# Patient Record
Sex: FEMALE | Race: WHITE | NOT HISPANIC OR LATINO | Employment: OTHER | URBAN - METROPOLITAN AREA
[De-identification: names, ages, dates, MRNs, and addresses within clinical notes are randomized per-mention and may not be internally consistent; named-entity substitution may affect disease eponyms.]

---

## 2017-01-30 DIAGNOSIS — E03.9 ACQUIRED HYPOTHYROIDISM: ICD-10-CM

## 2017-01-30 DIAGNOSIS — E03.9 ACQUIRED HYPOTHYROIDISM: Primary | ICD-10-CM

## 2017-01-30 LAB
T4 FREE SERPL-MCNC: 0.78 NG/DL (ref 0.76–1.46)
TSH SERPL DL<=0.005 MIU/L-ACNC: 5.58 MU/L (ref 0.4–4)

## 2017-01-30 PROCEDURE — 84443 ASSAY THYROID STIM HORMONE: CPT | Performed by: FAMILY MEDICINE

## 2017-01-30 PROCEDURE — 84439 ASSAY OF FREE THYROXINE: CPT | Performed by: FAMILY MEDICINE

## 2017-01-30 PROCEDURE — 36415 COLL VENOUS BLD VENIPUNCTURE: CPT | Performed by: FAMILY MEDICINE

## 2017-01-30 NOTE — TELEPHONE ENCOUNTER
Routing refill request to provider for review/approval because:  Labs out of range:  TSH.  Pt had lab drawn today but is still in process  Neel Zarate RN, BSN

## 2017-01-30 NOTE — TELEPHONE ENCOUNTER
Pending Prescriptions:                       Disp   Refills    levothyroxine (SYNTHROID/LEVOTHROID) 50 M*30 tab*1            Sig: Take 1 tablet (50 mcg) by mouth daily         Last Written Prescription Date: 11/29/2016  Last Quantity: 30, # refills: 1  Last Office Visit with [facility], [facility] or [facility] prescribing provider: 11/03/2016   Next 5 appointments (look out 90 days)     Jan 31, 2017 10:15 AM   Return Visit with Domonique Glynn NP   [facility] ([facility])    [address]  [address]  [facility] [address] [phone]            Feb 01, 2017 11:00 AM   Mishel Duggan with Maryana Brooks MD   [facility]    [address] [facility] [address] [phone]            Apr 11, 2017  3:00 PM   MyChart KENIA Short with Kavitha Spencer DO   [facility]    [address] [facility] [address] [phone]                   TSH   Date Value Ref Range Status   11/28/2016 4.06* 0.40 - 4.00 mU/L Final

## 2017-01-31 RX ORDER — LEVOTHYROXINE SODIUM 50 UG/1
50 TABLET ORAL DAILY
Qty: 30 TABLET | Refills: 1 | Status: SHIPPED | OUTPATIENT
Start: 2017-01-31 | End: 2017-03-08

## 2017-01-31 NOTE — TELEPHONE ENCOUNTER
Looks like her TSH is just a bit above range still, but her T4 has improved. I suggest staying at the same dose and rechecking thyroid levels in 1 month. If she is still high, we may need to make a dose adjustment. JH

## 2017-02-01 ENCOUNTER — OFFICE VISIT (OUTPATIENT)
Dept: FAMILY MEDICINE | Facility: CLINIC | Age: 52
End: 2017-02-01
Payer: COMMERCIAL

## 2017-02-01 VITALS
WEIGHT: 144 LBS | HEIGHT: 64 IN | BODY MASS INDEX: 24.59 KG/M2 | SYSTOLIC BLOOD PRESSURE: 110 MMHG | HEART RATE: 73 BPM | TEMPERATURE: 99.3 F | DIASTOLIC BLOOD PRESSURE: 78 MMHG

## 2017-02-01 DIAGNOSIS — E03.9 ACQUIRED HYPOTHYROIDISM: ICD-10-CM

## 2017-02-01 DIAGNOSIS — K21.9 GASTROESOPHAGEAL REFLUX DISEASE WITHOUT ESOPHAGITIS: Primary | ICD-10-CM

## 2017-02-01 DIAGNOSIS — Z98.84 GASTRIC BYPASS STATUS FOR OBESITY: ICD-10-CM

## 2017-02-01 DIAGNOSIS — R11.0 NAUSEA: ICD-10-CM

## 2017-02-01 DIAGNOSIS — G89.4 CHRONIC PAIN SYNDROME: ICD-10-CM

## 2017-02-01 DIAGNOSIS — F34.1 DYSTHYMIA: Primary | ICD-10-CM

## 2017-02-01 DIAGNOSIS — Z11.59 NEED FOR HEPATITIS C SCREENING TEST: ICD-10-CM

## 2017-02-01 PROCEDURE — 99213 OFFICE O/P EST LOW 20 MIN: CPT | Performed by: FAMILY MEDICINE

## 2017-02-01 RX ORDER — BUPRENORPHINE HYDROCHLORIDE, NALOXONE HYDROCHLORIDE 2; .5 MG/1; MG/1
FILM, SOLUBLE BUCCAL; SUBLINGUAL
COMMUNITY
Start: 2017-01-23 | End: 2017-02-01

## 2017-02-01 RX ORDER — CEFDINIR 300 MG/1
CAPSULE ORAL 2 TIMES DAILY PRN
COMMUNITY
Start: 2016-03-16 | End: 2017-02-01

## 2017-02-01 RX ORDER — LEVOTHYROXINE SODIUM 50 UG/1
50 TABLET ORAL DAILY
Qty: 30 TABLET | Refills: 1 | Status: CANCELLED | OUTPATIENT
Start: 2017-02-01

## 2017-02-01 ASSESSMENT — ANXIETY QUESTIONNAIRES
2. NOT BEING ABLE TO STOP OR CONTROL WORRYING: SEVERAL DAYS
IF YOU CHECKED OFF ANY PROBLEMS ON THIS QUESTIONNAIRE, HOW DIFFICULT HAVE THESE PROBLEMS MADE IT FOR YOU TO DO YOUR WORK, TAKE CARE OF THINGS AT HOME, OR GET ALONG WITH OTHER PEOPLE: SOMEWHAT DIFFICULT
3. WORRYING TOO MUCH ABOUT DIFFERENT THINGS: NOT AT ALL
7. FEELING AFRAID AS IF SOMETHING AWFUL MIGHT HAPPEN: NOT AT ALL
GAD7 TOTAL SCORE: 5
6. BECOMING EASILY ANNOYED OR IRRITABLE: SEVERAL DAYS
5. BEING SO RESTLESS THAT IT IS HARD TO SIT STILL: SEVERAL DAYS
1. FEELING NERVOUS, ANXIOUS, OR ON EDGE: SEVERAL DAYS

## 2017-02-01 ASSESSMENT — PATIENT HEALTH QUESTIONNAIRE - PHQ9: 5. POOR APPETITE OR OVEREATING: SEVERAL DAYS

## 2017-02-01 NOTE — PATIENT INSTRUCTIONS
Role of the Thyroid  The thyroid is an endocrine gland located in the neck, just below the voicebox. Endocrine glands make hormones. These are chemicals that carry messages through the bloodstream to other parts of the body. The thyroid gland makes thyroid hormones. The thyroid gland is regulated by the pituitary, a gland at the base of the brain.  Keeping the body working right    Thyroid hormones help keep all the cells in the body working right. It does this by controlling the metabolism. This is the rate at which every part of the body functions. The right amount of thyroid hormones keep the metabolism at a healthy pace. This helps the brain, heart, muscles, and other organs work well. A balanced metabolism also helps ensure a healthy temperature, heart rate, energy level, and growth rate.  The thyroid cycle  The thyroid hormone must be kept at a healthy level. A complex cycle maintains this level. The cycle starts with the pituitary. This gland monitors the level of thyroid hormone in the blood. Depending on the level, the pituitary sends TSH (thyroid stimulating hormone) through the bloodstream to the thyroid gland. TSH tells the thyroid how much thyroid hormone to make. In response to TSH, the thyroid makes thyroid hormone. Then thyroid hormone is sent into the bloodstream to the rest of the body. The pituitary senses the hormone level, adjusts the TSH level, and the cycle continues.    3212-6531 [employer]. [address] [place] [address], [place] [address]. All rights reserved. This information is not intended as a substitute for professional medical care. Always follow your healthcare professional's instructions.

## 2017-02-01 NOTE — NURSING NOTE
"Chief Complaint   Patient presents with     Recheck Medication       Initial /78 mmHg  Pulse 73  Temp(Src) 99.3  F (37.4  C) (Oral)  Ht 5' 4\" (1.626 m)  Wt 144 lb (65.318 kg)  BMI 24.71 kg/m2  LMP 12/19/2013  Breastfeeding? No Estimated body mass index is 24.71 kg/(m^2) as calculated from the following:    Height as of this encounter: 5' 4\" (1.626 m).    Weight as of this encounter: 144 lb (65.318 kg).  BP completed using cuff size: nikki James CMA          "

## 2017-02-01 NOTE — Clinical Note
My Depression Action Plan  Name: Lynn Thompson   Date of Birth 1965  Date: 2/1/2017    My doctor: Maryana Brooks   My clinic: [facility]  [address] [place] [address] [phone]          GREEN    ZONE   Good Control    What it looks like:     Things are going generally well. You have normal up s and down s. You may even feel depressed from time to time, but bad moods usually last less than a day.   What you need to do:  1. Continue to care for yourself (see self care plan)  2. Check your depression survival kit and update it as needed  3. Follow your physician s recommendations including any medication.  4. Do not stop taking medication unless you consult with your physician first.           YELLOW         ZONE Getting Worse    What it looks like:     Depression is starting to interfere with your life.     It may be hard to get out of bed; you may be starting to isolate yourself from others.    Symptoms of depression are starting to last most all day and this has happened for several days.     You may have suicidal thoughts but they are not constant.   What you need to do:     1. Call your care team, your response to treatment will improve if you keep your care team informed of your progress. Yellow periods are signs an adjustment may need to be made.     2. Continue your self-care, even if you have to fake it!    3. Talk to someone in your support network    4. Open up your depression survival kit           RED    ZONE Medical Alert - Get Help    What it looks like:     Depression is seriously interfering with your life.     You may experience these or other symptoms: You can t get out of bed most days, can t work or engage in other necessary activities, you have trouble taking care of basic hygiene, or basic responsibilities, thoughts of suicide or death that will not go away, self-injurious behavior.     What you need to do:  1. Call your care team  and request a same-day appointment. If they are not available (weekends or after hours) call your local crisis line, emergency room or 911.      Electronically signed by: Moises James, February 1, 2017    Depression Self Care Plan / Survival Kit    Self-Care for Depression  Here s the deal. Your body and mind are really not as separate as most people think.  What you do and think affects how you feel and how you feel influences what you do and think. This means if you do things that people who feel good do, it will help you feel better.  Sometimes this is all it takes.  There is also a place for medication and therapy depending on how severe your depression is, so be sure to consult with your medical provider and/ or Behavioral Health Consultant if your symptoms are worsening or not improving.     In order to better manage my stress, I will:    Exercise  Get some form of exercise, every day. This will help reduce pain and release endorphins, the  feel good  chemicals in your brain. This is almost as good as taking antidepressants!  This is not the same as joining a gym and then never going! (they count on that by the way ) It can be as simple as just going for a walk or doing some gardening, anything that will get you moving.      Hygiene   Maintain good hygiene (Get out of bed in the morning, Make your bed, Brush your teeth, Take a shower, and Get dressed like you were going to work, even if you are unemployed).  If your clothes don't fit try to get ones that do.    Diet  I will strive to eat foods that are good for me, drink plenty of water, and avoid excessive sugar, caffeine, alcohol, and other mood-altering substances.  Some foods that are helpful in depression are: complex carbohydrates, B vitamins, flaxseed, fish or fish oil, fresh fruits and vegetables.    Psychotherapy  I agree to participate in Individual Therapy (if recommended).    Medication  If prescribed medications, I agree to take them.  Missing  doses can result in serious side effects.  I understand that drinking alcohol, or other illicit drug use, may cause potential side effects.  I will not stop my medication abruptly without first discussing it with my provider.    Staying Connected With Others  I will stay in touch with my friends, family members, and my primary care provider/team.    Use your imagination  Be creative.  We all have a creative side; it doesn t matter if it s oil painting, sand castles, or mud pies! This will also kick up the endorphins.    Witness Beauty  (AKA stop and smell the roses) Take a look outside, even in mid-winter. Notice colors, textures. Watch the squirrels and birds.     Service to others  Be of service to others.  There is always someone else in need.  By helping others we can  get out of ourselves  and remember the really important things.  This also provides opportunities for practicing all the other parts of the program.    Humor  Laugh and be silly!  Adjust your TV habits for less news and crime-drama and more comedy.    Control your stress  Try breathing deep, massage therapy, biofeedback, and meditation. Find time to relax each day.     My support system    Clinic Contact:  Phone number:    Contact 1:  Phone number:    Contact 2:  Phone number:    Mandaen/:  Phone number:    Therapist:  Phone number:    Local crisis center:    Phone number:    Other community support:  Phone number:

## 2017-02-01 NOTE — MR AVS SNAPSHOT
After Visit Summary   2/1/2017    Lynn Thompson    MRN: [id number]           Patient Information     Date Of Birth          1965        Visit Information        Provider Department      2/1/2017 11:00 AM Maryana Brooks MD [facility]        Today's Diagnoses     Dysthymia    -  1     Acquired hypothyroidism         Need for hepatitis C screening test           Care Instructions      Role of the Thyroid  The thyroid is an endocrine gland located in the neck, just below the voicebox. Endocrine glands make hormones. These are chemicals that carry messages through the bloodstream to other parts of the body. The thyroid gland makes thyroid hormones. The thyroid gland is regulated by the pituitary, a gland at the base of the brain.  Keeping the body working right    Thyroid hormones help keep all the cells in the body working right. It does this by controlling the metabolism. This is the rate at which every part of the body functions. The right amount of thyroid hormones keep the metabolism at a healthy pace. This helps the brain, heart, muscles, and other organs work well. A balanced metabolism also helps ensure a healthy temperature, heart rate, energy level, and growth rate.  The thyroid cycle  The thyroid hormone must be kept at a healthy level. A complex cycle maintains this level. The cycle starts with the pituitary. This gland monitors the level of thyroid hormone in the blood. Depending on the level, the pituitary sends TSH (thyroid stimulating hormone) through the bloodstream to the thyroid gland. TSH tells the thyroid how much thyroid hormone to make. In response to TSH, the thyroid makes thyroid hormone. Then thyroid hormone is sent into the bloodstream to the rest of the body. The pituitary senses the hormone level, adjusts the TSH level, and the cycle continues.    9393-5130 [employer]. [address] [place] [address], [place] [address]. All rights reserved.  This information is not intended as a substitute for professional medical care. Always follow your healthcare professional's instructions.              Follow-ups after your visit        Your next 10 appointments already scheduled     Feb 06, 2017 12:45 PM   Return Visit with Domonique Glynn NP   [facility]    [address]  [address]  [facility] [address]   [phone]            Apr 11, 2017  3:00 PM   MyChart OB Short with Kavitha Spencer, DO   [facility] ([facility])    [address] [facility] [address] [phone]              Future tests that were ordered for you today     Open Future Orders        Priority Expected Expires Ordered    Hepatitis C Screen Reflex to HCV RNA Quant and Genotype Routine  2/1/2018 2/1/2017            Who to contact     If you have questions or need follow up information about today's clinic visit or your schedule please contact [facility] directly at [phone].  Normal or non-critical lab and imaging results will be communicated to you by MyChart, letter or phone within 4 business days after the clinic has received the results. If you do not hear from us within 7 days, please contact the clinic through SendTaskhart or phone. If you have a critical or abnormal lab result, we will notify you by phone as soon as possible.  Submit refill requests through [employer] or call your pharmacy and they will forward the refill request to us. Please allow 3 business days for your refill to be completed.          Additional Information About Your Visit        SendTaskhart Information     [employer] gives you secure access to your electronic health record. If you see a primary care provider, you can also send messages to your care team and make appointments. If you have questions, please call your primary care clinic.  If you do not have a primary care provider, please call  "[phone] and they will assist you.        Care EveryWhere ID     This is your Care EveryWhere ID. This could be used by other organizations to access your [facility] medical records  [id number]        Your Vitals Were     Pulse Temperature Height BMI (Body Mass Index) Last Period Breastfeeding?    73 99.3  F (37.4  C) (Oral) 5' 4\" (1.626 m) 24.71 kg/m2 12/19/2013 No       Blood Pressure from Last 3 Encounters:   02/01/17 110/78   12/19/16 110/60   12/14/16 102/68    Weight from Last 3 Encounters:   02/01/17 144 lb (65.318 kg)   12/19/16 146 lb (66.225 kg)   12/14/16 149 lb 3.2 oz (67.677 kg)              We Performed the Following     DEPRESSION ACTION PLAN (DAP) Order [[id number]]     DEPRESSION ACTION PLAN (DAP)          Today's Medication Changes          These changes are accurate as of: 2/1/17 11:49 AM.  If you have any questions, ask your nurse or doctor.               Stop taking these medicines if you haven't already. Please contact your care team if you have questions.     cefdinir 300 MG capsule   Commonly known as:  OMNICEF   Stopped by:  Maryana Brooks MD           fentaNYL 25 mcg/hr 72 hr patch   Commonly known as:  DURAGESIC   Stopped by:  Maryana Brooks MD           HYDROmorphone 2 MG tablet   Commonly known as:  DILAUDID   Stopped by:  Maryana Brooks MD           LORazepam 0.5 MG tablet   Commonly known as:  ATIVAN   Stopped by:  Maryana Brooks MD           SUBOXONE 2-0.5 MG per film   Generic drug:  buprenorphine HCl-naloxone HCl   Stopped by:  Maryana Brooks MD                    Primary Care Provider Office Phone # Fax #    Maryana Brooks [phone]       [facility] [address]  [facility] [address]        Thank you!     Thank you for choosing [facility]  for your care. Our goal is always to provide you with excellent care. Hearing back from our patients is one way we can continue to improve our " services. Please take a few minutes to complete the written survey that you may receive in the mail after your visit with us. Thank you!             Your Updated Medication List - Protect others around you: Learn how to safely use, store and throw away your medicines at www.disposemymeds.org.          This list is accurate as of: 2/1/17 11:49 AM.  Always use your most recent med list.                   Brand Name Dispense Instructions for use    amylase-lipase-protease 48087 UNITS Tabs tablet    VIOKACE    600 tablet    4-5 aps with meals and 1-2 with snacks       blood glucose lancing device     1 each    Use to test blood sugars 6 times daily or as directed.       blood glucose monitoring test strip    Untangle CONTOUR NEXT    2 Box    Check BS 4-6 times a day       BOOST HIGH PROTEIN Liqd      After above baseline labs are drawn, give: 6 mL/kg to maximum of 360 mL; the beverage is to be consumed within 5 minutes.       busPIRone 15 MG tablet    BUSPAR    120 tablet    Increased dose. 30 mg two times per day.       docusate 50 MG/5ML liquid    COLACE     Take 100 mg by mouth 2 times daily as needed for constipation       DULoxetine 30 MG EC capsule    CYMBALTA    60 capsule    Take 1 capsule (30 mg) by mouth 2 times daily Increased dose.       estradiol 0.1 MG/GM cream    ESTRACE VAGINAL    42.5 g    Place 2 g vaginally twice a week       estrogen (conjugated)-medroxyPROGESTERone 0.3-1.5 MG per tablet    PREMPRO    90 tablet    Take 1 tablet by mouth daily       gabapentin 100 MG capsule    NEURONTIN    180 capsule    Take 2 capsules (200 mg) by mouth 3 times daily       glucose 40 % Gel gel     1 Tube    Take 15-30 g by mouth every 15 minutes as needed.       insulin pen needle 32G X 4 MM    BD MAHESH U/F    100 each    Use up to 3 times daily as needed for insulin dosing       levothyroxine 50 MCG tablet    SYNTHROID/LEVOTHROID    30 tablet    Take 1 tablet (50 mcg) by mouth daily       loratadine 10 MG tablet     CLARITIN     Take 10 mg by mouth daily       modafinil 200 MG tablet    PROVIGIL     Take 2 tablets by mouth Once a Day       multivitamin CF formula Liqd liquid     75 mL    Take 2.5 mLs by mouth daily       NovoLOG penFILL 100 UNITS/ML injection   Generic drug:  insulin aspart     3 Month    Take as directed       ELKE CARBAJAL (HUNTER) Genny   Generic drug:  Injection Device for insulin      1 Device Inject 1 unit as needed for blood glucose above 140 mg/dL.       omeprazole 40 MG capsule    priLOSEC    180 capsule    Take 1 capsule (40 mg) by mouth 2 times daily Take 30-60 minutes before a meal.       polyethylene glycol Packet    MIRALAX/GLYCOLAX    7 packet    Take 17 g by mouth daily       sennosides 8.6 MG tablet    SENOKOT    120 tablet    Take 2 tablets by mouth 2 times daily       Sharps Container Misc     1 each    For insulin needles       traZODone 50 MG tablet    DESYREL    30 tablet    Take 1 tablet (50 mg) by mouth At Bedtime

## 2017-02-01 NOTE — TELEPHONE ENCOUNTER
omeprazole      Last Written Prescription Date: 3/1/16  Last Fill Quantity: 180,  # refills: 3   Last Office Visit with [facility], [facility] or [facility] prescribing provider: 6/7/16                                         Next 5 appointments (look out 90 days)     Feb 01, 2017 11:00 AM   SHORT [facility]ana Brooks MD   [facility]    [address] [facility] [address]   [phone]            Feb 06, 2017 12:45 PM   Return Visit with Domonique Glynn NP   [facility]    [address]  [address]  [facility] [address]   [phone]            Apr 11, 2017  3:00 PM   MyChart KENIA Barker with Kavitha Spencer DO   [facility]    [address] [facility] [address]   [phone]

## 2017-02-01 NOTE — PROGRESS NOTES
"  SUBJECTIVE:                                                    Lynn Thompson is a 52 year old female who presents to clinic today for the following health issues:    Medication Followup     Taking Medication as prescribed: yes    Side Effects:  None    Medication Helping Symptoms:  yes       Taking 50 mcg synthroid daily with no issues. Had thyroid levels tested recently, would like to review these.     Taking suboxone for chronic pain, physical addition to opiates, notes her anxiety is much improved since starting this. Not sure her buspar is helpful. Has noted an improvement since starting Cymbalta. Not using ativan at all since suboxone was started. Feels much better.       Problem list and histories reviewed & adjusted, as indicated.  Additional history: none    Problem list, Medication list, Allergies, and Medical/Social/Surgical histories reviewed in EPIC and updated as appropriate.    ROS:  Constitutional, HEENT, cardiovascular, pulmonary, gi and gu systems are negative, except as otherwise noted.    OBJECTIVE:                                                    /78 mmHg  Pulse 73  Temp(Src) 99.3  F (37.4  C) (Oral)  Ht 5' 4\" (1.626 m)  Wt 144 lb (65.318 kg)  BMI 24.71 kg/m2  LMP 12/19/2013  Breastfeeding? No  Body mass index is 24.71 kg/(m^2).  GENERAL: healthy, alert and no distress  NECK: no adenopathy, no asymmetry, masses, or scars and thyroid normal to palpation  RESP: lungs clear to auscultation - no rales, rhonchi or wheezes  CV: regular rate and rhythm, normal S1 S2, no S3 or S4, no murmur, click or rub, no peripheral edema and peripheral pulses strong    Diagnostic Test Results:  none     Results for orders placed or performed in visit on 01/30/17   TSH with free T4 reflex   Result Value Ref Range    TSH 5.58 (H) 0.40 - 4.00 mU/L   T4 free   Result Value Ref Range    T4 Free 0.78 0.76 - 1.46 ng/dL     *Note: Due to a large number of results and/or encounters for the requested time " period, some results have not been displayed. A complete set of results can be found in Results Review.          ASSESSMENT/PLAN:                                                      1. Dysthymia - improved  - DEPRESSION ACTION PLAN (DAP)    2. Acquired hypothyroidism - Discussed that T4 was now in range but TSH continues to be a bit high. Suggested continuing at same dose synthroid for next month and rechecking at that time.    3. Need for hepatitis C screening test  - Hepatitis C Screen Reflex to HCV RNA Quant and Genotype; Future    4. Chronic pain syndrome - on suboxone through pain clinic    Maryana Brooks MD  [facility]

## 2017-02-02 RX ORDER — OMEPRAZOLE 40 MG/1
40 CAPSULE, DELAYED RELEASE ORAL 2 TIMES DAILY
Qty: 180 CAPSULE | Refills: 3 | Status: SHIPPED | OUTPATIENT
Start: 2017-02-02 | End: 2018-02-10

## 2017-02-02 ASSESSMENT — PATIENT HEALTH QUESTIONNAIRE - PHQ9: SUM OF ALL RESPONSES TO PHQ QUESTIONS 1-9: 3

## 2017-02-02 ASSESSMENT — ANXIETY QUESTIONNAIRES: GAD7 TOTAL SCORE: 5

## 2017-02-02 NOTE — TELEPHONE ENCOUNTER
Pt is seen by Dr. Brooks.  Prescription approved per [facility] Refill Protocol.  Brittnee Robertson RN

## 2017-02-03 ENCOUNTER — TELEPHONE (OUTPATIENT)
Dept: FAMILY MEDICINE | Facility: CLINIC | Age: 52
End: 2017-02-03

## 2017-02-03 NOTE — TELEPHONE ENCOUNTER
PA submitted through cover [employer] [facility] for Omeprazole 40MG    [facility] at [phone].  Patient ID# [id number]    Edy DHILLON

## 2017-02-06 ENCOUNTER — OFFICE VISIT (OUTPATIENT)
Dept: PSYCHIATRY | Facility: CLINIC | Age: 52
End: 2017-02-06
Payer: COMMERCIAL

## 2017-02-06 VITALS
BODY MASS INDEX: 24.75 KG/M2 | DIASTOLIC BLOOD PRESSURE: 66 MMHG | TEMPERATURE: 98.5 F | RESPIRATION RATE: 14 BRPM | HEART RATE: 77 BPM | OXYGEN SATURATION: 97 % | HEIGHT: 64 IN | WEIGHT: 145 LBS | SYSTOLIC BLOOD PRESSURE: 118 MMHG

## 2017-02-06 DIAGNOSIS — G47.00 PERSISTENT INSOMNIA: ICD-10-CM

## 2017-02-06 DIAGNOSIS — F41.9 ANXIETY: Primary | ICD-10-CM

## 2017-02-06 PROCEDURE — 99214 OFFICE O/P EST MOD 30 MIN: CPT | Performed by: NURSE PRACTITIONER

## 2017-02-06 RX ORDER — DULOXETIN HYDROCHLORIDE 30 MG/1
30 CAPSULE, DELAYED RELEASE ORAL 2 TIMES DAILY
Qty: 180 CAPSULE | Refills: 1 | Status: SHIPPED | OUTPATIENT
Start: 2017-02-06 | End: 2017-05-25

## 2017-02-06 RX ORDER — TRAZODONE HYDROCHLORIDE 50 MG/1
50 TABLET, FILM COATED ORAL AT BEDTIME
Qty: 90 TABLET | Refills: 1 | Status: SHIPPED | OUTPATIENT
Start: 2017-02-06 | End: 2017-05-25

## 2017-02-06 RX ORDER — BUSPIRONE HYDROCHLORIDE 15 MG/1
TABLET ORAL
Qty: 120 TABLET | Refills: 3 | Status: SHIPPED | OUTPATIENT
Start: 2017-02-06 | End: 2017-07-06

## 2017-02-06 ASSESSMENT — ANXIETY QUESTIONNAIRES
5. BEING SO RESTLESS THAT IT IS HARD TO SIT STILL: NOT AT ALL
2. NOT BEING ABLE TO STOP OR CONTROL WORRYING: SEVERAL DAYS
1. FEELING NERVOUS, ANXIOUS, OR ON EDGE: SEVERAL DAYS
3. WORRYING TOO MUCH ABOUT DIFFERENT THINGS: SEVERAL DAYS
7. FEELING AFRAID AS IF SOMETHING AWFUL MIGHT HAPPEN: SEVERAL DAYS
GAD7 TOTAL SCORE: 6
6. BECOMING EASILY ANNOYED OR IRRITABLE: SEVERAL DAYS

## 2017-02-06 ASSESSMENT — PATIENT HEALTH QUESTIONNAIRE - PHQ9: 5. POOR APPETITE OR OVEREATING: SEVERAL DAYS

## 2017-02-06 NOTE — MR AVS SNAPSHOT
After Visit Summary   2/6/2017    Lynn Thompson    MRN: [id number]           Patient Information     Date Of Birth          1965        Visit Information        Provider Department      2/6/2017 12:45 PM Domonique Glynn NP [facility]        Today's Diagnoses     Anxiety    -  1     Persistent insomnia           Care Instructions    Treatment Plan:    Continue Buspar (buspirone) 30 mg by mouth in AM and 30 mg in PM.    Continue Cymbalta (duloxetine) to 30 mg in AM and 30 mg in PM.     Continue trazodone 50 mg by mouth at bedtime for sleep.    Continue Neurontin (gabapentin) 200 mg 3 times per day per pain specialist.    Continue Provigil (modafinil) 400 mg in AM per sleep specialist.    Continue Ativan (lorazepam) as needed for panic per primary care provider.     Continue all other medications as reviewed per electronic medical record today.     All questions addressed. Education and counseling completed regarding risks and benefits of medications and psychotherapy options.    Safety plan was reviewed. To the Emergency Department as needed or call after hours crisis line at [phone] or [phone].     Continue individual/group therapy as planned.    Schedule an appointment with me in 3 months or sooner as needed. Call [facility] at [phone] to schedule.    Follow up with primary care provider as planned or for acute medical concerns.    Call the psychiatric nurse line with medication questions or concerns at [phone].    My Practice Policy was reviewed and signed: YES     MyChart may be used to communicate with your provider, but this is not intended to be used for emergencies.        Follow-ups after your visit        Your next 10 appointments already scheduled     Apr 11, 2017  3:00 PM   Mishel OB Short with aKvitha Spencer,    [facility] ([facility])    [address] [facility] [address]  "  [phone]              Who to contact     If you have questions or need follow up information about today's clinic visit or your schedule please contact [facility] directly at [phone].  Normal or non-critical lab and imaging results will be communicated to you by MyChart, letter or phone within 4 business days after the clinic has received the results. If you do not hear from us within 7 days, please contact the clinic through MyChart or phone. If you have a critical or abnormal lab result, we will notify you by phone as soon as possible.  Submit refill requests through Hubskip or call your pharmacy and they will forward the refill request to us. Please allow 3 business days for your refill to be completed.          Additional Information About Your Visit        [employer] Information     Hubskip gives you secure access to your electronic health record. If you see a primary care provider, you can also send messages to your care team and make appointments. If you have questions, please call your primary care clinic.  If you do not have a primary care provider, please call [phone] and they will assist you.        Care EveryWhere ID     This is your Care EveryWhere ID. This could be used by other organizations to access your [facility] medical records  [id number]        Your Vitals Were     Pulse Temperature Respirations    77 98.5  F (36.9  C) (Oral) 14    Height BMI (Body Mass Index) Pulse Oximetry    5' 4\" (1.626 m) 24.88 kg/m2 97%    Last Period Breastfeeding?       12/19/2013 No        Blood Pressure from Last 3 Encounters:   02/06/17 118/66   02/01/17 110/78   12/19/16 110/60    Weight from Last 3 Encounters:   02/06/17 145 lb (65.772 kg)   02/01/17 144 lb (65.318 kg)   12/19/16 146 lb (66.225 kg)              Today, you had the following     No orders found for display         Where to get your medicines      These medications were sent to [id number] [place], [place] - [address] [address]  [address], [facility] [address]     Phone:  [phone]    - busPIRone 15 MG tablet  - DULoxetine 30 MG EC capsule  - traZODone 50 MG tablet       Primary Care Provider Office Phone # Fax #    Maryana Sivakumar Brooks [phone]       [facility] [address]  [facility] [address]        Thank you!     Thank you for choosing [facility]  for your care. Our goal is always to provide you with excellent care. Hearing back from our patients is one way we can continue to improve our services. Please take a few minutes to complete the written survey that you may receive in the mail after your visit with us. Thank you!             Your Updated Medication List - Protect others around you: Learn how to safely use, store and throw away your medicines at www.disposemymeds.org.          This list is accurate as of: 2/6/17  1:16 PM.  Always use your most recent med list.                   Brand Name Dispense Instructions for use    amylase-lipase-protease 43295 UNITS Tabs tablet    VIOKACE    600 tablet    4-5 aps with meals and 1-2 with snacks       blood glucose lancing device     1 each    Use to test blood sugars 6 times daily or as directed.       blood glucose monitoring test strip    BILL CONTOUR NEXT    2 Box    Check BS 4-6 times a day       BOOST HIGH PROTEIN Liqd      After above baseline labs are drawn, give: 6 mL/kg to maximum of 360 mL; the beverage is to be consumed within 5 minutes.       busPIRone 15 MG tablet    BUSPAR    120 tablet    Increased dose. 30 mg two times per day.       docusate 50 MG/5ML liquid    COLACE     Take 100 mg by mouth 2 times daily as needed for constipation       DULoxetine 30 MG EC capsule    CYMBALTA    180 capsule    Take 1 capsule (30 mg) by mouth 2 times daily Increased dose.       estradiol 0.1 MG/GM cream    ESTRACE VAGINAL    42.5 g    Place 2 g vaginally twice a week       estrogen  (conjugated)-medroxyPROGESTERone 0.3-1.5 MG per tablet    PREMPRO    90 tablet    Take 1 tablet by mouth daily       gabapentin 100 MG capsule    NEURONTIN    180 capsule    Take 2 capsules (200 mg) by mouth 3 times daily       glucose 40 % Gel gel     1 Tube    Take 15-30 g by mouth every 15 minutes as needed.       insulin pen needle 32G X 4 MM    BD MAHESH U/F    100 each    Use up to 3 times daily as needed for insulin dosing       levothyroxine 50 MCG tablet    SYNTHROID/LEVOTHROID    30 tablet    Take 1 tablet (50 mcg) by mouth daily       loratadine 10 MG tablet    CLARITIN     Take 10 mg by mouth daily       modafinil 200 MG tablet    PROVIGIL     Take 2 tablets by mouth Once a Day       multivitamin CF formula Liqd liquid     75 mL    Take 2.5 mLs by mouth daily       NovoLOG penFILL 100 UNITS/ML injection   Generic drug:  insulin aspart     3 Month    Take as directed       ELKE FRIEND) Genny   Generic drug:  Injection Device for insulin      1 Device Inject 1 unit as needed for blood glucose above 140 mg/dL.       omeprazole 40 MG capsule    priLOSEC    180 capsule    Take 1 capsule (40 mg) by mouth 2 times daily Take 30-60 minutes before a meal.       polyethylene glycol Packet    MIRALAX/GLYCOLAX    7 packet    Take 17 g by mouth daily       sennosides 8.6 MG tablet    SENOKOT    120 tablet    Take 2 tablets by mouth 2 times daily       Sharps Container Misc     1 each    For insulin needles       traZODone 50 MG tablet    DESYREL    90 tablet    Take 1 tablet (50 mg) by mouth At Bedtime

## 2017-02-06 NOTE — PROGRESS NOTES
"  SUBJECTIVE:                                                    Lynn Thompson is a 52 year old female who presents to clinic today for the following health issues:      ***    {additional problems for provider to add:466173}    Problem list and histories reviewed & adjusted, as indicated.  Additional history: {NONE - AS DOCUMENTED:103688::\"as documented\"}    {HIST REVIEW/ LINKS 2:832514}    {PROVIDER CHARTING PREFERENCE:429603}    "

## 2017-02-06 NOTE — NURSING NOTE
"Chief Complaint   Patient presents with     Consult       Initial /66 mmHg  Pulse 77  Temp(Src) 98.5  F (36.9  C) (Oral)  Resp 14  Ht 5' 4\" (1.626 m)  Wt 145 lb (65.772 kg)  BMI 24.88 kg/m2  SpO2 97%  LMP 12/19/2013  Breastfeeding? No Estimated body mass index is 24.88 kg/(m^2) as calculated from the following:    Height as of this encounter: 5' 4\" (1.626 m).    Weight as of this encounter: 145 lb (65.772 kg).  Medication Reconciliation: complete   Jenae OH      "

## 2017-02-06 NOTE — PROGRESS NOTES
"    Outpatient Psychiatric Progress Note    Name: Lynn Thompson   : 1965  Date: 2017                         Primary Care Provider: Maryana Brooks MD - last visit 2017  Therapist: Dr Rina Watt    PHQ-9 scores:  PHQ-9 SCORE 2016   Total Score 17 3 5       TAMIE-7 scores:  TAMIE-7 SCORE 2016   Total Score 18 5 6       Patient Identification:  Patient is a 52 year old year old,   White American female  who presents for return visit with me.  Patient is currently on medical leave from Full Time Job . Patient attended the session alone. Patient prefers to be called: \"Lynn\"    Interim History:    I last saw Lynn Thompson for outpatient psychiatry Return Visit on 2016.     During that appointment, we Continued Buspar (buspirone) 30 mg by mouth in AM and 30 mg in PM.    Continue Cymbalta (duloxetine) to 30 mg in AM and 30 mg in PM. Consider increase up to maximum dose of 120 mg by mouth daily for mood, anxiety, and chronic pain.    Continue trazodone 50 mg by mouth at bedtime for sleep. Dose range up to 150 mg by mouth at bedtime is an option if needed.    Continue Neurontin (gabapentin) per pain specialist.    Continue Provigil (modafinil) 400 mg in AM per sleep specialist.    Continue Ativan (lorazepam) 0.5 mg up to 3 times per day for panic per primary care provider.     Current medications include:   Current Outpatient Prescriptions   Medication Sig     omeprazole (PRILOSEC) 40 MG capsule Take 1 capsule (40 mg) by mouth 2 times daily Take 30-60 minutes before a meal.     levothyroxine (SYNTHROID/LEVOTHROID) 50 MCG tablet Take 1 tablet (50 mcg) by mouth daily     busPIRone (BUSPAR) 15 MG tablet Increased dose. 30 mg two times per day.     traZODone (DESYREL) 50 MG tablet Take 1 tablet (50 mg) by mouth At Bedtime     DULoxetine (CYMBALTA) 30 MG EC capsule Take 1 capsule (30 mg) by mouth 2 times daily Increased dose.     " amylase-lipase-protease (VIOKACE) 07705 UNITS TABS tablet 4-5 aps with meals and 1-2 with snacks     blood glucose (BILL MICROLET 2) lancing device Use to test blood sugars 6 times daily or as directed.     estradiol (ESTRACE VAGINAL) 0.1 MG/GM vaginal cream Place 2 g vaginally twice a week     estrogen, conjugated,-medroxyPROGESTERone (PREMPRO) 0.3-1.5 MG per tablet Take 1 tablet by mouth daily     Nutritional Supplements (BOOST HIGH PROTEIN) LIQD After above baseline labs are drawn, give: 6 mL/kg to maximum of 360 mL; the beverage is to be consumed within 5 minutes.     sennosides (SENOKOT) 8.6 MG tablet Take 2 tablets by mouth 2 times daily     docusate (COLACE) 50 MG/5ML liquid Take 100 mg by mouth 2 times daily as needed for constipation     Injection Device for insulin (NOVOPEN JR, HUNTER,) MODESTA 1 Device Inject 1 unit as needed for blood glucose above 140 mg/dL.     loratadine (CLARITIN) 10 MG tablet Take 10 mg by mouth daily     insulin pen needle (BD MAHESH U/F) 32G X 4 MM Use up to 3 times daily as needed for insulin dosing     blood glucose monitoring (BILL CONTOUR NEXT) test strip Check BS 4-6 times a day     gabapentin (NEURONTIN) 100 MG capsule Take 2 capsules (200 mg) by mouth 3 times daily     polyethylene glycol (MIRALAX/GLYCOLAX) packet Take 17 g by mouth daily     multivitamin CF formula (AQUADEKS) LIQD liquid Take 2.5 mLs by mouth daily     modafinil (PROVIGIL) 200 MG tablet Take 2 tablets by mouth Once a Day     NOVOLOG PENFILL SOLN 100 UNIT/ML Take as directed     glucose 40 % GEL Take 15-30 g by mouth every 15 minutes as needed.     Sharps Container MISC For insulin needles     No current facility-administered medications for this visit.       The [place] Prescription Monitoring Program has been reviewed and there are no concerns about diversionary activity for controlled substances at this time.    Started Suboxone on January 17th.  Takes two times per day.   Lynn Thompson reports mood  "has been: \"pretty good\" she notes that she was feeling more \"down and blue\" but is able to identify that she is anticipating the anniversary of her grandmother's death, the sudden loss of two close friends. She also realized that she was only taking 30 mg of the Cymbalta (duloxetine).   Anxiety has been: \"no big worries\"  Sleep has been: \"hard to fall asleep\" usually when she falls asleep before taking her Desyrel/Olepto (trazodone).   PHQ9 and GAD7 scores were reviewed today.   Medication side effects: Denies  Current stressors include: Occupational Difficulties- Has been in contact with her employer.   Coping mechanisms and supports include: Exercise, Deep Breathing, Therapy, Family, Hobbies and Friends    Past Medical History   Diagnosis Date     Regional enteritis of unspecified site 1987     inacive/remission     Depressive disorder, not elsewhere classified      also occ panic spells     Obesity, unspecified      better after gastric bypass 4/03     Headache(784.0)      still periodic HA's ;  often 5X/week     Dyspepsia and other specified disorders of function of stomach 6/99     H. pylori   treated     Benign paroxysmal positional vertigo      occ.      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  Has been tested for oxalate.     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     Chronic abdominal pain 7/17/2013     Chronic pancreatitis (H) 7/17/2013     Ureteral " calculus 10/2/2012     Spasm of sphincter of Oddi      surgical + endoscopic stenting of pancreatic duct @ [facility] [place] 5/23/06     Other chronic pain      Diabetes (H)      post pancreatectomy     Hypertension 2/22/16     Stress related     Headache 12/28/2004     Headache 12/28/2004     Depressive disorder      Headache 12/28/2004     Thyroid nodule 11/1/2016      has a past medical history of Regional enteritis of unspecified site (1987); Depressive disorder, not elsewhere classified; Obesity, unspecified; Headache(784.0); Dyspepsia and other specified disorders of function of stomach (6/99); Benign paroxysmal positional vertigo; Spasm of sphincter of Oddi (9/02); Iron deficiency anemia secondary to inadequate dietary iron intake (11/03); Pyelonephritis, unspecified (5/04, 10/8); Calculus of kidney (5/05); Abdominal pain, epigastric (11/05, ongoing; goes to pain clinic); Vaccination not carried out; Sleep apnea; Chronic pain; Pancreatic disease; Post-pancreatectomy diabetes (H) (5/17/2013); Chronic abdominal pain (7/17/2013); Chronic pancreatitis (H) (7/17/2013); Ureteral calculus (10/2/2012); Spasm of sphincter of Oddi; Other chronic pain; Diabetes (H); Hypertension (2/22/16); Headache (12/28/2004); Headache (12/28/2004); Depressive disorder; Headache (12/28/2004); and Thyroid nodule (11/1/2016). She also has no past medical history of Malignant hyperthermia, Chronic infection, Heart disease, Uncomplicated asthma, Cerebral infarction (H), Congestive heart failure, unspecified, History of blood transfusion, Arthritis, or COPD (chronic obstructive pulmonary disease) (H).    Social History:  Current Living situation:  [place], [place] with Spouse/Partner and daughter and pets. Feels safe at home.  Employment: [employer] is still pending and if approved, will go until February 2, 2017. She has been trying to contact her workplace about other part time options. Her position has been filled. She needs a release to return back to work  "before applying for other options through her workplace.   Current use of drugs or alcohol: Denies    Tobacco use: No  Recovery Programming Involvement: None    Vital Signs:   /66 mmHg  Pulse 77  Temp(Src) 98.5  F (36.9  C) (Oral)  Resp 14  Ht 5' 4\" (1.626 m)  Wt 145 lb (65.772 kg)  BMI 24.88 kg/m2  SpO2 97%  LMP 12/19/2013  Breastfeeding? No    Labs:  Most recent laboratory results reviewed and the pertinent results include:   Orders Only on 01/30/2017   Component Date Value Ref Range Status     TSH 01/30/2017 5.58* 0.40 - 4.00 mU/L Final     T4 Free 01/30/2017 0.78  0.76 - 1.46 ng/dL Final      Review of Systems:  10 systems (general, cardiovascular, respiratory, eyes, ENT, endocrine, GI, , M/S, neurological) were reviewed. Most pertinent finding(s) is/are: chronic pain, ongoing bowel changes due to medication changes. The remaining systems are all unremarkable.    Mental Status Examination:  Appearance:  awake, alert, appeared as age stated, adequate grooming, alert, cooperative and no distress  Attitude:  cooperative  Eye Contact:  good, wears glasses  Gait and Station: Normal  Mood:  \"better\"  Affect:  mood congruent, tearful at times  Speech:  clear, coherent  Psychomotor Behavior:  no evidence of tardive dyskinesia, dystonia, or tics  Thought Process:  logical, linear and goal oriented  Associations:  no loose associations  Thought Content:  no evidence of suicidal ideation or homicidal ideation, no evidence of psychotic thought, no auditory hallucinations present and no visual hallucinations present  Insight:  good  Judgment:  intact  Oriented to:  time, person, and place  Attention Span and Concentration:  fair  Recent and Remote Memory:  intact Not formally assessed. no amnesia  Fund of Knowledge: appropriate    Suicide Risk Assessment:  Today Lynn Thompson reports ongoing stress related to psychosocial stressors, but has identified more coping skills. In addition, there are notable " risk factors for self-harm, including age and anxiety. However, risk is mitigated by family, sobriety, absence of past attempts, ability to volunteer a safety plan, history of seeking help when needed, future oriented, no access to firearms or weapons, denies suicidal intent or plan, no family history of suicide and denies homicidal ideation, intent, or plan. Therefore, based on all available evidence including the factors cited above, Lynn Thompson does not appear to be at imminent risk for self-harm, does not meet criteria for a 72-hr hold, and therefore remains appropriate for ongoing outpatient level of care.  A thorough assessment of risk factors related to suicide and self-harm have been reviewed and are noted above. The patient convincingly denies suicidality on several occasions. There was no deceit detected, and the patient presented in a manner that was believable.     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)    Medical comorbidities include:   Patient Active Problem List    Diagnosis Date Noted     Health Care Home 05/03/2011     Priority: High     EMERGENCY CARE PLAN  Presenting Problem Signs and Symptoms Treatment Plan    Questions or conerns during clinic hours    I will call the clinic directly     Questions or conerns outside clinic hours    I will call the 24 hour nurse line at [phone]    Patient needs to schedule an appointment    I will call the 24 hour scheduling team at [phone] or clinic directly    Same day treatment     I will call the clinic first, nurse line if after hours, urgent care and express care if needed       DX V65.8 REPLACED WITH [address] [facility] (04/08/2013)       Decreased libido 12/14/2016     Priority: Medium     Acquired hypothyroidism 11/03/2016      Priority: Medium     Thyroid nodule 11/01/2016     Priority: Medium     Dysthymia 03/01/2016     Priority: Medium     Anxiety 03/01/2016     Priority: Medium     S/P exploratory laparotomy 12/29/2015     Priority: Medium     BLU (obstructive sleep apnea) 12/23/2015     Priority: Medium     Asplenia 10/24/2014     Priority: Medium     Acquired absence of pancreas 05/17/2013     Priority: Medium     Exocrine pancreatic insufficiency 05/17/2013     Priority: Medium     Chronic pain syndrome 02/23/2013     Priority: Medium     Gastric bypass status for obesity 10/26/2010     Priority: Medium     Iron deficiency anemia secondary to inadequate dietary iron intake 12/28/2004     Priority: Medium     relates to gastric bypass         Psychosocial & Contextual Factors:  Occupational Difficulties, Parent/Child Relationship Difficulties, Financial Difficulties, Relationship Difficulties, Phase of Life Difficulties and Medical Comorbidites    Assessment:  Lynn MILADY Hallon reports improvement in anxiety and mood since starting Suboxone. Medication side effects and alternatives were reviewed. Consider dose increase of Cymbalta (duloxetine) up to maximum dose of 120 mg by mouth daily for mood, anxiety, and chronic pain. May increase Desyrel/Olepto (trazodone) up to 150 mg by mouth at bedtime is an option if needed. Discussed possible return to work options such as part time or working from home. Uses reminders for her prescription medications. Has not needed to take the Ativan (lorazepam) as often as before and is still able to function and identify her anxiety triggers. Feels able to think more clearly. Is interested in more information about Suboxone relevant to chronic pain medication uses. Continue multiple day dosing of medications due to gastric bypass surgery history.     Treatment Plan:    Continue Buspar (buspirone) 30 mg by mouth in AM and 30 mg in PM.    Continue Cymbalta (duloxetine) to 30 mg in AM and 30 mg in PM.      Continue trazodone 50 mg by mouth at bedtime for sleep.    Continue Neurontin (gabapentin) 200 mg 3 times per day per pain specialist.    Continue Provigil (modafinil) 400 mg in AM per sleep specialist.    Continue Ativan (lorazepam) as needed for panic per primary care provider.     Continue all other medications as reviewed per electronic medical record today.     All questions addressed. Education and counseling completed regarding risks and benefits of medications and psychotherapy options.    Safety plan was reviewed. To the Emergency Department as needed or call after hours crisis line at [phone] or [phone].     Continue individual/group therapy as planned.    Schedule an appointment with me in 3 months or sooner as needed. Call [facility] at [phone] to schedule.    Follow up with primary care provider as planned or for acute medical concerns.    Call the psychiatric nurse line with medication questions or concerns at [phone].    My Practice Policy was reviewed and signed: YES     MyChart may be used to communicate with your provider, but this is not intended to be used for emergencies.    Administrative Billing:   Time spent with patient was 30 minutes and greater than 50% of time or 20 minutes was spent in counseling and coordination of care.    Signed:   Domonique Glynn, PhD, APRN, CNP   Psychiatry

## 2017-02-06 NOTE — PATIENT INSTRUCTIONS
Treatment Plan:    Continue Buspar (buspirone) 30 mg by mouth in AM and 30 mg in PM.    Continue Cymbalta (duloxetine) to 30 mg in AM and 30 mg in PM.     Continue trazodone 50 mg by mouth at bedtime for sleep.    Continue Neurontin (gabapentin) 200 mg 3 times per day per pain specialist.    Continue Provigil (modafinil) 400 mg in AM per sleep specialist.    Continue Ativan (lorazepam) as needed for panic per primary care provider.     Continue all other medications as reviewed per electronic medical record today.     All questions addressed. Education and counseling completed regarding risks and benefits of medications and psychotherapy options.    Safety plan was reviewed. To the Emergency Department as needed or call after hours crisis line at [phone] or [phone].     Continue individual/group therapy as planned.    Schedule an appointment with me in 3 months or sooner as needed. Call [facility] at [phone] to schedule.    Follow up with primary care provider as planned or for acute medical concerns.    Call the psychiatric nurse line with medication questions or concerns at [phone].    My Practice Policy was reviewed and signed: YES     MyChart may be used to communicate with your provider, but this is not intended to be used for emergencies.

## 2017-02-07 ASSESSMENT — PATIENT HEALTH QUESTIONNAIRE - PHQ9: SUM OF ALL RESPONSES TO PHQ QUESTIONS 1-9: 5

## 2017-02-07 ASSESSMENT — ANXIETY QUESTIONNAIRES: GAD7 TOTAL SCORE: 6

## 2017-02-09 ENCOUNTER — TELEPHONE (OUTPATIENT)
Dept: TRANSPLANT | Facility: CLINIC | Age: 52
End: 2017-02-09

## 2017-02-13 ENCOUNTER — TRANSFERRED RECORDS (OUTPATIENT)
Dept: HEALTH INFORMATION MANAGEMENT | Facility: CLINIC | Age: 52
End: 2017-02-13

## 2017-02-20 ENCOUNTER — TELEPHONE (OUTPATIENT)
Dept: EDUCATION SERVICES | Facility: CLINIC | Age: 52
End: 2017-02-20

## 2017-02-20 DIAGNOSIS — E13.9 POST-PANCREATECTOMY DIABETES (H): Primary | ICD-10-CM

## 2017-02-20 DIAGNOSIS — E89.1 POST-PANCREATECTOMY DIABETES (H): Primary | ICD-10-CM

## 2017-02-20 DIAGNOSIS — Z90.410 POST-PANCREATECTOMY DIABETES (H): Primary | ICD-10-CM

## 2017-02-20 NOTE — TELEPHONE ENCOUNTER
Patient has expressed interest in attending diabetes education, as a part of her [employer] insurance plan's Comprehensive Diabetes Solution program. Diabetes Educator Referral has been pended for your completion and signature. Please review and sign, if you are in agreement. Thank you!    Temi Figueredo MPH RD LD CDE   [place] [facility]

## 2017-02-21 PROBLEM — Z90.410 POST-PANCREATECTOMY DIABETES (H): Status: ACTIVE | Noted: 2017-02-21

## 2017-02-21 PROBLEM — E13.9 POST-PANCREATECTOMY DIABETES (H): Status: ACTIVE | Noted: 2017-02-21

## 2017-02-21 PROBLEM — E89.1 POST-PANCREATECTOMY DIABETES (H): Status: ACTIVE | Noted: 2017-02-21

## 2017-02-28 ENCOUNTER — DOCUMENTATION ONLY (OUTPATIENT)
Dept: BEHAVIORAL HEALTH | Facility: CLINIC | Age: 52
End: 2017-02-28

## 2017-02-28 NOTE — PROGRESS NOTES
[employer] form faxed to clinic for Domonique Glynn to fill out. Form filled and faxed back.  Abby Borges MA

## 2017-03-01 ENCOUNTER — ALLIED HEALTH/NURSE VISIT (OUTPATIENT)
Dept: EDUCATION SERVICES | Facility: CLINIC | Age: 52
End: 2017-03-01
Payer: COMMERCIAL

## 2017-03-01 VITALS — BODY MASS INDEX: 24.96 KG/M2 | WEIGHT: 145.4 LBS

## 2017-03-01 DIAGNOSIS — E13.9 POST-PANCREATECTOMY DIABETES (H): Primary | ICD-10-CM

## 2017-03-01 DIAGNOSIS — Z90.410 POST-PANCREATECTOMY DIABETES (H): Primary | ICD-10-CM

## 2017-03-01 DIAGNOSIS — E89.1 POST-PANCREATECTOMY DIABETES (H): Primary | ICD-10-CM

## 2017-03-01 PROCEDURE — G0108 DIAB MANAGE TRN  PER INDIV: HCPCS

## 2017-03-01 NOTE — MR AVS SNAPSHOT
After Visit Summary   3/1/2017    Lynn Thompson    MRN: [id number]           Patient Information     Date Of Birth          1965        Visit Information        Provider Department      3/1/2017 12:30 PM [place] DIABETIC ED [facility]        Today's Diagnoses     Post-pancreatectomy diabetes (H)    -  1       Follow-ups after your visit        Your next 10 appointments already scheduled     Mar 06, 2017  8:15 AM CST   Lab visit with LV LAB   [facility])    [address] [address] [phone]           Please do not eat 10-12 hours before your appointment if you are coming in fasting for labs on lipids, cholesterol, or glucose (sugar). Does not apply to pregnant women.  Water with medications is okay. Do not drink coffee or other fluids.  If you have concerns about taking  your medications, please send a message by clicking on Secure Messaging, Message Your Care Team.            Apr 11, 2017  3:00 PM CDT   MyChart OB Short with Kavitha Spencer, DO   [facility])    [address] [address] [phone]              Who to contact     If you have questions or need follow up information about today's clinic visit or your schedule please contact [facility] directly at [phone].  Normal or non-critical lab and imaging results will be communicated to you by MyChart, letter or phone within 4 business days after the clinic has received the results. If you do not hear from us within 7 days, please contact the clinic through MyChart or phone. If you have a critical or abnormal lab result, we will notify you by phone as soon as possible.  Submit refill requests through [employer] or call your pharmacy and they will forward the refill request to us. Please allow 3 business days for your refill to be completed.          Additional  Information About Your Visit        [employer] Information     [employer] gives you secure access to your electronic health record. If you see a primary care provider, you can also send messages to your care team and make appointments. If you have questions, please call your primary care clinic.  If you do not have a primary care provider, please call [phone] and they will assist you.        Care EveryWhere ID     This is your Care EveryWhere ID. This could be used by other organizations to access your [place] medical records  [id number]        Your Vitals Were     Last Period BMI (Body Mass Index)                12/19/2013 24.96 kg/m2           Blood Pressure from Last 3 Encounters:   02/06/17 118/66   02/01/17 110/78   12/19/16 110/60    Weight from Last 3 Encounters:   03/01/17 66 kg (145 lb 6.4 oz)   02/06/17 65.8 kg (145 lb)   02/01/17 65.3 kg (144 lb)              We Performed the Following     DIABETES EDUCATION - Individual  []        Primary Care Provider Office Phone # Fax #    Maryana Brooks [phone]       [facility] [address] [address] [facility] [address]        Thank you!     Thank you for choosing [facility]  for your care. Our goal is always to provide you with excellent care. Hearing back from our patients is one way we can continue to improve our services. Please take a few minutes to complete the written survey that you may receive in the mail after your visit with us. Thank you!             Your Updated Medication List - Protect others around you: Learn how to safely use, store and throw away your medicines at www.disposemymeds.org.          This list is accurate as of: 3/1/17  2:09 PM.  Always use your most recent med list.                   Brand Name Dispense Instructions for use    amylase-lipase-protease 89412 UNITS Tabs tablet    VIOKACE    600 tablet    4-5 aps with meals and 1-2 with snacks       blood glucose lancing device      1 each    Use to test blood sugars 6 times daily or as directed.       blood glucose monitoring test strip    BILL CONTOUR NEXT    2 Box    Check BS 4-6 times a day       BOOST HIGH PROTEIN Liqd      After above baseline labs are drawn, give: 6 mL/kg to maximum of 360 mL; the beverage is to be consumed within 5 minutes.       busPIRone 15 MG tablet    BUSPAR    120 tablet    Increased dose. 30 mg two times per day.       docusate 50 MG/5ML liquid    COLACE     Take 100 mg by mouth 2 times daily as needed for constipation       DULoxetine 30 MG EC capsule    CYMBALTA    180 capsule    Take 1 capsule (30 mg) by mouth 2 times daily Increased dose.       estradiol 0.1 MG/GM cream    ESTRACE VAGINAL    42.5 g    Place 2 g vaginally twice a week       estrogen (conjugated)-medroxyPROGESTERone 0.3-1.5 MG per tablet    PREMPRO    90 tablet    Take 1 tablet by mouth daily       gabapentin 100 MG capsule    NEURONTIN    180 capsule    Take 2 capsules (200 mg) by mouth 3 times daily       glucose 40 % Gel gel     1 Tube    Take 15-30 g by mouth every 15 minutes as needed.       insulin pen needle 32G X 4 MM    BD MAHESH U/F    100 each    Use up to 3 times daily as needed for insulin dosing       levothyroxine 50 MCG tablet    SYNTHROID/LEVOTHROID    30 tablet    Take 1 tablet (50 mcg) by mouth daily       loratadine 10 MG tablet    CLARITIN     Take 10 mg by mouth daily       modafinil 200 MG tablet    PROVIGIL     Take 2 tablets by mouth Once a Day       multivitamin CF formula Liqd liquid     75 mL    Take 2.5 mLs by mouth daily       NovoLOG penFILL 100 UNITS/ML injection   Generic drug:  insulin aspart     3 Month    Take as directed       ELKE Royal (GREEN)   Generic drug:  Injection Device for insulin      1 Device Inject 1 unit as needed for blood glucose above 140 mg/dL.       omeprazole 40 MG capsule    priLOSEC    180 capsule    Take 1 capsule (40 mg) by mouth 2 times daily Take 30-60 minutes before a meal.        polyethylene glycol Packet    MIRALAX/GLYCOLAX    7 packet    Take 17 g by mouth daily       sennosides 8.6 MG tablet    SENOKOT    120 tablet    Take 2 tablets by mouth 2 times daily       Sharps Container Misc     1 each    For insulin needles       traZODone 50 MG tablet    DESYREL    90 tablet    Take 1 tablet (50 mg) by mouth At Bedtime

## 2017-03-01 NOTE — PROGRESS NOTES
Diabetes Self Management Training: Initial Assessment Visit for Newly Diagnosed Patients (Complete AADE Goals Flowsheet)    Lynn Thompson presents today for education and evaluation of glucose control related to Type 2 diabetes (? Diabetes caused by pancreatectomy).    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    Current Diabetes Management per Patient:  Taking diabetes medications? no    *Abbreviated insulin dose documentation key: Insulin Trade Name (mpfpgcsyl-axcta-rotocm-bedtime) - i.e. Humalog 5-5-5-0 (Humalog 5 units at breakfast, 5 units at lunch, and 5 units at dinner).    Past Diabetes Education: No    Patient glucose self monitoring as follows: three times daily, premeal.   BG meter: Contour Next EZ meter  BG results: fasting glucose- , pre-lunch glucose-  and pre-supper glucose- 68, 75, 88, 108, 116     BG values are: In goal - most  Patient's most recent   Lab Results   Component Value Date    A1C 7.2 11/28/2016    is not meeting goal of <7.0    Nutrition:  Patient eats 3 meals per day.  Uses My Fitness Pal to track intake, 1200 calorie diet     Breakfast (6-7am) - [employer] yogurt, coffee  Lunch (12-1p) - pulled pork sandwich, peapods, strawberries, water   Dinner (6) - 1/2 roast beef sandwich, steamed snap peas    Snacks (hs) - peapods, popcorn, popsicle     Beverages: water, coffee, tea, diet coke  "(rare)    Cultural/Buddhism diet restrictions: No     Biggest Challenge to Healthy Eating: knowing what to eat    Physical Activity:    Type: walking  Duration: 25 minutes  Frequency: 7 days/week    Diabetes Risk Factors:  Pancreatectomy - islet cell transplant    Diabetes Complications:  Acute Complications: At risk for hypoglycemia? yes  Symptoms of low blood sugar? shaking and crabby  Frequency of hypoglycemia: <1x month    Vitals:  Wt 66 kg (145 lb 6.4 oz)  LMP 12/19/2013  BMI 24.96 kg/m2  Estimated body mass index is 24.96 kg/(m^2) as calculated from the following:    Height as of 2/6/17: 1.626 m (5' 4\").    Weight as of this encounter: 66 kg (145 lb 6.4 oz).   Last 3 BP:   BP Readings from Last 3 Encounters:   02/06/17 118/66   02/01/17 110/78   12/19/16 110/60       History   Smoking Status     Never Smoker   Smokeless Tobacco     Never Used       Labs:  Lab Results   Component Value Date    A1C 7.2 11/28/2016     Lab Results   Component Value Date     11/28/2016     Lab Results   Component Value Date    LDL 71 09/30/2016     HDL Cholesterol   Date Value Ref Range Status   09/30/2016 99 >49 mg/dL Final   ]  GFR Estimate   Date Value Ref Range Status   11/28/2016 83 >60 mL/min/1.7m2 Final     Comment:     Non  GFR Calc     GFR Estimate If Black   Date Value Ref Range Status   11/28/2016 >90   GFR Calc   >60 mL/min/1.7m2 Final     Lab Results   Component Value Date    CR 0.73 11/28/2016     No results found for: MICROALBUMIN    Socio/Economic Considerations:    Support system: family    Health Beliefs and Attitudes:   Patient Activation Measure Survey Score:  JENNIFER Score (Last Two) 9/2/2011   JENNIFER Raw Score 40   Activation Score 60   JENNIFER Level 3       Stage of Change: ACTION (Actively working towards change)      Diabetes knowledge and skills assessment:     Patient is knowledgeable in diabetes management concepts related to: Healthy Eating, Being Active and " Monitoring    Patient needs further education on the following diabetes management concepts: Healthy Eating, Being Active, Monitoring and Problem Solving    Barriers to Learning Assessment: No Barriers identified    Based on learning assessment above, most appropriate setting for further diabetes education would be: Group class or Individual setting.    INTERVENTION:   Education provided today on:  AADE Self-Care Behaviors:  Healthy Eating: carbohydrate counting and consistency in amount, composition, and timing of food intake  Being Active: relationship to blood glucose and describe appropriate activity program and precautions for lows  Monitoring: individual blood glucose targets and frequency of monitoring  Problem Solving: high blood glucose - causes, signs/symptoms, treatment and prevention, low blood glucose - causes, signs/symptoms, treatment and prevention and carrying a carbohydrate source at all times    Opportunities for ongoing education and support in diabetes-self management were discussed.    Pt verbalized understanding of concepts discussed and recommendations provided today.       Education Materials Provided:  Toni Understanding Diabetes Booklet, Safe Disposal Options for Needles & Syringes and BG Log Sheet    PLAN:  Meal Plan Recommendation: eat 3 meals a day, have small snacks between meals, if needed, Aim for 2-3 carb servings at meals and 1-2 carb servings at snacks.  Make sure bedtime snack includes a protein source.  Go no longer than 5 hours without a meal/snack.  Exercise / activity plan: as able, goal is 30 minutes daily or 150 minutes weekly  Check blood sugars 3x/day - (current practice)    FOLLOW-UP:  Follow-up as needed.   Chart routed to referring provider.    Ongoing plan for education and support: Written resources (magazines, books, etc.), Follow-up visit with diabetes educator as needed (~3-6 months) and Follow-up with primary care provider    AYLA Tran CDE    Time  Spent: 60 minutes  Encounter Type: Individual    Any diabetes medication dose changes were made via the CDE Protocol and Collaborative Practice Agreement with the patient's referring provider. A copy of this encounter was shared with the provider.

## 2017-03-06 DIAGNOSIS — Z11.59 NEED FOR HEPATITIS C SCREENING TEST: ICD-10-CM

## 2017-03-06 DIAGNOSIS — E03.9 ACQUIRED HYPOTHYROIDISM: ICD-10-CM

## 2017-03-06 LAB
HCV AB SERPL QL IA: NORMAL
T4 FREE SERPL-MCNC: 0.95 NG/DL (ref 0.76–1.46)
TSH SERPL DL<=0.005 MIU/L-ACNC: 4.05 MU/L (ref 0.4–4)

## 2017-03-06 PROCEDURE — 86803 HEPATITIS C AB TEST: CPT | Performed by: FAMILY MEDICINE

## 2017-03-06 PROCEDURE — 84439 ASSAY OF FREE THYROXINE: CPT | Performed by: FAMILY MEDICINE

## 2017-03-06 PROCEDURE — 84443 ASSAY THYROID STIM HORMONE: CPT | Performed by: FAMILY MEDICINE

## 2017-03-06 PROCEDURE — 36415 COLL VENOUS BLD VENIPUNCTURE: CPT | Performed by: FAMILY MEDICINE

## 2017-03-08 RX ORDER — LEVOTHYROXINE SODIUM 75 UG/1
75 TABLET ORAL DAILY
Qty: 90 TABLET | Refills: 0 | Status: SHIPPED | OUTPATIENT
Start: 2017-03-08 | End: 2017-05-25

## 2017-03-13 ENCOUNTER — TRANSFERRED RECORDS (OUTPATIENT)
Dept: HEALTH INFORMATION MANAGEMENT | Facility: CLINIC | Age: 52
End: 2017-03-13

## 2017-03-17 ENCOUNTER — OFFICE VISIT (OUTPATIENT)
Dept: URGENT CARE | Facility: URGENT CARE | Age: 52
End: 2017-03-17
Payer: COMMERCIAL

## 2017-03-17 VITALS
BODY MASS INDEX: 24.75 KG/M2 | DIASTOLIC BLOOD PRESSURE: 74 MMHG | HEART RATE: 78 BPM | SYSTOLIC BLOOD PRESSURE: 118 MMHG | WEIGHT: 145 LBS | TEMPERATURE: 98.8 F | RESPIRATION RATE: 18 BRPM | HEIGHT: 64 IN

## 2017-03-17 DIAGNOSIS — J01.90 ACUTE SINUSITIS WITH SYMPTOMS > 10 DAYS: Primary | ICD-10-CM

## 2017-03-17 PROCEDURE — 99213 OFFICE O/P EST LOW 20 MIN: CPT | Performed by: FAMILY MEDICINE

## 2017-03-17 NOTE — NURSING NOTE
"Chief Complaint   Patient presents with     Urgent Care     Sinus Problem       Initial /74  Pulse 78  Temp 98.8  F (37.1  C) (Oral)  Resp 18  Ht 5' 4\" (1.626 m)  Wt 145 lb (65.8 kg)  LMP 12/19/2013  BMI 24.89 kg/m2 Estimated body mass index is 24.89 kg/(m^2) as calculated from the following:    Height as of this encounter: 5' 4\" (1.626 m).    Weight as of this encounter: 145 lb (65.8 kg).  Medication Reconciliation: complete       Sahara Mcnamara CMA      "

## 2017-03-17 NOTE — MR AVS SNAPSHOT
After Visit Summary   3/17/2017    Lynn Thompson    MRN: [id number]           Patient Information     Date Of Birth          1965        Visit Information        Provider Department      3/17/2017 6:00 PM Ketan Mahoney MD [facility]        Today's Diagnoses     Acute sinusitis with symptoms > 10 days    -  1       Follow-ups after your visit        Your next 10 appointments already scheduled     Apr 11, 2017  3:00 PM CDT   MyChart OB Short with Kavitha Spnecer,    [facility]    [address] [address] [phone]              Who to contact     If you have questions or need follow up information about today's clinic visit or your schedule please contact [facility] directly at [phone].  Normal or non-critical lab and imaging results will be communicated to you by MyChart, letter or phone within 4 business days after the clinic has received the results. If you do not hear from us within 7 days, please contact the clinic through MyChart or phone. If you have a critical or abnormal lab result, we will notify you by phone as soon as possible.  Submit refill requests through [employer] or call your pharmacy and they will forward the refill request to us. Please allow 3 business days for your refill to be completed.          Additional Information About Your Visit        MyChart Information     [employer] gives you secure access to your electronic health record. If you see a primary care provider, you can also send messages to your care team and make appointments. If you have questions, please call your primary care clinic.  If you do not have a primary care provider, please call [phone] and they will assist you.        Care EveryWhere ID     This is your Care EveryWhere ID. This could be used by other organizations to access your [place] medical records  [id number]        Your  "Vitals Were     Pulse Temperature Respirations Height Last Period BMI (Body Mass Index)    78 98.8  F (37.1  C) (Oral) 18 5' 4\" (1.626 m) 12/19/2013 24.89 kg/m2       Blood Pressure from Last 3 Encounters:   03/17/17 118/74   02/06/17 118/66   02/01/17 110/78    Weight from Last 3 Encounters:   03/17/17 145 lb (65.8 kg)   03/01/17 145 lb 6.4 oz (66 kg)   02/06/17 145 lb (65.8 kg)              Today, you had the following     No orders found for display         Today's Medication Changes          These changes are accurate as of: 3/17/17  6:43 PM.  If you have any questions, ask your nurse or doctor.               Start taking these medicines.        Dose/Directions    amoxicillin-clavulanate 875-125 MG per tablet   Commonly known as:  AUGMENTIN   Used for:  Acute sinusitis with symptoms > 10 days   Started by:  Ketan Mahoney MD        Dose:  1 tablet   Take 1 tablet by mouth 2 times daily   Quantity:  20 tablet   Refills:  0            Where to get your medicines      These medications were sent to [id number] - [place], [place] - [address] [address]  [address] [address]     Phone:  [phone]     amoxicillin-clavulanate 875-125 MG per tablet                Primary Care Provider Office Phone # Fax #    Maryana Sivakumar Brooks [phone]       [place] [address] [address]  [facility] [address]        Thank you!     Thank you for choosing [facility]  for your care. Our goal is always to provide you with excellent care. Hearing back from our patients is one way we can continue to improve our services. Please take a few minutes to complete the written survey that you may receive in the mail after your visit with us. Thank you!             Your Updated Medication List - Protect others around you: Learn how to safely use, store and throw away your medicines at www.disposemymeds.org.          This list is accurate as of: 3/17/17  6:43 PM.  Always " use your most recent med list.                   Brand Name Dispense Instructions for use    amoxicillin-clavulanate 875-125 MG per tablet    AUGMENTIN    20 tablet    Take 1 tablet by mouth 2 times daily       amylase-lipase-protease 07878 UNITS Tabs tablet    VIOKACE    600 tablet    4-5 aps with meals and 1-2 with snacks       blood glucose lancing device     1 each    Use to test blood sugars 6 times daily or as directed.       blood glucose monitoring test strip    BILL CONTOUR NEXT    2 Box    Check BS 4-6 times a day       BOOST HIGH PROTEIN Liqd      After above baseline labs are drawn, give: 6 mL/kg to maximum of 360 mL; the beverage is to be consumed within 5 minutes.       busPIRone 15 MG tablet    BUSPAR    120 tablet    Increased dose. 30 mg two times per day.       docusate 50 MG/5ML liquid    COLACE     Take 100 mg by mouth 2 times daily as needed for constipation       DULoxetine 30 MG EC capsule    CYMBALTA    180 capsule    Take 1 capsule (30 mg) by mouth 2 times daily Increased dose.       estradiol 0.1 MG/GM cream    ESTRACE VAGINAL    42.5 g    Place 2 g vaginally twice a week       estrogen (conjugated)-medroxyPROGESTERone 0.3-1.5 MG per tablet    PREMPRO    90 tablet    Take 1 tablet by mouth daily       gabapentin 100 MG capsule    NEURONTIN    180 capsule    Take 2 capsules (200 mg) by mouth 3 times daily       glucose 40 % Gel gel     1 Tube    Take 15-30 g by mouth every 15 minutes as needed.       insulin pen needle 32G X 4 MM    BD MAHESH U/F    100 each    Use up to 3 times daily as needed for insulin dosing       levothyroxine 75 MCG tablet    SYNTHROID/LEVOTHROID    90 tablet    Take 1 tablet (75 mcg) by mouth daily       loratadine 10 MG tablet    CLARITIN     Take 10 mg by mouth daily       modafinil 200 MG tablet    PROVIGIL     Take 2 tablets by mouth Once a Day       multivitamin CF formula Liqd liquid     75 mL    Take 2.5 mLs by mouth daily       NovoLOG penFILL 100 UNITS/ML  injection   Generic drug:  insulin aspart     3 Month    Take as directed       ELKE CARBAJAL (HUNTER) Genny   Generic drug:  Injection Device for insulin      1 Device Inject 1 unit as needed for blood glucose above 140 mg/dL.       omeprazole 40 MG capsule    priLOSEC    180 capsule    Take 1 capsule (40 mg) by mouth 2 times daily Take 30-60 minutes before a meal.       polyethylene glycol Packet    MIRALAX/GLYCOLAX    7 packet    Take 17 g by mouth daily       sennosides 8.6 MG tablet    SENOKOT    120 tablet    Take 2 tablets by mouth 2 times daily       Sharps Container Misc     1 each    For insulin needles       traZODone 50 MG tablet    DESYREL    90 tablet    Take 1 tablet (50 mg) by mouth At Bedtime

## 2017-03-17 NOTE — PROGRESS NOTES
SUBJECTIVE:                                                    Lynn Thompson is a 52 year old female who presents to clinic today for the following health issues:      RESPIRATORY SYMPTOMS      Duration: Tuesday    Description  facial pain/pressure and chills    Severity: moderate    Accompanying signs and symptoms: None    History (predisposing factors):  none    Precipitating or alleviating factors: None    Therapies tried and outcome:  oral decongestant       OBJECTIVE: The patient appears alert and mild distress.   EARS: External ears normal. Canals clear. TM's normal.  NOSE/SINUS: positive findings: mucosa erythematous and swollen  Sinus palpation: Maxillary sinus tender to palpation   THROAT: moderate erythema   NECK:positive findings: moderate anterior cervical nodes   CHEST: Clear    ASSESSMENT: Acute Sinusitis    PLAN: See orders.   In addition, I have suggested that the patient   Push fluids.

## 2017-04-11 ENCOUNTER — PRENATAL OFFICE VISIT (OUTPATIENT)
Dept: OBGYN | Facility: CLINIC | Age: 52
End: 2017-04-11
Payer: COMMERCIAL

## 2017-04-11 VITALS
WEIGHT: 145.6 LBS | BODY MASS INDEX: 24.86 KG/M2 | TEMPERATURE: 99.2 F | HEART RATE: 70 BPM | HEIGHT: 64 IN | SYSTOLIC BLOOD PRESSURE: 118 MMHG | DIASTOLIC BLOOD PRESSURE: 76 MMHG | OXYGEN SATURATION: 98 %

## 2017-04-11 DIAGNOSIS — N95.1 MENOPAUSAL SYNDROME (HOT FLUSHES): Primary | ICD-10-CM

## 2017-04-11 PROCEDURE — 99212 OFFICE O/P EST SF 10 MIN: CPT | Performed by: FAMILY MEDICINE

## 2017-04-11 NOTE — NURSING NOTE
"Chief Complaint   Patient presents with     Recheck Medication       Initial /76 (BP Location: Right arm, Patient Position: Chair, Cuff Size: Adult Regular)  Pulse 70  Temp 99.2  F (37.3  C) (Oral)  Ht 5' 4\" (1.626 m)  Wt 145 lb 9.6 oz (66 kg)  LMP 12/19/2013  SpO2 98%  Breastfeeding? No  BMI 24.99 kg/m2 Estimated body mass index is 24.99 kg/(m^2) as calculated from the following:    Height as of this encounter: 5' 4\" (1.626 m).    Weight as of this encounter: 145 lb 9.6 oz (66 kg).  Medication Reconciliation: complete   NATALIE Bates      "

## 2017-04-11 NOTE — PROGRESS NOTES
Subjective: Lynn Thompson is an 52 year old  woman who presents for follow up on medication. She was previously seen on 16 for pain with intercourse and decreased libido and began prempro and vaginal estrace. Patient followed up on 17 with continued decrease in libido and continued on prempro and vaginal estrace while weaning off narcotics. Today, patient reports she has been doing much better, she has been off narcotics since January and is now on daily suboxone. Denies hot flushes and vaginal dryness has improved.       This document serves as a record of the services and decisions personally performed and made by Kavitha Spencer DO. It was created on his/her behalf by Ibis Oneill, a trained medical scribe. The creation of this document is based the provider's statements to the medical scribe.  Gonzalo Oneill 3:02 PM, 2017      Objective:  /76 (BP Location: Right arm, Patient Position: Chair, Cuff Size: Adult Regular)  Pulse 70  Temp 99.2  F (37.3  C) (Oral)  Wt 66 kg (145 lb 9.6 oz)  LMP 2013  BMI 24.99 kg/m2  General appearance: healthy, alert and no distress         Assessment/Plan:   1) Pain with intercourse and decreased libido: doing better,   continue on vaginal estrace and prempro  2) Return in 4 months for physical    The information in this document, created by the medical scribe for me, accurately reflects the services I personally performed and the decisions made by me. I have reviewed and approved this document for accuracy prior to leaving the patient care area.  Kavitha Spencer DO  3:03 PM, 17    Dr. Kavitha Spencer DO    Obstetrics and Gynecology  [facility] and [place]

## 2017-04-11 NOTE — MR AVS SNAPSHOT
After Visit Summary   4/11/2017    Lynn Thompson    MRN: [id number]           Patient Information     Date Of Birth          1965        Visit Information        Provider Department      4/11/2017 3:00 PM Kavitha Spencer, DO [facility]        Care Instructions    Return in 4 months for physical     Dr. Kavitha Spencer, DO    Obstetrics and Gynecology  [facility] and [place]               Follow-ups after your visit        Follow-up notes from your care team     Return in about 4 months (around 8/11/2017).      Who to contact     If you have questions or need follow up information about today's clinic visit or your schedule please contact [facility] directly at [phone].  Normal or non-critical lab and imaging results will be communicated to you by SpaceCurvehart, letter or phone within 4 business days after the clinic has received the results. If you do not hear from us within 7 days, please contact the clinic through SpaceCurvehart or phone. If you have a critical or abnormal lab result, we will notify you by phone as soon as possible.  Submit refill requests through QPSoftware or call your pharmacy and they will forward the refill request to us. Please allow 3 business days for your refill to be completed.          Additional Information About Your Visit        MyChart Information     QPSoftware gives you secure access to your electronic health record. If you see a primary care provider, you can also send messages to your care team and make appointments. If you have questions, please call your primary care clinic.  If you do not have a primary care provider, please call [phone] and they will assist you.        Care EveryWhere ID     This is your Care EveryWhere ID. This could be used by other organizations to access your [facility] medical records  [id number]        Your Vitals Were     Pulse Temperature Height Last Period Pulse Oximetry  "Breastfeeding?    70 99.2  F (37.3  C) (Oral) 5' 4\" (1.626 m) 12/19/2013 98% No    BMI (Body Mass Index)                   24.99 kg/m2            Blood Pressure from Last 3 Encounters:   04/11/17 118/76   03/17/17 118/74   02/06/17 118/66    Weight from Last 3 Encounters:   04/11/17 145 lb 9.6 oz (66 kg)   03/17/17 145 lb (65.8 kg)   03/01/17 145 lb 6.4 oz (66 kg)              Today, you had the following     No orders found for display       Primary Care Provider Office Phone # Fax #    Maryana Sivakumar Brooks [phone]       [facility] [address]  [facility] [address]        Thank you!     Thank you for choosing [facility]  for your care. Our goal is always to provide you with excellent care. Hearing back from our patients is one way we can continue to improve our services. Please take a few minutes to complete the written survey that you may receive in the mail after your visit with us. Thank you!             Your Updated Medication List - Protect others around you: Learn how to safely use, store and throw away your medicines at www.disposemymeds.org.          This list is accurate as of: 4/11/17  3:16 PM.  Always use your most recent med list.                   Brand Name Dispense Instructions for use    amylase-lipase-protease 27057 UNITS Tabs tablet    VIOKACE    600 tablet    4-5 aps with meals and 1-2 with snacks       blood glucose lancing device     1 each    Use to test blood sugars 6 times daily or as directed.       blood glucose monitoring test strip    BILL CONTOUR NEXT    2 Box    Check BS 4-6 times a day       BOOST HIGH PROTEIN Liqd      After above baseline labs are drawn, give: 6 mL/kg to maximum of 360 mL; the beverage is to be consumed within 5 minutes.       busPIRone 15 MG tablet    BUSPAR    120 tablet    Increased dose. 30 mg two times per day.       docusate 50 MG/5ML liquid    COLACE     Take 100 mg by mouth 2 times daily as needed for " constipation       DULoxetine 30 MG EC capsule    CYMBALTA    180 capsule    Take 1 capsule (30 mg) by mouth 2 times daily Increased dose.       estradiol 0.1 MG/GM cream    ESTRACE VAGINAL    42.5 g    Place 2 g vaginally twice a week       estrogen (conjugated)-medroxyPROGESTERone 0.3-1.5 MG per tablet    PREMPRO    90 tablet    Take 1 tablet by mouth daily       gabapentin 100 MG capsule    NEURONTIN    180 capsule    Take 2 capsules (200 mg) by mouth 3 times daily       glucose 40 % Gel gel     1 Tube    Take 15-30 g by mouth every 15 minutes as needed.       insulin pen needle 32G X 4 MM    BD MAHESH U/F    100 each    Use up to 3 times daily as needed for insulin dosing       levothyroxine 75 MCG tablet    SYNTHROID/LEVOTHROID    90 tablet    Take 1 tablet (75 mcg) by mouth daily       loratadine 10 MG tablet    CLARITIN     Take 10 mg by mouth daily       modafinil 200 MG tablet    PROVIGIL     Take 2 tablets by mouth Once a Day       multivitamin CF formula Liqd liquid     75 mL    Take 2.5 mLs by mouth daily       NovoLOG penFILL 100 UNITS/ML injection   Generic drug:  insulin aspart     3 Month    Take as directed       ELKE Royal (GREEN)   Generic drug:  Injection Device for insulin      1 Device Inject 1 unit as needed for blood glucose above 140 mg/dL.       omeprazole 40 MG capsule    priLOSEC    180 capsule    Take 1 capsule (40 mg) by mouth 2 times daily Take 30-60 minutes before a meal.       polyethylene glycol Packet    MIRALAX/GLYCOLAX    7 packet    Take 17 g by mouth daily       sennosides 8.6 MG tablet    SENOKOT    120 tablet    Take 2 tablets by mouth 2 times daily       Sharps Container Misc     1 each    For insulin needles       traZODone 50 MG tablet    DESYREL    90 tablet    Take 1 tablet (50 mg) by mouth At Bedtime

## 2017-04-11 NOTE — PATIENT INSTRUCTIONS
Return in 4 months for physical     Dr. Kavitha Spencer, DO    Obstetrics and Gynecology  [facility] [place] and [place]

## 2017-04-27 ENCOUNTER — TRANSFERRED RECORDS (OUTPATIENT)
Dept: HEALTH INFORMATION MANAGEMENT | Facility: CLINIC | Age: 52
End: 2017-04-27

## 2017-05-11 ENCOUNTER — TRANSFERRED RECORDS (OUTPATIENT)
Dept: HEALTH INFORMATION MANAGEMENT | Facility: CLINIC | Age: 52
End: 2017-05-11

## 2017-05-18 ENCOUNTER — TELEPHONE (OUTPATIENT)
Dept: FAMILY MEDICINE | Facility: CLINIC | Age: 52
End: 2017-05-18

## 2017-05-18 NOTE — TELEPHONE ENCOUNTER
Panel Management Review      Patient has the following on her problem list:       Composite cancer screening  Chart review shows that this patient is due/due soon for the following NoneNone  Summary:          Questions for provider review:    Pt is on the diabetes management fail list. DM. 3-1-17 pt had dm education. When is next appt needed with you.                                                                                                                                    [facility]           Chart routed to Provider .

## 2017-05-23 ENCOUNTER — TELEPHONE (OUTPATIENT)
Dept: FAMILY MEDICINE | Facility: CLINIC | Age: 52
End: 2017-05-23

## 2017-05-23 DIAGNOSIS — E03.9 ACQUIRED HYPOTHYROIDISM: ICD-10-CM

## 2017-05-23 NOTE — TELEPHONE ENCOUNTER
Mychart sent.  Pt due for lab only recheck due to dose change  Neel Zarate RN, BSN      Levo     Last Written Prescription Date: 3/8/17  Last Quantity: 90, # refills: 0  Last Office Visit with [facility] or [facility] prescribing provider: 2/1/17   Next 5 appointments (look out 90 days)     May 25, 2017  3:45 PM CDT   [facility]nn [facility]   [facility]    [address]  [facility] [address] [phone]                   TSH   Date Value Ref Range Status   03/06/2017 4.05 (H) 0.40 - 4.00 mU/L Final

## 2017-05-25 ENCOUNTER — OFFICE VISIT (OUTPATIENT)
Dept: PSYCHIATRY | Facility: CLINIC | Age: 52
End: 2017-05-25
Payer: COMMERCIAL

## 2017-05-25 VITALS
OXYGEN SATURATION: 98 % | WEIGHT: 145 LBS | SYSTOLIC BLOOD PRESSURE: 112 MMHG | BODY MASS INDEX: 24.75 KG/M2 | DIASTOLIC BLOOD PRESSURE: 64 MMHG | HEIGHT: 64 IN | HEART RATE: 76 BPM | TEMPERATURE: 97.9 F

## 2017-05-25 DIAGNOSIS — G47.00 PERSISTENT INSOMNIA: ICD-10-CM

## 2017-05-25 DIAGNOSIS — F41.9 ANXIETY: ICD-10-CM

## 2017-05-25 PROCEDURE — 99214 OFFICE O/P EST MOD 30 MIN: CPT | Performed by: NURSE PRACTITIONER

## 2017-05-25 RX ORDER — TRAZODONE HYDROCHLORIDE 50 MG/1
50 TABLET, FILM COATED ORAL AT BEDTIME
Qty: 90 TABLET | Refills: 1 | Status: ON HOLD | OUTPATIENT
Start: 2017-05-25 | End: 2017-08-25

## 2017-05-25 RX ORDER — DULOXETIN HYDROCHLORIDE 30 MG/1
30 CAPSULE, DELAYED RELEASE ORAL 2 TIMES DAILY
Qty: 180 CAPSULE | Refills: 1 | Status: SHIPPED | OUTPATIENT
Start: 2017-05-25 | End: 2019-03-01

## 2017-05-25 RX ORDER — LEVOTHYROXINE SODIUM 75 UG/1
75 TABLET ORAL DAILY
Qty: 10 TABLET | Refills: 0 | Status: SHIPPED | OUTPATIENT
Start: 2017-05-25 | End: 2017-06-01

## 2017-05-25 RX ORDER — BUPRENORPHINE AND NALOXONE 2; .5 MG/1; MG/1
0.5 FILM, SOLUBLE BUCCAL; SUBLINGUAL EVERY MORNING
COMMUNITY
End: 2019-04-27

## 2017-05-25 ASSESSMENT — ANXIETY QUESTIONNAIRES
6. BECOMING EASILY ANNOYED OR IRRITABLE: SEVERAL DAYS
IF YOU CHECKED OFF ANY PROBLEMS ON THIS QUESTIONNAIRE, HOW DIFFICULT HAVE THESE PROBLEMS MADE IT FOR YOU TO DO YOUR WORK, TAKE CARE OF THINGS AT HOME, OR GET ALONG WITH OTHER PEOPLE: SOMEWHAT DIFFICULT
GAD7 TOTAL SCORE: 4
1. FEELING NERVOUS, ANXIOUS, OR ON EDGE: SEVERAL DAYS
2. NOT BEING ABLE TO STOP OR CONTROL WORRYING: SEVERAL DAYS
7. FEELING AFRAID AS IF SOMETHING AWFUL MIGHT HAPPEN: NOT AT ALL
3. WORRYING TOO MUCH ABOUT DIFFERENT THINGS: SEVERAL DAYS
5. BEING SO RESTLESS THAT IT IS HARD TO SIT STILL: NOT AT ALL

## 2017-05-25 ASSESSMENT — PATIENT HEALTH QUESTIONNAIRE - PHQ9: 5. POOR APPETITE OR OVEREATING: NOT AT ALL

## 2017-05-25 NOTE — Clinical Note
Javier Brooks I am returning care back to you. More details in my note. If you need anything, please let me know Domonique

## 2017-05-25 NOTE — PATIENT INSTRUCTIONS
Treatment Plan:    Continue Buspar (buspirone) 30 mg by mouth in AM and 30 mg in PM.    Continue Cymbalta (duloxetine) 30 mg by mouth in AM and 30 mg in PM.    Continue trazodone 50 mg by mouth at bedtime for sleep.    Continue Neurontin (gabapentin) 200 mg by mouth 3 times per day per pain specialist.    Continue Provigil (modafinil) 400 mg by mouth daily in AM per sleep specialist.    Continue Suboxone per Addiction Medicine specialist.    Continue all other medications as reviewed per electronic medical record today.     All questions addressed. Education and counseling completed regarding risks and benefits of medications and psychotherapy options.    Safety plan was reviewed. To the Emergency Department as needed or call after hours crisis line at [phone] or [phone].     Continue individual therapy as planned with Dr. Rina Watt.    Schedule an appointment with me in as needed. Call [facility] at [phone] to schedule.    Follow up with primary care provider as planned or for acute medical concerns.    Call the psychiatric nurse line with medication questions or concerns at [phone].    My Practice Policy was reviewed and signed: YES     MyChart may be used to communicate with your provider, but this is not intended to be used for emergencies.

## 2017-05-25 NOTE — PROGRESS NOTES
"    Outpatient Psychiatric Progress Note    Name: Lynn Thompson   : 1965                    Primary Care Provider: Maryana Brooks MD - last visit 2017  Therapist: Dr. Rina Watt- last visit 2017    PHQ-9 scores:  PHQ-9 SCORE 2017   Total Score 3 5 5       TAMIE-7 scores:  TAMIE-7 SCORE 2017   Total Score 5 6 4       Patient Identification:  Patient is a 52 year old year old,   White American female  who presents for return visit with me.  Patient is currently on medical leave from Full Time Job . Patient attended the session alone. Patient prefers to be called: \"Lynn\".    Interim History:    I last saw Lynn Thompson for outpatient psychiatry Return Visit on 2017.     During that appointment, we Continue Buspar (buspirone) 30 mg by mouth in AM and 30 mg in PM.    Continue Cymbalta (duloxetine) to 30 mg in AM and 30 mg in PM.     Continue trazodone 50 mg by mouth at bedtime for sleep.    Continue Neurontin (gabapentin) 200 mg 3 times per day per pain specialist.    Continue Provigil (modafinil) 400 mg in AM per sleep specialist.    Continue Ativan (lorazepam) as needed for panic per primary care provider.      Current medications include:   Current Outpatient Prescriptions   Medication Sig     levothyroxine (SYNTHROID/LEVOTHROID) 75 MCG tablet Take 1 tablet (75 mcg) by mouth daily     buprenorphine HCl-naloxone HCl (SUBOXONE) 2-0.5 MG per film Place 1 Film under the tongue daily Pt takes 1/2 bid     busPIRone (BUSPAR) 15 MG tablet Increased dose. 30 mg two times per day.     DULoxetine (CYMBALTA) 30 MG EC capsule Take 1 capsule (30 mg) by mouth 2 times daily Increased dose.     traZODone (DESYREL) 50 MG tablet Take 1 tablet (50 mg) by mouth At Bedtime     omeprazole (PRILOSEC) 40 MG capsule Take 1 capsule (40 mg) by mouth 2 times daily Take 30-60 minutes before a meal.     amylase-lipase-protease (VIOKACE) 66680 UNITS TABS " tablet 4-5 aps with meals and 1-2 with snacks     blood glucose (BILL MICROLET 2) lancing device Use to test blood sugars 6 times daily or as directed.     estradiol (ESTRACE VAGINAL) 0.1 MG/GM vaginal cream Place 2 g vaginally twice a week     estrogen, conjugated,-medroxyPROGESTERone (PREMPRO) 0.3-1.5 MG per tablet Take 1 tablet by mouth daily     Nutritional Supplements (BOOST HIGH PROTEIN) LIQD After above baseline labs are drawn, give: 6 mL/kg to maximum of 360 mL; the beverage is to be consumed within 5 minutes.     sennosides (SENOKOT) 8.6 MG tablet Take 2 tablets by mouth 2 times daily     docusate (COLACE) 50 MG/5ML liquid Take 100 mg by mouth 2 times daily as needed for constipation     Injection Device for insulin (NOVOPEN JRHUNTER,) MODESTA 1 Device Inject 1 unit as needed for blood glucose above 140 mg/dL.     loratadine (CLARITIN) 10 MG tablet Take 10 mg by mouth daily     insulin pen needle (BD MAHESH U/F) 32G X 4 MM Use up to 3 times daily as needed for insulin dosing     blood glucose monitoring (BILL CONTOUR NEXT) test strip Check BS 4-6 times a day     gabapentin (NEURONTIN) 100 MG capsule Take 2 capsules (200 mg) by mouth 3 times daily     polyethylene glycol (MIRALAX/GLYCOLAX) packet Take 17 g by mouth daily     multivitamin CF formula (AQUADEKS) LIQD liquid Take 2.5 mLs by mouth daily     modafinil (PROVIGIL) 200 MG tablet Take 2 tablets by mouth Once a Day     NOVOLOG PENFILL SOLN 100 UNIT/ML Take as directed     glucose 40 % GEL Take 15-30 g by mouth every 15 minutes as needed.     Sharps Container MISC For insulin needles     [DISCONTINUED] levothyroxine (SYNTHROID/LEVOTHROID) 75 MCG tablet Take 1 tablet (75 mcg) by mouth daily     No current facility-administered medications for this visit.        The [place] Prescription Monitoring Program has been reviewed and there are no concerns about diversionary activity for controlled substances at this time.      I was able to review most recent  "Primary Care Provider, specialty provider, and therapy visit notes that I have access to.     Lynn HENNING Thompson reports mood has been: \"good\" no hypomania or yesica  Anxiety has been: \"a little more\" specifically related to employment, but has been coping. No panic. Has not used Ativan (lorazepam) recently.   Sleep has been: \"good\" practices sleep hygiene  PHQ9 and GAD7 scores were reviewed today.   Medication side effects: Denies  Current stressors include: Occupational Difficulties  Coping mechanisms and supports include: Exercise, Deep Breathing, Therapy, Family, Hobbies and Friends    Past Medical History:   Diagnosis Date     Abdominal pain, epigastric 11/05, ongoing; goes to pain clinic    probable recurrent spasm sphincter of Oddi causing biliary colic pains      Benign paroxysmal positional vertigo     occ.      Calculus of kidney 5/05    x1 on L side passed, several stones.  Has been tested for oxalate.     Chronic abdominal pain 7/17/2013     Chronic pain     chronic pancreatitis     Chronic pancreatitis (H) 7/17/2013     Depressive disorder      Depressive disorder, not elsewhere classified     also occ panic spells     Diabetes (H)     post pancreatectomy     Dyspepsia and other specified disorders of function of stomach 6/99    H. 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     Pyelonephritis, unspecified 5/04, 10/8    L side     Regional enteritis of unspecified site 1987    inacive/remission     Sleep apnea     uses Cpap     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    ERCP with Stent of CBD by Fernandez @ " [facility] with sx relief     Spasm of sphincter of Oddi     surgical + endoscopic stenting of pancreatic duct @ [facility] 5/23/06     Thyroid nodule 11/1/2016     Ureteral calculus 10/2/2012     Vaccination not carried out       has a past medical history of Abdominal pain, epigastric (11/05, ongoing; goes to pain clinic); Benign paroxysmal positional vertigo; Calculus of kidney (5/05); Chronic abdominal pain (7/17/2013); Chronic pain; Chronic pancreatitis (H) (7/17/2013); Depressive disorder; Depressive disorder, not elsewhere classified; Diabetes (H); Dyspepsia and other specified disorders of function of stomach (6/99); Headache (12/28/2004); Headache (12/28/2004); Headache (12/28/2004); Headache(784.0); Hypertension (2/22/16); Iron deficiency anemia secondary to inadequate dietary iron intake (11/03); Obesity, unspecified; Other chronic pain; Pancreatic disease; Post-pancreatectomy diabetes (H) (5/17/2013); Pyelonephritis, unspecified (5/04, 10/8); Regional enteritis of unspecified site (1987); Sleep apnea; Spasm of sphincter of Oddi (9/02); Spasm of sphincter of Oddi; Thyroid nodule (11/1/2016); Ureteral calculus (10/2/2012); and Vaccination not carried out. She also has no past medical history of Arthritis; Cerebral infarction (H); Chronic infection; Congestive heart failure, unspecified; COPD (chronic obstructive pulmonary disease) (H); Heart disease; History of blood transfusion; Malignant hyperthermia; or Uncomplicated asthma.    Social History:  Current Living situation:  [place], [place] with Spouse/Partner and daughter and pets. Feels safe at home.  Employment: [employer] is still pending and if approved, will go until February 2, 2017. She has been trying to contact her workplace about other part time options. Her position has been filled. She needs a release to return back to work before applying for other options through her workplace.   Current use of drugs or alcohol: Denies    Tobacco use: No  Recovery Programming  "Involvement: None    Vital Signs:   /64 (BP Location: Right arm, Patient Position: Chair, Cuff Size: Adult Regular)  Pulse 76  Temp 97.9  F (36.6  C) (Oral)  Ht 5' 4\" (1.626 m)  Wt 145 lb (65.8 kg)  LMP 12/19/2013  SpO2 98%  BMI 24.89 kg/m2    Labs:  Most recent laboratory results reviewed and the pertinent results include:   Orders Only on 03/06/2017   Component Date Value Ref Range Status     TSH 03/06/2017 4.05* 0.40 - 4.00 mU/L Final     Hepatitis C Antibody 03/06/2017   NR Final                    Value:Nonreactive   Assay performance characteristics have not been established for newborns,   infants, and children       T4 Free 03/06/2017 0.95  0.76 - 1.46 ng/dL Final       Review of Systems:  10 systems (general, cardiovascular, respiratory, eyes, ENT, endocrine, GI, , M/S, neurological) were reviewed. Most pertinent finding(s) is/are: chronic pain . The remaining systems are all unremarkable.    Mental Status Examination:  Appearance:  awake, alert, appeared as age stated, adequate grooming, alert, cooperative and no distress  Attitude:  cooperative  Eye Contact:  good, wears glasses  Gait and Station: Normal  Mood:  \"better\"  Affect:  mood congruent, tearful at times  Speech:  clear, coherent  Psychomotor Behavior:  no evidence of tardive dyskinesia, dystonia, or tics  Thought Process:  logical, linear and goal oriented  Associations:  no loose associations  Thought Content:  no evidence of suicidal ideation or homicidal ideation, no evidence of psychotic thought, no auditory hallucinations present and no visual hallucinations present  Insight:  good  Judgment:  intact  Oriented to:  time, person, and place  Attention Span and Concentration:  fair  Recent and Remote Memory:  intact Not formally assessed. no amnesia  Fund of Knowledge: appropriate     Suicide Risk Assessment:  Today Lynn Thompson reports ongoing stress related to psychosocial stressors, but has identified more coping skills. " In addition, there are notable risk factors for self-harm, including age and anxiety. However, risk is mitigated by family, sobriety, absence of past attempts, ability to volunteer a safety plan, history of seeking help when needed, future oriented, no access to firearms or weapons, denies suicidal intent or plan, no family history of suicide and denies homicidal ideation, intent, or plan. Therefore, based on all available evidence including the factors cited above, Lynn Thompson does not appear to be at imminent risk for self-harm, does not meet criteria for a 72-hr hold, and therefore remains appropriate for ongoing outpatient level of care.  A thorough assessment of risk factors related to suicide and self-harm have been reviewed and are noted above. The patient convincingly denies suicidality on several occasions. There was no deceit detected, and the patient presented in a manner that was believable.      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)     Medical comorbidities include:   Patient Active Problem List    Diagnosis Date Noted     Health Care Home 05/03/2011     Priority: High     EMERGENCY CARE PLAN  Presenting Problem Signs and Symptoms Treatment Plan    Questions or conerns during clinic hours    I will call the clinic directly     Questions or conerns outside clinic hours    I will call the 24 hour nurse line at [phone]    Patient needs to schedule an appointment    I will call the 24 hour scheduling team at [phone] or clinic directly    Same day treatment     I will call the clinic first, nurse line if after hours, urgent care and express care if needed                            DX V65.8 REPLACED WITH [id number] [facility] (04/08/2013)       Post-pancreatectomy diabetes (H)  02/21/2017     Priority: Medium     Decreased libido 12/14/2016     Priority: Medium     Acquired hypothyroidism 11/03/2016     Priority: Medium     Thyroid nodule 11/01/2016     Priority: Medium     Dysthymia 03/01/2016     Priority: Medium     Anxiety 03/01/2016     Priority: Medium     S/P exploratory laparotomy 12/29/2015     Priority: Medium     BLU (obstructive sleep apnea) 12/23/2015     Priority: Medium     Asplenia 10/24/2014     Priority: Medium     Acquired absence of pancreas 05/17/2013     Priority: Medium     Exocrine pancreatic insufficiency 05/17/2013     Priority: Medium     Chronic pain syndrome 02/23/2013     Priority: Medium     Gastric bypass status for obesity 10/26/2010     Priority: Medium     Iron deficiency anemia secondary to inadequate dietary iron intake 12/28/2004     Priority: Medium     relates to gastric bypass         Psychosocial & Contextual Factors: Occupational Difficulties, Parent/Child Relationship Difficulties, Financial Difficulties, Relationship Difficulties, Phase of Life Difficulties and Medical Comorbidites    Assessment:  Lynn MILADY Thompson reports still experiencing anxiety specific to psychosocial stressors. Medication side effects and alternatives were reviewed. Health promotion activities recommended and reviewed today. Continue multiple day dosing of medications due to gastric bypass surgery history. She remains on low dose of Suboxone (buprenorphine) and plans to discontinue this medication. Has not taken Ativan (lorazepam) over the last few months. Now has a oral appliance splint and that has been working better.  Reviewed the progress she has made with our work together. I recommend ongoing with her therapist. We discussed a return back to her Primary Care Provider.     Thank you for our work together in the Psychiatry Collaborative Care Model at [facility]. This is our last visit and I am returning your care back to your Primary Care Provider  Maryana Brooks MD . If you are not doing well, please contact your Primary Care Provider office.        Treatment Plan:    Continue Buspar (buspirone) 30 mg by mouth in AM and 30 mg in PM.    Continue Cymbalta (duloxetine) 30 mg by mouth in AM and 30 mg in PM.    Continue trazodone 50 mg by mouth at bedtime for sleep.    Continue Neurontin (gabapentin) 200 mg by mouth 3 times per day per pain specialist.    Continue Provigil (modafinil) 400 mg by mouth daily in AM per sleep specialist.    Continue Suboxone per Addiction Medicine specialist.    Continue all other medications as reviewed per electronic medical record today.     All questions addressed. Education and counseling completed regarding risks and benefits of medications and psychotherapy options.    Safety plan was reviewed. To the Emergency Department as needed or call after hours crisis line at [phone] or [phone].     Continue individual therapy as planned with Dr. Rina Watt.    Schedule an appointment with me in as needed. Call [facility] at [phone] to schedule.    Follow up with primary care provider as planned or for acute medical concerns.    Call the psychiatric nurse line with medication questions or concerns at [phone].    My Practice Policy was reviewed and signed: YES     [facility] may be used to communicate with your provider, but this is not intended to be used for emergencies.    Administrative Billing:   Time spent with patient was 30 minutes and greater than 50% of time or 20 minutes was spent in counseling and coordination of care.    Patient Status:  The patient is being returned to the referring provider for ongoing care and medication prescribing.  The patient can be referred back to this service for further consultation as needed.    Signed:   Domonique Glynn, PhD, APRN, CNP   Psychiatry

## 2017-05-25 NOTE — NURSING NOTE
"Chief Complaint   Patient presents with     RECHECK     LOV 2-6-17 for anxiety, persistent insomnia. pt states better on both than previously       Initial /64 (BP Location: Right arm, Patient Position: Chair, Cuff Size: Adult Regular)  Pulse 76  Temp 97.9  F (36.6  C) (Oral)  Ht 5' 4\" (1.626 m)  Wt 145 lb (65.8 kg)  LMP 12/19/2013  SpO2 98%  BMI 24.89 kg/m2 Estimated body mass index is 24.89 kg/(m^2) as calculated from the following:    Height as of this encounter: 5' 4\" (1.626 m).    Weight as of this encounter: 145 lb (65.8 kg).  Medication Reconciliation: complete    "

## 2017-05-25 NOTE — MR AVS SNAPSHOT
After Visit Summary   5/25/2017    Lynn Thompson    MRN: [id number]           Patient Information     Date Of Birth          1965        Visit Information        Provider Department      5/25/2017 3:45 PM Domonique Glynn NP [facility]        Today's Diagnoses     Persistent insomnia        Anxiety          Care Instructions    Treatment Plan:    Continue Buspar (buspirone) 30 mg by mouth in AM and 30 mg in PM.    Continue Cymbalta (duloxetine) 30 mg by mouth in AM and 30 mg in PM.    Continue trazodone 50 mg by mouth at bedtime for sleep.    Continue Neurontin (gabapentin) 200 mg by mouth 3 times per day per pain specialist.    Continue Provigil (modafinil) 400 mg by mouth daily in AM per sleep specialist.    Continue Suboxone per Addiction Medicine specialist.    Continue all other medications as reviewed per electronic medical record today.     All questions addressed. Education and counseling completed regarding risks and benefits of medications and psychotherapy options.    Safety plan was reviewed. To the Emergency Department as needed or call after hours crisis line at [phone] or [phone].     Continue individual therapy as planned with Dr. Rina Watt.    Schedule an appointment with me in as needed. Call [facility] at [phone] to schedule.    Follow up with primary care provider as planned or for acute medical concerns.    Call the psychiatric nurse line with medication questions or concerns at [phone].    My Practice Policy was reviewed and signed: YES     MyChart may be used to communicate with your provider, but this is not intended to be used for emergencies.            Follow-ups after your visit        Follow-up notes from your care team     Return if symptoms worsen or fail to improve.      Who to contact     If you have questions or need follow up information about today's clinic visit or your schedule please contact  "[facility] directly at [phone].  Normal or non-critical lab and imaging results will be communicated to you by MyChart, letter or phone within 4 business days after the clinic has received the results. If you do not hear from us within 7 days, please contact the clinic through [employer] or phone. If you have a critical or abnormal lab result, we will notify you by phone as soon as possible.  Submit refill requests through [employer] or call your pharmacy and they will forward the refill request to us. Please allow 3 business days for your refill to be completed.          Additional Information About Your Visit        [employer] Information     [employer] gives you secure access to your electronic health record. If you see a primary care provider, you can also send messages to your care team and make appointments. If you have questions, please call your primary care clinic.  If you do not have a primary care provider, please call [phone] and they will assist you.        Care EveryWhere ID     This is your Care EveryWhere ID. This could be used by other organizations to access your [place] medical records  [id number]        Your Vitals Were     Pulse Temperature Height Last Period Pulse Oximetry BMI (Body Mass Index)    76 97.9  F (36.6  C) (Oral) 5' 4\" (1.626 m) 12/19/2013 98% 24.89 kg/m2       Blood Pressure from Last 3 Encounters:   05/25/17 112/64   04/11/17 118/76   03/17/17 118/74    Weight from Last 3 Encounters:   05/25/17 145 lb (65.8 kg)   04/11/17 145 lb 9.6 oz (66 kg)   03/17/17 145 lb (65.8 kg)              Today, you had the following     No orders found for display         Today's Medication Changes          These changes are accurate as of: 5/25/17  4:31 PM.  If you have any questions, ask your nurse or doctor.               These medicines have changed or have updated prescriptions.        Dose/Directions    DULoxetine 30 MG EC capsule   Commonly known as:  CYMBALTA   This may have " changed:  additional instructions   Used for:  Anxiety   Changed by:  Domonique Glynn NP        Dose:  30 mg   Take 1 capsule (30 mg) by mouth 2 times daily Fill at patient request.   Quantity:  180 capsule   Refills:  1       traZODone 50 MG tablet   Commonly known as:  DESYREL   This may have changed:  additional instructions   Used for:  Persistent insomnia   Changed by:  Domonique Glynn NP        Dose:  50 mg   Take 1 tablet (50 mg) by mouth At Bedtime Fill at patient request.   Quantity:  90 tablet   Refills:  1            Where to get your medicines      These medications were sent to [id number] - [place], [place] - [address]  [address], [facility] [address]     Phone:  [phone]     DULoxetine 30 MG EC capsule    traZODone 50 MG tablet                Primary Care Provider Office Phone # Fax #    Maryana Sivakumar Brooks [phone]       [facility] [address]  [facility] [address]        Thank you!     Thank you for choosing [facility]  for your care. Our goal is always to provide you with excellent care. Hearing back from our patients is one way we can continue to improve our services. Please take a few minutes to complete the written survey that you may receive in the mail after your visit with us. Thank you!             Your Updated Medication List - Protect others around you: Learn how to safely use, store and throw away your medicines at www.disposemymeds.org.          This list is accurate as of: 5/25/17  4:31 PM.  Always use your most recent med list.                   Brand Name Dispense Instructions for use    amylase-lipase-protease 39223 UNITS Tabs tablet    VIOKACE    600 tablet    4-5 aps with meals and 1-2 with snacks       blood glucose lancing device     1 each    Use to test blood sugars 6 times daily or as directed.       blood glucose monitoring test strip    BILL CONTOUR NEXT    2 Box    Check BS 4-6 times a day        BOOST HIGH PROTEIN Liqd      After above baseline labs are drawn, give: 6 mL/kg to maximum of 360 mL; the beverage is to be consumed within 5 minutes.       buprenorphine HCl-naloxone HCl 2-0.5 MG per film    SUBOXONE     Place 1 Film under the tongue daily Pt takes 1/2 bid       busPIRone 15 MG tablet    BUSPAR    120 tablet    Increased dose. 30 mg two times per day.       docusate 50 MG/5ML liquid    COLACE     Take 100 mg by mouth 2 times daily as needed for constipation       DULoxetine 30 MG EC capsule    CYMBALTA    180 capsule    Take 1 capsule (30 mg) by mouth 2 times daily Fill at patient request.       estradiol 0.1 MG/GM cream    ESTRACE VAGINAL    42.5 g    Place 2 g vaginally twice a week       estrogen (conjugated)-medroxyPROGESTERone 0.3-1.5 MG per tablet    PREMPRO    90 tablet    Take 1 tablet by mouth daily       gabapentin 100 MG capsule    NEURONTIN    180 capsule    Take 2 capsules (200 mg) by mouth 3 times daily       glucose 40 % Gel gel     1 Tube    Take 15-30 g by mouth every 15 minutes as needed.       insulin pen needle 32G X 4 MM    BD MAHESH U/F    100 each    Use up to 3 times daily as needed for insulin dosing       levothyroxine 75 MCG tablet    SYNTHROID/LEVOTHROID    10 tablet    Take 1 tablet (75 mcg) by mouth daily       loratadine 10 MG tablet    CLARITIN     Take 10 mg by mouth daily       modafinil 200 MG tablet    PROVIGIL     Take 2 tablets by mouth Once a Day       multivitamin CF formula Liqd liquid     75 mL    Take 2.5 mLs by mouth daily       NovoLOG penFILL 100 UNITS/ML injection   Generic drug:  insulin aspart     3 Month    Take as directed       ELKE Royal (GREEN)   Generic drug:  Injection Device for insulin      1 Device Inject 1 unit as needed for blood glucose above 140 mg/dL.       omeprazole 40 MG capsule    priLOSEC    180 capsule    Take 1 capsule (40 mg) by mouth 2 times daily Take 30-60 minutes before a meal.       polyethylene glycol Packet     MIRALAX/GLYCOLAX    7 packet    Take 17 g by mouth daily       sennosides 8.6 MG tablet    SENOKOT    120 tablet    Take 2 tablets by mouth 2 times daily       Sharps Container Misc     1 each    For insulin needles       traZODone 50 MG tablet    DESYREL    90 tablet    Take 1 tablet (50 mg) by mouth At Bedtime Fill at patient request.

## 2017-05-25 NOTE — PROGRESS NOTES
"    Outpatient Psychiatric Progress Note    Name: Lynn Thompson   : 1965                    Primary Care Provider: Maryana Brooks MD - last visit 2017  Therapist: Dr. Rina Watt- last visit ***    PHQ-9 scores:  PHQ-9 SCORE 2017   Total Score - - -   Total Score MyChart - - -   Total Score 3 5 5       TAMIE-7 scores:  TAMIE-7 SCORE 2017   Total Score - - -   Total Score 5 6 4       Patient Identification:  Patient is a 52 year old year old,   White American female  who presents for return visit with me.  Patient is currently on medical leave from Full Time Job . Patient attended the session alone. Patient prefers to be called: \"Lynn\"    Interim History:    I last saw Lynn Thompson for outpatient psychiatry Return Visit on 2017.     During that appointment, we Continue Buspar (buspirone) 30 mg by mouth in AM and 30 mg in PM.    Continue Cymbalta (duloxetine) to 30 mg in AM and 30 mg in PM.     Continue trazodone 50 mg by mouth at bedtime for sleep.    Continue Neurontin (gabapentin) 200 mg 3 times per day per pain specialist.    Continue Provigil (modafinil) 400 mg in AM per sleep specialist.  Continue Ativan (lorazepam) as needed for panic per primary care provider.  .   Current medications include:   Current Outpatient Prescriptions   Medication Sig     levothyroxine (SYNTHROID/LEVOTHROID) 75 MCG tablet Take 1 tablet (75 mcg) by mouth daily     buprenorphine HCl-naloxone HCl (SUBOXONE) 2-0.5 MG per film Place 1 Film under the tongue daily Pt takes 1/2 bid     busPIRone (BUSPAR) 15 MG tablet Increased dose. 30 mg two times per day.     DULoxetine (CYMBALTA) 30 MG EC capsule Take 1 capsule (30 mg) by mouth 2 times daily Increased dose.     traZODone (DESYREL) 50 MG tablet Take 1 tablet (50 mg) by mouth At Bedtime     omeprazole (PRILOSEC) 40 MG capsule Take 1 capsule (40 mg) by mouth 2 times daily Take 30-60 minutes before a meal. "     amylase-lipase-protease (VIOKACE) 38013 UNITS TABS tablet 4-5 aps with meals and 1-2 with snacks     blood glucose (BILL MICROLET 2) lancing device Use to test blood sugars 6 times daily or as directed.     estradiol (ESTRACE VAGINAL) 0.1 MG/GM vaginal cream Place 2 g vaginally twice a week     estrogen, conjugated,-medroxyPROGESTERone (PREMPRO) 0.3-1.5 MG per tablet Take 1 tablet by mouth daily     Nutritional Supplements (BOOST HIGH PROTEIN) LIQD After above baseline labs are drawn, give: 6 mL/kg to maximum of 360 mL; the beverage is to be consumed within 5 minutes.     sennosides (SENOKOT) 8.6 MG tablet Take 2 tablets by mouth 2 times daily     docusate (COLACE) 50 MG/5ML liquid Take 100 mg by mouth 2 times daily as needed for constipation     Injection Device for insulin (NOVOPEN JR, HUNTER,) MODESTA 1 Device Inject 1 unit as needed for blood glucose above 140 mg/dL.     loratadine (CLARITIN) 10 MG tablet Take 10 mg by mouth daily     insulin pen needle (BD MAHESH U/F) 32G X 4 MM Use up to 3 times daily as needed for insulin dosing     blood glucose monitoring (BILL CONTOUR NEXT) test strip Check BS 4-6 times a day     gabapentin (NEURONTIN) 100 MG capsule Take 2 capsules (200 mg) by mouth 3 times daily     polyethylene glycol (MIRALAX/GLYCOLAX) packet Take 17 g by mouth daily     multivitamin CF formula (AQUADEKS) LIQD liquid Take 2.5 mLs by mouth daily     modafinil (PROVIGIL) 200 MG tablet Take 2 tablets by mouth Once a Day     NOVOLOG PENFILL SOLN 100 UNIT/ML Take as directed     glucose 40 % GEL Take 15-30 g by mouth every 15 minutes as needed.     Sharps Container MISC For insulin needles     [DISCONTINUED] levothyroxine (SYNTHROID/LEVOTHROID) 75 MCG tablet Take 1 tablet (75 mcg) by mouth daily     No current facility-administered medications for this visit.        The [place] Prescription Monitoring Program has been reviewed and there are no concerns about diversionary activity for controlled substances  "at this time.      I was able to review most recent Primary Care Provider, specialty provider, and therapy visit notes that I have access to.     Lynn MILADY Thompson reports mood has been: \"***\"  Anxiety has been: \"***\"  Sleep has been: \"***\"  PHQ9 and GAD7 scores were reviewed today.   Medication side effects: {[facility]:760290}  Current stressors include: Occupational Difficulties- Has been in contact with her employer.   Coping mechanisms and supports include: Exercise, Deep Breathing, Therapy, Family, Hobbies and Friends    Past Medical History:   Diagnosis Date     Abdominal pain, epigastric 11/05, ongoing; goes to pain clinic    probable recurrent spasm sphincter of Oddi causing biliary colic pains      Benign paroxysmal positional vertigo     occ.      Calculus of kidney 5/05    x1 on L side passed, several stones.  Has been tested for oxalate.     Chronic abdominal pain 7/17/2013     Chronic pain     chronic pancreatitis     Chronic pancreatitis (H) 7/17/2013     Depressive disorder      Depressive disorder, not elsewhere classified     also occ panic spells     Diabetes (H)     post pancreatectomy     Dyspepsia and other specified disorders of function of stomach 6/99    H. 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     Pyelonephritis, unspecified 5/04, 10/8    L side     Regional enteritis of unspecified site 1987    inacive/remission     Sleep apnea     uses Cpap     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    [place] [facility] [place]     Spasm of sphincter of Oddi     surgical + " endoscopic stenting of pancreatic [place] [facility] [place]     Thyroid nodule 11/1/2016     Ureteral calculus 10/2/2012     Vaccination not carried out       has a past medical history of Abdominal pain, epigastric (11/05, ongoing; goes to pain clinic); Benign paroxysmal positional vertigo; Calculus of kidney (5/05); Chronic abdominal pain (7/17/2013); Chronic pain; Chronic pancreatitis (H) (7/17/2013); Depressive disorder; Depressive disorder, not elsewhere classified; Diabetes (H); Dyspepsia and other specified disorders of function of stomach (6/99); Headache (12/28/2004); Headache (12/28/2004); Headache (12/28/2004); Headache(784.0); Hypertension (2/22/16); Iron deficiency anemia secondary to inadequate dietary iron intake (11/03); Obesity, unspecified; Other chronic pain; Pancreatic disease; Post-pancreatectomy diabetes (H) (5/17/2013); Pyelonephritis, unspecified (5/04, 10/8); Regional enteritis of unspecified site (1987); Sleep apnea; Spasm of sphincter of Oddi (9/02); Spasm of sphincter of Oddi; Thyroid nodule (11/1/2016); Ureteral calculus (10/2/2012); and Vaccination not carried out. She also has no past medical history of Arthritis; Cerebral infarction (H); Chronic infection; Congestive heart failure, unspecified; COPD (chronic obstructive pulmonary disease) (H); Heart disease; History of blood transfusion; Malignant hyperthermia; or Uncomplicated asthma.    Social History:  Current Living situation:  [place], [place] with Spouse/Partner and daughter and pets. Feels safe at home.  Employment: [employer] is still pending and if approved, will go until February 2, 2017. She has been trying to contact her workplace about other part time options. Her position has been filled. She needs a release to return back to work before applying for other options through her workplace.   Current use of drugs or alcohol: Denies    Tobacco use: No  Recovery Programming Involvement: None    Vital Signs:   /64 (BP Location: Right  "arm, Patient Position: Chair, Cuff Size: Adult Regular)  Pulse 76  Temp 97.9  F (36.6  C) (Oral)  Ht 5' 4\" (1.626 m)  Wt 145 lb (65.8 kg)  LMP 12/19/2013  SpO2 98%  BMI 24.89 kg/m2    Labs:  Most recent laboratory results reviewed and the pertinent results include:   Orders Only on 03/06/2017   Component Date Value Ref Range Status     TSH 03/06/2017 4.05* 0.40 - 4.00 mU/L Final     Hepatitis C Antibody 03/06/2017   NR Final                    Value:Nonreactive   Assay performance characteristics have not been established for newborns,   infants, and children       T4 Free 03/06/2017 0.95  0.76 - 1.46 ng/dL Final       Review of Systems:  10 systems (general, cardiovascular, respiratory, eyes, ENT, endocrine, GI, , M/S, neurological) were reviewed. Most pertinent finding(s) is/are: ***chronic pain . The remaining systems are all unremarkable.    Mental Status Examination:  Appearance:  awake, alert, appeared as age stated, adequate grooming, alert, cooperative and no distress  Attitude:  cooperative  Eye Contact:  good, wears glasses  Gait and Station: Normal  Mood:  \"better\"  Affect:  mood congruent, tearful at times  Speech:  clear, coherent  Psychomotor Behavior:  no evidence of tardive dyskinesia, dystonia, or tics  Thought Process:  logical, linear and goal oriented  Associations:  no loose associations  Thought Content:  no evidence of suicidal ideation or homicidal ideation, no evidence of psychotic thought, no auditory hallucinations present and no visual hallucinations present  Insight:  good  Judgment:  intact  Oriented to:  time, person, and place  Attention Span and Concentration:  fair  Recent and Remote Memory:  intact Not formally assessed. no amnesia  Fund of Knowledge: appropriate     Suicide Risk Assessment:  Today Lynn Thompson reports ongoing stress related to psychosocial stressors, but has identified more coping skills. In addition, there are notable risk factors for self-harm, " including age and anxiety. However, risk is mitigated by family, sobriety, absence of past attempts, ability to volunteer a safety plan, history of seeking help when needed, future oriented, no access to firearms or weapons, denies suicidal intent or plan, no family history of suicide and denies homicidal ideation, intent, or plan. Therefore, based on all available evidence including the factors cited above, Lynn Thompson does not appear to be at imminent risk for self-harm, does not meet criteria for a 72-hr hold, and therefore remains appropriate for ongoing outpatient level of care.  A thorough assessment of risk factors related to suicide and self-harm have been reviewed and are noted above. The patient convincingly denies suicidality on several occasions. There was no deceit detected, and the patient presented in a manner that was believable.      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)     Medical comorbidities include:   Patient Active Problem List    Diagnosis Date Noted     Health Care Home 05/03/2011     Priority: High     EMERGENCY CARE PLAN  Presenting Problem Signs and Symptoms Treatment Plan    Questions or conerns during clinic hours    I will call the clinic directly     Questions or conerns outside clinic hours    I will call the 24 hour nurse line at [phone]    Patient needs to schedule an appointment    I will call the 24 hour scheduling team at [phone] or clinic directly    Same day treatment     I will call the clinic first, nurse line if after hours, urgent care and express care if needed                            DX V65.8 REPLACED WITH [id number] [facility] HOME (04/08/2013)       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     Priority: Medium     Decreased libido  12/14/2016     Priority: Medium     Acquired hypothyroidism 11/03/2016     Priority: Medium     Thyroid nodule 11/01/2016     Priority: Medium     Dysthymia 03/01/2016     Priority: Medium     Anxiety 03/01/2016     Priority: Medium     S/P exploratory laparotomy 12/29/2015     Priority: Medium     BLU (obstructive sleep apnea) 12/23/2015     Priority: Medium     Asplenia 10/24/2014     Priority: Medium     Acquired absence of pancreas 05/17/2013     Priority: Medium     Exocrine pancreatic insufficiency 05/17/2013     Priority: Medium     Chronic pain syndrome 02/23/2013     Priority: Medium     Gastric bypass status for obesity 10/26/2010     Priority: Medium     Iron deficiency anemia secondary to inadequate dietary iron intake 12/28/2004     Priority: Medium     relates to gastric bypass         Psychosocial & Contextual Factors: Occupational Difficulties, Parent/Child Relationship Difficulties, Financial Difficulties, Relationship Difficulties, Phase of Life Difficulties and Medical Comorbidites    Assessment:  Lynn Thompson reports ***. Medication side effects and alternatives were reviewed. Health promotion activities recommended and reviewed today. Continue multiple day dosing of medications due to gastric bypass surgery history.       Continue Buspar (buspirone) 30 mg by mouth in AM and 30 mg in PM.    Continue Cymbalta (duloxetine) to 30 mg in AM and 30 mg in PM.     Continue trazodone 50 mg by mouth at bedtime for sleep.    Continue Neurontin (gabapentin) 200 mg 3 times per day per pain specialist.    Continue Provigil (modafinil) 400 mg in AM per sleep specialist.    Continue Ativan (lorazepam) as needed for panic per primary care provider.     Continue Suboxone     Treatment Plan:    *** Continue    *** Discontinue    *** Start    *** per primary care provider.     Continue all other medications as reviewed per electronic medical record today.     All questions addressed. Education and counseling  completed regarding risks and benefits of medications and psychotherapy options.    Safety plan was reviewed. To the Emergency Department as needed or call after hours crisis line at [phone] or [phone].     To schedule individual or family therapy, call [facility] at [phone]. ***    Continue individual/group therapy as planned. ***    Schedule an appointment with me in *** or sooner as needed. Call [facility] at [phone] to schedule.    Follow up with primary care provider as planned or for acute medical concerns.    Call the psychiatric nurse line with medication questions or concerns at [phone].    My Practice Policy was reviewed and signed: YES     MyChart may be used to communicate with your provider, but this is not intended to be used for emergencies.    Administrative Billing:   Time spent with patient was 30 minutes and greater than 50% of time or 20 minutes was spent in counseling and coordination of care.    Patient Status:  {FLACOSTATUS:199744}    Signed:   Domonique Glynn, PhD, APRN, CNP   Psychiatry

## 2017-05-25 NOTE — TELEPHONE ENCOUNTER
Routing refill request to provider for review/approval because:  Labs not current:  TSH.  Tried contacting patient via phone and mychart but no response.  PT has appt today with psyc and placed in appt notes to have pt call clinic.    Neel Zarate RN, BSN

## 2017-05-26 ASSESSMENT — PATIENT HEALTH QUESTIONNAIRE - PHQ9: SUM OF ALL RESPONSES TO PHQ QUESTIONS 1-9: 5

## 2017-05-26 ASSESSMENT — ANXIETY QUESTIONNAIRES: GAD7 TOTAL SCORE: 4

## 2017-05-30 ENCOUNTER — OFFICE VISIT (OUTPATIENT)
Dept: FAMILY MEDICINE | Facility: CLINIC | Age: 52
End: 2017-05-30
Payer: COMMERCIAL

## 2017-05-30 VITALS
BODY MASS INDEX: 25.1 KG/M2 | DIASTOLIC BLOOD PRESSURE: 58 MMHG | SYSTOLIC BLOOD PRESSURE: 116 MMHG | WEIGHT: 146.2 LBS | RESPIRATION RATE: 20 BRPM | TEMPERATURE: 98.8 F | HEART RATE: 60 BPM

## 2017-05-30 DIAGNOSIS — E13.9 POST-PANCREATECTOMY DIABETES (H): ICD-10-CM

## 2017-05-30 DIAGNOSIS — Z90.410 POST-PANCREATECTOMY DIABETES (H): ICD-10-CM

## 2017-05-30 DIAGNOSIS — N30.01 ACUTE CYSTITIS WITH HEMATURIA: Primary | ICD-10-CM

## 2017-05-30 DIAGNOSIS — E03.9 ACQUIRED HYPOTHYROIDISM: ICD-10-CM

## 2017-05-30 DIAGNOSIS — R30.0 DYSURIA: ICD-10-CM

## 2017-05-30 DIAGNOSIS — E89.1 POST-PANCREATECTOMY DIABETES (H): ICD-10-CM

## 2017-05-30 LAB
ALBUMIN UR-MCNC: 30 MG/DL
APPEARANCE UR: ABNORMAL
BACTERIA #/AREA URNS HPF: ABNORMAL /HPF
BILIRUB UR QL STRIP: NEGATIVE
COLOR UR AUTO: YELLOW
GLUCOSE UR STRIP-MCNC: 100 MG/DL
HGB UR QL STRIP: NEGATIVE
KETONES UR STRIP-MCNC: NEGATIVE MG/DL
LEUKOCYTE ESTERASE UR QL STRIP: ABNORMAL
NITRATE UR QL: POSITIVE
NON-SQ EPI CELLS #/AREA URNS LPF: ABNORMAL /LPF
PH UR STRIP: 6 PH (ref 5–7)
RBC #/AREA URNS AUTO: ABNORMAL /HPF (ref 0–2)
SP GR UR STRIP: 1.02 (ref 1–1.03)
TSH SERPL DL<=0.005 MIU/L-ACNC: 3.58 MU/L (ref 0.4–4)
URN SPEC COLLECT METH UR: ABNORMAL
UROBILINOGEN UR STRIP-ACNC: 0.2 EU/DL (ref 0.2–1)
WBC #/AREA URNS AUTO: >100 /HPF (ref 0–2)

## 2017-05-30 PROCEDURE — 87186 SC STD MICRODIL/AGAR DIL: CPT | Performed by: FAMILY MEDICINE

## 2017-05-30 PROCEDURE — 87086 URINE CULTURE/COLONY COUNT: CPT | Performed by: FAMILY MEDICINE

## 2017-05-30 PROCEDURE — 84443 ASSAY THYROID STIM HORMONE: CPT | Performed by: FAMILY MEDICINE

## 2017-05-30 PROCEDURE — 99214 OFFICE O/P EST MOD 30 MIN: CPT | Performed by: FAMILY MEDICINE

## 2017-05-30 PROCEDURE — 81001 URINALYSIS AUTO W/SCOPE: CPT | Performed by: FAMILY MEDICINE

## 2017-05-30 PROCEDURE — 36415 COLL VENOUS BLD VENIPUNCTURE: CPT | Performed by: FAMILY MEDICINE

## 2017-05-30 PROCEDURE — 99207 C FOOT EXAM  NO CHARGE: CPT | Mod: 25 | Performed by: FAMILY MEDICINE

## 2017-05-30 RX ORDER — FLUCONAZOLE 150 MG/1
TABLET ORAL
Qty: 2 TABLET | Refills: 0 | Status: ON HOLD | OUTPATIENT
Start: 2017-05-30 | End: 2017-08-25

## 2017-05-30 RX ORDER — NITROFURANTOIN 25; 75 MG/1; MG/1
100 CAPSULE ORAL 2 TIMES DAILY
Qty: 14 CAPSULE | Refills: 0 | Status: ON HOLD | OUTPATIENT
Start: 2017-05-30 | End: 2017-08-25

## 2017-05-30 NOTE — MR AVS SNAPSHOT
After Visit Summary   5/30/2017    Lynn Thompson    MRN: [id number]           Patient Information     Date Of Birth          1965        Visit Information        Provider Department      5/30/2017 8:00 AM Maryana Brooks MD [facility]        Today's Diagnoses     Acute cystitis with hematuria    -  1    Dysuria        Acquired hypothyroidism           Follow-ups after your visit        Who to contact     If you have questions or need follow up information about today's clinic visit or your schedule please contact [facility] directly at [phone].  Normal or non-critical lab and imaging results will be communicated to you by Damage Houndshart, letter or phone within 4 business days after the clinic has received the results. If you do not hear from us within 7 days, please contact the clinic through HASHt or phone. If you have a critical or abnormal lab result, we will notify you by phone as soon as possible.  Submit refill requests through exoro system or call your pharmacy and they will forward the refill request to us. Please allow 3 business days for your refill to be completed.          Additional Information About Your Visit        MyChart Information     exoro system gives you secure access to your electronic health record. If you see a primary care provider, you can also send messages to your care team and make appointments. If you have questions, please call your primary care clinic.  If you do not have a primary care provider, please call [phone] and they will assist you.        Care EveryWhere ID     This is your Care EveryWhere ID. This could be used by other organizations to access your [facility] medical records  [id number]        Your Vitals Were     Pulse Temperature Respirations Last Period Breastfeeding? BMI (Body Mass Index)    60 98.8  F (37.1  C) (Oral) 20 12/19/2013 No 25.1 kg/m2       Blood Pressure from Last 3 Encounters:   05/30/17 116/58    05/25/17 112/64   04/11/17 118/76    Weight from Last 3 Encounters:   05/30/17 146 lb 3.2 oz (66.3 kg)   05/25/17 145 lb (65.8 kg)   04/11/17 145 lb 9.6 oz (66 kg)              We Performed the Following     **TSH with free T4 reflex FUTURE 2mo     *UA reflex to Microscopic and Culture ([place] and [facility] (except [place] and [place])     Urine Culture Aerobic Bacterial     Urine Microscopic          Today's Medication Changes          These changes are accurate as of: 5/30/17  8:59 AM.  If you have any questions, ask your nurse or doctor.               Start taking these medicines.        Dose/Directions    fluconazole 150 MG tablet   Commonly known as:  DIFLUCAN   Used for:  Acute cystitis with hematuria   Started by:  Maryana Brooks MD        Take 1 tab with onset of symptoms, then repeat dose 3 days later   Quantity:  2 tablet   Refills:  0       nitrofurantoin (macrocrystal-monohydrate) 100 MG capsule   Commonly known as:  MACROBID   Used for:  Acute cystitis with hematuria   Started by:  Maryana Brooks MD        Dose:  100 mg   Take 1 capsule (100 mg) by mouth 2 times daily   Quantity:  14 capsule   Refills:  0         These medicines have changed or have updated prescriptions.        Dose/Directions    gabapentin 100 MG capsule   Commonly known as:  NEURONTIN   This may have changed:  how much to take   Used for:  Chronic pancreatitis (H)        Dose:  200 mg   Take 2 capsules (200 mg) by mouth 3 times daily   Quantity:  180 capsule   Refills:  3            Where to get your medicines      These medications were sent to [id number] - [place], [place] - [address]  [address], [facility] [address]     Phone:  [phone]     fluconazole 150 MG tablet    nitrofurantoin (macrocrystal-monohydrate) 100 MG capsule                Primary Care Provider Office Phone # Fax #    Maryana Brooks [phone]       [facility] [address]  [facility] [id number]        Thank you!     Thank you for choosing [facility]  for your care. Our goal is always to provide you with excellent care. Hearing back from our patients is one way we can continue to improve our services. Please take a few minutes to complete the written survey that you may receive in the mail after your visit with us. Thank you!             Your Updated Medication List - Protect others around you: Learn how to safely use, store and throw away your medicines at www.disposemymeds.org.          This list is accurate as of: 5/30/17  8:59 AM.  Always use your most recent med list.                   Brand Name Dispense Instructions for use    amylase-lipase-protease 98353 UNITS Tabs tablet    VIOKACE    600 tablet    4-5 aps with meals and 1-2 with snacks       blood glucose lancing device     1 each    Use to test blood sugars 6 times daily or as directed.       blood glucose monitoring test strip    BILL CONTOUR NEXT    2 Box    Check BS 4-6 times a day       BOOST HIGH PROTEIN Liqd      After above baseline labs are drawn, give: 6 mL/kg to maximum of 360 mL; the beverage is to be consumed within 5 minutes.       buprenorphine HCl-naloxone HCl 2-0.5 MG per film    SUBOXONE     Place 1 Film under the tongue daily Pt takes 1/2 bid       busPIRone 15 MG tablet    BUSPAR    120 tablet    Increased dose. 30 mg two times per day.       docusate 50 MG/5ML liquid    COLACE     Take 100 mg by mouth 2 times daily as needed for constipation       DULoxetine 30 MG EC capsule    CYMBALTA    180 capsule    Take 1 capsule (30 mg) by mouth 2 times daily Fill at patient request.       estradiol 0.1 MG/GM cream    ESTRACE VAGINAL    42.5 g    Place 2 g vaginally twice a week       estrogen (conjugated)-medroxyPROGESTERone 0.3-1.5 MG per tablet    PREMPRO    90 tablet    Take 1 tablet by mouth daily       fluconazole 150 MG tablet    DIFLUCAN    2 tablet    Take 1 tab with onset of symptoms,  then repeat dose 3 days later       gabapentin 100 MG capsule    NEURONTIN    180 capsule    Take 2 capsules (200 mg) by mouth 3 times daily       glucose 40 % Gel gel     1 Tube    Take 15-30 g by mouth every 15 minutes as needed.       insulin pen needle 32G X 4 MM    BD MAHESH U/F    100 each    Use up to 3 times daily as needed for insulin dosing       levothyroxine 75 MCG tablet    SYNTHROID/LEVOTHROID    10 tablet    Take 1 tablet (75 mcg) by mouth daily       loratadine 10 MG tablet    CLARITIN     Take 10 mg by mouth daily       modafinil 200 MG tablet    PROVIGIL     Take 2 tablets by mouth Once a Day       multivitamin CF formula Liqd liquid     75 mL    Take 2.5 mLs by mouth daily       nitrofurantoin (macrocrystal-monohydrate) 100 MG capsule    MACROBID    14 capsule    Take 1 capsule (100 mg) by mouth 2 times daily       NovoLOG penFILL 100 UNITS/ML injection   Generic drug:  insulin aspart     3 Month    Take as directed       ELKE FRIEND) Genny   Generic drug:  Injection Device for insulin      1 Device Inject 1 unit as needed for blood glucose above 140 mg/dL.       omeprazole 40 MG capsule    priLOSEC    180 capsule    Take 1 capsule (40 mg) by mouth 2 times daily Take 30-60 minutes before a meal.       polyethylene glycol Packet    MIRALAX/GLYCOLAX    7 packet    Take 17 g by mouth daily       sennosides 8.6 MG tablet    SENOKOT    120 tablet    Take 2 tablets by mouth 2 times daily       Sharps Container Misc     1 each    For insulin needles       traZODone 50 MG tablet    DESYREL    90 tablet    Take 1 tablet (50 mg) by mouth At Bedtime Fill at patient request.

## 2017-05-30 NOTE — NURSING NOTE
"Chief Complaint   Patient presents with     UTI     x5 days       Initial /58 (BP Location: Right arm, Patient Position: Chair, Cuff Size: Adult Regular)  Pulse 60  Temp 98.8  F (37.1  C) (Oral)  Resp 20  Wt 146 lb 3.2 oz (66.3 kg)  LMP 12/19/2013  Breastfeeding? No  BMI 25.1 kg/m2 Estimated body mass index is 25.1 kg/(m^2) as calculated from the following:    Height as of 5/25/17: 5' 4\" (1.626 m).    Weight as of this encounter: 146 lb 3.2 oz (66.3 kg).  Medication Reconciliation: incomplete   Meliza Ba CMA (AAMA)      "

## 2017-05-30 NOTE — PROGRESS NOTES
SUBJECTIVE:                                                    Lynn Thompson is a 52 year old female who presents to clinic today for the following health issues:    URINARY TRACT SYMPTOMS      Duration: x5 days    Description  dysuria, urgency and retention    Intensity:  8/10    Accompanying signs and symptoms:  Fever/chills: no   Flank pain YES  Nausea and vomiting: no   Vaginal symptoms: none  Abdominal/Pelvic Pain: no     History  History of frequent UTI's: YES  History of kidney stones: YES  Sexually Active: YES  Possibility of pregnancy: No    Precipitating or alleviating factors: None    Therapies tried and outcome: pyridium  and increase fluid intake   Outcome: Azzo has helped       Diabetes Follow-up    Patient is checking blood sugars: three times daily.   Blood sugar testing frequency justification: Adjustment of medication(s)  Results are as follows:         Rarely above 140 - if above 140, gives 1 unit novolog    Diabetic concerns: None     Symptoms of hypoglycemia (low blood sugar): none     Paresthesias (numbness or burning in feet) or sores: No     Date of last diabetic eye exam: within past year       Would like us to follow diabetes from now on. Stable, although hasn't been eating as well since she has been unemployed.       Hypothyroidism Follow-up      Since last visit, patient describes the following symptoms: Weight stable, no hair loss, no skin changes, no constipation, no loose stools     Recent dose change up to 75 mcg daily due to TSH above 4.       Problem list and histories reviewed & adjusted, as indicated.  Additional history: as documented    Patient Active Problem List   Diagnosis     Iron deficiency anemia secondary to inadequate dietary iron intake     Gastric bypass status for obesity     Health Care Home     Chronic pain syndrome     Acquired absence of pancreas     Exocrine pancreatic insufficiency     Asplenia     BLU (obstructive sleep apnea)     S/P exploratory  laparotomy     Dysthymia     Anxiety     Thyroid nodule     Acquired hypothyroidism     Post-pancreatectomy diabetes (H)     Past Surgical History:   Procedure Laterality Date     APPENDECTOMY       C NONSPECIFIC PROCEDURE      R bunion     C NONSPECIFIC PROCEDURE      T & A     C NONSPECIFIC PROCEDURE      partial ileum resection     C NONSPECIFIC PROCEDURE      R ovarian cyst     C NONSPECIFIC PROCEDURE           C NONSPECIFIC PROCEDURE      GALL BLADDER     C NONSPECIFIC PROCEDURE      CBD stent; Dr. Fernandez MAY NONSPECIFIC PROCEDURE   &     colonoscopy     C NONSPECIFIC PROCEDURE      Gastric bypass     CHOLECYSTECTOMY       COLONOSCOPY       COMBINED CYSTOSCOPY, RETROGRADES, URETEROSCOPY, LASER HOLMIUM LITHOTRIPSY URETER(S), INSERT STENT Right 3/23/2015    Procedure: COMBINED CYSTOSCOPY, RETROGRADES, URETEROSCOPY, LASER HOLMIUM LITHOTRIPSY URETER(S), INSERT STENT;  Surgeon: Kennedi Aldana MD;  Location: [facility]     COMBINED CYSTOSCOPY, RETROGRADES, URETEROSCOPY, LASER HOLMIUM LITHOTRIPSY URETER(S), INSERT STENT Right 2015    Procedure: COMBINED CYSTOSCOPY, RETROGRADES, URETEROSCOPY, LASER HOLMIUM LITHOTRIPSY URETER(S), INSERT STENT;  Surgeon: Kennedi Aldana MD;  Location: [facility] OR     COSMETIC SURGERY  2002    Tummy tuck     CYSTOSCOPY, RETROGRADES, INSERT STENT URETER(S), COMBINED  10/2/2012    Procedure: COMBINED CYSTOSCOPY, RETROGRADES, INSERT STENT URETER(S);  COMBINED CYSTOSCOPY,  , INSERT LEFT STENT URETER;  Surgeon: Johny Baez MD;  Location: [facility] OR     ENT SURGERY       EXTRACORPOREAL SHOCK WAVE LITHOTRIPSY (ESWL)  10/16/2012    Procedure: EXTRACORPOREAL SHOCK WAVE LITHOTRIPSY (ESWL);  left EXTRACORPOREAL SHOCK WAVE LITHOTRIPSY (ESWL) ;  Surgeon: Johny Baez MD;  Location: [facility] OR     GI SURGERY  2015    Small bowel obstruction     HC LAP,LYSIS OF ADHESIONS       HERNIA REPAIR  2015     LAPAROSCOPIC LYSIS  ADHESIONS N/A 2015    Procedure: LAPAROSCOPIC LYSIS ADHESIONS;  Surgeon: Aaron Early MD;  [facility]     LAPAROSCOPIC LYSIS ADHESIONS N/A 2015    Procedure: LAPAROSCOPIC LYSIS ADHESIONS;  Surgeon: Aaron Early MD;  [facility]     PANCREATECTOMY, TRANSPLANT AUTO ISLET CELL, COMBINED  5/10/2013    Procedure: COMBINED PANCREATECTOMY, TRANSPLANT AUTO ISLET CELL;  Pancreatectomy, Auto Islet Cell Transplant   hernia repair, jejunostomy tube and liver biopsies with Anesthesia General with block;  Surgeon: Aaron Early MD;  [facility] OR     TRANSPLANT  5/10/13       Social History   Substance Use Topics     Smoking status: Never Smoker     Smokeless tobacco: Never Used     Alcohol use No     Family History   Problem Relation Age of Onset     Family History Negative Mother      Respiratory Father      COPD;  at 69     Genitourinary Problems Father      kidney stones     Substance Abuse Father      Depression Father      Asthma Father      HEART DISEASE Paternal Grandfather      M.I.     Coronary Artery Disease Paternal Grandfather      Hyperlipidemia Paternal Grandfather      Genitourinary Problems Brother      multiple brothers with kidney stones     GASTROINTESTINAL DISEASE Maternal Grandmother      undiagnosed 'gut' issues     Coronary Artery Disease Maternal Grandfather      Hyperlipidemia Maternal Grandfather      CEREBROVASCULAR DISEASE Paternal Grandmother      At the age of 103     Anxiety Disorder Paternal Grandmother      OSTEOPOROSIS Paternal Grandmother      Anxiety Disorder Son      Anxiety Disorder Daughter      Asthma Daughter            Reviewed and updated as needed this visit by clinical staff  Tobacco  Allergies  Meds  Med Hx  Surg Hx  Fam Hx  Soc Hx      Reviewed and updated as needed this visit by Provider         ROS:  Constitutional, HEENT, cardiovascular, pulmonary, gi and gu systems are negative, except as otherwise noted.    OBJECTIVE:                                                     /58 (BP Location: Right arm, Patient Position: Chair, Cuff Size: Adult Regular)  Pulse 60  Temp 98.8  F (37.1  C) (Oral)  Resp 20  Wt 146 lb 3.2 oz (66.3 kg)  LMP 12/19/2013  Breastfeeding? No  BMI 25.1 kg/m2  Body mass index is 25.1 kg/(m^2).  GENERAL: healthy, alert and no distress  NECK: thyroid normal to palpation  RESP: lungs clear to auscultation - no rales, rhonchi or wheezes  CV: regular rate and rhythm, normal S1 S2, no S3 or S4, no murmur, click or rub, no peripheral edema and peripheral pulses strong  ABDOMEN: suprapubic tenderness to palpation  Diabetic foot exam: normal DP and PT pulses, no trophic changes or ulcerative lesions and normal sensory exam    Diagnostic Test Results:  Results for orders placed or performed in visit on 05/30/17 (from the past 24 hour(s))   *UA reflex to Microscopic and Culture ([place] and [facility] (except [place] and [place])   Result Value Ref Range    Color Urine Yellow     Appearance Urine Slightly Cloudy     Glucose Urine 100 (A) NEG mg/dL    Bilirubin Urine Negative NEG    Ketones Urine Negative NEG mg/dL    Specific Gravity Urine 1.025 1.003 - 1.035    Blood Urine Negative NEG    pH Urine 6.0 5.0 - 7.0 pH    Protein Albumin Urine 30 (A) NEG mg/dL    Urobilinogen Urine 0.2 0.2 - 1.0 EU/dL    Nitrite Urine Positive (A) NEG    Leukocyte Esterase Urine Small (A) NEG    Source Midstream Urine    Urine Microscopic   Result Value Ref Range    WBC Urine >100 (A) 0 - 2 /HPF    RBC Urine O - 2 0 - 2 /HPF    Squamous Epithelial /LPF Urine Few FEW /LPF    Bacteria Urine Many (A) NEG /HPF     *Note: Due to a large number of results and/or encounters for the requested time period, some results have not been displayed. A complete set of results can be found in Results Review.        ASSESSMENT/PLAN:                                                      1. Acute cystitis with hematuria - increase fluid intake, daily yogurt  -  nitrofurantoin, macrocrystal-monohydrate, (MACROBID) 100 MG capsule; Take 1 capsule (100 mg) by mouth 2 times daily  Dispense: 14 capsule; Refill: 0  - fluconazole (DIFLUCAN) 150 MG tablet; Take 1 tab with onset of symptoms, then repeat dose 3 days later  Dispense: 2 tablet; Refill: 0    2. Dysuria  - *UA reflex to Microscopic and Culture ([place] and [facility] (except [place] and [place])  - Urine Microscopic  - Urine Culture Aerobic Bacterial    3. Acquired hypothyroidism - needs recheck following dose adjustment  - **TSH with free T4 reflex FUTURE 2mo    4. Post-pancreatectomy diabetes (H) - doing well, last A1c 7.2%. Rarely using novolog as sugars are typically less than 140.       Maryana Brooks MD  [facility]

## 2017-06-01 LAB
BACTERIA SPEC CULT: ABNORMAL
MICRO REPORT STATUS: ABNORMAL
MICROORGANISM SPEC CULT: ABNORMAL
SPECIMEN SOURCE: ABNORMAL

## 2017-06-01 RX ORDER — LEVOTHYROXINE SODIUM 75 UG/1
75 TABLET ORAL DAILY
Qty: 90 TABLET | Refills: 1 | Status: SHIPPED | OUTPATIENT
Start: 2017-06-01 | End: 2017-12-31

## 2017-06-06 ENCOUNTER — TELEPHONE (OUTPATIENT)
Dept: TRANSPLANT | Facility: CLINIC | Age: 52
End: 2017-06-06

## 2017-06-06 DIAGNOSIS — Z90.410 POST-PANCREATECTOMY DIABETES (H): Primary | ICD-10-CM

## 2017-06-06 DIAGNOSIS — Z90.410 ACQUIRED ABSENCE OF PANCREAS: ICD-10-CM

## 2017-06-06 DIAGNOSIS — E89.1 POST-PANCREATECTOMY DIABETES (H): Primary | ICD-10-CM

## 2017-06-06 DIAGNOSIS — E13.9 POST-PANCREATECTOMY DIABETES (H): Primary | ICD-10-CM

## 2017-06-06 NOTE — TELEPHONE ENCOUNTER
Received call from Lynn Thompson, haoy, crying.  Reports BGs are 170s in the AM past few mornings and not going below 140 today.  She says this is different for her, that they are usually good.  Reviewed what she is taking for insulin now-- reports no insulin use except for PRN Novolog.    I recommended on phone we restart a low dose of Lantus, at least temporarily.   In reviewing her labs after this call, noted that last two A1c values were 7.2% -- indicates insulin need so I think this Lantus is not likely to be only a temporary need.      Plan:  1)  Lantus 4 units daily to start.  Rx sent  2)  Follow up with me with numbers.  3)  She is at her yearly anniversary and due for yearly labs-- since she is local I will ask our nurse coordinator if she can arrange labs and then follow up in clinic with me for diabetes management.

## 2017-06-07 ENCOUNTER — TELEPHONE (OUTPATIENT)
Dept: TRANSPLANT | Facility: CLINIC | Age: 52
End: 2017-06-07

## 2017-06-07 NOTE — TELEPHONE ENCOUNTER
Spoke to Lynn. She picked up her Lantus last night and took 4 units. FBS this am 81. She will come in for MMT Monday nest week and Thursday to see Dr Robles

## 2017-06-12 ENCOUNTER — ALLIED HEALTH/NURSE VISIT (OUTPATIENT)
Dept: TRANSPLANT | Facility: CLINIC | Age: 52
End: 2017-06-12
Attending: SURGERY
Payer: COMMERCIAL

## 2017-06-12 VITALS — HEIGHT: 64 IN | WEIGHT: 144.2 LBS | BODY MASS INDEX: 24.62 KG/M2

## 2017-06-12 DIAGNOSIS — Z90.410 POST-PANCREATECTOMY DIABETES (H): Primary | ICD-10-CM

## 2017-06-12 DIAGNOSIS — K86.89 PANCREATIC INSUFFICIENCY: ICD-10-CM

## 2017-06-12 DIAGNOSIS — E13.9 POST-PANCREATECTOMY DIABETES (H): Primary | ICD-10-CM

## 2017-06-12 DIAGNOSIS — Z90.410 POST-PANCREATECTOMY DIABETES (H): ICD-10-CM

## 2017-06-12 DIAGNOSIS — E89.1 POST-PANCREATECTOMY DIABETES (H): ICD-10-CM

## 2017-06-12 DIAGNOSIS — E13.9 POST-PANCREATECTOMY DIABETES (H): ICD-10-CM

## 2017-06-12 DIAGNOSIS — E89.1 POST-PANCREATECTOMY DIABETES (H): Primary | ICD-10-CM

## 2017-06-12 LAB
ALBUMIN SERPL-MCNC: 3.6 G/DL (ref 3.4–5)
ALP SERPL-CCNC: 169 U/L (ref 40–150)
ALT SERPL W P-5'-P-CCNC: 58 U/L (ref 0–50)
ANION GAP SERPL CALCULATED.3IONS-SCNC: 6 MMOL/L (ref 3–14)
AST SERPL W P-5'-P-CCNC: 46 U/L (ref 0–45)
BASOPHILS # BLD AUTO: 0.1 10E9/L (ref 0–0.2)
BASOPHILS NFR BLD AUTO: 1.2 %
BILIRUB SERPL-MCNC: 0.4 MG/DL (ref 0.2–1.3)
BUN SERPL-MCNC: 15 MG/DL (ref 7–30)
C PEPTIDE SERPL-MCNC: 0.1 NG/ML (ref 0.9–6.9)
C PEPTIDE SERPL-MCNC: 1.3 NG/ML (ref 0.9–6.9)
C PEPTIDE SERPL-MCNC: 1.6 NG/ML (ref 0.9–6.9)
CALCIUM SERPL-MCNC: 8.7 MG/DL (ref 8.5–10.1)
CHLORIDE SERPL-SCNC: 104 MMOL/L (ref 94–109)
CO2 SERPL-SCNC: 30 MMOL/L (ref 20–32)
CREAT SERPL-MCNC: 0.67 MG/DL (ref 0.52–1.04)
DIFFERENTIAL METHOD BLD: NORMAL
EOSINOPHIL # BLD AUTO: 0.2 10E9/L (ref 0–0.7)
EOSINOPHIL NFR BLD AUTO: 2.8 %
ERYTHROCYTE [DISTWIDTH] IN BLOOD BY AUTOMATED COUNT: 13.6 % (ref 10–15)
FERRITIN SERPL-MCNC: 274 NG/ML (ref 8–252)
GFR SERPL CREATININE-BSD FRML MDRD: ABNORMAL ML/MIN/1.7M2
GLUCOSE SERPL-MCNC: 153 MG/DL (ref 70–99)
GLUCOSE SERPL-MCNC: 263 MG/DL (ref 70–99)
GLUCOSE SERPL-MCNC: 74 MG/DL (ref 70–99)
HBA1C MFR BLD: 7 % (ref 4.3–6)
HCT VFR BLD AUTO: 39.9 % (ref 35–47)
HCT VFR BLD AUTO: 39.9 % (ref 35–47)
HGB BLD-MCNC: 12.6 G/DL (ref 11.7–15.7)
IMM GRANULOCYTES # BLD: 0 10E9/L (ref 0–0.4)
IMM GRANULOCYTES NFR BLD: 0.2 %
IRON SATN MFR SERPL: 52 % (ref 15–46)
IRON SERPL-MCNC: 127 UG/DL (ref 35–180)
LYMPHOCYTES # BLD AUTO: 2.2 10E9/L (ref 0.8–5.3)
LYMPHOCYTES NFR BLD AUTO: 37 %
MCH RBC QN AUTO: 30.2 PG (ref 26.5–33)
MCHC RBC AUTO-ENTMCNC: 31.6 G/DL (ref 31.5–36.5)
MCV RBC AUTO: 96 FL (ref 78–100)
MONOCYTES # BLD AUTO: 0.6 10E9/L (ref 0–1.3)
MONOCYTES NFR BLD AUTO: 10.3 %
NEUTROPHILS # BLD AUTO: 2.9 10E9/L (ref 1.6–8.3)
NEUTROPHILS NFR BLD AUTO: 48.5 %
NRBC # BLD AUTO: 0 10*3/UL
NRBC BLD AUTO-RTO: 0 /100
PLATELET # BLD AUTO: 338 10E9/L (ref 150–450)
POTASSIUM SERPL-SCNC: 3.8 MMOL/L (ref 3.4–5.3)
PREALB SERPL IA-MCNC: 23 MG/DL (ref 15–45)
PROT SERPL-MCNC: 7.2 G/DL (ref 6.8–8.8)
RBC # BLD AUTO: 4.17 10E12/L (ref 3.8–5.2)
SODIUM SERPL-SCNC: 140 MMOL/L (ref 133–144)
TIBC SERPL-MCNC: 244 UG/DL (ref 240–430)
VIT B12 SERPL-MCNC: 606 PG/ML (ref 193–986)
WBC # BLD AUTO: 6 10E9/L (ref 4–11)

## 2017-06-12 PROCEDURE — 82607 VITAMIN B-12: CPT | Performed by: PEDIATRICS

## 2017-06-12 PROCEDURE — 84134 ASSAY OF PREALBUMIN: CPT | Performed by: PEDIATRICS

## 2017-06-12 PROCEDURE — 83540 ASSAY OF IRON: CPT | Performed by: PEDIATRICS

## 2017-06-12 PROCEDURE — 80053 COMPREHEN METABOLIC PANEL: CPT | Performed by: PEDIATRICS

## 2017-06-12 PROCEDURE — 82306 VITAMIN D 25 HYDROXY: CPT | Performed by: PEDIATRICS

## 2017-06-12 PROCEDURE — 82728 ASSAY OF FERRITIN: CPT | Performed by: PEDIATRICS

## 2017-06-12 PROCEDURE — 83036 HEMOGLOBIN GLYCOSYLATED A1C: CPT | Performed by: PEDIATRICS

## 2017-06-12 PROCEDURE — 84590 ASSAY OF VITAMIN A: CPT | Performed by: PEDIATRICS

## 2017-06-12 PROCEDURE — 84681 ASSAY OF C-PEPTIDE: CPT | Performed by: PEDIATRICS

## 2017-06-12 PROCEDURE — 85025 COMPLETE CBC W/AUTO DIFF WBC: CPT | Performed by: PEDIATRICS

## 2017-06-12 PROCEDURE — 83550 IRON BINDING TEST: CPT | Performed by: PEDIATRICS

## 2017-06-12 PROCEDURE — 36415 COLL VENOUS BLD VENIPUNCTURE: CPT | Performed by: PEDIATRICS

## 2017-06-12 PROCEDURE — 84630 ASSAY OF ZINC: CPT | Performed by: PEDIATRICS

## 2017-06-12 PROCEDURE — 85014 HEMATOCRIT: CPT | Performed by: PEDIATRICS

## 2017-06-12 PROCEDURE — 82747 ASSAY OF FOLIC ACID RBC: CPT | Performed by: PEDIATRICS

## 2017-06-12 PROCEDURE — 82947 ASSAY GLUCOSE BLOOD QUANT: CPT | Performed by: PEDIATRICS

## 2017-06-12 PROCEDURE — 84446 ASSAY OF VITAMIN E: CPT | Performed by: PEDIATRICS

## 2017-06-12 NOTE — NURSING NOTE
Lynn Thompson arrived fasting for labs, vitals obtained, instructions provided for mixed meal test.  Patient stated understanding of the plan.  Fasting labs drawn, patient consumed 360 ml of BOOST per providers instruction.  Patient instructed to return to the lab at 1hr (09:35 AM) and 2hr (10:35 AM) post prandial and remain fasting until completion of the test with exception of enzymes needed or pain medication.

## 2017-06-13 LAB
DEPRECATED CALCIDIOL+CALCIFEROL SERPL-MC: NORMAL UG/L (ref 20–75)
VITAMIN D2 SERPL-MCNC: <5 UG/L
VITAMIN D3 SERPL-MCNC: 48 UG/L

## 2017-06-14 LAB
FOLATE RBC-MCNC: 331 NG/ML
HCT VFR BLD CALC: ABNORMAL %

## 2017-06-15 ENCOUNTER — OFFICE VISIT (OUTPATIENT)
Dept: TRANSPLANT | Facility: CLINIC | Age: 52
End: 2017-06-15
Attending: PEDIATRICS
Payer: COMMERCIAL

## 2017-06-15 VITALS
SYSTOLIC BLOOD PRESSURE: 125 MMHG | HEART RATE: 85 BPM | HEIGHT: 64 IN | BODY MASS INDEX: 24.84 KG/M2 | WEIGHT: 145.5 LBS | OXYGEN SATURATION: 100 % | DIASTOLIC BLOOD PRESSURE: 81 MMHG | RESPIRATION RATE: 16 BRPM | TEMPERATURE: 98.2 F

## 2017-06-15 DIAGNOSIS — Z90.410 POST-PANCREATECTOMY DIABETES (H): Primary | ICD-10-CM

## 2017-06-15 DIAGNOSIS — E13.9 POST-PANCREATECTOMY DIABETES (H): Primary | ICD-10-CM

## 2017-06-15 DIAGNOSIS — E89.1 POST-PANCREATECTOMY DIABETES (H): Primary | ICD-10-CM

## 2017-06-15 LAB
A-TOCOPHEROL VIT E SERPL-MCNC: 7.5
ANNOTATION COMMENT IMP: NORMAL
BETA+GAMMA TOCOPHEROL SERPL-MCNC: 0.5 MG/DL
RETINYL PALMITATE SERPL-MCNC: NORMAL UG/ML
VIT A SERPL-MCNC: 0.53 UG/ML
ZINC SERPL-MCNC: 80 UG/ML

## 2017-06-15 NOTE — NURSING NOTE
"Chief Complaint   Patient presents with     TP-IAT Transplant     follow up       Initial /81  Pulse 85  Temp 98.2  F (36.8  C) (Oral)  Resp 16  Ht 1.626 m (5' 4\")  Wt 66 kg (145 lb 8.1 oz)  LMP 12/19/2013  SpO2 100%  BMI 24.98 kg/m2 Estimated body mass index is 24.98 kg/(m^2) as calculated from the following:    Height as of this encounter: 1.626 m (5' 4\").    Weight as of this encounter: 66 kg (145 lb 8.1 oz).      "

## 2017-06-15 NOTE — PATIENT INSTRUCTIONS
1)  Let's keep your Lantus dose at 4 units daily.  Your fasting blood glucose is normal on this, and on the lower side of normal at 74 mg/dL, so I would not want to go up more on the Lantus as it might put you at risk for running too low when you do not eat.    2)  Your blood glucose goes up to 263 mg/dL at 1 hour after the Boost, which has about 45 grams carb.  Your islets DO still make insulin in response to this, just not quite enough to keep your numbers normal.  I would like to start a rapid acting insulin (Novolog) to cover high numbers and for some meals.   We will start with a really simple regimen of takin.5 unit per 45 grams of carb you plan to eat (round down, so if <45 grams, no coverage)  0.5 unit for every 100 mg/dL above 150 mg/dL.    This is a table you can use to help guide you based on your before eating blood glucose and what you are eating:     What your blood glucose is before eating:   How much you plan to eat: 80- 150 mg/dL 151- 250 mg/dL 251 to 350 mg/dL   Less than 45 grams carb 0 0.5 1   More than 45 grams carb 0.5 1 1.5     Follow Up  at 10am.

## 2017-06-15 NOTE — LETTER
6/15/2017       RE: Lynn Thompson  [address] [facility] [address]     Dear Colleague,    Thank you for referring your patient, Lynn Thompson, to the [facility] SOLID ORGAN TRANSPLANT at [facility]. Please see a copy of my visit note below.    [facility]  Islet Autotransplant, Diabetes Follow Up    Problem List:  Patient Active Problem List   Diagnosis     Iron deficiency anemia secondary to inadequate dietary iron intake     Gastric bypass status for obesity     Health Care Home     Chronic pain syndrome     Acquired absence of pancreas     Exocrine pancreatic insufficiency     Asplenia     BLU (obstructive sleep apnea)     S/P exploratory laparotomy     Dysthymia     Anxiety     Thyroid nodule     Acquired hypothyroidism     Post-pancreatectomy diabetes (H)       HPI:  Lynn is a 52 year old female here for follow up o total pancreatectomy, islet cell autotransplant, splenectomy, liver biopsy (needle core), choledochoduodenostomy, feeding jejunostomy performed on 5/10/2013.  At the time of the procedure, the patient received 178,100 IEQ, or 3,209 IEQ/kg body weight. She has had two subsequent exploratory laparatomy for small bowel obstruction. Other notable endocrine history includes a complex cystic nodule in mid right thyroid lobe with 1 cm maximum diameter (saw Dr Adorno in 11/2016) which needs annual follow up U/S in Nov 2017, and mild hypothyroidism followed by PCP.    At today's visit, I am seeing Lynn back for elevated BGs and elevated HbA1c level.  She had been insulin independent up until 6/6/2017.  At that time she called me for 'new' hyperglycemia, though her A1c had been mildly elevated prior to that.  Seems change was likely a progression to fasting hyperglycemia.  We restarted insulin at that time.  She has done extremely well since starting insulin.  She has some 70s, only one lower (68 mg/dl) and premeal  and bedtime generally running in low 100s.  We reviewed her mixed meal today which showed a spike with meal to 200s, then down to 151 at 2 hours.  She does have gastric bypass hx and dumping.  With review to other post TPIAT issues:  * Uses viokase which works very well for her-- no steatorrhea  * Now on suboxone and feels great on this-- minimal abdominal pain, not having pancreatitis pain, and is more alert.  Plans to wean off suboxone slowly with pain doctor supervision  * Not taking MVI routinely    Diabetes history:  Current insulin regimen:  Lantus 4 units per day  No Novolog since starting Lantus  TDD: 4 unit per day.    Recent hemoglobin A1c levels:  Lab Results   Component Value Date    A1C 7.0 2017    A1C 7.2 2016    A1C 7.2 2016    A1C 6.7 2016    A1C 6.3 2015     Labs today:  Component       C-Peptide Hemoglobin A1C Glucose   Latest Ref Rng & Units       0.9 - 6.9 ng/mL 4.3 - 6.0 % 70 - 99 mg/dL   2017      8:31 AM 0.1 (L) 7.0 (H) 74   2017      9:34 AM 1.3  263 (H)   2017      10:32 AM 1.6  153 (H)     Hypoglycemia history:  One 60s.  The patient has had NO episodes of severe hypoglycemia (seizure, coma, or neuroglycopenic symptoms severe enough to require assistance from another person).  Blood sugars were reviewed from the patient records and/or the meter download.    Average B mg/dL SD36 mg/dL  Number of blood sugar checks per day 2.8 (more recently)      Review of systems:  Review Of Systems  Skin: negative  Eyes: negative  Ears/Nose/Throat: negative  Respiratory: No shortness of breath, dyspnea on exertion, cough, or hemoptysis  Cardiovascular: negative  Gastrointestinal: as above  Genitourinary: negative  Musculoskeletal: negative  Neurologic: negative  Psychiatric: negative  Hematologic/Lymphatic/Immunologic: negative  Endocrine: as above    Past Medical and Surgical History:  Past Medical History:   Diagnosis Date     Abdominal pain, epigastric  , ongoing; goes to pain clinic    probable recurrent spasm sphincter of Oddi causing biliary colic pains      Benign paroxysmal positional vertigo     occ.      Calculus of kidney 5/05    x1 on L side passed, several stones.  Has been tested for oxalate.     Chronic abdominal pain 2013     Chronic pain     chronic pancreatitis     Chronic pancreatitis (H) 2013     Depressive disorder      Depressive disorder, not elsewhere classified     also occ panic spells     Diabetes (H)     post pancreatectomy     Dyspepsia and other specified disorders of function of stomach     H. pylori   treated     Headache 2004     Headache 2004     Headache 2004     Headache(784.0)     still periodic HA's ;  often 5X/week     Hypertension 16    Stress related     Iron deficiency anemia secondary to inadequate dietary iron intake     relates to gastric bypass     Obesity, unspecified     better after gastric bypass      Other chronic pain      Pancreatic disease      Post-pancreatectomy diabetes (H) 2013     Pyelonephritis, unspecified , 10/8    L side     Regional enteritis of unspecified site     inacive/remission     Sleep apnea     uses Cpap     Spasm of sphincter of Oddi     [place] [facility] [place]     Spasm of sphincter of Oddi     surgical + endoscopic stenting of pancreatic duct [place] [facility] [place]     Thyroid nodule 2016     Ureteral calculus 10/2/2012     Vaccination not carried out      Past Surgical History:   Procedure Laterality Date     APPENDECTOMY       C NONSPECIFIC PROCEDURE      R bunion     C NONSPECIFIC PROCEDURE      T & A     C NONSPECIFIC PROCEDURE      partial ileum resection     C NONSPECIFIC PROCEDURE      R ovarian cyst     C NONSPECIFIC PROCEDURE           C NONSPECIFIC PROCEDURE      GALL BLADDER     C NONSPECIFIC PROCEDURE      CBD stent; Dr. Presley     C  NONSPECIFIC PROCEDURE  6/02 & 12/05    colonoscopy     C NONSPECIFIC PROCEDURE  4/03    Gastric bypass     CHOLECYSTECTOMY       COLONOSCOPY       COMBINED CYSTOSCOPY, RETROGRADES, URETEROSCOPY, LASER HOLMIUM LITHOTRIPSY URETER(S), INSERT STENT Right 3/23/2015    Procedure: COMBINED CYSTOSCOPY, RETROGRADES, URETEROSCOPY, LASER HOLMIUM LITHOTRIPSY URETER(S), INSERT STENT;  Surgeon: Kennedi Aldana MD;  Location[facility]     COMBINED CYSTOSCOPY, RETROGRADES, URETEROSCOPY, LASER HOLMIUM LITHOTRIPSY URETER(S), INSERT STENT Right 4/20/2015    Procedure: COMBINED CYSTOSCOPY, RETROGRADES, URETEROSCOPY, LASER HOLMIUM LITHOTRIPSY URETER(S), INSERT STENT;  Surgeon: Kennedi Aldana MD;  Location: [facility] OR     COSMETIC SURGERY  6/24/2002    Tummy tuck     CYSTOSCOPY, RETROGRADES, INSERT STENT URETER(S), COMBINED  10/2/2012    Procedure: COMBINED CYSTOSCOPY, RETROGRADES, INSERT STENT URETER(S);  COMBINED CYSTOSCOPY,  , INSERT LEFT STENT URETER;  Surgeon: Johny Baez MD;  Location: [facility] OR     ENT SURGERY       EXTRACORPOREAL SHOCK WAVE LITHOTRIPSY (ESWL)  10/16/2012    Procedure: EXTRACORPOREAL SHOCK WAVE LITHOTRIPSY (ESWL);  left EXTRACORPOREAL SHOCK WAVE LITHOTRIPSY (ESWL) ;  Surgeon: Johny Baez MD;  Location: [facility] OR     GI SURGERY  12/29/2015    Small bowel obstruction     HC LAP,LYSIS OF ADHESIONS       HERNIA REPAIR  2/2015     LAPAROSCOPIC LYSIS ADHESIONS N/A 2/20/2015    Procedure: LAPAROSCOPIC LYSIS ADHESIONS;  Surgeon: Aaron Early MD;  [facility]     LAPAROSCOPIC LYSIS ADHESIONS N/A 12/29/2015    Procedure: LAPAROSCOPIC LYSIS ADHESIONS;  Surgeon: Aaron Early MD;  Location:  OR     PANCREATECTOMY, TRANSPLANT AUTO ISLET CELL, COMBINED  5/10/2013    Procedure: COMBINED PANCREATECTOMY, TRANSPLANT AUTO ISLET CELL;  Pancreatectomy, Auto Islet Cell Transplant   hernia repair, jejunostomy tube and liver biopsies with Anesthesia General with block;  Surgeon: Aaron Early MD;  [facility]  OR     TRANSPLANT  5/10/13       Family History:  New changes since last visit:  none  Family History   Problem Relation Age of Onset     Family History Negative Mother      Respiratory Father      COPD;  at 69     Genitourinary Problems Father      kidney stones     Substance Abuse Father      Depression Father      Asthma Father      HEART DISEASE Paternal Grandfather      M.I.     Coronary Artery Disease Paternal Grandfather      Hyperlipidemia Paternal Grandfather      Genitourinary Problems Brother      multiple brothers with kidney stones     GASTROINTESTINAL DISEASE Maternal Grandmother      undiagnosed 'gut' issues     Coronary Artery Disease Maternal Grandfather      Hyperlipidemia Maternal Grandfather      CEREBROVASCULAR DISEASE Paternal Grandmother      At the age of 103     Anxiety Disorder Paternal Grandmother      OSTEOPOROSIS Paternal Grandmother      Anxiety Disorder Son      Anxiety Disorder Daughter      Asthma Daughter        Social History:  Social History     Social History Narrative     She was laid off from her job recently and is interviewing for a new job.      Physical Exam:  Vitals: LMP 2013  BMI= There is no height or weight on file to calculate BMI.  General:  Well-appearing, NAD  Psych:  Communicative, with normal affect         Assessment:  1.  Post pancreatectomy diabetes mellitus, s/p total pancreatectomy and islet autotransplant.    Lynn is a 52 year old with history of chronic pancreatitis who is s/p total pancreatectomy and islet autotransplant.  She had been insulin independent for about 3 years, now with partial islet graft function.  She is doing well with very little insulin.  The only change I would like to make today is to add a very small premeal dose because of the post-meal hyperglycemia that she is having.  Because of her gastric dumping, she needs to take this before she eats (up to 10-15 minutes before), and if she forgets to take this she should skip  it-- do not take after eating due to risk of hypoglycemia.    Also recommended daily MVI.    Plan:  1.  Changes to current diabetes regimen:  Patient Instructions     1)  Let's keep your Lantus dose at 4 units daily.  Your fasting blood glucose is normal on this, and on the lower side of normal at 74 mg/dL, so I would not want to go up more on the Lantus as it might put you at risk for running too low when you do not eat.    2)  Your blood glucose goes up to 263 mg/dL at 1 hour after the Boost, which has about 45 grams carb.  Your islets DO still make insulin in response to this, just not quite enough to keep your numbers normal.  I would like to start a rapid acting insulin (Novolog) to cover high numbers and for some meals.   We will start with a really simple regimen of takin.5 unit per 45 grams of carb you plan to eat (round down, so if <45 grams, no coverage)  0.5 unit for every 100 mg/dL above 150 mg/dL.    This is a table you can use to help guide you based on your before eating blood glucose and what you are eating:     What your blood glucose is before eating:   How much you plan to eat: 80- 150 mg/dL 151- 250 mg/dL 251 to 350 mg/dL   Less than 45 grams carb 0 0.5 1   More than 45 grams carb 0.5 1 1.5     Follow Up  at 10am.      2.  Frequency of blood sugar checks:  premeal and bedtime, and as needed for hypoglycemia (6 strip per day).    3.  Continue routine follow up for autoislet transplant patients:  Mixed meal test (6 mL/kg BoostHP to max of 360 mL) at 3 months, 6 months, and once a year post transplant.  Hemoglobin A1c levels at these time points and quarterly.  4.  Other issues addressed today:  As above    Follow up:  3 mos    Contact me for questions at [phone] or [phone].  Emergency number to reach pediatric endocrinology after hours is [phone].        Adriana Robles MD  , Pediatric Endocrinology and Diabetes  [facility]  [facility]    I spent 20 minutes face to face with Lynn, with more than 50% of that time counseling on plan of care.    Again, thank you for allowing me to participate in the care of your patient.      Sincerely,    Adriana Robles MD

## 2017-06-15 NOTE — MR AVS SNAPSHOT
After Visit Summary   6/15/2017    Lynn Thompson    MRN: [id number]           Patient Information     Date Of Birth          1965        Visit Information        Provider Department      6/15/2017 11:20 AM Adriana Robles MD [facility] Solid Organ Transplant        Today's Diagnoses     Post-pancreatectomy diabetes (H)    -  1      Care Instructions    1)  Let's keep your Lantus dose at 4 units daily.  Your fasting blood glucose is normal on this, and on the lower side of normal at 74 mg/dL, so I would not want to go up more on the Lantus as it might put you at risk for running too low when you do not eat.    2)  Your blood glucose goes up to 263 mg/dL at 1 hour after the Boost, which has about 45 grams carb.  Your islets DO still make insulin in response to this, just not quite enough to keep your numbers normal.  I would like to start a rapid acting insulin (Novolog) to cover high numbers and for some meals.   We will start with a really simple regimen of takin.5 unit per 45 grams of carb you plan to eat (round down, so if <45 grams, no coverage)  0.5 unit for every 100 mg/dL above 150 mg/dL.    This is a table you can use to help guide you based on your before eating blood glucose and what you are eating:     What your blood glucose is before eating:   How much you plan to eat: 80- 150 mg/dL 151- 250 mg/dL 251 to 350 mg/dL   Less than 45 grams carb 0 0.5 1   More than 45 grams carb 0.5 1 1.5     Follow Up  at 10am.          Follow-ups after your visit        Follow-up notes from your care team     Return in about 3 months (around 9/15/2017).      Who to contact     If you have questions or need follow up information about today's clinic visit or your schedule please contact [facility] SOLID ORGAN TRANSPLANT directly at [phone].  Normal or non-critical lab and imaging results will be communicated to you by MyChart, letter or phone within 4 business days after the clinic has  "received the results. If you do not hear from us within 7 days, please contact the clinic through [facility] or phone. If you have a critical or abnormal lab result, we will notify you by phone as soon as possible.  Submit refill requests through [facility] or call your pharmacy and they will forward the refill request to us. Please allow 3 business days for your refill to be completed.          Additional Information About Your Visit        [facility] Information     [facility] gives you secure access to your electronic health record. If you see a primary care provider, you can also send messages to your care team and make appointments. If you have questions, please call your primary care clinic.  If you do not have a primary care provider, please call [phone] and they will assist you.        Care EveryWhere ID     This is your Care EveryWhere ID. This could be used by other organizations to access your [place] medical records  [id number]        Your Vitals Were     Pulse Temperature Respirations Height Last Period Pulse Oximetry    85 98.2  F (36.8  C) (Oral) 16 1.626 m (5' 4\") 12/19/2013 100%    BMI (Body Mass Index)                   24.98 kg/m2            Blood Pressure from Last 3 Encounters:   06/15/17 125/81   05/30/17 116/58   05/25/17 112/64    Weight from Last 3 Encounters:   06/15/17 66 kg (145 lb 8.1 oz)   06/12/17 65.4 kg (144 lb 3.2 oz)   05/30/17 66.3 kg (146 lb 3.2 oz)              Today, you had the following     No orders found for display         Today's Medication Changes          These changes are accurate as of: 6/15/17 12:00 PM.  If you have any questions, ask your nurse or doctor.               These medicines have changed or have updated prescriptions.        Dose/Directions    gabapentin 100 MG capsule   Commonly known as:  NEURONTIN   This may have changed:  how much to take   Used for:  Chronic pancreatitis (H)        Dose:  200 mg   Take 2 capsules (200 mg) by mouth 3 times daily "   Quantity:  180 capsule   Refills:  3                Primary Care Provider Office Phone # Fax #    Maryana Sivakumar Brooks [phone]       [facility] [address]  [facility] [id number]        Thank you!     Thank you for choosing [facility] SOLID ORGAN TRANSPLANT  for your care. Our goal is always to provide you with excellent care. Hearing back from our patients is one way we can continue to improve our services. Please take a few minutes to complete the written survey that you may receive in the mail after your visit with us. Thank you!             Your Updated Medication List - Protect others around you: Learn how to safely use, store and throw away your medicines at www.disposemymeds.org.          This list is accurate as of: 6/15/17 12:00 PM.  Always use your most recent med list.                   Brand Name Dispense Instructions for use    amylase-lipase-protease 43569 UNITS Tabs tablet    VIOKACE    600 tablet    4-5 aps with meals and 1-2 with snacks       blood glucose lancing device     1 each    Use to test blood sugars 6 times daily or as directed.       blood glucose monitoring test strip    BILL CONTOUR NEXT    2 Box    Check BS 4-6 times a day       BOOST HIGH PROTEIN Liqd      After above baseline labs are drawn, give: 6 mL/kg to maximum of 360 mL; the beverage is to be consumed within 5 minutes.       buprenorphine HCl-naloxone HCl 2-0.5 MG per film    SUBOXONE     Place 1 Film under the tongue daily Pt takes 1/2 bid       busPIRone 15 MG tablet    BUSPAR    120 tablet    Increased dose. 30 mg two times per day.       docusate 50 MG/5ML liquid    COLACE     Take 100 mg by mouth 2 times daily as needed for constipation       DULoxetine 30 MG EC capsule    CYMBALTA    180 capsule    Take 1 capsule (30 mg) by mouth 2 times daily Fill at patient request.       estradiol 0.1 MG/GM cream    ESTRACE VAGINAL    42.5 g    Place 2 g vaginally twice a week       estrogen  (conjugated)-medroxyPROGESTERone 0.3-1.5 MG per tablet    PREMPRO    90 tablet    Take 1 tablet by mouth daily       fluconazole 150 MG tablet    DIFLUCAN    2 tablet    Take 1 tab with onset of symptoms, then repeat dose 3 days later       gabapentin 100 MG capsule    NEURONTIN    180 capsule    Take 2 capsules (200 mg) by mouth 3 times daily       glucose 40 % Gel gel     1 Tube    Take 15-30 g by mouth every 15 minutes as needed.       insulin glargine 100 UNIT/ML injection    LANTUS SOLOSTAR    15 mL    Inject 4 Units Subcutaneous every 24 hours       insulin pen needle 32G X 4 MM    BD MAHESH U/F    100 each    Use up to 3 times daily as needed for insulin dosing       levothyroxine 75 MCG tablet    SYNTHROID/LEVOTHROID    90 tablet    Take 1 tablet (75 mcg) by mouth daily       loratadine 10 MG tablet    CLARITIN     Take 10 mg by mouth daily       modafinil 200 MG tablet    PROVIGIL     Take 2 tablets by mouth Once a Day       multivitamin CF formula Liqd liquid     75 mL    Take 2.5 mLs by mouth daily       nitrofurantoin (macrocrystal-monohydrate) 100 MG capsule    MACROBID    14 capsule    Take 1 capsule (100 mg) by mouth 2 times daily       NovoLOG penFILL 100 UNITS/ML injection   Generic drug:  insulin aspart     3 Month    Take as directed       ELKE FRIEND) Genny   Generic drug:  Injection Device for insulin      1 Device Inject 1 unit as needed for blood glucose above 140 mg/dL.       omeprazole 40 MG capsule    priLOSEC    180 capsule    Take 1 capsule (40 mg) by mouth 2 times daily Take 30-60 minutes before a meal.       polyethylene glycol Packet    MIRALAX/GLYCOLAX    7 packet    Take 17 g by mouth daily       sennosides 8.6 MG tablet    SENOKOT    120 tablet    Take 2 tablets by mouth 2 times daily       Sharps Container Misc     1 each    For insulin needles       traZODone 50 MG tablet    DESYREL    90 tablet    Take 1 tablet (50 mg) by mouth At Bedtime Fill at patient request.

## 2017-06-15 NOTE — PROGRESS NOTES
[facility]  Islet Autotransplant, Diabetes Follow Up    Problem List:  Patient Active Problem List   Diagnosis     Iron deficiency anemia secondary to inadequate dietary iron intake     Gastric bypass status for obesity     Health Care Home     Chronic pain syndrome     Acquired absence of pancreas     Exocrine pancreatic insufficiency     Asplenia     BLU (obstructive sleep apnea)     S/P exploratory laparotomy     Dysthymia     Anxiety     Thyroid nodule     Acquired hypothyroidism     Post-pancreatectomy diabetes (H)       HPI:  Lynn is a 52 year old female here for follow up o total pancreatectomy, islet cell autotransplant, splenectomy, liver biopsy (needle core), choledochoduodenostomy, feeding jejunostomy performed on 5/10/2013.  At the time of the procedure, the patient received 178,100 IEQ, or 3,209 IEQ/kg body weight. She has had two subsequent exploratory laparatomy for small bowel obstruction. Other notable endocrine history includes a complex cystic nodule in mid right thyroid lobe with 1 cm maximum diameter (saw Dr Adorno in 11/2016) which needs annual follow up U/S in Nov 2017, and mild hypothyroidism followed by PCP.    At today's visit, I am seeing Lnyn back for elevated BGs and elevated HbA1c level.  She had been insulin independent up until 6/6/2017.  At that time she called me for 'new' hyperglycemia, though her A1c had been mildly elevated prior to that.  Seems change was likely a progression to fasting hyperglycemia.  We restarted insulin at that time.  She has done extremely well since starting insulin.  She has some 70s, only one lower (68 mg/dl) and premeal and bedtime generally running in low 100s.  We reviewed her mixed meal today which showed a spike with meal to 200s, then down to 151 at 2 hours.  She does have gastric bypass hx and dumping.  With review to other post TPIAT issues:  * Uses viokase which works very well for her-- no steatorrhea  *  Now on suboxone and feels great on this-- minimal abdominal pain, not having pancreatitis pain, and is more alert.  Plans to wean off suboxone slowly with pain doctor supervision  * Not taking MVI routinely    Diabetes history:  Current insulin regimen:  Lantus 4 units per day  No Novolog since starting Lantus  TDD: 4 unit per day.    Recent hemoglobin A1c levels:  Lab Results   Component Value Date    A1C 7.0 2017    A1C 7.2 2016    A1C 7.2 2016    A1C 6.7 2016    A1C 6.3 2015     Labs today:  Component       C-Peptide Hemoglobin A1C Glucose   Latest Ref Rng & Units       0.9 - 6.9 ng/mL 4.3 - 6.0 % 70 - 99 mg/dL   2017      8:31 AM 0.1 (L) 7.0 (H) 74   2017      9:34 AM 1.3  263 (H)   2017      10:32 AM 1.6  153 (H)     Hypoglycemia history:  One 60s.  The patient has had NO episodes of severe hypoglycemia (seizure, coma, or neuroglycopenic symptoms severe enough to require assistance from another person).  Blood sugars were reviewed from the patient records and/or the meter download.    Average B mg/dL SD36 mg/dL  Number of blood sugar checks per day 2.8 (more recently)      Review of systems:  Review Of Systems  Skin: negative  Eyes: negative  Ears/Nose/Throat: negative  Respiratory: No shortness of breath, dyspnea on exertion, cough, or hemoptysis  Cardiovascular: negative  Gastrointestinal: as above  Genitourinary: negative  Musculoskeletal: negative  Neurologic: negative  Psychiatric: negative  Hematologic/Lymphatic/Immunologic: negative  Endocrine: as above    Past Medical and Surgical History:  Past Medical History:   Diagnosis Date     Abdominal pain, epigastric , ongoing; goes to pain clinic    probable recurrent spasm sphincter of Oddi causing biliary colic pains      Benign paroxysmal positional vertigo     occ.      Calculus of kidney 5/05    x1 on L side passed, several stones.  Has been tested for oxalate.     Chronic abdominal pain 2013      Chronic pain     chronic pancreatitis     Chronic pancreatitis (H) 2013     Depressive disorder      Depressive disorder, not elsewhere classified     also occ panic spells     Diabetes (H)     post pancreatectomy     Dyspepsia and other specified disorders of function of stomach     H. pylori   treated     Headache 2004     Headache 2004     Headache 2004     Headache(784.0)     still periodic HA's ;  often 5X/week     Hypertension 16    Stress related     Iron deficiency anemia secondary to inadequate dietary iron intake     relates to gastric bypass     Obesity, unspecified     better after gastric bypass      Other chronic pain      Pancreatic disease      Post-pancreatectomy diabetes (H) 2013     Pyelonephritis, unspecified , 10/8    L side     Regional enteritis of unspecified site     inacive/remission     Sleep apnea     uses Cpap     Spasm of sphincter of Oddi     ERCP with Stent of CBD [place] Fern[place] [facility] [place] sx relief     Spasm of sphincter of Oddi     surgical + endoscopic stenting of pancreatic duct @ [facility] 06     Thyroid nodule 2016     Ureteral calculus 10/2/2012     Vaccination not carried out      Past Surgical History:   Procedure Laterality Date     APPENDECTOMY       C NONSPECIFIC PROCEDURE      R bunion     C NONSPECIFIC PROCEDURE      T & A     C NONSPECIFIC PROCEDURE      partial ileum resection     C NONSPECIFIC PROCEDURE      R ovarian cyst     C NONSPECIFIC PROCEDURE           C NONSPECIFIC PROCEDURE      GALL BLADDER     C NONSPECIFIC PROCEDURE      CBD stent; Dr. Presley     C NONSPECIFIC PROCEDURE   &     colonoscopy     C NONSPECIFIC PROCEDURE      Gastric bypass     CHOLECYSTECTOMY       COLONOSCOPY       COMBINED CYSTOSCOPY, RETROGRADES, URETEROSCOPY, LASER HOLMIUM LITHOTRIPSY URETER(S), INSERT STENT Right 3/23/2015    Procedure: COMBINED CYSTOSCOPY,  RETROGRADES, URETEROSCOPY, LASER HOLMIUM LITHOTRIPSY URETER(S), INSERT STENT;  Surgeon: Kennedi Aldana MD;  Location: [facility] OR     COMBINED CYSTOSCOPY, RETROGRADES, URETEROSCOPY, LASER HOLMIUM LITHOTRIPSY URETER(S), INSERT STENT Right 2015    Procedure: COMBINED CYSTOSCOPY, RETROGRADES, URETEROSCOPY, LASER HOLMIUM LITHOTRIPSY URETER(S), INSERT STENT;  Surgeon: Kennedi Aldana MD;  Location: [facility] OR     COSMETIC SURGERY  2002    Tummy tuck     CYSTOSCOPY, RETROGRADES, INSERT STENT URETER(S), COMBINED  10/2/2012    Procedure: COMBINED CYSTOSCOPY, RETROGRADES, INSERT STENT URETER(S);  COMBINED CYSTOSCOPY,  , INSERT LEFT STENT URETER;  Surgeon: Johny Baez MD;  Location: [facility] OR     ENT SURGERY       EXTRACORPOREAL SHOCK WAVE LITHOTRIPSY (ESWL)  10/16/2012    Procedure: EXTRACORPOREAL SHOCK WAVE LITHOTRIPSY (ESWL);  left EXTRACORPOREAL SHOCK WAVE LITHOTRIPSY (ESWL) ;  Surgeon: Johny Baez MD;  Location: [facility] OR     GI SURGERY  2015    Small bowel obstruction     HC LAP,LYSIS OF ADHESIONS       HERNIA REPAIR  2015     LAPAROSCOPIC LYSIS ADHESIONS N/A 2015    Procedure: LAPAROSCOPIC LYSIS ADHESIONS;  Surgeon: Aaron Early MD;  [facility]     LAPAROSCOPIC LYSIS ADHESIONS N/A 2015    Procedure: LAPAROSCOPIC LYSIS ADHESIONS;  Surgeon: Aaron Early MD;  [facility]     PANCREATECTOMY, TRANSPLANT AUTO ISLET CELL, COMBINED  5/10/2013    Procedure: COMBINED PANCREATECTOMY, TRANSPLANT AUTO ISLET CELL;  Pancreatectomy, Auto Islet Cell Transplant   hernia repair, jejunostomy tube and liver biopsies with Anesthesia General with block;  Surgeon: Aaron Early MD;  [facility] OR     TRANSPLANT  5/10/13       Family History:  New changes since last visit:  none  Family History   Problem Relation Age of Onset     Family History Negative Mother      Respiratory Father      COPD;  at 69     Genitourinary Problems Father      kidney stones     Substance Abuse Father       Depression Father      Asthma Father      HEART DISEASE Paternal Grandfather      M.I.     Coronary Artery Disease Paternal Grandfather      Hyperlipidemia Paternal Grandfather      Genitourinary Problems Brother      multiple brothers with kidney stones     GASTROINTESTINAL DISEASE Maternal Grandmother      undiagnosed 'gut' issues     Coronary Artery Disease Maternal Grandfather      Hyperlipidemia Maternal Grandfather      CEREBROVASCULAR DISEASE Paternal Grandmother      At the age of 103     Anxiety Disorder Paternal Grandmother      OSTEOPOROSIS Paternal Grandmother      Anxiety Disorder Son      Anxiety Disorder Daughter      Asthma Daughter        Social History:  Social History     Social History Narrative     She was laid off from her job recently and is interviewing for a new job.      Physical Exam:  Vitals: LMP 12/19/2013  BMI= There is no height or weight on file to calculate BMI.  General:  Well-appearing, NAD  Psych:  Communicative, with normal affect         Assessment:  1.  Post pancreatectomy diabetes mellitus, s/p total pancreatectomy and islet autotransplant.    Lynn is a 52 year old with history of chronic pancreatitis who is s/p total pancreatectomy and islet autotransplant.  She had been insulin independent for about 3 years, now with partial islet graft function.  She is doing well with very little insulin.  The only change I would like to make today is to add a very small premeal dose because of the post-meal hyperglycemia that she is having.  Because of her gastric dumping, she needs to take this before she eats (up to 10-15 minutes before), and if she forgets to take this she should skip it-- do not take after eating due to risk of hypoglycemia.    Also recommended daily MVI.    Plan:  1.  Changes to current diabetes regimen:  Patient Instructions     1)  Let's keep your Lantus dose at 4 units daily.  Your fasting blood glucose is normal on this, and on the lower side of normal at  74 mg/dL, so I would not want to go up more on the Lantus as it might put you at risk for running too low when you do not eat.    2)  Your blood glucose goes up to 263 mg/dL at 1 hour after the Boost, which has about 45 grams carb.  Your islets DO still make insulin in response to this, just not quite enough to keep your numbers normal.  I would like to start a rapid acting insulin (Novolog) to cover high numbers and for some meals.   We will start with a really simple regimen of takin.5 unit per 45 grams of carb you plan to eat (round down, so if <45 grams, no coverage)  0.5 unit for every 100 mg/dL above 150 mg/dL.    This is a table you can use to help guide you based on your before eating blood glucose and what you are eating:     What your blood glucose is before eating:   How much you plan to eat: 80- 150 mg/dL 151- 250 mg/dL 251 to 350 mg/dL   Less than 45 grams carb 0 0.5 1   More than 45 grams carb 0.5 1 1.5     Follow Up Sept 7 at 10am.      2.  Frequency of blood sugar checks:  premeal and bedtime, and as needed for hypoglycemia (6 strip per day).    3.  Continue routine follow up for autoislet transplant patients:  Mixed meal test (6 mL/kg BoostHP to max of 360 mL) at 3 months, 6 months, and once a year post transplant.  Hemoglobin A1c levels at these time points and quarterly.  4.  Other issues addressed today:  As above    Follow up:  3 mos    Contact me for questions at [phone] or [phone].  Emergency number to reach pediatric endocrinology after hours is [phone].        Adriana Robles MD  , Pediatric Endocrinology and Diabetes  [facility] [place]  [employer]    I spent 20 minutes face to face with Lynn, with more than 50% of that time counseling on plan of care.

## 2017-06-27 DIAGNOSIS — K86.89 PANCREATIC INSUFFICIENCY: ICD-10-CM

## 2017-07-02 DIAGNOSIS — F41.9 ANXIETY: ICD-10-CM

## 2017-07-06 RX ORDER — BUSPIRONE HYDROCHLORIDE 15 MG/1
TABLET ORAL
Qty: 120 TABLET | Refills: 3 | Status: SHIPPED | OUTPATIENT
Start: 2017-07-06 | End: 2017-12-06

## 2017-07-06 RX ORDER — BUSPIRONE HYDROCHLORIDE 15 MG/1
TABLET ORAL
Qty: 120 TABLET | Refills: 2 | OUTPATIENT
Start: 2017-07-06

## 2017-08-24 ENCOUNTER — HOSPITAL ENCOUNTER (EMERGENCY)
Facility: CLINIC | Age: 52
Discharge: HOME | End: 2017-08-24
Attending: EMERGENCY MEDICINE | Admitting: EMERGENCY MEDICINE
Payer: COMMERCIAL

## 2017-08-24 ENCOUNTER — APPOINTMENT (OUTPATIENT)
Dept: GENERAL RADIOLOGY | Facility: CLINIC | Age: 52
End: 2017-08-24
Attending: EMERGENCY MEDICINE
Payer: COMMERCIAL

## 2017-08-24 ENCOUNTER — APPOINTMENT (OUTPATIENT)
Dept: CT IMAGING | Facility: CLINIC | Age: 52
End: 2017-08-24
Attending: EMERGENCY MEDICINE
Payer: COMMERCIAL

## 2017-08-24 ENCOUNTER — TELEPHONE (OUTPATIENT)
Dept: EMERGENCY MEDICINE | Facility: CLINIC | Age: 52
End: 2017-08-24

## 2017-08-24 ENCOUNTER — HOSPITAL ENCOUNTER (INPATIENT)
Facility: CLINIC | Age: 52
LOS: 3 days | Discharge: HOME | End: 2017-08-28
Attending: EMERGENCY MEDICINE | Admitting: HOSPITALIST
Payer: COMMERCIAL

## 2017-08-24 VITALS
RESPIRATION RATE: 20 BRPM | DIASTOLIC BLOOD PRESSURE: 71 MMHG | TEMPERATURE: 102.6 F | BODY MASS INDEX: 24.03 KG/M2 | WEIGHT: 140 LBS | SYSTOLIC BLOOD PRESSURE: 125 MMHG | OXYGEN SATURATION: 100 %

## 2017-08-24 DIAGNOSIS — T36.95XA ANTIBIOTIC-ASSOCIATED DIARRHEA: ICD-10-CM

## 2017-08-24 DIAGNOSIS — J01.90 ACUTE SINUSITIS, RECURRENCE NOT SPECIFIED, UNSPECIFIED LOCATION: ICD-10-CM

## 2017-08-24 DIAGNOSIS — K52.1 ANTIBIOTIC-ASSOCIATED DIARRHEA: ICD-10-CM

## 2017-08-24 DIAGNOSIS — R79.89 ELEVATED LFTS: ICD-10-CM

## 2017-08-24 DIAGNOSIS — R78.81 POSITIVE BLOOD CULTURE: ICD-10-CM

## 2017-08-24 DIAGNOSIS — R50.9 FEVER, UNSPECIFIED: ICD-10-CM

## 2017-08-24 DIAGNOSIS — R78.81 E COLI BACTEREMIA: Primary | ICD-10-CM

## 2017-08-24 DIAGNOSIS — B96.20 E COLI BACTEREMIA: Primary | ICD-10-CM

## 2017-08-24 LAB
ALBUMIN SERPL-MCNC: 3.2 G/DL (ref 3.4–5)
ALBUMIN SERPL-MCNC: 3.6 G/DL (ref 3.4–5)
ALBUMIN UR-MCNC: NEGATIVE MG/DL
ALP SERPL-CCNC: 271 U/L (ref 40–150)
ALP SERPL-CCNC: 289 U/L (ref 40–150)
ALT SERPL W P-5'-P-CCNC: 248 U/L (ref 0–50)
ALT SERPL W P-5'-P-CCNC: 314 U/L (ref 0–50)
ANION GAP SERPL CALCULATED.3IONS-SCNC: 5 MMOL/L (ref 3–14)
APPEARANCE UR: CLEAR
AST SERPL W P-5'-P-CCNC: 329 U/L (ref 0–45)
AST SERPL W P-5'-P-CCNC: 337 U/L (ref 0–45)
BASOPHILS # BLD AUTO: 0.1 10E9/L (ref 0–0.2)
BASOPHILS # BLD AUTO: 0.1 10E9/L (ref 0–0.2)
BASOPHILS NFR BLD AUTO: 0.5 %
BASOPHILS NFR BLD AUTO: 0.9 %
BILIRUB DIRECT SERPL-MCNC: 0.1 MG/DL (ref 0–0.2)
BILIRUB SERPL-MCNC: 0.4 MG/DL (ref 0.2–1.3)
BILIRUB SERPL-MCNC: 0.4 MG/DL (ref 0.2–1.3)
BILIRUB UR QL STRIP: NEGATIVE
BUN SERPL-MCNC: 18 MG/DL (ref 7–30)
CALCIUM SERPL-MCNC: 8.7 MG/DL (ref 8.5–10.1)
CHLORIDE SERPL-SCNC: 103 MMOL/L (ref 94–109)
CO2 SERPL-SCNC: 32 MMOL/L (ref 20–32)
COLOR UR AUTO: YELLOW
CREAT SERPL-MCNC: 0.75 MG/DL (ref 0.52–1.04)
DIFFERENTIAL METHOD BLD: ABNORMAL
DIFFERENTIAL METHOD BLD: NORMAL
EOSINOPHIL # BLD AUTO: 0 10E9/L (ref 0–0.7)
EOSINOPHIL # BLD AUTO: 0.1 10E9/L (ref 0–0.7)
EOSINOPHIL NFR BLD AUTO: 0.5 %
EOSINOPHIL NFR BLD AUTO: 0.5 %
ERYTHROCYTE [DISTWIDTH] IN BLOOD BY AUTOMATED COUNT: 13 % (ref 10–15)
ERYTHROCYTE [DISTWIDTH] IN BLOOD BY AUTOMATED COUNT: 13 % (ref 10–15)
GFR SERPL CREATININE-BSD FRML MDRD: 80 ML/MIN/1.7M2
GLUCOSE BLDC GLUCOMTR-MCNC: 80 MG/DL (ref 70–99)
GLUCOSE SERPL-MCNC: 83 MG/DL (ref 70–99)
GLUCOSE UR STRIP-MCNC: NEGATIVE MG/DL
HCT VFR BLD AUTO: 41.2 % (ref 35–47)
HCT VFR BLD AUTO: 42.4 % (ref 35–47)
HGB BLD-MCNC: 13.4 G/DL (ref 11.7–15.7)
HGB BLD-MCNC: 13.7 G/DL (ref 11.7–15.7)
HGB UR QL STRIP: NEGATIVE
IMM GRANULOCYTES # BLD: 0 10E9/L (ref 0–0.4)
IMM GRANULOCYTES # BLD: 0.1 10E9/L (ref 0–0.4)
IMM GRANULOCYTES NFR BLD: 0.3 %
IMM GRANULOCYTES NFR BLD: 0.5 %
KETONES UR STRIP-MCNC: NEGATIVE MG/DL
LACTATE BLD-SCNC: 1.3 MMOL/L (ref 0.7–2)
LEUKOCYTE ESTERASE UR QL STRIP: NEGATIVE
LIPASE SERPL-CCNC: 41 U/L (ref 73–393)
LYMPHOCYTES # BLD AUTO: 0.6 10E9/L (ref 0.8–5.3)
LYMPHOCYTES # BLD AUTO: 1 10E9/L (ref 0.8–5.3)
LYMPHOCYTES NFR BLD AUTO: 12.3 %
LYMPHOCYTES NFR BLD AUTO: 4.5 %
MCH RBC QN AUTO: 29.9 PG (ref 26.5–33)
MCH RBC QN AUTO: 30.4 PG (ref 26.5–33)
MCHC RBC AUTO-ENTMCNC: 32.3 G/DL (ref 31.5–36.5)
MCHC RBC AUTO-ENTMCNC: 32.5 G/DL (ref 31.5–36.5)
MCV RBC AUTO: 93 FL (ref 78–100)
MCV RBC AUTO: 93 FL (ref 78–100)
MONOCYTES # BLD AUTO: 0.5 10E9/L (ref 0–1.3)
MONOCYTES # BLD AUTO: 0.9 10E9/L (ref 0–1.3)
MONOCYTES NFR BLD AUTO: 6.1 %
MONOCYTES NFR BLD AUTO: 6.6 %
NEUTROPHILS # BLD AUTO: 11.6 10E9/L (ref 1.6–8.3)
NEUTROPHILS # BLD AUTO: 6.2 10E9/L (ref 1.6–8.3)
NEUTROPHILS NFR BLD AUTO: 79.9 %
NEUTROPHILS NFR BLD AUTO: 87.4 %
NITRATE UR QL: NEGATIVE
NRBC # BLD AUTO: 0 10*3/UL
NRBC # BLD AUTO: 0 10*3/UL
NRBC BLD AUTO-RTO: 0 /100
NRBC BLD AUTO-RTO: 0 /100
PH UR STRIP: 6 PH (ref 5–7)
PLATELET # BLD AUTO: 327 10E9/L (ref 150–450)
PLATELET # BLD AUTO: 354 10E9/L (ref 150–450)
PLATELET # BLD EST: NORMAL 10*3/UL
POTASSIUM SERPL-SCNC: 4.1 MMOL/L (ref 3.4–5.3)
PROT SERPL-MCNC: 7 G/DL (ref 6.8–8.8)
PROT SERPL-MCNC: 7.6 G/DL (ref 6.8–8.8)
RBC # BLD AUTO: 4.41 10E12/L (ref 3.8–5.2)
RBC # BLD AUTO: 4.58 10E12/L (ref 3.8–5.2)
RBC #/AREA URNS AUTO: 3 /HPF (ref 0–2)
RBC MORPH BLD: ABNORMAL
SODIUM SERPL-SCNC: 140 MMOL/L (ref 133–144)
SOURCE: ABNORMAL
SP GR UR STRIP: 1.02 (ref 1–1.03)
SQUAMOUS #/AREA URNS AUTO: 1 /HPF (ref 0–1)
UROBILINOGEN UR STRIP-MCNC: 0 MG/DL (ref 0–2)
WBC # BLD AUTO: 13.2 10E9/L (ref 4–11)
WBC # BLD AUTO: 7.7 10E9/L (ref 4–11)
WBC #/AREA URNS AUTO: 1 /HPF (ref 0–2)

## 2017-08-24 PROCEDURE — 87040 BLOOD CULTURE FOR BACTERIA: CPT | Performed by: EMERGENCY MEDICINE

## 2017-08-24 PROCEDURE — 80076 HEPATIC FUNCTION PANEL: CPT | Performed by: EMERGENCY MEDICINE

## 2017-08-24 PROCEDURE — 25000128 H RX IP 250 OP 636: Performed by: EMERGENCY MEDICINE

## 2017-08-24 PROCEDURE — 36415 COLL VENOUS BLD VENIPUNCTURE: CPT | Performed by: EMERGENCY MEDICINE

## 2017-08-24 PROCEDURE — 96360 HYDRATION IV INFUSION INIT: CPT

## 2017-08-24 PROCEDURE — 87077 CULTURE AEROBIC IDENTIFY: CPT | Performed by: EMERGENCY MEDICINE

## 2017-08-24 PROCEDURE — 80053 COMPREHEN METABOLIC PANEL: CPT | Performed by: EMERGENCY MEDICINE

## 2017-08-24 PROCEDURE — 99285 EMERGENCY DEPT VISIT HI MDM: CPT

## 2017-08-24 PROCEDURE — 81001 URINALYSIS AUTO W/SCOPE: CPT | Performed by: EMERGENCY MEDICINE

## 2017-08-24 PROCEDURE — 85025 COMPLETE CBC W/AUTO DIFF WBC: CPT | Performed by: EMERGENCY MEDICINE

## 2017-08-24 PROCEDURE — 83690 ASSAY OF LIPASE: CPT | Performed by: EMERGENCY MEDICINE

## 2017-08-24 PROCEDURE — 99285 EMERGENCY DEPT VISIT HI MDM: CPT | Mod: 25

## 2017-08-24 PROCEDURE — 87086 URINE CULTURE/COLONY COUNT: CPT | Performed by: EMERGENCY MEDICINE

## 2017-08-24 PROCEDURE — 87800 DETECT AGNT MULT DNA DIREC: CPT | Performed by: EMERGENCY MEDICINE

## 2017-08-24 PROCEDURE — 74177 CT ABD & PELVIS W/CONTRAST: CPT

## 2017-08-24 PROCEDURE — 83605 ASSAY OF LACTIC ACID: CPT | Performed by: EMERGENCY MEDICINE

## 2017-08-24 PROCEDURE — 00000146 ZZHCL STATISTIC GLUCOSE BY METER IP

## 2017-08-24 PROCEDURE — 71020 XR CHEST 2 VW: CPT

## 2017-08-24 PROCEDURE — 96361 HYDRATE IV INFUSION ADD-ON: CPT

## 2017-08-24 PROCEDURE — 87186 SC STD MICRODIL/AGAR DIL: CPT | Performed by: EMERGENCY MEDICINE

## 2017-08-24 RX ORDER — SODIUM CHLORIDE 9 MG/ML
1000 INJECTION, SOLUTION INTRAVENOUS CONTINUOUS
Status: DISCONTINUED | OUTPATIENT
Start: 2017-08-24 | End: 2017-08-24 | Stop reason: HOSPADM

## 2017-08-24 RX ORDER — FLUCONAZOLE 150 MG/1
150 TABLET ORAL ONCE
Qty: 1 TABLET | Refills: 0 | Status: ON HOLD | OUTPATIENT
Start: 2017-08-24 | End: 2017-08-25

## 2017-08-24 RX ORDER — IOPAMIDOL 755 MG/ML
500 INJECTION, SOLUTION INTRAVASCULAR ONCE
Status: COMPLETED | OUTPATIENT
Start: 2017-08-24 | End: 2017-08-24

## 2017-08-24 RX ADMIN — IOPAMIDOL 71 ML: 755 INJECTION, SOLUTION INTRAVENOUS at 06:57

## 2017-08-24 RX ADMIN — SODIUM CHLORIDE 1000 ML: 9 INJECTION, SOLUTION INTRAVENOUS at 06:43

## 2017-08-24 RX ADMIN — SODIUM CHLORIDE 58 ML: 9 INJECTION, SOLUTION INTRAVENOUS at 06:57

## 2017-08-24 ASSESSMENT — ENCOUNTER SYMPTOMS
HEMATURIA: 0
FEVER: 1
RHINORRHEA: 1
DYSURIA: 0
VOMITING: 0
NAUSEA: 0
COUGH: 0
BLOOD IN STOOL: 0
SINUS PRESSURE: 1
ANAL BLEEDING: 0
DIARRHEA: 0
ABDOMINAL DISTENTION: 0
ABDOMINAL PAIN: 1
SORE THROAT: 1
SHORTNESS OF BREATH: 0
DYSURIA: 0
CONSTIPATION: 0
CHILLS: 1
FEVER: 1
COUGH: 0

## 2017-08-24 NOTE — IP AVS SNAPSHOT
"    [facility]: [phone]                                              INTERAGENCY TRANSFER FORM - PHYSICIAN ORDERS   2017                    Hospital Admission Date: 2017  ANGEL CARPENTER   : 1965  Sex: Female        Attending Provider: Tania Sotelo MD     Allergies:  Povidone Iodine, Corticosteroids, Nsaids, Povidone Iodine, Sulfasalazine    Infection:  None   Service:  GENERAL [facility]    Ht:  1.626 m (5' 4\")   Wt:  68 kg (150 lb)   Admission Wt:  68 kg (150 lb)    BMI:  25.75 kg/m 2   BSA:  1.75 m 2            Patient PCP Information     Provider PCP Type    Maryana Brooks MD General      ED Clinical Impression     Diagnosis Description Comment Added By Time Added    Positive blood culture [R78.81] Positive blood culture [R78.81]  James Torres DO 2017  9:29 PM    Elevated LFTs [R79.89] Elevated LFTs [R79.89]  James Torres DO 2017 12:12 AM      Hospital Problems as of 2017              Priority Class Noted POA    Gastric bypass status for obesity Medium  10/26/2010 Yes    Pancreatic insufficiency Medium  2013 Yes    Exocrine pancreatic insufficiency Medium  2013 Yes    Asplenia Medium  10/24/2014 Yes    Post-pancreatectomy diabetes (H) Medium  2017 Yes    * (Principal)E coli bacteremia Medium  2017 Yes    Antibiotic-associated diarrhea Medium  2017 Unknown    Elevated LFTs Medium  2017 Unknown      Non-Hospital Problems as of 2017              Priority Class Noted    Iron deficiency anemia secondary to inadequate dietary iron intake Medium  2004    Health Care Home High  5/3/2011    Chronic pain syndrome Medium  2013    BLU (obstructive sleep apnea) Medium  2015    S/P exploratory laparotomy Medium  2015    Dysthymia Medium  3/1/2016    Anxiety Medium  3/1/2016    Thyroid nodule Medium  2016    Acquired hypothyroidism Medium  11/3/2016      Code Status History     " Date Active Date Inactive Code Status Order ID Comments User Context    8/28/2017  1:55 PM  Full Code [id number]  Matheus Hickman MD Outpatient    8/25/2017  1:10 AM 8/28/2017  1:55 PM Full Code [id number]  Tania Sotelo MD Inpatient    1/1/2016  4:21 PM 8/25/2017  1:10 AM Full Code [id number]  Pina Almaraz MD Outpatient    12/29/2015  6:43 PM 1/1/2016  4:21 PM Full Code [id number]  Renetta Ledbetter MD Inpatient    11/22/2015  2:07 AM 11/24/2015  6:43 PM Full Code [id number]  Adelfo Ervin MD Inpatient    3/24/2015 10:41 AM 11/22/2015  2:07 AM Full Code [id number]  Erlinda Godoy MD Outpatient    3/24/2015  4:37 AM 3/24/2015 10:41 AM Full Code [id number]  Aggie Melo MD Inpatient    5/21/2014  6:03 AM 5/23/2014  3:53 PM Full Code [id number]  Jaguar Bravo MD Inpatient    10/6/2012 10:59 AM 5/21/2014  6:03 AM Full Code [id number]  Mo Harden DO Outpatient    10/2/2012 10:28 PM 10/6/2012 10:59 AM Full Code [id number]  Radha Hall RN Inpatient    10/2/2012  4:18 PM 10/2/2012 10:28 PM Full Code [id number]  Marci Saunders RN Inpatient         Medication Review      START taking        Dose / Directions Comments    cefuroxime 500 MG tablet   Commonly known as:  CEFTIN   Indication:  Bacteria in the Blood        Dose:  500 mg   Take 1 tablet (500 mg) by mouth every 12 hours for 11 days   Quantity:  22 tablet   Refills:  0        lactobacillus Tabs        Dose:  1 tablet   Take 1 tablet by mouth 3 times daily for 14 days   Quantity:  42 tablet   Refills:  0          CONTINUE these medications which may have CHANGED, or have new prescriptions. If we are uncertain of the size of tablets/capsules you have at home, strength may be listed as something that might have changed.        Dose / Directions Comments    insulin glargine 100 UNIT/ML injection   Commonly known as:  LANTUS SOLOSTAR   This may have changed:  when to take this   Used for:  Post-pancreatectomy  diabetes (H)        Dose:  4 Units   Inject 4 Units Subcutaneous every 24 hours   Quantity:  15 mL   Refills:  1    May substitute Basaglar if Lantus is not on formulary.         CONTINUE these medications which have NOT CHANGED        Dose / Directions Comments    ACETAMINOPHEN PO        Dose:  325 mg   Take 325 mg by mouth every 6 hours as needed for pain   Refills:  0        amylase-lipase-protease 04974 UNITS Tabs tablet   Commonly known as:  VIOKACE   Used for:  Pancreatic insufficiency        4-5 aps with meals and 1-2 with snacks   Quantity:  600 tablet   Refills:  5        blood glucose lancing device   Used for:  Absence of pancreas, acquired        Use to test blood sugars 6 times daily or as directed.   Quantity:  1 each   Refills:  11        blood glucose monitoring test strip   Commonly known as:  Recurve CONTOUR NEXT   Used for:  Absence of pancreas, acquired        Check BS 4-6 times a day   Quantity:  2 Box   Refills:  6        * buprenorphine HCl-naloxone HCl 2-0.5 MG per film   Commonly known as:  SUBOXONE        Dose:  0.5 Film   Place 0.5 Film under the tongue every morning   Refills:  0        * buprenorphine HCl-naloxone HCl 2-0.5 MG per film   Commonly known as:  SUBOXONE        Dose:  0.25 Film   Place 0.25 Film under the tongue every evening   Refills:  0        busPIRone 15 MG tablet   Commonly known as:  BUSPAR   Used for:  Anxiety        Increased dose. 30 mg two times per day.   Quantity:  120 tablet   Refills:  3        CLONIDINE HCL PO        Dose:  0.1 mg   Take 0.1 mg by mouth 2 times daily as needed   Refills:  0        DOCUSATE SODIUM PO        Dose:  100 mg   Take 100 mg by mouth daily   Refills:  0        DULoxetine 30 MG EC capsule   Commonly known as:  CYMBALTA   Used for:  Anxiety        Dose:  30 mg   Take 1 capsule (30 mg) by mouth 2 times daily Fill at patient request.   Quantity:  180 capsule   Refills:  1        estradiol 0.1 MG/GM cream   Commonly known as:  ESTRACE  VAGINAL   Used for:  Vaginal atrophy        Dose:  2 g   Place 2 g vaginally twice a week   Quantity:  42.5 g   Refills:  11        GABAPENTIN PO        Dose:  100 mg   Take 100 mg by mouth 3 times daily   Refills:  0        glucose 40 % Gel gel   Used for:  Chronic pancreatitis (H)        Dose:  15-30 g   Take 15-30 g by mouth every 15 minutes as needed.   Quantity:  1 Tube   Refills:  11        insulin pen needle 32G X 4 MM   Commonly known as:  BD MAHESH U/F   Used for:  Acquired absence of pancreas        Use up to 3 times daily as needed for insulin dosing   Quantity:  100 each   Refills:  prn        levothyroxine 75 MCG tablet   Commonly known as:  SYNTHROID/LEVOTHROID   Used for:  Acquired hypothyroidism        Dose:  75 mcg   Take 1 tablet (75 mcg) by mouth daily   Quantity:  90 tablet   Refills:  1        loratadine 10 MG tablet   Commonly known as:  CLARITIN   Indication:  Hayfever        Dose:  10 mg   Take 10 mg by mouth daily as needed   Refills:  0        modafinil 200 MG tablet   Commonly known as:  PROVIGIL        Dose:  2 tablet   Take 2 tablets by mouth Once a Day   Refills:  0        MOVANTIK 25 MG Tabs tablet   Generic drug:  naloxegol        Dose:  25 mg   Take 25 mg by mouth every morning (before breakfast)   Refills:  0        naloxone nasal spray   Commonly known as:  NARCAN        Dose:  4 mg   Spray 4 mg in nostril as needed for opioid reversal   Refills:  0        NovoLOG penFILL 100 UNITS/ML injection   Used for:  Diabetes mellitus type 1 (H)   Generic drug:  insulin aspart        Take as directed   Quantity:  3 Month   Refills:  1        NOVOPEN JR (GUMARO Royal   Generic drug:  Injection Device for insulin        Dose:  1 Device   1 Device Inject 1 unit (which is marked as a 1/2 unit on the pen itself)  as needed when eating carb heavy meals.   Refills:  0        omeprazole 40 MG capsule   Commonly known as:  priLOSEC   Used for:  Gastroesophageal reflux disease without esophagitis,  Nausea, Gastric bypass status for obesity        Dose:  40 mg   Take 1 capsule (40 mg) by mouth 2 times daily Take 30-60 minutes before a meal.   Quantity:  180 capsule   Refills:  3        polyethylene glycol Packet   Commonly known as:  MIRALAX/GLYCOLAX   Used for:  Constipation        Dose:  17 g   Take 17 g by mouth daily   Quantity:  7 packet   Refills:  0        sennosides 8.6 MG tablet   Commonly known as:  SENOKOT   Used for:  Other constipation        Dose:  2 tablet   Take 2 tablets by mouth 2 times daily   Quantity:  120 tablet   Refills:  1        Sharps Container Misc   Used for:  Absence of pancreas, acquired        For insulin needles   Quantity:  1 each   Refills:  prn        TRAZODONE HCL PO        Dose:  50 mg   Take 50 mg by mouth nightly as needed for sleep   Refills:  0        * Notice:  This list has 2 medication(s) that are the same as other medications prescribed for you. Read the directions carefully, and ask your doctor or other care provider to review them with you.      STOP taking     amoxicillin-clavulanate 875-125 MG per tablet   Commonly known as:  AUGMENTIN                   Summary of Visit     Reason for your hospital stay       You were found to have bacteria in your blood that was sensitive to Ceftin. You should complete the course dispensed and follow up with Dr. Marrero with any concern regarding fever or increased abd pain/diarrhea in the next couple of weeks.             After Care     Activity       Your activity upon discharge: activity as tolerated       Diet       Follow this diet upon discharge: Regular             Your next 10 appointments already scheduled     Sep 07, 2017 10:00 AM CDT   (Arrive by 9:45 AM)   Return Auto Islet with Adriana Robles MD   [facility] ([facility])    [address]  [address] [place] [address] [phone]              Follow-Up Appointment Instructions     Future  Labs/Procedures    Follow-up and recommended labs and tests      Comments:    Follow up with Dr. Marrero as needed for suspected complications related to this infection or treatment.      Follow-Up Appointment Instructions     Follow-up and recommended labs and tests        Follow up with Dr. Marrero as needed for suspected complications related to this infection or treatment.             Statement of Approval     Ordered          08/28/17 1355  I have reviewed and agree with all the recommendations and orders detailed in this document.  EFFECTIVE NOW     Approved and electronically signed by:  Matheus Hickman MD

## 2017-08-24 NOTE — ED AVS SNAPSHOT
[facility] Emergency Department    [address]    [facility] [address]    Phone:  [phone]    Fax:  [phone]                                       Lynn Thompson   MRN: [id number]    Department:  [facility] Emergency Department   Date of Visit:  8/24/2017           After Visit Summary Signature Page     I have received my discharge instructions, and my questions have been answered. I have discussed any challenges I see with this plan with the nurse or doctor.    ..........................................................................................................................................  Patient/Patient Representative Signature      ..........................................................................................................................................  Patient Representative Print Name and Relationship to Patient    ..................................................               ................................................  Date                                            Time    ..........................................................................................................................................  Reviewed by Signature/Title    ...................................................              ..............................................  Date                                                            Time

## 2017-08-24 NOTE — ED NOTES
Pt presents to the ED with a sharp pain in upper back and bloated feeling. Now with a fever, ABC's intact. A&oX4.

## 2017-08-24 NOTE — IP AVS SNAPSHOT
MRN:[id number]                      After Visit Summary   8/24/2017    Lynn Thompson    MRN: [id number]           Thank you!     Thank you for choosing [facility] for your care. Our goal is always to provide you with excellent care. Hearing back from our patients is one way we can continue to improve our services. Please take a few minutes to complete the written survey that you may receive in the mail after you visit. If you would like to speak to someone directly about your visit please contact Patient Relations at [phone]. Thank you!          Patient Information     Date Of Birth          1965        Designated Caregiver       Most Recent Value    Caregiver    Will someone help with your care after discharge? yes    Name of designated caregiver Tera Thompson, spouse    Phone number of caregiver [phone]    Caregiver address same as pt      About your hospital stay     You were admitted on:  August 25, 2017 You last received care in the:  [facility]    You were discharged on:  August 28, 2017        Reason for your hospital stay       You were found to have bacteria in your blood that was sensitive to Ceftin. You should complete the course dispensed and follow up with Dr. Marrero with any concern regarding fever or increased abd pain/diarrhea in the next couple of weeks.                  Who to Call     For medical emergencies, please call 911.  For non-urgent questions about your medical care, please call your primary care provider or clinic, [phone]          Attending Provider     Provider Specialty    James Torres DO Emergency Medicine    Tania Sotelo MD Internal Medicine       Primary Care Provider Office Phone # Fax #    Maryana Sivakumar Brooks [phone]      After Care Instructions     Activity       Your activity upon discharge: activity as tolerated            Diet       Follow this diet upon  "discharge: Regular                  Follow-up Appointments     Follow-up and recommended labs and tests        Follow up with Dr. Marrero as needed for suspected complications related to this infection or treatment.                  Your next 10 appointments already scheduled     Sep 07, 2017 10:00 AM CDT   (Arrive by 9:45 AM)   Return [place] with Adriana Robles MD   [facility] ([facility] [place])    [address]  [address] [facility] [address] [phone]              Pending Results     Date and Time Order Name Status Description    8/25/2017 0128 Blood culture Preliminary     8/25/2017 0128 Blood culture Preliminary     8/24/2017 2141 Blood culture Preliminary     8/24/2017 2128 Blood culture Preliminary     8/24/2017 0721 Blood culture Preliminary             Statement of Approval     Ordered          08/28/17 8008  I have reviewed and agree with all the recommendations and orders detailed in this document.  EFFECTIVE NOW     Approved and electronically signed by:  Matheus Hickman MD             Admission Information     Date & Time Provider Department Dept. Phone    8/24/2017 Tania Sotelo MD [facility] [phone]      Your Vitals Were     Blood Pressure Temperature Respirations Height Weight Last Period    138/71 98.2  F (36.8  C) (Oral) 16 1.626 m (5' 4\") 68 kg (150 lb) 12/19/2013    Pulse Oximetry BMI (Body Mass Index)                99% 25.75 kg/m2          MyChart Information     [facility] gives you secure access to your electronic health record. If you see a primary care provider, you can also send messages to your care team and make appointments. If you have questions, please call your primary care clinic.  If you do not have a primary care provider, please call [phone] and they will assist you.        Care EveryWhere ID     This is your Care EveryWhere ID. This could be used by other organizations to access your " [facility] medical records  [id number]        Equal Access to Services     KEYANACA RUSS : Hadii aad ku hadalicerandy Tolentino, wajose de jesusviolet austin, irajmunir ruizmaviolet turner, albin stahlzackarynathalie allen. So [facility] [phone].    ATENCIÓN: Si habla español, tiene a rivera disposición servicios gratuitos de asistencia lingüística. Llame al [phone].    We comply with applicable federal civil rights laws and [place] laws. We do not discriminate on the basis of race, color, national origin, age, disability sex, sexual orientation or gender identity.               Review of your medicines      START taking        Dose / Directions    cefuroxime 500 MG tablet   Commonly known as:  CEFTIN   Indication:  Bacteria in the Blood        Dose:  500 mg   Take 1 tablet (500 mg) by mouth every 12 hours for 11 days   Quantity:  22 tablet   Refills:  0       lactobacillus Tabs        Dose:  1 tablet   Take 1 tablet by mouth 3 times daily for 14 days   Quantity:  42 tablet   Refills:  0         CONTINUE these medicines which may have CHANGED, or have new prescriptions. If we are uncertain of the size of tablets/capsules you have at home, strength may be listed as something that might have changed.        Dose / Directions    insulin glargine 100 UNIT/ML injection   Commonly known as:  LANTUS SOLOSTAR   This may have changed:  when to take this   Used for:  Post-pancreatectomy diabetes (H)        Dose:  4 Units   Inject 4 Units Subcutaneous every 24 hours   Quantity:  15 mL   Refills:  1         CONTINUE these medicines which have NOT CHANGED        Dose / Directions    ACETAMINOPHEN PO        Dose:  325 mg   Take 325 mg by mouth every 6 hours as needed for pain   Refills:  0       amylase-lipase-protease 64574 UNITS Tabs tablet   Commonly known as:  VIOKACE   Used for:  Pancreatic insufficiency        4-5 aps with meals and 1-2 with snacks   Quantity:  600 tablet   Refills:  5       blood glucose lancing device   Used for:   Absence of pancreas, acquired        Use to test blood sugars 6 times daily or as directed.   Quantity:  1 each   Refills:  11       blood glucose monitoring test strip   Commonly known as:  BILL CONTOUR NEXT   Used for:  Absence of pancreas, acquired        Check BS 4-6 times a day   Quantity:  2 Box   Refills:  6       * buprenorphine HCl-naloxone HCl 2-0.5 MG per film   Commonly known as:  SUBOXONE        Dose:  0.5 Film   Place 0.5 Film under the tongue every morning   Refills:  0       * buprenorphine HCl-naloxone HCl 2-0.5 MG per film   Commonly known as:  SUBOXONE        Dose:  0.25 Film   Place 0.25 Film under the tongue every evening   Refills:  0       busPIRone 15 MG tablet   Commonly known as:  BUSPAR   Used for:  Anxiety        Increased dose. 30 mg two times per day.   Quantity:  120 tablet   Refills:  3       CLONIDINE HCL PO        Dose:  0.1 mg   Take 0.1 mg by mouth 2 times daily as needed   Refills:  0       DOCUSATE SODIUM PO        Dose:  100 mg   Take 100 mg by mouth daily   Refills:  0       DULoxetine 30 MG EC capsule   Commonly known as:  CYMBALTA   Used for:  Anxiety        Dose:  30 mg   Take 1 capsule (30 mg) by mouth 2 times daily Fill at patient request.   Quantity:  180 capsule   Refills:  1       estradiol 0.1 MG/GM cream   Commonly known as:  ESTRACE VAGINAL   Used for:  Vaginal atrophy        Dose:  2 g   Place 2 g vaginally twice a week   Quantity:  42.5 g   Refills:  11       GABAPENTIN PO        Dose:  100 mg   Take 100 mg by mouth 3 times daily   Refills:  0       glucose 40 % Gel gel   Used for:  Chronic pancreatitis (H)        Dose:  15-30 g   Take 15-30 g by mouth every 15 minutes as needed.   Quantity:  1 Tube   Refills:  11       insulin pen needle 32G X 4 MM   Commonly known as:  BD MAHESH U/F   Used for:  Acquired absence of pancreas        Use up to 3 times daily as needed for insulin dosing   Quantity:  100 each   Refills:  prn       levothyroxine 75 MCG tablet    Commonly known as:  SYNTHROID/LEVOTHROID   Used for:  Acquired hypothyroidism        Dose:  75 mcg   Take 1 tablet (75 mcg) by mouth daily   Quantity:  90 tablet   Refills:  1       loratadine 10 MG tablet   Commonly known as:  CLARITIN   Indication:  Hayfever        Dose:  10 mg   Take 10 mg by mouth daily as needed   Refills:  0       modafinil 200 MG tablet   Commonly known as:  PROVIGIL        Dose:  2 tablet   Take 2 tablets by mouth Once a Day   Refills:  0       MOVANTIK 25 MG Tabs tablet   Generic drug:  naloxegol        Dose:  25 mg   Take 25 mg by mouth every morning (before breakfast)   Refills:  0       naloxone nasal spray   Commonly known as:  NARCAN        Dose:  4 mg   Spray 4 mg in nostril as needed for opioid reversal   Refills:  0       NovoLOG penFILL 100 UNITS/ML injection   Used for:  Diabetes mellitus type 1 (H)   Generic drug:  insulin aspart        Take as directed   Quantity:  3 Month   Refills:  1       NOVOPEN JR (GREEN) Genny   Generic drug:  Injection Device for insulin        Dose:  1 Device   1 Device Inject 1 unit (which is marked as a 1/2 unit on the pen itself)  as needed when eating carb heavy meals.   Refills:  0       omeprazole 40 MG capsule   Commonly known as:  priLOSEC   Used for:  Gastroesophageal reflux disease without esophagitis, Nausea, Gastric bypass status for obesity        Dose:  40 mg   Take 1 capsule (40 mg) by mouth 2 times daily Take 30-60 minutes before a meal.   Quantity:  180 capsule   Refills:  3       polyethylene glycol Packet   Commonly known as:  MIRALAX/GLYCOLAX   Used for:  Constipation        Dose:  17 g   Take 17 g by mouth daily   Quantity:  7 packet   Refills:  0       sennosides 8.6 MG tablet   Commonly known as:  SENOKOT   Used for:  Other constipation        Dose:  2 tablet   Take 2 tablets by mouth 2 times daily   Quantity:  120 tablet   Refills:  1       Sharps Container Misc   Used for:  Absence of pancreas, acquired        For insulin  needles   Quantity:  1 each   Refills:  prn       TRAZODONE HCL PO        Dose:  50 mg   Take 50 mg by mouth nightly as needed for sleep   Refills:  0       * Notice:  This list has 2 medication(s) that are the same as other medications prescribed for you. Read the directions carefully, and ask your doctor or other care provider to review them with you.      STOP taking     amoxicillin-clavulanate 875-125 MG per tablet   Commonly known as:  AUGMENTIN                Where to get your medicines      These medications were sent to [place], [place] - [address] [address] [address] [facility]  [address] [address]     Phone:  [phone]     cefuroxime 500 MG tablet         Some of these will need a paper prescription and others can be bought over the counter. Ask your nurse if you have questions.     Bring a paper prescription for each of these medications     lactobacillus Tabs                Protect others around you: Learn how to safely use, store and throw away your medicines at www.disposemymeds.org.             Medication List: This is a list of all your medications and when to take them. Check marks below indicate your daily home schedule. Keep this list as a reference.      Medications           Morning Afternoon Evening Bedtime As Needed    ACETAMINOPHEN PO   Take 325 mg by mouth every 6 hours as needed for pain   Last time this was given:  650 mg on 8/28/2017 12:15 AM                                amylase-lipase-protease 29340 UNITS Tabs tablet   Commonly known as:  VIOKACE   4-5 aps with meals and 1-2 with snacks   Last time this was given:  5 Units on 8/28/2017  1:03 PM                                blood glucose lancing device   Use to test blood sugars 6 times daily or as directed.                                blood glucose monitoring test strip   Commonly known as:  Able Device CONTOUR NEXT   Check BS 4-6 times a day                                * buprenorphine  HCl-naloxone HCl 2-0.5 MG per film   Commonly known as:  SUBOXONE   Place 0.5 Film under the tongue every morning   Last time this was given:  0.25 Film on 8/28/2017  8:48 AM                                * buprenorphine HCl-naloxone HCl 2-0.5 MG per film   Commonly known as:  SUBOXONE   Place 0.25 Film under the tongue every evening   Last time this was given:  0.25 Film on 8/28/2017  8:48 AM                                busPIRone 15 MG tablet   Commonly known as:  BUSPAR   Increased dose. 30 mg two times per day.   Last time this was given:  30 mg on 8/28/2017  8:44 AM                                cefuroxime 500 MG tablet   Commonly known as:  CEFTIN   Take 1 tablet (500 mg) by mouth every 12 hours for 11 days   Last time this was given:  500 mg on 8/28/2017  8:43 AM                                CLONIDINE HCL PO   Take 0.1 mg by mouth 2 times daily as needed                                DOCUSATE SODIUM PO   Take 100 mg by mouth daily                                DULoxetine 30 MG EC capsule   Commonly known as:  CYMBALTA   Take 1 capsule (30 mg) by mouth 2 times daily Fill at patient request.   Last time this was given:  30 mg on 8/28/2017  8:44 AM                                estradiol 0.1 MG/GM cream   Commonly known as:  ESTRACE VAGINAL   Place 2 g vaginally twice a week                                GABAPENTIN PO   Take 100 mg by mouth 3 times daily   Last time this was given:  100 mg on 8/28/2017  2:01 PM                                glucose 40 % Gel gel   Take 15-30 g by mouth every 15 minutes as needed.                                insulin glargine 100 UNIT/ML injection   Commonly known as:  LANTUS SOLOSTAR   Inject 4 Units Subcutaneous every 24 hours                                insulin pen needle 32G X 4 MM   Commonly known as:  BD MAHESH U/F   Use up to 3 times daily as needed for insulin dosing                                lactobacillus Tabs   Take 1 tablet by mouth 3 times daily for  14 days                                levothyroxine 75 MCG tablet   Commonly known as:  SYNTHROID/LEVOTHROID   Take 1 tablet (75 mcg) by mouth daily   Last time this was given:  75 mcg on 8/28/2017  8:44 AM                                loratadine 10 MG tablet   Commonly known as:  CLARITIN   Take 10 mg by mouth daily as needed                                modafinil 200 MG tablet   Commonly known as:  PROVIGIL   Take 2 tablets by mouth Once a Day   Last time this was given:  400 mg on 8/28/2017  8:44 AM                                MOVANTIK 25 MG Tabs tablet   Take 25 mg by mouth every morning (before breakfast)   Last time this was given:  25 mg on 8/28/2017  7:06 AM   Generic drug:  naloxegol                                naloxone nasal spray   Commonly known as:  NARCAN   Spray 4 mg in nostril as needed for opioid reversal                                NovoLOG penFILL 100 UNITS/ML injection   Take as directed   Generic drug:  insulin aspart                                NOVOPEN JR (GREEN) Genny   1 Device Inject 1 unit (which is marked as a 1/2 unit on the pen itself)  as needed when eating carb heavy meals.   Generic drug:  Injection Device for insulin                                omeprazole 40 MG capsule   Commonly known as:  priLOSEC   Take 1 capsule (40 mg) by mouth 2 times daily Take 30-60 minutes before a meal.   Last time this was given:  40 mg on 8/28/2017  8:44 AM                                polyethylene glycol Packet   Commonly known as:  MIRALAX/GLYCOLAX   Take 17 g by mouth daily   Last time this was given:  17 g on 8/25/2017 11:01 AM                                sennosides 8.6 MG tablet   Commonly known as:  SENOKOT   Take 2 tablets by mouth 2 times daily   Last time this was given:  2 tablets on 8/26/2017  7:46 PM                                Sharps Container Misc   For insulin needles                                TRAZODONE HCL PO   Take 50 mg by mouth nightly as needed for sleep    Last time this was given:  50 mg on 8/28/2017 12:15 AM                                * Notice:  This list has 2 medication(s) that are the same as other medications prescribed for you. Read the directions carefully, and ask your doctor or other care provider to review them with you.

## 2017-08-24 NOTE — ED AVS SNAPSHOT
[facility] Emergency Department    [address]    [facility] [address]    Phone:  [phone]    Fax:  [phone]                                       Lynn Thompson   MRN: [id number]    Department:  [facility] Emergency Department   Date of Visit:  8/24/2017           Patient Information     Date Of Birth          1965        Your diagnoses for this visit were:     Acute sinusitis, recurrence not specified, unspecified location     Fever, unspecified        You were seen by Alonzo Toure MD.      Follow-up Information     Follow up with Maryana Brooks MD. Schedule an appointment as soon as possible for a visit in 1 day.    Specialty:  Family Practice    Why:  As needed, If symptoms worsen    Contact information:    [address]  [facility] [address]  [phone]          Discharge Instructions         Acute Sinusitis    Acute sinusitis is irritation and swelling of the sinuses. It is usually caused by a viral infection after a common cold. Your doctor can help you find relief.  What is acute sinusitis?  Sinuses are air-filled spaces in the skull behind the face. They are kept moist and clean by a lining of mucosa. Things such as pollen, smoke, and chemical fumes can irritate the mucosa. It can then swell up. As a response to irritation, the mucosa makes more mucus and other fluids. Tiny hairlike cilia cover the mucosa. Cilia help carry mucus toward the opening of the sinus. Too much mucus may cause the cilia to stop working. This blocks the sinus opening. A buildup of fluid in the sinuses then causes pain and pressure. It can also encourage bacteria to grow in the sinuses.  Common symptoms of acute sinusitis  You may have:    Facial soreness pain    Headache    Fever    Fluid draining in the back of the throat (postnasal drip)    Congestion    Drainage that is thick and colored, instead of clear    Cough  Diagnosing acute sinusitis  Your doctor  will ask about your symptoms and health history. He or she will look at your ear, nose, and throat. You usually won't need to have X-rays taken.    The doctor may take a sample of mucus to check for bacteria. If you have sinusitis that keeps coming back, you may need imaging tests such as X-rays or CAT scans. This will help your doctor check for a structural problem that may be causing the infection.  Treating acute sinusitis  Treatment is aimed at unblocking the sinus opening and helping the cilia work again. You may need to take antihistamine and decongestant medicine. These can reduce inflammation and decrease the amount of fluid your sinuses make. If you have a bacterial infection, you will need to take antibiotic medicine for 10 to 14 days. Take this medicine until it is gone, even if you feel better.  Date Last Reviewed: 10/1/2016    5133-6166 [employer]. [address] [place] [address]. All rights reserved. This information is not intended as a substitute for professional medical care. Always follow your healthcare professional's instructions.          Discharge References/Attachments     FEVER CONTROL (ADULT) (ENGLISH)      Future Appointments        Provider Department Dept Phone Center    9/7/2017 10:00 AM Adriana Robles MD [facility] [phone] [facility]      24 Hour Appointment Hotline       To make an appointment at any [facility], call [phone] ([phone]). If you don't have a family doctor or clinic, we will help you find one. [facility] are conveniently located to serve the needs of you and your family.             Review of your medicines      START taking        Dose / Directions Last dose taken    amoxicillin-clavulanate 875-125 MG per tablet   Commonly known as:  AUGMENTIN   Dose:  1 tablet   Quantity:  14 tablet        Take 1 tablet by mouth 2 times daily for 7 days   Refills:  0          Our records show that you are taking  the medicines listed below. If these are incorrect, please call your family doctor or clinic.        Dose / Directions Last dose taken    amylase-lipase-protease 62012 UNITS Tabs tablet   Commonly known as:  VIOKACE   Quantity:  600 tablet        4-5 aps with meals and 1-2 with snacks   Refills:  5        blood glucose lancing device   Quantity:  1 each        Use to test blood sugars 6 times daily or as directed.   Refills:  11        blood glucose monitoring test strip   Commonly known as:  BILL CONTOUR NEXT   Quantity:  2 Box        Check BS 4-6 times a day   Refills:  6        BOOST HIGH PROTEIN Liqd        After above baseline labs are drawn, give: 6 mL/kg to maximum of 360 mL; the beverage is to be consumed within 5 minutes.   Refills:  0        buprenorphine HCl-naloxone HCl 2-0.5 MG per film   Commonly known as:  SUBOXONE   Dose:  1 Film        Place 1 Film under the tongue daily Pt takes 1/2 bid   Refills:  0        busPIRone 15 MG tablet   Commonly known as:  BUSPAR   Quantity:  120 tablet        Increased dose. 30 mg two times per day.   Refills:  3        docusate 50 MG/5ML liquid   Commonly known as:  COLACE   Dose:  100 mg        Take 100 mg by mouth 2 times daily as needed for constipation   Refills:  0        DULoxetine 30 MG EC capsule   Commonly known as:  CYMBALTA   Dose:  30 mg   Quantity:  180 capsule        Take 1 capsule (30 mg) by mouth 2 times daily Fill at patient request.   Refills:  1        estradiol 0.1 MG/GM cream   Commonly known as:  ESTRACE VAGINAL   Dose:  2 g   Quantity:  42.5 g        Place 2 g vaginally twice a week   Refills:  11        estrogen (conjugated)-medroxyPROGESTERone 0.3-1.5 MG per tablet   Commonly known as:  PREMPRO   Dose:  1 tablet   Quantity:  90 tablet        Take 1 tablet by mouth daily   Refills:  3        fluconazole 150 MG tablet   Commonly known as:  DIFLUCAN   Quantity:  2 tablet        Take 1 tab with onset of symptoms, then repeat dose 3 days later    Refills:  0        gabapentin 100 MG capsule   Commonly known as:  NEURONTIN   Dose:  200 mg   Quantity:  180 capsule        Take 2 capsules (200 mg) by mouth 3 times daily   Refills:  3        glucose 40 % Gel gel   Dose:  15-30 g   Quantity:  1 Tube        Take 15-30 g by mouth every 15 minutes as needed.   Refills:  11        insulin glargine 100 UNIT/ML injection   Commonly known as:  LANTUS SOLOSTAR   Dose:  4 Units   Quantity:  15 mL        Inject 4 Units Subcutaneous every 24 hours   Refills:  1        insulin pen needle 32G X 4 MM   Commonly known as:  BD MAHESH U/F   Quantity:  100 each        Use up to 3 times daily as needed for insulin dosing   Refills:  prn        levothyroxine 75 MCG tablet   Commonly known as:  SYNTHROID/LEVOTHROID   Dose:  75 mcg   Quantity:  90 tablet        Take 1 tablet (75 mcg) by mouth daily   Refills:  1        loratadine 10 MG tablet   Commonly known as:  CLARITIN   Dose:  10 mg   Indication:  Hayfever        Take 10 mg by mouth daily   Refills:  0        modafinil 200 MG tablet   Commonly known as:  PROVIGIL   Dose:  2 tablet        Take 2 tablets by mouth Once a Day   Refills:  0        multivitamin CF formula Liqd liquid   Dose:  2.5 mL   Quantity:  75 mL        Take 2.5 mLs by mouth daily   Refills:  3        nitroFURantoin (macrocrystal-monohydrate) 100 MG capsule   Commonly known as:  MACROBID   Dose:  100 mg   Quantity:  14 capsule        Take 1 capsule (100 mg) by mouth 2 times daily   Refills:  0        NovoLOG penFILL 100 UNITS/ML injection   Quantity:  3 Month   Generic drug:  insulin aspart        Take as directed   Refills:  1        ELKE Royal (GREEN)   Dose:  1 Device   Generic drug:  Injection Device for insulin        1 Device Inject 1 unit as needed for blood glucose above 140 mg/dL.   Refills:  0        omeprazole 40 MG capsule   Commonly known as:  priLOSEC   Dose:  40 mg   Quantity:  180 capsule        Take 1 capsule (40 mg) by mouth 2 times daily  Take 30-60 minutes before a meal.   Refills:  3        polyethylene glycol Packet   Commonly known as:  MIRALAX/GLYCOLAX   Dose:  17 g   Quantity:  7 packet        Take 17 g by mouth daily   Refills:  0        sennosides 8.6 MG tablet   Commonly known as:  SENOKOT   Dose:  2 tablet   Quantity:  120 tablet        Take 2 tablets by mouth 2 times daily   Refills:  1        Sharps Container Misc   Quantity:  1 each        For insulin needles   Refills:  prn        traZODone 50 MG tablet   Commonly known as:  DESYREL   Dose:  50 mg   Quantity:  90 tablet        Take 1 tablet (50 mg) by mouth At Bedtime Fill at patient request.   Refills:  1                Prescriptions were sent or printed at these locations (1 Prescription)                   Other Prescriptions                Printed at Department/Unit printer (1 of 1)         amoxicillin-clavulanate (AUGMENTIN) 875-125 MG per tablet                Procedures and tests performed during your visit     Blood culture    CBC with platelets differential    CT Abdomen Pelvis w IV contrast only (Trauma/AAA)    Comprehensive metabolic panel    Glucose by meter    Lactic acid whole blood    UA with Microscopic    Urine Culture Aerobic Bacterial    XR Chest 2 Views      Orders Needing Specimen Collection     None      Pending Results     Date and Time Order Name Status Description    8/24/2017 0627 CT Abdomen Pelvis w IV contrast only (Trauma/AAA) Preliminary     8/24/2017 0559 Blood culture In process     8/24/2017 0559 Urine Culture Aerobic Bacterial In process             Pending Culture Results     Date and Time Order Name Status Description    8/24/2017 0559 Blood culture In process     8/24/2017 0559 Urine Culture Aerobic Bacterial In process             Pending Results Instructions     If you had any lab results that were not finalized at the time of your Discharge, you can call the ED Lab Result RN at [phone]. You will be contacted by this team for any positive Lab  results or changes in treatment. The nurses are available 7 days a week from 10A to 6:30P.  You can leave a message 24 hours per day and they will return your call.        Test Results From Your Hospital Stay        8/24/2017  6:59 AM      Component Results     Component Value Ref Range & Units Status    WBC 13.2 (H) 4.0 - 11.0 10e9/L Final    RBC Count 4.58 3.8 - 5.2 10e12/L Final    Hemoglobin 13.7 11.7 - 15.7 g/dL Final    Hematocrit 42.4 35.0 - 47.0 % Final    MCV 93 78 - 100 fl Final    MCH 29.9 26.5 - 33.0 pg Final    MCHC 32.3 31.5 - 36.5 g/dL Final    RDW 13.0 10.0 - 15.0 % Final    Platelet Count 354 150 - 450 10e9/L Final    Diff Method Automated Method  Final    % Neutrophils 87.4 % Final    % Lymphocytes 4.5 % Final    % Monocytes 6.6 % Final    % Eosinophils 0.5 % Final    % Basophils 0.5 % Final    % Immature Granulocytes 0.5 % Final    Nucleated RBCs 0 0 /100 Final    Absolute Neutrophil 11.6 (H) 1.6 - 8.3 10e9/L Final    Absolute Lymphocytes 0.6 (L) 0.8 - 5.3 10e9/L Final    Absolute Monocytes 0.9 0.0 - 1.3 10e9/L Final    Absolute Eosinophils 0.1 0.0 - 0.7 10e9/L Final    Absolute Basophils 0.1 0.0 - 0.2 10e9/L Final    Abs Immature Granulocytes 0.1 0 - 0.4 10e9/L Final    Absolute Nucleated RBC 0.0  Final    RBC Morphology   Final    Consistent with reported results    Platelet Estimate Normal  Final         8/24/2017  6:48 AM      Component Results     Component Value Ref Range & Units Status    Sodium 140 133 - 144 mmol/L Final    Potassium 4.1 3.4 - 5.3 mmol/L Final    Chloride 103 94 - 109 mmol/L Final    Carbon Dioxide 32 20 - 32 mmol/L Final    Anion Gap 5 3 - 14 mmol/L Final    Glucose 83 70 - 99 mg/dL Final    Urea Nitrogen 18 7 - 30 mg/dL Final    Creatinine 0.75 0.52 - 1.04 mg/dL Final    GFR Estimate 80 >60 mL/min/1.7m2 Final    Non  GFR Calc    GFR Estimate If Black >90 >60 mL/min/1.7m2 Final    African American GFR Calc    Calcium 8.7 8.5 - 10.1 mg/dL Final    Bilirubin  Total 0.4 0.2 - 1.3 mg/dL Final    Albumin 3.6 3.4 - 5.0 g/dL Final    Protein Total 7.6 6.8 - 8.8 g/dL Final    Alkaline Phosphatase 289 (H) 40 - 150 U/L Final     (H) 0 - 50 U/L Final     (H) 0 - 45 U/L Final         8/24/2017  6:24 AM      Component Results     Component Value Ref Range & Units Status    Lactic Acid 1.3 0.7 - 2.0 mmol/L Final         8/24/2017  7:29 AM      Component Results     Component Value Ref Range & Units Status    Color Urine Yellow  Final    Appearance Urine Clear  Final    Glucose Urine Negative NEG^Negative mg/dL Final    Bilirubin Urine Negative NEG^Negative Final    Ketones Urine Negative NEG^Negative mg/dL Final    Specific Gravity Urine 1.021 1.003 - 1.035 Final    Blood Urine Negative NEG^Negative Final    pH Urine 6.0 5.0 - 7.0 pH Final    Protein Albumin Urine Negative NEG^Negative mg/dL Final    Urobilinogen mg/dL 0.0 0.0 - 2.0 mg/dL Final    Nitrite Urine Negative NEG^Negative Final    Leukocyte Esterase Urine Negative NEG^Negative Final    Source Midstream Urine  Final    WBC Urine 1 0 - 2 /HPF Final    RBC Urine 3 (H) 0 - 2 /HPF Final    Squamous Epithelial /HPF Urine 1 0 - 1 /HPF Final         8/24/2017  7:21 AM         8/24/2017  6:20 AM         8/24/2017  6:10 AM      Component Results     Component Value Ref Range & Units Status    Glucose 80 70 - 99 mg/dL Final         8/24/2017  7:16 AM      Narrative     XR CHEST 2 VW  8/24/2017 7:12 AM    HISTORY:  fever    COMPARISON:  2/21/2015        Impression     IMPRESSION:  Negative.     SINDY LOPEZ MD         8/24/2017  7:14 AM      Narrative     CT ABDOMEN AND PELVIS WITH CONTRAST 8/24/2017 7:06 AM     HISTORY: Fever, abdominal pain, back pain.    COMPARISON: 5/10/2016    TECHNIQUE: Volumetric helical acquisition of CT images from the lung  bases through the symphysis pubis after the administration of 71 mL  Isovue-370  intravenous contrast. Radiation dose for this scan was  reduced using automated exposure  control, adjustment of the mA and/or  kV according to patient size, or iterative reconstruction technique.    FINDINGS: Pancreas, spleen, and a portion of the stomach are  surgically absent. Adrenal glands, kidneys, and liver demonstrate no  worrisome focal lesion. Large amount of stool throughout the colon.  There are no dilated loops of small intestine or large bowel to  suggest ileus or obstruction. No free air or free fluid. No  hydronephrosis. Stable focal sclerosis with a nonaggressive appearance  in the right ilium. Survey of the visualized bony structures  demonstrates no destructive bony lesions. Nonaneurysmal aorta. No  diverticulitis. The visualized lung bases are unremarkable.        Impression     IMPRESSION: No acute process demonstrated in the abdomen and pelvis.                 Clinical Quality Measure: Blood Pressure Screening     Your blood pressure was checked while you were in the emergency department today. The last reading we obtained was  BP: 156/90 . Please read the guidelines below about what these numbers mean and what you should do about them.  If your systolic blood pressure (the top number) is less than 120 and your diastolic blood pressure (the bottom number) is less than 80, then your blood pressure is normal. There is nothing more that you need to do about it.  If your systolic blood pressure (the top number) is 120-139 or your diastolic blood pressure (the bottom number) is 80-89, your blood pressure may be higher than it should be. You should have your blood pressure rechecked within a year by a primary care provider.  If your systolic blood pressure (the top number) is 140 or greater or your diastolic blood pressure (the bottom number) is 90 or greater, you may have high blood pressure. High blood pressure is treatable, but if left untreated over time it can put you at risk for heart attack, stroke, or kidney failure. You should have your blood pressure rechecked by a primary care  provider within the next 4 weeks.  If your provider in the emergency department today gave you specific instructions to follow-up with your doctor or provider even sooner than that, you should follow that instruction and not wait for up to 4 weeks for your follow-up visit.        Thank you for choosing [facility]       Thank you for choosing [facility] for your care. Our goal is always to provide you with excellent care. Hearing back from our patients is one way we can continue to improve our services. Please take a few minutes to complete the written survey that you may receive in the mail after you visit with us. Thank you!        [employer] Information     [employer] gives you secure access to your electronic health record. If you see a primary care provider, you can also send messages to your care team and make appointments. If you have questions, please call your primary care clinic.  If you do not have a primary care provider, please call [phone] and they will assist you.        Care EveryWhere ID     This is your Care EveryWhere ID. This could be used by other organizations to access your [facility] medical records  [id number]        Equal Access to Services     CHAD SOLANO : Hadii kapil mandujano Soyandel, waaxda luqadaha, qaybta kaalmaviolet turner, albin main . So [facility] [phone].    ATENCIÓN: Si habla español, tiene a rivera disposición servicios gratuitos de asistencia lingüística. Llame al [phone].    We comply with applicable federal civil rights laws and [place] laws. We do not discriminate on the basis of race, color, national origin, age, disability sex, sexual orientation or gender identity.            After Visit Summary       This is your record. Keep this with you and show to your community pharmacist(s) and doctor(s) at your next visit.

## 2017-08-24 NOTE — IP AVS SNAPSHOT
[facility]    [address]    [facility] [address]    Phone:  [phone]    Fax:  [phone]                                       After Visit Summary   8/24/2017    Lynn Thompson    MRN: [id number]           After Visit Summary Signature Page     I have received my discharge instructions, and my questions have been answered. I have discussed any challenges I see with this plan with the nurse or doctor.    ..........................................................................................................................................  Patient/Patient Representative Signature      ..........................................................................................................................................  Patient Representative Print Name and Relationship to Patient    ..................................................               ................................................  Date                                            Time    ..........................................................................................................................................  Reviewed by Signature/Title    ...................................................              ..............................................  Date                                                            Time

## 2017-08-24 NOTE — DISCHARGE INSTRUCTIONS
Acute Sinusitis    Acute sinusitis is irritation and swelling of the sinuses. It is usually caused by a viral infection after a common cold. Your doctor can help you find relief.  What is acute sinusitis?  Sinuses are air-filled spaces in the skull behind the face. They are kept moist and clean by a lining of mucosa. Things such as pollen, smoke, and chemical fumes can irritate the mucosa. It can then swell up. As a response to irritation, the mucosa makes more mucus and other fluids. Tiny hairlike cilia cover the mucosa. Cilia help carry mucus toward the opening of the sinus. Too much mucus may cause the cilia to stop working. This blocks the sinus opening. A buildup of fluid in the sinuses then causes pain and pressure. It can also encourage bacteria to grow in the sinuses.  Common symptoms of acute sinusitis  You may have:    Facial soreness pain    Headache    Fever    Fluid draining in the back of the throat (postnasal drip)    Congestion    Drainage that is thick and colored, instead of clear    Cough  Diagnosing acute sinusitis  Your doctor will ask about your symptoms and health history. He or she will look at your ear, nose, and throat. You usually won't need to have X-rays taken.    The doctor may take a sample of mucus to check for bacteria. If you have sinusitis that keeps coming back, you may need imaging tests such as X-rays or CAT scans. This will help your doctor check for a structural problem that may be causing the infection.  Treating acute sinusitis  Treatment is aimed at unblocking the sinus opening and helping the cilia work again. You may need to take antihistamine and decongestant medicine. These can reduce inflammation and decrease the amount of fluid your sinuses make. If you have a bacterial infection, you will need to take antibiotic medicine for 10 to 14 days. Take this medicine until it is gone, even if you feel better.  Date Last Reviewed: 10/1/2016    1229-9414 [employer] [address] [place] [address]. All rights reserved. This information is not intended as a substitute for professional medical care. Always follow your healthcare professional's instructions.

## 2017-08-24 NOTE — ED PROVIDER NOTES
History     Chief Complaint:  Fever    HPI   Lynn Thompson is a 52 year old female who presents with a fever. The patient reports that she began experiencing abdominal pain radiating into her back and generally felt ill last evening. She awoke this morning with these symptoms persisting and measured her temperature at 102.5. Her abdomen was distended last evening but is no longer at this time. While here in the ED she denies any cough, urinary symptoms, rash, or stool issues. The patient does note a history of kidney stones and intestinal blockages.     Allergies:  Povidone iodine  Corticosteroids  Nsaids  Sulfasalazine     Medications:    Buspar  Viokace  Lantus solostar  Synthroid  Macrobid  Diflucan  Suboxone  Desyrel  Cymbalta  Prilosec  Estradiol  Prempro  Senokot  Colace  Neurontin  Claritin  Miralax  Provigil    Past Medical History:    Abdominal pain, epigastric  Benign paroxysmal positional vertigo  Calculus of kidney  Chronic abdominal pain  Depressive disorder  Chronic pancreatitis  Diabetes  Dyspepsia and other specified disorders of function of stomach  Headache  Hypertension  Iron deficiency anemia secondary to inadequate dietary iron intake  Obesity  Other chronic pain  Pancreatic disease  Post-pancreatectomy diabetes  Pyelonephritis  Regional enteritis of unspecified site  Sleep apnea  Spasm of sphincter of Oddi  Thyroid nodule  Uteral calculus  Vaccination not carried out    Past Surgical History:    Appendectomy   Right bunion procedure  Tonsillectomy and adenoidectomy  Partial ileum resection  Right ovarian cyst  C section  cholecystectomy  CBD stent  Colonoscopy  Gastric bypass x2  Combined cystoscopy, retrogrades, uteroscopy, laser holmium lithotripsy  Tummy tu cosmetic surgery  Cystoscopy, retrogrades, insert stent ureters  Ent surgery  Extracorporeal shock wave lithotripsy  HC Lap lysis of adhesions  Hernia repair  Small bowel obstruction surgery  Pancreatomy, transplant auto islet  cell  Transplant    Family History:    COPD  Kidney stones  Substance abuse  Depression  Asthma  Anxiety disorder    Social History:  Smoking Status: No  Smokeless Tobacco: No  Alcohol Use: No  Marital Status:       Review of Systems   Constitutional: Positive for chills and fever.   HENT: Positive for sinus pressure and sore throat.    Respiratory: Negative for cough.    Gastrointestinal: Positive for abdominal pain. Negative for abdominal distention, anal bleeding, blood in stool, constipation, diarrhea, nausea and vomiting.   Genitourinary: Negative for dysuria and hematuria.   Skin: Negative for rash.     Physical Exam   Vitals:  Patient Vitals for the past 24 hrs:   BP Temp Temp src Heart Rate Resp SpO2 Weight   08/24/17 0815 - - - - - 96 % -   08/24/17 0800 125/71 - - - - 97 % -   08/24/17 0745 130/69 - - - - 98 % -   08/24/17 0730 137/76 - - - - 100 % -   08/24/17 0550 156/90 - - - - - -   08/24/17 0548 - 102.6  F (39.2  C) Temporal 101 20 99 % 63.5 kg (140 lb)     Physical Exam  Constitutional: Patient is well appearing. No distress.  Head: Atraumatic.  Mouth/Throat: Oropharynx is clear and moist. No oropharyngeal exudate.  Eyes: Conjunctivae and EOM are normal. No scleral icterus.  Neck: Normal range of motion. Neck supple.   Cardiovascular: Normal rate, regular rhythm, normal heart sounds and intact distal pulses.   Pulmonary/Chest: Breath sounds normal. No respiratory distress.  Abdominal: Soft. Bowel sounds are normal. No distension. No tenderness. No rebound or guarding. Midline scar noted.  Musculoskeletal: Normal range of motion. No edema or tenderness.   Neurological: Alert and orientated to person, place, and time. No observable focal neuro deficit  Skin: Warm and dry. No rash noted. Not diaphoretic.     Emergency Department Course     Imaging:  Radiology findings were communicated with the patient who voiced understanding of the findings.  XR Chest 2 views:  Negative  Per Radiologist.     CT  Abdomen Pelvis w IV contrast:  No acute process demonstrated in the abdomen and pelvis.   Per Radiologist.     Laboratory:  Laboratory findings were communicated with the patient who voiced understanding of the findings.  Blood culture: Pending  Glucose by meter: (Collected 0605) 80  CBC: WBC: 13.2 (H) o/w WNL. (HGB 13.7, )   CMP: ALK Phos: 289 (H), ALT: 248 (H), AST: 329 (H) o/w WNL (Creatinine 0.75)  Lactic acid: 1.3  UA: RBC: 3 (H)  Urine culture: Pending    Interventions:  0643 Normal Saline 1000 mL IV     Emergency Department Course:  Nursing notes and vitals reviewed.  0615 I had my initial encounter with the patient.  I performed an exam of the patient as documented above.   IV was inserted and blood was drawn for laboratory testing, results above.  The patient provided a urine sample here in the emergency department. This was sent for laboratory testing, findings above.  The patient was sent for a XR and CT while in the emergency department, results above.   0822 The patient was updated on their plan of care.    I discussed the treatment plan with the patient. They expressed understanding of this plan and consented to discharge. They will be discharged home with instructions for care and follow up. In addition, the patient will return to the emergency department if their symptoms persist, worsen, if new symptoms arise or if there is any concern.  All questions were answered.    Impression & Plan      Medical Decision Making:  Considering comorbidities extensive screening workup as above.  No apparent surgical or medical emergency identified.  Has sinus sx covered empiric.  Has chronic elevation of alk phos and lft however pain free at dc no red flags on abd exam x2 and tolerating po.  Has f/u plan with GI transplant specialist tomorrow.      Diagnosis:    ICD-10-CM    1. Acute sinusitis, recurrence not specified, unspecified location J01.90    2. Fever, unspecified R50.9      Disposition:    Discharge    Discharge Medications:  New Prescriptions    AMOXICILLIN-CLAVULANATE (AUGMENTIN) 875-125 MG PER TABLET    Take 1 tablet by mouth 2 times daily for 7 days    FLUCONAZOLE (DIFLUCAN) 150 MG TABLET    Take 1 tablet (150 mg) by mouth once for 1 dose     Scribe Disclosure:  I, Rafa Gordon, am serving as a scribe at 6:17 AM on 8/24/2017 to document services personally performed by Alonzo Toure MD, based on my observations and the provider's statements to me.    8/24/2017   [facility] EMERGENCY DEPARTMENT       Alonzo Toure MD  08/24/17 8479

## 2017-08-25 ENCOUNTER — APPOINTMENT (OUTPATIENT)
Dept: ULTRASOUND IMAGING | Facility: CLINIC | Age: 52
End: 2017-08-25
Attending: EMERGENCY MEDICINE
Payer: COMMERCIAL

## 2017-08-25 PROBLEM — R78.81 E COLI BACTEREMIA: Status: ACTIVE | Noted: 2017-08-25

## 2017-08-25 PROBLEM — B96.20 E COLI BACTEREMIA: Status: ACTIVE | Noted: 2017-08-25

## 2017-08-25 LAB
ALBUMIN SERPL-MCNC: 2.8 G/DL (ref 3.4–5)
ALP SERPL-CCNC: 229 U/L (ref 40–150)
ALT SERPL W P-5'-P-CCNC: 233 U/L (ref 0–50)
AST SERPL W P-5'-P-CCNC: 202 U/L (ref 0–45)
BACTERIA SPEC CULT: NORMAL
BILIRUB DIRECT SERPL-MCNC: <0.1 MG/DL (ref 0–0.2)
BILIRUB SERPL-MCNC: 0.4 MG/DL (ref 0.2–1.3)
GLUCOSE BLDC GLUCOMTR-MCNC: 106 MG/DL (ref 70–99)
Lab: NORMAL
PROT SERPL-MCNC: 6 G/DL (ref 6.8–8.8)
SPECIMEN SOURCE: NORMAL

## 2017-08-25 PROCEDURE — 25000131 ZZH RX MED GY IP 250 OP 636 PS 637

## 2017-08-25 PROCEDURE — 00000146 ZZHCL STATISTIC GLUCOSE BY METER IP

## 2017-08-25 PROCEDURE — 99223 1ST HOSP IP/OBS HIGH 75: CPT | Mod: AI | Performed by: HOSPITALIST

## 2017-08-25 PROCEDURE — 87040 BLOOD CULTURE FOR BACTERIA: CPT | Performed by: HOSPITALIST

## 2017-08-25 PROCEDURE — 80076 HEPATIC FUNCTION PANEL: CPT | Performed by: HOSPITALIST

## 2017-08-25 PROCEDURE — 36415 COLL VENOUS BLD VENIPUNCTURE: CPT | Performed by: HOSPITALIST

## 2017-08-25 PROCEDURE — 25000132 ZZH RX MED GY IP 250 OP 250 PS 637: Performed by: HOSPITALIST

## 2017-08-25 PROCEDURE — 12000000 ZZH R&B MED SURG/OB

## 2017-08-25 PROCEDURE — 25000128 H RX IP 250 OP 636: Performed by: HOSPITALIST

## 2017-08-25 PROCEDURE — 25000132 ZZH RX MED GY IP 250 OP 250 PS 637

## 2017-08-25 PROCEDURE — 96374 THER/PROPH/DIAG INJ IV PUSH: CPT

## 2017-08-25 PROCEDURE — 76705 ECHO EXAM OF ABDOMEN: CPT

## 2017-08-25 PROCEDURE — 25000132 ZZH RX MED GY IP 250 OP 250 PS 637: Performed by: INTERNAL MEDICINE

## 2017-08-25 RX ORDER — BUPRENORPHINE AND NALOXONE 2; .5 MG/1; MG/1
0.25 FILM, SOLUBLE BUCCAL; SUBLINGUAL EVERY EVENING
Status: DISCONTINUED | OUTPATIENT
Start: 2017-08-25 | End: 2017-08-25 | Stop reason: CLARIF

## 2017-08-25 RX ORDER — BUPRENORPHINE AND NALOXONE 2; .5 MG/1; MG/1
0.5 FILM, SOLUBLE BUCCAL; SUBLINGUAL EVERY MORNING
Status: DISCONTINUED | OUTPATIENT
Start: 2017-08-26 | End: 2017-08-28 | Stop reason: HOSPADM

## 2017-08-25 RX ORDER — POLYETHYLENE GLYCOL 3350 17 G/17G
17 POWDER, FOR SOLUTION ORAL DAILY
Status: DISCONTINUED | OUTPATIENT
Start: 2017-08-25 | End: 2017-08-27 | Stop reason: CLARIF

## 2017-08-25 RX ORDER — ACETAMINOPHEN 325 MG/1
650 TABLET ORAL EVERY 4 HOURS PRN
Status: DISCONTINUED | OUTPATIENT
Start: 2017-08-25 | End: 2017-08-28 | Stop reason: HOSPADM

## 2017-08-25 RX ORDER — LORATADINE 10 MG/1
10 TABLET ORAL DAILY PRN
Status: DISCONTINUED | OUTPATIENT
Start: 2017-08-25 | End: 2017-08-28 | Stop reason: HOSPADM

## 2017-08-25 RX ORDER — BUPRENORPHINE AND NALOXONE 2; .5 MG/1; MG/1
0.5 FILM, SOLUBLE BUCCAL; SUBLINGUAL EVERY MORNING
Status: DISCONTINUED | OUTPATIENT
Start: 2017-08-26 | End: 2017-08-25 | Stop reason: CLARIF

## 2017-08-25 RX ORDER — BUPRENORPHINE AND NALOXONE 2; .5 MG/1; MG/1
0.25 FILM, SOLUBLE BUCCAL; SUBLINGUAL EVERY EVENING
Status: DISCONTINUED | OUTPATIENT
Start: 2017-08-25 | End: 2017-08-28 | Stop reason: HOSPADM

## 2017-08-25 RX ORDER — POTASSIUM CHLORIDE 7.45 MG/ML
10 INJECTION INTRAVENOUS
Status: DISCONTINUED | OUTPATIENT
Start: 2017-08-25 | End: 2017-08-28 | Stop reason: HOSPADM

## 2017-08-25 RX ORDER — BUSPIRONE HYDROCHLORIDE 10 MG/1
30 TABLET ORAL 2 TIMES DAILY
Status: DISCONTINUED | OUTPATIENT
Start: 2017-08-25 | End: 2017-08-28 | Stop reason: HOSPADM

## 2017-08-25 RX ORDER — LEVOTHYROXINE SODIUM 75 UG/1
75 TABLET ORAL DAILY
Status: DISCONTINUED | OUTPATIENT
Start: 2017-08-25 | End: 2017-08-28 | Stop reason: HOSPADM

## 2017-08-25 RX ORDER — SODIUM CHLORIDE 9 MG/ML
INJECTION, SOLUTION INTRAVENOUS CONTINUOUS
Status: DISCONTINUED | OUTPATIENT
Start: 2017-08-25 | End: 2017-08-28 | Stop reason: HOSPADM

## 2017-08-25 RX ORDER — SENNOSIDES 8.6 MG
2 TABLET ORAL 2 TIMES DAILY
Status: DISCONTINUED | OUTPATIENT
Start: 2017-08-25 | End: 2017-08-27 | Stop reason: CLARIF

## 2017-08-25 RX ORDER — MODAFINIL 200 MG/1
400 TABLET ORAL DAILY
Status: DISCONTINUED | OUTPATIENT
Start: 2017-08-25 | End: 2017-08-28 | Stop reason: HOSPADM

## 2017-08-25 RX ORDER — DEXTROSE MONOHYDRATE 25 G/50ML
25-50 INJECTION, SOLUTION INTRAVENOUS
Status: DISCONTINUED | OUTPATIENT
Start: 2017-08-25 | End: 2017-08-28 | Stop reason: HOSPADM

## 2017-08-25 RX ORDER — DULOXETIN HYDROCHLORIDE 30 MG/1
30 CAPSULE, DELAYED RELEASE ORAL 2 TIMES DAILY
Status: DISCONTINUED | OUTPATIENT
Start: 2017-08-25 | End: 2017-08-28 | Stop reason: HOSPADM

## 2017-08-25 RX ORDER — MAGNESIUM SULFATE HEPTAHYDRATE 40 MG/ML
4 INJECTION, SOLUTION INTRAVENOUS EVERY 4 HOURS PRN
Status: DISCONTINUED | OUTPATIENT
Start: 2017-08-25 | End: 2017-08-28

## 2017-08-25 RX ORDER — TRAZODONE HYDROCHLORIDE 50 MG/1
50 TABLET, FILM COATED ORAL
Status: DISCONTINUED | OUTPATIENT
Start: 2017-08-25 | End: 2017-08-28 | Stop reason: HOSPADM

## 2017-08-25 RX ORDER — NALOXONE HYDROCHLORIDE 0.4 MG/ML
.1-.4 INJECTION, SOLUTION INTRAMUSCULAR; INTRAVENOUS; SUBCUTANEOUS
Status: DISCONTINUED | OUTPATIENT
Start: 2017-08-25 | End: 2017-08-28 | Stop reason: HOSPADM

## 2017-08-25 RX ORDER — POTASSIUM CHLORIDE 29.8 MG/ML
20 INJECTION INTRAVENOUS
Status: DISCONTINUED | OUTPATIENT
Start: 2017-08-25 | End: 2017-08-25 | Stop reason: RX

## 2017-08-25 RX ORDER — POTASSIUM CHLORIDE 1500 MG/1
20-40 TABLET, EXTENDED RELEASE ORAL
Status: DISCONTINUED | OUTPATIENT
Start: 2017-08-25 | End: 2017-08-28 | Stop reason: HOSPADM

## 2017-08-25 RX ORDER — BUPRENORPHINE AND NALOXONE 2; .5 MG/1; MG/1
0.25 FILM, SOLUBLE BUCCAL; SUBLINGUAL EVERY EVENING
COMMUNITY
End: 2018-09-20

## 2017-08-25 RX ORDER — GABAPENTIN 100 MG/1
100 CAPSULE ORAL 3 TIMES DAILY
Status: DISCONTINUED | OUTPATIENT
Start: 2017-08-25 | End: 2017-08-25

## 2017-08-25 RX ORDER — ONDANSETRON 4 MG/1
4 TABLET, ORALLY DISINTEGRATING ORAL EVERY 6 HOURS PRN
Status: DISCONTINUED | OUTPATIENT
Start: 2017-08-25 | End: 2017-08-28 | Stop reason: HOSPADM

## 2017-08-25 RX ORDER — AMOXICILLIN 250 MG
1-2 CAPSULE ORAL 2 TIMES DAILY PRN
Status: DISCONTINUED | OUTPATIENT
Start: 2017-08-25 | End: 2017-08-27 | Stop reason: CLARIF

## 2017-08-25 RX ORDER — POTASSIUM CHLORIDE 1.5 G/1.58G
20-40 POWDER, FOR SOLUTION ORAL
Status: DISCONTINUED | OUTPATIENT
Start: 2017-08-25 | End: 2017-08-28 | Stop reason: HOSPADM

## 2017-08-25 RX ORDER — GABAPENTIN 100 MG/1
100 CAPSULE ORAL 3 TIMES DAILY
Status: DISCONTINUED | OUTPATIENT
Start: 2017-08-25 | End: 2017-08-28 | Stop reason: HOSPADM

## 2017-08-25 RX ORDER — BUPRENORPHINE AND NALOXONE 2; .5 MG/1; MG/1
1 FILM, SOLUBLE BUCCAL; SUBLINGUAL 2 TIMES DAILY
Status: DISCONTINUED | OUTPATIENT
Start: 2017-08-25 | End: 2017-08-25

## 2017-08-25 RX ORDER — ONDANSETRON 2 MG/ML
4 INJECTION INTRAMUSCULAR; INTRAVENOUS EVERY 6 HOURS PRN
Status: DISCONTINUED | OUTPATIENT
Start: 2017-08-25 | End: 2017-08-28 | Stop reason: HOSPADM

## 2017-08-25 RX ORDER — NICOTINE POLACRILEX 4 MG
15-30 LOZENGE BUCCAL
Status: DISCONTINUED | OUTPATIENT
Start: 2017-08-25 | End: 2017-08-28 | Stop reason: HOSPADM

## 2017-08-25 RX ORDER — POTASSIUM CL/LIDO/0.9 % NACL 10MEQ/0.1L
10 INTRAVENOUS SOLUTION, PIGGYBACK (ML) INTRAVENOUS
Status: DISCONTINUED | OUTPATIENT
Start: 2017-08-25 | End: 2017-08-28 | Stop reason: HOSPADM

## 2017-08-25 RX ORDER — ACETAMINOPHEN 325 MG/1
325 TABLET ORAL EVERY 6 HOURS PRN
COMMUNITY
End: 2021-02-28

## 2017-08-25 RX ORDER — TRAZODONE HYDROCHLORIDE 50 MG/1
50 TABLET, FILM COATED ORAL AT BEDTIME
Status: DISCONTINUED | OUTPATIENT
Start: 2017-08-25 | End: 2017-08-26

## 2017-08-25 RX ADMIN — TRAZODONE HYDROCHLORIDE 50 MG: 50 TABLET ORAL at 22:27

## 2017-08-25 RX ADMIN — ACETAMINOPHEN 650 MG: 325 TABLET, FILM COATED ORAL at 22:26

## 2017-08-25 RX ADMIN — DULOXETINE HYDROCHLORIDE 30 MG: 30 CAPSULE, DELAYED RELEASE PELLETS ORAL at 11:01

## 2017-08-25 RX ADMIN — ACETAMINOPHEN 650 MG: 325 TABLET, FILM COATED ORAL at 18:31

## 2017-08-25 RX ADMIN — BUPRENORPHINE HYDROCHLORIDE, NALOXONE HYDROCHLORIDE 0.25 FILM: 2; .5 FILM, SOLUBLE BUCCAL; SUBLINGUAL at 20:04

## 2017-08-25 RX ADMIN — MODAFINIL 400 MG: 200 TABLET ORAL at 11:01

## 2017-08-25 RX ADMIN — ACETAMINOPHEN 650 MG: 325 TABLET, FILM COATED ORAL at 14:28

## 2017-08-25 RX ADMIN — DULOXETINE HYDROCHLORIDE 30 MG: 30 CAPSULE, DELAYED RELEASE PELLETS ORAL at 20:05

## 2017-08-25 RX ADMIN — BUSPIRONE HYDROCHLORIDE 30 MG: 10 TABLET ORAL at 20:05

## 2017-08-25 RX ADMIN — BUSPIRONE HYDROCHLORIDE 30 MG: 10 TABLET ORAL at 11:01

## 2017-08-25 RX ADMIN — POLYETHYLENE GLYCOL 3350 17 G: 17 POWDER, FOR SOLUTION ORAL at 11:01

## 2017-08-25 RX ADMIN — OMEPRAZOLE 40 MG: 20 CAPSULE, DELAYED RELEASE ORAL at 20:06

## 2017-08-25 RX ADMIN — TAZOBACTAM SODIUM AND PIPERACILLIN SODIUM 3.38 G: 375; 3 INJECTION, SOLUTION INTRAVENOUS at 18:31

## 2017-08-25 RX ADMIN — SENNOSIDES 2 TABLET: 8.6 TABLET, FILM COATED ORAL at 20:05

## 2017-08-25 RX ADMIN — INSULIN GLARGINE 4 UNITS: 100 INJECTION, SOLUTION SUBCUTANEOUS at 20:06

## 2017-08-25 RX ADMIN — SODIUM CHLORIDE: 9 INJECTION, SOLUTION INTRAVENOUS at 02:08

## 2017-08-25 RX ADMIN — SENNOSIDES 2 TABLET: 8.6 TABLET, FILM COATED ORAL at 11:01

## 2017-08-25 RX ADMIN — NALOXEGOL OXALATE 25 MG: 25 TABLET, FILM COATED ORAL at 12:21

## 2017-08-25 RX ADMIN — OMEPRAZOLE 40 MG: 20 CAPSULE, DELAYED RELEASE ORAL at 11:01

## 2017-08-25 RX ADMIN — GABAPENTIN 100 MG: 100 CAPSULE ORAL at 20:06

## 2017-08-25 RX ADMIN — GABAPENTIN 100 MG: 100 CAPSULE ORAL at 11:01

## 2017-08-25 RX ADMIN — TAZOBACTAM SODIUM AND PIPERACILLIN SODIUM 3.38 G: 375; 3 INJECTION, SOLUTION INTRAVENOUS at 00:20

## 2017-08-25 RX ADMIN — TAZOBACTAM SODIUM AND PIPERACILLIN SODIUM 3.38 G: 375; 3 INJECTION, SOLUTION INTRAVENOUS at 12:22

## 2017-08-25 RX ADMIN — LEVOTHYROXINE SODIUM 75 MCG: 75 TABLET ORAL at 11:01

## 2017-08-25 RX ADMIN — TAZOBACTAM SODIUM AND PIPERACILLIN SODIUM 3.38 G: 375; 3 INJECTION, SOLUTION INTRAVENOUS at 06:10

## 2017-08-25 RX ADMIN — BUPRENORPHINE HYDROCHLORIDE, NALOXONE HYDROCHLORIDE 1 FILM: 2; .5 FILM, SOLUBLE BUCCAL; SUBLINGUAL at 06:06

## 2017-08-25 RX ADMIN — ACETAMINOPHEN 650 MG: 325 TABLET, FILM COATED ORAL at 02:08

## 2017-08-25 NOTE — ED NOTES
Spoke with patient about her positive blood culture (GNR). Patient reports she still feels unwell. Patient instructed to come back to the ED. Patient agreed to come in.      Elza Peralta MD  08/24/17 6660

## 2017-08-25 NOTE — PHARMACY-ADMISSION MEDICATION HISTORY
"Admission medication history interview status for this patient is complete. See [facility] admission navigator for allergy information, prior to admission medications and immunization status.     Medication history interview source(s):Patient  Medication history resources (including written lists, pill bottles, clinic record):None  [facility]    Changes made to PTA medication list:  Added: movantik, narcan, clonidine, acetaminophen  Deleted: prempro, diflucan, boost, macrobid, aquadeks  Changed: dolace from suspension to tablet, gabapentin from 200 to 100 mg TID, claritin from daily to prn, trazodone from qhs to qhs prn.    Actions taken by pharmacist (provider contacted, etc):None     Additional medication history information:The novopen JR is marked in 0.5 units therefore she is instructed to take \"1 unit click\" on the pen so she is truly getting 0.5 units from the novopen JR       Medication reconciliation/reorder completed by provider prior to medication history? Yes    Do you take OTC medications (eg tylenol, ibuprofen, fish oil, eye/ear drops, etc)? Y(Y/N)    For patients on insulin therapy: Y (Y/N)  Lantus/levemir/NPH/Mix 70/30 dose:   (Y) (see Med list for doses)   Sliding scale Novolog Y  If Yes, do you have a baseline novolog pre-meal dose:   1 unit for every heavy carb meal  Patients eat three meals a day:   Y   Any Barriers to therapy - Be specific :  cost of medications, comfortable with giving injections (if applicable), comfortable and confident with current diabetes regimen: Y/N ______________    Time spent in this activity: 20 min    Prior to Admission medications    Medication Sig Last Dose Taking? Auth Provider   buprenorphine HCl-naloxone HCl (SUBOXONE) 2-0.5 MG per film Place 0.25 Film under the tongue every evening Past Week at Unknown time Yes Unknown, Entered By History   DOCUSATE SODIUM PO Take 100 mg by mouth daily 8/24/2017 at Unknown time Yes Unknown, Entered By History "   GABAPENTIN PO Take 100 mg by mouth 3 times daily 8/24/2017 at Unknown time Yes Unknown, Entered By History   TRAZODONE HCL PO Take 50 mg by mouth nightly as needed for sleep Past Week at Unknown time Yes Unknown, Entered By History   naloxegol (MOVANTIK) 25 MG TABS tablet Take 25 mg by mouth every morning (before breakfast) 8/24/2017 at Unknown time Yes Unknown, Entered By History   naloxone (NARCAN) nasal spray Spray 4 mg in nostril as needed for opioid reversal  at prn Yes Unknown, Entered By History   CLONIDINE HCL PO Take 0.1 mg by mouth 2 times daily as needed  at prn Yes Unknown, Entered By History   ACETAMINOPHEN PO Take 325 mg by mouth every 6 hours as needed for pain Past Week at Unknown time Yes Unknown, Entered By History   amoxicillin-clavulanate (AUGMENTIN) 875-125 MG per tablet Take 1 tablet by mouth 2 times daily for 7 days 8/24/2017 at Unknown time Yes Alonzo Toure MD   busPIRone (BUSPAR) 15 MG tablet Increased dose. 30 mg two times per day. 8/24/2017 at Unknown time Yes Maryana Brooks MD   amylase-lipase-protease (VIOKACE) 73972 UNITS TABS tablet 4-5 aps with meals and 1-2 with snacks 8/25/2017 at am Yes Adriana Robles MD   insulin glargine (LANTUS SOLOSTAR) 100 UNIT/ML injection Inject 4 Units Subcutaneous every 24 hours  Patient taking differently: Inject 4 Units Subcutaneous every evening  8/24/2017 at 7pm Yes Adriana Robles MD   levothyroxine (SYNTHROID/LEVOTHROID) 75 MCG tablet Take 1 tablet (75 mcg) by mouth daily 8/24/2017 at Unknown time Yes Maryana Brooks MD   buprenorphine HCl-naloxone HCl (SUBOXONE) 2-0.5 MG per film Place 0.5 Film under the tongue every morning  8/24/2017 at Unknown time Yes Reported, Patient   DULoxetine (CYMBALTA) 30 MG EC capsule Take 1 capsule (30 mg) by mouth 2 times daily Fill at patient request. 8/24/2017 at Unknown time Yes Domonique Glynn NP   omeprazole (PRILOSEC) 40 MG capsule Take 1 capsule (40 mg) by mouth 2 times daily Take  30-60 minutes before a meal. 8/24/2017 at Unknown time Yes Maryana Brooks MD   estradiol (ESTRACE VAGINAL) 0.1 MG/GM vaginal cream Place 2 g vaginally twice a week Past Week at Unknown time Yes Kavitha Spencer,    sennosides (SENOKOT) 8.6 MG tablet Take 2 tablets by mouth 2 times daily 8/24/2017 at Unknown time Yes Bharat Coates MD   Injection Device for insulin (NOVOPEN JR, GREEN,) MODESTA 1 Device Inject 1 unit (which is marked as a 1/2 unit on the pen itself)  as needed when eating carb heavy meals. Past Week at Unknown time Yes Unknown, Entered By History   loratadine (CLARITIN) 10 MG tablet Take 10 mg by mouth daily as needed  Past Week at Unknown time Yes Unknown, Entered By History   polyethylene glycol (MIRALAX/GLYCOLAX) packet Take 17 g by mouth daily 8/24/2017 at Unknown time Yes Brodie, Iliana Moulton PA-C   modafinil (PROVIGIL) 200 MG tablet Take 2 tablets by mouth Once a Day 8/24/2017 at Unknown time Yes Reported, Patient   glucose 40 % GEL Take 15-30 g by mouth every 15 minutes as needed.  at prn Yes Suzi Short APRN CNP   insulin pen needle (BD MAHESH U/F) 32G X 4 MM Use up to 3 times daily as needed for insulin dosing   Adriana Robles MD   blood glucose (BILL MICROLET 2) lancing device Use to test blood sugars 6 times daily or as directed.   Adriana Robles MD   blood glucose monitoring (BILL CONTOUR NEXT) test strip Check BS 4-6 times a day   Adriana Robles MD   NOVOLOG PENFILL SOLN 100 UNIT/ML Take as directed   Adriana Robles MD   [employer] For insulin needles   Suzi Short APRN CNP

## 2017-08-25 NOTE — CONSULTS
INFECTIOUS DISEASE CONSULTATION      REQUESTING PHYSICIAN:  Dr. Tania Sotelo      REASON FOR CONSULTATION:  I was asked by Dr. Sotelo to assess E. coli bacteremia.      IMPRESSION:   1.  Lynn Thompson, 52-year-old female with a history of recurrent pancreatitis.  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   2.  Distant history of Crohn's disease.  Status post ileal resection in the 1990s.  No further episodes of Crohn's disease since that time.   3.  Status post gastric bypass surgery.   4.  History of recurrent small-bowel obstruction.   5.  Admitted on this occasion with a 1-day history of fever, chills and malaise with some right upper quadrant abdominal pain.  Now found to have E. coli bacteremia.  A urinalysis is benign and urine culture unremarkable.  CT scan of the abdomen reveals no acute infectious process.  Liver enzymes elevated, which apparently has historically been true for the patient during times of illness.  The source of the bacteremia is unclear, perhaps hepatobiliary in origin.      RECOMMENDATIONS:   1.  Continue IV Zosyn for the present.   2.  Await sensitivities on the E. coli.  Likely can simplify the patient's IV antibiotic at that time.   3.  Likely to oral antibiotic therapy in a couple of days if continued clinical improvement.  Likely a total 2-week course of antibiotic therapy.   4.  No other obvious workup to be done at this time.      HISTORY OF PRESENT ILLNESS:  This is a 52-year-old female with previous history of recurrent pancreatitis resulting in pancreatectomy with transplantation of islet cells to the liver.  She has also undergone gastric bypass surgery and has had admissions in the past for recurrent small-bowel obstruction.  She was diagnosed with Crohn's disease in the 1990s and underwent ileal resection.  She has not had any more troubles from the Crohn's since that time.  She was in her usual state of health until the day prior  to her admission when she awakened with generalized malaise, fever, chills and some nasal congestion.  She was seen in the Emergency Department where blood cultures were drawn.  She was felt to possibly have some sinusitis and thus was discharged on Augmentin.  When blood cultures turned positive.  She was readmitted to the hospital and the bacteremia had the blood isolate has been identified as E. coli.  She denies any urinary symptoms.  She has no abdominal discomfort at present.  CT scan result is as above.  Gallbladder is surgically absent.      PAST MEDICAL HISTORY:   1.  Recurrent pancreatitis.   2.  Diabetes mellitus as a result of #1.   3.  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   4.  Gastric bypass.   5.  Recurrent small-bowel obstruction.   6.  Crohn's disease.  Status post ileal resection.   7.  Chronic pain syndrome.      ALLERGIES:  None known to antibiotics.      SOCIAL HISTORY:   and lives at home.      FAMILY HISTORY:  Noncontributory.      REVIEW OF SYSTEMS:  Negative other than that described above.      PHYSICAL EXAMINATION:   VITAL SIGNS:  Temperature 98.4, blood pressure 110/38, heart rate 71 and regular.   GENERAL:  Well-developed, well-nourished, alert and oriented, does not look acutely ill.   SKIN:  No rashes or nodules.   HEENT:  Eyes:  No subconjunctival hemorrhages or scleral icterus.  Oropharynx without erythema or exudate.   NECK:  Supple, no thyromegaly.   LYMPH:  No cervical or axillary lymphadenopathy.   LUNGS:  Clear to auscultation, no use of accessory muscles of respiration.   COR:  S1, S2 normal, no S3, S4 or murmur.   ABDOMEN:  Soft, nontender, no mass or hepatosplenomegaly.   EXTREMITIES:  No calf tenderness or pedal edema.      For further details of the patient's history, please refer to the chart.  Thank you very much for this consultation.         JOSÉ MIGUEL GARNER MD             D: 08/25/2017 11:22   T: 08/25/2017 11:41   MT: [id number]      Name:     JAYDEN  ANGEL   MRN:      [id number]        Account:       [id number]   :      1965           Consult Date:  2017      Document: [id number]

## 2017-08-25 NOTE — PLAN OF CARE
Problem: Goal Outcome Summary  Goal: Goal Outcome Summary  Noc RN- Pt arrived to floor room ER @ 0100. Max temp 101.3, tylenol given for generalized achiness & pt requests no narcotics. Pt does check blood sugars at home before meals. Pt declined Lantus, stating she takes it at 7pm. Recheck on temp 100.3. Pt able to rest at short intervals.

## 2017-08-25 NOTE — ED PROVIDER NOTES
History     Chief Complaint:  Abnormal labs    The history is provided by the patient.      Lynn Thompson is a 52 year old female who presents to the emergency department today after her blood cultures from earlier today came back positive. She was seen earlier today for a fever with cough and possible sinus infection. The patient took Tylenol around 1400 and ibuprofen around 1800 today. The patient states that her back had hurt and her abdomen felt distended prior to coming into the ED. The patient reports no dysuria, shortness of breath, coughing, or congestion.     Allergies:  Povidone iodine  Corticosteroids  Nsaids  Sulfasalazine     Medications:    Buspar  Viokace  Lantus solostar  Synthroid  Macrobid  Diflucan  Suboxone  Desyrel  Cymbalta  Prilosec  Estradiol  Prempro  Senokot  Colace  Neurontin  Claritin  Miralax  Provigil     Past Medical History:    Abdominal pain, epigastric  Benign paroxysmal positional vertigo  Calculus of kidney  Chronic abdominal pain  Depressive disorder  Chronic pancreatitis  Diabetes  Dyspepsia and other specified disorders of function of stomach  Headache  Hypertension  Iron deficiency anemia secondary to inadequate dietary iron intake  Obesity  Other chronic pain  Pancreatic disease  Post-pancreatectomy diabetes  Pyelonephritis  Regional enteritis of unspecified site  Sleep apnea  Spasm of sphincter of Oddi  Thyroid nodule  Uteral calculus  Vaccination not carried out     Past Surgical History:    Appendectomy   Right bunion procedure  Tonsillectomy and adenoidectomy  Partial ileum resection  Right ovarian cyst  C section  cholecystectomy  CBD stent  Colonoscopy  Gastric bypass x2  Combined cystoscopy, retrogrades, uteroscopy, laser holmium lithotripsy  [facility]  Cystoscopy, retrogrades, insert stent ureters  Ent surgery  Extracorporeal shock wave lithotripsy  HC Lap lysis of adhesions  Hernia repair  Small bowel obstruction surgery  Pancreatomy,  transplant auto islet cell  Transplant     Family History:    COPD  Kidney stones  Substance abuse  Depression  Asthma  Anxiety disorder     Social History:  Smoking Status: No  Smokeless Tobacco: No  Alcohol Use: No  Marital Status:      Review of Systems   Constitutional: Positive for fever.   HENT: Positive for rhinorrhea. Negative for congestion.    Respiratory: Negative for cough and shortness of breath.    Genitourinary: Negative for dysuria.   All other systems reviewed and are negative.    Physical Exam     Patient Vitals for the past 24 hrs:   BP Temp Temp src Heart Rate Resp SpO2   08/24/17 2007 127/54 97.2  F (36.2  C) Temporal 69 16 99 %        Physical Exam  Constitutional: Patient appears well-developed and well-nourished. Patient is in no acute distress  HENT:   Head: No external signs of trauma. No lesions noted.  Eyes: PEERL, EOMI B/L, no pain or limitation of superior gaze  Ears: Normal B/L TM and external canals  Nose: Noncongested, no exudates. No rhinorrhea. No FB noted  Mouth/Throat:    Mucous membranes are moist and normal.    No Oropharyngeal exudate. No oropharyngeal erythema noted.   No tonsilar swelling noted.    No uvular deviation noted.   No swelling noted on the floor of the mouth  Neck:  FROM. Neck is supple  Cardiovascular:    Normal rate, regular rhythm and normal heart sounds.     Exam reveals no friction rub.     No murmur heard.  Pulmonary/Chest:    Effort normal and breath sounds normal.    No respiratory distress.    There are no wheezes.    There are no rales.   Abdominal:    Soft.    Bowel sounds normal.    There is no distension.    There is mild left sided abdominal tenderness.    There is no rebound or guarding.   Musculoskeletal:    Normal range of motion.    Normal Tone  Neurological: Patient is alert and oriented to person, place, and time.   Skin: Skin is warm and dry. Patient is not diaphoretic.    Emergency Department Course     Laboratory:  Laboratory  findings were communicated with the patient who voiced understanding of the findings.  CBC: WBC 7.7, HGB 13.4,   Hepatic panel: Albumin 3.2 (L), Alkphos 271 (H),  (H),  (H).  Blood Culture 1: pending  Blood Culture 2: pending     Radiology:  US RUQ: pending    Interventions:  Zosyn 3.375 g IV - pending    Emergency Department Course:  Nursing notes and vitals reviewed.  2125 I entered the room.  2128 I performed an exam of the patient as documented above.   IV was inserted and blood was drawn for laboratory testing, results above.    Tthe patient was rechecked and she was updated on the results of her laboratory studies.    At 2330 I spoke with Dr. Wilson from transplant surgery regarding patient's presentation, findings, and plan of care.   I discussed the treatment plan with the patient. They expressed understanding of this plan and consented to admission. I discussed the patient with Dr. Sotelo, who will admit the patient to a monitored bed for further evaluation and treatment.     Impression & Plan      Medical Decision Making:  This 53 y/o female patient presents back to the ED due to positive blood cultures which grew out gram negative rods. She had been febrile, but states that she thinks her fever broke around 1800 today. She otherwise feels well. I discussed the case with the hospitalist initially, who asked that I discuss the patient with her pancreas physician. The patient is several years out of pancreas removal and islet cell transplant to her liver. I talked to the on-call transplant surgeon, but he did not think that transfer to the [facility] was warranted.     I discussed the case again with the hospitalist who will admit the patient and start her on Zosyn. She requested a RUQ US given the elevated LFT's.    Diagnosis:    ICD-10-CM   1. Positive blood culture R78.81   2. Elevated LFTs R79.89     Disposition:   The patient was admitted to the hospital for further evaluation and  care.     Scribe Disclosure:  I, Antonio Martin, am serving as a scribe at 9:32 PM on 8/24/2017 to document services personally performed by James Torres DO, based on my observations and the provider's statements to me. [facility] EMERGENCY DEPARTMENT       James Torres DO  08/25/17 0013

## 2017-08-25 NOTE — H&P
[facility]  Hospitalist Admission Note  Name: Lynn Thompson    MRN: [id number]  YOB: 1965    Age: 52 year old  Date of admission: 8/24/2017              Brief patient summary: Pt is a 52 yr old female with h/o gastric bypass surgery, pancreatic transplant due to recurrent pancreatitis resulting in DM1 not on any immunosuppressants, crohn's disease s/p ileal resection with h/o recurrent SBO who presented on 8/24/2017 with generalized malaise, fevers, chills and nasal congestion. She was initially discharged from ER with augmentin for sinusitis however called back to the hospital due to +ve blood culture with E coli. No source of infection noted yet and no localizing signs.          Assessment and Plan:   Sepsis 2/2 E coli bacteremia: unknown source. Pt was having some epigastric abd pain  Initially but this is chronic and due to abd wall spasm. No new pain and currently resolved. She denies any diarrhea, N/V. Only c/o generalized body ache and nasal congestion. UA -ve. Ct abd -ve for intra-abd pathology. LFTs are elevated but CT and US are -ve for acute pathology  --cont IV zosyn  --re-culture in the AM until blood cx is clear  --ID consult to determine abx and duration given no clear source ?further w/u     Transaminitis: LFTs are quite deranged however nothing acute noted on CT or US liver. She has had her gall bladder and pancreas removed however has islet cell transplant on liver ?acute phase reaction. She has had similar elevations in the past during times of illness which resolved w/o intervention  --cont to monitor     H/o crohns s/p ileal resection and h/o SBO: no e/o SBO on CT or on exam  H/o DM1 s/p pancreatic transplant: resume lantus 4 U an SSI  H/o gastric bypass surgery  Chronic pain syndrome on suboxone    DVT Prophylaxis: Pneumatic Compression Devices  Discharge Dispo: home  Estimated Disch Date / # of Days until Disch: 2-3 days   Full Code    Chief Complaint: weakness           History of Present Illness:   Discussed with ER physician : Dr. Melissa Reedxavier Thompson is a 52 year old female who presents with generalized body aches, nasal congestion , fevers, chills. She was seen in the AM of 8/24- initially discharged from ER with augmentin for sinusitis however called back to the hospital tonight due to +ve blood culture with E coli. No source of infection noted yet and no localizing signs. She was dc on augmentin and did not feel well  She noted epigastric abd pain-like a spasm which she has had in the past. It resolved spontanously and did not re occur  She denies urinary symptoms, diarrhea, back pain or HA           Past Medical History:     Patient Active Problem List   Diagnosis     Iron deficiency anemia secondary to inadequate dietary iron intake     Gastric bypass status for obesity     Health Care Home     Chronic pain syndrome     Acquired absence of pancreas     Exocrine pancreatic insufficiency     Asplenia     BLU (obstructive sleep apnea)     S/P exploratory laparotomy     Dysthymia     Anxiety     Thyroid nodule     Acquired hypothyroidism     Post-pancreatectomy diabetes (H)      Past Medical History:   Diagnosis Date     Abdominal pain, epigastric 11/05, ongoing; goes to pain clinic    probable recurrent spasm sphincter of Oddi causing biliary colic pains      Benign paroxysmal positional vertigo     occ.      Calculus of kidney 5/05    x1 on L side passed, several stones.  Has been tested for oxalate.     Chronic abdominal pain 7/17/2013     Chronic pain     chronic pancreatitis     Chronic pancreatitis (H) 7/17/2013     Depressive disorder      Depressive disorder, not elsewhere classified     also occ panic spells     Diabetes (H)     post pancreatectomy     Dyspepsia and other specified disorders of function of stomach 6/99    H. pylori   treated     Headache 12/28/2004     Headache 12/28/2004     Headache 12/28/2004     Headache(784.0)     still periodic  HA's ;  often 5X/week     Hypertension 16    Stress related     Iron deficiency anemia secondary to inadequate dietary iron intake     relates to gastric bypass     Obesity, unspecified     better after gastric bypass      Other chronic pain      Pancreatic disease      Post-pancreatectomy diabetes (H) 2013     Pyelonephritis, unspecified , 10/8    L side     Regional enteritis of unspecified site     inacive/remission     Sleep apnea     uses Cpap     Spasm of sphincter of Oddi     ERCP with Stent of CBD by Fernandez @ [facility] with sx relief     Spasm of sphincter of Oddi     surgical + endoscopic stenting of pancreatic duct @ [facility] 06     Thyroid nodule 2016     Ureteral calculus 10/2/2012     Vaccination not carried out                 Past Surgical History:     Past Surgical History:   Procedure Laterality Date     APPENDECTOMY       C NONSPECIFIC PROCEDURE      R bunion     C NONSPECIFIC PROCEDURE      T & A     C NONSPECIFIC PROCEDURE      partial ileum resection     C NONSPECIFIC PROCEDURE      R ovarian cyst     C NONSPECIFIC PROCEDURE           C NONSPECIFIC PROCEDURE      GALL BLADDER     C NONSPECIFIC PROCEDURE      CBD stent; Dr. Presley     C NONSPECIFIC PROCEDURE   &     colonoscopy     C NONSPECIFIC PROCEDURE      Gastric bypass     CHOLECYSTECTOMY       COLONOSCOPY       COMBINED CYSTOSCOPY, RETROGRADES, URETEROSCOPY, LASER HOLMIUM LITHOTRIPSY URETER(S), INSERT STENT Right 3/23/2015    Procedure: COMBINED CYSTOSCOPY, RETROGRADES, URETEROSCOPY, LASER HOLMIUM LITHOTRIPSY URETER(S), INSERT STENT;  Surgeon: Kennedi Aldana MD;  Location: [facility] OR     COMBINED CYSTOSCOPY, RETROGRADES, URETEROSCOPY, LASER HOLMIUM LITHOTRIPSY URETER(S), INSERT STENT Right 2015    Procedure: COMBINED CYSTOSCOPY, RETROGRADES, URETEROSCOPY, LASER HOLMIUM LITHOTRIPSY URETER(S), INSERT STENT;  Surgeon: Kennedi Aldana,  MD;  Location: [facility] OR     COSMETIC SURGERY  6/24/2002    Tummy tuck     CYSTOSCOPY, RETROGRADES, INSERT STENT URETER(S), COMBINED  10/2/2012    Procedure: COMBINED CYSTOSCOPY, RETROGRADES, INSERT STENT URETER(S);  COMBINED CYSTOSCOPY,  , INSERT LEFT STENT URETER;  Surgeon: Johny Baez MD;  Location: [facility] OR     ENT SURGERY       EXTRACORPOREAL SHOCK WAVE LITHOTRIPSY (ESWL)  10/16/2012    Procedure: EXTRACORPOREAL SHOCK WAVE LITHOTRIPSY (ESWL);  left EXTRACORPOREAL SHOCK WAVE LITHOTRIPSY (ESWL) ;  Surgeon: Johny Baez MD;  Location: [facility] OR     GI SURGERY  12/29/2015    Small bowel obstruction     HC LAP,LYSIS OF ADHESIONS       HERNIA REPAIR  2/2015     LAPAROSCOPIC LYSIS ADHESIONS N/A 2/20/2015    Procedure: LAPAROSCOPIC LYSIS ADHESIONS;  Surgeon: Aaron Early MD;  [facility]     LAPAROSCOPIC LYSIS ADHESIONS N/A 12/29/2015    Procedure: LAPAROSCOPIC LYSIS ADHESIONS;  Surgeon: Aaron Early MD;  [facility]     PANCREATECTOMY, TRANSPLANT AUTO ISLET CELL, COMBINED  5/10/2013    Procedure: COMBINED PANCREATECTOMY, TRANSPLANT AUTO ISLET CELL;  Pancreatectomy, Auto Islet Cell Transplant   hernia repair, jejunostomy tube and liver biopsies with Anesthesia General with block;  Surgeon: Aaron Early MD;  Location: [facility] OR     TRANSPLANT  5/10/13               Home Medications:     Prior to Admission medications    Medication Sig Last Dose Taking? Auth Provider   amoxicillin-clavulanate (AUGMENTIN) 875-125 MG per tablet Take 1 tablet by mouth 2 times daily for 7 days   Alonzo Toure MD   busPIRone (BUSPAR) 15 MG tablet Increased dose. 30 mg two times per day.   Maryana Brooks MD   amylase-lipase-protease (VIOKACE) 31739 UNITS TABS tablet 4-5 aps with meals and 1-2 with snacks   Adriana Robles MD   Nutritional Supplements (BOOST HIGH PROTEIN) LIQD After above baseline labs are drawn, give: 6 mL/kg to maximum of 360 mL; the beverage is to be consumed within 5 minutes.   Adriana Robles MD    insulin glargine (LANTUS SOLOSTAR) 100 UNIT/ML injection Inject 4 Units Subcutaneous every 24 hours   Adriana Robles MD   insulin pen needle (BD MAHESH U/F) 32G X 4 MM Use up to 3 times daily as needed for insulin dosing   Adriana Robles MD   levothyroxine (SYNTHROID/LEVOTHROID) 75 MCG tablet Take 1 tablet (75 mcg) by mouth daily   Maryana Brokos MD   nitrofurantoin, macrocrystal-monohydrate, (MACROBID) 100 MG capsule Take 1 capsule (100 mg) by mouth 2 times daily   Maryana Brooks MD   fluconazole (DIFLUCAN) 150 MG tablet Take 1 tab with onset of symptoms, then repeat dose 3 days later   Maryana Brooks MD   buprenorphine HCl-naloxone HCl (SUBOXONE) 2-0.5 MG per film Place 1 Film under the tongue daily Pt takes 1/2 bid   Reported, Patient   traZODone (DESYREL) 50 MG tablet Take 1 tablet (50 mg) by mouth At Bedtime Fill at patient request.   Domonique Glynn NP   DULoxetine (CYMBALTA) 30 MG EC capsule Take 1 capsule (30 mg) by mouth 2 times daily Fill at patient request.   Domonique Glynn NP   omeprazole (PRILOSEC) 40 MG capsule Take 1 capsule (40 mg) by mouth 2 times daily Take 30-60 minutes before a meal.   Maryana Brooks MD   blood glucose (BILL MICROLET 2) lancing device Use to test blood sugars 6 times daily or as directed.   Adriana Robles MD   estradiol (ESTRACE VAGINAL) 0.1 MG/GM vaginal cream Place 2 g vaginally twice a week   Kavitha Spencer DO   estrogen, conjugated,-medroxyPROGESTERone (PREMPRO) 0.3-1.5 MG per tablet Take 1 tablet by mouth daily   Kavitha Spencer DO   sennosides (SENOKOT) 8.6 MG tablet Take 2 tablets by mouth 2 times daily   Bharat Coates MD   docusate (COLACE) 50 MG/5ML liquid Take 100 mg by mouth 2 times daily as needed for constipation   Unknown, Entered By History   Injection Device for insulin (NOVOPEN HUNTER CARBAJAL,) MODESTA 1 Device Inject 1 unit as needed for blood glucose above 140 mg/dL.   Unknown, Entered By History   loratadine  (CLARITIN) 10 MG tablet Take 10 mg by mouth daily   Unknown, Entered By History   blood glucose monitoring (BILL CONTOUR NEXT) test strip Check BS 4-6 times a day   Adriana Robles MD   gabapentin (NEURONTIN) 100 MG capsule Take 2 capsules (200 mg) by mouth 3 times daily  Patient taking differently: Take 100 mg by mouth 3 times daily    Bartolome Disla MD   polyethylene glycol (MIRALAX/GLYCOLAX) packet Take 17 g by mouth daily   Fast, Iliana Moulton PA-C   multivitamin CF formula (AQUADEKS) LIQD liquid Take 2.5 mLs by mouth daily   Fast, Iliana Moulton PA-C   modafinil (PROVIGIL) 200 MG tablet Take 2 tablets by mouth Once a Day   Reported, Patient   NOVOLOG PENFILL SOLN 100 UNIT/ML Take as directed   Adriana Robles MD   glucose 40 % GEL Take 15-30 g by mouth every 15 minutes as needed.   Suzi Short APRN CNP   [employer] Container MISC For insulin needles   Suzi Short APRN CNP            Allergies:     Allergies   Allergen Reactions     Povidone Iodine Hives     Causes skin to blister     Corticosteroids Other (See Comments)     All oral,IV and injectable steroids are contraindicated (unless in life threatening situations) in Islet Auto transplant recipients. They can cause irreversible loss of islet cell function. Please contact patients transplant care coordinator JONATHAN Carvalho RN at /pager   and Endocrinologist prior to administration.      Nsaids      naprosyn = GI upset     Povidone Iodine      blisters     Sulfasalazine Nausea and Nausea and Vomiting            Social History:     Social History     Social History     Marital status:      Spouse name: Tera     Number of children: 2     Years of education: N/A     Occupational History     customer service       [employer]     Social History Main Topics     Smoking status: Never Smoker     Smokeless tobacco: Never Used     Alcohol use No     Drug use: No     Sexual activity: Yes     Partners: Male      Birth control/ protection: None     Other Topics Concern     Parent/Sibling W/ Cabg, Mi Or Angioplasty Before 65f 55m? No     Social History Narrative                  Family History:     Family History   Problem Relation Age of Onset     Family History Negative Mother      Respiratory Father      COPD;  at 69     Genitourinary Problems Father      kidney stones     Substance Abuse Father      Depression Father      Asthma Father      HEART DISEASE Paternal Grandfather      M.I.     Coronary Artery Disease Paternal Grandfather      Hyperlipidemia Paternal Grandfather      Genitourinary Problems Brother      multiple brothers with kidney stones     GASTROINTESTINAL DISEASE Maternal Grandmother      undiagnosed 'gut' issues     Coronary Artery Disease Maternal Grandfather      Hyperlipidemia Maternal Grandfather      CEREBROVASCULAR DISEASE Paternal Grandmother      At the age of 103     Anxiety Disorder Paternal Grandmother      OSTEOPOROSIS Paternal Grandmother      Anxiety Disorder Son      Anxiety Disorder Daughter      Asthma Daughter                    Review of Systems:   The 10 point Review of Systems is negative other than noted in the HPI.              Physical Exam:     Heart Rate: 69, Blood pressure 127/54, temperature 97.2  F (36.2  C), temperature source Temporal, resp. rate 16, last menstrual period 2013, SpO2 99 %, not currently breastfeeding.  0 lbs 0 oz     General: Alert, awake, no acute distress.  HEENT: NC/AT, eyes anicteric, external occular movements intact, face symmetric.  Dentition WNL, MM moist.  Cardiac: RRR, S1, S2.  No murmurs appreciated.  Pulmonary: Normal chest rise, normal work of breathing.  Lungs CTA BL  Abdomen: soft, non-tender, non-distended.  Bowel Sounds Present.  No guarding.  Extremities: no deformities.  Warm, well perfused.  Skin: no rashes or lesions noted.  Warm and Dry.  Neuro: No focal deficits noted.  Speech clear.  Coordination and strength grossly  normal.  Psych: Appropriate affect.         Data:   All new lab and imaging data was reviewed.    Recent Labs  Lab 08/24/17  2221 08/24/17  0613   WBC 7.7 13.2*   HGB 13.4 13.7   HCT 41.2 42.4   MCV 93 93    354       Recent Labs  Lab 08/24/17  0613      POTASSIUM 4.1   CHLORIDE 103   CO2 32   ANIONGAP 5   GLC 83   BUN 18   CR 0.75   GFRESTIMATED 80   GFRESTBLACK >90   VALARIE 8.7          Imaging:  Results for orders placed or performed during the hospital encounter of 08/24/17   Abdomen US, limited (RUQ only)    Narrative    US ABDOMEN LIMITED  8/25/2017 12:55 AM    CLINICAL INFORMATION: Increasing liver function tests and fever. Prior  cholecystectomy, appendectomy, splenectomy, and pancreatectomy with  islet cell transplant.     COMPARISON: CT dated 8/24/2017.    FINDINGS: Limited right upper quadrant ultrasound demonstrates the  gallbladder is absent. No bile duct dilatation. Common hepatic duct  measures 0.2 cm in diameter. Patchy echogenicity in the liver may be  due to mild fatty infiltration. Pancreas is not seen. The visualized  portion of the right kidney is unremarkable. No hydronephrosis on the  right. No free fluid in the right upper quadrant.      Impression    IMPRESSION:  1. No acute sonographic findings in the right upper quadrant.  2. Cholecystectomy.     *Note: Due to a large number of results and/or encounters for the requested time period, some results have not been displayed. A complete set of results can be found in Results Review.            Tania Sotelo MD  Hospitalist  [facility] [address] [place], [place] [address] [phone]

## 2017-08-25 NOTE — ED NOTES
[facility]  ED Nurse Handoff Report    Lynn Thompson is a 52 year old female   ED Chief complaint: No chief complaint on file.  . ED Diagnosis:   Final diagnoses:   Positive blood culture     Allergies:   Allergies   Allergen Reactions     Povidone Iodine Hives     Causes skin to blister     Corticosteroids Other (See Comments)     All oral,IV and injectable steroids are contraindicated (unless in life threatening situations) in Islet Auto transplant recipients. They can cause irreversible loss of islet cell function. Please contact patients transplant care coordinator JONATHAN Carvalho RN at /pager   and Endocrinologist prior to administration.      Nsaids      naprosyn = GI upset     Povidone Iodine      blisters     Sulfasalazine Nausea and Nausea and Vomiting       Code Status: Full Code  Activity level - Baseline/Home:  Independent. Activity Level - Current:   Independent. Lift room needed: No. Bariatric: No   Needed: No   Isolation: No. Infection: Not Applicable.     Vital Signs:   Vitals:    08/24/17 2007   BP: 127/54   Resp: 16   Temp: 97.2  F (36.2  C)   TempSrc: Temporal   SpO2: 99%       Cardiac Rhythm:  ,      Pain level: 0-10 Pain Scale: 3  Patient confused: No. Patient Falls Risk: No.   Elimination Status: Has voided   Patient Report -Patient came in for call back for positive blood cultures. Focused Assessment: no symptoms  Tests Performed: repeat labs and cultures. Abnormal Results:    Treatments provided:IV and LABS  Family Comments: No concerns  OBS brochure/video discussed/provided to patient:  N/A  ED Medications:   Medications   sodium chloride (PF) 0.9% PF flush 3 mL (not administered)   sodium chloride (PF) 0.9% PF flush 3 mL (not administered)     Drips infusing:  No  For the majority of the shift this patient was Green. Interventions performed were none     Severe Sepsis OR Septic Shock Diagnosis Present: No      ED Nurse Name/Phone Number:  Rossy Barbosa,   10:31 PM    RECEIVING UNIT ED HANDOFF REVIEW    Above ED Nurse Handoff Report was reviewed: {yes  Reviewed by: Sandra Willis on August 25, 2017 at 12:40 AM

## 2017-08-25 NOTE — PHARMACY-CONSULT NOTE
"Pharmacy inpatient consult: \"Review patient meds for med causing hepatitis\"      Reviewed home medications, several meds have warnings associated with hepatitis/ elevated LFTs:      Acetaminophen: Risk of hepatotoxicity: listed on home med list, does not appear pt takes enough to be significant risk - would have to assess further.    Duloxetine: Liver test abnormalities with ALT elevations above 3 times the upper limit of normal have been reported to occur in ~1% of patients on duloxetine, but elevations were usually self-limited and did not require dose modification or discontinuation.    Modafinil: In clinical trials, modafinil and armodafinil were associated with a low rate of serum aminotransferase and alkaline phosphatase elevations (<1%).     Omeprazole: Has rarely been associated with hepatic injury. In large scale, long term trials of omeprazole, serum ALT elevations occurred in less than 1% of patients and at rates similar to those that occurred with placebo or comparator drugs.Onset is usually with initiation of med, appears omeprazole is not new for pt.     Trazodone: Liver test abnormalities occur in a proportion of patients on trazodone, but elevations are usually modest and usually do not require dose modification or discontinuation. Rare instances of acute, clinically apparent episodes of liver injury with marked liver enzyme elevations with or without jaundice have been reported in patients on trazodone.     *Pt received one dose of fluconazole and new prescription for augmentin yesterday which both have low risk of increased LFTs, however labs appear to have been elevated yesterday before beginning these meds.       Info from livertox database - livertox.nlm.nih.gov        "

## 2017-08-25 NOTE — PLAN OF CARE
Problem: Goal Outcome Summary  Goal: Goal Outcome Summary  Outcome: No Change  Pt A&Ox4. VSS. CMS intact. No c/o pain. Ambulating independently and gait stable. She walked down the parker a few times today. IV abx administered. Pt takes enzyme medications on her own appropriately. No concerns with voiding.

## 2017-08-25 NOTE — ED NOTES
Patient presents to ED due positive blood cultures. States she was seen earlier today for cough and possible sinus infection. Was called due to positive cultures. ABC intact    Reports taking     Ibuprofen at 1800  Tylenol 1400

## 2017-08-25 NOTE — PROGRESS NOTES
No Charge Note: History and physical reviewed, patient examined    52 yr old female with h/o gastric bypass surgery, pancreatic transplant due to recurrent pancreatitis resulting in DM1 not on any immunosuppressants, crohn's disease s/p ileal resection with h/o recurrent SBO who presented on 8/24/2017 with generalized malaise, fevers, chills and nasal congestion. She was initially discharged from ER with augmentin for sinusitis however called back to the hospital due to +ve blood culture with E coli. No source of infection noted yet and no localizing signs.  She is on Suboxone for chronic pain.    Physical exam is unrevealing normal bowel sounds no tenderness no hepatosplenomegaly    Sepsis secondary to Escherichia coli bacteremia unclear source, abdominal ultrasound CT scan and urinalysis are negative  Continue broad-spectrum antibiotic coverage  Follow-up culture result  Infectious disease consulted  Follow-up on elevated liver function test today  Consult pharmacy for review of medication potentially causing hepatitis

## 2017-08-26 LAB
ALBUMIN SERPL-MCNC: 2.6 G/DL (ref 3.4–5)
ALP SERPL-CCNC: 211 U/L (ref 40–150)
ALT SERPL W P-5'-P-CCNC: 164 U/L (ref 0–50)
ANION GAP SERPL CALCULATED.3IONS-SCNC: 7 MMOL/L (ref 3–14)
AST SERPL W P-5'-P-CCNC: 101 U/L (ref 0–45)
BACTERIA SPEC CULT: ABNORMAL
BASOPHILS # BLD AUTO: 0.1 10E9/L (ref 0–0.2)
BASOPHILS NFR BLD AUTO: 1.8 %
BILIRUB DIRECT SERPL-MCNC: <0.1 MG/DL (ref 0–0.2)
BILIRUB SERPL-MCNC: 0.3 MG/DL (ref 0.2–1.3)
BUN SERPL-MCNC: 11 MG/DL (ref 7–30)
CALCIUM SERPL-MCNC: 8.1 MG/DL (ref 8.5–10.1)
CHLORIDE SERPL-SCNC: 112 MMOL/L (ref 94–109)
CO2 SERPL-SCNC: 24 MMOL/L (ref 20–32)
CREAT SERPL-MCNC: 0.67 MG/DL (ref 0.52–1.04)
DIFFERENTIAL METHOD BLD: ABNORMAL
EOSINOPHIL # BLD AUTO: 0.3 10E9/L (ref 0–0.7)
EOSINOPHIL NFR BLD AUTO: 4.5 %
ERYTHROCYTE [DISTWIDTH] IN BLOOD BY AUTOMATED COUNT: 13.3 % (ref 10–15)
GFR SERPL CREATININE-BSD FRML MDRD: >90 ML/MIN/1.7M2
GLUCOSE SERPL-MCNC: 80 MG/DL (ref 70–99)
HCT VFR BLD AUTO: 35.3 % (ref 35–47)
HGB BLD-MCNC: 11.5 G/DL (ref 11.7–15.7)
IMM GRANULOCYTES # BLD: 0 10E9/L (ref 0–0.4)
IMM GRANULOCYTES NFR BLD: 0.3 %
LYMPHOCYTES # BLD AUTO: 2.7 10E9/L (ref 0.8–5.3)
LYMPHOCYTES NFR BLD AUTO: 45.1 %
Lab: ABNORMAL
MAGNESIUM SERPL-MCNC: 1.8 MG/DL (ref 1.6–2.3)
MCH RBC QN AUTO: 30.3 PG (ref 26.5–33)
MCHC RBC AUTO-ENTMCNC: 32.6 G/DL (ref 31.5–36.5)
MCV RBC AUTO: 93 FL (ref 78–100)
MONOCYTES # BLD AUTO: 0.9 10E9/L (ref 0–1.3)
MONOCYTES NFR BLD AUTO: 14.4 %
NEUTROPHILS # BLD AUTO: 2 10E9/L (ref 1.6–8.3)
NEUTROPHILS NFR BLD AUTO: 33.9 %
NRBC # BLD AUTO: 0 10*3/UL
NRBC BLD AUTO-RTO: 0 /100
PLATELET # BLD AUTO: 273 10E9/L (ref 150–450)
POTASSIUM SERPL-SCNC: 3.9 MMOL/L (ref 3.4–5.3)
PROT SERPL-MCNC: 5.9 G/DL (ref 6.8–8.8)
RBC # BLD AUTO: 3.8 10E12/L (ref 3.8–5.2)
SODIUM SERPL-SCNC: 143 MMOL/L (ref 133–144)
SPECIMEN SOURCE: ABNORMAL
WBC # BLD AUTO: 6 10E9/L (ref 4–11)

## 2017-08-26 PROCEDURE — 80048 BASIC METABOLIC PNL TOTAL CA: CPT | Performed by: HOSPITALIST

## 2017-08-26 PROCEDURE — 25000125 ZZHC RX 250: Performed by: HOSPITALIST

## 2017-08-26 PROCEDURE — 80076 HEPATIC FUNCTION PANEL: CPT | Performed by: HOSPITALIST

## 2017-08-26 PROCEDURE — 25000128 H RX IP 250 OP 636: Performed by: HOSPITALIST

## 2017-08-26 PROCEDURE — 25000132 ZZH RX MED GY IP 250 OP 250 PS 637: Performed by: INTERNAL MEDICINE

## 2017-08-26 PROCEDURE — 25000132 ZZH RX MED GY IP 250 OP 250 PS 637

## 2017-08-26 PROCEDURE — 83735 ASSAY OF MAGNESIUM: CPT | Performed by: HOSPITALIST

## 2017-08-26 PROCEDURE — 85025 COMPLETE CBC W/AUTO DIFF WBC: CPT | Performed by: HOSPITALIST

## 2017-08-26 PROCEDURE — 25000128 H RX IP 250 OP 636: Performed by: INTERNAL MEDICINE

## 2017-08-26 PROCEDURE — 25000132 ZZH RX MED GY IP 250 OP 250 PS 637: Performed by: HOSPITALIST

## 2017-08-26 PROCEDURE — 25000131 ZZH RX MED GY IP 250 OP 636 PS 637

## 2017-08-26 PROCEDURE — 36415 COLL VENOUS BLD VENIPUNCTURE: CPT | Performed by: HOSPITALIST

## 2017-08-26 PROCEDURE — 99232 SBSQ HOSP IP/OBS MODERATE 35: CPT | Performed by: INTERNAL MEDICINE

## 2017-08-26 PROCEDURE — 12000000 ZZH R&B MED SURG/OB

## 2017-08-26 RX ORDER — CEFTRIAXONE 1 G/1
1 INJECTION, POWDER, FOR SOLUTION INTRAMUSCULAR; INTRAVENOUS EVERY 24 HOURS
Status: DISCONTINUED | OUTPATIENT
Start: 2017-08-26 | End: 2017-08-27 | Stop reason: ALTCHOICE

## 2017-08-26 RX ADMIN — BUSPIRONE HYDROCHLORIDE 30 MG: 10 TABLET ORAL at 07:55

## 2017-08-26 RX ADMIN — MODAFINIL 400 MG: 200 TABLET ORAL at 07:55

## 2017-08-26 RX ADMIN — OMEPRAZOLE 40 MG: 20 CAPSULE, DELAYED RELEASE ORAL at 19:46

## 2017-08-26 RX ADMIN — CEFTRIAXONE 1 G: 1 INJECTION, POWDER, FOR SOLUTION INTRAMUSCULAR; INTRAVENOUS at 15:56

## 2017-08-26 RX ADMIN — SODIUM CHLORIDE: 9 INJECTION, SOLUTION INTRAVENOUS at 00:08

## 2017-08-26 RX ADMIN — GABAPENTIN 100 MG: 100 CAPSULE ORAL at 13:57

## 2017-08-26 RX ADMIN — BUPRENORPHINE HYDROCHLORIDE, NALOXONE HYDROCHLORIDE 0.5 FILM: 2; .5 FILM, SOLUBLE BUCCAL; SUBLINGUAL at 07:57

## 2017-08-26 RX ADMIN — INSULIN GLARGINE 4 UNITS: 100 INJECTION, SOLUTION SUBCUTANEOUS at 19:46

## 2017-08-26 RX ADMIN — TAZOBACTAM SODIUM AND PIPERACILLIN SODIUM 3.38 G: 375; 3 INJECTION, SOLUTION INTRAVENOUS at 00:24

## 2017-08-26 RX ADMIN — SODIUM CHLORIDE: 9 INJECTION, SOLUTION INTRAVENOUS at 11:33

## 2017-08-26 RX ADMIN — NALOXEGOL OXALATE 25 MG: 25 TABLET, FILM COATED ORAL at 07:56

## 2017-08-26 RX ADMIN — Medication 2 G: at 17:10

## 2017-08-26 RX ADMIN — OMEPRAZOLE 40 MG: 20 CAPSULE, DELAYED RELEASE ORAL at 07:55

## 2017-08-26 RX ADMIN — BUPRENORPHINE HYDROCHLORIDE, NALOXONE HYDROCHLORIDE 0.25 FILM: 2; .5 FILM, SOLUBLE BUCCAL; SUBLINGUAL at 19:46

## 2017-08-26 RX ADMIN — GABAPENTIN 100 MG: 100 CAPSULE ORAL at 07:54

## 2017-08-26 RX ADMIN — ACETAMINOPHEN 650 MG: 325 TABLET, FILM COATED ORAL at 18:21

## 2017-08-26 RX ADMIN — ACETAMINOPHEN 650 MG: 325 TABLET, FILM COATED ORAL at 13:57

## 2017-08-26 RX ADMIN — SODIUM CHLORIDE: 9 INJECTION, SOLUTION INTRAVENOUS at 22:44

## 2017-08-26 RX ADMIN — GABAPENTIN 100 MG: 100 CAPSULE ORAL at 19:46

## 2017-08-26 RX ADMIN — SENNOSIDES 2 TABLET: 8.6 TABLET, FILM COATED ORAL at 19:46

## 2017-08-26 RX ADMIN — LEVOTHYROXINE SODIUM 75 MCG: 75 TABLET ORAL at 07:55

## 2017-08-26 RX ADMIN — TAZOBACTAM SODIUM AND PIPERACILLIN SODIUM 3.38 G: 375; 3 INJECTION, SOLUTION INTRAVENOUS at 06:15

## 2017-08-26 RX ADMIN — SENNOSIDES 2 TABLET: 8.6 TABLET, FILM COATED ORAL at 07:54

## 2017-08-26 RX ADMIN — DULOXETINE HYDROCHLORIDE 30 MG: 30 CAPSULE, DELAYED RELEASE PELLETS ORAL at 19:46

## 2017-08-26 RX ADMIN — TAZOBACTAM SODIUM AND PIPERACILLIN SODIUM 3.38 G: 375; 3 INJECTION, SOLUTION INTRAVENOUS at 11:33

## 2017-08-26 RX ADMIN — DULOXETINE HYDROCHLORIDE 30 MG: 30 CAPSULE, DELAYED RELEASE PELLETS ORAL at 07:55

## 2017-08-26 RX ADMIN — ACETAMINOPHEN 650 MG: 325 TABLET, FILM COATED ORAL at 06:15

## 2017-08-26 RX ADMIN — BUSPIRONE HYDROCHLORIDE 30 MG: 10 TABLET ORAL at 19:46

## 2017-08-26 NOTE — PROGRESS NOTES
" INFECTIOUS DISEASE Progress Note  August 26, 2017  [id number]  Lynn HENNING Thompson    ANTIBIOTICS:  zosyn    SUBJECTIVE Afebrile, notes reviewed, feels much better, no UTI sxs, no abd. Pain, no c/o  OBJECTIVE:  /55 (BP Location: Right arm)  Temp 97.9  F (36.6  C) (Oral)  Resp 16  Ht 1.626 m (5' 4\")  Wt 68 kg (150 lb)  LMP 12/19/2013  SpO2 96%  BMI 25.75 kg/m2  Alert, comfortable  Lung CTA  RRR  abd firm but baseline, ND, NT, + BS  No rash   No edema    LAB Data:  WBC 6  Creat 0.6  ,           MICROBIOLOGY:  BC 8/24 E.coli S CTX  UC NG    IMAGING:  CT FINDINGS: Pancreas, spleen, and a portion of the stomach are  surgically absent. Adrenal glands, kidneys, and liver demonstrate no  worrisome focal lesion. Large amount of stool throughout the colon.  There are no dilated loops of small intestine or large bowel to  suggest ileus or obstruction. No free air or free fluid. No  hydronephrosis. Stable focal sclerosis with a nonaggressive appearance  in the right ilium. Survey of the visualized bony structures  demonstrates no destructive bony lesions. Nonaneurysmal aorta. No  diverticulitis. The visualized lung bases are unremarkable.         IMPRESSION: No acute process demonstrated in the abdomen and pelvis.     Attestation:  I have reviewed today's vital signs, notes, medications, labs and imaging.    ASSESSMENT:  1. E.COLI BACTEREMIA-UC neg, likely hepatobiliary origin, ? Cholangitis,  doing well, LFTs better, CT neg  2. ABNORMAL LFTS-improving, suggests biliary source  3. H/O PANCREATITIS-lipase normal  4. H/O CROHN'S    REC  1. D/c zosyn  2. Ceftriaxone 1 g iv q 24 hours  3. If doing well likely ok to d/c tomorrow on ceftin 500 mg bid x 11 more days  4. F/u LFTs  Dr Phelan covering tomorrow, he will f/u prn    TRUE BILLINGSLEY M.D.  O:[phone]   B:[phone]          "

## 2017-08-26 NOTE — PLAN OF CARE
Problem: Goal Outcome Summary  Goal: Goal Outcome Summary  Outcome: No Change  Day RN  A/O.  VSS, afebrile.  No complaints of pain.  Independent.  Voiding adequately.  Tolerating regular diet, pt takes BG with own glucometer.  IV infusing, switched to IV Rocephin.  BC pending.  Will continue to monitor.

## 2017-08-26 NOTE — PLAN OF CARE
Problem: Goal Outcome Summary  Goal: Goal Outcome Summary  Outcome: Improving  A&O. VSS. Afebrile.Taking prn tylenol.On IV antibiotics. Independent in room, steady gait. Voiding adequately.

## 2017-08-26 NOTE — PLAN OF CARE
Problem: Goal Outcome Summary  Goal: Goal Outcome Summary  Outcome: Improving  Tolerating regular diet, takes enzymes with meals. Taking IV antibiotics. Transfers independently, up ad xenia in room. CMS intact. Voiding in adequate amounts. Pain managed with prn Tylenol. Plan of care ongoing and reviewed with patient.

## 2017-08-26 NOTE — PROGRESS NOTES
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 She was initially discharged from ER with augmentin for sinusitis however called back to the hospital due to +ve blood culture with E coli. No source of infection noted yet and no localizing signs.     Sepsis 2/2 E coli bacteremia: unknown source.  -- Only c/o generalized body ache and nasal congestion. UA -ve. Ct abd -ve for intra-abd pathology. LFTs are elevated but CT and US are -ve for acute pathology  --cont IV zosyn  --follow blood cultures  --ID consult appreciated will follow cultures     Transaminitis:   --LFTs were quite deranged however nothing acute noted on CT or US liver. ?acute phase reaction.   -- LFTS trending down  --She has had similar elevations in the past during times of illness which resolved w/o intervention  --cont to monitor      H/o crohns s/p ileal resection and h/o SBO: no e/o SBO on CT or on exam    H/o DM1 s/p pancreatic transplant: resume lantus 4 U an SSI    H/o gastric bypass surgery    Chronic pain syndrome on suboxone      DVT Prophylaxis: Pneumatic Compression Devices  Code Status: Full Code    Disposition: Expected discharge in 2-3 days to home if cultures remain negative      Interval History   Assumed care, Reviewed chart, Patient offering no complaints. Comprehensive Review of system was negative    -Data reviewed today: I reviewed all new labs and imaging reports over the last 24 hours. I personally reviewed no images or EKG's today.    Physical Exam   Temp: 97   F (36.1  C) Temp src: Oral BP: (!) 104/36   Heart Rate: 62 Resp: 16 SpO2: 98 % O2 Device: None (Room air)    Vitals:    08/25/17 0103   Weight: 68 kg (150 lb)     Vital Signs with Ranges  Temp:  [97  F (36.1  C)-98.9  F (37.2  C)] 97  F (36.1  C)  Heart Rate:  [54-62] 62  Resp:  [14-16] 16  BP: (104-123)/(36-53) 104/36  SpO2:  [97 %-98 %] 98 %  I/O last 3 completed shifts:  In: 3801 [P.O.:1540; I.V.:2261]  Out: -       GEN:  Alert, oriented x 3, appears comfortable, NAD.  HEENT:  Normocephalic/atraumatic, no scleral icterus, no nasal discharge, mouth moist.  CV:  Regular rate and rhythm, no murmur or JVD.  S1 + S2 noted, no S3 or S4.  LUNGS:  Clear to auscultation bilaterally without rales/rhonchi/wheezing/retractions.  Symmetric chest rise on inhalation noted.  ABD:  Active bowel sounds, soft, non-tender/non-distended.  No rebound/guarding/rigidity.  EXT:  No edema.  No cyanosis.  No joint synovitis noted.  SKIN:  Dry to touch, no exanthems noted in the visualized areas.    Medications     NaCl 100 mL/hr at 08/26/17 0008       amylase-lipase-protease  4-5 capsule Oral TID w/meals     busPIRone  30 mg Oral BID     DULoxetine  30 mg Oral BID     gabapentin  100 mg Oral TID     levothyroxine  75 mcg Oral Daily     modafinil  400 mg Oral Daily     omeprazole  40 mg Oral BID     sennosides  2 tablet Oral BID     polyethylene glycol  17 g Oral Daily     piperacillin-tazobactam  3.375 g Intravenous Q6H     naloxegol  25 mg Oral QAM AC     insulin glargine  4 Units Subcutaneous QPM     buprenorphine HCl-naloxone HCl  0.5 Film Sublingual QAM     buprenorphine HCl-naloxone HCl  0.25 Film Sublingual QPM     sodium chloride (PF)  3 mL Intracatheter Q8H     Data       Recent Labs  Lab 08/26/17  0559 08/24/17  2221 08/24/17  0613   WBC 6.0 7.7 13.2*   HGB 11.5* 13.4 13.7   HCT 35.3 41.2 42.4   MCV 93 93 93    327 354       Recent Labs  Lab 08/26/17  0559 08/24/17  0613    140   POTASSIUM 3.9 4.1   CHLORIDE 112* 103    CO2 24 32   ANIONGAP 7 5   GLC 80 83   BUN 11 18   CR 0.67 0.75   GFRESTIMATED >90 80   GFRESTBLACK >90 >90   VALARIE 8.1* 8.7       Recent Labs  Lab 08/25/17  0730 08/25/17  0720 08/24/17  2221 08/24/17  2205 08/24/17  0825 08/24/17  0710 08/24/17  0615   CULT No growth after 20 hours No growth after 19 hours No growth after 1 day No growth after 1 day No growth after 2 days <10,000 colonies/mLurogenital opal Cultured on the 1st day of incubation:Escherichia coli*  Critical Value/Significant Value, preliminary result only, called to and read back by DEREK LYNCH RN @5690 8/24/17. CT  (Note)POSITIVE for E. COLI by [employer] multiplex nucleic acid test. Finalidentification and antimicrobial susceptibility testing will beverified by standard methods.Specimen tested with Verigene multiplex, gram-negative blood culturenucleic acid test for the following targets: Acinetobacter sp.,Citrobacter sp., Enterobacter sp., Proteus sp., E. coli, K.pneumoniae/oxytoca, P. aeruginosa, and the following resistancemarkers: CTXM, KPC, NDM, VIM, IMP and OXA.Critical Value/Significant Value called to and read back by Salvatore RN @ 7216 8/24/17 CS       Recent Labs  Lab 08/26/17  0559 08/25/17  0720 08/24/17  2221   * 202* 337*   * 233* 314*   ALKPHOS 211* 229* 271*   BILITOTAL 0.3 0.4 0.4     No results for input(s): INR in the last 168 hours.    Recent Labs  Lab 08/24/17  0710   COLOR Yellow   APPEARANCE Clear   URINEGLC Negative   URINEBILI Negative   URINEKETONE Negative   SG 1.021   UBLD Negative   URINEPH 6.0   PROTEIN Negative   NITRITE Negative   LEUKEST Negative   RBCU 3*   WBCU 1       No results found for this or any previous visit (from the past 24 hour(s)).

## 2017-08-27 LAB
ALBUMIN SERPL-MCNC: 2.5 G/DL (ref 3.4–5)
ALP SERPL-CCNC: 194 U/L (ref 40–150)
ALT SERPL W P-5'-P-CCNC: 121 U/L (ref 0–50)
ANION GAP SERPL CALCULATED.3IONS-SCNC: 5 MMOL/L (ref 3–14)
AST SERPL W P-5'-P-CCNC: 54 U/L (ref 0–45)
BASOPHILS # BLD AUTO: 0.1 10E9/L (ref 0–0.2)
BASOPHILS NFR BLD AUTO: 1.1 %
BILIRUB DIRECT SERPL-MCNC: <0.1 MG/DL (ref 0–0.2)
BILIRUB SERPL-MCNC: 0.2 MG/DL (ref 0.2–1.3)
BUN SERPL-MCNC: 10 MG/DL (ref 7–30)
C DIFF TOX B STL QL: NEGATIVE
CALCIUM SERPL-MCNC: 8.3 MG/DL (ref 8.5–10.1)
CHLORIDE SERPL-SCNC: 113 MMOL/L (ref 94–109)
CO2 SERPL-SCNC: 26 MMOL/L (ref 20–32)
CREAT SERPL-MCNC: 0.62 MG/DL (ref 0.52–1.04)
DIFFERENTIAL METHOD BLD: ABNORMAL
EOSINOPHIL # BLD AUTO: 0.2 10E9/L (ref 0–0.7)
EOSINOPHIL NFR BLD AUTO: 3.5 %
ERYTHROCYTE [DISTWIDTH] IN BLOOD BY AUTOMATED COUNT: 13.5 % (ref 10–15)
GFR SERPL CREATININE-BSD FRML MDRD: >90 ML/MIN/1.7M2
GLUCOSE SERPL-MCNC: 75 MG/DL (ref 70–99)
HCT VFR BLD AUTO: 33.7 % (ref 35–47)
HGB BLD-MCNC: 10.7 G/DL (ref 11.7–15.7)
IMM GRANULOCYTES # BLD: 0 10E9/L (ref 0–0.4)
IMM GRANULOCYTES NFR BLD: 0.3 %
LYMPHOCYTES # BLD AUTO: 2.8 10E9/L (ref 0.8–5.3)
LYMPHOCYTES NFR BLD AUTO: 42.9 %
MAGNESIUM SERPL-MCNC: 1.9 MG/DL (ref 1.6–2.3)
MCH RBC QN AUTO: 29.9 PG (ref 26.5–33)
MCHC RBC AUTO-ENTMCNC: 31.8 G/DL (ref 31.5–36.5)
MCV RBC AUTO: 94 FL (ref 78–100)
MONOCYTES # BLD AUTO: 0.7 10E9/L (ref 0–1.3)
MONOCYTES NFR BLD AUTO: 11.3 %
NEUTROPHILS # BLD AUTO: 2.7 10E9/L (ref 1.6–8.3)
NEUTROPHILS NFR BLD AUTO: 40.9 %
NRBC # BLD AUTO: 0 10*3/UL
NRBC BLD AUTO-RTO: 0 /100
PLATELET # BLD AUTO: 269 10E9/L (ref 150–450)
POTASSIUM SERPL-SCNC: 3.9 MMOL/L (ref 3.4–5.3)
PROT SERPL-MCNC: 5.6 G/DL (ref 6.8–8.8)
RBC # BLD AUTO: 3.58 10E12/L (ref 3.8–5.2)
SODIUM SERPL-SCNC: 144 MMOL/L (ref 133–144)
SPECIMEN SOURCE: NORMAL
WBC # BLD AUTO: 6.5 10E9/L (ref 4–11)

## 2017-08-27 PROCEDURE — 99232 SBSQ HOSP IP/OBS MODERATE 35: CPT | Performed by: INTERNAL MEDICINE

## 2017-08-27 PROCEDURE — 25000132 ZZH RX MED GY IP 250 OP 250 PS 637: Performed by: HOSPITALIST

## 2017-08-27 PROCEDURE — 36415 COLL VENOUS BLD VENIPUNCTURE: CPT | Performed by: INTERNAL MEDICINE

## 2017-08-27 PROCEDURE — 25000131 ZZH RX MED GY IP 250 OP 636 PS 637

## 2017-08-27 PROCEDURE — 85025 COMPLETE CBC W/AUTO DIFF WBC: CPT | Performed by: INTERNAL MEDICINE

## 2017-08-27 PROCEDURE — 80048 BASIC METABOLIC PNL TOTAL CA: CPT | Performed by: INTERNAL MEDICINE

## 2017-08-27 PROCEDURE — 25000128 H RX IP 250 OP 636: Performed by: HOSPITALIST

## 2017-08-27 PROCEDURE — 12000000 ZZH R&B MED SURG/OB

## 2017-08-27 PROCEDURE — 25000132 ZZH RX MED GY IP 250 OP 250 PS 637: Performed by: INTERNAL MEDICINE

## 2017-08-27 PROCEDURE — 83735 ASSAY OF MAGNESIUM: CPT | Performed by: INTERNAL MEDICINE

## 2017-08-27 PROCEDURE — 25000132 ZZH RX MED GY IP 250 OP 250 PS 637

## 2017-08-27 PROCEDURE — 25000125 ZZHC RX 250: Performed by: HOSPITALIST

## 2017-08-27 PROCEDURE — 80076 HEPATIC FUNCTION PANEL: CPT | Performed by: INTERNAL MEDICINE

## 2017-08-27 PROCEDURE — 87493 C DIFF AMPLIFIED PROBE: CPT | Performed by: INTERNAL MEDICINE

## 2017-08-27 RX ORDER — CEFUROXIME AXETIL 250 MG/1
500 TABLET ORAL EVERY 12 HOURS SCHEDULED
Status: DISCONTINUED | OUTPATIENT
Start: 2017-08-27 | End: 2017-08-28 | Stop reason: HOSPADM

## 2017-08-27 RX ADMIN — BUPRENORPHINE HYDROCHLORIDE, NALOXONE HYDROCHLORIDE 1 FILM: 2; .5 FILM, SOLUBLE BUCCAL; SUBLINGUAL at 20:23

## 2017-08-27 RX ADMIN — GABAPENTIN 100 MG: 100 CAPSULE ORAL at 20:22

## 2017-08-27 RX ADMIN — NALOXEGOL OXALATE 25 MG: 25 TABLET, FILM COATED ORAL at 06:54

## 2017-08-27 RX ADMIN — BUSPIRONE HYDROCHLORIDE 30 MG: 10 TABLET ORAL at 08:44

## 2017-08-27 RX ADMIN — ACETAMINOPHEN 650 MG: 325 TABLET, FILM COATED ORAL at 00:05

## 2017-08-27 RX ADMIN — OMEPRAZOLE 40 MG: 20 CAPSULE, DELAYED RELEASE ORAL at 08:45

## 2017-08-27 RX ADMIN — ACETAMINOPHEN 650 MG: 325 TABLET, FILM COATED ORAL at 13:20

## 2017-08-27 RX ADMIN — LEVOTHYROXINE SODIUM 75 MCG: 75 TABLET ORAL at 08:44

## 2017-08-27 RX ADMIN — TRAZODONE HYDROCHLORIDE 50 MG: 50 TABLET ORAL at 00:05

## 2017-08-27 RX ADMIN — DULOXETINE HYDROCHLORIDE 30 MG: 30 CAPSULE, DELAYED RELEASE PELLETS ORAL at 20:22

## 2017-08-27 RX ADMIN — BUSPIRONE HYDROCHLORIDE 30 MG: 10 TABLET ORAL at 20:23

## 2017-08-27 RX ADMIN — OMEPRAZOLE 40 MG: 20 CAPSULE, DELAYED RELEASE ORAL at 20:22

## 2017-08-27 RX ADMIN — MODAFINIL 400 MG: 200 TABLET ORAL at 08:44

## 2017-08-27 RX ADMIN — GABAPENTIN 100 MG: 100 CAPSULE ORAL at 08:45

## 2017-08-27 RX ADMIN — DULOXETINE HYDROCHLORIDE 30 MG: 30 CAPSULE, DELAYED RELEASE PELLETS ORAL at 08:45

## 2017-08-27 RX ADMIN — ACETAMINOPHEN 650 MG: 325 TABLET, FILM COATED ORAL at 08:45

## 2017-08-27 RX ADMIN — BUPRENORPHINE HYDROCHLORIDE, NALOXONE HYDROCHLORIDE 0.5 FILM: 2; .5 FILM, SOLUBLE BUCCAL; SUBLINGUAL at 08:44

## 2017-08-27 RX ADMIN — SODIUM CHLORIDE: 9 INJECTION, SOLUTION INTRAVENOUS at 18:45

## 2017-08-27 RX ADMIN — CEFUROXIME AXETIL 500 MG: 250 TABLET ORAL at 20:22

## 2017-08-27 RX ADMIN — INSULIN GLARGINE 4 UNITS: 100 INJECTION, SOLUTION SUBCUTANEOUS at 20:25

## 2017-08-27 RX ADMIN — GABAPENTIN 100 MG: 100 CAPSULE ORAL at 13:21

## 2017-08-27 RX ADMIN — Medication 2 G: at 09:32

## 2017-08-27 RX ADMIN — ACETAMINOPHEN 650 MG: 325 TABLET, FILM COATED ORAL at 18:36

## 2017-08-27 NOTE — PLAN OF CARE
Problem: Goal Outcome Summary  Goal: Goal Outcome Summary  Outcome: Improving  VSS. Magnesium replaced this evening; recheck placed for the AM. IV infusing. Tolerating a regular diet. Lungs clear. BS active;passing gas. Voiding in the bathroom. Pt took tylenol for a headache. Pt ambulating IND in halls and in her room. Pt and family anticipating possible dc tomorrow depending on blood cx results.

## 2017-08-27 NOTE — PROGRESS NOTES
[facility]    Hospitalist Progress Note  Name: Lynn Thompson    MRN: [id number]  Provider: Tracy Alvarado MD  Date of Service: 08/27/2017    Assessment & Plan   Summary of Stay: Lynn Thompson is a 52 year old female  with h/o gastric bypass surgery, pancreatic transplant due to recurrent pancreatitis resulting in DM1 not on any immunosuppressants, crohn's disease s/p ileal resection with h/o recurrent SBO who presented on 8/24/2017 with generalized malaise, fevers, chills and nasal congestion. She was initially discharged from ER with augmentin for sinusitis however called back to the hospital due to +ve blood culture with E coli. No source of infection noted yet and no localizing signs.     Sepsis 2/2 E coli bacteremia: unknown source. Possibly cholangitis  -- possibly Cholangitis: Ecoli + pt has h/o chrons and abnormal LFTs  -- Only c/o generalized body ache and nasal congestion. UA -ve. Ct abd -ve for intra-abd pathology. LFTs are elevated but CT and US are -ve for acute pathology  --Switched to Ceftriaxone by ID after initial Zosyn and to PO ceftin for 11 days more  --follow blood cultures  --ID consult appreciated will follow cultures    LOOSE STOOLS  -- 3 Episodes today  -- will check C diff  -- Dc laxatives  -- contact precautions     Transaminitis:   --LFTs were quite deranged however nothing acute noted on CT or US liver. ?acute phase reaction.   -- LFTS trending down  --She has had similar elevations in the past during times of illness which resolved w/o intervention  --cont to monitor   -- pt will need outpt pt follow up with GI      H/o crohns s/p ileal resection and h/o SBO: no e/o SBO on CT or on exam    H/o DM1 s/p pancreatic transplant: resume lantus 4 U an SSI    H/o gastric bypass surgery    Chronic pain syndrome on suboxone      DVT Prophylaxis: Pneumatic Compression Devices  Code Status: Full Code    Disposition: Expected discharge in 102 days to home if  No more loose  stools and c diff negative      Interval History   C/o three loose stools today, no abd pain, no fever. Comprehensive Review of system was negative    -Data reviewed today: I reviewed all new labs and imaging reports over the last 24 hours. I personally reviewed no images or EKG's today.    Physical Exam   Temp: 97.9  F (36.6  C) Temp src: Oral BP: 110/43   Heart Rate: 62 Resp: 16 SpO2: 97 % O2 Device: None (Room air)    Vitals:    08/25/17 0103   Weight: 68 kg (150 lb)     Vital Signs with Ranges  Temp:  [96.4  F (35.8  C)-97.9  F (36.6  C)] 97.9  F (36.6  C)  Heart Rate:  [52-62] 62  Resp:  [14-16] 16  BP: (110-139)/(43-74) 110/43  SpO2:  [95 %-98 %] 97 %  I/O last 3 completed shifts:  In: 2183 [P.O.:800; I.V.:1383]  Out: -       GEN:  Alert, oriented x 3, appears comfortable, NAD.  HEENT:  Normocephalic/atraumatic, no scleral icterus, no nasal discharge, mouth moist.  CV:  Regular rate and rhythm, no murmur or JVD.  S1 + S2 noted, no S3 or S4.  LUNGS:  Clear to auscultation bilaterally without rales/rhonchi/wheezing/retractions.  Symmetric chest rise on inhalation noted.  ABD:  Active bowel sounds, soft, non-tender/non-distended.  No rebound/guarding/rigidity.  EXT:  No edema.  No cyanosis.  No joint synovitis noted.  SKIN:  Dry to touch, no exanthems noted in the visualized areas.    Medications     NaCl 100 mL/hr at 08/26/17 2244       cefTRIAXone  1 g Intravenous Q24H     amylase-lipase-protease  4-5 capsule Oral TID w/meals     busPIRone  30 mg Oral BID     DULoxetine  30 mg Oral BID     gabapentin  100 mg Oral TID     levothyroxine  75 mcg Oral Daily     modafinil  400 mg Oral Daily     omeprazole  40 mg Oral BID     sennosides  2 tablet Oral BID     polyethylene glycol  17 g Oral Daily     naloxegol  25 mg Oral QAM AC     insulin glargine  4 Units Subcutaneous QPM     buprenorphine HCl-naloxone HCl  0.5 Film Sublingual QAM     buprenorphine HCl-naloxone HCl  0.25 Film Sublingual QPM     sodium chloride (PF)   3 mL Intracatheter Q8H     Data       Recent Labs  Lab 08/27/17  0600 08/26/17  0559 08/24/17  2221   WBC 6.5 6.0 7.7   HGB 10.7* 11.5* 13.4   HCT 33.7* 35.3 41.2   MCV 94 93 93    273 327       Recent Labs  Lab 08/27/17  0600 08/26/17  0559 08/24/17  0613    143 140   POTASSIUM 3.9 3.9 4.1   CHLORIDE 113* 112* 103   CO2 26 24 32   ANIONGAP 5 7 5   GLC 75 80 83   BUN 10 11 18   CR 0.62 0.67 0.75   GFRESTIMATED >90 >90 80   GFRESTBLACK >90 >90 >90   VALARIE 8.3* 8.1* 8.7       Recent Labs  Lab 08/25/17  0730 08/25/17  0720 08/24/17  2221 08/24/17  2205 08/24/17  0825 08/24/17  0710 08/24/17  0615   CULT No growth after 2 days No growth after 2 days No growth after 2 days No growth after 2 days No growth after 3 days <10,000 colonies/mLurogenital opal Cultured on the 1st day of incubation:Escherichia coli*  Critical Value/Significant Value, preliminary result only, called to and read back by DEREK LYNCH RN @1930 8/24/17. CT  (Note)POSITIVE for E. COLI by [employer] multiplex nucleic acid test. Finalidentification and antimicrobial susceptibility testing will beverified by standard methods.Specimen tested with Verigene multiplex, gram-negative blood culturenucleic acid test for the following targets: Acinetobacter sp.,Citrobacter sp., Enterobacter sp., Proteus sp., E. coli, K.pneumoniae/oxytoca, P. aeruginosa, and the following resistancemarkers: CTXM, KPC, NDM, VIM, IMP and OXA.Critical Value/Significant Value called to and read back by Salvatore RN @ 2145 8/24/17        Recent Labs  Lab 08/27/17  0600 08/26/17  0559 08/25/17  0720   AST 54* 101* 202*   * 164* 233*   ALKPHOS 194* 211* 229*   BILITOTAL 0.2 0.3 0.4     No results for input(s): INR in the last 168 hours.    Recent Labs  Lab 08/24/17  0710   COLOR Yellow   APPEARANCE Clear   URINEGLC Negative   URINEBILI Negative   URINEKETONE Negative   SG 1.021   UBLD Negative   URINEPH 6.0   PROTEIN Negative   NITRITE Negative   LEUKEST Negative    RBCU 3*   WBCU 1       No results found for this or any previous visit (from the past 24 hour(s)).

## 2017-08-27 NOTE — PLAN OF CARE
Problem: Goal Outcome Summary  Goal: Goal Outcome Summary  Outcome: Improving  Pt up ind in room. Reg diet. LS clear. BS active. Passing flatus. Denies pain. VSS. Waiting for blood cultures. Slept entire shift. Will continue to monitor.

## 2017-08-27 NOTE — PLAN OF CARE
Problem: Goal Outcome Summary  Goal: Goal Outcome Summary  Outcome: Improving  Day RN  A/O.  VSS, afebrile.  Complaints of generalized achyness, relieved with tylenol.  Up independently in room and halls.  Voiding adequately.  4 loose BM's this shift, c-diff specimen sent to lab.  Continues on IV Rocephin.  BC pending.  Mg replaced.  Possible DC to home later this afternoon.  Will continue to monitor.

## 2017-08-28 VITALS
RESPIRATION RATE: 16 BRPM | DIASTOLIC BLOOD PRESSURE: 71 MMHG | WEIGHT: 150 LBS | HEIGHT: 64 IN | SYSTOLIC BLOOD PRESSURE: 138 MMHG | TEMPERATURE: 98.2 F | BODY MASS INDEX: 25.61 KG/M2 | OXYGEN SATURATION: 99 %

## 2017-08-28 PROBLEM — T36.95XA ANTIBIOTIC-ASSOCIATED DIARRHEA: Status: ACTIVE | Noted: 2017-08-28

## 2017-08-28 PROBLEM — K52.1 ANTIBIOTIC-ASSOCIATED DIARRHEA: Status: ACTIVE | Noted: 2017-08-28

## 2017-08-28 PROBLEM — R79.89 ELEVATED LFTS: Status: ACTIVE | Noted: 2017-08-28

## 2017-08-28 LAB
ANION GAP SERPL CALCULATED.3IONS-SCNC: 6 MMOL/L (ref 3–14)
BUN SERPL-MCNC: 7 MG/DL (ref 7–30)
CALCIUM SERPL-MCNC: 8 MG/DL (ref 8.5–10.1)
CHLORIDE SERPL-SCNC: 113 MMOL/L (ref 94–109)
CO2 SERPL-SCNC: 26 MMOL/L (ref 20–32)
CREAT SERPL-MCNC: 0.66 MG/DL (ref 0.52–1.04)
GFR SERPL CREATININE-BSD FRML MDRD: >90 ML/MIN/1.7M2
GLUCOSE SERPL-MCNC: 71 MG/DL (ref 70–99)
MAGNESIUM SERPL-MCNC: 1.9 MG/DL (ref 1.6–2.3)
POTASSIUM SERPL-SCNC: 3.9 MMOL/L (ref 3.4–5.3)
SODIUM SERPL-SCNC: 145 MMOL/L (ref 133–144)

## 2017-08-28 PROCEDURE — 25000132 ZZH RX MED GY IP 250 OP 250 PS 637: Performed by: HOSPITALIST

## 2017-08-28 PROCEDURE — 25000132 ZZH RX MED GY IP 250 OP 250 PS 637: Performed by: INTERNAL MEDICINE

## 2017-08-28 PROCEDURE — 99238 HOSP IP/OBS DSCHRG MGMT 30/<: CPT | Performed by: INTERNAL MEDICINE

## 2017-08-28 PROCEDURE — 36415 COLL VENOUS BLD VENIPUNCTURE: CPT | Performed by: INTERNAL MEDICINE

## 2017-08-28 PROCEDURE — 80048 BASIC METABOLIC PNL TOTAL CA: CPT | Performed by: INTERNAL MEDICINE

## 2017-08-28 PROCEDURE — 83735 ASSAY OF MAGNESIUM: CPT | Performed by: INTERNAL MEDICINE

## 2017-08-28 PROCEDURE — 25000132 ZZH RX MED GY IP 250 OP 250 PS 637

## 2017-08-28 PROCEDURE — 25000128 H RX IP 250 OP 636: Performed by: HOSPITALIST

## 2017-08-28 RX ORDER — CEFUROXIME AXETIL 500 MG/1
500 TABLET ORAL EVERY 12 HOURS
Qty: 22 TABLET | Refills: 0 | Status: SHIPPED | OUTPATIENT
Start: 2017-08-28 | End: 2017-09-08

## 2017-08-28 RX ORDER — L. ACIDOPHILUS/L.BULGARICUS 1MM CELL
1 TABLET ORAL 3 TIMES DAILY
Qty: 42 TABLET | Refills: 0 | Status: SHIPPED | OUTPATIENT
Start: 2017-08-28 | End: 2017-09-01

## 2017-08-28 RX ADMIN — ACETAMINOPHEN 650 MG: 325 TABLET, FILM COATED ORAL at 00:15

## 2017-08-28 RX ADMIN — MODAFINIL 400 MG: 200 TABLET ORAL at 08:44

## 2017-08-28 RX ADMIN — GABAPENTIN 100 MG: 100 CAPSULE ORAL at 08:44

## 2017-08-28 RX ADMIN — BUPRENORPHINE HYDROCHLORIDE, NALOXONE HYDROCHLORIDE 0.25 FILM: 2; .5 FILM, SOLUBLE BUCCAL; SUBLINGUAL at 08:48

## 2017-08-28 RX ADMIN — CEFUROXIME AXETIL 500 MG: 250 TABLET ORAL at 08:43

## 2017-08-28 RX ADMIN — BUSPIRONE HYDROCHLORIDE 30 MG: 10 TABLET ORAL at 08:44

## 2017-08-28 RX ADMIN — NALOXEGOL OXALATE 25 MG: 25 TABLET, FILM COATED ORAL at 07:06

## 2017-08-28 RX ADMIN — OMEPRAZOLE 40 MG: 20 CAPSULE, DELAYED RELEASE ORAL at 08:44

## 2017-08-28 RX ADMIN — TRAZODONE HYDROCHLORIDE 50 MG: 50 TABLET ORAL at 00:15

## 2017-08-28 RX ADMIN — DULOXETINE HYDROCHLORIDE 30 MG: 30 CAPSULE, DELAYED RELEASE PELLETS ORAL at 08:44

## 2017-08-28 RX ADMIN — GABAPENTIN 100 MG: 100 CAPSULE ORAL at 14:01

## 2017-08-28 RX ADMIN — LEVOTHYROXINE SODIUM 75 MCG: 75 TABLET ORAL at 08:44

## 2017-08-28 RX ADMIN — SODIUM CHLORIDE: 9 INJECTION, SOLUTION INTRAVENOUS at 05:27

## 2017-08-28 NOTE — PLAN OF CARE
Problem: Individualization  Goal: Patient Preferences  Outcome: Adequate for Discharge Date Met:  08/28/17  Pt being discharged to home today explained DC paperwork including s/s to monitor for, diet, activity, and New medications were discussed and general questions answered.  Pt will follow up with MD as directed. Going home with oral antibiotics. No other issues at time of dc. Pt ready for dc.

## 2017-08-28 NOTE — DISCHARGE SUMMARY
[facility] Discharge Summary    Lynn Thompson MRN# [id number]   Age: 52 year old YOB: 1965     Date of Admission:  8/24/2017  Date of Discharge::  8/28/2017  Admitting Physician:  Tania Sotelo MD  Discharge Physician:  Matheus Hickman MD     Home clinic: [facility]          Admission Diagnoses:   Elevated LFTs [R79.89]  Positive blood culture [R78.81]          Discharge Diagnosis:   Principal Problem:    E coli bacteremia  Active Problems:    Gastric bypass status for obesity    Pancreatic insufficiency    Exocrine pancreatic insufficiency    Asplenia    Post-pancreatectomy diabetes (H)    Antibiotic-associated diarrhea    Elevated LFTs            Procedures:   CT Abd/pelvis  Abd Ultrasound  CXR          Medications Prior to Admission:     Prescriptions Prior to Admission   Medication Sig Dispense Refill Last Dose     buprenorphine HCl-naloxone HCl (SUBOXONE) 2-0.5 MG per film Place 0.25 Film under the tongue every evening   Past Week at Unknown time     DOCUSATE SODIUM PO Take 100 mg by mouth daily   8/24/2017 at Unknown time     GABAPENTIN PO Take 100 mg by mouth 3 times daily   8/24/2017 at Unknown time     TRAZODONE HCL PO Take 50 mg by mouth nightly as needed for sleep   Past Week at Unknown time     naloxegol (MOVANTIK) 25 MG TABS tablet Take 25 mg by mouth every morning (before breakfast)   8/24/2017 at Unknown time     naloxone (NARCAN) nasal spray Spray 4 mg in nostril as needed for opioid reversal    at prn     CLONIDINE HCL PO Take 0.1 mg by mouth 2 times daily as needed    at prn     ACETAMINOPHEN PO Take 325 mg by mouth every 6 hours as needed for pain   Past Week at Unknown time     busPIRone (BUSPAR) 15 MG tablet Increased dose. 30 mg two times per day. 120 tablet 3 8/24/2017 at Unknown time     amylase-lipase-protease (VIOKACE) 78588 UNITS TABS tablet 4-5 aps with meals and 1-2 with snacks 600 tablet 5 8/25/2017 at am     insulin glargine (LANTUS  SOLOSTAR) 100 UNIT/ML injection Inject 4 Units Subcutaneous every 24 hours (Patient taking differently: Inject 4 Units Subcutaneous every evening ) 15 mL 1 8/24/2017 at 7pm     levothyroxine (SYNTHROID/LEVOTHROID) 75 MCG tablet Take 1 tablet (75 mcg) by mouth daily 90 tablet 1 8/24/2017 at Unknown time     buprenorphine HCl-naloxone HCl (SUBOXONE) 2-0.5 MG per film Place 0.5 Film under the tongue every morning    8/24/2017 at Unknown time     DULoxetine (CYMBALTA) 30 MG EC capsule Take 1 capsule (30 mg) by mouth 2 times daily Fill at patient request. 180 capsule 1 8/24/2017 at Unknown time     omeprazole (PRILOSEC) 40 MG capsule Take 1 capsule (40 mg) by mouth 2 times daily Take 30-60 minutes before a meal. 180 capsule 3 8/24/2017 at Unknown time     estradiol (ESTRACE VAGINAL) 0.1 MG/GM vaginal cream Place 2 g vaginally twice a week 42.5 g 11 Past Week at Unknown time     sennosides (SENOKOT) 8.6 MG tablet Take 2 tablets by mouth 2 times daily 120 tablet 1 8/24/2017 at Unknown time     Injection Device for insulin (NOVOPEN JRHUNTER,) MODESTA 1 Device Inject 1 unit (which is marked as a 1/2 unit on the pen itself)  as needed when eating carb heavy meals.   Past Week at Unknown time     loratadine (CLARITIN) 10 MG tablet Take 10 mg by mouth daily as needed    Past Week at Unknown time     polyethylene glycol (MIRALAX/GLYCOLAX) packet Take 17 g by mouth daily 7 packet 0 8/24/2017 at Unknown time     modafinil (PROVIGIL) 200 MG tablet Take 2 tablets by mouth Once a Day   8/24/2017 at Unknown time     glucose 40 % GEL Take 15-30 g by mouth every 15 minutes as needed. 1 Tube 11  at prn     [DISCONTINUED] amoxicillin-clavulanate (AUGMENTIN) 875-125 MG per tablet Take 1 tablet by mouth 2 times daily for 7 days 14 tablet 0 8/24/2017 at Unknown time     insulin pen needle ([employer] MAHESH U/F) 32G X 4 MM Use up to 3 times daily as needed for insulin dosing 100 each prn      blood glucose (BILL MICROLET 2) lancing device Use to test  blood sugars 6 times daily or as directed. 1 each 11 Taking     blood glucose monitoring (BILL CONTOUR NEXT) test strip Check BS 4-6 times a day 2 Box 6 Taking     NOVOLOG PENFILL SOLN 100 UNIT/ML Take as directed 3 Month 1 Taking     Sharps Container MISC For insulin needles 1 each prn Taking             Discharge Medications:     Current Discharge Medication List      START taking these medications    Details   cefuroxime (CEFTIN) 500 MG tablet Take 1 tablet (500 mg) by mouth every 12 hours for 11 days  Qty: 22 tablet, Refills: 0    Associated Diagnoses: E coli bacteremia      lactobacillus TABS Take 1 tablet by mouth 3 times daily for 14 days  Qty: 42 tablet, Refills: 0    Associated Diagnoses: Antibiotic-associated diarrhea         CONTINUE these medications which have NOT CHANGED    Details   !! buprenorphine HCl-naloxone HCl (SUBOXONE) 2-0.5 MG per film Place 0.25 Film under the tongue every evening      DOCUSATE SODIUM PO Take 100 mg by mouth daily      GABAPENTIN PO Take 100 mg by mouth 3 times daily      TRAZODONE HCL PO Take 50 mg by mouth nightly as needed for sleep      naloxegol (MOVANTIK) 25 MG TABS tablet Take 25 mg by mouth every morning (before breakfast)      naloxone (NARCAN) nasal spray Spray 4 mg in nostril as needed for opioid reversal      CLONIDINE HCL PO Take 0.1 mg by mouth 2 times daily as needed      ACETAMINOPHEN PO Take 325 mg by mouth every 6 hours as needed for pain      busPIRone (BUSPAR) 15 MG tablet Increased dose. 30 mg two times per day.  Qty: 120 tablet, Refills: 3    Associated Diagnoses: Anxiety      amylase-lipase-protease (VIOKACE) 29285 UNITS TABS tablet 4-5 aps with meals and 1-2 with snacks  Qty: 600 tablet, Refills: 5    Associated Diagnoses: Pancreatic insufficiency      insulin glargine (LANTUS SOLOSTAR) 100 UNIT/ML injection Inject 4 Units Subcutaneous every 24 hours  Qty: 15 mL, Refills: 1    Comments: May substitute Basaglar if Lantus is not on  formulary.  Associated Diagnoses: Post-pancreatectomy diabetes (H)      levothyroxine (SYNTHROID/LEVOTHROID) 75 MCG tablet Take 1 tablet (75 mcg) by mouth daily  Qty: 90 tablet, Refills: 1    Associated Diagnoses: Acquired hypothyroidism      !! buprenorphine HCl-naloxone HCl (SUBOXONE) 2-0.5 MG per film Place 0.5 Film under the tongue every morning       DULoxetine (CYMBALTA) 30 MG EC capsule Take 1 capsule (30 mg) by mouth 2 times daily Fill at patient request.  Qty: 180 capsule, Refills: 1    Associated Diagnoses: Anxiety      omeprazole (PRILOSEC) 40 MG capsule Take 1 capsule (40 mg) by mouth 2 times daily Take 30-60 minutes before a meal.  Qty: 180 capsule, Refills: 3    Associated Diagnoses: Gastroesophageal reflux disease without esophagitis; Nausea; Gastric bypass status for obesity      estradiol (ESTRACE VAGINAL) 0.1 MG/GM vaginal cream Place 2 g vaginally twice a week  Qty: 42.5 g, Refills: 11    Associated Diagnoses: Vaginal atrophy      sennosides (SENOKOT) 8.6 MG tablet Take 2 tablets by mouth 2 times daily  Qty: 120 tablet, Refills: 1    Associated Diagnoses: Other constipation      Injection Device for insulin (NOVOPEN JRHUNTER,) MODESTA 1 Device Inject 1 unit (which is marked as a 1/2 unit on the pen itself)  as needed when eating carb heavy meals.      loratadine (CLARITIN) 10 MG tablet Take 10 mg by mouth daily as needed       polyethylene glycol (MIRALAX/GLYCOLAX) packet Take 17 g by mouth daily  Qty: 7 packet, Refills: 0    Associated Diagnoses: Constipation      modafinil (PROVIGIL) 200 MG tablet Take 2 tablets by mouth Once a Day      glucose 40 % GEL Take 15-30 g by mouth every 15 minutes as needed.  Qty: 1 Tube, Refills: 11    Associated Diagnoses: Chronic pancreatitis (H)      insulin pen needle (BD MAHESH U/F) 32G X 4 MM Use up to 3 times daily as needed for insulin dosing  Qty: 100 each, Refills: prn    Associated Diagnoses: Acquired absence of pancreas      blood glucose (BILL MICROLET 2)  "lancing device Use to test blood sugars 6 times daily or as directed.  Qty: 1 each, Refills: 11    Associated Diagnoses: Absence of pancreas, acquired      blood glucose monitoring (BILL CONTOUR NEXT) test strip Check BS 4-6 times a day  Qty: 2 Box, Refills: 6    Associated Diagnoses: Absence of pancreas, acquired      NOVOLOG PENFILL SOLN 100 UNIT/ML Take as directed  Qty: 3 Month, Refills: 1    Associated Diagnoses: Diabetes mellitus type 1 (H)      Sharps Container MISC For insulin needles  Qty: 1 each, Refills: prn    Associated Diagnoses: Absence of pancreas, acquired       !! - Potential duplicate medications found. Please discuss with provider.      STOP taking these medications       amoxicillin-clavulanate (AUGMENTIN) 875-125 MG per tablet Comments:   Reason for Stopping:                     Consultations:   Consultation during this admission received from infectious disease           Hospital Course:   Lynn Thompson is a 52 year old female with h/o gastric bypass surgery, pancreatic islet-cell auto-transplant due to recurrent pancreatitis resulting in DM1 (but not not on any immunosuppressants), remote hx crohn's disease s/p ileal resection with h/o recurrent SBO. She presented on 8/24/2017 with generalized malaise, fevers, chills and nasal congestion. She was initially discharged from ER with augmentin for presumed sinusitis however called back to the hospital due to positive blood culture with E coli. Further work up did not identify a source of infection.     Ms. Thompson was initiated on Zosyn to cover E coli bacteremia and she did well. The strain ultimately was pan-sensitive and her labs were changed to Ceftriaxone. She had transaminitis, but no other findings, and so it is presumed that she had some type of biliary source of the E coli.     At the time of discharge, Ms. Thompson is alert and coherent in NAD.   /71  Temp 98.2  F (36.8  C) (Oral)  Resp 16  Ht 1.626 m (5' 4\")  Wt 68 kg (150 " lb)  LMP 12/19/2013  SpO2 99%  BMI 25.75 kg/m2   Chest: CTA  COR: RRR without murmur  Abd: soft, NTND without HSM.          Discharge Instructions and Follow-Up:   Discharge diet: Regular   Discharge activity: Activity as tolerated   Discharge follow-up: Follow up with Dr. Marrero as needed.           Discharge Disposition:   Discharged to home      Attestation:  I have reviewed today's vital signs, notes, medications, labs and imaging.  Total time: 25 minutes    Matheus Hickman MD

## 2017-08-28 NOTE — PROGRESS NOTES
[facility]  Infectious Disease Progress Note          Assessment and Plan:   IMPRESSION:   1.  Lynn Thompson, 52-year-old female with a history of recurrent pancreatitis.  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   2.  Distant history of Crohn's disease.  Status post ileal resection in the 1990s.  No further episodes of Crohn's disease since that time.   3.  Status post gastric bypass surgery.   4.  History of recurrent small-bowel obstruction.   5.  Admitted on this occasion with a 1-day history of fever, chills and malaise with some right upper quadrant abdominal pain.  Now found to have E. coli bacteremia.  A urinalysis is benign and urine culture unremarkable.  CT scan of the abdomen reveals no acute infectious process.  Liver enzymes elevated, which apparently has historically been true for the patient during times of illness.  The source of the bacteremia is unclear, perhaps hepatobiliary in origin.   6 Diarrhea c diff neg      RECOMMENDATIONS: 1 Doing well Ok home ceftin 10 days  2 Will call us if worsens or antibiotic issues'  3 Symptomatic diarrhea tx        Interval History:   no new complaints and doing Ok, no fever cxs same some loose stools C diff neg              Medications:       cefuroxime  500 mg Oral Q12H TERI     amylase-lipase-protease  4-5 capsule Oral TID w/meals     busPIRone  30 mg Oral BID     DULoxetine  30 mg Oral BID     gabapentin  100 mg Oral TID     levothyroxine  75 mcg Oral Daily     modafinil  400 mg Oral Daily     omeprazole  40 mg Oral BID     naloxegol  25 mg Oral QAM AC     insulin glargine  4 Units Subcutaneous QPM     buprenorphine HCl-naloxone HCl  0.5 Film Sublingual QAM     buprenorphine HCl-naloxone HCl  0.25 Film Sublingual QPM     sodium chloride (PF)  3 mL Intracatheter Q8H                  Physical Exam:   Blood pressure 138/71, temperature 98.2  F (36.8  C), temperature source Oral, resp. rate 16,  "height 1.626 m (5' 4\"), weight 68 kg (150 lb), last menstrual period 12/19/2013, SpO2 99 %, not currently breastfeeding.  Wt Readings from Last 2 Encounters:   08/25/17 68 kg (150 lb)   08/24/17 63.5 kg (140 lb)     Vital Signs with Ranges  Temp:  [97.5  F (36.4  C)-98.2  F (36.8  C)] 98.2  F (36.8  C)  Heart Rate:  [54-62] 62  Resp:  [16] 16  BP: (114-138)/(53-71) 138/71  SpO2:  [95 %-99 %] 99 %    Constitutional: Awake, alert, cooperative, no apparent distress   Lungs: Clear to auscultation bilaterally, no crackles or wheezing   Cardiovascular: Regular rate and rhythm, normal S1 and S2, and no murmur noted   Abdomen: Normal bowel sounds, soft, non-distended, non-tender   Skin: No rashes, no cyanosis, no edema   Other:           Data:   All microbiology laboratory data reviewed.  Recent Labs   Lab Test  08/27/17   0600  08/26/17   0559  08/24/17   2221   WBC  6.5  6.0  7.7   HGB  10.7*  11.5*  13.4   HCT  33.7*  35.3  41.2   MCV  94  93  93   PLT  269  273  327     Recent Labs   Lab Test  08/28/17   0633  08/27/17   0600  08/26/17   0559   CR  0.66  0.62  0.67     No lab results found.  Recent Labs   Lab Test  08/25/17   0730  08/25/17   0720  08/24/17   2221  08/24/17   2205  08/24/17   0825  08/24/17   0710  08/24/17   0615  05/30/17   0853  02/25/16   1746   CULT  No growth after 3 days  No growth after 3 days  No growth after 3 days  No growth after 3 days  No growth after 4 days  <10,000 colonies/mL  urogenital opal    Cultured on the 1st day of incubation:  Escherichia coli  *  Critical Value/Significant Value, preliminary result only, called to and read back by   DEREK LYNCH RN @1930 8/24/17. CT    (Note)  POSITIVE for E. COLI by Scopial Fashionigene multiplex nucleic acid test. Final  identification and antimicrobial susceptibility testing will be  verified by standard methods.    Specimen tested with Scopial Fashionigene multiplex, gram-negative blood culture  nucleic acid test for the following targets: Acinetobacter " sp.,  Citrobacter sp., Enterobacter sp., Proteus sp., E. coli, K.  pneumoniae/oxytoca, P. aeruginosa, and the following resistance  markers: CTXM, KPC, NDM, VIM, IMP and OXA.    Critical Value/Significant Value called to and read back by Norberto diane RN @ 2140 8/24/17 CS      >100,000 colonies/mL Escherichia coli*  50,000 to 100,000 colonies/mL Klebsiella pneumoniae*

## 2017-08-28 NOTE — PLAN OF CARE
Problem: Goal Outcome Summary  Goal: Goal Outcome Summary  VSS. C diff results came back negative, patient removed off enteric precautions and Dr. Alvarado notified. Per MD, pt will stay at least until morning to ensure that she does not get a fever on new antibiotic Ceftin and to ensure patient's electrolytes are WNL d/t a few episodes of loose stools today. Tolerating regular diet. Up ad xenia in room, transfers independently. CMS intact. Voiding in adequate amounts. Pain managed with scheduled Tylenol. Plans d/c to home.

## 2017-08-28 NOTE — PLAN OF CARE
Problem: Goal Outcome Summary  Goal: Goal Outcome Summary  Outcome: Improving  Denies pain. CMS intact. LS clear. Slept most of shift. IVF. Blood cultures pending. Negative for c.diff. VSS. Up indep. Will continue to monitor.

## 2017-08-29 ENCOUNTER — TELEPHONE (OUTPATIENT)
Dept: FAMILY MEDICINE | Facility: CLINIC | Age: 52
End: 2017-08-29

## 2017-08-29 NOTE — TELEPHONE ENCOUNTER
FVRER on 08/28/17 for E Coli Bacteremia, Positive Blood Culture.  No future appointment made.  Onelia Rivas

## 2017-08-29 NOTE — TELEPHONE ENCOUNTER
ED / Discharge Outreach Protocol    Patient Contact    Attempt # 1    Was call answered?  No.  Left message on voicemail with information to call me back.    Follow-up and recommended labs and tests          Follow up with Dr. Marrero as needed for suspected complications related to this infection or treatment.             Neel Zarate RN, BSN

## 2017-08-29 NOTE — TELEPHONE ENCOUNTER
"Hospital/TCU/ED for chronic condition Discharge Protocol    \"Hi, my name is Eugenia Martin, a registered nurse, and I am calling from [facility].  I am calling to follow up and see how things are going for you after your recent emergency visit/hospital/TCU stay.\"    Tell me how you are doing now that you are home?\" Doing better.  Probiotics are helping with the diarrhea.  Taking antibiotics as prescribed.      Discharge Instructions    \"Let's review your discharge instructions.  What is/are the follow-up recommendations?  Pt. Response:  f/u with PCP if no improvement or worsening.      \"Has an appointment with your primary care provider been scheduled?\"   Yes. (confirm)    \"When you see the provider, I would recommend that you bring your medications with you.\"    Medications    \"Tell me what changed about your medicines when you discharged?\"    Changes to chronic meds?    0-1    \"What questions do you have about your medications?\"    None     New diagnoses of heart failure, COPD, diabetes, or MI?    No                  Medication reconciliation completed? Yes  Was MTM referral placed (*Make sure to put transitions as reason for referral)?   No    Call Summary    \"What questions or concerns do you have about your recent visit and your follow-up care?\"     none    \"If you have questions or things don't continue to improve, we encourage you contact us through the main clinic number (give number).  Even if the clinic is not open, triage nurses are available 24/7 to help you.     We would like you to know that our clinic has extended hours (provide information).  We also have urgent care (provide details on closest location and hours/contact info)\"      \"Thank you for your time and take care!\"    Eugenia Martin RN       "

## 2017-08-30 LAB
BACTERIA SPEC CULT: NO GROWTH
SPECIMEN SOURCE: NORMAL

## 2017-08-31 LAB
BACTERIA SPEC CULT: NO GROWTH
Lab: NORMAL
SPECIMEN SOURCE: NORMAL

## 2017-09-01 ENCOUNTER — OFFICE VISIT (OUTPATIENT)
Dept: FAMILY MEDICINE | Facility: CLINIC | Age: 52
End: 2017-09-01
Payer: COMMERCIAL

## 2017-09-01 VITALS
SYSTOLIC BLOOD PRESSURE: 118 MMHG | DIASTOLIC BLOOD PRESSURE: 72 MMHG | BODY MASS INDEX: 25.06 KG/M2 | HEART RATE: 63 BPM | TEMPERATURE: 98.4 F | OXYGEN SATURATION: 97 % | WEIGHT: 146 LBS

## 2017-09-01 DIAGNOSIS — R79.89 ELEVATED LFTS: ICD-10-CM

## 2017-09-01 DIAGNOSIS — T36.95XA ANTIBIOTIC-ASSOCIATED DIARRHEA: ICD-10-CM

## 2017-09-01 DIAGNOSIS — B96.20 E COLI BACTEREMIA: ICD-10-CM

## 2017-09-01 DIAGNOSIS — Z98.84 GASTRIC BYPASS STATUS FOR OBESITY: ICD-10-CM

## 2017-09-01 DIAGNOSIS — Z09 HOSPITAL DISCHARGE FOLLOW-UP: Primary | ICD-10-CM

## 2017-09-01 DIAGNOSIS — K52.1 ANTIBIOTIC-ASSOCIATED DIARRHEA: ICD-10-CM

## 2017-09-01 DIAGNOSIS — R78.81 E COLI BACTEREMIA: ICD-10-CM

## 2017-09-01 DIAGNOSIS — G89.4 CHRONIC PAIN SYNDROME: ICD-10-CM

## 2017-09-01 DIAGNOSIS — K86.81 EXOCRINE PANCREATIC INSUFFICIENCY: ICD-10-CM

## 2017-09-01 PROCEDURE — 99495 TRANSJ CARE MGMT MOD F2F 14D: CPT | Performed by: FAMILY MEDICINE

## 2017-09-01 RX ORDER — L. ACIDOPHILUS/L.BULGARICUS 1MM CELL
1 TABLET ORAL 3 TIMES DAILY
Qty: 90 TABLET | Refills: 1 | Status: SHIPPED | OUTPATIENT
Start: 2017-09-01 | End: 2017-11-07

## 2017-09-01 NOTE — PROGRESS NOTES
SUBJECTIVE:   Lynn Thompson is a 52 year old female who presents to clinic today for the following health issues:    Hospital Follow-up Visit:    Hospital/Nursing Home/IP Rehab Facility: [facility]  Date of Admission: Aug 24  Date of Discharge: Aug 28  Reason(s) for Admission: E coli bacteremia            Problems taking medications regularly:  None       Medication changes since discharge: None       Problems adhering to non-medication therapy:  None    Summary of hospitalization:  [facility] discharge summary reviewed  Diagnostic Tests/Treatments reviewed.  Follow up needed: none  Other Healthcare Providers Involved in Patient s Care:         None  Update since discharge: improved.     Post Discharge Medication Reconciliation: discharge medications reconciled, continue medications without change.  Plan of care communicated with patient     Coding guidelines for this visit:  Type of Medical   Decision Making Face-to-Face Visit       within 7 Days of discharge Face-to-Face Visit        within 14 days of discharge   Moderate Complexity [id number]   High Complexity 59341 [id number]            Admitted for e coli bacteremia, treated with IV abx, now on PO, doing well.     Also had transaminitis, , , . Therefore, assumption made that her infection source was hepatobiliary in nature.       Problem list and histories reviewed & adjusted, as indicated.  Additional history: none    Patient Active Problem List   Diagnosis     Iron deficiency anemia secondary to inadequate dietary iron intake     Gastric bypass status for obesity     Health Care Home     Chronic pain syndrome     Pancreatic insufficiency     Exocrine pancreatic insufficiency     Asplenia     BLU (obstructive sleep apnea)     S/P exploratory laparotomy     Dysthymia     Anxiety     Thyroid nodule     Acquired hypothyroidism     Post-pancreatectomy diabetes (H)     E coli bacteremia     Antibiotic-associated diarrhea      Elevated LFTs     Past Surgical History:   Procedure Laterality Date     APPENDECTOMY       C NONSPECIFIC PROCEDURE      R bunion     C NONSPECIFIC PROCEDURE      T & A     C NONSPECIFIC PROCEDURE      partial ileum resection     C NONSPECIFIC PROCEDURE      R ovarian cyst     C NONSPECIFIC PROCEDURE           C NONSPECIFIC PROCEDURE      GALL BLADDER     C NONSPECIFIC PROCEDURE      CBD stent; Dr. Presley     C NONSPECIFIC PROCEDURE   &     colonoscopy     C NONSPECIFIC PROCEDURE      Gastric bypass     CHOLECYSTECTOMY       COLONOSCOPY       COMBINED CYSTOSCOPY, RETROGRADES, URETEROSCOPY, LASER HOLMIUM LITHOTRIPSY URETER(S), INSERT STENT Right 3/23/2015    Procedure: COMBINED CYSTOSCOPY, RETROGRADES, URETEROSCOPY, LASER HOLMIUM LITHOTRIPSY URETER(S), INSERT STENT;  Surgeon: Kennedi Aldana MD;  [facility]     COMBINED CYSTOSCOPY, RETROGRADES, URETEROSCOPY, LASER HOLMIUM LITHOTRIPSY URETER(S), INSERT STENT Right 2015    Procedure: COMBINED CYSTOSCOPY, RETROGRADES, URETEROSCOPY, LASER HOLMIUM LITHOTRIPSY URETER(S), INSERT STENT;  Surgeon: Kennedi Aldana MD;  Location: [facility] OR     COSMETIC SURGERY  2002    Tummy tuck     CYSTOSCOPY, RETROGRADES, INSERT STENT URETER(S), COMBINED  10/2/2012    Procedure: COMBINED CYSTOSCOPY, RETROGRADES, INSERT STENT URETER(S);  COMBINED CYSTOSCOPY,  , INSERT LEFT STENT URETER;  Surgeon: Johny Baez MD;  Location: [facility] OR     ENT SURGERY       EXTRACORPOREAL SHOCK WAVE LITHOTRIPSY (ESWL)  10/16/2012    Procedure: EXTRACORPOREAL SHOCK WAVE LITHOTRIPSY (ESWL);  left EXTRACORPOREAL SHOCK WAVE LITHOTRIPSY (ESWL) ;  Surgeon: Johny Baez MD;  Location: [facility] OR     GI SURGERY  2015    Small bowel obstruction     HC LAP,LYSIS OF ADHESIONS       HERNIA REPAIR  2015     LAPAROSCOPIC LYSIS ADHESIONS N/A 2015    Procedure: LAPAROSCOPIC LYSIS ADHESIONS;  Surgeon: Aaron Early MD;   [facility]     LAPAROSCOPIC LYSIS ADHESIONS N/A 2015    Procedure: LAPAROSCOPIC LYSIS ADHESIONS;  Surgeon: Aaron Early MD;  [facility]     PANCREATECTOMY, TRANSPLANT AUTO ISLET CELL, COMBINED  5/10/2013    Procedure: COMBINED PANCREATECTOMY, TRANSPLANT AUTO ISLET CELL;  Pancreatectomy, Auto Islet Cell Transplant   hernia repair, jejunostomy tube and liver biopsies with Anesthesia General with block;  Surgeon: Aaron Early MD;  [facility] OR     TRANSPLANT  5/10/13       Social History   Substance Use Topics     Smoking status: Never Smoker     Smokeless tobacco: Never Used     Alcohol use No     Family History   Problem Relation Age of Onset     Family History Negative Mother      Respiratory Father      COPD;  at 69     Genitourinary Problems Father      kidney stones     Substance Abuse Father      Depression Father      Asthma Father      HEART DISEASE Paternal Grandfather      M.I.     Coronary Artery Disease Paternal Grandfather      Hyperlipidemia Paternal Grandfather      Genitourinary Problems Brother      multiple brothers with kidney stones     GASTROINTESTINAL DISEASE Maternal Grandmother      undiagnosed 'gut' issues     Coronary Artery Disease Maternal Grandfather      Hyperlipidemia Maternal Grandfather      CEREBROVASCULAR DISEASE Paternal Grandmother      At the age of 103     Anxiety Disorder Paternal Grandmother      OSTEOPOROSIS Paternal Grandmother      Anxiety Disorder Son      Anxiety Disorder Daughter      Asthma Daughter              Reviewed and updated as needed this visit by clinical staff       Reviewed and updated as needed this visit by Provider         ROS:  Constitutional, HEENT, cardiovascular, pulmonary, gi and gu systems are negative, except as otherwise noted.      OBJECTIVE:   /72 (BP Location: Right arm, Patient Position: Sitting, Cuff Size: Adult Regular)  Pulse 63  Temp 98.4  F (36.9  C)  Wt 146 lb (66.2 kg)  LMP 2013  SpO2 97%   Breastfeeding? No  BMI 25.06 kg/m2  Body mass index is 25.06 kg/(m^2).  GENERAL: healthy, alert and no distress  RESP: lungs clear to auscultation - no rales, rhonchi or wheezes  CV: regular rate and rhythm, normal S1 S2, no S3 or S4, no murmur, click or rub, no peripheral edema and peripheral pulses strong  ABDOMEN: soft, nontender, no hepatosplenomegaly, no masses and bowel sounds normal    Diagnostic Test Results:  none     ASSESSMENT/PLAN:     1. Hospital discharge follow-up - improving, energy levels returning    2. E coli bacteremia - unclear source but thought to be hepatobiliary, Lynn was advised to set appt with GI transplant doc ASAP    3. Antibiotic-associated diarrhea - continue probiotic for another 2 months due to recent courses of abx  - lactobacillus TABS; Take 1 tablet by mouth 3 times daily  Dispense: 90 tablet; Refill: 1    4. Elevated LFTs - unclear cause, thought to be related cause of #2    6. Exocrine pancreatic insufficiency - discussed need for GI follow up as she's had islet cell transplant to the liver, now with elevated LFTs, possible infectious source    7. Chronic pain syndrome - managing well with suboxone    Maryana Brooks MD  [facility]

## 2017-09-01 NOTE — NURSING NOTE
"Chief Complaint   Patient presents with     Hospital F/U       Initial /72 (BP Location: Right arm, Patient Position: Sitting, Cuff Size: Adult Regular)  Pulse 63  Temp 98.4  F (36.9  C)  Wt 146 lb (66.2 kg)  LMP 12/19/2013  SpO2 97%  Breastfeeding? No  BMI 25.06 kg/m2 Estimated body mass index is 25.06 kg/(m^2) as calculated from the following:    Height as of 8/25/17: 5' 4\" (1.626 m).    Weight as of this encounter: 146 lb (66.2 kg).  Medication Reconciliation: complete     Syed COMBS      "

## 2017-09-01 NOTE — MR AVS SNAPSHOT
After Visit Summary   9/1/2017    Lynn Thompson    MRN: [id number]           Patient Information     Date Of Birth          1965        Visit Information        Provider Department      9/1/2017 1:30 PM Maryana Brooks MD [facility]        Today's Diagnoses     Hospital discharge follow-up    -  1    E coli bacteremia        Antibiotic-associated diarrhea        Elevated LFTs        Gastric bypass status for obesity        Exocrine pancreatic insufficiency        Chronic pain syndrome           Follow-ups after your visit        Your next 10 appointments already scheduled     Sep 07, 2017 10:00 AM CDT   (Arrive by 9:45 AM)   Return Auto Islet with Adriana Robles MD   [facility] Trans[facility] ([facility] [place])    [address]  [address] [facility] [address] [phone]            Oct 16, 2017  2:30 PM CDT   Office Visit with K[facility]itha Spencer DO   [facility]    [id number] [place] [id number] [phone]           Bring a current list of meds and any records pertaining to this visit. For Physicals, please bring immunization records and any forms needing to be filled out. Please arrive 10 minutes early to complete paperwork.              Who to contact     If you have questions or need follow up information about today's clinic visit or your schedule please contact [facility] directly at [phone].  Normal or non-critical lab and imaging results will be communicated to you by MyChart, letter or phone within 4 business days after the clinic has received the results. If you do not hear from us within 7 days, please contact the clinic through MyChart or phone. If you have a critical or abnormal lab result, we will notify you by phone as soon as possible.  Submit refill requests through [employer] or call your pharmacy and they will forward the refill  request to us. Please allow 3 business days for your refill to be completed.          Additional Information About Your Visit        [employer] Information     [employer] gives you secure access to your electronic health record. If you see a primary care provider, you can also send messages to your care team and make appointments. If you have questions, please call your primary care clinic.  If you do not have a primary care provider, please call [phone] and they will assist you.        Care EveryWhere ID     This is your Care EveryWhere ID. This could be used by other organizations to access your [facility] medical records  [id number]        Your Vitals Were     Pulse Temperature Last Period Pulse Oximetry Breastfeeding? BMI (Body Mass Index)    63 98.4  F (36.9  C) 12/19/2013 97% No 25.06 kg/m2       Blood Pressure from Last 3 Encounters:   09/01/17 118/72   08/28/17 138/71   08/24/17 125/71    Weight from Last 3 Encounters:   09/01/17 146 lb (66.2 kg)   08/25/17 150 lb (68 kg)   08/24/17 140 lb (63.5 kg)              Today, you had the following     No orders found for display         Today's Medication Changes          These changes are accurate as of: 9/1/17  4:50 PM.  If you have any questions, ask your nurse or doctor.               These medicines have changed or have updated prescriptions.        Dose/Directions    insulin glargine 100 UNIT/ML injection   Commonly known as:  LANTUS SOLOSTAR   This may have changed:  when to take this   Used for:  Post-pancreatectomy diabetes (H)        Dose:  4 Units   Inject 4 Units Subcutaneous every 24 hours   Quantity:  15 mL   Refills:  1            Where to get your medicines      Some of these will need a paper prescription and others can be bought over the counter.  Ask your nurse if you have questions.     Bring a paper prescription for each of these medications     lactobacillus Tabs                Primary Care Provider Office Phone # Fax #    Maryana Brooks  [phone]       [address]  [facility] [address]        Equal Access to Services     CHAD SOLANO : Nuha kapil ocasio delbert Tolentino, wajose de jesusda geovanna, joshua kavinhda johnny, albin joshua luisromana gale lamaximemikal alejandro. So [facility] [phone].    ATENCIÓN: Si habla español, tiene a rivera disposición servicios gratuitos de asistencia lingüística. Llame al [phone].    We comply with applicable federal civil rights laws and [place] laws. We do not discriminate on the basis of race, color, national origin, age, disability sex, sexual orientation or gender identity.            Thank you!     Thank you for choosing [facility]  for your care. Our goal is always to provide you with excellent care. Hearing back from our patients is one way we can continue to improve our services. Please take a few minutes to complete the written survey that you may receive in the mail after your visit with us. Thank you!             Your Updated Medication List - Protect others around you: Learn how to safely use, store and throw away your medicines at www.disposemymeds.org.          This list is accurate as of: 9/1/17  4:50 PM.  Always use your most recent med list.                   Brand Name Dispense Instructions for use Diagnosis    ACETAMINOPHEN PO      Take 325 mg by mouth every 6 hours as needed for pain        amylase-lipase-protease 65686 UNITS Tabs tablet    VIOKACE    600 tablet    4-5 aps with meals and 1-2 with snacks    Pancreatic insufficiency       blood glucose lancing device     1 each    Use to test blood sugars 6 times daily or as directed.    Absence of pancreas, acquired       blood glucose monitoring test strip    BILL CONTOUR NEXT    2 Box    Check BS 4-6 times a day    Absence of pancreas, acquired       * buprenorphine HCl-naloxone HCl 2-0.5 MG per film    SUBOXONE     Place 0.5 Film under the tongue every morning        * buprenorphine HCl-naloxone HCl 2-0.5 MG per film     SUBOXONE     Place 0.25 Film under the tongue every evening        busPIRone 15 MG tablet    BUSPAR    120 tablet    Increased dose. 30 mg two times per day.    Anxiety       cefuroxime 500 MG tablet    CEFTIN    22 tablet    Take 1 tablet (500 mg) by mouth every 12 hours for 11 days    E coli bacteremia       CLONIDINE HCL PO      Take 0.1 mg by mouth 2 times daily as needed        DOCUSATE SODIUM PO      Take 100 mg by mouth daily        DULoxetine 30 MG EC capsule    CYMBALTA    180 capsule    Take 1 capsule (30 mg) by mouth 2 times daily Fill at patient request.    Anxiety       estradiol 0.1 MG/GM cream    ESTRACE VAGINAL    42.5 g    Place 2 g vaginally twice a week    Vaginal atrophy       GABAPENTIN PO      Take 100 mg by mouth 3 times daily        glucose 40 % Gel gel     1 Tube    Take 15-30 g by mouth every 15 minutes as needed.    Chronic pancreatitis (H)       insulin glargine 100 UNIT/ML injection    LANTUS SOLOSTAR    15 mL    Inject 4 Units Subcutaneous every 24 hours    Post-pancreatectomy diabetes (H)       insulin pen needle 32G X 4 MM    BD AMHESH U/F    100 each    Use up to 3 times daily as needed for insulin dosing    Acquired absence of pancreas       lactobacillus Tabs     90 tablet    Take 1 tablet by mouth 3 times daily    Antibiotic-associated diarrhea       levothyroxine 75 MCG tablet    SYNTHROID/LEVOTHROID    90 tablet    Take 1 tablet (75 mcg) by mouth daily    Acquired hypothyroidism       loratadine 10 MG tablet    CLARITIN     Take 10 mg by mouth daily as needed        modafinil 200 MG tablet    PROVIGIL     Take 2 tablets by mouth Once a Day        MOVANTIK 25 MG Tabs tablet   Generic drug:  naloxegol      Take 25 mg by mouth every morning (before breakfast)        naloxone nasal spray    NARCAN     Spray 4 mg in nostril as needed for opioid reversal        NovoLOG penFILL 100 UNITS/ML injection   Generic drug:  insulin aspart     3 Month    Take as directed    Diabetes mellitus  type 1 (H)       NOVOPEN  (GREEN) Genny   Generic drug:  Injection Device for insulin      1 Device Inject 1 unit (which is marked as a 1/2 unit on the pen itself)  as needed when eating carb heavy meals.        omeprazole 40 MG capsule    priLOSEC    180 capsule    Take 1 capsule (40 mg) by mouth 2 times daily Take 30-60 minutes before a meal.    Gastroesophageal reflux disease without esophagitis, Nausea, Gastric bypass status for obesity       polyethylene glycol Packet    MIRALAX/GLYCOLAX    7 packet    Take 17 g by mouth daily    Constipation       sennosides 8.6 MG tablet    SENOKOT    120 tablet    Take 2 tablets by mouth 2 times daily    Other constipation       Sharps Container Misc     1 each    For insulin needles    Absence of pancreas, acquired       TRAZODONE HCL PO      Take 50 mg by mouth nightly as needed for sleep        * Notice:  This list has 2 medication(s) that are the same as other medications prescribed for you. Read the directions carefully, and ask your doctor or other care provider to review them with you.

## 2017-09-06 DIAGNOSIS — R68.82 DECREASED LIBIDO: ICD-10-CM

## 2017-09-07 ENCOUNTER — OFFICE VISIT (OUTPATIENT)
Dept: TRANSPLANT | Facility: CLINIC | Age: 52
End: 2017-09-07
Attending: PEDIATRICS
Payer: COMMERCIAL

## 2017-09-07 VITALS
BODY MASS INDEX: 24.81 KG/M2 | HEIGHT: 64 IN | WEIGHT: 145.3 LBS | HEART RATE: 83 BPM | RESPIRATION RATE: 16 BRPM | OXYGEN SATURATION: 98 % | TEMPERATURE: 98.2 F | SYSTOLIC BLOOD PRESSURE: 150 MMHG | DIASTOLIC BLOOD PRESSURE: 87 MMHG

## 2017-09-07 DIAGNOSIS — Z90.410 POST-PANCREATECTOMY DIABETES (H): Primary | ICD-10-CM

## 2017-09-07 DIAGNOSIS — K83.09 CHOLANGITIS (H): ICD-10-CM

## 2017-09-07 DIAGNOSIS — E89.1 POST-PANCREATECTOMY DIABETES (H): Primary | ICD-10-CM

## 2017-09-07 DIAGNOSIS — E13.9 POST-PANCREATECTOMY DIABETES (H): Primary | ICD-10-CM

## 2017-09-07 LAB
ALBUMIN SERPL-MCNC: 3.6 G/DL (ref 3.4–5)
ALP SERPL-CCNC: 196 U/L (ref 40–150)
ALT SERPL W P-5'-P-CCNC: 97 U/L (ref 0–50)
AST SERPL W P-5'-P-CCNC: 69 U/L (ref 0–45)
BILIRUB DIRECT SERPL-MCNC: 0.1 MG/DL (ref 0–0.2)
BILIRUB SERPL-MCNC: 0.4 MG/DL (ref 0.2–1.3)
HBA1C MFR BLD: 6.5 % (ref 0–5.7)
PROT SERPL-MCNC: 7.8 G/DL (ref 6.8–8.8)

## 2017-09-07 PROCEDURE — 99211 OFF/OP EST MAY X REQ PHY/QHP: CPT

## 2017-09-07 PROCEDURE — 83036 HEMOGLOBIN GLYCOSYLATED A1C: CPT | Mod: ZF | Performed by: PEDIATRICS

## 2017-09-07 PROCEDURE — 36415 COLL VENOUS BLD VENIPUNCTURE: CPT | Performed by: PEDIATRICS

## 2017-09-07 PROCEDURE — 80076 HEPATIC FUNCTION PANEL: CPT | Performed by: PEDIATRICS

## 2017-09-07 RX ORDER — ESTRADIOL AND NORETHINDRONE ACETATE .5; .1 MG/1; MG/1
TABLET ORAL
Qty: 84 TABLET | Refills: 3 | Status: SHIPPED | OUTPATIENT
Start: 2017-09-07 | End: 2018-08-03

## 2017-09-07 NOTE — LETTER
9/7/2017       RE: Lynn Thompson  [id number] [facility] [id number]     Dear Colleague,    Thank you for referring your patient, Lynn Thompson, to the [facility] SOLID ORGAN TRANSPLANT at [facility]. Please see a copy of my visit note below.    [facility]  Islet Autotransplant, Diabetes Follow Up    Problem List:  Patient Active Problem List   Diagnosis     Iron deficiency anemia secondary to inadequate dietary iron intake     Gastric bypass status for obesity     Health Care Home     Chronic pain syndrome     Exocrine pancreatic insufficiency     Asplenia     BLU (obstructive sleep apnea)     S/P exploratory laparotomy     Dysthymia     Anxiety     Thyroid nodule     Acquired hypothyroidism     Post-pancreatectomy diabetes (H)     E coli bacteremia     Antibiotic-associated diarrhea     Elevated LFTs       HPI:  Lynn is a 52 year old female here for follow up o total pancreatectomy, islet cell autotransplant, splenectomy, liver biopsy (needle core), choledochoduodenostomy, feeding jejunostomy performed on 5/10/2013.  At the time of the procedure, the patient received 178,100 IEQ, or 3,209 IEQ/kg body weight. She has had two subsequent exploratory laparatomy for small bowel obstruction. Other notable endocrine history includes a complex cystic nodule in mid right thyroid lobe with 1 cm maximum diameter (saw Dr Adorno in 11/2016) which needs annual follow up U/S in Nov 2017, and mild hypothyroidism followed by PCP.  She had an episode of cholangitis and was hospitalized for 4 days 8/24/17 (fevers, RUQ pain, + Ecoli blood cx).    She was on NO insulin at her 1 year, 2 year, 3 year, 4 year transplant anniversaries and restart insulin just after 4 years post-tx, insulin restart date of  6/6/2017.  She is continuing to wean narcotics, on a suboxone wean, now on a 1/2 patch which I believe is a dose of 1 mg of bupronephrone (1/2 of  the Suboxone 1- 0.5 mg film) daily, with no other narcotics.     At today's visit, Lynn continues to have partial islet graft function and is doing much better with regards to glycemic control.  Her A1c is 6.5%.  She had an episode of E coli sepsis + elevated LFTs, upper abdominal pain and fevers all c/w cholangitis, her first such episode, for which she was admitted to outside [facility] in late August.  She did notice that her blood glucose ran a little higher than usual just before that infection was diagnosed.  She currently does have some mild hypoglycemia, 60s-70s, a few mornings in the past 2 weeks. However, when I discuss this with her she says she ate a high sugar bedtime snack and when she does this it precipitates the reactive lows.  She felt the Lantus worked very well for her if she does not have a high carb bedtime snack.  She feels all her lows and has not had severe hypoglycemia.      Diabetes history:  Current insulin regimen:  Lantus 4 units per day  Novolog 0.5 units if eating >45g, and correction scale of 0.5 u per 100 >150-- mostly just needs Novolog at dinner time  TDD: 4.5 unit per day, total insulin on average.      Recent hemoglobin A1c levels:  Lab Results   Component Value Date    A1C 6.5 2017    A1C 7.0 2017    A1C 7.2 2016    A1C 7.2 2016    A1C 6.7 2016         Hypoglycemia history:  A few 60s, none ,lower.  The patient has had NO episodes of severe hypoglycemia (seizure, coma, or neuroglycopenic symptoms severe enough to require assistance from another person).  Blood sugars were reviewed from the patient records and/or the meter download.    Average B mg/dL SD36 mg/dL  Number of blood sugar checks per day 2.8 (more recently)    Current pain hx:  Outside of cholangitis event, she actually reports rare abdominal pain, some cramps in lower abdomen, and no pancreatitis type pain.  She feels better than she has since surgery, and considers  her pain burden as much better now.  She is still on her suboxone wean which she says has worked wonderfully for her and has a plan to get off narcotics entirely.  She is using only 1/2 patch now of the Suboxone (= 1 mg buprenorphine - 0.25 mg naloxone).        Review of systems:  Review Of Systems  Skin: negative  Eyes: negative  Ears/Nose/Throat: negative  Respiratory: No shortness of breath, dyspnea on exertion, cough, or hemoptysis  Cardiovascular: negative  Gastrointestinal: as above  Genitourinary: negative  Musculoskeletal: negative  Neurologic: negative  Psychiatric: negative  Hematologic/Lymphatic/Immunologic: negative  Endocrine: as above    Past Medical and Surgical History:  Past Medical History:   Diagnosis Date     Abdominal pain, epigastric 11/05, ongoing; goes to pain clinic    probable recurrent spasm sphincter of Oddi causing biliary colic pains      Benign paroxysmal positional vertigo     occ.      Calculus of kidney 5/05    x1 on L side passed, several stones.  Has been tested for oxalate.     Chronic abdominal pain 7/17/2013     Chronic pancreatitis (H) 7/17/2013     Depressive disorder, not elsewhere classified     also occ panic spells     Diabetes (H)     post pancreatectomy     Dyspepsia and other specified disorders of function of stomach 6/99    H. 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    ERCP with Stent of CBD by Fernandez [place] [facility] [place] with sx relief     Spasm of sphincter of Oddi     surgical + endoscopic stenting of pancreatic duct @ [facility] [place] 5/23/06      Thyroid nodule 2016     Ureteral calculus 10/2/2012     Vaccination not carried out      Past Surgical History:   Procedure Laterality Date     APPENDECTOMY       C NONSPECIFIC PROCEDURE      R bunion     C NONSPECIFIC PROCEDURE      T & A     C NONSPECIFIC PROCEDURE      partial ileum resection     C NONSPECIFIC PROCEDURE      R ovarian cyst     C NONSPECIFIC PROCEDURE           C NONSPECIFIC PROCEDURE      GALL BLADDER     C NONSPECIFIC PROCEDURE      CBD stent; Dr. Fernandez MAY NONSPECIFIC PROCEDURE   &     colonoscopy     C NONSPECIFIC PROCEDURE      Gastric bypass     CHOLECYSTECTOMY       COLONOSCOPY       COMBINED CYSTOSCOPY, RETROGRADES, URETEROSCOPY, LASER HOLMIUM LITHOTRIPSY URETER(S), INSERT STENT Right 3/23/2015    Procedure: COMBINED CYSTOSCOPY, RETROGRADES, URETEROSCOPY, LASER HOLMIUM LITHOTRIPSY URETER(S), INSERT STENT;  Surgeon: Kennedi Aldana MD;  [facility]     COMBINED CYSTOSCOPY, RETROGRADES, URETEROSCOPY, LASER HOLMIUM LITHOTRIPSY URETER(S), INSERT STENT Right 2015    Procedure: COMBINED CYSTOSCOPY, RETROGRADES, URETEROSCOPY, LASER HOLMIUM LITHOTRIPSY URETER(S), INSERT STENT;  Surgeon: Kennedi Aldana MD;  [facility]     [facility]     CYSTOSCOPY, RETROGRADES, INSERT STENT URETER(S), COMBINED  10/2/2012    Procedure: COMBINED CYSTOSCOPY, RETROGRADES, INSERT STENT URETER(S);  COMBINED CYSTOSCOPY,  , INSERT LEFT STENT URETER;  Surgeon: Johny Baez MD;  Location: [facility] OR     ENT SURGERY       EXTRACORPOREAL SHOCK WAVE LITHOTRIPSY (ESWL)  10/16/2012    Procedure: EXTRACORPOREAL SHOCK WAVE LITHOTRIPSY (ESWL);  left EXTRACORPOREAL SHOCK WAVE LITHOTRIPSY (ESWL) ;  Surgeon: Johny Baez MD;  Location: [facility] OR     GI SURGERY  2015    Small bowel obstruction     HC LAP,LYSIS OF ADHESIONS       HERNIA REPAIR  2015     LAPAROSCOPIC LYSIS ADHESIONS N/A 2015     "Procedure: LAPAROSCOPIC LYSIS ADHESIONS;  Surgeon: Aaron Early MD;  [facility]     LAPAROSCOPIC LYSIS ADHESIONS N/A 2015    Procedure: LAPAROSCOPIC LYSIS ADHESIONS;  Surgeon: Aaron Early MD;  [facility]     PANCREATECTOMY, TRANSPLANT AUTO ISLET CELL, COMBINED  5/10/2013    Procedure: COMBINED PANCREATECTOMY, TRANSPLANT AUTO ISLET CELL;  Pancreatectomy, Auto Islet Cell Transplant   hernia repair, jejunostomy tube and liver biopsies with Anesthesia General with block;  Surgeon: Aaron Early MD;  [facility]     TRANSPLANT  5/10/13       Family History:  New changes since last visit:  none  Family History   Problem Relation Age of Onset     Family History Negative Mother      Respiratory Father      COPD;  at 69     Genitourinary Problems Father      kidney stones     Substance Abuse Father      Depression Father      Asthma Father      HEART DISEASE Paternal Grandfather      M.I.     Coronary Artery Disease Paternal Grandfather      Hyperlipidemia Paternal Grandfather      Genitourinary Problems Brother      multiple brothers with kidney stones     GASTROINTESTINAL DISEASE Maternal Grandmother      undiagnosed 'gut' issues     Coronary Artery Disease Maternal Grandfather      Hyperlipidemia Maternal Grandfather      CEREBROVASCULAR DISEASE Paternal Grandmother      At the age of 103     Anxiety Disorder Paternal Grandmother      OSTEOPOROSIS Paternal Grandmother      Anxiety Disorder Son      Anxiety Disorder Daughter      Asthma Daughter        Social History:  Social History     Social History Narrative     She was laid off from her job recently and is still searching for a new job.      Physical Exam:  Vitals: /87  Pulse 83  Temp 98.2  F (36.8  C) (Oral)  Resp 16  Ht 1.626 m (5' 4\")  Wt 65.9 kg (145 lb 4.8 oz)  LMP 2013  SpO2 98%  BMI 24.94 kg/m2  BMI= Body mass index is 24.94 kg/(m^2).  General:  Well-appearing, NAD  Psych:  Communicative, with normal affect "         Assessment:  1.  Post pancreatectomy diabetes mellitus, s/p total pancreatectomy and islet autotransplant.    Lynn is a 52 year old with history of chronic pancreatitis who is s/p total pancreatectomy and islet autotransplant.  She was insulin independent until 6/6/2017 (just after 4 years post-tx) and now has partial islet function, basically on a basal only regimen execept that she does use a 0.5 unit dose for a large dinnner, and rarely needs her correction.    Recent cholangitis episode-- will review with team if any more work up needed.  Will recheck LFTs today as ALT was still elevated above baseline last check.      Plan:  1.  Changes to current diabetes regimen:  Patient Instructions       1)  Continue the Lantus 4 units daily.    2)  Let's keep the same Novolog dosing:           What your blood glucose is before eating:   How much you plan to eat: 80- 150 mg/dL 151- 250 mg/dL 251 to 350 mg/dL   Less than 45 grams carb 0 0.5 1   More than 45 grams carb 0.5 1 1.5      3)  The episode you had is very likely cholangitis.  We can see this after TPIAT either because of strictures in the bile duct, or just reflux of the gut contents into the bile duct because there is no sphincter.   So I will talk to the team about whether we need a MRCP or not -- there was no stricture evident or dilatation evident on the CT or the U/S.  Just be careful about being seen rapidly for any fevers or RUQ/epigastric pain.  You want to keep good bowel motility and continue the probiotics (to reduce risk for bacterial overgrowth).      4)  Check LFT's today.    December 7 at 9:20am.      2.  Frequency of blood sugar checks:  premeal and bedtime, and as needed for hypoglycemia (6 strip per day).    3.  Continue routine follow up for autoislet transplant patients:  Mixed meal test (6 mL/kg BoostHP to max of 360 mL) at 3 months, 6 months, and once a year post transplant.  Hemoglobin A1c levels at these time points and  quarterly.  4.  Other issues addressed today:  As above    Follow up:  3 mos    Contact me for questions at [phone] or [phone].  Emergency number to reach pediatric endocrinology after hours is [phone].        Adriana Robles MD  , Pediatric Endocrinology and Diabetes  [facility] [place]  [facility]    I spent 25 minutes face to face with Lynn, with more than 50% of that time counseling on plan of care.    Again, thank you for allowing me to participate in the care of your patient.      Sincerely,    Adriana Robles MD

## 2017-09-07 NOTE — PROGRESS NOTES
[facility]  Islet Autotransplant, Diabetes Follow Up    Problem List:  Patient Active Problem List   Diagnosis     Iron deficiency anemia secondary to inadequate dietary iron intake     Gastric bypass status for obesity     Health Care Home     Chronic pain syndrome     Exocrine pancreatic insufficiency     Asplenia     BLU (obstructive sleep apnea)     S/P exploratory laparotomy     Dysthymia     Anxiety     Thyroid nodule     Acquired hypothyroidism     Post-pancreatectomy diabetes (H)     E coli bacteremia     Antibiotic-associated diarrhea     Elevated LFTs       HPI:  Lynn is a 52 year old female here for follow up o total pancreatectomy, islet cell autotransplant, splenectomy, liver biopsy (needle core), choledochoduodenostomy, feeding jejunostomy performed on 5/10/2013.  At the time of the procedure, the patient received 178,100 IEQ, or 3,209 IEQ/kg body weight. She has had two subsequent exploratory laparatomy for small bowel obstruction. Other notable endocrine history includes a complex cystic nodule in mid right thyroid lobe with 1 cm maximum diameter (saw Dr Adorno in 11/2016) which needs annual follow up U/S in Nov 2017, and mild hypothyroidism followed by PCP.  She had an episode of cholangitis and was hospitalized for 4 days 8/24/17 (fevers, RUQ pain, + Ecoli blood cx).    She was on NO insulin at her 1 year, 2 year, 3 year, 4 year transplant anniversaries and restart insulin just after 4 years post-tx, insulin restart date of  6/6/2017.  She is continuing to wean narcotics, on a suboxone wean, now on a 1/2 patch which I believe is a dose of 1 mg of bupronephrone (1/2 of the Suboxone 1- 0.5 mg film) daily, with no other narcotics.     At today's visit, Lynn continues to have partial islet graft function and is doing much better with regards to glycemic control.  Her A1c is 6.5%.  She had an episode of E coli sepsis + elevated LFTs, upper abdominal pain and  fevers all c/w cholangitis, her first such episode, for which she was admitted to outside [facility] in late August.  She did notice that her blood glucose ran a little higher than usual just before that infection was diagnosed.  She currently does have some mild hypoglycemia, 60s-70s, a few mornings in the past 2 weeks. However, when I discuss this with her she says she ate a high sugar bedtime snack and when she does this it precipitates the reactive lows.  She felt the Lantus worked very well for her if she does not have a high carb bedtime snack.  She feels all her lows and has not had severe hypoglycemia.      Diabetes history:  Current insulin regimen:  Lantus 4 units per day  Novolog 0.5 units if eating >45g, and correction scale of 0.5 u per 100 >150-- mostly just needs Novolog at dinner time  TDD: 4.5 unit per day, total insulin on average.      Recent hemoglobin A1c levels:  Lab Results   Component Value Date    A1C 6.5 2017    A1C 7.0 2017    A1C 7.2 2016    A1C 7.2 2016    A1C 6.7 2016         Hypoglycemia history:  A few 60s, none ,lower.  The patient has had NO episodes of severe hypoglycemia (seizure, coma, or neuroglycopenic symptoms severe enough to require assistance from another person).  Blood sugars were reviewed from the patient records and/or the meter download.    Average B mg/dL SD36 mg/dL  Number of blood sugar checks per day 2.8 (more recently)    Current pain hx:  Outside of cholangitis event, she actually reports rare abdominal pain, some cramps in lower abdomen, and no pancreatitis type pain.  She feels better than she has since surgery, and considers her pain burden as much better now.  She is still on her suboxone wean which she says has worked wonderfully for her and has a plan to get off narcotics entirely.  She is using only 1/2 patch now of the Suboxone (= 1 mg buprenorphine - 0.25 mg naloxone).        Review of systems:  Review Of  Systems  Skin: negative  Eyes: negative  Ears/Nose/Throat: negative  Respiratory: No shortness of breath, dyspnea on exertion, cough, or hemoptysis  Cardiovascular: negative  Gastrointestinal: as above  Genitourinary: negative  Musculoskeletal: negative  Neurologic: negative  Psychiatric: negative  Hematologic/Lymphatic/Immunologic: negative  Endocrine: as above    Past Medical and Surgical History:  Past Medical History:   Diagnosis Date     Abdominal pain, epigastric 11/05, ongoing; goes to pain clinic    probable recurrent spasm sphincter of Oddi causing biliary colic pains      Benign paroxysmal positional vertigo     occ.      Calculus of kidney 5/05    x1 on L side passed, several stones.  Has been tested for oxalate.     Chronic abdominal pain 7/17/2013     Chronic pancreatitis (H) 7/17/2013     Depressive disorder, not elsewhere classified     also occ panic spells     Diabetes (H)     post pancreatectomy     Dyspepsia and other specified disorders of function of stomach 6/99    H. 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    [place] [facility] [place]     Spasm of sphincter of Oddi     [place] [facility] [place]     Thyroid nodule 11/1/2016     Ureteral calculus 10/2/2012     Vaccination not carried out      Past Surgical History:   Procedure Laterality Date     APPENDECTOMY  1990     C NONSPECIFIC PROCEDURE  12/98    R bunion     C NONSPECIFIC PROCEDURE  1997    T & A     C NONSPECIFIC PROCEDURE  1992     partial ileum resection     C NONSPECIFIC PROCEDURE      R ovarian cyst     C NONSPECIFIC PROCEDURE           C NONSPECIFIC PROCEDURE      GALL BLADDER     C NONSPECIFIC PROCEDURE      CBD stent; Dr. Fernandez MAY NONSPECIFIC PROCEDURE   &     colonoscopy     C NONSPECIFIC PROCEDURE      Gastric bypass     CHOLECYSTECTOMY       COLONOSCOPY       COMBINED CYSTOSCOPY, RETROGRADES, URETEROSCOPY, LASER HOLMIUM LITHOTRIPSY URETER(S), INSERT STENT Right 3/23/2015    Procedure: COMBINED CYSTOSCOPY, RETROGRADES, URETEROSCOPY, LASER HOLMIUM LITHOTRIPSY URETER(S), INSERT STENT;  Surgeon: Kennedi Aldana MD;  [facility]     COMBINED CYSTOSCOPY, RETROGRADES, URETEROSCOPY, LASER HOLMIUM LITHOTRIPSY URETER(S), INSERT STENT Right 2015    Procedure: COMBINED CYSTOSCOPY, RETROGRADES, URETEROSCOPY, LASER HOLMIUM LITHOTRIPSY URETER(S), INSERT STENT;  Surgeon: Kennedi Aldana MD;  [facility]     COSMETIC SURGERY  2002    Tummy tuck     CYSTOSCOPY, RETROGRADES, INSERT STENT URETER(S), COMBINED  10/2/2012    Procedure: COMBINED CYSTOSCOPY, RETROGRADES, INSERT STENT URETER(S);  COMBINED CYSTOSCOPY,  , INSERT LEFT STENT URETER;  Surgeon: Johny Baez MD;  Location: [facility] OR     ENT SURGERY       EXTRACORPOREAL SHOCK WAVE LITHOTRIPSY (ESWL)  10/16/2012    Procedure: EXTRACORPOREAL SHOCK WAVE LITHOTRIPSY (ESWL);  left EXTRACORPOREAL SHOCK WAVE LITHOTRIPSY (ESWL) ;  Surgeon: Johny Baez MD;  [facility]: [facility] OR     GI SURGERY  2015    Small bowel obstruction     HC LAP,LYSIS OF ADHESIONS       HERNIA REPAIR  2015     LAPAROSCOPIC LYSIS ADHESIONS N/A 2015    Procedure: LAPAROSCOPIC LYSIS ADHESIONS;  Surgeon: Aaron Early MD;  [facility]     LAPAROSCOPIC LYSIS ADHESIONS N/A 2015    Procedure: LAPAROSCOPIC LYSIS ADHESIONS;  Surgeon: Aaron Early MD;  [facility]     PANCREATECTOMY, TRANSPLANT AUTO ISLET CELL, COMBINED  5/10/2013     "Procedure: COMBINED PANCREATECTOMY, TRANSPLANT AUTO ISLET CELL;  Pancreatectomy, Auto Islet Cell Transplant   hernia repair, jejunostomy tube and liver biopsies with Anesthesia General with block;  Surgeon: Aaron Early MD;  [facility]     TRANSPLANT  5/10/13       Family History:  New changes since last visit:  none  Family History   Problem Relation Age of Onset     Family History Negative Mother      Respiratory Father      COPD;  at 69     Genitourinary Problems Father      kidney stones     Substance Abuse Father      Depression Father      Asthma Father      HEART DISEASE Paternal Grandfather      M.I.     Coronary Artery Disease Paternal Grandfather      Hyperlipidemia Paternal Grandfather      Genitourinary Problems Brother      multiple brothers with kidney stones     GASTROINTESTINAL DISEASE Maternal Grandmother      undiagnosed 'gut' issues     Coronary Artery Disease Maternal Grandfather      Hyperlipidemia Maternal Grandfather      CEREBROVASCULAR DISEASE Paternal Grandmother      At the age of 103     Anxiety Disorder Paternal Grandmother      OSTEOPOROSIS Paternal Grandmother      Anxiety Disorder Son      Anxiety Disorder Daughter      Asthma Daughter        Social History:  Social History     Social History Narrative     She was laid off from her job recently and is still searching for a new job.      Physical Exam:  Vitals: /87  Pulse 83  Temp 98.2  F (36.8  C) (Oral)  Resp 16  Ht 1.626 m (5' 4\")  Wt 65.9 kg (145 lb 4.8 oz)  LMP 2013  SpO2 98%  BMI 24.94 kg/m2  BMI= Body mass index is 24.94 kg/(m^2).  General:  Well-appearing, NAD  Psych:  Communicative, with normal affect         Assessment:  1.  Post pancreatectomy diabetes mellitus, s/p total pancreatectomy and islet autotransplant.    Lynn is a 52 year old with history of chronic pancreatitis who is s/p total pancreatectomy and islet autotransplant.  She was insulin independent until 2017 (just after 4 " years post-tx) and now has partial islet function, basically on a basal only regimen execept that she does use a 0.5 unit dose for a large dinnner, and rarely needs her correction.    Recent cholangitis episode-- will review with team if any more work up needed.  Will recheck LFTs today as ALT was still elevated above baseline last check.      Plan:  1.  Changes to current diabetes regimen:  Patient Instructions       1)  Continue the Lantus 4 units daily.    2)  Let's keep the same Novolog dosing:           What your blood glucose is before eating:   How much you plan to eat: 80- 150 mg/dL 151- 250 mg/dL 251 to 350 mg/dL   Less than 45 grams carb 0 0.5 1   More than 45 grams carb 0.5 1 1.5      3)  The episode you had is very likely cholangitis.  We can see this after TPIAT either because of strictures in the bile duct, or just reflux of the gut contents into the bile duct because there is no sphincter.   So I will talk to the team about whether we need a MRCP or not -- there was no stricture evident or dilatation evident on the CT or the U/S.  Just be careful about being seen rapidly for any fevers or RUQ/epigastric pain.  You want to keep good bowel motility and continue the probiotics (to reduce risk for bacterial overgrowth).      4)  Check LFT's today.    December 7 at 9:20am.      2.  Frequency of blood sugar checks:  premeal and bedtime, and as needed for hypoglycemia (6 strip per day).    3.  Continue routine follow up for autoislet transplant patients:  Mixed meal test (6 mL/kg BoostHP to max of 360 mL) at 3 months, 6 months, and once a year post transplant.  Hemoglobin A1c levels at these time points and quarterly.  4.  Other issues addressed today:  As above    Follow up:  3 mos    Contact me for questions at [phone] or [phone].  Emergency number to reach pediatric endocrinology after hours is [phone].        Adriana Robles MD  , Pediatric Endocrinology and  Diabetes  [facility] [place]  [facility]    I spent 25 minutes face to face with Lynn, with more than 50% of that time counseling on plan of care.

## 2017-09-07 NOTE — PATIENT INSTRUCTIONS
1)  Continue the Lantus 4 units daily.    2)  Let's keep the same Novolog dosing:           What your blood glucose is before eating:   How much you plan to eat: 80- 150 mg/dL 151- 250 mg/dL 251 to 350 mg/dL   Less than 45 grams carb 0 0.5 1   More than 45 grams carb 0.5 1 1.5      3)  The episode you had is very likely cholangitis.  We can see this after TPIAT either because of strictures in the bile duct, or just reflux of the gut contents into the bile duct because there is no sphincter.   So I will talk to the team about whether we need a MRCP or not -- there was no stricture evident or dilatation evident on the CT or the U/S.  Just be careful about being seen rapidly for any fevers or RUQ/epigastric pain.  You want to keep good bowel motility and continue the probiotics (to reduce risk for bacterial overgrowth).      4)  Check LFT's today.    December 7 at 9:20am.

## 2017-09-07 NOTE — MR AVS SNAPSHOT
After Visit Summary   9/7/2017    Lynn Thompson    MRN: [id number]           Patient Information     Date Of Birth          1965        Visit Information        Provider Department      9/7/2017 10:00 AM Adriana Robles MD [facility]        Today's Diagnoses     Post-pancreatectomy diabetes (H)    -  1    Cholangitis          Care Instructions      1)  Continue the Lantus 4 units daily.    2)  Let's keep the same Novolog dosing:           What your blood glucose is before eating:   How much you plan to eat: 80- 150 mg/dL 151- 250 mg/dL 251 to 350 mg/dL   Less than 45 grams carb 0 0.5 1   More than 45 grams carb 0.5 1 1.5      3)  The episode you had is very likely cholangitis.  We can see this after TPIAT either because of strictures in the bile duct, or just reflux of the gut contents into the bile duct because there is no sphincter.   So I will talk to the team about whether we need a MRCP or not -- there was no stricture evident or dilatation evident on the CT or the U/S.  Just be careful about being seen rapidly for any fevers or RUQ/epigastric pain.  You want to keep good bowel motility and continue the probiotics (to reduce risk for bacterial overgrowth).      4)  Check LFT's today.    December 7 at 9:20am.          Follow-ups after your visit        Follow-up notes from your care team     Return in about 3 months (around 12/7/2017).      Your next 10 appointments already scheduled     Oct 16, 2017  2:30 PM CDT   Office Visit with Kavitha Spencer,    [facility] ([facility]    [address] [facility] [address] [phone]           Bring a current list of meds and any records pertaining to this visit. For Physicals, please bring immunization records and any forms needing to be filled out. Please arrive 10 minutes early to complete paperwork.              Who to contact     If you have questions or need follow up  "information about today's clinic visit or your schedule please contact [facility] directly at [phone].  Normal or non-critical lab and imaging results will be communicated to you by [employer], letter or phone within 4 business days after the clinic has received the results. If you do not hear from us within 7 days, please contact the clinic through [email or web address] or phone. If you have a critical or abnormal lab result, we will notify you by phone as soon as possible.  Submit refill requests through DPSI or call your pharmacy and they will forward the refill request to us. Please allow 3 business days for your refill to be completed.          Additional Information About Your Visit        [employer] Information     [employer] gives you secure access to your electronic health record. If you see a primary care provider, you can also send messages to your care team and make appointments. If you have questions, please call your primary care clinic.  If you do not have a primary care provider, please call [phone] and they will assist you.        Care EveryWhere ID     This is your Care EveryWhere ID. This could be used by other organizations to access your [place] medical records  [id number]        Your Vitals Were     Pulse Temperature Respirations Height Last Period Pulse Oximetry    83 98.2  F (36.8  C) (Oral) 16 1.626 m (5' 4\") 12/19/2013 98%    BMI (Body Mass Index)                   24.94 kg/m2            Blood Pressure from Last 3 Encounters:   09/07/17 150/87   09/01/17 118/72   08/28/17 138/71    Weight from Last 3 Encounters:   09/07/17 65.9 kg (145 lb 4.8 oz)   09/01/17 66.2 kg (146 lb)   08/25/17 68 kg (150 lb)              We Performed the Following     Hemoglobin A1c POCT     Hepatic panel          Today's Medication Changes          These changes are accurate as of: 9/7/17 10:38 AM.  If you have any questions, ask your nurse or doctor.               These medicines have changed or have " updated prescriptions.        Dose/Directions    insulin glargine 100 UNIT/ML injection   Commonly known as:  LANTUS SOLOSTAR   This may have changed:  when to take this   Used for:  Post-pancreatectomy diabetes (H)        Dose:  4 Units   Inject 4 Units Subcutaneous every 24 hours   Quantity:  15 mL   Refills:  1                Primary Care Provider Office Phone # Fax #    Maryana Sivakumar Brooks [phone] [id number]  [facility] [address]        Equal Access to Services     CHAD SOLANO : Hadii aad ku hadasho Soomaali, waaxda luqadaha, qaybta kaalmada adeegyada, waxay idiin hayaan adeeg kharash lamely . So [facility] [phone].    ATENCIÓN: Si habla español, tiene a rivera disposición servicios gratuitos de asistencia lingüística. [facility] [phone].    We comply with applicable federal civil rights laws and [place] laws. We do not discriminate on the basis of race, color, national origin, age, disability sex, sexual orientation or gender identity.            Thank you!     Thank you for choosing [facility]  for your care. Our goal is always to provide you with excellent care. Hearing back from our patients is one way we can continue to improve our services. Please take a few minutes to complete the written survey that you may receive in the mail after your visit with us. Thank you!             Your Updated Medication List - Protect others around you: Learn how to safely use, store and throw away your medicines at www.disposemymeds.org.          This list is accurate as of: 9/7/17 10:38 AM.  Always use your most recent med list.                   Brand Name Dispense Instructions for use Diagnosis    ACETAMINOPHEN PO      Take 325 mg by mouth every 6 hours as needed for pain        amylase-lipase-protease 84045 UNITS Tabs tablet    VIOKACE    600 tablet    4-5 aps with meals and 1-2 with snacks    Pancreatic insufficiency       blood glucose lancing device     1 each     Use to test blood sugars 6 times daily or as directed.    Absence of pancreas, acquired       blood glucose monitoring test strip    BILL CONTOUR NEXT    2 Box    Check BS 4-6 times a day    Absence of pancreas, acquired       * buprenorphine HCl-naloxone HCl 2-0.5 MG per film    SUBOXONE     Place 0.5 Film under the tongue every morning        * buprenorphine HCl-naloxone HCl 2-0.5 MG per film    SUBOXONE     Place 0.25 Film under the tongue every evening        busPIRone 15 MG tablet    BUSPAR    120 tablet    Increased dose. 30 mg two times per day.    Anxiety       cefuroxime 500 MG tablet    CEFTIN    22 tablet    Take 1 tablet (500 mg) by mouth every 12 hours for 11 days    E coli bacteremia       CLONIDINE HCL PO      Take 0.1 mg by mouth 2 times daily as needed        DOCUSATE SODIUM PO      Take 100 mg by mouth daily        DULoxetine 30 MG EC capsule    CYMBALTA    180 capsule    Take 1 capsule (30 mg) by mouth 2 times daily Fill at patient request.    Anxiety       estradiol 0.1 MG/GM cream    ESTRACE VAGINAL    42.5 g    Place 2 g vaginally twice a week    Vaginal atrophy       Estradiol-Norethindrone Acet 0.5-0.1 MG Tabs     84 tablet    TAKE ONE TABLET BY MOUTH EVERY DAY    Decreased libido       GABAPENTIN PO      Take 100 mg by mouth 3 times daily        glucose 40 % Gel gel     1 Tube    Take 15-30 g by mouth every 15 minutes as needed.    Chronic pancreatitis (H)       insulin glargine 100 UNIT/ML injection    LANTUS SOLOSTAR    15 mL    Inject 4 Units Subcutaneous every 24 hours    Post-pancreatectomy diabetes (H)       insulin pen needle 32G X 4 MM    BD MAHESH U/F    100 each    Use up to 3 times daily as needed for insulin dosing    Acquired absence of pancreas       lactobacillus Tabs     90 tablet    Take 1 tablet by mouth 3 times daily    Antibiotic-associated diarrhea       levothyroxine 75 MCG tablet    SYNTHROID/LEVOTHROID    90 tablet    Take 1 tablet (75 mcg) by mouth daily    Acquired  hypothyroidism       loratadine 10 MG tablet    CLARITIN     Take 10 mg by mouth daily as needed        modafinil 200 MG tablet    PROVIGIL     Take 2 tablets by mouth Once a Day        MOVANTIK 25 MG Tabs tablet   Generic drug:  naloxegol      Take 25 mg by mouth every morning (before breakfast)        naloxone nasal spray    NARCAN     Spray 4 mg in nostril as needed for opioid reversal        NovoLOG penFILL 100 UNITS/ML injection   Generic drug:  insulin aspart     3 Month    Take as directed    Diabetes mellitus type 1 (H)       NOVOPEN JR (HUNTER) Genny   Generic drug:  Injection Device for insulin      1 Device Inject 1 unit (which is marked as a 1/2 unit on the pen itself)  as needed when eating carb heavy meals.        omeprazole 40 MG capsule    priLOSEC    180 capsule    Take 1 capsule (40 mg) by mouth 2 times daily Take 30-60 minutes before a meal.    Gastroesophageal reflux disease without esophagitis, Nausea, Gastric bypass status for obesity       polyethylene glycol Packet    MIRALAX/GLYCOLAX    7 packet    Take 17 g by mouth daily    Constipation       sennosides 8.6 MG tablet    SENOKOT    120 tablet    Take 2 tablets by mouth 2 times daily    Other constipation       Sharps Container Misc     1 each    For insulin needles    Absence of pancreas, acquired       TRAZODONE HCL PO      Take 50 mg by mouth nightly as needed for sleep        * Notice:  This list has 2 medication(s) that are the same as other medications prescribed for you. Read the directions carefully, and ask your doctor or other care provider to review them with you.

## 2017-09-07 NOTE — NURSING NOTE
"Chief Complaint   Patient presents with     TP-IAT Transplant     4 years ago       Initial /87  Pulse 83  Temp 98.2  F (36.8  C) (Oral)  Resp 16  Ht 1.626 m (5' 4\")  Wt 65.9 kg (145 lb 4.8 oz)  LMP 12/19/2013  SpO2 98%  BMI 24.94 kg/m2 Estimated body mass index is 24.94 kg/(m^2) as calculated from the following:    Height as of this encounter: 1.626 m (5' 4\").    Weight as of this encounter: 65.9 kg (145 lb 4.8 oz).      "

## 2017-09-15 ENCOUNTER — HOSPITAL ENCOUNTER (OUTPATIENT)
Dept: LAB | Facility: CLINIC | Age: 52
Discharge: HOME | End: 2017-09-15
Attending: INTERNAL MEDICINE | Admitting: INTERNAL MEDICINE
Payer: COMMERCIAL

## 2017-09-15 DIAGNOSIS — R78.81 BACTEREMIA: ICD-10-CM

## 2017-09-15 DIAGNOSIS — R78.81 BACTEREMIA: Primary | ICD-10-CM

## 2017-09-15 PROCEDURE — 87040 BLOOD CULTURE FOR BACTERIA: CPT | Mod: 91 | Performed by: INTERNAL MEDICINE

## 2017-09-15 PROCEDURE — 36415 COLL VENOUS BLD VENIPUNCTURE: CPT | Performed by: INTERNAL MEDICINE

## 2017-09-21 LAB
BACTERIA SPEC CULT: NO GROWTH
BACTERIA SPEC CULT: NO GROWTH
Lab: NORMAL
Lab: NORMAL
SPECIMEN SOURCE: NORMAL
SPECIMEN SOURCE: NORMAL

## 2017-09-23 DIAGNOSIS — E03.9 ACQUIRED HYPOTHYROIDISM: ICD-10-CM

## 2017-09-23 NOTE — TELEPHONE ENCOUNTER
Synthroid Oral Tablet 75MCG     Last Written Prescription Date: 09/01/2017  Last Quantity: 90, # refills: 1  Last Office Visit with G, P or [facility] prescribing provider: 06/01/2017   Next 5 appointments (look out 90 days)     Oct 16, 2017  2:30 PM CDT   [facility]    [address] [address] [phone]                   TSH   Date Value Ref Range Status   05/30/2017 3.58 0.40 - 4.00 mU/L Final

## 2017-09-25 RX ORDER — LEVOTHYROXINE SODIUM 75 UG/1
75 TABLET ORAL DAILY
Qty: 90 TABLET | Refills: 1 | OUTPATIENT
Start: 2017-09-25

## 2017-09-25 NOTE — TELEPHONE ENCOUNTER
This was sent for 6 month in June  E-Prescribing Status: Receipt confirmed by pharmacy (6/1/2017 10:41 PM

## 2017-10-16 ENCOUNTER — OFFICE VISIT (OUTPATIENT)
Dept: OBGYN | Facility: CLINIC | Age: 52
End: 2017-10-16
Payer: COMMERCIAL

## 2017-10-16 VITALS
DIASTOLIC BLOOD PRESSURE: 74 MMHG | BODY MASS INDEX: 24.84 KG/M2 | HEART RATE: 84 BPM | SYSTOLIC BLOOD PRESSURE: 136 MMHG | HEIGHT: 64 IN | WEIGHT: 145.5 LBS

## 2017-10-16 DIAGNOSIS — Z00.00 ROUTINE GENERAL MEDICAL EXAMINATION AT A HEALTH CARE FACILITY: Primary | ICD-10-CM

## 2017-10-16 DIAGNOSIS — Z23 NEED FOR PROPHYLACTIC VACCINATION AND INOCULATION AGAINST INFLUENZA: ICD-10-CM

## 2017-10-16 PROCEDURE — 90686 IIV4 VACC NO PRSV 0.5 ML IM: CPT | Performed by: FAMILY MEDICINE

## 2017-10-16 PROCEDURE — 90471 IMMUNIZATION ADMIN: CPT | Performed by: FAMILY MEDICINE

## 2017-10-16 PROCEDURE — 99396 PREV VISIT EST AGE 40-64: CPT | Mod: 25 | Performed by: FAMILY MEDICINE

## 2017-10-16 NOTE — MR AVS SNAPSHOT
After Visit Summary   10/16/2017    Lynn Thompson    MRN: [id number]           Patient Information     Date Of Birth          1965        Visit Information        Provider Department      10/16/2017 2:30 PM Kavitha Spencer,  [facility]        Today's Diagnoses     Routine general medical examination at a health care facility    -  1      Care Instructions    Call Lab [phone] to schedule future labs.   If you are getting cholesterol checked please fast 8 hours     Dr. Kavitha Spencer,     Obstetrics and Gynecology  [facility] [place]                   Follow-ups after your visit        Your next 10 appointments already scheduled     Dec 07, 2017  9:20 AM CST   (Arrive by 9:05 AM)   Return [place] with Adriana Robles MD   [facility] ([facility])    [address] [place] [address] [phone]              Future tests that were ordered for you today     Open Future Orders        Priority Expected Expires Ordered    *MA Screening Digital Bilateral Routine  10/16/2018 10/16/2017    **CBC with platelets FUTURE anytime Routine 10/16/2017 10/16/2018 10/16/2017    **Comprehensive metabolic panel FUTURE anytime Routine 10/16/2017 10/16/2018 10/16/2017    **TSH with free T4 reflex FUTURE anytime Routine 10/16/2017 10/16/2018 10/16/2017    **Lipid panel reflex to direct LDL FUTURE anytime Routine 10/16/2017 10/16/2018 10/16/2017            Who to contact     If you have questions or need follow up information about today's clinic visit or your schedule please contact [facility] directly at [phone].  Normal or non-critical lab and imaging results will be communicated to you by MyChart, letter or phone within 4 business days after the clinic has received the results. If you do not hear from us within 7 days, please contact the clinic through MyChart or  "phone. If you have a critical or abnormal lab result, we will notify you by phone as soon as possible.  Submit refill requests through [employer] or call your pharmacy and they will forward the refill request to us. Please allow 3 business days for your refill to be completed.          Additional Information About Your Visit        [employer] Information     [employer] gives you secure access to your electronic health record. If you see a primary care provider, you can also send messages to your care team and make appointments. If you have questions, please call your primary care clinic.  If you do not have a primary care provider, please call [phone] and they will assist you.        Care EveryWhere ID     This is your Care EveryWhere ID. This could be used by other organizations to access your [place] medical records  [id number]        Your Vitals Were     Pulse Height Last Period BMI (Body Mass Index)          84 5' 4\" (1.626 m) 12/19/2013 24.98 kg/m2         Blood Pressure from Last 3 Encounters:   10/16/17 136/74   09/07/17 150/87   09/01/17 118/72    Weight from Last 3 Encounters:   10/16/17 145 lb 8 oz (66 kg)   09/07/17 145 lb 4.8 oz (65.9 kg)   09/01/17 146 lb (66.2 kg)                 Today's Medication Changes          These changes are accurate as of: 10/16/17  2:56 PM.  If you have any questions, ask your nurse or doctor.               These medicines have changed or have updated prescriptions.        Dose/Directions    insulin glargine 100 UNIT/ML injection   Commonly known as:  LANTUS SOLOSTAR   This may have changed:  when to take this   Used for:  Post-pancreatectomy diabetes (H)        Dose:  4 Units   Inject 4 Units Subcutaneous every 24 hours   Quantity:  15 mL   Refills:  1                Primary Care Provider Office Phone # Fax #    Maryana Brooks [phone] [id number]  [facility] [address]        Equal Access to Services     CHAD SOLANO AH: Nuha mandujano " Soheribertoali, wajose de jesusda luqadaha, qaybta kaalmada johnny, albin romanmikal alejandro. So [facility] [phone].    ATENCIÓN: Si kadi christensen, tiene a rivera disposición servicios gratuitos de asistencia lingüística. Juvencio al [phone].    We comply with applicable federal civil rights laws and [place] laws. We do not discriminate on the basis of race, color, national origin, age, disability, sex, sexual orientation, or gender identity.            Thank you!     Thank you for choosing [facility]  for your care. Our goal is always to provide you with excellent care. Hearing back from our patients is one way we can continue to improve our services. Please take a few minutes to complete the written survey that you may receive in the mail after your visit with us. Thank you!             Your Updated Medication List - Protect others around you: Learn how to safely use, store and throw away your medicines at www.disposemymeds.org.          This list is accurate as of: 10/16/17  2:56 PM.  Always use your most recent med list.                   Brand Name Dispense Instructions for use Diagnosis    ACETAMINOPHEN PO      Take 325 mg by mouth every 6 hours as needed for pain        amylase-lipase-protease 51504 UNITS Tabs tablet    VIOKACE    600 tablet    4-5 aps with meals and 1-2 with snacks    Pancreatic insufficiency       blood glucose lancing device     1 each    Use to test blood sugars 6 times daily or as directed.    Absence of pancreas, acquired       blood glucose monitoring test strip    BILL CONTOUR NEXT    2 Box    Check BS 4-6 times a day    Absence of pancreas, acquired       * buprenorphine HCl-naloxone HCl 2-0.5 MG per film    SUBOXONE     Place 0.5 Film under the tongue every morning        * buprenorphine HCl-naloxone HCl 2-0.5 MG per film    SUBOXONE     Place 0.25 Film under the tongue every evening        busPIRone 15 MG tablet    BUSPAR    120 tablet    Increased dose. 30 mg  two times per day.    Anxiety       CLONIDINE HCL PO      Take 0.1 mg by mouth 2 times daily as needed        DOCUSATE SODIUM PO      Take 100 mg by mouth daily        DULoxetine 30 MG EC capsule    CYMBALTA    180 capsule    Take 1 capsule (30 mg) by mouth 2 times daily Fill at patient request.    Anxiety       estradiol 0.1 MG/GM cream    ESTRACE VAGINAL    42.5 g    Place 2 g vaginally twice a week    Vaginal atrophy       Estradiol-Norethindrone Acet 0.5-0.1 MG Tabs     84 tablet    TAKE ONE TABLET BY MOUTH EVERY DAY    Decreased libido       GABAPENTIN PO      Take 100 mg by mouth 3 times daily        glucose 40 % Gel gel     1 Tube    Take 15-30 g by mouth every 15 minutes as needed.    Chronic pancreatitis (H)       insulin glargine 100 UNIT/ML injection    LANTUS SOLOSTAR    15 mL    Inject 4 Units Subcutaneous every 24 hours    Post-pancreatectomy diabetes (H)       insulin pen needle 32G X 4 MM    BD MAHESH U/F    100 each    Use up to 3 times daily as needed for insulin dosing    Acquired absence of pancreas       lactobacillus Tabs     90 tablet    Take 1 tablet by mouth 3 times daily    Antibiotic-associated diarrhea       levothyroxine 75 MCG tablet    SYNTHROID/LEVOTHROID    90 tablet    Take 1 tablet (75 mcg) by mouth daily    Acquired hypothyroidism       loratadine 10 MG tablet    CLARITIN     Take 10 mg by mouth daily as needed        modafinil 200 MG tablet    PROVIGIL     Take 2 tablets by mouth Once a Day        MOVANTIK 25 MG Tabs tablet   Generic drug:  naloxegol      Take 25 mg by mouth every morning (before breakfast)        naloxone nasal spray    NARCAN     Spray 4 mg in nostril as needed for opioid reversal        NovoLOG penFILL 100 UNITS/ML injection   Generic drug:  insulin aspart     3 Month    Take as directed    Diabetes mellitus type 1 (H)       NOVOPEN JR (GREEN) Genny   Generic drug:  Injection Device for insulin      1 Device Inject 1 unit (which is marked as a 1/2 unit on the pen  itself)  as needed when eating carb heavy meals.        omeprazole 40 MG capsule    priLOSEC    180 capsule    Take 1 capsule (40 mg) by mouth 2 times daily Take 30-60 minutes before a meal.    Gastroesophageal reflux disease without esophagitis, Nausea, Gastric bypass status for obesity       polyethylene glycol Packet    MIRALAX/GLYCOLAX    7 packet    Take 17 g by mouth daily    Constipation       sennosides 8.6 MG tablet    SENOKOT    120 tablet    Take 2 tablets by mouth 2 times daily    Other constipation       Sharps Container Misc     1 each    For insulin needles    Absence of pancreas, acquired       TRAZODONE HCL PO      Take 50 mg by mouth nightly as needed for sleep        * Notice:  This list has 2 medication(s) that are the same as other medications prescribed for you. Read the directions carefully, and ask your doctor or other care provider to review them with you.

## 2017-10-16 NOTE — PATIENT INSTRUCTIONS
Call Lab [phone] to schedule future labs.   If you are getting cholesterol checked please fast 8 hours     Dr. Kavitha Spencer, DO    Obstetrics and Gynecology  [facility] [place]

## 2017-10-16 NOTE — PROGRESS NOTES
Injectable Influenza Immunization Documentation    1.  Is the person to be vaccinated sick today?   No    2. Does the person to be vaccinated have an allergy to a component   of the vaccine?   No    3. Has the person to be vaccinated ever had a serious reaction   to influenza vaccine in the past?   No    4. Has the person to be vaccinated ever had Guillain-Barré syndrome?   No    Form completed by Drea ESTRADA

## 2017-10-16 NOTE — NURSING NOTE
"Chief Complaint   Patient presents with     Gyn Exam       Initial /74  Pulse 84  Ht 5' 4\" (1.626 m)  Wt 145 lb 8 oz (66 kg)  LMP 2013  BMI 24.98 kg/m2 Estimated body mass index is 24.98 kg/(m^2) as calculated from the following:    Height as of this encounter: 5' 4\" (1.626 m).    Weight as of this encounter: 145 lb 8 oz (66 kg).  BP completed using cuff size: regular        The following HM Due: NONE      The following patient reported/Care Every where data was sent to:  P ABSTRACT QUALITY INITIATIVES [[id number]]  NA     patient has appointment for today    Drea ESTRADA               "

## 2017-10-16 NOTE — PROGRESS NOTES
SUBJECTIVE:  Lynn Thompson is an 52 year old  postmenopausal woman   who presents for annual gyn exam. No bleeding, spotting, or discharge noted. Concerns: Pt is doing well, expresses no concerns today.    Estrogen replacement therapy: continuous daily oral regimen  TED exposure: no  History of abnormal Pap smear: No  Family history of uterine or ovarian cancer: No  Regular self breast exam: Yes  History of abnormal mammogram: No  Family history of breast cancer: No  History of abnormal lipids: No    Past Medical History:   Diagnosis Date     Abdominal pain, epigastric , ongoing; goes to pain clinic    probable recurrent spasm sphincter of Oddi causing biliary colic pains      Benign paroxysmal positional vertigo     occ.      Calculus of kidney 5/05    x1 on L side passed, several stones.  Has been tested for oxalate.     Chronic abdominal pain 2013     Chronic pancreatitis (H) 2013     Depressive disorder, not elsewhere classified     also occ panic spells     Diabetes (H)     post pancreatectomy     Dyspepsia and other specified disorders of function of stomach     H. pylori   treated     Headache(784.0)     still periodic HA's ;  often 5X/week     Hypertension 16    Stress related     Iron deficiency anemia secondary to inadequate dietary iron intake     relates to gastric bypass     Other chronic pain      Post-pancreatectomy diabetes (H) 2013     Pyelonephritis, unspecified , 10/8    L side     Regional enteritis of unspecified site 1987    inacive/remission     Sleep apnea     uses Cpap     Spasm of sphincter of Oddi     ERCP with Stent of CBD by Fernandez @ [facility] [place] with sx relief     Spasm of sphincter of Oddi     surgical + endoscopic stenting of pancreatic duct @ [facility] 06     Thyroid nodule 2016     Ureteral calculus 10/2/2012     Vaccination not carried out           Family History   Problem Relation Age of Onset     Family History Negative Mother       Respiratory Father      COPD;  at 69     Genitourinary Problems Father      kidney stones     Substance Abuse Father      Depression Father      Asthma Father      HEART DISEASE Paternal Grandfather      M.I.     Coronary Artery Disease Paternal Grandfather      Hyperlipidemia Paternal Grandfather      Genitourinary Problems Brother      multiple brothers with kidney stones     GASTROINTESTINAL DISEASE Maternal Grandmother      undiagnosed 'gut' issues     Coronary Artery Disease Maternal Grandfather      Hyperlipidemia Maternal Grandfather      CEREBROVASCULAR DISEASE Paternal Grandmother      At the age of 103     Anxiety Disorder Paternal Grandmother      OSTEOPOROSIS Paternal Grandmother      Anxiety Disorder Son      Anxiety Disorder Daughter      Asthma Daughter        Past Surgical History:   Procedure Laterality Date     APPENDECTOMY       C NONSPECIFIC PROCEDURE      R bunion     C NONSPECIFIC PROCEDURE      T & A     C NONSPECIFIC PROCEDURE      partial ileum resection     C NONSPECIFIC PROCEDURE      R ovarian cyst     C NONSPECIFIC PROCEDURE           C NONSPECIFIC PROCEDURE      GALL BLADDER     C NONSPECIFIC PROCEDURE      CBD stent; Dr. Presley     C NONSPECIFIC PROCEDURE   &     colonoscopy     C NONSPECIFIC PROCEDURE      Gastric bypass     CHOLECYSTECTOMY       COLONOSCOPY       COMBINED CYSTOSCOPY, RETROGRADES, URETEROSCOPY, LASER HOLMIUM LITHOTRIPSY URETER(S), INSERT STENT Right 3/23/2015    Procedure: COMBINED CYSTOSCOPY, RETROGRADES, URETEROSCOPY, LASER HOLMIUM LITHOTRIPSY URETER(S), INSERT STENT;  Surgeon: Kennedi Aldana MD;  [facility]     COMBINED CYSTOSCOPY, RETROGRADES, URETEROSCOPY, LASER HOLMIUM LITHOTRIPSY URETER(S), INSERT STENT Right 2015    Procedure: COMBINED CYSTOSCOPY, RETROGRADES, URETEROSCOPY, LASER HOLMIUM LITHOTRIPSY URETER(S), INSERT STENT;  Surgeon: Kennedi Aldana MD;  Location: [facility]  OR     COSMETIC SURGERY  6/24/2002    Tummy tuck     CYSTOSCOPY, RETROGRADES, INSERT STENT URETER(S), COMBINED  10/2/2012    Procedure: COMBINED CYSTOSCOPY, RETROGRADES, INSERT STENT URETER(S);  COMBINED CYSTOSCOPY,  , INSERT LEFT STENT URETER;  Surgeon: Johny Baez MD;  Location: [facility] OR     ENT SURGERY       EXTRACORPOREAL SHOCK WAVE LITHOTRIPSY (ESWL)  10/16/2012    Procedure: EXTRACORPOREAL SHOCK WAVE LITHOTRIPSY (ESWL);  left EXTRACORPOREAL SHOCK WAVE LITHOTRIPSY (ESWL) ;  Surgeon: Johny Baez MD;  Location: [facility] OR     GI SURGERY  12/29/2015    Small bowel obstruction     HC LAP,LYSIS OF ADHESIONS       HERNIA REPAIR  2/2015     LAPAROSCOPIC LYSIS ADHESIONS N/A 2/20/2015    Procedure: LAPAROSCOPIC LYSIS ADHESIONS;  Surgeon: Aaron Early MD;  [facility]     LAPAROSCOPIC LYSIS ADHESIONS N/A 12/29/2015    Procedure: LAPAROSCOPIC LYSIS ADHESIONS;  Surgeon: Aaron Early MD;  [facility]     PANCREATECTOMY, TRANSPLANT AUTO ISLET CELL, COMBINED  5/10/2013    Procedure: COMBINED PANCREATECTOMY, TRANSPLANT AUTO ISLET CELL;  Pancreatectomy, Auto Islet Cell Transplant   hernia repair, jejunostomy tube and liver biopsies with Anesthesia General with block;  Surgeon: Aaron Early MD;  [facility]     TRANSPLANT  5/10/13       Current Outpatient Prescriptions   Medication     Estradiol-Norethindrone Acet 0.5-0.1 MG TABS     lactobacillus TABS     buprenorphine HCl-naloxone HCl (SUBOXONE) 2-0.5 MG per film     DOCUSATE SODIUM PO     GABAPENTIN PO     TRAZODONE HCL PO     naloxegol (MOVANTIK) 25 MG TABS tablet     naloxone (NARCAN) nasal spray     CLONIDINE HCL PO     ACETAMINOPHEN PO     busPIRone (BUSPAR) 15 MG tablet     amylase-lipase-protease (VIOKACE) 67678 UNITS TABS tablet     insulin glargine (LANTUS SOLOSTAR) 100 UNIT/ML injection     insulin pen needle (BD MAHESH U/F) 32G X 4 MM     levothyroxine (SYNTHROID/LEVOTHROID) 75 MCG tablet     buprenorphine HCl-naloxone HCl (SUBOXONE) 2-0.5 MG  per film     DULoxetine (CYMBALTA) 30 MG EC capsule     omeprazole (PRILOSEC) 40 MG capsule     blood glucose (BILL MICROLET 2) lancing device     estradiol (ESTRACE VAGINAL) 0.1 MG/GM vaginal cream     sennosides (SENOKOT) 8.6 MG tablet     Injection Device for insulin (NOVOPEN JR, GREEN,) MODESTA     loratadine (CLARITIN) 10 MG tablet     blood glucose monitoring (BILL CONTOUR NEXT) test strip     polyethylene glycol (MIRALAX/GLYCOLAX) packet     modafinil (PROVIGIL) 200 MG tablet     NOVOLOG PENFILL SOLN 100 UNIT/ML     glucose 40 % GEL     Sharps Container MISC     No current facility-administered medications for this visit.      Allergies   Allergen Reactions     Povidone Iodine Hives     Causes skin to blister     Corticosteroids Other (See Comments)     All oral,IV and injectable steroids are contraindicated (unless in life threatening situations) in Islet Auto transplant recipients. They can cause irreversible loss of islet cell function. Please contact patients transplant care coordinator JONATHAN Carvalho RN at /pager   and Endocrinologist prior to administration.      Nsaids      naprosyn = GI upset     Povidone Iodine      blisters     Sulfasalazine Nausea and Nausea and Vomiting       Social History   Substance Use Topics     Smoking status: Never Smoker     Smokeless tobacco: Never Used     Alcohol use No       Review Of Systems  Ears/Nose/Throat: negative  Respiratory: No shortness of breath, dyspnea on exertion, cough, or hemoptysis  Cardiovascular: negative  Gastrointestinal: negative  Genitourinary: negative  Constitutional, HEENT, cardiovascular, pulmonary, GI, , musculoskeletal, neuro, skin, endocrine and psych systems are negative, except as otherwise noted.    This document serves as a record of the services and decisions personally performed and made by Kavitha Spencer DO. It was created on her behalf by Lisbeth Babcock, a trained medical scribe. The creation of this document  "is based the provider's statements to the medical scribe.  Gonzalo Lisbeth Babcock 2:55 PM, 2017    OBJECTIVE:  /74  Pulse 84  Ht 1.626 m (5' 4\")  Wt 66 kg (145 lb 8 oz)  LMP 2013  BMI 24.98 kg/m2  General appearance: healthy, alert and no distress  Skin: Skin color, texture, turgor normal. No rashes or lesions.  Ears: negative  Nose/Sinuses: Nares normal. Septum midline. Mucosa normal. No drainage or sinus tenderness.  Oropharynx: Lips, mucosa, and tongue normal. Teeth and gums normal.  Neck: Neck supple. No adenopathy. Thyroid symmetric, normal size,, Carotids without bruits.  Lungs: negative, Percussion normal. Good diaphragmatic excursion. Lungs clear  Heart: negative, PMI normal. No lifts, heaves, or thrills. RRR. No murmurs, clicks gallops or rub  Breasts: Inspection negative. No nipple discharge or bleeding. No masses.  Abdomen: Abdomen soft, non-tender. BS normal. No masses, organomegaly  Pelvic: Pelvic:  Pelvic examination with pap  including  External genitalia normal   and vagina normal rugatted not atrophic  Examination of urethra  normal no masses, tenderness, scarring  bladder, no masses or tenderness  Cervix no lesions or discharge  Bimanual exam with   Uterus six weeks size, mid position, mobile, no-tenderness, no descent   Adnexa/parametria normal    ASSESSMENT:  Lynn Thompson is an 52 year old  postmenopausal woman   who presents for annual gyn exam.   Concerns:  No concerns today    PLAN:  Dx:  1)  Pap smear - pathology pending, mammogram - will be scheduled for later date  2)  Labs pending - pt not fasting today, will return for a lab only appointment.  3) Return to clinic for yearly check-up    Rx:  None    PE:  Reviewed health maintenance including diet, regular exercise,   estrogen replacement and periodic exams.    The information in this document, created by the medical scribe for me, accurately reflects the services I personally performed and the decisions " made by me. I have reviewed and approved this document for accuracy prior to leaving the patient care area.  2:55 PM, 10/16/17    Dr. Kavitha Spencer,     Obstetrics and Gynecology  [facility]

## 2017-10-17 ENCOUNTER — TELEPHONE (OUTPATIENT)
Dept: FAMILY MEDICINE | Facility: CLINIC | Age: 52
End: 2017-10-17

## 2017-10-17 NOTE — TELEPHONE ENCOUNTER
Panel Management Review      Patient has the following on her problem list:     Diabetes. Pt is followed by Endo outside of [facility].                                                                                                                                      Moises James CMA           Chart routed to none .

## 2017-10-21 ENCOUNTER — RADIANT APPOINTMENT (OUTPATIENT)
Dept: MAMMOGRAPHY | Facility: CLINIC | Age: 52
End: 2017-10-21
Attending: FAMILY MEDICINE
Payer: COMMERCIAL

## 2017-10-21 DIAGNOSIS — Z00.00 ROUTINE GENERAL MEDICAL EXAMINATION AT A HEALTH CARE FACILITY: ICD-10-CM

## 2017-10-21 PROCEDURE — G0202 SCR MAMMO BI INCL CAD: HCPCS | Mod: TC

## 2017-10-29 DIAGNOSIS — N95.2 VAGINAL ATROPHY: ICD-10-CM

## 2017-10-30 RX ORDER — ESTRADIOL 0.1 MG/G
CREAM VAGINAL
Qty: 42.5 G | Refills: 10 | Status: SHIPPED | OUTPATIENT
Start: 2017-10-30 | End: 2018-12-30

## 2017-10-30 NOTE — TELEPHONE ENCOUNTER
Last OFV with you 10/16/17.  Do you want her to continue this med?  If so please refill.  Thank you  Radha Cabral RN

## 2017-11-07 DIAGNOSIS — T36.95XA ANTIBIOTIC-ASSOCIATED DIARRHEA: ICD-10-CM

## 2017-11-07 DIAGNOSIS — K52.1 ANTIBIOTIC-ASSOCIATED DIARRHEA: ICD-10-CM

## 2017-11-07 RX ORDER — L. ACIDOPHILUS/L.BULGARICUS 1MM CELL
1 TABLET ORAL 3 TIMES DAILY
Qty: 90 TABLET | Refills: 3 | Status: SHIPPED | OUTPATIENT
Start: 2017-11-07 | End: 2017-11-17

## 2017-11-07 NOTE — TELEPHONE ENCOUNTER
LAST OFFICE VISIT: 10/16/17.    lactobacillus TABS      Last Written Prescription Date:  9/1/17  Last Fill Quantity: 90,   # refills: 1  Future Office visit:       Routing refill request to provider for review/approval because:  Drug not on the [facility], [facility] or [facility] refill protocol or controlled substance

## 2017-11-17 ENCOUNTER — OFFICE VISIT (OUTPATIENT)
Dept: FAMILY MEDICINE | Facility: CLINIC | Age: 52
End: 2017-11-17
Payer: COMMERCIAL

## 2017-11-17 VITALS
HEART RATE: 70 BPM | SYSTOLIC BLOOD PRESSURE: 110 MMHG | WEIGHT: 143 LBS | DIASTOLIC BLOOD PRESSURE: 78 MMHG | HEIGHT: 64 IN | TEMPERATURE: 98.7 F | BODY MASS INDEX: 24.41 KG/M2

## 2017-11-17 DIAGNOSIS — R19.5 LOOSE STOOLS: ICD-10-CM

## 2017-11-17 DIAGNOSIS — R11.0 NAUSEA: Primary | ICD-10-CM

## 2017-11-17 DIAGNOSIS — R10.9 ABDOMINAL CRAMPING: ICD-10-CM

## 2017-11-17 LAB
BASOPHILS # BLD AUTO: 0.1 10E9/L (ref 0–0.2)
BASOPHILS NFR BLD AUTO: 0.7 %
DIFFERENTIAL METHOD BLD: NORMAL
EOSINOPHIL # BLD AUTO: 0.1 10E9/L (ref 0–0.7)
EOSINOPHIL NFR BLD AUTO: 1.4 %
ERYTHROCYTE [DISTWIDTH] IN BLOOD BY AUTOMATED COUNT: 13.5 % (ref 10–15)
HCT VFR BLD AUTO: 40.4 % (ref 35–47)
HGB BLD-MCNC: 12.9 G/DL (ref 11.7–15.7)
LYMPHOCYTES # BLD AUTO: 1.5 10E9/L (ref 0.8–5.3)
LYMPHOCYTES NFR BLD AUTO: 21.7 %
MCH RBC QN AUTO: 29.8 PG (ref 26.5–33)
MCHC RBC AUTO-ENTMCNC: 31.9 G/DL (ref 31.5–36.5)
MCV RBC AUTO: 93 FL (ref 78–100)
MONOCYTES # BLD AUTO: 0.6 10E9/L (ref 0–1.3)
MONOCYTES NFR BLD AUTO: 9.1 %
NEUTROPHILS # BLD AUTO: 4.7 10E9/L (ref 1.6–8.3)
NEUTROPHILS NFR BLD AUTO: 67.1 %
PLATELET # BLD AUTO: 354 10E9/L (ref 150–450)
RBC # BLD AUTO: 4.33 10E12/L (ref 3.8–5.2)
WBC # BLD AUTO: 7 10E9/L (ref 4–11)

## 2017-11-17 PROCEDURE — 36415 COLL VENOUS BLD VENIPUNCTURE: CPT | Performed by: FAMILY MEDICINE

## 2017-11-17 PROCEDURE — 99214 OFFICE O/P EST MOD 30 MIN: CPT | Performed by: FAMILY MEDICINE

## 2017-11-17 PROCEDURE — 85025 COMPLETE CBC W/AUTO DIFF WBC: CPT | Performed by: FAMILY MEDICINE

## 2017-11-17 PROCEDURE — 80053 COMPREHEN METABOLIC PANEL: CPT | Performed by: FAMILY MEDICINE

## 2017-11-17 RX ORDER — ONDANSETRON 4 MG/1
4-8 TABLET, ORALLY DISINTEGRATING ORAL
Qty: 20 TABLET | Refills: 3 | Status: SHIPPED | OUTPATIENT
Start: 2017-11-17 | End: 2018-08-20

## 2017-11-17 NOTE — MR AVS SNAPSHOT
After Visit Summary   11/17/2017    Lynn Thompson    MRN: [id number]           Patient Information     Date Of Birth          1965        Visit Information        Provider Department      11/17/2017 2:45 PM Maryana Brooks MD [facility]        Today's Diagnoses     Nausea    -  1    Abdominal cramping        Loose stools           Follow-ups after your visit        Your next 10 appointments already scheduled     Dec 07, 2017  9:20 AM CST   (Arrive by 9:05 AM)   Return Auto Islet with Adriana Robles MD   [facility] Trans[facility] ([facility])    [address] [place] [id number] [phone]              Future tests that were ordered for you today     Open Future Orders        Priority Expected Expires Ordered    Clostridium difficile Toxin B PCR Routine  12/17/2017 11/17/2017    Enteric Bacteria and Virus Panel by DILMA Stool Routine  11/17/2018 11/17/2017    Fecal Lactoferrin Routine  11/17/2018 11/17/2017            Who to contact     If you have questions or need follow up information about today's clinic visit or your schedule please contact [facility] directly at [phone].  Normal or non-critical lab and imaging results will be communicated to you by MyChart, letter or phone within 4 business days after the clinic has received the results. If you do not hear from us within 7 days, please contact the clinic through MyChart or phone. If you have a critical or abnormal lab result, we will notify you by phone as soon as possible.  Submit refill requests through [employer] or call your pharmacy and they will forward the refill request to us. Please allow 3 business days for your refill to be completed.          Additional Information About Your Visit        MyChart Information     [employer] gives you secure access to your electronic health record. If you see a primary care provider, you can also  "send messages to your care team and make appointments. If you have questions, please call your primary care clinic.  If you do not have a primary care provider, please call [phone] and they will assist you.        Care EveryWhere ID     This is your Care EveryWhere ID. This could be used by other organizations to access your [place] medical records  [id number]        Your Vitals Were     Pulse Temperature Height Last Period Breastfeeding? BMI (Body Mass Index)    70 98.7  F (37.1  C) (Oral) 5' 4\" (1.626 m) 12/19/2013 No 24.55 kg/m2       Blood Pressure from Last 3 Encounters:   11/17/17 110/78   10/16/17 136/74   09/07/17 150/87    Weight from Last 3 Encounters:   11/17/17 143 lb (64.9 kg)   10/16/17 145 lb 8 oz (66 kg)   09/07/17 145 lb 4.8 oz (65.9 kg)              We Performed the Following     CBC with platelets and differential     Comprehensive metabolic panel (BMP + Alb, Alk Phos, ALT, AST, Total. Bili, TP)          Today's Medication Changes          These changes are accurate as of: 11/17/17  3:14 PM.  If you have any questions, ask your nurse or doctor.               These medicines have changed or have updated prescriptions.        Dose/Directions    insulin glargine 100 UNIT/ML injection   Commonly known as:  LANTUS SOLOSTAR   This may have changed:  when to take this   Used for:  Post-pancreatectomy diabetes (H)        Dose:  4 Units   Inject 4 Units Subcutaneous every 24 hours   Quantity:  15 mL   Refills:  1         Stop taking these medicines if you haven't already. Please contact your care team if you have questions.     lactobacillus Tabs   Stopped by:  Maryana Brooks MD                    Primary Care Provider Office Phone # Fax #    Maryana Brooks [phone] [id number]  [facility] [address]        Equal Access to Services     CHAD SOLANO AH: Hadii aad ku hadasho Soomaali, waaxda luqadaha, qaybta kaalmada johnny, albin gale " la'wilbertn alejandro. So [facility] [phone].    ATENCIÓN: Si habla fermin, tiene a rivera disposición servicios gratuitos de asistencia lingüística. Juvencio arcos [phone].    We comply with applicable federal civil rights laws and [place] laws. We do not discriminate on the basis of race, color, national origin, age, disability, sex, sexual orientation, or gender identity.            Thank you!     Thank you for choosing [facility]  for your care. Our goal is always to provide you with excellent care. Hearing back from our patients is one way we can continue to improve our services. Please take a few minutes to complete the written survey that you may receive in the mail after your visit with us. Thank you!             Your Updated Medication List - Protect others around you: Learn how to safely use, store and throw away your medicines at www.disposemymeds.org.          This list is accurate as of: 11/17/17  3:14 PM.  Always use your most recent med list.                   Brand Name Dispense Instructions for use Diagnosis    ACETAMINOPHEN PO      Take 325 mg by mouth every 6 hours as needed for pain        amylase-lipase-protease 63039 UNITS Tabs tablet    VIOKACE    600 tablet    4-5 aps with meals and 1-2 with snacks    Pancreatic insufficiency       blood glucose lancing device     1 each    Use to test blood sugars 6 times daily or as directed.    Absence of pancreas, acquired       blood glucose monitoring test strip    BILL CONTOUR NEXT    2 Box    Check BS 4-6 times a day    Absence of pancreas, acquired       * buprenorphine HCl-naloxone HCl 2-0.5 MG per film    SUBOXONE     Place 0.5 Film under the tongue every morning        * buprenorphine HCl-naloxone HCl 2-0.5 MG per film    SUBOXONE     Place 0.25 Film under the tongue every evening        busPIRone 15 MG tablet    BUSPAR    120 tablet    Increased dose. 30 mg two times per day.    Anxiety       CLONIDINE HCL PO      Take 0.1 mg by mouth 2 times  daily as needed        DOCUSATE SODIUM PO      Take 100 mg by mouth daily        DULoxetine 30 MG EC capsule    CYMBALTA    180 capsule    Take 1 capsule (30 mg) by mouth 2 times daily Fill at patient request.    Anxiety       ESTRACE VAGINAL 0.1 MG/GM cream   Generic drug:  estradiol     42.5 g    PLACE 2 GRAMS VAGINALLY TWICE A WEEK    Vaginal atrophy       Estradiol-Norethindrone Acet 0.5-0.1 MG Tabs     84 tablet    TAKE ONE TABLET BY MOUTH EVERY DAY    Decreased libido       GABAPENTIN PO      Take 100 mg by mouth 3 times daily        glucose 40 % Gel gel     1 Tube    Take 15-30 g by mouth every 15 minutes as needed.    Chronic pancreatitis (H)       insulin glargine 100 UNIT/ML injection    LANTUS SOLOSTAR    15 mL    Inject 4 Units Subcutaneous every 24 hours    Post-pancreatectomy diabetes (H)       insulin pen needle 32G X 4 MM    BD MAHESH U/F    100 each    Use up to 3 times daily as needed for insulin dosing    Acquired absence of pancreas       levothyroxine 75 MCG tablet    SYNTHROID/LEVOTHROID    90 tablet    Take 1 tablet (75 mcg) by mouth daily    Acquired hypothyroidism       loratadine 10 MG tablet    CLARITIN     Take 10 mg by mouth daily as needed        modafinil 200 MG tablet    PROVIGIL     Take 2 tablets by mouth Once a Day        MOVANTIK 25 MG Tabs tablet   Generic drug:  naloxegol      Take 25 mg by mouth every morning (before breakfast)        naloxone nasal spray    NARCAN     Spray 4 mg in nostril as needed for opioid reversal        NovoLOG penFILL 100 UNITS/ML injection   Generic drug:  insulin aspart     3 Month    Take as directed    Diabetes mellitus type 1 (H)       NOVOPEN JR (HUNTER) Genny   Generic drug:  Injection Device for insulin      1 Device Inject 1 unit (which is marked as a 1/2 unit on the pen itself)  as needed when eating carb heavy meals.        omeprazole 40 MG capsule    priLOSEC    180 capsule    Take 1 capsule (40 mg) by mouth 2 times daily Take 30-60 minutes  before a meal.    Gastroesophageal reflux disease without esophagitis, Nausea, Gastric bypass status for obesity       polyethylene glycol Packet    MIRALAX/GLYCOLAX    7 packet    Take 17 g by mouth daily    Constipation       sennosides 8.6 MG tablet    SENOKOT    120 tablet    Take 2 tablets by mouth 2 times daily    Other constipation       Sharps Container Misc     1 each    For insulin needles    Absence of pancreas, acquired       TRAZODONE HCL PO      Take 50 mg by mouth nightly as needed for sleep        * Notice:  This list has 2 medication(s) that are the same as other medications prescribed for you. Read the directions carefully, and ask your doctor or other care provider to review them with you.

## 2017-11-17 NOTE — PROGRESS NOTES
"  SUBJECTIVE:   Lynn Thompson is a 52 year old female who presents to clinic today for the following health issues:    Constant nausea past 2-3 weeks.   Worse after eating. No vomiting.   Eating bland diet and taking zofran, somewhat helpful.   Has had issue in the past, but usually self limited and shorter in time course than current.     Having abd cramping, central abdomen, after eating as well. Passes after she passes stool.   Passing loose stools shortly after eating. Sometime \"explosive\" but not watery.   No fevers, does feel chilled at times.   No body aches.   Does feel fatigued.     Hx of cholecystectomy, pancreatectomy, partial ileum resection.   Hx of bowel obstruction.        Problem list and histories reviewed & adjusted, as indicated.  Additional history: as documented    Patient Active Problem List   Diagnosis     Iron deficiency anemia secondary to inadequate dietary iron intake     Gastric bypass status for obesity     Health Care Home     Chronic pain syndrome     Exocrine pancreatic insufficiency     Asplenia     BLU (obstructive sleep apnea)     S/P exploratory laparotomy     Dysthymia     Anxiety     Thyroid nodule     Acquired hypothyroidism     Post-pancreatectomy diabetes (H)     E coli bacteremia     Antibiotic-associated diarrhea     Elevated LFTs     Past Surgical History:   Procedure Laterality Date     APPENDECTOMY       C NONSPECIFIC PROCEDURE      R bunion     C NONSPECIFIC PROCEDURE      T & A     C NONSPECIFIC PROCEDURE      partial ileum resection     C NONSPECIFIC PROCEDURE      R ovarian cyst     C NONSPECIFIC PROCEDURE           C NONSPECIFIC PROCEDURE      GALL BLADDER     C NONSPECIFIC PROCEDURE      CBD stent; Dr. Presley     C NONSPECIFIC PROCEDURE   &     colonoscopy     C NONSPECIFIC PROCEDURE      Gastric bypass     CHOLECYSTECTOMY       COLONOSCOPY       COMBINED CYSTOSCOPY, RETROGRADES, URETEROSCOPY, LASER " HOLMIUM LITHOTRIPSY URETER(S), INSERT STENT Right 3/23/2015    Procedure: COMBINED CYSTOSCOPY, RETROGRADES, URETEROSCOPY, LASER HOLMIUM LITHOTRIPSY URETER(S), INSERT STENT;  Surgeon: Kennedi Aldana MD;  [facility]     COMBINED CYSTOSCOPY, RETROGRADES, URETEROSCOPY, LASER HOLMIUM LITHOTRIPSY URETER(S), INSERT STENT Right 4/20/2015    Procedure: COMBINED CYSTOSCOPY, RETROGRADES, URETEROSCOPY, LASER HOLMIUM LITHOTRIPSY URETER(S), INSERT STENT;  Surgeon: Kennedi Aldana MD;  [facility]     COSMETIC SURGERY  6/24/2002    Tumyakelin balderasck     CYSTOSCOPY, RETROGRADES, INSERT STENT URETER(S), COMBINED  10/2/2012    Procedure: COMBINED CYSTOSCOPY, RETROGRADES, INSERT STENT URETER(S);  COMBINED CYSTOSCOPY,  , INSERT LEFT STENT URETER;  Surgeon: Johny Baez MD;  [facility]     ENT SURGERY       EXTRACORPOREAL SHOCK WAVE LITHOTRIPSY (ESWL)  10/16/2012    Procedure: EXTRACORPOREAL SHOCK WAVE LITHOTRIPSY (ESWL);  left EXTRACORPOREAL SHOCK WAVE LITHOTRIPSY (ESWL) ;  Surgeon: Johny Baez MD;  [facility]     GI SURGERY  12/29/2015    Small bowel obstruction     HC LAP,LYSIS OF ADHESIONS       HERNIA REPAIR  2/2015     LAPAROSCOPIC LYSIS ADHESIONS N/A 2/20/2015    Procedure: LAPAROSCOPIC LYSIS ADHESIONS;  Surgeon: Aaron Early MD;  [facility]     LAPAROSCOPIC LYSIS ADHESIONS N/A 12/29/2015    Procedure: LAPAROSCOPIC LYSIS ADHESIONS;  Surgeon: Aaron Early MD;  [facility]     PANCREATECTOMY, TRANSPLANT AUTO ISLET CELL, COMBINED  5/10/2013    Procedure: COMBINED PANCREATECTOMY, TRANSPLANT AUTO ISLET CELL;  Pancreatectomy, Auto Islet Cell Transplant   hernia repair, jejunostomy tube and liver biopsies with Anesthesia General with block;  Surgeon: Aaron Early MD;  [facility]     TRANSPLANT  5/10/13       Social History   Substance Use Topics     Smoking status: Never Smoker     Smokeless tobacco: Never Used     Alcohol use No     Family History   Problem Relation Age  "of Onset     Family History Negative Mother      Respiratory Father      COPD;  at 69     Genitourinary Problems Father      kidney stones     Substance Abuse Father      Depression Father      Asthma Father      HEART DISEASE Paternal Grandfather      M.I.     Coronary Artery Disease Paternal Grandfather      Hyperlipidemia Paternal Grandfather      Genitourinary Problems Brother      multiple brothers with kidney stones     GASTROINTESTINAL DISEASE Maternal Grandmother      undiagnosed 'gut' issues     Coronary Artery Disease Maternal Grandfather      Hyperlipidemia Maternal Grandfather      CEREBROVASCULAR DISEASE Paternal Grandmother      At the age of 103     Anxiety Disorder Paternal Grandmother      OSTEOPOROSIS Paternal Grandmother      Anxiety Disorder Son      Anxiety Disorder Daughter      Asthma Daughter              Reviewed and updated as needed this visit by clinical staff       Reviewed and updated as needed this visit by Provider         ROS:  Constitutional, HEENT, cardiovascular, pulmonary, gi and gu systems are negative, except as otherwise noted.      OBJECTIVE:   /78 (BP Location: Right arm, Patient Position: Sitting, Cuff Size: Adult Regular)  Pulse 70  Temp 98.7  F (37.1  C) (Oral)  Ht 5' 4\" (1.626 m)  Wt 143 lb (64.9 kg)  LMP 2013  Breastfeeding? No  BMI 24.55 kg/m2  Body mass index is 24.55 kg/(m^2).  GENERAL: healthy, alert and no distress  CV: regular rate and rhythm, normal S1 S2, no S3 or S4, no murmur  ABDOMEN: soft, hyperactive BS, no tympan  SKIN: large vertical midline scar over abdomen, some pain to deep palpation left of umbilicus but not reproducible during auscultation    Diagnostic Test Results:  Results for orders placed or performed in visit on 17 (from the past 24 hour(s))   CBC with platelets and differential   Result Value Ref Range    WBC 7.0 4.0 - 11.0 10e9/L    RBC Count 4.33 3.8 - 5.2 10e12/L    Hemoglobin 12.9 11.7 - 15.7 g/dL    " Hematocrit 40.4 35.0 - 47.0 %    MCV 93 78 - 100 fl    MCH 29.8 26.5 - 33.0 pg    MCHC 31.9 31.5 - 36.5 g/dL    RDW 13.5 10.0 - 15.0 %    Platelet Count 354 150 - 450 10e9/L    Diff Method Automated Method     % Neutrophils 67.1 %    % Lymphocytes 21.7 %    % Monocytes 9.1 %    % Eosinophils 1.4 %    % Basophils 0.7 %    Absolute Neutrophil 4.7 1.6 - 8.3 10e9/L    Absolute Lymphocytes 1.5 0.8 - 5.3 10e9/L    Absolute Monocytes 0.6 0.0 - 1.3 10e9/L    Absolute Eosinophils 0.1 0.0 - 0.7 10e9/L    Absolute Basophils 0.1 0.0 - 0.2 10e9/L     *Note: Due to a large number of results and/or encounters for the requested time period, some results have not been displayed. A complete set of results can be found in Results Review.       ASSESSMENT/PLAN:     1. Nausea - ddx includes AGE, post surgical nausea, gastroparesis, partial SBO, etc. Will start with lab work, as she seems to be stable, not an acute abdomen. Suggested ER visit should pain become worse or more persistent.   - CBC with platelets and differential  - Clostridium difficile Toxin B PCR; Future  - Comprehensive metabolic panel (BMP + Alb, Alk Phos, ALT, AST, Total. Bili, TP)  - Enteric Bacteria and Virus Panel by DILMA Stool; Future  - Fecal Lactoferrin; Future  - ondansetron (ZOFRAN ODT) 4 MG ODT tab; Take 1-2 tablets (4-8 mg) by mouth 3 times daily (before meals)  Dispense: 20 tablet; Refill: 3  - ranitidine (ZANTAC) 150 MG tablet; Take 1 tablet (150 mg) by mouth 2 times daily  Dispense: 60 tablet; Refill: 0    2. Abdominal cramping - see above  - CBC with platelets and differential  - Clostridium difficile Toxin B PCR; Future  - Comprehensive metabolic panel (BMP + Alb, Alk Phos, ALT, AST, Total. Bili, TP)  - Enteric Bacteria and Virus Panel by DIL[facility] Stool; Future  - Fecal Lactoferrin; Future    3. Loose stools- see above  - CBC with platelets and differential  - Clostridium difficile Toxin B PCR; Future  - Comprehensive metabolic panel (BMP + Alb, Alk Phos, ALT,  AST, Total. Bili, TP)  - Enteric Bacteria and Virus Panel by DILMA Stool; Future  - Fecal Lactoferrin; Future      Maryana Brooks MD  [facility]

## 2017-11-17 NOTE — NURSING NOTE
"Chief Complaint   Patient presents with     Nausea       Initial /78 (BP Location: Right arm, Patient Position: Sitting, Cuff Size: Adult Regular)  Pulse 70  Temp 98.7  F (37.1  C) (Oral)  Ht 5' 4\" (1.626 m)  Wt 143 lb (64.9 kg)  LMP 12/19/2013  Breastfeeding? No  BMI 24.55 kg/m2 Estimated body mass index is 24.55 kg/(m^2) as calculated from the following:    Height as of this encounter: 5' 4\" (1.626 m).    Weight as of this encounter: 143 lb (64.9 kg).  Medication Reconciliation: complete     Moises James CMA          "

## 2017-11-18 ENCOUNTER — NURSE TRIAGE (OUTPATIENT)
Dept: NURSING | Facility: CLINIC | Age: 52
End: 2017-11-18

## 2017-11-18 PROCEDURE — 87493 C DIFF AMPLIFIED PROBE: CPT | Performed by: FAMILY MEDICINE

## 2017-11-18 PROCEDURE — 83630 LACTOFERRIN FECAL (QUAL): CPT | Performed by: FAMILY MEDICINE

## 2017-11-18 PROCEDURE — 87506 IADNA-DNA/RNA PROBE TQ 6-11: CPT | Performed by: FAMILY MEDICINE

## 2017-11-19 ENCOUNTER — MYC REFILL (OUTPATIENT)
Dept: FAMILY MEDICINE | Facility: CLINIC | Age: 52
End: 2017-11-19

## 2017-11-19 DIAGNOSIS — R11.0 NAUSEA: ICD-10-CM

## 2017-11-19 DIAGNOSIS — R19.5 LOOSE STOOLS: ICD-10-CM

## 2017-11-19 DIAGNOSIS — R10.9 ABDOMINAL CRAMPING: ICD-10-CM

## 2017-11-19 LAB
ALBUMIN SERPL-MCNC: 3.5 G/DL (ref 3.4–5)
ALP SERPL-CCNC: 125 U/L (ref 40–150)
ALT SERPL W P-5'-P-CCNC: 71 U/L (ref 0–50)
ANION GAP SERPL CALCULATED.3IONS-SCNC: 8 MMOL/L (ref 3–14)
AST SERPL W P-5'-P-CCNC: 92 U/L (ref 0–45)
BILIRUB SERPL-MCNC: 0.2 MG/DL (ref 0.2–1.3)
BUN SERPL-MCNC: 19 MG/DL (ref 7–30)
C COLI+JEJUNI+LARI FUSA STL QL NAA+PROBE: NOT DETECTED
C DIFF TOX B STL QL: NEGATIVE
CALCIUM SERPL-MCNC: 8.8 MG/DL (ref 8.5–10.1)
CHLORIDE SERPL-SCNC: 105 MMOL/L (ref 94–109)
CO2 SERPL-SCNC: 26 MMOL/L (ref 20–32)
CREAT SERPL-MCNC: 0.77 MG/DL (ref 0.52–1.04)
EC STX1 GENE STL QL NAA+PROBE: NOT DETECTED
EC STX2 GENE STL QL NAA+PROBE: NOT DETECTED
ENTERIC PATHOGEN COMMENT: NORMAL
GFR SERPL CREATININE-BSD FRML MDRD: 78 ML/MIN/1.7M2
GLUCOSE SERPL-MCNC: 156 MG/DL (ref 70–99)
LACTOFERRIN STL QL IA: POSITIVE
NOROV GI+II ORF1-ORF2 JNC STL QL NAA+PR: NOT DETECTED
POTASSIUM SERPL-SCNC: 4.3 MMOL/L (ref 3.4–5.3)
PROT SERPL-MCNC: 7.3 G/DL (ref 6.8–8.8)
RVA NSP5 STL QL NAA+PROBE: NOT DETECTED
SALMONELLA SP RPOD STL QL NAA+PROBE: NOT DETECTED
SHIGELLA SP+EIEC IPAH STL QL NAA+PROBE: NOT DETECTED
SODIUM SERPL-SCNC: 139 MMOL/L (ref 133–144)
SPECIMEN SOURCE: NORMAL
V CHOL+PARA RFBL+TRKH+TNAA STL QL NAA+PR: NOT DETECTED
Y ENTERO RECN STL QL NAA+PROBE: NOT DETECTED

## 2017-11-19 RX ORDER — ONDANSETRON 4 MG/1
4-8 TABLET, ORALLY DISINTEGRATING ORAL
Qty: 20 TABLET | Refills: 3 | Status: CANCELLED | OUTPATIENT
Start: 2017-11-19

## 2017-11-19 NOTE — TELEPHONE ENCOUNTER
Looked up 3 stool tests ordered on the [employer] intranet and all are to be stored in the refrigerator. Info relayed to patient.

## 2017-11-19 NOTE — TELEPHONE ENCOUNTER
----- Message from Alicia Shaver sent at 11/18/2017  7:47 PM CST -----  Reason for call:  Other   Patient called regarding (reason for call): How to store stool kit  Additional comments: she has two stool kits with stool samples, clinic is closed now, how should she store them?    Phone number to reach patient:  Cell number on file:  Telephone Information:  Mobile          [phone]      Best Time:  anytime    Can we leave a detailed message on this number?  YES

## 2017-11-20 NOTE — TELEPHONE ENCOUNTER
Message from [employer]:  Original authorizing provider: MD Lynn Urbina would like a refill of the following medications:  ondansetron (ZOFRAN ODT) 4 MG ODT tab [Maryana Brooks MD]    Preferred pharmacy: [id number] [id number] [address]    Comment:

## 2017-11-29 ENCOUNTER — TELEPHONE (OUTPATIENT)
Dept: TRANSPLANT | Facility: CLINIC | Age: 52
End: 2017-11-29

## 2017-11-29 NOTE — TELEPHONE ENCOUNTER
Returned a call to Lynn     She has the following SX which have been occurring for the last 5 weeks.  Pudding like stools  Pain after eating -some improvement after PCP added xantac( in addition to Protonix)  Foul smelling ,oily stools  HX of Chrohns & recent positive Lacto prasanth  Will discuss with GI group.

## 2017-12-01 ENCOUNTER — TELEPHONE (OUTPATIENT)
Dept: TRANSPLANT | Facility: CLINIC | Age: 52
End: 2017-12-01

## 2017-12-01 NOTE — TELEPHONE ENCOUNTER
Call to Lynn to follow up on an earlier conversation. She states that the gastric burning is better with the additions of Zantac. During the conversation it transpired that she has been under an enormous amount of stess recently.( Sisytr in law with brain cancer, Father in law failing ) She is aware that historically , emotional stress has a;ways led to GI issues. She will increase her viokcace by one tablet a meal and see if this helps.

## 2017-12-06 DIAGNOSIS — R11.0 NAUSEA: ICD-10-CM

## 2017-12-06 DIAGNOSIS — F41.9 ANXIETY: ICD-10-CM

## 2017-12-07 ENCOUNTER — OFFICE VISIT (OUTPATIENT)
Dept: TRANSPLANT | Facility: CLINIC | Age: 52
End: 2017-12-07
Attending: PEDIATRICS
Payer: COMMERCIAL

## 2017-12-07 VITALS
OXYGEN SATURATION: 98 % | TEMPERATURE: 98.3 F | WEIGHT: 145.7 LBS | RESPIRATION RATE: 16 BRPM | HEART RATE: 79 BPM | SYSTOLIC BLOOD PRESSURE: 158 MMHG | BODY MASS INDEX: 24.87 KG/M2 | HEIGHT: 64 IN | DIASTOLIC BLOOD PRESSURE: 90 MMHG

## 2017-12-07 DIAGNOSIS — E89.1 POST-PANCREATECTOMY DIABETES (H): Primary | ICD-10-CM

## 2017-12-07 DIAGNOSIS — Z90.410 POST-PANCREATECTOMY DIABETES (H): Primary | ICD-10-CM

## 2017-12-07 DIAGNOSIS — E13.9 POST-PANCREATECTOMY DIABETES (H): Primary | ICD-10-CM

## 2017-12-07 LAB — HBA1C MFR BLD: 6.3 % (ref 0–5.7)

## 2017-12-07 PROCEDURE — 83036 HEMOGLOBIN GLYCOSYLATED A1C: CPT | Mod: ZF | Performed by: PEDIATRICS

## 2017-12-07 PROCEDURE — 99212 OFFICE O/P EST SF 10 MIN: CPT

## 2017-12-07 ASSESSMENT — PAIN SCALES - GENERAL: PAINLEVEL: MILD PAIN (2)

## 2017-12-07 NOTE — PROGRESS NOTES
[facility]  Islet Autotransplant, Diabetes Follow Up    Problem List:  Patient Active Problem List   Diagnosis     Iron deficiency anemia secondary to inadequate dietary iron intake     Gastric bypass status for obesity     Health Care Home     Chronic pain syndrome     Exocrine pancreatic insufficiency     Asplenia     BLU (obstructive sleep apnea)     S/P exploratory laparotomy     Dysthymia     Anxiety     Thyroid nodule     Acquired hypothyroidism     Post-pancreatectomy diabetes (H)     E coli bacteremia     Antibiotic-associated diarrhea     Elevated LFTs       HPI:  Lynn is a 52 year old female here for follow up o total pancreatectomy, islet cell autotransplant, splenectomy, liver biopsy (needle core), choledochoduodenostomy, feeding jejunostomy performed on 5/10/2013.  At the time of the procedure, the patient received 178,100 IEQ, or 3,209 IEQ/kg body weight. She has had two subsequent exploratory laparatomy for small bowel obstruction. Other notable endocrine history includes a complex cystic nodule in mid right thyroid lobe with 1 cm maximum diameter (saw Dr Adorno in 11/2016) which needs annual follow up U/S in Nov 2017, and mild hypothyroidism followed by PCP.  She had an episode of cholangitis and was hospitalized for 4 days 8/24/17 (fevers, RUQ pain, + Ecoli blood cx).    She was on NO insulin at her 1 year, 2 year, 3 year, 4 year transplant anniversaries and restart insulin just after 4 years post-tx, insulin restart date of  6/6/2017.  She is continuing to wean narcotics, on a suboxone wean, now on a 1/2 patch which I believe is a dose of 1 mg of bupronephrone (1/2 of the Suboxone 1- 0.5 mg film) daily, with no other narcotics.     At today's visit, overall Lynn is doing very well!  She has some GI symptoms as noted below that she attributes to stress.  She has had stressors recently including brain tumors in sister in law, failing health of father in law  (who could not come to Thanksgiving dinner), stressors around disability.  We reviewed the followin)  Diabetes:  Lynn continues to have partial islet graft function and is on the same insulin regimen as when I saw her in Sept.  She is doing excellent with this-- very stable #s.  She is 'in tune' with what works for her body, targets lower carb frequent meals and snacks due to her dumping hyper-hypogylcemia pattern.  She notes that she woke up this AM with BG 64 mg/dL which was very unusual for her.  But she reports that she could not sleep, woke up after 1am and ate popcicles and she thinks it was this unrefined sugar precipitating that AM low.      Diabetes history:  Current insulin regimen:  Lantus 4 units per day  Novolog 0.5 units if eating >45g, and correction scale of 0.5 u per 100 >150-- mostly just needs Novolog at dinner time  (just 0.5 unit/day)  TDD: 4.5 unit per day, total insulin on average.      Recent hemoglobin A1c levels:  Today 6.3%  Lab Results   Component Value Date    A1C 6.5 2017    A1C 7.0 2017    A1C 7.2 2016    A1C 7.2 2016    A1C 6.7 2016         Hypoglycemia history:  A few 60s, rare (5% of readings).  The patient has had NO episodes of severe hypoglycemia (seizure, coma, or neuroglycopenic symptoms severe enough to require assistance from another person).  Blood sugars were reviewed from the patient records and/or the meter download.    Average B mg/dL SD 30 mg/dL  Number of blood sugar checks per day 2.9 in this meter-- needs strips for 4 checks per day.     2)  GI symptoms:   With recent stressors has had:  -- nausea, improved now that she started zantac  -- 'dumping' with GI pain/cramping that occurs about 20 minutes after eating  -- oily stools-- went up last week on Viokace now taking 2 with small snacks, up to 5 with meals.  -- still on same suboxone of 1/2 strip per day, sees pain team in 2 weeks.  No other narcotic.  Goal is to get  off completely.  -- no pancreatitis pain, pain better vs pre surgery,  just the discomfort with the dumping type symptoms and nausea.        Review of systems:  Review Of Systems  Skin: negative  Eyes: negative  Ears/Nose/Throat: negative  Respiratory: No shortness of breath, dyspnea on exertion, cough, or hemoptysis  Cardiovascular: negative  Gastrointestinal: as above  Genitourinary: negative  Musculoskeletal: negative  Neurologic: negative  Psychiatric: negative  Hematologic/Lymphatic/Immunologic: negative  Endocrine: as above    Past Medical and Surgical History:  Past Medical History:   Diagnosis Date     Abdominal pain, epigastric 11/05, ongoing; goes to pain clinic    probable recurrent spasm sphincter of Oddi causing biliary colic pains      Benign paroxysmal positional vertigo     occ.      Calculus of kidney 5/05    x1 on L side passed, several stones.  Has been tested for oxalate.     Chronic abdominal pain 7/17/2013     Chronic pancreatitis (H) 7/17/2013     Depressive disorder, not elsewhere classified     also occ panic spells     Diabetes (H)     post pancreatectomy     Dyspepsia and other specified disorders of function of stomach 6/99    H. 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 [place] with sx relief     Spasm of sphincter of Oddi     surgical + endoscopic stenting of pancreatic duct @ [facility] [place] 5/23/06     Thyroid nodule 11/1/2016     Ureteral calculus 10/2/2012     Vaccination not carried out      Past Surgical History:    Procedure Laterality Date     APPENDECTOMY       C NONSPECIFIC PROCEDURE      R bunion     C NONSPECIFIC PROCEDURE      T & A     C NONSPECIFIC PROCEDURE      partial ileum resection     C NONSPECIFIC PROCEDURE      R ovarian cyst     C NONSPECIFIC PROCEDURE           C NONSPECIFIC PROCEDURE      GALL BLADDER     C NONSPECIFIC PROCEDURE      CBD stent; Dr. Presley     C NONSPECIFIC PROCEDURE   &     colonoscopy     C NONSPECIFIC PROCEDURE      Gastric bypass     CHOLECYSTECTOMY       COLONOSCOPY       COMBINED CYSTOSCOPY, RETROGRADES, URETEROSCOPY, LASER HOLMIUM LITHOTRIPSY URETER(S), INSERT STENT Right 3/23/2015    Procedure: COMBINED CYSTOSCOPY, RETROGRADES, URETEROSCOPY, LASER HOLMIUM LITHOTRIPSY URETER(S), INSERT STENT;  Surgeon: Kennedi Aldana MD;  [facility]     COMBINED CYSTOSCOPY, RETROGRADES, URETEROSCOPY, LASER HOLMIUM LITHOTRIPSY URETER(S), INSERT STENT Right 2015    Procedure: COMBINED CYSTOSCOPY, RETROGRADES, URETEROSCOPY, LASER HOLMIUM LITHOTRIPSY URETER(S), INSERT STENT;  Surgeon: Kennedi Aldana MD;  [facility]     COSMETIC SURGERY  2002    Tummy tuck     CYSTOSCOPY, RETROGRADES, INSERT STENT URETER(S), COMBINED  10/2/2012    Procedure: COMBINED CYSTOSCOPY, RETROGRADES, INSERT STENT URETER(S);  COMBINED CYSTOSCOPY,  , INSERT LEFT STENT URETER;  Surgeon: Johny Baez MD;  [facility]     ENT SURGERY       EXTRACORPOREAL SHOCK WAVE LITHOTRIPSY (ESWL)  10/16/2012    Procedure: EXTRACORPOREAL SHOCK WAVE LITHOTRIPSY (ESWL);  left EXTRACORPOREAL SHOCK WAVE LITHOTRIPSY (ESWL) ;  Surgeon: Johny aBez MD;  Location: [facility]     GI SURGERY  2015    Small bowel obstruction     HC LAP,LYSIS OF ADHESIONS       HERNIA REPAIR  2015     LAPAROSCOPIC LYSIS ADHESIONS N/A 2015    Procedure: LAPAROSCOPIC LYSIS ADHESIONS;  Surgeon: Aaron Early MD;  [facility]     LAPAROSCOPIC LYSIS ADHESIONS  "N/A 2015    Procedure: LAPAROSCOPIC LYSIS ADHESIONS;  Surgeon: Aaron Early MD;  [facility]     PANCREATECTOMY, TRANSPLANT AUTO ISLET CELL, COMBINED  5/10/2013    Procedure: COMBINED PANCREATECTOMY, TRANSPLANT AUTO ISLET CELL;  Pancreatectomy, Auto Islet Cell Transplant   hernia repair, jejunostomy tube and liver biopsies with Anesthesia General with block;  Surgeon: Aaron Early MD;  [facility]     TRANSPLANT  5/10/13       Family History:  New changes since last visit:  none  Family History   Problem Relation Age of Onset     Family History Negative Mother      Respiratory Father      COPD;  at 69     Genitourinary Problems Father      kidney stones     Substance Abuse Father      Depression Father      Asthma Father      HEART DISEASE Paternal Grandfather      M.I.     Coronary Artery Disease Paternal Grandfather      Hyperlipidemia Paternal Grandfather      Genitourinary Problems Brother      multiple brothers with kidney stones     GASTROINTESTINAL DISEASE Maternal Grandmother      undiagnosed 'gut' issues     Coronary Artery Disease Maternal Grandfather      Hyperlipidemia Maternal Grandfather      CEREBROVASCULAR DISEASE Paternal Grandmother      At the age of 103     Anxiety Disorder Paternal Grandmother      OSTEOPOROSIS Paternal Grandmother      Anxiety Disorder Son      Anxiety Disorder Daughter      Asthma Daughter        Social History:  Social History     Social History Narrative     She was laid off from her job recently.  She was previously considering new job, but now realizes that her fatigue is much more manageable now that she is not on a full time schedule so is looking at disability.        Physical Exam:  Vitals: /90  Pulse 79  Temp 98.3  F (36.8  C) (Oral)  Resp 16  Ht 1.626 m (5' 4\")  Wt 66.1 kg (145 lb 11.2 oz)  LMP 2013  SpO2 98%  BMI 25.01 kg/m2  BMI= Body mass index is 25.01 kg/(m^2).  General:  Well-appearing, NAD  Psych:  Communicative, " with normal affect         Assessment:  1.  Post pancreatectomy diabetes mellitus, s/p total pancreatectomy and islet autotransplant.    Lynn is a 52 year old with history of chronic pancreatitis who is s/p total pancreatectomy and islet autotransplant.  She was insulin independent until 6/6/2017 (just after 4 years post-tx) and now has partial islet function, basically on a basal only regimen execept that she does use a 0.5 unit dose for a large dinnner, and rarely needs her correction.    She is really doing quite excellent on this regimen, so no changes were made.        Plan:  1.  Changes to current diabetes regimen:  Patient Instructions        1)  Continue the Lantus 4 units daily.     2)  Let's keep the same Novolog dosing:                What your blood glucose is before eating:   How much you plan to eat: 80- 150 mg/dL 151- 250 mg/dL 251 to 350 mg/dL   Less than 45 grams carb 0 0.5 1   More than 45 grams carb 0.5 1 1.5     3)   Continue to check BG pre meal and bedtime.      Follow Up:  Thursday, June 7 - 10:00am      2.  Frequency of blood sugar checks:  premeal and bedtime, and as needed for hypoglycemia (6 strip per day).    3.  Continue routine follow up for autoislet transplant patients:  Mixed meal test (6 mL/kg BoostHP to max of 360 mL) at 3 months, 6 months, and once a year post transplant.  Hemoglobin A1c levels at these time points and quarterly.  4.  Other issues addressed today:  As above    Follow up:  3 mos    Contact me for questions at [phone] or [phone].  Emergency number to reach pediatric endocrinology after hours is [phone].        Adriana Robles MD  , Pediatric Endocrinology and Diabetes  [facility]  [facility]    I spent 20 minutes face to face with Lynn, with more than 50% of that time counseling on plan of care.

## 2017-12-07 NOTE — NURSING NOTE
"Chief Complaint   Patient presents with     TP-IAT Transplant     POD 4 years       Initial /90  Pulse 79  Temp 98.3  F (36.8  C) (Oral)  Resp 16  Ht 1.626 m (5' 4\")  Wt 66.1 kg (145 lb 11.2 oz)  LMP 12/19/2013  SpO2 98%  BMI 25.01 kg/m2 Estimated body mass index is 25.01 kg/(m^2) as calculated from the following:    Height as of this encounter: 1.626 m (5' 4\").    Weight as of this encounter: 66.1 kg (145 lb 11.2 oz).    "

## 2017-12-07 NOTE — TELEPHONE ENCOUNTER
Mychart sent to see how pt is doing with zantac as this was given for a trial.  Buspar was last prescribed for 4 months so pt would have been out last month.    Neel Zarate RN, BSN

## 2017-12-07 NOTE — LETTER
12/7/2017       RE: Lynn Thompson  [id number] [facility] [address]     Dear Colleague,    Thank you for referring your patient, Lynn Thompson, to the [facility] at [facility]. Please see a copy of my visit note below.    [facility]  Islet Autotransplant, Diabetes Follow Up    Problem List:  Patient Active Problem List   Diagnosis     Iron deficiency anemia secondary to inadequate dietary iron intake     Gastric bypass status for obesity     Health Care Home     Chronic pain syndrome     Exocrine pancreatic insufficiency     Asplenia     BLU (obstructive sleep apnea)     S/P exploratory laparotomy     Dysthymia     Anxiety     Thyroid nodule     Acquired hypothyroidism     Post-pancreatectomy diabetes (H)     E coli bacteremia     Antibiotic-associated diarrhea     Elevated LFTs       HPI:  Lynn is a 52 year old female here for follow up o total pancreatectomy, islet cell autotransplant, splenectomy, liver biopsy (needle core), choledochoduodenostomy, feeding jejunostomy performed on 5/10/2013.  At the time of the procedure, the patient received 178,100 IEQ, or 3,209 IEQ/kg body weight. She has had two subsequent exploratory laparatomy for small bowel obstruction. Other notable endocrine history includes a complex cystic nodule in mid right thyroid lobe with 1 cm maximum diameter (saw Dr Adorno in 11/2016) which needs annual follow up U/S in Nov 2017, and mild hypothyroidism followed by PCP.  She had an episode of cholangitis and was hospitalized for 4 days 8/24/17 (fevers, RUQ pain, + Ecoli blood cx).    She was on NO insulin at her 1 year, 2 year, 3 year, 4 year transplant anniversaries and restart insulin just after 4 years post-tx, insulin restart date of  6/6/2017.  She is continuing to wean narcotics, on a suboxone wean, now on a 1/2 patch which I believe is a dose of 1 mg of bupronephrone (1/2 of  the Suboxone 1- 0.5 mg film) daily, with no other narcotics.     At today's visit, overall Lynn is doing very well!  She has some GI symptoms as noted below that she attributes to stress.  She has had stressors recently including brain tumors in sister in law, failing health of father in law (who could not come to Thanksgiving dinner), stressors around disability.  We reviewed the followin)  Diabetes:  Lynn continues to have partial islet graft function and is on the same insulin regimen as when I saw her in Sept.  She is doing excellent with this-- very stable #s.  She is 'in tune' with what works for her body, targets lower carb frequent meals and snacks due to her dumping hyper-hypogylcemia pattern.  She notes that she woke up this AM with BG 64 mg/dL which was very unusual for her.  But she reports that she could not sleep, woke up after 1am and ate popcicles and she thinks it was this unrefined sugar precipitating that AM low.      Diabetes history:  Current insulin regimen:  Lantus 4 units per day  Novolog 0.5 units if eating >45g, and correction scale of 0.5 u per 100 >150-- mostly just needs Novolog at dinner time  (just 0.5 unit/day)  TDD: 4.5 unit per day, total insulin on average.      Recent hemoglobin A1c levels:  Today 6.3%  Lab Results   Component Value Date    A1C 6.5 2017    A1C 7.0 2017    A1C 7.2 2016    A1C 7.2 2016    A1C 6.7 2016         Hypoglycemia history:  A few 60s, rare (5% of readings).  The patient has had NO episodes of severe hypoglycemia (seizure, coma, or neuroglycopenic symptoms severe enough to require assistance from another person).  Blood sugars were reviewed from the patient records and/or the meter download.    Average B mg/dL SD 30 mg/dL  Number of blood sugar checks per day 2.9 in this meter-- needs strips for 4 checks per day.     2)  GI symptoms:   With recent stressors has had:  -- nausea, improved now that she started  zantac  -- 'dumping' with GI pain/cramping that occurs about 20 minutes after eating  -- oily stools-- went up last week on Viokace now taking 2 with small snacks, up to 5 with meals.  -- still on same suboxone of 1/2 strip per day, sees pain team in 2 weeks.  No other narcotic.  Goal is to get off completely.  -- no pancreatitis pain, pain better vs pre surgery,  just the discomfort with the dumping type symptoms and nausea.        Review of systems:  Review Of Systems  Skin: negative  Eyes: negative  Ears/Nose/Throat: negative  Respiratory: No shortness of breath, dyspnea on exertion, cough, or hemoptysis  Cardiovascular: negative  Gastrointestinal: as above  Genitourinary: negative  Musculoskeletal: negative  Neurologic: negative  Psychiatric: negative  Hematologic/Lymphatic/Immunologic: negative  Endocrine: as above    Past Medical and Surgical History:  Past Medical History:   Diagnosis Date     Abdominal pain, epigastric 11/05, ongoing; goes to pain clinic    probable recurrent spasm sphincter of Oddi causing biliary colic pains      Benign paroxysmal positional vertigo     occ.      Calculus of kidney 5/05    x1 on L side passed, several stones.  Has been tested for oxalate.     Chronic abdominal pain 7/17/2013     Chronic pancreatitis (H) 7/17/2013     Depressive disorder, not elsewhere classified     also occ panic spells     Diabetes (H)     post pancreatectomy     Dyspepsia and other specified disorders of function of stomach 6/99    H. 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     Pyelonephritis, unspecified 5/04, 10/8    L side     Regional enteritis of unspecified site 1987    inacive/remission     Sleep apnea     uses Cpap     Spasm of sphincter of  Oddi     [place] [facility]     Spasm of sphincter of Oddi     surgical + endoscopic stenting of pancreatic duct [place] [facility] [place]     Thyroid nodule 2016     Ureteral calculus 10/2/2012     Vaccination not carried out      Past Surgical History:   Procedure Laterality Date     APPENDECTOMY       C NONSPECIFIC PROCEDURE      R bunion     C NONSPECIFIC PROCEDURE      T & A     C NONSPECIFIC PROCEDURE      partial ileum resection     C NONSPECIFIC PROCEDURE      R ovarian cyst     C NONSPECIFIC PROCEDURE           C NONSPECIFIC PROCEDURE      GALL BLADDER     C NONSPECIFIC PROCEDURE      CBD stent; Dr. Presley     C NONSPECIFIC PROCEDURE   &     colonoscopy     C NONSPECIFIC PROCEDURE      Gastric bypass     CHOLECYSTECTOMY       COLONOSCOPY       COMBINED CYSTOSCOPY, RETROGRADES, URETEROSCOPY, LASER HOLMIUM LITHOTRIPSY URETER(S), INSERT STENT Right 3/23/2015    Procedure: COMBINED CYSTOSCOPY, RETROGRADES, URETEROSCOPY, LASER HOLMIUM LITHOTRIPSY URETER(S), INSERT STENT;  Surgeon: Kennedi Aldana MD;  Location: [facility]     COMBINED CYSTOSCOPY, RETROGRADES, URETEROSCOPY, LASER HOLMIUM LITHOTRIPSY URETER(S), INSERT STENT Right 2015    Procedure: COMBINED CYSTOSCOPY, RETROGRADES, URETEROSCOPY, LASER HOLMIUM LITHOTRIPSY URETER(S), INSERT STENT;  Surgeon: Kennedi Aldana MD;  Location: [facility] OR     COSMETIC SURGERY  2002    Tummy tuck     CYSTOSCOPY, RETROGRADES, INSERT STENT URETER(S), COMBINED  10/2/2012    Procedure: COMBINED CYSTOSCOPY, RETROGRADES, INSERT STENT URETER(S);  COMBINED CYSTOSCOPY,  , INSERT LEFT STENT URETER;  Surgeon: Johny Baez MD;  Location: [facility] OR     ENT SURGERY       EXTRACORPOREAL SHOCK WAVE LITHOTRIPSY (ESWL)  10/16/2012    Procedure: EXTRACORPOREAL SHOCK WAVE LITHOTRIPSY (ESWL);  left EXTRACORPOREAL SHOCK WAVE LITHOTRIPSY (ESWL) ;  Surgeon: Johny Baez MD;  Location:  [facility] OR     GI SURGERY  2015    Small bowel obstruction     HC LAP,LYSIS OF ADHESIONS       HERNIA REPAIR  2015     LAPAROSCOPIC LYSIS ADHESIONS N/A 2015    Procedure: LAPAROSCOPIC LYSIS ADHESIONS;  Surgeon: Aaron Early MD;  [facility]     LAPAROSCOPIC LYSIS ADHESIONS N/A 2015    Procedure: LAPAROSCOPIC LYSIS ADHESIONS;  Surgeon: Aaron Early MD;  [facility]     PANCREATECTOMY, TRANSPLANT AUTO ISLET CELL, COMBINED  5/10/2013    Procedure: COMBINED PANCREATECTOMY, TRANSPLANT AUTO ISLET CELL;  Pancreatectomy, Auto Islet Cell Transplant   hernia repair, jejunostomy tube and liver biopsies with Anesthesia General with block;  Surgeon: Aaron Early MD;  [facility] OR     TRANSPLANT  5/10/13       Family History:  New changes since last visit:  none  Family History   Problem Relation Age of Onset     Family History Negative Mother      Respiratory Father      COPD;  at 69     Genitourinary Problems Father      kidney stones     Substance Abuse Father      Depression Father      Asthma Father      HEART DISEASE Paternal Grandfather      M.I.     Coronary Artery Disease Paternal Grandfather      Hyperlipidemia Paternal Grandfather      Genitourinary Problems Brother      multiple brothers with kidney stones     GASTROINTESTINAL DISEASE Maternal Grandmother      undiagnosed 'gut' issues     Coronary Artery Disease Maternal Grandfather      Hyperlipidemia Maternal Grandfather      CEREBROVASCULAR DISEASE Paternal Grandmother      At the age of 103     Anxiety Disorder Paternal Grandmother      OSTEOPOROSIS Paternal Grandmother      Anxiety Disorder Son      Anxiety Disorder Daughter      Asthma Daughter        Social History:  Social History     Social History Narrative     She was laid off from her job recently.  She was previously considering new job, but now realizes that her fatigue is much more manageable now that she is not on a full time schedule so is looking at disability.   "      Physical Exam:  Vitals: /90  Pulse 79  Temp 98.3  F (36.8  C) (Oral)  Resp 16  Ht 1.626 m (5' 4\")  Wt 66.1 kg (145 lb 11.2 oz)  LMP 12/19/2013  SpO2 98%  BMI 25.01 kg/m2  BMI= Body mass index is 25.01 kg/(m^2).  General:  Well-appearing, NAD  Psych:  Communicative, with normal affect         Assessment:  1.  Post pancreatectomy diabetes mellitus, s/p total pancreatectomy and islet autotransplant.    Lynn is a 52 year old with history of chronic pancreatitis who is s/p total pancreatectomy and islet autotransplant.  She was insulin independent until 6/6/2017 (just after 4 years post-tx) and now has partial islet function, basically on a basal only regimen execept that she does use a 0.5 unit dose for a large dinnner, and rarely needs her correction.    She is really doing quite excellent on this regimen, so no changes were made.        Plan:  1.  Changes to current diabetes regimen:  Patient Instructions        1)  Continue the Lantus 4 units daily.     2)  Let's keep the same Novolog dosing:                What your blood glucose is before eating:   How much you plan to eat: 80- 150 mg/dL 151- 250 mg/dL 251 to 350 mg/dL   Less than 45 grams carb 0 0.5 1   More than 45 grams carb 0.5 1 1.5     3)   Continue to check BG pre meal and bedtime.      Follow Up:  Thursday, June 7 - 10:00am      2.  Frequency of blood sugar checks:  premeal and bedtime, and as needed for hypoglycemia (6 strip per day).    3.  Continue routine follow up for autoislet transplant patients:  Mixed meal test (6 mL/kg BoostHP to max of 360 mL) at 3 months, 6 months, and once a year post transplant.  Hemoglobin A1c levels at these time points and quarterly.  4.  Other issues addressed today:  As above    Follow up:  3 mos    Contact me for questions at [phone] or [phone].  Emergency number to reach pediatric endocrinology after hours is [phone].        Adriana Robles MD  , Pediatric " Endocrinology and Diabetes  [facility]  [facility]    I spent 20 minutes face to face with Lynn, with more than 50% of that time counseling on plan of care.    Again, thank you for allowing me to participate in the care of your patient.      Sincerely,    Adriana Robles MD

## 2017-12-07 NOTE — MR AVS SNAPSHOT
After Visit Summary   12/7/2017    Lynn Thompson    MRN: [id number]           Patient Information     Date Of Birth          1965        Visit Information        Provider Department      12/7/2017 9:20 AM Adriana Robles MD [facility] Solid Organ Transplant        Today's Diagnoses     Post-pancreatectomy diabetes (H)    -  1      Care Instructions       1)  Continue the Lantus 4 units daily.     2)  Let's keep the same Novolog dosing:                What your blood glucose is before eating:   How much you plan to eat: 80- 150 mg/dL 151- 250 mg/dL 251 to 350 mg/dL   Less than 45 grams carb 0 0.5 1   More than 45 grams carb 0.5 1 1.5     3)   Continue to check BG pre meal and bedtime.      Follow Up:  Thursday, June 7 - 10:00am          Follow-ups after your visit        Follow-up notes from your care team     Return in about 6 months (around 6/7/2018).      Who to contact     If you have questions or need follow up information about today's clinic visit or your schedule please contact [facility] SOL[facility]GAN TRANSPLANT directly at [phone].  Normal or non-critical lab and imaging results will be communicated to you by [employer], letter or phone within 4 business days after the clinic has received the results. If you do not hear from us within 7 days, please contact the clinic through [employer] or phone. If you have a critical or abnormal lab result, we will notify you by phone as soon as possible.  Submit refill requests through [employer] or call your pharmacy and they will forward the refill request to us. Please allow 3 business days for your refill to be completed.          Additional Information About Your Visit        MyChart Information     [employer] gives you secure access to your electronic health record. If you see a primary care provider, you can also send messages to your care team and make appointments. If you have questions, please call your primary care clinic.  If you do not have a  "primary care provider, please call [phone] and they will assist you.        Care EveryWhere ID     This is your Care EveryWhere ID. This could be used by other organizations to access your [place] medical records  [id number]        Your Vitals Were     Pulse Temperature Respirations Height Last Period Pulse Oximetry    79 98.3  F (36.8  C) (Oral) 16 1.626 m (5' 4\") 12/19/2013 98%    BMI (Body Mass Index)                   25.01 kg/m2            Blood Pressure from Last 3 Encounters:   12/07/17 158/90   11/17/17 110/78   10/16/17 136/74    Weight from Last 3 Encounters:   12/07/17 66.1 kg (145 lb 11.2 oz)   11/17/17 64.9 kg (143 lb)   10/16/17 66 kg (145 lb 8 oz)              Today, you had the following     No orders found for display         Today's Medication Changes          These changes are accurate as of: 12/7/17  9:30 AM.  If you have any questions, ask your nurse or doctor.               These medicines have changed or have updated prescriptions.        Dose/Directions    insulin glargine 100 UNIT/ML injection   Commonly known as:  LANTUS SOLOSTAR   This may have changed:  when to take this   Used for:  Post-pancreatectomy diabetes (H)        Dose:  4 Units   Inject 4 Units Subcutaneous every 24 hours   Quantity:  15 mL   Refills:  1                Primary Care Provider Office Phone # Fax #    Maryana Sivakumar Brooks [phone] [address] [facility] [address]        Equal Access to Services     CA SOLANO AH: Hadii kapil ku hadasho Soheribertoali, waaxda luqadaha, qaybta kaalmada adeegyada, albin allen. So [facility] [phone].    ATENCIÓN: Si habla marckañol, tiene a rivera disposición servicios gratuitos de asistencia lingüística. Llame al [phone].    We comply with applicable federal civil rights laws and [place] laws. We do not discriminate on the basis of race, color, national origin, age, disability, sex, sexual orientation, or gender " identity.            Thank you!     Thank you for choosing [facility] SOLID ORGAN TRANSPLANT  for your care. Our goal is always to provide you with excellent care. Hearing back from our patients is one way we can continue to improve our services. Please take a few minutes to complete the written survey that you may receive in the mail after your visit with us. Thank you!             Your Updated Medication List - Protect others around you: Learn how to safely use, store and throw away your medicines at www.disposemymeds.org.          This list is accurate as of: 12/7/17  9:30 AM.  Always use your most recent med list.                   Brand Name Dispense Instructions for use Diagnosis    ACETAMINOPHEN PO      Take 325 mg by mouth every 6 hours as needed for pain        amylase-lipase-protease 24507 UNITS Tabs tablet    VIOKACE    600 tablet    4-5 aps with meals and 1-2 with snacks    Pancreatic insufficiency       blood glucose lancing device     1 each    Use to test blood sugars 6 times daily or as directed.    Absence of pancreas, acquired       blood glucose monitoring test strip    BILL CONTOUR NEXT    2 Box    Check BS 4-6 times a day    Absence of pancreas, acquired       * buprenorphine HCl-naloxone HCl 2-0.5 MG per film    SUBOXONE     Place 0.5 Film under the tongue every morning        * buprenorphine HCl-naloxone HCl 2-0.5 MG per film    SUBOXONE     Place 0.25 Film under the tongue every evening        busPIRone 15 MG tablet    BUSPAR    120 tablet    Increased dose. 30 mg two times per day.    Anxiety       CLONIDINE HCL PO      Take 0.1 mg by mouth 2 times daily as needed        DOCUSATE SODIUM PO      Take 100 mg by mouth daily        DULoxetine 30 MG EC capsule    CYMBALTA    180 capsule    Take 1 capsule (30 mg) by mouth 2 times daily Fill at patient request.    Anxiety       ESTRACE VAGINAL 0.1 MG/GM cream   Generic drug:  estradiol     42.5 g    PLACE 2 GRAMS VAGINALLY TWICE A WEEK    Vaginal  atrophy       Estradiol-Norethindrone Acet 0.5-0.1 MG Tabs     84 tablet    TAKE ONE TABLET BY MOUTH EVERY DAY    Decreased libido       GABAPENTIN PO      Take 100 mg by mouth 3 times daily        glucose 40 % Gel gel     1 Tube    Take 15-30 g by mouth every 15 minutes as needed.    Chronic pancreatitis (H)       insulin glargine 100 UNIT/ML injection    LANTUS SOLOSTAR    15 mL    Inject 4 Units Subcutaneous every 24 hours    Post-pancreatectomy diabetes (H)       insulin pen needle 32G X 4 MM    BD MAHESH U/F    100 each    Use up to 3 times daily as needed for insulin dosing    Acquired absence of pancreas       levothyroxine 75 MCG tablet    SYNTHROID/LEVOTHROID    90 tablet    Take 1 tablet (75 mcg) by mouth daily    Acquired hypothyroidism       loratadine 10 MG tablet    CLARITIN     Take 10 mg by mouth daily as needed        modafinil 200 MG tablet    PROVIGIL     Take 2 tablets by mouth Once a Day        MOVANTIK 25 MG Tabs tablet   Generic drug:  naloxegol      Take 25 mg by mouth every morning (before breakfast)        naloxone nasal spray    NARCAN     Spray 4 mg in nostril as needed for opioid reversal        NovoLOG penFILL 100 UNITS/ML injection   Generic drug:  insulin aspart     3 Month    Take as directed    Diabetes mellitus type 1 (H)       NOVOPEN JR (HUNTER) Genny   Generic drug:  Injection Device for insulin      1 Device Inject 1 unit (which is marked as a 1/2 unit on the pen itself)  as needed when eating carb heavy meals.        omeprazole 40 MG capsule    priLOSEC    180 capsule    Take 1 capsule (40 mg) by mouth 2 times daily Take 30-60 minutes before a meal.    Gastroesophageal reflux disease without esophagitis, Nausea, Gastric bypass status for obesity       ondansetron 4 MG ODT tab    ZOFRAN ODT    20 tablet    Take 1-2 tablets (4-8 mg) by mouth 3 times daily (before meals)    Nausea       polyethylene glycol Packet    MIRALAX/GLYCOLAX    7 packet    Take 17 g by mouth daily     Constipation       ranitidine 150 MG tablet    ZANTAC    60 tablet    Take 1 tablet (150 mg) by mouth 2 times daily    Nausea       sennosides 8.6 MG tablet    SENOKOT    120 tablet    Take 2 tablets by mouth 2 times daily    Other constipation       Sharps Container Misc     1 each    For insulin needles    Absence of pancreas, acquired       TRAZODONE HCL PO      Take 50 mg by mouth nightly as needed for sleep        * Notice:  This list has 2 medication(s) that are the same as other medications prescribed for you. Read the directions carefully, and ask your doctor or other care provider to review them with you.

## 2017-12-07 NOTE — PATIENT INSTRUCTIONS
1)  Continue the Lantus 4 units daily.     2)  Let's keep the same Novolog dosing:                What your blood glucose is before eating:   How much you plan to eat: 80- 150 mg/dL 151- 250 mg/dL 251 to 350 mg/dL   Less than 45 grams carb 0 0.5 1   More than 45 grams carb 0.5 1 1.5     3)   Continue to check BG pre meal and bedtime.      Follow Up:  Thursday, June 7 - 10:00am

## 2017-12-08 DIAGNOSIS — F41.9 ANXIETY: ICD-10-CM

## 2017-12-08 RX ORDER — LORAZEPAM 0.5 MG/1
0.5 TABLET ORAL EVERY 8 HOURS PRN
Qty: 30 TABLET | Refills: 3 | Status: SHIPPED | OUTPATIENT
Start: 2017-12-08 | End: 2019-04-27

## 2017-12-08 RX ORDER — BUSPIRONE HYDROCHLORIDE 15 MG/1
TABLET ORAL
Qty: 120 TABLET | Refills: 3 | Status: SHIPPED | OUTPATIENT
Start: 2017-12-08 | End: 2019-04-27

## 2017-12-08 NOTE — TELEPHONE ENCOUNTER
Pt having increased stressors and requesting refill on lorazepam     She does take buspar on daily basis.     Please advise    Sarika Osullivan RN

## 2017-12-08 NOTE — TELEPHONE ENCOUNTER
Routing refill request to provider for review/approval because:  Trial of zantac   Unsure if pt still taking as it appears to be a break in therapy.    Neel Zarate RN, BSN

## 2017-12-31 DIAGNOSIS — E03.9 ACQUIRED HYPOTHYROIDISM: ICD-10-CM

## 2018-01-02 RX ORDER — LEVOTHYROXINE SODIUM 75 UG/1
75 TABLET ORAL DAILY
Qty: 90 TABLET | Refills: 1 | Status: SHIPPED | OUTPATIENT
Start: 2018-01-02 | End: 2018-01-12

## 2018-01-02 NOTE — TELEPHONE ENCOUNTER
Requested Prescriptions   Pending Prescriptions Disp Refills     levothyroxine (SYNTHROID/LEVOTHROID) 75 MCG tablet 90 tablet 1    Last Written Prescription Date:  06/01/2017  Last Fill Quantity: 90 TABLET,  # refills: 1   Last Office Visit with G, P or [facility] prescribing provider:  11/17/2017   Future Office Visit:      Sig: Take 1 tablet (75 mcg) by mouth daily    Thyroid Protocol Passed    12/31/2017  3:55 PM       Passed - Patient is 12 years or older       Passed - Recent or future visit with authorizing provider's specialty    Patient had office visit in the last year or has a visit in the next 30 days with authorizing provider.  See chart review.              Passed - Normal TSH on file in past 12 months    Recent Labs   Lab Test  05/30/17   0902   TSH  3.58             Passed - No active pregnancy on record    If patient is pregnant or has had a positive pregnancy test, please check TSH.         Passed - No positive pregnancy test in past 12 months    If patient is pregnant or has had a positive pregnancy test, please check TSH.            Edy RAMOST

## 2018-01-02 NOTE — TELEPHONE ENCOUNTER
Prescription approved per [facility] Refill Protocol.  Sarika Osullivan RN    Sent my chart PHQ for updating       Sarika Osullivan RN

## 2018-01-04 DIAGNOSIS — Z00.00 ROUTINE GENERAL MEDICAL EXAMINATION AT A HEALTH CARE FACILITY: ICD-10-CM

## 2018-01-04 LAB
ALBUMIN SERPL-MCNC: 3.4 G/DL (ref 3.4–5)
ALP SERPL-CCNC: 97 U/L (ref 40–150)
ALT SERPL W P-5'-P-CCNC: 27 U/L (ref 0–50)
ANION GAP SERPL CALCULATED.3IONS-SCNC: 3 MMOL/L (ref 3–14)
AST SERPL W P-5'-P-CCNC: 32 U/L (ref 0–45)
BILIRUB SERPL-MCNC: 0.4 MG/DL (ref 0.2–1.3)
BUN SERPL-MCNC: 17 MG/DL (ref 7–30)
CALCIUM SERPL-MCNC: 8.3 MG/DL (ref 8.5–10.1)
CHLORIDE SERPL-SCNC: 106 MMOL/L (ref 94–109)
CHOLEST SERPL-MCNC: 208 MG/DL
CO2 SERPL-SCNC: 33 MMOL/L (ref 20–32)
CREAT SERPL-MCNC: 0.69 MG/DL (ref 0.52–1.04)
ERYTHROCYTE [DISTWIDTH] IN BLOOD BY AUTOMATED COUNT: 13.7 % (ref 10–15)
GFR SERPL CREATININE-BSD FRML MDRD: 89 ML/MIN/1.7M2
GLUCOSE SERPL-MCNC: 81 MG/DL (ref 70–99)
HCT VFR BLD AUTO: 39.3 % (ref 35–47)
HDLC SERPL-MCNC: 95 MG/DL
HGB BLD-MCNC: 12.5 G/DL (ref 11.7–15.7)
LDLC SERPL CALC-MCNC: 102 MG/DL
MCH RBC QN AUTO: 29.9 PG (ref 26.5–33)
MCHC RBC AUTO-ENTMCNC: 31.8 G/DL (ref 31.5–36.5)
MCV RBC AUTO: 94 FL (ref 78–100)
NONHDLC SERPL-MCNC: 113 MG/DL
PLATELET # BLD AUTO: 346 10E9/L (ref 150–450)
POTASSIUM SERPL-SCNC: 4.4 MMOL/L (ref 3.4–5.3)
PROT SERPL-MCNC: 6.4 G/DL (ref 6.8–8.8)
RBC # BLD AUTO: 4.18 10E12/L (ref 3.8–5.2)
SODIUM SERPL-SCNC: 142 MMOL/L (ref 133–144)
T4 FREE SERPL-MCNC: 0.72 NG/DL (ref 0.76–1.46)
TRIGL SERPL-MCNC: 57 MG/DL
TSH SERPL DL<=0.005 MIU/L-ACNC: 6.66 MU/L (ref 0.4–4)
WBC # BLD AUTO: 5.4 10E9/L (ref 4–11)

## 2018-01-04 PROCEDURE — 85027 COMPLETE CBC AUTOMATED: CPT | Performed by: FAMILY MEDICINE

## 2018-01-04 PROCEDURE — 84439 ASSAY OF FREE THYROXINE: CPT | Performed by: FAMILY MEDICINE

## 2018-01-04 PROCEDURE — 36415 COLL VENOUS BLD VENIPUNCTURE: CPT | Performed by: FAMILY MEDICINE

## 2018-01-04 PROCEDURE — 80053 COMPREHEN METABOLIC PANEL: CPT | Performed by: FAMILY MEDICINE

## 2018-01-04 PROCEDURE — 84443 ASSAY THYROID STIM HORMONE: CPT | Performed by: FAMILY MEDICINE

## 2018-01-04 PROCEDURE — 80061 LIPID PANEL: CPT | Performed by: FAMILY MEDICINE

## 2018-01-05 NOTE — PROGRESS NOTES
Lynn, several of your lab tests are abnormal.  I would ask that you make an appointment to see Dr Spencer when she returns to the office next week  Thank you  Ranulfo Gutierrez M.D.

## 2018-01-10 ENCOUNTER — OFFICE VISIT (OUTPATIENT)
Dept: FAMILY MEDICINE | Facility: CLINIC | Age: 53
End: 2018-01-10
Payer: COMMERCIAL

## 2018-01-10 VITALS
RESPIRATION RATE: 14 BRPM | HEART RATE: 62 BPM | DIASTOLIC BLOOD PRESSURE: 80 MMHG | BODY MASS INDEX: 24.59 KG/M2 | TEMPERATURE: 98.4 F | WEIGHT: 144 LBS | HEIGHT: 64 IN | SYSTOLIC BLOOD PRESSURE: 110 MMHG

## 2018-01-10 DIAGNOSIS — E03.9 ACQUIRED HYPOTHYROIDISM: ICD-10-CM

## 2018-01-10 DIAGNOSIS — R30.0 DYSURIA: ICD-10-CM

## 2018-01-10 DIAGNOSIS — R82.90 NONSPECIFIC FINDING ON EXAMINATION OF URINE: ICD-10-CM

## 2018-01-10 DIAGNOSIS — N30.00 ACUTE CYSTITIS WITHOUT HEMATURIA: Primary | ICD-10-CM

## 2018-01-10 DIAGNOSIS — R11.0 NAUSEA: ICD-10-CM

## 2018-01-10 DIAGNOSIS — R10.13 DYSPEPSIA: ICD-10-CM

## 2018-01-10 DIAGNOSIS — E04.1 THYROID NODULE: ICD-10-CM

## 2018-01-10 LAB
ALBUMIN UR-MCNC: NEGATIVE MG/DL
APPEARANCE UR: CLEAR
BACTERIA #/AREA URNS HPF: ABNORMAL /HPF
BILIRUB UR QL STRIP: NEGATIVE
COLOR UR AUTO: YELLOW
GLUCOSE UR STRIP-MCNC: NEGATIVE MG/DL
HGB UR QL STRIP: NEGATIVE
KETONES UR STRIP-MCNC: NEGATIVE MG/DL
LEUKOCYTE ESTERASE UR QL STRIP: ABNORMAL
NITRATE UR QL: POSITIVE
NON-SQ EPI CELLS #/AREA URNS LPF: ABNORMAL /LPF
PH UR STRIP: 6 PH (ref 5–7)
RBC #/AREA URNS AUTO: ABNORMAL /HPF
SOURCE: ABNORMAL
SP GR UR STRIP: 1.02 (ref 1–1.03)
UROBILINOGEN UR STRIP-ACNC: 0.2 EU/DL (ref 0.2–1)
WBC #/AREA URNS AUTO: ABNORMAL /HPF

## 2018-01-10 PROCEDURE — 87086 URINE CULTURE/COLONY COUNT: CPT | Performed by: FAMILY MEDICINE

## 2018-01-10 PROCEDURE — 87088 URINE BACTERIA CULTURE: CPT | Performed by: FAMILY MEDICINE

## 2018-01-10 PROCEDURE — 99214 OFFICE O/P EST MOD 30 MIN: CPT | Performed by: FAMILY MEDICINE

## 2018-01-10 PROCEDURE — 81001 URINALYSIS AUTO W/SCOPE: CPT | Performed by: FAMILY MEDICINE

## 2018-01-10 PROCEDURE — 87186 SC STD MICRODIL/AGAR DIL: CPT | Performed by: FAMILY MEDICINE

## 2018-01-10 RX ORDER — CIPROFLOXACIN 500 MG/1
500 TABLET, FILM COATED ORAL 2 TIMES DAILY
Qty: 14 TABLET | Refills: 0 | Status: SHIPPED | OUTPATIENT
Start: 2018-01-10 | End: 2018-06-12

## 2018-01-10 RX ORDER — LEVOTHYROXINE SODIUM 88 UG/1
88 TABLET ORAL DAILY
Qty: 90 TABLET | Refills: 0 | Status: SHIPPED | OUTPATIENT
Start: 2018-01-10 | End: 2018-04-02

## 2018-01-10 NOTE — NURSING NOTE
"Chief Complaint   Patient presents with     Results     review labs,      Dysuria       Initial /80 (BP Location: Right arm, Patient Position: Chair, Cuff Size: Adult Regular)  Pulse 62  Temp 98.4  F (36.9  C) (Oral)  Resp 14  Ht 5' 4\" (1.626 m)  Wt 144 lb (65.3 kg)  LMP 12/19/2013  BMI 24.72 kg/m2 Estimated body mass index is 24.72 kg/(m^2) as calculated from the following:    Height as of this encounter: 5' 4\" (1.626 m).    Weight as of this encounter: 144 lb (65.3 kg).  Medication Reconciliation: complete      Health Maintenance addressed:  NONE    N/a  .Vero DELACRUZ MA        "

## 2018-01-10 NOTE — PROGRESS NOTES
SUBJECTIVE:   Lynn Thompson is a 52 year old female who presents to clinic today for the following health issues:      URINARY TRACT SYMPTOMS  Onset: 1week    Description:   Painful urination (Dysuria): YES  Blood in urine (Hematuria): no   Delay in urine (Hesitency): YES    Intensity: moderate    Progression of Symptoms:  worsening    Accompanying Signs & Symptoms:  Fever/chills: no   Flank pain no   Nausea and vomiting: YES  Any vaginal symptoms: none  Abdominal/Pelvic Pain: YES- fullness     History:   History of frequent UTI's: YES  History of kidney stones: YES  Sexually Active: YES  Possibility of pregnancy: No    Precipitating factors:   none    Therapies Tried and outcome: Azo     Also would like to review abnormal labs drawn on 1/4/2018. TSH levels were high, currently taking 75 mcg synthroid. No overt hypothyroid symptoms.     Continues to have nausea and epigastric pain. Post prandial. Taking zofran and ranitidine. Noted some improvement in epigastric pain following start of ranitidine but not enough.         Problem list and histories reviewed & adjusted, as indicated.  Additional history: none    Patient Active Problem List   Diagnosis     Iron deficiency anemia secondary to inadequate dietary iron intake     Gastric bypass status for obesity     Health Care Home     Chronic pain syndrome     Exocrine pancreatic insufficiency     Asplenia     BLU (obstructive sleep apnea)     S/P exploratory laparotomy     Dysthymia     Anxiety     Thyroid nodule     Acquired hypothyroidism     Post-pancreatectomy diabetes (H)     E coli bacteremia     Antibiotic-associated diarrhea     Elevated LFTs     Past Surgical History:   Procedure Laterality Date     APPENDECTOMY  1990     C NONSPECIFIC PROCEDURE  12/98    R bunion     C NONSPECIFIC PROCEDURE  1997    T & A     C NONSPECIFIC PROCEDURE  1992    partial ileum resection     C NONSPECIFIC PROCEDURE  1988    R ovarian cyst     C NONSPECIFIC PROCEDURE  1989          C NONSPECIFIC PROCEDURE      GALL BLADDER     C NONSPECIFIC PROCEDURE      CBD stent; Dr. Fernandez MAY NONSPECIFIC PROCEDURE   &     colonoscopy     C NONSPECIFIC PROCEDURE      Gastric bypass     CHOLECYSTECTOMY       COLONOSCOPY       COMBINED CYSTOSCOPY, RETROGRADES, URETEROSCOPY, LASER HOLMIUM LITHOTRIPSY URETER(S), INSERT STENT Right 3/23/2015    Procedure: COMBINED CYSTOSCOPY, RETROGRADES, URETEROSCOPY, LASER HOLMIUM LITHOTRIPSY URETER(S), INSERT STENT;  Surgeon: Kennedi Aldana MD;  [facility]     COMBINED CYSTOSCOPY, RETROGRADES, URETEROSCOPY, LASER HOLMIUM LITHOTRIPSY URETER(S), INSERT STENT Right 2015    Procedure: COMBINED CYSTOSCOPY, RETROGRADES, URETEROSCOPY, LASER HOLMIUM LITHOTRIPSY URETER(S), INSERT STENT;  Surgeon: Kennedi Aldana MD;  [facility] OR     COSMETIC SURGERY  2002    Tummy tuck     CYSTOSCOPY, RETROGRADES, INSERT STENT URETER(S), COMBINED  10/2/2012    Procedure: COMBINED CYSTOSCOPY, RETROGRADES, INSERT STENT URETER(S);  COMBINED CYSTOSCOPY,  , INSERT LEFT STENT URETER;  Surgeon: Johny Baez MD;  Location: [facility] OR     ENT SURGERY       EXTRACORPOREAL SHOCK WAVE LITHOTRIPSY (ESWL)  10/16/2012    Procedure: EXTRACORPOREAL SHOCK WAVE LITHOTRIPSY (ESWL);  left EXTRACORPOREAL SHOCK WAVE LITHOTRIPSY (ESWL) ;  Surgeon: Johny Baez MD;  Location: [facility] OR     GI SURGERY  2015    Small bowel obstruction     HC LAP,LYSIS OF ADHESIONS       HERNIA REPAIR  2015     LAPAROSCOPIC LYSIS ADHESIONS N/A 2015    Procedure: LAPAROSCOPIC LYSIS ADHESIONS;  Surgeon: Aaron Early MD;  [facility]     LAPAROSCOPIC LYSIS ADHESIONS N/A 2015    Procedure: LAPAROSCOPIC LYSIS ADHESIONS;  Surgeon: Aaron Early MD;  [facility]     PANCREATECTOMY, TRANSPLANT AUTO ISLET CELL, COMBINED  5/10/2013    Procedure: COMBINED PANCREATECTOMY, TRANSPLANT AUTO ISLET CELL;  Pancreatectomy, Auto Islet Cell Transplant    "hernia repair, jejunostomy tube and liver biopsies with Anesthesia General with block;  Surgeon: Aaron Early MD;  [facility]     TRANSPLANT  5/10/13       Social History   Substance Use Topics     Smoking status: Never Smoker     Smokeless tobacco: Never Used     Alcohol use No     Family History   Problem Relation Age of Onset     Family History Negative Mother      Respiratory Father      COPD;  at 69     Genitourinary Problems Father      kidney stones     Substance Abuse Father      Depression Father      Asthma Father      HEART DISEASE Paternal Grandfather      M.I.     Coronary Artery Disease Paternal Grandfather      Hyperlipidemia Paternal Grandfather      Genitourinary Problems Brother      multiple brothers with kidney stones     GASTROINTESTINAL DISEASE Maternal Grandmother      undiagnosed 'gut' issues     Coronary Artery Disease Maternal Grandfather      Hyperlipidemia Maternal Grandfather      CEREBROVASCULAR DISEASE Paternal Grandmother      At the age of 103     Anxiety Disorder Paternal Grandmother      OSTEOPOROSIS Paternal Grandmother      Anxiety Disorder Son      Anxiety Disorder Daughter      Asthma Daughter              Reviewed and updated as needed this visit by clinical staff     Reviewed and updated as needed this visit by Provider         ROS:  Constitutional, HEENT, cardiovascular, pulmonary, gi and gu systems are negative, except as otherwise noted.      OBJECTIVE:   /80 (BP Location: Right arm, Patient Position: Chair, Cuff Size: Adult Regular)  Pulse 62  Temp 98.4  F (36.9  C) (Oral)  Resp 14  Ht 5' 4\" (1.626 m)  Wt 144 lb (65.3 kg)  LMP 2013  BMI 24.72 kg/m2  Body mass index is 24.72 kg/(m^2).  GENERAL: healthy, alert and no distress  NECK: no adenopathy, no asymmetry, masses, or scars and thyroid normal to palpation  RESP: lungs clear to auscultation - no rales, rhonchi or wheezes  CV: regular rate and rhythm, normal S1 S2, no S3 or S4, no murmur, " click or rub, no peripheral edema and peripheral pulses strong  ABDOMEN: soft, nontender, no hepatosplenomegaly, no masses and bowel sounds normal  MS: no gross musculoskeletal defects noted, no edema    Diagnostic Test Results:  Results for orders placed or performed in visit on 01/10/18   *UA reflex to Microscopic and Culture ([facility] (except [place] and [place])   Result Value Ref Range    Color Urine Yellow     Appearance Urine Clear     Glucose Urine Negative NEG^Negative mg/dL    Bilirubin Urine Negative NEG^Negative    Ketones Urine Negative NEG^Negative mg/dL    Specific Gravity Urine 1.025 1.003 - 1.035    Blood Urine Negative NEG^Negative    pH Urine 6.0 5.0 - 7.0 pH    Protein Albumin Urine Negative NEG^Negative mg/dL    Urobilinogen Urine 0.2 0.2 - 1.0 EU/dL    Nitrite Urine Positive (A) NEG^Negative    Leukocyte Esterase Urine Small (A) NEG^Negative    Source Midstream Urine    Urine Microscopic   Result Value Ref Range    WBC Urine 25-50 (A) OTO2^O - 2 /HPF    RBC Urine O - 2 OTO2^O - 2 /HPF    Squamous Epithelial /LPF Urine Moderate (A) FEW^Few /LPF    Bacteria Urine Moderate (A) NEG^Negative /HPF     *Note: Due to a large number of results and/or encounters for the requested time period, some results have not been displayed. A complete set of results can be found in Results Review.       ASSESSMENT/PLAN:     1. Acute cystitis without hematuria - will treat with abx, await culture results. ABx chosen based on previous culture/sensitivity  - ciprofloxacin (CIPRO) 500 MG tablet; Take 1 tablet (500 mg) by mouth 2 times daily  Dispense: 14 tablet; Refill: 0    2. Dysuria  - *UA reflex to Microscopic and Culture ([facility] (except [place] and [place])  - Urine Microscopic    3. Nonspecific finding on examination of urine  - Urine Culture Aerobic Bacterial    4. Acquired hypothyroidism - recently elevated TSH, low T4, will increase synthroid dose, recheck levels  in 8 weeks  - levothyroxine (SYNTHROID/LEVOTHROID) 88 MCG tablet; Take 1 tablet (88 mcg) by mouth daily  Dispense: 90 tablet; Refill: 0  - **TSH with free T4 reflex FUTURE 2mo; Future    5. Thyroid nodule - previously complex cystic 1 cm nodule in the right lobe of the thyroid, will recheck  - US Thyroid; Future    6. Dyspepsia - discussed EGD before pursuing PPI, r/o gastric ulcer  - GASTROENTEROLOGY ADULT REF PROCEDURE ONLY    Maryana Brooks MD  [facility]

## 2018-01-10 NOTE — TELEPHONE ENCOUNTER
Prescription approved per [facility] [place] Refill Protocol.  Sarika Osullivan RN

## 2018-01-10 NOTE — MR AVS SNAPSHOT
After Visit Summary   1/10/2018    Lynn Thompson    MRN: [id number]           Patient Information     Date Of Birth          1965        Visit Information        Provider Department      1/10/2018 11:00 AM Maryana Brooks MD [facility]        Today's Diagnoses     Acute cystitis without hematuria    -  1    Dysuria        Nonspecific finding on examination of urine        Acquired hypothyroidism        Thyroid nodule        Dyspepsia           Follow-ups after your visit        Additional Services     GASTROENTEROLOGY ADULT REF PROCEDURE ONLY       Last Lab Result: Creatinine (mg/dL)       Date                     Value                 01/04/2018               0.69             ----------  Body mass index is 24.72 kg/(m^2).     Needed:  No  Language:  English    Patient will be contacted to schedule procedure.     Please be aware that coverage of these services is subject to the terms and limitations of your health insurance plan.  Call member services at your health plan with any benefit or coverage questions.  Any procedures must be performed at a [place] facility OR coordinated by your clinic's referral office.    Please bring the following with you to your appointment:    (1) Any X-Rays, CTs or MRIs which have been performed.  Contact the facility where they were done to arrange for  prior to your scheduled appointment.    (2) List of current medications   (3) This referral request   (4) Any documents/labs given to you for this referral                  Your next 10 appointments already scheduled     Jun 07, 2018 10:00 AM CDT   (Arrive by 9:45 AM)   Return Auto [place]let with Adriana Robles MD   [facility] ([facility])    [address] [place] [address]  [address]  [facility] [address] [phone]              Future tests that were ordered for you today     Open Future Orders        Priority Expected Expires  "Ordered    US Thyroid Routine  1/10/2019 1/10/2018    **TSH with free T4 reflex FUTURE 2mo Routine 3/11/2018 5/10/2018 1/10/2018            Who to contact     If you have questions or need follow up information about today's clinic visit or your schedule please contact [facility] directly at [phone].  Normal or non-critical lab and imaging results will be communicated to you by MyChart, letter or phone within 4 business days after the clinic has received the results. If you do not hear from us within 7 days, please contact the clinic through [employer] or phone. If you have a critical or abnormal lab result, we will notify you by phone as soon as possible.  Submit refill requests through [facility] or call your pharmacy and they will forward the refill request to us. Please allow 3 business days for your refill to be completed.          Additional Information About Your Visit        [employer] Information     [facility] gives you secure access to your electronic health record. If you see a primary care provider, you can also send messages to your care team and make appointments. If you have questions, please call your primary care clinic.  If you do not have a primary care provider, please call [phone] and they will assist you.        Care EveryWhere ID     This is your Care EveryWhere ID. This could be used by other organizations to access your [facility] medical records  [id number]        Your Vitals Were     Pulse Temperature Respirations Height Last Period BMI (Body Mass Index)    62 98.4  F (36.9  C) (Oral) 14 5' 4\" (1.626 m) 12/19/2013 24.72 kg/m2       Blood Pressure from Last 3 Encounters:   01/10/18 110/80   12/07/17 158/90   11/17/17 110/78    Weight from Last 3 Encounters:   01/10/18 144 lb (65.3 kg)   12/07/17 145 lb 11.2 oz (66.1 kg)   11/17/17 143 lb (64.9 kg)              We Performed the Following     *UA reflex to Microscopic and Culture ([place] [facility] (except [place] " and [place])     GASTROENTEROLOGY ADULT REF PROCEDURE ONLY     Urine Culture Aerobic Bacterial     Urine Microscopic          Today's Medication Changes          These changes are accurate as of: 1/10/18  1:43 PM.  If you have any questions, ask your nurse or doctor.               Start taking these medicines.        Dose/Directions    ciprofloxacin 500 MG tablet   Commonly known as:  CIPRO   Used for:  Acute cystitis without hematuria   Started by:  Maryana Brooks MD        Dose:  500 mg   Take 1 tablet (500 mg) by mouth 2 times daily   Quantity:  14 tablet   Refills:  0         These medicines have changed or have updated prescriptions.        Dose/Directions    insulin glargine 100 UNIT/ML injection   Commonly known as:  LANTUS SOLOSTAR   This may have changed:  when to take this   Used for:  Post-pancreatectomy diabetes (H)        Dose:  4 Units   Inject 4 Units Subcutaneous every 24 hours   Quantity:  15 mL   Refills:  1       * levothyroxine 75 MCG tablet   Commonly known as:  SYNTHROID/LEVOTHROID   This may have changed:  Another medication with the same name was added. Make sure you understand how and when to take each.   Used for:  Acquired hypothyroidism   Changed by:  Maryana Brooks MD        Dose:  75 mcg   Take 1 tablet (75 mcg) by mouth daily   Quantity:  90 tablet   Refills:  1       * levothyroxine 88 MCG tablet   Commonly known as:  SYNTHROID/LEVOTHROID   This may have changed:  You were already taking a medication with the same name, and this prescription was added. Make sure you understand how and when to take each.   Used for:  Acquired hypothyroidism   Changed by:  Maryana Brooks MD        Dose:  88 mcg   Take 1 tablet (88 mcg) by mouth daily   Quantity:  90 tablet   Refills:  0       * Notice:  This list has 2 medication(s) that are the same as other medications prescribed for you. Read the directions carefully, and ask your doctor or other care provider to review them with  you.         Where to get your medicines      These medications were sent to [id number] - [place], [place] - [address]  [address], [facility] [address]     Phone:  [phone]     ciprofloxacin 500 MG tablet    levothyroxine 88 MCG tablet    ranitidine 150 MG tablet                Primary Care Provider Office Phone # Fax #    Maryana Sivakumar Brooks [phone] [address]  [facility] [address]        Equal Access to Services     CHAD SOLAON : Hadii aad ku hadasho Soomaali, waaxda luqadaha, qaybta kaalmada adeegyada, waxay idiin hayaan adeeg kharanathalie main . So [facility] [place] [phone].    ATENCIÓN: Si habla español, tiene a rivera disposición servicios gratuitos de asistencia lingüística. [facility] [phone].    We comply with applicable federal civil rights laws and [place] laws. We do not discriminate on the basis of race, color, national origin, age, disability, sex, sexual orientation, or gender identity.            Thank you!     Thank you for choosing [facility]  for your care. Our goal is always to provide you with excellent care. Hearing back from our patients is one way we can continue to improve our services. Please take a few minutes to complete the written survey that you may receive in the mail after your visit with us. Thank you!             Your Updated Medication List - Protect others around you: Learn how to safely use, store and throw away your medicines at www.disposemymeds.org.          This list is accurate as of: 1/10/18  1:43 PM.  Always use your most recent med list.                   Brand Name Dispense Instructions for use Diagnosis    ACETAMINOPHEN PO      Take 325 mg by mouth every 6 hours as needed for pain        amylase-lipase-protease 58928 UNITS Tabs tablet    VIOKACE    600 tablet    4-5 aps with meals and 1-2 with snacks    Pancreatic insufficiency       blood glucose lancing device     1 each    Use to test blood sugars 6  times daily or as directed.    Absence of pancreas, acquired       blood glucose monitoring test strip    BILL CONTOUR NEXT    2 Box    Check BS 4-6 times a day    Absence of pancreas, acquired       * buprenorphine HCl-naloxone HCl 2-0.5 MG per film    SUBOXONE     Place 0.5 Film under the tongue every morning        * buprenorphine HCl-naloxone HCl 2-0.5 MG per film    SUBOXONE     Place 0.25 Film under the tongue every evening        busPIRone 15 MG tablet    BUSPAR    120 tablet    Increased dose. 30 mg two times per day.    Anxiety       ciprofloxacin 500 MG tablet    CIPRO    14 tablet    Take 1 tablet (500 mg) by mouth 2 times daily    Acute cystitis without hematuria       CLONIDINE HCL PO      Take 0.1 mg by mouth 2 times daily as needed        DOCUSATE SODIUM PO      Take 100 mg by mouth daily        DULoxetine 30 MG EC capsule    CYMBALTA    180 capsule    Take 1 capsule (30 mg) by mouth 2 times daily Fill at patient request.    Anxiety       ESTRACE VAGINAL 0.1 MG/GM cream   Generic drug:  estradiol     42.5 g    PLACE 2 GRAMS VAGINALLY TWICE A WEEK    Vaginal atrophy       Estradiol-Norethindrone Acet 0.5-0.1 MG Tabs     84 tablet    TAKE ONE TABLET BY MOUTH EVERY DAY    Decreased libido       GABAPENTIN PO      Take 100 mg by mouth 3 times daily        glucose 40 % Gel gel     1 Tube    Take 15-30 g by mouth every 15 minutes as needed.    Chronic pancreatitis (H)       insulin glargine 100 UNIT/ML injection    LANTUS SOLOSTAR    15 mL    Inject 4 Units Subcutaneous every 24 hours    Post-pancreatectomy diabetes (H)       insulin pen needle 32G X 4 MM    BD MAHESH U/F    100 each    Use up to 3 times daily as needed for insulin dosing    Acquired absence of pancreas       * levothyroxine 75 MCG tablet    SYNTHROID/LEVOTHROID    90 tablet    Take 1 tablet (75 mcg) by mouth daily    Acquired hypothyroidism       * levothyroxine 88 MCG tablet    SYNTHROID/LEVOTHROID    90 tablet    Take 1 tablet (88 mcg) by  mouth daily    Acquired hypothyroidism       loratadine 10 MG tablet    CLARITIN     Take 10 mg by mouth daily as needed        LORazepam 0.5 MG tablet    ATIVAN    30 tablet    Take 1 tablet (0.5 mg) by mouth every 8 hours as needed for anxiety    Anxiety       modafinil 200 MG tablet    PROVIGIL     Take 2 tablets by mouth Once a Day        MOVANTIK 25 MG Tabs tablet   Generic drug:  naloxegol      Take 25 mg by mouth every morning (before breakfast)        naloxone nasal spray    NARCAN     Spray 4 mg in nostril as needed for opioid reversal        NovoLOG penFILL 100 UNITS/ML injection   Generic drug:  insulin aspart     3 Month    Take as directed    Diabetes mellitus type 1 (H)       NOVOPEN JR (GREEN) Genny   Generic drug:  Injection Device for insulin      1 Device Inject 1 unit (which is marked as a 1/2 unit on the pen itself)  as needed when eating carb heavy meals.        omeprazole 40 MG capsule    priLOSEC    180 capsule    Take 1 capsule (40 mg) by mouth 2 times daily Take 30-60 minutes before a meal.    Gastroesophageal reflux disease without esophagitis, Nausea, Gastric bypass status for obesity       ondansetron 4 MG ODT tab    ZOFRAN ODT    20 tablet    Take 1-2 tablets (4-8 mg) by mouth 3 times daily (before meals)    Nausea       polyethylene glycol Packet    MIRALAX/GLYCOLAX    7 packet    Take 17 g by mouth daily    Constipation       ranitidine 150 MG tablet    ZANTAC    60 tablet    Take 1 tablet (150 mg) by mouth 2 times daily    Nausea       sennosides 8.6 MG tablet    SENOKOT    120 tablet    Take 2 tablets by mouth 2 times daily    Other constipation       Sharps Container Misc     1 each    For insulin needles    Absence of pancreas, acquired       TRAZODONE HCL PO      Take 50 mg by mouth nightly as needed for sleep        * Notice:  This list has 4 medication(s) that are the same as other medications prescribed for you. Read the directions carefully, and ask your doctor or other care  provider to review them with you.

## 2018-01-10 NOTE — TELEPHONE ENCOUNTER
"Requested Prescriptions   Pending Prescriptions Disp Refills     ranitidine (ZANTAC) 150 MG tablet 60 tablet 0    Last Written Prescription Date:  12/08/2017  Last Fill Quantity: 60 tablet,  # refills: 0   Last Office Visit with G, P or [facility] prescribing provider:  12/08/2017   Future Office Visit:    Next 5 appointments (look out 90 days)     Bar 10, 2018 11:00 AM CST   MyChart Long with Maryana Brooks MD   [facility] ([facility]    [id number] [place] [address] [phone]                  Sig: Take 1 tablet (150 mg) by mouth 2 times daily    H2 Blockers Protocol Passed    1/10/2018 10:51 AM       Passed - Patient is age 12 or older       Passed - Recent or future visit with authorizing provider's specialty    Patient had office visit in the last year or has a visit in the next 30 days with authorizing provider.  See \"Patient Info\" tab in inbasket, or \"Choose Columns\" in Meds & Orders section of the refill encounter.                 Edy Avilez XRT  "

## 2018-01-12 DIAGNOSIS — G47.00 PERSISTENT INSOMNIA: ICD-10-CM

## 2018-01-12 LAB
BACTERIA SPEC CULT: ABNORMAL
BACTERIA SPEC CULT: ABNORMAL
SPECIMEN SOURCE: ABNORMAL

## 2018-01-12 RX ORDER — TRAZODONE HYDROCHLORIDE 50 MG/1
TABLET, FILM COATED ORAL
Qty: 90 TABLET | Refills: 0 | OUTPATIENT
Start: 2018-01-12

## 2018-01-12 RX ORDER — TRAZODONE HYDROCHLORIDE 50 MG/1
50 TABLET, FILM COATED ORAL AT BEDTIME
Qty: 90 TABLET | Refills: 3 | Status: SHIPPED | OUTPATIENT
Start: 2018-01-12 | End: 2019-02-20

## 2018-01-12 NOTE — TELEPHONE ENCOUNTER
TRAZODONE HCL PO      Last Written Prescription Date:  Historical  Last Fill Quantity: -,   # refills: -  Last Office Visit: 5/25/17  Future Office visit:   No future office visit scheduled    Routing refill request to provider for review/approval because:  No plan for follow up.        Treatment Plan:    Continue Buspar (buspirone) 30 mg by mouth in AM and 30 mg in PM.    Continue Cymbalta (duloxetine) 30 mg by mouth in AM and 30 mg in PM.    Continue trazodone 50 mg by mouth at bedtime for sleep.    Continue Neurontin (gabapentin) 200 mg by mouth 3 times per day per pain specialist.    Continue Provigil (modafinil) 400 mg by mouth daily in AM per sleep specialist.    Continue Suboxone per Addiction Medicine specialist.    Continue all other medications as reviewed per electronic medical record today.     All questions addressed. Education and counseling completed regarding risks and benefits of medications and psychotherapy options.    Safety plan was reviewed. To the Emergency Department as needed or call after hours crisis line at [phone] or [phone].     Continue individual therapy as planned with Dr. Rina Watt.    Schedule an appointment with me in as needed. Call [facility] at [phone] to schedule.    Follow up with primary care provider as planned or for acute medical concerns.    Call the psychiatric nurse line with medication questions or concerns at [phone].    My Practice Policy was reviewed and signed: YES     MyChart may be used to communicate with your provider, but this is not intended to be used for emergencies.      Adelfo Vinson RN

## 2018-01-12 NOTE — TELEPHONE ENCOUNTER
Spoke with pt and she is taking this medication at night    Pt takes 50 mg every night     Neel Zarate RN, BSN

## 2018-01-12 NOTE — TELEPHONE ENCOUNTER
Returned care to Primary Care Provider in May.   Please advise if this should be restarted and RX per Primary Care Provider.

## 2018-01-17 ENCOUNTER — HOSPITAL ENCOUNTER (OUTPATIENT)
Dept: ULTRASOUND IMAGING | Facility: CLINIC | Age: 53
Discharge: HOME | End: 2018-01-17
Attending: FAMILY MEDICINE | Admitting: FAMILY MEDICINE
Payer: COMMERCIAL

## 2018-01-17 DIAGNOSIS — E04.1 THYROID NODULE: ICD-10-CM

## 2018-01-17 PROCEDURE — 76536 US EXAM OF HEAD AND NECK: CPT

## 2018-01-19 DIAGNOSIS — E04.1 THYROID NODULE: Primary | ICD-10-CM

## 2018-01-24 ENCOUNTER — HOSPITAL ENCOUNTER (OUTPATIENT)
Facility: CLINIC | Age: 53
Discharge: HOME | End: 2018-01-24
Attending: INTERNAL MEDICINE | Admitting: INTERNAL MEDICINE
Payer: COMMERCIAL

## 2018-01-24 ENCOUNTER — SURGERY (OUTPATIENT)
Age: 53
End: 2018-01-24

## 2018-01-24 VITALS
RESPIRATION RATE: 16 BRPM | SYSTOLIC BLOOD PRESSURE: 99 MMHG | DIASTOLIC BLOOD PRESSURE: 57 MMHG | OXYGEN SATURATION: 96 %

## 2018-01-24 LAB
GLUCOSE BLDC GLUCOMTR-MCNC: 103 MG/DL (ref 70–99)
UPPER GI ENDOSCOPY: NORMAL

## 2018-01-24 PROCEDURE — 82962 GLUCOSE BLOOD TEST: CPT

## 2018-01-24 PROCEDURE — 25000128 H RX IP 250 OP 636: Performed by: INTERNAL MEDICINE

## 2018-01-24 PROCEDURE — 43235 EGD DIAGNOSTIC BRUSH WASH: CPT | Performed by: INTERNAL MEDICINE

## 2018-01-24 PROCEDURE — 25000125 ZZHC RX 250: Performed by: INTERNAL MEDICINE

## 2018-01-24 PROCEDURE — 40000104 ZZH STATISTIC MODERATE SEDATION < 10 MIN: Performed by: INTERNAL MEDICINE

## 2018-01-24 RX ORDER — ONDANSETRON 2 MG/ML
4 INJECTION INTRAMUSCULAR; INTRAVENOUS
Status: DISCONTINUED | OUTPATIENT
Start: 2018-01-24 | End: 2018-01-24 | Stop reason: HOSPADM

## 2018-01-24 RX ORDER — FLUMAZENIL 0.1 MG/ML
0.2 INJECTION, SOLUTION INTRAVENOUS
Status: DISCONTINUED | OUTPATIENT
Start: 2018-01-24 | End: 2018-01-24 | Stop reason: HOSPADM

## 2018-01-24 RX ORDER — NALOXONE HYDROCHLORIDE 0.4 MG/ML
.1-.4 INJECTION, SOLUTION INTRAMUSCULAR; INTRAVENOUS; SUBCUTANEOUS
Status: DISCONTINUED | OUTPATIENT
Start: 2018-01-24 | End: 2018-01-24 | Stop reason: HOSPADM

## 2018-01-24 RX ORDER — FENTANYL CITRATE 50 UG/ML
INJECTION, SOLUTION INTRAMUSCULAR; INTRAVENOUS PRN
Status: DISCONTINUED | OUTPATIENT
Start: 2018-01-24 | End: 2018-01-24 | Stop reason: HOSPADM

## 2018-01-24 RX ORDER — ONDANSETRON 2 MG/ML
4 INJECTION INTRAMUSCULAR; INTRAVENOUS EVERY 6 HOURS PRN
Status: DISCONTINUED | OUTPATIENT
Start: 2018-01-24 | End: 2018-01-24 | Stop reason: HOSPADM

## 2018-01-24 RX ORDER — LIDOCAINE 40 MG/G
CREAM TOPICAL
Status: DISCONTINUED | OUTPATIENT
Start: 2018-01-24 | End: 2018-01-24 | Stop reason: HOSPADM

## 2018-01-24 RX ORDER — ONDANSETRON 4 MG/1
4 TABLET, ORALLY DISINTEGRATING ORAL EVERY 6 HOURS PRN
Status: DISCONTINUED | OUTPATIENT
Start: 2018-01-24 | End: 2018-01-24 | Stop reason: HOSPADM

## 2018-01-24 RX ADMIN — FENTANYL CITRATE 100 MCG: 50 INJECTION, SOLUTION INTRAMUSCULAR; INTRAVENOUS at 10:05

## 2018-01-24 RX ADMIN — BENZOCAINE 1 APPLICATOR: 220 SPRAY, METERED PERIODONTAL at 10:05

## 2018-01-24 RX ADMIN — MIDAZOLAM 2 MG: 1 INJECTION INTRAMUSCULAR; INTRAVENOUS at 10:05

## 2018-01-24 NOTE — LETTER
January 12, 2018      Lynn Thompson  [address] [facility] [address]        Dear Lynn,       Thank you for choosing [facility]. You are scheduled for the following service.   Date:   1-24-18      Procedure: UPPER ENDOSCOPY-EGD  Doctor: Vishal           Arrival Time:  0915    *check in at Emergency/Endoscopy desk*  Procedure Time: 0945     Location:   [facility]        Endoscopy Department, First Floor (Enter through ER Doors) *         [address] [place], [place] [address]      [phone] or [phone] () to reschedule        PRE-PROCEDURE CHECKLIST    If you have diabetes, ask your regular doctor for diet and medication restrictions.  If you take any antiplatelet or anticoagulant medications (such as Coumadin, Lovenox, Plavix, etc.) and have not already discussed this, please call your primary physician for advice on holding this medication.  If you take Aspirin, you may continue to do so.  If you are or may be pregnant, please discuss the risks and benefits of this procedure with your doctor.  You must arrange for a ride for the day of your exam. If you fail to arrange transportation with a responsible adult, your procedure will need to be cancelled and rescheduled. Taxi, bus and medical transport are not acceptable unless you have a responsible adult that you know & trust with you. Please arrange for this  to be able to pick you up in our department, approximately one hour after your scheduled procedure, if they are not able to stay with you.      Canceling or rescheduling   If you must cancel or reschedule your appointment, please call [phone] as soon as possible.      Upper Endoscopy or Esophagogastroduodenoscopy (EGD) is a test performed to evaluate symptoms of persistent abdominal pain, nausea, vomiting and difficulty swallowing. It may also be used to treat various conditions of the upper gastrointestinal  tract, such as bleeding, narrowing or abnormal growths.     What happens during an upper endoscopy?  On the day of your procedure, plan to spend up to one and a half hour after your arrival at the endoscopy center. The exam itself takes about 5 to 10 minutes.    Before the exam:  - You will change into a gown.   - Your medical history and medication list will be reviewed with you, unless it has already been done over the phone.   - A nurse will insert an intravenous (IV) line into your hand or arm.  - The doctor will talk to you and give you a consent form to sign.    During the exam:  - Medicine will be given through the IV line to help you relax and feel comfortable.   - Your heart rate and oxygen levels will be monitored. If your blood pressure is low, you may be given fluids through the IV line.   - The doctor will insert a flexible, hollow tube, called an endoscope, into your mouth and will advance it slowly through the esophagus, stomach and duodenum (the first part of your small intestine).   - You may have a feeling of pressure or fullness.   - If you have difficulty swallowing, and the doctor finds a narrowing in your esophagus, it may be possible for the area to be expanded-dilated during the exam.   - If abnormal tissue is found, the doctor may remove it through the endoscope (biopsy it) for closer examination. The tissue removal is painless.    After the exam:  - Any tissue samples removed during the exam will be sent to a lab for evaluation. It may take 5 to 7 working days for you to be notified of the results  - The doctor will prepare a full report for the physician who referred you for the upper endoscopy.   - The doctor will talk with you about the initial results of your exam.   - You may feel bloated after the procedure. That is normal and should not last long.   - Your throat may feel sore for a short time.   - Following the exam, you may resume your normal diet. Avoid alcohol until the next day.    - You may resume your regular activities the day after the procedure.   - Medication given during the exam will prohibit you from driving for the rest of the day.  - A nurse will provide you with complete discharge instructions before you leave the endoscopy center. Be sure to ask the nurse for specific instructions if you take blood thinners such as Aspirin , Coumadin , Lovenox , Plavix , etc.       PREPARATION    To ensure a successful exam, please follow all instructions carefully.      The night before your exam:    STOP eating solid foods at 11:45 pm.     Clear liquids are okay to drink (examples: Gatorade , apple juice, clear broth, etc.).     DO NOT drink red liquids or alcoholic beverages.    The day of your exam:    STOP drinking clear liquids 4 hours before your exam.     You may take your usual medications with 4 oz. of water, but it needs to be at least 4 hours prior to your procedure.    When you leave for the procedure:    Bring a list of all of your current medications, including any allergy or over-the-counter medications, unless you have already reviewed that with an Endoscopy RN over the phone.     Bring a photo ID as well as up-to-date insurance information, such as your insurance card and any referral forms that might be required by your payer.         DIRECTIONS TO THE ENDOSCOPY DEPARTMENT    From the north ([facility])  Take 35W south, exit on [address] . Get into the left hand sebastian, turn left (east), go one-half mile to [address] and turn left. Go north to the first stoplight, take a right on [address] and follow it to the Emergency entrance.    From the south ([facility])  Take 35N to the 35E split and exit on [address] [address]. On [address] , turn left (west) to [address]. Turn right (north) on [address]. Go north to the first stoplight, take a right on [address] and follow it to the Emergency entrance.    From the east  via 35E ([facility])  Take [address]E south to [address]  exit. Turn right on [address]. Go west to [address]. Turn right (north) on [address]. Go to the first stoplight, take a right and follow on [address] to the Emergency entrance.    From the east via [address] ([facility])  Take [address] West to [address]. Turn left (south) on [address] to [address]. Turn left (east) on [address] and follow it to the Emergency entrance.    From the west via Highway 13 ([place], [place])  Take [address] east to [address]. Turn right (south) on [address] to [address]. Turn left (east) on [address] and follow it to the Emergency entrance.

## 2018-01-24 NOTE — CONSULTS
Pre-Endoscopy History and Physical     Lynn Thompson MRN# [id number]   YOB: 1965 Age: 52 year old     Date of Procedure: 1/24/2018  Primary care provider: Maryana Brooks  Type of Endoscopy: esophagogastroduodenoscopy (upper GI endoscopy)  Reason for Procedure: dyspepsia  Type of Anesthesia Anticipated: Moderate (conscious) sedation    HPI:    Lynn is a 52 year old female who will be undergoing the above procedure.      A history and physical has been performed. The patient's medications and allergies have been reviewed. The risks and benefits of the procedure and the sedation options and risks were discussed with the patient.  All questions were answered and informed consent was obtained.      She denies a personal or family history of anesthesia complications or bleeding disorders.     Allergies   Allergen Reactions     Povidone Iodine Hives     Causes skin to blister     Corticosteroids Other (See Comments)     All oral,IV and injectable steroids are contraindicated (unless in life threatening situations) in [facility] [employer] transplant recipients. They can cause irreversible loss of islet cell function. Please contact patients transplant care coordinator JONATHAN Carvalho RN at /pager   and Endocrinologist prior to administration.      Nsaids      naprosyn = GI upset     Povidone Iodine      blisters     Sulfasalazine Nausea and Nausea and Vomiting        Current Facility-Administered Medications   Medication     lidocaine 1 % 1 mL     lidocaine (LMX4) kit     sodium chloride (PF) 0.9% PF flush 3 mL     sodium chloride (PF) 0.9% PF flush 3 mL     0.9% sodium chloride BOLUS     sodium chloride (PF) 0.9% PF flush 3 mL     ondansetron (ZOFRAN) injection 4 mg       Patient Active Problem List   Diagnosis     Iron deficiency anemia secondary to inadequate dietary iron intake     Gastric bypass status for obesity     Health Care Home     Chronic pain syndrome     Exocrine  pancreatic insufficiency     Asplenia     BLU (obstructive sleep apnea)     S/P exploratory laparotomy     Dysthymia     Anxiety     Thyroid nodule     Acquired hypothyroidism     Post-pancreatectomy diabetes (H)     E coli bacteremia     Antibiotic-associated diarrhea     Elevated LFTs        Past Medical History:   Diagnosis Date     Abdominal pain, epigastric 11/05, ongoing; goes to pain clinic    probable recurrent spasm sphincter of Oddi causing biliary colic pains      Benign paroxysmal positional vertigo     occ.      Calculus of kidney 5/05    x1 on L side passed, several stones.  Has been tested for oxalate.     Chronic abdominal pain 7/17/2013     Chronic pancreatitis (H) 7/17/2013     Depressive disorder, not elsewhere classified     also occ panic spells     Diabetes (H)     post pancreatectomy     Dyspepsia and other specified disorders of function of stomach 6/99    H. 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    ERCP with Stent of CBD by Fernandez @ [facility] with sx relief     Spasm of sphincter of Oddi     surgical + endoscopic stenting of pancreatic duct @ [facility] 5/23/06     Thyroid nodule 11/1/2016     Ureteral calculus 10/2/2012     Vaccination not carried out         Past Surgical History:   Procedure Laterality Date     APPENDECTOMY  1990     C NONSPECIFIC PROCEDURE  12/98    R bunion     C NONSPECIFIC PROCEDURE  1997    T & A     C NONSPECIFIC PROCEDURE  1992    partial ileum resection     C NONSPECIFIC PROCEDURE  1988    R ovarian cyst     C NONSPECIFIC PROCEDURE            C NONSPECIFIC PROCEDURE      GALL BLADDER     C NONSPECIFIC PROCEDURE      CBD stent; Dr. Fernandez MAY NONSPECIFIC PROCEDURE   &     colonoscopy     C NONSPECIFIC PROCEDURE      Gastric bypass     CHOLECYSTECTOMY       COLONOSCOPY       COMBINED CYSTOSCOPY, RETROGRADES, URETEROSCOPY, LASER HOLMIUM LITHOTRIPSY URETER(S), INSERT STENT Right 3/23/2015    Procedure: COMBINED CYSTOSCOPY, RETROGRADES, URETEROSCOPY, LASER HOLMIUM LITHOTRIPSY URETER(S), INSERT STENT;  Surgeon: Kennedi Aldana MD;  Location: [facility]     COMBINED CYSTOSCOPY, RETROGRADES, URETEROSCOPY, LASER HOLMIUM LITHOTRIPSY URETER(S), INSERT STENT Right 2015    Procedure: COMBINED CYSTOSCOPY, RETROGRADES, URETEROSCOPY, LASER HOLMIUM LITHOTRIPSY URETER(S), INSERT STENT;  Surgeon: Kennedi Aldana MD;  Location: [facility] OR     COSMETIC SURGERY  2002    Tummy tuck     CYSTOSCOPY, RETROGRADES, INSERT STENT URETER(S), COMBINED  10/2/2012    Procedure: COMBINED CYSTOSCOPY, RETROGRADES, INSERT STENT URETER(S);  COMBINED CYSTOSCOPY,  , INSERT LEFT STENT URETER;  Surgeon: Johny Baez MD;  Location: [facility] OR     ENT SURGERY       EXTRACORPOREAL SHOCK WAVE LITHOTRIPSY (ESWL)  10/16/2012    Procedure: EXTRACORPOREAL SHOCK WAVE LITHOTRIPSY (ESWL);  left EXTRACORPOREAL SHOCK WAVE LITHOTRIPSY (ESWL) ;  Surgeon: Johny Baez MD;  Location: [facility] OR     GI SURGERY  2015    Small bowel obstruction     HC LAP,LYSIS OF ADHESIONS       HERNIA REPAIR  2015     LAPAROSCOPIC LYSIS ADHESIONS N/A 2015    Procedure: LAPAROSCOPIC LYSIS ADHESIONS;  Surgeon: Aaron Early MD;  [facility]: [facility] OR     LAPAROSCOPIC LYSIS ADHESIONS N/A 2015    Procedure: LAPAROSCOPIC LYSIS ADHESIONS;  Surgeon: Aaron Early MD;  [facility] OR     PANCREATECTOMY, TRANSPLANT AUTO ISLET CELL, COMBINED  5/10/2013    Procedure: COMBINED PANCREATECTOMY, TRANSPLANT AUTO ISLET CELL;  Pancreatectomy, Auto Islet Cell  "Transplant   hernia repair, jejunostomy tube and liver biopsies with Anesthesia General with block;  Surgeon: Aaron Early MD;  Location: [facility] OR     TRANSPLANT  5/10/13       Social History   Substance Use Topics     Smoking status: Never Smoker     Smokeless tobacco: Never Used     Alcohol use No       Family History   Problem Relation Age of Onset     Family History Negative Mother      Respiratory Father      COPD;  at 69     Genitourinary Problems Father      kidney stones     Substance Abuse Father      Depression Father      Asthma Father      HEART DISEASE Paternal Grandfather      M.I.     Coronary Artery Disease Paternal Grandfather      Hyperlipidemia Paternal Grandfather      Genitourinary Problems Brother      multiple brothers with kidney stones     GASTROINTESTINAL DISEASE Maternal Grandmother      undiagnosed 'gut' issues     Coronary Artery Disease Maternal Grandfather      Hyperlipidemia Maternal Grandfather      CEREBROVASCULAR DISEASE Paternal Grandmother      At the age of 103     Anxiety Disorder Paternal Grandmother      OSTEOPOROSIS Paternal Grandmother      Anxiety Disorder Son      Anxiety Disorder Daughter      Asthma Daughter        REVIEW OF SYSTEMS:     5 point ROS negative except as noted above in HPI, including Gen., Resp., CV, GI &  system review.    PHYSICAL EXAM:   [facility] 2013 Estimated body mass index is 24.72 kg/(m^2) as calculated from the following:    Height as of 1/10/18: 1.626 m (5' 4\").    Weight as of 1/10/18: 65.3 kg (144 lb).   GENERAL APPEARANCE: healthy  MENTAL STATUS: alert  AIRWAY EXAM: Mallampatti Class I (visualization of the soft palate, fauces, uvula, anterior and posterior pillars)  RESP: lungs clear to auscultation - no rales, rhonchi or wheezes  CV: regular rates and rhythm    DIAGNOSTICS:      Not indicated    IMPRESSION     ASA Class 2 - Mild systemic disease    PLAN:     EGD    The above has been forwarded to the consulting provider.    Signed " Electronically by: Tamir Rodgers  January 24, 2018

## 2018-01-26 ENCOUNTER — HOSPITAL ENCOUNTER (OUTPATIENT)
Dept: ULTRASOUND IMAGING | Facility: CLINIC | Age: 53
Discharge: HOME | End: 2018-01-26
Attending: FAMILY MEDICINE | Admitting: FAMILY MEDICINE
Payer: COMMERCIAL

## 2018-01-26 DIAGNOSIS — E04.1 THYROID NODULE: ICD-10-CM

## 2018-01-26 PROCEDURE — 10022 US BIOPSY THYROID FINE NEEDLE ASPIRATION: CPT

## 2018-01-26 PROCEDURE — 88173 CYTOPATH EVAL FNA REPORT: CPT | Mod: 26 | Performed by: FAMILY MEDICINE

## 2018-01-26 PROCEDURE — 00000102 ZZHCL STATISTIC CYTO WRIGHT STAIN TC: Performed by: FAMILY MEDICINE

## 2018-01-26 PROCEDURE — 88173 CYTOPATH EVAL FNA REPORT: CPT | Performed by: FAMILY MEDICINE

## 2018-01-26 NOTE — PROGRESS NOTES
Right thyroid nodule biopsy completed per Dr. Otero without difficulty patient tolerated well. All discharge criteria were met and patient discharged to home ambulatory by self in stable condition.

## 2018-01-26 NOTE — PROGRESS NOTES
RADIOLOGY PROCEDURE NOTE  Patient name: Lynn Thompson  MRN: [id number]  : 1965    Pre-procedure diagnosis: thyroid nodule, right  Post-procedure diagnosis: Same    Procedure Date/Time: 2018  2:25 PM  Procedure: US guided thyroid FNA  Estimated blood loss: None  Specimen(s) collected with description: FNA  The patient tolerated the procedure well with no immediate complications.  Significant findings:right thyroid nodule    See imaging dictation for procedural details.    Provider name: Tom Otero  Assistant(s):None

## 2018-01-30 LAB — COPATH REPORT: NORMAL

## 2018-02-06 ENCOUNTER — TELEPHONE (OUTPATIENT)
Dept: FAMILY MEDICINE | Facility: CLINIC | Age: 53
End: 2018-02-06

## 2018-02-10 DIAGNOSIS — K21.9 GASTROESOPHAGEAL REFLUX DISEASE WITHOUT ESOPHAGITIS: ICD-10-CM

## 2018-02-10 DIAGNOSIS — Z98.84 GASTRIC BYPASS STATUS FOR OBESITY: ICD-10-CM

## 2018-02-10 DIAGNOSIS — R11.0 NAUSEA: ICD-10-CM

## 2018-02-10 NOTE — TELEPHONE ENCOUNTER
Requested Prescriptions   Pending Prescriptions Disp Refills     omeprazole (PRILOSEC) 40 MG capsule  Last Written Prescription Date:  02/02/2017  Last Fill Quantity: 180,  # refills: 3   Last office visit: No previous visit found with prescribing provider:  01/10/2018   Future Office Visit:     180 capsule 3     Sig: Take 1 capsule (40 mg) by mouth 2 times daily Take 30-60 minutes before a meal.    There is no refill protocol information for this order

## 2018-02-12 RX ORDER — OMEPRAZOLE 40 MG/1
40 CAPSULE, DELAYED RELEASE ORAL 2 TIMES DAILY
Qty: 180 CAPSULE | Refills: 1 | Status: SHIPPED | OUTPATIENT
Start: 2018-02-12 | End: 2018-08-09

## 2018-02-12 NOTE — TELEPHONE ENCOUNTER
"Requested Prescriptions   Pending Prescriptions Disp Refills     omeprazole (PRILOSEC) 40 MG capsule 180 capsule 3     Sig: Take 1 capsule (40 mg) by mouth 2 times daily Take 30-60 minutes before a meal.    PPI Protocol Passed    2/10/2018  3:09 PM       Passed - Not on Clopidogrel (unless Pantoprazole ordered)       Passed - No diagnosis of osteoporosis on record       Passed - Recent or future visit with authorizing provider's specialty    Patient had office visit in the last year or has a visit in the next 30 days with authorizing provider.  See \"Patient Info\" tab in inbasket, or \"Choose Columns\" in Meds & Orders section of the refill encounter.            Passed - Patient is age 18 or older       Passed - No active pregnacy on record       Passed - No positive pregnancy test in past 12 months        "

## 2018-02-12 NOTE — TELEPHONE ENCOUNTER
Prescription approved per [facility] Refill Protocol.    Lennie HAILE RN, BSN, PHN  [place] Flex RN

## 2018-03-19 ENCOUNTER — TRANSFERRED RECORDS (OUTPATIENT)
Dept: HEALTH INFORMATION MANAGEMENT | Facility: CLINIC | Age: 53
End: 2018-03-19

## 2018-03-26 DIAGNOSIS — E03.9 ACQUIRED HYPOTHYROIDISM: ICD-10-CM

## 2018-03-26 LAB — TSH SERPL DL<=0.005 MIU/L-ACNC: 3.27 MU/L (ref 0.4–4)

## 2018-03-26 PROCEDURE — 84443 ASSAY THYROID STIM HORMONE: CPT | Performed by: FAMILY MEDICINE

## 2018-03-26 PROCEDURE — 36415 COLL VENOUS BLD VENIPUNCTURE: CPT | Performed by: FAMILY MEDICINE

## 2018-04-02 DIAGNOSIS — E03.9 ACQUIRED HYPOTHYROIDISM: ICD-10-CM

## 2018-04-03 RX ORDER — LEVOTHYROXINE SODIUM 88 UG/1
88 TABLET ORAL DAILY
Qty: 90 TABLET | Refills: 1 | Status: SHIPPED | OUTPATIENT
Start: 2018-04-03 | End: 2018-10-01

## 2018-04-03 NOTE — TELEPHONE ENCOUNTER
"Requested Prescriptions   Pending Prescriptions Disp Refills     levothyroxine (SYNTHROID/LEVOTHROID) 88 MCG tablet  Last Written Prescription Date:  01/10/2018  Last Fill Quantity: 90,  # refills: 0   Last office visit: No previous visit found with prescribing provider:  01/10/2018   Future Office Visit:     90 tablet 0     Sig: Take 1 tablet (88 mcg) by mouth daily    Thyroid Protocol Passed    4/2/2018  8:43 PM       Passed - Patient is 12 years or older       Passed - Recent (12 mo) or future (30 days) visit within the authorizing provider's specialty    Patient had office visit in the last 12 months or has a visit in the next 30 days with authorizing provider or within the authorizing provider's specialty.  See \"Patient Info\" tab in inbasket, or \"Choose Columns\" in Meds & Orders section of the refill encounter.           Passed - Normal TSH on file in past 12 months    Recent Labs   Lab Test  03/26/18   0816   TSH  3.27             Passed - No active pregnancy on record    If patient is pregnant or has had a positive pregnancy test, please check TSH.         Passed - No positive pregnancy test in past 12 months    If patient is pregnant or has had a positive pregnancy test, please check TSH.            "

## 2018-05-06 DIAGNOSIS — R11.0 NAUSEA: ICD-10-CM

## 2018-05-06 NOTE — TELEPHONE ENCOUNTER
"Requested Prescriptions   Pending Prescriptions Disp Refills     ranitidine (ZANTAC) 150 MG tablet  Last Written Prescription Date:  1/10/18  Last Fill Quantity: 60 TABLET,  # refills: 3   Last office visit: 1/10/2018 with prescribing provider:  SANG   Future Office Visit:     60 tablet 3     Sig: Take 1 tablet (150 mg) by mouth 2 times daily    H2 Blockers Protocol Passed    5/6/2018 10:16 AM       Passed - Patient is age 12 or older       Passed - Recent (12 mo) or future (30 days) visit within the authorizing provider's specialty    Patient had office visit in the last 12 months or has a visit in the next 30 days with authorizing provider or within the authorizing provider's specialty.  See \"Patient Info\" tab in inbasket, or \"Choose Columns\" in Meds & Orders section of the refill encounter.              "

## 2018-05-07 NOTE — TELEPHONE ENCOUNTER
Pt had EGD prior to taking a PPI.  Is she suppose to stay on the Zantac or change to a PPI?  Neel Zarate RN, BSN

## 2018-05-08 ENCOUNTER — TRANSFERRED RECORDS (OUTPATIENT)
Dept: HEALTH INFORMATION MANAGEMENT | Facility: CLINIC | Age: 53
End: 2018-05-08

## 2018-05-08 NOTE — TELEPHONE ENCOUNTER
Spoke with pt and she feels the zantac doing well    Prescription approved per [facility] Refill Protocol.  Neel Zarate RN, BSN

## 2018-06-04 DIAGNOSIS — E13.9 POST-PANCREATECTOMY DIABETES (H): Primary | ICD-10-CM

## 2018-06-04 DIAGNOSIS — E89.1 POST-PANCREATECTOMY DIABETES (H): Primary | ICD-10-CM

## 2018-06-04 DIAGNOSIS — Z90.410 POST-PANCREATECTOMY DIABETES (H): Primary | ICD-10-CM

## 2018-06-07 ENCOUNTER — ALLIED HEALTH/NURSE VISIT (OUTPATIENT)
Dept: TRANSPLANT | Facility: CLINIC | Age: 53
End: 2018-06-07
Attending: PEDIATRICS
Payer: COMMERCIAL

## 2018-06-07 VITALS
HEIGHT: 64 IN | HEART RATE: 59 BPM | TEMPERATURE: 98.4 F | WEIGHT: 145.2 LBS | OXYGEN SATURATION: 97 % | SYSTOLIC BLOOD PRESSURE: 131 MMHG | BODY MASS INDEX: 24.79 KG/M2 | RESPIRATION RATE: 18 BRPM | DIASTOLIC BLOOD PRESSURE: 70 MMHG

## 2018-06-07 DIAGNOSIS — E13.9 POST-PANCREATECTOMY DIABETES (H): Primary | ICD-10-CM

## 2018-06-07 DIAGNOSIS — E89.1 POST-PANCREATECTOMY DIABETES (H): ICD-10-CM

## 2018-06-07 DIAGNOSIS — E89.1 POST-PANCREATECTOMY DIABETES (H): Primary | ICD-10-CM

## 2018-06-07 DIAGNOSIS — Z90.410 POST-PANCREATECTOMY DIABETES (H): Primary | ICD-10-CM

## 2018-06-07 DIAGNOSIS — Z90.410 POST-PANCREATECTOMY DIABETES (H): ICD-10-CM

## 2018-06-07 DIAGNOSIS — E13.9 POST-PANCREATECTOMY DIABETES (H): ICD-10-CM

## 2018-06-07 LAB
ALBUMIN SERPL-MCNC: 3.6 G/DL (ref 3.4–5)
ALP SERPL-CCNC: 98 U/L (ref 40–150)
ALT SERPL W P-5'-P-CCNC: 29 U/L (ref 0–50)
ANION GAP SERPL CALCULATED.3IONS-SCNC: 7 MMOL/L (ref 3–14)
AST SERPL W P-5'-P-CCNC: 26 U/L (ref 0–45)
BASOPHILS # BLD AUTO: 0.1 10E9/L (ref 0–0.2)
BASOPHILS NFR BLD AUTO: 1 %
BILIRUB SERPL-MCNC: 0.2 MG/DL (ref 0.2–1.3)
BUN SERPL-MCNC: 14 MG/DL (ref 7–30)
C PEPTIDE SERPL-MCNC: 0.3 NG/ML (ref 0.9–6.9)
C PEPTIDE SERPL-MCNC: 1 NG/ML (ref 0.9–6.9)
CALCIUM SERPL-MCNC: 8.7 MG/DL (ref 8.5–10.1)
CHLORIDE SERPL-SCNC: 106 MMOL/L (ref 94–109)
CO2 SERPL-SCNC: 31 MMOL/L (ref 20–32)
CREAT SERPL-MCNC: 0.8 MG/DL (ref 0.52–1.04)
DIFFERENTIAL METHOD BLD: NORMAL
EOSINOPHIL # BLD AUTO: 0.1 10E9/L (ref 0–0.7)
EOSINOPHIL NFR BLD AUTO: 1.9 %
ERYTHROCYTE [DISTWIDTH] IN BLOOD BY AUTOMATED COUNT: 14.1 % (ref 10–15)
FERRITIN SERPL-MCNC: 152 NG/ML (ref 8–252)
GFR SERPL CREATININE-BSD FRML MDRD: 74 ML/MIN/1.7M2
GLUCOSE SERPL-MCNC: 107 MG/DL (ref 70–99)
GLUCOSE SERPL-MCNC: 204 MG/DL (ref 70–99)
GLUCOSE SERPL-MCNC: 271 MG/DL (ref 70–99)
HBA1C MFR BLD: 7.5 % (ref 0–5.6)
HCT VFR BLD AUTO: 38.5 % (ref 35–47)
HGB BLD-MCNC: 12.4 G/DL (ref 11.7–15.7)
IMM GRANULOCYTES # BLD: 0 10E9/L (ref 0–0.4)
IMM GRANULOCYTES NFR BLD: 0.2 %
IRON SATN MFR SERPL: 26 % (ref 15–46)
IRON SERPL-MCNC: 61 UG/DL (ref 35–180)
LYMPHOCYTES # BLD AUTO: 2 10E9/L (ref 0.8–5.3)
LYMPHOCYTES NFR BLD AUTO: 31.7 %
MCH RBC QN AUTO: 30.4 PG (ref 26.5–33)
MCHC RBC AUTO-ENTMCNC: 32.2 G/DL (ref 31.5–36.5)
MCV RBC AUTO: 94 FL (ref 78–100)
MONOCYTES # BLD AUTO: 0.6 10E9/L (ref 0–1.3)
MONOCYTES NFR BLD AUTO: 10.3 %
NEUTROPHILS # BLD AUTO: 3.4 10E9/L (ref 1.6–8.3)
NEUTROPHILS NFR BLD AUTO: 54.9 %
NRBC # BLD AUTO: 0 10*3/UL
NRBC BLD AUTO-RTO: 0 /100
PLATELET # BLD AUTO: 328 10E9/L (ref 150–450)
POTASSIUM SERPL-SCNC: 3.8 MMOL/L (ref 3.4–5.3)
PREALB SERPL IA-MCNC: 22 MG/DL (ref 15–45)
PROT SERPL-MCNC: 7.1 G/DL (ref 6.8–8.8)
RBC # BLD AUTO: 4.08 10E12/L (ref 3.8–5.2)
SODIUM SERPL-SCNC: 144 MMOL/L (ref 133–144)
TIBC SERPL-MCNC: 236 UG/DL (ref 240–430)
VIT B12 SERPL-MCNC: 399 PG/ML (ref 193–986)
WBC # BLD AUTO: 6.2 10E9/L (ref 4–11)

## 2018-06-07 PROCEDURE — 82607 VITAMIN B-12: CPT | Performed by: PEDIATRICS

## 2018-06-07 PROCEDURE — 84630 ASSAY OF ZINC: CPT | Performed by: PEDIATRICS

## 2018-06-07 PROCEDURE — 80053 COMPREHEN METABOLIC PANEL: CPT | Performed by: PEDIATRICS

## 2018-06-07 PROCEDURE — 84446 ASSAY OF VITAMIN E: CPT | Performed by: PEDIATRICS

## 2018-06-07 PROCEDURE — 84134 ASSAY OF PREALBUMIN: CPT | Performed by: PEDIATRICS

## 2018-06-07 PROCEDURE — 82747 ASSAY OF FOLIC ACID RBC: CPT | Performed by: PEDIATRICS

## 2018-06-07 PROCEDURE — G0463 HOSPITAL OUTPT CLINIC VISIT: HCPCS | Mod: ZF

## 2018-06-07 PROCEDURE — 83540 ASSAY OF IRON: CPT | Performed by: PEDIATRICS

## 2018-06-07 PROCEDURE — 83036 HEMOGLOBIN GLYCOSYLATED A1C: CPT | Performed by: PEDIATRICS

## 2018-06-07 PROCEDURE — 84590 ASSAY OF VITAMIN A: CPT | Performed by: PEDIATRICS

## 2018-06-07 PROCEDURE — 83550 IRON BINDING TEST: CPT | Performed by: PEDIATRICS

## 2018-06-07 PROCEDURE — 36415 COLL VENOUS BLD VENIPUNCTURE: CPT | Performed by: PEDIATRICS

## 2018-06-07 PROCEDURE — 84681 ASSAY OF C-PEPTIDE: CPT | Performed by: PEDIATRICS

## 2018-06-07 PROCEDURE — 85025 COMPLETE CBC W/AUTO DIFF WBC: CPT | Performed by: PEDIATRICS

## 2018-06-07 PROCEDURE — 82542 COL CHROMOTOGRAPHY QUAL/QUAN: CPT | Performed by: PEDIATRICS

## 2018-06-07 PROCEDURE — 82306 VITAMIN D 25 HYDROXY: CPT | Performed by: PEDIATRICS

## 2018-06-07 PROCEDURE — 82728 ASSAY OF FERRITIN: CPT | Performed by: PEDIATRICS

## 2018-06-07 PROCEDURE — 82947 ASSAY GLUCOSE BLOOD QUANT: CPT | Mod: 91 | Performed by: PEDIATRICS

## 2018-06-07 ASSESSMENT — PAIN SCALES - GENERAL: PAINLEVEL: NO PAIN (0)

## 2018-06-07 NOTE — PATIENT INSTRUCTIONS
1)  Lantus:  Let's go up to 5 units on the Lantus daily.      2)  Novolog coverage: let's go up on your food coverage Novolog and do take correction if you are running high.                   What your blood glucose is before eating:   How much you plan to eat: 80- 150 mg/dL 151- 250 mg/dL 251 to 350 mg/dL   Less than 30 grams carb 0 0.5 1   31-60 grams carb 0.5 1 1.5   More than 60 grams carb 1 1.5 2      3)  Dietitian visit with diabetes dietitian to review carb counting.  Let's see if we can set this up at [facility].      Follow Up:  Sept 20 at 10:00am.

## 2018-06-07 NOTE — LETTER
6/7/2018      RE: Lynn Thompson  [address] [facility] [id number]       [facility]  Islet Autotransplant, Diabetes Follow Up    Problem List:  Patient Active Problem List   Diagnosis     Iron deficiency anemia secondary to inadequate dietary iron intake     Gastric bypass status for obesity     Health Care Home     Chronic pain syndrome     Exocrine pancreatic insufficiency     Asplenia     BLU (obstructive sleep apnea)     S/P exploratory laparotomy     Dysthymia     Anxiety     Thyroid nodule     Acquired hypothyroidism     Post-pancreatectomy diabetes (H)     E coli bacteremia     Antibiotic-associated diarrhea     Elevated LFTs       HPI:  Lynn is a 52 year old female here for follow up o total pancreatectomy, islet cell autotransplant, splenectomy, liver biopsy (needle core), choledochoduodenostomy, feeding jejunostomy performed on 5/10/2013.  At the time of the procedure, the patient received 178,100 IEQ, or 3,209 IEQ/kg body weight. She has had two subsequent exploratory laparatomy for small bowel obstruction. Other notable endocrine history includes a complex cystic nodule in mid right thyroid lobe with 1 cm maximum diameter (saw Dr Adorno in 11/2016) which needs annual follow up U/S in Nov 2017, and mild hypothyroidism followed by PCP.  She had an episode of cholangitis and was hospitalized for 4 days 8/24/17 (fevers, RUQ pain, + Ecoli blood cx).    She was on NO insulin at her 1 year, 2 year, 3 year, 4 year transplant anniversaries and restart insulin just after 4 years post-tx, insulin restart date of  6/6/2017.  She is continuing to wean narcotics, on a suboxone wean, now on a 1/2 patch which I believe is a dose of 1 mg of bupronephrone (1/2 of the Suboxone 1- 0.5 mg film) daily, with no other narcotics.     At today's visit, overall Lynn is doing very well!  She has some GI symptoms as noted below that she attributes to stress.  She has had  stressors recently including brain tumors in sister in law, failing health of father in law (who could not come to Thanksgiving dinner), stressors around disability.  We reviewed the followin)  Diabetes:  Lynn continues to have partial islet graft function.  Her A1c is up.  She has been experiencing increased stress and anxiety and notices that her anxiety will spike her BG.  She has not had hypoglycemia recently. She sometimes does not take correction dosing and just waits to see if her own body brings her BG down.  She does note that she does not remember very well how to count carbs since she was trained so long ago and that it would be helpful to her to meet w a dietitian again.    Diabetes history:  Current insulin regimen:  Lantus 4 units per day  Novolog 0.5 units if eating >45g, and correction scale of 0.5 u per 100 >150  TDD: 5 unit per day, total insulin on average.      Recent A1c:  Lab Results   Component Value Date    A1C 7.5 2018    A1C 6.3 2017    A1C 6.5 2017    A1C 7.0 2017    A1C 7.2 2016         Hypoglycemia history:  None recent (lowest is 74 mg/dL).  The patient has had NO episodes of severe hypoglycemia (seizure, coma, or neuroglycopenic symptoms severe enough to require assistance from another person).  Blood sugars were reviewed from the patient records and/or the meter download.    Average B mg/dL SD 35 mg/dL  Number of blood sugar checks per day 3.3 in this meter-- needs strips for 4 checks per day.     2)  GI symptoms:   With recent stressors has had:  -- still has some mild 'dumping' with GI pain/cramping that occurs about 20 minutes after eating.  Anxiety affects these symptoms.  -- stools are better, not having steatorrhea.  -- does not have any pancreatitis pain (!) and is better with regards to pain control  -- but is still weaning off suboxone, is now on  strip which is the last step    3)  Reviewed that she has been seen for her  anxiety in the past, but that provider gone so needs new one, which she is wanting to set up closer to home.        Review of systems:  Review Of Systems  Skin: negative  Eyes: negative  Ears/Nose/Throat: negative  Respiratory: No shortness of breath, dyspnea on exertion, cough, or hemoptysis  Cardiovascular: negative  Gastrointestinal: as above  Genitourinary: negative  Musculoskeletal: negative  Neurologic: negative  Psychiatric: anxiety  Hematologic/Lymphatic/Immunologic: negative  Endocrine: as above    Past Medical and Surgical History:  Past Medical History:   Diagnosis Date     Abdominal pain, epigastric 11/05, ongoing; goes to pain clinic    probable recurrent spasm sphincter of Oddi causing biliary colic pains      Benign paroxysmal positional vertigo     occ.      Calculus of kidney 5/05    x1 on L side passed, several stones.  Has been tested for oxalate.     Chronic abdominal pain 7/17/2013     Chronic pancreatitis (H) 7/17/2013     Depressive disorder, not elsewhere classified     also occ panic spells     Diabetes (H)     post pancreatectomy     Dyspepsia and other specified disorders of function of stomach 6/99    H. 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    ERCP with Stent of CBD by Fernandez @ [facility] [place] with sx relief     Spasm of sphincter of Oddi     surgical + endoscopic stenting of pancreatic duct @ [facility] [place] 5/23/06     Thyroid nodule 11/1/2016     Ureteral calculus 10/2/2012     Vaccination not carried out      Past Surgical History:   Procedure Laterality Date      APPENDECTOMY       C NONSPECIFIC PROCEDURE      R bunion     C NONSPECIFIC PROCEDURE      T & A     C NONSPECIFIC PROCEDURE      partial ileum resection     C NONSPECIFIC PROCEDURE      R ovarian cyst     C NONSPECIFIC PROCEDURE           C NONSPECIFIC PROCEDURE      GALL BLADDER     C NONSPECIFIC PROCEDURE      CBD stent; Dr. Presley     C NONSPECIFIC PROCEDURE   &     colonoscopy     C NONSPECIFIC PROCEDURE      Gastric bypass     CHOLECYSTECTOMY       COLONOSCOPY       COMBINED CYSTOSCOPY, RETROGRADES, URETEROSCOPY, LASER HOLMIUM LITHOTRIPSY URETER(S), INSERT STENT Right 3/23/2015    Procedure: COMBINED CYSTOSCOPY, RETROGRADES, URETEROSCOPY, LASER HOLMIUM LITHOTRIPSY URETER(S), INSERT STENT;  Surgeon: Kennedi Aldana MD;  [facility]     COMBINED CYSTOSCOPY, RETROGRADES, URETEROSCOPY, LASER HOLMIUM LITHOTRIPSY URETER(S), INSERT STENT Right 2015    Procedure: COMBINED CYSTOSCOPY, RETROGRADES, URETEROSCOPY, LASER HOLMIUM LITHOTRIPSY URETER(S), INSERT STENT;  Surgeon: Kennedi Aldana MD;  [facility]     COSMETIC SURGERY  2002    Tum sherrie     CYSTOSCOPY, RETROGRADES, INSERT STENT URETER(S), COMBINED  10/2/2012    Procedure: COMBINED CYSTOSCOPY, RETROGRADES, INSERT STENT URETER(S);  COMBINED CYSTOSCOPY,  , INSERT LEFT STENT URETER;  Surgeon: Johny Baez MD;  Location: [facility] OR     ENT SURGERY       ESOPHAGOSCOPY, GASTROSCOPY, DUODENOSCOPY (EGD), COMBINED N/A 2018    Procedure: COMBINED ESOPHAGOSCOPY, GASTROSCOPY, DUODENOSCOPY (EGD);  ESOPHAGOSCOPY, GASTROSCOPY, DUODENOSCOPY (EGD)    ;  Surgeon: Tamir Rodgers MD;  Location:  GI     EXTRACORPOREAL SHOCK WAVE LITHOTRIPSY (ESWL)  10/16/2012    Procedure: EXTRACORPOREAL SHOCK WAVE LITHOTRIPSY (ESWL);  left EXTRACORPOREAL SHOCK WAVE LITHOTRIPSY (ESWL) ;  Surgeon: Johny Baez MD;  [facility]: [facility] OR     GI SURGERY  2015    Small bowel obstruction     HC  LAP,LYSIS OF ADHESIONS       HERNIA REPAIR  2015     LAPAROSCOPIC LYSIS ADHESIONS N/A 2015    Procedure: LAPAROSCOPIC LYSIS ADHESIONS;  Surgeon: Aaron Early MD;  [facility]     LAPAROSCOPIC LYSIS ADHESIONS N/A 2015    Procedure: LAPAROSCOPIC LYSIS ADHESIONS;  Surgeon: Aaron Early MD;  [facility]     PANCREATECTOMY, TRANSPLANT AUTO ISLET CELL, COMBINED  5/10/2013    Procedure: COMBINED PANCREATECTOMY, TRANSPLANT AUTO ISLET CELL;  Pancreatectomy, Auto Islet Cell Transplant   hernia repair, jejunostomy tube and liver biopsies with Anesthesia General with block;  Surgeon: Aaron Early MD;  [facility]     TRANSPLANT  5/10/13       Family History:  New changes since last visit:  none  Family History   Problem Relation Age of Onset     Family History Negative Mother      Respiratory Father      COPD;  at 69     Genitourinary Problems Father      kidney stones     Substance Abuse Father      Depression Father      Asthma Father      HEART DISEASE Paternal Grandfather      M.I.     Coronary Artery Disease Paternal Grandfather      Hyperlipidemia Paternal Grandfather      Genitourinary Problems Brother      multiple brothers with kidney stones     GASTROINTESTINAL DISEASE Maternal Grandmother      undiagnosed 'gut' issues     Coronary Artery Disease Maternal Grandfather      Hyperlipidemia Maternal Grandfather      CEREBROVASCULAR DISEASE Paternal Grandmother      At the age of 103     Anxiety Disorder Paternal Grandmother      OSTEOPOROSIS Paternal Grandmother      Anxiety Disorder Son      Anxiety Disorder Daughter      Asthma Daughter        Social History:  Social History     Social History Narrative     She was previously laid off from job and found that the stress reduction and getting away from work environment has helped greatly with her health.  Seeking disability.  Some stress at home including ill cat, w/infection and recent leg amputation.       Physical Exam:  Vitals: BP  "131/70 (Patient Position: Sitting)  Pulse 59  Temp 98.4  F (36.9  C) (Oral)  Resp 18  Ht 1.626 m (5' 4\")  Wt 65.9 kg (145 lb 3.2 oz)  LMP 12/19/2013  SpO2 97%  BMI 24.92 kg/m2  BMI= Body mass index is 24.92 kg/(m^2).  General:  Well-appearing, NAD  Psych:  Communicative, with normal affect         Assessment:  1.  Post pancreatectomy diabetes mellitus, s/p total pancreatectomy and islet autotransplant.    Lynn is a 52 year old with history of chronic pancreatitis who is s/p total pancreatectomy and islet autotransplant.  She was insulin independent until 6/6/2017 (just after 4 years post-tx) and now has partial islet function.    Her glycemic control has worsened since I last saw her, but she remains on very low doses of insulin.  We adjusted up on the glargine and the novolog coverage as below.    Plan:  1.  Changes to current diabetes regimen:  Patient Instructions     1)  Lantus:  Let's go up to 5 units on the Lantus daily.      2)  Novolog coverage: let's go up on your food coverage Novolog and do take correction if you are running high.                   What your blood glucose is before eating:   How much you plan to eat: 80- 150 mg/dL 151- 250 mg/dL 251 to 350 mg/dL   Less than 30 grams carb 0 0.5 1   31-60 grams carb 0.5 1 1.5   More than 60 grams carb 1 1.5 2      3)  Dietitian visit with diabetes dietitian to review carb counting.  Let's see if we can set this up at [facility].      Follow Up:  Sept 20 at 10:00am.        2.  Frequency of blood sugar checks:  premeal and bedtime, and as needed for hypoglycemia (6 strip per day).    3.  Continue routine follow up for autoislet transplant patients:  Mixed meal test (6 mL/kg BoostHP to max of 360 mL) at 3 months, 6 months, and once a year post transplant.  Hemoglobin A1c levels at these time points and quarterly.  4.  Other issues addressed today:  As above    Follow up:  3 mos    Contact me for questions at [phone] or [phone].  " Emergency number to reach pediatric endocrinology after hours is [phone].        Adriana Robles MD  , Pediatric Endocrinology and Diabetes  [facility] [place]  [facility]    I spent 20 minutes face to face with Lynn, with more than 50% of that time counseling on plan of care.    Adriana Robles MD

## 2018-06-07 NOTE — PROGRESS NOTES
[facility]  Islet Autotransplant, Diabetes Follow Up    Problem List:  Patient Active Problem List   Diagnosis     Iron deficiency anemia secondary to inadequate dietary iron intake     Gastric bypass status for obesity     Health Care Home     Chronic pain syndrome     Exocrine pancreatic insufficiency     Asplenia     BLU (obstructive sleep apnea)     S/P exploratory laparotomy     Dysthymia     Anxiety     Thyroid nodule     Acquired hypothyroidism     Post-pancreatectomy diabetes (H)     E coli bacteremia     Antibiotic-associated diarrhea     Elevated LFTs       HPI:  Lynn is a 52 year old female here for follow up o total pancreatectomy, islet cell autotransplant, splenectomy, liver biopsy (needle core), choledochoduodenostomy, feeding jejunostomy performed on 5/10/2013.  At the time of the procedure, the patient received 178,100 IEQ, or 3,209 IEQ/kg body weight. She has had two subsequent exploratory laparatomy for small bowel obstruction. Other notable endocrine history includes a complex cystic nodule in mid right thyroid lobe with 1 cm maximum diameter (saw Dr Adorno in 11/2016) which needs annual follow up U/S in Nov 2017, and mild hypothyroidism followed by PCP.  She had an episode of cholangitis and was hospitalized for 4 days 8/24/17 (fevers, RUQ pain, + Ecoli blood cx).    She was on NO insulin at her 1 year, 2 year, 3 year, 4 year transplant anniversaries and restart insulin just after 4 years post-tx, insulin restart date of  6/6/2017.  She is continuing to wean narcotics, on a suboxone wean, now on a 1/2 patch which I believe is a dose of 1 mg of bupronephrone (1/2 of the Suboxone 1- 0.5 mg film) daily, with no other narcotics.     At today's visit, overall Lynn is doing very well!  She has some GI symptoms as noted below that she attributes to stress.  She has had stressors recently including brain tumors in sister in law, failing health of father in law  (who could not come to Thanksgiving dinner), stressors around disability.  We reviewed the followin)  Diabetes:  Lynn continues to have partial islet graft function.  Her A1c is up.  She has been experiencing increased stress and anxiety and notices that her anxiety will spike her BG.  She has not had hypoglycemia recently. She sometimes does not take correction dosing and just waits to see if her own body brings her BG down.  She does note that she does not remember very well how to count carbs since she was trained so long ago and that it would be helpful to her to meet w a dietitian again.    Diabetes history:  Current insulin regimen:  Lantus 4 units per day  Novolog 0.5 units if eating >45g, and correction scale of 0.5 u per 100 >150  TDD: 5 unit per day, total insulin on average.      Recent A1c:  Lab Results   Component Value Date    A1C 7.5 2018    A1C 6.3 2017    A1C 6.5 2017    A1C 7.0 2017    A1C 7.2 2016         Hypoglycemia history:  None recent (lowest is 74 mg/dL).  The patient has had NO episodes of severe hypoglycemia (seizure, coma, or neuroglycopenic symptoms severe enough to require assistance from another person).  Blood sugars were reviewed from the patient records and/or the meter download.    Average B mg/dL SD 35 mg/dL  Number of blood sugar checks per day 3.3 in this meter-- needs strips for 4 checks per day.     2)  GI symptoms:   With recent stressors has had:  -- still has some mild 'dumping' with GI pain/cramping that occurs about 20 minutes after eating.  Anxiety affects these symptoms.  -- stools are better, not having steatorrhea.  -- does not have any pancreatitis pain (!) and is better with regards to pain control  -- but is still weaning off suboxone, is now on  strip which is the last step    3)  Reviewed that she has been seen for her anxiety in the past, but that provider gone so needs new one, which she is wanting to set up  closer to home.        Review of systems:  Review Of Systems  Skin: negative  Eyes: negative  Ears/Nose/Throat: negative  Respiratory: No shortness of breath, dyspnea on exertion, cough, or hemoptysis  Cardiovascular: negative  Gastrointestinal: as above  Genitourinary: negative  Musculoskeletal: negative  Neurologic: negative  Psychiatric: anxiety  Hematologic/Lymphatic/Immunologic: negative  Endocrine: as above    Past Medical and Surgical History:  Past Medical History:   Diagnosis Date     Abdominal pain, epigastric 11/05, ongoing; goes to pain clinic    probable recurrent spasm sphincter of Oddi causing biliary colic pains      Benign paroxysmal positional vertigo     occ.      Calculus of kidney 5/05    x1 on L side passed, several stones.  Has been tested for oxalate.     Chronic abdominal pain 7/17/2013     Chronic pancreatitis (H) 7/17/2013     Depressive disorder, not elsewhere classified     also occ panic spells     Diabetes (H)     post pancreatectomy     Dyspepsia and other specified disorders of function of stomach 6/99    H. 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    ERCP with Stent of CBD by Fernandez @ [facility] [place] with sx relief     Spasm of sphincter of Oddi     surgical + endoscopic stenting of pancreatic duct @ [facility] [place] 5/23/06     Thyroid nodule 11/1/2016     Ureteral calculus 10/2/2012     Vaccination not carried out      Past Surgical History:   Procedure Laterality Date     APPENDECTOMY  1990     C NONSPECIFIC PROCEDURE  12/98    R howie     C NONSPECIFIC PROCEDURE       T & A     C NONSPECIFIC PROCEDURE      partial ileum resection     C NONSPECIFIC PROCEDURE      R ovarian cyst     C NONSPECIFIC PROCEDURE           C NONSPECIFIC PROCEDURE      GALL BLADDER     C NONSPECIFIC PROCEDURE      CBD stent; Dr. Fernandez MAY NONSPECIFIC PROCEDURE   &     colonoscopy     C NONSPECIFIC PROCEDURE      Gastric bypass     CHOLECYSTECTOMY       COLONOSCOPY       COMBINED CYSTOSCOPY, RETROGRADES, URETEROSCOPY, LASER HOLMIUM LITHOTRIPSY URETER(S), INSERT STENT Right 3/23/2015    Procedure: COMBINED CYSTOSCOPY, RETROGRADES, URETEROSCOPY, LASER HOLMIUM LITHOTRIPSY URETER(S), INSERT STENT;  Surgeon: Kennedi Aldana MD;  [facility]     COMBINED CYSTOSCOPY, RETROGRADES, URETEROSCOPY, LASER HOLMIUM LITHOTRIPSY URETER(S), INSERT STENT Right 2015    Procedure: COMBINED CYSTOSCOPY, RETROGRADES, URETEROSCOPY, LASER HOLMIUM LITHOTRIPSY URETER(S), INSERT STENT;  Surgeon: Kennedi Aldana MD;  [facility]     COSMETIC SURGERY  2002    Tummy tuck     CYSTOSCOPY, RETROGRADES, INSERT STENT URETER(S), COMBINED  10/2/2012    Procedure: COMBINED CYSTOSCOPY, RETROGRADES, INSERT STENT URETER(S);  COMBINED CYSTOSCOPY,  , INSERT LEFT STENT URETER;  Surgeon: Johny Baez MD;  Location: [facility] OR     ENT SURGERY       ESOPHAGOSCOPY, GASTROSCOPY, DUODENOSCOPY (EGD), COMBINED N/A 2018    Procedure: COMBINED ESOPHAGOSCOPY, GASTROSCOPY, DUODENOSCOPY (EGD);  ESOPHAGOSCOPY, GASTROSCOPY, DUODENOSCOPY (EGD)    ;  Surgeon: Tamir Rodgers MD;  Location:  GI     EXTRACORPOREAL SHOCK WAVE LITHOTRIPSY (ESWL)  10/16/2012    Procedure: EXTRACORPOREAL SHOCK WAVE LITHOTRIPSY (ESWL);  left EXTRACORPOREAL SHOCK WAVE LITHOTRIPSY (ESWL) ;  Surgeon: Johny Baez MD;  Location: [facility] OR     GI SURGERY  2015    Small bowel obstruction     HC LAP,LYSIS OF ADHESIONS       HERNIA REPAIR  2015     LAPAROSCOPIC LYSIS ADHESIONS N/A 2015     Procedure: LAPAROSCOPIC LYSIS ADHESIONS;  Surgeon: Aaron Early MD;  [facility]     LAPAROSCOPIC LYSIS ADHESIONS N/A 2015    Procedure: LAPAROSCOPIC LYSIS ADHESIONS;  Surgeon: Aaron Early MD;  [facility]     PANCREATECTOMY, TRANSPLANT AUTO ISLET CELL, COMBINED  5/10/2013    Procedure: COMBINED PANCREATECTOMY, TRANSPLANT AUTO ISLET CELL;  Pancreatectomy, Auto Islet Cell Transplant   hernia repair, jejunostomy tube and liver biopsies with Anesthesia General with block;  Surgeon: Aaron Early MD;  [facility]     TRANSPLANT  5/10/13       Family History:  New changes since last visit:  none  Family History   Problem Relation Age of Onset     Family History Negative Mother      Respiratory Father      COPD;  at 69     Genitourinary Problems Father      kidney stones     Substance Abuse Father      Depression Father      Asthma Father      HEART DISEASE Paternal Grandfather      M.I.     Coronary Artery Disease Paternal Grandfather      Hyperlipidemia Paternal Grandfather      Genitourinary Problems Brother      multiple brothers with kidney stones     GASTROINTESTINAL DISEASE Maternal Grandmother      undiagnosed 'gut' issues     Coronary Artery Disease Maternal Grandfather      Hyperlipidemia Maternal Grandfather      CEREBROVASCULAR DISEASE Paternal Grandmother      At the age of 103     Anxiety Disorder Paternal Grandmother      OSTEOPOROSIS Paternal Grandmother      Anxiety Disorder Son      Anxiety Disorder Daughter      Asthma Daughter        Social History:  Social History     Social History Narrative     She was previously laid off from job and found that the stress reduction and getting away from work environment has helped greatly with her health.  Seeking disability.  Some stress at home including ill cat, w/infection and recent leg amputation.       Physical Exam:  Vitals: /70 (Patient Position: Sitting)  Pulse 59  Temp 98.4  F (36.9  C) (Oral)  Resp 18  Ht  "1.626 m (5' 4\")  Wt 65.9 kg (145 lb 3.2 oz)  LMP 12/19/2013  SpO2 97%  BMI 24.92 kg/m2  BMI= Body mass index is 24.92 kg/(m^2).  General:  Well-appearing, NAD  Psych:  Communicative, with normal affect         Assessment:  1.  Post pancreatectomy diabetes mellitus, s/p total pancreatectomy and islet autotransplant.    Lynn is a 52 year old with history of chronic pancreatitis who is s/p total pancreatectomy and islet autotransplant.  She was insulin independent until 6/6/2017 (just after 4 years post-tx) and now has partial islet function.    Her glycemic control has worsened since I last saw her, but she remains on very low doses of insulin.  We adjusted up on the glargine and the novolog coverage as below.    Plan:  1.  Changes to current diabetes regimen:  Patient Instructions     1)  Lantus:  Let's go up to 5 units on the Lantus daily.      2)  Novolog coverage: let's go up on your food coverage Novolog and do take correction if you are running high.                   What your blood glucose is before eating:   How much you plan to eat: 80- 150 mg/dL 151- 250 mg/dL 251 to 350 mg/dL   Less than 30 grams carb 0 0.5 1   31-60 grams carb 0.5 1 1.5   More than 60 grams carb 1 1.5 2      3)  Dietitian visit with diabetes dietitian to review carb counting.  Let's see if we can set this up at [facility].      Follow Up:  Sept 20 at 10:00am.        2.  Frequency of blood sugar checks:  premeal and bedtime, and as needed for hypoglycemia (6 strip per day).    3.  Continue routine follow up for autoislet transplant patients:  Mixed meal test (6 mL/kg BoostHP to max of 360 mL) at 3 months, 6 months, and once a year post transplant.  Hemoglobin A1c levels at these time points and quarterly.  4.  Other issues addressed today:  As above    Follow up:  3 mos    Contact me for questions at [phone] or [phone].  Emergency number to reach pediatric endocrinology after hours is [phone].        Adriana " MD Margaret  , Pediatric Endocrinology and Diabetes  [facility] [place]  [facility]    I spent 20 minutes face to face with Lynn, with more than 50% of that time counseling on plan of care.

## 2018-06-07 NOTE — MR AVS SNAPSHOT
After Visit Summary   6/7/2018    Lynn Thompson    MRN: [id number]           Patient Information     Date Of Birth          1965        Visit Information        Provider Department      6/7/2018 10:00 AM Adriana Robles MD [facility] Solid [facility] Transplant        Today's Diagnoses     Post-pancreatectomy diabetes (H)    -  1      Care Instructions    1)  Lantus:  Let's go up to 5 units on the Lantus daily.      2)  Novolog coverage: let's go up on your food coverage Novolog and do take correction if you are running high.                   What your blood glucose is before eating:   How much you plan to eat: 80- 150 mg/dL 151- 250 mg/dL 251 to 350 mg/dL   Less than 30 grams carb 0 0.5 1   31-60 grams carb 0.5 1 1.5   More than 60 grams carb 1 1.5 2      3)  Dietitian visit with diabetes dietitian to review carb counting.  Let's see if we can set this up at [facility].      Follow Up:  Sept 20 at 10:00am.            Follow-ups after your visit        Additional Services     DIABETES EDUCATOR REFERRAL       DIABETES SELF MANAGEMENT TRAINING (DSMT)      Your provider has referred you to Diabetes Education: FMG: Diabetes Education - All [facility] [phone]   [email or web address]     If an urgent visit is needed or A1C is above 12, Care Team to call the Diabetes  Education Team at [phone] or send an In Basket message to the Diabetes Education Pool (P DIAB ED-PATIENT CARE).    A  will call you to make your appointment. If it has been more than 3 business days since your referral was placed, please call the above phone number to schedule.    Type of training and number of hours: Previous Diagnosis: Follow-up DSMT - 2 hours.    Diabetes Type: post pancreatectomy and post gastric bypass        Diabetes Education Topics: Other: medical nutrition/ carb counting only--  Dietitian referral    Special Educational Needs Requiring  Individual DSMT: Additional DSMT hours requested: 2 hours      Please be aware that coverage of these services is subject to the terms and limitations of your health insurance plan.  Call member services at your health plan to determine Diabetes Self-Management Training (Codes  and ) and Medical Nutrition Therapy (Codes 02395 and [id number]) benefits and ask which blood glucose monitor brands are covered by your plan.  Please bring the following with you to your appointment:    (1)  List of current medications   (2)  List of Blood Glucose Monitor brands that are covered by your insurance plan  (3)  Blood Glucose Monitor and log book  (4)   Food records for the 3 days prior to your visit    The Certified Diabetes Educator may make diabetes medication adjustments per the CDE Protocol and Collaborative Practice Agreement.                  Follow-up notes from your care team     Return in about 3 months (around 9/7/2018).      Your next 10 appointments already scheduled     Jun 07, 2018 10:00 AM CDT   (Arrive by 9:45 AM)   Return [place] with Adriana Robles MD   [facility] Solid Organ Transplant ([facility])    [address] [place] [address]  [address]  [facility] [address] [phone]              Who to contact     If you have questions or need follow up information about today's clinic visit or your schedule please contact [facility] directly at [phone].  Normal or non-critical lab and imaging results will be communicated to you by MyChart, letter or phone within 4 business days after the clinic has received the results. If you do not hear from us within 7 days, please contact the clinic through MyChart or phone. If you have a critical or abnormal lab result, we will notify you by phone as soon as possible.  Submit refill requests through [employer] or call your pharmacy and they will forward the refill request to us. Please allow 3 business days for your refill  "to be completed.          Additional Information About Your Visit        SampleOn Inchart Information     Service at Home gives you secure access to your electronic health record. If you see a primary care provider, you can also send messages to your care team and make appointments. If you have questions, please call your primary care clinic.  If you do not have a primary care provider, please call [phone] and they will assist you.        Care EveryWhere ID     This is your Care EveryWhere ID. This could be used by other organizations to access your [facility] medical records  [id number]        Your Vitals Were     Pulse Temperature Respirations Height Last Period Pulse Oximetry    59 98.4  F (36.9  C) (Oral) 18 1.626 m (5' 4\") 12/19/2013 97%    BMI (Body Mass Index)                   24.92 kg/m2            Blood Pressure from Last 3 Encounters:   06/07/18 131/70   01/24/18 99/57   01/10/18 110/80    Weight from Last 3 Encounters:   06/07/18 65.9 kg (145 lb 3.2 oz)   01/10/18 65.3 kg (144 lb)   12/07/17 66.1 kg (145 lb 11.2 oz)              We Performed the Following     DIABETES EDUCATOR REFERRAL          Today's Medication Changes          These changes are accurate as of 6/7/18  9:18 AM.  If you have any questions, ask your nurse or doctor.               These medicines have changed or have updated prescriptions.        Dose/Directions    busPIRone 15 MG tablet   Commonly known as:  BUSPAR   This may have changed:    - how much to take  - when to take this  - additional instructions   Used for:  Anxiety        Increased dose. 30 mg two times per day.   Quantity:  120 tablet   Refills:  3       insulin glargine 100 UNIT/ML injection   Commonly known as:  LANTUS SOLOSTAR   This may have changed:  when to take this   Used for:  Post-pancreatectomy diabetes (H)        Dose:  4 Units   Inject 4 Units Subcutaneous every 24 hours   Quantity:  15 mL   Refills:  1                Primary Care Provider Office Phone # Fax #    Maryana " Sivakumar Brooks -[phone]       [address]  [facility] [address]        Equal Access to Services     CHAD SOLANO : Nuha kapil ocasio delbert Tolentino, conner carolinelisaha, joshua karoberth turner, albin romanmikal alejandro. So [facility] [phone].    ATENCIÓN: Si habla español, tiene a rivera disposición servicios gratuitos de asistencia lingüística. Llame al [phone].    We comply with applicable federal civil rights laws and [place] laws. We do not discriminate on the basis of race, color, national origin, age, disability, sex, sexual orientation, or gender identity.            Thank you!     Thank you for choosing [facility]GAN TRANSPL[facility]  for your care. Our goal is always to provide you with excellent care. Hearing back from our patients is one way we can continue to improve our services. Please take a few minutes to complete the written survey that you may receive in the mail after your visit with us. Thank you!             Your Updated Medication List - Protect others around you: Learn how to safely use, store and throw away your medicines at www.disposemymeds.org.          This list is accurate as of 6/7/18  9:18 AM.  Always use your most recent med list.                   Brand Name Dispense Instructions for use Diagnosis    ACETAMINOPHEN PO      Take 325 mg by mouth every 6 hours as needed for pain        amylase-lipase-protease 90124 units Tabs tablet    VIOKACE    600 tablet    4-5 aps with meals and 1-2 with snacks    Pancreatic insufficiency       blood glucose lancing device     1 each    Use to test blood sugars 6 times daily or as directed.    Absence of pancreas, acquired       blood glucose monitoring test strip    BILL CONTOUR NEXT    2 Box    Check BS 4-6 times a day    Absence of pancreas, acquired       BOOST HIGH PROTEIN Liqd      After above baseline labs are drawn, give: 6 mL/kg to maximum of 360 mL; the beverage is to be consumed within 5 minutes.     Post-pancreatectomy diabetes (H)       * buprenorphine HCl-naloxone HCl 2-0.5 MG per film    SUBOXONE     Place 0.5 Film under the tongue every morning Patient states she takes an 1/8 of a strip daily.        * buprenorphine HCl-naloxone HCl 2-0.5 MG per film    SUBOXONE     Place 0.25 Film under the tongue every evening        busPIRone 15 MG tablet    BUSPAR    120 tablet    Increased dose. 30 mg two times per day.    Anxiety       ciprofloxacin 500 MG tablet    CIPRO    14 tablet    Take 1 tablet (500 mg) by mouth 2 times daily    Acute cystitis without hematuria       CLONIDINE HCL PO      Take 0.1 mg by mouth 2 times daily as needed        DOCUSATE SODIUM PO      Take 100 mg by mouth daily        DULoxetine 30 MG EC capsule    CYMBALTA    180 capsule    Take 1 capsule (30 mg) by mouth 2 times daily Fill at patient request.    Anxiety       ESTRACE VAGINAL 0.1 MG/GM cream   Generic drug:  estradiol     42.5 g    PLACE 2 GRAMS VAGINALLY TWICE A WEEK    Vaginal atrophy       Estradiol-Norethindrone Acet 0.5-0.1 MG Tabs     84 tablet    TAKE ONE TABLET BY MOUTH EVERY DAY    Decreased libido       glucose 40 % Gel gel     1 Tube    Take 15-30 g by mouth every 15 minutes as needed.    Chronic pancreatitis (H)       insulin glargine 100 UNIT/ML injection    LANTUS SOLOSTAR    15 mL    Inject 4 Units Subcutaneous every 24 hours    Post-pancreatectomy diabetes (H)       insulin pen needle 32G X 4 MM    BD MAHESH U/F    100 each    Use up to 3 times daily as needed for insulin dosing    Acquired absence of pancreas       levothyroxine 88 MCG tablet    SYNTHROID/LEVOTHROID    90 tablet    Take 1 tablet (88 mcg) by mouth daily    Acquired hypothyroidism       loratadine 10 MG tablet    CLARITIN     Take 10 mg by mouth daily as needed        LORazepam 0.5 MG tablet    ATIVAN    30 tablet    Take 1 tablet (0.5 mg) by mouth every 8 hours as needed for anxiety    Anxiety       modafinil 200 MG tablet    PROVIGIL     Take 2  tablets by mouth Once a Day        MOVANTIK 25 MG Tabs tablet   Generic drug:  naloxegol      Take 25 mg by mouth every morning (before breakfast)        naloxone nasal spray    NARCAN     Spray 4 mg in nostril as needed for opioid reversal        NovoLOG penFILL 100 UNITS/ML injection   Generic drug:  insulin aspart     3 Month    Take as directed    Diabetes mellitus type 1 (H)       NOVOPEN JR (GREEN) Genny   Generic drug:  Injection Device for insulin      1 Device Inject 1 unit (which is marked as a 1/2 unit on the pen itself)  as needed when eating carb heavy meals.        omeprazole 40 MG capsule    priLOSEC    180 capsule    Take 1 capsule (40 mg) by mouth 2 times daily Take 30-60 minutes before a meal.    Gastroesophageal reflux disease without esophagitis, Nausea, Gastric bypass status for obesity       ondansetron 4 MG ODT tab    ZOFRAN ODT    20 tablet    Take 1-2 tablets (4-8 mg) by mouth 3 times daily (before meals)    Nausea       polyethylene glycol Packet    MIRALAX/GLYCOLAX    7 packet    Take 17 g by mouth daily    Constipation       ranitidine 150 MG tablet    ZANTAC    180 tablet    Take 1 tablet (150 mg) by mouth 2 times daily    Nausea       sennosides 8.6 MG tablet    SENOKOT    120 tablet    Take 2 tablets by mouth 2 times daily    Other constipation       Sharps Container Misc     1 each    For insulin needles    Absence of pancreas, acquired       traZODone 50 MG tablet    DESYREL    90 tablet    Take 1 tablet (50 mg) by mouth At Bedtime    Persistent insomnia       VISTARIL PO      Take 25 mg by mouth every 6 hours as needed for itching        * Notice:  This list has 2 medication(s) that are the same as other medications prescribed for you. Read the directions carefully, and ask your doctor or other care provider to review them with you.

## 2018-06-08 LAB
C PEPTIDE SERPL-MCNC: 2 NG/ML (ref 0.9–6.9)
FOLATE RBC-MCNC: 427 NG/ML
HCT VFR BLD CALC: 38.5 %

## 2018-06-09 LAB — ZINC SERPL-MCNC: 91 UG/DL (ref 60–120)

## 2018-06-10 LAB
A-TOCOPHEROL VIT E SERPL-MCNC: 9 MG/L (ref 5.5–18)
ANNOTATION COMMENT IMP: NORMAL
BETA+GAMMA TOCOPHEROL SERPL-MCNC: 1.6 MG/L (ref 0–6)
RETINYL PALMITATE SERPL-MCNC: <0.02 MG/L (ref 0–0.1)
VIT A SERPL-MCNC: 0.47 MG/L (ref 0.3–1.2)

## 2018-06-11 LAB
DEPRECATED CALCIDIOL+CALCIFEROL SERPL-MC: <66 UG/L (ref 20–75)
VITAMIN D2 SERPL-MCNC: <5 UG/L
VITAMIN D3 SERPL-MCNC: 61 UG/L

## 2018-06-12 ENCOUNTER — TELEPHONE (OUTPATIENT)
Dept: FAMILY MEDICINE | Facility: CLINIC | Age: 53
End: 2018-06-12

## 2018-06-12 LAB
A-LINOLENATE SERPL-SCNC: 110 NMOL/ML (ref 50–130)
AA SERPL-SCNC: 1033 NMOL/ML (ref 520–1490)
ARACHIDATE SERPL-SCNC: 43 NMOL/ML (ref 50–90)
CLINICAL BIOCHEMIST REVIEW: ABNORMAL
DHA SERPL-SCNC: 195 NMOL/ML (ref 30–250)
DOCOSAPENTAENATE W6 SERPL-SCNC: 35 NMOL/ML (ref 10–70)
DOCOSATETRAENOATE SERPL-SCNC: 42 NMOL/ML (ref 10–80)
DOCOSENOATE SERPL-SCNC: 9 NMOL/ML (ref 4–13)
DPA SERPL-SCNC: 123 NMOL/ML (ref 20–210)
EPA SERPL-SCNC: 138 NMOL/ML (ref 14–100)
FA SERPL-SCNC: 12.4 MMOL/L (ref 7.3–16.8)
G-LINOLENATE SERPL-SCNC: 126 NMOL/ML (ref 16–150)
HEXADECENOATE SERPL-SCNC: 54 NMOL/ML (ref 25–105)
HOMO-G LINOLENATE SERPL-SCNC: 159 NMOL/ML (ref 50–250)
LAURATE SERPL-SCNC: 21 NMOL/ML (ref 6–90)
LINOLEATE SERPL-SCNC: 3395 NMOL/ML (ref 2270–3850)
MEAD ACID SERPL-SCNC: 38 NMOL/ML (ref 7–30)
MONOUNSAT FA SERPL-SCNC: 3.1 MMOL/L (ref 1.3–5.8)
MYRISTATE SERPL-SCNC: 137 NMOL/ML (ref 30–450)
NERVONATE SERPL-SCNC: 127 NMOL/ML (ref 60–100)
OCTADECANOATE SERPL-SCNC: 1022 NMOL/ML (ref 590–1170)
OLEATE SERPL-SCNC: 2178 NMOL/ML (ref 650–3500)
PALMITATE SERPL-SCNC: 2501 NMOL/ML (ref 1480–3730)
PALMITOLEATE SERPL-SCNC: 367 NMOL/ML (ref 110–1130)
POLYUNSAT FA SERPL-SCNC: 5.4 MMOL/L (ref 3.2–5.8)
SAT FA SERPL-SCNC: 3.9 MMOL/L (ref 2.5–5.5)
TRIENOATE/AA SERPL-SRTO: 0.04 {RATIO} (ref 0.01–0.04)
VACCENATE SERPL-SCNC: 296 NMOL/ML (ref 280–740)
W3 FA SERPL-SCNC: 0.6 MMOL/L (ref 0.2–0.5)
W6 FA SERPL-SCNC: 4.8 MMOL/L (ref 3–5.4)

## 2018-06-12 NOTE — TELEPHONE ENCOUNTER
Cub sent form about pt not being on statin     Routed to PCP for review     Sarika Osullivan RN

## 2018-07-05 ENCOUNTER — ALLIED HEALTH/NURSE VISIT (OUTPATIENT)
Dept: EDUCATION SERVICES | Facility: CLINIC | Age: 53
End: 2018-07-05
Payer: COMMERCIAL

## 2018-07-05 DIAGNOSIS — E89.1 POST-PANCREATECTOMY DIABETES (H): ICD-10-CM

## 2018-07-05 DIAGNOSIS — E13.9 POST-PANCREATECTOMY DIABETES (H): ICD-10-CM

## 2018-07-05 DIAGNOSIS — Z90.410 POST-PANCREATECTOMY DIABETES (H): ICD-10-CM

## 2018-07-05 PROCEDURE — G0108 DIAB MANAGE TRN  PER INDIV: HCPCS | Performed by: DIETITIAN, REGISTERED

## 2018-07-05 NOTE — PROGRESS NOTES
"Diabetes Self Management Training: Individual Review Visit    Lynn Thompson presents today for diabetes education.    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    Current Diabetes Management per Patient:    Diabetes Medication(s)     Diabetic Other Sig    glucose 40 % GEL Take 15-30 g by mouth every 15 minutes as needed.    Insulin Sig    insulin glargine (LANTUS SOLOSTAR) 100 UNIT/ML injection Inject 4 Units Subcutaneous every 24 hours     Patient taking differently:  Inject 4 Units Subcutaneous every evening     NOVOLOG PENFILL SOLN 100 UNIT/ML Take as directed        Taking 5 units Lantus at 7:00 pm. Takes Novolog as needed- has sliding scale directions at home.    Do you have any difficulty affording your medications or glucose monitoring supplies?     Patient glucose self monitoring as follows: before meals  BG meter: did not bring meter to visit   BG results: fasting - 77 to 82 mg/dk. Pre-lunch 113, dinner - can be \"kind of low\"      BG values are: unable to assess  Patient's most recent   Lab Results   Component Value Date    A1C 7.5 06/07/2018    is not meeting goal of <7.0     Nutrition:  Patient eats 3 meals per day and eats smaller portions, hx of gastric bypass, occasional dumping    Breakfast - key lime yogurt (greek), coffee  Lunch - burger- no bun, fruit, cheese,  OR soup and watermelon, bread peanut butter or 2 slices frozen pizza  Dinner - potato, meat loaf, corn   Snack: Drum stick, up to 4 " "popscles per day    Beverages: water    Cultural/Pentecostalism diet restrictions: No     Biggest Challenge to Healthy Eating: sugary snacks    Physical Activity:    Limitations: none  Lawn mowing, kayaking, walks dog    Diabetes Risk Factors:  Post pancreatectomy    Relevant co-morbidities and related health problems:  Significant for:  none    Diabetes Complications:  Acute Complications: At risk for hypoglycemia? yes  Symptoms of low blood sugar? yes  Frequency of hypoglycemia: occasional    Recent health service and resource utilization related to diabetes (hyperglycemia, hypoglycemia, etc):   None    Vitals:  LMP 12/19/2013  Estimated body mass index is 24.92 kg/(m^2) as calculated from the following:    Height as of 6/7/18: 1.626 m (5' 4\").    Weight as of 6/7/18: 65.9 kg (145 lb 3.2 oz).   Last 3 BP:   BP Readings from Last 3 Encounters:   06/07/18 131/70   01/24/18 99/57   01/10/18 110/80       History   Smoking Status     Never Smoker   Smokeless Tobacco     Never Used       Labs:  Lab Results   Component Value Date    A1C 7.5 06/07/2018     Lab Results   Component Value Date     06/07/2018     Lab Results   Component Value Date     01/04/2018     HDL Cholesterol   Date Value Ref Range Status   01/04/2018 95 >49 mg/dL Final   ]  GFR Estimate   Date Value Ref Range Status   06/07/2018 74 >60 mL/min/1.7m2 Final     Comment:     Non  GFR Calc     GFR Estimate If Black   Date Value Ref Range Status   06/07/2018 >90 >60 mL/min/1.7m2 Final     Comment:      GFR Calc     Lab Results   Component Value Date    CR 0.80 06/07/2018     No results found for: MICROALBUMIN    Socio/Economic/Cultural Considerations:    Support system: not assessed    Cultural Influences/Ethnic Background:  American    Health Literacy/Numeracy:  \"With diabetes, it's helpful to use forms and log books to write down blood sugars and what you're eating at times to help understand how foods affect your " blood sugars. With this in mind how confident are you at filling out medical forms, such as these, by yourself?  Not Assessed    Health Beliefs and Attitudes:   Patient Activation Measure Survey Score:  JENNIFER Score (Last Two) 9/2/2011   JENNIFER Raw Score 40   Activation Score 60   JENNIFER Level 3       Stage of Change: ACTION (Actively working towards change)      Diabetes knowledge and skills assessment:     Patient is knowledgeable in diabetes management concepts related to: Being Active, Problem Solving, Reducing Risks and Healthy Coping    Patient needs further education on the following diabetes management concepts: Healthy Eating, Monitoring and Taking Medication    Barriers to Learning Assessment: No Barriers identified    Based on learning assessment above, most appropriate setting for further diabetes education would be: Individual setting.    INTERVENTION:   Education provided today on:  AADE Self-Care Behaviors:  Healthy Eating: carbohydrate counting, consistency in amount, composition, and timing of food intake, plate planning method and balancing carbs with insulin  Monitoring: individual blood glucose targets, frequency of monitoring and potential use of diagnostic or personal CGM  Taking Medication: action of prescribed medication, adjusting insulin doses    Opportunities for ongoing education and support in diabetes-self management were discussed.    Pt verbalized understanding of concepts discussed and recommendations provided today.       Education Materials Provided:  Toni Understanding Diabetes Booklet, Carbohydrate Counting and My Plate PlannerBre brochure    PLAN:  1. Meal Plan Recommendation: eat 3 meals a day, avoid/ limit sugary snacks, Aim for 2 carb choices at meals and 1 carb choice at snacks  2. BG data not available today -  no insulin dose changes made  3. Consider CGM    FOLLOW-UP:  Follow-up as needed.   Chart routed to referring provider.    Ongoing plan for education and support:  Written resources (magazines, books, etc.) and Follow-up with primary care provider    Malina Gaines RD, CDE  Diabetes     Time Spent: 60 minutes  Encounter Type: Individual    Any diabetes medication dose changes were made via the CDE Protocol and Collaborative Practice Agreement with the patient's primary care provider. A copy of this encounter was shared with the provider.

## 2018-07-05 NOTE — LETTER
"    7/5/2018         RE: Lynn Thompson  [address] [facility] [address]        Dear Colleague,    Thank you for referring your patient, Lynn Thompson, to the [facility]. Please see a copy of my visit note below.    Diabetes Self Management Training: Individual Review Visit    Lynn Thompson presents today for diabetes education.    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    Current Diabetes Management per Patient:    Diabetes Medication(s)     Diabetic Other Sig    glucose 40 % GEL Take 15-30 g by mouth every 15 minutes as needed.    Insulin Sig    insulin glargine (LANTUS SOLOSTAR) 100 UNIT/ML injection Inject 4 Units Subcutaneous every 24 hours     Patient taking differently:  Inject 4 Units Subcutaneous every evening     NOVOLOG PENFILL SOLN 100 UNIT/ML Take as directed        Taking 5 units Lantus at 7:00 pm. Takes Novolog as needed- has sliding scale directions at home.    Do you have any difficulty affording your medications or glucose monitoring supplies?     Patient glucose self monitoring as follows: before meals  BG meter: did not bring meter to visit   BG results: fasting - 77 to 82 mg/dk. Pre-lunch 113, dinner - can be \"kind of low\"      BG values are: unable to assess  Patient's most recent   Lab Results   Component Value Date    A1C 7.5 06/07/2018    is not meeting goal of <7.0     Nutrition:  Patient eats 3 meals per day and eats smaller portions, hx " "of gastric bypass, occasional dumping    Breakfast - key lime yogurt (greek), coffee  Lunch - burger- no bun, fruit, cheese,  OR soup and watermelon, bread peanut butter or 2 slices frozen pizza  Dinner - potato, meat loaf, corn   Snack: Drum stick, up to 4 popscles per day    Beverages: water    Cultural/Shinto diet restrictions: No     Biggest Challenge to Healthy Eating: sugary snacks    Physical Activity:    Limitations: none  Lawn mowing, kayaking, walks dog    Diabetes Risk Factors:  Post pancreatectomy    Relevant co-morbidities and related health problems:  Significant for:  none    Diabetes Complications:  Acute Complications: At risk for hypoglycemia? yes  Symptoms of low blood sugar? yes  Frequency of hypoglycemia: occasional    Recent health service and resource utilization related to diabetes (hyperglycemia, hypoglycemia, etc):   None    Vitals:  [facility] 12/19/2013  Estimated body mass index is 24.92 kg/(m^2) as calculated from the following:    Height as of 6/7/18: 1.626 m (5' 4\").    Weight as of 6/7/18: 65.9 kg (145 lb 3.2 oz).   Last 3 BP:   BP Readings from Last 3 Encounters:   06/07/18 131/70   01/24/18 99/57   01/10/18 110/80       History   Smoking Status     Never Smoker   Smokeless Tobacco     Never Used       Labs:  Lab Results   Component Value Date    A1C 7.5 06/07/2018     Lab Results   Component Value Date     06/07/2018     Lab Results   Component Value Date     01/04/2018     HDL Cholesterol   Date Value Ref Range Status   01/04/2018 95 >49 mg/dL Final   ]  GFR Estimate   Date Value Ref Range Status   06/07/2018 74 >60 mL/min/1.7m2 Final     Comment:     Non  GFR Calc     GFR Estimate If Black   Date Value Ref Range Status   06/07/2018 >90 >60 mL/min/1.7m2 Final     Comment:      GFR Calc     Lab Results   Component Value Date    CR 0.80 06/07/2018     No results found for: MICROALBUMIN    Socio/Economic/Cultural Considerations:    Support " "system: not assessed    Cultural Influences/Ethnic Background:  American    Health Literacy/Numeracy:  \"With diabetes, it's helpful to use forms and log books to write down blood sugars and what you're eating at times to help understand how foods affect your blood sugars. With this in mind how confident are you at filling out medical forms, such as these, by yourself?  Not Assessed    Health Beliefs and Attitudes:   Patient Activation Measure Survey Score:  JENNIFER Score (Last Two) 9/2/2011   JENNIFER Raw Score 40   Activation Score 60   JENNIFER Level 3       Stage of Change: ACTION (Actively working towards change)      Diabetes knowledge and skills assessment:     Patient is knowledgeable in diabetes management concepts related to: Being Active, Problem Solving, Reducing Risks and Healthy Coping    Patient needs further education on the following diabetes management concepts: Healthy Eating, Monitoring and Taking Medication    Barriers to Learning Assessment: No Barriers identified    Based on learning assessment above, most appropriate setting for further diabetes education would be: Individual setting.    INTERVENTION:   Education provided today on:  AADE Self-Care Behaviors:  Healthy Eating: carbohydrate counting, consistency in amount, composition, and timing of food intake, plate planning method and balancing carbs with insulin  Monitoring: individual blood glucose targets, frequency of monitoring and potential use of diagnostic or personal CGM  Taking Medication: action of prescribed medication, adjusting insulin doses    Opportunities for ongoing education and support in diabetes-self management were discussed.    Pt verbalized understanding of concepts discussed and recommendations provided today.       Education Materials Provided:  [facility]oni Understanding Diabetes Booklet, Carbohydrate Counting and My Plate PlannerBre brochure    PLAN:  1. Meal Plan Recommendation: eat 3 meals a day, avoid/ limit sugary snacks, " Aim for 2 carb choices at meals and 1 carb choice at snacks  2. BG data not available today -  no insulin dose changes made  3. Consider CGM    FOLLOW-UP:  Follow-up as needed.   Chart routed to referring provider.    Ongoing plan for education and support: Written resources (magazines, books, etc.) and Follow-up with primary care provider    Malina Gaines RD, CDE  Diabetes     Time Spent: 60 minutes  Encounter Type: Individual    Any diabetes medication dose changes were made via the CDE Protocol and Collaborative Practice Agreement with the patient's primary care provider. A copy of this encounter was shared with the provider.

## 2018-07-05 NOTE — MR AVS SNAPSHOT
After Visit Summary   7/5/2018    Lynn Thompson    MRN: [id number]           Patient Information     Date Of Birth          1965        Visit Information        Provider Department      7/5/2018 2:30 PM Malina Gaines [facility]        Today's Diagnoses     Post-pancreatectomy diabetes (H)           Follow-ups after your visit        Your next 10 appointments already scheduled     Sep 20, 2018 10:00 AM CDT   (Arrive by 9:45 AM)   Return [place] with Adriana Robles MD   [facility] ([facility] [place])    [address] [place] [address]  [address]  [facility] [address] [phone]              Who to contact     If you have questions or need follow up information about today's clinic visit or your schedule please contact [facility] directly at [phone].  Normal or non-critical lab and imaging results will be communicated to you by MyChart, letter or phone within 4 business days after the clinic has received the results. If you do not hear from us within 7 days, please contact the clinic through [employer] or phone. If you have a critical or abnormal lab result, we will notify you by phone as soon as possible.  Submit refill requests through [employer] or call your pharmacy and they will forward the refill request to us. Please allow 3 business days for your refill to be completed.          Additional Information About Your Visit        MyChart Information     [employer] gives you secure access to your electronic health record. If you see a primary care provider, you can also send messages to your care team and make appointments. If you have questions, please call your primary care clinic.  If you do not have a primary care provider, please call [phone] and they will assist you.        Care EveryWhere ID     This is your Care EveryWhere ID. This could be used by other organizations to access your  [facility] medical records  [id number]        Your Vitals Were     Last Period                   12/19/2013            Blood Pressure from Last 3 Encounters:   06/07/18 131/70   01/24/18 99/57   01/10/18 110/80    Weight from Last 3 Encounters:   06/07/18 65.9 kg (145 lb 3.2 oz)   01/10/18 65.3 kg (144 lb)   12/07/17 66.1 kg (145 lb 11.2 oz)              We Performed the Following     DIABETES EDUCATION - Individual  []          Today's Medication Changes          These changes are accurate as of 7/5/18 11:59 PM.  If you have any questions, ask your nurse or doctor.               These medicines have changed or have updated prescriptions.        Dose/Directions    busPIRone 15 MG tablet   Commonly known as:  BUSPAR   This may have changed:    - how much to take  - when to take this  - additional instructions   Used for:  Anxiety        Increased dose. 30 mg two times per day.   Quantity:  120 tablet   Refills:  3       insulin glargine 100 UNIT/ML injection   Commonly known as:  LANTUS SOLOSTAR   This may have changed:    - how much to take  - when to take this   Used for:  Post-pancreatectomy diabetes (H)        Dose:  5 Units   Inject 5 Units Subcutaneous every evening   Quantity:  15 mL   Refills:  1            Where to get your medicines      Some of these will need a paper prescription and others can be bought over the counter.  Ask your nurse if you have questions.     You don't need a prescription for these medications     insulin glargine 100 UNIT/ML injection                Primary Care Provider Office Phone # Fax #    Maryana Sivakumar Brooks [phone] [address]  [facility] [id number]        Equal Access to Services     [facility] AH: Hadii kapil mandujano Soyandel, waaxda luqadaha, qaybta kaalmada adewai, albin allen. So [facility] [phone].    ATENCIÓN: Si habla español, tiene a rivera disposición servicios gratuitos de asistencia lingüística. Llame  al [phone].    We comply with applicable federal civil rights laws and [place] laws. We do not discriminate on the basis of race, color, national origin, age, disability, sex, sexual orientation, or gender identity.            Thank you!     Thank you for choosing [facility]  for your care. Our goal is always to provide you with excellent care. Hearing back from our patients is one way we can continue to improve our services. Please take a few minutes to complete the written survey that you may receive in the mail after your visit with us. Thank you!             Your Updated Medication List - Protect others around you: Learn how to safely use, store and throw away your medicines at www.disposemymeds.org.          This list is accurate as of 7/5/18 11:59 PM.  Always use your most recent med list.                   Brand Name Dispense Instructions for use Diagnosis    ACETAMINOPHEN PO      Take 325 mg by mouth every 6 hours as needed for pain        amylase-lipase-protease 13112 units Tabs tablet    VIOKACE    600 tablet    4-5 aps with meals and 1-2 with snacks    Pancreatic insufficiency       blood glucose lancing device     1 each    Use to test blood sugars 6 times daily or as directed.    Absence of pancreas, acquired       blood glucose monitoring test strip    BILL CONTOUR NEXT    2 Box    Check BS 4-6 times a day    Absence of pancreas, acquired       BOOST HIGH PROTEIN Liqd      After above baseline labs are drawn, give: 6 mL/kg to maximum of 360 mL; the beverage is to be consumed within 5 minutes.    Post-pancreatectomy diabetes (H)       * buprenorphine HCl-naloxone HCl 2-0.5 MG per film    SUBOXONE     Place 0.5 Film under the tongue every morning Patient states she takes an 1/8 of a strip daily.        * buprenorphine HCl-naloxone HCl 2-0.5 MG per film    SUBOXONE     Place 0.25 Film under the tongue every evening        busPIRone 15 MG tablet    BUSPAR    120 tablet     Increased dose. 30 mg two times per day.    Anxiety       CLONIDINE HCL PO      Take 0.1 mg by mouth 2 times daily as needed        DOCUSATE SODIUM PO      Take 100 mg by mouth daily        DULoxetine 30 MG EC capsule    CYMBALTA    180 capsule    Take 1 capsule (30 mg) by mouth 2 times daily Fill at patient request.    Anxiety       ESTRACE VAGINAL 0.1 MG/GM cream   Generic drug:  estradiol     42.5 g    PLACE 2 GRAMS VAGINALLY TWICE A WEEK    Vaginal atrophy       Estradiol-Norethindrone Acet 0.5-0.1 MG Tabs     84 tablet    TAKE ONE TABLET BY MOUTH EVERY DAY    Decreased libido       glucose 40 % (400 mg/mL) Gel gel     1 Tube    Take 15-30 g by mouth every 15 minutes as needed.    Chronic pancreatitis (H)       insulin glargine 100 UNIT/ML injection    LANTUS SOLOSTAR    15 mL    Inject 5 Units Subcutaneous every evening    Post-pancreatectomy diabetes (H)       insulin pen needle 32G X 4 MM    BD MAHESH U/F    100 each    Use up to 3 times daily as needed for insulin dosing    Acquired absence of pancreas       levothyroxine 88 MCG tablet    SYNTHROID/LEVOTHROID    90 tablet    Take 1 tablet (88 mcg) by mouth daily    Acquired hypothyroidism       loratadine 10 MG tablet    CLARITIN     Take 10 mg by mouth daily as needed        LORazepam 0.5 MG tablet    ATIVAN    30 tablet    Take 1 tablet (0.5 mg) by mouth every 8 hours as needed for anxiety    Anxiety       modafinil 200 MG tablet    PROVIGIL     Take 2 tablets by mouth Once a Day        MOVANTIK 25 MG Tabs tablet   Generic drug:  naloxegol      Take 25 mg by mouth every morning (before breakfast)        naloxone nasal spray    NARCAN     Spray 4 mg in nostril as needed for opioid reversal        NovoLOG penFILL 100 UNITS/ML injection   Generic drug:  insulin aspart     3 Month    Take as directed    Diabetes mellitus type 1 (H)       NOVOPEN JR (HUNTER) Genny   Generic drug:  Injection Device for insulin      1 Device Inject 1 unit (which is marked as a 1/2  unit on the pen itself)  as needed when eating carb heavy meals.        omeprazole 40 MG capsule    priLOSEC    180 capsule    Take 1 capsule (40 mg) by mouth 2 times daily Take 30-60 minutes before a meal.    Gastroesophageal reflux disease without esophagitis, Nausea, Gastric bypass status for obesity       ondansetron 4 MG ODT tab    ZOFRAN ODT    20 tablet    Take 1-2 tablets (4-8 mg) by mouth 3 times daily (before meals)    Nausea       polyethylene glycol Packet    MIRALAX/GLYCOLAX    7 packet    Take 17 g by mouth daily    Constipation       ranitidine 150 MG tablet    ZANTAC    180 tablet    Take 1 tablet (150 mg) by mouth 2 times daily    Nausea       sennosides 8.6 MG tablet    SENOKOT    120 tablet    Take 2 tablets by mouth 2 times daily    Other constipation       Sharps Container Misc     1 each    For insulin needles    Absence of pancreas, acquired       traZODone 50 MG tablet    DESYREL    90 tablet    Take 1 tablet (50 mg) by mouth At Bedtime    Persistent insomnia       VISTARIL PO      Take 25 mg by mouth every 6 hours as needed for itching        * Notice:  This list has 2 medication(s) that are the same as other medications prescribed for you. Read the directions carefully, and ask your doctor or other care provider to review them with you.

## 2018-07-11 DIAGNOSIS — K86.89 PANCREATIC INSUFFICIENCY: ICD-10-CM

## 2018-08-03 DIAGNOSIS — R68.82 DECREASED LIBIDO: ICD-10-CM

## 2018-08-03 RX ORDER — ESTRADIOL AND NORETHINDRONE ACETATE .5; .1 MG/1; MG/1
TABLET ORAL
Qty: 84 TABLET | Refills: 0 | Status: SHIPPED | OUTPATIENT
Start: 2018-08-03 | End: 2018-10-24

## 2018-08-09 DIAGNOSIS — Z98.84 GASTRIC BYPASS STATUS FOR OBESITY: ICD-10-CM

## 2018-08-09 DIAGNOSIS — K21.9 GASTROESOPHAGEAL REFLUX DISEASE WITHOUT ESOPHAGITIS: ICD-10-CM

## 2018-08-09 DIAGNOSIS — R11.0 NAUSEA: ICD-10-CM

## 2018-08-09 RX ORDER — OMEPRAZOLE 40 MG/1
40 CAPSULE, DELAYED RELEASE ORAL 2 TIMES DAILY
Qty: 180 CAPSULE | Refills: 1 | Status: SHIPPED | OUTPATIENT
Start: 2018-08-09 | End: 2019-02-08

## 2018-08-09 NOTE — TELEPHONE ENCOUNTER
"Requested Prescriptions   Pending Prescriptions Disp Refills     omeprazole (PRILOSEC) 40 MG capsule 180 capsule 1    Last Written Prescription Date:  02/12/2018  Last Fill Quantity: 180 capsule,  # refills: 1   Last office visit: 1/10/2018 with prescribing provider:  01/10/2018   Future Office Visit:     Sig: Take 1 capsule (40 mg) by mouth 2 times daily Take 30-60 minutes before a meal.    PPI Protocol Passed    8/9/2018  8:25 AM       Passed - Not on Clopidogrel (unless Pantoprazole ordered)       Passed - No diagnosis of osteoporosis on record       Passed - Recent (12 mo) or future (30 days) visit within the authorizing provider's specialty    Patient had office visit in the last 12 months or has a visit in the next 30 days with authorizing provider or within the authorizing provider's specialty.  See \"Patient Info\" tab in inbasket, or \"Choose Columns\" in Meds & Orders section of the refill encounter.           Passed - Patient is age 18 or older       Passed - No active pregnacy on record       Passed - No positive pregnancy test in past 12 months          Edy RAMOST  "

## 2018-08-09 NOTE — TELEPHONE ENCOUNTER
Prescription approved per [facility] Refill Protocol.  Neel Zarate RN, BSN

## 2018-08-19 ENCOUNTER — OFFICE VISIT (OUTPATIENT)
Dept: URGENT CARE | Facility: URGENT CARE | Age: 53
End: 2018-08-19
Payer: COMMERCIAL

## 2018-08-19 VITALS
SYSTOLIC BLOOD PRESSURE: 126 MMHG | OXYGEN SATURATION: 97 % | WEIGHT: 145 LBS | DIASTOLIC BLOOD PRESSURE: 74 MMHG | HEART RATE: 72 BPM | TEMPERATURE: 99.4 F | BODY MASS INDEX: 24.89 KG/M2

## 2018-08-19 DIAGNOSIS — J01.90 ACUTE SINUSITIS WITH SYMPTOMS > 10 DAYS: Primary | ICD-10-CM

## 2018-08-19 PROCEDURE — 99213 OFFICE O/P EST LOW 20 MIN: CPT | Performed by: PHYSICIAN ASSISTANT

## 2018-08-19 RX ORDER — CEFUROXIME AXETIL 500 MG/1
500 TABLET ORAL 2 TIMES DAILY
Qty: 20 TABLET | Refills: 0 | Status: SHIPPED | OUTPATIENT
Start: 2018-08-19 | End: 2019-01-22

## 2018-08-19 RX ORDER — GABAPENTIN 300 MG/1
1 CAPSULE ORAL AT BEDTIME
Status: ON HOLD | COMMUNITY
Start: 2018-07-17 | End: 2020-10-29

## 2018-08-19 ASSESSMENT — ENCOUNTER SYMPTOMS
SINUS PAIN: 1
DIARRHEA: 0
SINUS PRESSURE: 1
VOMITING: 0
SORE THROAT: 0
COUGH: 0

## 2018-08-19 NOTE — PROGRESS NOTES
SUBJECTIVE:   Lynn Thompson is a 53 year old female presenting with a chief complaint of   Chief Complaint   Patient presents with     Urgent Care     Sinus Problem       She is an established patient of [facility].    URI Adult    Onset of symptoms was 1 week(s) ago.  Course of illness is worsening.    Severity moderate  Current and Associated symptoms: facial pain/pressure, post nasal drip, congestion.  Treatment measures tried include Antihistamine.  Predisposing factors include None.  No f/c/n/v/d. No cough.       Review of Systems   HENT: Positive for congestion, postnasal drip, sinus pain and sinus pressure. Negative for sore throat.    Respiratory: Negative for cough.    Gastrointestinal: Negative for diarrhea and vomiting.       Past Medical History:   Diagnosis Date     Abdominal pain, epigastric 11/05, ongoing; goes to pain clinic    probable recurrent spasm sphincter of Oddi causing biliary colic pains      Benign paroxysmal positional vertigo     occ.      Calculus of kidney 5/05    x1 on L side passed, several stones.  Has been tested for oxalate.     Chronic abdominal pain 7/17/2013     Chronic pancreatitis (H) 7/17/2013     Depressive disorder, not elsewhere classified     also occ panic spells     Diabetes (H)     post pancreatectomy     Dyspepsia and other specified disorders of function of stomach 6/99    H. 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    [place] [facility] [place]     Spasm of sphincter of Oddi     surgical +  endoscopic stenting of pancreatic duct @ [facility] [place] 06     Thyroid nodule 2016     Ureteral calculus 10/2/2012     Vaccination not carried out      Family History   Problem Relation Age of Onset     Family History Negative Mother      Respiratory Father      COPD;  at 69     Genitourinary Problems Father      kidney stones     Substance Abuse Father      Depression Father      Asthma Father      HEART DISEASE Paternal Grandfather      M.I.     Coronary Artery Disease Paternal Grandfather      Hyperlipidemia Paternal Grandfather      Genitourinary Problems Brother      multiple brothers with kidney stones     GASTROINTESTINAL DISEASE Maternal Grandmother      undiagnosed 'gut' issues     Coronary Artery Disease Maternal Grandfather      Hyperlipidemia Maternal Grandfather      Cerebrovascular Disease Paternal Grandmother      At the age of 103     Anxiety Disorder Paternal Grandmother      Osteoperosis Paternal Grandmother      Anxiety Disorder Son      Anxiety Disorder Daughter      Asthma Daughter      Current Outpatient Prescriptions   Medication Sig Dispense Refill     cefuroxime (CEFTIN) 500 MG tablet Take 1 tablet (500 mg) by mouth 2 times daily 20 tablet 0     gabapentin (NEURONTIN) 300 MG capsule        ACETAMINOPHEN PO Take 325 mg by mouth every 6 hours as needed for pain       amylase-lipase-protease (VIOKACE) 94787 units TABS tablet Take 4-5 caps with meals and 1-2 with snacks 600 tablet 5     blood glucose (BILL MICROLET 2) lancing device Use to test blood sugars 6 times daily or as directed. 1 each 11     blood glucose monitoring (BILL CONTOUR NEXT) test strip Check BS 4-6 times a day 2 Box 6     buprenorphine HCl-naloxone HCl (SUBOXONE) 2-0.5 MG per film Place 0.25 Film under the tongue every evening       buprenorphine HCl-naloxone HCl (SUBOXONE) 2-0.5 MG per film Place 0.5 Film under the tongue every morning Patient states she takes an 1/8 of a strip daily.       busPIRone (BUSPAR) 15 MG  tablet Increased dose. 30 mg two times per day. (Patient taking differently: 15 mg 2 times daily Increased dose. 30 mg two times per day.) 120 tablet 3     CLONIDINE HCL PO Take 0.1 mg by mouth 2 times daily as needed       DOCUSATE SODIUM PO Take 100 mg by mouth daily       DULoxetine (CYMBALTA) 30 MG EC capsule Take 1 capsule (30 mg) by mouth 2 times daily Fill at patient request. 180 capsule 1     ESTRACE VAGINAL 0.1 MG/GM cream PLACE 2 GRAMS VAGINALLY TWICE A WEEK 42.5 g 10     Estradiol-Norethindrone Acet 0.5-0.1 MG TABS TAKE ONE TABLET BY MOUTH DAILY 84 tablet 0     glucose 40 % GEL Take 15-30 g by mouth every 15 minutes as needed. 1 Tube 11     HydrOXYzine Pamoate (VISTARIL PO) Take 25 mg by mouth every 6 hours as needed for itching       Injection Device for insulin (NOVOPEN SnaptHUNTER,) MODESTA 1 Device Inject 1 unit (which is marked as a 1/2 unit on the pen itself)  as needed when eating carb heavy meals.       insulin glargine (LANTUS SOLOSTAR) 100 UNIT/ML pen Inject 5 Units Subcutaneous every evening 15 mL 1     insulin pen needle (BD MAHESH U/F) 32G X 4 MM Use up to 3 times daily as needed for insulin dosing 100 each prn     levothyroxine (SYNTHROID/LEVOTHROID) 88 MCG tablet Take 1 tablet (88 mcg) by mouth daily 90 tablet 1     loratadine (CLARITIN) 10 MG tablet Take 10 mg by mouth daily as needed        LORazepam (ATIVAN) 0.5 MG tablet Take 1 tablet (0.5 mg) by mouth every 8 hours as needed for anxiety 30 tablet 3     modafinil (PROVIGIL) 200 MG tablet Take 2 tablets by mouth Once a Day       naloxegol (MOVANTIK) 25 MG TABS tablet Take 25 mg by mouth every morning (before breakfast)       naloxone (NARCAN) nasal spray Spray 4 mg in nostril as needed for opioid reversal       NOVOLOG PENFILL SOLN 100 UNIT/ML Take as directed 3 Month 1     Nutritional Supplements (BOOST HIGH PROTEIN) LIQD After above baseline labs are drawn, give: 6 mL/kg to maximum of 360 mL; the beverage is to be consumed within 5 minutes.   0     omeprazole (PRILOSEC) 40 MG capsule Take 1 capsule (40 mg) by mouth 2 times daily Take 30-60 minutes before a meal. 180 capsule 1     ondansetron (ZOFRAN ODT) 4 MG ODT tab Take 1-2 tablets (4-8 mg) by mouth 3 times daily (before meals) 20 tablet 3     polyethylene glycol (MIRALAX/GLYCOLAX) packet Take 17 g by mouth daily 7 packet 0     ranitidine (ZANTAC) 150 MG tablet Take 1 tablet (150 mg) by mouth 2 times daily 180 tablet 2     sennosides (SENOKOT) 8.6 MG tablet Take 2 tablets by mouth 2 times daily 120 tablet 1     Sharps Container MISC For insulin needles 1 each prn     traZODone (DESYREL) 50 MG tablet Take 1 tablet (50 mg) by mouth At Bedtime 90 tablet 3     Social History   Substance Use Topics     Smoking status: Never Smoker     Smokeless tobacco: Never Used     Alcohol use No       OBJECTIVE  /74  Pulse 72  Temp 99.4  F (37.4  C) (Oral)  Wt 145 lb (65.8 kg)  LMP 12/19/2013  SpO2 97%  BMI 24.89 kg/m2    Physical Exam   Constitutional: She appears well-developed and well-nourished. No distress.   HENT:   Head: Normocephalic and atraumatic.   Right Ear: Tympanic membrane and external ear normal.   Left Ear: Tympanic membrane and external ear normal.   Mouth/Throat: Oropharynx is clear and moist.   Maxillary sinuses are tender to percussion   Eyes: Conjunctivae and EOM are normal.   Neck: Normal range of motion. Neck supple.   Cardiovascular: Regular rhythm and normal heart sounds.    Pulmonary/Chest: Effort normal and breath sounds normal. No respiratory distress.   Neurological: She is alert.   Skin: Skin is warm and dry. No rash noted.   Psychiatric: She has a normal mood and affect.       Labs:  No results found. However, due to the size of the patient record, not all encounters were searched. Please check Results Review for a complete set of results.        ASSESSMENT:      ICD-10-CM    1. Acute sinusitis with symptoms > 10 days J01.90 cefuroxime (CEFTIN) 500 MG tablet        Medical  Decision Making:    Differential Diagnosis:  URI Adult/Peds:  Sinusitis and Viral syndrome    Serious Comorbid Conditions:  Adult:  Post pancreatectomy diabetes    PLAN:  Acute sinusitis: Ceftin Rx. Follow up if any worsening symptoms. Patient agrees.     Followup:    If not improving or if condition worsens, follow up with your Primary Care Provider

## 2018-08-20 DIAGNOSIS — R11.0 NAUSEA: ICD-10-CM

## 2018-08-20 RX ORDER — ONDANSETRON 4 MG/1
4-8 TABLET, ORALLY DISINTEGRATING ORAL
Qty: 20 TABLET | Refills: 3 | Status: ON HOLD | OUTPATIENT
Start: 2018-08-20 | End: 2020-06-11

## 2018-08-20 NOTE — TELEPHONE ENCOUNTER
"Requested Prescriptions   Pending Prescriptions Disp Refills     ondansetron (ZOFRAN ODT) 4 MG ODT tab 20 tablet 3    Last Written Prescription Date:  11/17/2017  Last Fill Quantity: 20 tablet,  # refills: 3   Last office visit: 1/10/2018 with prescribing provider:  01/10/2018   Future Office Visit:   Next 5 appointments (look out 90 days)     Aug 23, 2018  2:40 PM CDT   Mishel Barker with Maryana Brooks MD   [facility] ([facility]    [address] [place] [id number] [phone]                  Sig: Take 1-2 tablets (4-8 mg) by mouth 3 times daily (before meals)     Antivertigo/Antiemetic Agents Passed    8/20/2018  7:57 AM       Passed - Recent (12 mo) or future (30 days) visit within the authorizing provider's specialty    Patient had office visit in the last 12 months or has a visit in the next 30 days with authorizing provider or within the authorizing provider's specialty.  See \"Patient Info\" tab in inbasket, or \"Choose Columns\" in Meds & Orders section of the refill encounter.           Passed - Patient is 18 years of age or older          Edy RAMOST  "

## 2018-08-22 ENCOUNTER — TELEPHONE (OUTPATIENT)
Dept: FAMILY MEDICINE | Facility: CLINIC | Age: 53
End: 2018-08-22

## 2018-08-22 DIAGNOSIS — Z90.410 POST-PANCREATECTOMY DIABETES (H): Primary | ICD-10-CM

## 2018-08-22 DIAGNOSIS — E13.9 POST-PANCREATECTOMY DIABETES (H): Primary | ICD-10-CM

## 2018-08-22 DIAGNOSIS — E89.1 POST-PANCREATECTOMY DIABETES (H): Primary | ICD-10-CM

## 2018-08-22 NOTE — TELEPHONE ENCOUNTER
Panel Management Review      Patient has the following on her problem list: None      Composite cancer screening  Chart review shows that this patient is due/due soon for the following None  Summary:    Patient is due/failing the following:   Fasting cholesterol and A1C, DM    Action needed:   Patient needs office visit for DM.    Type of outreach:    Phone, spoke to patient.  pt coming in tomorrow for fasting labs and will see Dr. Brooks at 2:40    Questions for provider review:    Pt is coming in the morning for fasting labs before her appt at 2:40. can you please put orders in                                                                                                                                     NATALIE Bates       Chart routed to Provider .

## 2018-08-23 ENCOUNTER — OFFICE VISIT (OUTPATIENT)
Dept: FAMILY MEDICINE | Facility: CLINIC | Age: 53
End: 2018-08-23
Payer: COMMERCIAL

## 2018-08-23 ENCOUNTER — RADIANT APPOINTMENT (OUTPATIENT)
Dept: GENERAL RADIOLOGY | Facility: CLINIC | Age: 53
End: 2018-08-23
Attending: FAMILY MEDICINE
Payer: COMMERCIAL

## 2018-08-23 VITALS
OXYGEN SATURATION: 97 % | HEART RATE: 62 BPM | DIASTOLIC BLOOD PRESSURE: 80 MMHG | WEIGHT: 146.1 LBS | SYSTOLIC BLOOD PRESSURE: 117 MMHG | TEMPERATURE: 98.6 F | BODY MASS INDEX: 25.08 KG/M2

## 2018-08-23 DIAGNOSIS — M79.672 LEFT FOOT PAIN: ICD-10-CM

## 2018-08-23 DIAGNOSIS — E89.1 POST-PANCREATECTOMY DIABETES (H): ICD-10-CM

## 2018-08-23 DIAGNOSIS — M79.672 LEFT FOOT PAIN: Primary | ICD-10-CM

## 2018-08-23 DIAGNOSIS — Z90.410 POST-PANCREATECTOMY DIABETES (H): ICD-10-CM

## 2018-08-23 DIAGNOSIS — E13.9 POST-PANCREATECTOMY DIABETES (H): ICD-10-CM

## 2018-08-23 LAB
CHOLEST SERPL-MCNC: 205 MG/DL
HDLC SERPL-MCNC: 79 MG/DL
LDLC SERPL CALC-MCNC: 108 MG/DL
NONHDLC SERPL-MCNC: 126 MG/DL
TRIGL SERPL-MCNC: 91 MG/DL

## 2018-08-23 PROCEDURE — 36415 COLL VENOUS BLD VENIPUNCTURE: CPT | Performed by: FAMILY MEDICINE

## 2018-08-23 PROCEDURE — 73630 X-RAY EXAM OF FOOT: CPT | Mod: LT

## 2018-08-23 PROCEDURE — 99214 OFFICE O/P EST MOD 30 MIN: CPT | Performed by: FAMILY MEDICINE

## 2018-08-23 PROCEDURE — 80061 LIPID PANEL: CPT | Performed by: FAMILY MEDICINE

## 2018-08-23 RX ORDER — ESCITALOPRAM OXALATE 10 MG/1
20 TABLET ORAL
Refills: 2 | Status: ON HOLD | COMMUNITY
Start: 2018-08-01 | End: 2020-06-11

## 2018-08-23 NOTE — PROGRESS NOTES
SUBJECTIVE:   Lynn Thompson is a 53 year old female who presents to clinic today for the following health issues:      Musculoskeletal problem/pain      Duration: 6 months or longer     Description  Location: on the top of the left foot and through the bottom    Intensity:  moderate    Accompanying signs and symptoms: tingling and throbs    History  Previous similar problem: no   Previous evaluation:  none    Precipitating or alleviating factors:  Trauma or overuse: no   Aggravating factors include: standing, walking, climbing stairs and overuse    Therapies tried and outcome: ice and changed shoes      Hx of ganglion cyst of the top of the left foot, right where she is having her pain.   Pain is worsening, icing nightly. No swelling. Wearing compressive shoes also causes more pain.       Problem list and histories reviewed & adjusted, as indicated.  Additional history: none    Patient Active Problem List   Diagnosis     Iron deficiency anemia secondary to inadequate dietary iron intake     Gastric bypass status for obesity     Health Care Home     Chronic pain syndrome     Exocrine pancreatic insufficiency     Asplenia     BLU (obstructive sleep apnea)     S/P exploratory laparotomy     Dysthymia     Anxiety     Thyroid nodule     Acquired hypothyroidism     Post-pancreatectomy diabetes (H)     E coli bacteremia     Antibiotic-associated diarrhea     Elevated LFTs     Past Surgical History:   Procedure Laterality Date     APPENDECTOMY       C NONSPECIFIC PROCEDURE      R bunion     C NONSPECIFIC PROCEDURE      T & A     C NONSPECIFIC PROCEDURE      partial ileum resection     C NONSPECIFIC PROCEDURE      R ovarian cyst     C NONSPECIFIC PROCEDURE           C NONSPECIFIC PROCEDURE      GALL BLADDER     C NONSPECIFIC PROCEDURE      CBD stent; Dr. Fernandez MAY NONSPECIFIC PROCEDURE   &     colonoscopy     C NONSPECIFIC PROCEDURE      Gastric bypass      CHOLECYSTECTOMY       COLONOSCOPY       COMBINED CYSTOSCOPY, RETROGRADES, URETEROSCOPY, LASER HOLMIUM LITHOTRIPSY URETER(S), INSERT STENT Right 3/23/2015    Procedure: COMBINED CYSTOSCOPY, RETROGRADES, URETEROSCOPY, LASER HOLMIUM LITHOTRIPSY URETER(S), INSERT STENT;  Surgeon: Kennedi Aldana MD;  Location: [facility]     COMBINED CYSTOSCOPY, RETROGRADES, URETEROSCOPY, LASER HOLMIUM LITHOTRIPSY URETER(S), INSERT STENT Right 4/20/2015    Procedure: COMBINED CYSTOSCOPY, RETROGRADES, URETEROSCOPY, LASER HOLMIUM LITHOTRIPSY URETER(S), INSERT STENT;  Surgeon: Kennedi Aldana MD;  Location: [facility] OR     COSMETIC SURGERY  6/24/2002    Tummy tuck     CYSTOSCOPY, RETROGRADES, INSERT STENT URETER(S), COMBINED  10/2/2012    Procedure: COMBINED CYSTOSCOPY, RETROGRADES, INSERT STENT URETER(S);  COMBINED CYSTOSCOPY,  , INSERT LEFT STENT URETER;  Surgeon: Johny Baez MD;  Location: [facility] OR     ENT SURGERY       ESOPHAGOSCOPY, GASTROSCOPY, DUODENOSCOPY (EGD), COMBINED N/A 1/24/2018    Procedure: COMBINED ESOPHAGOSCOPY, GASTROSCOPY, DUODENOSCOPY (EGD);  ESOPHAGOSCOPY, GASTROSCOPY, DUODENOSCOPY (EGD)    ;  Surgeon: Tamir Rodgers MD;  Location:  GI     EXTRACORPOREAL SHOCK WAVE LITHOTRIPSY (ESWL)  10/16/2012    Procedure: EXTRACORPOREAL SHOCK WAVE LITHOTRIPSY (ESWL);  left EXTRACORPOREAL SHOCK WAVE LITHOTRIPSY (ESWL) ;  Surgeon: Johny Baez MD;  Location: [facility] OR     GI SURGERY  12/29/2015    Small bowel obstruction     HC LAP,LYSIS OF ADHESIONS       HERNIA REPAIR  2/2015     LAPAROSCOPIC LYSIS ADHESIONS N/A 2/20/2015    Procedure: LAPAROSCOPIC LYSIS ADHESIONS;  Surgeon: Aaron Early MD;  Location: [facility]     LAPAROSCOPIC LYSIS ADHESIONS N/A 12/29/2015    Procedure: LAPAROSCOPIC LYSIS ADHESIONS;  Surgeon: Aaron Early MD;  Location: [facility] OR     PANCREATECTOMY, TRANSPLANT AUTO ISLET CELL, COMBINED  5/10/2013    Procedure: COMBINED PANCREATECTOMY, TRANSPLANT AUTO ISLET CELL;  Pancreatectomy, Auto Islet Cell  Transplant   hernia repair, jejunostomy tube and liver biopsies with Anesthesia General with block;  Surgeon: Aaron Early MD;  Location: [facility] OR     TRANSPLANT  5/10/13       Social History   Substance Use Topics     Smoking status: Never Smoker     Smokeless tobacco: Never Used     Alcohol use No     Family History   Problem Relation Age of Onset     Family History Negative Mother      Respiratory Father      COPD;  at 69     Genitourinary Problems Father      kidney stones     Substance Abuse Father      Depression Father      Asthma Father      HEART DISEASE Paternal Grandfather      M.I.     Coronary Artery Disease Paternal Grandfather      Hyperlipidemia Paternal Grandfather      Genitourinary Problems Brother      multiple brothers with kidney stones     GASTROINTESTINAL DISEASE Maternal Grandmother      undiagnosed 'gut' issues     Coronary Artery Disease Maternal Grandfather      Hyperlipidemia Maternal Grandfather      Cerebrovascular Disease Paternal Grandmother      At the age of 103     Anxiety Disorder Paternal Grandmother      Osteoperosis Paternal Grandmother      Anxiety Disorder Son      Anxiety Disorder Daughter      Asthma Daughter            Reviewed and updated as needed this visit by clinical staff       Reviewed and updated as needed this visit by Provider         ROS:  Constitutional, HEENT, cardiovascular, pulmonary, gi and gu systems are negative, except as otherwise noted.    OBJECTIVE:     /80 (BP Location: Right arm, Patient Position: Chair, Cuff Size: Adult Regular)  Pulse 62  Temp 98.6  F (37  C) (Oral)  Wt 146 lb 1.6 oz (66.3 kg)  LMP 2013  SpO2 97%  BMI 25.08 kg/m2  Body mass index is 25.08 kg/(m^2).  GENERAL: healthy, alert and no distress  MS: small palpable cyst on the top of the foot, overlying the cuboid bone-talus junction, ttp in this area    Diagnostic Test Results:  Right foot XR - question arthritis of the ankle bones, no acute  findings    ASSESSMENT/PLAN:     1. Left foot pain - unremarkable XR today, although arthritis could contribute to her pain, consider ganglion cyst as well, suggested podiatry consultation to further evaluate  - XR Foot Left G/E 3 Views; Future  - PODIATRY/FOOT & ANKLE SURGERY REFERRAL      Maryana Brooks MD  [facility]

## 2018-08-23 NOTE — MR AVS SNAPSHOT
After Visit Summary   8/23/2018    Lynn Thompson    MRN: [id number]           Patient Information     Date Of Birth          1965        Visit Information        Provider Department      8/23/2018 2:40 PM Maryana Brooks MD [facility]        Today's Diagnoses     Left foot pain    -  1       Follow-ups after your visit        Additional Services     PODIATRY/FOOT & ANKLE SURGERY REFERRAL       Your provider has referred you to: [facility]: [facility] [phone]   [email or web address]    Please be aware that coverage of these services is subject to the terms and limitations of your health insurance plan.  Call member services at your health plan with any benefit or coverage questions.      Please bring the following to your appointment:  >>   Any x-rays, CTs or MRIs which have been performed.  Contact the facility where they were done to arrange for  prior to your scheduled appointment.    >>   List of current medications   >>   This referral request   >>   Any documents/labs given to you for this referral                  Follow-up notes from your care team     Return in about 1 year (around 8/23/2019) for Yearly Physical Exam.      Your next 10 appointments already scheduled     Sep 20, 2018 10:00 AM CDT   (Arrive by 9:45 AM)   Return Auto [facility] with Adriana Robles MD   [facility] ([facility])    [address] [place] [id number] [phone]              Future tests that were ordered for you today     Open Future Orders        Priority Expected Expires Ordered    Albumin Random Urine Quantitative with Creat Ratio Routine  8/23/2019 8/23/2018            Who to contact     If you have questions or need follow up information about today's clinic visit or your schedule please contact [facility] directly at [phone].  Normal or  non-critical lab and imaging results will be communicated to you by [employer], letter or phone within 4 business days after the clinic has received the results. If you do not hear from us within 7 days, please contact the clinic through [email or web address] or phone. If you have a critical or abnormal lab result, we will notify you by phone as soon as possible.  Submit refill requests through [email or web address] or call your pharmacy and they will forward the refill request to us. Please allow 3 business days for your refill to be completed.          Additional Information About Your Visit        [email or web address] Information     [email or web address] gives you secure access to your electronic health record. If you see a primary care provider, you can also send messages to your care team and make appointments. If you have questions, please call your primary care clinic.  If you do not have a primary care provider, please call [phone] and they will assist you.        Care EveryWhere ID     This is your Care EveryWhere ID. This could be used by other organizations to access your [facility] medical records  [id number]        Your Vitals Were     Pulse Temperature Last Period Pulse Oximetry BMI (Body Mass Index)       62 98.6  F (37  C) (Oral) 12/19/2013 97% 25.08 kg/m2        Blood Pressure from Last 3 Encounters:   08/23/18 117/80   08/19/18 126/74   06/07/18 131/70    Weight from Last 3 Encounters:   08/23/18 146 lb 1.6 oz (66.3 kg)   08/19/18 145 lb (65.8 kg)   06/07/18 145 lb 3.2 oz (65.9 kg)              We Performed the Following     PODIATRY/FOOT & ANKLE SURGERY REFERRAL          Today's Medication Changes          These changes are accurate as of 8/23/18  3:18 PM.  If you have any questions, ask your nurse or doctor.               These medicines have changed or have updated prescriptions.        Dose/Directions    busPIRone 15 MG tablet   Commonly known as:  BUSPAR   This may have changed:    - how much to take  - when to take this  - additional  instructions   Used for:  Anxiety        Increased dose. 30 mg two times per day.   Quantity:  120 tablet   Refills:  3                Primary Care Provider Office Phone # Fax #    Maryana Sivakumar Brooks [phone] [id number]  [facility] [address]        Equal Access to Services     CHAD SOLANO : Hadii aad ku hadasho Soomaali, waaxda luqadaha, qaybta kaalmada adeegyada, waxay idiin hayaan adeeg shahla lamaximen ah. So [facility] [phone].    ATENCIÓN: Si habla español, tiene a rivera disposición servicios gratuitos de asistencia lingüística. Juvencio al [phone].    We comply with applicable federal civil rights laws and [place] laws. We do not discriminate on the basis of race, color, national origin, age, disability, sex, sexual orientation, or gender identity.            Thank you!     Thank you for choosing [facility]  for your care. Our goal is always to provide you with excellent care. Hearing back from our patients is one way we can continue to improve our services. Please take a few minutes to complete the written survey that you may receive in the mail after your visit with us. Thank you!             Your Updated Medication List - Protect others around you: Learn how to safely use, store and throw away your medicines at www.disposemymeds.org.          This list is accurate as of 8/23/18  3:18 PM.  Always use your most recent med list.                   Brand Name Dispense Instructions for use Diagnosis    ACETAMINOPHEN PO      Take 325 mg by mouth every 6 hours as needed for pain        amylase-lipase-protease 10788 units Tabs tablet    VIOKACE    600 tablet    Take 4-5 caps with meals and 1-2 with snacks    Pancreatic insufficiency       blood glucose lancing device     1 each    Use to test blood sugars 6 times daily or as directed.    Absence of pancreas, acquired       * blood glucose monitoring test strip    BILL CONTOUR NEXT    2 Box    Check BS 4-6 times a day     Absence of pancreas, acquired       * blood glucose monitoring test strip    no brand specified      Left foot pain       BOOST HIGH PROTEIN Liqd      After above baseline labs are drawn, give: 6 mL/kg to maximum of 360 mL; the beverage is to be consumed within 5 minutes.    Post-pancreatectomy diabetes (H)       * buprenorphine HCl-naloxone HCl 2-0.5 MG per film    SUBOXONE     Place 0.5 Film under the tongue every morning Patient states she takes an 1/8 of a strip daily.        * buprenorphine HCl-naloxone HCl 2-0.5 MG per film    SUBOXONE     Place 0.25 Film under the tongue every evening        busPIRone 15 MG tablet    BUSPAR    120 tablet    Increased dose. 30 mg two times per day.    Anxiety       cefuroxime 500 MG tablet    CEFTIN    20 tablet    Take 1 tablet (500 mg) by mouth 2 times daily    Acute sinusitis with symptoms > 10 days       CLONIDINE HCL PO      Take 0.1 mg by mouth 2 times daily as needed        DOCUSATE SODIUM PO      Take 100 mg by mouth daily        DULoxetine 30 MG EC capsule    CYMBALTA    180 capsule    Take 1 capsule (30 mg) by mouth 2 times daily Fill at patient request.    Anxiety       escitalopram 10 MG tablet    LEXAPRO      Left foot pain       ESTRACE VAGINAL 0.1 MG/GM cream   Generic drug:  estradiol     42.5 g    PLACE 2 GRAMS VAGINALLY TWICE A WEEK    Vaginal atrophy       Estradiol-Norethindrone Acet 0.5-0.1 MG Tabs     84 tablet    TAKE ONE TABLET BY MOUTH DAILY    Decreased libido       gabapentin 300 MG capsule    NEURONTIN          glucose 40 % (400 mg/mL) Gel gel     1 Tube    Take 15-30 g by mouth every 15 minutes as needed.    Chronic pancreatitis (H)       insulin glargine 100 UNIT/ML injection    LANTUS SOLOSTAR    15 mL    Inject 5 Units Subcutaneous every evening    Post-pancreatectomy diabetes (H)       insulin pen needle 32G X 4 MM    BD MAHESH U/F    100 each    Use up to 3 times daily as needed for insulin dosing    Acquired absence of pancreas        levothyroxine 88 MCG tablet    SYNTHROID/LEVOTHROID    90 tablet    Take 1 tablet (88 mcg) by mouth daily    Acquired hypothyroidism       loratadine 10 MG tablet    CLARITIN     Take 10 mg by mouth daily as needed        LORazepam 0.5 MG tablet    ATIVAN    30 tablet    Take 1 tablet (0.5 mg) by mouth every 8 hours as needed for anxiety    Anxiety       modafinil 200 MG tablet    PROVIGIL     Take 2 tablets by mouth Once a Day        MOVANTIK 25 MG Tabs tablet   Generic drug:  naloxegol      Take 25 mg by mouth every morning (before breakfast)        naloxone nasal spray    NARCAN     Spray 4 mg in nostril as needed for opioid reversal        NovoLOG penFILL 100 UNITS/ML injection   Generic drug:  insulin aspart     3 Month    Take as directed    Diabetes mellitus type 1 (H)       NOVOPEN JR (HUNTER) Genny   Generic drug:  Injection Device for insulin      1 Device Inject 1 unit (which is marked as a 1/2 unit on the pen itself)  as needed when eating carb heavy meals.        omeprazole 40 MG capsule    priLOSEC    180 capsule    Take 1 capsule (40 mg) by mouth 2 times daily Take 30-60 minutes before a meal.    Gastroesophageal reflux disease without esophagitis, Nausea, Gastric bypass status for obesity       ondansetron 4 MG ODT tab    ZOFRAN ODT    20 tablet    Take 1-2 tablets (4-8 mg) by mouth 3 times daily (before meals)    Nausea       polyethylene glycol Packet    MIRALAX/GLYCOLAX    7 packet    Take 17 g by mouth daily    Constipation       ranitidine 150 MG tablet    ZANTAC    180 tablet    Take 1 tablet (150 mg) by mouth 2 times daily    Nausea       sennosides 8.6 MG tablet    SENOKOT    120 tablet    Take 2 tablets by mouth 2 times daily    Other constipation       Sharps Container Misc     1 each    For insulin needles    Absence of pancreas, acquired       traZODone 50 MG tablet    DESYREL    90 tablet    Take 1 tablet (50 mg) by mouth At Bedtime    Persistent insomnia       VISTARIL PO      Take 25 mg  by mouth every 6 hours as needed for itching        * Notice:  This list has 4 medication(s) that are the same as other medications prescribed for you. Read the directions carefully, and ask your doctor or other care provider to review them with you.

## 2018-08-31 ENCOUNTER — OFFICE VISIT (OUTPATIENT)
Dept: PODIATRY | Facility: CLINIC | Age: 53
End: 2018-08-31
Payer: COMMERCIAL

## 2018-08-31 VITALS
DIASTOLIC BLOOD PRESSURE: 72 MMHG | SYSTOLIC BLOOD PRESSURE: 112 MMHG | WEIGHT: 146.1 LBS | HEIGHT: 64 IN | BODY MASS INDEX: 24.94 KG/M2

## 2018-08-31 DIAGNOSIS — M19.072 ARTHRITIS OF LEFT FOOT: Primary | ICD-10-CM

## 2018-08-31 PROCEDURE — 99243 OFF/OP CNSLTJ NEW/EST LOW 30: CPT | Performed by: PODIATRIST

## 2018-08-31 NOTE — LETTER
"    8/31/2018         RE: Lynn Thompson  [address] [address] [facility] [id number]        Dear Colleague,    Thank you for referring your patient, Lynn Thompson, to the [facility]. Please see a copy of my visit note below.    Foot & Ankle Surgery  August 31, 2018    CC: \"left foot pain\"    I was asked to see Lynn Thompson regarding the chief complaint by:  Dr. TAYLOR Brooks    HPI:  Pt is a 53 year old female who presents with above complaint.  Left foot pain x \"years\", describes a deep ache and throbbing pain.  Pain 8/10 daily.  She has tried arch supports, biofreeze, wearing shoes around the house, ice.  She had a previous injection in the 1990s for left midfoot pain    ROS:   Pos for CC.  The patient denies current nausea, vomiting, chills, fevers, belly pain, calf pain, chest pain or SOB.  Complete remainder of ROS is otherwise neg.    VITALS:    Vitals:    08/31/18 0848   BP: 112/72   Weight: 146 lb 1.6 oz (66.3 kg)   Height: 5' 4\" (1.626 m)       PMH:    Past Medical History:   Diagnosis Date     Abdominal pain, epigastric 11/05, ongoing; goes to pain clinic    probable recurrent spasm sphincter of Oddi causing biliary colic pains      Benign paroxysmal positional vertigo     occ.      Calculus of kidney 5/05    x1 on L side passed, several stones.  Has been tested for oxalate.     Chronic abdominal pain 7/17/2013     Chronic pancreatitis (H) 7/17/2013     Depressive disorder, not elsewhere classified     also occ panic spells     Diabetes (H)     post pancreatectomy     Dyspepsia and other specified disorders of function of stomach 6/99    H. 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     Pyelonephritis, unspecified 5/04, 10/8    L " side     Regional enteritis of unspecified site     inacive/remission     Sleep apnea     uses Cpap     Spasm of sphincter of Oddi     ERCP with Stent of CBD by Fernandez @ [facility] with sx relief     Spasm of sphincter of Oddi     surgical + endoscopic stenting of pancreatic duct @ [facility] 06     Thyroid nodule 2016     Ureteral calculus 10/2/2012     Vaccination not carried out        SXHX:    Past Surgical History:   Procedure Laterality Date     APPENDECTOMY       C NONSPECIFIC PROCEDURE      R bunion     C NONSPECIFIC PROCEDURE      T & A     C NONSPECIFIC PROCEDURE      partial ileum resection     C NONSPECIFIC PROCEDURE      R ovarian cyst     C NONSPECIFIC PROCEDURE           C NONSPECIFIC PROCEDURE      GALL BLADDER     C NONSPECIFIC PROCEDURE      CBD stent; Dr. Presley     C NONSPECIFIC PROCEDURE   &     colonoscopy     C NONSPECIFIC PROCEDURE      Gastric bypass     CHOLECYSTECTOMY       COLONOSCOPY       COMBINED CYSTOSCOPY, RETROGRADES, URETEROSCOPY, LASER HOLMIUM LITHOTRIPSY URETER(S), INSERT STENT Right 3/23/2015    Procedure: COMBINED CYSTOSCOPY, RETROGRADES, URETEROSCOPY, LASER HOLMIUM LITHOTRIPSY URETER(S), INSERT STENT;  Surgeon: Kennedi Aldana MD;  Location: [facility] OR     COMBINED CYSTOSCOPY, RETROGRADES, URETEROSCOPY, LASER HOLMIUM LITHOTRIPSY URETER(S), INSERT STENT Right 2015    Procedure: COMBINED CYSTOSCOPY, RETROGRADES, URETEROSCOPY, LASER HOLMIUM LITHOTRIPSY URETER(S), INSERT STENT;  Surgeon: Kennedi Aldana MD;  Location: [facility] OR     COSMETIC SURGERY  2002    Tummy tuck     CYSTOSCOPY, RETROGRADES, INSERT STENT URETER(S), COMBINED  10/2/2012    Procedure: COMBINED CYSTOSCOPY, RETROGRADES, INSERT STENT URETER(S);  COMBINED CYSTOSCOPY,  , INSERT LEFT STENT URETER;  Surgeon: Johny Baez MD;  Location: [facility] OR     ENT SURGERY       ESOPHAGOSCOPY, GASTROSCOPY, DUODENOSCOPY (EGD), COMBINED N/A 2018     Procedure: COMBINED ESOPHAGOSCOPY, GASTROSCOPY, DUODENOSCOPY (EGD);  ESOPHAGOSCOPY, GASTROSCOPY, DUODENOSCOPY (EGD)    ;  Surgeon: Tamir Rodgers MD;  Location: [facility] GI     EXTRACORPOREAL SHOCK WAVE LITHOTRIPSY (ESWL)  10/16/2012    Procedure: EXTRACORPOREAL SHOCK WAVE LITHOTRIPSY (ESWL);  left EXTRACORPOREAL SHOCK WAVE LITHOTRIPSY (ESWL) ;  Surgeon: Johny Baez MD;  Location: [facility] OR     GI SURGERY  12/29/2015    Small bowel obstruction     HC LAP,LYSIS OF ADHESIONS       HERNIA REPAIR  2/2015     LAPAROSCOPIC LYSIS ADHESIONS N/A 2/20/2015    Procedure: LAPAROSCOPIC LYSIS ADHESIONS;  Surgeon: Aaron Early MD;  [facility]     LAPAROSCOPIC LYSIS ADHESIONS N/A 12/29/2015    Procedure: LAPAROSCOPIC LYSIS ADHESIONS;  Surgeon: Aaron Early MD;  [facility] OR     PANCREATECTOMY, TRANSPLANT AUTO ISLET CELL, COMBINED  5/10/2013    Procedure: COMBINED PANCREATECTOMY, TRANSPLANT AUTO ISLET CELL;  Pancreatectomy, Auto Islet Cell Transplant   hernia repair, jejunostomy tube and liver biopsies with Anesthesia General with block;  Surgeon: Aaron Early MD;  Location[facility] OR     TRANSPLANT  5/10/13        MEDS:    Current Outpatient Prescriptions   Medication     ACETAMINOPHEN PO     amylase-lipase-protease (VIOKACE) 39376 units TABS tablet     blood glucose (BILL MICROLET 2) lancing device     blood glucose monitoring (BILL CONTOUR NEXT) test strip     blood glucose monitoring (NO BRAND SPECIFIED) test strip     buprenorphine HCl-naloxone HCl (SUBOXONE) 2-0.5 MG per film     buprenorphine HCl-naloxone HCl (SUBOXONE) 2-0.5 MG per film     busPIRone (BUSPAR) 15 MG tablet     cefuroxime (CEFTIN) 500 MG tablet     CLONIDINE HCL PO     DOCUSATE SODIUM PO     DULoxetine (CYMBALTA) 30 MG EC capsule     escitalopram (LEXAPRO) 10 MG tablet     ESTRACE VAGINAL 0.1 MG/GM cream     Estradiol-Norethindrone Acet 0.5-0.1 MG TABS     gabapentin (NEURONTIN) 300 MG capsule     glucose 40 % GEL     HydrOXYzine Pamoate  (VISTARIL PO)     Injection Device for insulin (NOVOPEN JR, HUNTER,) MODESTA     insulin glargine (LANTUS SOLOSTAR) 100 UNIT/ML pen     insulin pen needle (BD MAHESH U/F) 32G X 4 MM     levothyroxine (SYNTHROID/LEVOTHROID) 88 MCG tablet     loratadine (CLARITIN) 10 MG tablet     LORazepam (ATIVAN) 0.5 MG tablet     modafinil (PROVIGIL) 200 MG tablet     naloxegol (MOVANTIK) 25 MG TABS tablet     naloxone (NARCAN) nasal spray     NOVOLOG PENFILL SOLN 100 UNIT/ML     Nutritional Supplements (BOOST HIGH PROTEIN) LIQD     omeprazole (PRILOSEC) 40 MG capsule     ondansetron (ZOFRAN ODT) 4 MG ODT tab     polyethylene glycol (MIRALAX/GLYCOLAX) packet     ranitidine (ZANTAC) 150 MG tablet     sennosides (SENOKOT) 8.6 MG tablet     Sharps Container MISC     traZODone (DESYREL) 50 MG tablet     No current facility-administered medications for this visit.        ALL:     Allergies   Allergen Reactions     Povidone Iodine Hives     Causes skin to blister     Corticosteroids Other (See Comments)     All oral,IV and injectable steroids are contraindicated (unless in life threatening situations) in Islet Auto transplant recipients. They can cause irreversible loss of islet cell function. Please contact patients transplant care coordinator JONATHAN Carvalho RN at /pager   and Endocrinologist prior to administration.      Nsaids      naprosyn = GI upset     Povidone Iodine      blisters     Sulfasalazine Nausea and Nausea and Vomiting       FMH:    Family History   Problem Relation Age of Onset     Family History Negative Mother      Respiratory Father      COPD;  at 69     Genitourinary Problems Father      kidney stones     Substance Abuse Father      Depression Father      Asthma Father      HEART DISEASE Paternal Grandfather      M.I.     Coronary Artery Disease Paternal Grandfather      Hyperlipidemia Paternal Grandfather      Genitourinary Problems Brother      multiple brothers with kidney stones      GASTROINTESTINAL DISEASE Maternal Grandmother      undiagnosed 'gut' issues     Coronary Artery Disease Maternal Grandfather      Hyperlipidemia Maternal Grandfather      Cerebrovascular Disease Paternal Grandmother      At the age of 103     Anxiety Disorder Paternal Grandmother      Osteoporosis Paternal Grandmother      Anxiety Disorder Son      Anxiety Disorder Daughter      Asthma Daughter        SocHx:    Social History     Social History     Marital status:      Spouse name: Tera     Number of children: 2     Years of education: N/A     Occupational History     customer service       [employer]     Social History Main Topics     Smoking status: Never Smoker     Smokeless tobacco: Never Used     Alcohol use No     Drug use: No     Sexual activity: Yes     Partners: Male     Birth control/ protection: None     Other Topics Concern     Parent/Sibling W/ Cabg, Mi Or Angioplasty Before 65f 55m? No     Social History Narrative           EXAMINATION:  Gen:   No apparent distress  Neuro:   A&Ox3, no deficits  Psych:    Answering questions appropriately for age and situation with normal affect  Head:    NCAT  Eye:    Visual scanning without deficit  Ear:    Response to auditory stimuli wnl  Lung:    Non-labored breathing on RA noted  Abd:    NTND per patient report  Lymph:    Neg for pitting/non-pitting edema BLE  Vasc:    Pulses palpable, CFT minimally delayed  Neuro:    Light touch sensation intact to all sensory nerve distributions without paresthesias  Derm:    Neg for nodules, lesions or ulcerations  MSK:    Left lower extremity - tender dorsal 2nd TMT joint, slight spurring noted.    Calf:    Neg for redness, swelling or tenderness    Imaging - IMPRESSION: No osseous abnormality to explain pain.    Assessment:  53 year old female with left 2nd TMT joint pain      Plan:  Discussed etiologies, anatomy and options  1.  Left 2nd TMT joint pain  -personally reviewed imaging  -RICE/NSAID vs tylenol prn based on  pain  -OTC insert for arch support  -comfortable shoe gear, minimize shoeless ambulation  -fluoro-guided steroid injection @ [facility]    Follow up:  Prn based on injection results or sooner with acute issues      Patient's medical history was reviewed today    Body mass index is 25.08 kg/(m^2).  Weight management plan: Patient was referred to their PCP to discuss a diet and exercise plan.        Manuel Chan DPM FACFAS FACFAOM  Podiatric Foot & Ankle Surgeon  [facility]  [phone]      Again, thank you for allowing me to participate in the care of your patient.        Sincerely,        Manuel Chan DPM, DPM

## 2018-08-31 NOTE — PROGRESS NOTES
"Foot & Ankle Surgery  August 31, 2018    CC: \"left foot pain\"    I was asked to see Lynn Thompson regarding the chief complaint by:  Dr. TAYLOR Brooks    HPI:  Pt is a 53 year old female who presents with above complaint.  Left foot pain x \"years\", describes a deep ache and throbbing pain.  Pain 8/10 daily.  She has tried arch supports, biofreeze, wearing shoes around the house, ice.  She had a previous injection in the 1990s for left midfoot pain    ROS:   Pos for CC.  The patient denies current nausea, vomiting, chills, fevers, belly pain, calf pain, chest pain or SOB.  Complete remainder of ROS is otherwise neg.    VITALS:    Vitals:    08/31/18 0848   BP: 112/72   Weight: 146 lb 1.6 oz (66.3 kg)   Height: 5' 4\" (1.626 m)       PMH:    Past Medical History:   Diagnosis Date     Abdominal pain, epigastric 11/05, ongoing; goes to pain clinic    probable recurrent spasm sphincter of Oddi causing biliary colic pains      Benign paroxysmal positional vertigo     occ.      Calculus of kidney 5/05    x1 on L side passed, several stones.  Has been tested for oxalate.     Chronic abdominal pain 7/17/2013     Chronic pancreatitis (H) 7/17/2013     Depressive disorder, not elsewhere classified     also occ panic spells     Diabetes (H)     post pancreatectomy     Dyspepsia and other specified disorders of function of stomach 6/99    H. 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    ERCP with Stent of CBD by Fernandez @ [facility] [place] with sx relief     Spasm of sphincter of Oddi     surgical + " endoscopic stenting of pancreatic duct [place] [facility] [place] 06     Thyroid nodule 2016     Ureteral calculus 10/2/2012     Vaccination not carried out        SXHX:    Past Surgical History:   Procedure Laterality Date     APPENDECTOMY       C NONSPECIFIC PROCEDURE      R bunion     C NONSPECIFIC PROCEDURE      T & A     C NONSPECIFIC PROCEDURE      partial ileum resection     C NONSPECIFIC PROCEDURE      R ovarian cyst     C NONSPECIFIC PROCEDURE           C NONSPECIFIC PROCEDURE      GALL BLADDER     C NONSPECIFIC PROCEDURE      CBD stent; Dr. Presley     C NONSPECIFIC PROCEDURE   &     colonoscopy     C NONSPECIFIC PROCEDURE      Gastric bypass     CHOLECYSTECTOMY       COLONOSCOPY       COMBINED CYSTOSCOPY, RETROGRADES, URETEROSCOPY, LASER HOLMIUM LITHOTRIPSY URETER(S), INSERT STENT Right 3/23/2015    Procedure: COMBINED CYSTOSCOPY, RETROGRADES, URETEROSCOPY, LASER HOLMIUM LITHOTRIPSY URETER(S), INSERT STENT;  Surgeon: Kennedi Aldana MD;  Location: [facility] OR     COMBINED CYSTOSCOPY, RETROGRADES, URETEROSCOPY, LASER HOLMIUM LITHOTRIPSY URETER(S), INSERT STENT Right 2015    Procedure: COMBINED CYSTOSCOPY, RETROGRADES, URETEROSCOPY, LASER HOLMIUM LITHOTRIPSY URETER(S), INSERT STENT;  Surgeon: Kennedi Aldana MD;  Location: [facility] OR     COSMETIC SURGERY  2002    Tummy tuck     CYSTOSCOPY, RETROGRADES, INSERT STENT URETER(S), COMBINED  10/2/2012    Procedure: COMBINED CYSTOSCOPY, RETROGRADES, INSERT STENT URETER(S);  COMBINED CYSTOSCOPY,  , INSERT LEFT STENT URETER;  Surgeon: Johny Baez MD;  Location:  OR     ENT SURGERY       ESOPHAGOSCOPY, GASTROSCOPY, DUODENOSCOPY (EGD), COMBINED N/A 2018    Procedure: COMBINED ESOPHAGOSCOPY, GASTROSCOPY, DUODENOSCOPY (EGD);  ESOPHAGOSCOPY, GASTROSCOPY, DUODENOSCOPY (EGD)    ;  Surgeon: Tamir Rodgers MD;  Location:  GI     EXTRACORPOREAL SHOCK WAVE LITHOTRIPSY (ESWL)  10/16/2012     Procedure: EXTRACORPOREAL SHOCK WAVE LITHOTRIPSY (ESWL);  left EXTRACORPOREAL SHOCK WAVE LITHOTRIPSY (ESWL) ;  Surgeon: Johny Baez MD;  Location: [facility] OR     GI SURGERY  12/29/2015    Small bowel obstruction     HC LAP,LYSIS OF ADHESIONS       HERNIA REPAIR  2/2015     LAPAROSCOPIC LYSIS ADHESIONS N/A 2/20/2015    Procedure: LAPAROSCOPIC LYSIS ADHESIONS;  Surgeon: Aaron Early MD;  Location: [facility]     LAPAROSCOPIC LYSIS ADHESIONS N/A 12/29/2015    Procedure: LAPAROSCOPIC LYSIS ADHESIONS;  Surgeon: Aaron Early MD;  Location: [facility]     PANCREATECTOMY, TRANSPLANT AUTO ISLET CELL, COMBINED  5/10/2013    Procedure: COMBINED PANCREATECTOMY, TRANSPLANT AUTO ISLET CELL;  Pancreatectomy, Auto Islet Cell Transplant   hernia repair, jejunostomy tube and liver biopsies with Anesthesia General with block;  Surgeon: Aaron Early MD;  Location: [facility] OR     TRANSPLANT  5/10/13        MEDS:    Current Outpatient Prescriptions   Medication     ACETAMINOPHEN PO     amylase-lipase-protease (VIOKACE) 49004 units TABS tablet     blood glucose (BILL MICROLET 2) lancing device     blood glucose monitoring (Alticast CONTOUR NEXT) test strip     blood glucose monitoring (NO BRAND SPECIFIED) test strip     buprenorphine HCl-naloxone HCl (SUBOXONE) 2-0.5 MG per film     buprenorphine HCl-naloxone HCl (SUBOXONE) 2-0.5 MG per film     busPIRone (BUSPAR) 15 MG tablet     cefuroxime (CEFTIN) 500 MG tablet     CLONIDINE HCL PO     DOCUSATE SODIUM PO     DULoxetine (CYMBALTA) 30 MG EC capsule     escitalopram (LEXAPRO) 10 MG tablet     ESTRACE VAGINAL 0.1 MG/GM cream     Estradiol-Norethindrone Acet 0.5-0.1 MG TABS     gabapentin (NEURONTIN) 300 MG capsule     glucose 40 % GEL     HydrOXYzine Pamoate (VISTARIL PO)     Injection Device for insulin (NOVOPEN JRHUNTER,) MODESTA     insulin glargine (LANTUS SOLOSTAR) 100 UNIT/ML pen     insulin pen needle (BD MAHESH U/F) 32G X 4 MM     levothyroxine (SYNTHROID/LEVOTHROID) 88 MCG tablet      loratadine (CLARITIN) 10 MG tablet     LORazepam (ATIVAN) 0.5 MG tablet     modafinil (PROVIGIL) 200 MG tablet     naloxegol (MOVANTIK) 25 MG TABS tablet     naloxone (NARCAN) nasal spray     NOVOLOG PENFILL SOLN 100 UNIT/ML     Nutritional Supplements (BOOST HIGH PROTEIN) LIQD     omeprazole (PRILOSEC) 40 MG capsule     ondansetron (ZOFRAN ODT) 4 MG ODT tab     polyethylene glycol (MIRALAX/GLYCOLAX) packet     ranitidine (ZANTAC) 150 MG tablet     sennosides (SENOKOT) 8.6 MG tablet     Sharps Container MISC     traZODone (DESYREL) 50 MG tablet     No current facility-administered medications for this visit.        ALL:     Allergies   Allergen Reactions     Povidone Iodine Hives     Causes skin to blister     Corticosteroids Other (See Comments)     All oral,IV and injectable steroids are contraindicated (unless in life threatening situations) in Islet Auto transplant recipients. They can cause irreversible loss of islet cell function. Please contact patients transplant care coordinator JONATHAN Carvalho RN at /pager   and Endocrinologist prior to administration.      Nsaids      naprosyn = GI upset     Povidone Iodine      blisters     Sulfasalazine Nausea and Nausea and Vomiting       FMH:    Family History   Problem Relation Age of Onset     Family History Negative Mother      Respiratory Father      COPD;  at 69     Genitourinary Problems Father      kidney stones     Substance Abuse Father      Depression Father      Asthma Father      HEART DISEASE Paternal Grandfather      M.I.     Coronary Artery Disease Paternal Grandfather      Hyperlipidemia Paternal Grandfather      Genitourinary Problems Brother      multiple brothers with kidney stones     GASTROINTESTINAL DISEASE Maternal Grandmother      undiagnosed 'gut' issues     Coronary Artery Disease Maternal Grandfather      Hyperlipidemia Maternal Grandfather      Cerebrovascular Disease Paternal Grandmother      At the age of 103      Anxiety Disorder Paternal Grandmother      Osteoporosis Paternal Grandmother      Anxiety Disorder Son      Anxiety Disorder Daughter      Asthma Daughter        SocHx:    Social History     Social History     Marital status:      Spouse name: Tera     Number of children: 2     Years of education: N/A     Occupational History     customer service       [employer]     Social History Main Topics     Smoking status: Never Smoker     Smokeless tobacco: Never Used     Alcohol use No     Drug use: No     Sexual activity: Yes     Partners: Male     Birth control/ protection: None     Other Topics Concern     Parent/Sibling W/ Cabg, Mi Or Angioplasty Before 65f 55m? No     Social History Narrative           EXAMINATION:  Gen:   No apparent distress  Neuro:   A&Ox3, no deficits  Psych:    Answering questions appropriately for age and situation with normal affect  Head:    NCAT  Eye:    Visual scanning without deficit  Ear:    Response to auditory stimuli wnl  Lung:    Non-labored breathing on RA noted  Abd:    NTND per patient report  Lymph:    Neg for pitting/non-pitting edema BLE  Vasc:    Pulses palpable, CFT minimally delayed  Neuro:    Light touch sensation intact to all sensory nerve distributions without paresthesias  Derm:    Neg for nodules, lesions or ulcerations  MSK:    Left lower extremity - tender dorsal 2nd TMT joint, slight spurring noted.    Calf:    Neg for redness, swelling or tenderness    Imaging - IMPRESSION: No osseous abnormality to explain pain.    Assessment:  53 year old female with left 2nd TMT joint pain      Plan:  Discussed etiologies, anatomy and options  1.  Left 2nd TMT joint pain  -personally reviewed imaging  -RICE/NSAID vs tylenol prn based on pain  -OTC insert for arch support  -comfortable shoe gear, minimize shoeless ambulation  -fluoro-guided steroid injection @ [facility]    Follow up:  Prn based on injection results or sooner with acute issues      Patient's medical history  was reviewed today    Body mass index is 25.08 kg/(m^2).  Weight management plan: Patient was referred to their PCP to discuss a diet and exercise plan.        Manuel Chan DPM FACFAS FACFAOM  Podiatric Foot & Ankle Surgeon  [facility]  [phone]

## 2018-08-31 NOTE — MR AVS SNAPSHOT
After Visit Summary   8/31/2018    Lynn Thompson    MRN: [id number]           Patient Information     Date Of Birth          1965        Visit Information        Provider Department      8/31/2018 8:45 AM Manuel Chan DPM [facility]        Care Instructions    Thank you for choosing [facility]!    DR. CHAN'S CLINIC LOCATIONS:   MONDAY - [place] TUESDAY - [place]   [address] [facility]  [address]   [place] [address] [place] [address]   [phone]       THURSDAY AM - [place] THURSDAY PM - [place]   [address]   [place] [address] [place], [place] [address] [phone]       FRIDAY AM - [place] SET UP SURGERY: [phone] [address] APPOINTMENTS: [phone]   [place] [address] BILLING QUESTIONS: [phone] FAX NUMBER: [phone]     Follow Up: as needed    PRICE THERAPY  Many aches and pains throughout the foot and ankle can be helped with many simple treatments. This is usually described as PRICE Therapy.      P - Protection - often times, inflammation/pain in the lower extremity is not able to improve simply because the areas involved are never allowed to rest. Every step we take can bother the problematic area. Protecting those areas is an important step in the healing process. This may involve a walking cast boot, a special insert/orthotic device, an ankle brace, or simply avoiding barefoot walking.    R - Rest - in addition to protecting the foot/ankle, resting is an important, but often times difficult, treatment option. Getting off your feet when they bother you, and specifically avoiding activities that cause pain/discomfort, are very beneficial to prevent, and treat, foot/ankle pain.      I - Ice - icing regularly can help to decrease inflammation and swelling in the foot, thus decreasing pain. Using an ice pack  or a bag of frozen veggies works very well. Ice for 20 minutes multiple times per day as needed.  Do not place the ice directly on the skin as this can cause tissue damage.    C - Compression - using a compression wrap or an ACE wrap can help to decrease swelling, which can help to decrease pain. Wearing the wraps is generally not needed at night, but they should be worn on a regular basis when you are going to be on your feet for prolonged periods as gravity tends to pull fluids down to your feet/ankles.    E - Elevation - elevating your lower extremities multiple times daily for 15-20 minutes can help to decrease swelling, which works well in decreasing pain levels.    NSAID/Tylenol - Anti-inflammatories like Aleve or ibuprofen, and/or a pain medication, such as Tylenol, can help to improve pain levels and get the issue resolved sooner rather than later. Anyone with liver issues should be careful with Tylenol, and anyone with high blood pressure or heart, stomach or kidney issues should be careful with anti-inflammatories. Please ask if you have questions about these medications, including dosage.    OVER THE COUNTER INSERTS    SuperFeet   Sofsole Fit Spenco   Power Step   Walk-Fit Arch Cradles     Most of these can be found at your local [employer], [employer], [employer], or online:    1.  https://www.O[employer].Netadmin/en-us/  2.  Https://[employer].Netadmin/  3.  Https://www.[employer] Therapiess.Netadmin/    **A good high quality over the counter insert should cost around $40-$50          [facility] SCHEDULING  They should be calling you within 24 hours to schedule your scan.  If not, please call the location discussed at your appointment.    1) [facility]:       [phone]      [address] [place] [address]    2) [facility]:      [phone]      [address] [address]    3) [facility]:       [phone]       [address] [place] [address]    OSTEOARTHRITIS OF THE FOOT & ANKLE  Osteoarthritis is a condition characterized by the breakdown and eventual loss of cartilage in one or more joints. Cartilage (the connective tissue found at the end of the bones in the joints) protects and cushions the bones during movement. When cartilage deteriorates or is lost, symptoms develop that can restrict one s ability to easily perform daily activities.  Osteoarthritis is also known as degenerative arthritis, reflecting its nature to develop as part of the aging process. As the most common form of arthritis, osteoarthritis affects millions of Americans. Some people refer to osteoarthritis simply as arthritis, even though there are many different types of arthritis.  Osteoarthritis appears at various joints throughout the body, including the hands, feet, spine, hips, and knees. In the foot, the disease most frequently occurs in the big toe, although it is also often found in the midfoot and ankle.  CAUSES  Osteoarthritis is considered a  wear and tear  disease because the cartilage in the joint wears down with repeated stress and use over time. As the cartilage deteriorates and gets thinner, the bones lose their protective covering and eventually may rub together, causing pain and inflammation of the joint.  An injury may also lead to osteoarthritis, although it may take months or years after the injury for the condition to develop. For example, osteoarthritis in the big toe is often caused by kicking or jamming the toe, or by dropping something on the toe. Osteoarthritis in the midfoot is often caused by dropping something on it, or by a sprain or fracture. In the ankle, osteoarthritis is usually caused by a fracture and occasionally by a severe sprain.  Sometimes osteoarthritis develops as a result of abnormal foot mechanics such as flat feet or high arches. A flat foot causes less stability in the ligaments (bands of  tissue that connect bones), resulting in excessive strain on the joints, which can cause arthritis. A high arch is rigid and lacks mobility, causing a jamming of joints that creates an increased risk of arthritis.  SYMPTOMS  People with osteoarthritis in the foot or ankle experience, in varying degrees, one or more of the following: Pain and stiffness in the joint, swelling in or near the joint, or difficulty walking or bending the joint.   Some patients with osteoarthritis also develop a bone spur (a bony protrusion) at the affected joint. Shoe pressure may cause pain at the site of a bone spur, and in some cases blisters or calluses may form over its surface. Bone spurs can also limit the movement of the joint.    DIAGNOSIS  In diagnosing osteoarthritis, the foot and ankle surgeon will examine the foot thoroughly, looking for swelling in the joint, limited mobility, and pain with movement. In some cases, deformity and/or enlargement (spur) of the joint may be noted. X-rays may be ordered to evaluate the extent of the disease.  NON-SURGICAL TREATMENT  To help relieve symptoms, the surgeon may begin treating osteoarthritis with one or more of the following non-surgical approaches:  Oral medications. Nonsteroidal anti-inflammatory drugs (NSAIDs), such as ibuprofen, are often helpful in reducing the inflammation and pain. Occasionally a prescription for a steroid medication is needed to adequately reduce symptoms.   Orthotic devices. Custom orthotic devices (shoe inserts) are often prescribed to provide support to improve the foot s mechanics or cushioning to help minimize pain.   Bracing. Bracing, which restricts motion and supports the joint, can reduce pain during walking and help prevent further deformity.   Immobilization. Protecting the foot from movement by wearing a cast or removable cast-boot may be necessary to allow the inflammation to resolve.   Steroid injections. In some cases, steroid injections are  applied to the affected joint to deliver anti-inflammatory medication.   Physical therapy. Exercises to strengthen the muscles, especially when the osteoarthritis occurs in the ankle, may give the patient greater stability and help avoid injury that might worsen the condition.   DO I NEED SURGERY?  When osteoarthritis has progressed substantially or failed to improve with non-surgical treatment, surgery may be recommended. In advanced cases, surgery may be the only option. The goal of surgery is to decrease pain and improve function. The foot and ankle surgeon will consider a number of factors when selecting the procedure best suited to the patient s condition and lifestyle.                  Body Mass Index (BMI)  Many things can cause foot and ankle problems. Foot structure, activity level, foot mechanics and injuries are common causes of pain. One very important issue that often goes unmentioned, is body weight. Extra weight can cause increased stress on muscles, ligaments, bones and tendons. Sometimes just a few extra pounds is all it takes to put one over her/his threshold. Without reducing that stress, it can be difficult to alleviate pain. Some people are uncomfortable addressing this issue, but we feel it is important for you to think about it. As Foot &  Ankle specialists, our job is addressing the lower extremity problem and possible causes. Regarding extra body weight, we encourage patients to discuss diet and weight management plans with their primary care doctors. It is this team approach that gives you the best opportunity for pain relief and getting you back on your feet.            Follow-ups after your visit        Your next 10 appointments already scheduled     Sep 20, 2018 10:00 AM CDT   (Arrive by 9:45 AM)   Return [place] with Adriana Robles MD   [facility] ([facility])    [address]  [address]  [facility] [address] [phone]  "             Who to contact     If you have questions or need follow up information about today's clinic visit or your schedule please contact [facility] directly at [phone].  Normal or non-critical lab and imaging results will be communicated to you by Xcoveryhart, letter or phone within 4 business days after the clinic has received the results. If you do not hear from us within 7 days, please contact the clinic through Xcoveryhart or phone. If you have a critical or abnormal lab result, we will notify you by phone as soon as possible.  Submit refill requests through Madmagz or call your pharmacy and they will forward the refill request to us. Please allow 3 business days for your refill to be completed.          Additional Information About Your Visit        XcoveryharMindEdge Information     Madmagz gives you secure access to your electronic health record. If you see a primary care provider, you can also send messages to your care team and make appointments. If you have questions, please call your primary care clinic.  If you do not have a primary care provider, please call [phone] and they will assist you.        Care EveryWhere ID     This is your Care EveryWhere ID. This could be used by other organizations to access your [facility] medical records  [id number]        Your Vitals Were     Height Last Period BMI (Body Mass Index)             5' 4\" (1.626 m) 12/19/2013 25.08 kg/m2          Blood Pressure from Last 3 Encounters:   08/31/18 112/72   08/23/18 117/80   08/19/18 126/74    Weight from Last 3 Encounters:   08/31/18 146 lb 1.6 oz (66.3 kg)   08/23/18 146 lb 1.6 oz (66.3 kg)   08/19/18 145 lb (65.8 kg)              Today, you had the following     No orders found for display         Today's Medication Changes          These changes are accurate as of 8/31/18  9:10 AM.  If you have any questions, ask your nurse or doctor.               These medicines have changed or have updated prescriptions.     "    Dose/Directions    busPIRone 15 MG tablet   Commonly known as:  BUSPAR   This may have changed:    - how much to take  - when to take this  - additional instructions   Used for:  Anxiety        Increased dose. 30 mg two times per day.   Quantity:  120 tablet   Refills:  3                Primary Care Provider Office Phone # Fax #    Maryana Brooks [phone] [address] [facility] [address] [address]        Equal Access to Services     CHAD SOLANO : Hadii aad ku hadasho Soomaali, waaxda luqadaha, qaybta kaalmada adeegyada, waxay idiin hayaan adeeg kharantahalie la'magnus . So [facility] [phone].    ATENCIÓN: Si habla español, tiene a rivera disposición servicios gratuitos de asistencia lingüística. [facility] [phone].    We comply with applicable federal civil rights laws and [place] laws. We do not discriminate on the basis of race, color, national origin, age, disability, sex, sexual orientation, or gender identity.            Thank you!     Thank you for choosing [facility]  for your care. Our goal is always to provide you with excellent care. Hearing back from our patients is one way we can continue to improve our services. Please take a few minutes to complete the written survey that you may receive in the mail after your visit with us. Thank you!             Your Updated Medication List - Protect others around you: Learn how to safely use, store and throw away your medicines at www.disposemymeds.org.          This list is accurate as of 8/31/18  9:10 AM.  Always use your most recent med list.                   Brand Name Dispense Instructions for use Diagnosis    ACETAMINOPHEN PO      Take 325 mg by mouth every 6 hours as needed for pain        amylase-lipase-protease 38951 units Tabs tablet    VIOKACE    600 tablet    Take 4-5 caps with meals and 1-2 with snacks    Pancreatic insufficiency       blood glucose lancing device     1 each    Use to test blood sugars 6 times  daily or as directed.    Absence of pancreas, acquired       * blood glucose monitoring test strip    BILL CONTOUR NEXT    2 Box    Check BS 4-6 times a day    Absence of pancreas, acquired       * blood glucose monitoring test strip    no brand specified      Left foot pain       BOOST HIGH PROTEIN Liqd      After above baseline labs are drawn, give: 6 mL/kg to maximum of 360 mL; the beverage is to be consumed within 5 minutes.    Post-pancreatectomy diabetes (H)       * buprenorphine HCl-naloxone HCl 2-0.5 MG per film    SUBOXONE     Place 0.5 Film under the tongue every morning Patient states she takes an 1/8 of a strip daily.        * buprenorphine HCl-naloxone HCl 2-0.5 MG per film    SUBOXONE     Place 0.25 Film under the tongue every evening        busPIRone 15 MG tablet    BUSPAR    120 tablet    Increased dose. 30 mg two times per day.    Anxiety       cefuroxime 500 MG tablet    CEFTIN    20 tablet    Take 1 tablet (500 mg) by mouth 2 times daily    Acute sinusitis with symptoms > 10 days       CLONIDINE HCL PO      Take 0.1 mg by mouth 2 times daily as needed        DOCUSATE SODIUM PO      Take 100 mg by mouth daily        DULoxetine 30 MG EC capsule    CYMBALTA    180 capsule    Take 1 capsule (30 mg) by mouth 2 times daily Fill at patient request.    Anxiety       escitalopram 10 MG tablet    LEXAPRO      Left foot pain       ESTRACE VAGINAL 0.1 MG/GM cream   Generic drug:  estradiol     42.5 g    PLACE 2 GRAMS VAGINALLY TWICE A WEEK    Vaginal atrophy       Estradiol-Norethindrone Acet 0.5-0.1 MG Tabs     84 tablet    TAKE ONE TABLET BY MOUTH DAILY    Decreased libido       gabapentin 300 MG capsule    NEURONTIN          glucose 40 % (400 mg/mL) Gel gel     1 Tube    Take 15-30 g by mouth every 15 minutes as needed.    Chronic pancreatitis (H)       insulin glargine 100 UNIT/ML injection    LANTUS SOLOSTAR    15 mL    Inject 5 Units Subcutaneous every evening    Post-pancreatectomy diabetes (H)        insulin pen needle 32G X 4 MM    BD MAHESH U/F    100 each    Use up to 3 times daily as needed for insulin dosing    Acquired absence of pancreas       levothyroxine 88 MCG tablet    SYNTHROID/LEVOTHROID    90 tablet    Take 1 tablet (88 mcg) by mouth daily    Acquired hypothyroidism       loratadine 10 MG tablet    CLARITIN     Take 10 mg by mouth daily as needed        LORazepam 0.5 MG tablet    ATIVAN    30 tablet    Take 1 tablet (0.5 mg) by mouth every 8 hours as needed for anxiety    Anxiety       modafinil 200 MG tablet    PROVIGIL     Take 2 tablets by mouth Once a Day        MOVANTIK 25 MG Tabs tablet   Generic drug:  naloxegol      Take 25 mg by mouth every morning (before breakfast)        naloxone nasal spray    NARCAN     Spray 4 mg in nostril as needed for opioid reversal        NovoLOG penFILL 100 UNITS/ML injection   Generic drug:  insulin aspart     3 Month    Take as directed    Diabetes mellitus type 1 (H)       NOVOPEN JR (HUNTER) Genny   Generic drug:  Injection Device for insulin      1 Device Inject 1 unit (which is marked as a 1/2 unit on the pen itself)  as needed when eating carb heavy meals.        omeprazole 40 MG capsule    priLOSEC    180 capsule    Take 1 capsule (40 mg) by mouth 2 times daily Take 30-60 minutes before a meal.    Gastroesophageal reflux disease without esophagitis, Nausea, Gastric bypass status for obesity       ondansetron 4 MG ODT tab    ZOFRAN ODT    20 tablet    Take 1-2 tablets (4-8 mg) by mouth 3 times daily (before meals)    Nausea       polyethylene glycol Packet    MIRALAX/GLYCOLAX    7 packet    Take 17 g by mouth daily    Constipation       ranitidine 150 MG tablet    ZANTAC    180 tablet    Take 1 tablet (150 mg) by mouth 2 times daily    Nausea       sennosides 8.6 MG tablet    SENOKOT    120 tablet    Take 2 tablets by mouth 2 times daily    Other constipation       Sharps Container Misc     1 each    For insulin needles    Absence of pancreas, acquired        traZODone 50 MG tablet    DESYREL    90 tablet    Take 1 tablet (50 mg) by mouth At Bedtime    Persistent insomnia       VISTARIL PO      Take 25 mg by mouth every 6 hours as needed for itching        * Notice:  This list has 4 medication(s) that are the same as other medications prescribed for you. Read the directions carefully, and ask your doctor or other care provider to review them with you.

## 2018-08-31 NOTE — PATIENT INSTRUCTIONS
Thank you for choosing [facility]!    DR. ROE'S CLINIC LOCATIONS:   MONDAY - [place] TUESDAY - [place]   [address] [facility]  [address]   [place] [address] [place] [address]   [phone]       THURSDAY AM - [place] THURSDAY PM - [place]   [address]   [place] [address] [place], [place] [id number] [phone]       FRIDAY AM - [place] SET UP SURGERY: [phone] [address] APPOINTMENTS: [phone]   [place] [address] BILLING QUESTIONS: [phone] FAX NUMBER: [phone]     Follow Up: as needed    PRICE THERAPY  Many aches and pains throughout the foot and ankle can be helped with many simple treatments. This is usually described as PRICE Therapy.      P - Protection - often times, inflammation/pain in the lower extremity is not able to improve simply because the areas involved are never allowed to rest. Every step we take can bother the problematic area. Protecting those areas is an important step in the healing process. This may involve a walking cast boot, a special insert/orthotic device, an ankle brace, or simply avoiding barefoot walking.    R - Rest - in addition to protecting the foot/ankle, resting is an important, but often times difficult, treatment option. Getting off your feet when they bother you, and specifically avoiding activities that cause pain/discomfort, are very beneficial to prevent, and treat, foot/ankle pain.      I - Ice - icing regularly can help to decrease inflammation and swelling in the foot, thus decreasing pain. Using an ice pack or a bag of frozen veggies works very well. Ice for 20 minutes multiple times per day as needed.  Do not place the ice directly on the skin as this can cause tissue damage.    C - Compression - using a compression wrap or an ACE wrap can help to decrease swelling, which can help to decrease pain. Wearing  the wraps is generally not needed at night, but they should be worn on a regular basis when you are going to be on your feet for prolonged periods as gravity tends to pull fluids down to your feet/ankles.    E - Elevation - elevating your lower extremities multiple times daily for 15-20 minutes can help to decrease swelling, which works well in decreasing pain levels.    NSAID/Tylenol - Anti-inflammatories like Aleve or ibuprofen, and/or a pain medication, such as Tylenol, can help to improve pain levels and get the issue resolved sooner rather than later. Anyone with liver issues should be careful with Tylenol, and anyone with high blood pressure or heart, stomach or kidney issues should be careful with anti-inflammatories. Please ask if you have questions about these medications, including dosage.    OVER THE COUNTER INSERTS    Go[employer] Fit Hover [employer]   Power Step   Walk-Fit Arch Cradles     Most of these can be found at your local [employer], [employer], or online:    1.  https://www.[employer]/en-us/  2.  Https://Flipboard.Zango/  3.  Https://www.powersteps.com/    **A good high quality over the counter insert should cost around $40-$50          [facility] SCHEDULING  They should be calling you within 24 hours to schedule your scan.  If not, please call the location discussed at your appointment.    1) [facility]:       [phone]      [address] [place] [address]    2) [facility]:      [phone]      [address] [place] [address]    3) [facility]:       [phone]      [facility] [address] [address]    OSTEOARTHRITIS OF THE FOOT & ANKLE  Osteoarthritis is a condition characterized by the breakdown and eventual loss of cartilage in one or more joints. Cartilage (the connective tissue found at the end of the bones in the joints) protects and cushions the bones  during movement. When cartilage deteriorates or is lost, symptoms develop that can restrict one s ability to easily perform daily activities.  Osteoarthritis is also known as degenerative arthritis, reflecting its nature to develop as part of the aging process. As the most common form of arthritis, osteoarthritis affects millions of Americans. Some people refer to osteoarthritis simply as arthritis, even though there are many different types of arthritis.  Osteoarthritis appears at various joints throughout the body, including the hands, feet, spine, hips, and knees. In the foot, the disease most frequently occurs in the big toe, although it is also often found in the midfoot and ankle.  CAUSES  Osteoarthritis is considered a  wear and tear  disease because the cartilage in the joint wears down with repeated stress and use over time. As the cartilage deteriorates and gets thinner, the bones lose their protective covering and eventually may rub together, causing pain and inflammation of the joint.  An injury may also lead to osteoarthritis, although it may take months or years after the injury for the condition to develop. For example, osteoarthritis in the big toe is often caused by kicking or jamming the toe, or by dropping something on the toe. Osteoarthritis in the midfoot is often caused by dropping something on it, or by a sprain or fracture. In the ankle, osteoarthritis is usually caused by a fracture and occasionally by a severe sprain.  Sometimes osteoarthritis develops as a result of abnormal foot mechanics such as flat feet or high arches. A flat foot causes less stability in the ligaments (bands of tissue that connect bones), resulting in excessive strain on the joints, which can cause arthritis. A high arch is rigid and lacks mobility, causing a jamming of joints that creates an increased risk of arthritis.  SYMPTOMS  People with osteoarthritis in the foot or ankle experience, in varying degrees, one or  more of the following: Pain and stiffness in the joint, swelling in or near the joint, or difficulty walking or bending the joint.   Some patients with osteoarthritis also develop a bone spur (a bony protrusion) at the affected joint. Shoe pressure may cause pain at the site of a bone spur, and in some cases blisters or calluses may form over its surface. Bone spurs can also limit the movement of the joint.    DIAGNOSIS  In diagnosing osteoarthritis, the foot and ankle surgeon will examine the foot thoroughly, looking for swelling in the joint, limited mobility, and pain with movement. In some cases, deformity and/or enlargement (spur) of the joint may be noted. X-rays may be ordered to evaluate the extent of the disease.  NON-SURGICAL TREATMENT  To help relieve symptoms, the surgeon may begin treating osteoarthritis with one or more of the following non-surgical approaches:  Oral medications. Nonsteroidal anti-inflammatory drugs (NSAIDs), such as ibuprofen, are often helpful in reducing the inflammation and pain. Occasionally a prescription for a steroid medication is needed to adequately reduce symptoms.   Orthotic devices. Custom orthotic devices (shoe inserts) are often prescribed to provide support to improve the foot s mechanics or cushioning to help minimize pain.   Bracing. Bracing, which restricts motion and supports the joint, can reduce pain during walking and help prevent further deformity.   Immobilization. Protecting the foot from movement by wearing a cast or removable cast-boot may be necessary to allow the inflammation to resolve.   Steroid injections. In some cases, steroid injections are applied to the affected joint to deliver anti-inflammatory medication.   Physical therapy. Exercises to strengthen the muscles, especially when the osteoarthritis occurs in the ankle, may give the patient greater stability and help avoid injury that might worsen the condition.   DO I NEED SURGERY?  When  osteoarthritis has progressed substantially or failed to improve with non-surgical treatment, surgery may be recommended. In advanced cases, surgery may be the only option. The goal of surgery is to decrease pain and improve function. The foot and ankle surgeon will consider a number of factors when selecting the procedure best suited to the patient s condition and lifestyle.                  Body Mass Index (BMI)  Many things can cause foot and ankle problems. Foot structure, activity level, foot mechanics and injuries are common causes of pain. One very important issue that often goes unmentioned, is body weight. Extra weight can cause increased stress on muscles, ligaments, bones and tendons. Sometimes just a few extra pounds is all it takes to put one over her/his threshold. Without reducing that stress, it can be difficult to alleviate pain. Some people are uncomfortable addressing this issue, but we feel it is important for you to think about it. As Foot &  Ankle specialists, our job is addressing the lower extremity problem and possible causes. Regarding extra body weight, we encourage patients to discuss diet and weight management plans with their primary care doctors. It is this team approach that gives you the best opportunity for pain relief and getting you back on your feet.

## 2018-09-12 DIAGNOSIS — E10.9 DIABETES MELLITUS TYPE 1 (H): ICD-10-CM

## 2018-09-17 ENCOUNTER — TRANSFERRED RECORDS (OUTPATIENT)
Dept: HEALTH INFORMATION MANAGEMENT | Facility: CLINIC | Age: 53
End: 2018-09-17

## 2018-09-20 ENCOUNTER — OFFICE VISIT (OUTPATIENT)
Dept: TRANSPLANT | Facility: CLINIC | Age: 53
End: 2018-09-20
Attending: PEDIATRICS
Payer: COMMERCIAL

## 2018-09-20 VITALS
HEIGHT: 64 IN | DIASTOLIC BLOOD PRESSURE: 69 MMHG | BODY MASS INDEX: 24.92 KG/M2 | SYSTOLIC BLOOD PRESSURE: 137 MMHG | TEMPERATURE: 99.2 F | HEART RATE: 99 BPM | OXYGEN SATURATION: 98 % | WEIGHT: 146 LBS

## 2018-09-20 DIAGNOSIS — E13.9 POST-PANCREATECTOMY DIABETES (H): ICD-10-CM

## 2018-09-20 DIAGNOSIS — E89.1 POST-PANCREATECTOMY DIABETES (H): ICD-10-CM

## 2018-09-20 DIAGNOSIS — Z90.410 POST-PANCREATECTOMY DIABETES (H): ICD-10-CM

## 2018-09-20 PROCEDURE — G0463 HOSPITAL OUTPT CLINIC VISIT: HCPCS | Mod: ZF

## 2018-09-20 RX ORDER — FLASH GLUCOSE SENSOR
1 KIT MISCELLANEOUS
Qty: 1 DEVICE | Refills: 0 | Status: SHIPPED | OUTPATIENT
Start: 2018-09-20 | End: 2019-10-31

## 2018-09-20 RX ORDER — FLASH GLUCOSE SENSOR
KIT MISCELLANEOUS
Qty: 3 EACH | Refills: 11 | Status: SHIPPED | OUTPATIENT
Start: 2018-09-20 | End: 2019-10-31

## 2018-09-20 ASSESSMENT — PAIN SCALES - GENERAL: PAINLEVEL: NO PAIN (0)

## 2018-09-20 NOTE — LETTER
9/20/2018      RE: Lynn Thompson  [address] [address]       [facility]  Islet Autotransplant, Diabetes Follow Up    Problem List:  Patient Active Problem List   Diagnosis     Iron deficiency anemia secondary to inadequate dietary iron intake     Gastric bypass status for obesity     Health Care Home     Chronic pain syndrome     Exocrine pancreatic insufficiency     Asplenia     BLU (obstructive sleep apnea)     S/P exploratory laparotomy     Dysthymia     Anxiety     Thyroid nodule     Acquired hypothyroidism     Post-pancreatectomy diabetes (H)     E coli bacteremia     Antibiotic-associated diarrhea     Elevated LFTs       HPI:  Lynn is a 53 year old female here for follow up o total pancreatectomy, islet cell autotransplant, splenectomy, liver biopsy (needle core), choledochoduodenostomy, feeding jejunostomy performed on 5/10/2013.  At the time of the procedure, the patient received 178,100 IEQ, or 3,209 IEQ/kg body weight. She has had two subsequent exploratory laparatomy for small bowel obstruction. Other notable endocrine history includes a complex cystic nodule in mid right thyroid lobe with 1 cm maximum diameter (saw Dr Adorno in 11/2016) which needs annual follow up U/S in Nov 2017, and mild hypothyroidism followed by PCP.  She had an episode of cholangitis and was hospitalized for 4 days 8/24/17 (fevers, RUQ pain, + Ecoli blood cx).    She was on NO insulin at her 1 year, 2 year, 3 year, 4 year transplant anniversaries and restart insulin just after 4 years post-tx, insulin restart date of  6/6/2017.  She is continuing to wean narcotics, on a suboxone wean, now on a 1/8 patch (Suboxone 1- 0.5 mg film) daily, with no other narcotics.     At today's visit, overall Lynn is doing well overall.  Continues to have some struggles with anxiety.  She was recently an invited speaker on opioid addiction at a meeting.    1)  Diabetes:  Lynn  continues to have partial islet graft function.  Her A1c is improved but above goal despite recent changes.  We have BG from meter 3 times per day that look very normal, suspect excursions occur after meals. She is not having any current reactive hypoglycemia.  She saw the dietitian after last visit which was helpful for her.    Diabetes history:  Current insulin regimen:  Lantus 5 units per day  Novolog 0.5 units per 30g, and correction scale of 0.5 u per 100 >150 -- uses about 2.5 unit per day  TDD: 7.5 unit per day, total insulin on average.      Recent A1c:  Today 7.2%  Lab Results   Component Value Date    A1C 7.5 2018    A1C 6.3 2017    A1C 6.5 2017    A1C 7.0 2017    A1C 7.2 2016       Hypoglycemia history:  None recent (lowest is 75 mg/dL).  The patient has had NO episodes of severe hypoglycemia (seizure, coma, or neuroglycopenic symptoms severe enough to require assistance from another person).  Blood sugars were reviewed from the patient records and/or the meter download.    Average B mg/dL SD 27 mg/dL  Number of blood sugar checks per day 2.7 in this meter-- needs strips for 4 checks per day.     2)  GI symptoms:   Pretty good overall.  No pain and no pancreatitis pain and no pain medicines except the suboxone wean for opioid addiction.  She still has episodes of distention but these are helped by addition of Movantik.    3)  She is being managed for anxiety now      Review of systems:  Review Of Systems  Skin: negative  Eyes: negative  Ears/Nose/Throat: negative  Respiratory: No shortness of breath, dyspnea on exertion, cough, or hemoptysis  Cardiovascular: negative  Gastrointestinal: as above  Genitourinary: negative  Musculoskeletal: negative  Neurologic: negative  Psychiatric: anxiety  Hematologic/Lymphatic/Immunologic: negative  Endocrine: as above    Past Medical and Surgical History:  Past Medical History:   Diagnosis Date     Abdominal pain, epigastric ,  ongoing; goes to pain clinic    probable recurrent spasm sphincter of Oddi causing biliary colic pains      Benign paroxysmal positional vertigo     occ.      Calculus of kidney 5/05    x1 on L side passed, several stones.  Has been tested for oxalate.     Chronic abdominal pain 2013     Chronic pancreatitis (H) 2013     Depressive disorder, not elsewhere classified     also occ panic spells     Diabetes (H)     post pancreatectomy     Dyspepsia and other specified disorders of function of stomach     H. pylori   treated     Headache(784.0)     still periodic HA's ;  often 5X/week     Hypertension 16    Stress related     Iron deficiency anemia secondary to inadequate dietary iron intake     relates to gastric bypass     Other chronic pain      Post-pancreatectomy diabetes (H) 2013     Pyelonephritis, unspecified , 10/8    L side     Regional enteritis of unspecified site     inacive/remission     Sleep apnea     uses Cpap     Spasm of sphincter of Oddi     [place] [facility] [place] sx relief     Spasm of sphincter of Oddi     surgical + endoscopic stenting of pancreatic duct [place] [facility] 06     Thyroid nodule 2016     Ureteral calculus 10/2/2012     Vaccination not carried out      Past Surgical History:   Procedure Laterality Date     APPENDECTOMY       C NONSPECIFIC PROCEDURE      R bunion     C NONSPECIFIC PROCEDURE      T & A     C NONSPECIFIC PROCEDURE      partial ileum resection     C NONSPECIFIC PROCEDURE      R ovarian cyst     C NONSPECIFIC PROCEDURE           C NONSPECIFIC PROCEDURE      GALL BLADDER     C NONSPECIFIC PROCEDURE      CBD stent; Dr. Fernandez MAY NONSPECIFIC PROCEDURE   &     colonoscopy     C NONSPECIFIC PROCEDURE      Gastric bypass     CHOLECYSTECTOMY       COLONOSCOPY       COMBINED CYSTOSCOPY, RETROGRADES, URETEROSCOPY, LASER HOLMIUM LITHOTRIPSY URETER(S), INSERT  STENT Right 3/23/2015    Procedure: COMBINED CYSTOSCOPY, RETROGRADES, URETEROSCOPY, LASER HOLMIUM LITHOTRIPSY URETER(S), INSERT STENT;  Surgeon: Kennedi Aldana MD;  Location: [facility] OR     COMBINED CYSTOSCOPY, RETROGRADES, URETEROSCOPY, LASER HOLMIUM LITHOTRIPSY URETER(S), INSERT STENT Right 4/20/2015    Procedure: COMBINED CYSTOSCOPY, RETROGRADES, URETEROSCOPY, LASER HOLMIUM LITHOTRIPSY URETER(S), INSERT STENT;  Surgeon: Kennedi Aldana MD;  Location: [facility] OR     COSMETIC SURGERY  6/24/2002    Tummy sherrie     CYSTOSCOPY, RETROGRADES, INSERT STENT URETER(S), COMBINED  10/2/2012    Procedure: COMBINED CYSTOSCOPY, RETROGRADES, INSERT STENT URETER(S);  COMBINED CYSTOSCOPY,  , INSERT LEFT STENT URETER;  Surgeon: Johny Baez MD;  Location: [facility] OR     ENT SURGERY       ESOPHAGOSCOPY, GASTROSCOPY, DUODENOSCOPY (EGD), COMBINED N/A 1/24/2018    Procedure: COMBINED ESOPHAGOSCOPY, GASTROSCOPY, DUODENOSCOPY (EGD);  ESOPHAGOSCOPY, GASTROSCOPY, DUODENOSCOPY (EGD)    ;  Surgeon: Tamir Rodgers MD;  Location:  GI     EXTRACORPOREAL SHOCK WAVE LITHOTRIPSY (ESWL)  10/16/2012    Procedure: EXTRACORPOREAL SHOCK WAVE LITHOTRIPSY (ESWL);  left EXTRACORPOREAL SHOCK WAVE LITHOTRIPSY (ESWL) ;  Surgeon: Johny Baez MD;  Location: [facility] OR     GI SURGERY  12/29/2015    Small bowel obstruction     HC LAP,LYSIS OF ADHESIONS       HERNIA REPAIR  2/2015     LAPAROSCOPIC LYSIS ADHESIONS N/A 2/20/2015    Procedure: LAPAROSCOPIC LYSIS ADHESIONS;  Surgeon: Aaron Early MD;  [facility]     LAPAROSCOPIC LYSIS ADHESIONS N/A 12/29/2015    Procedure: LAPAROSCOPIC LYSIS ADHESIONS;  Surgeon: Aaron Early MD;  [facility] OR     PANCREATECTOMY, TRANSPLANT AUTO ISLET CELL, COMBINED  5/10/2013    Procedure: COMBINED PANCREATECTOMY, TRANSPLANT AUTO ISLET CELL;  Pancreatectomy, Auto Islet Cell Transplant   hernia repair, jejunostomy tube and liver biopsies with Anesthesia General with block;  Surgeon: Aaron Early MD;   "Location: [facility] OR     TRANSPLANT  5/10/13       Family History:  New changes since last visit:  none  Family History   Problem Relation Age of Onset     Family History Negative Mother      Respiratory Father      COPD;  at 69     Genitourinary Problems Father      kidney stones     Substance Abuse Father      Depression Father      Asthma Father      HEART DISEASE Paternal Grandfather      M.I.     Coronary Artery Disease Paternal Grandfather      Hyperlipidemia Paternal Grandfather      Genitourinary Problems Brother      multiple brothers with kidney stones     GASTROINTESTINAL DISEASE Maternal Grandmother      undiagnosed 'gut' issues     Coronary Artery Disease Maternal Grandfather      Hyperlipidemia Maternal Grandfather      Cerebrovascular Disease Paternal Grandmother      At the age of 103     Anxiety Disorder Paternal Grandmother      Osteoporosis Paternal Grandmother      Anxiety Disorder Son      Anxiety Disorder Daughter      Asthma Daughter        Social History:  Social History     Social History Narrative     She was previously laid off from job and found that the stress reduction and getting away from work environment has helped greatly with her health.  Seeking disability.  Some stress at home including sister in law with brain tumor    Physical Exam:  Vitals: /69  Pulse 99  Temp 99.2  F (37.3  C) (Oral)  Ht 1.626 m (5' 4\")  Wt 66.2 kg (146 lb)  LMP 2013  SpO2 98%  BMI 25.06 kg/m2  BMI= Body mass index is 25.06 kg/(m^2).  General:  Well-appearing, NAD  Psych:  Communicative, with normal affect         Assessment:  1.  Post pancreatectomy diabetes mellitus, s/p total pancreatectomy and islet autotransplant.    Lynn is a 53 year old with history of chronic pancreatitis who is s/p total pancreatectomy and islet autotransplant.  She was insulin independent until 2017 (just after 4 years post-tx) and now has partial islet function.    Pre prandial BG control is excellent, " suspect elevated A1c is related to post prandial excursions.  She asks about Bre which could be very helpful to us to confirm excursions after meal.  We made insulin adjustments as below.    Plan:  1.  Changes to current diabetes regimen:  Patient Instructions     1)  Lantus:  Keep the 5 unit Lantus daily.  I will update Rx.    2)  Novolog coverage: increased dose                   What your blood glucose is before eating:   How much you plan to eat: 80- 150 mg/dL 151- 250 mg/dL 251 to 350 mg/dL   Less than 20 grams carb 0 0.5 1   21-40 grams carb 0.5 1 1.5   41-60 grams carb 1 1.5 2   More than 60 grams carb 1.5 2 2.5      3) Try for Freestyle Bre to get more information on excursions after meals.  Sent Rx to [facility] but cautioned that we may need to try at different pharmacy depending on supply.    Follow Up:  January 17 at 10:00am.        2.  Frequency of blood sugar checks:  premeal and bedtime, and as needed for hypoglycemia (6 strip per day).    3.  Continue routine follow up for autoislet transplant patients:  Mixed meal test (6 mL/kg BoostHP to max of 360 mL) at 3 months, 6 months, and once a year post transplant.  Hemoglobin A1c levels at these time points and quarterly.  4.  Other issues addressed today:  As above    Follow up:  As above        Contact me for questions at [phone] or [phone].  Emergency number to reach pediatric endocrinology after hours is [phone].        Adriana Robles MD  , Pediatric Endocrinology and Diabetes  [facility]    I spent 20 minutes face to face with Lynn, with more than 50% of that time counseling on plan of care.    Adriana Robles MD

## 2018-09-20 NOTE — PROGRESS NOTES
[facility]  Islet Autotransplant, Diabetes Follow Up    Problem List:  Patient Active Problem List   Diagnosis     Iron deficiency anemia secondary to inadequate dietary iron intake     Gastric bypass status for obesity     Health Care Home     Chronic pain syndrome     Exocrine pancreatic insufficiency     Asplenia     BLU (obstructive sleep apnea)     S/P exploratory laparotomy     Dysthymia     Anxiety     Thyroid nodule     Acquired hypothyroidism     Post-pancreatectomy diabetes (H)     E coli bacteremia     Antibiotic-associated diarrhea     Elevated LFTs       HPI:  Lynn is a 53 year old female here for follow up o total pancreatectomy, islet cell autotransplant, splenectomy, liver biopsy (needle core), choledochoduodenostomy, feeding jejunostomy performed on 5/10/2013.  At the time of the procedure, the patient received 178,100 IEQ, or 3,209 IEQ/kg body weight. She has had two subsequent exploratory laparatomy for small bowel obstruction. Other notable endocrine history includes a complex cystic nodule in mid right thyroid lobe with 1 cm maximum diameter (saw Dr Adorno in 11/2016) which needs annual follow up U/S in Nov 2017, and mild hypothyroidism followed by PCP.  She had an episode of cholangitis and was hospitalized for 4 days 8/24/17 (fevers, RUQ pain, + Ecoli blood cx).    She was on NO insulin at her 1 year, 2 year, 3 year, 4 year transplant anniversaries and restart insulin just after 4 years post-tx, insulin restart date of  6/6/2017.  She is continuing to wean narcotics, on a suboxone wean, now on a 1/8 patch (Suboxone 1- 0.5 mg film) daily, with no other narcotics.     At today's visit, overall Lynn is doing well overall.  Continues to have some struggles with anxiety.  She was recently an invited speaker on opioid addiction at a meeting.    1)  Diabetes:  Lynn continues to have partial islet graft function.  Her A1c is improved but above goal despite  recent changes.  We have BG from meter 3 times per day that look very normal, suspect excursions occur after meals. She is not having any current reactive hypoglycemia.  She saw the dietitian after last visit which was helpful for her.    Diabetes history:  Current insulin regimen:  Lantus 5 units per day  Novolog 0.5 units per 30g, and correction scale of 0.5 u per 100 >150 -- uses about 2.5 unit per day  TDD: 7.5 unit per day, total insulin on average.      Recent A1c:  Today 7.2%  Lab Results   Component Value Date    A1C 7.5 2018    A1C 6.3 2017    A1C 6.5 2017    A1C 7.0 2017    A1C 7.2 2016       Hypoglycemia history:  None recent (lowest is 75 mg/dL).  The patient has had NO episodes of severe hypoglycemia (seizure, coma, or neuroglycopenic symptoms severe enough to require assistance from another person).  Blood sugars were reviewed from the patient records and/or the meter download.    Average B mg/dL SD 27 mg/dL  Number of blood sugar checks per day 2.7 in this meter-- needs strips for 4 checks per day.     2)  GI symptoms:   Pretty good overall.  No pain and no pancreatitis pain and no pain medicines except the suboxone wean for opioid addiction.  She still has episodes of distention but these are helped by addition of Movantik.    3)  She is being managed for anxiety now      Review of systems:  Review Of Systems  Skin: negative  Eyes: negative  Ears/Nose/Throat: negative  Respiratory: No shortness of breath, dyspnea on exertion, cough, or hemoptysis  Cardiovascular: negative  Gastrointestinal: as above  Genitourinary: negative  Musculoskeletal: negative  Neurologic: negative  Psychiatric: anxiety  Hematologic/Lymphatic/Immunologic: negative  Endocrine: as above    Past Medical and Surgical History:  Past Medical History:   Diagnosis Date     Abdominal pain, epigastric , ongoing; goes to pain clinic    probable recurrent spasm sphincter of Oddi causing biliary  colic pains      Benign paroxysmal positional vertigo     occ.      Calculus of kidney 5/05    x1 on L side passed, several stones.  Has been tested for oxalate.     Chronic abdominal pain 2013     Chronic pancreatitis (H) 2013     Depressive disorder, not elsewhere classified     also occ panic spells     Diabetes (H)     post pancreatectomy     Dyspepsia and other specified disorders of function of stomach     H. pylori   treated     Headache(784.0)     still periodic HA's ;  often 5X/week     Hypertension 16    Stress related     Iron deficiency anemia secondary to inadequate dietary iron intake     relates to gastric bypass     Other chronic pain      Post-pancreatectomy diabetes (H) 2013     Pyelonephritis, unspecified , 10/8    L side     Regional enteritis of unspecified site     inacive/remission     Sleep apnea     uses Cpap     Spasm of sphincter of Oddi     ERCP with Stent of CBD by Fernandez @ [facility] with sx relief     Spasm of sphincter of Oddi     surgical + endoscopic stenting of pancreatic duct @ [facility] 06     Thyroid nodule 2016     Ureteral calculus 10/2/2012     Vaccination not carried out      Past Surgical History:   Procedure Laterality Date     APPENDECTOMY       C NONSPECIFIC PROCEDURE      R bunion     C NONSPECIFIC PROCEDURE      T & A     C NONSPECIFIC PROCEDURE      partial ileum resection     C NONSPECIFIC PROCEDURE      R ovarian cyst     C NONSPECIFIC PROCEDURE           C NONSPECIFIC PROCEDURE      GALL BLADDER     C NONSPECIFIC PROCEDURE      CBD stent; Dr. Presley     C NONSPECIFIC PROCEDURE   &     colonoscopy     C NONSPECIFIC PROCEDURE      Gastric bypass     CHOLECYSTECTOMY       COLONOSCOPY       COMBINED CYSTOSCOPY, RETROGRADES, URETEROSCOPY, LASER HOLMIUM LITHOTRIPSY URETER(S), INSERT STENT Right 3/23/2015    Procedure: COMBINED CYSTOSCOPY, RETROGRADES, URETEROSCOPY, LASER  HOLMIUM LITHOTRIPSY URETER(S), INSERT STENT;  Surgeon: Kennedi Aldana MD;  [facility]     COMBINED CYSTOSCOPY, RETROGRADES, URETEROSCOPY, LASER HOLMIUM LITHOTRIPSY URETER(S), INSERT STENT Right 4/20/2015    Procedure: COMBINED CYSTOSCOPY, RETROGRADES, URETEROSCOPY, LASER HOLMIUM LITHOTRIPSY URETER(S), INSERT STENT;  Surgeon: Kennedi Aldana MD;  Location: [facility] OR     COSMETIC SURGERY  6/24/2002    Tumyakelin badlerasck     CYSTOSCOPY, RETROGRADES, INSERT STENT URETER(S), COMBINED  10/2/2012    Procedure: COMBINED CYSTOSCOPY, RETROGRADES, INSERT STENT URETER(S);  COMBINED CYSTOSCOPY,  , INSERT LEFT STENT URETER;  Surgeon: Johny Baez MD;  Location: [facility] OR     ENT SURGERY       ESOPHAGOSCOPY, GASTROSCOPY, DUODENOSCOPY (EGD), COMBINED N/A 1/24/2018    Procedure: COMBINED ESOPHAGOSCOPY, GASTROSCOPY, DUODENOSCOPY (EGD);  ESOPHAGOSCOPY, GASTROSCOPY, DUODENOSCOPY (EGD)    ;  Surgeon: Tamir Rodgers MD;  Location:  GI     EXTRACORPOREAL SHOCK WAVE LITHOTRIPSY (ESWL)  10/16/2012    Procedure: EXTRACORPOREAL SHOCK WAVE LITHOTRIPSY (ESWL);  left EXTRACORPOREAL SHOCK WAVE LITHOTRIPSY (ESWL) ;  Surgeon: Johny Baez MD;  Location: [facility] OR     GI SURGERY  12/29/2015    Small bowel obstruction     HC LAP,LYSIS OF ADHESIONS       HERNIA REPAIR  2/2015     LAPAROSCOPIC LYSIS ADHESIONS N/A 2/20/2015    Procedure: LAPAROSCOPIC LYSIS ADHESIONS;  Surgeon: Aaron Early MD;  [facility]     LAPAROSCOPIC LYSIS ADHESIONS N/A 12/29/2015    Procedure: LAPAROSCOPIC LYSIS ADHESIONS;  Surgeon: Aaron Early MD;  [facility]     PANCREATECTOMY, TRANSPLANT AUTO ISLET CELL, COMBINED  5/10/2013    Procedure: COMBINED PANCREATECTOMY, TRANSPLANT AUTO ISLET CELL;  Pancreatectomy, Auto Islet Cell Transplant   hernia repair, jejunostomy tube and liver biopsies with Anesthesia General with block;  Surgeon: Aaron Early MD;  [facility]     TRANSPLANT  5/10/13       Family History:  New changes since last  "visit:  none  Family History   Problem Relation Age of Onset     Family History Negative Mother      Respiratory Father      COPD;  at 69     Genitourinary Problems Father      kidney stones     Substance Abuse Father      Depression Father      Asthma Father      HEART DISEASE Paternal Grandfather      M.I.     Coronary Artery Disease Paternal Grandfather      Hyperlipidemia Paternal Grandfather      Genitourinary Problems Brother      multiple brothers with kidney stones     GASTROINTESTINAL DISEASE Maternal Grandmother      undiagnosed 'gut' issues     Coronary Artery Disease Maternal Grandfather      Hyperlipidemia Maternal Grandfather      Cerebrovascular Disease Paternal Grandmother      At the age of 103     Anxiety Disorder Paternal Grandmother      Osteoporosis Paternal Grandmother      Anxiety Disorder Son      Anxiety Disorder Daughter      Asthma Daughter        Social History:  Social History     Social History Narrative     She was previously laid off from job and found that the stress reduction and getting away from work environment has helped greatly with her health.  Seeking disability.  Some stress at home including sister in law with brain tumor    Physical Exam:  Vitals: /69  Pulse 99  Temp 99.2  F (37.3  C) (Oral)  Ht 1.626 m (5' 4\")  Wt 66.2 kg (146 lb)  LMP 2013  SpO2 98%  BMI 25.06 kg/m2  BMI= Body mass index is 25.06 kg/(m^2).  General:  Well-appearing, NAD  Psych:  Communicative, with normal affect         Assessment:  1.  Post pancreatectomy diabetes mellitus, s/p total pancreatectomy and islet autotransplant.    Lynn is a 53 year old with history of chronic pancreatitis who is s/p total pancreatectomy and islet autotransplant.  She was insulin independent until 2017 (just after 4 years post-tx) and now has partial islet function.    Pre prandial BG control is excellent, suspect elevated A1c is related to post prandial excursions.  She asks about Bre which " could be very helpful to us to confirm excursions after meal.  We made insulin adjustments as below.    Plan:  1.  Changes to current diabetes regimen:  Patient Instructions     1)  Lantus:  Keep the 5 unit Lantus daily.  I will update Rx.    2)  Novolog coverage: increased dose                   What your blood glucose is before eating:   How much you plan to eat: 80- 150 mg/dL 151- 250 mg/dL 251 to 350 mg/dL   Less than 20 grams carb 0 0.5 1   21-40 grams carb 0.5 1 1.5   41-60 grams carb 1 1.5 2   More than 60 grams carb 1.5 2 2.5      3) Try for Freestyle Bre to get more information on excursions after meals.  Sent Rx to [facility] but cautioned that we may need to try at different pharmacy depending on supply.    Follow Up:  January 17 at 10:00am.        2.  Frequency of blood sugar checks:  premeal and bedtime, and as needed for hypoglycemia (6 strip per day).    3.  Continue routine follow up for autoislet transplant patients:  Mixed meal test (6 mL/kg BoostHP to max of 360 mL) at 3 months, 6 months, and once a year post transplant.  Hemoglobin A1c levels at these time points and quarterly.  4.  Other issues addressed today:  As above    Follow up:  As above        Contact me for questions at [phone] or [phone].  Emergency number to reach pediatric endocrinology after hours is [phone].        Adriana Robles MD  , Pediatric Endocrinology and Diabetes  [facility] [place]  [facility]    I spent 20 minutes face to face with Lynn, with more than 50% of that time counseling on plan of care.

## 2018-09-20 NOTE — PATIENT INSTRUCTIONS
1)  Lantus:  Keep the 5 unit Lantus daily.  I will update Rx.    2)  Novolog coverage: increased dose                   What your blood glucose is before eating:   How much you plan to eat: 80- 150 mg/dL 151- 250 mg/dL 251 to 350 mg/dL   Less than 20 grams carb 0 0.5 1   21-40 grams carb 0.5 1 1.5   41-60 grams carb 1 1.5 2   More than 60 grams carb 1.5 2 2.5      3) Try for Freestyle Bre to get more information on excursions after meals.  Sent Rx to [facility] but cautioned that we may need to try at different pharmacy depending on supply.    Follow Up:  January 17 at 10:00am.

## 2018-09-20 NOTE — NURSING NOTE
"Chief Complaint   Patient presents with     RECHECK     AI      /69  Pulse 99  Temp 99.2  F (37.3  C) (Oral)  Ht 1.626 m (5' 4\")  Wt 66.2 kg (146 lb)  LMP 12/19/2013  SpO2 98%  BMI 25.06 kg/m2  Isai Dobson, ALTHEA    "

## 2018-09-20 NOTE — MR AVS SNAPSHOT
After Visit Summary   9/20/2018    Lynn Thompson    MRN: [id number]           Patient Information     Date Of Birth          1965        Visit Information        Provider Department      9/20/2018 10:00 AM Adriana Robles MD [facility]        Care Instructions    1)  Lantus:  Keep the 5 unit Lantus daily.  I will update Rx.    2)  Novolog coverage: increased dose                   What your blood glucose is before eating:   How much you plan to eat: 80- 150 mg/dL 151- 250 mg/dL 251 to 350 mg/dL   Less than 20 grams carb 0 0.5 1   21-40 grams carb 0.5 1 1.5   41-60 grams carb 1 1.5 2   More than 60 grams carb 1.5 2 2.5        Follow Up:  January 17 at 10:00am.            Follow-ups after your visit        Follow-up notes from your care team     Return in about 4 months (around 1/20/2019).      Who to contact     If you have questions or need follow up information about today's clinic visit or your schedule please contact [facility] directly at [phone].  Normal or non-critical lab and imaging results will be communicated to you by [employer], letter or phone within 4 business days after the clinic has received the results. If you do not hear from us within 7 days, please contact the clinic through [facility] or phone. If you have a critical or abnormal lab result, we will notify you by phone as soon as possible.  Submit refill requests through [facility] or call your pharmacy and they will forward the refill request to us. Please allow 3 business days for your refill to be completed.          Additional Information About Your Visit        [facility] Information     [facility] gives you secure access to your electronic health record. If you see a primary care provider, you can also send messages to your care team and make appointments. If you have questions, please call your primary care clinic.  If you do not have a primary care provider, please call  "[phone] and they will assist you.        Care EveryWhere ID     This is your Care EveryWhere ID. This could be used by other organizations to access your [place] medical records  [id number]        Your Vitals Were     Pulse Temperature Height Last Period Pulse Oximetry BMI (Body Mass Index)    99 99.2  F (37.3  C) (Oral) 1.626 m (5' 4\") 12/19/2013 98% 25.06 kg/m2       Blood Pressure from Last 3 Encounters:   09/20/18 137/69   08/31/18 112/72   08/23/18 117/80    Weight from Last 3 Encounters:   09/20/18 66.2 kg (146 lb)   08/31/18 66.3 kg (146 lb 1.6 oz)   08/23/18 66.3 kg (146 lb 1.6 oz)              Today, you had the following     No orders found for display         Today's Medication Changes          These changes are accurate as of 9/20/18 10:25 AM.  If you have any questions, ask your nurse or doctor.               These medicines have changed or have updated prescriptions.        Dose/Directions    busPIRone 15 MG tablet   Commonly known as:  BUSPAR   This may have changed:    - how much to take  - when to take this  - additional instructions   Used for:  Anxiety        Increased dose. 30 mg two times per day.   Quantity:  120 tablet   Refills:  3                Primary Care Provider Office Phone # Fax #    Maryana Sivakumar Brooks [phone] [address] [facility] [id number]        Equal Access to Services     CHAD SOLANO AH: Nuha lauo Soyandel, waaxda luqadaha, qaybta kaalmada albin turner aderomana allen. So [facility] [phone].    ATENCIÓN: Si habla fermin, tiene a rivera disposición servicios gratuitos de asistencia lingüística. Llame al [phone].    We comply with applicable federal civil rights laws and [place] laws. We do not discriminate on the basis of race, color, national origin, age, disability, sex, sexual orientation, or gender identity.            Thank you!     Thank you for choosing [facility]  for your " care. Our goal is always to provide you with excellent care. Hearing back from our patients is one way we can continue to improve our services. Please take a few minutes to complete the written survey that you may receive in the mail after your visit with us. Thank you!             Your Updated Medication List - Protect others around you: Learn how to safely use, store and throw away your medicines at www.disposemymeds.org.          This list is accurate as of 9/20/18 10:25 AM.  Always use your most recent med list.                   Brand Name Dispense Instructions for use Diagnosis    ACETAMINOPHEN PO      Take 325 mg by mouth every 6 hours as needed for pain        amylase-lipase-protease 16810 units Tabs tablet    VIOKACE    600 tablet    Take 4-5 caps with meals and 1-2 with snacks    Pancreatic insufficiency       blood glucose lancing device     1 each    Use to test blood sugars 6 times daily or as directed.    Absence of pancreas, acquired       * blood glucose monitoring test strip    BILL CONTOUR NEXT    2 Box    Check BS 4-6 times a day    Absence of pancreas, acquired       * blood glucose monitoring test strip    no brand specified      Left foot pain       BOOST HIGH PROTEIN Liqd      After above baseline labs are drawn, give: 6 mL/kg to maximum of 360 mL; the beverage is to be consumed within 5 minutes.    Post-pancreatectomy diabetes (H)       * buprenorphine HCl-naloxone HCl 2-0.5 MG per film    SUBOXONE     Place 0.5 Film under the tongue every morning Patient states she takes an 1/8 of a strip daily.        * buprenorphine HCl-naloxone HCl 2-0.5 MG per film    SUBOXONE     Place 0.25 Film under the tongue every evening        busPIRone 15 MG tablet    BUSPAR    120 tablet    Increased dose. 30 mg two times per day.    Anxiety       cefuroxime 500 MG tablet    CEFTIN    20 tablet    Take 1 tablet (500 mg) by mouth 2 times daily    Acute sinusitis with symptoms > 10 days       CLONIDINE HCL PO       Take 0.1 mg by mouth 2 times daily as needed        DOCUSATE SODIUM PO      Take 100 mg by mouth daily        DULoxetine 30 MG EC capsule    CYMBALTA    180 capsule    Take 1 capsule (30 mg) by mouth 2 times daily Fill at patient request.    Anxiety       escitalopram 10 MG tablet    LEXAPRO      Left foot pain       ESTRACE VAGINAL 0.1 MG/GM cream   Generic drug:  estradiol     42.5 g    PLACE 2 GRAMS VAGINALLY TWICE A WEEK    Vaginal atrophy       Estradiol-Norethindrone Acet 0.5-0.1 MG Tabs     84 tablet    TAKE ONE TABLET BY MOUTH DAILY    Decreased libido       gabapentin 300 MG capsule    NEURONTIN          glucose 40 % (400 mg/mL) Gel gel     1 Tube    Take 15-30 g by mouth every 15 minutes as needed.    Chronic pancreatitis (H)       insulin aspart 100 UNITS/ML injection    NovoLOG penFILL    3 mL    Inject 0.5-2 units 4 times daily    Diabetes mellitus type 1 (H)       insulin glargine 100 UNIT/ML injection    LANTUS SOLOSTAR    15 mL    Inject 5 Units Subcutaneous every evening    Post-pancreatectomy diabetes (H)       insulin pen needle 32G X 4 MM    BD MAHESH U/F    100 each    Use up to 3 times daily as needed for insulin dosing    Acquired absence of pancreas       levothyroxine 88 MCG tablet    SYNTHROID/LEVOTHROID    90 tablet    Take 1 tablet (88 mcg) by mouth daily    Acquired hypothyroidism       loratadine 10 MG tablet    CLARITIN     Take 10 mg by mouth daily as needed        LORazepam 0.5 MG tablet    ATIVAN    30 tablet    Take 1 tablet (0.5 mg) by mouth every 8 hours as needed for anxiety    Anxiety       modafinil 200 MG tablet    PROVIGIL     Take 2 tablets by mouth Once a Day        MOVANTIK 25 MG Tabs tablet   Generic drug:  naloxegol      Take 25 mg by mouth every morning (before breakfast)        naloxone nasal spray    NARCAN     Spray 4 mg in nostril as needed for opioid reversal        ELKE Royal (GREEN)   Generic drug:  Injection Device for insulin      1 Device Inject 1 unit  (which is marked as a 1/2 unit on the pen itself)  as needed when eating carb heavy meals.        omeprazole 40 MG capsule    priLOSEC    180 capsule    Take 1 capsule (40 mg) by mouth 2 times daily Take 30-60 minutes before a meal.    Gastroesophageal reflux disease without esophagitis, Nausea, Gastric bypass status for obesity       ondansetron 4 MG ODT tab    ZOFRAN ODT    20 tablet    Take 1-2 tablets (4-8 mg) by mouth 3 times daily (before meals)    Nausea       polyethylene glycol Packet    MIRALAX/GLYCOLAX    7 packet    Take 17 g by mouth daily    Constipation       ranitidine 150 MG tablet    ZANTAC    180 tablet    Take 1 tablet (150 mg) by mouth 2 times daily    Nausea       sennosides 8.6 MG tablet    SENOKOT    120 tablet    Take 2 tablets by mouth 2 times daily    Other constipation       Sharps Container Misc     1 each    For insulin needles    Absence of pancreas, acquired       traZODone 50 MG tablet    DESYREL    90 tablet    Take 1 tablet (50 mg) by mouth At Bedtime    Persistent insomnia       VISTARIL PO      Take 25 mg by mouth every 6 hours as needed for itching        * Notice:  This list has 4 medication(s) that are the same as other medications prescribed for you. Read the directions carefully, and ask your doctor or other care provider to review them with you.

## 2018-10-01 DIAGNOSIS — E03.9 ACQUIRED HYPOTHYROIDISM: ICD-10-CM

## 2018-10-01 RX ORDER — LEVOTHYROXINE SODIUM 88 UG/1
88 TABLET ORAL DAILY
Qty: 90 TABLET | Refills: 0 | Status: SHIPPED | OUTPATIENT
Start: 2018-10-01 | End: 2018-12-31

## 2018-10-01 NOTE — TELEPHONE ENCOUNTER
"Requested Prescriptions   Pending Prescriptions Disp Refills     levothyroxine (SYNTHROID/LEVOTHROID) 88 MCG tablet 90 tablet 1    Last Written Prescription Date:  04/03/2018  Last Fill Quantity: 90 tablet,  # refills: 1   Last office visit: 8/23/2018 with prescribing provider:  08/23/2018   Future Office Visit:     Sig: Take 1 tablet (88 mcg) by mouth daily    Thyroid Protocol Passed    10/1/2018  8:58 AM       Passed - Patient is 12 years or older       Passed - Recent (12 mo) or future (30 days) visit within the authorizing provider's specialty    Patient had office visit in the last 12 months or has a visit in the next 30 days with authorizing provider or within the authorizing provider's specialty.  See \"Patient Info\" tab in inbasket, or \"Choose Columns\" in Meds & Orders section of the refill encounter.           Passed - Normal TSH on file in past 12 months    Recent Labs   Lab Test  03/26/18   0816   TSH  3.27             Passed - No active pregnancy on record    If patient is pregnant or has had a positive pregnancy test, please check TSH.         Passed - No positive pregnancy test in past 12 months    If patient is pregnant or has had a positive pregnancy test, please check TSH.            Edy Avilez XRT  "

## 2018-10-08 ENCOUNTER — HOSPITAL ENCOUNTER (EMERGENCY)
Facility: CLINIC | Age: 53
Discharge: HOME | End: 2018-10-09
Attending: EMERGENCY MEDICINE | Admitting: EMERGENCY MEDICINE
Payer: COMMERCIAL

## 2018-10-08 DIAGNOSIS — R19.7 DIARRHEA, UNSPECIFIED TYPE: ICD-10-CM

## 2018-10-08 DIAGNOSIS — R10.84 ABDOMINAL PAIN, GENERALIZED: ICD-10-CM

## 2018-10-08 LAB
ALBUMIN SERPL-MCNC: 3.6 G/DL (ref 3.4–5)
ALP SERPL-CCNC: 113 U/L (ref 40–150)
ALT SERPL W P-5'-P-CCNC: 45 U/L (ref 0–50)
ANION GAP SERPL CALCULATED.3IONS-SCNC: 5 MMOL/L (ref 3–14)
AST SERPL W P-5'-P-CCNC: 29 U/L (ref 0–45)
BASOPHILS # BLD AUTO: 0.1 10E9/L (ref 0–0.2)
BASOPHILS NFR BLD AUTO: 0.6 %
BILIRUB SERPL-MCNC: 0.2 MG/DL (ref 0.2–1.3)
BUN SERPL-MCNC: 18 MG/DL (ref 7–30)
CALCIUM SERPL-MCNC: 8.9 MG/DL (ref 8.5–10.1)
CHLORIDE SERPL-SCNC: 104 MMOL/L (ref 94–109)
CO2 SERPL-SCNC: 31 MMOL/L (ref 20–32)
CREAT SERPL-MCNC: 0.91 MG/DL (ref 0.52–1.04)
DIFFERENTIAL METHOD BLD: ABNORMAL
EOSINOPHIL # BLD AUTO: 0.2 10E9/L (ref 0–0.7)
EOSINOPHIL NFR BLD AUTO: 2.8 %
ERYTHROCYTE [DISTWIDTH] IN BLOOD BY AUTOMATED COUNT: 13.2 % (ref 10–15)
GFR SERPL CREATININE-BSD FRML MDRD: 64 ML/MIN/1.7M2
GLUCOSE SERPL-MCNC: 94 MG/DL (ref 70–99)
HCT VFR BLD AUTO: 43.9 % (ref 35–47)
HGB BLD-MCNC: 13.8 G/DL (ref 11.7–15.7)
IMM GRANULOCYTES # BLD: 0 10E9/L (ref 0–0.4)
IMM GRANULOCYTES NFR BLD: 0.2 %
LACTATE BLD-SCNC: 1.2 MMOL/L (ref 0.7–2)
LIPASE SERPL-CCNC: 44 U/L (ref 73–393)
LYMPHOCYTES # BLD AUTO: 2.9 10E9/L (ref 0.8–5.3)
LYMPHOCYTES NFR BLD AUTO: 33.8 %
MCH RBC QN AUTO: 29.6 PG (ref 26.5–33)
MCHC RBC AUTO-ENTMCNC: 31.4 G/DL (ref 31.5–36.5)
MCV RBC AUTO: 94 FL (ref 78–100)
MONOCYTES # BLD AUTO: 0.9 10E9/L (ref 0–1.3)
MONOCYTES NFR BLD AUTO: 10.2 %
NEUTROPHILS # BLD AUTO: 4.6 10E9/L (ref 1.6–8.3)
NEUTROPHILS NFR BLD AUTO: 52.4 %
NRBC # BLD AUTO: 0 10*3/UL
NRBC BLD AUTO-RTO: 0 /100
PLATELET # BLD AUTO: 392 10E9/L (ref 150–450)
POTASSIUM SERPL-SCNC: 4.3 MMOL/L (ref 3.4–5.3)
PROT SERPL-MCNC: 7.7 G/DL (ref 6.8–8.8)
RBC # BLD AUTO: 4.67 10E12/L (ref 3.8–5.2)
SODIUM SERPL-SCNC: 140 MMOL/L (ref 133–144)
WBC # BLD AUTO: 8.7 10E9/L (ref 4–11)

## 2018-10-08 PROCEDURE — 87040 BLOOD CULTURE FOR BACTERIA: CPT | Performed by: EMERGENCY MEDICINE

## 2018-10-08 PROCEDURE — 85025 COMPLETE CBC W/AUTO DIFF WBC: CPT | Performed by: EMERGENCY MEDICINE

## 2018-10-08 PROCEDURE — 80053 COMPREHEN METABOLIC PANEL: CPT | Performed by: EMERGENCY MEDICINE

## 2018-10-08 PROCEDURE — 25000128 H RX IP 250 OP 636: Performed by: EMERGENCY MEDICINE

## 2018-10-08 PROCEDURE — 96375 TX/PRO/DX INJ NEW DRUG ADDON: CPT

## 2018-10-08 PROCEDURE — 96374 THER/PROPH/DIAG INJ IV PUSH: CPT | Mod: 59

## 2018-10-08 PROCEDURE — 83605 ASSAY OF LACTIC ACID: CPT | Performed by: EMERGENCY MEDICINE

## 2018-10-08 PROCEDURE — 99285 EMERGENCY DEPT VISIT HI MDM: CPT | Mod: 25

## 2018-10-08 PROCEDURE — 96361 HYDRATE IV INFUSION ADD-ON: CPT

## 2018-10-08 PROCEDURE — 83690 ASSAY OF LIPASE: CPT | Performed by: EMERGENCY MEDICINE

## 2018-10-08 RX ORDER — SODIUM CHLORIDE 9 MG/ML
1000 INJECTION, SOLUTION INTRAVENOUS CONTINUOUS
Status: DISCONTINUED | OUTPATIENT
Start: 2018-10-08 | End: 2018-10-09 | Stop reason: HOSPADM

## 2018-10-08 RX ORDER — ONDANSETRON 2 MG/ML
4 INJECTION INTRAMUSCULAR; INTRAVENOUS EVERY 30 MIN PRN
Status: DISCONTINUED | OUTPATIENT
Start: 2018-10-08 | End: 2018-10-09 | Stop reason: HOSPADM

## 2018-10-08 RX ORDER — HYDROMORPHONE HYDROCHLORIDE 1 MG/ML
0.5 INJECTION, SOLUTION INTRAMUSCULAR; INTRAVENOUS; SUBCUTANEOUS
Status: DISCONTINUED | OUTPATIENT
Start: 2018-10-08 | End: 2018-10-09 | Stop reason: HOSPADM

## 2018-10-08 RX ADMIN — SODIUM CHLORIDE 1000 ML: 9 INJECTION, SOLUTION INTRAVENOUS at 22:51

## 2018-10-08 RX ADMIN — Medication 0.5 MG: at 22:52

## 2018-10-08 RX ADMIN — ONDANSETRON 4 MG: 2 INJECTION INTRAMUSCULAR; INTRAVENOUS at 22:51

## 2018-10-08 NOTE — ED AVS SNAPSHOT
[facility] Emergency Department    [address]    [facility] [address]    Phone:  [phone]    Fax:  [phone]                                       Lynn Thompson   MRN: [id number]    Department:  [facility] Emergency Department   Date of Visit:  10/8/2018           Patient Information     Date Of Birth          1965        Your diagnoses for this visit were:     Abdominal pain, generalized     Diarrhea, unspecified type        You were seen by Alen Zaragoza MD.      Follow-up Information     Follow up with Maryana Brooks MD In 2 days.    Specialty:  Family Practice    Why:  and with your GI doctors at the [facility].    Contact information:    [address]  [facility] [address] [phone]          Discharge Instructions       Discharge Instructions  Abdominal Pain    Abdominal pain can be caused by many things. Your evaluation today does not show the exact cause for your pain. Your doctor today has decided that it is unlikely your pain is due to a life threatening problem, or a problem requiring surgery or hospital admission. Sometimes those problems cannot be found right away, so it is very important that you follow up as directed.  Sometimes only the changes which occur over time allow the cause of your pain to be found.    Return to the Emergency Department for a recheck in 8-12 hours if your pain continues.  If your pain gets worse, changes in location, or feels different, return to the Emergency Department right away.    ADULTS:  Return to the Emergency Department right away if:      You get an oral temperature above 102oF or as directed by your doctor.    You have blood in your stools (bright red or black, tarry stools).    You keep throwing up or can t drink liquids.    You see blood when you throw up.    You can t have a bowel movement or you can t pass gas.    Your stomach gets bloated or bigger.    Your skin or the whites of your  "eyes look yellow.    You faint.    You have bloody, frequent or painful urination.    You have new symptoms or anything that worries you.    MORE INFORMATION:    Appendicitis:  A possible cause of abdominal pain in any person who still has their appendix is acute appendicitis. Appendicitis is often hard to diagnose.  Testing does not always rule out early appendicitis or other causes of abdominal pain. Close follow-up with your doctor and re-evaluations may be needed to figure out the reason for your abdominal pain.    Follow-up:  It is very important that you make an appointment with your clinic and go to the appointment.  If you do not follow-up with your primary doctor, it may result in missing an important development which could result in permanent injury or disability and/or lasting pain.  If there is any problem keeping your appointment, call your doctor or return to the Emergency Department.    Medications:  Take your medications as directed by your doctor today.  Before using over-the-counter medications, ask your doctor and make sure to take the medications as directed.  If you have any questions about medications, ask your doctor.    Diet:  Resume your normal diet as much as possible, but do not eat fried, fatty or spicy foods while you have pain.  Do not drink alcohol or have caffeine.  Do not smoke tobacco.    Probiotics: If you have been given an antibiotic, you may want to also take a probiotic pill or eat yogurt with live cultures. Probiotics have \"good bacteria\" to help your intestines stay healthy. Studies have shown that probiotics help prevent diarrhea and other intestine problems (including C. diff infection) when you take antibiotics. You can buy these without a prescription in the pharmacy section of the store.     If you were given a prescription for medicine here today, be sure to read all of the information (including the package insert) that comes with your prescription.  This will include " important information about the medicine, its side effects, and any warnings that you need to know about.  The pharmacist who fills the prescription can provide more information and answer questions you may have about the medicine.  If you have questions or concerns that the pharmacist cannot address, please call or return to the Emergency Department.         Opioid Medication Information    Pain medications are among the most commonly prescribed medicines, so we are including this information for all our patients. If you did not receive pain medication or get a prescription for pain medicine, you can ignore it.     You may have been given a prescription for an opioid (narcotic) pain medicine and/or have received a pain medicine while here in the Emergency Department. These medicines can make you drowsy or impaired. You must not drive, operate dangerous equipment, or engage in any other dangerous activities while taking these medications. If you drive while taking these medications, you could be arrested for DUI, or driving under the influence. Do not drink any alcohol while you are taking these medications.     Opioid pain medications can cause addiction. If you have a history of chemical dependency of any type, you are at a higher risk of becoming addicted to pain medications.  Only take these prescribed medications to treat your pain when all other options have been tried. Take it for as short a time and as few doses as possible. Store your pain pills in a secure place, as they are frequently stolen and provide a dangerous opportunity for children or visitors in your house to start abusing these powerful medications. We will not replace any lost or stolen medicine.  As soon as your pain is better, you should flush all your remaining medication.     Many prescription pain medications contain Tylenol  (acetaminophen), including Vicodin , Tylenol #3 , Norco , Lortab , and Percocet .  You should not take any extra  pills of Tylenol  if you are using these prescription medications or you can get very sick.  Do not ever take more than 3000 mg of acetaminophen in any 24 hour period.    All opioids tend to cause constipation. Drink plenty of water and eat foods that have a lot of fiber, such as fruits, vegetables, prune juice, apple juice and high fiber cereal.  Take a laxative if you don t move your bowels at least every other day. Miralax , Milk of Magnesia, Colace , or Senna  can be used to keep you regular.      Remember that you can always come back to the Emergency Department if you are not able to see your regular doctor in the amount of time listed above, if you get any new symptoms, or if there is anything that worries you.          Your next 10 appointments already scheduled     Oct 16, 2018  8:30 AM CDT   Return Visit with Manuel Chan DPM   [facility] ([facility])    [address]  [address]  [facility] [id number] [phone]            Jan 17, 2019 10:00 AM CST   (Arrive by 9:45 AM)   Return Auto [facility]let with Adriana Robles MD   [facility] ([facility])    [address]  [address]  [facility] [address] [phone]              24 Hour Appointment Hotline       To make an appointment at any [facility], call [phone] ([phone]). If you don't have a family doctor or clinic, we will help you find one. [facility] are conveniently located to serve the needs of you and your family.          ED Discharge Orders     Clostridium difficile toxin B           Enteric Bacteria and Virus Panel by DILMA Stool                    Review of your medicines      Our records show that you are taking the medicines listed below. If these are incorrect, please call your family doctor or clinic.        Dose / Directions Last dose taken    ACETAMINOPHEN PO   Dose:  325 mg        Take 325 mg by mouth every 6 hours  as needed for pain   Refills:  0        amylase-lipase-protease 79313 units Tabs tablet   Commonly known as:  VIOKACE   Quantity:  600 tablet        Take 4-5 caps with meals and 1-2 with snacks   Refills:  5        blood glucose lancing device   Quantity:  1 each        Use to test blood sugars 6 times daily or as directed.   Refills:  11        * blood glucose monitoring test strip   Commonly known as:  BILL CONTOUR NEXT   Quantity:  2 Box        Check BS 4-6 times a day   Refills:  6        * blood glucose monitoring test strip   Commonly known as:  no brand specified        Refills:  0        BOOST HIGH PROTEIN Liqd        After above baseline labs are drawn, give: 6 mL/kg to maximum of 360 mL; the beverage is to be consumed within 5 minutes.   Refills:  0        buprenorphine HCl-naloxone HCl 2-0.5 MG per film   Commonly known as:  SUBOXONE   Dose:  0.5 Film        Place 0.5 Film under the tongue every morning Patient states she takes an 1/8 of a strip daily.   Refills:  0        busPIRone 15 MG tablet   Commonly known as:  BUSPAR   Quantity:  120 tablet        Increased dose. 30 mg two times per day.   Refills:  3        cefuroxime 500 MG tablet   Commonly known as:  CEFTIN   Dose:  500 mg   Quantity:  20 tablet        Take 1 tablet (500 mg) by mouth 2 times daily   Refills:  0        CLONIDINE HCL PO   Dose:  0.1 mg        Take 0.1 mg by mouth 2 times daily as needed   Refills:  0        continuous blood glucose monitoring sensor   Quantity:  3 each        For use with Freestyle Bre Flash  for continuous monitioring of blood glucose levels. Replace sensor every 10 days.   Refills:  11        DOCUSATE SODIUM PO   Dose:  100 mg        Take 100 mg by mouth daily   Refills:  0        DULoxetine 30 MG EC capsule   Commonly known as:  CYMBALTA   Dose:  30 mg   Quantity:  180 capsule        Take 1 capsule (30 mg) by mouth 2 times daily Fill at patient request.   Refills:  1        escitalopram 10 MG tablet    Commonly known as:  LEXAPRO        Refills:  2        ESTRACE VAGINAL 0.1 MG/GM cream   Quantity:  42.5 g   Generic drug:  estradiol        PLACE 2 GRAMS VAGINALLY TWICE A WEEK   Refills:  10        Estradiol-Norethindrone Acet 0.5-0.1 MG Tabs   Quantity:  84 tablet        TAKE ONE TABLET BY MOUTH DAILY   Refills:  0        FREESTYLE ADAM READER Genny   Dose:  1 each   Quantity:  1 Device        1 each 3 times daily (before meals) Use with adam sensor to check BG before meals and as needed   Refills:  0        gabapentin 300 MG capsule   Commonly known as:  NEURONTIN        Refills:  0        glucose 40 % (400 mg/mL) Gel gel   Dose:  15-30 g   Quantity:  1 Tube        Take 15-30 g by mouth every 15 minutes as needed.   Refills:  11        insulin aspart 100 UNITS/ML injection   Commonly known as:  NovoLOG penFILL   Quantity:  3 mL        Inject 0.5-2 units 4 times daily   Refills:  3        insulin glargine 100 UNIT/ML injection   Commonly known as:  LANTUS SOLOSTAR   Dose:  5 Units   Quantity:  15 mL        Inject 5 Units Subcutaneous every evening   Refills:  3        insulin pen needle 32G X 4 MM   Commonly known as:  BD MAHESH U/F   Quantity:  100 each        Use up to 3 times daily as needed for insulin dosing   Refills:  prn        levothyroxine 88 MCG tablet   Commonly known as:  SYNTHROID/LEVOTHROID   Dose:  88 mcg   Quantity:  90 tablet        Take 1 tablet (88 mcg) by mouth daily   Refills:  0        loratadine 10 MG tablet   Commonly known as:  CLARITIN   Dose:  10 mg   Indication:  Hayfever        Take 10 mg by mouth daily as needed   Refills:  0        LORazepam 0.5 MG tablet   Commonly known as:  ATIVAN   Dose:  0.5 mg   Quantity:  30 tablet        Take 1 tablet (0.5 mg) by mouth every 8 hours as needed for anxiety   Refills:  3        modafinil 200 MG tablet   Commonly known as:  PROVIGIL   Dose:  2 tablet        Take 2 tablets by mouth Once a Day   Refills:  0        MOVANTIK 25 MG Tabs tablet    Dose:  25 mg   Generic drug:  naloxegol        Take 25 mg by mouth every morning (before breakfast)   Refills:  0        naloxone nasal spray   Commonly known as:  NARCAN   Dose:  4 mg        Spray 4 mg in nostril as needed for opioid reversal   Refills:  0        NOVOPEN JR (GREEN) Genny   Dose:  1 Device   Generic drug:  Injection Device for insulin        1 Device Inject 1 unit (which is marked as a 1/2 unit on the pen itself)  as needed when eating carb heavy meals.   Refills:  0        omeprazole 40 MG capsule   Commonly known as:  priLOSEC   Dose:  40 mg   Quantity:  180 capsule        Take 1 capsule (40 mg) by mouth 2 times daily Take 30-60 minutes before a meal.   Refills:  1        ondansetron 4 MG ODT tab   Commonly known as:  ZOFRAN ODT   Dose:  4-8 mg   Quantity:  20 tablet        Take 1-2 tablets (4-8 mg) by mouth 3 times daily (before meals)   Refills:  3        polyethylene glycol Packet   Commonly known as:  MIRALAX/GLYCOLAX   Dose:  17 g   Quantity:  7 packet        Take 17 g by mouth daily   Refills:  0        ranitidine 150 MG tablet   Commonly known as:  ZANTAC   Dose:  150 mg   Quantity:  180 tablet        Take 1 tablet (150 mg) by mouth 2 times daily   Refills:  2        sennosides 8.6 MG tablet   Commonly known as:  SENOKOT   Dose:  2 tablet   Quantity:  120 tablet        Take 2 tablets by mouth 2 times daily   Refills:  1        Sharps Container Misc   Quantity:  1 each        For insulin needles   Refills:  prn        traZODone 50 MG tablet   Commonly known as:  DESYREL   Dose:  50 mg   Quantity:  90 tablet        Take 1 tablet (50 mg) by mouth At Bedtime   Refills:  3        VISTARIL PO   Dose:  25 mg        Take 25 mg by mouth every 6 hours as needed for itching   Refills:  0        * Notice:  This list has 2 medication(s) that are the same as other medications prescribed for you. Read the directions carefully, and ask your doctor or other care provider to review them with you.             Procedures and tests performed during your visit     Procedure/Test Number of Times Performed    Blood culture 2    CBC with platelets differential 1    CT Abdomen Pelvis w Contrast 1    Comprehensive metabolic panel 1    Give 20 ounces of water 15 minutes before CT of abdomen 1    Lactic acid whole blood 1    Lipase 1    Peripheral IV catheter 1      Orders Needing Specimen Collection     Ordered          10/08/18 2243  UA with Microscopic - STAT, Prio: STAT, Status: Sent     Scheduled Task Status   10/08/18 2244 [employer] [facility] Reminder: Open   Order Class:  PCU Collect                  Pending Results     Date and Time Order Name Status Description    10/8/2018 2259 Blood culture In process     10/8/2018 2259 Blood culture In process             Pending Culture Results     Date and Time Order Name Status Description    10/8/2018 2259 Blood culture In process     10/8/2018 2259 Blood culture In process             Pending Results Instructions     If you had any lab results that were not finalized at the time of your Discharge, you can call the ED Lab Result RN at [phone]. You will be contacted by this team for any positive Lab results or changes in treatment. The nurses are available 7 days a week from 10A to 6:30P.  You can leave a message 24 hours per day and they will return your call.        Test Results From Your Hospital Stay        10/8/2018 10:54 PM      Component Results     Component Value Ref Range & Units Status    WBC 8.7 4.0 - 11.0 10e9/L Final    RBC Count 4.67 3.8 - 5.2 10e12/L Final    Hemoglobin 13.8 11.7 - 15.7 g/dL Final    Hematocrit 43.9 35.0 - 47.0 % Final    MCV 94 78 - 100 fl Final    MCH 29.6 26.5 - 33.0 pg Final    MCHC 31.4 (L) 31.5 - 36.5 g/dL Final    RDW 13.2 10.0 - 15.0 % Final    Platelet Count 392 150 - 450 10e9/L Final    Diff Method Automated Method  Final    % Neutrophils 52.4 % Final    % Lymphocytes 33.8 % Final    % Monocytes 10.2 % Final    % Eosinophils 2.8 % Final    %  Basophils 0.6 % Final    % Immature Granulocytes 0.2 % Final    Nucleated RBCs 0 0 /100 Final    Absolute Neutrophil 4.6 1.6 - 8.3 10e9/L Final    Absolute Lymphocytes 2.9 0.8 - 5.3 10e9/L Final    Absolute Monocytes 0.9 0.0 - 1.3 10e9/L Final    Absolute Eosinophils 0.2 0.0 - 0.7 10e9/L Final    Absolute Basophils 0.1 0.0 - 0.2 10e9/L Final    Abs Immature Granulocytes 0.0 0 - 0.4 10e9/L Final    Absolute Nucleated RBC 0.0  Final         10/8/2018 11:12 PM      Component Results     Component Value Ref Range & Units Status    Sodium 140 133 - 144 mmol/L Final    Potassium 4.3 3.4 - 5.3 mmol/L Final    Chloride 104 94 - 109 mmol/L Final    Carbon Dioxide 31 20 - 32 mmol/L Final    Anion Gap 5 3 - 14 mmol/L Final    Glucose 94 70 - 99 mg/dL Final    Urea Nitrogen 18 7 - 30 mg/dL Final    Creatinine 0.91 0.52 - 1.04 mg/dL Final    GFR Estimate 64 >60 mL/min/1.7m2 Final    Non  GFR Calc    GFR Estimate If Black 78 >60 mL/min/1.7m2 Final    African American GFR Calc    Calcium 8.9 8.5 - 10.1 mg/dL Final    Bilirubin Total 0.2 0.2 - 1.3 mg/dL Final    Albumin 3.6 3.4 - 5.0 g/dL Final    Protein Total 7.7 6.8 - 8.8 g/dL Final    Alkaline Phosphatase 113 40 - 150 U/L Final    ALT 45 0 - 50 U/L Final    AST 29 0 - 45 U/L Final         10/8/2018 11:12 PM      Component Results     Component Value Ref Range & Units Status    Lipase 44 (L) 73 - 393 U/L Final         10/8/2018 10:54 PM      Component Results     Component Value Ref Range & Units Status    Lactic Acid 1.2 0.7 - 2.0 mmol/L Final         10/8/2018 11:33 PM         10/8/2018 11:44 PM         10/9/2018  1:18 AM      Narrative     CT ABDOMEN PELVIS W CONTRAST  10/9/2018 12:51 AM     HISTORY: Abdominal pain. 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 Feeding  jejunostomy. Exploratory laparotomies for small bowel obstruction.    TECHNIQUE: Volumetric acquisition through abdomen and pelvis with  IV  contrast. 71 mL Isovue-370. Radiation dose for this scan was reduced  using automated exposure control, adjustment of the mA and/or kV  according to patient size, or iterative reconstruction technique.    COMPARISON: 8/24/2017.    FINDINGS: Gallbladder, pancreas, spleen and a portion of the stomach  have been resected. Mild pneumobilia, not present previously. No  significant bile duct dilatation. No focal liver lesions. Adrenal  glands and kidneys demonstrate no worrisome findings. Tiny  nonobstructing left renal stone.    Visualized lung bases are clear.    Uterus is present. No suspicious adnexal masses. Large amount of stool  in the colon. Moderate fluid in small bowel loops and in the right  colon, but no evidence of bowel obstruction. Numerous small mesenteric  lymph nodes unchanged. No ascites or free air.        Impression     IMPRESSION:  1. Moderate small bowel and right colonic fluid, nonspecific but could  be due to enteritis. Moderate stool in the colon. No bowel  obstruction.  2. No other acute cause of pain identified.  3. Extensive postoperative changes as described. Pneumobilia.    OCTAVIANO MENDOZA MD                Clinical Quality Measure: Blood Pressure Screening     Your blood pressure was checked while you were in the emergency department today. The last reading we obtained was  BP: 125/73 . Please read the guidelines below about what these numbers mean and what you should do about them.  If your systolic blood pressure (the top number) is less than 120 and your diastolic blood pressure (the bottom number) is less than 80, then your blood pressure is normal. There is nothing more that you need to do about it.  If your systolic blood pressure (the top number) is 120-139 or your diastolic blood pressure (the bottom number) is 80-89, your blood pressure may be higher than it should be. You should have your blood pressure rechecked within a year by a primary care provider.  If your systolic  blood pressure (the top number) is 140 or greater or your diastolic blood pressure (the bottom number) is 90 or greater, you may have high blood pressure. High blood pressure is treatable, but if left untreated over time it can put you at risk for heart attack, stroke, or kidney failure. You should have your blood pressure rechecked by a primary care provider within the next 4 weeks.  If your provider in the emergency department today gave you specific instructions to follow-up with your doctor or provider even sooner than that, you should follow that instruction and not wait for up to 4 weeks for your follow-up visit.        Thank you for choosing [facility]       Thank you for choosing [facility] for your care. Our goal is always to provide you with excellent care. Hearing back from our patients is one way we can continue to improve our services. Please take a few minutes to complete the written survey that you may receive in the mail after you visit with us. Thank you!        [employer] Information     [employer] gives you secure access to your electronic health record. If you see a primary care provider, you can also send messages to your care team and make appointments. If you have questions, please call your primary care clinic.  If you do not have a primary care provider, please call [phone] and they will assist you.        Care EveryWhere ID     This is your Care EveryWhere ID. This could be used by other organizations to access your [facility] medical records  [id number]        Equal Access to Services     CHAD SOLANO : Hadii kapil Tolentino, waaxda luqadaha, qaybta kaalmaviolet turner, albin allen. So [facility] [phone].    ATENCIÓN: Si habla español, tiene a rivera disposición servicios gratuitos de asistencia lingüística. Llame al [phone].    We comply with applicable federal civil rights laws and [place] laws. We do not discriminate on the basis of race, color,  national origin, age, disability, sex, sexual orientation, or gender identity.            After Visit Summary       This is your record. Keep this with you and show to your community pharmacist(s) and doctor(s) at your next visit.

## 2018-10-08 NOTE — ED AVS SNAPSHOT
[facility] Emergency Department    [address]    [facility] [address]    Phone:  [phone]    Fax:  [phone]                                       Lynn Thompson   MRN: [id number]    Department:  [facility] Emergency Department   Date of Visit:  10/8/2018           After Visit Summary Signature Page     I have received my discharge instructions, and my questions have been answered. I have discussed any challenges I see with this plan with the nurse or doctor.    ..........................................................................................................................................  Patient/Patient Representative Signature      ..........................................................................................................................................  Patient Representative Print Name and Relationship to Patient    ..................................................               ................................................  Date                                   Time    ..........................................................................................................................................  Reviewed by Signature/Title    ...................................................              ..............................................  Date                                               Time          22EPIC Rev 08/18

## 2018-10-09 ENCOUNTER — APPOINTMENT (OUTPATIENT)
Dept: CT IMAGING | Facility: CLINIC | Age: 53
End: 2018-10-09
Attending: EMERGENCY MEDICINE
Payer: COMMERCIAL

## 2018-10-09 VITALS
RESPIRATION RATE: 18 BRPM | TEMPERATURE: 96.6 F | WEIGHT: 140 LBS | SYSTOLIC BLOOD PRESSURE: 125 MMHG | OXYGEN SATURATION: 100 % | DIASTOLIC BLOOD PRESSURE: 73 MMHG | HEART RATE: 119 BPM | BODY MASS INDEX: 24.03 KG/M2

## 2018-10-09 PROCEDURE — 25000128 H RX IP 250 OP 636: Performed by: EMERGENCY MEDICINE

## 2018-10-09 PROCEDURE — 96376 TX/PRO/DX INJ SAME DRUG ADON: CPT

## 2018-10-09 PROCEDURE — 74177 CT ABD & PELVIS W/CONTRAST: CPT

## 2018-10-09 RX ORDER — IOPAMIDOL 755 MG/ML
500 INJECTION, SOLUTION INTRAVASCULAR ONCE
Status: COMPLETED | OUTPATIENT
Start: 2018-10-09 | End: 2018-10-09

## 2018-10-09 RX ADMIN — ONDANSETRON 4 MG: 2 INJECTION INTRAMUSCULAR; INTRAVENOUS at 01:07

## 2018-10-09 RX ADMIN — Medication 0.5 MG: at 01:09

## 2018-10-09 RX ADMIN — SODIUM CHLORIDE 59 ML: 9 INJECTION, SOLUTION INTRAVENOUS at 00:47

## 2018-10-09 RX ADMIN — IOPAMIDOL 71 ML: 755 INJECTION, SOLUTION INTRAVENOUS at 00:47

## 2018-10-09 NOTE — ED PROVIDER NOTES
History     Chief Complaint:  Abdominal Pain      HPI   This is a 53 year old female with a history of total pancreatectomy, islet cell autotransplant, splenectomy, choledochoduodenostomy, feeding jejunostomy performed on 5/10/2013.  She has had two subsequent exploratory laparatomy for small bowel obstruction. She had an episode of cholangitis and was hospitalized for 4 days 8/24/17 (fevers, RUQ pain, + Ecoli blood cx).     She was off insulin for several years after her islet cell transplant, but had to restart insulin just after 4 years post-tx, insulin restart date of  6/6/2017.  She is weaning off chronic narcotics, on a suboxone wean, now on a 1/8 patch (Suboxone 1- 0.5 mg film) daily, with no other narcotics.    The patient states that she has had nausea, vomiting, and diarrhea for the past two weeks so she's been eating bland fluids and consuming clear liquids, and originally attributed it to the flu. However, tonight she made a meatloaf and then had worsening of her nausea, vomiting, diarrhea, and upper left quadrant abdominal pain to the point that it was unbearable which prompted her evaluation to the ED. She notes these symptoms are similar to when she was hospitalized in the past for sepsis which they attributed to a clogged bile duct due to not having a gallbladder, spleen, or pancreas, and also similar to when she had bowel obstruction.    Allergies:  Povidone Iodine  Corticosteroids  NSAIDS  Sulfasalazine      Medications:    Viokace  Josie   Suboxone  Buspar  Ceftin  Clonidine  Docusate sodium  Cymbalta  Lexapro  Estrace vaginal  Estradiol-Norethindrone  Neurontin  Vistaril  Novopen  Novolog Penfill  Lantus Solostar  Insulin pen  Levothyroxine  Claritin  Ativan  Provigil  Movantik  Narcan  Prilosec  Zofran  Miralax  Zantac  Senokot  Desyrel      Past Medical History:    Benign paroxysmal positional vertigo  Calculus of kidney  Chronic abdominal pain  Chronic pancreatitis  Depressive  disorder  Diabetes  Dyspepsia   Headaches  Hypertension  Iron deficiency anemia  Post-pancreatectomy diabetes  Pyelonephritis  Regional enteritis  Sleep apnea  Spasm of sphincter of Oddi  Thyroid nodule  Ureteral calculus  Exocrine pancreatic insufficiency  Asplenia   Anxiety  Dysthymia  Hypothyroidism  E. coli bacteremia  Elevated LFTs    Past Surgical History:    Appendectomy  R bunionectomy  Tonsillectomy and adenoidectomy  Partial ileum resection  Right ovarian cyst removal  C section  Cholecystectomy  CBD stent  Colonoscopy  Gastric bypass  Cystoscopy and ureteroscopy  Tummy tuck  Ureter stent  ENT surgery  EGD  Extracorporeal shock wave lithotripsy  Small bowel obstruction  Hernia repair  Laparoscopic lysis adhesions  Pancreatectomy  Transplant islet cell  Splenectomy     Family History:    COPD  Kidney stones  Substance abuse  Depression  Asthma  CAD/MI: Paternal grandfather  Hyperlipidemia  Anxiety  Osteoporosis   Cerebrovascular disease  Asthma     Social History:  Negative for tobacco use.  Negative for alcohol use.  Marital Status:        Review of Systems  As noted above, otherwise negative.  She is trying to wean off Suboxone and is down to 1/8 of a strip, and plans to be off chronic opiates next month     Physical Exam   First Vitals:  BP: 108/61  Pulse: 119  Heart Rate: 61  Temp: 96.6  F (35.9  C)  Resp: 16  Weight: 63.5 kg (140 lb)  SpO2: 96 %      Physical Exam  Constitutional:  Appears well-developed and well-nourished. Alert. Conversant. Non toxic.  HENT:   Head: Atraumatic.   Nose: Nose normal.  Mouth/Throat: Oral mucosa is clear and moist. no trismus. Pharynx normal. Tonsils symmetric. No tonsillar enlargement, erythema, or exudate.  Eyes: Conjunctivae normal. EOM normal. Pupils equal, round, and reactive to light. No scleral icterus.   Neck: Normal range of motion. Neck supple. No tracheal deviation present.   Cardiovascular: Normal rate, regular rhythm. No gallop. No friction rub. No  murmur heard. Symmetric radial artery pulses   Pulmonary/Chest: Effort normal. No stridor. No respiratory distress. No wheezes. No rales. No rhonchi . No tenderness.   Abdominal: Soft. Bowel sounds normal. No distension, but firm.  She has multiple healed surgical scars.  No mass.  Diffuse tenderness Max tenderness in the left mid abdomen, lateral to her umbilicus and in her right upper quadrant. No rebound.  No CVA tenderness  Musculoskeletal:   RUE: Normal range of motion. No tenderness. No deformity  LUE: Normal range of motion. No tenderness. No deformity  RLE: Normal range of motion. No edema. No tenderness. No deformity  LLE: Normal range of motion. No edema. No tenderness. No deformity  Lymph: No cervical adenopathy.   Neurological: Alert and oriented to person, place, and time. Normal strength. CN II-VII intact. No sensory deficit. GCS eye subscore is 4. GCS verbal subscore is 5. GCS motor subscore is 6. Normal coordination   Skin: Skin is warm and dry. No rash noted. No pallor. Normal capillary refill.  Psychiatric:  Normal mood. Normal affect.     Emergency Department Course   Imaging:  Abdomen/Pelvis CT with IV contrast:  Impression:      IMPRESSION:  1. Moderate small bowel and right colonic fluid, nonspecific but could  be due to enteritis. Moderate stool in the colon. No bowel  obstruction.  2. No other acute cause of pain identified.  3. Extensive postoperative changes as described. Pneumobilia       Report per radiology.     Radiographic findings were communicated with the patient who voiced understanding of the findings.    Laboratory:  CBC: WBC: 8.7, HGB: 13.8, PLT: 392  CMP: o/w WNL (Creatinine: 0.91)     Lipase: 44 (L)    2254 Lactic acid whole blood: 1.2    UA: Ordered, but not obtained    2333 Blood Culture: In process  2344 Blood Culture: In process    Interventions:  2251 0.9% Sodium Chloride BOLUS 1000 mLs IV  2251 Zofran 4 mg IV  2252 Dilaudid 0.5 mg IV  Medications   0.9% sodium chloride  BOLUS (0 mLs Intravenous Stopped 10/8/18 6902)     Followed by   sodium chloride 0.9% infusion (not administered)   ondansetron (ZOFRAN) injection 4 mg (4 mg Intravenous Given 10/8/18 2251)   HYDROmorphone (PF) (DILAUDID) injection 0.5 mg (0.5 mg Intravenous Given 10/8/18 2252)       Emergency Department Course:  2243 Nursing notes and vitals reviewed. I performed an exam of the patient as documented above.     IV inserted. Medicine administered as documented above. Blood drawn. This was sent to the lab for further testing, results above.    The patient provided a urine sample here in the emergency department. This was sent for laboratory testing, findings above.     The patient was sent for an abdomen/pelvis CT while in the emergency department, findings above.     I rechecked the patient and discussed the results of her workup thus far.  She is feeling much better.    Findings and plan explained to the Patient and daughter. Patient discharged home with instructions regarding supportive care, medications, and reasons to return. The importance of close follow-up was reviewed. Th    I personally reviewed the laboratory results with the Patient and daughter and answered all related questions prior to discharge.     Impression & Plan          CMS Diagnoses:         Medical Decision Making:  Lynn Thompson is a 53 year old female presented to the Emergency Department with abdominal pain fairly diffuse in nature but worse on the left side in the left upper quadrant.  The clinical exam today is non-specific.  The laboratory testing does not reveal a cause for the patient's pain.  CT imaging is noted to be normal.  The exact etiology of the abdominal pain is not clear at this time.  The differential diagnosis of abdominal pain included Recurrent cholangitis, but there is no evidence for LFT abnormality, fever, or specific pain localizing to her right upper quadrant.  She does have pneumobilia on CT which I think is a  postoperative finding she has been through several prior ERCPs with sphincterotomy. She also has a history of multiple prior abdominal surgeries And prior bowel obstructions.  No evidence for obstruction on CT at this time.  Also no evidence for perforation, abscess.     No sign of appendicitis,  Ischemia, Diverticulitis, Pancreatitis.  Symptoms are not consistent with urinary tract infection and no evidence for kidney stone on CT.  Remaining on the differential could be ulcer, enteritis/Colitis, amongst many other etiologies.  She has been having some diarrhea for the past several weeks.  No bloody diarrhea, no recent antibiotics, but with complex past medical history bacterial enteritis or C. difficile would be on the differential.  Will order outpatient stool cultures, but would not initiate empiric antibiotic therapy without any definitive results.  No life threatening cause or need for emergent surgery or hospital admission is detected today.  The patient was advised that if symptoms do not completely resolve within another 12-24 hours re-evaluation with primary care or return to the ED is indicated. The pateient also understands that if they worsen, they should return to the ER right away. I discussed the uncertainty about the diagnosis and answered the patient's questions.     Critical Care time:  none    Diagnosis:    ICD-10-CM    1. Abdominal pain, generalized R10.84 Blood culture     Blood culture     Enteric Bacteria and Virus Panel by DILMA Stool     Clostridium difficile toxin B   2. Diarrhea, unspecified type R19.7 Clostridium difficile toxin B       Disposition:  discharged to home    Discharge Medications:  New Prescriptions    No medications on file     Scribe Disclosure:  Vinay SALDANA, am serving as a scribe on 10/8/2018 at 10:43 PM to personally document services performed by Dr. Nik MD based on my observations and the provider's statements to me.       Vinay Benitez  10/8/2018    [facility] EMERGENCY DEPARTMENT       Alen Zaragoza MD  10/10/18 6107

## 2018-10-09 NOTE — ED TRIAGE NOTES
Pain in upper left quadrant and radiation to the back.  Pt has been having nausea, vomiting and diarrhea for the past 2 weeks. Today the pain got unbearable.  Pt does have significant hx of abdominal surgery and islet cell transplant.

## 2018-10-09 NOTE — DISCHARGE INSTRUCTIONS
Discharge Instructions  Abdominal Pain    Abdominal pain can be caused by many things. Your evaluation today does not show the exact cause for your pain. Your doctor today has decided that it is unlikely your pain is due to a life threatening problem, or a problem requiring surgery or hospital admission. Sometimes those problems cannot be found right away, so it is very important that you follow up as directed.  Sometimes only the changes which occur over time allow the cause of your pain to be found.    Return to the Emergency Department for a recheck in 8-12 hours if your pain continues.  If your pain gets worse, changes in location, or feels different, return to the Emergency Department right away.    ADULTS:  Return to the Emergency Department right away if:      You get an oral temperature above 102oF or as directed by your doctor.    You have blood in your stools (bright red or black, tarry stools).    You keep throwing up or can t drink liquids.    You see blood when you throw up.    You can t have a bowel movement or you can t pass gas.    Your stomach gets bloated or bigger.    Your skin or the whites of your eyes look yellow.    You faint.    You have bloody, frequent or painful urination.    You have new symptoms or anything that worries you.    MORE INFORMATION:    Appendicitis:  A possible cause of abdominal pain in any person who still has their appendix is acute appendicitis. Appendicitis is often hard to diagnose.  Testing does not always rule out early appendicitis or other causes of abdominal pain. Close follow-up with your doctor and re-evaluations may be needed to figure out the reason for your abdominal pain.    Follow-up:  It is very important that you make an appointment with your clinic and go to the appointment.  If you do not follow-up with your primary doctor, it may result in missing an important development which could result in permanent injury or disability and/or lasting pain.  If  "there is any problem keeping your appointment, call your doctor or return to the Emergency Department.    Medications:  Take your medications as directed by your doctor today.  Before using over-the-counter medications, ask your doctor and make sure to take the medications as directed.  If you have any questions about medications, ask your doctor.    Diet:  Resume your normal diet as much as possible, but do not eat fried, fatty or spicy foods while you have pain.  Do not drink alcohol or have caffeine.  Do not smoke tobacco.    Probiotics: If you have been given an antibiotic, you may want to also take a probiotic pill or eat yogurt with live cultures. Probiotics have \"good bacteria\" to help your intestines stay healthy. Studies have shown that probiotics help prevent diarrhea and other intestine problems (including C. diff infection) when you take antibiotics. You can buy these without a prescription in the pharmacy section of the store.     If you were given a prescription for medicine here today, be sure to read all of the information (including the package insert) that comes with your prescription.  This will include important information about the medicine, its side effects, and any warnings that you need to know about.  The pharmacist who fills the prescription can provide more information and answer questions you may have about the medicine.  If you have questions or concerns that the pharmacist cannot address, please call or return to the Emergency Department.         Opioid Medication Information    Pain medications are among the most commonly prescribed medicines, so we are including this information for all our patients. If you did not receive pain medication or get a prescription for pain medicine, you can ignore it.     You may have been given a prescription for an opioid (narcotic) pain medicine and/or have received a pain medicine while here in the Emergency Department. These medicines can make you " drowsy or impaired. You must not drive, operate dangerous equipment, or engage in any other dangerous activities while taking these medications. If you drive while taking these medications, you could be arrested for DUI, or driving under the influence. Do not drink any alcohol while you are taking these medications.     Opioid pain medications can cause addiction. If you have a history of chemical dependency of any type, you are at a higher risk of becoming addicted to pain medications.  Only take these prescribed medications to treat your pain when all other options have been tried. Take it for as short a time and as few doses as possible. Store your pain pills in a secure place, as they are frequently stolen and provide a dangerous opportunity for children or visitors in your house to start abusing these powerful medications. We will not replace any lost or stolen medicine.  As soon as your pain is better, you should flush all your remaining medication.     Many prescription pain medications contain Tylenol  (acetaminophen), including Vicodin , Tylenol #3 , Norco , Lortab , and Percocet .  You should not take any extra pills of Tylenol  if you are using these prescription medications or you can get very sick.  Do not ever take more than 3000 mg of acetaminophen in any 24 hour period.    All opioids tend to cause constipation. Drink plenty of water and eat foods that have a lot of fiber, such as fruits, vegetables, prune juice, apple juice and high fiber cereal.  Take a laxative if you don t move your bowels at least every other day. Miralax , Milk of Magnesia, Colace , or Senna  can be used to keep you regular.      Remember that you can always come back to the Emergency Department if you are not able to see your regular doctor in the amount of time listed above, if you get any new symptoms, or if there is anything that worries you.

## 2018-10-16 ENCOUNTER — OFFICE VISIT (OUTPATIENT)
Dept: PODIATRY | Facility: CLINIC | Age: 53
End: 2018-10-16
Payer: COMMERCIAL

## 2018-10-16 ENCOUNTER — TELEPHONE (OUTPATIENT)
Dept: TRANSPLANT | Facility: CLINIC | Age: 53
End: 2018-10-16

## 2018-10-16 DIAGNOSIS — M19.90 ARTHRITIS: Primary | ICD-10-CM

## 2018-10-16 NOTE — MR AVS SNAPSHOT
After Visit Summary   10/16/2018    Lynn Thompson    MRN: [id number]           Patient Information     Date Of Birth          1965        Visit Information        Provider Department      10/16/2018 8:30 AM Manuel Chan DPM [facility]        Today's Diagnoses     Arthritis    -  1       Follow-ups after your visit        Your next 10 appointments already scheduled     Oct 17, 2018  3:45 PM CDT   XR INJECTION with RSCCXR2, [facility] IMAGING NURSE, [facility] ([facility])    [address] [facility]  [facility] [address] [phone]           How do I prepare for my exam? (Food and drink instructions) Stop drinking 1 hour before the exam.  How do I prepare for my exam? (Other instructions) You may take your medicines as usual, except for blood thinners (Coumadin, Plavix, Ticlid, Persantine, Aggrenox, Pletal, Effient, Brilliant). Talk to your doctor if you take these.  What should I wear: Wear comfortable clothes.  How long does the exam take: The entire exam takes about one hour. If you are having a wrist exam, you may have two shots up to two hours apart. You may leave the hospital between the shots.If your doctor has ordered a CT or MRI scan of this joint, this will take another 30 to 45 minutes.  What should I bring: Please bring a list of your current medicines to your exam. Include vitamins, minerals and over-the-counter medicines.  Do I need a :  No  is needed.  What do I need to tell my doctor: Tell your doctor if: * You have ever had an allergic reaction to X-ray dye (contrast fluid). * If there s any chance you are pregnant.  What should I do after the exam: Try to rest for the next 12 hours or so. You can go back to normal activities the day after your exam.  What is this test: An arthrogram is an X-ray exam of a joint. We will take a set of X-ray pictures. We will then inject dye  (contrast fluid) into the area around the joint. After that, we will take another set of X-ray pictures. The dye helps your doctor see the joint better on the X-rays. With a joint injection, we will also inject a steroid into your joint after we inject the dye.  Who should I call with questions: If you have any questions, please call the Imaging Department where you will have your exam. Directions, parking instructions, and other information is available on our website, [email or web address].            Jan 17, 2019 10:00 AM CST   (Arrive by 9:45 AM)   Return Auto [place]let with Adriana Robles MD   [facility] Solid Organ Transplant ([facility])    [address]  [address]  [place] [address] [phone]              Who to contact     If you have questions or need follow up information about today's clinic visit or your schedule please contact [facility] directly at [phone].  Normal or non-critical lab and imaging results will be communicated to you by [employer], letter or phone within 4 business days after the clinic has received the results. If you do not hear from us within 7 days, please contact the clinic through [employer] or phone. If you have a critical or abnormal lab result, we will notify you by phone as soon as possible.  Submit refill requests through [employer] That Talk or call your pharmacy and they will forward the refill request to us. Please allow 3 business days for your refill to be completed.          Additional Information About Your Visit        [employer] Information     Labels That Talk gives you secure access to your electronic health record. If you see a primary care provider, you can also send messages to your care team and make appointments. If you have questions, please call your primary care clinic.  If you do not have a primary care provider, please call [phone] and they will assist you.        Care EveryWhere ID     This is your Care EveryWhere ID.  This could be used by other organizations to access your [place] medical records  [id number]        Your Vitals Were     Last Period                   12/19/2013            Blood Pressure from Last 3 Encounters:   10/09/18 125/73   09/20/18 137/69   08/31/18 112/72    Weight from Last 3 Encounters:   10/08/18 140 lb (63.5 kg)   09/20/18 146 lb (66.2 kg)   08/31/18 146 lb 1.6 oz (66.3 kg)              Today, you had the following     No orders found for display         Today's Medication Changes          These changes are accurate as of 10/16/18  8:45 AM.  If you have any questions, ask your nurse or doctor.               These medicines have changed or have updated prescriptions.        Dose/Directions    busPIRone 15 MG tablet   Commonly known as:  BUSPAR   This may have changed:    - how much to take  - when to take this  - additional instructions   Used for:  Anxiety        Increased dose. 30 mg two times per day.   Quantity:  120 tablet   Refills:  3                Primary Care Provider Office Phone # Fax #    Maryana Sivakumar Brooks [phone] [address] [facility] [address]        Equal Access to Services     CA SOLANO AH: Hadii kapil ocasio hadaliceo Soyandel, waaxda luqadaha, qaybta kaalmada adeegyada, albin allen. So [facility] [phone].    ATENCIÓN: Si habla español, tiene a rivera disposición servicios gratuitos de asistencia lingüística. Llame al [phone].    We comply with applicable federal civil rights laws and [place] laws. We do not discriminate on the basis of race, color, national origin, age, disability, sex, sexual orientation, or gender identity.            Thank you!     Thank you for choosing [facility]  for your care. Our goal is always to provide you with excellent care. Hearing back from our patients is one way we can continue to improve our services. Please take a few minutes to complete the written survey that you may  receive in the mail after your visit with us. Thank you!             Your Updated Medication List - Protect others around you: Learn how to safely use, store and throw away your medicines at www.disposemymeds.org.          This list is accurate as of 10/16/18  8:45 AM.  Always use your most recent med list.                   Brand Name Dispense Instructions for use Diagnosis    ACETAMINOPHEN PO      Take 325 mg by mouth every 6 hours as needed for pain        amylase-lipase-protease 48644 units Tabs tablet    VIOKACE    600 tablet    Take 4-5 caps with meals and 1-2 with snacks    Pancreatic insufficiency       blood glucose lancing device     1 each    Use to test blood sugars 6 times daily or as directed.    Absence of pancreas, acquired       * blood glucose monitoring test strip    BILL CONTOUR NEXT    2 Box    Check BS 4-6 times a day    Absence of pancreas, acquired       * blood glucose monitoring test strip    no brand specified      Left foot pain       BOOST HIGH PROTEIN Liqd      After above baseline labs are drawn, give: 6 mL/kg to maximum of 360 mL; the beverage is to be consumed within 5 minutes.    Post-pancreatectomy diabetes (H)       buprenorphine HCl-naloxone HCl 2-0.5 MG per film    SUBOXONE     Place 0.5 Film under the tongue every morning Patient states she takes an 1/8 of a strip daily.        busPIRone 15 MG tablet    BUSPAR    120 tablet    Increased dose. 30 mg two times per day.    Anxiety       cefuroxime 500 MG tablet    CEFTIN    20 tablet    Take 1 tablet (500 mg) by mouth 2 times daily    Acute sinusitis with symptoms > 10 days       CLONIDINE HCL PO      Take 0.1 mg by mouth 2 times daily as needed        continuous blood glucose monitoring sensor     3 each    For use with Freestyle Bre Flash  for continuous monitioring of blood glucose levels. Replace sensor every 10 days.    Post-pancreatectomy diabetes (H)       DOCUSATE SODIUM PO      Take 100 mg by mouth daily         DULoxetine 30 MG EC capsule    CYMBALTA    180 capsule    Take 1 capsule (30 mg) by mouth 2 times daily Fill at patient request.    Anxiety       escitalopram 10 MG tablet    LEXAPRO      Left foot pain       ESTRACE VAGINAL 0.1 MG/GM cream   Generic drug:  estradiol     42.5 g    PLACE 2 GRAMS VAGINALLY TWICE A WEEK    Vaginal atrophy       Estradiol-Norethindrone Acet 0.5-0.1 MG Tabs     84 tablet    TAKE ONE TABLET BY MOUTH DAILY    Decreased libido       FREESTYLE DIANE READER Genny     1 Device    1 each 3 times daily (before meals) Use with diane sensor to check BG before meals and as needed    Post-pancreatectomy diabetes (H)       gabapentin 300 MG capsule    NEURONTIN          glucose 40 % (400 mg/mL) Gel gel     1 Tube    Take 15-30 g by mouth every 15 minutes as needed.    Chronic pancreatitis (H)       insulin aspart 100 UNITS/ML injection    NovoLOG penFILL    3 mL    Inject 0.5-2 units 4 times daily    Diabetes mellitus type 1 (H)       insulin glargine 100 UNIT/ML injection    LANTUS SOLOSTAR    15 mL    Inject 5 Units Subcutaneous every evening    Post-pancreatectomy diabetes (H)       insulin pen needle 32G X 4 MM    BD MAHESH U/F    100 each    Use up to 3 times daily as needed for insulin dosing    Acquired absence of pancreas       levothyroxine 88 MCG tablet    SYNTHROID/LEVOTHROID    90 tablet    Take 1 tablet (88 mcg) by mouth daily    Acquired hypothyroidism       loratadine 10 MG tablet    CLARITIN     Take 10 mg by mouth daily as needed        LORazepam 0.5 MG tablet    ATIVAN    30 tablet    Take 1 tablet (0.5 mg) by mouth every 8 hours as needed for anxiety    Anxiety       modafinil 200 MG tablet    PROVIGIL     Take 2 tablets by mouth Once a Day        MOVANTIK 25 MG Tabs tablet   Generic drug:  naloxegol      Take 25 mg by mouth every morning (before breakfast)        naloxone nasal spray    NARCAN     Spray 4 mg in nostril as needed for opioid reversal        ELKE Royal (GREEN)    Generic drug:  Injection Device for insulin      1 Device Inject 1 unit (which is marked as a 1/2 unit on the pen itself)  as needed when eating carb heavy meals.        omeprazole 40 MG capsule    priLOSEC    180 capsule    Take 1 capsule (40 mg) by mouth 2 times daily Take 30-60 minutes before a meal.    Gastroesophageal reflux disease without esophagitis, Nausea, Gastric bypass status for obesity       ondansetron 4 MG ODT tab    ZOFRAN ODT    20 tablet    Take 1-2 tablets (4-8 mg) by mouth 3 times daily (before meals)    Nausea       polyethylene glycol Packet    MIRALAX/GLYCOLAX    7 packet    Take 17 g by mouth daily    Constipation       ranitidine 150 MG tablet    ZANTAC    180 tablet    Take 1 tablet (150 mg) by mouth 2 times daily    Nausea       sennosides 8.6 MG tablet    SENOKOT    120 tablet    Take 2 tablets by mouth 2 times daily    Other constipation       Sharps Container Misc     1 each    For insulin needles    Absence of pancreas, acquired       traZODone 50 MG tablet    DESYREL    90 tablet    Take 1 tablet (50 mg) by mouth At Bedtime    Persistent insomnia       VISTARIL PO      Take 25 mg by mouth every 6 hours as needed for itching        * Notice:  This list has 2 medication(s) that are the same as other medications prescribed for you. Read the directions carefully, and ask your doctor or other care provider to review them with you.

## 2018-10-16 NOTE — TELEPHONE ENCOUNTER
Returned a call to Lynn.She was in the ER recently and called to follow up/. She still gets distended after she eats , gets full very quickly.However after she had a large bowel movement she felt hungry !  Current bowel meds  2 stool softners daily  Miralax  Movatik( ran out )last Friday,   I suggested she get a refill of the Movantik. Currently after last nights bowel movement she is feeling better.  I noted from her chart that she is having a steroid injection her foot . I advised her that this will affect her blood sugars and she needs to get in contact with Dr Robles for guidance ASAP.

## 2018-10-16 NOTE — PROGRESS NOTES
Pt showed up today thinking we would do her steroid injection under xray in clinic today. I told her it was supposed to be at the hospital with an xray tech. I called [facility] radiology and got her scheduled tomorrow at 3:45PM. Pt happy with plan.

## 2018-10-16 NOTE — LETTER
10/16/2018         RE: Lynn Thompson  [address] [address]        Dear Colleague,    Thank you for referring your patient, Lynn Thompson, to the [facility]. Please see a copy of my visit note below.    Pt showed up today thinking we would do her steroid injection under xray in clinic today. I told her it was supposed to be at the hospital with an xray tech. I called [facility] radiology and got her scheduled tomorrow at 3:45PM. Pt happy with plan.    Again, thank you for allowing me to participate in the care of your patient.        Sincerely,        Manuel Chan, PETAR, PETAR

## 2018-10-17 ENCOUNTER — HOSPITAL ENCOUNTER (OUTPATIENT)
Dept: GENERAL RADIOLOGY | Facility: CLINIC | Age: 53
Discharge: HOME | End: 2018-10-17
Attending: PODIATRIST | Admitting: PODIATRIST
Payer: COMMERCIAL

## 2018-10-17 VITALS — HEART RATE: 68 BPM | SYSTOLIC BLOOD PRESSURE: 119 MMHG | DIASTOLIC BLOOD PRESSURE: 53 MMHG

## 2018-10-17 DIAGNOSIS — M19.072 ARTHRITIS OF LEFT FOOT: ICD-10-CM

## 2018-10-17 PROCEDURE — 25500064 ZZH RX 255 OP 636: Performed by: PHYSICIAN ASSISTANT

## 2018-10-17 PROCEDURE — 77002 NEEDLE LOCALIZATION BY XRAY: CPT

## 2018-10-17 PROCEDURE — 25000128 H RX IP 250 OP 636

## 2018-10-17 PROCEDURE — 25000125 ZZHC RX 250

## 2018-10-17 RX ORDER — IOPAMIDOL 408 MG/ML
10 INJECTION, SOLUTION INTRATHECAL ONCE
Status: COMPLETED | OUTPATIENT
Start: 2018-10-17 | End: 2018-10-17

## 2018-10-17 RX ORDER — TRIAMCINOLONE ACETONIDE 40 MG/ML
40 INJECTION, SUSPENSION INTRA-ARTICULAR; INTRAMUSCULAR ONCE
Status: COMPLETED | OUTPATIENT
Start: 2018-10-17 | End: 2018-10-17

## 2018-10-17 RX ORDER — BUPIVACAINE HYDROCHLORIDE 5 MG/ML
4 INJECTION, SOLUTION EPIDURAL; INTRACAUDAL ONCE
Status: COMPLETED | OUTPATIENT
Start: 2018-10-17 | End: 2018-10-17

## 2018-10-17 RX ORDER — LIDOCAINE HYDROCHLORIDE 10 MG/ML
INJECTION, SOLUTION EPIDURAL; INFILTRATION; INTRACAUDAL; PERINEURAL
Status: COMPLETED
Start: 2018-10-17 | End: 2018-10-17

## 2018-10-17 RX ORDER — TRIAMCINOLONE ACETONIDE 40 MG/ML
INJECTION, SUSPENSION INTRA-ARTICULAR; INTRAMUSCULAR
Status: COMPLETED
Start: 2018-10-17 | End: 2018-10-17

## 2018-10-17 RX ORDER — LIDOCAINE HYDROCHLORIDE 10 MG/ML
5 INJECTION, SOLUTION EPIDURAL; INFILTRATION; INTRACAUDAL; PERINEURAL ONCE
Status: COMPLETED | OUTPATIENT
Start: 2018-10-17 | End: 2018-10-17

## 2018-10-17 RX ORDER — BUPIVACAINE HYDROCHLORIDE 5 MG/ML
INJECTION, SOLUTION EPIDURAL; INTRACAUDAL
Status: COMPLETED
Start: 2018-10-17 | End: 2018-10-17

## 2018-10-17 RX ADMIN — IOPAMIDOL 1 ML: 408 INJECTION, SOLUTION INTRATHECAL at 16:43

## 2018-10-17 RX ADMIN — LIDOCAINE HYDROCHLORIDE 5 ML: 10 INJECTION, SOLUTION EPIDURAL; INFILTRATION; INTRACAUDAL; PERINEURAL at 16:40

## 2018-10-17 RX ADMIN — BUPIVACAINE HYDROCHLORIDE 1 ML: 5 INJECTION, SOLUTION EPIDURAL; INTRACAUDAL; PERINEURAL at 16:42

## 2018-10-17 RX ADMIN — TRIAMCINOLONE ACETONIDE 40 MG: 40 INJECTION, SUSPENSION INTRA-ARTICULAR; INTRAMUSCULAR at 16:41

## 2018-10-17 RX ADMIN — BUPIVACAINE HYDROCHLORIDE 1 ML: 5 INJECTION, SOLUTION EPIDURAL; INTRACAUDAL at 16:42

## 2018-10-24 DIAGNOSIS — R68.82 DECREASED LIBIDO: ICD-10-CM

## 2018-10-24 RX ORDER — ESTRADIOL AND NORETHINDRONE ACETATE .5; .1 MG/1; MG/1
TABLET ORAL
Qty: 84 TABLET | Refills: 0 | Status: SHIPPED | OUTPATIENT
Start: 2018-10-24 | End: 2018-12-30

## 2018-10-31 ENCOUNTER — ALLIED HEALTH/NURSE VISIT (OUTPATIENT)
Dept: NURSING | Facility: CLINIC | Age: 53
End: 2018-10-31
Payer: COMMERCIAL

## 2018-10-31 DIAGNOSIS — Z23 NEED FOR PROPHYLACTIC VACCINATION AND INOCULATION AGAINST INFLUENZA: Primary | ICD-10-CM

## 2018-10-31 PROCEDURE — 90471 IMMUNIZATION ADMIN: CPT

## 2018-10-31 PROCEDURE — 90662 IIV NO PRSV INCREASED AG IM: CPT

## 2018-10-31 NOTE — PROGRESS NOTES

## 2018-10-31 NOTE — MR AVS SNAPSHOT
After Visit Summary   10/31/2018    Lynn Thompson    MRN: [id number]           Patient Information     Date Of Birth          1965        Visit Information        Provider Department      10/31/2018 11:00 AM SOSA RENEE/LPN [facility]        Today's Diagnoses     Need for prophylactic vaccination and inoculation against influenza    -  1       Follow-ups after your visit        Your next 10 appointments already scheduled     Jan 17, 2019 10:00 AM CST   (Arrive by 9:45 AM)   Return Auto Islet with Adriana Robles MD   [facility] Solid Organ Transplant ([facility])    [address] [place] [address]  [address]  [facility] [address] [phone]              Who to contact     If you have questions or need follow up information about today's clinic visit or your schedule please contact [facility] directly at [phone].  Normal or non-critical lab and imaging results will be communicated to you by MyChart, letter or phone within 4 business days after the clinic has received the results. If you do not hear from us within 7 days, please contact the clinic through [employer] or phone. If you have a critical or abnormal lab result, we will notify you by phone as soon as possible.  Submit refill requests through [employer]e or call your pharmacy and they will forward the refill request to us. Please allow 3 business days for your refill to be completed.          Additional Information About Your Visit        MyChart Information     [employer] gives you secure access to your electronic health record. If you see a primary care provider, you can also send messages to your care team and make appointments. If you have questions, please call your primary care clinic.  If you do not have a primary care provider, please call [phone] and they will assist you.        Care EveryWhere ID     This is your Care EveryWhere ID. This could be used by other organizations to  access your [place] medical records  [id number]        Your Vitals Were     Last Period                   12/19/2013            Blood Pressure from Last 3 Encounters:   10/17/18 119/53   10/09/18 125/73   09/20/18 137/69    Weight from Last 3 Encounters:   10/08/18 140 lb (63.5 kg)   09/20/18 146 lb (66.2 kg)   08/31/18 146 lb 1.6 oz (66.3 kg)              We Performed the Following     FLU VACCINE, INCREASED ANTIGEN, PRESV FREE     Vaccine Administration, Initial [21994]          Today's Medication Changes          These changes are accurate as of 10/31/18 12:02 PM.  If you have any questions, ask your nurse or doctor.               These medicines have changed or have updated prescriptions.        Dose/Directions    busPIRone 15 MG tablet   Commonly known as:  BUSPAR   This may have changed:    - how much to take  - when to take this  - additional instructions   Used for:  Anxiety        Increased dose. 30 mg two times per day.   Quantity:  120 tablet   Refills:  3                Primary Care Provider Office Phone # Fax #    Maryana Sivakumar Brooks [phone] [address] [facility] [place] [address]        Equal Access to Services     CHAD SOLANO AH: Hadii kapil ocasio hadasho Soheribertoali, waaxda luqadaha, qaybta kaalmada adeegyada, albin allen. So [facility] [phone].    ATENCIÓN: Si habla español, tiene a rivera disposición servicios gratuitos de asistencia lingüística. Wename al [phone].    We comply with applicable federal civil rights laws and [place] laws. We do not discriminate on the basis of race, color, national origin, age, disability, sex, sexual orientation, or gender identity.            Thank you!     Thank you for choosing [facility]  for your care. Our goal is always to provide you with excellent care. Hearing back from our patients is one way we can continue to improve our services. Please take a few minutes to complete the written survey  that you may receive in the mail after your visit with us. Thank you!             Your Updated Medication List - Protect others around you: Learn how to safely use, store and throw away your medicines at www.disposemymeds.org.          This list is accurate as of 10/31/18 12:02 PM.  Always use your most recent med list.                   Brand Name Dispense Instructions for use Diagnosis    ACETAMINOPHEN PO      Take 325 mg by mouth every 6 hours as needed for pain        amylase-lipase-protease 86830 units Tabs tablet    VIOKACE    600 tablet    Take 4-5 caps with meals and 1-2 with snacks    Pancreatic insufficiency       blood glucose lancing device     1 each    Use to test blood sugars 6 times daily or as directed.    Absence of pancreas, acquired       * blood glucose monitoring test strip    BILL CONTOUR NEXT    2 Box    Check BS 4-6 times a day    Absence of pancreas, acquired       * blood glucose monitoring test strip    no brand specified      Left foot pain       BOOST HIGH PROTEIN Liqd      After above baseline labs are drawn, give: 6 mL/kg to maximum of 360 mL; the beverage is to be consumed within 5 minutes.    Post-pancreatectomy diabetes (H)       buprenorphine HCl-naloxone HCl 2-0.5 MG per film    SUBOXONE     Place 0.5 Film under the tongue every morning Patient states she takes an 1/8 of a strip daily.        busPIRone 15 MG tablet    BUSPAR    120 tablet    Increased dose. 30 mg two times per day.    Anxiety       cefuroxime 500 MG tablet    CEFTIN    20 tablet    Take 1 tablet (500 mg) by mouth 2 times daily    Acute sinusitis with symptoms > 10 days       CLONIDINE HCL PO      Take 0.1 mg by mouth 2 times daily as needed        continuous blood glucose monitoring sensor     3 each    For use with Freestyle Bre Flash  for continuous monitioring of blood glucose levels. Replace sensor every 10 days.    Post-pancreatectomy diabetes (H)       DOCUSATE SODIUM PO      Take 100 mg by mouth  daily        DULoxetine 30 MG EC capsule    CYMBALTA    180 capsule    Take 1 capsule (30 mg) by mouth 2 times daily Fill at patient request.    Anxiety       escitalopram 10 MG tablet    LEXAPRO      Left foot pain       ESTRACE VAGINAL 0.1 MG/GM cream   Generic drug:  estradiol     42.5 g    PLACE 2 GRAMS VAGINALLY TWICE A WEEK    Vaginal atrophy       Estradiol-Norethindrone Acet 0.5-0.1 MG Tabs     84 tablet    TAKE ONE TABLET BY MOUTH DAILY    Decreased libido       FREESTYLE ADAM READER Genny     1 Device    1 each 3 times daily (before meals) Use with adam sensor to check BG before meals and as needed    Post-pancreatectomy diabetes (H)       gabapentin 300 MG capsule    NEURONTIN          glucose 40 % (400 mg/mL) Gel gel     1 Tube    Take 15-30 g by mouth every 15 minutes as needed.    Chronic pancreatitis (H)       insulin aspart 100 UNITS/ML injection    NovoLOG penFILL    3 mL    Inject 0.5-2 units 4 times daily    Diabetes mellitus type 1 (H)       insulin glargine 100 UNIT/ML injection    LANTUS SOLOSTAR    15 mL    Inject 5 Units Subcutaneous every evening    Post-pancreatectomy diabetes (H)       insulin pen needle 32G X 4 MM    BD MAHESH U/F    100 each    Use up to 3 times daily as needed for insulin dosing    Acquired absence of pancreas       levothyroxine 88 MCG tablet    SYNTHROID/LEVOTHROID    90 tablet    Take 1 tablet (88 mcg) by mouth daily    Acquired hypothyroidism       loratadine 10 MG tablet    CLARITIN     Take 10 mg by mouth daily as needed        LORazepam 0.5 MG tablet    ATIVAN    30 tablet    Take 1 tablet (0.5 mg) by mouth every 8 hours as needed for anxiety    Anxiety       modafinil 200 MG tablet    PROVIGIL     Take 2 tablets by mouth Once a Day        MOVANTIK 25 MG Tabs tablet   Generic drug:  naloxegol      Take 25 mg by mouth every morning (before breakfast)        naloxone nasal spray    NARCAN     Spray 4 mg in nostril as needed for opioid reversal        NOVOPEN JR  (GREEN) Genny   Generic drug:  Injection Device for insulin      1 Device Inject 1 unit (which is marked as a 1/2 unit on the pen itself)  as needed when eating carb heavy meals.        omeprazole 40 MG capsule    priLOSEC    180 capsule    Take 1 capsule (40 mg) by mouth 2 times daily Take 30-60 minutes before a meal.    Gastroesophageal reflux disease without esophagitis, Nausea, Gastric bypass status for obesity       ondansetron 4 MG ODT tab    ZOFRAN ODT    20 tablet    Take 1-2 tablets (4-8 mg) by mouth 3 times daily (before meals)    Nausea       polyethylene glycol Packet    MIRALAX/GLYCOLAX    7 packet    Take 17 g by mouth daily    Constipation       ranitidine 150 MG tablet    ZANTAC    180 tablet    Take 1 tablet (150 mg) by mouth 2 times daily    Nausea       sennosides 8.6 MG tablet    SENOKOT    120 tablet    Take 2 tablets by mouth 2 times daily    Other constipation       Sharps Container Misc     1 each    For insulin needles    Absence of pancreas, acquired       traZODone 50 MG tablet    DESYREL    90 tablet    Take 1 tablet (50 mg) by mouth At Bedtime    Persistent insomnia       VISTARIL PO      Take 25 mg by mouth every 6 hours as needed for itching        * Notice:  This list has 2 medication(s) that are the same as other medications prescribed for you. Read the directions carefully, and ask your doctor or other care provider to review them with you.

## 2018-10-31 NOTE — NURSING NOTE
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 recommendation was approved by  in 2015(copy&paste note)  Bharat Coates MD        4:57 PM   Note      Have her make  A \"Nurse Only\" Appointment in the Clinic for this Special request. Thanks!         October 22, 2015            9:19 AM   Yady Hansen, RN routed this conversation to Bharat Coates MD Ropella, Melissa, RN        9:17 AM   Note      Pt calls, she would like to get the high dose flu shot, but the pharmacy won't give this to her without a script from her doctor since she is not > 65 years old. 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 Pt asking Dr. Coates to sent script for high dose flu shot to [facility].          .Vero DELACRUZ MA        "

## 2018-11-20 ENCOUNTER — TELEPHONE (OUTPATIENT)
Dept: FAMILY MEDICINE | Facility: CLINIC | Age: 53
End: 2018-11-20

## 2018-11-20 DIAGNOSIS — R78.81 E COLI BACTEREMIA: ICD-10-CM

## 2018-11-20 DIAGNOSIS — R79.89 ELEVATED LFTS: ICD-10-CM

## 2018-11-20 DIAGNOSIS — K86.81 EXOCRINE PANCREATIC INSUFFICIENCY: Primary | ICD-10-CM

## 2018-11-20 DIAGNOSIS — T36.95XA ANTIBIOTIC-ASSOCIATED DIARRHEA: ICD-10-CM

## 2018-11-20 DIAGNOSIS — K52.1 ANTIBIOTIC-ASSOCIATED DIARRHEA: ICD-10-CM

## 2018-11-20 DIAGNOSIS — D50.8 IRON DEFICIENCY ANEMIA SECONDARY TO INADEQUATE DIETARY IRON INTAKE: ICD-10-CM

## 2018-11-20 DIAGNOSIS — Z98.890 S/P EXPLORATORY LAPAROTOMY: ICD-10-CM

## 2018-11-20 DIAGNOSIS — B96.20 E COLI BACTEREMIA: ICD-10-CM

## 2018-11-20 DIAGNOSIS — Z98.84 GASTRIC BYPASS STATUS FOR OBESITY: ICD-10-CM

## 2018-11-20 NOTE — TELEPHONE ENCOUNTER
Pt is having issues with bloating and abdominal pain.   She is also having issues with eating and feeling nauseous again.     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  The GI provider she use to see has left practice and she tired to make appt with [facility] but they require referral.     Please sign off on referral if appropriate and call pt once complete.       Sarika Osullivan RN

## 2018-12-03 ENCOUNTER — TELEPHONE (OUTPATIENT)
Dept: GASTROENTEROLOGY | Facility: CLINIC | Age: 53
End: 2018-12-03

## 2018-12-03 NOTE — TELEPHONE ENCOUNTER
[facility] Intake form:    Referring/Requesting provider and Health care System: Dr Maryana Penny at [facility]  If self referral (not second opinion) then must get referral from PCP or MD.  - PATIENT IS RESPONSIBLE FOR GETTING ALL RECORDS TO OUR OFFICE FOR SELF REFERRALS   - If self referral for second opinion then we NEED name of GI provider pt seen for initial consult.   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 She saw him many years ago when she was first diagnosed with chronic pancreatitis. He did an ERCP, and other testing for her. All her records [facility] at [facility]    Is diagnosis on list of approved diagnosis: Yes    Has patient been evaluated by another Gastroenterologist? No, just Dr Presley    Imaging completed:     CT scan     Yes on 10/8/2018   MRI         Yes about a year ago    Procedures:     Upper Endoscopy/EGD   Yes in 2018 at the beginning of the year     Endoscopic Ultrasound/EUS No    ERCP      Yes, many years ago with Dr Presley    Colonoscopy    No      Are images able/being pushed to our system? All imaging is through either us or through [facility]    - If images are not able to be pushed, which clinic are images being mailed to? [facility]   [facility]   Attn: Flakita (Fatoumata hummel) Barbara (El Herrerata)   [address]   [address]nd Floor, Mail [id number]   [place] [address]       [facility]  [facility]   Surgery Clinic: [facility]  Attn: Lilian/Trell (Fernandez Owens and Ayesha)  4th Floor, Mail code [id number]  [address] [place] [address]    Is patient aware of request for clinc consultation and ok to be contacted to schedule? Yes

## 2018-12-13 ENCOUNTER — CARE COORDINATION (OUTPATIENT)
Dept: GASTROENTEROLOGY | Facility: CLINIC | Age: 53
End: 2018-12-13

## 2018-12-13 DIAGNOSIS — K86.1 CHRONIC PANCREATITIS (H): Primary | ICD-10-CM

## 2018-12-13 NOTE — TELEPHONE ENCOUNTER
[facility]    Phone Message    May a detailed message be left on voicemail: yes    Reason for Call: Patient called upset that no one had called her yet regarding her referral, States was told she would receive a call by today.     Writer noticed after patient was disconnected from the line that the original message was not routed to the clinic. Sending high priority message to make sure process gets started for the patient as soon as possible.     Action Taken: Message routed to:  [facility] ([facility]

## 2018-12-13 NOTE — PROGRESS NOTES
/Advanced [facility]oscopy -     Referring provider: Dr Maryana Penny at [facility]    Referred to: [facility] Provider Group     Provider Requested: Dr. 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 She saw him many years ago when she was first diagnosed with chronic pancreatitis. He did an ERCP, and other testing for her. All her records are here at the       MD review date: Routed to Dr. Presley on 12/13/2018  MD Decision for clinic consultation/Orders: See me and alec in clinic            Referral updates/Patient contacted:   12/13/2018- Called patient to advise her that we received the referral and will be in touch once Dr. Presley reviews. Reports that if she eats, she throws up. Or gets very distended. She states that she would like to come see him in clinic but worried about how far he is booked out.     1/9/19- Called and advised patient of Dr. Presley's recommendations. Advised that  will reach out regarding getting it scheduled. No further questions/concerns at this time. Gave clinic number.

## 2018-12-14 ENCOUNTER — PATIENT OUTREACH (OUTPATIENT)
Dept: GASTROENTEROLOGY | Facility: CLINIC | Age: 53
End: 2018-12-14

## 2018-12-14 NOTE — TELEPHONE ENCOUNTER
Original referral was not sent to [facility] team for review.     Will send to Dr. Presley for review asap and contact patient.     Lilian NINO RN Care Coordinator  Dr. Presley, Dr. Owens & Dr. Hodge   [facility]  [phone]

## 2018-12-30 DIAGNOSIS — E03.9 ACQUIRED HYPOTHYROIDISM: ICD-10-CM

## 2018-12-30 DIAGNOSIS — N95.2 VAGINAL ATROPHY: ICD-10-CM

## 2018-12-30 DIAGNOSIS — K86.81 EXOCRINE PANCREATIC INSUFFICIENCY: Primary | ICD-10-CM

## 2018-12-30 DIAGNOSIS — R68.82 DECREASED LIBIDO: ICD-10-CM

## 2018-12-30 NOTE — TELEPHONE ENCOUNTER
"Requested Prescriptions   Pending Prescriptions Disp Refills     levothyroxine (SYNTHROID/LEVOTHROID) 88 MCG tablet  Last Written Prescription Date:  04/03/2018  Last Fill Quantity: 90,  # refills: 1   Last office visit: 8/23/2018 with prescribing provider:  08/23/2018   Future Office Visit:     90 tablet 0     Sig: Take 1 tablet (88 mcg) by mouth daily    Thyroid Protocol Passed - 12/30/2018  3:13 PM       Passed - Patient is 12 years or older       Passed - Recent (12 mo) or future (30 days) visit within the authorizing provider's specialty    Patient had office visit in the last 12 months or has a visit in the next 30 days with authorizing provider or within the authorizing provider's specialty.  See \"Patient Info\" tab in inbasket, or \"Choose Columns\" in Meds & Orders section of the refill encounter.             Passed - Normal TSH on file in past 12 months    Recent Labs   Lab Test 03/26/18  0816   TSH 3.27             Passed - No active pregnancy on record    If patient is pregnant or has had a positive pregnancy test, please check TSH.         Passed - No positive pregnancy test in past 12 months    If patient is pregnant or has had a positive pregnancy test, please check TSH.          amylase-lipase-protease (VIOKACE) 63341 units TABS tablet  Last Written Prescription Date:  04/12/2010  Last Fill Quantity: 180,  # refills: na   Last office visit: 8/23/2018 with prescribing provider:  08/23/2018   Future Office Visit:     180 tablet 0     Sig: Take by mouth 3 times daily (with meals)    There is no refill protocol information for this order            "

## 2018-12-31 RX ORDER — ESTRADIOL 0.1 MG/G
CREAM VAGINAL
Qty: 42.5 G | Refills: 0 | Status: SHIPPED | OUTPATIENT
Start: 2018-12-31 | End: 2019-01-22

## 2018-12-31 RX ORDER — ESTRADIOL AND NORETHINDRONE ACETATE .5; .1 MG/1; MG/1
TABLET ORAL
Qty: 84 TABLET | Refills: 0 | Status: SHIPPED | OUTPATIENT
Start: 2018-12-31 | End: 2019-03-25

## 2018-12-31 RX ORDER — LEVOTHYROXINE SODIUM 88 UG/1
88 TABLET ORAL DAILY
Qty: 90 TABLET | Refills: 0 | Status: SHIPPED | OUTPATIENT
Start: 2018-12-31 | End: 2019-01-24

## 2018-12-31 NOTE — TELEPHONE ENCOUNTER
Prescription approved per [facility] Refill Protocol.  For synthroid for 90 day fill  Will be due for f/u     Sarika Osullivan RN

## 2019-01-11 ENCOUNTER — CARE COORDINATION (OUTPATIENT)
Dept: GASTROENTEROLOGY | Facility: CLINIC | Age: 54
End: 2019-01-11

## 2019-01-11 NOTE — PROGRESS NOTES
"Patient left voicemail advising that she has been waiting to hear back about scheduling an appointment with Dr. Presley and Shadia Fisher. She started crying stated \"I have been in pain and would really appreciate if there was something you could do to speed things up\".     I will send this to .     ALBERT Coley Dr., Dr. Owens, & Dr. Hodge  [facility]  [phone]    "

## 2019-01-16 DIAGNOSIS — E89.1 POST-PANCREATECTOMY DIABETES (H): Primary | ICD-10-CM

## 2019-01-16 DIAGNOSIS — E13.9 POST-PANCREATECTOMY DIABETES (H): Primary | ICD-10-CM

## 2019-01-16 DIAGNOSIS — Z90.410 POST-PANCREATECTOMY DIABETES (H): Primary | ICD-10-CM

## 2019-01-17 ENCOUNTER — OFFICE VISIT (OUTPATIENT)
Dept: TRANSPLANT | Facility: CLINIC | Age: 54
End: 2019-01-17
Attending: PEDIATRICS
Payer: COMMERCIAL

## 2019-01-17 VITALS
TEMPERATURE: 98.7 F | SYSTOLIC BLOOD PRESSURE: 157 MMHG | DIASTOLIC BLOOD PRESSURE: 93 MMHG | BODY MASS INDEX: 25.61 KG/M2 | WEIGHT: 149.2 LBS | HEART RATE: 66 BPM

## 2019-01-17 DIAGNOSIS — K90.9 INTESTINAL MALABSORPTION, UNSPECIFIED TYPE: ICD-10-CM

## 2019-01-17 DIAGNOSIS — E89.1 POST-PANCREATECTOMY DIABETES (H): ICD-10-CM

## 2019-01-17 DIAGNOSIS — Z90.410 POST-PANCREATECTOMY DIABETES (H): ICD-10-CM

## 2019-01-17 DIAGNOSIS — E89.1 POST-PANCREATECTOMY DIABETES (H): Primary | ICD-10-CM

## 2019-01-17 DIAGNOSIS — Z90.410 POST-PANCREATECTOMY DIABETES (H): Primary | ICD-10-CM

## 2019-01-17 DIAGNOSIS — E13.9 POST-PANCREATECTOMY DIABETES (H): Primary | ICD-10-CM

## 2019-01-17 DIAGNOSIS — E13.9 POST-PANCREATECTOMY DIABETES (H): ICD-10-CM

## 2019-01-17 LAB — HBA1C MFR BLD: 6.9 % (ref 0–5.6)

## 2019-01-17 PROCEDURE — 83036 HEMOGLOBIN GLYCOSYLATED A1C: CPT | Performed by: PEDIATRICS

## 2019-01-17 PROCEDURE — G0463 HOSPITAL OUTPT CLINIC VISIT: HCPCS | Mod: ZF

## 2019-01-17 PROCEDURE — 36415 COLL VENOUS BLD VENIPUNCTURE: CPT | Performed by: PEDIATRICS

## 2019-01-17 NOTE — PROGRESS NOTES
Chief Complaint   Patient presents with     RECHECK     AI/ follow up    Blood pressure (!) 157/93, pulse 66, temperature 98.7  F (37.1  C), weight 67.7 kg (149 lb 3.2 oz), last menstrual period 12/19/2013, peak flow 98 L/min, not currently breastfeeding.    Mariaa Love, CMA

## 2019-01-17 NOTE — LETTER
1/17/2019      RE: Lynn Thompson  [address] [address]       Chief Complaint   Patient presents with     RECHECK     AI/ follow up    Blood pressure (!) 157/93, pulse 66, temperature 98.7  F (37.1  C), weight 67.7 kg (149 lb 3.2 oz), last menstrual period 12/19/2013, peak flow 98 L/min, not currently breastfeeding.    Mariaa Love, [facility]  Islet Autotransplant, Diabetes Follow Up    Problem List:  Patient Active Problem List   Diagnosis     Iron deficiency anemia secondary to inadequate dietary iron intake     Gastric bypass status for obesity     Health Care Home     Chronic pain syndrome     Exocrine pancreatic insufficiency     Asplenia     BLU (obstructive sleep apnea)     S/P exploratory laparotomy     Dysthymia     Anxiety     Thyroid nodule     Acquired hypothyroidism     Post-pancreatectomy diabetes (H)     E coli bacteremia     Antibiotic-associated diarrhea     Elevated LFTs       HPI:  Lynn is a 53 year old female here for follow up o total pancreatectomy, islet cell autotransplant, splenectomy, liver biopsy (needle core), choledochoduodenostomy, feeding jejunostomy performed on 5/10/2013.  At the time of the procedure, the patient received 178,100 IEQ, or 3,209 IEQ/kg body weight. She has had two subsequent exploratory laparatomy for small bowel obstruction. Other notable endocrine history includes a complex cystic nodule in mid right thyroid lobe with 1 cm maximum diameter (saw Dr Adorno in 11/2016) which needs annual follow up U/S in Nov 2017, and mild hypothyroidism followed by PCP.  She had an episode of cholangitis and was hospitalized for 4 days 8/24/17 (fevers, RUQ pain, + Ecoli blood cx).    She was on NO insulin at her 1 year, 2 year, 3 year, 4 year transplant anniversaries and restart insulin just after 4 years post-tx, insulin restart date of  6/6/2017.  She is continuing to wean narcotics, on a suboxone wean, now  on a  patch (Suboxone 1- 0.5 mg film) daily, with no other narcotics.     At today's visit, overall Lynn is doing well overall.  Continues to have some struggles with anxiety. Has new issue of abdominal pain and GI symptoms detailed below.      1)  Diabetes:  Lynn continues to have partial islet graft function.  Her A1c is improved but above goal despite recent changes.  We have BG from meter 3 times per day that look very normal, suspect excursions occur after meals. She is not having any current reactive hypoglycemia.  She saw the dietitian after last visit which was helpful for her.    Diabetes history:  Current insulin regimen:  Lantus 5 units per day  Novolog 0.5 units per 20g, and correction scale of 0.5 u per 100 >150   TDD: 7.5 unit per day based on last assessment (stable), total insulin on average.      Recent A1c:   Lab Results   Component Value Date    A1C 6.9 2019    A1C 7.5 2018    A1C 6.3 2017    A1C 6.5 2017    A1C 7.0 2017       Hypoglycemia history:  None recent (lowest is 78 mg/dL).  The patient has had NO episodes of severe hypoglycemia (seizure, coma, or neuroglycopenic symptoms severe enough to require assistance from another person).  Blood sugars were reviewed from the patient records and/or the meter download.    Average B mg/dL SD 40 mg/dL  Number of blood sugar checks per day 2.4 in this meter-- needs strips for 4 checks per day.     2)  GI symptoms/pain:  This is a new issue since I last saw her.  When I saw her about 4 mos ago, she did not have significant pain and she was on her  suboxone patch for weaning, which is still her current dose.  However, she has now been having chronic and episodic pain symptoms, primarily triggered by eating.  Describes getting bloated/full with pain after eating, RUQ radiating to back and generalized pain, irregular stooling pattern, sometimes constipated but often going 5-6 times per day.  Always greasy  stools.  Also has gas.  Has been on Viokace alone because of gastric bypass surgery history and previous dumping (encapsulated alone did not work).  Has never tried combo, and has never had treatment for SIBO.  Current bowel regimen to prevent constipation = Miralax Qday, Senna BID,  Movantik.  She knows that the Movantik helps as she missed the dose for a week due to insurance issues and was worse.    3)  She works with therapist for anxiety, including relaxation exercises on exercise ball to help with GI system      Review of systems:  Review Of Systems  Skin: negative  Eyes: negative  Ears/Nose/Throat: negative  Respiratory: No shortness of breath, dyspnea on exertion, cough, or hemoptysis  Cardiovascular: negative  Gastrointestinal: as above  Genitourinary: negative  Musculoskeletal: negative  Neurologic: negative  Psychiatric: anxiety  Hematologic/Lymphatic/Immunologic: negative  Endocrine: as above    Past Medical and Surgical History:  Past Medical History:   Diagnosis Date     Abdominal pain, epigastric 11/05, ongoing; goes to pain clinic    probable recurrent spasm sphincter of Oddi causing biliary colic pains      Benign paroxysmal positional vertigo     occ.      Calculus of kidney 5/05    x1 on L side passed, several stones.  Has been tested for oxalate.     Chronic abdominal pain 7/17/2013     Chronic pancreatitis (H) 7/17/2013     Depressive disorder, not elsewhere classified     also occ panic spells     Diabetes (H)     post pancreatectomy     Dyspepsia and other specified disorders of function of stomach 6/99    H. 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     Pyelonephritis, unspecified 5/04, 10/8    L side     Regional enteritis of unspecified  site     inacive/remission     Sleep apnea     uses Cpap     Spasm of sphincter of Oddi     ERCP with Stent of CBD by Fernandez @ [facility] with sx relief     Spasm of sphincter of Oddi     surgical + endoscopic stenting of pancreatic duct @ [facility] 06     Thyroid nodule 2016     Ureteral calculus 10/2/2012     Vaccination not carried out      Past Surgical History:   Procedure Laterality Date     APPENDECTOMY       C NONSPECIFIC PROCEDURE      R bunion     C NONSPECIFIC PROCEDURE      T & A     C NONSPECIFIC PROCEDURE      partial ileum resection     C NONSPECIFIC PROCEDURE      R ovarian cyst     C NONSPECIFIC PROCEDURE           C NONSPECIFIC PROCEDURE      GALL BLADDER     C NONSPECIFIC PROCEDURE      CBD stent; Dr. Presley     C NONSPECIFIC PROCEDURE   &     colonoscopy     C NONSPECIFIC PROCEDURE      Gastric bypass     CHOLECYSTECTOMY       COLONOSCOPY       COMBINED CYSTOSCOPY, RETROGRADES, URETEROSCOPY, LASER HOLMIUM LITHOTRIPSY URETER(S), INSERT STENT Right 3/23/2015    Procedure: COMBINED CYSTOSCOPY, RETROGRADES, URETEROSCOPY, LASER HOLMIUM LITHOTRIPSY URETER(S), INSERT STENT;  Surgeon: Kennedi Aldana MD;  [facility]     COMBINED CYSTOSCOPY, RETROGRADES, URETEROSCOPY, LASER HOLMIUM LITHOTRIPSY URETER(S), INSERT STENT Right 2015    Procedure: COMBINED CYSTOSCOPY, RETROGRADES, URETEROSCOPY, LASER HOLMIUM LITHOTRIPSY URETER(S), INSERT STENT;  Surgeon: Kennedi Aldana MD;  [facility] OR     COSMETIC SURGERY  2002    Tummy tuck     CYSTOSCOPY, RETROGRADES, INSERT STENT URETER(S), COMBINED  10/2/2012    Procedure: COMBINED CYSTOSCOPY, RETROGRADES, INSERT STENT URETER(S);  COMBINED CYSTOSCOPY,  , INSERT LEFT STENT URETER;  Surgeon: Johny Baez MD;  Location: [facility] OR     ENT SURGERY       ESOPHAGOSCOPY, GASTROSCOPY, DUODENOSCOPY (EGD), COMBINED N/A 2018    Procedure: COMBINED ESOPHAGOSCOPY, GASTROSCOPY,  DUODENOSCOPY (EGD);  ESOPHAGOSCOPY, GASTROSCOPY, DUODENOSCOPY (EGD)    ;  Surgeon: Tamir Rodgers MD;  Location: [facility] GI     EXTRACORPOREAL SHOCK WAVE LITHOTRIPSY (ESWL)  10/16/2012    Procedure: EXTRACORPOREAL SHOCK WAVE LITHOTRIPSY (ESWL);  left EXTRACORPOREAL SHOCK WAVE LITHOTRIPSY (ESWL) ;  Surgeon: Johny Baez MD;  Location: [facility] OR     GI SURGERY  2015    Small bowel obstruction     HC LAP,LYSIS OF ADHESIONS       HERNIA REPAIR  2015     LAPAROSCOPIC LYSIS ADHESIONS N/A 2015    Procedure: LAPAROSCOPIC LYSIS ADHESIONS;  Surgeon: Aaron Early MD;  [facility]     LAPAROSCOPIC LYSIS ADHESIONS N/A 2015    Procedure: LAPAROSCOPIC LYSIS ADHESIONS;  Surgeon: Aaron Early MD;  [facility]     PANCREATECTOMY, TRANSPLANT AUTO ISLET CELL, COMBINED  5/10/2013    Procedure: COMBINED PANCREATECTOMY, TRANSPLANT AUTO ISLET CELL;  Pancreatectomy, Auto Islet Cell Transplant   hernia repair, jejunostomy tube and liver biopsies with Anesthesia General with block;  Surgeon: Aaron Early MD;  [facility] OR     TRANSPLANT  5/10/13       Family History:  New changes since last visit:  none  Family History   Problem Relation Age of Onset     Family History Negative Mother      Respiratory Father         COPD;  at 69     Genitourinary Problems Father         kidney stones     Substance Abuse Father      Depression Father      Asthma Father      Heart Disease Paternal Grandfather         M.I.     Coronary Artery Disease Paternal Grandfather      Hyperlipidemia Paternal Grandfather      Genitourinary Problems Brother         multiple brothers with kidney stones     Gastrointestinal Disease Maternal Grandmother         undiagnosed 'gut' issues     Coronary Artery Disease Maternal Grandfather      Hyperlipidemia Maternal Grandfather      Cerebrovascular Disease Paternal Grandmother         At the age of 103     Anxiety Disorder Paternal Grandmother      Osteoporosis Paternal Grandmother       Anxiety Disorder Son      Anxiety Disorder Daughter      Asthma Daughter        Social History:  Social History     Social History Narrative     Not on file     She was previously laid off from job and found that the stress reduction and getting away from work environment has helped greatly with her health.  Seeking disability.  Some stress at home including sister in law with brain tumor who is terminal.    Physical Exam:  Vitals: BP (!) 157/93   Pulse 66   Temp 98.7  F (37.1  C)   Wt 67.7 kg (149 lb 3.2 oz)   LMP 12/19/2013   PF 98 L/min   BMI 25.61 kg/m     BMI= Body mass index is 25.61 kg/m .  General:  Well-appearing, NAD  Psych:  Communicative, with normal affect         Assessment:  1.  Post pancreatectomy diabetes mellitus, s/p total pancreatectomy and islet autotransplant.    Lynn is a 53 year old with history of chronic pancreatitis who is s/p total pancreatectomy and islet autotransplant.  She was insulin independent until 6/6/2017 (just after 4 years post-tx) and now has partial islet function.    Diabetes control is much better.  A1c is 6.9%.  Under 6.5% would be even better but given her normal meter readings would not recommend changes -- suspect with bypass surgery that she still gets an early post prandial peak in glucose that is contributing to higher A1c.    Bigger issue today is new and worsening pain, with stool irregularities, greasy stool, gas, distention with eating.  She is concerned about partial obstruction.  Symptoms also concerning for delayed gastric emptying but this is odd, as she has always had rapid gastric emptying in past with her bypass history.    She has a GI appointment in 1 month. In the interim, I suggested we try two changes sequentially:  -- Mixing Viokace and ZenPep to see if this combination regulates her bowel movements better and eliminates malabsorption (which may be causing some of these symptoms).  We started with this because our nurse was able to provide  a sample of ZenPep to trial this.  She should call in 1 week.  -- if Symptoms persist in 1 week, we will then treat empirically for SIBO using current GI protocol.    Plan:  1.  Changes to current diabetes regimen:  Patient Instructions     1)  Lantus:  Keep the 5 unit Lantus daily.      2)  Novolog coverage: same                     What your blood glucose is before eating:   How much you plan to eat: 80- 150 mg/dL 151- 250 mg/dL 251 to 350 mg/dL   Less than 20 grams carb 0 0.5 1   21-40 grams carb 0.5 1 1.5   41-60 grams carb 1 1.5 2   More than 60 grams carb 1.5 2 2.5      3)  New GI issues could be due to a number of issues include motility issues (anxiety may feed into), delayed gastric emptying, partial obstruction, enzyme dosing need adjustment or small bowel overgrowth.  Let me talk to the GI team if  We can try treating some of the 'easy' stuff while you are waiting to be seen, like trial of antibiotics for small bowel overgrowth.      Thursday May 16 at 10:00am.      2.  Frequency of blood sugar checks:  premeal and bedtime, and as needed for hypoglycemia (6 strip per day).    3.  Continue routine follow up for autoislet transplant patients:  Mixed meal test (6 mL/kg BoostHP to max of 360 mL) at 3 months, 6 months, and once a year post transplant.  Hemoglobin A1c levels at these time points and quarterly.  4.  Other issues addressed today:  As above    Follow up:  As above        Contact me for questions at [phone] or [phone].  Emergency number to reach pediatric endocrinology after hours is [phone].        Adriana Robles MD  , Pediatric Endocrinology and Diabetes  [facility] [place]  [facility]    I spent 30 minutes face to face with Lynn, with more than 50% of that time counseling on plan of care.    Adriana Robles MD

## 2019-01-17 NOTE — PATIENT INSTRUCTIONS
1)  Lantus:  Keep the 5 unit Lantus daily.      2)  Novolog coverage: same                     What your blood glucose is before eating:   How much you plan to eat: 80- 150 mg/dL 151- 250 mg/dL 251 to 350 mg/dL   Less than 20 grams carb 0 0.5 1   21-40 grams carb 0.5 1 1.5   41-60 grams carb 1 1.5 2   More than 60 grams carb 1.5 2 2.5      3)  New GI issues could be due to a number of issues include motility issues (anxiety may feed into), delayed gastric emptying, partial obstruction, enzyme dosing need adjustment or small bowel overgrowth.  Let me talk to the GI team if  We can try treating some of the 'easy' stuff while you are waiting to be seen, like trial of antibiotics for small bowel overgrowth.      Thursday May 16 at 10:00am.

## 2019-01-18 NOTE — PROGRESS NOTES
[facility]  Islet Autotransplant, Diabetes Follow Up    Problem List:  Patient Active Problem List   Diagnosis     Iron deficiency anemia secondary to inadequate dietary iron intake     Gastric bypass status for obesity     Health Care Home     Chronic pain syndrome     Exocrine pancreatic insufficiency     Asplenia     BLU (obstructive sleep apnea)     S/P exploratory laparotomy     Dysthymia     Anxiety     Thyroid nodule     Acquired hypothyroidism     Post-pancreatectomy diabetes (H)     E coli bacteremia     Antibiotic-associated diarrhea     Elevated LFTs       HPI:  Lynn is a 53 year old female here for follow up o total pancreatectomy, islet cell autotransplant, splenectomy, liver biopsy (needle core), choledochoduodenostomy, feeding jejunostomy performed on 5/10/2013.  At the time of the procedure, the patient received 178,100 IEQ, or 3,209 IEQ/kg body weight. She has had two subsequent exploratory laparatomy for small bowel obstruction. Other notable endocrine history includes a complex cystic nodule in mid right thyroid lobe with 1 cm maximum diameter (saw Dr Adorno in 11/2016) which needs annual follow up U/S in Nov 2017, and mild hypothyroidism followed by PCP.  She had an episode of cholangitis and was hospitalized for 4 days 8/24/17 (fevers, RUQ pain, + Ecoli blood cx).    She was on NO insulin at her 1 year, 2 year, 3 year, 4 year transplant anniversaries and restart insulin just after 4 years post-tx, insulin restart date of  6/6/2017.  She is continuing to wean narcotics, on a suboxone wean, now on a 1/8 patch (Suboxone 1- 0.5 mg film) daily, with no other narcotics.     At today's visit, overall Lynn is doing well overall.  Continues to have some struggles with anxiety. Has new issue of abdominal pain and GI symptoms detailed below.      1)  Diabetes:  Lynn continues to have partial islet graft function.  Her A1c is improved but above goal despite  recent changes.  We have BG from meter 3 times per day that look very normal, suspect excursions occur after meals. She is not having any current reactive hypoglycemia.  She saw the dietitian after last visit which was helpful for her.    Diabetes history:  Current insulin regimen:  Lantus 5 units per day  Novolog 0.5 units per 20g, and correction scale of 0.5 u per 100 >150   TDD: 7.5 unit per day based on last assessment (stable), total insulin on average.      Recent A1c:   Lab Results   Component Value Date    A1C 6.9 2019    A1C 7.5 2018    A1C 6.3 2017    A1C 6.5 2017    A1C 7.0 2017       Hypoglycemia history:  None recent (lowest is 78 mg/dL).  The patient has had NO episodes of severe hypoglycemia (seizure, coma, or neuroglycopenic symptoms severe enough to require assistance from another person).  Blood sugars were reviewed from the patient records and/or the meter download.    Average B mg/dL SD 40 mg/dL  Number of blood sugar checks per day 2.4 in this meter-- needs strips for 4 checks per day.     2)  GI symptoms/pain:  This is a new issue since I last saw her.  When I saw her about 4 mos ago, she did not have significant pain and she was on her  suboxone patch for weaning, which is still her current dose.  However, she has now been having chronic and episodic pain symptoms, primarily triggered by eating.  Describes getting bloated/full with pain after eating, RUQ radiating to back and generalized pain, irregular stooling pattern, sometimes constipated but often going 5-6 times per day.  Always greasy stools.  Also has gas.  Has been on Viokace alone because of gastric bypass surgery history and previous dumping (encapsulated alone did not work).  Has never tried combo, and has never had treatment for SIBO.  Current bowel regimen to prevent constipation = Miralax Qday, Senna BID,  Movantik.  She knows that the Movantik helps as she missed the dose for a week due  to insurance issues and was worse.    3)  She works with therapist for anxiety, including relaxation exercises on exercise ball to help with GI system      Review of systems:  Review Of Systems  Skin: negative  Eyes: negative  Ears/Nose/Throat: negative  Respiratory: No shortness of breath, dyspnea on exertion, cough, or hemoptysis  Cardiovascular: negative  Gastrointestinal: as above  Genitourinary: negative  Musculoskeletal: negative  Neurologic: negative  Psychiatric: anxiety  Hematologic/Lymphatic/Immunologic: negative  Endocrine: as above    Past Medical and Surgical History:  Past Medical History:   Diagnosis Date     Abdominal pain, epigastric 11/05, ongoing; goes to pain clinic    probable recurrent spasm sphincter of Oddi causing biliary colic pains      Benign paroxysmal positional vertigo     occ.      Calculus of kidney 5/05    x1 on L side passed, several stones.  Has been tested for oxalate.     Chronic abdominal pain 7/17/2013     Chronic pancreatitis (H) 7/17/2013     Depressive disorder, not elsewhere classified     also occ panic spells     Diabetes (H)     post pancreatectomy     Dyspepsia and other specified disorders of function of stomach 6/99    H. 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    ERCP with Stent of CBD by Fernandez @ [facility] with sx relief     Spasm of sphincter of Oddi     surgical + endoscopic stenting of pancreatic duct @ [facility] [place] 5/23/06     Thyroid nodule 11/1/2016     Ureteral calculus 10/2/2012     Vaccination not carried out      Past Surgical  History:   Procedure Laterality Date     APPENDECTOMY       C NONSPECIFIC PROCEDURE      R bunion     C NONSPECIFIC PROCEDURE      T & A     C NONSPECIFIC PROCEDURE      partial ileum resection     C NONSPECIFIC PROCEDURE      R ovarian cyst     C NONSPECIFIC PROCEDURE           C NONSPECIFIC PROCEDURE      GALL BLADDER     C NONSPECIFIC PROCEDURE      CBD stent; Dr. Presley     C NONSPECIFIC PROCEDURE   &     colonoscopy     C NONSPECIFIC PROCEDURE      Gastric bypass     CHOLECYSTECTOMY       COLONOSCOPY       COMBINED CYSTOSCOPY, RETROGRADES, URETEROSCOPY, LASER HOLMIUM LITHOTRIPSY URETER(S), INSERT STENT Right 3/23/2015    Procedure: COMBINED CYSTOSCOPY, RETROGRADES, URETEROSCOPY, LASER HOLMIUM LITHOTRIPSY URETER(S), INSERT STENT;  Surgeon: Kennedi Aldana MD;  [facility]     COMBINED CYSTOSCOPY, RETROGRADES, URETEROSCOPY, LASER HOLMIUM LITHOTRIPSY URETER(S), INSERT STENT Right 2015    Procedure: COMBINED CYSTOSCOPY, RETROGRADES, URETEROSCOPY, LASER HOLMIUM LITHOTRIPSY URETER(S), INSERT STENT;  Surgeon: Kenneid Aldana MD;  [facility]     COSMETIC SURGERY  2002    Tummy tuck     CYSTOSCOPY, RETROGRADES, INSERT STENT URETER(S), COMBINED  10/2/2012    Procedure: COMBINED CYSTOSCOPY, RETROGRADES, INSERT STENT URETER(S);  COMBINED CYSTOSCOPY,  , INSERT LEFT STENT URETER;  Surgeon: Johny Baez MD;  Location: [facility] OR     ENT SURGERY       ESOPHAGOSCOPY, GASTROSCOPY, DUODENOSCOPY (EGD), COMBINED N/A 2018    Procedure: COMBINED ESOPHAGOSCOPY, GASTROSCOPY, DUODENOSCOPY (EGD);  ESOPHAGOSCOPY, GASTROSCOPY, DUODENOSCOPY (EGD)    ;  Surgeon: Tamir Rodgers MD;  Location:  GI     EXTRACORPOREAL SHOCK WAVE LITHOTRIPSY (ESWL)  10/16/2012    Procedure: EXTRACORPOREAL SHOCK WAVE LITHOTRIPSY (ESWL);  left EXTRACORPOREAL SHOCK WAVE LITHOTRIPSY (ESWL) ;  Surgeon: Johny Baez MD;  Location: [facility] OR     GI SURGERY   2015    Small bowel obstruction     HC LAP,LYSIS OF ADHESIONS       HERNIA REPAIR  2015     LAPAROSCOPIC LYSIS ADHESIONS N/A 2015    Procedure: LAPAROSCOPIC LYSIS ADHESIONS;  Surgeon: Aaron Early MD;  [facility]     LAPAROSCOPIC LYSIS ADHESIONS N/A 2015    Procedure: LAPAROSCOPIC LYSIS ADHESIONS;  Surgeon: Aaron Early MD;  [facility]     PANCREATECTOMY, TRANSPLANT AUTO ISLET CELL, COMBINED  5/10/2013    Procedure: COMBINED PANCREATECTOMY, TRANSPLANT AUTO ISLET CELL;  Pancreatectomy, Auto Islet Cell Transplant   hernia repair, jejunostomy tube and liver biopsies with Anesthesia General with block;  Surgeon: Aaron Early MD;  [facility]     TRANSPLANT  5/10/13       Family History:  New changes since last visit:  none  Family History   Problem Relation Age of Onset     Family History Negative Mother      Respiratory Father         COPD;  at 69     Genitourinary Problems Father         kidney stones     Substance Abuse Father      Depression Father      Asthma Father      Heart Disease Paternal Grandfather         M.I.     Coronary Artery Disease Paternal Grandfather      Hyperlipidemia Paternal Grandfather      Genitourinary Problems Brother         multiple brothers with kidney stones     Gastrointestinal Disease Maternal Grandmother         undiagnosed 'gut' issues     Coronary Artery Disease Maternal Grandfather      Hyperlipidemia Maternal Grandfather      Cerebrovascular Disease Paternal Grandmother         At the age of 103     Anxiety Disorder Paternal Grandmother      Osteoporosis Paternal Grandmother      Anxiety Disorder Son      Anxiety Disorder Daughter      Asthma Daughter        Social History:  Social History     Social History Narrative     Not on file     She was previously laid off from job and found that the stress reduction and getting away from work environment has helped greatly with her health.  Seeking disability.  Some stress at home including  sister in law with brain tumor who is terminal.    Physical Exam:  Vitals: BP (!) 157/93   Pulse 66   Temp 98.7  F (37.1  C)   Wt 67.7 kg (149 lb 3.2 oz)   LMP 12/19/2013   PF 98 L/min   BMI 25.61 kg/m    BMI= Body mass index is 25.61 kg/m .  General:  Well-appearing, NAD  Psych:  Communicative, with normal affect         Assessment:  1.  Post pancreatectomy diabetes mellitus, s/p total pancreatectomy and islet autotransplant.    Lynn is a 53 year old with history of chronic pancreatitis who is s/p total pancreatectomy and islet autotransplant.  She was insulin independent until 6/6/2017 (just after 4 years post-tx) and now has partial islet function.    Diabetes control is much better.  A1c is 6.9%.  Under 6.5% would be even better but given her normal meter readings would not recommend changes -- suspect with bypass surgery that she still gets an early post prandial peak in glucose that is contributing to higher A1c.    Bigger issue today is new and worsening pain, with stool irregularities, greasy stool, gas, distention with eating.  She is concerned about partial obstruction.  Symptoms also concerning for delayed gastric emptying but this is odd, as she has always had rapid gastric emptying in past with her bypass history.    She has a GI appointment in 1 month. In the interim, I suggested we try two changes sequentially:  -- Mixing Viokace and ZenPep to see if this combination regulates her bowel movements better and eliminates malabsorption (which may be causing some of these symptoms).  We started with this because our nurse was able to provide a sample of ZenPep to trial this.  She should call in 1 week.  -- if Symptoms persist in 1 week, we will then treat empirically for SIBO using current GI protocol.    Plan:  1.  Changes to current diabetes regimen:  Patient Instructions     1)  Lantus:  Keep the 5 unit Lantus daily.      2)  Novolog coverage: same                     What your blood glucose  is before eating:   How much you plan to eat: 80- 150 mg/dL 151- 250 mg/dL 251 to 350 mg/dL   Less than 20 grams carb 0 0.5 1   21-40 grams carb 0.5 1 1.5   41-60 grams carb 1 1.5 2   More than 60 grams carb 1.5 2 2.5      3)  New GI issues could be due to a number of issues include motility issues (anxiety may feed into), delayed gastric emptying, partial obstruction, enzyme dosing need adjustment or small bowel overgrowth.  Let me talk to the GI team if  We can try treating some of the 'easy' stuff while you are waiting to be seen, like trial of antibiotics for small bowel overgrowth.      Thursday May 16 at 10:00am.      2.  Frequency of blood sugar checks:  premeal and bedtime, and as needed for hypoglycemia (6 strip per day).    3.  Continue routine follow up for autoislet transplant patients:  Mixed meal test (6 mL/kg BoostHP to max of 360 mL) at 3 months, 6 months, and once a year post transplant.  Hemoglobin A1c levels at these time points and quarterly.  4.  Other issues addressed today:  As above    Follow up:  As above        Contact me for questions at [phone] or [phone].  Emergency number to reach pediatric endocrinology after hours is [phone].        Adriana Robles MD  , Pediatric Endocrinology and Diabetes  [facility]  [facility]    I spent 30 minutes face to face with Lynn, with more than 50% of that time counseling on plan of care.

## 2019-01-22 ENCOUNTER — OFFICE VISIT (OUTPATIENT)
Dept: FAMILY MEDICINE | Facility: CLINIC | Age: 54
End: 2019-01-22
Payer: COMMERCIAL

## 2019-01-22 ENCOUNTER — TELEPHONE (OUTPATIENT)
Dept: FAMILY MEDICINE | Facility: CLINIC | Age: 54
End: 2019-01-22

## 2019-01-22 VITALS
DIASTOLIC BLOOD PRESSURE: 75 MMHG | SYSTOLIC BLOOD PRESSURE: 115 MMHG | HEIGHT: 64 IN | TEMPERATURE: 98.8 F | HEART RATE: 128 BPM | BODY MASS INDEX: 25.61 KG/M2 | WEIGHT: 150 LBS

## 2019-01-22 DIAGNOSIS — G89.4 CHRONIC PAIN SYNDROME: ICD-10-CM

## 2019-01-22 DIAGNOSIS — Z12.31 ENCOUNTER FOR SCREENING MAMMOGRAM FOR BREAST CANCER: ICD-10-CM

## 2019-01-22 DIAGNOSIS — E03.9 ACQUIRED HYPOTHYROIDISM: Primary | ICD-10-CM

## 2019-01-22 DIAGNOSIS — N95.2 VAGINAL ATROPHY: ICD-10-CM

## 2019-01-22 DIAGNOSIS — J01.90 ACUTE SINUSITIS WITH SYMPTOMS > 10 DAYS: ICD-10-CM

## 2019-01-22 DIAGNOSIS — F41.9 ANXIETY: ICD-10-CM

## 2019-01-22 PROCEDURE — 84439 ASSAY OF FREE THYROXINE: CPT | Performed by: FAMILY MEDICINE

## 2019-01-22 PROCEDURE — 36415 COLL VENOUS BLD VENIPUNCTURE: CPT | Performed by: FAMILY MEDICINE

## 2019-01-22 PROCEDURE — 99214 OFFICE O/P EST MOD 30 MIN: CPT | Performed by: FAMILY MEDICINE

## 2019-01-22 PROCEDURE — 84443 ASSAY THYROID STIM HORMONE: CPT | Performed by: FAMILY MEDICINE

## 2019-01-22 RX ORDER — CEFUROXIME AXETIL 500 MG/1
500 TABLET ORAL 2 TIMES DAILY
Qty: 20 TABLET | Refills: 0 | Status: SHIPPED | OUTPATIENT
Start: 2019-01-22 | End: 2019-07-09

## 2019-01-22 RX ORDER — ESTRADIOL 0.1 MG/G
CREAM VAGINAL
Qty: 42.5 G | Refills: 11 | Status: SHIPPED | OUTPATIENT
Start: 2019-01-22 | End: 2019-03-26

## 2019-01-22 ASSESSMENT — PATIENT HEALTH QUESTIONNAIRE - PHQ9
SUM OF ALL RESPONSES TO PHQ QUESTIONS 1-9: 23
5. POOR APPETITE OR OVEREATING: NEARLY EVERY DAY

## 2019-01-22 ASSESSMENT — ANXIETY QUESTIONNAIRES
7. FEELING AFRAID AS IF SOMETHING AWFUL MIGHT HAPPEN: MORE THAN HALF THE DAYS
GAD7 TOTAL SCORE: 19
5. BEING SO RESTLESS THAT IT IS HARD TO SIT STILL: MORE THAN HALF THE DAYS
3. WORRYING TOO MUCH ABOUT DIFFERENT THINGS: NEARLY EVERY DAY
IF YOU CHECKED OFF ANY PROBLEMS ON THIS QUESTIONNAIRE, HOW DIFFICULT HAVE THESE PROBLEMS MADE IT FOR YOU TO DO YOUR WORK, TAKE CARE OF THINGS AT HOME, OR GET ALONG WITH OTHER PEOPLE: EXTREMELY DIFFICULT
2. NOT BEING ABLE TO STOP OR CONTROL WORRYING: NEARLY EVERY DAY
1. FEELING NERVOUS, ANXIOUS, OR ON EDGE: NEARLY EVERY DAY
6. BECOMING EASILY ANNOYED OR IRRITABLE: NEARLY EVERY DAY

## 2019-01-22 ASSESSMENT — MIFFLIN-ST. JEOR: SCORE: 1260.4

## 2019-01-22 NOTE — TELEPHONE ENCOUNTER
Panel Management Review      Patient has the following on her problem list: None      Composite cancer screening  Chart review shows that this patient is due/due soon for the following None  Summary:    Patient is due/failing the following:   PHQ9 done today    Action needed:   None at this time as pt was in for OV today with Dr. Todd James CMA           Chart routed to none .

## 2019-01-22 NOTE — PROGRESS NOTES
SUBJECTIVE:   Lynn Thompson is a 53 year old female who presents to clinic today for the following health issues:      Medication Followup of synthroid     Taking Medication as prescribed: yes    Side Effects:  None    Medication Helping Symptoms:  yes       Possible sinus infection for about 10 days - facial pressure, upper tooth pain, ear pressure. No fevers.     Having trouble with persistent abdominal pain, anorexia. Following with GI, appt with them next month. This is frustrating for her, and her anxiety is much worse.     Sister in law passed away 4 days ago, so she is also having a hard time dealing with this.     Working with pain clinic on MH medications, sleep meds.   On cymbalta, lexapro daily. Clonidine and ativan prn. Using gbp and trazodone for sleep.   Sees therapist every 2 weeks.   Contracts for safety today.     Needs refills of estradiol, using vaginally twice weekly, no side effects.       Problem list and histories reviewed & adjusted, as indicated.  Additional history: none    Patient Active Problem List   Diagnosis     Iron deficiency anemia secondary to inadequate dietary iron intake     Gastric bypass status for obesity     Health Care Home     Chronic pain syndrome     Exocrine pancreatic insufficiency     Asplenia     BLU (obstructive sleep apnea)     S/P exploratory laparotomy     Dysthymia     Anxiety     Thyroid nodule     Acquired hypothyroidism     Post-pancreatectomy diabetes (H)     E coli bacteremia     Antibiotic-associated diarrhea     Elevated LFTs     Past Surgical History:   Procedure Laterality Date     APPENDECTOMY       C NONSPECIFIC PROCEDURE      R bunion     C NONSPECIFIC PROCEDURE      T & A     C NONSPECIFIC PROCEDURE      partial ileum resection     C NONSPECIFIC PROCEDURE      R ovarian cyst     C NONSPECIFIC PROCEDURE           C NONSPECIFIC PROCEDURE      GALL BLADDER     C NONSPECIFIC PROCEDURE      CBD stent;   Presley     C NONSPECIFIC PROCEDURE  6/02 & 12/05    colonoscopy     C NONSPECIFIC PROCEDURE  4/03    Gastric bypass     CHOLECYSTECTOMY       COLONOSCOPY       COMBINED CYSTOSCOPY, RETROGRADES, URETEROSCOPY, LASER HOLMIUM LITHOTRIPSY URETER(S), INSERT STENT Right 3/23/2015    Procedure: COMBINED CYSTOSCOPY, RETROGRADES, URETEROSCOPY, LASER HOLMIUM LITHOTRIPSY URETER(S), INSERT STENT;  Surgeon: Kennedi Aldana MD;  [facility]     COMBINED CYSTOSCOPY, RETROGRADES, URETEROSCOPY, LASER HOLMIUM LITHOTRIPSY URETER(S), INSERT STENT Right 4/20/2015    Procedure: COMBINED CYSTOSCOPY, RETROGRADES, URETEROSCOPY, LASER HOLMIUM LITHOTRIPSY URETER(S), INSERT STENT;  Surgeon: Kennedi Aldana MD;  [facility]     COSMETIC SURGERY  6/24/2002    Tummy sherrieck     CYSTOSCOPY, RETROGRADES, INSERT STENT URETER(S), COMBINED  10/2/2012    Procedure: COMBINED CYSTOSCOPY, RETROGRADES, INSERT STENT URETER(S);  COMBINED CYSTOSCOPY,  , INSERT LEFT STENT URETER;  Surgeon: Johny Baez MD;  [facility]: [facility] OR     ENT SURGERY       ESOPHAGOSCOPY, GASTROSCOPY, DUODENOSCOPY (EGD), COMBINED N/A 1/24/2018    Procedure: COMBINED ESOPHAGOSCOPY, GASTROSCOPY, DUODENOSCOPY (EGD);  ESOPHAGOSCOPY, GASTROSCOPY, DUODENOSCOPY (EGD)    ;  Surgeon: Tamir Rodgers MD;  Location:  GI     EXTRACORPOREAL SHOCK WAVE LITHOTRIPSY (ESWL)  10/16/2012    Procedure: EXTRACORPOREAL SHOCK WAVE LITHOTRIPSY (ESWL);  left EXTRACORPOREAL SHOCK WAVE LITHOTRIPSY (ESWL) ;  Surgeon: Johny Baez MD;  [facility]: [facility]     GI SURGERY  12/29/2015    Small bowel obstruction     HC LAP,LYSIS OF ADHESIONS       HERNIA REPAIR  2/2015     LAPAROSCOPIC LYSIS ADHESIONS N/A 2/20/2015    Procedure: LAPAROSCOPIC LYSIS ADHESIONS;  Surgeon: Aaron Early MD;  [facility]     LAPAROSCOPIC LYSIS ADHESIONS N/A 12/29/2015    Procedure: LAPAROSCOPIC LYSIS ADHESIONS;  Surgeon: Aaron Early MD;  [facility]     PANCREATECTOMY, TRANSPLANT AUTO ISLET CELL,  "COMBINED  5/10/2013    Procedure: COMBINED PANCREATECTOMY, TRANSPLANT AUTO ISLET CELL;  Pancreatectomy, Auto Islet Cell Transplant   hernia repair, jejunostomy tube and liver biopsies with Anesthesia General with block;  Surgeon: Aaron Early MD;  [facility]     TRANSPLANT  5/10/13       Social History     Tobacco Use     Smoking status: Never Smoker     Smokeless tobacco: Never Used   Substance Use Topics     Alcohol use: No     Alcohol/week: 0.0 oz     Family History   Problem Relation Age of Onset     Family History Negative Mother      Respiratory Father         COPD;  at 69     Genitourinary Problems Father         kidney stones     Substance Abuse Father      Depression Father      Asthma Father      Heart Disease Paternal Grandfather         M.I.     Coronary Artery Disease Paternal Grandfather      Hyperlipidemia Paternal Grandfather      Genitourinary Problems Brother         multiple brothers with kidney stones     Gastrointestinal Disease Maternal Grandmother         undiagnosed 'gut' issues     Coronary Artery Disease Maternal Grandfather      Hyperlipidemia Maternal Grandfather      Cerebrovascular Disease Paternal Grandmother         At the age of 103     Anxiety Disorder Paternal Grandmother      Osteoporosis Paternal Grandmother      Anxiety Disorder Son      Anxiety Disorder Daughter      Asthma Daughter            Reviewed and updated as needed this visit by clinical staff  Allergies       Reviewed and updated as needed this visit by Provider         ROS:  Constitutional, HEENT, cardiovascular, pulmonary, gi and gu systems are negative, except as otherwise noted.    OBJECTIVE:     /75 (BP Location: Left leg, Patient Position: Chair, Cuff Size: Adult Regular)   Pulse 128   Temp 98.8  F (37.1  C) (Oral)   Ht 1.626 m (5' 4\")   Wt 68 kg (150 lb)   LMP 2013   Breastfeeding? No   BMI 25.75 kg/m    Body mass index is 25.75 kg/m .  GENERAL: healthy, alert and no " distress  EYES: Eyes grossly normal to inspection, PERRL and conjunctivae and sclerae normal  HENT: ear canals and TM's normal, nose and mouth without ulcers or lesions  NECK: no adenopathy, no asymmetry, masses, or scars and thyroid normal to palpation  RESP: lungs clear to auscultation - no rales, rhonchi or wheezes  CV: regular rate and rhythm, normal S1 S2, no S3 or S4, no murmur, click or rub, no peripheral edema and peripheral pulses strong  PSYCH: mentation appears normal, affect tearful    Diagnostic Test Results:  PHQ9 - 29  GAD7 - 19     ASSESSMENT/PLAN:     1. Acquired hypothyroidism - recheck thyroid levels today  - TSH  - T4, free    2. Acute sinusitis with symptoms > 10 days - will treat with abx given immune compromised status  - cefuroxime (CEFTIN) 500 MG tablet; Take 1 tablet (500 mg) by mouth 2 times daily  Dispense: 20 tablet; Refill: 0    3. Vaginal atrophy - stable, refills, mammogram ordered.   - estradiol (ESTRACE) 0.1 MG/GM vaginal cream; PLACE 2 GRAMS VAGINALLY TWICE A WEEK  Dispense: 42.5 g; Refill: 11    4. Anxiety - uncontrolled recently with ongoing pain issues and recent loss of loved one. Encouraged follow up with MH team, suggested medication dosage increase.     5. Chronic pain syndrome - uncontrolled, working with pain team    As always, please let us know if you have any questions. Our clinic number is [phone].    My best,  Maryana Brooks MD  [facility]          Maryana Brooks MD  [facility]

## 2019-01-23 ASSESSMENT — ANXIETY QUESTIONNAIRES: GAD7 TOTAL SCORE: 19

## 2019-01-24 LAB
T4 FREE SERPL-MCNC: 0.9 NG/DL (ref 0.76–1.46)
TSH SERPL DL<=0.005 MIU/L-ACNC: 1.23 MU/L (ref 0.4–4)

## 2019-01-24 RX ORDER — LEVOTHYROXINE SODIUM 88 UG/1
88 TABLET ORAL DAILY
Qty: 90 TABLET | Refills: 3 | Status: SHIPPED | OUTPATIENT
Start: 2019-01-24 | End: 2019-07-09

## 2019-01-25 DIAGNOSIS — R11.0 NAUSEA: ICD-10-CM

## 2019-01-25 NOTE — TELEPHONE ENCOUNTER
"Requested Prescriptions   Pending Prescriptions Disp Refills     ranitidine (ZANTAC) 150 MG tablet 180 tablet 2    Last Written Prescription Date:  05/08/2018  Last Fill Quantity: 180 tablet,  # refills: 2   Last office visit: 1/22/2019 with prescribing provider:  01/22/2019   Future Office Visit:     Sig: Take 1 tablet (150 mg) by mouth 2 times daily    H2 Blockers Protocol Passed - 1/25/2019  7:19 AM       Passed - Patient is age 12 or older       Passed - Recent (12 mo) or future (30 days) visit within the authorizing provider's specialty    Patient had office visit in the last 12 months or has a visit in the next 30 days with authorizing provider or within the authorizing provider's specialty.  See \"Patient Info\" tab in inbasket, or \"Choose Columns\" in Meds & Orders section of the refill encounter.             Passed - Medication is active on med list        Edy DHILLON  "

## 2019-02-04 ENCOUNTER — TELEPHONE (OUTPATIENT)
Dept: TRANSPLANT | Facility: CLINIC | Age: 54
End: 2019-02-04

## 2019-02-04 DIAGNOSIS — K63.89 INTESTINAL BACTERIAL OVERGROWTH: Primary | ICD-10-CM

## 2019-02-04 DIAGNOSIS — B37.31 CANDIDA VAGINITIS: ICD-10-CM

## 2019-02-04 RX ORDER — CIPROFLOXACIN 500 MG/1
500 TABLET, FILM COATED ORAL 2 TIMES DAILY
Qty: 20 TABLET | Refills: 0 | Status: SHIPPED | OUTPATIENT
Start: 2019-02-04 | End: 2019-04-01

## 2019-02-04 RX ORDER — FLUCONAZOLE 50 MG/1
150 TABLET ORAL ONCE
Qty: 3 TABLET | Refills: 1 | Status: SHIPPED | OUTPATIENT
Start: 2019-02-04 | End: 2019-04-27

## 2019-02-04 NOTE — TELEPHONE ENCOUNTER
See  E-mails below :  Discussed SBO treatment with Papo Presley and Ed today and will start Alex on Ciprofloxacillin 500mg BID for  10days ( she has appt with Dr Presley 2/13/19).She has taken cipro in the past without issues.  Violetta lam has had trouble with Vaginal yeast infections after ABX so per Dr Robles will prescribe a dose of fluconazole.    Lynn,    Let's continue the Creon/Viokace mix but let's do a course of antibiotics for small bowel overgrowth.    Meagan, do you have Fernandez's preferred protocol and we can use this?            Good Evening,    I am contacting both of you regarding an update of the Viocase and Creon trial. After the first couple days my loud and painful gut sounds, bloating, nausea and the amount of oily stools seemed to have improved a bit. However, towards the end of trial the loud and painful gut sounds, bloating and nausea returned but there was a slight improvement on the oily stools.    Thanks for your time,    Lynn ??

## 2019-02-06 ENCOUNTER — DOCUMENTATION ONLY (OUTPATIENT)
Dept: CARE COORDINATION | Facility: CLINIC | Age: 54
End: 2019-02-06

## 2019-02-08 DIAGNOSIS — K21.9 GASTROESOPHAGEAL REFLUX DISEASE WITHOUT ESOPHAGITIS: ICD-10-CM

## 2019-02-08 DIAGNOSIS — R11.0 NAUSEA: ICD-10-CM

## 2019-02-08 DIAGNOSIS — Z98.84 GASTRIC BYPASS STATUS FOR OBESITY: ICD-10-CM

## 2019-02-08 RX ORDER — OMEPRAZOLE 40 MG/1
40 CAPSULE, DELAYED RELEASE ORAL 2 TIMES DAILY
Qty: 180 CAPSULE | Refills: 2 | Status: SHIPPED | OUTPATIENT
Start: 2019-02-08 | End: 2019-07-09

## 2019-02-08 NOTE — TELEPHONE ENCOUNTER
"Requested Prescriptions   Pending Prescriptions Disp Refills     omeprazole (PRILOSEC) 40 MG DR capsule 180 capsule 1    Last Written Prescription Date:  08/09/2018  Last Fill Quantity: 180 capsule,  # refills: 0   Last office visit: 1/22/2019 with prescribing provider:  01/22/2019   Future Office Visit:     Sig: Take 1 capsule (40 mg) by mouth 2 times daily Take 30-60 minutes before a meal.    PPI Protocol Passed - 2/8/2019  9:24 AM       Passed - Not on Clopidogrel (unless Pantoprazole ordered)       Passed - No diagnosis of osteoporosis on record       Passed - Recent (12 mo) or future (30 days) visit within the authorizing provider's specialty    Patient had office visit in the last 12 months or has a visit in the next 30 days with authorizing provider or within the authorizing provider's specialty.  See \"Patient Info\" tab in inbasket, or \"Choose Columns\" in Meds & Orders section of the refill encounter.             Passed - Medication is active on med list       Passed - Patient is age 18 or older       Passed - No active pregnacy on record       Passed - No positive pregnancy test in past 12 months        Edy RAMOST    "

## 2019-02-11 ASSESSMENT — ENCOUNTER SYMPTOMS
BOWEL INCONTINENCE: 1
SINUS CONGESTION: 1
NECK PAIN: 1
RECTAL PAIN: 1
BACK PAIN: 0
SORE THROAT: 0
MUSCLE WEAKNESS: 1
BLOOD IN STOOL: 0
PANIC: 1
ARTHRALGIAS: 1
SINUS PAIN: 1
SMELL DISTURBANCE: 0
JOINT SWELLING: 1
INSOMNIA: 1
HEARTBURN: 1
TROUBLE SWALLOWING: 0
ABDOMINAL PAIN: 1
MUSCLE CRAMPS: 1
CONSTIPATION: 0
NAUSEA: 1
VOMITING: 1
DECREASED CONCENTRATION: 1
NECK MASS: 0
HOARSE VOICE: 0
DEPRESSION: 1
BLOATING: 1
MYALGIAS: 1
TASTE DISTURBANCE: 0
NERVOUS/ANXIOUS: 1
DIARRHEA: 1
JAUNDICE: 0
STIFFNESS: 1

## 2019-02-12 NOTE — PROGRESS NOTES
Advanced Endoscopy/Pancreaticobiliary Consultation      Pt known to me from pre TPIAT care. TPIAT in 2013. Now main issues are gut related- diarrhea, loose greasy stools. Is on senna and miralax for unclear reasons. Susposedly because of excess stool on CT. ALso PSBO several times, post lesion lysis x 2 by lap in past.  Rest of details below. Minimal insulin requirment.     IMP: 60 minutes more than 50% counseling. Since main problem is diarrhea, not responsive to various manipulations of panc enzymes, and not responsive to course of cipro for SIBO, suspect it may be medication induced. ALSO may be related to wean off many years of narcotic analgesics,, and perhaps due to siboxone.     Rec wean down senna and miralax. Add fiber in form of metamucil or citrucel.   ALSO: recommend start ACUPUNCTURE which may be useful adjunct to motility disorders. Perhaps dr Brooks can recommend?  Follow-up in one month w me and dietitian Shadia Fisher.       Seen and examined with APRN,  agree with findings and recommendations.    Kirill Presley MD GI Staff        CC/REFERRING MD:  Maryana Brooks  REASON FOR CONSULTATION: Abdominal Pain, diarrhea & Post Pancreatectomy islet autotransplant.      Assessment /Plan:   Lynn Thompson is 54-year old woman chronic pancreatitis s/p pancreatectomy Islet cell transplantation in 2013. She has history of exocrine pancreatic insufficiency, ongoing abdominal pain, gastric by-pass for obesity, iron deficiency, BLU, chronic pain syndrome, antibiotic associated diarrhea, E-coli bacteremia s/p exploratory  laparotomy with history of elevated LFTs and anxiety.  Abdominal pian: Patient has had ongoing abdominal pain for quit some time and uses suboxone         2.     --  --  --      RTC 3 months                HISTORY OF PRESENT ILLNESS:      Lynn Thompson is 54-year old woman chronic pancreatitis s/p pancreatectomy Islet cell transplantation in 2013. She has history of exocrine pancreatic  insufficiency, ongoing abdominal pain, gastric by-pass for obesity, iron deficiency, BLU, chronic pain syndrome, antibiotic associated diarrhea, E-coli bacteremia s/p exploratory  laparotomy with history of elevated LFTs and anxiety.            Review of System:   No fevers or chills  No weight loss  No blurry vision, double vision or change in vision  No Abdominal pain  No diarrhea   No Pain management   No sore throat  No lymphadenopathy  No headache, paraesthesias, or weakness in a limb  No shortness of breath or wheezing  No chest pain or pressure  No arthralgias or myalgias  No rashes or skin changes  No odynophagia or dysphagia  No BRBPR, hematochezia, melena  No dysuria, frequency or urgency  No hot/cold intolerance or polyria  No anxiety or depression    PHYSICAL EXAMINATION: :   Constitutional: aaox3, cooperative, pleasant, not dyspneic/diaphoretic, no acute distress  Vitals reviewed: [facility] 12/19/2013   Wt:   Wt Readings from Last 2 Encounters:   01/22/19 68 kg (150 lb)   01/17/19 67.7 kg (149 lb 3.2 oz)      Eyes: Sclera anicteric/injected  Ears/nose/mouth/throat: Normal oropharynx without ulcers or exudate, mucus membranes moist, hearing intact  Neck: supple, thyroid normal size  CV: No edema  Respiratory: Unlabored breathing  Lymph: No axillary, submandibular, supraclavicular or inguinal lymphadenopathy  Abd:  Nondistended, +bs, no hepatosplenomegaly, nontender, no peritoneal signs  Skin: warm, perfused, no jaundice  Psych: Normal affect  MSK: Normal ga      FAMILY HISTORY:  No colon/panc/esophageal/other GI CA, no IBD/celiac history, no know autoimmune disease     Social History:   Home situation: lives with  Occupation/Schooling:   Tobacco use:  Alcohol use: rare  Recreational Drug use: none                    Thank you for this consultation.  It was a pleasure to participate in the care of this patient; please contact us with any further questions.       Kelly Kevin APRN, CNP, CWOCN  Division of  Gastroenterology, Hepatology and Nutrition  [facility]    Pertinent Studies:       Office Visit on 01/22/2019   Component Date Value Ref Range Status     TSH 01/22/2019 1.23  0.40 - 4.00 mU/L Final     T4 Free 01/22/2019 0.90  0.76 - 1.46 ng/dL Final           PROBLEM LIST  Patient Active Problem List    Diagnosis Date Noted     Health Care Home 05/03/2011     Priority: High     EMERGENCY CARE PLAN  Presenting Problem Signs and Symptoms Treatment Plan    Questions or conerns during clinic hours    I will call the clinic directly     Questions or conerns outside clinic hours    I will call the 24 hour nurse line at [phone]    Patient needs to schedule an appointment    I will call the 24 hour scheduling team at [phone] or clinic directly    Same day treatment     I will call the clinic first, nurse line if after hours, urgent care and express care if needed                            DX V65.8 REPLACED WITH [id number] HEALTH CARE HOME (04/08/2013)       Antibiotic-associated diarrhea 08/28/2017     Priority: Medium     Elevated LFTs 08/28/2017     Priority: Medium     E coli bacteremia 08/25/2017     Priority: Medium     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     Priority: Medium     Acquired hypothyroidism 11/03/2016     Priority: Medium     Thyroid nodule 11/01/2016     Priority: Medium     Dysthymia 03/01/2016     Priority: Medium     Anxiety 03/01/2016     Priority: Medium     S/P exploratory laparotomy 12/29/2015     Priority: Medium     BLU (obstructive sleep apnea) 12/23/2015     Priority: Medium     Asplenia 10/24/2014     Priority: Medium     Exocrine pancreatic insufficiency 05/17/2013     Priority: Medium     Chronic pain syndrome 02/23/2013     Priority: Medium     Gastric bypass status for obesity 10/26/2010     Priority: Medium     Iron deficiency anemia secondary to inadequate dietary iron intake 12/28/2004     Priority: Medium     relates to gastric bypass         PERTINENT PAST  MEDICAL HISTORY:    Past Medical History:   Diagnosis Date     Abdominal pain, epigastric , ongoing; goes to pain clinic    probable recurrent spasm sphincter of Oddi causing biliary colic pains      Benign paroxysmal positional vertigo     occ.      Calculus of kidney 5/05    x1 on L side passed, several stones.  Has been tested for oxalate.     Chronic abdominal pain 2013     Chronic pancreatitis (H) 2013     Depressive disorder, not elsewhere classified     also occ panic spells     Diabetes (H)     post pancreatectomy     Dyspepsia and other specified disorders of function of stomach     H. pylori   treated     Headache(784.0)     still periodic HA's ;  often 5X/week     Hypertension 16    Stress related     Iron deficiency anemia secondary to inadequate dietary iron intake     relates to gastric bypass     Other chronic pain      Post-pancreatectomy diabetes (H) 2013     Pyelonephritis, unspecified , 10/8    L side     Regional enteritis of unspecified site 1987    inacive/remission     Sleep apnea     uses Cpap     Spasm of sphincter of Oddi     ERCP with Stent of CBD by Fernandez @ [facility] [place] with sx relief     Spasm of sphincter of Oddi     surgical + endoscopic stenting of pancreatic duct @ [facility] [place] 06     Thyroid nodule 2016     Ureteral calculus 10/2/2012     Vaccination not carried out        PREVIOUS SURGERIES:    Past Surgical History:   Procedure Laterality Date     APPENDECTOMY       C NONSPECIFIC PROCEDURE      R bunion     C NONSPECIFIC PROCEDURE      T & A     C NONSPECIFIC PROCEDURE      partial ileum resection     C NONSPECIFIC PROCEDURE      R ovarian cyst     C NONSPECIFIC PROCEDURE           C NONSPECIFIC PROCEDURE      GALL BLADDER     C NONSPECIFIC PROCEDURE      CBD stent; Dr. Fernandez MAY NONSPECIFIC PROCEDURE   &     colonoscopy     C NONSPECIFIC PROCEDURE      Gastric bypass      CHOLECYSTECTOMY       COLONOSCOPY       COMBINED CYSTOSCOPY, RETROGRADES, URETEROSCOPY, LASER HOLMIUM LITHOTRIPSY URETER(S), INSERT STENT Right 3/23/2015    Procedure: COMBINED CYSTOSCOPY, RETROGRADES, URETEROSCOPY, LASER HOLMIUM LITHOTRIPSY URETER(S), INSERT STENT;  Surgeon: Kennedi Aldana MD;  Location: [facility] OR     COMBINED CYSTOSCOPY, RETROGRADES, URETEROSCOPY, LASER HOLMIUM LITHOTRIPSY URETER(S), INSERT STENT Right 4/20/2015    Procedure: COMBINED CYSTOSCOPY, RETROGRADES, URETEROSCOPY, LASER HOLMIUM LITHOTRIPSY URETER(S), INSERT STENT;  Surgeon: Kennedi Aldana MD;  Location: [facility] OR     COSMETIC SURGERY  6/24/2002    Tummy tuck     CYSTOSCOPY, RETROGRADES, INSERT STENT URETER(S), COMBINED  10/2/2012    Procedure: COMBINED CYSTOSCOPY, RETROGRADES, INSERT STENT URETER(S);  COMBINED CYSTOSCOPY,  , INSERT LEFT STENT URETER;  Surgeon: Johny Baez MD;  Location: [facility] OR     ENT SURGERY       ESOPHAGOSCOPY, GASTROSCOPY, DUODENOSCOPY (EGD), COMBINED N/A 1/24/2018    Procedure: COMBINED ESOPHAGOSCOPY, GASTROSCOPY, DUODENOSCOPY (EGD);  ESOPHAGOSCOPY, GASTROSCOPY, DUODENOSCOPY (EGD)    ;  Surgeon: Tamir Rodgers MD;  Location:  GI     EXTRACORPOREAL SHOCK WAVE LITHOTRIPSY (ESWL)  10/16/2012    Procedure: EXTRACORPOREAL SHOCK WAVE LITHOTRIPSY (ESWL);  left EXTRACORPOREAL SHOCK WAVE LITHOTRIPSY (ESWL) ;  Surgeon: Johny Baez MD;  Location: [facility] OR     GI SURGERY  12/29/2015    Small bowel obstruction     HC LAP,LYSIS OF ADHESIONS       HERNIA REPAIR  2/2015     LAPAROSCOPIC LYSIS ADHESIONS N/A 2/20/2015    Procedure: LAPAROSCOPIC LYSIS ADHESIONS;  Surgeon: Aaron Early MD;  [facility]     LAPAROSCOPIC LYSIS ADHESIONS N/A 12/29/2015    Procedure: LAPAROSCOPIC LYSIS ADHESIONS;  Surgeon: Aaron Early MD;  [facility]: [facility] OR     PANCREATECTOMY, TRANSPLANT AUTO ISLET CELL, COMBINED  5/10/2013    Procedure: COMBINED PANCREATECTOMY, TRANSPLANT AUTO ISLET CELL;  Pancreatectomy, Auto Islet Cell  Transplant   hernia repair, jejunostomy tube and liver biopsies with Anesthesia General with block;  Surgeon: Aaron Early MD;  Location: [facility] OR     TRANSPLANT  5/10/13       PREVIOUS ENDOSCOPY:      ALLERGIES:     Allergies   Allergen Reactions     Povidone Iodine Hives     Causes skin to blister     Corticosteroids Other (See Comments)     All oral,IV and injectable steroids are contraindicated (unless in life threatening situations) in Islet Auto transplant recipients. They can cause irreversible loss of islet cell function. Please contact patients transplant care coordinator JONATHAN Carvalho RN at /pager   and Endocrinologist prior to administration.      Nsaids      naprosyn = GI upset     Povidone Iodine      blisters     Sulfasalazine Nausea and Nausea and Vomiting       PERTINENT MEDICATIONS:Medication Reviewed     Current Outpatient Medications:      ACETAMINOPHEN PO, Take 325 mg by mouth every 6 hours as needed for pain, Disp: , Rfl:      amylase-lipase-protease (VIOKACE) 65757 units TABS tablet, Take 4-5 caps with meals and 1-2 with snacks, Disp: 600 tablet, Rfl: 11     amylase-lipase-protease (VIOKACE) 87517 units TABS tablet, Take 4-5 caps with meals and 1-2 with snacks, Disp: 600 tablet, Rfl: 5     blood glucose (BILL MICROLET 2) lancing device, Use to test blood sugars 6 times daily or as directed., Disp: 1 each, Rfl: 11     blood glucose monitoring (BILL CONTOUR NEXT) test strip, Check BS 4-6 times a day, Disp: 2 Box, Rfl: 6     blood glucose monitoring (NO BRAND SPECIFIED) test strip, , Disp: , Rfl:      buprenorphine HCl-naloxone HCl (SUBOXONE) 2-0.5 MG per film, Place 0.5 Film under the tongue every morning Patient states she takes an 1/8 of a strip daily., Disp: , Rfl:      busPIRone (BUSPAR) 15 MG tablet, Increased dose. 30 mg two times per day. (Patient taking differently: 15 mg 2 times daily Increased dose. 30 mg two times per day.), Disp: 120 tablet, Rfl: 3      cefuroxime (CEFTIN) 500 MG tablet, Take 1 tablet (500 mg) by mouth 2 times daily, Disp: 20 tablet, Rfl: 0     ciprofloxacin (CIPRO) 500 MG tablet, Take 1 tablet (500 mg) by mouth 2 times daily for 10 days, Disp: 20 tablet, Rfl: 0     CLONIDINE HCL PO, Take 0.1 mg by mouth 2 times daily as needed, Disp: , Rfl:      Continuous Blood Gluc  (FREESTYLE BRE READER) MODESTA, 1 each 3 times daily (before meals) Use with bre sensor to check BG before meals and as needed, Disp: 1 Device, Rfl: 0     continuous blood glucose monitoring (FREESTYLE BRE) sensor, For use with Freestyle Bre Flash  for continuous monitioring of blood glucose levels. Replace sensor every 10 days., Disp: 3 each, Rfl: 11     DOCUSATE SODIUM PO, Take 100 mg by mouth daily, Disp: , Rfl:      DULoxetine (CYMBALTA) 30 MG EC capsule, Take 1 capsule (30 mg) by mouth 2 times daily Fill at patient request., Disp: 180 capsule, Rfl: 1     escitalopram (LEXAPRO) 10 MG tablet, , Disp: , Rfl: 2     estradiol (ESTRACE) 0.1 MG/GM vaginal cream, PLACE 2 GRAMS VAGINALLY TWICE A WEEK, Disp: 42.5 g, Rfl: 11     Estradiol-Norethindrone Acet 0.5-0.1 MG TABS, TAKE ONE TABLET BY MOUTH DAILY, Disp: 84 tablet, Rfl: 0     gabapentin (NEURONTIN) 300 MG capsule, , Disp: , Rfl:      glucose 40 % GEL, Take 15-30 g by mouth every 15 minutes as needed., Disp: 1 Tube, Rfl: 11     HydrOXYzine Pamoate (VISTARIL PO), Take 25 mg by mouth every 6 hours as needed for itching, Disp: , Rfl:      Injection Device for insulin (NOVOPEN JRHUNTER,) MODESTA, 1 Device Inject 1 unit (which is marked as a 1/2 unit on the pen itself)  as needed when eating carb heavy meals., Disp: , Rfl:      insulin aspart (NOVOLOG PENFILL) 100 UNITS/ML injection, Inject 0.5-2 units 4 times daily, Disp: 3 mL, Rfl: 3     insulin glargine (LANTUS SOLOSTAR) 100 UNIT/ML pen, Inject 5 Units Subcutaneous every evening, Disp: 15 mL, Rfl: 3     insulin pen needle (BD MAHESH U/F) 32G X 4 MM, Use up to 3 times  daily as needed for insulin dosing, Disp: 100 each, Rfl: prn     levothyroxine (SYNTHROID/LEVOTHROID) 88 MCG tablet, Take 1 tablet (88 mcg) by mouth daily, Disp: 90 tablet, Rfl: 3     loratadine (CLARITIN) 10 MG tablet, Take 10 mg by mouth daily as needed , Disp: , Rfl:      LORazepam (ATIVAN) 0.5 MG tablet, Take 1 tablet (0.5 mg) by mouth every 8 hours as needed for anxiety, Disp: 30 tablet, Rfl: 3     modafinil (PROVIGIL) 200 MG tablet, Take 2 tablets by mouth Once a Day, Disp: , Rfl:      naloxegol (MOVANTIK) 25 MG TABS tablet, Take 25 mg by mouth every morning (before breakfast), Disp: , Rfl:      naloxone (NARCAN) nasal spray, Spray 4 mg in nostril as needed for opioid reversal, Disp: , Rfl:      Nutritional Supplements (BOOST HIGH PROTEIN) LIQD, After above baseline labs are drawn, give: 6 mL/kg to maximum of 360 mL; the beverage is to be consumed within 5 minutes. (Patient not taking: Reported on 9/20/2018), Disp: , Rfl: 0     omeprazole (PRILOSEC) 40 MG DR capsule, Take 1 capsule (40 mg) by mouth 2 times daily Take 30-60 minutes before a meal., Disp: 180 capsule, Rfl: 2     ondansetron (ZOFRAN ODT) 4 MG ODT tab, Take 1-2 tablets (4-8 mg) by mouth 3 times daily (before meals), Disp: 20 tablet, Rfl: 3     polyethylene glycol (MIRALAX/GLYCOLAX) packet, Take 17 g by mouth daily, Disp: 7 packet, Rfl: 0     ranitidine (ZANTAC) 150 MG tablet, Take 1 tablet (150 mg) by mouth 2 times daily, Disp: 180 tablet, Rfl: 3     sennosides (SENOKOT) 8.6 MG tablet, Take 2 tablets by mouth 2 times daily, Disp: 120 tablet, Rfl: 1     Sharps Container MISC, For insulin needles, Disp: 1 each, Rfl: prn     traZODone (DESYREL) 50 MG tablet, Take 1 tablet (50 mg) by mouth At Bedtime (Patient not taking: Reported on 8/23/2018), Disp: 90 tablet, Rfl: 3    SOCIAL HISTORY:  Social History     Socioeconomic History     Marital status:      Spouse name: Tera     Number of children: 2     Years of education: Not on file     Highest  education level: Not on file   Social Needs     Financial resource strain: Not on file     Food insecurity - worry: Not on file     Food insecurity - inability: Not on file     Transportation needs - medical: Not on file     Transportation needs - non-medical: Not on file   Occupational History     Occupation: customer service     Employer: [employer]   Tobacco Use     Smoking status: Never Smoker     Smokeless tobacco: Never Used   Substance and Sexual Activity     Alcohol use: No     Alcohol/week: 0.0 oz     Drug use: No     Sexual activity: Yes     Partners: Male     Birth control/protection: None   Other Topics Concern     Parent/sibling w/ CABG, MI or angioplasty before 65F 55M? No   Social History Narrative     Not on file       FAMILY HISTORY:  FH of CRC:   FH of IBD: Crohn's disease mother   Family History   Problem Relation Age of Onset     Family History Negative Mother      Respiratory Father         COPD;  at 69     Genitourinary Problems Father         kidney stones     Substance Abuse Father      Depression Father      Asthma Father      Heart Disease Paternal Grandfather         M.I.     Coronary Artery Disease Paternal Grandfather      Hyperlipidemia Paternal Grandfather      Genitourinary Problems Brother         multiple brothers with kidney stones     Gastrointestinal Disease Maternal Grandmother         undiagnosed 'gut' issues     Coronary Artery Disease Maternal Grandfather      Hyperlipidemia Maternal Grandfather      Cerebrovascular Disease Paternal Grandmother         At the age of 103     Anxiety Disorder Paternal Grandmother      Osteoporosis Paternal Grandmother      Anxiety Disorder Son      Anxiety Disorder Daughter      Asthma Daughter        Past/family/social history reviewed and no changes    Answers for HPI/ROS submitted by the patient on 2019   General Symptoms: No  Skin Symptoms: No  HENT Symptoms: Yes  EYE SYMPTOMS: No  HEART SYMPTOMS: No  LUNG SYMPTOMS: No  INTESTINAL  SYMPTOMS: Yes  URINARY SYMPTOMS: No  GYNECOLOGIC SYMPTOMS: No  BREAST SYMPTOMS: No  SKELETAL SYMPTOMS: Yes  BLOOD SYMPTOMS: No  NERVOUS SYSTEM SYMPTOMS: No  MENTAL HEALTH SYMPTOMS: Yes  Ear pain: No  Ear discharge: No  Hearing loss: No  Tinnitus: No  Nosebleeds: No  Congestion: Yes  Sinus pain: Yes  Trouble swallowing: No   Voice hoarseness: No  Mouth sores: No  Sore throat: No  Tooth pain: No  Gum tenderness: No  Bleeding gums: No  Change in taste: No  Change in sense of smell: No  Dry mouth: Yes  Hearing aid used: No  Neck lump: No  Heart burn or indigestion: Yes  Nausea: Yes  Vomiting: Yes  Abdominal pain: Yes  Bloating: Yes  Constipation: No  Diarrhea: Yes  Blood in stool: No  Black stools: No  Rectal or Anal pain: Yes  Fecal incontinence: Yes  Yellowing of skin or eyes: No  Vomit with blood: No  Change in stools: No  Back pain: No  Muscle aches: Yes  Neck pain: Yes  Swollen joints: Yes  Joint pain: Yes  Bone pain: No  Muscle cramps: Yes  Muscle weakness: Yes  Joint stiffness: Yes  Bone fracture: No  Nervous or Anxious: Yes  Depression: Yes  Trouble sleeping: Yes  Trouble thinking or concentrating: Yes  Mood changes: Yes  Panic attacks: Yes

## 2019-02-13 ENCOUNTER — OFFICE VISIT (OUTPATIENT)
Dept: GASTROENTEROLOGY | Facility: CLINIC | Age: 54
End: 2019-02-13
Payer: COMMERCIAL

## 2019-02-13 VITALS
SYSTOLIC BLOOD PRESSURE: 127 MMHG | TEMPERATURE: 98.5 F | OXYGEN SATURATION: 97 % | DIASTOLIC BLOOD PRESSURE: 77 MMHG | HEART RATE: 93 BPM | BODY MASS INDEX: 25.59 KG/M2 | WEIGHT: 149.9 LBS | HEIGHT: 64 IN

## 2019-02-13 DIAGNOSIS — K90.9 DIARRHEA DUE TO MALABSORPTION: Primary | ICD-10-CM

## 2019-02-13 DIAGNOSIS — R19.7 DIARRHEA DUE TO MALABSORPTION: Primary | ICD-10-CM

## 2019-02-13 ASSESSMENT — MIFFLIN-ST. JEOR: SCORE: 1254.94

## 2019-02-13 ASSESSMENT — PAIN SCALES - GENERAL: PAINLEVEL: MODERATE PAIN (4)

## 2019-02-13 NOTE — LETTER
2/13/2019      RE: Lynn Thompson  [address] [facility] [id number]           Kirill Santillan    Advanced Endoscopy/Pancreaticobiliary Consultation      Pt known to me from pre TPIAT care. TPIAT in 2013. Now main issues are gut related- diarrhea, loose greasy stools. Is on senna and miralax for unclear reasons. Susposedly because of excess stool on CT. ALso PSBO several times, post lesion lysis x 2 by lap in past.  Rest of details below. Minimal insulin requirment.     IMP: 60 minutes more than 50% counseling. Since main problem is diarrhea, not responsive to various manipulations of panc enzymes, and not responsive to course of cipro for SIBO, suspect it may be medication induced. ALSO may be related to wean off many years of narcotic analgesics,, and perhaps due to siboxone.     Rec wean down senna and miralax. Add fiber in form of metamucil or citrucel.   ALSO: recommend start ACUPUNCTURE which may be useful adjunct to motility disorders. Perhaps dr Brooks can recommend?  Follow-up in one month w me and dietitian Shadia Fisher.       Seen and examined with APRN,  agree with findings and recommendations.    Kirill Presley MD GI Staff        CC/REFERRING MD:  Maryana Brooks  REASON FOR CONSULTATION: Abdominal Pain, diarrhea & Post Pancreatectomy islet autotransplant.      Assessment /Plan:   Lynn Thompson is 54-year old woman chronic pancreatitis s/p pancreatectomy Islet cell transplantation in 2013. She has history of exocrine pancreatic insufficiency, ongoing abdominal pain, gastric by-pass for obesity, iron deficiency, BLU, chronic pain syndrome, antibiotic associated diarrhea, E-coli bacteremia s/p exploratory  laparotomy with history of elevated LFTs and anxiety.  Abdominal pian: Patient has had ongoing abdominal pain for quit some time and uses suboxone         2.     --  --  --      RTC 3 months                HISTORY OF PRESENT ILLNESS:      Lynn Thompson is 54-year old woman chronic  pancreatitis s/p pancreatectomy Islet cell transplantation in 2013. She has history of exocrine pancreatic insufficiency, ongoing abdominal pain, gastric by-pass for obesity, iron deficiency, BLU, chronic pain syndrome, antibiotic associated diarrhea, E-coli bacteremia s/p exploratory  laparotomy with history of elevated LFTs and anxiety.            Review of System:   No fevers or chills  No weight loss  No blurry vision, double vision or change in vision  No Abdominal pain  No diarrhea   No Pain management   No sore throat  No lymphadenopathy  No headache, paraesthesias, or weakness in a limb  No shortness of breath or wheezing  No chest pain or pressure  No arthralgias or myalgias  No rashes or skin changes  No odynophagia or dysphagia  No BRBPR, hematochezia, melena  No dysuria, frequency or urgency  No hot/cold intolerance or polyria  No anxiety or depression    PHYSICAL EXAMINATION: :   Constitutional: aaox3, cooperative, pleasant, not dyspneic/diaphoretic, no acute distress  Vitals reviewed: LMP 12/19/2013   Wt:   Wt Readings from Last 2 Encounters:   01/22/19 68 kg (150 lb)   01/17/19 67.7 kg (149 lb 3.2 oz)      Eyes: Sclera anicteric/injected  Ears/nose/mouth/throat: Normal oropharynx without ulcers or exudate, mucus membranes moist, hearing intact  Neck: supple, thyroid normal size  CV: No edema  Respiratory: Unlabored breathing  Lymph: No axillary, submandibular, supraclavicular or inguinal lymphadenopathy  Abd:  Nondistended, +bs, no hepatosplenomegaly, nontender, no peritoneal signs  Skin: warm, perfused, no jaundice  Psych: Normal affect  MSK: Normal ga      FAMILY HISTORY:  No colon/panc/esophageal/other GI CA, no IBD/celiac history, no know autoimmune disease     Social History:   Home situation: lives with  Occupation/Schooling:   Tobacco use:  Alcohol use: rare  Recreational Drug use: none                    Thank you for this consultation.  It was a pleasure to participate in the care of this  patient; please contact us with any further questions.       Kelly Kevin APRN, CNP, CWOCN  Division of Gastroenterology, Hepatology and Nutrition  [facility]    Pertinent Studies:       Office Visit on 01/22/2019   Component Date Value Ref Range Status     TSH 01/22/2019 1.23  0.40 - 4.00 mU/L Final     T4 Free 01/22/2019 0.90  0.76 - 1.46 ng/dL Final           PROBLEM LIST  Patient Active Problem List    Diagnosis Date Noted     Health Care Home 05/03/2011     Priority: High     EMERGENCY CARE PLAN  Presenting Problem Signs and Symptoms Treatment Plan    Questions or conerns during clinic hours    I will call the clinic directly     Questions or conerns outside clinic hours    I will call the 24 hour nurse line at [phone]    Patient needs to schedule an appointment    I will call the 24 hour scheduling team at [phone] or clinic directly    Same day treatment     I will call the clinic first, nurse line if after hours, urgent care and express care if needed                            DX V65.8 REPLACED WITH [id number] HEALTH CARE HOME (04/08/2013)       Antibiotic-associated diarrhea 08/28/2017     Priority: Medium     Elevated LFTs 08/28/2017     Priority: Medium     E coli bacteremia 08/25/2017     Priority: Medium     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     Priority: Medium     Acquired hypothyroidism 11/03/2016     Priority: Medium     Thyroid nodule 11/01/2016     Priority: Medium     Dysthymia 03/01/2016     Priority: Medium     Anxiety 03/01/2016     Priority: Medium     S/P exploratory laparotomy 12/29/2015     Priority: Medium     BLU (obstructive sleep apnea) 12/23/2015     Priority: Medium     Asplenia 10/24/2014     Priority: Medium     Exocrine pancreatic insufficiency 05/17/2013     Priority: Medium     Chronic pain syndrome 02/23/2013     Priority: Medium     Gastric bypass status for obesity 10/26/2010     Priority: Medium     Iron deficiency anemia secondary to inadequate  dietary iron intake 2004     Priority: Medium     relates to gastric bypass         PERTINENT PAST MEDICAL HISTORY:    Past Medical History:   Diagnosis Date     Abdominal pain, epigastric , ongoing; goes to pain clinic    probable recurrent spasm sphincter of Oddi causing biliary colic pains      Benign paroxysmal positional vertigo     occ.      Calculus of kidney 5/05    x1 on L side passed, several stones.  Has been tested for oxalate.     Chronic abdominal pain 2013     Chronic pancreatitis (H) 2013     Depressive disorder, not elsewhere classified     also occ panic spells     Diabetes (H)     post pancreatectomy     Dyspepsia and other specified disorders of function of stomach     H. pylori   treated     Headache(784.0)     still periodic HA's ;  often 5X/week     Hypertension 14    Stress related     Iron deficiency anemia secondary to inadequate dietary iron intake     relates to gastric bypass     Other chronic pain      Post-pancreatectomy diabetes (H) 2013     Pyelonephritis, unspecified , 10/8    L side     Regional enteritis of unspecified site 1987    inacive/remission     Sleep apnea     uses Cpap     Spasm of sphincter of Oddi     ERCP with Stent of CBD by Fernandez [place] [facility] with sx relief     Spasm of sphincter of Oddi     surgical + endoscopic stenting of pancreatic duct @ [facility] 06     Thyroid nodule 2016     Ureteral calculus 10/2/2012     Vaccination not carried out        PREVIOUS SURGERIES:    Past Surgical History:   Procedure Laterality Date     APPENDECTOMY       C NONSPECIFIC PROCEDURE      R bunion     C NONSPECIFIC PROCEDURE      T & A     C NONSPECIFIC PROCEDURE      partial ileum resection     C NONSPECIFIC PROCEDURE      R ovarian cyst     C NONSPECIFIC PROCEDURE           C NONSPECIFIC PROCEDURE      GALL BLADDER     C NONSPECIFIC PROCEDURE      CBD stent; Dr. Presley     C NONSPECIFIC  PROCEDURE  6/02 & 12/05    colonoscopy     C NONSPECIFIC PROCEDURE  4/03    Gastric bypass     CHOLECYSTECTOMY       COLONOSCOPY       COMBINED CYSTOSCOPY, RETROGRADES, URETEROSCOPY, LASER HOLMIUM LITHOTRIPSY URETER(S), INSERT STENT Right 3/23/2015    Procedure: COMBINED CYSTOSCOPY, RETROGRADES, URETEROSCOPY, LASER HOLMIUM LITHOTRIPSY URETER(S), INSERT STENT;  Surgeon: Kennedi Aldana MD;  Location: [facility] OR     COMBINED CYSTOSCOPY, RETROGRADES, URETEROSCOPY, LASER HOLMIUM LITHOTRIPSY URETER(S), INSERT STENT Right 4/20/2015    Procedure: COMBINED CYSTOSCOPY, RETROGRADES, URETEROSCOPY, LASER HOLMIUM LITHOTRIPSY URETER(S), INSERT STENT;  Surgeon: Kennedi Aldana MD;  Location: [facility] OR     COSMETIC SURGERY  6/24/2002    Tummy sherrieck     CYSTOSCOPY, RETROGRADES, INSERT STENT URETER(S), COMBINED  10/2/2012    Procedure: COMBINED CYSTOSCOPY, RETROGRADES, INSERT STENT URETER(S);  COMBINED CYSTOSCOPY,  , INSERT LEFT STENT URETER;  Surgeon: Johny Baez MD;  Location: [facility] OR     ENT SURGERY       ESOPHAGOSCOPY, GASTROSCOPY, DUODENOSCOPY (EGD), COMBINED N/A 1/24/2018    Procedure: COMBINED ESOPHAGOSCOPY, GASTROSCOPY, DUODENOSCOPY (EGD);  ESOPHAGOSCOPY, GASTROSCOPY, DUODENOSCOPY (EGD)    ;  Surgeon: Tamir Rodgers MD;  Location:  GI     EXTRACORPOREAL SHOCK WAVE LITHOTRIPSY (ESWL)  10/16/2012    Procedure: EXTRACORPOREAL SHOCK WAVE LITHOTRIPSY (ESWL);  left EXTRACORPOREAL SHOCK WAVE LITHOTRIPSY (ESWL) ;  Surgeon: Johny Baez MD;  Location: [facility] OR     GI SURGERY  12/29/2015    Small bowel obstruction     HC LAP,LYSIS OF ADHESIONS       HERNIA REPAIR  2/2015     LAPAROSCOPIC LYSIS ADHESIONS N/A 2/20/2015    Procedure: LAPAROSCOPIC LYSIS ADHESIONS;  Surgeon: Aaron Early MD;  [facility]: [facility]     LAPAROSCOPIC LYSIS ADHESIONS N/A 12/29/2015    Procedure: LAPAROSCOPIC LYSIS ADHESIONS;  Surgeon: Aaron Early MD;  Location: [facility] OR     PANCREATECTOMY, TRANSPLANT AUTO ISLET CELL, COMBINED  5/10/2013     Procedure: COMBINED PANCREATECTOMY, TRANSPLANT AUTO ISLET CELL;  Pancreatectomy, Auto Islet Cell Transplant   hernia repair, jejunostomy tube and liver biopsies with Anesthesia General with block;  Surgeon: Aaron Early MD;  Location: [facility] OR     TRANSPLANT  5/10/13       PREVIOUS ENDOSCOPY:      ALLERGIES:     Allergies   Allergen Reactions     Povidone Iodine Hives     Causes skin to blister     Corticosteroids Other (See Comments)     All oral,IV and injectable steroids are contraindicated (unless in life threatening situations) in Islet Auto transplant recipients. They can cause irreversible loss of islet cell function. Please contact patients transplant care coordinator JONATHAN Carvalho RN at /pager   and Endocrinologist prior to administration.      Nsaids      naprosyn = GI upset     Povidone Iodine      blisters     Sulfasalazine Nausea and Nausea and Vomiting       PERTINENT MEDICATIONS:Medication Reviewed     Current Outpatient Medications:      ACETAMINOPHEN PO, Take 325 mg by mouth every 6 hours as needed for pain, Disp: , Rfl:      amylase-lipase-protease (VIOKACE) 85254 units TABS tablet, Take 4-5 caps with meals and 1-2 with snacks, Disp: 600 tablet, Rfl: 11     amylase-lipase-protease (VIOKACE) 48318 units TABS tablet, Take 4-5 caps with meals and 1-2 with snacks, Disp: 600 tablet, Rfl: 5     blood glucose (BILL MICROLET 2) lancing device, Use to test blood sugars 6 times daily or as directed., Disp: 1 each, Rfl: 11     blood glucose monitoring (BILL CONTOUR NEXT) test strip, Check BS 4-6 times a day, Disp: 2 Box, Rfl: 6     blood glucose monitoring (NO BRAND SPECIFIED) test strip, , Disp: , Rfl:      buprenorphine HCl-naloxone HCl (SUBOXONE) 2-0.5 MG per film, Place 0.5 Film under the tongue every morning Patient states she takes an 1/8 of a strip daily., Disp: , Rfl:      busPIRone (BUSPAR) 15 MG tablet, Increased dose. 30 mg two times per day. (Patient taking differently:  15 mg 2 times daily Increased dose. 30 mg two times per day.), Disp: 120 tablet, Rfl: 3     cefuroxime (CEFTIN) 500 MG tablet, Take 1 tablet (500 mg) by mouth 2 times daily, Disp: 20 tablet, Rfl: 0     ciprofloxacin (CIPRO) 500 MG tablet, Take 1 tablet (500 mg) by mouth 2 times daily for 10 days, Disp: 20 tablet, Rfl: 0     CLONIDINE HCL PO, Take 0.1 mg by mouth 2 times daily as needed, Disp: , Rfl:      Continuous Blood Gluc  (FREESTYLE BRE READER) MODESTA, 1 each 3 times daily (before meals) Use with bre sensor to check BG before meals and as needed, Disp: 1 Device, Rfl: 0     continuous blood glucose monitoring (FREESTYLE BRE) sensor, For use with Freestyle Bre Flash  for continuous monitioring of blood glucose levels. Replace sensor every 10 days., Disp: 3 each, Rfl: 11     DOCUSATE SODIUM PO, Take 100 mg by mouth daily, Disp: , Rfl:      DULoxetine (CYMBALTA) 30 MG EC capsule, Take 1 capsule (30 mg) by mouth 2 times daily Fill at patient request., Disp: 180 capsule, Rfl: 1     escitalopram (LEXAPRO) 10 MG tablet, , Disp: , Rfl: 2     estradiol (ESTRACE) 0.1 MG/GM vaginal cream, PLACE 2 GRAMS VAGINALLY TWICE A WEEK, Disp: 42.5 g, Rfl: 11     Estradiol-Norethindrone Acet 0.5-0.1 MG TABS, TAKE ONE TABLET BY MOUTH DAILY, Disp: 84 tablet, Rfl: 0     gabapentin (NEURONTIN) 300 MG capsule, , Disp: , Rfl:      glucose 40 % GEL, Take 15-30 g by mouth every 15 minutes as needed., Disp: 1 Tube, Rfl: 11     HydrOXYzine Pamoate (VISTARIL PO), Take 25 mg by mouth every 6 hours as needed for itching, Disp: , Rfl:      Injection Device for insulin (NOVOPEN HUNTER CARBAJAL,) MODESTA, 1 Device Inject 1 unit (which is marked as a 1/2 unit on the pen itself)  as needed when eating carb heavy meals., Disp: , Rfl:      insulin aspart (NOVOLOG PENFILL) 100 UNITS/ML injection, Inject 0.5-2 units 4 times daily, Disp: 3 mL, Rfl: 3     insulin glargine (LANTUS SOLOSTAR) 100 UNIT/ML pen, Inject 5 Units Subcutaneous every  evening, Disp: 15 mL, Rfl: 3     insulin pen needle (BD MAHESH U/F) 32G X 4 MM, Use up to 3 times daily as needed for insulin dosing, Disp: 100 each, Rfl: prn     levothyroxine (SYNTHROID/LEVOTHROID) 88 MCG tablet, Take 1 tablet (88 mcg) by mouth daily, Disp: 90 tablet, Rfl: 3     loratadine (CLARITIN) 10 MG tablet, Take 10 mg by mouth daily as needed , Disp: , Rfl:      LORazepam (ATIVAN) 0.5 MG tablet, Take 1 tablet (0.5 mg) by mouth every 8 hours as needed for anxiety, Disp: 30 tablet, Rfl: 3     modafinil (PROVIGIL) 200 MG tablet, Take 2 tablets by mouth Once a Day, Disp: , Rfl:      naloxegol (MOVANTIK) 25 MG TABS tablet, Take 25 mg by mouth every morning (before breakfast), Disp: , Rfl:      naloxone (NARCAN) nasal spray, Spray 4 mg in nostril as needed for opioid reversal, Disp: , Rfl:      Nutritional Supplements (BOOST HIGH PROTEIN) LIQD, After above baseline labs are drawn, give: 6 mL/kg to maximum of 360 mL; the beverage is to be consumed within 5 minutes. (Patient not taking: Reported on 9/20/2018), Disp: , Rfl: 0     omeprazole (PRILOSEC) 40 MG DR capsule, Take 1 capsule (40 mg) by mouth 2 times daily Take 30-60 minutes before a meal., Disp: 180 capsule, Rfl: 2     ondansetron (ZOFRAN ODT) 4 MG ODT tab, Take 1-2 tablets (4-8 mg) by mouth 3 times daily (before meals), Disp: 20 tablet, Rfl: 3     polyethylene glycol (MIRALAX/GLYCOLAX) packet, Take 17 g by mouth daily, Disp: 7 packet, Rfl: 0     ranitidine (ZANTAC) 150 MG tablet, Take 1 tablet (150 mg) by mouth 2 times daily, Disp: 180 tablet, Rfl: 3     sennosides (SENOKOT) 8.6 MG tablet, Take 2 tablets by mouth 2 times daily, Disp: 120 tablet, Rfl: 1     Sharps Container MISC, For insulin needles, Disp: 1 each, Rfl: prn     traZODone (DESYREL) 50 MG tablet, Take 1 tablet (50 mg) by mouth At Bedtime (Patient not taking: Reported on 8/23/2018), Disp: 90 tablet, Rfl: 3    SOCIAL HISTORY:  Social History     Socioeconomic History     Marital status:       Spouse name: Tera     Number of children: 2     Years of education: Not on file     Highest education level: Not on file   Social Needs     Financial resource strain: Not on file     Food insecurity - worry: Not on file     Food insecurity - inability: Not on file     Transportation needs - medical: Not on file     Transportation needs - non-medical: Not on file   Occupational History     Occupation: customer service     Employer: [employer]   Tobacco Use     Smoking status: Never Smoker     Smokeless tobacco: Never Used   Substance and Sexual Activity     Alcohol use: No     Alcohol/week: 0.0 oz     Drug use: No     Sexual activity: Yes     Partners: Male     Birth control/protection: None   Other Topics Concern     Parent/sibling w/ CABG, MI or angioplasty before 65F 55M? No   Social History Narrative     Not on file       FAMILY HISTORY:  FH of CRC:   FH of IBD: Crohn's disease mother   Family History   Problem Relation Age of Onset     Family History Negative Mother      Respiratory Father         COPD;  at 69     Genitourinary Problems Father         kidney stones     Substance Abuse Father      Depression Father      Asthma Father      Heart Disease Paternal Grandfather         M.I.     Coronary Artery Disease Paternal Grandfather      Hyperlipidemia Paternal Grandfather      Genitourinary Problems Brother         multiple brothers with kidney stones     Gastrointestinal Disease Maternal Grandmother         undiagnosed 'gut' issues     Coronary Artery Disease Maternal Grandfather      Hyperlipidemia Maternal Grandfather      Cerebrovascular Disease Paternal Grandmother         At the age of 103     Anxiety Disorder Paternal Grandmother      Osteoporosis Paternal Grandmother      Anxiety Disorder Son      Anxiety Disorder Daughter      Asthma Daughter        Past/family/social history reviewed and no changes    MD Fernandez

## 2019-02-13 NOTE — LETTER
2/13/2019       RE: Lynn Thompson  [address] [facility] [address]     Dear Colleague,    Thank you for referring your patient, Lynn Thompson, to the [facility] at [facility]. Please see a copy of my visit note below.        Again, shannan    Advanced Endoscopy/Pancreaticobiliary Consultation      Pt known to me from pre TPIAT care. TPIAT in 2013. Now main issues are gut related- diarrhea, loose greasy stools. Is on senna and miralax for unclear reasons. Susposedly because of excess stool on CT. ALso PSBO several times, post lesion lysis x 2 by lap in past.  Rest of details below. Minimal insulin requirment.     IMP: 60 minutes more than 50% counseling. Since main problem is diarrhea, not responsive to various manipulations of panc enzymes, and not responsive to course of cipro for SIBO, suspect it may be medication induced. ALSO may be related to wean off many years of narcotic analgesics,, and perhaps due to siboxone.     Rec wean down senna and miralax. Add fiber in form of metamucil or citrucel.   ALSO: recommend start ACUPUNCTURE which may be useful adjunct to motility disorders. Perhaps dr Brooks can recommend?  Follow-up in one month w me and dietitian Shadia Fisher.       Seen and examined with APRN,  agree with findings and recommendations.    Kirill Presley MD GI Staff        CC/REFERRING MD:  Maryana Brooks  REASON FOR CONSULTATION: Abdominal Pain, diarrhea & Post Pancreatectomy islet autotransplant.      Assessment /Plan:   Lynn Thompson is 54-year old woman chronic pancreatitis s/p pancreatectomy Islet cell transplantation in 2013. She has history of exocrine pancreatic insufficiency, ongoing abdominal pain, gastric by-pass for obesity, iron deficiency, BLU, chronic pain syndrome, antibiotic associated diarrhea, E-coli bacteremia s/p exploratory  laparotomy with history of elevated LFTs and anxiety.  Abdominal pian: Patient  has had ongoing abdominal pain for quit some time and uses suboxone         2.     --  --  --      RTC 3 months                HISTORY OF PRESENT ILLNESS:      Lynn Thompson is 54-year old woman chronic pancreatitis s/p pancreatectomy Islet cell transplantation in 2013. She has history of exocrine pancreatic insufficiency, ongoing abdominal pain, gastric by-pass for obesity, iron deficiency, BLU, chronic pain syndrome, antibiotic associated diarrhea, E-coli bacteremia s/p exploratory  laparotomy with history of elevated LFTs and anxiety.            Review of System:   No fevers or chills  No weight loss  No blurry vision, double vision or change in vision  No Abdominal pain  No diarrhea   No Pain management   No sore throat  No lymphadenopathy  No headache, paraesthesias, or weakness in a limb  No shortness of breath or wheezing  No chest pain or pressure  No arthralgias or myalgias  No rashes or skin changes  No odynophagia or dysphagia  No BRBPR, hematochezia, melena  No dysuria, frequency or urgency  No hot/cold intolerance or polyria  No anxiety or depression    PHYSICAL EXAMINATION: :   Constitutional: aaox3, cooperative, pleasant, not dyspneic/diaphoretic, no acute distress  Vitals reviewed: LMP 12/19/2013   Wt:   Wt Readings from Last 2 Encounters:   01/22/19 68 kg (150 lb)   01/17/19 67.7 kg (149 lb 3.2 oz)      Eyes: Sclera anicteric/injected  Ears/nose/mouth/throat: Normal oropharynx without ulcers or exudate, mucus membranes moist, hearing intact  Neck: supple, thyroid normal size  CV: No edema  Respiratory: Unlabored breathing  Lymph: No axillary, submandibular, supraclavicular or inguinal lymphadenopathy  Abd:  Nondistended, +bs, no hepatosplenomegaly, nontender, no peritoneal signs  Skin: warm, perfused, no jaundice  Psych: Normal affect  MSK: Normal ga      FAMILY HISTORY:  No colon/panc/esophageal/other GI CA, no IBD/celiac history, no know autoimmune disease     Social History:   Home situation:  lives with  Occupation/Schooling:   Tobacco use:  Alcohol use: rare  Recreational Drug use: none                    Thank you for this consultation.  It was a pleasure to participate in the care of this patient; please contact us with any further questions.       Kelly Kevin APRN, CNP, CWOCN  Division of Gastroenterology, Hepatology and Nutrition  [facility]    Pertinent Studies:       Office Visit on 01/22/2019   Component Date Value Ref Range Status     TSH 01/22/2019 1.23  0.40 - 4.00 mU/L Final     T4 Free 01/22/2019 0.90  0.76 - 1.46 ng/dL Final           PROBLEM LIST  Patient Active Problem List    Diagnosis Date Noted     Health Care Home 05/03/2011     Priority: High     EMERGENCY CARE PLAN  Presenting Problem Signs and Symptoms Treatment Plan    Questions or conerns during clinic hours    I will call the clinic directly     Questions or conerns outside clinic hours    I will call the 24 hour nurse line at [phone]    Patient needs to schedule an appointment    I will call the 24 hour scheduling team at [phone] or clinic directly    Same day treatment     I will call the clinic first, nurse line if after hours, urgent care and express care if needed                            DX V65.8 REPLACED WITH [id number] HEALTH CARE HOME (04/08/2013)       Antibiotic-associated diarrhea 08/28/2017     Priority: Medium     Elevated LFTs 08/28/2017     Priority: Medium     E coli bacteremia 08/25/2017     Priority: Medium     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     Priority: Medium     Acquired hypothyroidism 11/03/2016     Priority: Medium     Thyroid nodule 11/01/2016     Priority: Medium     Dysthymia 03/01/2016     Priority: Medium     Anxiety 03/01/2016     Priority: Medium     S/P exploratory laparotomy 12/29/2015     Priority: Medium     BLU (obstructive sleep apnea) 12/23/2015     Priority: Medium     Asplenia 10/24/2014     Priority: Medium     Exocrine pancreatic insufficiency 05/17/2013      Priority: Medium     Chronic pain syndrome 02/23/2013     Priority: Medium     Gastric bypass status for obesity 10/26/2010     Priority: Medium     Iron deficiency anemia secondary to inadequate dietary iron intake 12/28/2004     Priority: Medium     relates to gastric bypass         PERTINENT PAST MEDICAL HISTORY:    Past Medical History:   Diagnosis Date     Abdominal pain, epigastric 11/05, ongoing; goes to pain clinic    probable recurrent spasm sphincter of Oddi causing biliary colic pains      Benign paroxysmal positional vertigo     occ.      Calculus of kidney 5/05    x1 on L side passed, several stones.  Has been tested for oxalate.     Chronic abdominal pain 7/17/2013     Chronic pancreatitis (H) 7/17/2013     Depressive disorder, not elsewhere classified     also occ panic spells     Diabetes (H)     post pancreatectomy     Dyspepsia and other specified disorders of function of stomach 6/99    H. 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    ERCP with Stent of CBD by Fernandez @ [facility] with sx relief     Spasm of sphincter of Oddi     surgical + endoscopic stenting of pancreatic duct @ [facility] 5/23/06     Thyroid nodule 11/1/2016     Ureteral calculus 10/2/2012     Vaccination not carried out        PREVIOUS SURGERIES:    Past Surgical History:   Procedure Laterality Date     APPENDECTOMY  1990     C NONSPECIFIC PROCEDURE  12/98    R bunion     C NONSPECIFIC PROCEDURE  1997    T & A     C NONSPECIFIC PROCEDURE  1992    partial ileum resection     C NONSPECIFIC PROCEDURE       R ovarian cyst     C NONSPECIFIC PROCEDURE           C NONSPECIFIC PROCEDURE      GALL BLADDER     C NONSPECIFIC PROCEDURE      CBD stent; Dr. Presley     C NONSPECIFIC PROCEDURE   &     colonoscopy     C NONSPECIFIC PROCEDURE      Gastric bypass     CHOLECYSTECTOMY       COLONOSCOPY       COMBINED CYSTOSCOPY, RETROGRADES, URETEROSCOPY, LASER HOLMIUM LITHOTRIPSY URETER(S), INSERT STENT Right 3/23/2015    Procedure: COMBINED CYSTOSCOPY, RETROGRADES, URETEROSCOPY, LASER HOLMIUM LITHOTRIPSY URETER(S), INSERT STENT;  Surgeon: Kennedi Aldana MD;  Location: [facility] OR     COMBINED CYSTOSCOPY, RETROGRADES, URETEROSCOPY, LASER HOLMIUM LITHOTRIPSY URETER(S), INSERT STENT Right 2015    Procedure: COMBINED CYSTOSCOPY, RETROGRADES, URETEROSCOPY, LASER HOLMIUM LITHOTRIPSY URETER(S), INSERT STENT;  Surgeon: Kennedi Aldana MD;  Location: [facility] OR     COSMETIC SURGERY  2002    Tummy tuck     CYSTOSCOPY, RETROGRADES, INSERT STENT URETER(S), COMBINED  10/2/2012    Procedure: COMBINED CYSTOSCOPY, RETROGRADES, INSERT STENT URETER(S);  COMBINED CYSTOSCOPY,  , INSERT LEFT STENT URETER;  Surgeon: Johny Baez MD;  Location: [facility] OR     ENT SURGERY       ESOPHAGOSCOPY, GASTROSCOPY, DUODENOSCOPY (EGD), COMBINED N/A 2018    Procedure: COMBINED ESOPHAGOSCOPY, GASTROSCOPY, DUODENOSCOPY (EGD);  ESOPHAGOSCOPY, GASTROSCOPY, DUODENOSCOPY (EGD)    ;  Surgeon: Tamir Rodgers MD;  Location:  GI     EXTRACORPOREAL SHOCK WAVE LITHOTRIPSY (ESWL)  10/16/2012    Procedure: EXTRACORPOREAL SHOCK WAVE LITHOTRIPSY (ESWL);  left EXTRACORPOREAL SHOCK WAVE LITHOTRIPSY (ESWL) ;  Surgeon: Johny Baez MD;  Location: [facility] OR     GI SURGERY  2015    Small bowel obstruction     HC LAP,LYSIS OF ADHESIONS       HERNIA REPAIR  2015     LAPAROSCOPIC LYSIS ADHESIONS N/A 2015    Procedure: LAPAROSCOPIC LYSIS ADHESIONS;  Surgeon: Aaron Early MD;  [facility]      LAPAROSCOPIC LYSIS ADHESIONS N/A 12/29/2015    Procedure: LAPAROSCOPIC LYSIS ADHESIONS;  Surgeon: Aaron Early MD;  [facility]     PANCREATECTOMY, TRANSPLANT AUTO ISLET CELL, COMBINED  5/10/2013    Procedure: COMBINED PANCREATECTOMY, TRANSPLANT AUTO ISLET CELL;  Pancreatectomy, Auto Islet Cell Transplant   hernia repair, jejunostomy tube and liver biopsies with Anesthesia General with block;  Surgeon: Aaron Early MD;  [facility] OR     TRANSPLANT  5/10/13       PREVIOUS ENDOSCOPY:      ALLERGIES:     Allergies   Allergen Reactions     Povidone Iodine Hives     Causes skin to blister     Corticosteroids Other (See Comments)     All oral,IV and injectable steroids are contraindicated (unless in life threatening situations) in Islet Auto transplant recipients. They can cause irreversible loss of islet cell function. Please contact patients transplant care coordinator JONATHAN Carvalho RN at /pager   and Endocrinologist prior to administration.      Nsaids      naprosyn = GI upset     Povidone Iodine      blisters     Sulfasalazine Nausea and Nausea and Vomiting       PERTINENT MEDICATIONS:Medication Reviewed     Current Outpatient Medications:      ACETAMINOPHEN PO, Take 325 mg by mouth every 6 hours as needed for pain, Disp: , Rfl:      amylase-lipase-protease (VIOKACE) 25707 units TABS tablet, Take 4-5 caps with meals and 1-2 with snacks, Disp: 600 tablet, Rfl: 11     amylase-lipase-protease (VIOKACE) 45304 units TABS tablet, Take 4-5 caps with meals and 1-2 with snacks, Disp: 600 tablet, Rfl: 5     blood glucose (BILL MICROLET 2) lancing device, Use to test blood sugars 6 times daily or as directed., Disp: 1 each, Rfl: 11     blood glucose monitoring (BILL CONTOUR NEXT) test strip, Check BS 4-6 times a day, Disp: 2 Box, Rfl: 6     blood glucose monitoring (NO BRAND SPECIFIED) test strip, , Disp: , Rfl:      buprenorphine HCl-naloxone HCl (SUBOXONE) 2-0.5 MG per film, Place 0.5  Film under the tongue every morning Patient states she takes an 1/8 of a strip daily., Disp: , Rfl:      busPIRone (BUSPAR) 15 MG tablet, Increased dose. 30 mg two times per day. (Patient taking differently: 15 mg 2 times daily Increased dose. 30 mg two times per day.), Disp: 120 tablet, Rfl: 3     cefuroxime (CEFTIN) 500 MG tablet, Take 1 tablet (500 mg) by mouth 2 times daily, Disp: 20 tablet, Rfl: 0     ciprofloxacin (CIPRO) 500 MG tablet, Take 1 tablet (500 mg) by mouth 2 times daily for 10 days, Disp: 20 tablet, Rfl: 0     CLONIDINE HCL PO, Take 0.1 mg by mouth 2 times daily as needed, Disp: , Rfl:      Continuous Blood Gluc  (FREESTYLE BRE READER) MODESTA, 1 each 3 times daily (before meals) Use with bre sensor to check BG before meals and as needed, Disp: 1 Device, Rfl: 0     continuous blood glucose monitoring (FREESTYLE BRE) sensor, For use with Freestyle Bre Flash  for continuous monitioring of blood glucose levels. Replace sensor every 10 days., Disp: 3 each, Rfl: 11     DOCUSATE SODIUM PO, Take 100 mg by mouth daily, Disp: , Rfl:      DULoxetine (CYMBALTA) 30 MG EC capsule, Take 1 capsule (30 mg) by mouth 2 times daily Fill at patient request., Disp: 180 capsule, Rfl: 1     escitalopram (LEXAPRO) 10 MG tablet, , Disp: , Rfl: 2     estradiol (ESTRACE) 0.1 MG/GM vaginal cream, PLACE 2 GRAMS VAGINALLY TWICE A WEEK, Disp: 42.5 g, Rfl: 11     Estradiol-Norethindrone Acet 0.5-0.1 MG TABS, TAKE ONE TABLET BY MOUTH DAILY, Disp: 84 tablet, Rfl: 0     gabapentin (NEURONTIN) 300 MG capsule, , Disp: , Rfl:      glucose 40 % GEL, Take 15-30 g by mouth every 15 minutes as needed., Disp: 1 Tube, Rfl: 11     HydrOXYzine Pamoate (VISTARIL PO), Take 25 mg by mouth every 6 hours as needed for itching, Disp: , Rfl:      Injection Device for insulin (NOVOPEN HUNTER CARBAJAL,) MODESTA, 1 Device Inject 1 unit (which is marked as a 1/2 unit on the pen itself)  as needed when eating carb heavy meals., Disp: , Rfl:       insulin aspart (NOVOLOG PENFILL) 100 UNITS/ML injection, Inject 0.5-2 units 4 times daily, Disp: 3 mL, Rfl: 3     insulin glargine (LANTUS SOLOSTAR) 100 UNIT/ML pen, Inject 5 Units Subcutaneous every evening, Disp: 15 mL, Rfl: 3     insulin pen needle (BD MAHESH U/F) 32G X 4 MM, Use up to 3 times daily as needed for insulin dosing, Disp: 100 each, Rfl: prn     levothyroxine (SYNTHROID/LEVOTHROID) 88 MCG tablet, Take 1 tablet (88 mcg) by mouth daily, Disp: 90 tablet, Rfl: 3     loratadine (CLARITIN) 10 MG tablet, Take 10 mg by mouth daily as needed , Disp: , Rfl:      LORazepam (ATIVAN) 0.5 MG tablet, Take 1 tablet (0.5 mg) by mouth every 8 hours as needed for anxiety, Disp: 30 tablet, Rfl: 3     modafinil (PROVIGIL) 200 MG tablet, Take 2 tablets by mouth Once a Day, Disp: , Rfl:      naloxegol (MOVANTIK) 25 MG TABS tablet, Take 25 mg by mouth every morning (before breakfast), Disp: , Rfl:      naloxone (NARCAN) nasal spray, Spray 4 mg in nostril as needed for opioid reversal, Disp: , Rfl:      Nutritional Supplements (BOOST HIGH PROTEIN) LIQD, After above baseline labs are drawn, give: 6 mL/kg to maximum of 360 mL; the beverage is to be consumed within 5 minutes. (Patient not taking: Reported on 9/20/2018), Disp: , Rfl: 0     omeprazole (PRILOSEC) 40 MG DR capsule, Take 1 capsule (40 mg) by mouth 2 times daily Take 30-60 minutes before a meal., Disp: 180 capsule, Rfl: 2     ondansetron (ZOFRAN ODT) 4 MG ODT tab, Take 1-2 tablets (4-8 mg) by mouth 3 times daily (before meals), Disp: 20 tablet, Rfl: 3     polyethylene glycol (MIRALAX/GLYCOLAX) packet, Take 17 g by mouth daily, Disp: 7 packet, Rfl: 0     ranitidine (ZANTAC) 150 MG tablet, Take 1 tablet (150 mg) by mouth 2 times daily, Disp: 180 tablet, Rfl: 3     sennosides (SENOKOT) 8.6 MG tablet, Take 2 tablets by mouth 2 times daily, Disp: 120 tablet, Rfl: 1     Sharps Container MISC, For insulin needles, Disp: 1 each, Rfl: prn     traZODone (DESYREL) 50 MG  tablet, Take 1 tablet (50 mg) by mouth At Bedtime (Patient not taking: Reported on 2018), Disp: 90 tablet, Rfl: 3    SOCIAL HISTORY:  Social History     Socioeconomic History     Marital status:      Spouse name: Tera     Number of children: 2     Years of education: Not on file     Highest education level: Not on file   Social Needs     Financial resource strain: Not on file     Food insecurity - worry: Not on file     Food insecurity - inability: Not on file     Transportation needs - medical: Not on file     Transportation needs - non-medical: Not on file   Occupational History     Occupation: [employer] service     Employer: [employer]   Tobacco Use     Smoking status: Never Smoker     Smokeless tobacco: Never Used   Substance and Sexual Activity     Alcohol use: No     Alcohol/week: 0.0 oz     Drug use: No     Sexual activity: Yes     Partners: Male     Birth control/protection: None   Other Topics Concern     Parent/sibling w/ CABG, MI or angioplasty before 65F 55M? No   Social History Narrative     Not on file       FAMILY HISTORY:  FH of CRC:   FH of IBD: Crohn's disease mother   Family History   Problem Relation Age of Onset     Family History Negative Mother      Respiratory Father         COPD;  at 69     Genitourinary Problems Father         kidney stones     Substance Abuse Father      Depression Father      Asthma Father      Heart Disease Paternal Grandfather         M.I.     Coronary Artery Disease Paternal Grandfather      Hyperlipidemia Paternal Grandfather      Genitourinary Problems Brother         multiple brothers with kidney stones     Gastrointestinal Disease Maternal Grandmother         undiagnosed 'gut' issues     Coronary Artery Disease Maternal Grandfather      Hyperlipidemia Maternal Grandfather      Cerebrovascular Disease Paternal Grandmother         At the age of 103     Anxiety Disorder Paternal Grandmother      Osteoporosis Paternal Grandmother      Anxiety Disorder  Son      Anxiety Disorder Daughter      Asthma Daughter        Past/family/social history reviewed and no changes    Thank you for allowing me to participate in the care of your patient.      Sincerely,    Kirill Presley MD

## 2019-02-13 NOTE — NURSING NOTE
"Chief Complaint   Patient presents with     Consult     NEW        Vitals:    02/13/19 1107   BP: 127/77   Pulse: 93   Temp: 98.5  F (36.9  C)   TempSrc: Oral   SpO2: 97%   Weight: 68 kg (149 lb 14.4 oz)   Height: 1.626 m (5' 4\")       Body mass index is 25.73 kg/m .    Bobby Vences, EMT on 2/13/2019 at 11:10 AM                     "

## 2019-02-13 NOTE — PATIENT INSTRUCTIONS
Follow up: in 1 month with Dr. Presley and Shadia Fisher    Please call with any questions or concerns regarding your clinic visit today.    It is a pleasure being involved in your health care.    Contacts post-consultation depending on your need:    Schedule Clinic Appointments          [phone] # 1   M-F 7:30 - 5 pm    Lilian NINO RN Care Coordinator         [phone]  Elis Waller LPN            [phone]       OR Procedure Scheduling                [phone]    My Chart is available 24 hours a day and is a secure way to access your records and communicate with your care team.  I strongly recommend signing up if you haven't already done so, if you are comfortable with computers.  If you would like to inquire about this or are having problems with My Chart access, you may call [phone] or go online at [email or web address].  Please allow at least 24 hours for a response and extra time on weekends and Holidays.

## 2019-02-19 DIAGNOSIS — G47.00 PERSISTENT INSOMNIA: ICD-10-CM

## 2019-02-19 NOTE — TELEPHONE ENCOUNTER
LM for call back -  At LOV pt repotted not taking     TraZODone HCl 50 mg Oral AT BEDTIME   Patient not taking:  Reported on 8/23/2018             Sarika Osullivan RN

## 2019-02-19 NOTE — TELEPHONE ENCOUNTER
Pt called back and states she informed the provider and staff that she started back on this at her visit in January.  Please advise on refill  Neel Zarate RN, BSN

## 2019-02-19 NOTE — TELEPHONE ENCOUNTER
"Requested Prescriptions   Pending Prescriptions Disp Refills     traZODone (DESYREL) 50 MG tablet 90 tablet 3    Last Written Prescription Date:  01/12/2018  Last Fill Quantity: 90 tablet,  # refills: 3   Last office visit: 1/22/2019 with prescribing provider:  01/22/2019   Future Office Visit:     Sig: Take 1 tablet (50 mg) by mouth At Bedtime    Serotonin Modulators Passed - 2/19/2019  7:56 AM       Passed - Recent (12 mo) or future (30 days) visit within the authorizing provider's specialty    Patient had office visit in the last 12 months or has a visit in the next 30 days with authorizing provider or within the authorizing provider's specialty.  See \"Patient Info\" tab in inbasket, or \"Choose Columns\" in Meds & Orders section of the refill encounter.             Passed - Medication is active on med list       Passed - Patient is age 18 or older       Passed - No active pregnancy on record       Passed - No positive pregnancy test in past 12 months        Edy RAMOST  "

## 2019-02-20 RX ORDER — TRAZODONE HYDROCHLORIDE 50 MG/1
50 TABLET, FILM COATED ORAL AT BEDTIME
Qty: 90 TABLET | Refills: 3 | Status: SHIPPED | OUTPATIENT
Start: 2019-02-20 | End: 2019-07-09

## 2019-02-28 ENCOUNTER — MYC REFILL (OUTPATIENT)
Dept: FAMILY MEDICINE | Facility: CLINIC | Age: 54
End: 2019-02-28

## 2019-02-28 ENCOUNTER — MYC REFILL (OUTPATIENT)
Dept: PSYCHIATRY | Facility: CLINIC | Age: 54
End: 2019-02-28

## 2019-02-28 DIAGNOSIS — F41.9 ANXIETY: ICD-10-CM

## 2019-02-28 DIAGNOSIS — R11.0 NAUSEA: ICD-10-CM

## 2019-02-28 DIAGNOSIS — N95.2 VAGINAL ATROPHY: ICD-10-CM

## 2019-02-28 DIAGNOSIS — E03.9 ACQUIRED HYPOTHYROIDISM: ICD-10-CM

## 2019-02-28 DIAGNOSIS — K86.81 EXOCRINE PANCREATIC INSUFFICIENCY: ICD-10-CM

## 2019-02-28 RX ORDER — ESTRADIOL 0.1 MG/G
CREAM VAGINAL
Qty: 42.5 G | Refills: 11 | Status: CANCELLED | OUTPATIENT
Start: 2019-02-28

## 2019-02-28 RX ORDER — DULOXETIN HYDROCHLORIDE 30 MG/1
30 CAPSULE, DELAYED RELEASE ORAL 2 TIMES DAILY
Qty: 180 CAPSULE | Refills: 1 | Status: CANCELLED | OUTPATIENT
Start: 2019-02-28

## 2019-02-28 RX ORDER — LEVOTHYROXINE SODIUM 88 UG/1
88 TABLET ORAL DAILY
Qty: 90 TABLET | Refills: 3 | Status: CANCELLED | OUTPATIENT
Start: 2019-02-28

## 2019-02-28 NOTE — TELEPHONE ENCOUNTER
"Per Domonique Glynn's progress note on 5/25/17:  \"The patient is being returned to the referring provider for ongoing care and medication prescribing.\"    Routing to PCP for refill approval.      "

## 2019-03-01 RX ORDER — DULOXETIN HYDROCHLORIDE 30 MG/1
30 CAPSULE, DELAYED RELEASE ORAL 2 TIMES DAILY
Qty: 180 CAPSULE | Refills: 0 | Status: SHIPPED | OUTPATIENT
Start: 2019-03-01 | End: 2019-07-09

## 2019-03-01 NOTE — TELEPHONE ENCOUNTER
Pt returned call, takes  Cymbalta 30 mg BID    PHQ-9 SCORE 5/25/2017 1/2/2018 1/22/2019   PHQ-9 Total Score - - -   PHQ-9 Total Score MyChart - 11 (Moderate depression) -   PHQ-9 Total Score 5 11 23     TAMIE-7 SCORE 2/6/2017 5/25/2017 1/22/2019   Total Score - - -   Total Score 6 4 19     Also takes Lexapro in am  States she is also involved with provider at [facility], Cymbalta for depression and pain [facility]  Is also planning to make appt with Dr Spencer  Last Written Prescription Date:  5.25.17  Last Fill Quantity: 180,  # refills: 1   Last office visit: 1.22.19 with prescribing provider:  Todd   Future Office Visit:   Next 5 appointments (look out 90 days)    Mar 26, 2019  3:30 PM CDT  SHORT with Kavitha Spencer, DO  [facility] [id number] [place] [id number] [phone]         Requested Prescriptions   Pending Prescriptions Disp Refills     DULoxetine (CYMBALTA) 30 MG capsule 180 capsule 0     Sig: Take 1 capsule (30 mg) by mouth 2 times daily Fill at patient request.    There is no refill protocol information for this order              Route to provider to review and advise    Meredith Menendez RN Nurse Triage

## 2019-03-04 ENCOUNTER — TRANSFERRED RECORDS (OUTPATIENT)
Dept: HEALTH INFORMATION MANAGEMENT | Facility: CLINIC | Age: 54
End: 2019-03-04

## 2019-03-05 ENCOUNTER — TELEPHONE (OUTPATIENT)
Dept: FAMILY MEDICINE | Facility: CLINIC | Age: 54
End: 2019-03-05

## 2019-03-05 NOTE — TELEPHONE ENCOUNTER
Prior Authorization Retail Medication Request    Medication/Dose: omeprazole (PRILOSEC) 40 MG DR capsule  ICD code (if different than what is on RX):  Gastroesophageal reflux disease without esophagitis [K21.9] Nausea [R11.0]   Previously Tried and Failed:  PRILOSEC 40 MG OR CPDR,  NEXIUM 40 MG OR CPDR   Rationale:      COVER[employer]MEDS    KEY: [id number]  PATIENT LAST NAME: JAYDEN  : 1965      Pharmacy Information (if different than what is on RX)  Name:  [id number] [address] [address]  Phone:  [phone]    Edy DHILLON

## 2019-03-07 NOTE — TELEPHONE ENCOUNTER
Prior Authorization Approval    Authorization Effective Date: 3/5/2019  Authorization Expiration Date: 3/5/2020  Medication: omeprazole (PRILOSEC) 40 MG DR capsule - APPROVED  Approved Dose/Quantity: 180 FOR 90  Reference #:     Insurance Company: Other (see comments)  Expected CoPay:       CoPay Card Available:      Foundation Assistance Needed:    Which Pharmacy is filling the prescription (Not needed for infusion/clinic administered): [id number] - [place], [place] - [address] [address]  Pharmacy Notified: Yes  Patient Notified: Yes

## 2019-03-07 NOTE — TELEPHONE ENCOUNTER
PA Initiation    Medication: omeprazole (PRILOSEC) 40 MG  capsule - INITIATED  Insurance Company: [employer]  Pharmacy Filling the Rx: [id number] - [place], [place] - [address] [address]  Filling Pharmacy Phone: [phone]  Filling Pharmacy Fax:    Start Date: 3/7/2019

## 2019-03-08 ENCOUNTER — HOSPITAL ENCOUNTER (EMERGENCY)
Facility: CLINIC | Age: 54
Discharge: HOME | End: 2019-03-08
Attending: EMERGENCY MEDICINE | Admitting: EMERGENCY MEDICINE
Payer: COMMERCIAL

## 2019-03-08 ENCOUNTER — APPOINTMENT (OUTPATIENT)
Dept: GENERAL RADIOLOGY | Facility: CLINIC | Age: 54
End: 2019-03-08
Attending: EMERGENCY MEDICINE
Payer: COMMERCIAL

## 2019-03-08 VITALS
WEIGHT: 150 LBS | RESPIRATION RATE: 18 BRPM | HEIGHT: 64 IN | OXYGEN SATURATION: 98 % | DIASTOLIC BLOOD PRESSURE: 73 MMHG | HEART RATE: 51 BPM | SYSTOLIC BLOOD PRESSURE: 136 MMHG | BODY MASS INDEX: 25.61 KG/M2 | TEMPERATURE: 98.3 F

## 2019-03-08 DIAGNOSIS — R07.9 CHEST PAIN, UNSPECIFIED TYPE: ICD-10-CM

## 2019-03-08 LAB
ANION GAP SERPL CALCULATED.3IONS-SCNC: 6 MMOL/L (ref 3–14)
BASOPHILS # BLD AUTO: 0.1 10E9/L (ref 0–0.2)
BASOPHILS NFR BLD AUTO: 1.6 %
BUN SERPL-MCNC: 17 MG/DL (ref 7–30)
CALCIUM SERPL-MCNC: 9 MG/DL (ref 8.5–10.1)
CHLORIDE SERPL-SCNC: 106 MMOL/L (ref 94–109)
CO2 SERPL-SCNC: 26 MMOL/L (ref 20–32)
CREAT SERPL-MCNC: 0.76 MG/DL (ref 0.52–1.04)
D DIMER PPP FEU-MCNC: 0.5 UG/ML FEU (ref 0–0.5)
DIFFERENTIAL METHOD BLD: NORMAL
EOSINOPHIL # BLD AUTO: 0.1 10E9/L (ref 0–0.7)
EOSINOPHIL NFR BLD AUTO: 0.7 %
ERYTHROCYTE [DISTWIDTH] IN BLOOD BY AUTOMATED COUNT: 14.1 % (ref 10–15)
GFR SERPL CREATININE-BSD FRML MDRD: 87 ML/MIN/{1.73_M2}
GLUCOSE SERPL-MCNC: 160 MG/DL (ref 70–99)
HCT VFR BLD AUTO: 41.1 % (ref 35–47)
HGB BLD-MCNC: 13.2 G/DL (ref 11.7–15.7)
IMM GRANULOCYTES # BLD: 0 10E9/L (ref 0–0.4)
IMM GRANULOCYTES NFR BLD: 0.3 %
INTERPRETATION ECG - MUSE: NORMAL
LYMPHOCYTES # BLD AUTO: 1.9 10E9/L (ref 0.8–5.3)
LYMPHOCYTES NFR BLD AUTO: 28.4 %
MCH RBC QN AUTO: 29.8 PG (ref 26.5–33)
MCHC RBC AUTO-ENTMCNC: 32.1 G/DL (ref 31.5–36.5)
MCV RBC AUTO: 93 FL (ref 78–100)
MONOCYTES # BLD AUTO: 0.6 10E9/L (ref 0–1.3)
MONOCYTES NFR BLD AUTO: 8.8 %
NEUTROPHILS # BLD AUTO: 4.1 10E9/L (ref 1.6–8.3)
NEUTROPHILS NFR BLD AUTO: 60.2 %
NRBC # BLD AUTO: 0 10*3/UL
NRBC BLD AUTO-RTO: 0 /100
PLATELET # BLD AUTO: 368 10E9/L (ref 150–450)
POTASSIUM SERPL-SCNC: 4 MMOL/L (ref 3.4–5.3)
RBC # BLD AUTO: 4.43 10E12/L (ref 3.8–5.2)
SODIUM SERPL-SCNC: 138 MMOL/L (ref 133–144)
TROPONIN I SERPL-MCNC: <0.015 UG/L (ref 0–0.04)
WBC # BLD AUTO: 6.8 10E9/L (ref 4–11)

## 2019-03-08 PROCEDURE — 93005 ELECTROCARDIOGRAM TRACING: CPT

## 2019-03-08 PROCEDURE — 84484 ASSAY OF TROPONIN QUANT: CPT | Performed by: EMERGENCY MEDICINE

## 2019-03-08 PROCEDURE — 25000132 ZZH RX MED GY IP 250 OP 250 PS 637: Performed by: EMERGENCY MEDICINE

## 2019-03-08 PROCEDURE — 71046 X-RAY EXAM CHEST 2 VIEWS: CPT

## 2019-03-08 PROCEDURE — 99285 EMERGENCY DEPT VISIT HI MDM: CPT | Mod: 25

## 2019-03-08 PROCEDURE — 85025 COMPLETE CBC W/AUTO DIFF WBC: CPT | Performed by: EMERGENCY MEDICINE

## 2019-03-08 PROCEDURE — 25000128 H RX IP 250 OP 636: Performed by: EMERGENCY MEDICINE

## 2019-03-08 PROCEDURE — 80048 BASIC METABOLIC PNL TOTAL CA: CPT | Performed by: EMERGENCY MEDICINE

## 2019-03-08 PROCEDURE — 85379 FIBRIN DEGRADATION QUANT: CPT | Performed by: EMERGENCY MEDICINE

## 2019-03-08 RX ORDER — CLONIDINE HYDROCHLORIDE 0.1 MG/1
0.1 TABLET ORAL ONCE
Status: COMPLETED | OUTPATIENT
Start: 2019-03-08 | End: 2019-03-08

## 2019-03-08 RX ORDER — SODIUM CHLORIDE 9 MG/ML
1000 INJECTION, SOLUTION INTRAVENOUS CONTINUOUS
Status: DISCONTINUED | OUTPATIENT
Start: 2019-03-08 | End: 2019-03-08 | Stop reason: HOSPADM

## 2019-03-08 RX ORDER — LIDOCAINE 4 G/G
1 PATCH TOPICAL ONCE
Status: DISCONTINUED | OUTPATIENT
Start: 2019-03-08 | End: 2019-03-08 | Stop reason: HOSPADM

## 2019-03-08 RX ORDER — ACETAMINOPHEN 500 MG
1000 TABLET ORAL EVERY 4 HOURS PRN
Status: DISCONTINUED | OUTPATIENT
Start: 2019-03-08 | End: 2019-03-08 | Stop reason: HOSPADM

## 2019-03-08 RX ORDER — LIDOCAINE 50 MG/G
1 PATCH TOPICAL EVERY 24 HOURS
Qty: 7 PATCH | Refills: 0 | Status: SHIPPED | OUTPATIENT
Start: 2019-03-08 | End: 2019-04-27

## 2019-03-08 RX ADMIN — SODIUM CHLORIDE 1000 ML: 9 INJECTION, SOLUTION INTRAVENOUS at 12:37

## 2019-03-08 RX ADMIN — CLONIDINE HYDROCHLORIDE 0.1 MG: 0.1 TABLET ORAL at 16:04

## 2019-03-08 RX ADMIN — LIDOCAINE 1 PATCH: 560 PATCH PERCUTANEOUS; TOPICAL; TRANSDERMAL at 13:17

## 2019-03-08 RX ADMIN — ACETAMINOPHEN 1000 MG: 500 TABLET, FILM COATED ORAL at 13:16

## 2019-03-08 ASSESSMENT — ENCOUNTER SYMPTOMS
NAUSEA: 1
SHORTNESS OF BREATH: 0
VOMITING: 0
SLEEP DISTURBANCE: 1
COUGH: 0
FEVER: 0
WEAKNESS: 1
MYALGIAS: 1

## 2019-03-08 ASSESSMENT — MIFFLIN-ST. JEOR: SCORE: 1255.4

## 2019-03-08 NOTE — ED TRIAGE NOTES
Patient describing severe left sided chest pain worse when taking a deep breath.      Also is going through withdrawal symptoms from suboxone - two weeks since last dose per doctor's protocol.      ABCs intact.  Alert and oriented x 3.

## 2019-03-08 NOTE — ED PROVIDER NOTES
History     Chief Complaint:    Chest Pain    HPI   Lynn Thompson is a 54 year old female with a history of hypertension, pancreatitis, diabetes, amongst others, who presents with chest pain. The patient reports that she was prescribed opioids for 10 years for chronic pancreatitis, of which she had her spleen and pancreases removed and was then taking a low dose of Suboxone. 2 weeks ago, so annotates that she stopped taking the suboxone and has been experiencing withdrawal symptoms since that include nausea, weakness, body aches, and  sleep. The past few days she started to experience chest pain that initially was intermittent and now is constant with worsening pain this morning and sharp. The patient states that it worsens with breathing. She denies shortness of breath, cough, fever, vomiting, rash, injury or strain.     Allergies:  Povidone Iodine; Hives  Corticosteroids  NSAIDS  Sulfaalazine     Medications:    Viokace  Blood glucose monitoring supplies  Buspar  Ceftin  Clonidine  Freestyle diane reader  Docusate sodium  Cymbalta  Lexapro  Estrace  Estradiol-Norethindrone  Gabapentin  Gluocose 40% gel  Vistaril  Novopen  Novolog  Lantus Solostar  Synthroid/Levothroid  Ativan  Provigil  Movantik  Narcan  Prilosec  Zofran  Miralax  Zantac  Senokot   Trazodone     Past Medical History:    Abdominal pain, epigastric   Benign paroxysmal positional vertigo   Calculus of kidney   Chronic abdominal pain   Chronic pancreatitis    Depressive disorder  Diabetes    Dyspepsia and other specified disorders of function of stomach   Headache   Hypertension   Iron deficiency anemia secondary to inadequate dietary iron intake   Other chronic pain   Post-pancreatectomy diabetes  Pyelonephritis, unspecified   Regional enteritis of unspecified site   Sleep apnea   Spasm of sphincter of Oddi   Thyroid nodule   Ureteral calculus   Vaccination not carried out    Past Surgical History:    Appendectomy  Gastric bypass  c  "section  Partial ileum resection  Right ovarian cyst  r bunion  cholecystectomy  Combined cystoscopy, retrogrades, ureteroscopy, laser holmium lithotripsy ureters x2  Tummy tuck  EGD  Extracorporeal shock wave lithotripsy  Small bowel obstruction  Hernia repair  Laparoscopy lysis adhesions  Pancreatectomy     Family History:    Father: COPD, Kidney Stones, Substance Abuse, Asthma, Depression  Paternal Grandfather: SIMON VAIL, Hyperlipidemia  Brother: Kidney Stones  Son: Anxiety     Social History:  The patient was accompanied to the ED by .  Smoking Status: Never Smoker  Smokeless Tobacco: Never Used  Alcohol Use: Negative  Drug Use: Negative  PCP: Maryana Brooks   Marital Status:        Review of Systems   Constitutional: Negative for fever.   Respiratory: Negative for cough and shortness of breath.    Cardiovascular: Positive for chest pain.   Gastrointestinal: Positive for nausea. Negative for vomiting.   Musculoskeletal: Positive for myalgias.   Skin: Negative for rash.   Neurological: Positive for weakness.   Psychiatric/Behavioral: Positive for sleep disturbance.   All other systems reviewed and are negative.    Physical Exam     Patient Vitals for the past 24 hrs:   BP Temp Temp src Pulse Heart Rate Resp SpO2 Height Weight   03/08/19 1530 -- -- -- -- 55 16 99 % -- --   03/08/19 1500 133/73 -- -- 51 56 -- 100 % -- --   03/08/19 1400 134/75 -- -- 52 58 20 98 % -- --   03/08/19 1300 132/75 -- -- 52 56 21 100 % -- --   03/08/19 1200 132/78 -- -- 56 57 12 99 % -- --   03/08/19 1126 147/73 -- -- -- -- -- -- -- --   03/08/19 1122 -- 98.3  F (36.8  C) Temporal 89 -- 20 99 % 1.626 m (5' 4\") 68 kg (150 lb)      Physical Exam  Constitutional:  Appears well-developed and well-nourished. Alert. Conversant. Non toxic.  HENT:   Head: Atraumatic.   Nose: Nose normal.  Mouth/Throat: Oral mucosa is clear and moist. no trismus. Pharynx normal. Tonsils symmetric. No tonsillar enlargement, erythema, or " exudate.  Eyes: Conjunctivae normal. EOM normal. Pupils equal, round, and reactive to light. No scleral icterus.   Neck: Normal range of motion. Neck supple. No tracheal deviation present.   Cardiovascular: Normal rate, regular rhythm. No gallop. No friction rub. No murmur heard. Symmetric radial artery pulses   Pulmonary/Chest: Effort normal. No stridor. No respiratory distress. No wheezes. No rales. No rhonchi .  She does have mild tenderness of the chest wall just lateral to the left upper half of her sternum.  No shingles, ecchymosis, erythema, rash.  No crepitus.    Abdominal: Soft. Bowel sounds normal. No distension. No mass. No tenderness. No rebound. No guarding.   Musculoskeletal:   RUE: Normal range of motion. No tenderness. No deformity  LUE: Normal range of motion. No tenderness. No deformity  RLE: Normal range of motion. No edema. No tenderness. No deformity  LLE: Normal range of motion. No edema. No tenderness. No deformity  Lymph: No cervical adenopathy.   Neurological: Alert and oriented to person, place, and time. Normal strength. CN II-VII intact. No sensory deficit. GCS eye subscore is 4. GCS verbal subscore is 5. GCS motor subscore is 6. Normal coordination   Skin: Skin is warm and dry. No rash noted. No pallor. Normal capillary refill.  Psychiatric:  attentively at bedside.  He is calm and supportive.  Patient has initially somewhat anxious and hyperventilating.  She endorses a history of opiate dependence and is recently been managed on Suboxone.  With her doctors assistance she is tried to stop taking that it has been going through opiate withdrawal recently.  She has been managing this with clonidine and other meds.    Emergency Department Course     ECG:  ECG taken at 1119, ECG read at 1214  Normal sinus rhythm with sinus arrhythmia  Normal ECG  No significant change compared to EKG dated 5/10/16  Rate 78 bpm. DE interval 132 ms. QRS duration 82 ms. QT/QTc 388/442 ms. P-R-T axes 68 59  53.    Imaging:  Radiology findings were communicated with the patient who voiced understanding of the findings.    XR Chest 2 Views  Heart and pulmonary vessels within normal limits. Lungs clear. No  pleural effusion. Scattered surgical clips in the upper abdomen.  AMITA ATWOOD MD  Reading per radiology    Laboratory:  Laboratory findings were communicated with the patient who voiced understanding of the findings.    CBC: WBC 6.8, HGB 13.2,   BMP: Glucose 160 (H) o/w WNL (Creatinine 0.76)  D Dimer: 0.5  Troponin (Collected 1236): <0.015     Interventions:  1237 NS 1000 mL IV  1316 Tylenol 1000 mg PO  1317 Lidocare 1 patch  1604 Clonidine 0.1 mg PO    Emergency Department Course:     Nursing notes and vitals reviewed.    1119 EKG obtained as noted above.     1215 I performed an exam of the patient as documented above.     1236 IV was inserted and blood was drawn for laboratory testing, results above.     1350 Patient rechecked and updated.      1430 The patient was sent for a XR while in the emergency department, results above.      1550 Patient rechecked and updated.  The patient states her chest pain is improved. She is now feeling anxious from withdrawal and is requesting Clonidine which she takes at home.      I personally reviewed the imaging, lab, and EKG results with the patient and answered all related questions prior to discharge.    Impression & Plan      Medical Decision Making:  Lynn Thompson is a 54 year old femalewho presents to the ER today for evaluation of chest pain. Differential was broad.     No evidence of palpitations, syncope or other cardiac dysrhythmia.We considered possible ACS, however workup with EKG and troponin is negative.  Given time since onset of symptoms, I do not think the patient needs to be admitted for further sets of enzymes.     EKG shows no evidence for pericarditis. Clinical presentation on suggestive of myocarditis.    Chest x-ray shows no evidence for  pneumonia, pneumothorax, pulmonary edema, pleural effusion, rib fracture, cardiomegaly.  Mediastinum is normal on the x-ray and the patient has no pain through the back and symmetric pulses on exam so I doubt aortic dissection.    We considered PE for this patient.  D-dimer is normal.    Patient has been having a lot of withdrawal symptoms after stopping Suboxone recently including some nausea and vomiting.  No evidence on exam or chest x-ray of mediastinitis or Boerhaave syndrome.  Patient also did have mild tenderness to her left chest wall which could suggest a musculoskeletal etiology for her pain.    She felt much better after administration of Tylenol and lidocaine patch.  For obvious reasons, she was declining opiates.    While here in the ER she did present with initial anxiety and hyperventilation but that improved with treatment of her pain.  She was developing some symptoms of her opiate withdrawal and requested clonidine which she would normally take for the symptoms at home.  I did provide her a dose here prior to discharge.      Diagnosis:    ICD-10-CM    1. Chest pain, unspecified type R07.9       Disposition:   The patient is discharged to home.     Discharge Medications:          START taking      Dose / Directions   lidocaine 5 % patch  Commonly known as:  LIDODERM      Dose:  1 patch  Place 1 patch onto the skin every 24 hours  Quantity:  7 patch  Refills:  0              Where to get your medicines      Some of these will need a paper prescription and others can be bought over the counter. Ask your nurse if you have questions.    Bring a paper prescription for each of these medications    lidocaine 5 % patch         Scribe Disclosure:  I, Orla Severson, am serving as a scribe at 12:20 PM on 3/8/2019 to document services personally performed by Alen Zaragoza MD based on my observations and the provider's statements to me.     [facility] EMERGENCY DEPARTMENT       Nik  Alen Araiza MD  03/09/19 9807

## 2019-03-08 NOTE — ED AVS SNAPSHOT
[facility] Emergency Department  [address]  [facility] [address]  Phone:  [phone]  Fax:  [phone]                                    Lynn Thompson   MRN: [id number]    Department:  [facility] Emergency Department   Date of Visit:  3/8/2019           After Visit Summary Signature Page    I have received my discharge instructions, and my questions have been answered. I have discussed any challenges I see with this plan with the nurse or doctor.    ..........................................................................................................................................  Patient/Patient Representative Signature      ..........................................................................................................................................  Patient Representative Print Name and Relationship to Patient    ..................................................               ................................................  Date                                   Time    ..........................................................................................................................................  Reviewed by Signature/Title    ...................................................              ..............................................  Date                                               Time          22EPIC Rev 08/18

## 2019-03-25 DIAGNOSIS — R68.82 DECREASED LIBIDO: ICD-10-CM

## 2019-03-25 RX ORDER — ESTRADIOL AND NORETHINDRONE ACETATE .5; .1 MG/1; MG/1
TABLET ORAL
Qty: 84 TABLET | Refills: 0 | Status: SHIPPED | OUTPATIENT
Start: 2019-03-25 | End: 2019-03-26

## 2019-03-25 NOTE — PROGRESS NOTES
SUBJECTIVE:  Lynn Thompson is an 54 year old  postmenopausal woman   who presents for annual gyn exam. Menopause at age 48. No   bleeding, spotting, or discharge noted.     Estrogen replacement therapy: continuous daily oral regimen  TED exposure: no  History of abnormal Pap smear: No  Family history of uterine or ovarian cancer: No  Regular self breast exam: No  History of abnormal mammogram: No  Family history of breast cancer: Yes  History of abnormal lipids: No        - Pt is doing well, would like her vaginal & oral estradiol refilled at today's visit.         Past Medical History:   Diagnosis Date     Abdominal pain, epigastric , ongoing; goes to pain clinic    probable recurrent spasm sphincter of Oddi causing biliary colic pains      Benign paroxysmal positional vertigo     occ.      Calculus of kidney 5/05    x1 on L side passed, several stones.  Has been tested for oxalate.     Chronic abdominal pain 2013     Chronic pancreatitis (H) 2013     Depressive disorder, not elsewhere classified     also occ panic spells     Diabetes (H)     post pancreatectomy     Dyspepsia and other specified disorders of function of stomach     H. pylori   treated     Headache(784.0)     still periodic HA's ;  often 5X/week     Hypertension 16    Stress related     Iron deficiency anemia secondary to inadequate dietary iron intake     relates to gastric bypass     Other chronic pain      Post-pancreatectomy diabetes (H) 2013     Pyelonephritis, unspecified , 10/8    L side     Regional enteritis of unspecified site     inacive/remission     Sleep apnea     uses Cpap     Spasm of sphincter of Oddi     ERCP with Stent of CBD by Fernandez @ [facility] with sx relief     Spasm of sphincter of Oddi     surgical + endoscopic stenting of pancreatic duct @ [facility] 06     Thyroid nodule 2016     Ureteral calculus 10/2/2012     Vaccination not carried out           Family History    Problem Relation Age of Onset     Family History Negative Mother      Respiratory Father         COPD;  at 69     Genitourinary Problems Father         kidney stones     Substance Abuse Father      Depression Father      Asthma Father      Heart Disease Paternal Grandfather         M.I.     Coronary Artery Disease Paternal Grandfather      Hyperlipidemia Paternal Grandfather      Genitourinary Problems Brother         multiple brothers with kidney stones     Gastrointestinal Disease Maternal Grandmother         undiagnosed 'gut' issues     Coronary Artery Disease Maternal Grandfather      Hyperlipidemia Maternal Grandfather      Cerebrovascular Disease Paternal Grandmother         At the age of 103     Anxiety Disorder Paternal Grandmother      Osteoporosis Paternal Grandmother      Anxiety Disorder Son      Anxiety Disorder Daughter      Asthma Daughter        Past Surgical History:   Procedure Laterality Date     APPENDECTOMY       C NONSPECIFIC PROCEDURE      R bunion     C NONSPECIFIC PROCEDURE      T & A     C NONSPECIFIC PROCEDURE      partial ileum resection     C NONSPECIFIC PROCEDURE      R ovarian cyst     C NONSPECIFIC PROCEDURE           C NONSPECIFIC PROCEDURE      GALL BLADDER     C NONSPECIFIC PROCEDURE      CBD stent; Dr. Presley     C NONSPECIFIC PROCEDURE   &     colonoscopy     C NONSPECIFIC PROCEDURE      Gastric bypass     CHOLECYSTECTOMY       COLONOSCOPY       COMBINED CYSTOSCOPY, RETROGRADES, URETEROSCOPY, LASER HOLMIUM LITHOTRIPSY URETER(S), INSERT STENT Right 3/23/2015    Procedure: COMBINED CYSTOSCOPY, RETROGRADES, URETEROSCOPY, LASER HOLMIUM LITHOTRIPSY URETER(S), INSERT STENT;  Surgeon: Kennedi Aldana MD;  Location: [facility] OR     COMBINED CYSTOSCOPY, RETROGRADES, URETEROSCOPY, LASER HOLMIUM LITHOTRIPSY URETER(S), INSERT STENT Right 2015    Procedure: COMBINED CYSTOSCOPY, RETROGRADES, URETEROSCOPY, LASER HOLMIUM  LITHOTRIPSY URETER(S), INSERT STENT;  Surgeon: Kennedi Aldana MD;  Location: [facility] OR     COSMETIC SURGERY  6/24/2002    Tummy tuck     CYSTOSCOPY, RETROGRADES, INSERT STENT URETER(S), COMBINED  10/2/2012    Procedure: COMBINED CYSTOSCOPY, RETROGRADES, INSERT STENT URETER(S);  COMBINED CYSTOSCOPY,  , INSERT LEFT STENT URETER;  Surgeon: Johny Baez MD;  Location: [facility] OR     ENT SURGERY       ESOPHAGOSCOPY, GASTROSCOPY, DUODENOSCOPY (EGD), COMBINED N/A 1/24/2018    Procedure: COMBINED ESOPHAGOSCOPY, GASTROSCOPY, DUODENOSCOPY (EGD);  ESOPHAGOSCOPY, GASTROSCOPY, DUODENOSCOPY (EGD)    ;  Surgeon: Tamir Rodgers MD;  Location: [facility] GI     EXTRACORPOREAL SHOCK WAVE LITHOTRIPSY (ESWL)  10/16/2012    Procedure: EXTRACORPOREAL SHOCK WAVE LITHOTRIPSY (ESWL);  left EXTRACORPOREAL SHOCK WAVE LITHOTRIPSY (ESWL) ;  Surgeon: Johny Baez MD;  Location: [facility] OR     GI SURGERY  12/29/2015    Small bowel obstruction     HC LAP,LYSIS OF ADHESIONS       HERNIA REPAIR  2/2015     LAPAROSCOPIC LYSIS ADHESIONS N/A 2/20/2015    Procedure: LAPAROSCOPIC LYSIS ADHESIONS;  Surgeon: Aaron Early MD;  [facility]     LAPAROSCOPIC LYSIS ADHESIONS N/A 12/29/2015    Procedure: LAPAROSCOPIC LYSIS ADHESIONS;  Surgeon: Aaron Early MD;  [facility]     PANCREATECTOMY, TRANSPLANT AUTO ISLET CELL, COMBINED  5/10/2013    Procedure: COMBINED PANCREATECTOMY, TRANSPLANT AUTO ISLET CELL;  Pancreatectomy, Auto Islet Cell Transplant   hernia repair, jejunostomy tube and liver biopsies with Anesthesia General with block;  Surgeon: Aaron Early MD;  Location: [facility] OR     TRANSPLANT  5/10/13       Current Outpatient Medications   Medication     amylase-lipase-protease (VIOKACE) 63668 units TABS tablet     blood glucose (BILL MICROLET 2) lancing device     blood glucose monitoring (BILL CONTOUR NEXT) test strip     blood glucose monitoring (NO BRAND SPECIFIED) test strip     Continuous Blood Gluc  (FREESTYLE ADAM READER)  MODESTA     continuous blood glucose monitoring (FREESTYLE ADAM) sensor     DULoxetine (CYMBALTA) 30 MG capsule     escitalopram (LEXAPRO) 10 MG tablet     estradiol (ESTRACE) 0.1 MG/GM vaginal cream     Estradiol-Norethindrone Acet 0.5-0.1 MG TABS     gabapentin (NEURONTIN) 300 MG capsule     glucose 40 % GEL     HydrOXYzine Pamoate (VISTARIL PO)     levothyroxine (SYNTHROID/LEVOTHROID) 88 MCG tablet     loratadine (CLARITIN) 10 MG tablet     modafinil (PROVIGIL) 200 MG tablet     omeprazole (PRILOSEC) 40 MG DR capsule     ondansetron (ZOFRAN ODT) 4 MG ODT tab     polyethylene glycol (MIRALAX/GLYCOLAX) packet     ranitidine (ZANTAC) 150 MG tablet     traZODone (DESYREL) 50 MG tablet     ACETAMINOPHEN PO     amylase-lipase-protease (VIOKACE) 05251 units TABS tablet     buprenorphine HCl-naloxone HCl (SUBOXONE) 2-0.5 MG per film     busPIRone (BUSPAR) 15 MG tablet     cefuroxime (CEFTIN) 500 MG tablet     CLONIDINE HCL PO     DOCUSATE SODIUM PO     Injection Device for insulin (NOVOPEN JR, GREEN,) MODESTA     insulin aspart (NOVOLOG PENFILL) 100 UNITS/ML injection     insulin glargine (LANTUS SOLOSTAR) 100 UNIT/ML pen     insulin pen needle (BD MAHESH U/F) 32G X 4 MM     lidocaine (LIDODERM) 5 % patch     LORazepam (ATIVAN) 0.5 MG tablet     naloxegol (MOVANTIK) 25 MG TABS tablet     naloxone (NARCAN) nasal spray     Nutritional Supplements (BOOST HIGH PROTEIN) LIQD     sennosides (SENOKOT) 8.6 MG tablet     Sharps Container MISC     No current facility-administered medications for this visit.      Allergies   Allergen Reactions     Povidone Iodine Hives     Causes skin to blister     Corticosteroids Other (See Comments)     All oral,IV and injectable steroids are contraindicated (unless in life threatening situations) in Islet Auto transplant recipients. They can cause irreversible loss of islet cell function. Please contact patients transplant care coordinator JONATHAN Carvalho RN at /pager   and  "Endocrinologist prior to administration.      Nsaids      naprosyn = GI upset     Povidone Iodine      blisters     Sulfasalazine Nausea and Nausea and Vomiting       Social History     Tobacco Use     Smoking status: Never Smoker     Smokeless tobacco: Never Used   Substance Use Topics     Alcohol use: No     Alcohol/week: 0.0 oz       Review Of Systems  Ears/Nose/Throat: negative  Respiratory: No shortness of breath, dyspnea on exertion, cough, or hemoptysis  Cardiovascular: negative  Gastrointestinal: negative  Genitourinary: negative  Constitutional, HEENT, cardiovascular, pulmonary, GI, , musculoskeletal, neuro, skin, endocrine and psych systems are negative, except as otherwise noted.        This document serves as a record of the services and decisions personally performed and made by Kavitha Spencer DO. It was created on her behalf by Lisbeth Babcock, a trained medical scribe. The creation of this document is based the provider's statements to the medical scribe.  Scribe Lisbeth Babcock 3:40 PM, March 26, 2019    OBJECTIVE:  /76   Ht 1.626 m (5' 4\")   Wt 68.4 kg (150 lb 11.2 oz)   LMP 12/19/2013   BMI 25.87 kg/m    General appearance: healthy, alert and no distress  Skin: Skin color, texture, turgor normal. No rashes or lesions.  Ears: negative  Nose/Sinuses: Nares normal. Septum midline. Mucosa normal. No drainage or sinus tenderness.  Oropharynx: Lips, mucosa, and tongue normal. Teeth and gums normal.  Neck: Neck supple. No adenopathy. Thyroid symmetric, normal size,, Carotids without bruits.  Lungs: negative, Percussion normal. Good diaphragmatic excursion. Lungs clear  Heart: negative, PMI normal. No lifts, heaves, or thrills. RRR. No murmurs, clicks gallops or rub  Breasts: Inspection negative. No nipple discharge or bleeding. No masses.  Abdomen: Abdomen soft, non-tender. BS normal. No masses, organomegaly  Pelvic: Pelvic examination with pap/without Gonorrhea and Chlamydia   including  External " genitalia normal   and vagina normal rugatted mildly atrophic  Examination of urethra  normal no masses, tenderness, scarring  bladder, no masses or tenderness  Cervix no lesions or discharge  Bimanual exam with   Uterus 7 weeks size, mid position, mobile, no tenderness, no descent   Adnexa/parametria normal            ASSESSMENT:  Lynn Thompson is an 54 year old  postmenopausal woman   who presents for annual gyn exam.     PLAN:  Dx: Annual gyn exam; Estrogen replacement therapy  1)  Labs completed with PCP; Pap smear - pathology pending; Mammogram ordered  2)  Estrogen replacement therapy: Estradiol [vaginal & oral] refilled today   - Will discuss discontinuing oral dose next year  3)  Return to clinic in 1 year for annual gyn exam; otherwise as needed.       Rx:  - estradiol 0.1 mg/gm vaginal cream, Place 2 grams vaginally twice a week  - estradiol-norethindrone 0.5-0.1 mg 1 tab po daily        PE:  Reviewed health maintenance including diet, regular exercise,   estrogen replacement and periodic exams.        The information in this document, created by the medical scribe for me, accurately reflects the services I personally performed and the decisions made by me. I have reviewed and approved this document for accuracy prior to leaving the patient care area.  3:40 PM, 19    Dr. Kavitha Spencer, DO    Obstetrics and Gynecology  [facility] [place] [facility] [place]

## 2019-03-26 ENCOUNTER — OFFICE VISIT (OUTPATIENT)
Dept: OBGYN | Facility: CLINIC | Age: 54
End: 2019-03-26

## 2019-03-26 VITALS
WEIGHT: 150.7 LBS | BODY MASS INDEX: 25.73 KG/M2 | DIASTOLIC BLOOD PRESSURE: 76 MMHG | HEIGHT: 64 IN | SYSTOLIC BLOOD PRESSURE: 126 MMHG

## 2019-03-26 DIAGNOSIS — N95.2 VAGINAL ATROPHY: ICD-10-CM

## 2019-03-26 DIAGNOSIS — R68.82 DECREASED LIBIDO: ICD-10-CM

## 2019-03-26 DIAGNOSIS — Z00.00 ENCOUNTER FOR PREVENTATIVE ADULT HEALTH CARE EXAMINATION: Primary | ICD-10-CM

## 2019-03-26 PROCEDURE — G0145 SCR C/V CYTO,THINLAYER,RESCR: HCPCS | Performed by: FAMILY MEDICINE

## 2019-03-26 PROCEDURE — 87624 HPV HI-RISK TYP POOLED RSLT: CPT | Performed by: FAMILY MEDICINE

## 2019-03-26 PROCEDURE — 99396 PREV VISIT EST AGE 40-64: CPT | Performed by: FAMILY MEDICINE

## 2019-03-26 RX ORDER — ESTRADIOL 0.1 MG/G
CREAM VAGINAL
Qty: 42.5 G | Refills: 11 | Status: SHIPPED | OUTPATIENT
Start: 2019-03-26 | End: 2020-04-12

## 2019-03-26 RX ORDER — ESTRADIOL AND NORETHINDRONE ACETATE .5; .1 MG/1; MG/1
1 TABLET ORAL DAILY
Qty: 84 TABLET | Refills: 3 | Status: SHIPPED | OUTPATIENT
Start: 2019-03-26 | End: 2020-03-05

## 2019-03-26 ASSESSMENT — MIFFLIN-ST. JEOR: SCORE: 1258.57

## 2019-03-26 NOTE — PATIENT INSTRUCTIONS
Return yearly     Dr. Kavitha Spencer, DO    Obstetrics and Gynecology  [facility] [place] [facility] [place]

## 2019-03-26 NOTE — NURSING NOTE
"Chief Complaint   Patient presents with     Gyn Exam     pap due--needs refill on Estrogen       Initial /76   Ht 1.626 m (5' 4\")   Wt 68.4 kg (150 lb 11.2 oz)   LMP 2013   BMI 25.87 kg/m   Estimated body mass index is 25.87 kg/m  as calculated from the following:    Height as of this encounter: 1.626 m (5' 4\").    Weight as of this encounter: 68.4 kg (150 lb 11.2 oz).  BP completed using cuff size: regular    Questioned patient about current smoking habits.  Pt. has never smoked.          The following HM Due: pap smear    "

## 2019-03-29 LAB
COPATH REPORT: NORMAL
PAP: NORMAL

## 2019-04-01 ENCOUNTER — OFFICE VISIT (OUTPATIENT)
Dept: GASTROENTEROLOGY | Facility: CLINIC | Age: 54
End: 2019-04-01
Payer: COMMERCIAL

## 2019-04-01 VITALS
BODY MASS INDEX: 26 KG/M2 | WEIGHT: 151.5 LBS | HEART RATE: 79 BPM | SYSTOLIC BLOOD PRESSURE: 128 MMHG | OXYGEN SATURATION: 98 % | DIASTOLIC BLOOD PRESSURE: 73 MMHG

## 2019-04-01 DIAGNOSIS — R14.0 BLOATING: ICD-10-CM

## 2019-04-01 DIAGNOSIS — Z90.410 POST-PANCREATECTOMY DIABETES (H): ICD-10-CM

## 2019-04-01 DIAGNOSIS — E13.9 POST-PANCREATECTOMY DIABETES (H): ICD-10-CM

## 2019-04-01 DIAGNOSIS — E89.1 POST-PANCREATECTOMY DIABETES (H): ICD-10-CM

## 2019-04-01 DIAGNOSIS — Z98.84 HISTORY OF ROUX-EN-Y GASTRIC BYPASS: ICD-10-CM

## 2019-04-01 DIAGNOSIS — Z94.89 HISTORY OF PANCREATIC ISLET CELL TRANSPLANTATION: ICD-10-CM

## 2019-04-01 DIAGNOSIS — Z71.3 NUTRITIONAL COUNSELING: Primary | ICD-10-CM

## 2019-04-01 DIAGNOSIS — K90.9 DIARRHEA DUE TO MALABSORPTION: Primary | ICD-10-CM

## 2019-04-01 DIAGNOSIS — R19.7 DIARRHEA DUE TO MALABSORPTION: Primary | ICD-10-CM

## 2019-04-01 NOTE — LETTER
4/1/2019       RE: Lynn Thompson  [address] [facility] [address]     Dear Colleague,    Thank you for referring your patient, Lynn Thompson, to the [facility] GASTROENTEROLOGY AND IBD CLINIC at [facility]. Please see a copy of my visit note below.    [facility] Outpatient Medical Nutrition Therapy      Time Spent:  60 minutes  Session Type:  Initial Individual Session  Referring Physician:  Kirill Presley MD  Reason for RD Visit:   Nutritional counseling, s/p gastric bypass gale en Y and hx TPIAT.     Nutrition Assessment:  Patient is here for initial visit with Registered Dietitian (RD).  Patient is a 54 year old female with history of gale en Y gastric bypass in 2003, chronic pancreatitis s/p TPIAT in 5/2013 and post pancreatectomy diabetes.  Patient reported that about three times per week, it feels like food is stuck or sitting in her stomach. When she has this feeling, she feels very full, her stomach gets distended, has pain, nausea and dry heaves and sometimes then has to go to the bathroom and has a large bowel movement. Some of the stool is formed and part of it is loose. She stated that this feeling of food feeling stuck has gotten worse over the past 9 months. She stated that she cannot eat a lot of pasta or rice and can only tolerate a small portion. She eats using a small plate. She also stated that she has some anxiety when eating with people other than her family due to history of people watching her first d/t hx of overweight, gastric bypass and then after that having issues with chronic pancreatitis where she was unable to eat. She stated that she has a lot of stress and anxiety in her life, and does feel that her emotions contribute to her symptoms at times as well.     She sometimes skips lunch meal or forgets to eat until a little later if busy doing something or working on her tablet. At night-time, she wants to graze/snack and reported  "that it is not due to hunger but mindlessly eating after everyone is asleep. She drinks about 60-72 oz water per day and 32 oz black coffee. She will drink about 3 cans of diet soda per week and feels that the carbonation actually helps her feel better.    Patient reported the following at visit today:  1. Nausea/vomiting? -Gets nausea and dry heaves about 3 times per week when feels like food is stuck  2. Heartburn? -Yes but taking omeprazole and ranitidine helps.  3. Bloating? -Yes  4. Belching? -No  5. Feeling full quickly? -Yes  6. Decreased appetite? -Lately appetite has been decreased and feels that some of it is due to stress with sick animal, financial problems, death in the family.  7. Weight loss/gain? -She stated that she has gained some weight  8. Constipation/Diarrhea? -Improved since stopping senna, more formed. Some days has several stools and other days may go 3 days without BM.  9. Abdominal pain/pain with or without eating? -Yes but not as severe as when she feels like food is stuck.  10. Changes in blood sugar levels? -Does test her BS. In the morning they run about , right before lunch they are about 130-150 and then before dinner about 115-120. She is on a sliding scale insulin.    Height:   Ht Readings from Last 1 Encounters:   03/26/19 1.626 m (5' 4\")     Weight:    Wt Readings from Last 10 Encounters:   04/01/19 68.7 kg (151 lb 8 oz)   03/26/19 68.4 kg (150 lb 11.2 oz)   03/08/19 68 kg (150 lb)   02/13/19 68 kg (149 lb 14.4 oz)   01/22/19 68 kg (150 lb)   01/17/19 67.7 kg (149 lb 3.2 oz)   10/08/18 63.5 kg (140 lb)   09/20/18 66.2 kg (146 lb)   08/31/18 66.3 kg (146 lb 1.6 oz)   08/23/18 66.3 kg (146 lb 1.6 oz)      BMI: 25.87    Diet Recall:  (some usual meals):   Meal Food    Breakfast 7am: Yogurt with feliciano seeds (3 tablets viokase)   Lunch 1-2pm: Soup OR eggs and toast OR leftovers (takes 5 viokase) OR skips OR just watermelon and takes (2-3 viokase)   Dinner 5:30-6:30pm: Steak with " potato and snap peas OR chicken stir lovell OR seafood  Pasta (5 viokase)   Snacks Sugar-free popsicles (twin pops and had 1-2 which is 2-4 sf popsicles), 3 c popcorn with butter or sleeve soda crackers   Beverages 60 oz-72 oz Water or more, ~32 oz black coffee, occasional herbal tea   Alcohol Intake none     Frequency of eating/taking out meals: 2x/month     Labs:  Reviewed.  Pertinent Medications/vitamin and mineral supplements:   Calcium citrate, vitamin D, fish oil,    Food Allergies:  NKFA  Physical Activity:  Takes steady dog walks 4-5 times per week for 30-60 minutes  Estimated Nutrition Needs based on current body weight of 68 k-1700 calories (20-25 kcals/kg), 68g protein (1g/kg), ~1 ml/kcal or total fluids per MD.     MALNUTRITION:  % Weight Loss:  None noted  % Intake:  Decreased intake does not meet criteria for malnutrition   Subcutaneous Fat Loss:  Orbital region mild depletion  Muscle Loss:  None observed  Fluid Retention:  None noted    Malnutrition Diagnosis: Patient does not meet two of the above criteria necessary for diagnosing malnutrition  In Context of:  Chronic illness or disease    Nutrition Diagnosis:    Altered GI function related to hx gale en Y gastric bypass, hx TPIAT  as evidenced by pt's medical history and reports of bloating, variable stools, nausea, dry heaves, heartburn and abdominal pain.    Nutrition Prescription:    Nutrition Intervention:    Nutrition Education/Counseling:  Provided diet education with tips and suggestion to help with symptom management related to pt report of bloating, nausea, dry heaves, heartburn, food feeling stuck, and variable stools. Reviewed post op diet guidelines for gale en Y including foods that are/are not recommended post op especially if causing symptoms. Discussed fluid recommendations and vitamin/mineral supplement recommendations. Continue to avoid all alcoholic beverages, limit caffeinated beverages to no more than 1-2 cups per day and  explained recommendation to avoid carbonated beverages. Encouraged pt to continue enzymes per MD order and continue taking her dose throughout visit. Recommended pt not skip meals/lunch and aim for eating lunch about 4-6 hours after breakfast meal to help remind her to eat. Discussed option for having a protein drink for a meal replacement if desired. Encouraged her to avoid mindless eating and not to eat in front of the television. Explained that this can contribute to overeating at a meal/snack and cause symptoms. Encouraged pt to keep a daily food and beverage journal and track her symptoms. She would also like to track her emotions because she feels this contributes to her symptoms as well. See goals below for some specific recommendations discussed at visit today.    Patient verbalized understanding of education provided. See Goals below.     Goals:  1. Do not skip meals (lunch meal). Aim to eat lunch about 4-6 hours after breakfast (~12-1pm). Can have a protein drink for a meal replacement if desired.  2. Chew food very well before swallowing (think applesauce consistency before swalowing)  3. Restart multivitamin (chewable recommended) unless otherwise directed by MD.  4. Continue taking enzymes spread out throughout meals.  5. Do not graze on snacks at night/avoid mindless eating.  If truly hungry or if going a long stretch between meals, okay for a healthy planned snack. Can try having snow peas for a snack or hard boiled egg, watermelon, sugar free yogurt.  6. If you feel like you need a snack at night and not truly hungry, okay for a sugar free popsicle or caffeine free herbal tea to.  7. Continue drinking at least 64 oz water per day.  8. Limit coffee/caffeinated beverages to drink only 1-2 cups (8-16 oz) per day.  9. Keep a daily food and beverage journals and track all symptoms to see if you noticed any triggers, patterns that cause symptoms. Also track emotions/stress/anxiety as well.    Nutrition  Monitoring and Evaluation: Will monitor adherence to nutrition recommendations at future RD visits.     Further Medical Nutrition Therapy:  Recommended f/u when here for MD follow up. MD recommended f/u with him and RD in one month.  Next Appointment (if applicable):  Gave pt scheduling information and number  Patient was encouraged to call/contact RD with any further questions.    Shadia Fisher, MS, RD, LD

## 2019-04-01 NOTE — LETTER
4/1/2019       RE: Lynn Thompson  [address] [facility] [address]     Dear Colleague,    Thank you for referring your patient, Lynn Thompson, to the [facility] PANCREAS AND BILIARY at [facility]. Please see a copy of my visit note below.    Pt seen in follow-up one month after being evaluated for diarrhea and abdominal pain 6 years post TPIAT. Also post gale en Y gastric bypass.    At last visit, recommended below: All done. Significant improvement in diarrhea. However, still has episodic severe bouts of crampy pain. No clear precip factors.   Has just finished complete taper off seboxone. Had difficult withdrawal, worst now over.  IMP: 25 minutes more than 50% counseling. Concerned that many of symptoms may represent withrdrawal from very long term narcotics, just completed. Would not pursue more workup or empiric trials of antibiotics for SIBO. Did not work in past.   REC: follow-up in 2 months, after more distance from seboxone, and will consider other workup or therapies at that time.    .mkf    IMP:  . Since main problem is diarrhea, not responsive to various manipulations of panc enzymes, and not responsive to course of cipro for SIBO, suspect it may be medication induced. ALSO may be related to wean off many years of narcotic analgesics,, and perhaps due to siboxone.      Rec wean down senna and miralax. Add fiber in form of metamucil or citrucel.   ALSO: recommend start ACUPUNCTURE which may be useful adjunct to motility disorders. Perhaps dr Brooks can recommend?  Follow-up in one month w me and dietitian Shadia Fisher.    Kirill Presley MD GI Staff

## 2019-04-01 NOTE — PATIENT INSTRUCTIONS
It was nice meeting you today:    1. Do not skip meals (lunch meal). Aim to eat lunch about 4-6 hours after breakfast (~12-1pm). Can have a protein drink for a meal replacement if desired.  2. Chew food very well before swallowing (think applesauce consistency before swalowing)  3. Restart multivitamin (chewable recommended) unless other directed by MD.  4. Continue taking enzymes spread out throughout meals.  5. Do not graze on snacks at night/avoid mindless eating.  If truly hungry or if going a long stretch between meals, okay for a healthy planned snack. Can try having snow peas for a snack or hard boiled egg, watermelon, sugar free yogurt.  6. If you feel like you need a snack at night and not truly hungry, okay for a sugar free popsicle or caffeine free herbal tea to.  7. Continue drinking at least 64 oz water per day.  8. Limit coffee/caffeinated beverages to drink only 1-2 cups (8-16 oz) per day.  9.Keep a daily food and beverage journals and track all symptoms to see if you noticed any triggers, patterns that cause symptoms. Also track emotions/stress/anxiety as well.    Shadia Fisher, MS, RD, LD    If you would like to schedule a follow up appointment with Shadia Fisher, Registered Dietitian, please call [phone].

## 2019-04-01 NOTE — PROGRESS NOTES
Pt seen in follow-up one month after being evaluated for diarrhea and abdominal pain 6 years post TPIAT. Also post gale en Y gastric bypass.    At last visit, recommended below: All done. Significant improvement in diarrhea. However, still has episodic severe bouts of crampy pain. No clear precip factors.   Has just finished complete taper off seboxone. Had difficult withdrawal, worst now over.  IMP: 25 minutes more than 50% counseling. Concerned that many of symptoms may represent withrdrawal from very long term narcotics, just completed. Would not pursue more workup or empiric trials of antibiotics for SIBO. Did not work in past.   REC: follow-up in 2 months, after more distance from seboxone, and will consider other workup or therapies at that time.    .[email or web address]       Kirill Presley MD GI Staff         IMP:  . Since main problem is diarrhea, not responsive to various manipulations of panc enzymes, and not responsive to course of cipro for SIBO, suspect it may be medication induced. ALSO may be related to wean off many years of narcotic analgesics,, and perhaps due to siboxone.      Rec wean down senna and miralax. Add fiber in form of metamucil or citrucel.   ALSO: recommend start ACUPUNCTURE which may be useful adjunct to motility disorders. Perhaps dr Brooks can recommend?  Follow-up in one month w me and dietitian Shadia Fisher.

## 2019-04-01 NOTE — NURSING NOTE
Chief Complaint   Patient presents with     RECHECK     Return       Vitals:    04/01/19 1409   BP: 128/73   Pulse: 79   SpO2: 98%   Weight: 68.7 kg (151 lb 8 oz)       Body mass index is 26 kg/m .    Flakita Guevara CMA

## 2019-04-01 NOTE — PROGRESS NOTES
[facility] Outpatient Medical Nutrition Therapy      Time Spent:  60 minutes  Session Type:  Initial Individual Session  Referring Physician:  Kirill Presley MD  Reason for RD Visit:   Nutritional counseling, s/p gastric bypass gale en Y and hx TPIAT.     Nutrition Assessment:  Patient is here for initial visit with Registered Dietitian (RD).  Patient is a 54 year old female with history of gale en Y gastric bypass in 2003, chronic pancreatitis s/p TPIAT in 5/2013 and post pancreatectomy diabetes.  Patient reported that about three times per week, it feels like food is stuck or sitting in her stomach. When she has this feeling, she feels very full, her stomach gets distended, has pain, nausea and dry heaves and sometimes then has to go to the bathroom and has a large bowel movement. Some of the stool is formed and part of it is loose. She stated that this feeling of food feeling stuck has gotten worse over the past 9 months. She stated that she cannot eat a lot of pasta or rice and can only tolerate a small portion. She eats using a small plate. She also stated that she has some anxiety when eating with people other than her family due to history of people watching her first d/t hx of overweight, gastric bypass and then after that having issues with chronic pancreatitis where she was unable to eat. She stated that she has a lot of stress and anxiety in her life, and does feel that her emotions contribute to her symptoms at times as well.     She sometimes skips lunch meal or forgets to eat until a little later if busy doing something or working on her tablet. At night-time, she wants to graze/snack and reported that it is not due to hunger but mindlessly eating after everyone is asleep. She drinks about 60-72 oz water per day and 32 oz black coffee. She will drink about 3 cans of diet soda per week and feels that the carbonation actually helps her feel better.    Patient reported the following at visit  "today:  1. Nausea/vomiting? -Gets nausea and dry heaves about 3 times per week when feels like food is stuck  2. Heartburn? -Yes but taking omeprazole and ranitidine helps.  3. Bloating? -Yes  4. Belching? -No  5. Feeling full quickly? -Yes  6. Decreased appetite? -Lately appetite has been decreased and feels that some of it is due to stress with sick animal, financial problems, death in the family.  7. Weight loss/gain? -She stated that she has gained some weight  8. Constipation/Diarrhea? -Improved since stopping senna, more formed. Some days has several stools and other days may go 3 days without BM.  9. Abdominal pain/pain with or without eating? -Yes but not as severe as when she feels like food is stuck.  10. Changes in blood sugar levels? -Does test her BS. In the morning they run about , right before lunch they are about 130-150 and then before dinner about 115-120. She is on a sliding scale insulin.    Height:   Ht Readings from Last 1 Encounters:   03/26/19 1.626 m (5' 4\")     Weight:    Wt Readings from Last 10 Encounters:   04/01/19 68.7 kg (151 lb 8 oz)   03/26/19 68.4 kg (150 lb 11.2 oz)   03/08/19 68 kg (150 lb)   02/13/19 68 kg (149 lb 14.4 oz)   01/22/19 68 kg (150 lb)   01/17/19 67.7 kg (149 lb 3.2 oz)   10/08/18 63.5 kg (140 lb)   09/20/18 66.2 kg (146 lb)   08/31/18 66.3 kg (146 lb 1.6 oz)   08/23/18 66.3 kg (146 lb 1.6 oz)      BMI: 25.87    Diet Recall:  (some usual meals):   Meal Food    Breakfast 7am: Yogurt with feliciano seeds (3 tablets viokase)   Lunch 1-2pm: Soup OR eggs and toast OR leftovers (takes 5 viokase) OR skips OR just watermelon and takes (2-3 viokase)   Dinner 5:30-6:30pm: Steak with potato and snap peas OR chicken stir lovell OR seafood  Pasta (5 viokase)   Snacks Sugar-free popsicles (twin pops and had 1-2 which is 2-4 sf popsicles), 3 c popcorn with butter or sleeve soda crackers   Beverages 60 oz-72 oz Water or more, ~32 oz black coffee, occasional herbal tea   Alcohol " Intake none     Frequency of eating/taking out meals: 2x/month     Labs:  Reviewed.  Pertinent Medications/vitamin and mineral supplements:   Calcium citrate, vitamin D, fish oil,    Food Allergies:  NKFA  Physical Activity:  Takes steady dog walks 4-5 times per week for 30-60 minutes  Estimated Nutrition Needs based on current body weight of 68 k-1700 calories (20-25 kcals/kg), 68g protein (1g/kg), ~1 ml/kcal or total fluids per MD.     MALNUTRITION:  % Weight Loss:  None noted  % Intake:  Decreased intake does not meet criteria for malnutrition   Subcutaneous Fat Loss:  Orbital region mild depletion  Muscle Loss:  None observed  Fluid Retention:  None noted    Malnutrition Diagnosis: Patient does not meet two of the above criteria necessary for diagnosing malnutrition  In Context of:  Chronic illness or disease    Nutrition Diagnosis:    Altered GI function related to hx gale en Y gastric bypass, hx TPIAT  as evidenced by pt's medical history and reports of bloating, variable stools, nausea, dry heaves, heartburn and abdominal pain.    Nutrition Prescription:    Nutrition Intervention:    Nutrition Education/Counseling:  Provided diet education with tips and suggestion to help with symptom management related to pt report of bloating, nausea, dry heaves, heartburn, food feeling stuck, and variable stools. Reviewed post op diet guidelines for gale en Y including foods that are/are not recommended post op especially if causing symptoms. Discussed fluid recommendations and vitamin/mineral supplement recommendations. Continue to avoid all alcoholic beverages, limit caffeinated beverages to no more than 1-2 cups per day and explained recommendation to avoid carbonated beverages. Encouraged pt to continue enzymes per MD order and continue taking her dose throughout visit. Recommended pt not skip meals/lunch and aim for eating lunch about 4-6 hours after breakfast meal to help remind her to eat. Discussed option  for having a protein drink for a meal replacement if desired. Encouraged her to avoid mindless eating and not to eat in front of the television. Explained that this can contribute to overeating at a meal/snack and cause symptoms. Encouraged pt to keep a daily food and beverage journal and track her symptoms. She would also like to track her emotions because she feels this contributes to her symptoms as well. See goals below for some specific recommendations discussed at visit today.    Patient verbalized understanding of education provided. See Goals below.     Goals:  1. Do not skip meals (lunch meal). Aim to eat lunch about 4-6 hours after breakfast (~12-1pm). Can have a protein drink for a meal replacement if desired.  2. Chew food very well before swallowing (think applesauce consistency before swalowing)  3. Restart multivitamin (chewable recommended) unless otherwise directed by MD.  4. Continue taking enzymes spread out throughout meals.  5. Do not graze on snacks at night/avoid mindless eating.  If truly hungry or if going a long stretch between meals, okay for a healthy planned snack. Can try having snow peas for a snack or hard boiled egg, watermelon, sugar free yogurt.  6. If you feel like you need a snack at night and not truly hungry, okay for a sugar free popsicle or caffeine free herbal tea to.  7. Continue drinking at least 64 oz water per day.  8. Limit coffee/caffeinated beverages to drink only 1-2 cups (8-16 oz) per day.  9. Keep a daily food and beverage journals and track all symptoms to see if you noticed any triggers, patterns that cause symptoms. Also track emotions/stress/anxiety as well.    Nutrition Monitoring and Evaluation: Will monitor adherence to nutrition recommendations at future RD visits.     Further Medical Nutrition Therapy:  Recommended f/u when here for MD follow up. MD recommended f/u with him and RD in one month.  Next Appointment (if applicable):  Gave pt scheduling  information and number  Patient was encouraged to call/contact RD with any further questions.    Shadia Fisher, MS, RD, LD

## 2019-04-02 LAB
FINAL DIAGNOSIS: NORMAL
HPV HR 12 DNA CVX QL NAA+PROBE: NEGATIVE
HPV16 DNA SPEC QL NAA+PROBE: NEGATIVE
HPV18 DNA SPEC QL NAA+PROBE: NEGATIVE
SPECIMEN DESCRIPTION: NORMAL
SPECIMEN SOURCE CVX/VAG CYTO: NORMAL

## 2019-04-08 ENCOUNTER — ANCILLARY PROCEDURE (OUTPATIENT)
Dept: MAMMOGRAPHY | Facility: CLINIC | Age: 54
End: 2019-04-08
Attending: FAMILY MEDICINE
Payer: COMMERCIAL

## 2019-04-08 DIAGNOSIS — Z00.00 ENCOUNTER FOR PREVENTATIVE ADULT HEALTH CARE EXAMINATION: ICD-10-CM

## 2019-04-08 PROCEDURE — 77067 SCR MAMMO BI INCL CAD: CPT | Mod: TC

## 2019-04-27 ENCOUNTER — OFFICE VISIT (OUTPATIENT)
Dept: URGENT CARE | Facility: URGENT CARE | Age: 54
End: 2019-04-27
Payer: COMMERCIAL

## 2019-04-27 VITALS
TEMPERATURE: 98.9 F | WEIGHT: 154 LBS | SYSTOLIC BLOOD PRESSURE: 112 MMHG | BODY MASS INDEX: 26.29 KG/M2 | OXYGEN SATURATION: 98 % | RESPIRATION RATE: 16 BRPM | DIASTOLIC BLOOD PRESSURE: 66 MMHG | HEIGHT: 64 IN | HEART RATE: 76 BPM

## 2019-04-27 DIAGNOSIS — E89.1 POST-PANCREATECTOMY DIABETES (H): ICD-10-CM

## 2019-04-27 DIAGNOSIS — Z90.410 POST-PANCREATECTOMY DIABETES (H): ICD-10-CM

## 2019-04-27 DIAGNOSIS — R30.0 DYSURIA: Primary | ICD-10-CM

## 2019-04-27 DIAGNOSIS — R82.90 NONSPECIFIC FINDING ON EXAMINATION OF URINE: ICD-10-CM

## 2019-04-27 DIAGNOSIS — H92.03 OTALGIA, BILATERAL: ICD-10-CM

## 2019-04-27 DIAGNOSIS — E13.9 POST-PANCREATECTOMY DIABETES (H): ICD-10-CM

## 2019-04-27 DIAGNOSIS — Z76.0 MEDICATION REFILL: ICD-10-CM

## 2019-04-27 LAB
ALBUMIN UR-MCNC: ABNORMAL MG/DL
APPEARANCE UR: ABNORMAL
BACTERIA #/AREA URNS HPF: ABNORMAL /HPF
BILIRUB UR QL STRIP: NEGATIVE
COLOR UR AUTO: YELLOW
GLUCOSE UR STRIP-MCNC: NEGATIVE MG/DL
HGB UR QL STRIP: ABNORMAL
KETONES UR STRIP-MCNC: NEGATIVE MG/DL
LEUKOCYTE ESTERASE UR QL STRIP: ABNORMAL
NITRATE UR QL: POSITIVE
NON-SQ EPI CELLS #/AREA URNS LPF: ABNORMAL /LPF
PH UR STRIP: 6 PH (ref 5–7)
RBC #/AREA URNS AUTO: ABNORMAL /HPF
SOURCE: ABNORMAL
SP GR UR STRIP: >1.03 (ref 1–1.03)
UROBILINOGEN UR STRIP-ACNC: 0.2 EU/DL (ref 0.2–1)
WBC #/AREA URNS AUTO: ABNORMAL /HPF

## 2019-04-27 PROCEDURE — 87088 URINE BACTERIA CULTURE: CPT | Performed by: FAMILY MEDICINE

## 2019-04-27 PROCEDURE — 99214 OFFICE O/P EST MOD 30 MIN: CPT | Performed by: FAMILY MEDICINE

## 2019-04-27 PROCEDURE — 87086 URINE CULTURE/COLONY COUNT: CPT | Performed by: FAMILY MEDICINE

## 2019-04-27 PROCEDURE — 81001 URINALYSIS AUTO W/SCOPE: CPT | Performed by: FAMILY MEDICINE

## 2019-04-27 PROCEDURE — 87186 SC STD MICRODIL/AGAR DIL: CPT | Performed by: FAMILY MEDICINE

## 2019-04-27 RX ORDER — CEFDINIR 300 MG/1
300 CAPSULE ORAL 2 TIMES DAILY
Qty: 14 CAPSULE | Refills: 0 | Status: SHIPPED | OUTPATIENT
Start: 2019-04-27 | End: 2019-07-09

## 2019-04-27 RX ORDER — FLUCONAZOLE 150 MG/1
150 TABLET ORAL ONCE
Qty: 1 TABLET | Refills: 0 | Status: SHIPPED | OUTPATIENT
Start: 2019-04-27 | End: 2019-04-27

## 2019-04-27 ASSESSMENT — MIFFLIN-ST. JEOR: SCORE: 1273.54

## 2019-04-27 NOTE — PROGRESS NOTES
SUBJECTIVE:   Lynn Thompson is a 54 year old female with history of hypertension, diabetes, iron deficiency anemia, status post transplant of the pancreas, asplenia depressive disorder, sleep apnea who  presents today for a possible UTI. Symptoms of dysuria and frequency have been going on for 5day(s).  Hematuria yes .  sudden onsetand moderate.  There is no history of fever, chills, nausea or vomiting.  No history of vaginal or discharge. This patient does not  have a history of urinary tract infections. Patient denies rigors, flank pain, temperature > 101 degrees F. and Vomiting, significant nausea or diarrhea or vaginal discharge   She has no worsening flank pain   Has some concerns with bilateral ear pain for last 5 days     Past Medical History:   Diagnosis Date     Abdominal pain, epigastric 11/05, ongoing; goes to pain clinic    probable recurrent spasm sphincter of Oddi causing biliary colic pains      Benign paroxysmal positional vertigo     occ.      Calculus of kidney 5/05    x1 on L side passed, several stones.  Has been tested for oxalate.     Chronic abdominal pain 7/17/2013     Chronic pancreatitis (H) 7/17/2013     Depressive disorder, not elsewhere classified     also occ panic spells     Diabetes (H)     post pancreatectomy     Dyspepsia and other specified disorders of function of stomach 6/99    H. 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    [place] [facility] [place]     Spasm of sphincter of Oddi     surgical + endoscopic  stenting of pancreatic duct @ [facility] [place] 5/23/06     Thyroid nodule 11/1/2016     Ureteral calculus 10/2/2012     Vaccination not carried out      Current Outpatient Medications   Medication Sig Dispense Refill     ACETAMINOPHEN PO Take 325 mg by mouth every 6 hours as needed for pain       amylase-lipase-protease (VIOKACE) 90247 units TABS tablet Take 4-5 caps with meals and 1-2 with snacks 600 tablet 5     blood glucose (BILL MICROLET 2) lancing device Use to test blood sugars 6 times daily or as directed. 1 each 11     blood glucose monitoring (BILL CONTOUR NEXT) test strip Check BS 4-6 times a day 2 Box 6     blood glucose monitoring (NO BRAND SPECIFIED) test strip        cefdinir (OMNICEF) 300 MG capsule Take 1 capsule (300 mg) by mouth 2 times daily for 7 days 14 capsule 0     DULoxetine (CYMBALTA) 30 MG capsule Take 1 capsule (30 mg) by mouth 2 times daily Fill at patient request. 180 capsule 0     escitalopram (LEXAPRO) 10 MG tablet   2     estradiol (ESTRACE) 0.1 MG/GM vaginal cream PLACE 2 GRAMS VAGINALLY TWICE A WEEK 42.5 g 11     Estradiol-Norethindrone Acet 0.5-0.1 MG TABS Take 1 tablet by mouth daily 84 tablet 3     fluconazole (DIFLUCAN) 150 MG tablet Take 1 tablet (150 mg) by mouth once for 1 dose 1 tablet 0     gabapentin (NEURONTIN) 300 MG capsule        glucose 40 % GEL Take 15-30 g by mouth every 15 minutes as needed. 1 Tube 11     HydrOXYzine Pamoate (VISTARIL PO) Take 25 mg by mouth every 6 hours as needed for itching       Injection Device for insulin (NOVOPEN FabHUNTER,) MODESTA 1 Device Inject 1 unit (which is marked as a 1/2 unit on the pen itself)  as needed when eating carb heavy meals.       insulin aspart (NOVOLOG PENFILL) 100 UNITS/ML injection Inject 0.5-2 units 4 times daily 3 mL 3     insulin glargine (LANTUS SOLOSTAR) 100 UNIT/ML pen Inject 5 Units Subcutaneous every evening 15 mL 3     insulin pen needle (BD MAHESH U/F) 32G X 4 MM Use up to 3 times daily as needed for insulin dosing 100  each prn     levothyroxine (SYNTHROID/LEVOTHROID) 88 MCG tablet Take 1 tablet (88 mcg) by mouth daily 90 tablet 3     modafinil (PROVIGIL) 200 MG tablet Take 2 tablets by mouth Once a Day       omeprazole (PRILOSEC) 40 MG DR capsule Take 1 capsule (40 mg) by mouth 2 times daily Take 30-60 minutes before a meal. 180 capsule 2     ondansetron (ZOFRAN ODT) 4 MG ODT tab Take 1-2 tablets (4-8 mg) by mouth 3 times daily (before meals) 20 tablet 3     Sharps Container MISC For insulin needles 1 each prn     traZODone (DESYREL) 50 MG tablet Take 1 tablet (50 mg) by mouth At Bedtime 90 tablet 3     cefuroxime (CEFTIN) 500 MG tablet Take 1 tablet (500 mg) by mouth 2 times daily (Patient not taking: Reported on 3/26/2019) 20 tablet 0     CLONIDINE HCL PO Take 0.1 mg by mouth 2 times daily as needed       Continuous Blood Gluc  (FREESTYLE ADAM READER) MODESTA 1 each 3 times daily (before meals) Use with adam sensor to check BG before meals and as needed (Patient not taking: Reported on 4/27/2019) 1 Device 0     continuous blood glucose monitoring (FREESTYLE ADAM) sensor For use with Freestyle Adam Flash  for continuous monitioring of blood glucose levels. Replace sensor every 10 days. (Patient not taking: Reported on 4/27/2019) 3 each 11     loratadine (CLARITIN) 10 MG tablet Take 10 mg by mouth daily as needed        Nutritional Supplements (BOOST HIGH PROTEIN) LIQD After above baseline labs are drawn, give: 6 mL/kg to maximum of 360 mL; the beverage is to be consumed within 5 minutes. (Patient not taking: Reported on 4/27/2019)  0     polyethylene glycol (MIRALAX/GLYCOLAX) packet Take 17 g by mouth daily 7 packet 0     ranitidine (ZANTAC) 150 MG tablet Take 1 tablet (150 mg) by mouth 2 times daily 180 tablet 3     Social History     Tobacco Use     Smoking status: Never Smoker     Smokeless tobacco: Never Used   Substance Use Topics     Alcohol use: No     Alcohol/week: 0.0 oz       ROS:   10 point ROS of  "systems including Constitutional, Eyes, Respiratory, Cardiovascular, Gastroenterology,, Integumentary, Muscularskeletal, Psychiatric were all negative except for pertinent positives noted in my HPI           OBJECTIVE:  /66 (BP Location: Right arm, Patient Position: Chair, Cuff Size: Adult Regular)   Pulse 76   Temp 98.9  F (37.2  C) (Oral)   Resp 16   Ht 1.626 m (5' 4\")   Wt 69.9 kg (154 lb)   LMP 12/19/2013   SpO2 98%   BMI 26.43 kg/m    GENERAL APPEARANCE: healthy, alert and no distress  RESP: lungs clear to auscultation - no rales, rhonchi or wheezes  EARS: canals clear, TM retracted not injected .  CV: regular rates and rhythm, normal S1 S2, no murmur noted  ABDOMEN:  soft, nontender, no HSM or masses and bowel sounds normal  BACK: No CVA tenderness  SKIN: no suspicious lesions or rashes    ASSESSMENT:   Lower, uncomplicated urinary tract infection.  Lynn was seen today for uti.    Diagnoses and all orders for this visit:    Dysuria  -     *UA reflex to Microscopic and Culture ([place] [facility] (except [place] and [place])  -     Urine Microscopic  -     Urine Culture Aerobic Bacterial  -     cefdinir (OMNICEF) 300 MG capsule; Take 1 capsule (300 mg) by mouth 2 times daily for 7 days    Nonspecific finding on examination of urine  -     Urine Culture Aerobic Bacterial    Medication refill  -     fluconazole (DIFLUCAN) 150 MG tablet; Take 1 tablet (150 mg) by mouth once for 1 dose    Post-pancreatectomy diabetes (H)    Otalgia, bilateral          PLAN:  As per ordered above  Drink plenty of fluids.  Prevention and treatment of UTI's discussed.Signs and symptoms of pyelonephritis mentioned.  Follow up with primary care physician if not improving  Discussed with patient to continue on her medications  We will treat with Omnicef for the bladder infection  Urine culture pending  Patient is very prone to yeast infection after antibiotic treatment so a prescription for Diflucan was " called in too  Assured pt that otalgia seems to be related to no infection   Could be from allergy   Follow up if  symptoms fail to improve or worsens   Pt understood and agreed with ely Brooks MD

## 2019-04-29 LAB
BACTERIA SPEC CULT: ABNORMAL
SPECIMEN SOURCE: ABNORMAL

## 2019-05-07 ENCOUNTER — APPOINTMENT (OUTPATIENT)
Dept: CT IMAGING | Facility: CLINIC | Age: 54
End: 2019-05-07
Attending: EMERGENCY MEDICINE
Payer: COMMERCIAL

## 2019-05-07 ENCOUNTER — HOSPITAL ENCOUNTER (EMERGENCY)
Facility: CLINIC | Age: 54
Discharge: HOME | End: 2019-05-07
Attending: EMERGENCY MEDICINE | Admitting: EMERGENCY MEDICINE
Payer: COMMERCIAL

## 2019-05-07 VITALS
OXYGEN SATURATION: 95 % | SYSTOLIC BLOOD PRESSURE: 134 MMHG | BODY MASS INDEX: 25.61 KG/M2 | HEART RATE: 56 BPM | WEIGHT: 150 LBS | DIASTOLIC BLOOD PRESSURE: 71 MMHG | TEMPERATURE: 97.9 F | HEIGHT: 64 IN

## 2019-05-07 DIAGNOSIS — R10.9 FLANK PAIN: ICD-10-CM

## 2019-05-07 LAB
ALBUMIN UR-MCNC: NEGATIVE MG/DL
ANION GAP SERPL CALCULATED.3IONS-SCNC: 5 MMOL/L (ref 3–14)
APPEARANCE UR: CLEAR
BACTERIA #/AREA URNS HPF: ABNORMAL /HPF
BASOPHILS # BLD AUTO: 0.1 10E9/L (ref 0–0.2)
BASOPHILS NFR BLD AUTO: 1.2 %
BILIRUB UR QL STRIP: NEGATIVE
BUN SERPL-MCNC: 24 MG/DL (ref 7–30)
CALCIUM SERPL-MCNC: 8.4 MG/DL (ref 8.5–10.1)
CHLORIDE SERPL-SCNC: 106 MMOL/L (ref 94–109)
CO2 SERPL-SCNC: 27 MMOL/L (ref 20–32)
COLOR UR AUTO: ABNORMAL
CREAT SERPL-MCNC: 0.9 MG/DL (ref 0.52–1.04)
DIFFERENTIAL METHOD BLD: NORMAL
EOSINOPHIL # BLD AUTO: 0.1 10E9/L (ref 0–0.7)
EOSINOPHIL NFR BLD AUTO: 1 %
ERYTHROCYTE [DISTWIDTH] IN BLOOD BY AUTOMATED COUNT: 14.1 % (ref 10–15)
GFR SERPL CREATININE-BSD FRML MDRD: 71 ML/MIN/{1.73_M2}
GLUCOSE SERPL-MCNC: 182 MG/DL (ref 70–99)
GLUCOSE UR STRIP-MCNC: 500 MG/DL
HCT VFR BLD AUTO: 38.6 % (ref 35–47)
HGB BLD-MCNC: 12.3 G/DL (ref 11.7–15.7)
HGB UR QL STRIP: NEGATIVE
IMM GRANULOCYTES # BLD: 0 10E9/L (ref 0–0.4)
IMM GRANULOCYTES NFR BLD: 0.2 %
KETONES UR STRIP-MCNC: NEGATIVE MG/DL
LEUKOCYTE ESTERASE UR QL STRIP: NEGATIVE
LYMPHOCYTES # BLD AUTO: 2.9 10E9/L (ref 0.8–5.3)
LYMPHOCYTES NFR BLD AUTO: 33.4 %
MCH RBC QN AUTO: 29.7 PG (ref 26.5–33)
MCHC RBC AUTO-ENTMCNC: 31.9 G/DL (ref 31.5–36.5)
MCV RBC AUTO: 93 FL (ref 78–100)
MONOCYTES # BLD AUTO: 0.8 10E9/L (ref 0–1.3)
MONOCYTES NFR BLD AUTO: 8.7 %
NEUTROPHILS # BLD AUTO: 4.8 10E9/L (ref 1.6–8.3)
NEUTROPHILS NFR BLD AUTO: 55.5 %
NITRATE UR QL: NEGATIVE
NRBC # BLD AUTO: 0 10*3/UL
NRBC BLD AUTO-RTO: 0 /100
PH UR STRIP: 5.5 PH (ref 5–7)
PLATELET # BLD AUTO: 397 10E9/L (ref 150–450)
POTASSIUM SERPL-SCNC: 3.7 MMOL/L (ref 3.4–5.3)
RBC # BLD AUTO: 4.14 10E12/L (ref 3.8–5.2)
RBC #/AREA URNS AUTO: 1 /HPF (ref 0–2)
SODIUM SERPL-SCNC: 138 MMOL/L (ref 133–144)
SOURCE: ABNORMAL
SP GR UR STRIP: 1.01 (ref 1–1.03)
SQUAMOUS #/AREA URNS AUTO: 2 /HPF (ref 0–1)
UROBILINOGEN UR STRIP-MCNC: NORMAL MG/DL (ref 0–2)
WBC # BLD AUTO: 8.6 10E9/L (ref 4–11)
WBC #/AREA URNS AUTO: <1 /HPF (ref 0–5)

## 2019-05-07 PROCEDURE — 74176 CT ABD & PELVIS W/O CONTRAST: CPT

## 2019-05-07 PROCEDURE — 25000128 H RX IP 250 OP 636: Performed by: EMERGENCY MEDICINE

## 2019-05-07 PROCEDURE — 96361 HYDRATE IV INFUSION ADD-ON: CPT

## 2019-05-07 PROCEDURE — 25800030 ZZH RX IP 258 OP 636: Performed by: EMERGENCY MEDICINE

## 2019-05-07 PROCEDURE — 99285 EMERGENCY DEPT VISIT HI MDM: CPT | Mod: 25

## 2019-05-07 PROCEDURE — 96376 TX/PRO/DX INJ SAME DRUG ADON: CPT

## 2019-05-07 PROCEDURE — 25000132 ZZH RX MED GY IP 250 OP 250 PS 637: Performed by: EMERGENCY MEDICINE

## 2019-05-07 PROCEDURE — 96374 THER/PROPH/DIAG INJ IV PUSH: CPT

## 2019-05-07 PROCEDURE — 81001 URINALYSIS AUTO W/SCOPE: CPT | Performed by: EMERGENCY MEDICINE

## 2019-05-07 PROCEDURE — 80048 BASIC METABOLIC PNL TOTAL CA: CPT | Performed by: EMERGENCY MEDICINE

## 2019-05-07 PROCEDURE — 85025 COMPLETE CBC W/AUTO DIFF WBC: CPT | Performed by: EMERGENCY MEDICINE

## 2019-05-07 RX ORDER — SODIUM CHLORIDE 9 MG/ML
INJECTION, SOLUTION INTRAVENOUS CONTINUOUS
Status: DISCONTINUED | OUTPATIENT
Start: 2019-05-07 | End: 2019-05-07 | Stop reason: HOSPADM

## 2019-05-07 RX ORDER — POLYETHYLENE GLYCOL 3350 17 G/17G
1 POWDER, FOR SOLUTION ORAL 4 TIMES DAILY
Qty: 2040 G | Refills: 0 | Status: SHIPPED | OUTPATIENT
Start: 2019-05-07 | End: 2019-06-06

## 2019-05-07 RX ORDER — HYDROMORPHONE HYDROCHLORIDE 1 MG/ML
.5-1 INJECTION, SOLUTION INTRAMUSCULAR; INTRAVENOUS; SUBCUTANEOUS
Status: DISCONTINUED | OUTPATIENT
Start: 2019-05-07 | End: 2019-05-07 | Stop reason: HOSPADM

## 2019-05-07 RX ADMIN — Medication 1 MG: at 16:56

## 2019-05-07 RX ADMIN — MAGNESIUM CITRATE 286 ML: 1.75 LIQUID ORAL at 20:02

## 2019-05-07 RX ADMIN — SODIUM CHLORIDE: 9 INJECTION, SOLUTION INTRAVENOUS at 16:56

## 2019-05-07 RX ADMIN — Medication 1 MG: at 20:02

## 2019-05-07 ASSESSMENT — ENCOUNTER SYMPTOMS
BLOOD IN STOOL: 0
DYSURIA: 0
BACK PAIN: 1
FLANK PAIN: 1
VOMITING: 0
NAUSEA: 1

## 2019-05-07 ASSESSMENT — MIFFLIN-ST. JEOR: SCORE: 1255.4

## 2019-05-07 NOTE — ED PROVIDER NOTES
History     Chief Complaint:  Left-Sided Back Pain and Left Flank Pain     The history is provided by the patient.      Lynn Thompson is a 54 year old female who presents with two days of left thoracic back pain that wraps to her left flank and inferiorly into her groin. She complains of nausea. Patient notes her pain feels similar compared to her history of stones. Patient denies any current dysuria, bloody stool, or vomiting. Review of records indicate she was started on Omnicef 300 mg BID for seven days on 4/27, her culture and sensitivities are as detailed below.     Urine culture on 4/27/19:   >100,000 colonies/mL E.Coli with sensitivity for cephalosporins.    Allergies:  Povidone Iodine; Hives  Corticosteroids  Sulfaalazine      Medications:    Viokace  Buspar  Ceftin  Clonidine  Cymbalta  Lexapro  Estrace  Estradiol-Norethindrone  Gabapentin  Vistaril  Novopen  Novolog  Lantus Solostar  Synthroid  Ativan  Provigil  Movantik  Prilosec  Miralax  Trazodone      Past Medical History:    Benign paroxysmal positional vertigo   Calculus of kidney         Chronic abdominal pain            Chronic pancreatitis      Depressive disorder  Diabetes            Dyspepsia and other specified disorders of function of stomach             Hypertension     Iron deficiency anemia secondary to inadequate dietary iron intake   Other chronic pain        Post-pancreatectomy diabetes  Pyelonephritis, unspecified       Regional enteritis of unspecified site    Sleep apnea      Spasm of sphincter of Oddi      Thyroid nodule              Ureteral calculus              Past Surgical History:    Appendectomy  Gastric bypass  c section  Partial ileum resection  Right ovarian cyst  r bunion  cholecystectomy  Combined cystoscopy, retrogrades, ureteroscopy, laser holmium lithotripsy ureters x2  Tummy tuck  Extracorporeal shock wave lithotripsy  Small bowel obstruction  Hernia repair  Laparoscopy lysis adhesions  Pancreatectomy     "  Family History:    Father: COPD, Kidney Stones, Substance Abuse, Asthma, Depression  Paternal Grandfather: MI, C.A.D., Hyperlipidemia    Social History:  Smoking Status: Never Smoker  Smokeless Tobacco: Never Used  Alcohol Use: Negative  Drug Use: Negative  PCP: Maryana Brooks   Marital Status:        Review of Systems   Gastrointestinal: Positive for nausea. Negative for blood in stool and vomiting.   Genitourinary: Positive for flank pain. Negative for dysuria.        + groin pain   Musculoskeletal: Positive for back pain.   All other systems reviewed and are negative.    Physical Exam     Patient Vitals for the past 24 hrs:   BP Temp Temp src Pulse SpO2 Height Weight   05/07/19 1800 134/71 -- -- 56 95 % -- --   05/07/19 1614 140/79 97.9  F (36.6  C) Temporal 79 97 % 1.626 m (5' 4\") 68 kg (150 lb)     Physical Exam  General: Patient is alert and interactive when I enter the room. Appears uncomfortable.   Head:  The scalp, face, and head appear normal  Eyes:  The pupils are equal, round, and reactive to light    Conjunctivae and sclerae are normal  ENT:    External acoustic canals are normal    The oropharynx is normal without erythema.     Uvula is in the midline  Neck:  Normal range of motion  CV:  Regular rate. S1/S2. No murmurs.   Resp:  Lungs are clear without wheezes or rales. No distress  GI:  Abdomen is soft, no rigidity, guarding, or rebound    No distension. Left lower quadrant and left flank pain.   MS:  Normal tone. Joints grossly normal without effusions.     No asymmetric leg swelling, calf or thigh tenderness.      Normal motor assessment of all extremities.    Left CVA pain.   Skin:  No rash or lesions noted. Normal capillary refill noted  Neuro:  Speech is normal and fluent. Face is symmetric.     Moving all extremities well.   Psych:  Awake. Alert.  Normal affect.  Appropriate interactions.  Lymph: No anterior cervical lymphadenopathy noted    Emergency Department Course "     Imaging:  CT Abdomen Pelvis w/o Contrast  No acute process demonstrated in the abdomen and pelvis.  DEBBY CUNHA MD  Reading per radiology    Laboratory:  UA: glucose 500 A() bacteria few (A) HPF 2 (H) o/w WNL  CBC: WBC 8.6, HGB 12.3,   BMP: glucose 182 (H) Ca 8.4 (L) o/w WNL (Creatinine 0.90)    Interventions:  1656: Dilaudid 1 mg IV  2002: Dilaudid 1 mg IV  2002: Pink lady enema 286 mL per rectum      Emergency Department Course:  1627 Nursing notes and vitals reviewed.  1640 I performed an exam of the patient as documented above. IV was inserted and blood was drawn for laboratory testing, results above.  1659 The patient provided a urine sample here in the emergency department. This was sent for laboratory testing, findings above.  1705 The patient was sent for a CT while in the emergency department, results above.   1941 Patient reports persistence of her pain.   2107 Patient reports improvement of pain after good output. I personally reviewed the imaging and laboratory results with the patient and answered all related questions prior to discharge, anticipatory guidance given.    Impression & Plan      Medical Decision Making:  Lynn Thompson is a 54 year old female who presents with left flank pain.  Associated symptoms include back pain.   A broad differential diagnosis was considered including diverticulitis, ureterolithiasis, tumor, colitis, cholecystitis, aneurysm, dissection, hydronephrosis, pneumonia, rib fracture, UTI, pyelonephritis amongst many other etiologies.  I doubt PE given lack of dyspnea nor pain with deep inspirations. The workup in the ED is at this point negative.  No etiology for the patients pain is found at this point and my suspicion of an intraabdominal or intrathoracic catastrophe or other worrisome etiology is very low. She feels better after stooling and enema here which suggest there may be some component of constipation for her pain. I will not therefore admit for  serial exams and further workup.  Patient is hemodynamically stable in ED. Plan is home with flank pain recheck by primary care physician or return to ED at that time.  Return for fevers greater than 102, increasing pain, other new symptoms develop.  Flank pain handout given.  Questions were answered.    Diagnosis:    ICD-10-CM   1. Flank pain R10.9     Disposition: Discharged home in stable condition.     Discharge Medications:  polyethylene glycol powder  Commonly known as:  MIRALAX  Replaces:  polyethylene glycol packet      Dose:  1 capful  Take 17 g (1 capful) by mouth 4 times daily , dissolved in 8 ounces of water.  Until you have large amounts of loose watery stool, then decrease frequency of miralax to one capful twice a day.  Quantity:  2040 g  Refills:  0       Scribe Disclosure:  IVinny, am serving as a scribe at 4:51 PM on 5/7/2019 to document services personally performed by Adelfo Sanon MD based on my observations and the provider's statements to me.    [facility] EMERGENCY DEPARTMENT       Adelfo Sanon MD  05/07/19 3934

## 2019-05-07 NOTE — ED AVS SNAPSHOT
[facility] Emergency Department  [address]  [facility] [address]  Phone:  [phone]  Fax:  [phone]                                    Lynn Thompson   MRN: [id number]    Department:  [facility] Emergency Department   Date of Visit:  5/7/2019           After Visit Summary Signature Page    I have received my discharge instructions, and my questions have been answered. I have discussed any challenges I see with this plan with the nurse or doctor.    ..........................................................................................................................................  Patient/Patient Representative Signature      ..........................................................................................................................................  Patient Representative Print Name and Relationship to Patient    ..................................................               ................................................  Date                                   Time    ..........................................................................................................................................  Reviewed by Signature/Title    ...................................................              ..............................................  Date                                               Time          22EPIC Rev 08/18

## 2019-05-07 NOTE — ED TRIAGE NOTES
Pt presents with L flank pain into groin starting yesterday morning. Bladder infection 10 days ago, last abx on Saturday. Flank pain worsening now, +nausea, no hematuria, hx of kidney stones. ABCs intact.

## 2019-05-10 ENCOUNTER — TELEPHONE (OUTPATIENT)
Dept: FAMILY MEDICINE | Facility: CLINIC | Age: 54
End: 2019-05-10

## 2019-05-10 NOTE — TELEPHONE ENCOUNTER
Prior Authorization Retail Medication Request    Medication/Dose: ranitidine (ZANTAC) 150 MG tablet  ICD code (if different than what is on RX):    Previously Tried and Failed:  omeprazole (PRILOSEC) 40 MG DR capsule, PRILOSEC 40 MG OR CPDR, NEXIUM 40 MG OR CPDR    Rationale:  Other medications not effective    Insurance Name:  [employer]  Insurance ID:  [id number]      Pharmacy Information (if different than what is on RX)  Name:  [id number] [address] [address]  Phone:  [phone]    Edy DHILLON

## 2019-05-13 DIAGNOSIS — E89.1 POST-PANCREATECTOMY DIABETES (H): ICD-10-CM

## 2019-05-13 DIAGNOSIS — E13.9 POST-PANCREATECTOMY DIABETES (H): ICD-10-CM

## 2019-05-13 DIAGNOSIS — K86.89 PANCREATIC INSUFFICIENCY: Primary | ICD-10-CM

## 2019-05-13 DIAGNOSIS — Z90.410 POST-PANCREATECTOMY DIABETES (H): ICD-10-CM

## 2019-05-14 ENCOUNTER — TRANSFERRED RECORDS (OUTPATIENT)
Dept: HEALTH INFORMATION MANAGEMENT | Facility: CLINIC | Age: 54
End: 2019-05-14

## 2019-05-14 NOTE — TELEPHONE ENCOUNTER
PA Initiation    Medication: ranitidine (ZANTAC) 150 MG tablet- INITIATED  Insurance Company: [email or web address] - Phone [phone] Fax [phone]  Pharmacy Filling the Rx: [id number] [address] [address]  Filling Pharmacy Phone: [phone]  Filling Pharmacy Fax:    Start Date: 5/14/2019

## 2019-05-15 NOTE — TELEPHONE ENCOUNTER
Prior Authorization Approval    Authorization Effective Date: 5/10/2019  Authorization Expiration Date: 5/10/2020  Medication: ranitidine (ZANTAC) 150 MG tablet- APPROVED  Approved Dose/Quantity:   Reference #: [id number]   Insurance Company: [employer] - Phone [phone] Fax [phone]  Expected CoPay:       CoPay Card Available:      Foundation Assistance Needed:    Which Pharmacy is filling the prescription (Not needed for infusion/clinic administered): [id number] - [place], [place] - [address] [address]  Pharmacy Notified: Yes  Patient Notified: Yes

## 2019-05-16 ENCOUNTER — OFFICE VISIT (OUTPATIENT)
Dept: TRANSPLANT | Facility: CLINIC | Age: 54
End: 2019-05-16
Attending: PEDIATRICS
Payer: COMMERCIAL

## 2019-05-16 VITALS
SYSTOLIC BLOOD PRESSURE: 135 MMHG | TEMPERATURE: 97.5 F | WEIGHT: 69.7 LBS | BODY MASS INDEX: 11.9 KG/M2 | HEART RATE: 69 BPM | DIASTOLIC BLOOD PRESSURE: 80 MMHG | HEIGHT: 64 IN | OXYGEN SATURATION: 99 %

## 2019-05-16 DIAGNOSIS — E89.1 POST-PANCREATECTOMY DIABETES (H): Primary | ICD-10-CM

## 2019-05-16 DIAGNOSIS — K86.89 PANCREATIC INSUFFICIENCY: ICD-10-CM

## 2019-05-16 DIAGNOSIS — E89.1 POST-PANCREATECTOMY DIABETES (H): ICD-10-CM

## 2019-05-16 DIAGNOSIS — Z90.410 POST-PANCREATECTOMY DIABETES (H): ICD-10-CM

## 2019-05-16 DIAGNOSIS — E13.9 POST-PANCREATECTOMY DIABETES (H): ICD-10-CM

## 2019-05-16 DIAGNOSIS — Z90.410 POST-PANCREATECTOMY DIABETES (H): Primary | ICD-10-CM

## 2019-05-16 DIAGNOSIS — E13.9 POST-PANCREATECTOMY DIABETES (H): Primary | ICD-10-CM

## 2019-05-16 LAB
ALBUMIN SERPL-MCNC: 3.4 G/DL (ref 3.4–5)
ALP SERPL-CCNC: 127 U/L (ref 40–150)
ALT SERPL W P-5'-P-CCNC: 47 U/L (ref 0–50)
ANION GAP SERPL CALCULATED.3IONS-SCNC: 6 MMOL/L (ref 3–14)
AST SERPL W P-5'-P-CCNC: 27 U/L (ref 0–45)
BASOPHILS # BLD AUTO: 0.1 10E9/L (ref 0–0.2)
BASOPHILS NFR BLD AUTO: 1.7 %
BILIRUB SERPL-MCNC: 0.3 MG/DL (ref 0.2–1.3)
BUN SERPL-MCNC: 16 MG/DL (ref 7–30)
C PEPTIDE SERPL-MCNC: 0.7 NG/ML (ref 0.9–6.9)
C PEPTIDE SERPL-MCNC: 1.5 NG/ML (ref 0.9–6.9)
C PEPTIDE SERPL-MCNC: 1.8 NG/ML (ref 0.9–6.9)
CALCIUM SERPL-MCNC: 8.8 MG/DL (ref 8.5–10.1)
CHLORIDE SERPL-SCNC: 106 MMOL/L (ref 94–109)
CO2 SERPL-SCNC: 29 MMOL/L (ref 20–32)
CREAT SERPL-MCNC: 0.77 MG/DL (ref 0.52–1.04)
DIFFERENTIAL METHOD BLD: ABNORMAL
EOSINOPHIL # BLD AUTO: 0.2 10E9/L (ref 0–0.7)
EOSINOPHIL NFR BLD AUTO: 4.1 %
ERYTHROCYTE [DISTWIDTH] IN BLOOD BY AUTOMATED COUNT: 14.2 % (ref 10–15)
FERRITIN SERPL-MCNC: 79 NG/ML (ref 8–252)
GFR SERPL CREATININE-BSD FRML MDRD: 87 ML/MIN/{1.73_M2}
GLUCOSE SERPL-MCNC: 146 MG/DL (ref 70–99)
GLUCOSE SERPL-MCNC: 275 MG/DL (ref 70–99)
GLUCOSE SERPL-MCNC: 346 MG/DL (ref 70–99)
HBA1C MFR BLD: 8.5 % (ref 0–5.6)
HCT VFR BLD AUTO: 35.2 % (ref 35–47)
HGB BLD-MCNC: 11.2 G/DL (ref 11.7–15.7)
IMM GRANULOCYTES # BLD: 0 10E9/L (ref 0–0.4)
IMM GRANULOCYTES NFR BLD: 0.2 %
IRON SATN MFR SERPL: 28 % (ref 15–46)
IRON SERPL-MCNC: 73 UG/DL (ref 35–180)
LYMPHOCYTES # BLD AUTO: 2.1 10E9/L (ref 0.8–5.3)
LYMPHOCYTES NFR BLD AUTO: 39.2 %
MCH RBC QN AUTO: 29.9 PG (ref 26.5–33)
MCHC RBC AUTO-ENTMCNC: 31.8 G/DL (ref 31.5–36.5)
MCV RBC AUTO: 94 FL (ref 78–100)
MONOCYTES # BLD AUTO: 0.7 10E9/L (ref 0–1.3)
MONOCYTES NFR BLD AUTO: 12.6 %
NEUTROPHILS # BLD AUTO: 2.3 10E9/L (ref 1.6–8.3)
NEUTROPHILS NFR BLD AUTO: 42.2 %
NRBC # BLD AUTO: 0 10*3/UL
NRBC BLD AUTO-RTO: 0 /100
PLATELET # BLD AUTO: 320 10E9/L (ref 150–450)
POTASSIUM SERPL-SCNC: 3.5 MMOL/L (ref 3.4–5.3)
PREALB SERPL IA-MCNC: 18 MG/DL (ref 15–45)
PROT SERPL-MCNC: 6.4 G/DL (ref 6.8–8.8)
RBC # BLD AUTO: 3.75 10E12/L (ref 3.8–5.2)
SODIUM SERPL-SCNC: 142 MMOL/L (ref 133–144)
TIBC SERPL-MCNC: 264 UG/DL (ref 240–430)
VIT B12 SERPL-MCNC: 252 PG/ML (ref 193–986)
WBC # BLD AUTO: 5.4 10E9/L (ref 4–11)

## 2019-05-16 PROCEDURE — 84446 ASSAY OF VITAMIN E: CPT | Performed by: PEDIATRICS

## 2019-05-16 PROCEDURE — 82306 VITAMIN D 25 HYDROXY: CPT | Performed by: PEDIATRICS

## 2019-05-16 PROCEDURE — 84630 ASSAY OF ZINC: CPT | Performed by: PEDIATRICS

## 2019-05-16 PROCEDURE — 84590 ASSAY OF VITAMIN A: CPT | Performed by: PEDIATRICS

## 2019-05-16 PROCEDURE — 82947 ASSAY GLUCOSE BLOOD QUANT: CPT | Performed by: PEDIATRICS

## 2019-05-16 PROCEDURE — 85025 COMPLETE CBC W/AUTO DIFF WBC: CPT | Performed by: PEDIATRICS

## 2019-05-16 PROCEDURE — 80053 COMPREHEN METABOLIC PANEL: CPT | Performed by: PEDIATRICS

## 2019-05-16 PROCEDURE — 83550 IRON BINDING TEST: CPT | Performed by: PEDIATRICS

## 2019-05-16 PROCEDURE — 82947 ASSAY GLUCOSE BLOOD QUANT: CPT | Mod: 91 | Performed by: PEDIATRICS

## 2019-05-16 PROCEDURE — 82728 ASSAY OF FERRITIN: CPT | Performed by: PEDIATRICS

## 2019-05-16 PROCEDURE — 36415 COLL VENOUS BLD VENIPUNCTURE: CPT | Performed by: PEDIATRICS

## 2019-05-16 PROCEDURE — 84134 ASSAY OF PREALBUMIN: CPT | Performed by: PEDIATRICS

## 2019-05-16 PROCEDURE — 82607 VITAMIN B-12: CPT | Performed by: PEDIATRICS

## 2019-05-16 PROCEDURE — G0463 HOSPITAL OUTPT CLINIC VISIT: HCPCS | Mod: ZF

## 2019-05-16 PROCEDURE — 84681 ASSAY OF C-PEPTIDE: CPT | Mod: 59 | Performed by: PEDIATRICS

## 2019-05-16 PROCEDURE — 82542 COL CHROMOTOGRAPHY QUAL/QUAN: CPT | Performed by: PEDIATRICS

## 2019-05-16 PROCEDURE — 84681 ASSAY OF C-PEPTIDE: CPT | Performed by: PEDIATRICS

## 2019-05-16 PROCEDURE — 83036 HEMOGLOBIN GLYCOSYLATED A1C: CPT | Performed by: PEDIATRICS

## 2019-05-16 PROCEDURE — 83540 ASSAY OF IRON: CPT | Performed by: PEDIATRICS

## 2019-05-16 RX ORDER — PROCHLORPERAZINE 25 MG/1
1 SUPPOSITORY RECTAL
Qty: 1 EACH | Refills: 3 | Status: SHIPPED | OUTPATIENT
Start: 2019-05-16 | End: 2019-10-31

## 2019-05-16 RX ORDER — PROCHLORPERAZINE 25 MG/1
1 SUPPOSITORY RECTAL ONCE
Qty: 1 DEVICE | Refills: 0 | Status: SHIPPED | OUTPATIENT
Start: 2019-05-16 | End: 2019-07-09

## 2019-05-16 RX ORDER — PROCHLORPERAZINE 25 MG/1
1 SUPPOSITORY RECTAL
Qty: 3 EACH | Refills: 11 | Status: SHIPPED | OUTPATIENT
Start: 2019-05-16 | End: 2019-10-31

## 2019-05-16 ASSESSMENT — MIFFLIN-ST. JEOR: SCORE: 891.16

## 2019-05-16 ASSESSMENT — PAIN SCALES - GENERAL: PAINLEVEL: NO PAIN (0)

## 2019-05-16 NOTE — LETTER
5/16/2019       RE: Lynn Thompson  [address] [facility] [place] [address]     Dear Colleague,    Thank you for referring your patient, Lynn Thompson, to the [facility] SOLID ORGAN TRANSPLANT at [facility]. Please see a copy of my visit note below.    [facility]  Islet Autotransplant, Diabetes Follow Up    Problem List:  Patient Active Problem List   Diagnosis     Iron deficiency anemia secondary to inadequate dietary iron intake     Gastric bypass status for obesity     Health Care Home     Chronic pain syndrome     Exocrine pancreatic insufficiency     Asplenia     BLU (obstructive sleep apnea)     S/P exploratory laparotomy     Dysthymia     Anxiety     Thyroid nodule     Acquired hypothyroidism     Post-pancreatectomy diabetes (H)     E coli bacteremia     Antibiotic-associated diarrhea     Elevated LFTs       HPI:  Lynn is a 54 year old female here for follow up o total pancreatectomy, islet cell autotransplant, splenectomy, liver biopsy (needle core), choledochoduodenostomy, feeding jejunostomy performed on 5/10/2013.  At the time of the procedure, the patient received 178,100 IEQ, or 3,209 IEQ/kg body weight. She has had two subsequent exploratory laparatomy for small bowel obstruction. Other notable endocrine history includes a complex cystic nodule in mid right thyroid lobe with 1 cm maximum diameter (saw Dr Adorno in 11/2016) which needs annual follow up U/S in Nov 2017, and mild hypothyroidism followed by PCP.  She had an episode of cholangitis and was hospitalized for 4 days 8/24/17 (fevers, RUQ pain, + Ecoli blood cx).    She was on NO insulin at her 1 year, 2 year, 3 year, 4 year transplant anniversaries and restart insulin just after 4 years post-tx, insulin restart date of  6/6/2017.  She is continuing to wean narcotics, on a suboxone wean, now on a 1/8 patch (Suboxone 1- 0.5 mg film) daily, with no other narcotics.      At today's visit, overall Lynn is doing well overall.      1)  Diabetes:  Lynn continues to have partial islet graft function.  However, she had a significant increase in A1c this visit-- her highest ever.  Interval events that may be contributing to this include-- stopped suboxone but had ~2 months of withdrawal symptoms with this; social stressors (father in law went to hospice, sister in law ); seasonal depression that has been worse.    Diabetes history:  Current insulin regimen:  Lantus 5 units per day  Novolog 0.5 units per 20g, and correction scale of 0.5 u per 100 >150   TDD has been around 8u/day    We were unable to get the Bre covered under her insurance, but she is very interested in a CGM since her A1c is up.    Recent A1c:   Lab Results   Component Value Date    A1C 8.5 2019    A1C 6.9 2019    A1C 7.5 2018    A1C 6.3 2017    A1C 6.5 2017       Hypoglycemia history:  None recent.  The patient has had NO episodes of severe hypoglycemia (seizure, coma, or neuroglycopenic symptoms severe enough to require assistance from another person).  Blood sugars were reviewed from the patient records and/or the meter download.    Average B mg/dL SD 40 mg/dL  Number of blood sugar checks per day 2.4 in this meter-- needs strips for 4 checks per day.     2)  GI symptoms/pain:  Struggles w/ GI issues still.  Did not respond to tx for SIBO. Her stool regimen was reduced for diarrhea, but then she developed quite severe constipation that was bad enough to warrant ED visit and enema. She is reintroducing miralax and has follow up w GI      Review of systems:  Review Of Systems  Skin: negative  Eyes: negative  Ears/Nose/Throat: negative  Respiratory: No shortness of breath, dyspnea on exertion, cough, or hemoptysis  Cardiovascular: negative  Gastrointestinal: as above  Genitourinary: negative  Musculoskeletal: negative  Neurologic: negative  Psychiatric:  anxiety/depression  Hematologic/Lymphatic/Immunologic: negative  Endocrine: as above    Past Medical and Surgical History:  Past Medical History:   Diagnosis Date     Abdominal pain, epigastric , ongoing; goes to pain clinic    probable recurrent spasm sphincter of Oddi causing biliary colic pains      Benign paroxysmal positional vertigo     occ.      Calculus of kidney 5/05    x1 on L side passed, several stones.  Has been tested for oxalate.     Chronic abdominal pain 2013     Chronic pancreatitis (H) 2013     Depressive disorder, not elsewhere classified     also occ panic spells     Diabetes (H)     post pancreatectomy     Dyspepsia and other specified disorders of function of stomach     H. pylori   treated     Headache(784.0)     still periodic HA's ;  often 5X/week     Hypertension 16    Stress related     Iron deficiency anemia secondary to inadequate dietary iron intake     relates to gastric bypass     Other chronic pain      Post-pancreatectomy diabetes (H) 2013     Pyelonephritis, unspecified , 10/8    L side     Regional enteritis of unspecified site     inacive/remission     Sleep apnea     uses Cpap     Spasm of sphincter of Oddi     ERCP with Stent of [place] Fernandez [place] [facility] [place]     Spasm of sphincter of Oddi     surgical + endoscopic stenting of pancreatic duct [place] [facility] [place] 06     Thyroid nodule 2016     Ureteral calculus 10/2/2012     Vaccination not carried out      Past Surgical History:   Procedure Laterality Date     APPENDECTOMY       C NONSPECIFIC PROCEDURE      R bunion     C NONSPECIFIC PROCEDURE      T & A     C NONSPECIFIC PROCEDURE      partial ileum resection     C NONSPECIFIC PROCEDURE      R ovarian cyst     C NONSPECIFIC PROCEDURE           C NONSPECIFIC PROCEDURE      GALL BLADDER     C NONSPECIFIC PROCEDURE      CBD stent; Dr. Presley     C NONSPECIFIC PROCEDURE   &      colonoscopy     C NONSPECIFIC PROCEDURE  4/03    Gastric bypass     CHOLECYSTECTOMY       COLONOSCOPY       COMBINED CYSTOSCOPY, RETROGRADES, URETEROSCOPY, LASER HOLMIUM LITHOTRIPSY URETER(S), INSERT STENT Right 3/23/2015    Procedure: COMBINED CYSTOSCOPY, RETROGRADES, URETEROSCOPY, LASER HOLMIUM LITHOTRIPSY URETER(S), INSERT STENT;  Surgeon: Kennedi Aldana MD;  [facility]     COMBINED CYSTOSCOPY, RETROGRADES, URETEROSCOPY, LASER HOLMIUM LITHOTRIPSY URETER(S), INSERT STENT Right 4/20/2015    Procedure: COMBINED CYSTOSCOPY, RETROGRADES, URETEROSCOPY, LASER HOLMIUM LITHOTRIPSY URETER(S), INSERT STENT;  Surgeon: Kennedi Aldana MD;  [facility]     COSMETIC SURGERY  6/24/2002    Tummy tuck     CYSTOSCOPY, RETROGRADES, INSERT STENT URETER(S), COMBINED  10/2/2012    Procedure: COMBINED CYSTOSCOPY, RETROGRADES, INSERT STENT URETER(S);  COMBINED CYSTOSCOPY,  , INSERT LEFT STENT URETER;  Surgeon: Johny Baez MD;  Location: [facility] OR     ENT SURGERY       ESOPHAGOSCOPY, GASTROSCOPY, DUODENOSCOPY (EGD), COMBINED N/A 1/24/2018    Procedure: COMBINED ESOPHAGOSCOPY, GASTROSCOPY, DUODENOSCOPY (EGD);  ESOPHAGOSCOPY, GASTROSCOPY, DUODENOSCOPY (EGD)    ;  Surgeon: Tamir Rodgers MD;  Location:  GI     EXTRACORPOREAL SHOCK WAVE LITHOTRIPSY (ESWL)  10/16/2012    Procedure: EXTRACORPOREAL SHOCK WAVE LITHOTRIPSY (ESWL);  left EXTRACORPOREAL SHOCK WAVE LITHOTRIPSY (ESWL) ;  Surgeon: Johny Baez MD;  Location: [facility] OR     GI SURGERY  12/29/2015    Small bowel obstruction     HC LAP,LYSIS OF ADHESIONS       HERNIA REPAIR  2/2015     LAPAROSCOPIC LYSIS ADHESIONS N/A 2/20/2015    Procedure: LAPAROSCOPIC LYSIS ADHESIONS;  Surgeon: Aaron Early MD;  [facility]     LAPAROSCOPIC LYSIS ADHESIONS N/A 12/29/2015    Procedure: LAPAROSCOPIC LYSIS ADHESIONS;  Surgeon: Aaron Early MD;  [facility]     PANCREATECTOMY, TRANSPLANT AUTO ISLET CELL, COMBINED  5/10/2013    Procedure: COMBINED  "PANCREATECTOMY, TRANSPLANT AUTO ISLET CELL;  Pancreatectomy, Auto Islet Cell Transplant   hernia repair, jejunostomy tube and liver biopsies with Anesthesia General with block;  Surgeon: Aaron Early MD;  [facility]     TRANSPLANT  5/10/13       Family History:  New changes since last visit:  none  Family History   Problem Relation Age of Onset     Family History Negative Mother      Respiratory Father         COPD;  at 69     Genitourinary Problems Father         kidney stones     Substance Abuse Father      Depression Father      Asthma Father      Heart Disease Paternal Grandfather         M.I.     Coronary Artery Disease Paternal Grandfather      Hyperlipidemia Paternal Grandfather      Genitourinary Problems Brother         multiple brothers with kidney stones     Gastrointestinal Disease Maternal Grandmother         undiagnosed 'gut' issues     Coronary Artery Disease Maternal Grandfather      Hyperlipidemia Maternal Grandfather      Cerebrovascular Disease Paternal Grandmother         At the age of 103     Anxiety Disorder Paternal Grandmother      Osteoporosis Paternal Grandmother      Anxiety Disorder Son      Anxiety Disorder Daughter      Asthma Daughter        Social History:  Social History     Social History Narrative     Not on file     Does not work currently.  Social stressors     Physical Exam:  Vitals: /80   Pulse 69   Temp 97.5  F (36.4  C) (Oral)   Ht 1.626 m (5' 4\")   Wt 31.6 kg (69 lb 11.2 oz)   LMP 2013   SpO2 99%   BMI 11.96 kg/m     BMI= Body mass index is 11.96 kg/m .  General:  Well-appearing, NAD  Psych:  Communicative, with normal affect         Assessment:  1.  Post pancreatectomy diabetes mellitus, s/p total pancreatectomy and islet autotransplant.    Lynn is a 54 year old with history of chronic pancreatitis who is s/p total pancreatectomy and islet autotransplant.  She was insulin independent until 2017 (just after 4 years post-tx) and now has " partial islet function.    Main concern today is that HbA1c has increased.  This is also reflected in higher glucose excursion on MMTT.  Since Lynn is very insulin sensitive historically, started with small dose change-- up to 6 on glargine and increased aspart to 0.5 per 15g carb.    We discussed getting a Dexcom G6 given worsening glycemic control.  I think she could really benefit from this, and she would very much like to move ahead so Rx was submitted to speciality pharmacy.      Plan:  1.  Changes to current diabetes regimen:  Patient Instructions     1)  Lantus: Increase to 6 unit Lantus daily.      2)  Novolog coverage: same                     What your blood glucose is before eating:   How much you plan to eat: 80- 150 mg/dL 151- 250 mg/dL 251 to 350 mg/dL   Less than 15 grams carb 0 0.5 1   16-30 grams carb 0.5 1 1.5   31-45 grams carb 1 1.5 2   46- 60 grams carb 1.5 2 2.5   More than 60 grams carb 2 2.5 3      Let me know if you are still seeing BGs 180-200s range on your meter.    Follow Up:  Sept 5 at 11:20am      2.  Frequency of blood sugar checks:  premeal and bedtime, and as needed for hypoglycemia (6 strip per day).    3.  Continue routine follow up for autoislet transplant patients:  Mixed meal test (6 mL/kg BoostHP to max of 360 mL) at 3 months, 6 months, and once a year post transplant.  Hemoglobin A1c levels at these time points and quarterly.  4.  Other issues addressed today:  As above    Follow up:  As above        Contact me for questions at [phone] or [phone].  Emergency number to reach pediatric endocrinology after hours is [phone].        Adriana Robles MD  , Pediatric Endocrinology and Diabetes  [facility] [place]  [facility]  I spent 25  minutes face to face with Lynn, with more than 50% of that time counseling on plan of care.

## 2019-05-16 NOTE — PROGRESS NOTES
[facility]  Islet Autotransplant, Diabetes Follow Up    Problem List:  Patient Active Problem List   Diagnosis     Iron deficiency anemia secondary to inadequate dietary iron intake     Gastric bypass status for obesity     Health Care Home     Chronic pain syndrome     Exocrine pancreatic insufficiency     Asplenia     BLU (obstructive sleep apnea)     S/P exploratory laparotomy     Dysthymia     Anxiety     Thyroid nodule     Acquired hypothyroidism     Post-pancreatectomy diabetes (H)     E coli bacteremia     Antibiotic-associated diarrhea     Elevated LFTs       HPI:  Lynn is a 54 year old female here for follow up o total pancreatectomy, islet cell autotransplant, splenectomy, liver biopsy (needle core), choledochoduodenostomy, feeding jejunostomy performed on 5/10/2013.  At the time of the procedure, the patient received 178,100 IEQ, or 3,209 IEQ/kg body weight. She has had two subsequent exploratory laparatomy for small bowel obstruction. Other notable endocrine history includes a complex cystic nodule in mid right thyroid lobe with 1 cm maximum diameter (saw Dr Adorno in 11/2016) which needs annual follow up U/S in Nov 2017, and mild hypothyroidism followed by PCP.  She had an episode of cholangitis and was hospitalized for 4 days 8/24/17 (fevers, RUQ pain, + Ecoli blood cx).    She was on NO insulin at her 1 year, 2 year, 3 year, 4 year transplant anniversaries and restart insulin just after 4 years post-tx, insulin restart date of  6/6/2017.  She is continuing to wean narcotics, on a suboxone wean, now on a 1/8 patch (Suboxone 1- 0.5 mg film) daily, with no other narcotics.     At today's visit, overall Lynn is doing well overall.      1)  Diabetes:  Lynn continues to have partial islet graft function.  However, she had a significant increase in A1c this visit-- her highest ever.  Interval events that may be contributing to this include-- stopped suboxone but  had ~2 months of withdrawal symptoms with this; social stressors (father in law went to hospice, sister in law ); seasonal depression that has been worse.    Diabetes history:  Current insulin regimen:  Lantus 5 units per day  Novolog 0.5 units per 20g, and correction scale of 0.5 u per 100 >150   TDD has been around 8u/day    We were unable to get the Bre covered under her insurance, but she is very interested in a CGM since her A1c is up.    Recent A1c:   Lab Results   Component Value Date    A1C 8.5 2019    A1C 6.9 2019    A1C 7.5 2018    A1C 6.3 2017    A1C 6.5 2017       Hypoglycemia history:  None recent.  The patient has had NO episodes of severe hypoglycemia (seizure, coma, or neuroglycopenic symptoms severe enough to require assistance from another person).  Blood sugars were reviewed from the patient records and/or the meter download.    Average B mg/dL SD 40 mg/dL  Number of blood sugar checks per day 2.4 in this meter-- needs strips for 4 checks per day.     2)  GI symptoms/pain:  Struggles w/ GI issues still.  Did not respond to tx for SIBO. Her stool regimen was reduced for diarrhea, but then she developed quite severe constipation that was bad enough to warrant ED visit and enema. She is reintroducing miralax and has follow up w GI      Review of systems:  Review Of Systems  Skin: negative  Eyes: negative  Ears/Nose/Throat: negative  Respiratory: No shortness of breath, dyspnea on exertion, cough, or hemoptysis  Cardiovascular: negative  Gastrointestinal: as above  Genitourinary: negative  Musculoskeletal: negative  Neurologic: negative  Psychiatric: anxiety/depression  Hematologic/Lymphatic/Immunologic: negative  Endocrine: as above    Past Medical and Surgical History:  Past Medical History:   Diagnosis Date     Abdominal pain, epigastric , ongoing; goes to pain clinic    probable recurrent spasm sphincter of Oddi causing biliary colic pains       Benign paroxysmal positional vertigo     occ.      Calculus of kidney 5/05    x1 on L side passed, several stones.  Has been tested for oxalate.     Chronic abdominal pain 2013     Chronic pancreatitis (H) 2013     Depressive disorder, not elsewhere classified     also occ panic spells     Diabetes (H)     post pancreatectomy     Dyspepsia and other specified disorders of function of stomach     H. pylori   treated     Headache(784.0)     still periodic HA's ;  often 5X/week     Hypertension 16    Stress related     Iron deficiency anemia secondary to inadequate dietary iron intake     relates to gastric bypass     Other chronic pain      Post-pancreatectomy diabetes (H) 2013     Pyelonephritis, unspecified , 10/8    L side     Regional enteritis of unspecified site     inacive/remission     Sleep apnea     uses Cpap     Spasm of sphincter of Oddi     ERCP with Stent of CBD by Fernandez @ [facility] [place] with sx relief     Spasm of sphincter of Oddi     surgical + endoscopic stenting of pancreatic duct @ [facility] [place] 06     Thyroid nodule 2016     Ureteral calculus 10/2/2012     Vaccination not carried out      Past Surgical History:   Procedure Laterality Date     APPENDECTOMY       C NONSPECIFIC PROCEDURE      R bunion     C NONSPECIFIC PROCEDURE      T & A     C NONSPECIFIC PROCEDURE      partial ileum resection     C NONSPECIFIC PROCEDURE      R ovarian cyst     C NONSPECIFIC PROCEDURE           C NONSPECIFIC PROCEDURE      GALL BLADDER     C NONSPECIFIC PROCEDURE      CBD stent; Dr. Presley     C NONSPECIFIC PROCEDURE   &     colonoscopy     C NONSPECIFIC PROCEDURE      Gastric bypass     CHOLECYSTECTOMY       COLONOSCOPY       COMBINED CYSTOSCOPY, RETROGRADES, URETEROSCOPY, LASER HOLMIUM LITHOTRIPSY URETER(S), INSERT STENT Right 3/23/2015    Procedure: COMBINED CYSTOSCOPY, RETROGRADES, URETEROSCOPY, LASER HOLMIUM LITHOTRIPSY  URETER(S), INSERT STENT;  Surgeon: Kennedi Aldana MD;  [facility]     COMBINED CYSTOSCOPY, RETROGRADES, URETEROSCOPY, LASER HOLMIUM LITHOTRIPSY URETER(S), INSERT STENT Right 4/20/2015    Procedure: COMBINED CYSTOSCOPY, RETROGRADES, URETEROSCOPY, LASER HOLMIUM LITHOTRIPSY URETER(S), INSERT STENT;  Surgeon: Kennedi Aldana MD;  [facility]     COSMETIC SURGERY  6/24/2002    Tummy tuck     CYSTOSCOPY, RETROGRADES, INSERT STENT URETER(S), COMBINED  10/2/2012    Procedure: COMBINED CYSTOSCOPY, RETROGRADES, INSERT STENT URETER(S);  COMBINED CYSTOSCOPY,  , INSERT LEFT STENT URETER;  Surgeon: Johny Baez MD;  Location: [facility] OR     ENT SURGERY       ESOPHAGOSCOPY, GASTROSCOPY, DUODENOSCOPY (EGD), COMBINED N/A 1/24/2018    Procedure: COMBINED ESOPHAGOSCOPY, GASTROSCOPY, DUODENOSCOPY (EGD);  ESOPHAGOSCOPY, GASTROSCOPY, DUODENOSCOPY (EGD)    ;  Surgeon: Tamir Rodgers MD;  Location:  GI     EXTRACORPOREAL SHOCK WAVE LITHOTRIPSY (ESWL)  10/16/2012    Procedure: EXTRACORPOREAL SHOCK WAVE LITHOTRIPSY (ESWL);  left EXTRACORPOREAL SHOCK WAVE LITHOTRIPSY (ESWL) ;  Surgeon: Johny Baez MD;  Location: [facility] OR     GI SURGERY  12/29/2015    Small bowel obstruction     HC LAP,LYSIS OF ADHESIONS       HERNIA REPAIR  2/2015     LAPAROSCOPIC LYSIS ADHESIONS N/A 2/20/2015    Procedure: LAPAROSCOPIC LYSIS ADHESIONS;  Surgeon: Aaron Early MD;  [facility]     LAPAROSCOPIC LYSIS ADHESIONS N/A 12/29/2015    Procedure: LAPAROSCOPIC LYSIS ADHESIONS;  Surgeon: Aaron Early MD;  [facility]     PANCREATECTOMY, TRANSPLANT AUTO ISLET CELL, COMBINED  5/10/2013    Procedure: COMBINED PANCREATECTOMY, TRANSPLANT AUTO ISLET CELL;  Pancreatectomy, Auto Islet Cell Transplant   hernia repair, jejunostomy tube and liver biopsies with Anesthesia General with block;  Surgeon: Aaron Early MD;  [facility]     TRANSPLANT  5/10/13       Family History:  New changes since last visit:  none  Family  "History   Problem Relation Age of Onset     Family History Negative Mother      Respiratory Father         COPD;  at 69     Genitourinary Problems Father         kidney stones     Substance Abuse Father      Depression Father      Asthma Father      Heart Disease Paternal Grandfather         M.I.     Coronary Artery Disease Paternal Grandfather      Hyperlipidemia Paternal Grandfather      Genitourinary Problems Brother         multiple brothers with kidney stones     Gastrointestinal Disease Maternal Grandmother         undiagnosed 'gut' issues     Coronary Artery Disease Maternal Grandfather      Hyperlipidemia Maternal Grandfather      Cerebrovascular Disease Paternal Grandmother         At the age of 103     Anxiety Disorder Paternal Grandmother      Osteoporosis Paternal Grandmother      Anxiety Disorder Son      Anxiety Disorder Daughter      Asthma Daughter        Social History:  Social History     Social History Narrative     Not on file     Does not work currently.  Social stressors     Physical Exam:  Vitals: /80   Pulse 69   Temp 97.5  F (36.4  C) (Oral)   Ht 1.626 m (5' 4\")   Wt 31.6 kg (69 lb 11.2 oz)   LMP 2013   SpO2 99%   BMI 11.96 kg/m    BMI= Body mass index is 11.96 kg/m .  General:  Well-appearing, NAD  Psych:  Communicative, with normal affect         Assessment:  1.  Post pancreatectomy diabetes mellitus, s/p total pancreatectomy and islet autotransplant.    Lynn is a 54 year old with history of chronic pancreatitis who is s/p total pancreatectomy and islet autotransplant.  She was insulin independent until 2017 (just after 4 years post-tx) and now has partial islet function.    Main concern today is that HbA1c has increased.  This is also reflected in higher glucose excursion on MMTT.  Since Lynn is very insulin sensitive historically, started with small dose change-- up to 6 on glargine and increased aspart to 0.5 per 15g carb.    We discussed getting a " Dexcom G6 given worsening glycemic control.  I think she could really benefit from this, and she would very much like to move ahead so Rx was submitted to speciality pharmacy.      Plan:  1.  Changes to current diabetes regimen:  Patient Instructions     1)  Lantus: Increase to 6 unit Lantus daily.      2)  Novolog coverage: same                     What your blood glucose is before eating:   How much you plan to eat: 80- 150 mg/dL 151- 250 mg/dL 251 to 350 mg/dL   Less than 15 grams carb 0 0.5 1   16-30 grams carb 0.5 1 1.5   31-45 grams carb 1 1.5 2   46- 60 grams carb 1.5 2 2.5   More than 60 grams carb 2 2.5 3      Let me know if you are still seeing BGs 180-200s range on your meter.    Follow Up:  Sept 5 at 11:20am      2.  Frequency of blood sugar checks:  premeal and bedtime, and as needed for hypoglycemia (6 strip per day).    3.  Continue routine follow up for autoislet transplant patients:  Mixed meal test (6 mL/kg BoostHP to max of 360 mL) at 3 months, 6 months, and once a year post transplant.  Hemoglobin A1c levels at these time points and quarterly.  4.  Other issues addressed today:  As above    Follow up:  As above        Contact me for questions at [phone] or [phone].  Emergency number to reach pediatric endocrinology after hours is [phone].        Adriana Robles MD  , Pediatric Endocrinology and Diabetes  [facility] [place] [facility]  [facility]    I spent 25  minutes face to face with Lynn, with more than 50% of that time counseling on plan of care.

## 2019-05-16 NOTE — NURSING NOTE
"Chief Complaint   Patient presents with     RECHECK     follow up     Blood pressure 135/80, pulse 69, temperature 97.5  F (36.4  C), temperature source Oral, height 1.626 m (5' 4\"), weight 31.6 kg (69 lb 11.2 oz), last menstrual period 2013, SpO2 99 %, not currently breastfeeding.  Patient presents for a Boost Test.    Boost administered under five minutes, and lab draw times provided to lab.  No complications noted. Patient voiced understanding.    Lab draw times:837am and 937am  Weight in k.7kg    Rinku Kim CMA on 2019 at 7:38 AM    "

## 2019-05-16 NOTE — PATIENT INSTRUCTIONS
1)  Lantus: Increase to 6 unit Lantus daily.      2)  Novolog coverage: same                     What your blood glucose is before eating:   How much you plan to eat: 80- 150 mg/dL 151- 250 mg/dL 251 to 350 mg/dL   Less than 15 grams carb 0 0.5 1   16-30 grams carb 0.5 1 1.5   31-45 grams carb 1 1.5 2   46- 60 grams carb 1.5 2 2.5   More than 60 grams carb 2 2.5 3      Let me know if you are still seeing BGs 180-200s range on your meter.    Follow Up:  Sept 5 at 11:20am

## 2019-05-18 LAB — ZINC SERPL-MCNC: 60 UG/DL (ref 60–120)

## 2019-05-19 LAB
A-TOCOPHEROL VIT E SERPL-MCNC: 7.5 MG/L (ref 5.5–18)
ANNOTATION COMMENT IMP: NORMAL
BETA+GAMMA TOCOPHEROL SERPL-MCNC: 1.4 MG/L (ref 0–6)
RETINYL PALMITATE SERPL-MCNC: <0.02 MG/L (ref 0–0.1)
VIT A SERPL-MCNC: 0.39 MG/L (ref 0.3–1.2)

## 2019-05-20 ENCOUNTER — CARE COORDINATION (OUTPATIENT)
Dept: GASTROENTEROLOGY | Facility: CLINIC | Age: 54
End: 2019-05-20

## 2019-05-20 LAB
DEPRECATED CALCIDIOL+CALCIFEROL SERPL-MC: <46 UG/L (ref 20–75)
VITAMIN D2 SERPL-MCNC: <5 UG/L
VITAMIN D3 SERPL-MCNC: 41 UG/L

## 2019-05-20 NOTE — PROGRESS NOTES
Received and scanned records into Epic.     ALBERT [address] Dr. Owens, & Dr. Hodge  [facility]  [phone]

## 2019-05-22 LAB
A-LINOLENATE SERPL-SCNC: 73 NMOL/ML (ref 50–130)
AA SERPL-SCNC: 1009 NMOL/ML (ref 520–1490)
ARACHIDATE SERPL-SCNC: 30 NMOL/ML (ref 50–90)
CLINICAL BIOCHEMIST REVIEW: ABNORMAL
DHA SERPL-SCNC: 192 NMOL/ML (ref 30–250)
DOCOSAPENTAENATE W6 SERPL-SCNC: 32 NMOL/ML (ref 10–70)
DOCOSATETRAENOATE SERPL-SCNC: 42 NMOL/ML (ref 10–80)
DOCOSENOATE SERPL-SCNC: 8 NMOL/ML (ref 4–13)
DPA SERPL-SCNC: 118 NMOL/ML (ref 20–210)
EPA SERPL-SCNC: 131 NMOL/ML (ref 14–100)
FA SERPL-SCNC: 11.2 MMOL/L (ref 7.3–16.8)
G-LINOLENATE SERPL-SCNC: 72 NMOL/ML (ref 16–150)
HEXADECENOATE SERPL-SCNC: 59 NMOL/ML (ref 25–105)
HOMO-G LINOLENATE SERPL-SCNC: 172 NMOL/ML (ref 50–250)
LAURATE SERPL-SCNC: 26 NMOL/ML (ref 6–90)
LINOLEATE SERPL-SCNC: 3046 NMOL/ML (ref 2270–3850)
MEAD ACID SERPL-SCNC: 41 NMOL/ML (ref 7–30)
MONOUNSAT FA SERPL-SCNC: 2.6 MMOL/L (ref 1.3–5.8)
MYRISTATE SERPL-SCNC: 194 NMOL/ML (ref 30–450)
NERVONATE SERPL-SCNC: 110 NMOL/ML (ref 60–100)
OCTADECANOATE SERPL-SCNC: 732 NMOL/ML (ref 590–1170)
OLEATE SERPL-SCNC: 1654 NMOL/ML (ref 650–3500)
PALMITATE SERPL-SCNC: 2537 NMOL/ML (ref 1480–3730)
PALMITOLEATE SERPL-SCNC: 464 NMOL/ML (ref 110–1130)
POLYUNSAT FA SERPL-SCNC: 4.9 MMOL/L (ref 3.2–5.8)
SAT FA SERPL-SCNC: 3.7 MMOL/L (ref 2.5–5.5)
TRIENOATE/AA SERPL-SRTO: 0.04 {RATIO} (ref 0.01–0.04)
VACCENATE SERPL-SCNC: 257 NMOL/ML (ref 280–740)
W3 FA SERPL-SCNC: 0.5 MMOL/L (ref 0.2–0.5)
W6 FA SERPL-SCNC: 4.4 MMOL/L (ref 3–5.4)

## 2019-06-04 ENCOUNTER — TELEPHONE (OUTPATIENT)
Dept: FAMILY MEDICINE | Facility: CLINIC | Age: 54
End: 2019-06-04

## 2019-06-04 NOTE — TELEPHONE ENCOUNTER
Prior Authorization Retail Medication Request    Medication/Dose: omeprazole (PRILOSEC) 40 MG DR capsule  ICD code (if different than what is on RX):  Gastroesophageal reflux disease without esophagitis [K21.9]   Previously Tried and Failed:  PRILOSEC 40 MG OR CPDR,  NEXIUM 40 MG OR CPDR   Rationale:  Patient has new insurance and needs a new PA. Patient had a PA approved from 2019-2020 under old insurance.     COVERMYMEDS    KEY: [id number]  PATIENT LAST NAME: JAYDEN  : 1965      Pharmacy Information (if different than what is on RX)  Name:  [facility] #[id number] [place], [place] - [address]  Phone:  [phone]      Edy DHILLON

## 2019-06-05 NOTE — TELEPHONE ENCOUNTER
Central Prior Authorization Team   Phone: [phone]    PA Initiation    Medication: omeprazole (PRILOSEC) 40 MG DR capsule  Insurance Company: [facility] - Phone [phone] Fax [phone]  Pharmacy Filling the Rx: [facility][id number] [place] [address] [address]  Filling Pharmacy Phone: [phone]  Filling Pharmacy Fax: [phone]  Start Date: 6/5/2019

## 2019-06-06 NOTE — TELEPHONE ENCOUNTER
Prior Authorization Approval    Authorization Effective Date: 6/4/2019  Authorization Expiration Date: 6/4/2020  Medication: omeprazole (PRILOSEC) 40 MG DR-APPROVED  Approved Dose/Quantity:    Reference #:     Insurance Company: [employer] [place] - Phone [phone] Fax [phone]  Expected CoPay:       CoPay Card Available:      Foundation Assistance Needed:    Which Pharmacy is filling the prescription (Not needed for infusion/clinic administered): [facility] #[id number] [place], [place] - [address] [address]  Pharmacy Notified: Yes  Patient Notified: Yes  **Instructed pharmacy to notify patient when script is ready to /ship.**

## 2019-06-12 ENCOUNTER — OFFICE VISIT (OUTPATIENT)
Dept: GASTROENTEROLOGY | Facility: CLINIC | Age: 54
End: 2019-06-12
Payer: COMMERCIAL

## 2019-06-12 VITALS
HEART RATE: 91 BPM | BODY MASS INDEX: 26.31 KG/M2 | OXYGEN SATURATION: 99 % | HEIGHT: 64 IN | DIASTOLIC BLOOD PRESSURE: 91 MMHG | WEIGHT: 154.1 LBS | SYSTOLIC BLOOD PRESSURE: 150 MMHG

## 2019-06-12 DIAGNOSIS — K59.01 SLOW TRANSIT CONSTIPATION: Primary | ICD-10-CM

## 2019-06-12 ASSESSMENT — PAIN SCALES - GENERAL: PAINLEVEL: MILD PAIN (2)

## 2019-06-12 ASSESSMENT — MIFFLIN-ST. JEOR: SCORE: 1273.99

## 2019-06-12 NOTE — PROGRESS NOTES
Patient well-known to me post TPA T and post Quinn-en-Y gastric bypass.  Main issue is refractory constipation.  Had an ER visit recently with a severe bout of pain in her left flank which she thought was a kidney stone.  Noncontrast CT was negative and urinalysis negative but she was still packed with stool in the colon.  She is only on one MiraLAX pack and a day.  Impression we spent 15 minutes more than 50% counseling discussing that step 1 per my discussion with Dr. Flroentino cell is to increase her to 3 packets a day of MiraLAX.  If that does not achieve satisfactory results then refer her to the pelvic floor clinic for pelvic floor dysfunction.  If that is unrevealing then she could come back to see 1 of our luminal specialists and consider step up agent for refractory constipation.

## 2019-06-12 NOTE — PATIENT INSTRUCTIONS
1. Increase Miralax to 3 times a day.       Follow up: as needed     Please call with any questions or concerns regarding your clinic visit today.    It is a pleasure being involved in your health care.    Contacts post-consultation depending on your need:    Schedule Clinic Appointments                 [phone] # 1   M-F 7:30 - 5 pm    Erin Brown RN Care Coordinator       [phone]  #3  Elis Waller LPN                   [phone]  #3     OR Procedure Scheduling                       [phone]    My Chart is available 24 hours a day and is a secure way to access your records and communicate with your care team.  I strongly recommend signing up if you haven't already done so, if you are comfortable with computers.  If you would like to inquire about this or are having problems with My Chart access, you may call [phone] or go online at [email or web address].  Please allow at least 24 hours for a response and extra time on weekends and Holidays.

## 2019-06-12 NOTE — NURSING NOTE
"Chief Complaint   Patient presents with     RECHECK     pt here for 2 month recheck of abdominal pain, s/p pancreatectomy       Vitals:    06/12/19 1144   BP: (!) 150/91   BP Location: Left arm   Patient Position: Chair   Cuff Size: Adult Regular   Pulse: 91   SpO2: 99%   Weight: 69.9 kg (154 lb 1.6 oz)   Height: 1.626 m (5' 4\")       Body mass index is 26.45 kg/m .    Hermes Lock, EMT    "

## 2019-06-12 NOTE — LETTER
6/12/2019       RE: Lynn Thompson  [address] [facility] [address]     Dear Colleague,    Thank you for referring your patient, Lynn Thompson, to the [facility]. Please see a copy of my visit note below.    Patient well-known to me post TPA T and post Quinn-en-Y gastric bypass.  Main issue is refractory constipation.  Had an ER visit recently with a severe bout of pain in her left flank which she thought was a kidney stone.  Noncontrast CT was negative and urinalysis negative but she was still packed with stool in the colon.  She is only on one MiraLAX pack and a day.  Impression we spent 15 minutes more than 50% counseling discussing that step 1 per my discussion with Dr. Chadd galarza is to increase her to 3 packets a day of MiraLAX.  If that does not achieve satisfactory results then refer her to the pelvic floor clinic for pelvic floor dysfunction.  If that is unrevealing then she could come back to see 1 of our luminal specialists and consider step up agent for refractory constipation.    Again, thank you for allowing me to participate in the care of your patient.      Sincerely,    Kirill Presley MD

## 2019-06-15 ENCOUNTER — TRANSFERRED RECORDS (OUTPATIENT)
Dept: HEALTH INFORMATION MANAGEMENT | Facility: CLINIC | Age: 54
End: 2019-06-15

## 2019-06-15 LAB — RETINOPATHY: NEGATIVE

## 2019-07-09 ENCOUNTER — OFFICE VISIT (OUTPATIENT)
Dept: FAMILY MEDICINE | Facility: CLINIC | Age: 54
End: 2019-07-09
Payer: COMMERCIAL

## 2019-07-09 VITALS
OXYGEN SATURATION: 97 % | BODY MASS INDEX: 26.72 KG/M2 | SYSTOLIC BLOOD PRESSURE: 124 MMHG | WEIGHT: 156.5 LBS | TEMPERATURE: 99 F | HEIGHT: 64 IN | HEART RATE: 66 BPM | DIASTOLIC BLOOD PRESSURE: 76 MMHG

## 2019-07-09 DIAGNOSIS — Q89.01 ASPLENIA: ICD-10-CM

## 2019-07-09 DIAGNOSIS — E03.9 ACQUIRED HYPOTHYROIDISM: ICD-10-CM

## 2019-07-09 DIAGNOSIS — K21.9 GASTROESOPHAGEAL REFLUX DISEASE WITHOUT ESOPHAGITIS: ICD-10-CM

## 2019-07-09 DIAGNOSIS — F41.9 ANXIETY: ICD-10-CM

## 2019-07-09 DIAGNOSIS — G47.00 PERSISTENT INSOMNIA: ICD-10-CM

## 2019-07-09 DIAGNOSIS — Z00.00 ROUTINE GENERAL MEDICAL EXAMINATION AT A HEALTH CARE FACILITY: Primary | ICD-10-CM

## 2019-07-09 LAB
CHOLEST SERPL-MCNC: 210 MG/DL
CREAT UR-MCNC: 172 MG/DL
HDLC SERPL-MCNC: 92 MG/DL
LDLC SERPL CALC-MCNC: 97 MG/DL
MICROALBUMIN UR-MCNC: 6 MG/L
MICROALBUMIN/CREAT UR: 3.42 MG/G CR (ref 0–25)
NONHDLC SERPL-MCNC: 118 MG/DL
TRIGL SERPL-MCNC: 104 MG/DL
TSH SERPL DL<=0.005 MIU/L-ACNC: 2.92 MU/L (ref 0.4–4)

## 2019-07-09 PROCEDURE — 36415 COLL VENOUS BLD VENIPUNCTURE: CPT | Performed by: FAMILY MEDICINE

## 2019-07-09 PROCEDURE — 84443 ASSAY THYROID STIM HORMONE: CPT | Performed by: FAMILY MEDICINE

## 2019-07-09 PROCEDURE — 90471 IMMUNIZATION ADMIN: CPT | Performed by: FAMILY MEDICINE

## 2019-07-09 PROCEDURE — 90732 PPSV23 VACC 2 YRS+ SUBQ/IM: CPT | Performed by: FAMILY MEDICINE

## 2019-07-09 PROCEDURE — 99396 PREV VISIT EST AGE 40-64: CPT | Mod: 25 | Performed by: FAMILY MEDICINE

## 2019-07-09 PROCEDURE — 82043 UR ALBUMIN QUANTITATIVE: CPT | Performed by: FAMILY MEDICINE

## 2019-07-09 PROCEDURE — 80061 LIPID PANEL: CPT | Performed by: FAMILY MEDICINE

## 2019-07-09 RX ORDER — LEVOTHYROXINE SODIUM 88 UG/1
88 TABLET ORAL DAILY
Qty: 90 TABLET | Refills: 3 | Status: SHIPPED | OUTPATIENT
Start: 2019-07-09 | End: 2020-06-08 | Stop reason: ALTCHOICE

## 2019-07-09 RX ORDER — OMEPRAZOLE 40 MG/1
40 CAPSULE, DELAYED RELEASE ORAL 2 TIMES DAILY
Qty: 180 CAPSULE | Refills: 3 | Status: SHIPPED | OUTPATIENT
Start: 2019-07-09 | End: 2020-08-03

## 2019-07-09 RX ORDER — DULOXETIN HYDROCHLORIDE 30 MG/1
30 CAPSULE, DELAYED RELEASE ORAL 2 TIMES DAILY
Qty: 180 CAPSULE | Refills: 3 | Status: SHIPPED | OUTPATIENT
Start: 2019-07-09 | End: 2020-06-08

## 2019-07-09 RX ORDER — TRAZODONE HYDROCHLORIDE 50 MG/1
50 TABLET, FILM COATED ORAL AT BEDTIME
Qty: 90 TABLET | Refills: 3 | Status: SHIPPED | OUTPATIENT
Start: 2019-07-09 | End: 2020-08-03

## 2019-07-09 ASSESSMENT — ENCOUNTER SYMPTOMS
ABDOMINAL PAIN: 1
NERVOUS/ANXIOUS: 1
DIZZINESS: 1
ARTHRALGIAS: 1
WEAKNESS: 1
DIARRHEA: 1
CONSTIPATION: 1
NAUSEA: 1
MYALGIAS: 1
BREAST MASS: 0
EYE PAIN: 1
HEADACHES: 1

## 2019-07-09 ASSESSMENT — MIFFLIN-ST. JEOR: SCORE: 1284.88

## 2019-07-09 ASSESSMENT — PATIENT HEALTH QUESTIONNAIRE - PHQ9
SUM OF ALL RESPONSES TO PHQ QUESTIONS 1-9: 20
SUM OF ALL RESPONSES TO PHQ QUESTIONS 1-9: 20
10. IF YOU CHECKED OFF ANY PROBLEMS, HOW DIFFICULT HAVE THESE PROBLEMS MADE IT FOR YOU TO DO YOUR WORK, TAKE CARE OF THINGS AT HOME, OR GET ALONG WITH OTHER PEOPLE: VERY DIFFICULT

## 2019-07-09 NOTE — PROGRESS NOTES
SUBJECTIVE:   CC: Lynn Thompson is an 54 year old woman who presents for preventive health visit.     Healthy Habits:     Getting at least 3 servings of Calcium per day:  Yes    Bi-annual eye exam:  Yes    Dental care twice a year:  Yes    Sleep apnea or symptoms of sleep apnea:  Sleep apnea    Diet:  Diabetic    Frequency of exercise:  2-3 days/week    Duration of exercise:  15-30 minutes    Taking medications regularly:  Yes    Medication side effects:  None    PHQ-2 Total Score: 6    Additional concerns today:  No      1. Following with Endocrinology for exocrine pancreatic insufficiency. Due to see them next month. No issues with hypoglycemia.     2. Feeling more down lately. Father in law's health declining. Has stress surrounding disability hearing upcoming. On lexapro 20 mg daily, cymbalta 30 mg BID. Meeting with therapist regularly, finds this helpful. Wonders if something different should be done with her medications.     3. Needs thyroid levels rechecked.     4. GERD - stable on PPI, requesting refills.     5. Insomnia - stable on current trazodone dose.        Today's PHQ-2 Score:   PHQ-2 ( 1999 Pfizer) 7/9/2019   Q1: Little interest or pleasure in doing things 3   Q2: Feeling down, depressed or hopeless 3   PHQ-2 Score 6   Q1: Little interest or pleasure in doing things Nearly every day   Q2: Feeling down, depressed or hopeless Nearly every day   PHQ-2 Score 6       Abuse: Current or Past(Physical, Sexual or Emotional)- No  Do you feel safe in your environment? Yes    Social History     Tobacco Use     Smoking status: Never Smoker     Smokeless tobacco: Never Used   Substance Use Topics     Alcohol use: No     Alcohol/week: 0.0 oz     If you drink alcohol do you typically have >3 drinks per day or >7 drinks per week? No    Alcohol Use 7/9/2019   Prescreen: >3 drinks/day or >7 drinks/week? Not Applicable   Prescreen: >3 drinks/day or >7 drinks/week? -       Reviewed orders with patient.  Reviewed  health maintenance and updated orders accordingly - Yes  Patient Active Problem List   Diagnosis     Iron deficiency anemia secondary to inadequate dietary iron intake     Gastric bypass status for obesity     Health Care Home     Chronic pain syndrome     Exocrine pancreatic insufficiency     Asplenia     BUL (obstructive sleep apnea)     S/P exploratory laparotomy     Dysthymia     Anxiety     Thyroid nodule     Acquired hypothyroidism     Post-pancreatectomy diabetes (H)     E coli bacteremia     Antibiotic-associated diarrhea     Elevated LFTs     Past Surgical History:   Procedure Laterality Date     APPENDECTOMY       C NONSPECIFIC PROCEDURE      R bunion     C NONSPECIFIC PROCEDURE      T & A     C NONSPECIFIC PROCEDURE      partial ileum resection     C NONSPECIFIC PROCEDURE      R ovarian cyst     C NONSPECIFIC PROCEDURE           C NONSPECIFIC PROCEDURE      GALL BLADDER     C NONSPECIFIC PROCEDURE      CBD stent; Dr. Presley     C NONSPECIFIC PROCEDURE   &     colonoscopy     C NONSPECIFIC PROCEDURE      Gastric bypass     CHOLECYSTECTOMY       COLONOSCOPY       COMBINED CYSTOSCOPY, RETROGRADES, URETEROSCOPY, LASER HOLMIUM LITHOTRIPSY URETER(S), INSERT STENT Right 3/23/2015    Procedure: COMBINED CYSTOSCOPY, RETROGRADES, URETEROSCOPY, LASER HOLMIUM LITHOTRIPSY URETER(S), INSERT STENT;  Surgeon: Kennedi Aldana MD;  Location: [facility] OR     COMBINED CYSTOSCOPY, RETROGRADES, URETEROSCOPY, LASER HOLMIUM LITHOTRIPSY URETER(S), INSERT STENT Right 2015    Procedure: COMBINED CYSTOSCOPY, RETROGRADES, URETEROSCOPY, LASER HOLMIUM LITHOTRIPSY URETER(S), INSERT STENT;  Surgeon: Kennedi Aldana MD;  Location: [facility] OR     COSMETIC SURGERY  2002    Tummy tuck     CYSTOSCOPY, RETROGRADES, INSERT STENT URETER(S), COMBINED  10/2/2012    Procedure: COMBINED CYSTOSCOPY, RETROGRADES, INSERT STENT URETER(S);  COMBINED CYSTOSCOPY,  , INSERT LEFT  STENT URETER;  Surgeon: Johny Baez MD;  Location: [facility] OR     ENT SURGERY       ESOPHAGOSCOPY, GASTROSCOPY, DUODENOSCOPY (EGD), COMBINED N/A 2018    Procedure: COMBINED ESOPHAGOSCOPY, GASTROSCOPY, DUODENOSCOPY (EGD);  ESOPHAGOSCOPY, GASTROSCOPY, DUODENOSCOPY (EGD)    ;  Surgeon: Tamir Rodgers MD;  Location: [facility] GI     EXTRACORPOREAL SHOCK WAVE LITHOTRIPSY (ESWL)  10/16/2012    Procedure: EXTRACORPOREAL SHOCK WAVE LITHOTRIPSY (ESWL);  left EXTRACORPOREAL SHOCK WAVE LITHOTRIPSY (ESWL) ;  Surgeon: Johny Baez MD;  Location: [facility] OR     GI SURGERY  2015    Small bowel obstruction     HC LAP,LYSIS OF ADHESIONS       HERNIA REPAIR  2015     LAPAROSCOPIC LYSIS ADHESIONS N/A 2015    Procedure: LAPAROSCOPIC LYSIS ADHESIONS;  Surgeon: Aaron Early MD;  [facility]     LAPAROSCOPIC LYSIS ADHESIONS N/A 2015    Procedure: LAPAROSCOPIC LYSIS ADHESIONS;  Surgeon: Aaron Early MD;  [facility]     PANCREATECTOMY, TRANSPLANT AUTO ISLET CELL, COMBINED  5/10/2013    Procedure: COMBINED PANCREATECTOMY, TRANSPLANT AUTO ISLET CELL;  Pancreatectomy, Auto Islet Cell Transplant   hernia repair, jejunostomy tube and liver biopsies with Anesthesia General with block;  Surgeon: Aaron Early MD;  [facility]: [facility] OR     TRANSPLANT  5/10/13       Social History     Tobacco Use     Smoking status: Never Smoker     Smokeless tobacco: Never Used   Substance Use Topics     Alcohol use: No     Alcohol/week: 0.0 oz     Family History   Problem Relation Age of Onset     Family History Negative Mother      Respiratory Father         COPD;  at 69     Genitourinary Problems Father         kidney stones     Substance Abuse Father      Depression Father      Asthma Father      Heart Disease Paternal Grandfather         M.I.     Coronary Artery Disease Paternal Grandfather      Hyperlipidemia Paternal Grandfather      Genitourinary Problems Brother         multiple brothers with kidney stones      Gastrointestinal Disease Maternal Grandmother         undiagnosed 'gut' issues     Coronary Artery Disease Maternal Grandfather      Hyperlipidemia Maternal Grandfather      Cerebrovascular Disease Paternal Grandmother         At the age of 103     Anxiety Disorder Paternal Grandmother      Osteoporosis Paternal Grandmother      Anxiety Disorder Son      Anxiety Disorder Daughter      Asthma Daughter            Mammogram Screening: Patient over age 50, mutual decision to screen reflected in health maintenance.    Pertinent mammograms are reviewed under the imaging tab.  History of abnormal Pap smear: NO - age 30-65 PAP every 5 years with negative HPV co-testing recommended  PAP / HPV Latest Ref Rng & Units 3/26/2019 9/14/2016 9/25/2012   PAP - NIL NIL NIL   HPV 16 DNA NEG:Negative Negative Negative -   HPV 18 DNA NEG:Negative Negative Negative -   OTHER HR HPV NEG:Negative Negative Negative -     Reviewed and updated as needed this visit by clinical staff  Tobacco  Allergies  Meds  Problems  Med Hx  Surg Hx  Fam Hx  Soc Hx          Reviewed and updated as needed this visit by Provider  Tobacco  Allergies  Meds  Problems  Med Hx  Surg Hx  Fam Hx            Review of Systems  CONSTITUTIONAL: NEGATIVE for fever, chills, change in weight  INTEGUMENTARY/SKIN: NEGATIVE for worrisome rashes, moles or lesions  EYES: NEGATIVE for vision changes or irritation  ENT: NEGATIVE for ear, mouth and throat problems  RESP: NEGATIVE for significant cough or SOB  BREAST: NEGATIVE for masses, tenderness or discharge  CV: NEGATIVE for chest pain, palpitations or peripheral edema  GI: NEGATIVE for nausea, abdominal pain, heartburn, or change in bowel habits  : NEGATIVE for unusual urinary or vaginal symptoms. No vaginal bleeding.  MUSCULOSKELETAL: NEGATIVE for significant arthralgias or myalgia  NEURO: NEGATIVE for weakness, dizziness or paresthesias  PSYCHIATRIC: NEGATIVE for changes in mood or affect      OBJECTIVE:   BP  "124/76 (BP Location: Right arm, Patient Position: Chair, Cuff Size: Adult Regular)   Pulse 66   Temp 99  F (37.2  C) (Oral)   Ht 1.626 m (5' 4\")   Wt 71 kg (156 lb 8 oz)   LMP 12/19/2013   SpO2 97%   BMI 26.86 kg/m    Physical Exam  GENERAL APPEARANCE: healthy, alert and no distress  EYES: Eyes grossly normal to inspection, PERRL and conjunctivae and sclerae normal  HENT: ear canals and TM's normal, nose and mouth without ulcers or lesions, oropharynx clear and oral mucous membranes moist  NECK: no adenopathy, no asymmetry, masses, or scars and thyroid normal to palpation  RESP: lungs clear to auscultation - no rales, rhonchi or wheezes  CV: regular rate and rhythm, normal S1 S2, no S3 or S4, no murmur, click or rub, no peripheral edema and peripheral pulses strong  ABDOMEN: soft, nontender, no hepatosplenomegaly, no masses and bowel sounds normal  MS: no musculoskeletal defects are noted and gait is age appropriate without ataxia  SKIN: no suspicious lesions or rashes  NEURO: Normal strength and tone, sensory exam grossly normal, mentation intact and speech normal  PSYCH: mentation appears normal and affect normal/bright    Diagnostic Test Results:  Labs reviewed in Epic    ASSESSMENT/PLAN:     1. Routine general medical examination at a health care facility  - Lipid panel reflex to direct LDL Fasting  - Albumin Random Urine Quantitative with Creat Ratio  - ADMIN 1st VACCINE    2. Gastroesophageal reflux disease without esophagitis - stable, refills  - ranitidine (ZANTAC) 150 MG tablet; Take 1 tablet (150 mg) by mouth 2 times daily  Dispense: 180 tablet; Refill: 3  - omeprazole (PRILOSEC) 40 MG DR capsule; Take 1 capsule (40 mg) by mouth 2 times daily Take 30-60 minutes before a meal.  Dispense: 180 capsule; Refill: 3    3. Acquired hypothyroidism - check thyroid levels today, refills  - levothyroxine (SYNTHROID/LEVOTHROID) 88 MCG tablet; Take 1 tablet (88 mcg) by mouth daily  Dispense: 90 tablet; Refill: " "3  - TSH with free T4 reflex    4. Anxiety - refills on Cymbalta, getting lexapro from another provider. Advised psychiatry consultation to discuss alternative options. She agrees.   - DULoxetine (CYMBALTA) 30 MG capsule; Take 1 capsule (30 mg) by mouth 2 times daily Fill at patient request.  Dispense: 180 capsule; Refill: 3  -  referral today    5. Persistent insomnia - stable, refills  - traZODone (DESYREL) 50 MG tablet; Take 1 tablet (50 mg) by mouth At Bedtime  Dispense: 90 tablet; Refill: 3    6. Asplenia - due for PPSV reboost today  - PNEUMOCOCCAL VACCINE,ADULT,SQ OR IM  - SCREENING QUESTIONS FOR ADULT IMMUNIZATIONS      COUNSELING:  Reviewed preventive health counseling, as reflected in patient instructions    Estimated body mass index is 26.86 kg/m  as calculated from the following:    Height as of this encounter: 1.626 m (5' 4\").    Weight as of this encounter: 71 kg (156 lb 8 oz).     reports that she has never smoked. She has never used smokeless tobacco.    Maryana Brooks MD  [facility]    Answers for HPI/ROS submitted by the patient on 7/9/2019   Annual Exam:  If you checked off any problems, how difficult have these problems made it for you to do your work, take care of things at home, or get along with other people?: Very difficult  PHQ9 TOTAL SCORE: 20    "

## 2019-07-09 NOTE — PATIENT INSTRUCTIONS
Ask pharmacy about Shingrix vaccine      Preventive Health Recommendations  Female Ages 50 - 64    Yearly exam: See your health care provider every year in order to  o Review health changes.   o Discuss preventive care.    o Review your medicines if your doctor has prescribed any.      Get a Pap test every three years (unless you have an abnormal result and your provider advises testing more often).    If you get Pap tests with HPV test, you only need to test every 5 years, unless you have an abnormal result.     You do not need a Pap test if your uterus was removed (hysterectomy) and you have not had cancer.    You should be tested each year for STDs (sexually transmitted diseases) if you're at risk.     Have a mammogram every 1 to 2 years.    Have a colonoscopy at age 50, or have a yearly FIT test (stool test). These exams screen for colon cancer.      Have a cholesterol test every 5 years, or more often if advised.    Have a diabetes test (fasting glucose) every three years. If you are at risk for diabetes, you should have this test more often.     If you are at risk for osteoporosis (brittle bone disease), think about having a bone density scan (DEXA).    Shots: Get a flu shot each year. Get a tetanus shot every 10 years.    Nutrition:     Eat at least 5 servings of fruits and vegetables each day.    Eat whole-grain bread, whole-wheat pasta and brown rice instead of white grains and rice.    Get adequate Calcium and Vitamin D.     Lifestyle    Exercise at least 150 minutes a week (30 minutes a day, 5 days a week). This will help you control your weight and prevent disease.    Limit alcohol to one drink per day.    No smoking.     Wear sunscreen to prevent skin cancer.     See your dentist every six months for an exam and cleaning.    See your eye doctor every 1 to 2 years.

## 2019-07-10 ASSESSMENT — PATIENT HEALTH QUESTIONNAIRE - PHQ9: SUM OF ALL RESPONSES TO PHQ QUESTIONS 1-9: 20

## 2019-08-15 ENCOUNTER — TELEPHONE (OUTPATIENT)
Dept: TRANSPLANT | Facility: CLINIC | Age: 54
End: 2019-08-15

## 2019-08-15 ENCOUNTER — MYC REFILL (OUTPATIENT)
Dept: TRANSPLANT | Facility: CLINIC | Age: 54
End: 2019-08-15

## 2019-08-15 DIAGNOSIS — K86.89 PANCREATIC INSUFFICIENCY: ICD-10-CM

## 2019-08-15 NOTE — TELEPHONE ENCOUNTER
PA Initiation    Medication: VIOKACE [id number] - PA INITIATED  Insurance Company: [employer] [place] - Phone [phone] Fax [phone]  Pharmacy Filling the Rx: [facility] #[id number] - [place], [place] - [address] [address]  Start Date: 8/15/2019

## 2019-08-27 NOTE — TELEPHONE ENCOUNTER
PRIOR AUTHORIZATION DENIED    Medication: VIOKACE [id number] - PA DENIED    Denial Date: 8/17/2019

## 2019-09-04 DIAGNOSIS — E89.1 POST-PANCREATECTOMY DIABETES (H): Primary | ICD-10-CM

## 2019-09-04 DIAGNOSIS — Z90.410 POST-PANCREATECTOMY DIABETES (H): Primary | ICD-10-CM

## 2019-09-04 DIAGNOSIS — E13.9 POST-PANCREATECTOMY DIABETES (H): Primary | ICD-10-CM

## 2019-09-05 ENCOUNTER — OFFICE VISIT (OUTPATIENT)
Dept: TRANSPLANT | Facility: CLINIC | Age: 54
End: 2019-09-05
Attending: PEDIATRICS
Payer: COMMERCIAL

## 2019-09-05 VITALS
WEIGHT: 156.8 LBS | HEART RATE: 86 BPM | HEIGHT: 65 IN | OXYGEN SATURATION: 97 % | DIASTOLIC BLOOD PRESSURE: 63 MMHG | BODY MASS INDEX: 26.12 KG/M2 | TEMPERATURE: 99.2 F | SYSTOLIC BLOOD PRESSURE: 129 MMHG

## 2019-09-05 VITALS
HEART RATE: 86 BPM | WEIGHT: 156.8 LBS | BODY MASS INDEX: 26.12 KG/M2 | OXYGEN SATURATION: 97 % | DIASTOLIC BLOOD PRESSURE: 63 MMHG | TEMPERATURE: 99.2 F | SYSTOLIC BLOOD PRESSURE: 129 MMHG | HEIGHT: 65 IN

## 2019-09-05 DIAGNOSIS — E13.9 POST-PANCREATECTOMY DIABETES (H): Primary | ICD-10-CM

## 2019-09-05 DIAGNOSIS — Z90.410 POST-PANCREATECTOMY DIABETES (H): Primary | ICD-10-CM

## 2019-09-05 DIAGNOSIS — E89.1 POST-PANCREATECTOMY DIABETES (H): Primary | ICD-10-CM

## 2019-09-05 LAB — HBA1C MFR BLD: 7.6 % (ref 0–5.6)

## 2019-09-05 PROCEDURE — G0463 HOSPITAL OUTPT CLINIC VISIT: HCPCS | Mod: ZF

## 2019-09-05 ASSESSMENT — MIFFLIN-ST. JEOR
SCORE: 1294.18
SCORE: 1294.18

## 2019-09-05 ASSESSMENT — PAIN SCALES - GENERAL: PAINLEVEL: MILD PAIN (2)

## 2019-09-05 NOTE — PROGRESS NOTES
[facility]  Islet Autotransplant, Diabetes Follow Up    Problem List:  Patient Active Problem List   Diagnosis     Iron deficiency anemia secondary to inadequate dietary iron intake     Gastric bypass status for obesity     Health Care Home     Chronic pain syndrome     Exocrine pancreatic insufficiency     Asplenia     BLU (obstructive sleep apnea)     S/P exploratory laparotomy     Dysthymia     Anxiety     Thyroid nodule     Acquired hypothyroidism     Post-pancreatectomy diabetes (H)     E coli bacteremia     Antibiotic-associated diarrhea     Elevated LFTs       HPI:  Lynn is a 54 year old female here for follow up o total pancreatectomy, islet cell autotransplant, splenectomy, liver biopsy (needle core), choledochoduodenostomy, feeding jejunostomy performed on 5/10/2013.  At the time of the procedure, the patient received 178,100 IEQ, or 3,209 IEQ/kg body weight. She has had two subsequent exploratory laparatomy for small bowel obstruction. Other notable endocrine history includes a complex cystic nodule in mid right thyroid lobe with 1 cm maximum diameter (saw Dr Adorno in 11/2016) which needs annual follow up U/S in Nov 2017, and mild hypothyroidism followed by PCP.  She had an episode of cholangitis and was hospitalized for 4 days 8/24/17 (fevers, RUQ pain, + Ecoli blood cx).    She was on NO insulin at her 1 year, 2 year, 3 year, 4 year transplant anniversaries and restart insulin just after 4 years post-tx, insulin restart date of  6/6/2017.  She is continuing to wean narcotics, on a suboxone wean, now on a 1/8 patch (Suboxone 1- 0.5 mg film) daily, with no other narcotics.     At today's visit, overall Lynn is doing well overall.      1)  Diabetes:  Lynn continues to have partial islet graft function.  Improving but above goal A1c.  Main hyperglycemia is post meal- especially PM when she eats large meal.  She is not having hypoglycemia.    Diabetes  history:  Current insulin regimen:  Lantus 6 units per day  Novolog 0.5 units per 15g, and correction scale of 0.5 u per 100 >150   TDD not reviewed    Meter pattern shows that she is pretty much normal BGs in the AM, but around lunch and especially after dinner she has some high excursions.  A1c has improved but is still above goal.    Recent A1c:   Lab Results   Component Value Date    A1C 7.6 2019    A1C 8.5 2019    A1C 6.9 2019    A1C 7.5 2018    A1C 6.3 2017       Hypoglycemia history:  None recent.  The patient has had NO episodes of severe hypoglycemia (seizure, coma, or neuroglycopenic symptoms severe enough to require assistance from another person).  Blood sugars were reviewed from the patient records and/or the meter download.    Average B mg/dL SD 35 mg/dL  Number of blood sugar checks per day 2.4 in this meter-- needs strips for 4 checks per day.     2)  GI symptoms/pain:  Actually doing better.  One formed stool daily without steattorrhea.  Remains on Viokace which she must have (non enteric coated) due to prior GI bypass surgery.  Working with natural practitioner and STOPPED miralax entirely but started 2 magnesium 400 mg per tab per day and this is actually working for her.  Has also started several other supplements that appear to be relatively benign, though one is bovine clostrum so I am not sure how well it is monitored for pathogens. Supplements  Dopatone Active  ImmunoG PRP  GI Synergy  Magnezyme (400 mg mag oxide)  Does still have abdominal aches as she describes, but no longer bloating. Next step is she is stopping diet soda, addressing dietary interventions.  NOT on suboxone.  Only pain meds are tylenol and ibuprofen.    3)  Psychosocial:  Lots of ongoing stress related to financial strain and sick family members.  She recognizes this stress contributing to physical health issues. She has a disability hearing this fall.     Pneumococcal vaccine booster  in July.      Review of systems:  Review Of Systems  Skin: negative  Eyes: negative  Ears/Nose/Throat: negative  Respiratory: No shortness of breath, dyspnea on exertion, cough, or hemoptysis  Cardiovascular: negative  Gastrointestinal: as above- improving  Genitourinary: negative  Musculoskeletal: negative  Neurologic: negative  Psychiatric: anxiety/depression  Hematologic/Lymphatic/Immunologic: negative  Endocrine: as above    Past Medical and Surgical History:  Past Medical History:   Diagnosis Date     Abdominal pain, epigastric 11/05, ongoing; goes to pain clinic    probable recurrent spasm sphincter of Oddi causing biliary colic pains      Benign paroxysmal positional vertigo     occ.      Calculus of kidney 5/05    x1 on L side passed, several stones.  Has been tested for oxalate.     Chronic abdominal pain 7/17/2013     Chronic pancreatitis (H) 7/17/2013     Depressive disorder, not elsewhere classified     also occ panic spells     Diabetes (H)     post pancreatectomy     Dyspepsia and other specified disorders of function of stomach 6/99    H. 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    ERCP with Stent of CBD by Fernandez @ [facility] with sx relief     Spasm of sphincter of Oddi     surgical + endoscopic stenting of pancreatic duct @ [facility] 5/23/06     Thyroid nodule 11/1/2016     Ureteral calculus 10/2/2012     Vaccination not carried out      Past Surgical History:   Procedure Laterality Date     APPENDECTOMY  1990     C NONSPECIFIC PROCEDURE  12/98    JAYME MAY  NONSPECIFIC PROCEDURE      T & A     C NONSPECIFIC PROCEDURE      partial ileum resection     C NONSPECIFIC PROCEDURE      R ovarian cyst     C NONSPECIFIC PROCEDURE           C NONSPECIFIC PROCEDURE      GALL BLADDER     C NONSPECIFIC PROCEDURE      CBD stent; Dr. Fernandez MAY NONSPECIFIC PROCEDURE   &     colonoscopy     C NONSPECIFIC PROCEDURE      Gastric bypass     CHOLECYSTECTOMY       COLONOSCOPY       COMBINED CYSTOSCOPY, RETROGRADES, URETEROSCOPY, LASER HOLMIUM LITHOTRIPSY URETER(S), INSERT STENT Right 3/23/2015    Procedure: COMBINED CYSTOSCOPY, RETROGRADES, URETEROSCOPY, LASER HOLMIUM LITHOTRIPSY URETER(S), INSERT STENT;  Surgeon: Kennedi Aldana MD;  Location: [facility]     COMBINED CYSTOSCOPY, RETROGRADES, URETEROSCOPY, LASER HOLMIUM LITHOTRIPSY URETER(S), INSERT STENT Right 2015    Procedure: COMBINED CYSTOSCOPY, RETROGRADES, URETEROSCOPY, LASER HOLMIUM LITHOTRIPSY URETER(S), INSERT STENT;  Surgeon: Kennedi Aldana MD;  Location: [facility] OR     COSMETIC SURGERY  2002    Tummy tuck     CYSTOSCOPY, RETROGRADES, INSERT STENT URETER(S), COMBINED  10/2/2012    Procedure: COMBINED CYSTOSCOPY, RETROGRADES, INSERT STENT URETER(S);  COMBINED CYSTOSCOPY,  , INSERT LEFT STENT URETER;  Surgeon: Johny Baez MD;  Location: [facility] OR     ENT SURGERY       ESOPHAGOSCOPY, GASTROSCOPY, DUODENOSCOPY (EGD), COMBINED N/A 2018    Procedure: COMBINED ESOPHAGOSCOPY, GASTROSCOPY, DUODENOSCOPY (EGD);  ESOPHAGOSCOPY, GASTROSCOPY, DUODENOSCOPY (EGD)    ;  Surgeon: Tamir Rodgers MD;  Location:  GI     EXTRACORPOREAL SHOCK WAVE LITHOTRIPSY (ESWL)  10/16/2012    Procedure: EXTRACORPOREAL SHOCK WAVE LITHOTRIPSY (ESWL);  left EXTRACORPOREAL SHOCK WAVE LITHOTRIPSY (ESWL) ;  Surgeon: Johny Baez MD;  Location: [facility] OR     GI SURGERY  2015    Small bowel obstruction     HC LAP,LYSIS OF ADHESIONS       HERNIA REPAIR  2015     LAPAROSCOPIC LYSIS  "ADHESIONS N/A 2015    Procedure: LAPAROSCOPIC LYSIS ADHESIONS;  Surgeon: Aaron Early MD;  [facility]: [facility]     LAPAROSCOPIC LYSIS ADHESIONS N/A 2015    Procedure: LAPAROSCOPIC LYSIS ADHESIONS;  Surgeon: Aaron Early MD;  [facility]: [facility]     PANCREATECTOMY, TRANSPLANT AUTO ISLET CELL, COMBINED  5/10/2013    Procedure: COMBINED PANCREATECTOMY, TRANSPLANT AUTO ISLET CELL;  Pancreatectomy, Auto Islet Cell Transplant   hernia repair, jejunostomy tube and liver biopsies with Anesthesia General with block;  Surgeon: Aaron Early MD;  Location: [facility] OR     TRANSPLANT  5/10/13       Family History:  New changes since last visit:  none  Family History   Problem Relation Age of Onset     Family History Negative Mother      Respiratory Father         COPD;  at 69     Genitourinary Problems Father         kidney stones     Substance Abuse Father      Depression Father      Asthma Father      Heart Disease Paternal Grandfather         M.I.     Coronary Artery Disease Paternal Grandfather      Hyperlipidemia Paternal Grandfather      Genitourinary Problems Brother         multiple brothers with kidney stones     Gastrointestinal Disease Maternal Grandmother         undiagnosed 'gut' issues     Coronary Artery Disease Maternal Grandfather      Hyperlipidemia Maternal Grandfather      Cerebrovascular Disease Paternal Grandmother         At the age of 103     Anxiety Disorder Paternal Grandmother      Osteoporosis Paternal Grandmother      Anxiety Disorder Son      Anxiety Disorder Daughter      Asthma Daughter        Social History:  Social History     Social History Narrative     Not on file     Does not work currently.  Social stressors     Physical Exam:  Vitals: /63   Pulse 86   Temp 99.2  F (37.3  C) (Oral)   Ht 1.638 m (5' 4.5\")   Wt 71.1 kg (156 lb 12.8 oz)   LMP 2013   SpO2 97%   BMI 26.50 kg/m    BMI= Body mass index is 26.5 kg/m .  General:  Well-appearing, NAD  Psych:  " Communicative, with normal affect         Assessment:  1.  Post pancreatectomy diabetes mellitus, s/p total pancreatectomy and islet autotransplant.    Lynn is a 54 year old with history of chronic pancreatitis who is s/p total pancreatectomy and islet autotransplant.  She was insulin independent until 6/6/2017 (just after 4 years post-tx) and now has partial islet function.    Today her A1c has improved but she continues to have some post prandial glucoses above goal. We made a modest adjustment to her carb coverage as indicated below.    She is generally doing better with regards to her GI function and symptoms so she will continue her same plan.  Right now is not taking Mirilax but it is working for her with the magnesium supplement with regular daily bowel pattern. However discussed should use miralax PRN if not stooling.       Plan:  1.  Changes to current diabetes regimen:  Patient Instructions     Your HbA1c is a little above goal but improving!  Now 7.6% which is down from 8.5%.  However, you do seem to be running higher in the afternoons and evenings, presumably related to eating.    1)  Continue Lantus 6 units    2) Let's increase Novolog to 0.5 unit per 10 grams                    What your blood glucose is before eating:   How much you plan to eat: 80- 150 mg/dL 151- 250 mg/dL 251 to 350 mg/dL   Less than 10 grams carb 0 0.5 1   10-19 grams carb 0.5 1 1.5   20- 29 grams carb 1 1.5 2   30- 39 grams carb 1.5 2 2.5   40- 49 grams carb 2 2.5 3   50 or more grams carb  2.5 3 3.5          Follow up Jan 2:  10:40am       2.  Frequency of blood sugar checks:  premeal and bedtime, and as needed for hypoglycemia (6 strip per day).    3.  Continue routine follow up for autoislet transplant patients:  Mixed meal test (6 mL/kg BoostHP to max of 360 mL) at 3 months, 6 months, and once a year post transplant.  Hemoglobin A1c levels at these time points and quarterly.    4.  Other issues addressed today:  As  above    Follow up:  As above        Contact me for questions at [phone] or [phone].  Emergency number to reach pediatric endocrinology after hours is [phone].        Adriana Robles MD  , Pediatric Endocrinology and Diabetes  Formerly [facility] [place]  [facility]    I spent 25  minutes face to face with Lynn, with more than 50% of that time counseling on plan of care.

## 2019-09-05 NOTE — PATIENT INSTRUCTIONS
Your HbA1c is a little above goal but improving!  Now 7.6% which is down from 8.5%.  However, you do seem to be running higher in the afternoons and evenings, presumably related to eating.    1)  Continue Lantus 6 units    2) Let's increase Novolog to 0.5 unit per 10 grams                    What your blood glucose is before eating:   How much you plan to eat: 80- 150 mg/dL 151- 250 mg/dL 251 to 350 mg/dL   Less than 10 grams carb 0 0.5 1   10-19 grams carb 0.5 1 1.5   20- 29 grams carb 1 1.5 2   30- 39 grams carb 1.5 2 2.5   40- 49 grams carb 2 2.5 3   50 or more grams carb  2.5 3 3.5          Follow up Jan 2:  10:40am

## 2019-09-05 NOTE — PROGRESS NOTES
"Chief Complaint   Patient presents with     RECHECK     F/U TP-IAT     Blood pressure 129/63, pulse 86, temperature 99.2  F (37.3  C), temperature source Oral, height 1.638 m (5' 4.5\"), weight 71.1 kg (156 lb 12.8 oz), last menstrual period 12/19/2013, SpO2 97 %, not currently breastfeeding.    Payal Joshua, [facility]    "

## 2019-09-05 NOTE — LETTER
"9/5/2019      RE: Lynn Thompson  [address] [facility] [id number]       Chief Complaint   Patient presents with     RECHECK     F/U TP-IAT     Blood pressure 129/63, pulse 86, temperature 99.2  F (37.3  C), temperature source Oral, height 1.638 m (5' 4.5\"), weight 71.1 kg (156 lb 12.8 oz), last menstrual period 12/19/2013, SpO2 97 %, not currently breastfeeding.    Payal Joshau, [facility]  Islet Autotransplant, Diabetes Follow Up    Problem List:  Patient Active Problem List   Diagnosis     Iron deficiency anemia secondary to inadequate dietary iron intake     Gastric bypass status for obesity     Health Care Home     Chronic pain syndrome     Exocrine pancreatic insufficiency     Asplenia     BLU (obstructive sleep apnea)     S/P exploratory laparotomy     Dysthymia     Anxiety     Thyroid nodule     Acquired hypothyroidism     Post-pancreatectomy diabetes (H)     E coli bacteremia     Antibiotic-associated diarrhea     Elevated LFTs       HPI:  Lynn is a 54 year old female here for follow up o total pancreatectomy, islet cell autotransplant, splenectomy, liver biopsy (needle core), choledochoduodenostomy, feeding jejunostomy performed on 5/10/2013.  At the time of the procedure, the patient received 178,100 IEQ, or 3,209 IEQ/kg body weight. She has had two subsequent exploratory laparatomy for small bowel obstruction. Other notable endocrine history includes a complex cystic nodule in mid right thyroid lobe with 1 cm maximum diameter (saw Dr Adorno in 11/2016) which needs annual follow up U/S in Nov 2017, and mild hypothyroidism followed by PCP.  She had an episode of cholangitis and was hospitalized for 4 days 8/24/17 (fevers, RUQ pain, + Ecoli blood cx).    She was on NO insulin at her 1 year, 2 year, 3 year, 4 year transplant anniversaries and restart insulin just after 4 years post-tx, insulin restart date of  6/6/2017.  She is continuing to wean " narcotics, on a suboxone wean, now on a 1/8 patch (Suboxone 1- 0.5 mg film) daily, with no other narcotics.     At today's visit, overall Lynn is doing well overall.      1)  Diabetes:  Lynn continues to have partial islet graft function.  Improving but above goal A1c.  Main hyperglycemia is post meal- especially PM when she eats large meal.  She is not having hypoglycemia.    Diabetes history:  Current insulin regimen:  Lantus 6 units per day  Novolog 0.5 units per 15g, and correction scale of 0.5 u per 100 >150   TDD not reviewed    Meter pattern shows that she is pretty much normal BGs in the AM, but around lunch and especially after dinner she has some high excursions.  A1c has improved but is still above goal.    Recent A1c:   Lab Results   Component Value Date    A1C 7.6 2019    A1C 8.5 2019    A1C 6.9 2019    A1C 7.5 2018    A1C 6.3 2017       Hypoglycemia history:  None recent.  The patient has had NO episodes of severe hypoglycemia (seizure, coma, or neuroglycopenic symptoms severe enough to require assistance from another person).  Blood sugars were reviewed from the patient records and/or the meter download.    Average B mg/dL SD 35 mg/dL  Number of blood sugar checks per day 2.4 in this meter-- needs strips for 4 checks per day.     2)  GI symptoms/pain:  Actually doing better.  One formed stool daily without steattorrhea.  Remains on Viokace which she must have (non enteric coated) due to prior GI bypass surgery.  Working with natural practitioner and STOPPED miralax entirely but started 2 magnesium 400 mg per tab per day and this is actually working for her.  Has also started several other supplements that appear to be relatively benign, though one is bovine clostrum so I am not sure how well it is monitored for pathogens. Supplements  Dopatone Active  ImmunoG PRP  GI Synergy  Magnezyme (400 mg mag oxide)  Does still have abdominal aches as she describes,  but no longer bloating. Next step is she is stopping diet soda, addressing dietary interventions.  NOT on suboxone.  Only pain meds are tylenol and ibuprofen.    3)  Psychosocial:  Lots of ongoing stress related to financial strain and sick family members.  She recognizes this stress contributing to physical health issues. She has a disability hearing this fall.     Pneumococcal vaccine booster in July.      Review of systems:  Review Of Systems  Skin: negative  Eyes: negative  Ears/Nose/Throat: negative  Respiratory: No shortness of breath, dyspnea on exertion, cough, or hemoptysis  Cardiovascular: negative  Gastrointestinal: as above- improving  Genitourinary: negative  Musculoskeletal: negative  Neurologic: negative  Psychiatric: anxiety/depression  Hematologic/Lymphatic/Immunologic: negative  Endocrine: as above    Past Medical and Surgical History:  Past Medical History:   Diagnosis Date     Abdominal pain, epigastric 11/05, ongoing; goes to pain clinic    probable recurrent spasm sphincter of Oddi causing biliary colic pains      Benign paroxysmal positional vertigo     occ.      Calculus of kidney 5/05    x1 on L side passed, several stones.  Has been tested for oxalate.     Chronic abdominal pain 7/17/2013     Chronic pancreatitis (H) 7/17/2013     Depressive disorder, not elsewhere classified     also occ panic spells     Diabetes (H)     post pancreatectomy     Dyspepsia and other specified disorders of function of stomach 6/99    H. 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     Pyelonephritis, unspecified 5/04, 10/8    L side     Regional enteritis of unspecified site 1987    inacive/remission     Sleep apnea     uses Cpap     Spasm of sphincter of Oddi      ERCP with Stent of CBD by Fernandez @ [facility] with sx relief     Spasm of sphincter of Oddi     surgical + endoscopic stenting of pancreatic duct @ [facility] 06     Thyroid nodule 2016     Ureteral calculus 10/2/2012     Vaccination not carried out      Past Surgical History:   Procedure Laterality Date     APPENDECTOMY       C NONSPECIFIC PROCEDURE      R bunion     C NONSPECIFIC PROCEDURE      T & A     C NONSPECIFIC PROCEDURE      partial ileum resection     C NONSPECIFIC PROCEDURE      R ovarian cyst     C NONSPECIFIC PROCEDURE           C NONSPECIFIC PROCEDURE      GALL BLADDER     C NONSPECIFIC PROCEDURE      CBD stent; Dr. Presley     C NONSPECIFIC PROCEDURE   &     colonoscopy     C NONSPECIFIC PROCEDURE      Gastric bypass     CHOLECYSTECTOMY       COLONOSCOPY       COMBINED CYSTOSCOPY, RETROGRADES, URETEROSCOPY, LASER HOLMIUM LITHOTRIPSY URETER(S), INSERT STENT Right 3/23/2015    Procedure: COMBINED CYSTOSCOPY, RETROGRADES, URETEROSCOPY, LASER HOLMIUM LITHOTRIPSY URETER(S), INSERT STENT;  Surgeon: Kennedi Aldana MD;  Location: [facility]     COMBINED CYSTOSCOPY, RETROGRADES, URETEROSCOPY, LASER HOLMIUM LITHOTRIPSY URETER(S), INSERT STENT Right 2015    Procedure: COMBINED CYSTOSCOPY, RETROGRADES, URETEROSCOPY, LASER HOLMIUM LITHOTRIPSY URETER(S), INSERT STENT;  Surgeon: Kennedi Aldana MD;  Location: [facility] OR     COSMETIC SURGERY  2002    [facility]     CYSTOSCOPY, RETROGRADES, INSERT STENT URETER(S), COMBINED  10/2/2012    Procedure: COMBINED CYSTOSCOPY, RETROGRADES, INSERT STENT URETER(S);  COMBINED CYSTOSCOPY,  , INSERT LEFT STENT URETER;  Surgeon: Johny Baez MD;  Location: [facility] OR     ENT SURGERY       ESOPHAGOSCOPY, GASTROSCOPY, DUODENOSCOPY (EGD), COMBINED N/A 2018    Procedure: COMBINED ESOPHAGOSCOPY, GASTROSCOPY, DUODENOSCOPY (EGD);  ESOPHAGOSCOPY, GASTROSCOPY, DUODENOSCOPY (EGD)    ;  Surgeon: Vishal  Tamir POPE MD;  Location: [facility] GI     EXTRACORPOREAL SHOCK WAVE LITHOTRIPSY (ESWL)  10/16/2012    Procedure: EXTRACORPOREAL SHOCK WAVE LITHOTRIPSY (ESWL);  left EXTRACORPOREAL SHOCK WAVE LITHOTRIPSY (ESWL) ;  Surgeon: Johny Baez MD;  Location: [facility] OR     GI SURGERY  2015    Small bowel obstruction     HC LAP,LYSIS OF ADHESIONS       HERNIA REPAIR  2015     LAPAROSCOPIC LYSIS ADHESIONS N/A 2015    Procedure: LAPAROSCOPIC LYSIS ADHESIONS;  Surgeon: Aaron Early MD;  [facility]     LAPAROSCOPIC LYSIS ADHESIONS N/A 2015    Procedure: LAPAROSCOPIC LYSIS ADHESIONS;  Surgeon: Aaron Early MD;  [facility]     PANCREATECTOMY, TRANSPLANT AUTO ISLET CELL, COMBINED  5/10/2013    Procedure: COMBINED PANCREATECTOMY, TRANSPLANT AUTO ISLET CELL;  Pancreatectomy, Auto Islet Cell Transplant   hernia repair, jejunostomy tube and liver biopsies with Anesthesia General with block;  Surgeon: Aaron Early MD;  Location: [facility] OR     TRANSPLANT  5/10/13       Family History:  New changes since last visit:  none  Family History   Problem Relation Age of Onset     Family History Negative Mother      Respiratory Father         COPD;  at 69     Genitourinary Problems Father         kidney stones     Substance Abuse Father      Depression Father      Asthma Father      Heart Disease Paternal Grandfather         M.I.     Coronary Artery Disease Paternal Grandfather      Hyperlipidemia Paternal Grandfather      Genitourinary Problems Brother         multiple brothers with kidney stones     Gastrointestinal Disease Maternal Grandmother         undiagnosed 'gut' issues     Coronary Artery Disease Maternal Grandfather      Hyperlipidemia Maternal Grandfather      Cerebrovascular Disease Paternal Grandmother         At the age of 103     Anxiety Disorder Paternal Grandmother      Osteoporosis Paternal Grandmother      Anxiety Disorder Son      Anxiety Disorder Daughter      Asthma Daughter        Social  "History:  Social History     Social History Narrative     Not on file     Does not work currently.  Social stressors     Physical Exam:  Vitals: /63   Pulse 86   Temp 99.2  F (37.3  C) (Oral)   Ht 1.638 m (5' 4.5\")   Wt 71.1 kg (156 lb 12.8 oz)   LMP 12/19/2013   SpO2 97%   BMI 26.50 kg/m     BMI= Body mass index is 26.5 kg/m .  General:  Well-appearing, NAD  Psych:  Communicative, with normal affect         Assessment:  1.  Post pancreatectomy diabetes mellitus, s/p total pancreatectomy and islet autotransplant.    Lynn is a 54 year old with history of chronic pancreatitis who is s/p total pancreatectomy and islet autotransplant.  She was insulin independent until 6/6/2017 (just after 4 years post-tx) and now has partial islet function.    Today her A1c has improved but she continues to have some post prandial glucoses above goal. We made a modest adjustment to her carb coverage as indicated below.    She is generally doing better with regards to her GI function and symptoms so she will continue her same plan.  Right now is not taking Mirilax but it is working for her with the magnesium supplement with regular daily bowel pattern. However discussed should use miralax PRN if not stooling.       Plan:  1.  Changes to current diabetes regimen:  Patient Instructions     Your HbA1c is a little above goal but improving!  Now 7.6% which is down from 8.5%.  However, you do seem to be running higher in the afternoons and evenings, presumably related to eating.    1)  Continue Lantus 6 units    2) Let's increase Novolog to 0.5 unit per 10 grams                    What your blood glucose is before eating:   How much you plan to eat: 80- 150 mg/dL 151- 250 mg/dL 251 to 350 mg/dL   Less than 10 grams carb 0 0.5 1   10-19 grams carb 0.5 1 1.5   20- 29 grams carb 1 1.5 2   30- 39 grams carb 1.5 2 2.5   40- 49 grams carb 2 2.5 3   50 or more grams carb  2.5 3 3.5          Follow up Jan 2:  10:40am       2.  " Frequency of blood sugar checks:  premeal and bedtime, and as needed for hypoglycemia (6 strip per day).    3.  Continue routine follow up for autoislet transplant patients:  Mixed meal test (6 mL/kg BoostHP to max of 360 mL) at 3 months, 6 months, and once a year post transplant.  Hemoglobin A1c levels at these time points and quarterly.    4.  Other issues addressed today:  As above    Follow up:  As above        Contact me for questions at [phone] or [phone].  Emergency number to reach pediatric endocrinology after hours is [phone].        Adriana Robles MD  , Pediatric Endocrinology and Diabetes  Formerly [facility] [place]  [facility]    I spent 25  minutes face to face with Lynn, with more than 50% of that time counseling on plan of care.    Adriana Robles MD

## 2019-09-11 ENCOUNTER — TELEPHONE (OUTPATIENT)
Dept: FAMILY MEDICINE | Facility: CLINIC | Age: 54
End: 2019-09-11

## 2019-09-11 DIAGNOSIS — H02.409 PTOSIS OF EYELID, UNSPECIFIED LATERALITY: Primary | ICD-10-CM

## 2019-09-11 NOTE — TELEPHONE ENCOUNTER
Dr. Brooks:    The Pt saw her eye doctor recently for a general exam, and her doctor advised her to see the below provider to assess her upper eyelid due to excessive skin hanging over.   This is causing her discomfort and her eye doctor advised she see a eye plastic surgeon to be evaluated for eye schroeder surgery.     Can you place a referral?    Arminda Wolf RN -- [facility]

## 2019-09-11 NOTE — TELEPHONE ENCOUNTER
Patient would like a referral to:    Dr. Marissa Alberts in [facility]:    Fax: [phone]

## 2019-09-19 ENCOUNTER — TRANSFERRED RECORDS (OUTPATIENT)
Dept: HEALTH INFORMATION MANAGEMENT | Facility: CLINIC | Age: 54
End: 2019-09-19

## 2019-09-30 DIAGNOSIS — Z90.410 POST-PANCREATECTOMY DIABETES (H): ICD-10-CM

## 2019-09-30 DIAGNOSIS — E89.1 POST-PANCREATECTOMY DIABETES (H): ICD-10-CM

## 2019-09-30 DIAGNOSIS — E13.9 POST-PANCREATECTOMY DIABETES (H): ICD-10-CM

## 2019-10-01 ENCOUNTER — HEALTH MAINTENANCE LETTER (OUTPATIENT)
Age: 54
End: 2019-10-01

## 2019-10-29 DIAGNOSIS — E89.1 POST-PANCREATECTOMY DIABETES (H): Primary | ICD-10-CM

## 2019-10-29 DIAGNOSIS — E13.9 POST-PANCREATECTOMY DIABETES (H): Primary | ICD-10-CM

## 2019-10-29 DIAGNOSIS — Z90.410 POST-PANCREATECTOMY DIABETES (H): Primary | ICD-10-CM

## 2019-10-31 ENCOUNTER — OFFICE VISIT (OUTPATIENT)
Dept: FAMILY MEDICINE | Facility: CLINIC | Age: 54
End: 2019-10-31
Payer: COMMERCIAL

## 2019-10-31 VITALS
HEIGHT: 64 IN | TEMPERATURE: 98.4 F | HEART RATE: 86 BPM | WEIGHT: 160 LBS | SYSTOLIC BLOOD PRESSURE: 120 MMHG | BODY MASS INDEX: 27.31 KG/M2 | DIASTOLIC BLOOD PRESSURE: 80 MMHG | RESPIRATION RATE: 14 BRPM

## 2019-10-31 DIAGNOSIS — Z23 NEED FOR PROPHYLACTIC VACCINATION AND INOCULATION AGAINST INFLUENZA: ICD-10-CM

## 2019-10-31 DIAGNOSIS — D50.8 IRON DEFICIENCY ANEMIA SECONDARY TO INADEQUATE DIETARY IRON INTAKE: ICD-10-CM

## 2019-10-31 DIAGNOSIS — E89.1 POST-PANCREATECTOMY DIABETES (H): ICD-10-CM

## 2019-10-31 DIAGNOSIS — Q89.01 ASPLENIA: ICD-10-CM

## 2019-10-31 DIAGNOSIS — Z01.818 PREOP GENERAL PHYSICAL EXAM: Primary | ICD-10-CM

## 2019-10-31 DIAGNOSIS — E13.9 POST-PANCREATECTOMY DIABETES (H): ICD-10-CM

## 2019-10-31 DIAGNOSIS — Z90.410 POST-PANCREATECTOMY DIABETES (H): ICD-10-CM

## 2019-10-31 LAB
ERYTHROCYTE [DISTWIDTH] IN BLOOD BY AUTOMATED COUNT: 14.5 % (ref 10–15)
HCT VFR BLD AUTO: 37.9 % (ref 35–47)
HGB BLD-MCNC: 12.3 G/DL (ref 11.7–15.7)
MCH RBC QN AUTO: 29.9 PG (ref 26.5–33)
MCHC RBC AUTO-ENTMCNC: 32.5 G/DL (ref 31.5–36.5)
MCV RBC AUTO: 92 FL (ref 78–100)
PLATELET # BLD AUTO: 358 10E9/L (ref 150–450)
RBC # BLD AUTO: 4.12 10E12/L (ref 3.8–5.2)
WBC # BLD AUTO: 7.6 10E9/L (ref 4–11)

## 2019-10-31 PROCEDURE — 80048 BASIC METABOLIC PNL TOTAL CA: CPT | Performed by: FAMILY MEDICINE

## 2019-10-31 PROCEDURE — 85027 COMPLETE CBC AUTOMATED: CPT | Performed by: FAMILY MEDICINE

## 2019-10-31 PROCEDURE — 99215 OFFICE O/P EST HI 40 MIN: CPT | Mod: 25 | Performed by: FAMILY MEDICINE

## 2019-10-31 PROCEDURE — 36415 COLL VENOUS BLD VENIPUNCTURE: CPT | Performed by: FAMILY MEDICINE

## 2019-10-31 PROCEDURE — 83735 ASSAY OF MAGNESIUM: CPT | Performed by: FAMILY MEDICINE

## 2019-10-31 PROCEDURE — 90662 IIV NO PRSV INCREASED AG IM: CPT | Performed by: FAMILY MEDICINE

## 2019-10-31 PROCEDURE — 90471 IMMUNIZATION ADMIN: CPT | Performed by: FAMILY MEDICINE

## 2019-10-31 ASSESSMENT — MIFFLIN-ST. JEOR: SCORE: 1300.76

## 2019-10-31 NOTE — PROGRESS NOTES
[facility]  [address] [place] [id number] [phone]  Dept: [phone]    PRE-OP EVALUATION:  Today's date: 10/31/2019    Lynn Thompson (: 1965) presents for pre-operative evaluation assessment as requested by Dr. Alberts.  She requires evaluation and anesthesia risk assessment prior to undergoing surgery/procedure for treatment of eyelid ptosis.    Fax number for surgical facility: [phone]  Primary Physician: Maryana Brooks  Type of Anesthesia Anticipated: TBD    Patient has a Health Care Directive or Living Will:  NO    Preop Questions 10/27/2019   Who is doing your surgery? Dr Alberts   What are you having done? blepharoplasty   Date of Surgery/Procedure: 2019   Facility or Hospital where procedure/surgery will be performed: [facility] [place]   1.  Do you have a history of Heart attack, stroke, stent, coronary bypass surgery, or other heart surgery? No   2.  Do you ever have any pain or discomfort in your chest? No   3.  Do you have a history of  Heart Failure? No   4.   Are you troubled by shortness of breath when:  walking on a level surface, or up a slight hill, or at night? No   5.  Do you currently have a cold, bronchitis or other respiratory infection? No   6.  Do you have a cough, shortness of breath, or wheezing? No   7.  Do you sometimes get pains in the calves of your legs when you walk? No   8. Do you or anyone in your family have previous history of blood clots? No   9.  Do you or does anyone in your family have a serious bleeding problem such as prolonged bleeding following surgeries or cuts? No   10. Have you ever had problems with anemia or been told to take iron pills? YES - IV iron infusions following gastric bypass surgery and anemia of chronic disease due to Crohn's   11. Have you had any abnormal blood loss such as black, tarry or bloody stools, or abnormal vaginal bleeding? YES - Crohn's disease   12. Have you ever had a blood  transfusion? No   13. Have you or any of your relatives ever had problems with anesthesia? No   14. Do you have sleep apnea, excessive snoring or daytime drowsiness? YES - obstructive sleep apnea, wears a splint overnight   15. Do you have any prosthetic heart valves? No   16. Do you have prosthetic joints? No   17. Is there any chance that you may be pregnant? No       HPI:     HPI related to upcoming procedure: trouble with peripheral vision      ANEMIA - Patient has a recent history of moderate-severe anemia, which has been symptomatic. Following with Hematology, has been stable for the past 3 years. Treatment has been IV iron infusion.     HYPOTHYROIDISM - Patient has a longstanding history of chronic Hypothyroidism. Patient has been doing well, noting no tremor, insomnia, hair loss or changes in skin texture. Continues to take medications as directed, without adverse reactions or side effects. Last TSH   Lab Results   Component Value Date    TSH 2.92 07/09/2019   .        MEDICAL HISTORY:     Patient Active Problem List    Diagnosis Date Noted     Health Care Home 05/03/2011     Priority: High     EMERGENCY CARE PLAN  Presenting Problem Signs and Symptoms Treatment Plan    Questions or conerns during clinic hours    I will call the clinic directly     Questions or conerns outside clinic hours    I will call the 24 hour nurse line at [phone]    Patient needs to schedule an appointment    I will call the 24 hour scheduling team at [phone] or clinic directly    Same day treatment     I will call the clinic first, nurse line if after hours, urgent care and express care if needed                            DX V65.8 REPLACED WITH [id number] [facility] CARE HOME (04/08/2013)       Antibiotic-associated diarrhea 08/28/2017     Priority: Medium     Elevated LFTs 08/28/2017     Priority: Medium     E coli bacteremia 08/25/2017     Priority: Medium     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     Priority: Medium      Acquired hypothyroidism 11/03/2016     Priority: Medium     Thyroid nodule 11/01/2016     Priority: Medium     Dysthymia 03/01/2016     Priority: Medium     Anxiety 03/01/2016     Priority: Medium     S/P exploratory laparotomy 12/29/2015     Priority: Medium     BLU (obstructive sleep apnea) 12/23/2015     Priority: Medium     Asplenia 10/24/2014     Priority: Medium     Exocrine pancreatic insufficiency 05/17/2013     Priority: Medium     Chronic pain syndrome 02/23/2013     Priority: Medium     Gastric bypass status for obesity 10/26/2010     Priority: Medium     Iron deficiency anemia secondary to inadequate dietary iron intake 12/28/2004     Priority: Medium     relates to gastric bypass        Past Medical History:   Diagnosis Date     Abdominal pain, epigastric 11/05, ongoing; goes to pain clinic    probable recurrent spasm sphincter of Oddi causing biliary colic pains      Benign paroxysmal positional vertigo     occ.      Calculus of kidney 5/05    x1 on L side passed, several stones.  Has been tested for oxalate.     Chronic abdominal pain 7/17/2013     Chronic pancreatitis (H) 7/17/2013     Depressive disorder, not elsewhere classified     also occ panic spells     Diabetes (H)     post pancreatectomy     Dyspepsia and other specified disorders of function of stomach 6/99    H. 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    [place] [facility] [place]x [place]     Spasm of sphincter of Oddi     surgical + endoscopic stenting of pancreatic duct @ [facility] [place]  06     Thyroid nodule 2016     Ureteral calculus 10/2/2012     Vaccination not carried out      Past Surgical History:   Procedure Laterality Date     APPENDECTOMY       C NONSPECIFIC PROCEDURE      R bunion     C NONSPECIFIC PROCEDURE      T & A     C NONSPECIFIC PROCEDURE      partial ileum resection     C NONSPECIFIC PROCEDURE      R ovarian cyst     C NONSPECIFIC PROCEDURE           C NONSPECIFIC PROCEDURE      GALL BLADDER     C NONSPECIFIC PROCEDURE      CBD stent; Dr. Presley     C NONSPECIFIC PROCEDURE   &     colonoscopy     C NONSPECIFIC PROCEDURE      Gastric bypass     CHOLECYSTECTOMY       COLONOSCOPY       COMBINED CYSTOSCOPY, RETROGRADES, URETEROSCOPY, LASER HOLMIUM LITHOTRIPSY URETER(S), INSERT STENT Right 3/23/2015    Procedure: COMBINED CYSTOSCOPY, RETROGRADES, URETEROSCOPY, LASER HOLMIUM LITHOTRIPSY URETER(S), INSERT STENT;  Surgeon: Kennedi Aldana MD;  Location: [facility] OR     COMBINED CYSTOSCOPY, RETROGRADES, URETEROSCOPY, LASER HOLMIUM LITHOTRIPSY URETER(S), INSERT STENT Right 2015    Procedure: COMBINED CYSTOSCOPY, RETROGRADES, URETEROSCOPY, LASER HOLMIUM LITHOTRIPSY URETER(S), INSERT STENT;  Surgeon: Kennedi Aldana MD;  Location: [facility] OR     COSMETIC SURGERY  2002    [facility]     CYSTOSCOPY, RETROGRADES, INSERT STENT URETER(S), COMBINED  10/2/2012    Procedure: COMBINED CYSTOSCOPY, RETROGRADES, INSERT STENT URETER(S);  COMBINED CYSTOSCOPY,  , INSERT LEFT STENT URETER;  Surgeon: Johny Baez MD;  Location:  OR     ENT SURGERY       ESOPHAGOSCOPY, GASTROSCOPY, DUODENOSCOPY (EGD), COMBINED N/A 2018    Procedure: COMBINED ESOPHAGOSCOPY, GASTROSCOPY, DUODENOSCOPY (EGD);  ESOPHAGOSCOPY, GASTROSCOPY, DUODENOSCOPY (EGD)    ;  Surgeon: Tamir Rodgers MD;  Location:  GI     EXTRACORPOREAL SHOCK WAVE LITHOTRIPSY (ESWL)  10/16/2012    Procedure: EXTRACORPOREAL SHOCK WAVE LITHOTRIPSY (ESWL);  left  EXTRACORPOREAL SHOCK WAVE LITHOTRIPSY (ESWL) ;  Surgeon: Johny Baez MD;  Location: [facility] OR     GI SURGERY  12/29/2015    Small bowel obstruction     HC LAP,LYSIS OF ADHESIONS       HERNIA REPAIR  2/2015     LAPAROSCOPIC LYSIS ADHESIONS N/A 2/20/2015    Procedure: LAPAROSCOPIC LYSIS ADHESIONS;  Surgeon: Aaron Early MD;  Location: [facility] OR     LAPAROSCOPIC LYSIS ADHESIONS N/A 12/29/2015    Procedure: LAPAROSCOPIC LYSIS ADHESIONS;  Surgeon: Aaron Early MD;  [facility]     PANCREATECTOMY, TRANSPLANT AUTO ISLET CELL, COMBINED  5/10/2013    Procedure: COMBINED PANCREATECTOMY, TRANSPLANT AUTO ISLET CELL;  Pancreatectomy, Auto Islet Cell Transplant   hernia repair, jejunostomy tube and liver biopsies with Anesthesia General with block;  Surgeon: Aaron Early MD;  Location: [facility] OR     TRANSPLANT  5/10/13     Current Outpatient Medications   Medication Sig Dispense Refill     amylase-lipase-protease (VIOKACE) 23250 units TABS tablet Take 4-5 caps with meals and 1-2 with snacks 600 tablet 5     blood glucose (BILL MICROLET 2) lancing device Use to test blood sugars 6 times daily or as directed. 1 each 11     blood glucose monitoring (BILL CONTOUR NEXT) test strip Check BS 4-6 times a day 2 Box 6     DULoxetine (CYMBALTA) 30 MG capsule Take 1 capsule (30 mg) by mouth 2 times daily Fill at patient request. 180 capsule 3     escitalopram (LEXAPRO) 10 MG tablet 20 mg   2     estradiol (ESTRACE) 0.1 MG/GM vaginal cream PLACE 2 GRAMS VAGINALLY TWICE A WEEK 42.5 g 11     Estradiol-Norethindrone Acet 0.5-0.1 MG TABS Take 1 tablet by mouth daily 84 tablet 3     gabapentin (NEURONTIN) 300 MG capsule        glucose 40 % GEL Take 15-30 g by mouth every 15 minutes as needed. 1 Tube 11     Injection Device for insulin (NOVOPEN ECHO) MODESTA Use to inject insulin subcutaneously four times daily 1 each 1     Injection Device for insulin (NOVOPEN HUNTER CARBAJAL,) MODESTA 1 Device Inject 1 unit (which is marked as a 1/2 unit on the pen  itself)  as needed when eating carb heavy meals.       insulin aspart (NOVOLOG PENFILL) 100 UNITS/ML injection Inject 0.5-2 units 4 times daily 3 mL 3     insulin glargine (LANTUS SOLOSTAR) 100 UNIT/ML pen Inject 5 Units Subcutaneous every evening 15 mL 3     insulin pen needle (BD MAHESH U/F) 32G X 4 MM Use up to 3 times daily as needed for insulin dosing 100 each prn     levothyroxine (SYNTHROID/LEVOTHROID) 88 MCG tablet Take 1 tablet (88 mcg) by mouth daily 90 tablet 3     loratadine (CLARITIN) 10 MG tablet Take 10 mg by mouth daily as needed        modafinil (PROVIGIL) 200 MG tablet Take 2 tablets by mouth Once a Day       omeprazole (PRILOSEC) 40 MG DR capsule Take 1 capsule (40 mg) by mouth 2 times daily Take 30-60 minutes before a meal. 180 capsule 3     ondansetron (ZOFRAN ODT) 4 MG ODT tab Take 1-2 tablets (4-8 mg) by mouth 3 times daily (before meals) 20 tablet 3     ranitidine (ZANTAC) 150 MG tablet Take 1 tablet (150 mg) by mouth 2 times daily 180 tablet 3     Sharps Container MISC For insulin needles 1 each prn     traZODone (DESYREL) 50 MG tablet Take 1 tablet (50 mg) by mouth At Bedtime 90 tablet 3     UNABLE TO FIND MEDICATION NAME: Dopatone Active       UNABLE TO FIND MEDICATION NAME: magnezyme       UNABLE TO FIND MEDICATION NAME: immunoG PRP       UNABLE TO FIND MEDICATION NAME: GI-Synergy       ACETAMINOPHEN PO Take 325 mg by mouth every 6 hours as needed for pain       blood glucose monitoring (NO BRAND SPECIFIED) test strip        CLONIDINE HCL PO Take 0.1 mg by mouth 2 times daily as needed       HydrOXYzine Pamoate (VISTARIL PO) Take 25 mg by mouth every 6 hours as needed for itching       OTC products: None, except as noted above    Allergies   Allergen Reactions     Povidone Iodine Hives     Causes skin to blister     Corticosteroids Other (See Comments)     All oral,IV and injectable steroids are contraindicated (unless in life threatening situations) in Islet Auto transplant recipients.  "They can cause irreversible loss of islet cell function. Please contact patients transplant care coordinator JONATHAN Carvalho RN at /pager   and Endocrinologist prior to administration.      Nsaids      naprosyn = GI upset     Povidone Iodine      blisters     Sulfasalazine Nausea and Nausea and Vomiting      Latex Allergy: NO    Social History     Tobacco Use     Smoking status: Never Smoker     Smokeless tobacco: Never Used   Substance Use Topics     Alcohol use: No     Alcohol/week: 0.0 standard drinks     History   Drug Use No       REVIEW OF SYSTEMS:   Constitutional, neuro, ENT, endocrine, pulmonary, cardiac, gastrointestinal, genitourinary, musculoskeletal, integument and psychiatric systems are negative, except as otherwise noted.    EXAM:   /80 (BP Location: Right arm, Patient Position: Sitting, Cuff Size: Adult Large)   Pulse 86   Temp 98.4  F (36.9  C) (Oral)   Resp 14   Ht 1.626 m (5' 4\")   Wt 72.6 kg (160 lb)   LMP 12/19/2013   Breastfeeding? No   BMI 27.46 kg/m      GENERAL APPEARANCE: healthy, alert and no distress     EYES: EOMI, PERRL     HENT: ear canals and TM's normal and nose and mouth without ulcers or lesions     NECK: no adenopathy, no asymmetry, masses, or scars and thyroid normal to palpation     RESP: lungs clear to auscultation - no rales, rhonchi or wheezes     CV: regular rates and rhythm, normal S1 S2, no S3 or S4 and no murmur, click or rub     ABDOMEN:  soft, nontender, no HSM or masses and bowel sounds normal     MS: extremities normal- no gross deformities noted, no evidence of inflammation in joints, FROM in all extremities.     SKIN: no suspicious lesions or rashes     NEURO: Normal strength and tone, sensory exam grossly normal, mentation intact and speech normal     PSYCH: mentation appears normal. and affect normal/bright     LYMPHATICS: No cervical adenopathy    DIAGNOSTICS:   EKG: Not indicated due to non-vascular surgery and low risk of " event (age <65 and without cardiac risk factors)    Lab Results   Component Value Date    WBC 7.6 10/31/2019     Lab Results   Component Value Date    RBC 4.12 10/31/2019     Lab Results   Component Value Date    HGB 12.3 10/31/2019     Lab Results   Component Value Date    HCT 37.9 10/31/2019     No components found for: MCT  Lab Results   Component Value Date    MCV 92 10/31/2019     Lab Results   Component Value Date    MCH 29.9 10/31/2019     Lab Results   Component Value Date    MCHC 32.5 10/31/2019     Lab Results   Component Value Date    RDW 14.5 10/31/2019     Lab Results   Component Value Date     10/31/2019       POC glucose: 101      Recent Labs   Lab Test 09/05/19 05/16/19  0655 05/07/19  1640  05/10/16  2019  05/10/13  1950   HGB  --  11.2* 12.3   < > 12.0   < > 8.1*   PLT  --  320 397   < > 383   < > 181   INR  --   --   --   --  1.03  --  1.29*   NA  --  142 138   < > 136   < > 140   POTASSIUM  --  3.5 3.7   < > 4.5   < > 4.4   CR  --  0.77 0.90   < > 0.72   < > 0.76   A1C 7.6* 8.5*  --    < >  --    < >  --     < > = values in this interval not displayed.        IMPRESSION:   Diagnosis/reason for consult: pre operative clearance for eyelid surgery under general anesthesia    The proposed surgical procedure is considered LOW risk.    REVISED CARDIAC RISK INDEX  The patient has the following serious cardiovascular risks for perioperative complications such as (MI, PE, VFib and 3  AV Block):  Diabetes Mellitus (on Insulin)  INTERPRETATION: 1 risks: Class II (low risk - 0.9% complication rate)    The patient has the following additional risks for perioperative complications:  No identified additional risks      ICD-10-CM    1. Preop general physical exam Z01.818 Magnesium     Basic metabolic panel  (Ca, Cl, CO2, Creat, Gluc, K, Na, BUN)     CBC with platelets   2. Need for prophylactic vaccination and inoculation against influenza Z23    3. Post-pancreatectomy diabetes (H) E89.1 Controlling with  insulin, last A1c in range, POC glucose 101 today    E13.9     Z90.410    4. Iron deficiency anemia secondary to inadequate dietary iron intake D50.8 Hgb normal today, ok for surgery   5. Asplenia Q89.01        RECOMMENDATIONS:     --Patient is to take all scheduled medications on the day of surgery EXCEPT for modifications listed below.    Diabetes Medication Use  -----Take 60% of long acting insulin (e.g. Lantus, NPH) while NPO (fasting)  -----Hold mixed insulins (e.g. Insulin 70/30) while NPO (fasting)      APPROVAL GIVEN to proceed with proposed procedure, without further diagnostic evaluation       Signed Electronically by: Maryana Brooks MD

## 2019-10-31 NOTE — PATIENT INSTRUCTIONS
Take 4 units glargine at bedtime    No AM insulin      Before Your Surgery      Call your surgeon if there is any change in your health. This includes signs of a cold or flu (such as a sore throat, runny nose, cough, rash or fever).    Do not smoke, drink alcohol or take over the counter medicine (unless your surgeon or primary care doctor tells you to) for the 24 hours before and after surgery.    If you take prescribed drugs: Follow your doctor s orders about which medicines to take and which to stop until after surgery.    Eating and drinking prior to surgery: follow the instructions from your surgeon    Take a shower or bath the night before surgery. Use the soap your surgeon gave you to gently clean your skin. If you do not have soap from your surgeon, use your regular soap. Do not shave or scrub the surgery site.  Wear clean pajamas and have clean sheets on your bed.

## 2019-11-01 LAB
ANION GAP SERPL CALCULATED.3IONS-SCNC: 7 MMOL/L (ref 3–14)
BUN SERPL-MCNC: 19 MG/DL (ref 7–30)
CALCIUM SERPL-MCNC: 9 MG/DL (ref 8.5–10.1)
CHLORIDE SERPL-SCNC: 104 MMOL/L (ref 94–109)
CO2 SERPL-SCNC: 27 MMOL/L (ref 20–32)
CREAT SERPL-MCNC: 0.85 MG/DL (ref 0.52–1.04)
GFR SERPL CREATININE-BSD FRML MDRD: 76 ML/MIN/{1.73_M2}
GLUCOSE SERPL-MCNC: 158 MG/DL (ref 70–99)
MAGNESIUM SERPL-MCNC: 2 MG/DL (ref 1.6–2.3)
POTASSIUM SERPL-SCNC: 4.5 MMOL/L (ref 3.4–5.3)
SODIUM SERPL-SCNC: 138 MMOL/L (ref 133–144)

## 2019-11-22 ENCOUNTER — OFFICE VISIT (OUTPATIENT)
Dept: FAMILY MEDICINE | Facility: CLINIC | Age: 54
End: 2019-11-22
Payer: COMMERCIAL

## 2019-11-22 VITALS
HEART RATE: 102 BPM | DIASTOLIC BLOOD PRESSURE: 76 MMHG | OXYGEN SATURATION: 99 % | SYSTOLIC BLOOD PRESSURE: 120 MMHG | BODY MASS INDEX: 26.78 KG/M2 | TEMPERATURE: 98.8 F | WEIGHT: 156 LBS

## 2019-11-22 DIAGNOSIS — L72.3 SEBACEOUS CYST: Primary | ICD-10-CM

## 2019-11-22 PROCEDURE — 11402 EXC TR-EXT B9+MARG 1.1-2 CM: CPT | Performed by: FAMILY MEDICINE

## 2019-11-22 NOTE — PROGRESS NOTES
Subjective: Lynn Thompson a 54 year old female who presents today for lesion removal. The lesion is located on the upper back,  and measures 1.5cm.  She denies other significant symptoms on ROS. Medications reviewed.    Objective: The area was prepped and appropriately anesthetized. Using the usual technique, cyst incision and removal was performed. The total area excised, including lesion and margins was 2cm. An appropriate  dressing was applied.  The procedure was well tolerated and without complications. Specimen was not sent.    Assessment: sebaceous cyst    Plan: Follow up: Return for suture removal in 10 days. Wound care instructions provided.  Patient was instructed to be alert for any signs of cutaneous infection.    Maryana Brooks MD  [facility]

## 2019-11-22 NOTE — PROGRESS NOTES
"Subjective     Lynn Thompson is a 54 year old female who presents to clinic today for the following health issues:    HPI   cyst  Onset: 30 years    Description:   Location: back  base of neck  Number of warts: 1  Painful: YES    Accompanying Signs & Symptoms:  Signs of infection: no     History:   History of trauma: no   Prior warts: no     Therapies Tried and outcome: none      {HIST REVIEW/ LINKS 2 (Optional):851628}    Reviewed and updated as needed this visit by Provider         Review of Systems   {ROS COMP (Optional):690306}      Objective    LMP 12/19/2013   There is no height or weight on file to calculate BMI.  Physical Exam   {Exam List (Optional):352048}    {Diagnostic Test Results (Optional):575750::\"Diagnostic Test Results:\",\"Labs reviewed in Epic\"}        {PROVIDER CHARTING PREFERENCE:170391}    "

## 2019-12-10 DIAGNOSIS — E10.9 DIABETES MELLITUS TYPE 1 (H): ICD-10-CM

## 2020-01-03 ENCOUNTER — OFFICE VISIT (OUTPATIENT)
Dept: URGENT CARE | Facility: URGENT CARE | Age: 55
End: 2020-01-03
Payer: COMMERCIAL

## 2020-01-03 VITALS
TEMPERATURE: 98.1 F | OXYGEN SATURATION: 99 % | HEART RATE: 74 BPM | DIASTOLIC BLOOD PRESSURE: 85 MMHG | SYSTOLIC BLOOD PRESSURE: 148 MMHG

## 2020-01-03 DIAGNOSIS — J01.90 ACUTE NON-RECURRENT SINUSITIS, UNSPECIFIED LOCATION: Primary | ICD-10-CM

## 2020-01-03 PROCEDURE — 99213 OFFICE O/P EST LOW 20 MIN: CPT | Performed by: NURSE PRACTITIONER

## 2020-01-03 RX ORDER — LEVONORGESTREL AND ETHINYL ESTRADIOL 0.15-0.03
1 KIT ORAL
Status: ON HOLD | COMMUNITY
Start: 2010-09-23 | End: 2020-06-09

## 2020-01-03 RX ORDER — HYDROXYZINE HYDROCHLORIDE 25 MG/1
25 TABLET, FILM COATED ORAL DAILY PRN
Refills: 0 | COMMUNITY
Start: 2019-07-25

## 2020-01-03 NOTE — PROGRESS NOTES
SUBJECTIVE:   Lynn Thompson is a 54 year old female presenting with a chief complaint of hoarse voice, facial pain/pressure and headache.  Onset of symptoms was 7 day(s) ago.  Course of illness is worsening.    Severity moderate  Current and Associated symptoms: fever, hoarse voice, facial pain/pressure and headache  Treatment measures tried include Decongestants.  Predisposing factors include history of organ transplant and spleen removal.    Past Medical History:   Diagnosis Date     Abdominal pain, epigastric 11/05, ongoing; goes to pain clinic    probable recurrent spasm sphincter of Oddi causing biliary colic pains      Benign paroxysmal positional vertigo     occ.      Calculus of kidney 5/05    x1 on L side passed, several stones.  Has been tested for oxalate.     Chronic abdominal pain 7/17/2013     Chronic pancreatitis (H) 7/17/2013     Depressive disorder, not elsewhere classified     also occ panic spells     Diabetes (H)     post pancreatectomy     Dyspepsia and other specified disorders of function of stomach 6/99    H. 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    ERCP with Stent of CBD by Fernandez @ [facility] with sx relief     Spasm of sphincter of Oddi     surgical + endoscopic stenting of pancreatic duct @ [facility] 5/23/06     Thyroid nodule 11/1/2016     Ureteral calculus 10/2/2012     Vaccination not carried out      Current Outpatient Medications   Medication Sig Dispense Refill     ACETAMINOPHEN PO Take 325 mg by mouth every 6 hours as needed for pain        amylase-lipase-protease (VIOKACE) 54129 units TABS tablet Take 4-5 caps with meals and 1-2 with snacks 600 tablet 5     blood glucose (BILL MICROLET 2) lancing device Use to test blood sugars 6 times daily or as directed. 1 each 11     blood glucose monitoring (BILL CONTOUR NEXT) test strip Check BS 4-6 times a day 2 Box 6     blood glucose monitoring (NO BRAND SPECIFIED) test strip        CLONIDINE HCL PO Take 0.1 mg by mouth 2 times daily as needed       DULoxetine (CYMBALTA) 30 MG capsule Take 1 capsule (30 mg) by mouth 2 times daily Fill at patient request. 180 capsule 3     escitalopram (LEXAPRO) 10 MG tablet 20 mg   2     estradiol (ESTRACE) 0.1 MG/GM vaginal cream PLACE 2 GRAMS VAGINALLY TWICE A WEEK 42.5 g 11     Estradiol-Norethindrone Acet 0.5-0.1 MG TABS Take 1 tablet by mouth daily 84 tablet 3     gabapentin (NEURONTIN) 300 MG capsule        glucose 40 % GEL Take 15-30 g by mouth every 15 minutes as needed. 1 Tube 11     hydrOXYzine (ATARAX) 25 MG tablet TAKE 1-2 TABLETS BY MOUTH 2 TIMES DAILY AS NEEDED FOR ANXIETY  0     HydrOXYzine Pamoate (VISTARIL PO) Take 25 mg by mouth every 6 hours as needed for itching       Injection Device for insulin (NOVOPEN ECHO) MODESTA Use to inject insulin subcutaneously four times daily 1 each 1     Injection Device for insulin (NOVOPEN MixgarHUNTER,) MODESTA 1 Device Inject 1 unit (which is marked as a 1/2 unit on the pen itself)  as needed when eating carb heavy meals.       insulin aspart (NOVOLOG PENFILL) 100 UNITS/ML vial Inject 0.5-2 units 4 times daily 3 mL 3     insulin glargine (LANTUS SOLOSTAR) 100 UNIT/ML pen Inject 5 Units Subcutaneous every evening 15 mL 3     insulin pen needle (BD MAHESH U/F) 32G X 4 MM Use up to 3 times daily as needed for insulin dosing 100 each prn     levonorgestrel-ethinyl estradiol (SEASONALE) 0.15-0.03 MG tablet Take 1 tablet by mouth       levothyroxine (SYNTHROID/LEVOTHROID) 88 MCG tablet Take 1 tablet (88 mcg) by mouth daily 90 tablet 3      loratadine (CLARITIN) 10 MG tablet Take 10 mg by mouth daily as needed        modafinil (PROVIGIL) 200 MG tablet Take 2 tablets by mouth Once a Day       omeprazole (PRILOSEC) 40 MG DR capsule Take 1 capsule (40 mg) by mouth 2 times daily Take 30-60 minutes before a meal. 180 capsule 3     ondansetron (ZOFRAN ODT) 4 MG ODT tab Take 1-2 tablets (4-8 mg) by mouth 3 times daily (before meals) 20 tablet 3     ranitidine (ZANTAC) 150 MG tablet Take 1 tablet (150 mg) by mouth 2 times daily 180 tablet 3     Sharps Container MISC For insulin needles 1 each prn     traZODone (DESYREL) 50 MG tablet Take 1 tablet (50 mg) by mouth At Bedtime 90 tablet 3     UNABLE TO FIND MEDICATION NAME: Dopatone Active       UNABLE TO FIND MEDICATION NAME: magnezyme       UNABLE TO FIND MEDICATION NAME: immunoG PRP       UNABLE TO FIND MEDICATION NAME: GI-Synergy       Social History     Tobacco Use     Smoking status: Never Smoker     Smokeless tobacco: Never Used   Substance Use Topics     Alcohol use: No     Alcohol/week: 0.0 standard drinks       ROS:  Review of systems negative except as stated above.    OBJECTIVE:  BP (!) 148/85 (BP Location: Right arm, Patient Position: Sitting, Cuff Size: Adult Regular)   Pulse 74   Temp 98.1  F (36.7  C) (Tympanic)   LMP 12/19/2013   SpO2 99%   Breastfeeding No   GENERAL APPEARANCE: healthy, alert and no distress  EYES: EOMI,  PERRL, conjunctiva clear  HENT: TM's normal bilaterally, nasal turbinates erythematous, swollen, rhinorrhea grey, oral mucous membranes moist, no erythema noted and maxillary sinus tenderness   NECK: supple, nontender, no lymphadenopathy  RESP: lungs clear to auscultation - no rales, rhonchi or wheezes  CV: regular rates and rhythm, normal S1 S2, no murmur noted    ASSESSMENT:  Sinusitis    PLAN:  Tylenol, Fluids, Rest and Saline nasal spray and Augmentin 875 mb BID for 10 days.   See orders in Epic

## 2020-01-03 NOTE — PATIENT INSTRUCTIONS
Patient Education     Sinusitis (Antibiotic Treatment)    The sinuses are air-filled spaces within the bones of the face. They connect to the inside of the nose. Sinusitis is an inflammation of the tissue that lines the sinuses. Sinusitis can occur during a cold. It can also happen due to allergies to pollens and other particles in the air. Sinusitis can cause symptoms of sinus congestion and a feeling of fullness. A sinus infection causes fever, headache, and facial pain. There is often green or yellow fluid draining from the nose or into the back of the throat (post-nasal drip). You have been given antibiotics to treat this condition.  Home care    Take the full course of antibiotics as instructed. Do not stop taking them, even when you feel better.    Drink plenty of water, hot tea, and other liquids. This may help thin nasal mucus. It also may help your sinuses drain fluids.    Heat may help soothe painful areas of your face. Use a towel soaked in hot water. Or,  the shower and direct the warm spray onto your face. Using a vaporizer along with a menthol rub at night may also help soothe symptoms.     An expectorant with guaifenesin may help thin nasal mucus and help your sinuses drain fluids.    You can use an over-the-counter decongestant, unless a similar medicine was prescribed to you. Nasal sprays work the fastest. Use one that contains phenylephrine or oxymetazoline. First blow your nose gently. Then use the spray. Do not use these medicines more often than directed on the label. If you do, your symptoms may get worse. You may also take pills that contain pseudoephedrine. Don t use products that combine multiple medicines. This is because side effects may be increased. Read labels. You can also ask the pharmacist for help. (People with high blood pressure should not use decongestants. They can raise blood pressure.)    Over-the-counter antihistamines may help if allergies contributed to your  sinusitis.      Do not use nasal rinses or irrigation during an acute sinus infection, unless your healthcare provider tells you to. Rinsing may spread the infection to other areas in your sinuses.    Use acetaminophen or ibuprofen to control pain, unless another pain medicine was prescribed to you. If you have chronic liver or kidney disease or ever had a stomach ulcer, talk with your healthcare provider before using these medicines. (Aspirin should never be taken by anyone under age 18 who is ill with a fever. It may cause severe liver damage.)    Don't smoke. This can make symptoms worse.  Follow-up care  Follow up with your healthcare provider or our staff if you are not better in 1 week.  When to seek medical advice  Call your healthcare provider if any of these occur:    Facial pain or headache that gets worse    Stiff neck    Unusual drowsiness or confusion    Swelling of your forehead or eyelids    Vision problems, such as blurred or double vision    Fever of 100.4 F (38 C) or higher, or as directed by your healthcare provider    Seizure    Breathing problems    Symptoms don't go away in 10 days  Prevention  Here are steps you can take to help prevent an infection:    Keep good hand washing habits.    Don t have close contact with people who have sore throats, colds, or other upper respiratory infections.    Don t smoke, and stay away from secondhand smoke.    Stay up to date with of your vaccines.  Date Last Reviewed: 11/1/2017 2000-2019 [employer]. [address] [place] [address], [place] [address]. All rights reserved. This information is not intended as a substitute for professional medical care. Always follow your healthcare professional's instructions.

## 2020-02-06 ENCOUNTER — OFFICE VISIT (OUTPATIENT)
Dept: FAMILY MEDICINE | Facility: CLINIC | Age: 55
End: 2020-02-06
Payer: COMMERCIAL

## 2020-02-06 VITALS
TEMPERATURE: 98.8 F | SYSTOLIC BLOOD PRESSURE: 143 MMHG | OXYGEN SATURATION: 98 % | BODY MASS INDEX: 26.5 KG/M2 | WEIGHT: 155.2 LBS | RESPIRATION RATE: 16 BRPM | HEIGHT: 64 IN | DIASTOLIC BLOOD PRESSURE: 82 MMHG | HEART RATE: 68 BPM

## 2020-02-06 DIAGNOSIS — Z01.818 PREOP GENERAL PHYSICAL EXAM: Primary | ICD-10-CM

## 2020-02-06 LAB
BASOPHILS # BLD AUTO: 0.1 10E9/L (ref 0–0.2)
BASOPHILS NFR BLD AUTO: 0.6 %
DIFFERENTIAL METHOD BLD: NORMAL
EOSINOPHIL # BLD AUTO: 0.1 10E9/L (ref 0–0.7)
EOSINOPHIL NFR BLD AUTO: 1.1 %
ERYTHROCYTE [DISTWIDTH] IN BLOOD BY AUTOMATED COUNT: 14.4 % (ref 10–15)
HBA1C MFR BLD: 6.9 % (ref 0–5.6)
HCT VFR BLD AUTO: 37.9 % (ref 35–47)
HGB BLD-MCNC: 12 G/DL (ref 11.7–15.7)
LYMPHOCYTES # BLD AUTO: 2.3 10E9/L (ref 0.8–5.3)
LYMPHOCYTES NFR BLD AUTO: 26.6 %
MCH RBC QN AUTO: 30.1 PG (ref 26.5–33)
MCHC RBC AUTO-ENTMCNC: 31.7 G/DL (ref 31.5–36.5)
MCV RBC AUTO: 95 FL (ref 78–100)
MONOCYTES # BLD AUTO: 1.1 10E9/L (ref 0–1.3)
MONOCYTES NFR BLD AUTO: 12.7 %
NEUTROPHILS # BLD AUTO: 5.1 10E9/L (ref 1.6–8.3)
NEUTROPHILS NFR BLD AUTO: 59 %
PLATELET # BLD AUTO: 232 10E9/L (ref 150–450)
RBC # BLD AUTO: 3.99 10E12/L (ref 3.8–5.2)
WBC # BLD AUTO: 8.7 10E9/L (ref 4–11)

## 2020-02-06 PROCEDURE — 83036 HEMOGLOBIN GLYCOSYLATED A1C: CPT | Performed by: FAMILY MEDICINE

## 2020-02-06 PROCEDURE — 99214 OFFICE O/P EST MOD 30 MIN: CPT | Performed by: FAMILY MEDICINE

## 2020-02-06 PROCEDURE — 36415 COLL VENOUS BLD VENIPUNCTURE: CPT | Performed by: FAMILY MEDICINE

## 2020-02-06 PROCEDURE — 80053 COMPREHEN METABOLIC PANEL: CPT | Performed by: FAMILY MEDICINE

## 2020-02-06 PROCEDURE — 87389 HIV-1 AG W/HIV-1&-2 AB AG IA: CPT | Performed by: FAMILY MEDICINE

## 2020-02-06 PROCEDURE — 85025 COMPLETE CBC W/AUTO DIFF WBC: CPT | Performed by: FAMILY MEDICINE

## 2020-02-06 SDOH — ECONOMIC STABILITY: FOOD INSECURITY: WITHIN THE PAST 12 MONTHS, YOU WORRIED THAT YOUR FOOD WOULD RUN OUT BEFORE YOU GOT MONEY TO BUY MORE.: SOMETIMES TRUE

## 2020-02-06 SDOH — HEALTH STABILITY: MENTAL HEALTH: HOW OFTEN DO YOU HAVE A DRINK CONTAINING ALCOHOL?: NEVER

## 2020-02-06 SDOH — SOCIAL STABILITY: SOCIAL NETWORK: HOW OFTEN DO YOU GET TOGETHER WITH FRIENDS OR RELATIVES?: ONCE A WEEK

## 2020-02-06 SDOH — HEALTH STABILITY: MENTAL HEALTH: HOW MANY STANDARD DRINKS CONTAINING ALCOHOL DO YOU HAVE ON A TYPICAL DAY?: PATIENT DECLINED

## 2020-02-06 SDOH — SOCIAL STABILITY: SOCIAL NETWORK
DO YOU BELONG TO ANY CLUBS OR ORGANIZATIONS SUCH AS CHURCH GROUPS UNIONS, FRATERNAL OR ATHLETIC GROUPS, OR SCHOOL GROUPS?: YES

## 2020-02-06 SDOH — HEALTH STABILITY: PHYSICAL HEALTH: ON AVERAGE, HOW MANY MINUTES DO YOU ENGAGE IN EXERCISE AT THIS LEVEL?: 0 MIN

## 2020-02-06 SDOH — SOCIAL STABILITY: SOCIAL NETWORK
IN A TYPICAL WEEK, HOW MANY TIMES DO YOU TALK ON THE PHONE WITH FAMILY, FRIENDS, OR NEIGHBORS?: MORE THAN THREE TIMES A WEEK

## 2020-02-06 SDOH — SOCIAL STABILITY: SOCIAL NETWORK: HOW OFTEN DO YOU ATTEND CHURCH OR RELIGIOUS SERVICES?: MORE THAN 4 TIMES PER YEAR

## 2020-02-06 SDOH — ECONOMIC STABILITY: TRANSPORTATION INSECURITY
IN THE PAST 12 MONTHS, HAS THE LACK OF TRANSPORTATION KEPT YOU FROM MEDICAL APPOINTMENTS OR FROM GETTING MEDICATIONS?: NO

## 2020-02-06 SDOH — HEALTH STABILITY: MENTAL HEALTH: HOW OFTEN DO YOU HAVE 6 OR MORE DRINKS ON ONE OCCASION?: NEVER

## 2020-02-06 SDOH — SOCIAL STABILITY: SOCIAL NETWORK: ARE YOU MARRIED, WIDOWED, DIVORCED, SEPARATED, NEVER MARRIED, OR LIVING WITH A PARTNER?: MARRIED

## 2020-02-06 SDOH — ECONOMIC STABILITY: INCOME INSECURITY: HOW HARD IS IT FOR YOU TO PAY FOR THE VERY BASICS LIKE FOOD, HOUSING, MEDICAL CARE, AND HEATING?: HARD

## 2020-02-06 SDOH — ECONOMIC STABILITY: FOOD INSECURITY: WITHIN THE PAST 12 MONTHS, THE FOOD YOU BOUGHT JUST DIDN'T LAST AND YOU DIDN'T HAVE MONEY TO GET MORE.: NEVER TRUE

## 2020-02-06 SDOH — HEALTH STABILITY: MENTAL HEALTH
STRESS IS WHEN SOMEONE FEELS TENSE, NERVOUS, ANXIOUS, OR CAN'T SLEEP AT NIGHT BECAUSE THEIR MIND IS TROUBLED. HOW STRESSED ARE YOU?: VERY MUCH

## 2020-02-06 SDOH — SOCIAL STABILITY: SOCIAL NETWORK: HOW OFTEN DO YOU ATTENT MEETINGS OF THE CLUB OR ORGANIZATION YOU BELONG TO?: MORE THAN 4 TIMES PER YEAR

## 2020-02-06 SDOH — HEALTH STABILITY: PHYSICAL HEALTH: ON AVERAGE, HOW MANY DAYS PER WEEK DO YOU ENGAGE IN MODERATE TO STRENUOUS EXERCISE (LIKE A BRISK WALK)?: 0 DAYS

## 2020-02-06 SDOH — ECONOMIC STABILITY: TRANSPORTATION INSECURITY
IN THE PAST 12 MONTHS, HAS LACK OF TRANSPORTATION KEPT YOU FROM MEETINGS, WORK, OR FROM GETTING THINGS NEEDED FOR DAILY LIVING?: NO

## 2020-02-06 ASSESSMENT — PATIENT HEALTH QUESTIONNAIRE - PHQ9
5. POOR APPETITE OR OVEREATING: MORE THAN HALF THE DAYS
SUM OF ALL RESPONSES TO PHQ QUESTIONS 1-9: 8

## 2020-02-06 ASSESSMENT — ANXIETY QUESTIONNAIRES
3. WORRYING TOO MUCH ABOUT DIFFERENT THINGS: NEARLY EVERY DAY
5. BEING SO RESTLESS THAT IT IS HARD TO SIT STILL: MORE THAN HALF THE DAYS
GAD7 TOTAL SCORE: 14
6. BECOMING EASILY ANNOYED OR IRRITABLE: MORE THAN HALF THE DAYS
1. FEELING NERVOUS, ANXIOUS, OR ON EDGE: NEARLY EVERY DAY
IF YOU CHECKED OFF ANY PROBLEMS ON THIS QUESTIONNAIRE, HOW DIFFICULT HAVE THESE PROBLEMS MADE IT FOR YOU TO DO YOUR WORK, TAKE CARE OF THINGS AT HOME, OR GET ALONG WITH OTHER PEOPLE: SOMEWHAT DIFFICULT
2. NOT BEING ABLE TO STOP OR CONTROL WORRYING: MORE THAN HALF THE DAYS
7. FEELING AFRAID AS IF SOMETHING AWFUL MIGHT HAPPEN: NOT AT ALL

## 2020-02-06 ASSESSMENT — MIFFLIN-ST. JEOR: SCORE: 1266.04

## 2020-02-06 NOTE — PROGRESS NOTES
[facility]  [id number] [place] [id number] [phone]  Dept: [phone]    PRE-OP EVALUATION:  Today's date: 2020    Lynn Thompson (: 1965) presents for pre-operative evaluation assessment as requested by Dr. Alberts.  She requires evaluation and anesthesia risk assessment prior to undergoing surgery/procedure for treatment of BILATERAL BROW PTOSIS REPAIR .    Fax number for surgical facility: [phone]  Primary Physician: Maryana Brooks  Type of Anesthesia Anticipated: Monitor Anesthesia Care    Patient has a Health Care Directive or Living Will:  NO    Preop Questions 2020   Who is doing your surgery? Dr Denise Alberts   What are you having done? Marcelino   Date of Surgery/Procedure: 2020   Facility or Hospital where procedure/surgery will be performed: [facility]   1.  Do you have a history of Heart attack, stroke, stent, coronary bypass surgery, or other heart surgery? No   2.  Do you ever have any pain or discomfort in your chest? No   3.  Do you have a history of  Heart Failure? No   4.   Are you troubled by shortness of breath when:  walking on a level surface, or up a slight hill, or at night? No   5.  Do you currently have a cold, bronchitis or other respiratory infection? No   6.  Do you have a cough, shortness of breath, or wheezing? No   7.  Do you sometimes get pains in the calves of your legs when you walk? No   8. Do you or anyone in your family have previous history of blood clots? No   9.  Do you or does anyone in your family have a serious bleeding problem such as prolonged bleeding following surgeries or cuts? No   10. Have you ever had problems with anemia or been told to take iron pills? YES -   11. Have you had any abnormal blood loss such as black, tarry or bloody stools, or abnormal vaginal bleeding? YES -    12. Have you ever had a blood transfusion? YES -    13. Have you or any of your relatives ever  had problems with anesthesia? No   14. Do you have sleep apnea, excessive snoring or daytime drowsiness? YES - Provigil   15. Do you have any prosthetic heart valves? No   16. Do you have prosthetic joints? No   17. Is there any chance that you may be pregnant? No         HPI:     HPI related to upcoming procedure: Symptomatic ptosis          MEDICAL HISTORY:     Patient Active Problem List    Diagnosis Date Noted     Health Care Home 05/03/2011     Priority: High     EMERGENCY CARE PLAN  Presenting Problem Signs and Symptoms Treatment Plan    Questions or conerns during clinic hours    I will call the clinic directly     Questions or conerns outside clinic hours    I will call the 24 hour nurse line at [phone]    Patient needs to schedule an appointment    I will call the 24 hour scheduling team at [phone] or clinic directly    Same day treatment     I will call the clinic first, nurse line if after hours, urgent care and express care if needed                            DX V65.8 REPLACED WITH [id number] [facility] CARE HOME (04/08/2013)       Antibiotic-associated diarrhea 08/28/2017     Priority: Medium     Elevated LFTs 08/28/2017     Priority: Medium     E coli bacteremia 08/25/2017     Priority: Medium     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     Priority: Medium     Acquired hypothyroidism 11/03/2016     Priority: Medium     Thyroid nodule 11/01/2016     Priority: Medium     Dysthymia 03/01/2016     Priority: Medium     Anxiety 03/01/2016     Priority: Medium     S/P exploratory laparotomy 12/29/2015     Priority: Medium     BLU (obstructive sleep apnea) 12/23/2015     Priority: Medium     Asplenia 10/24/2014     Priority: Medium     Exocrine pancreatic insufficiency 05/17/2013     Priority: Medium     Chronic pain syndrome 02/23/2013     Priority: Medium     Gastric bypass status for obesity 10/26/2010     Priority: Medium     Iron deficiency anemia secondary to inadequate dietary iron intake  2004     Priority: Medium     relates to gastric bypass        Past Medical History:   Diagnosis Date     Abdominal pain, epigastric , ongoing; goes to pain clinic    probable recurrent spasm sphincter of Oddi causing biliary colic pains      Benign paroxysmal positional vertigo     occ.      Calculus of kidney 5/05    x1 on L side passed, several stones.  Has been tested for oxalate.     Chronic abdominal pain 2013     Chronic pancreatitis (H) 2013     Depressive disorder, not elsewhere classified     also occ panic spells     Diabetes (H)     post pancreatectomy     Dyspepsia and other specified disorders of function of stomach     H. pylori   treated     Headache(784.0)     still periodic HA's ;  often 5X/week     Hypertension 16    Stress related     Iron deficiency anemia secondary to inadequate dietary iron intake     relates to gastric bypass     Other chronic pain      Post-pancreatectomy diabetes (H) 2013     Pyelonephritis, unspecified , 10/8    L side     Regional enteritis of unspecified site     inacive/remission     Sleep apnea     uses Cpap     Spasm of sphincter of Oddi     ERCP with Stent of CBD by Fernandez @ [facility] with sx relief     Spasm of sphincter of Oddi     surgical + endoscopic stenting of pancreatic duct @ [facility] 06     Thyroid nodule 2016     Ureteral calculus 10/2/2012     Vaccination not carried out      Past Surgical History:   Procedure Laterality Date     APPENDECTOMY       C NONSPECIFIC PROCEDURE      R bunion     C NONSPECIFIC PROCEDURE      T & A     C NONSPECIFIC PROCEDURE      partial ileum resection     C NONSPECIFIC PROCEDURE      R ovarian cyst     C NONSPECIFIC PROCEDURE           C NONSPECIFIC PROCEDURE      GALL BLADDER     C NONSPECIFIC PROCEDURE      CBD stent; Dr. Presley     C NONSPECIFIC PROCEDURE   &     colonoscopy     C NONSPECIFIC PROCEDURE      Gastric  bypass     CHOLECYSTECTOMY       COLONOSCOPY       COMBINED CYSTOSCOPY, RETROGRADES, URETEROSCOPY, LASER HOLMIUM LITHOTRIPSY URETER(S), INSERT STENT Right 3/23/2015    Procedure: COMBINED CYSTOSCOPY, RETROGRADES, URETEROSCOPY, LASER HOLMIUM LITHOTRIPSY URETER(S), INSERT STENT;  Surgeon: Kennedi Aldana MD;  Location: [facility] OR     COMBINED CYSTOSCOPY, RETROGRADES, URETEROSCOPY, LASER HOLMIUM LITHOTRIPSY URETER(S), INSERT STENT Right 4/20/2015    Procedure: COMBINED CYSTOSCOPY, RETROGRADES, URETEROSCOPY, LASER HOLMIUM LITHOTRIPSY URETER(S), INSERT STENT;  Surgeon: Kennedi Aldana MD;  Location: [facility] OR     COSMETIC SURGERY  6/24/2002    Tummy tuck     CYSTOSCOPY, RETROGRADES, INSERT STENT URETER(S), COMBINED  10/2/2012    Procedure: COMBINED CYSTOSCOPY, RETROGRADES, INSERT STENT URETER(S);  COMBINED CYSTOSCOPY,  , INSERT LEFT STENT URETER;  Surgeon: Johny Baez MD;  Location: [facility] OR     ENT SURGERY       ESOPHAGOSCOPY, GASTROSCOPY, DUODENOSCOPY (EGD), COMBINED N/A 1/24/2018    Procedure: COMBINED ESOPHAGOSCOPY, GASTROSCOPY, DUODENOSCOPY (EGD);  ESOPHAGOSCOPY, GASTROSCOPY, DUODENOSCOPY (EGD)    ;  Surgeon: Tamir Rodgers MD;  Location:  GI     EXTRACORPOREAL SHOCK WAVE LITHOTRIPSY (ESWL)  10/16/2012    Procedure: EXTRACORPOREAL SHOCK WAVE LITHOTRIPSY (ESWL);  left EXTRACORPOREAL SHOCK WAVE LITHOTRIPSY (ESWL) ;  Surgeon: Johny Baez MD;  Location: [facility] OR     GI SURGERY  12/29/2015    Small bowel obstruction     HC LAP,LYSIS OF ADHESIONS       HERNIA REPAIR  2/2015     LAPAROSCOPIC LYSIS ADHESIONS N/A 2/20/2015    Procedure: LAPAROSCOPIC LYSIS ADHESIONS;  Surgeon: Aaron Early MD;  [facility]: [facility] OR     LAPAROSCOPIC LYSIS ADHESIONS N/A 12/29/2015    Procedure: LAPAROSCOPIC LYSIS ADHESIONS;  Surgeon: Aaron Early MD;  [facility]: [facility] OR     PANCREATECTOMY, TRANSPLANT AUTO ISLET CELL, COMBINED  5/10/2013    Procedure: COMBINED PANCREATECTOMY, TRANSPLANT AUTO ISLET CELL;  Pancreatectomy, Auto  Islet Cell Transplant   hernia repair, jejunostomy tube and liver biopsies with Anesthesia General with block;  Surgeon: Aaron Early MD;  Location: [facility] OR     TRANSPLANT  5/10/13     Current Outpatient Medications   Medication Sig Dispense Refill     ACETAMINOPHEN PO Take 325 mg by mouth every 6 hours as needed for pain       amylase-lipase-protease (VIOKACE) 61898 units TABS tablet Take 4-5 caps with meals and 1-2 with snacks 600 tablet 5     blood glucose (BILL MICROLET 2) lancing device Use to test blood sugars 6 times daily or as directed. 1 each 11     blood glucose monitoring (BILL CONTOUR NEXT) test strip Check BS 4-6 times a day 2 Box 6     blood glucose monitoring (NO BRAND SPECIFIED) test strip        CLONIDINE HCL PO Take 0.1 mg by mouth 2 times daily as needed       DULoxetine (CYMBALTA) 30 MG capsule Take 1 capsule (30 mg) by mouth 2 times daily Fill at patient request. 180 capsule 3     escitalopram (LEXAPRO) 10 MG tablet 20 mg   2     estradiol (ESTRACE) 0.1 MG/GM vaginal cream PLACE 2 GRAMS VAGINALLY TWICE A WEEK 42.5 g 11     Estradiol-Norethindrone Acet 0.5-0.1 MG TABS Take 1 tablet by mouth daily 84 tablet 3     gabapentin (NEURONTIN) 300 MG capsule        glucose 40 % GEL Take 15-30 g by mouth every 15 minutes as needed. 1 Tube 11     hydrOXYzine (ATARAX) 25 MG tablet TAKE 1-2 TABLETS BY MOUTH 2 TIMES DAILY AS NEEDED FOR ANXIETY  0     HydrOXYzine Pamoate (VISTARIL PO) Take 25 mg by mouth every 6 hours as needed for itching       Injection Device for insulin (NOVOPEN ECHO) MODESTA Use to inject insulin subcutaneously four times daily 1 each 1     Injection Device for insulin (NOVOPEN JRHUNTER,) MODESTA 1 Device Inject 1 unit (which is marked as a 1/2 unit on the pen itself)  as needed when eating carb heavy meals.       insulin aspart (NOVOLOG PENFILL) 100 UNITS/ML vial Inject 0.5-2 units 4 times daily 3 mL 3     insulin glargine (LANTUS SOLOSTAR) 100 UNIT/ML pen Inject 5 Units Subcutaneous every  evening 15 mL 3     insulin pen needle (BD MAHESH U/F) 32G X 4 MM Use up to 3 times daily as needed for insulin dosing 100 each prn     levonorgestrel-ethinyl estradiol (SEASONALE) 0.15-0.03 MG tablet Take 1 tablet by mouth       levothyroxine (SYNTHROID/LEVOTHROID) 88 MCG tablet Take 1 tablet (88 mcg) by mouth daily 90 tablet 3     loratadine (CLARITIN) 10 MG tablet Take 10 mg by mouth daily as needed        modafinil (PROVIGIL) 200 MG tablet Take 2 tablets by mouth Once a Day       omeprazole (PRILOSEC) 40 MG DR capsule Take 1 capsule (40 mg) by mouth 2 times daily Take 30-60 minutes before a meal. 180 capsule 3     ondansetron (ZOFRAN ODT) 4 MG ODT tab Take 1-2 tablets (4-8 mg) by mouth 3 times daily (before meals) 20 tablet 3     ranitidine (ZANTAC) 150 MG tablet Take 1 tablet (150 mg) by mouth 2 times daily 180 tablet 3     Sharps Container MISC For insulin needles 1 each prn     traZODone (DESYREL) 50 MG tablet Take 1 tablet (50 mg) by mouth At Bedtime 90 tablet 3     UNABLE TO FIND MEDICATION NAME: Dopatone Active       UNABLE TO FIND MEDICATION NAME: magnezyme       UNABLE TO FIND MEDICATION NAME: immunoG PRP       UNABLE TO FIND MEDICATION NAME: GI-Synergy       OTC products:     Allergies   Allergen Reactions     Povidone Iodine Hives     Causes skin to blister     Corticosteroids Other (See Comments)     All oral,IV and injectable steroids are contraindicated (unless in life threatening situations) in Islet Auto transplant recipients. They can cause irreversible loss of islet cell function. Please contact patients transplant care coordinator JONATHAN Carvalho RN at /pager   and Endocrinologist prior to administration.      Naproxen      Other reaction(s): Abdominal Pain  Pt allergic to Naprosyn     Nsaids      naprosyn = GI upset     Povidone Iodine      blisters     Sulfasalazine Nausea and Nausea and Vomiting      Latex Allergy: NO    Social History     Tobacco Use     Smoking status:  "Never Smoker     Smokeless tobacco: Never Used   Substance Use Topics     Alcohol use: No     Alcohol/week: 0.0 standard drinks     Frequency: Never     Drinks per session: Patient refused     Binge frequency: Never     History   Drug Use No       REVIEW OF SYSTEMS:   CONSTITUTIONAL: NEGATIVE for fever, chills, change in weight  ENT/MOUTH: NEGATIVE for ear, mouth and throat problems  RESP: NEGATIVE for significant cough or SOB  CV: NEGATIVE for chest pain, palpitations or peripheral edema  No bleeding no bruising  EXAM:   BP (!) 143/82 (BP Location: Right arm, Patient Position: Sitting, Cuff Size: Adult Regular)   Pulse 68   Temp 98.8  F (37.1  C) (Oral)   Resp 16   Ht 1.613 m (5' 3.5\")   Wt 70.4 kg (155 lb 3.2 oz)   LMP 12/19/2013   SpO2 98%   Breastfeeding No   BMI 27.06 kg/m    Clear ears  Moist mucous membranes  Eyes per ophthalmology  No cervical nodes  Clear chest  Regular rhythm without murmur  Abdomen benign  Extremities without edema    DIAGNOSTICS:   No labs or EKG required for low risk surgery (cataract, skin procedure, breast biopsy, etc)    Recent Labs   Lab Test 10/31/19  0956 09/05/19 05/16/19  0655  05/10/16  2019  05/10/13  1950   HGB 12.3  --  11.2*   < > 12.0   < > 8.1*     --  320   < > 383   < > 181   INR  --   --   --   --  1.03  --  1.29*     --  142   < > 136   < > 140   POTASSIUM 4.5  --  3.5   < > 4.5   < > 4.4   CR 0.85  --  0.77   < > 0.72   < > 0.76   A1C  --  7.6* 8.5*   < >  --    < >  --     < > = values in this interval not displayed.        IMPRESSION:   Reason for surgery/procedure: Vision obscuring ptosis  Diagnosis/reason for consult: assess perioperative risk      The proposed surgical procedure is considered LOW risk.    REVISED CARDIAC RISK INDEX  The patient has the following serious cardiovascular risks for perioperative complications such as (MI, PE, VFib and 3  AV Block):  Diabetes Mellitus (on Insulin)  INTERPRETATION: 1 risks: Class II (low risk - " 0.9% complication rate)    The patient has the following additional risks for perioperative complications:        ICD-10-CM    1. Preop general physical exam Z01.818        RECOMMENDATIONS:     Patient will take 2 units of Lantus the night before procedure, and hold her short acting insulin.  She will take no other agents.  Otherwise she is approved to proceed with surgical procedure without further diagnostic intervention        APPROVAL GIVEN to proceed with proposed procedure, without further diagnostic evaluation       Signed Electronically by: Norbert Kraus MD    Copy of this evaluation report is provided to requesting physician.    [facility] Preop Guidelines    Revised Cardiac Risk Index

## 2020-02-06 NOTE — PATIENT INSTRUCTIONS
Before Your Surgery      Call your surgeon if there is any change in your health. This includes signs of a cold or flu (such as a sore throat, runny nose, cough, rash or fever).    Do not smoke, drink alcohol or take over the counter medicine (unless your surgeon or primary care doctor tells you to) for the 24 hours before and after surgery.    If you take prescribed drugs: Follow your doctor s orders about which medicines to take and which to stop until after surgery.    Eating and drinking prior to surgery: follow the instructions from your surgeon    Take a shower or bath the night before surgery. Use the soap your surgeon gave you to gently clean your skin. If you do not have soap from your surgeon, use your regular soap. Do not shave or scrub the surgery site.  Wear clean pajamas and have clean sheets on your bed.     Take 2 units Lantus night before    No short acting Day of surgery

## 2020-02-07 LAB
ALBUMIN SERPL-MCNC: 3.3 G/DL (ref 3.4–5)
ALP SERPL-CCNC: 106 U/L (ref 40–150)
ALT SERPL W P-5'-P-CCNC: 44 U/L (ref 0–50)
ANION GAP SERPL CALCULATED.3IONS-SCNC: 2 MMOL/L (ref 3–14)
AST SERPL W P-5'-P-CCNC: 41 U/L (ref 0–45)
BILIRUB SERPL-MCNC: 0.2 MG/DL (ref 0.2–1.3)
BUN SERPL-MCNC: 18 MG/DL (ref 7–30)
CALCIUM SERPL-MCNC: 8.8 MG/DL (ref 8.5–10.1)
CHLORIDE SERPL-SCNC: 111 MMOL/L (ref 94–109)
CO2 SERPL-SCNC: 29 MMOL/L (ref 20–32)
CREAT SERPL-MCNC: 0.81 MG/DL (ref 0.52–1.04)
GFR SERPL CREATININE-BSD FRML MDRD: 81 ML/MIN/{1.73_M2}
GLUCOSE SERPL-MCNC: 113 MG/DL (ref 70–99)
HIV 1+2 AB+HIV1 P24 AG SERPL QL IA: NONREACTIVE
POTASSIUM SERPL-SCNC: 3.6 MMOL/L (ref 3.4–5.3)
PROT SERPL-MCNC: 6.8 G/DL (ref 6.8–8.8)
SODIUM SERPL-SCNC: 142 MMOL/L (ref 133–144)

## 2020-02-07 ASSESSMENT — ANXIETY QUESTIONNAIRES: GAD7 TOTAL SCORE: 14

## 2020-04-13 ENCOUNTER — TELEPHONE (OUTPATIENT)
Dept: TRANSPLANT | Facility: CLINIC | Age: 55
End: 2020-04-13

## 2020-04-13 NOTE — TELEPHONE ENCOUNTER
Patient Call: General  Route to LPN    Reason for call: pt had a death in the family unable to keep Thurs appointment with Dr Cadena     Call back needed? Yes to reschedule at a later time     Return Call Needed  Same as documented in contacts section  When to return call?: Greater than one day: Route standard priority

## 2020-04-16 ENCOUNTER — VIRTUAL VISIT (OUTPATIENT)
Dept: TRANSPLANT | Facility: CLINIC | Age: 55
End: 2020-04-16
Attending: PEDIATRICS
Payer: COMMERCIAL

## 2020-04-16 DIAGNOSIS — E13.9 POST-PANCREATECTOMY DIABETES (H): Primary | ICD-10-CM

## 2020-04-16 DIAGNOSIS — E89.1 POST-PANCREATECTOMY DIABETES (H): Primary | ICD-10-CM

## 2020-04-16 DIAGNOSIS — Z90.410 POST-PANCREATECTOMY DIABETES (H): Primary | ICD-10-CM

## 2020-04-16 ASSESSMENT — PAIN SCALES - GENERAL: PAINLEVEL: MILD PAIN (2)

## 2020-04-16 NOTE — PATIENT INSTRUCTIONS
I am very happy with the current diabetes regimen and BG numbers.  There are very few highs or lows.     1)  Continue Lantus 6 units     2) Continue the same dose of Novolog to 0.5 unit per 10 grams                     What your blood glucose is before eating:   How much you plan to eat: 80- 150 mg/dL 151- 250 mg/dL 251 to 350 mg/dL   Less than 10 grams carb 0 0.5 1   10-19 grams carb 0.5 1 1.5   20- 29 grams carb 1 1.5 2   30- 39 grams carb 1.5 2 2.5   40- 49 grams carb 2 2.5 3   50 or more grams carb  2.5 3 3.5        3)  I would get a different magnesium supplement (400 mg magnesium oxide or mag citrate) from the drug store and continue taking this, as it seemed to really help you previously.    4)  We talked about small amount of blood on stool that you think is rectal prolapse from being off usual GI supplements.  We talked about when  You would need to go in-- dark black stool or larger amounts of blood concerning for acute blood loss.      Follow up:  Thursday Aug 20 at 1:40.

## 2020-04-16 NOTE — PROGRESS NOTES
"Lynn Thompson is a 55 year old female who is being evaluated via a billable video visit.      The patient has been notified of following:     \"This video visit will be conducted via a call between you and your physician/provider. We have found that certain health care needs can be provided without the need for an in-person physical exam.  This service lets us provide the care you need with a video conversation.  If a prescription is necessary we can send it directly to your pharmacy.  If lab work is needed we can place an order for that and you can then stop by our lab to have the test done at a later time.    Video visits are billed at different rates depending on your insurance coverage.  Please reach out to your insurance provider with any questions.    If during the course of the call the physician/provider feels a video visit is not appropriate, you will not be charged for this service.\"    Patient has given verbal consent for Video visit? Yes    How would you like to obtain your AVS? Mishel    Patient would like the video invitation sent by: Send to e-mail at: [email or web address]      Video Start Time: 1:05pm    Additional provider notes:  HPI:  Lynn is a 55 year old female here for follow up o total pancreatectomy, islet cell autotransplant, splenectomy, liver biopsy (needle core), choledochoduodenostomy, feeding jejunostomy performed on 5/10/2013.  At the time of the procedure, the patient received 178,100 IEQ, or 3,209 IEQ/kg body weight. She has had two subsequent exploratory laparatomy for small bowel obstruction. Other notable endocrine history includes a complex cystic nodule in mid right thyroid lobe with 1 cm maximum diameter (saw Dr Adorno in 11/2016) which needs annual follow up U/S in Nov 2017, and mild hypothyroidism followed by PCP.  She had an episode of cholangitis and was hospitalized for 4 days 8/24/17 (fevers, RUQ pain, + Ecoli blood cx).     She was on NO insulin at her 1 " year, 2 year, 3 year, 4 year transplant anniversaries and restart insulin just after 4 years post-tx, insulin restart date of  6/6/2017.  She is continuing to wean narcotics, on a suboxone wean, now on a 1/8 patch (Suboxone 1- 0.5 mg film) daily, with no other narcotics.      At today's visit, I saw Lynn by video visit.  We encountered some technical difficulties with [employer] so also used [employer] to complete the visit.     1)  Diabetes:  Lynn continues to have partial islet graft function.  last visit she was having post meal hyperglycemia, this is much improved with dose changes.  She had some highs a few weeks ago but relates this to extra stress around covid epidemic and some missed premeal check/bolus.     Diabetes history:  Current insulin regimen:  Lantus 6 units per day  Novolog 0.5 units per 10g, and correction scale of 0.5 u per 100 >150   TDD not reviewed     I am very happy with numbers provided for today's visit, which are nearly all in goal range.    Date Before Breakfast Before Lunch Before Dinner   4/1 103 107 112   4/2 83 116 101   4/3 86 87 87   4/4 87 138 103   4/5 112 93 109   4/6 107 99 108   4/7 103 117 139   4/8 103 126 141   4/9 97 124 106   4/10 93 109 102   4/11 84 140 127   4/12 71 97 110   4/13 90 104 108   4/14 95 114 146   4/15 81 81 103   4/16 107       AVERAGE is 105 mg/dL.  3 checks per day.     Recent A1c:         Lab Results   Component Value Date     A1C 7.6 09/05/2019     A1C 8.5 05/16/2019     A1C 6.9 01/17/2019     A1C 7.5 06/07/2018     A1C 6.3 12/07/2017         Hypoglycemia history:  None recent.  The patient has had NO episodes of severe hypoglycemia (seizure, coma, or neuroglycopenic symptoms severe enough to require assistance from another person).  Blood sugars were reviewed from the patient records and/or the meter download.    Average BG:  as above,  needs strips for 4 checks per day so that she can increase checks in case of hypoglycemia or illness.       2)   GI symptoms/pain:  was doing much better last visit, but now is off her magnesium supplement and is noticing more problems with GI system, thinks is having some recurrence of rectal prolapse and notices small amounts bright red blood by stool, no black stool.   Prior Supplements  Dopatone Active  ImmunoG PRP  GI Synergy  Magnezyme (400 mg mag oxide)    No narcotics  NOT on suboxone.  Only pain meds are tylenol and ibuprofen.     3)  Psychosocial:  Father in law recently passed away of known progressive neurologic disease.  She had major depression, and now fosters dogs which she says has helped enormously with mental health.      Pneumococcal vaccine booster in July 2019.        Review of systems:  Review Of Systems  Skin: negative  Eyes: negative  Ears/Nose/Throat: negative  Respiratory: No shortness of breath, dyspnea on exertion, cough, or hemoptysis  Cardiovascular: negative  Gastrointestinal: as above-   Genitourinary: negative  Musculoskeletal: negative  Neurologic: negative  Psychiatric: anxiety/depression- improved  Hematologic/Lymphatic/Immunologic: negative  Endocrine: as above     Past Medical and Surgical History:  Past Medical History        Past Medical History:   Diagnosis Date     Abdominal pain, epigastric 11/05, ongoing; goes to pain clinic     probable recurrent spasm sphincter of Oddi causing biliary colic pains      Benign paroxysmal positional vertigo       occ.      Calculus of kidney 5/05     x1 on L side passed, several stones.  Has been tested for oxalate.     Chronic abdominal pain 7/17/2013     Chronic pancreatitis (H) 7/17/2013     Depressive disorder, not elsewhere classified       also occ panic spells     Diabetes (H)       post pancreatectomy     Dyspepsia and other specified disorders of function of stomach 6/99     H. pylori   treated     Headache(784.0)       still periodic HA's ;  often 5X/week     Hypertension 2/22/16     Stress related     Iron deficiency anemia secondary to  inadequate dietary iron intake      relates to gastric bypass     Other chronic pain       Post-pancreatectomy diabetes (H) 2013     Pyelonephritis, unspecified , 10/8     L side     Regional enteritis of unspecified site      inacive/remission     Sleep apnea       uses Cpap     Spasm of sphincter of Oddi      [place]CP with St[place] of [place]andez [place] [facility] [place] sx relief     Spasm of sphincter of Oddi       surgical + endoscopic stenting of pancreatic duct @ [facility] [place] 06     Thyroid nodule 2016     Ureteral calculus 10/2/2012     Vaccination not carried out          Past Surgical History         Past Surgical History:   Procedure Laterality Date     APPENDECTOMY        C NONSPECIFIC PROCEDURE        R bunion     C NONSPECIFIC PROCEDURE        T & A     C NONSPECIFIC PROCEDURE        partial ileum resection     C NONSPECIFIC PROCEDURE        R ovarian cyst     C NONSPECIFIC PROCEDURE             C NONSPECIFIC PROCEDURE        GALL BLADDER     C NONSPECIFIC PROCEDURE        CBD stent; Dr. Presley     C NONSPECIFIC PROCEDURE    &      colonoscopy     C NONSPECIFIC PROCEDURE        Gastric bypass     CHOLECYSTECTOMY         COLONOSCOPY         COMBINED CYSTOSCOPY, RETROGRADES, URETEROSCOPY, LASER HOLMIUM LITHOTRIPSY URETER(S), INSERT STENT Right 3/23/2015     Procedure: COMBINED CYSTOSCOPY, RETROGRADES, URETEROSCOPY, LASER HOLMIUM LITHOTRIPSY URETER(S), INSERT STENT;  Surgeon: Kennedi Aldana MD;  Location: [facility]     COMBINED CYSTOSCOPY, RETROGRADES, URETEROSCOPY, LASER HOLMIUM LITHOTRIPSY URETER(S), INSERT STENT Right 2015     Procedure: COMBINED CYSTOSCOPY, RETROGRADES, URETEROSCOPY, LASER HOLMIUM LITHOTRIPSY URETER(S), INSERT STENT;  Surgeon: Kennedi Aldana MD;  Location: [facility] OR     COSMETIC SURGERY   2002     Tummy tuck     CYSTOSCOPY, RETROGRADES, INSERT STENT URETER(S), COMBINED   10/2/2012     Procedure:  COMBINED CYSTOSCOPY, RETROGRADES, INSERT STENT URETER(S);  COMBINED CYSTOSCOPY,  , INSERT LEFT STENT URETER;  Surgeon: Johny Baez MD;  Location: [facility] OR     ENT SURGERY         ESOPHAGOSCOPY, GASTROSCOPY, DUODENOSCOPY (EGD), COMBINED N/A 2018     Procedure: COMBINED ESOPHAGOSCOPY, GASTROSCOPY, DUODENOSCOPY (EGD);  ESOPHAGOSCOPY, GASTROSCOPY, DUODENOSCOPY (EGD)    ;  Surgeon: Tamir Rodgers MD;  Location:  GI     EXTRACORPOREAL SHOCK WAVE LITHOTRIPSY (ESWL)   10/16/2012     Procedure: EXTRACORPOREAL SHOCK WAVE LITHOTRIPSY (ESWL);  left EXTRACORPOREAL SHOCK WAVE LITHOTRIPSY (ESWL) ;  Surgeon: Johny Baez MD;  Location: [facility] OR     GI SURGERY   2015     Small bowel obstruction     HC LAP,LYSIS OF ADHESIONS         HERNIA REPAIR   2015     LAPAROSCOPIC LYSIS ADHESIONS N/A 2015     Procedure: LAPAROSCOPIC LYSIS ADHESIONS;  Surgeon: Aaron Early MD;  [facility]     LAPAROSCOPIC LYSIS ADHESIONS N/A 2015     Procedure: LAPAROSCOPIC LYSIS ADHESIONS;  Surgeon: Aaron Early MD;  [facility]     PANCREATECTOMY, TRANSPLANT AUTO ISLET CELL, COMBINED   5/10/2013     Procedure: COMBINED PANCREATECTOMY, TRANSPLANT AUTO ISLET CELL;  Pancreatectomy, Auto Islet Cell Transplant   hernia repair, jejunostomy tube and liver biopsies with Anesthesia General with block;  Surgeon: Aaron Early MD;  Location: [facility] OR     TRANSPLANT   5/10/13           Family History:  New changes since last visit:  none  Family History         Family History   Problem Relation Age of Onset     Family History Negative Mother       Respiratory Father           COPD;  at 69     Genitourinary Problems Father           kidney stones     Substance Abuse Father       Depression Father       Asthma Father       Heart Disease Paternal Grandfather           M.I.     Coronary Artery Disease Paternal Grandfather       Hyperlipidemia Paternal Grandfather       Genitourinary Problems Brother           multiple  brothers with kidney stones     Gastrointestinal Disease Maternal Grandmother           undiagnosed 'gut' issues     Coronary Artery Disease Maternal Grandfather       Hyperlipidemia Maternal Grandfather       Cerebrovascular Disease Paternal Grandmother           At the age of 103     Anxiety Disorder Paternal Grandmother       Osteoporosis Paternal Grandmother       Anxiety Disorder Son       Anxiety Disorder Daughter       Asthma Daughter             Social History:  Social History          Social History Narrative     Not on file     Does not work currently.  Social stressors      Physical Exam:  Vitals: none due to virual visit   General:  Well-appearing, NAD  Psych:  Communicative, with normal affect            Assessment:  1.  Post pancreatectomy diabetes mellitus, s/p total pancreatectomy and islet autotransplant.  (ICD-10 E89.1)  2.  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  She was insulin independent until 6/6/2017 (just after 4 years post-tx) and now has partial islet function.     We reviewed numbers for BG control today which appear adequate.  She will need A1c when she comes back to clinic this summer.  Will plan for August follow up.  Plan and patient instructions are detailed below.         Plan:  Patient Instructions     I am very happy with the current diabetes regimen and BG numbers.  There are very few highs or lows.     1)  Continue Lantus 6 units     2) Continue the same dose of Novolog to 0.5 unit per 10 grams                     What your blood glucose is before eating:   How much you plan to eat: 80- 150 mg/dL 151- 250 mg/dL 251 to 350 mg/dL   Less than 10 grams carb 0 0.5 1   10-19 grams carb 0.5 1 1.5   20- 29 grams carb 1 1.5 2   30- 39 grams carb 1.5 2 2.5   40- 49 grams carb 2 2.5 3   50 or  more grams carb  2.5 3 3.5        3)  I would get a different magnesium supplement (400 mg magnesium oxide or mag citrate) from the drug store and continue taking this, as it seemed to really help you previously.    4)  We talked about small amount of blood on stool that you think is rectal prolapse from being off usual GI supplements.  We talked about when  You would need to go in-- dark black stool or larger amounts of blood concerning for acute blood loss.      Follow up:  Thursday Aug 20 at 1:40.              Video-Visit Details    Type of service:  Video Visit    Video End Time (time video stopped): 1:22pm    Originating Location (pt. Location): Home    Distant Location (provider location):  [facility]     Mode of Communication:  Video Conference via [employer] & supplemented with facetime due to tech difficulties      Adriana Robles MD  , Pediatric Endocrinology and Diabetes & Dept of Surgery  [facility] [place] [facility]

## 2020-04-21 ENCOUNTER — TRANSFERRED RECORDS (OUTPATIENT)
Dept: HEALTH INFORMATION MANAGEMENT | Facility: CLINIC | Age: 55
End: 2020-04-21

## 2020-05-18 ENCOUNTER — TELEPHONE (OUTPATIENT)
Dept: FAMILY MEDICINE | Facility: CLINIC | Age: 55
End: 2020-05-18

## 2020-05-18 DIAGNOSIS — K21.00 GASTROESOPHAGEAL REFLUX DISEASE WITH ESOPHAGITIS: Primary | ICD-10-CM

## 2020-05-20 RX ORDER — FAMOTIDINE 40 MG/1
40 TABLET, FILM COATED ORAL 2 TIMES DAILY PRN
Qty: 180 TABLET | Refills: 3 | Status: SHIPPED | OUTPATIENT
Start: 2020-05-20 | End: 2021-05-03

## 2020-05-27 DIAGNOSIS — Z11.59 ENCOUNTER FOR SCREENING FOR OTHER VIRAL DISEASES: Primary | ICD-10-CM

## 2020-06-05 ENCOUNTER — OFFICE VISIT (OUTPATIENT)
Dept: FAMILY MEDICINE | Facility: CLINIC | Age: 55
End: 2020-06-05
Payer: COMMERCIAL

## 2020-06-05 VITALS
HEIGHT: 64 IN | RESPIRATION RATE: 17 BRPM | OXYGEN SATURATION: 98 % | BODY MASS INDEX: 24.24 KG/M2 | TEMPERATURE: 99.8 F | DIASTOLIC BLOOD PRESSURE: 70 MMHG | SYSTOLIC BLOOD PRESSURE: 122 MMHG | WEIGHT: 142 LBS | HEART RATE: 96 BPM

## 2020-06-05 DIAGNOSIS — E89.1 POST-PANCREATECTOMY DIABETES (H): ICD-10-CM

## 2020-06-05 DIAGNOSIS — K86.81 EXOCRINE PANCREATIC INSUFFICIENCY: ICD-10-CM

## 2020-06-05 DIAGNOSIS — Z98.84 GASTRIC BYPASS STATUS FOR OBESITY: ICD-10-CM

## 2020-06-05 DIAGNOSIS — R79.89 ELEVATED LFTS: ICD-10-CM

## 2020-06-05 DIAGNOSIS — E03.9 ACQUIRED HYPOTHYROIDISM: ICD-10-CM

## 2020-06-05 DIAGNOSIS — E13.9 POST-PANCREATECTOMY DIABETES (H): ICD-10-CM

## 2020-06-05 DIAGNOSIS — G47.33 OSA (OBSTRUCTIVE SLEEP APNEA): ICD-10-CM

## 2020-06-05 DIAGNOSIS — Z90.410 POST-PANCREATECTOMY DIABETES (H): ICD-10-CM

## 2020-06-05 DIAGNOSIS — D50.8 IRON DEFICIENCY ANEMIA SECONDARY TO INADEQUATE DIETARY IRON INTAKE: ICD-10-CM

## 2020-06-05 DIAGNOSIS — H02.403 PTOSIS OF BOTH EYELIDS: ICD-10-CM

## 2020-06-05 DIAGNOSIS — Z01.818 PREOP GENERAL PHYSICAL EXAM: Primary | ICD-10-CM

## 2020-06-05 LAB
ERYTHROCYTE [DISTWIDTH] IN BLOOD BY AUTOMATED COUNT: 14.2 % (ref 10–15)
HBA1C MFR BLD: 7.1 % (ref 0–5.6)
HCT VFR BLD AUTO: 39 % (ref 35–47)
HGB BLD-MCNC: 12.5 G/DL (ref 11.7–15.7)
MCH RBC QN AUTO: 29.7 PG (ref 26.5–33)
MCHC RBC AUTO-ENTMCNC: 32.1 G/DL (ref 31.5–36.5)
MCV RBC AUTO: 93 FL (ref 78–100)
PLATELET # BLD AUTO: 354 10E9/L (ref 150–450)
RBC # BLD AUTO: 4.21 10E12/L (ref 3.8–5.2)
WBC # BLD AUTO: 8.3 10E9/L (ref 4–11)

## 2020-06-05 PROCEDURE — 93000 ELECTROCARDIOGRAM COMPLETE: CPT | Performed by: FAMILY MEDICINE

## 2020-06-05 PROCEDURE — 36415 COLL VENOUS BLD VENIPUNCTURE: CPT | Performed by: FAMILY MEDICINE

## 2020-06-05 PROCEDURE — 83036 HEMOGLOBIN GLYCOSYLATED A1C: CPT | Performed by: FAMILY MEDICINE

## 2020-06-05 PROCEDURE — 84439 ASSAY OF FREE THYROXINE: CPT | Performed by: FAMILY MEDICINE

## 2020-06-05 PROCEDURE — 80053 COMPREHEN METABOLIC PANEL: CPT | Performed by: FAMILY MEDICINE

## 2020-06-05 PROCEDURE — 99215 OFFICE O/P EST HI 40 MIN: CPT | Performed by: FAMILY MEDICINE

## 2020-06-05 PROCEDURE — 84443 ASSAY THYROID STIM HORMONE: CPT | Performed by: FAMILY MEDICINE

## 2020-06-05 PROCEDURE — 85027 COMPLETE CBC AUTOMATED: CPT | Performed by: FAMILY MEDICINE

## 2020-06-05 ASSESSMENT — MIFFLIN-ST. JEOR: SCORE: 1206.17

## 2020-06-05 NOTE — PROGRESS NOTES
[facility]  [address] [place] [id number]  [phone]  Dept: [phone]    PRE-OP EVALUATION:  Today's date: 2020    Lynn Thompson (: 1965) presents for pre-operative evaluation assessment as requested by Dr. Alberts .  She requires evaluation and anesthesia risk assessment prior to undergoing surgery/procedure for treatment of Bilateral Brow repair  .    Primary Physician: Maryana Brooks  Type of Anesthesia Anticipated: General    Patient has a Health Care Directive or Living Will:  NO    Preop Questions 2020   Who is doing your surgery? Dr Denise Alberts   What are you having done? Bilateral Brow Ptosis Repair   Date of Surgery/Procedure: 2020   Facility or Hospital where procedure/surgery will be performed: [facility]   1.  Do you have a history of Heart attack, stroke, stent, coronary bypass surgery, or other heart surgery? No   2.  Do you ever have any pain or discomfort in your chest? No   3.  Do you have a history of  Heart Failure? No   4.   Are you troubled by shortness of breath when:  walking on a level surface, or up a slight hill, or at night? No   5.  Do you currently have a cold, bronchitis or other respiratory infection? No   6.  Do you have a cough, shortness of breath, or wheezing? No   7.  Do you sometimes get pains in the calves of your legs when you walk? No   8. Do you or anyone in your family have previous history of blood clots? YES -    9.  Do you or does anyone in your family have a serious bleeding problem such as prolonged bleeding following surgeries or cuts? No   10. Have you ever had problems with anemia or been told to take iron pills? YES -    11. Have you had any abnormal blood loss such as black, tarry or bloody stools, or abnormal vaginal bleeding? YES -    12. Have you ever had a blood transfusion? UNKNOWN -    13. Have you or any of your relatives ever had problems with anesthesia? No   14. Do you have  sleep apnea, excessive snoring or daytime drowsiness? YES   15. Do you have any prosthetic heart valves? No   16. Do you have prosthetic joints? No   17. Is there any chance that you may be pregnant? No         HPI:     HPI related to upcoming procedure: Patient undergoing bilateral eyebrow repair.    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    Patient with a history of obstructive sleep apnea using a night splint.    History of iron deficiency anemia over several years duration and undergoes IV iron infusions.    Patient with history of exocrine pancreatic insufficiency for which she had pancreatectomy 2013.    Patient with history of gastric bypass in 2003.    Patient with history of hypothyroidism.    MEDICAL HISTORY:     Patient Active Problem List    Diagnosis Date Noted     Health Care Home 05/03/2011     Priority: High     EMERGENCY CARE PLAN  Presenting Problem Signs and Symptoms Treatment Plan    Questions or conerns during clinic hours    I will call the clinic directly     Questions or conerns outside clinic hours    I will call the 24 hour nurse line at [phone]    Patient needs to schedule an appointment    I will call the 24 hour scheduling team at [phone] or clinic directly    Same day treatment     I will call the clinic first, nurse line if after hours, urgent care and express care if needed                            DX V65.8 REPLACED WITH [id number] [facility] HOME (04/08/2013)       Antibiotic-associated diarrhea 08/28/2017     Priority: Medium     Elevated LFTs 08/28/2017     Priority: Medium     E coli bacteremia 08/25/2017     Priority: Medium     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     Priority: Medium     Acquired hypothyroidism 11/03/2016     Priority: Medium     Thyroid nodule 11/01/2016     Priority: Medium     Dysthymia 03/01/2016     Priority: Medium     Anxiety 03/01/2016     Priority: Medium      S/P exploratory laparotomy 12/29/2015     Priority: Medium     BLU (obstructive sleep apnea) 12/23/2015     Priority: Medium     Asplenia 10/24/2014     Priority: Medium     Exocrine pancreatic insufficiency 05/17/2013     Priority: Medium     Chronic pain syndrome 02/23/2013     Priority: Medium     Gastric bypass status for obesity 10/26/2010     Priority: Medium     Iron deficiency anemia secondary to inadequate dietary iron intake 12/28/2004     Priority: Medium     relates to gastric bypass        Past Medical History:   Diagnosis Date     Abdominal pain, epigastric 11/05, ongoing; goes to pain clinic    probable recurrent spasm sphincter of Oddi causing biliary colic pains      Benign paroxysmal positional vertigo     occ.      Calculus of kidney 5/05    x1 on L side passed, several stones.  Has been tested for oxalate.     Chronic abdominal pain 7/17/2013     Chronic pancreatitis (H) 7/17/2013     Depressive disorder, not elsewhere classified     also occ panic spells     Diabetes (H)     post pancreatectomy     Dyspepsia and other specified disorders of function of stomach 6/99    H. 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    ERCP with Stent of CBD by Fernandez @ [facility] [place], [place] with sx relief     Spasm of sphincter of Oddi     surgical + endoscopic stenting of pancreatic duct @ [facility] [place] [place] 5/23/06     Thyroid nodule 11/1/2016     Ureteral calculus 10/2/2012     Vaccination not carried out      Past Surgical History:   Procedure Laterality Date     APPENDECTOMY  1990     C NONSPECIFIC  PROCEDURE      R bunion     C NONSPECIFIC PROCEDURE      T & A     C NONSPECIFIC PROCEDURE      partial ileum resection     C NONSPECIFIC PROCEDURE      R ovarian cyst     C NONSPECIFIC PROCEDURE           C NONSPECIFIC PROCEDURE      GALL BLADDER     C NONSPECIFIC PROCEDURE      CBD stent; Dr. Presley     C NONSPECIFIC PROCEDURE   &     colonoscopy     C NONSPECIFIC PROCEDURE      Gastric bypass     CHOLECYSTECTOMY       COLONOSCOPY       COMBINED CYSTOSCOPY, RETROGRADES, URETEROSCOPY, LASER HOLMIUM LITHOTRIPSY URETER(S), INSERT STENT Right 3/23/2015    Procedure: COMBINED CYSTOSCOPY, RETROGRADES, URETEROSCOPY, LASER HOLMIUM LITHOTRIPSY URETER(S), INSERT STENT;  Surgeon: Kennedi Aldana MD;  Location: [facility] OR     COMBINED CYSTOSCOPY, RETROGRADES, URETEROSCOPY, LASER HOLMIUM LITHOTRIPSY URETER(S), INSERT STENT Right 2015    Procedure: COMBINED CYSTOSCOPY, RETROGRADES, URETEROSCOPY, LASER HOLMIUM LITHOTRIPSY URETER(S), INSERT STENT;  Surgeon: Kennedi Aldana MD;  Location: [facility] OR     COSMETIC SURGERY  2002    Tummy tuck     CYSTOSCOPY, RETROGRADES, INSERT STENT URETER(S), COMBINED  10/2/2012    Procedure: COMBINED CYSTOSCOPY, RETROGRADES, INSERT STENT URETER(S);  COMBINED CYSTOSCOPY,  , INSERT LEFT STENT URETER;  Surgeon: Johny Baez MD;  Location: [facility] OR     ENT SURGERY       ESOPHAGOSCOPY, GASTROSCOPY, DUODENOSCOPY (EGD), COMBINED N/A 2018    Procedure: COMBINED ESOPHAGOSCOPY, GASTROSCOPY, DUODENOSCOPY (EGD);  ESOPHAGOSCOPY, GASTROSCOPY, DUODENOSCOPY (EGD)    ;  Surgeon: Tamir Rodgers MD;  Location:  GI     EXTRACORPOREAL SHOCK WAVE LITHOTRIPSY (ESWL)  10/16/2012    Procedure: EXTRACORPOREAL SHOCK WAVE LITHOTRIPSY (ESWL);  left EXTRACORPOREAL SHOCK WAVE LITHOTRIPSY (ESWL) ;  Surgeon: Johny Baez MD;  Location: [facility] OR     GI SURGERY  2015    Small bowel obstruction     HC LAP,LYSIS OF ADHESIONS       HERNIA  REPAIR  2/2015     LAPAROSCOPIC LYSIS ADHESIONS N/A 2/20/2015    Procedure: LAPAROSCOPIC LYSIS ADHESIONS;  Surgeon: Aaron Early MD;  [facility]: [facility] OR     LAPAROSCOPIC LYSIS ADHESIONS N/A 12/29/2015    Procedure: LAPAROSCOPIC LYSIS ADHESIONS;  Surgeon: Aaron Early MD;  [facility]: [facility] OR     PANCREATECTOMY, TRANSPLANT AUTO ISLET CELL, COMBINED  5/10/2013    Procedure: COMBINED PANCREATECTOMY, TRANSPLANT AUTO ISLET CELL;  Pancreatectomy, Auto Islet Cell Transplant   hernia repair, jejunostomy tube and liver biopsies with Anesthesia General with block;  Surgeon: Aaron Early MD;  Location: [facility] OR     TRANSPLANT  5/10/13     Current Outpatient Medications   Medication Sig Dispense Refill     ACETAMINOPHEN PO Take 325 mg by mouth every 6 hours as needed for pain       amylase-lipase-protease (VIOKACE) 03216 units TABS tablet Take 4-5 caps with meals and 1-2 with snacks 600 tablet 5     blood glucose (BILL MICROLET 2) lancing device Use to test blood sugars 6 times daily or as directed. 1 each 11     blood glucose monitoring (BILL CONTOUR NEXT) test strip Check BS 4-6 times a day 2 Box 6     DULoxetine (CYMBALTA) 30 MG capsule Take 1 capsule (30 mg) by mouth 2 times daily Fill at patient request. 180 capsule 3     escitalopram (LEXAPRO) 10 MG tablet 20 mg   2     estradiol (ESTRACE) 0.1 MG/GM vaginal cream PLACE 2 GRAMS VAGINALLY TWICE A WEEK 42.5 g 11     Estradiol-Norethindrone Acet 0.5-0.1 MG TABS Take 1 tablet by mouth daily 84 tablet 0     famotidine (PEPCID) 40 MG tablet Take 1 tablet (40 mg) by mouth 2 times daily as needed for heartburn 180 tablet 3     gabapentin (NEURONTIN) 300 MG capsule Take 1 capsule by mouth At Bedtime        glucose 40 % GEL Take 15-30 g by mouth every 15 minutes as needed. 1 Tube 11     hydrOXYzine (ATARAX) 25 MG tablet TAKE 1-2 TABLETS BY MOUTH 2 TIMES DAILY AS NEEDED FOR ANXIETY  0     Injection Device for insulin (NOVOPEN ECHO) MODESTA Use to inject insulin subcutaneously four  times daily 1 each 1     Injection Device for insulin (NOVOPEN JR, HUNTER,) MODESTA 1 Device Inject 1 unit (which is marked as a 1/2 unit on the pen itself)  as needed when eating carb heavy meals.       insulin aspart (NOVOLOG PENFILL) 100 UNITS/ML vial Inject 0.5-2 units 4 times daily 3 mL 3     insulin glargine (LANTUS SOLOSTAR) 100 UNIT/ML pen Inject 5 Units Subcutaneous every evening 15 mL 3     insulin pen needle (BD MAHESH U/F) 32G X 4 MM Use up to 3 times daily as needed for insulin dosing 100 each prn     levonorgestrel-ethinyl estradiol (SEASONALE) 0.15-0.03 MG tablet Take 1 tablet by mouth       levothyroxine (SYNTHROID/LEVOTHROID) 88 MCG tablet Take 1 tablet (88 mcg) by mouth daily 90 tablet 3     loratadine (CLARITIN) 10 MG tablet Take 10 mg by mouth daily as needed        modafinil (PROVIGIL) 200 MG tablet Take 2 tablets by mouth Once a Day       omeprazole (PRILOSEC) 40 MG DR capsule Take 1 capsule (40 mg) by mouth 2 times daily Take 30-60 minutes before a meal. 180 capsule 3     ondansetron (ZOFRAN ODT) 4 MG ODT tab Take 1-2 tablets (4-8 mg) by mouth 3 times daily (before meals) 20 tablet 3     ranitidine (ZANTAC) 150 MG tablet Take 1 tablet (150 mg) by mouth 2 times daily 180 tablet 3     Sharps Container MISC For insulin needles 1 each prn     traZODone (DESYREL) 50 MG tablet Take 1 tablet (50 mg) by mouth At Bedtime 90 tablet 3     UNABLE TO FIND MEDICATION NAME: Dopatone Active       UNABLE TO FIND MEDICATION NAME: magnezyme       UNABLE TO FIND MEDICATION NAME: immunoG PRP       UNABLE TO FIND MEDICATION NAME: GI-Synergy       OTC products: None, except as noted above    Allergies   Allergen Reactions     Corticosteroids Other (See Comments)     All oral, IV and injectable steroids are contraindicated (unless in life threatening situations) in Islet Auto transplant recipients. They can cause irreversible loss of islet cell function. Please contact patient's transplant care coordinator, Erlinda  "Rangel COVARRUBIAS BSN at [phone]/pager: [phone] and endocrinologist prior to administration.       Povidone Iodine Hives     Causes skin to blister     Naproxen      Other reaction(s): Abdominal Pain  Pt allergic to Naprosyn     Nsaids      naprosyn = GI upset     Povidone Iodine      blisters     Sulfasalazine Nausea and Nausea and Vomiting      Latex Allergy: NO    Social History     Tobacco Use     Smoking status: Never Smoker     Smokeless tobacco: Never Used   Substance Use Topics     Alcohol use: No     Alcohol/week: 0.0 standard drinks     Frequency: Never     Drinks per session: Patient refused     Binge frequency: Never     History   Drug Use No       REVIEW OF SYSTEMS:   CONSTITUTIONAL: NEGATIVE for fever, chills, change in weight  INTEGUMENTARY/SKIN: NEGATIVE for worrisome rashes, moles or lesions  EYES: NEGATIVE for vision changes or irritation  ENT/MOUTH: NEGATIVE for ear, mouth and throat problems  RESP: NEGATIVE for significant cough or SOB  CV: NEGATIVE for chest pain, palpitations or peripheral edema  GI: Stable GI status  : NEGATIVE for frequency, dysuria, or hematuria  MUSCULOSKELETAL: NEGATIVE for significant arthralgias or myalgia  NEURO: NEGATIVE for weakness, dizziness or paresthesias  ENDOCRINE: NEGATIVE for temperature intolerance, skin/hair changes  HEME: NEGATIVE for bleeding problems  PSYCHIATRIC: NEGATIVE for changes in mood or affect    EXAM:   /70 (BP Location: Right arm, Patient Position: Chair, Cuff Size: Adult Regular)   Pulse 96   Temp 99.8  F (37.7  C) (Oral)   Resp 17   Ht 1.613 m (5' 3.5\")   Wt 64.4 kg (142 lb)   LMP 12/19/2013   SpO2 98%   BMI 24.76 kg/m      GENERAL APPEARANCE: healthy, alert and no distress     EYES: EOMI, PERRL     HENT: ear canals and TM's normal, oral mucous membranes moist and oropharynx clear     NECK: no adenopathy     RESP: lungs clear to auscultation - no rales, rhonchi or wheezes     CV: regular rates and rhythm, normal S1 S2, " no S3 or S4 and no murmur, click or rub     ABDOMEN: soft, mildly tender left lower quadrant which is stable at her baseline without rebound tenderness, without hepatosplenomegaly or masses     MS: extremities normal- no gross deformities noted, no evidence of inflammation in joints, FROM in all extremities.     SKIN: no suspicious lesions or rashes     NEURO: Normal strength and tone, sensory exam grossly normal, mentation intact and speech normal     PSYCH: mentation appears normal. and affect normal/bright     LYMPHATICS: No cervical adenopathy    DIAGNOSTICS:   EKG: Normal Sinus Rhythm, normal axis, normal intervals, no acute ST/T changes c/w ischemia, no LVH by voltage criteria, unchanged from previous tracings    Recent Labs   Lab Test 02/06/20  1554 10/31/19  0956 09/05/19  05/10/16  2019  05/10/13  1950   HGB 12.0 12.3  --    < > 12.0   < > 8.1*    358  --    < > 383   < > 181   INR  --   --   --   --  1.03  --  1.29*    138  --    < > 136   < > 140   POTASSIUM 3.6 4.5  --    < > 4.5   < > 4.4   CR 0.81 0.85  --    < > 0.72   < > 0.76   A1C 6.9*  --  7.6*   < >  --    < >  --     < > = values in this interval not displayed.        IMPRESSION:   Reason for surgery/procedure: Blepharoptosis  Diagnosis/reason for consult: preoperative evaluation for assessment of cardiovascular and respiratory disease as well as overall risk assessment and perioperative medical management.    The proposed surgical procedure is considered INTERMEDIATE risk.    REVISED CARDIAC RISK INDEX  The patient has the following serious cardiovascular risks for perioperative complications such as (MI, PE, VFib and 3  AV Block):  Diabetes Mellitus (on Insulin)  INTERPRETATION: 1 risks: Class II (low risk - 0.9% complication rate)    The patient has the following additional risks for perioperative complications:  No identified additional risks      ICD-10-CM    1. Preop general physical exam  Z01.818 EKG 12-lead complete w/read -  Clinics     CBC with platelets     Comprehensive metabolic panel (BMP + Alb, Alk Phos, ALT, AST, Total. Bili, TP)   2. Post-pancreatectomy diabetes (H)  E89.1 Hemoglobin A1c    E13.9     Z90.410    3. BLU (obstructive sleep apnea)  G47.33    4. Iron deficiency anemia secondary to inadequate dietary iron intake  D50.8 CBC with platelets   5. Gastric bypass status for obesity  Z98.84    6. Exocrine pancreatic insufficiency  K86.81    7. Elevated LFTs  R94.5    8. Acquired hypothyroidism  E03.9 TSH with free T4 reflex       RECOMMENDATIONS:       Cardiovascular Risk  Performs 4 METs exercise without symptoms (Climb a flight of stairs and Walk on level ground at 15 minutes per mile (4 miles/hour)) .       Obstructive Sleep Apnea (or suspected sleep apnea)  Hospital staff are advised to monitor for sleep related oxygen desaturations due to BLU      Anemia  Anemia and does not require treatment prior to surgery.  Monitor Hemoglobin postoperatively.      --Patient is to take all scheduled medications on the day of surgery EXCEPT for modifications listed below.    Diabetes Medication Use  -----Take 66% of long acting insulin (e.g. Lantus, NPH) while NPO (fasting)      APPROVAL GIVEN to proceed with proposed procedure, without further diagnostic evaluation       Signed Electronically by: Ravinder Kendall MD    Copy of this evaluation report is provided to requesting physician.    [facility] Preop Guidelines    Revised Cardiac Risk Index

## 2020-06-05 NOTE — PATIENT INSTRUCTIONS
Before Your Surgery      Call your surgeon if there is any change in your health. This includes signs of a cold or flu (such as a sore throat, runny nose, cough, rash or fever).    Do not smoke, drink alcohol or take over the counter medicine (unless your surgeon or primary care doctor tells you to) for the 24 hours before and after surgery.    If you take prescribed drugs: Follow your doctor s orders about which medicines to take and which to stop until after surgery.    Eating and drinking prior to surgery: follow the instructions from your surgeon    Take a shower or bath the night before surgery. Use the soap your surgeon gave you to gently clean your skin. If you do not have soap from your surgeon, use your regular soap. Do not shave or scrub the surgery site.  Wear clean pajamas and have clean sheets on your bed.     Take 4 units of lantus night prior to surgery

## 2020-06-06 DIAGNOSIS — E03.9 ACQUIRED HYPOTHYROIDISM: ICD-10-CM

## 2020-06-06 DIAGNOSIS — Z11.59 ENCOUNTER FOR SCREENING FOR OTHER VIRAL DISEASES: ICD-10-CM

## 2020-06-06 DIAGNOSIS — F41.9 ANXIETY: ICD-10-CM

## 2020-06-06 LAB
ALBUMIN SERPL-MCNC: 3.4 G/DL (ref 3.4–5)
ALP SERPL-CCNC: 108 U/L (ref 40–150)
ALT SERPL W P-5'-P-CCNC: 32 U/L (ref 0–50)
ANION GAP SERPL CALCULATED.3IONS-SCNC: 4 MMOL/L (ref 3–14)
AST SERPL W P-5'-P-CCNC: 25 U/L (ref 0–45)
BILIRUB SERPL-MCNC: 0.3 MG/DL (ref 0.2–1.3)
BUN SERPL-MCNC: 14 MG/DL (ref 7–30)
CALCIUM SERPL-MCNC: 8.9 MG/DL (ref 8.5–10.1)
CHLORIDE SERPL-SCNC: 107 MMOL/L (ref 94–109)
CO2 SERPL-SCNC: 27 MMOL/L (ref 20–32)
CREAT SERPL-MCNC: 0.89 MG/DL (ref 0.52–1.04)
GFR SERPL CREATININE-BSD FRML MDRD: 72 ML/MIN/{1.73_M2}
GLUCOSE SERPL-MCNC: 126 MG/DL (ref 70–99)
POTASSIUM SERPL-SCNC: 3.6 MMOL/L (ref 3.4–5.3)
PROT SERPL-MCNC: 7.2 G/DL (ref 6.8–8.8)
SODIUM SERPL-SCNC: 138 MMOL/L (ref 133–144)
T4 FREE SERPL-MCNC: 1.05 NG/DL (ref 0.76–1.46)
TSH SERPL DL<=0.005 MIU/L-ACNC: 4.25 MU/L (ref 0.4–4)

## 2020-06-06 PROCEDURE — 99000 SPECIMEN HANDLING OFFICE-LAB: CPT | Performed by: OPHTHALMOLOGY

## 2020-06-06 PROCEDURE — 99207 ZZC NO BILLABLE SERVICE THIS VISIT: CPT

## 2020-06-06 PROCEDURE — U0003 INFECTIOUS AGENT DETECTION BY NUCLEIC ACID (DNA OR RNA); SEVERE ACUTE RESPIRATORY SYNDROME CORONAVIRUS 2 (SARS-COV-2) (CORONAVIRUS DISEASE [COVID-19]), AMPLIFIED PROBE TECHNIQUE, MAKING USE OF HIGH THROUGHPUT TECHNOLOGIES AS DESCRIBED BY CMS-2020-01-R: HCPCS | Mod: 90 | Performed by: OPHTHALMOLOGY

## 2020-06-06 RX ORDER — LEVOTHYROXINE SODIUM 88 UG/1
88 TABLET ORAL DAILY
Qty: 90 TABLET | Refills: 3 | Status: CANCELLED | OUTPATIENT
Start: 2020-06-06

## 2020-06-07 LAB
SARS-COV-2 RNA SPEC QL NAA+PROBE: NOT DETECTED
SPECIMEN SOURCE: NORMAL

## 2020-06-08 ENCOUNTER — ANESTHESIA EVENT (OUTPATIENT)
Dept: SURGERY | Facility: CLINIC | Age: 55
End: 2020-06-08
Payer: COMMERCIAL

## 2020-06-08 ENCOUNTER — MYC MEDICAL ADVICE (OUTPATIENT)
Dept: NURSING | Facility: CLINIC | Age: 55
End: 2020-06-08

## 2020-06-08 DIAGNOSIS — E03.9 ACQUIRED HYPOTHYROIDISM: Primary | ICD-10-CM

## 2020-06-08 RX ORDER — LEVOTHYROXINE SODIUM 100 UG/1
100 TABLET ORAL DAILY
Qty: 90 TABLET | Refills: 0 | Status: SHIPPED | OUTPATIENT
Start: 2020-06-08 | End: 2020-09-14

## 2020-06-08 RX ORDER — DULOXETIN HYDROCHLORIDE 30 MG/1
30 CAPSULE, DELAYED RELEASE ORAL 2 TIMES DAILY
Qty: 180 CAPSULE | Refills: 3 | Status: ON HOLD | OUTPATIENT
Start: 2020-06-08 | End: 2020-10-29

## 2020-06-08 ASSESSMENT — ANXIETY QUESTIONNAIRES
3. WORRYING TOO MUCH ABOUT DIFFERENT THINGS: NEARLY EVERY DAY
6. BECOMING EASILY ANNOYED OR IRRITABLE: NEARLY EVERY DAY
5. BEING SO RESTLESS THAT IT IS HARD TO SIT STILL: SEVERAL DAYS
1. FEELING NERVOUS, ANXIOUS, OR ON EDGE: NEARLY EVERY DAY
4. TROUBLE RELAXING: NEARLY EVERY DAY
7. FEELING AFRAID AS IF SOMETHING AWFUL MIGHT HAPPEN: SEVERAL DAYS
7. FEELING AFRAID AS IF SOMETHING AWFUL MIGHT HAPPEN: SEVERAL DAYS
2. NOT BEING ABLE TO STOP OR CONTROL WORRYING: NEARLY EVERY DAY
GAD7 TOTAL SCORE: 17

## 2020-06-08 ASSESSMENT — PATIENT HEALTH QUESTIONNAIRE - PHQ9
SUM OF ALL RESPONSES TO PHQ QUESTIONS 1-9: 7
10. IF YOU CHECKED OFF ANY PROBLEMS, HOW DIFFICULT HAVE THESE PROBLEMS MADE IT FOR YOU TO DO YOUR WORK, TAKE CARE OF THINGS AT HOME, OR GET ALONG WITH OTHER PEOPLE: SOMEWHAT DIFFICULT
SUM OF ALL RESPONSES TO PHQ QUESTIONS 1-9: 7

## 2020-06-08 NOTE — TELEPHONE ENCOUNTER
PHQ 7/9/2019 2/6/2020 6/8/2020   PHQ-9 Total Score 20 8 7   Q9: Thoughts of better off dead/self-harm past 2 weeks Several days Not at all Not at all   F/U: Thoughts of suicide or self-harm No - -   F/U: Safety concerns No - -     TAMIE-7 SCORE 1/22/2019 2/6/2020 6/8/2020   Total Score - - -   Total Score - - 17 (severe anxiety)   Total Score 19 14 17     FYI to PCP  Neel Zarate RN, BSN

## 2020-06-08 NOTE — TELEPHONE ENCOUNTER
Routing refill request to provider for review/approval because:  Labs out of range:  PHQ/TAMIE  Neel Zarate RN, BSN

## 2020-06-08 NOTE — TELEPHONE ENCOUNTER
If she would like to discuss increased dose to better help with anxiety, please schedule virtual visit. SILVIA

## 2020-06-08 NOTE — TELEPHONE ENCOUNTER
Routing refill request to provider for review/approval because:  Labs out of range:  TSH    TSH on 6/5/20 is 4.25  Neel Zarate RN, BSN

## 2020-06-09 ENCOUNTER — ANESTHESIA (OUTPATIENT)
Dept: SURGERY | Facility: CLINIC | Age: 55
End: 2020-06-09
Payer: COMMERCIAL

## 2020-06-09 ENCOUNTER — HOSPITAL ENCOUNTER (OUTPATIENT)
Facility: CLINIC | Age: 55
Discharge: HOME | End: 2020-06-09
Attending: OPHTHALMOLOGY | Admitting: OPHTHALMOLOGY
Payer: COMMERCIAL

## 2020-06-09 VITALS
HEIGHT: 64 IN | SYSTOLIC BLOOD PRESSURE: 124 MMHG | TEMPERATURE: 96.9 F | OXYGEN SATURATION: 98 % | HEART RATE: 57 BPM | RESPIRATION RATE: 18 BRPM | BODY MASS INDEX: 24.55 KG/M2 | DIASTOLIC BLOOD PRESSURE: 63 MMHG | WEIGHT: 143.8 LBS

## 2020-06-09 DIAGNOSIS — H57.819 BROW PTOSIS: Primary | ICD-10-CM

## 2020-06-09 LAB
GLUCOSE BLDC GLUCOMTR-MCNC: 117 MG/DL (ref 70–99)
GLUCOSE BLDC GLUCOMTR-MCNC: 88 MG/DL (ref 70–99)

## 2020-06-09 PROCEDURE — 37000009 ZZH ANESTHESIA TECHNICAL FEE, EACH ADDTL 15 MIN: Performed by: OPHTHALMOLOGY

## 2020-06-09 PROCEDURE — 36000058 ZZH SURGERY LEVEL 3 EA 15 ADDTL MIN: Performed by: OPHTHALMOLOGY

## 2020-06-09 PROCEDURE — 82962 GLUCOSE BLOOD TEST: CPT

## 2020-06-09 PROCEDURE — 71000012 ZZH RECOVERY PHASE 1 LEVEL 1 FIRST HR: Performed by: OPHTHALMOLOGY

## 2020-06-09 PROCEDURE — 25000128 H RX IP 250 OP 636: Performed by: NURSE ANESTHETIST, CERTIFIED REGISTERED

## 2020-06-09 PROCEDURE — 40000170 ZZH STATISTIC PRE-PROCEDURE ASSESSMENT II: Performed by: OPHTHALMOLOGY

## 2020-06-09 PROCEDURE — 25000125 ZZHC RX 250: Performed by: NURSE ANESTHETIST, CERTIFIED REGISTERED

## 2020-06-09 PROCEDURE — 36000056 ZZH SURGERY LEVEL 3 1ST 30 MIN: Performed by: OPHTHALMOLOGY

## 2020-06-09 PROCEDURE — 71000027 ZZH RECOVERY PHASE 2 EACH 15 MINS: Performed by: OPHTHALMOLOGY

## 2020-06-09 PROCEDURE — 37000008 ZZH ANESTHESIA TECHNICAL FEE, 1ST 30 MIN: Performed by: OPHTHALMOLOGY

## 2020-06-09 PROCEDURE — 27210794 ZZH OR GENERAL SUPPLY STERILE: Performed by: OPHTHALMOLOGY

## 2020-06-09 PROCEDURE — 25000125 ZZHC RX 250: Performed by: OPHTHALMOLOGY

## 2020-06-09 PROCEDURE — 25800030 ZZH RX IP 258 OP 636: Performed by: NURSE ANESTHETIST, CERTIFIED REGISTERED

## 2020-06-09 RX ORDER — ERYTHROMYCIN 5 MG/G
0.5 OINTMENT OPHTHALMIC 3 TIMES DAILY
Qty: 2 TUBE | Refills: 0 | Status: ON HOLD | OUTPATIENT
Start: 2020-06-09 | End: 2020-10-29

## 2020-06-09 RX ORDER — LIDOCAINE HCL/EPINEPHRINE/PF 2%-1:200K
VIAL (ML) INJECTION
Status: DISCONTINUED
Start: 2020-06-09 | End: 2020-06-09 | Stop reason: HOSPADM

## 2020-06-09 RX ORDER — NALOXONE HYDROCHLORIDE 0.4 MG/ML
.1-.4 INJECTION, SOLUTION INTRAMUSCULAR; INTRAVENOUS; SUBCUTANEOUS
Status: DISCONTINUED | OUTPATIENT
Start: 2020-06-09 | End: 2020-06-09 | Stop reason: HOSPADM

## 2020-06-09 RX ORDER — HYDROCODONE BITARTRATE AND ACETAMINOPHEN 5; 325 MG/1; MG/1
1 TABLET ORAL EVERY 6 HOURS PRN
Qty: 10 TABLET | Refills: 0 | Status: ON HOLD | OUTPATIENT
Start: 2020-06-09 | End: 2020-06-11

## 2020-06-09 RX ORDER — ONDANSETRON 2 MG/ML
INJECTION INTRAMUSCULAR; INTRAVENOUS PRN
Status: DISCONTINUED | OUTPATIENT
Start: 2020-06-09 | End: 2020-06-09

## 2020-06-09 RX ORDER — TETRACAINE HYDROCHLORIDE 5 MG/ML
SOLUTION OPHTHALMIC
Status: DISCONTINUED
Start: 2020-06-09 | End: 2020-06-09 | Stop reason: HOSPADM

## 2020-06-09 RX ORDER — SODIUM CHLORIDE, SODIUM LACTATE, POTASSIUM CHLORIDE, CALCIUM CHLORIDE 600; 310; 30; 20 MG/100ML; MG/100ML; MG/100ML; MG/100ML
INJECTION, SOLUTION INTRAVENOUS CONTINUOUS PRN
Status: DISCONTINUED | OUTPATIENT
Start: 2020-06-09 | End: 2020-06-09

## 2020-06-09 RX ORDER — ONDANSETRON 2 MG/ML
4 INJECTION INTRAMUSCULAR; INTRAVENOUS EVERY 30 MIN PRN
Status: DISCONTINUED | OUTPATIENT
Start: 2020-06-09 | End: 2020-06-09 | Stop reason: HOSPADM

## 2020-06-09 RX ORDER — FENTANYL CITRATE 50 UG/ML
25-50 INJECTION, SOLUTION INTRAMUSCULAR; INTRAVENOUS
Status: DISCONTINUED | OUTPATIENT
Start: 2020-06-09 | End: 2020-06-09 | Stop reason: HOSPADM

## 2020-06-09 RX ORDER — LIDOCAINE HYDROCHLORIDE 20 MG/ML
INJECTION, SOLUTION INFILTRATION; PERINEURAL PRN
Status: DISCONTINUED | OUTPATIENT
Start: 2020-06-09 | End: 2020-06-09

## 2020-06-09 RX ORDER — MEPERIDINE HYDROCHLORIDE 25 MG/ML
12.5 INJECTION INTRAMUSCULAR; INTRAVENOUS; SUBCUTANEOUS
Status: DISCONTINUED | OUTPATIENT
Start: 2020-06-09 | End: 2020-06-09 | Stop reason: HOSPADM

## 2020-06-09 RX ORDER — SODIUM CHLORIDE, SODIUM LACTATE, POTASSIUM CHLORIDE, CALCIUM CHLORIDE 600; 310; 30; 20 MG/100ML; MG/100ML; MG/100ML; MG/100ML
INJECTION, SOLUTION INTRAVENOUS CONTINUOUS
Status: DISCONTINUED | OUTPATIENT
Start: 2020-06-09 | End: 2020-06-09 | Stop reason: HOSPADM

## 2020-06-09 RX ORDER — ERYTHROMYCIN 5 MG/G
OINTMENT OPHTHALMIC
Status: DISCONTINUED
Start: 2020-06-09 | End: 2020-06-09 | Stop reason: HOSPADM

## 2020-06-09 RX ORDER — ERYTHROMYCIN 5 MG/G
OINTMENT OPHTHALMIC PRN
Status: DISCONTINUED | OUTPATIENT
Start: 2020-06-09 | End: 2020-06-09 | Stop reason: HOSPADM

## 2020-06-09 RX ORDER — BUPIVACAINE HYDROCHLORIDE AND EPINEPHRINE 5; 5 MG/ML; UG/ML
INJECTION, SOLUTION EPIDURAL; INTRACAUDAL; PERINEURAL
Status: DISCONTINUED
Start: 2020-06-09 | End: 2020-06-09 | Stop reason: HOSPADM

## 2020-06-09 RX ORDER — PROPOFOL 10 MG/ML
INJECTION, EMULSION INTRAVENOUS PRN
Status: DISCONTINUED | OUTPATIENT
Start: 2020-06-09 | End: 2020-06-09

## 2020-06-09 RX ORDER — ONDANSETRON 4 MG/1
4 TABLET, ORALLY DISINTEGRATING ORAL EVERY 30 MIN PRN
Status: DISCONTINUED | OUTPATIENT
Start: 2020-06-09 | End: 2020-06-09 | Stop reason: HOSPADM

## 2020-06-09 RX ORDER — HYDROMORPHONE HYDROCHLORIDE 1 MG/ML
.3-.5 INJECTION, SOLUTION INTRAMUSCULAR; INTRAVENOUS; SUBCUTANEOUS EVERY 10 MIN PRN
Status: DISCONTINUED | OUTPATIENT
Start: 2020-06-09 | End: 2020-06-09 | Stop reason: HOSPADM

## 2020-06-09 RX ORDER — TETRACAINE HYDROCHLORIDE 5 MG/ML
SOLUTION OPHTHALMIC PRN
Status: DISCONTINUED | OUTPATIENT
Start: 2020-06-09 | End: 2020-06-09 | Stop reason: HOSPADM

## 2020-06-09 RX ADMIN — MIDAZOLAM 2 MG: 1 INJECTION INTRAMUSCULAR; INTRAVENOUS at 10:00

## 2020-06-09 RX ADMIN — PROPOFOL 30 MG: 10 INJECTION, EMULSION INTRAVENOUS at 10:04

## 2020-06-09 RX ADMIN — PROPOFOL 10 MG: 10 INJECTION, EMULSION INTRAVENOUS at 10:06

## 2020-06-09 RX ADMIN — SODIUM CHLORIDE, POTASSIUM CHLORIDE, SODIUM LACTATE AND CALCIUM CHLORIDE: 600; 310; 30; 20 INJECTION, SOLUTION INTRAVENOUS at 09:57

## 2020-06-09 RX ADMIN — LIDOCAINE HYDROCHLORIDE 20 MG: 20 INJECTION, SOLUTION INFILTRATION; PERINEURAL at 10:04

## 2020-06-09 RX ADMIN — ONDANSETRON 4 MG: 2 INJECTION INTRAMUSCULAR; INTRAVENOUS at 10:09

## 2020-06-09 ASSESSMENT — ENCOUNTER SYMPTOMS
ORTHOPNEA: 0
SEIZURES: 0

## 2020-06-09 ASSESSMENT — ANXIETY QUESTIONNAIRES: GAD7 TOTAL SCORE: 17

## 2020-06-09 ASSESSMENT — PATIENT HEALTH QUESTIONNAIRE - PHQ9: SUM OF ALL RESPONSES TO PHQ QUESTIONS 1-9: 7

## 2020-06-09 ASSESSMENT — MIFFLIN-ST. JEOR: SCORE: 1222.27

## 2020-06-09 NOTE — OP NOTE
"Pre-operative Diagnosis:   1. Bilateral upper brow ptosis, visually significant (lid-brow transition well below rim)     Post-operative Diagnosis:  1. Same as above      Procedure(s):   1. Bilateral direct browplasty (brow ptosis repair)     Surgeon: Denise Alberts MD      Anesthesia: Monitored anesthesia care with local anesthetic      Blood loss: <5 cc      Specimens: None     Findings: See operative report for details       Complications: None      Operative Procedure:  In the pre operative area, the planned procedure was again discussed with the patient as well as the risks/benefits/alternatives. The patient's negative COVD-19 testing was confirmed.  The patient was brought to the operating room.  A \"time out\" was performed to ensure the correct surgical site was being operated on.  Topical anesthesia was placed in both eyes.  The eyelid skin was cleaned with isopropyl alcohol.  Browplasty markings were made using the patient's natural creases with care taken to lift the brow above the orbital rim.  Care was taken to avoid any post operative lagophthalmos.  Local anesthetic was injected.  The patient was prepped and draped in the usual sterile fashion.     Attention was directed to the direct browplasty.  An incision was made with a #15 blade through the skin, along the previously placed markings.  Dissection was continued in a subcutaneous to excise the tissue.  Hemostasis was achieved with cautery.  Deep tissue was closed with 4-0 vicryl sutures in a buried, interrupted fashion.  The skin was closed with 5-0 Prolene in a running fashion. The same procedure was performed on the other side.           The eyelids/brows were washed with wet 4x4 gauze.  Antibiotic ointment was placed on the incisions.  The patient was transferred to recovery in stable position.  Ice packs were placed on each eyelid.  The patient will return for a post-operative follow up appointment, sooner with any decrease in vision, increase " in pain, redness, or bleeding.         Denise Alberts MD

## 2020-06-09 NOTE — DISCHARGE INSTRUCTIONS
Same Day Surgery Discharge Instructions for  Sedation and General Anesthesia       It's not unusual to feel dizzy, light-headed or faint for up to 24 hours after surgery or while taking pain medication.  If you have these symptoms: sit for a few minutes before standing and have someone assist you when you get up to walk or use the bathroom.      You should rest and relax for the next 24 hours. We recommend you make arrangements to have an adult stay with you for at least 24 hours after your discharge.  Avoid hazardous and strenuous activity.      DO NOT DRIVE any vehicle or operate mechanical equipment for 24 hours following the end of your surgery.  Even though you may feel normal, your reactions may be affected by the medication you have received.      Do not drink alcoholic beverages for 24 hours following surgery.       Slowly progress to your regular diet as you feel able. It's not unusual to feel nauseated and/or vomit after receiving anesthesia.  If you develop these symptoms, drink clear liquids (apple juice, ginger ale, broth, 7-up, etc. ) until you feel better.  If your nausea and vomiting persists for 24 hours, please notify your surgeon.        All narcotic pain medications, along with inactivity and anesthesia, can cause constipation. Drinking plenty of liquids and increasing fiber intake will help.      For any questions of a medical nature, call your surgeon.      Do not make important decisions for 24 hours.      If you had general anesthesia, you may have a sore throat for a couple of days related to the breathing tube used during surgery.  You may use Cepacol lozenges to help with this discomfort.  If it worsens or if you develop a fever, contact your surgeon.       If you feel your pain is not well managed with the pain medications prescribed by your surgeon, please contact your surgeon's office to let them know so they can address your concerns.       CoVid 19 Information    We want to give you  information regarding Covid. Please consult your primary care provider with any questions you might have.     Patient who have symptoms (cough, fever, or shortness of breath), need to isolate for 7 days from when symptoms started OR 72 hours after fever resolves (without fever reducing medications) AND improvement of respiratory symptoms (whichever is longer).      Isolate yourself at home (in own room/own bathroom if possible)    Do Not allow any visitors    Do Not go to work or school    Do Not go to Christianity,  centers, shopping, or other public places.    Do Not shake hands.    Avoid close and intimate contact with others (hugging, kissing).    Follow CDC recommendations for household cleaning of frequently touched services.     After the initial 7 days, continue to isolate yourself from household members as much as possible. To continue decrease the risk of community spread and exposure, you and any members of your household should limit activities in public for 14 days after starting home isolation.     You can reference the following CDC link for helpful home isolation/care tips:  https://www.cdc.gov/coronavirus/2019-ncov/downloads/10Things.pdf    Protect Others:    Cover Your Mouth and Nose with a mask, disposable tissue or wash cloth to avoid spreading germs to others.    Wash your hands and face frequently with soap and water    Call Your Primary Doctor If: Breathing difficulty develops or you become worse.    For more information about COVID19 and options for caring for yourself at home, please visit the CDC website at https://www.cdc.gov/coronavirus/2019-ncov/about/steps-when-sick.html  For more options for care at [facility], please visit our website at [email or web address]        [facility]   Eyelid/Orbital Surgery Discharge Instructions  Dr. Denise Alberts     ICE COMPRESSES  Immediately following surgery, you should begin to apply ice  compresses.  Apply a cold gel pack or wrap a clean washcloth around a cup of crushed ice in a plastic bag (a bag of frozen peas also works well) and hold the cold compresses directly against the closed eyelid (s).Apply cold pack for a minimum of six times daily for no longer than 15 minutes at a time. Continue cold compresses every day until the bruising and swelling begin to subside.  This can vary for each patient, but three days may be common.    HOT COMPRESSES  After your swelling and bruising have begun to subside, hot compresses should be applied.  Take a clean washcloth and wring it out in hot water (as warm as you can tolerate comfortably).  Hold this warm compress against the closed eyelid(s) at least six times per day for 15 minutes.  This should be continued for about two weeks.    OINTMENT  You may be given some ointment when you leave the hospital.  Apply this ointment as directed per pharmacy label for 7 days.  Expect some blurring of vision from the ointment.     ACTIVITY  Avoid heavy lifting or vigorous exercise for one week after surgery.  You may resume regular activities as tolerated.  You may shower and wash your hair on the day after surgery; be careful to avoid soaking the wounds or getting shampoo in your eyes. While your eyes are still swollen, it is recommended you sleep on your back and elevate your head with 2-3 pillows.    MEDICATION  If the doctor has given you some medications to take after surgery, please take these according to the instructions on the bottle.  Pain medications may make you drowsy so do not drive, operate heavy machinery, or use alcohol while taking it.  When you feel that you do not need the prescription pain medication, you may substitute Extra Strength Tylenol for mild pain by also following the directions on the bottle.    If you were taking Aspirin prior to your surgery, you may resume this medication tomorrow.    If you were on an anticoagulant, you may resume  taking it with the next scheduled dose.      WHAT TO EXPECT  You should expect some slight oozing of blood from the incision site over the first two to three days after surgery.  Swelling and bruising will occur for one to two weeks or longer.  You may also experience itching and tearing during the first several weeks after surgery.  This is part of the normal healing process    QUESTIONS  Please feel free to contact the office, should you have any questions that are not answered above.  The phone number is [phone].  Please call immediately if you are unable to establish vision in the operative eye, you are experiencing heavy bleeding that will not stop with gentle pressure or you have any signs of an infection (greenish/yellow discharge or progressive redness).    [facility]  [address]  [place], [place] [address] [phone]    **If you have questions or concerns about your procedure,  call Dr. Alberts at [phone]**

## 2020-06-09 NOTE — ANESTHESIA CARE TRANSFER NOTE
Patient: Lynn Thompson    Procedure(s):  BILATERAL BROW PTOSIS REPAIR    Diagnosis: Brow ptosis [H57.819]  Diagnosis Additional Information: No value filed.    Anesthesia Type:   MAC     Note:  Airway :Room Air  Patient transferred to:PACU  Comments: At end of procedure, spontaneous respirations, patient alert to voice, able to follow commands. Patient breathing room air  to PACU. SpO2, NiBP, and EKG monitors and alarms on and functioning, Lonnie Hugger warmer connected to patient gown, report on patient's clinical status given to PACU RN, RN questions answered.Handoff Report: Identifed the Patient, Identified the Reponsible Provider, Reviewed the pertinent medical history, Discussed the surgical course, Reviewed Intra-OP anesthesia mangement and issues during anesthesia, Set expectations for post-procedure period and Allowed opportunity for questions and acknowledgement of understanding      Vitals: (Last set prior to Anesthesia Care Transfer)    CRNA VITALS  6/9/2020 1003 - 6/9/2020 1038      6/9/2020             Ht Rate:  66    Resp Rate (set):  10                Electronically Signed By: MITZY Snyder CRNA  June 9, 2020  10:38 AM

## 2020-06-09 NOTE — ANESTHESIA POSTPROCEDURE EVALUATION
Patient: Lynn Thompson    Procedure(s):  BILATERAL BROW PTOSIS REPAIR    Diagnosis:Brow ptosis [H57.819]  Diagnosis Additional Information: No value filed.    Anesthesia Type:  MAC    Note:  Anesthesia Post Evaluation    Patient location during evaluation: PACU  Patient participation: Able to fully participate in evaluation  Level of consciousness: awake  Pain management: adequate  Airway patency: patent  Cardiovascular status: acceptable  Respiratory status: acceptable  Hydration status: acceptable  PONV: none     Anesthetic complications: None          Last vitals:  Vitals:    06/09/20 1100 06/09/20 1115 06/09/20 1149   BP: 118/57 116/53 124/63   Pulse: 61 59 57   Resp: 11 20 18   Temp:      SpO2: 97% 97% 98%         Electronically Signed By: Alen Morales MD  June 9, 2020  3:10 PM

## 2020-06-09 NOTE — ANESTHESIA PREPROCEDURE EVALUATION
Anesthesia Pre-Procedure Evaluation    Patient: Lynn Thompson   MRN: [id number] : 1965          Preoperative Diagnosis: Brow ptosis [H57.819]    Procedure(s):  BILATERAL BROW PTOSIS REPAIR    Past Medical History:   Diagnosis Date     Abdominal pain, epigastric , ongoing; goes to pain clinic    probable recurrent spasm sphincter of Oddi causing biliary colic pains      Benign paroxysmal positional vertigo     occ.      Calculus of kidney 5/05    x1 on L side passed, several stones.  Has been tested for oxalate.     Chronic abdominal pain 2013     Chronic pancreatitis (H) 2013     Depressive disorder, not elsewhere classified     also occ panic spells     Diabetes (H)     post pancreatectomy     Dyspepsia and other specified disorders of function of stomach     H. pylori   treated     Headache(784.0)     still periodic HA's ;  often 5X/week     Hypertension 16    Stress related     Iron deficiency anemia secondary to inadequate dietary iron intake     relates to gastric bypass     Other chronic pain      Post-pancreatectomy diabetes (H) 2013     Pyelonephritis, unspecified , 10/8    L side     Regional enteritis of unspecified site     inacive/remission     Sleep apnea     uses splint     Spasm of sphincter of Oddi     ERCP with Stent of CBD by Fernandez @ [facility] with sx relief     Spasm of sphincter of Oddi     surgical + endoscopic stenting of pancreatic duct @ [facility] 06     Thyroid nodule 2016     Ureteral calculus 10/2/2012     Vaccination not carried out      Past Surgical History:   Procedure Laterality Date     APPENDECTOMY       C NONSPECIFIC PROCEDURE      R bunion     C NONSPECIFIC PROCEDURE      T & A     C NONSPECIFIC PROCEDURE      partial ileum resection     C NONSPECIFIC PROCEDURE      R ovarian cyst     C NONSPECIFIC PROCEDURE           C NONSPECIFIC PROCEDURE      GALL BLADDER     C NONSPECIFIC  PROCEDURE  9/02    CBD stent; Dr. Fernandez MAY NONSPECIFIC PROCEDURE  6/02 & 12/05    colonoscopy     C NONSPECIFIC PROCEDURE  4/03    Gastric bypass     CHOLECYSTECTOMY       COLONOSCOPY       COMBINED CYSTOSCOPY, RETROGRADES, URETEROSCOPY, LASER HOLMIUM LITHOTRIPSY URETER(S), INSERT STENT Right 3/23/2015    Procedure: COMBINED CYSTOSCOPY, RETROGRADES, URETEROSCOPY, LASER HOLMIUM LITHOTRIPSY URETER(S), INSERT STENT;  Surgeon: Kennedi Aldana MD;  Location: [facility] OR     COMBINED CYSTOSCOPY, RETROGRADES, URETEROSCOPY, LASER HOLMIUM LITHOTRIPSY URETER(S), INSERT STENT Right 4/20/2015    Procedure: COMBINED CYSTOSCOPY, RETROGRADES, URETEROSCOPY, LASER HOLMIUM LITHOTRIPSY URETER(S), INSERT STENT;  Surgeon: Kennedi Aldana MD;  Location: [facility] OR     COSMETIC SURGERY  6/24/2002    Tummy tuck     CYSTOSCOPY, RETROGRADES, INSERT STENT URETER(S), COMBINED  10/2/2012    Procedure: COMBINED CYSTOSCOPY, RETROGRADES, INSERT STENT URETER(S);  COMBINED CYSTOSCOPY,  , INSERT LEFT STENT URETER;  Surgeon: Johny Baez MD;  Location: [facility] OR     ENT SURGERY       ESOPHAGOSCOPY, GASTROSCOPY, DUODENOSCOPY (EGD), COMBINED N/A 1/24/2018    Procedure: COMBINED ESOPHAGOSCOPY, GASTROSCOPY, DUODENOSCOPY (EGD);  ESOPHAGOSCOPY, GASTROSCOPY, DUODENOSCOPY (EGD)    ;  Surgeon: Tamir Rodgers MD;  Location:  GI     EXTRACORPOREAL SHOCK WAVE LITHOTRIPSY (ESWL)  10/16/2012    Procedure: EXTRACORPOREAL SHOCK WAVE LITHOTRIPSY (ESWL);  left EXTRACORPOREAL SHOCK WAVE LITHOTRIPSY (ESWL) ;  Surgeon: Johny Baez MD;  Location: [facility] OR     GI SURGERY  12/29/2015    Small bowel obstruction     GI SURGERY  2003    gastric bypass     HC LAP,LYSIS OF ADHESIONS       HERNIA REPAIR  2/2015     LAPAROSCOPIC LYSIS ADHESIONS N/A 2/20/2015    Procedure: LAPAROSCOPIC LYSIS ADHESIONS;  Surgeon: Aaron Early MD;  [facility]     LAPAROSCOPIC LYSIS ADHESIONS N/A 12/29/2015    Procedure: LAPAROSCOPIC LYSIS ADHESIONS;  Surgeon: Aaron Early  MD Triny;  Location: [facility]     PANCREATECTOMY, TRANSPLANT AUTO ISLET CELL, COMBINED  5/10/2013    Procedure: COMBINED PANCREATECTOMY, TRANSPLANT AUTO ISLET CELL;  Pancreatectomy, Auto Islet Cell Transplant   hernia repair, jejunostomy tube and liver biopsies with Anesthesia General with block;  Surgeon: Aaron Early MD;  Location: [facility] OR     TRANSPLANT  5/10/13     EKG - SR, nl axis    Anesthesia Evaluation     . Pt has had prior anesthetic.     No history of anesthetic complications          ROS/MED HX    ENT/Pulmonary: Comment: Brow ptosis    (+)sleep apnea (uses oral appliance), doesn't use CPAP , . .   (-) recent URI   Neurologic: Comment: Vertigo     (-) seizures, CVA and migraines   Cardiovascular:     (+) hypertension----. : . . . :. .      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      Psychiatric:  - neg psychiatric ROS       Infectious Disease:         Malignancy:         Other:    (+) H/O Chronic Pain,                        Physical Exam  Normal systems: cardiovascular, pulmonary and dental    Airway   Mallampati: II  TM distance: >3 FB  Neck ROM: full    Dental     Cardiovascular   Rhythm and rate: regular and normal      Pulmonary    breath sounds clear to auscultation            Lab Results   Component Value Date    WBC 8.3 06/05/2020    HGB 12.5 06/05/2020    HCT 39.0 06/05/2020     06/05/2020    SED 6 10/25/2007     06/05/2020    POTASSIUM 3.6 06/05/2020    CHLORIDE 107 06/05/2020    CO2 27 06/05/2020    BUN 14 06/05/2020    CR 0.89 06/05/2020     " (H) 06/05/2020    VALARIE 8.9 06/05/2020    PHOS 2.9 01/01/2016    MAG 2.0 10/31/2019    ALBUMIN 3.4 06/05/2020    PROTTOTAL 7.2 06/05/2020    ALT 32 06/05/2020    AST 25 06/05/2020    ALKPHOS 108 06/05/2020    BILITOTAL 0.3 06/05/2020    LIPASE 44 (L) 10/08/2018    AMYLASE 268 (H) 05/11/2013    PTT 36 05/16/2013    INR 1.03 05/10/2016    FIBR 168 (L) 05/10/2013    TSH 4.25 (H) 06/05/2020    T4 1.05 06/05/2020    HCG Negative 05/21/2014    HCGS Negative 12/30/2015       Preop Vitals  BP Readings from Last 3 Encounters:   06/09/20 130/52   06/05/20 122/70   02/06/20 (!) 143/82    Pulse Readings from Last 3 Encounters:   06/09/20 68   06/05/20 96   02/06/20 68      Resp Readings from Last 3 Encounters:   06/05/20 17   02/06/20 16   10/31/19 14    SpO2 Readings from Last 3 Encounters:   06/09/20 95%   06/05/20 98%   02/06/20 98%      Temp Readings from Last 1 Encounters:   06/09/20 36.8  C (98.2  F) (Temporal)    Ht Readings from Last 1 Encounters:   06/09/20 1.626 m (5' 4\")      Wt Readings from Last 1 Encounters:   06/09/20 65.2 kg (143 lb 12.8 oz)    Estimated body mass index is 24.68 kg/m  as calculated from the following:    Height as of this encounter: 1.626 m (5' 4\").    Weight as of this encounter: 65.2 kg (143 lb 12.8 oz).       Anesthesia Plan      History & Physical Review  History and physical reviewed and following examination; no interval change.    ASA Status:  2 .    NPO Status:  > 8 hours    Plan for MAC Reason for MAC:  Procedure to face, neck, head or breast  PONV prophylaxis:  Ondansetron (or other 5HT-3)  NO STEROIDS        Postoperative Care  Postoperative pain management:  IV analgesics and Multi-modal analgesia.      Consents  Anesthetic plan, risks, benefits and alternatives discussed with:  Patient..                 Alen Morales MD  "

## 2020-06-11 ENCOUNTER — HOSPITAL ENCOUNTER (INPATIENT)
Facility: CLINIC | Age: 55
LOS: 2 days | Discharge: HOME | DRG: 872 | End: 2020-06-13
Attending: EMERGENCY MEDICINE | Admitting: INTERNAL MEDICINE
Payer: COMMERCIAL

## 2020-06-11 ENCOUNTER — APPOINTMENT (OUTPATIENT)
Dept: GENERAL RADIOLOGY | Facility: CLINIC | Age: 55
DRG: 872 | End: 2020-06-11
Attending: EMERGENCY MEDICINE
Payer: COMMERCIAL

## 2020-06-11 DIAGNOSIS — N10 PYELONEPHRITIS, ACUTE: Primary | ICD-10-CM

## 2020-06-11 DIAGNOSIS — N30.00 ACUTE CYSTITIS WITHOUT HEMATURIA: ICD-10-CM

## 2020-06-11 PROBLEM — R50.9 FEVER: Status: ACTIVE | Noted: 2020-06-11

## 2020-06-11 LAB
ALBUMIN SERPL-MCNC: 3 G/DL (ref 3.4–5)
ALBUMIN UR-MCNC: 10 MG/DL
ALP SERPL-CCNC: 107 U/L (ref 40–150)
ALT SERPL W P-5'-P-CCNC: 26 U/L (ref 0–50)
ANION GAP SERPL CALCULATED.3IONS-SCNC: 6 MMOL/L (ref 3–14)
APPEARANCE UR: ABNORMAL
AST SERPL W P-5'-P-CCNC: 19 U/L (ref 0–45)
BACTERIA #/AREA URNS HPF: ABNORMAL /HPF
BASOPHILS # BLD AUTO: 0.1 10E9/L (ref 0–0.2)
BASOPHILS NFR BLD AUTO: 0.5 %
BILIRUB SERPL-MCNC: 0.4 MG/DL (ref 0.2–1.3)
BILIRUB UR QL STRIP: NEGATIVE
BUN SERPL-MCNC: 16 MG/DL (ref 7–30)
CALCIUM SERPL-MCNC: 8.3 MG/DL (ref 8.5–10.1)
CHLORIDE SERPL-SCNC: 106 MMOL/L (ref 94–109)
CO2 SERPL-SCNC: 27 MMOL/L (ref 20–32)
COLOR UR AUTO: ABNORMAL
CREAT SERPL-MCNC: 0.9 MG/DL (ref 0.52–1.04)
DIFFERENTIAL METHOD BLD: ABNORMAL
EOSINOPHIL # BLD AUTO: 0.1 10E9/L (ref 0–0.7)
EOSINOPHIL NFR BLD AUTO: 0.5 %
ERYTHROCYTE [DISTWIDTH] IN BLOOD BY AUTOMATED COUNT: 14.1 % (ref 10–15)
GFR SERPL CREATININE-BSD FRML MDRD: 71 ML/MIN/{1.73_M2}
GLUCOSE BLDC GLUCOMTR-MCNC: 117 MG/DL (ref 70–99)
GLUCOSE BLDC GLUCOMTR-MCNC: 122 MG/DL (ref 70–99)
GLUCOSE BLDC GLUCOMTR-MCNC: 246 MG/DL (ref 70–99)
GLUCOSE SERPL-MCNC: 123 MG/DL (ref 70–99)
GLUCOSE UR STRIP-MCNC: NEGATIVE MG/DL
HCT VFR BLD AUTO: 36.6 % (ref 35–47)
HGB BLD-MCNC: 11.7 G/DL (ref 11.7–15.7)
HGB UR QL STRIP: ABNORMAL
IMM GRANULOCYTES # BLD: 0.1 10E9/L (ref 0–0.4)
IMM GRANULOCYTES NFR BLD: 0.5 %
KETONES UR STRIP-MCNC: NEGATIVE MG/DL
LACTATE BLD-SCNC: 0.7 MMOL/L (ref 0.7–2)
LEUKOCYTE ESTERASE UR QL STRIP: ABNORMAL
LYMPHOCYTES # BLD AUTO: 1.2 10E9/L (ref 0.8–5.3)
LYMPHOCYTES NFR BLD AUTO: 7.2 %
MCH RBC QN AUTO: 30.2 PG (ref 26.5–33)
MCHC RBC AUTO-ENTMCNC: 32 G/DL (ref 31.5–36.5)
MCV RBC AUTO: 94 FL (ref 78–100)
MONOCYTES # BLD AUTO: 1.7 10E9/L (ref 0–1.3)
MONOCYTES NFR BLD AUTO: 9.9 %
MUCOUS THREADS #/AREA URNS LPF: PRESENT /LPF
NEUTROPHILS # BLD AUTO: 13.8 10E9/L (ref 1.6–8.3)
NEUTROPHILS NFR BLD AUTO: 81.4 %
NITRATE UR QL: POSITIVE
NRBC # BLD AUTO: 0 10*3/UL
NRBC BLD AUTO-RTO: 0 /100
PH UR STRIP: 5.5 PH (ref 5–7)
PLATELET # BLD AUTO: 299 10E9/L (ref 150–450)
POTASSIUM SERPL-SCNC: 3.3 MMOL/L (ref 3.4–5.3)
POTASSIUM SERPL-SCNC: 4.6 MMOL/L (ref 3.4–5.3)
PROT SERPL-MCNC: 6.4 G/DL (ref 6.8–8.8)
RBC # BLD AUTO: 3.88 10E12/L (ref 3.8–5.2)
RBC #/AREA URNS AUTO: 4 /HPF (ref 0–2)
SARS-COV-2 PCR COMMENT: NORMAL
SARS-COV-2 RNA SPEC QL NAA+PROBE: NEGATIVE
SARS-COV-2 RNA SPEC QL NAA+PROBE: NORMAL
SODIUM SERPL-SCNC: 139 MMOL/L (ref 133–144)
SOURCE: ABNORMAL
SP GR UR STRIP: 1.01 (ref 1–1.03)
SPECIMEN SOURCE: NORMAL
SPECIMEN SOURCE: NORMAL
SQUAMOUS #/AREA URNS AUTO: 3 /HPF (ref 0–1)
TRANS CELLS #/AREA URNS HPF: <1 /HPF (ref 0–1)
UROBILINOGEN UR STRIP-MCNC: NORMAL MG/DL (ref 0–2)
WBC # BLD AUTO: 17 10E9/L (ref 4–11)
WBC #/AREA URNS AUTO: >182 /HPF (ref 0–5)
WBC CLUMPS #/AREA URNS HPF: PRESENT /HPF

## 2020-06-11 PROCEDURE — 25000128 H RX IP 250 OP 636: Performed by: EMERGENCY MEDICINE

## 2020-06-11 PROCEDURE — C9803 HOPD COVID-19 SPEC COLLECT: HCPCS

## 2020-06-11 PROCEDURE — 00000146 ZZHCL STATISTIC GLUCOSE BY METER IP

## 2020-06-11 PROCEDURE — 99207 ZZC APP CREDIT; MD BILLING SHARED VISIT: CPT | Performed by: PHYSICIAN ASSISTANT

## 2020-06-11 PROCEDURE — 99207 ZZC CDG-CHARGE REQUIRED MANUAL ENTRY: CPT | Performed by: INTERNAL MEDICINE

## 2020-06-11 PROCEDURE — U0003 INFECTIOUS AGENT DETECTION BY NUCLEIC ACID (DNA OR RNA); SEVERE ACUTE RESPIRATORY SYNDROME CORONAVIRUS 2 (SARS-COV-2) (CORONAVIRUS DISEASE [COVID-19]), AMPLIFIED PROBE TECHNIQUE, MAKING USE OF HIGH THROUGHPUT TECHNOLOGIES AS DESCRIBED BY CMS-2020-01-R: HCPCS | Performed by: EMERGENCY MEDICINE

## 2020-06-11 PROCEDURE — 96366 THER/PROPH/DIAG IV INF ADDON: CPT

## 2020-06-11 PROCEDURE — 80053 COMPREHEN METABOLIC PANEL: CPT | Performed by: EMERGENCY MEDICINE

## 2020-06-11 PROCEDURE — 87086 URINE CULTURE/COLONY COUNT: CPT | Performed by: EMERGENCY MEDICINE

## 2020-06-11 PROCEDURE — 96375 TX/PRO/DX INJ NEW DRUG ADDON: CPT

## 2020-06-11 PROCEDURE — 99285 EMERGENCY DEPT VISIT HI MDM: CPT | Mod: 25

## 2020-06-11 PROCEDURE — 96365 THER/PROPH/DIAG IV INF INIT: CPT

## 2020-06-11 PROCEDURE — 99223 1ST HOSP IP/OBS HIGH 75: CPT | Mod: AI | Performed by: INTERNAL MEDICINE

## 2020-06-11 PROCEDURE — 87186 SC STD MICRODIL/AGAR DIL: CPT | Performed by: EMERGENCY MEDICINE

## 2020-06-11 PROCEDURE — 36415 COLL VENOUS BLD VENIPUNCTURE: CPT | Performed by: INTERNAL MEDICINE

## 2020-06-11 PROCEDURE — 83605 ASSAY OF LACTIC ACID: CPT | Performed by: EMERGENCY MEDICINE

## 2020-06-11 PROCEDURE — 25000128 H RX IP 250 OP 636: Performed by: PHYSICIAN ASSISTANT

## 2020-06-11 PROCEDURE — 25000132 ZZH RX MED GY IP 250 OP 250 PS 637: Performed by: INTERNAL MEDICINE

## 2020-06-11 PROCEDURE — 87040 BLOOD CULTURE FOR BACTERIA: CPT | Performed by: EMERGENCY MEDICINE

## 2020-06-11 PROCEDURE — 85025 COMPLETE CBC W/AUTO DIFF WBC: CPT | Performed by: EMERGENCY MEDICINE

## 2020-06-11 PROCEDURE — 25000131 ZZH RX MED GY IP 250 OP 636 PS 637: Performed by: PHYSICIAN ASSISTANT

## 2020-06-11 PROCEDURE — 87088 URINE BACTERIA CULTURE: CPT | Performed by: EMERGENCY MEDICINE

## 2020-06-11 PROCEDURE — 84132 ASSAY OF SERUM POTASSIUM: CPT | Performed by: INTERNAL MEDICINE

## 2020-06-11 PROCEDURE — 71045 X-RAY EXAM CHEST 1 VIEW: CPT

## 2020-06-11 PROCEDURE — 25000131 ZZH RX MED GY IP 250 OP 636 PS 637: Performed by: INTERNAL MEDICINE

## 2020-06-11 PROCEDURE — 96361 HYDRATE IV INFUSION ADD-ON: CPT

## 2020-06-11 PROCEDURE — 81001 URINALYSIS AUTO W/SCOPE: CPT | Performed by: EMERGENCY MEDICINE

## 2020-06-11 PROCEDURE — 25000132 ZZH RX MED GY IP 250 OP 250 PS 637: Performed by: PHYSICIAN ASSISTANT

## 2020-06-11 PROCEDURE — 12000000 ZZH R&B MED SURG/OB

## 2020-06-11 PROCEDURE — 25000128 H RX IP 250 OP 636: Performed by: INTERNAL MEDICINE

## 2020-06-11 PROCEDURE — 25800030 ZZH RX IP 258 OP 636: Performed by: EMERGENCY MEDICINE

## 2020-06-11 RX ORDER — LIDOCAINE 40 MG/G
CREAM TOPICAL
Status: DISCONTINUED | OUTPATIENT
Start: 2020-06-11 | End: 2020-06-13 | Stop reason: HOSPADM

## 2020-06-11 RX ORDER — POTASSIUM CHLORIDE 1.5 G/1.58G
20-40 POWDER, FOR SOLUTION ORAL
Status: DISCONTINUED | OUTPATIENT
Start: 2020-06-11 | End: 2020-06-13 | Stop reason: HOSPADM

## 2020-06-11 RX ORDER — ONDANSETRON 2 MG/ML
4 INJECTION INTRAMUSCULAR; INTRAVENOUS EVERY 6 HOURS PRN
Status: DISCONTINUED | OUTPATIENT
Start: 2020-06-11 | End: 2020-06-13 | Stop reason: HOSPADM

## 2020-06-11 RX ORDER — IBUPROFEN 400 MG/1
400 TABLET, FILM COATED ORAL EVERY 6 HOURS PRN
Status: DISCONTINUED | OUTPATIENT
Start: 2020-06-11 | End: 2020-06-13 | Stop reason: HOSPADM

## 2020-06-11 RX ORDER — POTASSIUM CHLORIDE 1500 MG/1
20-40 TABLET, EXTENDED RELEASE ORAL
Status: DISCONTINUED | OUTPATIENT
Start: 2020-06-11 | End: 2020-06-13 | Stop reason: HOSPADM

## 2020-06-11 RX ORDER — CEFTRIAXONE 2 G/1
2 INJECTION, POWDER, FOR SOLUTION INTRAMUSCULAR; INTRAVENOUS EVERY 24 HOURS
Status: DISCONTINUED | OUTPATIENT
Start: 2020-06-12 | End: 2020-06-13 | Stop reason: HOSPADM

## 2020-06-11 RX ORDER — SODIUM CHLORIDE AND POTASSIUM CHLORIDE 150; 900 MG/100ML; MG/100ML
INJECTION, SOLUTION INTRAVENOUS CONTINUOUS
Status: DISPENSED | OUTPATIENT
Start: 2020-06-11 | End: 2020-06-11

## 2020-06-11 RX ORDER — ONDANSETRON 4 MG/1
4 TABLET, ORALLY DISINTEGRATING ORAL EVERY 8 HOURS PRN
Status: ON HOLD | COMMUNITY
End: 2020-10-29

## 2020-06-11 RX ORDER — ERYTHROMYCIN 5 MG/G
0.5 OINTMENT OPHTHALMIC 3 TIMES DAILY
Status: DISCONTINUED | OUTPATIENT
Start: 2020-06-11 | End: 2020-06-13 | Stop reason: HOSPADM

## 2020-06-11 RX ORDER — LEVOTHYROXINE SODIUM 100 UG/1
100 TABLET ORAL DAILY
Status: DISCONTINUED | OUTPATIENT
Start: 2020-06-12 | End: 2020-06-13 | Stop reason: HOSPADM

## 2020-06-11 RX ORDER — INSULIN ASPART 100 [IU]/ML
INJECTION, SOLUTION INTRAVENOUS; SUBCUTANEOUS
Status: ON HOLD | COMMUNITY
End: 2020-06-11

## 2020-06-11 RX ORDER — TRAZODONE HYDROCHLORIDE 50 MG/1
50 TABLET, FILM COATED ORAL AT BEDTIME
Status: DISCONTINUED | OUTPATIENT
Start: 2020-06-11 | End: 2020-06-13 | Stop reason: HOSPADM

## 2020-06-11 RX ORDER — MODAFINIL 200 MG/1
400 TABLET ORAL DAILY
Status: ON HOLD | COMMUNITY
End: 2023-07-01

## 2020-06-11 RX ORDER — ACETAMINOPHEN 325 MG/1
650 TABLET ORAL EVERY 4 HOURS PRN
Status: DISCONTINUED | OUTPATIENT
Start: 2020-06-11 | End: 2020-06-13 | Stop reason: HOSPADM

## 2020-06-11 RX ORDER — ONDANSETRON 4 MG/1
4 TABLET, ORALLY DISINTEGRATING ORAL EVERY 6 HOURS PRN
Status: DISCONTINUED | OUTPATIENT
Start: 2020-06-11 | End: 2020-06-13 | Stop reason: HOSPADM

## 2020-06-11 RX ORDER — OXYCODONE HYDROCHLORIDE 5 MG/1
5 TABLET ORAL EVERY 4 HOURS PRN
Status: DISCONTINUED | OUTPATIENT
Start: 2020-06-11 | End: 2020-06-13 | Stop reason: HOSPADM

## 2020-06-11 RX ORDER — FAMOTIDINE 20 MG/1
40 TABLET, FILM COATED ORAL 2 TIMES DAILY PRN
Status: DISCONTINUED | OUTPATIENT
Start: 2020-06-11 | End: 2020-06-13 | Stop reason: HOSPADM

## 2020-06-11 RX ORDER — INSULIN ASPART 100 [IU]/ML
1-5 INJECTION, SOLUTION INTRAVENOUS; SUBCUTANEOUS
Status: ON HOLD | COMMUNITY
End: 2020-10-29

## 2020-06-11 RX ORDER — ESTRADIOL AND NORETHINDRONE ACETATE .5; .1 MG/1; MG/1
1 TABLET ORAL DAILY
Status: DISCONTINUED | OUTPATIENT
Start: 2020-06-12 | End: 2020-06-13 | Stop reason: HOSPADM

## 2020-06-11 RX ORDER — KETOROLAC TROMETHAMINE 15 MG/ML
15 INJECTION, SOLUTION INTRAMUSCULAR; INTRAVENOUS ONCE
Status: COMPLETED | OUTPATIENT
Start: 2020-06-11 | End: 2020-06-11

## 2020-06-11 RX ORDER — ESCITALOPRAM OXALATE 5 MG/1
20 TABLET ORAL DAILY
Status: DISCONTINUED | OUTPATIENT
Start: 2020-06-12 | End: 2020-06-13 | Stop reason: HOSPADM

## 2020-06-11 RX ORDER — ESTRADIOL 0.1 MG/G
2 CREAM VAGINAL
Status: DISCONTINUED | OUTPATIENT
Start: 2020-06-11 | End: 2020-06-13 | Stop reason: HOSPADM

## 2020-06-11 RX ORDER — NICOTINE POLACRILEX 4 MG
15-30 LOZENGE BUCCAL
Status: DISCONTINUED | OUTPATIENT
Start: 2020-06-11 | End: 2020-06-13 | Stop reason: HOSPADM

## 2020-06-11 RX ORDER — DEXTROSE MONOHYDRATE 25 G/50ML
25-50 INJECTION, SOLUTION INTRAVENOUS
Status: DISCONTINUED | OUTPATIENT
Start: 2020-06-11 | End: 2020-06-13 | Stop reason: HOSPADM

## 2020-06-11 RX ORDER — ESCITALOPRAM OXALATE 20 MG/1
20 TABLET ORAL DAILY
COMMUNITY

## 2020-06-11 RX ORDER — DULOXETIN HYDROCHLORIDE 30 MG/1
30 CAPSULE, DELAYED RELEASE ORAL 2 TIMES DAILY
Status: DISCONTINUED | OUTPATIENT
Start: 2020-06-11 | End: 2020-06-13 | Stop reason: HOSPADM

## 2020-06-11 RX ORDER — GABAPENTIN 300 MG/1
300 CAPSULE ORAL AT BEDTIME
Status: DISCONTINUED | OUTPATIENT
Start: 2020-06-11 | End: 2020-06-13 | Stop reason: HOSPADM

## 2020-06-11 RX ORDER — NALOXONE HYDROCHLORIDE 0.4 MG/ML
.1-.4 INJECTION, SOLUTION INTRAMUSCULAR; INTRAVENOUS; SUBCUTANEOUS
Status: DISCONTINUED | OUTPATIENT
Start: 2020-06-11 | End: 2020-06-13 | Stop reason: HOSPADM

## 2020-06-11 RX ORDER — INSULIN ASPART 100 [IU]/ML
INJECTION, SOLUTION INTRAVENOUS; SUBCUTANEOUS
Status: ON HOLD | COMMUNITY
End: 2020-10-29

## 2020-06-11 RX ORDER — MODAFINIL 200 MG/1
400 TABLET ORAL DAILY
Status: DISCONTINUED | OUTPATIENT
Start: 2020-06-12 | End: 2020-06-13 | Stop reason: HOSPADM

## 2020-06-11 RX ORDER — OXYCODONE AND ACETAMINOPHEN 5; 325 MG/1; MG/1
1-2 TABLET ORAL EVERY 4 HOURS PRN
Status: DISCONTINUED | OUTPATIENT
Start: 2020-06-11 | End: 2020-06-11

## 2020-06-11 RX ORDER — LORATADINE 10 MG/1
10 TABLET ORAL DAILY PRN
Status: DISCONTINUED | OUTPATIENT
Start: 2020-06-11 | End: 2020-06-13 | Stop reason: HOSPADM

## 2020-06-11 RX ORDER — HYDROXYZINE HYDROCHLORIDE 25 MG/1
25-50 TABLET, FILM COATED ORAL 3 TIMES DAILY PRN
Status: DISCONTINUED | OUTPATIENT
Start: 2020-06-11 | End: 2020-06-13 | Stop reason: HOSPADM

## 2020-06-11 RX ORDER — CEFTRIAXONE 2 G/1
2 INJECTION, POWDER, FOR SOLUTION INTRAMUSCULAR; INTRAVENOUS ONCE
Status: COMPLETED | OUTPATIENT
Start: 2020-06-11 | End: 2020-06-11

## 2020-06-11 RX ADMIN — TRAZODONE HYDROCHLORIDE 50 MG: 50 TABLET ORAL at 21:21

## 2020-06-11 RX ADMIN — OMEPRAZOLE 40 MG: 20 CAPSULE, DELAYED RELEASE ORAL at 15:38

## 2020-06-11 RX ADMIN — ACETAMINOPHEN 650 MG: 325 TABLET, FILM COATED ORAL at 15:38

## 2020-06-11 RX ADMIN — POTASSIUM CHLORIDE AND SODIUM CHLORIDE: 900; 150 INJECTION, SOLUTION INTRAVENOUS at 11:56

## 2020-06-11 RX ADMIN — KETOROLAC TROMETHAMINE 15 MG: 15 INJECTION, SOLUTION INTRAMUSCULAR; INTRAVENOUS at 13:49

## 2020-06-11 RX ADMIN — INSULIN ASPART 2 UNITS: 100 INJECTION, SOLUTION INTRAVENOUS; SUBCUTANEOUS at 12:50

## 2020-06-11 RX ADMIN — GABAPENTIN 300 MG: 300 CAPSULE ORAL at 21:21

## 2020-06-11 RX ADMIN — OXYCODONE HYDROCHLORIDE 5 MG: 5 TABLET ORAL at 22:22

## 2020-06-11 RX ADMIN — KETOROLAC TROMETHAMINE 15 MG: 15 INJECTION, SOLUTION INTRAMUSCULAR; INTRAVENOUS at 06:54

## 2020-06-11 RX ADMIN — INSULIN ASPART 2 UNITS: 100 INJECTION, SOLUTION INTRAVENOUS; SUBCUTANEOUS at 17:29

## 2020-06-11 RX ADMIN — PANCRELIPASE 104400 UNITS: 10440; 39150; 39150 TABLET ORAL at 18:41

## 2020-06-11 RX ADMIN — INSULIN GLARGINE 6 UNITS: 100 INJECTION, SOLUTION SUBCUTANEOUS at 21:21

## 2020-06-11 RX ADMIN — POTASSIUM CHLORIDE 20 MEQ: 1500 TABLET, EXTENDED RELEASE ORAL at 17:00

## 2020-06-11 RX ADMIN — SODIUM CHLORIDE 1000 ML: 9 INJECTION, SOLUTION INTRAVENOUS at 06:54

## 2020-06-11 RX ADMIN — CEFTRIAXONE 2 G: 2 INJECTION, POWDER, FOR SOLUTION INTRAMUSCULAR; INTRAVENOUS at 09:15

## 2020-06-11 RX ADMIN — POTASSIUM CHLORIDE 40 MEQ: 1500 TABLET, EXTENDED RELEASE ORAL at 13:33

## 2020-06-11 RX ADMIN — ERYTHROMYCIN 0.5 G: 5 OINTMENT OPHTHALMIC at 20:06

## 2020-06-11 RX ADMIN — DULOXETINE HYDROCHLORIDE 30 MG: 30 CAPSULE, DELAYED RELEASE ORAL at 20:05

## 2020-06-11 ASSESSMENT — ENCOUNTER SYMPTOMS
FLANK PAIN: 0
DIARRHEA: 0
NAUSEA: 0
ABDOMINAL PAIN: 0
CHILLS: 1
DYSURIA: 1
COUGH: 0
FEVER: 1
FREQUENCY: 0
VOMITING: 0
SHORTNESS OF BREATH: 0

## 2020-06-11 ASSESSMENT — MIFFLIN-ST. JEOR: SCORE: 1244.05

## 2020-06-11 ASSESSMENT — ACTIVITIES OF DAILY LIVING (ADL)
ADLS_ACUITY_SCORE: 10

## 2020-06-11 NOTE — PHARMACY-ADMISSION MEDICATION HISTORY
Admission medication history interview status for this patient is complete. See [facility] admission navigator for allergy information, prior to admission medications and immunization status.     Medication history interview done via telephone during Covid-19 pandemic, indicate source(s): Patient  Medication history resources (including written lists, pill bottles, clinic record):None    Changes made to PTA medication list:  Added: none  Deleted: hydrocodone/APAP  Changed: viokace ( into 2 Rx entries), escitalopram (10 mg --> 20 mg tablets; once daily), lantus 5 units --> 6 units, ondansetron (TID --> TID PRN), novolog (sliding scale insulin and CHO count entries)    Actions taken by pharmacist (provider contacted, etc):None   Additional medication history information:estradiol/norethindrone; has 3 weeks of therapy remaining  Medication reconciliation/reorder completed by provider prior to medication history? no    For patients on insulin therapy: YES  Sliding scale Novolog:  YES  Do you have a baseline novolog pre-meal dose:   NO  Do you eat three meals a day:  YES  How many times do you check your blood glucose per day:  3x/day  How many episodes of hypoglycemia do you have per week: none  Do you have a Continuous glucose monitor (CGM) : no  Any specific barriers to therapy? No, confident with current diabetes regimen      Prior to Admission medications    Medication Sig Last Dose Taking? Auth Provider   acetaminophen (TYLENOL) 325 MG tablet Take 325 mg by mouth every 6 hours as needed for pain  Past Month at Unknown time Yes Unknown, Entered By History   amylase-lipase-protease (VIOKACE) 58047 units TABS tablet Take 5 capsules by mouth 3 times daily (with meals) Past Week at Unknown time Yes Unknown, Entered By History   amylase-lipase-protease (VIOKACE) 43328 units TABS tablet Take 2 capsules by mouth Take with snacks or supplements Past Week at Unknown time Yes Unknown, Entered By History   DULoxetine  (CYMBALTA) 30 MG capsule Take 1 capsule (30 mg) by mouth 2 times daily Fill at patient request. 6/10/2020 at x 2 Yes Maryana Brooks MD   erythromycin (ROMYCIN) 5 MG/GM ophthalmic ointment Apply 0.5 inches topically 3 times daily Apply thin ribbon over upper brow incisions 3x per day until follow up/suture removal 6/18/20. Past Week at Unknown time Yes Denise Alberts MD   escitalopram (LEXAPRO) 20 MG tablet Take 20 mg by mouth daily 6/10/2020 at am Yes Unknown, Entered By History   estradiol (ESTRACE) 0.1 MG/GM vaginal cream PLACE 2 GRAMS VAGINALLY TWICE A WEEK Past Week at Unknown time Yes Kavitha Spencer DO   Estradiol-Norethindrone Acet 0.5-0.1 MG TABS Take 1 tablet by mouth daily 6/10/2020 at am; has 3 weeks remaining Yes Kavitha Spencer DO   hydrOXYzine (ATARAX) 25 MG tablet TAKE 1-2 TABLETS BY MOUTH 2 TIMES DAILY AS NEEDED FOR ANXIETY Past Month at Unknown time Yes Reported, Patient   insulin aspart (NOVOLOG FLEXPEN) 100 UNIT/ML pen Inject Subcutaneous 3 times daily (with meals) 1 units per 15 grams of carbohydrate. Do not give if pre-prandial glucose is less than 60 mg/dL. Past Week at Unknown time Yes Unknown, Entered By History   insulin aspart (NOVOLOG FLEXPEN) 100 UNIT/ML pen Inject 1-5 Units Subcutaneous 3 times daily (with meals) For Pre-Meal  -214 give 1 unit.  For Pre-Meal  -289 give 2 unit  For Pre-Meal  -364 give 3 unit.  For Pre-Meal  - 439 give 4 unit.   For Pre-Meal BG greater than or equal to 440 give 5 units. Past Week at Unknown time Yes Unknown, Entered By History   insulin glargine (LANTUS PEN) 100 UNIT/ML pen Inject 6 Units Subcutaneous daily (with dinner) 6/10/2020 at pm Yes Unknown, Entered By History   levothyroxine (SYNTHROID/LEVOTHROID) 100 MCG tablet Take 1 tablet (100 mcg) by mouth daily 6/10/2020 at am Yes Ravinder Kendall MD   loratadine (CLARITIN) 10 MG tablet Take 10 mg by mouth daily as needed  Past Month at Unknown time Yes  Unknown, Entered By History   modafinil (PROVIGIL) 200 MG tablet Take 400 mg by mouth daily 6/10/2020 at am Yes Unknown, Entered By History   omeprazole (PRILOSEC) 40 MG DR capsule Take 1 capsule (40 mg) by mouth 2 times daily Take 30-60 minutes before a meal. 6/10/2020 at x 2 Yes Maryana Brooks MD   ondansetron (ZOFRAN-ODT) 4 MG ODT tab Take 4 mg by mouth every 8 hours as needed for nausea Past Month at Unknown time Yes Unknown, Entered By History   blood glucose (BILL MICROLET 2) lancing device Use to test blood sugars 6 times daily or as directed.   Adriana Robles MD   blood glucose monitoring (BLIL CONTOUR NEXT) test strip Check BS 4-6 times a day   Adriana Robles MD   famotidine (PEPCID) 40 MG tablet Take 1 tablet (40 mg) by mouth 2 times daily as needed for heartburn More than a month at Unknown time  Maryana Brooks MD   gabapentin (NEURONTIN) 300 MG capsule Take 1 capsule by mouth At Bedtime  6/9/2020 at hs  Reported, Patient   glucose 40 % GEL Take 15-30 g by mouth every 15 minutes as needed. More than a month at Unknown time  Suzi Short APRN CNP   insulin pen needle (BD MAHESH U/F) 32G X 4 MM Use up to 3 times daily as needed for insulin dosing   Adriana Robles MD   [employer] For insulin needles   Suzi Short APRN CNP   traZODone (DESYREL) 50 MG tablet Take 1 tablet (50 mg) by mouth At Bedtime 6/9/2020 at hs  Maryana Brooks MD   UNABLE TO FIND MEDICATION NAME: Dopatone Active More than a month at Unknown time  Reported, Patient   UNABLE TO FIND MEDICATION NAME: magnezyme More than a month at Unknown time  Reported, Patient   UNABLE TO FIND MEDICATION NAME: immunoG PRP More than a month at Unknown time  Reported, Patient   UNABLE TO FIND MEDICATION NAME: GI-Synergy More than a month at Unknown time  Reported, Patient

## 2020-06-11 NOTE — ED PROVIDER NOTES
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 The patient reports prior to arrival she developed a fever. She says her highest recorded fever was 101F, and took Tylenol for her symptoms. She reports associated headache, myalgias, arthralgias, chills.  She denies cough or dyspnea.  She reports a negative COVID swab just a few days prior.  She recently had a facial surgery, but has no concerns about infection.  She does report some mild dysuria that started yesterday but has already improved.  She denies flank pain.  She denies abdominal pain.    Allergies:  Corticosteroids  Povidone Iodine  Naproxen  Nsaids  Povidone Iodine  Sulfasalazine      Medications:    Biokace  Lexapro  Gabapentin  Atarax  Novolog   Lantus solostar  Levothyroxine    Prilosec  Provigil    Past Medical History:    Benign paroxysmal positional vertigo  Calculus of kidney  Chronic abdominal pain   Chronic pain syndrome  Chronic pancreatitis  Depression  Post pancreatectomy Diabetes  Dyspepsia  Headache  Hypertension  Iron deficiency anemia secondary to inadequate dietary iron intake  Other chronic pain  Pyelonephritis  Regional enteritis of unspecified site  Sleep apnea  Spasm of sphincter of Oddi  Thyroid nodule  Ureteral Calculus  Vaccination not carried out    Past Surgical History:    Appendectomy   R bunion procedure  T&A  R ovarian cyst procedure  C section  Gall bladder procedure  CBD stent  Gastric bypass  Cholecystectomy   Combined cystoscopy, retrogrades, ureteroscopy, laser holmium lithotripsy ureter, insert stent (x3)  Cosmetic surgery  ENT surgery  EGD  Small bowel obstruction  Gastric bypass  HC Lap, lysis of adhesions (x3)  Hernia repair  Pancreatectomy, transplant auto islet cell,  combined  Repair ptosis brow bilateral  Transplant  [facility]    Family History:    COPD  Kidney stones  Substance abuse  Depression  Asthma  Genitourinary problems    Social History:  Smoking Status: Never Smoker  Smokeless Tobacco: Never Used  Alcohol Use: No   Marital Status:   [2]       Review of Systems   Constitutional: Positive for chills and fever.   HENT: Negative for congestion.    Respiratory: Negative for cough and shortness of breath.    Cardiovascular: Negative for chest pain.   Gastrointestinal: Negative for abdominal pain, diarrhea, nausea and vomiting.   Genitourinary: Positive for dysuria. Negative for flank pain and frequency.   All other systems reviewed and are negative.      Physical Exam   First Vitals:  BP: 118/82  Pulse: 71  Heart Rate: 102  Temp: 99.9  F (37.7  C)  Resp: 18  Weight: 65.8 kg (145 lb)  SpO2: 95 %      Physical Exam  Nursing note and vitals reviewed.  Constitutional: Cooperative.   HENT:   Mouth/Throat: Moist mucous membranes.   Eyes: EOMI, nonicteric sclera  Cardiovascular: Normal rate, regular rhythm, no murmurs, rubs, or gallops  Pulmonary/Chest: Effort normal and breath sounds normal. No respiratory distress. No wheezes. No rales.   Abdominal: Soft. Nontender, nondistended, no guarding or rigidity.   Musculoskeletal: Normal range of motion.   Neurological: Alert. Moves all extremities spontaneously.   Skin: Skin is warm and dry. No rash noted.   Psychiatric: Normal mood and affect.       Emergency Department Course       Imaging:  Radiology findings were communicated with the patient who voiced understanding of the findings.    Chest X-Ray Port 1 View:   IMPRESSION: No consolidation. No pleural effusions. No pneumothorax.   Normal cardiac size.   reading per radiology.      Laboratory:  Laboratory findings were communicated with the patient who voiced understanding of the findings.    CBC: WBC 17.0 (H), HGB 11.7,    CMP: Potassium 3.3 (L), Glucose 123 (H), Calcium  8.3 (L), Albumin 3.0 (L), Protein Total 6.4 (L) o/w WNL. (Creatinine 0.90)   Lactic Acid: 0.7     UA: Light yellow and Slightly Cloudy. Urine Blood Trace, Protein Albumin Urine 10, Nitrite Positive, Leukocyte Esterase Urine large, WBC/HPF >182 (H), RBC/HPF 4 (H), WBC Clumps Present, Bacteria many, Squamous Epithelial/HPF Urine 3 (H), Mucous Urine present o/w WNL      Symptomatic Covid-19 Virus (Coronavirus) by PCR: Pending    Urine Culture Aerobic Bacterial: Pending   Blood cultures: Pending     Interventions:  0654 Toradol 15 mg IV  0915 Rocephin 2 g vial to attach NS  100 mL 2 g IV       Emergency Department Course:  Nursing notes and vitals reviewed.  0615: I performed an exam of the patient as documented above.    IV was inserted and blood was drawn for laboratory testing, results above.    The patient was sent for a Chest XR while in the emergency department, results above.        0745 Patient rechecked and updated.      0934 I spoke with  Ibis GUERRERO) with Dr. Ahumada of the Hospitalist service regarding patient's presentation, findings, and plan of care.       I personally reviewed the laboratory and imaging results with the Patient and answered all related questions prior to admission.     Impression & Plan      Covid-19  Lynn Thompson was evaluated during a global COVID-19 pandemic, which necessitated consideration that the patient might be at risk for infection with the SARS-CoV-2 virus that causes COVID-19.   Applicable protocols for evaluation were followed during the patient's care.   COVID-19 was considered as part of the patient's evaluation. The plan for testing is:  a test was obtained during this visit.      Medical Decision Making:  Patient presents with chief complaint of fever.  Also complains of myalgias/arthralgias, and had some dysuria yesterday.  Denies cough or shortness of breath.  Broad differential of course considered.  ED work-up is suggestive of urinary tract infection.   No signs of pyelonephritis at this time.  No abdominal or flank pain no suspect stone or abscess.  Patient does have significant leukocytosis.  She also has history of bacteremia.  Patient meets sepsis criteria with leukocytosis, fever, and tachycardia on arrival.  She was initiated on 2 g of Rocephin to cover her against possible bacteremia while we await culture results.  Discussed admission which she was in agreement with. Ibis Moore PA-C, from our hospitalist service accepts for admission. All questions answered. Pt admitted in stable condition.       Diagnosis:    ICD-10-CM    1. Acute cystitis without hematuria  N30.00 Blood culture     Blood culture     Urine Culture Aerobic Bacterial     Symptomatic COVID-19 Virus (Coronavirus) by PCR     SARS-CoV-2 COVID-19 Virus (Coronavirus) RT-PCR     SARS-CoV-2 COVID-19 Virus (Coronavirus) RT-PCR       Disposition:  Admitted under the supervision of Dr. Brandyn Sánchez Disclosure:  I, Bree Dodson, am serving as a scribe at 6:17 AM on 6/11/2020 to document services personally performed by Stephen Bear MD based on my observations and the provider's statements to me.    Bree Dodson  6/11/2020   [facility] EMERGENCY DEPARTMENT       Stephen Bear MD  06/11/20 5705

## 2020-06-11 NOTE — ED NOTES
[facility]  ED Nurse Handoff Report    Lynn Thompson is a 55 year old female   ED Chief complaint: Fever  . ED Diagnosis:   Final diagnoses:   Acute cystitis without hematuria     Allergies:   Allergies   Allergen Reactions     Corticosteroids Other (See Comments)     All oral, IV and injectable steroids are contraindicated (unless in life threatening situations) in Islet Auto transplant recipients. They can cause irreversible loss of islet cell function. Please contact patient's transplant care coordinator, Erlinda Multani RN BSN at [phone]/pager: [phone] and endocrinologist prior to administration.       Povidone Iodine Hives     Causes skin to blister     Naproxen      Other reaction(s): Abdominal Pain  Pt allergic to Naprosyn     Nsaids      naprosyn = GI upset     Povidone Iodine      blisters     Sulfasalazine Nausea and Nausea and Vomiting       Code Status: Full Code  Activity level - Baseline/Home:  Independent. Activity Level - Current:   Independent. Lift room needed: No. Bariatric: No   Needed: No   Isolation: No. Infection: Not Applicable.     Vital Signs:   Vitals:    06/11/20 0700 06/11/20 0730 06/11/20 0800 06/11/20 0830   BP: 114/61 109/57 103/57 114/53   Pulse: 71 71 73 75   Resp:       Temp:       TempSrc:       SpO2: 98% 96% 95% 96%   Weight:           Cardiac Rhythm:  ,      Pain level:    Patient confused: No. Patient Falls Risk: No.   Elimination Status: Has voided   Patient Report - Initial Complaint: Fever. Focused Assessment: A&O x4, ABCs intact. Pt presents with fever that started this morning, as well as body aches. Pt denies cough, SOB. Pt ambulates independently. Pt had recent eyebrow surgery last week. Pt denies urinary symptoms, but UA shows UTI. Pt lactic acid WNL.   Tests Performed: labs, imaging. Abnormal Results:   Labs Ordered and Resulted from Time of ED Arrival Up to the Time of Departure from the ED   CBC WITH PLATELETS DIFFERENTIAL -  Abnormal; Notable for the following components:       Result Value    WBC 17.0 (*)     Absolute Neutrophil 13.8 (*)     Absolute Monocytes 1.7 (*)     All other components within normal limits   COMPREHENSIVE METABOLIC PANEL - Abnormal; Notable for the following components:    Potassium 3.3 (*)     Glucose 123 (*)     Calcium 8.3 (*)     Albumin 3.0 (*)     Protein Total 6.4 (*)     All other components within normal limits   ROUTINE UA WITH MICROSCOPIC REFLEX TO CULTURE - Abnormal; Notable for the following components:    Blood Urine Trace (*)     Protein Albumin Urine 10 (*)     Nitrite Urine Positive (*)     Leukocyte Esterase Urine Large (*)     WBC Urine >182 (*)     RBC Urine 4 (*)     WBC Clumps Present (*)     Bacteria Urine Many (*)     Squamous Epithelial /HPF Urine 3 (*)     Mucous Urine Present (*)     All other components within normal limits   LACTIC ACID WHOLE BLOOD   COVID-19 VIRUS (CORONAVIRUS) BY PCR   PERIPHERAL IV CATHETER   BLOOD CULTURE   BLOOD CULTURE   URINE CULTURE AEROBIC BACTERIAL     XR Chest Port 1 View   Final Result   IMPRESSION: No consolidation. No pleural effusions. No pneumothorax.   Normal cardiac size.      ADAMA VARGAS MD          Treatments provided: See MAR  Family Comments: None  OBS brochure/video discussed/provided to patient:  N/A  ED Medications:   Medications   ketorolac (TORADOL) injection 15 mg (15 mg Intravenous Given 6/11/20 0654)   0.9% sodium chloride BOLUS (0 mLs Intravenous Stopped 6/11/20 0849)   cefTRIAXone (ROCEPHIN) 2 g vial to attach to  ml bag for ADULTS or NS 50 ml bag for PEDS (2 g Intravenous New Bag 6/11/20 0915)     Drips infusing:  No  For the majority of the shift, the patient's behavior Green. Interventions performed were None.    Sepsis treatment initiated: No       ED Nurse Name/Phone Number: Herman Justice RN,   9:59 AM    RECEIVING UNIT ED HANDOFF REVIEW    Above ED Nurse Handoff Report was reviewed: Yes  Reviewed by: Erin Hernandez  RN on June 11, 2020 at 10:27 AM

## 2020-06-11 NOTE — ED TRIAGE NOTES
Pt presents with fever since this morning. Pt states it was up to 101 at home and took tylenol PTA

## 2020-06-11 NOTE — ED NOTES
Pt unable to provide urine sample at this time. Pt encouraged to try and give sample as soon as possible.

## 2020-06-11 NOTE — ED NOTES
Pt sleeping when I walked into the room. Pt woke up by verbal stimuli and is going to give urine sample at this time. Pt instructed to press call light when she is done and someone will come collect the sample. Commode at bedside with hat in commode.

## 2020-06-11 NOTE — PROGRESS NOTES
Pt transferring to general population due to COVID negative status  A/ox4  No pain/nausea reported  ON IV rocephin   O2 RA  Chills reported, tmax 99.6, IV toradol given   K 3.3, replacement started  Facial incision clean dry and intact  Up independently   Mod cho diet  Discharge pending  Will continue to monitor

## 2020-06-11 NOTE — H&P
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      Active Hospital Problems    #Sepsis secondary to UTI  Dysuria preceding fever of 101F at home with bacteruria, + nitrites and large LE on UA.  Febrile with hypotension and neutrophilic leukocytosis.  No KATLYN, lactic acidosis or systemic hypoperfusion. No abdominal/flank pain to suspect renal colic at this time though does have history of renal calculi.   History of E. coli bacteremia (2017) secondary to UTI.  Previously has cultured pansensitive E. Coli.  Received 2 g ceftriaxone in the ED with 1 L normal saline.  -Supportive cares with Tylenol, fluids  -We will follow blood cultures, final urine cultures  -Continue Rocephin for now  -IV fluids  -low threshold for CT AB/Pelvis if decompensation, development of abdominal or flank pain    #COVID PUI  Due to fever was tested for COVID-19 viral infection via PCR.  No cough or respiratory symptoms.  Chest x-ray negative.  No history of known ill contacts.  Previously has had negative COVID PCR testing.  Low clinical suspicion for infection.  -Discontinue droplet, contact isolation's once PCR returns negative    Chronic medical conditions    #BLU  -Resume nightly CPAP    #THERESA  Hemoglobin 11.7 on admission.  She has previously been treated with IV iron infusions dependent on her hematocrit level, last recieved 3 to 4 years ago.  -Repeat hemoglobin with a.m. labs, expect some component of delusional  drop    #Exocrine pancreatic insufficiency s/p pancreatic auto islet cell transplant 2013   #Post pancreatectomy Diabetes Mellitus  A1C 7.1.  Is not on immunosuppressive therapy  -POCT glucose checks  -Resume long-acting nightly lantus 6 U  -Sliding scale insulin as needed    #Hypothyroidism  Resume PTA synthroid.     #GERD  - resume prilosec    Awaiting formal pharmacy reconciliation to resume home medications.     DVT Prophylaxis: Pneumatic Compression Devices  Code Status: Full Code  Expected discharge: 24-48 hours, recommended to prior living arrangement once pending antibiotic plan, blood culture results.     Ibis Moore PA-C    Primary Care Physician   *Maryana Brooks    Chief Complaint   fever    History is obtained from the patient    Discussed with Dr. Bear in the ED, full chart review including lab work, imaging, and vital signs were reviewed. Patient received 2 g IV Rocephin, 1 L normal saline bolus in the ED.     History of Present Illness   Lynn Thompson is a 55 year old female with history of pancreatic auto islet cell transplant secondary to recurrent pancreatitis complicated by development of DM, Crohn's disease status post ileal resection, who presents with dysuria, fever.    The patient had recent surgical procedure, bilateral brow ptosis repair on 6/9.  Over the past two days has been resting, sleeping more. Reports lower PO intake. Has required minimal narcotic (2 doses of Vicodin at home) for pain control.    On interview the patient states that this morning she developed a fever of 101F.  Last night she started having burning with urination.  She denies visualized blood in her urine. No flank or abdominal pain. No nausea, vomiting.    She states that she has chronic intermittent back pain since her pancreatic transplant.  She is additionally had chills and weakness.    Patient presented with a temperature of 99.9, respiratory rate 18, oxygen saturation 96% on room air.  Blood  pressure 114/53 with a pulse of 102.  Laboratory studies revealed a potassium of 3.3, lactic acid 0.7.  Glucose 123.  CBC with a neutrophilic leukocytosis of 17.0.  UA with greater than 182 WBCs, many bacteria, positive nitrates and large LE.  Chest x-ray negative.    Past Medical History    I have reviewed this patient's medical history and updated it with pertinent information if needed.   Past Medical History:   Diagnosis Date     Abdominal pain, epigastric 11/05, ongoing; goes to pain clinic    probable recurrent spasm sphincter of Oddi causing biliary colic pains      Benign paroxysmal positional vertigo     occ.      Calculus of kidney 5/05    x1 on L side passed, several stones.  Has been tested for oxalate.     Chronic abdominal pain 7/17/2013     Chronic pancreatitis (H) 7/17/2013     Depressive disorder, not elsewhere classified     also occ panic spells     Diabetes (H)     post pancreatectomy     Dyspepsia and other specified disorders of function of stomach 6/99    H. 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  Past Surgical History:   Procedure Laterality Date     APPENDECTOMY  1990     C  NONSPECIFIC PROCEDURE      R bunion     C NONSPECIFIC PROCEDURE      T & A     C NONSPECIFIC PROCEDURE      partial ileum resection     C NONSPECIFIC PROCEDURE      R ovarian cyst     C NONSPECIFIC PROCEDURE           C NONSPECIFIC PROCEDURE      GALL BLADDER     C NONSPECIFIC PROCEDURE      CBD stent; Dr. Fernandez MAY NONSPECIFIC PROCEDURE   &     colonoscopy     C NONSPECIFIC PROCEDURE      Gastric bypass     CHOLECYSTECTOMY       COLONOSCOPY       COMBINED CYSTOSCOPY, RETROGRADES, URETEROSCOPY, LASER HOLMIUM LITHOTRIPSY URETER(S), INSERT STENT Right 3/23/2015    Procedure: COMBINED CYSTOSCOPY, RETROGRADES, URETEROSCOPY, LASER HOLMIUM LITHOTRIPSY URETER(S), INSERT STENT;  Surgeon: Kennedi Aldana MD;  Location: [facility]     COMBINED CYSTOSCOPY, RETROGRADES, URETEROSCOPY, LASER HOLMIUM LITHOTRIPSY URETER(S), INSERT STENT Right 2015    Procedure: COMBINED CYSTOSCOPY, RETROGRADES, URETEROSCOPY, LASER HOLMIUM LITHOTRIPSY URETER(S), INSERT STENT;  Surgeon: Kennedi Aldana MD;  Location: [facility] OR     COSMETIC SURGERY  2002    Tum sherrieck     CYSTOSCOPY, RETROGRADES, INSERT STENT URETER(S), COMBINED  10/2/2012    Procedure: COMBINED CYSTOSCOPY, RETROGRADES, INSERT STENT URETER(S);  COMBINED CYSTOSCOPY,  , INSERT LEFT STENT URETER;  Surgeon: Johny Baez MD;  Location: [facility] OR     ENT SURGERY       ESOPHAGOSCOPY, GASTROSCOPY, DUODENOSCOPY (EGD), COMBINED N/A 2018    Procedure: COMBINED ESOPHAGOSCOPY, GASTROSCOPY, DUODENOSCOPY (EGD);  ESOPHAGOSCOPY, GASTROSCOPY, DUODENOSCOPY (EGD)    ;  Surgeon: Tamir Rodgers MD;  Location:  GI     EXTRACORPOREAL SHOCK WAVE LITHOTRIPSY (ESWL)  10/16/2012    Procedure: EXTRACORPOREAL SHOCK WAVE LITHOTRIPSY (ESWL);  left EXTRACORPOREAL SHOCK WAVE LITHOTRIPSY (ESWL) ;  Surgeon: Johny Baez MD;  Location: [facility] OR     GI SURGERY  2015    Small bowel obstruction     GI SURGERY      gastric  bypass     HC LAP,LYSIS OF ADHESIONS       HERNIA REPAIR  2015     LAPAROSCOPIC LYSIS ADHESIONS N/A 2015    Procedure: LAPAROSCOPIC LYSIS ADHESIONS;  Surgeon: Aaron Early MD;  [facility]     LAPAROSCOPIC LYSIS ADHESIONS N/A 2015    Procedure: LAPAROSCOPIC LYSIS ADHESIONS;  Surgeon: Aaron Early MD;  [facility]: [facility]     PANCREATECTOMY, TRANSPLANT AUTO ISLET CELL, COMBINED  5/10/2013    Procedure: COMBINED PANCREATECTOMY, TRANSPLANT AUTO ISLET CELL;  Pancreatectomy, Auto Islet Cell Transplant   hernia repair, jejunostomy tube and liver biopsies with Anesthesia General with block;  Surgeon: Aaron Early MD;  [facility]: [facility]     REPAIR PTOSIS BROW BILATERAL Bilateral 2020    Procedure: BILATERAL BROW PTOSIS REPAIR;  Surgeon: Denise Alberts MD;  Location: [facility] OR     TRANSPLANT  5/10/13       Prior to Admission Medications   Prior to Admission Medications   Prescriptions Last Dose Informant Patient Reported? Taking?   ACETAMINOPHEN PO   Yes No   Sig: Take 325 mg by mouth every 6 hours as needed for pain   DULoxetine (CYMBALTA) 30 MG capsule   No No   Sig: Take 1 capsule (30 mg) by mouth 2 times daily Fill at patient request.   Estradiol-Norethindrone Acet 0.5-0.1 MG TABS   No No   Sig: Take 1 tablet by mouth daily   HYDROcodone-acetaminophen (NORCO) 5-325 MG tablet   No No   Sig: Take 1 tablet by mouth every 6 hours as needed for severe pain   Injection Device for insulin (NOVOPEN ECHO) MODESTA   No No   Sig: Use to inject insulin subcutaneously four times daily   Injection Device for insulin (NOVOPEN JR, GREEN,) MODESTA  Self Yes No   Si Device Inject 1 unit (which is marked as a 1/2 unit on the pen itself)  as needed when eating carb heavy meals.   Sharps Container MISC  Self No No   Sig: For insulin needles   UNABLE TO FIND   Yes No   Sig: MEDICATION NAME: Dopatone Active   UNABLE TO FIND   Yes No   Sig: MEDICATION NAME: magnezyme   UNABLE TO FIND   Yes No   Sig: MEDICATION NAME:  immunoG PRP   UNABLE TO FIND   Yes No   Sig: MEDICATION NAME: GI-Synergy   amylase-lipase-protease (VIOKACE) 06568 units TABS tablet   No No   Sig: Take 4-5 caps with meals and 1-2 with snacks   blood glucose (BILL MICROLET 2) lancing device   No No   Sig: Use to test blood sugars 6 times daily or as directed.   blood glucose monitoring (BILL CONTOUR NEXT) test strip  Self No No   Sig: Check BS 4-6 times a day   erythromycin (ROMYCIN) 5 MG/GM ophthalmic ointment   No No   Sig: Apply 0.5 inches topically 3 times daily Apply thin ribbon over upper brow incisions 3x per day until follow up/suture removal 20.   escitalopram (LEXAPRO) 10 MG tablet   Yes No   Si mg    estradiol (ESTRACE) 0.1 MG/GM vaginal cream   No No   Sig: PLACE 2 GRAMS VAGINALLY TWICE A WEEK   famotidine (PEPCID) 40 MG tablet   No No   Sig: Take 1 tablet (40 mg) by mouth 2 times daily as needed for heartburn   gabapentin (NEURONTIN) 300 MG capsule   Yes No   Sig: Take 1 capsule by mouth At Bedtime    glucose 40 % GEL  Self No No   Sig: Take 15-30 g by mouth every 15 minutes as needed.   hydrOXYzine (ATARAX) 25 MG tablet   Yes No   Sig: TAKE 1-2 TABLETS BY MOUTH 2 TIMES DAILY AS NEEDED FOR ANXIETY   insulin aspart (NOVOLOG PENFILL) 100 UNITS/ML vial   No No   Sig: Inject 0.5-2 units 4 times daily   insulin glargine (LANTUS SOLOSTAR) 100 UNIT/ML pen   No No   Sig: Inject 5 Units Subcutaneous every evening   insulin pen needle (BD MAHESH U/F) 32G X 4 MM   No No   Sig: Use up to 3 times daily as needed for insulin dosing   levothyroxine (SYNTHROID/LEVOTHROID) 100 MCG tablet   No No   Sig: Take 1 tablet (100 mcg) by mouth daily   loratadine (CLARITIN) 10 MG tablet  Self Yes No   Sig: Take 10 mg by mouth daily as needed    modafinil (PROVIGIL) 200 MG tablet  Self Yes No   Sig: Take 2 tablets by mouth Once a Day   omeprazole (PRILOSEC) 40 MG DR capsule   No No   Sig: Take 1 capsule (40 mg) by mouth 2 times daily Take 30-60 minutes before a meal.    ondansetron (ZOFRAN ODT) 4 MG ODT tab   No No   Sig: Take 1-2 tablets (4-8 mg) by mouth 3 times daily (before meals)   traZODone (DESYREL) 50 MG tablet   No No   Sig: Take 1 tablet (50 mg) by mouth At Bedtime      Facility-Administered Medications: None     Allergies   Allergies   Allergen Reactions     Corticosteroids Other (See Comments)     All oral, IV and injectable steroids are contraindicated (unless in life threatening situations) in Islet Auto transplant recipients. They can cause irreversible loss of islet cell function. Please contact patient's transplant care coordinator, Erlinda Multani RN BSN at [phone]/pager: [phone] and endocrinologist prior to administration.       Povidone Iodine Hives     Causes skin to blister     Naproxen      Other reaction(s): Abdominal Pain  Pt allergic to Naprosyn     Nsaids      naprosyn = GI upset     Povidone Iodine      blisters     Sulfasalazine Nausea and Nausea and Vomiting       Social History   I have reviewed this patient's social history and updated it with pertinent information if needed. Lynn HENNING Jay  reports that she has never smoked. She has never used smokeless tobacco. She reports that she does not drink alcohol or use drugs.    Family History   I have reviewed this patient's family history and updated it with pertinent information if needed.   Family History   Problem Relation Age of Onset     Family History Negative Mother      Respiratory Father         COPD;  at 69     Genitourinary Problems Father         kidney stones     Substance Abuse Father      Depression Father      Asthma Father      Heart Disease Paternal Grandfather         M.I.     Coronary Artery Disease Paternal Grandfather      Hyperlipidemia Paternal Grandfather      Genitourinary Problems Brother         multiple brothers with kidney stones     Gastrointestinal Disease Maternal Grandmother         undiagnosed 'gut' issues     Coronary Artery Disease Maternal  Grandfather      Hyperlipidemia Maternal Grandfather      Cerebrovascular Disease Paternal Grandmother         At the age of 103     Anxiety Disorder Paternal Grandmother      Osteoporosis Paternal Grandmother      Anxiety Disorder Son      Anxiety Disorder Daughter      Asthma Daughter        Review of Systems   The 10 point Review of Systems is negative other than noted in the HPI or here.     Physical Exam   Temp: 99.9  F (37.7  C) Temp src: Oral BP: 114/53 Pulse: 75 Heart Rate: 102 Resp: 18 SpO2: 96 %      Vital Signs with Ranges  Temp:  [99.9  F (37.7  C)] 99.9  F (37.7  C)  Pulse:  [71-75] 75  Heart Rate:  [102] 102  Resp:  [18] 18  BP: (103-118)/(53-82) 114/53  SpO2:  [95 %-98 %] 96 %  145 lbs 0 oz    Will defer to the emergency department provider documentation and discussion for formal physical exam.  Admission performed via video telemedicine to preserve use of PPE during COVID-19 pandemic.    Constitutional: Awake, alert,  no apparent distress.  Neurologic:  Speech is clear.  Psychiatric: Appropriate affect.    Data   Data reviewed today:      EKG: None  Imaging:   Recent Results (from the past 24 hour(s))   XR Chest Port 1 View    Narrative    XR CHEST PORT 1 VW 6/11/2020 6:59 AM    HISTORY: fever    COMPARISON: 3/8/2019      Impression    IMPRESSION: No consolidation. No pleural effusions. No pneumothorax.  Normal cardiac size.    ADAMA VARGAS MD       Recent Labs   Lab 06/11/20  0646 06/05/20  1055   WBC 17.0* 8.3   HGB 11.7 12.5   MCV 94 93    354    138   POTASSIUM 3.3* 3.6   CHLORIDE 106 107   CO2 27 27   BUN 16 14   CR 0.90 0.89   ANIONGAP 6 4   VALARIE 8.3* 8.9   * 126*   ALBUMIN 3.0* 3.4   PROTTOTAL 6.4* 7.2   BILITOTAL 0.4 0.3   ALKPHOS 107 108   ALT 26 32   AST 19 25       Ibis Moore PA-C on 6/11/2020 at 9:34 AM

## 2020-06-12 LAB
ANION GAP SERPL CALCULATED.3IONS-SCNC: 4 MMOL/L (ref 3–14)
BACTERIA SPEC CULT: ABNORMAL
BASOPHILS # BLD AUTO: 0.1 10E9/L (ref 0–0.2)
BASOPHILS NFR BLD AUTO: 0.3 %
BUN SERPL-MCNC: 17 MG/DL (ref 7–30)
CALCIUM SERPL-MCNC: 8.3 MG/DL (ref 8.5–10.1)
CHLORIDE SERPL-SCNC: 109 MMOL/L (ref 94–109)
CO2 SERPL-SCNC: 26 MMOL/L (ref 20–32)
CREAT SERPL-MCNC: 0.93 MG/DL (ref 0.52–1.04)
DIFFERENTIAL METHOD BLD: ABNORMAL
EOSINOPHIL # BLD AUTO: 0.3 10E9/L (ref 0–0.7)
EOSINOPHIL NFR BLD AUTO: 1.5 %
ERYTHROCYTE [DISTWIDTH] IN BLOOD BY AUTOMATED COUNT: 14.5 % (ref 10–15)
GFR SERPL CREATININE-BSD FRML MDRD: 68 ML/MIN/{1.73_M2}
GLUCOSE BLDC GLUCOMTR-MCNC: 111 MG/DL (ref 70–99)
GLUCOSE BLDC GLUCOMTR-MCNC: 116 MG/DL (ref 70–99)
GLUCOSE BLDC GLUCOMTR-MCNC: 146 MG/DL (ref 70–99)
GLUCOSE BLDC GLUCOMTR-MCNC: 149 MG/DL (ref 70–99)
GLUCOSE BLDC GLUCOMTR-MCNC: 291 MG/DL (ref 70–99)
GLUCOSE BLDC GLUCOMTR-MCNC: 66 MG/DL (ref 70–99)
GLUCOSE SERPL-MCNC: 69 MG/DL (ref 70–99)
HCT VFR BLD AUTO: 36.6 % (ref 35–47)
HGB BLD-MCNC: 11.4 G/DL (ref 11.7–15.7)
IMM GRANULOCYTES # BLD: 0.1 10E9/L (ref 0–0.4)
IMM GRANULOCYTES NFR BLD: 0.5 %
LYMPHOCYTES # BLD AUTO: 2.2 10E9/L (ref 0.8–5.3)
LYMPHOCYTES NFR BLD AUTO: 12.7 %
Lab: ABNORMAL
MCH RBC QN AUTO: 29.9 PG (ref 26.5–33)
MCHC RBC AUTO-ENTMCNC: 31.1 G/DL (ref 31.5–36.5)
MCV RBC AUTO: 96 FL (ref 78–100)
MONOCYTES # BLD AUTO: 1.9 10E9/L (ref 0–1.3)
MONOCYTES NFR BLD AUTO: 10.9 %
NEUTROPHILS # BLD AUTO: 12.7 10E9/L (ref 1.6–8.3)
NEUTROPHILS NFR BLD AUTO: 74.1 %
NRBC # BLD AUTO: 0 10*3/UL
NRBC BLD AUTO-RTO: 0 /100
PLATELET # BLD AUTO: 282 10E9/L (ref 150–450)
POTASSIUM SERPL-SCNC: 4.2 MMOL/L (ref 3.4–5.3)
RBC # BLD AUTO: 3.81 10E12/L (ref 3.8–5.2)
SODIUM SERPL-SCNC: 139 MMOL/L (ref 133–144)
SPECIMEN SOURCE: ABNORMAL
WBC # BLD AUTO: 17.2 10E9/L (ref 4–11)

## 2020-06-12 PROCEDURE — 25000128 H RX IP 250 OP 636: Performed by: PHYSICIAN ASSISTANT

## 2020-06-12 PROCEDURE — 25800030 ZZH RX IP 258 OP 636: Performed by: INTERNAL MEDICINE

## 2020-06-12 PROCEDURE — 85025 COMPLETE CBC W/AUTO DIFF WBC: CPT | Performed by: PHYSICIAN ASSISTANT

## 2020-06-12 PROCEDURE — 36415 COLL VENOUS BLD VENIPUNCTURE: CPT | Performed by: PHYSICIAN ASSISTANT

## 2020-06-12 PROCEDURE — 25000128 H RX IP 250 OP 636: Performed by: INTERNAL MEDICINE

## 2020-06-12 PROCEDURE — 25000125 ZZHC RX 250: Performed by: INTERNAL MEDICINE

## 2020-06-12 PROCEDURE — 00000146 ZZHCL STATISTIC GLUCOSE BY METER IP

## 2020-06-12 PROCEDURE — 25000132 ZZH RX MED GY IP 250 OP 250 PS 637: Performed by: INTERNAL MEDICINE

## 2020-06-12 PROCEDURE — 80048 BASIC METABOLIC PNL TOTAL CA: CPT | Performed by: PHYSICIAN ASSISTANT

## 2020-06-12 PROCEDURE — 12000000 ZZH R&B MED SURG/OB

## 2020-06-12 PROCEDURE — 25000131 ZZH RX MED GY IP 250 OP 636 PS 637: Performed by: INTERNAL MEDICINE

## 2020-06-12 PROCEDURE — 99232 SBSQ HOSP IP/OBS MODERATE 35: CPT | Performed by: INTERNAL MEDICINE

## 2020-06-12 PROCEDURE — 25000132 ZZH RX MED GY IP 250 OP 250 PS 637: Performed by: PHYSICIAN ASSISTANT

## 2020-06-12 RX ORDER — SODIUM CHLORIDE 9 MG/ML
INJECTION, SOLUTION INTRAVENOUS CONTINUOUS
Status: DISCONTINUED | OUTPATIENT
Start: 2020-06-12 | End: 2020-06-13 | Stop reason: HOSPADM

## 2020-06-12 RX ADMIN — INSULIN GLARGINE 4 UNITS: 100 INJECTION, SOLUTION SUBCUTANEOUS at 21:54

## 2020-06-12 RX ADMIN — ERYTHROMYCIN 0.5 G: 5 OINTMENT OPHTHALMIC at 08:45

## 2020-06-12 RX ADMIN — INSULIN ASPART 2 UNITS: 100 INJECTION, SOLUTION INTRAVENOUS; SUBCUTANEOUS at 19:17

## 2020-06-12 RX ADMIN — DULOXETINE HYDROCHLORIDE 30 MG: 30 CAPSULE, DELAYED RELEASE ORAL at 08:39

## 2020-06-12 RX ADMIN — DULOXETINE HYDROCHLORIDE 30 MG: 30 CAPSULE, DELAYED RELEASE ORAL at 20:42

## 2020-06-12 RX ADMIN — OXYCODONE HYDROCHLORIDE 5 MG: 5 TABLET ORAL at 13:46

## 2020-06-12 RX ADMIN — OMEPRAZOLE 40 MG: 20 CAPSULE, DELAYED RELEASE ORAL at 16:24

## 2020-06-12 RX ADMIN — OXYCODONE HYDROCHLORIDE 5 MG: 5 TABLET ORAL at 09:09

## 2020-06-12 RX ADMIN — ENOXAPARIN SODIUM 40 MG: 40 INJECTION SUBCUTANEOUS at 12:28

## 2020-06-12 RX ADMIN — INSULIN ASPART 2 UNITS: 100 INJECTION, SOLUTION INTRAVENOUS; SUBCUTANEOUS at 12:39

## 2020-06-12 RX ADMIN — TRAZODONE HYDROCHLORIDE 50 MG: 50 TABLET ORAL at 21:54

## 2020-06-12 RX ADMIN — PANCRELIPASE 104400 UNITS: 20880; 78300; 78300 TABLET ORAL at 18:14

## 2020-06-12 RX ADMIN — LEVOTHYROXINE SODIUM 100 MCG: 0.1 TABLET ORAL at 08:38

## 2020-06-12 RX ADMIN — ACETAMINOPHEN 650 MG: 325 TABLET, FILM COATED ORAL at 20:42

## 2020-06-12 RX ADMIN — OMEPRAZOLE 40 MG: 20 CAPSULE, DELAYED RELEASE ORAL at 08:44

## 2020-06-12 RX ADMIN — ESTRADIOL 2 G: 0.1 CREAM VAGINAL at 21:54

## 2020-06-12 RX ADMIN — GABAPENTIN 300 MG: 300 CAPSULE ORAL at 21:54

## 2020-06-12 RX ADMIN — PANCRELIPASE 104400 UNITS: 10440; 39150; 39150 TABLET ORAL at 07:29

## 2020-06-12 RX ADMIN — PANCRELIPASE 104400 UNITS: 20880; 78300; 78300 TABLET ORAL at 12:36

## 2020-06-12 RX ADMIN — ACETAMINOPHEN 650 MG: 325 TABLET, FILM COATED ORAL at 16:24

## 2020-06-12 RX ADMIN — INSULIN ASPART 2 UNITS: 100 INJECTION, SOLUTION INTRAVENOUS; SUBCUTANEOUS at 08:32

## 2020-06-12 RX ADMIN — OXYCODONE HYDROCHLORIDE 5 MG: 5 TABLET ORAL at 05:03

## 2020-06-12 RX ADMIN — ESCITALOPRAM 20 MG: 5 TABLET, FILM COATED ORAL at 08:39

## 2020-06-12 RX ADMIN — OXYCODONE HYDROCHLORIDE 5 MG: 5 TABLET ORAL at 20:42

## 2020-06-12 RX ADMIN — ACETAMINOPHEN 650 MG: 325 TABLET, FILM COATED ORAL at 05:03

## 2020-06-12 RX ADMIN — ERYTHROMYCIN 0.5 G: 5 OINTMENT OPHTHALMIC at 16:24

## 2020-06-12 RX ADMIN — CEFTRIAXONE 2 G: 2 INJECTION, POWDER, FOR SOLUTION INTRAMUSCULAR; INTRAVENOUS at 08:33

## 2020-06-12 RX ADMIN — ERYTHROMYCIN 0.5 G: 5 OINTMENT OPHTHALMIC at 21:56

## 2020-06-12 RX ADMIN — MODAFINIL 400 MG: 200 TABLET ORAL at 08:39

## 2020-06-12 RX ADMIN — SODIUM CHLORIDE, PRESERVATIVE FREE: 5 INJECTION INTRAVENOUS at 13:26

## 2020-06-12 ASSESSMENT — ACTIVITIES OF DAILY LIVING (ADL)
ADLS_ACUITY_SCORE: 10

## 2020-06-12 NOTE — PLAN OF CARE
End of Shift Summary  For vital signs and complete assessments, please see documentation flowsheets.     Pertinent assessments: A&Ox4. Up ind. Tmax 100.5. Oxy given x2 for head pain. Incisions CDI.  & 111.   Major Shift Events: IVF discontinued. Uneventful night.   Treatment Plan: IV rocephin    Discharge Readiness: Medically active  Expected Discharge Date: TBD  Discharge Disposition: Home with Self care  Barriers/Criteria for discharge: afebrile

## 2020-06-12 NOTE — PLAN OF CARE
[id number]    To Do:  End of Shift Summary  For vital signs and complete assessments, please see documentation flowsheets.     Pertinent assessments: VSS, tmax 100.1 tylenol given. Ambulates independently. Incision to eyebrows clean,dry, intact.  Major Shift Events: oxycodone for flank pain  Treatment Plan: IV rocephin, IVF    Discharge Readiness: Medically active  Expected Discharge Date: TBD  Discharge Disposition: Home with Self care  Barriers/Criteria for discharge: afebrile

## 2020-06-12 NOTE — PLAN OF CARE
End of Shift Summary  For vital signs and complete assessments, please see documentation flowsheets.     Pertinent assessments: Tmax 102.3 decreased with PRN tylenol. A&Ox4, up independently in room   Major Shift Events: blood glucose monitored and covered with insulin appropriately  Treatment Plan: IV rocephin, IVF    Discharge Readiness: Medically active  Expected Discharge Date: TBD  Discharge Disposition: Home with Self care  Barriers/Criteria for discharge: afebrile

## 2020-06-13 VITALS
SYSTOLIC BLOOD PRESSURE: 123 MMHG | HEIGHT: 64 IN | HEART RATE: 65 BPM | TEMPERATURE: 98.3 F | RESPIRATION RATE: 16 BRPM | WEIGHT: 148.6 LBS | BODY MASS INDEX: 25.37 KG/M2 | OXYGEN SATURATION: 96 % | DIASTOLIC BLOOD PRESSURE: 54 MMHG

## 2020-06-13 LAB
ANION GAP SERPL CALCULATED.3IONS-SCNC: 3 MMOL/L (ref 3–14)
BUN SERPL-MCNC: 14 MG/DL (ref 7–30)
CALCIUM SERPL-MCNC: 8.2 MG/DL (ref 8.5–10.1)
CHLORIDE SERPL-SCNC: 111 MMOL/L (ref 94–109)
CO2 SERPL-SCNC: 26 MMOL/L (ref 20–32)
CREAT SERPL-MCNC: 0.87 MG/DL (ref 0.52–1.04)
ERYTHROCYTE [DISTWIDTH] IN BLOOD BY AUTOMATED COUNT: 14.4 % (ref 10–15)
GFR SERPL CREATININE-BSD FRML MDRD: 74 ML/MIN/{1.73_M2}
GLUCOSE BLDC GLUCOMTR-MCNC: 127 MG/DL (ref 70–99)
GLUCOSE BLDC GLUCOMTR-MCNC: 80 MG/DL (ref 70–99)
GLUCOSE SERPL-MCNC: 87 MG/DL (ref 70–99)
HCT VFR BLD AUTO: 31.7 % (ref 35–47)
HGB BLD-MCNC: 9.9 G/DL (ref 11.7–15.7)
MCH RBC QN AUTO: 29.8 PG (ref 26.5–33)
MCHC RBC AUTO-ENTMCNC: 31.2 G/DL (ref 31.5–36.5)
MCV RBC AUTO: 96 FL (ref 78–100)
PLATELET # BLD AUTO: 276 10E9/L (ref 150–450)
POTASSIUM SERPL-SCNC: 3.8 MMOL/L (ref 3.4–5.3)
RBC # BLD AUTO: 3.32 10E12/L (ref 3.8–5.2)
SODIUM SERPL-SCNC: 140 MMOL/L (ref 133–144)
WBC # BLD AUTO: 11 10E9/L (ref 4–11)

## 2020-06-13 PROCEDURE — 85027 COMPLETE CBC AUTOMATED: CPT | Performed by: INTERNAL MEDICINE

## 2020-06-13 PROCEDURE — 80048 BASIC METABOLIC PNL TOTAL CA: CPT | Performed by: INTERNAL MEDICINE

## 2020-06-13 PROCEDURE — 25000132 ZZH RX MED GY IP 250 OP 250 PS 637: Performed by: INTERNAL MEDICINE

## 2020-06-13 PROCEDURE — 00000146 ZZHCL STATISTIC GLUCOSE BY METER IP

## 2020-06-13 PROCEDURE — 99239 HOSP IP/OBS DSCHRG MGMT >30: CPT | Performed by: INTERNAL MEDICINE

## 2020-06-13 PROCEDURE — 36415 COLL VENOUS BLD VENIPUNCTURE: CPT | Performed by: INTERNAL MEDICINE

## 2020-06-13 PROCEDURE — 25000128 H RX IP 250 OP 636: Performed by: PHYSICIAN ASSISTANT

## 2020-06-13 PROCEDURE — 25000125 ZZHC RX 250: Performed by: INTERNAL MEDICINE

## 2020-06-13 RX ORDER — SULFAMETHOXAZOLE/TRIMETHOPRIM 800-160 MG
1 TABLET ORAL 2 TIMES DAILY
Qty: 14 TABLET | Refills: 0 | Status: SHIPPED | OUTPATIENT
Start: 2020-06-13 | End: 2020-06-20

## 2020-06-13 RX ADMIN — LEVOTHYROXINE SODIUM 100 MCG: 0.1 TABLET ORAL at 09:30

## 2020-06-13 RX ADMIN — OMEPRAZOLE 40 MG: 20 CAPSULE, DELAYED RELEASE ORAL at 09:30

## 2020-06-13 RX ADMIN — CEFTRIAXONE 2 G: 2 INJECTION, POWDER, FOR SOLUTION INTRAMUSCULAR; INTRAVENOUS at 09:29

## 2020-06-13 RX ADMIN — IBUPROFEN 400 MG: 400 TABLET ORAL at 05:59

## 2020-06-13 RX ADMIN — ESCITALOPRAM 20 MG: 5 TABLET, FILM COATED ORAL at 09:30

## 2020-06-13 RX ADMIN — IBUPROFEN 400 MG: 400 TABLET ORAL at 00:12

## 2020-06-13 RX ADMIN — DULOXETINE HYDROCHLORIDE 30 MG: 30 CAPSULE, DELAYED RELEASE ORAL at 09:30

## 2020-06-13 RX ADMIN — PANCRELIPASE 104400 UNITS: 20880; 78300; 78300 TABLET ORAL at 07:44

## 2020-06-13 RX ADMIN — MODAFINIL 400 MG: 200 TABLET ORAL at 09:30

## 2020-06-13 RX ADMIN — ERYTHROMYCIN 0.5 G: 5 OINTMENT OPHTHALMIC at 09:35

## 2020-06-13 ASSESSMENT — ACTIVITIES OF DAILY LIVING (ADL)
ADLS_ACUITY_SCORE: 10

## 2020-06-13 NOTE — PLAN OF CARE
End of Shift Summary  For vital signs and complete assessments, please see documentation flowsheets.     Pertinent assessments: VSS, tmax 99.5 Ambulates independently. Incision to eyebrows clean,dry, intact.  Denies nausea and SOB.  Ibuprofen given for headache.     Major Shift Events: ibuprofen for headache    Treatment Plan: IV rocephin, IVF    Discharge Readiness: Medically active  Expected Discharge Date: TBD  Discharge Disposition: Home with Self care  Barriers/Criteria for discharge: afebrile

## 2020-06-13 NOTE — PLAN OF CARE
Pt hospitalized for UTI.  Discharge education provided to patient and patient verbalized understanding of medications and orders.  Medications filled at [facility] pharmacy.   Pt verbalized will follow up with primary care provider if worsening symptoms.  Patient discharging to home  And transportation provided by . No further questions at this time.

## 2020-06-13 NOTE — DISCHARGE SUMMARY
[facility]  Discharge Summary  Name: Lynn Thompson    MRN: [id number]  YOB: 1965    Age: 55 year old  Date of Discharge:  6/13/2020  Date of Admission: 6/11/2020  Primary Care Provider: Maryana Brooks  Discharge Physician:  Nnamdi Ahumada MD  Discharging Service:  Hospitalist      Discharge Diagnoses:  Sepsis secondary to acute pyelonephritis  Hypokalemia  IDDM  Recent ptosis surgery  BLU  Hypothyroidism  Chronic pain syndrome  MDD  Anxiety  GERD     Hospital Course:  Summary of Stay:  Lynn Thompson is a 55 year old female  with history of previous islet cell transplant 2013, IDDM, iron deficiency anemia, BLU on CPAP, hypothyroidism, MDD, GERD, chronic pain, and nephrolithiasis who was admitted on 6/11/2020 with dysuria, fevers, and myalgias with urinalysis consistent with UTI.  Also with some left flank pain so more likely pyelonephritis.  COVID19 PCR negative.  Started on IV ceftriaxone.  Urine culture growing pansensitive E. coli.  Blood cultures no growth to date.  Fevers  and leukocytosis resolved.  Feeling much better today.  Discharged home on additional 7 days of oral Bactrim which she has tolerated before.    Sepsis secondary to pyelonephritis: Primary symptoms of dysuria, fever, and myalgias with urinalysis with significant pyuria, large LE, and positive nitrates consistent with UTI.  Initially with fever, leukocytosis, and tachycardia consistent with sepsis.  Lactic acid not elevated.  COVID19 PCR negative.  Has ongoing mild to moderate constant left-sided flank pain so likely pyelonephritis.  Less suspicious for obstructive stone given no hematuria and no renal colic type of pain.  Fevers and leukocytosis resolved.  Symptoms significantly improved and okay for discharge.  -Received 3 days IV ceftriaxone 2g every 24 hours, complete course of oral DS Bactrim twice daily for additional 7 days  -acetaminophen, ibuprofen as needed for pain, headaches, and  myalgias  -Urine culture growing pansensitive E. coli  -Blood cultures NGTD     IDDM: Previous auto islet cell transplant in 2013.  She reports difficult to control diabetes at home with either very high or very low blood sugars despite only using small amounts of insulin.  Did have hypoglycemia after home Lantus 6 units initially which is since resolved.  Resume PTA regimen on discharge and follow-up with provider.       Recent ptosis surgery: Recent bilateral surgery for ptosis.  Sutures in place.  No surrounding erythema or significant pain in this area.  -Resumed PTA erythromycin ointment 3 times daily through 6/18/2020     BLU: Resume CPAP.     Hypothyroidism: Resume PTA levothyroxine.     Chronic pain syndrome: Resume PTA duloxetine, gabapentin 300 mg at bedtime.  Acetaminophen and oxycodone as needed for acute pain.     MDD, anxiety:  resume PTA duloxetine 30 mg twice daily, escitalopram 20 mg daily, trazodone 50 mg at bedtime, and hydroxyzine as needed for anxiety.  She is also on modafinil for 100 mg daily which will be resumed.     Hypokalemia: Initial potassium 3.3.    This was replaced while here.GERD: Resume PTA omeprazole twice daily and Pepcid as needed.     Resume PTA estradiol vaginal cream and estradiol-norethindrone tablets daily.     Discharge Disposition:  Discharged to home     Allergies:  Allergies   Allergen Reactions     Corticosteroids Other (See Comments)     All oral, IV and injectable steroids are contraindicated (unless in life threatening situations) in Islet Auto transplant recipients. They can cause irreversible loss of islet cell function. Please contact patient's transplant care coordinator, Erlinda Multani RN BSN at [phone]/pager: [phone] and endocrinologist prior to administration.       Povidone Iodine Hives     Causes skin to blister     Naproxen      Other reaction(s): Abdominal Pain  Pt allergic to Naprosyn     Nsaids      naprosyn = GI upset     Povidone Iodine       blisters     Sulfasalazine Nausea and Nausea and Vomiting        Discharge Medications:   Current Discharge Medication List      START taking these medications    Details   sulfamethoxazole-trimethoprim (BACTRIM DS) 800-160 MG tablet Take 1 tablet by mouth 2 times daily for 7 days . First dose 6/14 AM  Qty: 14 tablet, Refills: 0    Associated Diagnoses: Pyelonephritis, acute         CONTINUE these medications which have NOT CHANGED    Details   acetaminophen (TYLENOL) 325 MG tablet Take 325 mg by mouth every 6 hours as needed for pain       !! amylase-lipase-protease (VIOKACE) 78624 units TABS tablet Take 5 capsules by mouth 3 times daily (with meals)      !! amylase-lipase-protease (VIOKACE) 32210 units TABS tablet Take 2 capsules by mouth Take with snacks or supplements      DULoxetine (CYMBALTA) 30 MG capsule Take 1 capsule (30 mg) by mouth 2 times daily Fill at patient request.  Qty: 180 capsule, Refills: 3    Associated Diagnoses: Anxiety      erythromycin (ROMYCIN) 5 MG/GM ophthalmic ointment Apply 0.5 inches topically 3 times daily Apply thin ribbon over upper brow incisions 3x per day until follow up/suture removal 6/18/20.  Qty: 2 Tube, Refills: 0    Associated Diagnoses: Brow ptosis      escitalopram (LEXAPRO) 20 MG tablet Take 20 mg by mouth daily      estradiol (ESTRACE) 0.1 MG/GM vaginal cream PLACE 2 GRAMS VAGINALLY TWICE A WEEK  Qty: 42.5 g, Refills: 11    Associated Diagnoses: Vaginal atrophy      Estradiol-Norethindrone Acet 0.5-0.1 MG TABS Take 1 tablet by mouth daily  Qty: 84 tablet, Refills: 0    Comments: Pt due for annual exam before having filled again.  Associated Diagnoses: Decreased libido      hydrOXYzine (ATARAX) 25 MG tablet TAKE 1-2 TABLETS BY MOUTH 2 TIMES DAILY AS NEEDED FOR ANXIETY  Refills: 0      !! insulin aspart (NOVOLOG FLEXPEN) 100 UNIT/ML pen Inject Subcutaneous 3 times daily (with meals) 1 units per 15 grams of carbohydrate. Do not give if pre-prandial glucose is less  than 60 mg/dL.      !! insulin aspart (NOVOLOG FLEXPEN) 100 UNIT/ML pen Inject 1-5 Units Subcutaneous 3 times daily (with meals) For Pre-Meal  -214 give 1 unit.  For Pre-Meal  -289 give 2 unit  For Pre-Meal  -364 give 3 unit.  For Pre-Meal  - 439 give 4 unit.   For Pre-Meal BG greater than or equal to 440 give 5 units.      insulin glargine (LANTUS PEN) 100 UNIT/ML pen Inject 6 Units Subcutaneous daily (with dinner)    Comments: If Lantus is not covered by insurance, may substitute Basaglar at same dose and frequency.        levothyroxine (SYNTHROID/LEVOTHROID) 100 MCG tablet Take 1 tablet (100 mcg) by mouth daily  Qty: 90 tablet, Refills: 0    Associated Diagnoses: Acquired hypothyroidism      loratadine (CLARITIN) 10 MG tablet Take 10 mg by mouth daily as needed       modafinil (PROVIGIL) 200 MG tablet Take 400 mg by mouth daily      omeprazole (PRILOSEC) 40 MG DR capsule Take 1 capsule (40 mg) by mouth 2 times daily Take 30-60 minutes before a meal.  Qty: 180 capsule, Refills: 3    Associated Diagnoses: Gastroesophageal reflux disease without esophagitis      ondansetron (ZOFRAN-ODT) 4 MG ODT tab Take 4 mg by mouth every 8 hours as needed for nausea      blood glucose (BILL MICROLET 2) lancing device Use to test blood sugars 6 times daily or as directed.  Qty: 1 each, Refills: 11    Associated Diagnoses: Absence of pancreas, acquired      blood glucose monitoring (BILL CONTOUR NEXT) test strip Check BS 4-6 times a day  Qty: 2 Box, Refills: 6    Associated Diagnoses: Absence of pancreas, acquired      famotidine (PEPCID) 40 MG tablet Take 1 tablet (40 mg) by mouth 2 times daily as needed for heartburn  Qty: 180 tablet, Refills: 3    Associated Diagnoses: Gastroesophageal reflux disease with esophagitis      gabapentin (NEURONTIN) 300 MG capsule Take 1 capsule by mouth At Bedtime       glucose 40 % GEL Take 15-30 g by mouth every 15 minutes as needed.  Qty: 1 Tube, Refills: 11     "Associated Diagnoses: Chronic pancreatitis (H)      insulin pen needle (BD MAHESH U/F) 32G X 4 MM Use up to 3 times daily as needed for insulin dosing  Qty: 100 each, Refills: prn    Associated Diagnoses: Acquired absence of pancreas      Sharps Container MISC For insulin needles  Qty: 1 each, Refills: prn    Associated Diagnoses: Absence of pancreas, acquired      traZODone (DESYREL) 50 MG tablet Take 1 tablet (50 mg) by mouth At Bedtime  Qty: 90 tablet, Refills: 3    Associated Diagnoses: Persistent insomnia      !! UNABLE TO FIND MEDICATION NAME: Dopatone Active      !! UNABLE TO FIND MEDICATION NAME: magnezyme      !! UNABLE TO FIND MEDICATION NAME: immunoG PRP      !! UNABLE TO FIND MEDICATION NAME: GI-Synergy       !! - Potential duplicate medications found. Please discuss with provider.           Condition on Discharge:  Discharge condition: Good   Discharge vitals: Blood pressure 123/54, pulse 65, temperature 98.3  F (36.8  C), temperature source Oral, resp. rate 16, height 1.626 m (5' 4\"), weight 67.4 kg (148 lb 9.6 oz), last menstrual period 12/19/2013, SpO2 96 %, not currently breastfeeding.   Code status on discharge: Full Code     History of Illness:  See detailed admission note for full details.    Physical Exam:  Blood pressure 123/54, pulse 65, temperature 98.3  F (36.8  C), temperature source Oral, resp. rate 16, height 1.626 m (5' 4\"), weight 67.4 kg (148 lb 9.6 oz), last menstrual period 12/19/2013, SpO2 96 %, not currently breastfeeding.  Wt Readings from Last 1 Encounters:   06/11/20 67.4 kg (148 lb 9.6 oz)     Constitutional: Awake, NAD   Eyes: sclera white   HEENT:  MMM  Respiratory:  lungs cta bilaterally, no crackles or wheeze  Cardiovascular: RRR.  No murmur   GI: non-tender, not distended, bowel sounds present  Skin: no rash   Musculoskeletal/extremities: No edema  Neurologic: A&O, speech clear  Psychiatric: calm, cooperative, normal affect    Procedures other than Imaging:  None "     Imaging:  Results for orders placed or performed during the hospital encounter of 06/11/20   XR Chest Port 1 View    Narrative    XR CHEST PORT 1 VW 6/11/2020 6:59 AM    HISTORY: fever    COMPARISON: 3/8/2019      Impression    IMPRESSION: No consolidation. No pleural effusions. No pneumothorax.  Normal cardiac size.    ADAMA VARGAS MD     *Note: Due to a large number of results and/or encounters for the requested time period, some results have not been displayed. A complete set of results can be found in Results Review.        Consultations:  No consultations were requested during this admission.       Recent Lab Results:  Recent Labs   Lab 06/13/20  0647 06/12/20  0631 06/11/20  0646   WBC 11.0 17.2* 17.0*   HGB 9.9* 11.4* 11.7   HCT 31.7* 36.6 36.6   MCV 96 96 94    282 299     Recent Labs   Lab 06/11/20  0833 06/11/20  0646 06/11/20  0645   CULT >100,000 colonies/mL  Escherichia coli  * No growth after 2 days No growth after 2 days     Recent Labs   Lab 06/13/20  0647 06/12/20  0631 06/11/20  2157 06/11/20  0646    139  --  139   POTASSIUM 3.8 4.2 4.6 3.3*   CHLORIDE 111* 109  --  106   CO2 26 26  --  27   ANIONGAP 3 4  --  6   GLC 87 69*  --  123*   BUN 14 17  --  16   CR 0.87 0.93  --  0.90   GFRESTIMATED 74 68  --  71   GFRESTBLACK 85 79  --  82   VALARIE 8.2* 8.3*  --  8.3*   PROTTOTAL  --   --   --  6.4*   ALBUMIN  --   --   --  3.0*   BILITOTAL  --   --   --  0.4   ALKPHOS  --   --   --  107   AST  --   --   --  19   ALT  --   --   --  26     Recent Labs   Lab 06/11/20  0646   LACT 0.7     Recent Labs   Lab 06/11/20  0833   COLOR Light Yellow   APPEARANCE Slightly Cloudy   URINEGLC Negative   URINEBILI Negative   URINEKETONE Negative   SG 1.009   UBLD Trace*   URINEPH 5.5   PROTEIN 10*   NITRITE Positive*   LEUKEST Large*   RBCU 4*   WBCU >182*          Pending Results:    Unresulted Labs Ordered in the Past 30 Days of this Admission     Date and Time Order Name Status Description     6/11/2020 0631 Blood culture Preliminary     6/11/2020 0631 Blood culture Preliminary          These results will be followed up by patient's primary care provider.    Discharge Instructions and Follow-Up:   Discharge Procedure Orders   Reason for your hospital stay   Order Comments: You were hospitalized for a urinary tract infection that likely involve the left kidney.  Your blood cultures are so far negative.  This improved with IV antibiotics and you should continue to do well on a oral course of Bactrim over the next 7 days.  You can use acetaminophen and ibuprofen as needed for pain control or fevers.  Return for evaluation if persistent high-grade fevers above 101  F or worsening shaking chills.     Follow-up and recommended labs and tests    Order Comments: Follow up with primary care provider, Maryana Brooks, as needed if symptoms fail to improve.     Activity   Order Comments: Your activity upon discharge: activity as tolerated     Order Specific Question Answer Comments   Is discharge order? Yes      Full Code     Order Specific Question Answer Comments   Code status determined by: Discussion with patient/legal decision maker      Diet   Order Comments: Follow this diet upon discharge: Orders Placed This Encounter      Moderate Consistent CHO Diet     Order Specific Question Answer Comments   Is discharge order? Yes          I, Nnamdi Ahumada MD, personally saw the patient today and spent greater than 30 minutes discharging this patient.    Nnamdi Ahumada MD

## 2020-06-14 DIAGNOSIS — E03.9 ACQUIRED HYPOTHYROIDISM: ICD-10-CM

## 2020-06-15 ENCOUNTER — TELEPHONE (OUTPATIENT)
Dept: FAMILY MEDICINE | Facility: CLINIC | Age: 55
End: 2020-06-15

## 2020-06-15 RX ORDER — LEVOTHYROXINE SODIUM 100 UG/1
100 TABLET ORAL DAILY
Qty: 90 TABLET | Refills: 0 | OUTPATIENT
Start: 2020-06-15

## 2020-06-15 NOTE — TELEPHONE ENCOUNTER
ED / Discharge Outreach Protocol    Patient Contact    Attempt # 1    Was call answered?  No.  Left message on voicemail with information to call me back.    Follow up with primary care provider, Maryana Brooks, as needed if symptoms fail to improve.    Sarika Osullivan RN

## 2020-06-16 NOTE — TELEPHONE ENCOUNTER
ED / Discharge Outreach Protocol    Patient Contact    Attempt # 2    Was call answered?  No.  Left message on voicemail with information to call me back.    Sarika Osullivan RN'

## 2020-06-17 LAB
BACTERIA SPEC CULT: NO GROWTH
BACTERIA SPEC CULT: NO GROWTH
SPECIMEN SOURCE: NORMAL
SPECIMEN SOURCE: NORMAL

## 2020-06-17 NOTE — TELEPHONE ENCOUNTER
"Hospital/TCU/ED for chronic condition Discharge Protocol    \"Hi, my name is Neel Zarate RN, a registered nurse, and I am calling from [facility].  I am calling to follow up and see how things are going for you after your recent emergency visit/hospital/TCU stay.\"    Tell me how you are doing now that you are home?\" doing pretty good      Discharge Instructions    \"Let's review your discharge instructions.  What is/are the follow-up recommendations?  Pt. Response: follow up prn    \"Has an appointment with your primary care provider been scheduled?\"   not needed    \"When you see the provider, I would recommend that you bring your medications with you.\"    Medications    \"Tell me what changed about your medicines when you discharged?\"    Changes to chronic meds?    0-1    \"What questions do you have about your medications?\"    None     New diagnoses of heart failure, COPD, diabetes, or MI?    No              Post Discharge Medication Reconciliation Status: discharge medications reconciled, continue medications without change.    Was MTM referral placed (*Make sure to put transitions as reason for referral)?   No    Call Summary    \"What questions or concerns do you have about your recent visit and your follow-up care?\"     none    \"If you have questions or things don't continue to improve, we encourage you contact us through the main clinic number (give number).  Even if the clinic is not open, triage nurses are available 24/7 to help you.     We would like you to know that our clinic has extended hours (provide information).  We also have urgent care (provide details on closest location and hours/contact info)\"      \"Thank you for your time and take care!\"       Neel Zarate RN, BSN      "

## 2020-06-18 ENCOUNTER — TELEPHONE (OUTPATIENT)
Dept: FAMILY MEDICINE | Facility: CLINIC | Age: 55
End: 2020-06-18

## 2020-06-18 DIAGNOSIS — B37.31 YEAST INFECTION OF THE VAGINA: Primary | ICD-10-CM

## 2020-06-18 RX ORDER — FLUCONAZOLE 150 MG/1
150 TABLET ORAL ONCE
Qty: 1 TABLET | Refills: 0 | Status: SHIPPED | OUTPATIENT
Start: 2020-06-18 | End: 2020-06-18

## 2020-06-18 NOTE — TELEPHONE ENCOUNTER
Please advise.  Pt was recently discharged from hospital for pyelonephritis.    Neel Zarate RN, BSN

## 2020-06-18 NOTE — TELEPHONE ENCOUNTER
Medication Question or Refill  Who is calling: Patient   What medication are you calling about (include dose and sig)?: rx for yeast infection   Controlled Substance Agreement on file: No  Who prescribed the medication?:   Do you need a refill?   When did you use the medication last?   Patient offered an appointment? No  Do you have any questions or concerns?  Yes: Pt is on an antibiotic and now has a yeast infection. She is wondering if a rx could be placed for her.   Requested Pharmacy:   Okay to leave a detailed message?: Yes at Cell number on file:    Telephone Information:   Mobile [phone]

## 2020-07-18 ENCOUNTER — TELEPHONE (OUTPATIENT)
Dept: FAMILY MEDICINE | Facility: CLINIC | Age: 55
End: 2020-07-18

## 2020-07-18 NOTE — TELEPHONE ENCOUNTER
Disp  Refills  Start  End  AMBER    ranitidine (ZANTAC) 150 MG tablet (Discontinued)  180 tablet  3  7/9/2019 6/9/2020  No    Sig - Route: Take 1 tablet (150 mg) by mouth 2 times daily - Oral

## 2020-08-03 DIAGNOSIS — K21.9 GASTROESOPHAGEAL REFLUX DISEASE WITHOUT ESOPHAGITIS: ICD-10-CM

## 2020-08-03 DIAGNOSIS — G47.00 PERSISTENT INSOMNIA: ICD-10-CM

## 2020-08-03 RX ORDER — OMEPRAZOLE 40 MG/1
40 CAPSULE, DELAYED RELEASE ORAL 2 TIMES DAILY
Qty: 180 CAPSULE | Refills: 3 | Status: SHIPPED | OUTPATIENT
Start: 2020-08-03 | End: 2021-09-07

## 2020-08-03 RX ORDER — TRAZODONE HYDROCHLORIDE 50 MG/1
50 TABLET, FILM COATED ORAL AT BEDTIME
Qty: 90 TABLET | Refills: 3 | Status: ON HOLD | OUTPATIENT
Start: 2020-08-03 | End: 2020-10-29

## 2020-08-03 NOTE — TELEPHONE ENCOUNTER
Prescription approved per [facility] Refill Protocol.  Neel Zarate RN, BSN

## 2020-08-03 NOTE — TELEPHONE ENCOUNTER
Routing refill request to provider for review/approval because:  Drug interaction warning  Neel Zarate RN, BSN

## 2020-08-17 ENCOUNTER — OFFICE VISIT (OUTPATIENT)
Dept: URGENT CARE | Facility: URGENT CARE | Age: 55
End: 2020-08-17
Payer: MEDICARE

## 2020-08-17 VITALS
DIASTOLIC BLOOD PRESSURE: 86 MMHG | HEART RATE: 66 BPM | SYSTOLIC BLOOD PRESSURE: 168 MMHG | TEMPERATURE: 98.9 F | OXYGEN SATURATION: 100 % | BODY MASS INDEX: 22.83 KG/M2 | WEIGHT: 133 LBS
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
## 2020-08-17 DIAGNOSIS — L53.9 ERYTHEMA OF SKIN: ICD-10-CM

## 2020-08-17 DIAGNOSIS — S51.852A DOG BITE OF LEFT FOREARM, INITIAL ENCOUNTER: Primary | ICD-10-CM

## 2020-08-17 DIAGNOSIS — Z23 VACCINE FOR DIPHTHERIA-TETANUS-PERTUSSIS, COMBINED: ICD-10-CM

## 2020-08-17 PROCEDURE — 90471 IMMUNIZATION ADMIN: CPT | Performed by: FAMILY MEDICINE

## 2020-08-17 PROCEDURE — 99214 OFFICE O/P EST MOD 30 MIN: CPT | Mod: 25 | Performed by: FAMILY MEDICINE

## 2020-08-17 PROCEDURE — 90715 TDAP VACCINE 7 YRS/> IM: CPT | Performed by: FAMILY MEDICINE

## 2020-08-18 DIAGNOSIS — K86.89 PANCREATIC INSUFFICIENCY: ICD-10-CM

## 2020-08-18 DIAGNOSIS — Z90.410 POST-PANCREATECTOMY DIABETES (H): Primary | ICD-10-CM

## 2020-08-18 DIAGNOSIS — E13.9 POST-PANCREATECTOMY DIABETES (H): Primary | ICD-10-CM

## 2020-08-18 DIAGNOSIS — E89.1 POST-PANCREATECTOMY DIABETES (H): Primary | ICD-10-CM

## 2020-08-18 NOTE — PATIENT INSTRUCTIONS
Patient Education     Dog Bite  A dog bite can cause a wound deep enough to break the skin. In such cases, the wound is cleaned and sometimes closed. If the wound is closed, it is usually not completely closed. This is so that fluid can drain if the wound becomes infected. Often, wounds will be left open to heal. In addition to wound care, a tetanus shot may be given, if needed.    Home care    Wash your hands well with soap and warm water before and after caring for the wound. This helps lower the risk of infection.    Care for the wound as directed. If a dressing was applied to the wound, be sure to change it as directed.    If the wound bleeds, place a clean, soft cloth on the wound. Then firmly apply pressure until the bleeding stops. This may take up to 5 minutes. Do not release the pressure and look at the wound during this time.    Most wounds heal within 10 days. But an infection can occur even with proper treatment. So be sure to check the wound daily for signs of infection (see below).    Antibiotics may be prescribed. These help prevent or treat infection. If you re given antibiotics, take them as directed. Also be sure to complete the medicines.  Rabies prevention  Rabies is a virus that can be carried in certain animals. These can include domestic animals such as dogs and cats. Pets fully vaccinated against rabies (2 shots) are at very low risk of infection. But because human rabies is almost always fatal, any biting pet should be confined for 10 days as an extra precaution. In general, if there is a risk for rabies, the following steps may need to be taken:    If someone s pet dog has bitten you, it should be kept in a secure area for the next 10 days to watch for signs of illness. (If the pet owner won t allow this, contact your local animal control center.) If the dog becomes ill or dies during that time, contact your local animal control center at once so the animal may be tested for rabies. If  the dog stays healthy for the next 10 days, there is no danger of rabies in the animal or you.  ? If a stray dog bit you, contact your local animal control center. They can give information on capture, quarantine, and animal rabies testing.  ? If you can t find the animal that bit you in the next 2 days, and if rabies exists in your area, you may need to receive the rabies vaccine series. Call your healthcare provider right away. Or, return to the emergency department promptly.  ? All animal bites should be reported to the local animal control center. If you were not given a form to fill out, you can report this yourself.  Follow-up care  Follow up with your healthcare provider, or as directed.  When to seek medical advice  Call your healthcare provider right away if any of these occur:    Signs of infection:  ? Spreading redness or warmth from the wound  ? Increased pain or swelling  ? Fever of 100.4 F (38 C) or higher, or as directed by your healthcare provider  ? Colored fluid or pus draining from the wound    Signs of rabies infection:  ? Headache  ? Confusion  ? Strange behavior  ? Increased salivating and drooling  ? Seizure    Decreased ability to move any body part near the wound    Bleeding that can't be stopped after 5 minutes of firm pressure  Date Last Reviewed: 3/1/2017    2922-8986 [employer]. [address] [place] [address], [place] [address]. All rights reserved. This information is not intended as a substitute for professional medical care. Always follow your healthcare professional's instructions.

## 2020-08-18 NOTE — PROGRESS NOTES
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  She was bitten by a dog yesterday.   Cicumstances of bite: entered animals domain.  Severity: moderate, bite with skin break.  Animal's immunizations not up to date    Associated symptoms: immediate pain, deformity was immediately noted, redness noted, increasing pain , swelling and edema at site    last tetanus booster 10 years ago    Past Medical History:   Diagnosis Date     Abdominal pain, epigastric 11/05, ongoing; goes to pain clinic    probable recurrent spasm sphincter of Oddi causing biliary colic pains      Benign paroxysmal positional vertigo     occ.      Calculus of kidney 5/05    x1 on L side passed, several stones.  Has been tested for oxalate.     Chronic abdominal pain 7/17/2013     Chronic pancreatitis (H) 7/17/2013     Depressive disorder, not elsewhere classified     also occ panic spells     Diabetes (H)     post pancreatectomy     Dyspepsia and other specified disorders of function of stomach 6/99    H. 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    [place] [facility] [place]     Spasm of sphincter of Oddi     surgical [place] [facility] [place]     Thyroid nodule 11/1/2016     Ureteral calculus 10/2/2012     Vaccination not carried out        Current Outpatient Medications:       amoxicillin-clavulanate (AUGMENTIN) 875-125 MG tablet, Take 1 tablet by mouth 2 times daily, Disp: 20 tablet, Rfl: 0     acetaminophen (TYLENOL) 325 MG tablet, Take 325 mg by mouth every 6 hours as needed for pain , Disp: , Rfl:      amylase-lipase-protease (VIOKACE) 96212 units TABS tablet, Take 5 capsules by mouth 3 times daily (with meals), Disp: , Rfl:      amylase-lipase-protease (VIOKACE) 80533 units TABS tablet, Take 2 capsules by mouth Take with snacks or supplements, Disp: , Rfl:      blood glucose (BILL MICROLET 2) lancing device, Use to test blood sugars 6 times daily or as directed., Disp: 1 each, Rfl: 11     blood glucose monitoring (BILL CONTOUR NEXT) test strip, Check BS 4-6 times a day, Disp: 2 Box, Rfl: 6     DULoxetine (CYMBALTA) 30 MG capsule, Take 1 capsule (30 mg) by mouth 2 times daily Fill at patient request., Disp: 180 capsule, Rfl: 3     erythromycin (ROMYCIN) 5 MG/GM ophthalmic ointment, Apply 0.5 inches topically 3 times daily Apply thin ribbon over upper brow incisions 3x per day until follow up/suture removal 6/18/20., Disp: 2 Tube, Rfl: 0     escitalopram (LEXAPRO) 20 MG tablet, Take 20 mg by mouth daily, Disp: , Rfl:      estradiol (ESTRACE) 0.1 MG/GM vaginal cream, PLACE 2 GRAMS VAGINALLY TWICE A WEEK, Disp: 42.5 g, Rfl: 11     Estradiol-Norethindrone Acet 0.5-0.1 MG TABS, Take 1 tablet by mouth daily, Disp: 84 tablet, Rfl: 0     famotidine (PEPCID) 40 MG tablet, Take 1 tablet (40 mg) by mouth 2 times daily as needed for heartburn, Disp: 180 tablet, Rfl: 3     gabapentin (NEURONTIN) 300 MG capsule, Take 1 capsule by mouth At Bedtime , Disp: , Rfl:      glucose 40 % GEL, Take 15-30 g by mouth every 15 minutes as needed., Disp: 1 Tube, Rfl: 11     hydrOXYzine (ATARAX) 25 MG tablet, TAKE 1-2 TABLETS BY MOUTH 2 TIMES DAILY AS NEEDED FOR ANXIETY, Disp: , Rfl: 0     insulin aspart (NOVOLOG FLEXPEN) 100 UNIT/ML pen, Inject Subcutaneous 3 times daily (with meals) 1 units per 15 grams of  carbohydrate. Do not give if pre-prandial glucose is less than 60 mg/dL., Disp: , Rfl:      insulin aspart (NOVOLOG FLEXPEN) 100 UNIT/ML pen, Inject 1-5 Units Subcutaneous 3 times daily (with meals) For Pre-Meal  -214 give 1 unit. For Pre-Meal  -289 give 2 unit For Pre-Meal  -364 give 3 unit. For Pre-Meal  - 439 give 4 unit.  For Pre-Meal BG greater than or equal to 440 give 5 units., Disp: , Rfl:      insulin glargine (LANTUS PEN) 100 UNIT/ML pen, Inject 6 Units Subcutaneous daily (with dinner), Disp: , Rfl:      insulin pen needle (BD MAHESH U/F) 32G X 4 MM, Use up to 3 times daily as needed for insulin dosing, Disp: 100 each, Rfl: prn     levothyroxine (SYNTHROID/LEVOTHROID) 100 MCG tablet, Take 1 tablet (100 mcg) by mouth daily, Disp: 90 tablet, Rfl: 0     loratadine (CLARITIN) 10 MG tablet, Take 10 mg by mouth daily as needed , Disp: , Rfl:      modafinil (PROVIGIL) 200 MG tablet, Take 400 mg by mouth daily, Disp: , Rfl:      omeprazole (PRILOSEC) 40 MG DR capsule, Take 1 capsule (40 mg) by mouth 2 times daily Take 30-60 minutes before a meal., Disp: 180 capsule, Rfl: 3     ondansetron (ZOFRAN-ODT) 4 MG ODT tab, Take 4 mg by mouth every 8 hours as needed for nausea, Disp: , Rfl:      Sharps Container MISC, For insulin needles, Disp: 1 each, Rfl: prn     traZODone (DESYREL) 50 MG tablet, Take 1 tablet (50 mg) by mouth At Bedtime, Disp: 90 tablet, Rfl: 3     UNABLE TO FIND, MEDICATION NAME: Dopatone Active, Disp: , Rfl:      UNABLE TO FIND, MEDICATION NAME: magnezyme, Disp: , Rfl:      UNABLE TO FIND, MEDICATION NAME: immunoG PRP, Disp: , Rfl:      UNABLE TO FIND, MEDICATION NAME: GI-Synergy, Disp: , Rfl:   Social History     Tobacco Use     Smoking status: Never Smoker     Smokeless tobacco: Never Used   Substance Use Topics     Alcohol use: No     Alcohol/week: 0.0 standard drinks     Frequency: Never     Drinks per session: Patient refused     Binge frequency: Never       ROS:  10 point  ROS of systems including Constitutional, Eyes, Respiratory, Cardiovascular, Gastroenterology, Genitourinary,, Muscularskeletal, Psychiatric were all negative except for pertinent positives noted in my HPI           OBJECTIVE:  BP (!) 168/86   Pulse 66   Temp 98.9  F (37.2  C) (Tympanic)   Wt 60.3 kg (133 lb)   LMP 12/19/2013   SpO2 100%   BMI 22.83 kg/m    GENERAL: healthy, alert no acute distress  SKIN: abrasion, puncture wound, swelling and tenderness to palpation of forearm  Left  It measures almost 5 cm x 4 cm no streaking was noted no drainage was noted              GENERAL APPEARANCE: healthy, alert and no distress  EYES: EOMI,  PERRL, conjunctiva clear  HENT: ear canals and TM's normal.  Nose and mouth without ulcers, erythema or lesions  RESP: lungs clear to auscultation - no rales, rhonchi or wheezes  CV: regular rates and rhythm, normal S1 S2, no murmer noted  MS:extremities normal- no gross deformities noted,  FROM noted in all extremities    ASSESSMENT:  Lynn was seen today for urgent care and dog bite.    Diagnoses and all orders for this visit:    Dog bite of left forearm, initial encounter  -     amoxicillin-clavulanate (AUGMENTIN) 875-125 MG tablet; Take 1 tablet by mouth 2 times daily  -     TDAP, IM (10 - 64 YRS) - Adacel    Vaccine for diphtheria-tetanus-pertussis, combined    Erythema of skin        Differential diagnosis dog bite/skin abrasion/puncture wound/cellulitis /soft tissue swelling  pLAN:  See orders in epic  Her blood pressure was noted to elevated which could be related to the pain  Area was marked with a marking pen.  It measures almost 5 cm x 4 cm  Discussed with patient that there is definite localized infection.  We will treat her with antibiotic discussed if symptoms do not get better over the 48 hours should follow-up  Continue watching for any worsening signs or symptoms can do Tylenol/ibuprofen keep area clean  Was given a tetanus vaccine as patient will be due for  one within a month  Follow up if  symptoms fail to improve or worsens   Pt understood and agreed with plan      Maria Del Carmen Brooks MD

## 2020-08-19 DIAGNOSIS — K86.89 PANCREATIC INSUFFICIENCY: Primary | ICD-10-CM

## 2020-08-20 ENCOUNTER — OFFICE VISIT (OUTPATIENT)
Dept: TRANSPLANT | Facility: CLINIC | Age: 55
End: 2020-08-20
Attending: PEDIATRICS
Payer: MEDICARE

## 2020-08-20 VITALS
SYSTOLIC BLOOD PRESSURE: 147 MMHG | HEART RATE: 76 BPM | OXYGEN SATURATION: 97 % | DIASTOLIC BLOOD PRESSURE: 83 MMHG | WEIGHT: 134.92 LBS | BODY MASS INDEX: 23.16 KG/M2

## 2020-08-20 VITALS — WEIGHT: 134.92 LBS | BODY MASS INDEX: 23.16 KG/M2

## 2020-08-20 DIAGNOSIS — E89.1 POST-PANCREATECTOMY DIABETES (H): Primary | ICD-10-CM

## 2020-08-20 DIAGNOSIS — Z90.410 POST-PANCREATECTOMY DIABETES (H): Primary | ICD-10-CM

## 2020-08-20 DIAGNOSIS — E13.9 POST-PANCREATECTOMY DIABETES (H): Primary | ICD-10-CM

## 2020-08-20 DIAGNOSIS — Z90.410 POST-PANCREATECTOMY DIABETES (H): ICD-10-CM

## 2020-08-20 DIAGNOSIS — K86.89 PANCREATIC INSUFFICIENCY: ICD-10-CM

## 2020-08-20 DIAGNOSIS — E13.9 POST-PANCREATECTOMY DIABETES (H): ICD-10-CM

## 2020-08-20 DIAGNOSIS — E89.1 POST-PANCREATECTOMY DIABETES (H): ICD-10-CM

## 2020-08-20 LAB
ALBUMIN SERPL-MCNC: 3.5 G/DL (ref 3.4–5)
ALP SERPL-CCNC: 106 U/L (ref 40–150)
ALT SERPL W P-5'-P-CCNC: 29 U/L (ref 0–50)
ANION GAP SERPL CALCULATED.3IONS-SCNC: 3 MMOL/L (ref 3–14)
AST SERPL W P-5'-P-CCNC: 20 U/L (ref 0–45)
BASOPHILS # BLD AUTO: 0.1 10E9/L (ref 0–0.2)
BASOPHILS NFR BLD AUTO: 1.5 %
BILIRUB SERPL-MCNC: 0.4 MG/DL (ref 0.2–1.3)
BUN SERPL-MCNC: 18 MG/DL (ref 7–30)
CALCIUM SERPL-MCNC: 9 MG/DL (ref 8.5–10.1)
CHLORIDE SERPL-SCNC: 111 MMOL/L (ref 94–109)
CO2 SERPL-SCNC: 30 MMOL/L (ref 20–32)
CREAT SERPL-MCNC: 1 MG/DL (ref 0.52–1.04)
DEPRECATED CALCIDIOL+CALCIFEROL SERPL-MC: <64 UG/L (ref 20–75)
DIFFERENTIAL METHOD BLD: NORMAL
EOSINOPHIL # BLD AUTO: 0.1 10E9/L (ref 0–0.7)
EOSINOPHIL NFR BLD AUTO: 2.4 %
ERYTHROCYTE [DISTWIDTH] IN BLOOD BY AUTOMATED COUNT: 14.5 % (ref 10–15)
FERRITIN SERPL-MCNC: 30 NG/ML (ref 8–252)
GFR SERPL CREATININE-BSD FRML MDRD: 63 ML/MIN/{1.73_M2}
GLUCOSE SERPL-MCNC: 116 MG/DL (ref 70–99)
GLUCOSE SERPL-MCNC: 267 MG/DL (ref 70–99)
GLUCOSE SERPL-MCNC: 321 MG/DL (ref 70–99)
HBA1C MFR BLD: 6.7 % (ref 0–5.6)
HCT VFR BLD AUTO: 37.5 % (ref 35–47)
HGB BLD-MCNC: 11.8 G/DL (ref 11.7–15.7)
IMM GRANULOCYTES # BLD: 0 10E9/L (ref 0–0.4)
IMM GRANULOCYTES NFR BLD: 0.2 %
IRON SATN MFR SERPL: 25 % (ref 15–46)
IRON SERPL-MCNC: 71 UG/DL (ref 35–180)
LYMPHOCYTES # BLD AUTO: 1.6 10E9/L (ref 0.8–5.3)
LYMPHOCYTES NFR BLD AUTO: 29.6 %
MCH RBC QN AUTO: 29.5 PG (ref 26.5–33)
MCHC RBC AUTO-ENTMCNC: 31.5 G/DL (ref 31.5–36.5)
MCV RBC AUTO: 94 FL (ref 78–100)
MONOCYTES # BLD AUTO: 0.7 10E9/L (ref 0–1.3)
MONOCYTES NFR BLD AUTO: 12.2 %
NEUTROPHILS # BLD AUTO: 2.9 10E9/L (ref 1.6–8.3)
NEUTROPHILS NFR BLD AUTO: 54.1 %
NRBC # BLD AUTO: 0 10*3/UL
NRBC BLD AUTO-RTO: 0 /100
PLATELET # BLD AUTO: 330 10E9/L (ref 150–450)
POTASSIUM SERPL-SCNC: 4.3 MMOL/L (ref 3.4–5.3)
PREALB SERPL IA-MCNC: 16 MG/DL (ref 15–45)
PROT SERPL-MCNC: 6.8 G/DL (ref 6.8–8.8)
RBC # BLD AUTO: 4 10E12/L (ref 3.8–5.2)
SODIUM SERPL-SCNC: 144 MMOL/L (ref 133–144)
TIBC SERPL-MCNC: 285 UG/DL (ref 240–430)
VIT B12 SERPL-MCNC: 290 PG/ML (ref 193–986)
VITAMIN D2 SERPL-MCNC: <5 UG/L
VITAMIN D3 SERPL-MCNC: 59 UG/L
WBC # BLD AUTO: 5.3 10E9/L (ref 4–11)

## 2020-08-20 PROCEDURE — 84681 ASSAY OF C-PEPTIDE: CPT | Performed by: PEDIATRICS

## 2020-08-20 PROCEDURE — 84134 ASSAY OF PREALBUMIN: CPT | Performed by: PEDIATRICS

## 2020-08-20 PROCEDURE — 85025 COMPLETE CBC W/AUTO DIFF WBC: CPT | Performed by: PEDIATRICS

## 2020-08-20 PROCEDURE — 82607 VITAMIN B-12: CPT | Performed by: PEDIATRICS

## 2020-08-20 PROCEDURE — 82542 COL CHROMOTOGRAPHY QUAL/QUAN: CPT | Performed by: PEDIATRICS

## 2020-08-20 PROCEDURE — 84630 ASSAY OF ZINC: CPT | Mod: GZ | Performed by: PEDIATRICS

## 2020-08-20 PROCEDURE — 82747 ASSAY OF FOLIC ACID RBC: CPT | Performed by: PEDIATRICS

## 2020-08-20 PROCEDURE — 82947 ASSAY GLUCOSE BLOOD QUANT: CPT | Performed by: PEDIATRICS

## 2020-08-20 PROCEDURE — G0463 HOSPITAL OUTPT CLINIC VISIT: HCPCS

## 2020-08-20 PROCEDURE — 82728 ASSAY OF FERRITIN: CPT | Performed by: PEDIATRICS

## 2020-08-20 PROCEDURE — 80053 COMPREHEN METABOLIC PANEL: CPT | Performed by: PEDIATRICS

## 2020-08-20 PROCEDURE — 84446 ASSAY OF VITAMIN E: CPT | Performed by: PEDIATRICS

## 2020-08-20 PROCEDURE — 84590 ASSAY OF VITAMIN A: CPT | Performed by: PEDIATRICS

## 2020-08-20 PROCEDURE — 36415 COLL VENOUS BLD VENIPUNCTURE: CPT | Performed by: PEDIATRICS

## 2020-08-20 PROCEDURE — 83550 IRON BINDING TEST: CPT | Performed by: PEDIATRICS

## 2020-08-20 PROCEDURE — 82306 VITAMIN D 25 HYDROXY: CPT | Performed by: PEDIATRICS

## 2020-08-20 PROCEDURE — 83540 ASSAY OF IRON: CPT | Performed by: PEDIATRICS

## 2020-08-20 PROCEDURE — 83036 HEMOGLOBIN GLYCOSYLATED A1C: CPT | Performed by: PEDIATRICS

## 2020-08-20 ASSESSMENT — PAIN SCALES - GENERAL
PAINLEVEL: NO PAIN (0)
PAINLEVEL: NO PAIN (0)

## 2020-08-20 NOTE — PATIENT INSTRUCTIONS
1)  You are doing great!  Your A1c is 6.7% which I am happy with.  My only concern is that you are still at risk for hypoglycemia and did drop as low as 40s when you were busy and distracted.  I would recommend we go ahead with Dexcom G6.    2)  No changes to insulin dosing    3)  Consider probiotic while on augment for bowel opal       Follow up in 4 mos:  December 17 at 1:40

## 2020-08-20 NOTE — NURSING NOTE
Chief Complaint   Patient presents with     RECHECK     4 month f/u     Blood pressure (!) 147/83, pulse 76, weight 61.2 kg (134 lb 14.7 oz), last menstrual period 12/19/2013, SpO2 97 %, not currently breastfeeding.    Payal Joshua, CMA

## 2020-08-20 NOTE — LETTER
8/20/2020         RE: Lynn Thompson  [address] [facility] [address] [id number]        Dear Colleague,    Thank you for referring your patient, Lynn Thompson, to the [facility]. Please see a copy of my visit note below.    Administered Boost: 10:44AM    FBS: 116    Called lab with boost time :     11:45AM    12:45PM    Patient is aware and given time to return to lab.    Payal Joshua CMA        Again, thank you for allowing me to participate in the care of your patient.        Sincerely,        Adriana Robles MD

## 2020-08-20 NOTE — PROGRESS NOTES
Administered Boost:10:44am    FBS: 116    Called lab with boost time :     11:45am  12:45pm    Patient is aware and given time to return to lab.    Payal Joshua, CMA

## 2020-08-20 NOTE — PROGRESS NOTES
[facility]  Islet Autotransplant, Diabetes Follow Up    Problem List:  Patient Active Problem List   Diagnosis     Iron deficiency anemia secondary to inadequate dietary iron intake     Gastric bypass status for obesity     Health Care Home     Chronic pain syndrome     Exocrine pancreatic insufficiency     Asplenia     BLU (obstructive sleep apnea)     S/P exploratory laparotomy     Dysthymia     Anxiety     Thyroid nodule     Acquired hypothyroidism     Post-pancreatectomy diabetes (H)     E coli bacteremia     Antibiotic-associated diarrhea     Elevated LFTs     Fever       HPI:  Lynn is a 55 year old female here for follow up o total pancreatectomy, islet cell autotransplant, splenectomy, liver biopsy (needle core), choledochoduodenostomy, feeding jejunostomy performed on 5/10/2013.  At the time of the procedure, the patient received 178,100 IEQ, or 3,209 IEQ/kg body weight. She has had two subsequent exploratory laparatomy for small bowel obstruction. Other notable endocrine history includes a complex cystic nodule in mid right thyroid lobe with 1 cm maximum diameter (saw Dr Adorno in 11/2016) which needs annual follow up U/S in Nov 2017, and mild hypothyroidism followed by PCP.  She had an episode of cholangitis and was hospitalized for 4 days 8/24/17 (fevers, RUQ pain, + Ecoli blood cx).    She was on NO insulin at her 1 year, 2 year, 3 year, 4 year transplant anniversaries and restart insulin just after 4 years post-tx, insulin restart date of  6/6/2017.  She is continuing to wean narcotics, on a suboxone wean, now on a 1/8 patch (Suboxone 1- 0.5 mg film) daily, with no other narcotics.     At today's visit, overall Lynn is doing well overall.      1)  Diabetes:  Lynn continues to have partial islet graft function.  A1c is improved!  Now down to 6.7%. She has been more active and lost weight. She is in a good place in terms of her mental health. She clearly still  has post meal hyper/hypoglycemia (rapid post Boost spike seen today) c/w her physiology.  But she is managing well at home.  Eats about 3 small meals per day.  She had one episode of hypoglycemia to 48 mg/dL on a day that her foster dog was giving birth to puppies and had an emergency  procedure so she was more active and not eating that day.  She does not really get normal awareness of those lows until they are 50s to 40s. She is on MDI and prescribed to monitor 4 times daily so we discussed moving onto a G6 dexcom with her history, which she is interested in doing.   Diabetes history:  Current insulin regimen:    Lantus 6 units per day  Novolog 0.5 units per 10g, and correction scale of 0.5 u per 100 >150 (3 injections per day with meals)  TDD not reviewed        Recent A1c:   Lab Results   Component Value Date    A1C 6.7 2020    A1C 7.1 2020    A1C 6.9 2020    A1C 7.6 2019    A1C 8.5 2019         Hypoglycemia history:   Recent history as above.  The patient has had NO episodes of severe hypoglycemia (seizure, coma, or neuroglycopenic symptoms severe enough to require assistance from another person).  Blood sugars were reviewed from the patient records and/or the meter download.    Average B mg/dL range  mg/dL  Number of blood sugar checks per day 2.8 in this meter-- needs strips for 4 checks per day.  Prescribed to test 4 per day or more.    Patient is good candidate for CGM therapy.  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   --  Require frequent adjustments of the  insulin treatment regimen, based on therapeutic CGM test results      2)  GI symptoms/pain:  Continues to do very well!  She is off her supplements, but was on the ones listed below. She has diarrhea now as she is on Augmentin. She remains on her Viokce 35062 tabs 4-5 with meals, 2 with snacks.   Supplements  Dopatone Active  ImmunoG PRP  GI Synergy  Magnezyme (400 mg mag oxide)      NOT on suboxone.  Only pain meds are tylenol and ibuprofen.    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 Nely expected in January.  She is overall happy.    Pneumococcal vaccine booster in July.      Review of systems:  Review Of Systems  Skin: negative  Eyes: negative  Ears/Nose/Throat: negative  Respiratory: No shortness of breath, dyspnea on exertion, cough, or hemoptysis  Cardiovascular: negative  Gastrointestinal: as above- improved  Genitourinary: negative  Musculoskeletal: negative  Neurologic: negative  Psychiatric: anxiety/depression  Hematologic/Lymphatic/Immunologic: negative  Endocrine: as above    Past Medical and Surgical History:  Past Medical History:   Diagnosis Date     Abdominal pain, epigastric 11/05, ongoing; goes to pain clinic    probable recurrent spasm sphincter of Oddi causing biliary colic pains      Benign paroxysmal positional vertigo     occ.      Calculus of kidney 5/05    x1 on L side passed, several stones.  Has been tested for oxalate.     Chronic abdominal pain 7/17/2013     Chronic pancreatitis (H) 7/17/2013     Depressive disorder, not elsewhere classified     also occ panic spells     Diabetes (H)     post pancreatectomy     Dyspepsia and other specified disorders of function of stomach 6/99    H. pylori   treated     Headache(784.0)     still periodic HA's ;  often 5X/week     Hypertension 2/22/16    Stress related     Iron deficiency anemia secondary to inadequate dietary iron intake 11/03    relates to gastric bypass     Other chronic pain       Post-pancreatectomy diabetes (H) 2013     Pyelonephritis, unspecified , 10/8    L side     Regional enteritis of unspecified site     inacive/remission     Sleep apnea     uses splint     Spasm of sphincter of Oddi     ERCP with Stent of CBD by Fernandez @ [facility] with sx relief     Spasm of sphincter of Oddi     surgical + endoscopic stenting of pancreatic duct @ [facility] 06     Thyroid nodule 2016     Ureteral calculus 10/2/2012     Vaccination not carried out      Past Surgical History:   Procedure Laterality Date     APPENDECTOMY       C NONSPECIFIC PROCEDURE      R bunion     C NONSPECIFIC PROCEDURE      T & A     C NONSPECIFIC PROCEDURE      partial ileum resection     C NONSPECIFIC PROCEDURE      R ovarian cyst     C NONSPECIFIC PROCEDURE           C NONSPECIFIC PROCEDURE      GALL BLADDER     C NONSPECIFIC PROCEDURE      CBD stent; Dr. Presley     C NONSPECIFIC PROCEDURE   &     colonoscopy     C NONSPECIFIC PROCEDURE      Gastric bypass     CHOLECYSTECTOMY       COLONOSCOPY       COMBINED CYSTOSCOPY, RETROGRADES, URETEROSCOPY, LASER HOLMIUM LITHOTRIPSY URETER(S), INSERT STENT Right 3/23/2015    Procedure: COMBINED CYSTOSCOPY, RETROGRADES, URETEROSCOPY, LASER HOLMIUM LITHOTRIPSY URETER(S), INSERT STENT;  Surgeon: Kennedi Aldana MD;  Location: [facility]     COMBINED CYSTOSCOPY, RETROGRADES, URETEROSCOPY, LASER HOLMIUM LITHOTRIPSY URETER(S), INSERT STENT Right 2015    Procedure: COMBINED CYSTOSCOPY, RETROGRADES, URETEROSCOPY, LASER HOLMIUM LITHOTRIPSY URETER(S), INSERT STENT;  Surgeon: Kennedi Aldana MD;  Location: [facility] OR     COSMETIC SURGERY  2002    Tummy tuck     CYSTOSCOPY, RETROGRADES, INSERT STENT URETER(S), COMBINED  10/2/2012    Procedure: COMBINED CYSTOSCOPY, RETROGRADES, INSERT STENT URETER(S);  COMBINED CYSTOSCOPY,  , INSERT LEFT STENT URETER;  Surgeon: Johny Baez MD;  Location: [facility] OR     ENT  SURGERY       ESOPHAGOSCOPY, GASTROSCOPY, DUODENOSCOPY (EGD), COMBINED N/A 2018    Procedure: COMBINED ESOPHAGOSCOPY, GASTROSCOPY, DUODENOSCOPY (EGD);  ESOPHAGOSCOPY, GASTROSCOPY, DUODENOSCOPY (EGD)    ;  Surgeon: Tamir Rodgers MD;  Location: [facility] GI     EXTRACORPOREAL SHOCK WAVE LITHOTRIPSY (ESWL)  10/16/2012    Procedure: EXTRACORPOREAL SHOCK WAVE LITHOTRIPSY (ESWL);  left EXTRACORPOREAL SHOCK WAVE LITHOTRIPSY (ESWL) ;  Surgeon: Johny Baez MD;  Location: [facility] OR     GI SURGERY  2015    Small bowel obstruction     GI SURGERY      gastric bypass     HC LAP,LYSIS OF ADHESIONS       HERNIA REPAIR  2015     LAPAROSCOPIC LYSIS ADHESIONS N/A 2015    Procedure: LAPAROSCOPIC LYSIS ADHESIONS;  Surgeon: Aaron Early MD;  [facility]     LAPAROSCOPIC LYSIS ADHESIONS N/A 2015    Procedure: LAPAROSCOPIC LYSIS ADHESIONS;  Surgeon: Aaron Early MD;  [facility]     PANCREATECTOMY, TRANSPLANT AUTO ISLET CELL, COMBINED  5/10/2013    Procedure: COMBINED PANCREATECTOMY, TRANSPLANT AUTO ISLET CELL;  Pancreatectomy, Auto Islet Cell Transplant   hernia repair, jejunostomy tube and liver biopsies with Anesthesia General with block;  Surgeon: Aaron Early MD;  [facility]     REPAIR PTOSIS BROW BILATERAL Bilateral 2020    Procedure: BILATERAL BROW PTOSIS REPAIR;  Surgeon: Denise Alberts MD;  Location: [facility] OR     TRANSPLANT  5/10/13       Family History:  New changes since last visit:  none  Family History   Problem Relation Age of Onset     Family History Negative Mother      Respiratory Father         COPD;  at 69     Genitourinary Problems Father         kidney stones     Substance Abuse Father      Depression Father      Asthma Father      Heart Disease Paternal Grandfather         M.I.     Coronary Artery Disease Paternal Grandfather      Hyperlipidemia Paternal Grandfather      Genitourinary Problems Brother         multiple brothers with kidney stones      Gastrointestinal Disease Maternal Grandmother         undiagnosed 'gut' issues     Coronary Artery Disease Maternal Grandfather      Hyperlipidemia Maternal Grandfather      Cerebrovascular Disease Paternal Grandmother         At the age of 103     Anxiety Disorder Paternal Grandmother      Osteoporosis Paternal Grandmother      Anxiety Disorder Son      Anxiety Disorder Daughter      Asthma Daughter        Social History:  Social History     Social History Narrative     Not on file     Does not work currently.  Mom to many foster dogs     Physical Exam:  Vitals: BP (!) 147/83   Pulse 76   Wt 61.2 kg (134 lb 14.7 oz)   LMP 12/19/2013   SpO2 97%   BMI 23.16 kg/m    BMI= Body mass index is 23.16 kg/m .  General:  Appearance is normal, no acute distress  HEENT:  NC/AT, sclera appear white  Neck:  No obvious thyromegaly  CV/Lungs:  Non distressed breathing  Skin:  No apparent rashes.  Does have puncture wound on left forearm from dog bite  Neuro:  Normal mental status  Psych:  Normal affect        Assessment:  1.  Post pancreatectomy diabetes mellitus, s/p total pancreatectomy and islet autotransplant.    Lynn is a 55 year old with history of chronic pancreatitis who is s/p total pancreatectomy and islet autotransplant.  She was insulin independent until 6/6/2017 (just after 4 years post-tx) and now has partial islet function.    Today her A1c has improved and I am happy with her progress except that she does still have hypoglycemia, mostly controlled by diet, but dropped as low as 48 mg/dL without warning.  I would like to move her to a Dexcom G6.     Plan:  1.  Changes to current diabetes regimen:  Patient Instructions   1)  You are doing great!  Your A1c is 6.7% which I am happy with.  My only concern is that you are still at risk for hypoglycemia and did drop as low as 40s when you were busy and distracted.  I would recommend we go ahead with Dexcom G6.    2)  No changes to insulin dosing    3)  Consider  probiotic while on augment for bowel opal       Follow up in 4 mos:  December 17 at 1:40      2.  Frequency of blood sugar checks:  premeal and bedtime, and as needed for hypoglycemia (6 strip per day).    3.  Continue routine follow up for autoislet transplant patients:  Mixed meal test (6 mL/kg BoostHP to max of 360 mL) at 3 months, 6 months, and once a year post transplant.  Hemoglobin A1c levels at these time points and quarterly.    4.  Other issues addressed today:  As above    Follow up:  As above        Contact me for questions at [phone] or [phone].  Emergency number to reach pediatric endocrinology after hours is [phone].        Adriana Robles MD  , Pediatric Endocrinology and Diabetes  [facility] [place]  [facility]    I spent 25  minutes face to face with Lynn, with more than 50% of that time counseling on plan of care.

## 2020-08-20 NOTE — PROGRESS NOTES
Administered Boost: 10:44AM    FBS: 116    Called lab with boost time :     11:45AM    12:45PM    Patient is aware and given time to return to lab.    Payal Joshua, CMA

## 2020-08-21 LAB
C PEPTIDE SERPL-MCNC: 0.5 NG/ML (ref 0.9–6.9)
C PEPTIDE SERPL-MCNC: 1.5 NG/ML (ref 0.9–6.9)
C PEPTIDE SERPL-MCNC: 3.4 NG/ML (ref 0.9–6.9)
FOLATE RBC-MCNC: 238 NG/ML
HCT VFR BLD CALC: 37.5 %

## 2020-08-22 LAB
A-TOCOPHEROL VIT E SERPL-MCNC: 9.6 MG/L (ref 5.5–18)
ANNOTATION COMMENT IMP: NORMAL
BETA+GAMMA TOCOPHEROL SERPL-MCNC: 0.8 MG/L (ref 0–6)
RETINYL PALMITATE SERPL-MCNC: <0.02 MG/L (ref 0–0.1)
VIT A SERPL-MCNC: 0.35 MG/L (ref 0.3–1.2)

## 2020-08-25 DIAGNOSIS — E13.9 POST-PANCREATECTOMY DIABETES (H): Primary | ICD-10-CM

## 2020-08-25 DIAGNOSIS — Z90.410 POST-PANCREATECTOMY DIABETES (H): Primary | ICD-10-CM

## 2020-08-25 DIAGNOSIS — E89.1 POST-PANCREATECTOMY DIABETES (H): Primary | ICD-10-CM

## 2020-08-25 LAB
A-LINOLENATE SERPL-SCNC: 53 NMOL/ML (ref 50–130)
AA SERPL-SCNC: 1871 NMOL/ML (ref 520–1490)
ARACHIDATE SERPL-SCNC: 37 NMOL/ML (ref 50–90)
CLINICAL BIOCHEMIST REVIEW: ABNORMAL
DHA SERPL-SCNC: 210 NMOL/ML (ref 30–250)
DOCOSAPENTAENATE W6 SERPL-SCNC: 30 NMOL/ML (ref 10–70)
DOCOSATETRAENOATE SERPL-SCNC: 38 NMOL/ML (ref 10–80)
DOCOSENOATE SERPL-SCNC: 9 NMOL/ML (ref 4–13)
DPA SERPL-SCNC: 79 NMOL/ML (ref 20–210)
EPA SERPL-SCNC: 65 NMOL/ML (ref 14–100)
FA SERPL-SCNC: 13.2 MMOL/L (ref 7.3–16.8)
G-LINOLENATE SERPL-SCNC: 64 NMOL/ML (ref 16–150)
HEXADECENOATE SERPL-SCNC: 40 NMOL/ML (ref 25–105)
HOMO-G LINOLENATE SERPL-SCNC: 163 NMOL/ML (ref 50–250)
LAURATE SERPL-SCNC: 23 NMOL/ML (ref 6–90)
LINOLEATE SERPL-SCNC: 3565 NMOL/ML (ref 2270–3850)
MEAD ACID SERPL-SCNC: 58 NMOL/ML (ref 7–30)
MONOUNSAT FA SERPL-SCNC: 3.4 MMOL/L (ref 1.3–5.8)
MYRISTATE SERPL-SCNC: 141 NMOL/ML (ref 30–450)
NERVONATE SERPL-SCNC: 131 NMOL/ML (ref 60–100)
OCTADECANOATE SERPL-SCNC: 634 NMOL/ML (ref 590–1170)
OLEATE SERPL-SCNC: 2529 NMOL/ML (ref 650–3500)
PALMITATE SERPL-SCNC: 2585 NMOL/ML (ref 1480–3730)
PALMITOLEATE SERPL-SCNC: 387 NMOL/ML (ref 110–1130)
POLYUNSAT FA SERPL-SCNC: 6.2 MMOL/L (ref 3.2–5.8)
SAT FA SERPL-SCNC: 3.6 MMOL/L (ref 2.5–5.5)
TRIENOATE/AA SERPL-SRTO: 0.03 {RATIO} (ref 0.01–0.04)
VACCENATE SERPL-SCNC: 236 NMOL/ML (ref 280–740)
W3 FA SERPL-SCNC: 0.4 MMOL/L (ref 0.2–0.5)
W6 FA SERPL-SCNC: 5.7 MMOL/L (ref 3–5.4)

## 2020-08-25 RX ORDER — PROCHLORPERAZINE 25 MG/1
SUPPOSITORY RECTAL
Qty: 3 EACH | Refills: 11 | Status: SHIPPED | OUTPATIENT
Start: 2020-08-25 | End: 2021-08-19

## 2020-08-25 RX ORDER — PROCHLORPERAZINE 25 MG/1
SUPPOSITORY RECTAL
Qty: 1 EACH | Refills: 3 | Status: SHIPPED | OUTPATIENT
Start: 2020-08-25 | End: 2021-08-19

## 2020-08-25 RX ORDER — PROCHLORPERAZINE 25 MG/1
SUPPOSITORY RECTAL
Qty: 1 DEVICE | Refills: 1 | Status: SHIPPED | OUTPATIENT
Start: 2020-08-25 | End: 2021-08-19

## 2020-08-26 LAB — ZINC SERPL-MCNC: 58 UG/DL (ref 60–120)

## 2020-09-10 ENCOUNTER — MEDICAL CORRESPONDENCE (OUTPATIENT)
Dept: HEALTH INFORMATION MANAGEMENT | Facility: CLINIC | Age: 55
End: 2020-09-10

## 2020-09-14 ENCOUNTER — MYC REFILL (OUTPATIENT)
Dept: FAMILY MEDICINE | Facility: CLINIC | Age: 55
End: 2020-09-14

## 2020-09-14 DIAGNOSIS — E89.1 POST-PANCREATECTOMY DIABETES (H): ICD-10-CM

## 2020-09-14 DIAGNOSIS — Z90.410 POST-PANCREATECTOMY DIABETES (H): ICD-10-CM

## 2020-09-14 DIAGNOSIS — Z13.220 LIPID SCREENING: Primary | ICD-10-CM

## 2020-09-14 DIAGNOSIS — E13.9 POST-PANCREATECTOMY DIABETES (H): ICD-10-CM

## 2020-09-14 DIAGNOSIS — E03.9 ACQUIRED HYPOTHYROIDISM: ICD-10-CM

## 2020-09-15 RX ORDER — LEVOTHYROXINE SODIUM 100 UG/1
100 TABLET ORAL DAILY
Qty: 90 TABLET | Refills: 0 | Status: SHIPPED | OUTPATIENT
Start: 2020-09-15 | End: 2020-12-30

## 2020-09-15 NOTE — TELEPHONE ENCOUNTER
Patient has an lab appt on 9/16 according to the nurse she needed a lab appointment. Can you place the order for patient?  Onelia Rivas

## 2020-09-15 NOTE — TELEPHONE ENCOUNTER
Medication is being filled for 1 time refill only due to:  Patient needs to be seen because follow up visit is due. .     Ada Trevizo RN Flex

## 2020-09-16 NOTE — TELEPHONE ENCOUNTER
Called patient and rescheduled her lab appointment for tomorrow so she can do all 3 test. Onelia Rivas

## 2020-09-17 DIAGNOSIS — E13.9 POST-PANCREATECTOMY DIABETES (H): ICD-10-CM

## 2020-09-17 DIAGNOSIS — E03.9 ACQUIRED HYPOTHYROIDISM: ICD-10-CM

## 2020-09-17 DIAGNOSIS — Z90.410 POST-PANCREATECTOMY DIABETES (H): ICD-10-CM

## 2020-09-17 DIAGNOSIS — Z13.220 LIPID SCREENING: ICD-10-CM

## 2020-09-17 DIAGNOSIS — E89.1 POST-PANCREATECTOMY DIABETES (H): ICD-10-CM

## 2020-09-17 LAB
CHOLEST SERPL-MCNC: 192 MG/DL
HDLC SERPL-MCNC: 91 MG/DL
LDLC SERPL CALC-MCNC: 84 MG/DL
NONHDLC SERPL-MCNC: 101 MG/DL
TRIGL SERPL-MCNC: 83 MG/DL
TSH SERPL DL<=0.005 MIU/L-ACNC: 3.04 MU/L (ref 0.4–4)

## 2020-09-17 PROCEDURE — 84443 ASSAY THYROID STIM HORMONE: CPT | Performed by: FAMILY MEDICINE

## 2020-09-17 PROCEDURE — 36415 COLL VENOUS BLD VENIPUNCTURE: CPT | Performed by: FAMILY MEDICINE

## 2020-09-17 PROCEDURE — 80061 LIPID PANEL: CPT | Performed by: FAMILY MEDICINE

## 2020-10-21 ENCOUNTER — TRANSFERRED RECORDS (OUTPATIENT)
Dept: HEALTH INFORMATION MANAGEMENT | Facility: CLINIC | Age: 55
End: 2020-10-21

## 2020-10-27 ENCOUNTER — HOSPITAL ENCOUNTER (EMERGENCY)
Facility: CLINIC | Age: 55
Discharge: HOME | End: 2020-10-27
Attending: EMERGENCY MEDICINE | Admitting: EMERGENCY MEDICINE
Payer: MEDICARE

## 2020-10-27 VITALS
TEMPERATURE: 97 F | RESPIRATION RATE: 18 BRPM | OXYGEN SATURATION: 99 % | DIASTOLIC BLOOD PRESSURE: 82 MMHG | WEIGHT: 130 LBS | BODY MASS INDEX: 22.31 KG/M2 | SYSTOLIC BLOOD PRESSURE: 151 MMHG | HEART RATE: 72 BPM

## 2020-10-27 DIAGNOSIS — S61.452A DOG BITE OF LEFT HAND, INITIAL ENCOUNTER: ICD-10-CM
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
## 2020-10-27 PROCEDURE — 99283 EMERGENCY DEPT VISIT LOW MDM: CPT

## 2020-10-27 PROCEDURE — 250N000013 HC RX MED GY IP 250 OP 250 PS 637: Mod: GY | Performed by: EMERGENCY MEDICINE

## 2020-10-27 RX ORDER — ACETAMINOPHEN 500 MG
1000 TABLET ORAL ONCE
Status: COMPLETED | OUTPATIENT
Start: 2020-10-27 | End: 2020-10-27

## 2020-10-27 RX ORDER — FLUCONAZOLE 150 MG/1
TABLET ORAL
Qty: 2 TABLET | Refills: 0 | Status: ON HOLD | OUTPATIENT
Start: 2020-10-27 | End: 2020-10-31

## 2020-10-27 RX ADMIN — ACETAMINOPHEN 1000 MG: 500 TABLET, FILM COATED ORAL at 09:45

## 2020-10-27 ASSESSMENT — ENCOUNTER SYMPTOMS
WOUND: 1
NUMBNESS: 0
FEVER: 0
CHILLS: 0
WEAKNESS: 0

## 2020-10-27 NOTE — ED AVS SNAPSHOT
[facility]  [address]  [facility] [address]  Phone: [phone]  Fax: [phone]                                    Lynn Thompson   MRN: [id number]    Department: [facility]   Date of Visit: 10/27/2020           After Visit Summary Signature Page    I have received my discharge instructions, and my questions have been answered. I have discussed any challenges I see with this plan with the nurse or doctor.    ..........................................................................................................................................  Patient/Patient Representative Signature      ..........................................................................................................................................  Patient Representative Print Name and Relationship to Patient    ..................................................               ................................................  Date                                   Time    ..........................................................................................................................................  Reviewed by Signature/Title    ...................................................              ..............................................  Date                                               Time          22EPIC Rev 08/18

## 2020-10-27 NOTE — ED PROVIDER NOTES
History   Chief Complaint:  Dog Bite    HPI   Lynn Thompson is a 55 year old female, last tetanus booster , with a history of diabetes and s/p auto islet cell transplant in 2013 who presents for an evaluation of a dog bite. The patient reports that she was bitten on her left hand by her vaccinated dog earlier this morning when it was playing was her daughter's dog. She is still able to bend her fingers and hand. She denies being immunocompromised, fever, chills, numbness, or tingling.     Allergies:  Corticosteroids  Povidone iodine  Naproxen  Nsaids  Sulfasalazine      Medications:    Viokace  Cymbalta  Romycin  Lexapro  Novolog   Lantus   Synthroid/Levothroid  Provigil  Prilosec  Zofran  Desyrel  Narcan  Zantac  Neurontin  Atarax  Movantik  Catapres   Vistaril     Past Medical History:    Benign paroxysmal positional vertigo  Chronic abdominal pain  Chronic pancreatitis  Depression  Diabetes  Dyspepsia  Hypertension  Iron deficiency anemia  Pyelonephritis  Regional enteritis   Obstructive sleep apnea  Spasm of sphincter of Oddi  Thyroid nodule  Uretal calculus   Chronic pain syndrome   Exocrine pancreatic insufficiency  Asplenia  Dysthymia  Anxiety  Hypothyroidism   E. Coli bacteremia  PTSD     Past Surgical History:    Appendectomy  Cholecystectomy  Combined cystoscopy, retrogrades, ureteroscopy, laser holmium lithotripsy ureter(s) (x3)  Cosmetic surgery (tummy tuck)  ENT surgery  EGD  ESWL  Small bowel obstruction surgery  Gastric bypass (x2)  HC lap, lysis of adhesions  Laparoscopic lysis adhesins (x2)  Pancreatectomy, transplant auto islet cell  Repair ptosis brown bilateral  R bunionectomy  Tonsillectomy  Adenoidectomy  Partial ileum resection  R ovarian cystectomy    Gall bladder  CBD stent    Family History:    COPD  Kidney stones  Substance abuse  Depression  Asthma  Anxiety disorder    Social History:  Smoking status: Never   Alcohol use: No  Drug use: Never  PCP: Maryana Shaver  [facility]  Marital Status:   [2]     Review of Systems   Constitutional: Negative for chills and fever.   Skin: Positive for wound (Left hand dog bite).   Allergic/Immunologic: Negative for immunocompromised state.   Neurological: Negative for weakness and numbness.   All other systems reviewed and are negative.      Physical Exam     Patient Vitals for the past 24 hrs:   BP Temp Temp src Pulse Resp SpO2 Weight   10/27/20 0844 (!) 151/82 97  F (36.1  C) Temporal 72 18 99 % 59 kg (130 lb)       Physical Exam  General: the patient is awake and interactive  HEENT:  Moist mucous membranes, conjunctiva normal  Pulmonary:  Normal respiratory effort  Cardiovascular:  Well perfused  Musculoskeletal:  Moving 4 extremities grossly wnl, no deformities  Left hand:  Able to flex/extend all digits; Able to flex all fingers at MCP/PCP/DIP and keep fingers in extension against resistance.  Sensation intact to light touch to median/ulnar/radial nerve distributions; 2 point discrimination < 5 mm; 1.5 cm gaping laceration to the dorsum of mid left hand.  Small punctate laceration at flexor surface of volar aspect proximal phalanx of 2nd finger  Neuro:  Speech normal, no focal deficits  Psych:  Normal affect              Emergency Department Course   Interventions:  0945: Tylenol 1000 mg PO    Emergency Department Course:  Past medical records, nursing notes, and vitals reviewed.    0907 I performed an exam of the patient as documented above.     Findings and plan explained to the patient. Patient discharged home with instructions regarding supportive care, medications, and reasons to return. The importance of close follow-up was reviewed. The patient was prescribed Augmentin and Diflucan    I personally answered all related questions prior to discharge.     Impression & Plan   Medical Decision Making:  Lynn Thompson is a 55 year old female who presents to the ER for evaluation of dog bite to the left hand.  Please see HPI for  specifics.  She is CMS intact to the left hand.  No visible or palpable foreign body and I have very low suspicion for this.  No physical exam evidence to suggest neurovascular or tendon compromise at this time.  Discussed options for treatment and together we decided to allow the wound to heal by secondary intention given increased risk of infection with primary closure.  Tetanus status is up-to-date.  Wound was irrigated by ER tech with normal saline irrigation.  No imaging or labs indicated at this time.  We will start the patient on Augmentin and have her follow-up with PCP in a week for wound recheck.  She will continue with twice daily dressing changes with bacitracin.  Discussed reasons to return to the ER with the patient who was understanding and in agreement.  All questions were answered prior to discharge.    Diagnosis:    ICD-10-CM    1. Dog bite of left hand, initial encounter  S61.452A     W54.0XXA        Disposition:  Discharged to home.    Discharge Medications:  Discharge Medication List as of 10/27/2020  9:40 AM      START taking these medications    Details   !! amoxicillin-clavulanate (AUGMENTIN) 875-125 MG tablet Take 1 tablet by mouth 2 times daily for 10 days, Disp-20 tablet, R-0, Local Print       !! - Potential duplicate medications found. Please discuss with provider.          Scribe Disclosure:  I, Zaida Velasquez, am serving as a scribe at 8:59 AM on 10/27/2020 to document services personally performed by Dr. Zara MD based on my observations and the provider's statements to me.   I, Ravinder Baugh, am serving as a scribe at 10:42 AM on 10/27/2020 to document services personally performed by J Carlos Zuñiga MD based on my observations and the provider's statements to me.         J Carlos Zuñiga MD  10/27/20 0339

## 2020-10-28 ENCOUNTER — HOSPITAL ENCOUNTER (INPATIENT)
Facility: CLINIC | Age: 55
LOS: 2 days | Discharge: HOME | DRG: 579 | End: 2020-10-31
Attending: EMERGENCY MEDICINE | Admitting: INTERNAL MEDICINE
Payer: MEDICARE
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
## 2020-10-28 DIAGNOSIS — L03.90 CELLULITIS, UNSPECIFIED CELLULITIS SITE: ICD-10-CM

## 2020-10-28 LAB
BASOPHILS # BLD AUTO: 0.1 10E9/L (ref 0–0.2)
BASOPHILS NFR BLD AUTO: 0.6 %
DIFFERENTIAL METHOD BLD: ABNORMAL
EOSINOPHIL # BLD AUTO: 0.2 10E9/L (ref 0–0.7)
EOSINOPHIL NFR BLD AUTO: 1.3 %
ERYTHROCYTE [DISTWIDTH] IN BLOOD BY AUTOMATED COUNT: 13.8 % (ref 10–15)
HCT VFR BLD AUTO: 35 % (ref 35–47)
HGB BLD-MCNC: 10.7 G/DL (ref 11.7–15.7)
IMM GRANULOCYTES # BLD: 0 10E9/L (ref 0–0.4)
IMM GRANULOCYTES NFR BLD: 0.2 %
LACTATE BLD-SCNC: 0.7 MMOL/L (ref 0.7–2)
LYMPHOCYTES # BLD AUTO: 2.8 10E9/L (ref 0.8–5.3)
LYMPHOCYTES NFR BLD AUTO: 22.4 %
MCH RBC QN AUTO: 29.1 PG (ref 26.5–33)
MCHC RBC AUTO-ENTMCNC: 30.6 G/DL (ref 31.5–36.5)
MCV RBC AUTO: 95 FL (ref 78–100)
MONOCYTES # BLD AUTO: 1.5 10E9/L (ref 0–1.3)
MONOCYTES NFR BLD AUTO: 12.4 %
NEUTROPHILS # BLD AUTO: 7.8 10E9/L (ref 1.6–8.3)
NEUTROPHILS NFR BLD AUTO: 63.1 %
NRBC # BLD AUTO: 0 10*3/UL
NRBC BLD AUTO-RTO: 0 /100
PLATELET # BLD AUTO: 290 10E9/L (ref 150–450)
RBC # BLD AUTO: 3.68 10E12/L (ref 3.8–5.2)
WBC # BLD AUTO: 12.3 10E9/L (ref 4–11)

## 2020-10-28 PROCEDURE — 96375 TX/PRO/DX INJ NEW DRUG ADDON: CPT

## 2020-10-28 PROCEDURE — 85025 COMPLETE CBC W/AUTO DIFF WBC: CPT | Performed by: EMERGENCY MEDICINE

## 2020-10-28 PROCEDURE — 99285 EMERGENCY DEPT VISIT HI MDM: CPT | Mod: 25

## 2020-10-28 PROCEDURE — 83605 ASSAY OF LACTIC ACID: CPT | Performed by: EMERGENCY MEDICINE

## 2020-10-28 PROCEDURE — 80053 COMPREHEN METABOLIC PANEL: CPT | Performed by: EMERGENCY MEDICINE

## 2020-10-28 PROCEDURE — 87040 BLOOD CULTURE FOR BACTERIA: CPT | Performed by: EMERGENCY MEDICINE

## 2020-10-28 PROCEDURE — 250N000011 HC RX IP 250 OP 636: Performed by: EMERGENCY MEDICINE

## 2020-10-28 RX ORDER — HYDROMORPHONE HYDROCHLORIDE 1 MG/ML
0.5 INJECTION, SOLUTION INTRAMUSCULAR; INTRAVENOUS; SUBCUTANEOUS
Status: DISCONTINUED | OUTPATIENT
Start: 2020-10-28 | End: 2020-10-29

## 2020-10-28 RX ADMIN — HYDROMORPHONE HYDROCHLORIDE 0.5 MG: 1 INJECTION, SOLUTION INTRAMUSCULAR; INTRAVENOUS; SUBCUTANEOUS at 23:35

## 2020-10-29 ENCOUNTER — APPOINTMENT (OUTPATIENT)
Dept: GENERAL RADIOLOGY | Facility: CLINIC | Age: 55
DRG: 579 | End: 2020-10-29
Attending: PHYSICIAN ASSISTANT
Payer: MEDICARE

## 2020-10-29 PROBLEM — L03.90 CELLULITIS, UNSPECIFIED CELLULITIS SITE: Status: ACTIVE | Noted: 2020-10-29
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
## 2020-10-29 LAB
ALBUMIN SERPL-MCNC: 2.9 G/DL (ref 3.4–5)
ALP SERPL-CCNC: 95 U/L (ref 40–150)
ALT SERPL W P-5'-P-CCNC: 25 U/L (ref 0–50)
ANION GAP SERPL CALCULATED.3IONS-SCNC: 6 MMOL/L (ref 3–14)
ANION GAP SERPL CALCULATED.3IONS-SCNC: 7 MMOL/L (ref 3–14)
AST SERPL W P-5'-P-CCNC: 18 U/L (ref 0–45)
BASOPHILS # BLD AUTO: 0.1 10E9/L (ref 0–0.2)
BASOPHILS NFR BLD AUTO: 0.7 %
BILIRUB SERPL-MCNC: 0.3 MG/DL (ref 0.2–1.3)
BUN SERPL-MCNC: 23 MG/DL (ref 7–30)
BUN SERPL-MCNC: 28 MG/DL (ref 7–30)
CALCIUM SERPL-MCNC: 8 MG/DL (ref 8.5–10.1)
CALCIUM SERPL-MCNC: 8.4 MG/DL (ref 8.5–10.1)
CHLORIDE SERPL-SCNC: 109 MMOL/L (ref 94–109)
CHLORIDE SERPL-SCNC: 109 MMOL/L (ref 94–109)
CO2 SERPL-SCNC: 27 MMOL/L (ref 20–32)
CO2 SERPL-SCNC: 27 MMOL/L (ref 20–32)
CREAT SERPL-MCNC: 0.86 MG/DL (ref 0.52–1.04)
CREAT SERPL-MCNC: 1.16 MG/DL (ref 0.52–1.04)
DIFFERENTIAL METHOD BLD: ABNORMAL
EOSINOPHIL # BLD AUTO: 0.2 10E9/L (ref 0–0.7)
EOSINOPHIL NFR BLD AUTO: 2.3 %
ERYTHROCYTE [DISTWIDTH] IN BLOOD BY AUTOMATED COUNT: 13.9 % (ref 10–15)
GFR SERPL CREATININE-BSD FRML MDRD: 52 ML/MIN/{1.73_M2}
GFR SERPL CREATININE-BSD FRML MDRD: 75 ML/MIN/{1.73_M2}
GLUCOSE BLDC GLUCOMTR-MCNC: 104 MG/DL (ref 70–99)
GLUCOSE BLDC GLUCOMTR-MCNC: 275 MG/DL (ref 70–99)
GLUCOSE BLDC GLUCOMTR-MCNC: 65 MG/DL (ref 70–99)
GLUCOSE BLDC GLUCOMTR-MCNC: 69 MG/DL (ref 70–99)
GLUCOSE BLDC GLUCOMTR-MCNC: 76 MG/DL (ref 70–99)
GLUCOSE SERPL-MCNC: 115 MG/DL (ref 70–99)
GLUCOSE SERPL-MCNC: 84 MG/DL (ref 70–99)
HCT VFR BLD AUTO: 33.4 % (ref 35–47)
HGB BLD-MCNC: 10.3 G/DL (ref 11.7–15.7)
IMM GRANULOCYTES # BLD: 0 10E9/L (ref 0–0.4)
IMM GRANULOCYTES NFR BLD: 0.2 %
LYMPHOCYTES # BLD AUTO: 2.1 10E9/L (ref 0.8–5.3)
LYMPHOCYTES NFR BLD AUTO: 25.6 %
MCH RBC QN AUTO: 29.3 PG (ref 26.5–33)
MCHC RBC AUTO-ENTMCNC: 30.8 G/DL (ref 31.5–36.5)
MCV RBC AUTO: 95 FL (ref 78–100)
MONOCYTES # BLD AUTO: 1.1 10E9/L (ref 0–1.3)
MONOCYTES NFR BLD AUTO: 13.7 %
NEUTROPHILS # BLD AUTO: 4.7 10E9/L (ref 1.6–8.3)
NEUTROPHILS NFR BLD AUTO: 57.5 %
NRBC # BLD AUTO: 0 10*3/UL
NRBC BLD AUTO-RTO: 0 /100
PLATELET # BLD AUTO: 269 10E9/L (ref 150–450)
POTASSIUM SERPL-SCNC: 3.5 MMOL/L (ref 3.4–5.3)
POTASSIUM SERPL-SCNC: 3.7 MMOL/L (ref 3.4–5.3)
PROT SERPL-MCNC: 6.5 G/DL (ref 6.8–8.8)
RBC # BLD AUTO: 3.52 10E12/L (ref 3.8–5.2)
SARS-COV-2 RNA SPEC QL NAA+PROBE: NOT DETECTED
SODIUM SERPL-SCNC: 142 MMOL/L (ref 133–144)
SODIUM SERPL-SCNC: 143 MMOL/L (ref 133–144)
SPECIMEN SOURCE: NORMAL
WBC # BLD AUTO: 8.2 10E9/L (ref 4–11)

## 2020-10-29 PROCEDURE — 250N000013 HC RX MED GY IP 250 OP 250 PS 637: Performed by: INTERNAL MEDICINE

## 2020-10-29 PROCEDURE — 73130 X-RAY EXAM OF HAND: CPT | Mod: LT

## 2020-10-29 PROCEDURE — U0003 INFECTIOUS AGENT DETECTION BY NUCLEIC ACID (DNA OR RNA); SEVERE ACUTE RESPIRATORY SYNDROME CORONAVIRUS 2 (SARS-COV-2) (CORONAVIRUS DISEASE [COVID-19]), AMPLIFIED PROBE TECHNIQUE, MAKING USE OF HIGH THROUGHPUT TECHNOLOGIES AS DESCRIBED BY CMS-2020-01-R: HCPCS | Performed by: EMERGENCY MEDICINE

## 2020-10-29 PROCEDURE — 258N000003 HC RX IP 258 OP 636: Performed by: INTERNAL MEDICINE

## 2020-10-29 PROCEDURE — 250N000011 HC RX IP 250 OP 636: Performed by: INTERNAL MEDICINE

## 2020-10-29 PROCEDURE — 99222 1ST HOSP IP/OBS MODERATE 55: CPT | Mod: AI | Performed by: INTERNAL MEDICINE

## 2020-10-29 PROCEDURE — C9803 HOPD COVID-19 SPEC COLLECT: HCPCS

## 2020-10-29 PROCEDURE — 80048 BASIC METABOLIC PNL TOTAL CA: CPT | Performed by: INTERNAL MEDICINE

## 2020-10-29 PROCEDURE — 96376 TX/PRO/DX INJ SAME DRUG ADON: CPT

## 2020-10-29 PROCEDURE — 36415 COLL VENOUS BLD VENIPUNCTURE: CPT | Performed by: INTERNAL MEDICINE

## 2020-10-29 PROCEDURE — 85025 COMPLETE CBC W/AUTO DIFF WBC: CPT | Performed by: INTERNAL MEDICINE

## 2020-10-29 PROCEDURE — 120N000006 HC R&B PEDS

## 2020-10-29 PROCEDURE — 250N000011 HC RX IP 250 OP 636: Performed by: EMERGENCY MEDICINE

## 2020-10-29 PROCEDURE — 96365 THER/PROPH/DIAG IV INF INIT: CPT

## 2020-10-29 PROCEDURE — 999N001017 HC STATISTIC GLUCOSE BY METER IP

## 2020-10-29 RX ORDER — NICOTINE POLACRILEX 4 MG
15-30 LOZENGE BUCCAL
Status: DISCONTINUED | OUTPATIENT
Start: 2020-10-29 | End: 2020-10-31 | Stop reason: HOSPADM

## 2020-10-29 RX ORDER — ACETAMINOPHEN 325 MG/1
650 TABLET ORAL EVERY 4 HOURS PRN
Status: DISCONTINUED | OUTPATIENT
Start: 2020-10-29 | End: 2020-10-30

## 2020-10-29 RX ORDER — ONDANSETRON 4 MG/1
4 TABLET, ORALLY DISINTEGRATING ORAL EVERY 6 HOURS PRN
Status: DISCONTINUED | OUTPATIENT
Start: 2020-10-29 | End: 2020-10-31 | Stop reason: HOSPADM

## 2020-10-29 RX ORDER — GABAPENTIN 300 MG/1
300 CAPSULE ORAL AT BEDTIME
Status: DISCONTINUED | OUTPATIENT
Start: 2020-10-29 | End: 2020-10-31 | Stop reason: HOSPADM

## 2020-10-29 RX ORDER — POLYETHYLENE GLYCOL 3350 17 G/17G
17 POWDER, FOR SOLUTION ORAL DAILY PRN
Status: DISCONTINUED | OUTPATIENT
Start: 2020-10-29 | End: 2020-10-31 | Stop reason: HOSPADM

## 2020-10-29 RX ORDER — ONDANSETRON 2 MG/ML
4 INJECTION INTRAMUSCULAR; INTRAVENOUS EVERY 6 HOURS PRN
Status: DISCONTINUED | OUTPATIENT
Start: 2020-10-29 | End: 2020-10-31 | Stop reason: HOSPADM

## 2020-10-29 RX ORDER — NALOXONE HYDROCHLORIDE 0.4 MG/ML
.1-.4 INJECTION, SOLUTION INTRAMUSCULAR; INTRAVENOUS; SUBCUTANEOUS
Status: DISCONTINUED | OUTPATIENT
Start: 2020-10-29 | End: 2020-10-31 | Stop reason: HOSPADM

## 2020-10-29 RX ORDER — ONDANSETRON 8 MG/1
8 TABLET, ORALLY DISINTEGRATING ORAL 3 TIMES DAILY PRN
Status: ON HOLD | COMMUNITY
End: 2021-11-24

## 2020-10-29 RX ORDER — FAMOTIDINE 20 MG/1
40 TABLET, FILM COATED ORAL 2 TIMES DAILY PRN
Status: DISCONTINUED | OUTPATIENT
Start: 2020-10-29 | End: 2020-10-31 | Stop reason: HOSPADM

## 2020-10-29 RX ORDER — DEXTROSE MONOHYDRATE 25 G/50ML
25-50 INJECTION, SOLUTION INTRAVENOUS
Status: DISCONTINUED | OUTPATIENT
Start: 2020-10-29 | End: 2020-10-31 | Stop reason: HOSPADM

## 2020-10-29 RX ORDER — LACTOBACILLUS RHAMNOSUS GG 10B CELL
1 CAPSULE ORAL DAILY
COMMUNITY
End: 2021-04-22

## 2020-10-29 RX ORDER — DEXTROSE MONOHYDRATE 25 G/50ML
25-50 INJECTION, SOLUTION INTRAVENOUS
Status: DISCONTINUED | OUTPATIENT
Start: 2020-10-29 | End: 2020-10-30

## 2020-10-29 RX ORDER — LACTOBACILLUS RHAMNOSUS GG 10B CELL
1 CAPSULE ORAL DAILY
Status: DISCONTINUED | OUTPATIENT
Start: 2020-10-29 | End: 2020-10-31 | Stop reason: HOSPADM

## 2020-10-29 RX ORDER — TRAZODONE HYDROCHLORIDE 50 MG/1
50 TABLET, FILM COATED ORAL
Status: DISCONTINUED | OUTPATIENT
Start: 2020-10-29 | End: 2020-10-31 | Stop reason: HOSPADM

## 2020-10-29 RX ORDER — ESCITALOPRAM OXALATE 20 MG/1
20 TABLET ORAL DAILY
Status: DISCONTINUED | OUTPATIENT
Start: 2020-10-29 | End: 2020-10-31 | Stop reason: HOSPADM

## 2020-10-29 RX ORDER — NICOTINE POLACRILEX 4 MG
15-30 LOZENGE BUCCAL
Status: DISCONTINUED | OUTPATIENT
Start: 2020-10-29 | End: 2020-10-30

## 2020-10-29 RX ORDER — LIDOCAINE 40 MG/G
CREAM TOPICAL
Status: DISCONTINUED | OUTPATIENT
Start: 2020-10-29 | End: 2020-10-31 | Stop reason: HOSPADM

## 2020-10-29 RX ORDER — LEVOTHYROXINE SODIUM 100 UG/1
100 TABLET ORAL DAILY
Status: DISCONTINUED | OUTPATIENT
Start: 2020-10-29 | End: 2020-10-31 | Stop reason: HOSPADM

## 2020-10-29 RX ORDER — HYDROCODONE BITARTRATE AND ACETAMINOPHEN 5; 325 MG/1; MG/1
1-2 TABLET ORAL EVERY 4 HOURS PRN
Status: DISCONTINUED | OUTPATIENT
Start: 2020-10-29 | End: 2020-10-30

## 2020-10-29 RX ORDER — SODIUM CHLORIDE 9 MG/ML
INJECTION, SOLUTION INTRAVENOUS CONTINUOUS
Status: DISCONTINUED | OUTPATIENT
Start: 2020-10-29 | End: 2020-10-30

## 2020-10-29 RX ORDER — DULOXETIN HYDROCHLORIDE 20 MG/1
20 CAPSULE, DELAYED RELEASE ORAL DAILY
Status: DISCONTINUED | OUTPATIENT
Start: 2020-10-29 | End: 2020-10-31 | Stop reason: HOSPADM

## 2020-10-29 RX ORDER — DULOXETIN HYDROCHLORIDE 20 MG/1
20 CAPSULE, DELAYED RELEASE ORAL DAILY
COMMUNITY

## 2020-10-29 RX ORDER — SODIUM CHLORIDE 9 MG/ML
INJECTION, SOLUTION INTRAVENOUS CONTINUOUS
Status: DISCONTINUED | OUTPATIENT
Start: 2020-10-29 | End: 2020-10-29

## 2020-10-29 RX ORDER — GABAPENTIN 300 MG/1
300 CAPSULE ORAL AT BEDTIME
Status: ON HOLD | COMMUNITY
End: 2024-08-23

## 2020-10-29 RX ORDER — TRAZODONE HYDROCHLORIDE 50 MG/1
100 TABLET, FILM COATED ORAL AT BEDTIME
COMMUNITY

## 2020-10-29 RX ORDER — DEXTROSE, SODIUM CHLORIDE, SODIUM LACTATE, POTASSIUM CHLORIDE, AND CALCIUM CHLORIDE 5; .6; .31; .03; .02 G/100ML; G/100ML; G/100ML; G/100ML; G/100ML
INJECTION, SOLUTION INTRAVENOUS CONTINUOUS
Status: DISCONTINUED | OUTPATIENT
Start: 2020-10-29 | End: 2020-10-29

## 2020-10-29 RX ORDER — TRAZODONE HYDROCHLORIDE 50 MG/1
50 TABLET, FILM COATED ORAL AT BEDTIME
Status: DISCONTINUED | OUTPATIENT
Start: 2020-10-29 | End: 2020-10-31 | Stop reason: HOSPADM

## 2020-10-29 RX ORDER — LANOLIN ALCOHOL/MO/W.PET/CERES
3 CREAM (GRAM) TOPICAL
Status: DISCONTINUED | OUTPATIENT
Start: 2020-10-29 | End: 2020-10-31 | Stop reason: HOSPADM

## 2020-10-29 RX ADMIN — SODIUM CHLORIDE: 9 INJECTION, SOLUTION INTRAVENOUS at 23:24

## 2020-10-29 RX ADMIN — LEVOTHYROXINE SODIUM 100 MCG: 0.1 TABLET ORAL at 11:09

## 2020-10-29 RX ADMIN — HYDROCODONE BITARTRATE AND ACETAMINOPHEN 1 TABLET: 5; 325 TABLET ORAL at 21:26

## 2020-10-29 RX ADMIN — TAZOBACTAM SODIUM AND PIPERACILLIN SODIUM 3.38 G: 375; 3 INJECTION, SOLUTION INTRAVENOUS at 15:55

## 2020-10-29 RX ADMIN — HYDROCODONE BITARTRATE AND ACETAMINOPHEN 1 TABLET: 5; 325 TABLET ORAL at 12:52

## 2020-10-29 RX ADMIN — ESCITALOPRAM OXALATE 20 MG: 20 TABLET ORAL at 11:09

## 2020-10-29 RX ADMIN — TAZOBACTAM SODIUM AND PIPERACILLIN SODIUM 3.38 G: 375; 3 INJECTION, SOLUTION INTRAVENOUS at 00:11

## 2020-10-29 RX ADMIN — DULOXETINE HYDROCHLORIDE 20 MG: 20 CAPSULE, DELAYED RELEASE ORAL at 11:07

## 2020-10-29 RX ADMIN — SODIUM CHLORIDE: 9 INJECTION, SOLUTION INTRAVENOUS at 11:07

## 2020-10-29 RX ADMIN — SODIUM CHLORIDE, SODIUM LACTATE, POTASSIUM CHLORIDE, CALCIUM CHLORIDE AND DEXTROSE MONOHYDRATE: 5; 600; 310; 30; 20 INJECTION, SOLUTION INTRAVENOUS at 17:19

## 2020-10-29 RX ADMIN — TAZOBACTAM SODIUM AND PIPERACILLIN SODIUM 3.38 G: 375; 3 INJECTION, SOLUTION INTRAVENOUS at 08:55

## 2020-10-29 RX ADMIN — HYDROCODONE BITARTRATE AND ACETAMINOPHEN 1 TABLET: 5; 325 TABLET ORAL at 02:49

## 2020-10-29 RX ADMIN — HYDROMORPHONE HYDROCHLORIDE 0.5 MG: 1 INJECTION, SOLUTION INTRAMUSCULAR; INTRAVENOUS; SUBCUTANEOUS at 00:49

## 2020-10-29 RX ADMIN — Medication 1 CAPSULE: at 11:07

## 2020-10-29 RX ADMIN — SODIUM CHLORIDE: 9 INJECTION, SOLUTION INTRAVENOUS at 02:08

## 2020-10-29 RX ADMIN — HYDROCODONE BITARTRATE AND ACETAMINOPHEN 1 TABLET: 5; 325 TABLET ORAL at 09:01

## 2020-10-29 RX ADMIN — GABAPENTIN 300 MG: 300 CAPSULE ORAL at 21:26

## 2020-10-29 RX ADMIN — HYDROCODONE BITARTRATE AND ACETAMINOPHEN 1 TABLET: 5; 325 TABLET ORAL at 17:18

## 2020-10-29 RX ADMIN — ONDANSETRON 4 MG: 2 INJECTION INTRAMUSCULAR; INTRAVENOUS at 10:55

## 2020-10-29 RX ADMIN — Medication 1 TABLET: at 11:08

## 2020-10-29 RX ADMIN — OMEPRAZOLE 40 MG: 20 CAPSULE, DELAYED RELEASE ORAL at 11:07

## 2020-10-29 RX ADMIN — TRAZODONE HYDROCHLORIDE 50 MG: 50 TABLET ORAL at 21:26

## 2020-10-29 RX ADMIN — OMEPRAZOLE 40 MG: 20 CAPSULE, DELAYED RELEASE ORAL at 20:41

## 2020-10-29 RX ADMIN — PANCRELIPASE 5 TABLET: 20880; 78300; 78300 TABLET ORAL at 12:52

## 2020-10-29 ASSESSMENT — ENCOUNTER SYMPTOMS
FEVER: 0
NUMBNESS: 0
CHILLS: 0
COLOR CHANGE: 1
WOUND: 1

## 2020-10-29 NOTE — PROGRESS NOTES
Cross coverage.    Andrew Fowler MD requested me to reevaluate patient's hand.    Patient was admitted at around midnight for the left hand infection after dog bite.    When I saw the patient, she appeared well and nontoxic.  Pain was not any significantly worse.  But she has noticed that the redness has slightly gone beyond the skin marking, proximal to the wrist joint.  No fever.  No leukocytosis.    On exam:  Left hand:  Patient has redness and swelling of the dorsal aspect of the left hand.  Small laceration of about 2 cm noted, due to the bite, proximal to the MTP joint of the index finger.  Fingertips are slightly cool to touch.  But capillary refill time less than 3 seconds.  Similar temperature as the other hand.  Normal sensation.  She is able to move all the fingers, limited by pain.  Unable to make a fist due to the pain.  Passive movement: I was able to passively flex all her fingers fairly well except the index finger.  Similarly, significant pain with the extension of the index finger.    No issues with the wrist joint.  Full movement without any pain.  The redness is extending slightly above the skin marking, probably 1 cm above the skin marking.  Able to palpate the radial artery without issue.    -Does not appear to have any vascular or neurological issue.  -But there is a concern of ongoing infection with possible involvement of the extensor tendon of the index finger.  I spoke to the orthopedic PA on the phone who the nurse had paged earlier.  She had my examination findings and concerned.  She will talk to the surgeon on call and come up with a plan.  Currently, the patient is planned for surgery in the morning.  -Keep the patient n.p.o. for now.  But can resume the diet if no plans for surgery tonight.  -Blood glucose of 65 earlier today, start D5 LR.  -Discussed the plan with the patient and her  as well.

## 2020-10-29 NOTE — CONSULTS
ID consult dictated IMp 1 54 yo female dog bite related L hand infection    REc zosyn, IV longer, ortho following

## 2020-10-29 NOTE — ED PROVIDER NOTES
History     Chief Complaint:  Dog Bite and Wound Check    HPI   Lynn Thompson is a 55 year old female who presents for evaluation of dog bite wound to her left hand. The patient states that she was bit by her dog yesterday morning. The dog's vaccinations are up to date. She was seen in the ED yesterday where she was started on Augmentin. She returns today for worsening redness and swelling. She denies fever, chills or loss of sensation. The patient reports history of sepsis secondary to kidney infection and chronic pancreatitis.     The patient's tetanus is up to date ().    Allergies:  Corticosteroids  Povidone Iodine  Naproxen  NSAIDs   Sulfasalazine     Medications:    Viokace   Cymbalta  Lexapro   Pepcid   Diflucan  Neurontin  Atarax   Insulin (Novolog, Lantus)   Levothyroxine  Prilosec  Provigil  Zofran    Past Medical History:    Vertigo   Kidney stones  Chronic pain  Chronic pancreatitis   Depression  Diabetes   Dyspepsia  Hypertension   Anemia  Pyelonephritis   Sleep apnea  Spasm of sphincter of Oddi   Thyroid nodule   Ureteral calculus   Gastric bypass status   E.coli bacteremia   Asplenia   Hypothyroidism   Exploratory laparotomy    Past Surgical History:    Appendectomy   Cholecystectomy   Colonoscopy   Combined cystoscopy, right x2  Tummy tuck   Cystoscopy   ENT surgery   EGD   Extracorporeal shock wave lithotripsy   Small bowel obstruction  Gastric bypass  Colonoscopy    section   Ovarian cystectomy   Tonsillectomy and adenoidectomy    Bunionectomy, right  Ptosis brow bilateral   Pancreatectomy, transplant auto islet cell   Lysis adhesions, laparoscopic x2  Hernia repair    Family History:    COPD - father   Kidney stones - father, brother  Substance abuse - father   Depression - father   Asthma - father, daughter  Anxiety - son, daughter    Social History:  The patient was unaccompanied to the ED.  Smoking Status: Never Smoker  Smokeless Tobacco: Never Used  Alcohol Use: No  Marital  Status:        Review of Systems   Constitutional: Negative for chills and fever.   Skin: Positive for color change and wound.   Neurological: Negative for numbness.   All other systems reviewed and are negative.        Physical Exam     Patient Vitals for the past 24 hrs:   BP Temp Temp src Pulse SpO2   10/29/20 0100 (!) 143/73 -- -- 55 94 %   10/29/20 0030 (!) 145/72 -- -- 52 96 %   10/29/20 0000 (!) 154/84 -- -- 55 96 %   10/28/20 2029 (!) 172/84 98.7  F (37.1  C) Temporal 69 96 %       Physical Exam    Constitutional:  Oriented to person, place, and time. Non-toxic appearing.  HENT:   Head:    Normocephalic.   Mouth/Throat:   Oropharynx is clear and moist.   Eyes:    EOM are normal. Pupils are equal, round, and reactive to light.   Neck:    Neck supple.   Cardiovascular:  Normal rate, regular rhythm and normal heart sounds.      Exam reveals no gallop and no friction rub.       No murmur heard.  Pulmonary/Chest:  Effort normal and breath sounds normal.      No respiratory distress. No wheezes. No rales.      No reproducible chest wall pain.  Abdominal:   Soft. No distension. No tenderness. No rebound and no guarding.   Musculoskeletal:  Normal range of motion.   Neurological:   Alert and oriented to person, place, and time.           Moves all 4 extremities spontaneously    Skin:    No rash noted. No pallor.      2 cm abrasion noted in the central dorsal aspect of the hand.     Surrounding erythema that is streaking up the proximal wrist.      Non-circumferential.  No purulence.     Emergency Department Course     Laboratory:  Laboratory findings were communicated with the patient who voiced understanding of the findings.    CBC: WBC 12.3 (H), HGB 10.7 (L),     CMP: Glucose 115 (H), Creatinine 1.16 (H), eGFR 52 (L), Calcium 8.4 (L), Albumin 2.9 (L), Protein total 6.5 (L) o/w WNL    Lactic Acid (Collected 2332): 0.7     Blood culture x2: pending    COVID-19 by PCR:  pending    Interventions:  2335 Dilaudid, 0.5 mg, IV   0011 Zosyn, 3.375 g, IV   0049 Dilaudid, 0.5 mg, IV    Emergency Department Course:     Nursing notes and vitals reviewed.    2240 I performed an exam of the patient as documented above.     2332 IV was inserted and blood was drawn for laboratory testing, results above.    0009 I spoke with Dr. Anton of the hospitalist service regarding patient's presentation, findings, and plan of care.    0035 The patient's nose was swabbed and this sample was sent for COVID testing, findings above.     0035 Findings and plan explained to the Patient who consents to admission. Discussed the patient with Dr. Anton, who will admit the patient to a [facility] bed for further monitoring, evaluation, and treatment.    Impression & Plan      Medical Decision Making:  Lynn Thompson is a 55 year old female who came in yesterday for dog bite. Bite was from her dog, fully vaccinated, during provoked incident with another dog. She was appropriately discharged home on Augmentin. She returns for increased redness and swelling consistent with cellulitis secondary to dog bite. Lab work is otherwise reassuring. As she failed outpatient management, she will require IV antibiotics. Initial dose of Zosyn has been given and patient will be admitted for further IV antibiotics management.    Covid-19  Lynn Thompson was evaluated during a global COVID-19 pandemic, which necessitated consideration that the patient might be at risk for infection with the SARS-CoV-2 virus that causes COVID-19.   Applicable protocols for evaluation were followed during the patient's care.   COVID-19 was considered as part of the patient's evaluation. The plan for testing is:  a test was obtained during this visit.    Diagnosis:    ICD-10-CM   1. Dog bite, initial encounter  W54.0XXA   2. Cellulitis, unspecified cellulitis site  L03.90       Disposition:   Patient is admitted to the care of Dr. Anton.    Gonzalo  Disclosure:  I, Alia Harperhenna, am serving as a scribe at 11:10 PM on 10/28/2020 to document services personally performed by Demian Hauser MD based on my observations and the provider's statements to me.  [facility] EMERGENCY DEPT       Demian Hauser MD  10/29/20 0416

## 2020-10-29 NOTE — UTILIZATION REVIEW
Admission Status; Secondary Review Determination       Under the authority of the Utilization Management Committee, the utilization review process indicated a secondary review on the above patient. The review outcome is based on review of the medical records, discussions with staff, and applying clinical experience noted on the date of the review.     (x) Inpatient Status Appropriate - This patient's medical care is consistent with medical management for inpatient care and reasonable inpatient medical practice.     RATIONALE FOR DETERMINATION   This is a 54 yo female with insulin dependent diabetes s/p pancreatic islet cell transplant, h/o splenectomy, Crohn's disease s/p ileal resection, BLU, among other problems who suffered a dog bite on 10/27. Seen in ED and discharged on Augmentin. Hand swelling and redness continued to worsen so returned to ED and now admitted for left hand cellulitis and concern for abscess. Orthopedics and Infectious disease consultations obtained. Patient recommended to remain on IV zosyn. Current plan is for I&D of the hand tomorrow.     Due to her islet cell transplant status and s/p splenectomy she is considered immunocompromised. Therefore, she meets inpatient criteria for treatment of cellulitis.     At the time of admission with the information available to the attending physician more than 2 nights hospital complex care was anticipated, based on patient risk of adverse outcome if treated as outpatient and complex care required. Inpatient admission is appropriate based on the Medicare guidelines.     This document was produced using voice recognition software.    The information on this document is developed by the utilization review team in order for the business office to ensure compliance. This only denotes the appropriateness of proper admission status and does not reflect the quality of care rendered.   The definitions of Inpatient Status and Observation Status used in making the  determination above are those provided in the CMS Coverage Manual, Chapter 1 and Chapter 6, section 70.4.     Sincerely,   Yady Dobson MD  Utilization Review  Physician Advisor  [facility].

## 2020-10-29 NOTE — ED NOTES
[facility]  ED Nurse Handoff Report    Lynn Thompson is a 55 year old female   ED Chief complaint: Dog Bite and Wound Check  . ED Diagnosis:   Final diagnoses:   Dog bite, initial encounter   Cellulitis, unspecified cellulitis site     Allergies:   Allergies   Allergen Reactions     Corticosteroids Other (See Comments)     All oral, IV and injectable steroids are contraindicated (unless in life threatening situations) in Islet Auto transplant recipients. They can cause irreversible loss of islet cell function. Please contact patient's transplant care coordinator, Erlinda Multani RN BSN at [phone]/pager: [phone] and endocrinologist prior to administration.       Povidone Iodine Hives     Causes skin to blister     Naproxen      Other reaction(s): Abdominal Pain  Pt allergic to Naprosyn     Nsaids      naprosyn = GI upset     Povidone Iodine      blisters     Sulfasalazine Nausea and Nausea and Vomiting       Code Status: Full Code  Activity level - Baseline/Home:  Independent. Activity Level - Current:   Stand by Assist. Lift room needed: No. Bariatric: No   Needed: No   Isolation: No. Infection: Not Applicable.     Vital Signs:   Vitals:    10/28/20 2029 10/29/20 0000 10/29/20 0030 10/29/20 0100   BP: (!) 172/84 (!) 154/84 (!) 145/72 (!) 143/73   Pulse: 69 55 52 55   Temp: 98.7  F (37.1  C)      TempSrc: Temporal      SpO2: 96% 96% 96% 94%       Cardiac Rhythm:  ,      Pain level: 0-10 Pain Scale: 3  Patient confused: No. Patient Falls Risk: Yes.   Elimination Status: Due to void   Patient Report - Initial Complaint: Wound check. Focused Assessment: Skin- Bit my own dog yesterday in left hand. Seen in ED, given PO abx, but erythema, pain and swelling have worsened. Puncture wound to top of hand. Hand elevated on pillow.   Tests Performed: Labs. Abnormal Results:   Labs Ordered and Resulted from Time of ED Arrival Up to the Time of Departure from the ED   CBC WITH PLATELETS  DIFFERENTIAL - Abnormal; Notable for the following components:       Result Value    WBC 12.3 (*)     RBC Count 3.68 (*)     Hemoglobin 10.7 (*)     MCHC 30.6 (*)     Absolute Monocytes 1.5 (*)     All other components within normal limits   COMPREHENSIVE METABOLIC PANEL - Abnormal; Notable for the following components:    Glucose 115 (*)     Creatinine 1.16 (*)     GFR Estimate 52 (*)     GFR Estimate If Black 60 (*)     Calcium 8.4 (*)     Albumin 2.9 (*)     Protein Total 6.5 (*)     All other components within normal limits   LACTIC ACID WHOLE BLOOD   COVID-19 VIRUS (CORONAVIRUS) BY PCR   PULSE OXIMETRY NURSING   PERIPHERAL IV CATHETER   CARDIAC CONTINUOUS MONITORING   STRICT INTAKE AND OUTPUT   BLOOD CULTURE   BLOOD CULTURE     .   Treatments provided: 0.5 mg dilaudid x 2, 3.375 mg zosyn  Family Comments: N/A  OBS brochure/video discussed/provided to patient:  N/A  ED Medications:   Medications   sodium chloride (PF) 0.9% PF flush 3 mL (has no administration in time range)   sodium chloride (PF) 0.9% PF flush 3 mL (3 mLs Intracatheter Given 10/28/20 8205)   HYDROmorphone (PF) (DILAUDID) injection 0.5 mg (0.5 mg Intravenous Given 10/29/20 0049)   piperacillin-tazobactam (ZOSYN) infusion 3.375 g (0 g Intravenous Stopped 10/29/20 0048)     Drips infusing:  No  For the majority of the shift, the patient's behavior Green. Interventions performed were N/A.    Sepsis treatment initiated: No     Patient tested for COVID 19 prior to admission: YES    ED Nurse Name/Phone Number: Nakita Leonard RN,   1:09 AM  RECEIVING UNIT ED HANDOFF REVIEW    Above ED Nurse Handoff Report was reviewed: Yes  Reviewed by: Sandra Nunez RN on October 29, 2020 at 1:25 AM

## 2020-10-29 NOTE — PHARMACY-ADMISSION MEDICATION HISTORY
Admission medication history interview status for this patient is complete. See [facility] admission navigator for allergy information, prior to admission medications and immunization status.     Medication history interview done via telephone during Covid-19 pandemic, indicate source(s): Patient  Medication history resources (including written lists, pill bottles, clinic record): [facility] dispense records  Pharmacy: [id number] [id number] [address] [phone]    Changes made to PTA medication list:  Added: Ca-Vit D; Fiber; Probiotic;   Deleted: Estradiol-norethindrone;   Changed:   1. Duloxetine 30mg BID --> 20mg daily  2. Gabapentin 300mg HS --> 300mg HS PRN  3. Novolog 1 unit per 15g carbs --> 0.5 units per 10g carbs; updated sliding scale to 0.5 units per 100mg/dL > 150mg/dL  4. Ondansetron 4mg TID PRN --> 8mg TID PRN  5. Trazodone 50mg HS --> 50mg HS PRN    Actions taken by pharmacist (provider contacted, etc): Obey JEAN-BAPTISTE to re-review PTA medication list.     Additional medication history information: Patient uses Novopen Hugo at home to administer half-unit doses of insulin.     Medication reconciliation/reorder completed by provider prior to medication history?  YES     For patients on insulin therapy:   Do you use sliding scale insulin based on blood sugars? YES  What is your pre-meal insulin coverage?  0.5 units per 10g carbs  Do you typically eat three meals a day?   How many times do you check your blood glucose per day? 3+  How many episodes of hypoglycemia do you typically have per month? Several  Do you have a Continuous Glucose Monitor (CGM)?  No - awaiting PA approval    Prior to Admission medications    Medication Sig Last Dose Taking? Auth Provider   acetaminophen (TYLENOL) 325 MG tablet Take 325 mg by mouth every 6 hours as needed for pain   Yes Unknown, Entered By History   amoxicillin-clavulanate (AUGMENTIN) 875-125 MG tablet Take 1 tablet by mouth 2 times daily for 10 days  10/28/2020 at x2 Yes J Carlos Zuñiga MD   amylase-lipase-protease (VIOKACE) 00435 units TABS tablet Take 4-5 with meals and 2 with snacks 10/28/2020 at Unknown time Yes Adriana Robles MD   amylase-lipase-protease (VIOKACE) 87531 units TABS tablet Take 2 capsules by mouth Take with snacks or supplements 10/28/2020 at Unknown time Yes Unknown, Entered By History   blood glucose (BILL MICROLET 2) lancing device Use to test blood sugars 6 times daily or as directed.  Yes Adriana Robles MD   blood glucose monitoring (BILL CONTOUR NEXT) test strip Check BS 4-6 times a day  Yes Adriana Robles MD   calcium carbonate 600 mg-vitamin D 400 units (CALTRATE) 600-400 MG-UNIT per tablet Take 1 tablet by mouth daily 10/28/2020 at Unknown time Yes Unknown, Entered By History   DULoxetine (CYMBALTA) 20 MG capsule Take 20 mg by mouth daily 10/28/2020 at AM Yes Unknown, Entered By History   escitalopram (LEXAPRO) 20 MG tablet Take 20 mg by mouth daily 10/28/2020 at AM Yes Unknown, Entered By History   estradiol (ESTRACE) 0.1 MG/GM vaginal cream PLACE 2 GRAMS VAGINALLY TWICE A WEEK 10/25/2020 Yes Kavitha Spencer DO   famotidine (PEPCID) 40 MG tablet Take 1 tablet (40 mg) by mouth 2 times daily as needed for heartburn Past Week at Unknown time Yes Maryana Brooks MD   fluconazole (DIFLUCAN) 150 MG tablet Take one tablet now, and one tablet in three days 10/27/2020 Yes J Carlos Zuñiga MD   gabapentin (NEURONTIN) 300 MG capsule Take 300 mg by mouth nightly as needed 10/27/2020 Yes Unknown, Entered By History   glucose 40 % GEL Take 15-30 g by mouth every 15 minutes as needed.  Yes Suzi Short APRN CNP   hydrOXYzine (ATARAX) 25 MG tablet TAKE 1-2 TABLETS BY MOUTH 2 TIMES DAILY AS NEEDED FOR ANXIETY  Yes Reported, Patient   insulin aspart (NOVOLOG PEN) 100 UNIT/ML pen Inject Subcutaneous 3 times daily (with meals) 0.5 units per 10g carbohydrates 10/28/2020 at Unknown time Yes Unknown, Entered By  History   insulin aspart (NOVOLOG PEN) 100 UNIT/ML pen Inject Subcutaneous 3 times daily (with meals) Sliding scale in addition to meal coverage:  0.5 unit(s) for every 100mg/dL above 150mg/dL 10/28/2020 at Unknown time Yes Unknown, Entered By History   insulin glargine (LANTUS PEN) 100 UNIT/ML pen Inject 6 Units Subcutaneous daily (with dinner) 10/28/2020 at PM Yes Unknown, Entered By History   lactobacillus rhamnosus, GG, (CULTURELL) capsule Take 1 capsule by mouth daily 10/28/2020 at Unknown time Yes Unknown, Entered By History   levothyroxine (SYNTHROID/LEVOTHROID) 100 MCG tablet Take 1 tablet (100 mcg) by mouth daily 10/28/2020 at AM Yes Ravinder Kendall MD   loratadine (CLARITIN) 10 MG tablet Take 10 mg by mouth daily as needed   Yes Unknown, Entered By History   methylcellulose (CITRUCEL) 500 MG TABS tablet Take 500 mg by mouth daily 10/28/2020 at Unknown time Yes Unknown, Entered By History   modafinil (PROVIGIL) 200 MG tablet Take 400 mg by mouth daily 10/28/2020 at AM Yes Unknown, Entered By History   omeprazole (PRILOSEC) 40 MG DR capsule Take 1 capsule (40 mg) by mouth 2 times daily Take 30-60 minutes before a meal. 10/28/2020 at x2 Yes Maryana Brooks MD   ondansetron (ZOFRAN-ODT) 8 MG ODT tab Take 8 mg by mouth 3 times daily as needed for nausea 10/28/2020 at Unknown time Yes Unknown, Entered By History   Sharps Container MISC For insulin needles  Yes Suzi Short APRN CNP   traZODone (DESYREL) 50 MG tablet Take 50 mg by mouth nightly as needed for sleep 10/27/2020 Yes Unknown, Entered By History   UNABLE TO FIND MEDICATION NAME: Dopatone Active 10/28/2020 at Unknown time Yes Reported, Patient   UNABLE TO FIND MEDICATION NAME: magnezyme 10/28/2020 at Unknown time Yes Reported, Patient   UNABLE TO FIND MEDICATION NAME: immunoG PRP 10/28/2020 at Unknown time Yes Reported, Patient   UNABLE TO FIND MEDICATION NAME: GI-Synergy 10/28/2020 at Unknown time Yes Reported, Patient   Continuous  Blood Gluc  (DEXCOM G6 ) MODESTA Use as directed to check blood glucose levels  at PA not approved joycelyn  Adriana Robles MD   Continuous Blood Gluc Sensor (DEXCOM G6 SENSOR) MISC Change every 10 days   Adriana Robles MD   Continuous Blood Gluc Transmit (DEXCOM G6 TRANSMITTER) MISC Change every 3 months   Adriana Robles MD   insulin pen needle (BD MAHESH U/F) 32G X 4 MM Use up to 3 times daily as needed for insulin dosing   Adriana Robles MD

## 2020-10-29 NOTE — PLAN OF CARE
Temperature max 99.5 oral. Left hand elevated on 2 pillows. Back of left hand is swollen, red, hot to touch and painful. Rated pain as 6-8. Norco given and appeared to sleep. Able to slowly flex half way and extend fingers. Denies numbness or tingling. Capillary refill is brisk. Strong radial pulse. Redness marked on hand and wrist. FSG was 76 Ate a popsicle and cracker. Slight nausea. Zosyn q8h. Monitor CMS q4h. Medicate for pain. Strict I&O. AM lab draw. Ortho consult.

## 2020-10-29 NOTE — CONSULTS
Consult Date:  10/29/2020      INFECTIOUS DISEASE CONSULTATION      ROOM:  250       IMPRESSION:   1.  A 55-year-old female with dog bite-related left hand infection, fairly significant looking, mild sepsis, failed oral Augmentin.  Not clear whether any significant tenosynovitis.   2.  History of exocrine pancreatic insufficiency, status post pancreatic islet cell transplant.   3.  Obstructive sleep apnea.   4.  Mild renal insufficiency.   5.  Prior history of gram-negative blas bacteremia 3 years ago, with concern there might be some issues with her biliary duct, but has not had recurrent episodes.      RECOMMENDATIONS:   1.  Continue Zosyn for now, not oral ready yet.   2.  Orthopedics following for now, watching, further imaging and intervention as hand directs.   3.  Presumptively oral antibiotics when she goes if she does have tenosynovitis, but not oral ready yet.      HISTORY OF PRESENT ILLNESS:  This is 55-year-old female is seen in consultation due to a dog bite-related left hand infection.  The patient has a history of a prior exocrine pancreatic insufficiency islet cell transplant.  She has had a pancreatectomy.  Unclear to me whether spleen was removed.  She had a prior bacteremia episode I saw her for in 2017, raising concern about abdominal issues, but none since that time.  She got bit by her own dog.  Was seen as an outpatient.  Nothing really to clean out.  Was put on oral Augmentin, but soon thereafter started having worsening, now admitted on IV antibiotics.  Has some mild low-grade fever symptoms, but no other focal pain site.      PAST MEDICAL HISTORY:  The prior islet cell transplant and exocrine pancreas insufficiency, diabetes has been controlled, history of prior gram-negative blas bacteremia in 2017.      ALLERGIES:  NO ANTIMICROBIAL ALLERGIES.      MEDICATIONS:  As listed.      SOCIAL AND FAMILY HISTORY:  No recent travel or exposures.  The dog is hers and being watched and has been  vaccinated.      REVIEW OF SYSTEMS:  Otherwise unremarkable, minimal systemic symptoms.      PHYSICAL EXAMINATION:   GENERAL:  The patient appears her stated age.  She does not look toxic or ill.   VITAL SIGNS:  Currently normal, including being afebrile.   HEENT:  No significant lesions.   NECK:  Supple and nontender.   HEART AND LUNGS:  Unremarkable.   ABDOMEN:  Soft and nontender.   EXTREMITIES:  The left hand is unwrapped.  Can move all of her fingers, but fairly significantly swollen, warm to the touch.  No obvious fluctuance.      LABORATORY DATA:  Creatinine 1.16.  White count 12,300.        X-ray of the hand negative.  No advanced imaging yet.      Thank you very much for the consultation.  I will follow the patient with you.         GARY VALVERDE MD             D: 10/29/2020   T: 10/29/2020   MT: BAL      Name:     ANGEL CARPENTER   MRN:      [id number]        Account:       [id number]   :      1965           Consult Date:  10/29/2020      Document: [id number]       cc: Maryana Brooks MD

## 2020-10-29 NOTE — H&P
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     Patient reports that this happened on the morning of 10/27.  It appears that her dog was playing with her daughter's dog, when her dog bit her.  She reports that her dog is up-to-date on vaccines, and is not showing any abnormal signs.  She was seen in the ER after this and was sent home with a course of Augmentin.  However, since that time she has noticed the redness and swelling to be getting more worse.  She still able to move her fingers.  Her sensations have been intact.  Denies any fevers, chills, nausea, vomiting.  Otherwise she feels well.  She reports that her tetanus is up-to-date as well.  Given the failure of outpatient antibiotics, admission to the hospital was requested.     #Left hand cellulitis 2/2 dog bite.  In a diabetic patient.  At this point patient's neurovascular status is intact.  Her hemodynamics are stable.  Nevertheless given the failure of outpatient antibiotics, and her underlying diabetes, with diffuse history of auto islet transplant, will admit the patient for IV antibiotics.  She will be on IV Zosyn.  Monitor temperature curve, white cell count.  There is no obvious drainage or any abscess at this point.  Will request hand surgery to evaluate     #Mildly elevated Cr to 1.16. Baseline around 0.9-1. Continue IVF overnight. Check again in morning.      Chronic medical conditions  #BLU -Resume nightly CPAP  #Exocrine  pancreatic insufficiency s/p pancreatic auto islet cell transplant 2013   #Post pancreatectomy IDDM. A1C 6.7. On low dose Lantus at baseline. Will monitor sugars in hospital. If start to become elevated, then add SSI  #Hypothyroidism. synthroid  #GERD. Prilosec  #Chronic pain  #MDD    Diet:   Moderate consistent carb   DVT Prophylaxis: Ambulate every shift  Gonzalez Catheter: not present  COVID-19 Status. Asymptomatic testing pending     Patient's baseline home medications will need to be resumed once the prior to admission medication list has been verified by pharmacy     Code Status: Full Code    Disposition: 2 - 3 days, recommended to prior living arrangement once antibiotic plan established.    Plan of care was discussed with the patient in great detail. All of the questions were answered extensively. Patient is comfortable with the plan and agrees with it.     Covid-19  Lynn Thompson was evaluated during a global COVID-19 pandemic, and applicable protocols for evaluation were followed during the patient's care.      Tenzin Anton    Chief Complaint   Dog bite     History is obtained from the patient     Case discussed with ER provider Dr. Demian Hauser    History of Present Illness   Lynn Thompson is a 55 year old female who presents with dog bite. Details of HPI as above    Past Medical History    I have reviewed this patient's medical history and updated it with pertinent information if needed.   Past Medical History:   Diagnosis Date     Abdominal pain, epigastric 11/05, ongoing; goes to pain clinic    probable recurrent spasm sphincter of Oddi causing biliary colic pains      Benign paroxysmal positional vertigo     occ.      Calculus of kidney 5/05    x1 on L side passed, several stones.  Has been tested for oxalate.     Chronic abdominal pain 7/17/2013     Chronic pancreatitis (H) 7/17/2013     Depressive disorder, not elsewhere classified     also occ panic spells     Diabetes (H)     post  pancreatectomy     Dyspepsia and other specified disorders of function of stomach 6/99    H. 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  Past Surgical History:   Procedure Laterality Date     APPENDECTOMY  1990     CHOLECYSTECTOMY       COLONOSCOPY       COMBINED CYSTOSCOPY, RETROGRADES, URETEROSCOPY, LASER HOLMIUM LITHOTRIPSY URETER(S), INSERT STENT Right 3/23/2015    Procedure: COMBINED CYSTOSCOPY, RETROGRADES, URETEROSCOPY, LASER HOLMIUM LITHOTRIPSY URETER(S), INSERT STENT;  Surgeon: Kennedi Aldana MD;  Location: [facility] OR     COMBINED CYSTOSCOPY, RETROGRADES, URETEROSCOPY, LASER HOLMIUM LITHOTRIPSY URETER(S), INSERT STENT Right 4/20/2015    Procedure: COMBINED CYSTOSCOPY, RETROGRADES, URETEROSCOPY, LASER HOLMIUM LITHOTRIPSY URETER(S), INSERT STENT;  Surgeon: Kennedi Aldana MD;  Location: [facility] OR     COSMETIC SURGERY  6/24/2002    Tummy tuck     CYSTOSCOPY, RETROGRADES, INSERT STENT URETER(S), COMBINED  10/2/2012    Procedure: COMBINED CYSTOSCOPY, RETROGRADES, INSERT STENT URETER(S);  COMBINED CYSTOSCOPY,  , INSERT LEFT STENT URETER;  Surgeon: Johny Baez MD;  Location: [facility] OR      ENT SURGERY       ESOPHAGOSCOPY, GASTROSCOPY, DUODENOSCOPY (EGD), COMBINED N/A 2018    Procedure: COMBINED ESOPHAGOSCOPY, GASTROSCOPY, DUODENOSCOPY (EGD);  ESOPHAGOSCOPY, GASTROSCOPY, DUODENOSCOPY (EGD)    ;  Surgeon: Tamir Rodgers MD;  Location: [facility] GI     EXTRACORPOREAL SHOCK WAVE LITHOTRIPSY (ESWL)  10/16/2012    Procedure: EXTRACORPOREAL SHOCK WAVE LITHOTRIPSY (ESWL);  left EXTRACORPOREAL SHOCK WAVE LITHOTRIPSY (ESWL) ;  Surgeon: Johny Baez MD;  Location: [facility] OR     GI SURGERY  2015    Small bowel obstruction     GI SURGERY      gastric bypass     HC LAP,LYSIS OF ADHESIONS       HERNIA REPAIR  2015     LAPAROSCOPIC LYSIS ADHESIONS N/A 2015    Procedure: LAPAROSCOPIC LYSIS ADHESIONS;  Surgeon: Aaron Early MD;  [facility]     LAPAROSCOPIC LYSIS ADHESIONS N/A 2015    Procedure: LAPAROSCOPIC LYSIS ADHESIONS;  Surgeon: Aaron Early MD;  [facility]     PANCREATECTOMY, TRANSPLANT AUTO ISLET CELL, COMBINED  5/10/2013    Procedure: COMBINED PANCREATECTOMY, TRANSPLANT AUTO ISLET CELL;  Pancreatectomy, Auto Islet Cell Transplant   hernia repair, jejunostomy tube and liver biopsies with Anesthesia General with block;  Surgeon: Aaron Early MD;  [facility]: [facility]     REPAIR PTOSIS BROW BILATERAL Bilateral 2020    Procedure: BILATERAL BROW PTOSIS REPAIR;  Surgeon: Denise Alberts MD;  Location: [facility] OR     TRANSPLANT  5/10/13     ZZC NONSPECIFIC PROCEDURE      R bunion     ZC NONSPECIFIC PROCEDURE      T & A     ZZC NONSPECIFIC PROCEDURE      partial ileum resection     ZZ NONSPECIFIC PROCEDURE      R ovarian cyst     ZZC NONSPECIFIC PROCEDURE           ZZC NONSPECIFIC PROCEDURE      GALL BLADDER     ZZC NONSPECIFIC PROCEDURE      CBD stent; Dr. Presley     [facility] [[email or web address] NONSPECIFIC PROCEDURE   &     colonoscopy     ZZC NONSPECIFIC PROCEDURE      Gastric bypass       Prior to Admission Medications   Prior to Admission  Medications   Prescriptions Last Dose Informant Patient Reported? Taking?   Continuous Blood Gluc  (DEXCOM G6 ) MODESTA   No No   Sig: Use as directed to check blood glucose levels   Continuous Blood Gluc Sensor (DEXCOM G6 SENSOR) MISC   No No   Sig: Change every 10 days   Continuous Blood Gluc Transmit (DEXCOM G6 TRANSMITTER) MISC   No No   Sig: Change every 3 months   DULoxetine (CYMBALTA) 30 MG capsule   No No   Sig: Take 1 capsule (30 mg) by mouth 2 times daily Fill at patient request.   Estradiol-Norethindrone Acet 0.5-0.1 MG TABS   No No   Sig: Take 1 tablet by mouth daily   Sharps Container MISC  Self No No   Sig: For insulin needles   UNABLE TO FIND   Yes No   Sig: MEDICATION NAME: Dopatone Active   UNABLE TO FIND   Yes No   Sig: MEDICATION NAME: magnezyme   UNABLE TO FIND   Yes No   Sig: MEDICATION NAME: immunoG PRP   UNABLE TO FIND   Yes No   Sig: MEDICATION NAME: GI-Synergy   acetaminophen (TYLENOL) 325 MG tablet   Yes No   Sig: Take 325 mg by mouth every 6 hours as needed for pain    amoxicillin-clavulanate (AUGMENTIN) 875-125 MG tablet   No No   Sig: Take 1 tablet by mouth 2 times daily   amoxicillin-clavulanate (AUGMENTIN) 875-125 MG tablet   No No   Sig: Take 1 tablet by mouth 2 times daily for 10 days   amylase-lipase-protease (VIOKACE) 95025 units TABS tablet   Yes No   Sig: Take 2 capsules by mouth Take with snacks or supplements   amylase-lipase-protease (VIOKACE) 20416 units TABS tablet   No No   Sig: Take 4-5 with meals and 2 with snacks   blood glucose (BILL MICROLET 2) lancing device   No No   Sig: Use to test blood sugars 6 times daily or as directed.   blood glucose monitoring (BILL CONTOUR NEXT) test strip  Self No No   Sig: Check BS 4-6 times a day   erythromycin (ROMYCIN) 5 MG/GM ophthalmic ointment   No No   Sig: Apply 0.5 inches topically 3 times daily Apply thin ribbon over upper brow incisions 3x per day until follow up/suture removal 6/18/20.   escitalopram (LEXAPRO)  20 MG tablet   Yes No   Sig: Take 20 mg by mouth daily   estradiol (ESTRACE) 0.1 MG/GM vaginal cream   No No   Sig: PLACE 2 GRAMS VAGINALLY TWICE A WEEK   famotidine (PEPCID) 40 MG tablet   No No   Sig: Take 1 tablet (40 mg) by mouth 2 times daily as needed for heartburn   fluconazole (DIFLUCAN) 150 MG tablet   No No   Sig: Take one tablet now, and one tablet in three days   gabapentin (NEURONTIN) 300 MG capsule   Yes No   Sig: Take 1 capsule by mouth At Bedtime    glucose 40 % GEL  Self No No   Sig: Take 15-30 g by mouth every 15 minutes as needed.   hydrOXYzine (ATARAX) 25 MG tablet   Yes No   Sig: TAKE 1-2 TABLETS BY MOUTH 2 TIMES DAILY AS NEEDED FOR ANXIETY   insulin aspart (NOVOLOG FLEXPEN) 100 UNIT/ML pen   Yes No   Sig: Inject Subcutaneous 3 times daily (with meals) 1 units per 15 grams of carbohydrate. Do not give if pre-prandial glucose is less than 60 mg/dL.   insulin aspart (NOVOLOG FLEXPEN) 100 UNIT/ML pen   Yes No   Sig: Inject 1-5 Units Subcutaneous 3 times daily (with meals) For Pre-Meal  -214 give 1 unit.  For Pre-Meal  -289 give 2 unit  For Pre-Meal  -364 give 3 unit.  For Pre-Meal  - 439 give 4 unit.   For Pre-Meal BG greater than or equal to 440 give 5 units.   insulin glargine (LANTUS PEN) 100 UNIT/ML pen   Yes No   Sig: Inject 6 Units Subcutaneous daily (with dinner)   insulin pen needle (BD MAHESH U/F) 32G X 4 MM   No No   Sig: Use up to 3 times daily as needed for insulin dosing   levothyroxine (SYNTHROID/LEVOTHROID) 100 MCG tablet   No No   Sig: Take 1 tablet (100 mcg) by mouth daily   loratadine (CLARITIN) 10 MG tablet  Self Yes No   Sig: Take 10 mg by mouth daily as needed    modafinil (PROVIGIL) 200 MG tablet   Yes No   Sig: Take 400 mg by mouth daily   omeprazole (PRILOSEC) 40 MG DR capsule   No No   Sig: Take 1 capsule (40 mg) by mouth 2 times daily Take 30-60 minutes before a meal.   ondansetron (ZOFRAN-ODT) 4 MG ODT tab   Yes No   Sig: Take 4 mg by mouth every 8  hours as needed for nausea   traZODone (DESYREL) 50 MG tablet   No No   Sig: Take 1 tablet (50 mg) by mouth At Bedtime      Facility-Administered Medications: None     Allergies   Allergies   Allergen Reactions     Corticosteroids Other (See Comments)     All oral, IV and injectable steroids are contraindicated (unless in life threatening situations) in Islet Auto transplant recipients. They can cause irreversible loss of islet cell function. Please contact patient's transplant care coordinator, Erlinda Multani RN BSN at [phone]/pager: [phone] and endocrinologist prior to administration.       Povidone Iodine Hives     Causes skin to blister     Naproxen      Other reaction(s): Abdominal Pain  Pt allergic to Naprosyn     Nsaids      naprosyn = GI upset     Povidone Iodine      blisters     Sulfasalazine Nausea and Nausea and Vomiting       Social History   I have reviewed this patient's social history and updated it with pertinent information if needed.   Social History     Socioeconomic History     Marital status:      Spouse name: Tera     Number of children: 2     Years of education: Not on file     Highest education level: Some college, no degree   Occupational History     Occupation: customer service     Employer: [employer]   Social Needs     Financial resource strain: Hard     Food insecurity     Worry: Sometimes true     Inability: Never true     Transportation needs     Medical: No     Non-medical: No   Tobacco Use     Smoking status: Never Smoker     Smokeless tobacco: Never Used   Substance and Sexual Activity     Alcohol use: No     Alcohol/week: 0.0 standard drinks     Frequency: Never     Drinks per session: Patient refused     Binge frequency: Never     Drug use: No     Sexual activity: Yes     Partners: Male     Birth control/protection: None   Lifestyle     Physical activity     Days per week: 0 days     Minutes per session: 0 min     Stress: Very much   Relationships     Social  connections     Talks on phone: More than three times a week     Gets together: Once a week     Attends Muslim service: More than 4 times per year     Active member of club or organization: Yes     Attends meetings of clubs or organizations: More than 4 times per year     Relationship status:      Intimate partner violence     Fear of current or ex partner: Not on file     Emotionally abused: Not on file     Physically abused: Not on file     Forced sexual activity: Not on file   Other Topics Concern     Parent/sibling w/ CABG, MI or angioplasty before 65F 55M? No   Social History Narrative     Not on file         Family History   I have reviewed this patient's family history and updated it with pertinent information if needed.   Family History   Problem Relation Age of Onset     Family History Negative Mother      Respiratory Father         COPD;  at 69     Genitourinary Problems Father         kidney stones     Substance Abuse Father      Depression Father      Asthma Father      Heart Disease Paternal Grandfather         M.I.     Coronary Artery Disease Paternal Grandfather      Hyperlipidemia Paternal Grandfather      Genitourinary Problems Brother         multiple brothers with kidney stones     Gastrointestinal Disease Maternal Grandmother         undiagnosed 'gut' issues     Coronary Artery Disease Maternal Grandfather      Hyperlipidemia Maternal Grandfather      Cerebrovascular Disease Paternal Grandmother         At the age of 103     Anxiety Disorder Paternal Grandmother      Osteoporosis Paternal Grandmother      Anxiety Disorder Son      Anxiety Disorder Daughter      Asthma Daughter        Review of Systems   The 10 point Review of Systems is negative other than noted in the HPI or here.     Physical Exam   Temp: 98.7  F (37.1  C) Temp src: Temporal BP: (!) 154/84 Pulse: 55     SpO2: 96 %      Vital Signs with Ranges  Temp:  [98.7  F (37.1  C)] 98.7  F (37.1  C)  Pulse:  [55-69] 55  BP:  (154-172)/(84) 154/84  SpO2:  [96 %] 96 %    GENERAL:  Awake and alert, Comfortable appearing, No acute distress.  PSYCH: Pleasant, Cooperative, appropriate affect, no hallucinations   EYES: EOMI, conjunctiva clear  HEENT:  Head is normocephalic, atraumatic, Neck is Supple, trachea is midline   CARDIOVASCULAR: Regular rate and rhythm, Normal S1, S2, no loud murmurs, no rubs or gallops.   PULMONARY:  Clear to auscultation bilaterally with good entry on both sides  CHEST: Good inspiratory effort bilaterally   GI: Abdomen is soft, non tender, non-distended, normal bowel sounds. No rebound or guarding   SKIN:  Dry, warm to touch. Redness on hand as described below   EXTREMITIES:  Good capillary refill with signs of adequate peripheral perfusion. No peripheral edema   Neuro: Awake and oriented x 3. No focal weakness or numbness is noted  MSK: Patient has a wound on the dorsum of left hand, there is surrounding redness, and lymphangitic spread towards forearm. Mild swelling and warmth. No fluctuance or obvious abscess appreciated. Able to flex her fingers    Data   Data reviewed today:  I personally reviewed .    All lab data and imaging results from today have been reviewed.     Recent Labs   Lab 10/28/20  2332   WBC 12.3*   HGB 10.7*   MCV 95         POTASSIUM 3.7   CHLORIDE 109   CO2 27   BUN 28   CR 1.16*   ANIONGAP 7   VALARIE 8.4*   *   ALBUMIN 2.9*   PROTTOTAL 6.5*   BILITOTAL 0.3   ALKPHOS 95   ALT 25   AST 18       No results found for this or any previous visit (from the past 24 hour(s)).

## 2020-10-29 NOTE — PLAN OF CARE
VSS.  Afebrile.  No change to swelling or redness of LUE.  LUE elevated with 3 pillows.  Norco for pain.  ID and ortho consulted.  Xray done.

## 2020-10-29 NOTE — PROGRESS NOTES
Patient seen and examined by me today .Medical records reviewed and plan of care from the previous attending physician  earlier today was discussed with the patient.  Patient  expectantly feeling better.  She is on intravenous Zosyn.  Blood sugar 69 this morning.  Left hand cellulitis due to dog bite which is concerning for tenosynovitis.  Plan is to get recommendation for surgical team, continue biotic, involve infectious disease service as well.  Care discussed with bedside nurse.  Addendum   - spoke with Dr Marrero who recommended abx for now and get orthopedic team to see patient   -ortho physician to see patient for further recommendations regarding concern for tenosynovitis

## 2020-10-29 NOTE — CONSULTS
[facility]    Orthopedic Consultation    Lynn Thompson MRN# [id number]   Age: 55 year old YOB: 1965     Date of Admission:  10/28/2020    Reason for consult: Left hand dog bite        Requesting physician: Dr. Anton       Level of consult: Consult, follow and place orders           Assessment and Plan:   Assessment:   Left hand dog bite with surrounding cellulitis      Plan:   NPO after midnight   Recheck in AM  Likely I&D with Dr. Renteria  Continue to elevate the left UE  Ice  IV abx           Chief Complaint:   Left hand pain and swelling         History of Present Illness:   This patient is a 55 year old female who presents with the following condition requiring a hospital admission:    history of diabetes and s/p auto islet cell transplant in 2013 who presents for an evaluation of a dog bite. The patient reports that she was bitten on her left hand by her vaccinated dog earlier this morning when it was playing was her daughter's dog. She is still able to bend her fingers and hand. She denies being immunocompromised, fever, chills, numbness, or tingling.   She is not chronically anticoagulated.          Past Medical History:     Past Medical History:   Diagnosis Date     Abdominal pain, epigastric 11/05, ongoing; goes to pain clinic    probable recurrent spasm sphincter of Oddi causing biliary colic pains      Benign paroxysmal positional vertigo     occ.      Calculus of kidney 5/05    x1 on L side passed, several stones.  Has been tested for oxalate.     Chronic abdominal pain 7/17/2013     Chronic pancreatitis (H) 7/17/2013     Depressive disorder, not elsewhere classified     also occ panic spells     Diabetes (H)     post pancreatectomy     Dyspepsia and other specified disorders of function of stomach 6/99    H. 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    [place] [facility] [place]     Spasm of sphincter of Oddi     surgical [place] [facility] [place]     Thyroid nodule 11/1/2016     Ureteral calculus 10/2/2012     Vaccination not carried out              Past Surgical History:     Past Surgical History:   Procedure Laterality Date     APPENDECTOMY  1990     CHOLECYSTECTOMY       COLONOSCOPY       COMBINED CYSTOSCOPY, RETROGRADES, URETEROSCOPY, LASER HOLMIUM LITHOTRIPSY URETER(S), INSERT STENT Right 3/23/2015    Procedure: COMBINED CYSTOSCOPY, RETROGRADES, URETEROSCOPY, LASER HOLMIUM LITHOTRIPSY URETER(S), INSERT STENT;  Surgeon: Kennedi Aldana MD;  Location: [facility] OR     COMBINED CYSTOSCOPY, RETROGRADES, URETEROSCOPY, LASER HOLMIUM LITHOTRIPSY URETER(S), INSERT STENT Right 4/20/2015    Procedure: COMBINED CYSTOSCOPY, RETROGRADES, URETEROSCOPY, LASER HOLMIUM LITHOTRIPSY URETER(S), INSERT STENT;  Surgeon: Kennedi Aldana MD;  Location: [facility] OR     COSMETIC SURGERY  6/24/2002    Tummy tu     CYSTOSCOPY, RETROGRADES, INSERT STENT URETER(S), COMBINED  10/2/2012    Procedure: COMBINED CYSTOSCOPY, RETROGRADES, INSERT STENT URETER(S);  COMBINED CYSTOSCOPY,  , INSERT LEFT STENT URETER;  Surgeon: Johny Baez MD;  Location:  OR     ENT SURGERY       ESOPHAGOSCOPY, GASTROSCOPY, DUODENOSCOPY (EGD), COMBINED N/A 1/24/2018    Procedure: COMBINED ESOPHAGOSCOPY, GASTROSCOPY, DUODENOSCOPY (EGD);  ESOPHAGOSCOPY, GASTROSCOPY, DUODENOSCOPY (EGD)    ;  Surgeon: Tamir Rodgers MD;  Location:  GI     EXTRACORPOREAL SHOCK WAVE LITHOTRIPSY (ESWL)  10/16/2012     Procedure: EXTRACORPOREAL SHOCK WAVE LITHOTRIPSY (ESWL);  left EXTRACORPOREAL SHOCK WAVE LITHOTRIPSY (ESWL) ;  Surgeon: Johny Baez MD;  Location: [facility] OR     GI SURGERY  2015    Small bowel obstruction     GI SURGERY      gastric bypass     HC LAP,LYSIS OF ADHESIONS       HERNIA REPAIR  2015     LAPAROSCOPIC LYSIS ADHESIONS N/A 2015    Procedure: LAPAROSCOPIC LYSIS ADHESIONS;  Surgeon: Aaron Early MD;  Location: [facility] OR     LAPAROSCOPIC LYSIS ADHESIONS N/A 2015    Procedure: LAPAROSCOPIC LYSIS ADHESIONS;  Surgeon: Aaron Early MD;  [facility]: [facility] OR     PANCREATECTOMY, TRANSPLANT AUTO ISLET CELL, COMBINED  5/10/2013    Procedure: COMBINED PANCREATECTOMY, TRANSPLANT AUTO ISLET CELL;  Pancreatectomy, Auto Islet Cell Transplant   hernia repair, jejunostomy tube and liver biopsies with Anesthesia General with block;  Surgeon: Aaron Early MD;  Location: [facility] OR     REPAIR PTOSIS BROW BILATERAL Bilateral 2020    Procedure: BILATERAL BROW PTOSIS REPAIR;  Surgeon: Denise Alberts MD;  Location: [facility] OR     TRANSPLANT  5/10/13     ZZC NONSPECIFIC PROCEDURE      R bunion     ZZC NONSPECIFIC PROCEDURE      T & A     ZZC NONSPECIFIC PROCEDURE      partial ileum resection     ZZC NONSPECIFIC PROCEDURE      R ovarian cyst     ZZC NONSPECIFIC PROCEDURE           ZZC NONSPECIFIC PROCEDURE      GALL BLADDER     ZZC NONSPECIFIC PROCEDURE      CBD stent; Dr. Presley     C NONSPECIFIC PROCEDURE   &     colonoscopy     ZZC NONSPECIFIC PROCEDURE      Gastric bypass             Social History:     Social History     Tobacco Use     Smoking status: Never Smoker     Smokeless tobacco: Never Used   Substance Use Topics     Alcohol use: No     Alcohol/week: 0.0 standard drinks     Frequency: Never     Drinks per session: Patient refused     Binge frequency: Never             Family History:     Family History   Problem Relation Age of Onset      Family History Negative Mother      Respiratory Father         COPD;  at 69     Genitourinary Problems Father         kidney stones     Substance Abuse Father      Depression Father      Asthma Father      Heart Disease Paternal Grandfather         M.I.     Coronary Artery Disease Paternal Grandfather      Hyperlipidemia Paternal Grandfather      Genitourinary Problems Brother         multiple brothers with kidney stones     Gastrointestinal Disease Maternal Grandmother         undiagnosed 'gut' issues     Coronary Artery Disease Maternal Grandfather      Hyperlipidemia Maternal Grandfather      Cerebrovascular Disease Paternal Grandmother         At the age of 103     Anxiety Disorder Paternal Grandmother      Osteoporosis Paternal Grandmother      Anxiety Disorder Son      Anxiety Disorder Daughter      Asthma Daughter              Immunizations:     VACCINE/DOSE   Diptheria   DPT   DTAP   HBIG   Hepatitis A   Hepatitis B   HIB   Influenza   Measles   Meningococcal   MMR   Mumps   Pneumococcal   Polio   Rubella   Small Pox   TDAP   Varicella   Zoster             Allergies:     Allergies   Allergen Reactions     Corticosteroids Other (See Comments)     All oral, IV and injectable steroids are contraindicated (unless in life threatening situations) in Islet Auto transplant recipients. They can cause irreversible loss of islet cell function. Please contact patient's transplant care coordinator, Erlinda Multani RN BSN at [phone]/pager: [phone] and endocrinologist prior to administration.       Povidone Iodine Hives     Causes skin to blister     Naproxen      Other reaction(s): Abdominal Pain  Pt allergic to Naprosyn     Nsaids      naprosyn = GI upset     Povidone Iodine      blisters     Sulfasalazine Nausea and Nausea and Vomiting             Medications:     Current Facility-Administered Medications   Medication     acetaminophen (TYLENOL) tablet 650 mg     amylase-lipase-protease (VIOKACE) 54341  units tablet 5 tablet     calcium carbonate-vitamin D (OSCAL w/D) per tablet 1 tablet     glucose gel 15-30 g    Or     dextrose 50 % injection 25-50 mL    Or     glucagon injection 1 mg     glucose gel 15-30 g    Or     dextrose 50 % injection 25-50 mL    Or     glucagon injection 1 mg     DULoxetine (CYMBALTA) DR capsule 20 mg     escitalopram (LEXAPRO) tablet 20 mg     famotidine (PEPCID) tablet 40 mg     gabapentin (NEURONTIN) capsule 300 mg     HYDROcodone-acetaminophen (NORCO) 5-325 MG per tablet 1-2 tablet     insulin aspart (NovoLOG) injection (RAPID ACTING)     insulin glargine (LANTUS) injection 6 Units     lactobacillus rhamnosus (GG) (CULTURELL) capsule 1 capsule     levothyroxine (SYNTHROID/LEVOTHROID) tablet 100 mcg     lidocaine (LMX4) cream     lidocaine 1 % 0.1-1 mL     melatonin tablet 3 mg     naloxone (NARCAN) injection 0.1-0.4 mg     omeprazole (priLOSEC) CR capsule 40 mg     ondansetron (ZOFRAN) injection 4 mg    Or     ondansetron (ZOFRAN-ODT) ODT tab 4 mg     piperacillin-tazobactam (ZOSYN) infusion 3.375 g     polyethylene glycol (MIRALAX) Packet 17 g     sodium chloride (PF) 0.9% PF flush 3 mL     sodium chloride (PF) 0.9% PF flush 3 mL     sodium chloride 0.9% infusion     traZODone (DESYREL) tablet 50 mg     traZODone (DESYREL) tablet 50 mg             Review of Systems:   CV: NEGATIVE for chest pain, palpitations or peripheral edema  C: NEGATIVE for fever, chills, change in weight  E/M: NEGATIVE for ear, mouth and throat problems  R: NEGATIVE for significant cough or SOB          Physical Exam:   All vitals have been reviewed  Patient Vitals for the past 24 hrs:   BP Temp Temp src Pulse Resp SpO2   10/29/20 0902 130/68 99.1  F (37.3  C) Oral 51 16 96 %   10/29/20 0205 (!) 148/87 99.5  F (37.5  C) Oral 61 18 97 %   10/29/20 0130 121/85 -- -- 57 -- 96 %   10/29/20 0100 (!) 143/73 -- -- 55 -- 94 %   10/29/20 0030 (!) 145/72 -- -- 52 -- 96 %   10/29/20 0000 (!) 154/84 -- -- 55 -- 96 %    10/28/20 2029 (!) 172/84 98.7  F (37.1  C) Temporal 69 -- 96 %       Intake/Output Summary (Last 24 hours) at 10/29/2020 1135  Last data filed at 10/29/2020 0251  Gross per 24 hour   Intake 100 ml   Output 250 ml   Net -150 ml     Patient resting comfortably in bed  Left UE elevated on two pillows  Erythema on dorsal hand outlined in marker, within marked boarders  Mild to moderate dorsal hand swelling  Full and active ROM of the wrist and fingers (MCPs and CMC)  Laceration just proximal to 4th finger MCP without drainage  Mild TTP just medial and lateral to laceration  Sensation grossly intact throughout entire hand  + radial pulse  Brisk cap refill          Data:   All laboratory data reviewed  Results for orders placed or performed during the hospital encounter of 10/28/20   CBC with platelets differential     Status: Abnormal   Result Value Ref Range    WBC 12.3 (H) 4.0 - 11.0 10e9/L    RBC Count 3.68 (L) 3.8 - 5.2 10e12/L    Hemoglobin 10.7 (L) 11.7 - 15.7 g/dL    Hematocrit 35.0 35.0 - 47.0 %    MCV 95 78 - 100 fl    MCH 29.1 26.5 - 33.0 pg    MCHC 30.6 (L) 31.5 - 36.5 g/dL    RDW 13.8 10.0 - 15.0 %    Platelet Count 290 150 - 450 10e9/L    Diff Method Automated Method     % Neutrophils 63.1 %    % Lymphocytes 22.4 %    % Monocytes 12.4 %    % Eosinophils 1.3 %    % Basophils 0.6 %    % Immature Granulocytes 0.2 %    Nucleated RBCs 0 0 /100    Absolute Neutrophil 7.8 1.6 - 8.3 10e9/L    Absolute Lymphocytes 2.8 0.8 - 5.3 10e9/L    Absolute Monocytes 1.5 (H) 0.0 - 1.3 10e9/L    Absolute Eosinophils 0.2 0.0 - 0.7 10e9/L    Absolute Basophils 0.1 0.0 - 0.2 10e9/L    Abs Immature Granulocytes 0.0 0 - 0.4 10e9/L    Absolute Nucleated RBC 0.0    Comprehensive metabolic panel     Status: Abnormal   Result Value Ref Range    Sodium 143 133 - 144 mmol/L    Potassium 3.7 3.4 - 5.3 mmol/L    Chloride 109 94 - 109 mmol/L    Carbon Dioxide 27 20 - 32 mmol/L    Anion Gap 7 3 - 14 mmol/L    Glucose 115 (H) 70 - 99 mg/dL     Urea Nitrogen 28 7 - 30 mg/dL    Creatinine 1.16 (H) 0.52 - 1.04 mg/dL    GFR Estimate 52 (L) >60 mL/min/[1.73_m2]    GFR Estimate If Black 60 (L) >60 mL/min/[1.73_m2]    Calcium 8.4 (L) 8.5 - 10.1 mg/dL    Bilirubin Total 0.3 0.2 - 1.3 mg/dL    Albumin 2.9 (L) 3.4 - 5.0 g/dL    Protein Total 6.5 (L) 6.8 - 8.8 g/dL    Alkaline Phosphatase 95 40 - 150 U/L    ALT 25 0 - 50 U/L    AST 18 0 - 45 U/L   Lactic acid whole blood     Status: None   Result Value Ref Range    Lactic Acid 0.7 0.7 - 2.0 mmol/L   CBC with platelets differential     Status: Abnormal   Result Value Ref Range    WBC 8.2 4.0 - 11.0 10e9/L    RBC Count 3.52 (L) 3.8 - 5.2 10e12/L    Hemoglobin 10.3 (L) 11.7 - 15.7 g/dL    Hematocrit 33.4 (L) 35.0 - 47.0 %    MCV 95 78 - 100 fl    MCH 29.3 26.5 - 33.0 pg    MCHC 30.8 (L) 31.5 - 36.5 g/dL    RDW 13.9 10.0 - 15.0 %    Platelet Count 269 150 - 450 10e9/L    Diff Method Automated Method     % Neutrophils 57.5 %    % Lymphocytes 25.6 %    % Monocytes 13.7 %    % Eosinophils 2.3 %    % Basophils 0.7 %    % Immature Granulocytes 0.2 %    Nucleated RBCs 0 0 /100    Absolute Neutrophil 4.7 1.6 - 8.3 10e9/L    Absolute Lymphocytes 2.1 0.8 - 5.3 10e9/L    Absolute Monocytes 1.1 0.0 - 1.3 10e9/L    Absolute Eosinophils 0.2 0.0 - 0.7 10e9/L    Absolute Basophils 0.1 0.0 - 0.2 10e9/L    Abs Immature Granulocytes 0.0 0 - 0.4 10e9/L    Absolute Nucleated RBC 0.0    Basic metabolic panel     Status: Abnormal   Result Value Ref Range    Sodium 142 133 - 144 mmol/L    Potassium 3.5 3.4 - 5.3 mmol/L    Chloride 109 94 - 109 mmol/L    Carbon Dioxide 27 20 - 32 mmol/L    Anion Gap 6 3 - 14 mmol/L    Glucose 84 70 - 99 mg/dL    Urea Nitrogen 23 7 - 30 mg/dL    Creatinine 0.86 0.52 - 1.04 mg/dL    GFR Estimate 75 >60 mL/min/[1.73_m2]    GFR Estimate If Black 86 >60 mL/min/[1.73_m2]    Calcium 8.0 (L) 8.5 - 10.1 mg/dL   Glucose by meter     Status: None   Result Value Ref Range    Glucose 76 70 - 99 mg/dL   Glucose by meter      Status: Abnormal   Result Value Ref Range    Glucose 69 (L) 70 - 99 mg/dL   Blood culture     Status: None (Preliminary result)    Specimen: Blood    Right Hand   Result Value Ref Range    Specimen Description Blood Right Hand     Culture Micro No growth after 4 hours    Blood culture     Status: None (Preliminary result)    Specimen: Blood    Left Arm   Result Value Ref Range    Specimen Description Blood Left Arm     Culture Micro No growth after 4 hours           Attestation:  I have reviewed today's vital signs, notes, medications, labs and imaging with Dr. Reza/Myra.  Amount of time performed on this consult: 30 minutes.    Erlinda Marvin PA-C

## 2020-10-29 NOTE — PROGRESS NOTES
"[facility] Progress Note  [facility]  Peds Unit (overflow)    Saw pt Lynn per her request for  support.  Offered reflective conversation to facilitate the processing of thoughts and feelings.      Lynn engaged in reflective conversation and shared the following.  She reported that she is being treated for a dog bite.  Lynn expressed her \"disappointment\" because of the \"bad timing\" of her injury as she was hoping to help an 18-year old volunteer to care for a dog, who is having puppies, in an animal rescue program.      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  She described herself as being spiritual and reflected on how her spirituality gives her \"strength\" and \"comfort.\"  Lynn reflected that her grandmother is a model of strong rhonda and shared how at the age of 58 her grandmother started taking care of her and her other six siblings.      Lynn's  Tera arrived near the end of our conversation.  He reported that he is coping with Lynn's hospitalization.      Offered emotional support through reflective listening, validation of feelings, and affirmation of her calling.  Lynn welcomed prayer.    Informed pt and her spouse how they can request further  support.  This author and other chaplains remain available per pt/family request.    Luis Valdovinos M.Div., [facility]  Staff   Phone [phone]    "

## 2020-10-29 NOTE — ED TRIAGE NOTES
Pt in with C/O dog bite to the L hand and worsening redness and swelling. Pt seen in ER yesterday and placed on Augmentin. Redness and swelling present on exam.

## 2020-10-30 ENCOUNTER — ANESTHESIA EVENT (OUTPATIENT)
Dept: SURGERY | Facility: CLINIC | Age: 55
DRG: 579 | End: 2020-10-30
Payer: MEDICARE

## 2020-10-30 ENCOUNTER — ANESTHESIA (OUTPATIENT)
Dept: SURGERY | Facility: CLINIC | Age: 55
DRG: 579 | End: 2020-10-30
Payer: MEDICARE

## 2020-10-30 ENCOUNTER — APPOINTMENT (OUTPATIENT)
Dept: OCCUPATIONAL THERAPY | Facility: CLINIC | Age: 55
DRG: 579 | End: 2020-10-30
Attending: ORTHOPAEDIC SURGERY
Payer: MEDICARE

## 2020-10-30 LAB
ANION GAP SERPL CALCULATED.3IONS-SCNC: 4 MMOL/L (ref 3–14)
BUN SERPL-MCNC: 12 MG/DL (ref 7–30)
CALCIUM SERPL-MCNC: 8.1 MG/DL (ref 8.5–10.1)
CHLORIDE SERPL-SCNC: 109 MMOL/L (ref 94–109)
CO2 SERPL-SCNC: 29 MMOL/L (ref 20–32)
CREAT SERPL-MCNC: 0.86 MG/DL (ref 0.52–1.04)
CRP SERPL-MCNC: 21.8 MG/L (ref 0–8)
ERYTHROCYTE [DISTWIDTH] IN BLOOD BY AUTOMATED COUNT: 14 % (ref 10–15)
ERYTHROCYTE [SEDIMENTATION RATE] IN BLOOD BY WESTERGREN METHOD: 13 MM/H (ref 0–30)
GFR SERPL CREATININE-BSD FRML MDRD: 75 ML/MIN/{1.73_M2}
GLUCOSE BLDC GLUCOMTR-MCNC: 125 MG/DL (ref 70–99)
GLUCOSE BLDC GLUCOMTR-MCNC: 129 MG/DL (ref 70–99)
GLUCOSE BLDC GLUCOMTR-MCNC: 179 MG/DL (ref 70–99)
GLUCOSE BLDC GLUCOMTR-MCNC: 95 MG/DL (ref 70–99)
GLUCOSE SERPL-MCNC: 126 MG/DL (ref 70–99)
HCT VFR BLD AUTO: 34.5 % (ref 35–47)
HGB BLD-MCNC: 10.5 G/DL (ref 11.7–15.7)
MCH RBC QN AUTO: 29.1 PG (ref 26.5–33)
MCHC RBC AUTO-ENTMCNC: 30.4 G/DL (ref 31.5–36.5)
MCV RBC AUTO: 96 FL (ref 78–100)
PLATELET # BLD AUTO: 275 10E9/L (ref 150–450)
POTASSIUM SERPL-SCNC: 4.1 MMOL/L (ref 3.4–5.3)
RBC # BLD AUTO: 3.61 10E12/L (ref 3.8–5.2)
SODIUM SERPL-SCNC: 142 MMOL/L (ref 133–144)
WBC # BLD AUTO: 7.4 10E9/L (ref 4–11)

## 2020-10-30 PROCEDURE — 360N000024 HC SURGERY LEVEL 3 W FLUORO 1ST 30 MIN: Performed by: ORTHOPAEDIC SURGERY

## 2020-10-30 PROCEDURE — 97165 OT EVAL LOW COMPLEX 30 MIN: CPT | Mod: GO

## 2020-10-30 PROCEDURE — 120N000006 HC R&B PEDS

## 2020-10-30 PROCEDURE — 258N000003 HC RX IP 258 OP 636: Performed by: NURSE ANESTHETIST, CERTIFIED REGISTERED

## 2020-10-30 PROCEDURE — 87070 CULTURE OTHR SPECIMN AEROBIC: CPT | Performed by: ORTHOPAEDIC SURGERY

## 2020-10-30 PROCEDURE — 360N000021 HC SURGERY LEVEL 3 EA 15 ADDTL MIN: Performed by: ORTHOPAEDIC SURGERY

## 2020-10-30 PROCEDURE — 272N000001 HC OR GENERAL SUPPLY STERILE: Performed by: ORTHOPAEDIC SURGERY

## 2020-10-30 PROCEDURE — 250N000013 HC RX MED GY IP 250 OP 250 PS 637: Performed by: ORTHOPAEDIC SURGERY

## 2020-10-30 PROCEDURE — 761N000001 HC RECOVERY PHASE 1 LEVEL 1 FIRST HR: Performed by: ORTHOPAEDIC SURGERY

## 2020-10-30 PROCEDURE — 36415 COLL VENOUS BLD VENIPUNCTURE: CPT | Performed by: INTERNAL MEDICINE

## 2020-10-30 PROCEDURE — 258N000003 HC RX IP 258 OP 636: Performed by: ORTHOPAEDIC SURGERY

## 2020-10-30 PROCEDURE — 250N000011 HC RX IP 250 OP 636: Performed by: ORTHOPAEDIC SURGERY

## 2020-10-30 PROCEDURE — 999N001017 HC STATISTIC GLUCOSE BY METER IP

## 2020-10-30 PROCEDURE — 250N000009 HC RX 250: Performed by: ANESTHESIOLOGY

## 2020-10-30 PROCEDURE — 99232 SBSQ HOSP IP/OBS MODERATE 35: CPT | Performed by: INTERNAL MEDICINE

## 2020-10-30 PROCEDURE — 87075 CULTR BACTERIA EXCEPT BLOOD: CPT | Performed by: ORTHOPAEDIC SURGERY

## 2020-10-30 PROCEDURE — 250N000012 HC RX MED GY IP 250 OP 636 PS 637: Performed by: INTERNAL MEDICINE

## 2020-10-30 PROCEDURE — 250N000011 HC RX IP 250 OP 636: Performed by: PHYSICIAN ASSISTANT

## 2020-10-30 PROCEDURE — 250N000011 HC RX IP 250 OP 636: Performed by: INTERNAL MEDICINE

## 2020-10-30 PROCEDURE — 370N000001 HC ANESTHESIA TECHNICAL FEE, 1ST 30 MIN: Performed by: ORTHOPAEDIC SURGERY

## 2020-10-30 PROCEDURE — 370N000002 HC ANESTHESIA TECHNICAL FEE, EACH ADDTL 15 MIN: Performed by: ORTHOPAEDIC SURGERY

## 2020-10-30 PROCEDURE — 250N000013 HC RX MED GY IP 250 OP 250 PS 637: Performed by: INTERNAL MEDICINE

## 2020-10-30 PROCEDURE — 0JBK0ZZ EXCISION OF LEFT HAND SUBCUTANEOUS TISSUE AND FASCIA, OPEN APPROACH: ICD-10-PCS | Performed by: ORTHOPAEDIC SURGERY

## 2020-10-30 PROCEDURE — 85027 COMPLETE CBC AUTOMATED: CPT | Performed by: INTERNAL MEDICINE

## 2020-10-30 PROCEDURE — 250N000009 HC RX 250: Performed by: ORTHOPAEDIC SURGERY

## 2020-10-30 PROCEDURE — 250N000012 HC RX MED GY IP 250 OP 636 PS 637: Performed by: ORTHOPAEDIC SURGERY

## 2020-10-30 PROCEDURE — 250N000011 HC RX IP 250 OP 636: Performed by: NURSE ANESTHETIST, CERTIFIED REGISTERED

## 2020-10-30 PROCEDURE — 999N000136 HC STATISTIC PRE PROC ASSESS II: Performed by: ORTHOPAEDIC SURGERY

## 2020-10-30 PROCEDURE — 97535 SELF CARE MNGMENT TRAINING: CPT | Mod: GO

## 2020-10-30 PROCEDURE — 85652 RBC SED RATE AUTOMATED: CPT | Performed by: INTERNAL MEDICINE

## 2020-10-30 PROCEDURE — 86140 C-REACTIVE PROTEIN: CPT | Performed by: INTERNAL MEDICINE

## 2020-10-30 PROCEDURE — 250N000009 HC RX 250: Performed by: NURSE ANESTHETIST, CERTIFIED REGISTERED

## 2020-10-30 PROCEDURE — 80048 BASIC METABOLIC PNL TOTAL CA: CPT | Performed by: INTERNAL MEDICINE

## 2020-10-30 RX ORDER — CEFAZOLIN SODIUM 1 G/50ML
1 INJECTION, SOLUTION INTRAVENOUS EVERY 8 HOURS
Status: COMPLETED | OUTPATIENT
Start: 2020-10-30 | End: 2020-10-31

## 2020-10-30 RX ORDER — PROPOFOL 10 MG/ML
INJECTION, EMULSION INTRAVENOUS PRN
Status: DISCONTINUED | OUTPATIENT
Start: 2020-10-30 | End: 2020-10-30

## 2020-10-30 RX ORDER — DOCUSATE SODIUM 100 MG/1
100 CAPSULE, LIQUID FILLED ORAL 2 TIMES DAILY
Status: DISCONTINUED | OUTPATIENT
Start: 2020-10-30 | End: 2020-10-31 | Stop reason: HOSPADM

## 2020-10-30 RX ORDER — DIMENHYDRINATE 50 MG/ML
25 INJECTION, SOLUTION INTRAMUSCULAR; INTRAVENOUS
Status: DISCONTINUED | OUTPATIENT
Start: 2020-10-30 | End: 2020-10-31 | Stop reason: HOSPADM

## 2020-10-30 RX ORDER — AMOXICILLIN 250 MG
1 CAPSULE ORAL 2 TIMES DAILY
Status: DISCONTINUED | OUTPATIENT
Start: 2020-10-30 | End: 2020-10-31 | Stop reason: HOSPADM

## 2020-10-30 RX ORDER — ONDANSETRON 4 MG/1
4 TABLET, ORALLY DISINTEGRATING ORAL EVERY 30 MIN PRN
Status: DISCONTINUED | OUTPATIENT
Start: 2020-10-30 | End: 2020-10-30

## 2020-10-30 RX ORDER — NALOXONE HYDROCHLORIDE 0.4 MG/ML
.1-.4 INJECTION, SOLUTION INTRAMUSCULAR; INTRAVENOUS; SUBCUTANEOUS
Status: DISCONTINUED | OUTPATIENT
Start: 2020-10-30 | End: 2020-10-30

## 2020-10-30 RX ORDER — CEFAZOLIN SODIUM 1 G/3ML
1 INJECTION, POWDER, FOR SOLUTION INTRAMUSCULAR; INTRAVENOUS SEE ADMIN INSTRUCTIONS
Status: DISCONTINUED | OUTPATIENT
Start: 2020-10-30 | End: 2020-10-30

## 2020-10-30 RX ORDER — SODIUM CHLORIDE, SODIUM LACTATE, POTASSIUM CHLORIDE, CALCIUM CHLORIDE 600; 310; 30; 20 MG/100ML; MG/100ML; MG/100ML; MG/100ML
INJECTION, SOLUTION INTRAVENOUS CONTINUOUS
Status: DISCONTINUED | OUTPATIENT
Start: 2020-10-30 | End: 2020-10-30

## 2020-10-30 RX ORDER — FENTANYL CITRATE 50 UG/ML
INJECTION, SOLUTION INTRAMUSCULAR; INTRAVENOUS PRN
Status: DISCONTINUED | OUTPATIENT
Start: 2020-10-30 | End: 2020-10-30

## 2020-10-30 RX ORDER — POLYETHYLENE GLYCOL 3350 17 G/17G
17 POWDER, FOR SOLUTION ORAL DAILY
Status: DISCONTINUED | OUTPATIENT
Start: 2020-10-31 | End: 2020-10-31 | Stop reason: HOSPADM

## 2020-10-30 RX ORDER — ACETAMINOPHEN 325 MG/1
975 TABLET ORAL EVERY 8 HOURS
Status: DISCONTINUED | OUTPATIENT
Start: 2020-10-30 | End: 2020-10-31 | Stop reason: HOSPADM

## 2020-10-30 RX ORDER — HYDROXYZINE HYDROCHLORIDE 25 MG/1
25 TABLET, FILM COATED ORAL EVERY 6 HOURS PRN
Status: DISCONTINUED | OUTPATIENT
Start: 2020-10-30 | End: 2020-10-31 | Stop reason: HOSPADM

## 2020-10-30 RX ORDER — ONDANSETRON 2 MG/ML
4 INJECTION INTRAMUSCULAR; INTRAVENOUS EVERY 6 HOURS PRN
Status: DISCONTINUED | OUTPATIENT
Start: 2020-10-30 | End: 2020-10-30

## 2020-10-30 RX ORDER — ACETAMINOPHEN 325 MG/1
650 TABLET ORAL EVERY 4 HOURS PRN
Status: DISCONTINUED | OUTPATIENT
Start: 2020-11-02 | End: 2020-10-31 | Stop reason: HOSPADM

## 2020-10-30 RX ORDER — ONDANSETRON 4 MG/1
4 TABLET, ORALLY DISINTEGRATING ORAL EVERY 6 HOURS PRN
Status: DISCONTINUED | OUTPATIENT
Start: 2020-10-30 | End: 2020-10-30

## 2020-10-30 RX ORDER — FENTANYL CITRATE 50 UG/ML
25-50 INJECTION, SOLUTION INTRAMUSCULAR; INTRAVENOUS
Status: DISCONTINUED | OUTPATIENT
Start: 2020-10-30 | End: 2020-10-30

## 2020-10-30 RX ORDER — BUPIVACAINE HYDROCHLORIDE AND EPINEPHRINE 2.5; 5 MG/ML; UG/ML
INJECTION, SOLUTION INFILTRATION; PERINEURAL PRN
Status: DISCONTINUED | OUTPATIENT
Start: 2020-10-30 | End: 2020-10-30 | Stop reason: HOSPADM

## 2020-10-30 RX ORDER — LIDOCAINE 40 MG/G
CREAM TOPICAL
Status: DISCONTINUED | OUTPATIENT
Start: 2020-10-30 | End: 2020-10-31 | Stop reason: HOSPADM

## 2020-10-30 RX ORDER — HYDROMORPHONE HYDROCHLORIDE 1 MG/ML
0.2 INJECTION, SOLUTION INTRAMUSCULAR; INTRAVENOUS; SUBCUTANEOUS
Status: DISCONTINUED | OUTPATIENT
Start: 2020-10-30 | End: 2020-10-31 | Stop reason: HOSPADM

## 2020-10-30 RX ORDER — HYDROMORPHONE HYDROCHLORIDE 1 MG/ML
0.4 INJECTION, SOLUTION INTRAMUSCULAR; INTRAVENOUS; SUBCUTANEOUS
Status: DISCONTINUED | OUTPATIENT
Start: 2020-10-30 | End: 2020-10-31 | Stop reason: HOSPADM

## 2020-10-30 RX ORDER — OXYCODONE HYDROCHLORIDE 5 MG/1
5-10 TABLET ORAL EVERY 4 HOURS PRN
Status: DISCONTINUED | OUTPATIENT
Start: 2020-10-30 | End: 2020-10-31 | Stop reason: HOSPADM

## 2020-10-30 RX ORDER — LABETALOL 20 MG/4 ML (5 MG/ML) INTRAVENOUS SYRINGE
10
Status: DISCONTINUED | OUTPATIENT
Start: 2020-10-30 | End: 2020-10-31 | Stop reason: HOSPADM

## 2020-10-30 RX ORDER — HYDRALAZINE HYDROCHLORIDE 20 MG/ML
2.5-5 INJECTION INTRAMUSCULAR; INTRAVENOUS EVERY 10 MIN PRN
Status: DISCONTINUED | OUTPATIENT
Start: 2020-10-30 | End: 2020-10-31 | Stop reason: HOSPADM

## 2020-10-30 RX ORDER — NALOXONE HYDROCHLORIDE 0.4 MG/ML
.1-.4 INJECTION, SOLUTION INTRAMUSCULAR; INTRAVENOUS; SUBCUTANEOUS
Status: DISCONTINUED | OUTPATIENT
Start: 2020-10-30 | End: 2020-10-31 | Stop reason: HOSPADM

## 2020-10-30 RX ORDER — SODIUM CHLORIDE, SODIUM LACTATE, POTASSIUM CHLORIDE, CALCIUM CHLORIDE 600; 310; 30; 20 MG/100ML; MG/100ML; MG/100ML; MG/100ML
INJECTION, SOLUTION INTRAVENOUS CONTINUOUS PRN
Status: DISCONTINUED | OUTPATIENT
Start: 2020-10-30 | End: 2020-10-30

## 2020-10-30 RX ORDER — BISACODYL 10 MG
10 SUPPOSITORY, RECTAL RECTAL DAILY PRN
Status: DISCONTINUED | OUTPATIENT
Start: 2020-10-30 | End: 2020-10-31 | Stop reason: HOSPADM

## 2020-10-30 RX ORDER — TRAMADOL HYDROCHLORIDE 50 MG/1
50 TABLET ORAL EVERY 6 HOURS PRN
Status: DISCONTINUED | OUTPATIENT
Start: 2020-10-30 | End: 2020-10-31

## 2020-10-30 RX ORDER — CEFAZOLIN SODIUM 2 G/100ML
2 INJECTION, SOLUTION INTRAVENOUS
Status: COMPLETED | OUTPATIENT
Start: 2020-10-30 | End: 2020-10-30

## 2020-10-30 RX ORDER — ONDANSETRON 2 MG/ML
4 INJECTION INTRAMUSCULAR; INTRAVENOUS EVERY 30 MIN PRN
Status: DISCONTINUED | OUTPATIENT
Start: 2020-10-30 | End: 2020-10-30

## 2020-10-30 RX ORDER — PROCHLORPERAZINE MALEATE 10 MG
10 TABLET ORAL EVERY 6 HOURS PRN
Status: DISCONTINUED | OUTPATIENT
Start: 2020-10-30 | End: 2020-10-31 | Stop reason: HOSPADM

## 2020-10-30 RX ORDER — GLYCOPYRROLATE 0.2 MG/ML
INJECTION, SOLUTION INTRAMUSCULAR; INTRAVENOUS PRN
Status: DISCONTINUED | OUTPATIENT
Start: 2020-10-30 | End: 2020-10-30

## 2020-10-30 RX ADMIN — CEFAZOLIN SODIUM 1 G: 1 INJECTION, SOLUTION INTRAVENOUS at 17:59

## 2020-10-30 RX ADMIN — ONDANSETRON 4 MG: 2 INJECTION INTRAMUSCULAR; INTRAVENOUS at 09:26

## 2020-10-30 RX ADMIN — GABAPENTIN 300 MG: 300 CAPSULE ORAL at 20:06

## 2020-10-30 RX ADMIN — DULOXETINE HYDROCHLORIDE 20 MG: 20 CAPSULE, DELAYED RELEASE ORAL at 10:44

## 2020-10-30 RX ADMIN — HYDROCODONE BITARTRATE AND ACETAMINOPHEN 1 TABLET: 5; 325 TABLET ORAL at 06:43

## 2020-10-30 RX ADMIN — SODIUM CHLORIDE, POTASSIUM CHLORIDE, SODIUM LACTATE AND CALCIUM CHLORIDE: 600; 310; 30; 20 INJECTION, SOLUTION INTRAVENOUS at 09:17

## 2020-10-30 RX ADMIN — CEFAZOLIN SODIUM 2 G: 2 INJECTION, SOLUTION INTRAVENOUS at 09:32

## 2020-10-30 RX ADMIN — OXYCODONE HYDROCHLORIDE 5 MG: 5 TABLET ORAL at 16:26

## 2020-10-30 RX ADMIN — INSULIN GLARGINE 6 UNITS: 100 INJECTION, SOLUTION SUBCUTANEOUS at 17:59

## 2020-10-30 RX ADMIN — DOCUSATE SODIUM 50 MG AND SENNOSIDES 8.6 MG 1 TABLET: 8.6; 5 TABLET, FILM COATED ORAL at 20:06

## 2020-10-30 RX ADMIN — OMEPRAZOLE 40 MG: 20 CAPSULE, DELAYED RELEASE ORAL at 20:06

## 2020-10-30 RX ADMIN — OMEPRAZOLE 40 MG: 20 CAPSULE, DELAYED RELEASE ORAL at 10:45

## 2020-10-30 RX ADMIN — Medication 1 CAPSULE: at 10:43

## 2020-10-30 RX ADMIN — FENTANYL CITRATE 100 MCG: 50 INJECTION, SOLUTION INTRAMUSCULAR; INTRAVENOUS at 09:24

## 2020-10-30 RX ADMIN — ACETAMINOPHEN 975 MG: 325 TABLET, FILM COATED ORAL at 20:05

## 2020-10-30 RX ADMIN — LEVOTHYROXINE SODIUM 100 MCG: 0.1 TABLET ORAL at 10:45

## 2020-10-30 RX ADMIN — INSULIN ASPART 4 UNITS: 100 INJECTION, SOLUTION INTRAVENOUS; SUBCUTANEOUS at 11:57

## 2020-10-30 RX ADMIN — PANCRELIPASE 5 TABLET: 20880; 78300; 78300 TABLET ORAL at 11:50

## 2020-10-30 RX ADMIN — TAZOBACTAM SODIUM AND PIPERACILLIN SODIUM 3.38 G: 375; 3 INJECTION, SOLUTION INTRAVENOUS at 07:42

## 2020-10-30 RX ADMIN — TAZOBACTAM SODIUM AND PIPERACILLIN SODIUM 3.38 G: 375; 3 INJECTION, SOLUTION INTRAVENOUS at 23:48

## 2020-10-30 RX ADMIN — LIDOCAINE HYDROCHLORIDE 50 MG: 10 INJECTION, SOLUTION EPIDURAL; INFILTRATION; INTRACAUDAL; PERINEURAL at 09:24

## 2020-10-30 RX ADMIN — PROPOFOL 150 MG: 10 INJECTION, EMULSION INTRAVENOUS at 09:24

## 2020-10-30 RX ADMIN — ROCURONIUM BROMIDE 25 MG: 10 INJECTION INTRAVENOUS at 09:24

## 2020-10-30 RX ADMIN — TRAZODONE HYDROCHLORIDE 50 MG: 50 TABLET ORAL at 22:15

## 2020-10-30 RX ADMIN — DOCUSATE SODIUM 100 MG: 100 CAPSULE, LIQUID FILLED ORAL at 20:06

## 2020-10-30 RX ADMIN — TAZOBACTAM SODIUM AND PIPERACILLIN SODIUM 3.38 G: 375; 3 INJECTION, SOLUTION INTRAVENOUS at 16:26

## 2020-10-30 RX ADMIN — SODIUM CHLORIDE, POTASSIUM CHLORIDE, SODIUM LACTATE AND CALCIUM CHLORIDE: 600; 310; 30; 20 INJECTION, SOLUTION INTRAVENOUS at 10:54

## 2020-10-30 RX ADMIN — OXYCODONE HYDROCHLORIDE 5 MG: 5 TABLET ORAL at 20:05

## 2020-10-30 RX ADMIN — GLYCOPYRROLATE 0.2 MG: 0.2 INJECTION, SOLUTION INTRAMUSCULAR; INTRAVENOUS at 09:24

## 2020-10-30 RX ADMIN — PANCRELIPASE 5 TABLET: 20880; 78300; 78300 TABLET ORAL at 17:57

## 2020-10-30 RX ADMIN — Medication 1 TABLET: at 10:43

## 2020-10-30 RX ADMIN — INSULIN ASPART 3 UNITS: 100 INJECTION, SOLUTION INTRAVENOUS; SUBCUTANEOUS at 17:59

## 2020-10-30 RX ADMIN — MIDAZOLAM 2 MG: 1 INJECTION INTRAMUSCULAR; INTRAVENOUS at 09:17

## 2020-10-30 RX ADMIN — ESCITALOPRAM OXALATE 20 MG: 20 TABLET ORAL at 10:45

## 2020-10-30 RX ADMIN — HYDROCODONE BITARTRATE AND ACETAMINOPHEN 2 TABLET: 5; 325 TABLET ORAL at 10:44

## 2020-10-30 RX ADMIN — TAZOBACTAM SODIUM AND PIPERACILLIN SODIUM 3.38 G: 375; 3 INJECTION, SOLUTION INTRAVENOUS at 01:02

## 2020-10-30 ASSESSMENT — ACTIVITIES OF DAILY LIVING (ADL): PREVIOUS_RESPONSIBILITIES: MEAL PREP;HOUSEKEEPING;LAUNDRY;SHOPPING;DRIVING

## 2020-10-30 ASSESSMENT — LIFESTYLE VARIABLES: TOBACCO_USE: 0

## 2020-10-30 ASSESSMENT — COPD QUESTIONNAIRES: COPD: 0

## 2020-10-30 ASSESSMENT — ENCOUNTER SYMPTOMS
STRIDOR: 0
DYSRHYTHMIAS: 0
SEIZURES: 0

## 2020-10-30 NOTE — PROVIDER NOTIFICATION
BS of 275 at 2200 called to admitting Lukas Bhandari held earlier in shift when pt NPO.  at 1800. Pt then able to eat ( surg in am) and BS at hs was then 275. IV fluids changed to NS at 50/hr and blood sugar to be rechecked at 0100.

## 2020-10-30 NOTE — PROGRESS NOTES
10/30/20 1400   Quick Adds   Type of Visit Initial Occupational Therapy Evaluation   Living Environment   People in home spouse   Current Living Arrangements house   Transportation Anticipated family or friend will provide;car, drives self   Living Environment Comments Pt lives in split level home with spouse, all needs are on main level, tub/shower.    Self-Care   Current Activity Tolerance good   Equipment Currently Used at Home none   Disability/Function   Hearing Difficulty or Deaf no   Wear Glasses or Blind yes   Vision Management wears glasses   Concentrating, Remembering or Making Decisions Difficulty no   Difficulty Communicating no   Difficulty Eating/Swallowing no   Walking or Climbing Stairs Difficulty no   Dressing/Bathing Difficulty no   Toileting no   Doing Errands Independently Difficulty (such as shopping) no   Fall history within last six months no   General Information   Onset of Illness/Injury or Date of Surgery 10/28/20   Referring Physician Demian Renteria MD   Patient/Family Therapy Goal Statement (OT) return home   Additional Occupational Profile Info/Pertinent History of Current Problem Pt is a 55 year old female who presented to ED due to L hand infection from dog bite, PMH significant for pancreatic auto islet cell transplant c/b IDDM, Crohn's disease s/p ileal resection, BLU, chronic pain, iron def anemia, Hypothyroidism, MDD. 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   Performance Patterns (Routines, Roles, Habits) Pt reports indep in ADLs, IADLs, and functional mobility at baseline   General Observations and Info Pt walking to BR upon arrival, agreeable to OT   Cognitive Status Examination   Orientation Status orientation to person, place and time   Affect/Mental Status (Cognitive) WNL   Follows Commands WNL   Sensory   Sensory Comments intact sensation of LUE post surgery   Pain Assessment   Patient Currently in Pain No   Range of Motion  Comprehensive   Comment, General Range of Motion RUE WNL, LUE decreased ROM at wrist and digits as expected post surgery   Strength Comprehensive (MMT)   Comment, General Manual Muscle Testing (MMT) Assessment RUE WNL, LUE decreased strength as expected post surgery   Transfers   Transfers sit-stand transfer;toilet transfer   Transfer Comments refer to OT daily note   Sit-Stand Transfer   Sit-Stand Taylor (Transfers) supervision   Sit/Stand Transfer Comments refer to OT daily note   Toilet Transfer   Type (Toilet Transfer) sit-stand;stand-sit   Taylor Level (Toilet Transfer) modified independence   Toilet Transfer Comments refer to OT daily note   Balance   Balance Comments no LOB noted   Instrumental Activities of Daily Living (IADL)   Previous Responsibilities meal prep;housekeeping;laundry;shopping;driving   IADL Comments Pt shares responsibilities with spouse,  will A with IADLs upon return home   Clinical Impression   Criteria for Skilled Therapeutic Interventions Met (OT) yes   OT Diagnosis decreased indep in ADLs, IADLs   OT Problem List-Impairments impacting ADL range of motion (ROM);strength;pain   Assessment of Occupational Performance 5 or more Performance Deficits   Identified Performance Deficits bathing;dressing;toileting;housekeeping;driving;work;errands   Planned Therapy Interventions (OT) ADL retraining;IADL retraining;progressive activity/exercise   Clinical Decision Making Complexity (OT) low complexity   Therapy Frequency (OT) 1x eval and treat   Risks and Benefits of Treatment have been explained. Yes   Patient, Family & other staff in agreement with plan of care Yes   OT Discharge Planning    OT Discharge Recommendation (DC Rec) Home with assist   OT Rationale for DC Rec Pt met goals and is safe for return home, pt would benefit from A as needed from  for completion of ADLs (dressing, bathing) and IADLs (errands, driving, etc) at discharge home.   Total Evaluation Time  (Minutes)   Total Evaluation Time (Minutes) 10

## 2020-10-30 NOTE — PLAN OF CARE
Shift Summary [id number]-  Patient vital sign stable and afebrile. Patient denied pain/discomfort at assessment and has slept well throughout shift. Left hand remains reddened, swollen, and warm to touch. Left hand elevated on 3 pillows. Patient has been NPO since midnight. 0100 blood glucose 179. PIV to left AC infusing without signs of infiltration. No calls/visits this shift, but patient has smartphone and ipad at bedside. . Please refer to flowsheet for further assessments/data. Plan to continue to monitor patients status, monitor patients comfort level, administer medications per orders, and anticipate scheduled I&D this morning.

## 2020-10-30 NOTE — PLAN OF CARE
Vital signs: Bradycardic present, but improving; B/P: 117/53, Temp: 96.8, HR: 48-57, RR: 14  Pain Control: Norco PRN  Diet: NPO  Output: Voiding adequately.  Activity: Up ambulating in room independently.   Updates: Left hand red and swollen; elevated on pillows. Wound present on top of left hand and one small wound on bottom of index finger. No drainage present. Swelling and redness. I&D completed this morning and hand now ace wrapped and elevated. PT here at 1335 (see note).  Plan: Monitor and assess VS. Continue IV antibiotics as ordered.

## 2020-10-30 NOTE — ANESTHESIA CARE TRANSFER NOTE
Patient: Lynn Thompson    Procedure(s):  Incison and drainage left hand    Diagnosis: Dog bite [W54.0XXA]  Diagnosis Additional Information: No value filed.    Anesthesia Type:   General     Note:    Patient transferred to:PACU  Handoff Report: Identifed the Patient, Identified the Reponsible Provider, Reviewed the pertinent medical history, Discussed the surgical course, Reviewed Intra-OP anesthesia mangement and issues during anesthesia, Set expectations for post-procedure period and Allowed opportunity for questions and acknowledgement of understanding      Vitals: (Last set prior to Anesthesia Care Transfer)    CRNA VITALS  10/30/2020 0921 - 10/30/2020 0955      10/30/2020             Pulse:  103    SpO2:  99 %                Electronically Signed By: MITZY Reece CRNA  October 30, 2020  9:55 AM

## 2020-10-30 NOTE — ANESTHESIA PREPROCEDURE EVALUATION
Anesthesia Pre-Procedure Evaluation    Patient: Lynn Thompson   MRN: [id number] : 1965          Preoperative Diagnosis: Dog bite [W54.0XXA]    Procedure(s):  Incison and drainage left hand    Past Medical History:   Diagnosis Date     Abdominal pain, epigastric , ongoing; goes to pain clinic    probable recurrent spasm sphincter of Oddi causing biliary colic pains      Benign paroxysmal positional vertigo     occ.      Calculus of kidney 5/05    x1 on L side passed, several stones.  Has been tested for oxalate.     Chronic abdominal pain 2013     Chronic pancreatitis (H) 2013     Depressive disorder, not elsewhere classified     also occ panic spells     Diabetes (H)     post pancreatectomy     Dyspepsia and other specified disorders of function of stomach     H. pylori   treated     Headache(784.0)     still periodic HA's ;  often 5X/week     Hypertension 16    Stress related     Iron deficiency anemia secondary to inadequate dietary iron intake     relates to gastric bypass     Other chronic pain      Post-pancreatectomy diabetes (H) 2013     Pyelonephritis, unspecified , 10/8    L side     Regional enteritis of unspecified site     inacive/remission     Sleep apnea     uses splint     Spasm of sphincter of Oddi     ERCP with Stent of CBD by Fernandez @ [facility] [place] with sx relief     Spasm of sphincter of Oddi     surgical + endoscopic stenting of pancreatic duct @ [facility] 06     Thyroid nodule 2016     Ureteral calculus 10/2/2012     Vaccination not carried out      Past Surgical History:   Procedure Laterality Date     APPENDECTOMY       CHOLECYSTECTOMY       COLONOSCOPY       COMBINED CYSTOSCOPY, RETROGRADES, URETEROSCOPY, LASER HOLMIUM LITHOTRIPSY URETER(S), INSERT STENT Right 3/23/2015    Procedure: COMBINED CYSTOSCOPY, RETROGRADES, URETEROSCOPY, LASER HOLMIUM LITHOTRIPSY URETER(S), INSERT STENT;  Surgeon: Kennedi Aldana MD;  Location:   OR     COMBINED CYSTOSCOPY, RETROGRADES, URETEROSCOPY, LASER HOLMIUM LITHOTRIPSY URETER(S), INSERT STENT Right 4/20/2015    Procedure: COMBINED CYSTOSCOPY, RETROGRADES, URETEROSCOPY, LASER HOLMIUM LITHOTRIPSY URETER(S), INSERT STENT;  Surgeon: Kennedi Aldana MD;  Location: [facility] OR     COSMETIC SURGERY  6/24/2002    Tummy tuck     CYSTOSCOPY, RETROGRADES, INSERT STENT URETER(S), COMBINED  10/2/2012    Procedure: COMBINED CYSTOSCOPY, RETROGRADES, INSERT STENT URETER(S);  COMBINED CYSTOSCOPY,  , INSERT LEFT STENT URETER;  Surgeon: Johny Baez MD;  Location: [facility] OR     ENT SURGERY       ESOPHAGOSCOPY, GASTROSCOPY, DUODENOSCOPY (EGD), COMBINED N/A 1/24/2018    Procedure: COMBINED ESOPHAGOSCOPY, GASTROSCOPY, DUODENOSCOPY (EGD);  ESOPHAGOSCOPY, GASTROSCOPY, DUODENOSCOPY (EGD)    ;  Surgeon: Tamir Rodgers MD;  Location: [facility] GI     EXTRACORPOREAL SHOCK WAVE LITHOTRIPSY (ESWL)  10/16/2012    Procedure: EXTRACORPOREAL SHOCK WAVE LITHOTRIPSY (ESWL);  left EXTRACORPOREAL SHOCK WAVE LITHOTRIPSY (ESWL) ;  Surgeon: Johny Baez MD;  Location: [facility] OR     GI SURGERY  12/29/2015    Small bowel obstruction     GI SURGERY  2003    gastric bypass     HC LAP,LYSIS OF ADHESIONS       HERNIA REPAIR  2/2015     LAPAROSCOPIC LYSIS ADHESIONS N/A 2/20/2015    Procedure: LAPAROSCOPIC LYSIS ADHESIONS;  Surgeon: Aaron Early MD;  Location: [facility] OR     LAPAROSCOPIC LYSIS ADHESIONS N/A 12/29/2015    Procedure: LAPAROSCOPIC LYSIS ADHESIONS;  Surgeon: Aaron Early MD;  Location: [facility] OR     PANCREATECTOMY, TRANSPLANT AUTO ISLET CELL, COMBINED  5/10/2013    Procedure: COMBINED PANCREATECTOMY, TRANSPLANT AUTO ISLET CELL;  Pancreatectomy, Auto Islet Cell Transplant   hernia repair, jejunostomy tube and liver biopsies with Anesthesia General with block;  Surgeon: Aaron Early MD;  Location: [facility] OR     REPAIR PTOSIS BROW BILATERAL Bilateral 6/9/2020    Procedure: BILATERAL BROW PTOSIS REPAIR;  Surgeon: Denise Alberts MD;   Location: [facility] OR     TRANSPLANT  5/10/13     [facility] NONSPECIFIC PROCEDURE      R bunion     [facility] NONSPECIFIC PROCEDURE      T & A     [facility] NONSPECIFIC PROCEDURE      partial ileum resection     [facility] NONSPECIFIC PROCEDURE      R ovarian cyst     [facility] NONSPECIFIC PROCEDURE           [facility] NONSPECIFIC PROCEDURE      GALL BLADDER     [facility] NONSPECIFIC PROCEDURE      CBD stent; Dr. Presley     [facility] NONSPECIFIC PROCEDURE   &     colonoscopy     [facility] NONSPECIFIC PROCEDURE      Gastric bypass     Anesthesia Evaluation     . Pt has had prior anesthetic. Type: General    No history of anesthetic complications          ROS/MED HX    ENT/Pulmonary:     (+)sleep apnea, uses CPAP , . .   (-) tobacco use, asthma, COPD and recent URI   Neurologic:  - neg neurologic ROS    (-) seizures and CVA   Cardiovascular:     (+) hypertension----. : . . . :. . Previous cardiac testing date:results:date: results:ECG reviewed date: results:NSR date: results:         (-) CAD, arrhythmias and valvular problems/murmurs   METS/Exercise Tolerance:     Hematologic: Comments: Lab Test        10/30/20     10/29/20     10/28/20     05/10/16     05/10/16     05/10/13     05/10/13     05/10/13     05/10/13                       0633          0711          4412          2019          1950          1950          0559          0559          WBC          7.4          8.2          12.3*          < >        8.3            < >        10.0          --           --           HGB          10.5*        10.3*        10.7*          < >        12.0           < >        8.1*           < >        10.6*         MCV          96           95           95             < >        92             < >        93            --           --           PLT          275          269          290            < >        383            < >        181           --           --           INR           --           --           --            --          1.03          --          1.29*         --          1.01           < > = values in this interval not displayed.                  Lab Test        10/30/20     10/29/20     10/28/20                       0633          0711          2332          NA           142          142          143           POTASSIUM    4.1          3.5          3.7           CHLORIDE     109          109          109           CO2          29           27           27            BUN          12           23           28            CR           0.86         0.86         1.16*         ANIONGAP     4            6            7             VALARIE          8.1*         8.0*         8.4*          GLC          126*         84           115*         - neg hematologic  ROS       Musculoskeletal:  - neg musculoskeletal ROS       GI/Hepatic:     (+) GERD Asymptomatic on medication, Inflammatory bowel disease,       Renal/Genitourinary:     (+) chronic renal disease, type: CRI, Nephrolithiasis ,       Endo:     (+) type I DM, Using insulin - not using insulin pump Normal glucose range: hx islet cell transplant thyroid problem hypothyroidism, .   (-) chronic steroid usage and obesity   Psychiatric:  - neg psychiatric ROS       Infectious Disease:  - neg infectious disease ROS       Malignancy:      - no malignancy   Other:    (+) H/O Chronic Pain,                        Physical Exam  Normal systems: cardiovascular, pulmonary and dental    Airway   Mallampati: III  TM distance: >3 FB  Neck ROM: full    Dental     Cardiovascular   Rhythm and rate: regular and normal  (-) no friction rub, no systolic click and no murmur    Pulmonary    breath sounds clear to auscultation(-) no rhonchi, no decreased breath sounds, no wheezes, no rales and no stridor            Lab Results   Component Value Date    WBC 7.4 10/30/2020    HGB 10.5 (L) 10/30/2020    HCT 34.5 (L) 10/30/2020     10/30/2020    CRP 21.8 (H) 10/30/2020    SED 13 10/30/2020      "10/30/2020    POTASSIUM 4.1 10/30/2020    CHLORIDE 109 10/30/2020    CO2 29 10/30/2020    BUN 12 10/30/2020    CR 0.86 10/30/2020     (H) 10/30/2020    VALARIE 8.1 (L) 10/30/2020    PHOS 2.9 01/01/2016    MAG 2.0 10/31/2019    ALBUMIN 2.9 (L) 10/28/2020    PROTTOTAL 6.5 (L) 10/28/2020    ALT 25 10/28/2020    AST 18 10/28/2020    ALKPHOS 95 10/28/2020    BILITOTAL 0.3 10/28/2020    LIPASE 44 (L) 10/08/2018    AMYLASE 268 (H) 05/11/2013    PTT 36 05/16/2013    INR 1.03 05/10/2016    FIBR 168 (L) 05/10/2013    TSH 3.04 09/17/2020    T4 1.05 06/05/2020    HCG Negative 05/21/2014    HCGS Negative 12/30/2015       Preop Vitals  BP Readings from Last 3 Encounters:   10/30/20 121/62   10/27/20 (!) 151/82   08/20/20 (!) 147/83    Pulse Readings from Last 3 Encounters:   10/30/20 (!) 48   10/27/20 72   08/20/20 76      Resp Readings from Last 3 Encounters:   10/30/20 18   10/27/20 18   06/13/20 16    SpO2 Readings from Last 3 Encounters:   10/30/20 94%   10/27/20 99%   08/20/20 97%      Temp Readings from Last 1 Encounters:   10/30/20 98.4  F (36.9  C) (Oral)    Ht Readings from Last 1 Encounters:   06/11/20 1.626 m (5' 4\")      Wt Readings from Last 1 Encounters:   10/27/20 59 kg (130 lb)    Estimated body mass index is 22.31 kg/m  as calculated from the following:    Height as of 6/11/20: 1.626 m (5' 4\").    Weight as of 10/27/20: 59 kg (130 lb).       Anesthesia Plan      History & Physical Review  History and physical reviewed and following examination; no interval change.    ASA Status:  3 .    NPO Status:  > 8 hours    Plan for General with Intravenous induction.   PONV prophylaxis:  Ondansetron (or other 5HT-3) and Dexamethasone or Solumedrol         Postoperative Care  Postoperative pain management:  IV analgesics.      Consents  Anesthetic plan, risks, benefits and alternatives discussed with:  Patient or representative and Patient..                 Maninder Mullen MD                    .  "

## 2020-10-30 NOTE — PROGRESS NOTES
[facility]  Infectious Disease Progress Note          Assessment and Plan:   IMPRESSION:   1.  A 55-year-old female with dog bite-related left hand infection, fairly significant looking, mild sepsis, failed oral Augmentin.  Not clear whether any significant tenosynovitis.   2.  History of exocrine pancreatic insufficiency, status post pancreatic islet cell transplant.   3.  Obstructive sleep apnea.   4.  Mild renal insufficiency.   5.  Prior history of gram-negative blas bacteremia 3 years ago, with concern there might be some issues with her biliary duct, but has not had recurrent episodes.      RECOMMENDATIONS:   1.  Continue Zosyn , BC neg abscess cx pending  2.  Orthopedics op noted, not deep well drained, not septic had abscess  3.  Presumptively oral antibiotics when she goes op with no tenosynovitis augmentin 1o days 875 bid, I will Follow-up on cx and adjust        Interval History:   no new complaints and doing well; no cp, sob, n/v/d, or abd pain.              Medications:       acetaminophen  975 mg Oral Q8H     amylase-lipase-protease  5 tablet Oral TID w/meals     calcium carbonate-vitamin D  1 tablet Oral Daily     ceFAZolin  1 g Intravenous Q8H     docusate sodium  100 mg Oral BID     DULoxetine  20 mg Oral Daily     escitalopram  20 mg Oral Daily     gabapentin  300 mg Oral At Bedtime     insulin aspart   Subcutaneous TID w/meals     insulin glargine  6 Units Subcutaneous Daily with supper     lactobacillus rhamnosus (GG)  1 capsule Oral Daily     levothyroxine  100 mcg Oral Daily     omeprazole  40 mg Oral BID     piperacillin-tazobactam  3.375 g Intravenous Q8H     [START ON 10/31/2020] polyethylene glycol  17 g Oral Daily     senna-docusate  1 tablet Oral BID     sodium chloride (PF)  3 mL Intracatheter Q8H     sodium chloride (PF)  3 mL Intracatheter Q8H     sodium chloride (PF)  3 mL Intracatheter Q8H     traZODone  50 mg Oral At Bedtime                  Physical Exam:    Blood pressure 138/69, pulse 52, temperature 98.4  F (36.9  C), temperature source Oral, resp. rate 16, last menstrual period 12/19/2013, SpO2 95 %, not currently breastfeeding.  Wt Readings from Last 2 Encounters:   10/27/20 59 kg (130 lb)   08/20/20 61.2 kg (134 lb 14.7 oz)     Vital Signs with Ranges  Temp:  [96.8  F (36  C)-98.9  F (37.2  C)] 98.4  F (36.9  C)  Pulse:  [48-92] 52  Resp:  [12-19] 16  BP: (113-148)/(53-86) 138/69  Cuff Mean (mmHg):  [76] 76  SpO2:  [94 %-100 %] 95 %    Constitutional: Awake, alert, cooperative, no apparent distress   Lungs: Clear to auscultation bilaterally, no crackles or wheezing   Cardiovascular: Regular rate and rhythm, normal S1 and S2, and no murmur noted   Abdomen: Normal bowel sounds, soft, non-distended, non-tender   Skin: No rashes, no cyanosis, no edema   Other:           Data:   All microbiology laboratory data reviewed.  Recent Labs   Lab Test 10/30/20  0633 10/29/20  0711 10/28/20  2332   WBC 7.4 8.2 12.3*   HGB 10.5* 10.3* 10.7*   HCT 34.5* 33.4* 35.0   MCV 96 95 95    269 290     Recent Labs   Lab Test 10/30/20  0633 10/29/20  0711 10/28/20  2332   CR 0.86 0.86 1.16*     Recent Labs   Lab Test 10/30/20  0633   SED 13     Recent Labs   Lab Test 10/30/20  0940 10/28/20  2344 10/28/20  2332 06/11/20  0833 06/11/20  0646 06/11/20  0645 04/27/19  1204 10/08/18  2343 10/08/18  2332   CULT PENDING  PENDING No growth after 1 day No growth after 1 day >100,000 colonies/mL  Escherichia coli  * No growth No growth >100,000 colonies/mL  Escherichia coli  * No growth No growth

## 2020-10-30 NOTE — PROGRESS NOTES
[facility]  Hospitalist Progress Note  Andrew Fowler MD 10/30/2020    Reason for Stay        Dog bite cellulitis    Left hand abscess status post incision and drainage         Assessment and Plan:        Summary of Stay:     Lynn Thompson is a 55 year old female with a PMHx of pancreatic auto islet cell transplant c/b IDDM, Crohn's disease s/p ileal resection, BLU, chronic pain, iron def anemia, Hypothyroidism, MDD, who presents with hand infection after a dog bite.      Patient reports that this happened on the morning of 10/27.  It appears that her dog was playing with her daughter's dog, when her dog bit her.  She reports that her dog is up-to-date on vaccines, and is not showing any abnormal signs.  She was seen in the ER after this and was sent home with a course of Augmentin.  However, since that time she has noticed the redness and swelling to be getting more worse.  .    Patient progress during stay:    Patient was admitted and was treated with intravenous antibiotic with Zosyn and patient was closely monitored.  Infectious disease and hand surgery were consulted to assist with further recommendations.    Patient had incision drainage done by Dr.Ryan Myra MD on October 30, 2020 and she remained stable on intravenous antibiotic.      Problem List with Assessment and Plan      1. Left hand dog bite cellulitis the left hand abscess status post incision and drainage postoperative day #0    Stable postop plan pain is controlled.  No numbness or worsening of pain.    On IV Zosyn, cultures pending    Input from hand  surgeon appreciated    Continue pain control, antibiotic can be changed to 10 days of oral Augmentin tomorrow if she remains stable for discharge.  She was advised to elevate affected hand and seen in Dr. Renteria's clinic 10-14 days    2. Mild acute kidney injury: Resolved      3.      Chronic medical problems    #BLU - nightly CPAP  #Exocrine pancreatic  insufficiency s/p pancreatic auto islet cell transplant 2013   #Post pancreatectomy IDDM. A1C 6.7. On low dose Lantus at baseline. Will monitor sugars in hospital. If start to become elevated, then add SSI  #Hypothyroidism. synthroid  #GERD. Prilosec  #Chronic pain  #MDD          Plan for today :    Continue postoperative pain control plan start as needed oral oxycodone    Monitor cultures    Elevate affected hand        LDA     Access : Peripheral     Gonzalez Catheter: not present        FEN :    Orders Placed This Encounter      Advance Diet as Tolerated: Regular Diet Adult           Intake/Output Summary (Last 24 hours) at 10/30/2020 1325  Last data filed at 10/30/2020 1200  Gross per 24 hour   Intake 1931 ml   Output 610 ml   Net 1321 ml          DVT Prophylaxis: Ambulate every shift    Code Status:  Full Code    Estimated discharge  disposition and plan:  May discharge  to home in tomorrow if she remains afebrile and stable           Interval History (Subjective):        Patient is seen and examined by me today and medical record reviewed.Overnight events noted and care discussed with nursing staff.  Doing well postoperatively.  Pain controlled.  No fever or chills.                  Physical Exam:        Last Vital Signs:  /69   Pulse 52   Temp 98.4  F (36.9  C) (Oral)   Resp 16   LMP 12/19/2013   SpO2 95%     Wt Readings from Last 1 Encounters:   10/27/20 59 kg (130 lb)       Wt Readings from Last 5 Encounters:   10/27/20 59 kg (130 lb)   08/20/20 61.2 kg (134 lb 14.7 oz)   08/20/20 61.2 kg (134 lb 14.7 oz)   08/17/20 60.3 kg (133 lb)   06/11/20 67.4 kg (148 lb 9.6 oz)        Constitutional: Awake, alert, cooperative, no apparent distress     Respiratory: Clear to auscultation bilaterally, no crackles or wheezing   Cardiovascular: Regular rate and rhythm, normal S1 and S2, and no murmur noted   Abdomen: Normal bowel sounds, soft, non-distended, non-tender   Skin: No rashes, no cyanosis, dry to touch    Neuro: Alert with  no weakness, numbness, memory loss   Extremities:  Dressed left hand.  Able to move her fingers.  No sensory or motor deficit   Other(s):        All other systems: Negative          Medications:        All current medications were reviewed with changes reflected in problem list.         Data:      All new lab and imaging data was reviewed.      Data reviewed today: I reviewed all new labs and imaging results over the last 24 hours. I personally reviewed no images or EKG's today      Recent Labs   Lab 10/30/20  0633 10/29/20  0711 10/28/20  2332   WBC 7.4 8.2 12.3*   HGB 10.5* 10.3* 10.7*   HCT 34.5* 33.4* 35.0   MCV 96 95 95    269 290     Recent Labs   Lab 10/30/20  0940 10/28/20  2344 10/28/20  2332   CULT PENDING  PENDING No growth after 1 day No growth after 1 day     Recent Labs   Lab 10/30/20  0633 10/29/20  0711 10/28/20  2332    142 143   POTASSIUM 4.1 3.5 3.7   CHLORIDE 109 109 109   CO2 29 27 27   ANIONGAP 4 6 7   * 84 115*   BUN 12 23 28   CR 0.86 0.86 1.16*   GFRESTIMATED 75 75 52*   GFRESTBLACK 87 86 60*   VALARIE 8.1* 8.0* 8.4*   PROTTOTAL  --   --  6.5*   ALBUMIN  --   --  2.9*   BILITOTAL  --   --  0.3   ALKPHOS  --   --  95   AST  --   --  18   ALT  --   --  25       Recent Labs   Lab 10/30/20  1148 10/30/20  0818 10/30/20  0633 10/30/20  0105 10/29/20  2203 10/29/20  1820 10/29/20  0711 10/29/20  0711 10/28/20  2332 10/28/20  2332   GLC  --   --  126*  --   --   --   --  84  --  115*   * 129*  --  179* 275* 104*   < >  --    < >  --     < > = values in this interval not displayed.       No results for input(s): INR in the last 168 hours.      No results for input(s): TROPONIN, TROPI, TROPR in the last 168 hours.    Invalid input(s): TROP, TROPONINIES    Recent Results (from the past 48 hour(s))   XR Hand Left G/E 3 Views    Narrative    LEFT HAND THREE OR MORE VIEWS  10/29/2020 1:57 PM     HISTORY:  Left hand dog bite, evaluate for fracture or foreign  body.    COMPARISON: None.      Impression    IMPRESSION: Dorsal soft tissue swelling. No evidence of fracture or  foreign body.    GARY YBARRA MD       COVID Status:  COVID-19 PCR Results    COVID-19 PCR Results 6/6/20 6/11/20 6/11/20 10/29/20     0922 0922    COVID-19 Virus PCR to [facility] - Result Not Detected Test received-See reflex to IDDL test SARS CoV2 (COVID-19) Virus RT-PCR  Not Detected   COVID-19 Virus PCR to [facility] - Source Nasopharyngeal Nasopharyngeal  Nasopharyngeal   SARS-CoV-2 Virus Specimen Source   Nasopharyngeal    SARS-CoV-2 PCR Result   NEGATIVE       Comments are available for some flowsheets but are not being displayed.         COVID-19 Antibody Results, Testing for Immunity    COVID-19 Antibody Results, Testing for Immunity   No data to display.              Disclaimer: This note consists of symbols derived from keyboarding, dictation and/or voice recognition software. As a result, there may be errors in the script that have gone undetected. Please consider this when interpreting information found in this chart.

## 2020-10-30 NOTE — OP NOTE
Procedure Date: 10/30/2020      PREOPERATIVE DIAGNOSIS:  Left hand abscess, status post dog bite.      POSTOPERATIVE DIAGNOSIS:  Left hand abscess, status post dog bite.      PROCEDURE PERFORMED:  Left hand sharp excisional debridement of skin, subcutaneous tissue and fat with a scalpel, 2 x 1 x 1 cm.      SURGEON:  Robby Cohn MD      ASSISTANT:  UGO Mattson, whose assistance was required for positioning, closure, dressing.      ANESTHESIA:  General.      ESTIMATED BLOOD LOSS:  5 mL      FINDINGS:  Abscess deep within the hand, drained and sent for culture.      INDICATIONS:  A 55-year-old female who was breaking up a fight with dog was bit on the left hand 3 days ago with increasing swelling, erythema and drainage, not responding to IV antibiotics.  Risks, benefits, alternatives explained, she elected to proceed with above.      DESCRIPTION OF PROCEDURE:  The patient identified in preop holding area per hospital policy.  Correct operative site marked, to the OR onto the OR table.  General anesthesia induced.  Chlorhexidine prescrub.  SHE HAS A BETADINE ALLERGY.  She was draped.  A timeout was performed and all in the room agreed.        Ellipsed bite wound sharp excisional debridement with scalpel skin, subcutaneous tissue and fat 2 x 1 x 1 cm.  Probed the depths of the wound with a hemostat, breaking up a large purulent loculation of abscess deep within the hand.  This was drained and copiously irrigated with normal saline with Asepto.  Closed very loosely with a single 4-0 nylon suture.  Sterile dressings applied.  Awakened, transferred stable to PACU.      POSTOPERATIVE PLAN:   1.  BID Hibiclens soaks.   2.  IV antibiotics overnight.   3.  Likely discharge home tomorrow if improving, on 10 days of oral Augmentin.   4.  Elevation.   5.  Follow-up with Dr. Cohn's clinic 10-14 days.         ROBBY COHN MD             D: 10/30/2020   T: 10/30/2020   MT: AQUILES      Name:     JAYDEN  ANGEL   MRN:      [id number]        Account:        [id number]   :      1965           Procedure Date: 10/30/2020      Document: [id number]

## 2020-10-30 NOTE — ANESTHESIA POSTPROCEDURE EVALUATION
Patient: Lynn Thompson    Procedure(s):  Incison and drainage left hand    Diagnosis:Dog bite [W54.0XXA]  Diagnosis Additional Information: No value filed.    Anesthesia Type:  General    Note:  Anesthesia Post Evaluation    Patient location during evaluation: PACU  Patient participation: Able to fully participate in evaluation  Level of consciousness: awake  Pain management: adequate  Cardiovascular status: acceptable  Respiratory status: acceptable  Hydration status: acceptable  PONV: controlled     Anesthetic complications: None          Last vitals:  Vitals:    10/30/20 1057 10/30/20 1113 10/30/20 1200   BP: 123/64 119/63 138/69   Pulse:  (!) 48 52   Resp:      Temp:      SpO2:  94% 95%         Electronically Signed By: Maninder Mullen MD  October 30, 2020  12:33 PM

## 2020-10-30 NOTE — PLAN OF CARE
Occupational Therapy Discharge Summary    Reason for therapy discharge:    All goals and outcomes met, no further needs identified.    Progress towards therapy goal(s). See goals on Care Plan in [facility] electronic health record for goal details.  Goals met    Therapy recommendation(s):    No further therapy is recommended.

## 2020-10-30 NOTE — BRIEF OP NOTE
[facility]    Brief Operative Note    Pre-operative diagnosis: Dog bite [W54.0XXA]  Post-operative diagnosis Same as pre-operative diagnosis    Procedure: Procedure(s):  Incison and drainage left hand  Surgeon: Surgeon(s) and Role:     * Demian Renteria MD - Primary  Anesthesia: General   Estimated blood loss: Less than 10 ml  Drains: None  Specimens:   ID Type Source Tests Collected by Time Destination   1 : left Hand abscesaerobic, anaerobic fluid culture Fluid Hand, Left ANAEROBIC BACTERIAL CULTURE, FLUID CULTURE AEROBIC BACTERIAL Demian Renteria MD 10/30/2020  9:40 AM      Findings:   None.  Complications: None.  Implants: * No implants in log *      Elevate hand  BID hibiclens soaks  IV abx  Likely home Sat on 10 days PO abx

## 2020-10-31 VITALS
OXYGEN SATURATION: 95 % | DIASTOLIC BLOOD PRESSURE: 57 MMHG | RESPIRATION RATE: 16 BRPM | SYSTOLIC BLOOD PRESSURE: 113 MMHG | HEART RATE: 50 BPM | TEMPERATURE: 97.7 F

## 2020-10-31 LAB
ERYTHROCYTE [DISTWIDTH] IN BLOOD BY AUTOMATED COUNT: 14.2 % (ref 10–15)
GLUCOSE BLDC GLUCOMTR-MCNC: 107 MG/DL (ref 70–99)
GLUCOSE BLDC GLUCOMTR-MCNC: 142 MG/DL (ref 70–99)
HCT VFR BLD AUTO: 34.5 % (ref 35–47)
HGB BLD-MCNC: 10.3 G/DL (ref 11.7–15.7)
MCH RBC QN AUTO: 29.2 PG (ref 26.5–33)
MCHC RBC AUTO-ENTMCNC: 29.9 G/DL (ref 31.5–36.5)
MCV RBC AUTO: 98 FL (ref 78–100)
PLATELET # BLD AUTO: 296 10E9/L (ref 150–450)
RBC # BLD AUTO: 3.53 10E12/L (ref 3.8–5.2)
WBC # BLD AUTO: 6.1 10E9/L (ref 4–11)

## 2020-10-31 PROCEDURE — 250N000013 HC RX MED GY IP 250 OP 250 PS 637: Performed by: ORTHOPAEDIC SURGERY

## 2020-10-31 PROCEDURE — 250N000013 HC RX MED GY IP 250 OP 250 PS 637: Performed by: INTERNAL MEDICINE

## 2020-10-31 PROCEDURE — 999N001017 HC STATISTIC GLUCOSE BY METER IP

## 2020-10-31 PROCEDURE — 250N000011 HC RX IP 250 OP 636: Performed by: ORTHOPAEDIC SURGERY

## 2020-10-31 PROCEDURE — 36415 COLL VENOUS BLD VENIPUNCTURE: CPT | Performed by: INTERNAL MEDICINE

## 2020-10-31 PROCEDURE — 85027 COMPLETE CBC AUTOMATED: CPT | Performed by: INTERNAL MEDICINE

## 2020-10-31 PROCEDURE — 99239 HOSP IP/OBS DSCHRG MGMT >30: CPT | Performed by: INTERNAL MEDICINE

## 2020-10-31 RX ORDER — OXYCODONE HYDROCHLORIDE 5 MG/1
5 TABLET ORAL EVERY 6 HOURS PRN
Qty: 20 TABLET | Refills: 0 | Status: SHIPPED | OUTPATIENT
Start: 2020-10-31 | End: 2021-03-15

## 2020-10-31 RX ADMIN — TAZOBACTAM SODIUM AND PIPERACILLIN SODIUM 3.38 G: 375; 3 INJECTION, SOLUTION INTRAVENOUS at 16:09

## 2020-10-31 RX ADMIN — ACETAMINOPHEN 975 MG: 325 TABLET, FILM COATED ORAL at 10:32

## 2020-10-31 RX ADMIN — ACETAMINOPHEN 975 MG: 325 TABLET, FILM COATED ORAL at 03:34

## 2020-10-31 RX ADMIN — ESCITALOPRAM OXALATE 20 MG: 20 TABLET ORAL at 08:04

## 2020-10-31 RX ADMIN — INSULIN ASPART 2 UNITS: 100 INJECTION, SOLUTION INTRAVENOUS; SUBCUTANEOUS at 14:03

## 2020-10-31 RX ADMIN — OXYCODONE HYDROCHLORIDE 5 MG: 5 TABLET ORAL at 08:09

## 2020-10-31 RX ADMIN — OMEPRAZOLE 40 MG: 20 CAPSULE, DELAYED RELEASE ORAL at 08:03

## 2020-10-31 RX ADMIN — Medication 1 TABLET: at 08:02

## 2020-10-31 RX ADMIN — OXYCODONE HYDROCHLORIDE 5 MG: 5 TABLET ORAL at 03:35

## 2020-10-31 RX ADMIN — TAZOBACTAM SODIUM AND PIPERACILLIN SODIUM 3.38 G: 375; 3 INJECTION, SOLUTION INTRAVENOUS at 08:19

## 2020-10-31 RX ADMIN — HYDROXYZINE HYDROCHLORIDE 25 MG: 25 TABLET, FILM COATED ORAL at 03:35

## 2020-10-31 RX ADMIN — LEVOTHYROXINE SODIUM 100 MCG: 0.1 TABLET ORAL at 08:03

## 2020-10-31 RX ADMIN — DOCUSATE SODIUM 50 MG AND SENNOSIDES 8.6 MG 1 TABLET: 8.6; 5 TABLET, FILM COATED ORAL at 08:03

## 2020-10-31 RX ADMIN — PANCRELIPASE 5 TABLET: 20880; 78300; 78300 TABLET ORAL at 13:57

## 2020-10-31 RX ADMIN — PANCRELIPASE 5 TABLET: 20880; 78300; 78300 TABLET ORAL at 10:32

## 2020-10-31 RX ADMIN — CEFAZOLIN SODIUM 1 G: 1 INJECTION, SOLUTION INTRAVENOUS at 01:51

## 2020-10-31 RX ADMIN — Medication 1 CAPSULE: at 08:03

## 2020-10-31 RX ADMIN — OXYCODONE HYDROCHLORIDE 5 MG: 5 TABLET ORAL at 12:24

## 2020-10-31 RX ADMIN — DULOXETINE HYDROCHLORIDE 20 MG: 20 CAPSULE, DELAYED RELEASE ORAL at 08:03

## 2020-10-31 RX ADMIN — INSULIN ASPART 2 UNITS: 100 INJECTION, SOLUTION INTRAVENOUS; SUBCUTANEOUS at 10:35

## 2020-10-31 NOTE — PROGRESS NOTES
[facility]  Infectious Disease Progress Note          Assessment and Plan:   IMPRESSION:   1.  A 55-year-old female with dog bite-related left hand infection, fairly significant looking, mild sepsis, failed oral Augmentin.  Not clear whether any significant tenosynovitis.   2.  History of exocrine pancreatic insufficiency, status post pancreatic islet cell transplant.   3.  Obstructive sleep apnea.   4.  Mild renal insufficiency.   5.  Prior history of gram-negative blas bacteremia 3 years ago, with concern there might be some issues with her biliary duct, but has not had recurrent episodes.      RECOMMENDATIONS:   1.  Continue Zosyn while here but po ready , BC neg abscess cx neg so far  2.  Orthopedics op noted, not deep well drained, not septic had abscess  3.  OK oral antibiotics ,  op with no tenosynovitis, augmentin 10 days 875 bid, I will Follow-up on cx and adjust as outpt        Interval History:   no new complaints and doing well; no cp, sob, n/v/d, or abd pain. cx pending              Medications:       acetaminophen  975 mg Oral Q8H     amylase-lipase-protease  5 tablet Oral TID w/meals     calcium carbonate-vitamin D  1 tablet Oral Daily     docusate sodium  100 mg Oral BID     DULoxetine  20 mg Oral Daily     escitalopram  20 mg Oral Daily     gabapentin  300 mg Oral At Bedtime     insulin aspart   Subcutaneous TID w/meals     insulin glargine  6 Units Subcutaneous Daily with supper     lactobacillus rhamnosus (GG)  1 capsule Oral Daily     levothyroxine  100 mcg Oral Daily     omeprazole  40 mg Oral BID     piperacillin-tazobactam  3.375 g Intravenous Q8H     polyethylene glycol  17 g Oral Daily     senna-docusate  1 tablet Oral BID     sodium chloride (PF)  3 mL Intracatheter Q8H     traZODone  50 mg Oral At Bedtime                  Physical Exam:   Blood pressure 113/57, pulse 50, temperature 97.7  F (36.5  C), temperature source Oral, resp. rate 16, last menstrual period  12/19/2013, SpO2 95 %, not currently breastfeeding.  Wt Readings from Last 2 Encounters:   10/27/20 59 kg (130 lb)   08/20/20 61.2 kg (134 lb 14.7 oz)     Vital Signs with Ranges  Temp:  [97.7  F (36.5  C)-98.6  F (37  C)] 97.7  F (36.5  C)  Pulse:  [50-56] 50  Resp:  [16-20] 16  BP: (111-120)/(57-67) 113/57  SpO2:  [95 %-96 %] 95 %    Constitutional: Awake, alert, cooperative, no apparent distress   Lungs: Clear to auscultation bilaterally, no crackles or wheezing   Cardiovascular: Regular rate and rhythm, normal S1 and S2, and no murmur noted   Abdomen: Normal bowel sounds, soft, non-distended, non-tender   Skin: No rashes, no cyanosis, no edema   Other:           Data:   All microbiology laboratory data reviewed.  Recent Labs   Lab Test 10/31/20  0622 10/30/20  0633 10/29/20  0711   WBC 6.1 7.4 8.2   HGB 10.3* 10.5* 10.3*   HCT 34.5* 34.5* 33.4*   MCV 98 96 95    275 269     Recent Labs   Lab Test 10/30/20  0633 10/29/20  0711 10/28/20  2332   CR 0.86 0.86 1.16*     Recent Labs   Lab Test 10/30/20  0633   SED 13     Recent Labs   Lab Test 10/30/20  0940 10/28/20  2344 10/28/20  2332 06/11/20  0833 06/11/20  0646 06/11/20  0645 04/27/19  1204 10/08/18  2343 10/08/18  2332   CULT Culture negative monitoring continues  Culture negative monitoring continues No growth after 2 days No growth after 2 days >100,000 colonies/mL  Escherichia coli  * No growth No growth >100,000 colonies/mL  Escherichia coli  * No growth No growth

## 2020-10-31 NOTE — DISCHARGE SUMMARY
[facility]  Discharge Summary  Hospitalist      Date of Admission:  10/28/2020  Date of Discharge:  10/31/2020  Provider:  Norman Negron MD. [facility]  Date of Service (when I last saw the patient): 10/31/20      Primary Provider: Maryana Brooks          Discharge Diagnosis:     Discharge Diagnoses   Left hand dog bite cellulitis and abscess status post incision and drainage on 10/30  Mild acute kidney injury      Other medical issues:  Past Medical History:   Diagnosis Date     Abdominal pain, epigastric 11/05, ongoing; goes to pain clinic    probable recurrent spasm sphincter of Oddi causing biliary colic pains      Benign paroxysmal positional vertigo     occ.      Calculus of kidney 5/05    x1 on L side passed, several stones.  Has been tested for oxalate.     Chronic abdominal pain 7/17/2013     Chronic pancreatitis (H) 7/17/2013     Depressive disorder, not elsewhere classified     also occ panic spells     Diabetes (H)     post pancreatectomy     Dyspepsia and other specified disorders of function of stomach 6/99    H. 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     Hospital Course     Lynn Thompson was admitted on  10/28/2020.  The following problems were addressed during her hospitalization:  55-year-old female with dog bite-related left hand infection, fairly significant looking, mild sepsis, failed oral Augmentin, possible tenosynovitis.   History of exocrine pancreatic insufficiency, status post pancreatic islet cell transplant.  Also has history of obstructive sleep apnea, mild renal insufficiency, and prior history of gram-negative blas bacteremia 3 years ago, with concern there might be some issues with her biliary duct, but has not had recurrent episodes.      Problem List:   1. Left hand dog bite cellulitis the left hand abscess status post incision and drainage postoperative on 10/30    Stable postop plan pain is controlled.  No numbness or worsening of pain.    On IV Zosyn while in the hospital, cultures so far negative, followed by infectious disease and hand surgery    Continue pain control, antibiotic can be changed to 10 days of oral Augmentin per infectious disease on discharge   She was advised to elevate affected hand and seen in Dr. Renteria's clinic 10-14 days    Daily dressing change    2. Mild acute kidney injury: Resolved      3.      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  #Hypothyroidism. synthroid  #GERD. Prilosec  #Chronic pain  #MDD    Pending Results   Unresulted Labs Ordered in the Past 30 Days of this Admission     Date and Time Order Name Status Description    10/30/2020 0942 Fluid Culture Aerobic Bacterial Preliminary     10/30/2020 0942 Anaerobic bacterial culture Preliminary     10/28/2020 2251 Blood culture Preliminary     10/28/2020 2251 Blood culture Preliminary           Discharge Orders      Reason for your hospital stay    Dog bite with hand infection requiring surgery     Follow-up and recommended labs and tests     Follow-up with orthopedics in 1 week      Diet    Follow this diet upon discharge: Orders Placed This Encounter      Advance Diet as Tolerated: Regular Diet Adult       Code Status   Full Code       Primary Care Physician   Maryana Brooks    Physical Exam   Temp: 97.7  F (36.5  C) Temp src: Oral BP: 113/57 Pulse: 50   Resp: 16 SpO2: 95 % O2 Device: None (Room air)    There were no vitals filed for this visit.  Vital Signs with Ranges  Temp:  [97.7  F (36.5  C)-98.6  F (37  C)] 97.7  F (36.5  C)  Pulse:  [50-56] 50  Resp:  [16-20] 16  BP: (111-120)/(57-67) 113/57  SpO2:  [95 %-96 %] 95 %  I/O last 3 completed shifts:  In: 1040 [P.O.:940; I.V.:100]  Out: 10 [Blood:10]    Constitutional:  alert, cooperative, no apparent distress  Respiratory: No increased work of breathing, good air exchange, no crackles or wheezing.  Cardiovascular: apical impulse,normal S1 and S2  GI: bowel sounds present, soft, non-distended, non-tender      Discharge Disposition   Discharged to home    Consultations This Hospital Stay   ORTHOPEDIC SURGERY IP CONSULT  INFECTIOUS DISEASES IP CONSULT  OCCUPATIONAL THERAPY ADULT IP CONSULT  PHYSICAL THERAPY ADULT IP CONSULT    Time Spent on this Encounter   I, Norman Negron MD, personally saw the patient today and spent greater than 30 minutes discharging this patient.      Discharge Medications   Current Discharge Medication List      START taking these medications    Details   oxyCODONE (ROXICODONE) 5 MG tablet Take 1 tablet (5 mg) by mouth every 6 hours as needed for moderate to severe pain  Qty: 20 tablet, Refills: 0    Associated Diagnoses: Dog bite, initial encounter         CONTINUE these medications which have CHANGED    Details   amoxicillin-clavulanate (AUGMENTIN) 875-125 MG tablet Take 1 tablet by mouth 2 times daily  Qty: 20 tablet, Refills: 0    Associated Diagnoses: Dog bite, initial encounter         CONTINUE these medications which have NOT CHANGED    Details   acetaminophen (TYLENOL) 325 MG tablet Take 325 mg  by mouth every 6 hours as needed for pain       !! amylase-lipase-protease (VIOKACE) 34930 units TABS tablet Take 4-5 with meals and 2 with snacks  Qty: 500 tablet, Refills: 11    Associated Diagnoses: Pancreatic insufficiency      !! amylase-lipase-protease (VIOKACE) 60579 units TABS tablet Take 2 capsules by mouth Take with snacks or supplements      blood glucose (BILL MICROLET 2) lancing device Use to test blood sugars 6 times daily or as directed.  Qty: 1 each, Refills: 11    Associated Diagnoses: Absence of pancreas, acquired      blood glucose monitoring (BILL CONTOUR NEXT) test strip Check BS 4-6 times a day  Qty: 2 Box, Refills: 6    Associated Diagnoses: Absence of pancreas, acquired      calcium carbonate 600 mg-vitamin D 400 units (CALTRATE) 600-400 MG-UNIT per tablet Take 1 tablet by mouth daily      DULoxetine (CYMBALTA) 20 MG capsule Take 20 mg by mouth daily      escitalopram (LEXAPRO) 20 MG tablet Take 20 mg by mouth daily      estradiol (ESTRACE) 0.1 MG/GM vaginal cream PLACE 2 GRAMS VAGINALLY TWICE A WEEK  Qty: 42.5 g, Refills: 11    Associated Diagnoses: Vaginal atrophy      famotidine (PEPCID) 40 MG tablet Take 1 tablet (40 mg) by mouth 2 times daily as needed for heartburn  Qty: 180 tablet, Refills: 3    Associated Diagnoses: Gastroesophageal reflux disease with esophagitis      gabapentin (NEURONTIN) 300 MG capsule Take 300 mg by mouth nightly as needed      glucose 40 % GEL Take 15-30 g by mouth every 15 minutes as needed.  Qty: 1 Tube, Refills: 11    Associated Diagnoses: Chronic pancreatitis (H)      hydrOXYzine (ATARAX) 25 MG tablet TAKE 1-2 TABLETS BY MOUTH 2 TIMES DAILY AS NEEDED FOR ANXIETY  Refills: 0      !! insulin aspart (NOVOLOG PEN) 100 UNIT/ML pen Inject Subcutaneous 3 times daily (with meals) Sliding scale: BGs             151-250         251-350             No carbs          0 units         0.5units         1 unit           10-19g carbs       0.5units        1 unit              1.5units           20-29g carbs       1 unit            1.5units          2 units           30-39g carbs       1.5units        2 units           2.5 units           40-49g carbs       2 units          2.5units        3 units           >50g carbs         2.5units         3 units            3.5 units      !! insulin aspart (NOVOLOG PEN) 100 UNIT/ML pen Inject Subcutaneous 3 times daily (with meals) Sliding scale in addition to meal coverage:  0.5 unit(s) for every 100mg/dL above 150mg/dL      insulin glargine (LANTUS PEN) 100 UNIT/ML pen Inject 6 Units Subcutaneous daily (with dinner)    Comments: If Lantus is not covered by insurance, may substitute Basaglar at same dose and frequency.        lactobacillus rhamnosus, GG, (CULTURELL) capsule Take 1 capsule by mouth daily      levothyroxine (SYNTHROID/LEVOTHROID) 100 MCG tablet Take 1 tablet (100 mcg) by mouth daily  Qty: 90 tablet, Refills: 0    Comments: Patient to see provider before next refill.  Associated Diagnoses: Acquired hypothyroidism      loratadine (CLARITIN) 10 MG tablet Take 10 mg by mouth daily as needed       methylcellulose (CITRUCEL) 500 MG TABS tablet Take 500 mg by mouth daily      modafinil (PROVIGIL) 200 MG tablet Take 400 mg by mouth daily      omeprazole (PRILOSEC) 40 MG DR capsule Take 1 capsule (40 mg) by mouth 2 times daily Take 30-60 minutes before a meal.  Qty: 180 capsule, Refills: 3    Associated Diagnoses: Gastroesophageal reflux disease without esophagitis      ondansetron (ZOFRAN-ODT) 8 MG ODT tab Take 8 mg by mouth 3 times daily as needed for nausea      Sharps Container MISC For insulin needles  Qty: 1 each, Refills: prn    Associated Diagnoses: Absence of pancreas, acquired      traZODone (DESYREL) 50 MG tablet Take 50 mg by mouth nightly as needed for sleep      !! UNABLE TO FIND MEDICATION NAME: Dopatone Active      !! UNABLE TO FIND MEDICATION NAME: magnezyme      !! UNABLE TO FIND MEDICATION NAME: immunoG PRP      !!  UNABLE TO FIND MEDICATION NAME: GI-Synergy      Continuous Blood Gluc  (DEXCOM G6 ) MODESTA Use as directed to check blood glucose levels  Qty: 1 Device, Refills: 1    Associated Diagnoses: Post-pancreatectomy diabetes (H)      Continuous Blood Gluc Sensor (DEXCOM G6 SENSOR) MISC Change every 10 days  Qty: 3 each, Refills: 11    Associated Diagnoses: Post-pancreatectomy diabetes (H)      Continuous Blood Gluc Transmit (DEXCOM G6 TRANSMITTER) MISC Change every 3 months  Qty: 1 each, Refills: 3    Associated Diagnoses: Post-pancreatectomy diabetes (H)      insulin pen needle (BD MAHESH U/F) 32G X 4 MM Use up to 3 times daily as needed for insulin dosing  Qty: 100 each, Refills: prn    Associated Diagnoses: Acquired absence of pancreas       !! - Potential duplicate medications found. Please discuss with provider.      STOP taking these medications       fluconazole (DIFLUCAN) 150 MG tablet Comments:   Reason for Stopping:             Allergies   Allergies   Allergen Reactions     Corticosteroids Other (See Comments)     All oral, IV and injectable steroids are contraindicated (unless in life threatening situations) in Islet Auto transplant recipients. They can cause irreversible loss of islet cell function. Please contact patient's transplant care coordinator, Erlinda Multani RN BSN at [phone]/pager: [phone] and endocrinologist prior to administration.       Povidone Iodine Hives     Causes skin to blister     Naproxen      Other reaction(s): Abdominal Pain  Pt allergic to Naprosyn     Nsaids      naprosyn = GI upset     Povidone Iodine      blisters     Sulfasalazine Nausea and Nausea and Vomiting     Data   Most Recent 3 CBC's:  Recent Labs   Lab Test 10/31/20  0622 10/30/20  0633 10/29/20  0711   WBC 6.1 7.4 8.2   HGB 10.3* 10.5* 10.3*   MCV 98 96 95    275 269      Most Recent 3 BMP's:  Recent Labs   Lab Test 10/30/20  0633 10/29/20  0711 10/28/20  2332    142 143   POTASSIUM  4.1 3.5 3.7   CHLORIDE 109 109 109   CO2 29 27 27   BUN 12 23 28   CR 0.86 0.86 1.16*   ANIONGAP 4 6 7   VALARIE 8.1* 8.0* 8.4*   * 84 115*     Most Recent 2 LFT's:  Recent Labs   Lab Test 10/28/20  2332 08/20/20  1020   AST 18 20   ALT 25 29   ALKPHOS 95 106   BILITOTAL 0.3 0.4     Most Recent INR's and Anticoagulation Dosing History:  Anticoagulation Dose History     Recent Dosing and Labs Latest Ref Rng & Units 1/8/2011 5/8/2013 5/10/2013 5/10/2013 5/10/2016    INR 0.86 - 1.14 1.05 1.01 1.01 1.29(H) 1.03        Most Recent 3 Troponin's:  Recent Labs   Lab Test 03/08/19  1236 11/08/16  1915 12/01/14  1545   TROPI <0.015 <0.015  The 99th percentile for upper reference range is 0.045 ug/L.  Troponin values in   the range of 0.045 - 0.120 ug/L may be associated with risks of adverse   clinical events.   <0.015  The 99th percentile for upper reference range is 0.045 ug/L.  Troponin values in   the range of 0.045 - 0.120 ug/L may be associated with risks of adverse   clinical events.   Effective 7/30/2014, the reference range for this assay has changed to reflect   new instrumentation/methodology.       Most Recent Cholesterol Panel:  Recent Labs   Lab Test 09/17/20  0727   CHOL 192   LDL 84   HDL 91   TRIG 83     Most Recent 6 Bacteria Isolates From Any Culture (See EPIC Reports for Culture Details):  Recent Labs   Lab Test 10/30/20  0940 10/28/20  2344 10/28/20  2332 06/11/20  0833 06/11/20  0646 06/11/20  0645   CULT Culture negative monitoring continues  Culture negative monitoring continues No growth after 2 days No growth after 2 days >100,000 colonies/mL  Escherichia coli  * No growth No growth     Most Recent TSH, T4 and A1c Labs:  Recent Labs   Lab Test 09/17/20  0727 08/20/20  1020 06/05/20  1055   TSH 3.04  --  4.25*   T4  --   --  1.05   A1C  --  6.7* 7.1*     Results for orders placed or performed during the hospital encounter of 10/28/20   XR Hand Left G/E 3 Views    Narrative    LEFT HAND THREE OR  MORE VIEWS  10/29/2020 1:57 PM     HISTORY:  Left hand dog bite, evaluate for fracture or foreign body.    COMPARISON: None.      Impression    IMPRESSION: Dorsal soft tissue swelling. No evidence of fracture or  foreign body.    GARY YBARRA MD     *Note: Due to a large number of results and/or encounters for the requested time period, some results have not been displayed. A complete set of results can be found in Results Review.           Disclaimer: This note consists of symbols derived from keyboarding, dictation and/or voice recognition software. As a result, there may be errors in the script that have gone undetected. Please consider this when interpreting information found in this chart.

## 2020-10-31 NOTE — PLAN OF CARE
Vital signs: Stable  Pain/comfort: Improved with Tylenol, Oxycodone and elevation  Assessment: Mild edema and swelling; no concerns with CWMS  Nutrition: Tolerating general diet; carb controlled  Output: Voiding  Activity/ambulation: Ambulating independently  Social: In contact with family  Plan: Monitor hand; pain control; continue IV antibiotics

## 2020-10-31 NOTE — PLAN OF CARE
Left fingers appear less swollen. Left hand and wrist wrapped with dressing and ace wrap. No drainage noted. Able to wiggle fingers which are warm to touch with brisk capillary refill. Rated pain as 6. Tylenol, Oxycodone, and atarax decreased the pain. Plan for AM lab draw,. Elevated left arm on pillows. BID Hibiclens soaks as in  Note. Strict I&O. PT. Monitor FSG.

## 2020-10-31 NOTE — PLAN OF CARE
Adequate for discharge. Discharge teaching completed, questions and concerns answered, medications reviewed and resources provided. Pt discharging home with Spouse.

## 2020-10-31 NOTE — PROGRESS NOTES
[facility]  Hospitalist Progress Note  Admit 10/28/2020  9:54 PM    Name: Lynn Thompson    MRN: [id number]  Provider:  Norman Negron MD, Formerly [facility], [facility]    Date of Service: 10/31/2020     Reason for Stay (Diagnosis): Left hand infection after dog bite         Summary of hospital stay & Assessment/Plan:   Summary of Stay: Lynn Thompson is a 55 year old female who was admitted on 10/28/2020  55-year-old female with dog bite-related left hand infection, fairly significant looking, mild sepsis, failed oral Augmentin, possible tenosynovitis.   History of exocrine pancreatic insufficiency, status post pancreatic islet cell transplant.  Also has history of obstructive sleep apnea, mild renal insufficiency, and prior history of gram-negative blas bacteremia 3 years ago, with concern there might be some issues with her biliary duct, but has not had recurrent episodes.      Problem List:   1. Left hand dog bite cellulitis the left hand abscess status post incision and drainage postoperative on 10/30    Stable postop plan pain is controlled.  No numbness or worsening of pain.    On IV Zosyn, cultures pending, followed by infectious disease and hand surgery    Continue pain control, antibiotic can be changed to 10 days of oral Augmentin per infectious disease on discharge   She was advised to elevate affected hand and seen in Dr. Renteria's clinic 10-14 days    Teach wound dressing changes today     2. Mild acute kidney injury: Resolved      3.      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 On low dose Lantus at baseline. Will monitor sugars in hospital. If start to become elevated, then add SSI  #Hypothyroidism. synthroid  #GERD. Prilosec  #Chronic pain  #MDD    DVT Prophylaxis: Ambulate every shift  Code Status:  Full Code    Disposition Plan     Expected discharge home later today or  tomorrow pending culture results, dressing change teaching and clearance by consultants     Entered: Norman Negron 10/31/2020, 9:09 AM                 Interval History:       Patient feels improvement, she does have swelling in her hand and trying to elevated denies other symptoms.  Today's plan detailed above discussed with nursing               Physical Exam:   Physical Exam   Temp: 97.7  F (36.5  C) Temp src: Oral BP: 113/57 Pulse: 50   Resp: 16 SpO2: 95 % O2 Device: None (Room air) Oxygen Delivery: 5 LPM  There were no vitals filed for this visit.  I/O last 3 completed shifts:  In: 1040 [P.O.:940; I.V.:100]  Out: 10 [Blood:10]      GENERAL:  Comfortable.   PSYCH: pleasant, oriented, No acute distress.  EYES: PERRLA, Normal conjunctiva.  HEART:  Normal S1, S2 with no edema.  LUNGS:  Clear to auscultation, normal Respiratory effort.  ABDOMEN:  Soft, no hepatosplenomegaly, normal bowel sounds.  SKIN:  Dry to touch, No rash.  Left hand swollen and postop dressing      Medications     NO tranexamic acid         acetaminophen  975 mg Oral Q8H     amylase-lipase-protease  5 tablet Oral TID w/meals     calcium carbonate-vitamin D  1 tablet Oral Daily     docusate sodium  100 mg Oral BID     DULoxetine  20 mg Oral Daily     escitalopram  20 mg Oral Daily     gabapentin  300 mg Oral At Bedtime     insulin aspart   Subcutaneous TID w/meals     insulin glargine  6 Units Subcutaneous Daily with supper     lactobacillus rhamnosus (GG)  1 capsule Oral Daily     levothyroxine  100 mcg Oral Daily     omeprazole  40 mg Oral BID     piperacillin-tazobactam  3.375 g Intravenous Q8H     polyethylene glycol  17 g Oral Daily     senna-docusate  1 tablet Oral BID     sodium chloride (PF)  3 mL Intracatheter Q8H     traZODone  50 mg Oral At Bedtime     Data     -Data reviewed today:  I personally reviewed  all new labs and imaging results over the last 24 hours.    Recent Labs   Lab 10/31/20  0622 10/30/20  0633 10/29/20  0711    WBC 6.1 7.4 8.2   HGB 10.3* 10.5* 10.3*   HCT 34.5* 34.5* 33.4*   MCV 98 96 95    275 269     Recent Labs   Lab 10/30/20  0633 10/29/20  0711 10/28/20  2332    142 143   POTASSIUM 4.1 3.5 3.7   CHLORIDE 109 109 109   CO2 29 27 27   ANIONGAP 4 6 7   * 84 115*   BUN 12 23 28   CR 0.86 0.86 1.16*   GFRESTIMATED 75 75 52*   GFRESTBLACK 87 86 60*   VALARIE 8.1* 8.0* 8.4*       No results found for this or any previous visit (from the past 24 hour(s)).    This document was produced using voice recognition software

## 2020-10-31 NOTE — PROGRESS NOTES
ORTHOPEDIC UPPER EXTREMITY PROGRESS NOTE    POD#1  Patient is a 55 year old female who underwent Procedure(s):  Incison and drainage left dorsal hand on 10/30/2020 secondary to dog bite.   Pain is controlled. Swelling and stiffness in fingers. No nausea. No other complaints.     Vitals: /57   Pulse 50   Temp 97.7  F (36.5  C) (Oral)   Resp 16   LMP 12/19/2013   SpO2 95%       EXAM   The patient is sleepy but alert.    Sensation is intact.  Digital Flexion/Extension maintained. Moderate swelling in digits.    Brisk cap refill.   The incision is covered, dressing clean and dry. Will start hibiclens soaks.      Labs:   Recent Labs   Lab Test 10/31/20  0622 10/30/20  0633 10/29/20  0711 05/10/16  2019 05/10/16  2019 05/10/13  1950 05/10/13  1950 05/10/13  0559 05/10/13  0559   HGB 10.3* 10.5* 10.3*   < > 12.0   < > 8.1*   < > 10.6*   INR  --   --   --   --  1.03  --  1.29*  --  1.01    < > = values in this interval not displayed.   WBC 6.1  CX: pending.  ASSESSMENT  S/p I&D dorsum of left hand secondary to abscess from dog bite.   PLAN  1. Start BID Hibiclens soaks BID - discussed opening and closing hand during soaking.   2. Cont elevation, cont AROM of digits   3. Oral Augmentin x 10days at discharge  4. Daily dry dressing changes.   5. Ortho stable for discharge.   6. Follow up in clinic in 7-10 days.    Yady GUAJARDO  [facility]  [phone]

## 2020-10-31 NOTE — PLAN OF CARE
Vital signs: bradycardic ; B/P: 113/57, Temp: 97.7, HR: 50, RR: 16  Pain Control: Schedule Tylenol and Oxycodone PRN.  Diet: Tolerating Carb modified diet.  Output: Voiding adequately.  Activity: Up ambulating independently in room and parker.  Updates: Left hand swollen and pink. Elevated on pillows and hibiclens BID. Ace wrapped between cleanses. Able to wiggle fingers and CMS +.  Plan: Discharge home with PO antibiotics and close follow up with Ortho.

## 2020-11-02 ENCOUNTER — TELEPHONE (OUTPATIENT)
Dept: FAMILY MEDICINE | Facility: CLINIC | Age: 55
End: 2020-11-02

## 2020-11-02 NOTE — TELEPHONE ENCOUNTER
"  ED for acute condition Discharge Protocol    \"Hi, my name is Reny Guevara RN, a registered nurse, and I am calling from [facility].  I am calling to follow up and see how things are going for you after your recent emergency visit.\"    Tell me how you are doing now that you are home?\" doing pretty good      Discharge Instructions    \"Let's review your discharge instructions.  What is/are the follow-up recommendations?  Pt. Response: supposed to follow up with surgeon in a week    \"Has an appointment with your primary care provider been scheduled?\"  No, is calling today to make that appt with surgeon    Medications    \"Tell me what changed about your medicines when you discharged?\"    Prescribed augmentin for 10 days, to stop the flagyl    \"What questions do you have about your medications?\"   None        Call Summary    \"What questions or concerns do you have about your recent visit and your follow-up care?\"     none    \"If you have questions or things don't continue to improve, we encourage you contact us through the main clinic number (give number).  Even if the clinic is not open, triage nurses are available 24/7 to help you.     We would like you to know that our clinic has extended hours (provide information).  We also have urgent care (provide details on closest location and hours/contact info)\"    \"Thank you for your time and take care!\"                "

## 2020-11-04 LAB
BACTERIA SPEC CULT: NO GROWTH
SPECIMEN SOURCE: NORMAL

## 2020-11-06 LAB
BACTERIA SPEC CULT: NORMAL
Lab: NORMAL
SPECIMEN SOURCE: NORMAL

## 2020-11-23 DIAGNOSIS — Z90.410 POST-PANCREATECTOMY DIABETES (H): Primary | ICD-10-CM

## 2020-11-23 DIAGNOSIS — E13.9 POST-PANCREATECTOMY DIABETES (H): Primary | ICD-10-CM

## 2020-11-23 DIAGNOSIS — E89.1 POST-PANCREATECTOMY DIABETES (H): Primary | ICD-10-CM

## 2020-12-24 ENCOUNTER — TELEPHONE (OUTPATIENT)
Dept: TRANSPLANT | Facility: CLINIC | Age: 55
End: 2020-12-24

## 2020-12-24 NOTE — TELEPHONE ENCOUNTER
Patient Call: was exposed to a friend who has covid-19  Bunny is now quarantined herself 12/24/2020  No symptoms are showing right now    bunny is asking if she is doing the right thing    Call back needed? Yes    Return Call Needed  Same as documented in contacts section  When to return call?: Same day: [facility]

## 2020-12-30 DIAGNOSIS — Z90.410 POST-PANCREATECTOMY DIABETES (H): Primary | ICD-10-CM

## 2020-12-30 DIAGNOSIS — Z90.410 ACQUIRED ABSENCE OF PANCREAS: ICD-10-CM

## 2020-12-30 DIAGNOSIS — E89.1 POST-PANCREATECTOMY DIABETES (H): Primary | ICD-10-CM

## 2020-12-30 DIAGNOSIS — E13.9 POST-PANCREATECTOMY DIABETES (H): Primary | ICD-10-CM

## 2020-12-30 RX ORDER — INSULIN ASPART 100 [IU]/ML
INJECTION, SOLUTION INTRAVENOUS; SUBCUTANEOUS
Qty: 15 ML | Refills: 11 | Status: ON HOLD | OUTPATIENT
Start: 2020-12-30 | End: 2021-11-24

## 2020-12-30 RX ORDER — PEN NEEDLE, DIABETIC 32GX 5/32"
NEEDLE, DISPOSABLE MISCELLANEOUS
Qty: 100 EACH | Status: SHIPPED | OUTPATIENT
Start: 2020-12-30 | End: 2021-02-28

## 2021-01-15 ENCOUNTER — HEALTH MAINTENANCE LETTER (OUTPATIENT)
Age: 56
End: 2021-01-15

## 2021-02-04 ENCOUNTER — TELEPHONE (OUTPATIENT)
Dept: TRANSPLANT | Facility: CLINIC | Age: 56
End: 2021-02-04

## 2021-02-04 NOTE — TELEPHONE ENCOUNTER
"Lynn called with concerns about unexplained weight loss. She is down to 123 pounds. She appears to be taking adequate pancreatic enzymes, eating well, no diarrhea . When questioned about her BS it appears that she is not using her dexcom CGM because of 'SENSOR \" issues.She has not contacted [employer] for help or tried to refill her prescription. I strongly encouraged her to do both ASAP so that Dr Robles will have some data to review at her clinic visit in 2 weeks.  Re her weight loss, she does have a prior history of gastric bypass surgery. I suggested she start by talking to her PCP about her weight loss and we can discuss more if need be at her upcoming visit.  "

## 2021-02-16 ENCOUNTER — OFFICE VISIT (OUTPATIENT)
Dept: FAMILY MEDICINE | Facility: CLINIC | Age: 56
End: 2021-02-16
Payer: MEDICARE

## 2021-02-16 VITALS
BODY MASS INDEX: 21.51 KG/M2 | WEIGHT: 126 LBS | HEART RATE: 82 BPM | TEMPERATURE: 99 F | HEIGHT: 64 IN | OXYGEN SATURATION: 98 % | SYSTOLIC BLOOD PRESSURE: 136 MMHG | DIASTOLIC BLOOD PRESSURE: 78 MMHG | RESPIRATION RATE: 18 BRPM

## 2021-02-16 DIAGNOSIS — Z12.31 ENCOUNTER FOR SCREENING MAMMOGRAM FOR BREAST CANCER: ICD-10-CM

## 2021-02-16 DIAGNOSIS — E13.9 POST-PANCREATECTOMY DIABETES (H): ICD-10-CM

## 2021-02-16 DIAGNOSIS — Z12.11 SCREEN FOR COLON CANCER: ICD-10-CM

## 2021-02-16 DIAGNOSIS — E55.9 VITAMIN D DEFICIENCY, UNSPECIFIED: ICD-10-CM

## 2021-02-16 DIAGNOSIS — E89.1 POST-PANCREATECTOMY DIABETES (H): ICD-10-CM

## 2021-02-16 DIAGNOSIS — D50.8 IRON DEFICIENCY ANEMIA SECONDARY TO INADEQUATE DIETARY IRON INTAKE: ICD-10-CM

## 2021-02-16 DIAGNOSIS — Z90.410 POST-PANCREATECTOMY DIABETES (H): ICD-10-CM

## 2021-02-16 DIAGNOSIS — Z98.84 GASTRIC BYPASS STATUS FOR OBESITY: ICD-10-CM

## 2021-02-16 DIAGNOSIS — R63.4 UNINTENTIONAL WEIGHT LOSS: Primary | ICD-10-CM

## 2021-02-16 DIAGNOSIS — K86.81 EXOCRINE PANCREATIC INSUFFICIENCY: ICD-10-CM

## 2021-02-16 LAB
ERYTHROCYTE [DISTWIDTH] IN BLOOD BY AUTOMATED COUNT: 15.4 % (ref 10–15)
HBA1C MFR BLD: 6.9 % (ref 0–5.6)
HCT VFR BLD AUTO: 35.6 % (ref 35–47)
HGB BLD-MCNC: 11.1 G/DL (ref 11.7–15.7)
MCH RBC QN AUTO: 28.5 PG (ref 26.5–33)
MCHC RBC AUTO-ENTMCNC: 31.2 G/DL (ref 31.5–36.5)
MCV RBC AUTO: 91 FL (ref 78–100)
PLATELET # BLD AUTO: 386 10E9/L (ref 150–450)
RBC # BLD AUTO: 3.9 10E12/L (ref 3.8–5.2)
VIT B12 SERPL-MCNC: 278 PG/ML (ref 193–986)
WBC # BLD AUTO: 5.8 10E9/L (ref 4–11)

## 2021-02-16 PROCEDURE — 82306 VITAMIN D 25 HYDROXY: CPT | Performed by: FAMILY MEDICINE

## 2021-02-16 PROCEDURE — 83540 ASSAY OF IRON: CPT | Performed by: FAMILY MEDICINE

## 2021-02-16 PROCEDURE — 99214 OFFICE O/P EST MOD 30 MIN: CPT | Performed by: FAMILY MEDICINE

## 2021-02-16 PROCEDURE — 85027 COMPLETE CBC AUTOMATED: CPT | Performed by: FAMILY MEDICINE

## 2021-02-16 PROCEDURE — 83550 IRON BINDING TEST: CPT | Performed by: FAMILY MEDICINE

## 2021-02-16 PROCEDURE — 80053 COMPREHEN METABOLIC PANEL: CPT | Performed by: FAMILY MEDICINE

## 2021-02-16 PROCEDURE — 36415 COLL VENOUS BLD VENIPUNCTURE: CPT | Performed by: FAMILY MEDICINE

## 2021-02-16 PROCEDURE — 83036 HEMOGLOBIN GLYCOSYLATED A1C: CPT | Performed by: FAMILY MEDICINE

## 2021-02-16 PROCEDURE — 82607 VITAMIN B-12: CPT | Performed by: FAMILY MEDICINE

## 2021-02-16 ASSESSMENT — MIFFLIN-ST. JEOR: SCORE: 1136.53

## 2021-02-16 NOTE — PROGRESS NOTES
Assessment & Plan     Unintentional weight loss - will run lab work today. Discussed somewhat complex picture with gastric bypass, EPI. She is also due for cancer screening as well, ordered.   - Vitamin B12  - Vitamin D Deficiency  - Iron and iron binding capacity  - Hemoglobin A1c  - CBC with platelets  - Comprehensive metabolic panel (BMP + Alb, Alk Phos, ALT, AST, Total. Bili, TP)    Gastric bypass status for obesity - as above  - Vitamin B12  - Vitamin D Deficiency    Iron deficiency anemia secondary to inadequate dietary iron intake - as above  - Iron and iron binding capacity  - CBC with platelets    Exocrine pancreatic insufficiency  - Hemoglobin A1c    Post-pancreatectomy diabetes (H)  - Hemoglobin A1c    Screen for colon cancer  - GASTROENTEROLOGY ADULT REF PROCEDURE ONLY; Future    Encounter for screening mammogram for breast cancer  - MA Screen Bilateral w/Elieser; Future    Vitamin D deficiency, unspecified   - Vitamin D Deficiency    Return in about 6 months (around 8/16/2021) for Yearly Preventive Exam.    Maryana Brooks MD  [facility] Bailey is a 56 year old who presents for the following health issues     History of Present Illness       She eats 2-3 servings of fruits and vegetables daily.She consumes 0 sweetened beverage(s) daily.She exercises with enough effort to increase her heart rate 10 to 19 minutes per day.    She is taking medications regularly.     Pt. Also has concerns of maybe internal hemorrhoids or prolapse    Concern - weight loss   Onset: 8 months ago  Description: 20-25 lbs   Progression of Symptoms:  worsening  Accompanying Signs & Symptoms: blood sugars all over the place   Previous history of similar problem: no  Precipitating factors:        Worsened by: no  Alleviating factors:        Improved by: no  Therapies tried and outcome:  none     Reports that her Endo says her pancreatic enzymes all are good.     Normal stooling.  "  No vaginal bleeding.   No breast changes.     Doesn't sleep well, this is not new for her.   No change in eating.   Some increase in activity with fostering dogs.     History of gastric bypass.   Post pancreatectomy, has EPI, T1DM.     Reports that, when she passes stool, her rectum can protrude. She physically needs to push this back in. Some pain with this. No vaginal prolapse.       Review of Systems   Constitutional, HEENT, cardiovascular, pulmonary, gi and gu systems are negative, except as otherwise noted.      Objective    /78 (BP Location: Right arm, Patient Position: Chair, Cuff Size: Adult Small)   Pulse 82   Temp 99  F (37.2  C) (Oral)   Resp 18   Ht 1.626 m (5' 4\")   Wt 57.2 kg (126 lb)   LMP 12/19/2013   SpO2 98%   BMI 21.63 kg/m    Body mass index is 21.63 kg/m .  Physical Exam   GENERAL: healthy, alert and no distress   (female): normal female external genitalia, normal urethral meatus, vaginal mucosa, normal cervix/adnexa/uterus without masses or discharge  RECTAL: rectocele palpated on rectal exam          "

## 2021-02-17 LAB
ALBUMIN SERPL-MCNC: 3.5 G/DL (ref 3.4–5)
ALP SERPL-CCNC: 118 U/L (ref 40–150)
ALT SERPL W P-5'-P-CCNC: 27 U/L (ref 0–50)
ANION GAP SERPL CALCULATED.3IONS-SCNC: 6 MMOL/L (ref 3–14)
AST SERPL W P-5'-P-CCNC: 17 U/L (ref 0–45)
BILIRUB SERPL-MCNC: 0.2 MG/DL (ref 0.2–1.3)
BUN SERPL-MCNC: 21 MG/DL (ref 7–30)
CALCIUM SERPL-MCNC: 9.6 MG/DL (ref 8.5–10.1)
CHLORIDE SERPL-SCNC: 108 MMOL/L (ref 94–109)
CO2 SERPL-SCNC: 28 MMOL/L (ref 20–32)
CREAT SERPL-MCNC: 0.84 MG/DL (ref 0.52–1.04)
DEPRECATED CALCIDIOL+CALCIFEROL SERPL-MC: 34 UG/L (ref 20–75)
GFR SERPL CREATININE-BSD FRML MDRD: 77 ML/MIN/{1.73_M2}
GLUCOSE SERPL-MCNC: 106 MG/DL (ref 70–99)
IRON SATN MFR SERPL: 10 % (ref 15–46)
IRON SERPL-MCNC: 38 UG/DL (ref 35–180)
POTASSIUM SERPL-SCNC: 3.7 MMOL/L (ref 3.4–5.3)
PROT SERPL-MCNC: 7 G/DL (ref 6.8–8.8)
SODIUM SERPL-SCNC: 142 MMOL/L (ref 133–144)
TIBC SERPL-MCNC: 365 UG/DL (ref 240–430)

## 2021-02-18 ENCOUNTER — VIRTUAL VISIT (OUTPATIENT)
Dept: TRANSPLANT | Facility: CLINIC | Age: 56
End: 2021-02-18
Attending: PEDIATRICS
Payer: MEDICARE

## 2021-02-18 DIAGNOSIS — E89.1 POST-PANCREATECTOMY DIABETES (H): Primary | ICD-10-CM

## 2021-02-18 DIAGNOSIS — Z90.410 POST-PANCREATECTOMY DIABETES (H): Primary | ICD-10-CM

## 2021-02-18 DIAGNOSIS — E13.9 POST-PANCREATECTOMY DIABETES (H): Primary | ICD-10-CM

## 2021-02-18 PROCEDURE — 99214 OFFICE O/P EST MOD 30 MIN: CPT | Mod: 95 | Performed by: PEDIATRICS

## 2021-02-18 NOTE — PATIENT INSTRUCTIONS
1)  We did not make any changes today but I would like you to pay attention to the nighttime low glucoses-- if that seems to be happening often that you are dropping low at night, then I would reduce your lantus from 6 units to 5 units.    2)  I can't see your Dexcom data so if we continue to have this problem, I may have you meet with Kym Fonseca (CDE) just to help get the data linked in.    I will see you at 3:40pm on Thursday 5/20

## 2021-02-18 NOTE — LETTER
2/18/2021         RE: Lynn Thompson  [address] [facility] [address]        Dear Colleague,    Thank you for referring your patient, Lynn Thompson, to the [facility]. Please see a copy of my visit note below.    Lynn is a 56 year old who is being evaluated via a billable video visit.      Video Start Time: 4:20    Video-Visit Details    Type of service:  Video Visit    Video End Time:4:35    Originating Location (pt. Location): Home- in car    Distant Location (provider location):  [facility]     Platform used for Video Visit: [facility]  Islet Autotransplant, Diabetes Follow Up    Problem List:  Patient Active Problem List   Diagnosis     Iron deficiency anemia secondary to inadequate dietary iron intake     Gastric bypass status for obesity     Health Care Home     Chronic pain syndrome     Exocrine pancreatic insufficiency     Asplenia     BLU (obstructive sleep apnea)     S/P exploratory laparotomy     Dysthymia     Anxiety     Thyroid nodule     Acquired hypothyroidism     Post-pancreatectomy diabetes (H)     E coli bacteremia     Antibiotic-associated diarrhea     Elevated LFTs     Fever     Dog bite, initial encounter     Cellulitis, unspecified cellulitis site       HPI:  Lynn is a 56 year old female here for follow up o total pancreatectomy, islet cell autotransplant, splenectomy, liver biopsy (needle core), choledochoduodenostomy, feeding jejunostomy performed on 5/10/2013.  At the time of the procedure, the patient received 178,100 IEQ, or 3,209 IEQ/kg body weight. She has had two subsequent exploratory laparatomy for small bowel obstruction. Other notable endocrine history includes a complex cystic nodule in mid right thyroid lobe with 1 cm maximum diameter (saw Dr Adorno in 11/2016) which needs annual follow up U/S in Nov 2017, and mild hypothyroidism followed by PCP.  She had  an episode of cholangitis and was hospitalized for 4 days 8/24/17 (fevers, RUQ pain, + Ecoli blood cx).    She was on NO insulin at her 1 year, 2 year, 3 year, 4 year transplant anniversaries and restart insulin just after 4 years post-tx, insulin restart date of  6/6/2017.  She is continuing to wean narcotics, on a suboxone wean, now on a 1/8 patch (Suboxone 1- 0.5 mg film) daily, with no other narcotics.     At today's visit, overall Lynn is doing well overall.      1)  Diabetes:  Lynn continues to have partial islet graft function.  A1c has remained <7% recently. She has a Dexcom since our last visit although she had a lapse of time without it in January, but restarted it about 10 days ago. Unfortunately I can't seem to access data through the [employer] system so I do not have the most up to date data to review.  Lynn tells me that she has mostly liked the Dexcom, but did have times where it has not been accurate, including an episode of symptomatic hypoglycemia at night where the dexcom read 185 and she checked (due to symptoms) and was 43 mg/dL.  She has had a few lows that her daughter has alerted her to through Dexcom share, mostly at night.  She taking her call from her car today (not driving) as her and her colleague are on their way to  dogs for the dog rescue.    Lantus 6 units per day  Novolog 0.5 units per 10g, and correction scale of 0.5 u per 100 >150 (3 injections per day with meals)-- total 6 units per day  TDD=12 units/day      Recent A1c:   Lab Results   Component Value Date    A1C 6.9 02/16/2021    A1C 6.7 08/20/2020    A1C 7.1 06/05/2020    A1C 6.9 02/06/2020    A1C 7.6 09/05/2019         Hypoglycemia history:   Recent history as above.  The patient has had NO episodes of severe hypoglycemia (seizure, coma, or neuroglycopenic symptoms severe enough to require assistance from another person).  Blood sugars were reviewed from the patient records and/or the meter download.     Average B mg/dL range  mg/dL  Number of blood sugar checks per day 2.8 in this meter-- needs strips for 4 checks per day.  Prescribed to test 4 per day or more.    Patient is good candidate for CGM therapy.  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   --  Require frequent adjustments of the insulin treatment regimen, based on therapeutic CGM test results      2)  New issues:  22 pound weight loss.  No symptoms with this. Denies pain, steatorrhea. Still of pain meds.  Takes viokace with meals (due to gastric bypass is not on enteric coated formulation)    3)  Psychosocial:  She has a new grandson Jose who is 3 weeks old.      Review of systems:  Review Of Systems  Skin: negative  Eyes: negative  Ears/Nose/Throat: negative  Respiratory: No shortness of breath, dyspnea on exertion, cough, or hemoptysis  Cardiovascular: negative  Gastrointestinal: as above- no recent issues  Genitourinary: negative  Musculoskeletal: negative  Neurologic: negative  Psychiatric: anxiety/depression  Hematologic/Lymphatic/Immunologic: negative  Endocrine: as above    Past Medical and Surgical History:  Past Medical History:   Diagnosis Date     Abdominal pain, epigastric , ongoing; goes to pain clinic    probable recurrent spasm sphincter of Oddi causing biliary colic pains      Benign paroxysmal positional vertigo     occ.      Calculus of kidney 5/05    x1 on L side passed, several stones.  Has been tested for oxalate.     Chronic abdominal pain 2013     Chronic pancreatitis (H) 2013     Depressive disorder, not  elsewhere classified     also occ panic spells     Diabetes (H)     post pancreatectomy     Dyspepsia and other specified disorders of function of stomach 6/99    H. 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    ERCP with Stent of CBD by Fernandez @ [facility] [place] with sx relief     Spasm of sphincter of Oddi     surgical + endoscopic stenting of pancreatic duct @ [facility] [place] 5/23/06     Thyroid nodule 11/1/2016     Ureteral calculus 10/2/2012     Vaccination not carried out      Past Surgical History:   Procedure Laterality Date     APPENDECTOMY  1990     CHOLECYSTECTOMY       COLONOSCOPY       COMBINED CYSTOSCOPY, RETROGRADES, URETEROSCOPY, LASER HOLMIUM LITHOTRIPSY URETER(S), INSERT STENT Right 3/23/2015    Procedure: COMBINED CYSTOSCOPY, RETROGRADES, URETEROSCOPY, LASER HOLMIUM LITHOTRIPSY URETER(S), INSERT STENT;  Surgeon: Kennedi Aldana MD;  Location: [facility]     COMBINED CYSTOSCOPY, RETROGRADES, URETEROSCOPY, LASER HOLMIUM LITHOTRIPSY URETER(S), INSERT STENT Right 4/20/2015    Procedure: COMBINED CYSTOSCOPY, RETROGRADES, URETEROSCOPY, LASER HOLMIUM LITHOTRIPSY URETER(S), INSERT STENT;  Surgeon: Kennedi Aldana MD;  Location: [facility] OR     COSMETIC SURGERY  6/24/2002    Tummy tuck     CYSTOSCOPY, RETROGRADES, INSERT STENT URETER(S), COMBINED  10/2/2012    Procedure: COMBINED CYSTOSCOPY, RETROGRADES, INSERT STENT URETER(S);  COMBINED CYSTOSCOPY,  , INSERT LEFT STENT URETER;  Surgeon: Johny Baez MD;  Location: [facility] OR     ENT SURGERY       ESOPHAGOSCOPY, GASTROSCOPY,  DUODENOSCOPY (EGD), COMBINED N/A 2018    Procedure: COMBINED ESOPHAGOSCOPY, GASTROSCOPY, DUODENOSCOPY (EGD);  ESOPHAGOSCOPY, GASTROSCOPY, DUODENOSCOPY (EGD)    ;  Surgeon: Tamir Rodgers MD;  Location: [facility] GI     EXTRACORPOREAL SHOCK WAVE LITHOTRIPSY (ESWL)  10/16/2012    Procedure: EXTRACORPOREAL SHOCK WAVE LITHOTRIPSY (ESWL);  left EXTRACORPOREAL SHOCK WAVE LITHOTRIPSY (ESWL) ;  Surgeon: Johny Baez MD;  Location: [facility] OR     GI SURGERY  2015    Small bowel obstruction     GI SURGERY      gastric bypass     HC LAP,LYSIS OF ADHESIONS       HERNIA REPAIR  2015     IRRIGATION AND DEBRIDEMENT HAND, COMBINED Left 10/30/2020    Procedure: Left hand sharp excisional debridement of skin, subcutaneous tissue and fat with a scalpel, 2 x 1 x 1 cm.;  Surgeon: Demian Renteria MD;  Location: [facility] OR     LAPAROSCOPIC LYSIS ADHESIONS N/A 2015    Procedure: LAPAROSCOPIC LYSIS ADHESIONS;  Surgeon: Aaron Early MD;  [facility]     LAPAROSCOPIC LYSIS ADHESIONS N/A 2015    Procedure: LAPAROSCOPIC LYSIS ADHESIONS;  Surgeon: Aaron Early MD;  [facility]     PANCREATECTOMY, TRANSPLANT AUTO ISLET CELL, COMBINED  5/10/2013    Procedure: COMBINED PANCREATECTOMY, TRANSPLANT AUTO ISLET CELL;  Pancreatectomy, Auto Islet Cell Transplant   hernia repair, jejunostomy tube and liver biopsies with Anesthesia General with block;  Surgeon: Aaron Early MD;  [facility]     REPAIR PTOSIS BROW BILATERAL Bilateral 2020    Procedure: BILATERAL BROW PTOSIS REPAIR;  Surgeon: Denise Alberts MD;  Location: [facility] OR     TRANSPLANT  5/10/13     ZZC NONSPECIFIC PROCEDURE      R bunion     ZZC NONSPECIFIC PROCEDURE      T & A     ZZC NONSPECIFIC PROCEDURE      partial ileum resection     ZZC NONSPECIFIC PROCEDURE      R ovarian cyst     ZZC NONSPECIFIC PROCEDURE           ZZC NONSPECIFIC PROCEDURE      GALL BLADDER     ZZC NONSPECIFIC PROCEDURE      CBD  stent; Dr. Presley     [facility] NONSPECIFIC PROCEDURE   &     colonoscopy     [facility] NONSPECIFIC PROCEDURE      Gastric bypass       Family History:  New changes since last visit:  none  Family History   Problem Relation Age of Onset     Family History Negative Mother      Respiratory Father         COPD;  at 69     Genitourinary Problems Father         kidney stones     Substance Abuse Father      Depression Father      Asthma Father      Heart Disease Paternal Grandfather         M.I.     Coronary Artery Disease Paternal Grandfather      Hyperlipidemia Paternal Grandfather      Genitourinary Problems Brother         multiple brothers with kidney stones     Gastrointestinal Disease Maternal Grandmother         undiagnosed 'gut' issues     Coronary Artery Disease Maternal Grandfather      Hyperlipidemia Maternal Grandfather      Cerebrovascular Disease Paternal Grandmother         At the age of 103     Anxiety Disorder Paternal Grandmother      Osteoporosis Paternal Grandmother      Anxiety Disorder Son      Anxiety Disorder Daughter      Asthma Daughter        Social History:  Social History     Social History Narrative     Not on file     Does not work currently.  Mom to many foster dogs     Physical Exam:  Vitals: LMP 2013   BMI= There is no height or weight on file to calculate BMI.  General:  Appearance is normal, no acute distress  HEENT:  NC/AT, sclera appear white  Neck:  No obvious thyromegaly  CV/Lungs:  Non distressed breathing  Skin:  No apparent rashes.   Neuro:  Normal mental status  Psych:  Normal affect        Assessment:  1.  Post pancreatectomy diabetes mellitus, s/p total pancreatectomy and islet autotransplant.    Lynn is a 56 year old with history of chronic pancreatitis who is s/p total pancreatectomy and islet autotransplant.  She was insulin independent until 2017 (just after 4 years post-tx) and now has partial islet function.    Today her A1c is in a reasonable range  for her.  My only concern is the reported nocturnal hypoglycemia, though without being able to see the dexcom I am unsure on frequency.  We discussed plan to reduce glargine only if this is a recurrent issue that she is noting.       Plan:  1.  Changes to current diabetes regimen:  Patient Instructions   1)  We did not make any changes today but I would like you to pay attention to the nighttime low glucoses-- if that seems to be happening often that you are dropping low at night, then I would reduce your lantus from 6 units to 5 units.    2)  I can't see your Dexcom data so if we continue to have this problem, I may have you meet with Kym Fonseca (CDE) just to help get the data linked in.    I will see you at 3:40pm on Thursday 5/20      2.  Frequency of blood sugar checks:  premeal and bedtime, and as needed for hypoglycemia (6 strip per day).    3.  Continue routine follow up for autoislet transplant patients:  Mixed meal test (6 mL/kg BoostHP to max of 360 mL) at 3 months, 6 months, and once a year post transplant.  Hemoglobin A1c levels at these time points and quarterly.    4.  Other issues addressed today:  As above    Follow up:  As above        Contact me for questions at [phone] or [phone].  Emergency number to reach pediatric endocrinology after hours is [phone].        Dr. Adriana Robles MD  [facility]  Director of Research, Islet Auto-transplant Program  Phone:  [phone]  Electronically signed: February 18, 2021 at 4:48 PM      35 minutes spent on date of encounter doing chart review, history exam, documentation and further activities as noted above.         Again, thank you for allowing me to participate in the care of your patient.        Sincerely,        Adriana Robles MD

## 2021-02-18 NOTE — PROGRESS NOTES
Lynn is a 56 year old who is being evaluated via a billable video visit.      Video Start Time: 4:20    Video-Visit Details    Type of service:  Video Visit    Video End Time:4:35    Originating Location (pt. Location): Home- in car    Distant Location (provider location):  [facility]     Platform used for Video Visit: [facility]  Islet Autotransplant, Diabetes Follow Up    Problem List:  Patient Active Problem List   Diagnosis     Iron deficiency anemia secondary to inadequate dietary iron intake     Gastric bypass status for obesity     Health Care Home     Chronic pain syndrome     Exocrine pancreatic insufficiency     Asplenia     BLU (obstructive sleep apnea)     S/P exploratory laparotomy     Dysthymia     Anxiety     Thyroid nodule     Acquired hypothyroidism     Post-pancreatectomy diabetes (H)     E coli bacteremia     Antibiotic-associated diarrhea     Elevated LFTs     Fever     Dog bite, initial encounter     Cellulitis, unspecified cellulitis site       HPI:  Lynn is a 56 year old female here for follow up o total pancreatectomy, islet cell autotransplant, splenectomy, liver biopsy (needle core), choledochoduodenostomy, feeding jejunostomy performed on 5/10/2013.  At the time of the procedure, the patient received 178,100 IEQ, or 3,209 IEQ/kg body weight. She has had two subsequent exploratory laparatomy for small bowel obstruction. Other notable endocrine history includes a complex cystic nodule in mid right thyroid lobe with 1 cm maximum diameter (saw Dr Adorno in 11/2016) which needs annual follow up U/S in Nov 2017, and mild hypothyroidism followed by PCP.  She had an episode of cholangitis and was hospitalized for 4 days 8/24/17 (fevers, RUQ pain, + Ecoli blood cx).    She was on NO insulin at her 1 year, 2 year, 3 year, 4 year transplant anniversaries and restart insulin just after 4 years post-tx, insulin restart date  of  2017.  She is continuing to wean narcotics, on a suboxone wean, now on a 1/8 patch (Suboxone 1- 0.5 mg film) daily, with no other narcotics.     At today's visit, overall Lynn is doing well overall.      1)  Diabetes:  Lynn continues to have partial islet graft function.  A1c has remained <7% recently. She has a Dexcom since our last visit although she had a lapse of time without it in January, but restarted it about 10 days ago. Unfortunately I can't seem to access data through the [employer] system so I do not have the most up to date data to review.  Lynn tells me that she has mostly liked the Dexcom, but did have times where it has not been accurate, including an episode of symptomatic hypoglycemia at night where the dexcom read 185 and she checked (due to symptoms) and was 43 mg/dL.  She has had a few lows that her daughter has alerted her to through Dexcom share, mostly at night.  She taking her call from her car today (not driving) as her and her colleague are on their way to  dogs for the dog rescue.    Lantus 6 units per day  Novolog 0.5 units per 10g, and correction scale of 0.5 u per 100 >150 (3 injections per day with meals)-- total 6 units per day  TDD=12 units/day      Recent A1c:   Lab Results   Component Value Date    A1C 6.9 2021    A1C 6.7 2020    A1C 7.1 2020    A1C 6.9 2020    A1C 7.6 2019         Hypoglycemia history:   Recent history as above.  The patient has had NO episodes of severe hypoglycemia (seizure, coma, or neuroglycopenic symptoms severe enough to require assistance from another person).  Blood sugars were reviewed from the patient records and/or the meter download.    Average B mg/dL range  mg/dL  Number of blood sugar checks per day 2.8 in this meter-- needs strips for 4 checks per day.  Prescribed to test 4 per day or more.    Patient is good candidate for CGM therapy.  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   --  Require frequent adjustments of the insulin treatment regimen, based on therapeutic CGM test results      2)  New issues:  22 pound weight loss.  No symptoms with this. Denies pain, steatorrhea. Still of pain meds.  Takes viokace with meals (due to gastric bypass is not on enteric coated formulation)    3)  Psychosocial:  She has a new grandson Jose who is 3 weeks old.      Review of systems:  Review Of Systems  Skin: negative  Eyes: negative  Ears/Nose/Throat: negative  Respiratory: No shortness of breath, dyspnea on exertion, cough, or hemoptysis  Cardiovascular: negative  Gastrointestinal: as above- no recent issues  Genitourinary: negative  Musculoskeletal: negative  Neurologic: negative  Psychiatric: anxiety/depression  Hematologic/Lymphatic/Immunologic: negative  Endocrine: as above    Past Medical and Surgical History:  Past Medical History:   Diagnosis Date     Abdominal pain, epigastric 11/05, ongoing; goes to pain clinic    probable recurrent spasm sphincter of Oddi causing biliary colic pains      Benign paroxysmal positional vertigo     occ.      Calculus of kidney 5/05    x1 on L side passed, several stones.  Has been tested for oxalate.     Chronic abdominal pain 7/17/2013     Chronic pancreatitis (H) 7/17/2013     Depressive disorder, not elsewhere classified     also occ panic spells     Diabetes (H)     post pancreatectomy     Dyspepsia and other specified disorders of function of stomach 6/99    H. 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    ERCP with Stent of CBD by Fernandez @ [facility] with sx relief     Spasm of sphincter of Oddi     surgical + endoscopic stenting of pancreatic duct @ [facility] 5/23/06     Thyroid nodule 11/1/2016     Ureteral calculus 10/2/2012     Vaccination not carried out      Past Surgical History:   Procedure Laterality Date     APPENDECTOMY  1990     CHOLECYSTECTOMY       COLONOSCOPY       COMBINED CYSTOSCOPY, RETROGRADES, URETEROSCOPY, LASER HOLMIUM LITHOTRIPSY URETER(S), INSERT STENT Right 3/23/2015    Procedure: COMBINED CYSTOSCOPY, RETROGRADES, URETEROSCOPY, LASER HOLMIUM LITHOTRIPSY URETER(S), INSERT STENT;  Surgeon: Kennedi Aldana MD;  Location: [facility] OR     COMBINED CYSTOSCOPY, RETROGRADES, URETEROSCOPY, LASER HOLMIUM LITHOTRIPSY URETER(S), INSERT STENT Right 4/20/2015    Procedure: COMBINED CYSTOSCOPY, RETROGRADES, URETEROSCOPY, LASER HOLMIUM LITHOTRIPSY URETER(S), INSERT STENT;  Surgeon: Kennedi Aldana MD;  Location: [facility] OR     COSMETIC SURGERY  6/24/2002    Tummy tuck     CYSTOSCOPY, RETROGRADES, INSERT STENT URETER(S), COMBINED  10/2/2012    Procedure: COMBINED CYSTOSCOPY, RETROGRADES, INSERT STENT URETER(S);  COMBINED CYSTOSCOPY,  , INSERT LEFT STENT URETER;  Surgeon: Johny Baez MD;  Location:  OR     ENT SURGERY       ESOPHAGOSCOPY, GASTROSCOPY, DUODENOSCOPY (EGD), COMBINED N/A 1/24/2018    Procedure: COMBINED ESOPHAGOSCOPY, GASTROSCOPY, DUODENOSCOPY (EGD);  ESOPHAGOSCOPY, GASTROSCOPY, DUODENOSCOPY (EGD)    ;  Surgeon: Tamir Rdogers MD;  Location:  GI     EXTRACORPOREAL SHOCK WAVE LITHOTRIPSY (ESWL)  10/16/2012     Procedure: EXTRACORPOREAL SHOCK WAVE LITHOTRIPSY (ESWL);  left EXTRACORPOREAL SHOCK WAVE LITHOTRIPSY (ESWL) ;  Surgeon: Johny Baez MD;  Location: [facility] OR     GI SURGERY  2015    Small bowel obstruction     GI SURGERY      gastric bypass     HC LAP,LYSIS OF ADHESIONS       HERNIA REPAIR  2015     IRRIGATION AND DEBRIDEMENT HAND, COMBINED Left 10/30/2020    Procedure: Left hand sharp excisional debridement of skin, subcutaneous tissue and fat with a scalpel, 2 x 1 x 1 cm.;  Surgeon: Demian Renteria MD;  Location: [facility] OR     LAPAROSCOPIC LYSIS ADHESIONS N/A 2015    Procedure: LAPAROSCOPIC LYSIS ADHESIONS;  Surgeon: Aaron Early MD;  [facility]     LAPAROSCOPIC LYSIS ADHESIONS N/A 2015    Procedure: LAPAROSCOPIC LYSIS ADHESIONS;  Surgeon: Aaron Early MD;  [facility]     PANCREATECTOMY, TRANSPLANT AUTO ISLET CELL, COMBINED  5/10/2013    Procedure: COMBINED PANCREATECTOMY, TRANSPLANT AUTO ISLET CELL;  Pancreatectomy, Auto Islet Cell Transplant   hernia repair, jejunostomy tube and liver biopsies with Anesthesia General with block;  Surgeon: Aaron Early MD;  [facility] OR     REPAIR PTOSIS BROW BILATERAL Bilateral 2020    Procedure: BILATERAL BROW PTOSIS REPAIR;  Surgeon: Denise Alberts MD;  Location: [facility] OR     TRANSPLANT  5/10/13     ZZC NONSPECIFIC PROCEDURE      R bunion     ZZC NONSPECIFIC PROCEDURE      T & A     ZZC NONSPECIFIC PROCEDURE      partial ileum resection     ZZC NONSPECIFIC PROCEDURE      R ovarian cyst     ZZC NONSPECIFIC PROCEDURE           ZZC NONSPECIFIC PROCEDURE      GALL BLADDER     ZZC NONSPECIFIC PROCEDURE      CBD stent; Dr. Presley     Z NONSPECIFIC PROCEDURE   &     colonoscopy     ZZC NONSPECIFIC PROCEDURE      Gastric bypass       Family History:  New changes since last visit:  none  Family History   Problem Relation Age of Onset     Family History Negative Mother       Respiratory Father         COPD;  at 69     Genitourinary Problems Father         kidney stones     Substance Abuse Father      Depression Father      Asthma Father      Heart Disease Paternal Grandfather         M.I.     Coronary Artery Disease Paternal Grandfather      Hyperlipidemia Paternal Grandfather      Genitourinary Problems Brother         multiple brothers with kidney stones     Gastrointestinal Disease Maternal Grandmother         undiagnosed 'gut' issues     Coronary Artery Disease Maternal Grandfather      Hyperlipidemia Maternal Grandfather      Cerebrovascular Disease Paternal Grandmother         At the age of 103     Anxiety Disorder Paternal Grandmother      Osteoporosis Paternal Grandmother      Anxiety Disorder Son      Anxiety Disorder Daughter      Asthma Daughter        Social History:  Social History     Social History Narrative     Not on file     Does not work currently.  Mom to many foster dogs     Physical Exam:  Vitals: LMP 2013   BMI= There is no height or weight on file to calculate BMI.  General:  Appearance is normal, no acute distress  HEENT:  NC/AT, sclera appear white  Neck:  No obvious thyromegaly  CV/Lungs:  Non distressed breathing  Skin:  No apparent rashes.   Neuro:  Normal mental status  Psych:  Normal affect        Assessment:  1.  Post pancreatectomy diabetes mellitus, s/p total pancreatectomy and islet autotransplant.    Lynn is a 56 year old with history of chronic pancreatitis who is s/p total pancreatectomy and islet autotransplant.  She was insulin independent until 2017 (just after 4 years post-tx) and now has partial islet function.    Today her A1c is in a reasonable range for her.  My only concern is the reported nocturnal hypoglycemia, though without being able to see the dexcom I am unsure on frequency.  We discussed plan to reduce glargine only if this is a recurrent issue that she is noting.       Plan:  1.  Changes to current diabetes  regimen:  Patient Instructions   1)  We did not make any changes today but I would like you to pay attention to the nighttime low glucoses-- if that seems to be happening often that you are dropping low at night, then I would reduce your lantus from 6 units to 5 units.    2)  I can't see your Dexcom data so if we continue to have this problem, I may have you meet with Kym Fonseca (CDE) just to help get the data linked in.    I will see you at 3:40pm on Thursday 5/20      2.  Frequency of blood sugar checks:  premeal and bedtime, and as needed for hypoglycemia (6 strip per day).    3.  Continue routine follow up for autoislet transplant patients:  Mixed meal test (6 mL/kg BoostHP to max of 360 mL) at 3 months, 6 months, and once a year post transplant.  Hemoglobin A1c levels at these time points and quarterly.    4.  Other issues addressed today:  As above    Follow up:  As above        Contact me for questions at [phone] or [phone].  Emergency number to reach pediatric endocrinology after hours is [phone].        Dr. Adriana Robles MD  [facility]  Director of Research, Islet Auto-transplant Program  Phone:  [phone]  Electronically signed: February 18, 2021 at 4:48 PM      35 minutes spent on date of encounter doing chart review, history exam, documentation and further activities as noted above.

## 2021-02-22 ENCOUNTER — ANCILLARY PROCEDURE (OUTPATIENT)
Dept: MAMMOGRAPHY | Facility: CLINIC | Age: 56
End: 2021-02-22
Payer: MEDICARE

## 2021-02-22 DIAGNOSIS — Z12.31 ENCOUNTER FOR SCREENING MAMMOGRAM FOR BREAST CANCER: ICD-10-CM

## 2021-02-22 PROCEDURE — 77067 SCR MAMMO BI INCL CAD: CPT | Mod: TC | Performed by: RADIOLOGY

## 2021-02-22 PROCEDURE — 77063 BREAST TOMOSYNTHESIS BI: CPT | Mod: TC | Performed by: RADIOLOGY

## 2021-02-27 PROCEDURE — 96360 HYDRATION IV INFUSION INIT: CPT

## 2021-02-27 PROCEDURE — 99285 EMERGENCY DEPT VISIT HI MDM: CPT | Mod: 25

## 2021-02-27 PROCEDURE — C9803 HOPD COVID-19 SPEC COLLECT: HCPCS

## 2021-02-27 PROCEDURE — 62270 DX LMBR SPI PNXR: CPT

## 2021-02-28 ENCOUNTER — HOSPITAL ENCOUNTER (EMERGENCY)
Facility: CLINIC | Age: 56
Discharge: HOME | End: 2021-02-28
Attending: EMERGENCY MEDICINE | Admitting: EMERGENCY MEDICINE
Payer: MEDICARE

## 2021-02-28 ENCOUNTER — APPOINTMENT (OUTPATIENT)
Dept: GENERAL RADIOLOGY | Facility: CLINIC | Age: 56
End: 2021-02-28
Attending: EMERGENCY MEDICINE
Payer: MEDICARE

## 2021-02-28 VITALS
RESPIRATION RATE: 20 BRPM | SYSTOLIC BLOOD PRESSURE: 96 MMHG | HEART RATE: 61 BPM | DIASTOLIC BLOOD PRESSURE: 54 MMHG | OXYGEN SATURATION: 98 % | TEMPERATURE: 98.8 F

## 2021-02-28 DIAGNOSIS — R51.9 ACUTE NONINTRACTABLE HEADACHE, UNSPECIFIED HEADACHE TYPE: ICD-10-CM

## 2021-02-28 DIAGNOSIS — D72.829 LEUKOCYTOSIS, UNSPECIFIED TYPE: ICD-10-CM

## 2021-02-28 DIAGNOSIS — R50.9 FEVER, UNSPECIFIED FEVER CAUSE: ICD-10-CM

## 2021-02-28 LAB
ALBUMIN SERPL-MCNC: 3.2 G/DL (ref 3.4–5)
ALBUMIN UR-MCNC: ABNORMAL MG/DL
ALP SERPL-CCNC: 112 U/L (ref 40–150)
ALT SERPL W P-5'-P-CCNC: 24 U/L (ref 0–50)
ANION GAP SERPL CALCULATED.3IONS-SCNC: 4 MMOL/L (ref 3–14)
APPEARANCE CSF: CLEAR
APPEARANCE UR: CLEAR
AST SERPL W P-5'-P-CCNC: 18 U/L (ref 0–45)
BASOPHILS # BLD AUTO: 0 10E9/L (ref 0–0.2)
BASOPHILS NFR BLD AUTO: 0.3 %
BILIRUB DIRECT SERPL-MCNC: <0.1 MG/DL (ref 0–0.2)
BILIRUB SERPL-MCNC: 0.2 MG/DL (ref 0.2–1.3)
BILIRUB UR QL STRIP: NEGATIVE
BUN SERPL-MCNC: 27 MG/DL (ref 7–30)
CALCIUM SERPL-MCNC: 9 MG/DL (ref 8.5–10.1)
CHLORIDE SERPL-SCNC: 107 MMOL/L (ref 94–109)
CO2 SERPL-SCNC: 28 MMOL/L (ref 20–32)
COLOR CSF: COLORLESS
COLOR UR AUTO: YELLOW
CREAT SERPL-MCNC: 0.95 MG/DL (ref 0.52–1.04)
DIFFERENTIAL METHOD BLD: ABNORMAL
EOSINOPHIL # BLD AUTO: 0 10E9/L (ref 0–0.7)
EOSINOPHIL NFR BLD AUTO: 0.2 %
ERYTHROCYTE [DISTWIDTH] IN BLOOD BY AUTOMATED COUNT: 14.9 % (ref 10–15)
FLUAV RNA RESP QL NAA+PROBE: NEGATIVE
FLUBV RNA RESP QL NAA+PROBE: NEGATIVE
GFR SERPL CREATININE-BSD FRML MDRD: 66 ML/MIN/{1.73_M2}
GLUCOSE CSF-MCNC: 69 MG/DL (ref 40–70)
GLUCOSE SERPL-MCNC: 120 MG/DL (ref 70–99)
GLUCOSE UR STRIP-MCNC: NEGATIVE MG/DL
GRAM STN SPEC: NORMAL
HCT VFR BLD AUTO: 35.8 % (ref 35–47)
HGB BLD-MCNC: 10.8 G/DL (ref 11.7–15.7)
HGB UR QL STRIP: NEGATIVE
IMM GRANULOCYTES # BLD: 0.1 10E9/L (ref 0–0.4)
IMM GRANULOCYTES NFR BLD: 0.4 %
KETONES UR STRIP-MCNC: NEGATIVE MG/DL
LABORATORY COMMENT REPORT: NORMAL
LACTATE BLD-SCNC: 0.5 MMOL/L (ref 0.7–2)
LEUKOCYTE ESTERASE UR QL STRIP: NEGATIVE
LIPASE SERPL-CCNC: 19 U/L (ref 73–393)
LYMPHOCYTES # BLD AUTO: 2.1 10E9/L (ref 0.8–5.3)
LYMPHOCYTES NFR BLD AUTO: 15.9 %
MCH RBC QN AUTO: 28.3 PG (ref 26.5–33)
MCHC RBC AUTO-ENTMCNC: 30.2 G/DL (ref 31.5–36.5)
MCV RBC AUTO: 94 FL (ref 78–100)
MONOCYTES # BLD AUTO: 0.8 10E9/L (ref 0–1.3)
MONOCYTES NFR BLD AUTO: 6.1 %
MUCOUS THREADS #/AREA URNS LPF: PRESENT /LPF
NEUTROPHILS # BLD AUTO: 10.2 10E9/L (ref 1.6–8.3)
NEUTROPHILS NFR BLD AUTO: 77.1 %
NITRATE UR QL: NEGATIVE
NRBC # BLD AUTO: 0 10*3/UL
NRBC BLD AUTO-RTO: 0 /100
PH UR STRIP: 5.5 PH (ref 5–7)
PLATELET # BLD AUTO: 327 10E9/L (ref 150–450)
POTASSIUM SERPL-SCNC: 3.5 MMOL/L (ref 3.4–5.3)
PROT CSF-MCNC: 27 MG/DL (ref 15–60)
PROT SERPL-MCNC: 6.8 G/DL (ref 6.8–8.8)
RBC # BLD AUTO: 3.82 10E12/L (ref 3.8–5.2)
RBC # CSF MANUAL: 0 /UL (ref 0–2)
RBC # CSF MANUAL: NORMAL /UL (ref 0–2)
RBC #/AREA URNS AUTO: ABNORMAL /HPF (ref 0–2)
RSV RNA SPEC QL NAA+PROBE: NORMAL
SARS-COV-2 RNA RESP QL NAA+PROBE: NEGATIVE
SODIUM SERPL-SCNC: 139 MMOL/L (ref 133–144)
SOURCE: ABNORMAL
SP GR UR STRIP: 1.03 (ref 1–1.03)
SPECIMEN SOURCE: NORMAL
SPECIMEN SOURCE: NORMAL
SQUAMOUS #/AREA URNS AUTO: ABNORMAL /HPF (ref 0–1)
TROPONIN I SERPL-MCNC: <0.015 UG/L (ref 0–0.04)
TUBE # CSF: 4 #
UROBILINOGEN UR STRIP-MCNC: NORMAL MG/DL (ref 0–2)
WBC # BLD AUTO: 13.2 10E9/L (ref 4–11)
WBC # CSF MANUAL: 1 /UL (ref 0–5)
WBC # CSF MANUAL: NORMAL /UL (ref 0–5)
WBC #/AREA URNS AUTO: ABNORMAL /HPF (ref 0–5)

## 2021-02-28 PROCEDURE — 89050 BODY FLUID CELL COUNT: CPT | Performed by: EMERGENCY MEDICINE

## 2021-02-28 PROCEDURE — 80076 HEPATIC FUNCTION PANEL: CPT | Performed by: EMERGENCY MEDICINE

## 2021-02-28 PROCEDURE — 71045 X-RAY EXAM CHEST 1 VIEW: CPT

## 2021-02-28 PROCEDURE — 87205 SMEAR GRAM STAIN: CPT | Performed by: EMERGENCY MEDICINE

## 2021-02-28 PROCEDURE — 87040 BLOOD CULTURE FOR BACTERIA: CPT | Mod: XS | Performed by: EMERGENCY MEDICINE

## 2021-02-28 PROCEDURE — 81001 URINALYSIS AUTO W/SCOPE: CPT | Performed by: EMERGENCY MEDICINE

## 2021-02-28 PROCEDURE — 82945 GLUCOSE OTHER FLUID: CPT | Performed by: EMERGENCY MEDICINE

## 2021-02-28 PROCEDURE — 87015 SPECIMEN INFECT AGNT CONCNTJ: CPT | Performed by: EMERGENCY MEDICINE

## 2021-02-28 PROCEDURE — 250N000013 HC RX MED GY IP 250 OP 250 PS 637: Performed by: EMERGENCY MEDICINE

## 2021-02-28 PROCEDURE — 84484 ASSAY OF TROPONIN QUANT: CPT | Performed by: EMERGENCY MEDICINE

## 2021-02-28 PROCEDURE — 83605 ASSAY OF LACTIC ACID: CPT | Performed by: EMERGENCY MEDICINE

## 2021-02-28 PROCEDURE — 258N000003 HC RX IP 258 OP 636: Performed by: EMERGENCY MEDICINE

## 2021-02-28 PROCEDURE — 96360 HYDRATION IV INFUSION INIT: CPT

## 2021-02-28 PROCEDURE — 87070 CULTURE OTHR SPECIMN AEROBIC: CPT | Mod: XS | Performed by: EMERGENCY MEDICINE

## 2021-02-28 PROCEDURE — 84157 ASSAY OF PROTEIN OTHER: CPT | Performed by: EMERGENCY MEDICINE

## 2021-02-28 PROCEDURE — 80048 BASIC METABOLIC PNL TOTAL CA: CPT | Performed by: EMERGENCY MEDICINE

## 2021-02-28 PROCEDURE — 85025 COMPLETE CBC W/AUTO DIFF WBC: CPT | Performed by: EMERGENCY MEDICINE

## 2021-02-28 PROCEDURE — 87636 SARSCOV2 & INF A&B AMP PRB: CPT | Performed by: EMERGENCY MEDICINE

## 2021-02-28 PROCEDURE — 83690 ASSAY OF LIPASE: CPT | Performed by: EMERGENCY MEDICINE

## 2021-02-28 RX ORDER — ACETAMINOPHEN 325 MG/1
975 TABLET ORAL ONCE
Status: COMPLETED | OUTPATIENT
Start: 2021-02-28 | End: 2021-02-28

## 2021-02-28 RX ADMIN — ACETAMINOPHEN 975 MG: 325 TABLET, FILM COATED ORAL at 00:14

## 2021-02-28 RX ADMIN — SODIUM CHLORIDE 1000 ML: 9 INJECTION, SOLUTION INTRAVENOUS at 02:58

## 2021-02-28 RX ADMIN — SODIUM CHLORIDE 1000 ML: 9 INJECTION, SOLUTION INTRAVENOUS at 02:59

## 2021-02-28 ASSESSMENT — ENCOUNTER SYMPTOMS
HEADACHES: 1
NECK PAIN: 0
VOMITING: 0
DIARRHEA: 1
NAUSEA: 1
FEVER: 1
COUGH: 0
ABDOMINAL PAIN: 1

## 2021-02-28 NOTE — ED PROVIDER NOTES
History     Chief Complaint:  Flu Symptoms    HPI  Lynn Thompson is a 56 year old female with a history of HTN and diabetes, s/p pancreatic islet cell transplant who presents to the emergency department for evaluation of flu symptoms.  Patient reports onset of fever of 101.2 earlier today with several episodes of diarrhea, headache, nausea, and vague abdominal pain.  She states she has had similar symptoms before when she was diagnosed with urosepsis.  She took Tylenol prior to arrival.  She has not had Covid and has not received the Covid vaccine.  She denies neck pain, cough, and vomiting.    Review of Systems   Constitutional: Positive for fever.   Respiratory: Negative for cough.    Gastrointestinal: Positive for abdominal pain, diarrhea and nausea. Negative for vomiting.   Musculoskeletal: Negative for neck pain.   Neurological: Positive for headaches.   All other systems reviewed and are negative.    Allergies:  Corticosteroids  Povidone Iodine  Naproxen  Nsaids  Sulfasalazine    Medications:    Viokace  Duloxetine  Lexapro  Pepcid  Gabapentin  Hydroxyzine  Insulin  Lactobacillus  Levothyroxine  Loratadine  Modafinil  Omeprazole    Past Medical History:    BPPV  Kidney stone  H/o Chronic pancreatitis  Depression  Diabetes  HTN  Headaches  Dyspepsia  THERESA  Chronic pain  BLU  Hypothyroidism    Past Surgical History:    Appendectomy  Cholecystectomy  Laser holmium lithotripsy, ureteral stent placement, right x2  Gastric bypass  Left ureter stent placement  Hernia repair  Irrigation debridement left hand  ESWL  Laparoscopic lysis of adhesions  Right bunionectomy  T&A  Partial ileum resection  Right ovarian cyst removal      Family History:    COPD  Kidney stones  Substance abuse  Depression  Asthma    Social History:  The patient presents to the emergency department alone.    Physical Exam     Patient Vitals for the past 24 hrs:   BP Temp Temp src Pulse Resp SpO2   21 0550 106/61 98.8  F (37.1   C) Oral -- -- 96 %   02/28/21 0530 110/67 -- -- 59 -- 97 %   02/28/21 0520 109/75 -- -- -- -- 98 %   02/28/21 0500 96/44 -- -- 61 -- 98 %   02/28/21 0440 111/96 -- -- 65 -- 98 %   02/28/21 0420 91/52 -- -- -- -- 99 %   02/28/21 0410 91/52 -- -- -- -- 96 %   02/28/21 0400 94/50 -- -- 58 -- 97 %   02/28/21 0345 94/55 -- -- 57 -- 97 %   02/28/21 0315 95/52 -- -- 58 -- 98 %   02/28/21 0300 91/47 -- -- 58 -- 95 %   02/28/21 0220 -- -- -- -- -- 95 %   02/28/21 0215 91/49 -- -- 61 -- --   02/28/21 0210 94/50 98.8  F (37.1  C) Oral 62 -- 97 %   02/27/21 2350 119/67 100.8  F (38.2  C) Oral 98 20 98 %     Physical Exam  Nursing note and vitals reviewed.  Constitutional: Cooperative.   HENT:   Mouth/Throat: Mucous membranes are normal. No Neck rigidity.   Cardiovascular: Normal rate, regular rhythm and normal heart sounds.  No murmur.  Pulmonary/Chest: Effort normal and breath sounds normal. No respiratory distress. No wheezes. No rales.   Abdominal: Soft. Normal appearance and bowel sounds are normal. No distension. There is no tenderness. There is no rigidity and no guarding.   Neurological: Alert. Oriented x4.  Strength normal.   Skin: Skin is warm and dry.  Psychiatric: Normal mood and affect.      Emergency Department Course   Imaging:  XR Chest 1 view, portable:   IMPRESSION: Heart size is normal. Lungs are clear. No visible pneumothorax or pleural effusion. as per radiology.    Laboratory:  CBC: WBC: 13.2 (H), HGB: 10.8 (L), PLT: 327  BMP: Glucose 120 (H), o/w WNL (Creatinine: 0.5)  Hepatic Panel: Albumin: 3.2 (L), o/w Negative  Lipase: 19 (L)    UA: Mucous: Present, o/w Negative    Troponin (Collected 0031): <0.015    Lactic acid (Resulted 322): 0.5 (L)    Blood Cultures x2: Pending    Symptomatic Influenza A/B antigen & COVID-19 PCR: Negative    CSF:  Protein total CSF: 27  Glucose CSF: 69  Cell count with differential CSF: WNL  Gram stain: Pending  CSF culture aerobic bacterial: Pending    Procedures    Lumbar  Puncture      Indication:  headache and fever       Consent:  Risks (including but not limited to; infection, bleeding, spinal headache with possibility of spinal patch and temporary or permanent neurologic injury), benefits and alternatives were discussed with patient and consent for procedure was obtained.     Timeout:  Universal protocol was followed. TIME OUT conducted just prior to starting procedure confirmed patient identity, site/side, procedure, patient position, and availability of correct equipment? Yes     Medication:  Lidocaine: Local infiltration     Procedure Note:  Patient was placed in a left lateral decubitus position.  The low back was prepped with Betadine.  The patient was medicated as above.  A spinal needle was used to gain access to the subarachnoid space with stylet in place. The fluid was clear, total of 4 mL obtained.  Stylet was replaced and needle withdrawn.     Patient Status:  Patient tolerated the procedure well.  There were no complications.    Reviewed:  I reviewed the patient's nursing notes, vitals, past medical records, Care Everywhere.     Assessments:  205 I assessed the patient. Exam findings described above.    454 I reassessed and updated the patient. Patient still has a headache after Tylenol.    515 Lumbar puncture performed as above.    Interventions:  0014 Tylenol 975 mg Oral  258 NS 1000 mL IV  259 NS 1000 mL IV    Disposition:  Discharged to home.    Impression & Plan    Medical Decision Making:  Lynn Thompson is a 56 year old female with the above history who presents with fever and headache. She is non toxic appearing though on my initial evaluation after a prolonged stay in the waiting room she was noted to be hypotensive. Though not documented her blood pressure at that time was 82/50. She has since responded to IV fluid with current stable systolic blood pressures in the 100-110's range. She has been bradycardic if anything here in the ED. Etiology of her  fever is unclear. COVID is negative. She has a mild leukocytosis and blood cultures are sent. Urinalysis does not show signs concerning for infection nor does her spinal fluid analysis. At this time I will hold on antimicrobials without a definite source of infection. Plan of care is close primary care follow up with return precautions and follow up of her pending cultures.    Covid-19  Lynn Thompson was evaluated during a global COVID-19 pandemic, which necessitated consideration that the patient might be at risk for infection with the SARS-CoV-2 virus that causes COVID-19.   Applicable protocols for evaluation were followed during the patient's care.   COVID-19 was considered as part of the patient's evaluation. The plan for testing is:  a test was obtained during this visit.    Diagnosis:  (R50.9) Fever, unspecified fever cause    (R51.9) Acute nonintractable headache    (D72.829) Leukocytosis      Rohit Mulligan  2/28/2021   EMERGENCY DEPARTMENT  Scribe Disclosure:  I, Rohit Mulligan, am serving as a scribe at 2:05 AM on 2/28/2021 to document services personally performed by Mo Layton MD based on my observations and the provider's statements to me.          Mo Layton MD  02/28/21 0701

## 2021-03-01 ENCOUNTER — HOSPITAL ENCOUNTER (EMERGENCY)
Facility: CLINIC | Age: 56
Discharge: HOME | End: 2021-03-01
Attending: EMERGENCY MEDICINE | Admitting: EMERGENCY MEDICINE
Payer: MEDICARE

## 2021-03-01 ENCOUNTER — APPOINTMENT (OUTPATIENT)
Dept: CT IMAGING | Facility: CLINIC | Age: 56
End: 2021-03-01
Attending: EMERGENCY MEDICINE
Payer: MEDICARE

## 2021-03-01 VITALS
SYSTOLIC BLOOD PRESSURE: 118 MMHG | TEMPERATURE: 98.8 F | OXYGEN SATURATION: 94 % | HEART RATE: 71 BPM | DIASTOLIC BLOOD PRESSURE: 61 MMHG | RESPIRATION RATE: 18 BRPM

## 2021-03-01 DIAGNOSIS — K52.9 NON-SPECIFIC COLITIS: ICD-10-CM

## 2021-03-01 DIAGNOSIS — R50.9 FEVER, UNSPECIFIED: ICD-10-CM

## 2021-03-01 DIAGNOSIS — R52 GENERALIZED BODY ACHES: ICD-10-CM

## 2021-03-01 LAB
ALBUMIN SERPL-MCNC: 3.3 G/DL (ref 3.4–5)
ALBUMIN UR-MCNC: 20 MG/DL
ALP SERPL-CCNC: 116 U/L (ref 40–150)
ALT SERPL W P-5'-P-CCNC: 30 U/L (ref 0–50)
ANION GAP SERPL CALCULATED.3IONS-SCNC: 4 MMOL/L (ref 3–14)
APPEARANCE UR: CLEAR
AST SERPL W P-5'-P-CCNC: 18 U/L (ref 0–45)
BASOPHILS # BLD AUTO: 0 10E9/L (ref 0–0.2)
BASOPHILS NFR BLD AUTO: 0.3 %
BILIRUB SERPL-MCNC: 0.2 MG/DL (ref 0.2–1.3)
BILIRUB UR QL STRIP: NEGATIVE
BUN SERPL-MCNC: 17 MG/DL (ref 7–30)
CALCIUM SERPL-MCNC: 8.5 MG/DL (ref 8.5–10.1)
CHLORIDE SERPL-SCNC: 111 MMOL/L (ref 94–109)
CO2 SERPL-SCNC: 26 MMOL/L (ref 20–32)
COLOR UR AUTO: ABNORMAL
CREAT SERPL-MCNC: 0.85 MG/DL (ref 0.52–1.04)
DIFFERENTIAL METHOD BLD: ABNORMAL
EOSINOPHIL # BLD AUTO: 0.1 10E9/L (ref 0–0.7)
EOSINOPHIL NFR BLD AUTO: 0.6 %
ERYTHROCYTE [DISTWIDTH] IN BLOOD BY AUTOMATED COUNT: 14.8 % (ref 10–15)
FLUAV RNA RESP QL NAA+PROBE: NEGATIVE
FLUBV RNA RESP QL NAA+PROBE: NEGATIVE
GFR SERPL CREATININE-BSD FRML MDRD: 75 ML/MIN/{1.73_M2}
GLUCOSE SERPL-MCNC: 141 MG/DL (ref 70–99)
GLUCOSE UR STRIP-MCNC: NEGATIVE MG/DL
HCT VFR BLD AUTO: 38.8 % (ref 35–47)
HGB BLD-MCNC: 11.5 G/DL (ref 11.7–15.7)
HGB UR QL STRIP: NEGATIVE
IMM GRANULOCYTES # BLD: 0 10E9/L (ref 0–0.4)
IMM GRANULOCYTES NFR BLD: 0.2 %
KETONES UR STRIP-MCNC: NEGATIVE MG/DL
LABORATORY COMMENT REPORT: NORMAL
LACTATE BLD-SCNC: 0.8 MMOL/L (ref 0.7–2)
LEUKOCYTE ESTERASE UR QL STRIP: NEGATIVE
LIPASE SERPL-CCNC: 17 U/L (ref 73–393)
LYMPHOCYTES # BLD AUTO: 0.8 10E9/L (ref 0.8–5.3)
LYMPHOCYTES NFR BLD AUTO: 6.2 %
MCH RBC QN AUTO: 28.1 PG (ref 26.5–33)
MCHC RBC AUTO-ENTMCNC: 29.6 G/DL (ref 31.5–36.5)
MCV RBC AUTO: 95 FL (ref 78–100)
MONOCYTES # BLD AUTO: 0.8 10E9/L (ref 0–1.3)
MONOCYTES NFR BLD AUTO: 6.3 %
MUCOUS THREADS #/AREA URNS LPF: PRESENT /LPF
NEUTROPHILS # BLD AUTO: 10.9 10E9/L (ref 1.6–8.3)
NEUTROPHILS NFR BLD AUTO: 86.4 %
NITRATE UR QL: NEGATIVE
NRBC # BLD AUTO: 0 10*3/UL
NRBC BLD AUTO-RTO: 0 /100
PH UR STRIP: 6 PH (ref 5–7)
PLATELET # BLD AUTO: 335 10E9/L (ref 150–450)
POTASSIUM SERPL-SCNC: 3.5 MMOL/L (ref 3.4–5.3)
PROT SERPL-MCNC: 6.9 G/DL (ref 6.8–8.8)
RBC # BLD AUTO: 4.09 10E12/L (ref 3.8–5.2)
RBC #/AREA URNS AUTO: 2 /HPF (ref 0–2)
RSV RNA SPEC QL NAA+PROBE: NORMAL
SARS-COV-2 RNA RESP QL NAA+PROBE: NEGATIVE
SODIUM SERPL-SCNC: 141 MMOL/L (ref 133–144)
SOURCE: ABNORMAL
SP GR UR STRIP: 1.02 (ref 1–1.03)
SPECIMEN SOURCE: NORMAL
SQUAMOUS #/AREA URNS AUTO: 1 /HPF (ref 0–1)
UROBILINOGEN UR STRIP-MCNC: NORMAL MG/DL (ref 0–2)
WBC # BLD AUTO: 12.6 10E9/L (ref 4–11)
WBC #/AREA URNS AUTO: 1 /HPF (ref 0–5)

## 2021-03-01 PROCEDURE — 87040 BLOOD CULTURE FOR BACTERIA: CPT | Mod: XS | Performed by: EMERGENCY MEDICINE

## 2021-03-01 PROCEDURE — 258N000003 HC RX IP 258 OP 636: Performed by: EMERGENCY MEDICINE

## 2021-03-01 PROCEDURE — 81001 URINALYSIS AUTO W/SCOPE: CPT | Performed by: EMERGENCY MEDICINE

## 2021-03-01 PROCEDURE — C9803 HOPD COVID-19 SPEC COLLECT: HCPCS

## 2021-03-01 PROCEDURE — 250N000011 HC RX IP 250 OP 636: Performed by: EMERGENCY MEDICINE

## 2021-03-01 PROCEDURE — 36415 COLL VENOUS BLD VENIPUNCTURE: CPT | Performed by: EMERGENCY MEDICINE

## 2021-03-01 PROCEDURE — 96360 HYDRATION IV INFUSION INIT: CPT

## 2021-03-01 PROCEDURE — 250N000013 HC RX MED GY IP 250 OP 250 PS 637: Performed by: EMERGENCY MEDICINE

## 2021-03-01 PROCEDURE — 85025 COMPLETE CBC W/AUTO DIFF WBC: CPT | Performed by: EMERGENCY MEDICINE

## 2021-03-01 PROCEDURE — 80053 COMPREHEN METABOLIC PANEL: CPT | Performed by: EMERGENCY MEDICINE

## 2021-03-01 PROCEDURE — 250N000009 HC RX 250: Performed by: EMERGENCY MEDICINE

## 2021-03-01 PROCEDURE — G1004 CDSM NDSC: HCPCS

## 2021-03-01 PROCEDURE — 99285 EMERGENCY DEPT VISIT HI MDM: CPT | Mod: 25

## 2021-03-01 PROCEDURE — 83605 ASSAY OF LACTIC ACID: CPT | Performed by: EMERGENCY MEDICINE

## 2021-03-01 PROCEDURE — 96361 HYDRATE IV INFUSION ADD-ON: CPT

## 2021-03-01 PROCEDURE — 87636 SARSCOV2 & INF A&B AMP PRB: CPT | Performed by: EMERGENCY MEDICINE

## 2021-03-01 PROCEDURE — 83690 ASSAY OF LIPASE: CPT | Performed by: EMERGENCY MEDICINE

## 2021-03-01 RX ORDER — IOPAMIDOL 755 MG/ML
500 INJECTION, SOLUTION INTRAVASCULAR ONCE
Status: COMPLETED | OUTPATIENT
Start: 2021-03-01 | End: 2021-03-01

## 2021-03-01 RX ORDER — SODIUM CHLORIDE 9 MG/ML
1000 INJECTION, SOLUTION INTRAVENOUS CONTINUOUS
Status: DISCONTINUED | OUTPATIENT
Start: 2021-03-01 | End: 2021-03-01 | Stop reason: HOSPADM

## 2021-03-01 RX ORDER — CIPROFLOXACIN 500 MG/1
500 TABLET, FILM COATED ORAL 2 TIMES DAILY
Qty: 10 TABLET | Refills: 0 | Status: SHIPPED | OUTPATIENT
Start: 2021-03-01 | End: 2021-03-06

## 2021-03-01 RX ORDER — ACETAMINOPHEN 500 MG
1000 TABLET ORAL ONCE
Status: COMPLETED | OUTPATIENT
Start: 2021-03-01 | End: 2021-03-01

## 2021-03-01 RX ORDER — FLUCONAZOLE 150 MG/1
TABLET ORAL
Qty: 2 TABLET | Refills: 0 | Status: SHIPPED | OUTPATIENT
Start: 2021-03-01 | End: 2021-03-04

## 2021-03-01 RX ORDER — CIPROFLOXACIN 500 MG/1
500 TABLET, FILM COATED ORAL ONCE
Status: COMPLETED | OUTPATIENT
Start: 2021-03-01 | End: 2021-03-01

## 2021-03-01 RX ADMIN — IOPAMIDOL 63 ML: 755 INJECTION, SOLUTION INTRAVENOUS at 02:40

## 2021-03-01 RX ADMIN — CIPROFLOXACIN HYDROCHLORIDE 500 MG: 500 TABLET, FILM COATED ORAL at 04:06

## 2021-03-01 RX ADMIN — SODIUM CHLORIDE 1000 ML: 9 INJECTION, SOLUTION INTRAVENOUS at 02:11

## 2021-03-01 RX ADMIN — SODIUM CHLORIDE 56 ML: 9 INJECTION, SOLUTION INTRAVENOUS at 02:40

## 2021-03-01 RX ADMIN — ACETAMINOPHEN 1000 MG: 500 TABLET, FILM COATED ORAL at 02:30

## 2021-03-01 ASSESSMENT — ENCOUNTER SYMPTOMS
HEADACHES: 0
NECK STIFFNESS: 0
VOMITING: 1
COUGH: 0
SHORTNESS OF BREATH: 0
MYALGIAS: 1
FEVER: 1
ABDOMINAL PAIN: 1
NAUSEA: 1

## 2021-03-01 NOTE — ED PROVIDER NOTES
"  History   Chief Complaint:  Fever       HPI   Lynn Thompson is a 56 year old female with history of hypertension and diabetes who presents with fever, generalized body aches.  Patient was actually seen here yesterday for similar symptoms, please see work-up below which was reassuring.  Patient reports actually feeling better with headache and generalized body aches today but again had onset of fever to 102F at 11 PM.  She took 4 Advil at 9 PM.  Dates that she \"feels like she was hit by a truck\".  She denies any worsening of her symptoms but was told that any fever above 101F to be evaluated given her history of pancreatic islet cell transplantation.  She denies any worsening headache, neck stiffness, chest pain, shortness of breath, cough.  She has an episode of nausea/vomiting x1 prior to arrival and has had loose stools since yesterday with intermittent lower abdominal cramping.  She denies any urinary symptoms or rashes. She has had no recent antibiotics. Her  stools are mushy but not watery.     Laboratory from [facility] 2/28/21:  CBC: WBC: 13.2 (H), HGB: 10.8 (L), PLT: 327  BMP: Glucose 120 (H), o/w WNL (Creatinine: 0.5)  Hepatic Panel: Albumin: 3.2 (L), o/w Negative  Lipase: 19 (L)  UA: Mucous: Present, o/w Negative  Troponin (Collected 0031): <0.015  Lactic acid (Resulted 322): 0.5 (L)  Blood Cultures x2: Pending  Symptomatic Influenza A/B antigen & COVID-19 PCR: Negative     CSF:  Protein total CSF: 27  Glucose CSF: 69  Cell count with differential CSF: WNL  Gram stain: Pending  CSF culture aerobic bacterial: Pending    Review of Systems   Constitutional: Positive for fever.   Respiratory: Negative for cough and shortness of breath.    Cardiovascular: Negative for chest pain.   Gastrointestinal: Positive for abdominal pain, nausea and vomiting.   Genitourinary: Negative.    Musculoskeletal: Positive for myalgias. Negative for neck stiffness.   Skin: Negative for rash.   Neurological: Negative for " headaches.   All other systems reviewed and are negative.       Allergies:  Corticosteroids  Povidone Iodine  Naproxen  Nsaids  Sulfasalazine     Medications:    Viokace  Duloxetine  Lexapro  Pepcid  Gabapentin  Hydroxyzine  Insulin  Lactobacillus  Levothyroxine  Loratadine  Modafinil  Omeprazole     Past Medical History:    BPPV  Kidney stone  H/o Chronic pancreatitis  Depression  Diabetes  HTN  Headaches  Dyspepsia  THERESA  Chronic pain  BLU  Hypothyroidism     Past Surgical History:    Appendectomy  Cholecystectomy  Laser holmium lithotripsy, ureteral stent placement, right x2  Gastric bypass  Left ureter stent placement  Hernia repair  Irrigation debridement left hand  ESWL  Laparoscopic lysis of adhesions  Right bunionectomy  T&A  Partial ileum resection  Right ovarian cyst removal       Family History:    COPD  Kidney stones  Substance abuse  Depression  Asthma    Social History:  The patient presents alone.     Physical Exam     Patient Vitals for the past 24 hrs:   BP Temp Temp src Pulse Resp SpO2   21 0425 118/61 -- -- 71 -- 94 %   21 0115 (!) 152/74 -- -- 101 18 --   21 0006 (!) 140/77 98.8  F (37.1  C) Temporal 97 20 100 %       Physical Exam   General: Alert, no acute distress; well appearing  Neuro:  PERRL.  EOMI.  No focal deficits  HEENT:  Moist mucous membranes.  Posterior oropharynx clear, no exudates.  Conjunctiva normal. No meningismus.  CV:  RRR, no m/r/g, skin warm and well perfused  Pulm:  CTAB, no wheezes/ronchi/rales.  No acute distress, breathing comfortably  GI:  Soft, mild general tenderness, nondistended.  No rebound or guarding.  Normal bowel sounds  MSK:  Moving all extremities.  No focal areas of edema, erythema, or tenderness  Skin:  WWP, no rashes, no lower extremity edema, skin color normal, no diaphoresis  Psych:  Well-appearing, normal affect, regular speech    Emergency Department Course       Imaging:  Abd/pelvis CT,  IV  contrast only TRAUMA / AAA  Final  Result  IMPRESSION:   1.  Findings consistent with a nonspecific proximal colitis. No free air.  Reading per radiology     Laboratory:  UA with microscopic: protein albumin urine 20 (A), mucous urine present (A), o/w WNL      Blood Culture x2: Pending     CBC:  WBC 12.6 (H), HGB 11.5 (L), , o/w WNL     CMP: Glucose 141 (H), chloride 111 (H), albumin 3.3 (L), o/w WNL (Creatinine: 0.85)     Lactic Acid whole blood (0208): 0.8      Symptomatic Influenza A/B & SARS-CoV2 (COVID-19) Virus PCR Multiplex: negative     Lipase: 17 (L)      Emergency Department Course:    Reviewed:  I reviewed the patient's nursing notes, vitals, past medical records, Care Everywhere.     Assessments:  0136  I performed an exam of the patient as documented above.   0340 Patient rechecked and is doing better.     Interventions:  0211 0.9% sodium chloride BOLUS 1000 mL IV   0230 Tylenol 1000 mg oral   0406 Cipro 500 mg oral     Disposition:  Discharged to home.      Impression & Plan   Covid-19  Lynn Thompson was evaluated during a global COVID-19 pandemic, which necessitated consideration that the patient might be at risk for infection with the SARS-CoV-2 virus that causes COVID-19.   Applicable protocols for evaluation were followed during the patient's care.   COVID-19 was considered as part of the patient's evaluation. The plan for testing is:  a test was obtained during this visit.    Medical Decision Making:  Lynn Thompson is a 56 year old female with history significant for hypertension, diabetes status post pancreatic islet cell transplantation presents to the ER for evaluation of generalized body aches, fevers.  Please see above for details of HPI and exam.  She is otherwise well-appearing and was actually here yesterday for similar symptoms with reassuring work-up including LP.  Given recurrence of fever, she presents again for further evaluation.  She denies any worsening headache or neck stiffness but has had abdominal  cramping and loose stools.  She is afebrile and hemodynamically stable.  She had mild generalized tenderness to the abdomen without any involuntary guarding or rebound tenderness to suggest perforated viscus.  Chest x-ray yesterday was normal and she has no respiratory complaints to suggest pneumonia.  Repeat laboratory testing was undertaken shows persistent but improved nonspecific leukocytosis, negative COVID-19/influenza swab.  Lactic acid is normal.  Blood cultures were resent.  Blood cultures yesterday still NGTD.  UA again is unremarkable.  CT abdomen pelvis shows nonspecific proximal colitis.  Doubt this represents ischemic colitis or mesenteric ischemia.  Certainly could be viral cause but given fevers, will empirically treat for potential bacterial etiology.  No risk factors to suggest C. difficile.  Patient unable to give stool sample in the ER.  Given her overall well appearance and again reassuring exam, I feel that she is safe to discharge home with empiric Cipro x5 days.  She was given a stool sample kit to provide for her PCP.  She will continue with Tylenol/ibuprofen as needed for fever, body aches.  Reasons to return to the ER were discussed and all questions were answered prior to discharge.        Diagnosis:    ICD-10-CM    1. Non-specific colitis  K52.9 Blood culture     Blood culture     CANCELED: Enteric Bacteria and Virus Panel by DILMA Stool     CANCELED: Clostridium difficile toxin B PCR   2. Fever, unspecified  R50.9    3. Generalized body aches  R52        Discharge Medications:  Discharge Medication List as of 3/1/2021  3:55 AM      START taking these medications    Details   ciprofloxacin (CIPRO) 500 MG tablet Take 1 tablet (500 mg) by mouth 2 times daily for 5 days, Disp-10 tablet, R-0, Local Print      fluconazole (DIFLUCAN) 150 MG tablet Take one tablet now, and one tablet in three days, Disp-2 tablet, R-0, Local Print             Scribe Disclosure:  Aquilino SALDANA, am serving as a  scribe at 1:36 AM on 3/1/2021 to document services personally performed by J Carlos Zuñiga MD based on my observations and the provider's statements to me.        J Carlos Zuñiga MD  03/01/21 0609

## 2021-03-01 NOTE — ED TRIAGE NOTES
Seen here for fevers and weakness yesterday. Back for the same. Not getting better. Had a spinal tap yesterday as well.

## 2021-03-02 ENCOUNTER — TELEPHONE (OUTPATIENT)
Dept: FAMILY MEDICINE | Facility: CLINIC | Age: 56
End: 2021-03-02

## 2021-03-02 DIAGNOSIS — R10.84 ABDOMINAL PAIN, GENERALIZED: ICD-10-CM

## 2021-03-02 PROCEDURE — 87506 IADNA-DNA/RNA PROBE TQ 6-11: CPT | Performed by: EMERGENCY MEDICINE

## 2021-03-02 NOTE — TELEPHONE ENCOUNTER
Name: Lynn Thompson MRN: [id number]     Date: 3/2/2021 Status: Scheduled     Time: 2:40 PM Length: 30     Visit Type: OFFICE VISIT SB2-4 [2421] Copay: $0.00     Provider: Maryana Brooks MD Department:  FAMILY PRACTICE     Bill Area: Family Medicine      Encounter #: [id number] Notes: LVM 03/02/21 @ 736AM NEEDS TO RESCHEDULRE PROVIDER NOT IN CLINIC,[facility] ER follow up / D/c 2/28/21  needs PHQ9   Printed on:         Made On:  Change Notes:  Change Notes: 3/1/2021 1:24 PM  3/1/2021 3:57 PM  3/2/2021 7:36 AM By:  By:  By: KEY HENAO DEANNA E CHAMBERLAIN, CAITLIN       LEFT MESSAGE NEEDS TO RESCHEDULE PROVIDER NOT IN     Estefani Berry/

## 2021-03-03 ENCOUNTER — TELEPHONE (OUTPATIENT)
Dept: EMERGENCY MEDICINE | Facility: CLINIC | Age: 56
End: 2021-03-03

## 2021-03-03 LAB
C COLI+JEJUNI+LARI FUSA STL QL NAA+PROBE: ABNORMAL
EC STX1 GENE STL QL NAA+PROBE: NOT DETECTED
EC STX2 GENE STL QL NAA+PROBE: NOT DETECTED
ENTERIC PATHOGEN COMMENT: ABNORMAL
NOROV GI+II ORF1-ORF2 JNC STL QL NAA+PR: NOT DETECTED
RVA NSP5 STL QL NAA+PROBE: NOT DETECTED
SALMONELLA SP RPOD STL QL NAA+PROBE: NOT DETECTED
SHIGELLA SP+EIEC IPAH STL QL NAA+PROBE: NOT DETECTED
V CHOL+PARA RFBL+TRKH+TNAA STL QL NAA+PR: NOT DETECTED
Y ENTERO RECN STL QL NAA+PROBE: NOT DETECTED

## 2021-03-03 NOTE — LETTER
March 5, 2021        Lynn Thompson  [address] [address] [id number]          Dear Lynn Thompson:    You were seen in the [facility] Emergency Department on March 1, 2021 and we have been unable to reach you by phone, so we are sending you this letter.     It is important that you call [facility] Emergency Department lab result nurse at [phone], as we have information to relay to you AND/OR we MAY have to make some changes in your treatment.      Best time to call back is between 10 a.m. and 6 p.m, 7 days a week.      Sincerely,     [facility] Emergency Department Lab Result RN  [phone]

## 2021-03-03 NOTE — RESULT ENCOUNTER NOTE
Final Enteric Bacteria and Virus Panel by DILMA Stool is POSITIVE for Campylobacter group  Patient to be notified, symptom's assessed and advised per [place] ED lab result protocol.

## 2021-03-03 NOTE — RESULT ENCOUNTER NOTE
Left voicemail message requesting a call back to [facility] ED Lab Result RN at [phone].  RN is available every day between 10 a.m. and 6:30 p.m.  See Telephone encounter.

## 2021-03-03 NOTE — TELEPHONE ENCOUNTER
"[facility] Emergency Department/Urgent Care Lab result notification:    [place] ED lab result protocol used  Campylobacter    Reason for call  Notify of lab results, assess symptoms,  review ED providers recommendations/discharge instructions (if necessary) and advise per ED lab result f/u protocol    Lab Result   Final Enteric Bacteria and Virus Panel by DILMA Stool is POSITIVE for Campylobacter group  Patient to be notified, symptom's assessed and advised per [place] ED lab result protocol.    Information table from ED Provider visit on 3/2/21  Symptoms reported at ED visit (Chief complaint, HPI) Chief Complaint:  Fever   HPI   Lynn Thompson is a 56 year old female with history of hypertension and diabetes who presents with fever, generalized body aches.  Patient was actually seen here yesterday for similar symptoms, please see work-up below which was reassuring.  Patient reports actually feeling better with headache and generalized body aches today but again had onset of fever to 102F at 11 PM.  She took 4 Advil at 9 PM.  Dates that she \"feels like she was hit by a truck\".  She denies any worsening of her symptoms but was told that any fever above 101F to be evaluated given her history of pancreatic islet cell transplantation.  She denies any worsening headache, neck stiffness, chest pain, shortness of breath, cough.  She has an episode of nausea/vomiting x1 prior to arrival and has had loose stools since yesterday with intermittent lower abdominal cramping.  She denies any urinary symptoms or rashes. She has had no recent antibiotics. Her  stools are mushy but not watery.   ED providers Impression and Plan (applicable information) Lynn Thompson is a 56 year old female with history significant for hypertension, diabetes status post pancreatic islet cell transplantation presents to the ER for evaluation of generalized body aches, fevers.  Please see above for details of HPI and exam.  She is " otherwise well-appearing and was actually here yesterday for similar symptoms with reassuring work-up including LP.  Given recurrence of fever, she presents again for further evaluation.  She denies any worsening headache or neck stiffness but has had abdominal cramping and loose stools.  She is afebrile and hemodynamically stable.  She had mild generalized tenderness to the abdomen without any involuntary guarding or rebound tenderness to suggest perforated viscus.  Chest x-ray yesterday was normal and she has no respiratory complaints to suggest pneumonia.  Repeat laboratory testing was undertaken shows persistent but improved nonspecific leukocytosis, negative COVID-19/influenza swab.  Lactic acid is normal.  Blood cultures were resent.  Blood cultures yesterday still NGTD.  UA again is unremarkable.  CT abdomen pelvis shows nonspecific proximal colitis.  Doubt this represents ischemic colitis or mesenteric ischemia.  Certainly could be viral cause but given fevers, will empirically treat for potential bacterial etiology.  No risk factors to suggest C. difficile.  Patient unable to give stool sample in the ER.  Given her overall well appearance and again reassuring exam, I feel that she is safe to discharge home with empiric Cipro x5 days.  She was given a stool sample kit to provide for her PCP.  She will continue with Tylenol/ibuprofen as needed for fever, body aches.  Reasons to return to the ER were discussed and all questions were answered prior to discharge.   Miscellaneous information    J Carlos Zuñiga MD     RN Assessment (Patient s current Symptoms), include time called.  [Insert Left message here if message left]  At 5;40P, Left voicemail message requesting a call back to [facility] ED Lab Result RN at [phone].  RN is available every day between 10 a.m. and 6:30 p.m.    RN Recommendations/Instructions per [place] ED lab result protocol  Await call back from Patient  Patient was  treated with Ciprofloxacin 500 mg PO BID for 5 days by ED Provider  She has appt on 3/4/21 with Maryana Brooks MD    [RN Name]  Mitchel Maurice RN  [facility]  Emergency Dept Lab Result RN  Ph# [phone]      Copy of Lab result   Enteric Bacteria and Virus Panel by DILMA Stool  Order: [id number]  Status:  Final result   Visible to patient:  Yes (MyChart)   Dx:  Abdominal pain, generalized  Specimen Information: Feces          Ref Range & Units 1d ago 3yr ago    Campylobacter group by DILMA NDET^Not Detected Detected, Abnormal ResultAbnormal   Not Detected     Salmonella species by DILMA NDET^Not Detected Not Detected  Not Detected     Shigella species by DILMA NDET^Not Detected Not Detected  Not Detected     Vibrio group by DILMA NDET^Not Detected Not Detected  Not Detected     Rotavirus A by DILMA NDET^Not Detected Not Detected  Not Detected     Shiga toxin 1 gene by DILMA NDET^Not Detected Not Detected  Not Detected     Shiga toxin 2 gene by DILMA NDET^Not Detected Not Detected  Not Detected     Norovirus I and II by DILMA NDET^Not Detected Not Detected  Not Detected     Yersinia enterocolitica by DILMA NDET^Not Detected Not Detected  Not Detected     Enteric pathogen comment  Testing performed by multiplexed, qualitative PCR using the [employer] Enteric   Pathogens Nucleic Acid Test. Results should not be used as the sole basis for diagnosis,   treatment, or other patient management decisions.  Testing performed by multiplexed, qualitative PCR using the [employer] Enteric   Pathogens Nucleic Acid Test. Results should not be used as the sole basis for diagnosis,   treatment, or other patient management decisions.  CM      Comment: Positive results do not rule out co-infection with other organisms that are   not detected by this test, and may not be the sole or definitive cause of   patient illness.   Negative results in the setting of clinical illness compatible with    gastroenteritis may be due to infection by pathogens that are not detected by   this test or non-infectious causes such as ulcerative colitis, irritable bowel    syndrome, or Crohn's disease.   Note: Shiga toxin producing E. coli (STEC) typically harbor one or both genes   that encode for Shiga toxins 1 and 2.    Resulting Agency  [facility]         Specimen Collected: 03/02/21  8:50 AM   Last Resulted: 03/03/21  4:50 PM

## 2021-03-05 LAB
BACTERIA SPEC CULT: NO GROWTH
SPECIMEN SOURCE: NORMAL

## 2021-03-05 NOTE — TELEPHONE ENCOUNTER
[facility] Emergency Department/Urgent Care Lab result notification:    Reason for Letter being mailed out:      Patient treated in the Emergency Dept appropriate but they have NOT be notified about the Positive lab results.  Lab information to be reviewed with Patient or Parent    Unable to reach via telephone so letter sent with a message requesting a call back to [phone] between 10 a.m. and 6:30 p.m., 7 days a week for patient's ED/UC lab results.     Caty Mike  [facility]  Emergency Dept Lab Result RN  Ph# [phone]      Did leave a message with a household member for patient to call back, she is unavailable until 3/8/21

## 2021-03-07 LAB
BACTERIA SPEC CULT: NO GROWTH
BACTERIA SPEC CULT: NO GROWTH
Lab: NORMAL
SPECIMEN SOURCE: NORMAL
SPECIMEN SOURCE: NORMAL

## 2021-03-12 ENCOUNTER — IMMUNIZATION (OUTPATIENT)
Dept: NURSING | Facility: CLINIC | Age: 56
End: 2021-03-12
Payer: MEDICARE

## 2021-03-12 PROCEDURE — 91300 PR COVID VAC PFIZER DIL RECON 30 MCG/0.3 ML IM: CPT

## 2021-03-12 PROCEDURE — 0001A PR COVID VAC PFIZER DIL RECON 30 MCG/0.3 ML IM: CPT

## 2021-03-15 ENCOUNTER — OFFICE VISIT (OUTPATIENT)
Dept: OBGYN | Facility: CLINIC | Age: 56
End: 2021-03-15
Payer: MEDICARE

## 2021-03-15 VITALS
DIASTOLIC BLOOD PRESSURE: 80 MMHG | BODY MASS INDEX: 20.37 KG/M2 | WEIGHT: 119.3 LBS | SYSTOLIC BLOOD PRESSURE: 140 MMHG | HEIGHT: 64 IN

## 2021-03-15 DIAGNOSIS — Z11.59 ENCOUNTER FOR SCREENING FOR OTHER VIRAL DISEASES: ICD-10-CM

## 2021-03-15 DIAGNOSIS — K62.3 RECTAL PROLAPSE: ICD-10-CM

## 2021-03-15 DIAGNOSIS — D50.8 OTHER IRON DEFICIENCY ANEMIA: ICD-10-CM

## 2021-03-15 DIAGNOSIS — R63.4 WEIGHT LOSS: Primary | ICD-10-CM

## 2021-03-15 LAB
ERYTHROCYTE [DISTWIDTH] IN BLOOD BY AUTOMATED COUNT: 15.2 % (ref 10–15)
HCT VFR BLD AUTO: 34.5 % (ref 35–47)
HGB BLD-MCNC: 10.9 G/DL (ref 11.7–15.7)
MCH RBC QN AUTO: 28.5 PG (ref 26.5–33)
MCHC RBC AUTO-ENTMCNC: 31.6 G/DL (ref 31.5–36.5)
MCV RBC AUTO: 90 FL (ref 78–100)
PLATELET # BLD AUTO: 531 10E9/L (ref 150–450)
RBC # BLD AUTO: 3.83 10E12/L (ref 3.8–5.2)
WBC # BLD AUTO: 7.2 10E9/L (ref 4–11)

## 2021-03-15 PROCEDURE — 85027 COMPLETE CBC AUTOMATED: CPT | Performed by: FAMILY MEDICINE

## 2021-03-15 PROCEDURE — 84443 ASSAY THYROID STIM HORMONE: CPT | Performed by: FAMILY MEDICINE

## 2021-03-15 PROCEDURE — 36415 COLL VENOUS BLD VENIPUNCTURE: CPT | Performed by: FAMILY MEDICINE

## 2021-03-15 PROCEDURE — 99213 OFFICE O/P EST LOW 20 MIN: CPT | Performed by: FAMILY MEDICINE

## 2021-03-15 RX ORDER — HEPARIN SODIUM (PORCINE) LOCK FLUSH IV SOLN 100 UNIT/ML 100 UNIT/ML
5 SOLUTION INTRAVENOUS
Status: CANCELLED | OUTPATIENT
Start: 2021-03-15

## 2021-03-15 RX ORDER — HEPARIN SODIUM,PORCINE 10 UNIT/ML
5 VIAL (ML) INTRAVENOUS
Status: CANCELLED | OUTPATIENT
Start: 2021-03-15

## 2021-03-15 ASSESSMENT — MIFFLIN-ST. JEOR: SCORE: 1106.14

## 2021-03-15 NOTE — PROGRESS NOTES
SUBJECTIVE:  Lynn Thompson is an 56 year old   woman who presents for   gynecology consult for rectal prolapse.  Patient's last menstrual period was 2013.   She is postmenopausal. She would like to go over labs. She is also concerned with weight loss.   She had gastric bypass .  She has undergone lysis of adhesions x 2 LS and also pancreatectomy with liver biopsy.   Has had surgery for SBO (small bowel obstruction).    Has lost 20 pounds starting in July, 9 months ago, about 5 pounds per month.    Will check thyroid and cbc.     Recently was diagnosed with colitis, camphylobacter 3/1/2021.   Was on antibiotics cipro x 5 days     Current contraception: menopause  History of abnormal Pap smear: No  Family history of uterine or ovarian cancer: No  History of abnormal mammogram: No  Family history of breast cancer: No    Concerns today:         Past Medical History:   Diagnosis Date     Benign paroxysmal positional vertigo     occ.      Calculus of kidney 05/2005    x1 on L side passed, several stones.  Has been tested for oxalate.     Chronic abdominal pain 2013     Chronic pain      Chronic pancreatitis 2013     Depression     also occ panic spells     Dyspepsia 1999    H. pylori   treated     Headaches     still periodic HA's ;  often 5X/week     Hypertension 2016    Stress related     Iron deficiency anemia secondary to inadequate dietary iron intake 2003    relates to gastric bypass     Post-pancreatectomy diabetes melltius 2013     Sleep apnea     uses splint     Spasm of sphincter of Oddi     surgical + endoscopic stenting of pancreatic duct @ [facility] 06     Thyroid nodule 2016     Vaccination not carried out           Family History   Problem Relation Age of Onset     Family History Negative Mother      Respiratory Father         COPD;  at 69     Genitourinary Problems Father         kidney stones     Substance Abuse Father      Depression Father       Asthma Father      Heart Disease Paternal Grandfather         M.I.     Coronary Artery Disease Paternal Grandfather      Hyperlipidemia Paternal Grandfather      Genitourinary Problems Brother         multiple brothers with kidney stones     Gastrointestinal Disease Maternal Grandmother         undiagnosed 'gut' issues     Coronary Artery Disease Maternal Grandfather      Hyperlipidemia Maternal Grandfather      Cerebrovascular Disease Paternal Grandmother         At the age of 103     Anxiety Disorder Paternal Grandmother      Osteoporosis Paternal Grandmother      Anxiety Disorder Son      Anxiety Disorder Daughter      Asthma Daughter        Past Surgical History:   Procedure Laterality Date     ABDOMINOPLASTY  2002    Tummy tuck     APPENDECTOMY  1990     BUNIONECTOMY Right 1998     CBD Stent placement  2002    CBD stent; Dr. Presley      SECTION       CHOLECYSTECTOMY       COLONOSCOPY   &     colonoscopy     COLONOSCOPY       COMBINED CYSTOSCOPY, RETROGRADES, URETEROSCOPY, LASER HOLMIUM LITHOTRIPSY URETER(S), INSERT STENT Right 2015    Procedure: COMBINED CYSTOSCOPY, RETROGRADES, URETEROSCOPY, LASER HOLMIUM LITHOTRIPSY URETER(S), INSERT STENT;  Surgeon: Kennedi Aldana MD;  [facility]     COMBINED CYSTOSCOPY, RETROGRADES, URETEROSCOPY, LASER HOLMIUM LITHOTRIPSY URETER(S), INSERT STENT Right 2015    Procedure: COMBINED CYSTOSCOPY, RETROGRADES, URETEROSCOPY, LASER HOLMIUM LITHOTRIPSY URETER(S), INSERT STENT;  Surgeon: Kennedi Aldana MD;  [facility] OR     CYSTECTOMY OVARIAN BENIGN Right      CYSTOSCOPY, RETROGRADES, INSERT STENT URETER(S), COMBINED  10/02/2012    Procedure: COMBINED CYSTOSCOPY, RETROGRADES, INSERT STENT URETER(S);  COMBINED CYSTOSCOPY,  , INSERT LEFT STENT URETER;  Surgeon: Johny Baez MD;  Location: [facility] OR     ESOPHAGOSCOPY, GASTROSCOPY, DUODENOSCOPY (EGD), COMBINED N/A 2018    Procedure: COMBINED  ESOPHAGOSCOPY, GASTROSCOPY, DUODENOSCOPY (EGD);  ESOPHAGOSCOPY, GASTROSCOPY, DUODENOSCOPY (EGD)    ;  Surgeon: Tamir Rodgers MD;  Location: [facility] GI     EXTRACORPOREAL SHOCK WAVE LITHOTRIPSY (ESWL)  10/16/2012    Procedure: EXTRACORPOREAL SHOCK WAVE LITHOTRIPSY (ESWL);  left EXTRACORPOREAL SHOCK WAVE LITHOTRIPSY (ESWL) ;  Surgeon: Johny Baez MD;  Location: [facility] OR     Gastric bypass NOS       HERNIA REPAIR  02/2015     IRRIGATION AND DEBRIDEMENT HAND, COMBINED Left 10/30/2020    Procedure: Left hand sharp excisional debridement of skin, subcutaneous tissue and fat with a scalpel, 2 x 1 x 1 cm.;  Surgeon: Demian Renteria MD;  Location: [facility] OR     LAP, LYSIS OF ADHESIONS       LAPAROSCOPIC LYSIS ADHESIONS N/A 02/20/2015    Procedure: LAPAROSCOPIC LYSIS ADHESIONS;  Surgeon: Aaron Early MD;  [facility]     LAPAROSCOPIC LYSIS ADHESIONS N/A 12/29/2015    Procedure: LAPAROSCOPIC LYSIS ADHESIONS;  Surgeon: Aaron Early MD;  [facility]     PANCREATECTOMY, TRANSPLANT AUTO ISLET CELL, COMBINED  05/10/2013    Procedure: COMBINED PANCREATECTOMY, TRANSPLANT AUTO ISLET CELL;  Pancreatectomy, Auto Islet Cell Transplant   hernia repair, jejunostomy tube and liver biopsies with Anesthesia General with block;  Surgeon: Aaron Early MD;  [facility]     Partial ileum resection  1992     REPAIR PTOSIS BROW BILATERAL Bilateral 06/09/2020    Procedure: BILATERAL BROW PTOSIS REPAIR;  Surgeon: Denise Alberts MD;  Location:  OR     Surgery for SBO  2015     TONSILLECTOMY, ADENOIDECTOMY, COMBINED  1997       Current Outpatient Medications   Medication     amylase-lipase-protease (VIOKACE) 82942 units TABS tablet     amylase-lipase-protease (VIOKACE) 46971 units TABS tablet     calcium carbonate 600 mg-vitamin D 400 units (CALTRATE) 600-400 MG-UNIT per tablet     Continuous Blood Gluc  (DEXCOM G6 ) MODESTA     Continuous Blood Gluc Sensor (DEXCOM G6 SENSOR) MISC     Continuous Blood  Gluc Transmit (DEXCOM G6 TRANSMITTER) MISC     DULoxetine (CYMBALTA) 20 MG capsule     escitalopram (LEXAPRO) 20 MG tablet     estradiol (ESTRACE) 0.1 MG/GM vaginal cream     famotidine (PEPCID) 40 MG tablet     gabapentin (NEURONTIN) 300 MG capsule     glucose 40 % GEL     hydrOXYzine (ATARAX) 25 MG tablet     insulin aspart (NOVOLOG PENFILL) 100 UNIT/ML cartridge     insulin glargine (LANTUS PEN) 100 UNIT/ML pen     lactobacillus rhamnosus, GG, (CULTURELL) capsule     levothyroxine (SYNTHROID/LEVOTHROID) 100 MCG tablet     loratadine (CLARITIN) 10 MG tablet     methylcellulose (CITRUCEL) 500 MG TABS tablet     modafinil (PROVIGIL) 200 MG tablet     omeprazole (PRILOSEC) 40 MG DR capsule     ondansetron (ZOFRAN-ODT) 8 MG ODT tab     traZODone (DESYREL) 50 MG tablet     UNABLE TO FIND     UNABLE TO FIND     UNABLE TO FIND     UNABLE TO FIND     No current facility-administered medications for this visit.      Allergies   Allergen Reactions     Corticosteroids Other (See Comments)     All oral, IV and injectable steroids are contraindicated (unless in life threatening situations) in Islet Auto transplant recipients. They can cause irreversible loss of islet cell function. Please contact patient's transplant care coordinator, Erlinda Multani RN BSN at [phone]/pager: [phone] and endocrinologist prior to administration.       Povidone Iodine Hives     Causes skin to blister     Naproxen      Other reaction(s): Abdominal Pain  Pt allergic to Naprosyn     Nsaids      naprosyn = GI upset     Povidone Iodine      blisters     Sulfasalazine Nausea and Nausea and Vomiting       Social History     Tobacco Use     Smoking status: Never Smoker     Smokeless tobacco: Never Used   Substance Use Topics     Alcohol use: No     Alcohol/week: 0.0 standard drinks     Frequency: Never     Drinks per session: Patient refused     Binge frequency: Never       Review Of Systems  Ears/Nose/Throat: negative  Respiratory: No  "shortness of breath, dyspnea on exertion, cough, or hemoptysis  Cardiovascular: negative  Gastrointestinal: negative  Genitourinary: negative  Constitutional, HEENT, cardiovascular, pulmonary, GI, , musculoskeletal, neuro, skin, endocrine and psych systems are negative, except as otherwise noted.    OBJECTIVE:  BP (!) 140/80   Ht 1.626 m (5' 4\")   Wt 54.1 kg (119 lb 4.8 oz)   LMP 2013   BMI 20.48 kg/m    General appearance: healthy, alert and no distress  Skin: Skin color, texture, turgor normal. No rashes or lesions.  Ears: negative  Nose/Sinuses: Nares normal. Septum midline. Mucosa normal. No drainage or sinus tenderness.  Oropharynx: Lips, mucosa, and tongue normal. Teeth and gums normal.  Neck: Neck supple. No adenopathy. Thyroid symmetric, normal size,, Carotids without bruits.  Lungs: negative, Percussion normal. Good diaphragmatic excursion. Lungs clear  Heart: negative, PMI normal. No lifts, heaves, or thrills. RRR. No murmurs, clicks gallops or rub  Breasts: deferred  Abdomen: Abdomen soft, non-tender. BS normal. No masses, organomegaly  Pelvic: Pelvic:  Pelvic examination with no pap/no Gonorrhea and Chlamydia   including  External genitalia normal   and vagina normal rugatted slightly atrophic  Examination of urethra  normal no masses, tenderness, scarring  bladder, no masses or tenderness  Cervix no lesions or discharge  Bimanual exam with   Uterus 6 weeks size, mid position, mobile,no-tenderness, no descent   Adnexa/parametria  Normal no masses   Anus normal, no prolapse seen       ASSESSMENT:  Lynn Thompson is an 56 year old   woman who presents for   gynecology consult for rectal prolapse.     PLAN:  Dx:  1)  Rectal prolapse:  No uterine prolapse associated with symptoms on this exam.   Could not reproduce rectal prolapse, but states rectum protrudes 4 cm with bowel movement.    2)  Unintentional weight loss: tsh and cbc today occurring for 9 months   3)  Hypothyroid: recheck " tsh   4)  Anemia:  Getting colonoscopy 3/31.  Has been anemic whole life.   IV iron infusion ordered. covid test ordered.     Labs were reviewed in [facility]   Imaging was reviewed in Epic   Tests and documents were reviewed.   Discussion of management or test interpretation        Dr. Kavitha Spencer, DO    Obstetrics and Gynecology  [facility] [place] [facility] [place]

## 2021-03-15 NOTE — NURSING NOTE
"Chief Complaint   Patient presents with     Consult     rectal prolapse--look at labs--keeps losing weight       Initial BP (!) 140/80   Ht 1.626 m (5' 4\")   Wt 54.1 kg (119 lb 4.8 oz)   LMP 2013   BMI 20.48 kg/m   Estimated body mass index is 20.48 kg/m  as calculated from the following:    Height as of this encounter: 1.626 m (5' 4\").    Weight as of this encounter: 54.1 kg (119 lb 4.8 oz).  BP completed using cuff size: regular    Questioned patient about current smoking habits.  Pt. has never smoked.          The following HM Due: NONE           " Cosentyx Pregnancy And Lactation Text: This medication is Pregnancy Category B and is considered safe during pregnancy. It is unknown if this medication is excreted in breast milk.

## 2021-03-15 NOTE — TELEPHONE ENCOUNTER
"[facility] Emergency Department/Urgent Care Lab result notification     Patient/parent Name  Lynn    RN Assessment (Patient s current Symptoms), include time called.  [Insert Left message here if message left]  10:59AM: Patient returned call from letter received to call back. States she is feeling fine. Had been notified by the MD about her lab result. \"The puppy that I was fostering had it.\"   Lab result (if applicable):  Final Enteric Bacteria and Virus Panel by [facility] Stool is POSITIVE for Campylobacter group  Patient to be notified, symptom's assessed and advised per [place] ED lab result protocol.  RN Recommendations/Instructions per [place] ED lab result protocol  Patient notified of lab result and treatment recommendations.    The patient has no further questions or concerns. Is feeling well. Was given information about campylobacter from the MD.     Please Contact your PCP clinic or return to the Emergency department if your:    Symptoms return.    PCP follow-up Questions asked: YES       [RN Name]  Allison Lundberg RN  [facility] RN  Lung Nodule and ED Lab Result RN  Epic pool (ED late result f/u RN): [id number]  [facility] INCIDENTAL RADIOLOGY F/U NURSES: P [id number]  # [phone]      "

## 2021-03-15 NOTE — PATIENT INSTRUCTIONS
Check labs     Start infusion [phone] call to set up     Referral to dr james Spencer,     Obstetrics and Gynecology  [facility] and [place]

## 2021-03-16 DIAGNOSIS — E13.9 POST-PANCREATECTOMY DIABETES (H): Primary | ICD-10-CM

## 2021-03-16 DIAGNOSIS — E89.1 POST-PANCREATECTOMY DIABETES (H): Primary | ICD-10-CM

## 2021-03-16 DIAGNOSIS — Z90.410 POST-PANCREATECTOMY DIABETES (H): Primary | ICD-10-CM

## 2021-03-16 LAB — TSH SERPL DL<=0.005 MIU/L-ACNC: 2.93 MU/L (ref 0.4–4)

## 2021-03-16 NOTE — TELEPHONE ENCOUNTER
Oncology/Surgical Oncology Referral Request:     Specialty Requested: Medical Oncology     Referring Provider: Kavitha Spencer DO     Referring Clinic/Organization: [facility]    Records location: [facility]     Requested Provider (if specified): Not Specified

## 2021-03-16 NOTE — TELEPHONE ENCOUNTER
RECORDS STATUS - ALL OTHER DIAGNOSIS      RECORDS RECEIVED FROM: [facility]   DATE RECEIVED: 5/6/2021   NOTES STATUS DETAILS   OFFICE NOTE from referring provider Kavitha Quinonez DO   OFFICE NOTE from medical oncologist N/A    DISCHARGE SUMMARY from hospital Pre-Admit [facility]- 3/31/2021 Gastro (Pre-Admit)    DISCHARGE REPORT from the ER     OPERATIVE REPORT N/A 1/24/2018 Upper GI Endoscopy    MEDICATION LIST Complete [facility]   CLINICAL TRIAL TREATMENTS TO DATE     LABS     PATHOLOGY REPORTS     ANYTHING RELATED TO DIAGNOSIS Complete Labs last updated on 3/16/2021   GENONOMIC TESTING     TYPE:     IMAGING (NEED IMAGES & REPORT)     CT SCANS Complete CT Abdomen Pelvis 3/1/2021   MRI     MAMMO     ULTRASOUND     PET     '

## 2021-03-28 ENCOUNTER — HOSPITAL ENCOUNTER (OUTPATIENT)
Dept: LAB | Facility: CLINIC | Age: 56
Discharge: HOME | End: 2021-03-28
Attending: INTERNAL MEDICINE | Admitting: INTERNAL MEDICINE
Payer: MEDICARE

## 2021-03-28 DIAGNOSIS — Z11.59 ENCOUNTER FOR SCREENING FOR OTHER VIRAL DISEASES: ICD-10-CM

## 2021-03-28 LAB
SARS-COV-2 RNA RESP QL NAA+PROBE: NORMAL
SPECIMEN SOURCE: NORMAL

## 2021-03-28 PROCEDURE — U0003 INFECTIOUS AGENT DETECTION BY NUCLEIC ACID (DNA OR RNA); SEVERE ACUTE RESPIRATORY SYNDROME CORONAVIRUS 2 (SARS-COV-2) (CORONAVIRUS DISEASE [COVID-19]), AMPLIFIED PROBE TECHNIQUE, MAKING USE OF HIGH THROUGHPUT TECHNOLOGIES AS DESCRIBED BY CMS-2020-01-R: HCPCS | Performed by: INTERNAL MEDICINE

## 2021-03-28 PROCEDURE — U0005 INFEC AGEN DETEC AMPLI PROBE: HCPCS | Performed by: INTERNAL MEDICINE

## 2021-03-29 LAB
LABORATORY COMMENT REPORT: NORMAL
SARS-COV-2 RNA RESP QL NAA+PROBE: NEGATIVE
SPECIMEN SOURCE: NORMAL

## 2021-03-31 ENCOUNTER — HOSPITAL ENCOUNTER (OUTPATIENT)
Facility: CLINIC | Age: 56
Discharge: HOME | End: 2021-03-31
Attending: INTERNAL MEDICINE | Admitting: INTERNAL MEDICINE
Payer: MEDICARE

## 2021-03-31 VITALS
SYSTOLIC BLOOD PRESSURE: 125 MMHG | WEIGHT: 120 LBS | DIASTOLIC BLOOD PRESSURE: 66 MMHG | BODY MASS INDEX: 20.49 KG/M2 | OXYGEN SATURATION: 98 % | HEIGHT: 64 IN | RESPIRATION RATE: 16 BRPM | HEART RATE: 59 BPM | TEMPERATURE: 98.6 F

## 2021-03-31 LAB
COLONOSCOPY: NORMAL
GLUCOSE BLDC GLUCOMTR-MCNC: 131 MG/DL (ref 70–99)

## 2021-03-31 PROCEDURE — 45378 DIAGNOSTIC COLONOSCOPY: CPT | Performed by: INTERNAL MEDICINE

## 2021-03-31 PROCEDURE — G0121 COLON CA SCRN NOT HI RSK IND: HCPCS | Mod: 74 | Performed by: INTERNAL MEDICINE

## 2021-03-31 PROCEDURE — 250N000011 HC RX IP 250 OP 636: Performed by: INTERNAL MEDICINE

## 2021-03-31 PROCEDURE — 82962 GLUCOSE BLOOD TEST: CPT

## 2021-03-31 PROCEDURE — G0500 MOD SEDAT ENDO SERVICE >5YRS: HCPCS | Performed by: INTERNAL MEDICINE

## 2021-03-31 RX ORDER — ONDANSETRON 2 MG/ML
4 INJECTION INTRAMUSCULAR; INTRAVENOUS EVERY 6 HOURS PRN
Status: DISCONTINUED | OUTPATIENT
Start: 2021-03-31 | End: 2021-03-31 | Stop reason: HOSPADM

## 2021-03-31 RX ORDER — NALOXONE HYDROCHLORIDE 0.4 MG/ML
0.4 INJECTION, SOLUTION INTRAMUSCULAR; INTRAVENOUS; SUBCUTANEOUS
Status: DISCONTINUED | OUTPATIENT
Start: 2021-03-31 | End: 2021-03-31 | Stop reason: HOSPADM

## 2021-03-31 RX ORDER — NALOXONE HYDROCHLORIDE 0.4 MG/ML
0.2 INJECTION, SOLUTION INTRAMUSCULAR; INTRAVENOUS; SUBCUTANEOUS
Status: DISCONTINUED | OUTPATIENT
Start: 2021-03-31 | End: 2021-03-31 | Stop reason: HOSPADM

## 2021-03-31 RX ORDER — ONDANSETRON 4 MG/1
4 TABLET, ORALLY DISINTEGRATING ORAL EVERY 6 HOURS PRN
Status: DISCONTINUED | OUTPATIENT
Start: 2021-03-31 | End: 2021-03-31 | Stop reason: HOSPADM

## 2021-03-31 RX ORDER — ONDANSETRON 2 MG/ML
4 INJECTION INTRAMUSCULAR; INTRAVENOUS
Status: DISCONTINUED | OUTPATIENT
Start: 2021-03-31 | End: 2021-03-31 | Stop reason: HOSPADM

## 2021-03-31 RX ORDER — FLUMAZENIL 0.1 MG/ML
0.2 INJECTION, SOLUTION INTRAVENOUS
Status: DISCONTINUED | OUTPATIENT
Start: 2021-03-31 | End: 2021-03-31 | Stop reason: HOSPADM

## 2021-03-31 RX ORDER — PROCHLORPERAZINE MALEATE 10 MG
10 TABLET ORAL EVERY 6 HOURS PRN
Status: DISCONTINUED | OUTPATIENT
Start: 2021-03-31 | End: 2021-03-31 | Stop reason: HOSPADM

## 2021-03-31 RX ORDER — FENTANYL CITRATE 50 UG/ML
INJECTION, SOLUTION INTRAMUSCULAR; INTRAVENOUS PRN
Status: COMPLETED | OUTPATIENT
Start: 2021-03-31 | End: 2021-03-31

## 2021-03-31 RX ORDER — LIDOCAINE 40 MG/G
CREAM TOPICAL
Status: DISCONTINUED | OUTPATIENT
Start: 2021-03-31 | End: 2021-03-31 | Stop reason: HOSPADM

## 2021-03-31 RX ADMIN — MIDAZOLAM 2 MG: 1 INJECTION INTRAMUSCULAR; INTRAVENOUS at 11:53

## 2021-03-31 RX ADMIN — FENTANYL CITRATE 100 MCG: 50 INJECTION, SOLUTION INTRAMUSCULAR; INTRAVENOUS at 11:53

## 2021-03-31 ASSESSMENT — MIFFLIN-ST. JEOR: SCORE: 1109.32

## 2021-03-31 NOTE — LETTER
March 9, 2021      Lynn Thompson  [address] [facility] [id number]        Dear Lynn,       Please be aware that coverage of these services is subject to the terms and limitations of your health insurance plan.  Call member services at your health plan with any benefit or coverage questions.    Thank you for choosing [facility]. You are scheduled for the following service(s):    Date:  3/31/2021             Procedure:  COLONOSCOPY  Doctor:        Dr. Ihsan Saenz   Arrival Time:  10:30  *Enter and check in at the Main Hospital Entrance*  Procedure Time:  11:00      Location:   [facility]        Endoscopy Department, First Floor         [address] [place], [place] [address]      [phone] or [phone] ([facility]) to reschedule          MIRALAX -GATORADE  PREP  Colonoscopy is the most accurate test to detect colon polyps and colon cancer; and the only test where polyps can be removed. During this procedure, a doctor examines the lining of your large intestine and rectum through a flexible tube.   Transportation  You must arrange for a ride for the day of your procedure with a responsible adult. A taxi , Uber, etc, is not an option unless you are accompanied by a responsible adult. If you fail to arrange transportation with a responsible adult, your procedure will be cancelled and rescheduled.    Purchase the  following supplies at your local pharmacy:  - 2 (two) bisacodyl tablets: each tablet contains 5 mg.  (Dulcolax  laxative NOT Dulcolax  stool softener)   - 1 (one) 8.3 oz bottle of Polyethylene Glycol (PEG) 3350 Powder   (MiraLAX , Smooth LAX , ClearLAX  or equivalent)  - 64 oz Gatorade    Regular Gatorade, Gatorade G2 , Powerade , Powerade Zero  or Pedialyte  is acceptable. Red colored flavors are not allowed; all other colors (yellow, green, orange, purple and blue) are okay. It is also okay to buy two 2.12 oz packets of powdered  Gatorade that can be mixed with water to a total volume of 64 oz of liquid.  - 1 (one) 10 oz bottle of Magnesium Citrate (Red colored flavors are not allowed)  It is also okay for you to use a 0.5 oz package of powdered magnesium citrate (17 g) mixed with 10 oz of water.      PREPARATION FOR COLONOSCOPY    7 days before:    Discontinue fiber supplements and medications containing iron. This includes Metamucil  and Fibercon ; and multivitamins with iron.    3 days before:    Begin a low-fiber diet. A low-fiber diet helps making the cleanout more effective.     Examples of a low-fiber diet include (but are not limited to): white bread, white rice, pasta, crackers, fish, chicken, eggs, ground beef, creamy peanut butter, cooked/steamed/boiled vegetables, canned fruit, bananas, melons, milk, plain yogurt cheese, salad dressing and other condiments.     The following are not allowed on a low-fiber diet: seeds, nuts, popcorn, bran, whole wheat, corn, quinoa, raw fruits and vegetables, berries and dried fruit, beans and lentils.    For additional details on low-fiber diet, please refer to the table on the last page.    2 days before:    Continue the low-fiber diet.     Drink at least 8 glasses of water throughout the day.     Stop eating solid foods at 11:45 pm.    1 day before:    In the morning: begin a clear liquid diet (liquids you can see through).     Examples of a clear liquid diet include: water, clear broth or bouillon, Gatorade, Pedialyte or Powerade, carbonated and non-carbonated soft drinks (Sprite , 7-Up , ginger ale), strained fruit juices without pulp (apple, white grape, white cranberry), Jell-O  and popsicles.     The following are not allowed on a clear liquid diet: red liquids, alcoholic beverages, dairy products (milk, creamer, and yogurt), protein shakes, creamy broths, juice with pulp and chewing tobacco.    At noon: take 2 (two) bisacodyl tablets     At 4 (and no later than 6pm): start drinking the  Miralax-Gatorade preparation (8.3 oz of Miralax mixed with 64 oz of Gatorade in a large pitcher). Drink 1(one) 8 oz glass every 15 minutes thereafter, until the mixture is gone.    COLON CLEANSING TIPS: drink adequate amounts of fluids before and after your colon cleansing to prevent dehydration. Stay near a toilet because you will have diarrhea. Even if you are sitting on the toilet, continue to drink the cleansing solution every 15 minutes. If you feel nauseous or vomit, rinse your mouth with water, take a 15 to 30-minute-break and then continue drinking the solution. You will be uncomfortable until the stool has flushed from your colon (in about 2 to 4 hours). You may feel chilled.    Day of your procedure  You may take all of your morning medications including blood pressure medications, blood thinners (if you have not been instructed to stop these by our office), methadone, anti-seizure medications with sips of water 3 hours prior to your procedure or earlier. Do not take insulin or vitamins prior to your procedure. Continue the clear liquid diet.       4 hours prior: drink 10 oz of magnesium citrate. It may be easier to drink it with a straw.    STOP consuming all liquids after that.     Do not take anything by mouth during this time.     Allow extra time to travel to your procedure as you may need to stop and use a restroom along the way.    You are ready for the procedure, if you followed all instructions and your stool is no longer formed, but clear or yellow liquid. If you are unsure whether your colon is clean, please call our office at [phone] before you leave for your appointment.    Bring the following to your procedure:  - Insurance Card/Photo ID.   - List of current medications including over-the-counter medications and supplements.   - Your rescue inhaler if you currently use one to control asthma.    Canceling or rescheduling your appointment:   If you must cancel or reschedule your  appointment, please call [phone] as soon as possible.      COLONOSCOPY PRE-PROCEDURE CHECKLIST    If you have diabetes, ask your regular doctor for diet and medication restrictions.  If you take an anticoagulant or anti-platelet medication (such as Coumadin , Lovenox , Pradaxa , Xarelto , Eliquis , etc.), please call your primary doctor for advice on holding this medication.  If you take aspirin you may continue to do so.  If you are or may be pregnant, please discuss the risks and benefits of this procedure with your doctor.        What happens during a colonoscopy?    Plan to spend up to two hours, starting at registration time, at the endoscopy center the day of your procedure. The colonoscopy takes an average of 15 to 30 minutes. Recovery time is about 30 minutes.      Before the exam:    You will change into a gown.    Your medical history and medication list will be reviewed with you, unless that has been done over the phone prior to the procedure.     A nurse will insert an intravenous (IV) line into your hand or arm.    The doctor will meet with you and will give you a consent form to sign.  During the exam:     Medicine will be given through the IV line to help you relax.     Your heart rate and oxygen levels will be monitored. If your blood pressure is low, you may be given fluids through the IV line.     The doctor will insert a flexible hollow tube, called a colonoscope, into your rectum. The scope will be advanced slowly through the large intestine (colon).    You may have a feeling of fullness or pressure.     If an abnormal tissue or a polyp is found, the doctor may remove it through the endoscope for closer examination, or biopsy. Tissue removal is painless    After the exam:           Any tissue samples removed during the exam will be sent to a lab for evaluation. It may take 5-7 working days for you to be notified of the results.     A nurse will provide you with complete discharge  instructions before you leave the endoscopy center. Be sure to ask the nurse for specific instructions if you take blood thinners such as Aspirin, Coumadin or Plavix.     The doctor will prepare a full report for you and for the physician who referred you for the procedure.     Your doctor will talk with you about the initial results of your exam.      Medication given during the exam will prohibit you from driving for the rest of the day.     Following the exam, you may resume your normal diet. Your first meal should be light, no greasy foods. Avoid alcohol until the next day.     You may resume your regular activities the day after the procedure.         LOW-FIBER DIET    Foods RECOMMENDED Foods to AVOID   Breads, Cereal, Rice and Pasta:   White bread, rolls, biscuits, croissant and andrea toast.   Waffles, Romansh toast and pancakes.   White rice, noodles, pasta, macaroni and peeled cooked potatoes.   Plain crackers and saltines.   Cooked cereals: farina, cream of rice.   Cold cereals: Puffed Rice , Rice Krispies , Corn Flakes  and Special K    Breads, Cereal, Rice and Pasta:   Breads or rolls with nuts, seeds or fruit.   Whole wheat, pumpernickel, rye breads and cornbread.   Potatoes with skin, brown or wild rice, and kasha (buckwheat).     Vegetables:   Tender cooked and canned vegetables without seeds: carrots, asparagus tips, green or wax beans, pumpkin, spinach, lima beans. Vegetables:   Raw or steamed vegetables.   Vegetables with seeds.   Sauerkraut.   Winter squash, peas, broccoli, Brussel sprouts, cabbage, onions, cauliflower, baked beans, peas and corn.   Fruits:   Strained fruit juice.   Canned fruit, except pineapple.   Ripe bananas and melon. Fruits:   Prunes and prune juice.   Raw fruits.   Dried fruits: figs, dates and raisins.   Milk/Dairy:   Milk: plain or flavored.   Yogurt, custard and ice cream.   Cheese and cottage cheese Milk/Dairy:     Meat and other proteins:   ground, well-cooked tender  beef, lamb, ham, veal, pork, fish, poultry and organ meats.   Eggs.   Peanut butter without nuts. Meat and other proteins:   Tough, fibrous meats with gristle.   Dry beans, peas and lentils.   Peanut butter with nuts.   Tofu.   Fats, Snack, Sweets, Condiments and Beverages:   Margarine, butter, oils, mayonnaise, sour cream and salad dressing, plain gravy.   Sugar, hard candy, clear jelly, honey and syrup.   Spices, cooked herbs, bouillon, broth and soups made with allowed vegetable, ketchup and mustard.   Coffee, tea and carbonated drinks.   Plain cakes, cookies and pretzels.   Gelatin, plain puddings, custard, ice cream, sherbet and popsicles. Fats, Snack, Sweets, Condiments and Beverages:   Nuts, seeds and coconut.   Jam, marmalade and preserves.   Pickles, olives, relish and horseradish.   All desserts containing nuts, seeds, dried fruit and coconut; or made from whole grains or bran.   Candy made with nuts or seeds.   Popcorn.         DIRECTIONS TO THE ENDOSCOPY DEPARTMENT    From the north ([facility])  Take [address] South, exit on [address] [address]. Get into the left hand sebastian, turn left (east), go one-half mile to [address] and turn left. Go north to the second stoplight, take a right on [address] and follow it to the Main Hospital entrance.    From the south ([facility])  Take 35N to the 35E split and exit on [address] [address]. On [address] [address], turn left (west) to [address]. Turn right (north) on [address]. Go north to the second stoplight, take a right on [address] and follow it to the Main Hospital entrance.    From the east via 35E ([facility])  Take 35E south to [address] [address] exit. Turn right on [address] [address]. Go west to [address]. Turn right (north) on [address]. Go to the second stoplight, take a right on [address] to the Main Hospital entrance.    From the east via [address] ([facility])  Take [facility] [address]  West to [address]. Turn left (south) on [address] to [address], turn left (east) on [address] and follow it to the Main Hospital entrance.    From the west via [address] ([place], [place])  Take [address] east to [address]. Turn right (south) on [address] to [address], turn left (east) on [address] and follow it to the Main Hospital entrance.

## 2021-03-31 NOTE — H&P
Pre-Endoscopy History and Physical     Lynn Thompson MRN# [id number]   YOB: 1965 Age: 56 year old     Date of Procedure: 3/31/2021  Primary care provider: Maryana Brooks  Type of Endoscopy: colonoscopy  Reason for Procedure: screening  Type of Anesthesia Anticipated: Conscious Sedation    HPI:    Lynn is a 56 year old female who will be undergoing the above procedure.      A history and physical has been performed. The patient's medications and allergies have been reviewed. The risks and benefits of the procedure and the sedation options and risks were discussed with the patient.  All questions were answered and informed consent was obtained.      She denies a personal or family history of anesthesia complications or bleeding disorders.     Patient Active Problem List   Diagnosis     Iron deficiency anemia secondary to inadequate dietary iron intake     Gastric bypass status for obesity     Health Care Home     Chronic pain syndrome     Exocrine pancreatic insufficiency     Asplenia     BLU (obstructive sleep apnea)     S/P exploratory laparotomy     Dysthymia     Anxiety     Thyroid nodule     Acquired hypothyroidism     Post-pancreatectomy diabetes (H)     E coli bacteremia     Antibiotic-associated diarrhea     Elevated LFTs     Fever     Dog bite, initial encounter     Cellulitis, unspecified cellulitis site     Other iron deficiency anemia        Past Medical History:   Diagnosis Date     Benign paroxysmal positional vertigo     occ.      Calculus of kidney 05/2005    x1 on L side passed, several stones.  Has been tested for oxalate.     Chronic abdominal pain 07/17/2013     Chronic pain      Chronic pancreatitis 07/17/2013     Depression     also occ panic spells     Depressive disorder      Dyspepsia 06/1999    H. pylori   treated     Headaches     still periodic HA's ;  often 5X/week     Hypertension 02/22/2016    Stress related     Iron deficiency anemia secondary to inadequate  dietary iron intake 2003    relates to gastric bypass     Post-pancreatectomy diabetes melltius 2013     Sleep apnea     uses splint     Spasm of sphincter of Oddi     surgical + endoscopic stenting of pancreatic duct @ [facility] 06     Thyroid nodule 2016     Vaccination not carried out         Past Surgical History:   Procedure Laterality Date     ABDOMINOPLASTY  2002    Tummy tuck     APPENDECTOMY  1990     BUNIONECTOMY Right 1998     CBD Stent placement  2002    CBD stent; Dr. Presley      SECTION       CHOLECYSTECTOMY       COLONOSCOPY   &     colonoscopy     COLONOSCOPY       COMBINED CYSTOSCOPY, RETROGRADES, URETEROSCOPY, LASER HOLMIUM LITHOTRIPSY URETER(S), INSERT STENT Right 2015    Procedure: COMBINED CYSTOSCOPY, RETROGRADES, URETEROSCOPY, LASER HOLMIUM LITHOTRIPSY URETER(S), INSERT STENT;  Surgeon: Kennedi Aldana MD;  Location: [facility] OR     COMBINED CYSTOSCOPY, RETROGRADES, URETEROSCOPY, LASER HOLMIUM LITHOTRIPSY URETER(S), INSERT STENT Right 2015    Procedure: COMBINED CYSTOSCOPY, RETROGRADES, URETEROSCOPY, LASER HOLMIUM LITHOTRIPSY URETER(S), INSERT STENT;  Surgeon: Kennedi Aldana MD;  [facility]: [facility] OR     CYSTECTOMY OVARIAN BENIGN Right      CYSTOSCOPY, RETROGRADES, INSERT STENT URETER(S), COMBINED  10/02/2012    Procedure: COMBINED CYSTOSCOPY, RETROGRADES, INSERT STENT URETER(S);  COMBINED CYSTOSCOPY,  , INSERT LEFT STENT URETER;  Surgeon: Johny Baez MD;  Location: [facility] OR     ESOPHAGOSCOPY, GASTROSCOPY, DUODENOSCOPY (EGD), COMBINED N/A 2018    Procedure: COMBINED ESOPHAGOSCOPY, GASTROSCOPY, DUODENOSCOPY (EGD);  ESOPHAGOSCOPY, GASTROSCOPY, DUODENOSCOPY (EGD)    ;  Surgeon: Tamir Rodgers MD;  Location: [facility] GI     EXTRACORPOREAL SHOCK WAVE LITHOTRIPSY (ESWL)  10/16/2012    Procedure: EXTRACORPOREAL SHOCK WAVE LITHOTRIPSY (ESWL);  left EXTRACORPOREAL SHOCK WAVE LITHOTRIPSY (ESWL) ;  Surgeon: Johny Baez MD;   Location: [facility] OR     Gastric bypass NOS       HERNIA REPAIR  02/2015     IRRIGATION AND DEBRIDEMENT HAND, COMBINED Left 10/30/2020    Procedure: Left hand sharp excisional debridement of skin, subcutaneous tissue and fat with a scalpel, 2 x 1 x 1 cm.;  Surgeon: Demian Renteria MD;  Location: [facility] OR     LAP, LYSIS OF ADHESIONS       LAPAROSCOPIC LYSIS ADHESIONS N/A 02/20/2015    Procedure: LAPAROSCOPIC LYSIS ADHESIONS;  Surgeon: Aaron Early MD;  [facility]     LAPAROSCOPIC LYSIS ADHESIONS N/A 12/29/2015    Procedure: LAPAROSCOPIC LYSIS ADHESIONS;  Surgeon: Aaron Early MD;  [facility]     PANCREATECTOMY, TRANSPLANT AUTO ISLET CELL, COMBINED  05/10/2013    Procedure: COMBINED PANCREATECTOMY, TRANSPLANT AUTO ISLET CELL;  Pancreatectomy, Auto Islet Cell Transplant   hernia repair, jejunostomy tube and liver biopsies with Anesthesia General with block;  Surgeon: Aaron Early MD;  [facility] OR     Partial ileum resection  1992     REPAIR PTOSIS BROW BILATERAL Bilateral 06/09/2020    Procedure: BILATERAL BROW PTOSIS REPAIR;  Surgeon: Denise Alberts MD;  Location:  OR     Surgery for SBO  2015     TONSILLECTOMY, ADENOIDECTOMY, COMBINED  1997       Relevant Family History: NONE    Relevant Social History: NONE     Prior to Admission medications    Medication Sig Start Date End Date Taking? Authorizing Provider   amylase-lipase-protease (VIOKACE) 04500 units TABS tablet Take 4-5 with meals and 2 with snacks 8/19/20  Yes Adriana Robles MD   calcium carbonate 600 mg-vitamin D 400 units (CALTRATE) 600-400 MG-UNIT per tablet Take 1 tablet by mouth daily   Yes Unknown, Entered By History   DULoxetine (CYMBALTA) 20 MG capsule Take 20 mg by mouth daily   Yes Unknown, Entered By History   escitalopram (LEXAPRO) 20 MG tablet Take 20 mg by mouth daily   Yes Unknown, Entered By History   famotidine (PEPCID) 40 MG tablet Take 1 tablet (40 mg) by mouth 2 times daily as needed for heartburn 5/20/20   Yes Maryana Brooks MD   gabapentin (NEURONTIN) 300 MG capsule Take 300 mg by mouth nightly as needed   Yes Unknown, Entered By History   insulin glargine (LANTUS PEN) 100 UNIT/ML pen Inject 6 units daily 11/23/20  Yes Adriana Robles MD   levothyroxine (SYNTHROID/LEVOTHROID) 100 MCG tablet Take 1 tablet (100 mcg) by mouth daily. SCHEDULE APPOINTMENT FOR FURTHER REFILLS  12/30/20  Yes Ravinder Kendall MD   loratadine (CLARITIN) 10 MG tablet Take 10 mg by mouth daily as needed    Yes Unknown, Entered By History   modafinil (PROVIGIL) 200 MG tablet Take 400 mg by mouth daily   Yes Unknown, Entered By History   omeprazole (PRILOSEC) 40 MG DR capsule Take 1 capsule (40 mg) by mouth 2 times daily Take 30-60 minutes before a meal. 8/3/20  Yes Maryana Brooks MD   amylase-lipase-protease (VIOKACE) 03266 units TABS tablet Take 2 capsules by mouth Take with snacks or supplements    Unknown, Entered By History   Continuous Blood Gluc  (DEXCOM G6 ) MODESTA Use as directed to check blood glucose levels 8/25/20   Adriana Robles MD   Continuous Blood Gluc Sensor (DEXCOM G6 SENSOR) MISC Change every 10 days 8/25/20   Adriana Robles MD   Continuous Blood Gluc Transmit (DEXCOM G6 TRANSMITTER) MISC Change every 3 months 8/25/20   Adriana Robles MD   estradiol (ESTRACE) 0.1 MG/GM vaginal cream PLACE 2 GRAMS VAGINALLY TWICE A WEEK 4/12/20   Kavitha Spencer DO   glucose 40 % GEL Take 15-30 g by mouth every 15 minutes as needed. 5/17/13   Suzi Short APRN CNP   hydrOXYzine (ATARAX) 25 MG tablet TAKE 1-2 TABLETS BY MOUTH 2 TIMES DAILY AS NEEDED FOR ANXIETY 7/25/19   Reported, Patient   insulin aspart (NOVOLOG PENFILL) 100 UNIT/ML cartridge Inject 0.5-2 units subcutaneously 4 times daily with meals and at bedtime 12/30/20   Adriana Robles MD   lactobacillus rhamnosus, GG, (CULTURELL) capsule Take 1 capsule by mouth daily    Unknown, Entered By History   methylcellulose (CITRUCEL) 500  "MG TABS tablet Take 500 mg by mouth daily    Unknown, Entered By History   ondansetron (ZOFRAN-ODT) 8 MG ODT tab Take 8 mg by mouth 3 times daily as needed for nausea    Unknown, Entered By History   STATIN NOT PRESCRIBED (INTENTIONAL) Please choose reason not prescribed from choices below. 3/16/21   Maryana Brooks MD   traZODone (DESYREL) 50 MG tablet Take 50 mg by mouth nightly as needed for sleep    Unknown, Entered By History   UNABLE TO FIND MEDICATION NAME: Dopatone Active    Reported, Patient   UNABLE TO FIND MEDICATION NAME: magnezyme    Reported, Patient   UNABLE TO FIND MEDICATION NAME: immunoG PRP    Reported, Patient   UNABLE TO FIND MEDICATION NAME: GI-Synergy    Reported, Patient       Allergies   Allergen Reactions     Corticosteroids Other (See Comments)     All oral, IV and injectable steroids are contraindicated (unless in life threatening situations) in Islet Auto transplant recipients. They can cause irreversible loss of islet cell function. Please contact patient's transplant care coordinator, Erlinda Multani RN BSN at [phone]/pager: [phone] and endocrinologist prior to administration.       Povidone Iodine Hives     Causes skin to blister     Naproxen      Other reaction(s): Abdominal Pain  Pt allergic to Naprosyn     Nsaids      naprosyn = GI upset     Povidone Iodine      blisters     Sulfasalazine Nausea and Nausea and Vomiting        REVIEW OF SYSTEMS:   A relevant review of systems was performed and was negative    PHYSICAL EXAM:   /69   Pulse 72   Temp 98.6  F (37  C) (Temporal)   Resp 16   Ht 1.626 m (5' 4\")   Wt 54.4 kg (120 lb)   LMP 12/19/2013   SpO2 99%   BMI 20.60 kg/m   Estimated body mass index is 20.6 kg/m  as calculated from the following:    Height as of this encounter: 1.626 m (5' 4\").    Weight as of this encounter: 54.4 kg (120 lb).   GENERAL APPEARANCE: alert, and oriented  MENTAL STATUS: alert  AIRWAY EXAM: Normal  RESP: lungs clear to " auscultation - no rales, rhonchi or wheezes  CV: regular rates and rhythm  DIAGNOSTICS:    Not indicated    IMPRESSION   ASA Class 2 - Mild systemic disease    PLAN:   Plan for colonoscopy. We discussed the risks, benefits and alternatives and the patient wished to proceed.      Signed Electronically by: Ihsan Saenz MD  March 31, 2021

## 2021-04-01 ENCOUNTER — HOSPITAL ENCOUNTER (OUTPATIENT)
Facility: CLINIC | Age: 56
Discharge: HOME | End: 2021-04-01
Attending: INTERNAL MEDICINE | Admitting: INTERNAL MEDICINE
Payer: MEDICARE

## 2021-04-01 VITALS
RESPIRATION RATE: 12 BRPM | BODY MASS INDEX: 20.49 KG/M2 | DIASTOLIC BLOOD PRESSURE: 59 MMHG | WEIGHT: 120 LBS | HEART RATE: 51 BPM | OXYGEN SATURATION: 98 % | HEIGHT: 64 IN | SYSTOLIC BLOOD PRESSURE: 111 MMHG

## 2021-04-01 LAB
COLONOSCOPY: NORMAL
GLUCOSE BLDC GLUCOMTR-MCNC: 116 MG/DL (ref 70–99)

## 2021-04-01 PROCEDURE — 258N000003 HC RX IP 258 OP 636: Performed by: INTERNAL MEDICINE

## 2021-04-01 PROCEDURE — 88305 TISSUE EXAM BY PATHOLOGIST: CPT | Mod: TC | Performed by: INTERNAL MEDICINE

## 2021-04-01 PROCEDURE — 88305 TISSUE EXAM BY PATHOLOGIST: CPT | Mod: 26 | Performed by: PATHOLOGY

## 2021-04-01 PROCEDURE — 99153 MOD SED SAME PHYS/QHP EA: CPT | Performed by: INTERNAL MEDICINE

## 2021-04-01 PROCEDURE — 82962 GLUCOSE BLOOD TEST: CPT

## 2021-04-01 PROCEDURE — 250N000011 HC RX IP 250 OP 636: Performed by: INTERNAL MEDICINE

## 2021-04-01 PROCEDURE — G0500 MOD SEDAT ENDO SERVICE >5YRS: HCPCS | Performed by: INTERNAL MEDICINE

## 2021-04-01 PROCEDURE — 45385 COLONOSCOPY W/LESION REMOVAL: CPT | Mod: PT | Performed by: INTERNAL MEDICINE

## 2021-04-01 RX ORDER — PROCHLORPERAZINE MALEATE 10 MG
10 TABLET ORAL EVERY 6 HOURS PRN
Status: CANCELLED | OUTPATIENT
Start: 2021-04-01

## 2021-04-01 RX ORDER — NALOXONE HYDROCHLORIDE 0.4 MG/ML
0.4 INJECTION, SOLUTION INTRAMUSCULAR; INTRAVENOUS; SUBCUTANEOUS
Status: CANCELLED | OUTPATIENT
Start: 2021-04-01

## 2021-04-01 RX ORDER — FLUMAZENIL 0.1 MG/ML
0.2 INJECTION, SOLUTION INTRAVENOUS
Status: CANCELLED | OUTPATIENT
Start: 2021-04-01 | End: 2021-04-02

## 2021-04-01 RX ORDER — ONDANSETRON 4 MG/1
4 TABLET, ORALLY DISINTEGRATING ORAL EVERY 6 HOURS PRN
Status: CANCELLED | OUTPATIENT
Start: 2021-04-01

## 2021-04-01 RX ORDER — ONDANSETRON 2 MG/ML
4 INJECTION INTRAMUSCULAR; INTRAVENOUS EVERY 6 HOURS PRN
Status: CANCELLED | OUTPATIENT
Start: 2021-04-01

## 2021-04-01 RX ORDER — NALOXONE HYDROCHLORIDE 0.4 MG/ML
0.2 INJECTION, SOLUTION INTRAMUSCULAR; INTRAVENOUS; SUBCUTANEOUS
Status: CANCELLED | OUTPATIENT
Start: 2021-04-01

## 2021-04-01 RX ORDER — FENTANYL CITRATE 50 UG/ML
INJECTION, SOLUTION INTRAMUSCULAR; INTRAVENOUS PRN
Status: COMPLETED | OUTPATIENT
Start: 2021-04-01 | End: 2021-04-01

## 2021-04-01 RX ORDER — SODIUM CHLORIDE 9 MG/ML
INJECTION, SOLUTION INTRAVENOUS CONTINUOUS PRN
Status: COMPLETED | OUTPATIENT
Start: 2021-04-01 | End: 2021-04-01

## 2021-04-01 RX ADMIN — FENTANYL CITRATE 100 MCG: 50 INJECTION, SOLUTION INTRAMUSCULAR; INTRAVENOUS at 12:16

## 2021-04-01 RX ADMIN — SODIUM CHLORIDE 125 ML/HR: 9 INJECTION, SOLUTION INTRAVENOUS at 12:22

## 2021-04-01 RX ADMIN — MIDAZOLAM 2 MG: 1 INJECTION INTRAMUSCULAR; INTRAVENOUS at 12:16

## 2021-04-01 ASSESSMENT — MIFFLIN-ST. JEOR: SCORE: 1109.32

## 2021-04-01 NOTE — H&P
Pre-Endoscopy History and Physical     Lynn Thompson MRN# [id number]   YOB: 1965 Age: 56 year old     Date of Procedure: 4/1/2021  Primary care provider: Maryana Brooks  Type of Endoscopy: colonoscopy  Reason for Procedure: screening  Type of Anesthesia Anticipated: Conscious Sedation    HPI:    Lynn is a 56 year old female who will be undergoing the above procedure.      A history and physical has been performed. The patient's medications and allergies have been reviewed. The risks and benefits of the procedure and the sedation options and risks were discussed with the patient.  All questions were answered and informed consent was obtained.      She denies a personal or family history of anesthesia complications or bleeding disorders.     Patient Active Problem List   Diagnosis     Iron deficiency anemia secondary to inadequate dietary iron intake     Gastric bypass status for obesity     Health Care Home     Chronic pain syndrome     Exocrine pancreatic insufficiency     Asplenia     BLU (obstructive sleep apnea)     S/P exploratory laparotomy     Dysthymia     Anxiety     Thyroid nodule     Acquired hypothyroidism     Post-pancreatectomy diabetes (H)     E coli bacteremia     Antibiotic-associated diarrhea     Elevated LFTs     Fever     Dog bite, initial encounter     Cellulitis, unspecified cellulitis site     Other iron deficiency anemia        Past Medical History:   Diagnosis Date     Benign paroxysmal positional vertigo     occ.      Calculus of kidney 05/2005    x1 on L side passed, several stones.  Has been tested for oxalate.     Chronic abdominal pain 07/17/2013     Chronic pain      Chronic pancreatitis 07/17/2013     Depression     also occ panic spells     Depressive disorder      Dyspepsia 06/1999    H. pylori   treated     Headaches     still periodic HA's ;  often 5X/week     Hypertension 02/22/2016    Stress related     Iron deficiency anemia secondary to inadequate  dietary iron intake 2003    relates to gastric bypass     Post-pancreatectomy diabetes melltius 2013     Sleep apnea     uses splint     Spasm of sphincter of Oddi     surgical + endoscopic stenting of pancreatic duct @ [facility] 06     Thyroid nodule 2016     Vaccination not carried out         Past Surgical History:   Procedure Laterality Date     ABDOMINOPLASTY  2002    Tummy tuck     APPENDECTOMY  1990     BUNIONECTOMY Right 1998     CBD Stent placement  2002    CBD stent; Dr. Presley      SECTION       CHOLECYSTECTOMY       COLONOSCOPY   &     colonoscopy     COLONOSCOPY       COLONOSCOPY N/A 3/31/2021    Procedure: COLONOSCOPY INCOMPLETE Aborted due to incomplete prep  will need to take additional prep and return tomorrow 21;  Surgeon: Ihsan Saenz MD;  Location:  GI     COMBINED CYSTOSCOPY, RETROGRADES, URETEROSCOPY, LASER HOLMIUM LITHOTRIPSY URETER(S), INSERT STENT Right 2015    Procedure: COMBINED CYSTOSCOPY, RETROGRADES, URETEROSCOPY, LASER HOLMIUM LITHOTRIPSY URETER(S), INSERT STENT;  Surgeon: Kennedi Aldana MD;  Location: [facility]     COMBINED CYSTOSCOPY, RETROGRADES, URETEROSCOPY, LASER HOLMIUM LITHOTRIPSY URETER(S), INSERT STENT Right 2015    Procedure: COMBINED CYSTOSCOPY, RETROGRADES, URETEROSCOPY, LASER HOLMIUM LITHOTRIPSY URETER(S), INSERT STENT;  Surgeon: Kennedi Aldana MD;  Location: [facility] OR     CYSTECTOMY OVARIAN BENIGN Right      CYSTOSCOPY, RETROGRADES, INSERT STENT URETER(S), COMBINED  10/02/2012    Procedure: COMBINED CYSTOSCOPY, RETROGRADES, INSERT STENT URETER(S);  COMBINED CYSTOSCOPY,  , INSERT LEFT STENT URETER;  Surgeon: Johny Baez MD;  Location:  OR     ESOPHAGOSCOPY, GASTROSCOPY, DUODENOSCOPY (EGD), COMBINED N/A 2018    Procedure: COMBINED ESOPHAGOSCOPY, GASTROSCOPY, DUODENOSCOPY (EGD);  ESOPHAGOSCOPY, GASTROSCOPY, DUODENOSCOPY (EGD)    ;  Surgeon: Tamir Rodgers MD;  Location:   GI     EXTRACORPOREAL SHOCK WAVE LITHOTRIPSY (ESWL)  10/16/2012    Procedure: EXTRACORPOREAL SHOCK WAVE LITHOTRIPSY (ESWL);  left EXTRACORPOREAL SHOCK WAVE LITHOTRIPSY (ESWL) ;  Surgeon: Johny Baez MD;  Location: [facility] OR     Gastric bypass NOS       HERNIA REPAIR  02/2015     IRRIGATION AND DEBRIDEMENT HAND, COMBINED Left 10/30/2020    Procedure: Left hand sharp excisional debridement of skin, subcutaneous tissue and fat with a scalpel, 2 x 1 x 1 cm.;  Surgeon: Demian Renteria MD;  Location: [facility] OR     LAP, LYSIS OF ADHESIONS       LAPAROSCOPIC LYSIS ADHESIONS N/A 02/20/2015    Procedure: LAPAROSCOPIC LYSIS ADHESIONS;  Surgeon: Aaron Early MD;  [facility]     LAPAROSCOPIC LYSIS ADHESIONS N/A 12/29/2015    Procedure: LAPAROSCOPIC LYSIS ADHESIONS;  Surgeon: Aaron Early MD;  [facility]     PANCREATECTOMY, TRANSPLANT AUTO ISLET CELL, COMBINED  05/10/2013    Procedure: COMBINED PANCREATECTOMY, TRANSPLANT AUTO ISLET CELL;  Pancreatectomy, Auto Islet Cell Transplant   hernia repair, jejunostomy tube and liver biopsies with Anesthesia General with block;  Surgeon: Aaron Early MD;  [facility]     Partial ileum resection  1992     REPAIR PTOSIS BROW BILATERAL Bilateral 06/09/2020    Procedure: BILATERAL BROW PTOSIS REPAIR;  Surgeon: Denise Alberts MD;  Location:  OR     Surgery for SBO  2015     TONSILLECTOMY, ADENOIDECTOMY, COMBINED  1997       Relevant Family History: NONE    Relevant Social History: NONE     Prior to Admission medications    Medication Sig Start Date End Date Taking? Authorizing Provider   amylase-lipase-protease (VIOKACE) 14393 units TABS tablet Take 4-5 with meals and 2 with snacks 8/19/20  Yes Adriana Robles MD   DULoxetine (CYMBALTA) 20 MG capsule Take 20 mg by mouth daily   Yes Unknown, Entered By History   escitalopram (LEXAPRO) 20 MG tablet Take 20 mg by mouth daily   Yes Unknown, Entered By History   famotidine (PEPCID) 40 MG tablet Take 1 tablet (40  mg) by mouth 2 times daily as needed for heartburn 5/20/20  Yes Maryana Brooks MD   gabapentin (NEURONTIN) 300 MG capsule Take 300 mg by mouth nightly as needed   Yes Unknown, Entered By History   hydrOXYzine (ATARAX) 25 MG tablet TAKE 1-2 TABLETS BY MOUTH 2 TIMES DAILY AS NEEDED FOR ANXIETY 7/25/19  Yes Reported, Patient   insulin aspart (NOVOLOG PENFILL) 100 UNIT/ML cartridge Inject 0.5-2 units subcutaneously 4 times daily with meals and at bedtime 12/30/20  Yes Adriana Robles MD   insulin glargine (LANTUS PEN) 100 UNIT/ML pen Inject 6 units daily 11/23/20  Yes Adriana Robles MD   levothyroxine (SYNTHROID/LEVOTHROID) 100 MCG tablet Take 1 tablet (100 mcg) by mouth daily. SCHEDULE APPOINTMENT FOR FURTHER REFILLS  12/30/20  Yes Ravinder Kendall MD   loratadine (CLARITIN) 10 MG tablet Take 10 mg by mouth daily as needed    Yes Unknown, Entered By History   modafinil (PROVIGIL) 200 MG tablet Take 400 mg by mouth daily   Yes Unknown, Entered By History   omeprazole (PRILOSEC) 40 MG DR capsule Take 1 capsule (40 mg) by mouth 2 times daily Take 30-60 minutes before a meal. 8/3/20  Yes Maryana Brooks MD   ondansetron (ZOFRAN-ODT) 8 MG ODT tab Take 8 mg by mouth 3 times daily as needed for nausea   Yes Unknown, Entered By History   traZODone (DESYREL) 50 MG tablet Take 50 mg by mouth nightly as needed for sleep   Yes Unknown, Entered By History   amylase-lipase-protease (VIOKACE) 14467 units TABS tablet Take 2 capsules by mouth Take with snacks or supplements    Unknown, Entered By History   calcium carbonate 600 mg-vitamin D 400 units (CALTRATE) 600-400 MG-UNIT per tablet Take 1 tablet by mouth daily    Unknown, Entered By History   Continuous Blood Gluc  (DEXCOM G6 ) MODESTA Use as directed to check blood glucose levels 8/25/20   Adriana Robles MD   Continuous Blood Gluc Sensor (DEXCOM G6 SENSOR) MISC Change every 10 days 8/25/20   Adriana Robles MD   Continuous Blood Gluc  "Transmit (DEXCOM G6 TRANSMITTER) MISC Change every 3 months 8/25/20   Adriana Robles MD   estradiol (ESTRACE) 0.1 MG/GM vaginal cream PLACE 2 GRAMS VAGINALLY TWICE A WEEK 4/12/20   Kavitha Spencer,    glucose 40 % GEL Take 15-30 g by mouth every 15 minutes as needed. 5/17/13   Suzi Short, APRN CNP   lactobacillus rhamnosus, GG, (CULTURELL) capsule Take 1 capsule by mouth daily    Unknown, Entered By History   methylcellulose (CITRUCEL) 500 MG TABS tablet Take 500 mg by mouth daily    Unknown, Entered By History   STATIN NOT PRESCRIBED (INTENTIONAL) Please choose reason not prescribed from choices below. 3/16/21   Maryana Brooks MD   UNABLE TO FIND MEDICATION NAME: Dopatone Active    Reported, Patient   UNABLE TO FIND MEDICATION NAME: magnezyme    Reported, Patient   UNABLE TO FIND MEDICATION NAME: immunoG PRP    Reported, Patient   UNABLE TO FIND MEDICATION NAME: GI-Synergy    Reported, Patient       Allergies   Allergen Reactions     Corticosteroids Other (See Comments)     All oral, IV and injectable steroids are contraindicated (unless in life threatening situations) in Islet Auto transplant recipients. They can cause irreversible loss of islet cell function. Please contact patient's transplant care coordinator, Erlinda Multani RN BSN at [phone]/pager: [phone] and endocrinologist prior to administration.       Povidone Iodine Hives     Causes skin to blister     Naproxen      Other reaction(s): Abdominal Pain  Pt allergic to Naprosyn     Nsaids      naprosyn = GI upset     Povidone Iodine      blisters     Sulfasalazine Nausea and Nausea and Vomiting        REVIEW OF SYSTEMS:   A relevant review of systems was performed and was negative    PHYSICAL EXAM:   /73   Pulse 63   Resp 15   Ht 1.626 m (5' 4\")   Wt 54.4 kg (120 lb)   LMP 12/19/2013   SpO2 98%   BMI 20.60 kg/m   Estimated body mass index is 20.6 kg/m  as calculated from the following:    Height as of this " "encounter: 1.626 m (5' 4\").    Weight as of this encounter: 54.4 kg (120 lb).   GENERAL APPEARANCE: alert, and oriented  MENTAL STATUS: alert  AIRWAY EXAM: Normal  RESP: lungs clear to auscultation - no rales, rhonchi or wheezes  CV: regular rates and rhythm  DIAGNOSTICS:    Not indicated    IMPRESSION   ASA Class 2 - Mild systemic disease    PLAN:   Plan for colonoscopy. We discussed the risks, benefits and alternatives and the patient wished to proceed.      Signed Electronically by: Ihsan Saenz MD  April 1, 2021            "

## 2021-04-01 NOTE — DISCHARGE INSTRUCTIONS
The patient has received a copy of the Provation  report the doctor has written and discharge instructions have been discussed with the patient and responsible adult.  All questions were addressed and answered prior to patient discharge.      Understanding Colon and Rectal Polyps     The colon has a smooth lining composed of millions of cells.     The colon (also called the large intestine) is a muscular tube that forms the last part of the digestive tract. It absorbs water and stores food waste. The colon is about 4 to 6 feet long. The rectum is the last 6 inches of the colon. The colon and rectum have a smooth lining composed of millions of cells. Changes in these cells can lead to growths in the colon that can become cancerous and should be removed.     When the Colon Lining Changes  Changes that occur in the cells that line the colon or rectum can lead to growths called polyps. Over a period of years, polyps can turn cancerous. Removing polyps early may prevent cancer from ever forming.      Polyps  Polyps are fleshy clumps of tissue that form on the lining of the colon or rectum. Small polyps are usually benign (not cancerous). However, over time, cells in a polyp can change and become cancerous. The larger a polyp grows, the more likely this is to happen. Also, certain types of polyps known as adenomatous polyps are considered premalignant. This means that they will almost always become cancerous if they re not removed.          Cancer  Almost all colorectal cancers start when polyp cells begin growing abnormally. As a cancerous tumor grows, it may involve more and more of the colon or rectum. In time, cancer can also grow beyond the colon or rectum and spread to nearby organs or to glands called lymph nodes. The cells can also travel to other parts of the body. This is known as metastasis. The earlier a cancerous tumor is removed, the better the chance of preventing its spread.        [id number] [employer]  [employer], [address] [place] [address], [place] [address]. All rights reserved. This information is not intended as a substitute for professional medical care. Always follow your healthcare professional's instructions.

## 2021-04-02 ENCOUNTER — IMMUNIZATION (OUTPATIENT)
Dept: NURSING | Facility: CLINIC | Age: 56
End: 2021-04-02
Attending: INTERNAL MEDICINE
Payer: MEDICARE

## 2021-04-02 LAB — COPATH REPORT: NORMAL

## 2021-04-02 PROCEDURE — 91300 PR COVID VAC PFIZER DIL RECON 30 MCG/0.3 ML IM: CPT

## 2021-04-02 PROCEDURE — 0002A PR COVID VAC PFIZER DIL RECON 30 MCG/0.3 ML IM: CPT

## 2021-04-20 ASSESSMENT — PATIENT HEALTH QUESTIONNAIRE - PHQ9
SUM OF ALL RESPONSES TO PHQ QUESTIONS 1-9: 3
10. IF YOU CHECKED OFF ANY PROBLEMS, HOW DIFFICULT HAVE THESE PROBLEMS MADE IT FOR YOU TO DO YOUR WORK, TAKE CARE OF THINGS AT HOME, OR GET ALONG WITH OTHER PEOPLE: NOT DIFFICULT AT ALL
SUM OF ALL RESPONSES TO PHQ QUESTIONS 1-9: 3

## 2021-04-21 ASSESSMENT — PATIENT HEALTH QUESTIONNAIRE - PHQ9: SUM OF ALL RESPONSES TO PHQ QUESTIONS 1-9: 3

## 2021-04-21 NOTE — PROGRESS NOTES
Pre-Visit Planning     Appointment   11:15 am with Dr. Brooks    Appointment Notes for this encounter:   Follow up to previous visit. Continuing weight loss and fatigue    Questionnaires Reviewed/Assigned  No additional questionnaires are needed      Patient preferred phone number: [phone]    [employer] App

## 2021-04-22 ENCOUNTER — VIRTUAL VISIT (OUTPATIENT)
Dept: FAMILY MEDICINE | Facility: CLINIC | Age: 56
End: 2021-04-22
Payer: MEDICARE

## 2021-04-22 DIAGNOSIS — K62.3 RECTAL PROLAPSE: ICD-10-CM

## 2021-04-22 DIAGNOSIS — R63.4 UNINTENDED WEIGHT LOSS: Primary | ICD-10-CM

## 2021-04-22 PROCEDURE — 99213 OFFICE O/P EST LOW 20 MIN: CPT | Mod: 95 | Performed by: FAMILY MEDICINE

## 2021-04-22 NOTE — PROGRESS NOTES
Lynn is a 56 year old who is being evaluated via a billable video visit.      How would you like to obtain your AVS? Mishel  Send to e-mail at: [email or web address]  Will anyone else be joining your video visit? No      Video Start Time: 11:15 AM    Assessment & Plan     Unintended weight loss - has consult with heme/onc already. I sent a message to her Endo, alerting her to the situation, asking for suggestions.     Rectal prolapse   - COLORECTAL SURGERY REFERRAL    Return in about 6 months (around 10/22/2021) for Already has visit scheduled, Yearly Preventive Exam.    Maryana Brooks MD  [facility]      Tyesha Bailey is a 56 year old who presents for the following health issues     History of Present Illness       She eats 4 or more servings of fruits and vegetables daily.She consumes 0 sweetened beverage(s) daily.She exercises with enough effort to increase her heart rate 9 or less minutes per day.  She exercises with enough effort to increase her heart rate 3 or less days per week.   She is taking medications regularly.     Continues to struggle with unintentional weight loss. 15-20 lbs over the past 6 months.     She has history of EPI, s/p pancreatectomy, insulin dependent diabetes, s/p gastric bypass - following with Endo.     Colonoscopy relatively unremarkable.   Last mammogram normal.     Has anemia, has for years. Stable over the past 10 months.     Has rectal prolapse - would like referral.       Review of Systems   Constitutional, HEENT, cardiovascular, pulmonary, gi and gu systems are negative, except as otherwise noted.      Objective           Vitals:  No vitals were obtained today due to virtual visit.    Physical Exam   GENERAL: Healthy, alert and no distress  EYES: Eyes grossly normal to inspection.  No discharge or erythema, or obvious scleral/conjunctival abnormalities.  RESP: No audible wheeze, cough, or visible cyanosis.  No visible retractions or  increased work of breathing.    SKIN: Visible skin clear. No significant rash, abnormal pigmentation or lesions.  NEURO: Cranial nerves grossly intact.  Mentation and speech appropriate for age.  PSYCH: Mentation appears normal, affect normal/bright, judgement and insight intact, normal speech and appearance well-groomed.          Video-Visit Details    Type of service:  Video Visit    Video End Time: 11:25 AM    Originating Location (pt. Location): Home    Distant Location (provider location):  [facility]     Platform used for Video Visit: Billie

## 2021-04-26 ENCOUNTER — TELEPHONE (OUTPATIENT)
Dept: FAMILY MEDICINE | Facility: CLINIC | Age: 56
End: 2021-04-26

## 2021-04-26 NOTE — TELEPHONE ENCOUNTER
Patient Quality Outreach      Summary:    Patient has the following on her problem list/HM:   Diabetes    Last A1C:  Lab Results   Component Value Date    A1C 6.9 02/16/2021    A1C 6.7 08/20/2020       Last LDL:    Lab Results   Component Value Date    LDL 84 09/17/2020       Is the patient on a Statin? No          Is the patient on Aspirin? No    Medications     HMG CoA Reductase Inhibitors     STATIN NOT PRESCRIBED (INTENTIONAL)             Last three blood pressure readings:  BP Readings from Last 3 Encounters:   04/01/21 111/59   03/31/21 125/66   03/15/21 (!) 140/80            Tobacco Use      Smoking status: Never Smoker      Smokeless tobacco: Never Used          Patient is due/failing the following: eye exam results needed for diabetic retinopopthy      Type of outreach:    Phone, left message for patient/parent to call back.    Questions for provider review:    none                                                                                                                                     Moises James [facility] [address]           Chart routed to care team.

## 2021-04-26 NOTE — TELEPHONE ENCOUNTER
----- Message from Adriana Robles MD sent at 4/23/2021  4:47 PM CDT -----  Regarding: RE: weight loss  Thanks for the heads up.    I think we should check a TTg.    Lynn is on Viokace for enzymes.  That is not new, but I wonder if we should try a mix of the non enteric coated (Viokace) with an enteric coated enzyme like Creon or ZenPep.    I'll talk to her in clinic next month.    Adriana    ----- Message -----  From: Maryana Brooks MD  Sent: 4/22/2021  11:21 AM CDT  To: Adriana Robles MD  Subject: weight loss                                      Hi Dr. Robles,     Met with Lynn this AM and earlier this year.     She has been experiencing unintentional weight loss over the past several months - about 20 lbs.     She has such a complex medical history, so I wanted to alert you in case you could add any suggestions. She meets with you next month as well.     She had a relatively unremarkable colonoscopy last month, and she is meeting with Hematology/Oncology early next month as well.     Her mammogram was negative.   Her OBGYN consult was unremarkable, although she is struggling with rectal prolapse.       My best,  Maryana Brooks MD  [facility]

## 2021-04-29 NOTE — TELEPHONE ENCOUNTER
RECORDS RECEIVED FROM: Internal   DATE RECEIVED: 6.25.21   NOTES STATUS DETAILS   OFFICE NOTE from referring provider  Internal 4.22.21 JOSEFINA Brooks [facility]   OFFICE NOTE from other specialist   N/A    DISCHARGE SUMMARY from hospital  N/A    DISCHARGE REPORT from the ER N/A    OPERATIVE REPORT  N/A    MEDICATION LIST Internal    LABS     PFC TESTING N/A    ANAL PAP N/A    BIOPSIES/PATHOLOGY RELATED TO DIAGNOSIS Internal 4.1.21   DIAGNOSTIC PROCEDURES     COLONOSCOPY Internal 4.1.21   UPPER ENDOSCOPY (EGD) N/A    FLEX SIGMOIDOSCOPY  N/A    ERCP N/A    IMAGING (DISC & REPORT)      CT  Internal 3.1.21 ab pelvis  5.7.19 ab pelvis  10.8.18 ab pelvis  8.24.17 ab pelvis   MRI N/A    XRAY N/A    ULTRASOUND (ENDOANAL/ENDORECTAL) N/A

## 2021-05-01 DIAGNOSIS — K21.00 GASTROESOPHAGEAL REFLUX DISEASE WITH ESOPHAGITIS: ICD-10-CM

## 2021-05-03 ENCOUNTER — DOCUMENTATION ONLY (OUTPATIENT)
Dept: FAMILY MEDICINE | Facility: CLINIC | Age: 56
End: 2021-05-03

## 2021-05-03 RX ORDER — FAMOTIDINE 40 MG/1
40 TABLET, FILM COATED ORAL 2 TIMES DAILY PRN
Qty: 180 TABLET | Refills: 3 | Status: SHIPPED | OUTPATIENT
Start: 2021-05-03 | End: 2022-06-29

## 2021-05-06 ENCOUNTER — PRE VISIT (OUTPATIENT)
Dept: ONCOLOGY | Facility: CLINIC | Age: 56
End: 2021-05-06

## 2021-05-06 ENCOUNTER — VIRTUAL VISIT (OUTPATIENT)
Dept: ONCOLOGY | Facility: CLINIC | Age: 56
End: 2021-05-06
Attending: FAMILY MEDICINE
Payer: MEDICARE

## 2021-05-06 DIAGNOSIS — R63.30 FEEDING DIFFICULTIES: ICD-10-CM

## 2021-05-06 DIAGNOSIS — D50.8 OTHER IRON DEFICIENCY ANEMIA: ICD-10-CM

## 2021-05-06 PROCEDURE — 99204 OFFICE O/P NEW MOD 45 MIN: CPT | Mod: 95 | Performed by: INTERNAL MEDICINE

## 2021-05-06 RX ORDER — HEPARIN SODIUM (PORCINE) LOCK FLUSH IV SOLN 100 UNIT/ML 100 UNIT/ML
5 SOLUTION INTRAVENOUS
Status: CANCELLED | OUTPATIENT
Start: 2021-05-13

## 2021-05-06 RX ORDER — HEPARIN SODIUM,PORCINE 10 UNIT/ML
5 VIAL (ML) INTRAVENOUS
Status: CANCELLED | OUTPATIENT
Start: 2021-05-13

## 2021-05-06 NOTE — PROGRESS NOTES
Lynn is a 56 year old who is being evaluated via a billable video visit.      How would you like to obtain your AVS? MyChart  If the video visit is dropped, the invitation should be resent by: Text to cell phone: [phone]  Will anyone else be joining your video visit? Ina     Sabrina Abimbola, ALTHEA on 5/6/2021 at 10:24 AM      Video Start Time: 10:53 AM  Video-Visit Details    Type of service:  Video Visit    Video End Time:11:02 AM    Originating Location (pt. 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       Lynn says that she has had anemia all of her life.  She was previously followed by Dr. Tom Malcolm at [facility] since 2004.  She was followed and treated for iron-deficiency anemia.  It was felt that it was due to poor absorption from her gastric bypass and various bowel surgeries in the past.  She was unable to tolerate oral and and could not absorb it.  She gets IV iron intermittently.  She says a round of IV iron would last her for several years.  She last saw Dr. Malcolm in 2019.      Patient has been anemic again.  She underwent colonoscopy on 4/1/21, which found some polyps that were removed.  Due to unintentional weight loss, she underwent mammogram that was negative.  She had a CT abdomen/pelvis on 3/1/21 that was normal, other than non-specific proximal colitis.  She does not smoke.        REVIEW OF SYSTEMS:   14  point ROS was reviewed and is negative other than as noted above in HPI.       HOME MEDICATIONS:  Current Outpatient Medications   Medication Sig Dispense Refill     amylase-lipase-protease (VIOKACE) 87920 units TABS tablet Take 4-5 with meals and 2 with snacks 500 tablet 11     calcium carbonate 600 mg-vitamin D 400 units (CALTRATE) 600-400 MG-UNIT per tablet Take 1 tablet by mouth daily       Continuous Blood Gluc  (DEXCOM G6 ) MODESTA Use as directed to check blood glucose levels 1 Device 1     Continuous Blood Gluc Sensor (DEXCOM G6 SENSOR) MISC Change every 10 days 3 each 11     Continuous Blood Gluc Transmit (DEXCOM G6 TRANSMITTER) MISC Change every 3 months 1 each 3     DULoxetine (CYMBALTA) 20 MG capsule Take 20 mg by mouth daily       escitalopram (LEXAPRO) 20 MG tablet Take 20 mg by mouth daily       estradiol (ESTRACE) 0.1 MG/GM vaginal cream PLACE 2 GRAMS VAGINALLY TWICE A WEEK 42.5 g 11     famotidine (PEPCID) 40 MG tablet Take 1 tablet (40 mg) by mouth 2 times daily as needed for heartburn 180 tablet 3     gabapentin (NEURONTIN) 300 MG capsule Take 300 mg by mouth nightly as needed       glucose 40 % GEL Take 15-30 g by mouth every 15 minutes as needed. 1 Tube 11     hydrOXYzine (ATARAX) 25 MG tablet TAKE 1-2 TABLETS BY MOUTH 2 TIMES DAILY AS NEEDED FOR ANXIETY  0     insulin aspart (NOVOLOG PENFILL) 100 UNIT/ML cartridge Inject 0.5-2 units subcutaneously 4 times daily with meals and at bedtime 15 mL 11     insulin glargine (LANTUS PEN) 100 UNIT/ML pen Inject 6 units daily 15 mL 3     levothyroxine (SYNTHROID/LEVOTHROID) 100 MCG tablet Take 1 tablet (100 mcg) by mouth daily. SCHEDULE APPOINTMENT FOR FURTHER REFILLS  90 tablet 1     loratadine (CLARITIN) 10 MG tablet Take 10 mg by mouth daily as needed        modafinil (PROVIGIL) 200 MG tablet Take 400 mg by mouth daily       omeprazole (PRILOSEC) 40 MG DR capsule Take 1 capsule (40 mg) by mouth 2 times daily Take 30-60 minutes before a  meal. 180 capsule 3     ondansetron (ZOFRAN-ODT) 8 MG ODT tab Take 8 mg by mouth 3 times daily as needed for nausea       STATIN NOT PRESCRIBED (INTENTIONAL) Please choose reason not prescribed from choices below.       traZODone (DESYREL) 50 MG tablet Take 50 mg by mouth nightly as needed for sleep           ALLERGIES:  Allergies   Allergen Reactions     Corticosteroids Other (See Comments)     All oral, IV and injectable steroids are contraindicated (unless in life threatening situations) in Islet Auto transplant recipients. They can cause irreversible loss of islet cell function. Please contact patient's transplant care coordinator, Erlinda Multani RN BSN at [phone]/pager: [phone] and endocrinologist prior to administration.       Povidone Iodine Hives     Causes skin to blister     Naproxen      Other reaction(s): Abdominal Pain  Pt allergic to Naprosyn     Nsaids      naprosyn = GI upset     Povidone Iodine      blisters     Sulfasalazine Nausea and Nausea and Vomiting         PAST MEDICAL HISTORY:  Past Medical History:   Diagnosis Date     Benign paroxysmal positional vertigo     occ.      Calculus of kidney 05/2005    x1 on L side passed, several stones.  Has been tested for oxalate.     Chronic abdominal pain 07/17/2013     Chronic pain      Chronic pancreatitis 07/17/2013     Depression     also occ panic spells     Depressive disorder      Diabetes (H) 5/10/2013    Total Pancreatomy with Auto Islets Transplant     Dyspepsia 06/1999    H. pylori   treated     Headaches     still periodic HA's ;  often 5X/week     Hypertension 02/22/2016    Stress related     Iron deficiency anemia secondary to inadequate dietary iron intake 11/2003    relates to gastric bypass     Post-pancreatectomy diabetes melltius 05/17/2013     Sleep apnea     uses splint     Spasm of sphincter of Oddi     surgical + endoscopic stenting of pancreatic duct @ [facility] 5/23/06     Thyroid nodule 11/01/2016     Vaccination not  carried out          PAST SURGICAL HISTORY:  Past Surgical History:   Procedure Laterality Date     ABDOMINOPLASTY  2002    Tummy tuck     APPENDECTOMY  1990     BUNIONECTOMY Right 1998     CBD Stent placement  2002    CBD stent; Dr. Presley      SECTION       CHOLECYSTECTOMY       COLONOSCOPY   &     colonoscopy     COLONOSCOPY       COLONOSCOPY N/A 3/31/2021    Procedure: COLONOSCOPY INCOMPLETE Aborted due to incomplete prep  will need to take additional prep and return tomorrow 21;  Surgeon: Ihsan Saenz MD;  Location: [facility]     COMBINED CYSTOSCOPY, RETROGRADES, URETEROSCOPY, LASER HOLMIUM LITHOTRIPSY URETER(S), INSERT STENT Right 2015    Procedure: COMBINED CYSTOSCOPY, RETROGRADES, URETEROSCOPY, LASER HOLMIUM LITHOTRIPSY URETER(S), INSERT STENT;  Surgeon: Kennedi Aldana MD;  Location: [facility]     COMBINED CYSTOSCOPY, RETROGRADES, URETEROSCOPY, LASER HOLMIUM LITHOTRIPSY URETER(S), INSERT STENT Right 2015    Procedure: COMBINED CYSTOSCOPY, RETROGRADES, URETEROSCOPY, LASER HOLMIUM LITHOTRIPSY URETER(S), INSERT STENT;  Surgeon: Kennedi Aldana MD;  Location: [facility]     COSMETIC SURGERY  2002    Tummy tuck     CYSTECTOMY OVARIAN BENIGN Right      CYSTOSCOPY, RETROGRADES, INSERT STENT URETER(S), COMBINED  10/02/2012    Procedure: COMBINED CYSTOSCOPY, RETROGRADES, INSERT STENT URETER(S);  COMBINED CYSTOSCOPY,  , INSERT LEFT STENT URETER;  Surgeon: Johny Baez MD;  Location:  OR     ESOPHAGOSCOPY, GASTROSCOPY, DUODENOSCOPY (EGD), COMBINED N/A 2018    Procedure: COMBINED ESOPHAGOSCOPY, GASTROSCOPY, DUODENOSCOPY (EGD);  ESOPHAGOSCOPY, GASTROSCOPY, DUODENOSCOPY (EGD)    ;  Surgeon: Tamir Rodgers MD;  Location:  GI     EXTRACORPOREAL SHOCK WAVE LITHOTRIPSY (ESWL)  10/16/2012    Procedure: EXTRACORPOREAL SHOCK WAVE LITHOTRIPSY (ESWL);  left EXTRACORPOREAL SHOCK WAVE LITHOTRIPSY (ESWL) ;  Surgeon: Johny Baez MD;  Location:   OR     Gastric bypass NOS       HERNIA REPAIR  02/2015     IRRIGATION AND DEBRIDEMENT HAND, COMBINED Left 10/30/2020    Procedure: Left hand sharp excisional debridement of skin, subcutaneous tissue and fat with a scalpel, 2 x 1 x 1 cm.;  Surgeon: Demian Renteria MD;  Location: [facility]     LAP, LYSIS OF ADHESIONS       LAPAROSCOPIC LYSIS ADHESIONS N/A 02/20/2015    Procedure: LAPAROSCOPIC LYSIS ADHESIONS;  Surgeon: Aaron Early MD;  [facility]: [facility]     LAPAROSCOPIC LYSIS ADHESIONS N/A 12/29/2015    Procedure: LAPAROSCOPIC LYSIS ADHESIONS;  Surgeon: Aaron Early MD;  [facility]     PANCREATECTOMY, TRANSPLANT AUTO ISLET CELL, COMBINED  05/10/2013    Procedure: COMBINED PANCREATECTOMY, TRANSPLANT AUTO ISLET CELL;  Pancreatectomy, Auto Islet Cell Transplant   hernia repair, jejunostomy tube and liver biopsies with Anesthesia General with block;  Surgeon: Aaron Early MD;  Location: [facility]     Partial ileum resection  1992     REPAIR PTOSIS BROW BILATERAL Bilateral 06/09/2020    Procedure: BILATERAL BROW PTOSIS REPAIR;  Surgeon: Denise Alberts MD;  Location:  OR     Surgery for SBO  2015     TONSILLECTOMY, ADENOIDECTOMY, COMBINED  1997     TRANSPLANT  5/10/13    Pancreatic Auto-Islet Transplant         SOCIAL HISTORY:  Social History     Socioeconomic History     Marital status:      Spouse name: Tera     Number of children: 2     Years of education: Not on file     Highest education level: Some college, no degree   Occupational History     Occupation: customer service     Employer: [employer]   Social Needs     Financial resource strain: Hard     Food insecurity     Worry: Sometimes true     Inability: Never true     Transportation needs     Medical: No     Non-medical: No   Tobacco Use     Smoking status: Never Smoker     Smokeless tobacco: Never Used   Substance and Sexual Activity     Alcohol use: Not Currently     Alcohol/week: 0.0 standard drinks     Frequency: Never     Drinks per  session: Patient refused     Binge frequency: Never     Drug use: Not Currently     Sexual activity: Yes     Partners: Male     Birth control/protection: Post-menopausal   Lifestyle     Physical activity     Days per week: 0 days     Minutes per session: 0 min     Stress: Very much   Relationships     Social connections     Talks on phone: More than three times a week     Gets together: Once a week     Attends Baptism service: More than 4 times per year     Active member of club or organization: Yes     Attends meetings of clubs or organizations: More than 4 times per year     Relationship status:      Intimate partner violence     Fear of current or ex partner: Not on file     Emotionally abused: Not on file     Physically abused: Not on file     Forced sexual activity: Not on file   Other Topics Concern     Parent/sibling w/ CABG, MI or angioplasty before 65F 55M? No   Social History Narrative     Not on file         FAMILY HISTORY:  Family History   Problem Relation Age of Onset     Family History Negative Mother      Respiratory Father         COPD;  at 69     Genitourinary Problems Father         kidney stones     Substance Abuse Father      Depression Father      Asthma Father      Heart Disease Paternal Grandfather         M.I.     Coronary Artery Disease Paternal Grandfather      Hyperlipidemia Paternal Grandfather      Genitourinary Problems Brother         multiple brothers with kidney stones     Gastrointestinal Disease Maternal Grandmother         undiagnosed 'gut' issues     Coronary Artery Disease Maternal Grandfather      Hyperlipidemia Maternal Grandfather      Cerebrovascular Disease Paternal Grandmother         At the age of 103     Anxiety Disorder Paternal Grandmother      Osteoporosis Paternal Grandmother      Anxiety Disorder Son      Anxiety Disorder Daughter      Asthma Daughter      Colon Cancer No family hx of          PHYSICAL EXAM:  ECO  GENERAL/CONSTITUTIONAL: No acute  distress. Healthy, alert.  RESPIRATORY: No audible wheeze, cough, or visible cyanosis.  No visible retractions or increased work of breathing.  Able to speak fully in complete sentences.  NEUROLOGIC: Alert, oriented, answers questions appropriately. No tremor. Mentation intact and speech normal  INTEGUMENTARY: No jaundice.    PSYCHIATRIC:  Mentation appears normal, affect normal/bright, judgement and insight intact, normal speech and appearance well-groomed.    The rest of a comprehensive physical exam is deferred due to public health emergency video visit restrictions.        LABS:  CBC RESULTS:   Recent Labs   Lab Test 03/15/21  1411   WBC 7.2   RBC 3.83   HGB 10.9*   HCT 34.5*   MCV 90   MCH 28.5   MCHC 31.6   RDW 15.2*   *       Recent Labs   Lab Test 03/01/21  0208 02/28/21  0031    139   POTASSIUM 3.5 3.5   CHLORIDE 111* 107   CO2 26 28   ANIONGAP 4 4   * 120*   BUN 17 27   CR 0.85 0.95   VALARIE 8.5 9.0     Lab Results   Component Value Date    AST 18 03/01/2021     Lab Results   Component Value Date    ALT 30 03/01/2021     Lab Results   Component Value Date    BILICONJ 0.0 05/12/2014      Lab Results   Component Value Date    BILITOTAL 0.2 03/01/2021     Lab Results   Component Value Date    ALBUMIN 3.3 03/01/2021     Lab Results   Component Value Date    PROTTOTAL 6.9 03/01/2021      Lab Results   Component Value Date    ALKPHOS 116 03/01/2021     Component      Latest Ref Rng & Units 5/16/2019 8/20/2020 2/16/2021   Iron      35 - 180 ug/dL 73 71 38   Iron Binding Cap      240 - 430 ug/dL 264 285 365   Iron Saturation Index      15 - 46 % 28 25 10 (L)   Vitamin B12      193 - 986 pg/mL 252 290 278   Ferritin      8 - 252 ng/mL 79 30        ASSESSMENT/PLAN:  Lynn Thompson is a 56 year old female with:    1) Iron-deficiency anemia: chronic, likely related to poor absorption from her history of gastric bypass and bowel surgeries.  She had a colonoscopy on 4/1/21 that showed no bleeding.   She is unable to tolerate oral iron due to side effects and poor absorption.  She does get IV iron intermittently.    -will check new baseline CBC, CMP, ferritin, iron, vitamin B12, folate, copper, peripheral smear  -schedule Injectafer x 2 doses, a week apart  -RTC in ~4-6 months with repeat CBC, ferritin, iron    2) Rectal prolapse:  -saw ob/gyn  -she is going to see colorectal surgery     3) Unintentional weight loss: saw PCP, will also follow-up with Dr. Robles, getting some lab work-up and medication changes to see if that helps.  She had mammogram, colonoscopy, CT scan that showed no evidence of malignancy.      4) Thrombocytosis: likely reactive, due to iron deficiency  -will monitor CBC         Liliya Urrutia MD  Hematology/Oncology  [facility]    Total time spent on day of visit, including review of tests, obtaining/reviewing separately obtained history, ordering medications/tests/procedures, communicating with PCP/consultants, and documenting in electronic medical record: 30 minutes

## 2021-05-06 NOTE — LETTER
5/6/2021         RE: Lynn Thompson  [id number] [facility] [address]        Dear Colleague,    Thank you for referring your patient, Lynn Thompson, to the [facility] [place]. Please see a copy of my visit note below.    Lynn is a 56 year old who is being evaluated via a billable video visit.      How would you like to obtain your AVS? MyChart  If the video visit is dropped, the invitation should be resent by: Text to cell phone: [phone]  Will anyone else be joining your video visit? Ina Daily, ALTHEA on 5/6/2021 at 10:24 AM      Video Start Time: 10:53 AM  Video-Visit Details    Type of service:  Video Visit    Video End Time:11:02 AM    Originating Location (pt. 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       Lynn says that she has had anemia all of her life.  She was previously followed by Dr. Tom Malcolm at [facility] since 2004.  She was followed and treated for iron-deficiency anemia.  It was felt that it was due to poor absorption from her gastric bypass and various bowel surgeries in the past.  She was unable to tolerate oral and and could not absorb it.  She gets IV iron intermittently.  She says a round of IV iron would last her for several years.  She last saw Dr. Malcolm in 2019.      Patient has been anemic again.  She underwent colonoscopy on 4/1/21, which  found some polyps that were removed.  Due to unintentional weight loss, she underwent mammogram that was negative.  She had a CT abdomen/pelvis on 3/1/21 that was normal, other than non-specific proximal colitis.  She does not smoke.        REVIEW OF SYSTEMS:   14 point ROS was reviewed and is negative other than as noted above in HPI.       HOME MEDICATIONS:  Current Outpatient Medications   Medication Sig Dispense Refill     amylase-lipase-protease (VIOKACE) 02264 units TABS tablet Take 4-5 with meals and 2 with snacks 500 tablet 11     calcium carbonate 600 mg-vitamin D 400 units (CALTRATE) 600-400 MG-UNIT per tablet Take 1 tablet by mouth daily       Continuous Blood Gluc  (DEXCOM G6 ) MODESTA Use as directed to check blood glucose levels 1 Device 1     Continuous Blood Gluc Sensor (DEXCOM G6 SENSOR) MISC Change every 10 days 3 each 11     Continuous Blood Gluc Transmit (DEXCOM G6 TRANSMITTER) MISC Change every 3 months 1 each 3     DULoxetine (CYMBALTA) 20 MG capsule Take 20 mg by mouth daily       escitalopram (LEXAPRO) 20 MG tablet Take 20 mg by mouth daily       estradiol (ESTRACE) 0.1 MG/GM vaginal cream PLACE 2 GRAMS VAGINALLY TWICE A WEEK 42.5 g 11     famotidine (PEPCID) 40 MG tablet Take 1 tablet (40 mg) by mouth 2 times daily as needed for heartburn 180 tablet 3     gabapentin (NEURONTIN) 300 MG capsule Take 300 mg by mouth nightly as needed       glucose 40 % GEL Take 15-30 g by mouth every 15 minutes as needed. 1 Tube 11     hydrOXYzine (ATARAX) 25 MG tablet TAKE 1-2 TABLETS BY MOUTH 2 TIMES DAILY AS NEEDED FOR ANXIETY  0     insulin aspart (NOVOLOG PENFILL) 100 UNIT/ML cartridge Inject 0.5-2 units subcutaneously 4 times daily with meals and at bedtime 15 mL 11     insulin glargine (LANTUS PEN) 100 UNIT/ML pen Inject 6 units daily 15 mL 3     levothyroxine (SYNTHROID/LEVOTHROID) 100 MCG tablet Take 1 tablet (100 mcg) by mouth daily. SCHEDULE APPOINTMENT FOR FURTHER REFILLS  90 tablet 1      loratadine (CLARITIN) 10 MG tablet Take 10 mg by mouth daily as needed        modafinil (PROVIGIL) 200 MG tablet Take 400 mg by mouth daily       omeprazole (PRILOSEC) 40 MG DR capsule Take 1 capsule (40 mg) by mouth 2 times daily Take 30-60 minutes before a meal. 180 capsule 3     ondansetron (ZOFRAN-ODT) 8 MG ODT tab Take 8 mg by mouth 3 times daily as needed for nausea       STATIN NOT PRESCRIBED (INTENTIONAL) Please choose reason not prescribed from choices below.       traZODone (DESYREL) 50 MG tablet Take 50 mg by mouth nightly as needed for sleep           ALLERGIES:  Allergies   Allergen Reactions     Corticosteroids Other (See Comments)     All oral, IV and injectable steroids are contraindicated (unless in life threatening situations) in Islet Auto transplant recipients. They can cause irreversible loss of islet cell function. Please contact patient's transplant care coordinator, Erlinda Multani RN BSN at [phone]/pager: [phone] and endocrinologist prior to administration.       Povidone Iodine Hives     Causes skin to blister     Naproxen      Other reaction(s): Abdominal Pain  Pt allergic to Naprosyn     Nsaids      naprosyn = GI upset     Povidone Iodine      blisters     Sulfasalazine Nausea and Nausea and Vomiting         PAST MEDICAL HISTORY:  Past Medical History:   Diagnosis Date     Benign paroxysmal positional vertigo     occ.      Calculus of kidney 05/2005    x1 on L side passed, several stones.  Has been tested for oxalate.     Chronic abdominal pain 07/17/2013     Chronic pain      Chronic pancreatitis 07/17/2013     Depression     also occ panic spells     Depressive disorder      Diabetes (H) 5/10/2013    Total Pancreatomy with Auto Islets Transplant     Dyspepsia 06/1999    H. pylori   treated     Headaches     still periodic HA's ;  often 5X/week     Hypertension 02/22/2016    Stress related     Iron deficiency anemia secondary to inadequate dietary iron intake 11/2003     relates to gastric bypass     Post-pancreatectomy diabetes melltius 2013     Sleep apnea     uses splint     Spasm of sphincter of Oddi     surgical + endoscopic stenting of pancreatic duct [facility]     Thyroid nodule 2016     Vaccination not carried out          PAST SURGICAL HISTORY:  Past Surgical History:   Procedure Laterality Date     ABDOMINOPLASTY  2002    Tummy tuck     APPENDECTOMY  1990     BUNIONECTOMY Right 1998     CBD Stent placement  2002    CBD stent; Dr. Presley      SECTION       CHOLECYSTECTOMY       COLONOSCOPY   &     colonoscopy     COLONOSCOPY       COLONOSCOPY N/A 3/31/2021    Procedure: COLONOSCOPY INCOMPLETE Aborted due to incomplete prep  will need to take additional prep and return tomorrow 21;  Surgeon: Ihsan Saenz MD;  Location: [facility]     COMBINED CYSTOSCOPY, RETROGRADES, URETEROSCOPY, LASER HOLMIUM LITHOTRIPSY URETER(S), INSERT STENT Right 2015    Procedure: COMBINED CYSTOSCOPY, RETROGRADES, URETEROSCOPY, LASER HOLMIUM LITHOTRIPSY URETER(S), INSERT STENT;  Surgeon: Kennedi Aldana MD;  Location: [facility]     COMBINED CYSTOSCOPY, RETROGRADES, URETEROSCOPY, LASER HOLMIUM LITHOTRIPSY URETER(S), INSERT STENT Right 2015    Procedure: COMBINED CYSTOSCOPY, RETROGRADES, URETEROSCOPY, LASER HOLMIUM LITHOTRIPSY URETER(S), INSERT STENT;  Surgeon: Kennedi Aldana MD;  Location: [facility]     COSMETIC SURGERY  2002    Tummy tuck     CYSTECTOMY OVARIAN BENIGN Right      CYSTOSCOPY, RETROGRADES, INSERT STENT URETER(S), COMBINED  10/02/2012    Procedure: COMBINED CYSTOSCOPY, RETROGRADES, INSERT STENT URETER(S);  COMBINED CYSTOSCOPY,  , INSERT LEFT STENT URETER;  Surgeon: Johny Baez MD;  Location: [facility]     ESOPHAGOSCOPY, GASTROSCOPY, DUODENOSCOPY (EGD), COMBINED N/A 2018    Procedure: COMBINED ESOPHAGOSCOPY, GASTROSCOPY, DUODENOSCOPY (EGD);  ESOPHAGOSCOPY, GASTROSCOPY, DUODENOSCOPY (EGD)    ;  Surgeon:  Tamir Rodgers MD;  Location: [facility]     EXTRACORPOREAL SHOCK WAVE LITHOTRIPSY (ESWL)  10/16/2012    Procedure: EXTRACORPOREAL SHOCK WAVE LITHOTRIPSY (ESWL);  left EXTRACORPOREAL SHOCK WAVE LITHOTRIPSY (ESWL) ;  Surgeon: Johny Baez MD;  Location: [facility]     Gastric bypass NOS       HERNIA REPAIR  02/2015     IRRIGATION AND DEBRIDEMENT HAND, COMBINED Left 10/30/2020    Procedure: Left hand sharp excisional debridement of skin, subcutaneous tissue and fat with a scalpel, 2 x 1 x 1 cm.;  Surgeon: Demian Renteria MD;  Location: [facility]     LAP, LYSIS OF ADHESIONS       LAPAROSCOPIC LYSIS ADHESIONS N/A 02/20/2015    Procedure: LAPAROSCOPIC LYSIS ADHESIONS;  Surgeon: Aaron Early MD;  [facility]     LAPAROSCOPIC LYSIS ADHESIONS N/A 12/29/2015    Procedure: LAPAROSCOPIC LYSIS ADHESIONS;  Surgeon: Aaron Early MD;  [facility]     PANCREATECTOMY, TRANSPLANT AUTO ISLET CELL, COMBINED  05/10/2013    Procedure: COMBINED PANCREATECTOMY, TRANSPLANT AUTO ISLET CELL;  Pancreatectomy, Auto Islet Cell Transplant   hernia repair, jejunostomy tube and liver biopsies with Anesthesia General with block;  Surgeon: Aaron Early MD;  [facility]     Partial ileum resection  1992     REPAIR PTOSIS BROW BILATERAL Bilateral 06/09/2020    Procedure: BILATERAL BROW PTOSIS REPAIR;  Surgeon: Denise Alberts MD;  Location:  OR     Surgery for SBO  2015     TONSILLECTOMY, ADENOIDECTOMY, COMBINED  1997     TRANSPLANT  5/10/13    Pancreatic Auto-Islet Transplant         SOCIAL HISTORY:  Social History     Socioeconomic History     Marital status:      Spouse name: Tera     Number of children: 2     Years of education: Not on file     Highest education level: Some college, no degree   Occupational History     Occupation: customer service     Employer: [employer]   Social Needs     Financial resource strain: Hard     Food insecurity     Worry: Sometimes true     Inability: Never true     Transportation  needs     Medical: No     Non-medical: No   Tobacco Use     Smoking status: Never Smoker     Smokeless tobacco: Never Used   Substance and Sexual Activity     Alcohol use: Not Currently     Alcohol/week: 0.0 standard drinks     Frequency: Never     Drinks per session: Patient refused     Binge frequency: Never     Drug use: Not Currently     Sexual activity: Yes     Partners: Male     Birth control/protection: Post-menopausal   Lifestyle     Physical activity     Days per week: 0 days     Minutes per session: 0 min     Stress: Very much   Relationships     Social connections     Talks on phone: More than three times a week     Gets together: Once a week     Attends Jew service: More than 4 times per year     Active member of club or organization: Yes     Attends meetings of clubs or organizations: More than 4 times per year     Relationship status:      Intimate partner violence     Fear of current or ex partner: Not on file     Emotionally abused: Not on file     Physically abused: Not on file     Forced sexual activity: Not on file   Other Topics Concern     Parent/sibling w/ CABG, MI or angioplasty before 65F 55M? No   Social History Narrative     Not on file         FAMILY HISTORY:  Family History   Problem Relation Age of Onset     Family History Negative Mother      Respiratory Father         COPD;  at 69     Genitourinary Problems Father         kidney stones     Substance Abuse Father      Depression Father      Asthma Father      Heart Disease Paternal Grandfather         M.I.     Coronary Artery Disease Paternal Grandfather      Hyperlipidemia Paternal Grandfather      Genitourinary Problems Brother         multiple brothers with kidney stones     Gastrointestinal Disease Maternal Grandmother         undiagnosed 'gut' issues     Coronary Artery Disease Maternal Grandfather      Hyperlipidemia Maternal Grandfather      Cerebrovascular Disease Paternal Grandmother         At the age of 103      Anxiety Disorder Paternal Grandmother      Osteoporosis Paternal Grandmother      Anxiety Disorder Son      Anxiety Disorder Daughter      Asthma Daughter      Colon Cancer No family hx of          PHYSICAL EXAM:  ECO  GENERAL/CONSTITUTIONAL: No acute distress. Healthy, alert.  RESPIRATORY: No audible wheeze, cough, or visible cyanosis.  No visible retractions or increased work of breathing.  Able to speak fully in complete sentences.  NEUROLOGIC: Alert, oriented, answers questions appropriately. No tremor. Mentation intact and speech normal  INTEGUMENTARY: No jaundice.    PSYCHIATRIC:  Mentation appears normal, affect normal/bright, judgement and insight intact, normal speech and appearance well-groomed.    The rest of a comprehensive physical exam is deferred due to public [facility] emergency video visit restrictions.        LABS:  CBC RESULTS:   Recent Labs   Lab Test 03/15/21  1411   WBC 7.2   RBC 3.83   HGB 10.9*   HCT 34.5*   MCV 90   MCH 28.5   MCHC 31.6   RDW 15.2*   *       Recent Labs   Lab Test 21  0208 21  0031    139   POTASSIUM 3.5 3.5   CHLORIDE 111* 107   CO2 26 28   ANIONGAP 4 4   * 120*   BUN 17 27   CR 0.85 0.95   VALARIE 8.5 9.0     Lab Results   Component Value Date    AST 18 2021     Lab Results   Component Value Date    ALT 30 2021     Lab Results   Component Value Date    BILICONJ 0.0 2014      Lab Results   Component Value Date    BILITOTAL 0.2 2021     Lab Results   Component Value Date    ALBUMIN 3.3 2021     Lab Results   Component Value Date    PROTTOTAL 6.9 2021      Lab Results   Component Value Date    ALKPHOS 116 2021     Component      Latest Ref Rng & Units 2019   Iron      35 - 180 ug/dL 73 71 38   Iron Binding Cap      240 - 430 ug/dL 264 285 365   Iron Saturation Index      15 - 46 % 28 25 10 (L)   Vitamin B12      193 - 986 pg/mL 252 290 278   Ferritin      8 - 252 ng/mL 79 30         ASSESSMENT/PLAN:  Lynn Thompson is a 56 year old female with:    1) Iron-deficiency anemia: chronic, likely related to poor absorption from her history of gastric bypass and bowel surgeries.  She had a colonoscopy on 4/1/21 that showed no bleeding.  She is unable to tolerate oral iron due to side effects and poor absorption.  She does get IV iron intermittently.    -will check new baseline CBC, CMP, ferritin, iron, vitamin B12, folate, copper, peripheral smear  -schedule Injectafer x 2 doses, a week apart  -RTC in ~4-6 months with repeat CBC, ferritin, iron    2) Rectal prolapse:  -saw ob/gyn  -she is going to see colorectal surgery     3) Unintentional weight loss: saw PCP, will also follow-up with Dr. Robles, getting some lab work-up and medication changes to see if that helps.  She had mammogram, colonoscopy, CT scan that showed no evidence of malignancy.      4) Thrombocytosis: likely reactive, due to iron deficiency  -will monitor CBC         Liliya Urrutia MD  Hematology/Oncology  [facility]    Total time spent on day of visit, including review of tests, obtaining/reviewing separately obtained history, ordering medications/tests/procedures, communicating with PCP/consultants, and documenting in electronic medical record: 30 minutes        Again, thank you for allowing me to participate in the care of your patient.        Sincerely,        Liliya Urrutia MD

## 2021-05-06 NOTE — LETTER
5/6/2021         RE: Lynn Thompson  [id number] [facility] [id number]        Dear Colleague,    Thank you for referring your patient, Lynn Thompson, to the [facility]. Please see a copy of my visit note below.    Lynn is a 56 year old who is being evaluated via a billable video visit.      How would you like to obtain your AVS? MyChart  If the video visit is dropped, the invitation should be resent by: Text to cell phone: [phone]  Will anyone else be joining your video visit? Ina Daily, ALTHEA on 5/6/2021 at 10:24 AM      Video Start Time: 10:53 AM  Video-Visit Details    Type of service:  Video Visit    Video End Time:11:02 AM    Originating Location (pt. 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       Lynn says that she has had anemia all of her life.  She was previously followed by Dr. Tom Malcolm at [facility] since 2004.  She was followed and treated for iron-deficiency anemia.  It was felt that it was due to poor absorption from her gastric bypass and various bowel surgeries in the past.  She was unable to tolerate oral and and could not absorb it.  She gets IV iron intermittently.  She says a round of IV iron would last her for several years.  She last saw Dr. Malcolm in 2019.      Patient has been anemic again.  She underwent colonoscopy on 4/1/21, which  found some polyps that were removed.  Due to unintentional weight loss, she underwent mammogram that was negative.  She had a CT abdomen/pelvis on 3/1/21 that was normal, other than non-specific proximal colitis.  She does not smoke.        REVIEW OF SYSTEMS:   14 point ROS was reviewed and is negative other than as noted above in HPI.       HOME MEDICATIONS:  Current Outpatient Medications   Medication Sig Dispense Refill     amylase-lipase-protease (VIOKACE) 60541 units TABS tablet Take 4-5 with meals and 2 with snacks 500 tablet 11     calcium carbonate 600 mg-vitamin D 400 units (CALTRATE) 600-400 MG-UNIT per tablet Take 1 tablet by mouth daily       Continuous Blood Gluc  (DEXCOM G6 ) MODESTA Use as directed to check blood glucose levels 1 Device 1     Continuous Blood Gluc Sensor (DEXCOM G6 SENSOR) MISC Change every 10 days 3 each 11     Continuous Blood Gluc Transmit (DEXCOM G6 TRANSMITTER) MISC Change every 3 months 1 each 3     DULoxetine (CYMBALTA) 20 MG capsule Take 20 mg by mouth daily       escitalopram (LEXAPRO) 20 MG tablet Take 20 mg by mouth daily       estradiol (ESTRACE) 0.1 MG/GM vaginal cream PLACE 2 GRAMS VAGINALLY TWICE A WEEK 42.5 g 11     famotidine (PEPCID) 40 MG tablet Take 1 tablet (40 mg) by mouth 2 times daily as needed for heartburn 180 tablet 3     gabapentin (NEURONTIN) 300 MG capsule Take 300 mg by mouth nightly as needed       glucose 40 % GEL Take 15-30 g by mouth every 15 minutes as needed. 1 Tube 11     hydrOXYzine (ATARAX) 25 MG tablet TAKE 1-2 TABLETS BY MOUTH 2 TIMES DAILY AS NEEDED FOR ANXIETY  0     insulin aspart (NOVOLOG PENFILL) 100 UNIT/ML cartridge Inject 0.5-2 units subcutaneously 4 times daily with meals and at bedtime 15 mL 11     insulin glargine (LANTUS PEN) 100 UNIT/ML pen Inject 6 units daily 15 mL 3     levothyroxine (SYNTHROID/LEVOTHROID) 100 MCG tablet Take 1 tablet (100 mcg) by mouth daily. SCHEDULE APPOINTMENT FOR FURTHER REFILLS  90 tablet 1      loratadine (CLARITIN) 10 MG tablet Take 10 mg by mouth daily as needed        modafinil (PROVIGIL) 200 MG tablet Take 400 mg by mouth daily       omeprazole (PRILOSEC) 40 MG DR capsule Take 1 capsule (40 mg) by mouth 2 times daily Take 30-60 minutes before a meal. 180 capsule 3     ondansetron (ZOFRAN-ODT) 8 MG ODT tab Take 8 mg by mouth 3 times daily as needed for nausea       STATIN NOT PRESCRIBED (INTENTIONAL) Please choose reason not prescribed from choices below.       traZODone (DESYREL) 50 MG tablet Take 50 mg by mouth nightly as needed for sleep           ALLERGIES:  Allergies   Allergen Reactions     Corticosteroids Other (See Comments)     All oral, IV and injectable steroids are contraindicated (unless in life threatening situations) in Islet Auto transplant recipients. They can cause irreversible loss of islet cell function. Please contact patient's transplant care coordinator, Erlinda Multani RN BSN at [phone]/pager: [phone] and endocrinologist prior to administration.       Povidone Iodine Hives     Causes skin to blister     Naproxen      Other reaction(s): Abdominal Pain  Pt allergic to Naprosyn     Nsaids      naprosyn = GI upset     Povidone Iodine      blisters     Sulfasalazine Nausea and Nausea and Vomiting         PAST MEDICAL HISTORY:  Past Medical History:   Diagnosis Date     Benign paroxysmal positional vertigo     occ.      Calculus of kidney 05/2005    x1 on L side passed, several stones.  Has been tested for oxalate.     Chronic abdominal pain 07/17/2013     Chronic pain      Chronic pancreatitis 07/17/2013     Depression     also occ panic spells     Depressive disorder      Diabetes (H) 5/10/2013    Total Pancreatomy with Auto Islets Transplant     Dyspepsia 06/1999    H. pylori   treated     Headaches     still periodic HA's ;  often 5X/week     Hypertension 02/22/2016    Stress related     Iron deficiency anemia secondary to inadequate dietary iron intake 11/2003     relates to gastric bypass     Post-pancreatectomy diabetes melltius 2013     Sleep apnea     uses splint     Spasm of sphincter of Oddi     [place] [facility] [place]     Thyroid nodule 2016     Vaccination not carried out          PAST SURGICAL HISTORY:  Past Surgical History:   Procedure Laterality Date     ABDOMINOPLASTY  2002    Tummy tuck     APPENDECTOMY  1990     BUNIONECTOMY Right 1998     CBD Stent placement  2002    CBD stent; Dr. Presley      SECTION       CHOLECYSTECTOMY       COLONOSCOPY   &     colonoscopy     COLONOSCOPY       COLONOSCOPY N/A 3/31/2021    Procedure: COLONOSCOPY INCOMPLETE Aborted due to incomplete prep  will need to take additional prep and return tomorrow 21;  Surgeon: Ihasn Saenz MD;  Location: [facility]     COMBINED CYSTOSCOPY, RETROGRADES, URETEROSCOPY, LASER HOLMIUM LITHOTRIPSY URETER(S), INSERT STENT Right 2015    Procedure: COMBINED CYSTOSCOPY, RETROGRADES, URETEROSCOPY, LASER HOLMIUM LITHOTRIPSY URETER(S), INSERT STENT;  Surgeon: Kennedi Aldana MD;  Location: [facility]     COMBINED CYSTOSCOPY, RETROGRADES, URETEROSCOPY, LASER HOLMIUM LITHOTRIPSY URETER(S), INSERT STENT Right 2015    Procedure: COMBINED CYSTOSCOPY, RETROGRADES, URETEROSCOPY, LASER HOLMIUM LITHOTRIPSY URETER(S), INSERT STENT;  Surgeon: Kennedi Aldana MD;  Location: [facility]     COSMETIC SURGERY  2002    Tummy tuck     CYSTECTOMY OVARIAN BENIGN Right      CYSTOSCOPY, RETROGRADES, INSERT STENT URETER(S), COMBINED  10/02/2012    Procedure: COMBINED CYSTOSCOPY, RETROGRADES, INSERT STENT URETER(S);  COMBINED CYSTOSCOPY,  , INSERT LEFT STENT URETER;  Surgeon: Johny Baez MD;  Location: [facility]     ESOPHAGOSCOPY, GASTROSCOPY, DUODENOSCOPY (EGD), COMBINED N/A 2018    Procedure: COMBINED ESOPHAGOSCOPY, GASTROSCOPY, DUODENOSCOPY (EGD);  ESOPHAGOSCOPY, GASTROSCOPY, DUODENOSCOPY (EGD)    ;  Surgeon:  Tamir Rodgers MD;  Location: [facility]     EXTRACORPOREAL SHOCK WAVE LITHOTRIPSY (ESWL)  10/16/2012    Procedure: EXTRACORPOREAL SHOCK WAVE LITHOTRIPSY (ESWL);  left EXTRACORPOREAL SHOCK WAVE LITHOTRIPSY (ESWL) ;  Surgeon: Johny Baez MD;  Location: [facility]     Gastric bypass NOS       HERNIA REPAIR  02/2015     IRRIGATION AND DEBRIDEMENT HAND, COMBINED Left 10/30/2020    Procedure: Left hand sharp excisional debridement of skin, subcutaneous tissue and fat with a scalpel, 2 x 1 x 1 cm.;  Surgeon: Demian Renteria MD;  Location: [facility]     LAP, LYSIS OF ADHESIONS       LAPAROSCOPIC LYSIS ADHESIONS N/A 02/20/2015    Procedure: LAPAROSCOPIC LYSIS ADHESIONS;  Surgeon: Aaron Early MD;  [facility]     LAPAROSCOPIC LYSIS ADHESIONS N/A 12/29/2015    Procedure: LAPAROSCOPIC LYSIS ADHESIONS;  Surgeon: Aaron Early MD;  [facility]     PANCREATECTOMY, TRANSPLANT AUTO ISLET CELL, COMBINED  05/10/2013    Procedure: COMBINED PANCREATECTOMY, TRANSPLANT AUTO ISLET CELL;  Pancreatectomy, Auto Islet Cell Transplant   hernia repair, jejunostomy tube and liver biopsies with Anesthesia General with block;  Surgeon: Aaron Early MD;  [facility]     Partial ileum resection  1992     REPAIR PTOSIS BROW BILATERAL Bilateral 06/09/2020    Procedure: BILATERAL BROW PTOSIS REPAIR;  Surgeon: Denise Alberts MD;  Location:  OR     Surgery for SBO  2015     TONSILLECTOMY, ADENOIDECTOMY, COMBINED  1997     TRANSPLANT  5/10/13    Pancreatic Auto-Islet Transplant         SOCIAL HISTORY:  Social History     Socioeconomic History     Marital status:      Spouse name: Tera     Number of children: 2     Years of education: Not on file     Highest education level: Some college, no degree   Occupational History     Occupation: customer service     Employer: [employer]   Social Needs     Financial resource strain: Hard     Food insecurity     Worry: Sometimes true     Inability: Never true     Transportation  needs     Medical: No     Non-medical: No   Tobacco Use     Smoking status: Never Smoker     Smokeless tobacco: Never Used   Substance and Sexual Activity     Alcohol use: Not Currently     Alcohol/week: 0.0 standard drinks     Frequency: Never     Drinks per session: Patient refused     Binge frequency: Never     Drug use: Not Currently     Sexual activity: Yes     Partners: Male     Birth control/protection: Post-menopausal   Lifestyle     Physical activity     Days per week: 0 days     Minutes per session: 0 min     Stress: Very much   Relationships     Social connections     Talks on phone: More than three times a week     Gets together: Once a week     Attends Alevism service: More than 4 times per year     Active member of club or organization: Yes     Attends meetings of clubs or organizations: More than 4 times per year     Relationship status:      Intimate partner violence     Fear of current or ex partner: Not on file     Emotionally abused: Not on file     Physically abused: Not on file     Forced sexual activity: Not on file   Other Topics Concern     Parent/sibling w/ CABG, MI or angioplasty before 65F 55M? No   Social History Narrative     Not on file         FAMILY HISTORY:  Family History   Problem Relation Age of Onset     Family History Negative Mother      Respiratory Father         COPD;  at 69     Genitourinary Problems Father         kidney stones     Substance Abuse Father      Depression Father      Asthma Father      Heart Disease Paternal Grandfather         M.I.     Coronary Artery Disease Paternal Grandfather      Hyperlipidemia Paternal Grandfather      Genitourinary Problems Brother         multiple brothers with kidney stones     Gastrointestinal Disease Maternal Grandmother         undiagnosed 'gut' issues     Coronary Artery Disease Maternal Grandfather      Hyperlipidemia Maternal Grandfather      Cerebrovascular Disease Paternal Grandmother         At the age of 103      Anxiety Disorder Paternal Grandmother      Osteoporosis Paternal Grandmother      Anxiety Disorder Son      Anxiety Disorder Daughter      Asthma Daughter      Colon Cancer No family hx of          PHYSICAL EXAM:  ECO  GENERAL/CONSTITUTIONAL: No acute distress. Healthy, alert.  RESPIRATORY: No audible wheeze, cough, or visible cyanosis.  No visible retractions or increased work of breathing.  Able to speak fully in complete sentences.  NEUROLOGIC: Alert, oriented, answers questions appropriately. No tremor. Mentation intact and speech normal  INTEGUMENTARY: No jaundice.    PSYCHIATRIC:  Mentation appears normal, affect normal/bright, judgement and insight intact, normal speech and appearance well-groomed.    The rest of a comprehensive physical exam is deferred due to public [facility] emergency video visit restrictions.        LABS:  CBC RESULTS:   Recent Labs   Lab Test 03/15/21  1411   WBC 7.2   RBC 3.83   HGB 10.9*   HCT 34.5*   MCV 90   MCH 28.5   MCHC 31.6   RDW 15.2*   *       Recent Labs   Lab Test 21  0208 21  0031    139   POTASSIUM 3.5 3.5   CHLORIDE 111* 107   CO2 26 28   ANIONGAP 4 4   * 120*   BUN 17 27   CR 0.85 0.95   VALARIE 8.5 9.0     Lab Results   Component Value Date    AST 18 2021     Lab Results   Component Value Date    ALT 30 2021     Lab Results   Component Value Date    BILICONJ 0.0 2014      Lab Results   Component Value Date    BILITOTAL 0.2 2021     Lab Results   Component Value Date    ALBUMIN 3.3 2021     Lab Results   Component Value Date    PROTTOTAL 6.9 2021      Lab Results   Component Value Date    ALKPHOS 116 2021     Component      Latest Ref Rng & Units 2019   Iron      35 - 180 ug/dL 73 71 38   Iron Binding Cap      240 - 430 ug/dL 264 285 365   Iron Saturation Index      15 - 46 % 28 25 10 (L)   Vitamin B12      193 - 986 pg/mL 252 290 278   Ferritin      8 - 252 ng/mL 79 30         ASSESSMENT/PLAN:  Lynn Thompson is a 56 year old female with:    1) Iron-deficiency anemia: chronic, likely related to poor absorption from her history of gastric bypass and bowel surgeries.  She had a colonoscopy on 4/1/21 that showed no bleeding.  She is unable to tolerate oral iron due to side effects and poor absorption.  She does get IV iron intermittently.    -will check new baseline CBC, CMP, ferritin, iron, vitamin B12, folate, copper, peripheral smear  -schedule Injectafer x 2 doses, a week apart  -RTC in ~4-6 months with repeat CBC, ferritin, iron    2) Rectal prolapse:  -saw ob/gyn  -she is going to see colorectal surgery     3) Unintentional weight loss: saw PCP, will also follow-up with Dr. Robles, getting some lab work-up and medication changes to see if that helps.  She had mammogram, colonoscopy, CT scan that showed no evidence of malignancy.      4) Thrombocytosis: likely reactive, due to iron deficiency  -will monitor CBC         Liliya Urrutia MD  Hematology/Oncology  [facility]    Total time spent on day of visit, including review of tests, obtaining/reviewing separately obtained history, ordering medications/tests/procedures, communicating with PCP/consultants, and documenting in electronic medical record: 30 minutes        Again, thank you for allowing me to participate in the care of your patient.        Sincerely,        Liliya Urrutia MD

## 2021-05-07 DIAGNOSIS — N95.2 VAGINAL ATROPHY: ICD-10-CM

## 2021-05-07 RX ORDER — ESTRADIOL 0.1 MG/G
CREAM VAGINAL
Qty: 42.5 G | Refills: 1 | Status: SHIPPED | OUTPATIENT
Start: 2021-05-07 | End: 2023-01-12

## 2021-05-10 ENCOUNTER — HOSPITAL ENCOUNTER (OUTPATIENT)
Facility: CLINIC | Age: 56
Setting detail: SPECIMEN
Discharge: HOME | End: 2021-05-10
Attending: INTERNAL MEDICINE | Admitting: INTERNAL MEDICINE
Payer: MEDICARE

## 2021-05-10 ENCOUNTER — INFUSION THERAPY VISIT (OUTPATIENT)
Dept: INFUSION THERAPY | Facility: CLINIC | Age: 56
End: 2021-05-10
Attending: INTERNAL MEDICINE
Payer: MEDICARE

## 2021-05-10 VITALS
HEART RATE: 71 BPM | TEMPERATURE: 98.3 F | DIASTOLIC BLOOD PRESSURE: 74 MMHG | SYSTOLIC BLOOD PRESSURE: 135 MMHG | RESPIRATION RATE: 16 BRPM | OXYGEN SATURATION: 99 %

## 2021-05-10 DIAGNOSIS — R63.30 FEEDING DIFFICULTIES: ICD-10-CM

## 2021-05-10 DIAGNOSIS — D50.8 OTHER IRON DEFICIENCY ANEMIA: Primary | ICD-10-CM

## 2021-05-10 LAB
ALBUMIN SERPL-MCNC: 3.4 G/DL (ref 3.4–5)
ALP SERPL-CCNC: 132 U/L (ref 40–150)
ALT SERPL W P-5'-P-CCNC: 35 U/L (ref 0–50)
ANION GAP SERPL CALCULATED.3IONS-SCNC: 3 MMOL/L (ref 3–14)
AST SERPL W P-5'-P-CCNC: 24 U/L (ref 0–45)
BASOPHILS # BLD AUTO: 0.1 10E9/L (ref 0–0.2)
BASOPHILS NFR BLD AUTO: 1 %
BILIRUB SERPL-MCNC: 0.3 MG/DL (ref 0.2–1.3)
BUN SERPL-MCNC: 16 MG/DL (ref 7–30)
CALCIUM SERPL-MCNC: 9 MG/DL (ref 8.5–10.1)
CHLORIDE SERPL-SCNC: 108 MMOL/L (ref 94–109)
CO2 SERPL-SCNC: 28 MMOL/L (ref 20–32)
COPATH REPORT: NORMAL
CREAT SERPL-MCNC: 0.81 MG/DL (ref 0.52–1.04)
DIFFERENTIAL METHOD BLD: ABNORMAL
EOSINOPHIL # BLD AUTO: 0.1 10E9/L (ref 0–0.7)
EOSINOPHIL NFR BLD AUTO: 1.2 %
ERYTHROCYTE [DISTWIDTH] IN BLOOD BY AUTOMATED COUNT: 15.8 % (ref 10–15)
FERRITIN SERPL-MCNC: 7 NG/ML (ref 8–252)
FOLATE SERPL-MCNC: 18.6 NG/ML
GFR SERPL CREATININE-BSD FRML MDRD: 80 ML/MIN/{1.73_M2}
GLUCOSE SERPL-MCNC: 169 MG/DL (ref 70–99)
HCT VFR BLD AUTO: 34.5 % (ref 35–47)
HGB BLD-MCNC: 10.7 G/DL (ref 11.7–15.7)
IMM GRANULOCYTES # BLD: 0 10E9/L (ref 0–0.4)
IMM GRANULOCYTES NFR BLD: 0.2 %
IRON SATN MFR SERPL: 17 % (ref 15–46)
IRON SERPL-MCNC: 65 UG/DL (ref 35–180)
LYMPHOCYTES # BLD AUTO: 1.4 10E9/L (ref 0.8–5.3)
LYMPHOCYTES NFR BLD AUTO: 22.6 %
MCH RBC QN AUTO: 28.3 PG (ref 26.5–33)
MCHC RBC AUTO-ENTMCNC: 31 G/DL (ref 31.5–36.5)
MCV RBC AUTO: 91 FL (ref 78–100)
MONOCYTES # BLD AUTO: 0.7 10E9/L (ref 0–1.3)
MONOCYTES NFR BLD AUTO: 11.1 %
NEUTROPHILS # BLD AUTO: 3.8 10E9/L (ref 1.6–8.3)
NEUTROPHILS NFR BLD AUTO: 63.9 %
NRBC # BLD AUTO: 0 10*3/UL
NRBC BLD AUTO-RTO: 0 /100
PLATELET # BLD AUTO: 404 10E9/L (ref 150–450)
POTASSIUM SERPL-SCNC: 3.6 MMOL/L (ref 3.4–5.3)
PROT SERPL-MCNC: 6.9 G/DL (ref 6.8–8.8)
RBC # BLD AUTO: 3.78 10E12/L (ref 3.8–5.2)
RETICS # AUTO: 49.5 10E9/L (ref 25–95)
RETICS/RBC NFR AUTO: 1.3 % (ref 0.5–2)
SODIUM SERPL-SCNC: 139 MMOL/L (ref 133–144)
TIBC SERPL-MCNC: 378 UG/DL (ref 240–430)
VIT B12 SERPL-MCNC: 274 PG/ML (ref 193–986)
WBC # BLD AUTO: 6 10E9/L (ref 4–11)

## 2021-05-10 PROCEDURE — 85060 BLOOD SMEAR INTERPRETATION: CPT | Performed by: PATHOLOGY

## 2021-05-10 PROCEDURE — 85045 AUTOMATED RETICULOCYTE COUNT: CPT | Performed by: INTERNAL MEDICINE

## 2021-05-10 PROCEDURE — 258N000003 HC RX IP 258 OP 636: Performed by: INTERNAL MEDICINE

## 2021-05-10 PROCEDURE — 83516 IMMUNOASSAY NONANTIBODY: CPT | Performed by: INTERNAL MEDICINE

## 2021-05-10 PROCEDURE — 83036 HEMOGLOBIN GLYCOSYLATED A1C: CPT | Performed by: INTERNAL MEDICINE

## 2021-05-10 PROCEDURE — 85025 COMPLETE CBC W/AUTO DIFF WBC: CPT | Performed by: INTERNAL MEDICINE

## 2021-05-10 PROCEDURE — 999N001109 HC STATISTIC MORPHOLOGY W/INTERP HISTOLOGY TC 85060: Performed by: INTERNAL MEDICINE

## 2021-05-10 PROCEDURE — 250N000011 HC RX IP 250 OP 636: Performed by: INTERNAL MEDICINE

## 2021-05-10 PROCEDURE — 83550 IRON BINDING TEST: CPT | Performed by: INTERNAL MEDICINE

## 2021-05-10 PROCEDURE — 82525 ASSAY OF COPPER: CPT | Performed by: INTERNAL MEDICINE

## 2021-05-10 PROCEDURE — 82607 VITAMIN B-12: CPT | Performed by: INTERNAL MEDICINE

## 2021-05-10 PROCEDURE — 36415 COLL VENOUS BLD VENIPUNCTURE: CPT

## 2021-05-10 PROCEDURE — 82746 ASSAY OF FOLIC ACID SERUM: CPT | Performed by: INTERNAL MEDICINE

## 2021-05-10 PROCEDURE — 83540 ASSAY OF IRON: CPT | Performed by: INTERNAL MEDICINE

## 2021-05-10 PROCEDURE — 96365 THER/PROPH/DIAG IV INF INIT: CPT

## 2021-05-10 PROCEDURE — 82728 ASSAY OF FERRITIN: CPT | Performed by: INTERNAL MEDICINE

## 2021-05-10 PROCEDURE — 80053 COMPREHEN METABOLIC PANEL: CPT | Performed by: INTERNAL MEDICINE

## 2021-05-10 RX ORDER — HEPARIN SODIUM,PORCINE 10 UNIT/ML
5 VIAL (ML) INTRAVENOUS
Status: CANCELLED | OUTPATIENT
Start: 2021-05-17

## 2021-05-10 RX ORDER — HEPARIN SODIUM (PORCINE) LOCK FLUSH IV SOLN 100 UNIT/ML 100 UNIT/ML
5 SOLUTION INTRAVENOUS
Status: DISCONTINUED | OUTPATIENT
Start: 2021-05-10 | End: 2021-05-10 | Stop reason: HOSPADM

## 2021-05-10 RX ORDER — HEPARIN SODIUM (PORCINE) LOCK FLUSH IV SOLN 100 UNIT/ML 100 UNIT/ML
5 SOLUTION INTRAVENOUS
Status: CANCELLED | OUTPATIENT
Start: 2021-05-17

## 2021-05-10 RX ORDER — HEPARIN SODIUM,PORCINE 10 UNIT/ML
5 VIAL (ML) INTRAVENOUS
Status: DISCONTINUED | OUTPATIENT
Start: 2021-05-10 | End: 2021-05-10 | Stop reason: HOSPADM

## 2021-05-10 RX ADMIN — FERRIC CARBOXYMALTOSE INJECTION 750 MG: 50 INJECTION, SOLUTION INTRAVENOUS at 11:39

## 2021-05-10 RX ADMIN — SODIUM CHLORIDE 250 ML: 9 INJECTION, SOLUTION INTRAVENOUS at 11:38

## 2021-05-10 NOTE — PATIENT INSTRUCTIONS
Injectafer scheduled 5/10, 5/19  Labs scheduled 9/3  Appt with Dr. Urrutia 9/9  Domonique Phelan RN on 5/10/2021 at 10:06 AM

## 2021-05-10 NOTE — PROGRESS NOTES
Infusion Nursing Note:  Lynn Thompson presents today for injectafer, labs.    Patient seen by provider today: No   present during visit today: Not Applicable.    Note: has had injectafer in past with no reactions or side effects.    Intravenous Access:  Lab draw site L AC, Needle type piv, Gauge 24.  Labs drawn without difficulty.  Peripheral IV placed.    Treatment Conditions:  Not Applicable.      Post Infusion Assessment:  Patient tolerated infusion without incident.  Patient observed for 30 minutes post injectafer per protocol.  Blood return noted pre and post infusion.  Site patent and intact, free from redness, edema or discomfort.  No evidence of extravasations.  Access discontinued per protocol.       Discharge Plan:   Discharge instructions reviewed with: Patient.  Patient and/or family verbalized understanding of discharge instructions and all questions answered.  AVS to patient via [employer].  Patient will return *5/19 for next appointment.   Patient discharged in stable condition accompanied by: self.  Departure Mode: Ambulatory.    Onelia Gore RN

## 2021-05-12 LAB — COPPER SERPL-MCNC: 105.2 UG/DL (ref 80–155)

## 2021-05-13 ENCOUNTER — VIRTUAL VISIT (OUTPATIENT)
Dept: TRANSPLANT | Facility: CLINIC | Age: 56
End: 2021-05-13
Attending: PEDIATRICS
Payer: MEDICARE

## 2021-05-13 DIAGNOSIS — R63.4 WEIGHT LOSS: ICD-10-CM

## 2021-05-13 DIAGNOSIS — Z90.410 POST-PANCREATECTOMY DIABETES (H): Primary | ICD-10-CM

## 2021-05-13 DIAGNOSIS — E89.1 POST-PANCREATECTOMY DIABETES (H): Primary | ICD-10-CM

## 2021-05-13 DIAGNOSIS — E13.9 POST-PANCREATECTOMY DIABETES (H): Primary | ICD-10-CM

## 2021-05-13 LAB — HBA1C MFR BLD: 6.8 % (ref 0–5.6)

## 2021-05-13 PROCEDURE — 99214 OFFICE O/P EST MOD 30 MIN: CPT | Mod: 95 | Performed by: PEDIATRICS

## 2021-05-13 NOTE — PROGRESS NOTES
Lynn is a 56 year old who is being evaluated via a billable video visit.      How would you like to obtain your AVS? MyChart  If the video visit is dropped, the invitation should be resent by: Text to cell phone: [phone]  Will anyone else be joining your video visit? No    Video Start Time: 3:38  Video-Visit Details    Type of service:  Video Visit    Video End Time:3:58    Originating Location (pt. Location): Home    Distant Location (provider location):  [facility]     Platform used for Video Visit: AmWell- completed by phone due to patchy connection

## 2021-05-13 NOTE — LETTER
5/13/2021         RE: Lynn Thompson  [id number] [facility] [id number]        Dear Colleague,    Thank you for referring your patient, Lynn Thompson, to the [facility]. Please see a copy of my visit note below.    Lynn is a 56 year old who is being evaluated via a billable video visit.      How would you like to obtain your AVS? MyChart  If the video visit is dropped, the invitation should be resent by: Text to cell phone: [phone]  Will anyone else be joining your video visit? No    Video Start Time: 3:38  Video-Visit Details    Type of service:  Video Visit    Video End Time:3:58    Originating Location (pt. Location): Home    Distant Location (provider location):  [facility]     Platform used for Video Visit: AmWell- completed by phone due to patchy connection       [facility]  Islet Autotransplant, Diabetes Follow Up    Problem List:  Patient Active Problem List   Diagnosis     Iron deficiency anemia secondary to inadequate dietary iron intake     Gastric bypass status for obesity     Health Care Home     Chronic pain syndrome     Exocrine pancreatic insufficiency     Asplenia     BLU (obstructive sleep apnea)     S/P exploratory laparotomy     Dysthymia     Anxiety     Thyroid nodule     Acquired hypothyroidism     Post-pancreatectomy diabetes (H)     E coli bacteremia     Antibiotic-associated diarrhea     Elevated LFTs     Fever     Dog bite, initial encounter     Cellulitis, unspecified cellulitis site     Other iron deficiency anemia       HPI:  Lynn is a 56 year old female here for follow up o total pancreatectomy, islet cell autotransplant, splenectomy, liver biopsy (needle core), choledochoduodenostomy, feeding jejunostomy performed on 5/10/2013.  At the time of the procedure, the patient received 178,100 IEQ, or 3,209 IEQ/kg body weight. She has had two subsequent exploratory laparatomy for  small bowel obstruction. Other notable endocrine history includes a complex cystic nodule in mid right thyroid lobe with 1 cm maximum diameter (saw Dr Adorno in 2016) which needs annual follow up U/S in 2017, and mild hypothyroidism followed by PCP.  She had an episode of cholangitis and was hospitalized for 4 days 17 (fevers, RUQ pain, + Ecoli blood cx).    She was on NO insulin at her 1 year, 2 year, 3 year, 4 year transplant anniversaries and restart insulin just after 4 years post-tx, insulin restart date of  2017.  She is continuing to wean narcotics, on a suboxone wean, now on a  patch (Suboxone 1- 0.5 mg film) daily, with no other narcotics.     At today's visit, overall Lynn is doing well overall.      1)  Diabetes:  Lynn continues to have partial islet graft function.  She has her Dexcom but has had problems with signal dropped on her abdomen and asks about using her arm.  The dexcom data was reviewed but it is highly patchy and I can only really view about 1 day worth.  Lynn tells me that she noted lows at night and went down on her lantus for that.  She has not noticed highs. She is overall doing really well and feeeling good.     Lantus 5 units per day  Novolog 0.5 units per 10g, and correction scale of 0.5 u per 100 >150 (3 injections per day with meals)-- total 3-5 units per day  TDD=9 units/day      Recent A1c:   Lab Results   Component Value Date    A1C 6.8 05/10/2021    A1C 6.9 2021    A1C 6.7 2020    A1C 7.1 2020    A1C 6.9 2020         Hypoglycemia history:   Recent history as above.  The patient has had NO episodes of severe hypoglycemia (seizure, coma, or neuroglycopenic symptoms severe enough to require assistance from another person).  Blood sugars were reviewed from the patient records and/or the meter download.    Average B mg/dL range  mg/dL  Number of blood sugar checks per day 2.8 in this meter-- needs strips for 4  checks per day.  Prescribed to test 4 per day or more.    Patient is good candidate for CGM therapy.  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   --  Require frequent adjustments of the insulin treatment regimen, based on therapeutic CGM test results      2)  New issues:  Continues to have weight loss.  I talked to her pcp via epic about this and we recommended that she should have a TTg assessed-- also has a son with celiac.  If there is no clear other reason for weight loss, I would like to change her enzymes.  She is on Viokace for many years due to gastric bypass but I would recommend instead a combination of Viokace and Creon.     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       Review of systems:  Review Of Systems  Skin: negative  Eyes: negative  Ears/Nose/Throat: negative  Respiratory: No shortness of breath, dyspnea on exertion, cough, or hemoptysis  Cardiovascular: negative  Gastrointestinal: as above- no recent issues  Genitourinary: negative  Musculoskeletal: negative  Neurologic: negative  Psychiatric: anxiety/depression  Hematologic/Lymphatic/Immunologic: negative  Endocrine: as above    Past Medical and Surgical History:  Past Medical History:   Diagnosis Date     Benign paroxysmal positional vertigo     occ.      Calculus of kidney 05/2005    x1 on L side passed, several stones.  Has been tested for oxalate.     Chronic abdominal pain 07/17/2013     Chronic pain      Chronic pancreatitis 07/17/2013     Depression     also occ panic  albaro     Depressive disorder      Diabetes (H) 5/10/2013    Total Pancreatomy with Auto Islets Transplant     Dyspepsia 1999    H. pylori   treated     Headaches     still periodic HA's ;  often 5X/week     Hypertension 2016    Stress related     Iron deficiency anemia secondary to inadequate dietary iron intake 2003    relates to gastric bypass     Post-pancreatectomy diabetes melltius 2013     Sleep apnea     uses splint     Spasm of sphincter of Oddi     surgical + endoscopic stenting of pancreatic duct @ [facility] 06     Thyroid nodule 2016     Vaccination not carried out      Past Surgical History:   Procedure Laterality Date     ABDOMINOPLASTY  2002    Tummy tuck     APPENDECTOMY  1990     BUNIONECTOMY Right 1998     CBD Stent placement  2002    CBD stent; Dr. Presley      SECTION       CHOLECYSTECTOMY       COLONOSCOPY   &     colonoscopy     COLONOSCOPY       COLONOSCOPY N/A 3/31/2021    Procedure: COLONOSCOPY INCOMPLETE Aborted due to incomplete prep  will need to take additional prep and return tomorrow 21;  Surgeon: Ihsan Saenz MD;  Location: [facility]     COMBINED CYSTOSCOPY, RETROGRADES, URETEROSCOPY, LASER HOLMIUM LITHOTRIPSY URETER(S), INSERT STENT Right 2015    Procedure: COMBINED CYSTOSCOPY, RETROGRADES, URETEROSCOPY, LASER HOLMIUM LITHOTRIPSY URETER(S), INSERT STENT;  Surgeon: Kennedi Aldana MD;  Location: [facility]     COMBINED CYSTOSCOPY, RETROGRADES, URETEROSCOPY, LASER HOLMIUM LITHOTRIPSY URETER(S), INSERT STENT Right 2015    Procedure: COMBINED CYSTOSCOPY, RETROGRADES, URETEROSCOPY, LASER HOLMIUM LITHOTRIPSY URETER(S), INSERT STENT;  Surgeon: Kennedi Aldana MD;  Location: [facility]     COSMETIC SURGERY  2002    Tummy tuck     CYSTECTOMY OVARIAN BENIGN Right      CYSTOSCOPY, RETROGRADES, INSERT STENT URETER(S), COMBINED  10/02/2012    Procedure: COMBINED CYSTOSCOPY, RETROGRADES, INSERT STENT URETER(S);   COMBINED CYSTOSCOPY,  , INSERT LEFT STENT URETER;  Surgeon: Johny Baez MD;  Location: [facility]     ESOPHAGOSCOPY, GASTROSCOPY, DUODENOSCOPY (EGD), COMBINED N/A 01/24/2018    Procedure: COMBINED ESOPHAGOSCOPY, GASTROSCOPY, DUODENOSCOPY (EGD);  ESOPHAGOSCOPY, GASTROSCOPY, DUODENOSCOPY (EGD)    ;  Surgeon: Tamir Rodgers MD;  Location: [facility]     EXTRACORPOREAL SHOCK WAVE LITHOTRIPSY (ESWL)  10/16/2012    Procedure: EXTRACORPOREAL SHOCK WAVE LITHOTRIPSY (ESWL);  left EXTRACORPOREAL SHOCK WAVE LITHOTRIPSY (ESWL) ;  Surgeon: Johny Baez MD;  Location: [facility]     Gastric bypass NOS       HERNIA REPAIR  02/2015     IRRIGATION AND DEBRIDEMENT HAND, COMBINED Left 10/30/2020    Procedure: Left hand sharp excisional debridement of skin, subcutaneous tissue and fat with a scalpel, 2 x 1 x 1 cm.;  Surgeon: Demian Renteria MD;  Location: [facility]     LAP, LYSIS OF ADHESIONS       LAPAROSCOPIC LYSIS ADHESIONS N/A 02/20/2015    Procedure: LAPAROSCOPIC LYSIS ADHESIONS;  Surgeon: Aaron Early MD;  [facility]     LAPAROSCOPIC LYSIS ADHESIONS N/A 12/29/2015    Procedure: LAPAROSCOPIC LYSIS ADHESIONS;  Surgeon: Aaron Early MD;  [facility]     PANCREATECTOMY, TRANSPLANT AUTO ISLET CELL, COMBINED  05/10/2013    Procedure: COMBINED PANCREATECTOMY, TRANSPLANT AUTO ISLET CELL;  Pancreatectomy, Auto Islet Cell Transplant   hernia repair, jejunostomy tube and liver biopsies with Anesthesia General with block;  Surgeon: Aaron Early MD;  [facility]     Partial ileum resection  1992     REPAIR PTOSIS BROW BILATERAL Bilateral 06/09/2020    Procedure: BILATERAL BROW PTOSIS REPAIR;  Surgeon: Denise Alberts MD;  Location:  OR     Surgery for SBO  2015     TONSILLECTOMY, ADENOIDECTOMY, COMBINED  1997     TRANSPLANT  5/10/13    Pancreatic Auto-Islet Transplant       Family History:  New changes since last visit:  none  Family History   Problem Relation Age of Onset     Family History Negative Mother       Respiratory Father         COPD;  at 69     Genitourinary Problems Father         kidney stones     Substance Abuse Father      Depression Father      Asthma Father      Heart Disease Paternal Grandfather         M.I.     Coronary Artery Disease Paternal Grandfather      Hyperlipidemia Paternal Grandfather      Genitourinary Problems Brother         multiple brothers with kidney stones     Gastrointestinal Disease Maternal Grandmother         undiagnosed 'gut' issues     Coronary Artery Disease Maternal Grandfather      Hyperlipidemia Maternal Grandfather      Cerebrovascular Disease Paternal Grandmother         At the age of 103     Anxiety Disorder Paternal Grandmother      Osteoporosis Paternal Grandmother      Anxiety Disorder Son      Anxiety Disorder Daughter      Asthma Daughter      Colon Cancer No family hx of        Social History:  Social History     Social History Narrative     Not on file     Does not work currently.  Mom to many foster dogs     Physical Exam:  Vitals: LMP 2013   BMI= There is no height or weight on file to calculate BMI.  General:  Appearance is normal, no acute distress  HEENT:  NC/AT, sclera appear white  Neck:  No obvious thyromegaly  CV/Lungs:  Non distressed breathing  Skin:  No apparent rashes.   Neuro:  Normal mental status  Psych:  Normal affect        Assessment:  1.  Post pancreatectomy diabetes mellitus, s/p total pancreatectomy and islet autotransplant.    Lynn is a 56 year old with history of chronic pancreatitis who is s/p total pancreatectomy and islet autotransplant.  She was insulin independent until 2017 (just after 4 years post-tx) and now has partial islet function.    She seems to overall be doing well but has had some technical issues with dexcom.  We will try arm site.    She does need TTg drawn for celiac screen.  If negative, I would suggest a trial of a different enzyme regimen of:   VIokace 94713 at 3 per meal + Creon 12 at 2 per meal.    And snacks w/ 1-2 of viokace + 1 creon.       Plan:  1.  Changes to current diabetes regimen:  Patient Instructions   1)  No changes to insulin dosing.    2)  Try rotating the sensor site to the arm instead to see if that helps with the signal loss.    3)  I will follow up on the Ceci prior authorization.  We need to check celiac antibodies to make sure you do not have celiac as a cause of weight loss.  If these are negative I would suggest we try to get Creon to do a mix of enzymes (Viokace + Creon) to see if this helps you with weight gain.    Follow up October 7 in person, have labs at 9:30 with boost test and then see me at 1pm.      2.  Frequency of blood sugar checks:  premeal and bedtime, and as needed for hypoglycemia (6 strip per day).    3.  Continue routine follow up for autoislet transplant patients:  Mixed meal test (6 mL/kg BoostHP to max of 360 mL) at 3 months, 6 months, and once a year post transplant.  Hemoglobin A1c levels at these time points and quarterly.    4.  Other issues addressed today:  As above    Follow up:  As above        Contact me for questions at [phone] or [phone].  Emergency number to reach pediatric endocrinology after hours is [phone].      Dr. Adriana Robles MD  [facility] [place]  Director of Research, Islet Auto-transplant Program  Phone:  [phone]  Electronically signed: May 14, 2021 at 11:09 AM        Discussion of management or test interpretation with external physician/other qualified healthcare professional/appropriate source - PCP (epic communication)  Ordering of each unique test  Prescription drug management  30 minutes spent on the date of the encounter doing chart review, history and exam, documentation and further activities per the note      Again, thank you for allowing me to participate in the care of your patient.        Sincerely,        Adriana Robles MD

## 2021-05-13 NOTE — PATIENT INSTRUCTIONS
1)  No changes to insulin dosing.    2)  Try rotating the sensor site to the arm instead to see if that helps with the signal loss.    3)  I will follow up on the Ceci prior authorization.  We need to check celiac antibodies to make sure you do not have celiac as a cause of weight loss.  If these are negative I would suggest we try to get Creon to do a mix of enzymes (Viokace + Creon) to see if this helps you with weight gain.    Follow up October 7 in person, have labs at 9:30 with boost test and then see me at 1pm.

## 2021-05-14 DIAGNOSIS — K86.89 PANCREATIC INSUFFICIENCY: ICD-10-CM

## 2021-05-14 LAB — TTG IGA SER-ACNC: 1 U/ML

## 2021-05-14 NOTE — PROGRESS NOTES
[facility]  Islet Autotransplant, Diabetes Follow Up    Problem List:  Patient Active Problem List   Diagnosis     Iron deficiency anemia secondary to inadequate dietary iron intake     Gastric bypass status for obesity     Health Care Home     Chronic pain syndrome     Exocrine pancreatic insufficiency     Asplenia     BLU (obstructive sleep apnea)     S/P exploratory laparotomy     Dysthymia     Anxiety     Thyroid nodule     Acquired hypothyroidism     Post-pancreatectomy diabetes (H)     E coli bacteremia     Antibiotic-associated diarrhea     Elevated LFTs     Fever     Dog bite, initial encounter     Cellulitis, unspecified cellulitis site     Other iron deficiency anemia       HPI:  Lynn is a 56 year old female here for follow up o total pancreatectomy, islet cell autotransplant, splenectomy, liver biopsy (needle core), choledochoduodenostomy, feeding jejunostomy performed on 5/10/2013.  At the time of the procedure, the patient received 178,100 IEQ, or 3,209 IEQ/kg body weight. She has had two subsequent exploratory laparatomy for small bowel obstruction. Other notable endocrine history includes a complex cystic nodule in mid right thyroid lobe with 1 cm maximum diameter (saw Dr Adorno in 11/2016) which needs annual follow up U/S in Nov 2017, and mild hypothyroidism followed by PCP.  She had an episode of cholangitis and was hospitalized for 4 days 8/24/17 (fevers, RUQ pain, + Ecoli blood cx).    She was on NO insulin at her 1 year, 2 year, 3 year, 4 year transplant anniversaries and restart insulin just after 4 years post-tx, insulin restart date of  6/6/2017.  She is continuing to wean narcotics, on a suboxone wean, now on a 1/8 patch (Suboxone 1- 0.5 mg film) daily, with no other narcotics.     At today's visit, overall Lynn is doing well overall.      1)  Diabetes:  Lynn continues to have partial islet graft function.  She has her Dexcom but has had problems  with signal dropped on her abdomen and asks about using her arm.  The dexcom data was reviewed but it is highly patchy and I can only really view about 1 day worth.  Lynn tells me that she noted lows at night and went down on her lantus for that.  She has not noticed highs. She is overall doing really well and feeeling good.     Lantus 5 units per day  Novolog 0.5 units per 10g, and correction scale of 0.5 u per 100 >150 (3 injections per day with meals)-- total 3-5 units per day  TDD=9 units/day      Recent A1c:   Lab Results   Component Value Date    A1C 6.8 05/10/2021    A1C 6.9 2021    A1C 6.7 2020    A1C 7.1 2020    A1C 6.9 2020         Hypoglycemia history:   Recent history as above.  The patient has had NO episodes of severe hypoglycemia (seizure, coma, or neuroglycopenic symptoms severe enough to require assistance from another person).  Blood sugars were reviewed from the patient records and/or the meter download.    Average B mg/dL range  mg/dL  Number of blood sugar checks per day 2.8 in this meter-- needs strips for 4 checks per day.  Prescribed to test 4 per day or more.    Patient is good candidate for CGM therapy.  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   --  Require frequent adjustments of the insulin treatment regimen, based on therapeutic CGM test results      2)  New issues:  Continues to have weight loss.  I talked to her pcp via epic about this and we recommended that she should have a TTg assessed-- also has a son with celiac.  If there is no clear other  reason for weight loss, I would like to change her enzymes.  She is on Viokace for many years due to gastric bypass but I would recommend instead a combination of Viokace and Creon.     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       Review of systems:  Review Of Systems  Skin: negative  Eyes: negative  Ears/Nose/Throat: negative  Respiratory: No shortness of breath, dyspnea on exertion, cough, or hemoptysis  Cardiovascular: negative  Gastrointestinal: as above- no recent issues  Genitourinary: negative  Musculoskeletal: negative  Neurologic: negative  Psychiatric: anxiety/depression  Hematologic/Lymphatic/Immunologic: negative  Endocrine: as above    Past Medical and Surgical History:  Past Medical History:   Diagnosis Date     Benign paroxysmal positional vertigo     occ.      Calculus of kidney 05/2005    x1 on L side passed, several stones.  Has been tested for oxalate.     Chronic abdominal pain 2013     Chronic pain      Chronic pancreatitis 2013     Depression     also occ panic spells     Depressive disorder      Diabetes (H) 5/10/2013    Total Pancreatomy with Auto Islets Transplant     Dyspepsia 1999    H. pylori   treated     Headaches     still periodic HA's ;  often 5X/week     Hypertension 2016    Stress related     Iron deficiency anemia secondary to inadequate dietary iron intake 2003    relates to gastric bypass     Post-pancreatectomy diabetes melltius 2013     Sleep apnea     uses splint     Spasm of sphincter of Oddi     surgical + endoscopic stenting of pancreatic duct [place] [facility] [place]     Thyroid nodule 2016     Vaccination not carried out      Past Surgical History:   Procedure Laterality Date     ABDOMINOPLASTY  2002    Tummy tuck     APPENDECTOMY  1990     BUNIONECTOMY Right 1998     CBD Stent placement  2002    CBD stent; Dr. Presley      SECTION        CHOLECYSTECTOMY       COLONOSCOPY  6/02 & 12/05    colonoscopy     COLONOSCOPY       COLONOSCOPY N/A 3/31/2021    Procedure: COLONOSCOPY INCOMPLETE Aborted due to incomplete prep  will need to take additional prep and return tomorrow 4/1/21;  Surgeon: Ihsan Saenz MD;  Location: [facility]     COMBINED CYSTOSCOPY, RETROGRADES, URETEROSCOPY, LASER HOLMIUM LITHOTRIPSY URETER(S), INSERT STENT Right 03/23/2015    Procedure: COMBINED CYSTOSCOPY, RETROGRADES, URETEROSCOPY, LASER HOLMIUM LITHOTRIPSY URETER(S), INSERT STENT;  Surgeon: Kennedi Aldana MD;  Location: [facility]     COMBINED CYSTOSCOPY, RETROGRADES, URETEROSCOPY, LASER HOLMIUM LITHOTRIPSY URETER(S), INSERT STENT Right 04/20/2015    Procedure: COMBINED CYSTOSCOPY, RETROGRADES, URETEROSCOPY, LASER HOLMIUM LITHOTRIPSY URETER(S), INSERT STENT;  Surgeon: Kennedi Aldana MD;  Location: [facility]     COSMETIC SURGERY  6/24/2002    Tummy tuck     CYSTECTOMY OVARIAN BENIGN Right      CYSTOSCOPY, RETROGRADES, INSERT STENT URETER(S), COMBINED  10/02/2012    Procedure: COMBINED CYSTOSCOPY, RETROGRADES, INSERT STENT URETER(S);  COMBINED CYSTOSCOPY,  , INSERT LEFT STENT URETER;  Surgeon: Johny Baez MD;  Location: [facility]     ESOPHAGOSCOPY, GASTROSCOPY, DUODENOSCOPY (EGD), COMBINED N/A 01/24/2018    Procedure: COMBINED ESOPHAGOSCOPY, GASTROSCOPY, DUODENOSCOPY (EGD);  ESOPHAGOSCOPY, GASTROSCOPY, DUODENOSCOPY (EGD)    ;  Surgeon: Tamir Rodgers MD;  Location: [facility]     EXTRACORPOREAL SHOCK WAVE LITHOTRIPSY (ESWL)  10/16/2012    Procedure: EXTRACORPOREAL SHOCK WAVE LITHOTRIPSY (ESWL);  left EXTRACORPOREAL SHOCK WAVE LITHOTRIPSY (ESWL) ;  Surgeon: Johny Baez MD;  Location: [facility]     Gastric bypass NOS       HERNIA REPAIR  02/2015     IRRIGATION AND DEBRIDEMENT HAND, COMBINED Left 10/30/2020    Procedure: Left hand sharp excisional debridement of skin, subcutaneous tissue and fat with a scalpel, 2 x 1 x 1 cm.;  Surgeon: Demian Renteria MD;  Location:  [facility]     LAP, LYSIS OF ADHESIONS       LAPAROSCOPIC LYSIS ADHESIONS N/A 2015    Procedure: LAPAROSCOPIC LYSIS ADHESIONS;  Surgeon: Aaron Early MD;  [facility]     LAPAROSCOPIC LYSIS ADHESIONS N/A 2015    Procedure: LAPAROSCOPIC LYSIS ADHESIONS;  Surgeon: Aaron Early MD;  [facility]     PANCREATECTOMY, TRANSPLANT AUTO ISLET CELL, COMBINED  05/10/2013    Procedure: COMBINED PANCREATECTOMY, TRANSPLANT AUTO ISLET CELL;  Pancreatectomy, Auto Islet Cell Transplant   hernia repair, jejunostomy tube and liver biopsies with Anesthesia General with block;  Surgeon: Aaron Early MD;  [facility]     Partial ileum resection       REPAIR PTOSIS BROW BILATERAL Bilateral 2020    Procedure: BILATERAL BROW PTOSIS REPAIR;  Surgeon: Denise Alberts MD;  Location:  OR     Surgery for SBO       TONSILLECTOMY, ADENOIDECTOMY, COMBINED  1997     TRANSPLANT  5/10/13    Pancreatic Auto-Islet Transplant       Family History:  New changes since last visit:  none  Family History   Problem Relation Age of Onset     Family History Negative Mother      Respiratory Father         COPD;  at 69     Genitourinary Problems Father         kidney stones     Substance Abuse Father      Depression Father      Asthma Father      Heart Disease Paternal Grandfather         M.I.     Coronary Artery Disease Paternal Grandfather      Hyperlipidemia Paternal Grandfather      Genitourinary Problems Brother         multiple brothers with kidney stones     Gastrointestinal Disease Maternal Grandmother         undiagnosed 'gut' issues     Coronary Artery Disease Maternal Grandfather      Hyperlipidemia Maternal Grandfather      Cerebrovascular Disease Paternal Grandmother         At the age of 103     Anxiety Disorder Paternal Grandmother      Osteoporosis Paternal Grandmother      Anxiety Disorder Son      Anxiety Disorder Daughter      Asthma Daughter      Colon Cancer No family hx of        Social  History:  Social History     Social History Narrative     Not on file     Does not work currently.  Mom to many foster dogs     Physical Exam:  Vitals: LMP 12/19/2013   BMI= There is no height or weight on file to calculate BMI.  General:  Appearance is normal, no acute distress  HEENT:  NC/AT, sclera appear white  Neck:  No obvious thyromegaly  CV/Lungs:  Non distressed breathing  Skin:  No apparent rashes.   Neuro:  Normal mental status  Psych:  Normal affect        Assessment:  1.  Post pancreatectomy diabetes mellitus, s/p total pancreatectomy and islet autotransplant.    Lynn is a 56 year old with history of chronic pancreatitis who is s/p total pancreatectomy and islet autotransplant.  She was insulin independent until 6/6/2017 (just after 4 years post-tx) and now has partial islet function.    She seems to overall be doing well but has had some technical issues with dexcom.  We will try arm site.    She does need TTg drawn for celiac screen.  If negative, I would suggest a trial of a different enzyme regimen of:   VIokace 29240 at 3 per meal + Creon 12 at 2 per meal.   And snacks w/ 1-2 of viokace + 1 creon.       Plan:  1.  Changes to current diabetes regimen:  Patient Instructions   1)  No changes to insulin dosing.    2)  Try rotating the sensor site to the arm instead to see if that helps with the signal loss.    3)  I will follow up on the Katie prior authorization.  We need to check celiac antibodies to make sure you do not have celiac as a cause of weight loss.  If these are negative I would suggest we try to get Creon to do a mix of enzymes (Viokace + Creon) to see if this helps you with weight gain.    Follow up October 7 in person, have labs at 9:30 with boost test and then see me at 1pm.      2.  Frequency of blood sugar checks:  premeal and bedtime, and as needed for hypoglycemia (6 strip per day).    3.  Continue routine follow up for autoislet transplant patients:  Mixed meal test (6 mL/kg  BoostHP to max of 360 mL) at 3 months, 6 months, and once a year post transplant.  Hemoglobin A1c levels at these time points and quarterly.    4.  Other issues addressed today:  As above    Follow up:  As above        Contact me for questions at [phone] or [phone].  Emergency number to reach pediatric endocrinology after hours is [phone].      Dr. Adriana Robles MD  General acute hospital Diabetes [place]  Director of Research, Islet Auto-transplant Program  Phone:  [phone]  Electronically signed: May 14, 2021 at 11:09 AM        Discussion of management or test interpretation with external physician/other qualified healthcare professional/appropriate source - PCP (epic communication)  Ordering of each unique test  Prescription drug management  30 minutes spent on the date of the encounter doing chart review, history and exam, documentation and further activities per the note

## 2021-05-17 ENCOUNTER — DOCUMENTATION ONLY (OUTPATIENT)
Dept: TRANSPLANT | Facility: CLINIC | Age: 56
End: 2021-05-17

## 2021-05-17 NOTE — PROGRESS NOTES
Attempted prior auth for Viokace today with [employer]. Viokace is not covered by formulary. Submitted coverage exception form to [employer]: [phone] with request for urgent review at 5:09 PM on 05/17/21.     Erlinda Multani RN Transplant Coordinator on May 17, 2021 5:10 PM

## 2021-05-19 ENCOUNTER — INFUSION THERAPY VISIT (OUTPATIENT)
Dept: INFUSION THERAPY | Facility: CLINIC | Age: 56
End: 2021-05-19
Attending: INTERNAL MEDICINE
Payer: MEDICARE

## 2021-05-19 VITALS
HEART RATE: 85 BPM | TEMPERATURE: 97.4 F | RESPIRATION RATE: 16 BRPM | DIASTOLIC BLOOD PRESSURE: 75 MMHG | SYSTOLIC BLOOD PRESSURE: 128 MMHG | OXYGEN SATURATION: 100 %

## 2021-05-19 DIAGNOSIS — D50.8 OTHER IRON DEFICIENCY ANEMIA: Primary | ICD-10-CM

## 2021-05-19 PROCEDURE — 96365 THER/PROPH/DIAG IV INF INIT: CPT

## 2021-05-19 PROCEDURE — 250N000011 HC RX IP 250 OP 636: Performed by: INTERNAL MEDICINE

## 2021-05-19 PROCEDURE — 258N000003 HC RX IP 258 OP 636: Performed by: INTERNAL MEDICINE

## 2021-05-19 RX ORDER — HEPARIN SODIUM (PORCINE) LOCK FLUSH IV SOLN 100 UNIT/ML 100 UNIT/ML
5 SOLUTION INTRAVENOUS
Status: CANCELLED | OUTPATIENT
Start: 2021-05-24

## 2021-05-19 RX ORDER — HEPARIN SODIUM,PORCINE 10 UNIT/ML
5 VIAL (ML) INTRAVENOUS
Status: CANCELLED | OUTPATIENT
Start: 2021-05-24

## 2021-05-19 RX ADMIN — FERRIC CARBOXYMALTOSE INJECTION 750 MG: 50 INJECTION, SOLUTION INTRAVENOUS at 08:22

## 2021-05-19 NOTE — PROGRESS NOTES
Infusion Nursing Note:  Lynn Thompson presents today for injectafer.    Patient seen by provider today: No   present during visit today: Not Applicable.    Note: N/A.  Patient did meet criteria for an asymptomatic covid-19 PCR test in infusion today. Patient declined the covid-19 test.    Intravenous Access:  Peripheral IV placed.    Treatment Conditions:  Not Applicable.      Post Infusion Assessment:  Patient tolerated infusion without incident.  Patient observed for 30 minutes post Injectafer per protocol.  Blood return noted pre and post infusion.  Site patent and intact, free from redness, edema or discomfort.  No evidence of extravasations.  Access discontinued per protocol.       Discharge Plan:   Discharge instructions reviewed with: Patient.  Patient and/or family verbalized understanding of discharge instructions and all questions answered.  AVS to patient via [employer].  Patient will return 9/3/2021 for next appointment.   Patient discharged in stable condition accompanied by: self.  Departure Mode: Ambulatory.    Megan Garcia RN

## 2021-06-11 ENCOUNTER — TELEPHONE (OUTPATIENT)
Dept: FAMILY MEDICINE | Facility: CLINIC | Age: 56
End: 2021-06-11

## 2021-06-11 NOTE — TELEPHONE ENCOUNTER
Central Prior Authorization Team   Phone: [phone]    PA Initiation    Medication: omeprazole (PRILOSEC) 40 MG DR capsule  Insurance Company: [facility] - Phone [phone] Fax [phone]  Pharmacy Filling the Rx: [id number] [place], [place] - [address] [address]  Filling Pharmacy Phone: [phone]  Filling Pharmacy Fax: [phone]  Start Date: 6/11/2021

## 2021-06-11 NOTE — TELEPHONE ENCOUNTER
Prior Authorization Approval    Authorization Effective Date: 6/10/2021  Authorization Expiration Date: 6/10/2022  Medication: omeprazole (PRILOSEC) 40 MG-APPROVED  Approved Dose/Quantity:    Reference #:     Insurance Company: [employer] [place] - Phone [phone] Fax [phone]  Expected CoPay:       CoPay Card Available:      Foundation Assistance Needed:    Which Pharmacy is filling the prescription (Not needed for infusion/clinic administered): [id number] - [place], [place] - [address] [address]  Pharmacy Notified: Yes  Patient Notified: Yes  **Instructed pharmacy to notify patient when script is ready to /ship.**

## 2021-06-11 NOTE — TELEPHONE ENCOUNTER
Prior Authorization Retail Medication Request    Medication/Dose: omeprazole (PRILOSEC) 40 MG DR capsule  ICD code (if different than what is on RX):    Previously Tried and Failed:  PRILOSEC 40 MG OR CPDR,  NEXIUM 40 MG OR CPDR   Rationale:  Renewal    Prior Authorization Approval     Authorization Effective Date: 2019  Authorization Expiration Date: 2020  Medication: omeprazole (PRILOSEC) 40 MG DR-APPROVED  Approved Dose/Quantity:    Reference #:     Insurance Company: [employer] [place] - Phone [phone] Fax [phone]  Expected CoPay:       CoPay Card Available:      Foundation Assistance Needed:    Which Pharmacy is filling the prescription (Not needed for infusion/clinic administered): [id number] - [place], [place] - [address] [address]  Pharmacy Notified: Yes  Patient Notified: Yes  **Instructed pharmacy to notify patient when script is ready to /ship.**               [employer]    KEY: [id number]  PATIENT LAST NAME: JAYDEN  : 1965      Pharmacy Information (if different than what is on RX)  Name:    Phone:        Edy DHILLON

## 2021-06-17 ENCOUNTER — TRANSFERRED RECORDS (OUTPATIENT)
Dept: HEALTH INFORMATION MANAGEMENT | Facility: CLINIC | Age: 56
End: 2021-06-17

## 2021-06-17 LAB — RETINOPATHY: NEGATIVE

## 2021-06-22 ASSESSMENT — ENCOUNTER SYMPTOMS
DECREASED APPETITE: 1
BRUISES/BLEEDS EASILY: 0
INCREASED ENERGY: 1
NIGHT SWEATS: 0
HEARTBURN: 0
JAUNDICE: 0
POLYDIPSIA: 0
NAUSEA: 1
BACK PAIN: 0
WEIGHT GAIN: 0
MUSCLE WEAKNESS: 1
CONSTIPATION: 0
MYALGIAS: 1
PANIC: 1
POLYPHAGIA: 0
CHILLS: 0
BOWEL INCONTINENCE: 1
JOINT SWELLING: 0
DIARRHEA: 1
SWOLLEN GLANDS: 0
HALLUCINATIONS: 0
DEPRESSION: 1
STIFFNESS: 1
FEVER: 0
MUSCLE CRAMPS: 0
ABDOMINAL PAIN: 1
BLOOD IN STOOL: 0
BLOATING: 1
FATIGUE: 1
ALTERED TEMPERATURE REGULATION: 0
WEIGHT LOSS: 1
NECK PAIN: 1
DECREASED CONCENTRATION: 1
INSOMNIA: 1
VOMITING: 1
ARTHRALGIAS: 1
RECTAL PAIN: 1
NERVOUS/ANXIOUS: 1

## 2021-06-25 ENCOUNTER — OFFICE VISIT (OUTPATIENT)
Dept: SURGERY | Facility: CLINIC | Age: 56
End: 2021-06-25
Attending: FAMILY MEDICINE
Payer: MEDICARE

## 2021-06-25 ENCOUNTER — PRE VISIT (OUTPATIENT)
Dept: SURGERY | Facility: CLINIC | Age: 56
End: 2021-06-25

## 2021-06-25 VITALS
BODY MASS INDEX: 19.72 KG/M2 | HEART RATE: 87 BPM | OXYGEN SATURATION: 100 % | HEIGHT: 64 IN | SYSTOLIC BLOOD PRESSURE: 139 MMHG | WEIGHT: 115.5 LBS | DIASTOLIC BLOOD PRESSURE: 72 MMHG

## 2021-06-25 DIAGNOSIS — K62.3 RECTAL PROLAPSE: Primary | ICD-10-CM

## 2021-06-25 PROCEDURE — 99203 OFFICE O/P NEW LOW 30 MIN: CPT | Performed by: NURSE PRACTITIONER

## 2021-06-25 ASSESSMENT — MIFFLIN-ST. JEOR: SCORE: 1088.9

## 2021-06-25 ASSESSMENT — PAIN SCALES - GENERAL: PAINLEVEL: NO PAIN (0)

## 2021-06-25 NOTE — NURSING NOTE
"Chief Complaint   Patient presents with     Consult     rectal prolapse       Vitals:    06/25/21 1048   BP: 139/72   BP Location: Left arm   Patient Position: Sitting   Cuff Size: Adult Regular   Pulse: 87   SpO2: 100%   Weight: 115 lb 8 oz   Height: 5' 4\"       Body mass index is 19.83 kg/m .    Sully Phelan CMA    "

## 2021-06-25 NOTE — PROGRESS NOTES
"Colon and Rectal Surgery Consult Clinic Note    Date: 2021     Referring provider:  Maryana Brooks MD  [address] [place] [address]     RE: Lynn Thompson  : 1965  FAUSTO: 2021    Lynn Thompson is a very pleasant 56 year old female with complex past medical and surgical history who presents today for rectal prolapse.    HPI:  Lynn has a history of total pancreatectomy with islet L autotransplant in , splenectomy, choledochoduodenostomy, gastric bypass, appendectomy, ileal resection in  for Crohn's disease, hernia repair, lysis of adhesions x2, and a small bowel obstruction in .  She reports that her Crohn's has been in remission for over 20 years and she has not needed to be on any medications for this.  About a year ago she had significant constipation and thinks she developed a rectal prolapse at that time.  This has been coming out about once every week to 2 weeks but lasts about 3 days.  She had difficulty getting it back in last week.  She has discomfort but no significant pain when it is out.  She is starting to have difficulty holding bowel movements.  She is leaking loose stools.  She provides several very good pictures of a significant full-thickness rectal prolapse.  She has never had any anorectal surgeries in the past.  She has had 1 vaginal birth of a 4 pound 3 ounce baby and did have an episiotomy.  She denies any urinary incontinence or vaginal or uterine prolapse symptoms.  She had a colonoscopy on 2021 with hyperplastic polyps only.    Physical Examination:  /72 (BP Location: Left arm, Patient Position: Sitting, Cuff Size: Adult Regular)   Pulse 87   Ht 5' 4\"   Wt 115 lb 8 oz   LMP 2013   SpO2 100%   BMI 19.83 kg/m    General: Alert, oriented, in no acute distress, sitting comfortably    Assessment/Plan: 56 year old female with complex past surgical and medical history with full-thickness rectal prolapse.  She provides " several very good photographs today of a significant full-thickness rectal prolapse.  She had a recent, normal colonoscopy.  It would like to get defecography at the pelvic floor center and then have her return to clinic to meet with Dr. Sheridan to discuss surgical options in the setting of a complex past surgical history.  We did discuss that her fecal incontinence may or may not be improved by repair of the prolapse.  She reports that the incontinence is fairly minimal and is only started to occur recently.  We will set her up to meet with Dr. Sheridan once she is scheduled for defecography. Patient's questions were answered to her stated satisfaction and she is in agreement with this plan.    Medical history:  Past Medical History:   Diagnosis Date     Benign paroxysmal positional vertigo     occ.      Calculus of kidney 05/2005    x1 on L side passed, several stones.  Has been tested for oxalate.     Chronic abdominal pain 2013     Chronic pain      Chronic pancreatitis 2013     Depression     also occ panic spells     Depressive disorder      Diabetes (H) 5/10/2013    Total Pancreatomy with Auto Islets Transplant     Dyspepsia 1999    H. pylori   treated     Headaches     still periodic HA's ;  often 5X/week     Hypertension 2016    Stress related     Iron deficiency anemia secondary to inadequate dietary iron intake 2003    relates to gastric bypass     Post-pancreatectomy diabetes melltius 2013     Sleep apnea     uses splint     Spasm of sphincter of Oddi     surgical + endoscopic stenting of pancreatic duct @ [facility] 06     Thyroid nodule 2016     Vaccination not carried out        Surgical history:  Past Surgical History:   Procedure Laterality Date     ABDOMINOPLASTY  2002    Tummy tuck     APPENDECTOMY       BUNIONECTOMY Right 1998     CBD Stent placement  2002    CBD stent; Dr. Presley      SECTION       CHOLECYSTECTOMY       COLONOSCOPY   6/02 & 12/05    colonoscopy     COLONOSCOPY       COLONOSCOPY N/A 3/31/2021    Procedure: COLONOSCOPY INCOMPLETE Aborted due to incomplete prep  will need to take additional prep and return tomorrow 4/1/21;  Surgeon: Ihsan Saenz MD;  Location: [facility]     COMBINED CYSTOSCOPY, RETROGRADES, URETEROSCOPY, LASER HOLMIUM LITHOTRIPSY URETER(S), INSERT STENT Right 03/23/2015    Procedure: COMBINED CYSTOSCOPY, RETROGRADES, URETEROSCOPY, LASER HOLMIUM LITHOTRIPSY URETER(S), INSERT STENT;  Surgeon: Kennedi Aldana MD;  Location: [facility]     COMBINED CYSTOSCOPY, RETROGRADES, URETEROSCOPY, LASER HOLMIUM LITHOTRIPSY URETER(S), INSERT STENT Right 04/20/2015    Procedure: COMBINED CYSTOSCOPY, RETROGRADES, URETEROSCOPY, LASER HOLMIUM LITHOTRIPSY URETER(S), INSERT STENT;  Surgeon: Kennedi Aldana MD;  Location: [facility]     COSMETIC SURGERY  6/24/2002    Tummy tuck     CYSTECTOMY OVARIAN BENIGN Right      CYSTOSCOPY, RETROGRADES, INSERT STENT URETER(S), COMBINED  10/02/2012    Procedure: COMBINED CYSTOSCOPY, RETROGRADES, INSERT STENT URETER(S);  COMBINED CYSTOSCOPY,  , INSERT LEFT STENT URETER;  Surgeon: Johny Baez MD;  Location: [facility]     ESOPHAGOSCOPY, GASTROSCOPY, DUODENOSCOPY (EGD), COMBINED N/A 01/24/2018    Procedure: COMBINED ESOPHAGOSCOPY, GASTROSCOPY, DUODENOSCOPY (EGD);  ESOPHAGOSCOPY, GASTROSCOPY, DUODENOSCOPY (EGD)    ;  Surgeon: Tamir Rodgers MD;  Location: [facility]     EXTRACORPOREAL SHOCK WAVE LITHOTRIPSY (ESWL)  10/16/2012    Procedure: EXTRACORPOREAL SHOCK WAVE LITHOTRIPSY (ESWL);  left EXTRACORPOREAL SHOCK WAVE LITHOTRIPSY (ESWL) ;  Surgeon: Johny Baez MD;  Location: [facility]     Gastric bypass NOS       HERNIA REPAIR  02/2015     IRRIGATION AND DEBRIDEMENT HAND, COMBINED Left 10/30/2020    Procedure: Left hand sharp excisional debridement of skin, subcutaneous tissue and fat with a scalpel, 2 x 1 x 1 cm.;  Surgeon: Demian Renteria MD;  Location: [facility]     LAP, LYSIS OF ADHESIONS        LAPAROSCOPIC LYSIS ADHESIONS N/A 02/20/2015    Procedure: LAPAROSCOPIC LYSIS ADHESIONS;  Surgeon: Aaron Early MD;  [facility]     LAPAROSCOPIC LYSIS ADHESIONS N/A 12/29/2015    Procedure: LAPAROSCOPIC LYSIS ADHESIONS;  Surgeon: Aaron Early MD;  [facility]     PANCREATECTOMY, TRANSPLANT AUTO ISLET CELL, COMBINED  05/10/2013    Procedure: COMBINED PANCREATECTOMY, TRANSPLANT AUTO ISLET CELL;  Pancreatectomy, Auto Islet Cell Transplant   hernia repair, jejunostomy tube and liver biopsies with Anesthesia General with block;  Surgeon: Aaron Early MD;  [facility]: [facility]     Partial ileum resection  1992     REPAIR PTOSIS BROW BILATERAL Bilateral 06/09/2020    Procedure: BILATERAL BROW PTOSIS REPAIR;  Surgeon: Denise Alberts MD;  Location:  OR     Surgery for SBO  2015     TONSILLECTOMY, ADENOIDECTOMY, COMBINED  1997     TRANSPLANT  5/10/13    Pancreatic Auto-Islet Transplant       Problem list:  Patient Active Problem List    Diagnosis Date Noted     Health Care Home 05/03/2011     Priority: High     EMERGENCY CARE PLAN  Presenting Problem Signs and Symptoms Treatment Plan    Questions or conerns during clinic hours    I will call the clinic directly     Questions or conerns outside clinic hours    I will call the 24 hour nurse line at [phone]    Patient needs to schedule an appointment    I will call the 24 hour scheduling team at [phone] or clinic directly    Same day treatment     I will call the clinic first, nurse line if after hours, urgent care and express care if needed                            DX V65.8 REPLACED WITH [id number] [facility] (04/08/2013)       Other iron deficiency anemia 03/15/2021     Priority: Medium     Dog bite, initial encounter 10/29/2020     Priority: Medium     Cellulitis, unspecified cellulitis site 10/29/2020     Priority: Medium     Fever 06/11/2020     Priority: Medium     Antibiotic-associated diarrhea 08/28/2017     Priority: Medium      Elevated LFTs 08/28/2017     Priority: Medium     E coli bacteremia 08/25/2017     Priority: Medium     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     Priority: Medium     Acquired hypothyroidism 11/03/2016     Priority: Medium     Thyroid nodule 11/01/2016     Priority: Medium     Dysthymia 03/01/2016     Priority: Medium     Anxiety 03/01/2016     Priority: Medium     S/P exploratory laparotomy 12/29/2015     Priority: Medium     BLU (obstructive sleep apnea) 12/23/2015     Priority: Medium     Asplenia 10/24/2014     Priority: Medium     Exocrine pancreatic insufficiency 05/17/2013     Priority: Medium     Chronic pain syndrome 02/23/2013     Priority: Medium     Gastric bypass status for obesity 10/26/2010     Priority: Medium     Iron deficiency anemia secondary to inadequate dietary iron intake 12/28/2004     Priority: Medium     relates to gastric bypass         Medications:  Current Outpatient Medications   Medication Sig Dispense Refill     amylase-lipase-protease (VIOKACE) 18629-14824 units TABS tablet Take 4-5 with meals and 2 with snacks 500 tablet 11     calcium carbonate 600 mg-vitamin D 400 units (CALTRATE) 600-400 MG-UNIT per tablet Take 1 tablet by mouth daily       Continuous Blood Gluc  (DEXCOM G6 ) MODESTA Use as directed to check blood glucose levels 1 Device 1     Continuous Blood Gluc Sensor (DEXCOM G6 SENSOR) MISC Change every 10 days 3 each 11     Continuous Blood Gluc Transmit (DEXCOM G6 TRANSMITTER) MISC Change every 3 months 1 each 3     DULoxetine (CYMBALTA) 20 MG capsule Take 20 mg by mouth daily       escitalopram (LEXAPRO) 20 MG tablet Take 20 mg by mouth daily       estradiol (ESTRACE) 0.1 MG/GM vaginal cream PLACE 2 GRAMS VAGINALLY 2 TIMES WEEKLY 42.5 g 1     famotidine (PEPCID) 40 MG tablet Take 1 tablet (40 mg) by mouth 2 times daily as needed for heartburn 180 tablet 3     gabapentin (NEURONTIN) 300 MG capsule Take 300 mg by mouth nightly as needed       glucose 40  % GEL Take 15-30 g by mouth every 15 minutes as needed. 1 Tube 11     hydrOXYzine (ATARAX) 25 MG tablet TAKE 1-2 TABLETS BY MOUTH 2 TIMES DAILY AS NEEDED FOR ANXIETY  0     insulin aspart (NOVOLOG PENFILL) 100 UNIT/ML cartridge Inject 0.5-2 units subcutaneously 4 times daily with meals and at bedtime 15 mL 11     insulin glargine (LANTUS PEN) 100 UNIT/ML pen Inject 6 units daily 15 mL 3     levothyroxine (SYNTHROID/LEVOTHROID) 100 MCG tablet TAKE ONE TABLET BY MOUTH DAILY. APPOINTMENT NEEDED FOR FURTHER REFILLS. 90 tablet 0     loratadine (CLARITIN) 10 MG tablet Take 10 mg by mouth daily as needed        modafinil (PROVIGIL) 200 MG tablet Take 400 mg by mouth daily       omeprazole (PRILOSEC) 40 MG DR capsule Take 1 capsule (40 mg) by mouth 2 times daily Take 30-60 minutes before a meal. 180 capsule 3     ondansetron (ZOFRAN-ODT) 8 MG ODT tab Take 8 mg by mouth 3 times daily as needed for nausea       STATIN NOT PRESCRIBED (INTENTIONAL) Please choose reason not prescribed from choices below.       traZODone (DESYREL) 50 MG tablet Take 50 mg by mouth nightly as needed for sleep         Allergies:  Allergies   Allergen Reactions     Corticosteroids Other (See Comments)     All oral, IV and injectable steroids are contraindicated (unless in life threatening situations) in Islet Auto transplant recipients. They can cause irreversible loss of islet cell function. Please contact patient's transplant care coordinator, Erlinda Multani RN BSN at [phone]/pager: [phone] and endocrinologist prior to administration.       Povidone Iodine Hives     Causes skin to blister     Naproxen      Other reaction(s): Abdominal Pain  Pt allergic to Naprosyn     Nsaids      naprosyn = GI upset     Povidone Iodine      blisters     Sulfasalazine Nausea and Nausea and Vomiting       Family history:  Family History   Problem Relation Age of Onset     Family History Negative Mother      Respiratory Father         COPD;  at 69      "Genitourinary Problems Father         kidney stones     Substance Abuse Father      Depression Father      Asthma Father      Heart Disease Paternal Grandfather         M.I.     Coronary Artery Disease Paternal Grandfather      Hyperlipidemia Paternal Grandfather      Genitourinary Problems Brother         multiple brothers with kidney stones     Gastrointestinal Disease Maternal Grandmother         undiagnosed 'gut' issues     Coronary Artery Disease Maternal Grandfather      Hyperlipidemia Maternal Grandfather      Cerebrovascular Disease Paternal Grandmother         At the age of 103     Anxiety Disorder Paternal Grandmother      Osteoporosis Paternal Grandmother      Anxiety Disorder Son      Anxiety Disorder Daughter      Asthma Daughter      Colon Cancer No family hx of        Social history:  Social History     Tobacco Use     Smoking status: Never Smoker     Smokeless tobacco: Never Used   Substance Use Topics     Alcohol use: Not Currently     Alcohol/week: 0.0 standard drinks     Frequency: Never     Drinks per session: Patient refused     Binge frequency: Never    Marital status: .    Nursing Notes:   Sully Phelan  6/25/2021 10:50 AM  Signed  Chief Complaint   Patient presents with     Consult     rectal prolapse       Vitals:    06/25/21 1048   BP: 139/72   BP Location: Left arm   Patient Position: Sitting   Cuff Size: Adult Regular   Pulse: 87   SpO2: 100%   Weight: 115 lb 8 oz   Height: 5' 4\"       Body mass index is 19.83 kg/m .    Sully Phelan CMA         40 minutes spent on the date of the encounter doing chart review, history and exam, documentation and further activities as noted above.     MITZY Hancock, NP-C  Colon and Rectal Surgery   [facility]    This note was created using speech recognition software and may contain unintended word substitutions.    "

## 2021-06-25 NOTE — LETTER
2021       RE: Lynn Thompson  [address] [id number]     Dear Colleague,    Thank you for referring your patient, Lynn Thompson, to the [facility] COLON AND RE[facility]AL [facility]GERY [facility]IN[facility] [place] [facility]. Please see a copy of my visit note below.    Colon and Rectal Surgery Consult Clinic Note    Date: 2021     Referring provider:  Maryana Brooks MD  [address] [place] [address]     RE: Lynn Thompson  : 1965  FAUSTO: 2021    Lynn Thompson is a very pleasant 56 year old female with complex past medical and surgical history who presents today for rectal prolapse.    HPI:  Lynn has a history of total pancreatectomy with islet L autotransplant in , splenectomy, choledochoduodenostomy, gastric bypass, appendectomy, ileal resection in  for Crohn's disease, hernia repair, lysis of adhesions x2, and a small bowel obstruction in .  She reports that her Crohn's has been in remission for over 20 years and she has not needed to be on any medications for this.  About a year ago she had significant constipation and thinks she developed a rectal prolapse at that time.  This has been coming out about once every week to 2 weeks but lasts about 3 days.  She had difficulty getting it back in last week.  She has discomfort but no significant pain when it is out.  She is starting to have difficulty holding bowel movements.  She is leaking loose stools.  She provides several very good pictures of a significant full-thickness rectal prolapse.  She has never had any anorectal surgeries in the past.  She has had 1 vaginal birth of a 4 pound 3 ounce baby and did have an episiotomy.  She denies any urinary incontinence or vaginal or uterine prolapse symptoms.  She had a colonoscopy on 2021 with hyperplastic polyps only.    Physical Examination:  /72 (BP Location: Left arm, Patient  "Position: Sitting, Cuff Size: Adult Regular)   Pulse 87   Ht 5' 4\"   Wt 115 lb 8 oz   LMP 12/19/2013   SpO2 100%   BMI 19.83 kg/m    General: Alert, oriented, in no acute distress, sitting comfortably    Assessment/Plan: 56 year old female with complex past surgical and medical history with full-thickness rectal prolapse.  She provides several very good photographs today of a significant full-thickness rectal prolapse.  She had a recent, normal colonoscopy.  It would like to get defecography at the pelvic floor center and then have her return to clinic to meet with Dr. Sheridan to discuss surgical options in the setting of a complex past surgical history.  We did discuss that her fecal incontinence may or may not be improved by repair of the prolapse.  She reports that the incontinence is fairly minimal and is only started to occur recently.  We will set her up to meet with Dr. Sheridan once she is scheduled for defecography. Patient's questions were answered to her stated satisfaction and she is in agreement with this plan.    Medical history:  Past Medical History:   Diagnosis Date     Benign paroxysmal positional vertigo     occ.      Calculus of kidney 05/2005    x1 on L side passed, several stones.  Has been tested for oxalate.     Chronic abdominal pain 07/17/2013     Chronic pain      Chronic pancreatitis 07/17/2013     Depression     also occ panic spells     Depressive disorder      Diabetes (H) 5/10/2013    Total Pancreatomy with Auto Islets Transplant     Dyspepsia 06/1999    H. 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 Presley      SECTION       CHOLECYSTECTOMY       COLONOSCOPY   &     colonoscopy     COLONOSCOPY       COLONOSCOPY N/A 3/31/2021    Procedure: COLONOSCOPY INCOMPLETE Aborted due to incomplete prep  will need to take additional prep and return tomorrow 21;  Surgeon: Ihsan Saenz MD;  Location: [facility]     COMBINED CYSTOSCOPY, RETROGRADES, URETEROSCOPY, LASER HOLMIUM LITHOTRIPSY URETER(S), INSERT STENT Right 2015    Procedure: COMBINED CYSTOSCOPY, RETROGRADES, URETEROSCOPY, LASER HOLMIUM LITHOTRIPSY URETER(S), INSERT STENT;  Surgeon: Kennedi Aldana MD;  Location: [facility]     COMBINED CYSTOSCOPY, RETROGRADES, URETEROSCOPY, LASER HOLMIUM LITHOTRIPSY URETER(S), INSERT STENT Right 2015    Procedure: COMBINED CYSTOSCOPY, RETROGRADES, URETEROSCOPY, LASER HOLMIUM LITHOTRIPSY URETER(S), INSERT STENT;  Surgeon: Kennedi Aldana MD;  Location: [facility]     COSMETIC SURGERY  2002    Tummy tuck     CYSTECTOMY OVARIAN BENIGN Right      CYSTOSCOPY, RETROGRADES, INSERT STENT URETER(S), COMBINED  10/02/2012    Procedure: COMBINED CYSTOSCOPY, RETROGRADES, INSERT STENT URETER(S);  COMBINED CYSTOSCOPY,  , INSERT LEFT STENT URETER;  Surgeon: Johny Baez MD;  Location: [facility]     ESOPHAGOSCOPY, GASTROSCOPY, DUODENOSCOPY (EGD), COMBINED N/A 2018    Procedure: COMBINED ESOPHAGOSCOPY, GASTROSCOPY, DUODENOSCOPY (EGD);  ESOPHAGOSCOPY, GASTROSCOPY, DUODENOSCOPY (EGD)    ;  Surgeon: Tamir Rodgers MD;  Location: [facility]     EXTRACORPOREAL SHOCK WAVE LITHOTRIPSY (ESWL)  10/16/2012    Procedure: EXTRACORPOREAL SHOCK WAVE LITHOTRIPSY (ESWL);  left EXTRACORPOREAL SHOCK WAVE LITHOTRIPSY (ESWL) ;  Surgeon:  Johny Baez MD;  Location: [facility]     Gastric bypass NOS       HERNIA REPAIR  02/2015     IRRIGATION AND DEBRIDEMENT HAND, COMBINED Left 10/30/2020    Procedure: Left hand sharp excisional debridement of skin, subcutaneous tissue and fat with a scalpel, 2 x 1 x 1 cm.;  Surgeon: Demian Renteria MD;  Location: [facility]     LAP, LYSIS OF ADHESIONS       LAPAROSCOPIC LYSIS ADHESIONS N/A 02/20/2015    Procedure: LAPAROSCOPIC LYSIS ADHESIONS;  Surgeon: Aaron Early MD;  [facility]     LAPAROSCOPIC LYSIS ADHESIONS N/A 12/29/2015    Procedure: LAPAROSCOPIC LYSIS ADHESIONS;  Surgeon: Aaron Early MD;  [facility]     PANCREATECTOMY, TRANSPLANT AUTO ISLET CELL, COMBINED  05/10/2013    Procedure: COMBINED PANCREATECTOMY, TRANSPLANT AUTO ISLET CELL;  Pancreatectomy, Auto Islet Cell Transplant   hernia repair, jejunostomy tube and liver biopsies with Anesthesia General with block;  Surgeon: Aaron Early MD;  [facility]: [facility]     Partial ileum resection  1992     REPAIR PTOSIS BROW BILATERAL Bilateral 06/09/2020    Procedure: BILATERAL BROW PTOSIS REPAIR;  Surgeon: Denise Alberts MD;  Location:  OR     Surgery for SBO  2015     TONSILLECTOMY, ADENOIDECTOMY, COMBINED  1997     TRANSPLANT  5/10/13    Pancreatic Auto-Islet Transplant       Problem list:  Patient Active Problem List    Diagnosis Date Noted     Health Care Home 05/03/2011     Priority: High     EMERGENCY CARE PLAN  Presenting Problem Signs and Symptoms Treatment Plan    Questions or conerns during clinic hours    I will call the clinic directly     Questions or conerns outside clinic hours    I will call the 24 hour nurse line at [phone]    Patient needs to schedule an appointment    I will call the 24 hour scheduling team at [phone] or clinic directly    Same day treatment     I will call the clinic first, nurse line if after hours, urgent care and express care if needed                            DX V65.8 REPLACED WITH  [facility] (04/08/2013)       Other iron deficiency anemia 03/15/2021     Priority: Medium     Dog bite, initial encounter 10/29/2020     Priority: Medium     Cellulitis, unspecified cellulitis site 10/29/2020     Priority: Medium     Fever 06/11/2020     Priority: Medium     Antibiotic-associated diarrhea 08/28/2017     Priority: Medium     Elevated LFTs 08/28/2017     Priority: Medium     E coli bacteremia 08/25/2017     Priority: Medium     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     Priority: Medium     Acquired hypothyroidism 11/03/2016     Priority: Medium     Thyroid nodule 11/01/2016     Priority: Medium     Dysthymia 03/01/2016     Priority: Medium     Anxiety 03/01/2016     Priority: Medium     S/P exploratory laparotomy 12/29/2015     Priority: Medium     BLU (obstructive sleep apnea) 12/23/2015     Priority: Medium     Asplenia 10/24/2014     Priority: Medium     Exocrine pancreatic insufficiency 05/17/2013     Priority: Medium     Chronic pain syndrome 02/23/2013     Priority: Medium     Gastric bypass status for obesity 10/26/2010     Priority: Medium     Iron deficiency anemia secondary to inadequate dietary iron intake 12/28/2004     Priority: Medium     relates to gastric bypass         Medications:  Current Outpatient Medications   Medication Sig Dispense Refill     amylase-lipase-protease (VIOKACE) 99901-08437 units TABS tablet Take 4-5 with meals and 2 with snacks 500 tablet 11     calcium carbonate 600 mg-vitamin D 400 units (CALTRATE) 600-400 MG-UNIT per tablet Take 1 tablet by mouth daily       Continuous Blood Gluc  (DEXCOM G6 ) MODESTA Use as directed to check blood glucose levels 1 Device 1     Continuous Blood Gluc Sensor (DEXCOM G6 SENSOR) MISC Change every 10 days 3 each 11     Continuous Blood Gluc Transmit (DEXCOM G6 TRANSMITTER) MISC Change every 3 months 1 each 3     DULoxetine (CYMBALTA) 20 MG capsule Take 20 mg by mouth daily       escitalopram  (LEXAPRO) 20 MG tablet Take 20 mg by mouth daily       estradiol (ESTRACE) 0.1 MG/GM vaginal cream PLACE 2 GRAMS VAGINALLY 2 TIMES WEEKLY 42.5 g 1     famotidine (PEPCID) 40 MG tablet Take 1 tablet (40 mg) by mouth 2 times daily as needed for heartburn 180 tablet 3     gabapentin (NEURONTIN) 300 MG capsule Take 300 mg by mouth nightly as needed       glucose 40 % GEL Take 15-30 g by mouth every 15 minutes as needed. 1 Tube 11     hydrOXYzine (ATARAX) 25 MG tablet TAKE 1-2 TABLETS BY MOUTH 2 TIMES DAILY AS NEEDED FOR ANXIETY  0     insulin aspart (NOVOLOG PENFILL) 100 UNIT/ML cartridge Inject 0.5-2 units subcutaneously 4 times daily with meals and at bedtime 15 mL 11     insulin glargine (LANTUS PEN) 100 UNIT/ML pen Inject 6 units daily 15 mL 3     levothyroxine (SYNTHROID/LEVOTHROID) 100 MCG tablet TAKE ONE TABLET BY MOUTH DAILY. APPOINTMENT NEEDED FOR FURTHER REFILLS. 90 tablet 0     loratadine (CLARITIN) 10 MG tablet Take 10 mg by mouth daily as needed        modafinil (PROVIGIL) 200 MG tablet Take 400 mg by mouth daily       omeprazole (PRILOSEC) 40 MG DR capsule Take 1 capsule (40 mg) by mouth 2 times daily Take 30-60 minutes before a meal. 180 capsule 3     ondansetron (ZOFRAN-ODT) 8 MG ODT tab Take 8 mg by mouth 3 times daily as needed for nausea       STATIN NOT PRESCRIBED (INTENTIONAL) Please choose reason not prescribed from choices below.       traZODone (DESYREL) 50 MG tablet Take 50 mg by mouth nightly as needed for sleep         Allergies:  Allergies   Allergen Reactions     Corticosteroids Other (See Comments)     All oral, IV and injectable steroids are contraindicated (unless in life threatening situations) in Islet Auto transplant recipients. They can cause irreversible loss of islet cell function. Please contact patient's transplant care coordinator, Erlinda Multani RN BSN at [phone]/pager: [phone] and endocrinologist prior to administration.       Povidone Iodine Hives     Causes skin  "to blister     Naproxen      Other reaction(s): Abdominal Pain  Pt allergic to Naprosyn     Nsaids      naprosyn = GI upset     Povidone Iodine      blisters     Sulfasalazine Nausea and Nausea and Vomiting       Family history:  Family History   Problem Relation Age of Onset     Family History Negative Mother      Respiratory Father         COPD;  at 69     Genitourinary Problems Father         kidney stones     Substance Abuse Father      Depression Father      Asthma Father      Heart Disease Paternal Grandfather         M.I.     Coronary Artery Disease Paternal Grandfather      Hyperlipidemia Paternal Grandfather      Genitourinary Problems Brother         multiple brothers with kidney stones     Gastrointestinal Disease Maternal Grandmother         undiagnosed 'gut' issues     Coronary Artery Disease Maternal Grandfather      Hyperlipidemia Maternal Grandfather      Cerebrovascular Disease Paternal Grandmother         At the age of 103     Anxiety Disorder Paternal Grandmother      Osteoporosis Paternal Grandmother      Anxiety Disorder Son      Anxiety Disorder Daughter      Asthma Daughter      Colon Cancer No family hx of        Social history:  Social History     Tobacco Use     Smoking status: Never Smoker     Smokeless tobacco: Never Used   Substance Use Topics     Alcohol use: Not Currently     Alcohol/week: 0.0 standard drinks     Frequency: Never     Drinks per session: Patient refused     Binge frequency: Never    Marital status: .    Nursing Notes:   Sully Phelan  2021 10:50 AM  Signed  Chief Complaint   Patient presents with     Consult     rectal prolapse       Vitals:    21 1048   BP: 139/72   BP Location: Left arm   Patient Position: Sitting   Cuff Size: Adult Regular   Pulse: 87   SpO2: 100%   Weight: 115 lb 8 oz   Height: 5' 4\"       Body mass index is 19.83 kg/m .    Sully Phelan CMA         40 minutes spent on the date of the encounter doing chart review, history " and exam, documentation and further activities as noted above.     MITZY Hancock, NP-C  Colon and Rectal Surgery   [facility]    This note was created using speech recognition software and may contain unintended word substitutions.        Again, thank you for allowing me to participate in the care of your patient.      Sincerely,    MITZY Hancock CNP

## 2021-06-30 ENCOUNTER — TELEPHONE (OUTPATIENT)
Dept: ENDOCRINOLOGY | Facility: CLINIC | Age: 56
End: 2021-06-30

## 2021-06-30 NOTE — TELEPHONE ENCOUNTER
PRIOR AUTHORIZATION DENIED    Medication: Continuous Blood Gluc Sensor (DEXCOM G6 SENSOR) MISC--DENIED    Denial Date: 6/30/2021    Denial Rational: Excluded from pharmacy benefit     Appeal Information:

## 2021-06-30 NOTE — TELEPHONE ENCOUNTER
PA Initiation    Medication: Continuous Blood Gluc Sensor (DEXCOM G6 SENSOR) MISC  Insurance Company: [employer] - Phone [phone] Fax [phone]  Pharmacy Filling the Rx: [id number] [place], [place] - [address] [address]  Filling Pharmacy Phone: [phone]  Filling Pharmacy Fax: [phone]  Start Date: 6/30/2021

## 2021-06-30 NOTE — TELEPHONE ENCOUNTER
Prior Authorization Retail Medication Request    Medication/Dose: Continuous Blood Gluc Sensor (DEXCOM G6 SENSOR) MISC  ICD code (if different than what is on RX):  Post-pancreatectomy diabetes (H) [E89.1, E13.9, Z90.410]  Previously Tried and Failed:    Rationale:      Insurance Name:  [facility]  Insurance ID:  [id number]    Pharmacy Information (if different than what is on RX)  Name:  [facility]   Phone:  [phone]

## 2021-07-01 ENCOUNTER — TRANSFERRED RECORDS (OUTPATIENT)
Dept: HEALTH INFORMATION MANAGEMENT | Facility: CLINIC | Age: 56
End: 2021-07-01

## 2021-08-10 ENCOUNTER — OFFICE VISIT (OUTPATIENT)
Dept: URGENT CARE | Facility: URGENT CARE | Age: 56
End: 2021-08-10
Payer: MEDICARE

## 2021-08-10 VITALS
HEART RATE: 60 BPM | WEIGHT: 112.5 LBS | OXYGEN SATURATION: 96 % | SYSTOLIC BLOOD PRESSURE: 140 MMHG | BODY MASS INDEX: 19.31 KG/M2 | TEMPERATURE: 98.7 F | DIASTOLIC BLOOD PRESSURE: 80 MMHG | RESPIRATION RATE: 16 BRPM

## 2021-08-10 DIAGNOSIS — Z20.822 SUSPECTED COVID-19 VIRUS INFECTION: ICD-10-CM

## 2021-08-10 DIAGNOSIS — Z90.410 POST-PANCREATECTOMY DIABETES (H): ICD-10-CM

## 2021-08-10 DIAGNOSIS — R10.9 LEFT FLANK PAIN: ICD-10-CM

## 2021-08-10 DIAGNOSIS — J01.90 ACUTE NON-RECURRENT SINUSITIS, UNSPECIFIED LOCATION: Primary | ICD-10-CM

## 2021-08-10 DIAGNOSIS — E89.1 POST-PANCREATECTOMY DIABETES (H): ICD-10-CM

## 2021-08-10 DIAGNOSIS — H10.9 CONJUNCTIVITIS OF BOTH EYES, UNSPECIFIED CONJUNCTIVITIS TYPE: ICD-10-CM

## 2021-08-10 DIAGNOSIS — E13.9 POST-PANCREATECTOMY DIABETES (H): ICD-10-CM

## 2021-08-10 LAB
ALBUMIN UR-MCNC: NEGATIVE MG/DL
APPEARANCE UR: CLEAR
BILIRUB UR QL STRIP: NEGATIVE
COLOR UR AUTO: YELLOW
GLUCOSE UR STRIP-MCNC: 500 MG/DL
HGB UR QL STRIP: NEGATIVE
KETONES UR STRIP-MCNC: NEGATIVE MG/DL
LEUKOCYTE ESTERASE UR QL STRIP: NEGATIVE
NITRATE UR QL: NEGATIVE
PH UR STRIP: 6.5 [PH] (ref 5–7)
SP GR UR STRIP: 1.02 (ref 1–1.03)
UROBILINOGEN UR STRIP-ACNC: 1 E.U./DL

## 2021-08-10 PROCEDURE — U0005 INFEC AGEN DETEC AMPLI PROBE: HCPCS | Performed by: PHYSICIAN ASSISTANT

## 2021-08-10 PROCEDURE — U0003 INFECTIOUS AGENT DETECTION BY NUCLEIC ACID (DNA OR RNA); SEVERE ACUTE RESPIRATORY SYNDROME CORONAVIRUS 2 (SARS-COV-2) (CORONAVIRUS DISEASE [COVID-19]), AMPLIFIED PROBE TECHNIQUE, MAKING USE OF HIGH THROUGHPUT TECHNOLOGIES AS DESCRIBED BY CMS-2020-01-R: HCPCS | Performed by: PHYSICIAN ASSISTANT

## 2021-08-10 PROCEDURE — 99214 OFFICE O/P EST MOD 30 MIN: CPT | Performed by: PHYSICIAN ASSISTANT

## 2021-08-10 PROCEDURE — 81003 URINALYSIS AUTO W/O SCOPE: CPT | Performed by: PHYSICIAN ASSISTANT

## 2021-08-10 RX ORDER — FLUCONAZOLE 150 MG/1
150 TABLET ORAL ONCE
Qty: 1 TABLET | Refills: 0 | Status: SHIPPED | OUTPATIENT
Start: 2021-08-10 | End: 2021-08-17

## 2021-08-11 LAB — SARS-COV-2 RNA RESP QL NAA+PROBE: NEGATIVE

## 2021-08-11 NOTE — PATIENT INSTRUCTIONS
1.  Take antibiotic according to instructions, with food to prevent stomach upset. If you are prone to stomach upset with antibiotics, I recommend adding a probiotic to this regimen.  Culturelle is a trusted brand.  2.  I recommend using Mucinex to help thin mucus secretions.  Adding a nasal steroid spray such as Flonase can also be helpful with clearing sinus congestion.  3.  Take Tylenol 650mg every 4 hours or ibuprofen 600mg every 6 hours by mouth for pain/fever.  Do not exceed 4000mg of acetaminophen or 2400mg of ibuprofen from any source in a 24 hour period.  Taking Tylenol and ibuprofen together may be helpful in reducing pain.   4.  Follow-up if you not having any improvement in your symptoms over the next 5 days.    Sinusitis  The sinuses are air-filled spaces within the bones of the face. They connect to the inside of the nose. Sinusitis is an inflammation of the tissue that lines the sinuses. Sinusitis can occur during a cold. It can also happen due to allergies to pollens and other particles in the air. Sinusitis can cause symptoms of sinus congestion and a feeling of fullness. A sinus infection causes fever, headache, and facial pain. There is often green or yellow fluid draining from the nose or into the back of the throat (post-nasal drip). You have been given antibiotics to treat this condition.  Home care    Take the full course of antibiotics as instructed. Do not stop taking them, even when you feel better.    Drink plenty of water, hot tea, and other liquids. This may help thin nasal mucus. It also may help your sinuses drain fluids.    Heat may help soothe painful areas of your face. Use a towel soaked in hot water. Or,  the shower and direct the warm spray onto your face. Using a vaporizer along with a menthol rub at night may also help soothe symptoms.     An expectorant with guaifenesin may help thin nasal mucus and help your sinuses drain fluids.    You can use an over-the-counter  decongestant, unless a similar medicine was prescribed to you. Nasal sprays work the fastest. Use one that contains phenylephrine or oxymetazoline. First blow your nose gently. Then use the spray. Do not use these medicines more often than directed on the label. If you do, your symptoms may get worse. You may also take pills that contain pseudoephedrine. Don t use products that combine multiple medicines. This is because side effects may be increased. Read labels. You can also ask the pharmacist for help. (People with high blood pressure should not use decongestants. They can raise blood pressure.)    Over-the-counter antihistamines may help if allergies contributed to your sinusitis.      Do not use nasal rinses or irrigation during an acute sinus infection, unless your healthcare provider tells you to. Rinsing may spread the infection to other areas in your sinuses.    Use acetaminophen or ibuprofen to control pain, unless another pain medicine was prescribed to you. If you have chronic liver or kidney disease or ever had a stomach ulcer, talk with your healthcare provider before using these medicines. (Aspirin should never be taken by anyone under age 18 who is ill with a fever. It may cause severe liver damage.)    Don't smoke. This can make symptoms worse.  Follow-up care  Follow up with your healthcare provider or our staff if you are better in 1 week.  When to seek medical advice  Call your healthcare provider if any of these occur:    Facial pain or headache that gets worse    Stiff neck    Unusual drowsiness or confusion    Swelling of your forehead or eyelids    Vision problems, such as blurred or double vision    Fever of 100.4 F (38 C) or higher, or as directed by your healthcare provider    Seizure    Breathing problems    Symptoms don't go away in 10 days  Prevention  Here are steps you can take to help prevent an infection:    Keep good hand washing habits.    Don t have close contact with people who  "have sore throats, colds, or other upper respiratory infections.    Don t smoke, and stay away from secondhand smoke.    Stay up to date with of your vaccines.  Date Last Reviewed: 11/1/2017 2000-2017 [employer]. [address] [place] [address], [place] [address]. All rights reserved. This information is not intended as a substitute for professional medical care. Always follow your healthcare professional's instructions.         Your COVID test results should be available within 3 days, but please allow up to 1 week. If you have MyChart, your COVID test results will be visible there. If you do not have MyChart, you will be called if your test is positive and sent a letter if your test is negative.  Please continue to isolate yourself until you receive your results.    Discharge Instructions for COVID-19 Patients  You have--or may have--COVID-19. Please follow the instructions listed below.   If you have a weakened immune system, discuss with your doctor any other actions you need to take.  How can I protect others?  If you have symptoms (fever, cough, body aches or trouble breathing):    Stay home and away from others (self-isolate) until:  ? Your other symptoms have resolved (gotten better). And   ? You've had no fever--and no medicine that reduces fever--for 1 full day (24 hours). And   ? At least 10 days have passed since your symptoms started. (You may need to wait 20 days. Follow the advice of your care team.)  If you don't show symptoms, but testing showed that you have COVID-19:    Stay home and away from others (self-isolate) until at least 10 days have passed since the date of your first positive COVID-19 test.  During this time    Stay in your own room, even for meals. Use your own bathroom if you can.    Stay away from others in your home. No hugging, kissing or shaking hands. No visitors.    Don't go to work, school or anywhere else.    Clean \"high touch\" surfaces often (doorknobs, counters, " handles). Use household cleaning spray or wipes.    You'll find a full list of  on the EPA website: www.epa.gov/pesticide-registration/list-n-disinfectants-use-against-sars-cov-2.    Cover your mouth and nose with a mask or other face covering to avoid spreading germs.    Wash your hands and face often. Use soap and water.    Caregivers in these groups are at risk for severe illness due to COVID-19:  ? People 65 years and older  ? People who live in a nursing home or long-term care facility  ? People with chronic disease (lung, heart, cancer, diabetes, kidney, liver, immunologic)  ? People who have a weakened immune system, including those who:    Are in cancer treatment    Take medicine that weakens the immune system, such as corticosteroids    Had a bone marrow or organ transplant    Have an immune deficiency    Have poorly controlled HIV or AIDS    Are obese (body mass index of 40 or higher)    Smoke regularly    Caregivers should wear gloves while washing dishes, handling laundry and cleaning bedrooms and bathrooms.    Use caution when washing and drying laundry: Don't shake dirty laundry and use the warmest water setting that you can.    For more tips on managing your health at home, go to www.cdc.gov/coronavirus/2019-ncov/downloads/10Things.pdf.  How can I take care of myself at home?  1. Get lots of rest. Drink extra fluids (unless a doctor has told you not to).  2. Take Tylenol (acetaminophen) for fever or pain. If you have liver or kidney problems, ask your family doctor if it's okay to take Tylenol.   Adults can take either:   ? 650 mg (two 325 mg pills) every 4 to 6 hours, or   ? 1,000 mg (two 500 mg pills) every 8 hours as needed.  ? Note: Don't take more than 3,000 mg in one day. Acetaminophen is found in many medicines (both prescribed and over-the-counter medicines). Read all labels to be sure you don't take too much.   For children, check the Tylenol bottle for the right dose. The dose is  based on the child's age or weight.  3. If you have other health problems (like cancer, heart failure, an organ transplant or severe kidney disease): Call your specialty clinic if you don't feel better in the next 2 days.  4. Know when to call 911. Emergency warning signs include:  ? Trouble breathing or shortness of breath  ? Pain or pressure in the chest that doesn't go away  ? Feeling confused like you haven't felt before, or not being able to wake up  ? Bluish-colored lips or face  5. Your doctor may have prescribed a blood thinner medicine. Follow their instructions.  Where can I get more information?    [facility] - About COVID-19:   [email or web address]    CDC - What to Do If You're Sick: www.cdc.gov/coronavirus/2019-ncov/about/steps-when-sick.html    Formerly named [facility] - Ending Home Isolation: www.cdc.gov/coronavirus/2019-ncov/hcp/disposition-in-home-patients.html    CDC - Caring for Someone: www.cdc.gov/coronavirus/2019-ncov/if-you-are-sick/care-for-someone.html    [facility] - Interim Guidance for Hospital Discharge to Home: www.health.[facility].[place]/diseases/coronavirus/hcp/hospdischarge.pdf    Below are the COVID-19 hotlines at the [place] Department [facility] ([facility]). Interpreters are available.  ? For health questions: Call 296-408-0802 or 1-556.399.8872 (7 a.m. to 7 p.m.)  ? For questions about schools and childcare: Call 082-713-5305 or 1-203.756.4571 (7 a.m. to 7 p.m.)    For informational purposes only. Not to replace the advice of your health care provider. Clinically reviewed by Dr. Sathish Alexandra.   Copyright   2020 [employer]. All rights reserved. [employer] [id number] - REV 01/05/21.

## 2021-08-11 NOTE — PROGRESS NOTES
Assessment/Plan:    No acute distress or toxicity noted. Lungs CTAB, no signs of pneumonia. Will treat for acute sinusitis with Augmentin; pt requests fluconazole as she often gets yeast infections when on antibiotics. Suspect conjunctivitis viral in etiology due to associated URI symptoms; will not Rx antibiotic eyedrops.    Discussed that symptoms do overlap with possible COVID-19, and patient was tested for this today. Isolate while awaiting test results.     UA unremarkable except glucosuria. Pt notes blood sugar was 120 today before lunch; she ate a cookie prior to coming to the clinic. No ketones in urine or stigma of DKA. Suspect flank pain is musculoskeletal; heat and Tylenol advised PRN.      See patient instructions below.  At the end of the encounter, I discussed results, diagnosis, medications. Discussed red flags for immediate return to clinic/ER, as well as indications for follow up if no improvement. Patient understood and agreed to plan. Patient was stable for discharge.      ICD-10-CM    1. Acute non-recurrent sinusitis, unspecified location  J01.90 amoxicillin-clavulanate (AUGMENTIN) 875-125 MG tablet     fluconazole (DIFLUCAN) 150 MG tablet   2. Left flank pain  R10.9 UA with Microscopic reflex to Culture - lab collect     UA Macro with Reflex to Micro and Culture - lab collect     UA Macro with Reflex to Micro and Culture - lab collect     CANCELED: UA with Microscopic reflex to Culture - lab collect   3. Suspected COVID-19 virus infection  Z20.822 Symptomatic COVID-19 Virus (Coronavirus) by PCR Nasopharyngeal   4. Conjunctivitis of both eyes, unspecified conjunctivitis type  H10.9    5. 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    LESLIE CorriganA, PA-C  [facility]  [place]    ------------------------------------------------------------------------------------------------------------------------------------------------------------------------  HPI:  Lynn Thompson is a 56 year old female who presents for evaluation of bilateral ear pain, sore throat, nasal congestion, cough, upper dental pain, and sinus pressure onset 5 days ago. She also had low grade fever of 100.2 F on the first day of her symptoms. She developed bilateral eye drainage today with eyes matted shut this morning. She does not wear contact lenses. No treatments tried. Patient reports no myalgias, loss of sense of taste or smell, headache, chest pain, shortness of breath, abdominal pain, nausea, vomiting, diarrhea, rash, vision changes, eye pain, FB sensation/exposure, or any other symptoms. No known sick contacts/COVID exposure. She is vaccinated against COVID-19.    She also notes L flank pain today, no injury. Pain does not radiate. She does not report urinary symptoms but states she has a hx of kidney infections with no urinary symptoms. She also has a hx of kidney stones; this pain does not feel similar/is not as severe.    She has a hx of auto islets transplant; is not on immunosuppressants. Has hx of post-pancreatectomy DM. Most recent A1c was 6.8% on 5/10/21.    Past Medical History:   Diagnosis Date     Benign paroxysmal positional vertigo     occ.      Calculus of kidney 05/2005    x1 on L side passed, several stones.  Has been tested for oxalate.     Chronic abdominal pain 07/17/2013     Chronic pain      Chronic pancreatitis 07/17/2013     Depression     also occ panic spells     Depressive disorder      Diabetes (H) 5/10/2013    Total Pancreatomy with Auto Islets Transplant     Dyspepsia 06/1999    H. pylori   treated     Headaches     still periodic HA's ;  often 5X/week     Hypertension 02/22/2016    Stress related     Iron deficiency anemia secondary to inadequate dietary iron intake 11/2003     relates to gastric bypass     Post-pancreatectomy diabetes melltius 05/17/2013     Sleep apnea     uses splint     Spasm of sphincter of Oddi     [place] [facility] [place]     Thyroid nodule 11/01/2016     Vaccination not carried out        Vitals:    08/10/21 1857   BP: (!) 140/80   BP Location: Right arm   Patient Position: Chair   Cuff Size: Adult Regular   Pulse: 60   Resp: 16   Temp: 98.7  F (37.1  C)   TempSrc: Oral   SpO2: 96%   Weight: 51 kg (112 lb 8 oz)       Physical Exam  Vitals and nursing note reviewed.   HENT:      Right Ear: Tympanic membrane normal.      Left Ear: Tympanic membrane normal.      Nose:      Right Sinus: Maxillary sinus tenderness and frontal sinus tenderness present.      Left Sinus: Maxillary sinus tenderness and frontal sinus tenderness present.      Mouth/Throat:      Mouth: Mucous membranes are moist.      Pharynx: Oropharynx is clear.   Eyes:      General:         Right eye: Discharge present.         Left eye: Discharge present.     Extraocular Movements: Extraocular movements intact.      Conjunctiva/sclera:      Right eye: Right conjunctiva is injected.      Left eye: Left conjunctiva is injected.      Pupils: Pupils are equal, round, and reactive to light.      Comments: Scant yellow discharge to medial eyes bilaterally   Cardiovascular:      Rate and Rhythm: Normal rate and regular rhythm.      Heart sounds: Normal heart sounds.   Pulmonary:      Effort: Pulmonary effort is normal.      Breath sounds: Normal breath sounds.   Abdominal:      Tenderness: There is left CVA tenderness. There is no right CVA tenderness.   Neurological:      Mental Status: She is alert.         Labs/Imaging:  Results for orders placed or performed in visit on 08/10/21 (from the past 24 hour(s))   Symptomatic COVID-19 Virus (Coronavirus) by PCR Nasopharyngeal    Specimen: Nasopharyngeal; Swab    Narrative    The following orders were created for panel order  Symptomatic COVID-19 Virus (Coronavirus) by PCR Nasopharyngeal.  Procedure                               Abnormality         Status                     ---------                               -----------         ------                     SARS-COV2 (COVID-19) Vir...[[id number]]                      In process                   Please view results for these tests on the individual orders.   UA Macro with Reflex to Micro and Culture - lab collect    Specimen: Urine, Clean Catch   Result Value Ref Range    Color Urine Yellow Colorless, Straw, Light Yellow, Yellow    Appearance Urine Clear Clear    Glucose Urine 500  (A) Negative mg/dL    Bilirubin Urine Negative Negative    Ketones Urine Negative Negative mg/dL    Specific Gravity Urine 1.020 1.003 - 1.035    Blood Urine Negative Negative    pH Urine 6.5 5.0 - 7.0    Protein Albumin Urine Negative Negative mg/dL    Urobilinogen Urine 1.0 0.2, 1.0 E.U./dL    Nitrite Urine Negative Negative    Leukocyte Esterase Urine Negative Negative    Narrative    Microscopic not indicated     *Note: Due to a large number of results and/or encounters for the requested time period, some results have not been displayed. A complete set of results can be found in Results Review.         Patient Instructions   1.  Take antibiotic according to instructions, with food to prevent stomach upset. If you are prone to stomach upset with antibiotics, I recommend adding a probiotic to this regimen.  Culturelle is a trusted brand.  2.  I recommend using Mucinex to help thin mucus secretions.  Adding a nasal steroid spray such as Flonase can also be helpful with clearing sinus congestion.  3.  Take Tylenol 650mg every 4 hours or ibuprofen 600mg every 6 hours by mouth for pain/fever.  Do not exceed 4000mg of acetaminophen or 2400mg of ibuprofen from any source in a 24 hour period.  Taking Tylenol and ibuprofen together may be helpful in reducing pain.   4.  Follow-up if you not having any improvement  in your symptoms over the next 5 days.    Sinusitis  The sinuses are air-filled spaces within the bones of the face. They connect to the inside of the nose. Sinusitis is an inflammation of the tissue that lines the sinuses. Sinusitis can occur during a cold. It can also happen due to allergies to pollens and other particles in the air. Sinusitis can cause symptoms of sinus congestion and a feeling of fullness. A sinus infection causes fever, headache, and facial pain. There is often green or yellow fluid draining from the nose or into the back of the throat (post-nasal drip). You have been given antibiotics to treat this condition.  Home care    Take the full course of antibiotics as instructed. Do not stop taking them, even when you feel better.    Drink plenty of water, hot tea, and other liquids. This may help thin nasal mucus. It also may help your sinuses drain fluids.    Heat may help soothe painful areas of your face. Use a towel soaked in hot water. Or,  the shower and direct the warm spray onto your face. Using a vaporizer along with a menthol rub at night may also help soothe symptoms.     An expectorant with guaifenesin may help thin nasal mucus and help your sinuses drain fluids.    You can use an over-the-counter decongestant, unless a similar medicine was prescribed to you. Nasal sprays work the fastest. Use one that contains phenylephrine or oxymetazoline. First blow your nose gently. Then use the spray. Do not use these medicines more often than directed on the label. If you do, your symptoms may get worse. You may also take pills that contain pseudoephedrine. Don t use products that combine multiple medicines. This is because side effects may be increased. Read labels. You can also ask the pharmacist for help. (People with high blood pressure should not use decongestants. They can raise blood pressure.)    Over-the-counter antihistamines may help if allergies contributed to your sinusitis.       Do not use nasal rinses or irrigation during an acute sinus infection, unless your healthcare provider tells you to. Rinsing may spread the infection to other areas in your sinuses.    Use acetaminophen or ibuprofen to control pain, unless another pain medicine was prescribed to you. If you have chronic liver or kidney disease or ever had a stomach ulcer, talk with your healthcare provider before using these medicines. (Aspirin should never be taken by anyone under age 18 who is ill with a fever. It may cause severe liver damage.)    Don't smoke. This can make symptoms worse.  Follow-up care  Follow up with your healthcare provider or our staff if you are better in 1 week.  When to seek medical advice  Call your healthcare provider if any of these occur:    Facial pain or headache that gets worse    Stiff neck    Unusual drowsiness or confusion    Swelling of your forehead or eyelids    Vision problems, such as blurred or double vision    Fever of 100.4 F (38 C) or higher, or as directed by your healthcare provider    Seizure    Breathing problems    Symptoms don't go away in 10 days  Prevention  Here are steps you can take to help prevent an infection:    Keep good hand washing habits.    Don t have close contact with people who have sore throats, colds, or other upper respiratory infections.    Don t smoke, and stay away from secondhand smoke.    Stay up to date with of your vaccines.  Date Last Reviewed: 11/1/2017 2000-2017 [employer]. [address] [place] [address], [place] [id number]. All rights reserved. This information is not intended as a substitute for professional medical care. Always follow your healthcare professional's instructions.         Your COVID test results should be available within 3 days, but please allow up to 1 week. If you have MyChart, your COVID test results will be visible there. If you do not have MyChart, you will be called if your test is positive and sent a letter if  "your test is negative.  Please continue to isolate yourself until you receive your results.    Discharge Instructions for COVID-19 Patients  You have--or may have--COVID-19. Please follow the instructions listed below.   If you have a weakened immune system, discuss with your doctor any other actions you need to take.  How can I protect others?  If you have symptoms (fever, cough, body aches or trouble breathing):    Stay home and away from others (self-isolate) until:  ? Your other symptoms have resolved (gotten better). And   ? You've had no fever--and no medicine that reduces fever--for 1 full day (24 hours). And   ? At least 10 days have passed since your symptoms started. (You may need to wait 20 days. Follow the advice of your care team.)  If you don't show symptoms, but testing showed that you have COVID-19:    Stay home and away from others (self-isolate) until at least 10 days have passed since the date of your first positive COVID-19 test.  During this time    Stay in your own room, even for meals. Use your own bathroom if you can.    Stay away from others in your home. No hugging, kissing or shaking hands. No visitors.    Don't go to work, school or anywhere else.    Clean \"high touch\" surfaces often (doorknobs, counters, handles). Use household cleaning spray or wipes.    You'll find a full list of  on the EPA website: www.epa.gov/pesticide-registration/list-n-disinfectants-use-against-sars-cov-2.    Cover your mouth and nose with a mask or other face covering to avoid spreading germs.    Wash your hands and face often. Use soap and water.    Caregivers in these groups are at risk for severe illness due to COVID-19:  ? People 65 years and older  ? People who live in a nursing home or long-term care facility  ? People with chronic disease (lung, heart, cancer, diabetes, kidney, liver, immunologic)  ? People who have a weakened immune system, including those who:    Are in cancer treatment    Take " medicine that weakens the immune system, such as corticosteroids    Had a bone marrow or organ transplant    Have an immune deficiency    Have poorly controlled HIV or AIDS    Are obese (body mass index of 40 or higher)    Smoke regularly    Caregivers should wear gloves while washing dishes, handling laundry and cleaning bedrooms and bathrooms.    Use caution when washing and drying laundry: Don't shake dirty laundry and use the warmest water setting that you can.    For more tips on managing your health at home, go to www.cdc.gov/coronavirus/2019-ncov/downloads/10Things.pdf.  How can I take care of myself at home?  1. Get lots of rest. Drink extra fluids (unless a doctor has told you not to).  2. Take Tylenol (acetaminophen) for fever or pain. If you have liver or kidney problems, ask your family doctor if it's okay to take Tylenol.   Adults can take either:   ? 650 mg (two 325 mg pills) every 4 to 6 hours, or   ? 1,000 mg (two 500 mg pills) every 8 hours as needed.  ? Note: Don't take more than 3,000 mg in one day. Acetaminophen is found in many medicines (both prescribed and over-the-counter medicines). Read all labels to be sure you don't take too much.   For children, check the Tylenol bottle for the right dose. The dose is based on the child's age or weight.  3. If you have other health problems (like cancer, heart failure, an organ transplant or severe kidney disease): Call your specialty clinic if you don't feel better in the next 2 days.  4. Know when to call 911. Emergency warning signs include:  ? Trouble breathing or shortness of breath  ? Pain or pressure in the chest that doesn't go away  ? Feeling confused like you haven't felt before, or not being able to wake up  ? Bluish-colored lips or face  5. Your doctor may have prescribed a blood thinner medicine. Follow their instructions.  Where can I get more information?     [facility] - About COVID-19:    [email or web address]    CDC - What to Do If You're Sick: www.cdc.gov/coronavirus/2019-ncov/about/steps-when-sick.html    CDC - Ending Home Isolation: www.cdc.gov/coronavirus/2019-ncov/hcp/disposition-in-home-patients.html    CDC - Caring for Someone: www.cdc.gov/coronavirus/2019-ncov/if-you-are-sick/care-for-someone.html    [facility] - Interim Guidance for Hospital Discharge to Home: www.health.[facility].[place]./diseases/coronavirus/hcp/hospdischarge.pdf    Below are the COVID-19 hotlines at the [place] Department of [facility] ([facility]). Interpreters are available.  ? For health questions: Call 016-906-3340 or 1-698.485.7183 (7 a.m. to 7 p.m.)  ? For questions about schools and childcare: Call 506-667-8126 or 1-447.635.6943 (7 a.m. to 7 p.m.)    For informational purposes only. Not to replace the advice of your health care provider. Clinically reviewed by Dr. Sathish Alexandra.   Copyright   2020 [facility]. All rights reserved. [employer] [id number] - REV 01/05/21.

## 2021-08-11 NOTE — PROGRESS NOTES
Pre-Visit Planning   Next 5 appointments (look out 90 days)    Aug 17, 2021 10:40 AM  (Arrive by 10:15 AM)  Adult Preventative Visit with Maryana Brooks MD  [facility] ([facility] ) [address] [facility] [address] [phone]        Appointment Notes for this encounter:   Yearly Preventive Exam    Questionnaires Reviewed/Assigned  No additional questionnaires are needed  Patient preferred phone number: [phone]    [employer] sent.    Alicia Toscano RN

## 2021-08-14 ASSESSMENT — ENCOUNTER SYMPTOMS
FEVER: 0
BREAST MASS: 0
CHILLS: 0
WEAKNESS: 0
EYE PAIN: 0
FREQUENCY: 0
COUGH: 0
DIARRHEA: 0
DYSURIA: 0
HEMATOCHEZIA: 0
DIZZINESS: 0
SHORTNESS OF BREATH: 0
HEADACHES: 1
ABDOMINAL PAIN: 0
SORE THROAT: 0
PARESTHESIAS: 0
PALPITATIONS: 0
CONSTIPATION: 0
NAUSEA: 0
HEARTBURN: 0
HEMATURIA: 0
NERVOUS/ANXIOUS: 0
JOINT SWELLING: 0
MYALGIAS: 0
ARTHRALGIAS: 0

## 2021-08-14 ASSESSMENT — ACTIVITIES OF DAILY LIVING (ADL): CURRENT_FUNCTION: NO ASSISTANCE NEEDED

## 2021-08-17 ENCOUNTER — OFFICE VISIT (OUTPATIENT)
Dept: FAMILY MEDICINE | Facility: CLINIC | Age: 56
End: 2021-08-17
Payer: MEDICARE

## 2021-08-17 VITALS
SYSTOLIC BLOOD PRESSURE: 118 MMHG | BODY MASS INDEX: 18.78 KG/M2 | TEMPERATURE: 98.6 F | WEIGHT: 110 LBS | RESPIRATION RATE: 14 BRPM | DIASTOLIC BLOOD PRESSURE: 70 MMHG | HEART RATE: 76 BPM | HEIGHT: 64 IN

## 2021-08-17 DIAGNOSIS — Z13.220 SCREENING FOR HYPERLIPIDEMIA: ICD-10-CM

## 2021-08-17 DIAGNOSIS — K21.9 GASTROESOPHAGEAL REFLUX DISEASE WITHOUT ESOPHAGITIS: ICD-10-CM

## 2021-08-17 DIAGNOSIS — E03.9 ACQUIRED HYPOTHYROIDISM: ICD-10-CM

## 2021-08-17 DIAGNOSIS — Z90.410 POST-PANCREATECTOMY DIABETES (H): ICD-10-CM

## 2021-08-17 DIAGNOSIS — R93.422 ABNORMAL FINDING ON DIAGNOSTIC IMAGING OF LEFT KIDNEY: ICD-10-CM

## 2021-08-17 DIAGNOSIS — E89.1 POST-PANCREATECTOMY DIABETES (H): ICD-10-CM

## 2021-08-17 DIAGNOSIS — Z00.00 ROUTINE GENERAL MEDICAL EXAMINATION AT A HEALTH CARE FACILITY: Primary | ICD-10-CM

## 2021-08-17 DIAGNOSIS — E13.9 POST-PANCREATECTOMY DIABETES (H): ICD-10-CM

## 2021-08-17 PROBLEM — K52.1 ANTIBIOTIC-ASSOCIATED DIARRHEA: Status: RESOLVED | Noted: 2017-08-28 | Resolved: 2021-08-17

## 2021-08-17 PROBLEM — T36.95XA ANTIBIOTIC-ASSOCIATED DIARRHEA: Status: RESOLVED | Noted: 2017-08-28 | Resolved: 2021-08-17

## 2021-08-17 PROBLEM — R79.89 ELEVATED LFTS: Status: RESOLVED | Noted: 2017-08-28 | Resolved: 2021-08-17

## 2021-08-17 PROBLEM — R78.81 E COLI BACTEREMIA: Status: RESOLVED | Noted: 2017-08-25 | Resolved: 2021-08-17
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
## 2021-08-17 PROBLEM — R50.9 FEVER: Status: RESOLVED | Noted: 2020-06-11 | Resolved: 2021-08-17

## 2021-08-17 PROBLEM — B96.20 E COLI BACTEREMIA: Status: RESOLVED | Noted: 2017-08-25 | Resolved: 2021-08-17

## 2021-08-17 PROBLEM — L03.90 CELLULITIS, UNSPECIFIED CELLULITIS SITE: Status: RESOLVED | Noted: 2020-10-29 | Resolved: 2021-08-17

## 2021-08-17 LAB — T3 SERPL-MCNC: 64 NG/DL (ref 60–181)

## 2021-08-17 PROCEDURE — 36415 COLL VENOUS BLD VENIPUNCTURE: CPT | Performed by: FAMILY MEDICINE

## 2021-08-17 PROCEDURE — 84443 ASSAY THYROID STIM HORMONE: CPT | Performed by: FAMILY MEDICINE

## 2021-08-17 PROCEDURE — 80053 COMPREHEN METABOLIC PANEL: CPT | Performed by: FAMILY MEDICINE

## 2021-08-17 PROCEDURE — 84439 ASSAY OF FREE THYROXINE: CPT | Performed by: FAMILY MEDICINE

## 2021-08-17 PROCEDURE — 99207 PR FOOT EXAM NO CHARGE: CPT | Mod: 25 | Performed by: FAMILY MEDICINE

## 2021-08-17 PROCEDURE — 99396 PREV VISIT EST AGE 40-64: CPT | Performed by: FAMILY MEDICINE

## 2021-08-17 PROCEDURE — 80061 LIPID PANEL: CPT | Performed by: FAMILY MEDICINE

## 2021-08-17 PROCEDURE — 84480 ASSAY TRIIODOTHYRONINE (T3): CPT | Performed by: FAMILY MEDICINE

## 2021-08-17 RX ORDER — LEVOTHYROXINE SODIUM 100 UG/1
100 TABLET ORAL DAILY
Qty: 90 TABLET | Refills: 3 | Status: SHIPPED | OUTPATIENT
Start: 2021-08-17 | End: 2022-08-09

## 2021-08-17 ASSESSMENT — ENCOUNTER SYMPTOMS
HEMATURIA: 0
DYSURIA: 0
NERVOUS/ANXIOUS: 0
JOINT SWELLING: 0
CHILLS: 0
DIARRHEA: 0
PARESTHESIAS: 0
DIZZINESS: 0
PALPITATIONS: 0
HEMATOCHEZIA: 0
HEARTBURN: 0
NAUSEA: 0
MYALGIAS: 0
SORE THROAT: 0
ARTHRALGIAS: 0
ABDOMINAL PAIN: 0
HEADACHES: 1
WEAKNESS: 0
BREAST MASS: 0
FEVER: 0
SHORTNESS OF BREATH: 0
FREQUENCY: 0
EYE PAIN: 0
COUGH: 0
CONSTIPATION: 0

## 2021-08-17 ASSESSMENT — MIFFLIN-ST. JEOR: SCORE: 1056.02

## 2021-08-17 ASSESSMENT — ACTIVITIES OF DAILY LIVING (ADL): CURRENT_FUNCTION: NO ASSISTANCE NEEDED

## 2021-08-17 NOTE — PROGRESS NOTES
"   SUBJECTIVE:   CC: Lynn Thompson is an 56 year old woman who presents for preventive health visit.       Patient has been advised of split billing requirements and indicates understanding: Yes     Healthy Habits:     In general, how would you rate your overall health?  Good    Frequency of exercise:  2-3 days/week    Duration of exercise:  30-45 minutes    Do you usually eat at least 4 servings of fruit and vegetables a day, include whole grains    & fiber and avoid regularly eating high fat or \"junk\" foods?  Yes    Taking medications regularly:  Yes    Medication side effects:  Not applicable    Ability to successfully perform activities of daily living:  No assistance needed    Home Safety:  No safety concerns identified    Hearing Impairment:  No hearing concerns    In the past 6 months, have you been bothered by leaking of urine?  No    In general, how would you rate your overall mental or emotional health?  Good      PHQ-2 Total Score: 2    Additional concerns today:  Yes          Weight loss - continues to have weight loss, following with Endo to manage pancreatic enzymes and with Hematology to manage anemia. Normal colonoscopy earlier this year. Normal EGD 2018, maintains her dyspepsia through taking a PPI and H2B daily. Normal mammogram earlier this year. No changing skin moles. Reports she eats at least 3 meals per day. Feeling fatigued.     I can see from a CT scan earlier this year comment of a lobulated left kidney, not present on CT scan in 2019. No further work up was undertaken.       Today's PHQ-2 Score:   PHQ-2 ( 1999 [employer]) 8/14/2021   Q1: Little interest or pleasure in doing things 1   Q2: Feeling down, depressed or hopeless 1   PHQ-2 Score 2   Q1: Little interest or pleasure in doing things Several days   Q2: Feeling down, depressed or hopeless Several days   PHQ-2 Score 2       Abuse: Current or Past (Physical, Sexual or Emotional) - No  Do you feel safe in your environment? Yes    Have " you ever done Advance Care Planning? (For example, a Health Directive, POLST, or a discussion with a medical provider or your loved ones about your wishes): No, advance care planning information given to patient to review.  Patient plans to discuss their wishes with loved ones or provider.      Social History     Tobacco Use     Smoking status: Never Smoker     Smokeless tobacco: Never Used   Substance Use Topics     Alcohol use: Not Currently     Alcohol/week: 0.0 standard drinks         Alcohol Use 2021   Prescreen: >3 drinks/day or >7 drinks/week? Not Applicable   Prescreen: >3 drinks/day or >7 drinks/week? -       Reviewed orders with patient.  Reviewed health maintenance and updated orders accordingly - Yes    Patient Active Problem List   Diagnosis     Iron deficiency anemia secondary to inadequate dietary iron intake     Gastric bypass status for obesity     Health Care Home     Chronic pain syndrome     Exocrine pancreatic insufficiency     Asplenia     BLU (obstructive sleep apnea)     S/P exploratory laparotomy     Dysthymia     Anxiety     Thyroid nodule     Acquired hypothyroidism     Post-pancreatectomy diabetes (H)     Other iron deficiency anemia     Past Surgical History:   Procedure Laterality Date     ABDOMINOPLASTY  2002    Tummy tuck     APPENDECTOMY  1990     BUNIONECTOMY Right 1998     CBD Stent placement  2002    CBD stent; Dr. Presley      SECTION       CHOLECYSTECTOMY       COLONOSCOPY   &     colonoscopy     COLONOSCOPY       COLONOSCOPY N/A 3/31/2021    Procedure: COLONOSCOPY INCOMPLETE Aborted due to incomplete prep  will need to take additional prep and return tomorrow 21;  Surgeon: Ihsan Saenz MD;  Location: [facility]     COMBINED CYSTOSCOPY, RETROGRADES, URETEROSCOPY, LASER HOLMIUM LITHOTRIPSY URETER(S), INSERT STENT Right 2015    Procedure: COMBINED CYSTOSCOPY, RETROGRADES, URETEROSCOPY, LASER HOLMIUM LITHOTRIPSY URETER(S), INSERT STENT;   Surgeon: Kennedi Aldana MD;  Location: [facility]     COMBINED CYSTOSCOPY, RETROGRADES, URETEROSCOPY, LASER HOLMIUM LITHOTRIPSY URETER(S), INSERT STENT Right 04/20/2015    Procedure: COMBINED CYSTOSCOPY, RETROGRADES, URETEROSCOPY, LASER HOLMIUM LITHOTRIPSY URETER(S), INSERT STENT;  Surgeon: Kennedi Aldana MD;  Location: [facility]     COSMETIC SURGERY  6/24/2002    Tummy tuck     CYSTECTOMY OVARIAN BENIGN Right      CYSTOSCOPY, RETROGRADES, INSERT STENT URETER(S), COMBINED  10/02/2012    Procedure: COMBINED CYSTOSCOPY, RETROGRADES, INSERT STENT URETER(S);  COMBINED CYSTOSCOPY,  , INSERT LEFT STENT URETER;  Surgeon: Johny Baez MD;  Location: [facility]     ESOPHAGOSCOPY, GASTROSCOPY, DUODENOSCOPY (EGD), COMBINED N/A 01/24/2018    Procedure: COMBINED ESOPHAGOSCOPY, GASTROSCOPY, DUODENOSCOPY (EGD);  ESOPHAGOSCOPY, GASTROSCOPY, DUODENOSCOPY (EGD)    ;  Surgeon: Tamir Rodgers MD;  Location: [facility]     EXTRACORPOREAL SHOCK WAVE LITHOTRIPSY (ESWL)  10/16/2012    Procedure: EXTRACORPOREAL SHOCK WAVE LITHOTRIPSY (ESWL);  left EXTRACORPOREAL SHOCK WAVE LITHOTRIPSY (ESWL) ;  Surgeon: Johny Baez MD;  Location: [facility]     Gastric bypass NOS       HERNIA REPAIR  02/2015     IRRIGATION AND DEBRIDEMENT HAND, COMBINED Left 10/30/2020    Procedure: Left hand sharp excisional debridement of skin, subcutaneous tissue and fat with a scalpel, 2 x 1 x 1 cm.;  Surgeon: Demian Renteria MD;  Location: [facility]     LAP, LYSIS OF ADHESIONS       LAPAROSCOPIC LYSIS ADHESIONS N/A 02/20/2015    Procedure: LAPAROSCOPIC LYSIS ADHESIONS;  Surgeon: Aaron Early MD;  [facility]     LAPAROSCOPIC LYSIS ADHESIONS N/A 12/29/2015    Procedure: LAPAROSCOPIC LYSIS ADHESIONS;  Surgeon: Aaron Early MD;  [facility]     PANCREATECTOMY, TRANSPLANT AUTO ISLET CELL, COMBINED  05/10/2013    Procedure: COMBINED PANCREATECTOMY, TRANSPLANT AUTO ISLET CELL;  Pancreatectomy, Auto Islet Cell Transplant   hernia repair, jejunostomy  tube and liver biopsies with Anesthesia General with block;  Surgeon: Aaron Early MD;  [facility]: [facility]     Partial ileum resection       REPAIR PTOSIS BROW BILATERAL Bilateral 2020    Procedure: BILATERAL BROW PTOSIS REPAIR;  Surgeon: Denise Alberts MD;  Location:  OR     Surgery for SBO       TONSILLECTOMY, ADENOIDECTOMY, COMBINED  1997     TRANSPLANT  5/10/13    Pancreatic Auto-Islet Transplant       Social History     Tobacco Use     Smoking status: Never Smoker     Smokeless tobacco: Never Used   Substance Use Topics     Alcohol use: Not Currently     Alcohol/week: 0.0 standard drinks     Family History   Problem Relation Age of Onset     Family History Negative Mother      Respiratory Father         COPD;  at 69     Genitourinary Problems Father         kidney stones     Substance Abuse Father      Depression Father      Asthma Father      Heart Disease Paternal Grandfather         M.I.     Coronary Artery Disease Paternal Grandfather      Hyperlipidemia Paternal Grandfather      Genitourinary Problems Brother         multiple brothers with kidney stones     Gastrointestinal Disease Maternal Grandmother         undiagnosed 'gut' issues     Coronary Artery Disease Maternal Grandfather      Hyperlipidemia Maternal Grandfather      Cerebrovascular Disease Paternal Grandmother         At the age of 103     Anxiety Disorder Paternal Grandmother      Osteoporosis Paternal Grandmother      Anxiety Disorder Son      Anxiety Disorder Daughter      Asthma Daughter      Colon Cancer No family hx of            Breast Cancer Screening:  Any new diagnosis of family breast, ovarian, or bowel cancer? No    FHS-7: No flowsheet data found.    Mammogram Screening: Recommended mammography every 1-2 years with patient discussion and risk factor consideration  Pertinent mammograms are reviewed under the imaging tab.    History of abnormal Pap smear: NO - age 30-65 PAP every 5 years with negative HPV  "co-testing recommended  PAP / HPV Latest Ref Rng & Units 3/26/2019 9/14/2016 9/25/2012   PAP (Historical) - NIL NIL NIL   HPV16 NEG:Negative Negative Negative -   HPV18 NEG:Negative Negative Negative -   HRHPV NEG:Negative Negative Negative -     Reviewed and updated as needed this visit by clinical staff  Tobacco  Allergies  Meds   Med Hx  Surg Hx  Fam Hx  Soc Hx        Reviewed and updated as needed this visit by Provider    Meds               Review of Systems   Constitutional: Negative for chills and fever.   HENT: Negative for congestion, ear pain, hearing loss and sore throat.    Eyes: Negative for pain and visual disturbance.   Respiratory: Negative for cough and shortness of breath.    Cardiovascular: Negative for chest pain, palpitations and peripheral edema.   Gastrointestinal: Negative for abdominal pain, constipation, diarrhea, heartburn, hematochezia and nausea.   Breasts:  Negative for tenderness, breast mass and discharge.   Genitourinary: Negative for dysuria, frequency, genital sores, hematuria, pelvic pain, urgency, vaginal bleeding and vaginal discharge.   Musculoskeletal: Negative for arthralgias, joint swelling and myalgias.   Skin: Negative for rash.   Neurological: Positive for headaches. Negative for dizziness, weakness and paresthesias.   Psychiatric/Behavioral: Negative for mood changes. The patient is not nervous/anxious.         OBJECTIVE:   /70 (BP Location: Right arm, Patient Position: Sitting, Cuff Size: Adult Regular)   Pulse 76   Temp 98.6  F (37  C) (Oral)   Resp 14   Ht 1.613 m (5' 3.5\")   Wt 49.9 kg (110 lb)   LMP 12/19/2013   Breastfeeding No   BMI 19.18 kg/m    Physical Exam  GENERAL APPEARANCE: healthy, alert and no distress  EYES: Eyes grossly normal to inspection, PERRL and conjunctivae and sclerae normal  HENT: ear canals and TM's normal, nose and mouth without ulcers or lesions, oropharynx clear and oral mucous membranes moist  NECK: no adenopathy, no " asymmetry, masses, or scars and thyroid normal to palpation  RESP: lungs clear to auscultation - no rales, rhonchi or wheezes  BREAST: normal without masses, tenderness or nipple discharge and no palpable axillary masses or adenopathy  CV: regular rate and rhythm, normal S1 S2, no S3 or S4, no murmur, click or rub, no peripheral edema and peripheral pulses strong  ABDOMEN: soft, nontender, no hepatosplenomegaly, no masses and bowel sounds normal  MS: no musculoskeletal defects are noted and gait is age appropriate without ataxia  SKIN: no suspicious lesions or rashes  NEURO: Normal strength and tone, sensory exam grossly normal, mentation intact and speech normal  PSYCH: mentation appears normal and affect normal/bright      ASSESSMENT/PLAN:   1. Routine general medical examination at a health care facility  - Comprehensive metabolic panel (BMP + Alb, Alk Phos, ALT, AST, Total. Bili, TP); Future  - Comprehensive metabolic panel (BMP + Alb, Alk Phos, ALT, AST, Total. Bili, TP)    2. Screening for hyperlipidemia  - Lipid panel reflex to direct LDL Fasting; Future  - Lipid panel reflex to direct LDL Fasting    3. Abnormal finding on diagnostic imaging of left kidney - unclear, will investigate lobulated left kidney with MRI imaging near future  - MR Renal wo & w Contrast; Future    4. Acquired hypothyroidism -  Surveillance labs today, refills  - levothyroxine (SYNTHROID/LEVOTHROID) 100 MCG tablet; Take 1 tablet (100 mcg) by mouth daily  Dispense: 90 tablet; Refill: 3  - TSH; Future  - T4, free; Future  - T3, total; Future  - TSH  - T4, free  - T3, total    5. Gastroesophageal reflux disease without esophagitis - stable, no new symptoms.       Patient has been advised of split billing requirements and indicates understanding: Yes     COUNSELING:  Reviewed preventive health counseling, as reflected in patient instructions    Estimated body mass index is 19.18 kg/m  as calculated from the following:    Height as of this  "encounter: 1.613 m (5' 3.5\").    Weight as of this encounter: 49.9 kg (110 lb).      She reports that she has never smoked. She has never used smokeless tobacco.    Maryana Brooks MD  [facility]  "

## 2021-08-18 LAB
ALBUMIN SERPL-MCNC: 3.8 G/DL (ref 3.4–5)
ALP SERPL-CCNC: 154 U/L (ref 40–150)
ALT SERPL W P-5'-P-CCNC: 47 U/L (ref 0–50)
ANION GAP SERPL CALCULATED.3IONS-SCNC: 5 MMOL/L (ref 3–14)
AST SERPL W P-5'-P-CCNC: 35 U/L (ref 0–45)
BILIRUB SERPL-MCNC: 0.3 MG/DL (ref 0.2–1.3)
BUN SERPL-MCNC: 20 MG/DL (ref 7–30)
CALCIUM SERPL-MCNC: 9.4 MG/DL (ref 8.5–10.1)
CHLORIDE BLD-SCNC: 106 MMOL/L (ref 94–109)
CHOLEST SERPL-MCNC: 222 MG/DL
CO2 SERPL-SCNC: 28 MMOL/L (ref 20–32)
CREAT SERPL-MCNC: 0.89 MG/DL (ref 0.52–1.04)
FASTING STATUS PATIENT QL REPORTED: YES
GFR SERPL CREATININE-BSD FRML MDRD: 72 ML/MIN/1.73M2
GLUCOSE BLD-MCNC: 162 MG/DL (ref 70–99)
HDLC SERPL-MCNC: 93 MG/DL
LDLC SERPL CALC-MCNC: 112 MG/DL
NONHDLC SERPL-MCNC: 129 MG/DL
POTASSIUM BLD-SCNC: 4.8 MMOL/L (ref 3.4–5.3)
PROT SERPL-MCNC: 7.5 G/DL (ref 6.8–8.8)
SODIUM SERPL-SCNC: 139 MMOL/L (ref 133–144)
T4 FREE SERPL-MCNC: 1.06 NG/DL (ref 0.76–1.46)
TRIGL SERPL-MCNC: 83 MG/DL
TSH SERPL DL<=0.005 MIU/L-ACNC: 2.75 MU/L (ref 0.4–4)

## 2021-08-19 DIAGNOSIS — E13.9 POST-PANCREATECTOMY DIABETES (H): ICD-10-CM

## 2021-08-19 DIAGNOSIS — E89.1 POST-PANCREATECTOMY DIABETES (H): ICD-10-CM

## 2021-08-19 DIAGNOSIS — Z90.410 POST-PANCREATECTOMY DIABETES (H): ICD-10-CM

## 2021-08-19 RX ORDER — PROCHLORPERAZINE 25 MG/1
SUPPOSITORY RECTAL
Qty: 3 EACH | Refills: 11 | Status: SHIPPED | OUTPATIENT
Start: 2021-08-19 | End: 2022-04-16

## 2021-08-19 RX ORDER — PROCHLORPERAZINE 25 MG/1
SUPPOSITORY RECTAL
Qty: 1 EACH | Refills: 3 | Status: SHIPPED | OUTPATIENT
Start: 2021-08-19 | End: 2022-04-16

## 2021-08-19 RX ORDER — PROCHLORPERAZINE 25 MG/1
SUPPOSITORY RECTAL
Qty: 1 EACH | Refills: 1 | Status: SHIPPED | OUTPATIENT
Start: 2021-08-19 | End: 2022-04-16

## 2021-08-20 ENCOUNTER — TELEPHONE (OUTPATIENT)
Dept: ENDOCRINOLOGY | Facility: CLINIC | Age: 56
End: 2021-08-20

## 2021-08-20 NOTE — TELEPHONE ENCOUNTER
Prior Authorization Approval    Authorization Effective Date: 8/20/2021  Authorization Expiration Date: 8/20/2022  Medication: (VIOKACE) 99795-34457 units TABS-PA approved  Approved Dose/Quantity:   Reference #: [id number]   Insurance Company: [employer] - Phone [phone] Fax [phone]  Expected CoPay:       CoPay Card Available:      Foundation Assistance Needed:    Which Pharmacy is filling the prescription (Not needed for infusion/clinic administered): [id number] - [place], [place] - [address]  Pharmacy Notified: Yes  Patient Notified: No-Pharmacy will contact

## 2021-08-20 NOTE — TELEPHONE ENCOUNTER
Central Prior Authorization Team   Phone: [phone]      PA Initiation    Medication: (VIOKACE) 12695-63712 units TABS-PA initiated  Insurance Company: [employer] - Phone [phone] Fax [phone]  Pharmacy Filling the Rx: [id number] [place], [place] - [address] [address]  Filling Pharmacy Phone: [phone]  Filling Pharmacy Fax:    Start Date: 8/20/2021

## 2021-08-20 NOTE — TELEPHONE ENCOUNTER
Prior Authorization Retail Medication Request    Medication/Dose: (VIOKACE) 93940-74576 units TABS  ICD code (if different than what is on RX):  Pancreatic insufficiency [K86.89]   Previously Tried and Failed:    Rationale:      Insurance Name: [employer]  Insurance ID:  [id number]    Pharmacy Information (if different than what is on RX)  Name:  [id number] [place], [place] - [address] [address]  Phone: [phone]

## 2021-08-26 ENCOUNTER — DOCUMENTATION ONLY (OUTPATIENT)
Dept: TRANSPLANT | Facility: CLINIC | Age: 56
End: 2021-08-26

## 2021-08-26 DIAGNOSIS — E89.1 POST-PANCREATECTOMY DIABETES (H): ICD-10-CM

## 2021-08-26 DIAGNOSIS — K90.9 INTESTINAL MALABSORPTION, UNSPECIFIED: ICD-10-CM

## 2021-08-26 DIAGNOSIS — K86.89 PANCREATIC INSUFFICIENCY: Primary | ICD-10-CM

## 2021-08-26 DIAGNOSIS — Z90.410 POST-PANCREATECTOMY DIABETES (H): ICD-10-CM

## 2021-08-26 DIAGNOSIS — E13.9 POST-PANCREATECTOMY DIABETES (H): ICD-10-CM

## 2021-08-30 ENCOUNTER — TRANSFERRED RECORDS (OUTPATIENT)
Dept: HEALTH INFORMATION MANAGEMENT | Facility: CLINIC | Age: 56
End: 2021-08-30

## 2021-09-03 ENCOUNTER — LAB (OUTPATIENT)
Dept: LAB | Facility: CLINIC | Age: 56
End: 2021-09-03
Payer: COMMERCIAL

## 2021-09-03 DIAGNOSIS — Z90.410 POST-PANCREATECTOMY DIABETES (H): ICD-10-CM

## 2021-09-03 DIAGNOSIS — E89.1 POST-PANCREATECTOMY DIABETES (H): ICD-10-CM

## 2021-09-03 DIAGNOSIS — K90.9 INTESTINAL MALABSORPTION, UNSPECIFIED: ICD-10-CM

## 2021-09-03 DIAGNOSIS — K86.89 PANCREATIC INSUFFICIENCY: ICD-10-CM

## 2021-09-03 DIAGNOSIS — E13.9 POST-PANCREATECTOMY DIABETES (H): ICD-10-CM

## 2021-09-03 LAB
ALBUMIN SERPL-MCNC: 3.5 G/DL (ref 3.4–5)
ALP SERPL-CCNC: 146 U/L (ref 40–150)
ALT SERPL W P-5'-P-CCNC: 51 U/L (ref 0–50)
ANION GAP SERPL CALCULATED.3IONS-SCNC: 5 MMOL/L (ref 3–14)
AST SERPL W P-5'-P-CCNC: 32 U/L (ref 0–45)
BASOPHILS # BLD AUTO: 0.1 10E3/UL (ref 0–0.2)
BASOPHILS NFR BLD AUTO: 1 %
BILIRUB SERPL-MCNC: 0.3 MG/DL (ref 0.2–1.3)
BUN SERPL-MCNC: 18 MG/DL (ref 7–30)
CALCIUM SERPL-MCNC: 8.7 MG/DL (ref 8.5–10.1)
CHLORIDE BLD-SCNC: 111 MMOL/L (ref 94–109)
CHOLEST SERPL-MCNC: 204 MG/DL
CO2 SERPL-SCNC: 27 MMOL/L (ref 20–32)
CREAT SERPL-MCNC: 0.81 MG/DL (ref 0.52–1.04)
EOSINOPHIL # BLD AUTO: 0.2 10E3/UL (ref 0–0.7)
EOSINOPHIL NFR BLD AUTO: 3 %
ERYTHROCYTE [DISTWIDTH] IN BLOOD BY AUTOMATED COUNT: 14.1 % (ref 10–15)
FASTING STATUS PATIENT QL REPORTED: YES
FERRITIN SERPL-MCNC: 280 NG/ML (ref 8–252)
GFR SERPL CREATININE-BSD FRML MDRD: 80 ML/MIN/1.73M2
GLUCOSE BLD-MCNC: 116 MG/DL (ref 70–99)
HBA1C MFR BLD: 7 % (ref 0–5.6)
HCT VFR BLD AUTO: 38 % (ref 35–47)
HDLC SERPL-MCNC: 102 MG/DL
HGB BLD-MCNC: 11.7 G/DL (ref 11.7–15.7)
IMM GRANULOCYTES # BLD: 0 10E3/UL
IMM GRANULOCYTES NFR BLD: 0 %
IRON SATN MFR SERPL: 37 % (ref 15–46)
IRON SERPL-MCNC: 97 UG/DL (ref 35–180)
LDLC SERPL CALC-MCNC: 80 MG/DL
LYMPHOCYTES # BLD AUTO: 1.7 10E3/UL (ref 0.8–5.3)
LYMPHOCYTES NFR BLD AUTO: 25 %
MCH RBC QN AUTO: 30.5 PG (ref 26.5–33)
MCHC RBC AUTO-ENTMCNC: 30.8 G/DL (ref 31.5–36.5)
MCV RBC AUTO: 99 FL (ref 78–100)
MONOCYTES # BLD AUTO: 0.6 10E3/UL (ref 0–1.3)
MONOCYTES NFR BLD AUTO: 9 %
NEUTROPHILS # BLD AUTO: 4.1 10E3/UL (ref 1.6–8.3)
NEUTROPHILS NFR BLD AUTO: 62 %
NONHDLC SERPL-MCNC: 102 MG/DL
NRBC # BLD AUTO: 0 10E3/UL
NRBC BLD AUTO-RTO: 0 /100
PLATELET # BLD AUTO: 401 10E3/UL (ref 150–450)
POTASSIUM BLD-SCNC: 4 MMOL/L (ref 3.4–5.3)
PREALB SERPL IA-MCNC: 21 MG/DL (ref 15–45)
PROT SERPL-MCNC: 7.2 G/DL (ref 6.8–8.8)
RBC # BLD AUTO: 3.83 10E6/UL (ref 3.8–5.2)
SODIUM SERPL-SCNC: 143 MMOL/L (ref 133–144)
TIBC SERPL-MCNC: 261 UG/DL (ref 240–430)
TRIGL SERPL-MCNC: 111 MG/DL
VIT B12 SERPL-MCNC: 341 PG/ML (ref 193–986)
WBC # BLD AUTO: 6.7 10E3/UL (ref 4–11)

## 2021-09-03 PROCEDURE — 82465 ASSAY BLD/SERUM CHOLESTEROL: CPT

## 2021-09-03 PROCEDURE — 82542 COL CHROMOTOGRAPHY QUAL/QUAN: CPT

## 2021-09-03 PROCEDURE — 84134 ASSAY OF PREALBUMIN: CPT

## 2021-09-03 PROCEDURE — 82607 VITAMIN B-12: CPT

## 2021-09-03 PROCEDURE — 84446 ASSAY OF VITAMIN E: CPT

## 2021-09-03 PROCEDURE — 84630 ASSAY OF ZINC: CPT

## 2021-09-03 PROCEDURE — 83550 IRON BINDING TEST: CPT

## 2021-09-03 PROCEDURE — 85025 COMPLETE CBC W/AUTO DIFF WBC: CPT

## 2021-09-03 PROCEDURE — 82728 ASSAY OF FERRITIN: CPT

## 2021-09-03 PROCEDURE — 82306 VITAMIN D 25 HYDROXY: CPT

## 2021-09-03 PROCEDURE — 80053 COMPREHEN METABOLIC PANEL: CPT

## 2021-09-03 PROCEDURE — 84590 ASSAY OF VITAMIN A: CPT

## 2021-09-03 PROCEDURE — 36415 COLL VENOUS BLD VENIPUNCTURE: CPT

## 2021-09-03 PROCEDURE — 83036 HEMOGLOBIN GLYCOSYLATED A1C: CPT

## 2021-09-03 PROCEDURE — 84681 ASSAY OF C-PEPTIDE: CPT

## 2021-09-04 ENCOUNTER — HEALTH MAINTENANCE LETTER (OUTPATIENT)
Age: 56
End: 2021-09-04

## 2021-09-06 LAB
A-TOCOPHEROL VIT E SERPL-MCNC: 11.2 MG/L
ANNOTATION COMMENT IMP: NORMAL
BETA+GAMMA TOCOPHEROL SERPL-MCNC: 1.5 MG/L
RETINYL PALMITATE SERPL-MCNC: <0.02 MG/L
VIT A SERPL-MCNC: 0.43 MG/L
ZINC SERPL-MCNC: 75.6 UG/DL

## 2021-09-07 DIAGNOSIS — K21.9 GASTROESOPHAGEAL REFLUX DISEASE WITHOUT ESOPHAGITIS: ICD-10-CM

## 2021-09-07 LAB — C PEPTIDE SERPL-MCNC: 0.3 NG/ML (ref 0.9–6.9)

## 2021-09-07 RX ORDER — OMEPRAZOLE 40 MG/1
40 CAPSULE, DELAYED RELEASE ORAL 2 TIMES DAILY
Qty: 180 CAPSULE | Refills: 3 | Status: SHIPPED | OUTPATIENT
Start: 2021-09-07 | End: 2022-10-25

## 2021-09-08 LAB
DEPRECATED CALCIDIOL+CALCIFEROL SERPL-MC: <54 UG/L (ref 20–75)
VITAMIN D2 SERPL-MCNC: <5 UG/L
VITAMIN D3 SERPL-MCNC: 49 UG/L

## 2021-09-09 ENCOUNTER — VIRTUAL VISIT (OUTPATIENT)
Dept: ONCOLOGY | Facility: CLINIC | Age: 56
End: 2021-09-09
Attending: INTERNAL MEDICINE
Payer: COMMERCIAL

## 2021-09-09 DIAGNOSIS — D50.8 OTHER IRON DEFICIENCY ANEMIA: Primary | ICD-10-CM

## 2021-09-09 LAB
A-LINOLENATE SERPL-SCNC: 55 NMOL/ML (ref 50–130)
AA SERPL-SCNC: 1433 NMOL/ML (ref 520–1490)
ARACHIDATE SERPL-SCNC: 38 NMOL/ML (ref 50–90)
CLINICAL BIOCHEMIST REVIEW: ABNORMAL
DHA SERPL-SCNC: 41 NMOL/ML (ref 30–250)
DOCOSAPENTAENATE W6 SERPL-SCNC: 29 NMOL/ML (ref 10–70)
DOCOSATETRAENOATE SERPL-SCNC: 61 NMOL/ML (ref 10–80)
DOCOSENOATE SERPL-SCNC: 5 NMOL/ML (ref 4–13)
DPA SERPL-SCNC: 62 NMOL/ML (ref 20–210)
EPA SERPL-SCNC: 62 NMOL/ML (ref 14–100)
FA SERPL-SCNC: 14.2 MMOL/L (ref 7.3–16.8)
G-LINOLENATE SERPL-SCNC: 143 NMOL/ML (ref 16–150)
HEXADECENOATE SERPL-SCNC: 93 NMOL/ML (ref 25–105)
HOMO-G LINOLENATE SERPL-SCNC: 218 NMOL/ML (ref 50–250)
LAURATE SERPL-SCNC: 53 NMOL/ML (ref 6–90)
LINOLEATE SERPL-SCNC: 3467 NMOL/ML (ref 2270–3850)
MEAD ACID SERPL-SCNC: 81 NMOL/ML (ref 7–30)
MONOUNSAT FA SERPL-SCNC: 3.7 MMOL/L (ref 1.3–5.8)
MYRISTATE SERPL-SCNC: 290 NMOL/ML (ref 30–450)
NERVONATE SERPL-SCNC: 112 NMOL/ML (ref 60–100)
OCTADECANOATE SERPL-SCNC: 1062 NMOL/ML (ref 590–1170)
OLEATE SERPL-SCNC: 2719 NMOL/ML (ref 650–3500)
PALMITATE SERPL-SCNC: 3231 NMOL/ML (ref 1480–3730)
PALMITOLEATE SERPL-SCNC: 514 NMOL/ML (ref 110–1130)
POLYUNSAT FA SERPL-SCNC: 5.7 MMOL/L (ref 3.2–5.8)
SAT FA SERPL-SCNC: 4.8 MMOL/L (ref 2.5–5.5)
TRIENOATE/AA SERPL-SRTO: 0.06 {RATIO} (ref 0.01–0.04)
VACCENATE SERPL-SCNC: 212 NMOL/ML (ref 280–740)
W3 FA SERPL-SCNC: 0.2 MMOL/L (ref 0.2–0.5)
W6 FA SERPL-SCNC: 5.4 MMOL/L (ref 3–5.4)

## 2021-09-09 PROCEDURE — 99213 OFFICE O/P EST LOW 20 MIN: CPT | Mod: 95 | Performed by: INTERNAL MEDICINE

## 2021-09-09 NOTE — LETTER
9/9/2021         RE: Lynn Thompson  [id number] [address] [id number]        Dear Colleague,    Thank you for referring your patient, Lynn Thompson, to the [facility]. Please see a copy of my visit note below.    Lynn is a 56 year old who is being evaluated via a billable video visit.      How would you like to obtain your AVS? MyChart  If the video visit is dropped, the invitation should be resent by: Text to cell phone: [phone]  Will anyone else be joining your video visit? No   Alicia Colvin CMA    Video Start Time: 10:04 AM  Video-Visit Details    Type of service:  Video Visit    Video End Time:10:10 AM    Originating Location (pt. 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       Lynn says that she has had anemia all of her life.  She was previously followed by Dr. Tom Malcolm at [facility] since 2004.  She was followed and treated for iron-deficiency anemia.  It was felt that it was due to poor absorption from her gastric bypass and various bowel surgeries in the past.  She was unable to tolerate oral and and could not absorb it.  She gets IV iron intermittently.  She says a round of IV iron would last her for several years.  She last saw Dr. Malcolm in 2019.      Patient has been anemic again.  She underwent colonoscopy on 4/1/21, which found some polyps  that were removed.  Due to unintentional weight loss, she underwent mammogram that was negative.  She had a CT abdomen/pelvis on 3/1/21 that was normal, other than non-specific proximal colitis.  She does not smoke.      She received Injectafer x 2 doses in May 2021.      INTERIM HISTORY: Lynn is doing well.  She says that she did feel better after her iron infusions.        REVIEW OF SYSTEMS:   14 point ROS was reviewed and is negative other than as noted above in HPI.       HOME MEDICATIONS:  Current Outpatient Medications   Medication Sig Dispense Refill     amylase-lipase-protease (VIOKACE) 20595-68545 units TABS tablet Take 4-5 with meals and 2 with snacks 500 tablet 11     calcium carbonate 600 mg-vitamin D 400 units (CALTRATE) 600-400 MG-UNIT per tablet Take 1 tablet by mouth daily       Continuous Blood Gluc  (DEXCOM G6 ) MODESTA Use as directed to check blood glucose levels 1 each 1     Continuous Blood Gluc Sensor (DEXCOM G6 SENSOR) MISC Change every 10 days 3 each 11     Continuous Blood Gluc Transmit (DEXCOM G6 TRANSMITTER) MISC Change every 3 months 1 each 3     DULoxetine (CYMBALTA) 20 MG capsule Take 20 mg by mouth daily       escitalopram (LEXAPRO) 20 MG tablet Take 20 mg by mouth daily       estradiol (ESTRACE) 0.1 MG/GM vaginal cream PLACE 2 GRAMS VAGINALLY 2 TIMES WEEKLY 42.5 g 1     famotidine (PEPCID) 40 MG tablet Take 1 tablet (40 mg) by mouth 2 times daily as needed for heartburn 180 tablet 3     gabapentin (NEURONTIN) 300 MG capsule Take 300 mg by mouth nightly as needed       glucose 40 % GEL Take 15-30 g by mouth every 15 minutes as needed. 1 Tube 11     hydrOXYzine (ATARAX) 25 MG tablet TAKE 1-2 TABLETS BY MOUTH 2 TIMES DAILY AS NEEDED FOR ANXIETY  0     insulin aspart (NOVOLOG PENFILL) 100 UNIT/ML cartridge Inject 0.5-2 units subcutaneously 4 times daily with meals and at bedtime 15 mL 11     insulin glargine (LANTUS PEN) 100 UNIT/ML pen Inject 6 units daily 15 mL 3      levothyroxine (SYNTHROID/LEVOTHROID) 100 MCG tablet Take 1 tablet (100 mcg) by mouth daily 90 tablet 3     loratadine (CLARITIN) 10 MG tablet Take 10 mg by mouth daily as needed        modafinil (PROVIGIL) 200 MG tablet Take 400 mg by mouth daily       omeprazole (PRILOSEC) 40 MG DR capsule Take 1 capsule (40 mg) by mouth 2 times daily Take 30-60 minutes before a meal. 180 capsule 3     ondansetron (ZOFRAN-ODT) 8 MG ODT tab Take 8 mg by mouth 3 times daily as needed for nausea       STATIN NOT PRESCRIBED (INTENTIONAL) Please choose reason not prescribed from choices below.       traZODone (DESYREL) 50 MG tablet Take 50 mg by mouth nightly as needed for sleep           ALLERGIES:  Allergies   Allergen Reactions     Corticosteroids Other (See Comments)     All oral, IV and injectable steroids are contraindicated (unless in life threatening situations) in Islet Auto transplant recipients. They can cause irreversible loss of islet cell function. Please contact patient's transplant care coordinator, Erlinda Multani RN BSN at [phone]/pager: [phone] and endocrinologist prior to administration.       Povidone Iodine Hives     Causes skin to blister     Naproxen      Other reaction(s): Abdominal Pain  Pt allergic to Naprosyn     Nsaids      naprosyn = GI upset     Povidone Iodine      blisters     Sulfasalazine Nausea and Nausea and Vomiting         PAST MEDICAL HISTORY:  Past Medical History:   Diagnosis Date     Benign paroxysmal positional vertigo     occ.      Calculus of kidney 05/2005    x1 on L side passed, several stones.  Has been tested for oxalate.     Chronic abdominal pain 07/17/2013     Chronic pain      Chronic pancreatitis 07/17/2013     Depression     also occ panic spells     Depressive disorder      Diabetes (H) 5/10/2013    Total Pancreatomy with Auto Islets Transplant     Dyspepsia 06/1999    H. pylori   treated     Headaches     still periodic HA's ;  often 5X/week     Hypertension  2016    Stress related     Iron deficiency anemia secondary to inadequate dietary iron intake 2003    relates to gastric bypass     Post-pancreatectomy diabetes melltius 2013     Sleep apnea     uses splint     Spasm of sphincter of Oddi     surgical + endoscopic stenting of pancreatic duct @ [facility] 06     Thyroid nodule 2016     Vaccination not carried out          PAST SURGICAL HISTORY:  Past Surgical History:   Procedure Laterality Date     ABDOMINOPLASTY  2002    Tummy tuck     APPENDECTOMY  1990     BUNIONECTOMY Right 1998     CBD Stent placement  2002    CBD stent; Dr. Presley      SECTION       CHOLECYSTECTOMY       COLONOSCOPY   &     colonoscopy     COLONOSCOPY       COLONOSCOPY N/A 3/31/2021    Procedure: COLONOSCOPY INCOMPLETE Aborted due to incomplete prep  will need to take additional prep and return tomorrow 21;  Surgeon: Ihsan Saenz MD;  Location: [facility]     COMBINED CYSTOSCOPY, RETROGRADES, URETEROSCOPY, LASER HOLMIUM LITHOTRIPSY URETER(S), INSERT STENT Right 2015    Procedure: COMBINED CYSTOSCOPY, RETROGRADES, URETEROSCOPY, LASER HOLMIUM LITHOTRIPSY URETER(S), INSERT STENT;  Surgeon: Kennedi Aldana MD;  Location: [facility]     COMBINED CYSTOSCOPY, RETROGRADES, URETEROSCOPY, LASER HOLMIUM LITHOTRIPSY URETER(S), INSERT STENT Right 2015    Procedure: COMBINED CYSTOSCOPY, RETROGRADES, URETEROSCOPY, LASER HOLMIUM LITHOTRIPSY URETER(S), INSERT STENT;  Surgeon: Kennedi Aldana MD;  Location: [facility]     COSMETIC SURGERY  2002    Tummy tuck     CYSTECTOMY OVARIAN BENIGN Right      CYSTOSCOPY, RETROGRADES, INSERT STENT URETER(S), COMBINED  10/02/2012    Procedure: COMBINED CYSTOSCOPY, RETROGRADES, INSERT STENT URETER(S);  COMBINED CYSTOSCOPY,  , INSERT LEFT STENT URETER;  Surgeon: Johny Baez MD;  Location: [facility]     ESOPHAGOSCOPY, GASTROSCOPY, DUODENOSCOPY (EGD), COMBINED N/A 2018    Procedure: COMBINED  ESOPHAGOSCOPY, GASTROSCOPY, DUODENOSCOPY (EGD);  ESOPHAGOSCOPY, GASTROSCOPY, DUODENOSCOPY (EGD)    ;  Surgeon: Tamir Rodgers MD;  Location: [facility]     EXTRACORPOREAL SHOCK WAVE LITHOTRIPSY (ESWL)  10/16/2012    Procedure: EXTRACORPOREAL SHOCK WAVE LITHOTRIPSY (ESWL);  left EXTRACORPOREAL SHOCK WAVE LITHOTRIPSY (ESWL) ;  Surgeon: Johny Baez MD;  Location: [facility]     Gastric bypass NOS       HERNIA REPAIR  02/2015     IRRIGATION AND DEBRIDEMENT HAND, COMBINED Left 10/30/2020    Procedure: Left hand sharp excisional debridement of skin, subcutaneous tissue and fat with a scalpel, 2 x 1 x 1 cm.;  Surgeon: Demian Renteria MD;  Location: [facility]     LAP, LYSIS OF ADHESIONS       LAPAROSCOPIC LYSIS ADHESIONS N/A 02/20/2015    Procedure: LAPAROSCOPIC LYSIS ADHESIONS;  Surgeon: Aaron Early MD;  [facility]     LAPAROSCOPIC LYSIS ADHESIONS N/A 12/29/2015    Procedure: LAPAROSCOPIC LYSIS ADHESIONS;  Surgeon: Aaron Early MD;  [facility]     PANCREATECTOMY, TRANSPLANT AUTO ISLET CELL, COMBINED  05/10/2013    Procedure: COMBINED PANCREATECTOMY, TRANSPLANT AUTO ISLET CELL;  Pancreatectomy, Auto Islet Cell Transplant   hernia repair, jejunostomy tube and liver biopsies with Anesthesia General with block;  Surgeon: Aaron Early MD;  [facility]: [facility]     Partial ileum resection  1992     REPAIR PTOSIS BROW BILATERAL Bilateral 06/09/2020    Procedure: BILATERAL BROW PTOSIS REPAIR;  Surgeon: Denise Alberts MD;  Location:  OR     Surgery for SBO  2015     TONSILLECTOMY, ADENOIDECTOMY, COMBINED  1997     TRANSPLANT  5/10/13    Pancreatic Auto-Islet Transplant         SOCIAL HISTORY:  Social History     Socioeconomic History     Marital status:      Spouse name: Tera     Number of children: 2     Years of education: Not on file     Highest education level: Some college, no degree   Occupational History     Occupation: customer service     Employer: [employer]   Tobacco Use     Smoking  status: Never Smoker     Smokeless tobacco: Never Used   Substance and Sexual Activity     Alcohol use: Not Currently     Alcohol/week: 0.0 standard drinks     Drug use: Not Currently     Sexual activity: Yes     Partners: Male     Birth control/protection: Post-menopausal   Other Topics Concern     Parent/sibling w/ CABG, MI or angioplasty before 65F 55M? No   Social History Narrative     Not on file     Social Determinants of Health     Financial Resource Strain: High Risk     Difficulty of Paying Living Expenses: Hard   Food Insecurity: Food Insecurity Present     Worried About Running Out of Food in the Last Year: Sometimes true     Ran Out of Food in the Last Year: Never true   Transportation Needs: No Transportation Needs     Lack of Transportation (Medical): No     Lack of Transportation (Non-Medical): No   Physical Activity:      Days of Exercise per Week:      Minutes of Exercise per Session:    Stress:      Feeling of Stress :    Social Connections:      Frequency of Communication with Friends and Family:      Frequency of Social Gatherings with Friends and Family:      Attends Sabianist Services:      Active Member of Clubs or Organizations:      Attends Club or Organization Meetings:      Marital Status:    Intimate Partner Violence: Not At Risk     Fear of Current or Ex-Partner: No     Emotionally Abused: No     Physically Abused: No     Sexually Abused: No         FAMILY HISTORY:  Family History   Problem Relation Age of Onset     Family History Negative Mother      Respiratory Father         COPD;  at 69     Genitourinary Problems Father         kidney stones     Substance Abuse Father      Depression Father      Asthma Father      Heart Disease Paternal Grandfather         M.I.     Coronary Artery Disease Paternal Grandfather      Hyperlipidemia Paternal Grandfather      Genitourinary Problems Brother         multiple brothers with kidney stones     Gastrointestinal Disease Maternal Grandmother          undiagnosed 'gut' issues     Coronary Artery Disease Maternal Grandfather      Hyperlipidemia Maternal Grandfather      Cerebrovascular Disease Paternal Grandmother         At the age of 103     Anxiety Disorder Paternal Grandmother      Osteoporosis Paternal Grandmother      Anxiety Disorder Son      Anxiety Disorder Daughter      Asthma Daughter      Colon Cancer No family hx of          PHYSICAL EXAM:  ECO  GENERAL/CONSTITUTIONAL: No acute distress. Healthy, alert.  RESPIRATORY: No audible wheeze, cough, or visible cyanosis.  No visible retractions or increased work of breathing.  Able to speak fully in complete sentences.  NEUROLOGIC: Alert, oriented, answers questions appropriately. No tremor. Mentation intact and speech normal  INTEGUMENTARY: No jaundice.    PSYCHIATRIC:  Mentation appears normal, affect normal/bright, judgement and insight intact, normal speech and appearance well-groomed.    The rest of a comprehensive physical exam is deferred due to public health emergency video visit restrictions.        LABS:  CBC RESULTS: Recent Labs   Lab Test 21  1244   WBC 6.7   RBC 3.83   HGB 11.7   HCT 38.0   MCV 99   MCH 30.5   MCHC 30.8*   RDW 14.1        Component      Latest Ref Rng & Units 2020 2021 5/10/2021 9/3/2021   Iron      35 - 180 ug/dL 71 38 65 97   Iron Binding Cap      240 - 430 ug/dL 285 365 378 261   Iron Saturation Index      15 - 46 % 25 10 (L) 17 37   % Retic      0.5 - 2.0 %   1.3    Absolute Retic      25 - 95 10e9/L   49.5    Ferritin      8 - 252 ng/mL 30  7 (L) 280 (H)   Vitamin B12      193 - 986 pg/mL 290 278 274 341   Folate      >5.4 ng/mL   18.6          ASSESSMENT/PLAN:  Lynn Thompson is a 56 year old female with:    1) Iron-deficiency anemia: chronic, likely related to poor absorption from her history of gastric bypass and bowel surgeries.  She had a colonoscopy on 21 that showed no bleeding.  She is unable to tolerate oral iron due to side  effects and poor absorption.  She does get IV iron intermittently.      She last received Injectafer in May 2021, with normalization of her hemoglobin and iron.  She felt better afterwards as well.    -RTC in ~6 months with repeat CBC, ferritin, iron.  She will let us know if she feels symptomatic prior to that if she needs to check labs earlier, especially if she does undergo surgery in the next few months.    2) Rectal prolapse:  -saw ob/gyn  -she is going to see colorectal surgery     3) Unintentional weight loss: saw PCP, and also followed up with Dr. Robles, getting some lab work-up and medication changes to see if that helps.  She had mammogram, colonoscopy, CT scan that showed no evidence of malignancy.      4) Thrombocytosis: likely was reactive, due to iron deficiency.  Resolved now, after iron replacement.  -will monitor CBC         Liliya Urrutia MD  Hematology/Oncology  [facility]    Total time spent on day of visit, including review of tests, obtaining/reviewing separately obtained history, ordering medications/tests/procedures, communicating with PCP/consultants, and documenting in electronic medical record: 20 minutes        Again, thank you for allowing me to participate in the care of your patient.        Sincerely,        Liliya Urrutia MD

## 2021-09-09 NOTE — PROGRESS NOTES
Lynn is a 56 year old who is being evaluated via a billable video visit.      How would you like to obtain your AVS? MyChart  If the video visit is dropped, the invitation should be resent by: Text to cell phone: [phone]  Will anyone else be joining your video visit? No   Alicia Colvin CMA    Video Start Time: 10:04 AM  Video-Visit Details    Type of service:  Video Visit    Video End Time:10:10 AM    Originating Location (pt. Location): Home    Distant Location (provider location):  [facility]     Platform used for Video Visit: Integrated biometrics         [facility]    Hematology/Oncology Established Patient Note      Today's Date: 09/09/21    Reason for Follow-up: anemia      HISTORY OF PRESENT ILLNESS: Lynn Thompson is a 56 year old female with PMHx of total pancreatectomy, islet cell autotransplant, splenectomy on 5/10/13, post pancreatectomy diabetes, gastric bypass in 2001, surgery for small bowel obstruction, history of Crohn's disease s/p partial terminal ileum resection, who presents with anemia.       Lynn says that she has had anemia all of her life.  She was previously followed by Dr. Tom Malcolm at [facility] since 2004.  She was followed and treated for iron-deficiency anemia.  It was felt that it was due to poor absorption from her gastric bypass and various bowel surgeries in the past.  She was unable to tolerate oral and and could not absorb it.  She gets IV iron intermittently.  She says a round of IV iron would last her for several years.  She last saw Dr. Malcolm in 2019.      Patient has been anemic again.  She underwent colonoscopy on 4/1/21, which found some polyps that were removed.  Due to unintentional weight loss, she underwent mammogram that was negative.  She had a CT abdomen/pelvis on 3/1/21 that was normal, other than non-specific proximal colitis.  She does not smoke.      She received Injectafer x 2 doses in May  2021.      INTERIM HISTORY: Lynn is doing well.  She says that she did feel better after her iron infusions.        REVIEW OF SYSTEMS:   14 point ROS was reviewed and is negative other than as noted above in HPI.       HOME MEDICATIONS:  Current Outpatient Medications   Medication Sig Dispense Refill     amylase-lipase-protease (VIOKACE) 39265-45361 units TABS tablet Take 4-5 with meals and 2 with snacks 500 tablet 11     calcium carbonate 600 mg-vitamin D 400 units (CALTRATE) 600-400 MG-UNIT per tablet Take 1 tablet by mouth daily       Continuous Blood Gluc  (DEXCOM G6 ) MODESTA Use as directed to check blood glucose levels 1 each 1     Continuous Blood Gluc Sensor (DEXCOM G6 SENSOR) MISC Change every 10 days 3 each 11     Continuous Blood Gluc Transmit (DEXCOM G6 TRANSMITTER) MISC Change every 3 months 1 each 3     DULoxetine (CYMBALTA) 20 MG capsule Take 20 mg by mouth daily       escitalopram (LEXAPRO) 20 MG tablet Take 20 mg by mouth daily       estradiol (ESTRACE) 0.1 MG/GM vaginal cream PLACE 2 GRAMS VAGINALLY 2 TIMES WEEKLY 42.5 g 1     famotidine (PEPCID) 40 MG tablet Take 1 tablet (40 mg) by mouth 2 times daily as needed for heartburn 180 tablet 3     gabapentin (NEURONTIN) 300 MG capsule Take 300 mg by mouth nightly as needed       glucose 40 % GEL Take 15-30 g by mouth every 15 minutes as needed. 1 Tube 11     hydrOXYzine (ATARAX) 25 MG tablet TAKE 1-2 TABLETS BY MOUTH 2 TIMES DAILY AS NEEDED FOR ANXIETY  0     insulin aspart (NOVOLOG PENFILL) 100 UNIT/ML cartridge Inject 0.5-2 units subcutaneously 4 times daily with meals and at bedtime 15 mL 11     insulin glargine (LANTUS PEN) 100 UNIT/ML pen Inject 6 units daily 15 mL 3     levothyroxine (SYNTHROID/LEVOTHROID) 100 MCG tablet Take 1 tablet (100 mcg) by mouth daily 90 tablet 3     loratadine (CLARITIN) 10 MG tablet Take 10 mg by mouth daily as needed        modafinil (PROVIGIL) 200 MG tablet Take 400 mg by mouth daily       omeprazole  (PRILOSEC) 40 MG DR capsule Take 1 capsule (40 mg) by mouth 2 times daily Take 30-60 minutes before a meal. 180 capsule 3     ondansetron (ZOFRAN-ODT) 8 MG ODT tab Take 8 mg by mouth 3 times daily as needed for nausea       STATIN NOT PRESCRIBED (INTENTIONAL) Please choose reason not prescribed from choices below.       traZODone (DESYREL) 50 MG tablet Take 50 mg by mouth nightly as needed for sleep           ALLERGIES:  Allergies   Allergen Reactions     Corticosteroids Other (See Comments)     All oral, IV and injectable steroids are contraindicated (unless in life threatening situations) in Islet Auto transplant recipients. They can cause irreversible loss of islet cell function. Please contact patient's transplant care coordinator, Erlinda Multani RN BSN at [phone]/pager: [phone] and endocrinologist prior to administration.       Povidone Iodine Hives     Causes skin to blister     Naproxen      Other reaction(s): Abdominal Pain  Pt allergic to Naprosyn     Nsaids      naprosyn = GI upset     Povidone Iodine      blisters     Sulfasalazine Nausea and Nausea and Vomiting         PAST MEDICAL HISTORY:  Past Medical History:   Diagnosis Date     Benign paroxysmal positional vertigo     occ.      Calculus of kidney 05/2005    x1 on L side passed, several stones.  Has been tested for oxalate.     Chronic abdominal pain 07/17/2013     Chronic pain      Chronic pancreatitis 07/17/2013     Depression     also occ panic spells     Depressive disorder      Diabetes (H) 5/10/2013    Total Pancreatomy with Auto Islets Transplant     Dyspepsia 06/1999    H. pylori   treated     Headaches     still periodic HA's ;  often 5X/week     Hypertension 02/22/2016    Stress related     Iron deficiency anemia secondary to inadequate dietary iron intake 11/2003    relates to gastric bypass     Post-pancreatectomy diabetes melltius 05/17/2013     Sleep apnea     uses splint     Spasm of sphincter of Oddi     surgical +  endoscopic stenting of pancreatic duct [place] [facility] [place] 06     Thyroid nodule 2016     Vaccination not carried out          PAST SURGICAL HISTORY:  Past Surgical History:   Procedure Laterality Date     ABDOMINOPLASTY  2002    Tummy tuck     APPENDECTOMY  1990     BUNIONECTOMY Right 1998     CBD Stent placement  2002    CBD stent; Dr. Presley      SECTION       CHOLECYSTECTOMY       COLONOSCOPY   &     colonoscopy     COLONOSCOPY       COLONOSCOPY N/A 3/31/2021    Procedure: COLONOSCOPY INCOMPLETE Aborted due to incomplete prep  will need to take additional prep and return tomorrow 21;  Surgeon: Ihsan Saenz MD;  Location: [facility]     COMBINED CYSTOSCOPY, RETROGRADES, URETEROSCOPY, LASER HOLMIUM LITHOTRIPSY URETER(S), INSERT STENT Right 2015    Procedure: COMBINED CYSTOSCOPY, RETROGRADES, URETEROSCOPY, LASER HOLMIUM LITHOTRIPSY URETER(S), INSERT STENT;  Surgeon: Kennedi Aldana MD;  Location: [facility]     COMBINED CYSTOSCOPY, RETROGRADES, URETEROSCOPY, LASER HOLMIUM LITHOTRIPSY URETER(S), INSERT STENT Right 2015    Procedure: COMBINED CYSTOSCOPY, RETROGRADES, URETEROSCOPY, LASER HOLMIUM LITHOTRIPSY URETER(S), INSERT STENT;  Surgeon: Kennedi Aldana MD;  Location: [facility] OR     COSMETIC SURGERY  2002    Tummy tuck     CYSTECTOMY OVARIAN BENIGN Right      CYSTOSCOPY, RETROGRADES, INSERT STENT URETER(S), COMBINED  10/02/2012    Procedure: COMBINED CYSTOSCOPY, RETROGRADES, INSERT STENT URETER(S);  COMBINED CYSTOSCOPY,  , INSERT LEFT STENT URETER;  Surgeon: Johny Baez MD;  Location:  OR     ESOPHAGOSCOPY, GASTROSCOPY, DUODENOSCOPY (EGD), COMBINED N/A 2018    Procedure: COMBINED ESOPHAGOSCOPY, GASTROSCOPY, DUODENOSCOPY (EGD);  ESOPHAGOSCOPY, GASTROSCOPY, DUODENOSCOPY (EGD)    ;  Surgeon: Tamir Rodgers MD;  Location:  GI     EXTRACORPOREAL SHOCK WAVE LITHOTRIPSY (ESWL)  10/16/2012    Procedure: EXTRACORPOREAL SHOCK WAVE LITHOTRIPSY  (ESWL);  left EXTRACORPOREAL SHOCK WAVE LITHOTRIPSY (ESWL) ;  Surgeon: Johny Baez MD;  Location: [facility] OR     Gastric bypass NOS       HERNIA REPAIR  02/2015     IRRIGATION AND DEBRIDEMENT HAND, COMBINED Left 10/30/2020    Procedure: Left hand sharp excisional debridement of skin, subcutaneous tissue and fat with a scalpel, 2 x 1 x 1 cm.;  Surgeon: Demian Renteria MD;  Location: [facility]     LAP, LYSIS OF ADHESIONS       LAPAROSCOPIC LYSIS ADHESIONS N/A 02/20/2015    Procedure: LAPAROSCOPIC LYSIS ADHESIONS;  Surgeon: Aaron Early MD;  [facility]     LAPAROSCOPIC LYSIS ADHESIONS N/A 12/29/2015    Procedure: LAPAROSCOPIC LYSIS ADHESIONS;  Surgeon: Aaron Early MD;  [facility]     PANCREATECTOMY, TRANSPLANT AUTO ISLET CELL, COMBINED  05/10/2013    Procedure: COMBINED PANCREATECTOMY, TRANSPLANT AUTO ISLET CELL;  Pancreatectomy, Auto Islet Cell Transplant   hernia repair, jejunostomy tube and liver biopsies with Anesthesia General with block;  Surgeon: Aaron Early MD;  Location: [facility]     Partial ileum resection  1992     REPAIR PTOSIS BROW BILATERAL Bilateral 06/09/2020    Procedure: BILATERAL BROW PTOSIS REPAIR;  Surgeon: Denise Alberts MD;  Location:  OR     Surgery for SBO  2015     TONSILLECTOMY, ADENOIDECTOMY, COMBINED  1997     TRANSPLANT  5/10/13    Pancreatic Auto-Islet Transplant         SOCIAL HISTORY:  Social History     Socioeconomic History     Marital status:      Spouse name: Tera     Number of children: 2     Years of education: Not on file     Highest education level: Some college, no degree   Occupational History     Occupation: customer service     Employer: [employer]   Tobacco Use     Smoking status: Never Smoker     Smokeless tobacco: Never Used   Substance and Sexual Activity     Alcohol use: Not Currently     Alcohol/week: 0.0 standard drinks     Drug use: Not Currently     Sexual activity: Yes     Partners: Male     Birth control/protection:  Post-menopausal   Other Topics Concern     Parent/sibling w/ CABG, MI or angioplasty before 65F 55M? No   Social History Narrative     Not on file     Social Determinants of Health     Financial Resource Strain: High Risk     Difficulty of Paying Living Expenses: Hard   Food Insecurity: Food Insecurity Present     Worried About Running Out of Food in the Last Year: Sometimes true     Ran Out of Food in the Last Year: Never true   Transportation Needs: No Transportation Needs     Lack of Transportation (Medical): No     Lack of Transportation (Non-Medical): No   Physical Activity:      Days of Exercise per Week:      Minutes of Exercise per Session:    Stress:      Feeling of Stress :    Social Connections:      Frequency of Communication with Friends and Family:      Frequency of Social Gatherings with Friends and Family:      Attends Nondenominational Services:      Active Member of Clubs or Organizations:      Attends Club or Organization Meetings:      Marital Status:    Intimate Partner Violence: Not At Risk     Fear of Current or Ex-Partner: No     Emotionally Abused: No     Physically Abused: No     Sexually Abused: No         FAMILY HISTORY:  Family History   Problem Relation Age of Onset     Family History Negative Mother      Respiratory Father         COPD;  at 69     Genitourinary Problems Father         kidney stones     Substance Abuse Father      Depression Father      Asthma Father      Heart Disease Paternal Grandfather         M.I.     Coronary Artery Disease Paternal Grandfather      Hyperlipidemia Paternal Grandfather      Genitourinary Problems Brother         multiple brothers with kidney stones     Gastrointestinal Disease Maternal Grandmother         undiagnosed 'gut' issues     Coronary Artery Disease Maternal Grandfather      Hyperlipidemia Maternal Grandfather      Cerebrovascular Disease Paternal Grandmother         At the age of 103     Anxiety Disorder Paternal Grandmother      Osteoporosis  Paternal Grandmother      Anxiety Disorder Son      Anxiety Disorder Daughter      Asthma Daughter      Colon Cancer No family hx of          PHYSICAL EXAM:  ECO  GENERAL/CONSTITUTIONAL: No acute distress. Healthy, alert.  RESPIRATORY: No audible wheeze, cough, or visible cyanosis.  No visible retractions or increased work of breathing.  Able to speak fully in complete sentences.  NEUROLOGIC: Alert, oriented, answers questions appropriately. No tremor. Mentation intact and speech normal  INTEGUMENTARY: No jaundice.    PSYCHIATRIC:  Mentation appears normal, affect normal/bright, judgement and insight intact, normal speech and appearance well-groomed.    The rest of a comprehensive physical exam is deferred due to public [facility] emergency video visit restrictions.        LABS:  CBC RESULTS: Recent Labs   Lab Test 21  1244   WBC 6.7   RBC 3.83   HGB 11.7   HCT 38.0   MCV 99   MCH 30.5   MCHC 30.8*   RDW 14.1        Component      Latest Ref Rng & Units 2020 2021 5/10/2021 9/3/2021   Iron      35 - 180 ug/dL 71 38 65 97   Iron Binding Cap      240 - 430 ug/dL 285 365 378 261   Iron Saturation Index      15 - 46 % 25 10 (L) 17 37   % Retic      0.5 - 2.0 %   1.3    Absolute Retic      25 - 95 10e9/L   49.5    Ferritin      8 - 252 ng/mL 30  7 (L) 280 (H)   Vitamin B12      193 - 986 pg/mL 290 278 274 341   Folate      >5.4 ng/mL   18.6          ASSESSMENT/PLAN:  Lynn Thompson is a 56 year old female with:    1) Iron-deficiency anemia: chronic, likely related to poor absorption from her history of gastric bypass and bowel surgeries.  She had a colonoscopy on 21 that showed no bleeding.  She is unable to tolerate oral iron due to side effects and poor absorption.  She does get IV iron intermittently.      She last received Injectafer in May 2021, with normalization of her hemoglobin and iron.  She felt better afterwards as well.    -RTC in ~6 months with repeat CBC, ferritin, iron.  She  will let us know if she feels symptomatic prior to that if she needs to check labs earlier, especially if she does undergo surgery in the next few months.    2) Rectal prolapse:  -saw ob/gyn  -she is going to see colorectal surgery     3) Unintentional weight loss: saw PCP, and also followed up with Dr. Robles, getting some lab work-up and medication changes to see if that helps.  She had mammogram, colonoscopy, CT scan that showed no evidence of malignancy.      4) Thrombocytosis: likely was reactive, due to iron deficiency.  Resolved now, after iron replacement.  -will monitor CBC         Liliya Urrutia MD  Hematology/Oncology  [facility]    Total time spent on day of visit, including review of tests, obtaining/reviewing separately obtained history, ordering medications/tests/procedures, communicating with PCP/consultants, and documenting in electronic medical record: 20 minutes

## 2021-09-09 NOTE — LETTER
9/9/2021         RE: Lynn Thompson  [address] [facility] [id number]        Dear Colleague,    Thank you for referring your patient, Lynn Thompson, to the [facility]. Please see a copy of my visit note below.    Lynn is a 56 year old who is being evaluated via a billable video visit.      How would you like to obtain your AVS? MyChart  If the video visit is dropped, the invitation should be resent by: Text to cell phone: [phone]  Will anyone else be joining your video visit? No   Alicia Colvin CMA    Video Start Time: 10:04 AM  Video-Visit Details    Type of service:  Video Visit    Video End Time:10:10 AM    Originating Location (pt. 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       Lynn says that she has had anemia all of her life.  She was previously followed by Dr. Tom Malcolm at [facility] since 2004.  She was followed and treated for iron-deficiency anemia.  It was felt that it was due to poor absorption from her gastric bypass and various bowel surgeries in the past.  She was unable to tolerate oral and and could not absorb it.  She gets IV iron intermittently.  She says a round of IV iron would last her for several years.  She last saw Dr. Malcolm in 2019.      Patient has been anemic again.  She underwent colonoscopy on 4/1/21, which found some polyps  that were removed.  Due to unintentional weight loss, she underwent mammogram that was negative.  She had a CT abdomen/pelvis on 3/1/21 that was normal, other than non-specific proximal colitis.  She does not smoke.      She received Injectafer x 2 doses in May 2021.      INTERIM HISTORY: Lynn is doing well.  She says that she did feel better after her iron infusions.        REVIEW OF SYSTEMS:   14 point ROS was reviewed and is negative other than as noted above in HPI.       HOME MEDICATIONS:  Current Outpatient Medications   Medication Sig Dispense Refill     amylase-lipase-protease (VIOKACE) 96954-10480 units TABS tablet Take 4-5 with meals and 2 with snacks 500 tablet 11     calcium carbonate 600 mg-vitamin D 400 units (CALTRATE) 600-400 MG-UNIT per tablet Take 1 tablet by mouth daily       Continuous Blood Gluc  (DEXCOM G6 ) MODESTA Use as directed to check blood glucose levels 1 each 1     Continuous Blood Gluc Sensor (DEXCOM G6 SENSOR) MISC Change every 10 days 3 each 11     Continuous Blood Gluc Transmit (DEXCOM G6 TRANSMITTER) MISC Change every 3 months 1 each 3     DULoxetine (CYMBALTA) 20 MG capsule Take 20 mg by mouth daily       escitalopram (LEXAPRO) 20 MG tablet Take 20 mg by mouth daily       estradiol (ESTRACE) 0.1 MG/GM vaginal cream PLACE 2 GRAMS VAGINALLY 2 TIMES WEEKLY 42.5 g 1     famotidine (PEPCID) 40 MG tablet Take 1 tablet (40 mg) by mouth 2 times daily as needed for heartburn 180 tablet 3     gabapentin (NEURONTIN) 300 MG capsule Take 300 mg by mouth nightly as needed       glucose 40 % GEL Take 15-30 g by mouth every 15 minutes as needed. 1 Tube 11     hydrOXYzine (ATARAX) 25 MG tablet TAKE 1-2 TABLETS BY MOUTH 2 TIMES DAILY AS NEEDED FOR ANXIETY  0     insulin aspart (NOVOLOG PENFILL) 100 UNIT/ML cartridge Inject 0.5-2 units subcutaneously 4 times daily with meals and at bedtime 15 mL 11     insulin glargine (LANTUS PEN) 100 UNIT/ML pen Inject 6 units daily 15 mL 3      levothyroxine (SYNTHROID/LEVOTHROID) 100 MCG tablet Take 1 tablet (100 mcg) by mouth daily 90 tablet 3     loratadine (CLARITIN) 10 MG tablet Take 10 mg by mouth daily as needed        modafinil (PROVIGIL) 200 MG tablet Take 400 mg by mouth daily       omeprazole (PRILOSEC) 40 MG DR capsule Take 1 capsule (40 mg) by mouth 2 times daily Take 30-60 minutes before a meal. 180 capsule 3     ondansetron (ZOFRAN-ODT) 8 MG ODT tab Take 8 mg by mouth 3 times daily as needed for nausea       STATIN NOT PRESCRIBED (INTENTIONAL) Please choose reason not prescribed from choices below.       traZODone (DESYREL) 50 MG tablet Take 50 mg by mouth nightly as needed for sleep           ALLERGIES:  Allergies   Allergen Reactions     Corticosteroids Other (See Comments)     All oral, IV and injectable steroids are contraindicated (unless in life threatening situations) in Islet Auto transplant recipients. They can cause irreversible loss of islet cell function. Please contact patient's transplant care coordinator, Erlinda Multani RN BSN at [phone]/pager: [phone] and endocrinologist prior to administration.       Povidone Iodine Hives     Causes skin to blister     Naproxen      Other reaction(s): Abdominal Pain  Pt allergic to Naprosyn     Nsaids      naprosyn = GI upset     Povidone Iodine      blisters     Sulfasalazine Nausea and Nausea and Vomiting         PAST MEDICAL HISTORY:  Past Medical History:   Diagnosis Date     Benign paroxysmal positional vertigo     occ.      Calculus of kidney 05/2005    x1 on L side passed, several stones.  Has been tested for oxalate.     Chronic abdominal pain 07/17/2013     Chronic pain      Chronic pancreatitis 07/17/2013     Depression     also occ panic spells     Depressive disorder      Diabetes (H) 5/10/2013    Total Pancreatomy with Auto Islets Transplant     Dyspepsia 06/1999    H. pylori   treated     Headaches     still periodic HA's ;  often 5X/week     Hypertension  2016    Stress related     Iron deficiency anemia secondary to inadequate dietary iron intake 2003    relates to gastric bypass     Post-pancreatectomy diabetes melltius 2013     Sleep apnea     uses splint     Spasm of sphincter of Oddi     surgical + endoscopic stenting of pancreatic duct @ [facility] 06     Thyroid nodule 2016     Vaccination not carried out          PAST SURGICAL HISTORY:  Past Surgical History:   Procedure Laterality Date     ABDOMINOPLASTY  2002    Tummy tuck     APPENDECTOMY  1990     BUNIONECTOMY Right 1998     CBD Stent placement  2002    CBD stent; Dr. Presley      SECTION       CHOLECYSTECTOMY       COLONOSCOPY   &     colonoscopy     COLONOSCOPY       COLONOSCOPY N/A 3/31/2021    Procedure: COLONOSCOPY INCOMPLETE Aborted due to incomplete prep  will need to take additional prep and return tomorrow 21;  Surgeon: Ihsan Saenz MD;  Location: [facility]     COMBINED CYSTOSCOPY, RETROGRADES, URETEROSCOPY, LASER HOLMIUM LITHOTRIPSY URETER(S), INSERT STENT Right 2015    Procedure: COMBINED CYSTOSCOPY, RETROGRADES, URETEROSCOPY, LASER HOLMIUM LITHOTRIPSY URETER(S), INSERT STENT;  Surgeon: Kennedi Aldana MD;  Location: [facility]     COMBINED CYSTOSCOPY, RETROGRADES, URETEROSCOPY, LASER HOLMIUM LITHOTRIPSY URETER(S), INSERT STENT Right 2015    Procedure: COMBINED CYSTOSCOPY, RETROGRADES, URETEROSCOPY, LASER HOLMIUM LITHOTRIPSY URETER(S), INSERT STENT;  Surgeon: Kennedi Aldana MD;  Location: [facility]     COSMETIC SURGERY  2002    Tummy tuck     CYSTECTOMY OVARIAN BENIGN Right      CYSTOSCOPY, RETROGRADES, INSERT STENT URETER(S), COMBINED  10/02/2012    Procedure: COMBINED CYSTOSCOPY, RETROGRADES, INSERT STENT URETER(S);  COMBINED CYSTOSCOPY,  , INSERT LEFT STENT URETER;  Surgeon: Johny Baez MD;  Location: [facility]     ESOPHAGOSCOPY, GASTROSCOPY, DUODENOSCOPY (EGD), COMBINED N/A 2018    Procedure: COMBINED  ESOPHAGOSCOPY, GASTROSCOPY, DUODENOSCOPY (EGD);  ESOPHAGOSCOPY, GASTROSCOPY, DUODENOSCOPY (EGD)    ;  Surgeon: Tamir Rodgers MD;  Location: [facility]     EXTRACORPOREAL SHOCK WAVE LITHOTRIPSY (ESWL)  10/16/2012    Procedure: EXTRACORPOREAL SHOCK WAVE LITHOTRIPSY (ESWL);  left EXTRACORPOREAL SHOCK WAVE LITHOTRIPSY (ESWL) ;  Surgeon: Johny Baez MD;  Location: [facility]     Gastric bypass NOS       HERNIA REPAIR  02/2015     IRRIGATION AND DEBRIDEMENT HAND, COMBINED Left 10/30/2020    Procedure: Left hand sharp excisional debridement of skin, subcutaneous tissue and fat with a scalpel, 2 x 1 x 1 cm.;  Surgeon: Demian Renteria MD;  Location: [facility]     LAP, LYSIS OF ADHESIONS       LAPAROSCOPIC LYSIS ADHESIONS N/A 02/20/2015    Procedure: LAPAROSCOPIC LYSIS ADHESIONS;  Surgeon: Aaron Early MD;  [facility]     LAPAROSCOPIC LYSIS ADHESIONS N/A 12/29/2015    Procedure: LAPAROSCOPIC LYSIS ADHESIONS;  Surgeon: Aaron Early MD;  [facility]     PANCREATECTOMY, TRANSPLANT AUTO ISLET CELL, COMBINED  05/10/2013    Procedure: COMBINED PANCREATECTOMY, TRANSPLANT AUTO ISLET CELL;  Pancreatectomy, Auto Islet Cell Transplant   hernia repair, jejunostomy tube and liver biopsies with Anesthesia General with block;  Surgeon: Aaron Early MD;  [facility]: [facility]     Partial ileum resection  1992     REPAIR PTOSIS BROW BILATERAL Bilateral 06/09/2020    Procedure: BILATERAL BROW PTOSIS REPAIR;  Surgeon: Denise Alberts MD;  Location:  OR     Surgery for SBO  2015     TONSILLECTOMY, ADENOIDECTOMY, COMBINED  1997     TRANSPLANT  5/10/13    Pancreatic Auto-Islet Transplant         SOCIAL HISTORY:  Social History     Socioeconomic History     Marital status:      Spouse name: Tera     Number of children: 2     Years of education: Not on file     Highest education level: Some college, no degree   Occupational History     Occupation: customer service     Employer: [employer]   Tobacco Use     Smoking  status: Never Smoker     Smokeless tobacco: Never Used   Substance and Sexual Activity     Alcohol use: Not Currently     Alcohol/week: 0.0 standard drinks     Drug use: Not Currently     Sexual activity: Yes     Partners: Male     Birth control/protection: Post-menopausal   Other Topics Concern     Parent/sibling w/ CABG, MI or angioplasty before 65F 55M? No   Social History Narrative     Not on file     Social Determinants of Health     Financial Resource Strain: High Risk     Difficulty of Paying Living Expenses: Hard   Food Insecurity: Food Insecurity Present     Worried About Running Out of Food in the Last Year: Sometimes true     Ran Out of Food in the Last Year: Never true   Transportation Needs: No Transportation Needs     Lack of Transportation (Medical): No     Lack of Transportation (Non-Medical): No   Physical Activity:      Days of Exercise per Week:      Minutes of Exercise per Session:    Stress:      Feeling of Stress :    Social Connections:      Frequency of Communication with Friends and Family:      Frequency of Social Gatherings with Friends and Family:      Attends Cheondoism Services:      Active Member of Clubs or Organizations:      Attends Club or Organization Meetings:      Marital Status:    Intimate Partner Violence: Not At Risk     Fear of Current or Ex-Partner: No     Emotionally Abused: No     Physically Abused: No     Sexually Abused: No         FAMILY HISTORY:  Family History   Problem Relation Age of Onset     Family History Negative Mother      Respiratory Father         COPD;  at 69     Genitourinary Problems Father         kidney stones     Substance Abuse Father      Depression Father      Asthma Father      Heart Disease Paternal Grandfather         M.I.     Coronary Artery Disease Paternal Grandfather      Hyperlipidemia Paternal Grandfather      Genitourinary Problems Brother         multiple brothers with kidney stones     Gastrointestinal Disease Maternal Grandmother          undiagnosed 'gut' issues     Coronary Artery Disease Maternal Grandfather      Hyperlipidemia Maternal Grandfather      Cerebrovascular Disease Paternal Grandmother         At the age of 103     Anxiety Disorder Paternal Grandmother      Osteoporosis Paternal Grandmother      Anxiety Disorder Son      Anxiety Disorder Daughter      Asthma Daughter      Colon Cancer No family hx of          PHYSICAL EXAM:  ECO  GENERAL/CONSTITUTIONAL: No acute distress. Healthy, alert.  RESPIRATORY: No audible wheeze, cough, or visible cyanosis.  No visible retractions or increased work of breathing.  Able to speak fully in complete sentences.  NEUROLOGIC: Alert, oriented, answers questions appropriately. No tremor. Mentation intact and speech normal  INTEGUMENTARY: No jaundice.    PSYCHIATRIC:  Mentation appears normal, affect normal/bright, judgement and insight intact, normal speech and appearance well-groomed.    The rest of a comprehensive physical exam is deferred due to public health emergency video visit restrictions.        LABS:  CBC RESULTS: Recent Labs   Lab Test 21  1244   WBC 6.7   RBC 3.83   HGB 11.7   HCT 38.0   MCV 99   MCH 30.5   MCHC 30.8*   RDW 14.1        Component      Latest Ref Rng & Units 2020 2021 5/10/2021 9/3/2021   Iron      35 - 180 ug/dL 71 38 65 97   Iron Binding Cap      240 - 430 ug/dL 285 365 378 261   Iron Saturation Index      15 - 46 % 25 10 (L) 17 37   % Retic      0.5 - 2.0 %   1.3    Absolute Retic      25 - 95 10e9/L   49.5    Ferritin      8 - 252 ng/mL 30  7 (L) 280 (H)   Vitamin B12      193 - 986 pg/mL 290 278 274 341   Folate      >5.4 ng/mL   18.6          ASSESSMENT/PLAN:  Lynn Thompson is a 56 year old female with:    1) Iron-deficiency anemia: chronic, likely related to poor absorption from her history of gastric bypass and bowel surgeries.  She had a colonoscopy on 21 that showed no bleeding.  She is unable to tolerate oral iron due to side  effects and poor absorption.  She does get IV iron intermittently.      She last received Injectafer in May 2021, with normalization of her hemoglobin and iron.  She felt better afterwards as well.    -RTC in ~6 months with repeat CBC, ferritin, iron.  She will let us know if she feels symptomatic prior to that if she needs to check labs earlier, especially if she does undergo surgery in the next few months.    2) Rectal prolapse:  -saw ob/gyn  -she is going to see colorectal surgery     3) Unintentional weight loss: saw PCP, and also followed up with Dr. Robles, getting some lab work-up and medication changes to see if that helps.  She had mammogram, colonoscopy, CT scan that showed no evidence of malignancy.      4) Thrombocytosis: likely was reactive, due to iron deficiency.  Resolved now, after iron replacement.  -will monitor CBC         Liliya Urrutia MD  Hematology/Oncology  [facility]    Total time spent on day of visit, including review of tests, obtaining/reviewing separately obtained history, ordering medications/tests/procedures, communicating with PCP/consultants, and documenting in electronic medical record: 20 minutes        Again, thank you for allowing me to participate in the care of your patient.        Sincerely,        Liliya Urrutia MD

## 2021-09-10 NOTE — PATIENT INSTRUCTIONS
Labs scheduled 3/7  Appt with Dr. Urrutia scheduled 3/10  Domonique Phelan, RN, BSN, FABIOLA  RN Care Coordinator  [facility]  [phone]

## 2021-09-20 ENCOUNTER — HOSPITAL ENCOUNTER (OUTPATIENT)
Dept: MRI IMAGING | Facility: CLINIC | Age: 56
Discharge: HOME | End: 2021-09-20
Attending: FAMILY MEDICINE | Admitting: FAMILY MEDICINE
Payer: COMMERCIAL

## 2021-09-20 DIAGNOSIS — R93.422 ABNORMAL FINDING ON DIAGNOSTIC IMAGING OF LEFT KIDNEY: ICD-10-CM

## 2021-09-20 PROCEDURE — 255N000002 HC RX 255 OP 636: Performed by: FAMILY MEDICINE

## 2021-09-20 PROCEDURE — A9585 GADOBUTROL INJECTION: HCPCS | Performed by: FAMILY MEDICINE

## 2021-09-20 PROCEDURE — 74183 MRI ABD W/O CNTR FLWD CNTR: CPT

## 2021-09-20 RX ORDER — GADOBUTROL 604.72 MG/ML
7.5 INJECTION INTRAVENOUS ONCE
Status: COMPLETED | OUTPATIENT
Start: 2021-09-20 | End: 2021-09-20

## 2021-09-20 RX ADMIN — GADOBUTROL 5 ML: 604.72 INJECTION INTRAVENOUS at 07:43

## 2021-09-24 NOTE — PROGRESS NOTES
"Colon and Rectal Surgery Clinic Note    RE: Lynn Thompson.  : 1965.  FAUSTO: 2021.     Reason for visit: Full-thickness rectal prolapse.      HPI:     56-year-old female who was a previously evaluated by Sabrina Lopez NP in  of this year with evidence of full thickness rectal prolapse. HPI per her note:  \"Lynn has a history of total pancreatectomy with islet L autotransplant in , splenectomy, choledochoduodenostomy, gastric bypass, appendectomy, ileal resection in  for Crohn's disease, hernia repair, lysis of adhesions x2, and a small bowel obstruction in .  She reports that her Crohn's has been in remission for over 20 years and she has not needed to be on any medications for this.  About a year ago she had significant constipation and thinks she developed a rectal prolapse at that time.  This has been coming out about once every week to 2 weeks but lasts about 3 days.  She had difficulty getting it back in last week.  She has discomfort but no significant pain when it is out.  She is starting to have difficulty holding bowel movements.  She is leaking loose stools.  She provides several very good pictures of a significant full-thickness rectal prolapse.  She has never had any anorectal surgeries in the past.  She has had 1 vaginal birth of a 4 pound 3 ounce baby and did have an episiotomy.  She denies any urinary incontinence or vaginal or uterine prolapse symptoms\"    [facility] testing 21:      CT AP 3/1/21:  IMPRESSION:   1.  Findings consistent with a nonspecific proximal colitis. No free air.    Colonoscopy 21 with Dr. Saenz:  Findings:        The perianal and digital rectal examinations were normal.        The terminal ileum appeared normal.        There was evidence of a prior end-to-side ileo-colonic anastomosis in        the ascending colon. This was patent and was characterized by healthy        appearing mucosa.        A 4 mm polyp was found in the " "ascending colon. The polyp was sessile.        The polyp was removed with a cold snare. Resection and retrieval were        complete.        Two pedunculated polyps were found in the sigmoid colon. The polyps were        7 to 8 mm in size. These polyps were removed with a hot snare. Resection        and retrieval were complete.        The exam was otherwise without abnormality on direct and retroflexion        views.   FINAL DIAGNOSIS:   A.  Colon, ascending, polypectomy:   - Lymphoid aggregates.   - No evidence of hyperplastic change, adenoma or malignancy.     B.  Colon, sigmoid, polypectomies (2).   - Hyperplastic polyp (1).   - Consistent with colonic mucosubmucosal elongated polyp (1).   - Negative for adenoma and malignancy.     Malina Trevino has suffered with full-thickness rectal prolapse for many years.  It started when she was severely constipated before her pancreatectomy with islet autotransplant.  She was on high-dose narcotics for many years and eventually on Suboxone but now she is off narcotics.  She does not take any laxatives.  She has had multiple abdominal operations as you can see below on her past surgical history.  She is on low-dose insulin at this time although she is losing weight and she is seeing her regular endocrinologist for this.  She has a questionable diagnosis of Crohn's disease stemming back from the early 90s and underwent an ileocolic resection in 1991 or 1992 and after this she was essentially \"cured\".  She is not on any Crohn's medications and her last colonoscopy in April did not show any active colitis with an intact ileocolic anastomosis.  She has rectal prolapse with almost every bowel movement and this reduces spontaneously or with manual manipulation.  She has been fearful most recently because it does not go back in as readily with palpation.  She is also worried that her fecal incontinence has worsened significantly over the past year.  She does wake up occasionally with " stool in her underpants with very little awareness.  This is associated with fecal urgency and seepage.  She also acknowledges stress urinary incontinence and urinary urgency.    Medical history:  Past Medical History:   Diagnosis Date     Benign paroxysmal positional vertigo     occ.      Calculus of kidney 05/2005    x1 on L side passed, several stones.  Has been tested for oxalate.     Chronic abdominal pain 2013     Chronic pain      Chronic pancreatitis 2013     Depression     also occ panic spells     Depressive disorder      Diabetes (H) 5/10/2013    Total Pancreatomy with Auto Islets Transplant     Dyspepsia 1999    H. pylori   treated     Headaches     still periodic HA's ;  often 5X/week     Hypertension 2016    Stress related     Iron deficiency anemia secondary to inadequate dietary iron intake 2003    relates to gastric bypass     Post-pancreatectomy diabetes melltius 2013     Sleep apnea     uses splint     Spasm of sphincter of Oddi     [place] [facility] [place]     Thyroid nodule 2016     Vaccination not carried out        Surgical history:  Past Surgical History:   Procedure Laterality Date     ABDOMINOPLASTY  2002    Tummy tuck     APPENDECTOMY  1990     BUNIONECTOMY Right 1998     CBD Stent placement  2002    CBD stent; Dr. Presley      SECTION       CHOLECYSTECTOMY       COLONOSCOPY   &     colonoscopy     COLONOSCOPY       COLONOSCOPY N/A 3/31/2021    Procedure: COLONOSCOPY INCOMPLETE Aborted due to incomplete prep  will need to take additional prep and return tomorrow 21;  Surgeon: Ihsan Saenz MD;  Location: [facility]     COMBINED CYSTOSCOPY, RETROGRADES, URETEROSCOPY, LASER HOLMIUM LITHOTRIPSY URETER(S), INSERT STENT Right 2015    Procedure: COMBINED CYSTOSCOPY, RETROGRADES, URETEROSCOPY, LASER HOLMIUM LITHOTRIPSY URETER(S), INSERT STENT;  Surgeon: Kennedi Aldana MD;   Location: [facility]     COMBINED CYSTOSCOPY, RETROGRADES, URETEROSCOPY, LASER HOLMIUM LITHOTRIPSY URETER(S), INSERT STENT Right 04/20/2015    Procedure: COMBINED CYSTOSCOPY, RETROGRADES, URETEROSCOPY, LASER HOLMIUM LITHOTRIPSY URETER(S), INSERT STENT;  Surgeon: Kennedi Aldana MD;  Location: [facility]     COSMETIC SURGERY  6/24/2002    Tummy tuck     CYSTECTOMY OVARIAN BENIGN Right      CYSTOSCOPY, RETROGRADES, INSERT STENT URETER(S), COMBINED  10/02/2012    Procedure: COMBINED CYSTOSCOPY, RETROGRADES, INSERT STENT URETER(S);  COMBINED CYSTOSCOPY,  , INSERT LEFT STENT URETER;  Surgeon: Johny Baez MD;  Location: [facility]     ESOPHAGOSCOPY, GASTROSCOPY, DUODENOSCOPY (EGD), COMBINED N/A 01/24/2018    Procedure: COMBINED ESOPHAGOSCOPY, GASTROSCOPY, DUODENOSCOPY (EGD);  ESOPHAGOSCOPY, GASTROSCOPY, DUODENOSCOPY (EGD)    ;  Surgeon: Tamir Rodgers MD;  Location: [facility]     EXTRACORPOREAL SHOCK WAVE LITHOTRIPSY (ESWL)  10/16/2012    Procedure: EXTRACORPOREAL SHOCK WAVE LITHOTRIPSY (ESWL);  left EXTRACORPOREAL SHOCK WAVE LITHOTRIPSY (ESWL) ;  Surgeon: Johny Baez MD;  Location: [facility]     Gastric bypass NOS       HERNIA REPAIR  02/2015     IRRIGATION AND DEBRIDEMENT HAND, COMBINED Left 10/30/2020    Procedure: Left hand sharp excisional debridement of skin, subcutaneous tissue and fat with a scalpel, 2 x 1 x 1 cm.;  Surgeon: Demian Renteria MD;  Location: [facility]     LAP, LYSIS OF ADHESIONS       LAPAROSCOPIC LYSIS ADHESIONS N/A 02/20/2015    Procedure: LAPAROSCOPIC LYSIS ADHESIONS;  Surgeon: Aaron Early MD;  [facility]     LAPAROSCOPIC LYSIS ADHESIONS N/A 12/29/2015    Procedure: LAPAROSCOPIC LYSIS ADHESIONS;  Surgeon: Aaron Early MD;  [facility]     PANCREATECTOMY, TRANSPLANT AUTO ISLET CELL, COMBINED  05/10/2013    Procedure: COMBINED PANCREATECTOMY, TRANSPLANT AUTO ISLET CELL;  Pancreatectomy, Auto Islet Cell Transplant   hernia repair, jejunostomy tube and liver biopsies with Anesthesia  General with block;  Surgeon: Aaron Early MD;  Location: [facility]     Partial ileum resection       REPAIR PTOSIS BROW BILATERAL Bilateral 2020    Procedure: BILATERAL BROW PTOSIS REPAIR;  Surgeon: Denise Alberts MD;  Location:  OR     Surgery for SBO       TONSILLECTOMY, ADENOIDECTOMY, COMBINED  1997     TRANSPLANT  5/10/13    Pancreatic Auto-Islet Transplant       Family history:  Family History   Problem Relation Age of Onset     Family History Negative Mother      Respiratory Father         COPD;  at 69     Genitourinary Problems Father         kidney stones     Substance Abuse Father      Depression Father      Asthma Father      Heart Disease Paternal Grandfather         M.I.     Coronary Artery Disease Paternal Grandfather      Hyperlipidemia Paternal Grandfather      Genitourinary Problems Brother         multiple brothers with kidney stones     Gastrointestinal Disease Maternal Grandmother         undiagnosed 'gut' issues     Coronary Artery Disease Maternal Grandfather      Hyperlipidemia Maternal Grandfather      Cerebrovascular Disease Paternal Grandmother         At the age of 103     Anxiety Disorder Paternal Grandmother      Osteoporosis Paternal Grandmother      Anxiety Disorder Son      Anxiety Disorder Daughter      Asthma Daughter      Colon Cancer No family hx of        Medications:  Current Outpatient Medications   Medication Sig Dispense Refill     amylase-lipase-protease (VIOKACE) 54632-27452 units TABS tablet Take 4-5 with meals and 2 with snacks 500 tablet 11     calcium carbonate 600 mg-vitamin D 400 units (CALTRATE) 600-400 MG-UNIT per tablet Take 1 tablet by mouth daily       Continuous Blood Gluc  (DEXCOM G6 ) MODESTA Use as directed to check blood glucose levels 1 each 1     Continuous Blood Gluc Sensor (DEXCOM G6 SENSOR) MISC Change every 10 days 3 each 11     Continuous Blood Gluc Transmit (DEXCOM G6 TRANSMITTER) MISC Change every 3 months 1  "each 3     DULoxetine (CYMBALTA) 20 MG capsule Take 20 mg by mouth daily       escitalopram (LEXAPRO) 20 MG tablet Take 20 mg by mouth daily       estradiol (ESTRACE) 0.1 MG/GM vaginal cream PLACE 2 GRAMS VAGINALLY 2 TIMES WEEKLY 42.5 g 1     famotidine (PEPCID) 40 MG tablet Take 1 tablet (40 mg) by mouth 2 times daily as needed for heartburn 180 tablet 3     gabapentin (NEURONTIN) 300 MG capsule Take 300 mg by mouth nightly as needed       glucose 40 % GEL Take 15-30 g by mouth every 15 minutes as needed. 1 Tube 11     hydrOXYzine (ATARAX) 25 MG tablet TAKE 1-2 TABLETS BY MOUTH 2 TIMES DAILY AS NEEDED FOR ANXIETY  0     insulin aspart (NOVOLOG PENFILL) 100 UNIT/ML cartridge Inject 0.5-2 units subcutaneously 4 times daily with meals and at bedtime 15 mL 11     insulin glargine (LANTUS PEN) 100 UNIT/ML pen Inject 6 units daily 15 mL 3     levothyroxine (SYNTHROID/LEVOTHROID) 100 MCG tablet Take 1 tablet (100 mcg) by mouth daily 90 tablet 3     loratadine (CLARITIN) 10 MG tablet Take 10 mg by mouth daily as needed        modafinil (PROVIGIL) 200 MG tablet Take 400 mg by mouth daily       omeprazole (PRILOSEC) 40 MG DR capsule Take 1 capsule (40 mg) by mouth 2 times daily Take 30-60 minutes before a meal. 180 capsule 3     ondansetron (ZOFRAN-ODT) 8 MG ODT tab Take 8 mg by mouth 3 times daily as needed for nausea       STATIN NOT PRESCRIBED (INTENTIONAL) Please choose reason not prescribed from choices below.       traZODone (DESYREL) 50 MG tablet Take 50 mg by mouth nightly as needed for sleep       magnesium citrate solution Take 296 mLs by mouth once for 1 dose Start at 8 pm night before surgery, unless otherwise stated in \"Getting Ready for Surgery\" instruction 296 mL 0     metroNIDAZOLE (FLAGYL) 500 MG tablet Take 1 tablet (500 mg) by mouth every 6 hours At 8:00 am, 2:00 pm, 8:00 pm the day prior to your surgery with neomycin and zofran. 3 tablet 0     neomycin (MYCIFRADIN) 500 MG tablet Take 2 tablets (1,000 mg) " "by mouth every 6 hours At 8:00 am, 2:00 pm, 8:00 pm the day prior to your surgery with flagyl and zofran. 6 tablet 0     ondansetron (ZOFRAN) 4 MG tablet Take 1 tablet (4 mg) by mouth every 6 hours At 8:00 am, 2:00 pm, 8:00 pm the day prior to your surgery with neomycin and flagyl. 3 tablet 0     polyethylene glycol (MIRALAX) 17 g packet Take 238 g by mouth See Admin Instructions Starting at 4 pm night prior to surgery. Refer to \"Getting Ready for Surgery\" instructions. 14 packet 0       Allergies:  Allergies   Allergen Reactions     Corticosteroids Other (See Comments)     All oral, IV and injectable steroids are contraindicated (unless in life threatening situations) in Islet Auto transplant recipients. They can cause irreversible loss of islet cell function. Please contact patient's transplant care coordinator, Erlinda Multani RN BSN at [phone]/pager: [phone] and endocrinologist prior to administration.       Povidone Iodine Hives     Causes skin to blister     Naproxen      Other reaction(s): Abdominal Pain  Pt allergic to Naprosyn     Nsaids      naprosyn = GI upset     Povidone Iodine      blisters     Sulfasalazine Nausea and Nausea and Vomiting       Social history:   Social History     Tobacco Use     Smoking status: Never Smoker     Smokeless tobacco: Never Used   Substance Use Topics     Alcohol use: Not Currently     Alcohol/week: 0.0 standard drinks     Marital status: .    Physical Examination:  BP (!) 149/77 (BP Location: Left arm, Patient Position: Sitting, Cuff Size: Adult Regular)   Pulse 78   Ht 5' 3.5\"   Wt 116 lb 12.8 oz   LMP 12/19/2013   SpO2 98%   BMI 20.37 kg/m    General: Well hydrated. No acute distress.  Abdomen: Soft, NT. No inguinal adenopathy palpated.  Perianal external examination:  Perianal skin: intact.  Lesions: Anterior-based full-thickness rectal prolapse upon Valsalva.  This reduces spontaneously.  Eversion of buttocks: There was not evidence of an anal " fissure. Details: N/A.  Skin tags or external hemorrhoids: Yes: Small and nonthrombosed.  Digital rectal examination: Was performed.   Sphincter tone: Poor.  Palpable lesions: No.  Other: A large rectocele was appreciated on bearing down.  Bimanual examination: was not performed.    Investigations:  None.    Procedures:  None.    ASSESSMENT  56-year-old female with complex past medical and surgical history now presenting with symptomatic full-thickness rectal prolapse.  We had a long discussion with regards to the surgical options for full-thickness rectal prolapse.  Perineal and abdominal techniques were discussed.  We also discussed a third option of potential proctectomy with end colostomy given her progressive anorectal dysfunction and fecal incontinence.  I did mention to her that an abdominal approach, which she overall prefers, would be performed through an open laparotomy.  I would be quite hesitant to introduce mesh into the pelvis with a history of Crohn's disease although this may be necessary and the risk and benefits were thoroughly discussed.  I also discussed that given her poor vaginal apical support as well as urogenital prolapse symptoms, that I would refer her to one of our urogynecologist to potentially perform a hysterectomy and sacrocolpopexy at the same time.  All questions were answered to her satisfaction. Risks, benefits, and alternatives of operative treatment were thoroughly discussed with the patient, he/she understands these well and agrees to proceed.    PLAN  1.  High-fiber diet.  2.  Refer to Dr. Rivera for discussion of possible total abdominal hysterectomy with bilateral salpingo-oophorectomy and combo sacrocolpopexy.  Schedule open rectopexy with flexible sigmoidoscopy, possible ALLY/BSO with sacrocolpopexy.  Will need PAC, labs, and mechanical bowel prep with antibiotics.    45 minutes spent on the date of the encounter doing chart review, history and exam, documentation and further  activities as noted above.      Uriah Sheridan M.D., M.Sc.     Division of Colon and Rectal Surgery  [facility]    Referring Provider:  MITZY Dobbins CNP  [address]  [place] [address]     Primary Care Provider:  Maryana Brooks    CC:  Nicole Rivera MD.

## 2021-09-25 ASSESSMENT — ENCOUNTER SYMPTOMS
ABDOMINAL PAIN: 1
JAUNDICE: 0
BLOOD IN STOOL: 0
ALTERED TEMPERATURE REGULATION: 1
SPEECH CHANGE: 0
INCREASED ENERGY: 1
LOSS OF CONSCIOUSNESS: 0
WEIGHT LOSS: 1
DYSURIA: 0
TREMORS: 0
RECTAL PAIN: 1
MUSCLE CRAMPS: 1
HEARTBURN: 1
TINGLING: 0
HEADACHES: 1
STIFFNESS: 0
DIZZINESS: 1
NECK PAIN: 1
VOMITING: 1
FEVER: 0
SEIZURES: 0
MUSCLE WEAKNESS: 1
PARALYSIS: 0
DISTURBANCES IN COORDINATION: 1
DIARRHEA: 1
HEMATURIA: 0
CHILLS: 1
MYALGIAS: 1
HALLUCINATIONS: 0
BOWEL INCONTINENCE: 1
NUMBNESS: 0
FATIGUE: 1
WEAKNESS: 1
BACK PAIN: 0
ARTHRALGIAS: 1
NAUSEA: 1
CONSTIPATION: 0
BLOATING: 1
JOINT SWELLING: 0
FLANK PAIN: 1
DECREASED APPETITE: 1

## 2021-09-27 ENCOUNTER — OFFICE VISIT (OUTPATIENT)
Dept: SURGERY | Facility: CLINIC | Age: 56
End: 2021-09-27
Payer: COMMERCIAL

## 2021-09-27 VITALS
HEIGHT: 64 IN | OXYGEN SATURATION: 98 % | SYSTOLIC BLOOD PRESSURE: 149 MMHG | DIASTOLIC BLOOD PRESSURE: 77 MMHG | WEIGHT: 116.8 LBS | BODY MASS INDEX: 19.94 KG/M2 | HEART RATE: 78 BPM

## 2021-09-27 DIAGNOSIS — K62.3 RECTAL PROLAPSE: Primary | ICD-10-CM

## 2021-09-27 PROCEDURE — 99215 OFFICE O/P EST HI 40 MIN: CPT | Performed by: COLON & RECTAL SURGERY

## 2021-09-27 RX ORDER — ONDANSETRON 4 MG/1
4 TABLET, FILM COATED ORAL EVERY 6 HOURS
Qty: 3 TABLET | Refills: 0 | Status: SHIPPED | OUTPATIENT
Start: 2021-09-27 | End: 2022-05-08

## 2021-09-27 RX ORDER — NEOMYCIN SULFATE 500 MG/1
1000 TABLET ORAL EVERY 6 HOURS
Qty: 6 TABLET | Refills: 0 | Status: ON HOLD | OUTPATIENT
Start: 2021-09-27 | End: 2022-02-05

## 2021-09-27 RX ORDER — POLYETHYLENE GLYCOL 3350 17 G/17G
238 POWDER, FOR SOLUTION ORAL SEE ADMIN INSTRUCTIONS
Qty: 14 PACKET | Refills: 0 | Status: ON HOLD | OUTPATIENT
Start: 2021-09-27 | End: 2022-02-05

## 2021-09-27 RX ORDER — METRONIDAZOLE 500 MG/1
500 TABLET ORAL EVERY 6 HOURS
Qty: 3 TABLET | Refills: 0 | Status: ON HOLD | OUTPATIENT
Start: 2021-09-27 | End: 2022-02-05

## 2021-09-27 ASSESSMENT — MIFFLIN-ST. JEOR: SCORE: 1086.86

## 2021-09-27 ASSESSMENT — PAIN SCALES - GENERAL: PAINLEVEL: NO PAIN (0)

## 2021-09-27 NOTE — LETTER
"2021       RE: Lynn Thompson  [address] [facility] [address]     Dear Colleague,    Thank you for referring your patient, Lynn Thompson, to the [facility] [place] [facility]. Please see a copy of my visit note below.    Colon and Rectal Surgery Clinic Note    RE: Lynn Thompson.  : 1965.  FAUSTO: 2021.     Reason for visit: Full-thickness rectal prolapse.      HPI:     56-year-old female who was a previously evaluated by Sabrina Lopez NP in  of this year with evidence of full thickness rectal prolapse. HPI per her note:  \"Lynn has a history of total pancreatectomy with islet L autotransplant in , splenectomy, choledochoduodenostomy, gastric bypass, appendectomy, ileal resection in  for Crohn's disease, hernia repair, lysis of adhesions x2, and a small bowel obstruction in .  She reports that her Crohn's has been in remission for over 20 years and she has not needed to be on any medications for this.  About a year ago she had significant constipation and thinks she developed a rectal prolapse at that time.  This has been coming out about once every week to 2 weeks but lasts about 3 days.  She had difficulty getting it back in last week.  She has discomfort but no significant pain when it is out.  She is starting to have difficulty holding bowel movements.  She is leaking loose stools.  She provides several very good pictures of a significant full-thickness rectal prolapse.  She has never had any anorectal surgeries in the past.  She has had 1 vaginal birth of a 4 pound 3 ounce baby and did have an episiotomy.  She denies any urinary incontinence or vaginal or uterine prolapse symptoms\"    [facility] testing 21:      CT AP 3/1/21:  IMPRESSION:   1.  Findings consistent with a nonspecific proximal colitis. No free air.    Colonoscopy 21 with Dr." "Link:  Findings:        The perianal and digital rectal examinations were normal.        The terminal ileum appeared normal.        There was evidence of a prior end-to-side ileo-colonic anastomosis in        the ascending colon. This was patent and was characterized by healthy        appearing mucosa.        A 4 mm polyp was found in the ascending colon. The polyp was sessile.        The polyp was removed with a cold snare. Resection and retrieval were        complete.        Two pedunculated polyps were found in the sigmoid colon. The polyps were        7 to 8 mm in size. These polyps were removed with a hot snare. Resection        and retrieval were complete.        The exam was otherwise without abnormality on direct and retroflexion        views.   FINAL DIAGNOSIS:   A.  Colon, ascending, polypectomy:   - Lymphoid aggregates.   - No evidence of hyperplastic change, adenoma or malignancy.     B.  Colon, sigmoid, polypectomies (2).   - Hyperplastic polyp (1).   - Consistent with colonic mucosubmucosal elongated polyp (1).   - Negative for adenoma and malignancy.     Malina Trevino has suffered with full-thickness rectal prolapse for many years.  It started when she was severely constipated before her pancreatectomy with islet autotransplant.  She was on high-dose narcotics for many years and eventually on Suboxone but now she is off narcotics.  She does not take any laxatives.  She has had multiple abdominal operations as you can see below on her past surgical history.  She is on low-dose insulin at this time although she is losing weight and she is seeing her regular endocrinologist for this.  She has a questionable diagnosis of Crohn's disease stemming back from the early 90s and underwent an ileocolic resection in 1991 or 1992 and after this she was essentially \"cured\".  She is not on any Crohn's medications and her last colonoscopy in April did not show any active colitis with an intact ileocolic anastomosis.  She " has rectal prolapse with almost every bowel movement and this reduces spontaneously or with manual manipulation.  She has been fearful most recently because it does not go back in as readily with palpation.  She is also worried that her fecal incontinence has worsened significantly over the past year.  She does wake up occasionally with stool in her underpants with very little awareness.  This is associated with fecal urgency and seepage.  She also acknowledges stress urinary incontinence and urinary urgency.    Medical history:  Past Medical History:   Diagnosis Date     Benign paroxysmal positional vertigo     occ.      Calculus of kidney 05/2005    x1 on L side passed, several stones.  Has been tested for oxalate.     Chronic abdominal pain 2013     Chronic pain      Chronic pancreatitis 2013     Depression     also occ panic spells     Depressive disorder      Diabetes (H) 5/10/2013    Total Pancreatomy with Auto Islets Transplant     Dyspepsia 1999    H. pylori   treated     Headaches     still periodic HA's ;  often 5X/week     Hypertension 2016    Stress related     Iron deficiency anemia secondary to inadequate dietary iron intake 2003    relates to gastric bypass     Post-pancreatectomy diabetes melltius 2013     Sleep apnea     uses splint     Spasm of sphincter of Oddi     surgical + endoscopic stenting of pancreatic duct @ [facility] 06     Thyroid nodule 2016     Vaccination not carried out        Surgical history:  Past Surgical History:   Procedure Laterality Date     ABDOMINOPLASTY  2002    Tummy tuck     APPENDECTOMY       BUNIONECTOMY Right 1998     CBD Stent placement  2002    CBD stent; Dr. Presley      SECTION       CHOLECYSTECTOMY       COLONOSCOPY   &     colonoscopy     COLONOSCOPY       COLONOSCOPY N/A 3/31/2021    Procedure: COLONOSCOPY INCOMPLETE Aborted due to incomplete prep  will need to take additional prep and  return tomorrow 4/1/21;  Surgeon: Ihsan Saenz MD;  Location: [facility]     COMBINED CYSTOSCOPY, RETROGRADES, URETEROSCOPY, LASER HOLMIUM LITHOTRIPSY URETER(S), INSERT STENT Right 03/23/2015    Procedure: COMBINED CYSTOSCOPY, RETROGRADES, URETEROSCOPY, LASER HOLMIUM LITHOTRIPSY URETER(S), INSERT STENT;  Surgeon: Kennedi Aldana MD;  Location: [facility]     COMBINED CYSTOSCOPY, RETROGRADES, URETEROSCOPY, LASER HOLMIUM LITHOTRIPSY URETER(S), INSERT STENT Right 04/20/2015    Procedure: COMBINED CYSTOSCOPY, RETROGRADES, URETEROSCOPY, LASER HOLMIUM LITHOTRIPSY URETER(S), INSERT STENT;  Surgeon: Kennedi Aldana MD;  Location: [facility]     COSMETIC SURGERY  6/24/2002    Tummy tuck     CYSTECTOMY OVARIAN BENIGN Right      CYSTOSCOPY, RETROGRADES, INSERT STENT URETER(S), COMBINED  10/02/2012    Procedure: COMBINED CYSTOSCOPY, RETROGRADES, INSERT STENT URETER(S);  COMBINED CYSTOSCOPY,  , INSERT LEFT STENT URETER;  Surgeon: Johny Baez MD;  Location: [facility]     ESOPHAGOSCOPY, GASTROSCOPY, DUODENOSCOPY (EGD), COMBINED N/A 01/24/2018    Procedure: COMBINED ESOPHAGOSCOPY, GASTROSCOPY, DUODENOSCOPY (EGD);  ESOPHAGOSCOPY, GASTROSCOPY, DUODENOSCOPY (EGD)    ;  Surgeon: Tamir Rodgers MD;  Location: [facility]     EXTRACORPOREAL SHOCK WAVE LITHOTRIPSY (ESWL)  10/16/2012    Procedure: EXTRACORPOREAL SHOCK WAVE LITHOTRIPSY (ESWL);  left EXTRACORPOREAL SHOCK WAVE LITHOTRIPSY (ESWL) ;  Surgeon: Johny Baez MD;  Location: [facility]     Gastric bypass NOS       HERNIA REPAIR  02/2015     IRRIGATION AND DEBRIDEMENT HAND, COMBINED Left 10/30/2020    Procedure: Left hand sharp excisional debridement of skin, subcutaneous tissue and fat with a scalpel, 2 x 1 x 1 cm.;  Surgeon: Demian Renteria MD;  Location: [facility]     LAP, LYSIS OF ADHESIONS       LAPAROSCOPIC LYSIS ADHESIONS N/A 02/20/2015    Procedure: LAPAROSCOPIC LYSIS ADHESIONS;  Surgeon: Aaron Early MD;  [facility]     LAPAROSCOPIC LYSIS ADHESIONS N/A  2015    Procedure: LAPAROSCOPIC LYSIS ADHESIONS;  Surgeon: Aaron Early MD;  [facility]     PANCREATECTOMY, TRANSPLANT AUTO ISLET CELL, COMBINED  05/10/2013    Procedure: COMBINED PANCREATECTOMY, TRANSPLANT AUTO ISLET CELL;  Pancreatectomy, Auto Islet Cell Transplant   hernia repair, jejunostomy tube and liver biopsies with Anesthesia General with block;  Surgeon: Aaron Early MD;  [facility]     Partial ileum resection  1992     REPAIR PTOSIS BROW BILATERAL Bilateral 2020    Procedure: BILATERAL BROW PTOSIS REPAIR;  Surgeon: Denise Alberts MD;  Location:  OR     Surgery for SBO       TONSILLECTOMY, ADENOIDECTOMY, COMBINED  1997     TRANSPLANT  5/10/13    Pancreatic Auto-Islet Transplant       Family history:  Family History   Problem Relation Age of Onset     Family History Negative Mother      Respiratory Father         COPD;  at 69     Genitourinary Problems Father         kidney stones     Substance Abuse Father      Depression Father      Asthma Father      Heart Disease Paternal Grandfather         M.I.     Coronary Artery Disease Paternal Grandfather      Hyperlipidemia Paternal Grandfather      Genitourinary Problems Brother         multiple brothers with kidney stones     Gastrointestinal Disease Maternal Grandmother         undiagnosed 'gut' issues     Coronary Artery Disease Maternal Grandfather      Hyperlipidemia Maternal Grandfather      Cerebrovascular Disease Paternal Grandmother         At the age of 103     Anxiety Disorder Paternal Grandmother      Osteoporosis Paternal Grandmother      Anxiety Disorder Son      Anxiety Disorder Daughter      Asthma Daughter      Colon Cancer No family hx of        Medications:  Current Outpatient Medications   Medication Sig Dispense Refill     amylase-lipase-protease (VIOKACE) 43859-82795 units TABS tablet Take 4-5 with meals and 2 with snacks 500 tablet 11     calcium carbonate 600 mg-vitamin D 400 units (CALTRATE)  600-400 MG-UNIT per tablet Take 1 tablet by mouth daily       Continuous Blood Gluc  (DEXCOM G6 ) MODESTA Use as directed to check blood glucose levels 1 each 1     Continuous Blood Gluc Sensor (DEXCOM G6 SENSOR) MISC Change every 10 days 3 each 11     Continuous Blood Gluc Transmit (DEXCOM G6 TRANSMITTER) MISC Change every 3 months 1 each 3     DULoxetine (CYMBALTA) 20 MG capsule Take 20 mg by mouth daily       escitalopram (LEXAPRO) 20 MG tablet Take 20 mg by mouth daily       estradiol (ESTRACE) 0.1 MG/GM vaginal cream PLACE 2 GRAMS VAGINALLY 2 TIMES WEEKLY 42.5 g 1     famotidine (PEPCID) 40 MG tablet Take 1 tablet (40 mg) by mouth 2 times daily as needed for heartburn 180 tablet 3     gabapentin (NEURONTIN) 300 MG capsule Take 300 mg by mouth nightly as needed       glucose 40 % GEL Take 15-30 g by mouth every 15 minutes as needed. 1 Tube 11     hydrOXYzine (ATARAX) 25 MG tablet TAKE 1-2 TABLETS BY MOUTH 2 TIMES DAILY AS NEEDED FOR ANXIETY  0     insulin aspart (NOVOLOG PENFILL) 100 UNIT/ML cartridge Inject 0.5-2 units subcutaneously 4 times daily with meals and at bedtime 15 mL 11     insulin glargine (LANTUS PEN) 100 UNIT/ML pen Inject 6 units daily 15 mL 3     levothyroxine (SYNTHROID/LEVOTHROID) 100 MCG tablet Take 1 tablet (100 mcg) by mouth daily 90 tablet 3     loratadine (CLARITIN) 10 MG tablet Take 10 mg by mouth daily as needed        modafinil (PROVIGIL) 200 MG tablet Take 400 mg by mouth daily       omeprazole (PRILOSEC) 40 MG DR capsule Take 1 capsule (40 mg) by mouth 2 times daily Take 30-60 minutes before a meal. 180 capsule 3     ondansetron (ZOFRAN-ODT) 8 MG ODT tab Take 8 mg by mouth 3 times daily as needed for nausea       STATIN NOT PRESCRIBED (INTENTIONAL) Please choose reason not prescribed from choices below.       traZODone (DESYREL) 50 MG tablet Take 50 mg by mouth nightly as needed for sleep       magnesium citrate solution Take 296 mLs by mouth once for 1 dose Start at 8  "pm night before surgery, unless otherwise stated in \"Getting Ready for Surgery\" instruction 296 mL 0     metroNIDAZOLE (FLAGYL) 500 MG tablet Take 1 tablet (500 mg) by mouth every 6 hours At 8:00 am, 2:00 pm, 8:00 pm the day prior to your surgery with neomycin and zofran. 3 tablet 0     neomycin (MYCIFRADIN) 500 MG tablet Take 2 tablets (1,000 mg) by mouth every 6 hours At 8:00 am, 2:00 pm, 8:00 pm the day prior to your surgery with flagyl and zofran. 6 tablet 0     ondansetron (ZOFRAN) 4 MG tablet Take 1 tablet (4 mg) by mouth every 6 hours At 8:00 am, 2:00 pm, 8:00 pm the day prior to your surgery with neomycin and flagyl. 3 tablet 0     polyethylene glycol (MIRALAX) 17 g packet Take 238 g by mouth See Admin Instructions Starting at 4 pm night prior to surgery. Refer to \"Getting Ready for Surgery\" instructions. 14 packet 0       Allergies:  Allergies   Allergen Reactions     Corticosteroids Other (See Comments)     All oral, IV and injectable steroids are contraindicated (unless in life threatening situations) in Islet Auto transplant recipients. They can cause irreversible loss of islet cell function. Please contact patient's transplant care coordinator, Erlinda Multani RN BSN at [phone]/pager: [phone] and endocrinologist prior to administration.       Povidone Iodine Hives     Causes skin to blister     Naproxen      Other reaction(s): Abdominal Pain  Pt allergic to Naprosyn     Nsaids      naprosyn = GI upset     Povidone Iodine      blisters     Sulfasalazine Nausea and Nausea and Vomiting       Social history:   Social History     Tobacco Use     Smoking status: Never Smoker     Smokeless tobacco: Never Used   Substance Use Topics     Alcohol use: Not Currently     Alcohol/week: 0.0 standard drinks     Marital status: .    Physical Examination:  BP (!) 149/77 (BP Location: Left arm, Patient Position: Sitting, Cuff Size: Adult Regular)   Pulse 78   Ht 5' 3.5\"   Wt 116 lb 12.8 oz   LMP " 12/19/2013   SpO2 98%   BMI 20.37 kg/m    General: Well hydrated. No acute distress.  Abdomen: Soft, NT. No inguinal adenopathy palpated.  Perianal external examination:  Perianal skin: intact.  Lesions: Anterior-based full-thickness rectal prolapse upon Valsalva.  This reduces spontaneously.  Eversion of buttocks: There was not evidence of an anal fissure. Details: N/A.  Skin tags or external hemorrhoids: Yes: Small and nonthrombosed.  Digital rectal examination: Was performed.   Sphincter tone: Poor.  Palpable lesions: No.  Other: A large rectocele was appreciated on bearing down.  Bimanual examination: was not performed.    Investigations:  None.    Procedures:  None.    ASSESSMENT  56-year-old female with complex past medical and surgical history now presenting with symptomatic full-thickness rectal prolapse.  We had a long discussion with regards to the surgical options for full-thickness rectal prolapse.  Perineal and abdominal techniques were discussed.  We also discussed a third option of potential proctectomy with end colostomy given her progressive anorectal dysfunction and fecal incontinence.  I did mention to her that an abdominal approach, which she overall prefers, would be performed through an open laparotomy.  I would be quite hesitant to introduce mesh into the pelvis with a history of Crohn's disease although this may be necessary and the risk and benefits were thoroughly discussed.  I also discussed that given her poor vaginal apical support as well as urogenital prolapse symptoms, that I would refer her to one of our urogynecologist to potentially perform a hysterectomy and sacrocolpopexy at the same time.  All questions were answered to her satisfaction. Risks, benefits, and alternatives of operative treatment were thoroughly discussed with the patient, he/she understands these well and agrees to proceed.    PLAN  1.  High-fiber diet.  2.  Refer to Dr. Rivera for discussion of possible total  abdominal hysterectomy with bilateral salpingo-oophorectomy and combo sacrocolpopexy.  Schedule open rectopexy with flexible sigmoidoscopy, possible ALLY/BSO with sacrocolpopexy.  Will need PAC, labs, and mechanical bowel prep with antibiotics.    45 minutes spent on the date of the encounter doing chart review, history and exam, documentation and further activities as noted above.      Uriah Sheridan M.D., M.Sc.     Division of Colon and Rectal Surgery  [facility]    Referring Provider:  MITZY Dobbins CNP  [address]  [place] [address]     Primary Care Provider:  Maryana Brooks    CC:  Nicole Rivera MD.      Again, thank you for allowing me to participate in the care of your patient.      Sincerely,    Uriah Sheridan MD

## 2021-09-27 NOTE — PATIENT INSTRUCTIONS
Appointments you will need:  1. Covid test  2. Pre op physical - someone will call to schedule this for you  3. Please call Eulalia to schedule a date for surgery, and she will also help you set up your other appointments  4. Pre-op labs   5. We placed a referral for you to see Dr Rivera with urogynecology- someone should be calling you within 3-5 days to schedule an appointment  6. We ordered a bowel prep for you that you will have to do the day before surgery - we will mail you instructions on how to do this      Follow up:    Please call with any questions or concerns regarding your clinic visit today.    Radiology Appointments [phone]    Schedule Clinic Appointments [phone] # 1   M-F 7:30 - 5 pm    SATISH Khan [phone]    Clinic Fax Number [phone]    Surgery Scheduling [phone]    My Chart is available 24 hours a day and is a secure way to access your records and communicate with your care team.  I strongly recommend signing up if you haven't already done so, if you are comfortable with computers.  If you would like to inquire about this or are having problems with My Chart access, you may call [phone] or go online at [email or web address].  Please allow at least 24 hours for a response and extra time on weekends andolidays.

## 2021-09-27 NOTE — NURSING NOTE
"Chief Complaint   Patient presents with     Consult     rectal prolapse       Vitals:    09/27/21 1526   BP: (!) 149/77   BP Location: Left arm   Patient Position: Sitting   Cuff Size: Adult Regular   Pulse: 78   SpO2: 98%   Weight: 116 lb 12.8 oz   Height: 5' 3.5\"       Body mass index is 20.37 kg/m .    Sully Phelan CMA    "

## 2021-09-29 ENCOUNTER — PRE VISIT (OUTPATIENT)
Dept: UROLOGY | Facility: CLINIC | Age: 56
End: 2021-09-29

## 2021-10-03 ENCOUNTER — TELEPHONE (OUTPATIENT)
Dept: FAMILY MEDICINE | Facility: CLINIC | Age: 56
End: 2021-10-03

## 2021-10-03 NOTE — TELEPHONE ENCOUNTER
Prior Authorization Retail Medication Request    Medication/Dose: omeprazole (PRILOSEC) 40 MG DR capsule  ICD code (if different than what is on RX):  Previously Tried and Failed:  Rationale:    Insurance Name: UNKNOWN  Insurance ID: UNKNOWN    Pharmacy Information (if different than what is on RX)  Name: [facility]  Phone: [phone]    Please include previous medications tried and failed.  Please ask insurance for medications on formulary.

## 2021-10-05 NOTE — TELEPHONE ENCOUNTER
Central Prior Authorization Team   Phone: [phone]      PA Initiation    Medication: omeprazole (PRILOSEC) 40 MG DR capsule  Insurance Company: [employer] - Phone [phone] Fax [phone]  Pharmacy Filling the Rx:    Filling Pharmacy Phone: [phone]  Filling Pharmacy Fax:    Start Date: 10/5/2021

## 2021-10-06 DIAGNOSIS — E89.1 POST-PANCREATECTOMY DIABETES (H): Primary | ICD-10-CM

## 2021-10-06 DIAGNOSIS — Z90.410 POST-PANCREATECTOMY DIABETES (H): Primary | ICD-10-CM

## 2021-10-06 DIAGNOSIS — E13.9 POST-PANCREATECTOMY DIABETES (H): Primary | ICD-10-CM

## 2021-10-06 DIAGNOSIS — K90.9 INTESTINAL MALABSORPTION, UNSPECIFIED: ICD-10-CM

## 2021-10-06 NOTE — TELEPHONE ENCOUNTER
Prior Authorization Approval    Authorization Effective Date: 9/30/2021  Authorization Expiration Date: 9/30/2022  Medication: omeprazole (PRILOSEC) 40 MG DR capsule  Approved Dose/Quantity: UP TO 2 CAPSULES PER DAY  Reference #: [id number]   Insurance Company: [employer] - [email or web address] [phone] Fax [phone]  Expected CoPay:       CoPay Card Available: No    Foundation Assistance Needed:    Which Pharmacy is filling the prescription (Not needed for infusion/clinic administered): [id number] - [place], [place] - [address] [address]  Pharmacy Notified: Yes  Patient Notified: Yes

## 2021-10-07 ENCOUNTER — ALLIED HEALTH/NURSE VISIT (OUTPATIENT)
Dept: TRANSPLANT | Facility: CLINIC | Age: 56
End: 2021-10-07
Attending: PEDIATRICS
Payer: MEDICARE

## 2021-10-07 ENCOUNTER — LAB (OUTPATIENT)
Dept: LAB | Facility: CLINIC | Age: 56
End: 2021-10-07
Payer: COMMERCIAL

## 2021-10-07 VITALS — BODY MASS INDEX: 20.02 KG/M2 | WEIGHT: 114.8 LBS

## 2021-10-07 VITALS
BODY MASS INDEX: 20.03 KG/M2 | SYSTOLIC BLOOD PRESSURE: 155 MMHG | WEIGHT: 114.86 LBS | HEART RATE: 65 BPM | DIASTOLIC BLOOD PRESSURE: 78 MMHG | OXYGEN SATURATION: 98 %

## 2021-10-07 DIAGNOSIS — E89.1 POST-PANCREATECTOMY DIABETES (H): Primary | ICD-10-CM

## 2021-10-07 DIAGNOSIS — Z90.410 POST-PANCREATECTOMY DIABETES (H): Primary | ICD-10-CM

## 2021-10-07 DIAGNOSIS — Z01.818 PRE-TRANSPLANT EVALUATION FOR KIDNEY TRANSPLANT: Primary | ICD-10-CM

## 2021-10-07 DIAGNOSIS — Z90.410 POST-PANCREATECTOMY DIABETES (H): ICD-10-CM

## 2021-10-07 DIAGNOSIS — E13.9 POST-PANCREATECTOMY DIABETES (H): ICD-10-CM

## 2021-10-07 DIAGNOSIS — K90.9 INTESTINAL MALABSORPTION, UNSPECIFIED: ICD-10-CM

## 2021-10-07 DIAGNOSIS — E89.1 POST-PANCREATECTOMY DIABETES (H): ICD-10-CM

## 2021-10-07 DIAGNOSIS — E13.9 POST-PANCREATECTOMY DIABETES (H): Primary | ICD-10-CM

## 2021-10-07 LAB
ALBUMIN SERPL-MCNC: 3.4 G/DL (ref 3.4–5)
ALP SERPL-CCNC: 150 U/L (ref 40–150)
ALT SERPL W P-5'-P-CCNC: 38 U/L (ref 0–50)
ANION GAP SERPL CALCULATED.3IONS-SCNC: 2 MMOL/L (ref 3–14)
AST SERPL W P-5'-P-CCNC: 25 U/L (ref 0–45)
BASOPHILS # BLD AUTO: 0.1 10E3/UL (ref 0–0.2)
BASOPHILS NFR BLD AUTO: 1 %
BILIRUB SERPL-MCNC: 0.4 MG/DL (ref 0.2–1.3)
BUN SERPL-MCNC: 19 MG/DL (ref 7–30)
CALCIUM SERPL-MCNC: 9.2 MG/DL (ref 8.5–10.1)
CHLORIDE BLD-SCNC: 111 MMOL/L (ref 94–109)
CHOLEST SERPL-MCNC: 188 MG/DL
CO2 SERPL-SCNC: 29 MMOL/L (ref 20–32)
CREAT SERPL-MCNC: 0.8 MG/DL (ref 0.52–1.04)
CREAT UR-MCNC: 35 MG/DL
EOSINOPHIL # BLD AUTO: 0.1 10E3/UL (ref 0–0.7)
EOSINOPHIL NFR BLD AUTO: 2 %
ERYTHROCYTE [DISTWIDTH] IN BLOOD BY AUTOMATED COUNT: 14.1 % (ref 10–15)
FASTING STATUS PATIENT QL REPORTED: NO
FASTING STATUS PATIENT QL REPORTED: NORMAL
FASTING STATUS PATIENT QL REPORTED: YES
FERRITIN SERPL-MCNC: 188 NG/ML (ref 8–252)
GFR SERPL CREATININE-BSD FRML MDRD: 82 ML/MIN/1.73M2
GLUCOSE BLD-MCNC: 135 MG/DL (ref 70–99)
GLUCOSE BLD-MCNC: 201 MG/DL (ref 70–99)
GLUCOSE BLD-MCNC: 282 MG/DL (ref 70–99)
HBA1C MFR BLD: 6.5 % (ref 0–5.6)
HCT VFR BLD AUTO: 40 % (ref 35–47)
HDLC SERPL-MCNC: 90 MG/DL
HGB BLD-MCNC: 12.4 G/DL (ref 11.7–15.7)
IMM GRANULOCYTES # BLD: 0 10E3/UL
IMM GRANULOCYTES NFR BLD: 0 %
IRON SATN MFR SERPL: 33 % (ref 15–46)
IRON SERPL-MCNC: 80 UG/DL (ref 35–180)
LDLC SERPL CALC-MCNC: 81 MG/DL
LYMPHOCYTES # BLD AUTO: 1.7 10E3/UL (ref 0.8–5.3)
LYMPHOCYTES NFR BLD AUTO: 28 %
MCH RBC QN AUTO: 29.8 PG (ref 26.5–33)
MCHC RBC AUTO-ENTMCNC: 31 G/DL (ref 31.5–36.5)
MCV RBC AUTO: 96 FL (ref 78–100)
MICROALBUMIN UR-MCNC: <5 MG/L
MICROALBUMIN/CREAT UR: NORMAL MG/G{CREAT}
MONOCYTES # BLD AUTO: 0.4 10E3/UL (ref 0–1.3)
MONOCYTES NFR BLD AUTO: 7 %
NEUTROPHILS # BLD AUTO: 3.8 10E3/UL (ref 1.6–8.3)
NEUTROPHILS NFR BLD AUTO: 62 %
NONHDLC SERPL-MCNC: 98 MG/DL
NRBC # BLD AUTO: 0 10E3/UL
NRBC BLD AUTO-RTO: 0 /100
PLATELET # BLD AUTO: 407 10E3/UL (ref 150–450)
POTASSIUM BLD-SCNC: 4.3 MMOL/L (ref 3.4–5.3)
PROT SERPL-MCNC: 7 G/DL (ref 6.8–8.8)
RBC # BLD AUTO: 4.16 10E6/UL (ref 3.8–5.2)
SODIUM SERPL-SCNC: 142 MMOL/L (ref 133–144)
TIBC SERPL-MCNC: 244 UG/DL (ref 240–430)
TRIGL SERPL-MCNC: 83 MG/DL
VIT B12 SERPL-MCNC: 304 PG/ML (ref 193–986)
WBC # BLD AUTO: 6.1 10E3/UL (ref 4–11)

## 2021-10-07 PROCEDURE — 84446 ASSAY OF VITAMIN E: CPT | Mod: 90 | Performed by: PATHOLOGY

## 2021-10-07 PROCEDURE — 82607 VITAMIN B-12: CPT | Performed by: PATHOLOGY

## 2021-10-07 PROCEDURE — 84134 ASSAY OF PREALBUMIN: CPT | Mod: 90 | Performed by: PATHOLOGY

## 2021-10-07 PROCEDURE — 85025 COMPLETE CBC W/AUTO DIFF WBC: CPT | Performed by: PATHOLOGY

## 2021-10-07 PROCEDURE — 84590 ASSAY OF VITAMIN A: CPT | Mod: 90 | Performed by: PATHOLOGY

## 2021-10-07 PROCEDURE — 80053 COMPREHEN METABOLIC PANEL: CPT | Mod: 90 | Performed by: PATHOLOGY

## 2021-10-07 PROCEDURE — 82542 COL CHROMOTOGRAPHY QUAL/QUAN: CPT | Mod: 90 | Performed by: PATHOLOGY

## 2021-10-07 PROCEDURE — 80061 LIPID PANEL: CPT | Mod: 90 | Performed by: PATHOLOGY

## 2021-10-07 PROCEDURE — 84630 ASSAY OF ZINC: CPT | Mod: 90 | Performed by: PATHOLOGY

## 2021-10-07 PROCEDURE — 82728 ASSAY OF FERRITIN: CPT | Mod: 90 | Performed by: PATHOLOGY

## 2021-10-07 PROCEDURE — 83550 IRON BINDING TEST: CPT | Mod: 90 | Performed by: PATHOLOGY

## 2021-10-07 PROCEDURE — 36415 COLL VENOUS BLD VENIPUNCTURE: CPT | Performed by: PATHOLOGY

## 2021-10-07 PROCEDURE — 82306 VITAMIN D 25 HYDROXY: CPT | Mod: 90 | Performed by: PATHOLOGY

## 2021-10-07 PROCEDURE — 83036 HEMOGLOBIN GLYCOSYLATED A1C: CPT | Performed by: PATHOLOGY

## 2021-10-07 PROCEDURE — 82043 UR ALBUMIN QUANTITATIVE: CPT | Performed by: PATHOLOGY

## 2021-10-07 PROCEDURE — 99215 OFFICE O/P EST HI 40 MIN: CPT | Performed by: PEDIATRICS

## 2021-10-07 PROCEDURE — 84681 ASSAY OF C-PEPTIDE: CPT | Mod: 90 | Performed by: PATHOLOGY

## 2021-10-07 RX ORDER — GLUCAGON INJECTION, SOLUTION 1 MG/.2ML
1 INJECTION, SOLUTION SUBCUTANEOUS
Qty: 15 ML | Refills: 5 | Status: SHIPPED | OUTPATIENT
Start: 2021-10-07

## 2021-10-07 RX ORDER — INSULIN ASPART INJECTION 100 [IU]/ML
.5-4 INJECTION, SOLUTION SUBCUTANEOUS
Qty: 15 ML | Refills: 5 | Status: SHIPPED | OUTPATIENT
Start: 2021-10-07 | End: 2022-10-25

## 2021-10-07 ASSESSMENT — PAIN SCALES - GENERAL: PAINLEVEL: NO PAIN (0)

## 2021-10-07 NOTE — PATIENT INSTRUCTIONS
1)  Switch to Fiasp (faster version of Novolog) at the same doses to see if this is helpful for those highs and lows.    2)  Let me know if you are still having excursions up to 250s routinely or dropping too low.    3)  Sent also Rx for glucagon pen (Gvoke)-- bring it with you when you are travelling in case of emergency.     4)  You will let me know when you actually get scheduled for surgery.  We may need to go up on the insulin a little bit right after surgery when your body is more stressed.    Feb 3 at 1:40 for follow up

## 2021-10-07 NOTE — LETTER
10/7/2021         RE: Lynn Thompson  [address] [address] [facility] [id number]        Dear Colleague,    Thank you for referring your patient, Lynn Thompson, to the [facility]. Please see a copy of my visit note below.    [facility]  Islet Autotransplant, Diabetes Follow Up    Problem List:  Patient Active Problem List   Diagnosis     Iron deficiency anemia secondary to inadequate dietary iron intake     Gastric bypass status for obesity     Health Care Home     Chronic pain syndrome     Exocrine pancreatic insufficiency     Asplenia     BLU (obstructive sleep apnea)     S/P exploratory laparotomy     Dysthymia     Anxiety     Thyroid nodule     Acquired hypothyroidism     Post-pancreatectomy diabetes (H)     Other iron deficiency anemia       HPI:  Lynn is a 56 year old female here for follow up o total pancreatectomy, islet cell autotransplant, splenectomy, liver biopsy (needle core), choledochoduodenostomy, feeding jejunostomy performed on 5/10/2013.  At the time of the procedure, the patient received 178,100 IEQ, or 3,209 IEQ/kg body weight. She has had two subsequent exploratory laparatomy for small bowel obstruction. Other notable endocrine history includes a complex cystic nodule in mid right thyroid lobe with 1 cm maximum diameter (saw Dr Adorno in 11/2016) which needs annual follow up U/S in Nov 2017, and mild hypothyroidism followed by PCP.  She had an episode of cholangitis and was hospitalized for 4 days 8/24/17 (fevers, RUQ pain, + Ecoli blood cx).    She was on NO insulin at her 1 year, 2 year, 3 year, 4 year transplant anniversaries and restart insulin just after 4 years post-tx, insulin restart date of  6/6/2017.  She is continuing to wean narcotics, on a suboxone wean, now on a 1/8 patch (Suboxone 1- 0.5 mg film) daily, with no other narcotics.     At today's visit, overall Lynn is doing well overall.      1)  Diabetes:   Lynn continues to have partial islet graft function.  We reviewed her Dexcom today which snowed a notable spike in BG after dinnertime.  She said she tried adjusting up on the dose but then she goes low after an hour or so.  She is on Novolog right before meal time and has rapid gastric emptying. She uses the pen for half unit doses.     Lantus 5 units per day  Novolog 0.5 units per 10g, and correction scale of 0.5 u per 100 >150 (3 injections per day with meals)--     Mixed meal shows islet graft fx.    Upcoming complicating factors-- surgery for rectal prolapse, and hysterectomy       Recent A1c:   Lab Results   Component Value Date    A1C 6.5 10/07/2021    A1C 7.0 09/03/2021    A1C 6.8 05/10/2021    A1C 6.9 02/16/2021    A1C 6.7 08/20/2020    A1C 7.1 06/05/2020    A1C 6.9 02/06/2020         Hypoglycemia history:   Recent history as above.  The patient has had NO episodes of severe hypoglycemia (seizure, coma, or neuroglycopenic symptoms severe enough to require assistance from another person).  Blood sugars were reviewed from the patient records and/or the meter download.      Meter checks 1 per day, mean 127 mg/dL, SD 36 mg/dL     DEXCOM G6  Mon Sep 27, 2021 - Thu Oct 7, 2021 (10.0 days)??  ????Glucose Statistics ????  Avg Glucose mg/dL  166 mg/dL, SD 83 mg/dL  Glucose Exposure Very Low < 54 mg/dL  0.0%   Low < 70 mg/dL  0.7%   In Target Range 70 - 180 mg/dL  67.6%   High > 180 mg/dL  31.7%   Very High > 250 mg/dL  16.8%       Patient is good candidate for CGM therapy.  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   --   Require frequent adjustments of the insulin treatment regimen, based on therapeutic CGM test results      2)  New issues:  Weight loss has been a problem but she has actually stabilize since summer.  Discussed dietitian consult. .     Review of systems:  Review Of Systems  Skin: negative  Eyes: negative  Ears/Nose/Throat: negative  Respiratory: No shortness of breath, dyspnea on exertion, cough, or hemoptysis  Cardiovascular: negative  Gastrointestinal: as above- no recent issues  Genitourinary: negative  Musculoskeletal: negative  Neurologic: negative  Psychiatric: anxiety/depression  Hematologic/Lymphatic/Immunologic: negative  Endocrine: as above    Past Medical and Surgical History:  Past Medical History:   Diagnosis Date     Benign paroxysmal positional vertigo     occ.      Calculus of kidney 05/2005    x1 on L side passed, several stones.  Has been tested for oxalate.     Chronic abdominal pain 2013     Chronic pain      Chronic pancreatitis 2013     Depression     also occ panic spells     Depressive disorder      Diabetes (H) 5/10/2013    Total Pancreatomy with Auto Islets Transplant     Dyspepsia 1999    H. pylori   treated     Headaches     still periodic HA's ;  often 5X/week     Hypertension 2016    Stress related     Iron deficiency anemia secondary to inadequate dietary iron intake 2003    relates to gastric bypass     Post-pancreatectomy diabetes melltius 2013     Sleep apnea     uses splint     Spasm of sphincter of Oddi     surgical + endoscopic stenting of pancreatic duct @ [facility] 06     Thyroid nodule 2016     Vaccination not carried out      Past Surgical History:   Procedure Laterality Date     ABDOMINOPLASTY  2002    Tummy tuck     APPENDECTOMY  1990     BUNIONECTOMY Right 1998     CBD Stent placement  2002    CBD stent; Dr. Presley      SECTION       CHOLECYSTECTOMY       COLONOSCOPY   &     colonoscopy     COLONOSCOPY        COLONOSCOPY N/A 3/31/2021    Procedure: COLONOSCOPY INCOMPLETE Aborted due to incomplete prep  will need to take additional prep and return tomorrow 4/1/21;  Surgeon: Ihsan Saenz MD;  Location: [facility]     COMBINED CYSTOSCOPY, RETROGRADES, URETEROSCOPY, LASER HOLMIUM LITHOTRIPSY URETER(S), INSERT STENT Right 03/23/2015    Procedure: COMBINED CYSTOSCOPY, RETROGRADES, URETEROSCOPY, LASER HOLMIUM LITHOTRIPSY URETER(S), INSERT STENT;  Surgeon: Kennedi Aldana MD;  Location: [facility]     COMBINED CYSTOSCOPY, RETROGRADES, URETEROSCOPY, LASER HOLMIUM LITHOTRIPSY URETER(S), INSERT STENT Right 04/20/2015    Procedure: COMBINED CYSTOSCOPY, RETROGRADES, URETEROSCOPY, LASER HOLMIUM LITHOTRIPSY URETER(S), INSERT STENT;  Surgeon: Kennedi Aldana MD;  Location: [facility]     COSMETIC SURGERY  6/24/2002    Tummy tuck     CYSTECTOMY OVARIAN BENIGN Right      CYSTOSCOPY, RETROGRADES, INSERT STENT URETER(S), COMBINED  10/02/2012    Procedure: COMBINED CYSTOSCOPY, RETROGRADES, INSERT STENT URETER(S);  COMBINED CYSTOSCOPY,  , INSERT LEFT STENT URETER;  Surgeon: Johny Baez MD;  Location: [facility]     ESOPHAGOSCOPY, GASTROSCOPY, DUODENOSCOPY (EGD), COMBINED N/A 01/24/2018    Procedure: COMBINED ESOPHAGOSCOPY, GASTROSCOPY, DUODENOSCOPY (EGD);  ESOPHAGOSCOPY, GASTROSCOPY, DUODENOSCOPY (EGD)    ;  Surgeon: Tamir Rodgers MD;  Location: [facility]     EXTRACORPOREAL SHOCK WAVE LITHOTRIPSY (ESWL)  10/16/2012    Procedure: EXTRACORPOREAL SHOCK WAVE LITHOTRIPSY (ESWL);  left EXTRACORPOREAL SHOCK WAVE LITHOTRIPSY (ESWL) ;  Surgeon: Johny Baez MD;  Location: [facility]     Gastric bypass NOS       HERNIA REPAIR  02/2015     IRRIGATION AND DEBRIDEMENT HAND, COMBINED Left 10/30/2020    Procedure: Left hand sharp excisional debridement of skin, subcutaneous tissue and fat with a scalpel, 2 x 1 x 1 cm.;  Surgeon: Demian Renteria MD;  Location: [facility]     LAP, LYSIS OF ADHESIONS       LAPAROSCOPIC LYSIS ADHESIONS N/A  2015    Procedure: LAPAROSCOPIC LYSIS ADHESIONS;  Surgeon: Aaron Early MD;  [facility]     LAPAROSCOPIC LYSIS ADHESIONS N/A 2015    Procedure: LAPAROSCOPIC LYSIS ADHESIONS;  Surgeon: Aaron Early MD;  [facility]     PANCREATECTOMY, TRANSPLANT AUTO ISLET CELL, COMBINED  05/10/2013    Procedure: COMBINED PANCREATECTOMY, TRANSPLANT AUTO ISLET CELL;  Pancreatectomy, Auto Islet Cell Transplant   hernia repair, jejunostomy tube and liver biopsies with Anesthesia General with block;  Surgeon: Aaron Early MD;  [facility]: [facility]     Partial ileum resection  1992     REPAIR PTOSIS BROW BILATERAL Bilateral 2020    Procedure: BILATERAL BROW PTOSIS REPAIR;  Surgeon: Denise Alberts MD;  Location:  OR     Surgery for SBO       TONSILLECTOMY, ADENOIDECTOMY, COMBINED  1997     TRANSPLANT  5/10/13    Pancreatic Auto-Islet Transplant       Family History:  New changes since last visit:  none  Family History   Problem Relation Age of Onset     Family History Negative Mother      Respiratory Father         COPD;  at 69     Genitourinary Problems Father         kidney stones     Substance Abuse Father      Depression Father      Asthma Father      Heart Disease Paternal Grandfather         M.I.     Coronary Artery Disease Paternal Grandfather      Hyperlipidemia Paternal Grandfather      Genitourinary Problems Brother         multiple brothers with kidney stones     Gastrointestinal Disease Maternal Grandmother         undiagnosed 'gut' issues     Coronary Artery Disease Maternal Grandfather      Hyperlipidemia Maternal Grandfather      Cerebrovascular Disease Paternal Grandmother         At the age of 103     Anxiety Disorder Paternal Grandmother      Osteoporosis Paternal Grandmother      Anxiety Disorder Son      Anxiety Disorder Daughter      Asthma Daughter      Colon Cancer No family hx of        Social History:  Social History     Social History Narrative     Not on file      Does not work currently.  Mom to many foster dogs     Physical Exam:  Vitals: BP (!) 155/78   Pulse 65   Wt 52.1 kg (114 lb 13.8 oz)   LMP 12/19/2013   SpO2 98%   BMI 20.03 kg/m    BMI= Body mass index is 20.03 kg/m .  General:  Appearance is normal, no acute distress  HEENT:  NC/AT, sclera appear white  Neck:  No obvious thyromegaly  CV/Lungs:  Non distressed breathing  Skin:  No apparent rashes.   Neuro:  Normal mental status  Psych:  Normal affect    Results  Mixed Meal Test:  C Peptide   Date Value Ref Range Status   10/07/2021 1.4 0.9 - 6.9 ng/mL Final   10/07/2021 1.2 0.9 - 6.9 ng/mL Final   09/03/2021 0.3 (L) 0.9 - 6.9 ng/mL Final   08/20/2020 3.4 0.9 - 6.9 ng/mL Final   08/20/2020 1.5 0.9 - 6.9 ng/mL Final   08/20/2020 0.5 (L) 0.9 - 6.9 ng/mL Final   05/16/2019 1.8 0.9 - 6.9 ng/mL Final   05/16/2019 1.5 0.9 - 6.9 ng/mL Final     Glucose   Date Value Ref Range Status   10/07/2021 201 (H) 70 - 99 mg/dL Final   10/07/2021 282 (H) 70 - 99 mg/dL Final   10/07/2021 135 (H) 70 - 99 mg/dL Final   09/03/2021 116 (H) 70 - 99 mg/dL Final   08/17/2021 162 (H) 70 - 99 mg/dL Final   05/10/2021 169 (H) 70 - 99 mg/dL Final   03/01/2021 141 (H) 70 - 99 mg/dL Final   02/28/2021 120 (H) 70 - 99 mg/dL Final   02/16/2021 106 (H) 70 - 99 mg/dL Final   10/30/2020 126 (H) 70 - 99 mg/dL Final       Hemoglobin A1c  Lab Results   Component Value Date    A1C 6.5 10/07/2021    A1C 7.0 09/03/2021    A1C 6.8 05/10/2021    A1C 6.9 02/16/2021    A1C 6.7 08/20/2020    A1C 7.1 06/05/2020    A1C 6.9 02/06/2020       Liver enzymes  Lab Results   Component Value Date    AST 25 10/07/2021    AST 24 05/10/2021     Lab Results   Component Value Date    ALT 38 10/07/2021    ALT 35 05/10/2021     Lab Results   Component Value Date    BILICONJ 0.0 05/12/2014      Lab Results   Component Value Date    BILITOTAL 0.4 10/07/2021    BILITOTAL 0.3 05/10/2021     Lab Results   Component Value Date    ALBUMIN 3.4 10/07/2021    ALBUMIN 3.4 05/10/2021      Lab Results   Component Value Date    PROTTOTAL 7.0 10/07/2021    PROTTOTAL 6.9 05/10/2021      Lab Results   Component Value Date    ALKPHOS 150 10/07/2021    ALKPHOS 132 05/10/2021       Creatinine:  Creatinine   Date Value Ref Range Status   10/07/2021 0.80 0.52 - 1.04 mg/dL Final   05/10/2021 0.81 0.52 - 1.04 mg/dL Final   ]    Complete Blood Count:  CBC RESULTS:   Recent Labs   Lab Test 10/07/21  0928   WBC 6.1   RBC 4.16   HGB 12.4   HCT 40.0   MCV 96   MCH 29.8   MCHC 31.0*   RDW 14.1          Cholesterol  Lab Results   Component Value Date    CHOL 188 10/07/2021    CHOL 192 09/17/2020     Lab Results   Component Value Date    HDL 90 10/07/2021    HDL 91 09/17/2020     Lab Results   Component Value Date    LDL 81 10/07/2021    LDL 84 09/17/2020     Lab Results   Component Value Date    TRIG 83 10/07/2021    TRIG 83 09/17/2020     Lab Results   Component Value Date    CHOLHDLRATIO 1.9 11/20/2014       Assessment:  1.  Post pancreatectomy diabetes mellitus, s/p total pancreatectomy and islet autotransplant.    Lynn is a 56 year old with history of chronic pancreatitis who is s/p total pancreatectomy and islet autotransplant.  She was insulin independent until 6/6/2017 (just after 4 years post-tx) and now has partial islet function.    My only concern today is significant highs after dinner.  She can't adjust coverage because then she drops too low, and this probably reflects her rapid gastric emptying.  I think Fiasp cartridges may be a better fit for her.      Plan:  1.  Changes to current diabetes regimen:  Patient Instructions   1)  Switch to Fiasp (faster version of Novolog) at the same doses to see if this is helpful for those highs and lows.    2)  Let me know if you are still having excursions up to 250s routinely or dropping too low.    3)  Sent also Rx for glucagon pen (Gvoke)-- bring it with you when you are travelling in case of emergency.     4)  You will let me know when you actually  get scheduled for surgery.  We may need to go up on the insulin a little bit right after surgery when your body is more stressed.    Feb 3 at 1:40 for follow up      2.  Frequency of blood sugar checks:  premeal and bedtime, and as needed for hypoglycemia (6 strip per day).    3.  Continue routine follow up for autoislet transplant patients:  Mixed meal test (6 mL/kg BoostHP to max of 360 mL) at 3 months, 6 months, and once a year post transplant.  Hemoglobin A1c levels at these time points and quarterly.    4.  Other issues addressed today:  As above    Follow up:  As above        Contact me for questions at [phone] or [phone].  Emergency number to reach pediatric endocrinology after hours is [phone].    Dr. Adriana Robles MD  [facility] [place]  Director of Research, Islet Auto-transplant Program  Phone:  [phone]  Electronically signed: October 8, 2021 at 5:17 PM          Review of results as above  Prescription drug management  40 minutes spent on the date of the encounter doing chart review, history and exam, documentation and further activities per the note        Again, thank you for allowing me to participate in the care of your patient.        Sincerely,        Adriana Robles MD

## 2021-10-08 LAB
C PEPTIDE SERPL-MCNC: 1.2 NG/ML (ref 0.9–6.9)
C PEPTIDE SERPL-MCNC: 1.4 NG/ML (ref 0.9–6.9)

## 2021-10-08 NOTE — PROGRESS NOTES
[facility]  Islet Autotransplant, Diabetes Follow Up    Problem List:  Patient Active Problem List   Diagnosis     Iron deficiency anemia secondary to inadequate dietary iron intake     Gastric bypass status for obesity     Health Care Home     Chronic pain syndrome     Exocrine pancreatic insufficiency     Asplenia     BLU (obstructive sleep apnea)     S/P exploratory laparotomy     Dysthymia     Anxiety     Thyroid nodule     Acquired hypothyroidism     Post-pancreatectomy diabetes (H)     Other iron deficiency anemia       HPI:  Lynn is a 56 year old female here for follow up o total pancreatectomy, islet cell autotransplant, splenectomy, liver biopsy (needle core), choledochoduodenostomy, feeding jejunostomy performed on 5/10/2013.  At the time of the procedure, the patient received 178,100 IEQ, or 3,209 IEQ/kg body weight. She has had two subsequent exploratory laparatomy for small bowel obstruction. Other notable endocrine history includes a complex cystic nodule in mid right thyroid lobe with 1 cm maximum diameter (saw Dr Adorno in 11/2016) which needs annual follow up U/S in Nov 2017, and mild hypothyroidism followed by PCP.  She had an episode of cholangitis and was hospitalized for 4 days 8/24/17 (fevers, RUQ pain, + Ecoli blood cx).    She was on NO insulin at her 1 year, 2 year, 3 year, 4 year transplant anniversaries and restart insulin just after 4 years post-tx, insulin restart date of  6/6/2017.  She is continuing to wean narcotics, on a suboxone wean, now on a 1/8 patch (Suboxone 1- 0.5 mg film) daily, with no other narcotics.     At today's visit, overall Lynn is doing well overall.      1)  Diabetes:  Lynn continues to have partial islet graft function.  We reviewed her Dexcom today which snowed a notable spike in BG after dinnertime.  She said she tried adjusting up on the dose but then she goes low after an hour or so.  She is on Novolog right before  meal time and has rapid gastric emptying. She uses the pen for half unit doses.     Lantus 5 units per day  Novolog 0.5 units per 10g, and correction scale of 0.5 u per 100 >150 (3 injections per day with meals)--     Mixed meal shows islet graft fx.    Upcoming complicating factors-- surgery for rectal prolapse, and hysterectomy       Recent A1c:   Lab Results   Component Value Date    A1C 6.5 10/07/2021    A1C 7.0 09/03/2021    A1C 6.8 05/10/2021    A1C 6.9 02/16/2021    A1C 6.7 08/20/2020    A1C 7.1 06/05/2020    A1C 6.9 02/06/2020         Hypoglycemia history:   Recent history as above.  The patient has had NO episodes of severe hypoglycemia (seizure, coma, or neuroglycopenic symptoms severe enough to require assistance from another person).  Blood sugars were reviewed from the patient records and/or the meter download.      Meter checks 1 per day, mean 127 mg/dL, SD 36 mg/dL     DEXCOM G6  Mon Sep 27, 2021 - Thu Oct 7, 2021 (10.0 days)??  ????Glucose Statistics ????  Avg Glucose mg/dL  166 mg/dL, SD 83 mg/dL  Glucose Exposure Very Low < 54 mg/dL  0.0%   Low < 70 mg/dL  0.7%   In Target Range 70 - 180 mg/dL  67.6%   High > 180 mg/dL  31.7%   Very High > 250 mg/dL  16.8%       Patient is good candidate for CGM therapy.  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   --  Require frequent adjustments of the insulin treatment regimen, based on therapeutic CGM test results      2)  New issues:  Weight loss has been a problem but she has actually stabilize since summer.  Discussed dietitian consult. .     Review of  systems:  Review Of Systems  Skin: negative  Eyes: negative  Ears/Nose/Throat: negative  Respiratory: No shortness of breath, dyspnea on exertion, cough, or hemoptysis  Cardiovascular: negative  Gastrointestinal: as above- no recent issues  Genitourinary: negative  Musculoskeletal: negative  Neurologic: negative  Psychiatric: anxiety/depression  Hematologic/Lymphatic/Immunologic: negative  Endocrine: as above    Past Medical and Surgical History:  Past Medical History:   Diagnosis Date     Benign paroxysmal positional vertigo     occ.      Calculus of kidney 05/2005    x1 on L side passed, several stones.  Has been tested for oxalate.     Chronic abdominal pain 2013     Chronic pain      Chronic pancreatitis 2013     Depression     also occ panic spells     Depressive disorder      Diabetes (H) 5/10/2013    Total Pancreatomy with Auto Islets Transplant     Dyspepsia 1999    H. pylori   treated     Headaches     still periodic HA's ;  often 5X/week     Hypertension 2016    Stress related     Iron deficiency anemia secondary to inadequate dietary iron intake 2003    relates to gastric bypass     Post-pancreatectomy diabetes melltius 2013     Sleep apnea     uses splint     Spasm of sphincter of Oddi     surgical + [place] stent[place] of pancreatic [place] [facility] [place]     Thyroid nodule 2016     Vaccination not carried out      Past Surgical History:   Procedure Laterality Date     ABDOMINOPLASTY  2002    Tummy tuck     APPENDECTOMY  1990     BUNIONECTOMY Right 1998     CBD Stent placement  2002    CBD stent; Dr. Presley      SECTION       CHOLECYSTECTOMY       COLONOSCOPY   &     colonoscopy     COLONOSCOPY       COLONOSCOPY N/A 3/31/2021    Procedure: COLONOSCOPY INCOMPLETE Aborted due to incomplete prep  will need to take additional prep and return tomorrow 21;  Surgeon: Ihsan Saenz MD;  Location:  GI     COMBINED CYSTOSCOPY, RETROGRADES,  URETEROSCOPY, LASER HOLMIUM LITHOTRIPSY URETER(S), INSERT STENT Right 03/23/2015    Procedure: COMBINED CYSTOSCOPY, RETROGRADES, URETEROSCOPY, LASER HOLMIUM LITHOTRIPSY URETER(S), INSERT STENT;  Surgeon: Kennedi Aldana MD;  Location: [facility] OR     COMBINED CYSTOSCOPY, RETROGRADES, URETEROSCOPY, LASER HOLMIUM LITHOTRIPSY URETER(S), INSERT STENT Right 04/20/2015    Procedure: COMBINED CYSTOSCOPY, RETROGRADES, URETEROSCOPY, LASER HOLMIUM LITHOTRIPSY URETER(S), INSERT STENT;  Surgeon: Kennedi Aldana MD;  Location: [facility] OR     COSMETIC SURGERY  6/24/2002    Tummy tuck     CYSTECTOMY OVARIAN BENIGN Right      CYSTOSCOPY, RETROGRADES, INSERT STENT URETER(S), COMBINED  10/02/2012    Procedure: COMBINED CYSTOSCOPY, RETROGRADES, INSERT STENT URETER(S);  COMBINED CYSTOSCOPY,  , INSERT LEFT STENT URETER;  Surgeon: Johny Baez MD;  Location: [facility] OR     ESOPHAGOSCOPY, GASTROSCOPY, DUODENOSCOPY (EGD), COMBINED N/A 01/24/2018    Procedure: COMBINED ESOPHAGOSCOPY, GASTROSCOPY, DUODENOSCOPY (EGD);  ESOPHAGOSCOPY, GASTROSCOPY, DUODENOSCOPY (EGD)    ;  Surgeon: Tamir Rodgers MD;  Location:  GI     EXTRACORPOREAL SHOCK WAVE LITHOTRIPSY (ESWL)  10/16/2012    Procedure: EXTRACORPOREAL SHOCK WAVE LITHOTRIPSY (ESWL);  left EXTRACORPOREAL SHOCK WAVE LITHOTRIPSY (ESWL) ;  Surgeon: Johny Baez MD;  Location: [facility] OR     Gastric bypass NOS       HERNIA REPAIR  02/2015     IRRIGATION AND DEBRIDEMENT HAND, COMBINED Left 10/30/2020    Procedure: Left hand sharp excisional debridement of skin, subcutaneous tissue and fat with a scalpel, 2 x 1 x 1 cm.;  Surgeon: Demian Renteria MD;  Location: [facility] OR     LAP, LYSIS OF ADHESIONS       LAPAROSCOPIC LYSIS ADHESIONS N/A 02/20/2015    Procedure: LAPAROSCOPIC LYSIS ADHESIONS;  Surgeon: Aaron Early MD;  [facility]     LAPAROSCOPIC LYSIS ADHESIONS N/A 12/29/2015    Procedure: LAPAROSCOPIC LYSIS ADHESIONS;  Surgeon: Aaron Early MD;  [facility]      PANCREATECTOMY, TRANSPLANT AUTO ISLET CELL, COMBINED  05/10/2013    Procedure: COMBINED PANCREATECTOMY, TRANSPLANT AUTO ISLET CELL;  Pancreatectomy, Auto Islet Cell Transplant   hernia repair, jejunostomy tube and liver biopsies with Anesthesia General with block;  Surgeon: Aaron Early MD;  Location: [facility]     Partial ileum resection       REPAIR PTOSIS BROW BILATERAL Bilateral 2020    Procedure: BILATERAL BROW PTOSIS REPAIR;  Surgeon: Denise Alberts MD;  Location:  OR     Surgery for SBO       TONSILLECTOMY, ADENOIDECTOMY, COMBINED  1997     TRANSPLANT  5/10/13    Pancreatic Auto-Islet Transplant       Family History:  New changes since last visit:  none  Family History   Problem Relation Age of Onset     Family History Negative Mother      Respiratory Father         COPD;  at 69     Genitourinary Problems Father         kidney stones     Substance Abuse Father      Depression Father      Asthma Father      Heart Disease Paternal Grandfather         M.I.     Coronary Artery Disease Paternal Grandfather      Hyperlipidemia Paternal Grandfather      Genitourinary Problems Brother         multiple brothers with kidney stones     Gastrointestinal Disease Maternal Grandmother         undiagnosed 'gut' issues     Coronary Artery Disease Maternal Grandfather      Hyperlipidemia Maternal Grandfather      Cerebrovascular Disease Paternal Grandmother         At the age of 103     Anxiety Disorder Paternal Grandmother      Osteoporosis Paternal Grandmother      Anxiety Disorder Son      Anxiety Disorder Daughter      Asthma Daughter      Colon Cancer No family hx of        Social History:  Social History     Social History Narrative     Not on file     Does not work currently.  Mom to many foster dogs     Physical Exam:  Vitals: BP (!) 155/78   Pulse 65   Wt 52.1 kg (114 lb 13.8 oz)   LMP 2013   SpO2 98%   BMI 20.03 kg/m    BMI= Body mass index is 20.03 kg/m .  General:  Appearance  is normal, no acute distress  HEENT:  NC/AT, sclera appear white  Neck:  No obvious thyromegaly  CV/Lungs:  Non distressed breathing  Skin:  No apparent rashes.   Neuro:  Normal mental status  Psych:  Normal affect    Results  Mixed Meal Test:  C Peptide   Date Value Ref Range Status   10/07/2021 1.4 0.9 - 6.9 ng/mL Final   10/07/2021 1.2 0.9 - 6.9 ng/mL Final   09/03/2021 0.3 (L) 0.9 - 6.9 ng/mL Final   08/20/2020 3.4 0.9 - 6.9 ng/mL Final   08/20/2020 1.5 0.9 - 6.9 ng/mL Final   08/20/2020 0.5 (L) 0.9 - 6.9 ng/mL Final   05/16/2019 1.8 0.9 - 6.9 ng/mL Final   05/16/2019 1.5 0.9 - 6.9 ng/mL Final     Glucose   Date Value Ref Range Status   10/07/2021 201 (H) 70 - 99 mg/dL Final   10/07/2021 282 (H) 70 - 99 mg/dL Final   10/07/2021 135 (H) 70 - 99 mg/dL Final   09/03/2021 116 (H) 70 - 99 mg/dL Final   08/17/2021 162 (H) 70 - 99 mg/dL Final   05/10/2021 169 (H) 70 - 99 mg/dL Final   03/01/2021 141 (H) 70 - 99 mg/dL Final   02/28/2021 120 (H) 70 - 99 mg/dL Final   02/16/2021 106 (H) 70 - 99 mg/dL Final   10/30/2020 126 (H) 70 - 99 mg/dL Final       Hemoglobin A1c  Lab Results   Component Value Date    A1C 6.5 10/07/2021    A1C 7.0 09/03/2021    A1C 6.8 05/10/2021    A1C 6.9 02/16/2021    A1C 6.7 08/20/2020    A1C 7.1 06/05/2020    A1C 6.9 02/06/2020       Liver enzymes  Lab Results   Component Value Date    AST 25 10/07/2021    AST 24 05/10/2021     Lab Results   Component Value Date    ALT 38 10/07/2021    ALT 35 05/10/2021     Lab Results   Component Value Date    BILICONJ 0.0 05/12/2014      Lab Results   Component Value Date    BILITOTAL 0.4 10/07/2021    BILITOTAL 0.3 05/10/2021     Lab Results   Component Value Date    ALBUMIN 3.4 10/07/2021    ALBUMIN 3.4 05/10/2021     Lab Results   Component Value Date    PROTTOTAL 7.0 10/07/2021    PROTTOTAL 6.9 05/10/2021      Lab Results   Component Value Date    ALKPHOS 150 10/07/2021    ALKPHOS 132 05/10/2021       Creatinine:  Creatinine   Date Value Ref Range Status    10/07/2021 0.80 0.52 - 1.04 mg/dL Final   05/10/2021 0.81 0.52 - 1.04 mg/dL Final   ]    Complete Blood Count:  CBC RESULTS:   Recent Labs   Lab Test 10/07/21  0928   WBC 6.1   RBC 4.16   HGB 12.4   HCT 40.0   MCV 96   MCH 29.8   MCHC 31.0*   RDW 14.1          Cholesterol  Lab Results   Component Value Date    CHOL 188 10/07/2021    CHOL 192 09/17/2020     Lab Results   Component Value Date    HDL 90 10/07/2021    HDL 91 09/17/2020     Lab Results   Component Value Date    LDL 81 10/07/2021    LDL 84 09/17/2020     Lab Results   Component Value Date    TRIG 83 10/07/2021    TRIG 83 09/17/2020     Lab Results   Component Value Date    CHOLHDLRATIO 1.9 11/20/2014       Assessment:  1.  Post pancreatectomy diabetes mellitus, s/p total pancreatectomy and islet autotransplant.    Lynn is a 56 year old with history of chronic pancreatitis who is s/p total pancreatectomy and islet autotransplant.  She was insulin independent until 6/6/2017 (just after 4 years post-tx) and now has partial islet function.    My only concern today is significant highs after dinner.  She can't adjust coverage because then she drops too low, and this probably reflects her rapid gastric emptying.  I think Fiasp cartridges may be a better fit for her.      Plan:  1.  Changes to current diabetes regimen:  Patient Instructions   1)  Switch to Fiasp (faster version of Novolog) at the same doses to see if this is helpful for those highs and lows.    2)  Let me know if you are still having excursions up to 250s routinely or dropping too low.    3)  Sent also Rx for glucagon pen (Gvoke)-- bring it with you when you are travelling in case of emergency.     4)  You will let me know when you actually get scheduled for surgery.  We may need to go up on the insulin a little bit right after surgery when your body is more stressed.    Feb 3 at 1:40 for follow up      2.  Frequency of blood sugar checks:  premeal and bedtime, and as needed for  hypoglycemia (6 strip per day).    3.  Continue routine follow up for autoislet transplant patients:  Mixed meal test (6 mL/kg BoostHP to max of 360 mL) at 3 months, 6 months, and once a year post transplant.  Hemoglobin A1c levels at these time points and quarterly.    4.  Other issues addressed today:  As above    Follow up:  As above        Contact me for questions at [phone] or [phone].  Emergency number to reach pediatric endocrinology after hours is [phone].    Dr. Adriana Robles MD  [facility] [place]  Director of Research, Islet Auto-transplant Program  Phone:  [phone]  Electronically signed: October 8, 2021 at 5:17 PM          Review of results as above  Prescription drug management  40 minutes spent on the date of the encounter doing chart review, history and exam, documentation and further activities per the note

## 2021-10-10 LAB
A-TOCOPHEROL VIT E SERPL-MCNC: 9.8 MG/L
ANNOTATION COMMENT IMP: NORMAL
BETA+GAMMA TOCOPHEROL SERPL-MCNC: 1 MG/L
RETINYL PALMITATE SERPL-MCNC: <0.02 MG/L
VIT A SERPL-MCNC: 0.31 MG/L
ZINC SERPL-MCNC: 78.6 UG/DL

## 2021-10-11 LAB
DEPRECATED CALCIDIOL+CALCIFEROL SERPL-MC: <52 UG/L (ref 20–75)
VITAMIN D2 SERPL-MCNC: <5 UG/L
VITAMIN D3 SERPL-MCNC: 47 UG/L

## 2021-10-13 LAB
A-LINOLENATE SERPL-SCNC: 66 NMOL/ML (ref 50–130)
AA SERPL-SCNC: 1814 NMOL/ML (ref 520–1490)
ARACHIDATE SERPL-SCNC: 37 NMOL/ML (ref 50–90)
CLINICAL BIOCHEMIST REVIEW: ABNORMAL
DHA SERPL-SCNC: 123 NMOL/ML (ref 30–250)
DOCOSAPENTAENATE W6 SERPL-SCNC: 56 NMOL/ML (ref 10–70)
DOCOSATETRAENOATE SERPL-SCNC: 56 NMOL/ML (ref 10–80)
DOCOSENOATE SERPL-SCNC: 7 NMOL/ML (ref 4–13)
DPA SERPL-SCNC: 120 NMOL/ML (ref 20–210)
EPA SERPL-SCNC: 123 NMOL/ML (ref 14–100)
FA SERPL-SCNC: 12.2 MMOL/L (ref 7.3–16.8)
G-LINOLENATE SERPL-SCNC: 80 NMOL/ML (ref 16–150)
HEXADECENOATE SERPL-SCNC: 58 NMOL/ML (ref 25–105)
HOMO-G LINOLENATE SERPL-SCNC: 198 NMOL/ML (ref 50–250)
LAURATE SERPL-SCNC: 20 NMOL/ML (ref 6–90)
LINOLEATE SERPL-SCNC: 3135 NMOL/ML (ref 2270–3850)
MEAD ACID SERPL-SCNC: 49 NMOL/ML (ref 7–30)
MONOUNSAT FA SERPL-SCNC: 2.8 MMOL/L (ref 1.3–5.8)
MYRISTATE SERPL-SCNC: 181 NMOL/ML (ref 30–450)
NERVONATE SERPL-SCNC: 128 NMOL/ML (ref 60–100)
OCTADECANOATE SERPL-SCNC: 906 NMOL/ML (ref 590–1170)
OLEATE SERPL-SCNC: 1968 NMOL/ML (ref 650–3500)
PALMITATE SERPL-SCNC: 2378 NMOL/ML (ref 1480–3730)
PALMITOLEATE SERPL-SCNC: 365 NMOL/ML (ref 110–1130)
POLYUNSAT FA SERPL-SCNC: 5.8 MMOL/L (ref 3.2–5.8)
SAT FA SERPL-SCNC: 3.6 MMOL/L (ref 2.5–5.5)
TRIENOATE/AA SERPL-SRTO: 0.03 {RATIO} (ref 0.01–0.04)
VACCENATE SERPL-SCNC: 204 NMOL/ML (ref 280–740)
W3 FA SERPL-SCNC: 0.4 MMOL/L (ref 0.2–0.5)
W6 FA SERPL-SCNC: 5.3 MMOL/L (ref 3–5.4)

## 2021-10-18 LAB — PREALB SERPL IA-MCNC: 16 MG/DL (ref 15–45)

## 2021-10-26 ENCOUNTER — PRE VISIT (OUTPATIENT)
Dept: UROLOGY | Facility: CLINIC | Age: 56
End: 2021-10-26

## 2021-10-26 NOTE — TELEPHONE ENCOUNTER
Reason for visit: rectocele consult     Relevant information: history of kidney stones    Records/imaging/labs/orders: all records available    Pt called: no need for a call

## 2021-11-01 ENCOUNTER — IMMUNIZATION (OUTPATIENT)
Dept: NURSING | Facility: CLINIC | Age: 56
End: 2021-11-01
Payer: COMMERCIAL

## 2021-11-01 PROCEDURE — 91300 PR COVID VAC PFIZER DIL RECON 30 MCG/0.3 ML IM: CPT

## 2021-11-01 PROCEDURE — 0004A PR COVID VAC PFIZER DIL RECON 30 MCG/0.3 ML IM: CPT

## 2021-11-03 ENCOUNTER — VIRTUAL VISIT (OUTPATIENT)
Dept: UROLOGY | Facility: CLINIC | Age: 56
End: 2021-11-03
Attending: COLON & RECTAL SURGERY
Payer: COMMERCIAL

## 2021-11-03 DIAGNOSIS — E89.1 POST-PANCREATECTOMY DIABETES (H): ICD-10-CM

## 2021-11-03 DIAGNOSIS — E13.9 POST-PANCREATECTOMY DIABETES (H): ICD-10-CM

## 2021-11-03 DIAGNOSIS — R39.15 URINARY URGENCY: ICD-10-CM

## 2021-11-03 DIAGNOSIS — Z98.890 S/P EXPLORATORY LAPAROTOMY: ICD-10-CM

## 2021-11-03 DIAGNOSIS — K62.3 RECTAL PROLAPSE: ICD-10-CM

## 2021-11-03 DIAGNOSIS — Z98.84 GASTRIC BYPASS STATUS FOR OBESITY: ICD-10-CM

## 2021-11-03 DIAGNOSIS — N81.4 UTEROVAGINAL PROLAPSE, UNSPECIFIED: Primary | ICD-10-CM

## 2021-11-03 DIAGNOSIS — Z90.410 POST-PANCREATECTOMY DIABETES (H): ICD-10-CM

## 2021-11-03 PROCEDURE — 99204 OFFICE O/P NEW MOD 45 MIN: CPT | Mod: 95 | Performed by: UROLOGY

## 2021-11-03 ASSESSMENT — PATIENT HEALTH QUESTIONNAIRE - PHQ9: SUM OF ALL RESPONSES TO PHQ QUESTIONS 1-9: 3

## 2021-11-03 NOTE — PROGRESS NOTES
Lynn is a 56 year old who is being evaluated via a billable video visit.      How would you like to obtain your AVS? MyChart  If the video visit is dropped, the invitation should be resent by: Text to cell phone: [phone]  Will anyone else be joining your video visit? No      Video Start Time: 1:28 PM    Assessment & Plan     Rectal prolapse  She has bothersome rectal prolapse and would like to proceed with reconstructive repair with a rectopexy.  Given that she has poor apical support also seen on defecography she is seeing me to discuss a concomitant procedure.  - Adult Urology Referral    Uterovaginal prolapse, unspecified  She does not have a bothersome vaginal bulge but on colorectal exam she had a large rectocele and on defecography she had a enterocele and poor apical support.  We discussed that I still need to have her come in for an exam to assess myself but we discussed the surgical options    We discussed that pelvic organ prolapse is essentially a woman's hernia and that although bothersome, by itself is not life threatening.  I discussed that I would recommend a hysterectomy at the time of surgery to optimize the repair.  The abdominal approach which can be done open or minimally invasively (laparoscopic or robotic assisted) would remove the uterus but leave the cervix behind (supracervical hysterectomy) and perform a sacrocolpopexy using mesh adjunct to the sacral promontory to support the top of the vagina or we could use sutures to support the top of the vagina to some strong ligaments    We discussed that when there is poor apical support she is more likely to have a failure of her rectopexy.  We discussed that usually we would do a concomitant mesh based sacrocolpopexy but that given her prior crohn's disease we may want to consider non mesh based.  We also discussed that given her multiple prior surgeries and pancreatectomy with transplant that she is at much higher risk for poor wound  healing and complications.  We could consider an apical suspension but again that would not be as durable.    We discussed that even though I would not recommend a concomitant midurethral sling at the same time, the recommendation if we are doing an open abdominal sacrocolpopexy is to do a concomitant pina colpossuspension to try to prevent stress incontinence afterwards.    Given the fact patient is post-menopausal we also discussed ovarian preservation versus prophylactic oophorectomy at the time of surgery provided that her ovaries appeared normal.  We discussed the fact that for sometime after menopause the ovaries are thought to produce some benefit for cardiovascular and bone health, but until when is unclear.  We discussed the risks of preservation to include further surgery for ovarian abnormalities and small risk of ovarian cancer.  We also discussed the current ACOG recommendations to at minimum to a bilateral salpingectomy. She wishes to have a BSO as long as feasible      We discussed that the risks to the procedure include but not limited to cardiovascular risks of anesthesia, nerve injury, bleeding, infection, injury to adjacent organs including bowel, bladder, and blood vessels, conversion to open procedure, de madison or worsening stress incontinence or urge incontinence, need for post-operative keen catheter, need for further procedures including for mesh extrusion or erosion.  Patient expressed understanding and agreeable to proceed.    Urinary urgency  Discussed that this is not likely to be changed by the surgery and she may need some pelvic floor therapy afterwards    Gastric bypass status for obesity/Post-pancreatectomy diabetes (H)/S/P exploratory laparotomy  All of these place her at higher surgical risk and we discussed for this reason we would not be attempting a minimally invasive surgery but would recommend open approach. We also discussed that if Dr Sheridan ultimately needed to do a  colostomy that day that we may not decide to do any uterovaginal prolapse repair    Plan for Abdominal supracervical hysterectomy (removal of uterus and leaving cervix behind), bilateral salpingo-oophorectomy (removal the fallopian tubes and ovaries), open sacrocolpopexy, pina colposuspension, cystoscopy    RTC for in person exam    50 minutes were spent today in reviewing Dr Sheridan's notes from colorectal, reviewing the pap smear result, the HPV results, direct patient care including surgical counseling, coordination of care and documentation    Nicole Rivera MD MPH  (she/her/hers)   of Urology  [facility]      Tyesha Bailey is a 56 year old who presents for the following health issues     HPI     She is sent to see me today by Dr Sheridan    First noted rectal prolapse about 2 years ago.  Has gotten worse to the point she is concerned at times that she wasn't sure she would be able to reduce it.    Has urinary urgency incontinence, getting a worse over the last several months.  Reports that she has asymptomatic bacteriuria.  Denies gross hematuria.  Denies vaginal bleeding.  Has dyspareunia secondary to dryness, has estrogen cream written for but does not remember to use it.    Does have a history of kidney stones.  Saw Dr Baez and Dr Aldana in the past    Pancreatectomy and auto islet transplant.  Also a couple surgeries for SBO.  Has history of Crohn's and had resection of ileum without any issues.    Denies history of abnormal pap smears.  Last one in Epic is in 2019 with Dr Hilliard, normal pap and negative HR HPV.    She fosters dogs.    Past Medical History:   Diagnosis Date     Benign paroxysmal positional vertigo     occ.      Calculus of kidney 05/2005    x1 on L side passed, several stones.  Has been tested for oxalate.     Chronic abdominal pain 07/17/2013     Chronic pain      Chronic pancreatitis 07/17/2013     Depression     also occ panic spells      Depressive disorder      Diabetes (H) 5/10/2013    Total Pancreatomy with Auto Islets Transplant     Dyspepsia 1999    H. pylori   treated     Headaches     still periodic HA's ;  often 5X/week     Hypertension 2016    Stress related     Iron deficiency anemia secondary to inadequate dietary iron intake 2003    relates to gastric bypass     Post-pancreatectomy diabetes melltius 2013     Sleep apnea     uses splint     Spasm of sphincter of Oddi     surgical + endoscopic stenting of pancreatic duct @ [facility] 06     Thyroid nodule 2016     Vaccination not carried out      Past Surgical History:   Procedure Laterality Date     ABDOMINOPLASTY  2002    Tummy tuck     APPENDECTOMY  1990     BUNIONECTOMY Right 1998     CBD Stent placement  2002    CBD stent; Dr. Presley      SECTION       CHOLECYSTECTOMY       COLONOSCOPY   &     colonoscopy     COLONOSCOPY       COLONOSCOPY N/A 3/31/2021    Procedure: COLONOSCOPY INCOMPLETE Aborted due to incomplete prep  will need to take additional prep and return tomorrow 21;  Surgeon: Ihsan Saenz MD;  Location: [facility]     COMBINED CYSTOSCOPY, RETROGRADES, URETEROSCOPY, LASER HOLMIUM LITHOTRIPSY URETER(S), INSERT STENT Right 2015    Procedure: COMBINED CYSTOSCOPY, RETROGRADES, URETEROSCOPY, LASER HOLMIUM LITHOTRIPSY URETER(S), INSERT STENT;  Surgeon: Kennedi Aldana MD;  [facility]     COMBINED CYSTOSCOPY, RETROGRADES, URETEROSCOPY, LASER HOLMIUM LITHOTRIPSY URETER(S), INSERT STENT Right 2015    Procedure: COMBINED CYSTOSCOPY, RETROGRADES, URETEROSCOPY, LASER HOLMIUM LITHOTRIPSY URETER(S), INSERT STENT;  Surgeon: Kennedi Aldana MD;  [facility]: [facility]     COSMETIC SURGERY  2002    Tummy tuck     CYSTECTOMY OVARIAN BENIGN Right      CYSTOSCOPY, RETROGRADES, INSERT STENT URETER(S), COMBINED  10/02/2012    Procedure: COMBINED CYSTOSCOPY, RETROGRADES, INSERT STENT URETER(S);  COMBINED  CYSTOSCOPY,  , INSERT LEFT STENT URETER;  Surgeon: Johny Baez MD;  Location: [facility]     ESOPHAGOSCOPY, GASTROSCOPY, DUODENOSCOPY (EGD), COMBINED N/A 01/24/2018    Procedure: COMBINED ESOPHAGOSCOPY, GASTROSCOPY, DUODENOSCOPY (EGD);  ESOPHAGOSCOPY, GASTROSCOPY, DUODENOSCOPY (EGD)    ;  Surgeon: Tamir Rodgers MD;  Location: [facility]     EXTRACORPOREAL SHOCK WAVE LITHOTRIPSY (ESWL)  10/16/2012    Procedure: EXTRACORPOREAL SHOCK WAVE LITHOTRIPSY (ESWL);  left EXTRACORPOREAL SHOCK WAVE LITHOTRIPSY (ESWL) ;  Surgeon: Johny Baez MD;  Location: [facility]     Gastric bypass NOS       HERNIA REPAIR  02/2015     IRRIGATION AND DEBRIDEMENT HAND, COMBINED Left 10/30/2020    Procedure: Left hand sharp excisional debridement of skin, subcutaneous tissue and fat with a scalpel, 2 x 1 x 1 cm.;  Surgeon: Demian Renteria MD;  Location: [facility]     LAP, LYSIS OF ADHESIONS       LAPAROSCOPIC LYSIS ADHESIONS N/A 02/20/2015    Procedure: LAPAROSCOPIC LYSIS ADHESIONS;  Surgeon: Aaron Early MD;  [facility]     LAPAROSCOPIC LYSIS ADHESIONS N/A 12/29/2015    Procedure: LAPAROSCOPIC LYSIS ADHESIONS;  Surgeon: Aaron Early MD;  [facility]     PANCREATECTOMY, TRANSPLANT AUTO ISLET CELL, COMBINED  05/10/2013    Procedure: COMBINED PANCREATECTOMY, TRANSPLANT AUTO ISLET CELL;  Pancreatectomy, Auto Islet Cell Transplant   hernia repair, jejunostomy tube and liver biopsies with Anesthesia General with block;  Surgeon: Aaron Early MD;  [facility]     Partial ileum resection  1992     REPAIR PTOSIS BROW BILATERAL Bilateral 06/09/2020    Procedure: BILATERAL BROW PTOSIS REPAIR;  Surgeon: Denise Alberts MD;  Location:  OR     Surgery for SBO  2015     TONSILLECTOMY, ADENOIDECTOMY, COMBINED  1997     TRANSPLANT  5/10/13    Pancreatic Auto-Islet Transplant        Social Connections:      Frequency of Communication with Friends and Family:      Frequency of Social Gatherings with Friends and Family:       Attends [employer] Services:      Active Member of Clubs or Organizations:      Attends Club or Organization Meetings:      Marital Status:      Social History     Tobacco Use     Smoking status: Never Smoker     Smokeless tobacco: Never Used   Substance Use Topics     Alcohol use: Not Currently     Alcohol/week: 0.0 standard drinks     Drug use: Not Currently       Current Outpatient Medications:      amylase-lipase-protease (VIOKACE) 79414-93555 units TABS tablet, Take 4-5 with meals and 2 with snacks, Disp: 500 tablet, Rfl: 11     calcium carbonate 600 mg-vitamin D 400 units (CALTRATE) 600-400 MG-UNIT per tablet, Take 1 tablet by mouth daily, Disp: , Rfl:      Continuous Blood Gluc  (DEXCOM G6 ) MODESTA, Use as directed to check blood glucose levels, Disp: 1 each, Rfl: 1     Continuous Blood Gluc Sensor (DEXCOM G6 SENSOR) MISC, Change every 10 days, Disp: 3 each, Rfl: 11     Continuous Blood Gluc Transmit (DEXCOM G6 TRANSMITTER) MISC, Change every 3 months, Disp: 1 each, Rfl: 3     DULoxetine (CYMBALTA) 20 MG capsule, Take 20 mg by mouth daily, Disp: , Rfl:      escitalopram (LEXAPRO) 20 MG tablet, Take 20 mg by mouth daily, Disp: , Rfl:      estradiol (ESTRACE) 0.1 MG/GM vaginal cream, PLACE 2 GRAMS VAGINALLY 2 TIMES WEEKLY, Disp: 42.5 g, Rfl: 1     famotidine (PEPCID) 40 MG tablet, Take 1 tablet (40 mg) by mouth 2 times daily as needed for heartburn, Disp: 180 tablet, Rfl: 3     FIASP PENFILL 100 UNIT/ML SOCT, Inject 0.5-4 Units Subcutaneous 4 times daily (with meals and nightly), Disp: 15 mL, Rfl: 5     gabapentin (NEURONTIN) 300 MG capsule, Take 300 mg by mouth nightly as needed, Disp: , Rfl:      Glucagon (GVOKE HYPOPEN 1-PACK) 1 MG/0.2ML SOAJ, Inject 1 mg Subcutaneous once as needed (for hypoglycemia with loss of consciousness), Disp: 15 mL, Rfl: 5     glucose 40 % GEL, Take 15-30 g by mouth every 15 minutes as needed., Disp: 1 Tube, Rfl: 11     hydrOXYzine (ATARAX) 25 MG tablet, TAKE 1-2  "TABLETS BY MOUTH 2 TIMES DAILY AS NEEDED FOR ANXIETY, Disp: , Rfl: 0     insulin aspart (NOVOLOG PENFILL) 100 UNIT/ML cartridge, Inject 0.5-2 units subcutaneously 4 times daily with meals and at bedtime, Disp: 15 mL, Rfl: 11     insulin glargine (LANTUS PEN) 100 UNIT/ML pen, Inject 6 units daily, Disp: 15 mL, Rfl: 3     levothyroxine (SYNTHROID/LEVOTHROID) 100 MCG tablet, Take 1 tablet (100 mcg) by mouth daily, Disp: 90 tablet, Rfl: 3     loratadine (CLARITIN) 10 MG tablet, Take 10 mg by mouth daily as needed , Disp: , Rfl:      metroNIDAZOLE (FLAGYL) 500 MG tablet, Take 1 tablet (500 mg) by mouth every 6 hours At 8:00 am, 2:00 pm, 8:00 pm the day prior to your surgery with neomycin and zofran., Disp: 3 tablet, Rfl: 0     modafinil (PROVIGIL) 200 MG tablet, Take 400 mg by mouth daily, Disp: , Rfl:      neomycin (MYCIFRADIN) 500 MG tablet, Take 2 tablets (1,000 mg) by mouth every 6 hours At 8:00 am, 2:00 pm, 8:00 pm the day prior to your surgery with flagyl and zofran., Disp: 6 tablet, Rfl: 0     Nutritional Supplements (BOOST HIGH PROTEIN) LIQD, After above baseline labs are drawn, give: 6 mL/kg to maximum of 360 mL; the beverage is to be consumed within 5 minutes., Disp: , Rfl: 0     omeprazole (PRILOSEC) 40 MG DR capsule, Take 1 capsule (40 mg) by mouth 2 times daily Take 30-60 minutes before a meal., Disp: 180 capsule, Rfl: 3     ondansetron (ZOFRAN) 4 MG tablet, Take 1 tablet (4 mg) by mouth every 6 hours At 8:00 am, 2:00 pm, 8:00 pm the day prior to your surgery with neomycin and flagyl., Disp: 3 tablet, Rfl: 0     ondansetron (ZOFRAN-ODT) 8 MG ODT tab, Take 8 mg by mouth 3 times daily as needed for nausea, Disp: , Rfl:      polyethylene glycol (MIRALAX) 17 g packet, Take 238 g by mouth See Admin Instructions Starting at 4 pm night prior to surgery. Refer to \"Getting Ready for Surgery\" instructions., Disp: 14 packet, Rfl: 0     STATIN NOT PRESCRIBED (INTENTIONAL), Please choose reason not prescribed from " choices below., Disp:  , Rfl:      traZODone (DESYREL) 50 MG tablet, Take 50 mg by mouth nightly as needed for sleep, Disp: , Rfl:      Allergies   Allergen Reactions     Corticosteroids Other (See Comments)     All oral, IV and injectable steroids are contraindicated (unless in life threatening situations) in Islet Auto transplant recipients. They can cause irreversible loss of islet cell function. Please contact patient's transplant care coordinator, Erlinda Multani RN BSN at [phone]/pager: [phone] and endocrinologist prior to administration.       Povidone Iodine Hives     Causes skin to blister     Naproxen      Other reaction(s): Abdominal Pain  Pt allergic to Naprosyn     Nsaids      naprosyn = GI upset     Povidone Iodine      blisters     Sulfasalazine Nausea and Nausea and Vomiting         Objective         Vitals:  No vitals were obtained today due to virtual visit.    Physical Exam   GENERAL: Healthy, alert and no distress  EYES: Eyes grossly normal to inspection.  No discharge or erythema, or obvious scleral/conjunctival abnormalities.  RESP: No audible wheeze, cough, or visible cyanosis.  No visible retractions or increased work of breathing.    SKIN: Visible skin clear. No significant rash, abnormal pigmentation or lesions.  NEURO: Cranial nerves grossly intact.  Mentation and speech appropriate for age.  PSYCH: Mentation appears normal, affect normal/bright, judgement and insight intact, normal speech and appearance well-groomed.    Video-Visit Details    Type of service:  Video Visit    Video End Time:1:51 PM    Originating Location (pt. Location): Home    Distant Location (provider location):  [facility] [place]     Platform used for Video Visit: [employer]

## 2021-11-03 NOTE — PATIENT INSTRUCTIONS
Websites with free information:    [employer] patient website: www.voicesforpfd.org  -Sacrocolpopexy  -What to Expect with Surgery    Total Control Program: www.totalcontrolprogram.com    Procedure: Abdominal supracervical hysterectomy (removal of uterus and leaving cervix behind), bilateral salpingo-oophorectomy (removal the fallopian tubes and ovaries), sacrocolpopexy (using material to support the top of the vagina to a strong ligament near your tailbone) versus apical suspension (using suture to support the top of the vagina), pina colposuspension (sutures placed to try to prevent urine leakage with coughing sneezing), cystoscopy (bladder safety check that is done)    Surgery scheduler [phone]  Charmaine COVARRUBIAS care coordinator [phone]    It was a pleasure meeting with you today.  Thank you for allowing me and my team the privilege of caring for you today.  YOU are the reason we are here, and I truly hope we provided you with the excellent service you deserve.  Please let us know if there is anything else we can do for you so that we can be sure you are leaving completely satisfied with your care experience.

## 2021-11-05 ENCOUNTER — TELEPHONE (OUTPATIENT)
Dept: SURGERY | Facility: CLINIC | Age: 56
End: 2021-11-05
Payer: MEDICARE

## 2021-11-08 ENCOUNTER — TELEPHONE (OUTPATIENT)
Dept: UROLOGY | Facility: CLINIC | Age: 56
End: 2021-11-08
Payer: MEDICARE

## 2021-11-08 NOTE — TELEPHONE ENCOUNTER
FUTURE VISIT INFORMATION      SURGERY INFORMATION:    Date: 2/1/22    [facility]    Surgeon:  Uriah Sheridan MD Fok, Nicole Zaman MD    Anesthesia Type:  general    Procedure: RECTOPEXY, OPEN SIGMOIDOSCOPY, FLEXIBLE supracervical hysterectomy, bilateral salpingooophrectomy SACROCOLPOPEXY versus uterosacral ligament suspension, pina colposuspension CYSTOSCOPY    Consult: ov 9/27    RECORDS REQUESTED FROM:        Primary Care Provider: Maryana Brooks MD- [facility]    Pertinent Medical History: tayler    Most recent Sleep Study:  4/27/17

## 2021-11-08 NOTE — TELEPHONE ENCOUNTER
Tier 2 Case Request received to schedule a combo surgery with Dr. Uriah Sheridan and Dr. Nicole Rivera.    Spoke with Dr. Rivera's  Loly, the\ next date when we can get both surgeons together is 2/1/2022. HOLD placed on Dr. Sheridan's calendar on that date.    Additional Instructions for the Case  Multi-surgeon case:  Yes, Dr Rivera and she will put in her own orders   Time in minutes:  240  Anesthesia: General  Prep: Full w/ abx  Pre-Op: PAC  Labs: yes  WOC: no  Special equipment: no  Special Instructions: no    Schedule with Sabrina Lopez NP 2 weeks after surgery.  Schedule with Surgeon 3-4 weeks after Sabrina Lopez NP    On 11/8/2021:  Dr. Rivera's  Loly spoke with patient via phone, completed the following scheduling, Surgery Packet will be sent to patient via [employer] and Mail including related scheduling info;mation and prep instructions:    Patient is scheduled for surgery with Dr. Dr. Sheridan and Dr. Rivera    Date of Surgery: 2/1/2022    Location: [place] OR    Pre op with Provider Dr. Sheridan on 9/27/2021 at 3:30 PM    H&P: Scheduled with PAC on 1/18/2022 at 10:00 AM    [place] [facility] [place]18[place]21 at 11:30 AM    Pre-procedure COVID-19 Test: [facility] at 1/28/2022 at 10:45 AM    Post-operative appointments:    - Sabrina Lopez NP, on 2/23/2021 at 11:00 AM    - Dr. Uriah Sheridan on 3/21/2021 at 11:00 AM    Surgery packet:will send via [employer] and Mail    Additional comments: awaiting further appointments for Dr. Rivera's team

## 2021-11-08 NOTE — CONFIDENTIAL NOTE
Patient is scheduled for surgery with Dr. Rivera and dr Sheridan    Spoke with: Lynn Mendes voice mail with: n/a    Date of Surgery: 2/1/22    Location: [facility] OR    H&P: Scheduled with PAC 1/18/22    Pre-procedure COVID-19 Test: 1/28/22    Additional imaging/appointments: n/a    Surgery packet: to be mailed by 11/9/21     Additional comments: n/a

## 2021-11-23 ENCOUNTER — HOSPITAL ENCOUNTER (INPATIENT)
Facility: CLINIC | Age: 56
LOS: 3 days | Discharge: HOME | End: 2021-11-26
Attending: EMERGENCY MEDICINE | Admitting: STUDENT IN AN ORGANIZED HEALTH CARE EDUCATION/TRAINING PROGRAM
Payer: COMMERCIAL

## 2021-11-23 ENCOUNTER — APPOINTMENT (OUTPATIENT)
Dept: CT IMAGING | Facility: CLINIC | Age: 56
End: 2021-11-23
Attending: EMERGENCY MEDICINE
Payer: COMMERCIAL

## 2021-11-23 DIAGNOSIS — K56.609 SMALL BOWEL OBSTRUCTION (H): ICD-10-CM

## 2021-11-23 DIAGNOSIS — N17.9 ACUTE KIDNEY INJURY (H): ICD-10-CM

## 2021-11-23 LAB
ALBUMIN SERPL-MCNC: 3.4 G/DL (ref 3.4–5)
ALBUMIN UR-MCNC: 10 MG/DL
ALP SERPL-CCNC: 156 U/L (ref 40–150)
ALT SERPL W P-5'-P-CCNC: 47 U/L (ref 0–50)
ANION GAP SERPL CALCULATED.3IONS-SCNC: 6 MMOL/L (ref 3–14)
APPEARANCE UR: CLEAR
AST SERPL W P-5'-P-CCNC: 41 U/L (ref 0–45)
BASOPHILS # BLD AUTO: 0.1 10E3/UL (ref 0–0.2)
BASOPHILS NFR BLD AUTO: 1 %
BILIRUB SERPL-MCNC: 0.2 MG/DL (ref 0.2–1.3)
BILIRUB UR QL STRIP: NEGATIVE
BUN SERPL-MCNC: 25 MG/DL (ref 7–30)
CALCIUM SERPL-MCNC: 8.7 MG/DL (ref 8.5–10.1)
CHLORIDE BLD-SCNC: 117 MMOL/L (ref 94–109)
CO2 SERPL-SCNC: 21 MMOL/L (ref 20–32)
COLOR UR AUTO: ABNORMAL
CREAT BLD-MCNC: 1.6 MG/DL (ref 0.5–1)
CREAT SERPL-MCNC: 1.48 MG/DL (ref 0.52–1.04)
EOSINOPHIL # BLD AUTO: 0.2 10E3/UL (ref 0–0.7)
EOSINOPHIL NFR BLD AUTO: 2 %
ERYTHROCYTE [DISTWIDTH] IN BLOOD BY AUTOMATED COUNT: 13.2 % (ref 10–15)
GFR SERPL CREATININE-BSD FRML MDRD: 35 ML/MIN/1.73M2
GFR SERPL CREATININE-BSD FRML MDRD: 39 ML/MIN/1.73M2
GLUCOSE BLD-MCNC: 164 MG/DL (ref 70–99)
GLUCOSE UR STRIP-MCNC: NEGATIVE MG/DL
HCT VFR BLD AUTO: 40 % (ref 35–47)
HGB BLD-MCNC: 12.5 G/DL (ref 11.7–15.7)
HGB UR QL STRIP: NEGATIVE
IMM GRANULOCYTES # BLD: 0 10E3/UL
IMM GRANULOCYTES NFR BLD: 0 %
KETONES UR STRIP-MCNC: NEGATIVE MG/DL
LEUKOCYTE ESTERASE UR QL STRIP: NEGATIVE
LIPASE SERPL-CCNC: 10 U/L (ref 73–393)
LYMPHOCYTES # BLD AUTO: 2.4 10E3/UL (ref 0.8–5.3)
LYMPHOCYTES NFR BLD AUTO: 24 %
MCH RBC QN AUTO: 29.8 PG (ref 26.5–33)
MCHC RBC AUTO-ENTMCNC: 31.3 G/DL (ref 31.5–36.5)
MCV RBC AUTO: 95 FL (ref 78–100)
MONOCYTES # BLD AUTO: 1.1 10E3/UL (ref 0–1.3)
MONOCYTES NFR BLD AUTO: 11 %
NEUTROPHILS # BLD AUTO: 6.2 10E3/UL (ref 1.6–8.3)
NEUTROPHILS NFR BLD AUTO: 62 %
NITRATE UR QL: NEGATIVE
NRBC # BLD AUTO: 0 10E3/UL
NRBC BLD AUTO-RTO: 0 /100
PH UR STRIP: 6 [PH] (ref 5–7)
PLATELET # BLD AUTO: 386 10E3/UL (ref 150–450)
POTASSIUM BLD-SCNC: 4.2 MMOL/L (ref 3.4–5.3)
PROT SERPL-MCNC: 6.9 G/DL (ref 6.8–8.8)
RBC # BLD AUTO: 4.2 10E6/UL (ref 3.8–5.2)
RBC URINE: 3 /HPF
SARS-COV-2 RNA RESP QL NAA+PROBE: NEGATIVE
SODIUM SERPL-SCNC: 144 MMOL/L (ref 133–144)
SP GR UR STRIP: 1.02 (ref 1–1.03)
SQUAMOUS EPITHELIAL: 1 /HPF
UROBILINOGEN UR STRIP-MCNC: NORMAL MG/DL
WBC # BLD AUTO: 9.9 10E3/UL (ref 4–11)
WBC URINE: 1 /HPF

## 2021-11-23 PROCEDURE — 99285 EMERGENCY DEPT VISIT HI MDM: CPT | Mod: 25

## 2021-11-23 PROCEDURE — 96375 TX/PRO/DX INJ NEW DRUG ADDON: CPT

## 2021-11-23 PROCEDURE — 83605 ASSAY OF LACTIC ACID: CPT | Performed by: EMERGENCY MEDICINE

## 2021-11-23 PROCEDURE — 81001 URINALYSIS AUTO W/SCOPE: CPT | Performed by: EMERGENCY MEDICINE

## 2021-11-23 PROCEDURE — 96361 HYDRATE IV INFUSION ADD-ON: CPT

## 2021-11-23 PROCEDURE — 36415 COLL VENOUS BLD VENIPUNCTURE: CPT | Performed by: EMERGENCY MEDICINE

## 2021-11-23 PROCEDURE — 74176 CT ABD & PELVIS W/O CONTRAST: CPT | Mod: MG

## 2021-11-23 PROCEDURE — 120N000004 HC R&B MS OVERFLOW

## 2021-11-23 PROCEDURE — 99223 1ST HOSP IP/OBS HIGH 75: CPT | Mod: AI | Performed by: STUDENT IN AN ORGANIZED HEALTH CARE EDUCATION/TRAINING PROGRAM

## 2021-11-23 PROCEDURE — 85025 COMPLETE CBC W/AUTO DIFF WBC: CPT | Performed by: EMERGENCY MEDICINE

## 2021-11-23 PROCEDURE — 82565 ASSAY OF CREATININE: CPT

## 2021-11-23 PROCEDURE — 80053 COMPREHEN METABOLIC PANEL: CPT | Performed by: EMERGENCY MEDICINE

## 2021-11-23 PROCEDURE — 258N000003 HC RX IP 258 OP 636: Performed by: EMERGENCY MEDICINE

## 2021-11-23 PROCEDURE — 96376 TX/PRO/DX INJ SAME DRUG ADON: CPT

## 2021-11-23 PROCEDURE — 87635 SARS-COV-2 COVID-19 AMP PRB: CPT | Performed by: EMERGENCY MEDICINE

## 2021-11-23 PROCEDURE — C9803 HOPD COVID-19 SPEC COLLECT: HCPCS

## 2021-11-23 PROCEDURE — 96374 THER/PROPH/DIAG INJ IV PUSH: CPT

## 2021-11-23 PROCEDURE — 83690 ASSAY OF LIPASE: CPT | Performed by: EMERGENCY MEDICINE

## 2021-11-23 PROCEDURE — 250N000011 HC RX IP 250 OP 636: Performed by: EMERGENCY MEDICINE

## 2021-11-23 RX ORDER — ONDANSETRON 2 MG/ML
4 INJECTION INTRAMUSCULAR; INTRAVENOUS EVERY 30 MIN PRN
Status: COMPLETED | OUTPATIENT
Start: 2021-11-23 | End: 2021-11-24

## 2021-11-23 RX ORDER — HYDROMORPHONE HCL IN WATER/PF 6 MG/30 ML
.2-.4 PATIENT CONTROLLED ANALGESIA SYRINGE INTRAVENOUS
Status: DISCONTINUED | OUTPATIENT
Start: 2021-11-23 | End: 2021-11-24

## 2021-11-23 RX ORDER — SODIUM CHLORIDE 9 MG/ML
INJECTION, SOLUTION INTRAVENOUS CONTINUOUS
Status: DISCONTINUED | OUTPATIENT
Start: 2021-11-23 | End: 2021-11-24

## 2021-11-23 RX ORDER — HYDROMORPHONE HCL IN WATER/PF 6 MG/30 ML
.2-.4 PATIENT CONTROLLED ANALGESIA SYRINGE INTRAVENOUS EVERY 4 HOURS PRN
Status: DISCONTINUED | OUTPATIENT
Start: 2021-11-23 | End: 2021-11-23

## 2021-11-23 RX ORDER — HYDROMORPHONE HYDROCHLORIDE 1 MG/ML
0.5 INJECTION, SOLUTION INTRAMUSCULAR; INTRAVENOUS; SUBCUTANEOUS
Status: COMPLETED | OUTPATIENT
Start: 2021-11-23 | End: 2021-11-23

## 2021-11-23 RX ADMIN — HYDROMORPHONE HYDROCHLORIDE 0.5 MG: 1 INJECTION, SOLUTION INTRAMUSCULAR; INTRAVENOUS; SUBCUTANEOUS at 23:00

## 2021-11-23 RX ADMIN — HYDROMORPHONE HYDROCHLORIDE 0.5 MG: 1 INJECTION, SOLUTION INTRAMUSCULAR; INTRAVENOUS; SUBCUTANEOUS at 21:39

## 2021-11-23 RX ADMIN — SODIUM CHLORIDE 1000 ML: 9 INJECTION, SOLUTION INTRAVENOUS at 22:41

## 2021-11-23 RX ADMIN — HYDROMORPHONE HYDROCHLORIDE 0.5 MG: 1 INJECTION, SOLUTION INTRAMUSCULAR; INTRAVENOUS; SUBCUTANEOUS at 22:41

## 2021-11-23 RX ADMIN — ONDANSETRON 4 MG: 2 INJECTION INTRAMUSCULAR; INTRAVENOUS at 23:12

## 2021-11-23 RX ADMIN — ONDANSETRON 4 MG: 2 INJECTION INTRAMUSCULAR; INTRAVENOUS at 21:39

## 2021-11-23 ASSESSMENT — ENCOUNTER SYMPTOMS
NAUSEA: 1
DYSURIA: 0
ABDOMINAL PAIN: 1
FEVER: 0
CONSTIPATION: 0
DIFFICULTY URINATING: 0
ABDOMINAL DISTENTION: 1
VOMITING: 1
APPETITE CHANGE: 0

## 2021-11-23 ASSESSMENT — MIFFLIN-ST. JEOR: SCORE: 1077.56

## 2021-11-24 LAB
ANION GAP SERPL CALCULATED.3IONS-SCNC: 7 MMOL/L (ref 3–14)
BUN SERPL-MCNC: 24 MG/DL (ref 7–30)
CALCIUM SERPL-MCNC: 8.4 MG/DL (ref 8.5–10.1)
CHLORIDE BLD-SCNC: 109 MMOL/L (ref 94–109)
CO2 SERPL-SCNC: 25 MMOL/L (ref 20–32)
CREAT SERPL-MCNC: 0.9 MG/DL (ref 0.52–1.04)
ERYTHROCYTE [DISTWIDTH] IN BLOOD BY AUTOMATED COUNT: 13.2 % (ref 10–15)
GFR SERPL CREATININE-BSD FRML MDRD: 71 ML/MIN/1.73M2
GLUCOSE BLD-MCNC: 253 MG/DL (ref 70–99)
GLUCOSE BLDC GLUCOMTR-MCNC: 112 MG/DL (ref 70–99)
GLUCOSE BLDC GLUCOMTR-MCNC: 120 MG/DL (ref 70–99)
GLUCOSE BLDC GLUCOMTR-MCNC: 145 MG/DL (ref 70–99)
GLUCOSE BLDC GLUCOMTR-MCNC: 189 MG/DL (ref 70–99)
GLUCOSE BLDC GLUCOMTR-MCNC: 223 MG/DL (ref 70–99)
HCT VFR BLD AUTO: 37.8 % (ref 35–47)
HGB BLD-MCNC: 11.6 G/DL (ref 11.7–15.7)
LACTATE SERPL-SCNC: 0.9 MMOL/L (ref 0.7–2)
MCH RBC QN AUTO: 29.4 PG (ref 26.5–33)
MCHC RBC AUTO-ENTMCNC: 30.7 G/DL (ref 31.5–36.5)
MCV RBC AUTO: 96 FL (ref 78–100)
PLATELET # BLD AUTO: 356 10E3/UL (ref 150–450)
POTASSIUM BLD-SCNC: 3.8 MMOL/L (ref 3.4–5.3)
RBC # BLD AUTO: 3.94 10E6/UL (ref 3.8–5.2)
SODIUM SERPL-SCNC: 141 MMOL/L (ref 133–144)
WBC # BLD AUTO: 11.2 10E3/UL (ref 4–11)

## 2021-11-24 PROCEDURE — 36415 COLL VENOUS BLD VENIPUNCTURE: CPT | Performed by: STUDENT IN AN ORGANIZED HEALTH CARE EDUCATION/TRAINING PROGRAM

## 2021-11-24 PROCEDURE — 99233 SBSQ HOSP IP/OBS HIGH 50: CPT | Performed by: INTERNAL MEDICINE

## 2021-11-24 PROCEDURE — C9113 INJ PANTOPRAZOLE SODIUM, VIA: HCPCS | Performed by: INTERNAL MEDICINE

## 2021-11-24 PROCEDURE — 96375 TX/PRO/DX INJ NEW DRUG ADDON: CPT

## 2021-11-24 PROCEDURE — 258N000003 HC RX IP 258 OP 636: Performed by: STUDENT IN AN ORGANIZED HEALTH CARE EDUCATION/TRAINING PROGRAM

## 2021-11-24 PROCEDURE — 250N000011 HC RX IP 250 OP 636: Performed by: STUDENT IN AN ORGANIZED HEALTH CARE EDUCATION/TRAINING PROGRAM

## 2021-11-24 PROCEDURE — 250N000011 HC RX IP 250 OP 636: Performed by: INTERNAL MEDICINE

## 2021-11-24 PROCEDURE — 250N000011 HC RX IP 250 OP 636

## 2021-11-24 PROCEDURE — 85014 HEMATOCRIT: CPT | Performed by: STUDENT IN AN ORGANIZED HEALTH CARE EDUCATION/TRAINING PROGRAM

## 2021-11-24 PROCEDURE — 120N000004 HC R&B MS OVERFLOW

## 2021-11-24 PROCEDURE — 99222 1ST HOSP IP/OBS MODERATE 55: CPT | Performed by: SURGERY

## 2021-11-24 PROCEDURE — 80048 BASIC METABOLIC PNL TOTAL CA: CPT | Performed by: STUDENT IN AN ORGANIZED HEALTH CARE EDUCATION/TRAINING PROGRAM

## 2021-11-24 PROCEDURE — 250N000011 HC RX IP 250 OP 636: Performed by: EMERGENCY MEDICINE

## 2021-11-24 PROCEDURE — 96374 THER/PROPH/DIAG INJ IV PUSH: CPT

## 2021-11-24 PROCEDURE — 120N000006 HC R&B PEDS

## 2021-11-24 RX ORDER — LORAZEPAM 2 MG/ML
.5-1 INJECTION INTRAMUSCULAR EVERY 4 HOURS PRN
Status: DISCONTINUED | OUTPATIENT
Start: 2021-11-24 | End: 2021-11-26 | Stop reason: HOSPADM

## 2021-11-24 RX ORDER — DEXTROSE MONOHYDRATE 25 G/50ML
25-50 INJECTION, SOLUTION INTRAVENOUS
Status: DISCONTINUED | OUTPATIENT
Start: 2021-11-24 | End: 2021-11-26 | Stop reason: HOSPADM

## 2021-11-24 RX ORDER — NALOXONE HYDROCHLORIDE 0.4 MG/ML
0.4 INJECTION, SOLUTION INTRAMUSCULAR; INTRAVENOUS; SUBCUTANEOUS
Status: DISCONTINUED | OUTPATIENT
Start: 2021-11-24 | End: 2021-11-26 | Stop reason: HOSPADM

## 2021-11-24 RX ORDER — HYDROMORPHONE HYDROCHLORIDE 1 MG/ML
INJECTION, SOLUTION INTRAMUSCULAR; INTRAVENOUS; SUBCUTANEOUS
Status: COMPLETED
Start: 2021-11-24 | End: 2021-11-24

## 2021-11-24 RX ORDER — NALOXONE HYDROCHLORIDE 0.4 MG/ML
0.2 INJECTION, SOLUTION INTRAMUSCULAR; INTRAVENOUS; SUBCUTANEOUS
Status: DISCONTINUED | OUTPATIENT
Start: 2021-11-24 | End: 2021-11-26 | Stop reason: HOSPADM

## 2021-11-24 RX ORDER — ONDANSETRON 4 MG/1
4 TABLET, ORALLY DISINTEGRATING ORAL EVERY 6 HOURS PRN
Status: DISCONTINUED | OUTPATIENT
Start: 2021-11-24 | End: 2021-11-26 | Stop reason: HOSPADM

## 2021-11-24 RX ORDER — PROCHLORPERAZINE 25 MG
25 SUPPOSITORY, RECTAL RECTAL EVERY 12 HOURS PRN
Status: DISCONTINUED | OUTPATIENT
Start: 2021-11-24 | End: 2021-11-26 | Stop reason: HOSPADM

## 2021-11-24 RX ORDER — ONDANSETRON 2 MG/ML
4 INJECTION INTRAMUSCULAR; INTRAVENOUS EVERY 6 HOURS PRN
Status: DISCONTINUED | OUTPATIENT
Start: 2021-11-24 | End: 2021-11-26 | Stop reason: HOSPADM

## 2021-11-24 RX ORDER — PROCHLORPERAZINE MALEATE 10 MG
10 TABLET ORAL EVERY 6 HOURS PRN
Status: DISCONTINUED | OUTPATIENT
Start: 2021-11-24 | End: 2021-11-26 | Stop reason: HOSPADM

## 2021-11-24 RX ORDER — LIDOCAINE 40 MG/G
CREAM TOPICAL
Status: DISCONTINUED | OUTPATIENT
Start: 2021-11-24 | End: 2021-11-26 | Stop reason: HOSPADM

## 2021-11-24 RX ORDER — HYDROMORPHONE HYDROCHLORIDE 1 MG/ML
.3-.5 INJECTION, SOLUTION INTRAMUSCULAR; INTRAVENOUS; SUBCUTANEOUS
Status: DISCONTINUED | OUTPATIENT
Start: 2021-11-24 | End: 2021-11-26 | Stop reason: HOSPADM

## 2021-11-24 RX ORDER — HYDRALAZINE HYDROCHLORIDE 20 MG/ML
10 INJECTION INTRAMUSCULAR; INTRAVENOUS EVERY 4 HOURS PRN
Status: DISCONTINUED | OUTPATIENT
Start: 2021-11-24 | End: 2021-11-26 | Stop reason: HOSPADM

## 2021-11-24 RX ORDER — NICOTINE POLACRILEX 4 MG
15-30 LOZENGE BUCCAL
Status: DISCONTINUED | OUTPATIENT
Start: 2021-11-24 | End: 2021-11-26 | Stop reason: HOSPADM

## 2021-11-24 RX ORDER — SODIUM CHLORIDE 9 MG/ML
INJECTION, SOLUTION INTRAVENOUS CONTINUOUS
Status: DISCONTINUED | OUTPATIENT
Start: 2021-11-24 | End: 2021-11-25

## 2021-11-24 RX ADMIN — SODIUM CHLORIDE: 9 INJECTION, SOLUTION INTRAVENOUS at 21:15

## 2021-11-24 RX ADMIN — PANTOPRAZOLE SODIUM 40 MG: 40 INJECTION, POWDER, FOR SOLUTION INTRAVENOUS at 20:17

## 2021-11-24 RX ADMIN — PROCHLORPERAZINE EDISYLATE 10 MG: 5 INJECTION INTRAMUSCULAR; INTRAVENOUS at 12:01

## 2021-11-24 RX ADMIN — HYDROMORPHONE HYDROCHLORIDE 0.3 MG: 1 INJECTION, SOLUTION INTRAMUSCULAR; INTRAVENOUS; SUBCUTANEOUS at 08:47

## 2021-11-24 RX ADMIN — LORAZEPAM 0.5 MG: 2 INJECTION INTRAMUSCULAR; INTRAVENOUS at 17:26

## 2021-11-24 RX ADMIN — ONDANSETRON 4 MG: 2 INJECTION INTRAMUSCULAR; INTRAVENOUS at 00:03

## 2021-11-24 RX ADMIN — HYDROMORPHONE HYDROCHLORIDE 0.4 MG: 0.2 INJECTION, SOLUTION INTRAMUSCULAR; INTRAVENOUS; SUBCUTANEOUS at 00:01

## 2021-11-24 RX ADMIN — SODIUM CHLORIDE: 9 INJECTION, SOLUTION INTRAVENOUS at 11:40

## 2021-11-24 RX ADMIN — ONDANSETRON 4 MG: 2 INJECTION INTRAMUSCULAR; INTRAVENOUS at 06:58

## 2021-11-24 RX ADMIN — PROCHLORPERAZINE EDISYLATE 10 MG: 5 INJECTION INTRAMUSCULAR; INTRAVENOUS at 01:43

## 2021-11-24 RX ADMIN — HYDROMORPHONE HYDROCHLORIDE 0.3 MG: 1 INJECTION, SOLUTION INTRAMUSCULAR; INTRAVENOUS; SUBCUTANEOUS at 11:50

## 2021-11-24 RX ADMIN — ONDANSETRON 4 MG: 2 INJECTION INTRAMUSCULAR; INTRAVENOUS at 16:13

## 2021-11-24 RX ADMIN — SODIUM CHLORIDE: 9 INJECTION, SOLUTION INTRAVENOUS at 02:19

## 2021-11-24 ASSESSMENT — ACTIVITIES OF DAILY LIVING (ADL)
ADLS_ACUITY_SCORE: 6
ADLS_ACUITY_SCORE: 14
ADLS_ACUITY_SCORE: 6
ADLS_ACUITY_SCORE: 14
ADLS_ACUITY_SCORE: 6
ADLS_ACUITY_SCORE: 14
ADLS_ACUITY_SCORE: 6

## 2021-11-24 ASSESSMENT — MIFFLIN-ST. JEOR: SCORE: 1092.53

## 2021-11-24 NOTE — ED PROVIDER NOTES
History   Chief Complaint:  Flank Pain and Abdominal Pain       The history is provided by the patient.      Lynn Thompson is a 56 year old female with history of diabetes, kidney stones, and bowel obstructions who presents with left-sided abdominal pain. She reports this abdominal pain and bloating beginning about 2-3 hours ago, followed by nausea and two episodes of vomiting. The patient notes that she was able to eat and had a bowel movement earlier today. She has not had a bowel movement since the pain began, but she has passed gas in this time. She denies any urinary symptoms nor fever. The patient reports that her symptoms are similar to past bowel obstructions. She has needed surgery for this in the past, with her last procedure occurring about 3-4 years ago. The patient also confirms history of kidney stones, but is not sure if this feels similar.     Review of Systems   Constitutional: Negative for appetite change and fever.   Gastrointestinal: Positive for abdominal distention, abdominal pain, nausea and vomiting. Negative for constipation.   Genitourinary: Negative for difficulty urinating and dysuria.   All other systems reviewed and are negative.    Allergies:  Corticosteroids  Povidone Iodine  Naproxen  Nsaids  Povidone Iodine  Sulfasalazine    Medications:  Viokace   Cymbalta  Caltrate  Lexapro  Pepcid  Neurontin  Atarax  Levothyroxine  Flagyl  Provigil  Claritin  Neomycin  Zofran  Prilosec  Desyrel    Past Medical History:     Kidney stones   Chronic abdominal pain  Chronic pancreatitis  Depressive disorder  Diabetes  Dyspepsia  Hypertension  Hypothyroidism   Benign paroxysmal positional vertigo  Iron deficiency anemia  Sleep apnea  Thyroid nodule  Spasm of sphincter of Oddi  Asplenia  Exocrine pancreatic insufficiency  Anxiety  Rectal prolapse      Past Surgical History:    Abdominoplasty  Appendectomy  Bunionectomy  Gastric bypass   CBD stent placement   section  Colonoscopy  "x3  Combined cystoscopy, retrogrades, ureteroscopy, laser holmium lithotripsy, insert stent x2  Cystectomy ovarian benign  Combined cystoscopy, retrogrades, insert stent   Combined esophagoscopy, gastroscopy, and duodenoscopy   Extracorporeal shock wave lithotripsy  Hernia repair  Irrigation and debridement, hand  Laparoscopic lysis adhesions  Pancreatomy, transplant auto islet cell  Partial ileum resection  Bilateral brow ptosis repair  SBO surgery  Tonsillectomy & adenoidectomy    Family History:    COPD  Kidney stones  Depression  Asthma  Substance abuse    Social History:  The patient presents with her daughter.     Physical Exam     Patient Vitals for the past 24 hrs:   BP Temp Temp src Pulse Resp SpO2 Height Weight   11/23/21 2102 (!) 153/116 98.3  F (36.8  C) Temporal 74 18 98 % 1.626 m (5' 4\") 51.3 kg (113 lb)       Physical Exam    General: Lying on bed, appears uncomfortable  Eyes:  The pupils are equal and round    Conjunctivae and sclerae are normal  ENT:    Wearing a mask  Neck:  Normal range of motion  CV:  Regular rate, regular rhythm     Skin warm and well perfused   Resp:  Non labored breathing on room air    No tachypnea    No cough heard    Lungs clear bilaterally  GI:  Abdomen is soft, there is no rigidity    No distension    No rebound tenderness     Tender on left side of abdomen  MS:  Normal muscular tone  Skin:  No rash or acute skin lesions noted  Neuro:   Awake, alert.      Speech is normal and fluent.    Face is symmetric.     Moves all extremities equally  Psych: Normal affect.  Appropriate interactions.     Emergency Department Course     Imaging:  Abd/pelvis CT no contrast - Stone Protocol   Final Result   IMPRESSION:    1.  Mid small bowel obstruction, etiology uncertain.   2.  Resolution of previous colitis.   3.  Tiny renal calculi, no hydronephrosis.         Report per radiology    Laboratory:  Labs Ordered and Resulted from Time of ED Arrival to Time of ED Departure   LIPASE - " Abnormal       Result Value    Lipase 10 (*)    COMPREHENSIVE METABOLIC PANEL - Abnormal    Sodium 144      Potassium 4.2      Chloride 117 (*)     Carbon Dioxide (CO2) 21      Anion Gap 6      Urea Nitrogen 25      Creatinine 1.48 (*)     Calcium 8.7      Glucose 164 (*)     Alkaline Phosphatase 156 (*)     AST 41      ALT 47      Protein Total 6.9      Albumin 3.4      Bilirubin Total 0.2      GFR Estimate 39 (*)    ROUTINE UA WITH MICROSCOPIC REFLEX TO CULTURE - Abnormal    Color Urine Light Yellow      Appearance Urine Clear      Glucose Urine Negative      Bilirubin Urine Negative      Ketones Urine Negative      Specific Gravity Urine 1.020      Blood Urine Negative      pH Urine 6.0      Protein Albumin Urine 10  (*)     Urobilinogen Urine Normal      Nitrite Urine Negative      Leukocyte Esterase Urine Negative      RBC Urine 3 (*)     WBC Urine 1      Squamous Epithelials Urine 1     CBC WITH PLATELETS AND DIFFERENTIAL - Abnormal    WBC Count 9.9      RBC Count 4.20      Hemoglobin 12.5      Hematocrit 40.0      MCV 95      MCH 29.8      MCHC 31.3 (*)     RDW 13.2      Platelet Count 386      % Neutrophils 62      % Lymphocytes 24      % Monocytes 11      % Eosinophils 2      % Basophils 1      % Immature Granulocytes 0      NRBCs per 100 WBC 0      Absolute Neutrophils 6.2      Absolute Lymphocytes 2.4      Absolute Monocytes 1.1      Absolute Eosinophils 0.2      Absolute Basophils 0.1      Absolute Immature Granulocytes 0.0      Absolute NRBCs 0.0     ISTAT CREATININE POCT - Abnormal    Creatinine POCT 1.6 (*)     GFR, ESTIMATED POCT 35 (*)    COVID-19 VIRUS (CORONAVIRUS) BY PCR      Emergency Department Course:  Reviewed:  I reviewed nursing notes, vitals, past medical history and Care Everywhere    Assessments:  2128 I obtained history and examined the patient as noted above.   2247 I rechecked the patient and explained findings. We discussed admission to the hospital.    Consults:   I consulted with  Dr. Toledo hospitalist, regarding the patient's history and presentation here in the emergency department who accepted the patient for admission.     Interventions:  2139 Dilaudid 0.5 mg IV  2139 Zofran 4 mg IV  2241 NS 1 L IV    Disposition:  The patient was admitted to the hospital under the care of Dr. Toledo.     Impression & Plan     Medical Decision Making:  Lynn Thompson is a 56 year old female who presents for evaluation of abdominal pain and vomiting.  Clinically this is consistent with bowel obstruction, CT confirms this.  Patient has been resuscitated with IVF..  Will admit to medicine for further cares and bowel rest. No beds at [facility] right now so will admit here and patient prefers to stay here.  She hopefully will not need surgical intervention though if this is needed in future, she would need to have this done at [facility]. There are no signs of surgical emergencies or intraabdominal catastrophes such as volvulus, gut ischemia, perforation, etc. Patient agrees to the plan and all questions answered.     Diagnosis:    ICD-10-CM    1. Small bowel obstruction (H)  K56.609    2. Acute kidney injury (H)  N17.9      Scribe Disclosure:  I, Shawna Lazok, am serving as a scribe at 9:16 PM on 11/23/2021 to document services personally performed by Kennedi Kirk MD based on my observations and the provider's statements to me.        Kennedi Kirk MD  11/24/21 0018

## 2021-11-24 NOTE — PLAN OF CARE
Orientation: Alert and oriented x4  VSS. 96% on room air.   LS: clear, equal bilaterally  GI: Pt has vomited multiple times today and has complained of severe nausea today.  pt given Zofran, compazine, and ativan and nausea symptoms are now manageable.    NG Tube placement today at 1000.  : Adequate urine output.   Activity: Independent. .   Pain: 8/10. Pt stated abdominal pain.  Pain is manageable after dilaudid x2.  Updates/Plan: continue to monitor nausea and control pain.  Monitor NG output.

## 2021-11-24 NOTE — H&P
[facility]  Hospitalist Admission Note  Name: Lynn Thompson    MRN: [id number]  YOB: 1965    Age: 56 year old  Date of admission: 11/23/2021  Primary care provider: Maryana Brooks    Chief Complaint:  Abdominal pain    Assessment and Plan:   Vomiting  Abdominal pain  Small bowel obstruction, recurrent:  Presents with acute onset abdominal pain.  Not passing gas or stool.  Associated with nonbloody emesis.  CT abdomen did show small bowel obstruction.  I suspect that the etiology of her small bowel obstruction is related to adhesions given multiple prior surgeries.  She has a known history of recurrent small bowel obstruction secondary adhesions.  -Medical management with IV Dilaudid for pain and IV Zofran for nausea  -We will hold on placing an NG tube at this point as the patient's symptoms seems to be adequately controlled with antiemetics.    -Low threshold to initiate NG tube if ongoing nausea.  -Consideration for surgery consultation if the patient is failing to improve  -NPO    Acute kidney injury:  Creatinine is elevated to 1.48 on admission.  It appears that her baseline kidney function is normal.  I suspect that she does have an acute kidney injury in the setting with small bowel obstruction and poor p.o. intake today.  -BMP in the morning    Post-pancreatectomy diabetes:  PTA meds have not had a formal med rec.  I will order Lantus 4 units daily and a very low dose sliding scale insulin as it appears that the patient is on very minimal insulin as an outpatient per chart review.  This may need to be uptitrated as needed while inpatient.  Every 4 hour blood glucose checks while the patient remains n.p.o.    History of gastric bypass, pancreatectomy, islet cell autotransplant, splenectomy:  Ultimately the patient has complex intra-abdominal anatomy.  If the patient were to worsen from a small bowel obstruction standpoint, she may benefit from transfer to [place]  [place] if surgery is considered.    Hypothyroidism:  Await formal med rec.     Insomnia:  It appears she takes both modafinil and trazodone. Await formal med rec.     Hypertension:  I do not see that she takes medications as an outpatient. Await formal med rec.    BLU:  Nightly CPAP      DVT Prophylaxis: Low Risk/Ambulatory with no VTE prophylaxis indicated,   Code Status: Full Code  Discharge Dispo: Home pending resolution of SBO  Estimated Disch Date / # of Days until Disch: 2-3 days      History of Present Illness:  Lynn Thompson is a 56 year old female with PMH including diabetes, kidney stones, post total pancreatectomy (2013), islet cell autotransplant (2013), splenectomy (2013), gastric bypass, bowel obstructions, depression, iron deficiency anemia, hypertension, sleep apnea who presents with abdominal pain, vomiting.     The patient reports that she was feeling well up until 7 PM.  At this time she developed sudden onset abdominal pain.  This is associated with vomiting.  The vomit was nonbloody, did not appear like coffee-ground.  The patient reports that since this time she has not had any bowel movements and has not passed any gas.  The pain radiates into the back and is diffuse throughout the abdomen.  She denies any symptoms of fever, chills, chest pain, shortness of breath, diaphoresis, lower extremity edema.    The patient has a complex GI history.  She had a total pancreatectomy and auto islet transplant in 2013.  She also had a splenectomy and a gastric bypass.  She had part of her ileum removed due to Crohn's disease she reports.  Given all these intra-abdominal surgeries the patient has had recurrent small bowel obstructions.  She reports that she has required surgery once for a small bowel obstruction but it was many years ago.  Most of her small bowel obstructions are managed medically.     Past Medical History:  Past Medical History:   Diagnosis Date     Benign paroxysmal positional  vertigo     occ.      Calculus of kidney 05/2005    x1 on L side passed, several stones.  Has been tested for oxalate.     Chronic abdominal pain 2013     Chronic pain      Chronic pancreatitis 2013     Depression     also occ panic spells     Depressive disorder      Diabetes (H) 5/10/2013    Total Pancreatomy with Auto Islets Transplant     Dyspepsia 1999    H. pylori   treated     Headaches     still periodic HA's ;  often 5X/week     Hypertension 2016    Stress related     Iron deficiency anemia secondary to inadequate dietary iron intake 2003    relates to gastric bypass     Post-pancreatectomy diabetes melltius 2013     Sleep apnea     uses splint     Spasm of sphincter of Oddi     surgical [place] [facility] [place]     Thyroid nodule 2016     Vaccination not carried out      Past Surgical History:  Past Surgical History:   Procedure Laterality Date     ABDOMINOPLASTY  2002    Tummy tuck     APPENDECTOMY  1990     BUNIONECTOMY Right 1998     CBD Stent placement  2002    CBD stent; Dr. Presley      SECTION       CHOLECYSTECTOMY       COLONOSCOPY   &     colonoscopy     COLONOSCOPY       COLONOSCOPY N/A 3/31/2021    Procedure: COLONOSCOPY INCOMPLETE Aborted due to incomplete prep  will need to take additional prep and return tomorrow 21;  Surgeon: Ihsan Saenz MD;  Location: [facility]     COMBINED CYSTOSCOPY, RETROGRADES, URETEROSCOPY, LASER HOLMIUM LITHOTRIPSY URETER(S), INSERT STENT Right 2015    Procedure: COMBINED CYSTOSCOPY, RETROGRADES, URETEROSCOPY, LASER HOLMIUM LITHOTRIPSY URETER(S), INSERT STENT;  Surgeon: Kennedi Aldana MD;  Location: [facility]     COMBINED CYSTOSCOPY, RETROGRADES, URETEROSCOPY, LASER HOLMIUM LITHOTRIPSY URETER(S), INSERT STENT Right 2015    Procedure: COMBINED CYSTOSCOPY, RETROGRADES, URETEROSCOPY, LASER HOLMIUM LITHOTRIPSY URETER(S), INSERT STENT;  Surgeon:  Kennedi Aldana MD;  Location: [facility]     COSMETIC SURGERY  6/24/2002    Tummy tuck     CYSTECTOMY OVARIAN BENIGN Right      CYSTOSCOPY, RETROGRADES, INSERT STENT URETER(S), COMBINED  10/02/2012    Procedure: COMBINED CYSTOSCOPY, RETROGRADES, INSERT STENT URETER(S);  COMBINED CYSTOSCOPY,  , INSERT LEFT STENT URETER;  Surgeon: Johny Baez MD;  Location: [facility]     ESOPHAGOSCOPY, GASTROSCOPY, DUODENOSCOPY (EGD), COMBINED N/A 01/24/2018    Procedure: COMBINED ESOPHAGOSCOPY, GASTROSCOPY, DUODENOSCOPY (EGD);  ESOPHAGOSCOPY, GASTROSCOPY, DUODENOSCOPY (EGD)    ;  Surgeon: Tamir Rodgers MD;  Location: [facility]     EXTRACORPOREAL SHOCK WAVE LITHOTRIPSY (ESWL)  10/16/2012    Procedure: EXTRACORPOREAL SHOCK WAVE LITHOTRIPSY (ESWL);  left EXTRACORPOREAL SHOCK WAVE LITHOTRIPSY (ESWL) ;  Surgeon: Johny Baez MD;  Location: [facility]     Gastric bypass NOS       HERNIA REPAIR  02/2015     IRRIGATION AND DEBRIDEMENT HAND, COMBINED Left 10/30/2020    Procedure: Left hand sharp excisional debridement of skin, subcutaneous tissue and fat with a scalpel, 2 x 1 x 1 cm.;  Surgeon: Demian Renteria MD;  Location: [facility]     LAP, LYSIS OF ADHESIONS       LAPAROSCOPIC LYSIS ADHESIONS N/A 02/20/2015    Procedure: LAPAROSCOPIC LYSIS ADHESIONS;  Surgeon: Aaron Early MD;  [facility]     LAPAROSCOPIC LYSIS ADHESIONS N/A 12/29/2015    Procedure: LAPAROSCOPIC LYSIS ADHESIONS;  Surgeon: Aaron Early MD;  [facility]     PANCREATECTOMY, TRANSPLANT AUTO ISLET CELL, COMBINED  05/10/2013    Procedure: COMBINED PANCREATECTOMY, TRANSPLANT AUTO ISLET CELL;  Pancreatectomy, Auto Islet Cell Transplant   hernia repair, jejunostomy tube and liver biopsies with Anesthesia General with block;  Surgeon: Aaron Early MD;  [facility]: [facility]     Partial ileum resection  1992     REPAIR PTOSIS BROW BILATERAL Bilateral 06/09/2020    Procedure: BILATERAL BROW PTOSIS REPAIR;  Surgeon: Denise Alberts MD;  Location:  OR      Surgery for SBO  2015     TONSILLECTOMY, ADENOIDECTOMY, COMBINED  1997     TRANSPLANT  5/10/13    Pancreatic Auto-Islet Transplant     Social History:  Social History     Tobacco Use     Smoking status: Never Smoker     Smokeless tobacco: Never Used   Substance Use Topics     Alcohol use: Not Currently     Alcohol/week: 0.0 standard drinks     Social History     Social History Narrative     Not on file     Family History:  Family History   Problem Relation Age of Onset     Family History Negative Mother      Respiratory Father         COPD;  at 69     Genitourinary Problems Father         kidney stones     Substance Abuse Father      Depression Father      Asthma Father      Heart Disease Paternal Grandfather         M.I.     Coronary Artery Disease Paternal Grandfather      Hyperlipidemia Paternal Grandfather      Genitourinary Problems Brother         multiple brothers with kidney stones     Gastrointestinal Disease Maternal Grandmother         undiagnosed 'gut' issues     Coronary Artery Disease Maternal Grandfather      Hyperlipidemia Maternal Grandfather      Cerebrovascular Disease Paternal Grandmother         At the age of 103     Anxiety Disorder Paternal Grandmother      Osteoporosis Paternal Grandmother      Anxiety Disorder Son      Anxiety Disorder Daughter      Asthma Daughter      Colon Cancer No family hx of      Allergies:  Allergies   Allergen Reactions     Corticosteroids Other (See Comments)     All oral, IV and injectable steroids are contraindicated (unless in life threatening situations) in Islet Auto transplant recipients. They can cause irreversible loss of islet cell function. Please contact patient's transplant care coordinator, Erlinda Multani RN BSN at [phone]/pager: [phone] and endocrinologist prior to administration.       Povidone Iodine Hives     Causes skin to blister     Naproxen      Other reaction(s): Abdominal Pain  Pt allergic to Naprosyn     Nsaids      naprosyn  "= GI upset     Povidone Iodine      blisters     Sulfasalazine Nausea and Nausea and Vomiting     Medications:  (Not in a hospital admission)    Review of Systems:  A Comprehensive greater than 10 system review of systems was carried out.  Pertinent positives and negatives are noted above.  Otherwise negative for contributory information.     Physical Exam:  Blood pressure (!) 153/116, pulse 74, temperature 98.3  F (36.8  C), temperature source Temporal, resp. rate 18, height 1.626 m (5' 4\"), weight 51.3 kg (113 lb), last menstrual period 12/19/2013, SpO2 98 %, not currently breastfeeding.  Wt Readings from Last 1 Encounters:   11/23/21 51.3 kg (113 lb)     Exam:  General: Alert, awake, no acute distress. Uncomfortable appearing.  HEENT: NC/AT, eyes anicteric, external occular movements intact, face symmetric.  Dentition WNL, MM moist.  Cardiac: RRR, S1, S2.  No murmurs appreciated.  Pulmonary: Normal chest rise, normal work of breathing.  Lungs CTA BL  Abdomen: soft, tenderness to palpation most significant in the right abdominal fields. Otherwise, there is mild TTP throughout. Bowel Sounds Present.  No guarding.  Extremities: no deformities.  Warm, well perfused.  Skin: no rashes or lesions noted.  Warm and Dry.  Neuro: No focal deficits noted.  Speech clear.  Coordination and strength grossly normal.  Psych: Appropriate affect.    Data:  EKG:  None  Imaging:  Recent Results (from the past 48 hour(s))   Abd/pelvis CT no contrast - Stone Protocol    Narrative    EXAM: CT ABDOMEN PELVIS W/O CONTRAST  LOCATION: [facility]  DATE/TIME: 11/23/2021 10:13 PM    INDICATION: Abdominal distension  COMPARISON: 03/01/2021  TECHNIQUE: CT scan of the abdomen and pelvis was performed without IV contrast. Multiplanar reformats were obtained. Dose reduction techniques were used.  CONTRAST: None.    FINDINGS:   LOWER CHEST: Normal.    HEPATOBILIARY: Cholecystectomy and pneumobilia similar to previous " exam.    PANCREAS: Postoperative changes appear to relate to prior pancreatectomy, no definite residual gland evident on this noncontrast study.    SPLEEN: Prior splenectomy.    ADRENAL GLANDS: Normal.    KIDNEYS/BLADDER: Left kidney contains a 3 mm lower pole stone. Right kidney contains a 1 mm lower pole stone. No hydronephrosis or renal mass evident.    BOWEL: Previous gastric bypass surgery. There is also been surgery near ileocolic junction. Resolution of the inflammatory wall thickening previously involving right hemicolon.  There is new dilatation of proximal small bowel loops including region of fecalization of internal contents at mid small bowel. Distal small bowel normal in caliber. Findings would be consistent with partial mid small bowel obstruction.    LYMPH NODES: No definite lymphadenopathy.    VASCULATURE: Unremarkable.    PELVIC ORGANS: No adnexal lesions. Wisp of free pelvic fluid.    MUSCULOSKELETAL: Normal.      Impression    IMPRESSION:   1.  Mid small bowel obstruction, etiology uncertain.  2.  Resolution of previous colitis.  3.  Tiny renal calculi, no hydronephrosis.         Labs:  Recent Labs   Lab 11/23/21  2136   WBC 9.9   HGB 12.5   HCT 40.0   MCV 95             Lab Results   Component Value Date     11/23/2021     10/07/2021     09/03/2021     05/10/2021     03/01/2021     02/28/2021    Lab Results   Component Value Date    CHLORIDE 117 11/23/2021    CHLORIDE 111 10/07/2021    CHLORIDE 111 09/03/2021    CHLORIDE 108 05/10/2021    CHLORIDE 111 03/01/2021    CHLORIDE 107 02/28/2021    Lab Results   Component Value Date    BUN 25 11/23/2021    BUN 19 10/07/2021    BUN 18 09/03/2021    BUN 16 05/10/2021    BUN 17 03/01/2021    BUN 27 02/28/2021      Lab Results   Component Value Date    POTASSIUM 4.2 11/23/2021    POTASSIUM 4.3 10/07/2021    POTASSIUM 4.0 09/03/2021    POTASSIUM 3.6 05/10/2021    POTASSIUM 3.5 03/01/2021    POTASSIUM 3.5 02/28/2021     Lab Results   Component Value Date    CO2 21 11/23/2021    CO2 29 10/07/2021    CO2 27 09/03/2021    CO2 28 05/10/2021    CO2 26 03/01/2021    CO2 28 02/28/2021    Lab Results   Component Value Date    CR 1.6 11/23/2021    CR 1.48 11/23/2021    CR 0.80 10/07/2021    CR 0.81 09/03/2021    CR 0.81 05/10/2021    CR 0.85 03/01/2021    CR 0.95 02/28/2021            DO Rose Dominguezist  [facility]

## 2021-11-24 NOTE — ED NOTES
"[facility] [place]  ED Nurse Handoff Report    Lynn Thompson is a 56 year old female   ED Chief complaint: Flank Pain and Abdominal Pain  . ED Diagnosis:   Final diagnoses:   Small bowel obstruction (H)   Acute kidney injury (H)     Allergies:   Allergies   Allergen Reactions     Corticosteroids Other (See Comments)     All oral, IV and injectable steroids are contraindicated (unless in life threatening situations) in Islet Auto transplant recipients. They can cause irreversible loss of islet cell function. Please contact patient's transplant care coordinator, Erlinda Multani RN BSN at [phone]/pager: [phone] and endocrinologist prior to administration.       Povidone Iodine Hives     Causes skin to blister     Naproxen      Other reaction(s): Abdominal Pain  Pt allergic to Naprosyn     Nsaids      naprosyn = GI upset     Povidone Iodine      blisters     Sulfasalazine Nausea and Nausea and Vomiting       Code Status: Full Code  Activity level - Baseline/Home:  Independent. Activity Level - Current:   Stand by Assist. Lift room needed: No. Bariatric: No   Needed: No   Isolation: No. Infection: Not Applicable.     Vital Signs:   Vitals:    11/23/21 2102 11/24/21 0100 11/24/21 0147   BP: (!) 153/116     Pulse: 74 58    Resp: 18     Temp: 98.3  F (36.8  C)     TempSrc: Temporal     SpO2: 98% 94% 95%   Weight: 51.3 kg (113 lb)     Height: 1.626 m (5' 4\")         Cardiac Rhythm:  ,      Pain level:    Patient confused: No. Patient Falls Risk: Yes.   Elimination Status: Has voided   Patient Report - Initial Complaint: Abd pain/nausea. Focused Assessment: Here for right flank pain and abdominal bloating started couple hours ago associated with n/v. Stated symptoms feel similar to her bowel obstruction in the past. Took tylenol about 1 hour ago but not helping with pain. ABCs intact.    Tests Performed:   Labs Ordered and Resulted from Time of ED Arrival to Time of ED Departure   LIPASE - " Abnormal       Result Value    Lipase 10 (*)    COMPREHENSIVE METABOLIC PANEL - Abnormal    Sodium 144      Potassium 4.2      Chloride 117 (*)     Carbon Dioxide (CO2) 21      Anion Gap 6      Urea Nitrogen 25      Creatinine 1.48 (*)     Calcium 8.7      Glucose 164 (*)     Alkaline Phosphatase 156 (*)     AST 41      ALT 47      Protein Total 6.9      Albumin 3.4      Bilirubin Total 0.2      GFR Estimate 39 (*)    ROUTINE UA WITH MICROSCOPIC REFLEX TO CULTURE - Abnormal    Color Urine Light Yellow      Appearance Urine Clear      Glucose Urine Negative      Bilirubin Urine Negative      Ketones Urine Negative      Specific Gravity Urine 1.020      Blood Urine Negative      pH Urine 6.0      Protein Albumin Urine 10  (*)     Urobilinogen Urine Normal      Nitrite Urine Negative      Leukocyte Esterase Urine Negative      RBC Urine 3 (*)     WBC Urine 1      Squamous Epithelials Urine 1     CBC WITH PLATELETS AND DIFFERENTIAL - Abnormal    WBC Count 9.9      RBC Count 4.20      Hemoglobin 12.5      Hematocrit 40.0      MCV 95      MCH 29.8      MCHC 31.3 (*)     RDW 13.2      Platelet Count 386      % Neutrophils 62      % Lymphocytes 24      % Monocytes 11      % Eosinophils 2      % Basophils 1      % Immature Granulocytes 0      NRBCs per 100 WBC 0      Absolute Neutrophils 6.2      Absolute Lymphocytes 2.4      Absolute Monocytes 1.1      Absolute Eosinophils 0.2      Absolute Basophils 0.1      Absolute Immature Granulocytes 0.0      Absolute NRBCs 0.0     ISTAT CREATININE POCT - Abnormal    Creatinine POCT 1.6 (*)     GFR, ESTIMATED POCT 35 (*)    COVID-19 VIRUS (CORONAVIRUS) BY PCR - Normal    SARS CoV2 PCR Negative     LACTIC ACID WHOLE BLOOD - Normal    Lactic Acid 0.9     GLUCOSE MONITOR NURSING POCT   GLUCOSE MONITOR NURSING POCT     Abd/pelvis CT no contrast - Stone Protocol   Final Result   IMPRESSION:    1.  Mid small bowel obstruction, etiology uncertain.   2.  Resolution of previous colitis.   3.   Tiny renal calculi, no hydronephrosis.           . Abnormal Results: creat, CT.   Treatments provided: See MAR  Family Comments: pt has contacted  OBS brochure/video discussed/provided to patient:  Yes  ED Medications:   Medications   lidocaine 1 % 0.1-1 mL (has no administration in time range)   lidocaine (LMX4) cream (has no administration in time range)   sodium chloride (PF) 0.9% PF flush 3 mL (has no administration in time range)   sodium chloride (PF) 0.9% PF flush 3 mL (has no administration in time range)   melatonin tablet 1 mg (has no administration in time range)   sodium chloride 0.9% infusion (has no administration in time range)   ondansetron (ZOFRAN-ODT) ODT tab 4 mg (has no administration in time range)     Or   ondansetron (ZOFRAN) injection 4 mg (has no administration in time range)   prochlorperazine (COMPAZINE) injection 10 mg (10 mg Intravenous Given 11/24/21 0143)     Or   prochlorperazine (COMPAZINE) tablet 10 mg ( Oral See Alternative 11/24/21 0143)     Or   prochlorperazine (COMPAZINE) suppository 25 mg ( Rectal See Alternative 11/24/21 0143)   glucose gel 15-30 g (has no administration in time range)     Or   dextrose 50 % injection 25-50 mL (has no administration in time range)     Or   glucagon injection 1 mg (has no administration in time range)   insulin glargine (LANTUS PEN) injection 4 Units (has no administration in time range)   insulin aspart (NovoPen ECHO/NovoLOG) cartridge (has no administration in time range)   insulin aspart (NovoPen ECHO/NovoLOG) cartridge (has no administration in time range)   ondansetron (ZOFRAN) injection 4 mg (4 mg Intravenous Given 11/24/21 0003)   HYDROmorphone (PF) (DILAUDID) injection 0.5 mg (0.5 mg Intravenous Given 11/23/21 2300)   0.9% sodium chloride BOLUS (0 mLs Intravenous Stopped 11/23/21 6535)     Drips infusing:  No  For the majority of the shift, the patient's behavior Green. Interventions performed were none.    Sepsis treatment initiated:  No     Patient tested for COVID 19 prior to admission: YES- negative    ED Nurse Name/Phone Number: Eliza Donis RN,   1:48 AM    RECEIVING UNIT ED HANDOFF REVIEW    Above ED Nurse Handoff Report was reviewed: Yes  Reviewed by: Tessa Dimas RN on November 24, 2021 at 1:53 AM

## 2021-11-24 NOTE — ED TRIAGE NOTES
Here for right flank pain and abdominal bloating started couple hours ago associated with n/v. Stated symptoms feel similar to her bowel obstruction in the past. Took tylenol about 1 hour ago but not helping with pain. ABCs intact.

## 2021-11-24 NOTE — PHARMACY-ADMISSION MEDICATION HISTORY
Admission medication history interview status for this patient is complete. See [facility] admission navigator for allergy information, prior to admission medications and immunization status.     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  N   (Y/N)     For patients on insulin therapy:   Do you use sliding scale insulin based on blood sugars? Yes  What is your pre-meal insulin coverage?  varies with size of meals  Do you typically eat three meals a day? Yes  How many times do you check your blood glucose per day? Has Dexcom G6  How many episodes of hypoglycemia do you typically have per month? Daily, See above  Do you have a Continuous Glucose Monitor (CGM)?  Yes    Prior to Admission medications    Medication Sig Last Dose Taking? Auth Provider   amylase-lipase-protease (VIOKACE) 27521-35717 units TABS tablet Take 4-5 with meals and 2 with snacks  Patient taking differently: Take 4-5 with big meal  2-3 with small meals and snacks 11/23/2021 at Unknown time Yes Adriana Robles MD   calcium carbonate 600 mg-vitamin D 400 units (CALTRATE) 600-400 MG-UNIT per tablet Take 1 tablet by mouth daily 11/23/2021 at Unknown time Yes Unknown, Entered By History   DULoxetine (CYMBALTA) 20 MG capsule Take 20 mg by mouth daily 11/23/2021 at Unknown time Yes Unknown, Entered By History   escitalopram (LEXAPRO) 20 MG tablet Take 20 mg by mouth daily 11/23/2021 at Unknown time Yes Unknown, Entered By History   estradiol (ESTRACE) 0.1 MG/GM  vaginal cream PLACE 2 GRAMS VAGINALLY 2 TIMES WEEKLY  Patient taking differently: PLACE 2 GRAMS VAGINALLY 2 TIMES WEEKLY  Thursdays and Sundays Past Week at Unknown time Yes Kavitha Spencer DO   famotidine (PEPCID) 40 MG tablet Take 1 tablet (40 mg) by mouth 2 times daily as needed for heartburn 11/23/2021 at Unknown time Yes Maryana Brooks MD   FIASP PENFILL 100 UNIT/ML SOCT Inject 0.5-4 Units Subcutaneous 4 times daily (with meals and nightly)  Patient taking differently: Inject 0.5-4 Units Subcutaneous 3 times daily  11/23/2021 at Unknown time Yes Adriana Robles MD   gabapentin (NEURONTIN) 300 MG capsule Take 300 mg by mouth nightly as needed 11/23/2021 at Unknown time Yes Unknown, Entered By History   Glucagon (GVOKE HYPOPEN 1-PACK) 1 MG/0.2ML SOAJ Inject 1 mg Subcutaneous once as needed (for hypoglycemia with loss of consciousness)  Yes Adriana Robles MD   glucose 40 % GEL Take 15-30 g by mouth every 15 minutes as needed.  Yes Suzi Short APRN CNP   hydrOXYzine (ATARAX) 25 MG tablet TAKE 1-2 TABLETS BY MOUTH 2 TIMES DAILY AS NEEDED FOR ANXIETY Past Week at Unknown time Yes Reported, Patient   insulin glargine (LANTUS PEN) 100 UNIT/ML pen Inject 6 units daily  Patient taking differently: Inject 5 Units Subcutaneous At Bedtime Inject 6 units daily 11/23/2021 at Unknown time Yes Adriana Robles MD   levothyroxine (SYNTHROID/LEVOTHROID) 100 MCG tablet Take 1 tablet (100 mcg) by mouth daily 11/23/2021 at Unknown time Yes aMryana Brooks MD   loratadine (CLARITIN) 10 MG tablet Take 10 mg by mouth daily as needed  11/23/2021 at Unknown time Yes Unknown, Entered By History   metroNIDAZOLE (FLAGYL) 500 MG tablet Take 1 tablet (500 mg) by mouth every 6 hours At 8:00 am, 2:00 pm, 8:00 pm the day prior to your surgery with neomycin and zofran.  Yes Uriah Sheridan MD   modafinil (PROVIGIL) 200 MG tablet Take 400 mg by mouth daily 11/23/2021 at Unknown time Yes Unknown, Entered  "By History   neomycin (MYCIFRADIN) 500 MG tablet Take 2 tablets (1,000 mg) by mouth every 6 hours At 8:00 am, 2:00 pm, 8:00 pm the day prior to your surgery with flagyl and zofran.  Yes Uriah Sheridan MD   omeprazole (PRILOSEC) 40 MG DR capsule Take 1 capsule (40 mg) by mouth 2 times daily Take 30-60 minutes before a meal. 11/23/2021 at Unknown time Yes Maryana Brooks MD   polyethylene glycol (MIRALAX) 17 g packet Take 238 g by mouth See Admin Instructions Starting at 4 pm night prior to surgery. Refer to \"Getting Ready for Surgery\" instructions.  Yes Uriah Sheridan MD   traZODone (DESYREL) 50 MG tablet Take 50 mg by mouth nightly as needed for sleep Past Week at Unknown time Yes Unknown, Entered By History   Continuous Blood Gluc  (DEXCOM G6 ) MODESTA Use as directed to check blood glucose levels   Adriana Robles MD   Continuous Blood Gluc Sensor (DEXCOM G6 SENSOR) MISC Change every 10 days   Adriana Robles MD   Continuous Blood Gluc Transmit (DEXCOM G6 TRANSMITTER) MISC Change every 3 months   Adriana Robles MD   Nutritional Supplements (BOOST HIGH PROTEIN) LIQD After above baseline labs are drawn, give: 6 mL/kg to maximum of 360 mL; the beverage is to be consumed within 5 minutes.   Adriana Robles MD   ondansetron (ZOFRAN) 4 MG tablet Take 1 tablet (4 mg) by mouth every 6 hours At 8:00 am, 2:00 pm, 8:00 pm the day prior to your surgery with neomycin and flagyl.   Uriha Sheridan MD   STATIN NOT PRESCRIBED (INTENTIONAL) Please choose reason not prescribed from choices below.   Maryana Brooks MD           "

## 2021-11-24 NOTE — PROGRESS NOTES
[facility]  Hospitalist Progress Note  Temi Baires MD 11/24/21    Reason for Stay (Diagnosis): SBO         Assessment and Plan:      Summary of Stay: Lynn Thompson is a 56 year old female with extensive medical and surgical history with remote ileocolic resection(1982), Quinn-en-Y gastric bypass (2001), abdominal plasty (2005), total pancreatectomy with auto islet transplantation, splenectomy, J-tube (2013), laparoscopic lysis of adhesions for SBO February 2015, laparotomy with lysis of adhesions for SBO December 2015 (most recent surgeries all done at the [facility]), IDDM and BLU (on CPAP) who was admitted on 11/23/21 with abdominal pain and was found to have SBO with KATLYN.    Problem List/Assessment and Plan:     SBO: Presents with acute onset abdominal pain.  Not passing gas or stool.  CT abdomen did show small bowel obstruction.  I suspect that the etiology of her small bowel obstruction is related to adhesions given multiple prior surgeries.  She has a known history of recurrent small bowel obstruction secondary adhesions (last 2015).  She had several episodes of emesis overnight, NGT will be placed today.  -IVF, IV narcotics, antiemetics PRN  -NGT to be placed today  -Surgery consulted, appreciate recommendations  -If patient requires surgery she would need to be transferred to [facility] given her high surgical complexity  -NPO     Acute kidney injury - Resolved: Creatinine is elevated to 1.48 on admission. Pre renal, today normalized.     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 PTA has lows daily.  - Lantus 4 units QAM  - Very low insulin (patient uses her own Fiasp), appreciate pharmacy assistance     History of gastric bypass, pancreatectomy, islet cell autotransplant, splenectomy:  Ultimately the patient has complex intra-abdominal anatomy.  If the patient were to worsen from a small bowel  "obstruction standpoint, she would need transfer to [facility] if surgery is considered.     Hypothyroidism: Hold Synthroid until taking PO.     Insomnia: She takes both modafinil and trazodone. Holding given NPO status.    Anxiety: Hold PTA Cymbalta and Lexapro until diet started.     Hypertension: Not on any medications, likely worsened by acute illness.  PRN Hydralazine.    GERD: IV Protonix while NPO.     BLU: Nightly CPAP    Diet: NPO for Medical/Clinical Reasons Except for: Meds    DVT Prophylaxis: Pneumatic Compression Devices in case she will require surgical procedure  Gonzalez Catheter: Not present  Code Status: Full Code      Disposition Plan   Expected discharge: 2-4 days  recommended to prior living arrangement once SBP resolved.  Entered: Temi Baires MD 11/24/2021, 9:24 AM       The patient's care was discussed with the Bedside Nurse and Patient.    Hospitalist Service  [facility]          Interval History (Subjective):      Patient was seen with her bedside nurse this morning.  She states that she had a rough night because she had so much nausea and a few episodes of emesis.  Last episode of emesis was earlier this morning.  Continues to have abdominal pain, currently rating 7 out of 10.  She does feel that her abdomen is bloated.  Denies chest pain or shortness of breath.  She has not had flatus.  Discussed possible NGT placement and if needed she would be agreeable to this.                  Physical Exam:      Last Vital Signs:  BP (!) 150/74   Pulse 60   Temp 98.3  F (36.8  C) (Oral)   Resp 16   Ht 1.626 m (5' 4\")   Wt 52.8 kg (116 lb 4.8 oz)   LMP 12/19/2013   SpO2 94%   BMI 19.96 kg/m      General: Alert, awake, appears uncomfortable  HEENT: Normocephalic and atraumatic, eyes anicteric and without scleral injection, EOMI, face symmetric, MMM.  Cardiac: RRR, normal S1, S2. No m/g/r, no LE edema.  Pulmonary: Normal chest rise, normal work of breathing.  " Lungs CTAB without crackles or wheezing.  Abdomen: soft, diffusely tender, non-distended.  Very hypoactive bowel sounds, no guarding or rebound tenderness.  Extremities: no deformities.  Warm, well perfused.  Skin: no rashes or lesions.  Warm and Dry.  Neuro: No focal deficits.  Speech clear.  Moving all extremities in bed  Psych: Alert and oriented x3. Appropriate affect.         Medications:      All current medications were reviewed with changes reflected in problem list.         Data:      All new lab and imaging data was reviewed.   Labs:  Recent Labs   Lab 11/24/21  0825 11/24/21  0624 11/24/21 0224 11/23/21 2141 11/23/21  2136   NA  --  141  --   --  144   POTASSIUM  --  3.8  --   --  4.2   CHLORIDE  --  109  --   --  117*   CO2  --  25  --   --  21   ANIONGAP  --  7  --   --  6   * 253* 189*  --  164*   BUN  --  24  --   --  25   CR  --  0.90  --  1.6* 1.48*   GFRESTIMATED  --  71  --  35* 39*   VALARIE  --  8.4*  --   --  8.7     Recent Labs   Lab 11/24/21  0624 11/23/21  2136   WBC 11.2* 9.9   HGB 11.6* 12.5   HCT 37.8 40.0   MCV 96 95    386      Imaging:   Recent Results (from the past 24 hour(s))   Abd/pelvis CT no contrast - Stone Protocol    Narrative    EXAM: CT ABDOMEN PELVIS W/O CONTRAST  LOCATION: [facility]  DATE/TIME: 11/23/2021 10:13 PM    INDICATION: Abdominal distension  COMPARISON: 03/01/2021  TECHNIQUE: CT scan of the abdomen and pelvis was performed without IV contrast. Multiplanar reformats were obtained. Dose reduction techniques were used.  CONTRAST: None.    FINDINGS:   LOWER CHEST: Normal.    HEPATOBILIARY: Cholecystectomy and pneumobilia similar to previous exam.    PANCREAS: Postoperative changes appear to relate to prior pancreatectomy, no definite residual gland evident on this noncontrast study.    SPLEEN: Prior splenectomy.    ADRENAL GLANDS: Normal.    KIDNEYS/BLADDER: Left kidney contains a 3 mm lower pole stone. Right kidney contains a 1 mm  lower pole stone. No hydronephrosis or renal mass evident.    BOWEL: Previous gastric bypass surgery. There is also been surgery near ileocolic junction. Resolution of the inflammatory wall thickening previously involving right hemicolon.  There is new dilatation of proximal small bowel loops including region of fecalization of internal contents at mid small bowel. Distal small bowel normal in caliber. Findings would be consistent with partial mid small bowel obstruction.    LYMPH NODES: No definite lymphadenopathy.    VASCULATURE: Unremarkable.    PELVIC ORGANS: No adnexal lesions. Wisp of free pelvic fluid.    MUSCULOSKELETAL: Normal.      Impression    IMPRESSION:   1.  Mid small bowel obstruction, etiology uncertain.  2.  Resolution of previous colitis.  3.  Tiny renal calculi, no hydronephrosis.         Temi Baires MD

## 2021-11-24 NOTE — CONSULTS
General Surgery Consultation    Lynn Thompson MRN# [id number]   Age: 56 year old YOB: 1965     Date of Admission:  11/23/2021    Reason for consult:            Small bowel obstruction       Requesting physician:            Temi Baires MD                Assessment and Plan:   Assessment:   Lynn Thompson is a 56 year old female with extensive medical and surgical history, most notably remote ileocolic resection in 1982, Quinn-en-Y gastric bypass in 2001, abdominoplasty in 2005, total pancreatectomy with auto islet transplantation, splenectomy, J-tube in 2013, laparoscopic lysis of adhesions for SBO February 2015, laparotomy with lysis of adhesions for SBO December 2015 (most recent surgeries all done at the [facility] by Dr. Early), admitted with a small bowel obstruction.  She states this is her first bowel obstruction since last surgery in 2015.  She is scheduled to undergo an open rectopexy, sacral colpopexy for prolapse in a few months.      Plan:   Continue conservative management with bowel rest, npo  She has been vomiting since admission, will place NG tube for decompression today -discussed with nursing, due to small gastric pouch if meeting any resistance do not advance.  Will consider Gastrografin study tomorrow if not opening up.  Could also consider repeating her CT with IV contrast and oral contrast because her initial CT scan was done without IV contrast (due to KATLYN now resolved) as it is hard to delineate her bowel loops without contrast.  If surgical intervention is needed would attempt to transfer to [facility] to extremely complex past surgical history.               Chief Complaint:   Small bowel obstruction    History is obtained from the patient         History of Present Illness:   Lynn Thompson is a 56 year old  female who presents with abdominal pain diffusely for the past 1 day.  Prior to yesterday she was eating normally and having normal bowel  movements.   The pain is constant and cramping.  Associated symptoms include with nausea and vomiting, last was this morning, she states the vomit appears brown..  Last bowel movement was yesterday and was normal.  Last flatus was yesterday.  At baseline BMs are regular. She has had constipation in the past and actually has rectal prolapse and is scheduled to undergo rectopexy in a couple months.   Extensive abdominal surgical history reviewed with patient most notably remote ileocolic resection in 1982 (thought to have Crohn's disease at that time however has not had any problems with that since has not been on Crohn's medications since.).  Underwent Quinn-en-Y gastric bypass in 2001, abdominal plasty in 2005, total pancreatectomy with auto islet transplantation, splenectomy, J-tube in 2013, laparoscopic lysis of adhesions for SBO February 2015, laparotomy with lysis of adhesions for SBO December 2015 (most recent surgeries all done [facility] by Dr. Early),          Past Medical History:     Past Medical History:   Diagnosis Date     Benign paroxysmal positional vertigo     occ.      Calculus of kidney 05/2005    x1 on L side passed, several stones.  Has been tested for oxalate.     Chronic abdominal pain 07/17/2013     Chronic pain      Chronic pancreatitis 07/17/2013     Depression     also occ panic spells     Depressive disorder      Diabetes (H) 5/10/2013    Total Pancreatomy with Auto Islets Transplant     Dyspepsia 06/1999    H. 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 Presley      SECTION       CHOLECYSTECTOMY       COLONOSCOPY   &     colonoscopy     COLONOSCOPY       COLONOSCOPY N/A 3/31/2021    Procedure: COLONOSCOPY INCOMPLETE Aborted due to incomplete prep  will need to take additional prep and return tomorrow 21;  Surgeon: Ihsan Saenz MD;  Location: [facility]     COMBINED CYSTOSCOPY, RETROGRADES, URETEROSCOPY, LASER HOLMIUM LITHOTRIPSY URETER(S), INSERT STENT Right 2015    Procedure: COMBINED CYSTOSCOPY, RETROGRADES, URETEROSCOPY, LASER HOLMIUM LITHOTRIPSY URETER(S), INSERT STENT;  Surgeon: Kennedi Aldana MD;  Location: [facility] OR     COMBINED CYSTOSCOPY, RETROGRADES, URETEROSCOPY, LASER HOLMIUM LITHOTRIPSY URETER(S), INSERT STENT Right 2015    Procedure: COMBINED CYSTOSCOPY, RETROGRADES, URETEROSCOPY, LASER HOLMIUM LITHOTRIPSY URETER(S), INSERT STENT;  Surgeon: Kennedi Aldana MD;  Location: [facility] OR     COSMETIC SURGERY  2002    Tummy tuck     CYSTECTOMY OVARIAN BENIGN Right      CYSTOSCOPY, RETROGRADES, INSERT STENT URETER(S), COMBINED  10/02/2012    Procedure: COMBINED CYSTOSCOPY, RETROGRADES, INSERT STENT URETER(S);  COMBINED CYSTOSCOPY,  , INSERT LEFT STENT URETER;  Surgeon: Johny Baez MD;  Location:  OR     ESOPHAGOSCOPY, GASTROSCOPY, DUODENOSCOPY (EGD), COMBINED N/A 2018    Procedure: COMBINED ESOPHAGOSCOPY, GASTROSCOPY, DUODENOSCOPY (EGD);  ESOPHAGOSCOPY, GASTROSCOPY, DUODENOSCOPY (EGD)    ;  Surgeon: Tamir Rodgers MD;  Location:  GI     EXTRACORPOREAL SHOCK WAVE LITHOTRIPSY (ESWL)  10/16/2012    Procedure: EXTRACORPOREAL SHOCK WAVE LITHOTRIPSY (ESWL);  left EXTRACORPOREAL SHOCK WAVE LITHOTRIPSY (ESWL) ;  Surgeon: Johny Baez MD;  Location:   OR     Gastric bypass NOS       HERNIA REPAIR  2015     IRRIGATION AND DEBRIDEMENT HAND, COMBINED Left 10/30/2020    Procedure: Left hand sharp excisional debridement of skin, subcutaneous tissue and fat with a scalpel, 2 x 1 x 1 cm.;  Surgeon: Demian Renteria MD;  Location: [facility] OR     LAP, LYSIS OF ADHESIONS       LAPAROSCOPIC LYSIS ADHESIONS N/A 2015    Procedure: LAPAROSCOPIC LYSIS ADHESIONS;  Surgeon: Aaron Early MD;  [facility]     LAPAROSCOPIC LYSIS ADHESIONS N/A 2015    Procedure: LAPAROSCOPIC LYSIS ADHESIONS;  Surgeon: Aaron Early MD;  [facility]     PANCREATECTOMY, TRANSPLANT AUTO ISLET CELL, COMBINED  05/10/2013    Procedure: COMBINED PANCREATECTOMY, TRANSPLANT AUTO ISLET CELL;  Pancreatectomy, Auto Islet Cell Transplant   hernia repair, jejunostomy tube and liver biopsies with Anesthesia General with block;  Surgeon: Aaron Early MD;  Location: [facility]     Partial ileum resection       REPAIR PTOSIS BROW BILATERAL Bilateral 2020    Procedure: BILATERAL BROW PTOSIS REPAIR;  Surgeon: Denise Alberts MD;  Location:  OR     Surgery for SBO       TONSILLECTOMY, ADENOIDECTOMY, COMBINED  1997     TRANSPLANT  5/10/13    Pancreatic Auto-Islet Transplant             Social History:     Social History     Tobacco Use     Smoking status: Never Smoker     Smokeless tobacco: Never Used   Substance Use Topics     Alcohol use: Not Currently     Alcohol/week: 0.0 standard drinks             Family History:     Family History   Problem Relation Age of Onset     Family History Negative Mother      Respiratory Father         COPD;  at 69     Genitourinary Problems Father         kidney stones     Substance Abuse Father      Depression Father      Asthma Father      Heart Disease Paternal Grandfather         M.I.     Coronary Artery Disease Paternal Grandfather      Hyperlipidemia Paternal Grandfather      Genitourinary Problems Brother         multiple  brothers with kidney stones     Gastrointestinal Disease Maternal Grandmother         undiagnosed 'gut' issues     Coronary Artery Disease Maternal Grandfather      Hyperlipidemia Maternal Grandfather      Cerebrovascular Disease Paternal Grandmother         At the age of 103     Anxiety Disorder Paternal Grandmother      Osteoporosis Paternal Grandmother      Anxiety Disorder Son      Anxiety Disorder Daughter      Asthma Daughter      Colon Cancer No family hx of             Allergies:     Allergies   Allergen Reactions     Corticosteroids Other (See Comments)     All oral, IV and injectable steroids are contraindicated (unless in life threatening situations) in Islet Auto transplant recipients. They can cause irreversible loss of islet cell function. Please contact patient's transplant care coordinator, Erlinda Multani RN BSN at [phone]/pager: [phone] and endocrinologist prior to administration.       Povidone Iodine Hives     Causes skin to blister     Naproxen      Other reaction(s): Abdominal Pain  Pt allergic to Naprosyn     Nsaids      naprosyn = GI upset     Povidone Iodine      blisters     Sulfasalazine Nausea and Nausea and Vomiting             Medications:   No current facility-administered medications on file prior to encounter.  amylase-lipase-protease (VIOKACE) 88742-34773 units TABS tablet, Take 4-5 with meals and 2 with snacks (Patient taking differently: Take 4-5 with big meal  2-3 with small meals and snacks)  calcium carbonate 600 mg-vitamin D 400 units (CALTRATE) 600-400 MG-UNIT per tablet, Take 1 tablet by mouth daily  DULoxetine (CYMBALTA) 20 MG capsule, Take 20 mg by mouth daily  escitalopram (LEXAPRO) 20 MG tablet, Take 20 mg by mouth daily  estradiol (ESTRACE) 0.1 MG/GM vaginal cream, PLACE 2 GRAMS VAGINALLY 2 TIMES WEEKLY (Patient taking differently: PLACE 2 GRAMS VAGINALLY 2 TIMES WEEKLY  Thursdays and Sundays)  famotidine (PEPCID) 40 MG tablet, Take 1 tablet (40 mg) by mouth  "2 times daily as needed for heartburn  FIASP PENFILL 100 UNIT/ML SOCT, Inject 0.5-4 Units Subcutaneous 4 times daily (with meals and nightly) (Patient taking differently: Inject 0.5-4 Units Subcutaneous 3 times daily )  gabapentin (NEURONTIN) 300 MG capsule, Take 300 mg by mouth nightly as needed  Glucagon (GVOKE HYPOPEN 1-PACK) 1 MG/0.2ML SOAJ, Inject 1 mg Subcutaneous once as needed (for hypoglycemia with loss of consciousness)  glucose 40 % GEL, Take 15-30 g by mouth every 15 minutes as needed.  hydrOXYzine (ATARAX) 25 MG tablet, TAKE 1-2 TABLETS BY MOUTH 2 TIMES DAILY AS NEEDED FOR ANXIETY  insulin glargine (LANTUS PEN) 100 UNIT/ML pen, Inject 6 units daily (Patient taking differently: Inject 5 Units Subcutaneous At Bedtime Inject 6 units daily)  levothyroxine (SYNTHROID/LEVOTHROID) 100 MCG tablet, Take 1 tablet (100 mcg) by mouth daily  loratadine (CLARITIN) 10 MG tablet, Take 10 mg by mouth daily as needed   metroNIDAZOLE (FLAGYL) 500 MG tablet, Take 1 tablet (500 mg) by mouth every 6 hours At 8:00 am, 2:00 pm, 8:00 pm the day prior to your surgery with neomycin and zofran.  modafinil (PROVIGIL) 200 MG tablet, Take 400 mg by mouth daily  neomycin (MYCIFRADIN) 500 MG tablet, Take 2 tablets (1,000 mg) by mouth every 6 hours At 8:00 am, 2:00 pm, 8:00 pm the day prior to your surgery with flagyl and zofran.  omeprazole (PRILOSEC) 40 MG DR capsule, Take 1 capsule (40 mg) by mouth 2 times daily Take 30-60 minutes before a meal.  polyethylene glycol (MIRALAX) 17 g packet, Take 238 g by mouth See Admin Instructions Starting at 4 pm night prior to surgery. Refer to \"Getting Ready for Surgery\" instructions.  traZODone (DESYREL) 50 MG tablet, Take 50 mg by mouth nightly as needed for sleep  Continuous Blood Gluc  (DEXCOM G6 ) MODESTA, Use as directed to check blood glucose levels  Continuous Blood Gluc Sensor (DEXCOM G6 SENSOR) MISC, Change every 10 days  Continuous Blood Gluc Transmit (DEXCOM G6 " "TRANSMITTER) MISC, Change every 3 months  Nutritional Supplements (BOOST HIGH PROTEIN) LIQD, After above baseline labs are drawn, give: 6 mL/kg to maximum of 360 mL; the beverage is to be consumed within 5 minutes.  ondansetron (ZOFRAN) 4 MG tablet, Take 1 tablet (4 mg) by mouth every 6 hours At 8:00 am, 2:00 pm, 8:00 pm the day prior to your surgery with neomycin and flagyl.  STATIN NOT PRESCRIBED (INTENTIONAL), Please choose reason not prescribed from choices below.        Insulin Aspart (w/Niacinamide)  0.5-2.5 Units Subcutaneous Q4H     insulin glargine  4 Units Subcutaneous QAM AC     sodium chloride (PF)  3 mL Intracatheter Q8H            Review of Systems:   The 10 point review of systems is negative other than noted in the HPI.          Physical Exam:   BP (!) 150/74   Pulse 60   Temp 98.3  F (36.8  C) (Oral)   Resp 16   Ht 1.626 m (5' 4\")   Wt 52.8 kg (116 lb 4.8 oz)   LMP 12/19/2013   SpO2 94%   BMI 19.96 kg/m    General -thin female, appears tired  Eyes:  no scleral icterus or redness  Lungs: Clear bilaterally  Heart: regular rate   Abdomen: Mildly firm, flat, distended with moderate tenderness noted diffusely.  No rebound or guarding.  Multiple abdominal scars noted consistent with surgical history.  MSK: Extremities warm without edema  Neurologic: nonfocal  Psychiatric: Mood and affect appropriate  Skin: Without lesions, rashes, or jaundice         Data:   Labs reviewed      Imaging:  All imaging studies reviewed by me and my interpretation of the CT abd/pelv is: Very dilated loops of small bowel in the left upper quadrant including the JJ anastomosis.  Gastric remnant does not appear distended.  Distally decompressed loops of small bowel.  Difficult to define transition point.  Exam is limited as there is no IV contrast given      Eulalia Ballard MD  11/24/2021 11:13 AM     Time spent with the patient, reviewing the EMR, reviewing laboratory and imaging studies, more than 50% of which was " counseling and coordinating care:  60 minutes.

## 2021-11-24 NOTE — PLAN OF CARE
Vital Signs:blood pressure 152/82, all other vitals stable   Pain/Comfort:pain 6/10, comfortably sleeping   Assessment:Blood sugar at 0200 was 189  Diet:NPO  Output:voiding approprietly   Activity: Asleep all night   Plan: Continue to monitor

## 2021-11-25 ENCOUNTER — APPOINTMENT (OUTPATIENT)
Dept: GENERAL RADIOLOGY | Facility: CLINIC | Age: 56
End: 2021-11-25
Attending: SURGERY
Payer: COMMERCIAL

## 2021-11-25 LAB
ANION GAP SERPL CALCULATED.3IONS-SCNC: 4 MMOL/L (ref 3–14)
BUN SERPL-MCNC: 13 MG/DL (ref 7–30)
CALCIUM SERPL-MCNC: 8.4 MG/DL (ref 8.5–10.1)
CHLORIDE BLD-SCNC: 113 MMOL/L (ref 94–109)
CO2 SERPL-SCNC: 27 MMOL/L (ref 20–32)
CREAT SERPL-MCNC: 0.71 MG/DL (ref 0.52–1.04)
ERYTHROCYTE [DISTWIDTH] IN BLOOD BY AUTOMATED COUNT: 13.2 % (ref 10–15)
GFR SERPL CREATININE-BSD FRML MDRD: >90 ML/MIN/1.73M2
GLUCOSE BLD-MCNC: 134 MG/DL (ref 70–99)
GLUCOSE BLDC GLUCOMTR-MCNC: 110 MG/DL (ref 70–99)
GLUCOSE BLDC GLUCOMTR-MCNC: 113 MG/DL (ref 70–99)
GLUCOSE BLDC GLUCOMTR-MCNC: 146 MG/DL (ref 70–99)
GLUCOSE BLDC GLUCOMTR-MCNC: 153 MG/DL (ref 70–99)
GLUCOSE BLDC GLUCOMTR-MCNC: 73 MG/DL (ref 70–99)
GLUCOSE BLDC GLUCOMTR-MCNC: 76 MG/DL (ref 70–99)
GLUCOSE BLDC GLUCOMTR-MCNC: 87 MG/DL (ref 70–99)
HCT VFR BLD AUTO: 39.8 % (ref 35–47)
HGB BLD-MCNC: 12 G/DL (ref 11.7–15.7)
MCH RBC QN AUTO: 29.6 PG (ref 26.5–33)
MCHC RBC AUTO-ENTMCNC: 30.2 G/DL (ref 31.5–36.5)
MCV RBC AUTO: 98 FL (ref 78–100)
PLATELET # BLD AUTO: 344 10E3/UL (ref 150–450)
POTASSIUM BLD-SCNC: 3.4 MMOL/L (ref 3.4–5.3)
RBC # BLD AUTO: 4.06 10E6/UL (ref 3.8–5.2)
SODIUM SERPL-SCNC: 144 MMOL/L (ref 133–144)
WBC # BLD AUTO: 7.9 10E3/UL (ref 4–11)

## 2021-11-25 PROCEDURE — 74018 RADEX ABDOMEN 1 VIEW: CPT

## 2021-11-25 PROCEDURE — 120N000004 HC R&B MS OVERFLOW

## 2021-11-25 PROCEDURE — 80048 BASIC METABOLIC PNL TOTAL CA: CPT | Performed by: INTERNAL MEDICINE

## 2021-11-25 PROCEDURE — C9113 INJ PANTOPRAZOLE SODIUM, VIA: HCPCS | Performed by: INTERNAL MEDICINE

## 2021-11-25 PROCEDURE — 99233 SBSQ HOSP IP/OBS HIGH 50: CPT | Performed by: INTERNAL MEDICINE

## 2021-11-25 PROCEDURE — 258N000003 HC RX IP 258 OP 636: Performed by: INTERNAL MEDICINE

## 2021-11-25 PROCEDURE — 85027 COMPLETE CBC AUTOMATED: CPT | Performed by: INTERNAL MEDICINE

## 2021-11-25 PROCEDURE — 36415 COLL VENOUS BLD VENIPUNCTURE: CPT | Performed by: INTERNAL MEDICINE

## 2021-11-25 PROCEDURE — 120N000006 HC R&B PEDS

## 2021-11-25 PROCEDURE — 250N000011 HC RX IP 250 OP 636: Performed by: INTERNAL MEDICINE

## 2021-11-25 RX ORDER — DEXTROSE MONOHYDRATE, SODIUM CHLORIDE, AND POTASSIUM CHLORIDE 50; 1.49; 4.5 G/1000ML; G/1000ML; G/1000ML
INJECTION, SOLUTION INTRAVENOUS CONTINUOUS
Status: CANCELLED | OUTPATIENT
Start: 2021-11-25

## 2021-11-25 RX ADMIN — DIATRIZOATE MEGLUMINE AND DIATRIZOATE SODIUM 75 ML: 660; 100 SOLUTION ORAL; RECTAL at 11:29

## 2021-11-25 RX ADMIN — PANTOPRAZOLE SODIUM 40 MG: 40 INJECTION, POWDER, FOR SOLUTION INTRAVENOUS at 09:00

## 2021-11-25 RX ADMIN — DEXTROSE AND SODIUM CHLORIDE: 5; 900 INJECTION, SOLUTION INTRAVENOUS at 06:20

## 2021-11-25 RX ADMIN — PANTOPRAZOLE SODIUM 40 MG: 40 INJECTION, POWDER, FOR SOLUTION INTRAVENOUS at 20:47

## 2021-11-25 ASSESSMENT — ACTIVITIES OF DAILY LIVING (ADL)
ADLS_ACUITY_SCORE: 6

## 2021-11-25 NOTE — PLAN OF CARE
Vital Signs:stable   Pain/Comfort:denies pain   Assessment: Blood sugars over night have been 73, 87, 76, MD paged on blood sugars.   NGT to LIS, no output overnight.   Diet:NPO   Output:voiding approprietly   Activity/Ambulation: Asleep all night   Plan: Continue to monitor

## 2021-11-25 NOTE — PLAN OF CARE
Vital Signs: VSS. Afebrile.  Pain/Comfort: Denies pain  Assessment: Hypoactive bowel sounds. Abdomen flat.   Diet: NPO.  Output: Voiding. Passing gas. Small stool this morning.  Activity/Ambulation:  Social:  here to visit.  Plan: Doing gastrografin study, will have x-ray around 1930.

## 2021-11-25 NOTE — PROGRESS NOTES
Cross Coverage:     Patient is currently NPO due to SBO. BG 73, 87, 76. Patient has a hx of gastric bypass, pancreatectomy, islet cell autotransplant, and splenectomy. Start D5/NS and monitor BG given continued NPO status. Reassess insulin needs in AM.

## 2021-11-25 NOTE — PROGRESS NOTES
[facility]    Hospitalist Progress Note  Name: Lynn Thompson    MRN: [id number]  Provider:  Pedro Harden DO  Date of Service: 11/25/2021    Summary of Stay: Lynn Thompson is a 56 year old female with extensive medical and surgical history with remote ileocolic resection(1982), Quinn-en-Y gastric bypass (2001), abdominal plasty (2005), total pancreatectomy with auto islet transplantation, splenectomy, J-tube (2013), laparoscopic lysis of adhesions for SBO February 2015, laparotomy with lysis of adhesions for SBO December 2015 (most recent surgeries all done at the [facility]), IDDM and BLU (on CPAP) who was admitted on 11/23/21 with abdominal pain and was found to have SBO with KATLYN.  General surgery was consulted to see the patient.  She was made n.p.o. and an NG tube was placed with improvement in her symptoms.    TODAY'S PLAN:  Continue IVF with insulin therapy.  Pt with large BM this AM and denies abd pain/nausea.  Has NG tube as well.  Appreciate General Surgery recommendations.  Hopeful for clear liquid diet this evening vs tomorrow.  Await Gastrograffin study.  Would resume home medications once clear liquid diet started, but ultimately defer diet to GS.   at bedside.  All questions answered.    Problem List:   SBO: Presents with acute onset abdominal pain.  Not passing gas or stool.  CT abdomen did show small bowel obstruction.  I suspect that the etiology of her small bowel obstruction is related to adhesions given multiple prior surgeries.  She has a known history of recurrent small bowel obstruction secondary adhesions (last 2015).  She had several episodes of emesis overnight, NGT was placed on 11/24 with improvement in her symptoms.  - IVF, IV narcotics, antiemetics PRN  - NGT placed on 11/24  - Surgery consulted, appreciate recommendations  - If patient requires surgery she would need to be transferred to [facility] given her high surgical complexity  -  NPO     Acute kidney injury - Resolved: Creatinine is elevated to 1.48 on admission. 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 PTA has lows daily.  - Lantus 4 units QAM  - Very low insulin (patient uses her own Fiasp), appreciate pharmacy assistance     History of gastric bypass, pancreatectomy, islet cell autotransplant, splenectomy:  Ultimately the patient has complex intra-abdominal anatomy.  If the patient were to worsen from a small bowel obstruction standpoint, she would need transfer to [facility] if surgery is considered.     Hypothyroidism: Hold Synthroid until taking PO.     Insomnia: She takes both modafinil and trazodone. Holding given NPO status.     Anxiety: Hold PTA Cymbalta and Lexapro until diet started.     Hypertension: Not on any medications, likely worsened by acute illness.  PRN Hydralazine.     GERD: IV Protonix while NPO.     BLU: Nightly CPAP    DVT Prophylaxis: Pneumatic Compression Devices  Code Status: Full Code  Diet: NPO for Medical/Clinical Reasons Except for: Meds    Gonzalez Catheter: Not present  Disposition: Expected discharge in 1-2 days to home. Goals prior to discharge include tolerating oral diet.   Family updated today: Yes      Interval History   Pt seen and examined.   at bedside.  Pt reported small BM this AM and is passing gas.  Denies nausea or abd pain.    -Data reviewed today: I personally reviewed all new labs and imaging results over the last 24 hours.     Physical Exam   Temp: 98.4  F (36.9  C) Temp src: Oral BP: 136/77 Pulse: 59   Resp: 17 SpO2: 96 % O2 Device: None (Room air)    Vitals:    11/23/21 2102 11/24/21 0207   Weight: 51.3 kg (113 lb) 52.8 kg (116 lb 4.8 oz)     Vital Signs with Ranges  Temp:  [98.4  F (36.9  C)-100.2  F (37.9  C)] 98.4  F (36.9  C)  Pulse:  [59-73] 59  Resp:  [16-17] 17  BP: (131-165)/(72-84) 136/77  SpO2:   [92 %-96 %] 96 %  I/O last 3 completed shifts:  In: 2801.67 [I.V.:2801.67]  Out: 325 [Urine:100; Emesis/NG output:225]    GENERAL: No apparent distress. Awake, alert, and fully oriented.  HEENT: Normocephalic, atraumatic. Extraocular movements intact.  CARDIOVASCULAR: Regular rate and rhythm without murmurs or rubs. No S3.  PULMONARY: Clear bilaterally.  GASTROINTESTINAL: Soft, non-tender, non-distended. Bowel sounds normoactive.   EXTREMITIES: No cyanosis or clubbing. No edema.  NEUROLOGICAL: CN 2-12 grossly intact, no focal neurological deficits.  DERMATOLOGICAL: No rash, ulcer, bruising, nor jaundice.    Medications       diatrizoate meglumine-sodium  75 mL Per NG tube Once     diatrizoate meglumine-sodium  75 mL Oral Once     Insulin Aspart (w/Niacinamide)  0.5-2.5 Units Subcutaneous Q4H     insulin glargine  4 Units Subcutaneous QAM AC     pantoprazole (PROTONIX) IV  40 mg Intravenous BID     sodium chloride (PF)  3 mL Intracatheter Q8H     Data     Laboratory:  Recent Labs   Lab 11/25/21  0906 11/24/21  0624 11/23/21  2136   WBC 7.9 11.2* 9.9   HGB 12.0 11.6* 12.5   HCT 39.8 37.8 40.0   MCV 98 96 95    356 386     Recent Labs   Lab 11/25/21  1217 11/25/21  0906 11/25/21  0900 11/24/21  0825 11/24/21  0624 11/24/21  0224 11/23/21  2141 11/23/21  2136   NA  --  144  --   --  141  --   --  144   POTASSIUM  --  3.4  --   --  3.8  --   --  4.2   CHLORIDE  --  113*  --   --  109  --   --  117*   CO2  --  27  --   --  25  --   --  21   ANIONGAP  --  4  --   --  7  --   --  6   * 134* 113*   < > 253*   < >  --  164*   BUN  --  13  --   --  24  --   --  25   CR  --  0.71  --   --  0.90  --  1.6* 1.48*   GFRESTIMATED  --  >90  --   --  71  --  35* 39*   VALARIE  --  8.4*  --   --  8.4*  --   --  8.7    < > = values in this interval not displayed.     No results for input(s): CULT in the last 168 hours.    Imaging:  No results found for this or any previous visit (from the past 24 hour(s)).      Pedro Harden,    [facility] Hospitalist  [address] [place] [address]  11/25/2021

## 2021-11-25 NOTE — PLAN OF CARE
Temp: 98.9  F (37.2  C) Temp src: Oral BP: 131/75 Pulse: 73   Resp: 16 SpO2: 92 % O2 Device: None (Room air)       A&Ox4. Intermittent pain to back and abdomen relieved with heat application. Intermittent nausea, no emesis. NG to low intermittent suction. Had large soft BM this evening. PIV infusing NS. NPO. . Independent in room. Will continue POC.

## 2021-11-26 VITALS
TEMPERATURE: 98.4 F | BODY MASS INDEX: 19.85 KG/M2 | DIASTOLIC BLOOD PRESSURE: 72 MMHG | OXYGEN SATURATION: 96 % | RESPIRATION RATE: 16 BRPM | HEART RATE: 55 BPM | SYSTOLIC BLOOD PRESSURE: 129 MMHG | HEIGHT: 64 IN | WEIGHT: 116.3 LBS

## 2021-11-26 LAB
ALBUMIN SERPL-MCNC: 2.5 G/DL (ref 3.4–5)
ALP SERPL-CCNC: 138 U/L (ref 40–150)
ALT SERPL W P-5'-P-CCNC: 39 U/L (ref 0–50)
ANION GAP SERPL CALCULATED.3IONS-SCNC: 1 MMOL/L (ref 3–14)
AST SERPL W P-5'-P-CCNC: 23 U/L (ref 0–45)
BILIRUB DIRECT SERPL-MCNC: 0.1 MG/DL (ref 0–0.2)
BILIRUB SERPL-MCNC: 0.3 MG/DL (ref 0.2–1.3)
BUN SERPL-MCNC: 9 MG/DL (ref 7–30)
CALCIUM SERPL-MCNC: 8.6 MG/DL (ref 8.5–10.1)
CHLORIDE BLD-SCNC: 111 MMOL/L (ref 94–109)
CO2 SERPL-SCNC: 31 MMOL/L (ref 20–32)
CREAT SERPL-MCNC: 0.68 MG/DL (ref 0.52–1.04)
ERYTHROCYTE [DISTWIDTH] IN BLOOD BY AUTOMATED COUNT: 13.2 % (ref 10–15)
GFR SERPL CREATININE-BSD FRML MDRD: >90 ML/MIN/1.73M2
GLUCOSE BLD-MCNC: 139 MG/DL (ref 70–99)
GLUCOSE BLDC GLUCOMTR-MCNC: 194 MG/DL (ref 70–99)
GLUCOSE BLDC GLUCOMTR-MCNC: 88 MG/DL (ref 70–99)
GLUCOSE BLDC GLUCOMTR-MCNC: 95 MG/DL (ref 70–99)
HCT VFR BLD AUTO: 38.2 % (ref 35–47)
HGB BLD-MCNC: 11.8 G/DL (ref 11.7–15.7)
MCH RBC QN AUTO: 29.6 PG (ref 26.5–33)
MCHC RBC AUTO-ENTMCNC: 30.9 G/DL (ref 31.5–36.5)
MCV RBC AUTO: 96 FL (ref 78–100)
PLATELET # BLD AUTO: 342 10E3/UL (ref 150–450)
POTASSIUM BLD-SCNC: 3.8 MMOL/L (ref 3.4–5.3)
PROT SERPL-MCNC: 5.7 G/DL (ref 6.8–8.8)
RBC # BLD AUTO: 3.99 10E6/UL (ref 3.8–5.2)
SODIUM SERPL-SCNC: 143 MMOL/L (ref 133–144)
WBC # BLD AUTO: 11 10E3/UL (ref 4–11)

## 2021-11-26 PROCEDURE — 99239 HOSP IP/OBS DSCHRG MGMT >30: CPT | Performed by: INTERNAL MEDICINE

## 2021-11-26 PROCEDURE — 36415 COLL VENOUS BLD VENIPUNCTURE: CPT | Performed by: INTERNAL MEDICINE

## 2021-11-26 PROCEDURE — 82248 BILIRUBIN DIRECT: CPT | Performed by: INTERNAL MEDICINE

## 2021-11-26 PROCEDURE — 99231 SBSQ HOSP IP/OBS SF/LOW 25: CPT | Performed by: PHYSICIAN ASSISTANT

## 2021-11-26 PROCEDURE — C9113 INJ PANTOPRAZOLE SODIUM, VIA: HCPCS | Performed by: INTERNAL MEDICINE

## 2021-11-26 PROCEDURE — 80048 BASIC METABOLIC PNL TOTAL CA: CPT | Performed by: INTERNAL MEDICINE

## 2021-11-26 PROCEDURE — 85027 COMPLETE CBC AUTOMATED: CPT | Performed by: INTERNAL MEDICINE

## 2021-11-26 PROCEDURE — 250N000011 HC RX IP 250 OP 636: Performed by: INTERNAL MEDICINE

## 2021-11-26 RX ADMIN — PANTOPRAZOLE SODIUM 40 MG: 40 INJECTION, POWDER, FOR SOLUTION INTRAVENOUS at 07:54

## 2021-11-26 ASSESSMENT — ACTIVITIES OF DAILY LIVING (ADL)
ADLS_ACUITY_SCORE: 6

## 2021-11-26 NOTE — PROGRESS NOTES
[facility]    Hospitalist Progress Note  Name: Lynn Thompson    MRN: [id number]  Provider:  Pedro Harden DO  Date of Service: 11/26/2021    Summary of Stay: Lynn Thompson is a 56 year old female with extensive medical and surgical history with remote ileocolic resection(1982), Quinn-en-Y gastric bypass (2001), abdominal plasty (2005), total pancreatectomy with auto islet transplantation, splenectomy, J-tube (2013), laparoscopic lysis of adhesions for SBO February 2015, laparotomy with lysis of adhesions for SBO December 2015 (most recent surgeries all done at the [facility]), IDDM and BLU (on CPAP) who was admitted on 11/23/21 with abdominal pain and was found to have SBO with KATLYN.  General surgery was consulted to see the patient.  She was made n.p.o. and an NG tube was placed with improvement in her symptoms.  The patient's symptoms improved and she had a bowel movement and was passing gas.  She tolerated a full liquid diet and the NG tube was removed.  On 11/26/2021, the patient was medically stable for discharge home.    TODAY'S PLAN:  Pt tolerated a full liquid diet this AM.  Will discharge home with plans to advance diet at home.  Appreciate General Surgery recommendations.   at bedside.  All questions answered.    Problem List:   SBO: Presents with acute onset abdominal pain.  Not passing gas or stool.  CT abdomen did show small bowel obstruction.  I suspect that the etiology of her small bowel obstruction is related to adhesions given multiple prior surgeries.  She has a known history of recurrent small bowel obstruction secondary adhesions (last 2015).  She had several episodes of emesis overnight, NGT was placed on 11/24 with improvement in her symptoms.  - IVF, IV narcotics, antiemetics PRN  - NGT placed on 11/24, removed on 11/25  - Surgery consulted, appreciate recommendations  - If patient requires surgery she would need to be transferred to [facility] given  her high surgical complexity  - Tolerating full liquid diet     Acute kidney injury - Resolved: Creatinine is elevated to 1.48 on admission. 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 PTA has lows daily.  - Lantus 4 units QAM  - Very low insulin (patient uses her own Fiasp), appreciate pharmacy assistance     History of gastric bypass, pancreatectomy, islet cell autotransplant, splenectomy:  Ultimately the patient has complex intra-abdominal anatomy.  If the patient were to worsen from a small bowel obstruction standpoint, she would need transfer to [facility] if surgery is considered.     Hypothyroidism: Hold Synthroid until taking PO.     Insomnia: She takes both modafinil and trazodone.      Anxiety: Hold PTA Cymbalta and Lexapro until diet started.     Hypertension: Not on any medications, likely worsened by acute illness.  PRN Hydralazine.     GERD: IV Protonix while NPO.     BLU: Nightly CPAP    DVT Prophylaxis: Pneumatic Compression Devices  Code Status: Full Code  Diet: Combination Diet Full Liquid, Six Small Feedings Adult    Gonzalez Catheter: Not present  Disposition: Expected discharge today to home. Goals prior to discharge include tolerating oral diet.   Family updated today: Yes      Interval History   Pt seen and examined.  Pt reports over eating this morning and had a small amount of pain, but that has improved.  Had several BMs this morning, which is her normal.    -Data reviewed today: I personally reviewed all new labs and imaging results over the last 24 hours.     Physical Exam   Temp: 98.4  F (36.9  C) Temp src: Oral BP: 129/72 Pulse: 55   Resp: 16 SpO2: 96 % O2 Device: None (Room air)    Vitals:    11/23/21 2102 11/24/21 0207   Weight: 51.3 kg (113 lb) 52.8 kg (116 lb 4.8 oz)     Vital Signs with Ranges  Temp:  [98.4  F (36.9  C)-98.9  F (37.2  C)] 98.4  F (36.9   C)  Pulse:  [51-55] 55  Resp:  [16-17] 16  BP: (129-140)/(69-73) 129/72  SpO2:  [96 %] 96 %  I/O last 3 completed shifts:  In: 440 [P.O.:440]  Out: -     GENERAL: No apparent distress. Awake, alert, and fully oriented.  HEENT: Normocephalic, atraumatic. Extraocular movements intact.  CARDIOVASCULAR: Regular rate and rhythm without murmurs or rubs. No S3.  PULMONARY: Clear bilaterally.  GASTROINTESTINAL: Soft, non-tender, non-distended. Bowel sounds normoactive.   EXTREMITIES: No cyanosis or clubbing. No edema.  NEUROLOGICAL: CN 2-12 grossly intact, no focal neurological deficits.  DERMATOLOGICAL: No rash, ulcer, bruising, nor jaundice.    Medications       diatrizoate meglumine-sodium  75 mL Oral Once     Insulin Aspart (w/Niacinamide)  0.5-2.5 Units Subcutaneous Q4H     insulin glargine  4 Units Subcutaneous QAM AC     pantoprazole (PROTONIX) IV  40 mg Intravenous BID     sodium chloride (PF)  3 mL Intracatheter Q8H     Data     Laboratory:  Recent Labs   Lab 11/26/21  0706 11/25/21  0906 11/24/21  0624   WBC 11.0 7.9 11.2*   HGB 11.8 12.0 11.6*   HCT 38.2 39.8 37.8   MCV 96 98 96    344 356     Recent Labs   Lab 11/26/21  1108 11/26/21  0751 11/26/21  0706 11/25/21  1217 11/25/21  0906 11/24/21  0825 11/24/21  0624   NA  --   --  143  --  144  --  141   POTASSIUM  --   --  3.8  --  3.4  --  3.8   CHLORIDE  --   --  111*  --  113*  --  109   CO2  --   --  31  --  27  --  25   ANIONGAP  --   --  1*  --  4  --  7   * 95 139*   < > 134*   < > 253*   BUN  --   --  9  --  13  --  24   CR  --   --  0.68  --  0.71  --  0.90   GFRESTIMATED  --   --  >90  --  >90  --  71   VALARIE  --   --  8.6  --  8.4*  --  8.4*    < > = values in this interval not displayed.     No results for input(s): CULT in the last 168 hours.    Imaging:  Recent Results (from the past 24 hour(s))   XR Gastrografin Challenge    Narrative    EXAM: XR GASTROGRAFIN CHALLENGE  LOCATION: [facility]  DATE/TIME: 11/25/2021  11:08 AM    INDICATION: Gastrografin UGI Challenge, small bowel obstruction  COMPARISON: CT 11/23/2021  TECHNIQUE: Routine.    FINDINGS:  FLUOROSCOPIC TIME: 0 minutes.  NUMBER OF IMAGES: 2.      Impression    FINDINGS/IMPRESSION: Passage of oral contrast into the nondistended colon and rectum. No contrast is visualized within the small bowel which suggests interval resolution of the previously seen small bowel obstruction. Enteric suction tube with the side   port at the GE junction and tip within the proximal gastric lumen. Upper abdominal surgical clips. No definite free air.             Pedro Harden DO  [facility] Hospitalist  [address] [place] [address]  11/26/2021

## 2021-11-26 NOTE — DISCHARGE SUMMARY
Hospitalist Discharge Summary  [facility]    Lynn Thompson MRN# [id number]   YOB: 1965 Age: 56 year old     Date of Admission:  11/23/2021  Date of Discharge:  11/26/2021  2:00 PM  Admitting Physician:  Stuart Toledo DO  Discharge Physician:  Pedro Harden DO  Discharging Service:  Hospitalist     Primary Provider: Maryana Brooks          Discharge Diagnosis:     SBO: Presents with acute onset abdominal pain.  Not passing gas or stool.  CT abdomen did show small bowel obstruction.  I suspect that the etiology of her small bowel obstruction is related to adhesions given multiple prior surgeries.  She has a known history of recurrent small bowel obstruction secondary adhesions (last 2015).  She had several episodes of emesis overnight, NGT was placed on 11/24 with improvement in her symptoms.  - IVF, IV narcotics, antiemetics PRN  - NGT placed on 11/24, removed on 11/25  - Surgery consulted, appreciate recommendations  - If patient requires surgery she would need to be transferred to [facility] given her high surgical complexity  - Tolerating full liquid diet     Acute kidney injury - Resolved: Creatinine is elevated to 1.48 on admission. 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 PTA has lows daily.  - Lantus 4 units QAM  - Very low insulin (patient uses her own Fiasp), appreciate pharmacy assistance     History of gastric bypass, pancreatectomy, islet cell autotransplant, splenectomy:  Ultimately the patient has complex intra-abdominal anatomy.  If the patient were to worsen from a small bowel obstruction standpoint, she would need transfer to [facility] if surgery is considered.     Hypothyroidism: Hold Synthroid until taking PO.     Insomnia: She takes both modafinil and trazodone.      Anxiety: Hold PTA Cymbalta and Lexapro until diet  started.     Hypertension: Not on any medications, likely worsened by acute illness.  PRN Hydralazine.     GERD: IV Protonix while NPO.     BLU: Nightly CPAP             Discharge Disposition:     Discharged to home           Allergies:     Allergies   Allergen Reactions     Corticosteroids Other (See Comments)     All oral, IV and injectable steroids are contraindicated (unless in life threatening situations) in Islet Auto transplant recipients. They can cause irreversible loss of islet cell function. Please contact patient's transplant care coordinator, Erlinda Multani RN BSN at [phone]/pager: [phone] and endocrinologist prior to administration.       Povidone Iodine Hives     Causes skin to blister     Naproxen      Other reaction(s): Abdominal Pain  Pt allergic to Naprosyn     Nsaids      naprosyn = GI upset     Povidone Iodine      blisters     Sulfasalazine Nausea and Nausea and Vomiting              Discharge Medications:     Discharge Medication List as of 11/26/2021  1:36 PM      CONTINUE these medications which have NOT CHANGED    Details   amylase-lipase-protease (VIOKACE) 47637-92374 units TABS tablet Take 4-5 with meals and 2 with snacks, Disp-500 tablet, R-11, E-Prescribe      calcium carbonate 600 mg-vitamin D 400 units (CALTRATE) 600-400 MG-UNIT per tablet Take 1 tablet by mouth daily, Historical      DULoxetine (CYMBALTA) 20 MG capsule Take 20 mg by mouth daily, Historical      escitalopram (LEXAPRO) 20 MG tablet Take 20 mg by mouth daily, Historical      estradiol (ESTRACE) 0.1 MG/GM vaginal cream PLACE 2 GRAMS VAGINALLY 2 TIMES WEEKLYDisp-42.5 g, [id number]      famotidine (PEPCID) 40 MG tablet Take 1 tablet (40 mg) by mouth 2 times daily as needed for heartburn, Disp-180 tablet, R-3, E-Prescribe      FIASP PENFILL 100 UNIT/ML SOCT Inject 0.5-4 Units Subcutaneous 4 times daily (with meals and nightly), Disp-15 mL, R-5, AMBER, E-Prescribe      gabapentin (NEURONTIN) 300 MG capsule  "Take 300 mg by mouth nightly as needed, Historical      Glucagon (GVOKE HYPOPEN 1-PACK) 1 MG/0.2ML SOAJ Inject 1 mg Subcutaneous once as needed (for hypoglycemia with loss of consciousness), Disp-15 mL, R-5, E-Prescribe      glucose 40 % GEL Take 15-30 g by mouth every 15 minutes as needed.Disp-1 Tube, [id number]      hydrOXYzine (ATARAX) 25 MG tablet TAKE 1-2 TABLETS BY MOUTH 2 TIMES DAILY AS NEEDED FOR ANXIETY, R-0, Historical      insulin glargine (LANTUS PEN) 100 UNIT/ML pen Inject 6 units daily, Disp-15 mL, R-3, E-PrescribeIf Lantus is not covered by insurance, may substitute Basaglar at same dose and frequency.        levothyroxine (SYNTHROID/LEVOTHROID) 100 MCG tablet Take 1 tablet (100 mcg) by mouth daily, Disp-90 tablet, R-3, E-Prescribe      loratadine (CLARITIN) 10 MG tablet Take 10 mg by mouth daily as needed , Historical      metroNIDAZOLE (FLAGYL) 500 MG tablet Take 1 tablet (500 mg) by mouth every 6 hours At 8:00 am, 2:00 pm, 8:00 pm the day prior to your surgery with neomycin and zofran., Disp-3 tablet, R-0, E-Prescribe      modafinil (PROVIGIL) 200 MG tablet Take 400 mg by mouth daily, Historical      neomycin (MYCIFRADIN) 500 MG tablet Take 2 tablets (1,000 mg) by mouth every 6 hours At 8:00 am, 2:00 pm, 8:00 pm the day prior to your surgery with flagyl and zofran., Disp-6 tablet, R-0, E-Prescribe      omeprazole (PRILOSEC) 40 MG DR capsule Take 1 capsule (40 mg) by mouth 2 times daily Take 30-60 minutes before a meal., Disp-180 capsule, R-3, E-Prescribe      polyethylene glycol (MIRALAX) 17 g packet Take 238 g by mouth See Admin Instructions Starting at 4 pm night prior to surgery. Refer to \"Getting Ready for Surgery\" instructions., Disp-14 packet, R-0, E-Prescribe      traZODone (DESYREL) 50 MG tablet Take 50 mg by mouth nightly as needed for sleep, Historical      Continuous Blood Gluc  (DEXCOM G6 ) MODESTA Use as directed to check blood glucose levels, Disp-1 each, R-1, Local " "Print      Continuous Blood Gluc Sensor (DEXCOM G6 SENSOR) MISC Change every 10 days, Disp-3 each, R-11, Local Print      Continuous Blood Gluc Transmit (DEXCOM G6 TRANSMITTER) MISC Change every 3 months, Disp-1 each, R-3, Local Print      Nutritional Supplements (BOOST HIGH PROTEIN) LIQD After above baseline labs are drawn, give: 6 mL/kg to maximum of 360 mL; the beverage is to be consumed within 5 minutes., R-0, No Print OutIf on pancreatic enzymes, patient may take home enzymes with Boost beverage.   Patients may take long acting insu ghulam (Levemir and Lantus).   Patient should NOT cover Boost with Novolog, Humalog, Apidra, or regular insulin.      ondansetron (ZOFRAN) 4 MG tablet Take 1 tablet (4 mg) by mouth every 6 hours At 8:00 am, 2:00 pm, 8:00 pm the day prior to your surgery with neomycin and flagyl., Disp-3 tablet, R-0, E-Prescribe      STATIN NOT PRESCRIBED (INTENTIONAL) Reason Statin was Not Prescribed: Drug interaction (valid drug-drug interactions include HIV protease inhibitors, nefazodone, cyclosporine, gemfibrozil, and danazol)No Print Out                    Condition on Discharge:     Discharge condition: Fair   Discharge vitals: Blood pressure 129/72, pulse 55, temperature 98.4  F (36.9  C), temperature source Oral, resp. rate 16, height 1.626 m (5' 4\"), weight 52.8 kg (116 lb 4.8 oz), last menstrual period 12/19/2013, SpO2 96 %, not currently breastfeeding.   Code status on discharge: Full Code      BASIC PHYSICAL EXAMINATION:  GENERAL: No apparent distress.  CARDIOVASCULAR: Regular rate and rhythm without murmurs.  PULMONARY: Clear to auscultation bilaterally.   GASTROINTESTINAL: Abdomen soft, non-tender.  EXTREMITIES: No edema, pulses intact.  NEUROLOGIC: No focal deficits.            History of Illness:   See detailed admission note for full details.               Procedures excluding imaging which is summarized below:     Please see details in the electronic medical record.           " Consultations:     SURGERY GENERAL IP CONSULT          Significant Results:     Results for orders placed or performed during the hospital encounter of 11/23/21   Abd/pelvis CT no contrast - Stone Protocol    Narrative    EXAM: CT ABDOMEN PELVIS W/O CONTRAST  LOCATION: [facility]  DATE/TIME: 11/23/2021 10:13 PM    INDICATION: Abdominal distension  COMPARISON: 03/01/2021  TECHNIQUE: CT scan of the abdomen and pelvis was performed without IV contrast. Multiplanar reformats were obtained. Dose reduction techniques were used.  CONTRAST: None.    FINDINGS:   LOWER CHEST: Normal.    HEPATOBILIARY: Cholecystectomy and pneumobilia similar to previous exam.    PANCREAS: Postoperative changes appear to relate to prior pancreatectomy, no definite residual gland evident on this noncontrast study.    SPLEEN: Prior splenectomy.    ADRENAL GLANDS: Normal.    KIDNEYS/BLADDER: Left kidney contains a 3 mm lower pole stone. Right kidney contains a 1 mm lower pole stone. No hydronephrosis or renal mass evident.    BOWEL: Previous gastric bypass surgery. There is also been surgery near ileocolic junction. Resolution of the inflammatory wall thickening previously involving right hemicolon.  There is new dilatation of proximal small bowel loops including region of fecalization of internal contents at mid small bowel. Distal small bowel normal in caliber. Findings would be consistent with partial mid small bowel obstruction.    LYMPH NODES: No definite lymphadenopathy.    VASCULATURE: Unremarkable.    PELVIC ORGANS: No adnexal lesions. Wisp of free pelvic fluid.    MUSCULOSKELETAL: Normal.      Impression    IMPRESSION:   1.  Mid small bowel obstruction, etiology uncertain.  2.  Resolution of previous colitis.  3.  Tiny renal calculi, no hydronephrosis.     XR Gastrografin Challenge    Narrative    EXAM: XR GASTROGRAFIN CHALLENGE  LOCATION: [facility]  DATE/TIME: 11/25/2021 11:08  AM    INDICATION: Gastrografin UGI Challenge, small bowel obstruction  COMPARISON: CT 11/23/2021  TECHNIQUE: Routine.    FINDINGS:  FLUOROSCOPIC TIME: 0 minutes.  NUMBER OF IMAGES: 2.      Impression    FINDINGS/IMPRESSION: Passage of oral contrast into the nondistended colon and rectum. No contrast is visualized within the small bowel which suggests interval resolution of the previously seen small bowel obstruction. Enteric suction tube with the side   port at the GE junction and tip within the proximal gastric lumen. Upper abdominal surgical clips. No definite free air.         *Note: Due to a large number of results and/or encounters for the requested time period, some results have not been displayed. A complete set of results can be found in Results Review.       Transthoracic Echocardiogram Results:  No results found for this or any previous visit (from the past 4320 hour(s)).             Pending Results:     Unresulted Labs Ordered in the Past 30 Days of this Admission     No orders found from 10/24/2021 to 11/24/2021.                      Discharge Instructions and Follow-Up:     Discharge instructions and follow-up:   Discharge Procedure Orders   Reason for your hospital stay   Order Comments: Small Bowel Obstruction     Follow-up and recommended labs and tests    Order Comments: Follow up with primary care provider, Maryana Brooks, within 7 days for hospital follow- up.  The following labs/tests are recommended: CBC, BMP.     Activity   Order Comments: Your activity upon discharge: activity as tolerated     Order Specific Question Answer Comments   Is discharge order? Yes      Diet   Order Comments: Follow this diet upon discharge: Orders Placed This Encounter      Combination Diet Full Liquid, Six Small Feedings Adult     Order Specific Question Answer Comments   Is discharge order? Yes              Hospital Course:     Lynn Thompson is a 56 year old female with extensive medical and surgical  history with remote ileocolic resection(1982), Quinn-en-Y gastric bypass (2001), abdominal plasty (2005), total pancreatectomy with auto islet transplantation, splenectomy, J-tube (2013), laparoscopic lysis of adhesions for SBO February 2015, laparotomy with lysis of adhesions for SBO December 2015 (most recent surgeries all done at the [facility]), IDDM and BLU (on CPAP) who was admitted on 11/23/21 with abdominal pain and was found to have SBO with KATLYN.  General surgery was consulted to see the patient.  She was made n.p.o. and an NG tube was placed with improvement in her symptoms.  The patient's symptoms improved and she had a bowel movement and was passing gas.  She tolerated a full liquid diet and the NG tube was removed.  On 11/26/2021, the patient was medically stable for discharge home.    The patient was seen, examined, and counseled on this day. The hospitalization and plan of care was reviewed with the patient extensively. All questions were addressed and the patient agreed to follow-up as noted above.      Total time spent in face to face contact with the patient and coordinating discharge was:  34 Minutes    Pedro Harden DO  [facility] Hospitalist  [address] [place] [address]  11/26/2021

## 2021-11-26 NOTE — PLAN OF CARE
Alert, oriented x4, and independent in room. Tolerating clear liquid diet yesterday and this morning. Advanced to full liquid for lunch, tolerated well. Denies any pain, nausea, or vomiting. Plan to discharge to home,  here to transport to home. Discharge instructions reviewed. All questions answered. Patient packed up all belongings.

## 2021-11-26 NOTE — DISCHARGE INSTRUCTIONS
"HOME CARE FOLLOWING BOWEL OBSTRUCTION ADMISSION  MARY LOU Chanel, LELA Jeong, KRYSTAL Engle, TAYLOR Ballard    ACTIVITY:  Light Activity -- you may immediately be up and about as tolerated.  Driving -- you may drive when comfortable and off narcotic pain medications.  Light Work -- resume when comfortable off pain medications.  (If you can drive, you probably can work.)  Strenuous Work/Activity -- limit lifting to 15 pounds for 1-2 weeks.  Then, progressively increase with time.  Active Sports (running, biking, etc.) -- cautiously resume after 4 weeks, or when cleared by your surgeon.    DISCOMFORT:  Expect gradual improvement of residual abdominal soreness as your bowel function continues to return to normal over the following 1-2 weeks.  Please contact the office if you have worsening of abdominal pain, or onset of nausea/vomiting.    DIET:  Continue on a \"soft\" diet (i.e. cooked vegetables, soups, processed meats, light/white bread, mashed potatoes, rice, yogurt) for the first one to two week after discharge from the hospital.  After this time, you may return to diet you were on before surgery minimizing high cellulose foods and foods with \"skins\" (i.e.grapes, apple, citrus, corn) only.  This would include limiting: brussel sprouts, cabbage & kale, alfalfa sprouts, zucchini, squash, potatoes, carrots, and yams.  Drink plenty of fluids.    SURGEON CONTACT/APPOINTMENTS:  Office Phone:  [phone]     CONTACT US IF THE FOLLOWING DEVELOPS:   1. A fever that is above 101     2. Severe pain that is not relieved by your prescription.        If you have other questions, please call the office Monday thru Friday between 8am and 5pm to discuss with the nurse or physician assistant.  #[phone]    There is a surgeon ON CALL on weekday evenings and over the weekend in case of urgent need only, and may be contacted at the same number.    If you are having an emergency, call 911 or proceed to your " nearest emergency department.

## 2021-11-26 NOTE — PLAN OF CARE
Temp: 98.4  F (36.9  C) Temp src: Oral BP: (!) 140/69 Pulse: 51   Resp: 16 SpO2: 96 % O2 Device: None (Room air)       Gastrografin done. NG removed at 2045 last evening. Denies pain or nausea. Passing gas. Tolerating a clear liquid diet. , 88. PIV SL. Independent in room. Will continue POC.

## 2021-11-26 NOTE — PROGRESS NOTES
Care Management Discharge Note    Discharge Date: 11/26/2021       Discharge Disposition:  Home    Handoff Referral Completed: No    Additional Information:  Pt identified as a Service Bundle #3. No needs or assessment needed at this time. Please consult CM/SW  if discharge needs should arise.      Hannah Perea, RN      Hannah Perea RN Case Manager  Inpatient Care Coordination  [facility]   [phone]

## 2021-11-26 NOTE — PROGRESS NOTES
"[facility]   General Surgery Progress Note           Assessment and Plan:   Assessment: Lynn Thompson is a 56 year old female with extensive medical and surgical history, most notably remote ileocolic resection in 1982, Quinn-en-Y gastric bypass in 2001, abdominoplasty in 2005, total pancreatectomy with auto islet transplantation, splenectomy, J-tube in 2013, laparoscopic lysis of adhesions for SBO February 2015, laparotomy with lysis of adhesions for SBO December 2015 (most recent surgeries all done at the [facility] by Dr. Early), admitted with a small bowel obstruction.    Clinically improving and passing stool now.    Plan:   - Awaiting results of gastrografin challenge and will likely be able to remove NG tube tonight and start sips of clears.   - Thankfully she seems to be improving, but if surgical intervention is needed would attempt to transfer to [facility] to extremely complex past surgical history.     Erin Evans MD        Interval History:   Feeling better today, had a bowel movement this morning. Passing some flatus. No nausea with NG tube in place. Pain is much improved.          Physical Exam:   Blood pressure 136/73, pulse 54, temperature 98.9  F (37.2  C), temperature source Oral, resp. rate 17, height 1.626 m (5' 4\"), weight 52.8 kg (116 lb 4.8 oz), last menstrual period 12/19/2013, SpO2 96 %, not currently breastfeeding.    I/O last 3 completed shifts:  In: 2801.67 [I.V.:2801.67]  Out: 75 [Emesis/NG output:75]  General -thin female, appears tired, but comfortable  Eyes:  no scleral icterus or redness  Lungs: Clear bilaterally  Heart: regular rate   Abdomen: Soft, flat, non distended with no tenderness.  No rebound or guarding.  Multiple abdominal scars noted consistent with surgical history.  MSK: Extremities warm without edema  Neurologic: nonfocal  Psychiatric: Mood and affect appropriate  Skin: Without lesions, rashes, or jaundice           Data:     Recent Labs   Lab " 11/25/21  0906 11/24/21 0624 11/23/21 2136   WBC 7.9 11.2* 9.9   HGB 12.0 11.6* 12.5   HCT 39.8 37.8 40.0   MCV 98 96 95    356 386     Recent Labs   Lab 11/25/21  1650 11/25/21  1217 11/25/21  0906 11/24/21  0825 11/24/21 0624 11/24/21 0224 11/23/21 2141 11/23/21 2136   NA  --   --  144  --  141  --   --  144   POTASSIUM  --   --  3.4  --  3.8  --   --  4.2   CHLORIDE  --   --  113*  --  109  --   --  117*   CO2  --   --  27  --  25  --   --  21   ANIONGAP  --   --  4  --  7  --   --  6   * 153* 134*   < > 253*   < >  --  164*   BUN  --   --  13  --  24  --   --  25   CR  --   --  0.71  --  0.90  --  1.6* 1.48*   GFRESTIMATED  --   --  >90  --  71  --  35* 39*   VALARIE  --   --  8.4*  --  8.4*  --   --  8.7   PROTTOTAL  --   --   --   --   --   --   --  6.9   ALBUMIN  --   --   --   --   --   --   --  3.4   BILITOTAL  --   --   --   --   --   --   --  0.2   ALKPHOS  --   --   --   --   --   --   --  156*   AST  --   --   --   --   --   --   --  41   ALT  --   --   --   --   --   --   --  47    < > = values in this interval not displayed.       Erin Evans MD

## 2021-11-26 NOTE — PROGRESS NOTES
"[facility]   General Surgery Progress Note           Assessment and Plan:   Assessment: Lynn Thompson is a 56 year old female with extensive medical and surgical history, most notably remote ileocolic resection in 1982, Quinn-en-Y gastric bypass in 2001, abdominoplasty in 2005, total pancreatectomy with auto islet transplantation, splenectomy, J-tube in 2013, laparoscopic lysis of adhesions for SBO February 2015, laparotomy with lysis of adhesions for SBO December 2015 (most recent surgeries all done at the [facility] by Dr. Early), admitted with a small bowel obstruction.    Clinically improving and passing stool    Plan:   - Diet: Ok to start full liquids, 6 small meals  - She is improving and does not look like she needs surgical intervention, if needed would attempt to transfer to [facility] to extremely complex past surgical history.  - Disposition: Could discharge on a full liquid diet and slowly advance on her own at home. Discharge instructions in chart.        Interval History:   Feeling better today, has had multiple BMs. Tolerating clears just fine. Denies abdominal pain.         Physical Exam:   Blood pressure 129/72, pulse 55, temperature 98.4  F (36.9  C), temperature source Oral, resp. rate 16, height 1.626 m (5' 4\"), weight 52.8 kg (116 lb 4.8 oz), last menstrual period 12/19/2013, SpO2 96 %, not currently breastfeeding.    I/O last 3 completed shifts:  In: 440 [P.O.:440]  Out: -   General -thin female, appears comfortable  Abdomen: Soft, flat, non distended with no tenderness.  No rebound or guarding.  Multiple abdominal scars noted consistent with surgical history.         Data:     Recent Labs   Lab 11/26/21  0706 11/25/21  0906 11/24/21  0624   WBC 11.0 7.9 11.2*   HGB 11.8 12.0 11.6*   HCT 38.2 39.8 37.8   MCV 96 98 96    344 356     Recent Labs   Lab 11/26/21  0751 11/26/21  0706 11/26/21  0129 11/25/21  1217 11/25/21  0906 11/24/21  0825 11/24/21  0624 " 11/23/21 2141 11/23/21 2136   NA  --  143  --   --  144  --  141  --  144   POTASSIUM  --  3.8  --   --  3.4  --  3.8  --  4.2   CHLORIDE  --  111*  --   --  113*  --  109  --  117*   CO2  --  31  --   --  27  --  25  --  21   ANIONGAP  --  1*  --   --  4  --  7  --  6   GLC 95 139* 88   < > 134*   < > 253*   < > 164*   BUN  --  9  --   --  13  --  24  --  25   CR  --  0.68  --   --  0.71  --  0.90   < > 1.48*   GFRESTIMATED  --  >90  --   --  >90  --  71   < > 39*   VALARIE  --  8.6  --   --  8.4*  --  8.4*  --  8.7   PROTTOTAL  --  5.7*  --   --   --   --   --   --  6.9   ALBUMIN  --  2.5*  --   --   --   --   --   --  3.4   BILITOTAL  --  0.3  --   --   --   --   --   --  0.2   ALKPHOS  --  138  --   --   --   --   --   --  156*   AST  --  23  --   --   --   --   --   --  41   ALT  --  39  --   --   --   --   --   --  47    < > = values in this interval not displayed.       Hermes Dai PA-C

## 2021-11-29 ENCOUNTER — TELEPHONE (OUTPATIENT)
Dept: FAMILY MEDICINE | Facility: CLINIC | Age: 56
End: 2021-11-29
Payer: MEDICARE

## 2021-11-30 ENCOUNTER — MYC MEDICAL ADVICE (OUTPATIENT)
Dept: FAMILY MEDICINE | Facility: CLINIC | Age: 56
End: 2021-11-30
Payer: MEDICARE

## 2021-11-30 NOTE — TELEPHONE ENCOUNTER
FUTURE VISIT INFORMATION        SURGERY INFORMATION:    Date: 2/1/22    Location: uu or    Surgeon:  Uriah Sheridan MD Fok, Nicole Zaman MD    Anesthesia Type:  general    Procedure: RECTOPEXY, OPEN SIGMOIDOSCOPY, FLEXIBLE supracervical hysterectomy, bilateral salpingooophrectomy SACROCOLPOPEXY versus uterosacral ligament suspension, pina colposuspension CYSTOSCOPY    Consult: ov 9/27     RECORDS REQUESTED FROM:          Primary Care Provider: Maryana Brooks MD- [facility]     Pertinent Medical History: tayler     Most recent Sleep Study:  4/27/17

## 2021-11-30 NOTE — TELEPHONE ENCOUNTER
Spoke to pt, she is doing okay after hospital visit.  She is a bit discouraged because they recommended clear/full liquid diet until surgery with Gaertner 2/1/22.  She is also experiencing bowel incontinence. She has a call out to Fatimah's nurse to see if this last visit may allow them to move up the surgery.      Has appt scheduled with PVP 12/2/21.    Alicia Toscano RN

## 2021-12-02 ENCOUNTER — OFFICE VISIT (OUTPATIENT)
Dept: FAMILY MEDICINE | Facility: CLINIC | Age: 56
End: 2021-12-02
Payer: COMMERCIAL

## 2021-12-02 VITALS
RESPIRATION RATE: 12 BRPM | WEIGHT: 112 LBS | SYSTOLIC BLOOD PRESSURE: 131 MMHG | DIASTOLIC BLOOD PRESSURE: 62 MMHG | HEART RATE: 60 BPM | BODY MASS INDEX: 19.12 KG/M2 | OXYGEN SATURATION: 100 % | TEMPERATURE: 98.8 F | HEIGHT: 64 IN

## 2021-12-02 DIAGNOSIS — K56.609 SMALL BOWEL OBSTRUCTION (H): ICD-10-CM

## 2021-12-02 DIAGNOSIS — R63.4 WEIGHT LOSS: ICD-10-CM

## 2021-12-02 DIAGNOSIS — Z09 HOSPITAL DISCHARGE FOLLOW-UP: Primary | ICD-10-CM

## 2021-12-02 DIAGNOSIS — K62.3 RECTAL PROLAPSE: ICD-10-CM

## 2021-12-02 DIAGNOSIS — N81.4 UTEROVAGINAL PROLAPSE, UNSPECIFIED: ICD-10-CM

## 2021-12-02 PROCEDURE — 99214 OFFICE O/P EST MOD 30 MIN: CPT | Mod: 25 | Performed by: FAMILY MEDICINE

## 2021-12-02 PROCEDURE — 90471 IMMUNIZATION ADMIN: CPT | Performed by: FAMILY MEDICINE

## 2021-12-02 PROCEDURE — 90662 IIV NO PRSV INCREASED AG IM: CPT | Performed by: FAMILY MEDICINE

## 2021-12-02 ASSESSMENT — MIFFLIN-ST. JEOR: SCORE: 1073.03

## 2021-12-02 NOTE — PROGRESS NOTES
"  Assessment & Plan     Hospital discharge follow-up    Small bowel obstruction (H) - continues to have post prandial pain. Advised she monitor closely. If pain increases, should be seen urgently again. She agrees.     Weight loss - advised continue with 3 meals if tolerating, add Ensure or Boost. Can also go to smaller snacks during the day.     Uterovaginal prolapse, unspecified - surgery planned for Feb    Rectal prolapse - surgery planned for Feb.     Return in about 8 months (around 8/2/2022) for Yearly Preventive Exam.    Maryana Brooks MD  [facility] TERESITA Bailey is a 56 year old who presents for the following health issues     HPI     ED/UC Followup:    Facility:  [facility] ED  Date of visit: 11/23/2021  Reason for visit: Bowel Obstruction, Kidney Injury  Current Status: Patient is struggling to eat       Small bowel instruction - resolved with bowel rest.   Has had struggles with abdominal pains off and on.  History of several abdominal surgeries, adhesions.   She is concerned that she may need colostomy in the future.   She reports continued abdominal pain following eating softs. Same pain, no worse.   Still passing stool.     Planned surgeries for hysterectomy and rectopexy in February. She is concerns she will not be able to wait that long. Sounds like there are 2 surgeons coordinating their schedules.       Review of Systems   Constitutional, HEENT, cardiovascular, pulmonary, gi and gu systems are negative, except as otherwise noted.      Objective    /62 (Cuff Size: Adult Regular)   Pulse 60   Temp 98.8  F (37.1  C) (Oral)   Resp 12   Ht 1.626 m (5' 4\")   Wt 50.8 kg (112 lb)   LMP 12/19/2013   SpO2 100%   BMI 19.22 kg/m    Body mass index is 19.22 kg/m .  Physical Exam   GENERAL: healthy, alert and no distress  PSYCH: mentation appears normal, affect normal/bright        "

## 2021-12-20 ENCOUNTER — HOSPITAL ENCOUNTER (EMERGENCY)
Facility: CLINIC | Age: 56
Discharge: HOME | End: 2021-12-21
Attending: EMERGENCY MEDICINE | Admitting: EMERGENCY MEDICINE
Payer: COMMERCIAL

## 2021-12-20 VITALS
OXYGEN SATURATION: 99 % | TEMPERATURE: 98.3 F | RESPIRATION RATE: 20 BRPM | SYSTOLIC BLOOD PRESSURE: 150 MMHG | HEART RATE: 71 BPM | DIASTOLIC BLOOD PRESSURE: 81 MMHG
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
## 2021-12-20 PROCEDURE — 99284 EMERGENCY DEPT VISIT MOD MDM: CPT

## 2021-12-20 PROCEDURE — 250N000013 HC RX MED GY IP 250 OP 250 PS 637: Performed by: EMERGENCY MEDICINE

## 2021-12-20 RX ORDER — FLUCONAZOLE 150 MG/1
TABLET ORAL
Qty: 2 TABLET | Refills: 0 | Status: SHIPPED | OUTPATIENT
Start: 2021-12-20 | End: 2021-12-23

## 2021-12-20 RX ORDER — LIDOCAINE HYDROCHLORIDE 10 MG/ML
INJECTION, SOLUTION EPIDURAL; INFILTRATION; INTRACAUDAL; PERINEURAL
Status: DISCONTINUED
Start: 2021-12-20 | End: 2021-12-21 | Stop reason: HOSPADM

## 2021-12-20 RX ADMIN — AMOXICILLIN AND CLAVULANATE POTASSIUM 1 TABLET: 875; 125 TABLET, FILM COATED ORAL at 22:05

## 2021-12-20 ASSESSMENT — ENCOUNTER SYMPTOMS: WOUND: 1

## 2021-12-21 NOTE — DISCHARGE INSTRUCTIONS
What do you do next:   Continue your home medications unless we have specifically changed them  Take the antibiotics I prescribed as directed.  **The hand trauma clinic will call you in the next business day to discuss further follow-up**  Follow up as indicated below    When do you return: If you have uncontrolled pain, uncontrollable fevers, persistent bloody or foul-smelling drainage, rapidly spreading redness, painful motion of your thumb, or any other symptoms that concern you, please return to the ED for reevaluation.    Thank you for allowing us to care for you today.

## 2021-12-21 NOTE — ED TRIAGE NOTES
Pt states dog bite from her dog to right thumb tonight. Pt states dog UTD on rabies vaccine and pt states her most recent TDAP was 18 months ago. CMS intact GCS 15

## 2021-12-21 NOTE — ED PROVIDER NOTES
History   Chief Complaint:  Dog Bite    The history is provided by the patient.      Lynn Thompson is a 56 year old female with history of type I diabetes, hypertension, and hypothyroidism who presents with a dog bite. The patient reports that her right thumb was bitten by her dog (lab mix) at around 1930 this evening. She says it was a quick bite; the dog did not hang on. She washed her hand at home for about a minute, and then came to the ED. The patient says that the dog's rabies vaccination is up-to-date, and she last received her Td/Tdap on 20.     Review of Systems   Skin: Positive for wound.   All other systems reviewed and are negative.    Allergies:  Iodine   Corticosteroids  Povidone Iodine  Naproxen  Nsaids  Povidone Iodine  Sulfasalazine    Medications:  Viokace  Caltrate  Cymbalta  Lexapro  Estrace  Pepcid  Neurontin  Atarax  Lantus Pen  Synthroid  Claritin  Flagyl  Provigil  Mycifradin  Prilosec  Zofran  Miralax  Desyrel    Past Medical History:    Paroxysmal positional vertigo  Kidney stones  Chronic pancreatitis  Depression  Type I Diabetes  Hypertension  BLU  Spasm of sphincter of Oddi  Thyroid nodule  Small bowel obstruction  Acute kidney injury  Uterovaginal prolapse  Rectal prolapse  Hypothyroidism  Asplenia  Exocrine pancreatic insufficiency  Iron deficiency anemia   PTSD  Crohn'sdisease     Past Surgical History:    Abdominoplasty  Appendectomy  Bunionectomy   section  Cholecystectomy  Colonoscopy x3  Combined cystoscopy, retrogrades, ureteroscopy, laser holmium lithotripsy ureters, insert stent x2  Cystectomy ovarian benign  EGD, combined  Extracorporeal shock wave lithotripsy  Gastric bypass NOS  Hernia repair  Irrigation and debridement hand, combined; left  Lap, lysis of adhesions  Pancreatectomy, transplant auto islet cell, combined  Partial ileum resection  Tonsillectomy  Adenoidectomy  Pancreatic auto-islet transplant     Family History:    Father: COPD, substance  abuse, depression, asthma  Brother: kidney stones     Social History:  The patient presents to the ED with a female .     Physical Exam     Patient Vitals for the past 24 hrs:   BP Temp Temp src Pulse Resp SpO2   12/20/21 2003 (!) 150/81 98.3  F (36.8  C) Oral 71 20 99 %       Physical Exam    HEENT:  mmm. Normal phonation.  Eyes: PERRL B/L  CV: Peripheral pulses in tact and regular  Resp: Speaking in full sentences without any respiratory distress  Musculoskeletal:  Right Hand    No TTP over distal radius or ulna  Can oppose thumb.  There is soft tissue swelling present over the right thumb  No sub-ungual hematoma noted at present  She is able to fire Hand/finger flexors and extensors.    Remainder of the skeletal survey is unremarkable    Skin: Warm, dry, well perfused. 1 cm right thumb bite wound.   Neuro: Alert, no gross motor or sensory deficits  Psych: Calm    Emergency Department Course     Emergency Department Course:    Reviewed:  I reviewed nursing notes, vitals, past medical history and care everywhere    Assessments/Consults:   ED Course as of 12/20/21 2230   Mon Dec 20, 2021   2140 I obtained history and examined the patient as noted above    2157 Called [facility] Hand Trauma voicemail       Interventions:  2150 Augmentin 1 tablet PO  2150 Xylocaine 1 % injection     Disposition:  The patient was discharged to home.     Impression & Plan     Medical Decision Making:  This 56-year-old female patient presents the ED due to a dog bite.  Please see the HPI and exam for specifics.  The dog was owned by the patient.  The dog's vaccinations are up-to-date and the patient's tetanus is also up-to-date.  Her wound was anesthetized, explored, and copiously irrigated (the patient also indicates she ran her hand under water for about 60 seconds at home).  She will be given antibiotics and I left a message on the [facility] voicemail number.  The patient was given antibiotics here and will be  discharged with prescriptions for that and for Diflucan as the patient notes a prior history of yeast infections from antibiotics.  She can follow-up as noted and certainly return with any new or worsening symptoms.  Anticipatory guidance given prior to discharge.    Covid-19  Lynn Thompson was evaluated during a global COVID-19 pandemic, which necessitated consideration that the patient might be at risk for infection with the SARS-CoV-2 virus that causes COVID-19.   Applicable protocols for evaluation were followed during the patient's care.     Diagnosis:    ICD-10-CM    1. Dog bite, initial encounter  W54.0XXA     right thumb       Discharge Medications:  New Prescriptions    AMOXICILLIN-CLAVULANATE (AUGMENTIN) 875-125 MG TABLET    Take 1 tablet by mouth 2 times daily for 7 days    FLUCONAZOLE (DIFLUCAN) 150 MG TABLET    Take one tablet now, and one tablet in three days       Scribe Disclosure:  I, Shankar Thakur, am serving as a scribe at 9:28 PM on 12/20/2021 to document services personally performed by James Torres DO based on my observations and the provider's statements to me.            James Torres DO  12/20/21 0867

## 2021-12-23 ENCOUNTER — TRANSFERRED RECORDS (OUTPATIENT)
Dept: HEALTH INFORMATION MANAGEMENT | Facility: CLINIC | Age: 56
End: 2021-12-23
Payer: MEDICARE

## 2021-12-31 DIAGNOSIS — Z90.410 POST-PANCREATECTOMY DIABETES (H): Primary | ICD-10-CM

## 2021-12-31 DIAGNOSIS — E13.9 POST-PANCREATECTOMY DIABETES (H): Primary | ICD-10-CM

## 2021-12-31 DIAGNOSIS — E89.1 POST-PANCREATECTOMY DIABETES (H): Primary | ICD-10-CM

## 2022-01-09 DIAGNOSIS — E89.1 POST-PANCREATECTOMY DIABETES (H): ICD-10-CM

## 2022-01-09 DIAGNOSIS — Z90.410 POST-PANCREATECTOMY DIABETES (H): ICD-10-CM

## 2022-01-09 DIAGNOSIS — E13.9 POST-PANCREATECTOMY DIABETES (H): ICD-10-CM

## 2022-01-11 RX ORDER — FLURBIPROFEN SODIUM 0.3 MG/ML
SOLUTION/ DROPS OPHTHALMIC
Qty: 100 EACH | OUTPATIENT
Start: 2022-01-11

## 2022-01-14 NOTE — PHARMACY - PREOPERATIVE ASSESSMENT CENTER
Preoperative Assessment Center Medication History Note    Medication history completed on January 14, 2022 by this writer. See Epic admission navigator for prior to admission medications. Operating room staff will still need to confirm medications and last dose information on day of surgery.     Medication history interview sources  Patient interview: Yes  Care Everywhere records: Yes  Surescripts pharmacy refill records: Yes  Other (if applicable): None    Changes made to PTA medication list  Added: None    Deleted: None    Changed: None    Additional medication history information (including reliability of information, actions taken by pharmacist):    -- No recent (within 30 days) course of antibiotics  -- No recent (within 30 days) course of systemic steroids  -- Patient declines being on any other prescription or over-the-counter medications  -- fiasp pen is sliding scale      Prior to Admission medications    Medication Sig Last Dose Taking? Auth Provider   amylase-lipase-protease (VIOKACE) 29342-49161 units TABS tablet Take 4-5 with meals and 2 with snacks  Patient taking differently: Take 4-5 with big meal  2-3 with small meals and snacks Taking Yes Adriana Robles MD   calcium carbonate 600 mg-vitamin D 400 units (CALTRATE) 600-400 MG-UNIT per tablet Take 1 tablet by mouth daily Taking Yes Unknown, Entered By History   DULoxetine (CYMBALTA) 20 MG capsule Take 20 mg by mouth daily Taking Yes Unknown, Entered By History   escitalopram (LEXAPRO) 20 MG tablet Take 20 mg by mouth daily Taking Yes Unknown, Entered By History   estradiol (ESTRACE) 0.1 MG/GM vaginal cream PLACE 2 GRAMS VAGINALLY 2 TIMES WEEKLY  Patient taking differently: PLACE 2 GRAMS VAGINALLY 2 TIMES WEEKLY  Thursdays and Sundays Taking Yes Kavitha Spencer DO   famotidine (PEPCID) 40 MG tablet Take 1 tablet (40 mg) by mouth 2 times daily as needed for heartburn Taking Yes Maryana Brooks MD   FIASP PENFILL 100 UNIT/ML SOCT Inject  0.5-4 Units Subcutaneous 4 times daily (with meals and nightly)  Patient taking differently: Inject 0.5-4 Units Subcutaneous 3 times daily  Taking Yes Adriana Robles MD   gabapentin (NEURONTIN) 300 MG capsule Take 300 mg by mouth nightly as needed Taking Yes Unknown, Entered By History   hydrOXYzine (ATARAX) 25 MG tablet TAKE 1-2 TABLETS BY MOUTH 2 TIMES DAILY AS NEEDED FOR ANXIETY Taking Yes Reported, Patient   insulin glargine (LANTUS PEN) 100 UNIT/ML pen Inject 6 units daily  Patient taking differently: Inject 5 Units Subcutaneous At Bedtime Inject 6 units daily Taking Yes Adriana Robles MD   levothyroxine (SYNTHROID/LEVOTHROID) 100 MCG tablet Take 1 tablet (100 mcg) by mouth daily Taking Yes Maryana Brooks MD   loratadine (CLARITIN) 10 MG tablet Take 10 mg by mouth daily as needed  Taking Yes Unknown, Entered By History   modafinil (PROVIGIL) 200 MG tablet Take 400 mg by mouth daily Taking Yes Unknown, Entered By History   omeprazole (PRILOSEC) 40 MG DR capsule Take 1 capsule (40 mg) by mouth 2 times daily Take 30-60 minutes before a meal. Taking Yes Maryana Brooks MD   traZODone (DESYREL) 50 MG tablet Take 50 mg by mouth nightly as needed for sleep Taking Yes Unknown, Entered By History   Continuous Blood Gluc  (DEXCOM G6 ) MODESTA Use as directed to check blood glucose levels   Adriana Robles MD   Continuous Blood Gluc Sensor (DEXCOM G6 SENSOR) MISC Change every 10 days   Adriana Robles MD   Continuous Blood Gluc Transmit (DEXCOM G6 TRANSMITTER) MISC Change every 3 months   Adriana Robles MD   Glucagon (GVOKE HYPOPEN 1-PACK) 1 MG/0.2ML SOAJ Inject 1 mg Subcutaneous once as needed (for hypoglycemia with loss of consciousness)   Adriana Robles MD   glucose 40 % GEL Take 15-30 g by mouth every 15 minutes as needed.   Suzi Short, MITZY CNP   insulin pen needle (32G X 4 MM) 32G X 4 MM miscellaneous Use 6 pen needles daily   Adriana Robles MD   metroNIDAZOLE  "(FLAGYL) 500 MG tablet Take 1 tablet (500 mg) by mouth every 6 hours At 8:00 am, 2:00 pm, 8:00 pm the day prior to your surgery with neomycin and zofran.   Uriah hSeridan MD   neomycin (MYCIFRADIN) 500 MG tablet Take 2 tablets (1,000 mg) by mouth every 6 hours At 8:00 am, 2:00 pm, 8:00 pm the day prior to your surgery with flagyl and zofran.   Uriah Sheridan MD   ondansetron (ZOFRAN) 4 MG tablet Take 1 tablet (4 mg) by mouth every 6 hours At 8:00 am, 2:00 pm, 8:00 pm the day prior to your surgery with neomycin and flagyl.   Uriah Sheridan MD   polyethylene glycol (MIRALAX) 17 g packet Take 238 g by mouth See Admin Instructions Starting at 4 pm night prior to surgery. Refer to \"Getting Ready for Surgery\" instructions.   Uriah Sheridan MD   STATIN NOT PRESCRIBED (INTENTIONAL) Please choose reason not prescribed from choices below.   Maryana Brooks MD         Medication history completed by: Elmo Ryan [facility]    "

## 2022-01-18 ENCOUNTER — PRE VISIT (OUTPATIENT)
Dept: SURGERY | Facility: CLINIC | Age: 57
End: 2022-01-18

## 2022-01-18 ENCOUNTER — LAB (OUTPATIENT)
Dept: LAB | Facility: CLINIC | Age: 57
End: 2022-01-18
Attending: COLON & RECTAL SURGERY

## 2022-01-18 ENCOUNTER — ANESTHESIA EVENT (OUTPATIENT)
Dept: SURGERY | Facility: CLINIC | Age: 57
End: 2022-01-18

## 2022-01-18 ENCOUNTER — OFFICE VISIT (OUTPATIENT)
Dept: SURGERY | Facility: CLINIC | Age: 57
End: 2022-01-18
Payer: COMMERCIAL

## 2022-01-18 VITALS
SYSTOLIC BLOOD PRESSURE: 164 MMHG | HEIGHT: 64 IN | OXYGEN SATURATION: 99 % | WEIGHT: 113.4 LBS | BODY MASS INDEX: 19.36 KG/M2 | RESPIRATION RATE: 16 BRPM | HEART RATE: 65 BPM | TEMPERATURE: 97.7 F | DIASTOLIC BLOOD PRESSURE: 93 MMHG

## 2022-01-18 DIAGNOSIS — Z01.818 PREOP EXAMINATION: ICD-10-CM

## 2022-01-18 DIAGNOSIS — E10.9 TYPE 1 DIABETES MELLITUS WITHOUT COMPLICATION (H): ICD-10-CM

## 2022-01-18 DIAGNOSIS — K62.3 RECTAL PROLAPSE: ICD-10-CM

## 2022-01-18 DIAGNOSIS — Z01.818 PREOP EXAMINATION: Primary | ICD-10-CM

## 2022-01-18 LAB
ABO/RH(D): NORMAL
ALBUMIN UR-MCNC: NEGATIVE MG/DL
ANION GAP SERPL CALCULATED.3IONS-SCNC: 8 MMOL/L (ref 3–14)
ANTIBODY SCREEN: NEGATIVE
APPEARANCE UR: ABNORMAL
BACTERIA #/AREA URNS HPF: ABNORMAL /HPF
BILIRUB UR QL STRIP: NEGATIVE
BUN SERPL-MCNC: 22 MG/DL (ref 7–30)
CALCIUM SERPL-MCNC: 8.8 MG/DL (ref 8.5–10.1)
CAOX CRY #/AREA URNS HPF: ABNORMAL /HPF
CHLORIDE BLD-SCNC: 109 MMOL/L (ref 94–109)
CO2 SERPL-SCNC: 27 MMOL/L (ref 20–32)
COLOR UR AUTO: YELLOW
CREAT SERPL-MCNC: 0.73 MG/DL (ref 0.52–1.04)
ERYTHROCYTE [DISTWIDTH] IN BLOOD BY AUTOMATED COUNT: 14.8 % (ref 10–15)
GFR SERPL CREATININE-BSD FRML MDRD: >90 ML/MIN/1.73M2
GLUCOSE BLD-MCNC: 123 MG/DL (ref 70–99)
GLUCOSE UR STRIP-MCNC: NEGATIVE MG/DL
HBA1C MFR BLD: 6.8 % (ref 0–5.6)
HCT VFR BLD AUTO: 39 % (ref 35–47)
HGB BLD-MCNC: 12.2 G/DL (ref 11.7–15.7)
HGB UR QL STRIP: NEGATIVE
KETONES UR STRIP-MCNC: NEGATIVE MG/DL
LEUKOCYTE ESTERASE UR QL STRIP: NEGATIVE
MCH RBC QN AUTO: 29 PG (ref 26.5–33)
MCHC RBC AUTO-ENTMCNC: 31.3 G/DL (ref 31.5–36.5)
MCV RBC AUTO: 93 FL (ref 78–100)
MUCOUS THREADS #/AREA URNS LPF: PRESENT /LPF
NITRATE UR QL: NEGATIVE
PH UR STRIP: 5 [PH] (ref 5–7)
PLATELET # BLD AUTO: 340 10E3/UL (ref 150–450)
POTASSIUM BLD-SCNC: 3.7 MMOL/L (ref 3.4–5.3)
RBC # BLD AUTO: 4.2 10E6/UL (ref 3.8–5.2)
RBC URINE: 1 /HPF
SODIUM SERPL-SCNC: 144 MMOL/L (ref 133–144)
SP GR UR STRIP: 1.01 (ref 1–1.03)
SPECIMEN EXPIRATION DATE: NORMAL
SQUAMOUS EPITHELIAL: 1 /HPF
UROBILINOGEN UR STRIP-MCNC: NORMAL MG/DL
WBC # BLD AUTO: 6.1 10E3/UL (ref 4–11)
WBC URINE: <1 /HPF

## 2022-01-18 PROCEDURE — 80048 BASIC METABOLIC PNL TOTAL CA: CPT | Performed by: PATHOLOGY

## 2022-01-18 PROCEDURE — 36415 COLL VENOUS BLD VENIPUNCTURE: CPT | Performed by: PATHOLOGY

## 2022-01-18 PROCEDURE — 83036 HEMOGLOBIN GLYCOSYLATED A1C: CPT | Performed by: PATHOLOGY

## 2022-01-18 PROCEDURE — 86901 BLOOD TYPING SEROLOGIC RH(D): CPT | Performed by: PATHOLOGY

## 2022-01-18 PROCEDURE — 86900 BLOOD TYPING SEROLOGIC ABO: CPT | Performed by: PATHOLOGY

## 2022-01-18 PROCEDURE — 85027 COMPLETE CBC AUTOMATED: CPT | Performed by: PATHOLOGY

## 2022-01-18 PROCEDURE — 86850 RBC ANTIBODY SCREEN: CPT | Performed by: PATHOLOGY

## 2022-01-18 PROCEDURE — 99203 OFFICE O/P NEW LOW 30 MIN: CPT | Performed by: PHYSICIAN ASSISTANT

## 2022-01-18 PROCEDURE — 81001 URINALYSIS AUTO W/SCOPE: CPT | Performed by: PATHOLOGY

## 2022-01-18 ASSESSMENT — LIFESTYLE VARIABLES: TOBACCO_USE: 0

## 2022-01-18 ASSESSMENT — MIFFLIN-ST. JEOR: SCORE: 1079.38

## 2022-01-18 ASSESSMENT — ENCOUNTER SYMPTOMS: SEIZURES: 0

## 2022-01-18 ASSESSMENT — PAIN SCALES - GENERAL: PAINLEVEL: NO PAIN (0)

## 2022-01-18 NOTE — PATIENT INSTRUCTIONS
Preparing for Your Surgery      Name:  Lynn Thompson   MRN:  [id number]   :  1965   Today's Date:  2022       Arriving for surgery:  Surgery date:  22  Arrival time:  06:30 a.m.    Restrictions due to COVID 19       Effective 22 [facility] is implementing the following visitor policy:     1 person may accompany the patient through the Pre-Op process.      Then that person will be asked to leave the building.        There will be no visitors in the Surgery Waiting Room.        Inpatients are allowed 1 consistent visitor for the duration of their stay.      Visitors must wear a mask.      Visitors must not be ill.      Visiting hours are 8 am to 8 pm.    [id number] parking is available for anyone with mobility limitations or disabilities.  ([place]  24 hours/ 7 days a week; [facility]  7 am- 3:30 pm, Mon- Fri)    Please come to:     [facility] [address]  [address] [place] [address]    - ? parking is available in front of the hospital      -    Please proceed to [address] on the 3rd floor. [phone]?     - ?If you are in need of directions, wheelchair or escort please stop at the Information Desk in the lobby.  Inform the information person that you are here for surgery; a wheelchair and escort to [address] will be provided.?     What can I eat or drink?  Please follow dietary guidelines and bowel prep instructions from your surgeon.  -  You may have clear liquids until 2 hours before surgery. (Until 06:30 a.m.)    Examples of clear liquids:  Water  Clear broth  Juices (apple, white grape, white cranberry  and cider) without pulp  Noncarbonated, powder based beverages  (lemonade and Harsh-Aid)  Sodas (Sprite, 7-Up, ginger ale and seltzer)  Coffee or tea (without milk or cream)  Gatorade    -  No Alcohol for at least 24 hours before surgery     Which medicines can I take?    Hold Aspirin for 7 days before surgery.    Hold Multivitamins for 7 days before surgery.  Hold Supplements for 7 days before surgery.  Hold Ibuprofen (Advil, Motrin) for 1 day before surgery--unless otherwise directed by surgeon.  Hold Naproxen (Aleve) for 4 days before surgery.    -  DO NOT take these medications the day of surgery:  Amylase-lipase-protease (Viokace), Fiasp (Aspart) insulin, Modafinil (Provigil)    -  PLEASE TAKE these medications the day of surgery:  Duloxetine (Cymbalta), Escitalopram (Lexapro), Famotidine (Pepcid),   Gabapentin (Neurontin), Hydroxyzine (Atarax) if needed, Levothyroxine (Synthroid),   Loratadine (Claritin), Omeprazole (Prilosec)  Please take 5 units Glargine (Lantus) insulin (80% of your normal medication).    How do I prepare myself?  - Please take 2 showers before surgery using Scrubcare or Hibiclens soap.    Use this soap only from the neck to your toes.     Leave the soap on your skin for one minute--then rinse thoroughly.      You may use your own shampoo and conditioner; no other hair products.   - Please remove all jewelry and body piercings.  - No lotions, deodorants or fragrance.  - No makeup or fingernail polish.   - Bring your ID and insurance card.    -If you have a Deep Brain Stimulator, Spinal Cord Stimulator or any neuro stimulator device---you must bring the remote control to the hospital     - All patients are required to have a Covid-19 test within 4 days of surgery/procedure.      -Patients will be contacted by the [facility] scheduling team within 1 week of surgery to make an appointment.      - Patients may call the Scheduling team at [phone] if they have not been scheduled within 4 days of  surgery.        Questions or Concerns:    - For any questions regarding the day of surgery or your hospital stay, please contact the Pre Admission Nursing Office at [phone].       - If you have health changes between today and your surgery please call your surgeon.       For questions after  surgery please call your surgeons office.

## 2022-01-18 NOTE — H&P
Pre-Operative H & P     CC:  Preoperative exam to assess for increased cardiopulmonary risk while undergoing surgery and anesthesia.    Date of Encounter: 1/18/2022  Primary Care Physician:  Maryana Brooks     Reason for visit:   Encounter Diagnoses   Name Primary?     Preop examination Yes     Rectal prolapse      Type 1 diabetes mellitus without complication (H)        HPI  Lynn Thompson is a 56 year old female who presents for pre-operative H & P in preparation for abdominal rectopexy, sigmoidoscopy,  supracervical hysterectomy, bilateral salpingooophrectomy, SACROCOLPOPEXY versus uterosacral ligament suspension, pina colposuspension, cystoscopy with Dr. Sheridan and Dr. Rivera on 2/1/22 at [facility].     This is a 56-year-old female with past medical history significant for obstructive sleep apnea, hypothyroidism, GERD, TPA I T 2013, diabetes on insulin, and anxiety.  She has bothersome rectal prolapse as well as uterovaginal prolapse.  She has been seen by both colon rectal surgery as well as urology and the combined above surgery is now planned.  She has a history of multiple abdominal procedures and was admitted to hospital in November 2021 for small bowel obstruction likely due to her adhesions.  No surgical intervention was necessary.    History is obtained from the patient and chart review      Hx of abnormal bleeding or anti-platelet use: denies    Menstrual history: Patient's last menstrual period was 12/19/2013.:     Prior to Admission Medications  Current Outpatient Medications   Medication Sig Dispense Refill     amylase-lipase-protease (VIOKACE) 84775-15837 units TABS tablet Take 4-5 with meals and 2 with snacks (Patient taking differently: Take 4-5 with big meal  2-3 with small meals and snacks) 500 tablet 11     calcium carbonate 600 mg-vitamin D 400 units (CALTRATE) 600-400 MG-UNIT per tablet Take 1 tablet by mouth daily       DULoxetine  (CYMBALTA) 20 MG capsule Take 20 mg by mouth daily       escitalopram (LEXAPRO) 20 MG tablet Take 20 mg by mouth daily       estradiol (ESTRACE) 0.1 MG/GM vaginal cream PLACE 2 GRAMS VAGINALLY 2 TIMES WEEKLY (Patient taking differently: PLACE 2 GRAMS VAGINALLY 2 TIMES WEEKLY  Thursdays and Sundays) 42.5 g 1     famotidine (PEPCID) 40 MG tablet Take 1 tablet (40 mg) by mouth 2 times daily as needed for heartburn 180 tablet 3     FIASP PENFILL 100 UNIT/ML SOCT Inject 0.5-4 Units Subcutaneous 4 times daily (with meals and nightly) (Patient taking differently: Inject 0.5-4 Units Subcutaneous 3 times daily ) 15 mL 5     gabapentin (NEURONTIN) 300 MG capsule Take 300 mg by mouth nightly as needed       hydrOXYzine (ATARAX) 25 MG tablet TAKE 1-2 TABLETS BY MOUTH 2 TIMES DAILY AS NEEDED FOR ANXIETY  0     insulin glargine (LANTUS PEN) 100 UNIT/ML pen Inject 6 units daily (Patient taking differently: Inject 5 Units Subcutaneous At Bedtime Inject 6 units daily) 15 mL 3     levothyroxine (SYNTHROID/LEVOTHROID) 100 MCG tablet Take 1 tablet (100 mcg) by mouth daily 90 tablet 3     loratadine (CLARITIN) 10 MG tablet Take 10 mg by mouth daily as needed        modafinil (PROVIGIL) 200 MG tablet Take 400 mg by mouth daily       omeprazole (PRILOSEC) 40 MG DR capsule Take 1 capsule (40 mg) by mouth 2 times daily Take 30-60 minutes before a meal. 180 capsule 3     traZODone (DESYREL) 50 MG tablet Take 50 mg by mouth nightly as needed for sleep       Continuous Blood Gluc  (DEXCOM G6 ) MODESTA Use as directed to check blood glucose levels 1 each 1     Continuous Blood Gluc Sensor (DEXCOM G6 SENSOR) MISC Change every 10 days 3 each 11     Continuous Blood Gluc Transmit (DEXCOM G6 TRANSMITTER) MISC Change every 3 months 1 each 3     Glucagon (GVOKE HYPOPEN 1-PACK) 1 MG/0.2ML SOAJ Inject 1 mg Subcutaneous once as needed (for hypoglycemia with loss of consciousness) 15 mL 5     glucose 40 % GEL Take 15-30 g by mouth every 15  "minutes as needed. 1 Tube 11     insulin pen needle (32G X 4 MM) 32G X 4 MM miscellaneous Use 6 pen needles daily 100 each 11     metroNIDAZOLE (FLAGYL) 500 MG tablet Take 1 tablet (500 mg) by mouth every 6 hours At 8:00 am, 2:00 pm, 8:00 pm the day prior to your surgery with neomycin and zofran. 3 tablet 0     neomycin (MYCIFRADIN) 500 MG tablet Take 2 tablets (1,000 mg) by mouth every 6 hours At 8:00 am, 2:00 pm, 8:00 pm the day prior to your surgery with flagyl and zofran. 6 tablet 0     ondansetron (ZOFRAN) 4 MG tablet Take 1 tablet (4 mg) by mouth every 6 hours At 8:00 am, 2:00 pm, 8:00 pm the day prior to your surgery with neomycin and flagyl. 3 tablet 0     polyethylene glycol (MIRALAX) 17 g packet Take 238 g by mouth See Admin Instructions Starting at 4 pm night prior to surgery. Refer to \"Getting Ready for Surgery\" instructions. 14 packet 0     STATIN NOT PRESCRIBED (INTENTIONAL) Please choose reason not prescribed from choices below.         Family History  Family History   Problem Relation Age of Onset     Family History Negative Mother      Respiratory Father         COPD;  at 69     Genitourinary Problems Father         kidney stones     Substance Abuse Father      Depression Father      Asthma Father      Heart Disease Paternal Grandfather         M.I.     Coronary Artery Disease Paternal Grandfather      Hyperlipidemia Paternal Grandfather      Genitourinary Problems Brother         multiple brothers with kidney stones     Gastrointestinal Disease Maternal Grandmother         undiagnosed 'gut' issues     Coronary Artery Disease Maternal Grandfather      Hyperlipidemia Maternal Grandfather      Cerebrovascular Disease Paternal Grandmother         At the age of 103     Anxiety Disorder Paternal Grandmother      Osteoporosis Paternal Grandmother      Anxiety Disorder Son      Anxiety Disorder Daughter      Asthma Daughter      Colon Cancer No family hx of            Anesthesia Pre-Procedure " Evaluation    Patient: Lynn Thompson   MRN: [id number] : 1965        Preoperative Diagnosis: * No surgery found *    Procedure :   PAC EVALUATION       Past Medical History:   Diagnosis Date     Benign paroxysmal positional vertigo     occ.      Calculus of kidney 05/2005    x1 on L side passed, several stones.  Has been tested for oxalate.     Chronic abdominal pain 2013     Chronic pain      Chronic pancreatitis 2013     Depression     also occ panic spells     Depressive disorder      Diabetes (H) 5/10/2013    Total Pancreatomy with Auto Islets Transplant     Dyspepsia 1999    H. pylori   treated     Headaches     still periodic HA's ;  often 5X/week     Hypertension 2016    Stress related     Iron deficiency anemia secondary to inadequate dietary iron intake 2003    relates to gastric bypass     Post-pancreatectomy diabetes melltius 2013     Sleep apnea     uses splint     Spasm of sphincter of Oddi     surgical + endoscopic stenting of pancreatic duct @ [facility] [place] 06     Thyroid nodule 2016     Vaccination not carried out       Past Surgical History:   Procedure Laterality Date     ABDOMINOPLASTY  2002    Tummy tuck     APPENDECTOMY  1990     BUNIONECTOMY Right 1998     CBD Stent placement  2002    CBD stent; Dr. Presley      SECTION       CHOLECYSTECTOMY       COLONOSCOPY   &     colonoscopy     COLONOSCOPY       COLONOSCOPY N/A 3/31/2021    Procedure: COLONOSCOPY INCOMPLETE Aborted due to incomplete prep  will need to take additional prep and return tomorrow 21;  Surgeon: Ihsan Saenz MD;  Location: [facility]     COMBINED CYSTOSCOPY, RETROGRADES, URETEROSCOPY, LASER HOLMIUM LITHOTRIPSY URETER(S), INSERT STENT Right 2015    Procedure: COMBINED CYSTOSCOPY, RETROGRADES, URETEROSCOPY, LASER HOLMIUM LITHOTRIPSY URETER(S), INSERT STENT;  Surgeon: Kennedi Aldana MD;  Location: [facility]     COMBINED CYSTOSCOPY,  RETROGRADES, URETEROSCOPY, LASER HOLMIUM LITHOTRIPSY URETER(S), INSERT STENT Right 04/20/2015    Procedure: COMBINED CYSTOSCOPY, RETROGRADES, URETEROSCOPY, LASER HOLMIUM LITHOTRIPSY URETER(S), INSERT STENT;  Surgeon: Kennedi Aldana MD;  Location: [facility]     COSMETIC SURGERY  6/24/2002    Tummy tuck     CYSTECTOMY OVARIAN BENIGN Right      CYSTOSCOPY, RETROGRADES, INSERT STENT URETER(S), COMBINED  10/02/2012    Procedure: COMBINED CYSTOSCOPY, RETROGRADES, INSERT STENT URETER(S);  COMBINED CYSTOSCOPY,  , INSERT LEFT STENT URETER;  Surgeon: Johny Baez MD;  Location: [facility]     ESOPHAGOSCOPY, GASTROSCOPY, DUODENOSCOPY (EGD), COMBINED N/A 01/24/2018    Procedure: COMBINED ESOPHAGOSCOPY, GASTROSCOPY, DUODENOSCOPY (EGD);  ESOPHAGOSCOPY, GASTROSCOPY, DUODENOSCOPY (EGD)    ;  Surgeon: Tamir Rodgers MD;  Location: [facility]     EXTRACORPOREAL SHOCK WAVE LITHOTRIPSY (ESWL)  10/16/2012    Procedure: EXTRACORPOREAL SHOCK WAVE LITHOTRIPSY (ESWL);  left EXTRACORPOREAL SHOCK WAVE LITHOTRIPSY (ESWL) ;  Surgeon: Johny Baez MD;  Location: [facility]     Gastric bypass NOS       HERNIA REPAIR  02/2015     IRRIGATION AND DEBRIDEMENT HAND, COMBINED Left 10/30/2020    Procedure: Left hand sharp excisional debridement of skin, subcutaneous tissue and fat with a scalpel, 2 x 1 x 1 cm.;  Surgeon: Demian Renteria MD;  Location: [facility]     LAP, LYSIS OF ADHESIONS       LAPAROSCOPIC LYSIS ADHESIONS N/A 02/20/2015    Procedure: LAPAROSCOPIC LYSIS ADHESIONS;  Surgeon: Aaron Early MD;  [facility]     LAPAROSCOPIC LYSIS ADHESIONS N/A 12/29/2015    Procedure: LAPAROSCOPIC LYSIS ADHESIONS;  Surgeon: Aaron Early MD;  [facility]     PANCREATECTOMY, TRANSPLANT AUTO ISLET CELL, COMBINED  05/10/2013    Procedure: COMBINED PANCREATECTOMY, TRANSPLANT AUTO ISLET CELL;  Pancreatectomy, Auto Islet Cell Transplant   hernia repair, jejunostomy tube and liver biopsies with Anesthesia General with block;  Surgeon: Aaron Early  MD Triny;  Location: [facility]     Partial ileum resection  1992     REPAIR PTOSIS BROW BILATERAL Bilateral 06/09/2020    Procedure: BILATERAL BROW PTOSIS REPAIR;  Surgeon: Denise Alberts MD;  Location:  OR     Surgery for SBO  2015     TONSILLECTOMY, ADENOIDECTOMY, COMBINED  1997     TRANSPLANT  5/10/13    Pancreatic Auto-Islet Transplant      Allergies   Allergen Reactions     Corticosteroids Other (See Comments)     All oral, IV and injectable steroids are contraindicated (unless in life threatening situations) in Islet Auto transplant recipients. They can cause irreversible loss of islet cell function. Please contact patient's transplant care coordinator, Erlinda Multani RN BSN at [phone]/pager: [phone] and endocrinologist prior to administration.       Povidone Iodine Hives     Causes skin to blister     Naproxen      Other reaction(s): Abdominal Pain  Pt allergic to Naprosyn     Nsaids      naprosyn = GI upset     Povidone Iodine      blisters     Sulfasalazine Nausea and Nausea and Vomiting      Social History     Tobacco Use     Smoking status: Never Smoker     Smokeless tobacco: Never Used   Substance Use Topics     Alcohol use: Not Currently     Alcohol/week: 0.0 standard drinks      Wt Readings from Last 1 Encounters:   12/02/21 50.8 kg (112 lb)        Anesthesia Evaluation   Pt has had prior anesthetic.     History of anesthetic complications  - PONV.  PONV after ovarian cyst removal in 1988, no problems since then.    The complete review of systems is negative other than noted in the HPI or here.     ROS/MED HX  ENT/Pulmonary:     (+) sleep apnea (uses a mouth splint, not CPAP),  (-) tobacco use, asthma and recent URI   Neurologic:  - neg neurologic ROS  (-) no seizures and no CVA   Cardiovascular:     (+) -----Previous cardiac testing   Echo: Date: Results:    Stress Test: Date: Results:    ECG Reviewed: Date: 6/5/20 Results:  SR  Cath: Date: Results:      METS/Exercise Tolerance:  >4 METS    Hematologic:  - neg hematologic  ROS  (-) history of blood clots and history of blood transfusion   Musculoskeletal: Comment: Chronic abdominal pain      GI/Hepatic:     (+) GERD, Asymptomatic on medication, bowel prep,     Renal/Genitourinary:     (+) Nephrolithiasis ,     Endo:     (+) type I DM, Last HgA1c: 6.5, date: 10/7/21, thyroid problem, hypothyroidism,     Psychiatric/Substance Use:     (+) psychiatric history anxiety     Infectious Disease:  - neg infectious disease ROS  (-) Recent Fever   Malignancy:  - neg malignancy ROS     Other:  - neg other ROS    (+) , H/O Chronic Pain,        Physical Exam    Airway  airway exam normal      Mallampati: I   TM distance: > 3 FB   Neck ROM: full   Mouth opening: > 3 cm    Respiratory Devices and Support         Dental  no notable dental history         Cardiovascular   cardiovascular exam normal       Rhythm and rate: regular and normal     Pulmonary   pulmonary exam normal        breath sounds clear to auscultation           OUTSIDE LABS:  CBC:   Lab Results   Component Value Date    WBC 11.0 11/26/2021    WBC 7.9 11/25/2021    HGB 11.8 11/26/2021    HGB 12.0 11/25/2021    HCT 38.2 11/26/2021    HCT 39.8 11/25/2021     11/26/2021     11/25/2021     BMP:   Lab Results   Component Value Date     11/26/2021     11/25/2021    POTASSIUM 3.8 11/26/2021    POTASSIUM 3.4 11/25/2021    CHLORIDE 111 (H) 11/26/2021    CHLORIDE 113 (H) 11/25/2021    CO2 31 11/26/2021    CO2 27 11/25/2021    BUN 9 11/26/2021    BUN 13 11/25/2021    CR 0.68 11/26/2021    CR 0.71 11/25/2021     (H) 11/26/2021    GLC 95 11/26/2021     COAGS:   Lab Results   Component Value Date    PTT 36 05/16/2013    INR 1.03 05/10/2016    FIBR 168 (L) 05/10/2013     POC:   Lab Results   Component Value Date     (H) 04/01/2021    HCG Negative 05/21/2014    HCGS Negative 12/30/2015     HEPATIC:   Lab Results   Component Value Date    ALBUMIN 2.5 (L) 11/26/2021     "PROTTOTAL 5.7 (L) 11/26/2021    ALT 39 11/26/2021    AST 23 11/26/2021    ALKPHOS 138 11/26/2021    BILITOTAL 0.3 11/26/2021     OTHER:   Lab Results   Component Value Date    PH 7.36 05/10/2013    LACT 0.9 11/23/2021    A1C 6.5 (H) 10/07/2021    VALARIE 8.6 11/26/2021    PHOS 2.9 01/01/2016    MAG 2.0 10/31/2019    LIPASE 10 (L) 11/23/2021    AMYLASE 268 (H) 05/11/2013    TSH 2.75 08/17/2021    T4 1.06 08/17/2021    T3 64 08/17/2021    CRP 21.8 (H) 10/30/2020    SED 13 10/30/2020         BP (!) 164/93 (BP Location: Right arm, Patient Position: Sitting, Cuff Size: Adult Regular)   Pulse 65   Temp 97.7  F (36.5  C) (Oral)   Resp 16   Ht 1.626 m (5' 4\")   Wt 51.4 kg (113 lb 6.4 oz)   LMP 12/19/2013   SpO2 99%   Breastfeeding No   BMI 19.47 kg/m           Physical Exam  Constitutional: Awake, alert, cooperative, no apparent distress, and appears stated age.  Eyes: Pupils equal, round and reactive to light, extra ocular muscles intact, sclera clear, conjunctiva normal.  HENT: Normocephalic, oral pharynx with moist mucus membranes, good dentition. No goiter appreciated.   Respiratory: Clear to auscultation bilaterally, no crackles or wheezing.  Cardiovascular: Regular rate and rhythm, normal S1 and S2, and no murmur noted.  Carotids +2, no bruits. No edema. Palpable pulses to radial and PT arteries.   GI: Normal bowel sounds, soft, non-distended, non-tender, no masses palpated  Lymph/Hematologic: No cervical lymphadenopathy and no supraclavicular lymphadenopathy.  Genitourinary:  deferred  Skin: Warm and dry.  No rashes at anticipated surgical site.   Musculoskeletal: Full ROM of neck. There is no redness, warmth, or swelling of the joints. Gross motor strength is normal.    Neurologic: Awake, alert, oriented to name, place and time. Cranial nerves II-XII are grossly intact. Gait is normal.   Neuropsychiatric: Calm, cooperative. Normal affect.     PRIOR LABS/DIAGNOSTIC STUDIES:   All labs and imaging personally " reviewed   Component      Latest Ref Rng & Units 1/18/2022   WBC      4.0 - 11.0 10e3/uL 6.1   RBC Count      3.80 - 5.20 10e6/uL 4.20   Hemoglobin      11.7 - 15.7 g/dL 12.2   Hematocrit      35.0 - 47.0 % 39.0   MCV      78 - 100 fL 93   MCH      26.5 - 33.0 pg 29.0   MCHC      31.5 - 36.5 g/dL 31.3 (L)   RDW      10.0 - 15.0 % 14.8   Platelet Count      150 - 450 10e3/uL 340     Component      Latest Ref Rng & Units 1/18/2022   Sodium      133 - 144 mmol/L 144   Potassium      3.4 - 5.3 mmol/L 3.7   Chloride      94 - 109 mmol/L 109   Carbon Dioxide      20 - 32 mmol/L 27   Anion Gap      3 - 14 mmol/L 8   Urea Nitrogen      7 - 30 mg/dL 22   Creatinine      0.52 - 1.04 mg/dL 0.73   Calcium      8.5 - 10.1 mg/dL 8.8   Glucose      70 - 99 mg/dL 123 (H)   GFR Estimate      >60 mL/min/1.73m2 >90         EKG/ stress test - if available please see in ROS above             Outside records reviewed from: care everywhere          ASSESSMENT and PLAN  Lynn Thompson is a 56 year old female scheduled for abdominal rectopexy, sigmoidoscopy,  supracervical hysterectomy, bilateral salpingooophrectomy, SACROCOLPOPEXY versus uterosacral ligament suspension, pina colposuspension, cystoscopy on 2/1/22 by Dr. Sheridan, and Dr. Rivera in treatment of rectal prolapse.  PAC referral for risk assessment and optimization for anesthesia with comorbid conditions of BLU, hypothyroidism, s/p TPAIT, diabetes, h/o Crohn's, GERD, anxiety, insomnia:     Pre-operative considerations:   1.  Cardiac:  Functional status- METS >4.  Good activity tolerance.  Intermediate risk surgery with 6.6% (RCRI # due to need for insulin and intraperitoneal surgery) risk of major adverse cardiac event.   --denies chest pain, SOB, MELTON, palpitations, orthopnea, edema  --no cardiac history.     2.  Pulm:  Airway feasible.     --BLU with nightly use of mouth splint  --non smoker     3.  GI:  Risk of PONV score = 4.  If > 2, anti-emetic intervention recommended. Has  h/o PONV.  --s/p TPAIT and splenectomy 2013.  S/p  choledochoduodenostomy, multiple procedures for ROSE MARY, gastric bypass, hernia repair   --remote h/o Crohn's s/p ileal resection 1992.  Remission   --admission 11/23/21 - 11/26/21 for SBO likely from significant adhesions. Improved with NGT, surgery not needed   --rectal prolapse progressing with above procedure planned     4.  Endo:   --hypothyroidism, continue levothyroxine   --diabetes, hold Novolog DOS, take 80% of usual dose of Lantus DOS.  --A1c today 6.8    5.  :   --urinary urgency   --uterovaginal prolapse; no vaginal bulge per chart review but poor apical support so thus combined surgical procedure as above.     6.  Neuro/Psych/Pain   --h/o insomnia continue trazodone and gabapentin at HS and hold Provigil DOS   --h/o anxiety continue Lexapro, hydroxyzine and Cymbalta      VTE risk: 0.26%    Patient is optimized and is acceptable candidate for the proposed procedure.  No further diagnostic evaluation is needed.           On the day of service:     Prep time: 10 minutes  Visit time: 13 minutes  Documentation time: 15 minutes  ------------------------------------------  Total time: 38 minutes      Sowmya Tesfaye PA-C  Preoperative Assessment Center  [facility]  Phone: [phone]  Fax: [phone]

## 2022-01-18 NOTE — ANESTHESIA PREPROCEDURE EVALUATION
Anesthesia Pre-Procedure Evaluation    Patient: Lynn Thompson   MRN: [id number] : 1965        Preoperative Diagnosis: * No surgery found *    Procedure :   PAC EVALUATION       Past Medical History:   Diagnosis Date     Benign paroxysmal positional vertigo     occ.      Calculus of kidney 05/2005    x1 on L side passed, several stones.  Has been tested for oxalate.     Chronic abdominal pain 2013     Chronic pain      Chronic pancreatitis 2013     Depression     also occ panic spells     Depressive disorder      Diabetes (H) 5/10/2013    Total Pancreatomy with Auto Islets Transplant     Dyspepsia 1999    H. pylori   treated     Headaches     still periodic HA's ;  often 5X/week     Hypertension 2016    Stress related     Iron deficiency anemia secondary to inadequate dietary iron intake 2003    relates to gastric bypass     Post-pancreatectomy diabetes melltius 2013     Sleep apnea     uses splint     Spasm of sphincter of Oddi     surgical + endoscopic stenting of pancreatic duct @ [facility] 06     Thyroid nodule 2016     Vaccination not carried out       Past Surgical History:   Procedure Laterality Date     ABDOMINOPLASTY  2002    Tummy tuck     APPENDECTOMY  1990     BUNIONECTOMY Right 1998     CBD Stent placement  2002    CBD stent; Dr. Presley      SECTION       CHOLECYSTECTOMY       COLONOSCOPY   &     colonoscopy     COLONOSCOPY       COLONOSCOPY N/A 3/31/2021    Procedure: COLONOSCOPY INCOMPLETE Aborted due to incomplete prep  will need to take additional prep and return tomorrow 21;  Surgeon: Ihsan Saenz MD;  Location: [facility]     COMBINED CYSTOSCOPY, RETROGRADES, URETEROSCOPY, LASER HOLMIUM LITHOTRIPSY URETER(S), INSERT STENT Right 2015    Procedure: COMBINED CYSTOSCOPY, RETROGRADES, URETEROSCOPY, LASER HOLMIUM LITHOTRIPSY URETER(S), INSERT STENT;  Surgeon: Kennedi Aldana MD;  Location: [facility]      COMBINED CYSTOSCOPY, RETROGRADES, URETEROSCOPY, LASER HOLMIUM LITHOTRIPSY URETER(S), INSERT STENT Right 04/20/2015    Procedure: COMBINED CYSTOSCOPY, RETROGRADES, URETEROSCOPY, LASER HOLMIUM LITHOTRIPSY URETER(S), INSERT STENT;  Surgeon: Kennedi lAdana MD;  Location: [facility]     COSMETIC SURGERY  6/24/2002    Tummy tuck     CYSTECTOMY OVARIAN BENIGN Right      CYSTOSCOPY, RETROGRADES, INSERT STENT URETER(S), COMBINED  10/02/2012    Procedure: COMBINED CYSTOSCOPY, RETROGRADES, INSERT STENT URETER(S);  COMBINED CYSTOSCOPY,  , INSERT LEFT STENT URETER;  Surgeon: Johny Baez MD;  Location: [facility]     ESOPHAGOSCOPY, GASTROSCOPY, DUODENOSCOPY (EGD), COMBINED N/A 01/24/2018    Procedure: COMBINED ESOPHAGOSCOPY, GASTROSCOPY, DUODENOSCOPY (EGD);  ESOPHAGOSCOPY, GASTROSCOPY, DUODENOSCOPY (EGD)    ;  Surgeon: Tamir Rodgers MD;  Location: [facility]     EXTRACORPOREAL SHOCK WAVE LITHOTRIPSY (ESWL)  10/16/2012    Procedure: EXTRACORPOREAL SHOCK WAVE LITHOTRIPSY (ESWL);  left EXTRACORPOREAL SHOCK WAVE LITHOTRIPSY (ESWL) ;  Surgeon: Johny Baez MD;  Location: [facility]     Gastric bypass NOS       HERNIA REPAIR  02/2015     IRRIGATION AND DEBRIDEMENT HAND, COMBINED Left 10/30/2020    Procedure: Left hand sharp excisional debridement of skin, subcutaneous tissue and fat with a scalpel, 2 x 1 x 1 cm.;  Surgeon: Demian Renteria MD;  Location: [facility]     LAP, LYSIS OF ADHESIONS       LAPAROSCOPIC LYSIS ADHESIONS N/A 02/20/2015    Procedure: LAPAROSCOPIC LYSIS ADHESIONS;  Surgeon: Aaron Early MD;  [facility]     LAPAROSCOPIC LYSIS ADHESIONS N/A 12/29/2015    Procedure: LAPAROSCOPIC LYSIS ADHESIONS;  Surgeon: Aaron Early MD;  [facility]     PANCREATECTOMY, TRANSPLANT AUTO ISLET CELL, COMBINED  05/10/2013    Procedure: COMBINED PANCREATECTOMY, TRANSPLANT AUTO ISLET CELL;  Pancreatectomy, Auto Islet Cell Transplant   hernia repair, jejunostomy tube and liver biopsies with Anesthesia General with block;   Surgeon: Aaron Early MD;  [facility]     Partial ileum resection  1992     REPAIR PTOSIS BROW BILATERAL Bilateral 06/09/2020    Procedure: BILATERAL BROW PTOSIS REPAIR;  Surgeon: Denise Alberts MD;  Location:  OR     Surgery for SBO  2015     TONSILLECTOMY, ADENOIDECTOMY, COMBINED  1997     TRANSPLANT  5/10/13    Pancreatic Auto-Islet Transplant      Allergies   Allergen Reactions     Corticosteroids Other (See Comments)     All oral, IV and injectable steroids are contraindicated (unless in life threatening situations) in Islet Auto transplant recipients. They can cause irreversible loss of islet cell function. Please contact patient's transplant care coordinator, Erlinda Multani RN BSN at [phone]/pager: [phone] and endocrinologist prior to administration.       Povidone Iodine Hives     Causes skin to blister     Naproxen      Other reaction(s): Abdominal Pain  Pt allergic to Naprosyn     Nsaids      naprosyn = GI upset     Povidone Iodine      blisters     Sulfasalazine Nausea and Nausea and Vomiting      Social History     Tobacco Use     Smoking status: Never Smoker     Smokeless tobacco: Never Used   Substance Use Topics     Alcohol use: Not Currently     Alcohol/week: 0.0 standard drinks      Wt Readings from Last 1 Encounters:   12/02/21 50.8 kg (112 lb)        Anesthesia Evaluation   Pt has had prior anesthetic.     History of anesthetic complications  - PONV.  PONV after ovarian cyst removal in 1988, no problems since then.    ROS/MED HX  ENT/Pulmonary:     (+) sleep apnea (uses a mouth splint, not CPAP),  (-) tobacco use, asthma and recent URI   Neurologic:  - neg neurologic ROS  (-) no seizures and no CVA   Cardiovascular:     (+) -----Previous cardiac testing   Echo: Date: Results:    Stress Test: Date: Results:    ECG Reviewed: Date: 6/5/20 Results:  SR  Cath: Date: Results:      METS/Exercise Tolerance: >4 METS    Hematologic:  - neg hematologic  ROS  (-) history of  blood clots and history of blood transfusion   Musculoskeletal: Comment: Chronic abdominal pain      GI/Hepatic:     (+) GERD, Asymptomatic on medication, bowel prep,     Renal/Genitourinary:     (+) Nephrolithiasis ,     Endo:     (+) type I DM, Last HgA1c: 6.5, date: 10/7/21, thyroid problem, hypothyroidism,     Psychiatric/Substance Use:     (+) psychiatric history anxiety     Infectious Disease:  - neg infectious disease ROS  (-) Recent Fever   Malignancy:  - neg malignancy ROS     Other:  - neg other ROS    (+) , H/O Chronic Pain,        Physical Exam    Airway  airway exam normal      Mallampati: I   TM distance: > 3 FB   Neck ROM: full   Mouth opening: > 3 cm    Respiratory Devices and Support         Dental  no notable dental history         Cardiovascular   cardiovascular exam normal       Rhythm and rate: regular and normal     Pulmonary   pulmonary exam normal        breath sounds clear to auscultation           OUTSIDE LABS:  CBC:   Lab Results   Component Value Date    WBC 11.0 11/26/2021    WBC 7.9 11/25/2021    HGB 11.8 11/26/2021    HGB 12.0 11/25/2021    HCT 38.2 11/26/2021    HCT 39.8 11/25/2021     11/26/2021     11/25/2021     BMP:   Lab Results   Component Value Date     11/26/2021     11/25/2021    POTASSIUM 3.8 11/26/2021    POTASSIUM 3.4 11/25/2021    CHLORIDE 111 (H) 11/26/2021    CHLORIDE 113 (H) 11/25/2021    CO2 31 11/26/2021    CO2 27 11/25/2021    BUN 9 11/26/2021    BUN 13 11/25/2021    CR 0.68 11/26/2021    CR 0.71 11/25/2021     (H) 11/26/2021    GLC 95 11/26/2021     COAGS:   Lab Results   Component Value Date    PTT 36 05/16/2013    INR 1.03 05/10/2016    FIBR 168 (L) 05/10/2013     POC:   Lab Results   Component Value Date     (H) 04/01/2021    HCG Negative 05/21/2014    HCGS Negative 12/30/2015     HEPATIC:   Lab Results   Component Value Date    ALBUMIN 2.5 (L) 11/26/2021    PROTTOTAL 5.7 (L) 11/26/2021    ALT 39 11/26/2021    AST 23  11/26/2021    ALKPHOS 138 11/26/2021    BILITOTAL 0.3 11/26/2021     OTHER:   Lab Results   Component Value Date    PH 7.36 05/10/2013    LACT 0.9 11/23/2021    A1C 6.5 (H) 10/07/2021    VALARIE 8.6 11/26/2021    PHOS 2.9 01/01/2016    MAG 2.0 10/31/2019    LIPASE 10 (L) 11/23/2021    AMYLASE 268 (H) 05/11/2013    TSH 2.75 08/17/2021    T4 1.06 08/17/2021    T3 64 08/17/2021    CRP 21.8 (H) 10/30/2020    SED 13 10/30/2020             PAC Discussion and Assessment    ASA Classification: 3  Case is suitable for: [place]  Anesthetic techniques and relevant risks discussed: GA  Invasive monitoring and risk discussed: No    Possibility and Risk of blood transfusion discussed: Yes            PAC Resident/NP Anesthesia Assessment: Lynn Thompson is a 56 year old female scheduled for abdominal rectopexy, sigmoidoscopy,  supracervical hysterectomy, bilateral salpingooophrectomy, SACROCOLPOPEXY versus uterosacral ligament suspension, pina colposuspension, cystoscopy on 2/1/22 by Dr. Sheridan, and Dr. Rivera in treatment of rectal prolapse.  PAC referral for risk assessment and optimization for anesthesia with comorbid conditions of BLU, hypothyroidism, h/o Crohn's, GERD, anxiety, insomnia:     Pre-operative considerations:   1.  Cardiac:  Functional status- METS >4.  Good activity tolerance.  Intermediate risk surgery with 6.6% (RCRI # due to need for insulin and intraperitoneal surgery) risk of major adverse cardiac event.   --denies chest pain, SOB, MELTON, palpitations, orthopnea, edema  --no cardiac history.     2.  Pulm:  Airway feasible.     --BLU with nightly use of mouth splint  --non smoker     3.  GI:  Risk of PONV score = 4.  If > 2, anti-emetic intervention recommended. Has h/o PONV.  --s/p TPAIT and splenectomy 2013.  S/p  choledochoduodenostomy, multiple procedures for ROSE MARY, gastric bypass, hernia repair   --remote h/o Crohn's s/p ileal resection 1992.  Remission   --admission 11/23/21 - 11/26/21 for SBO likely from  significant adhesions. Improved with NGT, surgery not needed   --rectal prolapse progressing with above procedure planned     4.  Endo:   --hypothyroidism, continue levothyroxine   --diabetes, hold Novolog DOS, take 80% of usual dose of Lantus DOS.  --A1c today 6.8    5.  :   --urinary urgency   --uterovaginal prolapse; no vaginal bulge per chart review but poor apical support so thus combined surgical procedure as above.     6.  Neuro/Psych/Pain   --h/o insomnia continue trazodone and gabapentin at HS and hold Provigil DOS   --h/o anxiety continue Lexapro, hydroxyzine and Cymbalta      VTE risk: 0.26%    Patient is optimized and is acceptable candidate for the proposed procedure.  No further diagnostic evaluation is needed.         **For further details of assessment, testing, and physical exam please see H and P completed on same date.           Sowmya Tesfaye PA-C, [facility]    Reviewed and Signed by PAC Mid-Level Provider/Resident  Mid-Level Provider/Resident: Sowmya Tesfaye  Date: 1/18/22                                 Sowmya Tesfaye PA-C

## 2022-01-19 DIAGNOSIS — Z11.59 ENCOUNTER FOR SCREENING FOR OTHER VIRAL DISEASES: Primary | ICD-10-CM

## 2022-01-20 ENCOUNTER — TEAM CONFERENCE (OUTPATIENT)
Dept: SURGERY | Facility: CLINIC | Age: 57
End: 2022-01-20
Payer: MEDICARE

## 2022-01-20 ENCOUNTER — TELEPHONE (OUTPATIENT)
Dept: SURGERY | Facility: CLINIC | Age: 57
End: 2022-01-20
Payer: MEDICARE

## 2022-01-20 NOTE — TELEPHONE ENCOUNTER
"On 11/5/2022:  Dr. Rivera's  Loly spoke with patient, completed the following scheduling (I scheduled the appointments with [facility]).    On 1/20/2022:  I sent Surgery Packet to patient via MyChart and Mail including related scheduling information and prep instructions:    Your surgery is scheduled:    Date: Tuesday February 1, 2022  ________________________________    Time: 8:30 AM*  ________________________________    Please arrive at:  6:30 AM*  ______________________    Surgeon's Name:   - Dr. Uriah Sheridan  - Dr. Nicole Rivera  _______________________        Pre-Op Physical Fax Numbers:         [facility]  [facility] Fax:  [phone] / Phone:  [phone]        Your surgery is located at:      [facility]      [employer]      [address])      [place], [place] [address] [phone]      [email or web address]     *Times are tentative and may change. You can expect a call from the pre-admission nurses to confirm arrival and surgery start times if the times should change.     Required: Yes, you will need a  18 years or older to drive you home from your procedure as well as stay with you for 24 hours after your procedure, if you are discharged the same day as your procedure.    Surgery: Rectopexy, Abdominal, Flexible Sigmoidoscopy; Supracervical Hysterectomy, Bilateral Salpingoophorectomy, Sacrocolpopexy versus Uterosacral Ligament Suspension, Gan Colposuspension, Cystoscopy    Pre-surgical Preparation:       MiraLAX/Gatorade, Magnesium Citrate, Antibiotics, Zofran  - see \"Day Before Surgery\"  - obtain medications and ingredients in advance  - if you have diabetes or blood sugar concerns, please use Gatorade ZERO or Low Sugar, as per recommendation by your physician    "

## 2022-01-20 NOTE — PROGRESS NOTES
COLON AND RECTAL SURGERY HUDDLE:    Patient was reviewed in preporation for their surgery the following was reviewed and has been completed:    Surgeon: Dr. Uriah Sheridan    Surgery & Date: 2/1  Rectopexy, Abdominal, Flexible Sigmoidoscopy; Supracervical Hysterectomy, Bilateral Salpingoophorectomy, Sacrocolpopexy versus Uterosacral Ligament Suspension, Gan Colposuspension, Cystoscopy    Last MD Note: reviewed    Anesthesia Type: General    Other Providers: Yes    PAC: Yes    WOC: No    Labs: Yes    Bowel Prep: Yes MiraLAX / Gatorade , Antibiotic and Magnesium Citrate    Packet: Yes    Imaging: N/A    Post-Op Appointments: Yes    COVID: Yes 1/28

## 2022-01-26 ENCOUNTER — PATIENT OUTREACH (OUTPATIENT)
Dept: UROLOGY | Facility: CLINIC | Age: 57
End: 2022-01-26
Payer: MEDICARE

## 2022-01-26 ENCOUNTER — PRE VISIT (OUTPATIENT)
Dept: UROLOGY | Facility: CLINIC | Age: 57
End: 2022-01-26
Payer: MEDICARE

## 2022-01-26 NOTE — TELEPHONE ENCOUNTER
Notified patient she needs to come in to speak with her surgeon prior to the surgery on Tuesday. Patient agreed to come in tomorrow at 845 am. I will have the patient complete the prolapse forms also.    Charmaine Navarro RN, BSN  Care Coordinator Urology

## 2022-01-26 NOTE — TELEPHONE ENCOUNTER
Reason for visit: discuss surgical plan and sign paperwork     Relevant information: prolapse, joint case     Records/imaging/labs/orders: all records available    Pt called: no need for a call    At Rooming: standard

## 2022-01-27 ENCOUNTER — OFFICE VISIT (OUTPATIENT)
Dept: UROLOGY | Facility: CLINIC | Age: 57
End: 2022-01-27
Payer: COMMERCIAL

## 2022-01-27 VITALS
HEIGHT: 64 IN | DIASTOLIC BLOOD PRESSURE: 68 MMHG | BODY MASS INDEX: 18.44 KG/M2 | WEIGHT: 108 LBS | HEART RATE: 66 BPM | SYSTOLIC BLOOD PRESSURE: 138 MMHG

## 2022-01-27 DIAGNOSIS — Z90.410 POST-PANCREATECTOMY DIABETES (H): ICD-10-CM

## 2022-01-27 DIAGNOSIS — N81.4 UTEROVAGINAL PROLAPSE, UNSPECIFIED: ICD-10-CM

## 2022-01-27 DIAGNOSIS — E89.1 POST-PANCREATECTOMY DIABETES (H): ICD-10-CM

## 2022-01-27 DIAGNOSIS — Z98.84 GASTRIC BYPASS STATUS FOR OBESITY: ICD-10-CM

## 2022-01-27 DIAGNOSIS — Z98.890 S/P EXPLORATORY LAPAROTOMY: ICD-10-CM

## 2022-01-27 DIAGNOSIS — E13.9 POST-PANCREATECTOMY DIABETES (H): ICD-10-CM

## 2022-01-27 DIAGNOSIS — K62.3 RECTAL PROLAPSE: Primary | ICD-10-CM

## 2022-01-27 PROCEDURE — 99214 OFFICE O/P EST MOD 30 MIN: CPT | Performed by: UROLOGY

## 2022-01-27 ASSESSMENT — PAIN SCALES - GENERAL: PAINLEVEL: NO PAIN (0)

## 2022-01-27 ASSESSMENT — MIFFLIN-ST. JEOR: SCORE: 1054.88

## 2022-01-27 NOTE — LETTER
1/27/2022       RE: Lynn Thompson  [address] [address]     Dear Colleague,    Thank you for referring your patient, Lynn Thompson, to the [facility] [place] [facility]. Please see a copy of my visit note below.      Assessment & Plan     Rectal prolapse  Plan for rectoplexy with Dr. Sheridan on 2/1/22.    Uterovaginal prolapse, unspecified  On defecography and exam, has enterocele and poor apical support. Plan for open supracervical hysterectomy and sacrocolpoplexy using mesh. Reviewed risks for the procedure including risks of anesthesia, trauma to bowel, bladder, and surrounding tissues, bleeding, and infection.  Patient expressed understanding and is agreeable to preceding with the procedure.     Additionally, given that the patient is post-menopausal we discussed prophylactic bilateral oophorectomy. We discussed that after menopause, the ovaries are thought to produce a hormones and provide some benefits for cardiovascular and bone health. We also reviewed risks for not removing the ovaries including risk for future ovarian cancer and complex surgical history that could make future removal more complex. Pt elected to have bilateral oophorectomy if possible.    We also reviewed that ACOG recommends bilateral salpingectomy and patient was agreeable if possible.     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 Discussed that given her multiple prior surgeries and pancreatectomy with transplant that she is at much higher risk for poor wound healing and complications.    Stress Incontinence  Current mild stress incontinence but will have increased risk of stress incontinence following prolapse repair. Plan for concomitant pina  colpossuspension versus fascial sling. Reviewed risks and benefits of both procedures including increased risk of failure with pina colpossuspension and increased risks for infection and delayed healing. Due to concomitant rectoplexy, there would be increased risk for infection if taking a vaginal approach for surgical repair for JESSICA.      Urinary Urgency  Reviewed that bladder sling procedure may not affect urge incontinence.      Plan for abdominal supracervical hysterectomy with bilateral salpingo-oophorectomy, open sacrocolpoplexy versus USLS, and possible pina colposuscpension versus fascial sling, cystoscopy on 2/1/22.  Again discussed with patient and her  that given patient's prior surgeries, DM and crohns that she is at much higher risk for surgical complications.     Liliya Humphreys, MS4    Attestation:  I agree with the PFSH and ROS as completed by the MS, except for changes made as needed.  The remainder of the encounter was performed by me and scribed by the MS.  The scribed note accurately reflects my personal services and the decisions made by me.    We discussed that the risks to the procedure include but not limited to cardiovascular risks of anesthesia, nerve injury, bleeding, infection, injury to adjacent organs including bowel, bladder, and blood vessels, de madison or worsening stress incontinence or urge incontinence, need for post-operative keen catheter, need for further procedures including for recurrence, mesh or suture extrusion or erosion.  Patient expressed understanding and agreeable to proceed.    25 minutes were spent today on the day of the encounter in reviewing the EMR, direct patient care and surgical counseling, coordination of care and documentation    Nicole Rivera MD MPH  (she/her/hers)   of Urology  [facility]      Tyesha Bailey is a 56 year old who presents for the following health issues rectal prolapse, rectocele, enterocele,  "and uterovaginal prolapse.    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 She presents for surgical planning and pelvic examination. She has mild urinary leakage wth coughing, sneezing, and laughing. She also has urge incontinence with a small amount of urine leakage with urgency. No vaginal bulge, pressure, abnormal uterine bleeding or vaginal discharge.     Review of Systems   Constitutional, HEENT, cardiovascular, pulmonary, GI, , musculoskeletal, neuro, skin, endocrine and psych systems are negative, except as otherwise noted.      Objective    /68   Pulse 66   Ht 1.626 m (5' 4\")   Wt 49 kg (108 lb)   LMP 12/19/2013   BMI 18.54 kg/m    Body mass index is 18.54 kg/m .  Physical Exam   GENERAL: healthy, alert and no distress  EYES: Eyes grossly normal to inspection, conjunctivae and sclerae normal  HENT: normal cephalic/atraumatic.  External ears, nose and mouth without ulcers or lesions.  RESP: no audible wheeze, cough, or visible cyanosis.  No visible retractions or increased work of breathing.  Able to speak fully in complete sentences.  NEURO: Cranial nerves grossly intact, mentation intact and speech normal  PSYCH: mentation appears normal, affect normal/bright, judgement and insight intact, normal speech and appearance well-groomed  ABD: surgical incisions consistent with her prior surgeries.  She has a large midline and a large transverse from a prior abominoplasty.  Non tender and no masses or organomegaly  :normal external genitalia, anterior prolapse to 0, apex lowers to shelter down TVL (9) with apex ~-4. High tone pelvic floor musculature    CC  Patient Care Team:  Maryana Brooks MD as PCP - General (Family Practice)  Ana Davis MD as MD (Pulmonary Disease)  Bartolome Disla MD as MD (Neurology)  Rina Watt  Renard, " Domonique HAILE NP as Nurse Practitioner (Nurse Practitioner Psych/Mental Health)  Adriana Robles MD as Assigned Pediatric Specialist Provider  Maryana Brooks MD as Assigned PCP  Kavitha Spencer DO as Assigned OBGYN Provider  Alicia Toscano, RN as Personal Advocate & Liaison (PAL) (Family Medicine)  Sabrina Rutledge, MITZY CNP as Nurse Practitioner (Colon & Rectal)  Liliya Urrutia MD as Assigned Cancer Care Provider  Nicole Rivera MD as MD (Urology)  Nicole Rivera MD as MD (Urology)  Charmaine Navarro, RN as Specialty Care Coordinator (Urology)  Charmaine Navarro, RN as Specialty Care Coordinator (Urology)  Maryana Brooks MD as Referring Physician (Family Medicine)  Nicole Rivera MD as Assigned Surgical Provider  SELF, REFERRED

## 2022-01-27 NOTE — PROGRESS NOTES
Assessment & Plan     Rectal prolapse  Plan for rectoplexy with Dr. Sheridan on 2/1/22.    Uterovaginal prolapse, unspecified  On defecography and exam, has enterocele and poor apical support. Plan for open supracervical hysterectomy and sacrocolpoplexy using mesh. Reviewed risks for the procedure including risks of anesthesia, trauma to bowel, bladder, and surrounding tissues, bleeding, and infection.  Patient expressed understanding and is agreeable to preceding with the procedure.     Additionally, given that the patient is post-menopausal we discussed prophylactic bilateral oophorectomy. We discussed that after menopause, the ovaries are thought to produce a hormones and provide some benefits for cardiovascular and bone health. We also reviewed risks for not removing the ovaries including risk for future ovarian cancer and complex surgical history that could make future removal more complex. Pt elected to have bilateral oophorectomy if possible.    We also reviewed that ACOG recommends bilateral salpingectomy and patient was agreeable if possible.     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 Discussed that given her multiple prior surgeries and pancreatectomy with transplant that she is at much higher risk for poor wound healing and complications.    Stress Incontinence  Current mild stress incontinence but will have increased risk of stress incontinence following prolapse repair. Plan for concomitant pina colpossuspension versus fascial sling. Reviewed risks and benefits of both procedures including increased risk of failure with pina colpossuspension and increased risks for infection and delayed healing. Due to concomitant rectoplexy, there would be increased risk for infection if taking a vaginal approach for  surgical repair for JESSICA.      Urinary Urgency  Reviewed that bladder sling procedure may not affect urge incontinence.      Plan for abdominal supracervical hysterectomy with bilateral salpingo-oophorectomy, open sacrocolpoplexy versus USLS, and possible pina colposuscpension versus fascial sling, cystoscopy on 2/1/22.  Again discussed with patient and her  that given patient's prior surgeries, DM and crohns that she is at much higher risk for surgical complications.     Liliya Humphreys, MS4    Attestation:  I agree with the PFSH and ROS as completed by the MS, except for changes made as needed.  The remainder of the encounter was performed by me and scribed by the MS.  The scribed note accurately reflects my personal services and the decisions made by me.    We discussed that the risks to the procedure include but not limited to cardiovascular risks of anesthesia, nerve injury, bleeding, infection, injury to adjacent organs including bowel, bladder, and blood vessels, de madison or worsening stress incontinence or urge incontinence, need for post-operative keen catheter, need for further procedures including for recurrence, mesh or suture extrusion or erosion.  Patient expressed understanding and agreeable to proceed.    25 minutes were spent today on the day of the encounter in reviewing the EMR, direct patient care and surgical counseling, coordination of care and documentation    Nicole Rivera MD MPH  (she/her/hers)   of Urology  [facility]      Tyesha Bailey is a 56 year old who presents for the following health issues rectal prolapse, rectocele, enterocele, and uterovaginal prolapse.    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 She presents for surgical planning and pelvic examination. She has mild urinary leakage wth  "coughing, sneezing, and laughing. She also has urge incontinence with a small amount of urine leakage with urgency. No vaginal bulge, pressure, abnormal uterine bleeding or vaginal discharge.     Review of Systems   Constitutional, HEENT, cardiovascular, pulmonary, GI, , musculoskeletal, neuro, skin, endocrine and psych systems are negative, except as otherwise noted.      Objective    /68   Pulse 66   Ht 1.626 m (5' 4\")   Wt 49 kg (108 lb)   LMP 12/19/2013   BMI 18.54 kg/m    Body mass index is 18.54 kg/m .  Physical Exam   GENERAL: healthy, alert and no distress  EYES: Eyes grossly normal to inspection, conjunctivae and sclerae normal  HENT: normal cephalic/atraumatic.  External ears, nose and mouth without ulcers or lesions.  RESP: no audible wheeze, cough, or visible cyanosis.  No visible retractions or increased work of breathing.  Able to speak fully in complete sentences.  NEURO: Cranial nerves grossly intact, mentation intact and speech normal  PSYCH: mentation appears normal, affect normal/bright, judgement and insight intact, normal speech and appearance well-groomed  ABD: surgical incisions consistent with her prior surgeries.  She has a large midline and a large transverse from a prior abominoplasty.  Non tender and no masses or organomegaly  :normal external genitalia, anterior prolapse to 0, apex lowers to CHCF down TVL (9) with apex ~-4. High tone pelvic floor musculature    CC  Patient Care Team:  Maryana Brooks MD as PCP - General (Family Practice)  Ana Davis MD as MD (Pulmonary Disease)  Bartolome Disla MD as MD (Neurology)  Rina Watt Amanda L, NP as Nurse Practitioner (Nurse Practitioner Psych/Mental Health)  Adriana Robles MD as Assigned Pediatric Specialist Provider  Maryana Brooks MD as Assigned PCP  Kavitha Spencer DO as Assigned OBGYN Provider  Alicia Toscano RN as Personal Advocate & Liaison (PAL) (Family Medicine)  von " Sabrina Buchanan, MITZY CNP as Nurse Practitioner (Colon & Rectal)  Liliya Urrutia MD as Assigned Cancer Care Provider  Nicole Rivera MD as MD (Urology)  Nicole Rivera MD as MD (Urology)  Charmaine Navarro, RN as Specialty Care Coordinator (Urology)  Charmaine Navarro, RN as Specialty Care Coordinator (Urology)  Maryana Brooks MD as Referring Physician (Family Medicine)  Nicole Rivera MD as Assigned Surgical Provider  SELF, REFERRED

## 2022-01-27 NOTE — NURSING NOTE
"Chief Complaint   Patient presents with     RECHECK     Discuss surgery       Blood pressure 138/68, pulse 66, height 1.626 m (5' 4\"), weight 49 kg (108 lb), last menstrual period 12/19/2013, not currently breastfeeding. Body mass index is 18.54 kg/m .    Patient Active Problem List   Diagnosis     Iron deficiency anemia secondary to inadequate dietary iron intake     Gastric bypass status for obesity     Health Care Home     Chronic pain syndrome     Exocrine pancreatic insufficiency     Asplenia     BLU (obstructive sleep apnea)     S/P exploratory laparotomy     Dysthymia     Anxiety     Thyroid nodule     Acquired hypothyroidism     Post-pancreatectomy diabetes (H)     Other iron deficiency anemia     Uterovaginal prolapse, unspecified     Rectal prolapse     Small bowel obstruction (H)     Acute kidney injury (H)       Allergies   Allergen Reactions     Corticosteroids Other (See Comments)     All oral, IV and injectable steroids are contraindicated (unless in life threatening situations) in [employer] transplant recipients. They can cause irreversible loss of islet cell function. Please contact patient's transplant care coordinator, Erlinda Multani RN BSN at [phone]/pager: [phone] and endocrinologist prior to administration.       Povidone Iodine Hives     Causes skin to blister     Naproxen      Other reaction(s): Abdominal Pain  Pt allergic to Naprosyn     Nsaids      naprosyn = GI upset     Povidone Iodine      blisters     Sulfasalazine Nausea and Nausea and Vomiting       Current Outpatient Medications   Medication Sig Dispense Refill     amylase-lipase-protease (VIOKACE) 32309-33997 units TABS tablet Take 4-5 with meals and 2 with snacks (Patient taking differently: Take 4-5 with big meal  2-3 with small meals and snacks) 500 tablet 11     calcium carbonate 600 mg-vitamin D 400 units (CALTRATE) 600-400 MG-UNIT per tablet Take 1 tablet by mouth daily       Continuous Blood Gluc  " (DEXCOM G6 ) MODESTA Use as directed to check blood glucose levels 1 each 1     Continuous Blood Gluc Sensor (DEXCOM G6 SENSOR) MISC Change every 10 days 3 each 11     Continuous Blood Gluc Transmit (DEXCOM G6 TRANSMITTER) MISC Change every 3 months 1 each 3     DULoxetine (CYMBALTA) 20 MG capsule Take 20 mg by mouth daily       escitalopram (LEXAPRO) 20 MG tablet Take 20 mg by mouth daily       estradiol (ESTRACE) 0.1 MG/GM vaginal cream PLACE 2 GRAMS VAGINALLY 2 TIMES WEEKLY (Patient taking differently: PLACE 2 GRAMS VAGINALLY 2 TIMES WEEKLY  Thursdays and Sundays) 42.5 g 1     famotidine (PEPCID) 40 MG tablet Take 1 tablet (40 mg) by mouth 2 times daily as needed for heartburn 180 tablet 3     FIASP PENFILL 100 UNIT/ML SOCT Inject 0.5-4 Units Subcutaneous 4 times daily (with meals and nightly) (Patient taking differently: Inject 0.5-4 Units Subcutaneous 3 times daily ) 15 mL 5     gabapentin (NEURONTIN) 300 MG capsule Take 300 mg by mouth nightly as needed       Glucagon (GVOKE HYPOPEN 1-PACK) 1 MG/0.2ML SOAJ Inject 1 mg Subcutaneous once as needed (for hypoglycemia with loss of consciousness) 15 mL 5     glucose 40 % GEL Take 15-30 g by mouth every 15 minutes as needed. 1 Tube 11     hydrOXYzine (ATARAX) 25 MG tablet TAKE 1-2 TABLETS BY MOUTH 2 TIMES DAILY AS NEEDED FOR ANXIETY  0     insulin glargine (LANTUS PEN) 100 UNIT/ML pen Inject 6 units daily (Patient taking differently: Inject 5 Units Subcutaneous At Bedtime Inject 6 units daily) 15 mL 3     insulin pen needle (32G X 4 MM) 32G X 4 MM miscellaneous Use 6 pen needles daily 100 each 11     levothyroxine (SYNTHROID/LEVOTHROID) 100 MCG tablet Take 1 tablet (100 mcg) by mouth daily 90 tablet 3     loratadine (CLARITIN) 10 MG tablet Take 10 mg by mouth daily as needed        metroNIDAZOLE (FLAGYL) 500 MG tablet Take 1 tablet (500 mg) by mouth every 6 hours At 8:00 am, 2:00 pm, 8:00 pm the day prior to your surgery with neomycin and zofran. 3 tablet 0      "modafinil (PROVIGIL) 200 MG tablet Take 400 mg by mouth daily       neomycin (MYCIFRADIN) 500 MG tablet Take 2 tablets (1,000 mg) by mouth every 6 hours At 8:00 am, 2:00 pm, 8:00 pm the day prior to your surgery with flagyl and zofran. 6 tablet 0     omeprazole (PRILOSEC) 40 MG DR capsule Take 1 capsule (40 mg) by mouth 2 times daily Take 30-60 minutes before a meal. 180 capsule 3     ondansetron (ZOFRAN) 4 MG tablet Take 1 tablet (4 mg) by mouth every 6 hours At 8:00 am, 2:00 pm, 8:00 pm the day prior to your surgery with neomycin and flagyl. 3 tablet 0     polyethylene glycol (MIRALAX) 17 g packet Take 238 g by mouth See Admin Instructions Starting at 4 pm night prior to surgery. Refer to \"Getting Ready for Surgery\" instructions. 14 packet 0     STATIN NOT PRESCRIBED (INTENTIONAL) Please choose reason not prescribed from choices below.       traZODone (DESYREL) 50 MG tablet Take 50 mg by mouth nightly as needed for sleep         Social History     Tobacco Use     Smoking status: Never Smoker     Smokeless tobacco: Never Used   Substance Use Topics     Alcohol use: Not Currently     Alcohol/week: 0.0 standard drinks     Drug use: Not Currently       Ladan Vitale CMA  1/27/2022  9:20 AM     "

## 2022-01-28 ENCOUNTER — LAB (OUTPATIENT)
Dept: LAB | Facility: CLINIC | Age: 57
End: 2022-01-28
Payer: COMMERCIAL

## 2022-01-28 DIAGNOSIS — Z11.59 ENCOUNTER FOR SCREENING FOR OTHER VIRAL DISEASES: ICD-10-CM

## 2022-01-28 PROCEDURE — U0003 INFECTIOUS AGENT DETECTION BY NUCLEIC ACID (DNA OR RNA); SEVERE ACUTE RESPIRATORY SYNDROME CORONAVIRUS 2 (SARS-COV-2) (CORONAVIRUS DISEASE [COVID-19]), AMPLIFIED PROBE TECHNIQUE, MAKING USE OF HIGH THROUGHPUT TECHNOLOGIES AS DESCRIBED BY CMS-2020-01-R: HCPCS

## 2022-01-28 PROCEDURE — U0005 INFEC AGEN DETEC AMPLI PROBE: HCPCS

## 2022-01-29 LAB — SARS-COV-2 RNA RESP QL NAA+PROBE: NEGATIVE

## 2022-01-31 ENCOUNTER — ANESTHESIA EVENT (OUTPATIENT)
Dept: SURGERY | Facility: CLINIC | Age: 57
End: 2022-01-31
Payer: COMMERCIAL

## 2022-02-01 ENCOUNTER — ANESTHESIA (OUTPATIENT)
Dept: SURGERY | Facility: CLINIC | Age: 57
End: 2022-02-01
Payer: COMMERCIAL

## 2022-02-01 ENCOUNTER — HOSPITAL ENCOUNTER (INPATIENT)
Facility: CLINIC | Age: 57
LOS: 4 days | Discharge: HOME | End: 2022-02-05
Attending: COLON & RECTAL SURGERY | Admitting: COLON & RECTAL SURGERY
Payer: COMMERCIAL

## 2022-02-01 DIAGNOSIS — K62.3 RECTAL PROLAPSE: Primary | ICD-10-CM

## 2022-02-01 LAB
ANION GAP SERPL CALCULATED.3IONS-SCNC: 9 MMOL/L (ref 3–14)
BASE EXCESS BLDV CALC-SCNC: 0.8 MMOL/L (ref -8.1–1.9)
BUN SERPL-MCNC: 18 MG/DL (ref 7–30)
CA-I BLD-MCNC: 4.8 MG/DL (ref 4.4–5.2)
CALCIUM SERPL-MCNC: 8.4 MG/DL (ref 8.5–10.1)
CHLORIDE BLD-SCNC: 110 MMOL/L (ref 94–109)
CO2 SERPL-SCNC: 24 MMOL/L (ref 20–32)
CREAT SERPL-MCNC: 0.74 MG/DL (ref 0.52–1.04)
GFR SERPL CREATININE-BSD FRML MDRD: >90 ML/MIN/1.73M2
GLUCOSE BLD-MCNC: 142 MG/DL (ref 70–99)
GLUCOSE BLD-MCNC: 90 MG/DL (ref 70–99)
GLUCOSE BLDC GLUCOMTR-MCNC: 124 MG/DL (ref 70–99)
GLUCOSE BLDC GLUCOMTR-MCNC: 129 MG/DL (ref 70–99)
GLUCOSE BLDC GLUCOMTR-MCNC: 157 MG/DL (ref 70–99)
GLUCOSE BLDC GLUCOMTR-MCNC: 197 MG/DL (ref 70–99)
GLUCOSE BLDC GLUCOMTR-MCNC: 78 MG/DL (ref 70–99)
GLUCOSE BLDC GLUCOMTR-MCNC: 89 MG/DL (ref 70–99)
HCO3 BLDV-SCNC: 25 MMOL/L (ref 21–28)
HGB BLD-MCNC: 11.4 G/DL (ref 11.7–15.7)
LACTATE BLD-SCNC: 1.3 MMOL/L
MAGNESIUM SERPL-MCNC: 2.2 MG/DL (ref 1.6–2.3)
O2/TOTAL GAS SETTING VFR VENT: 40 %
OXYHGB MFR BLDV: 90 % (ref 92–100)
PCO2 BLDV: 39 MM HG (ref 40–50)
PH BLDV: 7.42 [PH] (ref 7.32–7.43)
PHOSPHATE SERPL-MCNC: 4.2 MG/DL (ref 2.5–4.5)
PO2 BLDV: 63 MM HG (ref 25–47)
POTASSIUM BLD-SCNC: 3.3 MMOL/L (ref 3.5–5)
POTASSIUM BLD-SCNC: 3.6 MMOL/L (ref 3.4–5.3)
POTASSIUM BLD-SCNC: 4 MMOL/L (ref 3.4–5.3)
SODIUM BLD-SCNC: 139 MMOL/L (ref 133–144)
SODIUM SERPL-SCNC: 143 MMOL/L (ref 133–144)

## 2022-02-01 PROCEDURE — 710N000010 HC RECOVERY PHASE 1, LEVEL 2, PER MIN: Performed by: COLON & RECTAL SURGERY

## 2022-02-01 PROCEDURE — 370N000017 HC ANESTHESIA TECHNICAL FEE, PER MIN: Performed by: COLON & RECTAL SURGERY

## 2022-02-01 PROCEDURE — 82805 BLOOD GASES W/O2 SATURATION: CPT

## 2022-02-01 PROCEDURE — 999N000141 HC STATISTIC PRE-PROCEDURE NURSING ASSESSMENT: Performed by: COLON & RECTAL SURGERY

## 2022-02-01 PROCEDURE — 250N000025 HC SEVOFLURANE, PER MIN: Performed by: COLON & RECTAL SURGERY

## 2022-02-01 PROCEDURE — 250N000011 HC RX IP 250 OP 636: Performed by: STUDENT IN AN ORGANIZED HEALTH CARE EDUCATION/TRAINING PROGRAM

## 2022-02-01 PROCEDURE — 258N000003 HC RX IP 258 OP 636: Performed by: SURGERY

## 2022-02-01 PROCEDURE — 58180 PARTIAL HYSTERECTOMY: CPT | Mod: GC | Performed by: UROLOGY

## 2022-02-01 PROCEDURE — 84132 ASSAY OF SERUM POTASSIUM: CPT

## 2022-02-01 PROCEDURE — 84295 ASSAY OF SERUM SODIUM: CPT

## 2022-02-01 PROCEDURE — 250N000013 HC RX MED GY IP 250 OP 250 PS 637: Performed by: COLON & RECTAL SURGERY

## 2022-02-01 PROCEDURE — 258N000003 HC RX IP 258 OP 636: Performed by: STUDENT IN AN ORGANIZED HEALTH CARE EDUCATION/TRAINING PROGRAM

## 2022-02-01 PROCEDURE — 250N000009 HC RX 250: Performed by: ANESTHESIOLOGY

## 2022-02-01 PROCEDURE — P9041 ALBUMIN (HUMAN),5%, 50ML: HCPCS | Performed by: STUDENT IN AN ORGANIZED HEALTH CARE EDUCATION/TRAINING PROGRAM

## 2022-02-01 PROCEDURE — 36415 COLL VENOUS BLD VENIPUNCTURE: CPT | Performed by: COLON & RECTAL SURGERY

## 2022-02-01 PROCEDURE — 250N000011 HC RX IP 250 OP 636: Performed by: ANESTHESIOLOGY

## 2022-02-01 PROCEDURE — 84132 ASSAY OF SERUM POTASSIUM: CPT | Performed by: COLON & RECTAL SURGERY

## 2022-02-01 PROCEDURE — 0UT20ZZ RESECTION OF BILATERAL OVARIES, OPEN APPROACH: ICD-10-PCS | Performed by: UROLOGY

## 2022-02-01 PROCEDURE — 0DNW0ZZ RELEASE PERITONEUM, OPEN APPROACH: ICD-10-PCS | Performed by: COLON & RECTAL SURGERY

## 2022-02-01 PROCEDURE — C1765 ADHESION BARRIER: HCPCS | Performed by: COLON & RECTAL SURGERY

## 2022-02-01 PROCEDURE — 80048 BASIC METABOLIC PNL TOTAL CA: CPT | Performed by: COLON & RECTAL SURGERY

## 2022-02-01 PROCEDURE — 0UT70ZZ RESECTION OF BILATERAL FALLOPIAN TUBES, OPEN APPROACH: ICD-10-PCS | Performed by: UROLOGY

## 2022-02-01 PROCEDURE — 84100 ASSAY OF PHOSPHORUS: CPT | Performed by: COLON & RECTAL SURGERY

## 2022-02-01 PROCEDURE — 0UT90ZL RESECTION OF UTERUS, SUPRACERVICAL, OPEN APPROACH: ICD-10-PCS | Performed by: UROLOGY

## 2022-02-01 PROCEDURE — 0DJD8ZZ INSPECTION OF LOWER INTESTINAL TRACT, VIA NATURAL OR ARTIFICIAL OPENING ENDOSCOPIC: ICD-10-PCS | Performed by: COLON & RECTAL SURGERY

## 2022-02-01 PROCEDURE — 120N000002 HC R&B MED SURG/OB UMMC

## 2022-02-01 PROCEDURE — 272N000001 HC OR GENERAL SUPPLY STERILE: Performed by: COLON & RECTAL SURGERY

## 2022-02-01 PROCEDURE — 250N000011 HC RX IP 250 OP 636: Performed by: COLON & RECTAL SURGERY

## 2022-02-01 PROCEDURE — 51840 ATTACH BLADDER/URETHRA: CPT | Mod: GC | Performed by: UROLOGY

## 2022-02-01 PROCEDURE — 88305 TISSUE EXAM BY PATHOLOGIST: CPT | Mod: TC | Performed by: UROLOGY

## 2022-02-01 PROCEDURE — 82330 ASSAY OF CALCIUM: CPT

## 2022-02-01 PROCEDURE — 250N000009 HC RX 250: Performed by: STUDENT IN AN ORGANIZED HEALTH CARE EDUCATION/TRAINING PROGRAM

## 2022-02-01 PROCEDURE — 250N000013 HC RX MED GY IP 250 OP 250 PS 637: Performed by: STUDENT IN AN ORGANIZED HEALTH CARE EDUCATION/TRAINING PROGRAM

## 2022-02-01 PROCEDURE — 83735 ASSAY OF MAGNESIUM: CPT | Performed by: COLON & RECTAL SURGERY

## 2022-02-01 PROCEDURE — 85018 HEMOGLOBIN: CPT

## 2022-02-01 PROCEDURE — 57280 SUSPENSION OF VAGINA: CPT | Mod: GC | Performed by: UROLOGY

## 2022-02-01 PROCEDURE — 0TJB8ZZ INSPECTION OF BLADDER, VIA NATURAL OR ARTIFICIAL OPENING ENDOSCOPIC: ICD-10-PCS | Performed by: UROLOGY

## 2022-02-01 PROCEDURE — 45540 CORRECT RECTAL PROLAPSE: CPT | Mod: GC | Performed by: COLON & RECTAL SURGERY

## 2022-02-01 PROCEDURE — 250N000012 HC RX MED GY IP 250 OP 636 PS 637: Performed by: COLON & RECTAL SURGERY

## 2022-02-01 PROCEDURE — 83605 ASSAY OF LACTIC ACID: CPT

## 2022-02-01 PROCEDURE — 360N000076 HC SURGERY LEVEL 3, PER MIN: Performed by: COLON & RECTAL SURGERY

## 2022-02-01 PROCEDURE — C9290 INJ, BUPIVACAINE LIPOSOME: HCPCS | Performed by: STUDENT IN AN ORGANIZED HEALTH CARE EDUCATION/TRAINING PROGRAM

## 2022-02-01 PROCEDURE — 258N000003 HC RX IP 258 OP 636: Performed by: COLON & RECTAL SURGERY

## 2022-02-01 RX ORDER — FAMOTIDINE 20 MG/1
40 TABLET, FILM COATED ORAL 2 TIMES DAILY PRN
Status: DISCONTINUED | OUTPATIENT
Start: 2022-02-02 | End: 2022-02-05 | Stop reason: HOSPADM

## 2022-02-01 RX ORDER — NALOXONE HYDROCHLORIDE 0.4 MG/ML
0.4 INJECTION, SOLUTION INTRAMUSCULAR; INTRAVENOUS; SUBCUTANEOUS
Status: DISCONTINUED | OUTPATIENT
Start: 2022-02-01 | End: 2022-02-05 | Stop reason: HOSPADM

## 2022-02-01 RX ORDER — NALOXONE HYDROCHLORIDE 0.4 MG/ML
0.2 INJECTION, SOLUTION INTRAMUSCULAR; INTRAVENOUS; SUBCUTANEOUS
Status: DISCONTINUED | OUTPATIENT
Start: 2022-02-01 | End: 2022-02-01 | Stop reason: HOSPADM

## 2022-02-01 RX ORDER — NALOXONE HYDROCHLORIDE 0.4 MG/ML
0.4 INJECTION, SOLUTION INTRAMUSCULAR; INTRAVENOUS; SUBCUTANEOUS
Status: DISCONTINUED | OUTPATIENT
Start: 2022-02-01 | End: 2022-02-01 | Stop reason: HOSPADM

## 2022-02-01 RX ORDER — ONDANSETRON 2 MG/ML
INJECTION INTRAMUSCULAR; INTRAVENOUS PRN
Status: DISCONTINUED | OUTPATIENT
Start: 2022-02-01 | End: 2022-02-01

## 2022-02-01 RX ORDER — LABETALOL HYDROCHLORIDE 5 MG/ML
10 INJECTION, SOLUTION INTRAVENOUS
Status: DISCONTINUED | OUTPATIENT
Start: 2022-02-01 | End: 2022-02-01 | Stop reason: HOSPADM

## 2022-02-01 RX ORDER — OXYCODONE HYDROCHLORIDE 5 MG/1
5 TABLET ORAL EVERY 4 HOURS PRN
Status: DISCONTINUED | OUTPATIENT
Start: 2022-02-01 | End: 2022-02-01 | Stop reason: HOSPADM

## 2022-02-01 RX ORDER — ONDANSETRON 4 MG/1
4 TABLET, ORALLY DISINTEGRATING ORAL EVERY 30 MIN PRN
Status: DISCONTINUED | OUTPATIENT
Start: 2022-02-01 | End: 2022-02-01 | Stop reason: HOSPADM

## 2022-02-01 RX ORDER — EPHEDRINE SULFATE 50 MG/ML
INJECTION, SOLUTION INTRAMUSCULAR; INTRAVENOUS; SUBCUTANEOUS PRN
Status: DISCONTINUED | OUTPATIENT
Start: 2022-02-01 | End: 2022-02-01

## 2022-02-01 RX ORDER — GABAPENTIN 300 MG/1
300 CAPSULE ORAL
Status: DISCONTINUED | OUTPATIENT
Start: 2022-02-01 | End: 2022-02-05 | Stop reason: HOSPADM

## 2022-02-01 RX ORDER — POTASSIUM CHLORIDE 7.45 MG/ML
INJECTION INTRAVENOUS PRN
Status: DISCONTINUED | OUTPATIENT
Start: 2022-02-01 | End: 2022-02-01

## 2022-02-01 RX ORDER — NALOXONE HYDROCHLORIDE 0.4 MG/ML
0.2 INJECTION, SOLUTION INTRAMUSCULAR; INTRAVENOUS; SUBCUTANEOUS
Status: DISCONTINUED | OUTPATIENT
Start: 2022-02-01 | End: 2022-02-05 | Stop reason: HOSPADM

## 2022-02-01 RX ORDER — GRANISETRON HYDROCHLORIDE 1 MG/ML
1 INJECTION INTRAVENOUS ONCE
Status: COMPLETED | OUTPATIENT
Start: 2022-02-01 | End: 2022-02-01

## 2022-02-01 RX ORDER — LEVOTHYROXINE SODIUM 100 UG/1
100 TABLET ORAL DAILY
Status: DISCONTINUED | OUTPATIENT
Start: 2022-02-02 | End: 2022-02-05 | Stop reason: HOSPADM

## 2022-02-01 RX ORDER — SODIUM CHLORIDE, SODIUM LACTATE, POTASSIUM CHLORIDE, CALCIUM CHLORIDE 600; 310; 30; 20 MG/100ML; MG/100ML; MG/100ML; MG/100ML
INJECTION, SOLUTION INTRAVENOUS CONTINUOUS
Status: DISCONTINUED | OUTPATIENT
Start: 2022-02-01 | End: 2022-02-01 | Stop reason: HOSPADM

## 2022-02-01 RX ORDER — FENTANYL CITRATE-0.9 % NACL/PF 10 MCG/ML
PLASTIC BAG, INJECTION (ML) INTRAVENOUS PRN
Status: DISCONTINUED | OUTPATIENT
Start: 2022-02-01 | End: 2022-02-01

## 2022-02-01 RX ORDER — ERTAPENEM 1 G/1
1 INJECTION, POWDER, LYOPHILIZED, FOR SOLUTION INTRAMUSCULAR; INTRAVENOUS ONCE
Status: COMPLETED | OUTPATIENT
Start: 2022-02-02 | End: 2022-02-02

## 2022-02-01 RX ORDER — FLUMAZENIL 0.1 MG/ML
0.2 INJECTION, SOLUTION INTRAVENOUS
Status: DISCONTINUED | OUTPATIENT
Start: 2022-02-01 | End: 2022-02-01 | Stop reason: HOSPADM

## 2022-02-01 RX ORDER — LORATADINE 10 MG/1
10 TABLET ORAL DAILY PRN
Status: DISCONTINUED | OUTPATIENT
Start: 2022-02-01 | End: 2022-02-05 | Stop reason: HOSPADM

## 2022-02-01 RX ORDER — ESCITALOPRAM OXALATE 20 MG/1
20 TABLET ORAL DAILY
Status: DISCONTINUED | OUTPATIENT
Start: 2022-02-02 | End: 2022-02-05 | Stop reason: HOSPADM

## 2022-02-01 RX ORDER — ACETAMINOPHEN 325 MG/1
975 TABLET ORAL ONCE
Status: COMPLETED | OUTPATIENT
Start: 2022-02-01 | End: 2022-02-01

## 2022-02-01 RX ORDER — SODIUM CHLORIDE, SODIUM LACTATE, POTASSIUM CHLORIDE, CALCIUM CHLORIDE 600; 310; 30; 20 MG/100ML; MG/100ML; MG/100ML; MG/100ML
INJECTION, SOLUTION INTRAVENOUS CONTINUOUS PRN
Status: DISCONTINUED | OUTPATIENT
Start: 2022-02-01 | End: 2022-02-01

## 2022-02-01 RX ORDER — ERTAPENEM 1 G/1
1 INJECTION, POWDER, LYOPHILIZED, FOR SOLUTION INTRAMUSCULAR; INTRAVENOUS
Status: COMPLETED | OUTPATIENT
Start: 2022-02-01 | End: 2022-02-01

## 2022-02-01 RX ORDER — LIDOCAINE 40 MG/G
CREAM TOPICAL
Status: DISCONTINUED | OUTPATIENT
Start: 2022-02-01 | End: 2022-02-05 | Stop reason: HOSPADM

## 2022-02-01 RX ORDER — ONDANSETRON 2 MG/ML
4 INJECTION INTRAMUSCULAR; INTRAVENOUS EVERY 30 MIN PRN
Status: DISCONTINUED | OUTPATIENT
Start: 2022-02-01 | End: 2022-02-01 | Stop reason: HOSPADM

## 2022-02-01 RX ORDER — FENTANYL CITRATE 50 UG/ML
25-50 INJECTION, SOLUTION INTRAMUSCULAR; INTRAVENOUS
Status: DISCONTINUED | OUTPATIENT
Start: 2022-02-01 | End: 2022-02-01 | Stop reason: HOSPADM

## 2022-02-01 RX ORDER — FENTANYL CITRATE 50 UG/ML
25 INJECTION, SOLUTION INTRAMUSCULAR; INTRAVENOUS EVERY 5 MIN PRN
Status: DISCONTINUED | OUTPATIENT
Start: 2022-02-01 | End: 2022-02-01 | Stop reason: HOSPADM

## 2022-02-01 RX ORDER — PANTOPRAZOLE SODIUM 40 MG/1
40 TABLET, DELAYED RELEASE ORAL 2 TIMES DAILY
Status: DISCONTINUED | OUTPATIENT
Start: 2022-02-02 | End: 2022-02-05 | Stop reason: HOSPADM

## 2022-02-01 RX ORDER — MODAFINIL 100 MG/1
400 TABLET ORAL DAILY
Status: DISCONTINUED | OUTPATIENT
Start: 2022-02-02 | End: 2022-02-05 | Stop reason: HOSPADM

## 2022-02-01 RX ORDER — DULOXETIN HYDROCHLORIDE 20 MG/1
20 CAPSULE, DELAYED RELEASE ORAL DAILY
Status: DISCONTINUED | OUTPATIENT
Start: 2022-02-02 | End: 2022-02-05 | Stop reason: HOSPADM

## 2022-02-01 RX ORDER — ALBUMIN, HUMAN INJ 5% 5 %
SOLUTION INTRAVENOUS CONTINUOUS PRN
Status: DISCONTINUED | OUTPATIENT
Start: 2022-02-01 | End: 2022-02-01

## 2022-02-01 RX ORDER — LORAZEPAM 2 MG/ML
0.5 INJECTION INTRAMUSCULAR EVERY 6 HOURS PRN
Status: DISCONTINUED | OUTPATIENT
Start: 2022-02-01 | End: 2022-02-05 | Stop reason: HOSPADM

## 2022-02-01 RX ORDER — DEXTROSE MONOHYDRATE 25 G/50ML
25-50 INJECTION, SOLUTION INTRAVENOUS
Status: DISCONTINUED | OUTPATIENT
Start: 2022-02-01 | End: 2022-02-05 | Stop reason: HOSPADM

## 2022-02-01 RX ORDER — SODIUM CHLORIDE, SODIUM LACTATE, POTASSIUM CHLORIDE, CALCIUM CHLORIDE 600; 310; 30; 20 MG/100ML; MG/100ML; MG/100ML; MG/100ML
INJECTION, SOLUTION INTRAVENOUS CONTINUOUS
Status: DISCONTINUED | OUTPATIENT
Start: 2022-02-01 | End: 2022-02-04

## 2022-02-01 RX ORDER — GLYCOPYRROLATE 0.2 MG/ML
INJECTION, SOLUTION INTRAMUSCULAR; INTRAVENOUS PRN
Status: DISCONTINUED | OUTPATIENT
Start: 2022-02-01 | End: 2022-02-01

## 2022-02-01 RX ORDER — NICOTINE POLACRILEX 4 MG
15-30 LOZENGE BUCCAL
Status: DISCONTINUED | OUTPATIENT
Start: 2022-02-01 | End: 2022-02-05 | Stop reason: HOSPADM

## 2022-02-01 RX ORDER — HYDROXYZINE HYDROCHLORIDE 25 MG/1
25-50 TABLET, FILM COATED ORAL 3 TIMES DAILY PRN
Status: DISCONTINUED | OUTPATIENT
Start: 2022-02-01 | End: 2022-02-05 | Stop reason: HOSPADM

## 2022-02-01 RX ORDER — TRAZODONE HYDROCHLORIDE 50 MG/1
50 TABLET, FILM COATED ORAL
Status: DISCONTINUED | OUTPATIENT
Start: 2022-02-01 | End: 2022-02-05 | Stop reason: HOSPADM

## 2022-02-01 RX ORDER — METHOCARBAMOL 500 MG/1
500 TABLET, FILM COATED ORAL 3 TIMES DAILY PRN
Status: DISCONTINUED | OUTPATIENT
Start: 2022-02-01 | End: 2022-02-05 | Stop reason: HOSPADM

## 2022-02-01 RX ORDER — HYDROMORPHONE HCL IN WATER/PF 6 MG/30 ML
0.2 PATIENT CONTROLLED ANALGESIA SYRINGE INTRAVENOUS EVERY 5 MIN PRN
Status: DISCONTINUED | OUTPATIENT
Start: 2022-02-01 | End: 2022-02-01 | Stop reason: HOSPADM

## 2022-02-01 RX ORDER — DEXTROSE MONOHYDRATE, SODIUM CHLORIDE, AND POTASSIUM CHLORIDE 50; 1.49; 4.5 G/1000ML; G/1000ML; G/1000ML
INJECTION, SOLUTION INTRAVENOUS CONTINUOUS
Status: DISCONTINUED | OUTPATIENT
Start: 2022-02-01 | End: 2022-02-02

## 2022-02-01 RX ORDER — ACETAMINOPHEN 500 MG
1000 TABLET ORAL 4 TIMES DAILY
Status: DISCONTINUED | OUTPATIENT
Start: 2022-02-01 | End: 2022-02-05 | Stop reason: HOSPADM

## 2022-02-01 RX ORDER — PROPOFOL 10 MG/ML
INJECTION, EMULSION INTRAVENOUS PRN
Status: DISCONTINUED | OUTPATIENT
Start: 2022-02-01 | End: 2022-02-01

## 2022-02-01 RX ORDER — FENTANYL CITRATE 50 UG/ML
INJECTION, SOLUTION INTRAMUSCULAR; INTRAVENOUS PRN
Status: DISCONTINUED | OUTPATIENT
Start: 2022-02-01 | End: 2022-02-01

## 2022-02-01 RX ORDER — BUPIVACAINE HYDROCHLORIDE 2.5 MG/ML
INJECTION, SOLUTION EPIDURAL; INFILTRATION; INTRACAUDAL
Status: COMPLETED | OUTPATIENT
Start: 2022-02-01 | End: 2022-02-01

## 2022-02-01 RX ORDER — LIDOCAINE HYDROCHLORIDE 20 MG/ML
INJECTION, SOLUTION INFILTRATION; PERINEURAL PRN
Status: DISCONTINUED | OUTPATIENT
Start: 2022-02-01 | End: 2022-02-01

## 2022-02-01 RX ORDER — PHENAZOPYRIDINE HYDROCHLORIDE 200 MG/1
200 TABLET, FILM COATED ORAL ONCE
Status: COMPLETED | OUTPATIENT
Start: 2022-02-01 | End: 2022-02-01

## 2022-02-01 RX ADMIN — ACETAMINOPHEN 975 MG: 325 TABLET, FILM COATED ORAL at 08:50

## 2022-02-01 RX ADMIN — ROCURONIUM BROMIDE 20 MG: 50 INJECTION, SOLUTION INTRAVENOUS at 11:53

## 2022-02-01 RX ADMIN — SUGAMMADEX 100 MG: 100 INJECTION, SOLUTION INTRAVENOUS at 13:22

## 2022-02-01 RX ADMIN — HYDROMORPHONE HYDROCHLORIDE 0.5 MG: 1 INJECTION, SOLUTION INTRAMUSCULAR; INTRAVENOUS; SUBCUTANEOUS at 10:50

## 2022-02-01 RX ADMIN — FENTANYL CITRATE 50 MCG: 50 INJECTION, SOLUTION INTRAMUSCULAR; INTRAVENOUS at 09:07

## 2022-02-01 RX ADMIN — Medication 10 MG: at 09:48

## 2022-02-01 RX ADMIN — SODIUM CHLORIDE, POTASSIUM CHLORIDE, SODIUM LACTATE AND CALCIUM CHLORIDE: 600; 310; 30; 20 INJECTION, SOLUTION INTRAVENOUS at 20:42

## 2022-02-01 RX ADMIN — SODIUM CHLORIDE, POTASSIUM CHLORIDE, SODIUM LACTATE AND CALCIUM CHLORIDE: 600; 310; 30; 20 INJECTION, SOLUTION INTRAVENOUS at 08:58

## 2022-02-01 RX ADMIN — ROCURONIUM BROMIDE 20 MG: 50 INJECTION, SOLUTION INTRAVENOUS at 10:43

## 2022-02-01 RX ADMIN — FENTANYL CITRATE 50 MCG: 50 INJECTION, SOLUTION INTRAMUSCULAR; INTRAVENOUS at 08:19

## 2022-02-01 RX ADMIN — BUPIVACAINE 20 ML: 13.3 INJECTION, SUSPENSION, LIPOSOMAL INFILTRATION at 08:27

## 2022-02-01 RX ADMIN — PROPOFOL 120 MG: 10 INJECTION, EMULSION INTRAVENOUS at 09:08

## 2022-02-01 RX ADMIN — Medication 100 MCG: at 12:33

## 2022-02-01 RX ADMIN — FENTANYL CITRATE 50 MCG: 50 INJECTION, SOLUTION INTRAMUSCULAR; INTRAVENOUS at 10:50

## 2022-02-01 RX ADMIN — FENTANYL CITRATE 50 MCG: 50 INJECTION, SOLUTION INTRAMUSCULAR; INTRAVENOUS at 10:16

## 2022-02-01 RX ADMIN — ONDANSETRON 4 MG: 2 INJECTION INTRAMUSCULAR; INTRAVENOUS at 13:09

## 2022-02-01 RX ADMIN — SODIUM CHLORIDE, SODIUM LACTATE, POTASSIUM CHLORIDE, CALCIUM CHLORIDE: 600; 310; 30; 20 INJECTION, SOLUTION INTRAVENOUS at 09:18

## 2022-02-01 RX ADMIN — LIDOCAINE HYDROCHLORIDE 40 MG: 20 INJECTION, SOLUTION INFILTRATION; PERINEURAL at 09:08

## 2022-02-01 RX ADMIN — ENOXAPARIN SODIUM 40 MG: 40 INJECTION SUBCUTANEOUS at 07:32

## 2022-02-01 RX ADMIN — INSULIN GLARGINE 5 UNITS: 100 INJECTION, SOLUTION SUBCUTANEOUS at 22:54

## 2022-02-01 RX ADMIN — Medication 2.5 MG: at 13:01

## 2022-02-01 RX ADMIN — HYDROMORPHONE HYDROCHLORIDE 0.5 MG: 1 INJECTION, SOLUTION INTRAMUSCULAR; INTRAVENOUS; SUBCUTANEOUS at 12:49

## 2022-02-01 RX ADMIN — Medication 5 MG: at 11:27

## 2022-02-01 RX ADMIN — Medication 25 MCG: at 12:58

## 2022-02-01 RX ADMIN — ROCURONIUM BROMIDE 20 MG: 50 INJECTION, SOLUTION INTRAVENOUS at 11:23

## 2022-02-01 RX ADMIN — ROCURONIUM BROMIDE 20 MG: 50 INJECTION, SOLUTION INTRAVENOUS at 12:45

## 2022-02-01 RX ADMIN — PROPOFOL 30 MG: 10 INJECTION, EMULSION INTRAVENOUS at 13:15

## 2022-02-01 RX ADMIN — ROCURONIUM BROMIDE 30 MG: 50 INJECTION, SOLUTION INTRAVENOUS at 09:45

## 2022-02-01 RX ADMIN — POTASSIUM CHLORIDE, DEXTROSE MONOHYDRATE AND SODIUM CHLORIDE: 150; 5; 450 INJECTION, SOLUTION INTRAVENOUS at 14:48

## 2022-02-01 RX ADMIN — MIDAZOLAM 1 MG: 1 INJECTION INTRAMUSCULAR; INTRAVENOUS at 08:17

## 2022-02-01 RX ADMIN — ROCURONIUM BROMIDE 50 MG: 50 INJECTION, SOLUTION INTRAVENOUS at 09:09

## 2022-02-01 RX ADMIN — ROCURONIUM BROMIDE 20 MG: 50 INJECTION, SOLUTION INTRAVENOUS at 10:04

## 2022-02-01 RX ADMIN — ERTAPENEM SODIUM 1 G: 1 INJECTION, POWDER, LYOPHILIZED, FOR SOLUTION INTRAMUSCULAR; INTRAVENOUS at 09:07

## 2022-02-01 RX ADMIN — FENTANYL CITRATE 50 MCG: 50 INJECTION, SOLUTION INTRAMUSCULAR; INTRAVENOUS at 10:04

## 2022-02-01 RX ADMIN — ALBUMIN (HUMAN): 12.5 SOLUTION INTRAVENOUS at 11:30

## 2022-02-01 RX ADMIN — GLYCOPYRROLATE 0.2 MG: 0.2 INJECTION, SOLUTION INTRAMUSCULAR; INTRAVENOUS at 11:27

## 2022-02-01 RX ADMIN — PROCHLORPERAZINE EDISYLATE 5 MG: 5 INJECTION INTRAMUSCULAR; INTRAVENOUS at 14:17

## 2022-02-01 RX ADMIN — Medication: at 18:52

## 2022-02-01 RX ADMIN — GRANISETRON HYDROCHLORIDE 1 MG: 1 INJECTION, SOLUTION INTRAVENOUS at 08:06

## 2022-02-01 RX ADMIN — POTASSIUM CHLORIDE 10 MEQ: 7.46 INJECTION, SOLUTION INTRAVENOUS at 12:10

## 2022-02-01 RX ADMIN — BUPIVACAINE HYDROCHLORIDE 20 ML: 2.5 INJECTION, SOLUTION EPIDURAL; INFILTRATION; INTRACAUDAL; PERINEURAL at 08:27

## 2022-02-01 RX ADMIN — ROCURONIUM BROMIDE 30 MG: 50 INJECTION, SOLUTION INTRAVENOUS at 10:16

## 2022-02-01 RX ADMIN — FENTANYL CITRATE 50 MCG: 50 INJECTION, SOLUTION INTRAMUSCULAR; INTRAVENOUS at 09:33

## 2022-02-01 RX ADMIN — ROCURONIUM BROMIDE 20 MG: 50 INJECTION, SOLUTION INTRAVENOUS at 12:18

## 2022-02-01 RX ADMIN — PHENAZOPYRIDINE HYDROCHLORIDE 200 MG: 200 TABLET, FILM COATED ORAL at 08:51

## 2022-02-01 ASSESSMENT — ACTIVITIES OF DAILY LIVING (ADL)
ADLS_ACUITY_SCORE: 6
FALL_HISTORY_WITHIN_LAST_SIX_MONTHS: NO
DOING_ERRANDS_INDEPENDENTLY_DIFFICULTY: NO
ADLS_ACUITY_SCORE: 8
ADLS_ACUITY_SCORE: 6
ADLS_ACUITY_SCORE: 8
WALKING_OR_CLIMBING_STAIRS_DIFFICULTY: NO
DIFFICULTY_EATING/SWALLOWING: NO
ADLS_ACUITY_SCORE: 6
ADLS_ACUITY_SCORE: 8
ADLS_ACUITY_SCORE: 6
ADLS_ACUITY_SCORE: 6
ADLS_ACUITY_SCORE: 8
TOILETING_ISSUES: NO
DRESSING/BATHING_DIFFICULTY: NO
ADLS_ACUITY_SCORE: 6

## 2022-02-01 ASSESSMENT — ENCOUNTER SYMPTOMS: SEIZURES: 0

## 2022-02-01 ASSESSMENT — LIFESTYLE VARIABLES: TOBACCO_USE: 0

## 2022-02-01 ASSESSMENT — MIFFLIN-ST. JEOR: SCORE: 1070

## 2022-02-01 NOTE — PROGRESS NOTES
February 1, 2022    Patient seen in the preoperative area.  She denies any changes in her health since last visit.  Allergies confirmed.  Procedure and its risks again discussed.      We discussed much of what we will do will again depend on what we see in the OR at the time of the surgery and ultimately what Dr Sheridan will do.  We again discussed that we would primarily work on the prolapse and consider the JESSICA portions depending on how the rest of the surgery goes.  At this time questions answered and consents signed.  Plan to proceed as scheduled.    Nicole Rivera MD MPH  (she/her/hers)   of Urology  [facility]

## 2022-02-01 NOTE — ANESTHESIA PROCEDURE NOTES
Other Procedure Note    Pre-Procedure   Staff -        Anesthesiologist:  Edwin Craven MD       Resident/Fellow: Cricket Badillo MD       Performed By: anesthesiologist and resident       Location: pre-op       Pre-Anesthestic Checklist: patient identified, IV checked, site marked, risks and benefits discussed, informed consent, monitors and equipment checked, pre-op evaluation, at physician/surgeon's request and post-op pain management  Timeout:       Correct Patient: Yes        Correct Procedure: Yes        Correct Site: Yes        Correct Position: Yes        Correct Laterality: Yes        Site Marked: Yes  Procedure Documentation  Procedure: Other       Laterality: bilateral       Patient Position: lateral       Patient Prep/Sterile Barriers: sterile gloves, patient draped       Needle Type: short bevel       Needle Gauge: 21.        Needle Length (millimeters): 110        Ultrasound guided       1. Ultrasound was used to identify targeted nerve, plexus, vascular marker, or fascial plane and place a needle adjacent to it in real-time.       2. Ultrasound was used to visualize the spread of anesthetic in close proximity to the above referenced structure.       3. A permanent image is entered into the patient's record.    Assessment/Narrative         The placement was negative for: painful injection       Paresthesias: No.     Bolus given via needle..        Secured via.        Insertion/Infusion Method: Single Shot       Complications: none    Medication(s) Administered   Bupivacaine 0.25% PF (Infiltration), 20 mL  Bupivacaine liposome (Exparel) 1.3% LA inj susp (Infiltration), 20 mL  Medication Administration Time: 2/1/2022 8:27 AM     Comments:  Bilateral quadratus lumborum block

## 2022-02-01 NOTE — BRIEF OP NOTE
[facility]    Brief Operative Note    Pre-operative diagnosis: Rectal prolapse [K62.3]  Post-operative diagnosis Same as pre-operative diagnosis    Procedure: Procedure(s):  RECTOPEXY, ABDOMINAL  SIGMOIDOSCOPY, FLEXIBLE  supracervical hysterectomy, bilateral salpingooophrectomy  Sacrocolpopexy versus uterosacral ligament suspension, pina colposuspension with Cystoscopy  Surgeon: Surgeon(s) and Role:  Panel 1:     * Uriah Sheridan MD - Primary     * Karin Rosas MD - Fellow - Assisting  Panel 2:     * Nicole Rivera MD - Primary     * Farhad Bowers MD - Resident - Assisting  Anesthesia: Combined General with Block   Estimated Blood Loss: Less than 50 ml    Drains: None  Specimens:   ID Type Source Tests Collected by Time Destination   1 : Uterus, bilateral fallopian tubes, and ovaries. Tissue Uterus, Bilateral Fallopian Tubes & Ovaries SURGICAL PATHOLOGY EXAM Nicole Rivera MD 2/1/2022 10:36 AM      Findings:   Dense intra-abdominal adhesions to the anterior midline. Redundant sigmoid colon. Negative leak test post rectopexy  Complications: None.  Implants: * No implants in log *

## 2022-02-01 NOTE — ANESTHESIA PREPROCEDURE EVALUATION
Anesthesia Pre-Procedure Evaluation    Patient: Lynn Thompson   MRN: [id number] : 1965        Preoperative Diagnosis: Rectal prolapse [K62.3]    Procedure : Procedure(s):  RECTOPEXY, ABDOMINAL  SIGMOIDOSCOPY, FLEXIBLE  supracervical hysterectomy, bilateral salpingooophrectomy  Sacrocolpopexy versus uterosacral ligament suspension, pina colposuspension with Cystoscopy          Past Medical History:   Diagnosis Date     Benign paroxysmal positional vertigo     occ.      Calculus of kidney 05/2005    x1 on L side passed, several stones.  Has been tested for oxalate.     Chronic abdominal pain 2013     Chronic pain      Chronic pancreatitis 2013     Depression     also occ panic spells     Depressive disorder      Diabetes (H) 5/10/2013    Total Pancreatomy with Auto Islets Transplant     Dyspepsia 1999    H. pylori   treated     Headaches     still periodic HA's ;  often 5X/week     Hypertension 2016    Stress related     Iron deficiency anemia secondary to inadequate dietary iron intake 2003    relates to gastric bypass     Post-pancreatectomy diabetes melltius 2013     Sleep apnea     uses splint     Spasm of sphincter of Oddi     surgical [place] [facility] [place]     Thyroid nodule 2016     Vaccination not carried out       Past Surgical History:   Procedure Laterality Date     ABDOMINOPLASTY  2002    Tummy tuck     APPENDECTOMY  1990     BUNIONECTOMY Right 1998     CBD Stent placement  2002    CBD stent; Dr. Presley      SECTION       CHOLECYSTECTOMY       COLONOSCOPY   &     colonoscopy     COLONOSCOPY       COLONOSCOPY N/A 3/31/2021    Procedure: COLONOSCOPY INCOMPLETE Aborted due to incomplete prep  will need to take additional prep and return tomorrow 21;  Surgeon: Ihsan Saenz MD;  Location:  GI     COMBINED CYSTOSCOPY, RETROGRADES, URETEROSCOPY, LASER HOLMIUM LITHOTRIPSY URETER(S), INSERT  STENT Right 03/23/2015    Procedure: COMBINED CYSTOSCOPY, RETROGRADES, URETEROSCOPY, LASER HOLMIUM LITHOTRIPSY URETER(S), INSERT STENT;  Surgeon: Kennedi Aldana MD;  Location: [facility] OR     COMBINED CYSTOSCOPY, RETROGRADES, URETEROSCOPY, LASER HOLMIUM LITHOTRIPSY URETER(S), INSERT STENT Right 04/20/2015    Procedure: COMBINED CYSTOSCOPY, RETROGRADES, URETEROSCOPY, LASER HOLMIUM LITHOTRIPSY URETER(S), INSERT STENT;  Surgeon: Kennedi Aldana MD;  Location: [facility] OR     COSMETIC SURGERY  6/24/2002    Tummy tuck     CYSTECTOMY OVARIAN BENIGN Right      CYSTOSCOPY, RETROGRADES, INSERT STENT URETER(S), COMBINED  10/02/2012    Procedure: COMBINED CYSTOSCOPY, RETROGRADES, INSERT STENT URETER(S);  COMBINED CYSTOSCOPY,  , INSERT LEFT STENT URETER;  Surgeon: Johny Baez MD;  Location: [facility] OR     ESOPHAGOSCOPY, GASTROSCOPY, DUODENOSCOPY (EGD), COMBINED N/A 01/24/2018    Procedure: COMBINED ESOPHAGOSCOPY, GASTROSCOPY, DUODENOSCOPY (EGD);  ESOPHAGOSCOPY, GASTROSCOPY, DUODENOSCOPY (EGD)    ;  Surgeon: Tamir Rodgers MD;  Location: [facility] GI     EXTRACORPOREAL SHOCK WAVE LITHOTRIPSY (ESWL)  10/16/2012    Procedure: EXTRACORPOREAL SHOCK WAVE LITHOTRIPSY (ESWL);  left EXTRACORPOREAL SHOCK WAVE LITHOTRIPSY (ESWL) ;  Surgeon: Johny Baez MD;  Location: [facility] OR     Gastric bypass NOS       HERNIA REPAIR  02/2015     IRRIGATION AND DEBRIDEMENT HAND, COMBINED Left 10/30/2020    Procedure: Left hand sharp excisional debridement of skin, subcutaneous tissue and fat with a scalpel, 2 x 1 x 1 cm.;  Surgeon: Demian Renteria MD;  Location: [facility] OR     LAP, LYSIS OF ADHESIONS       LAPAROSCOPIC LYSIS ADHESIONS N/A 02/20/2015    Procedure: LAPAROSCOPIC LYSIS ADHESIONS;  Surgeon: Aaron Early MD;  [facility]     LAPAROSCOPIC LYSIS ADHESIONS N/A 12/29/2015    Procedure: LAPAROSCOPIC LYSIS ADHESIONS;  Surgeon: Araon Early MD;  [facility]     PANCREATECTOMY, TRANSPLANT AUTO ISLET CELL, COMBINED  05/10/2013     Procedure: COMBINED PANCREATECTOMY, TRANSPLANT AUTO ISLET CELL;  Pancreatectomy, Auto Islet Cell Transplant   hernia repair, jejunostomy tube and liver biopsies with Anesthesia General with block;  Surgeon: Aaron Early MD;  [facility]     Partial ileum resection  1992     REPAIR PTOSIS BROW BILATERAL Bilateral 06/09/2020    Procedure: BILATERAL BROW PTOSIS REPAIR;  Surgeon: Denise Alberts MD;  Location:  OR     Surgery for SBO  2015     TONSILLECTOMY, ADENOIDECTOMY, COMBINED  1997     TRANSPLANT  5/10/13    Pancreatic Auto-Islet Transplant      Allergies   Allergen Reactions     Corticosteroids Other (See Comments)     All oral, IV and injectable steroids are contraindicated (unless in life threatening situations) in Islet Auto transplant recipients. They can cause irreversible loss of islet cell function. Please contact patient's transplant care coordinator, Erlinda Multani RN BSN at [phone]/pager: [phone] and endocrinologist prior to administration.       Povidone Iodine Hives     Causes skin to blister     Naproxen      Other reaction(s): Abdominal Pain  Pt allergic to Naprosyn     Nsaids      naprosyn = GI upset     Povidone Iodine      blisters     Sulfasalazine Nausea and Nausea and Vomiting      Social History     Tobacco Use     Smoking status: Never Smoker     Smokeless tobacco: Never Used   Substance Use Topics     Alcohol use: Not Currently     Alcohol/week: 0.0 standard drinks      Wt Readings from Last 1 Encounters:   02/01/22 51 kg (112 lb 7 oz)        Anesthesia Evaluation   Pt has had prior anesthetic.     History of anesthetic complications  - PONV.  PONV after ovarian cyst removal in 1988, no problems since then.    ROS/MED HX  ENT/Pulmonary:     (+) sleep apnea (uses a mouth splint, not CPAP),  (-) tobacco use, asthma and recent URI   Neurologic:  - neg neurologic ROS  (-) no seizures and no CVA   Cardiovascular:     (+) -----Previous cardiac testing   Echo: Date:  Results:    Stress Test: Date: Results:    ECG Reviewed: Date: 6/5/20 Results:  SR  Cath: Date: Results:   (-) murmur   METS/Exercise Tolerance: >4 METS    Hematologic:  - neg hematologic  ROS  (-) history of blood clots and history of blood transfusion   Musculoskeletal: Comment: Chronic abdominal pain      GI/Hepatic:     (+) GERD, Asymptomatic on medication, bowel prep,     Renal/Genitourinary:     (+) Nephrolithiasis ,     Endo:     (+) type I DM, Last HgA1c: 6.5, date: 10/7/21, Using insulin, thyroid problem, hypothyroidism,     Psychiatric/Substance Use:     (+) psychiatric history anxiety     Infectious Disease:  - neg infectious disease ROS  (-) Recent Fever   Malignancy:  - neg malignancy ROS     Other:  - neg other ROS    (+) , H/O Chronic Pain,        Physical Exam    Airway        Mallampati: I   TM distance: > 3 FB   Neck ROM: full   Mouth opening: > 3 cm    Respiratory Devices and Support         Dental  no notable dental history         Cardiovascular          Rhythm and rate: regular and normal (-) no murmur    Pulmonary   pulmonary exam normal                OUTSIDE LABS:  CBC:   Lab Results   Component Value Date    WBC 6.1 01/18/2022    WBC 11.0 11/26/2021    HGB 12.2 01/18/2022    HGB 11.8 11/26/2021    HCT 39.0 01/18/2022    HCT 38.2 11/26/2021     01/18/2022     11/26/2021     BMP:   Lab Results   Component Value Date     01/18/2022     11/26/2021    POTASSIUM 3.7 01/18/2022    POTASSIUM 3.8 11/26/2021    CHLORIDE 109 01/18/2022    CHLORIDE 111 (H) 11/26/2021    CO2 27 01/18/2022    CO2 31 11/26/2021    BUN 22 01/18/2022    BUN 9 11/26/2021    CR 0.73 01/18/2022    CR 0.68 11/26/2021    GLC 78 02/01/2022     (H) 01/18/2022     COAGS:   Lab Results   Component Value Date    PTT 36 05/16/2013    INR 1.03 05/10/2016    FIBR 168 (L) 05/10/2013     POC:   Lab Results   Component Value Date     (H) 04/01/2021    HCG Negative 05/21/2014    HCGS Negative  12/30/2015     HEPATIC:   Lab Results   Component Value Date    ALBUMIN 2.5 (L) 11/26/2021    PROTTOTAL 5.7 (L) 11/26/2021    ALT 39 11/26/2021    AST 23 11/26/2021    ALKPHOS 138 11/26/2021    BILITOTAL 0.3 11/26/2021     OTHER:   Lab Results   Component Value Date    PH 7.36 05/10/2013    LACT 0.9 11/23/2021    A1C 6.8 (H) 01/18/2022    VALARIE 8.8 01/18/2022    PHOS 2.9 01/01/2016    MAG 2.0 10/31/2019    LIPASE 10 (L) 11/23/2021    AMYLASE 268 (H) 05/11/2013    TSH 2.75 08/17/2021    T4 1.06 08/17/2021    T3 64 08/17/2021    CRP 21.8 (H) 10/30/2020    SED 13 10/30/2020       Anesthesia Plan    ASA Status:  3   NPO Status:  NPO Appropriate    Anesthesia Type: General.     - Airway: ETT   Induction: Intravenous, Propofol.   Maintenance: Balanced.   Techniques and Equipment:     - Lines/Monitors: 2nd IV     Consents    Anesthesia Plan(s) and associated risks, benefits, and realistic alternatives discussed. Questions answered and patient/representative(s) expressed understanding.    - Discussed:     - Discussed with:  Patient      - Extended Intubation/Ventilatory Support Discussed: No.      - Patient is DNR/DNI Status: No    Use of blood products discussed: Yes.     - Discussed with: Patient.     - Consented: consented to blood products            Reason for refusal: other.     Postoperative Care    Pain management: IV analgesics, Peripheral nerve block (Single Shot).   PONV prophylaxis: Ondansetron (or other 5HT-3), Promethazine or metoclopramide     Comments:    Other Comments: Patient seen and examined by me and available records reviewed. Details as above.  For rectropexy, ALLY BSO due to prolapse.  S/p TPAIT which resolved pain from chronic pancreatitis; has insulin dependent diabetes - took 80% of usual Lantus last pm.  Will avoid steroids for PONV as requested dut to TPAIT. Covid Neg.    Anesthetic options and risks discussed with patient who agrees to proceed.      Devi Capellan MD  2/1/2022  8:07 AM             Devi Capellan MD

## 2022-02-01 NOTE — ANESTHESIA PROCEDURE NOTES
Airway       Patient location during procedure: OR       Procedure Start/Stop Times: 2/1/2022 9:11 AM  Staff -        CRNA: Lloyd Zaragoza APRN CRNA       Other Anesthesia Staff: Ladan Jacobson       Performed By: LETY and CRNAIndications and Patient Condition       Indications for airway management: sammy-procedural         Mask difficulty assessment: 1 - vent by mask    Final Airway Details       Final airway type: endotracheal airway       Successful airway: ETT - single  Endotracheal Airway Details        ETT size (mm): 6.5       Cuffed: yes       Cuff volume (mL): 7       Successful intubation technique: direct laryngoscopy       DL Blade Type: MAC 3       Grade View of Cords: 1       Adjucts: stylet       Position: Right       Measured from: gums/teeth       Secured at (cm): 20       Bite block used: None    Post intubation assessment        Placement verified by: capnometry and equal breath sounds        Number of attempts at approach: 1       Secured with: cloth tape       Ease of procedure: easy       Dentition: Intact    Additional Comments       Intubated by Ladan Jacobson

## 2022-02-01 NOTE — ANESTHESIA POSTPROCEDURE EVALUATION
Patient: Lynn Thompson    Procedure: Procedure(s):  RECTOPEXY, ABDOMINAL  SIGMOIDOSCOPY, FLEXIBLE  Abdominal supracervical hysterectomy, bilateral salpingooophrectomy  Uterosacral ligament suspension, pina colposuspension with Cystoscopy       Diagnosis:Rectal prolapse [K62.3]  Diagnosis Additional Information: No value filed.    Anesthesia Type:  General    Note:  Disposition: Inpatient   Postop Pain Control: Uneventful            Sign Out: Well controlled pain   PONV: No   Neuro/Psych: Uneventful            Sign Out: Acceptable/Baseline neuro status   Airway/Respiratory: Uneventful            Sign Out: Acceptable/Baseline resp. status   CV/Hemodynamics: Uneventful            Sign Out: Acceptable CV status; No obvious hypovolemia; No obvious fluid overload   Other NRE: NONE   DID A NON-ROUTINE EVENT OCCUR? No           Last vitals:  Vitals Value Taken Time   BP 96/44 02/01/22 1530   Temp 36.4  C (97.6  F) 02/01/22 1345   Pulse 61 02/01/22 1535   Resp 16 02/01/22 1530   SpO2 95 % 02/01/22 1535   Vitals shown include unvalidated device data.    Electronically Signed By: Cricket Badillo MD  February 1, 2022  3:36 PM

## 2022-02-01 NOTE — OP NOTE
Procedure Date: 02/01/2022.    PREOPERATIVE DIAGNOSES:  1.  Full-thickness rectal prolapse.  2.  Urogenital prolapse.  3.  Depression.  4.  History of chronic pancreatitis (status post total pancreatectomy with islet autotransplant).  5.  Peptic ulcer disease.  6.  Gastroesophageal reflux disease.  7.  Hypertension.  8.  Post-pancreatectomy diabetes mellitus.  9.  Hypothyroidism.  10.  History of morbid obesity (status post Quinn-en-Y gastric bypass).  11.  Crohn's disease.    POSTOPERATIVE DIAGNOSES:    1.  Full-thickness rectal prolapse.  2.  Urogenital prolapse.  3.  Depression.  4.  History of chronic pancreatitis (status post total pancreatectomy with islet autotransplant).  5.  Peptic ulcer disease.  6.  Gastroesophageal reflux disease.  7.  Hypertension.  8.  Post-pancreatectomy diabetes mellitus.  9.  Hypothyroidism.  10.  History of morbid obesity (status post Quinn-en-Y gastric bypass).  11.  Crohn's disease.    ANESTHESIA:  General endotracheal anesthesia plus bilateral TAP blocks.    PROCEDURES PERFORMED:  1.  Lysis of adhesions (30 minutes).  2.  Posterior sutured rectopexy.  3.  Flexible sigmoidoscopy.  4.  Supracervical hysterectomy with bilateral salpingo-oophorectomy and Gan procedure (per Dr. Rivera).    SURGEON:  Uriah Sheridan MD    ASSISTANT:  Karin Rosas MD (Colon and Rectal Surgery fellow).    BRIEF HISTORY:  Malina Trevino is a 57-year-old pleasant lady with extensive past medical and surgical history as above.  She presented to the colon and rectal surgery clinic with a 1-year history of progressive anal protrusion.  She does have a history of Crohn's disease dating back to 1992 when she underwent an ileal resection for this.  She reported that she had been in remission for approximately 20 years and has not been on any Crohn's medications for this.  Her symptoms were also associated with significant constipation.  Her prolapse is becoming more and more difficult to reduce manually.  She did  develop fecal incontinence as well over the last 6 months and this was mainly with loose stools.  Denied any previous anorectal operations.  She did have one vaginal birth and underwent an episiotomy for this.  She denied any urinary incontinence.  Her pelvic floor testing showed a normal manometry.  A rectoanal inhibitory reflex was present.  EMG showed appropriate increase in activity, although with Valsalva, she demonstrated inappropriate increases as well.  Defecography showed no leakage of rectal contrast initially.  With Valsalva, she was able to evacuate the majority of the contrast.  She easily developed a full-thickness rectal prolapse.  There was perineal descent as well as poor vaginal apical support.  There was no evidence of an enterocele.  She had a previous colonoscopy in 04/2021, where the digital rectal examination was normal.  The terminal ileum appeared normal.  There was evidence of a previous end-to-side ileocolonic anastomosis with no evidence of Crohn's disease or stricture.  There were two small polyps that were removed and these were read as lymphoid aggregates, as well as a hyperplastic polyp.  We had a long discussion with both the urogynecology team as well as the gastroenterology team with regards to appropriate management.  It was clear that given her multiple abdominal operations, we were going to have to do her operation open.  The main concern was her Crohn's disease and with the introduction of possible mesh for ventral rectopexy, I did recommend against this.  Dr. Rivera also recommended against placing mesh in the pelvis given her past medical and surgical history, as well as quiescent Crohn's disease.  I thoroughly discussed the risks, benefits and alternatives of operative treatment with Lynn and she agreed to proceed.    DESCRIPTION OF OPERATION:  After obtaining informed consent, the patient was brought to the operating room and placed in the modified lithotomy position.   "General endotracheal anesthesia was gently induced without difficulty.  She underwent preoperative placement of bilateral TAP blocks.  She also received appropriate preoperative antibiotic prophylaxis, as well as mechanical and chemical DVT prophylaxis.  Bilateral lower extremity pneumatic compression devices were applied, and all pressure points were cushioned.  The abdomen and perineum were prepped and draped in a standard sterile fashion.  A Gonzalez catheter was inserted.  A \"timeout\" was performed.  A lower midline incision was placed and the peritoneal cavity was entered under direct vision.  There were very little adhesions in the pelvis, although there were multiple adhesions to the mid abdomen and upper abdomen.  These were quite dense and adhered to the abdominal wall.  These were all taken down over the course of approximately 30 minutes with sharp dissection.  There was no evidence of any injuries to the intestine after this.  There were some adhesions in the left upper quadrant, but this is where her either G-tube or J-tube was previously placed, and these were not taken down.  We then positioned the patient and placed a large Karthik wound protector.  A Speed-Tract surgical retractor was used for exposure.  The rectosigmoid was dissected all the way down to the sacral promontory, and the entire rectum was mobilized circumferentially all the way down to the pelvic floor.  Dr. Rivera performed supracervical hysterectomy with bilateral salpingo-oophorectomy.  Please see her operative report for full details.  After this, we furthered the circumferential dissection all the way down to the pelvic floor and included some anterior dissection as well.  Hemostasis was corroborated after this.  The rectum was then brought up taut to the sacral promontory and 2-0 Ethibond sutures were used to pexy the right perirectal tissues to the sacral promontory periosteum (U-stitches).  This did not angle or twist or rectum in any " excessive way.  We then performed flexible sigmoidoscopy, and there was no evidence of injuries inside the rectum or sigmoid. Hydropneumatic testing was performed and there was no evidence of air leakage.  There was no evidence of suture material in the rectum or sigmoid.  Dr. Rivera then continued with a Gan procedure.  Please see her operative report for full details.  Instrument, sponge, and needle counts were all correct as reported to me.  Hemostasis was corroborated.  The pelvis was irrigated with approximately 2 liters of warm sterile water and subsequently suctioned out.  The small intestine was reassessed and there was no evidence of any injuries.  The fascia was closed with a running #1 PDS suture.  The wound was irrigated with dilute peroxide.  The skin was reapproximated with a running 4-0 Monocryl subcuticular suture as well as Exofin fusion.  The patient tolerated the procedure well.    COMPLICATIONS:  None immediately.    ESTIMATED BLOOD LOSS:  150 mL    REPLACEMENT:  2000 mL of crystalloid.    DRAINS AND TUBES:  Gonzalez catheter.    SPECIMENS:  Uterus, bilateral fallopian tubes and bilateral ovaries.    FINDINGS:  Dense intra-abdominal adhesions at the anterior midline.  Redundant sigmoid colon.  Normal flexible sigmoidoscopy after rectopexy.    DISPOSITION:  PACU.    Uriah Sheridan MD        D: 2022   T: 2022   MT: [facility]    Name:     ANGEL CARPENTER  MRN:      [id number]        Account:        [id number]   :      1965           Procedure Date: 2022     Document: [id number]    cc:  [facility]illing   Nicole See MD Maryana Moreno MD Elizabeth Vondermarwitz, NP

## 2022-02-01 NOTE — OP NOTE
OPERATIVE REPORT - 2/1/2022    PREOPERATIVE DIAGNOSIS:   1. Full thickness Rectal Prolapse  2. Uterovaginal prolapse, stage II  3.  History of chronic pancreatitis s/p pancreatectomy and islet cell transplant  4. Post pancreatectomy diabetes  5.  History of gastric bypass (gale en Y)  6. Crohns disease  7. Mixed urinary incontinence    POSTOPERATIVE DIAGNOSIS: Same    PROCEDURES PERFORMED:   1. Rectopexy (Colorectal primary)  2. Sigmoidoscopy (Colorectal primary)  3. Supracervical hysterectomy with bilateral salpingooophrectomy  4. Uterosacral ligament suspension  5. Gan colposuspension  6. Cystoscopy    STAFF SURGEON: Nicole Rivera MD    RESIDENT: Farhad Bowers MD    ANESTHESIA: General    ESTIMATED BLOOD LOSS (Urologic Portion): 74 mL    DRAINS: Gonzalez catheter with 10 mL sterile water in balloon    SIGNIFICANT FINDINGS:  - Cystoscopy with no foreign bodies and bilateral ureteral orifices with briskly effluxing orange, pyridium stained urine    OPERATIVE INDICATIONS:   Lynn Thompson is a 57 old female with multiple prior abdominal surgeries, found to have rectal prolapse with plans to undergo open rectopexy with colorectal surgery. During workup of her prolapse, she was also noted to have an enterocele as well as poor apical support. Given this, she was referred to Urology clinic where we discussed additional interventions to be performed at the time of her rectopexy to address these findings. After a discussion of all risks, benefits and alternatives, she elected to proceed with the above listed procedure.     DESCRIPTION OF PROCEDURE:   Please see colorectal surgery operative report for full details for their portion of the procedure:    After obtaining entry to the abdomen and following lysis of adhesions, the colorectal surgery team turned over the case to Urology to perform the supracervical hysterectomy.    The right and left ureters were first identified, ensuring away from the surgical field  during the dissection for the hysterectomy with bilateral salpingooophrectomy. Uterus, fallopian tubes and ovaries appeared normal.  We began our dissection on the right side. Using the ligasure, we first cauterized and ligated the suspensory ligament of the ovary, then continued our dissection down through the round ligament of the uterus. We then proceeded to cauterize and ligate the broad ligament at the right margin of the uterus, extending inferiorly toward the cervix. This dissection was then repeated on the left side. Bilateral uterosacral ligaments were then identified and tagged with sutures for identification following the rectopexy. We dissected the bladder away from the cervix and once the uterine vessels were ligated bovie electrocautery was then used to dissect across the mid cervix, freeing the uterus, fallopian tubes, and ovaries which were passed off for pathologic analysis. Hemostasis was obtained in the dissection bed, and the case was then turned back to the colorectal surgery team for completion of the rectopexy.     Following completion of the rectopexy, we then performed our Gan suspension. First, we addressed the apical support of the vagina and cervix. Bilaterally, 2-0 PDS and 2-0 prolene sutures were used to approximate the uterosacral ligament to the cervical remnant for apical support.  Care was done in placing the sutures as to avoid kinking and injury to the ureters. We then turned our attention to the Gan colposuspension. The bladder was filled and then drained through the keen catheter to assist in identification of the bladder neck and vagina. The peritoneum was then incised with electrocautery superior and anterior to the bladder, and the dissection was carried down towards the pubic bone, and laterally until Sy's ligament was identified. The dissection was carried posteriorly until the vaginal wall, bladder neck and urethra were isolated. 0 Gortex sutures were then placed  bilaterally on the vagina, one each at the level of the bladder neck, and then at the level 2/3 distally down the urethra, and these sutures were passed through Sy's ligament. The sutures were pulled and then tied to approximate the planned suspension, and we noted good apical suspension, as well as good anterior support. Cystoscopy was then performed. There were no foreign bodies visualized in the bladder or urethra, and bilateral ureteral orifices were effluxing clear, orange urine. The cystoscope was removed, a new keen catheter was replaced.    The colorectal surgery team then returned for the closure. The abdomen was irrigated with 2 liters of sterile water, and hemostasis was confirmed. The peritoneum was then closed with 2-0 vicryl placed in a simple running fashion. Fascia was closed with 1-0 PDS placed in a simple running fashion, the closure was washed with hydrogen peroxide solution, and the skin was then closed with 4-0 Monocryl placed in a running subcuticular fashion. Skin adhesive was applied. The patient was then returned to the supine position and awakened from anesthesia. This concluded the procedure. The patient tolerated the procedure without any immediate complications and was transported to the PACU in stable condition    POSTOP PLAN:  1. PACU, then admit to colorectal surgery  2. Keen okay to be removed by primary team when patient medically stable/ready for voiding trial (typically when ambulatory)  3. No heavy lifting for 6 weeks  4. Nothing per vagina for 6 weeks  5. Urology will follow    Addendum:    I, Nicole Rivera, was present and scrubbed for our entire case.  I agree with the note as above with changes made as needed.  I spoke to patient's  and daughter over the phone at the end of the case    Nicole Rivera MD MPH  (she/her/hers)   of Urology  [facility]

## 2022-02-01 NOTE — ANESTHESIA CARE TRANSFER NOTE
Patient: Lynn Thompson    Procedure: Procedure(s):  RECTOPEXY, ABDOMINAL  SIGMOIDOSCOPY, FLEXIBLE  Abdominal supracervical hysterectomy, bilateral salpingooophrectomy  Uterosacral ligament suspension, pina colposuspension with Cystoscopy       Diagnosis: Rectal prolapse [K62.3]  Diagnosis Additional Information: No value filed.    Anesthesia Type:   General     Note:    Oropharynx: oropharynx clear of all foreign objects and spontaneously breathing  Level of Consciousness: awake  Oxygen Supplementation: face mask    Independent Airway: airway patency satisfactory and stable  Dentition: dentition unchanged  Vital Signs Stable: post-procedure vital signs reviewed and stable  Report to RN Given: handoff report given  Patient transferred to: PACU  Comments: Patient transferred to PACU in stable condition, breathing spontaneously on face mask, VSS.  Report given to RN.  Handoff Report: Identifed the Patient, Identified the Reponsible Provider, Reviewed the pertinent medical history, Discussed the surgical course, Reviewed Intra-OP anesthesia mangement and issues during anesthesia, Set expectations for post-procedure period and Allowed opportunity for questions and acknowledgement of understanding      Vitals:  Vitals Value Taken Time   BP     Temp     Pulse 96 02/01/22 1345   Resp     SpO2 99 % 02/01/22 1345   Vitals shown include unvalidated device data.    Electronically Signed By: MITZY Sutton CRNA  February 1, 2022  1:46 PM

## 2022-02-02 ENCOUNTER — APPOINTMENT (OUTPATIENT)
Dept: OCCUPATIONAL THERAPY | Facility: CLINIC | Age: 57
End: 2022-02-02
Attending: COLON & RECTAL SURGERY
Payer: COMMERCIAL

## 2022-02-02 LAB
GLUCOSE BLDC GLUCOMTR-MCNC: 103 MG/DL (ref 70–99)
GLUCOSE BLDC GLUCOMTR-MCNC: 116 MG/DL (ref 70–99)
GLUCOSE BLDC GLUCOMTR-MCNC: 126 MG/DL (ref 70–99)
GLUCOSE BLDC GLUCOMTR-MCNC: 163 MG/DL (ref 70–99)
GLUCOSE BLDC GLUCOMTR-MCNC: 166 MG/DL (ref 70–99)
GLUCOSE BLDC GLUCOMTR-MCNC: 174 MG/DL (ref 70–99)
HGB BLD-MCNC: 9.6 G/DL (ref 11.7–15.7)
HOLD SPECIMEN: NORMAL
MAGNESIUM SERPL-MCNC: 2.4 MG/DL (ref 1.6–2.3)
PHOSPHATE SERPL-MCNC: 4 MG/DL (ref 2.5–4.5)
POTASSIUM BLD-SCNC: 4.4 MMOL/L (ref 3.4–5.3)

## 2022-02-02 PROCEDURE — 84100 ASSAY OF PHOSPHORUS: CPT | Performed by: COLON & RECTAL SURGERY

## 2022-02-02 PROCEDURE — 250N000011 HC RX IP 250 OP 636: Performed by: PHYSICIAN ASSISTANT

## 2022-02-02 PROCEDURE — 999N000147 HC STATISTIC PT IP EVAL DEFER

## 2022-02-02 PROCEDURE — 250N000009 HC RX 250: Performed by: COLON & RECTAL SURGERY

## 2022-02-02 PROCEDURE — 83735 ASSAY OF MAGNESIUM: CPT | Performed by: COLON & RECTAL SURGERY

## 2022-02-02 PROCEDURE — 36415 COLL VENOUS BLD VENIPUNCTURE: CPT | Performed by: COLON & RECTAL SURGERY

## 2022-02-02 PROCEDURE — 97535 SELF CARE MNGMENT TRAINING: CPT | Mod: GO

## 2022-02-02 PROCEDURE — 250N000012 HC RX MED GY IP 250 OP 636 PS 637: Performed by: COLON & RECTAL SURGERY

## 2022-02-02 PROCEDURE — 120N000002 HC R&B MED SURG/OB UMMC

## 2022-02-02 PROCEDURE — 97165 OT EVAL LOW COMPLEX 30 MIN: CPT | Mod: GO

## 2022-02-02 PROCEDURE — 258N000003 HC RX IP 258 OP 636: Performed by: COLON & RECTAL SURGERY

## 2022-02-02 PROCEDURE — 250N000011 HC RX IP 250 OP 636: Performed by: COLON & RECTAL SURGERY

## 2022-02-02 PROCEDURE — 85018 HEMOGLOBIN: CPT | Performed by: COLON & RECTAL SURGERY

## 2022-02-02 PROCEDURE — 97530 THERAPEUTIC ACTIVITIES: CPT | Mod: GO

## 2022-02-02 PROCEDURE — 84132 ASSAY OF SERUM POTASSIUM: CPT | Performed by: COLON & RECTAL SURGERY

## 2022-02-02 PROCEDURE — 250N000013 HC RX MED GY IP 250 OP 250 PS 637: Performed by: COLON & RECTAL SURGERY

## 2022-02-02 RX ORDER — CALCIUM GLUCONATE 20 MG/ML
2 INJECTION, SOLUTION INTRAVENOUS ONCE
Status: COMPLETED | OUTPATIENT
Start: 2022-02-02 | End: 2022-02-02

## 2022-02-02 RX ADMIN — ERTAPENEM SODIUM 1 G: 1 INJECTION, POWDER, LYOPHILIZED, FOR SOLUTION INTRAMUSCULAR; INTRAVENOUS at 08:33

## 2022-02-02 RX ADMIN — ACETAMINOPHEN 1000 MG: 500 TABLET ORAL at 17:05

## 2022-02-02 RX ADMIN — PANCRELIPASE 3 TABLET: 20880; 78300; 78300 TABLET ORAL at 18:00

## 2022-02-02 RX ADMIN — PANTOPRAZOLE SODIUM 40 MG: 40 TABLET, DELAYED RELEASE ORAL at 20:09

## 2022-02-02 RX ADMIN — ACETAMINOPHEN 1000 MG: 500 TABLET ORAL at 08:38

## 2022-02-02 RX ADMIN — CALCIUM GLUCONATE 2 G: 20 INJECTION, SOLUTION INTRAVENOUS at 10:37

## 2022-02-02 RX ADMIN — ACETAMINOPHEN 1000 MG: 500 TABLET ORAL at 13:43

## 2022-02-02 RX ADMIN — INSULIN ASPART 1 UNITS: 100 INJECTION, SOLUTION INTRAVENOUS; SUBCUTANEOUS at 13:59

## 2022-02-02 RX ADMIN — LEVOTHYROXINE SODIUM 100 MCG: 100 TABLET ORAL at 08:38

## 2022-02-02 RX ADMIN — SODIUM CHLORIDE, POTASSIUM CHLORIDE, SODIUM LACTATE AND CALCIUM CHLORIDE: 600; 310; 30; 20 INJECTION, SOLUTION INTRAVENOUS at 18:00

## 2022-02-02 RX ADMIN — ESCITALOPRAM OXALATE 20 MG: 20 TABLET ORAL at 08:38

## 2022-02-02 RX ADMIN — INSULIN GLARGINE 5 UNITS: 100 INJECTION, SOLUTION SUBCUTANEOUS at 22:14

## 2022-02-02 RX ADMIN — ACETAMINOPHEN 1000 MG: 500 TABLET ORAL at 20:09

## 2022-02-02 RX ADMIN — PROCHLORPERAZINE EDISYLATE 10 MG: 5 INJECTION INTRAMUSCULAR; INTRAVENOUS at 21:57

## 2022-02-02 RX ADMIN — DULOXETINE HYDROCHLORIDE 20 MG: 20 CAPSULE, DELAYED RELEASE ORAL at 08:38

## 2022-02-02 RX ADMIN — PROCHLORPERAZINE EDISYLATE 5 MG: 5 INJECTION INTRAMUSCULAR; INTRAVENOUS at 10:56

## 2022-02-02 RX ADMIN — MODAFINIL 400 MG: 100 TABLET ORAL at 08:37

## 2022-02-02 RX ADMIN — ENOXAPARIN SODIUM 40 MG: 40 INJECTION SUBCUTANEOUS at 17:06

## 2022-02-02 RX ADMIN — PANTOPRAZOLE SODIUM 40 MG: 40 TABLET, DELAYED RELEASE ORAL at 08:38

## 2022-02-02 ASSESSMENT — ACTIVITIES OF DAILY LIVING (ADL)
ADLS_ACUITY_SCORE: 10
ADLS_ACUITY_SCORE: 8
ADLS_ACUITY_SCORE: 10
PREVIOUS_RESPONSIBILITIES: MEAL PREP;LAUNDRY;HOUSEKEEPING;SHOPPING;MEDICATION MANAGEMENT;FINANCES;DRIVING
ADLS_ACUITY_SCORE: 10
ADLS_ACUITY_SCORE: 8
ADLS_ACUITY_SCORE: 10
ADLS_ACUITY_SCORE: 10
ADLS_ACUITY_SCORE: 8
ADLS_ACUITY_SCORE: 10
ADLS_ACUITY_SCORE: 8
ADLS_ACUITY_SCORE: 10
ADLS_ACUITY_SCORE: 10
ADLS_ACUITY_SCORE: 8
ADLS_ACUITY_SCORE: 10
ADLS_ACUITY_SCORE: 8
ADLS_ACUITY_SCORE: 10

## 2022-02-02 NOTE — PLAN OF CARE
Physical Therapy: Orders received. Chart reviewed and discussed with care team.? Physical Therapy not indicated due to pt demonstrating functional mobility with SBA and no acute PT needs.? Defer discharge recommendations to OT.? Will complete orders.  Please re-consult PT if any new needs arise.

## 2022-02-02 NOTE — PROGRESS NOTES
02/02/22 0907   Quick Adds   Type of Visit Initial Occupational Therapy Evaluation       Present no   Living Environment   People in home spouse   Current Living Arrangements house   Home Accessibility stairs to enter home;stairs within home   Number of Stairs, Main Entrance 2   Number of Stairs, Within Home, Primary 6  (split level, 2 sets of 6)   Transportation Anticipated car, drives self;family or friend will provide   Living Environment Comments IND at baseline. tub shower. no AE at baseline. supportive family able to A at d/c.    Self-Care   Usual Activity Tolerance good   Current Activity Tolerance moderate   Regular Exercise No   Equipment Currently Used at Home none   Activity/Exercise/Self-Care Comment IND at baseline.    Instrumental Activities of Daily Living (IADL)   Previous Responsibilities meal prep;laundry;housekeeping;shopping;medication management;finances;driving   IADL Comments family able to A    Disability/Function   Hearing Difficulty or Deaf no   Wear Glasses or Blind yes   Vision Management glasses   Concentrating, Remembering or Making Decisions Difficulty no   Difficulty Communicating no   Difficulty Eating/Swallowing no   Walking or Climbing Stairs Difficulty no   Dressing/Bathing Difficulty no   Toileting issues no   Doing Errands Independently Difficulty (such as shopping) no   Fall history within last six months no   Change in Functional Status Since Onset of Current Illness/Injury yes   General Information   Onset of Illness/Injury or Date of Surgery 02/01/22   Referring Physician Fatimah   Patient/Family Therapy Goal Statement (OT) return home   Additional Occupational Profile Info/Pertinent History of Current Problem This is a 57 year old female with h/o total pancreatectomy with auto islet transplantation in 2013 now on insulin, splenectomy, choledochoduodenostomy, gastric bypass, appendectomy, hernia repair, ileal resection in 1992 for possible IBD, and  multiple SBOs, now s/p abd rectopexy, flex sig, supracervical hysterectomy, BSO, uterosacral ligament suspension and Gan colposuspension for rectal and uterovaginal prolapse on 2/1/2022.   Existing Precautions/Restrictions abdominal   Left Upper Extremity (Weight-bearing Status)   (10#)   Right Upper Extremity (Weight-bearing Status)   (10#)   Cognitive Status Examination   Orientation Status orientation to person, place and time   Affect/Mental Status (Cognitive) WNL   Follows Commands WNL   Visual Perception   Visual Impairment/Limitations WNL   Sensory   Sensory Quick Adds No deficits were identified   Pain Assessment   Patient Currently in Pain Yes, see Vital Sign flowsheet   Integumentary/Edema   Integumentary/Edema no deficits were identifed   Posture   Posture not impaired   Range of Motion Comprehensive   General Range of Motion no range of motion deficits identified   Strength Comprehensive (MMT)   General Manual Muscle Testing (MMT) Assessment no strength deficits identified   Muscle Tone Assessment   Muscle Tone Quick Adds No deficits were identified   Coordination   Upper Extremity Coordination No deficits were identified   Clinical Impression   Criteria for Skilled Therapeutic Interventions Met (OT) yes   OT Diagnosis dec IND with ADLs and transfers   OT Problem List-Impairments impacting ADL activity tolerance impaired;pain;post-surgical precautions   Assessment of Occupational Performance 3-5 Performance Deficits   Identified Performance Deficits LE dressing, toilet transfer, tub transfer, stairs   Planned Therapy Interventions (OT) ADL retraining;IADL retraining;transfer training   Clinical Decision Making Complexity (OT) low complexity   Therapy Frequency (OT) 6x/week   Predicted Duration of Therapy 4 days   Risk & Benefits of therapy have been explained evaluation/treatment results reviewed;care plan/treatment goals reviewed;risks/benefits reviewed;current/potential barriers reviewed;participants  voiced agreement with care plan;patient   OT Discharge Planning    OT Discharge Recommendation (DC Rec) Home with assist   OT Rationale for DC Rec anticipate once pain is more controlled pt will be safe to d/c home with family A for heavier IADL tasks.    OT Brief overview of current status  SBA    Total Evaluation Time (Minutes)   Total Evaluation Time (Minutes) 4

## 2022-02-02 NOTE — PLAN OF CARE
"  Plan of Care Note    Reason for Admission: Surgery  Procedures: 02/01/22: RECTOPEXY, ABDOMINAL (N/A Abdomen); SIGMOIDOSCOPY, FLEXIBLE (N/A Anus); Abdominal supracervical hysterectomy, bilateral salpingooophrectomy (N/A Abdomen); Uterosacral ligament suspension, pina colposuspension with Cystoscopy   IV Access/Incisions/Drains/Wounds:   Peripheral IV 02/01/22 Right Lower forearm (Active)   Site Assessment [facility] 02/02/22 0015   Line Status Infusing 02/02/22 0015   Phlebitis Scale 0-->no symptoms 02/02/22 0015   Infiltration Scale 0 02/02/22 0015   Number of days: 1       Peripheral IV 02/01/22 Left Lower forearm (Active)   Site Assessment [facility] 02/02/22 0015   Line Status Saline locked 02/02/22 0015   Phlebitis Scale 0-->no symptoms 02/02/22 0015   Infiltration Scale 0 02/02/22 0015   Number of days: 1       Urethral Catheter Latex;Straight-tip;Double-lumen 16 fr (Active)   Tube Description Positional 02/02/22 0046   Catheter Care Done;Catheter wipes 02/01/22 1712   Collection Container Standard;Patent 02/02/22 0046   Securement Method Other (Comment) 02/02/22 0046   Rationale for Continued Use /GI/GYN Pelvic Procedure 02/02/22 0046   Urine Output 100 mL 02/02/22 0046   Number of days: 1       Wound (used by OP [facility] only) 10/29/20 0359 Left hand abrasion (Active)   Number of days: 461       Incision/Surgical Site 02/01/22 Abdomen (Active)   Incision Assessment WDL 02/02/22 0030   Closure Liquid bandage;Approximated 02/02/22 0030   Incision Drainage Amount None 02/02/22 0030   Dressing Intervention Open to air / No Dressing 02/02/22 0030   Number of days: 1     IVF: LR @ 110 ml/hr (Bag changed at 530)  VS: /48 (BP Location: Right arm, Cuff Size: Adult Small)   Pulse 61   Temp 98.6  F (37  C) (Oral)   Resp 11   Ht 1.626 m (5' 4\")   Wt 51 kg (112 lb 7 oz)   LMP 12/19/2013   SpO2 93%   BMI 19.30 kg/m    Diet: Clear Liquid Diet    Activity: SBA  Pain Management: PCA 0.2mg/q10 (1.2mg/hr)  GI/: Voiding with " keen catheter, -BM, -Flatus, -Nausea  Neuro: A&Ox4  Team: SurgOnc  Pertinent Labs: None      Shift Summary  Patient slept well on shift. Patient said pain is at a comfortable level with PCA in place. Blood pressures have been consistently soft since she arrived on the floor. IS teaching done and tolerating well. Patient had slight crackles on LLL which improved with coughing and IS use. Continue with POC.

## 2022-02-02 NOTE — CONSULTS
Care Management Initial Consult    General Information  Assessment completed with: Patient (by phone),    Type of CM/SW Visit: Initial Assessment  Primary Care Provider verified and updated as needed: Yes   Readmission within the last 30 days:     Advance Care Planning: Advance Care Planning Reviewed: no concerns identified          Communication Assessment  Patient's communication style: spoken language (English or Bilingual)    Hearing Difficulty or Deaf: no   Wear Glasses or Blind: yes    Cognitive  Cognitive/Neuro/Behavioral: WDL                      Living Environment:   People in home: child(laureano), adult,spouse     Current living Arrangements: house      Able to return to prior arrangements: yes       Family/Social Support:  Care provided by: self  Provides care for: no one  Marital Status:     Description of Support System: Supportive,Involved         Current Resources:   Patient receiving home care services: No  Community Resources: None  Equipment currently used at home: none  Supplies currently used at home: Diabetic Supplies    Employment/Financial:  Employment Status: disabled     Financial Concerns: No concerns identified       Functional Status:  Prior to admission patient needed assistance: family and patient share tasks at home. They are able to assist her as needed.             Values/Beliefs:  Spiritual, Cultural Beliefs, Jewish Practices, Values that affect care:  (not discussed)               Additional Information:  Initial assessment completed due to high risk readmission score. Therapy recs home with assist. Per patient  will pick he up at discharge.   Care management will follow for any discharge needs.        Julianna Lange RN, [place]  Float Care Coordinator  Covering 7C [facility]   Pager: [phone]

## 2022-02-02 NOTE — PHARMACY-ADMISSION MEDICATION HISTORY
Admission Medication History Completed by Pharmacy    See Psychiatric Admission Navigator for allergy information, preferred outpatient pharmacy, prior to admission medications and immunization status.     Medication History Sources:     Patient interview completed by PAC PharmD prior to admission (see note dated 1/18/2022); nurse updated last doses pre-op    Changes made to PTA medication list (reason):    Added: None    Deleted: None    Changed: None    Additional Information:    None    Prior to Admission medications    Medication Sig Last Dose Taking? Auth Provider   amylase-lipase-protease (VIOKACE) 67544-48010 units TABS tablet Take 4-5 with meals and 2 with snacks  Patient taking differently: Take 4-5 with big meal  2-3 with small meals and snacks Past Week at Unknown time Yes Adriana Robles MD   calcium carbonate 600 mg-vitamin D 400 units (CALTRATE) 600-400 MG-UNIT per tablet Take 1 tablet by mouth daily Past Month at Unknown time Yes Unknown, Entered By History   Continuous Blood Gluc  (DEXCOM G6 ) MODESTA Use as directed to check blood glucose levels 2/1/2022 at Unknown time Yes Adriana Robles MD   Continuous Blood Gluc Sensor (DEXCOM G6 SENSOR) MISC Change every 10 days 2/1/2022 at Unknown time Yes Adriana Robles MD   Continuous Blood Gluc Transmit (DEXCOM G6 TRANSMITTER) MISC Change every 3 months 2/1/2022 at Unknown time Yes Adriana Robles MD   DULoxetine (CYMBALTA) 20 MG capsule Take 20 mg by mouth daily 2/1/2022 at 0500 Yes Unknown, Entered By History   escitalopram (LEXAPRO) 20 MG tablet Take 20 mg by mouth daily 2/1/2022 at 0600 Yes Unknown, Entered By History   estradiol (ESTRACE) 0.1 MG/GM vaginal cream PLACE 2 GRAMS VAGINALLY 2 TIMES WEEKLY  Patient taking differently: PLACE 2 GRAMS VAGINALLY 2 TIMES WEEKLY  Thursdays and Sundays Past Month at Unknown time Yes Kavitha Spencer DO   famotidine (PEPCID) 40 MG tablet Take 1 tablet (40 mg) by mouth 2 times daily as needed  for heartburn 2/1/2022 at 0600 Yes Maryana Brooks MD   FIASP PENFILL 100 UNIT/ML SOCT Inject 0.5-4 Units Subcutaneous 4 times daily (with meals and nightly)  Patient taking differently: Inject 0.5-4 Units Subcutaneous 3 times daily  1/31/2022 at 2100 Yes Adriana Robles MD   gabapentin (NEURONTIN) 300 MG capsule Take 300 mg by mouth nightly as needed Past Week at Unknown time Yes Unknown, Entered By History   Glucagon (GVOKE HYPOPEN 1-PACK) 1 MG/0.2ML SOAJ Inject 1 mg Subcutaneous once as needed (for hypoglycemia with loss of consciousness) More than a month at Unknown time Yes Adriana Robles MD   glucose 40 % GEL Take 15-30 g by mouth every 15 minutes as needed. More than a month at Unknown time Yes Suzi Short APRN CNP   hydrOXYzine (ATARAX) 25 MG tablet TAKE 1-2 TABLETS BY MOUTH 2 TIMES DAILY AS NEEDED FOR ANXIETY More than a month at Unknown time Yes Reported, Patient   insulin glargine (LANTUS PEN) 100 UNIT/ML pen Inject 6 units daily  Patient taking differently: Inject 5 Units Subcutaneous At Bedtime Inject 6 units daily 1/31/2022 at 2100 Yes Adriana Robles MD   levothyroxine (SYNTHROID/LEVOTHROID) 100 MCG tablet Take 1 tablet (100 mcg) by mouth daily 2/1/2022 at 0600 Yes Maryana Brooks MD   loratadine (CLARITIN) 10 MG tablet Take 10 mg by mouth daily as needed  Past Week at Unknown time Yes Unknown, Entered By History   metroNIDAZOLE (FLAGYL) 500 MG tablet Take 1 tablet (500 mg) by mouth every 6 hours At 8:00 am, 2:00 pm, 8:00 pm the day prior to your surgery with neomycin and zofran. 1/31/2022 at 2000 Yes Uriah Sheridan MD   modafinil (PROVIGIL) 200 MG tablet Take 400 mg by mouth daily Past Week at Unknown time Yes Unknown, Entered By History   neomycin (MYCIFRADIN) 500 MG tablet Take 2 tablets (1,000 mg) by mouth every 6 hours At 8:00 am, 2:00 pm, 8:00 pm the day prior to your surgery with flagyl and zofran. 1/31/2022 at 2100 Yes Uriah Sheridan MD  "  ondansetron (ZOFRAN) 4 MG tablet Take 1 tablet (4 mg) by mouth every 6 hours At 8:00 am, 2:00 pm, 8:00 pm the day prior to your surgery with neomycin and flagyl. 1/31/2022 at 2100 Yes Uriah Sheridan MD   polyethylene glycol (MIRALAX) 17 g packet Take 238 g by mouth See Admin Instructions Starting at 4 pm night prior to surgery. Refer to \"Getting Ready for Surgery\" instructions. 1/31/2022 at Unknown time Yes Uriah Sheridan MD   traZODone (DESYREL) 50 MG tablet Take 50 mg by mouth nightly as needed for sleep Past Week at Unknown time Yes Unknown, Entered By History   insulin pen needle (32G X 4 MM) 32G X 4 MM miscellaneous Use 6 pen needles daily   Adriana Robles MD   omeprazole (PRILOSEC) 40 MG DR capsule Take 1 capsule (40 mg) by mouth 2 times daily Take 30-60 minutes before a meal.   Maryana Brooks MD   STATIN NOT PRESCRIBED (INTENTIONAL) Please choose reason not prescribed from choices below.   Maryana Brooks MD       Date completed: 02/02/22    Medication history completed by: Alicia Arceo, PharmD            "

## 2022-02-02 NOTE — PROGRESS NOTES
"Colorectal Surgery Brief Progress Note  Surgery Cross-Cover  Post Op Check    02/01/2022    Lynn Thompson is a 57 year old female with h/o complex adnexal mass, fistulizing Crohn's disease, now POD#0 lysis of adhesions, total abdominal hysterectomy, bilateral salpingo-oophorectomy, cystoscopy, LAR, ileocecectomy, omentectomy, ileostomy, Meckel's diverticulectomy and a combined case with Guynn/unk and colorectal surgery.    Patient seen at bedside, reports doing well since surgery, pain improving.  Denies nausea/vomiting.  Voiding without difficulty.  No flatus or bowel movement yet.  Has no current concerns.    BP 95/40 (BP Location: Left arm)   Pulse 57   Temp 98  F (36.7  C) (Oral)   Resp 12   Ht 1.626 m (5' 4\")   Wt 51 kg (112 lb 7 oz)   LMP 12/19/2013   SpO2 97%   BMI 19.30 kg/m      Gen: AOx3, NAD, non-toxic appearing  Resp: breathing non-labored  Abdomen: soft, mildly and appropriately tender throughout, non-distended, incisions clean/dry/intact.  Minimal bloodstained bandages.  Extremities: warm and well perfused  Skin: warm, dry    A/P: No acute post-op issues. Continue plan of care per primary team.     Silvestre Lopez MD  PGY1 N Surgery  2/1/2022  [phone]      "

## 2022-02-02 NOTE — PROGRESS NOTES
Admitted/transferred from: PACU    2 RN   skin assessment completed by Brenna Rubalcava, RN and Brittnee HAILE RN    Skin assessment finding:    -no bruising and no redness seen  -right and left PIV  -keen  -mid line incision

## 2022-02-02 NOTE — PROGRESS NOTES
"Urology  Progress Note    - AFeb, VSS  - Pain controlled with pain meds  - NAEON    Exam  /44 (BP Location: Right arm)   Pulse 61   Temp 98.6  F (37  C) (Oral)   Resp 11   Ht 1.626 m (5' 4\")   Wt 51 kg (112 lb 7 oz)   LMP 12/19/2013   SpO2 93%   BMI 19.30 kg/m    No acute distress  Unlabored breathing on room qir  Abdomen soft, nontender, mildly distended. Incisions c/d/i  Gonzalez with clear yellow urine in tubing    /225    Labs  Hgb (11.4)  Cr (0.74)    Assessment/Plan  57 year old female POD#1 s/p open rectopexy, supracervical hysterectomy, uterosacral ligament suspension, and Gan colposuspension for rectal and uteroovaginal prolapse, doing well    - TOV per primary team; notify Urology if difficulty voiding/retention  - has followup with Dr Rivera on 2/24  - Nothing per vagina for 6 weeks  - Urology will follow peripherally, please page/call with questions      Seen and examined with the chief resident. Will discuss with Dr. Rivera.    Farhad Bowers MD  Urology Resident, PGY-3          "

## 2022-02-02 NOTE — PLAN OF CARE
"/45   Pulse 62   Temp 97.8  F (36.6  C) (Temporal)   Resp 16   Ht 1.626 m (5' 4\")   Wt 51 kg (112 lb 7 oz)   LMP 12/19/2013   SpO2 95%   BMI 19.30 kg/m      Neuro: A&Ox4.   Cardiac: /37. Colorectal PA made aware. OVSS  Respiratory: On RA. Denies SOB  GI/: Adequate urine output via keen catheter. No BM   Diet/appetite: Full liquid diet. Compazine given x1 for nausea   Activity: Ambulates in halls with OT. Up with SBA  Pain: Abdomen pain managed with dilaudid pca  Skin: Midline incision with liquid bandage KELL. Placed abdominal binder on   LDA's: PIV x2  infuisng LR at 75ml/hr     Plan: Continue with POC. Notify primary team with changes.    "

## 2022-02-02 NOTE — PROGRESS NOTES
Colorectal Surgery Progress Note  [facility]  POD#1      Subjective:  No significant overnight events. Pt says that she feels fine this morning. Says pain is well controlled with medications, has mostly been sleeping and hasn't drank many liquids. No nausea or vomiting, no gas or BM.     Vitals:  Vitals:    02/02/22 0307 02/02/22 0314 02/02/22 0400 02/02/22 0500   BP: (!) 101/39 104/48 103/46 102/44   BP Location: Right arm Right arm Right arm Right arm   Cuff Size:  Adult Small     Pulse:       Resp:       Temp:       TempSrc:       SpO2:       Weight:       Height:         I/O:  I/O last 3 completed shifts:  In: 3188.33 [P.O.:200; I.V.:2638.33; IV Piggyback:100]  Out: 965 [Urine:765; Blood:200]    Physical Exam:  Gen: AAOx3, NAD  Pulm: Non-labored breathing  Abd: Soft, non-distended, appropriately tender, no guarding   Incision C/D/I   Ext:  Warm and well-perfused    BMP  Recent Labs   Lab 02/02/22  0215 02/01/22  2220 02/01/22  1759 02/01/22  1703 02/01/22  1418 02/01/22  1153 02/01/22  0959 02/01/22  0926   NA  --   --   --   --   --  139  --  143   POTASSIUM  --   --  4.0  --   --  3.3*  --  3.6   CHLORIDE  --   --   --   --   --   --   --  110*   CO2  --   --   --   --   --   --   --  24   BUN  --   --   --   --   --   --   --  18   CR  --   --   --   --   --   --   --  0.74   * 157*  --  197* 129* 142*   < > 90   MAG  --   --  2.2  --   --   --   --   --    PHOS  --   --  4.2  --   --   --   --   --     < > = values in this interval not displayed.     CBC  Recent Labs   Lab 02/01/22  1153   HGB 11.4*         ASSESSMENT: This is a 57 year old female with h/o total pancreatectomy with auto islet transplantation in 2013 now on insulin, splenectomy, choledochoduodenostomy, gastric bypass, appendectomy, hernia repair, ileal resection in 1992 for possible IBD, and multiple SBOs, now s/p abd rectopexy, flex sig, supracervical hysterectomy, BSO, uterosacral ligament suspension  and Gan colposuspension for rectal and uterovaginal prolapse on 2/1/2022.      Neuro/Pain: scheduled APAP, dilaudid PCA, PRN robaxin and gabapentin  CV: blood pressures are a little soft most likely due to hypovolemia. Bleed less likely given benign abdominal exam. Continue IV fluids.  PULM: encourage IS.  GI/FEN: LR at 75 mL/hr, on clear liquid diet. Progress to full liquid or low fiber diet if tolerating liquids this morning. On pancreatic enzyme replacement therapy.   : Remove Keen, monitor urine output  ID: no concerns  Endocrine: PTA insulin and levothyroxine  Other: Continue PTA duloxetine, escitalopram, and modafinil.   Activity: as tolerated.  Ppx: enoxaparin 40 mg  Dispo: 2-4 days pending return of bowel function, normalization of blood pressures, ambulating      Latter-day Marissa, MS3  Colon and Rectal Surgery    Patient was seen and discussed with Dr. Sheridan        Addendum:  Pt was seen and examined independent of the medical student.     S:  Pain relatively controlled.  No N/V.  No Gas/BM.     O:  Vitals, I&Os reviewed  NAD  Norm resp effort  abd soft, minimally tender.  Incisions c/d/i.     A/P:    - tylenol, robaxin, dilaudid PCA, gabapentin, atarax,   - FLD, going slower due to small bowel adhesions intra-op.   - home creon  - LR @ 75  - home lantus 5 U at bedtime and insulin sliding scale  - keeping keen today and likely remove tomorrow.  apprec Uro rec - from Uro perspective ok for TOV.  Nothing per vagina x6 weeks.   - Hgb 9.6 from 11-12.  Recheck Hgb tomorrow.  Ppx:  lovenox ppx  Dispo:  2-3 days pending ROBF, magali PO, overall stability    Anaid Webb PA-C  Colon and Rectal Surgery       Seen and discussed with Dr. Rosas

## 2022-02-02 NOTE — PLAN OF CARE
17:00-23:30    Temp: 98.6  F (37  C) Temp src: Oral BP: 101/45 Pulse: 62   Resp: 11 SpO2: 95 % O2 Device: None (Room air)     Patient is S/P Abdominal supracervical hysterectomy, bilateral salpingo oophorectomy, uterosacral ligament suspension, pina colposuspension with Cystoscopy, Day # 0 ( MD note )    -pain well-managed by PCA Dilaudid  -on Clear diet with no appetite  -denies nausea  -right PIV infusing  -left PIV saline locked  -abdominal incision with liquid bandage C/D/I  -ambulated in the room x 1  -keen with adequate output  - and 157, no coverage given due to lack of appetite    Continue with plan of care.

## 2022-02-03 ENCOUNTER — APPOINTMENT (OUTPATIENT)
Dept: GENERAL RADIOLOGY | Facility: CLINIC | Age: 57
End: 2022-02-03
Attending: COLON & RECTAL SURGERY
Payer: COMMERCIAL

## 2022-02-03 ENCOUNTER — APPOINTMENT (OUTPATIENT)
Dept: OCCUPATIONAL THERAPY | Facility: CLINIC | Age: 57
End: 2022-02-03
Attending: COLON & RECTAL SURGERY
Payer: COMMERCIAL

## 2022-02-03 LAB
ALBUMIN UR-MCNC: 30 MG/DL
APPEARANCE UR: CLEAR
BASOPHILS # BLD AUTO: 0.1 10E3/UL (ref 0–0.2)
BASOPHILS NFR BLD AUTO: 1 %
BILIRUB UR QL STRIP: NEGATIVE
COLOR UR AUTO: YELLOW
EOSINOPHIL # BLD AUTO: 0.1 10E3/UL (ref 0–0.7)
EOSINOPHIL NFR BLD AUTO: 1 %
ERYTHROCYTE [DISTWIDTH] IN BLOOD BY AUTOMATED COUNT: 14.6 % (ref 10–15)
GLUCOSE BLDC GLUCOMTR-MCNC: 107 MG/DL (ref 70–99)
GLUCOSE BLDC GLUCOMTR-MCNC: 161 MG/DL (ref 70–99)
GLUCOSE BLDC GLUCOMTR-MCNC: 207 MG/DL (ref 70–99)
GLUCOSE BLDC GLUCOMTR-MCNC: 316 MG/DL (ref 70–99)
GLUCOSE BLDC GLUCOMTR-MCNC: 83 MG/DL (ref 70–99)
GLUCOSE BLDC GLUCOMTR-MCNC: 92 MG/DL (ref 70–99)
GLUCOSE UR STRIP-MCNC: NEGATIVE MG/DL
HCT VFR BLD AUTO: 28.1 % (ref 35–47)
HGB BLD-MCNC: 8.5 G/DL (ref 11.7–15.7)
HGB BLD-MCNC: 8.5 G/DL (ref 11.7–15.7)
HGB UR QL STRIP: ABNORMAL
IMM GRANULOCYTES # BLD: 0 10E3/UL
IMM GRANULOCYTES NFR BLD: 0 %
KETONES UR STRIP-MCNC: NEGATIVE MG/DL
LEUKOCYTE ESTERASE UR QL STRIP: ABNORMAL
LYMPHOCYTES # BLD AUTO: 2.2 10E3/UL (ref 0.8–5.3)
LYMPHOCYTES NFR BLD AUTO: 28 %
MAGNESIUM SERPL-MCNC: 1.8 MG/DL (ref 1.6–2.3)
MCH RBC QN AUTO: 29.9 PG (ref 26.5–33)
MCHC RBC AUTO-ENTMCNC: 30.2 G/DL (ref 31.5–36.5)
MCV RBC AUTO: 99 FL (ref 78–100)
MONOCYTES # BLD AUTO: 0.9 10E3/UL (ref 0–1.3)
MONOCYTES NFR BLD AUTO: 12 %
MUCOUS THREADS #/AREA URNS LPF: PRESENT /LPF
NEUTROPHILS # BLD AUTO: 4.5 10E3/UL (ref 1.6–8.3)
NEUTROPHILS NFR BLD AUTO: 58 %
NITRATE UR QL: NEGATIVE
NRBC # BLD AUTO: 0 10E3/UL
NRBC BLD AUTO-RTO: 0 /100
PATH REPORT.COMMENTS IMP SPEC: NORMAL
PATH REPORT.COMMENTS IMP SPEC: NORMAL
PATH REPORT.FINAL DX SPEC: NORMAL
PATH REPORT.GROSS SPEC: NORMAL
PATH REPORT.MICROSCOPIC SPEC OTHER STN: NORMAL
PATH REPORT.RELEVANT HX SPEC: NORMAL
PH UR STRIP: 6 [PH] (ref 5–7)
PHOSPHATE SERPL-MCNC: 1.8 MG/DL (ref 2.5–4.5)
PHOSPHATE SERPL-MCNC: 2.7 MG/DL (ref 2.5–4.5)
PHOTO IMAGE: NORMAL
PLATELET # BLD AUTO: 251 10E3/UL (ref 150–450)
POTASSIUM BLD-SCNC: 3.9 MMOL/L (ref 3.4–5.3)
RBC # BLD AUTO: 2.84 10E6/UL (ref 3.8–5.2)
RBC URINE: 18 /HPF
SP GR UR STRIP: 1.01 (ref 1–1.03)
SQUAMOUS EPITHELIAL: 4 /HPF
UROBILINOGEN UR STRIP-MCNC: NORMAL MG/DL
WBC # BLD AUTO: 7.8 10E3/UL (ref 4–11)
WBC URINE: 9 /HPF

## 2022-02-03 PROCEDURE — 88305 TISSUE EXAM BY PATHOLOGIST: CPT | Mod: 26 | Performed by: PATHOLOGY

## 2022-02-03 PROCEDURE — 250N000011 HC RX IP 250 OP 636: Performed by: COLON & RECTAL SURGERY

## 2022-02-03 PROCEDURE — 71046 X-RAY EXAM CHEST 2 VIEWS: CPT | Mod: 26 | Performed by: RADIOLOGY

## 2022-02-03 PROCEDURE — 258N000003 HC RX IP 258 OP 636: Performed by: COLON & RECTAL SURGERY

## 2022-02-03 PROCEDURE — 71046 X-RAY EXAM CHEST 2 VIEWS: CPT

## 2022-02-03 PROCEDURE — 250N000011 HC RX IP 250 OP 636: Performed by: PHYSICIAN ASSISTANT

## 2022-02-03 PROCEDURE — 250N000009 HC RX 250: Performed by: COLON & RECTAL SURGERY

## 2022-02-03 PROCEDURE — 97530 THERAPEUTIC ACTIVITIES: CPT | Mod: GO | Performed by: OCCUPATIONAL THERAPIST

## 2022-02-03 PROCEDURE — 36415 COLL VENOUS BLD VENIPUNCTURE: CPT | Performed by: COLON & RECTAL SURGERY

## 2022-02-03 PROCEDURE — 250N000013 HC RX MED GY IP 250 OP 250 PS 637: Performed by: COLON & RECTAL SURGERY

## 2022-02-03 PROCEDURE — 81001 URINALYSIS AUTO W/SCOPE: CPT | Performed by: COLON & RECTAL SURGERY

## 2022-02-03 PROCEDURE — 84100 ASSAY OF PHOSPHORUS: CPT | Performed by: COLON & RECTAL SURGERY

## 2022-02-03 PROCEDURE — 120N000002 HC R&B MED SURG/OB UMMC

## 2022-02-03 PROCEDURE — 87086 URINE CULTURE/COLONY COUNT: CPT | Performed by: COLON & RECTAL SURGERY

## 2022-02-03 PROCEDURE — 83735 ASSAY OF MAGNESIUM: CPT | Performed by: COLON & RECTAL SURGERY

## 2022-02-03 PROCEDURE — 85025 COMPLETE CBC W/AUTO DIFF WBC: CPT | Performed by: COLON & RECTAL SURGERY

## 2022-02-03 PROCEDURE — 85018 HEMOGLOBIN: CPT | Performed by: COLON & RECTAL SURGERY

## 2022-02-03 PROCEDURE — 84132 ASSAY OF SERUM POTASSIUM: CPT | Performed by: COLON & RECTAL SURGERY

## 2022-02-03 RX ORDER — CALCIUM GLUCONATE 20 MG/ML
2 INJECTION, SOLUTION INTRAVENOUS ONCE
Status: COMPLETED | OUTPATIENT
Start: 2022-02-03 | End: 2022-02-03

## 2022-02-03 RX ORDER — METOCLOPRAMIDE HYDROCHLORIDE 5 MG/ML
5 INJECTION INTRAMUSCULAR; INTRAVENOUS ONCE
Status: COMPLETED | OUTPATIENT
Start: 2022-02-03 | End: 2022-02-03

## 2022-02-03 RX ADMIN — PANTOPRAZOLE SODIUM 40 MG: 40 TABLET, DELAYED RELEASE ORAL at 21:12

## 2022-02-03 RX ADMIN — ESCITALOPRAM OXALATE 20 MG: 20 TABLET ORAL at 08:24

## 2022-02-03 RX ADMIN — INSULIN ASPART 1 UNITS: 100 INJECTION, SOLUTION INTRAVENOUS; SUBCUTANEOUS at 18:13

## 2022-02-03 RX ADMIN — PANTOPRAZOLE SODIUM 40 MG: 40 TABLET, DELAYED RELEASE ORAL at 08:28

## 2022-02-03 RX ADMIN — PANCRELIPASE 4 TABLET: 20880; 78300; 78300 TABLET ORAL at 18:12

## 2022-02-03 RX ADMIN — SODIUM CHLORIDE, POTASSIUM CHLORIDE, SODIUM LACTATE AND CALCIUM CHLORIDE: 600; 310; 30; 20 INJECTION, SOLUTION INTRAVENOUS at 03:24

## 2022-02-03 RX ADMIN — MODAFINIL 400 MG: 100 TABLET ORAL at 08:28

## 2022-02-03 RX ADMIN — SODIUM CHLORIDE, POTASSIUM CHLORIDE, SODIUM LACTATE AND CALCIUM CHLORIDE: 600; 310; 30; 20 INJECTION, SOLUTION INTRAVENOUS at 16:12

## 2022-02-03 RX ADMIN — INSULIN ASPART 2 UNITS: 100 INJECTION, SOLUTION INTRAVENOUS; SUBCUTANEOUS at 12:24

## 2022-02-03 RX ADMIN — PANCRELIPASE 3 TABLET: 20880; 78300; 78300 TABLET ORAL at 12:38

## 2022-02-03 RX ADMIN — ACETAMINOPHEN 1000 MG: 500 TABLET ORAL at 16:20

## 2022-02-03 RX ADMIN — LEVOTHYROXINE SODIUM 100 MCG: 100 TABLET ORAL at 08:24

## 2022-02-03 RX ADMIN — ACETAMINOPHEN 1000 MG: 500 TABLET ORAL at 13:14

## 2022-02-03 RX ADMIN — CALCIUM GLUCONATE 2 G: 20 INJECTION, SOLUTION INTRAVENOUS at 13:06

## 2022-02-03 RX ADMIN — PROCHLORPERAZINE EDISYLATE 5 MG: 5 INJECTION INTRAMUSCULAR; INTRAVENOUS at 04:10

## 2022-02-03 RX ADMIN — POTASSIUM PHOSPHATE, MONOBASIC AND POTASSIUM PHOSPHATE, DIBASIC 15 MMOL: 224; 236 INJECTION, SOLUTION, CONCENTRATE INTRAVENOUS at 10:45

## 2022-02-03 RX ADMIN — ACETAMINOPHEN 1000 MG: 500 TABLET ORAL at 08:24

## 2022-02-03 RX ADMIN — PANCRELIPASE 3 TABLET: 20880; 78300; 78300 TABLET ORAL at 09:50

## 2022-02-03 RX ADMIN — DULOXETINE HYDROCHLORIDE 20 MG: 20 CAPSULE, DELAYED RELEASE ORAL at 08:24

## 2022-02-03 RX ADMIN — PROCHLORPERAZINE EDISYLATE 5 MG: 5 INJECTION INTRAMUSCULAR; INTRAVENOUS at 21:17

## 2022-02-03 RX ADMIN — METOCLOPRAMIDE HYDROCHLORIDE 5 MG: 5 INJECTION INTRAMUSCULAR; INTRAVENOUS at 12:33

## 2022-02-03 ASSESSMENT — ACTIVITIES OF DAILY LIVING (ADL)
ADLS_ACUITY_SCORE: 10
ADLS_ACUITY_SCORE: 6
ADLS_ACUITY_SCORE: 10
ADLS_ACUITY_SCORE: 6
ADLS_ACUITY_SCORE: 10
ADLS_ACUITY_SCORE: 6
ADLS_ACUITY_SCORE: 10
ADLS_ACUITY_SCORE: 6

## 2022-02-03 NOTE — PLAN OF CARE
"[id number]    Blood pressure 120/46, pulse 87, temperature (!) 102.2  F (39  C), temperature source Oral, resp. rate 16, height 1.626 m (5' 4\"), weight 51 kg (112 lb 7 oz), last menstrual period 12/19/2013, SpO2 93 %, not currently breastfeeding.    Activity: SBA  Neuros: Intact. A&O x4.   Cardiac: Increased from pt's normal. Baseline HR is in 50-60bpm and baseline SBP is 100-110.  Respiratory: WDL on RA. Denies SOB. LS CTA.  GI/: Voiding via keen catheter with AUOP. Catheter cares completed. +BS, passing flatus. No BM this shift. Abdomen soft and reasonably tender considering recent surgery.  Diet: Full liquid, tolerating fairly well  Skin: CDI x midline incision  Lines: Left PIV running LR 75 ml/hr and PCA  Incisions/Drains: Midline abdominal incision approximated with liquid bandage.  Labs: BG at 2200 was 163, at 0200 was 107  Pain/nausea: Reports continuous nausea managed by prn Q6H compazine. PCA dilaudid in use.  New changes this shift: Pt's VS this AM were outside of her normal. Primary team made aware.  "

## 2022-02-03 NOTE — PLAN OF CARE
"/46 (BP Location: Left arm)   Pulse 75   Temp 98.3  F (36.8  C) (Oral)   Resp 13   Ht 1.626 m (5' 4\")   Wt 51 kg (112 lb 7 oz)   LMP 12/19/2013   SpO2 97%   BMI 19.30 kg/m      T max 101 at 7am, now Afebrile- 98.3. BP  92/41 ~ 12pm. Colorectal MD made aware. Gonzalez removed and patient voiding adequate amount. UA sent after Gonzalez was removed. CXR done for post op fever. Gave one time 2g IV calcium gluconate and one time 5mg IV reglan. No BM today. Pain managed with dilaudid pca. Up ambulating in halls with Therapy. Phos replaced per protocol. Recheck ordered for 8pm tonight. Up with 1 assist.   "

## 2022-02-03 NOTE — PROGRESS NOTES
Colorectal Surgery Progress Note  [facility]  POD#2      Subjective:  This morning pt had a fever of 102.2. When speaking to her she said she felt fine. Says her pain is well controlled. No chest pain, dyspnea, cough. No new urinary symptoms, urine is clear. Tolerating liquid diet, had some nausea and burping but no vomiting. No gas or BM yet.    Vitals:  Vitals:    02/02/22 1428 02/02/22 2158 02/03/22 0547 02/03/22 0742   BP: 108/45 107/46 120/46    BP Location:  Right arm Right arm    Patient Position:   Semi-Marte's    Cuff Size:       Pulse: 62 63 87    Resp:   16    Temp:  99.1  F (37.3  C) (!) 102.2  F (39  C) (!) 101  F (38.3  C)   TempSrc:  Oral Oral    SpO2:  96% 93%    Weight:       Height:         I/O:  I/O last 3 completed shifts:  In: 2155 [P.O.:960; I.V.:1195]  Out: 3250 [Urine:3250]    Physical Exam:  Gen: AAOx3, NAD  Pulm: Non-labored breathing  Abd: Soft, non-distended, appropriately tender, no guarding   Incision C/D/I, no erythema, warmth, or oozing  Ext:  Warm and well-perfused. Lower extremities are symmetric, non-erythematous, non-tender to palpation     BMP  Recent Labs   Lab 02/03/22  0741 02/03/22  0314 02/02/22  2155 02/02/22  1722 02/02/22  0756 02/02/22  0649 02/01/22  2220 02/01/22  1759 02/01/22  1418 02/01/22  1153 02/01/22  0959 02/01/22  0926   NA  --   --   --   --   --   --   --   --   --  139  --  143   POTASSIUM  --   --   --   --   --  4.4  --  4.0  --  3.3*  --  3.6   CHLORIDE  --   --   --   --   --   --   --   --   --   --   --  110*   CO2  --   --   --   --   --   --   --   --   --   --   --  24   BUN  --   --   --   --   --   --   --   --   --   --   --  18   CR  --   --   --   --   --   --   --   --   --   --   --  0.74   GLC 83 107* 163* 126*   < >  --    < >  --    < > 142*   < > 90   MAG  --   --   --   --   --  2.4*  --  2.2  --   --   --   --    PHOS  --   --   --   --   --  4.0  --  4.2  --   --   --   --     < > = values in this  interval not displayed.     CBC  Recent Labs   Lab 02/02/22  0650 02/01/22  1153   HGB 9.6* 11.4*         ASSESSMENT: This is a 57 year old female with h/o total pancreatectomy with auto islet transplantation in 2013 now on insulin, splenectomy, choledochoduodenostomy, gastric bypass, appendectomy, hernia repair, ileal resection in 1992 for possible IBD, and multiple SBOs, now s/p abd rectopexy, flex sig, supracervical hysterectomy, BSO, uterosacral ligament suspension and Gan colposuspension for rectal and uterovaginal prolapse on 2/1/2022.      Neuro/Pain: scheduled APAP, dilaudid PCA, PRN robaxin and gabapentin  CV: Blood pressures improved from yesterday. Continue fluids.  PULM: encourage IS.  GI/FEN: LR at 75 mL/hr, on full liquid diet. On pancreatic enzyme replacement therapy.   : Remove Keen, monitor urine output  ID:  Febrile to Tmax 102.2 . Discontinue keen, UA/UC, CXR and CBC with diff ordered  Endocrine: PTA insulin and levothyroxine  Other: Continue PTA duloxetine, escitalopram, and modafinil.   Activity: as tolerated.  Ppx: enoxaparin 40 mg  Dispo: 2-4 days pending return of bowel function, normalization of blood pressures, resolution of fevers      Moisés Ann, MS3  Colon and Rectal Surgery    I agree with the documentation provided by the medical student above and was present for the history and physical exam. I made updates or corrections as appropriate.     Kota Cárdenas MD  General Surgery PGY 1 Resident  Pager: [phone] (6am - 5pm. Please page on call resident outside of these hours).    Patient was seen and discussed with Dr. Rosas

## 2022-02-03 NOTE — PROVIDER NOTIFICATION
Colorectal Paged    7C, Rm [id number], Jay BARRETO  Temp: 102.2, Pulse: 87, BP: 120/46, SpO2: 93% on RA.  Mami COVARRUBIAS [id number]

## 2022-02-04 ENCOUNTER — APPOINTMENT (OUTPATIENT)
Dept: OCCUPATIONAL THERAPY | Facility: CLINIC | Age: 57
End: 2022-02-04
Attending: COLON & RECTAL SURGERY
Payer: COMMERCIAL

## 2022-02-04 LAB
BACTERIA UR CULT: NO GROWTH
GLUCOSE BLDC GLUCOMTR-MCNC: 116 MG/DL (ref 70–99)
GLUCOSE BLDC GLUCOMTR-MCNC: 134 MG/DL (ref 70–99)
GLUCOSE BLDC GLUCOMTR-MCNC: 195 MG/DL (ref 70–99)
GLUCOSE BLDC GLUCOMTR-MCNC: 201 MG/DL (ref 70–99)
HGB BLD-MCNC: 8.5 G/DL (ref 11.7–15.7)
MAGNESIUM SERPL-MCNC: 1.6 MG/DL (ref 1.6–2.3)
POTASSIUM BLD-SCNC: 4.2 MMOL/L (ref 3.4–5.3)

## 2022-02-04 PROCEDURE — 250N000011 HC RX IP 250 OP 636: Performed by: PHYSICIAN ASSISTANT

## 2022-02-04 PROCEDURE — 120N000002 HC R&B MED SURG/OB UMMC

## 2022-02-04 PROCEDURE — 250N000011 HC RX IP 250 OP 636

## 2022-02-04 PROCEDURE — 250N000013 HC RX MED GY IP 250 OP 250 PS 637: Performed by: COLON & RECTAL SURGERY

## 2022-02-04 PROCEDURE — 36415 COLL VENOUS BLD VENIPUNCTURE: CPT | Performed by: COLON & RECTAL SURGERY

## 2022-02-04 PROCEDURE — 85018 HEMOGLOBIN: CPT | Performed by: COLON & RECTAL SURGERY

## 2022-02-04 PROCEDURE — 84132 ASSAY OF SERUM POTASSIUM: CPT | Performed by: COLON & RECTAL SURGERY

## 2022-02-04 PROCEDURE — 97530 THERAPEUTIC ACTIVITIES: CPT | Mod: GO

## 2022-02-04 PROCEDURE — 83735 ASSAY OF MAGNESIUM: CPT | Performed by: COLON & RECTAL SURGERY

## 2022-02-04 RX ORDER — OXYCODONE HYDROCHLORIDE 5 MG/1
5-10 TABLET ORAL
Status: DISCONTINUED | OUTPATIENT
Start: 2022-02-04 | End: 2022-02-05 | Stop reason: HOSPADM

## 2022-02-04 RX ORDER — LEVOFLOXACIN 250 MG/1
250 TABLET, FILM COATED ORAL DAILY
Status: DISCONTINUED | OUTPATIENT
Start: 2022-02-04 | End: 2022-02-05 | Stop reason: HOSPADM

## 2022-02-04 RX ORDER — HYDROMORPHONE HCL IN WATER/PF 6 MG/30 ML
.2-.4 PATIENT CONTROLLED ANALGESIA SYRINGE INTRAVENOUS
Status: DISCONTINUED | OUTPATIENT
Start: 2022-02-04 | End: 2022-02-05

## 2022-02-04 RX ADMIN — ACETAMINOPHEN 1000 MG: 500 TABLET ORAL at 07:32

## 2022-02-04 RX ADMIN — ACETAMINOPHEN 1000 MG: 500 TABLET ORAL at 21:22

## 2022-02-04 RX ADMIN — LEVOTHYROXINE SODIUM 100 MCG: 100 TABLET ORAL at 07:33

## 2022-02-04 RX ADMIN — ACETAMINOPHEN 1000 MG: 500 TABLET ORAL at 12:07

## 2022-02-04 RX ADMIN — OXYCODONE HYDROCHLORIDE 5 MG: 5 TABLET ORAL at 21:22

## 2022-02-04 RX ADMIN — MODAFINIL 400 MG: 100 TABLET ORAL at 07:32

## 2022-02-04 RX ADMIN — INSULIN ASPART 2 UNITS: 100 INJECTION, SOLUTION INTRAVENOUS; SUBCUTANEOUS at 17:51

## 2022-02-04 RX ADMIN — DULOXETINE HYDROCHLORIDE 20 MG: 20 CAPSULE, DELAYED RELEASE ORAL at 07:32

## 2022-02-04 RX ADMIN — PANTOPRAZOLE SODIUM 40 MG: 40 TABLET, DELAYED RELEASE ORAL at 21:22

## 2022-02-04 RX ADMIN — PANCRELIPASE 4 TABLET: 20880; 78300; 78300 TABLET ORAL at 12:07

## 2022-02-04 RX ADMIN — PANTOPRAZOLE SODIUM 40 MG: 40 TABLET, DELAYED RELEASE ORAL at 07:32

## 2022-02-04 RX ADMIN — LEVOFLOXACIN 250 MG: 250 TABLET, FILM COATED ORAL at 08:12

## 2022-02-04 RX ADMIN — PROCHLORPERAZINE EDISYLATE 5 MG: 5 INJECTION INTRAMUSCULAR; INTRAVENOUS at 11:14

## 2022-02-04 RX ADMIN — ACETAMINOPHEN 1000 MG: 500 TABLET ORAL at 16:11

## 2022-02-04 RX ADMIN — PANCRELIPASE 4 TABLET: 20880; 78300; 78300 TABLET ORAL at 07:32

## 2022-02-04 RX ADMIN — ENOXAPARIN SODIUM 40 MG: 40 INJECTION SUBCUTANEOUS at 12:07

## 2022-02-04 RX ADMIN — ESCITALOPRAM OXALATE 20 MG: 20 TABLET ORAL at 07:32

## 2022-02-04 RX ADMIN — OXYCODONE HYDROCHLORIDE 5 MG: 5 TABLET ORAL at 12:07

## 2022-02-04 RX ADMIN — OXYCODONE HYDROCHLORIDE 5 MG: 5 TABLET ORAL at 08:12

## 2022-02-04 RX ADMIN — PANCRELIPASE 4 TABLET: 20880; 78300; 78300 TABLET ORAL at 17:51

## 2022-02-04 ASSESSMENT — ACTIVITIES OF DAILY LIVING (ADL)
ADLS_ACUITY_SCORE: 6

## 2022-02-04 NOTE — PLAN OF CARE
Occupational Therapy Discharge Summary    Reason for therapy discharge:    All goals and outcomes met, no further needs identified.    Progress towards therapy goal(s). See goals on Care Plan in [facility] electronic health record for goal details.  Goals met. Pt is IND w/ mobility and functional transfers. Pt able to complete all ADLs w/ SBA and demonstrates good adherence to post-surgical abdominal precautions.     Therapy recommendation(s):    No further therapy is recommended. Recommend assist for heavier I/ADLs to adhere to precautions.

## 2022-02-04 NOTE — PLAN OF CARE
15:00-23:30    Temp: 98.3  F (36.8  C) Temp src: Oral BP: 110/46 Pulse: 75   Resp: 13 SpO2: 97 % O2 Device: None (Room air)      Patient is S/P Abdominal supracervical hysterectomy, bilateral salpingo oophorectomy, uterosacral ligament suspension, pina colposuspension with Cystoscopy, Day # 2 ( MD note )     -pain well-managed by PCA Dilaudid  -on Full liquid diet with good appetite  -C/O nausea, given Compazine with relief  -BS hypoactive, negative BM, negative flatus  -right PIV infusing  -left PIV saline locked  -abdominal incision with liquid bandage C/D/I  -ambulated in the hallways x 2  -voiding spontaneously with good urine output  - and 316, covered x 2  -phos 2.7 after replacement     Continue with plan of care.

## 2022-02-04 NOTE — PLAN OF CARE
Shift note: [id number]  A&O x4. Up ad xenia. Full liquids. 3/10 abdominal incisional pain controlled with Dilaudid PCA. Mild nausea, BS hypoactive. Declined need for antiemetics overnight.  RPIV infusing PCA and LR @ 50 ml/hr.LPIV SL. Abdominal incision CDI with derma bond. Voiding spontaneously.   Potassium, Mag, and Phosphorus protocols, recheck this am.

## 2022-02-04 NOTE — PLAN OF CARE
Vital signs stable. Pt up ad xenia. Voids spont with adequate urine output. No gas, no BM. Pain controlled with oxycodone x2 and Tylenol. Insulin given per sliding scale. Compazine given x1 for nausea otherwise tolerating low fiber diet. Continue plan of care.

## 2022-02-04 NOTE — PROGRESS NOTES
Colorectal Surgery Progress Note  [facility]  POD#3      Subjective:  No significant overnight events. Pt says she feels good this morning. Pain is well controlled. Has been eating more with full liquid diet without nausea or vomiting. Voiding spontaneously with no dysuria. Still has not passed gas or had BM. Does not feel febrile. Does not report dyspnea, new cough, chest pain.    Vitals:  Vitals:    02/03/22 1232 02/03/22 1340 02/03/22 1454 02/03/22 2221   BP: 92/41 99/47 110/46 111/44   BP Location:  Right arm Left arm Left arm   Patient Position:       Pulse: 78 76 75 69   Resp:   13 16   Temp:   98.3  F (36.8  C) 99.4  F (37.4  C)   TempSrc:   Oral Oral   SpO2:  94% 97% 95%   Weight:       Height:         I/O:  I/O last 3 completed shifts:  In: 2212.59 [P.O.:960; I.V.:1252.59]  Out: 3050 [Urine:3050]    Physical Exam:  Gen: AAOx3, NAD  Pulm: Non-labored breathing  Abd: Soft, non-distended, appropriately tender, no guarding   Incision C/D/I, no erythema, warmth  Ext:  Warm and well-perfused.    BMP  Recent Labs   Lab 02/03/22  2130 02/03/22  2056 02/03/22  1706 02/03/22  1126 02/03/22  0827 02/03/22  0741 02/03/22  0730 02/02/22  0756 02/02/22  0649 02/01/22  2220 02/01/22  1759 02/01/22  1418 02/01/22  1153 02/01/22  0959 02/01/22  0926   NA  --   --   --   --   --   --   --   --   --   --   --   --  139  --  143   POTASSIUM  --   --   --   --   --   --  3.9  --  4.4  --  4.0  --  3.3*  --  3.6   CHLORIDE  --   --   --   --   --   --   --   --   --   --   --   --   --   --  110*   CO2  --   --   --   --   --   --   --   --   --   --   --   --   --   --  24   BUN  --   --   --   --   --   --   --   --   --   --   --   --   --   --  18   CR  --   --   --   --   --   --   --   --   --   --   --   --   --   --  0.74   *  --  161* 207* 92   < >  --    < >  --    < >  --    < > 142*   < > 90   MAG  --   --   --   --   --   --  1.8  --  2.4*  --  2.2  --   --   --   --    PHOS  --   2.7  --   --   --   --  1.8*  --  4.0  --  4.2  --   --   --   --     < > = values in this interval not displayed.     CBC  Recent Labs   Lab 02/03/22  0730 02/02/22  0650 02/01/22  1153   WBC 7.8  --   --    HGB 8.5*  8.5* 9.6* 11.4*   HCT 28.1*  --   --      --   --          ASSESSMENT: This is a 57 year old female with h/o total pancreatectomy with auto islet transplantation in 2013 now on insulin, splenectomy, choledochoduodenostomy, gastric bypass, appendectomy, hernia repair, ileal resection in 1992 for possible IBD, and multiple SBOs, now s/p abd rectopexy, flex sig, supracervical hysterectomy, BSO, uterosacral ligament suspension and Gan colposuspension for rectal and uterovaginal prolapse on 2/1/2022.      Neuro/Pain: scheduled APAP, discontinue dilaudid PCA, PRN robaxin and gabapentin  CV: no new concerns; Post op hypotension resolved with adequate fluid resuscitation   PULM: encourage IS.  GI/FEN: Discontinue IV fluids. Advance to LRD. On pancreatic enzyme replacement therapy.   Heme: Acute Blood Loss Anemia with Dilutional Component - stable, no transfusion indicated   : Gonzalez removed; Monitor urine output  ID:  Levaquin started re urine   Endocrine: PTA insulin and levothyroxine  Other: Continue PTA duloxetine, escitalopram, and modafinil.   Activity: as tolerated.  Ppx: enoxaparin 40 mg  Dispo: 2-4 days pending return of bowel function, normalization of blood pressures, resolution of fevers      Moisés Ann, MS3  Colon and Rectal Surgery    I agree with the documentation provided by the medical student above and was present for the history and physical exam. I made updates or corrections as appropriate.     Kota Cárdenas MD  General Surgery PGY 1 Resident  Pager: [phone] (6am - 5pm. Please page on call resident outside of these hours).      Patient was seen and discussed with Dr. Rosas

## 2022-02-05 VITALS
RESPIRATION RATE: 18 BRPM | HEART RATE: 66 BPM | BODY MASS INDEX: 19.2 KG/M2 | HEIGHT: 64 IN | DIASTOLIC BLOOD PRESSURE: 57 MMHG | SYSTOLIC BLOOD PRESSURE: 121 MMHG | TEMPERATURE: 99.1 F | WEIGHT: 112.43 LBS | OXYGEN SATURATION: 95 %

## 2022-02-05 LAB
GLUCOSE BLDC GLUCOMTR-MCNC: 104 MG/DL (ref 70–99)
GLUCOSE BLDC GLUCOMTR-MCNC: 339 MG/DL (ref 70–99)
GLUCOSE BLDC GLUCOMTR-MCNC: 68 MG/DL (ref 70–99)

## 2022-02-05 PROCEDURE — 250N000011 HC RX IP 250 OP 636: Performed by: PHYSICIAN ASSISTANT

## 2022-02-05 PROCEDURE — 250N000013 HC RX MED GY IP 250 OP 250 PS 637: Performed by: COLON & RECTAL SURGERY

## 2022-02-05 RX ORDER — ONDANSETRON 2 MG/ML
4 INJECTION INTRAMUSCULAR; INTRAVENOUS EVERY 6 HOURS PRN
Status: DISCONTINUED | OUTPATIENT
Start: 2022-02-05 | End: 2022-02-05 | Stop reason: HOSPADM

## 2022-02-05 RX ORDER — LEVOFLOXACIN 250 MG/1
250 TABLET, FILM COATED ORAL DAILY
Qty: 4 TABLET | Refills: 0 | Status: SHIPPED | OUTPATIENT
Start: 2022-02-06 | End: 2022-02-10

## 2022-02-05 RX ORDER — POLYETHYLENE GLYCOL 3350 17 G/17G
17 POWDER, FOR SOLUTION ORAL 2 TIMES DAILY
Status: DISCONTINUED | OUTPATIENT
Start: 2022-02-05 | End: 2022-02-05 | Stop reason: HOSPADM

## 2022-02-05 RX ORDER — HYDROMORPHONE HCL IN WATER/PF 6 MG/30 ML
0.2 PATIENT CONTROLLED ANALGESIA SYRINGE INTRAVENOUS
Status: DISCONTINUED | OUTPATIENT
Start: 2022-02-05 | End: 2022-02-05 | Stop reason: HOSPADM

## 2022-02-05 RX ORDER — POLYETHYLENE GLYCOL 3350 17 G/17G
17 POWDER, FOR SOLUTION ORAL 2 TIMES DAILY
Qty: 510 G | Refills: 1 | Status: SHIPPED | OUTPATIENT
Start: 2022-02-05 | End: 2022-05-08

## 2022-02-05 RX ORDER — METHOCARBAMOL 500 MG/1
500 TABLET, FILM COATED ORAL 3 TIMES DAILY PRN
Qty: 15 TABLET | Refills: 0 | Status: SHIPPED | OUTPATIENT
Start: 2022-02-05 | End: 2022-02-23

## 2022-02-05 RX ORDER — OXYCODONE HYDROCHLORIDE 5 MG/1
5-10 TABLET ORAL
Qty: 15 TABLET | Refills: 0 | Status: SHIPPED | OUTPATIENT
Start: 2022-02-05 | End: 2022-04-16

## 2022-02-05 RX ORDER — ACETAMINOPHEN 500 MG
500 TABLET ORAL EVERY 6 HOURS
COMMUNITY
Start: 2022-02-05 | End: 2022-04-16

## 2022-02-05 RX ADMIN — OXYCODONE HYDROCHLORIDE 5 MG: 5 TABLET ORAL at 12:25

## 2022-02-05 RX ADMIN — ESCITALOPRAM OXALATE 20 MG: 20 TABLET ORAL at 08:25

## 2022-02-05 RX ADMIN — PANCRELIPASE 4 TABLET: 20880; 78300; 78300 TABLET ORAL at 08:25

## 2022-02-05 RX ADMIN — PROCHLORPERAZINE EDISYLATE 10 MG: 5 INJECTION INTRAMUSCULAR; INTRAVENOUS at 05:34

## 2022-02-05 RX ADMIN — DULOXETINE HYDROCHLORIDE 20 MG: 20 CAPSULE, DELAYED RELEASE ORAL at 08:25

## 2022-02-05 RX ADMIN — OXYCODONE HYDROCHLORIDE 5 MG: 5 TABLET ORAL at 05:34

## 2022-02-05 RX ADMIN — ACETAMINOPHEN 1000 MG: 500 TABLET ORAL at 12:25

## 2022-02-05 RX ADMIN — ACETAMINOPHEN 1000 MG: 500 TABLET ORAL at 08:25

## 2022-02-05 RX ADMIN — MODAFINIL 400 MG: 100 TABLET ORAL at 08:24

## 2022-02-05 RX ADMIN — POLYETHYLENE GLYCOL 3350 17 G: 17 POWDER, FOR SOLUTION ORAL at 09:02

## 2022-02-05 RX ADMIN — OXYCODONE HYDROCHLORIDE 5 MG: 5 TABLET ORAL at 01:09

## 2022-02-05 RX ADMIN — LEVOTHYROXINE SODIUM 100 MCG: 100 TABLET ORAL at 08:25

## 2022-02-05 RX ADMIN — LEVOFLOXACIN 250 MG: 250 TABLET, FILM COATED ORAL at 08:25

## 2022-02-05 RX ADMIN — PANTOPRAZOLE SODIUM 40 MG: 40 TABLET, DELAYED RELEASE ORAL at 08:25

## 2022-02-05 ASSESSMENT — ACTIVITIES OF DAILY LIVING (ADL)
ADLS_ACUITY_SCORE: 6

## 2022-02-05 NOTE — PLAN OF CARE
POD4. VSS. Pt reports abdominal pain, controlled with Oxy x3, Tylenol x1, & rest. Incision with dermabond KELL. 0200 B (MD notified), extra 3 units of Novolog given. Tolerating low fiber diet, some nausea this AM, relieved with Compazine x1. BS hypoactive, pt reports passing gas, still no BM. Pt up independently, voiding fine per pt report.

## 2022-02-05 NOTE — PLAN OF CARE
Pt discharged to home. Discharge instructions discussed with pt and paperwork signed. PIV's removed. Medications picked up at discharge pharmacy. All belongings with Pt.

## 2022-02-06 NOTE — DISCHARGE SUMMARY
[facility]  Discharge Summary  Colon and Rectal Surgery     Lynn Thompson MRN# [id number]   YOB: 1965 Age: 57 year old     Date of Admission:  2/1/2022  Date of Discharge::  2/5/2022 12:58 PM  Admitting Physician:  Uriah Sheridan MD  Discharge Physician:    Primary Care Physician:        Maryana Brooks          Admission Diagnoses:   Rectal prolapse [K62.3]          Discharge Diagnosis:   Rectal prolapse [K62.3]         Procedures:   1. Posterior suture rectopexy (Colorectal primary)  2. Sigmoidoscopy (Colorectal primary)  3. Supracervical hysterectomy with bilateral salpingooophrectomy (Uro)  4. Uterosacral ligament suspension (Uro)  5. Gan colposuspension (Uro)  6. Cystoscopy (Uro)          Consultations:   OCCUPATIONAL THERAPY ADULT IP CONSULT  PHYSICAL THERAPY ADULT IP CONSULT  CARE MANAGEMENT / SOCIAL WORK IP CONSULT         Imaging Studies:     Results for orders placed or performed during the hospital encounter of 02/01/22   POC US Guidance Needle Placement    Impression    Bilateral quadratus lumborum block   XR Chest 2 Views    Narrative    EXAM: XR CHEST 2 VW  2/3/2022 8:52 AM     HISTORY:  Postop fever       COMPARISON:  Chest x-ray 2/28/2021.    FINDINGS:   And lateral views of the chest. Pneumoperitoneum observed under the  right hemidiaphragm with trace and pneumobilia. Stable  cardiomediastinal silhouette. Trachea midline. No focal airspace  opacities or appreciable pneumothorax. Trace bilateral pleural  effusions. Degenerative changes of the visualized spine. Post surgical  changes of pancreatic transplant with visualized upper abdominal  surgical clips. Cholecystectomy surgical clips. Multiple air-fluid  levels over the visualized abdomen.      Impression    IMPRESSION:  1. Pneumoperitoneum (observed in the right hemidiaphragm) and trace  pneumobilia in the postoperative setting.  2. Patient was previously noted to  have small bowel obstruction, best  evaluated on CT chest abdomen and pelvis 11/23/2021, appearing  similar, likely related to postoperative ileus.  3. Trace pleural effusions.    I have personally reviewed the examination and initial interpretation  and I agree with the findings.    SAIMA SAUCEDO MD         SYSTEM ID:  [id number]     *Note: Due to a large number of results and/or encounters for the requested time period, some results have not been displayed. A complete set of results can be found in Results Review.              Medications Prior to Admission:     No medications prior to admission.              Discharge Medications:     Discharge Medication List as of 2/5/2022 12:04 PM      START taking these medications    Details   acetaminophen (TYLENOL) 500 MG tablet Take 1 tablet (500 mg) by mouth every 6 hours, OTC      levofloxacin (LEVAQUIN) 250 MG tablet Take 1 tablet (250 mg) by mouth daily for 4 days, Disp-4 tablet, R-0, Local Print      methocarbamol (ROBAXIN) 500 MG tablet Take 1 tablet (500 mg) by mouth 3 times daily as needed for muscle spasms, Disp-15 tablet, R-0, Local Print      oxyCODONE (ROXICODONE) 5 MG tablet Take 1-2 tablets (5-10 mg) by mouth every 3 hours as needed for moderate to severe pain, Disp-15 tablet, R-0, Local Print         CONTINUE these medications which have CHANGED    Details   polyethylene glycol (MIRALAX) 17 GM/Dose powder Take 17 g by mouth 2 times daily, Disp-510 g, R-1, Local Print         CONTINUE these medications which have NOT CHANGED    Details   amylase-lipase-protease (VIOKACE) 45829-56314 units TABS tablet Take 4-5 with meals and 2 with snacks, Disp-500 tablet, R-11, E-Prescribe      calcium carbonate 600 mg-vitamin D 400 units (CALTRATE) 600-400 MG-UNIT per tablet Take 1 tablet by mouth daily, Historical      Continuous Blood Gluc  (DEXCOM G6 ) MODESTA Use as directed to check blood glucose levels, Disp-1 each, R-1, Local Print      Continuous Blood  Gluc Sensor (DEXCOM G6 SENSOR) MISC Change every 10 days, Disp-3 each, R-11, Local Print      Continuous Blood Gluc Transmit (DEXCOM G6 TRANSMITTER) MISC Change every 3 months, Disp-1 each, R-3, Local Print      DULoxetine (CYMBALTA) 20 MG capsule Take 20 mg by mouth daily, Historical      escitalopram (LEXAPRO) 20 MG tablet Take 20 mg by mouth daily, Historical      estradiol (ESTRACE) 0.1 MG/GM vaginal cream PLACE 2 GRAMS VAGINALLY 2 TIMES WEEKLYDisp-42.5 g, [id number]      famotidine (PEPCID) 40 MG tablet Take 1 tablet (40 mg) by mouth 2 times daily as needed for heartburn, Disp-180 tablet, R-3, E-Prescribe      FIASP PENFILL 100 UNIT/ML SOCT Inject 0.5-4 Units Subcutaneous 4 times daily (with meals and nightly), Disp-15 mL, R-5, AMBER, E-Prescribe      gabapentin (NEURONTIN) 300 MG capsule Take 300 mg by mouth nightly as needed, Historical      Glucagon (GVOKE HYPOPEN 1-PACK) 1 MG/0.2ML SOAJ Inject 1 mg Subcutaneous once as needed (for hypoglycemia with loss of consciousness), Disp-15 mL, R-5, E-Prescribe      glucose 40 % GEL Take 15-30 g by mouth every 15 minutes as needed.Disp-1 Tube, [id number]      hydrOXYzine (ATARAX) 25 MG tablet TAKE 1-2 TABLETS BY MOUTH 2 TIMES DAILY AS NEEDED FOR ANXIETY, R-0, Historical      insulin glargine (LANTUS PEN) 100 UNIT/ML pen Inject 6 units daily, Disp-15 mL, R-3, E-PrescribeIf Lantus is not covered by insurance, may substitute Basaglar at same dose and frequency.        insulin pen needle (32G X 4 MM) 32G X 4 MM miscellaneous Use 6 pen needles dailyDisp-100 each, [id number]      levothyroxine (SYNTHROID/LEVOTHROID) 100 MCG tablet Take 1 tablet (100 mcg) by mouth daily, Disp-90 tablet, R-3, E-Prescribe      loratadine (CLARITIN) 10 MG tablet Take 10 mg by mouth daily as needed , Historical      modafinil (PROVIGIL) 200 MG tablet Take 400 mg by mouth daily, Historical      omeprazole (PRILOSEC) 40 MG DR capsule Take 1 capsule (40 mg) by mouth 2 times daily Take  30-60 minutes before a meal., Disp-180 capsule, R-3, E-Prescribe      ondansetron (ZOFRAN) 4 MG tablet Take 1 tablet (4 mg) by mouth every 6 hours At 8:00 am, 2:00 pm, 8:00 pm the day prior to your surgery with neomycin and flagyl., Disp-3 tablet, R-0, E-Prescribe      STATIN NOT PRESCRIBED (INTENTIONAL) Reason Statin was Not Prescribed: Drug interaction (valid drug-drug interactions include HIV protease inhibitors, nefazodone, cyclosporine, gemfibrozil, and danazol)No Print Out      traZODone (DESYREL) 50 MG tablet Take 50 mg by mouth nightly as needed for sleep, Historical         STOP taking these medications       metroNIDAZOLE (FLAGYL) 500 MG tablet Comments:   Reason for Stopping:         neomycin (MYCIFRADIN) 500 MG tablet Comments:   Reason for Stopping:                        Brief History of Illness:   56-year-old female with history of total pancreatectomy with islet L autotransplant in 2013, splenectomy, choledochoduodenostomy, gastric bypass, appendectomy, ileal resection in 1992 for Crohn's disease, hernia repair, lysis of adhesions x2, and a small bowel obstruction in 2015.  She reports that her Crohn's has been in remission for over 20 years and she has not needed to be on any medications for this.  About a year ago she had significant constipation and thinks she developed a rectal prolapse at that time.  This has been coming out about once every week to 2 weeks but lasts about 3 days.  She had difficulty getting it back in last week.  She has discomfort but no significant pain when it is out.  She is starting to have difficulty holding bowel movements.  She is leaking loose stools.  She provides several very good pictures of a significant full-thickness rectal prolapse.  She has never had any anorectal surgeries in the past.  She has had 1 vaginal birth of a 4 pound 3 ounce baby and did have an episiotomy.  She denies any urinary incontinence or vaginal or uterine prolapse symptoms.            "Hospital Course:   Post-operatively pt was gently fluid resuscitated.  Gonzalez was removed and pt was able to void.  Pt had eventual return of bowel function and was able to tolerate a low fiber diet. Her insulin was adjusted as her diet was advanced and returned to her usual home regimen at discharge.    Post-operative pain was controlled with scheduled tylenol, ibuprofen, gabapentin, robaxin and prn oxycodone/dilaudid.     Patient is to follow up in the Colon and Rectal Surgery Clinic in 2-3 week with Sabrina Lopez NP and then with Dr. Sheridan in 2-3 weeks after.          Day of Discharge Physical Exam:   Blood pressure 121/57, pulse 66, temperature 99.1  F (37.3  C), temperature source Temporal, resp. rate 18, height 1.626 m (5' 4\"), weight 51 kg (112 lb 7 oz), last menstrual period 12/19/2013, SpO2 95 %, not currently breastfeeding.    Gen: AAOx3, NAD  Pulm: Non-labored breathing  Abd: Soft, appropriately tender, no guarding   Incision C/D/I  Ext:  Warm and well-perfused         Final Pathology Result:   No pathology submitted           Discharge Instructions and Follow-Up:     Discharge Procedure Orders   Reason for your hospital stay   Order Comments: You were admitted after surgery for rectal prolapse.     Activity   Order Comments: Your activity upon discharge: no lifting or strenuous exercise for 6 weeks. No driving until off narcotics.     Order Specific Question Answer Comments   Is discharge order? Yes      Adult [facility] Follow-up and recommended labs and tests   Order Comments: Follow up with primary care provider, Maryana Brooks, within 7 days to evaluate after surgery.  The following labs/tests are recommended: follow up of glucose control.      Appointments on [place] and/or [facility] (with [facility] provider or service). Call [phone] if you haven't heard regarding these appointments within 7 days of discharge.     Discharge Instructions   Order Comments: " DIET  -Regular Diet as tolerated  -We recommend eating slowly, chewing thoroughly, eating small frequent meals throughout the day  -Stay well hydrated.      ACTIVITY  -No lifting, pushing, pulling greater than 10 lbs and no strenuous exercise for 6 weeks   -Avoid straining, bearing down nor any valsalva maneuvers   -Do not insert anything into vagina, anus or rectum for at least 6 weeks or until discussed with Urology or Colorectal Surgery   -No driving while on narcotic analgesics (i.e. Percocet, oxycodone, Vicodin)  -No driving until you are able to fully twist to both sides or slam on brakes quickly and without any pain  -We encourage walking at least 4-5 times per day    WOUND CARE  -Inspect your wounds daily for signs of infection (increased redness, drainage, pain)  -Keep your wound clean and dry  -You may shower, but do not soak in tub or pool    NOTIFY  Please contact Erin Olivas RN, Abby Dumas RN or Silvia RENEE at [phone] for problems after discharge such as:  -Temperature > 101F, chills, rigors, dizziness  -Redness around or purulent drainage from wound  -Inability to tolerate diet, nausea or vomiting  -You stop passing gas, develop significant bloating, abdominal pain  -Have blood in stools/vomit  -Have severe diarrhea/constipation  -Any other questions or concerns.  - At nights (after 4:30pm), on weekends, or if urgent, call [phone] and ask the  to speak with the on-call Colorectal Surgery resident or fellow      Medication Instructions  Some of your medications may have changed. Please take only prescribed and resumed medications     FOLLOW-UP  1.  You will need to follow-up with Sabrina Lopez NP in the Colon and Rectal Surgery clinic in 2-3 week(s) and then with [facility] Staff: Dr. Uriah Sheridan in 4-5 weeks after.  Please contact our clinic scheduler (phone # [phone]) if you have not heard from our clinic in 3 business days afer discharge to schedule a follow-up  appointment.     2.  Take Miralax twice per day.  Can intermittently hold Miralax for loose stools or decrease dose.      3. Follow up with your primary care provider in 1-2 weeks after discharge from the hospital to review this hospitalization.     Diet   Order Comments: Follow this diet upon discharge: Orders Placed This Encounter      Snacks/Supplements Adult: Ensure Clear; Between Meals      Low Fiber Diet     Order Specific Question Answer Comments   Is discharge order? Yes             Home Health Care:     Not needed           Discharge Disposition:     Discharged to home      Condition at discharge: Stable      Coco Dwyer MD   Colon and Rectal Surgery Fellow    Pt was discussed with Dr. Sheridan on 02/05/22

## 2022-02-07 ENCOUNTER — PATIENT OUTREACH (OUTPATIENT)
Dept: SURGERY | Facility: CLINIC | Age: 57
End: 2022-02-07
Payer: COMMERCIAL

## 2022-02-07 NOTE — PROGRESS NOTES
Post Op Note     Called to check on patient postoperatively after hospital discharge.     Patient is s/p rectopexy ALLY/BSO with Dr. Uriah Sheridan for rectal prolapse.   Admitted 2/1/2022 and discharged on 2/5/2022.      Pain is well controlled with tylenol and oxycodone.     Patient is eating and drinking normally. Patient is on a regular diet.  Encouraged patient to drink 8-10 glasses of water a day.     Patient is passing flats, is having loose bowel movements. She is not straining.     Patient is voiding normally and urine is light in color.    Patient is not set up with home care.     Patient Denies nausea and vomiting.    Patient Denies any fevers or chills.    Patient's incision is C/D/I. Patient reminded NOT to remove any dressings over their incisions.     Glucose: has been exactly as it was before surgery. She has no concerns about hypoglycemia or hyperglycemia.     Patient is on a activity restriction. Lifting 10 pounds for 6 weeks.     Patient Denies needing any forms completed.     Follow up is set up with Sabrina Rutledge NP on 2/23/2022 and with Dr. Uriah Sheridan on 3/21/2022.   Encouraged the patient to contact the clinic in the meantime with any fevers, chills, nausea, vomiting, increased colostomy output, no colostomy output, dizziness, lightheadedness, uncontrolled pain, changes to the incisions, or with any questions or concerns.    Patient's questions were answered to their stated satisfaction and they are in agreement with this plan.    SATISH Khan [phone]  Colon & Rectal Surgery Clinic  [facility]

## 2022-02-08 ENCOUNTER — PATIENT OUTREACH (OUTPATIENT)
Dept: SURGERY | Facility: CLINIC | Age: 57
End: 2022-02-08
Payer: COMMERCIAL

## 2022-02-08 DIAGNOSIS — B37.31 YEAST INFECTION OF THE VAGINA: Primary | ICD-10-CM

## 2022-02-08 RX ORDER — FLUCONAZOLE 100 MG/1
100 TABLET ORAL DAILY
Qty: 3 TABLET | Refills: 0 | Status: SHIPPED | OUTPATIENT
Start: 2022-02-08 | End: 2022-04-21

## 2022-02-08 NOTE — PROGRESS NOTES
Received a VM from pt stated that she had a yeast infection post antibiotics. Discussed with . Na for diflucan 100mg daily x3 days. Sent script to requested pharmacy and updated pt.

## 2022-02-09 DIAGNOSIS — K62.3 RECTAL PROLAPSE: Primary | ICD-10-CM

## 2022-02-09 RX ORDER — OXYCODONE HYDROCHLORIDE 5 MG/1
5 TABLET ORAL EVERY 6 HOURS PRN
Qty: 15 TABLET | Refills: 0 | Status: SHIPPED | OUTPATIENT
Start: 2022-02-09 | End: 2022-04-16

## 2022-02-15 DIAGNOSIS — Z90.410 POST-PANCREATECTOMY DIABETES (H): ICD-10-CM

## 2022-02-15 DIAGNOSIS — E13.9 POST-PANCREATECTOMY DIABETES (H): ICD-10-CM

## 2022-02-15 DIAGNOSIS — E89.1 POST-PANCREATECTOMY DIABETES (H): ICD-10-CM

## 2022-02-15 NOTE — TELEPHONE ENCOUNTER
Tier 2 Case Request received to schedule a combo surgery with Dr. Uriah Sheridan and Dr. Nicole Rivera.    Spoke with Dr. Rivera's  Loly, the\ next date when we can get both surgeons together is 2/1/2022. HOLD placed on Dr. Sheridan's calendar on that date.    Additional Instructions for the Case  Multi-surgeon case:  Yes, Dr Rivera and she will put in her own orders   Time in minutes:  240  Anesthesia: General  Prep: Full w/ abx  Pre-Op: PAC  Labs: yes  WOC: no  Special equipment: no  Special Instructions: no    Schedule with Sabrina Lopez NP 2 weeks after surgery.  Schedule with Surgeon 3-4 weeks after Sabrina Lopez NP           Visit Information    Have you changed or started any medications since your last visit including any over-the-counter medicines, vitamins, or herbal medicines? no   Are you having any side effects from any of your medications? -  no  Have you stopped taking any of your medications? Is so, why? -  no    Have you seen any other physician or provider since your last visit? No  Have you had any other diagnostic tests since your last visit? No  Have you been seen in the emergency room and/or had an admission to a hospital since we last saw you? No  Have you had your routine dental cleaning in the past 6 months? no    Have you activated your [employer] account? If not, what are your barriers?  No:      Patient Care Team:  Melissa Lora MD as PCP - General (Family Medicine)  Alejandro Vu MD as PCP - [facility]    Medical History Review  Past Medical, Family, and Social History reviewed and does not contribute to the patient presenting condition    Health Maintenance   Topic Date Due    Diabetic foot exam  Never done    Diabetic retinal exam  Never done    Hepatitis B vaccine (1 of 3 - Risk 3-dose series) Never done    Flu vaccine (1) 09/01/2021    Diabetic microalbuminuria test  04/03/2022    Lipid screen  04/03/2022    Potassium monitoring  04/03/2022    Creatinine monitoring  04/03/2022    Depression Screen  04/22/2022    COVID-19 Vaccine (3 - Booster for Pfizer series) 06/12/2022    A1C test (Diabetic or Prediabetic)  10/26/2022    Colon cancer screen fecal DNA test (Cologuard)  05/10/2024    DTaP/Tdap/Td vaccine (2 - Td or Tdap) 08/25/2026    Pneumococcal 0-64 years Vaccine (2 of 2 - PPSV23) 08/23/2034    Shingles Vaccine  Completed    Hepatitis C screen  Completed    HIV screen  Completed    Hepatitis A vaccine  Aged Out    Hib vaccine  Aged Out    Meningococcal (ACWY) vaccine  Aged Out

## 2022-02-16 ENCOUNTER — PRE VISIT (OUTPATIENT)
Dept: UROLOGY | Facility: CLINIC | Age: 57
End: 2022-02-16
Payer: MEDICARE

## 2022-02-16 RX ORDER — INSULIN GLARGINE 100 [IU]/ML
INJECTION, SOLUTION SUBCUTANEOUS
Qty: 15 ML | Refills: 0 | OUTPATIENT
Start: 2022-02-16

## 2022-02-16 NOTE — TELEPHONE ENCOUNTER
Reason for visit: post op      Relevant information: Uterosacral ligament suspension    Records/imaging/labs/orders: all records available    Pt called: no need for a call    At Rooming: collect a urine/pvr, undress for exam

## 2022-02-17 ENCOUNTER — OFFICE VISIT (OUTPATIENT)
Dept: TRANSPLANT | Facility: CLINIC | Age: 57
End: 2022-02-17
Attending: PEDIATRICS
Payer: COMMERCIAL

## 2022-02-17 VITALS
OXYGEN SATURATION: 99 % | SYSTOLIC BLOOD PRESSURE: 138 MMHG | BODY MASS INDEX: 20.29 KG/M2 | WEIGHT: 118.2 LBS | HEART RATE: 63 BPM | DIASTOLIC BLOOD PRESSURE: 62 MMHG

## 2022-02-17 DIAGNOSIS — E13.9 POST-PANCREATECTOMY DIABETES (H): ICD-10-CM

## 2022-02-17 DIAGNOSIS — E89.1 POST-PANCREATECTOMY DIABETES (H): ICD-10-CM

## 2022-02-17 DIAGNOSIS — Z90.410 POST-PANCREATECTOMY DIABETES (H): ICD-10-CM

## 2022-02-17 PROCEDURE — 99214 OFFICE O/P EST MOD 30 MIN: CPT | Performed by: PEDIATRICS

## 2022-02-17 NOTE — PROGRESS NOTES
[facility]  Islet Autotransplant, Diabetes Follow Up    Problem List:  Patient Active Problem List   Diagnosis     Iron deficiency anemia secondary to inadequate dietary iron intake     Gastric bypass status for obesity     Health Care Home     Chronic pain syndrome     Exocrine pancreatic insufficiency     Asplenia     BLU (obstructive sleep apnea)     S/P exploratory laparotomy     Dysthymia     Anxiety     Thyroid nodule     Acquired hypothyroidism     Post-pancreatectomy diabetes (H)     Other iron deficiency anemia     Uterovaginal prolapse, unspecified     Rectal prolapse     Small bowel obstruction (H)     Acute kidney injury (H)       HPI:  Lynn is a 57 year old female here for follow up o total pancreatectomy, islet cell autotransplant, splenectomy, liver biopsy (needle core), choledochoduodenostomy, feeding jejunostomy performed on 5/10/2013.  At the time of the procedure, the patient received 178,100 IEQ, or 3,209 IEQ/kg body weight. She has had two subsequent exploratory laparatomy for small bowel obstruction. Other notable endocrine history includes a complex cystic nodule in mid right thyroid lobe with 1 cm maximum diameter (saw Dr Adorno in 11/2016) which needs annual follow up U/S in Nov 2017, and mild hypothyroidism followed by PCP.  She had an episode of cholangitis and was hospitalized for 4 days 8/24/17 (fevers, RUQ pain, + Ecoli blood cx).    She was on NO insulin at her 1 year, 2 year, 3 year, 4 year transplant anniversaries and restart insulin just after 4 years post-tx, insulin restart date of  6/6/2017.   She had a slow opioid wean off suboxone but eventually did come off.     At today's visit, overall Lynn's interim history is notable for surgical procedure since I last saw her.  Admitted from 2/1- 2/5 for  1. Posterior suture rectopexy (Colorectal primary)  2. Sigmoidoscopy (Colorectal primary)  3. Supracervical hysterectomy with bilateral  salpingooophrectomy (Uro)  4. Uterosacral ligament suspension (Uro)  5. Gan colposuspension (Uro)  6. Cystoscopy (Uro)        1)  Diabetes:  Lynn continues to have partial islet graft function.   She is on Fiasp right before meal time and has rapid gastric emptying. She uses the pen for half unit doses.   She continues on a Dexcom but I am unable to access data in the clarity system today.  I think her Clarity was logged out on her phone and her phone is not connected to wifi here when she logged back in. She also has her transmitter stopped now b/c her sensor  this morning.  She has however been using her Dexcom and she tells me her numbers are remaining fairly consistent after surgery.  Still gets some intermittent lows.     Lantus 5 units per day  Novolog (Fiasp) 0.5 units per 10g, and correction scale of 0.5 u per 100 >150 (3 injections per day with meals)--     Mixed meal shows islet graft fx.      Recent A1c:  Lab Results   Component Value Date    A1C 6.8 2022    A1C 6.5 10/07/2021    A1C 7.0 2021    A1C 6.8 05/10/2021    A1C 6.9 2021    A1C 6.7 2020    A1C 7.1 2020    A1C 6.9 2020         Hypoglycemia history:   Recent history as above.  The patient has had NO episodes of severe hypoglycemia (seizure, coma, or neuroglycopenic symptoms severe enough to require assistance from another person).  Blood sugars were reviewed from the patient records and/or the meter download.      Uses Dexcom G6- unable to download today for technical reasons.   Did review inpatient BGs that were  mg/dL.  Mostly these were 100s but the variability came towards end of hospital admit on clear liquid diet.        Patient is good candidate for CGM therapy.  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   --  Require frequent adjustments of the insulin treatment regimen, based on therapeutic CGM test results      2)  New issues:  Weight loss has been a problem but she has actually stabilize since summer.  Discussed dietitian consult. .     Review of systems:  A complete Review Of Systems was performed but was negative except as noted in the HPI.    Past Medical and Surgical History:  Past Medical History:   Diagnosis Date     Benign paroxysmal positional vertigo     occ.      Calculus of kidney 05/2005    x1 on L side passed, several stones.  Has been tested for oxalate.     Chronic abdominal pain 2013     Chronic pain      Chronic pancreatitis 2013     Depression     also occ panic spells     Depressive disorder      Diabetes (H) 5/10/2013    Total Pancreatomy with Auto Islets Transplant     Dyspepsia 1999    H. pylori   treated     Headaches     still periodic HA's ;  often 5X/week     Hypertension 2016    Stress related     Iron deficiency anemia secondary to inadequate dietary iron intake 2003    relates to gastric bypass     Post-pancreatectomy diabetes melltius 2013     Sleep apnea     uses splint     Spasm of sphincter of Oddi     surgical + endoscopic stenting of pancreatic duct @ [facility] [place] [facility] 06     Thyroid nodule 2016     Vaccination not carried out      Past Surgical History:   Procedure Laterality Date     ABDOMINOPLASTY  2002    Tummy tuck     APPENDECTOMY  1990     BUNIONECTOMY Right 1998     CBD Stent placement  2002    CBD stent; Dr. Presley      SECTION       CHOLECYSTECTOMY       COLONOSCOPY   &     colonoscopy     COLONOSCOPY       COLONOSCOPY N/A 3/31/2021    Procedure: COLONOSCOPY INCOMPLETE Aborted due to incomplete  prep  will need to take additional prep and return tomorrow 4/1/21;  Surgeon: Ihsan Saenz MD;  Location: [facility]     COMBINED CYSTOSCOPY, RETROGRADES, URETEROSCOPY, LASER HOLMIUM LITHOTRIPSY URETER(S), INSERT STENT Right 03/23/2015    Procedure: COMBINED CYSTOSCOPY, RETROGRADES, URETEROSCOPY, LASER HOLMIUM LITHOTRIPSY URETER(S), INSERT STENT;  Surgeon: Kennedi Aldana MD;  Location: [facility]     COMBINED CYSTOSCOPY, RETROGRADES, URETEROSCOPY, LASER HOLMIUM LITHOTRIPSY URETER(S), INSERT STENT Right 04/20/2015    Procedure: COMBINED CYSTOSCOPY, RETROGRADES, URETEROSCOPY, LASER HOLMIUM LITHOTRIPSY URETER(S), INSERT STENT;  Surgeon: Kennedi Aldana MD;  Location: [facility]     COSMETIC SURGERY  6/24/2002    Tummy tuck     CYSTECTOMY OVARIAN BENIGN Right      CYSTOSCOPY, RETROGRADES, INSERT STENT URETER(S), COMBINED  10/02/2012    Procedure: COMBINED CYSTOSCOPY, RETROGRADES, INSERT STENT URETER(S);  COMBINED CYSTOSCOPY,  , INSERT LEFT STENT URETER;  Surgeon: Johny Baez MD;  Location: [facility]     ESOPHAGOSCOPY, GASTROSCOPY, DUODENOSCOPY (EGD), COMBINED N/A 01/24/2018    Procedure: COMBINED ESOPHAGOSCOPY, GASTROSCOPY, DUODENOSCOPY (EGD);  ESOPHAGOSCOPY, GASTROSCOPY, DUODENOSCOPY (EGD)    ;  Surgeon: Tamir Rodgers MD;  Location: [facility]     EXTRACORPOREAL SHOCK WAVE LITHOTRIPSY (ESWL)  10/16/2012    Procedure: EXTRACORPOREAL SHOCK WAVE LITHOTRIPSY (ESWL);  left EXTRACORPOREAL SHOCK WAVE LITHOTRIPSY (ESWL) ;  Surgeon: Johny Baez MD;  Location: [facility]     Gastric bypass NOS       HERNIA REPAIR  02/2015     HYSTERECTOMY SUPRACERVICAL, BILATERAL SALPINGO-OOPHORECTOMY, COMBINED N/A 2/1/2022    Procedure: Abdominal supracervical hysterectomy, bilateral salpingooophrectomy;  Surgeon: Nicole Rivera MD;  [facility]     IRRIGATION AND DEBRIDEMENT HAND, COMBINED Left 10/30/2020    Procedure: Left hand sharp excisional debridement of skin, subcutaneous tissue and fat with a scalpel, 2 x 1 x 1  cm.;  Surgeon: Demian Renteria MD;  Location: [facility]     LAP, LYSIS OF ADHESIONS       LAPAROSCOPIC LYSIS ADHESIONS N/A 2015    Procedure: LAPAROSCOPIC LYSIS ADHESIONS;  Surgeon: Aaron Early MD;  [facility]     LAPAROSCOPIC LYSIS ADHESIONS N/A 2015    Procedure: LAPAROSCOPIC LYSIS ADHESIONS;  Surgeon: Aaron Early MD;  [facility]     PANCREATECTOMY, TRANSPLANT AUTO ISLET CELL, COMBINED  05/10/2013    Procedure: COMBINED PANCREATECTOMY, TRANSPLANT AUTO ISLET CELL;  Pancreatectomy, Auto Islet Cell Transplant   hernia repair, jejunostomy tube and liver biopsies with Anesthesia General with block;  Surgeon: Aaron Early MD;  [facility]     Partial ileum resection       RECTOPEXY ABDOMINAL N/A 2022    Procedure: RECTOPEXY, ABDOMINAL;  Surgeon: Uriah Sheridan MD;  [facility]     REPAIR PTOSIS BROW BILATERAL Bilateral 2020    Procedure: BILATERAL BROW PTOSIS REPAIR;  Surgeon: Denise Alberts MD;  Location: [facility]     SACROCOLPOPEXY, CYSTOSCOPY, COMBINED N/A 2022    Procedure: Uterosacral ligament suspension, pina colposuspension with Cystoscopy;  Surgeon: Nicole Rivera MD;  [facility]     SIGMOIDOSCOPY FLEXIBLE N/A 2022    Procedure: SIGMOIDOSCOPY, FLEXIBLE;  Surgeon: Uriah Sheridan MD;  [facility]     Surgery for SBO       TONSILLECTOMY, ADENOIDECTOMY, COMBINED  1997     TRANSPLANT  5/10/13    Pancreatic Auto-Islet Transplant       Family History:  New changes since last visit:  none  Family History   Problem Relation Age of Onset     Family History Negative Mother      Respiratory Father         COPD;  at 69     Genitourinary Problems Father         kidney stones     Substance Abuse Father      Depression Father      Asthma Father      Heart Disease Paternal Grandfather         M.I.     Coronary Artery Disease Paternal Grandfather      Hyperlipidemia Paternal Grandfather      Genitourinary Problems  Brother         multiple brothers with kidney stones     Gastrointestinal Disease Maternal Grandmother         undiagnosed 'gut' issues     Coronary Artery Disease Maternal Grandfather      Hyperlipidemia Maternal Grandfather      Cerebrovascular Disease Paternal Grandmother         At the age of 103     Anxiety Disorder Paternal Grandmother      Osteoporosis Paternal Grandmother      Anxiety Disorder Son      Anxiety Disorder Daughter      Asthma Daughter      Colon Cancer No family hx of        Social History:  Social History     Social History Narrative     Not on file     Does not work currently.  Mom to many foster dogs     Physical Exam:  Vitals: /62   Pulse 63   Wt 53.6 kg (118 lb 3.2 oz)   LMP 12/19/2013   SpO2 99%   Breastfeeding No   BMI 20.29 kg/m    BMI= Body mass index is 20.29 kg/m .  General:  Appearance is normal, no acute distress  HEENT:  NC/AT, sclera appear white  Neck:  No obvious thyromegaly  CV/Lungs:  Non distressed breathing  Skin:  No apparent rashes.   Neuro:  Normal mental status  Psych:  Normal affect    Results  Mixed Meal Test:  C Peptide   Date Value Ref Range Status   10/07/2021 1.4 0.9 - 6.9 ng/mL Final   10/07/2021 1.2 0.9 - 6.9 ng/mL Final   09/03/2021 0.3 (L) 0.9 - 6.9 ng/mL Final   08/20/2020 3.4 0.9 - 6.9 ng/mL Final   08/20/2020 1.5 0.9 - 6.9 ng/mL Final   08/20/2020 0.5 (L) 0.9 - 6.9 ng/mL Final   05/16/2019 1.8 0.9 - 6.9 ng/mL Final   05/16/2019 1.5 0.9 - 6.9 ng/mL Final     Glucose   Date Value Ref Range Status   02/01/2022 90 70 - 99 mg/dL Final   01/18/2022 123 (H) 70 - 99 mg/dL Final   11/26/2021 139 (H) 70 - 99 mg/dL Final   11/25/2021 134 (H) 70 - 99 mg/dL Final   11/24/2021 253 (H) 70 - 99 mg/dL Final   05/10/2021 169 (H) 70 - 99 mg/dL Final   03/01/2021 141 (H) 70 - 99 mg/dL Final   02/28/2021 120 (H) 70 - 99 mg/dL Final   02/16/2021 106 (H) 70 - 99 mg/dL Final   10/30/2020 126 (H) 70 - 99 mg/dL Final     GLUCOSE BY METER POCT   Date Value Ref Range  Status   02/05/2022 104 (H) 70 - 99 mg/dL Final   02/05/2022 68 (L) 70 - 99 mg/dL Final   02/05/2022 339 (H) 70 - 99 mg/dL Final   02/04/2022 201 (H) 70 - 99 mg/dL Final   02/04/2022 195 (H) 70 - 99 mg/dL Final       Hemoglobin A1c  Lab Results   Component Value Date    A1C 6.8 01/18/2022    A1C 6.5 10/07/2021    A1C 7.0 09/03/2021    A1C 6.8 05/10/2021    A1C 6.9 02/16/2021    A1C 6.7 08/20/2020    A1C 7.1 06/05/2020    A1C 6.9 02/06/2020       Liver enzymes  Lab Results   Component Value Date    AST 23 11/26/2021    AST 24 05/10/2021     Lab Results   Component Value Date    ALT 39 11/26/2021    ALT 35 05/10/2021     Lab Results   Component Value Date    BILICONJ 0.0 05/12/2014      Lab Results   Component Value Date    BILITOTAL 0.3 11/26/2021    BILITOTAL 0.3 05/10/2021     Lab Results   Component Value Date    ALBUMIN 2.5 11/26/2021    ALBUMIN 3.4 05/10/2021     Lab Results   Component Value Date    PROTTOTAL 5.7 11/26/2021    PROTTOTAL 6.9 05/10/2021      Lab Results   Component Value Date    ALKPHOS 138 11/26/2021    ALKPHOS 132 05/10/2021       Creatinine:  Creatinine   Date Value Ref Range Status   02/01/2022 0.74 0.52 - 1.04 mg/dL Final   05/10/2021 0.81 0.52 - 1.04 mg/dL Final   ]    Complete Blood Count:  CBC RESULTS:   Recent Labs   Lab Test 02/04/22  0748 02/03/22  0730   WBC  --  7.8   RBC  --  2.84*   HGB 8.5* 8.5*  8.5*   HCT  --  28.1*   MCV  --  99   MCH  --  29.9   MCHC  --  30.2*   RDW  --  14.6   PLT  --  251       Cholesterol  Lab Results   Component Value Date    CHOL 188 10/07/2021    CHOL 192 09/17/2020     Lab Results   Component Value Date    HDL 90 10/07/2021    HDL 91 09/17/2020     Lab Results   Component Value Date    LDL 81 10/07/2021    LDL 84 09/17/2020     Lab Results   Component Value Date    TRIG 83 10/07/2021    TRIG 83 09/17/2020     Lab Results   Component Value Date    CHOLHDLRATIO 1.9 11/20/2014       Assessment:  1.  Post pancreatectomy diabetes mellitus, s/p total  pancreatectomy and islet autotransplant.    Lynn is a 57 year old with history of chronic pancreatitis who is s/p total pancreatectomy and islet autotransplant.  She was insulin independent until 6/6/2017 (just after 4 years post-tx) and now has partial islet function.    We changed to Fiasp after last visit due to rapid gastric emptying and post meal hypoglycemia.  Although I can't review her Dexcom for technical reasons today she seems to be stable overall and we did not make any dose changes at this time.  Refill sent for the glargine (Lantus, Basaglar, Semglee OK).    Plan:  1.  Changes to current diabetes regimen:  Patient Instructions   1)  No changes to the diabetes regimen.    Follow up June 2 at 3pm      2.  Frequency of blood sugar checks:  premeal and bedtime, and as needed for hypoglycemia (6 strip per day).    3.  Continue routine follow up for autoislet transplant patients:  Mixed meal test (6 mL/kg BoostHP to max of 360 mL) at 3 months, 6 months, and once a year post transplant.  Hemoglobin A1c levels at these time points and quarterly.    4.  Other issues addressed today:  As above    Follow up:  As above        Contact me for questions at [phone] or [phone].  Emergency number to reach pediatric endocrinology after hours is [phone].      Dr. Adriana Robles MD  [facility] [place]  Director of Research, Islet Auto-transplant Program  Phone:  [phone]  Electronically signed: February 17, 2022 at 3:33 PM        Review of the result(s) of each unique test - A1c from recent hospital admit  Prescription drug management  30 minutes spent on the date of the encounter doing chart review, history and exam, documentation and further activities per the note

## 2022-02-17 NOTE — LETTER
2/17/2022     RE: Lynn Thompson  [id number] [facility] [id number]    Dear Colleague,    Thank you for referring your patient, Lynn Thompson, to the [facility]. Please see a copy of my visit note below.    [facility]  Islet Autotransplant, Diabetes Follow Up    Problem List:  Patient Active Problem List   Diagnosis     Iron deficiency anemia secondary to inadequate dietary iron intake     Gastric bypass status for obesity     Health Care Home     Chronic pain syndrome     Exocrine pancreatic insufficiency     Asplenia     BLU (obstructive sleep apnea)     S/P exploratory laparotomy     Dysthymia     Anxiety     Thyroid nodule     Acquired hypothyroidism     Post-pancreatectomy diabetes (H)     Other iron deficiency anemia     Uterovaginal prolapse, unspecified     Rectal prolapse     Small bowel obstruction (H)     Acute kidney injury (H)       HPI:  Lynn is a 57 year old female here for follow up o total pancreatectomy, islet cell autotransplant, splenectomy, liver biopsy (needle core), choledochoduodenostomy, feeding jejunostomy performed on 5/10/2013.  At the time of the procedure, the patient received 178,100 IEQ, or 3,209 IEQ/kg body weight. She has had two subsequent exploratory laparatomy for small bowel obstruction. Other notable endocrine history includes a complex cystic nodule in mid right thyroid lobe with 1 cm maximum diameter (saw Dr Adorno in 11/2016) which needs annual follow up U/S in Nov 2017, and mild hypothyroidism followed by PCP.  She had an episode of cholangitis and was hospitalized for 4 days 8/24/17 (fevers, RUQ pain, + Ecoli blood cx).    She was on NO insulin at her 1 year, 2 year, 3 year, 4 year transplant anniversaries and restart insulin just after 4 years post-tx, insulin restart date of  6/6/2017.   She had a slow opioid wean off suboxone but eventually did come off.     At today's visit, overall  Lynn's interim history is notable for surgical procedure since I last saw her.  Admitted from -  for  1. Posterior suture rectopexy (Colorectal primary)  2. Sigmoidoscopy (Colorectal primary)  3. Supracervical hysterectomy with bilateral salpingooophrectomy (Uro)  4. Uterosacral ligament suspension (Uro)  5. Gan colposuspension (Uro)  6. Cystoscopy (Uro)        1)  Diabetes:  Lynn continues to have partial islet graft function.   She is on Fiasp right before meal time and has rapid gastric emptying. She uses the pen for half unit doses.   She continues on a Dexcom but I am unable to access data in the clarity system today.  I think her Clarity was logged out on her phone and her phone is not connected to wifi here when she logged back in. She also has her transmitter stopped now b/c her sensor  this morning.  She has however been using her Dexcom and she tells me her numbers are remaining fairly consistent after surgery.  Still gets some intermittent lows.     Lantus 5 units per day  Novolog (Fiasp) 0.5 units per 10g, and correction scale of 0.5 u per 100 >150 (3 injections per day with meals)--     Mixed meal shows islet graft fx.      Recent A1c:  Lab Results   Component Value Date    A1C 6.8 2022    A1C 6.5 10/07/2021    A1C 7.0 2021    A1C 6.8 05/10/2021    A1C 6.9 2021    A1C 6.7 2020    A1C 7.1 2020    A1C 6.9 2020         Hypoglycemia history:   Recent history as above.  The patient has had NO episodes of severe hypoglycemia (seizure, coma, or neuroglycopenic symptoms severe enough to require assistance from another person).  Blood sugars were reviewed from the patient records and/or the meter download.      Uses Dexcom G6- unable to download today for technical reasons.   Did review inpatient BGs that were  mg/dL.  Mostly these were 100s but the variability came towards end of hospital admit on clear liquid diet.        Patient is good  candidate for CGM therapy.  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   --  Require frequent adjustments of the insulin treatment regimen, based on therapeutic CGM test results      2)  New issues:  Weight loss has been a problem but she has actually stabilize since summer.  Discussed dietitian consult. .     Review of systems:  A complete Review Of Systems was performed but was negative except as noted in the HPI.    Past Medical and Surgical History:  Past Medical History:   Diagnosis Date     Benign paroxysmal positional vertigo     occ.      Calculus of kidney 05/2005    x1 on L side passed, several stones.  Has been tested for oxalate.     Chronic abdominal pain 07/17/2013     Chronic pain      Chronic pancreatitis 07/17/2013     Depression     also occ panic spells     Depressive disorder      Diabetes (H) 5/10/2013    Total Pancreatomy with Auto Islets Transplant     Dyspepsia 06/1999    H. pylori   treated     Headaches     still periodic HA's ;  often 5X/week     Hypertension 02/22/2016    Stress related     Iron deficiency anemia secondary to inadequate dietary iron intake 11/2003    relates to gastric bypass     Post-pancreatectomy diabetes melltius 05/17/2013     Sleep apnea     uses splint     Spasm of sphincter of Oddi     [place] [facility] [place]     Thyroid nodule 11/01/2016     Vaccination not carried out      Past Surgical History:   Procedure Laterality Date     ABDOMINOPLASTY  06/24/2002    Tummy tuck     APPENDECTOMY  1990     BUNIONECTOMY Right 12/1998     CBD  Stent placement  2002    CBD stent; Dr. Presley      SECTION       CHOLECYSTECTOMY       COLONOSCOPY   &     colonoscopy     COLONOSCOPY       COLONOSCOPY N/A 3/31/2021    Procedure: COLONOSCOPY INCOMPLETE Aborted due to incomplete prep  will need to take additional prep and return tomorrow 21;  Surgeon: Ihsan Saenz MD;  Location: [facility]     COMBINED CYSTOSCOPY, RETROGRADES, URETEROSCOPY, LASER HOLMIUM LITHOTRIPSY URETER(S), INSERT STENT Right 2015    Procedure: COMBINED CYSTOSCOPY, RETROGRADES, URETEROSCOPY, LASER HOLMIUM LITHOTRIPSY URETER(S), INSERT STENT;  Surgeon: Kennedi Aldana MD;  Location: [facility]     COMBINED CYSTOSCOPY, RETROGRADES, URETEROSCOPY, LASER HOLMIUM LITHOTRIPSY URETER(S), INSERT STENT Right 2015    Procedure: COMBINED CYSTOSCOPY, RETROGRADES, URETEROSCOPY, LASER HOLMIUM LITHOTRIPSY URETER(S), INSERT STENT;  Surgeon: Kennedi Aldana MD;  Location: [facility]     COSMETIC SURGERY  2002    Tummy tuck     CYSTECTOMY OVARIAN BENIGN Right      CYSTOSCOPY, RETROGRADES, INSERT STENT URETER(S), COMBINED  10/02/2012    Procedure: COMBINED CYSTOSCOPY, RETROGRADES, INSERT STENT URETER(S);  COMBINED CYSTOSCOPY,  , INSERT LEFT STENT URETER;  Surgeon: Johny Baez MD;  Location: [facility]     ESOPHAGOSCOPY, GASTROSCOPY, DUODENOSCOPY (EGD), COMBINED N/A 2018    Procedure: COMBINED ESOPHAGOSCOPY, GASTROSCOPY, DUODENOSCOPY (EGD);  ESOPHAGOSCOPY, GASTROSCOPY, DUODENOSCOPY (EGD)    ;  Surgeon: Tamir Rodgers MD;  Location: [facility]     EXTRACORPOREAL SHOCK WAVE LITHOTRIPSY (ESWL)  10/16/2012    Procedure: EXTRACORPOREAL SHOCK WAVE LITHOTRIPSY (ESWL);  left EXTRACORPOREAL SHOCK WAVE LITHOTRIPSY (ESWL) ;  Surgeon: Johny Baez MD;  Location: [facility]     Gastric bypass NOS       HERNIA REPAIR  2015     HYSTERECTOMY SUPRACERVICAL, BILATERAL SALPINGO-OOPHORECTOMY, COMBINED N/A 2022    Procedure: Abdominal supracervical hysterectomy, bilateral  salpingooophrectomy;  Surgeon: Nicole Rivera MD;  [facility]     IRRIGATION AND DEBRIDEMENT HAND, COMBINED Left 10/30/2020    Procedure: Left hand sharp excisional debridement of skin, subcutaneous tissue and fat with a scalpel, 2 x 1 x 1 cm.;  Surgeon: Demian Renteria MD;  Location: [facility]     LAP, LYSIS OF ADHESIONS       LAPAROSCOPIC LYSIS ADHESIONS N/A 2015    Procedure: LAPAROSCOPIC LYSIS ADHESIONS;  Surgeon: Aaron Early MD;  [facility]     LAPAROSCOPIC LYSIS ADHESIONS N/A 2015    Procedure: LAPAROSCOPIC LYSIS ADHESIONS;  Surgeon: Aaron Early MD;  [facility]     PANCREATECTOMY, TRANSPLANT AUTO ISLET CELL, COMBINED  05/10/2013    Procedure: COMBINED PANCREATECTOMY, TRANSPLANT AUTO ISLET CELL;  Pancreatectomy, Auto Islet Cell Transplant   hernia repair, jejunostomy tube and liver biopsies with Anesthesia General with block;  Surgeon: Aaron Early MD;  [facility]     Partial ileum resection       RECTOPEXY ABDOMINAL N/A 2022    Procedure: RECTOPEXY, ABDOMINAL;  Surgeon: Uriah Sheridan MD;  [facility]     REPAIR PTOSIS BROW BILATERAL Bilateral 2020    Procedure: BILATERAL BROW PTOSIS REPAIR;  Surgeon: Denise Alberts MD;  Location: [facility]     SACROCOLPOPEXY, CYSTOSCOPY, COMBINED N/A 2022    Procedure: Uterosacral ligament suspension, pina colposuspension with Cystoscopy;  Surgeon: Nicole Rivera MD;  [facility]     SIGMOIDOSCOPY FLEXIBLE N/A 2022    Procedure: SIGMOIDOSCOPY, FLEXIBLE;  Surgeon: Uriah Sheridan MD;  [facility]     Surgery for SBO       TONSILLECTOMY, ADENOIDECTOMY, COMBINED  1997     TRANSPLANT  5/10/13    Pancreatic Auto-Islet Transplant       Family History:  New changes since last visit:  none  Family History   Problem Relation Age of Onset     Family History Negative Mother      Respiratory Father         COPD;  at 69     Genitourinary Problems Father          kidney stones     Substance Abuse Father      Depression Father      Asthma Father      Heart Disease Paternal Grandfather         M.I.     Coronary Artery Disease Paternal Grandfather      Hyperlipidemia Paternal Grandfather      Genitourinary Problems Brother         multiple brothers with kidney stones     Gastrointestinal Disease Maternal Grandmother         undiagnosed 'gut' issues     Coronary Artery Disease Maternal Grandfather      Hyperlipidemia Maternal Grandfather      Cerebrovascular Disease Paternal Grandmother         At the age of 103     Anxiety Disorder Paternal Grandmother      Osteoporosis Paternal Grandmother      Anxiety Disorder Son      Anxiety Disorder Daughter      Asthma Daughter      Colon Cancer No family hx of        Social History:  Social History     Social History Narrative     Not on file     Does not work currently.  Mom to many foster dogs     Physical Exam:  Vitals: /62   Pulse 63   Wt 53.6 kg (118 lb 3.2 oz)   LMP 12/19/2013   SpO2 99%   Breastfeeding No   BMI 20.29 kg/m    BMI= Body mass index is 20.29 kg/m .  General:  Appearance is normal, no acute distress  HEENT:  NC/AT, sclera appear white  Neck:  No obvious thyromegaly  CV/Lungs:  Non distressed breathing  Skin:  No apparent rashes.   Neuro:  Normal mental status  Psych:  Normal affect    Results  Mixed Meal Test:  C Peptide   Date Value Ref Range Status   10/07/2021 1.4 0.9 - 6.9 ng/mL Final   10/07/2021 1.2 0.9 - 6.9 ng/mL Final   09/03/2021 0.3 (L) 0.9 - 6.9 ng/mL Final   08/20/2020 3.4 0.9 - 6.9 ng/mL Final   08/20/2020 1.5 0.9 - 6.9 ng/mL Final   08/20/2020 0.5 (L) 0.9 - 6.9 ng/mL Final   05/16/2019 1.8 0.9 - 6.9 ng/mL Final   05/16/2019 1.5 0.9 - 6.9 ng/mL Final     Glucose   Date Value Ref Range Status   02/01/2022 90 70 - 99 mg/dL Final   01/18/2022 123 (H) 70 - 99 mg/dL Final   11/26/2021 139 (H) 70 - 99 mg/dL Final   11/25/2021 134 (H) 70 - 99 mg/dL Final   11/24/2021 253 (H) 70 - 99 mg/dL Final    05/10/2021 169 (H) 70 - 99 mg/dL Final   03/01/2021 141 (H) 70 - 99 mg/dL Final   02/28/2021 120 (H) 70 - 99 mg/dL Final   02/16/2021 106 (H) 70 - 99 mg/dL Final   10/30/2020 126 (H) 70 - 99 mg/dL Final     GLUCOSE BY METER POCT   Date Value Ref Range Status   02/05/2022 104 (H) 70 - 99 mg/dL Final   02/05/2022 68 (L) 70 - 99 mg/dL Final   02/05/2022 339 (H) 70 - 99 mg/dL Final   02/04/2022 201 (H) 70 - 99 mg/dL Final   02/04/2022 195 (H) 70 - 99 mg/dL Final       Hemoglobin A1c  Lab Results   Component Value Date    A1C 6.8 01/18/2022    A1C 6.5 10/07/2021    A1C 7.0 09/03/2021    A1C 6.8 05/10/2021    A1C 6.9 02/16/2021    A1C 6.7 08/20/2020    A1C 7.1 06/05/2020    A1C 6.9 02/06/2020       Liver enzymes  Lab Results   Component Value Date    AST 23 11/26/2021    AST 24 05/10/2021     Lab Results   Component Value Date    ALT 39 11/26/2021    ALT 35 05/10/2021     Lab Results   Component Value Date    BILICONJ 0.0 05/12/2014      Lab Results   Component Value Date    BILITOTAL 0.3 11/26/2021    BILITOTAL 0.3 05/10/2021     Lab Results   Component Value Date    ALBUMIN 2.5 11/26/2021    ALBUMIN 3.4 05/10/2021     Lab Results   Component Value Date    PROTTOTAL 5.7 11/26/2021    PROTTOTAL 6.9 05/10/2021      Lab Results   Component Value Date    ALKPHOS 138 11/26/2021    ALKPHOS 132 05/10/2021       Creatinine:  Creatinine   Date Value Ref Range Status   02/01/2022 0.74 0.52 - 1.04 mg/dL Final   05/10/2021 0.81 0.52 - 1.04 mg/dL Final   ]    Complete Blood Count:  CBC RESULTS:   Recent Labs   Lab Test 02/04/22  0748 02/03/22  0730   WBC  --  7.8   RBC  --  2.84*   HGB 8.5* 8.5*  8.5*   HCT  --  28.1*   MCV  --  99   MCH  --  29.9   MCHC  --  30.2*   RDW  --  14.6   PLT  --  251       Cholesterol  Lab Results   Component Value Date    CHOL 188 10/07/2021    CHOL 192 09/17/2020     Lab Results   Component Value Date    HDL 90 10/07/2021    HDL 91 09/17/2020     Lab Results   Component Value Date    LDL 81  10/07/2021    LDL 84 09/17/2020     Lab Results   Component Value Date    TRIG 83 10/07/2021    TRIG 83 09/17/2020     Lab Results   Component Value Date    KINHDLRWALKERO 1.9 11/20/2014       Assessment:  1.  Post pancreatectomy diabetes mellitus, s/p total pancreatectomy and islet autotransplant.    Lynn is a 57 year old with history of chronic pancreatitis who is s/p total pancreatectomy and islet autotransplant.  She was insulin independent until 6/6/2017 (just after 4 years post-tx) and now has partial islet function.    We changed to Fiasp after last visit due to rapid gastric emptying and post meal hypoglycemia.  Although I can't review her Dexcom for technical reasons today she seems to be stable overall and we did not make any dose changes at this time.  Refill sent for the glargine (Lantus, Basaglar, Semglee OK).    Plan:  1.  Changes to current diabetes regimen:  Patient Instructions   1)  No changes to the diabetes regimen.    Follow up June 2 at 3pm      2.  Frequency of blood sugar checks:  premeal and bedtime, and as needed for hypoglycemia (6 strip per day).    3.  Continue routine follow up for autoislet transplant patients:  Mixed meal test (6 mL/kg BoostHP to max of 360 mL) at 3 months, 6 months, and once a year post transplant.  Hemoglobin A1c levels at these time points and quarterly.    4.  Other issues addressed today:  As above    Follow up:  As above        Contact me for questions at [phone] or [phone].  Emergency number to reach pediatric endocrinology after hours is [phone].      Dr. Adriana Robles MD  [facility] acute hospital [facility] [place]  Director of Research, Islet Auto-transplant Program  Phone:  [phone]  Electronically signed: February 17, 2022 at 3:33 PM        Review of the result(s) of each unique test - A1c from recent hospital admit  Prescription drug management  30 minutes spent on the date of the encounter doing chart review,  history and exam, documentation and further activities per the note    Again, thank you for allowing me to participate in the care of your patient.      Sincerely,    Adriana Robles MD

## 2022-02-18 ENCOUNTER — MYC MEDICAL ADVICE (OUTPATIENT)
Dept: SURGERY | Facility: CLINIC | Age: 57
End: 2022-02-18
Payer: MEDICARE

## 2022-02-18 NOTE — CONFIDENTIAL NOTE
Sent upcoming appointment reminder via [employer].     Appt date/time: 2/23/2022 at 11:00 AM  Provider: GABBY Patel  Appt type: post-op     Sully Phelan CMA

## 2022-02-23 ENCOUNTER — OFFICE VISIT (OUTPATIENT)
Dept: SURGERY | Facility: CLINIC | Age: 57
End: 2022-02-23
Payer: MEDICARE

## 2022-02-23 DIAGNOSIS — K62.3 RECTAL PROLAPSE: ICD-10-CM

## 2022-02-23 DIAGNOSIS — Z09 FOLLOW-UP EXAMINATION AFTER COLORECTAL SURGERY: Primary | ICD-10-CM

## 2022-02-23 PROCEDURE — 99024 POSTOP FOLLOW-UP VISIT: CPT | Performed by: NURSE PRACTITIONER

## 2022-02-23 RX ORDER — METHOCARBAMOL 500 MG/1
500 TABLET, FILM COATED ORAL 3 TIMES DAILY PRN
Qty: 15 TABLET | Refills: 0 | Status: SHIPPED | OUTPATIENT
Start: 2022-02-23 | End: 2022-05-08

## 2022-02-23 ASSESSMENT — PAIN SCALES - GENERAL: PAINLEVEL: MILD PAIN (2)

## 2022-02-23 NOTE — PROGRESS NOTES
Colon and Rectal Surgery Postoperative Clinic Note    RE: Lynn Thompson  : 1965  FAUSTO: 2022    Lynn Thompson is a very pleasant 57 year old female with history of total pancreatectomy with islet L autotransplant in , splenectomy, choledochoduodenostomy, gastric bypass, appendectomy, ileal resection in  for Crohn's disease, hernia repair, lysis of adhesions x2, and a small bowel obstruction in .  Crohn's has been in remission for over 20 years and she has not needed to be on any medications for this.  She has a history of constipation and developed rectal prolapse.  She is now status post posterior sutured rectopexy, lysis of adhesions, and flexible sigmoidoscopy with Dr. Sheridan and supracervical hysterectomy with bilateral salpingo-oophorectomy and Gan procedure with Dr. Rivera on 2022.    Final Diagnosis    Uterus, bilateral fallopian tubes, and ovaries, supracervical hysterectomy with bilateral salpingo-oophorectomy:  -Inactive endometrium  -Adenomyosis  -Bilateral fallopian tubes, no significant histologic abnormalities  -Bilateral ovaries with atrophic changes  -Negative for malignancy     Interval history: Having loose stools about 5 times in the evening. Taking Miralax once a day. Tylenol and Advil for pain. Robaxin is very helpful for the pain. Not needing any narcotics. Was having some incontinence. Is now having urgency with some incontinence but this seems to be improving.     Physical Examination:   LMP 2013   Alert, oriented, in no acute distress, sitting comfortably.  Midline incision well approximated without any erythema or drainage.  Surgical glue is gone.  Wearing abdominal binder.    Assessment/Plan:  57 year old female status post posterior sutured rectopexy, lysis of adhesions, and flexible sigmoidoscopy with Dr. Sheridan and supracervical hysterectomy with bilateral salpingo-oophorectomy and Gan procedure with Dr. Rivera on 2022  She is recovering  quite well from surgery.  She is bothered by the frequency of her stools along with some incontinence in the evening.  We will have her try taking a half a dose of MiraLAX in the morning and a half in the evening instead of 1 dose in the morning.  If she continues to have frequent loose stools, can cut down to half a dose a day.  Continue to avoid any prolonged sitting on the toilet or straining.  No lifting more than 10 pounds for full 6 weeks from surgery.  She is meeting with Dr. Rivera tomorrow and with Dr. Sheridan in 1 month.  Encouraged her to contact the clinic in the meantime with any questions or concerns. Patient's questions were answered to her stated satisfaction and she is in agreement with this plan.    Medical history:  Past Medical History:   Diagnosis Date     Benign paroxysmal positional vertigo     occ.      Calculus of kidney 05/2005    x1 on L side passed, several stones.  Has been tested for oxalate.     Chronic abdominal pain 2013     Chronic pain      Chronic pancreatitis 2013     Depression     also occ panic spells     Depressive disorder      Diabetes (H) 5/10/2013    Total Pancreatomy with Auto Islets Transplant     Dyspepsia 1999    H. pylori   treated     Headaches     still periodic HA's ;  often 5X/week     Hypertension 2016    Stress related     Iron deficiency anemia secondary to inadequate dietary iron intake 2003    relates to gastric bypass     Post-pancreatectomy diabetes melltius 2013     Sleep apnea     uses splint     Spasm of sphincter of Oddi     [place] [facility] [place]     Thyroid nodule 2016     Vaccination not carried out        Surgical history:  Past Surgical History:   Procedure Laterality Date     ABDOMINOPLASTY  2002    Tummy tuck     APPENDECTOMY  1990     BUNIONECTOMY Right 1998     CBD Stent placement  2002    CBD stent; Dr. Presley      SECTION        CHOLECYSTECTOMY       COLONOSCOPY  6/02 & 12/05    colonoscopy     COLONOSCOPY       COLONOSCOPY N/A 3/31/2021    Procedure: COLONOSCOPY INCOMPLETE Aborted due to incomplete prep  will need to take additional prep and return tomorrow 4/1/21;  Surgeon: Ihsan Saenz MD;  Location: [facility]     COMBINED CYSTOSCOPY, RETROGRADES, URETEROSCOPY, LASER HOLMIUM LITHOTRIPSY URETER(S), INSERT STENT Right 03/23/2015    Procedure: COMBINED CYSTOSCOPY, RETROGRADES, URETEROSCOPY, LASER HOLMIUM LITHOTRIPSY URETER(S), INSERT STENT;  Surgeon: Kennedi Aldana MD;  Location: [facility]     COMBINED CYSTOSCOPY, RETROGRADES, URETEROSCOPY, LASER HOLMIUM LITHOTRIPSY URETER(S), INSERT STENT Right 04/20/2015    Procedure: COMBINED CYSTOSCOPY, RETROGRADES, URETEROSCOPY, LASER HOLMIUM LITHOTRIPSY URETER(S), INSERT STENT;  Surgeon: Kennedi Aldana MD;  [facility]: [facility]     COSMETIC SURGERY  6/24/2002    Tummy tuck     CYSTECTOMY OVARIAN BENIGN Right      CYSTOSCOPY, RETROGRADES, INSERT STENT URETER(S), COMBINED  10/02/2012    Procedure: COMBINED CYSTOSCOPY, RETROGRADES, INSERT STENT URETER(S);  COMBINED CYSTOSCOPY,  , INSERT LEFT STENT URETER;  Surgeon: Johny Baez MD;  Location: [facility]     ESOPHAGOSCOPY, GASTROSCOPY, DUODENOSCOPY (EGD), COMBINED N/A 01/24/2018    Procedure: COMBINED ESOPHAGOSCOPY, GASTROSCOPY, DUODENOSCOPY (EGD);  ESOPHAGOSCOPY, GASTROSCOPY, DUODENOSCOPY (EGD)    ;  Surgeon: Tamir Rodgers MD;  Location: [facility]     EXTRACORPOREAL SHOCK WAVE LITHOTRIPSY (ESWL)  10/16/2012    Procedure: EXTRACORPOREAL SHOCK WAVE LITHOTRIPSY (ESWL);  left EXTRACORPOREAL SHOCK WAVE LITHOTRIPSY (ESWL) ;  Surgeon: Johny Baez MD;  Location: [facility]     Gastric bypass NOS       HERNIA REPAIR  02/2015     HYSTERECTOMY SUPRACERVICAL, BILATERAL SALPINGO-OOPHORECTOMY, COMBINED N/A 2/1/2022    Procedure: Abdominal supracervical hysterectomy, bilateral salpingooophrectomy;  Surgeon: Nicole Rivera MD;  [facility]      IRRIGATION AND DEBRIDEMENT HAND, COMBINED Left 10/30/2020    Procedure: Left hand sharp excisional debridement of skin, subcutaneous tissue and fat with a scalpel, 2 x 1 x 1 cm.;  Surgeon: Demian Renteria MD;  Location: [facility]     LAP, LYSIS OF ADHESIONS       LAPAROSCOPIC LYSIS ADHESIONS N/A 02/20/2015    Procedure: LAPAROSCOPIC LYSIS ADHESIONS;  Surgeon: Aaron Early MD;  [facility]     LAPAROSCOPIC LYSIS ADHESIONS N/A 12/29/2015    Procedure: LAPAROSCOPIC LYSIS ADHESIONS;  Surgeon: Aaron Early MD;  [facility]     PANCREATECTOMY, TRANSPLANT AUTO ISLET CELL, COMBINED  05/10/2013    Procedure: COMBINED PANCREATECTOMY, TRANSPLANT AUTO ISLET CELL;  Pancreatectomy, Auto Islet Cell Transplant   hernia repair, jejunostomy tube and liver biopsies with Anesthesia General with block;  Surgeon: Aaron Early MD;  [facility]     Partial ileum resection  1992     RECTOPEXY ABDOMINAL N/A 2/1/2022    Procedure: RECTOPEXY, ABDOMINAL;  Surgeon: Uriah Sheridan MD;  [facility]     REPAIR PTOSIS BROW BILATERAL Bilateral 06/09/2020    Procedure: BILATERAL BROW PTOSIS REPAIR;  Surgeon: Denise Alberts MD;  Location: [facility]     SACROCOLPOPEXY, CYSTOSCOPY, COMBINED N/A 2/1/2022    Procedure: Uterosacral ligament suspension, pina colposuspension with Cystoscopy;  Surgeon: Nicole Rivera MD;  [facility]     SIGMOIDOSCOPY FLEXIBLE N/A 2/1/2022    Procedure: SIGMOIDOSCOPY, FLEXIBLE;  Surgeon: Uriah Sheridan MD;  [facility]     Surgery for SBO  2015     TONSILLECTOMY, ADENOIDECTOMY, COMBINED  1997     TRANSPLANT  5/10/13    Pancreatic Auto-Islet Transplant       Problem list:    Patient Active Problem List    Diagnosis Date Noted     Health Care Home 05/03/2011     Priority: High     EMERGENCY CARE PLAN  Presenting Problem Signs and Symptoms Treatment Plan    Questions or conerns during clinic hours    I will call the clinic directly     Questions or conerns  outside clinic hours    I will call the 24 hour nurse line at [phone]    Patient needs to schedule an appointment    I will call the 24 hour scheduling team at [phone] or clinic directly    Same day treatment     I will call the clinic first, nurse line if after hours, urgent care and express care if needed                            DX V65.8 REPLACED WITH [id number] HEALTH CARE HOME (04/08/2013)       Small bowel obstruction (H) 11/23/2021     Priority: Medium     Acute kidney injury (H) 11/23/2021     Priority: Medium     Uterovaginal prolapse, unspecified 11/03/2021     Priority: Medium     Rectal prolapse 11/03/2021     Priority: Medium     Other iron deficiency anemia 03/15/2021     Priority: Medium     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     Priority: Medium     Acquired hypothyroidism 11/03/2016     Priority: Medium     Thyroid nodule 11/01/2016     Priority: Medium     Dysthymia 03/01/2016     Priority: Medium     Anxiety 03/01/2016     Priority: Medium     S/P exploratory laparotomy 12/29/2015     Priority: Medium     BLU (obstructive sleep apnea) 12/23/2015     Priority: Medium     Asplenia 10/24/2014     Priority: Medium     Exocrine pancreatic insufficiency 05/17/2013     Priority: Medium     Chronic pain syndrome 02/23/2013     Priority: Medium     Gastric bypass status for obesity 10/26/2010     Priority: Medium     Iron deficiency anemia secondary to inadequate dietary iron intake 12/28/2004     Priority: Medium     relates to gastric bypass         Medications:  Current Outpatient Medications   Medication Sig Dispense Refill     acetaminophen (TYLENOL) 500 MG tablet Take 1 tablet (500 mg) by mouth every 6 hours       amylase-lipase-protease (VIOKACE) 31682-16987 units TABS tablet Take 4-5 with meals and 2 with snacks (Patient taking differently: Take 4-5 with big meal  2-3 with small meals and snacks) 500 tablet 11     calcium carbonate 600 mg-vitamin D 400 units (CALTRATE) 600-400  MG-UNIT per tablet Take 1 tablet by mouth daily       Continuous Blood Gluc  (DEXCOM G6 ) MODESTA Use as directed to check blood glucose levels 1 each 1     Continuous Blood Gluc Sensor (DEXCOM G6 SENSOR) MISC Change every 10 days 3 each 11     Continuous Blood Gluc Transmit (DEXCOM G6 TRANSMITTER) MISC Change every 3 months 1 each 3     DULoxetine (CYMBALTA) 20 MG capsule Take 20 mg by mouth daily       escitalopram (LEXAPRO) 20 MG tablet Take 20 mg by mouth daily       estradiol (ESTRACE) 0.1 MG/GM vaginal cream PLACE 2 GRAMS VAGINALLY 2 TIMES WEEKLY (Patient taking differently: PLACE 2 GRAMS VAGINALLY 2 TIMES WEEKLY  Thursdays and Sundays) 42.5 g 1     famotidine (PEPCID) 40 MG tablet Take 1 tablet (40 mg) by mouth 2 times daily as needed for heartburn 180 tablet 3     FIASP PENFILL 100 UNIT/ML SOCT Inject 0.5-4 Units Subcutaneous 4 times daily (with meals and nightly) (Patient taking differently: Inject 0.5-4 Units Subcutaneous 3 times daily ) 15 mL 5     gabapentin (NEURONTIN) 300 MG capsule Take 300 mg by mouth nightly as needed       Glucagon (GVOKE HYPOPEN 1-PACK) 1 MG/0.2ML SOAJ Inject 1 mg Subcutaneous once as needed (for hypoglycemia with loss of consciousness) 15 mL 5     glucose 40 % GEL Take 15-30 g by mouth every 15 minutes as needed. 1 Tube 11     hydrOXYzine (ATARAX) 25 MG tablet TAKE 1-2 TABLETS BY MOUTH 2 TIMES DAILY AS NEEDED FOR ANXIETY  0     insulin glargine (LANTUS PEN) 100 UNIT/ML pen Inject 5 Units Subcutaneous At Bedtime Inject 6 units daily 15 mL 11     insulin pen needle (32G X 4 MM) 32G X 4 MM miscellaneous Use 6 pen needles daily 100 each 11     levothyroxine (SYNTHROID/LEVOTHROID) 100 MCG tablet Take 1 tablet (100 mcg) by mouth daily 90 tablet 3     loratadine (CLARITIN) 10 MG tablet Take 10 mg by mouth daily as needed        methocarbamol (ROBAXIN) 500 MG tablet Take 1 tablet (500 mg) by mouth 3 times daily as needed for muscle spasms 15 tablet 0     modafinil (PROVIGIL)  200 MG tablet Take 400 mg by mouth daily       omeprazole (PRILOSEC) 40 MG DR capsule Take 1 capsule (40 mg) by mouth 2 times daily Take 30-60 minutes before a meal. 180 capsule 3     ondansetron (ZOFRAN) 4 MG tablet Take 1 tablet (4 mg) by mouth every 6 hours At 8:00 am, 2:00 pm, 8:00 pm the day prior to your surgery with neomycin and flagyl. 3 tablet 0     polyethylene glycol (MIRALAX) 17 GM/Dose powder Take 17 g by mouth 2 times daily 510 g 1     STATIN NOT PRESCRIBED (INTENTIONAL) Please choose reason not prescribed from choices below.       traZODone (DESYREL) 50 MG tablet Take 50 mg by mouth nightly as needed for sleep       fluconazole (DIFLUCAN) 100 MG tablet Take 1 tablet (100 mg) by mouth daily (Patient not taking: Reported on 2/17/2022) 3 tablet 0     oxyCODONE (ROXICODONE) 5 MG tablet Take 1 tablet (5 mg) by mouth every 6 hours as needed for severe pain (Patient not taking: Reported on 2/23/2022) 15 tablet 0     oxyCODONE (ROXICODONE) 5 MG tablet Take 1-2 tablets (5-10 mg) by mouth every 3 hours as needed for moderate to severe pain (Patient not taking: Reported on 2/23/2022) 15 tablet 0       Allergies:  Allergies   Allergen Reactions     Corticosteroids Other (See Comments)     All oral, IV and injectable steroids are contraindicated (unless in life threatening situations) in Islet Auto transplant recipients. They can cause irreversible loss of islet cell function. Please contact patient's transplant care coordinator, Erlinda Multani RN BSN at [phone]/pager: [phone] and endocrinologist prior to administration.       Povidone Iodine Hives     Causes skin to blister     Naproxen      Other reaction(s): Abdominal Pain  Pt allergic to Naprosyn     Nsaids      naprosyn = GI upset     Povidone Iodine      blisters     Sulfasalazine Nausea and Nausea and Vomiting     Iodine Rash       Family history:  Family History   Problem Relation Age of Onset     Family History Negative Mother       Respiratory Father         COPD;  at 69     Genitourinary Problems Father         kidney stones     Substance Abuse Father      Depression Father      Asthma Father      Heart Disease Paternal Grandfather         M.I.     Coronary Artery Disease Paternal Grandfather      Hyperlipidemia Paternal Grandfather      Genitourinary Problems Brother         multiple brothers with kidney stones     Gastrointestinal Disease Maternal Grandmother         undiagnosed 'gut' issues     Coronary Artery Disease Maternal Grandfather      Hyperlipidemia Maternal Grandfather      Cerebrovascular Disease Paternal Grandmother         At the age of 103     Anxiety Disorder Paternal Grandmother      Osteoporosis Paternal Grandmother      Anxiety Disorder Son      Anxiety Disorder Daughter      Asthma Daughter      Colon Cancer No family hx of        Social history:  Social History     Tobacco Use     Smoking status: Never Smoker     Smokeless tobacco: Never Used   Substance Use Topics     Alcohol use: Not Currently     Alcohol/week: 0.0 standard drinks     Marital status: .    There are no exam notes on file for this visit.     30 minutes spent on the date of the encounter doing chart review, history and exam, documentation and further activities as noted above.   This is a postop visit.    MITZY Hancock, NP-C  Colon and Rectal Surgery  [facility]    This note was created using speech recognition software and may contain unintended word substitutions.

## 2022-02-23 NOTE — LETTER
2022       RE: Lynn Thompson  [address] [facility] [id number]     Dear Colleague,    Thank you for referring your patient, Lynn Thompson, to the [facility] COLON AND RECTAL SURGERY CLINIC [place] at [facility]. Please see a copy of my visit note below.    Colon and Rectal Surgery Postoperative Clinic Note    RE: Lynn Thompson  : 1965  FAUSTO: 2022    Lynn Thompson is a very pleasant 57 year old female with history of total pancreatectomy with islet L autotransplant in , splenectomy, choledochoduodenostomy, gastric bypass, appendectomy, ileal resection in  for Crohn's disease, hernia repair, lysis of adhesions x2, and a small bowel obstruction in .  Crohn's has been in remission for over 20 years and she has not needed to be on any medications for this.  She has a history of constipation and developed rectal prolapse.  She is now status post posterior sutured rectopexy, lysis of adhesions, and flexible sigmoidoscopy with Dr. Sheridan and supracervical hysterectomy with bilateral salpingo-oophorectomy and Gan procedure with Dr. Rivera on 2022.    Final Diagnosis    Uterus, bilateral fallopian tubes, and ovaries, supracervical hysterectomy with bilateral salpingo-oophorectomy:  -Inactive endometrium  -Adenomyosis  -Bilateral fallopian tubes, no significant histologic abnormalities  -Bilateral ovaries with atrophic changes  -Negative for malignancy     Interval history: Having loose stools about 5 times in the evening. Taking Miralax once a day. Tylenol and Advil for pain. Robaxin is very helpful for the pain. Not needing any narcotics. Was having some incontinence. Is now having urgency with some incontinence but this seems to be improving.     Physical Examination:   LMP 2013   Alert, oriented, in no acute distress, sitting comfortably.  Midline incision well approximated without any erythema or  drainage.  Surgical glue is gone.  Wearing abdominal binder.    Assessment/Plan:  57 year old female status post posterior sutured rectopexy, lysis of adhesions, and flexible sigmoidoscopy with Dr. Sheridan and supracervical hysterectomy with bilateral salpingo-oophorectomy and Gan procedure with Dr. Rivera on 2/1/2022  She is recovering quite well from surgery.  She is bothered by the frequency of her stools along with some incontinence in the evening.  We will have her try taking a half a dose of MiraLAX in the morning and a half in the evening instead of 1 dose in the morning.  If she continues to have frequent loose stools, can cut down to half a dose a day.  Continue to avoid any prolonged sitting on the toilet or straining.  No lifting more than 10 pounds for full 6 weeks from surgery.  She is meeting with Dr. Rivera tomorrow and with Dr. Sheridan in 1 month.  Encouraged her to contact the clinic in the meantime with any questions or concerns. Patient's questions were answered to her stated satisfaction and she is in agreement with this plan.    Medical history:  Past Medical History:   Diagnosis Date     Benign paroxysmal positional vertigo     occ.      Calculus of kidney 05/2005    x1 on L side passed, several stones.  Has been tested for oxalate.     Chronic abdominal pain 07/17/2013     Chronic pain      Chronic pancreatitis 07/17/2013     Depression     also occ panic spells     Depressive disorder      Diabetes (H) 5/10/2013    Total Pancreatomy with Auto Islets Transplant     Dyspepsia 06/1999    H. 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 Presley      SECTION       CHOLECYSTECTOMY       COLONOSCOPY   &     colonoscopy     COLONOSCOPY       COLONOSCOPY N/A 3/31/2021    Procedure: COLONOSCOPY INCOMPLETE Aborted due to incomplete prep  will need to take additional prep and return tomorrow 21;  Surgeon: Ihsan Saenz MD;  Location: [facility]     COMBINED CYSTOSCOPY, RETROGRADES, URETEROSCOPY, LASER HOLMIUM LITHOTRIPSY URETER(S), INSERT STENT Right 2015    Procedure: COMBINED CYSTOSCOPY, RETROGRADES, URETEROSCOPY, LASER HOLMIUM LITHOTRIPSY URETER(S), INSERT STENT;  Surgeon: Kennedi Aldana MD;  Location: [facility]     COMBINED CYSTOSCOPY, RETROGRADES, URETEROSCOPY, LASER HOLMIUM LITHOTRIPSY URETER(S), INSERT STENT Right 2015    Procedure: COMBINED CYSTOSCOPY, RETROGRADES, URETEROSCOPY, LASER HOLMIUM LITHOTRIPSY URETER(S), INSERT STENT;  Surgeon: Kennedi Aldana MD;  Location: [facility] OR     COSMETIC SURGERY  2002    Tummy tuck     CYSTECTOMY OVARIAN BENIGN Right      CYSTOSCOPY, RETROGRADES, INSERT STENT URETER(S), COMBINED  10/02/2012    Procedure: COMBINED CYSTOSCOPY, RETROGRADES, INSERT STENT URETER(S);  COMBINED CYSTOSCOPY,  , INSERT LEFT STENT URETER;  Surgeon: Johny Baez MD;  Location: [facility] OR     ESOPHAGOSCOPY, GASTROSCOPY, DUODENOSCOPY (EGD), COMBINED N/A 2018    Procedure: COMBINED ESOPHAGOSCOPY, GASTROSCOPY, DUODENOSCOPY (EGD);  ESOPHAGOSCOPY, GASTROSCOPY, DUODENOSCOPY (EGD)    ;  Surgeon: Tamir Rodgers MD;  Location: [facility]     EXTRACORPOREAL SHOCK WAVE LITHOTRIPSY (ESWL)  10/16/2012    Procedure: EXTRACORPOREAL SHOCK WAVE LITHOTRIPSY (ESWL);  left EXTRACORPOREAL SHOCK WAVE LITHOTRIPSY (ESWL) ;   Surgeon: Johny Baez MD;  Location: [facility] OR     Gastric bypass NOS       HERNIA REPAIR  02/2015     HYSTERECTOMY SUPRACERVICAL, BILATERAL SALPINGO-OOPHORECTOMY, COMBINED N/A 2/1/2022    Procedure: Abdominal supracervical hysterectomy, bilateral salpingooophrectomy;  Surgeon: Nicole Rivera MD;  [facility]     IRRIGATION AND DEBRIDEMENT HAND, COMBINED Left 10/30/2020    Procedure: Left hand sharp excisional debridement of skin, subcutaneous tissue and fat with a scalpel, 2 x 1 x 1 cm.;  Surgeon: Demian Renteria MD;  Location: [facility]     LAP, LYSIS OF ADHESIONS       LAPAROSCOPIC LYSIS ADHESIONS N/A 02/20/2015    Procedure: LAPAROSCOPIC LYSIS ADHESIONS;  Surgeon: Aaron Early MD;  [facility]     LAPAROSCOPIC LYSIS ADHESIONS N/A 12/29/2015    Procedure: LAPAROSCOPIC LYSIS ADHESIONS;  Surgeon: Aaron Early MD;  [facility]     PANCREATECTOMY, TRANSPLANT AUTO ISLET CELL, COMBINED  05/10/2013    Procedure: COMBINED PANCREATECTOMY, TRANSPLANT AUTO ISLET CELL;  Pancreatectomy, Auto Islet Cell Transplant   hernia repair, jejunostomy tube and liver biopsies with Anesthesia General with block;  Surgeon: Aaron Early MD;  [facility]     Partial ileum resection  1992     RECTOPEXY ABDOMINAL N/A 2/1/2022    Procedure: RECTOPEXY, ABDOMINAL;  Surgeon: Uriah Sheridan MD;  [facility]     REPAIR PTOSIS BROW BILATERAL Bilateral 06/09/2020    Procedure: BILATERAL BROW PTOSIS REPAIR;  Surgeon: Denise Alberts MD;  Location: [facility]     SACROCOLPOPEXY, CYSTOSCOPY, COMBINED N/A 2/1/2022    Procedure: Uterosacral ligament suspension, pina colposuspension with Cystoscopy;  Surgeon: Nicole Rivera MD;  [facility]     SIGMOIDOSCOPY FLEXIBLE N/A 2/1/2022    Procedure: SIGMOIDOSCOPY, FLEXIBLE;  Surgeon: Uriah Sheridan MD;  [facility]     Surgery for SBO  2015     TONSILLECTOMY, ADENOIDECTOMY, COMBINED  1997     TRANSPLANT  5/10/13    Pancreatic  Auto-Islet Transplant       Problem list:    Patient Active Problem List    Diagnosis Date Noted     Health Care Home 05/03/2011     Priority: High     EMERGENCY CARE PLAN  Presenting Problem Signs and Symptoms Treatment Plan    Questions or conerns during clinic hours    I will call the clinic directly     Questions or conerns outside clinic hours    I will call the 24 hour nurse line at [phone]    Patient needs to schedule an appointment    I will call the 24 hour scheduling team at [phone] or clinic directly    Same day treatment     I will call the clinic first, nurse line if after hours, urgent care and express care if needed                            DX V65.8 REPLACED WITH [id number] [facility] CARE HOME (04/08/2013)       Small bowel obstruction (H) 11/23/2021     Priority: Medium     Acute kidney injury (H) 11/23/2021     Priority: Medium     Uterovaginal prolapse, unspecified 11/03/2021     Priority: Medium     Rectal prolapse 11/03/2021     Priority: Medium     Other iron deficiency anemia 03/15/2021     Priority: Medium     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     Priority: Medium     Acquired hypothyroidism 11/03/2016     Priority: Medium     Thyroid nodule 11/01/2016     Priority: Medium     Dysthymia 03/01/2016     Priority: Medium     Anxiety 03/01/2016     Priority: Medium     S/P exploratory laparotomy 12/29/2015     Priority: Medium     BLU (obstructive sleep apnea) 12/23/2015     Priority: Medium     Asplenia 10/24/2014     Priority: Medium     Exocrine pancreatic insufficiency 05/17/2013     Priority: Medium     Chronic pain syndrome 02/23/2013     Priority: Medium     Gastric bypass status for obesity 10/26/2010     Priority: Medium     Iron deficiency anemia secondary to inadequate dietary iron intake 12/28/2004     Priority: Medium     relates to gastric bypass         Medications:  Current Outpatient Medications   Medication Sig Dispense Refill     acetaminophen (TYLENOL) 500 MG  tablet Take 1 tablet (500 mg) by mouth every 6 hours       amylase-lipase-protease (VIOKACE) 99093-67547 units TABS tablet Take 4-5 with meals and 2 with snacks (Patient taking differently: Take 4-5 with big meal  2-3 with small meals and snacks) 500 tablet 11     calcium carbonate 600 mg-vitamin D 400 units (CALTRATE) 600-400 MG-UNIT per tablet Take 1 tablet by mouth daily       Continuous Blood Gluc  (DEXCOM G6 ) MODESTA Use as directed to check blood glucose levels 1 each 1     Continuous Blood Gluc Sensor (DEXCOM G6 SENSOR) MISC Change every 10 days 3 each 11     Continuous Blood Gluc Transmit (DEXCOM G6 TRANSMITTER) MISC Change every 3 months 1 each 3     DULoxetine (CYMBALTA) 20 MG capsule Take 20 mg by mouth daily       escitalopram (LEXAPRO) 20 MG tablet Take 20 mg by mouth daily       estradiol (ESTRACE) 0.1 MG/GM vaginal cream PLACE 2 GRAMS VAGINALLY 2 TIMES WEEKLY (Patient taking differently: PLACE 2 GRAMS VAGINALLY 2 TIMES WEEKLY  Thursdays and Sundays) 42.5 g 1     famotidine (PEPCID) 40 MG tablet Take 1 tablet (40 mg) by mouth 2 times daily as needed for heartburn 180 tablet 3     FIASP PENFILL 100 UNIT/ML SOCT Inject 0.5-4 Units Subcutaneous 4 times daily (with meals and nightly) (Patient taking differently: Inject 0.5-4 Units Subcutaneous 3 times daily ) 15 mL 5     gabapentin (NEURONTIN) 300 MG capsule Take 300 mg by mouth nightly as needed       Glucagon (GVOKE HYPOPEN 1-PACK) 1 MG/0.2ML SOAJ Inject 1 mg Subcutaneous once as needed (for hypoglycemia with loss of consciousness) 15 mL 5     glucose 40 % GEL Take 15-30 g by mouth every 15 minutes as needed. 1 Tube 11     hydrOXYzine (ATARAX) 25 MG tablet TAKE 1-2 TABLETS BY MOUTH 2 TIMES DAILY AS NEEDED FOR ANXIETY  0     insulin glargine (LANTUS PEN) 100 UNIT/ML pen Inject 5 Units Subcutaneous At Bedtime Inject 6 units daily 15 mL 11     insulin pen needle (32G X 4 MM) 32G X 4 MM miscellaneous Use 6 pen needles daily 100 each 11      levothyroxine (SYNTHROID/LEVOTHROID) 100 MCG tablet Take 1 tablet (100 mcg) by mouth daily 90 tablet 3     loratadine (CLARITIN) 10 MG tablet Take 10 mg by mouth daily as needed        methocarbamol (ROBAXIN) 500 MG tablet Take 1 tablet (500 mg) by mouth 3 times daily as needed for muscle spasms 15 tablet 0     modafinil (PROVIGIL) 200 MG tablet Take 400 mg by mouth daily       omeprazole (PRILOSEC) 40 MG DR capsule Take 1 capsule (40 mg) by mouth 2 times daily Take 30-60 minutes before a meal. 180 capsule 3     ondansetron (ZOFRAN) 4 MG tablet Take 1 tablet (4 mg) by mouth every 6 hours At 8:00 am, 2:00 pm, 8:00 pm the day prior to your surgery with neomycin and flagyl. 3 tablet 0     polyethylene glycol (MIRALAX) 17 GM/Dose powder Take 17 g by mouth 2 times daily 510 g 1     STATIN NOT PRESCRIBED (INTENTIONAL) Please choose reason not prescribed from choices below.       traZODone (DESYREL) 50 MG tablet Take 50 mg by mouth nightly as needed for sleep       fluconazole (DIFLUCAN) 100 MG tablet Take 1 tablet (100 mg) by mouth daily (Patient not taking: Reported on 2/17/2022) 3 tablet 0     oxyCODONE (ROXICODONE) 5 MG tablet Take 1 tablet (5 mg) by mouth every 6 hours as needed for severe pain (Patient not taking: Reported on 2/23/2022) 15 tablet 0     oxyCODONE (ROXICODONE) 5 MG tablet Take 1-2 tablets (5-10 mg) by mouth every 3 hours as needed for moderate to severe pain (Patient not taking: Reported on 2/23/2022) 15 tablet 0       Allergies:  Allergies   Allergen Reactions     Corticosteroids Other (See Comments)     All oral, IV and injectable steroids are contraindicated (unless in life threatening situations) in Islet Auto transplant recipients. They can cause irreversible loss of islet cell function. Please contact patient's transplant care coordinator, Erlinda Multani RN BSN at [phone]/pager: [phone] and endocrinologist prior to administration.       Povidone Iodine Hives     Causes skin to  blister     Naproxen      Other reaction(s): Abdominal Pain  Pt allergic to Naprosyn     Nsaids      naprosyn = GI upset     Povidone Iodine      blisters     Sulfasalazine Nausea and Nausea and Vomiting     Iodine Rash       Family history:  Family History   Problem Relation Age of Onset     Family History Negative Mother      Respiratory Father         COPD;  at 69     Genitourinary Problems Father         kidney stones     Substance Abuse Father      Depression Father      Asthma Father      Heart Disease Paternal Grandfather         M.I.     Coronary Artery Disease Paternal Grandfather      Hyperlipidemia Paternal Grandfather      Genitourinary Problems Brother         multiple brothers with kidney stones     Gastrointestinal Disease Maternal Grandmother         undiagnosed 'gut' issues     Coronary Artery Disease Maternal Grandfather      Hyperlipidemia Maternal Grandfather      Cerebrovascular Disease Paternal Grandmother         At the age of 103     Anxiety Disorder Paternal Grandmother      Osteoporosis Paternal Grandmother      Anxiety Disorder Son      Anxiety Disorder Daughter      Asthma Daughter      Colon Cancer No family hx of        Social history:  Social History     Tobacco Use     Smoking status: Never Smoker     Smokeless tobacco: Never Used   Substance Use Topics     Alcohol use: Not Currently     Alcohol/week: 0.0 standard drinks     Marital status: .    There are no exam notes on file for this visit.     30 minutes spent on the date of the encounter doing chart review, history and exam, documentation and further activities as noted above.   This is a postop visit.    MITZY Hancock, NP-C  Colon and Rectal Surgery  [facility]    This note was created using speech recognition software and may contain unintended word substitutions.

## 2022-02-24 ENCOUNTER — OFFICE VISIT (OUTPATIENT)
Dept: UROLOGY | Facility: CLINIC | Age: 57
End: 2022-02-24
Payer: COMMERCIAL

## 2022-02-24 VITALS
WEIGHT: 110 LBS | SYSTOLIC BLOOD PRESSURE: 165 MMHG | BODY MASS INDEX: 19.49 KG/M2 | HEART RATE: 62 BPM | DIASTOLIC BLOOD PRESSURE: 85 MMHG | HEIGHT: 63 IN

## 2022-02-24 DIAGNOSIS — Z87.42 HISTORY OF UTERINE PROLAPSE: Primary | ICD-10-CM

## 2022-02-24 DIAGNOSIS — N39.41 URGE INCONTINENCE: ICD-10-CM

## 2022-02-24 DIAGNOSIS — R39.15 URINARY URGENCY: ICD-10-CM

## 2022-02-24 PROCEDURE — 99024 POSTOP FOLLOW-UP VISIT: CPT | Performed by: UROLOGY

## 2022-02-24 ASSESSMENT — PAIN SCALES - GENERAL: PAINLEVEL: MILD PAIN (2)

## 2022-02-24 NOTE — NURSING NOTE
"Chief Complaint   Patient presents with     Surgical Followup       Blood pressure (!) 165/85, pulse 62, height 1.6 m (5' 3\"), weight 49.9 kg (110 lb), last menstrual period 12/19/2013, not currently breastfeeding. Body mass index is 19.49 kg/m .    Patient Active Problem List   Diagnosis     Iron deficiency anemia secondary to inadequate dietary iron intake     Gastric bypass status for obesity     Health Care Home     Chronic pain syndrome     Exocrine pancreatic insufficiency     Asplenia     BLU (obstructive sleep apnea)     S/P exploratory laparotomy     Dysthymia     Anxiety     Thyroid nodule     Acquired hypothyroidism     Post-pancreatectomy diabetes (H)     Other iron deficiency anemia     Uterovaginal prolapse, unspecified     Rectal prolapse     Small bowel obstruction (H)     Acute kidney injury (H)       Allergies   Allergen Reactions     Corticosteroids Other (See Comments)     All oral, IV and injectable steroids are contraindicated (unless in life threatening situations) in Islet Auto transplant recipients. They can cause irreversible loss of islet cell function. Please contact patient's transplant care coordinator, Erlinda Multani RN BSN at [phone]/pager: [phone] and endocrinologist prior to administration.       Povidone Iodine Hives     Causes skin to blister     Naproxen      Other reaction(s): Abdominal Pain  Pt allergic to Naprosyn     Nsaids      naprosyn = GI upset     Povidone Iodine      blisters     Sulfasalazine Nausea and Nausea and Vomiting     Iodine Rash       Current Outpatient Medications   Medication Sig Dispense Refill     acetaminophen (TYLENOL) 500 MG tablet Take 1 tablet (500 mg) by mouth every 6 hours       amylase-lipase-protease (VIOKACE) 88627-07138 units TABS tablet Take 4-5 with meals and 2 with snacks (Patient taking differently: Take 4-5 with big meal  2-3 with small meals and snacks) 500 tablet 11     calcium carbonate 600 mg-vitamin D 400 units (CALTRATE) " 600-400 MG-UNIT per tablet Take 1 tablet by mouth daily       Continuous Blood Gluc  (DEXCOM G6 ) MODESTA Use as directed to check blood glucose levels 1 each 1     Continuous Blood Gluc Sensor (DEXCOM G6 SENSOR) MISC Change every 10 days 3 each 11     Continuous Blood Gluc Transmit (DEXCOM G6 TRANSMITTER) MISC Change every 3 months 1 each 3     DULoxetine (CYMBALTA) 20 MG capsule Take 20 mg by mouth daily       escitalopram (LEXAPRO) 20 MG tablet Take 20 mg by mouth daily       estradiol (ESTRACE) 0.1 MG/GM vaginal cream PLACE 2 GRAMS VAGINALLY 2 TIMES WEEKLY (Patient taking differently: PLACE 2 GRAMS VAGINALLY 2 TIMES WEEKLY  Thursdays and Sundays) 42.5 g 1     famotidine (PEPCID) 40 MG tablet Take 1 tablet (40 mg) by mouth 2 times daily as needed for heartburn 180 tablet 3     FIASP PENFILL 100 UNIT/ML SOCT Inject 0.5-4 Units Subcutaneous 4 times daily (with meals and nightly) (Patient taking differently: Inject 0.5-4 Units Subcutaneous 3 times daily ) 15 mL 5     fluconazole (DIFLUCAN) 100 MG tablet Take 1 tablet (100 mg) by mouth daily (Patient not taking: Reported on 2/17/2022) 3 tablet 0     gabapentin (NEURONTIN) 300 MG capsule Take 300 mg by mouth nightly as needed       Glucagon (GVOKE HYPOPEN 1-PACK) 1 MG/0.2ML SOAJ Inject 1 mg Subcutaneous once as needed (for hypoglycemia with loss of consciousness) 15 mL 5     glucose 40 % GEL Take 15-30 g by mouth every 15 minutes as needed. 1 Tube 11     hydrOXYzine (ATARAX) 25 MG tablet TAKE 1-2 TABLETS BY MOUTH 2 TIMES DAILY AS NEEDED FOR ANXIETY  0     insulin glargine (LANTUS PEN) 100 UNIT/ML pen Inject 5 Units Subcutaneous At Bedtime Inject 6 units daily 15 mL 11     insulin pen needle (32G X 4 MM) 32G X 4 MM miscellaneous Use 6 pen needles daily 100 each 11     levothyroxine (SYNTHROID/LEVOTHROID) 100 MCG tablet Take 1 tablet (100 mcg) by mouth daily 90 tablet 3     loratadine (CLARITIN) 10 MG tablet Take 10 mg by mouth daily as needed         methocarbamol (ROBAXIN) 500 MG tablet Take 1 tablet (500 mg) by mouth 3 times daily as needed for muscle spasms 15 tablet 0     modafinil (PROVIGIL) 200 MG tablet Take 400 mg by mouth daily       omeprazole (PRILOSEC) 40 MG DR capsule Take 1 capsule (40 mg) by mouth 2 times daily Take 30-60 minutes before a meal. 180 capsule 3     ondansetron (ZOFRAN) 4 MG tablet Take 1 tablet (4 mg) by mouth every 6 hours At 8:00 am, 2:00 pm, 8:00 pm the day prior to your surgery with neomycin and flagyl. 3 tablet 0     oxyCODONE (ROXICODONE) 5 MG tablet Take 1 tablet (5 mg) by mouth every 6 hours as needed for severe pain (Patient not taking: Reported on 2/23/2022) 15 tablet 0     oxyCODONE (ROXICODONE) 5 MG tablet Take 1-2 tablets (5-10 mg) by mouth every 3 hours as needed for moderate to severe pain (Patient not taking: Reported on 2/23/2022) 15 tablet 0     polyethylene glycol (MIRALAX) 17 GM/Dose powder Take 17 g by mouth 2 times daily 510 g 1     STATIN NOT PRESCRIBED (INTENTIONAL) Please choose reason not prescribed from choices below.       traZODone (DESYREL) 50 MG tablet Take 50 mg by mouth nightly as needed for sleep         Social History     Tobacco Use     Smoking status: Never Smoker     Smokeless tobacco: Never Used   Substance Use Topics     Alcohol use: Not Currently     Alcohol/week: 0.0 standard drinks     Drug use: Not Currently       Alicia Hutchinson  2/24/2022  10:20 AM

## 2022-02-24 NOTE — LETTER
"2/24/2022       RE: Lynn Thompson  [address] [facility] [id number]     Dear Colleague,    Thank you for referring your patient, Lynn Thompson, to the [facility] UROLOGY CLINIC [place] at [facility]. Please see a copy of my visit note below.    February 24, 2022    Post operative visit    Lynn Thompson is a 57-year old female with history of multiple prior abdominal surgeries. Patient returns today for follow up s/p the following procedures performed by Dr. Rivera and Dr. Uriah Sheridan on 2/1/2022: open rectopexy, abdominal supracervical hysterectomy with BSO, uterosacral ligament suspension, Gan colposuspension, and cystoscopy. She is accompanied by her .    She says she is feeling well today and she denies any changes in her health since last visit. Pain is 2/10 and well-controlled with Tylenol or ibuprofen as needed. Denies bleeding or discharge, redness or swelling at surgical sites.    She feels her urinary incontinence has improved since surgery. She is now incontinent 1-2 times per day. She continues to have urgency when drinking fluids and in the middle of the night, but feels the bladder spasm medication helps. Voids every 2-3 hours in daytime and nocturia 1x/night. No constipation, and she is taking stool softener.    Lynn is following the 6-week weight lifting restrictions. She is looking forward to being able to lift again to help her care for Survivor, her foster dog who uses a wheelchair.    BP (!) 165/85   Pulse 62   Ht 1.6 m (5' 3\")   Wt 49.9 kg (110 lb)   LMP 12/19/2013   BMI 19.49 kg/m    She is comfortable, in no distress, non-labored breathing.  Abdomen is soft, non-tender, non-distended. Well-healing abdominal incisions clean, dry, and intact.  Normal external female genitalia.  Negative CST.  Speculum and bimanual exam are remarkable for well supported apex without any evidence of sutures    PVR " 30 mL by bladder scan    A/P: 57 year old F with history of multiple prior abdominal surgeries. Patient returns today for follow up s/p open repair of rectocele and total hysterectomy with Gan colposuspension and uterosacral ligament suspension performed by Dr. Rivera and Dr. Uriah Sheridan on 2/1/2022.    Doing well after surgery. Will continue following weight limits for lifting for remainder of 6-week period from surgery. Discussed with patient that if her urge incontinence persists, can discuss treatment    RTC 3 months, sooner if needed    Hannah Thompson, MS4  Fourth Year Medical Student  [employer] [employer]    Attestation:  I agree with the PFSH and ROS as completed by the MS, except for changes made above as needed.  The remainder of the encounter was performed by me and scribed by the MS.  The scribed note accurately reflects my personal services and the decisions made by me.    Nicole Rivera MD MPH    Urology

## 2022-02-24 NOTE — PROGRESS NOTES
"February 24, 2022    Post operative visit    Lynn Thompson is a 57-year old female with history of multiple prior abdominal surgeries. Patient returns today for follow up s/p the following procedures performed by Dr. Rivera and Dr. Uriah Sheridan on 2/1/2022: open rectopexy, abdominal supracervical hysterectomy with BSO, uterosacral ligament suspension, Gan colposuspension, and cystoscopy. She is accompanied by her .    She says she is feeling well today and she denies any changes in her health since last visit. Pain is 2/10 and well-controlled with Tylenol or ibuprofen as needed. Denies bleeding or discharge, redness or swelling at surgical sites.    She feels her urinary incontinence has improved since surgery. She is now incontinent 1-2 times per day. She continues to have urgency when drinking fluids and in the middle of the night, but feels the bladder spasm medication helps. Voids every 2-3 hours in daytime and nocturia 1x/night. No constipation, and she is taking stool softener.    Lynn is following the 6-week weight lifting restrictions. She is looking forward to being able to lift again to help her care for Survivor, her foster dog who uses a wheelchair.    BP (!) 165/85   Pulse 62   Ht 1.6 m (5' 3\")   Wt 49.9 kg (110 lb)   LMP 12/19/2013   BMI 19.49 kg/m    She is comfortable, in no distress, non-labored breathing.  Abdomen is soft, non-tender, non-distended. Well-healing abdominal incisions clean, dry, and intact.  Normal external female genitalia.  Negative CST.  Speculum and bimanual exam are remarkable for well supported apex without any evidence of sutures    PVR 30 mL by bladder scan    A/P: 57 year old F with history of multiple prior abdominal surgeries. Patient returns today for follow up s/p open repair of rectocele and total hysterectomy with Gan colposuspension and uterosacral ligament suspension performed by Dr. Rivera and Dr. Uriah Sheridan on 2/1/2022.    Doing well " after surgery. Will continue following weight limits for lifting for remainder of 6-week period from surgery. Discussed with patient that if her urge incontinence persists, can discuss treatment    RTC 3 months, sooner if needed    Hannah Thompson, MS4  Fourth Year Medical Student  [employer]    Attestation:  I agree with the PFSH and ROS as completed by the MS, except for changes made above as needed.  The remainder of the encounter was performed by me and scribed by the MS.  The scribed note accurately reflects my personal services and the decisions made by me.    Nicole Rivera MD MPH    Urology

## 2022-02-24 NOTE — PATIENT INSTRUCTIONS
Websites with free information:    [employer] patient website: www.voicesforpfd.org    Total Control Program: www.totalcontrolprogram.com    Continue your postoperative restrictions    Return in 3 months, sooner if needed    It was a pleasure meeting with you today.  Thank you for allowing me and my team the privilege of caring for you today.  YOU are the reason we are here, and I truly hope we provided you with the excellent service you deserve.  Please let us know if there is anything else we can do for you so that we can be sure you are leaving completely satisfied with your care experience.

## 2022-03-07 ENCOUNTER — LAB (OUTPATIENT)
Dept: ONCOLOGY | Facility: CLINIC | Age: 57
End: 2022-03-07
Attending: INTERNAL MEDICINE
Payer: COMMERCIAL

## 2022-03-07 DIAGNOSIS — D50.8 OTHER IRON DEFICIENCY ANEMIA: ICD-10-CM

## 2022-03-07 LAB
BASOPHILS # BLD AUTO: 0.1 10E3/UL (ref 0–0.2)
BASOPHILS NFR BLD AUTO: 2 %
EOSINOPHIL # BLD AUTO: 0.2 10E3/UL (ref 0–0.7)
EOSINOPHIL NFR BLD AUTO: 3 %
ERYTHROCYTE [DISTWIDTH] IN BLOOD BY AUTOMATED COUNT: 14 % (ref 10–15)
FERRITIN SERPL-MCNC: 235 NG/ML (ref 8–252)
HCT VFR BLD AUTO: 40.9 % (ref 35–47)
HGB BLD-MCNC: 12.4 G/DL (ref 11.7–15.7)
IMM GRANULOCYTES # BLD: 0 10E3/UL
IMM GRANULOCYTES NFR BLD: 0 %
IRON SATN MFR SERPL: 37 % (ref 15–46)
IRON SERPL-MCNC: 109 UG/DL (ref 35–180)
LYMPHOCYTES # BLD AUTO: 2.1 10E3/UL (ref 0.8–5.3)
LYMPHOCYTES NFR BLD AUTO: 33 %
MCH RBC QN AUTO: 30.1 PG (ref 26.5–33)
MCHC RBC AUTO-ENTMCNC: 30.3 G/DL (ref 31.5–36.5)
MCV RBC AUTO: 99 FL (ref 78–100)
MONOCYTES # BLD AUTO: 0.5 10E3/UL (ref 0–1.3)
MONOCYTES NFR BLD AUTO: 8 %
NEUTROPHILS # BLD AUTO: 3.5 10E3/UL (ref 1.6–8.3)
NEUTROPHILS NFR BLD AUTO: 54 %
NRBC # BLD AUTO: 0 10E3/UL
NRBC BLD AUTO-RTO: 0 /100
PLATELET # BLD AUTO: 312 10E3/UL (ref 150–450)
RBC # BLD AUTO: 4.12 10E6/UL (ref 3.8–5.2)
TIBC SERPL-MCNC: 293 UG/DL (ref 240–430)
WBC # BLD AUTO: 6.5 10E3/UL (ref 4–11)

## 2022-03-07 PROCEDURE — 82728 ASSAY OF FERRITIN: CPT | Performed by: INTERNAL MEDICINE

## 2022-03-07 PROCEDURE — 83550 IRON BINDING TEST: CPT | Performed by: INTERNAL MEDICINE

## 2022-03-07 PROCEDURE — 36415 COLL VENOUS BLD VENIPUNCTURE: CPT

## 2022-03-07 PROCEDURE — 85004 AUTOMATED DIFF WBC COUNT: CPT | Performed by: INTERNAL MEDICINE

## 2022-03-07 NOTE — PROGRESS NOTES
Medical Assistant Note:  Lynn Thompson presents today for blood draw.    Patient seen by provider today: No.   present during visit today: Not Applicable.    Concerns: No Concerns.    Procedure:  Labs drawn    Post Assessment:  Labs drawn without difficulty: Yes.    Discharge Plan:  Face to Face time: 10 min.    Face to Face Time: 10 min.    Sabrina Daily, CMA

## 2022-03-10 ENCOUNTER — VIRTUAL VISIT (OUTPATIENT)
Dept: ONCOLOGY | Facility: CLINIC | Age: 57
End: 2022-03-10
Attending: INTERNAL MEDICINE
Payer: COMMERCIAL

## 2022-03-10 ENCOUNTER — TELEPHONE (OUTPATIENT)
Dept: ONCOLOGY | Facility: CLINIC | Age: 57
End: 2022-03-10

## 2022-03-10 DIAGNOSIS — D50.8 OTHER IRON DEFICIENCY ANEMIA: Primary | ICD-10-CM

## 2022-03-10 PROCEDURE — 99212 OFFICE O/P EST SF 10 MIN: CPT | Mod: 95 | Performed by: INTERNAL MEDICINE

## 2022-03-10 NOTE — LETTER
3/10/2022         RE: Lynn Thompson  [address] [id number]        Dear Colleague,    Thank you for referring your patient, Lynn Thompson, to the [facility]. Please see a copy of my visit note below.    Lynn is a 57 year old who is being evaluated via a billable video visit.  Currently in state of [place].    How would you like to obtain your AVS? MyChart  If the video visit is dropped, the invitation should be resent by: Text to cell phone: [phone]  Will anyone else be joining your video visit? No     Video Start Time: 2:09 PM  Video-Visit Details    Type of service:  Video Visit    Video End Time:2:14 PM    Originating Location (pt. Location): Home    Distant Location (provider location):  [facility]     Platform used for Video Visit: CASTILLO Adams      [facility]    Hematology/Oncology Established Patient Note      Today's Date: 03/10/22    Reason for Follow-up: anemia      HISTORY OF PRESENT ILLNESS: Lynn Thompson is a 57 year old female with PMHx of total pancreatectomy, islet cell autotransplant, splenectomy on 5/10/13, post pancreatectomy diabetes, gastric bypass in 2001, surgery for small bowel obstruction, history of Crohn's disease s/p partial terminal ileum resection, who presents with anemia.       Lynn says that she has had anemia all of her life.  She was previously followed by Dr. Tom Malcolm at [facility] since 2004.  She was followed and treated for iron-deficiency anemia.  It was felt that it was due to poor absorption from her gastric bypass and various bowel surgeries in the past.  She was unable to tolerate oral and and could not absorb it.  She gets IV iron intermittently.  She says a round of IV iron would last her for several years.  She last saw Dr. Malcolm in 2019.      Patient has been anemic again.  She underwent colonoscopy on 4/1/21,  which found some polyps that were removed.  Due to unintentional weight loss, she underwent mammogram that was negative.  She had a CT abdomen/pelvis on 3/1/21 that was normal, other than non-specific proximal colitis.  She does not smoke.      She received Injectafer x 2 doses in May 2021.      INTERIM HISTORY: Lynn feels well.  She just had surgery for her rectal prolapse about a month ago.  She says that she recovered quickly and feels very well now.        REVIEW OF SYSTEMS:   14 point ROS was reviewed and is negative other than as noted above in HPI.       HOME MEDICATIONS:  Current Outpatient Medications   Medication Sig Dispense Refill     acetaminophen (TYLENOL) 500 MG tablet Take 1 tablet (500 mg) by mouth every 6 hours       amylase-lipase-protease (VIOKACE) 29269-12721 units TABS tablet Take 4-5 with meals and 2 with snacks (Patient taking differently: Take 4-5 with big meal  2-3 with small meals and snacks) 500 tablet 11     calcium carbonate 600 mg-vitamin D 400 units (CALTRATE) 600-400 MG-UNIT per tablet Take 1 tablet by mouth daily       Continuous Blood Gluc  (DEXCOM G6 ) MODESTA Use as directed to check blood glucose levels 1 each 1     Continuous Blood Gluc Sensor (DEXCOM G6 SENSOR) MISC Change every 10 days 3 each 11     Continuous Blood Gluc Transmit (DEXCOM G6 TRANSMITTER) MISC Change every 3 months 1 each 3     DULoxetine (CYMBALTA) 20 MG capsule Take 20 mg by mouth daily       escitalopram (LEXAPRO) 20 MG tablet Take 20 mg by mouth daily       estradiol (ESTRACE) 0.1 MG/GM vaginal cream PLACE 2 GRAMS VAGINALLY 2 TIMES WEEKLY (Patient taking differently: PLACE 2 GRAMS VAGINALLY 2 TIMES WEEKLY  Thursdays and Sundays) 42.5 g 1     famotidine (PEPCID) 40 MG tablet Take 1 tablet (40 mg) by mouth 2 times daily as needed for heartburn 180 tablet 3     FIASP PENFILL 100 UNIT/ML SOCT Inject 0.5-4 Units Subcutaneous 4 times daily (with meals and nightly) (Patient taking differently:  Inject 0.5-4 Units Subcutaneous 3 times daily ) 15 mL 5     fluconazole (DIFLUCAN) 100 MG tablet Take 1 tablet (100 mg) by mouth daily (Patient not taking: Reported on 2/17/2022) 3 tablet 0     gabapentin (NEURONTIN) 300 MG capsule Take 300 mg by mouth nightly as needed       Glucagon (GVOKE HYPOPEN 1-PACK) 1 MG/0.2ML SOAJ Inject 1 mg Subcutaneous once as needed (for hypoglycemia with loss of consciousness) 15 mL 5     glucose 40 % GEL Take 15-30 g by mouth every 15 minutes as needed. 1 Tube 11     hydrOXYzine (ATARAX) 25 MG tablet TAKE 1-2 TABLETS BY MOUTH 2 TIMES DAILY AS NEEDED FOR ANXIETY  0     insulin glargine (LANTUS PEN) 100 UNIT/ML pen Inject 5 Units Subcutaneous At Bedtime Inject 6 units daily 15 mL 11     insulin pen needle (32G X 4 MM) 32G X 4 MM miscellaneous Use 6 pen needles daily 100 each 11     levothyroxine (SYNTHROID/LEVOTHROID) 100 MCG tablet Take 1 tablet (100 mcg) by mouth daily 90 tablet 3     loratadine (CLARITIN) 10 MG tablet Take 10 mg by mouth daily as needed        methocarbamol (ROBAXIN) 500 MG tablet Take 1 tablet (500 mg) by mouth 3 times daily as needed for muscle spasms 15 tablet 0     modafinil (PROVIGIL) 200 MG tablet Take 400 mg by mouth daily       omeprazole (PRILOSEC) 40 MG DR capsule Take 1 capsule (40 mg) by mouth 2 times daily Take 30-60 minutes before a meal. 180 capsule 3     ondansetron (ZOFRAN) 4 MG tablet Take 1 tablet (4 mg) by mouth every 6 hours At 8:00 am, 2:00 pm, 8:00 pm the day prior to your surgery with neomycin and flagyl. 3 tablet 0     oxyCODONE (ROXICODONE) 5 MG tablet Take 1 tablet (5 mg) by mouth every 6 hours as needed for severe pain (Patient not taking: Reported on 2/23/2022) 15 tablet 0     oxyCODONE (ROXICODONE) 5 MG tablet Take 1-2 tablets (5-10 mg) by mouth every 3 hours as needed for moderate to severe pain (Patient not taking: Reported on 2/23/2022) 15 tablet 0     polyethylene glycol (MIRALAX) 17 GM/Dose powder Take 17 g by mouth 2 times daily  510 g 1     STATIN NOT PRESCRIBED (INTENTIONAL) Please choose reason not prescribed from choices below.       traZODone (DESYREL) 50 MG tablet Take 50 mg by mouth nightly as needed for sleep           ALLERGIES:  Allergies   Allergen Reactions     Corticosteroids Other (See Comments)     All oral, IV and injectable steroids are contraindicated (unless in life threatening situations) in Islet Auto transplant recipients. They can cause irreversible loss of islet cell function. Please contact patient's transplant care coordinator, Erlinda Multani RN BSN at [phone]/pager: [phone] and endocrinologist prior to administration.       Povidone Iodine Hives     Causes skin to blister     Naproxen      Other reaction(s): Abdominal Pain  Pt allergic to Naprosyn     Nsaids      naprosyn = GI upset     Povidone Iodine      blisters     Sulfasalazine Nausea and Nausea and Vomiting     Iodine Rash         PAST MEDICAL HISTORY:  Past Medical History:   Diagnosis Date     Benign paroxysmal positional vertigo     occ.      Calculus of kidney 05/2005    x1 on L side passed, several stones.  Has been tested for oxalate.     Chronic abdominal pain 07/17/2013     Chronic pain      Chronic pancreatitis 07/17/2013     Depression     also occ panic spells     Depressive disorder      Diabetes (H) 5/10/2013    Total Pancreatomy with Auto Islets Transplant     Dyspepsia 06/1999    H. pylori   treated     Headaches     still periodic HA's ;  often 5X/week     Hypertension 02/22/2016    Stress related     Iron deficiency anemia secondary to inadequate dietary iron intake 11/2003    relates to gastric bypass     Post-pancreatectomy diabetes melltius 05/17/2013     Sleep apnea     uses splint     Spasm of sphincter of Oddi     surgical + endoscopic stenting of pancreatic duct @ [facility] 5/23/06     Thyroid nodule 11/01/2016     Vaccination not carried out          PAST SURGICAL HISTORY:  Past Surgical History:   Procedure Laterality Date      ABDOMINOPLASTY  2002    Tummy tu     APPENDECTOMY  1990     BUNIONECTOMY Right 1998     CBD Stent placement  2002    CBD stent; Dr. Presley      SECTION       CHOLECYSTECTOMY       COLONOSCOPY   &     colonoscopy     COLONOSCOPY       COLONOSCOPY N/A 3/31/2021    Procedure: COLONOSCOPY INCOMPLETE Aborted due to incomplete prep  will need to take additional prep and return tomorrow 21;  Surgeon: Ihsan Saenz MD;  Location: [facility]     COMBINED CYSTOSCOPY, RETROGRADES, URETEROSCOPY, LASER HOLMIUM LITHOTRIPSY URETER(S), INSERT STENT Right 2015    Procedure: COMBINED CYSTOSCOPY, RETROGRADES, URETEROSCOPY, LASER HOLMIUM LITHOTRIPSY URETER(S), INSERT STENT;  Surgeon: Kennedi Aldana MD;  Location: [facility]     COMBINED CYSTOSCOPY, RETROGRADES, URETEROSCOPY, LASER HOLMIUM LITHOTRIPSY URETER(S), INSERT STENT Right 2015    Procedure: COMBINED CYSTOSCOPY, RETROGRADES, URETEROSCOPY, LASER HOLMIUM LITHOTRIPSY URETER(S), INSERT STENT;  Surgeon: Kennedi Aldana MD;  [facility]: [facility]     COSMETIC SURGERY  2002    Tummy tu     CYSTECTOMY OVARIAN BENIGN Right      CYSTOSCOPY, RETROGRADES, INSERT STENT URETER(S), COMBINED  10/02/2012    Procedure: COMBINED CYSTOSCOPY, RETROGRADES, INSERT STENT URETER(S);  COMBINED CYSTOSCOPY,  , INSERT LEFT STENT URETER;  Surgeon: Johny Baez MD;  Location: [facility]     ESOPHAGOSCOPY, GASTROSCOPY, DUODENOSCOPY (EGD), COMBINED N/A 2018    Procedure: COMBINED ESOPHAGOSCOPY, GASTROSCOPY, DUODENOSCOPY (EGD);  ESOPHAGOSCOPY, GASTROSCOPY, DUODENOSCOPY (EGD)    ;  Surgeon: Tamir Rodgers MD;  Location: [facility]     EXTRACORPOREAL SHOCK WAVE LITHOTRIPSY (ESWL)  10/16/2012    Procedure: EXTRACORPOREAL SHOCK WAVE LITHOTRIPSY (ESWL);  left EXTRACORPOREAL SHOCK WAVE LITHOTRIPSY (ESWL) ;  Surgeon: Johny Baez MD;  Location: [facility]     Gastric bypass NOS       HERNIA REPAIR  2015     HYSTERECTOMY SUPRACERVICAL, BILATERAL  SALPINGO-OOPHORECTOMY, COMBINED N/A 2/1/2022    Procedure: Abdominal supracervical hysterectomy, bilateral salpingooophrectomy;  Surgeon: Nicole Rivera MD;  [facility]     IRRIGATION AND DEBRIDEMENT HAND, COMBINED Left 10/30/2020    Procedure: Left hand sharp excisional debridement of skin, subcutaneous tissue and fat with a scalpel, 2 x 1 x 1 cm.;  Surgeon: Demian Renteria MD;  Location: [facility]     LAP, LYSIS OF ADHESIONS       LAPAROSCOPIC LYSIS ADHESIONS N/A 02/20/2015    Procedure: LAPAROSCOPIC LYSIS ADHESIONS;  Surgeon: Aaron Early MD;  [facility]     LAPAROSCOPIC LYSIS ADHESIONS N/A 12/29/2015    Procedure: LAPAROSCOPIC LYSIS ADHESIONS;  Surgeon: Aaron Early MD;  [facility]     PANCREATECTOMY, TRANSPLANT AUTO ISLET CELL, COMBINED  05/10/2013    Procedure: COMBINED PANCREATECTOMY, TRANSPLANT AUTO ISLET CELL;  Pancreatectomy, Auto Islet Cell Transplant   hernia repair, jejunostomy tube and liver biopsies with Anesthesia General with block;  Surgeon: Aaron Early MD;  [facility]     Partial ileum resection  1992     RECTOPEXY ABDOMINAL N/A 2/1/2022    Procedure: RECTOPEXY, ABDOMINAL;  Surgeon: Uriah Sheridan MD;  [facility]     REPAIR PTOSIS BROW BILATERAL Bilateral 06/09/2020    Procedure: BILATERAL BROW PTOSIS REPAIR;  Surgeon: Denise Alberts MD;  Location: [facility]     SACROCOLPOPEXY, CYSTOSCOPY, COMBINED N/A 2/1/2022    Procedure: Uterosacral ligament suspension, pina colposuspension with Cystoscopy;  Surgeon: Nicole Rivera MD;  [facility]     SIGMOIDOSCOPY FLEXIBLE N/A 2/1/2022    Procedure: SIGMOIDOSCOPY, FLEXIBLE;  Surgeon: Uriah Sheridan MD;  [facility]     Surgery for SBO  2015     TONSILLECTOMY, ADENOIDECTOMY, COMBINED  1997     TRANSPLANT  5/10/13    Pancreatic Auto-Islet Transplant         SOCIAL HISTORY:  Social History     Socioeconomic History     Marital status:      Spouse name: Tera Guevara  of children: 2     Years of education: Not on file     Highest education level: Some college, no degree   Occupational History     Occupation: customer service     Employer: [employer]   Tobacco Use     Smoking status: Never Smoker     Smokeless tobacco: Never Used   Substance and Sexual Activity     Alcohol use: Not Currently     Alcohol/week: 0.0 standard drinks     Drug use: Not Currently     Sexual activity: Yes     Partners: Male     Birth control/protection: Post-menopausal   Other Topics Concern     Parent/sibling w/ CABG, MI or angioplasty before 65F 55M? No   Social History Narrative     Not on file     Social Determinants of Health     Financial Resource Strain: Not on file   Food Insecurity: Not on file   Transportation Needs: Not on file   Physical Activity: Not on file   Stress: Not on file   Social Connections: Not on file   Intimate Partner Violence: Not At Risk     Fear of Current or Ex-Partner: No     Emotionally Abused: No     Physically Abused: No     Sexually Abused: No   Housing Stability: Not on file         FAMILY HISTORY:  Family History   Problem Relation Age of Onset     Family History Negative Mother      Respiratory Father         COPD;  at 69     Genitourinary Problems Father         kidney stones     Substance Abuse Father      Depression Father      Asthma Father      Heart Disease Paternal Grandfather         M.I.     Coronary Artery Disease Paternal Grandfather      Hyperlipidemia Paternal Grandfather      Genitourinary Problems Brother         multiple brothers with kidney stones     Gastrointestinal Disease Maternal Grandmother         undiagnosed 'gut' issues     Coronary Artery Disease Maternal Grandfather      Hyperlipidemia Maternal Grandfather      Cerebrovascular Disease Paternal Grandmother         At the age of 103     Anxiety Disorder Paternal Grandmother      Osteoporosis Paternal Grandmother      Anxiety Disorder Son      Anxiety Disorder Daughter      Asthma  Daughter      Colon Cancer No family hx of          PHYSICAL EXAM:  ECO  GENERAL/CONSTITUTIONAL: No acute distress. Healthy, alert.  RESPIRATORY: No audible wheeze, cough, or visible cyanosis.  No visible retractions or increased work of breathing.  Able to speak fully in complete sentences.  NEUROLOGIC: Alert, oriented, answers questions appropriately. No tremor. Mentation intact and speech normal  INTEGUMENTARY: No jaundice.    PSYCHIATRIC:  Mentation appears normal, affect normal/bright, judgement and insight intact, normal speech and appearance well-groomed.    The rest of a comprehensive physical exam is deferred due to public health emergency video visit restrictions.        LABS:  CBC RESULTS: Recent Labs   Lab Test 22  1122   WBC 6.5   RBC 4.12   HGB 12.4   HCT 40.9   MCV 99   MCH 30.1   MCHC 30.3*   RDW 14.0        Component      Latest Ref Rng & Units 5/10/2021 9/3/2021 10/7/2021 3/7/2022   Iron      35 - 180 ug/dL 65 97 80 109   Iron Binding Cap      240 - 430 ug/dL 378 261 244 293   Iron Saturation Index      15 - 46 % 17 37 33 37   % Retic      0.5 - 2.0 % 1.3      Absolute Retic      25 - 95 10e9/L 49.5      Vitamin B12      193 - 986 pg/mL 274 341 304    Ferritin      8 - 252 ng/mL 7 (L) 280 (H) 188 235   Folate      >5.4 ng/mL 18.6            ASSESSMENT/PLAN:  Lynn Thompson is a 57 year old female with:    1) Iron-deficiency anemia: chronic, likely related to poor absorption from her history of gastric bypass and bowel surgeries.  She had a colonoscopy on 21 that showed no bleeding.  She is unable to tolerate oral iron due to side effects and poor absorption.  She does get IV iron intermittently.      She last received Injectafer in May 2021, with normalization of her hemoglobin and iron.  She felt better afterwards as well.    Her hemoglobin decreased after surgery in 2022, but has since recovered.    Recent labs show normal hemoglobin and iron/ferritin.      -RTC  in ~6 months with repeat CBC, ferritin, iron.  Patient prefers video visit.    2) Rectal prolapse:  -s/p posterior sutured rectopexy, lysis of adhesions, and flexible sigmoidoscopy with Dr. Sheridan and supracervical hysterectomy with bilateral salpingo-oophorectomy and Gan procedure with Dr. Rivera on 2/1/2022.  -following with Dr. Sheridan and Dr. Nicole Urrutia MD  Hematology/Oncology  [facility]    Total time spent on day of visit, including review of tests, obtaining/reviewing separately obtained history, ordering medications/tests/procedures, communicating with PCP/consultants, and documenting in electronic medical record: 10 minutes        Again, thank you for allowing me to participate in the care of your patient.        Sincerely,        Liliya Urrutia MD

## 2022-03-10 NOTE — NURSING NOTE
Patient denies any changes since echeck-in regarding medication and allergies and states all information entered during echeck-in remains accurate.    Harika Ramirez,VF

## 2022-03-10 NOTE — TELEPHONE ENCOUNTER
Called patient to schedule follow up appointment from video visit today (03/10). No answer, LVM with scheduling number.

## 2022-03-10 NOTE — LETTER
3/10/2022         RE: Lynn Thompson  [address] [facility] [id number]        Dear Colleague,    Thank you for referring your patient, Lynn Thompson, to the [facility]. Please see a copy of my visit note below.    Lynn is a 57 year old who is being evaluated via a billable video visit.  Currently in state of [place].    How would you like to obtain your AVS? MyChart  If the video visit is dropped, the invitation should be resent by: Text to cell phone: [phone]  Will anyone else be joining your video visit? No     Video Start Time: 2:09 PM  Video-Visit Details    Type of service:  Video Visit    Video End Time:2:14 PM    Originating Location (pt. Location): Home    Distant Location (provider location):  [facility]     Platform used for Video Visit: CASTILLO Adams      [facility]    Hematology/Oncology Established Patient Note      Today's Date: 03/10/22    Reason for Follow-up: anemia      HISTORY OF PRESENT ILLNESS: Lynn Thompson is a 57 year old female with PMHx of total pancreatectomy, islet cell autotransplant, splenectomy on 5/10/13, post pancreatectomy diabetes, gastric bypass in 2001, surgery for small bowel obstruction, history of Crohn's disease s/p partial terminal ileum resection, who presents with anemia.       Lynn says that she has had anemia all of her life.  She was previously followed by Dr. Tom Maloclm at [facility] since 2004.  She was followed and treated for iron-deficiency anemia.  It was felt that it was due to poor absorption from her gastric bypass and various bowel surgeries in the past.  She was unable to tolerate oral and and could not absorb it.  She gets IV iron intermittently.  She says a round of IV iron would last her for several years.  She last saw Dr. Malcolm in 2019.      Patient has been anemic again.  She underwent colonoscopy on 4/1/21,  which found some polyps that were removed.  Due to unintentional weight loss, she underwent mammogram that was negative.  She had a CT abdomen/pelvis on 3/1/21 that was normal, other than non-specific proximal colitis.  She does not smoke.      She received Injectafer x 2 doses in May 2021.      INTERIM HISTORY: Lynn feels well.  She just had surgery for her rectal prolapse about a month ago.  She says that she recovered quickly and feels very well now.        REVIEW OF SYSTEMS:   14 point ROS was reviewed and is negative other than as noted above in HPI.       HOME MEDICATIONS:  Current Outpatient Medications   Medication Sig Dispense Refill     acetaminophen (TYLENOL) 500 MG tablet Take 1 tablet (500 mg) by mouth every 6 hours       amylase-lipase-protease (VIOKACE) 27816-07844 units TABS tablet Take 4-5 with meals and 2 with snacks (Patient taking differently: Take 4-5 with big meal  2-3 with small meals and snacks) 500 tablet 11     calcium carbonate 600 mg-vitamin D 400 units (CALTRATE) 600-400 MG-UNIT per tablet Take 1 tablet by mouth daily       Continuous Blood Gluc  (DEXCOM G6 ) MODESTA Use as directed to check blood glucose levels 1 each 1     Continuous Blood Gluc Sensor (DEXCOM G6 SENSOR) MISC Change every 10 days 3 each 11     Continuous Blood Gluc Transmit (DEXCOM G6 TRANSMITTER) MISC Change every 3 months 1 each 3     DULoxetine (CYMBALTA) 20 MG capsule Take 20 mg by mouth daily       escitalopram (LEXAPRO) 20 MG tablet Take 20 mg by mouth daily       estradiol (ESTRACE) 0.1 MG/GM vaginal cream PLACE 2 GRAMS VAGINALLY 2 TIMES WEEKLY (Patient taking differently: PLACE 2 GRAMS VAGINALLY 2 TIMES WEEKLY  Thursdays and Sundays) 42.5 g 1     famotidine (PEPCID) 40 MG tablet Take 1 tablet (40 mg) by mouth 2 times daily as needed for heartburn 180 tablet 3     FIASP PENFILL 100 UNIT/ML SOCT Inject 0.5-4 Units Subcutaneous 4 times daily (with meals and nightly) (Patient taking differently:  Inject 0.5-4 Units Subcutaneous 3 times daily ) 15 mL 5     fluconazole (DIFLUCAN) 100 MG tablet Take 1 tablet (100 mg) by mouth daily (Patient not taking: Reported on 2/17/2022) 3 tablet 0     gabapentin (NEURONTIN) 300 MG capsule Take 300 mg by mouth nightly as needed       Glucagon (GVOKE HYPOPEN 1-PACK) 1 MG/0.2ML SOAJ Inject 1 mg Subcutaneous once as needed (for hypoglycemia with loss of consciousness) 15 mL 5     glucose 40 % GEL Take 15-30 g by mouth every 15 minutes as needed. 1 Tube 11     hydrOXYzine (ATARAX) 25 MG tablet TAKE 1-2 TABLETS BY MOUTH 2 TIMES DAILY AS NEEDED FOR ANXIETY  0     insulin glargine (LANTUS PEN) 100 UNIT/ML pen Inject 5 Units Subcutaneous At Bedtime Inject 6 units daily 15 mL 11     insulin pen needle (32G X 4 MM) 32G X 4 MM miscellaneous Use 6 pen needles daily 100 each 11     levothyroxine (SYNTHROID/LEVOTHROID) 100 MCG tablet Take 1 tablet (100 mcg) by mouth daily 90 tablet 3     loratadine (CLARITIN) 10 MG tablet Take 10 mg by mouth daily as needed        methocarbamol (ROBAXIN) 500 MG tablet Take 1 tablet (500 mg) by mouth 3 times daily as needed for muscle spasms 15 tablet 0     modafinil (PROVIGIL) 200 MG tablet Take 400 mg by mouth daily       omeprazole (PRILOSEC) 40 MG DR capsule Take 1 capsule (40 mg) by mouth 2 times daily Take 30-60 minutes before a meal. 180 capsule 3     ondansetron (ZOFRAN) 4 MG tablet Take 1 tablet (4 mg) by mouth every 6 hours At 8:00 am, 2:00 pm, 8:00 pm the day prior to your surgery with neomycin and flagyl. 3 tablet 0     oxyCODONE (ROXICODONE) 5 MG tablet Take 1 tablet (5 mg) by mouth every 6 hours as needed for severe pain (Patient not taking: Reported on 2/23/2022) 15 tablet 0     oxyCODONE (ROXICODONE) 5 MG tablet Take 1-2 tablets (5-10 mg) by mouth every 3 hours as needed for moderate to severe pain (Patient not taking: Reported on 2/23/2022) 15 tablet 0     polyethylene glycol (MIRALAX) 17 GM/Dose powder Take 17 g by mouth 2 times daily  510 g 1     STATIN NOT PRESCRIBED (INTENTIONAL) Please choose reason not prescribed from choices below.       traZODone (DESYREL) 50 MG tablet Take 50 mg by mouth nightly as needed for sleep           ALLERGIES:  Allergies   Allergen Reactions     Corticosteroids Other (See Comments)     All oral, IV and injectable steroids are contraindicated (unless in life threatening situations) in Islet Auto transplant recipients. They can cause irreversible loss of islet cell function. Please contact patient's transplant care coordinator, Erlinda Multani RN BSN at [phone]/pager: [phone] and endocrinologist prior to administration.       Povidone Iodine Hives     Causes skin to blister     Naproxen      Other reaction(s): Abdominal Pain  Pt allergic to Naprosyn     Nsaids      naprosyn = GI upset     Povidone Iodine      blisters     Sulfasalazine Nausea and Nausea and Vomiting     Iodine Rash         PAST MEDICAL HISTORY:  Past Medical History:   Diagnosis Date     Benign paroxysmal positional vertigo     occ.      Calculus of kidney 05/2005    x1 on L side passed, several stones.  Has been tested for oxalate.     Chronic abdominal pain 07/17/2013     Chronic pain      Chronic pancreatitis 07/17/2013     Depression     also occ panic spells     Depressive disorder      Diabetes (H) 5/10/2013    Total Pancreatomy with Auto Islets Transplant     Dyspepsia 06/1999    H. pylori   treated     Headaches     still periodic HA's ;  often 5X/week     Hypertension 02/22/2016    Stress related     Iron deficiency anemia secondary to inadequate dietary iron intake 11/2003    relates to gastric bypass     Post-pancreatectomy diabetes melltius 05/17/2013     Sleep apnea     uses splint     Spasm of sphincter of Oddi     surgical + endoscopic stenting of pancreatic duct @ [facility] [place] 5/23/06     Thyroid nodule 11/01/2016     Vaccination not carried out          PAST SURGICAL HISTORY:  Past Surgical History:   Procedure Laterality Date      ABDOMINOPLASTY  2002    Tummy tu     APPENDECTOMY  1990     BUNIONECTOMY Right 1998     CBD Stent placement  2002    CBD stent; Dr. Presley      SECTION       CHOLECYSTECTOMY       COLONOSCOPY   &     colonoscopy     COLONOSCOPY       COLONOSCOPY N/A 3/31/2021    Procedure: COLONOSCOPY INCOMPLETE Aborted due to incomplete prep  will need to take additional prep and return tomorrow 21;  Surgeon: Ihsan Saenz MD;  Location: [facility]     COMBINED CYSTOSCOPY, RETROGRADES, URETEROSCOPY, LASER HOLMIUM LITHOTRIPSY URETER(S), INSERT STENT Right 2015    Procedure: COMBINED CYSTOSCOPY, RETROGRADES, URETEROSCOPY, LASER HOLMIUM LITHOTRIPSY URETER(S), INSERT STENT;  Surgeon: Kennedi Aldana MD;  Location: [facility]     COMBINED CYSTOSCOPY, RETROGRADES, URETEROSCOPY, LASER HOLMIUM LITHOTRIPSY URETER(S), INSERT STENT Right 2015    Procedure: COMBINED CYSTOSCOPY, RETROGRADES, URETEROSCOPY, LASER HOLMIUM LITHOTRIPSY URETER(S), INSERT STENT;  Surgeon: Kennedi Aldana MD;  Location: [facility]     COSMETIC SURGERY  2002    Tummy tu     CYSTECTOMY OVARIAN BENIGN Right      CYSTOSCOPY, RETROGRADES, INSERT STENT URETER(S), COMBINED  10/02/2012    Procedure: COMBINED CYSTOSCOPY, RETROGRADES, INSERT STENT URETER(S);  COMBINED CYSTOSCOPY,  , INSERT LEFT STENT URETER;  Surgeon: Johny Baez MD;  Location: [facility]     ESOPHAGOSCOPY, GASTROSCOPY, DUODENOSCOPY (EGD), COMBINED N/A 2018    Procedure: COMBINED ESOPHAGOSCOPY, GASTROSCOPY, DUODENOSCOPY (EGD);  ESOPHAGOSCOPY, GASTROSCOPY, DUODENOSCOPY (EGD)    ;  Surgeon: Tamir Rodgers MD;  Location: [facility]     EXTRACORPOREAL SHOCK WAVE LITHOTRIPSY (ESWL)  10/16/2012    Procedure: EXTRACORPOREAL SHOCK WAVE LITHOTRIPSY (ESWL);  left EXTRACORPOREAL SHOCK WAVE LITHOTRIPSY (ESWL) ;  Surgeon: Johny Baez MD;  Location: [facility]     Gastric bypass NOS       HERNIA REPAIR  2015     HYSTERECTOMY SUPRACERVICAL, BILATERAL  SALPINGO-OOPHORECTOMY, COMBINED N/A 2/1/2022    Procedure: Abdominal supracervical hysterectomy, bilateral salpingooophrectomy;  Surgeon: Nicole Rivera MD;  [facility]     IRRIGATION AND DEBRIDEMENT HAND, COMBINED Left 10/30/2020    Procedure: Left hand sharp excisional debridement of skin, subcutaneous tissue and fat with a scalpel, 2 x 1 x 1 cm.;  Surgeon: Demian Renteria MD;  Location: [facility]     LAP, LYSIS OF ADHESIONS       LAPAROSCOPIC LYSIS ADHESIONS N/A 02/20/2015    Procedure: LAPAROSCOPIC LYSIS ADHESIONS;  Surgeon: Aaron Early MD;  [facility]     LAPAROSCOPIC LYSIS ADHESIONS N/A 12/29/2015    Procedure: LAPAROSCOPIC LYSIS ADHESIONS;  Surgeon: Aaron Early MD;  [facility]     PANCREATECTOMY, TRANSPLANT AUTO ISLET CELL, COMBINED  05/10/2013    Procedure: COMBINED PANCREATECTOMY, TRANSPLANT AUTO ISLET CELL;  Pancreatectomy, Auto Islet Cell Transplant   hernia repair, jejunostomy tube and liver biopsies with Anesthesia General with block;  Surgeon: Aaron Early MD;  [facility]     Partial ileum resection  1992     RECTOPEXY ABDOMINAL N/A 2/1/2022    Procedure: RECTOPEXY, ABDOMINAL;  Surgeon: Uriah Sheridan MD;  [facility]     REPAIR PTOSIS BROW BILATERAL Bilateral 06/09/2020    Procedure: BILATERAL BROW PTOSIS REPAIR;  Surgeon: Denise Alberts MD;  Location: [facility]     SACROCOLPOPEXY, CYSTOSCOPY, COMBINED N/A 2/1/2022    Procedure: Uterosacral ligament suspension, pina colposuspension with Cystoscopy;  Surgeon: Nicole Rivera MD;  [facility]     SIGMOIDOSCOPY FLEXIBLE N/A 2/1/2022    Procedure: SIGMOIDOSCOPY, FLEXIBLE;  Surgeon: Uriah Sheridan MD;  [facility]     Surgery for SBO  2015     TONSILLECTOMY, ADENOIDECTOMY, COMBINED  1997     TRANSPLANT  5/10/13    Pancreatic Auto-Islet Transplant         SOCIAL HISTORY:  Social History     Socioeconomic History     Marital status:      Spouse name: Tera Guevara  of children: 2     Years of education: Not on file     Highest education level: Some college, no degree   Occupational History     Occupation: customer service     Employer: [employer]   Tobacco Use     Smoking status: Never Smoker     Smokeless tobacco: Never Used   Substance and Sexual Activity     Alcohol use: Not Currently     Alcohol/week: 0.0 standard drinks     Drug use: Not Currently     Sexual activity: Yes     Partners: Male     Birth control/protection: Post-menopausal   Other Topics Concern     Parent/sibling w/ CABG, MI or angioplasty before 65F 55M? No   Social History Narrative     Not on file     Social Determinants of Health     Financial Resource Strain: Not on file   Food Insecurity: Not on file   Transportation Needs: Not on file   Physical Activity: Not on file   Stress: Not on file   Social Connections: Not on file   Intimate Partner Violence: Not At Risk     Fear of Current or Ex-Partner: No     Emotionally Abused: No     Physically Abused: No     Sexually Abused: No   Housing Stability: Not on file         FAMILY HISTORY:  Family History   Problem Relation Age of Onset     Family History Negative Mother      Respiratory Father         COPD;  at 69     Genitourinary Problems Father         kidney stones     Substance Abuse Father      Depression Father      Asthma Father      Heart Disease Paternal Grandfather         M.I.     Coronary Artery Disease Paternal Grandfather      Hyperlipidemia Paternal Grandfather      Genitourinary Problems Brother         multiple brothers with kidney stones     Gastrointestinal Disease Maternal Grandmother         undiagnosed 'gut' issues     Coronary Artery Disease Maternal Grandfather      Hyperlipidemia Maternal Grandfather      Cerebrovascular Disease Paternal Grandmother         At the age of 103     Anxiety Disorder Paternal Grandmother      Osteoporosis Paternal Grandmother      Anxiety Disorder Son      Anxiety Disorder Daughter      Asthma  Daughter      Colon Cancer No family hx of          PHYSICAL EXAM:  ECO  GENERAL/CONSTITUTIONAL: No acute distress. Healthy, alert.  RESPIRATORY: No audible wheeze, cough, or visible cyanosis.  No visible retractions or increased work of breathing.  Able to speak fully in complete sentences.  NEUROLOGIC: Alert, oriented, answers questions appropriately. No tremor. Mentation intact and speech normal  INTEGUMENTARY: No jaundice.    PSYCHIATRIC:  Mentation appears normal, affect normal/bright, judgement and insight intact, normal speech and appearance well-groomed.    The rest of a comprehensive physical exam is deferred due to public health emergency video visit restrictions.        LABS:  CBC RESULTS: Recent Labs   Lab Test 22  1122   WBC 6.5   RBC 4.12   HGB 12.4   HCT 40.9   MCV 99   MCH 30.1   MCHC 30.3*   RDW 14.0        Component      Latest Ref Rng & Units 5/10/2021 9/3/2021 10/7/2021 3/7/2022   Iron      35 - 180 ug/dL 65 97 80 109   Iron Binding Cap      240 - 430 ug/dL 378 261 244 293   Iron Saturation Index      15 - 46 % 17 37 33 37   % Retic      0.5 - 2.0 % 1.3      Absolute Retic      25 - 95 10e9/L 49.5      Vitamin B12      193 - 986 pg/mL 274 341 304    Ferritin      8 - 252 ng/mL 7 (L) 280 (H) 188 235   Folate      >5.4 ng/mL 18.6            ASSESSMENT/PLAN:  Lynn Thompson is a 57 year old female with:    1) Iron-deficiency anemia: chronic, likely related to poor absorption from her history of gastric bypass and bowel surgeries.  She had a colonoscopy on 21 that showed no bleeding.  She is unable to tolerate oral iron due to side effects and poor absorption.  She does get IV iron intermittently.      She last received Injectafer in May 2021, with normalization of her hemoglobin and iron.  She felt better afterwards as well.    Her hemoglobin decreased after surgery in 2022, but has since recovered.    Recent labs show normal hemoglobin and iron/ferritin.      -RTC  in ~6 months with repeat CBC, ferritin, iron.  Patient prefers video visit.    2) Rectal prolapse:  -s/p posterior sutured rectopexy, lysis of adhesions, and flexible sigmoidoscopy with Dr. Sheridan and supracervical hysterectomy with bilateral salpingo-oophorectomy and Gan procedure with Dr. Rivera on 2/1/2022.  -following with Dr. Sheridan and Dr. Nicole Urrutia MD  Hematology/Oncology  [facility]    Total time spent on day of visit, including review of tests, obtaining/reviewing separately obtained history, ordering medications/tests/procedures, communicating with PCP/consultants, and documenting in electronic medical record: 10 minutes        Again, thank you for allowing me to participate in the care of your patient.        Sincerely,        Liliya Urrutia MD

## 2022-03-10 NOTE — PROGRESS NOTES
Lynn is a 57 year old who is being evaluated via a billable video visit.  Currently in [facility].    How would you like to obtain your AVS? MyChart  If the video visit is dropped, the invitation should be resent by: Text to cell phone: [phone]  Will anyone else be joining your video visit? No     Video Start Time: 2:09 PM  Video-Visit Details    Type of service:  Video Visit    Video End Time:2:14 PM    Originating Location (pt. Location): Home    Distant Location (provider location):  [facility]     Platform used for Video Visit: CASTILLO Adams      [facility]    Hematology/Oncology Established Patient Note      Today's Date: 03/10/22    Reason for Follow-up: anemia      HISTORY OF PRESENT ILLNESS: Lynn Thompson is a 57 year old female with PMHx of total pancreatectomy, islet cell autotransplant, splenectomy on 5/10/13, post pancreatectomy diabetes, gastric bypass in 2001, surgery for small bowel obstruction, history of Crohn's disease s/p partial terminal ileum resection, who presents with anemia.       Lynn says that she has had anemia all of her life.  She was previously followed by Dr. Tom Malcolm at [facility] since 2004.  She was followed and treated for iron-deficiency anemia.  It was felt that it was due to poor absorption from her gastric bypass and various bowel surgeries in the past.  She was unable to tolerate oral and and could not absorb it.  She gets IV iron intermittently.  She says a round of IV iron would last her for several years.  She last saw Dr. Malcolm in 2019.      Patient has been anemic again.  She underwent colonoscopy on 4/1/21, which found some polyps that were removed.  Due to unintentional weight loss, she underwent mammogram that was negative.  She had a CT abdomen/pelvis on 3/1/21 that was normal, other than non-specific proximal colitis.  She does not smoke.      She received  Injectafer x 2 doses in May 2021.      INTERIM HISTORY: Lynn feels well.  She just had surgery for her rectal prolapse about a month ago.  She says that she recovered quickly and feels very well now.        REVIEW OF SYSTEMS:   14 point ROS was reviewed and is negative other than as noted above in HPI.       HOME MEDICATIONS:  Current Outpatient Medications   Medication Sig Dispense Refill     acetaminophen (TYLENOL) 500 MG tablet Take 1 tablet (500 mg) by mouth every 6 hours       amylase-lipase-protease (VIOKACE) 64113-56288 units TABS tablet Take 4-5 with meals and 2 with snacks (Patient taking differently: Take 4-5 with big meal  2-3 with small meals and snacks) 500 tablet 11     calcium carbonate 600 mg-vitamin D 400 units (CALTRATE) 600-400 MG-UNIT per tablet Take 1 tablet by mouth daily       Continuous Blood Gluc  (DEXCOM G6 ) MODESTA Use as directed to check blood glucose levels 1 each 1     Continuous Blood Gluc Sensor (DEXCOM G6 SENSOR) MISC Change every 10 days 3 each 11     Continuous Blood Gluc Transmit (DEXCOM G6 TRANSMITTER) MISC Change every 3 months 1 each 3     DULoxetine (CYMBALTA) 20 MG capsule Take 20 mg by mouth daily       escitalopram (LEXAPRO) 20 MG tablet Take 20 mg by mouth daily       estradiol (ESTRACE) 0.1 MG/GM vaginal cream PLACE 2 GRAMS VAGINALLY 2 TIMES WEEKLY (Patient taking differently: PLACE 2 GRAMS VAGINALLY 2 TIMES WEEKLY  Thursdays and Sundays) 42.5 g 1     famotidine (PEPCID) 40 MG tablet Take 1 tablet (40 mg) by mouth 2 times daily as needed for heartburn 180 tablet 3     FIASP PENFILL 100 UNIT/ML SOCT Inject 0.5-4 Units Subcutaneous 4 times daily (with meals and nightly) (Patient taking differently: Inject 0.5-4 Units Subcutaneous 3 times daily ) 15 mL 5     fluconazole (DIFLUCAN) 100 MG tablet Take 1 tablet (100 mg) by mouth daily (Patient not taking: Reported on 2/17/2022) 3 tablet 0     gabapentin (NEURONTIN) 300 MG capsule Take 300 mg by mouth nightly  as needed       Glucagon (GVOKE HYPOPEN 1-PACK) 1 MG/0.2ML SOAJ Inject 1 mg Subcutaneous once as needed (for hypoglycemia with loss of consciousness) 15 mL 5     glucose 40 % GEL Take 15-30 g by mouth every 15 minutes as needed. 1 Tube 11     hydrOXYzine (ATARAX) 25 MG tablet TAKE 1-2 TABLETS BY MOUTH 2 TIMES DAILY AS NEEDED FOR ANXIETY  0     insulin glargine (LANTUS PEN) 100 UNIT/ML pen Inject 5 Units Subcutaneous At Bedtime Inject 6 units daily 15 mL 11     insulin pen needle (32G X 4 MM) 32G X 4 MM miscellaneous Use 6 pen needles daily 100 each 11     levothyroxine (SYNTHROID/LEVOTHROID) 100 MCG tablet Take 1 tablet (100 mcg) by mouth daily 90 tablet 3     loratadine (CLARITIN) 10 MG tablet Take 10 mg by mouth daily as needed        methocarbamol (ROBAXIN) 500 MG tablet Take 1 tablet (500 mg) by mouth 3 times daily as needed for muscle spasms 15 tablet 0     modafinil (PROVIGIL) 200 MG tablet Take 400 mg by mouth daily       omeprazole (PRILOSEC) 40 MG DR capsule Take 1 capsule (40 mg) by mouth 2 times daily Take 30-60 minutes before a meal. 180 capsule 3     ondansetron (ZOFRAN) 4 MG tablet Take 1 tablet (4 mg) by mouth every 6 hours At 8:00 am, 2:00 pm, 8:00 pm the day prior to your surgery with neomycin and flagyl. 3 tablet 0     oxyCODONE (ROXICODONE) 5 MG tablet Take 1 tablet (5 mg) by mouth every 6 hours as needed for severe pain (Patient not taking: Reported on 2/23/2022) 15 tablet 0     oxyCODONE (ROXICODONE) 5 MG tablet Take 1-2 tablets (5-10 mg) by mouth every 3 hours as needed for moderate to severe pain (Patient not taking: Reported on 2/23/2022) 15 tablet 0     polyethylene glycol (MIRALAX) 17 GM/Dose powder Take 17 g by mouth 2 times daily 510 g 1     STATIN NOT PRESCRIBED (INTENTIONAL) Please choose reason not prescribed from choices below.       traZODone (DESYREL) 50 MG tablet Take 50 mg by mouth nightly as needed for sleep           ALLERGIES:  Allergies   Allergen Reactions     Corticosteroids  Other (See Comments)     All oral, IV and injectable steroids are contraindicated (unless in life threatening situations) in Islet Auto transplant recipients. They can cause irreversible loss of islet cell function. Please contact patient's transplant care coordinator, Erlinda Multani RN BSN at [phone]/pager: [phone] and endocrinologist prior to administration.       Povidone Iodine Hives     Causes skin to blister     Naproxen      Other reaction(s): Abdominal Pain  Pt allergic to Naprosyn     Nsaids      naprosyn = GI upset     Povidone Iodine      blisters     Sulfasalazine Nausea and Nausea and Vomiting     Iodine Rash         PAST MEDICAL HISTORY:  Past Medical History:   Diagnosis Date     Benign paroxysmal positional vertigo     occ.      Calculus of kidney 05/2005    x1 on L side passed, several stones.  Has been tested for oxalate.     Chronic abdominal pain 2013     Chronic pain      Chronic pancreatitis 2013     Depression     also occ panic spells     Depressive disorder      Diabetes (H) 5/10/2013    Total Pancreatomy with Auto Islets Transplant     Dyspepsia 1999    H. pylori   treated     Headaches     still periodic HA's ;  often 5X/week     Hypertension 2016    Stress related     Iron deficiency anemia secondary to inadequate dietary iron intake 2003    relates to gastric bypass     Post-pancreatectomy diabetes melltius 2013     Sleep apnea     uses splint     Spasm of sphincter of Oddi     surgical + endoscopic stenting of pancreatic duct @ [facility] 06     Thyroid nodule 2016     Vaccination not carried out          PAST SURGICAL HISTORY:  Past Surgical History:   Procedure Laterality Date     ABDOMINOPLASTY  2002    Tummy tuck     APPENDECTOMY  1990     BUNIONECTOMY Right 1998     CBD Stent placement  2002    CBD stent; Dr. Presley      SECTION       CHOLECYSTECTOMY       COLONOSCOPY   &     colonoscopy      COLONOSCOPY       COLONOSCOPY N/A 3/31/2021    Procedure: COLONOSCOPY INCOMPLETE Aborted due to incomplete prep  will need to take additional prep and return tomorrow 4/1/21;  Surgeon: Ihsan Saenz MD;  Location: [facility]     COMBINED CYSTOSCOPY, RETROGRADES, URETEROSCOPY, LASER HOLMIUM LITHOTRIPSY URETER(S), INSERT STENT Right 03/23/2015    Procedure: COMBINED CYSTOSCOPY, RETROGRADES, URETEROSCOPY, LASER HOLMIUM LITHOTRIPSY URETER(S), INSERT STENT;  Surgeon: Kennedi Aldana MD;  Location: [facility]     COMBINED CYSTOSCOPY, RETROGRADES, URETEROSCOPY, LASER HOLMIUM LITHOTRIPSY URETER(S), INSERT STENT Right 04/20/2015    Procedure: COMBINED CYSTOSCOPY, RETROGRADES, URETEROSCOPY, LASER HOLMIUM LITHOTRIPSY URETER(S), INSERT STENT;  Surgeon: Kennedi Aldana MD;  Location: [facility]     COSMETIC SURGERY  6/24/2002    Tummy tuck     CYSTECTOMY OVARIAN BENIGN Right      CYSTOSCOPY, RETROGRADES, INSERT STENT URETER(S), COMBINED  10/02/2012    Procedure: COMBINED CYSTOSCOPY, RETROGRADES, INSERT STENT URETER(S);  COMBINED CYSTOSCOPY,  , INSERT LEFT STENT URETER;  Surgeon: Johny Baez MD;  Location: [facility]     ESOPHAGOSCOPY, GASTROSCOPY, DUODENOSCOPY (EGD), COMBINED N/A 01/24/2018    Procedure: COMBINED ESOPHAGOSCOPY, GASTROSCOPY, DUODENOSCOPY (EGD);  ESOPHAGOSCOPY, GASTROSCOPY, DUODENOSCOPY (EGD)    ;  Surgeon: Tamir Rodgers MD;  Location: [facility]     EXTRACORPOREAL SHOCK WAVE LITHOTRIPSY (ESWL)  10/16/2012    Procedure: EXTRACORPOREAL SHOCK WAVE LITHOTRIPSY (ESWL);  left EXTRACORPOREAL SHOCK WAVE LITHOTRIPSY (ESWL) ;  Surgeon: Johny aBez MD;  Location: [facility]     Gastric bypass NOS       HERNIA REPAIR  02/2015     HYSTERECTOMY SUPRACERVICAL, BILATERAL SALPINGO-OOPHORECTOMY, COMBINED N/A 2/1/2022    Procedure: Abdominal supracervical hysterectomy, bilateral salpingooophrectomy;  Surgeon: Nicole Rivera MD;  [facility]     IRRIGATION AND DEBRIDEMENT HAND, COMBINED Left 10/30/2020     Procedure: Left hand sharp excisional debridement of skin, subcutaneous tissue and fat with a scalpel, 2 x 1 x 1 cm.;  Surgeon: Demian Renteria MD;  Location: [facility]     LAP, LYSIS OF ADHESIONS       LAPAROSCOPIC LYSIS ADHESIONS N/A 02/20/2015    Procedure: LAPAROSCOPIC LYSIS ADHESIONS;  Surgeon: Aaron Early MD;  [facility]     LAPAROSCOPIC LYSIS ADHESIONS N/A 12/29/2015    Procedure: LAPAROSCOPIC LYSIS ADHESIONS;  Surgeon: Aaron Early MD;  [facility]     PANCREATECTOMY, TRANSPLANT AUTO ISLET CELL, COMBINED  05/10/2013    Procedure: COMBINED PANCREATECTOMY, TRANSPLANT AUTO ISLET CELL;  Pancreatectomy, Auto Islet Cell Transplant   hernia repair, jejunostomy tube and liver biopsies with Anesthesia General with block;  Surgeon: Aaron Early MD;  [facility]     Partial ileum resection  1992     RECTOPEXY ABDOMINAL N/A 2/1/2022    Procedure: RECTOPEXY, ABDOMINAL;  Surgeon: Uriah Sheridan MD;  [facility]     REPAIR PTOSIS BROW BILATERAL Bilateral 06/09/2020    Procedure: BILATERAL BROW PTOSIS REPAIR;  Surgeon: Denise Alberts MD;  Location: [facility]     SACROCOLPOPEXY, CYSTOSCOPY, COMBINED N/A 2/1/2022    Procedure: Uterosacral ligament suspension, pina colposuspension with Cystoscopy;  Surgeon: Nicole Rivera MD;  [facility]     SIGMOIDOSCOPY FLEXIBLE N/A 2/1/2022    Procedure: SIGMOIDOSCOPY, FLEXIBLE;  Surgeon: Uriah Sheridan MD;  [facility]     Surgery for SBO  2015     TONSILLECTOMY, ADENOIDECTOMY, COMBINED  1997     TRANSPLANT  5/10/13    Pancreatic Auto-Islet Transplant         SOCIAL HISTORY:  Social History     Socioeconomic History     Marital status:      Spouse name: Tera     Number of children: 2     Years of education: Not on file     Highest education level: Some college, no degree   Occupational History     Occupation: customer service     Employer: [employer]   Tobacco Use     Smoking status: Never Smoker     Smokeless  tobacco: Never Used   Substance and Sexual Activity     Alcohol use: Not Currently     Alcohol/week: 0.0 standard drinks     Drug use: Not Currently     Sexual activity: Yes     Partners: Male     Birth control/protection: Post-menopausal   Other Topics Concern     Parent/sibling w/ CABG, MI or angioplasty before 65F 55M? No   Social History Narrative     Not on file     Social Determinants of Health     Financial Resource Strain: Not on file   Food Insecurity: Not on file   Transportation Needs: Not on file   Physical Activity: Not on file   Stress: Not on file   Social Connections: Not on file   Intimate Partner Violence: Not At Risk     Fear of Current or Ex-Partner: No     Emotionally Abused: No     Physically Abused: No     Sexually Abused: No   Housing Stability: Not on file         FAMILY HISTORY:  Family History   Problem Relation Age of Onset     Family History Negative Mother      Respiratory Father         COPD;  at 69     Genitourinary Problems Father         kidney stones     Substance Abuse Father      Depression Father      Asthma Father      Heart Disease Paternal Grandfather         M.I.     Coronary Artery Disease Paternal Grandfather      Hyperlipidemia Paternal Grandfather      Genitourinary Problems Brother         multiple brothers with kidney stones     Gastrointestinal Disease Maternal Grandmother         undiagnosed 'gut' issues     Coronary Artery Disease Maternal Grandfather      Hyperlipidemia Maternal Grandfather      Cerebrovascular Disease Paternal Grandmother         At the age of 103     Anxiety Disorder Paternal Grandmother      Osteoporosis Paternal Grandmother      Anxiety Disorder Son      Anxiety Disorder Daughter      Asthma Daughter      Colon Cancer No family hx of          PHYSICAL EXAM:  ECO  GENERAL/CONSTITUTIONAL: No acute distress. Healthy, alert.  RESPIRATORY: No audible wheeze, cough, or visible cyanosis.  No visible retractions or increased work of breathing.   Able to speak fully in complete sentences.  NEUROLOGIC: Alert, oriented, answers questions appropriately. No tremor. Mentation intact and speech normal  INTEGUMENTARY: No jaundice.    PSYCHIATRIC:  Mentation appears normal, affect normal/bright, judgement and insight intact, normal speech and appearance well-groomed.    The rest of a comprehensive physical exam is deferred due to [facility] emergency video visit restrictions.        LABS:  CBC RESULTS: Recent Labs   Lab Test 03/07/22  1122   WBC 6.5   RBC 4.12   HGB 12.4   HCT 40.9   MCV 99   MCH 30.1   MCHC 30.3*   RDW 14.0        Component      Latest Ref Rng & Units 5/10/2021 9/3/2021 10/7/2021 3/7/2022   Iron      35 - 180 ug/dL 65 97 80 109   Iron Binding Cap      240 - 430 ug/dL 378 261 244 293   Iron Saturation Index      15 - 46 % 17 37 33 37   % Retic      0.5 - 2.0 % 1.3      Absolute Retic      25 - 95 10e9/L 49.5      Vitamin B12      193 - 986 pg/mL 274 341 304    Ferritin      8 - 252 ng/mL 7 (L) 280 (H) 188 235   Folate      >5.4 ng/mL 18.6            ASSESSMENT/PLAN:  Lynn Thompson is a 57 year old female with:    1) Iron-deficiency anemia: chronic, likely related to poor absorption from her history of gastric bypass and bowel surgeries.  She had a colonoscopy on 4/1/21 that showed no bleeding.  She is unable to tolerate oral iron due to side effects and poor absorption.  She does get IV iron intermittently.      She last received Injectafer in May 2021, with normalization of her hemoglobin and iron.  She felt better afterwards as well.    Her hemoglobin decreased after surgery in February 2022, but has since recovered.    Recent labs show normal hemoglobin and iron/ferritin.      -RTC in ~6 months with repeat CBC, ferritin, iron.  Patient prefers video visit.    2) Rectal prolapse:  -s/p posterior sutured rectopexy, lysis of adhesions, and flexible sigmoidoscopy with Dr. Sheridan and supracervical hysterectomy with bilateral  salpingo-oophorectomy and Gan procedure with Dr. Rivera on 2/1/2022.  -following with Dr. Sheridan and Dr. Nicole Urrutia MD  Hematology/Oncology  [facility]    Total time spent on day of visit, including review of tests, obtaining/reviewing separately obtained history, ordering medications/tests/procedures, communicating with PCP/consultants, and documenting in electronic medical record: 10 minutes

## 2022-03-11 NOTE — PATIENT INSTRUCTIONS
Per chart review, appt request order has been deferred until 3/18 by NASEEM Phelan, RN, BSN, FABIOLA  RN Care Coordinator  [facility]  [phone]

## 2022-03-14 ENCOUNTER — TRANSFERRED RECORDS (OUTPATIENT)
Dept: HEALTH INFORMATION MANAGEMENT | Facility: CLINIC | Age: 57
End: 2022-03-14
Payer: MEDICARE

## 2022-03-14 NOTE — PROGRESS NOTES
"Colon and Rectal Surgery Follow-up Clinic Note      RE: Lynn Thompson  : 1965  FAUSTO: 3/21/2022    DIAGNOSIS: 57 year old female with history of total pancreatectomy with islet autotransplant in , splenectomy, choledochoduodenostomy, gastric bypass, appendectomy, ileal resection in  for Crohn's disease, hernia repair, lysis of adhesions x2, and a small bowel obstruction in . Crohn's has been in remission for over 20 years and she has not needed to be on any medications for this.  She has a history of constipation and developed full-thickness rectal prolapse. She is now status post posterior sutured rectopexy, lysis of adhesions, and flexible sigmoidoscopy with me and supracervical hysterectomy with bilateral salpingo-oophorectomy and Gan procedure with Dr. Rivera on 2022.    INTERVAL HISTORY: Denies increased pain, fevers, or chills. Tolerating diet well. Having 2-3 mushy to formed BM's per day with improving urgency but no incontinence.  Denies anal protrusion.  Off narcotic pain meds.    Physical Examination:  /70 (BP Location: Left arm, Patient Position: Sitting, Cuff Size: Adult Regular)   Pulse 85   Ht 5' 4\"   Wt 113 lb 4.8 oz   LMP 2013   SpO2 100%   BMI 19.45 kg/m    Abdomen soft, nontender. Incisions with no evidence of infection or hernia.    ASSESSMENT  Doing well postop.    Final Diagnosis    Uterus, bilateral fallopian tubes, and ovaries, supracervical hysterectomy with bilateral salpingo-oophorectomy:  -Inactive endometrium  -Adenomyosis  -Bilateral fallopian tubes, no significant histologic abnormalities  -Bilateral ovaries with atrophic changes  -Negative for malignancy       PLAN  1.  High-fiber diet. Continue Citrucel or fiber supplement twice daily.  2.  MiraLAX daily as needed for constipation.  3.  No activity restrictions.  Avoid straining for bowel movements for at least 2 months after surgery.  Okay to lift up to 20 pounds at this point in time but " would wait a full 3 months for heavy lifting.  4.  Colonoscopy in 2026.    30 minutes spent on the date of encounter (excluding time performing procedures with or without biopsy) performing chart review, history and exam, documentation and further activities as noted above.    Uriah Sheridan M.D., M.Sc.     Division of Colon and Rectal Surgery  [facility]    Referring Provider:  Sabrina uRtledge, MITZY CNP  [address]  [place] [address]      Primary Care Provider:  Maryana Brooks     CC:  Nicole Rivera MD.

## 2022-03-21 ENCOUNTER — OFFICE VISIT (OUTPATIENT)
Dept: SURGERY | Facility: CLINIC | Age: 57
End: 2022-03-21
Payer: MEDICARE

## 2022-03-21 VITALS
DIASTOLIC BLOOD PRESSURE: 70 MMHG | BODY MASS INDEX: 19.34 KG/M2 | WEIGHT: 113.3 LBS | HEART RATE: 85 BPM | HEIGHT: 64 IN | OXYGEN SATURATION: 100 % | SYSTOLIC BLOOD PRESSURE: 131 MMHG

## 2022-03-21 DIAGNOSIS — Z09 FOLLOW-UP EXAMINATION AFTER COLORECTAL SURGERY: Primary | ICD-10-CM

## 2022-03-21 PROCEDURE — 99024 POSTOP FOLLOW-UP VISIT: CPT | Performed by: COLON & RECTAL SURGERY

## 2022-03-21 ASSESSMENT — PAIN SCALES - GENERAL: PAINLEVEL: NO PAIN (0)

## 2022-03-21 NOTE — LETTER
"3/21/2022       RE: Lynn Thompson  [id number] [id number]     Dear Colleague,    Thank you for referring your patient, Lynn Thompson, to the [facility] [place] [facility]. Please see a copy of my visit note below.    Colon and Rectal Surgery Follow-up Clinic Note      RE: Lynn Thompson  : 1965  FAUSTO: 3/21/2022    DIAGNOSIS: 57 year old female with history of total pancreatectomy with islet autotransplant in , splenectomy, choledochoduodenostomy, gastric bypass, appendectomy, ileal resection in  for Crohn's disease, hernia repair, lysis of adhesions x2, and a small bowel obstruction in . Crohn's has been in remission for over 20 years and she has not needed to be on any medications for this.  She has a history of constipation and developed full-thickness rectal prolapse. She is now status post posterior sutured rectopexy, lysis of adhesions, and flexible sigmoidoscopy with me and supracervical hysterectomy with bilateral salpingo-oophorectomy and Gan procedure with Dr. Rivera on 2022.    INTERVAL HISTORY: Denies increased pain, fevers, or chills. Tolerating diet well. Having 2-3 mushy to formed BM's per day with improving urgency but no incontinence.  Denies anal protrusion.  Off narcotic pain meds.    Physical Examination:  /70 (BP Location: Left arm, Patient Position: Sitting, Cuff Size: Adult Regular)   Pulse 85   Ht 5' 4\"   Wt 113 lb 4.8 oz   LMP 2013   SpO2 100%   BMI 19.45 kg/m    Abdomen soft, nontender. Incisions with no evidence of infection or hernia.    ASSESSMENT  Doing well postop.    Final Diagnosis    Uterus, bilateral fallopian tubes, and ovaries, supracervical hysterectomy with bilateral salpingo-oophorectomy:  -Inactive endometrium  -Adenomyosis  -Bilateral fallopian tubes, no significant histologic abnormalities  -Bilateral ovaries with " atrophic changes  -Negative for malignancy       PLAN  1.  High-fiber diet. Continue Citrucel or fiber supplement twice daily.  2.  MiraLAX daily as needed for constipation.  3.  No activity restrictions.  Avoid straining for bowel movements for at least 2 months after surgery.  Okay to lift up to 20 pounds at this point in time but would wait a full 3 months for heavy lifting.  4.  Colonoscopy in 2026.    30 minutes spent on the date of encounter (excluding time performing procedures with or without biopsy) performing chart review, history and exam, documentation and further activities as noted above.    Uriah Sheridan M.D., M.Sc.     Division of Colon and Rectal Surgery  [facility]    Referring Provider:  MITZY Dobbins CNP  [address]  [place] [address]      Primary Care Provider:  Maryana Brooks     CC:  Nicole Rivera MD.

## 2022-03-21 NOTE — NURSING NOTE
"Chief Complaint   Patient presents with     RECHECK     Post op, DOS: 2/1/22       Vitals:    03/21/22 1106   BP: 131/70   BP Location: Left arm   Patient Position: Sitting   Cuff Size: Adult Regular   Pulse: 85   SpO2: 100%   Weight: 51.4 kg (113 lb 4.8 oz)   Height: 1.626 m (5' 4\")       Body mass index is 19.45 kg/m .                          Sharee Donnelly, EMT    "

## 2022-04-16 ENCOUNTER — HOSPITAL ENCOUNTER (EMERGENCY)
Facility: CLINIC | Age: 57
Discharge: HOME | End: 2022-04-17
Attending: EMERGENCY MEDICINE
Payer: COMMERCIAL

## 2022-04-16 DIAGNOSIS — R50.9 FEVER, UNSPECIFIED FEVER CAUSE: ICD-10-CM

## 2022-04-16 DIAGNOSIS — R19.7 DIARRHEA OF PRESUMED INFECTIOUS ORIGIN: ICD-10-CM

## 2022-04-16 LAB
ALBUMIN SERPL-MCNC: 3.2 G/DL (ref 3.4–5)
ALP SERPL-CCNC: 132 U/L (ref 40–150)
ALT SERPL W P-5'-P-CCNC: 68 U/L (ref 0–50)
ANION GAP SERPL CALCULATED.3IONS-SCNC: 9 MMOL/L (ref 3–14)
AST SERPL W P-5'-P-CCNC: 99 U/L (ref 0–45)
BILIRUB DIRECT SERPL-MCNC: <0.1 MG/DL (ref 0–0.2)
BILIRUB SERPL-MCNC: 0.2 MG/DL (ref 0.2–1.3)
BUN SERPL-MCNC: 28 MG/DL (ref 7–30)
CALCIUM SERPL-MCNC: 8.5 MG/DL (ref 8.5–10.1)
CHLORIDE BLD-SCNC: 107 MMOL/L (ref 94–109)
CO2 SERPL-SCNC: 22 MMOL/L (ref 20–32)
CREAT SERPL-MCNC: 0.7 MG/DL (ref 0.52–1.04)
ERYTHROCYTE [DISTWIDTH] IN BLOOD BY AUTOMATED COUNT: 14.1 % (ref 10–15)
GFR SERPL CREATININE-BSD FRML MDRD: >90 ML/MIN/1.73M2
GLUCOSE BLD-MCNC: 241 MG/DL (ref 70–99)
HCT VFR BLD AUTO: 37 % (ref 35–47)
HGB BLD-MCNC: 11.6 G/DL (ref 11.7–15.7)
LACTATE SERPL-SCNC: 1.1 MMOL/L (ref 0.7–2)
MCH RBC QN AUTO: 30.1 PG (ref 26.5–33)
MCHC RBC AUTO-ENTMCNC: 31.4 G/DL (ref 31.5–36.5)
MCV RBC AUTO: 96 FL (ref 78–100)
PLATELET # BLD AUTO: 379 10E3/UL (ref 150–450)
POTASSIUM BLD-SCNC: 3.5 MMOL/L (ref 3.4–5.3)
PROT SERPL-MCNC: 6.5 G/DL (ref 6.8–8.8)
RBC # BLD AUTO: 3.85 10E6/UL (ref 3.8–5.2)
SODIUM SERPL-SCNC: 138 MMOL/L (ref 133–144)
WBC # BLD AUTO: 10.8 10E3/UL (ref 4–11)

## 2022-04-16 PROCEDURE — 82248 BILIRUBIN DIRECT: CPT | Performed by: EMERGENCY MEDICINE

## 2022-04-16 PROCEDURE — 81001 URINALYSIS AUTO W/SCOPE: CPT | Performed by: EMERGENCY MEDICINE

## 2022-04-16 PROCEDURE — 36415 COLL VENOUS BLD VENIPUNCTURE: CPT | Performed by: EMERGENCY MEDICINE

## 2022-04-16 PROCEDURE — 96374 THER/PROPH/DIAG INJ IV PUSH: CPT

## 2022-04-16 PROCEDURE — 258N000003 HC RX IP 258 OP 636: Performed by: EMERGENCY MEDICINE

## 2022-04-16 PROCEDURE — 83605 ASSAY OF LACTIC ACID: CPT | Performed by: EMERGENCY MEDICINE

## 2022-04-16 PROCEDURE — 85027 COMPLETE CBC AUTOMATED: CPT | Performed by: EMERGENCY MEDICINE

## 2022-04-16 PROCEDURE — 99284 EMERGENCY DEPT VISIT MOD MDM: CPT | Mod: 25

## 2022-04-16 PROCEDURE — 96361 HYDRATE IV INFUSION ADD-ON: CPT

## 2022-04-16 PROCEDURE — 250N000011 HC RX IP 250 OP 636: Performed by: EMERGENCY MEDICINE

## 2022-04-16 PROCEDURE — C9803 HOPD COVID-19 SPEC COLLECT: HCPCS

## 2022-04-16 PROCEDURE — 80053 COMPREHEN METABOLIC PANEL: CPT | Performed by: EMERGENCY MEDICINE

## 2022-04-16 PROCEDURE — 99285 EMERGENCY DEPT VISIT HI MDM: CPT | Mod: 25

## 2022-04-16 PROCEDURE — 87040 BLOOD CULTURE FOR BACTERIA: CPT | Performed by: EMERGENCY MEDICINE

## 2022-04-16 RX ORDER — ACETAMINOPHEN 500 MG
1000 TABLET ORAL ONCE
Status: COMPLETED | OUTPATIENT
Start: 2022-04-16 | End: 2022-04-17

## 2022-04-16 RX ORDER — KETOROLAC TROMETHAMINE 30 MG/ML
INJECTION, SOLUTION INTRAMUSCULAR; INTRAVENOUS
Status: DISCONTINUED
Start: 2022-04-16 | End: 2022-04-17 | Stop reason: HOSPADM

## 2022-04-16 RX ORDER — ONDANSETRON 2 MG/ML
4 INJECTION INTRAMUSCULAR; INTRAVENOUS
Status: COMPLETED | OUTPATIENT
Start: 2022-04-16 | End: 2022-04-16

## 2022-04-16 RX ADMIN — ONDANSETRON 4 MG: 2 INJECTION INTRAMUSCULAR; INTRAVENOUS at 23:10

## 2022-04-16 RX ADMIN — SODIUM CHLORIDE 1000 ML: 9 INJECTION, SOLUTION INTRAVENOUS at 23:10

## 2022-04-16 ASSESSMENT — ENCOUNTER SYMPTOMS
HEADACHES: 1
FEVER: 1
COUGH: 0
VOMITING: 0
DIARRHEA: 1
NECK PAIN: 1
NECK STIFFNESS: 1
DIFFICULTY URINATING: 0
FREQUENCY: 0
MYALGIAS: 1
DYSURIA: 0

## 2022-04-17 ENCOUNTER — LAB (OUTPATIENT)
Dept: LAB | Facility: CLINIC | Age: 57
End: 2022-04-17
Payer: COMMERCIAL

## 2022-04-17 ENCOUNTER — TELEPHONE (OUTPATIENT)
Dept: EMERGENCY MEDICINE | Facility: CLINIC | Age: 57
End: 2022-04-17

## 2022-04-17 VITALS
HEART RATE: 69 BPM | RESPIRATION RATE: 16 BRPM | DIASTOLIC BLOOD PRESSURE: 59 MMHG | SYSTOLIC BLOOD PRESSURE: 113 MMHG | TEMPERATURE: 98.7 F | OXYGEN SATURATION: 95 %

## 2022-04-17 DIAGNOSIS — R50.9 FEVER, UNSPECIFIED FEVER CAUSE: ICD-10-CM

## 2022-04-17 DIAGNOSIS — A04.72 COLITIS DUE TO CLOSTRIDIUM DIFFICILE: ICD-10-CM

## 2022-04-17 LAB
ALBUMIN UR-MCNC: NEGATIVE MG/DL
APPEARANCE UR: CLEAR
BILIRUB UR QL STRIP: NEGATIVE
C DIFF TOX B STL QL: POSITIVE
COLOR UR AUTO: ABNORMAL
FLUAV RNA SPEC QL NAA+PROBE: NEGATIVE
FLUBV RNA RESP QL NAA+PROBE: NEGATIVE
GLUCOSE UR STRIP-MCNC: 100 MG/DL
HGB UR QL STRIP: ABNORMAL
HOLD SPECIMEN: NORMAL
HYALINE CASTS: 1 /LPF
KETONES UR STRIP-MCNC: NEGATIVE MG/DL
LEUKOCYTE ESTERASE UR QL STRIP: NEGATIVE
NITRATE UR QL: NEGATIVE
PH UR STRIP: 5.5 [PH] (ref 5–7)
RBC URINE: <1 /HPF
RSV RNA SPEC NAA+PROBE: NEGATIVE
SARS-COV-2 RNA RESP QL NAA+PROBE: NEGATIVE
SP GR UR STRIP: 1.01 (ref 1–1.03)
SQUAMOUS EPITHELIAL: <1 /HPF
UROBILINOGEN UR STRIP-MCNC: NORMAL MG/DL
WBC URINE: 1 /HPF

## 2022-04-17 PROCEDURE — 250N000013 HC RX MED GY IP 250 OP 250 PS 637: Performed by: EMERGENCY MEDICINE

## 2022-04-17 PROCEDURE — 87637 SARSCOV2&INF A&B&RSV AMP PRB: CPT | Performed by: EMERGENCY MEDICINE

## 2022-04-17 PROCEDURE — 87493 C DIFF AMPLIFIED PROBE: CPT

## 2022-04-17 RX ORDER — VANCOMYCIN HYDROCHLORIDE 125 MG/1
125 CAPSULE ORAL 4 TIMES DAILY
Qty: 40 CAPSULE | Refills: 0 | Status: SHIPPED | OUTPATIENT
Start: 2022-04-17 | End: 2022-04-27

## 2022-04-17 RX ADMIN — ACETAMINOPHEN 1000 MG: 500 TABLET, FILM COATED ORAL at 00:02

## 2022-04-17 NOTE — TELEPHONE ENCOUNTER
[facility] Emergency Department Lab result notification [Adult-Female]    [facility] ED lab result protocol used  Clostridium difficile    Reason for call  Notify of lab results, assess symptoms,  review ED providers recommendations/discharge instructions (if necessary) and advise per ED lab result f/u protocol    Lab Result (including Rx patient on, if applicable)  Final Clostridium difficile toxin B PCR is POSITIVE.    [facility] Emergency Dept discharge antibiotic: None  Recommendations in treatment per [facility] ED Lab result Clostridium difficile protocol.     Information table from Emergency Dept Provider visit on 4/16-4/17/22  Symptoms reported at ED visit (Chief complaint, HPI) Fever and Diarrhea     The history is provided by the patient.      Lynn Thompson is a 57 year old female with history of hypertension and pancreatic islet cell transplant who presents with fever and diarrhea. Patient complains of a fever which started today. High fever of 101.9F. She also reports diarrhea over the past few days. She has history of rectal prolapse repair. She has been going through 2-3 diapers during the night. She complains of body aches, headache, and a sore neck. She is able to move her neck okay. 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 She took ibuprofen at 830 tonight. No recent Tylenol. She is vaccinated against COVID and influenza. Denies cough or vomiting. No problems urinating.    Significant Medical hx, if applicable (i.e. CKD, diabetes) Reviewed   Allergies Allergies   Allergen Reactions     Corticosteroids Other (See Comments)     All oral, IV and injectable steroids are contraindicated (unless in life threatening situations) in Islet Auto transplant recipients. They can cause irreversible loss of islet cell function. Please contact patient's transplant care coordinator, Erlinda Multani RN BSN at [phone]/pager: [phone] and  "endocrinologist prior to administration.       Povidone Iodine Hives     Causes skin to blister     Nsaids      naprosyn = GI upset     Povidone Iodine      blisters     Sulfasalazine Nausea and Nausea and Vomiting      Weight, if applicable Wt Readings from Last 2 Encounters:   03/21/22 51.4 kg (113 lb 4.8 oz)   02/24/22 49.9 kg (110 lb)      Coumadin/Warfarin [Yes /No] No   Creatinine Level (mg/dl) Creatinine   Date Value Ref Range Status   04/16/2022 0.70 0.52 - 1.04 mg/dL Final   05/10/2021 0.81 0.52 - 1.04 mg/dL Final      Creatinine clearance (ml/min), if applicable Serum creatinine: 0.7 mg/dL 04/16/22 2302  Estimated creatinine clearance: 70.2 mL/min   Pregnant (Yes/No/NA) na   Breastfeeding (Yes/No/NA) na   ED providers Impression and Plan (applicable information) Ms. Thompson is a 57 year old female who presents with a fever and diarrhea. She has a history of pancreatic islet cell transplant but is otherwise hemodynamically stable. Blood cultures were sent. After fluids she feels better. She has not able to provide a stool sample here as she just took imodium prior to coming in. Will send her home with stool collection kits. Viral testing for COVID and influenza are also pending. She has no respiratory symptoms making pneumonia unlikely. Not concerned clinically for meningitis or encephalitis. With reassuring labs she will be discharged home with primary care follow up pending collection of stool and follow up of viral cultures.    ED diagnosis 1. Fever R50.9         2. Diarrhea of presumed infectious origin        ED provider Mo Layton MD      RN Assessment (Patient s current Symptoms), include time called.  [Insert Left message here if message left]  11:59AM: Spoke with patient. She states she is \"sore\" and \"run down\". Has had 3 diarrhea stools today, no blood noted. Taking in fluids well, urine output is good. Taking imodium. Denies any back pain.     RN Recommendations/Instructions per [place] ED lab " result protocol  Patient notified of lab result and treatment recommendations.  Rx for Vancomycin (VANCOCIN HCL) 125 MG capsule, 1 capsule (125 mg) by mouth 4 times daily for 10 days sent to [Pharmacy - [facility] in [facility]].   RN reviewed information about Clostridium difficile from protocol.   Patient states that this is her first time with clostridium difficile   Advised to increase fluids.  Return to ED with any worsening symptoms: worse diarrhea, worse abdominal pain, no urine in 8 hours, very weak/tired, pale skin.   Advised to wash hands well and often with soap and water.   Clean commonly touched surfaces with a chlorine bleach product. Wash clothes, sheets, bedding, towels separately with hot water, detergent and chlorine bleach.   Advised to not take any anti-diarrheals such as Imodium.     Advised to follow up with the PCP as directed by the ED provider.  The patient is comfortable with the information given and has no further questions.     Please Contact your PCP clinic or return to the Emergency department if your:    Symptoms do not improve after 3 days on antibiotic.    Symptoms worsen or other concerning symptom's.    PCP follow-up Questions asked: YES       Allison Lundberg RN  [facility]  Emergency Dept Lab Result RN  Ph# [phone]     Copy of Lab result   Clostridium difficile toxin B PCR  Order: [id number]   Status: Final result     Visible to patient: Yes (not seen)     Dx: Fever, unspecified fever cause    Specimen Information: Per Rectum; Stool         1 Result Note     Component Ref Range & Units  2:00 AM     C Difficile Toxin B by PCR Negative Positive Abnormal     Comment: Detection of C. difficile nucleic acid in stools confirms the presence of these organisms in diarrheal patients but may not indicate that C. difficile are the etiologic agents of the diarrhea. Results from the Xpert C. difficile assay should be interpreted in conjunction with  other laboratory and clinical data available to the clinician.   Resulting Agency  [facility]             Narrative  Performed by: [facility]  The [employer] Xpert C. difficile Assay, performed on the [employer]  Instrument Systems, is a qualitative in vitro diagnostic test for rapid detection of toxin B gene sequences from unformed (liquid or soft) stool specimens collected from patients suspected of having Clostridioides difficile infection (CDI). The test utilizes automated real-time polymerase chain reaction (PCR) to detect toxin gene sequences associated with toxin producing C. difficile. The Xpert C. difficile Assay is intended as an aid in the diagnosis of CDI.      Specimen Collected: 04/17/22  2:00 AM Last Resulted: 04/17/22 11:34 AM

## 2022-04-17 NOTE — ED TRIAGE NOTES
Fever, body aches and diarrhea x 2 nights.  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  Pt is an islet cell transplant pt. Took 800 mg ibuprofen 1-2 hrs pta.

## 2022-04-17 NOTE — ED PROVIDER NOTES
History     Chief Complaint:  Fever and Diarrhea     The history is provided by the patient.      Lynn Thompson is a 57 year old female with history of hypertension and pancreatic islet cell transplant who presents with fever and diarrhea. Patient complains of a fever which started today. High fever of 101.9F. She also reports diarrhea over the past few days. She has history of rectal prolapse repair. She has been going through 2-3 diapers during the night. She complains of body aches, headache, and a sore neck. She is able to move her neck okay. 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 She took ibuprofen at 830 tonight. No recent Tylenol. She is vaccinated against COVID and influenza. Denies cough or vomiting. No problems urinating.     Review of Systems   Constitutional: Positive for fever.   Respiratory: Negative for cough.    Gastrointestinal: Positive for diarrhea. Negative for vomiting.   Genitourinary: Negative for difficulty urinating, dysuria and frequency.   Musculoskeletal: Positive for myalgias, neck pain and neck stiffness.   Neurological: Positive for headaches.   All other systems reviewed and are negative.    Allergies:  Corticosteroids  Povidone iodine  Nsaids  Sulfasalazine     Medications:  Viokace   Caltrate  Cymbalta   Lexapro   Pepcid  Neurontin   Atarax   Synthroid   Claritin   Robaxin   Provigil   Prilosec   Zofran   Miralax  Desyrel     Past Medical History:     Benign paroxysmal positional vertigo   Chronic pancreatitis  Depression  Dyspepsia  Hypertension   Iron deficiency anemia   Post-pancreatectomy diabetes mellitus   Sleep apnea   Spasm of sphincter of oddi   Thyroid nodule   Hypothyroidism   Uterovaginal prolapse  Rectal prolapse   Small bowel obstruction    Kidney stones   PTSD     Past Surgical History:    Abdominoplasty   Appendectomy   Bunionectomy    section   Cholecystectomy   Colonoscopy   Combined cystoscopy, retrogrades,  ureteroscopy, laser holmium lithotripsy ureter, insert stent x2  Cystectomy ovarian benign   Cystoscopy, retrogrades, insert stent ureter, combined   EGD  Extracorporeal shock wave lithotripsy   Gastric bypass   Hernia repair   Hysterectomy supracervical, bilateral salpingo-oophorectomy, combined  Irrigation and debridement hand, combined  Laparoscopic lysis adhesions x2   Pancreatectomy, transplant auto islet cell, combined  Partial ileum resection  Rectopexy abdominal   Repair ptosis brow bilateral   Sacrocolpopexy, cystoscopy, combined   Sigmoidoscopy flexible   Tonsillectomy, adenoidectomy, combined    Family History:    Father: COPD, emphysema, substance abuse, depression, asthma   Mother: crohn's disease   Brother: kidney stones   Son: anxiety   Daughter: anxiety, asthma     Social History:  Presents to ED with visitor     Physical Exam     Patient Vitals for the past 24 hrs:   BP Temp Temp src Pulse Resp SpO2   04/17/22 0035 -- -- -- -- -- 95 %   04/17/22 0030 113/59 -- -- 69 -- 96 %   04/17/22 0004 -- 98.7  F (37.1  C) Oral -- -- --   04/16/22 2226 140/61 99.9  F (37.7  C) Temporal 91 16 95 %     Physical Exam  Nursing note and vitals reviewed.  Constitutional: Cooperative. Tired appearing.   HENT:   Mouth/Throat: Mucous membranes are normal.   Cardiovascular: Normal rate, regular rhythm and normal heart sounds.  No murmur.  Pulmonary/Chest: Effort normal and breath sounds normal. No respiratory distress. No wheezes.  Abdominal: Soft. Normal appearance and bowel sounds are normal. No distension. There is no tenderness. There is no rigidity and no guarding.   Musculoskeletal: No neck rigidity.   Neurological: Alert. Oriented x4  Skin: Skin is warm and dry. No rash noted.   Psychiatric: Normal mood and affect.      Emergency Department Course     Laboratory:  Labs Ordered and Resulted from Time of ED Arrival to Time of ED Departure   CBC WITH PLATELETS - Abnormal       Result Value    WBC Count 10.8      RBC  Count 3.85      Hemoglobin 11.6 (*)     Hematocrit 37.0      MCV 96      MCH 30.1      MCHC 31.4 (*)     RDW 14.1      Platelet Count 379     BASIC METABOLIC PANEL - Abnormal    Sodium 138      Potassium 3.5      Chloride 107      Carbon Dioxide (CO2) 22      Anion Gap 9      Urea Nitrogen 28      Creatinine 0.70      Calcium 8.5      Glucose 241 (*)     GFR Estimate >90     ROUTINE UA WITH MICROSCOPIC - Abnormal    Color Urine Straw      Appearance Urine Clear      Glucose Urine 100  (*)     Bilirubin Urine Negative      Ketones Urine Negative      Specific Gravity Urine 1.011      Blood Urine Small (*)     pH Urine 5.5      Protein Albumin Urine Negative      Urobilinogen Urine Normal      Nitrite Urine Negative      Leukocyte Esterase Urine Negative      RBC Urine <1      WBC Urine 1      Squamous Epithelials Urine <1      Hyaline Casts Urine 1     HEPATIC FUNCTION PANEL - Abnormal    Bilirubin Total 0.2      Bilirubin Direct <0.1      Protein Total 6.5 (*)     Albumin 3.2 (*)     Alkaline Phosphatase 132      AST 99 (*)     ALT 68 (*)    LACTIC ACID WHOLE BLOOD - Normal    Lactic Acid 1.1     INFLUENZA A/B & SARS-COV2 PCR MULTIPLEX - pending   BLOOD CULTURE - pending   BLOOD CULTURE - pending       Reviewed:  I reviewed nursing notes, vitals, past medical history and Care Everywhere    Assessments:  2309 I obtained history and examined the patient as noted above.   0042 I rechecked the patient and explained findings. She is feeling better. She could not pass stool will send home with collection kit.     Interventions:  2310 Zofran 4 mg IV   2310 NS 1L IV   0002 Tylenol 1000 mg PO     Disposition:  The patient was discharged to home.     Impression & Plan     Medical Decision Making:  Ms. Thompson is a 57 year old female who presents with a fever and diarrhea. She has a history of pancreatic islet cell transplant but is otherwise hemodynamically stable. Blood cultures were sent. After fluids she feels better. She  has not able to provide a stool sample here as she just took imodium prior to coming in. Will send her home with stool collection kits. Viral testing for COVID and influenza are also pending. She has no respiratory symptoms making pneumonia unlikely. Not concerned clinically for meningitis or encephalitis. With reassuring labs she will be discharged home with primary care follow up pending collection of stool and follow up of viral cultures.     Diagnosis:    ICD-10-CM   1. Fever R50.9       2. Diarrhea of presumed infectious origin  R19.7     Covid-19  Lynn Thompson was evaluated during a global COVID-19 pandemic, which necessitated consideration that the patient might be at risk for infection with the SARS-CoV-2 virus that causes COVID-19.   Applicable protocols for evaluation were followed during the patient's care.   COVID-19 was considered as part of the patient's evaluation. The plan for testing is:  a test was obtained during this visit.     Scribe Disclosure:  SHANA, Jaya Fang, am serving as a scribe at 11:10 PM on 4/16/2022 to document services personally performed by Mo Layton MD based on my observations and the provider's statements to me.            Mo Layton MD  04/17/22 0059

## 2022-04-17 NOTE — DISCHARGE INSTRUCTIONS
Discharge Instructions  Fever    You have been seen today for a fever. Fever is a normal body reaction to illness or inflammation. Fever is a sign that your body is doing what it should to fight something off. Fever is not dangerous, but it can make you feel miserable, and you will probably feel better if you get your fever to go down. Most infections are caused by a virus, and antibiotics will not help; your provider will tell you whether antibiotics are needed in your case. At this time your provider does not find that your fever is a sign of anything dangerous or life-threatening.  However, sometimes the signs of serious illness do not show up right away so additional care may be necessary.    Generally, every Emergency Department visit should have a follow-up clinic visit with either a primary or a specialty clinic/provider. Please follow-up as instructed by your emergency provider today.    What can I do to help myself?  Fill any prescriptions the provider gave you and take them right away--especially antibiotics.  If you have a fever, get plenty of rest and drink lots of fluids, especially water.  What clothes or blankets you have on will not change your fever. Do what is comfortable for you.  Bathing or sponging in lukewarm water may help you feel better.  Tylenol  (acetaminophen), Motrin  (ibuprofen), or Advil  (ibuprofen) help bring fever down and may help you feel more comfortable. Be sure to read and follow the package directions, and ask your provider if you have questions.  Do not drink alcohol.    Return to the Emergency Department if:  Any of the symptoms you have get much worse.  You seem very sick, like being too weak to get up.  You have any new symptoms, especially serious things like abdominal (belly) pain or chest pain.  You are short of breath.  You have a severe headache.  You are vomiting (throwing up) so much you cannot keep fluids or medicines down.  You have confusion or seem unusually  drowsy.  You have a seizure.  You have anything else that worries you.  If you were given a prescription for medicine here today, be sure to read all of the information (including the package insert) that comes with your prescription.  This will include important information about the medicine, its side effects, and any warnings that you need to know about.  The pharmacist who fills the prescription can provide more information and answer questions you may have about the medicine.  If you have questions or concerns that the pharmacist cannot address, please call or return to the Emergency Department.   Remember that you can always come back to the Emergency Department if you are not able to see your regular provider in the amount of time listed above, if you get any new symptoms, or if there is anything that worries you.    ~~~~~~~~~~~~~~      Discharge Instructions  Adult Diarrhea    You have been seen today for diarrhea (loose stools). This is usually caused by a virus, but some bacteria, parasites, medicines, or other medical conditions can cause similar symptoms. At this time your provider does not find that your diarrhea is a sign of anything dangerous or life-threatening. However, sometimes the signs of serious illness do not show up right away. If you have new or worse symptoms, you may need to be seen again in the Emergency Department or by your primary provider.     Generally, every Emergency Department visit should have a follow-up clinic visit with either a primary or a specialty clinic/provider. Please follow-up as instructed by your emergency provider today.    Return to the Emergency Department if:  You feel you are getting dehydrated, such as being very thirsty, not urinating (peeing) like usual, or feeling faint or lightheaded.   You develop a new fever.  You have abdominal (belly) pain that seems worse than cramps, is in one spot, or is getting worse over time.   You have blood in your stool or your  "stool becomes black.  (Remember that if you take Pepto-Bismol , this will turn your stool black).   You feel very weak.    What can I do to help myself?  The most important thing to do is to drink clear liquids.   It is best to have only small, frequent sips of liquids. Drinking too much at once may cause more diarrhea. You should also replace minerals, sodium and potassium lost with diarrhea. Pedialyte  and sports drinks can help you replace these minerals. You can also drink clear liquids such as water, weak tea, apple juice, and 7-Up . Avoid acidic liquids (orange juice), caffeine (coffee) or alcohol. Milk products will make the diarrhea worse.  Eat bland (plain) foods. Soda crackers, toast, plain noodles, gelatin, applesauce and bananas are good first choices. Avoid foods that have acid, are spicy, fatty or fibrous (such as meats, coarse grains, vegetables). You may start eating these foods again in about 3 days when you are better.   Sometimes treatment includes prescription medicine to prevent diarrhea. If your provider prescribes these for you, take them as directed.   Nonprescription medicine is available for the treatment of diarrhea and can be very effective. If you use it, make sure you use the dose recommended on the package. Check with your healthcare provider before you use any medicine for diarrhea.   Do not take ibuprofen, or other nonsteroidal anti-inflammatory medicines, without checking with your healthcare provider.   Probiotics: If you have been given an antibiotic, you may want to also take a probiotic pill or eat yogurt with live cultures. Probiotics have \"good bacteria\" to help your intestines stay healthy. Studies have shown that probiotics help prevent diarrhea and other intestine problems (including C. diff infection) when you take antibiotics. You can buy these without a prescription in the pharmacy section of the store.   If you were given a prescription for medicine here today, be sure " to read all of the information (including the package insert) that comes with your prescription.  This will include important information about the medicine, its side effects, and any warnings that you need to know about.  The pharmacist who fills the prescription can provide more information and answer questions you may have about the medicine.  If you have questions or concerns that the pharmacist cannot address, please call or return to the Emergency Department.  Remember that you can always come back to the Emergency Department if you are not able to see your regular provider in the amount of time listed above, if you get any new symptoms, or if there is anything that worries you.

## 2022-04-18 ENCOUNTER — TELEPHONE (OUTPATIENT)
Dept: SURGERY | Facility: CLINIC | Age: 57
End: 2022-04-18
Payer: COMMERCIAL

## 2022-04-18 NOTE — TELEPHONE ENCOUNTER
She has a history of constipation and developed full-thickness rectal prolapse. She is now status post posterior sutured rectopexy, lysis of adhesions, and flexible sigmoidoscopy with  and supracervical hysterectomy with bilateral salpingo-oophorectomy and Gan procedure with Dr. Rivera on 2/1/2022. She was recently diagnosed with C diff and put on vanco. She wanted to check in with us and make sure this was the correct treatment. I reassured her and told her to reach out if she continues to have fevers, diarrhea or abd pain post vanco. Updated  and Abby COVARRUBIAS.

## 2022-04-18 NOTE — TELEPHONE ENCOUNTER
[facility] Call Center    Phone Message    May a detailed message be left on voicemail: yes     Reason for Call: Other:       Lynn is calling in asking to speak with someone in Dr Sheridan's team.  She was just yesterday diagnosed with cdiff.         Action Taken: Message routed to:  [facility] CRS    Travel Screening: Not Applicable

## 2022-04-19 ENCOUNTER — TELEPHONE (OUTPATIENT)
Dept: FAMILY MEDICINE | Facility: CLINIC | Age: 57
End: 2022-04-19

## 2022-04-19 DIAGNOSIS — B37.31 YEAST INFECTION OF THE VAGINA: Primary | ICD-10-CM

## 2022-04-19 RX ORDER — FLUCONAZOLE 150 MG/1
150 TABLET ORAL
Qty: 2 TABLET | Refills: 0 | Status: SHIPPED | OUTPATIENT
Start: 2022-04-19 | End: 2022-04-21

## 2022-04-19 NOTE — PROGRESS NOTES
Pre-Visit Planning     Appointment Notes for this encounter:   ER follow up    Questionnaires Reviewed/Assigned  No additional questionnaires are needed  Patient preferred phone number: [phone]    E-Check in complete

## 2022-04-19 NOTE — TELEPHONE ENCOUNTER
Virtual video visit appropriate? If not please advise on your schedule as she does not want to see anyone else.  Lilia Goff,

## 2022-04-19 NOTE — TELEPHONE ENCOUNTER
Pt transferred to RN, pt is currently taking antibiotic for c diff:    vancomycin (VANCOCIN) 125 MG capsule 40 capsule 0 4/17/2022 4/27/2022 --   Sig - Route: Take 1 capsule (125 mg) by mouth 4 times daily for 10      Pt states she has a hx of getting yeast infection while on antibiotics, requesting medication to be sent to pharmacy pended. Please review and advise, pt scheduled with PCP for hospital follow up on 4/21/22.     Andrea ARROYO RN

## 2022-04-19 NOTE — TELEPHONE ENCOUNTER
Reason for call:  Other   Patient called regarding (reason for call): appointment  Additional comments: Pt needs ED Follow Up visit within next 2-3 days. Was seen at [facility] ED 4/16, C-diff.  Pt declined to be seen by provider other than PCP    Phone number to reach patient:  Home number on file [phone] (home)    Best Time:  any    Can we leave a detailed message on this number?  YES    Travel screening: Negative

## 2022-04-19 NOTE — TELEPHONE ENCOUNTER
Scheduled for Thursday. Transferred call to triage as she has questions about antibiotic and possible need for yeast treatment.  Lilia Goff,

## 2022-04-20 ASSESSMENT — PATIENT HEALTH QUESTIONNAIRE - PHQ9
10. IF YOU CHECKED OFF ANY PROBLEMS, HOW DIFFICULT HAVE THESE PROBLEMS MADE IT FOR YOU TO DO YOUR WORK, TAKE CARE OF THINGS AT HOME, OR GET ALONG WITH OTHER PEOPLE: NOT DIFFICULT AT ALL
SUM OF ALL RESPONSES TO PHQ QUESTIONS 1-9: 0
SUM OF ALL RESPONSES TO PHQ QUESTIONS 1-9: 0

## 2022-04-21 ENCOUNTER — VIRTUAL VISIT (OUTPATIENT)
Dept: FAMILY MEDICINE | Facility: CLINIC | Age: 57
End: 2022-04-21
Payer: COMMERCIAL

## 2022-04-21 DIAGNOSIS — A04.72 C. DIFFICILE COLITIS: Primary | ICD-10-CM

## 2022-04-21 PROCEDURE — 99213 OFFICE O/P EST LOW 20 MIN: CPT | Mod: 95 | Performed by: FAMILY MEDICINE

## 2022-04-21 ASSESSMENT — PATIENT HEALTH QUESTIONNAIRE - PHQ9: SUM OF ALL RESPONSES TO PHQ QUESTIONS 1-9: 0

## 2022-04-21 NOTE — PROGRESS NOTES
Lynn is a 57 year old who is being evaluated via a billable video visit.      How would you like to obtain your AVS? MyChart  If the video visit is dropped, the invitation should be resent by: Text to cell phone: [phone]  Will anyone else be joining your video visit? No       Video Start Time: 10:57 AM    Assessment & Plan     C. difficile colitis - symptoms improving with abx, no need for test of cure. Call with questions.       Return in about 4 months (around 8/21/2022) for Yearly Preventive Exam.    Maryana Brooks MD  [facility]   Lynn is a 57 year old who presents for the following health issues     HPI     In ER 5 days ago with fever and diarrhea, diagnosed with c diff, treated with vancomycin. On day 4 of abx.     Still adhering to a bland diet, will have loose stools following eating most of the time. Overall feeling better.     Recent colorectal surgery, has been in contact with her surgeon already.       Review of Systems   Constitutional, HEENT, cardiovascular, pulmonary, gi and gu systems are negative, except as otherwise noted.      Objective       Vitals:  No vitals were obtained today due to virtual visit.    Physical Exam   GENERAL: Healthy, alert and no distress  EYES: Eyes grossly normal to inspection.  No discharge or erythema, or obvious scleral/conjunctival abnormalities.  RESP: No audible wheeze, cough, or visible cyanosis.  No visible retractions or increased work of breathing.    SKIN: Visible skin clear. No significant rash, abnormal pigmentation or lesions.  NEURO: Cranial nerves grossly intact.  Mentation and speech appropriate for age.  PSYCH: Mentation appears normal, affect normal/bright, judgement and insight intact, normal speech and appearance well-groomed.            Video-Visit Details    Type of service:  Video Visit    Video End Time:11:15 AM    Originating Location (pt. Location): Home    Distant Location (provider location):    [facility]     Platform used for Video Visit: Lizbeth

## 2022-04-22 LAB
BACTERIA BLD CULT: NO GROWTH
BACTERIA BLD CULT: NO GROWTH

## 2022-05-08 ENCOUNTER — OFFICE VISIT (OUTPATIENT)
Dept: URGENT CARE | Facility: URGENT CARE | Age: 57
End: 2022-05-08
Payer: COMMERCIAL

## 2022-05-08 VITALS
DIASTOLIC BLOOD PRESSURE: 66 MMHG | RESPIRATION RATE: 16 BRPM | BODY MASS INDEX: 18.44 KG/M2 | HEART RATE: 81 BPM | SYSTOLIC BLOOD PRESSURE: 114 MMHG | WEIGHT: 108 LBS | OXYGEN SATURATION: 97 % | TEMPERATURE: 99.1 F | HEIGHT: 64 IN

## 2022-05-08 DIAGNOSIS — R30.0 DYSURIA: Primary | ICD-10-CM

## 2022-05-08 DIAGNOSIS — R79.9 ABNORMAL FINDING OF BLOOD CHEMISTRY, UNSPECIFIED: ICD-10-CM

## 2022-05-08 DIAGNOSIS — R81 GLUCOSURIA: ICD-10-CM

## 2022-05-08 LAB
ALBUMIN UR-MCNC: NEGATIVE MG/DL
APPEARANCE UR: CLEAR
BILIRUB UR QL STRIP: NEGATIVE
COLOR UR AUTO: YELLOW
GLUCOSE UR STRIP-MCNC: >=1000 MG/DL
HBA1C MFR BLD: 8.2 % (ref 0–5.6)
HGB UR QL STRIP: NEGATIVE
KETONES UR STRIP-MCNC: NEGATIVE MG/DL
LEUKOCYTE ESTERASE UR QL STRIP: NEGATIVE
NITRATE UR QL: NEGATIVE
PH UR STRIP: 6 [PH] (ref 5–7)
SP GR UR STRIP: 1.01 (ref 1–1.03)
UROBILINOGEN UR STRIP-ACNC: 0.2 E.U./DL

## 2022-05-08 PROCEDURE — 99214 OFFICE O/P EST MOD 30 MIN: CPT | Performed by: FAMILY MEDICINE

## 2022-05-08 PROCEDURE — 83036 HEMOGLOBIN GLYCOSYLATED A1C: CPT | Performed by: FAMILY MEDICINE

## 2022-05-08 PROCEDURE — 81003 URINALYSIS AUTO W/O SCOPE: CPT

## 2022-05-08 PROCEDURE — 36415 COLL VENOUS BLD VENIPUNCTURE: CPT | Performed by: FAMILY MEDICINE

## 2022-05-08 RX ORDER — SULFAMETHOXAZOLE/TRIMETHOPRIM 800-160 MG
1 TABLET ORAL 2 TIMES DAILY
Qty: 6 TABLET | Refills: 0 | Status: SHIPPED | OUTPATIENT
Start: 2022-05-08 | End: 2022-05-11

## 2022-05-08 ASSESSMENT — ENCOUNTER SYMPTOMS
DIZZINESS: 0
DYSURIA: 1
FEVER: 0
DIFFICULTY URINATING: 0
ABDOMINAL PAIN: 0
FREQUENCY: 1
POLYPHAGIA: 1
HEADACHES: 0
POLYDIPSIA: 1
CONFUSION: 0

## 2022-05-08 NOTE — PROGRESS NOTES
"  Assessment & Plan     Dysuria  Possibly due to urinary tract infection, unable to rule out as she has been taking Azo which can cause UAs to be falsely negative.  Recommend empiric treatment, historical urine cultures reviewed, previous E. coli with pan sensitivity.  Recommend starting Bactrim.  Exam findings not suspicious for pyelonephritis  - UA Macro with Reflex to Micro and Culture - lab collect  - UA Macro with Reflex to Micro and Culture - lab collect  - sulfamethoxazole-trimethoprim (BACTRIM DS) 800-160 MG tablet  Dispense: 6 tablet; Refill: 0    Glucosuria  Severe glucosuria greater than 1000.  Discussed patient she is at risk for dehydration due to osmotic diuresis, recommend continued p.o. intake.  If having any dizziness, confusion, weakness, needs to go to the ER.  - Hemoglobin A1c  - Hemoglobin A1c    Abnormal finding of blood chemistry, unspecified   Patient has post pancreatectomy diabetes mellitus, A1c uncontrolled at 8.2, recommend increasing Lantus from 5 units to 7 units, sounds like she has had some hypoglycemic events in the past, recommend gentle titration.  Otherwise, advised her to contact her primary care office in the morning for continued management  - Hemoglobin A1c  - Hemoglobin A1c        Return in about 3 days (around 5/11/2022) for If symptoms do not improve or gets worse..    Saúl Castaneda MD  [facility]   Lynn is a 57 year old who presents for the following health issues     HPI     Patient is a 57-year-old female with history of post pancreectomy diabetes mellitus, status post auto islet cell transplant therapy, who presents for concerns of urinary tract infection.  4-day symptoms of frequency, urgency, burning with urination.  Took Azo.  C. difficile 3 weeks ago, just completed Vanco.  No abdominal pain.     Glucometer has been reading \"high\", no dizziness, palpitations.  Had extra sweets today as it was Mother's " "Day.        Review of Systems   Constitutional: Negative for fever.   Gastrointestinal: Negative for abdominal pain.   Endocrine: Positive for polydipsia, polyphagia and polyuria.   Genitourinary: Positive for dysuria and frequency. Negative for difficulty urinating.   Neurological: Negative for dizziness and headaches.   Psychiatric/Behavioral: Negative for confusion.            Objective    /66 (BP Location: Right arm, Patient Position: Chair, Cuff Size: Adult Regular)   Pulse 81   Temp 99.1  F (37.3  C) (Oral)   Resp 16   Ht 1.626 m (5' 4\")   Wt 49 kg (108 lb)   LMP 12/19/2013   SpO2 97%   Breastfeeding No   BMI 18.54 kg/m    Body mass index is 18.54 kg/m .  Physical Exam  Vitals reviewed.   HENT:      Mouth/Throat:      Mouth: Mucous membranes are moist.   Cardiovascular:      Rate and Rhythm: Normal rate and regular rhythm.   Pulmonary:      Effort: Pulmonary effort is normal.      Breath sounds: Normal breath sounds.   Abdominal:      General: Abdomen is flat. There is no distension.      Palpations: Abdomen is soft.      Tenderness: There is no abdominal tenderness. There is no right CVA tenderness or left CVA tenderness.   Skin:     Capillary Refill: Capillary refill takes less than 2 seconds.            Results for orders placed or performed in visit on 05/08/22 (from the past 24 hour(s))   UA Macro with Reflex to Micro and Culture - lab collect    Specimen: Urine, Midstream   Result Value Ref Range    Color Urine Yellow Colorless, Straw, Light Yellow, Yellow    Appearance Urine Clear Clear    Glucose Urine >=1000 (A) Negative mg/dL    Bilirubin Urine Negative Negative    Ketones Urine Negative Negative mg/dL    Specific Gravity Urine 1.010 1.003 - 1.035    Blood Urine Negative Negative    pH Urine 6.0 5.0 - 7.0    Protein Albumin Urine Negative Negative mg/dL    Urobilinogen Urine 0.2 0.2, 1.0 E.U./dL    Nitrite Urine Negative Negative    Leukocyte Esterase Urine Negative Negative    " Narrative    Microscopic not indicated   Hemoglobin A1c   Result Value Ref Range    Hemoglobin A1C 8.2 (H) 0.0 - 5.6 %     *Note: Due to a large number of results and/or encounters for the requested time period, some results have not been displayed. A complete set of results can be found in Results Review.

## 2022-05-19 ENCOUNTER — PRE VISIT (OUTPATIENT)
Dept: UROLOGY | Facility: CLINIC | Age: 57
End: 2022-05-19
Payer: COMMERCIAL

## 2022-05-19 NOTE — TELEPHONE ENCOUNTER
Reason for visit: post op              Relevant information: Uterosacral ligament suspension     Records/imaging/labs/orders: all records available     Pt called: no need for a call     At Rooming: collect a urine/pvr, undress for exam    Ladan Vitale CMA  5/19/2022  12:32 PM

## 2022-05-22 ENCOUNTER — HOSPITAL ENCOUNTER (EMERGENCY)
Facility: CLINIC | Age: 57
Discharge: HOME | End: 2022-05-22
Attending: PHYSICIAN ASSISTANT | Admitting: PHYSICIAN ASSISTANT
Payer: COMMERCIAL

## 2022-05-22 VITALS
TEMPERATURE: 98.4 F | RESPIRATION RATE: 18 BRPM | HEIGHT: 64 IN | OXYGEN SATURATION: 96 % | BODY MASS INDEX: 18.44 KG/M2 | HEART RATE: 83 BPM | SYSTOLIC BLOOD PRESSURE: 158 MMHG | WEIGHT: 108 LBS | DIASTOLIC BLOOD PRESSURE: 93 MMHG

## 2022-05-22 DIAGNOSIS — S61.552A DOG BITE OF LEFT WRIST, INITIAL ENCOUNTER: ICD-10-CM
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
## 2022-05-22 PROCEDURE — 99283 EMERGENCY DEPT VISIT LOW MDM: CPT

## 2022-05-22 PROCEDURE — 250N000013 HC RX MED GY IP 250 OP 250 PS 637: Performed by: PHYSICIAN ASSISTANT

## 2022-05-22 RX ORDER — FLUCONAZOLE 150 MG/1
TABLET ORAL
Qty: 2 TABLET | Refills: 0 | Status: SHIPPED | OUTPATIENT
Start: 2022-05-22 | End: 2022-05-25

## 2022-05-22 RX ADMIN — AMOXICILLIN AND CLAVULANATE POTASSIUM 1 TABLET: 875; 125 TABLET, FILM COATED ORAL at 20:59

## 2022-05-22 ASSESSMENT — ENCOUNTER SYMPTOMS
NUMBNESS: 0
WEAKNESS: 1
WOUND: 1

## 2022-05-23 ENCOUNTER — TELEPHONE (OUTPATIENT)
Dept: FAMILY MEDICINE | Facility: CLINIC | Age: 57
End: 2022-05-23
Payer: COMMERCIAL

## 2022-05-23 NOTE — ED PROVIDER NOTES
History     Chief Complaint:  Dog Bite       HPI   Lynn Thompson is a 57 year old female who presents with a dog bite to the left wrist.  The patient reports just prior to arrival her dogs got in a fight and her and her  had to break up the fight.   One of the dogs accidentally bit her left wrist.  She reports dogs are up-to-date on rabies vaccination.  Patient's tetanus is up-to-date. She denies distal numbness or tingling. She is concerned with some distal weakness and concerned for tendon involvement.  She reports a history of dog bite to the hand that required IV antibiotic.    Allergies:  Corticosteroids  Povidone Iodine  Nsaids  Povidone Iodine  Sulfasalazine     Medications:    amoxicillin-clavulanate (AUGMENTIN) 875-125 MG tablet  fluconazole (DIFLUCAN) 150 MG tablet  amylase-lipase-protease (VIOKACE) 27494-46328 units TABS tablet  calcium carbonate 600 mg-vitamin D 400 units (CALTRATE) 600-400 MG-UNIT per tablet  DULoxetine (CYMBALTA) 20 MG capsule  escitalopram (LEXAPRO) 20 MG tablet  estradiol (ESTRACE) 0.1 MG/GM vaginal cream  famotidine (PEPCID) 40 MG tablet  FIASP PENFILL 100 UNIT/ML SOCT  gabapentin (NEURONTIN) 300 MG capsule  Glucagon (GVOKE HYPOPEN 1-PACK) 1 MG/0.2ML SOAJ  glucose 40 % GEL  hydrOXYzine (ATARAX) 25 MG tablet  insulin glargine (LANTUS PEN) 100 UNIT/ML pen  levothyroxine (SYNTHROID/LEVOTHROID) 100 MCG tablet  loratadine (CLARITIN) 10 MG tablet  modafinil (PROVIGIL) 200 MG tablet  omeprazole (PRILOSEC) 40 MG DR capsule  traZODone (DESYREL) 50 MG tablet        Past Medical History:    Past Medical History:   Diagnosis Date     Benign paroxysmal positional vertigo      Calculus of kidney 05/2005     Chronic abdominal pain 07/17/2013     Chronic pancreatitis 07/17/2013     Depression      Dyspepsia 06/1999     Headaches      Hypertension 02/22/2016     Iron deficiency anemia 11/2003     Post-pancreatectomy diabetes melltius 05/17/2013     Sleep apnea      Spasm of sphincter of  Shannoni      Thyroid nodule 11/01/2016       Patient Active Problem List    Diagnosis Date Noted     Health Care Home 05/03/2011     Priority: High     EMERGENCY CARE PLAN  Presenting Problem Signs and Symptoms Treatment Plan    Questions or conerns during clinic hours    I will call the clinic directly     Questions or conerns outside clinic hours    I will call the 24 hour nurse line at [phone]    Patient needs to schedule an appointment    I will call the 24 hour scheduling team at [phone] or clinic directly    Same day treatment     I will call the clinic first, nurse line if after hours, urgent care and express care if needed                            DX V65.8 REPLACED WITH [id number] HEALTH CARE HOME (04/08/2013)       Small bowel obstruction (H) 11/23/2021     Priority: Medium     Acute kidney injury (H) 11/23/2021     Priority: Medium     Uterovaginal prolapse, unspecified 11/03/2021     Priority: Medium     Rectal prolapse 11/03/2021     Priority: Medium     Other iron deficiency anemia 03/15/2021     Priority: Medium     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     Priority: Medium     Acquired hypothyroidism 11/03/2016     Priority: Medium     Thyroid nodule 11/01/2016     Priority: Medium     Dysthymia 03/01/2016     Priority: Medium     Anxiety 03/01/2016     Priority: Medium     S/P exploratory laparotomy 12/29/2015     Priority: Medium     BLU (obstructive sleep apnea) 12/23/2015     Priority: Medium     Asplenia 10/24/2014     Priority: Medium     Exocrine pancreatic insufficiency 05/17/2013     Priority: Medium     Chronic pain syndrome 02/23/2013     Priority: Medium     Gastric bypass status for obesity 10/26/2010     Priority: Medium     Iron deficiency anemia secondary to inadequate dietary iron intake 12/28/2004     Priority: Medium     relates to gastric bypass          Past Surgical History:    Past Surgical History:   Procedure Laterality Date     ABDOMINOPLASTY  06/24/2002    Tummy  sherrieck     APPENDECTOMY  1990     BUNIONECTOMY Right 1998     CBD Stent placement  2002    CBD stent; Dr. Presley      SECTION       CHOLECYSTECTOMY       COLONOSCOPY N/A 2021    Procedure: COLONOSCOPY INCOMPLETE Aborted due to incomplete prep  will need to take additional prep and return tomorrow 21;  Surgeon: Ihsan Saenz MD;  Location: [facility]     COMBINED CYSTOSCOPY, RETROGRADES, URETEROSCOPY, LASER HOLMIUM LITHOTRIPSY URETER(S), INSERT STENT Right 2015    Procedure: COMBINED CYSTOSCOPY, RETROGRADES, URETEROSCOPY, LASER HOLMIUM LITHOTRIPSY URETER(S), INSERT STENT;  Surgeon: Kennedi Aldana MD;  Location: [facility]     COMBINED CYSTOSCOPY, RETROGRADES, URETEROSCOPY, LASER HOLMIUM LITHOTRIPSY URETER(S), INSERT STENT Right 2015    Procedure: COMBINED CYSTOSCOPY, RETROGRADES, URETEROSCOPY, LASER HOLMIUM LITHOTRIPSY URETER(S), INSERT STENT;  Surgeon: Kennedi Aldana MD;  Location: [facility]     CYSTECTOMY OVARIAN BENIGN Right      CYSTOSCOPY, RETROGRADES, INSERT STENT URETER(S), COMBINED  10/02/2012    Procedure: COMBINED CYSTOSCOPY, RETROGRADES, INSERT STENT URETER(S);  COMBINED CYSTOSCOPY,  , INSERT LEFT STENT URETER;  Surgeon: Johny Baez MD;  Location: [facility]     ESOPHAGOSCOPY, GASTROSCOPY, DUODENOSCOPY (EGD), COMBINED N/A 2018    Procedure: COMBINED ESOPHAGOSCOPY, GASTROSCOPY, DUODENOSCOPY (EGD);  ESOPHAGOSCOPY, GASTROSCOPY, DUODENOSCOPY (EGD)    ;  Surgeon: Tamir Rodgers MD;  Location: [facility]     EXTRACORPOREAL SHOCK WAVE LITHOTRIPSY (ESWL)  10/16/2012    Procedure: EXTRACORPOREAL SHOCK WAVE LITHOTRIPSY (ESWL);  left EXTRACORPOREAL SHOCK WAVE LITHOTRIPSY (ESWL) ;  Surgeon: Johny Baez MD;  Location: [facility]     Gastric bypass NOS       HERNIA REPAIR  2015     HYSTERECTOMY SUPRACERVICAL, BILATERAL SALPINGO-OOPHORECTOMY, COMBINED N/A 2022    Procedure: Abdominal supracervical hysterectomy, bilateral salpingooophrectomy;  Surgeon: Nicole  Nicole Zaman MD;  [facility]     IRRIGATION AND DEBRIDEMENT HAND, COMBINED Left 10/30/2020    Procedure: Left hand sharp excisional debridement of skin, subcutaneous tissue and fat with a scalpel, 2 x 1 x 1 cm.;  Surgeon: Demian Renteria MD;  Location: [facility]     LAPAROSCOPIC LYSIS ADHESIONS N/A 02/20/2015    Procedure: LAPAROSCOPIC LYSIS ADHESIONS;  Surgeon: Aaron Early MD;  [facility]     LAPAROSCOPIC LYSIS ADHESIONS N/A 12/29/2015    Procedure: LAPAROSCOPIC LYSIS ADHESIONS;  Surgeon: Aaron Early MD;  [facility]     PANCREATECTOMY, TRANSPLANT AUTO ISLET CELL, COMBINED  05/10/2013    Procedure: COMBINED PANCREATECTOMY, TRANSPLANT AUTO ISLET CELL;  Pancreatectomy, Auto Islet Cell Transplant   hernia repair, jejunostomy tube and liver biopsies with Anesthesia General with block;  Surgeon: Aaron Early MD;  [facility]     Partial ileum resection  1992     RECTOPEXY ABDOMINAL N/A 02/01/2022    Procedure: RECTOPEXY, ABDOMINAL;  Surgeon: Uriah Sheridan MD;  [facility]     REPAIR PTOSIS BROW BILATERAL Bilateral 06/09/2020    Procedure: BILATERAL BROW PTOSIS REPAIR;  Surgeon: Denise Ablerts MD;  Location: [facility]     SACROCOLPOPEXY, CYSTOSCOPY, COMBINED N/A 02/01/2022    Procedure: Uterosacral ligament suspension, pina colposuspension with Cystoscopy;  Surgeon: Nicole Rivera MD;  [facility]     SIGMOIDOSCOPY FLEXIBLE N/A 02/01/2022    Procedure: SIGMOIDOSCOPY, FLEXIBLE;  Surgeon: Uriah Sheridan MD;  [facility]     Surgery for SBO  2015     TONSILLECTOMY, ADENOIDECTOMY, COMBINED  1997        Family History:    family history includes Anxiety Disorder in her daughter, paternal grandmother, and son; Asthma in her daughter and father; Cerebrovascular Disease in her paternal grandmother; Coronary Artery Disease in her maternal grandfather and paternal grandfather; Depression in her father; Family History Negative in her mother;  "Gastrointestinal Disease in her maternal grandmother; Genitourinary Problems in her brother and father; Heart Disease in her paternal grandfather; Hyperlipidemia in her maternal grandfather and paternal grandfather; Osteoporosis in her paternal grandmother; Respiratory in her father; Substance Abuse in her father.    Social History:  Presents with her      PCP: Maryana Brooks     Review of Systems   Skin: Positive for wound.   Neurological: Positive for weakness. Negative for numbness.   All other systems reviewed and are negative.      Physical Exam     Patient Vitals for the past 24 hrs:   BP Temp Temp src Pulse Resp SpO2 Height Weight   05/22/22 1925 (!) 158/93 -- -- -- -- -- -- --   05/22/22 1924 -- 98.4  F (36.9  C) Temporal 83 18 96 % 1.626 m (5' 4\") 49 kg (108 lb)        Physical Exam  Constitutional: alert, cooperative   CV: 2+ radial pulse, brisk distal cap refill  Pulm: No respiratory distress  MSK: Left upper extremity: 1 puncture wound to the dorsal wrist and 2 puncture wound to the volar wrist. Associated mild tenderness. No tenderness with palpation throughout the remaining finger bones. Full flexion extension of wrist without pain or difficulty. The finger flexors (FDS/FDP) and extensors are intact. Able to make okay sign, thumbs up, peace sign and cross fingers.  The thumb exam is normal, including: Adduction, abduction, flexion, extension, opposition. There are no sensory deficits, Median, Ulnar, and Radial nerve function is normal. Radial artery pulsations are normal. Capillary refill is normal.   Neurological: Alert, attentive. See MSK exam.   Skin: Skin is warm and dry. See MSK exam.   Psych: Normal mood and affect               Emergency Department Course     Interventions:  Medications   amoxicillin-clavulanate (AUGMENTIN) 875-125 MG per tablet 1 tablet (1 tablet Oral Given 5/22/22 2059)        Emergency Department Course:  Past medical records, nursing notes, and vitals " reviewed.  I performed an exam of the patient and obtained history, as documented above.    I rechecked the patient. Findings and plan explained to the Patient. Patient was discharged.    Impression & Plan      Medical Decision Making:  Lynn Thompson is a 57 year old female who presents for evaluation of a dog bite to left wrist. The workup here in the ED shows no signs of compartment syndrome, significant lacerations, or bony injury.  No signs of foreign body or dog teeth in wound.  Dog is known so will have them observe for signs of rabies; no rabies shots indicated from ED.  The wounds were scrubbed and washed out with high pressure irrigation.  Will start augmentin and have them observe for signs of infection (pain, redness, warmth, red streaks, etc).       Patient has a history of dog bite requiring IV antibiotics. We elected to treat conservatively and will place a wrist splint and have her follow-up with hand specialist.  I called the hand trauma voicemail and patient should receive a call to follow-up in the next 1 to 2 days.    Diagnosis:    ICD-10-CM    1. Dog bite of left wrist, initial encounter  S61.552A     W54.0XXA         Discharge Medications:     Medication List      Started    amoxicillin-clavulanate 875-125 MG tablet  Commonly known as: AUGMENTIN  1 tablet, Oral, 2 TIMES DAILY     fluconazole 150 MG tablet  Commonly known as: Diflucan  Take one tablet now, and one tablet in three days if symptoms persist             5/22/2022   Aileen Al PA-C, PA-C  05/22/22 [id number]

## 2022-05-23 NOTE — ED TRIAGE NOTES
Here for dog bite to left lower forearm and wrist. Is a family dog and shots are up to date. Stated last tetanus shot about 2 years ago. ABCs intact.      Triage Assessment     Row Name 05/22/22 1923       Triage Assessment (Adult)    Airway WDL WDL       Respiratory WDL    Respiratory WDL WDL       Cardiac WDL    Cardiac WDL WDL

## 2022-05-23 NOTE — TELEPHONE ENCOUNTER
[facility] outreach to pt for ER follow up - dog bite    Pt states she is doing well.   No signs of infection a this time.  No warmth, redness or streaks.  She still has some swelling and pain.        She is taking the antibiotic and will call to schedule with hand surgeon today.     Advised with any signs of injection call back to [facility].     Pt expressed understanding and acceptance of the plan. had no further questions at this time.  Advised can call back to clinic at any time with concerns.       Sarika Osullivan RN

## 2022-05-23 NOTE — DISCHARGE INSTRUCTIONS
*Take medications as prescribed.  Recommend taking a probiotic while taking this medication.   *Follow-up with your doctor or orthopedics for a recheck in the next 24-48 hours.  *Return if you develop fever, rapid spread or pain/redness/warmth, worsening pain, or become worse in any way.        Discharge Instructions  Cellulitis    Cellulitis is an infection of the skin that occurs when bacteria enter the skin.   Symptoms are generally redness, swelling, warmth and pain.  Your infection appeared to be appropriate to treat at home with antibiotics.  However, sometimes your infection may be worse than it seemed at first, or may worsen with time. If you have new or worse symptoms, you may need to be seen again in the Emergency Department or by your primary doctor.    Return to the Emergency Department if:  The redness, pain, or swelling gets a lot worse.  If the red area was marked, return if it is red beyond the marked area.  You are unable to get your antibiotics, or are vomiting them up or you can t take them.  You are feeling more ill, weak or lightheaded.  You start to run a new fever (temperature >101).  Anything else about the infection worries or concerns you.  Treatment:  Start your antibiotics right away, and take them as prescribed. Be sure to finish the whole prescription, even if you are better.  Apply a heating pad, warm packs, or warm water soaks to the infected area for 15 minutes at a time, at least 3 times a day. Do not use a heating pad on your feet or legs if you have diabetes. Do not sleep with a heating pad on, since this can cause burns or skin injury.  Rest your injured area for at least 1-2 days. After that you may start using your extremity again as long as there is not too much pain.   Raise the injured area above the level of your heart as much as possible in the first 1-2 days.  Tylenol  (acetaminophen), Motrin  (ibuprofen), or Advil  (ibuprofen) may help may help reduce pain and fever  "and may help you feel more comfortable. Be sure to read and follow the package directions, and ask your doctor if you have questions.    Follow-up with your doctor:  Re-check in clinic within 2-3 days.  Probiotics: If you have been given an antibiotic, you may want to also take a probiotic pill or eat yogurt with live cultures. Probiotics have \"good bacteria\" to help your intestines stay healthy. Studies have shown that probiotics help prevent diarrhea and other intestine problems (including C. diff infection) when you take antibiotics. You can buy these without a prescription in the pharmacy section of the store.     If you were given a prescription for medicine here today, be sure to read all of the information (including the package insert) that comes with your prescription.  This will include important information about the medicine, its side effects, and any warnings that you need to know about.  The pharmacist who fills the prescription can provide more information and answer questions you may have about the medicine.  If you have questions or concerns that the pharmacist cannot address, please call or return to the Emergency Department.     "

## 2022-05-26 ENCOUNTER — TRANSFERRED RECORDS (OUTPATIENT)
Dept: HEALTH INFORMATION MANAGEMENT | Facility: CLINIC | Age: 57
End: 2022-05-26

## 2022-06-01 ENCOUNTER — TRANSFERRED RECORDS (OUTPATIENT)
Dept: HEALTH INFORMATION MANAGEMENT | Facility: CLINIC | Age: 57
End: 2022-06-01
Payer: COMMERCIAL

## 2022-06-01 DIAGNOSIS — Z90.410 POST-PANCREATECTOMY DIABETES (H): Primary | ICD-10-CM

## 2022-06-01 DIAGNOSIS — E13.9 POST-PANCREATECTOMY DIABETES (H): Primary | ICD-10-CM

## 2022-06-01 DIAGNOSIS — E89.1 POST-PANCREATECTOMY DIABETES (H): Primary | ICD-10-CM

## 2022-06-01 LAB — RETINOPATHY: NEGATIVE

## 2022-06-07 DIAGNOSIS — K86.89 PANCREATIC INSUFFICIENCY: ICD-10-CM

## 2022-06-07 RX ORDER — PANCRELIPASE 20880; 78300; 78300 [USP'U]/1; [USP'U]/1; [USP'U]/1
TABLET ORAL
Qty: 500 TABLET | Refills: 0 | Status: SHIPPED | OUTPATIENT
Start: 2022-06-07 | End: 2022-09-15

## 2022-06-14 ENCOUNTER — APPOINTMENT (OUTPATIENT)
Dept: GENERAL RADIOLOGY | Facility: CLINIC | Age: 57
End: 2022-06-14
Attending: EMERGENCY MEDICINE
Payer: COMMERCIAL

## 2022-06-14 ENCOUNTER — APPOINTMENT (OUTPATIENT)
Dept: CT IMAGING | Facility: CLINIC | Age: 57
End: 2022-06-14
Attending: EMERGENCY MEDICINE
Payer: COMMERCIAL

## 2022-06-14 ENCOUNTER — HOSPITAL ENCOUNTER (EMERGENCY)
Facility: CLINIC | Age: 57
Discharge: HOME | End: 2022-06-15
Attending: EMERGENCY MEDICINE | Admitting: EMERGENCY MEDICINE
Payer: COMMERCIAL

## 2022-06-14 DIAGNOSIS — N30.00 ACUTE CYSTITIS WITHOUT HEMATURIA: ICD-10-CM

## 2022-06-14 LAB
ALBUMIN UR-MCNC: NEGATIVE MG/DL
ANION GAP SERPL CALCULATED.3IONS-SCNC: <1 MMOL/L (ref 3–14)
APPEARANCE UR: CLEAR
BACTERIA #/AREA URNS HPF: ABNORMAL /HPF
BASOPHILS # BLD AUTO: 0.1 10E3/UL (ref 0–0.2)
BASOPHILS NFR BLD AUTO: 1 %
BILIRUB UR QL STRIP: NEGATIVE
BUN SERPL-MCNC: 21 MG/DL (ref 7–30)
CALCIUM SERPL-MCNC: 9.1 MG/DL (ref 8.5–10.1)
CHLORIDE BLD-SCNC: 106 MMOL/L (ref 94–109)
CO2 SERPL-SCNC: 32 MMOL/L (ref 20–32)
COLOR UR AUTO: ABNORMAL
CREAT SERPL-MCNC: 0.74 MG/DL (ref 0.52–1.04)
D DIMER PPP FEU-MCNC: 0.54 UG/ML FEU (ref 0–0.5)
EOSINOPHIL # BLD AUTO: 0.1 10E3/UL (ref 0–0.7)
EOSINOPHIL NFR BLD AUTO: 1 %
ERYTHROCYTE [DISTWIDTH] IN BLOOD BY AUTOMATED COUNT: 15.2 % (ref 10–15)
GFR SERPL CREATININE-BSD FRML MDRD: >90 ML/MIN/1.73M2
GLUCOSE BLD-MCNC: 104 MG/DL (ref 70–99)
GLUCOSE UR STRIP-MCNC: NEGATIVE MG/DL
HCT VFR BLD AUTO: 37.8 % (ref 35–47)
HGB BLD-MCNC: 11.9 G/DL (ref 11.7–15.7)
HGB UR QL STRIP: NEGATIVE
HOLD SPECIMEN: NORMAL
IMM GRANULOCYTES # BLD: 0 10E3/UL
IMM GRANULOCYTES NFR BLD: 0 %
KETONES UR STRIP-MCNC: NEGATIVE MG/DL
LEUKOCYTE ESTERASE UR QL STRIP: ABNORMAL
LYMPHOCYTES # BLD AUTO: 2.6 10E3/UL (ref 0.8–5.3)
LYMPHOCYTES NFR BLD AUTO: 31 %
MCH RBC QN AUTO: 29.5 PG (ref 26.5–33)
MCHC RBC AUTO-ENTMCNC: 31.5 G/DL (ref 31.5–36.5)
MCV RBC AUTO: 94 FL (ref 78–100)
MONOCYTES # BLD AUTO: 0.7 10E3/UL (ref 0–1.3)
MONOCYTES NFR BLD AUTO: 8 %
NEUTROPHILS # BLD AUTO: 5 10E3/UL (ref 1.6–8.3)
NEUTROPHILS NFR BLD AUTO: 59 %
NITRATE UR QL: NEGATIVE
NRBC # BLD AUTO: 0 10E3/UL
NRBC BLD AUTO-RTO: 0 /100
PH UR STRIP: 6 [PH] (ref 5–7)
PLATELET # BLD AUTO: 375 10E3/UL (ref 150–450)
POTASSIUM BLD-SCNC: 4 MMOL/L (ref 3.4–5.3)
RBC # BLD AUTO: 4.03 10E6/UL (ref 3.8–5.2)
RBC URINE: 1 /HPF
SODIUM SERPL-SCNC: 136 MMOL/L (ref 133–144)
SP GR UR STRIP: 1.01 (ref 1–1.03)
SQUAMOUS EPITHELIAL: 1 /HPF
TROPONIN I SERPL HS-MCNC: 8 NG/L
UROBILINOGEN UR STRIP-MCNC: NORMAL MG/DL
WBC # BLD AUTO: 8.4 10E3/UL (ref 4–11)
WBC URINE: 116 /HPF

## 2022-06-14 PROCEDURE — 258N000003 HC RX IP 258 OP 636: Performed by: EMERGENCY MEDICINE

## 2022-06-14 PROCEDURE — 87088 URINE BACTERIA CULTURE: CPT | Performed by: EMERGENCY MEDICINE

## 2022-06-14 PROCEDURE — 71275 CT ANGIOGRAPHY CHEST: CPT

## 2022-06-14 PROCEDURE — 80048 BASIC METABOLIC PNL TOTAL CA: CPT | Performed by: EMERGENCY MEDICINE

## 2022-06-14 PROCEDURE — 96374 THER/PROPH/DIAG INJ IV PUSH: CPT | Mod: 59

## 2022-06-14 PROCEDURE — 71046 X-RAY EXAM CHEST 2 VIEWS: CPT

## 2022-06-14 PROCEDURE — 250N000011 HC RX IP 250 OP 636: Performed by: EMERGENCY MEDICINE

## 2022-06-14 PROCEDURE — 36415 COLL VENOUS BLD VENIPUNCTURE: CPT | Performed by: EMERGENCY MEDICINE

## 2022-06-14 PROCEDURE — 99285 EMERGENCY DEPT VISIT HI MDM: CPT | Mod: 25

## 2022-06-14 PROCEDURE — 93005 ELECTROCARDIOGRAM TRACING: CPT

## 2022-06-14 PROCEDURE — 96361 HYDRATE IV INFUSION ADD-ON: CPT

## 2022-06-14 PROCEDURE — 84484 ASSAY OF TROPONIN QUANT: CPT | Performed by: EMERGENCY MEDICINE

## 2022-06-14 PROCEDURE — 85379 FIBRIN DEGRADATION QUANT: CPT | Performed by: EMERGENCY MEDICINE

## 2022-06-14 PROCEDURE — 81003 URINALYSIS AUTO W/O SCOPE: CPT | Performed by: EMERGENCY MEDICINE

## 2022-06-14 PROCEDURE — 250N000013 HC RX MED GY IP 250 OP 250 PS 637: Performed by: EMERGENCY MEDICINE

## 2022-06-14 PROCEDURE — 85025 COMPLETE CBC W/AUTO DIFF WBC: CPT | Performed by: EMERGENCY MEDICINE

## 2022-06-14 RX ORDER — ACETAMINOPHEN 325 MG/1
650 TABLET ORAL ONCE
Status: COMPLETED | OUTPATIENT
Start: 2022-06-14 | End: 2022-06-14

## 2022-06-14 RX ORDER — ONDANSETRON 2 MG/ML
4 INJECTION INTRAMUSCULAR; INTRAVENOUS ONCE
Status: COMPLETED | OUTPATIENT
Start: 2022-06-14 | End: 2022-06-14

## 2022-06-14 RX ORDER — IOPAMIDOL 755 MG/ML
500 INJECTION, SOLUTION INTRAVASCULAR ONCE
Status: COMPLETED | OUTPATIENT
Start: 2022-06-14 | End: 2022-06-14

## 2022-06-14 RX ADMIN — ONDANSETRON 4 MG: 2 INJECTION INTRAMUSCULAR; INTRAVENOUS at 23:07

## 2022-06-14 RX ADMIN — IOPAMIDOL 45 ML: 755 INJECTION, SOLUTION INTRAVENOUS at 23:31

## 2022-06-14 RX ADMIN — SODIUM CHLORIDE 71 ML: 9 INJECTION, SOLUTION INTRAVENOUS at 23:31

## 2022-06-14 RX ADMIN — ACETAMINOPHEN 650 MG: 325 TABLET ORAL at 23:04

## 2022-06-14 ASSESSMENT — ENCOUNTER SYMPTOMS
FEVER: 0
DYSURIA: 1
PSYCHIATRIC NEGATIVE: 1
PALPITATIONS: 0
GASTROINTESTINAL NEGATIVE: 1
HEMATOLOGIC/LYMPHATIC NEGATIVE: 1
NEUROLOGICAL NEGATIVE: 1
CHEST TIGHTNESS: 1
SHORTNESS OF BREATH: 0
UNEXPECTED WEIGHT CHANGE: 1
FATIGUE: 1

## 2022-06-15 VITALS
TEMPERATURE: 98.7 F | WEIGHT: 107 LBS | OXYGEN SATURATION: 96 % | HEART RATE: 46 BPM | RESPIRATION RATE: 18 BRPM | SYSTOLIC BLOOD PRESSURE: 132 MMHG | DIASTOLIC BLOOD PRESSURE: 71 MMHG | HEIGHT: 64 IN | BODY MASS INDEX: 18.27 KG/M2

## 2022-06-15 LAB
ATRIAL RATE - MUSE: 59 BPM
DIASTOLIC BLOOD PRESSURE - MUSE: NORMAL MMHG
INTERPRETATION ECG - MUSE: NORMAL
P AXIS - MUSE: 33 DEGREES
PR INTERVAL - MUSE: 108 MS
QRS DURATION - MUSE: 72 MS
QT - MUSE: 444 MS
QTC - MUSE: 439 MS
R AXIS - MUSE: 63 DEGREES
SYSTOLIC BLOOD PRESSURE - MUSE: NORMAL MMHG
T AXIS - MUSE: 75 DEGREES
VENTRICULAR RATE- MUSE: 59 BPM

## 2022-06-15 PROCEDURE — 250N000013 HC RX MED GY IP 250 OP 250 PS 637: Performed by: EMERGENCY MEDICINE

## 2022-06-15 RX ORDER — FLUCONAZOLE 150 MG/1
TABLET ORAL
Qty: 2 TABLET | Refills: 0 | Status: SHIPPED | OUTPATIENT
Start: 2022-06-15 | End: 2022-06-18

## 2022-06-15 RX ORDER — CEPHALEXIN 500 MG/1
500 CAPSULE ORAL 3 TIMES DAILY
Qty: 21 CAPSULE | Refills: 0 | Status: SHIPPED | OUTPATIENT
Start: 2022-06-15 | End: 2022-06-22

## 2022-06-15 RX ORDER — CEPHALEXIN 500 MG/1
500 CAPSULE ORAL ONCE
Status: COMPLETED | OUTPATIENT
Start: 2022-06-15 | End: 2022-06-15

## 2022-06-15 RX ADMIN — CEPHALEXIN 500 MG: 500 CAPSULE ORAL at 00:20

## 2022-06-15 NOTE — ED PROVIDER NOTES
History     Chief Complaint:    Chest Pain      HPI   Lynn Thompson is a 57 year old female who presents via car for evaluation of chest discomfort.  She developed some mild chest pressure 2 days ago and today some left-sided chest discomfort worse when she breathes in.  She has had no associated shortness of breath and denies recent fever or URI symptoms.  No history of heart disease.  She is also describing some dysuria.  Medical history is complex and notable for post pancreatectomy insulin-dependent diabetes.  She notes that for the past couple months her blood sugars have been highly erratic despite compliance with her insulin regimen.  She also has felt generally unwell and reports frequent nighttime hunger despite normal p.o. intake.  She is also been experiencing unintentional weight loss.    Review of Systems   Constitutional: Positive for fatigue and unexpected weight change. Negative for fever.   HENT: Negative.    Respiratory: Positive for chest tightness. Negative for shortness of breath.    Cardiovascular: Positive for chest pain. Negative for palpitations and leg swelling.   Gastrointestinal: Negative.    Genitourinary: Positive for dysuria.   Skin: Negative.    Neurological: Negative.    Hematological: Negative.    Psychiatric/Behavioral: Negative.    All other systems reviewed and are negative.    Allergies:  Corticosteroids  Povidone Iodine  Nsaids  Sulfasalazine      Medications:    cephALEXin (KEFLEX) 500 MG capsule  fluconazole (DIFLUCAN) 150 MG tablet  calcium carbonate 600 mg-vitamin D 400 units (CALTRATE) 600-400 MG-UNIT per tablet  DULoxetine (CYMBALTA) 20 MG capsule  escitalopram (LEXAPRO) 20 MG tablet  estradiol (ESTRACE) 0.1 MG/GM vaginal cream  famotidine (PEPCID) 40 MG tablet  FIASP PENFILL 100 UNIT/ML SOCT  gabapentin (NEURONTIN) 300 MG capsule  Glucagon (GVOKE HYPOPEN 1-PACK) 1 MG/0.2ML SOAJ  glucose 40 % GEL  hydrOXYzine (ATARAX) 25 MG tablet  insulin glargine (LANTUS PEN) 100  UNIT/ML pen  insulin pen needle (32G X 4 MM) 32G X 4 MM miscellaneous  levothyroxine (SYNTHROID/LEVOTHROID) 100 MCG tablet  loratadine (CLARITIN) 10 MG tablet  modafinil (PROVIGIL) 200 MG tablet  omeprazole (PRILOSEC) 40 MG DR capsule  traZODone (DESYREL) 50 MG tablet  VIOKACE 97736-84034 units TABS per tablet        Past Medical History:    Past Medical History:   Diagnosis Date     Benign paroxysmal positional vertigo      Calculus of kidney 05/2005     Chronic abdominal pain 07/17/2013     Chronic pancreatitis 07/17/2013     Depression      Dyspepsia 06/1999     Headaches      Hypertension 02/22/2016     Iron deficiency anemia 11/2003     Post-pancreatectomy diabetes melltius 05/17/2013     Sleep apnea      Spasm of sphincter of Oddi      Thyroid nodule 11/01/2016     Patient Active Problem List    Diagnosis Date Noted     Health Care Home 05/03/2011     Priority: High     EMERGENCY CARE PLAN  Presenting Problem Signs and Symptoms Treatment Plan    Questions or conerns during clinic hours    I will call the clinic directly     Questions or conerns outside clinic hours    I will call the 24 hour nurse line at [phone]    Patient needs to schedule an appointment    I will call the 24 hour scheduling team at [phone] or clinic directly    Same day treatment     I will call the clinic first, nurse line if after hours, urgent care and express care if needed                            DX V65.8 REPLACED WITH [id number] HEALTH CARE HOME (04/08/2013)       Small bowel obstruction (H) 11/23/2021     Priority: Medium     Acute kidney injury (H) 11/23/2021     Priority: Medium     Uterovaginal prolapse, unspecified 11/03/2021     Priority: Medium     Rectal prolapse 11/03/2021     Priority: Medium     Other iron deficiency anemia 03/15/2021     Priority: Medium     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     Priority: Medium     Acquired hypothyroidism 11/03/2016     Priority: Medium     Thyroid nodule 11/01/2016      Priority: Medium     Dysthymia 2016     Priority: Medium     Anxiety 2016     Priority: Medium     S/P exploratory laparotomy 2015     Priority: Medium     BLU (obstructive sleep apnea) 2015     Priority: Medium     Asplenia 10/24/2014     Priority: Medium     Exocrine pancreatic insufficiency 2013     Priority: Medium     Chronic pain syndrome 2013     Priority: Medium     Gastric bypass status for obesity 10/26/2010     Priority: Medium     Iron deficiency anemia secondary to inadequate dietary iron intake 2004     Priority: Medium     relates to gastric bypass          Past Surgical History:    Past Surgical History:   Procedure Laterality Date     ABDOMINOPLASTY  2002    Tummy tuck     APPENDECTOMY  1990     BUNIONECTOMY Right 1998     CBD Stent placement  2002    CBD stent; Dr. Presley      SECTION       CHOLECYSTECTOMY       COLONOSCOPY N/A 2021    Procedure: COLONOSCOPY INCOMPLETE Aborted due to incomplete prep  will need to take additional prep and return tomorrow 21;  Surgeon: Ihsan Saenz MD;  Location: [facility]     COMBINED CYSTOSCOPY, RETROGRADES, URETEROSCOPY, LASER HOLMIUM LITHOTRIPSY URETER(S), INSERT STENT Right 2015    Procedure: COMBINED CYSTOSCOPY, RETROGRADES, URETEROSCOPY, LASER HOLMIUM LITHOTRIPSY URETER(S), INSERT STENT;  Surgeon: Kennedi Aldana MD;  Location: [facility]     COMBINED CYSTOSCOPY, RETROGRADES, URETEROSCOPY, LASER HOLMIUM LITHOTRIPSY URETER(S), INSERT STENT Right 2015    Procedure: COMBINED CYSTOSCOPY, RETROGRADES, URETEROSCOPY, LASER HOLMIUM LITHOTRIPSY URETER(S), INSERT STENT;  Surgeon: Kennedi Aldana MD;  [facility]: [facility]     CYSTECTOMY OVARIAN BENIGN Right      CYSTOSCOPY, RETROGRADES, INSERT STENT URETER(S), COMBINED  10/02/2012    Procedure: COMBINED CYSTOSCOPY, RETROGRADES, INSERT STENT URETER(S);  COMBINED CYSTOSCOPY,  , INSERT LEFT STENT URETER;  Surgeon: Nestor  MD Johny;  Location: [facility]     ESOPHAGOSCOPY, GASTROSCOPY, DUODENOSCOPY (EGD), COMBINED N/A 01/24/2018    Procedure: COMBINED ESOPHAGOSCOPY, GASTROSCOPY, DUODENOSCOPY (EGD);  ESOPHAGOSCOPY, GASTROSCOPY, DUODENOSCOPY (EGD)    ;  Surgeon: Tamir Rodgers MD;  Location: [facility]     EXTRACORPOREAL SHOCK WAVE LITHOTRIPSY (ESWL)  10/16/2012    Procedure: EXTRACORPOREAL SHOCK WAVE LITHOTRIPSY (ESWL);  left EXTRACORPOREAL SHOCK WAVE LITHOTRIPSY (ESWL) ;  Surgeon: Johny Baez MD;  Location: [facility]     Gastric bypass NOS       HERNIA REPAIR  02/2015     HYSTERECTOMY SUPRACERVICAL, BILATERAL SALPINGO-OOPHORECTOMY, COMBINED N/A 02/01/2022    Procedure: Abdominal supracervical hysterectomy, bilateral salpingooophrectomy;  Surgeon: Nicole Rivera MD;  [facility]     IRRIGATION AND DEBRIDEMENT HAND, COMBINED Left 10/30/2020    Procedure: Left hand sharp excisional debridement of skin, subcutaneous tissue and fat with a scalpel, 2 x 1 x 1 cm.;  Surgeon: Demian Renteria MD;  Location: [facility]     LAPAROSCOPIC LYSIS ADHESIONS N/A 02/20/2015    Procedure: LAPAROSCOPIC LYSIS ADHESIONS;  Surgeon: Aaron Early MD;  [facility]     LAPAROSCOPIC LYSIS ADHESIONS N/A 12/29/2015    Procedure: LAPAROSCOPIC LYSIS ADHESIONS;  Surgeon: Aaron Early MD;  [facility]     PANCREATECTOMY, TRANSPLANT AUTO ISLET CELL, COMBINED  05/10/2013    Procedure: COMBINED PANCREATECTOMY, TRANSPLANT AUTO ISLET CELL;  Pancreatectomy, Auto Islet Cell Transplant   hernia repair, jejunostomy tube and liver biopsies with Anesthesia General with block;  Surgeon: Aaron Early MD;  [facility]     Partial ileum resection  1992     RECTOPEXY ABDOMINAL N/A 02/01/2022    Procedure: RECTOPEXY, ABDOMINAL;  Surgeon: Uriah Sheridan MD;  [facility]     REPAIR PTOSIS BROW BILATERAL Bilateral 06/09/2020    Procedure: BILATERAL BROW PTOSIS REPAIR;  Surgeon: Denise Alberts MD;  Location: [facility]     SACROCOLPOPEXY,  "CYSTOSCOPY, COMBINED N/A 2022    Procedure: Uterosacral ligament suspension, pina colposuspension with Cystoscopy;  Surgeon: Nicole Rivera MD;  [facility]     SIGMOIDOSCOPY FLEXIBLE N/A 2022    Procedure: SIGMOIDOSCOPY, FLEXIBLE;  Surgeon: Uriah Sheridan MD;  [facility]     Surgery for SBO       TONSILLECTOMY, ADENOIDECTOMY, COMBINED         Family History:    Family History   Problem Relation Age of Onset     Family History Negative Mother      Respiratory Father         COPD;  at 69     Genitourinary Problems Father         kidney stones     Substance Abuse Father      Depression Father      Asthma Father      Heart Disease Paternal Grandfather         M.I.     Coronary Artery Disease Paternal Grandfather      Hyperlipidemia Paternal Grandfather      Genitourinary Problems Brother         multiple brothers with kidney stones     Gastrointestinal Disease Maternal Grandmother         undiagnosed 'gut' issues     Coronary Artery Disease Maternal Grandfather      Hyperlipidemia Maternal Grandfather      Cerebrovascular Disease Paternal Grandmother         At the age of 103     Anxiety Disorder Paternal Grandmother      Osteoporosis Paternal Grandmother      Anxiety Disorder Son      Anxiety Disorder Daughter      Asthma Daughter      Colon Cancer No family hx of        Social History:    Maryana Brooks  The patient presents to the ED with spouse    Physical Exam     Patient Vitals for the past 24 hrs:   BP Temp Temp src Pulse Resp SpO2 Height Weight   06/15/22 0008 132/71 98.7  F (37.1  C) Oral (!) 46 -- 96 % -- --   06/15/22 0000 -- -- -- -- -- 96 % -- --   22 2315 120/71 -- -- -- -- 97 % -- --   22 1909 139/75 97.9  F (36.6  C) Temporal 75 18 98 % 1.626 m (5' 4\") 48.5 kg (107 lb)       Physical Exam  General: Patient is alert and cooperative.  Thin.   HENT:  Normal nose, oropharynx. Moist oral mucosa.  Eyes: EOMI. Normal conjunctiva.  Neck:  " Normal range of motion and appearance.   Cardiovascular:  Normal rate, regular rhythm and normal heart sounds.   Pulmonary/Chest:  Effort normal. No wheezing or crackles.  Abdominal: Soft. No distension or tenderness.     Musculoskeletal: Normal range of motion. No edema or tenderness.   Neurological: oriented, normal strength, sensation, and coordination.   Skin: Warm and dry. No rash or bruising.   Psychiatric: Normal mood and affect. Normal behavior and judgement.      Emergency Department Course     ECG results from 06/14/22   EKG 12-lead, tracing only     Value    Systolic Blood Pressure     Diastolic Blood Pressure     Ventricular Rate 59    Atrial Rate 59    MD Interval 108    QRS Duration 72        QTc 439    P Axis 33    R AXIS 63    T Axis 75    Interpretation ECG      Sinus bradycardia with short MD  Otherwise normal ECG  When compared with ECG of 08-MAR-2019 11:19,  No significant change was found  Confirmed by - EMERGENCY ROOM, PHYSICIAN ([id number]),  HEATH WARNER (Rosario) on 6/15/2022 7:28:26 AM       *Note: Due to a large number of results and/or encounters for the requested time period, some results have not been displayed. A complete set of results can be found in Results Review.       Imaging:  CT Chest Pulmonary Embolism w Contrast   Final Result   IMPRESSION:    1.  No visualized pulmonary embolus.   2.  No evidence of active pulmonary disease.       XR Chest 2 Views   Final Result   IMPRESSION: Negative chest.        Report per radiology    Laboratory:  Labs Ordered and Resulted from Time of ED Arrival to Time of ED Departure   BASIC METABOLIC PANEL - Abnormal       Result Value    Sodium 136      Potassium 4.0      Chloride 106      Carbon Dioxide (CO2) 32      Anion Gap <1 (*)     Urea Nitrogen 21      Creatinine 0.74      Calcium 9.1      Glucose 104 (*)     GFR Estimate >90     D DIMER QUANTITATIVE - Abnormal    D-Dimer Quantitative 0.54 (*)    ROUTINE UA WITH MICROSCOPIC REFLEX TO  CULTURE - Abnormal    Color Urine Light Yellow      Appearance Urine Clear      Glucose Urine Negative      Bilirubin Urine Negative      Ketones Urine Negative      Specific Gravity Urine 1.008      Blood Urine Negative      pH Urine 6.0      Protein Albumin Urine Negative      Urobilinogen Urine Normal      Nitrite Urine Negative      Leukocyte Esterase Urine Large (*)     Bacteria Urine Few (*)     RBC Urine 1      WBC Urine 116 (*)     Squamous Epithelials Urine 1     CBC WITH PLATELETS AND DIFFERENTIAL - Abnormal    WBC Count 8.4      RBC Count 4.03      Hemoglobin 11.9      Hematocrit 37.8      MCV 94      MCH 29.5      MCHC 31.5      RDW 15.2 (*)     Platelet Count 375      % Neutrophils 59      % Lymphocytes 31      % Monocytes 8      % Eosinophils 1      % Basophils 1      % Immature Granulocytes 0      NRBCs per 100 WBC 0      Absolute Neutrophils 5.0      Absolute Lymphocytes 2.6      Absolute Monocytes 0.7      Absolute Eosinophils 0.1      Absolute Basophils 0.1      Absolute Immature Granulocytes 0.0      Absolute NRBCs 0.0     TROPONIN I - Normal    Troponin I High Sensitivity 8     URINE CULTURE       Procedures:      Emergency Department Course:    Reviewed:  I reviewed nursing notes, vitals, past medical history and Care Everywhere    Assessments:   I obtained history and examined the patient as noted above.    I rechecked the patient and explained findings.     Interventions:  Medications   ondansetron (ZOFRAN) injection 4 mg (4 mg Intravenous Given 6/14/22 2307)   acetaminophen (TYLENOL) tablet 650 mg (650 mg Oral Given 6/14/22 2304)   0.9% sodium chloride BOLUS (0 mLs Intravenous Stopped 6/15/22 0005)   iopamidol (ISOVUE-370) solution 500 mL (45 mLs Intravenous Given 6/14/22 2331)   cephALEXin (KEFLEX) capsule 500 mg (500 mg Oral Given 6/15/22 0020)       Disposition:  The patient was discharged to home.     Impression & Plan    Medical Decision Making:  Afebrile 57-year-old female is presented  with complaints of chest discomfort and dysuria as detailed in the HPI.  Her physical examination is unremarkable.  She has had a thorough work-up which has included a urinalysis depicting pyuria.  Given her dysuria, an acute cystitis is suspected.  With respect to her chest discomfort, EKG and chest x-ray are unremarkable.  She has a normal high-sensitivity troponin and a minimally elevated D-dimer with a subsequent negative CT chest.  A low suspicion for an underlying ischemic process given her negative work-up and relatively constant symptoms for a couple days.  The EKG shows no sign of pericarditis and cardiogenic etiology is felt to be very unlikely.  No further ED based testing or admission is necessary.  I have prescribed a course of Keflex and recommend follow-up with her clinic.    Covid-19  Lynn Thompson was evaluated during a global COVID-19 pandemic, which necessitated consideration that the patient might be at risk for infection with the SARS-CoV-2 virus that causes COVID-19.   Applicable protocols for evaluation were followed during the patient's care.   COVID-19 was considered as part of the patient's evaluation.    Diagnosis:    ICD-10-CM    1. Acute cystitis without hematuria  N30.00        Discharge Medications:  Discharge Medication List as of 6/15/2022 12:21 AM      START taking these medications    Details   cephALEXin (KEFLEX) 500 MG capsule Take 1 capsule (500 mg) by mouth 3 times daily for 7 days, Disp-21 capsule, R-0, E-Prescribe      fluconazole (DIFLUCAN) 150 MG tablet Take one tablet now, and one tablet in three days, Disp-2 tablet, R-0, E-Prescribe                    Norbert Mccauley MD  06/15/22 1153

## 2022-06-15 NOTE — ED TRIAGE NOTES
Here for concern of left sided chest heaviness associated with nausea started couple days ago. Today, patient stated chest heaviness is more constant associated with pain during deep breath. Also c/o burning with urination. ABCs intact.     Triage Assessment     Row Name 06/14/22 2383       Triage Assessment (Adult)    Airway WDL WDL       Respiratory WDL    Respiratory WDL WDL       Cardiac WDL    Cardiac WDL WDL

## 2022-06-16 NOTE — RESULT ENCOUNTER NOTE
[facility] Emergency Dept discharge antibiotic (if prescribed): Cephalexin (Keflex) 500 mg capsule, 1 capsule (500 mg) by mouth 3 times daily for 7 days.   Date of Rx (if applicable):  6/15/22  No changes in treatment per [facility] ED Lab Result Urine culture protocol.

## 2022-06-17 LAB
BACTERIA UR CULT: ABNORMAL
BACTERIA UR CULT: ABNORMAL

## 2022-06-17 NOTE — RESULT ENCOUNTER NOTE
Await final culture report per [facility] ED Lab Result protocol.  Chief Complaint:  I am a little better. Length of Stay: 3 Days    Interval History:  Suleiman Pastrana reports feeling better today except for poor sleep. He estimates that he only slept 3-4 hours last night. He remains isolative to his room and was encouraged to participate in the milieu. Denies any SI or plan. Says he had AH last evening which were \"critical and told me to end my life\". Denies any intent to act on this. Pleasant and calm. Past Medical History:  Past Medical History:   Diagnosis Date    Chronic pain     Diabetes ([facility] [address]     Hepatitis C     Hypertension            Labs:  Lab Results   Component Value Date/Time    WBC 4.9 06/13/2020 12:43 AM    HGB 12.5 06/13/2020 12:43 AM    HCT 36.4 (L) 06/13/2020 12:43 AM    PLATELET 178 74/01/4277 12:43 AM    MCV 88.3 06/13/2020 12:43 AM      Lab Results   Component Value Date/Time    Sodium 139 06/13/2020 10:48 AM    Potassium 4.1 06/13/2020 10:48 AM    Chloride 106 06/13/2020 10:48 AM    CO2 30 06/13/2020 10:48 AM    Anion gap 3 (L) 06/13/2020 10:48 AM    Glucose 118 (H) 06/13/2020 10:48 AM    BUN 24 (H) 06/13/2020 10:48 AM    Creatinine 1.67 (H) 06/13/2020 10:48 AM    BUN/Creatinine ratio 14 06/13/2020 10:48 AM    GFR est AA 51 (L) 06/13/2020 10:48 AM    GFR est non-AA 42 (L) 06/13/2020 10:48 AM    Calcium 8.0 (L) 06/13/2020 10:48 AM    Bilirubin, total 0.6 06/13/2020 10:48 AM    Alk.  phosphatase 48 06/13/2020 10:48 AM    Protein, total 7.2 06/13/2020 10:48 AM    Albumin 3.5 06/13/2020 10:48 AM    Globulin 3.7 06/13/2020 10:48 AM    A-G Ratio 0.9 (L) 06/13/2020 10:48 AM    ALT (SGPT) 30 06/13/2020 10:48 AM      Vitals:    06/15/20 1127 06/15/20 1533 06/15/20 1925 06/16/20 0743   BP: (!) 137/91 129/88 95/62 (!) 150/96   Pulse: 98 99 85 90   Resp: 16 16 16 16   Temp: 98 °F (36.7 °C) 98.3 °F (36.8 °C) 98.3 °F (36.8 °C) 98.8 °F (37.1 °C)   SpO2: 98% 96% 96% 97%   Weight:       Height:             Current Facility-Administered Medications   Medication Dose Route Frequency Provider Last Rate Last Dose    sertraline (ZOLOFT) tablet 100 mg  100 mg Oral DAILY Belkis Yeager MD   100 mg at 06/16/20 4072    gabapentin (NEURONTIN) capsule 300 mg  300 mg Oral TID Belkis Yeager MD   300 mg at 06/16/20 3624    lurasidone (LATUDA) tablet 40 mg  40 mg Oral DAILY WITH Thelma Barbosa NP   40 mg at 06/15/20 1635    OLANZapine (ZyPREXA) tablet 5 mg  5 mg Oral Q6H PRN Gregoria Kellie Sanchez, FNP        haloperidol lactate (HALDOL) injection 5 mg  5 mg IntraMUSCular Q6H PRN Gregoria EnriqueGlvalenciae Rachael, FNP        benztropine (COGENTIN) tablet 1 mg  1 mg Oral BID PRN Gregoria Elhame Rachael, FNP        diphenhydrAMINE (BENADRYL) injection 50 mg  50 mg IntraMUSCular BID PRN Gregoria Enrique' V, FNP        hydrOXYzine HCL (ATARAX) tablet 50 mg  50 mg Oral TID PRN Gregoria Nay Rachael, FNP        LORazepam (ATIVAN) injection 1 mg  1 mg IntraMUSCular Q4H PRN Gregoria Enrique' V, FNP        traZODone (DESYREL) tablet 50 mg  50 mg Oral QHS PRN Gregoria Enrique' V, FNP        acetaminophen (TYLENOL) tablet 650 mg  650 mg Oral Q4H PRN Gregoria Enrique' V, FNP        magnesium hydroxide (MILK OF MAGNESIA) 400 mg/5 mL oral suspension 30 mL  30 mL Oral DAILY PRN Gregoria Enrique' V, FNP        nicotine (NICODERM CQ) 14 mg/24 hr patch 1 Patch  1 Patch TransDERmal DAILY Gregoria Kellie Sanchez, FNP   1 Patch at 06/15/20 0839    amLODIPine (NORVASC) tablet 5 mg  5 mg Oral DAILY Gregoria Enrique' V, FNP   5 mg at 06/16/20 3314    atorvastatin (LIPITOR) tablet 10 mg  10 mg Oral QHS Gregoria Enrique' V, FNP   10 mg at 06/15/20 2107    furosemide (LASIX) tablet 40 mg  40 mg Oral DAILY Gregoria Enrique' V, FNP   40 mg at 06/16/20 9356    pantoprazole (PROTONIX) tablet 40 mg  40 mg Oral ACB Gregoria Enrique' V, FNP   40 mg at 89/50/39 6704    folic acid (FOLVITE) tablet 1 mg  1 mg Oral DAILY Gregoria Kellie VILLEDA, FNP   1 mg at 06/16/20 0831    thiamine HCL (B-1) tablet 100 mg  100 mg Oral DAILY Gregoria Kellie VILLEDA, FNP   100 mg at 06/16/20 0831    insulin lispro (HUMALOG) injection   SubCUTAneous AC&HS Lexi Morales, NP   Stopped at 06/15/20 1130    glucose chewable tablet 16 g  4 Tab Oral PRN Lexi Morales, NP        glucagon (GLUCAGEN) injection 1 mg  1 mg IntraMUSCular PRN Lexi Morales, NP        dextrose 10% infusion 0-250 mL  0-250 mL IntraVENous PRN Lexi Morales, DARRIN             Mental Status Exam:  Eye contact: fair  Grooming: fair  Psychomotor activity: decreased  Speech is spontaneous  Mood is \"okay\"  Affect: constricted  Perception: Denies any AH or VH. Suicidal ideation: Denies any SI or plan. Cognition is grossly intact       Physical Exam:  Body habitus: Body mass index is 28.13 kg/m². Musculoskeletal system: normal gait  Tremor - neg  Cog wheeling - neg      Assessment and Plan:  Lalit Darnell meets criteria for a diagnosis of Major depressive disorder, recurrent, severe, without psychotic features. Increased Trazodone to 100mg at bedtime. Increased dosage of Latuda. Continue the medication regimen as prescribed  Disposition planning to continue. I certify that this patients inpatient psychiatric hospital services furnished since the previous certification were, and continue to be, required for treatment that could reasonably be expected to improve the patient's condition, or for diagnostic study, and that the patient continues to need, on a daily basis, active treatment furnished directly by or requiring the supervision of inpatient psychiatric facility personnel. In addition, the hospital records show that services furnished were intensive treatment services, admission or related services, or equivalent services.

## 2022-06-20 ENCOUNTER — MYC MEDICAL ADVICE (OUTPATIENT)
Dept: FAMILY MEDICINE | Facility: CLINIC | Age: 57
End: 2022-06-20
Payer: COMMERCIAL

## 2022-06-20 NOTE — TELEPHONE ENCOUNTER
"Called and spoke with pt regarding MyChart message. Pt was seen in ED for UTI and prescribed:    cephALEXin (KEFLEX) 500 MG capsule 21 capsule 0 6/15/2022 6/22/2022 --   Sig - Route: Take 1 capsule (500 mg) by mouth 3 times daily for 7 days - Oral   Sent to pharmacy as: Cephalexin 500 MG Oral Capsule (KEFLEX)   Class: E-Prescribe   Order: [id number]   E-Prescribing Status: Receipt confirmed by pharmacy (6/15/2022 12:06 AM CDT)     Pt states that her sx have not improved but have not worsened, is concerned she is not on correct antibiotic. Per chart review result note 6/14/22: \"Final Urine Culture Report on 6/18/22  [facility] Emergency Dept discharge antibiotic prescribed: Cephalexin (Keflex) 500 mg capsule, 1 capsule (500 mg) by mouth 3 times daily for 7 days.  #1. Bacteria, 10,000-50,000 CFU/mL Proteus mirabilis Abnormal, is SUSCEPTIBLE to Antibiotic.    #1. Bacteria, 10,000-50,000 CFU/mL Proteus mirabilis Abnormal, is SUSCEPTIBLE to Antibiotic.   No change in treatment per [facility] ED lab result Urine Culture protocol\" Relayed message to pt, verbalized understanding. Pt is scheduled for follow up on 6/22/22, advised pt to keep appt to further discuss. Pt states, \"I have been on antibiotics this entire year, isn't anyone concerned by this?\" Advised pt to discuss concerns with provider at appt. No further questions.     Andrea ARROYO RN    "

## 2022-06-23 ENCOUNTER — VIRTUAL VISIT (OUTPATIENT)
Dept: FAMILY MEDICINE | Facility: CLINIC | Age: 57
End: 2022-06-23
Payer: COMMERCIAL

## 2022-06-23 DIAGNOSIS — R30.0 DYSURIA: Primary | ICD-10-CM

## 2022-06-23 DIAGNOSIS — Z90.410 POST-PANCREATECTOMY DIABETES (H): ICD-10-CM

## 2022-06-23 DIAGNOSIS — R63.4 WEIGHT LOSS: Primary | ICD-10-CM

## 2022-06-23 DIAGNOSIS — E13.9 POST-PANCREATECTOMY DIABETES (H): ICD-10-CM

## 2022-06-23 DIAGNOSIS — K86.89 PANCREATIC INSUFFICIENCY: ICD-10-CM

## 2022-06-23 DIAGNOSIS — E89.1 POST-PANCREATECTOMY DIABETES (H): ICD-10-CM

## 2022-06-23 LAB

## 2022-06-23 PROCEDURE — 99213 OFFICE O/P EST LOW 20 MIN: CPT | Mod: 95 | Performed by: FAMILY MEDICINE

## 2022-06-23 PROCEDURE — 87210 SMEAR WET MOUNT SALINE/INK: CPT | Performed by: FAMILY MEDICINE

## 2022-06-23 PROCEDURE — 81003 URINALYSIS AUTO W/O SCOPE: CPT | Performed by: FAMILY MEDICINE

## 2022-06-23 NOTE — PROGRESS NOTES
Lynn is a 57 year old who is being evaluated via a billable video visit.      How would you like to obtain your AVS? MyChart  If the video visit is dropped, the invitation should be resent by: Text to cell phone: [phone]  Will anyone else be joining your video visit? No      Assessment & Plan     1. Dysuria- will gets below to rule out persistent UTI. Also discussed OAB versus post menopausal urethral and vulvar atrophy that can at times mimic symptoms of UTI. Since she has follow up with Urology already, she will bring these concerns up to them.   - UA Macro with Reflex to Micro and Culture - lab collect; Future  - Wet prep - lab collect; Future      Return in about 1 year (around 6/23/2023) for as needed.    Maryana Brooks MD  [facility]   Lynn is a 57 year old, presenting for the following health issues:  Hospital F/U      HPI     ED/UC Followup:    Facility:  [facility]  Date of visit: 06/14/2022-06/15/2022  Reason for visit: Acute Cystitis without hematuria  Current Status: Pt is still having some burning    Genitourinary - Female  Onset/Duration: 06/12/2022  Description:   Painful urination (Dysuria): YES           Frequency: YES  Blood in urine (Hematuria): no  Delay in urine (Hesitency): no  Intensity: moderate  Progression of Symptoms:  worsening  Accompanying Signs & Symptoms:  Fever/chills: no  Flank pain: YES  Nausea and vomiting: no  Vaginal symptoms: on diflucan  Abdominal/Pelvic Pain: YES  History:   History of frequent UTI s: YES  History of kidney stones: YES  Sexually Active: YES  Possibility of pregnancy: No  Precipitating or alleviating factors: None  Therapies tried and outcome: antibiotics and  diflucan       Still feeling urgency, frequency.   Sometimes seems worse than others.     Has follow up with Urology next month.       Review of Systems   Constitutional, HEENT, cardiovascular, pulmonary, gi and gu systems are negative, except as  "otherwise noted.      Objective    Vitals - Patient Reported  Weight (Patient Reported): 47.2 kg (104 lb)  Height (Patient Reported): 162.6 cm (5' 4\")  BMI (Based on Pt Reported Ht/Wt): 17.85  Vitals:  No vitals were obtained today due to virtual visit.    Physical Exam   GENERAL: Healthy, alert and no distress  EYES: Eyes grossly normal to inspection.  No discharge or erythema, or obvious scleral/conjunctival abnormalities.  RESP: No audible wheeze, cough, or visible cyanosis.  No visible retractions or increased work of breathing.    SKIN: Visible skin clear. No significant rash, abnormal pigmentation or lesions.  NEURO: Cranial nerves grossly intact.  Mentation and speech appropriate for age.  PSYCH: Mentation appears normal, affect normal/bright, judgement and insight intact, normal speech and appearance well-groomed.          Video-Visit Details    Video Start Time: 9:33 AM    Type of service:  Video Visit    Video End Time:9:40 AM    Originating Location (pt. Location): Home    Distant Location (provider location):  [facility]     Platform used for Video Visit: Lizbeth    .  ..  "

## 2022-06-29 DIAGNOSIS — K21.00 GASTROESOPHAGEAL REFLUX DISEASE WITH ESOPHAGITIS: ICD-10-CM

## 2022-06-29 RX ORDER — FAMOTIDINE 40 MG/1
40 TABLET, FILM COATED ORAL 2 TIMES DAILY PRN
Qty: 180 TABLET | Refills: 1 | Status: SHIPPED | OUTPATIENT
Start: 2022-06-29 | End: 2022-12-13

## 2022-07-01 ENCOUNTER — VIRTUAL VISIT (OUTPATIENT)
Dept: TRANSPLANT | Facility: CLINIC | Age: 57
End: 2022-07-01
Attending: PEDIATRICS
Payer: COMMERCIAL

## 2022-07-01 DIAGNOSIS — E13.9 POST-PANCREATECTOMY DIABETES (H): ICD-10-CM

## 2022-07-01 DIAGNOSIS — E89.1 POST-PANCREATECTOMY DIABETES (H): ICD-10-CM

## 2022-07-01 DIAGNOSIS — Z90.410 POST-PANCREATECTOMY DIABETES (H): ICD-10-CM

## 2022-07-01 DIAGNOSIS — R63.4 WEIGHT LOSS: Primary | ICD-10-CM

## 2022-07-01 PROCEDURE — 97802 MEDICAL NUTRITION INDIV IN: CPT | Mod: TEL,95 | Performed by: DIETITIAN, REGISTERED

## 2022-07-01 NOTE — PROGRESS NOTES
Pt evaluated via billable telephone visit d/t COVID-19 restrictions. Time spent: 30 min    [facility]  Outpatient MNT     Time Spent: 30 minutes  Visit Type: Initial  Referring Physician: Margaret   Reason for RD Visit: wt loss  Pt accompanied by: self     Nutrition Assessment  Pt has h/o tp ait 2013, gastric bypass 2001   She last saw Dr Robles in Feb. She had restarted on insulin 4 years post surgery. Last A1c slightly up to 8.2 (May 2022), prev 6-7. She reports having a CGM and some highs and lows. She reports highs hours after she eats. She reports having met with some CDE in the [employer] system, but finds their recommendations have not been successful. She reports her CGM report says 70% TIR. Interestingly, she did change from Novolog to Fiasp last fall, right before she started losing weight. She said this change occurred d/t insurance stopped covering Novolog. She is unsure what other fast acting insulins her insurance covers.     She reports weight was 125-130 lbs last fall. She is now down to 104 lbs. She reports she still sees the 260+ lb person in the mirror, but noticed her skeletal appearance when her  took a photo of her. She reports no change in activity or eating since last fall that would explain her weight loss. She does report always having a fear of regaining weight w/ history gastric bypass and being overweight. She reports occasional binging behavior, where she can't stop eating until she gets overly full. Her  has to limit some food portions and put the food out of sight for her. She reports she does see someone for mental health. We discussed how she would feel if she were to gain a few lbs and pt reports she think she would do well with that mentally.     She states she is stressed more lately, which could explain more recent wt loss, but does not explain all the way back to last fall. She sometimes forgets to eat d/t getting busy. She reports historically having set a  "timer to eat something \"like taking medication\" and this proved to be successful for her. She has chronic nausea at baseline, but likely not playing a role w/ weight loss.     B- yogurt (Greek- key lime Chobani- shares portion w/ her 4 dogs)   Sn- watermelon, cheese sticks w/ chicken  D- steak (4 oz) w/ baked potato; grilled chicken w/ It dressing + rice and salad; white chicken lasagna   HS- popcorn, applesauce, fruit  Marjan- coffee (black), 1 can Diet Coke, 3 large bottles water   ONS- has in the past, expensive; protein powder in the past-->went well but 'too thick'   Ensure clear - expensive but tolerated ok     Viokace 20880 x 3 with snacks and 5 with meals (2130 ul/kg/meal) - same dose for a long time- just before eating or with first bite; no diarrhea no oily/greasy stools     Weight hx:   Wt Readings from Last 15 Encounters:   06/14/22 48.5 kg (107 lb)   05/22/22 49 kg (108 lb)   05/08/22 49 kg (108 lb)   03/21/22 51.4 kg (113 lb 4.8 oz)   02/24/22 49.9 kg (110 lb)   02/17/22 53.6 kg (118 lb 3.2 oz)   02/01/22 51 kg (112 lb 7 oz)   01/27/22 49 kg (108 lb)   01/18/22 51.4 kg (113 lb 6.4 oz)   12/02/21 50.8 kg (112 lb)   11/24/21 52.8 kg (116 lb 4.8 oz)   10/07/21 52.1 kg (114 lb 13.8 oz)   10/07/21 52.1 kg (114 lb 12.8 oz)   09/27/21 53 kg (116 lb 12.8 oz)   08/17/21 49.9 kg (110 lb)     Physical Activity  Volunteers at a dog rescue, puppy pen cleaning  Has handicap dog, carrying up/down stairs- 60 lbs      Nutrition Diagnosis  Unintended weight loss related to unknown, but likely multifactorial etiology (?change in insulin, BG management, forgetting to eat, chronic low appetite w/ h/o RNY) as evidenced by pt report of >20 lb weight loss x 9 months.     Nutrition Intervention  We reviewed in depth how weight loss may be related to several factors, but hard to pin down. We reviewed importance of ongoing mental health self care. We reviewed the fine line on balancing eating enough for adequate nutrition, weight " stability, etc but avoid 'binging' and overdoing the portion size, etc to the point of feeling uncomfortable.     We discussed high calorie options to include, such as butter/oil to foods as appropriate, avocado, hummus, nuts/peanut butter, full-fat Greek yogurt, etc. Consider trying a protein powder again (more cost effective than pre-made drinks) and using more liquid so it's not as thick. Reviewed how you can customize them more to add more calorie-dense items. Pt may be interested in this a few times/week, but not on a daily basis. Reviewed even making smoothies w/o protein powder and how this might be beneficial.     Reviewed enzymes. Seems to be an adequate dose and no concerns for malabsorption.     Encouraged pt to call insurance company to see what other short acting insulin they would cover. Likely the change in insulin wouldn't impact weight this much, but her A1c has increased slightly and seems coincidental on timing w/ change in insulin. We also reviewed other pieces of DM management. We reviewed that her dosing for cho in lasagna resulted in a low shortly after eating. Then she notices high BG overnight. Reviewed how based on the composition of meal (ie with a lot of fat/cheese), she may benefit from 50% insulin up front and 50% a few hours later. I told her I cannot make any formal recommendations, but from personal experience this has been valuable information to understand. We also reviewed pens vs pumps and how pumps may help with this if she would ever be interested. Download the arun 'BlueLithium' for visual carb content of foods. Reviewed low snacks that may help increase lows more quickly (applesauce or fruit over a yogurt), yet that CGM data often runs 5-10 min delayed of actual BG (so it may show BG is still dropping when it is actually recovering).     Patient Understanding: Pt verbalized understanding of education provided.  Expected Engagement: Good  Follow-Up Plans: will check in with pt in 3  weeks      Nutrition Goals  Weight stability at 104 lbs or more     Abby Medina RD, LD, CCTD

## 2022-07-01 NOTE — LETTER
7/1/2022         RE: Lynn Thompson  [address] [facility] [address]        Dear Colleague,    Thank you for referring your patient, Lynn Thompson, to the [facility]. Please see a copy of my visit note below.    Pt evaluated via billable telephone visit d/t COVID-19 restrictions. Time spent: 30 min    [facility]  Outpatient MNT     Time Spent: 30 minutes  Visit Type: Initial  Referring Physician: Margaret   Reason for RD Visit: wt loss  Pt accompanied by: self     Nutrition Assessment  Pt has h/o tp ait 2013, gastric bypass 2001   She last saw Dr Robles in Feb. She had restarted on insulin 4 years post surgery. Last A1c slightly up to 8.2 (May 2022), prev 6-7. She reports having a CGM and some highs and lows. She reports highs hours after she eats. She reports having met with some CDE in the [employer] system, but finds their recommendations have not been successful. She reports her CGM report says 70% TIR. Interestingly, she did change from Novolog to Fiasp last fall, right before she started losing weight. She said this change occurred d/t insurance stopped covering Novolog. She is unsure what other fast acting insulins her insurance covers.     She reports weight was 125-130 lbs last fall. She is now down to 104 lbs. She reports she still sees the 260+ lb person in the mirror, but noticed her skeletal appearance when her  took a photo of her. She reports no change in activity or eating since last fall that would explain her weight loss. She does report always having a fear of regaining weight w/ history gastric bypass and being overweight. She reports occasional binging behavior, where she can't stop eating until she gets overly full. Her  has to limit some food portions and put the food out of sight for her. She reports she does see someone for mental health. We discussed how she would feel if she were to gain a few lbs and pt reports she think  "she would do well with that mentally.     She states she is stressed more lately, which could explain more recent wt loss, but does not explain all the way back to last fall. She sometimes forgets to eat d/t getting busy. She reports historically having set a timer to eat something \"like taking medication\" and this proved to be successful for her. She has chronic nausea at baseline, but likely not playing a role w/ weight loss.     B- yogurt (Greek- key lime Chobani- shares portion w/ her 4 dogs)   Sn- watermelon, cheese sticks w/ chicken  D- steak (4 oz) w/ baked potato; grilled chicken w/ It dressing + rice and salad; white chicken lasagna   HS- popcorn, applesauce, fruit  Marjan- coffee (black), 1 can Diet Coke, 3 large bottles water   ONS- has in the past, expensive; protein powder in the past-->went well but 'too thick'   Ensure clear - expensive but tolerated ok     Viokace 20880 x 3 with snacks and 5 with meals (2130 ul/kg/meal) - same dose for a long time- just before eating or with first bite; no diarrhea no oily/greasy stools     Weight hx:   Wt Readings from Last 15 Encounters:   06/14/22 48.5 kg (107 lb)   05/22/22 49 kg (108 lb)   05/08/22 49 kg (108 lb)   03/21/22 51.4 kg (113 lb 4.8 oz)   02/24/22 49.9 kg (110 lb)   02/17/22 53.6 kg (118 lb 3.2 oz)   02/01/22 51 kg (112 lb 7 oz)   01/27/22 49 kg (108 lb)   01/18/22 51.4 kg (113 lb 6.4 oz)   12/02/21 50.8 kg (112 lb)   11/24/21 52.8 kg (116 lb 4.8 oz)   10/07/21 52.1 kg (114 lb 13.8 oz)   10/07/21 52.1 kg (114 lb 12.8 oz)   09/27/21 53 kg (116 lb 12.8 oz)   08/17/21 49.9 kg (110 lb)     Physical Activity  Volunteers at a dog rescue, puppy pen cleaning  Has handicap dog, carrying up/down stairs- 60 lbs      Nutrition Diagnosis  Unintended weight loss related to unknown, but likely multifactorial etiology (?change in insulin, BG management, forgetting to eat, chronic low appetite w/ h/o RNY) as evidenced by pt report of >20 lb weight loss x 9 months. "     Nutrition Intervention  We reviewed in depth how weight loss may be related to several factors, but hard to pin down. We reviewed importance of ongoing mental health self care. We reviewed the fine line on balancing eating enough for adequate nutrition, weight stability, etc but avoid 'binging' and overdoing the portion size, etc to the point of feeling uncomfortable.     We discussed high calorie options to include, such as butter/oil to foods as appropriate, avocado, hummus, nuts/peanut butter, full-fat Greek yogurt, etc. Consider trying a protein powder again (more cost effective than pre-made drinks) and using more liquid so it's not as thick. Reviewed how you can customize them more to add more calorie-dense items. Pt may be interested in this a few times/week, but not on a daily basis. Reviewed even making smoothies w/o protein powder and how this might be beneficial.     Reviewed enzymes. Seems to be an adequate dose and no concerns for malabsorption.     Encouraged pt to call insurance company to see what other short acting insulin they would cover. Likely the change in insulin wouldn't impact weight this much, but her A1c has increased slightly and seems coincidental on timing w/ change in insulin. We also reviewed other pieces of DM management. We reviewed that her dosing for cho in lasagna resulted in a low shortly after eating. Then she notices high BG overnight. Reviewed how based on the composition of meal (ie with a lot of fat/cheese), she may benefit from 50% insulin up front and 50% a few hours later. I told her I cannot make any formal recommendations, but from personal experience this has been valuable information to understand. We also reviewed pens vs pumps and how pumps may help with this if she would ever be interested. Download the arun 'Filmijob' for visual carb content of foods. Reviewed low snacks that may help increase lows more quickly (applesauce or fruit over a yogurt), yet that CGM  data often runs 5-10 min delayed of actual BG (so it may show BG is still dropping when it is actually recovering).     Patient Understanding: Pt verbalized understanding of education provided.  Expected Engagement: Good  Follow-Up Plans: will check in with pt in 3 weeks      Nutrition Goals  Weight stability at 104 lbs or more     Abby Medina RD, LD, CCTD

## 2022-07-05 ENCOUNTER — PRE VISIT (OUTPATIENT)
Dept: UROLOGY | Facility: CLINIC | Age: 57
End: 2022-07-05

## 2022-07-05 NOTE — TELEPHONE ENCOUNTER
Reason for visit: post op              Relevant information: Uterosacral ligament suspension     Records/imaging/labs/orders: all records available     Pt called: no need for a call     At Rooming: collect a urine/pvr, undress for exam    Ladan Vitale CMA  7/5/2022  8:19 AM

## 2022-07-07 ENCOUNTER — OFFICE VISIT (OUTPATIENT)
Dept: UROLOGY | Facility: CLINIC | Age: 57
End: 2022-07-07
Payer: MEDICARE

## 2022-07-07 VITALS
BODY MASS INDEX: 18.27 KG/M2 | HEART RATE: 72 BPM | WEIGHT: 107 LBS | SYSTOLIC BLOOD PRESSURE: 114 MMHG | DIASTOLIC BLOOD PRESSURE: 71 MMHG | HEIGHT: 64 IN

## 2022-07-07 DIAGNOSIS — N39.41 URGE INCONTINENCE: ICD-10-CM

## 2022-07-07 DIAGNOSIS — Z87.42 HISTORY OF UTERINE PROLAPSE: Primary | ICD-10-CM

## 2022-07-07 DIAGNOSIS — M62.89 HIGH-TONE PELVIC FLOOR DYSFUNCTION: ICD-10-CM

## 2022-07-07 DIAGNOSIS — Z98.890 H/O OF RECTOPEXY: ICD-10-CM

## 2022-07-07 DIAGNOSIS — N95.2 ATROPHIC VAGINITIS: ICD-10-CM

## 2022-07-07 DIAGNOSIS — R39.15 URINARY URGENCY: ICD-10-CM

## 2022-07-07 PROCEDURE — 51798 US URINE CAPACITY MEASURE: CPT | Performed by: UROLOGY

## 2022-07-07 PROCEDURE — 99214 OFFICE O/P EST MOD 30 MIN: CPT | Mod: 25 | Performed by: UROLOGY

## 2022-07-07 ASSESSMENT — PAIN SCALES - GENERAL: PAINLEVEL: SEVERE PAIN (7)

## 2022-07-07 NOTE — NURSING NOTE
"Chief Complaint   Patient presents with     Surgical Followup     Uterosacral ligament suspension       Blood pressure 114/71, pulse 72, height 1.626 m (5' 4\"), weight 48.5 kg (107 lb), last menstrual period 12/19/2013, not currently breastfeeding. Body mass index is 18.37 kg/m .    Patient Active Problem List   Diagnosis     Iron deficiency anemia secondary to inadequate dietary iron intake     Gastric bypass status for obesity     Health Care Home     Chronic pain syndrome     Exocrine pancreatic insufficiency     Asplenia     BLU (obstructive sleep apnea)     S/P exploratory laparotomy     Dysthymia     Anxiety     Thyroid nodule     Acquired hypothyroidism     Post-pancreatectomy diabetes (H)     Other iron deficiency anemia     Uterovaginal prolapse, unspecified     Rectal prolapse     Small bowel obstruction (H)     Acute kidney injury (H)       Allergies   Allergen Reactions     Corticosteroids Other (See Comments)     All oral, IV and injectable steroids are contraindicated (unless in life threatening situations) in [facility] [employer] transplant recipients. They can cause irreversible loss of islet cell function. Please contact patient's transplant care coordinator, Erlinda Multani RN BSN at [phone]/pager: [phone] and endocrinologist prior to administration.       Povidone Iodine Hives     Causes skin to blister     Nsaids      naprosyn = GI upset     Sulfasalazine Nausea and Nausea and Vomiting       Current Outpatient Medications   Medication Sig Dispense Refill     calcium carbonate 600 mg-vitamin D 400 units (CALTRATE) 600-400 MG-UNIT per tablet Take 1 tablet by mouth daily       DULoxetine (CYMBALTA) 20 MG capsule Take 20 mg by mouth daily       escitalopram (LEXAPRO) 20 MG tablet Take 20 mg by mouth daily       estradiol (ESTRACE) 0.1 MG/GM vaginal cream PLACE 2 GRAMS VAGINALLY 2 TIMES WEEKLY (Patient taking differently: PLACE 2 GRAMS VAGINALLY 2 TIMES WEEKLY  Thursdays and Sundays) 42.5 g 1     " famotidine (PEPCID) 40 MG tablet Take 1 tablet (40 mg) by mouth 2 times daily as needed for heartburn 180 tablet 1     FIASP PENFILL 100 UNIT/ML SOCT Inject 0.5-4 Units Subcutaneous 4 times daily (with meals and nightly) (Patient taking differently: Inject 0.5-4 Units Subcutaneous 3 times daily) 15 mL 5     gabapentin (NEURONTIN) 300 MG capsule Take 300 mg by mouth nightly as needed       Glucagon (GVOKE HYPOPEN 1-PACK) 1 MG/0.2ML SOAJ Inject 1 mg Subcutaneous once as needed (for hypoglycemia with loss of consciousness) 15 mL 5     glucose 40 % GEL Take 15-30 g by mouth every 15 minutes as needed. 1 Tube 11     hydrOXYzine (ATARAX) 25 MG tablet TAKE 1-2 TABLETS BY MOUTH 2 TIMES DAILY AS NEEDED FOR ANXIETY  0     insulin glargine (LANTUS PEN) 100 UNIT/ML pen Inject 5 Units Subcutaneous At Bedtime Inject 6 units daily 15 mL 11     insulin pen needle (32G X 4 MM) 32G X 4 MM miscellaneous Use 4-6 pen needles daily to inject subcutaneous insulin 200 each 11     levothyroxine (SYNTHROID/LEVOTHROID) 100 MCG tablet Take 1 tablet (100 mcg) by mouth daily 90 tablet 3     loratadine (CLARITIN) 10 MG tablet Take 10 mg by mouth daily as needed        modafinil (PROVIGIL) 200 MG tablet Take 400 mg by mouth daily       omeprazole (PRILOSEC) 40 MG DR capsule Take 1 capsule (40 mg) by mouth 2 times daily Take 30-60 minutes before a meal. 180 capsule 3     traZODone (DESYREL) 50 MG tablet Take 50 mg by mouth nightly as needed for sleep       VIOKACE 06607-33836 units TABS per tablet TAKE 4-5 CAPSULES BY MOUTH WITH MEALS AND 2 CAPSULES WITH SNACKS 500 tablet 0       Social History     Tobacco Use     Smoking status: Never Smoker     Smokeless tobacco: Never Used   Vaping Use     Vaping Use: Never used   Substance Use Topics     Alcohol use: Not Currently     Drug use: Not Currently       Geraldine Dawkins, KATIE  7/7/2022  8:57 AM

## 2022-07-07 NOTE — PROGRESS NOTES
"July 7, 2022    Lynn was seen today for surgical followup.    Diagnoses and all orders for this visit:    History of uterine prolapse  -     POST-VOID RESIDUAL BLADDER SCAN  -     Physical Therapy Referral; Future    Urge incontinence  -     Physical Therapy Referral; Future    Urinary urgency  -     Physical Therapy Referral; Future    H/O of rectopexy  -     Physical Therapy Referral; Future    High-tone pelvic floor dysfunction  -     Physical Therapy Referral; Future    Atrophic vaginitis       We discussed how her pelvic floor symptoms are related to the physical exam findings and her pelvic floor myofascial dysfunction.  We discussed how the recommended treatment is dedicated pelvic floor therapy.  We discussed how the pelvic floor physical therapy works and patient is agreeable.  Referral was placed.      She is instructed to resume the estrace cream    RTC 6 months, sooner if needed    10 minutes were spent today on the day of the encounter in reviewing the EMR, direct patient care including discussion of restarting the prescription estrogen cream, coordination of care and documentation    Nicole Rivera MD MPH  (she/her/hers)   of Urology  [facility]      Subjective    Here today for follow up s/p open TERI, DSO, USLS and pina colposuspension, cystoscopy 2/1/2022 .  Doing well from the pelvic floor standpoint but still has some urinary urgency and urgency incontinence.  Notes that she has some vaginal dryness but did not restart the estrogen cream.  She had to put one of her rescue dogs down since the last visit but is going to go adopt another one this week.  She denies any changes in health since last visit    /71   Pulse 72   Ht 1.626 m (5' 4\")   Wt 48.5 kg (107 lb)   LMP 12/19/2013   BMI 18.37 kg/m    GENERAL: healthy, alert and no distress  EYES: Eyes grossly normal to inspection, conjunctivae and sclerae normal  HENT: normal cephalic/atraumatic.  External " ears, nose and mouth without ulcers or lesions.  RESP: no audible wheeze, cough, or visible cyanosis.  No visible retractions or increased work of breathing.  Able to speak fully in complete sentences.  NEURO: Cranial nerves grossly intact, mentation intact and speech normal  PSYCH: mentation appears normal, affect normal/bright, judgement and insight intact, normal speech and appearance well-groomed  ABD: soft, nontender, nondistended, well healed incision without evidence of hernia  : normal external female genitalia.  Speculum and pelvic exam remarkable for high tone pelvic floor, some atrophic tissues.  There is no evidence of mesh or suture extrusion or other abnormality.    CC  Patient Care Team:  Marynaa Brooks MD as PCP - General (Family Practice)  Ana Davis MD as MD (Pulmonary Disease)  Bartolome Disla MD as MD (Neurology)  Rina Watt Amanda L, NP as Nurse Practitioner (Nurse Practitioner Psych/Mental Health)  Adriana Robles MD as Assigned Pediatric Specialist Provider  Maryana Brooks MD as Assigned PCP  Kavitha Spencer DO as Assigned OBGYN Provider  Sabrina Rutledge APRN CNP as Nurse Practitioner (Colon & Rectal)  Liliya Urrutia MD as Assigned Cancer Care Provider  Nicole Rivera MD as MD (Urology)  Nicole Rivera MD as MD (Urology)  Charmaine Navarro, RN as Specialty Care Coordinator (Urology)  Charmaine Navarro, RN as Specialty Care Coordinator (Urology)  Maryana Brooks MD as Referring Physician (Family Medicine)  Nicole Rivera MD as Assigned Surgical Provider  SELF, REFERRED

## 2022-07-07 NOTE — LETTER
7/7/2022       RE: [facility] Thompson  [id number] [facility] [id number]     Dear Colleague,    Thank you for referring your patient, Lynn Thompson, to the [facility] [place] at [facility]. Please see a copy of my visit note below.    July 7, 2022    Lynn was seen today for surgical followup.    Diagnoses and all orders for this visit:    History of uterine prolapse  -     POST-VOID RESIDUAL BLADDER SCAN  -     Physical Therapy Referral; Future    Urge incontinence  -     Physical Therapy Referral; Future    Urinary urgency  -     Physical Therapy Referral; Future    H/O of rectopexy  -     Physical Therapy Referral; Future    High-tone pelvic floor dysfunction  -     Physical Therapy Referral; Future    Atrophic vaginitis       We discussed how her pelvic floor symptoms are related to the physical exam findings and her pelvic floor myofascial dysfunction.  We discussed how the recommended treatment is dedicated pelvic floor therapy.  We discussed how the pelvic floor physical therapy works and patient is agreeable.  Referral was placed.      She is instructed to resume the estrace cream    RTC 6 months, sooner if needed    10 minutes were spent today on the day of the encounter in reviewing the EMR, direct patient care including discussion of restarting the prescription estrogen cream, coordination of care and documentation    Nicole Rivera MD MPH  (she/her/hers)   of Urology  [facility]      Subjective    Here today for follow up s/p open TERI, DSO, USLS and pina colposuspension, cystoscopy 2/1/2022 .  Doing well from the pelvic floor standpoint but still has some urinary urgency and urgency incontinence.  Notes that she has some vaginal dryness but did not restart the estrogen cream.  She had to put one of her rescue dogs down since the last visit but is going to go adopt another one this week.   "She denies any changes in health since last visit    /71   Pulse 72   Ht 1.626 m (5' 4\")   Wt 48.5 kg (107 lb)   LMP 12/19/2013   BMI 18.37 kg/m    GENERAL: healthy, alert and no distress  EYES: Eyes grossly normal to inspection, conjunctivae and sclerae normal  HENT: normal cephalic/atraumatic.  External ears, nose and mouth without ulcers or lesions.  RESP: no audible wheeze, cough, or visible cyanosis.  No visible retractions or increased work of breathing.  Able to speak fully in complete sentences.  NEURO: Cranial nerves grossly intact, mentation intact and speech normal  PSYCH: mentation appears normal, affect normal/bright, judgement and insight intact, normal speech and appearance well-groomed  ABD: soft, nontender, nondistended, well healed incision without evidence of hernia  : normal external female genitalia.  Speculum and pelvic exam remarkable for high tone pelvic floor, some atrophic tissues.  There is no evidence of mesh or suture extrusion or other abnormality.    CC  Patient Care Team:  Maryana Brooks MD as PCP - General (Family Practice)  Ana Davis MD as MD (Pulmonary Disease)  Bartolome Disla MD as MD (Neurology)  Rina Watt  RenardDomonique burciaga, NP as Nurse Practitioner (Nurse Practitioner Psych/Mental Health)  Adriana Robles MD as Assigned Pediatric Specialist Provider  Maryana Brooks MD as Assigned PCP  Kavitha Spencer DO as Assigned OBGYN Provider  Sabrina Rutledge APRN CNP as Nurse Practitioner (Colon & Rectal)  Liliya Urrutia MD as Assigned Cancer Care Provider  Nicole Rivera MD as MD (Urology)  Nicole Rivera MD as MD (Urology)  Charmaine Navarro, RN as Specialty Care Coordinator (Urology)  Charmaine Navarro, RN as Specialty Care Coordinator (Urology)  Maryana Brooks MD as Referring Physician (Family Medicine)  Nicole Rivera MD as Assigned Surgical Provider  SELF, REFERRED    "

## 2022-07-07 NOTE — PATIENT INSTRUCTIONS
Restart the estrace cream    Websites with free information:    [employer] patient website: www.voicesforpfd.org    Total Control Program: www.totalcontrolprogram.com    Please see one of the dedicated pelvic floor physical therapist ([facility] [phone])    Please return to see me in 6 months, sooner if needed    It was a pleasure meeting with you today.  Thank you for allowing me and my team the privilege of caring for you today.  YOU are the reason we are here, and I truly hope we provided you with the excellent service you deserve.  Please let us know if there is anything else we can do for you so that we can be sure you are leaving completely satisfied with your care experience.

## 2022-07-11 ENCOUNTER — IMMUNIZATION (OUTPATIENT)
Dept: NURSING | Facility: CLINIC | Age: 57
End: 2022-07-11
Payer: MEDICARE

## 2022-07-11 PROCEDURE — 91305 COVID-19,PF,PFIZER (12+ YRS): CPT

## 2022-07-11 PROCEDURE — 0054A COVID-19,PF,PFIZER (12+ YRS): CPT

## 2022-07-22 ENCOUNTER — TELEPHONE (OUTPATIENT)
Dept: TRANSPLANT | Facility: CLINIC | Age: 57
End: 2022-07-22

## 2022-07-22 NOTE — TELEPHONE ENCOUNTER
Called Lynn and left  and also sent My chart message to check in with her since our conversation a few weeks ago. Will remain available for support.

## 2022-07-27 ENCOUNTER — THERAPY VISIT (OUTPATIENT)
Dept: PHYSICAL THERAPY | Facility: CLINIC | Age: 57
End: 2022-07-27
Payer: MEDICARE

## 2022-07-27 DIAGNOSIS — N39.41 URGE INCONTINENCE: ICD-10-CM

## 2022-07-27 DIAGNOSIS — M62.89 HIGH-TONE PELVIC FLOOR DYSFUNCTION: ICD-10-CM

## 2022-07-27 DIAGNOSIS — M62.89 PELVIC FLOOR DYSFUNCTION: ICD-10-CM

## 2022-07-27 DIAGNOSIS — Z87.42 HISTORY OF UTERINE PROLAPSE: ICD-10-CM

## 2022-07-27 DIAGNOSIS — Z98.890 H/O OF RECTOPEXY: ICD-10-CM

## 2022-07-27 DIAGNOSIS — R39.15 URINARY URGENCY: ICD-10-CM

## 2022-07-27 PROCEDURE — 97161 PT EVAL LOW COMPLEX 20 MIN: CPT | Mod: GP | Performed by: PHYSICAL THERAPIST

## 2022-07-27 PROCEDURE — 97535 SELF CARE MNGMENT TRAINING: CPT | Mod: GP | Performed by: PHYSICAL THERAPIST

## 2022-07-27 PROCEDURE — 97140 MANUAL THERAPY 1/> REGIONS: CPT | Mod: GP | Performed by: PHYSICAL THERAPIST

## 2022-07-27 NOTE — PROGRESS NOTES
Physical Therapy Initial Evaluation  Subjective:  SUBJECTIVE:    Patient reports onset of symptoms after surgery (recoplexy) in February 2022.   Symptoms include urinary and fecal incontinence, dysparunia (pain that lasts up to the next day), pain with bladder or rectal filling, pain with defecation and urination. Pt reports she could hold her bladder for very long periods of time in past.    Since onset symptoms have been getting better, worse or staying the same? Getting worse      Urination:  Do you leak on the way to the bathroom or with a strong urge to void? Yes    Do you leak with cough,sneeze, jumping, running? Yes   Any other activities that cause leaking? Yes, lifting dog 60lbs   Do you have triggers that make you feel you can't wait to go to the bathroom? Yes , urgency and pain   Type of pad and number used per day? 1 at night or if out of the house   When you leak what is the amount? Medium   How often do you typically void? Every 3 hours     How long can you delay the need to urinate? 1 - 2 minutes.   How many times do you get up to urinate at night? 2 (not new for her)   Can you stop the flow of urine when on the toilet? Yes  Is the volume of urine passed usually: average. (8sec rule=  250ml with average bladder storing  400-600ml)    Do you strain to pass urine? No  Do you have a slow or hesitant urinary stream? No  Do you have difficulty initiating the urine stream? No    How many bladder infections have you had in last 12 months? 3    Fluid intake (one glass is 8oz or one cup) 3-4 24 ounces glasses/day  16 ounces coffee, 1 can of soda caffinated glasses/day  Very rarely 1-2x per year  alcohol glasses    Bowel habits:  Frequency of bowel movements? 3/4 per day, has had to use miralax for constipation. Pt has chrons disease, worried about her surgery getting ruined. Now using miralax daily. Does colon massage in order to have bowel movements. Sometimes anal sphincter feels painful and raw.    Consistancy of stool? Soft, bristol scale 5-6 daily, doesn't have pancreas or spleen   Do you ignore the urge to defecate? No  Do you strain to pass stool? No    Pelvic Pain:  When do you have pelvic pain? During and after intercourse, during and after voiding or defecating   Are you sexually active? Yes  Is initial penetration during intercourse painful? Yes  Is deeper penetration painful? Yes  Do you use lubricant? Yes       Marinoff Scale: 2   (Level 3: Abstinence from intercourse because of severe pain. Level 2: Painful intercourse which limites frequency of activity. Level 1: Painful intercourse not severe enough to prevent activity.)    Birth History:   Given birth: Yes   Complications: prolonged pushing with forceps and vacuum, resulted in .  Second birth went well without complications    Vaginal deliveries:  1  C-sections:  1  Episiotomies: 2  Have you ever been worried for your physical safety? No   History of abdominal or pelvic surgeries? Yes (hysterectomy, gallbladder, pancreas, rectoplexy)     Regular exercise: yes, active with dogs, volunteers at the [employer]. Doesn't do a lot of exercise as she reports she is malnourished (recently had a lot of weight loss)    Practiced the PF(kegel) exercises for 4 or more weeks? No     GOALS: improve sexual activity with her             The history is provided by the patient.   Patient Health History  Lynn Thompson being seen for Bladder leakage.          Pain is reported as 2/10 on pain scale.  General health as reported by patient is good.  Pertinent medical history includes: anemia, changes in bowel/bladder, depression, diabetes, migraines/headaches, sleep disorder/apnea, thyroid problems and unexplained weight loss.     Medical allergies: none.   Surgeries include:  Other. Other surgery history details: Hysterectomy and prolapse repair.    Current medications:  Anti-depressants, sleep medication and thyroid medication.    Current occupation  is None.   Primary job tasks include:  Prolonged sitting.                                    Objective:  System                                 Pelvic Dysfunction Evaluation:            Pelvic Clock Exam:    Ischiocavernosis pain:  -  Bulbocavernosis pain:  -  Transverse Perineal:  -  Levator ANI:  +        External Assessment:  normal  Skin Condition:  Normal    Bearing Down/Coughing:  Normal  Tissue Symmetry:  Normal  Introitus:  Normal  Muscle Contraction/Perineal Mobility:  Slight lift, no urogential triangle descent  Internal Assessment:    Sensory Exam:  Normal  Contraction/Grade:  Fair squeeze, definite lift (3)                               General     ROS    Assessment/Plan:    Patient is a 57 year old female with pelvic complaints.    Patient has the following significant findings with corresponding treatment plan.                Diagnosis 1:  Pelvic floor dysfunction  Pain -  self management, education and home program  Decreased ROM/flexibility - manual therapy, therapeutic exercise and home program  Decreased strength - therapeutic exercise, therapeutic activities and home program  Impaired muscle performance - neuro re-education and home program  Decreased function - therapeutic activities and home program  Impaired posture - neuro re-education and home program    Cumulative Therapy Evaluation is: Low complexity.    Previous and current functional limitations:  (See Goal Flow Sheet for this information)    Short term and Long term goals: (See Goal Flow Sheet for this information)     Communication ability:  Patient appears to be able to clearly communicate and understand verbal and written communication and follow directions correctly.  Treatment Explanation - The following has been discussed with the patient:   RX ordered/plan of care  Anticipated outcomes  Possible risks and side effects  This patient would benefit from PT intervention to resume normal activities.   Rehab potential is  good.    Frequency:  1 X week, once daily  Duration:  for 8 weeks  Discharge Plan:  Achieve all LTG.  Independent in home treatment program.  Reach maximal therapeutic benefit.    Please refer to the daily flowsheet for treatment today, total treatment time and time spent performing 1:1 timed codes.

## 2022-07-27 NOTE — PROGRESS NOTES
[facility]    OUTPATIENT Physical Therapy ORTHOPEDIC EVALUATION  PLAN OF TREATMENT FOR OUTPATIENT REHABILITATION  (COMPLETE FOR INITIAL CLAIMS ONLY)  Patient's Last Name, First Name, M.I.  YOB: 1965  Lynn Thompson    Provider s Name:  [facility]   Medical Record No.  [id number]   Start of Care Date:  07/27/22   Onset Date:   07/07/22 (MD visit)   Type:     _X__PT   ___OT Medical Diagnosis:    Encounter Diagnoses   Name Primary?    History of uterine prolapse     Urge incontinence     Urinary urgency     H/O of rectopexy     High-tone pelvic floor dysfunction     Pelvic floor dysfunction         Treatment Diagnosis:  dysparunia, mixed UI and fecal incontinence        Goals:     07/27/22 0700   Urinary Leakage   Previous Functional Level No problems   Current Functional Level Number of urinary leakage episodes in a day   Performance Level 1   STG Target Performance Decrease urinary leakage episodes in a week to   Performance Level 1   Rationale continence throughout the day   Due Date 08/24/22   LTG Target Performance Decrease urinary leakage episodes in a month to   Performance Level 1   Rationale continence throughout the day   Due Date 09/21/22   Pelvic Pain   Previous Functional Level No restrictions   Current Functional Level Decreased frequency of intercourse due to pain   STG Target Performance Decrease pelvic pain to allow usage of dilator   Rationale painfree intercourse   Due Date 08/24/22   LTG Target Performance Decrease level of pelvic pain with intercourse to   Performance Level 1/10   Rationale painfree intercourse   Due Date 09/21/22   Fecal Incontinence   Previous Functional Level No issues   Current Functional Level Incontinence with  (nighttime)   Performance Level once per month   STG Target Performance Decrease number of episodes to    Performance Level 0 in two months   Rationale continence throughout the night;for healthy hygiene and to prevent skin breakdown   Due Date 09/21/22       Therapy Frequency:  once per week  Predicted Duration of Therapy Intervention:  8 weeks    Claudia Queen, PT                 I CERTIFY THE NEED FOR THESE SERVICES FURNISHED UNDER        THIS PLAN OF TREATMENT AND WHILE UNDER MY CARE     (Physician co-signature of this document indicates review and certification of the therapy plan).                     Certification Date From:  07/27/22   Certification Date To:  10/24/22    Referring Provider:  Nicole Rivera    Initial Assessment        See Epic Evaluation SOC Date: 07/27/22

## 2022-08-05 ENCOUNTER — THERAPY VISIT (OUTPATIENT)
Dept: PHYSICAL THERAPY | Facility: CLINIC | Age: 57
End: 2022-08-05
Payer: MEDICARE

## 2022-08-05 DIAGNOSIS — M62.89 PELVIC FLOOR DYSFUNCTION: Primary | ICD-10-CM

## 2022-08-05 PROCEDURE — 97110 THERAPEUTIC EXERCISES: CPT | Mod: GP | Performed by: PHYSICAL THERAPIST

## 2022-08-05 PROCEDURE — 97140 MANUAL THERAPY 1/> REGIONS: CPT | Mod: GP | Performed by: PHYSICAL THERAPIST

## 2022-08-09 DIAGNOSIS — E03.9 ACQUIRED HYPOTHYROIDISM: ICD-10-CM

## 2022-08-09 RX ORDER — LEVOTHYROXINE SODIUM 100 UG/1
100 TABLET ORAL DAILY
Qty: 90 TABLET | Refills: 0 | Status: SHIPPED | OUTPATIENT
Start: 2022-08-09 | End: 2022-12-13

## 2022-08-09 NOTE — TELEPHONE ENCOUNTER
Nakita from [facility] calling to check status of refill request for Levothyroxine. Advised I do not see a request for that refill. Pended up request and marked as high priority as patient is out of medication.  Geraldine SHEPARD RN on 8/9/2022 at 2:30 PM

## 2022-08-09 NOTE — TELEPHONE ENCOUNTER
Prescription approved per [facility] Refill Protocol.    Andrea ARROYO RN

## 2022-08-11 ENCOUNTER — THERAPY VISIT (OUTPATIENT)
Dept: PHYSICAL THERAPY | Facility: CLINIC | Age: 57
End: 2022-08-11
Payer: MEDICARE

## 2022-08-11 DIAGNOSIS — M62.89 PELVIC FLOOR DYSFUNCTION: Primary | ICD-10-CM

## 2022-08-11 PROCEDURE — 97140 MANUAL THERAPY 1/> REGIONS: CPT | Mod: GP | Performed by: PHYSICAL THERAPIST

## 2022-08-11 PROCEDURE — 97110 THERAPEUTIC EXERCISES: CPT | Mod: GP | Performed by: PHYSICAL THERAPIST

## 2022-08-26 ENCOUNTER — THERAPY VISIT (OUTPATIENT)
Dept: PHYSICAL THERAPY | Facility: CLINIC | Age: 57
End: 2022-08-26
Attending: UROLOGY
Payer: MEDICARE

## 2022-08-26 DIAGNOSIS — M62.89 PELVIC FLOOR DYSFUNCTION: Primary | ICD-10-CM

## 2022-08-26 PROCEDURE — 97140 MANUAL THERAPY 1/> REGIONS: CPT | Mod: GP | Performed by: PHYSICAL THERAPIST

## 2022-08-26 PROCEDURE — 97535 SELF CARE MNGMENT TRAINING: CPT | Mod: GP | Performed by: PHYSICAL THERAPIST

## 2022-08-31 ENCOUNTER — THERAPY VISIT (OUTPATIENT)
Dept: PHYSICAL THERAPY | Facility: CLINIC | Age: 57
End: 2022-08-31
Payer: MEDICARE

## 2022-08-31 DIAGNOSIS — M62.89 PELVIC FLOOR DYSFUNCTION: Primary | ICD-10-CM

## 2022-08-31 PROCEDURE — 97110 THERAPEUTIC EXERCISES: CPT | Mod: GP | Performed by: PHYSICAL THERAPIST

## 2022-08-31 PROCEDURE — 97535 SELF CARE MNGMENT TRAINING: CPT | Mod: GP | Performed by: PHYSICAL THERAPIST

## 2022-08-31 PROCEDURE — 97140 MANUAL THERAPY 1/> REGIONS: CPT | Mod: GP | Performed by: PHYSICAL THERAPIST

## 2022-09-06 ENCOUNTER — LAB (OUTPATIENT)
Dept: ONCOLOGY | Facility: CLINIC | Age: 57
End: 2022-09-06
Attending: INTERNAL MEDICINE
Payer: COMMERCIAL

## 2022-09-06 DIAGNOSIS — D50.8 OTHER IRON DEFICIENCY ANEMIA: ICD-10-CM

## 2022-09-06 LAB
BASOPHILS # BLD AUTO: 0.1 10E3/UL (ref 0–0.2)
BASOPHILS NFR BLD AUTO: 1 %
EOSINOPHIL # BLD AUTO: 0.2 10E3/UL (ref 0–0.7)
EOSINOPHIL NFR BLD AUTO: 3 %
ERYTHROCYTE [DISTWIDTH] IN BLOOD BY AUTOMATED COUNT: 13.7 % (ref 10–15)
FERRITIN SERPL-MCNC: 136 NG/ML (ref 11–328)
HCT VFR BLD AUTO: 41.2 % (ref 35–47)
HGB BLD-MCNC: 12.5 G/DL (ref 11.7–15.7)
IMM GRANULOCYTES # BLD: 0 10E3/UL
IMM GRANULOCYTES NFR BLD: 0 %
IRON BINDING CAPACITY (ROCHE): 255 UG/DL (ref 240–430)
IRON SATN MFR SERPL: 27 % (ref 15–46)
IRON SERPL-MCNC: 69 UG/DL (ref 37–145)
LYMPHOCYTES # BLD AUTO: 2.2 10E3/UL (ref 0.8–5.3)
LYMPHOCYTES NFR BLD AUTO: 25 %
MCH RBC QN AUTO: 29.8 PG (ref 26.5–33)
MCHC RBC AUTO-ENTMCNC: 30.3 G/DL (ref 31.5–36.5)
MCV RBC AUTO: 98 FL (ref 78–100)
MONOCYTES # BLD AUTO: 0.8 10E3/UL (ref 0–1.3)
MONOCYTES NFR BLD AUTO: 9 %
NEUTROPHILS # BLD AUTO: 5.3 10E3/UL (ref 1.6–8.3)
NEUTROPHILS NFR BLD AUTO: 62 %
NRBC # BLD AUTO: 0 10E3/UL
NRBC BLD AUTO-RTO: 0 /100
PLATELET # BLD AUTO: 353 10E3/UL (ref 150–450)
RBC # BLD AUTO: 4.19 10E6/UL (ref 3.8–5.2)
WBC # BLD AUTO: 8.6 10E3/UL (ref 4–11)

## 2022-09-06 PROCEDURE — 36415 COLL VENOUS BLD VENIPUNCTURE: CPT

## 2022-09-06 PROCEDURE — 82728 ASSAY OF FERRITIN: CPT | Performed by: INTERNAL MEDICINE

## 2022-09-06 PROCEDURE — 85025 COMPLETE CBC W/AUTO DIFF WBC: CPT | Performed by: INTERNAL MEDICINE

## 2022-09-06 PROCEDURE — 83550 IRON BINDING TEST: CPT | Performed by: INTERNAL MEDICINE

## 2022-09-06 NOTE — PROGRESS NOTES
Medical Assistant Note:  Lynn Thompson presents today for blood draw.    Patient seen by provider today: No.   present during visit today: Not Applicable.    Concerns: No Concerns.    Procedure:  Labs drawn    Post Assessment:  Labs drawn without difficulty: Yes.    Discharge Plan:  Departure Mode: Ambulatory.    Face to Face Time: 10 min.    Sabrina Daily CMA

## 2022-09-07 ENCOUNTER — THERAPY VISIT (OUTPATIENT)
Dept: PHYSICAL THERAPY | Facility: CLINIC | Age: 57
End: 2022-09-07
Payer: MEDICARE

## 2022-09-07 DIAGNOSIS — M62.89 PELVIC FLOOR DYSFUNCTION: Primary | ICD-10-CM

## 2022-09-07 PROCEDURE — 97110 THERAPEUTIC EXERCISES: CPT | Mod: GP | Performed by: PHYSICAL THERAPIST

## 2022-09-07 PROCEDURE — 97140 MANUAL THERAPY 1/> REGIONS: CPT | Mod: GP | Performed by: PHYSICAL THERAPIST

## 2022-09-08 DIAGNOSIS — K86.81 EXOCRINE PANCREATIC INSUFFICIENCY: ICD-10-CM

## 2022-09-08 DIAGNOSIS — E13.9 POST-PANCREATECTOMY DIABETES (H): Primary | ICD-10-CM

## 2022-09-08 DIAGNOSIS — Z98.84 GASTRIC BYPASS STATUS FOR OBESITY: ICD-10-CM

## 2022-09-08 DIAGNOSIS — E89.1 POST-PANCREATECTOMY DIABETES (H): Primary | ICD-10-CM

## 2022-09-08 DIAGNOSIS — E03.9 ACQUIRED HYPOTHYROIDISM: ICD-10-CM

## 2022-09-08 DIAGNOSIS — K90.9 INTESTINAL MALABSORPTION, UNSPECIFIED: ICD-10-CM

## 2022-09-08 DIAGNOSIS — Z00.00 HEALTH MAINTENANCE EXAMINATION: ICD-10-CM

## 2022-09-08 DIAGNOSIS — F34.1 DYSTHYMIA: ICD-10-CM

## 2022-09-08 DIAGNOSIS — Z90.410 POST-PANCREATECTOMY DIABETES (H): Primary | ICD-10-CM

## 2022-09-15 ENCOUNTER — ALLIED HEALTH/NURSE VISIT (OUTPATIENT)
Dept: TRANSPLANT | Facility: CLINIC | Age: 57
End: 2022-09-15
Attending: PEDIATRICS
Payer: MEDICARE

## 2022-09-15 ENCOUNTER — LAB (OUTPATIENT)
Dept: LAB | Facility: CLINIC | Age: 57
End: 2022-09-15
Payer: MEDICARE

## 2022-09-15 ENCOUNTER — NURSE TRIAGE (OUTPATIENT)
Dept: NURSING | Facility: CLINIC | Age: 57
End: 2022-09-15

## 2022-09-15 VITALS
OXYGEN SATURATION: 97 % | DIASTOLIC BLOOD PRESSURE: 82 MMHG | BODY MASS INDEX: 18.88 KG/M2 | WEIGHT: 110 LBS | SYSTOLIC BLOOD PRESSURE: 122 MMHG | HEART RATE: 85 BPM

## 2022-09-15 DIAGNOSIS — K90.9 INTESTINAL MALABSORPTION, UNSPECIFIED: ICD-10-CM

## 2022-09-15 DIAGNOSIS — Z90.410 POST-PANCREATECTOMY DIABETES (H): ICD-10-CM

## 2022-09-15 DIAGNOSIS — E13.9 POST-PANCREATECTOMY DIABETES (H): ICD-10-CM

## 2022-09-15 DIAGNOSIS — E89.1 POST-PANCREATECTOMY DIABETES (H): ICD-10-CM

## 2022-09-15 DIAGNOSIS — K86.81 EXOCRINE PANCREATIC INSUFFICIENCY: ICD-10-CM

## 2022-09-15 DIAGNOSIS — E10.9 TYPE 1 DIABETES MELLITUS WITHOUT COMPLICATION (H): Primary | ICD-10-CM

## 2022-09-15 DIAGNOSIS — Z90.410 POST-PANCREATECTOMY DIABETES (H): Primary | ICD-10-CM

## 2022-09-15 DIAGNOSIS — E13.9 POST-PANCREATECTOMY DIABETES (H): Primary | ICD-10-CM

## 2022-09-15 DIAGNOSIS — Z01.818 PRE-TRANSPLANT EVALUATION FOR KIDNEY TRANSPLANT: Primary | ICD-10-CM

## 2022-09-15 DIAGNOSIS — E89.1 POST-PANCREATECTOMY DIABETES (H): Primary | ICD-10-CM

## 2022-09-15 DIAGNOSIS — K86.89 PANCREATIC INSUFFICIENCY: ICD-10-CM

## 2022-09-15 DIAGNOSIS — Z90.410 ACQUIRED ABSENCE OF PANCREAS: ICD-10-CM

## 2022-09-15 LAB
ALBUMIN SERPL BCG-MCNC: 4.3 G/DL (ref 3.5–5.2)
ALP SERPL-CCNC: 151 U/L (ref 35–104)
ALT SERPL W P-5'-P-CCNC: 61 U/L (ref 10–35)
ANION GAP SERPL CALCULATED.3IONS-SCNC: 11 MMOL/L (ref 7–15)
AST SERPL W P-5'-P-CCNC: 46 U/L (ref 10–35)
BASOPHILS # BLD AUTO: 0.1 10E3/UL (ref 0–0.2)
BASOPHILS NFR BLD AUTO: 1 %
BILIRUB SERPL-MCNC: 0.2 MG/DL
BUN SERPL-MCNC: 21.2 MG/DL (ref 8–23)
C PEPTIDE SERPL-MCNC: 0.3 NG/ML (ref 0.9–6.9)
CALCIUM SERPL-MCNC: 9.9 MG/DL (ref 8.6–10)
CHLORIDE SERPL-SCNC: 101 MMOL/L (ref 98–107)
CHOLEST SERPL-MCNC: 246 MG/DL
CREAT SERPL-MCNC: 0.83 MG/DL (ref 0.51–0.95)
CREAT UR-MCNC: 95.2 MG/DL
DEPRECATED HCO3 PLAS-SCNC: 30 MMOL/L (ref 22–29)
EOSINOPHIL # BLD AUTO: 0.2 10E3/UL (ref 0–0.7)
EOSINOPHIL NFR BLD AUTO: 2 %
ERYTHROCYTE [DISTWIDTH] IN BLOOD BY AUTOMATED COUNT: 13.2 % (ref 10–15)
FASTING STATUS PATIENT QL REPORTED: ABNORMAL
FASTING STATUS PATIENT QL REPORTED: NO
FASTING STATUS PATIENT QL REPORTED: NO
FERRITIN SERPL-MCNC: 172 NG/ML (ref 11–328)
GFR SERPL CREATININE-BSD FRML MDRD: 81 ML/MIN/1.73M2
GLUCOSE SERPL-MCNC: 170 MG/DL (ref 70–99)
GLUCOSE SERPL-MCNC: 170 MG/DL (ref 70–99)
GLUCOSE SERPL-MCNC: 297 MG/DL (ref 70–99)
GLUCOSE SERPL-MCNC: 355 MG/DL (ref 70–99)
HBA1C MFR BLD: 7.7 %
HCT VFR BLD AUTO: 41.8 % (ref 35–47)
HDLC SERPL-MCNC: 97 MG/DL
HGB BLD-MCNC: 13 G/DL (ref 11.7–15.7)
IMM GRANULOCYTES # BLD: 0 10E3/UL
IMM GRANULOCYTES NFR BLD: 0 %
IRON BINDING CAPACITY (ROCHE): 283 UG/DL (ref 240–430)
IRON SATN MFR SERPL: 20 % (ref 15–46)
IRON SERPL-MCNC: 56 UG/DL (ref 37–145)
LDLC SERPL CALC-MCNC: 122 MG/DL
LYMPHOCYTES # BLD AUTO: 1.9 10E3/UL (ref 0.8–5.3)
LYMPHOCYTES NFR BLD AUTO: 23 %
MCH RBC QN AUTO: 29.9 PG (ref 26.5–33)
MCHC RBC AUTO-ENTMCNC: 31.1 G/DL (ref 31.5–36.5)
MCV RBC AUTO: 96 FL (ref 78–100)
MICROALBUMIN UR-MCNC: 62.8 MG/L
MICROALBUMIN/CREAT UR: 65.97 MG/G CR (ref 0–25)
MONOCYTES # BLD AUTO: 0.7 10E3/UL (ref 0–1.3)
MONOCYTES NFR BLD AUTO: 8 %
NEUTROPHILS # BLD AUTO: 5.5 10E3/UL (ref 1.6–8.3)
NEUTROPHILS NFR BLD AUTO: 66 %
NONHDLC SERPL-MCNC: 149 MG/DL
NRBC # BLD AUTO: 0 10E3/UL
NRBC BLD AUTO-RTO: 0 /100
PLATELET # BLD AUTO: 432 10E3/UL (ref 150–450)
POTASSIUM SERPL-SCNC: 4.4 MMOL/L (ref 3.4–5.3)
PROT SERPL-MCNC: 7.1 G/DL (ref 6.4–8.3)
RBC # BLD AUTO: 4.35 10E6/UL (ref 3.8–5.2)
SODIUM SERPL-SCNC: 142 MMOL/L (ref 136–145)
TRIGL SERPL-MCNC: 134 MG/DL
VIT B12 SERPL-MCNC: 418 PG/ML (ref 232–1245)
WBC # BLD AUTO: 8.3 10E3/UL (ref 4–11)

## 2022-09-15 PROCEDURE — G0463 HOSPITAL OUTPT CLINIC VISIT: HCPCS | Performed by: PEDIATRICS

## 2022-09-15 PROCEDURE — 99000 SPECIMEN HANDLING OFFICE-LAB: CPT | Performed by: PATHOLOGY

## 2022-09-15 PROCEDURE — 99215 OFFICE O/P EST HI 40 MIN: CPT | Performed by: PEDIATRICS

## 2022-09-15 PROCEDURE — 82607 VITAMIN B-12: CPT | Performed by: PEDIATRICS

## 2022-09-15 PROCEDURE — 82728 ASSAY OF FERRITIN: CPT | Performed by: PEDIATRICS

## 2022-09-15 PROCEDURE — 82043 UR ALBUMIN QUANTITATIVE: CPT | Performed by: FAMILY MEDICINE

## 2022-09-15 PROCEDURE — 84134 ASSAY OF PREALBUMIN: CPT | Performed by: PEDIATRICS

## 2022-09-15 PROCEDURE — 83540 ASSAY OF IRON: CPT | Performed by: PATHOLOGY

## 2022-09-15 PROCEDURE — 84630 ASSAY OF ZINC: CPT | Mod: 90 | Performed by: PATHOLOGY

## 2022-09-15 PROCEDURE — 80053 COMPREHEN METABOLIC PANEL: CPT | Performed by: PATHOLOGY

## 2022-09-15 PROCEDURE — 82747 ASSAY OF FOLIC ACID RBC: CPT | Mod: 90 | Performed by: PATHOLOGY

## 2022-09-15 PROCEDURE — 85025 COMPLETE CBC W/AUTO DIFF WBC: CPT | Performed by: PATHOLOGY

## 2022-09-15 PROCEDURE — 84446 ASSAY OF VITAMIN E: CPT | Mod: 90 | Performed by: PATHOLOGY

## 2022-09-15 PROCEDURE — 83036 HEMOGLOBIN GLYCOSYLATED A1C: CPT | Performed by: PEDIATRICS

## 2022-09-15 PROCEDURE — 84681 ASSAY OF C-PEPTIDE: CPT | Performed by: PEDIATRICS

## 2022-09-15 PROCEDURE — 84590 ASSAY OF VITAMIN A: CPT | Mod: 90 | Performed by: PATHOLOGY

## 2022-09-15 PROCEDURE — 80061 LIPID PANEL: CPT | Performed by: PEDIATRICS

## 2022-09-15 PROCEDURE — 83550 IRON BINDING TEST: CPT | Performed by: PATHOLOGY

## 2022-09-15 PROCEDURE — 36415 COLL VENOUS BLD VENIPUNCTURE: CPT | Performed by: PATHOLOGY

## 2022-09-15 PROCEDURE — 82306 VITAMIN D 25 HYDROXY: CPT | Performed by: PEDIATRICS

## 2022-09-15 PROCEDURE — 82542 COL CHROMOTOGRAPHY QUAL/QUAN: CPT | Mod: 90 | Performed by: PATHOLOGY

## 2022-09-15 NOTE — TELEPHONE ENCOUNTER
Scheduled for a boost test at 9:30 a.m. at the [facility]. Supposed to fast. Low blood sugar:  64 , 40's. Her blood glucose alarm kept going off. She can't tell when she's low. She tried one mint. She will drink 1/2 cup of juice. I told her to call back if she doesn't come up with a higher blood sugar within 30 minutes. She understands and will do that. She stated she is not confused.  Arielle Barker RN  [facility]      Reason for Disposition    [1] Low blood sugar symptoms with no other adult present AND [2] hasn't tried Care Advice    Additional Information    Negative: Unconscious or difficult to awaken    Negative: Seizure occurs    Negative: Acting confused (e.g., disoriented, slurred speech)    Negative: Very weak (e.g., can't stand)    Negative: Sounds like a life-threatening emergency to the triager    Negative: [1] Vomiting AND [2] signs of dehydration (e.g., very dry mouth, lightheaded, dark urine)    Negative: [1] Low blood sugar symptoms persist > 30 minutes AND [2] using low blood sugar Care Advice    Negative: [1] Low blood glucose (< 70 mg/dL  or 3.9 mmol/L) persists > 30 minutes AND [2] using low blood sugar Care Advice    Negative: Patient sounds very sick or weak to the triager    Protocols used: [facility]

## 2022-09-15 NOTE — NURSING NOTE
Chief Complaint   Patient presents with     Follow Up     AUTO ISLET     Blood pressure 122/82, pulse 85, weight 49.9 kg (110 lb), last menstrual period 12/19/2013, SpO2 97 %, not currently breastfeeding.    Елена Kumar, CMA

## 2022-09-15 NOTE — PATIENT INSTRUCTIONS
1)  Short-term plan:  -- Keep the same doses BUT add the carb coverage in the evening for after dinner snacks.  -- Try to eat the carbs in a short amount of time and then add non-carb if you are continuing to munch (meat, juice)    2)  Long-term- let's set you up with diabetes education to start the process of getting you onto an insulin pump (omnipod 5, Tslim Control IQ) that talks to the Dexcom.  It won't necessarily make it less work for you but it will give you more tools to get better control!  After you meet with the CDE team, if you still feel like that is the next right step to get one of these two pumps, we will start the insurance process, and then you would get training to set up the pump once you have it.    January 19 -  at 1:40pm    Will also have Nurys call about LIFT

## 2022-09-15 NOTE — LETTER
9/15/2022         RE: Lynn Thompson  [id number] [facility] [id number]        Dear Colleague,    Thank you for referring your patient, Lynn Thompson, to the [facility]. Please see a copy of my visit note below.    [facility]  Islet Autotransplant, Diabetes Follow Up    Problem List:  Patient Active Problem List   Diagnosis     Iron deficiency anemia secondary to inadequate dietary iron intake     Gastric bypass status for obesity     Health Care Home     Chronic pain syndrome     Exocrine pancreatic insufficiency     Asplenia     BLU (obstructive sleep apnea)     H/O of rectopexy     Dysthymia     Anxiety     Thyroid nodule     Acquired hypothyroidism     Post-pancreatectomy diabetes (H)     Other iron deficiency anemia     Uterovaginal prolapse, unspecified     Rectal prolapse     Small bowel obstruction (H)     Acute kidney injury (H)     Urge incontinence     Urinary urgency     High-tone pelvic floor dysfunction     Pelvic floor dysfunction       HPI:  Lynn is a 57 year old female here for follow up o total pancreatectomy, islet cell autotransplant, splenectomy, liver biopsy (needle core), choledochoduodenostomy, feeding jejunostomy performed on 5/10/2013.  At the time of the procedure, the patient received 178,100 IEQ, or 3,209 IEQ/kg body weight. She has had two subsequent exploratory laparatomy for small bowel obstruction. Other notable endocrine history includes a complex cystic nodule in mid right thyroid lobe with 1 cm maximum diameter (saw Dr Adorno in 11/2016) which needs annual follow up U/S in Nov 2017, and mild hypothyroidism followed by PCP.  She had an episode of cholangitis and was hospitalized for 4 days 8/24/17 (fevers, RUQ pain, + Ecoli blood cx).    She was on NO insulin at her 1 year, 2 year, 3 year, 4 year transplant anniversaries and restart insulin just after 4 years post-tx, insulin restart date of   6/6/2017.   She had a slow opioid wean off suboxone but eventually did come off.     Other history notable for surgical procedure Admitted from 2/1- 2/5/22 for  1. Posterior suture rectopexy (Colorectal primary)  2. Sigmoidoscopy (Colorectal primary)  3. Supracervical hysterectomy with bilateral salpingooophrectomy (Uro)  4. Uterosacral ligament suspension (Uro)  5. Gan colposuspension (Uro)  6. Cystoscopy (Uro)        1)  Diabetes:  Lynn continues to have partial islet graft function.   She is on Fiasp right before meal time and has rapid gastric emptying. She uses the pen for half unit doses.   She continues on a Dexcom-- she has quite a lot more evening highs than when I have seen her in the past.  She has had social stressors leading to stress eating. That stress eating occurs after dinner to bedtime, which is clearly where she runs the highest. She has not been sure how to cover this since she has eaten dinner and taken dinner coverage and then is snacking/grazing, so she basically has simply been reactive and treating highs from the snack.  Her A1c remained elevated today at 7.7%, though I actually expected it to be even higher based on Dexcom.       Lantus 5 units per day  Novolog (Fiasp) 0.5 units per 10g, and correction scale of 0.5 u per 100 >150 (3 injections per day with meals)--     Lab Results   Component Value Date    A1C 7.7 09/15/2022    A1C 8.2 05/08/2022    A1C 6.8 01/18/2022    A1C 6.5 10/07/2021    A1C 7.0 09/03/2021    A1C 6.8 05/10/2021    A1C 6.9 02/16/2021    A1C 6.7 08/20/2020    A1C 7.1 06/05/2020    A1C 6.9 02/06/2020         Hypoglycemia history:   Recent history as above.  The patient has had NO episodes of severe hypoglycemia (seizure, coma, or neuroglycopenic symptoms severe enough to require assistance from another person).  Blood sugars were reviewed from the patient records and/or the meter download.      Uses Dexcom G6- clearly has highs as described above.  Not at gaol.   Much more variability than in the past.     Patient is good candidate for CGM therapy.  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   --  Require frequent adjustments of the insulin treatment regimen, based on therapeutic CGM test results      2)  New issues:  Weight loss has actually been stable, still thin but appears not to be actively losing     Review of systems:  A complete Review Of Systems was performed but was negative except as noted in the HPI.    Past Medical and Surgical History:  Past Medical History:   Diagnosis Date     Benign paroxysmal positional vertigo     occ.      Calculus of kidney 05/2005    x1 on L side passed, several stones.  Has been tested for oxalate.     Chronic abdominal pain 07/17/2013     Chronic pancreatitis 07/17/2013     Depression     also occ panic spells     Depressive disorder      Diabetes (H) 5/10/2013    Total Pancreatomy with Auto Islets Transplant     Dyspepsia 06/1999    H. pylori   treated     Headaches     still periodic HA's ;  often 5X/week     Hypertension 02/22/2016    Stress related     Iron deficiency anemia 11/2003    relates to gastric bypass     Post-pancreatectomy diabetes melltius 05/17/2013     Sleep apnea     uses splint     Spasm of sphincter of Oddi     [place] [facility] [place]     Thyroid nodule 11/01/2016     Past Surgical History:   Procedure Laterality Date     ABDOMINOPLASTY  06/24/2002    Tummy tu     APPENDECTOMY  1990     BUNIONECTOMY Right 12/1998     CBD Stent placement  09/2002    CBD stent; Dr. Presley       SECTION       CHOLECYSTECTOMY       COLONOSCOPY N/A 2021    Procedure: COLONOSCOPY INCOMPLETE Aborted due to incomplete prep  will need to take additional prep and return tomorrow 21;  Surgeon: Ihsan Saenz MD;  Location: [facility]     COMBINED CYSTOSCOPY, RETROGRADES, URETEROSCOPY, LASER HOLMIUM LITHOTRIPSY URETER(S), INSERT STENT Right 2015    Procedure: COMBINED CYSTOSCOPY, RETROGRADES, URETEROSCOPY, LASER HOLMIUM LITHOTRIPSY URETER(S), INSERT STENT;  Surgeon: Kennedi Aldana MD;  Location: [facility]     COMBINED CYSTOSCOPY, RETROGRADES, URETEROSCOPY, LASER HOLMIUM LITHOTRIPSY URETER(S), INSERT STENT Right 2015    Procedure: COMBINED CYSTOSCOPY, RETROGRADES, URETEROSCOPY, LASER HOLMIUM LITHOTRIPSY URETER(S), INSERT STENT;  Surgeon: Kennedi Aldana MD;  Location: [facility]     COSMETIC SURGERY  2002    Tummy tuck     CYSTECTOMY OVARIAN BENIGN Right      CYSTOSCOPY, RETROGRADES, INSERT STENT URETER(S), COMBINED  10/02/2012    Procedure: COMBINED CYSTOSCOPY, RETROGRADES, INSERT STENT URETER(S);  COMBINED CYSTOSCOPY,  , INSERT LEFT STENT URETER;  Surgeon: Johny Baez MD;  Location: [facility]     ESOPHAGOSCOPY, GASTROSCOPY, DUODENOSCOPY (EGD), COMBINED N/A 2018    Procedure: COMBINED ESOPHAGOSCOPY, GASTROSCOPY, DUODENOSCOPY (EGD);  ESOPHAGOSCOPY, GASTROSCOPY, DUODENOSCOPY (EGD)    ;  Surgeon: Tamir Rodgers MD;  Location: [facility]     EXTRACORPOREAL SHOCK WAVE LITHOTRIPSY (ESWL)  10/16/2012    Procedure: EXTRACORPOREAL SHOCK WAVE LITHOTRIPSY (ESWL);  left EXTRACORPOREAL SHOCK WAVE LITHOTRIPSY (ESWL) ;  Surgeon: Johny Baez MD;  Location: [facility]     Gastric bypass NOS       HERNIA REPAIR  2015     HYSTERECTOMY SUPRACERVICAL, BILATERAL SALPINGO-OOPHORECTOMY, COMBINED N/A 2022    Procedure: Abdominal supracervical hysterectomy, bilateral salpingooophrectomy;  Surgeon: Nicole Rivera MD;  [facility]     IRRIGATION AND DEBRIDEMENT HAND,  COMBINED Left 10/30/2020    Procedure: Left hand sharp excisional debridement of skin, subcutaneous tissue and fat with a scalpel, 2 x 1 x 1 cm.;  Surgeon: Demian Renteria MD;  Location: [facility]     LAPAROSCOPIC LYSIS ADHESIONS N/A 2015    Procedure: LAPAROSCOPIC LYSIS ADHESIONS;  Surgeon: Aaron Early MD;  [facility]     LAPAROSCOPIC LYSIS ADHESIONS N/A 2015    Procedure: LAPAROSCOPIC LYSIS ADHESIONS;  Surgeon: Aaron Early MD;  [facility]     PANCREATECTOMY, TRANSPLANT AUTO ISLET CELL, COMBINED  05/10/2013    Procedure: COMBINED PANCREATECTOMY, TRANSPLANT AUTO ISLET CELL;  Pancreatectomy, Auto Islet Cell Transplant   hernia repair, jejunostomy tube and liver biopsies with Anesthesia General with block;  Surgeon: Aaron Early MD;  [facility]     Partial ileum resection  1992     RECTOPEXY ABDOMINAL N/A 2022    Procedure: RECTOPEXY, ABDOMINAL;  Surgeon: Uriah Sheridan MD;  [facility]     REPAIR PTOSIS BROW BILATERAL Bilateral 2020    Procedure: BILATERAL BROW PTOSIS REPAIR;  Surgeon: Denise Alberts MD;  Location: [facility]     SACROCOLPOPEXY, CYSTOSCOPY, COMBINED N/A 2022    Procedure: Uterosacral ligament suspension, pina colposuspension with Cystoscopy;  Surgeon: Nicole Rivera MD;  [facility]     SIGMOIDOSCOPY FLEXIBLE N/A 2022    Procedure: SIGMOIDOSCOPY, FLEXIBLE;  Surgeon: Uriah Sheridan MD;  [facility]     Surgery for SBO       TONSILLECTOMY, ADENOIDECTOMY, COMBINED  1997     TRANSPLANT  5/10/13    Pancreatic Auto-Islet Transplant       Family History:  New changes since last visit:  none  Family History   Problem Relation Age of Onset     Family History Negative Mother      Respiratory Father         COPD;  at 69     Genitourinary Problems Father         kidney stones     Substance Abuse Father      Depression Father      Asthma Father      Heart Disease Paternal Grandfather         M.I.      Coronary Artery Disease Paternal Grandfather      Hyperlipidemia Paternal Grandfather      Genitourinary Problems Brother         multiple brothers with kidney stones     Gastrointestinal Disease Maternal Grandmother         undiagnosed 'gut' issues     Coronary Artery Disease Maternal Grandfather      Hyperlipidemia Maternal Grandfather      Cerebrovascular Disease Paternal Grandmother         At the age of 103     Anxiety Disorder Paternal Grandmother      Osteoporosis Paternal Grandmother      Anxiety Disorder Son      Anxiety Disorder Daughter      Asthma Daughter      Colon Cancer No family hx of        Social History:  Social History     Social History Narrative     Not on file     Does not work currently.  Mom to many foster dogs     Physical Exam:  Vitals: /82   Pulse 85   Wt 49.9 kg (110 lb)   LMP 12/19/2013   SpO2 97%   BMI 18.88 kg/m    BMI= Body mass index is 18.88 kg/m .  General:  Appearance is normal, no acute distress  HEENT:  NC/AT, sclera appear white  Neck:  No obvious thyromegaly  CV/Lungs:  Non distressed breathing  Skin:  No apparent rashes.   Neuro:  Normal mental status  Psych:  Normal affect    Results  Mixed Meal Test:  C Peptide   Date Value Ref Range Status   09/15/2022 0.3 (L) 0.9 - 6.9 ng/mL Final   10/07/2021 1.4 0.9 - 6.9 ng/mL Final   10/07/2021 1.2 0.9 - 6.9 ng/mL Final   09/03/2021 0.3 (L) 0.9 - 6.9 ng/mL Final   08/20/2020 3.4 0.9 - 6.9 ng/mL Final   08/20/2020 1.5 0.9 - 6.9 ng/mL Final   08/20/2020 0.5 (L) 0.9 - 6.9 ng/mL Final   05/16/2019 1.8 0.9 - 6.9 ng/mL Final   05/16/2019 1.5 0.9 - 6.9 ng/mL Final     Glucose   Date Value Ref Range Status   09/15/2022 297 (H) 70 - 99 mg/dL Final   09/15/2022 355 (H) 70 - 99 mg/dL Final   09/15/2022 170 (H) 70 - 99 mg/dL Final   09/15/2022 170 (H) 70 - 99 mg/dL Final   06/14/2022 104 (H) 70 - 99 mg/dL Final   04/16/2022 241 (H) 70 - 99 mg/dL Final   02/01/2022 90 70 - 99 mg/dL Final   01/18/2022 123 (H) 70 - 99 mg/dL Final    11/26/2021 139 (H) 70 - 99 mg/dL Final   05/10/2021 169 (H) 70 - 99 mg/dL Final   03/01/2021 141 (H) 70 - 99 mg/dL Final   02/28/2021 120 (H) 70 - 99 mg/dL Final   02/16/2021 106 (H) 70 - 99 mg/dL Final   10/30/2020 126 (H) 70 - 99 mg/dL Final     GLUCOSE BY METER POCT   Date Value Ref Range Status   02/05/2022 104 (H) 70 - 99 mg/dL Final   02/05/2022 68 (L) 70 - 99 mg/dL Final   02/05/2022 339 (H) 70 - 99 mg/dL Final   02/04/2022 201 (H) 70 - 99 mg/dL Final   02/04/2022 195 (H) 70 - 99 mg/dL Final       Hemoglobin A1c  Lab Results   Component Value Date    A1C 7.7 09/15/2022    A1C 8.2 05/08/2022    A1C 6.8 01/18/2022    A1C 6.5 10/07/2021    A1C 7.0 09/03/2021    A1C 6.8 05/10/2021    A1C 6.9 02/16/2021    A1C 6.7 08/20/2020    A1C 7.1 06/05/2020    A1C 6.9 02/06/2020       Liver enzymes  Lab Results   Component Value Date    AST 46 09/15/2022    AST 24 05/10/2021     Lab Results   Component Value Date    ALT 61 09/15/2022    ALT 35 05/10/2021     Lab Results   Component Value Date    BILICONJ 0.0 05/12/2014      Lab Results   Component Value Date    BILITOTAL 0.2 09/15/2022    BILITOTAL 0.3 05/10/2021     Lab Results   Component Value Date    ALBUMIN 4.3 09/15/2022    ALBUMIN 3.2 04/16/2022    ALBUMIN 3.4 05/10/2021     Lab Results   Component Value Date    PROTTOTAL 7.1 09/15/2022    PROTTOTAL 6.9 05/10/2021      Lab Results   Component Value Date    ALKPHOS 151 09/15/2022    ALKPHOS 132 05/10/2021       Creatinine:  Creatinine   Date Value Ref Range Status   09/15/2022 0.83 0.51 - 0.95 mg/dL Final   05/10/2021 0.81 0.52 - 1.04 mg/dL Final   ]    Complete Blood Count:  CBC RESULTS:   Recent Labs   Lab Test 09/15/22  0948   WBC 8.3   RBC 4.35   HGB 13.0   HCT 41.8   MCV 96   MCH 29.9   MCHC 31.1*   RDW 13.2          Cholesterol  Lab Results   Component Value Date    CHOL 246 09/15/2022    CHOL 192 09/17/2020     Lab Results   Component Value Date    HDL 97 09/15/2022    HDL 91 09/17/2020     Lab Results    Component Value Date     09/15/2022    LDL 84 09/17/2020     Lab Results   Component Value Date    TRIG 134 09/15/2022    TRIG 83 09/17/2020     Lab Results   Component Value Date    CHOLHDLRWALKERO 1.9 11/20/2014       Assessment:  1.  Post pancreatectomy diabetes mellitus, s/p total pancreatectomy and islet autotransplant.    Lynn is a 57 year old with history of chronic pancreatitis who is s/p total pancreatectomy and islet autotransplant.  She was insulin independent until 6/6/2017 (just after 4 years post-tx) and now has partial islet function.    We changed to Fiasp due to rapid gastric emptying and post meal hypoglycemia.      She has quite a few more prolonged highs now, that we think is related to new snacking that has not been covered because she was unsure if she could take coverage again after dinner and also b/c it is grazing which makes coverage difficult.  We talked about covering evening snack carbs and trying to eat carbs in a discrete time frame and use carb free snacks for the grazing.  I also ultimately think she is a very capable and good closed loop pump candidate. SHe was very interested in learning more about that, so will refer her to diabetes education.       Plan:  1.  Changes to current diabetes regimen:  Patient Instructions   1)  Short-term plan:  -- Keep the same doses BUT add the carb coverage in the evening for after dinner snacks.  -- Try to eat the carbs in a short amount of time and then add non-carb if you are continuing to munch (meat, juice)    2)  Long-term- let's set you up with diabetes education to start the process of getting you onto an insulin pump (omnipod 5, Tslim Control IQ) that talks to the Dexcom.  It won't necessarily make it less work for you but it will give you more tools to get better control!  After you meet with the CDE team, if you still feel like that is the next right step to get one of these two pumps, we will start the insurance process, and  then you would get training to set up the pump once you have it.    January 19 -  at 1:40pm    Will also have Nurys call about LIFT       2.  Frequency of blood sugar checks:  premeal and bedtime, and as needed for hypoglycemia (6 strip per day).    3.  Continue routine follow up for autoislet transplant patients:  Mixed meal test (6 mL/kg BoostHP to max of 360 mL) at 3 months, 6 months, and once a year post transplant.  Hemoglobin A1c levels at these time points and quarterly.    4.  Other issues addressed today:  As above    Follow up:  As above        Contact me for questions at [phone] or [phone].  Emergency number to reach pediatric endocrinology after hours is [phone].    Dr. Adriana Robles MD  [facility]  Director of Research, Islet Auto-transplant Program  Phone:  [phone]  Electronically signed: September 15, 2022 at 5:09 PM

## 2022-09-15 NOTE — PROGRESS NOTES
[facility]  Islet Autotransplant, Diabetes Follow Up    Problem List:  Patient Active Problem List   Diagnosis     Iron deficiency anemia secondary to inadequate dietary iron intake     Gastric bypass status for obesity     Health Care Home     Chronic pain syndrome     Exocrine pancreatic insufficiency     Asplenia     BLU (obstructive sleep apnea)     H/O of rectopexy     Dysthymia     Anxiety     Thyroid nodule     Acquired hypothyroidism     Post-pancreatectomy diabetes (H)     Other iron deficiency anemia     Uterovaginal prolapse, unspecified     Rectal prolapse     Small bowel obstruction (H)     Acute kidney injury (H)     Urge incontinence     Urinary urgency     High-tone pelvic floor dysfunction     Pelvic floor dysfunction       HPI:  Lynn is a 57 year old female here for follow up o total pancreatectomy, islet cell autotransplant, splenectomy, liver biopsy (needle core), choledochoduodenostomy, feeding jejunostomy performed on 5/10/2013.  At the time of the procedure, the patient received 178,100 IEQ, or 3,209 IEQ/kg body weight. She has had two subsequent exploratory laparatomy for small bowel obstruction. Other notable endocrine history includes a complex cystic nodule in mid right thyroid lobe with 1 cm maximum diameter (saw Dr Adorno in 11/2016) which needs annual follow up U/S in Nov 2017, and mild hypothyroidism followed by PCP.  She had an episode of cholangitis and was hospitalized for 4 days 8/24/17 (fevers, RUQ pain, + Ecoli blood cx).    She was on NO insulin at her 1 year, 2 year, 3 year, 4 year transplant anniversaries and restart insulin just after 4 years post-tx, insulin restart date of  6/6/2017.   She had a slow opioid wean off suboxone but eventually did come off.     Other history notable for surgical procedure Admitted from 2/1- 2/5/22 for  1. Posterior suture rectopexy (Colorectal primary)  2. Sigmoidoscopy (Colorectal primary)  3.  Supracervical hysterectomy with bilateral salpingooophrectomy (Uro)  4. Uterosacral ligament suspension (Uro)  5. Gan colposuspension (Uro)  6. Cystoscopy (Uro)        1)  Diabetes:  Lynn continues to have partial islet graft function.   She is on Fiasp right before meal time and has rapid gastric emptying. She uses the pen for half unit doses.   She continues on a Dexcom-- she has quite a lot more evening highs than when I have seen her in the past.  She has had social stressors leading to stress eating. That stress eating occurs after dinner to bedtime, which is clearly where she runs the highest. She has not been sure how to cover this since she has eaten dinner and taken dinner coverage and then is snacking/grazing, so she basically has simply been reactive and treating highs from the snack.  Her A1c remained elevated today at 7.7%, though I actually expected it to be even higher based on Dexcom.       Lantus 5 units per day  Novolog (Fiasp) 0.5 units per 10g, and correction scale of 0.5 u per 100 >150 (3 injections per day with meals)--     Lab Results   Component Value Date    A1C 7.7 09/15/2022    A1C 8.2 05/08/2022    A1C 6.8 01/18/2022    A1C 6.5 10/07/2021    A1C 7.0 09/03/2021    A1C 6.8 05/10/2021    A1C 6.9 02/16/2021    A1C 6.7 08/20/2020    A1C 7.1 06/05/2020    A1C 6.9 02/06/2020         Hypoglycemia history:   Recent history as above.  The patient has had NO episodes of severe hypoglycemia (seizure, coma, or neuroglycopenic symptoms severe enough to require assistance from another person).  Blood sugars were reviewed from the patient records and/or the meter download.      Uses Dexcom G6- clearly has highs as described above.  Not at gaol.  Much more variability than in the past.     Patient is good candidate for CGM therapy.  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   --  Require frequent adjustments of the insulin treatment regimen, based on therapeutic CGM test results      2)  New issues:  Weight loss has actually been stable, still thin but appears not to be actively losing     Review of systems:  A complete Review Of Systems was performed but was negative except as noted in the HPI.    Past Medical and Surgical History:  Past Medical History:   Diagnosis Date     Benign paroxysmal positional vertigo     occ.      Calculus of kidney 05/2005    x1 on L side passed, several stones.  Has been tested for oxalate.     Chronic abdominal pain 2013     Chronic pancreatitis 2013     Depression     also occ panic spells     Depressive disorder      Diabetes (H) 5/10/2013    Total Pancreatomy with Auto Islets Transplant     Dyspepsia 1999    H. pylori   treated     Headaches     still periodic HA's ;  often 5X/week     Hypertension 2016    Stress related     Iron deficiency anemia 2003    relates to gastric bypass     Post-pancreatectomy diabetes melltius 2013     Sleep apnea     uses splint     Spasm of sphincter of Oddi     surgical + endoscopic stenting of pancreatic duct @ [facility] 06     Thyroid nodule 2016     Past Surgical History:   Procedure Laterality Date     ABDOMINOPLASTY  2002    Tummy tuck     APPENDECTOMY  1990     BUNIONECTOMY Right 1998     CBD Stent placement  2002    CBD stent; Dr. Presley      SECTION       CHOLECYSTECTOMY       COLONOSCOPY N/A 2021    Procedure: COLONOSCOPY INCOMPLETE Aborted due to incomplete prep  will need to take additional prep and return tomorrow 21;  Surgeon: Ihsan Saenz MD;  Location:  GI      COMBINED CYSTOSCOPY, RETROGRADES, URETEROSCOPY, LASER HOLMIUM LITHOTRIPSY URETER(S), INSERT STENT Right 03/23/2015    Procedure: COMBINED CYSTOSCOPY, RETROGRADES, URETEROSCOPY, LASER HOLMIUM LITHOTRIPSY URETER(S), INSERT STENT;  Surgeon: Kennedi Aldana MD;  Location: [facility] OR     COMBINED CYSTOSCOPY, RETROGRADES, URETEROSCOPY, LASER HOLMIUM LITHOTRIPSY URETER(S), INSERT STENT Right 04/20/2015    Procedure: COMBINED CYSTOSCOPY, RETROGRADES, URETEROSCOPY, LASER HOLMIUM LITHOTRIPSY URETER(S), INSERT STENT;  Surgeon: Kennedi Aldana MD;  Location: [facility] OR     COSMETIC SURGERY  6/24/2002    Tummy tuck     CYSTECTOMY OVARIAN BENIGN Right      CYSTOSCOPY, RETROGRADES, INSERT STENT URETER(S), COMBINED  10/02/2012    Procedure: COMBINED CYSTOSCOPY, RETROGRADES, INSERT STENT URETER(S);  COMBINED CYSTOSCOPY,  , INSERT LEFT STENT URETER;  Surgeon: Johny Baez MD;  Location: [facility] OR     ESOPHAGOSCOPY, GASTROSCOPY, DUODENOSCOPY (EGD), COMBINED N/A 01/24/2018    Procedure: COMBINED ESOPHAGOSCOPY, GASTROSCOPY, DUODENOSCOPY (EGD);  ESOPHAGOSCOPY, GASTROSCOPY, DUODENOSCOPY (EGD)    ;  Surgeon: Tamir Rodgers MD;  Location: [facility] GI     EXTRACORPOREAL SHOCK WAVE LITHOTRIPSY (ESWL)  10/16/2012    Procedure: EXTRACORPOREAL SHOCK WAVE LITHOTRIPSY (ESWL);  left EXTRACORPOREAL SHOCK WAVE LITHOTRIPSY (ESWL) ;  Surgeon: Johny Baez MD;  Location: [facility] OR     Gastric bypass NOS       HERNIA REPAIR  02/2015     HYSTERECTOMY SUPRACERVICAL, BILATERAL SALPINGO-OOPHORECTOMY, COMBINED N/A 02/01/2022    Procedure: Abdominal supracervical hysterectomy, bilateral salpingooophrectomy;  Surgeon: Nicole Rivera MD;  [facility]     IRRIGATION AND DEBRIDEMENT HAND, COMBINED Left 10/30/2020    Procedure: Left hand sharp excisional debridement of skin, subcutaneous tissue and fat with a scalpel, 2 x 1 x 1 cm.;  Surgeon: Demian Renteria MD;  Location: [facility] OR     LAPAROSCOPIC LYSIS ADHESIONS N/A 02/20/2015    Procedure:  LAPAROSCOPIC LYSIS ADHESIONS;  Surgeon: Aaron Early MD;  [facility]     LAPAROSCOPIC LYSIS ADHESIONS N/A 2015    Procedure: LAPAROSCOPIC LYSIS ADHESIONS;  Surgeon: Aaron Early MD;  [facility]     PANCREATECTOMY, TRANSPLANT AUTO ISLET CELL, COMBINED  05/10/2013    Procedure: COMBINED PANCREATECTOMY, TRANSPLANT AUTO ISLET CELL;  Pancreatectomy, Auto Islet Cell Transplant   hernia repair, jejunostomy tube and liver biopsies with Anesthesia General with block;  Surgeon: Aaron Early MD;  [facility]     Partial ileum resection  1992     RECTOPEXY ABDOMINAL N/A 2022    Procedure: RECTOPEXY, ABDOMINAL;  Surgeon: Uriah Sheridan MD;  [facility]     REPAIR PTOSIS BROW BILATERAL Bilateral 2020    Procedure: BILATERAL BROW PTOSIS REPAIR;  Surgeon: Denise Alberts MD;  Location: [facility] OR     SACROCOLPOPEXY, CYSTOSCOPY, COMBINED N/A 2022    Procedure: Uterosacral ligament suspension, pina colposuspension with Cystoscopy;  Surgeon: Nicole Rivera MD;  [facility]     SIGMOIDOSCOPY FLEXIBLE N/A 2022    Procedure: SIGMOIDOSCOPY, FLEXIBLE;  Surgeon: Uriah Sheridan MD;  [facility]     Surgery for SBO       TONSILLECTOMY, ADENOIDECTOMY, COMBINED  1997     TRANSPLANT  5/10/13    Pancreatic Auto-Islet Transplant       Family History:  New changes since last visit:  none  Family History   Problem Relation Age of Onset     Family History Negative Mother      Respiratory Father         COPD;  at 69     Genitourinary Problems Father         kidney stones     Substance Abuse Father      Depression Father      Asthma Father      Heart Disease Paternal Grandfather         M.I.     Coronary Artery Disease Paternal Grandfather      Hyperlipidemia Paternal Grandfather      Genitourinary Problems Brother         multiple brothers with kidney stones     Gastrointestinal Disease Maternal Grandmother         undiagnosed 'gut' issues      Coronary Artery Disease Maternal Grandfather      Hyperlipidemia Maternal Grandfather      Cerebrovascular Disease Paternal Grandmother         At the age of 103     Anxiety Disorder Paternal Grandmother      Osteoporosis Paternal Grandmother      Anxiety Disorder Son      Anxiety Disorder Daughter      Asthma Daughter      Colon Cancer No family hx of        Social History:  Social History     Social History Narrative     Not on file     Does not work currently.  Mom to many foster dogs     Physical Exam:  Vitals: /82   Pulse 85   Wt 49.9 kg (110 lb)   LMP 12/19/2013   SpO2 97%   BMI 18.88 kg/m    BMI= Body mass index is 18.88 kg/m .  General:  Appearance is normal, no acute distress  HEENT:  NC/AT, sclera appear white  Neck:  No obvious thyromegaly  CV/Lungs:  Non distressed breathing  Skin:  No apparent rashes.   Neuro:  Normal mental status  Psych:  Normal affect    Results  Mixed Meal Test:  C Peptide   Date Value Ref Range Status   09/15/2022 0.3 (L) 0.9 - 6.9 ng/mL Final   10/07/2021 1.4 0.9 - 6.9 ng/mL Final   10/07/2021 1.2 0.9 - 6.9 ng/mL Final   09/03/2021 0.3 (L) 0.9 - 6.9 ng/mL Final   08/20/2020 3.4 0.9 - 6.9 ng/mL Final   08/20/2020 1.5 0.9 - 6.9 ng/mL Final   08/20/2020 0.5 (L) 0.9 - 6.9 ng/mL Final   05/16/2019 1.8 0.9 - 6.9 ng/mL Final   05/16/2019 1.5 0.9 - 6.9 ng/mL Final     Glucose   Date Value Ref Range Status   09/15/2022 297 (H) 70 - 99 mg/dL Final   09/15/2022 355 (H) 70 - 99 mg/dL Final   09/15/2022 170 (H) 70 - 99 mg/dL Final   09/15/2022 170 (H) 70 - 99 mg/dL Final   06/14/2022 104 (H) 70 - 99 mg/dL Final   04/16/2022 241 (H) 70 - 99 mg/dL Final   02/01/2022 90 70 - 99 mg/dL Final   01/18/2022 123 (H) 70 - 99 mg/dL Final   11/26/2021 139 (H) 70 - 99 mg/dL Final   05/10/2021 169 (H) 70 - 99 mg/dL Final   03/01/2021 141 (H) 70 - 99 mg/dL Final   02/28/2021 120 (H) 70 - 99 mg/dL Final   02/16/2021 106 (H) 70 - 99 mg/dL Final   10/30/2020 126 (H) 70 - 99 mg/dL Final     GLUCOSE BY  METER POCT   Date Value Ref Range Status   02/05/2022 104 (H) 70 - 99 mg/dL Final   02/05/2022 68 (L) 70 - 99 mg/dL Final   02/05/2022 339 (H) 70 - 99 mg/dL Final   02/04/2022 201 (H) 70 - 99 mg/dL Final   02/04/2022 195 (H) 70 - 99 mg/dL Final       Hemoglobin A1c  Lab Results   Component Value Date    A1C 7.7 09/15/2022    A1C 8.2 05/08/2022    A1C 6.8 01/18/2022    A1C 6.5 10/07/2021    A1C 7.0 09/03/2021    A1C 6.8 05/10/2021    A1C 6.9 02/16/2021    A1C 6.7 08/20/2020    A1C 7.1 06/05/2020    A1C 6.9 02/06/2020       Liver enzymes  Lab Results   Component Value Date    AST 46 09/15/2022    AST 24 05/10/2021     Lab Results   Component Value Date    ALT 61 09/15/2022    ALT 35 05/10/2021     Lab Results   Component Value Date    BILICONJ 0.0 05/12/2014      Lab Results   Component Value Date    BILITOTAL 0.2 09/15/2022    BILITOTAL 0.3 05/10/2021     Lab Results   Component Value Date    ALBUMIN 4.3 09/15/2022    ALBUMIN 3.2 04/16/2022    ALBUMIN 3.4 05/10/2021     Lab Results   Component Value Date    PROTTOTAL 7.1 09/15/2022    PROTTOTAL 6.9 05/10/2021      Lab Results   Component Value Date    ALKPHOS 151 09/15/2022    ALKPHOS 132 05/10/2021       Creatinine:  Creatinine   Date Value Ref Range Status   09/15/2022 0.83 0.51 - 0.95 mg/dL Final   05/10/2021 0.81 0.52 - 1.04 mg/dL Final   ]    Complete Blood Count:  CBC RESULTS:   Recent Labs   Lab Test 09/15/22  0948   WBC 8.3   RBC 4.35   HGB 13.0   HCT 41.8   MCV 96   MCH 29.9   MCHC 31.1*   RDW 13.2          Cholesterol  Lab Results   Component Value Date    CHOL 246 09/15/2022    CHOL 192 09/17/2020     Lab Results   Component Value Date    HDL 97 09/15/2022    HDL 91 09/17/2020     Lab Results   Component Value Date     09/15/2022    LDL 84 09/17/2020     Lab Results   Component Value Date    TRIG 134 09/15/2022    TRIG 83 09/17/2020     Lab Results   Component Value Date    CHOLHDTALYAO 1.9 11/20/2014       Assessment:  1.  Post  pancreatectomy diabetes mellitus, s/p total pancreatectomy and islet autotransplant.    Lynn is a 57 year old with history of chronic pancreatitis who is s/p total pancreatectomy and islet autotransplant.  She was insulin independent until 6/6/2017 (just after 4 years post-tx) and now has partial islet function.    We changed to Fiasp due to rapid gastric emptying and post meal hypoglycemia.      She has quite a few more prolonged highs now, that we think is related to new snacking that has not been covered because she was unsure if she could take coverage again after dinner and also b/c it is grazing which makes coverage difficult.  We talked about covering evening snack carbs and trying to eat carbs in a discrete time frame and use carb free snacks for the grazing.  I also ultimately think she is a very capable and good closed loop pump candidate. SHe was very interested in learning more about that, so will refer her to diabetes education.       Plan:  1.  Changes to current diabetes regimen:  Patient Instructions   1)  Short-term plan:  -- Keep the same doses BUT add the carb coverage in the evening for after dinner snacks.  -- Try to eat the carbs in a short amount of time and then add non-carb if you are continuing to munch (meat, juice)    2)  Long-term- let's set you up with diabetes education to start the process of getting you onto an insulin pump (omnipod 5, Tslim Control IQ) that talks to the Dexcom.  It won't necessarily make it less work for you but it will give you more tools to get better control!  After you meet with the CDE team, if you still feel like that is the next right step to get one of these two pumps, we will start the insurance process, and then you would get training to set up the pump once you have it.    January 19 -  at 1:40pm    Will also have Nurys call about LIFT       2.  Frequency of blood sugar checks:  premeal and bedtime, and as needed for hypoglycemia (6 strip per  day).    3.  Continue routine follow up for autoislet transplant patients:  Mixed meal test (6 mL/kg BoostHP to max of 360 mL) at 3 months, 6 months, and once a year post transplant.  Hemoglobin A1c levels at these time points and quarterly.    4.  Other issues addressed today:  As above    Follow up:  As above        Contact me for questions at [phone] or [phone].  Emergency number to reach pediatric endocrinology after hours is [phone].    Dr. Adriana Robles MD  [facility]  Director of Research, Islet Auto-transplant Program  Phone:  [phone]  Electronically signed: September 15, 2022 at 5:09 PM            Review of the result(s) of each unique test - A1c from recent hospital admit  Prescription drug management  40 minutes spent on the date of the encounter doing chart review, history and exam, documentation and further activities per the note

## 2022-09-15 NOTE — NURSING NOTE
Visit for Boost test per Dr. Robles. First name, last name and  verified prior to  test.     Has been fasting for 8 hours    Has not taken short acting insulin    Fasting glucase per pt's meter 150    Weight 50 kg (50x6=300)    300 mL of boost given and finished at 10:15. Enzymes were taken with Boost.    Reminded her to go to lab at 11 and 12 to have labs drawn at 11:15 and 12:15 and to remain fasting until the last lab draw, she verbally understood and agreed.    Writer called lab with times of draws.    Domonique Rollins, CMA

## 2022-09-16 ENCOUNTER — TELEPHONE (OUTPATIENT)
Dept: ENDOCRINOLOGY | Facility: CLINIC | Age: 57
End: 2022-09-16

## 2022-09-16 LAB
C PEPTIDE SERPL-MCNC: 1.2 NG/ML (ref 0.9–6.9)
C PEPTIDE SERPL-MCNC: 1.6 NG/ML (ref 0.9–6.9)
PREALB SERPL IA-MCNC: 22 MG/DL (ref 15–45)

## 2022-09-16 NOTE — TELEPHONE ENCOUNTER
PA Initiation    Medication: Viokace [id number] tablets- PA PENDING  Insurance Company: Other (see comments)  Pharmacy Filling the Rx: [id number] [place], [place] - [address]  Filling Pharmacy Phone:    Filling Pharmacy Fax:    Start Date: 9/16/2022

## 2022-09-17 LAB
A-TOCOPHEROL VIT E SERPL-MCNC: 10.3 MG/L
ANNOTATION COMMENT IMP: NORMAL
BETA+GAMMA TOCOPHEROL SERPL-MCNC: 0.9 MG/L
FOLATE RBC-MCNC: 322 NG/ML
RETINYL PALMITATE SERPL-MCNC: <0.02 MG/L
VIT A SERPL-MCNC: 0.49 MG/L

## 2022-09-18 LAB — ZINC SERPL-MCNC: 91.5 UG/DL

## 2022-09-19 ENCOUNTER — HOSPITAL ENCOUNTER (INPATIENT)
Facility: CLINIC | Age: 57
LOS: 4 days | Discharge: HOME | End: 2022-09-23
Attending: PHYSICIAN ASSISTANT | Admitting: INTERNAL MEDICINE
Payer: COMMERCIAL

## 2022-09-19 ENCOUNTER — APPOINTMENT (OUTPATIENT)
Dept: CT IMAGING | Facility: CLINIC | Age: 57
End: 2022-09-19
Attending: EMERGENCY MEDICINE
Payer: COMMERCIAL

## 2022-09-19 DIAGNOSIS — R82.81 PYURIA: ICD-10-CM

## 2022-09-19 DIAGNOSIS — N20.0 KIDNEY STONE ON LEFT SIDE: ICD-10-CM

## 2022-09-19 DIAGNOSIS — N20.1 URETERAL STONE: Primary | ICD-10-CM

## 2022-09-19 DIAGNOSIS — K56.609 SMALL BOWEL OBSTRUCTION (H): ICD-10-CM

## 2022-09-19 LAB
ALBUMIN SERPL BCG-MCNC: 4.2 G/DL (ref 3.5–5.2)
ALBUMIN UR-MCNC: 10 MG/DL
ALP SERPL-CCNC: 151 U/L (ref 35–104)
ALT SERPL W P-5'-P-CCNC: 56 U/L (ref 10–35)
ANION GAP SERPL CALCULATED.3IONS-SCNC: 11 MMOL/L (ref 7–15)
APPEARANCE UR: CLEAR
AST SERPL W P-5'-P-CCNC: 44 U/L (ref 10–35)
BACTERIA #/AREA URNS HPF: ABNORMAL /HPF
BILIRUB DIRECT SERPL-MCNC: <0.2 MG/DL (ref 0–0.3)
BILIRUB SERPL-MCNC: <0.2 MG/DL
BILIRUB UR QL STRIP: NEGATIVE
BUN SERPL-MCNC: 24.3 MG/DL (ref 8–23)
CALCIUM SERPL-MCNC: 9.8 MG/DL (ref 8.6–10)
CHLORIDE SERPL-SCNC: 102 MMOL/L (ref 98–107)
COLOR UR AUTO: ABNORMAL
CREAT SERPL-MCNC: 0.8 MG/DL (ref 0.51–0.95)
DEPRECATED CALCIDIOL+CALCIFEROL SERPL-MC: <63 UG/L (ref 20–75)
DEPRECATED HCO3 PLAS-SCNC: 28 MMOL/L (ref 22–29)
ERYTHROCYTE [DISTWIDTH] IN BLOOD BY AUTOMATED COUNT: 13.3 % (ref 10–15)
GFR SERPL CREATININE-BSD FRML MDRD: 84 ML/MIN/1.73M2
GLUCOSE SERPL-MCNC: 131 MG/DL (ref 70–99)
GLUCOSE UR STRIP-MCNC: NEGATIVE MG/DL
HCT VFR BLD AUTO: 42.3 % (ref 35–47)
HGB BLD-MCNC: 13 G/DL (ref 11.7–15.7)
HGB UR QL STRIP: NEGATIVE
HOLD SPECIMEN: NORMAL
KETONES UR STRIP-MCNC: NEGATIVE MG/DL
LACTATE SERPL-SCNC: 1.3 MMOL/L (ref 0.7–2)
LEUKOCYTE ESTERASE UR QL STRIP: ABNORMAL
MCH RBC QN AUTO: 29.6 PG (ref 26.5–33)
MCHC RBC AUTO-ENTMCNC: 30.7 G/DL (ref 31.5–36.5)
MCV RBC AUTO: 96 FL (ref 78–100)
MUCOUS THREADS #/AREA URNS LPF: PRESENT /LPF
NITRATE UR QL: NEGATIVE
PH UR STRIP: 5.5 [PH] (ref 5–7)
PLATELET # BLD AUTO: 422 10E3/UL (ref 150–450)
POTASSIUM SERPL-SCNC: 4.5 MMOL/L (ref 3.4–5.3)
PROT SERPL-MCNC: 7.1 G/DL (ref 6.4–8.3)
RBC # BLD AUTO: 4.39 10E6/UL (ref 3.8–5.2)
RBC URINE: 5 /HPF
SARS-COV-2 RNA RESP QL NAA+PROBE: NEGATIVE
SODIUM SERPL-SCNC: 141 MMOL/L (ref 136–145)
SP GR UR STRIP: 1.02 (ref 1–1.03)
UROBILINOGEN UR STRIP-MCNC: NORMAL MG/DL
VITAMIN D2 SERPL-MCNC: <5 UG/L
VITAMIN D3 SERPL-MCNC: 58 UG/L
WBC # BLD AUTO: 12.4 10E3/UL (ref 4–11)
WBC URINE: 39 /HPF

## 2022-09-19 PROCEDURE — 258N000003 HC RX IP 258 OP 636: Performed by: EMERGENCY MEDICINE

## 2022-09-19 PROCEDURE — 85027 COMPLETE CBC AUTOMATED: CPT | Performed by: PHYSICIAN ASSISTANT

## 2022-09-19 PROCEDURE — 83605 ASSAY OF LACTIC ACID: CPT | Performed by: EMERGENCY MEDICINE

## 2022-09-19 PROCEDURE — 81001 URINALYSIS AUTO W/SCOPE: CPT | Performed by: EMERGENCY MEDICINE

## 2022-09-19 PROCEDURE — 80053 COMPREHEN METABOLIC PANEL: CPT | Performed by: PHYSICIAN ASSISTANT

## 2022-09-19 PROCEDURE — 96376 TX/PRO/DX INJ SAME DRUG ADON: CPT

## 2022-09-19 PROCEDURE — 99223 1ST HOSP IP/OBS HIGH 75: CPT | Mod: AI | Performed by: INTERNAL MEDICINE

## 2022-09-19 PROCEDURE — 87040 BLOOD CULTURE FOR BACTERIA: CPT | Performed by: EMERGENCY MEDICINE

## 2022-09-19 PROCEDURE — 36415 COLL VENOUS BLD VENIPUNCTURE: CPT | Performed by: EMERGENCY MEDICINE

## 2022-09-19 PROCEDURE — 96375 TX/PRO/DX INJ NEW DRUG ADDON: CPT

## 2022-09-19 PROCEDURE — 250N000009 HC RX 250: Performed by: EMERGENCY MEDICINE

## 2022-09-19 PROCEDURE — 250N000011 HC RX IP 250 OP 636: Performed by: EMERGENCY MEDICINE

## 2022-09-19 PROCEDURE — 85027 COMPLETE CBC AUTOMATED: CPT | Performed by: EMERGENCY MEDICINE

## 2022-09-19 PROCEDURE — 82248 BILIRUBIN DIRECT: CPT | Performed by: EMERGENCY MEDICINE

## 2022-09-19 PROCEDURE — C9803 HOPD COVID-19 SPEC COLLECT: HCPCS

## 2022-09-19 PROCEDURE — 120N000006 HC R&B PEDS

## 2022-09-19 PROCEDURE — 74177 CT ABD & PELVIS W/CONTRAST: CPT | Mod: MG

## 2022-09-19 PROCEDURE — U0003 INFECTIOUS AGENT DETECTION BY NUCLEIC ACID (DNA OR RNA); SEVERE ACUTE RESPIRATORY SYNDROME CORONAVIRUS 2 (SARS-COV-2) (CORONAVIRUS DISEASE [COVID-19]), AMPLIFIED PROBE TECHNIQUE, MAKING USE OF HIGH THROUGHPUT TECHNOLOGIES AS DESCRIBED BY CMS-2020-01-R: HCPCS | Performed by: EMERGENCY MEDICINE

## 2022-09-19 PROCEDURE — 99285 EMERGENCY DEPT VISIT HI MDM: CPT | Mod: 25

## 2022-09-19 PROCEDURE — 82310 ASSAY OF CALCIUM: CPT | Performed by: EMERGENCY MEDICINE

## 2022-09-19 PROCEDURE — 96365 THER/PROPH/DIAG IV INF INIT: CPT | Mod: 59

## 2022-09-19 PROCEDURE — 87086 URINE CULTURE/COLONY COUNT: CPT | Performed by: EMERGENCY MEDICINE

## 2022-09-19 RX ORDER — HYDROMORPHONE HYDROCHLORIDE 1 MG/ML
0.5 INJECTION, SOLUTION INTRAMUSCULAR; INTRAVENOUS; SUBCUTANEOUS ONCE
Status: COMPLETED | OUTPATIENT
Start: 2022-09-19 | End: 2022-09-19

## 2022-09-19 RX ORDER — KETOROLAC TROMETHAMINE 15 MG/ML
15 INJECTION, SOLUTION INTRAMUSCULAR; INTRAVENOUS ONCE
Status: COMPLETED | OUTPATIENT
Start: 2022-09-19 | End: 2022-09-19

## 2022-09-19 RX ORDER — CEFTRIAXONE 1 G/1
1 INJECTION, POWDER, FOR SOLUTION INTRAMUSCULAR; INTRAVENOUS ONCE
Status: COMPLETED | OUTPATIENT
Start: 2022-09-19 | End: 2022-09-19

## 2022-09-19 RX ORDER — IOPAMIDOL 755 MG/ML
500 INJECTION, SOLUTION INTRAVASCULAR ONCE
Status: COMPLETED | OUTPATIENT
Start: 2022-09-19 | End: 2022-09-19

## 2022-09-19 RX ADMIN — SODIUM CHLORIDE 1000 ML: 9 INJECTION, SOLUTION INTRAVENOUS at 20:09

## 2022-09-19 RX ADMIN — CEFTRIAXONE 1 G: 1 INJECTION, POWDER, FOR SOLUTION INTRAMUSCULAR; INTRAVENOUS at 20:21

## 2022-09-19 RX ADMIN — HYDROMORPHONE HYDROCHLORIDE 0.5 MG: 1 INJECTION, SOLUTION INTRAMUSCULAR; INTRAVENOUS; SUBCUTANEOUS at 20:09

## 2022-09-19 RX ADMIN — KETOROLAC TROMETHAMINE 15 MG: 15 INJECTION, SOLUTION INTRAMUSCULAR; INTRAVENOUS at 16:49

## 2022-09-19 RX ADMIN — IOPAMIDOL 56 ML: 755 INJECTION, SOLUTION INTRAVENOUS at 17:19

## 2022-09-19 RX ADMIN — SODIUM CHLORIDE 56 ML: 9 INJECTION, SOLUTION INTRAVENOUS at 17:19

## 2022-09-19 RX ADMIN — HYDROMORPHONE HYDROCHLORIDE 0.5 MG: 1 INJECTION, SOLUTION INTRAMUSCULAR; INTRAVENOUS; SUBCUTANEOUS at 22:12

## 2022-09-19 ASSESSMENT — ACTIVITIES OF DAILY LIVING (ADL)
ADLS_ACUITY_SCORE: 37

## 2022-09-19 ASSESSMENT — ENCOUNTER SYMPTOMS
CONSTIPATION: 0
FREQUENCY: 0
BLOOD IN STOOL: 0
COUGH: 0
APPETITE CHANGE: 1
DIARRHEA: 0
NAUSEA: 1
ABDOMINAL DISTENTION: 1
DYSURIA: 0
SHORTNESS OF BREATH: 0
ABDOMINAL PAIN: 1
HEMATURIA: 0
FEVER: 0
VOMITING: 1

## 2022-09-19 NOTE — TELEPHONE ENCOUNTER
Prior Authorization Approval    Authorization Effective Date: 9/17/2022  Authorization Expiration Date: 9/17/2023  Medication: Viokace [id number] tablets- PA Approved  Approved Dose/Quantity: UD  Reference #:     Insurance Company: Other (see comments)  Expected CoPay:  $0  CoPay Card Available:      Foundation Assistance Needed:    Which Pharmacy is filling the prescription (Not needed for infusion/clinic administered): [id number] - [place], [place] - [address] [address]  Pharmacy Notified: Yes  Patient Notified: Yes- called patient and left message to call me back

## 2022-09-19 NOTE — ED PROVIDER NOTES
Rapid Assessment Note    History:   Lynn Thompson is a 57 year old female who presents with abdominal pain and vomiting. She states that she did not feel well yesterday, but this morning was worse. She reports abdominal distention and hardness, vomiting, and reduced appetite. The patient has vomited 3 times, noting it is mainly bile. She notes a heating pad today has helped slightly. Her abdominal pain is sharp and radiates to her mid back. The patient's last bowel movement was this morning, but very little. Last normal bowel movement was 1-2 days ago. She denies black or bloody stools, and she has not passed gas. She also denies fevers, chills, chest pain, shortness of breath, neck pain, numbness, tingling, dysuria, and hematuria. Of note, the patient has had a past obstruction and thinks this feels the same. She reports past gastric bypass, auto islet transplant for pancreatitis, 2-3 past blockages, cholecystectomy, appendectomy. No history of heavy alcohol use, smoking, or drug use. She does not take blood thinners.     Exam:   Constitutional:       General: Not in acute distress.        Appearance: Normal appearance.   HENT:      Head: Normocephalic and atraumatic.   Eyes:      Extraocular Movements: Extraocular movements intact.      Conjunctiva/sclera: Conjunctivae normal.   Cardiovascular:      Rate and Rhythm: Normal rate and regular rhythm.   Pulmonary:      Effort: Pulmonary effort is normal. No respiratory distress.      Breath sounds: Normal breath sounds.   Abdominal:      General: Abdomen is flat. There is no distension.      Palpations: Abdomen is soft.      Tenderness: LLQ abdominal tenderness to palpation.   Musculoskeletal:      Cervical back: Normal range of motion. No rigidity.      Right lower leg: No edema.      Left lower leg: No edema.   Skin:     General: Skin is warm and dry.   Neurological:      General: No focal deficit present.      Mental Status: Alert and oriented to person, place,  and time.   Psychiatric:         Mood and Affect: Mood normal.         Behavior: Behavior normal.    Plan of Care:   I evaluated the patient and developed an initial plan of care. I discussed this plan and explained that I, or one of my partners, would be returning to complete the evaluation.  57-year-old female as described above presents to the emergency department with abdominal pain, nausea, and vomiting.  Patient hemodynamically stable to evaluation.  Afebrile.  Patient endorses abdominal pain that radiates to the back that is worsened predominantly to the left lower quadrant.  History of multiple abdominal surgeries.  Patient high risk for bowel obstruction.  Patient at the same time also has history of chronic pancreatitis.  Abdominal lab work ordered.  Liver function testing.  Lipase testing.  We will CT imaging with IV contrast dye.  IV pain control once patient in the main ER.  Critical and emergent diagnoses considered including, but not limited to, bowel obstruction, pancreatitis, acute diverticulitis, and internal hernia.  Discussed care plan with patient who voiced understanding and agreement with plan.  Answered all questions.  Additional work-up and orders as listed in chart.  Care transitioned to one of my partners upon completion of rapid assessment.    I, Shawna Packer, am serving as a scribe to document services personally performed by Tay Montez DO, based on my observations and the provider's statements to me.    ED Course as of 09/19/22 1650   Mon Sep 19, 2022   1648 Patient in significant pain. Unable to give opioids while in triage. Due to concerns for bowel obstruction, will maintain patient strict NPO. Discussed with patient potential risk of GI upset with toradol and worsening gastric ulcerations, patient accepts risks and requests medication. Will administer one time dose until patient is able to go to room.         09/19/2022  [employer]    Portions  of this medical record were completed by a scribe. UPON MY REVIEW AND AUTHENTICATION BY ELECTRONIC SIGNATURE, this confirms (a) I performed the applicable clinical services, and (b) the record is accurate.        Tay Montez,   09/19/22 1605       Tay Montez DO  09/19/22 1605       Tay Montez DO  09/19/22 [id number]

## 2022-09-19 NOTE — ED NOTES
[facility]  ED Nurse Handoff Report    Lynn Thompson is a 57 year old female   ED Chief complaint: Abdominal Pain  . ED Diagnosis:   Final diagnoses:   None     Allergies:   Allergies   Allergen Reactions     Corticosteroids Other (See Comments)     All oral, IV and injectable steroids are contraindicated (unless in life threatening situations) in Islet Auto transplant recipients. They can cause irreversible loss of islet cell function. Please contact patient's transplant care coordinator, Erlinda Multani RN BSN at [phone]/pager: [phone] and endocrinologist prior to administration.       Povidone Iodine Hives     Causes skin to blister     Nsaids      naprosyn = GI upset     Sulfasalazine Nausea and Nausea and Vomiting       Code Status: Full Code  Activity level - Baseline/Home:  Independent. Activity Level - Current:   Independent. Lift room needed: No. Bariatric: No   Needed: No   Isolation: No. Infection: Not Applicable.     Vital Signs:   Vitals:    09/19/22 1539   BP: 131/87   Pulse: 98   Resp: 16   Temp: 97.4  F (36.3  C)   SpO2: 98%       Cardiac Rhythm:  ,      Pain level:    Patient confused: No. Patient Falls Risk: Yes.   Elimination Status: Pt has not voided for this RN as of yet   Patient Report - Initial Complaint: abdominal pain. Focused Assessment:   Lynn Thompson is a 57 year old female who presents with abdominal pain and vomiting. She states that she did not feel well yesterday, but this morning was worse. She reports abdominal distention and hardness, vomiting, and reduced appetite. The patient has vomited 3 times, noting it is mainly bile. She notes a heating pad today has helped slightly. Her abdominal pain is sharp and radiates to her mid back. The patient's last bowel movement was this morning, but very little. Last normal bowel movement was 1-2 days ago. She denies black or bloody stools, and she has not passed gas. She also denies fevers, chills,  chest pain, shortness of breath, neck pain, numbness, tingling, dysuria, and hematuria. Of note, the patient has had a past obstruction and thinks this feels the same. She reports past gastric bypass, auto islet transplant for pancreatitis, 2-3 past blockages, cholecystectomy, appendectomy. No history of heavy alcohol use, smoking, or drug use. She does not take blood thinners.      Tests Performed: lab work, CT scan. Abnormal Results:   Labs Ordered and Resulted from Time of ED Arrival to Time of ED Departure   CBC WITH PLATELETS - Abnormal       Result Value    WBC Count 12.4 (*)     RBC Count 4.39      Hemoglobin 13.0      Hematocrit 42.3      MCV 96      MCH 29.6      MCHC 30.7 (*)     RDW 13.3      Platelet Count 422     BASIC METABOLIC PANEL - Abnormal    Sodium 141      Potassium 4.5      Chloride 102      Carbon Dioxide (CO2) 28      Anion Gap 11      Urea Nitrogen 24.3 (*)     Creatinine 0.80      Calcium 9.8      Glucose 131 (*)     GFR Estimate 84     HEPATIC FUNCTION PANEL - Abnormal    Protein Total 7.1      Albumin 4.2      Bilirubin Total <0.2      Alkaline Phosphatase 151 (*)     AST 44 (*)     ALT 56 (*)     Bilirubin Direct <0.20     ROUTINE UA WITH MICROSCOPIC REFLEX TO CULTURE - Abnormal    Color Urine Light Yellow      Appearance Urine Clear      Glucose Urine Negative      Bilirubin Urine Negative      Ketones Urine Negative      Specific Gravity Urine 1.022      Blood Urine Negative      pH Urine 5.5      Protein Albumin Urine 10  (*)     Urobilinogen Urine Normal      Nitrite Urine Negative      Leukocyte Esterase Urine Moderate (*)     Bacteria Urine Few (*)     Mucus Urine Present (*)     RBC Urine 5 (*)     WBC Urine 39 (*)    URINE CULTURE     CT Abdomen Pelvis w Contrast   Final Result   IMPRESSION:       1.  Partially obstructing 9 x 4 mm calculus within the proximal left ureter, resulting in mild pelviectasis but no significant caliectasis.      2.  Status post gastric bypass, with a  probable early or partial mechanical small bowel obstruction. There are multiple mildly dilated loops of small bowel in the midabdomen, some of which contain fecalized contents. Hepatobiliary loops in the left upper    quadrant are also mildly dilated. No discrete transition point is identified. No signs of bowel compromise.      3.  Large volume stool throughout the colon.      4.  Nonobstructing right renal calculi measuring up to 4 mm.      .   Treatments provided: toradol  Family Comments: Pt here with significant other who is aware of plan of care  OBS brochure/video discussed/provided to patient:  N/A  ED Medications:   Medications   ketorolac (TORADOL) injection 15 mg (15 mg Intravenous Given 9/19/22 1649)   iopamidol (ISOVUE-370) solution 500 mL (56 mLs Intravenous Given 9/19/22 1719)   CT Scan Flush (56 mLs Intravenous Given 9/19/22 1719)     Drips infusing:  No  For the majority of the shift, the patient's behavior Green. Interventions performed were n/a.    Sepsis treatment initiated: No     Patient tested for COVID 19 prior to admission: YES    ED Nurse Name/Phone Number: Rachel Leon RN,   6:56 PM    RECEIVING UNIT ED HANDOFF REVIEW    Above ED Nurse Handoff Report was reviewed: Yes  Reviewed by: Sayda Bansal RN on September 20, 2022 at 8:04 AM

## 2022-09-20 ENCOUNTER — ANESTHESIA EVENT (OUTPATIENT)
Dept: SURGERY | Facility: CLINIC | Age: 57
End: 2022-09-20
Payer: COMMERCIAL

## 2022-09-20 ENCOUNTER — APPOINTMENT (OUTPATIENT)
Dept: GENERAL RADIOLOGY | Facility: CLINIC | Age: 57
End: 2022-09-20
Attending: SURGERY
Payer: COMMERCIAL

## 2022-09-20 ENCOUNTER — APPOINTMENT (OUTPATIENT)
Dept: GENERAL RADIOLOGY | Facility: CLINIC | Age: 57
End: 2022-09-20
Attending: STUDENT IN AN ORGANIZED HEALTH CARE EDUCATION/TRAINING PROGRAM
Payer: COMMERCIAL

## 2022-09-20 ENCOUNTER — ANESTHESIA (OUTPATIENT)
Dept: SURGERY | Facility: CLINIC | Age: 57
End: 2022-09-20
Payer: COMMERCIAL

## 2022-09-20 LAB
A-LINOLENATE SERPL-SCNC: 41 NMOL/ML (ref 50–130)
AA SERPL-SCNC: 1907 NMOL/ML (ref 520–1490)
ANION GAP SERPL CALCULATED.3IONS-SCNC: 8 MMOL/L (ref 7–15)
ARACHIDATE SERPL-SCNC: 41 NMOL/ML (ref 50–90)
BUN SERPL-MCNC: 18.7 MG/DL (ref 8–23)
CALCIUM SERPL-MCNC: 8.8 MG/DL (ref 8.6–10)
CHLORIDE SERPL-SCNC: 107 MMOL/L (ref 98–107)
CLINICAL BIOCHEMIST REVIEW: ABNORMAL
CREAT SERPL-MCNC: 0.67 MG/DL (ref 0.51–0.95)
DEPRECATED HCO3 PLAS-SCNC: 25 MMOL/L (ref 22–29)
DHA SERPL-SCNC: 104 NMOL/ML (ref 30–250)
DOCOSAPENTAENATE W6 SERPL-SCNC: 65 NMOL/ML (ref 10–70)
DOCOSATETRAENOATE SERPL-SCNC: 93 NMOL/ML (ref 10–80)
DOCOSENOATE SERPL-SCNC: 6 NMOL/ML (ref 4–13)
DPA SERPL-SCNC: 142 NMOL/ML (ref 20–210)
EPA SERPL-SCNC: 159 NMOL/ML (ref 14–100)
ERYTHROCYTE [DISTWIDTH] IN BLOOD BY AUTOMATED COUNT: 13.5 % (ref 10–15)
FA SERPL-SCNC: 14.6 MMOL/L (ref 7.3–16.8)
G-LINOLENATE SERPL-SCNC: 123 NMOL/ML (ref 16–150)
GFR SERPL CREATININE-BSD FRML MDRD: >90 ML/MIN/1.73M2
GLUCOSE BLDC GLUCOMTR-MCNC: 108 MG/DL (ref 70–99)
GLUCOSE BLDC GLUCOMTR-MCNC: 109 MG/DL (ref 70–99)
GLUCOSE BLDC GLUCOMTR-MCNC: 123 MG/DL (ref 70–99)
GLUCOSE BLDC GLUCOMTR-MCNC: 125 MG/DL (ref 70–99)
GLUCOSE BLDC GLUCOMTR-MCNC: 126 MG/DL (ref 70–99)
GLUCOSE BLDC GLUCOMTR-MCNC: 156 MG/DL (ref 70–99)
GLUCOSE BLDC GLUCOMTR-MCNC: 85 MG/DL (ref 70–99)
GLUCOSE BLDC GLUCOMTR-MCNC: 85 MG/DL (ref 70–99)
GLUCOSE SERPL-MCNC: 172 MG/DL (ref 70–99)
HCT VFR BLD AUTO: 35.7 % (ref 35–47)
HEXADECENOATE SERPL-SCNC: 150 NMOL/ML (ref 25–105)
HGB BLD-MCNC: 11 G/DL (ref 11.7–15.7)
HOMO-G LINOLENATE SERPL-SCNC: 232 NMOL/ML (ref 50–250)
LAURATE SERPL-SCNC: 46 NMOL/ML (ref 6–90)
LINOLEATE SERPL-SCNC: 2423 NMOL/ML (ref 2270–3850)
MCH RBC QN AUTO: 29.8 PG (ref 26.5–33)
MCHC RBC AUTO-ENTMCNC: 30.8 G/DL (ref 31.5–36.5)
MCV RBC AUTO: 97 FL (ref 78–100)
MEAD ACID SERPL-SCNC: 77 NMOL/ML (ref 7–30)
MONOUNSAT FA SERPL-SCNC: 4.2 MMOL/L (ref 1.3–5.8)
MYRISTATE SERPL-SCNC: 382 NMOL/ML (ref 30–450)
NERVONATE SERPL-SCNC: 118 NMOL/ML (ref 60–100)
OCTADECANOATE SERPL-SCNC: 1292 NMOL/ML (ref 590–1170)
OLEATE SERPL-SCNC: 2851 NMOL/ML (ref 650–3500)
PALMITATE SERPL-SCNC: 3040 NMOL/ML (ref 1480–3730)
PALMITOLEATE SERPL-SCNC: 717 NMOL/ML (ref 110–1130)
PLATELET # BLD AUTO: 378 10E3/UL (ref 150–450)
POLYUNSAT FA SERPL-SCNC: 5.4 MMOL/L (ref 3.2–5.8)
POTASSIUM SERPL-SCNC: 4.9 MMOL/L (ref 3.4–5.3)
RBC # BLD AUTO: 3.69 10E6/UL (ref 3.8–5.2)
SAT FA SERPL-SCNC: 5 MMOL/L (ref 2.5–5.5)
SODIUM SERPL-SCNC: 140 MMOL/L (ref 136–145)
TRIENOATE/AA SERPL-SRTO: 0.04 {RATIO} (ref 0.01–0.04)
VACCENATE SERPL-SCNC: 248 NMOL/ML (ref 280–740)
W3 FA SERPL-SCNC: 0.4 MMOL/L (ref 0.2–0.5)
W6 FA SERPL-SCNC: 4.8 MMOL/L (ref 3–5.4)
WBC # BLD AUTO: 6.8 10E3/UL (ref 4–11)

## 2022-09-20 PROCEDURE — 250N000011 HC RX IP 250 OP 636: Performed by: INTERNAL MEDICINE

## 2022-09-20 PROCEDURE — 96375 TX/PRO/DX INJ NEW DRUG ADDON: CPT

## 2022-09-20 PROCEDURE — 250N000009 HC RX 250: Performed by: STUDENT IN AN ORGANIZED HEALTH CARE EDUCATION/TRAINING PROGRAM

## 2022-09-20 PROCEDURE — 250N000011 HC RX IP 250 OP 636: Performed by: STUDENT IN AN ORGANIZED HEALTH CARE EDUCATION/TRAINING PROGRAM

## 2022-09-20 PROCEDURE — 250N000011 HC RX IP 250 OP 636: Performed by: NURSE ANESTHETIST, CERTIFIED REGISTERED

## 2022-09-20 PROCEDURE — 80048 BASIC METABOLIC PNL TOTAL CA: CPT | Performed by: STUDENT IN AN ORGANIZED HEALTH CARE EDUCATION/TRAINING PROGRAM

## 2022-09-20 PROCEDURE — 250N000013 HC RX MED GY IP 250 OP 250 PS 637: Performed by: INTERNAL MEDICINE

## 2022-09-20 PROCEDURE — 74420 UROGRAPHY RTRGR +-KUB: CPT | Mod: 26 | Performed by: STUDENT IN AN ORGANIZED HEALTH CARE EDUCATION/TRAINING PROGRAM

## 2022-09-20 PROCEDURE — 52356 CYSTO/URETERO W/LITHOTRIPSY: CPT | Mod: LT | Performed by: STUDENT IN AN ORGANIZED HEALTH CARE EDUCATION/TRAINING PROGRAM

## 2022-09-20 PROCEDURE — 258N000001 HC RX 258: Performed by: INTERNAL MEDICINE

## 2022-09-20 PROCEDURE — 120N000004 HC R&B MS OVERFLOW

## 2022-09-20 PROCEDURE — 258N000003 HC RX IP 258 OP 636: Performed by: NURSE ANESTHETIST, CERTIFIED REGISTERED

## 2022-09-20 PROCEDURE — 360N000083 HC SURGERY LEVEL 3 W/ FLUORO, PER MIN: Performed by: STUDENT IN AN ORGANIZED HEALTH CARE EDUCATION/TRAINING PROGRAM

## 2022-09-20 PROCEDURE — 36415 COLL VENOUS BLD VENIPUNCTURE: CPT | Performed by: STUDENT IN AN ORGANIZED HEALTH CARE EDUCATION/TRAINING PROGRAM

## 2022-09-20 PROCEDURE — 258N000001 HC RX 258: Performed by: STUDENT IN AN ORGANIZED HEALTH CARE EDUCATION/TRAINING PROGRAM

## 2022-09-20 PROCEDURE — 250N000011 HC RX IP 250 OP 636: Performed by: ANESTHESIOLOGY

## 2022-09-20 PROCEDURE — 85014 HEMATOCRIT: CPT | Performed by: STUDENT IN AN ORGANIZED HEALTH CARE EDUCATION/TRAINING PROGRAM

## 2022-09-20 PROCEDURE — 82365 CALCULUS SPECTROSCOPY: CPT | Performed by: STUDENT IN AN ORGANIZED HEALTH CARE EDUCATION/TRAINING PROGRAM

## 2022-09-20 PROCEDURE — C2617 STENT, NON-COR, TEM W/O DEL: HCPCS | Performed by: STUDENT IN AN ORGANIZED HEALTH CARE EDUCATION/TRAINING PROGRAM

## 2022-09-20 PROCEDURE — C1894 INTRO/SHEATH, NON-LASER: HCPCS | Performed by: STUDENT IN AN ORGANIZED HEALTH CARE EDUCATION/TRAINING PROGRAM

## 2022-09-20 PROCEDURE — 250N000009 HC RX 250: Performed by: NURSE ANESTHETIST, CERTIFIED REGISTERED

## 2022-09-20 PROCEDURE — 710N000009 HC RECOVERY PHASE 1, LEVEL 1, PER MIN: Performed by: STUDENT IN AN ORGANIZED HEALTH CARE EDUCATION/TRAINING PROGRAM

## 2022-09-20 PROCEDURE — 250N000012 HC RX MED GY IP 250 OP 636 PS 637: Performed by: INTERNAL MEDICINE

## 2022-09-20 PROCEDURE — 74018 RADEX ABDOMEN 1 VIEW: CPT

## 2022-09-20 PROCEDURE — C9113 INJ PANTOPRAZOLE SODIUM, VIA: HCPCS | Performed by: INTERNAL MEDICINE

## 2022-09-20 PROCEDURE — 272N000001 HC OR GENERAL SUPPLY STERILE: Performed by: STUDENT IN AN ORGANIZED HEALTH CARE EDUCATION/TRAINING PROGRAM

## 2022-09-20 PROCEDURE — 255N000002 HC RX 255 OP 636: Performed by: STUDENT IN AN ORGANIZED HEALTH CARE EDUCATION/TRAINING PROGRAM

## 2022-09-20 PROCEDURE — 258N000003 HC RX IP 258 OP 636: Performed by: ANESTHESIOLOGY

## 2022-09-20 PROCEDURE — 96366 THER/PROPH/DIAG IV INF ADDON: CPT

## 2022-09-20 PROCEDURE — 99222 1ST HOSP IP/OBS MODERATE 55: CPT | Performed by: SURGERY

## 2022-09-20 PROCEDURE — 250N000009 HC RX 250: Performed by: INTERNAL MEDICINE

## 2022-09-20 PROCEDURE — 0T778DZ DILATION OF LEFT URETER WITH INTRALUMINAL DEVICE, VIA NATURAL OR ARTIFICIAL OPENING ENDOSCOPIC: ICD-10-PCS | Performed by: STUDENT IN AN ORGANIZED HEALTH CARE EDUCATION/TRAINING PROGRAM

## 2022-09-20 PROCEDURE — 370N000017 HC ANESTHESIA TECHNICAL FEE, PER MIN: Performed by: STUDENT IN AN ORGANIZED HEALTH CARE EDUCATION/TRAINING PROGRAM

## 2022-09-20 PROCEDURE — 99233 SBSQ HOSP IP/OBS HIGH 50: CPT | Performed by: STUDENT IN AN ORGANIZED HEALTH CARE EDUCATION/TRAINING PROGRAM

## 2022-09-20 PROCEDURE — C1769 GUIDE WIRE: HCPCS | Performed by: STUDENT IN AN ORGANIZED HEALTH CARE EDUCATION/TRAINING PROGRAM

## 2022-09-20 PROCEDURE — 999N000179 XR SURGERY CARM FLUORO LESS THAN 5 MIN W STILLS: Mod: TC

## 2022-09-20 PROCEDURE — 0TC78ZZ EXTIRPATION OF MATTER FROM LEFT URETER, VIA NATURAL OR ARTIFICIAL OPENING ENDOSCOPIC: ICD-10-PCS | Performed by: STUDENT IN AN ORGANIZED HEALTH CARE EDUCATION/TRAINING PROGRAM

## 2022-09-20 PROCEDURE — 0TC48ZZ EXTIRPATION OF MATTER FROM LEFT KIDNEY PELVIS, VIA NATURAL OR ARTIFICIAL OPENING ENDOSCOPIC: ICD-10-PCS | Performed by: STUDENT IN AN ORGANIZED HEALTH CARE EDUCATION/TRAINING PROGRAM

## 2022-09-20 PROCEDURE — 999N000141 HC STATISTIC PRE-PROCEDURE NURSING ASSESSMENT: Performed by: STUDENT IN AN ORGANIZED HEALTH CARE EDUCATION/TRAINING PROGRAM

## 2022-09-20 PROCEDURE — 96367 TX/PROPH/DG ADDL SEQ IV INF: CPT

## 2022-09-20 PROCEDURE — 96376 TX/PRO/DX INJ SAME DRUG ADON: CPT

## 2022-09-20 PROCEDURE — 99222 1ST HOSP IP/OBS MODERATE 55: CPT | Mod: 25 | Performed by: STUDENT IN AN ORGANIZED HEALTH CARE EDUCATION/TRAINING PROGRAM

## 2022-09-20 DEVICE — STENT URETERAL POLARIS ULTRA 6FRX24CM M0061921320: Type: IMPLANTABLE DEVICE | Site: URETER | Status: FUNCTIONAL

## 2022-09-20 RX ORDER — ONDANSETRON 2 MG/ML
4 INJECTION INTRAMUSCULAR; INTRAVENOUS EVERY 30 MIN PRN
Status: DISCONTINUED | OUTPATIENT
Start: 2022-09-20 | End: 2022-09-20 | Stop reason: HOSPADM

## 2022-09-20 RX ORDER — ONDANSETRON 4 MG/1
4 TABLET, ORALLY DISINTEGRATING ORAL EVERY 6 HOURS PRN
Status: DISCONTINUED | OUTPATIENT
Start: 2022-09-20 | End: 2022-09-23 | Stop reason: HOSPADM

## 2022-09-20 RX ORDER — EPHEDRINE SULFATE 50 MG/ML
INJECTION, SOLUTION INTRAVENOUS PRN
Status: DISCONTINUED | OUTPATIENT
Start: 2022-09-20 | End: 2022-09-20

## 2022-09-20 RX ORDER — GLYCOPYRROLATE 0.2 MG/ML
INJECTION, SOLUTION INTRAMUSCULAR; INTRAVENOUS PRN
Status: DISCONTINUED | OUTPATIENT
Start: 2022-09-20 | End: 2022-09-20

## 2022-09-20 RX ORDER — FENTANYL CITRATE 50 UG/ML
50 INJECTION, SOLUTION INTRAMUSCULAR; INTRAVENOUS EVERY 5 MIN PRN
Status: DISCONTINUED | OUTPATIENT
Start: 2022-09-20 | End: 2022-09-20 | Stop reason: HOSPADM

## 2022-09-20 RX ORDER — PROCHLORPERAZINE MALEATE 5 MG
10 TABLET ORAL EVERY 6 HOURS PRN
Status: DISCONTINUED | OUTPATIENT
Start: 2022-09-20 | End: 2022-09-23 | Stop reason: HOSPADM

## 2022-09-20 RX ORDER — ONDANSETRON 2 MG/ML
INJECTION INTRAMUSCULAR; INTRAVENOUS PRN
Status: DISCONTINUED | OUTPATIENT
Start: 2022-09-20 | End: 2022-09-20

## 2022-09-20 RX ORDER — SODIUM CHLORIDE, SODIUM LACTATE, POTASSIUM CHLORIDE, CALCIUM CHLORIDE 600; 310; 30; 20 MG/100ML; MG/100ML; MG/100ML; MG/100ML
INJECTION, SOLUTION INTRAVENOUS CONTINUOUS PRN
Status: DISCONTINUED | OUTPATIENT
Start: 2022-09-20 | End: 2022-09-20

## 2022-09-20 RX ORDER — ONDANSETRON 4 MG/1
4 TABLET, ORALLY DISINTEGRATING ORAL EVERY 30 MIN PRN
Status: DISCONTINUED | OUTPATIENT
Start: 2022-09-20 | End: 2022-09-20 | Stop reason: HOSPADM

## 2022-09-20 RX ORDER — FENTANYL CITRATE 50 UG/ML
INJECTION, SOLUTION INTRAMUSCULAR; INTRAVENOUS PRN
Status: DISCONTINUED | OUTPATIENT
Start: 2022-09-20 | End: 2022-09-20

## 2022-09-20 RX ORDER — LABETALOL HYDROCHLORIDE 5 MG/ML
10 INJECTION, SOLUTION INTRAVENOUS EVERY 10 MIN PRN
Status: DISCONTINUED | OUTPATIENT
Start: 2022-09-20 | End: 2022-09-20 | Stop reason: HOSPADM

## 2022-09-20 RX ORDER — ACETAMINOPHEN 650 MG/1
650 SUPPOSITORY RECTAL EVERY 4 HOURS PRN
Status: DISCONTINUED | OUTPATIENT
Start: 2022-09-20 | End: 2022-09-23 | Stop reason: HOSPADM

## 2022-09-20 RX ORDER — ACETAMINOPHEN 325 MG/1
975 TABLET ORAL EVERY 8 HOURS PRN
Status: DISCONTINUED | OUTPATIENT
Start: 2022-09-20 | End: 2022-09-23 | Stop reason: HOSPADM

## 2022-09-20 RX ORDER — DEXTROSE MONOHYDRATE, SODIUM CHLORIDE, AND POTASSIUM CHLORIDE 50; 1.49; 9 G/1000ML; G/1000ML; G/1000ML
100 INJECTION, SOLUTION INTRAVENOUS CONTINUOUS
Status: DISCONTINUED | OUTPATIENT
Start: 2022-09-20 | End: 2022-09-23 | Stop reason: HOSPADM

## 2022-09-20 RX ORDER — NALOXONE HYDROCHLORIDE 0.4 MG/ML
0.4 INJECTION, SOLUTION INTRAMUSCULAR; INTRAVENOUS; SUBCUTANEOUS
Status: DISCONTINUED | OUTPATIENT
Start: 2022-09-20 | End: 2022-09-23 | Stop reason: HOSPADM

## 2022-09-20 RX ORDER — DEXTROSE MONOHYDRATE 25 G/50ML
25-50 INJECTION, SOLUTION INTRAVENOUS
Status: DISCONTINUED | OUTPATIENT
Start: 2022-09-20 | End: 2022-09-23 | Stop reason: HOSPADM

## 2022-09-20 RX ORDER — CEFAZOLIN SODIUM 1 G/3ML
INJECTION, POWDER, FOR SOLUTION INTRAMUSCULAR; INTRAVENOUS PRN
Status: DISCONTINUED | OUTPATIENT
Start: 2022-09-20 | End: 2022-09-20

## 2022-09-20 RX ORDER — DIAZEPAM 10 MG/2ML
2.5 INJECTION, SOLUTION INTRAMUSCULAR; INTRAVENOUS
Status: DISCONTINUED | OUTPATIENT
Start: 2022-09-20 | End: 2022-09-20 | Stop reason: HOSPADM

## 2022-09-20 RX ORDER — PROPOFOL 10 MG/ML
INJECTION, EMULSION INTRAVENOUS PRN
Status: DISCONTINUED | OUTPATIENT
Start: 2022-09-20 | End: 2022-09-20

## 2022-09-20 RX ORDER — ALBUTEROL SULFATE 0.83 MG/ML
2.5 SOLUTION RESPIRATORY (INHALATION) EVERY 4 HOURS PRN
Status: DISCONTINUED | OUTPATIENT
Start: 2022-09-20 | End: 2022-09-20 | Stop reason: HOSPADM

## 2022-09-20 RX ORDER — HYDROMORPHONE HCL IN WATER/PF 6 MG/30 ML
0.2 PATIENT CONTROLLED ANALGESIA SYRINGE INTRAVENOUS
Status: DISCONTINUED | OUTPATIENT
Start: 2022-09-20 | End: 2022-09-23 | Stop reason: HOSPADM

## 2022-09-20 RX ORDER — HYDROMORPHONE HYDROCHLORIDE 2 MG/1
2 TABLET ORAL
Status: DISCONTINUED | OUTPATIENT
Start: 2022-09-20 | End: 2022-09-23 | Stop reason: HOSPADM

## 2022-09-20 RX ORDER — NEOSTIGMINE METHYLSULFATE 1 MG/ML
VIAL (ML) INJECTION PRN
Status: DISCONTINUED | OUTPATIENT
Start: 2022-09-20 | End: 2022-09-20

## 2022-09-20 RX ORDER — KETOROLAC TROMETHAMINE 30 MG/ML
30 INJECTION, SOLUTION INTRAMUSCULAR; INTRAVENOUS EVERY 6 HOURS PRN
Status: DISCONTINUED | OUTPATIENT
Start: 2022-09-20 | End: 2022-09-23 | Stop reason: HOSPADM

## 2022-09-20 RX ORDER — HYDROMORPHONE HCL IN WATER/PF 6 MG/30 ML
0.4 PATIENT CONTROLLED ANALGESIA SYRINGE INTRAVENOUS EVERY 5 MIN PRN
Status: DISCONTINUED | OUTPATIENT
Start: 2022-09-20 | End: 2022-09-20 | Stop reason: HOSPADM

## 2022-09-20 RX ORDER — NICOTINE POLACRILEX 4 MG
15-30 LOZENGE BUCCAL
Status: DISCONTINUED | OUTPATIENT
Start: 2022-09-20 | End: 2022-09-23 | Stop reason: HOSPADM

## 2022-09-20 RX ORDER — ATROPA BELLADONNA AND OPIUM 16.2; 3 MG/1; MG/1
SUPPOSITORY RECTAL PRN
Status: DISCONTINUED | OUTPATIENT
Start: 2022-09-20 | End: 2022-09-20 | Stop reason: HOSPADM

## 2022-09-20 RX ORDER — ONDANSETRON 2 MG/ML
4 INJECTION INTRAMUSCULAR; INTRAVENOUS EVERY 6 HOURS PRN
Status: DISCONTINUED | OUTPATIENT
Start: 2022-09-20 | End: 2022-09-23 | Stop reason: HOSPADM

## 2022-09-20 RX ORDER — OXYCODONE HYDROCHLORIDE 5 MG/1
5 TABLET ORAL EVERY 4 HOURS PRN
Status: DISCONTINUED | OUTPATIENT
Start: 2022-09-20 | End: 2022-09-20 | Stop reason: HOSPADM

## 2022-09-20 RX ORDER — NALOXONE HYDROCHLORIDE 0.4 MG/ML
0.2 INJECTION, SOLUTION INTRAMUSCULAR; INTRAVENOUS; SUBCUTANEOUS
Status: DISCONTINUED | OUTPATIENT
Start: 2022-09-20 | End: 2022-09-23 | Stop reason: HOSPADM

## 2022-09-20 RX ORDER — SODIUM CHLORIDE, SODIUM LACTATE, POTASSIUM CHLORIDE, CALCIUM CHLORIDE 600; 310; 30; 20 MG/100ML; MG/100ML; MG/100ML; MG/100ML
INJECTION, SOLUTION INTRAVENOUS CONTINUOUS
Status: DISCONTINUED | OUTPATIENT
Start: 2022-09-20 | End: 2022-09-20 | Stop reason: HOSPADM

## 2022-09-20 RX ORDER — DEXAMETHASONE SODIUM PHOSPHATE 4 MG/ML
4 INJECTION, SOLUTION INTRA-ARTICULAR; INTRALESIONAL; INTRAMUSCULAR; INTRAVENOUS; SOFT TISSUE
Status: DISCONTINUED | OUTPATIENT
Start: 2022-09-20 | End: 2022-09-20

## 2022-09-20 RX ORDER — MEPERIDINE HYDROCHLORIDE 25 MG/ML
12.5 INJECTION INTRAMUSCULAR; INTRAVENOUS; SUBCUTANEOUS EVERY 5 MIN PRN
Status: DISCONTINUED | OUTPATIENT
Start: 2022-09-20 | End: 2022-09-20 | Stop reason: HOSPADM

## 2022-09-20 RX ORDER — CEFTRIAXONE 1 G/1
1 INJECTION, POWDER, FOR SOLUTION INTRAMUSCULAR; INTRAVENOUS EVERY 24 HOURS
Status: DISCONTINUED | OUTPATIENT
Start: 2022-09-20 | End: 2022-09-23 | Stop reason: HOSPADM

## 2022-09-20 RX ORDER — LIDOCAINE 40 MG/G
CREAM TOPICAL
Status: DISCONTINUED | OUTPATIENT
Start: 2022-09-20 | End: 2022-09-23 | Stop reason: HOSPADM

## 2022-09-20 RX ORDER — PROCHLORPERAZINE 25 MG
25 SUPPOSITORY, RECTAL RECTAL EVERY 12 HOURS PRN
Status: DISCONTINUED | OUTPATIENT
Start: 2022-09-20 | End: 2022-09-23 | Stop reason: HOSPADM

## 2022-09-20 RX ADMIN — FENTANYL CITRATE 50 MCG: 50 INJECTION, SOLUTION INTRAMUSCULAR; INTRAVENOUS at 15:14

## 2022-09-20 RX ADMIN — HYDROMORPHONE HYDROCHLORIDE 0.2 MG: 0.2 INJECTION, SOLUTION INTRAMUSCULAR; INTRAVENOUS; SUBCUTANEOUS at 07:48

## 2022-09-20 RX ADMIN — CEFAZOLIN 2 G: 1 INJECTION, POWDER, FOR SOLUTION INTRAMUSCULAR; INTRAVENOUS at 15:30

## 2022-09-20 RX ADMIN — HYDROMORPHONE HYDROCHLORIDE 0.2 MG: 0.2 INJECTION, SOLUTION INTRAMUSCULAR; INTRAVENOUS; SUBCUTANEOUS at 17:43

## 2022-09-20 RX ADMIN — EPHEDRINE SULFATE 5 MG: 50 INJECTION, SOLUTION INTRAVENOUS at 15:31

## 2022-09-20 RX ADMIN — ONDANSETRON 4 MG: 2 INJECTION INTRAMUSCULAR; INTRAVENOUS at 10:00

## 2022-09-20 RX ADMIN — FENTANYL CITRATE 50 MCG: 50 INJECTION, SOLUTION INTRAMUSCULAR; INTRAVENOUS at 15:46

## 2022-09-20 RX ADMIN — POTASSIUM CHLORIDE, DEXTROSE MONOHYDRATE AND SODIUM CHLORIDE 100 ML/HR: 150; 5; 900 INJECTION, SOLUTION INTRAVENOUS at 13:02

## 2022-09-20 RX ADMIN — NEOSTIGMINE METHYLSULFATE 3 MG: 1 INJECTION, SOLUTION INTRAVENOUS at 16:09

## 2022-09-20 RX ADMIN — PROPOFOL 180 MG: 10 INJECTION, EMULSION INTRAVENOUS at 15:14

## 2022-09-20 RX ADMIN — HYDROMORPHONE HYDROCHLORIDE 0.2 MG: 0.2 INJECTION, SOLUTION INTRAMUSCULAR; INTRAVENOUS; SUBCUTANEOUS at 23:01

## 2022-09-20 RX ADMIN — ROCURONIUM BROMIDE 30 MG: 50 INJECTION, SOLUTION INTRAVENOUS at 15:15

## 2022-09-20 RX ADMIN — DIATRIZOATE MEGLUMINE AND DIATRIZOATE SODIUM 75 ML: 660; 100 SOLUTION ORAL; RECTAL at 09:54

## 2022-09-20 RX ADMIN — FENTANYL CITRATE 50 MCG: 50 INJECTION, SOLUTION INTRAMUSCULAR; INTRAVENOUS at 16:56

## 2022-09-20 RX ADMIN — FENTANYL CITRATE 50 MCG: 50 INJECTION, SOLUTION INTRAMUSCULAR; INTRAVENOUS at 16:40

## 2022-09-20 RX ADMIN — HYDROMORPHONE HYDROCHLORIDE 0.2 MG: 0.2 INJECTION, SOLUTION INTRAMUSCULAR; INTRAVENOUS; SUBCUTANEOUS at 09:55

## 2022-09-20 RX ADMIN — CEFTRIAXONE 1 G: 1 INJECTION, POWDER, FOR SOLUTION INTRAMUSCULAR; INTRAVENOUS at 20:02

## 2022-09-20 RX ADMIN — POTASSIUM CHLORIDE, DEXTROSE MONOHYDRATE AND SODIUM CHLORIDE 100 ML/HR: 150; 5; 900 INJECTION, SOLUTION INTRAVENOUS at 17:38

## 2022-09-20 RX ADMIN — GLYCOPYRROLATE 0.2 MG: 0.2 INJECTION, SOLUTION INTRAMUSCULAR; INTRAVENOUS at 15:20

## 2022-09-20 RX ADMIN — ACETAMINOPHEN 975 MG: 325 TABLET, FILM COATED ORAL at 20:27

## 2022-09-20 RX ADMIN — SODIUM CHLORIDE, POTASSIUM CHLORIDE, SODIUM LACTATE AND CALCIUM CHLORIDE: 600; 310; 30; 20 INJECTION, SOLUTION INTRAVENOUS at 15:10

## 2022-09-20 RX ADMIN — PANTOPRAZOLE SODIUM 40 MG: 40 INJECTION, POWDER, FOR SOLUTION INTRAVENOUS at 07:48

## 2022-09-20 RX ADMIN — KETOROLAC TROMETHAMINE 30 MG: 30 INJECTION, SOLUTION INTRAMUSCULAR at 19:16

## 2022-09-20 RX ADMIN — ONDANSETRON HYDROCHLORIDE 4 MG: 2 INJECTION, SOLUTION INTRAVENOUS at 15:45

## 2022-09-20 RX ADMIN — POTASSIUM CHLORIDE, DEXTROSE MONOHYDRATE AND SODIUM CHLORIDE 100 ML/HR: 150; 5; 900 INJECTION, SOLUTION INTRAVENOUS at 01:54

## 2022-09-20 RX ADMIN — INSULIN ASPART 1 UNITS: 100 INJECTION, SOLUTION INTRAVENOUS; SUBCUTANEOUS at 09:16

## 2022-09-20 RX ADMIN — LIDOCAINE HYDROCHLORIDE 50 MG: 10 INJECTION, SOLUTION INFILTRATION; PERINEURAL at 15:14

## 2022-09-20 RX ADMIN — HYDROMORPHONE HYDROCHLORIDE 0.2 MG: 0.2 INJECTION, SOLUTION INTRAMUSCULAR; INTRAVENOUS; SUBCUTANEOUS at 00:59

## 2022-09-20 RX ADMIN — SODIUM CHLORIDE, POTASSIUM CHLORIDE, SODIUM LACTATE AND CALCIUM CHLORIDE: 600; 310; 30; 20 INJECTION, SOLUTION INTRAVENOUS at 16:09

## 2022-09-20 RX ADMIN — PROPOFOL 75 MCG/KG/MIN: 10 INJECTION, EMULSION INTRAVENOUS at 15:28

## 2022-09-20 RX ADMIN — HYDROMORPHONE HYDROCHLORIDE 0.2 MG: 0.2 INJECTION, SOLUTION INTRAMUSCULAR; INTRAVENOUS; SUBCUTANEOUS at 20:02

## 2022-09-20 RX ADMIN — SODIUM CHLORIDE, POTASSIUM CHLORIDE, SODIUM LACTATE AND CALCIUM CHLORIDE: 600; 310; 30; 20 INJECTION, SOLUTION INTRAVENOUS at 13:53

## 2022-09-20 RX ADMIN — EPHEDRINE SULFATE 5 MG: 50 INJECTION, SOLUTION INTRAVENOUS at 16:01

## 2022-09-20 RX ADMIN — GLYCOPYRROLATE 0.4 MG: 0.2 INJECTION, SOLUTION INTRAMUSCULAR; INTRAVENOUS at 16:09

## 2022-09-20 ASSESSMENT — ACTIVITIES OF DAILY LIVING (ADL)
ADLS_ACUITY_SCORE: 22
ADLS_ACUITY_SCORE: 37
ADLS_ACUITY_SCORE: 22
ADLS_ACUITY_SCORE: 22
ADLS_ACUITY_SCORE: 37
ADLS_ACUITY_SCORE: 22
ADLS_ACUITY_SCORE: 37
ADLS_ACUITY_SCORE: 22
ADLS_ACUITY_SCORE: 37
ADLS_ACUITY_SCORE: 22
ADLS_ACUITY_SCORE: 37
ADLS_ACUITY_SCORE: 22

## 2022-09-20 NOTE — ANESTHESIA PREPROCEDURE EVALUATION
Anesthesia Pre-Procedure Evaluation    Patient: Lynn Thompson   MRN: [id number] : 1965        Procedure : Procedure(s):  CYSTOSCOPY, WITH RETROGRADE PYELOGRAM AND URETERAL STENT INSERTION          Past Medical History:   Diagnosis Date     Benign paroxysmal positional vertigo     occ.      Calculus of kidney 05/2005    x1 on L side passed, several stones.  Has been tested for oxalate.     Chronic abdominal pain 2013     Chronic pancreatitis 2013     Depression     also occ panic spells     Diabetes (H) 5/10/2013    Total Pancreatomy with Auto Islets Transplant     Dyspepsia 1999    H. pylori   treated     Headaches     still periodic HA's ;  often 5X/week     Hypertension 2016    Stress related     Iron deficiency anemia 2003    relates to gastric bypass     Post-pancreatectomy diabetes melltius 2013     Sleep apnea     uses splint     Spasm of sphincter of Oddi     surgical + endoscopic stenting of pancreatic duct @ [facility] 06     Thyroid nodule 2016      Past Surgical History:   Procedure Laterality Date     ABDOMINOPLASTY  2002    Tummy tuck     APPENDECTOMY  1990     BUNIONECTOMY Right 1998     CBD Stent placement  2002    CBD stent; Dr. Presley      SECTION       CHOLECYSTECTOMY       COLONOSCOPY N/A 2021    Procedure: COLONOSCOPY INCOMPLETE Aborted due to incomplete prep  will need to take additional prep and return tomorrow 21;  Surgeon: Ihsan Saenz MD;  Location: [facility]     COMBINED CYSTOSCOPY, RETROGRADES, URETEROSCOPY, LASER HOLMIUM LITHOTRIPSY URETER(S), INSERT STENT Right 2015    Procedure: COMBINED CYSTOSCOPY, RETROGRADES, URETEROSCOPY, LASER HOLMIUM LITHOTRIPSY URETER(S), INSERT STENT;  Surgeon: Kennedi Aldana MD;  Location: [facility]     COMBINED CYSTOSCOPY, RETROGRADES, URETEROSCOPY, LASER HOLMIUM LITHOTRIPSY URETER(S), INSERT STENT Right 2015    Procedure: COMBINED CYSTOSCOPY, RETROGRADES,  URETEROSCOPY, LASER HOLMIUM LITHOTRIPSY URETER(S), INSERT STENT;  Surgeon: Kennedi Aldana MD;  Location: [facility]     COSMETIC SURGERY  6/24/2002    Tummy tuck     CYSTECTOMY OVARIAN BENIGN Right      CYSTOSCOPY, RETROGRADES, INSERT STENT URETER(S), COMBINED  10/02/2012    Procedure: COMBINED CYSTOSCOPY, RETROGRADES, INSERT STENT URETER(S);  COMBINED CYSTOSCOPY,  , INSERT LEFT STENT URETER;  Surgeon: Johny Baez MD;  Location: [facility]     ESOPHAGOSCOPY, GASTROSCOPY, DUODENOSCOPY (EGD), COMBINED N/A 01/24/2018    Procedure: COMBINED ESOPHAGOSCOPY, GASTROSCOPY, DUODENOSCOPY (EGD);  ESOPHAGOSCOPY, GASTROSCOPY, DUODENOSCOPY (EGD)    ;  Surgeon: Tamir Rodgers MD;  Location: [facility]     EXTRACORPOREAL SHOCK WAVE LITHOTRIPSY (ESWL)  10/16/2012    Procedure: EXTRACORPOREAL SHOCK WAVE LITHOTRIPSY (ESWL);  left EXTRACORPOREAL SHOCK WAVE LITHOTRIPSY (ESWL) ;  Surgeon: Johny Baez MD;  Location: [facility]     Gastric bypass NOS       HERNIA REPAIR  02/2015     HYSTERECTOMY SUPRACERVICAL, BILATERAL SALPINGO-OOPHORECTOMY, COMBINED N/A 02/01/2022    Procedure: Abdominal supracervical hysterectomy, bilateral salpingooophrectomy;  Surgeon: Nicole Rivera MD;  [facility]     IRRIGATION AND DEBRIDEMENT HAND, COMBINED Left 10/30/2020    Procedure: Left hand sharp excisional debridement of skin, subcutaneous tissue and fat with a scalpel, 2 x 1 x 1 cm.;  Surgeon: Demian Renteria MD;  Location: [facility]     LAPAROSCOPIC LYSIS ADHESIONS N/A 02/20/2015    Procedure: LAPAROSCOPIC LYSIS ADHESIONS;  Surgeon: Aaron Early MD;  [facility]     LAPAROSCOPIC LYSIS ADHESIONS N/A 12/29/2015    Procedure: LAPAROSCOPIC LYSIS ADHESIONS;  Surgeon: Aaron Early MD;  [facility]     PANCREATECTOMY, TRANSPLANT AUTO ISLET CELL, COMBINED  05/10/2013    Procedure: COMBINED PANCREATECTOMY, TRANSPLANT AUTO ISLET CELL;  Pancreatectomy, Auto Islet Cell Transplant   hernia repair, jejunostomy tube and liver biopsies  with Anesthesia General with block;  Surgeon: Aaron Early MD;  [facility]     Partial ileum resection  1992     RECTOPEXY ABDOMINAL N/A 02/01/2022    Procedure: RECTOPEXY, ABDOMINAL;  Surgeon: Uriah Sheridan MD;  [facility]     REPAIR PTOSIS BROW BILATERAL Bilateral 06/09/2020    Procedure: BILATERAL BROW PTOSIS REPAIR;  Surgeon: Denise Alberts MD;  Location: [facility]     SACROCOLPOPEXY, CYSTOSCOPY, COMBINED N/A 02/01/2022    Procedure: Uterosacral ligament suspension, pina colposuspension with Cystoscopy;  Surgeon: Nicole Rviera MD;  [facility]     SIGMOIDOSCOPY FLEXIBLE N/A 02/01/2022    Procedure: SIGMOIDOSCOPY, FLEXIBLE;  Surgeon: Uriah Sheridan MD;  [facility]     Surgery for SBO  2015     TONSILLECTOMY, ADENOIDECTOMY, COMBINED  1997     TRANSPLANT  5/10/13    Pancreatic Auto-Islet Transplant      Allergies   Allergen Reactions     Corticosteroids Other (See Comments)     All oral, IV and injectable steroids are contraindicated (unless in life threatening situations) in Islet Auto transplant recipients. They can cause irreversible loss of islet cell function. Please contact patient's transplant care coordinator, Erlinda Multani RN BSN at [phone]/pager: [phone] and endocrinologist prior to administration.       Povidone Iodine Hives     Causes skin to blister     Nsaids      naprosyn = GI upset     Sulfasalazine Nausea and Nausea and Vomiting      Social History     Tobacco Use     Smoking status: Never Smoker     Smokeless tobacco: Never Used   Substance Use Topics     Alcohol use: Not Currently      Wt Readings from Last 1 Encounters:   09/20/22 52.9 kg (116 lb 9.6 oz)        Anesthesia Evaluation   Pt has had prior anesthetic. Type: General.    No history of anesthetic complications       ROS/MED HX  ENT/Pulmonary: Comment: Uses oral splint    (+) sleep apnea, doesn't use CPAP,     Neurologic:  - neg neurologic ROS     Cardiovascular:   - neg cardiovascular ROS   (+) hypertension-----    METS/Exercise Tolerance:     Hematologic:  - neg hematologic  ROS     Musculoskeletal:  - neg musculoskeletal ROS     GI/Hepatic:  - neg GI/hepatic ROS     Renal/Genitourinary:     (+) Nephrolithiasis ,     Endo: Comment: DM post pancreatectomy 2013 - neg endo ROS   (+) type I DM, thyroid problem, hypothyroidism, Obesity,     Psychiatric/Substance Use:  - neg psychiatric ROS     Infectious Disease:  - neg infectious disease ROS     Malignancy:  - neg malignancy ROS     Other:  - neg other ROS          Physical Exam    Airway        Mallampati: I   TM distance: > 3 FB   Neck ROM: full   Mouth opening: > 3 cm    Respiratory Devices and Support         Dental  no notable dental history         Cardiovascular   cardiovascular exam normal          Pulmonary   pulmonary exam normal                OUTSIDE LABS:  CBC:   Lab Results   Component Value Date    WBC 6.8 09/20/2022    WBC 12.4 (H) 09/19/2022    HGB 11.0 (L) 09/20/2022    HGB 13.0 09/19/2022    HCT 35.7 09/20/2022    HCT 42.3 09/19/2022     09/20/2022     09/19/2022     BMP:   Lab Results   Component Value Date     09/20/2022     09/19/2022    POTASSIUM 4.9 09/20/2022    POTASSIUM 4.5 09/19/2022    CHLORIDE 107 09/20/2022    CHLORIDE 102 09/19/2022    CO2 25 09/20/2022    CO2 28 09/19/2022    BUN 18.7 09/20/2022    BUN 24.3 (H) 09/19/2022    CR 0.67 09/20/2022    CR 0.80 09/19/2022     (H) 09/20/2022     (H) 09/20/2022     COAGS:   Lab Results   Component Value Date    PTT 36 05/16/2013    INR 1.03 05/10/2016    FIBR 168 (L) 05/10/2013     POC:   Lab Results   Component Value Date     (H) 04/01/2021    HCG Negative 05/21/2014    HCGS Negative 12/30/2015     HEPATIC:   Lab Results   Component Value Date    ALBUMIN 4.2 09/19/2022    PROTTOTAL 7.1 09/19/2022    ALT 56 (H) 09/19/2022    AST 44 (H) 09/19/2022    ALKPHOS 151 (H) 09/19/2022    BILITOTAL <0.2 09/19/2022      OTHER:   Lab Results   Component Value Date    PH 7.36 05/10/2013    LACT 1.3 09/19/2022    A1C 7.7 (H) 09/15/2022    VALARIE 8.8 09/20/2022    PHOS 2.7 02/03/2022    MAG 1.6 02/04/2022    LIPASE 10 (L) 11/23/2021    AMYLASE 268 (H) 05/11/2013    TSH 2.75 08/17/2021    T4 1.06 08/17/2021    T3 64 08/17/2021    CRP 21.8 (H) 10/30/2020    SED 13 10/30/2020       Anesthesia Plan    ASA Status:  3   NPO Status:  NPO Appropriate    Anesthesia Type: General.     - Airway: LMA   Induction: Intravenous, Propofol.   Maintenance: Balanced.        Consents    Anesthesia Plan(s) and associated risks, benefits, and realistic alternatives discussed. Questions answered and patient/representative(s) expressed understanding.    - Discussed:     - Discussed with:  Patient         Postoperative Care    Pain management: IV analgesics, Oral pain medications, Multi-modal analgesia.   PONV prophylaxis: Ondansetron (or other 5HT-3), Dexamethasone or Solumedrol     Comments:                Socrates Hayward MD

## 2022-09-20 NOTE — ANESTHESIA PROCEDURE NOTES
Airway         Procedure Start/Stop Times: 9/20/2022 3:17 PM  Staff -        CRNA: Darlene Kaur APRN CRNA       Performed By: CRNAIndications and Patient Condition       Indications for airway management: sammy-procedural       Induction type:intravenous       Mask difficulty assessment: 1 - vent by mask    Final Airway Details       Final airway type: endotracheal airway       Successful airway: ETT - single  Endotracheal Airway Details        ETT size (mm): 7.0       Cuffed: yes       Successful intubation technique: direct laryngoscopy       DL Blade Type: MAC 3       Grade View of Cords: 1       Adjucts: stylet       Bite block used: Soft    Post intubation assessment        Placement verified by: capnometry        Number of attempts at approach: 1       Secured with: plastic tape       Ease of procedure: easy       Dentition: Intact    Medication(s) Administered   Medication Administration Time: 9/20/2022 3:17 PM

## 2022-09-20 NOTE — BRIEF OP NOTE
[facility]    Brief Operative Note    Pre-operative diagnosis: Pyuria [R82.81]  Ureteral stone [N20.1]  Post-operative diagnosis Same as pre-operative diagnosis    Procedure: Procedure(s):  CYSTOSCOPY, WITH RETROGRADE PYELOGRAM, LEFT URETEROSCOPY, HOLMIUM LASER LITHOTRIPSY OF URETERAL CALCULUS, STONE BASKETING, AND URETERAL STENT INSERTION- LEFT  Surgeon: Surgeon(s) and Role:     * Alonzo Rome MD - Primary  Anesthesia: General   Estimated Blood Loss: 1 mL from 9/20/2022  3:09 PM to 9/20/2022  4:14 PM      Drains: Left ureteral stent  Specimens:   ID Type Source Tests Collected by Time Destination   A : LEFT URETERAL STONES Calculus/Stone Ureter, Left STONE ANALYSIS Alonzo Rome MD 9/20/2022  3:42 PM      Findings:   9 mm left proximal ureteral stone, laser fragmented and basketed, 99% stone clearance, stent placed  Complications: None.  Implants:   Implant Name Type Inv. Item Serial No.  Lot No. LRB No. Used Action   STENT URETERAL POLARIS ULTRA [id number] - [id number] Stent STENT URETERAL POLARIS ULTRA [id number]  [employer] [id number] Left 1 Implanted

## 2022-09-20 NOTE — ED PROVIDER NOTES
Sign Out Note    I took over care of this patient from Dr. Escobedo    Briefly, patient presented to the ED for: abd pain, found to have large ureteral stone and SBO, admitted to hospitalist    Plan at time of sign out: boarding on hospitalist service, call placed by Dr. Escobedo to Urology to confirm plan    Events during my shift: At 0700, I spoke with Dr. Yuniel Victor, Urology, who is already aware of pt and will plan to take pt to OR some time today.    MD Bruno Pena, Rogerio Guillen MD  09/20/22 9022

## 2022-09-20 NOTE — H&P
[facility]    History and Physical - Hospitalist Service       Date of Admission:  9/19/2022    Assessment & Plan      Lynn Thompson is a 57 year old female admitted on 9/19/2022. She has a very complex past medical history and presents with abdominal pain.  She reports feeling mildly unwell over the weekend.  At 4 AM this morning she developed diffuse abdominal pain and also some vomiting.  She describes it as sharp and radiating to her back.  She also has some abdominal distention.  She did have a bowel movement today.  She has had no difficulty urinating.  She has had several abdominal surgeries in the past and has had 3 or 4 small bowel obstructions as well as lysis of adhesions.  She is also had several kidney stones in the past and required lithotripsies.  In the emergency department she has had no further vomiting.  Vital signs are stable and labs are unremarkable.  Urinalysis showed 38 white cells.  CT of the abdomen and pelvis showed multiple previous surgical changes, a 9 x 4 mm left proximal ureteral stone and mildly dilated loops of small bowel as well as large amount of stool in the colon.  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    1.  Ureteral stone:  -- This seems most likely source of her pain as her pain had fairly sudden onset.  -- Remain n.p.o. and hydrate with IV fluids  -- Urology consultation to consider ureteroscopy.  -- Pain medications as needed  -- She does have some pyuria and will treat for now with antibiotics    2.  Possible partial small bowel obstruction/ileus:  -- CT showed mildly dilated loops of  small bowel.  Unclear if this is an acute or more chronic finding.  -- Her presenting symptoms could be secondary to ureteral stone and or partial small bowel obstruction.  -- She is not having any vomiting in the emergency department.  -- We will treat the ureteral stone and see if her symptoms improve.  -- Request general surgery consultation.  If she required surgery for bowel obstruction she would need to be transferred to [facility].    3.  Diabetes mellitus:  -- She has been on low-dose Lantus insulin recently despite islet cell transplant.  -- We will treat with sliding scale insulin for now while n.p.o.    4.  Sleep apnea:  -- Resume CPAP         Diet:  N.p.o.  DVT Prophylaxis: Pneumatic Compression Devices  Gonzalez Catheter: Not present  Central Lines: None  Cardiac Monitoring: None  Code Status:  Full    Clinically Significant Risk Factors Present on Admission                          Disposition Plan      Expected Discharge Date: 09/21/2022                The patient's care was discussed with the ER Team.    Jaguar Bravo MD  Hospitalist Service  [facility]  Securely message with the Vocera Web Console (learn more here)  Text page via [employer] Paging/Directory         ______________________________________________________________________    Chief Complaint   Abdominal pain    History is obtained from the patient    History of Present Illness   Lynn Thompson is a 57 year old female with a very complex past medical history who presents with abdominal pain.  She reports feeling mildly unwell over the weekend.  At 4 AM this morning she developed diffuse abdominal pain and also some vomiting.  She describes it as sharp and radiating to her back.  She also has some abdominal distention.  She did have a bowel movement today.  She has had no difficulty urinating.  She has had several abdominal surgeries in the past and has had 3 or 4 small bowel obstructions as well as  lysis of adhesions.  She is also had several kidney stones in the past and required lithotripsies.    Review of Systems    A complete 10 point review of systems was attempted and was negative except for the pertinent positives which can be seen in history of present illness    Past Medical History    I have reviewed this patient's medical history and updated it with pertinent information if needed.   Past Medical History:   Diagnosis Date     Benign paroxysmal positional vertigo     occ.      Calculus of kidney 05/2005    x1 on L side passed, several stones.  Has been tested for oxalate.     Chronic abdominal pain 2013     Chronic pancreatitis 2013     Depression     also occ panic spells     Depressive disorder      Diabetes (H) 5/10/2013    Total Pancreatomy with Auto Islets Transplant     Dyspepsia 1999    H. pylori   treated     Headaches     still periodic HA's ;  often 5X/week     Hypertension 2016    Stress related     Iron deficiency anemia 2003    relates to gastric bypass     Post-pancreatectomy diabetes melltius 2013     Sleep apnea     uses splint     Spasm of sphincter of Oddi     surgical + endoscopic stenting of pancreatic duct @ [facility] 06     Thyroid nodule 2016       Past Surgical History   I have reviewed this patient's surgical history and updated it with pertinent information if needed.  Past Surgical History:   Procedure Laterality Date     ABDOMINOPLASTY  2002    Tummy tuck     APPENDECTOMY       BUNIONECTOMY Right 1998     CBD Stent placement  2002    CBD stent; Dr. Presley      SECTION       CHOLECYSTECTOMY       COLONOSCOPY N/A 2021    Procedure: COLONOSCOPY INCOMPLETE Aborted due to incomplete prep  will need to take additional prep and return tomorrow 21;  Surgeon: Ihsan Saenz MD;  Location:  GI     COMBINED CYSTOSCOPY, RETROGRADES, URETEROSCOPY, LASER HOLMIUM LITHOTRIPSY URETER(S), INSERT STENT Right  03/23/2015    Procedure: COMBINED CYSTOSCOPY, RETROGRADES, URETEROSCOPY, LASER HOLMIUM LITHOTRIPSY URETER(S), INSERT STENT;  Surgeon: Kennedi Aldana MD;  Location: [facility] OR     COMBINED CYSTOSCOPY, RETROGRADES, URETEROSCOPY, LASER HOLMIUM LITHOTRIPSY URETER(S), INSERT STENT Right 04/20/2015    Procedure: COMBINED CYSTOSCOPY, RETROGRADES, URETEROSCOPY, LASER HOLMIUM LITHOTRIPSY URETER(S), INSERT STENT;  Surgeon: Kennedi Aldana MD;  Location: [facility] OR     COSMETIC SURGERY  6/24/2002    Tummy tuck     CYSTECTOMY OVARIAN BENIGN Right      CYSTOSCOPY, RETROGRADES, INSERT STENT URETER(S), COMBINED  10/02/2012    Procedure: COMBINED CYSTOSCOPY, RETROGRADES, INSERT STENT URETER(S);  COMBINED CYSTOSCOPY,  , INSERT LEFT STENT URETER;  Surgeon: Johny Baez MD;  Location: [facility] OR     ESOPHAGOSCOPY, GASTROSCOPY, DUODENOSCOPY (EGD), COMBINED N/A 01/24/2018    Procedure: COMBINED ESOPHAGOSCOPY, GASTROSCOPY, DUODENOSCOPY (EGD);  ESOPHAGOSCOPY, GASTROSCOPY, DUODENOSCOPY (EGD)    ;  Surgeon: Tamir Rodgers MD;  Location: [facility] GI     EXTRACORPOREAL SHOCK WAVE LITHOTRIPSY (ESWL)  10/16/2012    Procedure: EXTRACORPOREAL SHOCK WAVE LITHOTRIPSY (ESWL);  left EXTRACORPOREAL SHOCK WAVE LITHOTRIPSY (ESWL) ;  Surgeon: Johny Baez MD;  Location: [facility] OR     Gastric bypass NOS       HERNIA REPAIR  02/2015     HYSTERECTOMY SUPRACERVICAL, BILATERAL SALPINGO-OOPHORECTOMY, COMBINED N/A 02/01/2022    Procedure: Abdominal supracervical hysterectomy, bilateral salpingooophrectomy;  Surgeon: Nicole Rivera MD;  [facility]     IRRIGATION AND DEBRIDEMENT HAND, COMBINED Left 10/30/2020    Procedure: Left hand sharp excisional debridement of skin, subcutaneous tissue and fat with a scalpel, 2 x 1 x 1 cm.;  Surgeon: Demian Renteria MD;  Location: [facility] OR     LAPAROSCOPIC LYSIS ADHESIONS N/A 02/20/2015    Procedure: LAPAROSCOPIC LYSIS ADHESIONS;  Surgeon: Aaron Early MD;  [facility]     LAPAROSCOPIC LYSIS  ADHESIONS N/A 2015    Procedure: LAPAROSCOPIC LYSIS ADHESIONS;  Surgeon: Aaron Early MD;  [facility]     PANCREATECTOMY, TRANSPLANT AUTO ISLET CELL, COMBINED  05/10/2013    Procedure: COMBINED PANCREATECTOMY, TRANSPLANT AUTO ISLET CELL;  Pancreatectomy, Auto Islet Cell Transplant   hernia repair, jejunostomy tube and liver biopsies with Anesthesia General with block;  Surgeon: Aaron Early MD;  [facility]     Partial ileum resection       RECTOPEXY ABDOMINAL N/A 2022    Procedure: RECTOPEXY, ABDOMINAL;  Surgeon: Uriah Sheridan MD;  [facility]     REPAIR PTOSIS BROW BILATERAL Bilateral 2020    Procedure: BILATERAL BROW PTOSIS REPAIR;  Surgeon: Denise Alberts MD;  Location: [facility] OR     SACROCOLPOPEXY, CYSTOSCOPY, COMBINED N/A 2022    Procedure: Uterosacral ligament suspension, pina colposuspension with Cystoscopy;  Surgeon: Nicole Rivera MD;  [facility]     SIGMOIDOSCOPY FLEXIBLE N/A 2022    Procedure: SIGMOIDOSCOPY, FLEXIBLE;  Surgeon: Uriah Sheridan MD;  [facility]     Surgery for SBO       TONSILLECTOMY, ADENOIDECTOMY, COMBINED  1997     TRANSPLANT  5/10/13    Pancreatic Auto-Islet Transplant       Social History   I have reviewed this patient's social history and updated it with pertinent information if needed.  Social History     Tobacco Use     Smoking status: Never Smoker     Smokeless tobacco: Never Used   Vaping Use     Vaping Use: Never used   Substance Use Topics     Alcohol use: Not Currently     Drug use: Not Currently   --She lives with her  and daughter    Family History   I have reviewed this patient's family history and updated it with pertinent information if needed.  Family History   Problem Relation Age of Onset     Family History Negative Mother      Respiratory Father         COPD;  at 69     Genitourinary Problems Father         kidney stones     Substance Abuse Father       Depression Father      Asthma Father      Heart Disease Paternal Grandfather         M.I.     Coronary Artery Disease Paternal Grandfather      Hyperlipidemia Paternal Grandfather      Genitourinary Problems Brother         multiple brothers with kidney stones     Gastrointestinal Disease Maternal Grandmother         undiagnosed 'gut' issues     Coronary Artery Disease Maternal Grandfather      Hyperlipidemia Maternal Grandfather      Cerebrovascular Disease Paternal Grandmother         At the age of 103     Anxiety Disorder Paternal Grandmother      Osteoporosis Paternal Grandmother      Anxiety Disorder Son      Anxiety Disorder Daughter      Asthma Daughter      Colon Cancer No family hx of        Prior to Admission Medications   Prior to Admission Medications   Prescriptions Last Dose Informant Patient Reported? Taking?   DULoxetine (CYMBALTA) 20 MG capsule 9/19/2022 at Unknown time  Yes Yes   Sig: Take 20 mg by mouth daily   FIASP PENFILL 100 UNIT/ML SOCT   No Yes   Sig: Inject 0.5-4 Units Subcutaneous 4 times daily (with meals and nightly)   Patient taking differently: Inject 0.5-4 Units Subcutaneous 3 times daily (with meals)   Glucagon (GVOKE HYPOPEN 1-PACK) 1 MG/0.2ML SOAJ   No No   Sig: Inject 1 mg Subcutaneous once as needed (for hypoglycemia with loss of consciousness)   calcium carbonate 600 mg-vitamin D 400 units (CALTRATE) 600-400 MG-UNIT per tablet 9/19/2022 at Unknown time  Yes Yes   Sig: Take 1 tablet by mouth daily   escitalopram (LEXAPRO) 20 MG tablet 9/19/2022 at Unknown time  Yes Yes   Sig: Take 20 mg by mouth daily   estradiol (ESTRACE) 0.1 MG/GM vaginal cream Past Month at Unknown time  No Yes   Sig: PLACE 2 GRAMS VAGINALLY 2 TIMES WEEKLY   Patient taking differently: PLACE 2 GRAMS VAGINALLY 2 TIMES WEEKLY  Thursdays and Sundays   famotidine (PEPCID) 40 MG tablet   No Yes   Sig: Take 1 tablet (40 mg) by mouth 2 times daily as needed for heartburn   gabapentin (NEURONTIN) 300 MG capsule  2022 at Unknown time  Yes Yes   Sig: Take 300 mg by mouth nightly as needed   glucose 40 % GEL  Self No No   Sig: Take 15-30 g by mouth every 15 minutes as needed.   hydrOXYzine (ATARAX) 25 MG tablet   Yes Yes   Sig: TAKE 1-2 TABLETS BY MOUTH 2 TIMES DAILY AS NEEDED FOR ANXIETY   insulin glargine (LANTUS PEN) 100 UNIT/ML pen 2022 at Unknown time  No Yes   Sig: Inject 5 Units Subcutaneous At Bedtime Inject 6 units daily   Patient taking differently: Inject 5 Units Subcutaneous At Bedtime Inject 5 units daily   insulin pen needle (32G X 4 MM) 32G X 4 MM miscellaneous   No No   Sig: Use 4-6 pen needles daily to inject subcutaneous insulin   levothyroxine (SYNTHROID/LEVOTHROID) 100 MCG tablet 2022 at Unknown time  No Yes   Sig: Take 1 tablet (100 mcg) by mouth daily   lipase-protease-amylase (VIOKACE) 04976-42656 units TABS tablet 2022 at x1  No Yes   Si with meals and 3 with snacks.  Up to 20 per day supply.   loratadine (CLARITIN) 10 MG tablet  Self Yes Yes   Sig: Take 10 mg by mouth daily as needed    modafinil (PROVIGIL) 200 MG tablet 2022 at Unknown time  Yes Yes   Sig: Take 400 mg by mouth daily   omeprazole (PRILOSEC) 40 MG DR capsule 2022 at x1  No Yes   Sig: Take 1 capsule (40 mg) by mouth 2 times daily Take 30-60 minutes before a meal.   traZODone (DESYREL) 50 MG tablet   Yes Yes   Sig: Take 50 mg by mouth nightly as needed for sleep      Facility-Administered Medications: None     Allergies   Allergies   Allergen Reactions     Corticosteroids Other (See Comments)     All oral, IV and injectable steroids are contraindicated (unless in life threatening situations) in Islet Auto transplant recipients. They can cause irreversible loss of islet cell function. Please contact patient's transplant care coordinator, Erlinda Multani RN BSN at [phone]/pager: [phone] and endocrinologist prior to administration.       Povidone Iodine Hives     Causes skin to blister     Nsaids       naprosyn = GI upset     Sulfasalazine Nausea and Nausea and Vomiting       Physical Exam   Vital Signs: Temp: 97.4  F (36.3  C)   BP: 115/65 Pulse: 55   Resp: 16 SpO2: 97 % O2 Device: None (Room air)    Weight: 0 lbs 0 oz      Vital signs reviewed  General:  Alert, calm, NAD  CV: regular rate and rhythm, no murmurs or rubs  Lungs:  Clear to ascultation bilaterally, normal respiratory effort  HEENT:  Pupil round, equal, conjuctivae, sclerae and lids normal, neck is supple  Abdomen:  Soft, mildly tender, nondistended, no masses, normal bowel sounds  Extremities:  No edema  Neuro: normal strength and sensation in all 4 extremities, cranial nerves grossly intact  Psychiatric:  Mood and affect within normal limits  Skin:  No rashes or wounds evident    Data   Data reviewed today: I reviewed all medications, new labs and imaging results over the last 24 hours.   Recent Labs   Lab 09/19/22  1538 09/15/22  1213 09/15/22  1115 09/15/22  0948   WBC 12.4*  --   --  8.3   HGB 13.0  --   --  13.0   MCV 96  --   --  96     --   --  432     --   --  142   POTASSIUM 4.5  --   --  4.4   CHLORIDE 102  --   --  101   CO2 28  --   --  30*   BUN 24.3*  --   --  21.2   CR 0.80  --   --  0.83   ANIONGAP 11  --   --  11   VALARIE 9.8  --   --  9.9   * 297* 355* 170*  170*   ALBUMIN 4.2  --   --  4.3   PROTTOTAL 7.1  --   --  7.1   BILITOTAL <0.2  --   --  0.2   ALKPHOS 151*  --   --  151*   ALT 56*  --   --  61*   AST 44*  --   --  46*     Recent Results (from the past 24 hour(s))   CT Abdomen Pelvis w Contrast    Narrative    EXAM: CT ABDOMEN PELVIS W CONTRAST  LOCATION: [facility]  DATE/TIME: 9/19/2022 5:27 PM    INDICATION: Abdominal pain.  COMPARISON: CT abdomen pelvis 11/23/2021.  TECHNIQUE: CT scan of the abdomen and pelvis was performed following injection of IV contrast. Multiplanar reformats were obtained. Dose reduction techniques were used.  CONTRAST: 56mL Isovue 370    FINDINGS:    LOWER CHEST: Normal.    HEPATOBILIARY: No focal liver lesions. Prior cholecystectomy with a hepaticojejunostomy. Pneumobilia is unchanged. No biliary ductal dilation.    PANCREAS: Prior pancreatectomy.    SPLEEN: Prior splenectomy.    ADRENAL GLANDS: Normal.    KIDNEYS/BLADDER: Nonobstructing right renal calculi measuring up to 4 mm at the lower pole. No right hydronephrosis. 9 x 4 mm calculus within the proximal left ureter (4/#42), resulting in mild pelviectasis, but no significant caliectasis. Mild cortical   scarring at the upper pole of the left kidney is unchanged. Symmetric renal enhancement. Urinary bladder is decompressed.    BOWEL: Status post gastric bypass and ileocecectomy. Mildly dilated hepatobiliary loops of small bowel are present in the left upper quadrant. Alimentary small bowel loops and proximal common channel are decompressed, however there are multiple mildly   dilated loops of small bowel within the central abdomen, some of which contain fecalized enteric contents. No discrete transition point is identified. Distal ileal loops are normal in caliber, and the ileocecal anastomosis is patent. Large volume stool   is present throughout the colon.    LYMPH NODES: No enlarged lymph nodes.    VASCULATURE: Patent portal and superior mesenteric veins. No mesenteric swirling. Nonaneurysmal abdominal aorta.    PELVIC ORGANS: Uterus is absent.    MUSCULOSKELETAL: Nonobstructive bowel gas pattern. No aggressive bone lesions.      Impression    IMPRESSION:     1.  Partially obstructing 9 x 4 mm calculus within the proximal left ureter, resulting in mild pelviectasis but no significant caliectasis.    2.  Status post gastric bypass, with a probable early or partial mechanical small bowel obstruction. There are multiple mildly dilated loops of small bowel in the midabdomen, some of which contain fecalized contents. Hepatobiliary loops in the left upper   quadrant are also mildly dilated. No discrete  transition point is identified. No signs of bowel compromise.    3.  Large volume stool throughout the colon.    4.  Nonobstructing right renal calculi measuring up to 4 mm.

## 2022-09-20 NOTE — OP NOTE
[facility]  Operative Note    Pre-operative diagnosis: Pyuria [R82.81]  Ureteral stone [N20.1]   Post-operative diagnosis Left ureteral stone   Procedure: Procedure(s):  Cystoscopy  Left ureteroscopy with thulium fiber laser lithotripsy  Left ureteroscopy with stone basketing  Left retrograde pyelogram  Left ureteral stent placement  Fluoroscopic interpretation <1 hour physician time   Surgeon: Alonzo Rome MD   Assistants(s): none   Anesthesia: General    Estimated blood loss: Minimal    Total IV fluids: (See anesthesia record)   Blood transfusion: No transfusion was given during surgery   Total urine output: Not measured   Drains: Left ureteral stent   Specimens: ID Type Source Tests Collected by Time Destination   A : LEFT URETERAL STONES Calculus/Stone Ureter, Left STONE ANALYSIS Alonzo Rome MD 9/20/2022  3:42 PM         Implants: Implant Name Type Inv. Item Serial No.  Lot No. LRB No. Used Action   STENT URETERAL POLARIS ULTRA [id number] - [id number] Stent STENT URETERAL POLARIS ULTRA [id number]  [employer] [id number] Left 1 Implanted          Findings:   9 mm left proximal ureteral stone, laser fragmented and basketed, 99% stone clearance, stent placed   Complications: None.   Condition: Stable             Description of procedure:  58 yo F with complex past medical history including h/o gastric bypass chronic pancreatitis with pancreatectomy and auto islet transplant, numerous episodes of nephrolithiasis requiring treatment in the past, who presents with abdominal pain, nausea and vomiting, found to have 9 mm left proximal ureteral stone as well as dilated loops of small bowel. Her urinalysis was noted for slight pyuria but otherwise her presentation is not overly concerning for infection. I discussed with her plan for left ureteroscopy with laser lithotripsy and stone basketing; if any concern for infection intraop, a stent would be  placed with plan for interval ureteroscopy after treatment of infection. Risks including need for secondary procedure, infection, stent pain and irritation, hematuria, incomplete stone clearance discussed. Informed consent obtained     The patient was brought operating room #14.  She had already received preoperative antibiotics.  General anesthesia was induced, she was placed in dorsal lithotomy position, prepped and draped in standard sterile fashion.  A timeout was performed.    A 22 [place] Storz cystoscope was assembled and passed through the urethra into the bladder.  The left ureteral orifice was identified.  This was cannulated with a 5 [place] tiger tail catheter.  Initial  film showed significant amount of contrast within the ascending colon from prior ingestion of Gastrografin.  A gentle retrograde pyelogram showed a contrast cut off in the proximal ureter at the level of the stone.  A 0.035 inch stiff shaft Glidewire was passed up to the renal pelvis under fluoroscopic guidance and the tiger tail catheter was removed.  The scope was removed and the wire was clipped to the drapes as a safety wire.  A Storz short semirigid ureteroscope was passed up the left ureter and a large proximal ureteral stone was encountered with significant ureteral inflammation around the stone.  An Olympus Soltive thulium fiber laser was then used to fragment the stone into multiple pieces.  The pieces were basketed out using [place] halo basket.  1 piece was seen to retropulse all the way up into the renal pelvis and I was unable to retrieve it with the semirigid ureteroscope.  Therefore I decided to perform flexible ureteroscopy.  A second wire was passed through the scope up into the renal pelvis and the scope was backloaded off the wire.  A ENJORE Navigator 13/15 [place] by 36 cm ureteral access sheath was passed over the working wire up to the proximal ureter under fluoroscopic guidance and the obturator  and wire were removed.  An Olympus digital flexible ureteroscope was then passed through the sheath up to the renal pelvis.  Complete pyeloscopy revealed a single fragment which had escaped up into the pelvis which was basketed out.  There were no other stones seen within the collecting system.  Repeat retrograde pyelogram was performed to provide a landing zone for the stent.  Pullout ureteroscopy revealed the ureter to be in good condition with no tears or lacerations.  The ureteroscope and access sheath were removed.  A stent was then placed in the usual fashion under cystoscopic and fluoroscopic guidance with good coils noted proximally and distally.  Bladder was drained and scope was removed.  A belladonna and opium suppository was placed per rectum.  Patient was cleaned up and awoken from anesthesia and brought to PACU in stable condition.    Alonzo Rome MD   [facility]  [phone] clinic phone

## 2022-09-20 NOTE — ANESTHESIA POSTPROCEDURE EVALUATION
Patient: Lynn Thompson    Procedure: Procedure(s):  CYSTOSCOPY, WITH RETROGRADE PYELOGRAM, LEFT URETEROSCOPY, HOLMIUM LASER LITHOTRIPSY OF URETERAL CALCULUS, STONE BASKETING, AND URETERAL STENT INSERTION- LEFT       Anesthesia Type:  General    Note:     Postop Pain Control: Uneventful            Sign Out: Well controlled pain   PONV: No   Neuro/Psych: Uneventful            Sign Out: Acceptable/Baseline neuro status   Airway/Respiratory: Uneventful            Sign Out: Acceptable/Baseline resp. status   CV/Hemodynamics: Uneventful            Sign Out: Acceptable CV status   Other NRE: NONE   DID A NON-ROUTINE EVENT OCCUR? No           Last vitals:  Vitals Value Taken Time   /67 09/20/22 1705   Temp 98.5  F (36.9  C) 09/20/22 1700   Pulse 56 09/20/22 1708   Resp 10 09/20/22 1708   SpO2 96 % 09/20/22 1708   Vitals shown include unvalidated device data.    Electronically Signed By: Socrates Hayward MD  September 20, 2022  5:09 PM

## 2022-09-20 NOTE — CONSULTS
Consult Date: 2022    SURGERY CONSULTATION    REASON FOR CONSULTATION:  Possible partial small bowel obstruction.    HISTORY OF PRESENT ILLNESS:  Ms. Thompson is a 57-year-old female with somewhat complex surgical history, which includes ileocolic resection as well as a gastric bypass in addition to a more recent rectopexy, prior abdominoplasty and islet cell transplant, who presented with signs and symptoms consistent with a possible partial bowel obstruction.  She is actually known to our service from a previous bowel obstruction, which was treated here at the [facility] last year.  She has had 2 interventions for bowel obstruction in the past, most recent of which was at least 5 years ago.  On this occasion, the patient presented with nausea and vomiting, bloating and crampy abdominal pain primarily in the lower abdomen radiating to the left back.  A CT scan obtained by our ER showed a 9 mm stone in the left proximal ureter in addition to some mildly dilated small bowel including the biliary limb as well as the distal ileum with decompression past this point, but no abrupt transition.  Since being admitted to the hospital, the patient states that she started to have some flatus and denies any additional nausea or vomiting.  Her last episode of emesis was yesterday.  She was able to tolerate an oral challenge for a Gastrografin study about 2-1/2 hours ago.  She is scheduled to have a cystoscopy and stent this afternoon.    PAST MEDICAL AND SURGICAL HISTORY:  Includes abdominoplasty, appendectomy, bunionectomy, common duct stent placement, , cholecystectomy, colonoscopy, previous cystoscopy with stents, ovarian cystectomy, EGD as well as gastric bypass, hernia, hysterectomy, left hand irrigation and debridement, laparoscopic adhesiolysis, pancreatectomy and splenectomy with islet cell autotransplantation, ileocolic resection, sacral colpopexy, tonsillectomy.  She has a history of iron deficiency anemia,  chronic pain, obstructive sleep apnea, anxiety, hypothyroidism, small bowel obstructions as outlined above, and pelvic floor dysfunction.    CURRENT MEDICATIONS:  At the time of admission, the patient's home medications included:  1.  Cymbalta.  2.  Lexapro.  3.  Estrace.  4.  Pepcid.  5.  Neurontin.  6.  Glucagon  7.  Glucose gel.  8.  Atarax.  9.  Lantus.  10.  Synthroid.  11.  Viokase.  12.  Claritin.  13.  Provigil.  14.  Prilosec.  15.  Desyrel.    ALLERGIES:  THE PATIENT HAS DRUG ALLERGY TO CORTICOSTEROIDS DUE TO HER TRANSPLANT STATUS, IODINE WHICH CAUSES HIVES, NSAIDS WHICH CAUSE GI UPSET, SULFASALAZINE WHICH CAUSES NAUSEA AND VOMITING.    SOCIAL HISTORY:  The patient has never been a smoker.  She denies any significant alcohol use.  She lives with her  and daughter at home.    PHYSICAL EXAMINATION:    VITAL SIGNS:  Ms. Thompson is afebrile, temperature is 98.1, pulse 60 with a blood pressure of 138/72, respiratory rate 16 with 98% saturations on room air.  GENERAL:  She is generally alert and appropriate, nontoxic.  CARDIOVASCULAR:  Heart sounds are regular.  LUNGS:  Breath sounds are clear.  ABDOMEN:  Soft.  She has a well-healed laparotomy scar as well as a right paramedian scar in addition to a left upper quadrant feeding tube scar site.  She has audible bowel sounds in all 4 quadrants.  No focal tenderness or peritonitis.    LABORATORY EXAMINATION:  Notable for white blood cell count of 6.7 currently, hemoglobin 11.0.  Electrolytes are unremarkable.  Glucose was 170.    IMAGING:  I personally reviewed the patient's CT scan done yesterday late afternoon.  This shows mild dilation of the biliary limb and changes consistent with her known gastric bypass.  She has an anastomosis in the right lower quadrant as well.  There are centrally dilated small bowel loops without discrete transition point.  There is no ascites or edema in the bowel to suggest ischemia.  As noted, there is a left ureteral calculus  measuring 9 mm.    IMPRESSION AND RECOMMENDATIONS:  This is a 57-year-old female with extensive abdominal surgical history and previous bowel obstructions in the past, who presents with nausea, vomiting and pain radiating to the left back.  She has findings of possible partial bowel obstruction on CT but, more notably a left ureteral calculus is present as well.  She is actually resolving her obstructive-type symptoms.  I suspect that these may have in actuality been due to a reactive ileus to the pain and possible infection related to her kidney stone.  She has initiated a Gastrografin challenge and we will see a film likely about 6 o'clock this evening.  I do not anticipate any surgical intervention in regards to her bowel obstruction and, hopefully, after some relief of her ureteral obstruction, she will overall feel improved thereafter.  We will, of course, follow along with you during the course of her hospitalization and anticipated convalescence.    Thank you very much for this consultation.    Demian Viveros MD        D: 2022   T: 2022   MT: [id number]    Name:     ANGEL CARPENTER  MRN:      [id number]        Account:      [id number]   :      1965           Consult Date: 2022     Document: [id number]    cc:  Maryana Brooks MD

## 2022-09-20 NOTE — ED PROVIDER NOTES
History   Chief Complaint:  Abdominal Pain      HPI   Lynn Thompson is a 57 year old female with history of kidney stones, gastric bypass surgery history, and small bowel obstructions who presents for evaluation of abdominal pain. This morning around 0400 the patient started to develop new abdominal pain, feelings of abdominal distension and hardness,, nausea, and vomiting with a reduced appetite. Later in the morning her pain started to worsen significantly. She characterizes her pain as a sharp sensation that radiates to her mid back and improves somewhat with a heating pad. Due to concern for this new pain she came into the ED. She has a history of a gastric bypass and previous bowel obstruction and expresses primary concern that her pain could be representative of this. She did have a small bowel movement earlier today and her last normal bowel movement was 1-2 days ago. She has not had any fever, cough, shortness of breath, chest pain, diarrhea, constipation, bloody stools, dysuria, hematuria, or urinary frequency.     Review of Systems   Constitutional: Positive for appetite change. Negative for fever.   Respiratory: Negative for cough and shortness of breath.    Cardiovascular: Negative for chest pain.   Gastrointestinal: Positive for abdominal distention, abdominal pain, nausea and vomiting. Negative for blood in stool, constipation and diarrhea.   Genitourinary: Negative for dysuria, frequency and hematuria.   All other systems reviewed and are negative.      Allergies:  Corticosteroids  Povidone Iodine  Nsaids  Sulfasalazine     Medications:  Cymbalta  Lexapro  Estradiol   Pepcid   Gabapentin   FIASP penfill   Glucagon  Hydroxyzine  Lantus   Levothyroxine   Viokace   Claritin  Modafinil  Omeprazole  Trazodone     Past Medical History:     Iron deficiency anemia  Chronic pain  Asplenia  Exocrine pancreatic insufficiency   Obstructive sleep apnea   Anxiety  Hypothyroidism   Small bowel  obstruction  Uterovaginal prolapse   Pelvic floor dysfunction      Past Surgical History:    Abdominoplasty  Appendectomy  Bunionectomy   CBD stent placement    section  Cholecystectomy   Colonoscopy   Combined cystoscopy, retrogrades, ureteroscopy, laser holmium lithotripsy ureter(s), insert stent, right   Cystectomy ovarian   Cystoscopy, retrogrades, insert stent ureter(s), combined   EGD combined   ESWL   Gastric bypass  Hernia repair  Hysterectomy supracervical, bilateral salpingo-oophorectomy, combined  Irrigation and debridement hand, left   Laparoscopic lysis adhesions   Pancreatectomy, transplant auto islet cell, combined   Partial ileum resection   Repair ptosis brow bilateral   Sacrocolpopexy, cystoscopy, combined  Sigmoidoscopy flexible   Tonsillectomy, adenoidectomy   Pancreatic auto-islet transplant     Family History:    COPD - Father   Kidney stones - Father and brother   Depression - Father   Asthma - Father and daughter   Anxiety - Son and daughter     Social History:  Marital status:   The patient presents to the ED accompanied by her spouse.     Physical Exam     Patient Vitals for the past 24 hrs:   BP Temp Pulse Resp SpO2   22 1539 131/87 97.4  F (36.3  C) 98 16 98 %       Physical Exam  Nursing note and vitals reviewed.  HENT:   Mouth/Throat: Moist mucous membranes.   Eyes: EOMI, nonicteric sclera  Cardiovascular: Normal rate, regular rhythm, no murmurs, rubs, or gallops  Pulmonary/Chest: Effort normal and breath sounds normal. No respiratory distress. No wheezes. No rales.   Abdominal: Soft. Mild generalized TTP, nondistended, no guarding or rigidity.   Musculoskeletal: Normal range of motion.   Neurological: Alert. Moves all extremities spontaneously.   Skin: Skin is warm and dry. No rash noted.   Psychiatric: Normal mood and affect.       Emergency Department Course     Imaging:  CT Abdomen Pelvis w Contrast   Final Result   IMPRESSION:       1.  Partially obstructing 9  x 4 mm calculus within the proximal left ureter, resulting in mild pelviectasis but no significant caliectasis.      2.  Status post gastric bypass, with a probable early or partial mechanical small bowel obstruction. There are multiple mildly dilated loops of small bowel in the midabdomen, some of which contain fecalized contents. Hepatobiliary loops in the left upper    quadrant are also mildly dilated. No discrete transition point is identified. No signs of bowel compromise.      3.  Large volume stool throughout the colon.      4.  Nonobstructing right renal calculi measuring up to 4 mm.      Report per radiology    Laboratory:  Labs Ordered and Resulted from Time of ED Arrival to Time of ED Departure   CBC WITH PLATELETS - Abnormal       Result Value    WBC Count 12.4 (*)     RBC Count 4.39      Hemoglobin 13.0      Hematocrit 42.3      MCV 96      MCH 29.6      MCHC 30.7 (*)     RDW 13.3      Platelet Count 422     BASIC METABOLIC PANEL - Abnormal    Sodium 141      Potassium 4.5      Chloride 102      Carbon Dioxide (CO2) 28      Anion Gap 11      Urea Nitrogen 24.3 (*)     Creatinine 0.80      Calcium 9.8      Glucose 131 (*)     GFR Estimate 84     HEPATIC FUNCTION PANEL - Abnormal    Protein Total 7.1      Albumin 4.2      Bilirubin Total <0.2      Alkaline Phosphatase 151 (*)     AST 44 (*)     ALT 56 (*)     Bilirubin Direct <0.20     ROUTINE UA WITH MICROSCOPIC REFLEX TO CULTURE - Abnormal    Color Urine Light Yellow      Appearance Urine Clear      Glucose Urine Negative      Bilirubin Urine Negative      Ketones Urine Negative      Specific Gravity Urine 1.022      Blood Urine Negative      pH Urine 5.5      Protein Albumin Urine 10  (*)     Urobilinogen Urine Normal      Nitrite Urine Negative      Leukocyte Esterase Urine Moderate (*)     Bacteria Urine Few (*)     Mucus Urine Present (*)     RBC Urine 5 (*)     WBC Urine 39 (*)    LACTIC ACID WHOLE BLOOD   COVID-19 VIRUS (CORONAVIRUS) BY PCR    URINE CULTURE   BLOOD CULTURE   BLOOD CULTURE      Emergency Department Course:     Reviewed:  I reviewed nursing notes, vitals and past medical history    Assessments:  1935: I obtained history and examined the patient as noted above.     Consults:  1915: I spoke with Dr. Victor of the urology service regarding patient's presentation, findings, and plan of care.    2015: I spoke with Dr. Bravo of the hospitalist service regarding patient's presentation, findings, and plan of care.     Interventions:  1649 Toradol 15 mg IV   2009 NS 1,000 mL IV   2009 Dilaudid 0.5 mg IV   2021 Rocephin 1 g IV     Disposition:  The patient was admitted to the hospital under the care of Dr. Bravo.     Impression & Plan        Medical Decision Making:  Patient presents with abdominal pain with associated vomiting.  Patient with history of bowel obstructions and kidney stones.  Interestingly, CT scan suggests both are a problem today.  Overall, suspect that the 9 mm stone is most responsible for her symptoms.  There is some pyuria and leukocytosis therefore we will cover with antibiotics, but infection is considered less likely.  Discussed with urology who stated they would likely perform procedure in the morning.  Regarding SBO, CT has similar appearance to prior CTs.  No indication for NG or surgical intervention at this time.  Pain controlled with interventions.  Admission indicated for further evaluation and treatment.  Dr. Bravo accepts for admission.  All questions answered.     Diagnosis:    ICD-10-CM    1. Kidney stone on left side  N20.0    2. Pyuria  R82.81    3. Small bowel obstruction (H)  K56.609           Scribe Disclosure:  I, Bill Medeiros, am serving as a scribe at 7:15 PM on 9/19/2022 to document services personally performed by Dr. Bear, based on my observations and the provider's statements to me.           Stephen Bear MD  09/20/22 7341

## 2022-09-20 NOTE — DISCHARGE INSTRUCTIONS
POSTOPERATIVE INSTRUCTIONS    Procedure-------------------------------  Procedure(s) (LRB):  CYSTOSCOPY, WITH RETROGRADE PYELOGRAM, LEFT URETEROSCOPY, HOLMIUM LASER LITHOTRIPSY OF URETERAL CALCULUS, STONE BASKETING, AND URETERAL STENT INSERTION- LEFT (Left)    Home-going instructions-----------------         Activity Limitation:     - No driving or operating heavy machinery while on narcotic pain medication.     FOLLOW THESE INSTRUCTIONS AS INDICATED BELOW:  - Observe operative area for signs of excessive bleeding.  - You may shower.  - Increase fluid intake to promote clear urine.  - Resume usual diet as tolerated    What to expect while recovering-----------  - You may experience some intermittent bleeding that makes your urine pink or cherry colored. This is normal.  - However, if you are unable to urinate, passing large amount of clots, have ranjith blood in your urine, or have a temperature >101 degrees, call the urology nurse on call, or present to your nearest emergency department.  - You are encouraged to walk daily, and have no activity restrictions.   - A URETERAL STENT has been placed that allows urine to flow unobstructed from your kidney into your bladder.  The stent has a curl in the kidney and a curl in the bladder.  The curl in the bladder can cause some urgency and frequency of urination as well as some mild blood in the urine.  The curl in the kidney can cause some mild flank discomfort.  This may be more noticeable when you urinate.  A URETERAL STENT is meant to be left in temporarily.  It must be removed or changed no later than 3 months after its insertion.  If it's not removed it can result in stone overgrowth on the stent that can cause pain, infection, and can be very difficult to remove.      Discharge Medications/instructions:   - Flomax (tamsulosin) to be taken daily until stent is removed  - Antibiotics (***) are to be taken with you to your scheduled return visit for stent removal  - Take  Tylenol 1000mg every 6 hours for pain  - Take Ibuprofen 600mg every 6 hours as needed for additional pain control (alternate with the Tylenol so you take one or the other every 3 hours)  - Take Colace while taking Oxycodone to prevent constipation    Questions/concerns------------------------  [facility]: [phone]  Alonzo Rome MD

## 2022-09-20 NOTE — PHARMACY-ADMISSION MEDICATION HISTORY
Admission medication history interview status for this patient is complete. See [facility] admission navigator for allergy information, prior to admission medications and immunization status.     Medication history interview done, indicate source(s): Patient  Medication history resources (including written lists, pill bottles, clinic record):None  Pharmacy: -    Changes made to PTA medication list:  Added: -  Changed: -  Reported as Not Taking: -  Removed: -    Actions taken by pharmacist (provider contacted, etc):None     Additional medication history information:None    Medication reconciliation/reorder completed by provider prior to medication history?  no   (Y/N)     For patients on insulin therapy:   Do you use sliding scale insulin based on blood sugars? yes  What is your pre-meal insulin coverage?    Do you typically eat three meals a day?   How many times do you check your blood glucose per day?   How many episodes of hypoglycemia do you typically have per month?   Do you have a Continuous Glucose Monitor (CGM)?      Prior to Admission medications    Medication Sig Last Dose Taking? Auth Provider Long Term End Date   calcium carbonate 600 mg-vitamin D 400 units (CALTRATE) 600-400 MG-UNIT per tablet Take 1 tablet by mouth daily 9/19/2022 at Unknown time Yes Unknown, Entered By History     DULoxetine (CYMBALTA) 20 MG capsule Take 20 mg by mouth daily 9/19/2022 at Unknown time Yes Unknown, Entered By History No    escitalopram (LEXAPRO) 20 MG tablet Take 20 mg by mouth daily 9/19/2022 at Unknown time Yes Unknown, Entered By History No    estradiol (ESTRACE) 0.1 MG/GM vaginal cream PLACE 2 GRAMS VAGINALLY 2 TIMES WEEKLY  Patient taking differently: PLACE 2 GRAMS VAGINALLY 2 TIMES WEEKLY  Thursdays and Sundays Past Month at Unknown time Yes Kavitha Spencer,      famotidine (PEPCID) 40 MG tablet Take 1 tablet (40 mg) by mouth 2 times daily as needed for heartburn  Yes Maryana Brooks MD     FIASP PENFILL  100 UNIT/ML SOCT Inject 0.5-4 Units Subcutaneous 4 times daily (with meals and nightly)  Patient taking differently: Inject 0.5-4 Units Subcutaneous 3 times daily (with meals)  Yes Adriana Robles MD     gabapentin (NEURONTIN) 300 MG capsule Take 300 mg by mouth nightly as needed 9/18/2022 at Unknown time Yes Unknown, Entered By History No    hydrOXYzine (ATARAX) 25 MG tablet TAKE 1-2 TABLETS BY MOUTH 2 TIMES DAILY AS NEEDED FOR ANXIETY  Yes Reported, Patient     insulin glargine (LANTUS PEN) 100 UNIT/ML pen Inject 5 Units Subcutaneous At Bedtime Inject 6 units daily  Patient taking differently: Inject 5 Units Subcutaneous At Bedtime Inject 5 units daily 9/18/2022 at Unknown time Yes Adriana Robles MD Yes    levothyroxine (SYNTHROID/LEVOTHROID) 100 MCG tablet Take 1 tablet (100 mcg) by mouth daily 9/19/2022 at Unknown time Yes Maryana Brooks MD Yes    lipase-protease-amylase (VIOKACE) 45728-12053 units TABS tablet 5 with meals and 3 with snacks.  Up to 20 per day supply. 9/19/2022 at x1 Yes Adriana Robles MD No    loratadine (CLARITIN) 10 MG tablet Take 10 mg by mouth daily as needed   Yes Unknown, Entered By History     modafinil (PROVIGIL) 200 MG tablet Take 400 mg by mouth daily 9/19/2022 at Unknown time Yes Unknown, Entered By History     omeprazole (PRILOSEC) 40 MG DR capsule Take 1 capsule (40 mg) by mouth 2 times daily Take 30-60 minutes before a meal. 9/19/2022 at x1 Yes Maryana Brooks MD No    traZODone (DESYREL) 50 MG tablet Take 50 mg by mouth nightly as needed for sleep  Yes Unknown, Entered By History No    Glucagon (GVOKE HYPOPEN 1-PACK) 1 MG/0.2ML SOAJ Inject 1 mg Subcutaneous once as needed (for hypoglycemia with loss of consciousness)   Adriana Robles MD Yes    glucose 40 % GEL Take 15-30 g by mouth every 15 minutes as needed.   Suzi Short, APRN CNP Yes    insulin pen needle (32G X 4 MM) 32G X 4 MM miscellaneous Use 4-6 pen needles daily to inject subcutaneous  insulin   Adriana Robles MD No

## 2022-09-20 NOTE — ANESTHESIA CARE TRANSFER NOTE
Patient: Lynn Thompson    Procedure: Procedure(s):  CYSTOSCOPY, WITH RETROGRADE PYELOGRAM, LEFT URETEROSCOPY, HOLMIUM LASER LITHOTRIPSY OF URETERAL CALCULUS, STONE BASKETING, AND URETERAL STENT INSERTION- LEFT       Diagnosis: Pyuria [R82.81]  Ureteral stone [N20.1]  Diagnosis Additional Information: No value filed.    Anesthesia Type:   General     Note:    Oropharynx: oropharynx clear of all foreign objects and spontaneously breathing  Level of Consciousness: drowsy  Oxygen Supplementation: face mask    Independent Airway: airway patency satisfactory and stable  Dentition: dentition unchanged  Vital Signs Stable: post-procedure vital signs reviewed and stable  Report to RN Given: handoff report given  Patient transferred to: PACU    Handoff Report: Identifed the Patient, Identified the Reponsible Provider, Reviewed the pertinent medical history, Discussed the surgical course, Reviewed Intra-OP anesthesia mangement and issues during anesthesia, Set expectations for post-procedure period and Allowed opportunity for questions and acknowledgement of understanding      Vitals:  Vitals Value Taken Time   BP     Temp     Pulse 75 09/20/22 1617   Resp 13 09/20/22 1617   SpO2 100 % 09/20/22 1617   Vitals shown include unvalidated device data.    Electronically Signed By: MITZY Hoover CRNA  September 20, 2022  4:19 PM

## 2022-09-20 NOTE — PROGRESS NOTES
[facility]  Medicine Progress Note - Hospitalist Service  Date of Admission:  9/19/2022    Assessment & Plan        Ms. Lynn Thompson is a 57 year old female with a history of gastric bypass surgery, chronic idiopathic pancreatitis with pancreatectomy and islet cell transplant, 3 or 4 small bowel obstructions and 2 lysis of adhesion surgeries, multiple episodes of kidney stones with previous lithotripsy, hypertension, depression, diabetes mellitus, struct of sleep apnea, hysterectomy and bilateral salpingooophorectomy, previous, bile duct stent admitted on 9/19/2022 with acute onset flank pain, found to have ureteral stone. Pending urologic procedure scheduled later today and clinical improvement.     Ureteral stone, left proximal   Hx of ureteral stones and stent placement   She has a very complex past medical history and presents with abdominal pain.  She reports feeling mildly unwell over the weekend.  At 4 AM this morning she developed diffuse abdominal pain and also some vomiting.  She describes it as sharp and radiating to her back.  She also has some abdominal distention.  She did have a bowel movement today.  She has had no difficulty urinating.  She has had several abdominal surgeries in the past and has had 3 or 4 small bowel obstructions as well as lysis of adhesions.  She is also had several kidney stones in the past and required lithotripsies.   Urinalysis showed 38 white cells.  CT of the abdomen and pelvis showed multiple previous surgical changes, a 9 x 4 mm left proximal ureteral stone and mildly dilated loops of small bowel as well as large amount of stool in the colon.   -- This seems most likely source of her pain as her pain had fairly sudden onset.  -- Remain n.p.o. and hydrate with IV fluids  -- Urology following    -plan to OR today for cystoscopy, left retrograde pyelogram, and left ureteral stent placement.     -Will need to return to the OR in several weeks after  treatment of infection for definitive stone procedure to likely include cystoscopy, left ureteroscopy laser lithotripsy and basketing, and left ureteral stent exchange    -IVF fluids   -IV antibiotics will transition to culture specific when available and oral when clinically appropriate   -Flomax 0.4mg QHS - monitor for orthostatic hypotension   -Strain urine   -Would recommend metabolic evaluation after this acute episode given strong family history of nephrolithiasis as well as recurrent nephrolithiasis  -- Pain medications PRN   -- Remains on ceftriaxone      Possible partial small bowel obstruction/ileus   Probable reactive ileus   CT showed mildly dilated loops of small bowel.  Unclear if this is an acute or more chronic finding. Her presenting symptoms could be secondary to ureteral stone and or partial small bowel obstruction.   -- General surgery consultation     Gastrografin challenge initiated, repeat film later today      Diabetes mellitus  She has been on low-dose Lantus insulin recently despite islet cell transplant.  -- We will treat with sliding scale insulin for now while n.p.o.   - monitor BG      Sleep apnea  -- Resume PTA CPAP        Diet: NPO for Medical/Clinical Reasons Except for: No Exceptions    DVT Prophylaxis: Pneumatic Compression Devices  Gonzalez Catheter: Not present  Central Lines: None  Cardiac Monitoring: None  Code Status: Full Code      Disposition Plan      Expected Discharge Date: 09/21/2022                The patient's care was discussed with the Bedside Nurse and Patient.    Eulalia Finch DO  Hospitalist Service  [facility]  Securely message with the [employer] Web Console (learn more here)  Text page via [employer] Paging/Directory         Clinically Significant Risk Factors Present on Admission                          ______________________________________________________________________    Interval History   Patient reports feeling overall improved , but  does have ongoing left sided flank pain   Worse with certain movements and with palpation   She has no fever or chills, nausea or vomiting or other symptoms   She has no other concerns or complaints today     Data reviewed today: I reviewed all medications, new labs and imaging results over the last 24 hours. I personally reviewed no images or EKG's today. Gastrografin challenge pending     Physical Exam   Vital Signs: Temp: 98.1  F (36.7  C)   BP: 138/72 Pulse: 60   Resp: 16 SpO2: 98 % O2 Device: None (Room air)    Weight: 116 lbs 9.6 oz     Constitutional: awake, alert, cooperative, no apparent distress, and appears stated age   HEENT: Normocephalic, atraumatic  Respiratory: Normal work of breathing, good air exchange, clear to auscultation bilaterally, no crackles or wheezing   Cardiovascular: Regular rate and rhythm, no murmurs appreciated  GI: Soft and nontender to palpation, nondistended, no rebound or guarding  Skin: normal skin color, texture, turgor   Musculoskeletal: Left sided flank pain, reproducible with palpation to left flank. No overlying skin changes   No deformities, no edema present  Neurologic: Alert and oriented, no focal deficits   Neuropsychiatric: General: normal, calm and normal eye contact     Data   Recent Labs   Lab 09/20/22  1010 09/20/22  0914 09/20/22  0451 09/20/22  0057 09/19/22  1538 09/15/22  1115 09/15/22  0948   WBC 6.8  --   --   --  12.4*  --  8.3   HGB 11.0*  --   --   --  13.0  --  13.0   MCV 97  --   --   --  96  --  96     --   --   --  422  --  432     --   --   --  141  --  142   POTASSIUM 4.9  --   --   --  4.5  --  4.4   CHLORIDE 107  --   --   --  102  --  101   CO2 25  --   --   --  28  --  30*   BUN 18.7  --   --   --  24.3*  --  21.2   CR 0.67  --   --   --  0.80  --  0.83   ANIONGAP 8  --   --   --  11  --  11   VALARIE 8.8  --   --   --  9.8  --  9.9   * 156* 109*   < > 131*   < > 170*  170*   ALBUMIN  --   --   --   --  4.2  --  4.3   PROTTOTAL   --   --   --   --  7.1  --  7.1   BILITOTAL  --   --   --   --  <0.2  --  0.2   ALKPHOS  --   --   --   --  151*  --  151*   ALT  --   --   --   --  56*  --  61*   AST  --   --   --   --  44*  --  46*    < > = values in this interval not displayed.     Recent Results (from the past 24 hour(s))   CT Abdomen Pelvis w Contrast    Narrative    EXAM: CT ABDOMEN PELVIS W CONTRAST  LOCATION: [facility]  DATE/TIME: 9/19/2022 5:27 PM    INDICATION: Abdominal pain.  COMPARISON: CT abdomen pelvis 11/23/2021.  TECHNIQUE: CT scan of the abdomen and pelvis was performed following injection of IV contrast. Multiplanar reformats were obtained. Dose reduction techniques were used.  CONTRAST: 56mL Isovue 370    FINDINGS:   LOWER CHEST: Normal.    HEPATOBILIARY: No focal liver lesions. Prior cholecystectomy with a hepaticojejunostomy. Pneumobilia is unchanged. No biliary ductal dilation.    PANCREAS: Prior pancreatectomy.    SPLEEN: Prior splenectomy.    ADRENAL GLANDS: Normal.    KIDNEYS/BLADDER: Nonobstructing right renal calculi measuring up to 4 mm at the lower pole. No right hydronephrosis. 9 x 4 mm calculus within the proximal left ureter (4/#42), resulting in mild pelviectasis, but no significant caliectasis. Mild cortical   scarring at the upper pole of the left kidney is unchanged. Symmetric renal enhancement. Urinary bladder is decompressed.    BOWEL: Status post gastric bypass and ileocecectomy. Mildly dilated hepatobiliary loops of small bowel are present in the left upper quadrant. Alimentary small bowel loops and proximal common channel are decompressed, however there are multiple mildly   dilated loops of small bowel within the central abdomen, some of which contain fecalized enteric contents. No discrete transition point is identified. Distal ileal loops are normal in caliber, and the ileocecal anastomosis is patent. Large volume stool   is present throughout the colon.    LYMPH NODES: No  enlarged lymph nodes.    VASCULATURE: Patent portal and superior mesenteric veins. No mesenteric swirling. Nonaneurysmal abdominal aorta.    PELVIC ORGANS: Uterus is absent.    MUSCULOSKELETAL: Nonobstructive bowel gas pattern. No aggressive bone lesions.      Impression    IMPRESSION:     1.  Partially obstructing 9 x 4 mm calculus within the proximal left ureter, resulting in mild pelviectasis but no significant caliectasis.    2.  Status post gastric bypass, with a probable early or partial mechanical small bowel obstruction. There are multiple mildly dilated loops of small bowel in the midabdomen, some of which contain fecalized contents. Hepatobiliary loops in the left upper   quadrant are also mildly dilated. No discrete transition point is identified. No signs of bowel compromise.    3.  Large volume stool throughout the colon.    4.  Nonobstructing right renal calculi measuring up to 4 mm.     Medications     dextrose 5% and 0.9% NaCl with potassium chloride 20 mEq 100 mL/hr (09/20/22 1302)       cefTRIAXone  1 g Intravenous Q24H     insulin aspart  1-6 Units Subcutaneous Q4H     pantoprazole  40 mg Intravenous Daily with breakfast     sodium chloride (PF)  3 mL Intracatheter Q8H

## 2022-09-20 NOTE — PLAN OF CARE
A&O x4.  VSS on RA.  Independent in room.  Voiding.  Last BM yesterday.  Passing gas.  Zofran for nausea.  Gastrografin completed at 10:15 am and xray notified.  Left PIV with D5 NS with K at 100/hr.  IV dilaudid for pain control.  NPO.  Picked up for surgery at 1315.

## 2022-09-21 ENCOUNTER — APPOINTMENT (OUTPATIENT)
Dept: GENERAL RADIOLOGY | Facility: CLINIC | Age: 57
End: 2022-09-21
Attending: PHYSICIAN ASSISTANT
Payer: COMMERCIAL

## 2022-09-21 LAB
ANION GAP SERPL CALCULATED.3IONS-SCNC: 5 MMOL/L (ref 7–15)
BACTERIA UR CULT: ABNORMAL
BUN SERPL-MCNC: 12.9 MG/DL (ref 8–23)
CALCIUM SERPL-MCNC: 8.6 MG/DL (ref 8.6–10)
CHLORIDE SERPL-SCNC: 107 MMOL/L (ref 98–107)
CREAT SERPL-MCNC: 0.72 MG/DL (ref 0.51–0.95)
DEPRECATED HCO3 PLAS-SCNC: 28 MMOL/L (ref 22–29)
ERYTHROCYTE [DISTWIDTH] IN BLOOD BY AUTOMATED COUNT: 13.6 % (ref 10–15)
GFR SERPL CREATININE-BSD FRML MDRD: >90 ML/MIN/1.73M2
GLUCOSE BLDC GLUCOMTR-MCNC: 150 MG/DL (ref 70–99)
GLUCOSE BLDC GLUCOMTR-MCNC: 155 MG/DL (ref 70–99)
GLUCOSE BLDC GLUCOMTR-MCNC: 168 MG/DL (ref 70–99)
GLUCOSE BLDC GLUCOMTR-MCNC: 187 MG/DL (ref 70–99)
GLUCOSE BLDC GLUCOMTR-MCNC: 188 MG/DL (ref 70–99)
GLUCOSE SERPL-MCNC: 182 MG/DL (ref 70–99)
HCT VFR BLD AUTO: 33.4 % (ref 35–47)
HGB BLD-MCNC: 10.2 G/DL (ref 11.7–15.7)
MCH RBC QN AUTO: 29.7 PG (ref 26.5–33)
MCHC RBC AUTO-ENTMCNC: 30.5 G/DL (ref 31.5–36.5)
MCV RBC AUTO: 97 FL (ref 78–100)
PLATELET # BLD AUTO: 336 10E3/UL (ref 150–450)
POTASSIUM SERPL-SCNC: 4.8 MMOL/L (ref 3.4–5.3)
RBC # BLD AUTO: 3.43 10E6/UL (ref 3.8–5.2)
SODIUM SERPL-SCNC: 140 MMOL/L (ref 136–145)
WBC # BLD AUTO: 7.4 10E3/UL (ref 4–11)

## 2022-09-21 PROCEDURE — 258N000001 HC RX 258: Performed by: STUDENT IN AN ORGANIZED HEALTH CARE EDUCATION/TRAINING PROGRAM

## 2022-09-21 PROCEDURE — 74019 RADEX ABDOMEN 2 VIEWS: CPT

## 2022-09-21 PROCEDURE — 99231 SBSQ HOSP IP/OBS SF/LOW 25: CPT | Performed by: PHYSICIAN ASSISTANT

## 2022-09-21 PROCEDURE — 250N000013 HC RX MED GY IP 250 OP 250 PS 637: Performed by: PHYSICIAN ASSISTANT

## 2022-09-21 PROCEDURE — 99233 SBSQ HOSP IP/OBS HIGH 50: CPT | Performed by: STUDENT IN AN ORGANIZED HEALTH CARE EDUCATION/TRAINING PROGRAM

## 2022-09-21 PROCEDURE — 250N000011 HC RX IP 250 OP 636: Performed by: STUDENT IN AN ORGANIZED HEALTH CARE EDUCATION/TRAINING PROGRAM

## 2022-09-21 PROCEDURE — 250N000011 HC RX IP 250 OP 636: Performed by: INTERNAL MEDICINE

## 2022-09-21 PROCEDURE — 120N000001 HC R&B MED SURG/OB

## 2022-09-21 PROCEDURE — C9113 INJ PANTOPRAZOLE SODIUM, VIA: HCPCS | Performed by: STUDENT IN AN ORGANIZED HEALTH CARE EDUCATION/TRAINING PROGRAM

## 2022-09-21 PROCEDURE — 80048 BASIC METABOLIC PNL TOTAL CA: CPT | Performed by: STUDENT IN AN ORGANIZED HEALTH CARE EDUCATION/TRAINING PROGRAM

## 2022-09-21 PROCEDURE — 99231 SBSQ HOSP IP/OBS SF/LOW 25: CPT | Mod: FS | Performed by: SURGERY

## 2022-09-21 PROCEDURE — 36415 COLL VENOUS BLD VENIPUNCTURE: CPT | Performed by: STUDENT IN AN ORGANIZED HEALTH CARE EDUCATION/TRAINING PROGRAM

## 2022-09-21 PROCEDURE — 85027 COMPLETE CBC AUTOMATED: CPT | Performed by: STUDENT IN AN ORGANIZED HEALTH CARE EDUCATION/TRAINING PROGRAM

## 2022-09-21 RX ORDER — BISACODYL 10 MG
10 SUPPOSITORY, RECTAL RECTAL DAILY PRN
Status: DISCONTINUED | OUTPATIENT
Start: 2022-09-21 | End: 2022-09-23 | Stop reason: HOSPADM

## 2022-09-21 RX ORDER — AMOXICILLIN 250 MG
1 CAPSULE ORAL AT BEDTIME
Status: DISCONTINUED | OUTPATIENT
Start: 2022-09-21 | End: 2022-09-23 | Stop reason: HOSPADM

## 2022-09-21 RX ORDER — TAMSULOSIN HYDROCHLORIDE 0.4 MG/1
0.4 CAPSULE ORAL DAILY
Status: DISCONTINUED | OUTPATIENT
Start: 2022-09-21 | End: 2022-09-23 | Stop reason: HOSPADM

## 2022-09-21 RX ADMIN — HYDROMORPHONE HYDROCHLORIDE 0.2 MG: 0.2 INJECTION, SOLUTION INTRAMUSCULAR; INTRAVENOUS; SUBCUTANEOUS at 18:02

## 2022-09-21 RX ADMIN — KETOROLAC TROMETHAMINE 30 MG: 30 INJECTION, SOLUTION INTRAMUSCULAR at 06:46

## 2022-09-21 RX ADMIN — HYDROMORPHONE HYDROCHLORIDE 0.2 MG: 0.2 INJECTION, SOLUTION INTRAMUSCULAR; INTRAVENOUS; SUBCUTANEOUS at 12:59

## 2022-09-21 RX ADMIN — HYDROMORPHONE HYDROCHLORIDE 0.2 MG: 0.2 INJECTION, SOLUTION INTRAMUSCULAR; INTRAVENOUS; SUBCUTANEOUS at 15:09

## 2022-09-21 RX ADMIN — POTASSIUM CHLORIDE, DEXTROSE MONOHYDRATE AND SODIUM CHLORIDE 100 ML/HR: 150; 5; 900 INJECTION, SOLUTION INTRAVENOUS at 03:53

## 2022-09-21 RX ADMIN — INSULIN ASPART 1 UNITS: 100 INJECTION, SOLUTION INTRAVENOUS; SUBCUTANEOUS at 20:39

## 2022-09-21 RX ADMIN — SENNOSIDES AND DOCUSATE SODIUM 1 TABLET: 50; 8.6 TABLET ORAL at 21:18

## 2022-09-21 RX ADMIN — CEFTRIAXONE 1 G: 1 INJECTION, POWDER, FOR SOLUTION INTRAMUSCULAR; INTRAVENOUS at 20:31

## 2022-09-21 RX ADMIN — HYDROMORPHONE HYDROCHLORIDE 0.2 MG: 0.2 INJECTION, SOLUTION INTRAMUSCULAR; INTRAVENOUS; SUBCUTANEOUS at 20:36

## 2022-09-21 RX ADMIN — INSULIN ASPART 1 UNITS: 100 INJECTION, SOLUTION INTRAVENOUS; SUBCUTANEOUS at 13:07

## 2022-09-21 RX ADMIN — INSULIN ASPART 1 UNITS: 100 INJECTION, SOLUTION INTRAVENOUS; SUBCUTANEOUS at 16:52

## 2022-09-21 RX ADMIN — KETOROLAC TROMETHAMINE 30 MG: 30 INJECTION, SOLUTION INTRAMUSCULAR at 00:57

## 2022-09-21 RX ADMIN — INSULIN ASPART 1 UNITS: 100 INJECTION, SOLUTION INTRAVENOUS; SUBCUTANEOUS at 01:02

## 2022-09-21 RX ADMIN — HYDROMORPHONE HYDROCHLORIDE 0.2 MG: 0.2 INJECTION, SOLUTION INTRAMUSCULAR; INTRAVENOUS; SUBCUTANEOUS at 10:24

## 2022-09-21 RX ADMIN — KETOROLAC TROMETHAMINE 30 MG: 30 INJECTION, SOLUTION INTRAMUSCULAR at 15:08

## 2022-09-21 RX ADMIN — TAMSULOSIN HYDROCHLORIDE 0.4 MG: 0.4 CAPSULE ORAL at 12:58

## 2022-09-21 RX ADMIN — INSULIN ASPART 1 UNITS: 100 INJECTION, SOLUTION INTRAVENOUS; SUBCUTANEOUS at 09:18

## 2022-09-21 RX ADMIN — INSULIN ASPART 1 UNITS: 100 INJECTION, SOLUTION INTRAVENOUS; SUBCUTANEOUS at 04:50

## 2022-09-21 RX ADMIN — PROCHLORPERAZINE EDISYLATE 10 MG: 5 INJECTION INTRAMUSCULAR; INTRAVENOUS at 20:28

## 2022-09-21 RX ADMIN — HYDROMORPHONE HYDROCHLORIDE 0.2 MG: 0.2 INJECTION, SOLUTION INTRAMUSCULAR; INTRAVENOUS; SUBCUTANEOUS at 04:47

## 2022-09-21 RX ADMIN — PANTOPRAZOLE SODIUM 40 MG: 40 INJECTION, POWDER, FOR SOLUTION INTRAVENOUS at 09:16

## 2022-09-21 ASSESSMENT — ACTIVITIES OF DAILY LIVING (ADL)
ADLS_ACUITY_SCORE: 22
ADLS_ACUITY_SCORE: 24
ADLS_ACUITY_SCORE: 22
ADLS_ACUITY_SCORE: 24
ADLS_ACUITY_SCORE: 24
ADLS_ACUITY_SCORE: 22
ADLS_ACUITY_SCORE: 22

## 2022-09-21 NOTE — PROGRESS NOTES
[facility] Urology Progress Note          Assessment and Plan:     Assessment:    POD 1 Cystoscopy, left ureteroscopy with thulium fiber laser lithotripsy, left ureteroscopy with stone basketing, left retrograde pyelogram, left ureteral stent placement    Small bowel obstruction (H)    UTI    Ureteral stone    Recurrent nephrolithiasis-requiring multiple surgical interventions    Exocrine pancreatic insufficiency    History of gastric bypass     DM      Plan:   -Continue with with indwelling ureteral stent.  We will set up to have this removed in our office in several weeks.  -Continue with IV antibiotics.  Follow cultures.  So far shows Proteus mirabilis.  Patient is on Rocephin.  Transition to culture specific when available and oral when clinically appropriate.  Would recommend treating for complicated urinary tract infection.  -Possible side effects with an indwelling ureteral stent such as urgency and frequency of urination, dysuria, hematuria, symptoms of urine reflux, and some achiness in the side. Indwelling ureteral stents need to be exchanged every three months or removed by three months.   -Flomax 0.4 mg once daily.    Ada Tello PA-C   [facility]  [phone]               Interval History:     Doing well.  Bradycardia.  Afebrile.  Denies dysuria.  Hemoglobin 10.2.  WBC 7.4 (6.8 (12.4)).  Urine culture shows 50,000 100,000 CFU per mL of Proteus mirabilis.  Sensitivities not completed.  Blood cultures in process.  On IV Rocephin. Denies N/V/F/C/SOB/CP.              Review of Systems:     The 5 point Review of Systems is negative other than noted in the HPI             Medications:     Current Facility-Administered Medications Ordered in Epic   Medication Dose Route Frequency Last Rate Last Admin     acetaminophen (TYLENOL) tablet 975 mg  975 mg Oral Q8H PRN   975 mg at 09/20/22 2027    Or     acetaminophen (TYLENOL) Suppository 650 mg  650 mg Rectal Q4H PRN         bisacodyl  (DULCOLAX) suppository 10 mg  10 mg Rectal Daily PRN         cefTRIAXone (ROCEPHIN) 1 g vial to attach to  mL bag for ADULTS or NS 50 mL bag for PEDS  1 g Intravenous Q24H   1 g at 09/20/22 2002     dextrose 5% and 0.9% NaCl + KCl 20 mEq/L infusion  100 mL/hr Intravenous Continuous 100 mL/hr at 09/21/22 0900 100 mL/hr at 09/21/22 0900     glucose gel 15-30 g  15-30 g Oral Q15 Min PRN        Or     dextrose 50 % injection 25-50 mL  25-50 mL Intravenous Q15 Min PRN        Or     glucagon injection 1 mg  1 mg Subcutaneous Q15 Min PRN         HYDROmorphone (DILAUDID) injection 0.2 mg  0.2 mg Intravenous Q2H PRN   0.2 mg at 09/21/22 1024     HYDROmorphone (DILAUDID) tablet 2 mg  2 mg Oral Q3H PRN         insulin aspart (NovoLOG) injection (RAPID ACTING)  1-6 Units Subcutaneous Q4H   1 Units at 09/21/22 0918     ketorolac (TORADOL) injection 30 mg  30 mg Intravenous Q6H PRN   30 mg at 09/21/22 0646     lidocaine (LMX4) cream   Topical Q1H PRN         lidocaine 1 % 0.1-1 mL  0.1-1 mL Other Q1H PRN   50 mg at 09/20/22 1514     melatonin tablet 1 mg  1 mg Oral At Bedtime PRN         naloxone (NARCAN) injection 0.2 mg  0.2 mg Intravenous Q2 Min PRN        Or     naloxone (NARCAN) injection 0.4 mg  0.4 mg Intravenous Q2 Min PRN        Or     naloxone (NARCAN) injection 0.2 mg  0.2 mg Intramuscular Q2 Min PRN        Or     naloxone (NARCAN) injection 0.4 mg  0.4 mg Intramuscular Q2 Min PRN         ondansetron (ZOFRAN ODT) ODT tab 4 mg  4 mg Oral Q6H PRN        Or     ondansetron (ZOFRAN) injection 4 mg  4 mg Intravenous Q6H PRN   4 mg at 09/20/22 1000     pantoprazole (PROTONIX) IV push injection 40 mg  40 mg Intravenous Daily with breakfast   40 mg at 09/21/22 0916     prochlorperazine (COMPAZINE) injection 10 mg  10 mg Intravenous Q6H PRN        Or     prochlorperazine (COMPAZINE) tablet 10 mg  10 mg Oral Q6H PRN        Or     prochlorperazine (COMPAZINE) suppository 25 mg  25 mg Rectal Q12H PRN         senna-docusate  (SENOKOT-S/PERICOLACE) 8.6-50 MG per tablet 1 tablet  1 tablet Oral At Bedtime         sodium chloride (PF) 0.9% PF flush 3 mL  3 mL Intracatheter Q8H   3 mL at 09/21/22 1030     sodium chloride (PF) 0.9% PF flush 3 mL  3 mL Intracatheter q1 min prn         No current Epic-ordered outpatient medications on file.                  Physical Exam:   Vitals were reviewed  Patient Vitals for the past 8 hrs:   BP Temp Temp src Pulse Resp   09/21/22 0700 121/61 97.9  F (36.6  C) Oral (!) 43 16     GEN: NAD, lying in bed  EYES: EOMI  MOUTH: MMM  NECK: Supple  RESP: Unlabored breathing  SKIN: Warm  NEURO: AAO  URO: Urinating on own.           Data:   No results found for: NTBNPI, NTBNP  Lab Results   Component Value Date    WBC 7.4 09/21/2022    WBC 6.8 09/20/2022    WBC 12.4 (H) 09/19/2022    HGB 10.2 (L) 09/21/2022    HGB 11.0 (L) 09/20/2022    HGB 13.0 09/19/2022    HCT 33.4 (L) 09/21/2022    HCT 35.7 09/20/2022    HCT 42.3 09/19/2022    MCV 97 09/21/2022    MCV 97 09/20/2022    MCV 96 09/19/2022     09/21/2022     09/20/2022     09/19/2022     Lab Results   Component Value Date    INR 1.03 05/10/2016    INR 1.29 (H) 05/10/2013    INR 1.01 05/10/2013     Urine Culture 50,000-100,000 CFU/mL Proteus mirabilis Abnormal

## 2022-09-21 NOTE — PROGRESS NOTES
[facility]  Medicine Progress Note - Hospitalist Service  Date of Admission:  9/19/2022    Assessment & Plan        Ms. Lynn Thompson is a 57 year old female with a history of gastric bypass surgery, chronic idiopathic pancreatitis with pancreatectomy and islet cell transplant, 3 or 4 small bowel obstructions and 2 lysis of adhesion surgeries, multiple episodes of kidney stones with previous lithotripsy, hypertension, depression, diabetes mellitus, struct of sleep apnea, hysterectomy and bilateral salpingooophorectomy, previous, bile duct stent admitted on 9/19/2022 with acute onset flank pain, found to have ureteral stone. S/p Cystoscopy, left ureteroscopy with thulium fiber laser lithotripsy, left ureteroscopy with stone basketing, left retrograde pyelogram, left ureteral stent placement 9/20. Pending culture sensitives prior to discharge and resolution of post op ileus.     Ureteral stone, left proximal   Hx of ureteral stones and stent placement   She has a very complex past medical history and presents with abdominal pain.  She reports feeling mildly unwell over the weekend.  At 4 AM this morning she developed diffuse abdominal pain and also some vomiting.  She describes it as sharp and radiating to her back.  She also has some abdominal distention.  She did have a bowel movement today.  She has had no difficulty urinating.  She has had several abdominal surgeries in the past and has had 3 or 4 small bowel obstructions as well as lysis of adhesions.  She is also had several kidney stones in the past and required lithotripsies.   Urinalysis showed 38 white cells.  CT of the abdomen and pelvis showed multiple previous surgical changes, a 9 x 4 mm left proximal ureteral stone and mildly dilated loops of small bowel as well as large amount of stool in the colon.   S/p Cystoscopy, left ureteroscopy with thulium fiber laser lithotripsy, left ureteroscopy with stone basketing, left  retrograde pyelogram, left ureteral stent placement 9/20. General surgery following for ileus as below   -- Urology following    -Continue with with indwelling ureteral stent.  We will set up to have this removed in our office in several weeks.   -Continue with IV antibiotics. Pending cultures. Proteus mirabilis growth pending sensitivities, will transition to oral and will treat for complicated UTI on discharge    -Possible side effects with an indwelling ureteral stent such as urgency and frequency of urination, dysuria, hematuria, symptoms of urine reflux, and some achiness in the side. Indwelling ureteral stents need to be exchanged every three months or removed by three months.    -Flomax 0.4 mg once daily   -Would recommend metabolic evaluation after this acute episode given strong family history of nephrolithiasis as well as recurrent nephrolithiasis  -- Pain medications PRN   -- Remains on ceftriaxone      Possible partial small bowel obstruction/ileus   Probable reactive ileus   CT showed mildly dilated loops of small bowel.  Unclear if this is an acute or more chronic finding. Her presenting symptoms could be secondary to ureteral stone and or partial small bowel obstruction.   -- General surgery following    - IV dilaudid, IV Toradol, Oxycodone, Tylenol prn    - Bowel: senna-s   - Diet: NPO except ice chips, med until flatus then will advance diet   - IVFs: D5 1/2 NS w/ K @ 100 mL/hr    Diabetes mellitus  She has been on low-dose Lantus insulin recently despite islet cell transplant.  -- We will treat with sliding scale insulin for now while n.p.o.   - monitor BG      Sleep apnea  -- Resume PTA CPAP        Diet: Clear Liquid Diet    DVT Prophylaxis: Pneumatic Compression Devices  Gonzalez Catheter: Not present  Central Lines: None  Cardiac Monitoring: None  Code Status: Full Code      Disposition Plan      Expected Discharge Date: 09/21/2022                The patient's care was discussed with the Bedside  Nurse and Patient.    Eulalia Finch DO  Hospitalist Service  [facility]  Securely message with the [employer] Web Console (learn more here)  Text page via [employer] Paging/Directory     Clinically Significant Risk Factors Present on Admission                  _____________________________________________________________________    Interval History   Patient reports feeling overall improved , but does have ongoing left sided flank pain worse with activity   No fever or chills , no nausea or vomiting or other symptoms   She has no other concerns or complaints today     Data reviewed today: I reviewed all medications, new labs and imaging results over the last 24 hours. I personally reviewed no images or EKG's today.     Physical Exam   Vital Signs: Temp: 97.6  F (36.4  C) Temp src: Oral BP: 118/64 Pulse: 62   Resp: 16 SpO2: 98 % O2 Device: None (Room air) Oxygen Delivery: 2 LPM  Weight: 116 lbs 9.6 oz     Constitutional: awake, alert, cooperative, no apparent distress, and appears stated age   HEENT: Normocephalic, atraumatic  Respiratory: Normal work of breathing, good air exchange, clear to auscultation bilaterally, no crackles or wheezing   Cardiovascular: Regular rate and rhythm, no murmurs appreciated  GI: Soft and nontender to palpation, nondistended, no rebound or guarding  Skin: normal skin color, texture, turgor   Musculoskeletal: Left sided flank pain, reproducible with palpation to left flank -stable from prior exam  No overlying skin changes   No deformities, no edema present   Neurologic: Alert and oriented, no focal deficits   Neuropsychiatric: General: normal, calm and normal eye contact     Data   Recent Labs   Lab 09/21/22  1306 09/21/22  0829 09/21/22  0752 09/21/22  0449 09/20/22  1304 09/20/22  1010 09/20/22  0057 09/19/22  1538 09/15/22  1115 09/15/22  0948   WBC  --   --  7.4  --   --  6.8  --  12.4*  --  8.3   HGB  --   --  10.2*  --   --  11.0*  --  13.0  --  13.0   MCV  --    --  97  --   --  97  --  96  --  96   PLT  --   --  336  --   --  378  --  422  --  432   NA  --  140  --   --   --  140  --  141  --  142   POTASSIUM  --  4.8  --   --   --  4.9  --  4.5  --  4.4   CHLORIDE  --  107  --   --   --  107  --  102  --  101   CO2  --  28  --   --   --  25  --  28  --  30*   BUN  --  12.9  --   --   --  18.7  --  24.3*  --  21.2   CR  --  0.72  --   --   --  0.67  --  0.80  --  0.83   ANIONGAP  --  5*  --   --   --  8  --  11  --  11   VALARIE  --  8.6  --   --   --  8.8  --  9.8  --  9.9   * 182*  --  168*   < > 172*   < > 131*   < > 170*  170*   ALBUMIN  --   --   --   --   --   --   --  4.2  --  4.3   PROTTOTAL  --   --   --   --   --   --   --  7.1  --  7.1   BILITOTAL  --   --   --   --   --   --   --  <0.2  --  0.2   ALKPHOS  --   --   --   --   --   --   --  151*  --  151*   ALT  --   --   --   --   --   --   --  56*  --  61*   AST  --   --   --   --   --   --   --  44*  --  46*    < > = values in this interval not displayed.     Recent Results (from the past 24 hour(s))   XR Surgery HEIDI L/T 5 Min Fluoro w Stills    Narrative    This exam was marked as non-reportable because it will not be read by a   radiologist or a [place] non-radiologist provider.         XR Gastrografin  Challenge    Narrative    XR GASTROGRAFIN CHALLENGE   9/20/2022 5:26 PM     HISTORY: possible ileus vs PSBO    COMPARISON: CT 9/19/2022      Impression    IMPRESSION: Large amount of formed stool throughout the colon without  pathologically distended loops of intestine. Administered oral  contrast reached the colon by 8 hours. Interval placement of left  ureteral stent. Visualized lung bases are clear. No acute bony  abnormality.    FRANCIS LINDSEY MD         SYSTEM ID:  [id number]   XR Abdomen 2 Views    Narrative    ABDOMEN TWO-THREE VIEW  9/21/2022 10:09 AM     HISTORY: SBO, follow up after gastrografin challenge.    COMPARISON: September 20, 2022      Impression    IMPRESSION: Contrast present  throughout the colon. Left ureteral stent  in place. No definite dilated bowel demonstrated.    DEBBY CUNHA MD         SYSTEM ID:  [id number]     Medications     dextrose 5% and 0.9% NaCl with potassium chloride 20 mEq 100 mL/hr (09/21/22 0900)       cefTRIAXone  1 g Intravenous Q24H     insulin aspart  1-6 Units Subcutaneous Q4H     pantoprazole  40 mg Intravenous Daily with breakfast     senna-docusate  1 tablet Oral At Bedtime     sodium chloride (PF)  3 mL Intracatheter Q8H     tamsulosin  0.4 mg Oral Daily

## 2022-09-21 NOTE — PROGRESS NOTES
[id number]    Vital Signs: WNL. Patient afebrile. SpO2 >90% on RA. Patient on continuous capnography.   Pain/Comfort: patient rating pain as a 3-6/10. PRN pain medications and heat applied. Patient stated adequate relief with current pain medications. Patient encouraged to call RN if patient starts having increased pain. Patient stated an understanding.   Assessment: PIV site c/d/i with IVF infusing per MAR.   Diet: patient NPO. Patient tolerating a few sips of water with meds.   Output: patient voiding independently.   Activity/Ambulation: patient up to bathroom with SBA.   Social: patient calm and cooperative.   Plan: Pain control. Maintain PIV. IVF. IV ABX. Monitor I&O. [place] VS. Capnography overnight. Will continue to monitor and will notify service with any questions or concerns.

## 2022-09-21 NOTE — PLAN OF CARE
Goal Outcome Evaluation:        Pt is alert and oriented, up with standby assist in the room. Pt voiding without difficulty after the cysto with stent placement yesterday. Pt taking IV toradol every 6 hours with prn dilaudid in between as needed. Pt remains NPO with sips and chips.

## 2022-09-21 NOTE — PROGRESS NOTES
[facility]    General Surgery Progress Note          Assessment and Plan:   Assessment:    Lynn Thompson is a 57 year old female with extensive abdominal surgical history and previous bowel obstructions in the past, who presents with nausea, vomiting and pain radiating to the left back.  She was found to have possible partial bowel obstruction on CT and left ureteral calculus.       - s/p Cystoscopy with left retrograde pyelogram, left ureteroscopy, thulium laser lithotripsy of left ureteral calculus, stone basketing and left ureteral stent insertion   - gastrografin challenge 9/20 shows contrast in colon         Plan:   Pain mgmt: IV dilaudid, IV Toradol, Oxycodone, Tylenol prn   Bowel: senna-s  Diet: NPO except ice chips, med until flatus then will advance diet  IVFs: D5 1/2 NS w/ K @ 100 mL/hr  Activity: encourage ambulation 3-4 x daily  Antibiotics: Rocephin per Urology  DVT prophylaxis: ambulation   Dispo: 1-2 days depending on return of bowel function and diet tolerance         Addendum  Seen and agree with above. Doing well. Not much flatus but overall feeling well and would like to try liquids. Abdomen is soft and nondistended. Clears for now until passing flatus  Eulalia Ballard MD          Interval History:   Pt resting in bed.  Feeling better since admission.  Minimal to no abdominal discomfort.  Mild nausea, mainly from smell of plastic from oxygen tubing.  Denies nausea, feels less bloated today.  Passing a little gas but not consistently, no BM.          Physical Exam:   Temp: 98  F (36.7  C) Temp src: Oral BP: 139/64 Pulse: 61   Resp: 16   SpO2: 97 % O2 Device: None (Room air) Oxygen Delivery: 2 LPM      I/O last 3 completed shifts:  In: 1250 [P.O.:150; I.V.:1100]  Out: 1001 [Urine:1000; Blood:1]    Constitutional: alert and no distress   Abdomen: soft, non-tender. Mildly tympanic to percussion upper abdomen            Data:   Data   Recent Labs   Lab 09/21/22  9285  09/21/22  0449 09/21/22  0100 09/20/22  2135 09/20/22  1304 09/20/22  1010 09/20/22  0057 09/19/22  1538 09/15/22  1115 09/15/22  0948   WBC 7.4  --   --   --   --  6.8  --  12.4*  --  8.3   HGB 10.2*  --   --   --   --  11.0*  --  13.0  --  13.0   MCV 97  --   --   --   --  97  --  96  --  96     --   --   --   --  378  --  422  --  432   NA  --   --   --   --   --  140  --  141  --  142   POTASSIUM  --   --   --   --   --  4.9  --  4.5  --  4.4   CHLORIDE  --   --   --   --   --  107  --  102  --  101   CO2  --   --   --   --   --  25  --  28  --  30*   BUN  --   --   --   --   --  18.7  --  24.3*  --  21.2   CR  --   --   --   --   --  0.67  --  0.80  --  0.83   ANIONGAP  --   --   --   --   --  8  --  11  --  11   VALARIE  --   --   --   --   --  8.8  --  9.8  --  9.9   GLC  --  168* 150* 126*   < > 172*   < > 131*   < > 170*  170*   ALBUMIN  --   --   --   --   --   --   --  4.2  --  4.3   PROTTOTAL  --   --   --   --   --   --   --  7.1  --  7.1   BILITOTAL  --   --   --   --   --   --   --  <0.2  --  0.2   ALKPHOS  --   --   --   --   --   --   --  151*  --  151*   ALT  --   --   --   --   --   --   --  56*  --  61*   AST  --   --   --   --   --   --   --  44*  --  46*    < > = values in this interval not displayed.        Kandis Carson PA-C

## 2022-09-22 LAB
GLUCOSE BLDC GLUCOMTR-MCNC: 154 MG/DL (ref 70–99)
GLUCOSE BLDC GLUCOMTR-MCNC: 164 MG/DL (ref 70–99)
GLUCOSE BLDC GLUCOMTR-MCNC: 175 MG/DL (ref 70–99)
GLUCOSE BLDC GLUCOMTR-MCNC: 204 MG/DL (ref 70–99)
GLUCOSE BLDC GLUCOMTR-MCNC: 299 MG/DL (ref 70–99)
GLUCOSE BLDC GLUCOMTR-MCNC: 303 MG/DL (ref 70–99)

## 2022-09-22 PROCEDURE — 250N000013 HC RX MED GY IP 250 OP 250 PS 637: Performed by: PHYSICIAN ASSISTANT

## 2022-09-22 PROCEDURE — 250N000013 HC RX MED GY IP 250 OP 250 PS 637: Performed by: INTERNAL MEDICINE

## 2022-09-22 PROCEDURE — 250N000011 HC RX IP 250 OP 636: Performed by: STUDENT IN AN ORGANIZED HEALTH CARE EDUCATION/TRAINING PROGRAM

## 2022-09-22 PROCEDURE — 120N000001 HC R&B MED SURG/OB

## 2022-09-22 PROCEDURE — 99231 SBSQ HOSP IP/OBS SF/LOW 25: CPT | Performed by: PHYSICIAN ASSISTANT

## 2022-09-22 PROCEDURE — 99233 SBSQ HOSP IP/OBS HIGH 50: CPT | Performed by: INTERNAL MEDICINE

## 2022-09-22 PROCEDURE — 99231 SBSQ HOSP IP/OBS SF/LOW 25: CPT | Performed by: SURGERY

## 2022-09-22 PROCEDURE — 258N000001 HC RX 258: Performed by: STUDENT IN AN ORGANIZED HEALTH CARE EDUCATION/TRAINING PROGRAM

## 2022-09-22 PROCEDURE — C9113 INJ PANTOPRAZOLE SODIUM, VIA: HCPCS | Performed by: STUDENT IN AN ORGANIZED HEALTH CARE EDUCATION/TRAINING PROGRAM

## 2022-09-22 RX ORDER — OXYBUTYNIN CHLORIDE 5 MG/1
5 TABLET ORAL 3 TIMES DAILY PRN
Status: DISCONTINUED | OUTPATIENT
Start: 2022-09-22 | End: 2022-09-23 | Stop reason: HOSPADM

## 2022-09-22 RX ORDER — BISACODYL 10 MG
10 SUPPOSITORY, RECTAL RECTAL ONCE
Status: COMPLETED | OUTPATIENT
Start: 2022-09-22 | End: 2022-09-22

## 2022-09-22 RX ADMIN — SENNOSIDES AND DOCUSATE SODIUM 1 TABLET: 50; 8.6 TABLET ORAL at 21:01

## 2022-09-22 RX ADMIN — POTASSIUM CHLORIDE, DEXTROSE MONOHYDRATE AND SODIUM CHLORIDE 100 ML/HR: 150; 5; 900 INJECTION, SOLUTION INTRAVENOUS at 19:35

## 2022-09-22 RX ADMIN — PANTOPRAZOLE SODIUM 40 MG: 40 INJECTION, POWDER, FOR SOLUTION INTRAVENOUS at 08:03

## 2022-09-22 RX ADMIN — POTASSIUM CHLORIDE, DEXTROSE MONOHYDRATE AND SODIUM CHLORIDE 100 ML/HR: 150; 5; 900 INJECTION, SOLUTION INTRAVENOUS at 09:17

## 2022-09-22 RX ADMIN — HYDROMORPHONE HYDROCHLORIDE 0.2 MG: 0.2 INJECTION, SOLUTION INTRAMUSCULAR; INTRAVENOUS; SUBCUTANEOUS at 19:02

## 2022-09-22 RX ADMIN — INSULIN ASPART 2 UNITS: 100 INJECTION, SOLUTION INTRAVENOUS; SUBCUTANEOUS at 17:25

## 2022-09-22 RX ADMIN — BISACODYL 10 MG: 10 SUPPOSITORY RECTAL at 12:17

## 2022-09-22 RX ADMIN — HYDROMORPHONE HYDROCHLORIDE 0.2 MG: 0.2 INJECTION, SOLUTION INTRAMUSCULAR; INTRAVENOUS; SUBCUTANEOUS at 08:00

## 2022-09-22 RX ADMIN — INSULIN ASPART 4 UNITS: 100 INJECTION, SOLUTION INTRAVENOUS; SUBCUTANEOUS at 21:40

## 2022-09-22 RX ADMIN — OXYBUTYNIN CHLORIDE 5 MG: 5 TABLET ORAL at 12:13

## 2022-09-22 RX ADMIN — HYDROMORPHONE HYDROCHLORIDE 0.2 MG: 0.2 INJECTION, SOLUTION INTRAMUSCULAR; INTRAVENOUS; SUBCUTANEOUS at 10:36

## 2022-09-22 RX ADMIN — CEFTRIAXONE 1 G: 1 INJECTION, POWDER, FOR SOLUTION INTRAMUSCULAR; INTRAVENOUS at 21:01

## 2022-09-22 RX ADMIN — INSULIN ASPART 1 UNITS: 100 INJECTION, SOLUTION INTRAVENOUS; SUBCUTANEOUS at 05:40

## 2022-09-22 RX ADMIN — TAMSULOSIN HYDROCHLORIDE 0.4 MG: 0.4 CAPSULE ORAL at 08:03

## 2022-09-22 RX ADMIN — HYDROMORPHONE HYDROCHLORIDE 0.2 MG: 0.2 INJECTION, SOLUTION INTRAMUSCULAR; INTRAVENOUS; SUBCUTANEOUS at 13:39

## 2022-09-22 RX ADMIN — HYDROMORPHONE HYDROCHLORIDE 0.2 MG: 0.2 INJECTION, SOLUTION INTRAMUSCULAR; INTRAVENOUS; SUBCUTANEOUS at 15:50

## 2022-09-22 RX ADMIN — INSULIN ASPART 1 UNITS: 100 INJECTION, SOLUTION INTRAVENOUS; SUBCUTANEOUS at 09:17

## 2022-09-22 RX ADMIN — HYDROMORPHONE HYDROCHLORIDE 2 MG: 2 TABLET ORAL at 21:00

## 2022-09-22 RX ADMIN — INSULIN ASPART 4 UNITS: 100 INJECTION, SOLUTION INTRAVENOUS; SUBCUTANEOUS at 13:42

## 2022-09-22 RX ADMIN — POTASSIUM CHLORIDE, DEXTROSE MONOHYDRATE AND SODIUM CHLORIDE 100 ML/HR: 150; 5; 900 INJECTION, SOLUTION INTRAVENOUS at 00:08

## 2022-09-22 RX ADMIN — INSULIN ASPART 1 UNITS: 100 INJECTION, SOLUTION INTRAVENOUS; SUBCUTANEOUS at 01:34

## 2022-09-22 ASSESSMENT — ACTIVITIES OF DAILY LIVING (ADL)
ADLS_ACUITY_SCORE: 24

## 2022-09-22 NOTE — PROGRESS NOTES
[facility] Urology Progress Note          Assessment and Plan:     Assessment:    POD 2 Cystoscopy, left ureteroscopy with thulium fiber laser lithotripsy, left ureteroscopy with stone basketing, left retrograde pyelogram, left ureteral stent placement    Small bowel obstruction (H)    UTI    Ureteral stone    Recurrent nephrolithiasis-requiring multiple surgical interventions    Exocrine pancreatic insufficiency    History of gastric bypass     DM      Plan:   -Continue with with indwelling ureteral stent.  We will set up to have this removed in our office in several weeks.  -Continue with IV antibiotics.  Follow cultures- Proteus mirabilis.  Patient is on Rocephin.  Transition to culture specific when available and oral when clinically appropriate.  Would recommend treating for complicated urinary tract infection.  -Possible side effects with an indwelling ureteral stent such as urgency and frequency of urination, dysuria, hematuria, symptoms of urine reflux, and some achiness in the side. Indwelling ureteral stents need to be exchanged every three months or removed by three months.   -Flomax 0.4 mg once daily.  -Will add oxybutinin 5 mg 3 times daily as needed for bladder spasms.  -Okay to discharge from urology perspective.  We will need to discharge with antibiotics for complicated urinary tract infection, Flomax 0.4 mg once daily, and oxybutynin 5 mg 3 times daily as needed for bladder spasms.  -Patient is prone to yeast infections with antibiotics.  Would recommend sending with Diflucan 150 mg x 1 and able to repeat once if still having symptoms.  -Will sign off.  Please contact us with any urological concerns.    Ada Tello PA-C   [facility]  [phone]               Interval History:     Doing well, but had some cramping last night.  Some nausea.  Denies V/F/C/SOB/CP. WBC 7.4 (6.8 (12.4)).  Urine culture shows 50,000-100,000 CFU per mL of Proteus mirabilis.  Blood cultures no  growth to date.  On IV Rocephin.               Review of Systems:     The 5 point Review of Systems is negative other than noted in the HPI             Medications:     Current Facility-Administered Medications Ordered in Epic   Medication Dose Route Frequency Last Rate Last Admin     acetaminophen (TYLENOL) tablet 975 mg  975 mg Oral Q8H PRN   975 mg at 09/20/22 2027    Or     acetaminophen (TYLENOL) Suppository 650 mg  650 mg Rectal Q4H PRN         bisacodyl (DULCOLAX) suppository 10 mg  10 mg Rectal Once         bisacodyl (DULCOLAX) suppository 10 mg  10 mg Rectal Daily PRN         cefTRIAXone (ROCEPHIN) 1 g vial to attach to  mL bag for ADULTS or NS 50 mL bag for PEDS  1 g Intravenous Q24H   1 g at 09/21/22 2031     dextrose 5% and 0.9% NaCl + KCl 20 mEq/L infusion  100 mL/hr Intravenous Continuous 100 mL/hr at 09/22/22 0917 100 mL/hr at 09/22/22 0917     glucose gel 15-30 g  15-30 g Oral Q15 Min PRN        Or     dextrose 50 % injection 25-50 mL  25-50 mL Intravenous Q15 Min PRN        Or     glucagon injection 1 mg  1 mg Subcutaneous Q15 Min PRN         HYDROmorphone (DILAUDID) injection 0.2 mg  0.2 mg Intravenous Q2H PRN   0.2 mg at 09/22/22 0800     HYDROmorphone (DILAUDID) tablet 2 mg  2 mg Oral Q3H PRN         insulin aspart (NovoLOG) injection (RAPID ACTING)  1-6 Units Subcutaneous Q4H   1 Units at 09/22/22 0917     ketorolac (TORADOL) injection 30 mg  30 mg Intravenous Q6H PRN   30 mg at 09/21/22 1508     lidocaine (LMX4) cream   Topical Q1H PRN         lidocaine 1 % 0.1-1 mL  0.1-1 mL Other Q1H PRN   50 mg at 09/20/22 1514     melatonin tablet 1 mg  1 mg Oral At Bedtime PRN         naloxone (NARCAN) injection 0.2 mg  0.2 mg Intravenous Q2 Min PRN        Or     naloxone (NARCAN) injection 0.4 mg  0.4 mg Intravenous Q2 Min PRN        Or     naloxone (NARCAN) injection 0.2 mg  0.2 mg Intramuscular Q2 Min PRN        Or     naloxone (NARCAN) injection 0.4 mg  0.4 mg Intramuscular Q2 Min PRN          ondansetron (ZOFRAN ODT) ODT tab 4 mg  4 mg Oral Q6H PRN        Or     ondansetron (ZOFRAN) injection 4 mg  4 mg Intravenous Q6H PRN   4 mg at 09/20/22 1000     pantoprazole (PROTONIX) IV push injection 40 mg  40 mg Intravenous Daily with breakfast   40 mg at 09/22/22 0803     prochlorperazine (COMPAZINE) injection 10 mg  10 mg Intravenous Q6H PRN   10 mg at 09/21/22 2028    Or     prochlorperazine (COMPAZINE) tablet 10 mg  10 mg Oral Q6H PRN        Or     prochlorperazine (COMPAZINE) suppository 25 mg  25 mg Rectal Q12H PRN         senna-docusate (SENOKOT-S/PERICOLACE) 8.6-50 MG per tablet 1 tablet  1 tablet Oral At Bedtime   1 tablet at 09/21/22 2118     sodium chloride (PF) 0.9% PF flush 3 mL  3 mL Intracatheter Q8H   3 mL at 09/21/22 1803     sodium chloride (PF) 0.9% PF flush 3 mL  3 mL Intracatheter q1 min prn         tamsulosin (FLOMAX) capsule 0.4 mg  0.4 mg Oral Daily   0.4 mg at 09/22/22 0803     No current [facility]-ordered outpatient medications on file.                  Physical Exam:   Vitals were reviewed  Patient Vitals for the past 8 hrs:   BP Temp Temp src Pulse Resp SpO2   09/22/22 0734 134/47 97.6  F (36.4  C) Temporal (!) 44 16 96 %     GEN: NAD, lying in bed  EYES: EOMI  MOUTH: MMM  NECK: Supple  RESP: Unlabored breathing  SKIN: Warm  NEURO: AAO  URO: Urinating on own.           Data:   No results found for: NTBNPI, NTBNP  Lab Results   Component Value Date    WBC 7.4 09/21/2022    WBC 6.8 09/20/2022    WBC 12.4 (H) 09/19/2022    HGB 10.2 (L) 09/21/2022    HGB 11.0 (L) 09/20/2022    HGB 13.0 09/19/2022    HCT 33.4 (L) 09/21/2022    HCT 35.7 09/20/2022    HCT 42.3 09/19/2022    MCV 97 09/21/2022    MCV 97 09/20/2022    MCV 96 09/19/2022     09/21/2022     09/20/2022     09/19/2022     Lab Results   Component Value Date    INR 1.03 05/10/2016    INR 1.29 (H) 05/10/2013    INR 1.01 05/10/2013     Urine Culture 50,000-100,000 CFU/mL Proteus mirabilis Abnormal

## 2022-09-22 NOTE — PLAN OF CARE
Patient vital signs are at baseline: Yes  Patient able to ambulate as they were prior to admission or with assist devices provided by therapies during their stay:  Yes  Patient MUST void prior to discharge:  Yes  Patient able to tolerate oral intake:  Yes  Pain has adequate pain control using Oral analgesics:  Yes  Does patient have an identified :  Yes  Has goal D/C date and time been discussed with patient:  Yes      Pt is alert and oriented x4. Pt was given Dilaudid for abdominal pain. Pt is on IVF D5 and 0.9% NaCl + KCL 20 mEQ @ 100ml/hr. Pt is on blood sugar checks. Pt surgical dressing is open to air. No bleeding or drainage noted. Pt was was given compazine for nausea. Pt is SBA in transfer and cares. Call light within reach. Will continue to monitor and assess pt.

## 2022-09-22 NOTE — PLAN OF CARE
Day RN ([phone])     Patient vital signs are at baseline: Yes  Patient able to ambulate as they were prior to admission or with assist devices provided by therapies during their stay:  Yes  Patient MUST void prior to discharge:  Yes  Patient able to tolerate oral intake:  Yes - was switched to a regular diet this evening and tolerating well so far w/o nausea.  Pain has adequate pain control using Oral analgesics:  No,  Reason:  Utilizing IV dilaudid much of shift while waiting for diet to be advanced.  Does patient have an identified :  Yes  Has goal D/C date and time been discussed with patient:  Yes     Pt A/O x4.  VSS and afebrile.  Pain managed adequately with IV dilaudid and PRN PO oxybutynin (for urinary spasms) - may switch to PO dilaudid now that onto regular diet, declined for now - heating pad for comfort.  CMS intact.  Skin wdl.  Up SBA independently in room.  Voiding adequately.  Suppository given this shift as scheduled, x2 small formed bm's since.  Tolerating regular diet well.  , 299, and 204.  IV Rocephin.  Plan is TBD.  Will continue to monitor.

## 2022-09-22 NOTE — PROGRESS NOTES
[facility]  Medicine Progress Note - Hospitalist Service  Date of Admission:  9/19/2022    Assessment & Plan        Ms. Lynn Thompson is a 57 year old female with a history of gastric bypass surgery, chronic idiopathic pancreatitis with pancreatectomy and islet cell transplant, 3 or 4 small bowel obstructions and 2 lysis of adhesion surgeries, multiple episodes of kidney stones with previous lithotripsy, hypertension, depression, diabetes mellitus, struct of sleep apnea, hysterectomy and bilateral salpingooophorectomy, previous, bile duct stent admitted on 9/19/2022 with acute onset flank pain, found to have ureteral stone. S/p Cystoscopy, left ureteroscopy with thulium fiber laser lithotripsy, left ureteroscopy with stone basketing, left retrograde pyelogram, left ureteral stent placement 9/20.     Ureteral stone, left proximal   Hx of ureteral stones and stent placement   She has a very complex past medical history and presents with abdominal pain.  She reports feeling mildly unwell over the weekend.  At 4 AM this morning she developed diffuse abdominal pain and also some vomiting.  She describes it as sharp and radiating to her back.  She also has some abdominal distention.  She did have a bowel movement today.  She has had no difficulty urinating.  She has had several abdominal surgeries in the past and has had 3 or 4 small bowel obstructions as well as lysis of adhesions.  She is also had several kidney stones in the past and required lithotripsies.   Urinalysis showed 38 white cells.  CT of the abdomen and pelvis showed multiple previous surgical changes, a 9 x 4 mm left proximal ureteral stone and mildly dilated loops of small bowel as well as large amount of stool in the colon.   S/p Cystoscopy, left ureteroscopy with thulium fiber laser lithotripsy, left ureteroscopy with stone basketing, left retrograde pyelogram, left ureteral stent placement 9/20. General surgery following for  ileus as below   -- Urology consulted and has been following    -Continue with with indwelling ureteral stent.  We will set up to have this removed in our office in several weeks.   -Continued  with IV antibiotics. Cultures followed .. Proteus mirabilis grew  pending sensitivities, will transition to oral and will treat for complicated UTI on discharge    -Possible side effects with an indwelling ureteral stent such as urgency and frequency of urination, dysuria, hematuria, symptoms of urine reflux, and some achiness in the side. Indwelling ureteral stents need to be exchanged every three months or removed by three months.    -Flomax 0.4 mg once daily   -Would recommend metabolic evaluation after this acute episode given strong family history of nephrolithiasis as well as recurrent nephrolithiasis  -- Pain medications PRN   -- Remains on ceftriaxone      Possible partial small bowel obstruction/ileus   Probable reactive ileus   CT showed mildly dilated loops of small bowel.  Unclear if this is an acute or more chronic finding. Her presenting symptoms could be secondary to ureteral stone and or partial small bowel obstruction.   -- General surgery consulted and has been  following    - treated with  IV dilaudid, IV Toradol, Oxycodone, Tylenol prn    - Bowel: senna-s   - Diet: ADAT per Surgery    - IVFs: D5 1/2 NS w/ K @ 100 mL/hr    Diabetes mellitus  She has been on low-dose Lantus insulin recently despite islet cell transplant.  -- We will treat with sliding scale insulin   - monitor BG      Sleep apnea  -- Resumed PTA CPAP        Diet: Full Liquid Diet    DVT Prophylaxis: Pneumatic Compression Devices  Gonzalez Catheter: Not present  Central Lines: None  Cardiac Monitoring: None  Code Status: Full Code      Disposition Plan  per Surgery team      The patient's care was discussed with the Bedside Nurse and Patient.    Andrew Fowler MD  Hospitalist Service  [facility]  Securely message with  the [employer] Web Console (learn more here)  Text page via [facility] Paging/Directory     Clinically Significant Risk Factors Present on Admission                  _____________________________________________________________________    Interval History   Patient is seen and examined by me today and medical record reviewed.Overnight events noted and care discussed with nursing staff.    She feels okay but still having lower abdominal pain. Has passed gas but no BM         Data reviewed today: I reviewed all medications, new labs and imaging results over the last 24 hours. I personally reviewed no images or EKG's today.     Physical Exam   Vital Signs: Temp: 97.8  F (36.6  C) Temp src: Temporal BP: 125/49 Pulse: (!) 49   Resp: 16 SpO2: 96 % O2 Device: None (Room air)    Weight: 116 lbs 9.6 oz     Constitutional: awake, alert, cooperative, no apparent distress, and appears stated age   HEENT: Normocephalic, atraumatic  Respiratory: Normal work of breathing, good air exchange, clear to auscultation bilaterally, no crackles or wheezing   Cardiovascular: Regular rate and rhythm, no murmurs appreciated  GI: Soft and nontender to palpation, nondistended, no rebound or guarding  Skin: normal skin color, texture, turgor   Musculoskeletal: Left sided flank pain, reproducible with palpation to left flank -stable from prior exam  No overlying skin changes   No deformities, no edema present   Neurologic: Alert and oriented, no focal deficits   Neuropsychiatric: General: normal, calm and normal eye contact     Data   Recent Labs   Lab 09/22/22  1257 09/22/22  0854 09/22/22  0514 09/21/22  1306 09/21/22  0829 09/21/22  0752 09/20/22  1304 09/20/22  1010 09/20/22  0057 09/19/22  1538   WBC  --   --   --   --   --  7.4  --  6.8  --  12.4*   HGB  --   --   --   --   --  10.2*  --  11.0*  --  13.0   MCV  --   --   --   --   --  97  --  97  --  96   PLT  --   --   --   --   --  336  --  378  --  422   NA  --   --   --   --  140  --   --  140   --  141   POTASSIUM  --   --   --   --  4.8  --   --  4.9  --  4.5   CHLORIDE  --   --   --   --  107  --   --  107  --  102   CO2  --   --   --   --  28  --   --  25  --  28   BUN  --   --   --   --  12.9  --   --  18.7  --  24.3*   CR  --   --   --   --  0.72  --   --  0.67  --  0.80   ANIONGAP  --   --   --   --  5*  --   --  8  --  11   VALARIE  --   --   --   --  8.6  --   --  8.8  --  9.8   * 175* 164*   < > 182*  --    < > 172*   < > 131*   ALBUMIN  --   --   --   --   --   --   --   --   --  4.2   PROTTOTAL  --   --   --   --   --   --   --   --   --  7.1   BILITOTAL  --   --   --   --   --   --   --   --   --  <0.2   ALKPHOS  --   --   --   --   --   --   --   --   --  151*   ALT  --   --   --   --   --   --   --   --   --  56*   AST  --   --   --   --   --   --   --   --   --  44*    < > = values in this interval not displayed.     No results found for this or any previous visit (from the past 24 hour(s)).  Medications     dextrose 5% and 0.9% NaCl with potassium chloride 20 mEq 100 mL/hr (09/22/22 1345)       cefTRIAXone  1 g Intravenous Q24H     insulin aspart  1-6 Units Subcutaneous Q4H     pantoprazole  40 mg Intravenous Daily with breakfast     senna-docusate  1 tablet Oral At Bedtime     sodium chloride (PF)  3 mL Intracatheter Q8H     tamsulosin  0.4 mg Oral Daily

## 2022-09-22 NOTE — PLAN OF CARE
Day RN ([phone])    Pt arrived to unit from other unit at around 1745.  Transferred from  to chair by ambulating SBA.  Tolerated well.  VSS and afebrile.  Rates pain 6/10 and requesting pain medication intervention when available.  Educated to room, call light, and staff.  Will continue to monitor.    Patient vital signs are at baseline: Yes  Patient able to ambulate as they were prior to admission or with assist devices provided by therapies during their stay:  Yes  Patient MUST void prior to discharge:  Yes  Patient able to tolerate oral intake:  Yes  Pain has adequate pain control using Oral analgesics:  No,  Reason:  Utilizing IV dilaudid and toradol at this time.  Does patient have an identified :  Yes  Has goal D/C date and time been discussed with patient:  Yes    Pt A/O x4.  VSS and afebrile.  Pain managed adequately with IV dilaudid.  CMS intact.  Skin wdl.  Up SBA with walker and gait belt.  Voiding adequately.  Tolerating clear liquid diet well.  IV Rocephin.  Plan is TBD.  Will continue to monitor.

## 2022-09-22 NOTE — UTILIZATION REVIEW
Admission Status; Secondary Review Determination       Under the authority of the Utilization Management Committee, the utilization review process indicated a secondary review on the above patient. The review outcome is based on review of the medical records, discussions with staff, and applying clinical experience noted on the date of the review.     (x) Inpatient Status Appropriate - This patient's medical care is consistent with medical management for inpatient care and reasonable inpatient medical practice.     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 S/p Cystoscopy, left ureteroscopy with thulium fiber laser lithotripsy, left ureteroscopy with stone basketing, left retrograde pyelogram, left ureteral stent placement 9/20.     Patient requires inpatient admission versus short stay observation or outpatient treatment for the following reasons: Patient stay was complicated with partial small bowel obstruction significant abdominal pain requiring intravenous Dilaudid, intravenous Toradol, oxycodone, n.p.o. status, IV fluid and antiemetic, general surgery consultation.  She has required more than 6 doses of intravenous Dilaudid to control her pain for 24 hours, last dose was at 8 AM prior to this review on 9/22.  General surgery is concerned for reactive ileus secondary to her ureteral stone, she still has not passed flatus or had return of bowel function on 9/22.  Also her culture is showing Proteus mirabilis confirming infection in the  setting of kidney stone and obstruction.   This patient has received 3 days of complex hospital care so far and is expected to continue to require at least another day prior to discharging.    The expected length of stay at the time of admission was more than 2 nights because of the severity of illness, intensity of service provided, and risk for adverse outcome. Inpatient admission is appropriate.         This document was produced using voice recognition software       The information on this document is developed by the utilization review team in order for the business office to ensure compliance. This only denotes the appropriateness of proper admission status and does not reflect the quality of care rendered.   The definitions of Inpatient Status and Observation Status used in making the determination above are those provided in the CMS Coverage Manual, Chapter 1 and Chapter 6, section 70.4.   Sincerely,   DAJA BLACK MD   System Medical Director   Utilization Management   [facility].

## 2022-09-22 NOTE — PROGRESS NOTES
[facility]    General Surgery Progress Note          Assessment and Plan:   Assessment:    Lynn Thompson is a 57 year old female with extensive abdominal surgical history and previous bowel obstructions in the past, who presents with nausea, vomiting and pain radiating to the left back.  She was found to have possible partial bowel obstruction on CT and left ureteral calculus.       - s/p Cystoscopy with left retrograde pyelogram, left ureteroscopy, thulium laser lithotripsy of left ureteral calculus, stone basketing and left ureteral stent insertion   - gastrografin challenge 9/20 shows contrast in colon         Plan:   Pain mgmt: IV dilaudid, IV Toradol, Oxycodone, Tylenol prn   Bowel: senna-s, suppository today  Diet: full liquid  IVFs: D5 1/2 NS w/ K @ 100 mL/hr  Activity: encourage ambulation 3-4 x daily  Antibiotics: Rocephin per Urology  DVT prophylaxis: ambulation   Dispo: 1-2 days depending on return of bowel function and diet tolerance  Seen and agree, she has decided to take a suppository, waiting on results. Abd is soft and with only a mild degree of tenderness in LLQ, suspect more driven by her stent.  Hopefully home tomorrow.             Interval History:   Pt resting in bed.  Reports no abdominal pain.  Had some mild nausea last night.  Passing gas but no BM.  Tolerated clears yesterday, interested in trying more food choices.          Physical Exam:   Temp: 97.6  F (36.4  C) Temp src: Temporal BP: 134/47 Pulse: (!) 44   Resp: 16   SpO2: 96 % O2 Device: None (Room air)        I/O last 3 completed shifts:  In: 120 [P.O.:120]  Out: 800 [Urine:800]    Constitutional: alert and no distress   Abdomen: soft, non-tender.             Data:   Data   Recent Labs   Lab 09/22/22  0854 09/22/22  0514 09/22/22  0102 09/21/22  1306 09/21/22  0829 09/21/22  0752 09/20/22  1304 09/20/22  1010 09/20/22  0057 09/19/22  1538 09/15/22  1115 09/15/22  0948   WBC  --   --   --   --   --  7.4  --   6.8  --  12.4*  --  8.3   HGB  --   --   --   --   --  10.2*  --  11.0*  --  13.0  --  13.0   MCV  --   --   --   --   --  97  --  97  --  96  --  96   PLT  --   --   --   --   --  336  --  378  --  422  --  432   NA  --   --   --   --  140  --   --  140  --  141  --  142   POTASSIUM  --   --   --   --  4.8  --   --  4.9  --  4.5  --  4.4   CHLORIDE  --   --   --   --  107  --   --  107  --  102  --  101   CO2  --   --   --   --  28  --   --  25  --  28  --  30*   BUN  --   --   --   --  12.9  --   --  18.7  --  24.3*  --  21.2   CR  --   --   --   --  0.72  --   --  0.67  --  0.80  --  0.83   ANIONGAP  --   --   --   --  5*  --   --  8  --  11  --  11   VALARIE  --   --   --   --  8.6  --   --  8.8  --  9.8  --  9.9   * 164* 154*   < > 182*  --    < > 172*   < > 131*   < > 170*  170*   ALBUMIN  --   --   --   --   --   --   --   --   --  4.2  --  4.3   PROTTOTAL  --   --   --   --   --   --   --   --   --  7.1  --  7.1   BILITOTAL  --   --   --   --   --   --   --   --   --  <0.2  --  0.2   ALKPHOS  --   --   --   --   --   --   --   --   --  151*  --  151*   ALT  --   --   --   --   --   --   --   --   --  56*  --  61*   AST  --   --   --   --   --   --   --   --   --  44*  --  46*    < > = values in this interval not displayed.        Kandis Carson PA-C

## 2022-09-22 NOTE — PROVIDER NOTIFICATION
Paged Dr. Fowler at 1559:  Pt requesting if could advance to a soft diet?  Had two small bm's this afternoon and denying nausea.  Thank you    1600 - Dr. Fowler advises to reach out to surgery team.      9270:  This RN called surgery # for Dr. Miller - answering service sending page and took floor phone number for return call.  Thank you.    7656 - spoke with MD on call from general surgery.  Explained situation, asked if pt diet could be advanced.  MD agreed and placed pt on regular diet.  Thank you

## 2022-09-22 NOTE — PROGRESS NOTES
Care Management Discharge Note    Discharge Date: 09/23/2022       Discharge Disposition:      Discharge Services:      Discharge DME:      Discharge Transportation:      Private pay costs discussed: Not applicable    PAS Confirmation Code:    Patient/family educated on Medicare website which has current facility and service quality ratings:      Education Provided on the Discharge Plan:    Persons Notified of Discharge Plans:   Patient/Family in Agreement with the Plan:      Handoff Referral Completed: No    Additional Information:  Pt identified as a Service Bundle #3. No needs or assessment needed at this time. Please consult CM/SW  if discharge needs should arise.    RAON Dave, [facility]  Inpatient Care Coordination  Ortho/Spine Unit  [phone]  Anyi Mccoy, BILL

## 2022-09-23 VITALS
BODY MASS INDEX: 19.91 KG/M2 | WEIGHT: 116.6 LBS | OXYGEN SATURATION: 96 % | HEIGHT: 64 IN | DIASTOLIC BLOOD PRESSURE: 57 MMHG | SYSTOLIC BLOOD PRESSURE: 141 MMHG | TEMPERATURE: 97.5 F | RESPIRATION RATE: 20 BRPM | HEART RATE: 50 BPM

## 2022-09-23 LAB
ANION GAP SERPL CALCULATED.3IONS-SCNC: 7 MMOL/L (ref 7–15)
APPEARANCE STONE: NORMAL
BUN SERPL-MCNC: 6.1 MG/DL (ref 8–23)
CALCIUM SERPL-MCNC: 9.2 MG/DL (ref 8.6–10)
CHLORIDE SERPL-SCNC: 105 MMOL/L (ref 98–107)
COMPN STONE: NORMAL
CREAT SERPL-MCNC: 0.66 MG/DL (ref 0.51–0.95)
DEPRECATED HCO3 PLAS-SCNC: 29 MMOL/L (ref 22–29)
ERYTHROCYTE [DISTWIDTH] IN BLOOD BY AUTOMATED COUNT: 12.8 % (ref 10–15)
GFR SERPL CREATININE-BSD FRML MDRD: >90 ML/MIN/1.73M2
GLUCOSE BLDC GLUCOMTR-MCNC: 157 MG/DL (ref 70–99)
GLUCOSE BLDC GLUCOMTR-MCNC: 192 MG/DL (ref 70–99)
GLUCOSE BLDC GLUCOMTR-MCNC: 216 MG/DL (ref 70–99)
GLUCOSE BLDC GLUCOMTR-MCNC: 71 MG/DL (ref 70–99)
GLUCOSE SERPL-MCNC: 171 MG/DL (ref 70–99)
HCT VFR BLD AUTO: 35.7 % (ref 35–47)
HGB BLD-MCNC: 11.1 G/DL (ref 11.7–15.7)
MCH RBC QN AUTO: 29.6 PG (ref 26.5–33)
MCHC RBC AUTO-ENTMCNC: 31.1 G/DL (ref 31.5–36.5)
MCV RBC AUTO: 95 FL (ref 78–100)
PLATELET # BLD AUTO: 374 10E3/UL (ref 150–450)
POTASSIUM SERPL-SCNC: 4.3 MMOL/L (ref 3.4–5.3)
RBC # BLD AUTO: 3.75 10E6/UL (ref 3.8–5.2)
SODIUM SERPL-SCNC: 141 MMOL/L (ref 136–145)
SPECIMEN WT: 77 MG
WBC # BLD AUTO: 7.6 10E3/UL (ref 4–11)

## 2022-09-23 PROCEDURE — 36415 COLL VENOUS BLD VENIPUNCTURE: CPT | Performed by: INTERNAL MEDICINE

## 2022-09-23 PROCEDURE — 250N000013 HC RX MED GY IP 250 OP 250 PS 637: Performed by: INTERNAL MEDICINE

## 2022-09-23 PROCEDURE — 82310 ASSAY OF CALCIUM: CPT | Performed by: INTERNAL MEDICINE

## 2022-09-23 PROCEDURE — 99231 SBSQ HOSP IP/OBS SF/LOW 25: CPT | Performed by: PHYSICIAN ASSISTANT

## 2022-09-23 PROCEDURE — 85027 COMPLETE CBC AUTOMATED: CPT | Performed by: INTERNAL MEDICINE

## 2022-09-23 PROCEDURE — 250N000011 HC RX IP 250 OP 636: Performed by: STUDENT IN AN ORGANIZED HEALTH CARE EDUCATION/TRAINING PROGRAM

## 2022-09-23 PROCEDURE — 250N000013 HC RX MED GY IP 250 OP 250 PS 637: Performed by: PHYSICIAN ASSISTANT

## 2022-09-23 PROCEDURE — 99239 HOSP IP/OBS DSCHRG MGMT >30: CPT | Performed by: INTERNAL MEDICINE

## 2022-09-23 PROCEDURE — C9113 INJ PANTOPRAZOLE SODIUM, VIA: HCPCS | Performed by: STUDENT IN AN ORGANIZED HEALTH CARE EDUCATION/TRAINING PROGRAM

## 2022-09-23 RX ORDER — CEFUROXIME AXETIL 500 MG/1
500 TABLET ORAL 2 TIMES DAILY
Qty: 14 TABLET | Refills: 0 | Status: SHIPPED | OUTPATIENT
Start: 2022-09-23 | End: 2022-09-30

## 2022-09-23 RX ORDER — TAMSULOSIN HYDROCHLORIDE 0.4 MG/1
0.4 CAPSULE ORAL DAILY
Qty: 30 CAPSULE | Refills: 0 | Status: SHIPPED | OUTPATIENT
Start: 2022-09-23 | End: 2022-10-10

## 2022-09-23 RX ORDER — FLUCONAZOLE 50 MG/1
50 TABLET ORAL DAILY
Qty: 10 TABLET | Refills: 0 | Status: SHIPPED | OUTPATIENT
Start: 2022-09-23 | End: 2022-10-10

## 2022-09-23 RX ADMIN — INSULIN ASPART 2 UNITS: 100 INJECTION, SOLUTION INTRAVENOUS; SUBCUTANEOUS at 09:26

## 2022-09-23 RX ADMIN — TAMSULOSIN HYDROCHLORIDE 0.4 MG: 0.4 CAPSULE ORAL at 09:25

## 2022-09-23 RX ADMIN — INSULIN ASPART 1 UNITS: 100 INJECTION, SOLUTION INTRAVENOUS; SUBCUTANEOUS at 05:31

## 2022-09-23 RX ADMIN — PANTOPRAZOLE SODIUM 40 MG: 40 INJECTION, POWDER, FOR SOLUTION INTRAVENOUS at 09:25

## 2022-09-23 RX ADMIN — HYDROMORPHONE HYDROCHLORIDE 2 MG: 2 TABLET ORAL at 05:38

## 2022-09-23 ASSESSMENT — ACTIVITIES OF DAILY LIVING (ADL)
ADLS_ACUITY_SCORE: 24

## 2022-09-23 NOTE — PLAN OF CARE
Goal Outcome Evaluation:    Reviewed discharge instructions and medications with patient and spouse. Questions answered. Patient discharged to home with  driving, discharge instructions, medications to be picked-up at her [facility], and belongings.

## 2022-09-23 NOTE — PLAN OF CARE
Goal Outcome Evaluation:    Vss. Afebrile. Room air mid 90s. Hypoactive bs/+gas, no nausea. Abdomen little rounded/tender, but soft. Tolerating diet. Voiding. Ivf infusing. Pain managed with oral Dilaudid. [id number]. Rocephin for antibiotic therapy. Independent. No other significant issues noted this evening.

## 2022-09-23 NOTE — PROGRESS NOTES
[facility]    General Surgery Progress Note          Assessment and Plan:   Assessment:    Lynn Thompson is a 57 year old female with extensive abdominal surgical history and previous bowel obstructions in the past, who presents with nausea, vomiting and pain radiating to the left back.  She was found to have possible partial bowel obstruction on CT and left ureteral calculus.       - s/p Cystoscopy with left retrograde pyelogram, left ureteroscopy, thulium laser lithotripsy of left ureteral calculus, stone basketing and left ureteral stent insertion   - gastrografin challenge 9/20 shows contrast in colon         Plan:   Bowel: senna-s,   Diet: regular  IVFs: D5 1/2 NS w/ K @ 100 mL/hr  Activity: encourage ambulation 3-4 x daily  Antibiotics: Rocephin per Urology  DVT prophylaxis: ambulation   Dispo: ok to discharge from surgical perspective, no surgical follow up needed             Interval History:   Pt resting in bed. No complaints.  Denies abdominal pain.  Tolerating regular diet with no nausea.  Passing gas.  Had 2 BM's after suppository yesterday.          Physical Exam:   Temp: 97.1  F (36.2  C) Temp src: Temporal BP: 124/55 Pulse: (!) 49   Resp: 16   SpO2: 98 % O2 Device: None (Room air)        I/O last 3 completed shifts:  In: 6261 [P.O.:1118; I.V.:5143]  Out: -     Constitutional: alert and no distress   Abdomen: soft, non-tender.             Data:   Data   Recent Labs   Lab 09/23/22  0656 09/23/22  0503 09/23/22  0109 09/21/22  1306 09/21/22  0829 09/21/22  0752 09/20/22  1304 09/20/22  1010 09/20/22  0057 09/19/22  1538   WBC 7.6  --   --   --   --  7.4  --  6.8  --  12.4*   HGB 11.1*  --   --   --   --  10.2*  --  11.0*  --  13.0   MCV 95  --   --   --   --  97  --  97  --  96     --   --   --   --  336  --  378  --  422     --   --   --  140  --   --  140  --  141   POTASSIUM 4.3  --   --   --  4.8  --   --  4.9  --  4.5   CHLORIDE 105  --   --   --  107  --   --   107  --  102   CO2 29  --   --   --  28  --   --  25  --  28   BUN 6.1*  --   --   --  12.9  --   --  18.7  --  24.3*   CR 0.66  --   --   --  0.72  --   --  0.67  --  0.80   ANIONGAP 7  --   --   --  5*  --   --  8  --  11   VALARIE 9.2  --   --   --  8.6  --   --  8.8  --  9.8   * 157* 192*   < > 182*  --    < > 172*   < > 131*   ALBUMIN  --   --   --   --   --   --   --   --   --  4.2   PROTTOTAL  --   --   --   --   --   --   --   --   --  7.1   BILITOTAL  --   --   --   --   --   --   --   --   --  <0.2   ALKPHOS  --   --   --   --   --   --   --   --   --  151*   ALT  --   --   --   --   --   --   --   --   --  56*   AST  --   --   --   --   --   --   --   --   --  44*    < > = values in this interval not displayed.        Kandis Carson PA-C

## 2022-09-23 NOTE — PLAN OF CARE
Goal Outcome Evaluation:           Pt is A&Ox4. Marcin to make needs known. LS CTA. No SOB and cough. Abdomen slightly tender. BS active X4. Last BM was 9/20.pt is I in room. No c/o dizziness and Last Hgb was 8.2. will continue monitoring.Possible Discharge  in 1 or 2 days after Hgb is stable.

## 2022-09-25 LAB
BACTERIA BLD CULT: NO GROWTH
BACTERIA BLD CULT: NO GROWTH

## 2022-09-26 NOTE — DISCHARGE SUMMARY
Physician Discharge Summary           [facility]  Hospitalist Discharge Summary-[facility]    Name: Lynn Thompson    MRN: [id number]     YOB: 1965    Age: 57 year old                                                     Primary care provider: Maryana Brooks    Admit date:  2022    Discharge date and time: 2022 11:37 AM    Discharge Physician: Andrew Fowler M.D., M.B.A.       Primary Discharge Diagnosis      Ureteral stone, left proximal   Hx of ureteral stones and stent placement   Possible partial small bowel obstruction/ileus    Secondary Diagnosis /chronic medical conditions     Past Medical History:   Diagnosis Date     Benign paroxysmal positional vertigo     occ.      Calculus of kidney 05/2005    x1 on L side passed, several stones.  Has been tested for oxalate.     Chronic abdominal pain 2013     Chronic pancreatitis 2013     Depression     also occ panic spells     Diabetes (H) 5/10/2013    Total Pancreatomy with Auto Islets Transplant     Dyspepsia 1999    H. pylori   treated     Headaches     still periodic HA's ;  often 5X/week     Hypertension 2016    Stress related     Iron deficiency anemia 2003    relates to gastric bypass     Post-pancreatectomy diabetes melltius 2013     Sleep apnea     uses splint     Spasm of sphincter of Oddi     surgical + endoscopic stenting of pancreatic duct @ [facility] [place] 06     Thyroid nodule 2016     Past Surgical History:  Past Surgical History:   Procedure Laterality Date     ABDOMINOPLASTY  2002    Tummy tuck     APPENDECTOMY  1990     BUNIONECTOMY Right 1998     CBD Stent placement  2002    CBD stent; Dr. Presley      SECTION       CHOLECYSTECTOMY       COLONOSCOPY N/A 2021    Procedure: COLONOSCOPY INCOMPLETE Aborted due to incomplete prep  will need to take additional prep and return tomorrow 21;  Surgeon: Ihsan Saenz MD;  Location:  GI      COMBINED CYSTOSCOPY, RETROGRADES, URETEROSCOPY, LASER HOLMIUM LITHOTRIPSY URETER(S), INSERT STENT Right 03/23/2015    Procedure: COMBINED CYSTOSCOPY, RETROGRADES, URETEROSCOPY, LASER HOLMIUM LITHOTRIPSY URETER(S), INSERT STENT;  Surgeon: Kennedi Aldana MD;  Location: [facility] OR     COMBINED CYSTOSCOPY, RETROGRADES, URETEROSCOPY, LASER HOLMIUM LITHOTRIPSY URETER(S), INSERT STENT Right 04/20/2015    Procedure: COMBINED CYSTOSCOPY, RETROGRADES, URETEROSCOPY, LASER HOLMIUM LITHOTRIPSY URETER(S), INSERT STENT;  Surgeon: Kennedi Aldana MD;  Location: [facility] OR     COSMETIC SURGERY  6/24/2002    Tummy tuck     CYSTECTOMY OVARIAN BENIGN Right      CYSTOSCOPY, RETROGRADES, INSERT STENT URETER(S), COMBINED  10/02/2012    Procedure: COMBINED CYSTOSCOPY, RETROGRADES, INSERT STENT URETER(S);  COMBINED CYSTOSCOPY,  , INSERT LEFT STENT URETER;  Surgeon: Johny Baez MD;  Location: [facility] OR     CYSTOSCOPY, RETROGRADES, INSERT STENT URETER(S), COMBINED Left 9/20/2022    Procedure: Cystoscopy with left retrograde pyelogram, left ureteroscopy, thulium laser lithotripsy of left ureteral calculus, stone basketing and left ureteral stent insertion;  Surgeon: Alonzo Rome MD;  Location: [facility] OR     ESOPHAGOSCOPY, GASTROSCOPY, DUODENOSCOPY (EGD), COMBINED N/A 01/24/2018    Procedure: COMBINED ESOPHAGOSCOPY, GASTROSCOPY, DUODENOSCOPY (EGD);  ESOPHAGOSCOPY, GASTROSCOPY, DUODENOSCOPY (EGD)    ;  Surgeon: Tamir Rodgers MD;  Location: [facility] GI     EXTRACORPOREAL SHOCK WAVE LITHOTRIPSY (ESWL)  10/16/2012    Procedure: EXTRACORPOREAL SHOCK WAVE LITHOTRIPSY (ESWL);  left EXTRACORPOREAL SHOCK WAVE LITHOTRIPSY (ESWL) ;  Surgeon: Johny Baez MD;  Location: [facility] OR     Gastric bypass NOS       HERNIA REPAIR  02/2015     HYSTERECTOMY SUPRACERVICAL, BILATERAL SALPINGO-OOPHORECTOMY, COMBINED N/A 02/01/2022    Procedure: Abdominal supracervical hysterectomy, bilateral salpingooophrectomy;  Surgeon: Nicole Rivera MD;   [facility]     IRRIGATION AND DEBRIDEMENT HAND, COMBINED Left 10/30/2020    Procedure: Left hand sharp excisional debridement of skin, subcutaneous tissue and fat with a scalpel, 2 x 1 x 1 cm.;  Surgeon: Demian Renteria MD;  Location: [facility] OR     LAPAROSCOPIC LYSIS ADHESIONS N/A 02/20/2015    Procedure: LAPAROSCOPIC LYSIS ADHESIONS;  Surgeon: Aaron Early MD;  [facility]     LAPAROSCOPIC LYSIS ADHESIONS N/A 12/29/2015    Procedure: LAPAROSCOPIC LYSIS ADHESIONS;  Surgeon: Aaron Early MD;  [facility]     PANCREATECTOMY, TRANSPLANT AUTO ISLET CELL, COMBINED  05/10/2013    Procedure: COMBINED PANCREATECTOMY, TRANSPLANT AUTO ISLET CELL;  Pancreatectomy, Auto Islet Cell Transplant   hernia repair, jejunostomy tube and liver biopsies with Anesthesia General with block;  Surgeon: Aaron Early MD;  [facility]     Partial ileum resection  1992     RECTOPEXY ABDOMINAL N/A 02/01/2022    Procedure: RECTOPEXY, ABDOMINAL;  Surgeon: Uriah Sheridan MD;  [facility]     REPAIR PTOSIS BROW BILATERAL Bilateral 06/09/2020    Procedure: BILATERAL BROW PTOSIS REPAIR;  Surgeon: Denise Alberts MD;  Location: [facility] OR     SACROCOLPOPEXY, CYSTOSCOPY, COMBINED N/A 02/01/2022    Procedure: Uterosacral ligament suspension, pina colposuspension with Cystoscopy;  Surgeon: Nicole Rivera MD;  [facility]     SIGMOIDOSCOPY FLEXIBLE N/A 02/01/2022    Procedure: SIGMOIDOSCOPY, FLEXIBLE;  Surgeon: Uriah Sheridan MD;  [facility]     Surgery for SBO  2015     TONSILLECTOMY, ADENOIDECTOMY, COMBINED  1997     TRANSPLANT  5/10/13    Pancreatic Auto-Islet Transplant           Brief Summary of Hospital stay :       Please refer to  Admission H&P note  and subsequent progress notes in EMR for full details of patient care.    Reason for Hospitalization(C/C,HPI and brief patient summary):Abdominal pain      Significant findings(Primary diagnosis )Procedures and treatments  provided(Hospital course ,consults, procedures):Please see below for details    Ms. Lynn Thompson is a 57 year old female with a history of gastric bypass surgery, chronic idiopathic pancreatitis with pancreatectomy and islet cell transplant, 3 or 4 small bowel obstructions and 2 lysis of adhesion surgeries, multiple episodes of kidney stones with previous lithotripsy, hypertension, depression, diabetes mellitus, struct of sleep apnea, hysterectomy and bilateral salpingooophorectomy, previous, bile duct stent admitted on 9/19/2022 with acute onset flank pain, found to have ureteral stone. S/p Cystoscopy, left ureteroscopy with thulium fiber laser lithotripsy, left ureteroscopy with stone basketing, left retrograde pyelogram, left ureteral stent placement 9/20.     Problem list (medical problems addressed during hospital stay):    Ureteral stone, left proximal   Hx of ureteral stones and stent placement   She has a very complex past medical history and presents with abdominal pain.  She reports feeling mildly unwell over the weekend.  At 4 AM this morning she developed diffuse abdominal pain and also some vomiting.  She describes it as sharp and radiating to her back.  She also has some abdominal distention.  She did have a bowel movement today.  She has had no difficulty urinating.  She has had several abdominal surgeries in the past and has had 3 or 4 small bowel obstructions as well as lysis of adhesions.  She is also had several kidney stones in the past and required lithotripsies.   Urinalysis showed 38 white cells.  CT of the abdomen and pelvis showed multiple previous surgical changes, a 9 x 4 mm left proximal ureteral stone and mildly dilated loops of small bowel as well as large amount of stool in the colon.   S/p Cystoscopy, left ureteroscopy with thulium fiber laser lithotripsy, left ureteroscopy with stone basketing, left retrograde pyelogram, left ureteral stent placement 9/20. General surgery  "following for ileus as below   -- Urology consulted and  Followed patient                 -Continue with with indwelling ureteral stent.  Urology will set up to have this removed in our office in several weeks.                -Continued  with IV antibiotics. Cultures followed .. Proteus mirabilis grew  and  Transitioned  to oral and will treat for complicated UTI on discharge                 -Possible side effects with an indwelling ureteral stent such as urgency and frequency of urination, dysuria, hematuria, symptoms of urine reflux, and some achiness in the side. Indwelling ureteral stents need to be exchanged every three months or removed by three months.                 -Flomax 0.4 mg once daily                -Would recommend metabolic evaluation after this acute episode given strong family history of nephrolithiasis as well as recurrent nephrolithiasis  -- Pain medications PRN   -- Remains on ceftriaxone      Possible partial small bowel obstruction/ileus                Probable reactive ileus   CT showed mildly dilated loops of small bowel.  Unclear if this is an acute or more chronic finding. Her presenting symptoms could be secondary to ureteral stone and or partial small bowel obstruction.   -- General surgery consulted and has been  following                 - treated with  IV dilaudid, IV Toradol, Oxycodone, Tylenol prn                 - Bowel: senna-s                - Diet: ADAT per Surgery                     Diabetes mellitus  She has been on low-dose Lantus insulin recently despite islet cell transplant.        Sleep apnea  -- Resumed PTA CPAP           Consultations during hospital stay:       SURGERY GENERAL IP CONSULT  UROLOGY IP CONSULT      Patient discharge Condition:     stable    BP (!) 141/57 (BP Location: Right arm)   Pulse 50   Temp 97.5  F (36.4  C) (Temporal)   Resp 20   Ht 1.626 m (5' 4\")   Wt 52.9 kg (116 lb 9.6 oz)   LMP 12/19/2013   SpO2 96%   BMI 20.01 kg/m   "       Discharge Instructions:       Patient/family instructions: Written discharge instruction given to patient/family    Discharge Medications:       Review of your medicines      START taking      Dose / Directions   cefuroxime 500 MG tablet  Commonly known as: CEFTIN  Used for: Ureteral stone      Dose: 500 mg  Take 1 tablet (500 mg) by mouth 2 times daily for 7 days  Quantity: 14 tablet  Refills: 0     fluconazole 50 MG tablet  Commonly known as: DIFLUCAN  Used for: Ureteral stone      Dose: 50 mg  Take 1 tablet (50 mg) by mouth daily  Quantity: 10 tablet  Refills: 0     tamsulosin 0.4 MG capsule  Commonly known as: FLOMAX  Used for: Ureteral stone      Dose: 0.4 mg  Take 1 capsule (0.4 mg) by mouth daily  Quantity: 30 capsule  Refills: 0        CONTINUE these medicines which may have CHANGED, or have new prescriptions. If we are uncertain of the size of tablets/capsules you have at home, strength may be listed as something that might have changed.      Dose / Directions   estradiol 0.1 MG/GM vaginal cream  Commonly known as: ESTRACE  This may have changed: additional instructions  Used for: Vaginal atrophy  Notes to patient: Resume per home schedule      PLACE 2 GRAMS VAGINALLY 2 TIMES WEEKLY  Quantity: 42.5 g  Refills: 1     Fiasp PenFill 100 UNIT/ML Soct  This may have changed: when to take this  Used for: Post-pancreatectomy diabetes (H)  Generic drug: Insulin Aspart (w/Niacinamide)      Dose: 0.5-4 Units  Inject 0.5-4 Units Subcutaneous 4 times daily (with meals and nightly)  Quantity: 15 mL  Refills: 5     insulin glargine 100 UNIT/ML pen  Commonly known as: LANTUS PEN  This may have changed: additional instructions  Used for: Post-pancreatectomy diabetes (H)      Dose: 5 Units  Inject 5 Units Subcutaneous At Bedtime Inject 6 units daily  Quantity: 15 mL  Refills: 11        CONTINUE these medicines which have NOT CHANGED      Dose / Directions   calcium carbonate 600 mg-vitamin D 400 units 600-400 MG-UNIT  per tablet  Commonly known as: CALTRATE  Notes to patient: Resume per home schedule      Dose: 1 tablet  Take 1 tablet by mouth daily  Refills: 0     DULoxetine 20 MG capsule  Commonly known as: CYMBALTA  Notes to patient: Resume per home schedule      Dose: 20 mg  Take 20 mg by mouth daily  Refills: 0     escitalopram 20 MG tablet  Commonly known as: LEXAPRO  Notes to patient: Resume per home schedule      Dose: 20 mg  Take 20 mg by mouth daily  Refills: 0     famotidine 40 MG tablet  Commonly known as: PEPCID  Used for: Gastroesophageal reflux disease with esophagitis      Dose: 40 mg  Take 1 tablet (40 mg) by mouth 2 times daily as needed for heartburn  Quantity: 180 tablet  Refills: 1     gabapentin 300 MG capsule  Commonly known as: NEURONTIN      Dose: 300 mg  Take 300 mg by mouth nightly as needed  Refills: 0     glucose 40 % (400 mg/mL) gel  Used for: Chronic pancreatitis (H)      Dose: 15-30 g  Take 15-30 g by mouth every 15 minutes as needed.  Quantity: 1 Tube  Refills: 11     Gvoke HypoPen 1-Pack 1 MG/0.2ML Soaj  Used for: Post-pancreatectomy diabetes (H)  Generic drug: Glucagon      Dose: 1 mg  Inject 1 mg Subcutaneous once as needed (for hypoglycemia with loss of consciousness)  Quantity: 15 mL  Refills: 5     hydrOXYzine 25 MG tablet  Commonly known as: ATARAX      TAKE 1-2 TABLETS BY MOUTH 2 TIMES DAILY AS NEEDED FOR ANXIETY  Refills: 0     insulin pen needle 32G X 4 MM miscellaneous  Commonly known as: 32G X 4 MM  Used for: Post-pancreatectomy diabetes (H)      Use 4-6 pen needles daily to inject subcutaneous insulin  Quantity: 200 each  Refills: 11     levothyroxine 100 MCG tablet  Commonly known as: SYNTHROID/LEVOTHROID  Used for: Acquired hypothyroidism      Dose: 100 mcg  Take 1 tablet (100 mcg) by mouth daily  Quantity: 90 tablet  Refills: 0     lipase-protease-amylase 10285-83173 units Tabs tablet  Commonly known as: Viokace  Used for: Pancreatic insufficiency  Notes to patient: Resume per home  schedule      5 with meals and 3 with snacks.  Up to 20 per day supply.  Quantity: 500 tablet  Refills: 11     loratadine 10 MG tablet  Commonly known as: CLARITIN  Indication: Hayfever      Dose: 10 mg  Take 10 mg by mouth daily as needed  Refills: 0     modafinil 200 MG tablet  Commonly known as: PROVIGIL  Notes to patient: Resume per home schedule      Dose: 400 mg  Take 400 mg by mouth daily  Refills: 0     omeprazole 40 MG DR capsule  Commonly known as: priLOSEC  Used for: Gastroesophageal reflux disease without esophagitis      Dose: 40 mg  Take 1 capsule (40 mg) by mouth 2 times daily Take 30-60 minutes before a meal.  Quantity: 180 capsule  Refills: 3     traZODone 50 MG tablet  Commonly known as: DESYREL      Dose: 50 mg  Take 50 mg by mouth nightly as needed for sleep  Refills: 0           Where to get your medicines      These medications were sent to [id number] - [place], [place] - [address]  [id number], [facility] [address]    Phone: [phone]     cefuroxime 500 MG tablet    fluconazole 50 MG tablet    tamsulosin 0.4 MG capsule          Discharge diet:Orders Placed This Encounter      Diet    diabetic diet      Discharge activity:Activity as tolerated      Discharge follow-up:    Follow up with primary care provider in 7 days or earlier if symptoms return or gets worse.    Follow up with consultant as instructed  with urology       Other instructions:    We discussed with patient/family about detail discharge instructions as well as discharge medications above including potential risks,side effects and benefits.Patient/family understood benefits and potential serious side effects of taking these medications and need to follow up with PCP if the patient develops complications.  Patient is also advised to see a doctor immediately for severe symptoms.        Major procedure performed/  Significant Diagnostic Studies:           Results for orders placed or performed during the hospital  encounter of 09/19/22   CT Abdomen Pelvis w Contrast    Narrative    EXAM: CT ABDOMEN PELVIS W CONTRAST  LOCATION: [facility]  DATE/TIME: 9/19/2022 5:27 PM    INDICATION: Abdominal pain.  COMPARISON: CT abdomen pelvis 11/23/2021.  TECHNIQUE: CT scan of the abdomen and pelvis was performed following injection of IV contrast. Multiplanar reformats were obtained. Dose reduction techniques were used.  CONTRAST: 56mL Isovue 370    FINDINGS:   LOWER CHEST: Normal.    HEPATOBILIARY: No focal liver lesions. Prior cholecystectomy with a hepaticojejunostomy. Pneumobilia is unchanged. No biliary ductal dilation.    PANCREAS: Prior pancreatectomy.    SPLEEN: Prior splenectomy.    ADRENAL GLANDS: Normal.    KIDNEYS/BLADDER: Nonobstructing right renal calculi measuring up to 4 mm at the lower pole. No right hydronephrosis. 9 x 4 mm calculus within the proximal left ureter (4/#42), resulting in mild pelviectasis, but no significant caliectasis. Mild cortical   scarring at the upper pole of the left kidney is unchanged. Symmetric renal enhancement. Urinary bladder is decompressed.    BOWEL: Status post gastric bypass and ileocecectomy. Mildly dilated hepatobiliary loops of small bowel are present in the left upper quadrant. Alimentary small bowel loops and proximal common channel are decompressed, however there are multiple mildly   dilated loops of small bowel within the central abdomen, some of which contain fecalized enteric contents. No discrete transition point is identified. Distal ileal loops are normal in caliber, and the ileocecal anastomosis is patent. Large volume stool   is present throughout the colon.    LYMPH NODES: No enlarged lymph nodes.    VASCULATURE: Patent portal and superior mesenteric veins. No mesenteric swirling. Nonaneurysmal abdominal aorta.    PELVIC ORGANS: Uterus is absent.    MUSCULOSKELETAL: Nonobstructive bowel gas pattern. No aggressive bone lesions.      Impression     IMPRESSION:     1.  Partially obstructing 9 x 4 mm calculus within the proximal left ureter, resulting in mild pelviectasis but no significant caliectasis.    2.  Status post gastric bypass, with a probable early or partial mechanical small bowel obstruction. There are multiple mildly dilated loops of small bowel in the midabdomen, some of which contain fecalized contents. Hepatobiliary loops in the left upper   quadrant are also mildly dilated. No discrete transition point is identified. No signs of bowel compromise.    3.  Large volume stool throughout the colon.    4.  Nonobstructing right renal calculi measuring up to 4 mm.   XR Gastrografin  Challenge    Narrative    XR GASTROGRAFIN CHALLENGE   9/20/2022 5:26 PM     HISTORY: possible ileus vs PSBO    COMPARISON: CT 9/19/2022      Impression    IMPRESSION: Large amount of formed stool throughout the colon without  pathologically distended loops of intestine. Administered oral  contrast reached the colon by 8 hours. Interval placement of left  ureteral stent. Visualized lung bases are clear. No acute bony  abnormality.    FRANCIS LINDSEY MD         SYSTEM ID:  [id number]   XR Surgery HEIDI L/T 5 Min Fluoro w Stills    Narrative    This exam was marked as non-reportable because it will not be read by a   radiologist or a [place] non-radiologist provider.         XR Abdomen 2 Views    Narrative    ABDOMEN TWO-THREE VIEW  9/21/2022 10:09 AM     HISTORY: SBO, follow up after gastrografin challenge.    COMPARISON: September 20, 2022      Impression    IMPRESSION: Contrast present throughout the colon. Left ureteral stent  in place. No definite dilated bowel demonstrated.    DEBBY CUNHA MD         SYSTEM ID:  [id number]     *Note: Due to a large number of results and/or encounters for the requested time period, some results have not been displayed. A complete set of results can be found in Results Review.       Recent Labs   Lab 09/23/22  0656 09/21/22  7109  09/20/22  1010   WBC 7.6 7.4 6.8   HGB 11.1* 10.2* 11.0*   HCT 35.7 33.4* 35.7   MCV 95 97 97    336 378     No results for input(s): CULT in the last 168 hours.  Recent Labs   Lab 09/23/22  0909 09/23/22  0656 09/23/22  0503 09/21/22  1306 09/21/22  0829 09/20/22  1304 09/20/22  1010   NA  --  141  --   --  140  --  140   POTASSIUM  --  4.3  --   --  4.8  --  4.9   CHLORIDE  --  105  --   --  107  --  107   CO2  --  29  --   --  28  --  25   ANIONGAP  --  7  --   --  5*  --  8   * 171* 157*   < > 182*   < > 172*   BUN  --  6.1*  --   --  12.9  --  18.7   CR  --  0.66  --   --  0.72  --  0.67   GFRESTIMATED  --  >90  --   --  >90  --  >90   VALARIE  --  9.2  --   --  8.6  --  8.8    < > = values in this interval not displayed.       Recent Labs   Lab 09/23/22  0909 09/23/22  0656 09/23/22  0503 09/23/22  0109 09/23/22  0039   * 171* 157* 192* 71         Pending Results:       Unresulted Labs Ordered in the Past 30 Days of this Admission     No orders found from 8/20/2022 to 9/20/2022.             Patient Allergies:       Allergies   Allergen Reactions     Corticosteroids Other (See Comments)     All oral, IV and injectable steroids are contraindicated (unless in life threatening situations) in Islet Auto transplant recipients. They can cause irreversible loss of islet cell function. Please contact patient's transplant care coordinator, Erlinda Multani RN BSN at [phone]/pager: [phone] and endocrinologist prior to administration.       Povidone Iodine Hives     Causes skin to blister     Nsaids      naprosyn = GI upset     Sulfasalazine Nausea and Nausea and Vomiting         Disposition:     Disposition: home    I saw and evaluated the patient on day of discharge and  discharge instructions reviewed  and  all the patient's questions and concerns addressed. Over 30 minutes spent on discharge and coordination of discharge process for this patient.      Disclaimer: This note consists of  symbols derived from keyboarding, dictation and/or voice recognition software. As a result, there may be errors in the script that have gone undetected. Please consider this when interpreting information found in this chart

## 2022-09-28 ENCOUNTER — TRANSFERRED RECORDS (OUTPATIENT)
Dept: HEALTH INFORMATION MANAGEMENT | Facility: CLINIC | Age: 57
End: 2022-09-28

## 2022-10-03 ENCOUNTER — TELEPHONE (OUTPATIENT)
Facility: CLINIC | Age: 57
End: 2022-10-03

## 2022-10-03 DIAGNOSIS — N32.89 BLADDER SPASMS: Primary | ICD-10-CM

## 2022-10-03 DIAGNOSIS — Z00.6 EXAM FOR CLINICAL RESEARCH: Primary | ICD-10-CM

## 2022-10-03 DIAGNOSIS — H33.103 UNSPECIFIED RETINOSCHISIS, BILATERAL: ICD-10-CM

## 2022-10-03 RX ORDER — OXYBUTYNIN CHLORIDE 5 MG/1
5 TABLET ORAL 3 TIMES DAILY PRN
Qty: 20 TABLET | Refills: 0 | Status: SHIPPED | OUTPATIENT
Start: 2022-10-03 | End: 2022-11-11

## 2022-10-03 NOTE — TELEPHONE ENCOUNTER
----- Message from Alonzo Rome MD sent at 10/3/2022  3:02 PM CDT -----  Oxybutinin 5 mg tid prn bladder spasm, 20 tablets no refills  ----- Message -----  From: Paty Romeo LPN  Sent: 10/3/2022   2:14 PM CDT  To: Alonzo Rome MD    Can I send in oxybutynin in for her she is complaining of bladder spasms.  Paty Romeo LPN

## 2022-10-04 ENCOUNTER — ALLIED HEALTH/NURSE VISIT (OUTPATIENT)
Dept: EDUCATION SERVICES | Facility: CLINIC | Age: 57
End: 2022-10-04
Attending: PEDIATRICS
Payer: MEDICARE

## 2022-10-04 DIAGNOSIS — E13.9 POST-PANCREATECTOMY DIABETES (H): ICD-10-CM

## 2022-10-04 DIAGNOSIS — E89.1 POST-PANCREATECTOMY DIABETES (H): ICD-10-CM

## 2022-10-04 DIAGNOSIS — Z90.410 POST-PANCREATECTOMY DIABETES (H): ICD-10-CM

## 2022-10-04 PROCEDURE — 99207 PR DIABETES SELF MANAGEMENT: CPT

## 2022-10-04 RX ORDER — INSULIN PMP CART,AUT,G6/7,CNTR
1 EACH SUBCUTANEOUS CONTINUOUS
Qty: 1 KIT | Refills: 0 | Status: SHIPPED | OUTPATIENT
Start: 2022-10-04 | End: 2023-09-08

## 2022-10-04 RX ORDER — INSULIN PMP CART,AUT,G6/7,CNTR
1 EACH SUBCUTANEOUS
Qty: 30 EACH | Refills: 5 | Status: SHIPPED | OUTPATIENT
Start: 2022-10-04 | End: 2023-10-30

## 2022-10-04 NOTE — PROGRESS NOTES
Diabetes Self Management Training: Pre-Insulin Pump Assessment Visit 1    Lynn Thompson presents today for Pre pump education related to S/P TPIAT.    She is accompanied by self.     Patient's diabetes management related comments/concerns: Blood sugar control (hyperglycemia)    Patient's emotional response to diabetes: expresses readiness to learn.    Patient would like this visit to be focused around the following diabetes-related behaviors and goals: Blood glucose control.    ASSESSMENT:  Patient Problem List and Family Medical History reviewed for relevant medical history, current medical status, and diabetes risk factors.    Current Diabetes Management per Patient:  Taking diabetes medications?   yes:     Diabetes Medication(s)     Diabetic Other       Glucagon (GVOKE HYPOPEN 1-PACK) 1 MG/0.2ML SOAJ    Inject 1 mg Subcutaneous once as needed (for hypoglycemia with loss of consciousness)     glucose 40 % GEL    Take 15-30 g by mouth every 15 minutes as needed.    Insulin       FIASP PENFILL 100 UNIT/ML SOCT    Inject 0.5-4 Units Subcutaneous 4 times daily (with meals and nightly)     Patient taking differently: Inject 0.5-4 Units Subcutaneous 3 times daily (with meals)     insulin glargine (LANTUS PEN) 100 UNIT/ML pen    Inject 5 Units Subcutaneous At Bedtime Inject 6 units daily     Patient taking differently: Inject 5 Units Subcutaneous At Bedtime Inject 5 units daily                Past Diabetes Education: Yes    Patient glucose self monitoring as follows: four times daily.   BG meter: Dexcom Sensor   BG results:        BG values are: Not in goal  Patient's most recent   Lab Results   Component Value Date    A1C 7.7 09/15/2022    A1C 6.8 05/10/2021    is not meeting goal of <7.0    Nutrition:  Patient currently: 2 meals per day due to lack of appetite. History of gastric bypass 2001.    Breakfast - Coffee. 1/2 yogurt, no dose   Lunch - No, cheesesticks, apple with peanut butter  Dinner - Chicken, salad,  "rice, dessert-4 units  Snacks - Apples with peanut butter    Beverages: Coffee 1 cup in AM, diet coke    Biggest Challenge to Healthy Eating: Lack of appetite    Physical Activity:    Rescue volunteer, dog walking.    Diabetes Risk Factors:  S/P TPIAT    Diabetes Complications:  Acute Complications: Symptoms of hyperglycemia? blurred vision    Vitals:  [facility] 12/19/2013   Estimated body mass index is 20.01 kg/m  as calculated from the following:    Height as of 9/20/22: 1.626 m (5' 4\").    Weight as of 9/20/22: 52.9 kg (116 lb 9.6 oz).   Last 3 BP:   BP Readings from Last 3 Encounters:   09/23/22 (!) 141/57   09/15/22 122/82   07/07/22 114/71       History   Smoking Status     Never Smoker   Smokeless Tobacco     Never Used       Labs:  Lab Results   Component Value Date    A1C 7.7 09/15/2022    A1C 6.8 05/10/2021     Lab Results   Component Value Date     09/23/2022     09/23/2022     06/14/2022     05/10/2021     Lab Results   Component Value Date     09/15/2022    LDL 84 09/17/2020     HDL Cholesterol   Date Value Ref Range Status   09/17/2020 91 >49 mg/dL Final     Direct Measure HDL   Date Value Ref Range Status   09/15/2022 97 >=50 mg/dL Final   ]  GFR Estimate   Date Value Ref Range Status   09/23/2022 >90 >60 mL/min/1.73m2 Final     Comment:     Effective December 21, 2021 eGFRcr in adults is calculated using the 2021 CKD-EPI creatinine equation which includes age and gender (Briana et al., NEJ, DOI: 10.1056/PTXLqk8175331)   05/10/2021 80 >60 mL/min/[1.73_m2] Final     Comment:     Non  GFR Calc  Starting 12/18/2018, serum creatinine based estimated GFR (eGFR) will be   calculated using the Chronic Kidney Disease Epidemiology Collaboration   (CKD-EPI) equation.       GFR, ESTIMATED POCT   Date Value Ref Range Status   11/23/2021 35 (L) >60 mL/min/1.73m2 Final     GFR Estimate If Black   Date Value Ref Range Status   05/10/2021 >90 >60 mL/min/[1.73_m2] Final     " Comment:      GFR Calc  Starting 12/18/2018, serum creatinine based estimated GFR (eGFR) will be   calculated using the Chronic Kidney Disease Epidemiology Collaboration   (CKD-EPI) equation.       Lab Results   Component Value Date    CR 0.66 09/23/2022    CR 0.81 05/10/2021     No results found for: MICROALBUMIN    Socio/Economic History:    Support system: spouse/significant other and children    Health Beliefs and Attitudes:   Patient Activation Measure Survey Score:  JENNIFER Score (Last Two) 2/6/2020 10/6/2021   JENNIFER Raw Score 31 38   Activation Score 59.3 83.7   JENNIFER Level 3 4       Stage of Change: ACTION (Actively working towards change)      Diabetes knowledge and skills assessment:     Patient is knowledgeable in diabetes management concepts related to: Insulin Pump Concepts Balancing glucose and insulin  Carbohydrate counting  Calculating boluses  Problem solving with insulin pump therapy (BG monitoring; hypoglycemia signs/symptoms, treatment (glucagon) and prevention; hyperglycemia signs/symptoms, treatment and prevention; ketones, DKA signs/symptoms and prevention)    Barriers to Learning Assessment: No Barriers identified    Based on learning assessment above, most appropriate setting for further diabetes education would be: Individual setting.    INTERVENTION:    Education provided today on:  In person visit completed for pre-pump assessment.   Basics of insulin pump therapy, including what is an insulin pump, how an insulin pump functions, advantages and disadvantages to insulin pump use, and requirements for successful insulin pump therapy, were discussed. Explained communication and treatment decisions based on Dexcom data. Informed pod changes need to occur every 3 days. Reviewed with patient pump process regarding ordering of Omnipod 5 insulin pump through pharmacy benefit ([facility]).     Opportunities for ongoing education and support in diabetes-self management were  discussed.    Patient verbalized understanding of concepts discussed and recommendations provided today.       Education Materials Provided:  Omnipod 5 brochure    PLAN:    1. Omnipod pump 5 ordered to [facility]. Pt to call CDE when pump ships so we can order vials Fiasp    2 No changes to insulin dosing today. TDD 12-15 units.  Lantus 5 units @ HS and Fiasp 0.5 units per 10 grams.  Correction .5 units for every 50 pts above 100      FOLLOW-UP:  Follow-up appointment scheduled on 11/9 at 8 AM for Omnipod 5 pump start. With Hannah Shook RN/JACKIEE      Hannah ZUNIGA, RN, CDCES  Certified Diabetes Care and   [facility]  [facility] [address]  Phone [phone]    Plan/AVS sent via My Chart.    Ongoing plan for education and support: Follow-up visit with diabetes educator in 11/9 fo pump start.    Time Spent: 60 minutes  Encounter Type: Individual    Any diabetes medication dose changes were made via the CDE Protocol and Collaborative Practice Agreement with the patient's referring provider. A copy of this encounter was shared with the provider.    *Visit today completed by Rachel Izaguirre RN, CDE and Hannah Shook RN, CDE.

## 2022-10-06 DIAGNOSIS — D64.9 ANEMIA: Primary | ICD-10-CM

## 2022-10-10 ENCOUNTER — OFFICE VISIT (OUTPATIENT)
Dept: UROLOGY | Facility: CLINIC | Age: 57
End: 2022-10-10
Payer: MEDICARE

## 2022-10-10 VITALS
HEART RATE: 60 BPM | WEIGHT: 110 LBS | BODY MASS INDEX: 18.78 KG/M2 | DIASTOLIC BLOOD PRESSURE: 78 MMHG | SYSTOLIC BLOOD PRESSURE: 120 MMHG | HEIGHT: 64 IN

## 2022-10-10 DIAGNOSIS — Z98.84 GASTRIC BYPASS STATUS FOR OBESITY: ICD-10-CM

## 2022-10-10 DIAGNOSIS — N20.0 NEPHROLITHIASIS: Primary | ICD-10-CM

## 2022-10-10 DIAGNOSIS — Z84.1 FAMILY HISTORY OF NEPHROLITHIASIS: ICD-10-CM

## 2022-10-10 DIAGNOSIS — Z79.2 PROPHYLACTIC ANTIBIOTIC: ICD-10-CM

## 2022-10-10 PROCEDURE — 52310 CYSTOSCOPY AND TREATMENT: CPT | Performed by: STUDENT IN AN ORGANIZED HEALTH CARE EDUCATION/TRAINING PROGRAM

## 2022-10-10 PROCEDURE — 99213 OFFICE O/P EST LOW 20 MIN: CPT | Mod: 25 | Performed by: STUDENT IN AN ORGANIZED HEALTH CARE EDUCATION/TRAINING PROGRAM

## 2022-10-10 RX ORDER — CIPROFLOXACIN 500 MG/1
500 TABLET, FILM COATED ORAL ONCE
Qty: 1 TABLET | Refills: 0 | Status: SHIPPED | OUTPATIENT
Start: 2022-10-10 | End: 2022-10-10

## 2022-10-10 ASSESSMENT — PAIN SCALES - GENERAL: PAINLEVEL: MILD PAIN (2)

## 2022-10-10 NOTE — NURSING NOTE

## 2022-10-10 NOTE — LETTER
10/10/2022       RE: Lynn Thompson  [id number] [facility] [id number]     Dear Colleague,    Thank you for referring your patient, Lynn Thompson, to the [facility] at [facility]. Please see a copy of my visit note below.    CHIEF COMPLAINT   Lynn Thompson who is a 57 year old female returns today for follow-up of nephrolithiasis.      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  Body mass index is 18.88 kg/m .  General Appearance Adult:   Alert, no acute distress, oriented  HENT: throat/mouth:normal, good dentition  Lungs: no respiratory distress, or pursed lip breathing  Heart: No obvious jugular venous distension present  Abdomen: nondistended  Musculoskeltal: extremities normal, no peripheral edema  Skin: no suspicious lesions or rashes  Neuro: Alert, oriented, speech and mentation normal  Psych: affect and mood normal  Gait: Normal      Stone analysis 9/20/2022  Calculi composed primarily of:   60% calcium oxalate monohydrate,   10% calcium oxalate dihydrate, and   30% calcium phosphate (hydroxy- and carbonate- apatite).      All pertinent imaging reviewed:    Ct abd/pelvis 9/19/2022            PRE-PROCEDURE DIAGNOSIS: left ureteral stone    POST-PROCEDURE DIAGNOSIS: left ureteral stone    PROCEDURE: Cystoscopy with left ureteral stent removal      DESCRIPTION OF PROCEDURE: After informed consent was obtained, the patient was  brought to the procedure room where she was placed in the lithotomy position with all pressure points well padded.  The vulva was prepped and draped in sterile fashion. A flexible cystoscope was introduced through a well-lubricated urethra. The stent was visualized, grasped with a flexible grasper and removed intact and discarded  The flexible cystoscope was removed and the findings were described to the patient.     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 Given history of gastric bypass and strong FH of nephrolithiasis as well as personal history has significant risk of recurrence    She has right sided stones which are asymptomatic currently. Discussed continued observation of these stones. She has had ESWL in the past (most recently appears to be in 2012 with Dr. Baez). The right sided stones are not very dense on CT; will get KUB to assess visibility    - return 2 months with KUB, renal ultrasound, litholink x2 prior        Alonzo Rome MD   [facility]  [phone] clinic phone

## 2022-10-10 NOTE — PROGRESS NOTES
CHIEF COMPLAINT   Lynn Thompson who is a 57 year old female returns today for follow-up of nephrolithiasis.      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  Body mass index is 18.88 kg/m .  General Appearance Adult:   Alert, no acute distress, oriented  HENT: throat/mouth:normal, good dentition  Lungs: no respiratory distress, or pursed lip breathing  Heart: No obvious jugular venous distension present  Abdomen: nondistended  Musculoskeltal: extremities normal, no peripheral edema  Skin: no suspicious lesions or rashes  Neuro: Alert, oriented, speech and mentation normal  Psych: affect and mood normal  Gait: Normal      Stone analysis 9/20/2022  Calculi composed primarily of:   60% calcium oxalate monohydrate,   10% calcium oxalate dihydrate, and   30% calcium phosphate (hydroxy- and carbonate- apatite).      All pertinent imaging reviewed:    Ct abd/pelvis 9/19/2022            PRE-PROCEDURE DIAGNOSIS: left ureteral stone    POST-PROCEDURE DIAGNOSIS: left ureteral stone    PROCEDURE: Cystoscopy with left ureteral stent removal      DESCRIPTION OF PROCEDURE: After informed consent was obtained, the patient was brought to the procedure room where she was placed in the lithotomy position with all pressure points well padded.  The vulva was prepped and draped in sterile fashion. A flexible cystoscope was introduced through a well-lubricated urethra. The stent was visualized, grasped with a flexible grasper and removed  intact and discarded  The flexible cystoscope was removed and the findings were described to the patient.     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 Given history of gastric bypass and strong FH of nephrolithiasis as well as personal history has significant risk of recurrence    She has right sided stones which are asymptomatic currently. Discussed continued observation of these stones. She has had ESWL in the past (most recently appears to be in 2012 with Dr. Baez). The right sided stones are not very dense on CT; will get KUB to assess visibility    - return 2 months with KUB, renal ultrasound, litholink x2 prior        Alonzo Rome MD   [facility]  [phone] clinic phone

## 2022-10-10 NOTE — PATIENT INSTRUCTIONS

## 2022-10-11 ENCOUNTER — TRANSFERRED RECORDS (OUTPATIENT)
Dept: FAMILY MEDICINE | Facility: CLINIC | Age: 57
End: 2022-10-11

## 2022-10-16 ENCOUNTER — HEALTH MAINTENANCE LETTER (OUTPATIENT)
Age: 57
End: 2022-10-16

## 2022-10-17 ENCOUNTER — LAB (OUTPATIENT)
Dept: ONCOLOGY | Facility: CLINIC | Age: 57
End: 2022-10-17
Attending: INTERNAL MEDICINE
Payer: COMMERCIAL

## 2022-10-17 DIAGNOSIS — D64.9 ANEMIA: ICD-10-CM

## 2022-10-17 LAB
BASOPHILS # BLD AUTO: 0.1 10E3/UL (ref 0–0.2)
BASOPHILS NFR BLD AUTO: 2 %
EOSINOPHIL # BLD AUTO: 0.2 10E3/UL (ref 0–0.7)
EOSINOPHIL NFR BLD AUTO: 3 %
ERYTHROCYTE [DISTWIDTH] IN BLOOD BY AUTOMATED COUNT: 14.6 % (ref 10–15)
FERRITIN SERPL-MCNC: 77 NG/ML (ref 11–328)
HCT VFR BLD AUTO: 38.7 % (ref 35–47)
HGB BLD-MCNC: 11.8 G/DL (ref 11.7–15.7)
IMM GRANULOCYTES # BLD: 0 10E3/UL
IMM GRANULOCYTES NFR BLD: 0 %
IRON BINDING CAPACITY (ROCHE): 268 UG/DL (ref 240–430)
IRON SATN MFR SERPL: 32 % (ref 15–46)
IRON SERPL-MCNC: 87 UG/DL (ref 37–145)
LYMPHOCYTES # BLD AUTO: 1.9 10E3/UL (ref 0.8–5.3)
LYMPHOCYTES NFR BLD AUTO: 27 %
MCH RBC QN AUTO: 29.6 PG (ref 26.5–33)
MCHC RBC AUTO-ENTMCNC: 30.5 G/DL (ref 31.5–36.5)
MCV RBC AUTO: 97 FL (ref 78–100)
MONOCYTES # BLD AUTO: 0.6 10E3/UL (ref 0–1.3)
MONOCYTES NFR BLD AUTO: 8 %
NEUTROPHILS # BLD AUTO: 4.3 10E3/UL (ref 1.6–8.3)
NEUTROPHILS NFR BLD AUTO: 60 %
NRBC # BLD AUTO: 0 10E3/UL
NRBC BLD AUTO-RTO: 0 /100
PLATELET # BLD AUTO: 409 10E3/UL (ref 150–450)
RBC # BLD AUTO: 3.99 10E6/UL (ref 3.8–5.2)
WBC # BLD AUTO: 7.1 10E3/UL (ref 4–11)

## 2022-10-17 PROCEDURE — 36415 COLL VENOUS BLD VENIPUNCTURE: CPT

## 2022-10-17 PROCEDURE — 85049 AUTOMATED PLATELET COUNT: CPT | Performed by: INTERNAL MEDICINE

## 2022-10-17 PROCEDURE — 82728 ASSAY OF FERRITIN: CPT | Performed by: INTERNAL MEDICINE

## 2022-10-17 PROCEDURE — 83550 IRON BINDING TEST: CPT | Performed by: INTERNAL MEDICINE

## 2022-10-17 NOTE — PROGRESS NOTES
Medical Assistant Note:  Lynn Thompson presents today for blood work.    Patient seen by provider today: No.   present during visit today: Not Applicable.    Concerns: No Concerns.    Procedure:  Lab draw site: rt antecub, Needle type: butterfly, Gauge: 23.    Post Assessment:  Labs drawn without difficulty: Yes.    Discharge Plan:  Departure Mode: Ambulatory.    Face to Face Time: 10 mins.    Rehana Mcnair CMA

## 2022-10-18 ENCOUNTER — IMMUNIZATION (OUTPATIENT)
Dept: FAMILY MEDICINE | Facility: CLINIC | Age: 57
End: 2022-10-18
Payer: MEDICARE

## 2022-10-18 ENCOUNTER — VIRTUAL VISIT (OUTPATIENT)
Dept: ONCOLOGY | Facility: CLINIC | Age: 57
End: 2022-10-18
Attending: INTERNAL MEDICINE
Payer: COMMERCIAL

## 2022-10-18 DIAGNOSIS — D50.9 IRON DEFICIENCY ANEMIA, UNSPECIFIED IRON DEFICIENCY ANEMIA TYPE: Primary | ICD-10-CM

## 2022-10-18 PROCEDURE — 0124A COVID-19,PF,PFIZER BOOSTER BIVALENT: CPT

## 2022-10-18 PROCEDURE — 99213 OFFICE O/P EST LOW 20 MIN: CPT | Mod: 95 | Performed by: INTERNAL MEDICINE

## 2022-10-18 PROCEDURE — 91312 COVID-19,PF,PFIZER BOOSTER BIVALENT: CPT

## 2022-10-18 PROCEDURE — G0463 HOSPITAL OUTPT CLINIC VISIT: HCPCS | Mod: PN,RTG | Performed by: INTERNAL MEDICINE

## 2022-10-18 NOTE — PROGRESS NOTES
Lynn is a 57 year old who is being evaluated via a billable video visit.  Currently in [facility].    How would you like to obtain your AVS? MyChart  If the video visit is dropped, the invitation should be resent by: Text to cell phone: [phone]  Will anyone else be joining your video visit? No     Video Start Time: 10 am   Video-Visit Details    Type of service:  Video Visit    Video End [id number] am  Originating Location (pt. 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       Lynn says that she has had anemia all of her life.  She was previously followed by Dr. Tom Malcolm at [facility] since 2004.  She was followed and treated for iron-deficiency anemia.  It was felt that it was due to poor absorption from her gastric bypass and various bowel surgeries in the past.  She was unable to tolerate oral and and could not absorb it.  She gets IV iron intermittently.  She says a round of IV iron would last her for several years.  She last saw Dr. Malcolm in 2019.      Patient has been anemic again.  She underwent colonoscopy on 4/1/21, which found some polyps that were removed.  Due to unintentional weight loss, she underwent mammogram that was negative.  She had a CT abdomen/pelvis on 3/1/21 that was normal, other than non-specific proximal colitis.  She does not smoke.      She received Injectafer x  2 doses in May 2021.      INTERIM HISTORY: Lynn feels well.  She just had surgery for her rectal prolapse about a month ago.  She says that she recovered quickly and feels very well now.        REVIEW OF SYSTEMS:   14 point ROS was reviewed and is negative other than as noted above in HPI.       HOME MEDICATIONS:  Current Outpatient Medications   Medication Sig Dispense Refill     calcium carbonate 600 mg-vitamin D 400 units (CALTRATE) 600-400 MG-UNIT per tablet Take 1 tablet by mouth daily       DULoxetine (CYMBALTA) 20 MG capsule Take 20 mg by mouth daily       escitalopram (LEXAPRO) 20 MG tablet Take 20 mg by mouth daily       famotidine (PEPCID) 40 MG tablet Take 1 tablet (40 mg) by mouth 2 times daily as needed for heartburn 180 tablet 1     FIASP PENFILL 100 UNIT/ML SOCT Inject 0.5-4 Units Subcutaneous 4 times daily (with meals and nightly) (Patient taking differently: Inject 0.5-4 Units Subcutaneous 3 times daily (with meals)) 15 mL 5     gabapentin (NEURONTIN) 300 MG capsule Take 300 mg by mouth nightly as needed       Glucagon (GVOKE HYPOPEN 1-PACK) 1 MG/0.2ML SOAJ Inject 1 mg Subcutaneous once as needed (for hypoglycemia with loss of consciousness) 15 mL 5     glucose 40 % GEL Take 15-30 g by mouth every 15 minutes as needed. 1 Tube 11     hydrOXYzine (ATARAX) 25 MG tablet TAKE 1-2 TABLETS BY MOUTH 2 TIMES DAILY AS NEEDED FOR ANXIETY  0     Insulin Disposable Pump (OMNIPOD 5 G6 INTRO, GEN 5,) KIT 1 each continuous 1 kit 0     Insulin Disposable Pump (OMNIPOD 5 G6 POD, GEN 5,) MISC 1 each every 3 days 30 each 5     insulin glargine (LANTUS PEN) 100 UNIT/ML pen Inject 5 Units Subcutaneous At Bedtime Inject 6 units daily (Patient taking differently: Inject 5 Units Subcutaneous At Bedtime Inject 5 units daily) 15 mL 11     levothyroxine (SYNTHROID/LEVOTHROID) 100 MCG tablet Take 1 tablet (100 mcg) by mouth daily 90 tablet 0     lipase-protease-amylase (VIOKACE) 83363-02359 units TABS tablet 5 with meals  and 3 with snacks.  Up to 20 per day supply. 500 tablet 11     loratadine (CLARITIN) 10 MG tablet Take 10 mg by mouth daily as needed        modafinil (PROVIGIL) 200 MG tablet Take 400 mg by mouth daily       omeprazole (PRILOSEC) 40 MG DR capsule Take 1 capsule (40 mg) by mouth 2 times daily Take 30-60 minutes before a meal. 180 capsule 3     oxybutynin (DITROPAN) 5 MG tablet Take 1 tablet (5 mg) by mouth 3 times daily as needed for bladder spasms 20 tablet 0     traZODone (DESYREL) 50 MG tablet Take 50 mg by mouth nightly as needed for sleep       estradiol (ESTRACE) 0.1 MG/GM vaginal cream PLACE 2 GRAMS VAGINALLY 2 TIMES WEEKLY (Patient not taking: No sig reported) 42.5 g 1     insulin pen needle (32G X 4 MM) 32G X 4 MM miscellaneous Use 4-6 pen needles daily to inject subcutaneous insulin 200 each 11         ALLERGIES:  Allergies   Allergen Reactions     Corticosteroids Other (See Comments)     All oral, IV and injectable steroids are contraindicated (unless in life threatening situations) in Islet Auto transplant recipients. They can cause irreversible loss of islet cell function. Please contact patient's transplant care coordinator, Erlinda Multani RN BSN at [phone]/pager: [phone] and endocrinologist prior to administration.       Povidone Iodine Hives     Causes skin to blister     Nsaids      naprosyn = GI upset     Sulfasalazine Nausea and Nausea and Vomiting         PAST MEDICAL HISTORY:  Past Medical History:   Diagnosis Date     Benign paroxysmal positional vertigo     occ.      Calculus of kidney 05/2005    x1 on L side passed, several stones.  Has been tested for oxalate.     Chronic abdominal pain 07/17/2013     Chronic pancreatitis 07/17/2013     Depression     also occ panic spells     Diabetes (H) 5/10/2013    Total Pancreatomy with Auto Islets Transplant     Dyspepsia 06/1999    H. pylori   treated     Headaches     still periodic HA's ;  often 5X/week     Hypertension 02/22/2016     Stress related     Iron deficiency anemia 2003    relates to gastric bypass     Post-pancreatectomy diabetes melltius 2013     Sleep apnea     uses splint     Spasm of sphincter of Oddi     surgical + endoscopic stenting of pancreatic duct @ [facility] 06     Thyroid nodule 2016         PAST SURGICAL HISTORY:  Past Surgical History:   Procedure Laterality Date     ABDOMINOPLASTY  2002    Tummy tuck     APPENDECTOMY  1990     BUNIONECTOMY Right 1998     CBD Stent placement  2002    CBD stent; Dr. Presley      SECTION       CHOLECYSTECTOMY       COLONOSCOPY N/A 2021    Procedure: COLONOSCOPY INCOMPLETE Aborted due to incomplete prep  will need to take additional prep and return tomorrow 21;  Surgeon: Ihsan Saenz MD;  Location: [facility]     COMBINED CYSTOSCOPY, RETROGRADES, URETEROSCOPY, LASER HOLMIUM LITHOTRIPSY URETER(S), INSERT STENT Right 2015    Procedure: COMBINED CYSTOSCOPY, RETROGRADES, URETEROSCOPY, LASER HOLMIUM LITHOTRIPSY URETER(S), INSERT STENT;  Surgeon: Kennedi Aldana MD;  Location: [facility]     COMBINED CYSTOSCOPY, RETROGRADES, URETEROSCOPY, LASER HOLMIUM LITHOTRIPSY URETER(S), INSERT STENT Right 2015    Procedure: COMBINED CYSTOSCOPY, RETROGRADES, URETEROSCOPY, LASER HOLMIUM LITHOTRIPSY URETER(S), INSERT STENT;  Surgeon: Kennedi Aldana MD;  [facility]: [facility]     COSMETIC SURGERY  2002    Tummy tuck     CYSTECTOMY OVARIAN BENIGN Right      CYSTOSCOPY, RETROGRADES, INSERT STENT URETER(S), COMBINED  10/02/2012    Procedure: COMBINED CYSTOSCOPY, RETROGRADES, INSERT STENT URETER(S);  COMBINED CYSTOSCOPY,  , INSERT LEFT STENT URETER;  Surgeon: Johny Baez MD;  Location: [facility]     CYSTOSCOPY, RETROGRADES, INSERT STENT URETER(S), COMBINED Left 2022    Procedure: Cystoscopy with left retrograde pyelogram, left ureteroscopy, thulium laser lithotripsy of left ureteral calculus, stone basketing and left ureteral stent  insertion;  Surgeon: Alonzo Rome MD;  Location: [facility]     ESOPHAGOSCOPY, GASTROSCOPY, DUODENOSCOPY (EGD), COMBINED N/A 01/24/2018    Procedure: COMBINED ESOPHAGOSCOPY, GASTROSCOPY, DUODENOSCOPY (EGD);  ESOPHAGOSCOPY, GASTROSCOPY, DUODENOSCOPY (EGD)    ;  Surgeon: Tamir Rodgers MD;  Location: [facility]     EXTRACORPOREAL SHOCK WAVE LITHOTRIPSY (ESWL)  10/16/2012    Procedure: EXTRACORPOREAL SHOCK WAVE LITHOTRIPSY (ESWL);  left EXTRACORPOREAL SHOCK WAVE LITHOTRIPSY (ESWL) ;  Surgeon: Johny Baez MD;  Location: [facility]     Gastric bypass NOS       HERNIA REPAIR  02/2015     HYSTERECTOMY SUPRACERVICAL, BILATERAL SALPINGO-OOPHORECTOMY, COMBINED N/A 02/01/2022    Procedure: Abdominal supracervical hysterectomy, bilateral salpingooophrectomy;  Surgeon: Nicole Rivera MD;  [facility]     IRRIGATION AND DEBRIDEMENT HAND, COMBINED Left 10/30/2020    Procedure: Left hand sharp excisional debridement of skin, subcutaneous tissue and fat with a scalpel, 2 x 1 x 1 cm.;  Surgeon: Demian Renteria MD;  Location: [facility]     LAPAROSCOPIC LYSIS ADHESIONS N/A 02/20/2015    Procedure: LAPAROSCOPIC LYSIS ADHESIONS;  Surgeon: Aaron Early MD;  [facility]     LAPAROSCOPIC LYSIS ADHESIONS N/A 12/29/2015    Procedure: LAPAROSCOPIC LYSIS ADHESIONS;  Surgeon: Aaron Early MD;  [facility]     PANCREATECTOMY, TRANSPLANT AUTO ISLET CELL, COMBINED  05/10/2013    Procedure: COMBINED PANCREATECTOMY, TRANSPLANT AUTO ISLET CELL;  Pancreatectomy, Auto Islet Cell Transplant   hernia repair, jejunostomy tube and liver biopsies with Anesthesia General with block;  Surgeon: Aaron Early MD;  [facility]     Partial ileum resection  1992     RECTOPEXY ABDOMINAL N/A 02/01/2022    Procedure: RECTOPEXY, ABDOMINAL;  Surgeon: Uriah Sheridan MD;  [facility]     REPAIR PTOSIS BROW BILATERAL Bilateral 06/09/2020    Procedure: BILATERAL BROW PTOSIS REPAIR;  Surgeon: Denise Alberts MD;   Location: [facility]     SACROCOLPOPEXY, CYSTOSCOPY, COMBINED N/A 2022    Procedure: Uterosacral ligament suspension, pina colposuspension with Cystoscopy;  Surgeon: Nicole Rivera MD;  [facility]     SIGMOIDOSCOPY FLEXIBLE N/A 2022    Procedure: SIGMOIDOSCOPY, FLEXIBLE;  Surgeon: Uriah Sheridan MD;  [facility]     Surgery for SBO       TONSILLECTOMY, ADENOIDECTOMY, COMBINED  1997     TRANSPLANT  5/10/13    Pancreatic Auto-Islet Transplant         SOCIAL HISTORY:  Social History     Socioeconomic History     Marital status:      Spouse name: Tera     Number of children: 2     Years of education: Not on file     Highest education level: Some college, no degree   Occupational History     Occupation: [employer] service     Employer: [employer]   Tobacco Use     Smoking status: Never     Smokeless tobacco: Never   Vaping Use     Vaping Use: Never used   Substance and Sexual Activity     Alcohol use: Not Currently     Drug use: Not Currently     Sexual activity: Yes     Partners: Male     Birth control/protection: Post-menopausal   Other Topics Concern     Parent/sibling w/ CABG, MI or angioplasty before 65F 55M? No   Social History Narrative     Not on file     Social Determinants of Health     Financial Resource Strain: Not on file   Food Insecurity: Not on file   Transportation Needs: Not on file   Physical Activity: Not on file   Stress: Not on file   Social Connections: Not on file   Intimate Partner Violence: Not At Risk     Fear of Current or Ex-Partner: No     Emotionally Abused: No     Physically Abused: No     Sexually Abused: No   Housing Stability: Not on file         FAMILY HISTORY:  Family History   Problem Relation Age of Onset     Family History Negative Mother      Respiratory Father         COPD;  at 69     Genitourinary Problems Father         kidney stones     Substance Abuse Father      Depression Father      Asthma Father      Heart Disease Paternal  Grandfather         M.I.     Coronary Artery Disease Paternal Grandfather      Hyperlipidemia Paternal Grandfather      Genitourinary Problems Brother         multiple brothers with kidney stones     Gastrointestinal Disease Maternal Grandmother         undiagnosed 'gut' issues     Coronary Artery Disease Maternal Grandfather      Hyperlipidemia Maternal Grandfather      Cerebrovascular Disease Paternal Grandmother         At the age of 103     Anxiety Disorder Paternal Grandmother      Osteoporosis Paternal Grandmother      Anxiety Disorder Son      Anxiety Disorder Daughter      Asthma Daughter      Colon Cancer No family hx of          PHYSICAL EXAM:  ECO  GENERAL/CONSTITUTIONAL: No acute distress. Healthy, alert.  RESPIRATORY: No audible wheeze, cough, or visible cyanosis.  No visible retractions or increased work of breathing.  Able to speak fully in complete sentences.  NEUROLOGIC: Alert, oriented, answers questions appropriately. No tremor. Mentation intact and speech normal  INTEGUMENTARY: No jaundice.    PSYCHIATRIC:  Mentation appears normal, affect normal/bright, judgement and insight intact, normal speech and appearance well-groomed.    The rest of a comprehensive physical exam is deferred due to public health emergency video visit restrictions.        LABS:  Labs noted and reviewed ferritin 77 iron saturation >50%  ASSESSMENT/PLAN:  Lynn Thompson is a 57 year old female with:    1) Iron-deficiency anemia: chronic, likely related to poor absorption from her history of gastric bypass and bowel surgeries.  She had a colonoscopy on 21 that showed no bleeding.  She is unable to tolerate oral iron due to side effects and poor absorption.  She does get IV iron intermittently.      She last received Injectafer in May 2021, with normalization of her hemoglobin and iron.  She felt better afterwards as well.    Her hemoglobin decreased after surgery in 2022, but has since recovered.    Recent  labs show normal hemoglobin and iron/ferritin.      -RTC in ~6 months with repeat CBC, ferritin, iron.  Patient prefers video visit. She can do labs in 3 months with chart check and SHINE visit in 6 months     2) Rectal prolapse:  Resolved    Nico Montero MD  Hematology/Oncology  [facility]    Total time spent on day of visit, including review of tests, obtaining/reviewing separately obtained history, ordering medications/tests/procedures, communicating with PCP/consultants, and documenting in electronic medical record: 10 minutes

## 2022-10-18 NOTE — LETTER
"    10/18/2022         RE: Lynn Thompson  [address] [place] [address]        Dear Colleague,    Thank you for referring your patient, Lynn Thompson, to the [facility]. Please see a copy of my visit note below.    Lynn is a 57 year old who is being evaluated via a billable video visit.      How would you like to obtain your AVS? MyChart  If the video visit is dropped, the invitation should be resent by: Text to cell phone: [phone]  Will anyone else be joining your video visit? No     Yue CHONG    {If patient encounters technical issues they should call [phone] :[id number]}      Video-Visit Details    Video Start Time: {video visit start/end time for provider to select:546331}    Type of service:  Video Visit    Video End Time:{video visit start/end time for provider to select:445459}    Originating Location (pt. Location): {video visit patient location:034823::\"Home\"}    {PROVIDER LOCATION On-site should be selected for visits conducted from your clinic location or adjoining [facility] hospital, academic office, or other nearby [facility] building. Off-site should be selected for all other provider locations, including home:[id number]}    Distant Location (provider location):  {virtual location provider:514502}    Platform used for Video Visit: {Virtual Visit Platforms:299076::\"AmWell\"}    Lynn is a 57 year old who is being evaluated via a billable video visit.  Currently in state of [place].    How would you like to obtain your AVS? MyChart  If the video visit is dropped, the invitation should be resent by: Text to cell phone: [phone]  Will anyone else be joining your video visit? No     Video Start Time: 10 am   Video-Visit Details    Type of service:  Video Visit    Video End [id number] am  Originating Location (pt. Location): Home    Distant Location (provider location):  [facility]     Platform used for Video Visit: ParkerVision "     CASTILLO Crow      [facility]    Hematology/Oncology Established Patient Note      Today's Date: 10/18/2022  Reason for Follow-up: anemia      HISTORY OF PRESENT ILLNESS: Lynn Thompson is a 57 year old female with PMHx of total pancreatectomy, islet cell autotransplant, splenectomy on 5/10/13, post pancreatectomy diabetes, gastric bypass in 2001, surgery for small bowel obstruction, history of Crohn's disease s/p partial terminal ileum resection, who presents with anemia.       Lynn says that she has had anemia all of her life.  She was previously followed by Dr. Tom Malcolm at [facility] since 2004.  She was followed and treated for iron-deficiency anemia.  It was felt that it was due to poor absorption from her gastric bypass and various bowel surgeries in the past.  She was unable to tolerate oral and and could not absorb it.  She gets IV iron intermittently.  She says a round of IV iron would last her for several years.  She last saw Dr. Malcolm in 2019.      Patient has been anemic again.  She underwent colonoscopy on 4/1/21, which found some polyps that were removed.  Due to unintentional weight loss, she underwent mammogram that was negative.  She had a CT abdomen/pelvis on 3/1/21 that was normal, other than non-specific proximal colitis.  She does not smoke.      She received Injectafer x 2 doses in May 2021.      INTERIM HISTORY: Lynn feels well.  She just had surgery for her rectal prolapse about a month ago.  She says that she recovered quickly and feels very well now.        REVIEW OF SYSTEMS:   14 point ROS was reviewed and is negative other than as noted above in HPI.       HOME MEDICATIONS:  Current Outpatient Medications   Medication Sig Dispense Refill     calcium carbonate 600 mg-vitamin D 400 units (CALTRATE) 600-400 MG-UNIT per tablet Take 1 tablet by mouth daily       DULoxetine (CYMBALTA) 20 MG capsule Take 20 mg by mouth daily        escitalopram (LEXAPRO) 20 MG tablet Take 20 mg by mouth daily       famotidine (PEPCID) 40 MG tablet Take 1 tablet (40 mg) by mouth 2 times daily as needed for heartburn 180 tablet 1     FIASP PENFILL 100 UNIT/ML SOCT Inject 0.5-4 Units Subcutaneous 4 times daily (with meals and nightly) (Patient taking differently: Inject 0.5-4 Units Subcutaneous 3 times daily (with meals)) 15 mL 5     gabapentin (NEURONTIN) 300 MG capsule Take 300 mg by mouth nightly as needed       Glucagon (GVOKE HYPOPEN 1-PACK) 1 MG/0.2ML SOAJ Inject 1 mg Subcutaneous once as needed (for hypoglycemia with loss of consciousness) 15 mL 5     glucose 40 % GEL Take 15-30 g by mouth every 15 minutes as needed. 1 Tube 11     hydrOXYzine (ATARAX) 25 MG tablet TAKE 1-2 TABLETS BY MOUTH 2 TIMES DAILY AS NEEDED FOR ANXIETY  0     Insulin Disposable Pump (OMNIPOD 5 G6 INTRO, GEN 5,) KIT 1 each continuous 1 kit 0     Insulin Disposable Pump (OMNIPOD 5 G6 POD, GEN 5,) MISC 1 each every 3 days 30 each 5     insulin glargine (LANTUS PEN) 100 UNIT/ML pen Inject 5 Units Subcutaneous At Bedtime Inject 6 units daily (Patient taking differently: Inject 5 Units Subcutaneous At Bedtime Inject 5 units daily) 15 mL 11     levothyroxine (SYNTHROID/LEVOTHROID) 100 MCG tablet Take 1 tablet (100 mcg) by mouth daily 90 tablet 0     lipase-protease-amylase (VIOKACE) 04577-31021 units TABS tablet 5 with meals and 3 with snacks.  Up to 20 per day supply. 500 tablet 11     loratadine (CLARITIN) 10 MG tablet Take 10 mg by mouth daily as needed        modafinil (PROVIGIL) 200 MG tablet Take 400 mg by mouth daily       omeprazole (PRILOSEC) 40 MG DR capsule Take 1 capsule (40 mg) by mouth 2 times daily Take 30-60 minutes before a meal. 180 capsule 3     oxybutynin (DITROPAN) 5 MG tablet Take 1 tablet (5 mg) by mouth 3 times daily as needed for bladder spasms 20 tablet 0     traZODone (DESYREL) 50 MG tablet Take 50 mg by mouth nightly as needed for sleep       estradiol (ESTRACE)  0.1 MG/GM vaginal cream PLACE 2 GRAMS VAGINALLY 2 TIMES WEEKLY (Patient not taking: No sig reported) 42.5 g 1     insulin pen needle (32G X 4 MM) 32G X 4 MM miscellaneous Use 4-6 pen needles daily to inject subcutaneous insulin 200 each 11         ALLERGIES:  Allergies   Allergen Reactions     Corticosteroids Other (See Comments)     All oral, IV and injectable steroids are contraindicated (unless in life threatening situations) in Islet Auto transplant recipients. They can cause irreversible loss of islet cell function. Please contact patient's transplant care coordinator, Erlinda Multani RN BSN at [phone]/pager: [phone] and endocrinologist prior to administration.       Povidone Iodine Hives     Causes skin to blister     Nsaids      naprosyn = GI upset     Sulfasalazine Nausea and Nausea and Vomiting         PAST MEDICAL HISTORY:  Past Medical History:   Diagnosis Date     Benign paroxysmal positional vertigo     occ.      Calculus of kidney 05/2005    x1 on L side passed, several stones.  Has been tested for oxalate.     Chronic abdominal pain 2013     Chronic pancreatitis 2013     Depression     also occ panic spells     Diabetes (H) 5/10/2013    Total Pancreatomy with Auto Islets Transplant     Dyspepsia 1999    H. pylori   treated     Headaches     still periodic HA's ;  often 5X/week     Hypertension 2016    Stress related     Iron deficiency anemia 2003    relates to gastric bypass     Post-pancreatectomy diabetes melltius 2013     Sleep apnea     uses splint     Spasm of sphincter of Oddi     surgical + endoscopic stenting of pancreatic duct @ [facility] 06     Thyroid nodule 2016         PAST SURGICAL HISTORY:  Past Surgical History:   Procedure Laterality Date     ABDOMINOPLASTY  2002    Tummy tuck     APPENDECTOMY  1990     BUNIONECTOMY Right 1998     CBD Stent placement  2002    CBD stent; Dr. Presley      SECTION        CHOLECYSTECTOMY       COLONOSCOPY N/A 03/31/2021    Procedure: COLONOSCOPY INCOMPLETE Aborted due to incomplete prep  will need to take additional prep and return tomorrow 4/1/21;  Surgeon: Ihsan Saenz MD;  Location: [facility]     COMBINED CYSTOSCOPY, RETROGRADES, URETEROSCOPY, LASER HOLMIUM LITHOTRIPSY URETER(S), INSERT STENT Right 03/23/2015    Procedure: COMBINED CYSTOSCOPY, RETROGRADES, URETEROSCOPY, LASER HOLMIUM LITHOTRIPSY URETER(S), INSERT STENT;  Surgeon: Kennedi Aldana MD;  Location: [facility]     COMBINED CYSTOSCOPY, RETROGRADES, URETEROSCOPY, LASER HOLMIUM LITHOTRIPSY URETER(S), INSERT STENT Right 04/20/2015    Procedure: COMBINED CYSTOSCOPY, RETROGRADES, URETEROSCOPY, LASER HOLMIUM LITHOTRIPSY URETER(S), INSERT STENT;  Surgeon: Kennedi Aldana MD;  [facility]: [facility]     COSMETIC SURGERY  6/24/2002    Tummy tuck     CYSTECTOMY OVARIAN BENIGN Right      CYSTOSCOPY, RETROGRADES, INSERT STENT URETER(S), COMBINED  10/02/2012    Procedure: COMBINED CYSTOSCOPY, RETROGRADES, INSERT STENT URETER(S);  COMBINED CYSTOSCOPY,  , INSERT LEFT STENT URETER;  Surgeon: Johny Baez MD;  Location: [facility]     CYSTOSCOPY, RETROGRADES, INSERT STENT URETER(S), COMBINED Left 9/20/2022    Procedure: Cystoscopy with left retrograde pyelogram, left ureteroscopy, thulium laser lithotripsy of left ureteral calculus, stone basketing and left ureteral stent insertion;  Surgeon: Alonzo Rome MD;  Location: [facility]     ESOPHAGOSCOPY, GASTROSCOPY, DUODENOSCOPY (EGD), COMBINED N/A 01/24/2018    Procedure: COMBINED ESOPHAGOSCOPY, GASTROSCOPY, DUODENOSCOPY (EGD);  ESOPHAGOSCOPY, GASTROSCOPY, DUODENOSCOPY (EGD)    ;  Surgeon: Tamir Rodgers MD;  Location: [facility]     EXTRACORPOREAL SHOCK WAVE LITHOTRIPSY (ESWL)  10/16/2012    Procedure: EXTRACORPOREAL SHOCK WAVE LITHOTRIPSY (ESWL);  left EXTRACORPOREAL SHOCK WAVE LITHOTRIPSY (ESWL) ;  Surgeon: Johny Baez MD;  Location: [facility]     Gastric bypass NOS       HERNIA  REPAIR  02/2015     HYSTERECTOMY SUPRACERVICAL, BILATERAL SALPINGO-OOPHORECTOMY, COMBINED N/A 02/01/2022    Procedure: Abdominal supracervical hysterectomy, bilateral salpingooophrectomy;  Surgeon: Nicole Rivera MD;  [facility]     IRRIGATION AND DEBRIDEMENT HAND, COMBINED Left 10/30/2020    Procedure: Left hand sharp excisional debridement of skin, subcutaneous tissue and fat with a scalpel, 2 x 1 x 1 cm.;  Surgeon: Demian Renteria MD;  Location: [facility]     LAPAROSCOPIC LYSIS ADHESIONS N/A 02/20/2015    Procedure: LAPAROSCOPIC LYSIS ADHESIONS;  Surgeon: Aaron Early MD;  [facility]     LAPAROSCOPIC LYSIS ADHESIONS N/A 12/29/2015    Procedure: LAPAROSCOPIC LYSIS ADHESIONS;  Surgeon: Aaron Early MD;  [facility]     PANCREATECTOMY, TRANSPLANT AUTO ISLET CELL, COMBINED  05/10/2013    Procedure: COMBINED PANCREATECTOMY, TRANSPLANT AUTO ISLET CELL;  Pancreatectomy, Auto Islet Cell Transplant   hernia repair, jejunostomy tube and liver biopsies with Anesthesia General with block;  Surgeon: Aaron Early MD;  [facility]     Partial ileum resection  1992     RECTOPEXY ABDOMINAL N/A 02/01/2022    Procedure: RECTOPEXY, ABDOMINAL;  Surgeon: Uriah Sheridan MD;  [facility]     REPAIR PTOSIS BROW BILATERAL Bilateral 06/09/2020    Procedure: BILATERAL BROW PTOSIS REPAIR;  Surgeon: Denise Alberts MD;  Location: [facility]     SACROCOLPOPEXY, CYSTOSCOPY, COMBINED N/A 02/01/2022    Procedure: Uterosacral ligament suspension, pina colposuspension with Cystoscopy;  Surgeon: Nicole Rivera MD;  [facility]     SIGMOIDOSCOPY FLEXIBLE N/A 02/01/2022    Procedure: SIGMOIDOSCOPY, FLEXIBLE;  Surgeon: Uriah Sheridan MD;  [facility]     Surgery for SBO  2015     TONSILLECTOMY, ADENOIDECTOMY, COMBINED  1997     TRANSPLANT  5/10/13    Pancreatic Auto-Islet Transplant         SOCIAL HISTORY:  Social History     Socioeconomic History     Marital status:       Spouse name: Tera     Number of children: 2     Years of education: Not on file     Highest education level: Some college, no degree   Occupational History     Occupation: customer service     Employer: [employer]   Tobacco Use     Smoking status: Never     Smokeless tobacco: Never   Vaping Use     Vaping Use: Never used   Substance and Sexual Activity     Alcohol use: Not Currently     Drug use: Not Currently     Sexual activity: Yes     Partners: Male     Birth control/protection: Post-menopausal   Other Topics Concern     Parent/sibling w/ CABG, MI or angioplasty before 65F 55M? No   Social History Narrative     Not on file     Social Determinants of Health     Financial Resource Strain: Not on file   Food Insecurity: Not on file   Transportation Needs: Not on file   Physical Activity: Not on file   Stress: Not on file   Social Connections: Not on file   Intimate Partner Violence: Not At Risk     Fear of Current or Ex-Partner: No     Emotionally Abused: No     Physically Abused: No     Sexually Abused: No   Housing Stability: Not on file         FAMILY HISTORY:  Family History   Problem Relation Age of Onset     Family History Negative Mother      Respiratory Father         COPD;  at 69     Genitourinary Problems Father         kidney stones     Substance Abuse Father      Depression Father      Asthma Father      Heart Disease Paternal Grandfather         M.I.     Coronary Artery Disease Paternal Grandfather      Hyperlipidemia Paternal Grandfather      Genitourinary Problems Brother         multiple brothers with kidney stones     Gastrointestinal Disease Maternal Grandmother         undiagnosed 'gut' issues     Coronary Artery Disease Maternal Grandfather      Hyperlipidemia Maternal Grandfather      Cerebrovascular Disease Paternal Grandmother         At the age of 103     Anxiety Disorder Paternal Grandmother      Osteoporosis Paternal Grandmother      Anxiety Disorder Son      Anxiety  Disorder Daughter      Asthma Daughter      Colon Cancer No family hx of          PHYSICAL EXAM:  ECO  GENERAL/CONSTITUTIONAL: No acute distress. Healthy, alert.  RESPIRATORY: No audible wheeze, cough, or visible cyanosis.  No visible retractions or increased work of breathing.  Able to speak fully in complete sentences.  NEUROLOGIC: Alert, oriented, answers questions appropriately. No tremor. Mentation intact and speech normal  INTEGUMENTARY: No jaundice.    PSYCHIATRIC:  Mentation appears normal, affect normal/bright, judgement and insight intact, normal speech and appearance well-groomed.    The rest of a comprehensive physical exam is deferred due to public health emergency video visit restrictions.        LABS:  Labs noted and reviewed ferritin 77 iron saturation >50%  ASSESSMENT/PLAN:  Lynn Thompson is a 57 year old female with:    1) Iron-deficiency anemia: chronic, likely related to poor absorption from her history of gastric bypass and bowel surgeries.  She had a colonoscopy on 21 that showed no bleeding.  She is unable to tolerate oral iron due to side effects and poor absorption.  She does get IV iron intermittently.      She last received Injectafer in May 2021, with normalization of her hemoglobin and iron.  She felt better afterwards as well.    Her hemoglobin decreased after surgery in 2022, but has since recovered.    Recent labs show normal hemoglobin and iron/ferritin.      -RTC in ~6 months with repeat CBC, ferritin, iron.  Patient prefers video visit. She can do labs in 3 months with chart check and SHINE visit in 6 months     2) Rectal prolapse:  Resolved    Nico Montero MD  Hematology/Oncology  [facility]    Total time spent on day of visit, including review of tests, obtaining/reviewing separately obtained history, ordering medications/tests/procedures, communicating with PCP/consultants, and documenting in electronic medical record: 10  minutes      Again, thank you for allowing me to participate in the care of your patient.        Sincerely,        Nico Montero MD

## 2022-10-18 NOTE — LETTER
"    10/18/2022         RE: Lynn Thompson  [address] [facility] [address]        Dear Colleague,    Thank you for referring your patient, Lynn Thompson, to the [facility]. Please see a copy of my visit note below.    Lynn is a 57 year old who is being evaluated via a billable video visit.      How would you like to obtain your AVS? MyChart  If the video visit is dropped, the invitation should be resent by: Text to cell phone: [phone]  Will anyone else be joining your video visit? No     Yue CHONG    {If patient encounters technical issues they should call [phone] :[id number]}      Video-Visit Details    Video Start Time: {video visit start/end time for provider to select:261867}    Type of service:  Video Visit    Video End Time:{video visit start/end time for provider to select:909539}    Originating Location (pt. Location): {video visit patient location:628108::\"Home\"}    {PROVIDER LOCATION On-site should be selected for visits conducted from your clinic location or adjoining [facility] hospital, academic office, or other nearby [facility] building. Off-site should be selected for all other provider locations, including home:072238}    Distant Location (provider location):  {virtual location provider:628375}    Platform used for Video Visit: {Virtual Visit Platforms:816570::\"AmWell\"}    Lynn is a 57 year old who is being evaluated via a billable video visit.  Currently in state of [place].    How would you like to obtain your AVS? MyChart  If the video visit is dropped, the invitation should be resent by: Text to cell phone: [phone]  Will anyone else be joining your video visit? No     Video Start Time: 10 am   Video-Visit Details    Type of service:  Video Visit    Video End [id number] am  Originating Location (pt. 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       Lynn says that she has had anemia all of her life.  She was previously followed by Dr. Tom Malcolm at [facility] since 2004.  She was followed and treated for iron-deficiency anemia.  It was felt that it was due to poor absorption from her gastric bypass and various bowel surgeries in the past.  She was unable to tolerate oral and and could not absorb it.  She gets IV iron intermittently.  She says a round of IV iron would last her for several years.  She last saw Dr. Malcolm in 2019.      Patient has been anemic again.  She underwent colonoscopy on 4/1/21, which found some polyps that were removed.  Due to unintentional weight loss, she underwent mammogram that was negative.  She had a CT abdomen/pelvis on 3/1/21 that was normal, other than non-specific proximal colitis.  She does not smoke.      She received Injectafer x 2 doses in May 2021.      INTERIM HISTORY: Lynn feels well.  She just had surgery for her rectal prolapse about a month ago.  She says that she recovered quickly and feels very well now.        REVIEW OF SYSTEMS:   14 point ROS was reviewed and is negative other than as noted above in HPI.       HOME MEDICATIONS:  Current Outpatient Medications   Medication Sig Dispense Refill     calcium carbonate 600 mg-vitamin D 400 units (CALTRATE) 600-400 MG-UNIT per tablet Take 1 tablet by mouth daily       DULoxetine (CYMBALTA) 20 MG capsule Take 20 mg by mouth daily        escitalopram (LEXAPRO) 20 MG tablet Take 20 mg by mouth daily       famotidine (PEPCID) 40 MG tablet Take 1 tablet (40 mg) by mouth 2 times daily as needed for heartburn 180 tablet 1     FIASP PENFILL 100 UNIT/ML SOCT Inject 0.5-4 Units Subcutaneous 4 times daily (with meals and nightly) (Patient taking differently: Inject 0.5-4 Units Subcutaneous 3 times daily (with meals)) 15 mL 5     gabapentin (NEURONTIN) 300 MG capsule Take 300 mg by mouth nightly as needed       Glucagon (GVOKE HYPOPEN 1-PACK) 1 MG/0.2ML SOAJ Inject 1 mg Subcutaneous once as needed (for hypoglycemia with loss of consciousness) 15 mL 5     glucose 40 % GEL Take 15-30 g by mouth every 15 minutes as needed. 1 Tube 11     hydrOXYzine (ATARAX) 25 MG tablet TAKE 1-2 TABLETS BY MOUTH 2 TIMES DAILY AS NEEDED FOR ANXIETY  0     Insulin Disposable Pump (OMNIPOD 5 G6 INTRO, GEN 5,) KIT 1 each continuous 1 kit 0     Insulin Disposable Pump (OMNIPOD 5 G6 POD, GEN 5,) MISC 1 each every 3 days 30 each 5     insulin glargine (LANTUS PEN) 100 UNIT/ML pen Inject 5 Units Subcutaneous At Bedtime Inject 6 units daily (Patient taking differently: Inject 5 Units Subcutaneous At Bedtime Inject 5 units daily) 15 mL 11     levothyroxine (SYNTHROID/LEVOTHROID) 100 MCG tablet Take 1 tablet (100 mcg) by mouth daily 90 tablet 0     lipase-protease-amylase (VIOKACE) 19591-10981 units TABS tablet 5 with meals and 3 with snacks.  Up to 20 per day supply. 500 tablet 11     loratadine (CLARITIN) 10 MG tablet Take 10 mg by mouth daily as needed        modafinil (PROVIGIL) 200 MG tablet Take 400 mg by mouth daily       omeprazole (PRILOSEC) 40 MG DR capsule Take 1 capsule (40 mg) by mouth 2 times daily Take 30-60 minutes before a meal. 180 capsule 3     oxybutynin (DITROPAN) 5 MG tablet Take 1 tablet (5 mg) by mouth 3 times daily as needed for bladder spasms 20 tablet 0     traZODone (DESYREL) 50 MG tablet Take 50 mg by mouth nightly as needed for sleep       estradiol (ESTRACE)  0.1 MG/GM vaginal cream PLACE 2 GRAMS VAGINALLY 2 TIMES WEEKLY (Patient not taking: No sig reported) 42.5 g 1     insulin pen needle (32G X 4 MM) 32G X 4 MM miscellaneous Use 4-6 pen needles daily to inject subcutaneous insulin 200 each 11         ALLERGIES:  Allergies   Allergen Reactions     Corticosteroids Other (See Comments)     All oral, IV and injectable steroids are contraindicated (unless in life threatening situations) in Islet Auto transplant recipients. They can cause irreversible loss of islet cell function. Please contact patient's transplant care coordinator, Erlinda Multani RN BSN at [phone]/pager: [phone] and endocrinologist prior to administration.       Povidone Iodine Hives     Causes skin to blister     Nsaids      naprosyn = GI upset     Sulfasalazine Nausea and Nausea and Vomiting         PAST MEDICAL HISTORY:  Past Medical History:   Diagnosis Date     Benign paroxysmal positional vertigo     occ.      Calculus of kidney 05/2005    x1 on L side passed, several stones.  Has been tested for oxalate.     Chronic abdominal pain 2013     Chronic pancreatitis 2013     Depression     also occ panic spells     Diabetes (H) 5/10/2013    Total Pancreatomy with Auto Islets Transplant     Dyspepsia 1999    H. pylori   treated     Headaches     still periodic HA's ;  often 5X/week     Hypertension 2016    Stress related     Iron deficiency anemia 2003    relates to gastric bypass     Post-pancreatectomy diabetes melltius 2013     Sleep apnea     uses splint     Spasm of sphincter of Oddi     surgical [place] [facility]     Thyroid nodule 2016         PAST SURGICAL HISTORY:  Past Surgical History:   Procedure Laterality Date     ABDOMINOPLASTY  2002    Tummy tuck     APPENDECTOMY  1990     BUNIONECTOMY Right 1998     CBD Stent placement  2002    CBD stent; Dr. Presley      SECTION        CHOLECYSTECTOMY       COLONOSCOPY N/A 03/31/2021    Procedure: COLONOSCOPY INCOMPLETE Aborted due to incomplete prep  will need to take additional prep and return tomorrow 4/1/21;  Surgeon: Ihsan Saenz MD;  Location: [facility]     COMBINED CYSTOSCOPY, RETROGRADES, URETEROSCOPY, LASER HOLMIUM LITHOTRIPSY URETER(S), INSERT STENT Right 03/23/2015    Procedure: COMBINED CYSTOSCOPY, RETROGRADES, URETEROSCOPY, LASER HOLMIUM LITHOTRIPSY URETER(S), INSERT STENT;  Surgeon: Kennedi Aldana MD;  Location: [facility]     COMBINED CYSTOSCOPY, RETROGRADES, URETEROSCOPY, LASER HOLMIUM LITHOTRIPSY URETER(S), INSERT STENT Right 04/20/2015    Procedure: COMBINED CYSTOSCOPY, RETROGRADES, URETEROSCOPY, LASER HOLMIUM LITHOTRIPSY URETER(S), INSERT STENT;  Surgeon: Kennedi Aldana MD;  [facility]     COSMETIC SURGERY  6/24/2002    Tummy tuck     CYSTECTOMY OVARIAN BENIGN Right      CYSTOSCOPY, RETROGRADES, INSERT STENT URETER(S), COMBINED  10/02/2012    Procedure: COMBINED CYSTOSCOPY, RETROGRADES, INSERT STENT URETER(S);  COMBINED CYSTOSCOPY,  , INSERT LEFT STENT URETER;  Surgeon: Johny Baez MD;  Location: [facility]     CYSTOSCOPY, RETROGRADES, INSERT STENT URETER(S), COMBINED Left 9/20/2022    Procedure: Cystoscopy with left retrograde pyelogram, left ureteroscopy, thulium laser lithotripsy of left ureteral calculus, stone basketing and left ureteral stent insertion;  Surgeon: Alonzo Rome MD;  Location: [facility]     ESOPHAGOSCOPY, GASTROSCOPY, DUODENOSCOPY (EGD), COMBINED N/A 01/24/2018    Procedure: COMBINED ESOPHAGOSCOPY, GASTROSCOPY, DUODENOSCOPY (EGD);  ESOPHAGOSCOPY, GASTROSCOPY, DUODENOSCOPY (EGD)    ;  Surgeon: Tamir Rodgers MD;  Location: [facility]     EXTRACORPOREAL SHOCK WAVE LITHOTRIPSY (ESWL)  10/16/2012    Procedure: EXTRACORPOREAL SHOCK WAVE LITHOTRIPSY (ESWL);  left EXTRACORPOREAL SHOCK WAVE LITHOTRIPSY (ESWL) ;  Surgeon: Johny Baez MD;  Location: [facility]     Gastric bypass NOS       HERNIA  REPAIR  02/2015     HYSTERECTOMY SUPRACERVICAL, BILATERAL SALPINGO-OOPHORECTOMY, COMBINED N/A 02/01/2022    Procedure: Abdominal supracervical hysterectomy, bilateral salpingooophrectomy;  Surgeon: Nicole Rivera MD;  [facility]     IRRIGATION AND DEBRIDEMENT HAND, COMBINED Left 10/30/2020    Procedure: Left hand sharp excisional debridement of skin, subcutaneous tissue and fat with a scalpel, 2 x 1 x 1 cm.;  Surgeon: Demian Renteria MD;  Location: [facility]     LAPAROSCOPIC LYSIS ADHESIONS N/A 02/20/2015    Procedure: LAPAROSCOPIC LYSIS ADHESIONS;  Surgeon: Aaron Early MD;  [facility]     LAPAROSCOPIC LYSIS ADHESIONS N/A 12/29/2015    Procedure: LAPAROSCOPIC LYSIS ADHESIONS;  Surgeon: Aaron Early MD;  [facility]     PANCREATECTOMY, TRANSPLANT AUTO ISLET CELL, COMBINED  05/10/2013    Procedure: COMBINED PANCREATECTOMY, TRANSPLANT AUTO ISLET CELL;  Pancreatectomy, Auto Islet Cell Transplant   hernia repair, jejunostomy tube and liver biopsies with Anesthesia General with block;  Surgeon: Aaron Early MD;  [facility]     Partial ileum resection  1992     RECTOPEXY ABDOMINAL N/A 02/01/2022    Procedure: RECTOPEXY, ABDOMINAL;  Surgeon: Uriah Sheridan MD;  [facility]     REPAIR PTOSIS BROW BILATERAL Bilateral 06/09/2020    Procedure: BILATERAL BROW PTOSIS REPAIR;  Surgeon: Denise Alberts MD;  Location: [facility]     SACROCOLPOPEXY, CYSTOSCOPY, COMBINED N/A 02/01/2022    Procedure: Uterosacral ligament suspension, pina colposuspension with Cystoscopy;  Surgeon: Nicole Rivera MD;  [facility]     SIGMOIDOSCOPY FLEXIBLE N/A 02/01/2022    Procedure: SIGMOIDOSCOPY, FLEXIBLE;  Surgeon: Uriah Sheridan MD;  [facility]     Surgery for SBO  2015     TONSILLECTOMY, ADENOIDECTOMY, COMBINED  1997     TRANSPLANT  5/10/13    Pancreatic Auto-Islet Transplant         SOCIAL HISTORY:  Social History     Socioeconomic History     Marital status:       Spouse name: Tera     Number of children: 2     Years of education: Not on file     Highest education level: Some college, no degree   Occupational History     Occupation: customer service     Employer: [employer]   Tobacco Use     Smoking status: Never     Smokeless tobacco: Never   Vaping Use     Vaping Use: Never used   Substance and Sexual Activity     Alcohol use: Not Currently     Drug use: Not Currently     Sexual activity: Yes     Partners: Male     Birth control/protection: Post-menopausal   Other Topics Concern     Parent/sibling w/ CABG, MI or angioplasty before 65F 55M? No   Social History Narrative     Not on file     Social Determinants of Health     Financial Resource Strain: Not on file   Food Insecurity: Not on file   Transportation Needs: Not on file   Physical Activity: Not on file   Stress: Not on file   Social Connections: Not on file   Intimate Partner Violence: Not At Risk     Fear of Current or Ex-Partner: No     Emotionally Abused: No     Physically Abused: No     Sexually Abused: No   Housing Stability: Not on file         FAMILY HISTORY:  Family History   Problem Relation Age of Onset     Family History Negative Mother      Respiratory Father         COPD;  at 69     Genitourinary Problems Father         kidney stones     Substance Abuse Father      Depression Father      Asthma Father      Heart Disease Paternal Grandfather         M.I.     Coronary Artery Disease Paternal Grandfather      Hyperlipidemia Paternal Grandfather      Genitourinary Problems Brother         multiple brothers with kidney stones     Gastrointestinal Disease Maternal Grandmother         undiagnosed 'gut' issues     Coronary Artery Disease Maternal Grandfather      Hyperlipidemia Maternal Grandfather      Cerebrovascular Disease Paternal Grandmother         At the age of 103     Anxiety Disorder Paternal Grandmother      Osteoporosis Paternal Grandmother      Anxiety Disorder Son      Anxiety  Disorder Daughter      Asthma Daughter      Colon Cancer No family hx of          PHYSICAL EXAM:  ECO  GENERAL/CONSTITUTIONAL: No acute distress. Healthy, alert.  RESPIRATORY: No audible wheeze, cough, or visible cyanosis.  No visible retractions or increased work of breathing.  Able to speak fully in complete sentences.  NEUROLOGIC: Alert, oriented, answers questions appropriately. No tremor. Mentation intact and speech normal  INTEGUMENTARY: No jaundice.    PSYCHIATRIC:  Mentation appears normal, affect normal/bright, judgement and insight intact, normal speech and appearance well-groomed.    The rest of a comprehensive physical exam is deferred due to public health emergency video visit restrictions.        LABS:  Labs noted and reviewed ferritin 77 iron saturation >50%  ASSESSMENT/PLAN:  Lynn Thompson is a 57 year old female with:    1) Iron-deficiency anemia: chronic, likely related to poor absorption from her history of gastric bypass and bowel surgeries.  She had a colonoscopy on 21 that showed no bleeding.  She is unable to tolerate oral iron due to side effects and poor absorption.  She does get IV iron intermittently.      She last received Injectafer in May 2021, with normalization of her hemoglobin and iron.  She felt better afterwards as well.    Her hemoglobin decreased after surgery in 2022, but has since recovered.    Recent labs show normal hemoglobin and iron/ferritin.      -RTC in ~6 months with repeat CBC, ferritin, iron.  Patient prefers video visit. She can do labs in 3 months with chart check and SHNIE visit in 6 months     2) Rectal prolapse:  Resolved    Nico Montero MD  Hematology/Oncology  [facility]    Total time spent on day of visit, including review of tests, obtaining/reviewing separately obtained history, ordering medications/tests/procedures, communicating with PCP/consultants, and documenting in electronic medical record: 10  minutes      Again, thank you for allowing me to participate in the care of your patient.        Sincerely,        Nico Montero MD

## 2022-10-18 NOTE — PROGRESS NOTES
Lynn is a 57 year old who is being evaluated via a billable video visit.      How would you like to obtain your AVS? MyChart  If the video visit is dropped, the invitation should be resent by: Text to cell phone: [phone]  Will anyone else be joining your video visit? Ina CHONG          Video-Visit Details    Video Start Time: 12:51 PM    Type of service:  Video Visit    Video End Time:12:51 PM    Originating Location (pt. Location): Home        Distant Location (provider location):  On-site    Platform used for Video Visit: [employer]

## 2022-10-19 DIAGNOSIS — E13.9 POST-PANCREATECTOMY DIABETES (H): ICD-10-CM

## 2022-10-19 DIAGNOSIS — E89.1 POST-PANCREATECTOMY DIABETES (H): ICD-10-CM

## 2022-10-19 DIAGNOSIS — Z90.410 POST-PANCREATECTOMY DIABETES (H): ICD-10-CM

## 2022-10-25 DIAGNOSIS — K21.9 GASTROESOPHAGEAL REFLUX DISEASE WITHOUT ESOPHAGITIS: ICD-10-CM

## 2022-10-25 RX ORDER — OMEPRAZOLE 40 MG/1
40 CAPSULE, DELAYED RELEASE ORAL 2 TIMES DAILY
Qty: 180 CAPSULE | Refills: 0 | Status: SHIPPED | OUTPATIENT
Start: 2022-10-25 | End: 2022-12-13

## 2022-10-25 RX ORDER — INSULIN ASPART INJECTION 100 [IU]/ML
INJECTION, SOLUTION SUBCUTANEOUS
Qty: 15 ML | Refills: 0 | Status: SHIPPED | OUTPATIENT
Start: 2022-10-25

## 2022-10-25 NOTE — TELEPHONE ENCOUNTER
Prescription approved per [facility] Refill Protocol.    Andrea ARROYO RN

## 2022-10-26 ENCOUNTER — TELEPHONE (OUTPATIENT)
Dept: FAMILY MEDICINE | Facility: CLINIC | Age: 57
End: 2022-10-26

## 2022-10-26 NOTE — TELEPHONE ENCOUNTER
Prior Authorization Retail Medication Request    Medication/Dose: omeprazole (PRILOSEC) 40 MG DR capsule  ICD code (if different than what is on RX):    Previously Tried and Failed:  pantoprazole (PROTONIX) EC tablet 40 mg, ranitidine (ZANTAC) 150 MG tablet  Rationale:      [employer]    KEY: [id number]  PATIENT LAST NAME: JAYDEN  : 1965      Pharmacy Information (if different than what is on RX)  Name:    Phone:        Edy DHILLON

## 2022-10-27 NOTE — TELEPHONE ENCOUNTER
PA Initiation    Medication: omeprazole (PRILOSEC) 40 MG  capsule PA RENEWAL INITIATED  Insurance Company: [email or web address] - Phone [phone] Fax [phone]  Pharmacy Filling the Rx: [id number] [place], [place] - [address] [address]  Filling Pharmacy Phone: [phone]  Filling Pharmacy Fax:    Start Date: 10/27/2022    Central Prior Authorization Team   Phone: [phone]

## 2022-10-30 ENCOUNTER — TRANSFERRED RECORDS (OUTPATIENT)
Dept: HEALTH INFORMATION MANAGEMENT | Facility: CLINIC | Age: 57
End: 2022-10-30

## 2022-10-31 ENCOUNTER — LAB REQUISITION (OUTPATIENT)
Dept: LAB | Facility: CLINIC | Age: 57
End: 2022-10-31

## 2022-10-31 ENCOUNTER — OFFICE VISIT (OUTPATIENT)
Dept: NEUROLOGY | Facility: CLINIC | Age: 57
End: 2022-10-31
Payer: COMMERCIAL

## 2022-10-31 ENCOUNTER — ALLIED HEALTH/NURSE VISIT (OUTPATIENT)
Dept: OPHTHALMOLOGY | Facility: CLINIC | Age: 57
End: 2022-10-31
Attending: OPHTHALMOLOGY
Payer: COMMERCIAL

## 2022-10-31 ENCOUNTER — HOSPITAL ENCOUNTER (OUTPATIENT)
Dept: RESEARCH | Facility: CLINIC | Age: 57
Discharge: HOME | End: 2022-11-01
Attending: PEDIATRICS | Admitting: PEDIATRICS
Payer: COMMERCIAL

## 2022-10-31 ENCOUNTER — HOSPITAL ENCOUNTER (OUTPATIENT)
Dept: RESEARCH | Facility: CLINIC | Age: 57
Discharge: HOME | End: 2022-10-31
Attending: PEDIATRICS
Payer: COMMERCIAL

## 2022-10-31 DIAGNOSIS — Z00.6 EXAM FOR CLINICAL RESEARCH: ICD-10-CM

## 2022-10-31 DIAGNOSIS — H33.103 UNSPECIFIED RETINOSCHISIS, BILATERAL: ICD-10-CM

## 2022-10-31 DIAGNOSIS — Z00.6 EXAMINATION OF PARTICIPANT OR CONTROL IN CLINICAL RESEARCH: Primary | ICD-10-CM

## 2022-10-31 DIAGNOSIS — K86.81 EXOCRINE PANCREATIC INSUFFICIENCY: ICD-10-CM

## 2022-10-31 LAB
ALBUMIN SERPL BCG-MCNC: 3.7 G/DL (ref 3.5–5.2)
ALP SERPL-CCNC: 110 U/L (ref 35–104)
ALT SERPL W P-5'-P-CCNC: 36 U/L (ref 10–35)
ANION GAP SERPL CALCULATED.3IONS-SCNC: 10 MMOL/L (ref 7–15)
AST SERPL W P-5'-P-CCNC: 28 U/L (ref 10–35)
BILIRUB SERPL-MCNC: 0.2 MG/DL
BUN SERPL-MCNC: 22.2 MG/DL (ref 8–23)
CALCIUM SERPL-MCNC: 9.2 MG/DL (ref 8.6–10)
CHLORIDE SERPL-SCNC: 108 MMOL/L (ref 98–107)
CREAT SERPL-MCNC: 0.8 MG/DL (ref 0.51–0.95)
CYSTATIN C (ROCHE): 1 MG/L (ref 0.6–1)
DEPRECATED HCO3 PLAS-SCNC: 25 MMOL/L (ref 22–29)
ERYTHROCYTE [DISTWIDTH] IN BLOOD BY AUTOMATED COUNT: 15.3 % (ref 10–15)
GFR SERPL CREATININE-BSD FRML MDRD: 73 ML/MIN/1.73M2
GFR SERPL CREATININE-BSD FRML MDRD: 84 ML/MIN/1.73M2
GLUCOSE SERPL-MCNC: 97 MG/DL (ref 70–99)
HCT VFR BLD AUTO: 34.2 % (ref 35–47)
HGB BLD-MCNC: 11.2 G/DL (ref 11.7–15.7)
MCH RBC QN AUTO: 30.1 PG (ref 26.5–33)
MCHC RBC AUTO-ENTMCNC: 32.7 G/DL (ref 31.5–36.5)
MCV RBC AUTO: 92 FL (ref 78–100)
PLATELET # BLD AUTO: 369 10E3/UL (ref 150–450)
POTASSIUM SERPL-SCNC: 3.3 MMOL/L (ref 3.4–5.3)
PROT SERPL-MCNC: 5.9 G/DL (ref 6.4–8.3)
RBC # BLD AUTO: 3.72 10E6/UL (ref 3.8–5.2)
SODIUM SERPL-SCNC: 143 MMOL/L (ref 136–145)
WBC # BLD AUTO: 5.7 10E3/UL (ref 4–11)

## 2022-10-31 PROCEDURE — 80053 COMPREHEN METABOLIC PANEL: CPT | Performed by: PEDIATRICS

## 2022-10-31 PROCEDURE — 99207 PR SATISFY VISIT NUMBER: CPT | Performed by: PSYCHIATRY & NEUROLOGY

## 2022-10-31 PROCEDURE — 510N000013 HC RESEARCH GLUCOSE CLAMP, PER CLAMP

## 2022-10-31 PROCEDURE — 99207 PR NO CHARGE COORDINATED CARE PS: CPT

## 2022-10-31 PROCEDURE — 82610 CYSTATIN C: CPT | Performed by: PEDIATRICS

## 2022-10-31 PROCEDURE — 510N000017 HC CRU PATIENT CARE, PER 15 MIN

## 2022-10-31 PROCEDURE — 510N000009 HC RESEARCH FACILITY, PER 15 MIN

## 2022-10-31 PROCEDURE — 85027 COMPLETE CBC AUTOMATED: CPT | Performed by: PEDIATRICS

## 2022-10-31 PROCEDURE — 510N000019 HC RESEARCH MMTT, PER HOUR

## 2022-10-31 RX ORDER — DEXTROSE MONOHYDRATE 25 G/50ML
50 INJECTION, SOLUTION INTRAVENOUS
Status: DISCONTINUED | OUTPATIENT
Start: 2022-10-31 | End: 2022-11-01 | Stop reason: HOSPADM

## 2022-10-31 RX ORDER — NICOTINE POLACRILEX 4 MG
15-30 LOZENGE BUCCAL
Status: DISCONTINUED | OUTPATIENT
Start: 2022-10-31 | End: 2022-11-01 | Stop reason: HOSPADM

## 2022-10-31 RX ORDER — SODIUM CHLORIDE 9 MG/ML
1000 INJECTION, SOLUTION INTRAVENOUS CONTINUOUS
Status: CANCELLED | OUTPATIENT
Start: 2022-10-31

## 2022-10-31 RX ORDER — SODIUM CHLORIDE 9 MG/ML
500 INJECTION, SOLUTION INTRAVENOUS CONTINUOUS
Status: CANCELLED | OUTPATIENT
Start: 2022-10-31

## 2022-10-31 RX ORDER — DEXTROSE 20 G/100ML
40 INJECTION, SOLUTION INTRAVENOUS CONTINUOUS
Status: CANCELLED | OUTPATIENT
Start: 2022-10-31

## 2022-10-31 RX ORDER — DEXTROSE MONOHYDRATE 25 G/50ML
25-50 INJECTION, SOLUTION INTRAVENOUS
Status: DISCONTINUED | OUTPATIENT
Start: 2022-10-31 | End: 2022-11-01 | Stop reason: HOSPADM

## 2022-10-31 ASSESSMENT — ACTIVITIES OF DAILY LIVING (ADL)
ADLS_ACUITY_SCORE: 37
ADLS_ACUITY_SCORE: 37

## 2022-10-31 NOTE — LETTER
10/31/2022       RE: Lynn Thompson  [address] [address] [facility] [address]     Dear Colleague,    Thank you for referring your patient, Lynn Thompson, to the [facility] [place] at [facility]. Please see a copy of my visit note below.                        [facility]  Electrodiagnostic Laboratory                 Department of Neurology                                                                                                         Test Date:  10/31/2022    Patient: Lynn Thompson : 1965 Physician: Rogerio Heller MD   Sex: Female AGE: 57 year Ref Phys: LIFT study   ID#: [id number]   Technician: None     Clinical Information:  Limited NCS performed as part of the LIFT study. No formal report generated.     Rogerio Heller MD  Department of Neurology        Nerve Conduction Studies  Motor Sites      Latency Amplitude Neg. Amp Diff Segment Distance Velocity Neg. Dur Neg Area Diff Temperature Comment   Site (ms) Norm (mV) Norm %  cm m/s Norm ms %  C    Right Fibular (EDB) Motor   Ankle 3.9  < 6.0 4.7  > 2.5  Ankle-EDB 8   5.1  31.4    Bel Fib Head 9.8 - 4.3 - -8.5 Bel Fib Head-Ankle 27 46  > 38 5.5 -8.4 31.8    Pop Fossa 11.6 - 4.4 - 2.3 Pop Fossa-Bel Fib Head 9 50  > 38 5.5 1.67 31.9    Right Ulnar (ADM) Motor   Wrist 3.2  < 3.5 9.6  > 5.0  Wrist-ADM 8   5.7  32.5    Bel Elbow 6.4 - 9.0 - -6.3 Bel Elbow-Wrist 19 59  > 48 6.0 0 32.6    Abv Elbow 8.4 - 8.8 - -2.2 Abv Elbow-Bel Elbow 10.5 53  > 48 5.7 -7.2 32.6      Sensory Sites      Onset Lat Peak Lat Amp (O-P) Amp (P-P) Segment Distance Velocity Temperature Comment   Site ms ms  V Norm  V  cm m/s Norm  C    Right Radial Sensory   Forearm-Wrist 1.75 2.4 39  > 15 49 Forearm-Wrist 10 57 - 32.4    Right Sural Sensory   Calf-Lat Mall 2.8 3.8 6  > 5 10 Calf-Lat Mall 14 50  > 38 32.6    Right Ulnar Sensory   Wrist-Dig V 2.4 3.3 29  > 8 38 Wrist-Dig V 12.5 52  > 48 32.2             NCS Waveforms:    Motor         Sensory                 Sincerely,    Rogerio Heller MD

## 2022-10-31 NOTE — PROGRESS NOTES
[facility]  Electrodiagnostic Laboratory                 Department of Neurology                                                                                                         Test Date:  10/31/2022    Patient: Lynn Thompson : 1965 Physician: Rogerio Heller MD   Sex: Female AGE: 57 year Ref Phys: LIFT study   ID#: [id number]   Technician: None     Clinical Information:  Limited NCS performed as part of the LIFT study. No formal report generated.     Rogerio Heller MD  Department of Neurology        Nerve Conduction Studies  Motor Sites      Latency Amplitude Neg. Amp Diff Segment Distance Velocity Neg. Dur Neg Area Diff Temperature Comment   Site (ms) Norm (mV) Norm %  cm m/s Norm ms %  C    Right Fibular (EDB) Motor   Ankle 3.9  < 6.0 4.7  > 2.5  Ankle-EDB 8   5.1  31.4    Bel Fib Head 9.8 - 4.3 - -8.5 Bel Fib Head-Ankle 27 46  > 38 5.5 -8.4 31.8    Pop Fossa 11.6 - 4.4 - 2.3 Pop Fossa-Bel Fib Head 9 50  > 38 5.5 1.67 31.9    Right Ulnar (ADM) Motor   Wrist 3.2  < 3.5 9.6  > 5.0  Wrist-ADM 8   5.7  32.5    Bel Elbow 6.4 - 9.0 - -6.3 Bel Elbow-Wrist 19 59  > 48 6.0 0 32.6    Abv Elbow 8.4 - 8.8 - -2.2 Abv Elbow-Bel Elbow 10.5 53  > 48 5.7 -7.2 32.6      Sensory Sites      Onset Lat Peak Lat Amp (O-P) Amp (P-P) Segment Distance Velocity Temperature Comment   Site ms ms  V Norm  V  cm m/s Norm  C    Right Radial Sensory   Forearm-Wrist 1.75 2.4 39  > 15 49 Forearm-Wrist 10 57 - 32.4    Right Sural Sensory   Calf-Lat Mall 2.8 3.8 6  > 5 10 Calf-Lat Mall 14 50  > 38 32.6    Right Ulnar Sensory   Wrist-Dig V 2.4 3.3 29  > 8 38 Wrist-Dig V 12.5 52  > 48 32.2            NCS Waveforms:    Motor         Sensory                    None

## 2022-11-01 ENCOUNTER — LAB REQUISITION (OUTPATIENT)
Dept: LAB | Facility: CLINIC | Age: 57
End: 2022-11-01

## 2022-11-01 LAB
CREAT UR-MCNC: 47.3 MG/DL
MICROALBUMIN UR-MCNC: <12 MG/L
MICROALBUMIN/CREAT UR: NORMAL MG/G{CREAT}

## 2022-11-01 PROCEDURE — 510N000009 HC RESEARCH FACILITY, PER 15 MIN

## 2022-11-01 PROCEDURE — 96372 THER/PROPH/DIAG INJ SC/IM: CPT

## 2022-11-01 PROCEDURE — 82043 UR ALBUMIN QUANTITATIVE: CPT | Performed by: PEDIATRICS

## 2022-11-01 PROCEDURE — 250N000011 HC RX IP 250 OP 636: Performed by: PEDIATRICS

## 2022-11-01 PROCEDURE — 510N000017 HC CRU PATIENT CARE, PER 15 MIN

## 2022-11-01 PROCEDURE — 258N000003 HC RX IP 258 OP 636: Performed by: PEDIATRICS

## 2022-11-01 PROCEDURE — 258N000001 HC RX 258: Performed by: PEDIATRICS

## 2022-11-01 PROCEDURE — 510N000016 HC RESEARCH MEALS, PER MEAL

## 2022-11-01 PROCEDURE — 96374 THER/PROPH/DIAG INJ IV PUSH: CPT

## 2022-11-01 RX ORDER — SODIUM CHLORIDE 9 MG/ML
1000 INJECTION, SOLUTION INTRAVENOUS CONTINUOUS
Status: DISCONTINUED | OUTPATIENT
Start: 2022-11-01 | End: 2022-11-01 | Stop reason: HOSPADM

## 2022-11-01 RX ORDER — DEXTROSE 20 G/100ML
40 INJECTION, SOLUTION INTRAVENOUS CONTINUOUS
Status: DISCONTINUED | OUTPATIENT
Start: 2022-11-01 | End: 2022-11-01 | Stop reason: HOSPADM

## 2022-11-01 RX ORDER — SODIUM CHLORIDE 9 MG/ML
500 INJECTION, SOLUTION INTRAVENOUS CONTINUOUS
Status: DISCONTINUED | OUTPATIENT
Start: 2022-11-01 | End: 2022-11-01 | Stop reason: HOSPADM

## 2022-11-01 RX ADMIN — SODIUM CHLORIDE 500 ML: 9 INJECTION, SOLUTION INTRAVENOUS at 08:00

## 2022-11-01 RX ADMIN — POTASSIUM CHLORIDE 500 ML: 2 INJECTION, SOLUTION, CONCENTRATE INTRAVENOUS at 08:00

## 2022-11-01 RX ADMIN — SODIUM CHLORIDE 1000 ML: 9 INJECTION, SOLUTION INTRAVENOUS at 08:00

## 2022-11-01 RX ADMIN — VANCOMYCIN HYDROCHLORIDE 40 ML/HR: 10 INJECTION, POWDER, LYOPHILIZED, FOR SOLUTION INTRAVENOUS at 08:00

## 2022-11-01 ASSESSMENT — ACTIVITIES OF DAILY LIVING (ADL)
ADLS_ACUITY_SCORE: 37

## 2022-11-01 NOTE — ADDENDUM NOTE
Encounter addended by: Yady Taylor RN on: 11/1/2022 1:39 PM   Actions taken: LDA properties accepted, MAR administration accepted

## 2022-11-01 NOTE — TELEPHONE ENCOUNTER
Prior Authorization Approval    Authorization Effective Date: 10/30/2022  Authorization Expiration Date: 10/30/2023  Medication: omeprazole (PRILOSEC) 40 MG  capsule PA RENEWAL APPROVED  Approved Dose/Quantity: 60/30  Reference #:     Insurance Company: [employer] - Phone [phone] Fax [phone]  Expected CoPay:       CoPay Card Available:      Foundation Assistance Needed:    Which Pharmacy is filling the prescription (Not needed for infusion/clinic administered): [id number] - [place], [place] - [address] [address]  Pharmacy Notified: Yes  Patient Notified: Comment:  Pharmacy will notify patient.

## 2022-11-01 NOTE — ADDENDUM NOTE
Encounter addended by: Yady Taylor RN on: 11/1/2022 1:40 PM   Actions taken: Charge Capture section accepted

## 2022-11-01 NOTE — ADDENDUM NOTE
Encounter addended by: Van Christianson on: 11/1/2022 11:52 AM   Actions taken: Charge Capture section accepted

## 2022-11-02 ENCOUNTER — HOSPITAL ENCOUNTER (OUTPATIENT)
Dept: RESEARCH | Facility: CLINIC | Age: 57
Discharge: HOME | End: 2022-11-02
Attending: PEDIATRICS
Payer: COMMERCIAL

## 2022-11-02 ENCOUNTER — LAB REQUISITION (OUTPATIENT)
Dept: LAB | Facility: CLINIC | Age: 57
End: 2022-11-02

## 2022-11-02 DIAGNOSIS — K86.81 EXOCRINE PANCREATIC INSUFFICIENCY: Primary | ICD-10-CM

## 2022-11-02 PROCEDURE — 510N000009 HC RESEARCH FACILITY, PER 15 MIN

## 2022-11-02 PROCEDURE — 82542 COL CHROMOTOGRAPHY QUAL/QUAN: CPT | Performed by: PEDIATRICS

## 2022-11-02 PROCEDURE — 510N000014 HC RESEARCH GFR, PER GFR

## 2022-11-02 PROCEDURE — 510N000016 HC RESEARCH MEALS, PER MEAL

## 2022-11-03 ENCOUNTER — THERAPY VISIT (OUTPATIENT)
Dept: PHYSICAL THERAPY | Facility: CLINIC | Age: 57
End: 2022-11-03
Payer: COMMERCIAL

## 2022-11-03 DIAGNOSIS — M62.89 PELVIC FLOOR DYSFUNCTION: Primary | ICD-10-CM

## 2022-11-03 DIAGNOSIS — K86.89 PANCREATIC INSUFFICIENCY: ICD-10-CM

## 2022-11-03 PROCEDURE — 97140 MANUAL THERAPY 1/> REGIONS: CPT | Mod: GP | Performed by: PHYSICAL THERAPIST

## 2022-11-03 PROCEDURE — 97535 SELF CARE MNGMENT TRAINING: CPT | Mod: GP | Performed by: PHYSICAL THERAPIST

## 2022-11-03 NOTE — PROGRESS NOTES
PROGRESS  REPORT    Progress reporting period is from 7/27/22 to 11/3/22.       SUBJECTIVE   Subjective: pt reports she has cut ties with her shelter, getting dry needling in her neck and that has been extremely helpful. Had a kidney stone and needed a stent placed. Pt was approved for insulin pump. Feels as though her stress levels are better and things have been improving.     Changes in function:  Yes (See Goal flowsheet attached for changes in current functional level)  Adverse reaction to treatment or activity: None    OBJECTIVE  Changes noted in objective findings:  Yes, improved urinary leaking since last visit   Objective: improved leaking today, increased tension on exam, review of exercises     ASSESSMENT/PLAN  Updated problem list and treatment plan: Diagnosis 1:  Urinary incontinence and pain  Pain -  self management, education and home program  Decreased strength - therapeutic exercise, therapeutic activities and home program  Impaired muscle performance - neuro re-education and home program  Decreased function - therapeutic activities and home program  STG/LTGs have been met or progress has been made towards goals:  Yes (See Goal flow sheet completed today.)  Assessment of Progress: The patient's condition is improving.  Self Management Plans:  Patient has been instructed in a home treatment program.  I have re-evaluated this patient and find that the nature, scope, duration and intensity of the therapy is appropriate for the medical condition of the patient.  Lynn continues to require the following intervention to meet STG and LTG's:  PT    Recommendations:  This patient would benefit from continued therapy.     Frequency:  1 X week, once daily  Duration:  for 6 weeks        Please refer to the daily flowsheet for treatment today, total treatment time and time spent performing 1:1 timed codes.

## 2022-11-03 NOTE — PROGRESS NOTES
[facility]    OUTPATIENT Physical Therapy ORTHOPEDIC EVALUATION  PLAN OF TREATMENT FOR OUTPATIENT REHABILITATION  (COMPLETE FOR INITIAL CLAIMS ONLY)  Patient's Last Name, First Name, M.I.  YOB: 1965  Lynn Thompson    Provider s Name:  [facility]   Medical Record No.  [id number]   Start of Care Date:  07/27/22   Onset Date:   07/07/22 (MD visit)   Treatment Diagnosis:  dysparunia, mixed UI and fecal incontinence Medical Diagnosis:  Pelvic floor dysfunction       Goals:     11/03/22 0700   Urinary Leakage   Previous Functional Level No problems   Current Functional Level Number of urinary leakage episodes in a day   Performance Level 1-2x per week   STG Target Performance Decrease urinary leakage episodes in a week to   Performance Level 1   Rationale continence throughout the day   Due Date 08/24/22   Date Goal Met 11/03/22   If goal not met, Why? making improvements   LTG Target Performance Decrease urinary leakage episodes in a month to   Performance Level 1   Rationale continence throughout the day   Due Date 12/29/22   Pelvic Pain   Previous Functional Level No restrictions   Current Functional Level Decreased frequency of intercourse due to pain   Performance Level 2/10 pain at start of intercourse   STG Target Performance Decrease pelvic pain to allow usage of dilator   Rationale painfree intercourse   Due Date 08/24/22   Date Goal Met 09/07/22   LTG Target Performance Decrease level of pelvic pain with intercourse to   Performance Level 1/10   Rationale painfree intercourse   Due Date 12/01/22   Fecal Incontinence   Previous Functional Level No issues   Current Functional Level Incontinence with  (nighttime)   Performance Level once per month   STG Target Performance Decrease number of episodes to   Performance Level 0 in two months   Rationale continence  throughout the night;for healthy hygiene and to prevent skin breakdown   Due Date 12/01/22       Therapy Frequency:  once per week  Predicted Duration of Therapy Intervention:  8 weeks    Claudia Queen, PT                 I CERTIFY THE NEED FOR THESE SERVICES FURNISHED UNDER        THIS PLAN OF TREATMENT AND WHILE UNDER MY CARE     (Physician attestation of this document indicates review and certification of the therapy plan).                     Certification Date From:  10/25/22   Certification Date To:  01/21/23    Referring Provider:  Nicole Rivera    Initial Assessment        See Epic Evaluation SOC Date: 07/27/22

## 2022-11-04 ENCOUNTER — IMMUNIZATION (OUTPATIENT)
Dept: FAMILY MEDICINE | Facility: CLINIC | Age: 57
End: 2022-11-04
Payer: MEDICARE

## 2022-11-04 PROCEDURE — 90682 RIV4 VACC RECOMBINANT DNA IM: CPT

## 2022-11-04 PROCEDURE — G0008 ADMIN INFLUENZA VIRUS VAC: HCPCS

## 2022-11-04 ASSESSMENT — PATIENT HEALTH QUESTIONNAIRE - PHQ9: SUM OF ALL RESPONSES TO PHQ QUESTIONS 1-9: 5

## 2022-11-08 LAB
BSA: 1.56 M2
COEFF DETERMINATION: 1 %
IOHEXOL CL UR+SERPL-VRATE: 10.69 MG/DL
IOHEXOL CL UR+SERPL-VRATE: 11.75 MG/DL
IOHEXOL CL UR+SERPL-VRATE: 59 ML/MIN
IOHEXOL CL UR+SERPL-VRATE: 66 /1.73 M2
IOHEXOL CL UR+SERPL-VRATE: 7.53 MG/DL
IOHEXOL CL UR+SERPL-VRATE: 8.55 MG/DL
IOHEXOL CL UR+SERPL-VRATE: 9.49 MG/DL

## 2022-11-09 ENCOUNTER — ALLIED HEALTH/NURSE VISIT (OUTPATIENT)
Dept: EDUCATION SERVICES | Facility: CLINIC | Age: 57
End: 2022-11-09
Payer: MEDICARE

## 2022-11-09 DIAGNOSIS — E89.1 POST-PANCREATECTOMY DIABETES (H): Primary | ICD-10-CM

## 2022-11-09 DIAGNOSIS — Z90.410 POST-PANCREATECTOMY DIABETES (H): Primary | ICD-10-CM

## 2022-11-09 DIAGNOSIS — E13.9 POST-PANCREATECTOMY DIABETES (H): Primary | ICD-10-CM

## 2022-11-09 PROCEDURE — 99207 PR DIABETES SELF MANAGEMENT: CPT

## 2022-11-09 RX ORDER — INSULIN ASPART INJECTION 100 [IU]/ML
15 INJECTION, SOLUTION SUBCUTANEOUS DAILY
Qty: 90 ML | Refills: 1 | Status: SHIPPED | OUTPATIENT
Start: 2022-11-09 | End: 2023-12-08

## 2022-11-09 NOTE — PROGRESS NOTES
Diabetes Self Management Training: Insulin Pump Start    SUBJECTIVE:  Lynn Thompson presents for an insulin pump start.   Pump Type: Omnipod 5    OBJECTIVE:  Vitals: There were no vitals taken for this visit.    Blood glucose before pump start: 145 mg/dL   Blood glucose after pump start:  127  mg/dL  Last basal insulin given at 5pm    Last bolus insulin given at 11pm            ASSESSMENT:    Homework completed: Completed online training and Reviewed user guide  EDUCATION PROVIDED:  Training for the  Omnipod 5 done in accordance with the company training checklist.  Explained how the hybrid closed loop (HCL) function works:  Basal modulation, delivery of auto-corrections and suspension of insulin to keep glucose levels at/near target blood glucose. .  Linked CGM with the pump.   Paired the pump with our [employer] account        Problem Solving:   Unexplained high blood glucoses on an insulin pump  Managing hypoglycemia on an insulin pump  Alerts, alarms and when to call the company help line    when to order supplies  how to order supplies    PUMP SETTINGS:  In Control IQ, the Target BG is 110.  Start time Basal Rate U/hr Correction Factor Insulin to Carb Ratio Target BG    Midnight .25 140 37 110 correct above 120                               Active Insulin Time: 3  (In Control IQ, Active Insulin Time is defaulted to 5 hours)  Maximum Bolus: 6  Reservoir Warning: 10 units    PLAN AND FOLLOW-UP:  Patient instructed to contact educator the day after the pump start if she is having any issues.  CDE will check on patient in 1-2 days and follow up visit on 11/21/22 @ 11am virtual    Time Spent:  Visit Type: Individual 120  Scanned documents: Insulin pump initiation settings with MD signature, Insulin pump start checklist.

## 2022-11-10 ENCOUNTER — THERAPY VISIT (OUTPATIENT)
Dept: PHYSICAL THERAPY | Facility: CLINIC | Age: 57
End: 2022-11-10
Payer: COMMERCIAL

## 2022-11-10 DIAGNOSIS — M62.89 PELVIC FLOOR DYSFUNCTION: Primary | ICD-10-CM

## 2022-11-10 PROCEDURE — 97535 SELF CARE MNGMENT TRAINING: CPT | Mod: GP | Performed by: PHYSICAL THERAPIST

## 2022-11-10 PROCEDURE — 97110 THERAPEUTIC EXERCISES: CPT | Mod: GP | Performed by: PHYSICAL THERAPIST

## 2022-11-10 PROCEDURE — 97140 MANUAL THERAPY 1/> REGIONS: CPT | Mod: GP | Performed by: PHYSICAL THERAPIST

## 2022-11-11 ENCOUNTER — TELEPHONE (OUTPATIENT)
Dept: FAMILY MEDICINE | Facility: CLINIC | Age: 57
End: 2022-11-11

## 2022-11-11 ENCOUNTER — MYC MEDICAL ADVICE (OUTPATIENT)
Dept: EDUCATION SERVICES | Facility: CLINIC | Age: 57
End: 2022-11-11

## 2022-11-11 ENCOUNTER — VIRTUAL VISIT (OUTPATIENT)
Dept: FAMILY MEDICINE | Facility: CLINIC | Age: 57
End: 2022-11-11
Payer: COMMERCIAL

## 2022-11-11 DIAGNOSIS — M25.531 RIGHT WRIST PAIN: Primary | ICD-10-CM

## 2022-11-11 DIAGNOSIS — R29.898 WEAKNESS OF RIGHT HAND: ICD-10-CM

## 2022-11-11 DIAGNOSIS — G56.01 CARPAL TUNNEL SYNDROME OF RIGHT WRIST: ICD-10-CM

## 2022-11-11 DIAGNOSIS — M79.644 THUMB PAIN, RIGHT: ICD-10-CM

## 2022-11-11 DIAGNOSIS — M25.521 RIGHT ELBOW PAIN: ICD-10-CM

## 2022-11-11 PROCEDURE — 99213 OFFICE O/P EST LOW 20 MIN: CPT | Mod: 95 | Performed by: PHYSICIAN ASSISTANT

## 2022-11-11 NOTE — PROGRESS NOTES
Lynn is a 57 year old who is being evaluated via a billable video visit.      How would you like to obtain your AVS? MyChart  If the video visit is dropped, the invitation should be resent by: Text to cell phone: [phone]  Will anyone else be joining your video visit? No    Assessment & Plan     ASSESSMENT/PLAN:      ICD-10-CM    1. Right wrist pain  M25.531 Orthopedic  Referral     Miscellaneous Order for DME - ONLY FOR DME      2. Right elbow pain  M25.521 Orthopedic  Referral     Miscellaneous Order for DME - ONLY FOR DME      3. Carpal tunnel syndrome of right wrist  G56.01 Orthopedic  Referral     Miscellaneous Order for DME - ONLY FOR DME      4. Weakness of right hand  R29.898 Orthopedic  Referral     Miscellaneous Order for DME - ONLY FOR DME      5. Thumb pain, right  M79.644 Orthopedic  Referral     Miscellaneous Order for DME - ONLY FOR DME        Has some elements of carpal tunnel syndrome, epicondylitis, and dequervains. Suspect whole wrist/thumb/elbow is inflamed/irritated.    Patient Instructions   Contact your regular clinic to  thumb spica wrist brace and tennis elbow band  Wear wrist brace day/night for the next couple weeks. Wear elbow band daytime  Stretches (see handout), ice if sore  Schedule follow up with hand specialist particularly due to weakness/worsening symptoms     Return in about 1 week (around 11/18/2022) for with specialist.    PIERRE Pate [facility] JERALD Bailey is a 57 year old, presenting for the following health issues:  No chief complaint on file.      Musculoskeletal Problem    History of Present Illness       Reason for visit:  Right wrist and elbow pain and weakness  Symptom onset:  More than a month  Symptoms include:  Wrist and elbow pain and weakness  Symptom intensity:  Moderate  Symptom progression:  Worsening  Had these symptoms before:  Yes  Has tried/received  treatment for these symptoms:  No  What makes it worse:  Using it  What makes it better:  Not using it    She eats 4 or more servings of fruits and vegetables daily.She consumes 0 sweetened beverage(s) daily.She exercises with enough effort to increase her heart rate 10 to 19 minutes per day.  She exercises with enough effort to increase her heart rate 4 days per week.   She is taking medications regularly.     Has been bothering her for a while, but no injury. 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  Trouble opening lids, painful to move thumb, she has been dropping things for the past two weeks.  Tingling yesterday in her radial side fingers.  Elbow is achey all the time.  She has been taking care of heavy disabled dog - lifting, diapering.      Review of Systems   Other than noted above, general, HEENT, respiratory, cardiac, MS, and gastrointestinal systems are negative.       Objective           Vitals:  No vitals were obtained today due to virtual visit.    Physical Exam   GENERAL: Healthy, alert and no distress  EYES: Eyes grossly normal to inspection.  No discharge or erythema, or obvious scleral/conjunctival abnormalities.  RESP: No audible wheeze, cough, or visible cyanosis.  No visible retractions or increased work of breathing.    SKIN: Visible skin clear. No significant rash, abnormal pigmentation or lesions.  NEURO: Cranial nerves grossly intact.  Mentation and speech appropriate for age.  PSYCH: Mentation appears normal, affect normal/bright, judgement and insight intact, normal speech and appearance well-groomed.    Patient performed her own Phalen test with pain reproduced in right fingers. Full ROM wrist and elbow but painful           Video-Visit Details    Video Start Time: 1:16 PM    Type of service:  Video Visit    Video End Time:1:33 PM    Originating Location (pt. Location): Home    Distant Location (provider location):  Off site    Platform used for Video Visit:  AmWell

## 2022-11-11 NOTE — TELEPHONE ENCOUNTER
Please help pt with this     Katelin set what we have in the packers station     Sarika Osullivan RN

## 2022-11-11 NOTE — TELEPHONE ENCOUNTER
Patient calling stating she had a virtual appointment with a provider in [place] and the provider recommended bracing and wanted patient to check with her clinic to see if they have them in the clinic for patient to .        Per visit note:  Contact your regular clinic to  thumb spica wrist brace and tennis elbow band

## 2022-11-11 NOTE — TELEPHONE ENCOUNTER
Pump settings adjusted with patient via phone.    Reverse correction turned off  Target changed from 110 to 120  Correct above changed from 120 to 140  Reminded patient to call with questions or concerns    Follow up with Hannah Shook RN [facility] on 11/17 at 11:00.    Rachel Izaguirre, SATISH Diabetes Educator  Diabetes Education Department  [facility], [facility]  [phone]

## 2022-11-11 NOTE — TELEPHONE ENCOUNTER
DME Arm band given to pt.  We did not have the wrist brace with thumb spika. DME order printed and given to pt to bring to DME location or to get online and submit to insurance.

## 2022-11-11 NOTE — PATIENT INSTRUCTIONS
Contact your regular clinic to  thumb spica wrist brace and tennis elbow band  Wear wrist brace day/night for the next couple weeks. Wear elbow band daytime  Stretches (see handout), ice if sore  Schedule follow up with hand specialist particularly due to weakness/worsening symptoms

## 2022-11-17 ENCOUNTER — OFFICE VISIT (OUTPATIENT)
Dept: ORTHOPEDICS | Facility: CLINIC | Age: 57
End: 2022-11-17
Attending: PHYSICIAN ASSISTANT
Payer: COMMERCIAL

## 2022-11-17 ENCOUNTER — VIRTUAL VISIT (OUTPATIENT)
Dept: EDUCATION SERVICES | Facility: CLINIC | Age: 57
End: 2022-11-17
Payer: MEDICARE

## 2022-11-17 VITALS — HEIGHT: 64 IN | DIASTOLIC BLOOD PRESSURE: 64 MMHG | SYSTOLIC BLOOD PRESSURE: 112 MMHG | BODY MASS INDEX: 18.88 KG/M2

## 2022-11-17 DIAGNOSIS — M77.8 TENDINITIS OF RIGHT FOREARM: Primary | ICD-10-CM

## 2022-11-17 DIAGNOSIS — Z90.410 POST-PANCREATECTOMY DIABETES (H): Primary | ICD-10-CM

## 2022-11-17 DIAGNOSIS — M25.531 RIGHT WRIST PAIN: ICD-10-CM

## 2022-11-17 DIAGNOSIS — M77.11 LATERAL EPICONDYLITIS OF RIGHT ELBOW: ICD-10-CM

## 2022-11-17 DIAGNOSIS — E89.1 POST-PANCREATECTOMY DIABETES (H): Primary | ICD-10-CM

## 2022-11-17 DIAGNOSIS — M25.521 RIGHT ELBOW PAIN: ICD-10-CM

## 2022-11-17 DIAGNOSIS — R29.898 WEAKNESS OF RIGHT HAND: ICD-10-CM

## 2022-11-17 DIAGNOSIS — G56.01 CARPAL TUNNEL SYNDROME OF RIGHT WRIST: ICD-10-CM

## 2022-11-17 DIAGNOSIS — E13.9 POST-PANCREATECTOMY DIABETES (H): Primary | ICD-10-CM

## 2022-11-17 DIAGNOSIS — M79.644 THUMB PAIN, RIGHT: ICD-10-CM

## 2022-11-17 PROCEDURE — 99207 PR DIABETES SELF MANAGEMENT: CPT | Mod: 95

## 2022-11-17 PROCEDURE — 99203 OFFICE O/P NEW LOW 30 MIN: CPT | Performed by: ORTHOPAEDIC SURGERY

## 2022-11-17 NOTE — PROGRESS NOTES
Okay I will getting some thoughts today's labs do have some clots clot HISTORY OF PRESENT ILLNESS:    Lynn Thompson is a 57 year old female who is seen in consultation at the request of Dr. Eaton for right elbow, forearm, and wrist pain and weakness. Patient reports onset of symptoms chronic, with worsening symptoms over the last 2 months.  She is right hand dominant.   Present symptoms: Patient reports numbness and tingling of the index-little fingers that is intermittent. She reports most often noticeable in the morning. She also notes progressive worsening pain, of the wrist with radiation to the elbow. She reports pain along the volar aspect. Painful with twisting, pinching, and side to side movements. Painful with drying her hair.  Weakness present, and has noticed increased frequency with dropping items.   Diabetic Hgb A1c: 8.2, dated 5/8/22  Current pain level: 5/10  Treatments tried to this point: counterforce strap, cock-up thumb spica splint, Tylenol for severe pain.   Patient unable to take NSAIDs.  Orthopedic PMH: right wrist fracture in adolescence     Past Medical History:   Diagnosis Date     Benign paroxysmal positional vertigo     occ.      Calculus of kidney 05/2005    x1 on L side passed, several stones.  Has been tested for oxalate.     Chronic abdominal pain 07/17/2013     Chronic pancreatitis 07/17/2013     Depression     also occ panic spells     Diabetes (H) 5/10/2013    Total Pancreatomy with Auto Islets Transplant     Dyspepsia 06/1999    H. 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 Presley      SECTION       CHOLECYSTECTOMY       COLONOSCOPY N/A 2021    Procedure: COLONOSCOPY INCOMPLETE Aborted due to incomplete prep  will need to take additional prep and return tomorrow 21;  Surgeon: Ihsan Saenz MD;  Location: [facility]     COMBINED CYSTOSCOPY, RETROGRADES, URETEROSCOPY, LASER HOLMIUM LITHOTRIPSY URETER(S), INSERT STENT Right 2015    Procedure: COMBINED CYSTOSCOPY, RETROGRADES, URETEROSCOPY, LASER HOLMIUM LITHOTRIPSY URETER(S), INSERT STENT;  Surgeon: Kennedi Aldana MD;  Location: [facility]     COMBINED CYSTOSCOPY, RETROGRADES, URETEROSCOPY, LASER HOLMIUM LITHOTRIPSY URETER(S), INSERT STENT Right 2015    Procedure: COMBINED CYSTOSCOPY, RETROGRADES, URETEROSCOPY, LASER HOLMIUM LITHOTRIPSY URETER(S), INSERT STENT;  Surgeon: Kennedi Aldana MD;  [facility]: [facility]     COSMETIC SURGERY  2002    Tummy tuck     CYSTECTOMY OVARIAN BENIGN Right      CYSTOSCOPY, RETROGRADES, INSERT STENT URETER(S), COMBINED  10/02/2012    Procedure: COMBINED CYSTOSCOPY, RETROGRADES, INSERT STENT URETER(S);  COMBINED CYSTOSCOPY,  , INSERT LEFT STENT URETER;  Surgeon: Johny Baez MD;  Location: [facility]     CYSTOSCOPY, RETROGRADES, INSERT STENT URETER(S), COMBINED Left 2022    Procedure: Cystoscopy with left retrograde pyelogram, left ureteroscopy, thulium laser lithotripsy of left ureteral calculus, stone basketing and left ureteral stent insertion;  Surgeon: Alonzo Rome MD;  Location: [facility]     ESOPHAGOSCOPY, GASTROSCOPY, DUODENOSCOPY (EGD), COMBINED N/A 2018    Procedure: COMBINED ESOPHAGOSCOPY, GASTROSCOPY, DUODENOSCOPY (EGD);  ESOPHAGOSCOPY, GASTROSCOPY, DUODENOSCOPY (EGD)    ;  Surgeon: Tamir Rodgers MD;  Location: [facility]     EXTRACORPOREAL SHOCK WAVE LITHOTRIPSY (ESWL)  10/16/2012     Procedure: EXTRACORPOREAL SHOCK WAVE LITHOTRIPSY (ESWL);  left EXTRACORPOREAL SHOCK WAVE LITHOTRIPSY (ESWL) ;  Surgeon: Johny Baez MD;  Location: [facility]     Gastric bypass NOS       HERNIA REPAIR  02/2015     HYSTERECTOMY SUPRACERVICAL, BILATERAL SALPINGO-OOPHORECTOMY, COMBINED N/A 02/01/2022    Procedure: Abdominal supracervical hysterectomy, bilateral salpingooophrectomy;  Surgeon: Nicole Rivera MD;  [facility]     IRRIGATION AND DEBRIDEMENT HAND, COMBINED Left 10/30/2020    Procedure: Left hand sharp excisional debridement of skin, subcutaneous tissue and fat with a scalpel, 2 x 1 x 1 cm.;  Surgeon: Demian Renteria MD;  Location: [facility]     LAPAROSCOPIC LYSIS ADHESIONS N/A 02/20/2015    Procedure: LAPAROSCOPIC LYSIS ADHESIONS;  Surgeon: Aaron Early MD;  [facility]     LAPAROSCOPIC LYSIS ADHESIONS N/A 12/29/2015    Procedure: LAPAROSCOPIC LYSIS ADHESIONS;  Surgeon: Aaron Early MD;  [facility]     PANCREATECTOMY, TRANSPLANT AUTO ISLET CELL, COMBINED  05/10/2013    Procedure: COMBINED PANCREATECTOMY, TRANSPLANT AUTO ISLET CELL;  Pancreatectomy, Auto Islet Cell Transplant   hernia repair, jejunostomy tube and liver biopsies with Anesthesia General with block;  Surgeon: Aaron Early MD;  [facility]     Partial ileum resection  1992     RECTOPEXY ABDOMINAL N/A 02/01/2022    Procedure: RECTOPEXY, ABDOMINAL;  Surgeon: Uriah Sheridan MD;  [facility]     REPAIR PTOSIS BROW BILATERAL Bilateral 06/09/2020    Procedure: BILATERAL BROW PTOSIS REPAIR;  Surgeon: Denise Alberts MD;  Location: [facility]     SACROCOLPOPEXY, CYSTOSCOPY, COMBINED N/A 02/01/2022    Procedure: Uterosacral ligament suspension, pina colposuspension with Cystoscopy;  Surgeon: Nicole Rivera MD;  [facility]     SIGMOIDOSCOPY FLEXIBLE N/A 02/01/2022    Procedure: SIGMOIDOSCOPY, FLEXIBLE;  Surgeon: Uriah Sheridan MD;  [facility]     Surgery for SBO  2015      TONSILLECTOMY, ADENOIDECTOMY, COMBINED  1997     TRANSPLANT  5/10/13    Pancreatic Auto-Islet Transplant       Family History   Problem Relation Age of Onset     Family History Negative Mother      Respiratory Father         COPD;  at 69     Genitourinary Problems Father         kidney stones     Substance Abuse Father      Depression Father      Asthma Father      Heart Disease Paternal Grandfather         M.I.     Coronary Artery Disease Paternal Grandfather      Hyperlipidemia Paternal Grandfather      Genitourinary Problems Brother         multiple brothers with kidney stones     Gastrointestinal Disease Maternal Grandmother         undiagnosed 'gut' issues     Coronary Artery Disease Maternal Grandfather      Hyperlipidemia Maternal Grandfather      Cerebrovascular Disease Paternal Grandmother         At the age of 103     Anxiety Disorder Paternal Grandmother      Osteoporosis Paternal Grandmother      Anxiety Disorder Son      Anxiety Disorder Daughter      Asthma Daughter      Colon Cancer No family hx of        Social History     Socioeconomic History     Marital status:      Spouse name: Tera     Number of children: 2     Years of education: Not on file     Highest education level: Some college, no degree   Occupational History     Occupation: [employer] service     Employer: [employer]   Tobacco Use     Smoking status: Never     Smokeless tobacco: Never   Vaping Use     Vaping Use: Never used   Substance and Sexual Activity     Alcohol use: Not Currently     Drug use: Not Currently     Sexual activity: Yes     Partners: Male     Birth control/protection: Post-menopausal   Other Topics Concern     Parent/sibling w/ CABG, MI or angioplasty before 65F 55M? No   Social History Narrative     Not on file     Social Determinants of Health     Financial Resource Strain: Not on file   Food Insecurity: Not on file   Transportation Needs: Not on file   Physical Activity: Not on file   Stress: Not on file    Social Connections: Not on file   Intimate Partner Violence: Not At Risk     Fear of Current or Ex-Partner: No     Emotionally Abused: No     Physically Abused: No     Sexually Abused: No   Housing Stability: Not on file       Current Outpatient Medications   Medication Sig Dispense Refill     calcium carbonate 600 mg-vitamin D 400 units (CALTRATE) 600-400 MG-UNIT per tablet Take 1 tablet by mouth daily       DULoxetine (CYMBALTA) 20 MG capsule Take 20 mg by mouth daily       escitalopram (LEXAPRO) 20 MG tablet Take 20 mg by mouth daily       estradiol (ESTRACE) 0.1 MG/GM vaginal cream PLACE 2 GRAMS VAGINALLY 2 TIMES WEEKLY 42.5 g 1     famotidine (PEPCID) 40 MG tablet Take 1 tablet (40 mg) by mouth 2 times daily as needed for heartburn 180 tablet 1     FIASP PENFILL 100 UNIT/ML SOCT Inject 0.5-4 Units Subcutaneous 4 times daily, INJECT with meals and nightly 15 mL 0     gabapentin (NEURONTIN) 300 MG capsule Take 300 mg by mouth nightly as needed       Glucagon (GVOKE HYPOPEN 1-PACK) 1 MG/0.2ML SOAJ Inject 1 mg Subcutaneous once as needed (for hypoglycemia with loss of consciousness) 15 mL 5     glucose 40 % GEL Take 15-30 g by mouth every 15 minutes as needed. 1 Tube 11     hydrOXYzine (ATARAX) 25 MG tablet TAKE 1-2 TABLETS BY MOUTH 2 TIMES DAILY AS NEEDED FOR ANXIETY  0     Insulin Aspart, w/Niacinamide, (FIASP) 100 UNIT/ML SOLN 15 Units by Subcutaneous Infusion route daily Up to 85 units in continuous insulin pump 90 mL 1     Insulin Disposable Pump (OMNIPOD 5 G6 INTRO, GEN 5,) KIT 1 each continuous 1 kit 0     Insulin Disposable Pump (OMNIPOD 5 G6 POD, GEN 5,) MISC 1 each every 3 days 30 each 5     insulin pen needle (32G X 4 MM) 32G X 4 MM miscellaneous Use 4-6 pen needles daily to inject subcutaneous insulin 200 each 11     levothyroxine (SYNTHROID/LEVOTHROID) 100 MCG tablet Take 1 tablet (100 mcg) by mouth daily 90 tablet 0     lipase-protease-amylase (VIOKACE) 18080-97817 units TABS tablet Take 5 with  meals and 3 with snacks; approximately daily maximum of 20 capsules 500 tablet 11     loratadine (CLARITIN) 10 MG tablet Take 10 mg by mouth daily as needed        modafinil (PROVIGIL) 200 MG tablet Take 400 mg by mouth daily       omeprazole (PRILOSEC) 40 MG DR capsule Take 1 capsule (40 mg) by mouth 2 times daily Take 30-60 minutes before a meal. 180 capsule 0     traZODone (DESYREL) 50 MG tablet Take 50 mg by mouth nightly as needed for sleep         Allergies   Allergen Reactions     Corticosteroids Other (See Comments)     All oral, IV and injectable steroids are contraindicated (unless in life threatening situations) in Islet Auto transplant recipients. They can cause irreversible loss of islet cell function. Please contact patient's transplant care coordinator, Erlinda Multani RN BSN at [phone]/pager: [phone] and endocrinologist prior to administration.       Povidone Iodine Hives     Causes skin to blister     Nsaids      naprosyn = GI upset     Sulfasalazine Nausea and Nausea and Vomiting       REVIEW OF SYSTEMS:  CONSTITUTIONAL:  NEGATIVE for fever, chills, change in weight  INTEGUMENTARY/SKIN:  NEGATIVE for worrisome rashes, moles or lesions  EYES:  NEGATIVE for vision changes or irritation  ENT/MOUTH:  NEGATIVE for ear, mouth and throat problems  RESP:  NEGATIVE for significant cough or SOB  BREAST:  NEGATIVE for masses, tenderness or discharge  CV:  NEGATIVE for chest pain, palpitations or peripheral edema  GI:  NEGATIVE for nausea, abdominal pain, heartburn, or change in bowel habits  :  Negative   MUSCULOSKELETAL:  See HPI above  NEURO:  NEGATIVE for weakness, dizziness or paresthesias  ENDOCRINE:  NEGATIVE for temperature intolerance, skin/hair changes  HEME/ALLERGY/IMMUNE:  NEGATIVE for bleeding problems  PSYCHIATRIC:  NEGATIVE for changes in mood or affect      PHYSICAL EXAM:  LMP 12/19/2013   There is no height or weight on file to calculate BMI.   GENERAL APPEARANCE: healthy, alert  and no distress   HEENT: No apparent thyroid megaly. Clear sclera with normal ocular movement  RESPIRATORY: No labored breathing  SKIN: no suspicious lesions or rashes  NEURO: Normal strength and tone, mentation intact and speech normal  VASCULAR: Good pulses, and capillary refill   LYMPH: no lymphadenopathy   PSYCH:  mentation appears normal and affect normal/bright    MUSCULOSKELETAL:  Not in acute distress  Normal gait    Symmetrical normal-appearing upper extremities, bilateral  Full range of motion of shoulders, elbows and wrists    On the right side specifically, no erythema or focal swelling noted  Mild discomfort with rotational movement of the wrist    Gross  strength is intact  Localized tenderness at the lateral epicondyle and proximal extensor supinator muscle group    Distal forearm is slightly tender as well    Resisted dorsiflexion and volar flexion increases the pain on both sides of the forearm    No tremor or fasciculation noted  Gross sensation is intact  Circulation is intact       ASSESSMENT:  Forearm tendinitis, right  Lateral epicondylitis  Insulin-dependent diabetes    PLAN:  Her clinical situation is consistent with chronic forearm tendinitis and lateral epicondylitis.  There is no indication for surgical intervention of any kind.  Even though she does have intermittent paresthesias symptoms, it does not appear that compression neuropathy is the main feature of her issue.    From the fact that she has post pancreatectomy diabetes, her musculoskeletal system are prone to have chronic inflammatory changes.    She is also taking care of a 60 pound disabled dog.  Because of her daughter's disability, she has to lift the dog for diaper changes.  It is very likely that she is putting undue amount of stress to the upper extremities.    We talked about some other way of taking care of the dog so that she does not have to lift the dog is much as she is doing.    Once that she settles down by  using the forearm strap and a thumb spica splint over the next 2 weeks or so, she can start hand therapy for modalities and strengthening.    Icing 3-4 times a day is also recommended.  Follow-up as needed otherwise.          Imaging Interpretation:   None taken today      Nikunj Plummer MD  Department of Orthopedic Surgery        Disclaimer: This note consists of symbols derived from keyboarding, dictation and/or voice recognition software. As a result, there may be errors in the script that have gone undetected. Please consider this when interpreting information found in this chart.

## 2022-11-17 NOTE — LETTER
11/17/2022         RE: [place]  [address] [facility] [address]        Dear Colleague,    Thank you for referring your patient, Lynn Thompson, to the [facility] [place]. Please see a copy of my visit note below.    Okay I will getting some thoughts today's labs do have some clots clot HISTORY OF PRESENT ILLNESS:    Lynn Thompson is a 57 year old female who is seen in consultation at the request of Dr. Eaton for right elbow, forearm, and wrist pain and weakness. Patient reports onset of symptoms chronic, with worsening symptoms over the last 2 months.  She is right hand dominant.   Present symptoms: Patient reports numbness and tingling of the index-little fingers that is intermittent. She reports most often noticeable in the morning. She also notes progressive worsening pain, of the wrist with radiation to the elbow. She reports pain along the volar aspect. Painful with twisting, pinching, and side to side movements. Painful with drying her hair.  Weakness present, and has noticed increased frequency with dropping items.   Diabetic Hgb A1c: 8.2, dated 5/8/22  Current pain level: 5/10  Treatments tried to this point: counterforce strap, cock-up thumb spica splint, Tylenol for severe pain.   Patient unable to take NSAIDs.  Orthopedic PMH: right wrist fracture in adolescence     Past Medical History:   Diagnosis Date     Benign paroxysmal positional vertigo     occ.      Calculus of kidney 05/2005    x1 on L side passed, several stones.  Has been tested for oxalate.     Chronic abdominal pain 07/17/2013     Chronic pancreatitis 07/17/2013     Depression     also occ panic spells     Diabetes (H) 5/10/2013    Total Pancreatomy with Auto Islets Transplant     Dyspepsia 06/1999    H. pylori   treated     Headaches     still periodic HA's ;  often 5X/week     Hypertension 02/22/2016    Stress related     Iron deficiency anemia 11/2003    relates to gastric  bypass     Post-pancreatectomy diabetes melltius 2013     Sleep apnea     uses splint     Spasm of sphincter of Oddi     surgical + endoscopic stenting of pancreatic duct @ [facility] 06     Thyroid nodule 2016       Past Surgical History:   Procedure Laterality Date     ABDOMINOPLASTY  2002    Tummy tuck     APPENDECTOMY  1990     BUNIONECTOMY Right 1998     CBD Stent placement  2002    CBD stent; Dr. Presley      SECTION       CHOLECYSTECTOMY       COLONOSCOPY N/A 2021    Procedure: COLONOSCOPY INCOMPLETE Aborted due to incomplete prep  will need to take additional prep and return tomorrow 21;  Surgeon: Ihsan Saenz MD;  Location: [facility]     COMBINED CYSTOSCOPY, RETROGRADES, URETEROSCOPY, LASER HOLMIUM LITHOTRIPSY URETER(S), INSERT STENT Right 2015    Procedure: COMBINED CYSTOSCOPY, RETROGRADES, URETEROSCOPY, LASER HOLMIUM LITHOTRIPSY URETER(S), INSERT STENT;  Surgeon: Kennedi Aldana MD;  Location: [facility]     COMBINED CYSTOSCOPY, RETROGRADES, URETEROSCOPY, LASER HOLMIUM LITHOTRIPSY URETER(S), INSERT STENT Right 2015    Procedure: COMBINED CYSTOSCOPY, RETROGRADES, URETEROSCOPY, LASER HOLMIUM LITHOTRIPSY URETER(S), INSERT STENT;  Surgeon: Kennedi Aldana MD;  [facility]: [facility]     COSMETIC SURGERY  2002    Tummy tuck     CYSTECTOMY OVARIAN BENIGN Right      CYSTOSCOPY, RETROGRADES, INSERT STENT URETER(S), COMBINED  10/02/2012    Procedure: COMBINED CYSTOSCOPY, RETROGRADES, INSERT STENT URETER(S);  COMBINED CYSTOSCOPY,  , INSERT LEFT STENT URETER;  Surgeon: Johny Baez MD;  Location: [facility]     CYSTOSCOPY, RETROGRADES, INSERT STENT URETER(S), COMBINED Left 2022    Procedure: Cystoscopy with left retrograde pyelogram, left ureteroscopy, thulium laser lithotripsy of left ureteral calculus, stone basketing and left ureteral stent insertion;  Surgeon: Alonzo Rome MD;  Location: [facility]     ESOPHAGOSCOPY, GASTROSCOPY,  DUODENOSCOPY (EGD), COMBINED N/A 01/24/2018    Procedure: COMBINED ESOPHAGOSCOPY, GASTROSCOPY, DUODENOSCOPY (EGD);  ESOPHAGOSCOPY, GASTROSCOPY, DUODENOSCOPY (EGD)    ;  Surgeon: Tamir Rodgers MD;  Location: [facility]     EXTRACORPOREAL SHOCK WAVE LITHOTRIPSY (ESWL)  10/16/2012    Procedure: EXTRACORPOREAL SHOCK WAVE LITHOTRIPSY (ESWL);  left EXTRACORPOREAL SHOCK WAVE LITHOTRIPSY (ESWL) ;  Surgeon: Johny Baez MD;  Location: [facility]     Gastric bypass NOS       HERNIA REPAIR  02/2015     HYSTERECTOMY SUPRACERVICAL, BILATERAL SALPINGO-OOPHORECTOMY, COMBINED N/A 02/01/2022    Procedure: Abdominal supracervical hysterectomy, bilateral salpingooophrectomy;  Surgeon: Nicole Rivera MD;  [facility]     IRRIGATION AND DEBRIDEMENT HAND, COMBINED Left 10/30/2020    Procedure: Left hand sharp excisional debridement of skin, subcutaneous tissue and fat with a scalpel, 2 x 1 x 1 cm.;  Surgeon: Demian Renteria MD;  Location: [facility]     LAPAROSCOPIC LYSIS ADHESIONS N/A 02/20/2015    Procedure: LAPAROSCOPIC LYSIS ADHESIONS;  Surgeon: Aaron Eraly MD;  [facility]     LAPAROSCOPIC LYSIS ADHESIONS N/A 12/29/2015    Procedure: LAPAROSCOPIC LYSIS ADHESIONS;  Surgeon: Aaron Early MD;  [facility]     PANCREATECTOMY, TRANSPLANT AUTO ISLET CELL, COMBINED  05/10/2013    Procedure: COMBINED PANCREATECTOMY, TRANSPLANT AUTO ISLET CELL;  Pancreatectomy, Auto Islet Cell Transplant   hernia repair, jejunostomy tube and liver biopsies with Anesthesia General with block;  Surgeon: Aaron Early MD;  [facility]     Partial ileum resection  1992     RECTOPEXY ABDOMINAL N/A 02/01/2022    Procedure: RECTOPEXY, ABDOMINAL;  Surgeon: Uriah Sheridan MD;  [facility]     REPAIR PTOSIS BROW BILATERAL Bilateral 06/09/2020    Procedure: BILATERAL BROW PTOSIS REPAIR;  Surgeon: Denise Alberts MD;  Location: [facility]     SACROCOLPOPEXY, CYSTOSCOPY, COMBINED N/A 02/01/2022    Procedure: Uterosacral  ligament suspension, pina colposuspension with Cystoscopy;  Surgeon: Nicole Rivera MD;  [facility]     SIGMOIDOSCOPY FLEXIBLE N/A 2022    Procedure: SIGMOIDOSCOPY, FLEXIBLE;  Surgeon: Uriah Sheridan MD;  [facility]     Surgery for SBO       TONSILLECTOMY, ADENOIDECTOMY, COMBINED  1997     TRANSPLANT  5/10/13    Pancreatic Auto-Islet Transplant       Family History   Problem Relation Age of Onset     Family History Negative Mother      Respiratory Father         COPD;  at 69     Genitourinary Problems Father         kidney stones     Substance Abuse Father      Depression Father      Asthma Father      Heart Disease Paternal Grandfather         M.I.     Coronary Artery Disease Paternal Grandfather      Hyperlipidemia Paternal Grandfather      Genitourinary Problems Brother         multiple brothers with kidney stones     Gastrointestinal Disease Maternal Grandmother         undiagnosed 'gut' issues     Coronary Artery Disease Maternal Grandfather      Hyperlipidemia Maternal Grandfather      Cerebrovascular Disease Paternal Grandmother         At the age of 103     Anxiety Disorder Paternal Grandmother      Osteoporosis Paternal Grandmother      Anxiety Disorder Son      Anxiety Disorder Daughter      Asthma Daughter      Colon Cancer No family hx of        Social History     Socioeconomic History     Marital status:      Spouse name: Tera     Number of children: 2     Years of education: Not on file     Highest education level: Some college, no degree   Occupational History     Occupation: customer service     Employer: [employer]   Tobacco Use     Smoking status: Never     Smokeless tobacco: Never   Vaping Use     Vaping Use: Never used   Substance and Sexual Activity     Alcohol use: Not Currently     Drug use: Not Currently     Sexual activity: Yes     Partners: Male     Birth control/protection: Post-menopausal   Other Topics Concern     Parent/sibling w/ CABG,  MI or angioplasty before 65F 55M? No   Social History Narrative     Not on file     Social Determinants of Health     Financial Resource Strain: Not on file   Food Insecurity: Not on file   Transportation Needs: Not on file   Physical Activity: Not on file   Stress: Not on file   Social Connections: Not on file   Intimate Partner Violence: Not At Risk     Fear of Current or Ex-Partner: No     Emotionally Abused: No     Physically Abused: No     Sexually Abused: No   Housing Stability: Not on file       Current Outpatient Medications   Medication Sig Dispense Refill     calcium carbonate 600 mg-vitamin D 400 units (CALTRATE) 600-400 MG-UNIT per tablet Take 1 tablet by mouth daily       DULoxetine (CYMBALTA) 20 MG capsule Take 20 mg by mouth daily       escitalopram (LEXAPRO) 20 MG tablet Take 20 mg by mouth daily       estradiol (ESTRACE) 0.1 MG/GM vaginal cream PLACE 2 GRAMS VAGINALLY 2 TIMES WEEKLY 42.5 g 1     famotidine (PEPCID) 40 MG tablet Take 1 tablet (40 mg) by mouth 2 times daily as needed for heartburn 180 tablet 1     FIASP PENFILL 100 UNIT/ML SOCT Inject 0.5-4 Units Subcutaneous 4 times daily, INJECT with meals and nightly 15 mL 0     gabapentin (NEURONTIN) 300 MG capsule Take 300 mg by mouth nightly as needed       Glucagon (GVOKE HYPOPEN 1-PACK) 1 MG/0.2ML SOAJ Inject 1 mg Subcutaneous once as needed (for hypoglycemia with loss of consciousness) 15 mL 5     glucose 40 % GEL Take 15-30 g by mouth every 15 minutes as needed. 1 Tube 11     hydrOXYzine (ATARAX) 25 MG tablet TAKE 1-2 TABLETS BY MOUTH 2 TIMES DAILY AS NEEDED FOR ANXIETY  0     Insulin Aspart, w/Niacinamide, (FIASP) 100 UNIT/ML SOLN 15 Units by Subcutaneous Infusion route daily Up to 85 units in continuous insulin pump 90 mL 1     Insulin Disposable Pump (OMNIPOD 5 G6 INTRO, GEN 5,) KIT 1 each continuous 1 kit 0     Insulin Disposable Pump (OMNIPOD 5 G6 POD, GEN 5,) MISC 1 each every 3 days 30 each 5     insulin pen needle (32G X 4 MM) 32G X  4 MM miscellaneous Use 4-6 pen needles daily to inject subcutaneous insulin 200 each 11     levothyroxine (SYNTHROID/LEVOTHROID) 100 MCG tablet Take 1 tablet (100 mcg) by mouth daily 90 tablet 0     lipase-protease-amylase (VIOKACE) 92729-52379 units TABS tablet Take 5 with meals and 3 with snacks; approximately daily maximum of 20 capsules 500 tablet 11     loratadine (CLARITIN) 10 MG tablet Take 10 mg by mouth daily as needed        modafinil (PROVIGIL) 200 MG tablet Take 400 mg by mouth daily       omeprazole (PRILOSEC) 40 MG DR capsule Take 1 capsule (40 mg) by mouth 2 times daily Take 30-60 minutes before a meal. 180 capsule 0     traZODone (DESYREL) 50 MG tablet Take 50 mg by mouth nightly as needed for sleep         Allergies   Allergen Reactions     Corticosteroids Other (See Comments)     All oral, IV and injectable steroids are contraindicated (unless in life threatening situations) in Islet Auto transplant recipients. They can cause irreversible loss of islet cell function. Please contact patient's transplant care coordinator, Erlinda Multani RN BSN at [phone]/pager: [phone] and endocrinologist prior to administration.       Povidone Iodine Hives     Causes skin to blister     Nsaids      naprosyn = GI upset     Sulfasalazine Nausea and Nausea and Vomiting       REVIEW OF SYSTEMS:  CONSTITUTIONAL:  NEGATIVE for fever, chills, change in weight  INTEGUMENTARY/SKIN:  NEGATIVE for worrisome rashes, moles or lesions  EYES:  NEGATIVE for vision changes or irritation  ENT/MOUTH:  NEGATIVE for ear, mouth and throat problems  RESP:  NEGATIVE for significant cough or SOB  BREAST:  NEGATIVE for masses, tenderness or discharge  CV:  NEGATIVE for chest pain, palpitations or peripheral edema  GI:  NEGATIVE for nausea, abdominal pain, heartburn, or change in bowel habits  :  Negative   MUSCULOSKELETAL:  See HPI above  NEURO:  NEGATIVE for weakness, dizziness or paresthesias  ENDOCRINE:  NEGATIVE for  temperature intolerance, skin/hair changes  HEME/ALLERGY/IMMUNE:  NEGATIVE for bleeding problems  PSYCHIATRIC:  NEGATIVE for changes in mood or affect      PHYSICAL EXAM:  LMP 12/19/2013   There is no height or weight on file to calculate BMI.   GENERAL APPEARANCE: healthy, alert and no distress   HEENT: No apparent thyroid megaly. Clear sclera with normal ocular movement  RESPIRATORY: No labored breathing  SKIN: no suspicious lesions or rashes  NEURO: Normal strength and tone, mentation intact and speech normal  VASCULAR: Good pulses, and capillary refill   LYMPH: no lymphadenopathy   PSYCH:  mentation appears normal and affect normal/bright    MUSCULOSKELETAL:  Not in acute distress  Normal gait    Symmetrical normal-appearing upper extremities, bilateral  Full range of motion of shoulders, elbows and wrists    On the right side specifically, no erythema or focal swelling noted  Mild discomfort with rotational movement of the wrist    Gross  strength is intact  Localized tenderness at the lateral epicondyle and proximal extensor supinator muscle group    Distal forearm is slightly tender as well    Resisted dorsiflexion and volar flexion increases the pain on both sides of the forearm    No tremor or fasciculation noted  Gross sensation is intact  Circulation is intact       ASSESSMENT:  Forearm tendinitis, right  Lateral epicondylitis  Insulin-dependent diabetes    PLAN:  Her clinical situation is consistent with chronic forearm tendinitis and lateral epicondylitis.  There is no indication for surgical intervention of any kind.  Even though she does have intermittent paresthesias symptoms, it does not appear that compression neuropathy is the main feature of her issue.    From the fact that she has post pancreatectomy diabetes, her musculoskeletal system are prone to have chronic inflammatory changes.    She is also taking care of a 60 pound disabled dog.  Because of her daughter's disability, she has to lift  the dog for diaper changes.  It is very likely that she is putting undue amount of stress to the upper extremities.    We talked about some other way of taking care of the dog so that she does not have to lift the dog is much as she is doing.    Once that she settles down by using the forearm strap and a thumb spica splint over the next 2 weeks or so, she can start hand therapy for modalities and strengthening.    Icing 3-4 times a day is also recommended.  Follow-up as needed otherwise.          Imaging Interpretation:   None taken today      Nikunj Plummer MD  Department of Orthopedic Surgery        Disclaimer: This note consists of symbols derived from keyboarding, dictation and/or voice recognition software. As a result, there may be errors in the script that have gone undetected. Please consider this when interpreting information found in this chart.        Again, thank you for allowing me to participate in the care of your patient.        Sincerely,        Nikunj Plummer MD

## 2022-11-17 NOTE — PROGRESS NOTES
Video-Visit Details    Type of service:  Video Visit  Patient consented to video visit  Video Start Time:  11  Video End Time:   1130  Originating Location (pt. 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    Patient is being treated with:  insulin pump  She is accompanied by self      Referring provider:  Dr Cardona  Dx: 2013    PATIENT CONCERNS RELATED TO DIABETES SELF MANAGEMENT: Follow up after Starting OP5  Last week      ASSESSMENT:    Taking Medication:     Current Diabetes Management per Patient:  Fiasp insulin in pump          Monitoring        Patient's most recent   Lab Results   Component Value Date    A1C 7.7 09/15/2022    A1C 6.8 05/10/2021      Patient's A1C goal: <7.0    Activity: Walks dogs, foster dogs    Healthy Eating:    Patient currently: 2 meals per day due to lack of appetite. History of gastric bypass 2001.     Breakfast - Coffee. 1/2 yogurt, no dose   Lunch - No, cheesesticks, apple with peanut butter  Dinner - Chicken, salad, rice, dessert-4 units  Snacks - Apples with peanut butter     Beverages: Coffee 1 cup in AM, diet coke       Problem Solving:      Patient is at risk of hypoglycemia?: YES  Hospitalizations for hyper or hypoglycemia: Unknown          EDUCATION and INSTRUCTION PROVIDED AT THIS VISIT:    Pt had 1x1 virtual visit for pump follow-up. Pt was started on OP5 pump last week. Pt reports she is doing great. She did have 1 pod fall off on thigh. She is wearing pump and sensor on arm which is her preferred site. Pt is going to get [employer] and order Pod covers.  Pt's time in range over the past week is 70%.  Pt was 50% in range on MDI at pump start. Pt is in automode 100% of the time. Pt reports no issues with pod changes. Pt is getting BG spikes  post meal and she reports he has been entering all of her carbs at meals/snacks. Today we adjusted her  I:C range to 1 unit per 30 grams and no other pump setting changed made. We reviewed back up insulin plan. Pt to take 5 units of Lantus or similar insulin for pump failure + 1 unit for every 30 grams of carb + correction 1  Unit for every 14 pts above 110.  Pt will follow-up w CDE on 12/15/22 @ 11am       Patient-stated goal written and given to Lynn Thompson.  Verbalized and demonstrated understanding of instructions.     PLAN:    Continue to enter in all carbs into bolus calculator try to bolus 15 mins before meals    Pump setting changed today I:C ratio is 1 unit for 30 grams of carb    Call if getting low blood sugars    FOLLOW-UP:    12/15/22 @ 11am virtual visit with Hannah Shook RN/JACKIEE    Hannah ZUNIGA, RN, [facility]  Certified Diabetes Care and   [facility] Endocrinology and Diabetes   [facility]  [address] [place] [address]  Phone [phone]      Time spent with patient at today's visit was 30 minutes.      Any diabetes medication dose changes were made via the CDE Protocol and Collaborative Practice Agreement with [place] [facility].  A copy of this encounter was provided to patient's referring provider.        Diabetes Self Management Training: Pre-Insulin Pump Assessment Visit 1    Lynn Thompson presents today for Pre pump education related to S/P TPIAT.    She is accompanied by self.     Patient's diabetes management related comments/concerns: Blood sugar control (hyperglycemia)    Patient's emotional response to diabetes: expresses readiness to learn.    Patient would like this visit to be focused around the following diabetes-related behaviors and goals: Blood glucose control.    ASSESSMENT:  Patient Problem List and Family Medical History reviewed for relevant medical history, current medical status, and diabetes risk factors.    Current Diabetes Management per  "Patient:  Taking diabetes medications?   yes:     Diabetes Medication(s)     Diabetic Other       Glucagon (GVOKE HYPOPEN 1-PACK) 1 MG/0.2ML SOAJ    Inject 1 mg Subcutaneous once as needed (for hypoglycemia with loss of consciousness)     glucose 40 % GEL    Take 15-30 g by mouth every 15 minutes as needed.    Insulin       FIASP PENFILL 100 UNIT/ML SOCT    Inject 0.5-4 Units Subcutaneous 4 times daily, INJECT with meals and nightly     Insulin Aspart, w/Niacinamide, (FIASP) 100 UNIT/ML SOLN    15 Units by Subcutaneous Infusion route daily Up to 85 units in continuous insulin pump                Past Diabetes Education: Yes    Patient glucose self monitoring as follows: four times daily.   BG meter: Dexcom Sensor   BG results:        BG values are: Not in goal  Patient's most recent   Lab Results   Component Value Date    A1C 7.7 09/15/2022    A1C 6.8 05/10/2021    is not meeting goal of <7.0    Nutrition:  Patient currently: 2 meals per day due to lack of appetite. History of gastric bypass 2001.    Breakfast - Coffee. 1/2 yogurt, no dose   Lunch - No, cheesesticks, apple with peanut butter  Dinner - Chicken, salad, rice, dessert-4 units  Snacks - Apples with peanut butter    Beverages: Coffee 1 cup in AM, diet coke    Biggest Challenge to Healthy Eating: Lack of appetite    Physical Activity:    Rescue volunteer, dog walking.    Diabetes Risk Factors:  S/P TPIAT    Diabetes Complications:  Acute Complications: Symptoms of hyperglycemia? blurred vision    Vitals:  [facility] 12/19/2013   Estimated body mass index is 18.88 kg/m  as calculated from the following:    Height as of an earlier encounter on 11/17/22: 1.626 m (5' 4\").    Weight as of 10/10/22: 49.9 kg (110 lb).   Last 3 BP:   BP Readings from Last 3 Encounters:   11/17/22 112/64   10/10/22 120/78   09/23/22 (!) 141/57       History   Smoking Status     Never   Smokeless Tobacco     Never       Labs:  Lab Results   Component Value Date    A1C 7.7 09/15/2022    A1C " 6.8 05/10/2021     Lab Results   Component Value Date     09/23/2022     09/23/2022     06/14/2022     05/10/2021     Lab Results   Component Value Date     09/15/2022    LDL 84 09/17/2020     HDL Cholesterol   Date Value Ref Range Status   09/17/2020 91 >49 mg/dL Final     Direct Measure HDL   Date Value Ref Range Status   09/15/2022 97 >=50 mg/dL Final   ]  GFR Estimate   Date Value Ref Range Status   10/31/2022 84 >60 mL/min/1.73m2 Final     Comment:     Effective December 21, 2021 eGFRcr in adults is calculated using the 2021 CKD-EPI creatinine equation which includes age and gender (Briana et al., [place], DOI: 10.1056/JNIAxi5316319)   05/10/2021 80 >60 mL/min/[1.73_m2] Final     Comment:     Non  GFR Calc  Starting 12/18/2018, serum creatinine based estimated GFR (eGFR) will be   calculated using the Chronic Kidney Disease Epidemiology Collaboration   (CKD-EPI) equation.       GFR, ESTIMATED POCT   Date Value Ref Range Status   11/23/2021 35 (L) >60 mL/min/1.73m2 Final     GFR Estimate If Black   Date Value Ref Range Status   05/10/2021 >90 >60 mL/min/[1.73_m2] Final     Comment:      GFR Calc  Starting 12/18/2018, serum creatinine based estimated GFR (eGFR) will be   calculated using the Chronic Kidney Disease Epidemiology Collaboration   (CKD-EPI) equation.       Lab Results   Component Value Date    CR 0.66 09/23/2022    CR 0.81 05/10/2021     No results found for: MICROALBUMIN    Socio/Economic History:    Support system: spouse/significant other and children    Health Beliefs and Attitudes:   Patient Activation Measure Survey Score:  JENNIFER Score (Last Two) 2/6/2020 10/6/2021   JENNIFER Raw Score 31 38   Activation Score 59.3 83.7   JENNIFER Level 3 4       Stage of Change: ACTION (Actively working towards change)      Diabetes knowledge and skills assessment:     Patient is knowledgeable in diabetes management concepts related to: Insulin Pump Concepts  Balancing glucose and insulin  Carbohydrate counting  Calculating boluses  Problem solving with insulin pump therapy (BG monitoring; hypoglycemia signs/symptoms, treatment (glucagon) and prevention; hyperglycemia signs/symptoms, treatment and prevention; ketones, DKA signs/symptoms and prevention)    Barriers to Learning Assessment: No Barriers identified    Based on learning assessment above, most appropriate setting for further diabetes education would be: Individual setting.    INTERVENTION:    Education provided today on:  In person visit completed for pre-pump assessment.   Basics of insulin pump therapy, including what is an insulin pump, how an insulin pump functions, advantages and disadvantages to insulin pump use, and requirements for successful insulin pump therapy, were discussed. Explained communication and treatment decisions based on Dexcom data. Informed pod changes need to occur every 3 days. Reviewed with patient pump process regarding ordering of Omnipod 5 insulin pump through pharmacy benefit ([facility]).     Opportunities for ongoing education and support in diabetes-self management were discussed.    Patient verbalized understanding of concepts discussed and recommendations provided today.       Education Materials Provided:  Omnipod 5 brochure    PLAN:    1. Omnipod pump 5 ordered to  [facility]. Pt to call CDE when pump ships so we can order vials Fiasp    2 No changes to insulin dosing today. TDD 12-15 units.  Lantus 5 units @ HS and Fiasp 0.5 units per 10 grams.  Correction .5 units for every 50 pts above 100      FOLLOW-UP:  Follow-up appointment scheduled on 11/9 at 8 AM for Omnipod 5 pump start. With Hannah Shook RN/STEPHEN ZUNIGA, RN, CDCES  Certified Diabetes Care and   [facility] Endocrinology and Diabetes  [facility]  [address] [place] [address]  Phone [phone]    Plan/AVS sent via My Chart.    Ongoing plan for education and support:  Follow-up visit with diabetes educator in 11/9 fo pump start.    Time Spent: 60 minutes  Encounter Type: Individual    Any diabetes medication dose changes were made via the CDE Protocol and Collaborative Practice Agreement with the patient's referring provider. A copy of this encounter was shared with the provider.    *Visit today completed by Rachel Izaguirre RN, CDE and Hannah Shook RN, CDE.

## 2022-11-29 ENCOUNTER — TRANSFERRED RECORDS (OUTPATIENT)
Dept: FAMILY MEDICINE | Facility: CLINIC | Age: 57
End: 2022-11-29

## 2022-12-09 ENCOUNTER — ANCILLARY PROCEDURE (OUTPATIENT)
Dept: ULTRASOUND IMAGING | Facility: CLINIC | Age: 57
End: 2022-12-09
Attending: STUDENT IN AN ORGANIZED HEALTH CARE EDUCATION/TRAINING PROGRAM
Payer: COMMERCIAL

## 2022-12-09 ENCOUNTER — ANCILLARY PROCEDURE (OUTPATIENT)
Dept: GENERAL RADIOLOGY | Facility: CLINIC | Age: 57
End: 2022-12-09
Attending: STUDENT IN AN ORGANIZED HEALTH CARE EDUCATION/TRAINING PROGRAM
Payer: COMMERCIAL

## 2022-12-09 DIAGNOSIS — N20.0 NEPHROLITHIASIS: ICD-10-CM

## 2022-12-09 PROCEDURE — 76770 US EXAM ABDO BACK WALL COMP: CPT

## 2022-12-09 PROCEDURE — 74018 RADEX ABDOMEN 1 VIEW: CPT

## 2022-12-09 SDOH — ECONOMIC STABILITY: INCOME INSECURITY: HOW HARD IS IT FOR YOU TO PAY FOR THE VERY BASICS LIKE FOOD, HOUSING, MEDICAL CARE, AND HEATING?: SOMEWHAT HARD

## 2022-12-09 SDOH — ECONOMIC STABILITY: INCOME INSECURITY: IN THE LAST 12 MONTHS, WAS THERE A TIME WHEN YOU WERE NOT ABLE TO PAY THE MORTGAGE OR RENT ON TIME?: NO

## 2022-12-09 SDOH — HEALTH STABILITY: PHYSICAL HEALTH: ON AVERAGE, HOW MANY MINUTES DO YOU ENGAGE IN EXERCISE AT THIS LEVEL?: 30 MIN

## 2022-12-09 SDOH — ECONOMIC STABILITY: FOOD INSECURITY: WITHIN THE PAST 12 MONTHS, THE FOOD YOU BOUGHT JUST DIDN'T LAST AND YOU DIDN'T HAVE MONEY TO GET MORE.: NEVER TRUE

## 2022-12-09 SDOH — ECONOMIC STABILITY: FOOD INSECURITY: WITHIN THE PAST 12 MONTHS, YOU WORRIED THAT YOUR FOOD WOULD RUN OUT BEFORE YOU GOT MONEY TO BUY MORE.: NEVER TRUE

## 2022-12-09 SDOH — HEALTH STABILITY: PHYSICAL HEALTH: ON AVERAGE, HOW MANY DAYS PER WEEK DO YOU ENGAGE IN MODERATE TO STRENUOUS EXERCISE (LIKE A BRISK WALK)?: 2 DAYS

## 2022-12-09 ASSESSMENT — ENCOUNTER SYMPTOMS
HEMATOCHEZIA: 0
HEADACHES: 1
SHORTNESS OF BREATH: 0
NERVOUS/ANXIOUS: 1
CONSTIPATION: 0
WEAKNESS: 0
FREQUENCY: 0
DIARRHEA: 0
MYALGIAS: 1
CHILLS: 0
SORE THROAT: 0
DYSURIA: 0
ARTHRALGIAS: 0
ABDOMINAL PAIN: 0
HEARTBURN: 0
EYE PAIN: 0
JOINT SWELLING: 0
NAUSEA: 1
FEVER: 0
PALPITATIONS: 0
PARESTHESIAS: 0
DIZZINESS: 0
COUGH: 0
HEMATURIA: 0
BREAST MASS: 0

## 2022-12-09 ASSESSMENT — ACTIVITIES OF DAILY LIVING (ADL)
CURRENT_FUNCTION: LAUNDRY REQUIRES ASSISTANCE
CURRENT_FUNCTION: HOUSEWORK REQUIRES ASSISTANCE

## 2022-12-09 ASSESSMENT — LIFESTYLE VARIABLES
HOW OFTEN DO YOU HAVE SIX OR MORE DRINKS ON ONE OCCASION: NEVER
HOW MANY STANDARD DRINKS CONTAINING ALCOHOL DO YOU HAVE ON A TYPICAL DAY: PATIENT DOES NOT DRINK
SKIP TO QUESTIONS 9-10: 1
AUDIT-C TOTAL SCORE: 0
HOW OFTEN DO YOU HAVE A DRINK CONTAINING ALCOHOL: NEVER

## 2022-12-09 ASSESSMENT — SOCIAL DETERMINANTS OF HEALTH (SDOH)
HOW OFTEN DO YOU ATTEND CHURCH OR RELIGIOUS SERVICES?: MORE THAN 4 TIMES PER YEAR
HOW OFTEN DO YOU GET TOGETHER WITH FRIENDS OR RELATIVES?: ONCE A WEEK
IN A TYPICAL WEEK, HOW MANY TIMES DO YOU TALK ON THE PHONE WITH FAMILY, FRIENDS, OR NEIGHBORS?: TWICE A WEEK
DO YOU BELONG TO ANY CLUBS OR ORGANIZATIONS SUCH AS CHURCH GROUPS UNIONS, FRATERNAL OR ATHLETIC GROUPS, OR SCHOOL GROUPS?: YES

## 2022-12-13 ENCOUNTER — OFFICE VISIT (OUTPATIENT)
Dept: UROLOGY | Facility: CLINIC | Age: 57
End: 2022-12-13
Payer: COMMERCIAL

## 2022-12-13 ENCOUNTER — OFFICE VISIT (OUTPATIENT)
Dept: FAMILY MEDICINE | Facility: CLINIC | Age: 57
End: 2022-12-13
Payer: COMMERCIAL

## 2022-12-13 VITALS
TEMPERATURE: 98.4 F | BODY MASS INDEX: 20.88 KG/M2 | HEIGHT: 64 IN | HEART RATE: 66 BPM | RESPIRATION RATE: 16 BRPM | DIASTOLIC BLOOD PRESSURE: 86 MMHG | OXYGEN SATURATION: 98 % | WEIGHT: 122.3 LBS | SYSTOLIC BLOOD PRESSURE: 122 MMHG

## 2022-12-13 VITALS
BODY MASS INDEX: 19.63 KG/M2 | DIASTOLIC BLOOD PRESSURE: 79 MMHG | WEIGHT: 115 LBS | SYSTOLIC BLOOD PRESSURE: 134 MMHG | HEIGHT: 64 IN

## 2022-12-13 DIAGNOSIS — E13.9 POST-PANCREATECTOMY DIABETES (H): ICD-10-CM

## 2022-12-13 DIAGNOSIS — K86.81 EXOCRINE PANCREATIC INSUFFICIENCY: ICD-10-CM

## 2022-12-13 DIAGNOSIS — Z12.31 ENCOUNTER FOR SCREENING MAMMOGRAM FOR BREAST CANCER: ICD-10-CM

## 2022-12-13 DIAGNOSIS — K21.9 GASTROESOPHAGEAL REFLUX DISEASE WITHOUT ESOPHAGITIS: ICD-10-CM

## 2022-12-13 DIAGNOSIS — Z00.00 ENCOUNTER FOR MEDICARE ANNUAL WELLNESS EXAM: Primary | ICD-10-CM

## 2022-12-13 DIAGNOSIS — Z90.410 POST-PANCREATECTOMY DIABETES (H): ICD-10-CM

## 2022-12-13 DIAGNOSIS — N20.0 NEPHROLITHIASIS: Primary | ICD-10-CM

## 2022-12-13 DIAGNOSIS — R82.992 HYPEROXALURIA: ICD-10-CM

## 2022-12-13 DIAGNOSIS — R82.991 HYPOCITRATURIA: ICD-10-CM

## 2022-12-13 DIAGNOSIS — E03.9 ACQUIRED HYPOTHYROIDISM: ICD-10-CM

## 2022-12-13 DIAGNOSIS — E89.1 POST-PANCREATECTOMY DIABETES (H): ICD-10-CM

## 2022-12-13 PROBLEM — N17.9 ACUTE KIDNEY INJURY (H): Status: RESOLVED | Noted: 2021-11-23 | Resolved: 2022-12-13

## 2022-12-13 LAB
ALBUMIN UR-MCNC: NEGATIVE MG/DL
APPEARANCE UR: CLEAR
BILIRUB UR QL STRIP: NEGATIVE
COLOR UR AUTO: YELLOW
GLUCOSE UR STRIP-MCNC: NEGATIVE MG/DL
HGB UR QL STRIP: NEGATIVE
KETONES UR STRIP-MCNC: NEGATIVE MG/DL
LEUKOCYTE ESTERASE UR QL STRIP: ABNORMAL
NITRATE UR QL: NEGATIVE
PH UR STRIP: 6 [PH] (ref 5–7)
SP GR UR STRIP: >=1.03 (ref 1–1.03)
UROBILINOGEN UR STRIP-ACNC: 0.2 E.U./DL

## 2022-12-13 PROCEDURE — 99214 OFFICE O/P EST MOD 30 MIN: CPT | Performed by: STUDENT IN AN ORGANIZED HEALTH CARE EDUCATION/TRAINING PROGRAM

## 2022-12-13 PROCEDURE — G0438 PPPS, INITIAL VISIT: HCPCS | Performed by: FAMILY MEDICINE

## 2022-12-13 PROCEDURE — 81003 URINALYSIS AUTO W/O SCOPE: CPT | Mod: QW | Performed by: STUDENT IN AN ORGANIZED HEALTH CARE EDUCATION/TRAINING PROGRAM

## 2022-12-13 RX ORDER — LEVOTHYROXINE SODIUM 100 UG/1
100 TABLET ORAL DAILY
Qty: 90 TABLET | Refills: 3 | Status: SHIPPED | OUTPATIENT
Start: 2022-12-13 | End: 2023-03-27

## 2022-12-13 RX ORDER — OMEPRAZOLE 40 MG/1
40 CAPSULE, DELAYED RELEASE ORAL 2 TIMES DAILY
Qty: 180 CAPSULE | Refills: 3 | Status: SHIPPED | OUTPATIENT
Start: 2022-12-13 | End: 2024-01-13

## 2022-12-13 RX ORDER — FAMOTIDINE 40 MG/1
40 TABLET, FILM COATED ORAL 2 TIMES DAILY PRN
Qty: 180 TABLET | Refills: 3 | Status: SHIPPED | OUTPATIENT
Start: 2022-12-13 | End: 2024-03-13

## 2022-12-13 ASSESSMENT — ENCOUNTER SYMPTOMS
MYALGIAS: 1
HEMATOCHEZIA: 0
DIARRHEA: 0
SHORTNESS OF BREATH: 0
COUGH: 0
CHILLS: 0
WEAKNESS: 0
HEADACHES: 1
EYE PAIN: 0
FREQUENCY: 0
ABDOMINAL PAIN: 0
FEVER: 0
HEARTBURN: 0
PARESTHESIAS: 0
NAUSEA: 1
JOINT SWELLING: 0
PALPITATIONS: 0
SORE THROAT: 0
HEMATURIA: 0
DYSURIA: 0
CONSTIPATION: 0
DIZZINESS: 0
NERVOUS/ANXIOUS: 1
BREAST MASS: 0
ARTHRALGIAS: 0

## 2022-12-13 ASSESSMENT — PATIENT HEALTH QUESTIONNAIRE - PHQ9
SUM OF ALL RESPONSES TO PHQ QUESTIONS 1-9: 9
10. IF YOU CHECKED OFF ANY PROBLEMS, HOW DIFFICULT HAVE THESE PROBLEMS MADE IT FOR YOU TO DO YOUR WORK, TAKE CARE OF THINGS AT HOME, OR GET ALONG WITH OTHER PEOPLE: SOMEWHAT DIFFICULT
SUM OF ALL RESPONSES TO PHQ QUESTIONS 1-9: 9

## 2022-12-13 ASSESSMENT — PAIN SCALES - GENERAL
PAINLEVEL: NO PAIN (0)
PAINLEVEL: NO PAIN (0)

## 2022-12-13 ASSESSMENT — ACTIVITIES OF DAILY LIVING (ADL)
CURRENT_FUNCTION: HOUSEWORK REQUIRES ASSISTANCE
CURRENT_FUNCTION: LAUNDRY REQUIRES ASSISTANCE

## 2022-12-13 NOTE — PROGRESS NOTES
"  SUBJECTIVE:   Lynn is a 57 year old who presents for Preventive Visit.    Patient has been advised of split billing requirements and indicates understanding: Yes  Are you in the first 12 months of your Medicare coverage?  No    Healthy Habits:     In general, how would you rate your overall health?  Good    Frequency of exercise:  2-3 days/week    Duration of exercise:  15-30 minutes    Do you usually eat at least 4 servings of fruit and vegetables a day, include whole grains    & fiber and avoid regularly eating high fat or \"junk\" foods?  Yes    Taking medications regularly:  Yes    Medication side effects:  None    Ability to successfully perform activities of daily living:  Housework requires assistance and laundry requires assistance    Home Safety:  No safety concerns identified    Hearing Impairment:  No hearing concerns    In the past 6 months, have you been bothered by leaking of urine? Yes    In general, how would you rate your overall mental or emotional health?  Good      PHQ-2 Total Score: 2    Additional concerns today:  Yes    Have you ever done Advance Care Planning? (For example, a Health Directive, POLST, or a discussion with a medical provider or your loved ones about your wishes): No, advance care planning information given to patient to review.  Patient plans to discuss their wishes with loved ones or provider.       Fall risk  Fallen 2 or more times in the past year?: No  Any fall with injury in the past year?: No  Cognitive Screening   1) Repeat 3 items (Leader, Season, Table)    2) Clock draw: NORMAL  3) 3 item recall: Recalls 3 objects  Results: 3 items recalled: COGNITIVE IMPAIRMENT LESS LIKELY    Mini-CogTM Copyright PHILIP Casanova. Licensed by the author for use in [facility]; reprinted with permission ([email or web address]). All rights reserved.      Do you have sleep apnea, excessive snoring or daytime drowsiness?: yes    Reviewed and updated as needed this visit by clinical staff   " Tobacco  Allergies  Meds              Reviewed and updated as needed this visit by Provider                 Social History     Tobacco Use     Smoking status: Never     Smokeless tobacco: Never   Substance Use Topics     Alcohol use: Not Currently         Alcohol Use 12/9/2022   Prescreen: >3 drinks/day or >7 drinks/week? No   Prescreen: >3 drinks/day or >7 drinks/week? -       Current providers sharing in care for this patient include:   Patient Care Team:  Maryana Brooks MD as PCP - General (Family Practice)  Ana Davis MD as MD (Pulmonary Disease)  Bartolome Disla MD as MD (Neurology)  Rina Watt  Renard, Domonique HAILE NP as Nurse Practitioner (Nurse Practitioner Psych/Mental Health)  Adriana Robles MD as Assigned Pediatric Specialist Provider  Maryana Brooks MD as Assigned PCP  Sabrina Rutledge APRN CNP as Nurse Practitioner (Colon & Rectal)  Liliya Urrutia MD as Assigned Cancer Care Provider  Nicole Rivera MD as MD (Urology)  Nicole Rivera MD as MD (Urology)  Charmaine Navarro, RN as Specialty Care Coordinator (Urology)  Charmaine Navarro, RN as Specialty Care Coordinator (Urology)  Maryana Brooks MD as Referring Physician (Family Medicine)  Nicole Rivera MD as Assigned Surgical Provider  Hannah Shook, RN as Diabetes Educator  Rogerio Heller MD as Assigned Neuroscience Provider  Nikunj Plummer MD as Assigned Musculoskeletal Provider    The following health maintenance items are reviewed in Epic and correct as of today:  Health Maintenance   Topic Date Due     HEPATITIS B IMMUNIZATION (1 of 3 - 3-dose series) Never done     ZOSTER IMMUNIZATION (1 of 2) Never done     MENINGITIS IMMUNIZATION (2 - Risk 2-dose series) 06/18/2013     Pneumococcal Vaccine: Pediatrics (0 to 5 Years) and At-Risk Patients (6 to 64 Years) (3 - PCV) 07/09/2020     MEDICARE ANNUAL WELLNESS VISIT  08/17/2022     DIABETIC FOOT EXAM  08/17/2022      MAMMO SCREENING  2023     A1C  03/15/2023     ANNUAL REVIEW OF HM ORDERS  2023     EYE EXAM  2023     PHQ-9  2023     LIPID  09/15/2023     BMP  10/31/2023     MICROALBUMIN  2023     HPV TEST  2024     PAP  2024     ADVANCE CARE PLANNING  2027     DTAP/TDAP/TD IMMUNIZATION (4 - Td or Tdap) 2030     COLORECTAL CANCER SCREENING  2031     HEPATITIS C SCREENING  Completed     HIV SCREENING  Completed     DEPRESSION ACTION PLAN  Completed     INFLUENZA VACCINE  Completed     COVID-19 Vaccine  Completed     IPV IMMUNIZATION  Aged Out     URINE DRUG SCREEN  Discontinued     Patient Active Problem List   Diagnosis     Iron deficiency anemia secondary to inadequate dietary iron intake     Gastric bypass status for obesity     Health Care Home     Chronic pain syndrome     Exocrine pancreatic insufficiency     Asplenia     BLU (obstructive sleep apnea)     H/O of rectopexy     Dysthymia     Anxiety     Thyroid nodule     Acquired hypothyroidism     Post-pancreatectomy diabetes (H)     Other iron deficiency anemia     Uterovaginal prolapse, unspecified     Rectal prolapse     Small bowel obstruction (H)     Acute kidney injury (H)     Urge incontinence     Urinary urgency     High-tone pelvic floor dysfunction     Pelvic floor dysfunction     Pyuria     Kidney stone on left side     Past Surgical History:   Procedure Laterality Date     ABDOMINOPLASTY  2002    Tummy tuck     APPENDECTOMY  1990     BUNIONECTOMY Right 1998     CBD Stent placement  2002    CBD stent; Dr. Presley      SECTION       CHOLECYSTECTOMY       COLONOSCOPY N/A 2021    Procedure: COLONOSCOPY INCOMPLETE Aborted due to incomplete prep  will need to take additional prep and return tomorrow 21;  Surgeon: Ihsan Saenz MD;  Location:  GI     COMBINED CYSTOSCOPY, RETROGRADES, URETEROSCOPY, LASER HOLMIUM LITHOTRIPSY URETER(S), INSERT STENT Right 2015     Procedure: COMBINED CYSTOSCOPY, RETROGRADES, URETEROSCOPY, LASER HOLMIUM LITHOTRIPSY URETER(S), INSERT STENT;  Surgeon: Kennedi Aldana MD;  [facility]     COMBINED CYSTOSCOPY, RETROGRADES, URETEROSCOPY, LASER HOLMIUM LITHOTRIPSY URETER(S), INSERT STENT Right 04/20/2015    Procedure: COMBINED CYSTOSCOPY, RETROGRADES, URETEROSCOPY, LASER HOLMIUM LITHOTRIPSY URETER(S), INSERT STENT;  Surgeon: Kennedi Aldana MD;  Location: [facility] OR     COSMETIC SURGERY  6/24/2002    Tummy tuck     CYSTECTOMY OVARIAN BENIGN Right      CYSTOSCOPY, RETROGRADES, INSERT STENT URETER(S), COMBINED  10/02/2012    Procedure: COMBINED CYSTOSCOPY, RETROGRADES, INSERT STENT URETER(S);  COMBINED CYSTOSCOPY,  , INSERT LEFT STENT URETER;  Surgeon: Johny Baez MD;  Location: [facility] OR     CYSTOSCOPY, RETROGRADES, INSERT STENT URETER(S), COMBINED Left 9/20/2022    Procedure: Cystoscopy with left retrograde pyelogram, left ureteroscopy, thulium laser lithotripsy of left ureteral calculus, stone basketing and left ureteral stent insertion;  Surgeon: Alonzo Rome MD;  Location: [facility] OR     ESOPHAGOSCOPY, GASTROSCOPY, DUODENOSCOPY (EGD), COMBINED N/A 01/24/2018    Procedure: COMBINED ESOPHAGOSCOPY, GASTROSCOPY, DUODENOSCOPY (EGD);  ESOPHAGOSCOPY, GASTROSCOPY, DUODENOSCOPY (EGD)    ;  Surgeon: Tamir Rodgers MD;  Location: [facility] GI     EXTRACORPOREAL SHOCK WAVE LITHOTRIPSY (ESWL)  10/16/2012    Procedure: EXTRACORPOREAL SHOCK WAVE LITHOTRIPSY (ESWL);  left EXTRACORPOREAL SHOCK WAVE LITHOTRIPSY (ESWL) ;  Surgeon: Johny Baez MD;  Location: [facility] OR     Gastric bypass NOS       HERNIA REPAIR  02/2015     HYSTERECTOMY SUPRACERVICAL, BILATERAL SALPINGO-OOPHORECTOMY, COMBINED N/A 02/01/2022    Procedure: Abdominal supracervical hysterectomy, bilateral salpingooophrectomy;  Surgeon: Nicole Rivera MD;  [facility]     IRRIGATION AND DEBRIDEMENT HAND, COMBINED Left 10/30/2020    Procedure: Left hand sharp excisional  debridement of skin, subcutaneous tissue and fat with a scalpel, 2 x 1 x 1 cm.;  Surgeon: Demian Renteria MD;  Location: [facility]     LAPAROSCOPIC LYSIS ADHESIONS N/A 2015    Procedure: LAPAROSCOPIC LYSIS ADHESIONS;  Surgeon: Aaron Early MD;  [facility]     LAPAROSCOPIC LYSIS ADHESIONS N/A 2015    Procedure: LAPAROSCOPIC LYSIS ADHESIONS;  Surgeon: Aaron Early MD;  [facility]     PANCREATECTOMY, TRANSPLANT AUTO ISLET CELL, COMBINED  05/10/2013    Procedure: COMBINED PANCREATECTOMY, TRANSPLANT AUTO ISLET CELL;  Pancreatectomy, Auto Islet Cell Transplant   hernia repair, jejunostomy tube and liver biopsies with Anesthesia General with block;  Surgeon: Aaron Early MD;  [facility]     Partial ileum resection       RECTOPEXY ABDOMINAL N/A 2022    Procedure: RECTOPEXY, ABDOMINAL;  Surgeon: Uriah Sheridan MD;  [facility]     REPAIR PTOSIS BROW BILATERAL Bilateral 2020    Procedure: BILATERAL BROW PTOSIS REPAIR;  Surgeon: Denise Alberts MD;  Location: [facility]     SACROCOLPOPEXY, CYSTOSCOPY, COMBINED N/A 2022    Procedure: Uterosacral ligament suspension, pina colposuspension with Cystoscopy;  Surgeon: Nicole Rivera MD;  [facility]     SIGMOIDOSCOPY FLEXIBLE N/A 2022    Procedure: SIGMOIDOSCOPY, FLEXIBLE;  Surgeon: Uriah Sheridan MD;  [facility]     Surgery for SBO       TONSILLECTOMY, ADENOIDECTOMY, COMBINED  1997     TRANSPLANT  5/10/13    Pancreatic Auto-Islet Transplant       Social History     Tobacco Use     Smoking status: Never     Smokeless tobacco: Never   Substance Use Topics     Alcohol use: Not Currently     Family History   Problem Relation Age of Onset     Family History Negative Mother      Respiratory Father         COPD;  at 69     Genitourinary Problems Father         kidney stones     Substance Abuse Father      Depression Father      Asthma Father      Heart Disease Paternal  Grandfather         M.I.     Coronary Artery Disease Paternal Grandfather      Hyperlipidemia Paternal Grandfather      Genitourinary Problems Brother         multiple brothers with kidney stones     Gastrointestinal Disease Maternal Grandmother         undiagnosed 'gut' issues     Coronary Artery Disease Maternal Grandfather      Hyperlipidemia Maternal Grandfather      Cerebrovascular Disease Paternal Grandmother         At the age of 103     Anxiety Disorder Paternal Grandmother      Osteoporosis Paternal Grandmother      Anxiety Disorder Son      Anxiety Disorder Daughter      Asthma Daughter      Colon Cancer No family hx of              Breast CA Risk Assessment (FHS-7) 12/9/2022   Do you have a family history of breast, colon, or ovarian cancer? No / Unknown         Mammogram Screening: Recommended mammography every 1-2 years with patient discussion and risk factor consideration  Pertinent mammograms are reviewed under the imaging tab.    Review of Systems   Constitutional: Negative for chills and fever.   HENT: Negative for congestion, ear pain, hearing loss and sore throat.    Eyes: Negative for pain and visual disturbance.   Respiratory: Negative for cough and shortness of breath.    Cardiovascular: Negative for chest pain, palpitations and peripheral edema.   Gastrointestinal: Positive for nausea. Negative for abdominal pain, constipation, diarrhea, heartburn and hematochezia.   Breasts:  Negative for tenderness, breast mass and discharge.   Genitourinary: Negative for dysuria, frequency, genital sores, hematuria, pelvic pain, urgency, vaginal bleeding and vaginal discharge.   Musculoskeletal: Positive for myalgias. Negative for arthralgias and joint swelling.   Skin: Negative for rash.   Neurological: Positive for headaches. Negative for dizziness, weakness and paresthesias.   Psychiatric/Behavioral: Negative for mood changes. The patient is nervous/anxious.          OBJECTIVE:   /86   Pulse 66    "Temp 98.4  F (36.9  C) (Oral)   Resp 16   Ht 1.626 m (5' 4\")   Wt 55.5 kg (122 lb 4.8 oz)   LMP 12/19/2013   SpO2 98%   BMI 20.99 kg/m   Estimated body mass index is 20.99 kg/m  as calculated from the following:    Height as of this encounter: 1.626 m (5' 4\").    Weight as of this encounter: 55.5 kg (122 lb 4.8 oz).  Physical Exam  GENERAL APPEARANCE: healthy, alert and no distress  EYES: Eyes grossly normal to inspection, PERRL and conjunctivae and sclerae normal  HENT: ear canals and TM's normal, nose and mouth without ulcers or lesions, oropharynx clear and oral mucous membranes moist  NECK: no adenopathy, no asymmetry, masses, or scars and thyroid normal to palpation  RESP: lungs clear to auscultation - no rales, rhonchi or wheezes  BREAST: normal without masses, tenderness or nipple discharge and no palpable axillary masses or adenopathy  CV: regular rate and rhythm, normal S1 S2, no S3 or S4, no murmur, click or rub, no peripheral edema and peripheral pulses strong  ABDOMEN: soft, nontender, no hepatosplenomegaly, no masses and bowel sounds normal  MS: no musculoskeletal defects are noted and gait is age appropriate without ataxia  SKIN: no suspicious lesions or rashes  NEURO: Normal strength and tone, sensory exam grossly normal, mentation intact and speech normal  PSYCH: mentation appears normal and affect normal/bright    Diagnostic Test Results:  Labs reviewed in Epic    ASSESSMENT / PLAN:     1. Encounter for Medicare annual wellness exam    2. Acquired hypothyroidism - surveillance labs next bloodwork through Endo  - TSH; Future  - T4, free; Future  - levothyroxine (SYNTHROID/LEVOTHROID) 100 MCG tablet; Take 1 tablet (100 mcg) by mouth daily  Dispense: 90 tablet; Refill: 3    3. Gastroesophageal reflux disease without esophagitis - stable, refills  - omeprazole (PRILOSEC) 40 MG DR capsule; Take 1 capsule (40 mg) by mouth 2 times daily Take 30-60 minutes before a meal.  Dispense: 180 capsule; " Refill: 3  - famotidine (PEPCID) 40 MG tablet; Take 1 tablet (40 mg) by mouth 2 times daily as needed for heartburn  Dispense: 180 tablet; Refill: 3    4. Post-pancreatectomy diabetes (H) - following with diab ed and endo   - Lipid panel reflex to direct LDL Fasting; Future    5. Acute kidney injury (H) - surveillance labs next visit  - Comprehensive metabolic panel (BMP + Alb, Alk Phos, ALT, AST, Total. Bili, TP); Future    6. Encounter for screening mammogram for breast cancer  - MA Screen Bilateral w/Elieser; Future      COUNSELING:  Reviewed preventive health counseling, as reflected in patient instructions        She reports that she has never smoked. She has never used smokeless tobacco.      Appropriate preventive services were discussed with this patient, including applicable screening as appropriate for cardiovascular disease, diabetes, osteopenia/osteoporosis, and glaucoma.  As appropriate for age/gender, discussed screening for colorectal cancer, prostate cancer, breast cancer, and cervical cancer. Checklist reviewing preventive services available has been given to the patient.    Reviewed patients plan of care and provided an AVS. The Basic Care Plan (routine screening as documented in Health Maintenance) for Lynn meets the Care Plan requirement. This Care Plan has been established and reviewed with the Patient.      Maryana Brooks MD  [facility]    Identified Health Risks:

## 2022-12-13 NOTE — PROGRESS NOTES
"CHIEF COMPLAINT   Lynn Thompson who is a 57 year old female returns today for follow-up of complex past medical history including h/o gastric bypass chronic pancreatitis with pancreatectomy and auto islet transplant, numerous episodes of nephrolithiasis requiring treatment in the past now s/p left URS 9/20/2022.      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      Here for followup    PHYSICAL EXAM  Patient is a 57 year old  female   Vitals: Blood pressure 134/79, height 1.626 m (5' 4\"), weight 52.2 kg (115 lb), last menstrual period 12/19/2013, not currently breastfeeding.  Body mass index is 19.74 kg/m .  General Appearance Adult:   Alert, no acute distress, oriented  HENT: throat/mouth:normal, good dentition  Lungs: no respiratory distress, or pursed lip breathing  Heart: No obvious jugular venous distension present  Abdomen: nondistended  Musculoskeltal: extremities normal, no peripheral edema  Skin: no suspicious lesions or rashes  Neuro: Alert, oriented, speech and mentation normal  Psych: affect and mood normal  Gait: Normal    All pertinent imaging reviewed:    kub 12/9/2022    Reviewed personally. No obvious renal stones, large stool burden    12/9/2022 renal ultrasound  IMPRESSION:  1.  Normal kidney ultrasound.     2.  Nonvisualization of the bladder due to under distention.        ASSESSMENT and PLAN  57 year old female returns today for follow-up of complex past medical history including h/o gastric bypass chronic pancreatitis with pancreatectomy and auto islet transplant, numerous episodes of nephrolithiasis requiring treatment in the past now s/p left URS 9/20/2022.      No obvious large stone burden on KUB or renal ultrasound    litholink " reviewed. Extreme hypocitraturia, as well as hyperoxaluria, leading to marked elevation of calcium oxalate supersaturation and thus very high risk of recurrent stone formation. Needs nephrology consultation. Will start Cytra-K packets (patient has h/o RYGB and also Crohn's with small bowel resection apparently, so K citrate slow release tablets probably won't be effective. She has strong FH of stones with many of the siblings getting stones, for example her brother has passed hundreds    - start Cytra-K packets qid  - reduce oxalate in diet as able  - referral to nephrology (recommend Dr. Murcia @ [facility] given complexity of her case)  - return 1 year with CT abd/pelvis w/o contrast    Alonzo Rome MD   [facility]rology  [facility] Phone: [phone]

## 2022-12-13 NOTE — PATIENT INSTRUCTIONS
"  Patient Education   Personalized Prevention Plan  You are due for the preventive services outlined below.  Your care team is available to assist you in scheduling these services.  If you have already completed any of these items, please share that information with your care team to update in your medical record.  Health Maintenance Due   Topic Date Due     Hepatitis B Vaccine (1 of 3 - 3-dose series) Never done     Zoster (Shingles) Vaccine (1 of 2) Never done     Meningitis A Vaccine (2 - Risk 2-dose series) 06/18/2013     Pneumococcal Vaccine (3 - PCV) 07/09/2020     Annual Wellness Visit  08/17/2022     Diabetic Foot Exam  08/17/2022       Depression and Suicide in Older Adults    Nearly 2 million older Americans have some type of depression. Some of them even take their own lives. Yet depression among older adults is often ignored. Learn the warning signs. You may help spare a loved one needless pain. You may also save a life.   What is depression?  Depression is a common and serious illness that affects the way you think and feel. It is not a normal part of aging, nor is it a sign of weakness, a character flaw, or something you can snap out of. Most people with depression need treatment to get better. The most common symptom is a feeling of deep sadness. People who are depressed also may seem tired and listless. And nothing seems to give them pleasure. It s normal to grieve or be sad sometimes. But sadness lessens or passes with time. Depression rarely goes away or improves on its own. A person with clinical depression can't \"snap out of it.\" Other symptoms of depression are:     Sleeping more or less than normal    Eating more or less than normal    Having headaches, stomachaches, or other pains that don t go away    Feeling nervous,  empty,  or worthless    Crying a great deal    Thinking or talking about suicide or death    Loss of interest in activities previously enjoyed    Social isolation    Feeling " confused or forgetful  What causes it?  The causes of depression aren t fully known. But it is thought to result from a complex blend of these factors:     Biochemistry. Certain chemicals in the brain play a role.    Genes. Depression does run in families.    Life stress. Life stresses can also trigger depression in some people. Older adults often face many stressors, such as death of friends or a spouse, health problems, and financial concerns.    Chronic conditions. This includes conditions such as diabetes, heart disease, or cancer. These can cause symptoms of depression. Medicine side effects can cause changes in thoughts and behaviors.  How you can help  Often, depressed people may not want to ask for help. When they do, they may be ignored. Or, they may receive the wrong treatment. You can help by showing parents and older friends love and support. If they seem depressed, don t lecture the person, ignore the symptoms, or discount the symptoms as a  normal  part of aging -which they are not. Get involved, listen, and show interest and support.   Help them understand that depression is a treatable illness. Tell them you can help them find the right treatment. Offer to go to their healthcare provider's appointment with them for support when the symptoms are discussed. With their approval, contact a local mental health center, social service agency, or hospital about services.   You can be an advocate for him or her at healthcare appointments. Many older adults have chronic illnesses that can cause symptoms of depression. Medicine side effects can change thoughts and behaviors. You can help make sure that the healthcare provider looks at all of these factors. He or she should refer your family member or friend to a mental healthcare provider when needed. in some cases, untreated depression can lead to a misdiagnosis. A person may be diagnosed with a brain disorder such as dementia. If the healthcare provider does  not take the issue of depression seriously, help your family member or friend to find another provider.   Don't be afraid to ask  If you think an older person you care about could be suicidal, ask,  Have you thought about suicide?  Most people will tell you the truth. If they say  yes,  they may already have a plan for how and when they will attempt it. Find out as much as you can. The more detailed the plan, and the easier it is to carry out, the more danger the person is in right now. Tell the person you are there for them and do not want them to harm him or herself. Don't wait to get help for the person. Call the person's healthcare provider, local hospital, or emergency services.   To learn more    National Suicide Prevention Lifeline (crisis hotline) 007-281-RQNK (639-166-3627)    National [place] of Mental Mmcyir583-140-3826mjr.[facility].nih.gov    National [place] on Mental Zkpqlsr872-208-3921qaz.mar[place].org    Mental Health Dcvvccv253-556-8564amt.[facility].org    National Suicide Yadfryy329-JPUWHVP (577-850-2766)    Call 911  Never leave the person alone. A person who is actively suicidal needs psychiatric care right away. They will need constant supervision. Never leave the person out of sight. Call 911 or the national 24-hour suicide crisis hotline at 618-869-WDUO (732-325-8105). You can also take the person to the closest emergency room.   [employer] last reviewed this educational content on 5/1/2020 2000-2021 [employer]. All rights reserved. This information is not intended as a substitute for professional medical care. Always follow your healthcare professional's instructions.

## 2022-12-13 NOTE — NURSING NOTE
Chief Complaint   Patient presents with     Kidney Stone Related     Review [facility] and Imaging results     Fannie Kahn, EMT

## 2022-12-13 NOTE — LETTER
"12/13/2022       RE: Lynn Thompson  [address] [facility] [address]     Dear Colleague,    Thank you for referring your patient, Lynn Thompson, to the [facility] UROLOGY CLINIC [place] at [facility]. Please see a copy of my visit note below.    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      Here for followup    PHYSICAL EXAM  Patient is a 57 year old  female   Vitals: Blood pressure 134/79, height 1.626 m (5' 4\"), weight 52.2 kg (115 lb), last menstrual period 12/19/2013, not currently breastfeeding.  Body mass index is 19.74 kg/m .  General Appearance Adult:   Alert, no acute distress, oriented  HENT: throat/mouth:normal, good dentition  Lungs: no respiratory distress, or pursed lip breathing  Heart: No obvious jugular venous distension present  Abdomen: nondistended  Musculoskeltal: extremities normal, no peripheral edema  Skin: no suspicious lesions or rashes  Neuro: Alert, oriented, speech and mentation normal  Psych: affect and mood normal  Gait: Normal    All pertinent imaging reviewed:    kub 12/9/2022    Reviewed personally. No obvious renal stones, large stool burden    12/9/2022 renal ultrasound  IMPRESSION:  1.  Normal kidney ultrasound.     2.  Nonvisualization of the bladder due to under distention.        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      No obvious large stone burden on KUB or renal ultrasound    litholink reviewed. Extreme hypocitraturia, as well as hyperoxaluria, leading to marked elevation of calcium oxalate supersaturation and thus very high risk of recurrent stone formation. Needs nephrology consultation. Will start Cytra-K packets (patient has h/o RYGB and also Crohn's with small bowel resection apparently, so K citrate slow release tablets probably won't be effective. She has strong FH of stones with many of the siblings getting stones, for example her brother has passed hundreds    - start Cytra-K packets qid  - reduce oxalate in diet as able  - referral to nephrology (recommend Dr. Murcia @ [facility] given complexity of her case)  - return 1 year with CT abd/pelvis w/o contrast    Alonzo Rome MD   [facility]  [facility] Phone: [phone]

## 2022-12-15 ENCOUNTER — VIRTUAL VISIT (OUTPATIENT)
Dept: EDUCATION SERVICES | Facility: CLINIC | Age: 57
End: 2022-12-15
Payer: MEDICARE

## 2022-12-15 ENCOUNTER — TELEPHONE (OUTPATIENT)
Dept: NEPHROLOGY | Facility: CLINIC | Age: 57
End: 2022-12-15

## 2022-12-15 DIAGNOSIS — N20.0 KIDNEY STONE ON LEFT SIDE: Primary | ICD-10-CM

## 2022-12-15 DIAGNOSIS — E87.6 HYPOKALEMIA: ICD-10-CM

## 2022-12-15 DIAGNOSIS — E13.9 POST-PANCREATECTOMY DIABETES (H): Primary | ICD-10-CM

## 2022-12-15 DIAGNOSIS — D64.9 ANEMIA: ICD-10-CM

## 2022-12-15 DIAGNOSIS — Z90.410 POST-PANCREATECTOMY DIABETES (H): Primary | ICD-10-CM

## 2022-12-15 DIAGNOSIS — E89.1 POST-PANCREATECTOMY DIABETES (H): Primary | ICD-10-CM

## 2022-12-15 PROCEDURE — 99207 PR DIABETES SELF MANAGEMENT: CPT | Mod: 95

## 2022-12-15 RX ORDER — PROCHLORPERAZINE 25 MG/1
1 SUPPOSITORY RECTAL
Qty: 1 EACH | Refills: 1 | Status: SHIPPED | OUTPATIENT
Start: 2022-12-15 | End: 2023-06-20

## 2022-12-15 RX ORDER — PROCHLORPERAZINE 25 MG/1
1 SUPPOSITORY RECTAL
Qty: 3 EACH | Refills: 5 | Status: SHIPPED | OUTPATIENT
Start: 2022-12-15 | End: 2023-06-20

## 2022-12-15 NOTE — PROGRESS NOTES
Video-Visit Details    Type of service:  Video Visit  Patient consented to video visit  Video Start Time:  11  Video End Time:   1130  Originating Location (pt. 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 History of gastric bypass 2001.     Breakfast - Coffee. 1/2 yogurt, no dose   Lunch - No, cheesesticks, apple with peanut butter  Dinner - Chicken, salad, rice, dessert-4 units  Snacks - Apples with peanut butter     Beverages: Coffee 1 cup in AM, diet coke       Problem Solving:      Patient is at risk of hypoglycemia?: YES  Hospitalizations for hyper or hypoglycemia: Unknown          EDUCATION and INSTRUCTION PROVIDED AT THIS VISIT:    Pt had 1x1 virtual visit for pump follow-up. Pt had some issues get Pods, she needed PA.. Issue is resolved for now. She states she will need another PA in Jan for the pods. Pt also is requesting her sensors [facility], her current supplier is no longer in her network.  Due to issues with sensor pt was only in automode 65% of the time. Pt continues to get high blood sugars post meal. Today we increased her I:C ratio to 1 unit per 28 grams of carb.  Pt  will follow up with CDE in 1 month. RX sent to [facility] for Dexcom sensors.    Back up insulin plan. Pt to take 5 units of Lantus or similar insulin for pump failure + 1 unit for every 30 grams of carb + correction 1  Unit for every 14 pts above 110.  Pt will follow-up w CDE on 1/17/23 @ 11am       Patient-stated goal written and given to Lynn Thompson.  Verbalized and demonstrated understanding of instructions.     PLAN:    Continue to enter in all carbs into bolus calculator try to bolus 15 mins before meals    Pump setting changed today I:C ratio is 1 unit for 28 grams of carb    Call if getting low blood sugars    FOLLOW-UP:    1/17/23 @ 11am virtual visit with Hannah Shook RN/STEPHEN ZUNIGA, RN, [facility]  Certified Diabetes Care and   [facility] Endocrinology and Diabetes   Clinics and Surgery Center  [address] [place] [address]  Phone [phone]      Time spent with patient at today's visit was 30 minutes.      Any diabetes medication dose changes were made via the CDE Protocol and Collaborative Practice Agreement with [place] and  [facility].  A copy of this encounter was provided to patient's referring provider.

## 2022-12-15 NOTE — TELEPHONE ENCOUNTER
[facility] Call Center    Phone Message    May a detailed message be left on voicemail: yes     Reason for Call: Order(s): Other: Lab Orders  Reason for requested: 5/2 appt   Date needed: Before 5/2  Provider name: Divina    Please add lab orders for 5/2 appointment with Dr. Murcia. Pt stated she will be going to a [facility] Lab closer to home to have the labs drawn prior to the 5/2 appointment. Please call patient if you have any questions. Thank you!      Action Taken: Nephrology    Travel Screening: Not Applicable

## 2022-12-15 NOTE — TELEPHONE ENCOUNTER
Hannah Murcia MD Bratsch, Angie J RN  Caller: Unspecified (Today, 12:05 PM)  Renal panel   Hemoglobin   Magnesium   Dx recurrent kidney stones, hypokalemia, anemia nos.     I see Dr. Rome started on citrate, would be nice to have a repeat litholink prior to visit to see if citrate improved.     Thanks   Hannah Murcia MD     Spoke to pt and informed of ordered labs. Also informed her of need for 24 hour urine test and a kit will be mailed out to her. Pt verbalized understanding.       Adwoa Tan, MSN RN

## 2022-12-21 PROBLEM — M62.89 PELVIC FLOOR DYSFUNCTION: Status: RESOLVED | Noted: 2022-07-27 | Resolved: 2022-12-21

## 2022-12-21 NOTE — PROGRESS NOTES
Discharge Note    Progress reporting period is from last progress note on 11/03/22 to Nov 10, 2022.    Lynn failed to follow up and current status is unknown.  Please see information below for last relevant information on current status.  Patient seen for 8 visits.    SUBJECTIVE  Subjective changes noted by patient:  pt reports she has a pregnant dog at her house. Benson is going better than it used to, less pain.  .  Current pain level is  .     Previous pain level was   .   Changes in function:  Yes (See Goal flowsheet attached for changes in current functional level)  Adverse reaction to treatment or activity: None    OBJECTIVE  Changes noted in objective findings: improved tension on exam, still some leaking but improved. Progressed Exercises     ASSESSMENT/PLAN  Diagnosis: dysparunia, mixed UI and fecal incontinence   Updated problem list and treatment plan:   Pain - HEP  Decreased ROM/flexibility - HEP  Decreased function - HEP  Decreased strength - HEP  STG/LTGs have been met or progress has been made towards goals:  Yes, please see goal flowsheet for most current information  Assessment of Progress: current status is unknown.    Last current status: Pt is progressing as expected   Self Management Plans:  HEP  I have re-evaluated this patient and find that the nature, scope, duration and intensity of the therapy is appropriate for the medical condition of the patient.  Lynn continues to require the following intervention to meet STG and LTG's:  HEP.    Recommendations:  Discharge with current home program.  Patient to follow up with MD as needed.    Please refer to the daily flowsheet for treatment today, total treatment time and time spent performing 1:1 timed codes.

## 2022-12-27 ENCOUNTER — MEDICAL CORRESPONDENCE (OUTPATIENT)
Dept: HEALTH INFORMATION MANAGEMENT | Facility: CLINIC | Age: 57
End: 2022-12-27

## 2022-12-28 ENCOUNTER — MYC MEDICAL ADVICE (OUTPATIENT)
Dept: EDUCATION SERVICES | Facility: CLINIC | Age: 57
End: 2022-12-28

## 2022-12-28 DIAGNOSIS — E13.9 POST-PANCREATECTOMY DIABETES (H): Primary | ICD-10-CM

## 2022-12-28 DIAGNOSIS — E89.1 POST-PANCREATECTOMY DIABETES (H): Primary | ICD-10-CM

## 2022-12-28 DIAGNOSIS — Z90.410 POST-PANCREATECTOMY DIABETES (H): Primary | ICD-10-CM

## 2022-12-28 NOTE — TELEPHONE ENCOUNTER
Contacted patient to review.    Lost controller Monday evening. Has not left house since then. Has iphone so cannot use phone arun to locate PDM.     Had severe low yesterday evening around 6:15 PM. Family corrected with 1 cup of juice and 3-4 clementines. Patient does not remember event. She took Fiasp 2.5 units at 12:30 pm and 2 units at 4:00 pm for correction. She had another severe low this morning around 7:40 and had not had any additional insulin except for basal through pump. She is feeling better at time of call. Prescription for Lantus completed to [facility]. Patient has Fiasp pen and needles at home.    POD change due today.    TDD per [facility] report=13 units    Plan:  *Remove POD. Wait at least 1.5 hours and then take Lantus-6 units  *Use correction scale: 200-350=1 unit, 351-450= 2 units  *Take 1 unit per 30 grams of carbs  *Call [employer] support  #[phone] to order replacement pump  *Continue wearing Dexcom sensor  *Contact clinic when you receive new controller/before starting new POD    Plan reviewed with ISAK Bellamy, RN    Rachel Izaguirre, RN Diabetes Educator  Diabetes Education Department  [facility], [facility]  [phone]

## 2022-12-30 NOTE — TELEPHONE ENCOUNTER
Contacted patient for update.    She found PDM Wednesday evening.    She is still having lows overnight. She treated with yogurt and cream of wheat. Did not confirm low with fingerstick. Reviewed 15:15 rule. Reminded if <55 to treat with glucose gel, or 8 oz of juice or pop. Will lower basal rate from midnight to 8am from 0.25 to 0.2. Adjustment made with patient via phone.     Will forward to ISAK Barron, RN to review next week.    Insulin adjustment reviewed with ISAK Peralta, RN.    Plan:  *Add/lower basal rate from midnight to 8 am to 0.2. From 8 am to midnight 0.25.  *Try to confirm low with a fingerstick  *If blood sugar is below 55, treat with glucose gel or 8 oz of juice or pop.  *If blood sugar is 55-70, treat with 4 oz juice or pop  *On-Call Endocrinologist [phone]  *Call [employer] to see if can return replacement PDM    Rachel Izaguirre, SATISH Diabetes Educator  Diabetes Education Department  [facility], [facility]  [phone]

## 2023-01-03 NOTE — TELEPHONE ENCOUNTER
Spoke to pt due to overnight lows she will reduce 12am basal rate to 0.15u hr. CDE will check in on patient on Monday.     Hannah ZUNIGA, RN, [facility]  Certified Diabetes Care and   [facility] Endocrinology and Diabetes  [facility]  [address] [place] [address]  Phone [phone]

## 2023-01-05 ENCOUNTER — LAB (OUTPATIENT)
Dept: LAB | Facility: CLINIC | Age: 58
End: 2023-01-05
Payer: COMMERCIAL

## 2023-01-05 DIAGNOSIS — E89.1 POST-PANCREATECTOMY DIABETES (H): ICD-10-CM

## 2023-01-05 DIAGNOSIS — E03.9 ACQUIRED HYPOTHYROIDISM: ICD-10-CM

## 2023-01-05 DIAGNOSIS — D64.9 ANEMIA: ICD-10-CM

## 2023-01-05 DIAGNOSIS — K86.81 EXOCRINE PANCREATIC INSUFFICIENCY: ICD-10-CM

## 2023-01-05 DIAGNOSIS — E87.6 HYPOKALEMIA: ICD-10-CM

## 2023-01-05 DIAGNOSIS — D50.9 IRON DEFICIENCY ANEMIA, UNSPECIFIED IRON DEFICIENCY ANEMIA TYPE: ICD-10-CM

## 2023-01-05 DIAGNOSIS — N20.0 KIDNEY STONE ON LEFT SIDE: ICD-10-CM

## 2023-01-05 DIAGNOSIS — E13.9 POST-PANCREATECTOMY DIABETES (H): ICD-10-CM

## 2023-01-05 DIAGNOSIS — Z90.410 POST-PANCREATECTOMY DIABETES (H): ICD-10-CM

## 2023-01-05 LAB
ALBUMIN SERPL BCG-MCNC: 4 G/DL (ref 3.5–5.2)
ALP SERPL-CCNC: 111 U/L (ref 35–104)
ALT SERPL W P-5'-P-CCNC: 49 U/L (ref 10–35)
ANION GAP SERPL CALCULATED.3IONS-SCNC: 14 MMOL/L (ref 7–15)
AST SERPL W P-5'-P-CCNC: 46 U/L (ref 10–35)
BASOPHILS # BLD AUTO: 0.1 10E3/UL (ref 0–0.2)
BASOPHILS NFR BLD AUTO: 1 %
BILIRUB SERPL-MCNC: 0.2 MG/DL
BUN SERPL-MCNC: 21.9 MG/DL (ref 8–23)
CALCIUM SERPL-MCNC: 9 MG/DL (ref 8.6–10)
CHLORIDE SERPL-SCNC: 106 MMOL/L (ref 98–107)
CHOLEST SERPL-MCNC: 230 MG/DL
CREAT SERPL-MCNC: 0.94 MG/DL (ref 0.51–0.95)
DEPRECATED HCO3 PLAS-SCNC: 21 MMOL/L (ref 22–29)
EOSINOPHIL # BLD AUTO: 0.1 10E3/UL (ref 0–0.7)
EOSINOPHIL NFR BLD AUTO: 1 %
ERYTHROCYTE [DISTWIDTH] IN BLOOD BY AUTOMATED COUNT: 14.7 % (ref 10–15)
FERRITIN SERPL-MCNC: 34 NG/ML (ref 11–328)
GFR SERPL CREATININE-BSD FRML MDRD: 70 ML/MIN/1.73M2
GLUCOSE SERPL-MCNC: 111 MG/DL (ref 70–99)
HCT VFR BLD AUTO: 36.2 % (ref 35–47)
HDLC SERPL-MCNC: 97 MG/DL
HGB BLD-MCNC: 11.7 G/DL (ref 11.7–15.7)
IRON BINDING CAPACITY (ROCHE): 310 UG/DL (ref 240–430)
IRON SATN MFR SERPL: 29 % (ref 15–46)
IRON SERPL-MCNC: 90 UG/DL (ref 37–145)
LDLC SERPL CALC-MCNC: 115 MG/DL
LYMPHOCYTES # BLD AUTO: 2.8 10E3/UL (ref 0.8–5.3)
LYMPHOCYTES NFR BLD AUTO: 35 %
MAGNESIUM SERPL-MCNC: 1.8 MG/DL (ref 1.7–2.3)
MCH RBC QN AUTO: 30 PG (ref 26.5–33)
MCHC RBC AUTO-ENTMCNC: 32.3 G/DL (ref 31.5–36.5)
MCV RBC AUTO: 93 FL (ref 78–100)
MONOCYTES # BLD AUTO: 0.7 10E3/UL (ref 0–1.3)
MONOCYTES NFR BLD AUTO: 9 %
NEUTROPHILS # BLD AUTO: 4.1 10E3/UL (ref 1.6–8.3)
NEUTROPHILS NFR BLD AUTO: 53 %
NONHDLC SERPL-MCNC: 133 MG/DL
PHOSPHATE SERPL-MCNC: 4.2 MG/DL (ref 2.5–4.5)
PLATELET # BLD AUTO: 387 10E3/UL (ref 150–450)
POTASSIUM SERPL-SCNC: 4.2 MMOL/L (ref 3.4–5.3)
PROT SERPL-MCNC: 6.3 G/DL (ref 6.4–8.3)
RBC # BLD AUTO: 3.9 10E6/UL (ref 3.8–5.2)
SODIUM SERPL-SCNC: 141 MMOL/L (ref 136–145)
T4 FREE SERPL-MCNC: 0.71 NG/DL (ref 0.9–1.7)
TRIGL SERPL-MCNC: 89 MG/DL
TSH SERPL DL<=0.005 MIU/L-ACNC: 6.56 UIU/ML (ref 0.3–4.2)
WBC # BLD AUTO: 7.8 10E3/UL (ref 4–11)

## 2023-01-05 PROCEDURE — 36415 COLL VENOUS BLD VENIPUNCTURE: CPT

## 2023-01-05 PROCEDURE — 82728 ASSAY OF FERRITIN: CPT

## 2023-01-05 PROCEDURE — 83540 ASSAY OF IRON: CPT

## 2023-01-05 PROCEDURE — 83550 IRON BINDING TEST: CPT

## 2023-01-05 PROCEDURE — 80050 GENERAL HEALTH PANEL: CPT

## 2023-01-05 PROCEDURE — 84439 ASSAY OF FREE THYROXINE: CPT

## 2023-01-05 PROCEDURE — 80061 LIPID PANEL: CPT

## 2023-01-05 PROCEDURE — 84100 ASSAY OF PHOSPHORUS: CPT

## 2023-01-05 PROCEDURE — 83735 ASSAY OF MAGNESIUM: CPT

## 2023-01-06 ENCOUNTER — PRE VISIT (OUTPATIENT)
Dept: UROLOGY | Facility: CLINIC | Age: 58
End: 2023-01-06

## 2023-01-06 NOTE — TELEPHONE ENCOUNTER
Reason for visit: six month symptom check      Relevant information: Uterosacral ligament suspension    Records/imaging/labs/orders: all records available    Pt called: no need for a call    At Rooming: collect a urine/pvr, undress for exam      Ladan Vitale CMA  1/6/2023  10:16 AM

## 2023-01-12 ENCOUNTER — OFFICE VISIT (OUTPATIENT)
Dept: UROLOGY | Facility: CLINIC | Age: 58
End: 2023-01-12
Payer: COMMERCIAL

## 2023-01-12 VITALS
BODY MASS INDEX: 20.83 KG/M2 | HEIGHT: 64 IN | WEIGHT: 122 LBS | HEART RATE: 79 BPM | DIASTOLIC BLOOD PRESSURE: 85 MMHG | SYSTOLIC BLOOD PRESSURE: 154 MMHG

## 2023-01-12 DIAGNOSIS — Z87.42 HISTORY OF UTERINE PROLAPSE: ICD-10-CM

## 2023-01-12 DIAGNOSIS — N95.2 VAGINAL ATROPHY: ICD-10-CM

## 2023-01-12 DIAGNOSIS — M62.89 HIGH-TONE PELVIC FLOOR DYSFUNCTION: ICD-10-CM

## 2023-01-12 DIAGNOSIS — N95.2 ATROPHIC VAGINITIS: ICD-10-CM

## 2023-01-12 DIAGNOSIS — N39.41 URGE INCONTINENCE: ICD-10-CM

## 2023-01-12 DIAGNOSIS — Z98.890 H/O OF RECTOPEXY: Primary | ICD-10-CM

## 2023-01-12 PROCEDURE — 51798 US URINE CAPACITY MEASURE: CPT | Performed by: UROLOGY

## 2023-01-12 PROCEDURE — 99214 OFFICE O/P EST MOD 30 MIN: CPT | Mod: 25 | Performed by: UROLOGY

## 2023-01-12 RX ORDER — ESTRADIOL 0.1 MG/G
CREAM VAGINAL
Qty: 42.5 G | Refills: 11 | Status: SHIPPED | OUTPATIENT
Start: 2023-01-12 | End: 2024-03-04

## 2023-01-12 ASSESSMENT — PAIN SCALES - GENERAL: PAINLEVEL: NO PAIN (0)

## 2023-01-12 NOTE — NURSING NOTE
"Chief Complaint   Patient presents with     Follow Up     Symptom check - 80% improvement of symptoms       Blood pressure (!) 154/85, pulse 79, height 1.626 m (5' 4\"), weight 55.3 kg (122 lb), last menstrual period 12/19/2013, not currently breastfeeding. Body mass index is 20.94 kg/m .    Patient Active Problem List   Diagnosis     Iron deficiency anemia secondary to inadequate dietary iron intake     Gastric bypass status for obesity     Health Care Home     Chronic pain syndrome     Exocrine pancreatic insufficiency     Asplenia     BLU (obstructive sleep apnea)     H/O of rectopexy     Dysthymia     Anxiety     Thyroid nodule     Acquired hypothyroidism     Post-pancreatectomy diabetes (H)     Other iron deficiency anemia     Uterovaginal prolapse, unspecified     Rectal prolapse     Small bowel obstruction (H)     Urge incontinence     Urinary urgency     High-tone pelvic floor dysfunction     Pyuria     Kidney stone on left side       Allergies   Allergen Reactions     Corticosteroids Other (See Comments)     All oral, IV and injectable steroids are contraindicated (unless in life threatening situations) in Islet Auto transplant recipients. They can cause irreversible loss of islet cell function. Please contact patient's transplant care coordinator, Erlinda Multani RN BSN at [phone]/pager: [phone] and endocrinologist prior to administration.       Povidone Iodine Hives     Causes skin to blister     Nsaids      naprosyn = GI upset     Sulfasalazine Nausea and Nausea and Vomiting       Current Outpatient Medications   Medication Sig Dispense Refill     calcium carbonate 600 mg-vitamin D 400 units (CALTRATE) 600-400 MG-UNIT per tablet Take 1 tablet by mouth daily       Continuous Blood Gluc Sensor (DEXCOM G6 SENSOR) MISC 1 each every 10 days Change every 10 days. 3 each 5     Continuous Blood Gluc Transmit (DEXCOM G6 TRANSMITTER) MISC 1 each every 3 months Change every 3 months. 1 each 1     " DULoxetine (CYMBALTA) 20 MG capsule Take 20 mg by mouth daily       escitalopram (LEXAPRO) 20 MG tablet Take 20 mg by mouth daily       estradiol (ESTRACE) 0.1 MG/GM vaginal cream PLACE 2 GRAMS VAGINALLY 2 TIMES WEEKLY 42.5 g 1     famotidine (PEPCID) 40 MG tablet Take 1 tablet (40 mg) by mouth 2 times daily as needed for heartburn 180 tablet 3     FIASP PENFILL 100 UNIT/ML SOCT Inject 0.5-4 Units Subcutaneous 4 times daily, INJECT with meals and nightly 15 mL 0     gabapentin (NEURONTIN) 300 MG capsule Take 300 mg by mouth nightly as needed       Glucagon (GVOKE HYPOPEN 1-PACK) 1 MG/0.2ML SOAJ Inject 1 mg Subcutaneous once as needed (for hypoglycemia with loss of consciousness) 15 mL 5     glucose 40 % GEL Take 15-30 g by mouth every 15 minutes as needed. 1 Tube 11     hydrOXYzine (ATARAX) 25 MG tablet TAKE 1-2 TABLETS BY MOUTH 2 TIMES DAILY AS NEEDED FOR ANXIETY  0     Insulin Aspart, w/Niacinamide, (FIASP) 100 UNIT/ML SOLN 15 Units by Subcutaneous Infusion route daily Up to 85 units in continuous insulin pump 90 mL 1     Insulin Disposable Pump (OMNIPOD 5 G6 INTRO, GEN 5,) KIT 1 each continuous 1 kit 0     Insulin Disposable Pump (OMNIPOD 5 G6 POD, GEN 5,) MISC 1 each every 3 days 30 each 5     insulin glargine (LANTUS PEN) 100 UNIT/ML pen Inject 6 units daily in the event of pump failure 15 mL 0     insulin pen needle (32G X 4 MM) 32G X 4 MM miscellaneous Use 4-6 pen needles daily to inject subcutaneous insulin 200 each 11     levothyroxine (SYNTHROID/LEVOTHROID) 100 MCG tablet Take 1 tablet (100 mcg) by mouth daily (Patient taking differently: Take 150 mcg by mouth daily) 90 tablet 3     lipase-protease-amylase (VIOKACE) 28627-37940 units TABS tablet Take 5 with meals and 3 with snacks; approximately daily maximum of 20 capsules 500 tablet 11     loratadine (CLARITIN) 10 MG tablet Take 10 mg by mouth daily as needed        modafinil (PROVIGIL) 200 MG tablet Take 400 mg by mouth daily       omeprazole (PRILOSEC)  40 MG DR capsule Take 1 capsule (40 mg) by mouth 2 times daily Take 30-60 minutes before a meal. 180 capsule 3     potassium citrate-citric acid (CYTRA K) 6953-8083 MG Packet Take 1 packet by mouth 4 times daily (before meals and nightly) 100 packet 11     traZODone (DESYREL) 50 MG tablet Take 50 mg by mouth nightly as needed for sleep         Social History     Tobacco Use     Smoking status: Never     Smokeless tobacco: Never   Vaping Use     Vaping Use: Never used   Substance Use Topics     Alcohol use: Not Currently     Drug use: Not Currently       Natividadshun Tavarez  1/12/2023  9:30 AM

## 2023-01-12 NOTE — PATIENT INSTRUCTIONS
Websites with free information:    [employer] patient website: www.voicesforpfd.org    Total Control Program: www.totalcontrolprogram.com    Continue the estrogen cream    Return in one year, sooner if needed    It was a pleasure meeting with you today.  Thank you for allowing me and my team the privilege of caring for you today.  YOU are the reason we are here, and I truly hope we provided you with the excellent service you deserve.  Please let us know if there is anything else we can do for you so that we can be sure you are leaving completely satisfied with your care experience.

## 2023-01-12 NOTE — LETTER
"1/12/2023       RE: Lynn Thompson  [address] [facility] [id number]     Dear Colleague,    Thank you for referring your patient, Lynn Thompson, to the [facility] UROLOGY CLINIC [place] at [facility]. Please see a copy of my visit note below.    January 12, 2023    Lynn was seen today for follow up.    Diagnoses and all orders for this visit:    H/O of rectopexy    History of uterine prolapse    High-tone pelvic floor dysfunction    Urge incontinence    History of rectppexy, TERI with USLS and Gan with urgency incontinence improved with PT    Continue vaginal estrogen    RTC one year, sooner if needed    15 minutes were spent today on the day of the encounter in reviewing the EMR, direct patient care including prescription medications, coordination of care and documentation    Nicole Rivera MD MPH  (she/her/hers)   of Urology  [facility]      Subjective    Here today for PT follow.  80% better with PT.  Using the estrogen cream when she remembers.  Has been busy caring for a litter of labordoodle puppies.  She denies any changes in health since last visit    BP (!) 154/85   Pulse 79   Ht 1.626 m (5' 4\")   Wt 55.3 kg (122 lb)   LMP 12/19/2013   BMI 20.94 kg/m    GENERAL: healthy, alert and no distress  EYES: Eyes grossly normal to inspection, conjunctivae and sclerae normal  HENT: normal cephalic/atraumatic.  External ears, nose and mouth without ulcers or lesions.  RESP: no audible wheeze, cough, or visible cyanosis.  No visible retractions or increased work of breathing.  Able to speak fully in complete sentences.  NEURO: Cranial nerves grossly intact, mentation intact and speech normal  PSYCH: mentation appears normal, affect normal/bright, judgement and insight intact, normal speech and appearance well-groomed  ABD: soft, nontender,nondistended, well healed incisions  : Normal external genitalia.  " Speculum and bimanual exam remarkable for high tone pelvic floor, no foreign body    PVR 8 mL by bladder scan    CC  Patient Care Team:  Maryana Brooks MD as PCP - General (Family Practice)  Ana Davis MD as MD (Pulmonary Disease)  Bartolome Disla MD as MD (Neurology)  Rina Watt  Renard, Domonique HAILE, NP as Nurse Practitioner (Nurse Practitioner Psych/Mental Health)  Adriana Robles MD as Assigned Pediatric Specialist Provider  Maryana Brooks MD as Assigned PCP  Sabrina Rutledge APRN CNP as Nurse Practitioner (Colon & Rectal)  Liliya Urrutia MD as Assigned Cancer Care Provider  Nicole Rivera MD as MD (Urology)  Nicole Rivera MD as MD (Urology)  Charmaine Navarro, RN as Specialty Care Coordinator (Urology)  Charmaine Navarro, RN as Specialty Care Coordinator (Urology)  Maryana Brooks MD as Referring Physician (Family Medicine)  Nicole Rivera MD as Assigned Surgical Provider  Hannah Shook, RN as Diabetes Educator  Rogerio Heller MD as Assigned Neuroscience Provider  Nikunj Plummer MD as Assigned Musculoskeletal Provider  Hannah Murcia MD as MD (Nephrology)  Alonzo Rome MD as MD (Urology)  SELF, REFERRED

## 2023-01-12 NOTE — PROGRESS NOTES
"January 12, 2023    Lynn was seen today for follow up.    Diagnoses and all orders for this visit:    H/O of rectopexy    History of uterine prolapse    High-tone pelvic floor dysfunction    Urge incontinence    History of rectppexy, TERI with USLS and Gan with urgency incontinence improved with PT    Continue vaginal estrogen    RTC one year, sooner if needed    15 minutes were spent today on the day of the encounter in reviewing the EMR, direct patient care including prescription medications, coordination of care and documentation    Nicole Rivera MD MPH  (she/her/hers)   of Urology  [facility]      Subjective    Here today for PT follow.  80% better with PT.  Using the estrogen cream when she remembers.  Has been busy caring for a litter of labordoodle puppies.  She denies any changes in health since last visit    BP (!) 154/85   Pulse 79   Ht 1.626 m (5' 4\")   Wt 55.3 kg (122 lb)   LMP 12/19/2013   BMI 20.94 kg/m    GENERAL: healthy, alert and no distress  EYES: Eyes grossly normal to inspection, conjunctivae and sclerae normal  HENT: normal cephalic/atraumatic.  External ears, nose and mouth without ulcers or lesions.  RESP: no audible wheeze, cough, or visible cyanosis.  No visible retractions or increased work of breathing.  Able to speak fully in complete sentences.  NEURO: Cranial nerves grossly intact, mentation intact and speech normal  PSYCH: mentation appears normal, affect normal/bright, judgement and insight intact, normal speech and appearance well-groomed  ABD: soft, nontender,nondistended, well healed incisions  : Normal external genitalia.  Speculum and bimanual exam remarkable for high tone pelvic floor, no foreign body    PVR 8 mL by bladder scan    CC  Patient Care Team:  Maryana Brooks MD as PCP - General (Family Practice)  Ana Davis MD as MD (Pulmonary Disease)  Bartolome Disla MD as MD (Neurology)  Rina Watt  [facility], " Domonique HAILE NP as Nurse Practitioner (Nurse Practitioner Psych/Mental Health)  Adriana Robles MD as Assigned Pediatric Specialist Provider  Maryana Brooks MD as Assigned PCP  Sabrina Rutledge APRN CNP as Nurse Practitioner (Colon & Rectal)  Liliya Urrutia MD as Assigned Cancer Care Provider  Nicole Rivera MD as MD (Urology)  Nicole Rivera MD as MD (Urology)  Charmaine Navarro, RN as Specialty Care Coordinator (Urology)  Charmaine Navarro, RN as Specialty Care Coordinator (Urology)  Maryana Brooks MD as Referring Physician (Family Medicine)  Nicole Rivera MD as Assigned Surgical Provider  Hannah Shook, RN as Diabetes Educator  Rogerio Heller MD as Assigned Neuroscience Provider  Nikunj Plummer MD as Assigned Musculoskeletal Provider  Hannah Murcia MD as MD (Nephrology)  Alonzo Rome MD as MD (Urology)  SELF, REFERRED

## 2023-01-17 ENCOUNTER — VIRTUAL VISIT (OUTPATIENT)
Dept: EDUCATION SERVICES | Facility: CLINIC | Age: 58
End: 2023-01-17
Payer: MEDICARE

## 2023-01-17 DIAGNOSIS — E89.1 POST-PANCREATECTOMY DIABETES (H): Primary | ICD-10-CM

## 2023-01-17 DIAGNOSIS — E13.9 POST-PANCREATECTOMY DIABETES (H): Primary | ICD-10-CM

## 2023-01-17 DIAGNOSIS — Z90.410 POST-PANCREATECTOMY DIABETES (H): Primary | ICD-10-CM

## 2023-01-17 PROCEDURE — 99207 PR DIABETES SELF MANAGEMENT: CPT | Mod: 95

## 2023-01-17 NOTE — PROGRESS NOTES
Video-Visit Details    Type of service:  Video Visit  Patient consented to video visit  Video Start Time:  11  Video End Time:   1130  Originating Location (pt. 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    Patient is being treated with:  insulin pump  She is accompanied by self      Referring provider:  Dr Cardona  Dx: 2013    PATIENT CONCERNS RELATED TO DIABETES SELF MANAGEMENT: Follow up on  OP5       ASSESSMENT:    Taking Medication:     Current Diabetes Management per Patient:  Fiasp insulin in pump    Pump Settings:    Max Basal 1.0 unit(s)/hr    Basal Rates:  12a 0.15 unit(s)/hr  8a 0.25 units/hr     Target Glucose  12 a 120 Correct above 130    Sensitivity   12a  140    Insulin to Carb Ratio  12 am 28 grams    Max Bolus 6  Active insulin time 3 hours  Reverse correction off      Monitoring              Patient's most recent   Lab Results   Component Value Date    A1C 7.7 09/15/2022    A1C 6.8 05/10/2021      Patient's A1C goal: <7.0    Activity: Walks dogs, foster dogs    Healthy Eating:    Patient currently: 2 meals per day due to lack of appetite. History of gastric bypass 2001.     Breakfast - Coffee. 1/2 yogurt, no dose   Lunch - No, cheesesticks, apple with peanut butter  Dinner - Chicken, salad, rice, dessert-4 units  Snacks - Apples with peanut butter     Beverages: Coffee 1 cup in AM, diet coke       Problem Solving:      Patient is at risk of hypoglycemia?: YES  Hospitalizations for hyper or hypoglycemia: Unknown          EDUCATION and INSTRUCTION PROVIDED AT THIS VISIT:    Pt had 1x1 virtual visit for pump follow-up. Pt was a the [facility] clinic today and forgot her Controller for her pump, she is hoping its still there. She plans to go there after our visit. If  not she does have a 2nd Controller that she will program the new settings and start a new pod. Pt continues to have some lows overnight. She tends to go to bed high and then drops low overnight. Today we adjusted her Target BG and Sensitivity  from 12a-8a. Pt also is not always getting the best sensor readings. This is an ongoing issue with her Dexcom the last few days of the sensor. Pt is wearing Pod and transmitter on arm. CDE will review [facility] report in 1 week and contact pt      Back up insulin plan. Pt to take 5 units of Lantus or similar insulin for pump failure + 1 unit for every 30 grams of carb + correction 1  Unit for every 14 pts above 110.        Patient-stated goal written and given to Lynn Thompson.  Verbalized and demonstrated understanding of instructions.     PLAN:      Pump Settings: Changes in bold    Max Basal 1.0 unit(s)/hr    Basal Rates:  12a 0.15 unit(s)/hr  8a 0.25 units/hr     Target Glucose  12a-8a 130 Correct above 140  8a-12 a 120 Correct above 130    Sensitivity   12a -8a 160  8am-12a 140    Insulin to Carb Ratio  12 am 28 grams    Max Bolus 6  Active insulin time 3 hours  Reverse correction off      Call if getting low blood sugars    Follow up with CDE in 1 week, Hannah will reach out via phone or mychart after she reviews [facility] report    Hannah ZUNIGA, RN, [facility]  Certified Diabetes Care and   [facility] Endocrinology and Diabetes   Clinics and Surgery Center  [address] [place] [address]  Phone [phone]      Time spent with patient at today's visit was 30 minutes.      Any diabetes medication dose changes were made via the CDE Protocol and Collaborative Practice Agreement with [place] and  [facility].  A copy of this encounter was provided to patient's referring provider.

## 2023-01-18 DIAGNOSIS — E13.9 POST-PANCREATECTOMY DIABETES (H): Primary | ICD-10-CM

## 2023-01-18 DIAGNOSIS — E89.1 POST-PANCREATECTOMY DIABETES (H): Primary | ICD-10-CM

## 2023-01-18 DIAGNOSIS — Z90.410 POST-PANCREATECTOMY DIABETES (H): Primary | ICD-10-CM

## 2023-01-19 ENCOUNTER — LAB (OUTPATIENT)
Dept: LAB | Facility: CLINIC | Age: 58
End: 2023-01-19
Payer: MEDICARE

## 2023-01-19 ENCOUNTER — OFFICE VISIT (OUTPATIENT)
Dept: TRANSPLANT | Facility: CLINIC | Age: 58
End: 2023-01-19
Attending: PEDIATRICS
Payer: COMMERCIAL

## 2023-01-19 VITALS
DIASTOLIC BLOOD PRESSURE: 94 MMHG | SYSTOLIC BLOOD PRESSURE: 179 MMHG | OXYGEN SATURATION: 98 % | HEART RATE: 68 BPM | BODY MASS INDEX: 21.44 KG/M2 | WEIGHT: 124.9 LBS

## 2023-01-19 DIAGNOSIS — Z90.410 POST-PANCREATECTOMY DIABETES (H): ICD-10-CM

## 2023-01-19 DIAGNOSIS — E89.1 POST-PANCREATECTOMY DIABETES (H): ICD-10-CM

## 2023-01-19 DIAGNOSIS — E03.9 ACQUIRED HYPOTHYROIDISM: Primary | ICD-10-CM

## 2023-01-19 DIAGNOSIS — E13.9 POST-PANCREATECTOMY DIABETES (H): ICD-10-CM

## 2023-01-19 LAB — HBA1C MFR BLD: 6.5 %

## 2023-01-19 PROCEDURE — 99214 OFFICE O/P EST MOD 30 MIN: CPT | Performed by: PEDIATRICS

## 2023-01-19 PROCEDURE — 36415 COLL VENOUS BLD VENIPUNCTURE: CPT | Performed by: PATHOLOGY

## 2023-01-19 PROCEDURE — G0463 HOSPITAL OUTPT CLINIC VISIT: HCPCS | Performed by: PEDIATRICS

## 2023-01-19 PROCEDURE — 83036 HEMOGLOBIN GLYCOSYLATED A1C: CPT | Performed by: PEDIATRICS

## 2023-01-19 NOTE — PROGRESS NOTES
[facility]  Islet Autotransplant, Diabetes Follow Up    Problem List:  Patient Active Problem List   Diagnosis     Iron deficiency anemia secondary to inadequate dietary iron intake     Gastric bypass status for obesity     Health Care Home     Chronic pain syndrome     Exocrine pancreatic insufficiency     Asplenia     BLU (obstructive sleep apnea)     H/O of rectopexy     Dysthymia     Anxiety     Thyroid nodule     Acquired hypothyroidism     Post-pancreatectomy diabetes (H)     Other iron deficiency anemia     Uterovaginal prolapse, unspecified     Rectal prolapse     Small bowel obstruction (H)     Urge incontinence     Urinary urgency     High-tone pelvic floor dysfunction     Pyuria     Kidney stone on left side     History of uterine prolapse     Vaginal atrophy       HPI:  Lynn is a 57 year old female here for follow up o total pancreatectomy, islet cell autotransplant, splenectomy, liver biopsy (needle core), choledochoduodenostomy, feeding jejunostomy performed on 5/10/2013.  At the time of the procedure, the patient received 178,100 IEQ, or 3,209 IEQ/kg body weight. She has had two subsequent exploratory laparatomy for small bowel obstruction. Other notable endocrine history includes a complex cystic nodule in mid right thyroid lobe with 1 cm maximum diameter (saw Dr Adorno in 11/2016) and mild hypothyroidism followed by PCP, pathology in 2018 by FNA showed a benign nodule (includes adenomatoid nodule, colloid nodule, etc.).  She had an episode of cholangitis and was hospitalized for 4 days 8/24/17 (fevers, RUQ pain, + Ecoli blood cx).    She was on NO insulin at her 1 year, 2 year, 3 year, 4 year transplant anniversaries and restart insulin just after 4 years post-tx, insulin restart date of  6/6/2017.   She had a slow opioid wean off suboxone but eventually did come off.     Other history notable for surgical procedure Admitted from 2/1- 2/5/22 for  1. Posterior  suture rectopexy (Colorectal primary)  2. Sigmoidoscopy (Colorectal primary)  3. Supracervical hysterectomy with bilateral salpingooophrectomy (Uro)  4. Uterosacral ligament suspension (Uro)  5. Gan colposuspension (Uro)  6. Cystoscopy (Uro)        1)  Diabetes:  Lynn continues to have partial islet graft function.   She is on Fiasp right before meal time and has rapid gastric emptying.    Started on Omnipod 5 because of hyper and hypoglycemia, with extreme variability and A1c not at goal, despite optimization of MDI regimen.  Overall does seem to be doing better on this regimen.  She started this shortly after I saw her last a few months ago, late 2022.  She had one episode of not being able to find her controller and actually purchased a new one.  She has had some issues with early Dexcom G6 sensor failures and has talked to the company and has a plan for getting additional supply in case of pump failure.   Notices with better visibility of pump and sensor she is better able to adjust what she is eating for better control.   Today's A1c is pending    Pump, updated settings as of 1/17  Pump Settings: Changes in bold from 2 days ago     Max Basal 1.0 unit(s)/hr     Basal Rates:  12a 0.15 unit(s)/hr  8a 0.25 units/hr      Target Glucose  12a-8a 130 Correct above 140  8a-12 a 120 Correct above 130     Sensitivity   12a -8a 160  8am-12a 140     Insulin to Carb Ratio  12 am 28 grams     Max Bolus 6  Active insulin time 3 hours  Reverse correction off        Since changes made        Lab Results   Component Value Date    A1C 7.7 09/15/2022    A1C 8.2 05/08/2022    A1C 6.8 01/18/2022    A1C 6.5 10/07/2021    A1C 7.0 09/03/2021    A1C 6.8 05/10/2021    A1C 6.9 02/16/2021    A1C 6.7 08/20/2020    A1C 7.1 06/05/2020    A1C 6.9 02/06/2020       Hypoglycemia history:   Recent history as above.  The patient has had NO episodes of severe hypoglycemia (seizure, coma, or neuroglycopenic symptoms severe enough to require  assistance from another person).  Blood sugars were reviewed from the patient records and/or the meter download.      Uses Dexcom G6-Better after pump start.   MORE TIME IN RANGE THAN PRE PUMP (was 55% of time at ).  Is having some hypoglycemia.  Still post meal hyperglycemia but it is more brief    Patient is good candidate for CGM therapy.  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   --  Require frequent adjustments of the insulin treatment regimen, based on therapeutic CGM test results      2)  Nutrition:  Weight loss has actually been stable, still thin but appears not to be actively losing     Last nutritional studies:  Component      Latest Ref Rng & Units 9/6/2022 9/15/2022   Iron      37 - 145 ug/dL 69    Iron Sat Index      15 - 46 % 27    Iron Binding Capacity      240 - 430 ug/dL 255    Vitamin A      0.30 - 1.20 mg/L  0.49   Retinol Palmitate      0.00 - 0.10 mg/L  <0.02   Vitamin A Interp        Normal   25 OH Vit D2      ug/L  <5   25 OH Vit D3      ug/L  58   25 OH Vit D total      20 - 75 ug/L  <63   Vitamin E      5.5 - 18.0 mg/L  10.3   Vitamin E Gamma      0.0 - 6.0 mg/L  0.9   Vitamin B12      232 - 1,245 pg/mL  418       3)  Thyroid:  Recent TFTs showed elevated TSH 6.5 with low FT4 0.71.  She has known acquired hypothyroidism of unclear etiology, and has been on treatment for about 7 years.  Current dose of levothyroxine is 150 mcg.  However, she was on 100 mcg at time of lab draw and that change was made in response to the abnormal TFTs.  She has noticed drier skin and hair changes.  She has not  noted any problems to suggest new malabsorption.  Stools are more formed than in the past, no steatorrhea.       Review of systems:  A complete Review Of Systems was performed but was negative except as noted in the HPI.    Past Medical and Surgical History:  Past Medical History:   Diagnosis Date     Benign paroxysmal positional vertigo     occ.      Calculus of kidney 05/2005    x1 on L side passed, several stones.  Has been tested for oxalate.     Chronic abdominal pain 2013     Chronic pancreatitis 2013     Depression     also occ panic spells     Diabetes (H) 5/10/2013    Total Pancreatomy with Auto Islets Transplant     Dyspepsia 1999    H. pylori   treated     Headaches     still periodic HA's ;  often 5X/week     Hypertension 2016    Stress related     Iron deficiency anemia 2003    relates to gastric bypass     Post-pancreatectomy diabetes melltius 2013     Sleep apnea     uses splint     Spasm of sphincter of Oddi     [place] [facility] [place]     Thyroid nodule 2016     Past Surgical History:   Procedure Laterality Date     ABDOMINOPLASTY  2002    Tummy tuck     APPENDECTOMY  1990     BUNIONECTOMY Right 1998     CBD Stent placement  2002    CBD stent; Dr. Presley      SECTION       CHOLECYSTECTOMY       COLONOSCOPY N/A 2021    Procedure: COLONOSCOPY INCOMPLETE Aborted due to incomplete prep  will need to take additional prep and return tomorrow 21;  Surgeon: Ihsan Saenz MD;  Location: [facility]     COMBINED CYSTOSCOPY, RETROGRADES, URETEROSCOPY, LASER HOLMIUM LITHOTRIPSY URETER(S), INSERT STENT Right 2015    Procedure: COMBINED CYSTOSCOPY, RETROGRADES, URETEROSCOPY, LASER HOLMIUM LITHOTRIPSY URETER(S), INSERT STENT;  Surgeon: Kennedi Aldana MD;  Location: [facility]     COMBINED CYSTOSCOPY, RETROGRADES, URETEROSCOPY, LASER HOLMIUM LITHOTRIPSY URETER(S), INSERT STENT Right 2015     Procedure: COMBINED CYSTOSCOPY, RETROGRADES, URETEROSCOPY, LASER HOLMIUM LITHOTRIPSY URETER(S), INSERT STENT;  Surgeon: Kennedi Aldana MD;  [facility]: [facility]     COSMETIC SURGERY  6/24/2002    Tummy tuck     CYSTECTOMY OVARIAN BENIGN Right      CYSTOSCOPY, RETROGRADES, INSERT STENT URETER(S), COMBINED  10/02/2012    Procedure: COMBINED CYSTOSCOPY, RETROGRADES, INSERT STENT URETER(S);  COMBINED CYSTOSCOPY,  , INSERT LEFT STENT URETER;  Surgeon: Johny Baez MD;  Location: [facility]     CYSTOSCOPY, RETROGRADES, INSERT STENT URETER(S), COMBINED Left 9/20/2022    Procedure: Cystoscopy with left retrograde pyelogram, left ureteroscopy, thulium laser lithotripsy of left ureteral calculus, stone basketing and left ureteral stent insertion;  Surgeon: Alonzo Rome MD;  Location: [facility]     ESOPHAGOSCOPY, GASTROSCOPY, DUODENOSCOPY (EGD), COMBINED N/A 01/24/2018    Procedure: COMBINED ESOPHAGOSCOPY, GASTROSCOPY, DUODENOSCOPY (EGD);  ESOPHAGOSCOPY, GASTROSCOPY, DUODENOSCOPY (EGD)    ;  Surgeon: Tamir Rodgers MD;  Location: [facility]     EXTRACORPOREAL SHOCK WAVE LITHOTRIPSY (ESWL)  10/16/2012    Procedure: EXTRACORPOREAL SHOCK WAVE LITHOTRIPSY (ESWL);  left EXTRACORPOREAL SHOCK WAVE LITHOTRIPSY (ESWL) ;  Surgeon: Johny Baez MD;  Location: [facility]     Gastric bypass NOS       HERNIA REPAIR  02/2015     HYSTERECTOMY SUPRACERVICAL, BILATERAL SALPINGO-OOPHORECTOMY, COMBINED N/A 02/01/2022    Procedure: Abdominal supracervical hysterectomy, bilateral salpingooophrectomy;  Surgeon: Nicole Rivera MD;  [facility]     IRRIGATION AND DEBRIDEMENT HAND, COMBINED Left 10/30/2020    Procedure: Left hand sharp excisional debridement of skin, subcutaneous tissue and fat with a scalpel, 2 x 1 x 1 cm.;  Surgeon: Demian Renteria MD;  Location: [facility]     LAPAROSCOPIC LYSIS ADHESIONS N/A 02/20/2015    Procedure: LAPAROSCOPIC LYSIS ADHESIONS;  Surgeon: Aaron Early MD;  [facility]     LAPAROSCOPIC LYSIS  ADHESIONS N/A 2015    Procedure: LAPAROSCOPIC LYSIS ADHESIONS;  Surgeon: Aaron Eraly MD;  [facility]     PANCREATECTOMY, TRANSPLANT AUTO ISLET CELL, COMBINED  05/10/2013    Procedure: COMBINED PANCREATECTOMY, TRANSPLANT AUTO ISLET CELL;  Pancreatectomy, Auto Islet Cell Transplant   hernia repair, jejunostomy tube and liver biopsies with Anesthesia General with block;  Surgeon: Aaron Early MD;  [facility]     Partial ileum resection       RECTOPEXY ABDOMINAL N/A 2022    Procedure: RECTOPEXY, ABDOMINAL;  Surgeon: Uriah Sheridan MD;  [facility]     REPAIR PTOSIS BROW BILATERAL Bilateral 2020    Procedure: BILATERAL BROW PTOSIS REPAIR;  Surgeon: Denise Alberts MD;  Location: [facility]     SACROCOLPOPEXY, CYSTOSCOPY, COMBINED N/A 2022    Procedure: Uterosacral ligament suspension, pina colposuspension with Cystoscopy;  Surgeon: Nicole Rivera MD;  [facility]     SIGMOIDOSCOPY FLEXIBLE N/A 2022    Procedure: SIGMOIDOSCOPY, FLEXIBLE;  Surgeon: Uriah Sheridan MD;  [facility]     Surgery for SBO       TONSILLECTOMY, ADENOIDECTOMY, COMBINED  1997     TRANSPLANT  5/10/13    Pancreatic Auto-Islet Transplant       Family History:  New changes since last visit:  none  Family History   Problem Relation Age of Onset     Family History Negative Mother      Respiratory Father         COPD;  at 69     Genitourinary Problems Father         kidney stones     Substance Abuse Father      Depression Father      Asthma Father      Heart Disease Paternal Grandfather         M.I.     Coronary Artery Disease Paternal Grandfather      Hyperlipidemia Paternal Grandfather      Genitourinary Problems Brother         multiple brothers with kidney stones     Gastrointestinal Disease Maternal Grandmother         undiagnosed 'gut' issues     Coronary Artery Disease Maternal Grandfather      Hyperlipidemia Maternal Grandfather       Cerebrovascular Disease Paternal Grandmother         At the age of 103     Anxiety Disorder Paternal Grandmother      Osteoporosis Paternal Grandmother      Anxiety Disorder Son      Anxiety Disorder Daughter      Asthma Daughter      Colon Cancer No family hx of        Social History:  Social History     Social History Narrative     Not on file     Does not work currently.  Mom to many foster dogs     Physical Exam:  Vitals: BP (!) 179/94   Pulse 68   Wt 56.7 kg (124 lb 14.4 oz)   LMP 12/19/2013   SpO2 98%   BMI 21.44 kg/m    BMI= Body mass index is 21.44 kg/m .  General:  Appearance is normal, no acute distress  HEENT:  NC/AT, sclera appear white  Neck:  No thyroid nodule palpable.   Neck:  No obvious thyromegaly  CV/Lungs:  Non distressed breathing  Skin:  No apparent rashes.   Neuro:  Normal mental status  Psych:  Normal affect    Results  Mixed Meal Test:  C Peptide   Date Value Ref Range Status   09/15/2022 1.6 0.9 - 6.9 ng/mL Final   09/15/2022 1.2 0.9 - 6.9 ng/mL Final   09/15/2022 0.3 (L) 0.9 - 6.9 ng/mL Final   10/07/2021 1.4 0.9 - 6.9 ng/mL Final   10/07/2021 1.2 0.9 - 6.9 ng/mL Final   08/20/2020 3.4 0.9 - 6.9 ng/mL Final   08/20/2020 1.5 0.9 - 6.9 ng/mL Final   08/20/2020 0.5 (L) 0.9 - 6.9 ng/mL Final   05/16/2019 1.8 0.9 - 6.9 ng/mL Final   05/16/2019 1.5 0.9 - 6.9 ng/mL Final     Glucose   Date Value Ref Range Status   01/05/2023 111 (H) 70 - 99 mg/dL Final   10/31/2022 97 70 - 99 mg/dL Final   09/23/2022 171 (H) 70 - 99 mg/dL Final   06/14/2022 104 (H) 70 - 99 mg/dL Final   04/16/2022 241 (H) 70 - 99 mg/dL Final   02/01/2022 90 70 - 99 mg/dL Final   01/18/2022 123 (H) 70 - 99 mg/dL Final   11/26/2021 139 (H) 70 - 99 mg/dL Final   05/10/2021 169 (H) 70 - 99 mg/dL Final   03/01/2021 141 (H) 70 - 99 mg/dL Final   02/28/2021 120 (H) 70 - 99 mg/dL Final   02/16/2021 106 (H) 70 - 99 mg/dL Final   10/30/2020 126 (H) 70 - 99 mg/dL Final     GLUCOSE BY METER POCT   Date Value Ref Range Status    09/23/2022 216 (H) 70 - 99 mg/dL Final   09/23/2022 157 (H) 70 - 99 mg/dL Final   09/23/2022 192 (H) 70 - 99 mg/dL Final   09/23/2022 71 70 - 99 mg/dL Final   09/22/2022 303 (H) 70 - 99 mg/dL Final       Hemoglobin A1c  Lab Results   Component Value Date    A1C 7.7 09/15/2022    A1C 8.2 05/08/2022    A1C 6.8 01/18/2022    A1C 6.5 10/07/2021    A1C 7.0 09/03/2021    A1C 6.8 05/10/2021    A1C 6.9 02/16/2021    A1C 6.7 08/20/2020    A1C 7.1 06/05/2020    A1C 6.9 02/06/2020       Liver enzymes  Lab Results   Component Value Date    AST 46 01/05/2023    AST 24 05/10/2021     Lab Results   Component Value Date    ALT 49 01/05/2023    ALT 35 05/10/2021     Lab Results   Component Value Date    BILICONJ 0.0 05/12/2014      Lab Results   Component Value Date    BILITOTAL 0.2 01/05/2023    BILITOTAL 0.3 05/10/2021     Lab Results   Component Value Date    ALBUMIN 4.0 01/05/2023    ALBUMIN 3.2 04/16/2022    ALBUMIN 3.4 05/10/2021     Lab Results   Component Value Date    PROTTOTAL 6.3 01/05/2023    PROTTOTAL 6.9 05/10/2021      Lab Results   Component Value Date    ALKPHOS 111 01/05/2023    ALKPHOS 132 05/10/2021       Creatinine:  Creatinine   Date Value Ref Range Status   01/05/2023 0.94 0.51 - 0.95 mg/dL Final   05/10/2021 0.81 0.52 - 1.04 mg/dL Final   ]    Complete Blood Count:  CBC RESULTS:   Recent Labs   Lab Test 01/05/23  1342   WBC 7.8   RBC 3.90   HGB 11.7   HCT 36.2   MCV 93   MCH 30.0   MCHC 32.3   RDW 14.7          Cholesterol  Lab Results   Component Value Date    CHOL 230 01/05/2023    CHOL 192 09/17/2020     Lab Results   Component Value Date    HDL 97 01/05/2023    HDL 91 09/17/2020     Lab Results   Component Value Date     01/05/2023    LDL 84 09/17/2020     Lab Results   Component Value Date    TRIG 89 01/05/2023    TRIG 83 09/17/2020     Lab Results   Component Value Date    CHOLHDLRATIO 1.9 11/20/2014       TSH   Date Value Ref Range Status   01/05/2023 6.56 (H) 0.30 - 4.20 uIU/mL Final    08/17/2021 2.75 0.40 - 4.00 mU/L Final   03/15/2021 2.93 0.40 - 4.00 mU/L Final     T4 Free   Date Value Ref Range Status   06/05/2020 1.05 0.76 - 1.46 ng/dL Final   01/22/2019 0.90 0.76 - 1.46 ng/dL Final   01/04/2018 0.72 (L) 0.76 - 1.46 ng/dL Final   03/06/2017 0.95 0.76 - 1.46 ng/dL Final   01/30/2017 0.78 0.76 - 1.46 ng/dL Final     Free T4   Date Value Ref Range Status   01/05/2023 0.71 (L) 0.90 - 1.70 ng/dL Final   08/17/2021 1.06 0.76 - 1.46 ng/dL Final           Assessment:  1.  Post pancreatectomy diabetes mellitus, s/p total pancreatectomy and islet autotransplant.    Lynn is a 57 year old with history of chronic pancreatitis who is s/p total pancreatectomy and islet autotransplant.  She was insulin independent until 6/6/2017 (just after 4 years post-tx) and now has partial islet function.    She is on Fiasp due to rapid gastric emptying and post meal hypoglycemia.      She has started on the Omnipod 5.  She clearly has improved TIR (spending 18% more time in  than previously).    She is having some hypoglycemia however, that we need to watch.  Her last dose adjustment on the pump was just two days ago.  Since that time, she has one episode of overnight low but otherwise looks OK.  Since it is such a short time interval since that dose change, I would recommend no other changes yet today.    She does have known hypothyroidism and appears to need more levothyroxine based on most recent labs -- since she already adjusted up to 150 mcg with her PCP we will keep that plan.  Recheck in about 1 month (6 weeks after labs).  Will need new Rx after next lab check once we decide whether keeping the 150 mcg dose is appropriate.  Did review her past history of thyroid nodule, no current nodule on exam so no ultrasound indicated at this time.       Plan:  1.  Changes to current diabetes regimen:  Patient Instructions   1)  No change to pump settings again.    2)  Let's keep the 150 mcg levothyroxine and  recheck TSH and FT4 in about 1 month from now.      May 18 at 2:20pm      2.  Frequency of blood sugar checks:  premeal and bedtime, and as needed for hypoglycemia (6 strip per day).    3.  Continue routine follow up for autoislet transplant patients:  Mixed meal test (6 mL/kg BoostHP to max of 360 mL) at 3 months, 6 months, and once a year post transplant.  Hemoglobin A1c levels at these time points and quarterly.    4.  Other issues addressed today:  As above    Follow up:  As above        Contact me for questions at [phone] or [phone].  Emergency number to reach pediatric endocrinology after hours is [phone].    Dr. Adriana Robles MD  [facility] [place]  Director of Research, Islet Auto-transplant Program  Phone:  [phone]  Electronically signed: January 19, 2023 at 2:35 PM      Review of the result(s) of each unique test - as indicated above  Ordering of each unique test  Prescription drug management  30 minutes spent on the date of the encounter doing chart review, history and exam, documentation and further activities per the note        Review of the result(s) of each unique test - A1c from recent hospital admit  Prescription drug management  40 minutes spent on the date of the encounter doing chart review, history and exam, documentation and further activities per the note

## 2023-01-19 NOTE — LETTER
1/19/2023         RE: Lynn Thompson  [address] [facility] [id number]        Dear Colleague,    Thank you for referring your patient, Lynn Thompson, to the [facility]. Please see a copy of my visit note below.    [facility]  Islet Autotransplant, Diabetes Follow Up    Problem List:  Patient Active Problem List   Diagnosis     Iron deficiency anemia secondary to inadequate dietary iron intake     Gastric bypass status for obesity     Health Care Home     Chronic pain syndrome     Exocrine pancreatic insufficiency     Asplenia     BLU (obstructive sleep apnea)     H/O of rectopexy     Dysthymia     Anxiety     Thyroid nodule     Acquired hypothyroidism     Post-pancreatectomy diabetes (H)     Other iron deficiency anemia     Uterovaginal prolapse, unspecified     Rectal prolapse     Small bowel obstruction (H)     Urge incontinence     Urinary urgency     High-tone pelvic floor dysfunction     Pyuria     Kidney stone on left side     History of uterine prolapse     Vaginal atrophy       HPI:  Lynn is a 57 year old female here for follow up o total pancreatectomy, islet cell autotransplant, splenectomy, liver biopsy (needle core), choledochoduodenostomy, feeding jejunostomy performed on 5/10/2013.  At the time of the procedure, the patient received 178,100 IEQ, or 3,209 IEQ/kg body weight. She has had two subsequent exploratory laparatomy for small bowel obstruction. Other notable endocrine history includes a complex cystic nodule in mid right thyroid lobe with 1 cm maximum diameter (saw Dr Adorno in 11/2016) and mild hypothyroidism followed by PCP, pathology in 2018 by FNA showed a benign nodule (includes adenomatoid nodule, colloid nodule, etc.).  She had an episode of cholangitis and was hospitalized for 4 days 8/24/17 (fevers, RUQ pain, + Ecoli blood cx).    She was on NO insulin at her 1 year, 2 year, 3 year, 4 year transplant  anniversaries and restart insulin just after 4 years post-tx, insulin restart date of  6/6/2017.   She had a slow opioid wean off suboxone but eventually did come off.     Other history notable for surgical procedure Admitted from 2/1- 2/5/22 for  1. Posterior suture rectopexy (Colorectal primary)  2. Sigmoidoscopy (Colorectal primary)  3. Supracervical hysterectomy with bilateral salpingooophrectomy (Uro)  4. Uterosacral ligament suspension (Uro)  5. Gan colposuspension (Uro)  6. Cystoscopy (Uro)        1)  Diabetes:  Lynn continues to have partial islet graft function.   She is on Fiasp right before meal time and has rapid gastric emptying.    Started on Omnipod 5 because of hyper and hypoglycemia, with extreme variability and A1c not at goal, despite optimization of MDI regimen.  Overall does seem to be doing better on this regimen.  She started this shortly after I saw her last a few months ago, late 2022.  She had one episode of not being able to find her controller and actually purchased a new one.  She has had some issues with early Dexcom G6 sensor failures and has talked to the company and has a plan for getting additional supply in case of pump failure.   Notices with better visibility of pump and sensor she is better able to adjust what she is eating for better control.   Today's A1c is pending    Pump, updated settings as of 1/17  Pump Settings: Changes in bold from 2 days ago     Max Basal 1.0 unit(s)/hr     Basal Rates:  12a 0.15 unit(s)/hr  8a 0.25 units/hr      Target Glucose  12a-8a 130 Correct above 140  8a-12 a 120 Correct above 130     Sensitivity   12a -8a 160  8am-12a 140     Insulin to Carb Ratio  12 am 28 grams     Max Bolus 6  Active insulin time 3 hours  Reverse correction off        Since changes made        Lab Results   Component Value Date    A1C 7.7 09/15/2022    A1C 8.2 05/08/2022    A1C 6.8 01/18/2022    A1C 6.5 10/07/2021    A1C 7.0 09/03/2021    A1C 6.8 05/10/2021    A1C 6.9  02/16/2021    A1C 6.7 08/20/2020    A1C 7.1 06/05/2020    A1C 6.9 02/06/2020       Hypoglycemia history:   Recent history as above.  The patient has had NO episodes of severe hypoglycemia (seizure, coma, or neuroglycopenic symptoms severe enough to require assistance from another person).  Blood sugars were reviewed from the patient records and/or the meter download.      Uses Dexcom G6-Better after pump start.   MORE TIME IN RANGE THAN PRE PUMP (was 55% of time at ).  Is having some hypoglycemia.  Still post meal hyperglycemia but it is more brief    Patient is good candidate for CGM therapy.  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   --  Require frequent adjustments of the insulin treatment regimen, based on therapeutic CGM test results      2)  Nutrition:  Weight loss has actually been stable, still thin but appears not to be actively losing     Last nutritional studies:  Component      Latest Ref Rng & Units 9/6/2022 9/15/2022   Iron      37 - 145 ug/dL 69    Iron Sat Index      15 - 46 % 27    Iron Binding Capacity      240 - 430 ug/dL 255    Vitamin A      0.30 - 1.20 mg/L  0.49   Retinol Palmitate      0.00 - 0.10 mg/L  <0.02   Vitamin A Interp        Normal   25 OH Vit D2      ug/L  <5   25 OH Vit D3      ug/L  58   25 OH Vit D total      20 - 75 ug/L  <63   Vitamin E      5.5 - 18.0 mg/L  10.3   Vitamin E Gamma      0.0 - 6.0 mg/L  0.9   Vitamin B12      232 - 1,245 pg/mL  418       3)  Thyroid:  Recent TFTs showed elevated TSH 6.5 with low FT4 0.71.  She has known acquired hypothyroidism of unclear  etiology, and has been on treatment for about 7 years.  Current dose of levothyroxine is 150 mcg.  However, she was on 100 mcg at time of lab draw and that change was made in response to the abnormal TFTs.  She has noticed drier skin and hair changes.  She has not noted any problems to suggest new malabsorption.  Stools are more formed than in the past, no steatorrhea.       Review of systems:  A complete Review Of Systems was performed but was negative except as noted in the HPI.    Past Medical and Surgical History:  Past Medical History:   Diagnosis Date     Benign paroxysmal positional vertigo     occ.      Calculus of kidney 05/2005    x1 on L side passed, several stones.  Has been tested for oxalate.     Chronic abdominal pain 2013     Chronic pancreatitis 2013     Depression     also occ panic spells     Diabetes (H) 5/10/2013    Total Pancreatomy with Auto Islets Transplant     Dyspepsia 1999    H. pylori   treated     Headaches     still periodic HA's ;  often 5X/week     Hypertension 2016    Stress related     Iron deficiency anemia 2003    relates to gastric bypass     Post-pancreatectomy diabetes melltius 2013     Sleep apnea     uses splint     Spasm of sphincter of Oddi     surgical + endoscopic stenting of pancreatic duct @ [facility] [place] 06     Thyroid nodule 2016     Past Surgical History:   Procedure Laterality Date     ABDOMINOPLASTY  2002    Tummy tuck     APPENDECTOMY       BUNIONECTOMY Right 1998     CBD Stent placement  2002    CBD stent; Dr. Presley      SECTION       CHOLECYSTECTOMY       COLONOSCOPY N/A 2021    Procedure: COLONOSCOPY INCOMPLETE Aborted due to incomplete prep  will need to take additional prep and return tomorrow 21;  Surgeon: Ihsan Saezn MD;  Location:  GI     COMBINED CYSTOSCOPY, RETROGRADES, URETEROSCOPY, LASER HOLMIUM LITHOTRIPSY URETER(S), INSERT STENT Right 2015    Procedure: COMBINED  CYSTOSCOPY, RETROGRADES, URETEROSCOPY, LASER HOLMIUM LITHOTRIPSY URETER(S), INSERT STENT;  Surgeon: Kennedi Aldana MD;  Location: [facility] OR     COMBINED CYSTOSCOPY, RETROGRADES, URETEROSCOPY, LASER HOLMIUM LITHOTRIPSY URETER(S), INSERT STENT Right 04/20/2015    Procedure: COMBINED CYSTOSCOPY, RETROGRADES, URETEROSCOPY, LASER HOLMIUM LITHOTRIPSY URETER(S), INSERT STENT;  Surgeon: Kennedi Aldana MD;  Location: [facility] OR     COSMETIC SURGERY  6/24/2002    Tummy tuck     CYSTECTOMY OVARIAN BENIGN Right      CYSTOSCOPY, RETROGRADES, INSERT STENT URETER(S), COMBINED  10/02/2012    Procedure: COMBINED CYSTOSCOPY, RETROGRADES, INSERT STENT URETER(S);  COMBINED CYSTOSCOPY,  , INSERT LEFT STENT URETER;  Surgeon: Johny Baez MD;  Location: [facility] OR     CYSTOSCOPY, RETROGRADES, INSERT STENT URETER(S), COMBINED Left 9/20/2022    Procedure: Cystoscopy with left retrograde pyelogram, left ureteroscopy, thulium laser lithotripsy of left ureteral calculus, stone basketing and left ureteral stent insertion;  Surgeon: Alonzo Rome MD;  Location: [facility] OR     ESOPHAGOSCOPY, GASTROSCOPY, DUODENOSCOPY (EGD), COMBINED N/A 01/24/2018    Procedure: COMBINED ESOPHAGOSCOPY, GASTROSCOPY, DUODENOSCOPY (EGD);  ESOPHAGOSCOPY, GASTROSCOPY, DUODENOSCOPY (EGD)    ;  Surgeon: Tamir Rodgers MD;  Location: [facility] GI     EXTRACORPOREAL SHOCK WAVE LITHOTRIPSY (ESWL)  10/16/2012    Procedure: EXTRACORPOREAL SHOCK WAVE LITHOTRIPSY (ESWL);  left EXTRACORPOREAL SHOCK WAVE LITHOTRIPSY (ESWL) ;  Surgeon: Johny Baez MD;  Location: [facility] OR     Gastric bypass NOS       HERNIA REPAIR  02/2015     HYSTERECTOMY SUPRACERVICAL, BILATERAL SALPINGO-OOPHORECTOMY, COMBINED N/A 02/01/2022    Procedure: Abdominal supracervical hysterectomy, bilateral salpingooophrectomy;  Surgeon: Nicole Rivera MD;  [facility]     IRRIGATION AND DEBRIDEMENT HAND, COMBINED Left 10/30/2020    Procedure: Left hand sharp excisional debridement of skin,  subcutaneous tissue and fat with a scalpel, 2 x 1 x 1 cm.;  Surgeon: Demian Renteria MD;  Location: [facility] OR     LAPAROSCOPIC LYSIS ADHESIONS N/A 2015    Procedure: LAPAROSCOPIC LYSIS ADHESIONS;  Surgeon: Aaron Early MD;  [facility]     LAPAROSCOPIC LYSIS ADHESIONS N/A 2015    Procedure: LAPAROSCOPIC LYSIS ADHESIONS;  Surgeon: Aaron Early MD;  [facility]     PANCREATECTOMY, TRANSPLANT AUTO ISLET CELL, COMBINED  05/10/2013    Procedure: COMBINED PANCREATECTOMY, TRANSPLANT AUTO ISLET CELL;  Pancreatectomy, Auto Islet Cell Transplant   hernia repair, jejunostomy tube and liver biopsies with Anesthesia General with block;  Surgeon: Aaron Early MD;  [facility]     Partial ileum resection  1992     RECTOPEXY ABDOMINAL N/A 2022    Procedure: RECTOPEXY, ABDOMINAL;  Surgeon: Uriah Sheridan MD;  [facility]     REPAIR PTOSIS BROW BILATERAL Bilateral 2020    Procedure: BILATERAL BROW PTOSIS REPAIR;  Surgeon: Denise Alberts MD;  Location: [facility] OR     SACROCOLPOPEXY, CYSTOSCOPY, COMBINED N/A 2022    Procedure: Uterosacral ligament suspension, pina colposuspension with Cystoscopy;  Surgeon: Nicole Rivera MD;  [facility]     SIGMOIDOSCOPY FLEXIBLE N/A 2022    Procedure: SIGMOIDOSCOPY, FLEXIBLE;  Surgeon: Uriah Sheridan MD;  [facility]     Surgery for SBO       TONSILLECTOMY, ADENOIDECTOMY, COMBINED  1997     TRANSPLANT  5/10/13    Pancreatic Auto-Islet Transplant       Family History:  New changes since last visit:  none  Family History   Problem Relation Age of Onset     Family History Negative Mother      Respiratory Father         COPD;  at 69     Genitourinary Problems Father         kidney stones     Substance Abuse Father      Depression Father      Asthma Father      Heart Disease Paternal Grandfather         M.I.     Coronary Artery Disease Paternal Grandfather      Hyperlipidemia Paternal  Grandfather      Genitourinary Problems Brother         multiple brothers with kidney stones     Gastrointestinal Disease Maternal Grandmother         undiagnosed 'gut' issues     Coronary Artery Disease Maternal Grandfather      Hyperlipidemia Maternal Grandfather      Cerebrovascular Disease Paternal Grandmother         At the age of 103     Anxiety Disorder Paternal Grandmother      Osteoporosis Paternal Grandmother      Anxiety Disorder Son      Anxiety Disorder Daughter      Asthma Daughter      Colon Cancer No family hx of        Social History:  Social History     Social History Narrative     Not on file     Does not work currently.  Mom to many foster dogs     Physical Exam:  Vitals: BP (!) 179/94   Pulse 68   Wt 56.7 kg (124 lb 14.4 oz)   LMP 12/19/2013   SpO2 98%   BMI 21.44 kg/m    BMI= Body mass index is 21.44 kg/m .  General:  Appearance is normal, no acute distress  HEENT:  NC/AT, sclera appear white  Neck:  No thyroid nodule palpable.   Neck:  No obvious thyromegaly  CV/Lungs:  Non distressed breathing  Skin:  No apparent rashes.   Neuro:  Normal mental status  Psych:  Normal affect    Results  Mixed Meal Test:  C Peptide   Date Value Ref Range Status   09/15/2022 1.6 0.9 - 6.9 ng/mL Final   09/15/2022 1.2 0.9 - 6.9 ng/mL Final   09/15/2022 0.3 (L) 0.9 - 6.9 ng/mL Final   10/07/2021 1.4 0.9 - 6.9 ng/mL Final   10/07/2021 1.2 0.9 - 6.9 ng/mL Final   08/20/2020 3.4 0.9 - 6.9 ng/mL Final   08/20/2020 1.5 0.9 - 6.9 ng/mL Final   08/20/2020 0.5 (L) 0.9 - 6.9 ng/mL Final   05/16/2019 1.8 0.9 - 6.9 ng/mL Final   05/16/2019 1.5 0.9 - 6.9 ng/mL Final     Glucose   Date Value Ref Range Status   01/05/2023 111 (H) 70 - 99 mg/dL Final   10/31/2022 97 70 - 99 mg/dL Final   09/23/2022 171 (H) 70 - 99 mg/dL Final   06/14/2022 104 (H) 70 - 99 mg/dL Final   04/16/2022 241 (H) 70 - 99 mg/dL Final   02/01/2022 90 70 - 99 mg/dL Final   01/18/2022 123 (H) 70 - 99 mg/dL Final   11/26/2021 139 (H) 70 - 99 mg/dL Final    05/10/2021 169 (H) 70 - 99 mg/dL Final   03/01/2021 141 (H) 70 - 99 mg/dL Final   02/28/2021 120 (H) 70 - 99 mg/dL Final   02/16/2021 106 (H) 70 - 99 mg/dL Final   10/30/2020 126 (H) 70 - 99 mg/dL Final     GLUCOSE BY METER POCT   Date Value Ref Range Status   09/23/2022 216 (H) 70 - 99 mg/dL Final   09/23/2022 157 (H) 70 - 99 mg/dL Final   09/23/2022 192 (H) 70 - 99 mg/dL Final   09/23/2022 71 70 - 99 mg/dL Final   09/22/2022 303 (H) 70 - 99 mg/dL Final       Hemoglobin A1c  Lab Results   Component Value Date    A1C 7.7 09/15/2022    A1C 8.2 05/08/2022    A1C 6.8 01/18/2022    A1C 6.5 10/07/2021    A1C 7.0 09/03/2021    A1C 6.8 05/10/2021    A1C 6.9 02/16/2021    A1C 6.7 08/20/2020    A1C 7.1 06/05/2020    A1C 6.9 02/06/2020       Liver enzymes  Lab Results   Component Value Date    AST 46 01/05/2023    AST 24 05/10/2021     Lab Results   Component Value Date    ALT 49 01/05/2023    ALT 35 05/10/2021     Lab Results   Component Value Date    BILICONJ 0.0 05/12/2014      Lab Results   Component Value Date    BILITOTAL 0.2 01/05/2023    BILITOTAL 0.3 05/10/2021     Lab Results   Component Value Date    ALBUMIN 4.0 01/05/2023    ALBUMIN 3.2 04/16/2022    ALBUMIN 3.4 05/10/2021     Lab Results   Component Value Date    PROTTOTAL 6.3 01/05/2023    PROTTOTAL 6.9 05/10/2021      Lab Results   Component Value Date    ALKPHOS 111 01/05/2023    ALKPHOS 132 05/10/2021       Creatinine:  Creatinine   Date Value Ref Range Status   01/05/2023 0.94 0.51 - 0.95 mg/dL Final   05/10/2021 0.81 0.52 - 1.04 mg/dL Final   ]    Complete Blood Count:  CBC RESULTS:   Recent Labs   Lab Test 01/05/23  1342   WBC 7.8   RBC 3.90   HGB 11.7   HCT 36.2   MCV 93   MCH 30.0   MCHC 32.3   RDW 14.7          Cholesterol  Lab Results   Component Value Date    CHOL 230 01/05/2023    CHOL 192 09/17/2020     Lab Results   Component Value Date    HDL 97 01/05/2023    HDL 91 09/17/2020     Lab Results   Component Value Date     01/05/2023     LDL 84 09/17/2020     Lab Results   Component Value Date    TRIG 89 01/05/2023    TRIG 83 09/17/2020     Lab Results   Component Value Date    CHOLHDLRATIO 1.9 11/20/2014       TSH   Date Value Ref Range Status   01/05/2023 6.56 (H) 0.30 - 4.20 uIU/mL Final   08/17/2021 2.75 0.40 - 4.00 mU/L Final   03/15/2021 2.93 0.40 - 4.00 mU/L Final     T4 Free   Date Value Ref Range Status   06/05/2020 1.05 0.76 - 1.46 ng/dL Final   01/22/2019 0.90 0.76 - 1.46 ng/dL Final   01/04/2018 0.72 (L) 0.76 - 1.46 ng/dL Final   03/06/2017 0.95 0.76 - 1.46 ng/dL Final   01/30/2017 0.78 0.76 - 1.46 ng/dL Final     Free T4   Date Value Ref Range Status   01/05/2023 0.71 (L) 0.90 - 1.70 ng/dL Final   08/17/2021 1.06 0.76 - 1.46 ng/dL Final           Assessment:  1.  Post pancreatectomy diabetes mellitus, s/p total pancreatectomy and islet autotransplant.    Lynn is a 57 year old with history of chronic pancreatitis who is s/p total pancreatectomy and islet autotransplant.  She was insulin independent until 6/6/2017 (just after 4 years post-tx) and now has partial islet function.    She is on Fiasp due to rapid gastric emptying and post meal hypoglycemia.      She has started on the Omnipod 5.  She clearly has improved TIR (spending 18% more time in  than previously).    She is having some hypoglycemia however, that we need to watch.  Her last dose adjustment on the pump was just two days ago.  Since that time, she has one episode of overnight low but otherwise looks OK.  Since it is such a short time interval since that dose change, I would recommend no other changes yet today.    She does have known hypothyroidism and appears to need more levothyroxine based on most recent labs -- since she already adjusted up to 150 mcg with her PCP we will keep that plan.  Recheck in about 1 month (6 weeks after labs).  Will need new Rx after next lab check once we decide whether keeping the 150 mcg dose is appropriate.  Did review her past  history of thyroid nodule, no current nodule on exam so no ultrasound indicated at this time.       Plan:  1.  Changes to current diabetes regimen:  Patient Instructions   1)  No change to pump settings again.    2)  Let's keep the 150 mcg levothyroxine and recheck TSH and FT4 in about 1 month from now.      May 18 at 2:20pm      2.  Frequency of blood sugar checks:  premeal and bedtime, and as needed for hypoglycemia (6 strip per day).    3.  Continue routine follow up for autoislet transplant patients:  Mixed meal test (6 mL/kg BoostHP to max of 360 mL) at 3 months, 6 months, and once a year post transplant.  Hemoglobin A1c levels at these time points and quarterly.    4.  Other issues addressed today:  As above    Follow up:  As above        Contact me for questions at [phone] or [phone].  Emergency number to reach pediatric endocrinology after hours is [phone].    Dr. Adriana Robles MD  [facility] [place]  Director of Research, Islet Auto-transplant Program  Phone:  [phone]  Electronically signed: January 19, 2023 at 2:35 PM

## 2023-01-19 NOTE — PATIENT INSTRUCTIONS
1)  No change to pump settings again.    2)  Let's keep the 150 mcg levothyroxine and recheck TSH and FT4 in about 1 month from now.      May 18 at 2:20pm

## 2023-01-27 ENCOUNTER — TELEPHONE (OUTPATIENT)
Dept: ONCOLOGY | Facility: CLINIC | Age: 58
End: 2023-01-27
Payer: COMMERCIAL

## 2023-01-27 DIAGNOSIS — D50.8 OTHER IRON DEFICIENCY ANEMIA: Primary | ICD-10-CM

## 2023-01-27 NOTE — TELEPHONE ENCOUNTER
Reviewed Lynn's  Iron labs from 1/23 with Dr. Montero. No concerns/interventions with Lynn's Iron labs per Dr. Perla. Lynn is to have repeat labs and follow up with [facility] in 4/23. Called Lynn and she is aware of Dr. Montero's recommendation and will have labs done at her primary [facility] with follow up 4/11/23 with [facility] Haydee Krishnamurthy. Payal Aiken RN,BSN,OCN,CBCN

## 2023-02-25 ENCOUNTER — ANCILLARY PROCEDURE (OUTPATIENT)
Dept: MAMMOGRAPHY | Facility: CLINIC | Age: 58
End: 2023-02-25
Attending: FAMILY MEDICINE
Payer: COMMERCIAL

## 2023-02-25 DIAGNOSIS — Z12.31 ENCOUNTER FOR SCREENING MAMMOGRAM FOR BREAST CANCER: ICD-10-CM

## 2023-02-25 PROCEDURE — 77067 SCR MAMMO BI INCL CAD: CPT | Mod: TC | Performed by: RADIOLOGY

## 2023-02-25 PROCEDURE — 77063 BREAST TOMOSYNTHESIS BI: CPT | Mod: TC | Performed by: RADIOLOGY

## 2023-03-03 ENCOUNTER — OFFICE VISIT (OUTPATIENT)
Dept: URGENT CARE | Facility: URGENT CARE | Age: 58
End: 2023-03-03
Payer: COMMERCIAL

## 2023-03-03 VITALS
RESPIRATION RATE: 16 BRPM | OXYGEN SATURATION: 96 % | DIASTOLIC BLOOD PRESSURE: 91 MMHG | HEART RATE: 67 BPM | TEMPERATURE: 98.4 F | SYSTOLIC BLOOD PRESSURE: 170 MMHG

## 2023-03-03 DIAGNOSIS — G89.4 CHRONIC PAIN SYNDROME: ICD-10-CM

## 2023-03-03 DIAGNOSIS — M77.8 TENDONITIS OF SHOULDER, RIGHT: Primary | ICD-10-CM

## 2023-03-03 PROCEDURE — 99214 OFFICE O/P EST MOD 30 MIN: CPT | Performed by: INTERNAL MEDICINE

## 2023-03-03 RX ORDER — HYDROMORPHONE HYDROCHLORIDE 2 MG/1
2 TABLET ORAL EVERY 6 HOURS PRN
Qty: 16 TABLET | Refills: 0 | Status: SHIPPED | OUTPATIENT
Start: 2023-03-03 | End: 2023-03-07

## 2023-03-03 NOTE — PATIENT INSTRUCTIONS
While the pattern of pain affecting the entire arm and the associated numbness makes us thing about your neck and a pinched nerve, the physical exam is pointing more toward the shoulder.      We will start with ramping up gabapentin to 900 mg at bedtime.  Short-term use of Dilaudid for just the next several days.  Add in topical diclofenac.    Follow-up with Dr. Brooks in the coming week.  Depending upon your response to these treatments, you may benefit from further assessment on the shoulder and consider whether you'd benefit from a steroid injection.

## 2023-03-03 NOTE — PROGRESS NOTES
Assessment & Plan     Tendonitis of shoulder, right  Severe pain with some associated numbness involving the entire right upper extremity initially led to concern for a cervical radiculopathy but the physical exam features are pointing much more to the right shoulder and the possibility of an acute inflammatory tendonitis.  The subjective pain experience is complicated by the patient's chronic pain syndrome and factors influencing central nociception.  We discussed various acute pain management strategies with our primary tool right now being an increase in gabapentin as well as topical NSAID.  Will do short-term acute opioid therapy.  The patient is very well versed on the risks and benefits of opioids given her prior history and understands that opioid therapy will only be a short-term approach while the gabapentin is ramping up.  Depending upon the evolution of this problem, she may benefit from further orthopedic assessment to verify the glenohumeral joint as the source of her pain and consider the role of intra-articular corticosteroids or other interventional pain management approaches.    - HYDROmorphone (DILAUDID) 2 MG tablet; Take 1 tablet (2 mg) by mouth every 6 hours as needed for pain  - diclofenac (VOLTAREN) 1 % topical gel; Apply 2 g topically 4 times daily    Chronic pain syndrome  As above.  - HYDROmorphone (DILAUDID) 2 MG tablet; Take 1 tablet (2 mg) by mouth every 6 hours as needed for pain  - diclofenac (VOLTAREN) 1 % topical gel; Apply 2 g topically 4 times daily    Reddy Ley MD  [facility] [place]    Tyesha Bailey is a 58 year old, presenting for the following health issues:  Urgent Care and Musculoskeletal Problem (Right arm-Starting at shoulder radiating to hand-Numbness, swollen hand and throbbing pain-No known injury-Started 2 days ago )      HPI   Chief complaint of pain in the right upper extremity.  This had initially been in the forearm and  extending out through the hand.  This has been problematic for the past several months on/off.  Now has flared up intensely with the pain extending up to the upper arm and under the shoulder blade on the right side.  The arm has felt this way for months.    Complex past medical history including:  Has a history of Crohn's disease and chronic pancreatitis.  She is s/p pancreatectomy and splenectomy.   She has subsequent insulin--requiring diabetes mellitus as well as exocrine pancreatic insufficiency and renal lithiasis.  She has a chronic pain syndrome with a prior history of chronic opioid therapy and physiologic opioid dependence.  She weaned off opioid therapy using suboxone several years ago.  While she was on chronic opioid therapy she complied well with prescribed treatments.      Review of Systems   ROS:  The following systems have been completely reviewed and are negative except as noted in the HPI: CONSTITUTIONAL, MUSCULOSKELETAL, NEUROLOGIC and PSYCHIATRIC       Objective    BP (!) 170/91   Pulse 67   Temp 98.4  F (36.9  C) (Oral)   Resp 16   LMP 12/19/2013   SpO2 96%   There is no height or weight on file to calculate BMI.  Physical Exam   GENERAL APPEARANCE: alert, cooperative and conversant; holding the right arm very still; moving the head and neck freely  MS: exquisite tenderness to palpation across the posterior shoulder and glenoid and extending down the upper arm; reports a stabbing pain radiating down the right arm with passive internal or external rotation of the right shoulder; tolerates cervical extension/flexion/rotation/lateral bending without pain and FROM  NEURO: strength is 5/5 in the right upper extremity with , forearm supination / pronation, elbow flexion; any subjective weakness appears to be antalgic

## 2023-03-05 ENCOUNTER — HOSPITAL ENCOUNTER (EMERGENCY)
Facility: CLINIC | Age: 58
Discharge: HOME | End: 2023-03-05
Attending: EMERGENCY MEDICINE | Admitting: EMERGENCY MEDICINE
Payer: COMMERCIAL

## 2023-03-05 ENCOUNTER — APPOINTMENT (OUTPATIENT)
Dept: ULTRASOUND IMAGING | Facility: CLINIC | Age: 58
End: 2023-03-05
Attending: EMERGENCY MEDICINE
Payer: COMMERCIAL

## 2023-03-05 ENCOUNTER — APPOINTMENT (OUTPATIENT)
Dept: GENERAL RADIOLOGY | Facility: CLINIC | Age: 58
End: 2023-03-05
Attending: EMERGENCY MEDICINE
Payer: COMMERCIAL

## 2023-03-05 VITALS
HEART RATE: 88 BPM | OXYGEN SATURATION: 100 % | RESPIRATION RATE: 22 BRPM | SYSTOLIC BLOOD PRESSURE: 119 MMHG | DIASTOLIC BLOOD PRESSURE: 92 MMHG | TEMPERATURE: 98.3 F

## 2023-03-05 DIAGNOSIS — M25.511 ACUTE PAIN OF RIGHT SHOULDER: ICD-10-CM

## 2023-03-05 PROCEDURE — 250N000011 HC RX IP 250 OP 636: Performed by: EMERGENCY MEDICINE

## 2023-03-05 PROCEDURE — 93971 EXTREMITY STUDY: CPT | Mod: RT

## 2023-03-05 PROCEDURE — 250N000013 HC RX MED GY IP 250 OP 250 PS 637: Performed by: EMERGENCY MEDICINE

## 2023-03-05 PROCEDURE — 99285 EMERGENCY DEPT VISIT HI MDM: CPT | Mod: 25

## 2023-03-05 PROCEDURE — 96372 THER/PROPH/DIAG INJ SC/IM: CPT | Performed by: EMERGENCY MEDICINE

## 2023-03-05 PROCEDURE — 73030 X-RAY EXAM OF SHOULDER: CPT | Mod: RT

## 2023-03-05 RX ORDER — LIDOCAINE 50 MG/G
1 PATCH TOPICAL EVERY 24 HOURS
Qty: 10 PATCH | Refills: 0 | Status: SHIPPED | OUTPATIENT
Start: 2023-03-05 | End: 2023-03-15

## 2023-03-05 RX ORDER — ACETAMINOPHEN 325 MG/1
975 TABLET ORAL ONCE
Status: COMPLETED | OUTPATIENT
Start: 2023-03-05 | End: 2023-03-05

## 2023-03-05 RX ORDER — KETOROLAC TROMETHAMINE 15 MG/ML
15 INJECTION, SOLUTION INTRAMUSCULAR; INTRAVENOUS ONCE
Status: COMPLETED | OUTPATIENT
Start: 2023-03-05 | End: 2023-03-05

## 2023-03-05 RX ORDER — CYCLOBENZAPRINE HCL 10 MG
10 TABLET ORAL ONCE
Status: COMPLETED | OUTPATIENT
Start: 2023-03-05 | End: 2023-03-05

## 2023-03-05 RX ORDER — CYCLOBENZAPRINE HCL 10 MG
10 TABLET ORAL 3 TIMES DAILY PRN
Qty: 20 TABLET | Refills: 0 | Status: SHIPPED | OUTPATIENT
Start: 2023-03-05 | End: 2023-03-11

## 2023-03-05 RX ORDER — LIDOCAINE 4 G/G
1 PATCH TOPICAL ONCE
Status: DISCONTINUED | OUTPATIENT
Start: 2023-03-05 | End: 2023-03-06 | Stop reason: HOSPADM

## 2023-03-05 RX ADMIN — ACETAMINOPHEN 975 MG: 325 TABLET, FILM COATED ORAL at 20:47

## 2023-03-05 RX ADMIN — KETOROLAC TROMETHAMINE 15 MG: 15 INJECTION, SOLUTION INTRAMUSCULAR; INTRAVENOUS at 20:47

## 2023-03-05 RX ADMIN — CYCLOBENZAPRINE HYDROCHLORIDE 10 MG: 10 TABLET, FILM COATED ORAL at 20:47

## 2023-03-05 RX ADMIN — LIDOCAINE 1 PATCH: 560 PATCH PERCUTANEOUS; TOPICAL; TRANSDERMAL at 22:53

## 2023-03-05 ASSESSMENT — ACTIVITIES OF DAILY LIVING (ADL)
ADLS_ACUITY_SCORE: 37
ADLS_ACUITY_SCORE: 37
ADLS_ACUITY_SCORE: 35

## 2023-03-05 NOTE — ED TRIAGE NOTES
"Right arm stabbing intermittent pain since Wednesday.  Pain \"shoots\" into elbow and upper arm.  Seen at  Friday sent home with pain meds.  Reports no relief from pain meds.      Triage Assessment     Row Name 03/05/23 1547       Triage Assessment (Adult)    Airway WDL WDL       Respiratory WDL    Respiratory WDL WDL       Skin Circulation/Temperature WDL    Skin Circulation/Temperature WDL WDL       Cardiac WDL    Cardiac WDL WDL       Peripheral/Neurovascular WDL    Peripheral Neurovascular WDL all    Capillary Refill, RUE less than/equal to 3 secs       RUE Neurovascular Assessment    Sensation RUE numbness present;tingling present       Cognitive/Neuro/Behavioral WDL    Cognitive/Neuro/Behavioral WDL WDL              "

## 2023-03-06 ENCOUNTER — TRANSFERRED RECORDS (OUTPATIENT)
Dept: HEALTH INFORMATION MANAGEMENT | Facility: CLINIC | Age: 58
End: 2023-03-06

## 2023-03-06 NOTE — DISCHARGE INSTRUCTIONS
May use flexeril for spasm and muscle relaxation.  Avoid driving, operating heavy machinery or other dangerous activity.  Please use tylenol and ibuprofen for pain control.  Heat and warm packs to the areas.  Return if worsening pain, numbness or tingling, chest pain or shortness of breath, weakness or other acute changes.      Follow up with ortho as soon as able

## 2023-03-06 NOTE — ED PROVIDER NOTES
History     Chief Complaint:  Arm Pain       HPI   Lynn Thompson is a 58 year old female who presents with right arm pain that shoots down her arm and sometimes into her neck. This started 4 days ago and has been worsening. Twisting her arm worsens the pain but bending it does not cause any pain. She has also had some numbness in her arm. No trauma. She has been taking Advil and applying a gel for inflammation. She presented to  earlier today and was given Dilaudid at 2:30pm. No medication is giving her relief. She has a history of chronic pain and she takes 300 mg gabapentin at night. She denies chest pain shortness of breath, or fever. She denies history of blood clots, She denies IV drug use. 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     Independent Historian: the patient an significant other    Review of External Notes: UC 3/3/23 for tendonitis of right shoulder; opioid therapy     ROS:  Review of Systems   All other systems reviewed and are negative.      Allergies:  Corticosteroids  Povidone Iodine  Nsaids  Sulfasalazine     Medications:    Cymbalta  Lexapro  Estrace  Pepcid  Neurontin  Dilaudid  Atarax  Synthroid  Viokace  Claritin  Provigil  Prilosec  Desyrel     Past Medical History:    Benign paroxysmal positional vertigo  Chronic pancreatitis  Depression  Diabetes mellitus   Hypertension  Dyspepsia  Anemia  Sleep apnea  Spasm of sphincter of Oddi  Thyroid nodule    Past Surgical History:    Abdominoplasty  Appendectomy  Bunionectomy  C section  Colonoscopy  Cholecystectomy  Cystoscopy  Ureteroscopy  EGD  Tonsillectomy   Sigmoidoscopy  Pancreatic Auto-Islet Transplant    Family History:    family history includes Anxiety Disorder in her daughter, paternal grandmother, and son; Asthma in her daughter and father; Cerebrovascular Disease in her paternal grandmother; Coronary Artery Disease in her maternal grandfather and paternal grandfather; Depression in her father; Family  History Negative in her mother; Gastrointestinal Disease in her maternal grandmother; Genitourinary Problems in her brother and father; Heart Disease in her paternal grandfather; Hyperlipidemia in her maternal grandfather and paternal grandfather; Osteoporosis in her paternal grandmother; Respiratory in her father; Substance Abuse in her father.    Social History:   reports that she has never smoked. She has never used smokeless tobacco. She reports that she does not currently use alcohol. She reports that she does not currently use drugs.  PCP: Maryana Brooks     Physical Exam     Patient Vitals for the past 24 hrs:   BP Temp Temp src Pulse Resp SpO2   03/05/23 2100 -- 98.3  F (36.8  C) Oral -- -- --   03/05/23 1552 (!) 119/92 -- -- 88 22 100 %        Physical Exam  General: Resting on the bed.  Appears uncomfortable, sobbing   Head: No obvious trauma to head.  Ears, Nose, Throat:  External ears normal.  Nose normal.    Eyes:  Conjunctivae clear.  Pupils are equal, round, and reactive.   Neck: Normal range of motion.  Neck supple. Normal range of motion of the neck.  CV: Regular rate and rhythm.  No murmurs.      Respiratory: Effort normal and breath sounds normal.  No wheezing or crackles.   Gastrointestinal: Soft.  No distension. There is no tenderness.    Musculoskeletal: Right shoulder with discomfort on range of motion with internal and external rotation.  Patient is able to abduct and adduct arm without issue.  2+ radial pulses.   5 out of 5.  2+ brachial reflexes.  Sensation intact to light touch.  Mild tenderness to palpation of the right trapezius muscle as well.  Neuro: Alert. Moving all extremities appropriately.  Normal speech.    Skin: Skin is warm and dry.  No rash noted.       Emergency Department Course     Imaging:  XR Shoulder Right 3 Views   Final Result   IMPRESSION: Normal joint spaces and alignment. No fracture.      US Upper Extremity Venous Duplex Right   Final Result   IMPRESSION:    1.  No deep venous thrombosis in the right upper extremity.         Report per radiology    Laboratory:  Labs Ordered and Resulted from Time of ED Arrival to Time of ED Departure - No data to display     Emergency Department Course & Assessments:             Interventions:  Medications   ketorolac (TORADOL) injection 15 mg (15 mg Intramuscular $Given 3/5/23 2047)   cyclobenzaprine (FLEXERIL) tablet 10 mg (10 mg Oral $Given 3/5/23 2047)   acetaminophen (TYLENOL) tablet 975 mg (975 mg Oral $Given 3/5/23 2047)        Assessments:   I obtained a history and examined the patient    I rechecked the patient    Independent Interpretation (X-rays, CTs, rhythm strip):  I reviewed x-ray and do not see any appreciable fracture, dislocation or effusion.    Consultations/Discussion of Management or Tests:  None        Social Determinants of Health affecting care:   None    Disposition:  The patient was discharged to home.     Impression & Plan      Medical Decision Makin-year-old female presents to the ER with shoulder pain.  Vitals are reassuring.  Broad differential was pursued including but not limited to fracture, dislocation, DVT, neurovascular injury, tendinitis, septic joint, arthritis, sprain strain, etc.  Overall patient is well-appearing nontoxic.  X-ray shows no acute fracture or dislocation.  She has no erythema, warmth or induration to the joint.  I do not suspect septic joint.  She has relatively preserved range of motion but tenderness with internal/external rotation of the shoulder.  This seems more consistent with a possible tendinitis.  Ultrasound was negative for DVT.  She is neurovascular intact distal to the pain.  Patient does have chronic pain.  She was on oral Dilaudid and not receiving relief.  She also does have some tenderness to the right trapezius and therefore we opted to try a muscle relaxer.  We will try Lidoderm patch as well.  She is given Toradol and Tylenol.  At this point I do  not think this is referred pain and does not appear to be radicular in nature.  She has no chest pain or shortness of breath indicate ACS or other pathology.  This seems to be isolated to range of motion of the shoulder as well as some sharp stabbing pain.  I have recommended close follow-up with orthopedics for presumed tendinitis.  Discussed this with patient and  who are agreeable.  She was discharged home in improved condition.      Diagnosis:    ICD-10-CM    1. Acute pain of right shoulder  M25.511            Discharge Medications:  New Prescriptions    No medications on file          Scribe Disclosure:  Terra SALDANA, am serving as a scribe at 7:44 PM on 3/5/2023 to document services personally performed by Maryana Del Rio MD based on my observations and the provider's statements to me.   3/5/2023   Maryana Del Rio MD Bennett, Jennifer L, MD  03/05/23 5411

## 2023-03-16 ENCOUNTER — TELEPHONE (OUTPATIENT)
Dept: ENDOCRINOLOGY | Facility: CLINIC | Age: 58
End: 2023-03-16

## 2023-03-16 NOTE — TELEPHONE ENCOUNTER
Please route determinations to the Pharm Diabetes pool ([id number]).      Thank you!    Diabetes Care Services Team   [facility]  [address] [place] [address]

## 2023-03-16 NOTE — TELEPHONE ENCOUNTER
Please route determinations to the Pharm Diabetes pool ([id number]).      Thank you!    Diabetes Care Services Team   [facility]  [address] [place] [address]

## 2023-03-21 NOTE — TELEPHONE ENCOUNTER
Prior Authorization Approval    Authorization Effective Date: 3/21/2023  Authorization Expiration Date: 3/21/2025  Medication: Continuous Blood Gluc Sensor (DEXCOM G6 SENSOR) MISC--APPROVED  Approved Dose/Quantity:   Reference #:     Insurance Company: Other (see comments)  Expected CoPay:       CoPay Card Available:      Foundation Assistance Needed:    Which Pharmacy is filling the prescription (Not needed for infusion/clinic administered): [facility] - [place], [place] - [address]  Pharmacy Notified: Yes  Patient Notified: Yes **Instructed pharmacy to notify patient when script is ready to /ship.**

## 2023-03-21 NOTE — TELEPHONE ENCOUNTER
PA Initiation    Medication: Continuous Blood Gluc Sensor (DEXCOM G6 SENSOR) MISC   Insurance Company: Other (see comments)  Pharmacy Filling the Rx: [facility] - [place], [place] - [facility] [address]  Filling Pharmacy Phone: [phone]  Filling Pharmacy Fax: [phone]  Start Date: 3/21/2023

## 2023-03-21 NOTE — TELEPHONE ENCOUNTER
PA Initiation    Medication: Continuous Blood Gluc Transmit (DEXCOM G6 TRANSMITTER) MISC   Insurance Company: Other (see comments)  Pharmacy Filling the Rx: [facility] - [place], [place] - [facility] [address]  Filling Pharmacy Phone: [phone]  Filling Pharmacy Fax: [phone]  Start Date: 3/21/2023

## 2023-03-22 NOTE — TELEPHONE ENCOUNTER
Prior Authorization Approval    Authorization Effective Date: 3/21/2023  Authorization Expiration Date: 3/21/2025  Medication: Continuous Blood Gluc Transmit (DEXCOM G6 TRANSMITTER) MISC--APPROVED  Approved Dose/Quantity:   Reference #:     Insurance Company: Other (see comments)  Expected CoPay:       CoPay Card Available:      Foundation Assistance Needed:    Which Pharmacy is filling the prescription (Not needed for infusion/clinic administered): [facility] - [place], [place] - [address]  Pharmacy Notified: Yes  Patient Notified: Yes **Instructed pharmacy to notify patient when script is ready to /ship.**

## 2023-03-24 ENCOUNTER — LAB (OUTPATIENT)
Dept: LAB | Facility: CLINIC | Age: 58
End: 2023-03-24
Payer: COMMERCIAL

## 2023-03-24 DIAGNOSIS — D50.8 OTHER IRON DEFICIENCY ANEMIA: ICD-10-CM

## 2023-03-24 DIAGNOSIS — E03.9 ACQUIRED HYPOTHYROIDISM: ICD-10-CM

## 2023-03-24 LAB
BASOPHILS # BLD AUTO: 0.1 10E3/UL (ref 0–0.2)
BASOPHILS NFR BLD AUTO: 1 %
EOSINOPHIL # BLD AUTO: 0.2 10E3/UL (ref 0–0.7)
EOSINOPHIL NFR BLD AUTO: 2 %
ERYTHROCYTE [DISTWIDTH] IN BLOOD BY AUTOMATED COUNT: 15 % (ref 10–15)
HCT VFR BLD AUTO: 38.7 % (ref 35–47)
HGB BLD-MCNC: 12.4 G/DL (ref 11.7–15.7)
LYMPHOCYTES # BLD AUTO: 3 10E3/UL (ref 0.8–5.3)
LYMPHOCYTES NFR BLD AUTO: 32 %
MCH RBC QN AUTO: 29.4 PG (ref 26.5–33)
MCHC RBC AUTO-ENTMCNC: 32 G/DL (ref 31.5–36.5)
MCV RBC AUTO: 92 FL (ref 78–100)
MONOCYTES # BLD AUTO: 0.8 10E3/UL (ref 0–1.3)
MONOCYTES NFR BLD AUTO: 9 %
NEUTROPHILS # BLD AUTO: 5.3 10E3/UL (ref 1.6–8.3)
NEUTROPHILS NFR BLD AUTO: 56 %
PLATELET # BLD AUTO: 526 10E3/UL (ref 150–450)
RBC # BLD AUTO: 4.22 10E6/UL (ref 3.8–5.2)
WBC # BLD AUTO: 9.4 10E3/UL (ref 4–11)

## 2023-03-24 PROCEDURE — 83540 ASSAY OF IRON: CPT

## 2023-03-24 PROCEDURE — 85025 COMPLETE CBC W/AUTO DIFF WBC: CPT

## 2023-03-24 PROCEDURE — 83550 IRON BINDING TEST: CPT

## 2023-03-24 PROCEDURE — 36415 COLL VENOUS BLD VENIPUNCTURE: CPT

## 2023-03-24 PROCEDURE — 84439 ASSAY OF FREE THYROXINE: CPT

## 2023-03-24 PROCEDURE — 82728 ASSAY OF FERRITIN: CPT

## 2023-03-24 PROCEDURE — 84443 ASSAY THYROID STIM HORMONE: CPT

## 2023-03-25 LAB
FERRITIN SERPL-MCNC: 44 NG/ML (ref 11–328)
IRON BINDING CAPACITY (ROCHE): 310 UG/DL (ref 240–430)
IRON SATN MFR SERPL: 38 % (ref 15–46)
IRON SERPL-MCNC: 118 UG/DL (ref 37–145)
T4 FREE SERPL-MCNC: 1.26 NG/DL (ref 0.9–1.7)
TSH SERPL DL<=0.005 MIU/L-ACNC: 0.5 UIU/ML (ref 0.3–4.2)

## 2023-03-27 ENCOUNTER — TRANSFERRED RECORDS (OUTPATIENT)
Dept: HEALTH INFORMATION MANAGEMENT | Facility: CLINIC | Age: 58
End: 2023-03-27

## 2023-03-27 DIAGNOSIS — E03.9 ACQUIRED HYPOTHYROIDISM: ICD-10-CM

## 2023-03-27 RX ORDER — LEVOTHYROXINE SODIUM 150 UG/1
150 TABLET ORAL DAILY
Qty: 30 TABLET | Refills: 11 | Status: SHIPPED | OUTPATIENT
Start: 2023-03-27 | End: 2024-04-25

## 2023-04-03 ENCOUNTER — OFFICE VISIT (OUTPATIENT)
Dept: FAMILY MEDICINE | Facility: CLINIC | Age: 58
End: 2023-04-03
Payer: COMMERCIAL

## 2023-04-03 ENCOUNTER — MYC MEDICAL ADVICE (OUTPATIENT)
Dept: FAMILY MEDICINE | Facility: CLINIC | Age: 58
End: 2023-04-03

## 2023-04-03 VITALS
SYSTOLIC BLOOD PRESSURE: 162 MMHG | HEART RATE: 70 BPM | TEMPERATURE: 98.7 F | WEIGHT: 130.6 LBS | OXYGEN SATURATION: 98 % | HEIGHT: 64 IN | DIASTOLIC BLOOD PRESSURE: 92 MMHG | BODY MASS INDEX: 22.3 KG/M2 | RESPIRATION RATE: 18 BRPM

## 2023-04-03 DIAGNOSIS — M54.12 CERVICAL RADICULOPATHY: ICD-10-CM

## 2023-04-03 DIAGNOSIS — Z90.410 POST-PANCREATECTOMY DIABETES (H): ICD-10-CM

## 2023-04-03 DIAGNOSIS — E89.1 POST-PANCREATECTOMY DIABETES (H): ICD-10-CM

## 2023-04-03 DIAGNOSIS — Z01.818 PREOP GENERAL PHYSICAL EXAM: Primary | ICD-10-CM

## 2023-04-03 DIAGNOSIS — G47.33 OSA (OBSTRUCTIVE SLEEP APNEA): ICD-10-CM

## 2023-04-03 DIAGNOSIS — D50.8 OTHER IRON DEFICIENCY ANEMIA: ICD-10-CM

## 2023-04-03 DIAGNOSIS — E13.9 POST-PANCREATECTOMY DIABETES (H): ICD-10-CM

## 2023-04-03 LAB
BASOPHILS # BLD AUTO: 0.1 10E3/UL (ref 0–0.2)
BASOPHILS NFR BLD AUTO: 1 %
EOSINOPHIL # BLD AUTO: 0.1 10E3/UL (ref 0–0.7)
EOSINOPHIL NFR BLD AUTO: 2 %
ERYTHROCYTE [DISTWIDTH] IN BLOOD BY AUTOMATED COUNT: 14.8 % (ref 10–15)
HBA1C MFR BLD: 6.6 % (ref 0–5.6)
HCT VFR BLD AUTO: 36.4 % (ref 35–47)
HGB BLD-MCNC: 11.7 G/DL (ref 11.7–15.7)
LYMPHOCYTES # BLD AUTO: 3.1 10E3/UL (ref 0.8–5.3)
LYMPHOCYTES NFR BLD AUTO: 37 %
MCH RBC QN AUTO: 29.5 PG (ref 26.5–33)
MCHC RBC AUTO-ENTMCNC: 32.1 G/DL (ref 31.5–36.5)
MCV RBC AUTO: 92 FL (ref 78–100)
MONOCYTES # BLD AUTO: 0.9 10E3/UL (ref 0–1.3)
MONOCYTES NFR BLD AUTO: 11 %
NEUTROPHILS # BLD AUTO: 4.1 10E3/UL (ref 1.6–8.3)
NEUTROPHILS NFR BLD AUTO: 49 %
PLATELET # BLD AUTO: 356 10E3/UL (ref 150–450)
RBC # BLD AUTO: 3.96 10E6/UL (ref 3.8–5.2)
WBC # BLD AUTO: 8.4 10E3/UL (ref 4–11)

## 2023-04-03 PROCEDURE — 99214 OFFICE O/P EST MOD 30 MIN: CPT | Performed by: NURSE PRACTITIONER

## 2023-04-03 PROCEDURE — 36415 COLL VENOUS BLD VENIPUNCTURE: CPT | Performed by: NURSE PRACTITIONER

## 2023-04-03 PROCEDURE — 80053 COMPREHEN METABOLIC PANEL: CPT | Performed by: NURSE PRACTITIONER

## 2023-04-03 PROCEDURE — 85025 COMPLETE CBC W/AUTO DIFF WBC: CPT | Performed by: NURSE PRACTITIONER

## 2023-04-03 PROCEDURE — 93000 ELECTROCARDIOGRAM COMPLETE: CPT | Performed by: NURSE PRACTITIONER

## 2023-04-03 PROCEDURE — 83036 HEMOGLOBIN GLYCOSYLATED A1C: CPT | Performed by: NURSE PRACTITIONER

## 2023-04-03 ASSESSMENT — PATIENT HEALTH QUESTIONNAIRE - PHQ9
SUM OF ALL RESPONSES TO PHQ QUESTIONS 1-9: 8
10. IF YOU CHECKED OFF ANY PROBLEMS, HOW DIFFICULT HAVE THESE PROBLEMS MADE IT FOR YOU TO DO YOUR WORK, TAKE CARE OF THINGS AT HOME, OR GET ALONG WITH OTHER PEOPLE: SOMEWHAT DIFFICULT
SUM OF ALL RESPONSES TO PHQ QUESTIONS 1-9: 8

## 2023-04-03 NOTE — PROGRESS NOTES
[facility]  [address] [facility] [address]  Phone: [phone]  Primary Provider: Maryana Brooks  Pre-op Performing Provider: HAASE, SUSAN RACHELE  PREOPERATIVE EVALUATION:  Today's date: 4/3/2023    Lynn Thompson is a 58 year old female who presents for a preoperative evaluation.  Surgical Information:  Surgery/Procedure:  Pinched Nerve repair in C spine  Surgery Location: [facility] [place]  Surgeon: Dr. Inocencio Griffin  Surgery Date: 4/18/23  Time of Surgery: TBD  Where patient plans to recover: At home with family  Fax number for surgical facility: [phone]    Assessment & Plan     The proposed surgical procedure is considered INTERMEDIATE risk.    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    - Hemoglobin A1c    Risks and Recommendations:  The patient has the following additional risks and recommendations for perioperative complications:  Diabetes:  - Patient is on insulin therapy; diabetic NPO guidelines provided and discussed.  Obstructive Sleep Apnea:   Instructed to bring splint with her to use during post op period.  Anemia/Bleeding/Clotting:    - Anemia and does not require treatment prior to surgery. Monitor hemoglobin postoperatively    Medication Instructions:  Ibuprofen:  Hold 5 days prior to surgery.   Insulin pump:  Per endocrinology will not need any adjustment since the pump adjusts insulin administration based on glucose levels.     RECOMMENDATION:  APPROVAL GIVEN to proceed with proposed procedure, without further diagnostic evaluation.        Subjective     HPI related to upcoming procedure: sudden onset of cervical neck pain with radiculopathy of the right side.  Is currently taking ibuprofen and tylenol for pain.         3/31/2023    11:09 AM   Preop Questions   1.  Have you ever had a heart attack or stroke? No   2. Have you ever had surgery on your heart or blood vessels, such as a stent placement, a coronary artery bypass, or surgery on an artery in your head, neck, heart, or legs? No   3. Do you have chest pain with activity? No   4. Do you have a history of  heart failure? No   5. Do you currently have a cold, bronchitis or symptoms of other infection? No   6. Do you have a cough, shortness of breath, or wheezing? No   7. Do you or anyone in your family have previous history of blood clots? No   8. Do you or does anyone in your family have a serious bleeding problem such as prolonged bleeding following surgeries or cuts? No   9. Have you ever had problems with anemia or been told to take iron pills? YES - IV iron infusions   10. Have you had any abnormal blood loss such as black, tarry or bloody stools, or abnormal vaginal bleeding? No   11. Have you ever had a blood transfusion? YES -    11a. Have you ever had a transfusion reaction? No   12. Are you willing to have a blood transfusion if it is medically needed before, during, or after your surgery? Yes   13. Have you or any of your relatives ever had problems with anesthesia? No   14. Do you have sleep apnea, excessive snoring or daytime drowsiness? YES - sleep apnea, uses a splint   14a. Do you have a CPAP machine? No   15. Do you have any artifical heart valves or other implanted medical devices like a pacemaker, defibrillator, or continuous glucose monitor? No   16. Do you have artificial joints? No   17. Are you allergic to latex? No   18. Is there any chance that you may be pregnant? No       Health Care Directive:  Patient does not have a Health Care Directive or Living Will: Discussed advance care planning with patient; however, patient declined at this time.    Preoperative Review of :   reviewed - no record of controlled substances prescribed.        Status of Chronic Conditions:  See problem list for  active medical problems.      Patient with history of post pancreectomy diabetes mellitus on low amount of insulin currently via pump well controlled diabetes.     Patient with a history of obstructive sleep apnea using a night splint.     History of iron deficiency anemia over several years duration and undergoes IV iron infusions.     Patient with history of exocrine pancreatic insufficiency for which she had pancreatectomy 2013.     Patient with history of gastric bypass in 2003.     Patient with history of hypothyroidism.      Review of Systems  CONSTITUTIONAL: NEGATIVE for fever, chills, change in weight  INTEGUMENTARY/SKIN: NEGATIVE for worrisome rashes, moles or lesions  EYES: NEGATIVE for vision changes or irritation  ENT/MOUTH: NEGATIVE for ear, mouth and throat problems  RESP: NEGATIVE for cough or SOB  CV: NEGATIVE for chest pain, palpitations or peripheral edema  GI: NEGATIVE for nausea, abdominal pain, heartburn, or change in bowel habits  : NEGATIVE for frequency, dysuria, or hematuria  MUSCULOSKELETAL: cervical neck pain and right arm pain   NEURO: RUE numbness and tingling.   ENDOCRINE: NEGATIVE for temperature intolerance, skin/hair changes  HEME: NEGATIVE for bleeding problems  PSYCHIATRIC: NEGATIVE for changes in mood or affect    Patient Active Problem List    Diagnosis Date Noted     [facility] 05/03/2011     Priority: High     EMERGENCY CARE PLAN  Presenting Problem Signs and Symptoms Treatment Plan    Questions or conerns during clinic hours    I will call the clinic directly     Questions or conerns outside clinic hours    I will call the 24 hour nurse line at [phone]    Patient needs to schedule an appointment    I will call the 24 hour scheduling team at [phone] or clinic directly    Same day treatment     I will call the clinic first, nurse line if after hours, urgent care and express care if needed                            DX V65.8 REPLACED WITH [id number] [facility]  (04/08/2013)       History of uterine prolapse 01/12/2023     Priority: Medium     Vaginal atrophy 01/12/2023     Priority: Medium     Pyuria 09/19/2022     Priority: Medium     Kidney stone on left side 09/19/2022     Priority: Medium     Urge incontinence 07/07/2022     Priority: Medium     Urinary urgency 07/07/2022     Priority: Medium     High-tone pelvic floor dysfunction 07/07/2022     Priority: Medium     Small bowel obstruction (H) 11/23/2021     Priority: Medium     Uterovaginal prolapse, unspecified 11/03/2021     Priority: Medium     Rectal prolapse 11/03/2021     Priority: Medium     Other iron deficiency anemia 03/15/2021     Priority: Medium     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     Priority: Medium     Acquired hypothyroidism 11/03/2016     Priority: Medium     Thyroid nodule 11/01/2016     Priority: Medium     Dysthymia 03/01/2016     Priority: Medium     Anxiety 03/01/2016     Priority: Medium     H/O of rectopexy 12/29/2015     Priority: Medium     BLU (obstructive sleep apnea) 12/23/2015     Priority: Medium     Asplenia 10/24/2014     Priority: Medium     Exocrine pancreatic insufficiency 05/17/2013     Priority: Medium     Chronic pain syndrome 02/23/2013     Priority: Medium     Gastric bypass status for obesity 10/26/2010     Priority: Medium     Iron deficiency anemia secondary to inadequate dietary iron intake 12/28/2004     Priority: Medium     relates to gastric bypass        Past Medical History:   Diagnosis Date     Benign paroxysmal positional vertigo     occ.      Calculus of kidney 05/2005    x1 on L side passed, several stones.  Has been tested for oxalate.     Chronic abdominal pain 07/17/2013     Chronic pancreatitis 07/17/2013     Depression     also occ panic spells     Diabetes (H) 5/10/2013    Total Pancreatomy with Auto Islets Transplant     Dyspepsia 06/1999    H. pylori   treated     Headaches     still periodic HA's ;  often 5X/week     Hypertension 02/22/2016     Stress related     Iron deficiency anemia 2003    relates to gastric bypass     Post-pancreatectomy diabetes melltius 2013     Sleep apnea     uses splint     Spasm of sphincter of Oddi     surgical + endoscopic stenting of pancreatic duct @ [facility] 06     Thyroid nodule 2016     Past Surgical History:   Procedure Laterality Date     ABDOMINOPLASTY  2002    Tummy tuck     APPENDECTOMY  1990     BUNIONECTOMY Right 1998     CBD Stent placement  2002    CBD stent; Dr. Presley      SECTION       CHOLECYSTECTOMY       COLONOSCOPY N/A 2021    Procedure: COLONOSCOPY INCOMPLETE Aborted due to incomplete prep  will need to take additional prep and return tomorrow 21;  Surgeon: Ihsan Saenz MD;  Location: [facility]     COMBINED CYSTOSCOPY, RETROGRADES, URETEROSCOPY, LASER HOLMIUM LITHOTRIPSY URETER(S), INSERT STENT Right 2015    Procedure: COMBINED CYSTOSCOPY, RETROGRADES, URETEROSCOPY, LASER HOLMIUM LITHOTRIPSY URETER(S), INSERT STENT;  Surgeon: Kennedi Aldana MD;  [facility]     COMBINED CYSTOSCOPY, RETROGRADES, URETEROSCOPY, LASER HOLMIUM LITHOTRIPSY URETER(S), INSERT STENT Right 2015    Procedure: COMBINED CYSTOSCOPY, RETROGRADES, URETEROSCOPY, LASER HOLMIUM LITHOTRIPSY URETER(S), INSERT STENT;  Surgeon: Kennedi Aldana MD;  [facility]: [facility]     COSMETIC SURGERY  2002    Tummy tuck     CYSTECTOMY OVARIAN BENIGN Right      CYSTOSCOPY, RETROGRADES, INSERT STENT URETER(S), COMBINED  10/02/2012    Procedure: COMBINED CYSTOSCOPY, RETROGRADES, INSERT STENT URETER(S);  COMBINED CYSTOSCOPY,  , INSERT LEFT STENT URETER;  Surgeon: Johny Baez MD;  Location: [facility]     CYSTOSCOPY, RETROGRADES, INSERT STENT URETER(S), COMBINED Left 2022    Procedure: Cystoscopy with left retrograde pyelogram, left ureteroscopy, thulium laser lithotripsy of left ureteral calculus, stone basketing and left ureteral stent insertion;  Surgeon: Latricia  Alonzo PURDY MD;  Location: [facility]     ESOPHAGOSCOPY, GASTROSCOPY, DUODENOSCOPY (EGD), COMBINED N/A 01/24/2018    Procedure: COMBINED ESOPHAGOSCOPY, GASTROSCOPY, DUODENOSCOPY (EGD);  ESOPHAGOSCOPY, GASTROSCOPY, DUODENOSCOPY (EGD)    ;  Surgeon: Tamir Rodgers MD;  Location: [facility]     EXTRACORPOREAL SHOCK WAVE LITHOTRIPSY (ESWL)  10/16/2012    Procedure: EXTRACORPOREAL SHOCK WAVE LITHOTRIPSY (ESWL);  left EXTRACORPOREAL SHOCK WAVE LITHOTRIPSY (ESWL) ;  Surgeon: Johny Baez MD;  Location: [facility]     Gastric bypass NOS       HERNIA REPAIR  02/2015     HYSTERECTOMY SUPRACERVICAL, BILATERAL SALPINGO-OOPHORECTOMY, COMBINED N/A 02/01/2022    Procedure: Abdominal supracervical hysterectomy, bilateral salpingooophrectomy;  Surgeon: Nicole Rivera MD;  [facility]     IRRIGATION AND DEBRIDEMENT HAND, COMBINED Left 10/30/2020    Procedure: Left hand sharp excisional debridement of skin, subcutaneous tissue and fat with a scalpel, 2 x 1 x 1 cm.;  Surgeon: Demian Renteria MD;  Location: [facility]     LAPAROSCOPIC LYSIS ADHESIONS N/A 02/20/2015    Procedure: LAPAROSCOPIC LYSIS ADHESIONS;  Surgeon: Aaron Early MD;  [facility]     LAPAROSCOPIC LYSIS ADHESIONS N/A 12/29/2015    Procedure: LAPAROSCOPIC LYSIS ADHESIONS;  Surgeon: Aaron Early MD;  [facility]     PANCREATECTOMY, TRANSPLANT AUTO ISLET CELL, COMBINED  05/10/2013    Procedure: COMBINED PANCREATECTOMY, TRANSPLANT AUTO ISLET CELL;  Pancreatectomy, Auto Islet Cell Transplant   hernia repair, jejunostomy tube and liver biopsies with Anesthesia General with block;  Surgeon: Aaron Early MD;  [facility]     Partial ileum resection  1992     RECTOPEXY ABDOMINAL N/A 02/01/2022    Procedure: RECTOPEXY, ABDOMINAL;  Surgeon: Uriah Sheridan MD;  [facility]     REPAIR PTOSIS BROW BILATERAL Bilateral 06/09/2020    Procedure: BILATERAL BROW PTOSIS REPAIR;  Surgeon: Denise Alberts MD;  Location: [facility]     SACROCOLPOPEXY,  CYSTOSCOPY, COMBINED N/A 02/01/2022    Procedure: Uterosacral ligament suspension, pina colposuspension with Cystoscopy;  Surgeon: Nicole Rivera MD;  [facility]     SIGMOIDOSCOPY FLEXIBLE N/A 02/01/2022    Procedure: SIGMOIDOSCOPY, FLEXIBLE;  Surgeon: Uriah Sheridan MD;  [facility]     Surgery for SBO  2015     TONSILLECTOMY, ADENOIDECTOMY, COMBINED  1997     TRANSPLANT  5/10/13    Pancreatic Auto-Islet Transplant     Current Outpatient Medications   Medication Sig Dispense Refill     calcium carbonate 600 mg-vitamin D 400 units (CALTRATE) 600-400 MG-UNIT per tablet Take 1 tablet by mouth daily       Continuous Blood Gluc Sensor (DEXCOM G6 SENSOR) MISC 1 each every 10 days Change every 10 days. 3 each 5     Continuous Blood Gluc Transmit (DEXCOM G6 TRANSMITTER) MISC 1 each every 3 months Change every 3 months. 1 each 1     diclofenac (VOLTAREN) 1 % topical gel Apply 2 g topically 4 times daily 100 g 1     DULoxetine (CYMBALTA) 20 MG capsule Take 20 mg by mouth daily       escitalopram (LEXAPRO) 20 MG tablet Take 20 mg by mouth daily       estradiol (ESTRACE) 0.1 MG/GM vaginal cream Place vaginally twice a week PLACE 2 GRAMS VAGINALLY 2 TIMES WEEKLY 42.5 g 11     famotidine (PEPCID) 40 MG tablet Take 1 tablet (40 mg) by mouth 2 times daily as needed for heartburn 180 tablet 3     FIASP PENFILL 100 UNIT/ML SOCT Inject 0.5-4 Units Subcutaneous 4 times daily, INJECT with meals and nightly 15 mL 0     gabapentin (NEURONTIN) 300 MG capsule Take 300 mg by mouth nightly as needed       Glucagon (GVOKE HYPOPEN 1-PACK) 1 MG/0.2ML SOAJ Inject 1 mg Subcutaneous once as needed (for hypoglycemia with loss of consciousness) 15 mL 5     glucose 40 % GEL Take 15-30 g by mouth every 15 minutes as needed. 1 Tube 11     hydrOXYzine (ATARAX) 25 MG tablet TAKE 1-2 TABLETS BY MOUTH 2 TIMES DAILY AS NEEDED FOR ANXIETY  0     Insulin Aspart, w/Niacinamide, (FIASP) 100 UNIT/ML SOLN 15 Units by Subcutaneous  Infusion route daily Up to 85 units in continuous insulin pump 90 mL 1     Insulin Disposable Pump (OMNIPOD 5 G6 INTRO, GEN 5,) KIT 1 each continuous 1 kit 0     Insulin Disposable Pump (OMNIPOD 5 G6 POD, GEN 5,) MISC 1 each every 3 days 30 each 5     insulin glargine (LANTUS PEN) 100 UNIT/ML pen Inject 6 units daily in the event of pump failure 15 mL 0     insulin pen needle (32G X 4 MM) 32G X 4 MM miscellaneous Use 4-6 pen needles daily to inject subcutaneous insulin 200 each 11     levothyroxine (SYNTHROID/LEVOTHROID) 150 MCG tablet Take 1 tablet (150 mcg) by mouth daily 30 tablet 11     lipase-protease-amylase (VIOKACE) 24116-32602 units TABS tablet Take 5 with meals and 3 with snacks; approximately daily maximum of 20 capsules 500 tablet 11     loratadine (CLARITIN) 10 MG tablet Take 10 mg by mouth daily as needed        modafinil (PROVIGIL) 200 MG tablet Take 400 mg by mouth daily       omeprazole (PRILOSEC) 40 MG DR capsule Take 1 capsule (40 mg) by mouth 2 times daily Take 30-60 minutes before a meal. 180 capsule 3     potassium citrate-citric acid (CYTRA K) 1938-2743 MG Packet Take 1 packet by mouth 4 times daily (before meals and nightly) 100 packet 11     traZODone (DESYREL) 50 MG tablet Take 50 mg by mouth nightly as needed for sleep         Allergies   Allergen Reactions     Corticosteroids Other (See Comments)     All oral, IV and injectable steroids are contraindicated (unless in life threatening situations) in Islet Auto transplant recipients. They can cause irreversible loss of islet cell function. Please contact patient's transplant care coordinator, Erlinda Multani RN BSN at [phone]/pager: [phone] and endocrinologist prior to administration.       Povidone Iodine Hives     Causes skin to blister     Nsaids      naprosyn = GI upset     Sulfasalazine Nausea and Nausea and Vomiting        Social History     Tobacco Use     Smoking status: Never     Smokeless tobacco: Never   Vaping Use  "    Vaping status: Never Used   Substance Use Topics     Alcohol use: Not Currently       History   Drug Use Unknown         Objective     LMP 12/19/2013   BP (!) 162/92   Pulse 70   Temp 98.7  F (37.1  C) (Oral)   Resp 18   Ht 1.626 m (5' 4\")   Wt 59.2 kg (130 lb 9.6 oz)   LMP 12/19/2013   SpO2 98%   BMI 22.42 kg/m      Physical Exam    GENERAL APPEARANCE: healthy, alert and no distress     EYES: appear normal     HENT: ear canals and TM's normal and nose and mouth without ulcers or lesions     NECK: no adenopathy, no asymmetry, masses, or scars and thyroid normal to palpation     RESP: lungs clear to auscultation - no rales, rhonchi or wheezes     CV: regular rates and rhythm, normal S1 S2, no S3 or S4 and no murmur, click or rub     ABDOMEN:  soft, nontender, no HSM or masses and bowel sounds normal     MS: extremities normal- no gross deformities noted, no evidence of inflammation in joints, FROM in all extremities. Decreased flexion of neck, full hyperextension.       SKIN: no suspicious lesions or rashes     NEURO: Decreased strength of RUE.  Sensory exam grossly normal, mentation intact and speech normal     PSYCH: mentation appears normal. and affect normal/bright     LYMPHATICS: No cervical adenopathy    Diagnostics:  Recent Results (from the past 24 hour(s))   Hemoglobin A1c    Collection Time: 04/03/23  2:45 PM   Result Value Ref Range    Hemoglobin A1C 6.6 (H) 0.0 - 5.6 %   CBC with platelets and differential    Collection Time: 04/03/23  2:45 PM   Result Value Ref Range    WBC Count 8.4 4.0 - 11.0 10e3/uL    RBC Count 3.96 3.80 - 5.20 10e6/uL    Hemoglobin 11.7 11.7 - 15.7 g/dL    Hematocrit 36.4 35.0 - 47.0 %    MCV 92 78 - 100 fL    MCH 29.5 26.5 - 33.0 pg    MCHC 32.1 31.5 - 36.5 g/dL    RDW 14.8 10.0 - 15.0 %    Platelet Count 356 150 - 450 10e3/uL    % Neutrophils 49 %    % Lymphocytes 37 %    % Monocytes 11 %    % Eosinophils 2 %    % Basophils 1 %    Absolute Neutrophils 4.1 1.6 - 8.3 " 10e3/uL    Absolute Lymphocytes 3.1 0.8 - 5.3 10e3/uL    Absolute Monocytes 0.9 0.0 - 1.3 10e3/uL    Absolute Eosinophils 0.1 0.0 - 0.7 10e3/uL    Absolute Basophils 0.1 0.0 - 0.2 10e3/uL      EKG: appears normal, NSR, Normal Sinus Rhythm, normal axis, normal intervals, no acute ST/T changes c/w ischemia, no LVH by voltage criteria, unchanged from previous tracings    Revised Cardiac Risk Index (RCRI):  The patient has the following serious cardiovascular risks for perioperative complications:   - No serious cardiac risks = 0 points     RCRI Interpretation: 0 points: Class I (very low risk - 0.4% complication rate)           Signed Electronically by: Susan Haase, APRN CNP  Copy of this evaluation report is provided to requesting physician.      Answers for HPI/ROS submitted by the patient on 4/3/2023  If you checked off any problems, how difficult have these problems made it for you to do your work, take care of things at home, or get along with other people?: Somewhat difficult  PHQ9 TOTAL SCORE: 8

## 2023-04-03 NOTE — PATIENT INSTRUCTIONS
For informational purposes only. Not to replace the advice of your health care provider. Copyright   2003,  [facility]. All rights reserved. Clinically reviewed by Ruby Rivas MD. Primekss [id number] - REV .  Preparing for Your Surgery  Getting started  A nurse will call you to review your health history and instructions. They will give you an arrival time based on your scheduled surgery time. Please be ready to share:    Your doctor's clinic name and phone number    Your medical, surgical, and anesthesia history    A list of allergies and sensitivities    A list of medicines, including herbal treatments and over-the-counter drugs    Whether the patient has a legal guardian (ask how to send us the papers in advance)  Please tell us if you're pregnant--or if there's any chance you might be pregnant. Some surgeries may injure a fetus (unborn baby), so they require a pregnancy test. Surgeries that are safe for a fetus don't always need a test, and you can choose whether to have one.   If you have a child who's having surgery, please ask for a copy of Preparing for Your Child's Surgery.    Preparing for surgery    Within 10 to 30 days of surgery: Have a pre-op exam (sometimes called an H&P, or History and Physical). This can be done at a clinic or pre-operative center.  ? If you're having a , you may not need this exam. Talk to your care team.    At your pre-op exam, talk to your care team about all medicines you take. If you need to stop any medicines before surgery, ask when to start taking them again.  ? We do this for your safety. Many medicines can make you bleed too much during surgery. Some change how well surgery (anesthesia) drugs work.    Call your insurance company to let them know you're having surgery. (If you don't have insurance, call [phone].)    Call your clinic if there's any change in your health. This includes signs of a cold or flu (sore throat, runny nose,  cough, rash, fever). It also includes a scrape or scratch near the surgery site.    If you have questions on the day of surgery, call your hospital or surgery center.  Eating and drinking guidelines  For your safety: Unless your surgeon tells you otherwise, follow the guidelines below.    Eat and drink as usual until 8 hours before you arrive for surgery. After that, no food or milk.    Drink clear liquids until 2 hours before you arrive. These are liquids you can see through, like water, Gatorade, and Propel Water. They also include plain black coffee and tea (no cream or milk), candy, and breath mints. You can spit out gum when you arrive.    If you drink alcohol: Stop drinking it the night before surgery.    If your care team tells you to take medicine on the morning of surgery, it's okay to take it with a sip of water.  Preventing infection    Shower or bathe the night before and morning of your surgery. Follow the instructions your clinic gave you. (If no instructions, use regular soap.)    Don't shave or clip hair near your surgery site. We'll remove the hair if needed.    Don't smoke or vape the morning of surgery. You may chew nicotine gum up to 2 hours before surgery. A nicotine patch is okay.  ? Note: Some surgeries require you to completely quit smoking and nicotine. Check with your surgeon.    Your care team will make every effort to keep you safe from infection. We will:  ? Clean our hands often with soap and water (or an alcohol-based hand rub).  ? Clean the skin at your surgery site with a special soap that kills germs.  ? Give you a special gown to keep you warm. (Cold raises the risk of infection.)  ? Wear special hair covers, masks, gowns and gloves during surgery.  ? Give antibiotic medicine, if prescribed. Not all surgeries need antibiotics.  What to bring on the day of surgery    Photo ID and insurance card    Copy of your health care directive, if you have one    Glasses and hearing aids (bring  cases)  ? You can't wear contacts during surgery    Inhaler and eye drops, if you use them (tell us about these when you arrive)    CPAP machine or breathing device, if you use them    A few personal items, if spending the night    If you have . . .  ? A pacemaker, ICD (cardiac defibrillator) or other implant: Bring the ID card.  ? An implanted stimulator: Bring the remote control.  ? A legal guardian: Bring a copy of the certified (court-stamped) guardianship papers.  Please remove any jewelry, including body piercings. Leave jewelry and other valuables at home.  If you're going home the day of surgery    You must have a responsible adult drive you home. They should stay with you overnight as well.    If you don't have someone to stay with you, and you aren't safe to go home alone, we may keep you overnight. Insurance often won't pay for this.  After surgery  If it's hard to control your pain or you need more pain medicine, please call your surgeon's office.  Questions?   If you have any questions for your care team, list them here: _________________________________________________________________________________________________________________________________________________________________________ ____________________________________ ____________________________________ ____________________________________

## 2023-04-04 LAB
ALBUMIN SERPL BCG-MCNC: 4.3 G/DL (ref 3.5–5.2)
ALP SERPL-CCNC: 122 U/L (ref 35–104)
ALT SERPL W P-5'-P-CCNC: 30 U/L (ref 10–35)
ANION GAP SERPL CALCULATED.3IONS-SCNC: 10 MMOL/L (ref 7–15)
AST SERPL W P-5'-P-CCNC: 30 U/L (ref 10–35)
BILIRUB SERPL-MCNC: 0.2 MG/DL
BUN SERPL-MCNC: 19.5 MG/DL (ref 8–23)
CALCIUM SERPL-MCNC: 9.7 MG/DL (ref 8.8–10.2)
CHLORIDE SERPL-SCNC: 110 MMOL/L (ref 98–107)
CREAT SERPL-MCNC: 0.77 MG/DL (ref 0.51–0.95)
DEPRECATED HCO3 PLAS-SCNC: 24 MMOL/L (ref 22–29)
GFR SERPL CREATININE-BSD FRML MDRD: 88 ML/MIN/1.73M2
GLUCOSE SERPL-MCNC: 87 MG/DL (ref 70–99)
POTASSIUM SERPL-SCNC: 4.3 MMOL/L (ref 3.4–5.3)
PROT SERPL-MCNC: 6.7 G/DL (ref 6.4–8.3)
SODIUM SERPL-SCNC: 144 MMOL/L (ref 136–145)

## 2023-04-11 ENCOUNTER — VIRTUAL VISIT (OUTPATIENT)
Dept: ONCOLOGY | Facility: CLINIC | Age: 58
End: 2023-04-11
Attending: INTERNAL MEDICINE
Payer: COMMERCIAL

## 2023-04-11 DIAGNOSIS — D50.8 OTHER IRON DEFICIENCY ANEMIA: Primary | ICD-10-CM

## 2023-04-11 PROCEDURE — 99213 OFFICE O/P EST LOW 20 MIN: CPT | Mod: VID | Performed by: PHYSICIAN ASSISTANT

## 2023-04-11 PROCEDURE — G0463 HOSPITAL OUTPT CLINIC VISIT: HCPCS | Mod: PN,GT | Performed by: PHYSICIAN ASSISTANT

## 2023-04-11 RX ORDER — TRAMADOL HYDROCHLORIDE 50 MG/1
TABLET ORAL
Status: ON HOLD | COMMUNITY
Start: 2023-04-10 | End: 2023-07-01

## 2023-04-11 NOTE — NURSING NOTE
Patient denies any changes since echeck-in regarding medication and allergies and states all information entered during echeck-in remains accurate.    Is the patient currently in the state of [place]? YES    Visit mode:VIDEO    If the visit is dropped, the patient can be reconnected by: VIDEO VISIT: Text to cell phone: [phone]    Will anyone else be joining the visit? NO      How would you like to obtain your AVS? MyChart    Are changes needed to the allergy or medication list? YES: Pt states she is now taking Tramadol for pain reconciled and reviewed with pt.     Marilynn Love, Visit Facilitator/MA.

## 2023-04-11 NOTE — LETTER
4/11/2023         RE: Lynn Thompson  [address] [id number]        Dear Colleague,    Thank you for referring your patient, Lynn Thompson, to the [facility]. Please see a copy of my visit note below.    Oncology/Hematology Visit Note  Apr 11, 2023    Reason for Visit: Follow up of iron deficiency anemia    Primary Hematologist: Dr. 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        Lynn says that she has had anemia all of her life.  She was previously followed by Dr. Tom Malcolm at [facility] since 2004.  She was followed and treated for iron-deficiency anemia.  It was felt that it was due to poor absorption from her gastric bypass and various bowel surgeries in the past.  She was unable to tolerate oral and and could not absorb it.  She gets IV iron intermittently.  She says a round of IV iron would last her for several years.  She last saw Dr. Malcolm in 2019.       Patient has been anemic again.  She underwent colonoscopy on 4/1/21, which found some polyps that were removed.  Due to unintentional weight loss, she underwent mammogram that was negative.  She had a CT abdomen/pelvis on 3/1/21 that was normal, other than non-specific proximal colitis.  She does not smoke.       She received Injectafer x 2 doses in May 2021.    Interval History:  Doing well. Reviewed repeat labs which showed mild anemia but normal iron levels. She denies symptoms of anemia or iron deficiency such as pica, fatigue, oral ulcer, restless legs, hair thinning. No bleeding. She does have a chronic pinched neck nerve with right arm paresthesias and will have surgery next week. She  is currently caring for a foster dog and her 9 day old puppies.     Review of Systems:  A complete review of systems was negative except as noted in the HPI.     Past medical, Surgical, and social history:  Reviewed    Physical Examination:  General: Pleasant patient in no acute distress. No pallor. Normal work of breathing. Oriented and alert.     Laboratory Data:  CBC and iron studies reviewed.   Ferritin   Date Value Ref Range Status   03/24/2023 44 11 - 328 ng/mL Final   05/10/2021 7 (L) 8 - 252 ng/mL Final     Iron   Date Value Ref Range Status   03/24/2023 118 37 - 145 ug/dL Final   05/10/2021 65 35 - 180 ug/dL Final     Iron Binding Cap   Date Value Ref Range Status   05/10/2021 378 240 - 430 ug/dL Final     Iron Binding Capacity   Date Value Ref Range Status   03/24/2023 310 240 - 430 ug/dL Final   03/07/2022 293 240 - 430 ug/dL Final       CBC RESULTS: Recent Labs   Lab Test 04/03/23  1445   WBC 8.4   RBC 3.96   HGB 11.7   HCT 36.4   MCV 92   MCH 29.5   MCHC 32.1   RDW 14.8        Assessment and Plan:  Lynn Thompson is a 58 year old female with history of multiple bowel surgeries and iron deficiency anemia.     1. Anemia  2. History of Iron deficiency  Patient has long-standing history of iron deficiency anemia at least since 2004. Last received IV Injectafer x 2 in 5/2021. She is up to date on GI screenings, most recent colonoscopy with last 4/2021. Most recent labs 3/2023 show normal iron levels and hemoglobin only slightly low at 11.7, technically normal. She would not benefit from oral iron due to history of gastric bypass and poor tolerability. She does not currently need IV iron. Has previously received Injectafer with improvement in levels and symptoms. Will recheck labs in 3 months via chart check then follow-up with Dr. Perla in 6 months with labs. She does have a neck surgery planned next week. If large blood loss noted, surgery will certainly monitor as needed. She has previously  tolerated surgeries with slight hemoglobin decline as expected followed by brownlee recovery. With current great hemoglobin and iron levels, no increased monitoring indicated.     20 minutes spent on the date of the encounter doing chart review, review of test results, interpretation of tests, patient visit and documentation     Haydee Mcdowell PA-C  [facility]   [phone]    Chart documentation with Dragon Voice recognition Software. Although reviewed after completion, some words and grammatical errors may remain.    Virtual Visit Details    Type of service:  Video Visit     Originating Location (pt. Location): Home  Distant Location (provider location):  On-site  Platform used for Video Visit: Lizbeth              Again, thank you for allowing me to participate in the care of your patient.        Sincerely,        HAYDEE MCDOWELL PA-C

## 2023-04-11 NOTE — PROGRESS NOTES
Oncology/Hematology Visit Note  Apr 11, 2023    Reason for Visit: Follow up of iron deficiency anemia    Primary Hematologist: Dr. 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        Lynn says that she has had anemia all of her life.  She was previously followed by Dr. Tom Malcolm at [facility] since 2004.  She was followed and treated for iron-deficiency anemia.  It was felt that it was due to poor absorption from her gastric bypass and various bowel surgeries in the past.  She was unable to tolerate oral and and could not absorb it.  She gets IV iron intermittently.  She says a round of IV iron would last her for several years.  She last saw Dr. Malcolm in 2019.       Patient has been anemic again.  She underwent colonoscopy on 4/1/21, which found some polyps that were removed.  Due to unintentional weight loss, she underwent mammogram that was negative.  She had a CT abdomen/pelvis on 3/1/21 that was normal, other than non-specific proximal colitis.  She does not smoke.       She received Injectafer x 2 doses in May 2021.    Interval History:  Doing well. Reviewed repeat labs which showed mild anemia but normal iron levels. She denies symptoms of anemia or iron deficiency such as pica, fatigue, oral ulcer, restless legs, hair thinning. No bleeding. She does have a chronic pinched neck nerve with right arm paresthesias and will have surgery next week. She is currently caring for a foster dog and her 9 day old puppies.     Review of Systems:  A complete review of systems was negative except as noted in the HPI.     Past medical, Surgical, and social history:  Reviewed    Physical Examination:  General: Pleasant  patient in no acute distress. No pallor. Normal work of breathing. Oriented and alert.     Laboratory Data:  CBC and iron studies reviewed.   Ferritin   Date Value Ref Range Status   03/24/2023 44 11 - 328 ng/mL Final   05/10/2021 7 (L) 8 - 252 ng/mL Final     Iron   Date Value Ref Range Status   03/24/2023 118 37 - 145 ug/dL Final   05/10/2021 65 35 - 180 ug/dL Final     Iron Binding Cap   Date Value Ref Range Status   05/10/2021 378 240 - 430 ug/dL Final     Iron Binding Capacity   Date Value Ref Range Status   03/24/2023 310 240 - 430 ug/dL Final   03/07/2022 293 240 - 430 ug/dL Final       CBC RESULTS: Recent Labs   Lab Test 04/03/23  1445   WBC 8.4   RBC 3.96   HGB 11.7   HCT 36.4   MCV 92   MCH 29.5   MCHC 32.1   RDW 14.8        Assessment and Plan:  Lynn Thompson is a 58 year old female with history of multiple bowel surgeries and iron deficiency anemia.     1. Anemia  2. History of Iron deficiency  Patient has long-standing history of iron deficiency anemia at least since 2004. Last received IV Injectafer x 2 in 5/2021. She is up to date on GI screenings, most recent colonoscopy with last 4/2021. Most recent labs 3/2023 show normal iron levels and hemoglobin only slightly low at 11.7, technically normal. She would not benefit from oral iron due to history of gastric bypass and poor tolerability. She does not currently need IV iron. Has previously received Injectafer with improvement in levels and symptoms. Will recheck labs in 3 months via chart check then follow-up with Dr. Perla in 6 months with labs. She does have a neck surgery planned next week. If large blood loss noted, surgery will certainly monitor as needed. She has previously tolerated surgeries with slight hemoglobin decline as expected followed by brownlee recovery. With current great hemoglobin and iron levels, no increased monitoring indicated.     20 minutes spent on the date of the encounter doing chart review, review of test results,  interpretation of tests, patient visit and documentation     Haydee Krishnamurthy PA-C  [facility]   [phone]    Chart documentation with Dragon Voice recognition Software. Although reviewed after completion, some words and grammatical errors may remain.    Virtual Visit Details    Type of service:  Video Visit     Originating Location (pt. Location): Home  Distant Location (provider location):  On-site  Platform used for Video Visit: Lizbeth

## 2023-04-27 ENCOUNTER — MYC REFILL (OUTPATIENT)
Dept: FAMILY MEDICINE | Facility: CLINIC | Age: 58
End: 2023-04-27
Payer: COMMERCIAL

## 2023-04-27 NOTE — TELEPHONE ENCOUNTER
Routing refill request to provider for review/approval because:  Drug not on the [facility] refill protocol   Neel Zarate RN, BSN

## 2023-04-28 RX ORDER — MODAFINIL 200 MG/1
400 TABLET ORAL DAILY
OUTPATIENT
Start: 2023-04-28

## 2023-04-28 NOTE — TELEPHONE ENCOUNTER
I do not believe I have filled this script for her in the past. Last filled 2014??? Please call for more info.     JH

## 2023-05-02 ENCOUNTER — VIRTUAL VISIT (OUTPATIENT)
Dept: NEPHROLOGY | Facility: CLINIC | Age: 58
End: 2023-05-02
Payer: COMMERCIAL

## 2023-05-02 VITALS — WEIGHT: 125 LBS | BODY MASS INDEX: 21.46 KG/M2

## 2023-05-02 DIAGNOSIS — N20.0 NEPHROLITHIASIS: ICD-10-CM

## 2023-05-02 DIAGNOSIS — R82.991 HYPOCITRATURIA: ICD-10-CM

## 2023-05-02 DIAGNOSIS — R82.992 ENTERIC HYPEROXALURIA: ICD-10-CM

## 2023-05-02 PROCEDURE — 99205 OFFICE O/P NEW HI 60 MIN: CPT | Mod: VID | Performed by: INTERNAL MEDICINE

## 2023-05-02 RX ORDER — POTASSIUM CITRATE 10 MEQ/1
10 TABLET, EXTENDED RELEASE ORAL
Qty: 60 TABLET | Refills: 11 | Status: SHIPPED | OUTPATIENT
Start: 2023-05-02 | End: 2023-12-04

## 2023-05-02 ASSESSMENT — PAIN SCALES - GENERAL: PAINLEVEL: MODERATE PAIN (5)

## 2023-05-02 NOTE — LETTER
5/2/2023       RE: Lynn Thompson  [address] [facility] [id number]     Dear Colleague,    Thank you for referring your patient, Lynn Thompson, to the [facility] [place] at [facility]. Please see a copy of my visit note below.    [facility]ph[facility]  05/02/2023     Lynn Thompson MRN:[id number] YOB: 1965  Primary care provider: Maryana Brooks  Requesting physician: Alonzo Rome     ASSESSMENT AND RECOMMENDATIONS:   Lynn Thompson is a 58 year old presenting for nephrolithiasis.  # Recurrent kidney stones: stone type primarily calcium oxalate though 2 analysis showed small component of calcium phosphate  - has had multiple operations and also very strong family history so likely combination of genetics along with enteric hyperoxaluria after gastric bypass and pancreatectomy    # enteric hyperoxaluria - improved but not controlled on calcium supplements (was not on calcium supplements initially in 2015 when urine collection showed urine oxalate >100 mg and urine calcium <100 mg)  - briefly discussed some high oxalate foods, keep potato portions small, avoid spinach    # acceptable renal function based on Oct 2022 studies  - iohexol GFR 66 ml/min/1.73 m2 (raw clearance 59 ml/min)  - cystatin C GFR 73  - creatinine based eGFR 80's so creatinine does overestimate renal function in Lynn    # history Quinn en Y gastric bypass  # history Crohns - s/p partial resection    At risk for making more kidney stones so will benefit from urine chemistry/supersaturation evaluation and monitoring and regular follow up for kidney stone prevention.    Other co-morbidities:  # history chronic pancreatitis  # total pancreatectomy with auto islet 2013 - follows with Dr. 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     Repeat 24 hour chemistries in 2023  Repeat imaging per Dr. Rome  Return in about 3 months (around 2023).     60 minutes spent on visit, meeting with Lynn HENNING Jay and in chart review and documentation on date of encounter, 23      Hannah Murcia MD  [facility]  Department of Medicine  Division of Renal Disease and Hypertension  [facility]  Vocera Web Console        REASON FOR CONSULT: nephrolithiasis    HISTORY OF PRESENT ILLNESS:  Lynn Thompson is a 58 year old with history Crohn's with ileal resection , gale en Y gastric bypass (), chronic pancreatitis s/p pancreatectomy and auto islet cell transplant () with feeding tube 4-5 months after, post pancreatectomy diabetes (), kidney stones who presents for follow up. Previously saw Dr. Solano, last visit in , for recurrent kidney stones with severe hyperoxaluria was managed with calcium carbonate suspension and potassium citrate.    Has history of intermittent SBO and in past dependent on liquid diet, has required lysis of adhesions in past multiple times.    Stone surgical history:   2022 Left URS, laser lithotripsy, stone basketing  2015 right URS and lithotripsy with stone basketing  3/23/15 right URS, lithotripsy  10/16/2012 left ESWL  3/2011 right stent for ureteral stone and obstruction  2011 right URS and stone extraction  3/5/2008 right and left ESWL    Last imagin2022 ultrasound - no stones mentioned on report    Stone [place]:   Left: cleared 99% 2022  Right: 4 mm lower pole stone noted on CT 2022    Diet:  Cannot eat a whole lot in one sitting, grazes  Watches  what she eats  Proteins - chicken, eggs, ground beef or ground turkey, fish  Calcium - yogurt, cheese, calcium citrate with vitamin D - 1 pill am and 1 pill pm  Some whole beans and grains  Fruit and vegetable - watches sugar content of fruit, berries, grapefruit, watermelon, pineapple, oranges  Broccoli, stir lovell, chopped cabbage salad, carrots, spinach, potato    Never feels hungry, has to make herself eat, usually does not eat breakfast    Fluids - 1 cup coffee, 1 diet coke, water (plain), seldom herbal tea, alcohol less than 4 drinks per year    Has diarrhea/dumping with too much sugar, since rectal prolapse, has been more diligent with foods that impact GI system    Had recent spine surgery.     I reviewed 24 hour urine chemstries:            Family history is positive for kidney stones in several siblings, older brother has had hundreds, father and grandfather also had kidney stones    REVIEW OF SYSTEMS:  A 10 point review of systems was negative except as noted above.    Problem list  Patient Active Problem List   Diagnosis    Iron deficiency anemia secondary to inadequate dietary iron intake    Gastric bypass status for obesity    Health Care Home    Chronic pain syndrome    Exocrine pancreatic insufficiency    Asplenia    BLU (obstructive sleep apnea)    H/O of rectopexy    Dysthymia    Anxiety    Thyroid nodule    Acquired hypothyroidism    Post-pancreatectomy diabetes (H)    Other iron deficiency anemia    Uterovaginal prolapse, unspecified    Rectal prolapse    Small bowel obstruction (H)    Urge incontinence    Urinary urgency    High-tone pelvic floor dysfunction    Pyuria    Kidney stone on left side    History of uterine prolapse    Vaginal atrophy     PSH  Past Surgical History:   Procedure Laterality Date    ABDOMINOPLASTY  2002    Tummy tuck    APPENDECTOMY  1990    BUNIONECTOMY Right 1998    CBD Stent placement  2002    CBD stent; Dr. Presley     SECTION       CHOLECYSTECTOMY      COLONOSCOPY N/A 03/31/2021    Procedure: COLONOSCOPY INCOMPLETE Aborted due to incomplete prep  will need to take additional prep and return tomorrow 4/1/21;  Surgeon: Ihsan Saenz MD;  Location: [facility]    COMBINED CYSTOSCOPY, RETROGRADES, URETEROSCOPY, LASER HOLMIUM LITHOTRIPSY URETER(S), INSERT STENT Right 03/23/2015    Procedure: COMBINED CYSTOSCOPY, RETROGRADES, URETEROSCOPY, LASER HOLMIUM LITHOTRIPSY URETER(S), INSERT STENT;  Surgeon: Kennedi Aldana MD;  Location: [facility]    COMBINED CYSTOSCOPY, RETROGRADES, URETEROSCOPY, LASER HOLMIUM LITHOTRIPSY URETER(S), INSERT STENT Right 04/20/2015    Procedure: COMBINED CYSTOSCOPY, RETROGRADES, URETEROSCOPY, LASER HOLMIUM LITHOTRIPSY URETER(S), INSERT STENT;  Surgeon: Kennedi Aldana MD;  Location: [facility]    COSMETIC SURGERY  6/24/2002    Tummy tuck    CYSTECTOMY OVARIAN BENIGN Right     CYSTOSCOPY, RETROGRADES, INSERT STENT URETER(S), COMBINED  10/02/2012    Procedure: COMBINED CYSTOSCOPY, RETROGRADES, INSERT STENT URETER(S);  COMBINED CYSTOSCOPY,  , INSERT LEFT STENT URETER;  Surgeon: Johny Baez MD;  Location: [facility]    CYSTOSCOPY, RETROGRADES, INSERT STENT URETER(S), COMBINED Left 9/20/2022    Procedure: Cystoscopy with left retrograde pyelogram, left ureteroscopy, thulium laser lithotripsy of left ureteral calculus, stone basketing and left ureteral stent insertion;  Surgeon: Alonzo Rome MD;  Location: [facility]    ESOPHAGOSCOPY, GASTROSCOPY, DUODENOSCOPY (EGD), COMBINED N/A 01/24/2018    Procedure: COMBINED ESOPHAGOSCOPY, GASTROSCOPY, DUODENOSCOPY (EGD);  ESOPHAGOSCOPY, GASTROSCOPY, DUODENOSCOPY (EGD)    ;  Surgeon: Tamir Rodgers MD;  Location: [facility]    EXTRACORPOREAL SHOCK WAVE LITHOTRIPSY (ESWL)  10/16/2012    Procedure: EXTRACORPOREAL SHOCK WAVE LITHOTRIPSY (ESWL);  left EXTRACORPOREAL SHOCK WAVE LITHOTRIPSY (ESWL) ;  Surgeon: Johny Baez MD;  Location: [facility]    Gastric bypass NOS      HERNIA REPAIR   02/2015    HYSTERECTOMY SUPRACERVICAL, BILATERAL SALPINGO-OOPHORECTOMY, COMBINED N/A 02/01/2022    Procedure: Abdominal supracervical hysterectomy, bilateral salpingooophrectomy;  Surgeon: Nicole Rivera MD;  [facility]    IRRIGATION AND DEBRIDEMENT HAND, COMBINED Left 10/30/2020    Procedure: Left hand sharp excisional debridement of skin, subcutaneous tissue and fat with a scalpel, 2 x 1 x 1 cm.;  Surgeon: Demian Renteria MD;  Location: [facility]    LAPAROSCOPIC LYSIS ADHESIONS N/A 02/20/2015    Procedure: LAPAROSCOPIC LYSIS ADHESIONS;  Surgeon: Aaron Early MD;  [facility]    LAPAROSCOPIC LYSIS ADHESIONS N/A 12/29/2015    Procedure: LAPAROSCOPIC LYSIS ADHESIONS;  Surgeon: Aaron Early MD;  [facility]    PANCREATECTOMY, TRANSPLANT AUTO ISLET CELL, COMBINED  05/10/2013    Procedure: COMBINED PANCREATECTOMY, TRANSPLANT AUTO ISLET CELL;  Pancreatectomy, Auto Islet Cell Transplant   hernia repair, jejunostomy tube and liver biopsies with Anesthesia General with block;  Surgeon: Aaron Early MD;  [facility]    Partial ileum resection  1992    RECTOPEXY ABDOMINAL N/A 02/01/2022    Procedure: RECTOPEXY, ABDOMINAL;  Surgeon: Uriah Sheridan MD;  [facility]    REPAIR PTOSIS BROW BILATERAL Bilateral 06/09/2020    Procedure: BILATERAL BROW PTOSIS REPAIR;  Surgeon: Denise Alberts MD;  Location: [facility]    SACROCOLPOPEXY, CYSTOSCOPY, COMBINED N/A 02/01/2022    Procedure: Uterosacral ligament suspension, pina colposuspension with Cystoscopy;  Surgeon: Nicole Rivera MD;  [facility]    SIGMOIDOSCOPY FLEXIBLE N/A 02/01/2022    Procedure: SIGMOIDOSCOPY, FLEXIBLE;  Surgeon: Uriah Sheridan MD;  [facility]    Surgery for SBO  2015    TONSILLECTOMY, ADENOIDECTOMY, COMBINED  1997    TRANSPLANT  5/10/13    Pancreatic Auto-Islet Transplant       Social  Disability  Rescues dogs  Social History     Socioeconomic History    Marital status:       Spouse name: Tera    Number of children: 2    Years of education: Not on file    Highest education level: Some college, no degree   Occupational History    Occupation: customer service     Employer: [employer]   Tobacco Use    Smoking status: Never    Smokeless tobacco: Never   Vaping Use    Vaping status: Never Used   Substance and Sexual Activity    Alcohol use: Not Currently    Drug use: Not Currently    Sexual activity: Yes     Partners: Male     Birth control/protection: Post-menopausal   Other Topics Concern    Parent/sibling w/ CABG, MI or angioplasty before 65F 55M? No   Social History Narrative    Not on file     Social Determinants of Health     Financial Resource Strain: Medium Risk (12/9/2022)    Overall Financial Resource Strain (CARDIA)     Difficulty of Paying Living Expenses: Somewhat hard   Food Insecurity: No Food Insecurity (12/9/2022)    Hunger Vital Sign     Worried About Running Out of Food in the Last Year: Never true     Ran Out of Food in the Last Year: Never true   Transportation Needs: No Transportation Needs (12/9/2022)    PRAPARE - Transportation     Lack of Transportation (Medical): No     Lack of Transportation (Non-Medical): No   Physical Activity: Insufficiently Active (12/9/2022)    Exercise Vital Sign     Days of Exercise per Week: 2 days     Minutes of Exercise per Session: 30 min   Stress: Stress Concern Present (12/9/2022)    [place] [employer] - Occupational Stress Questionnaire     Feeling of Stress : To some extent   Social Connections: Socially Integrated (12/9/2022)    Social Connection and Isolation Panel [NHANES]     Frequency of Communication with Friends and Family: Twice a week     Frequency of Social Gatherings with Friends and Family: Once a week     Attends Quaker Services: More than 4 times per year     Active Member of Clubs or Organizations: Yes     Attends Club or Organization Meetings: Not on file     Marital Status:    Intimate  Partner Violence: Not At Risk (2021)    Humiliation, Afraid, Rape, and Kick questionnaire     Fear of Current or Ex-Partner: No     Emotionally Abused: No     Physically Abused: No     Sexually Abused: No   Housing Stability: Low Risk  (2022)    Housing Stability Vital Sign     Unable to Pay for Housing in the Last Year: No     Number of Places Lived in the Last Year: 1     Unstable Housing in the Last Year: No     Family history  Family History   Problem Relation Age of Onset    Family History Negative Mother     Respiratory Father         COPD;  at 69    Genitourinary Problems Father         kidney stones    Substance Abuse Father     Depression Father     Asthma Father     Heart Disease Paternal Grandfather         M.I.    Coronary Artery Disease Paternal Grandfather     Hyperlipidemia Paternal Grandfather     Genitourinary Problems Brother         multiple brothers with kidney stones    Gastrointestinal Disease Maternal Grandmother         undiagnosed 'gut' issues    Coronary Artery Disease Maternal Grandfather     Hyperlipidemia Maternal Grandfather     Cerebrovascular Disease Paternal Grandmother         At the age of 103    Anxiety Disorder Paternal Grandmother     Osteoporosis Paternal Grandmother     Anxiety Disorder Son     Anxiety Disorder Daughter     Asthma Daughter     Colon Cancer No family hx of           MEDICATIONS:  Current Outpatient Medications   Medication Sig Dispense Refill    calcium carbonate 600 mg-vitamin D 400 units (CALTRATE) 600-400 MG-UNIT per tablet Take 1 tablet by mouth daily      Continuous Blood Gluc Sensor (DEXCOM G6 SENSOR) MISC 1 each every 10 days Change every 10 days. 3 each 5    Continuous Blood Gluc Transmit (DEXCOM G6 TRANSMITTER) MISC 1 each every 3 months Change every 3 months. 1 each 1    DULoxetine (CYMBALTA) 20 MG capsule Take 20 mg by mouth daily      escitalopram (LEXAPRO) 20 MG tablet Take 20 mg by mouth daily      estradiol (ESTRACE) 0.1 MG/GM  vaginal cream Place vaginally twice a week PLACE 2 GRAMS VAGINALLY 2 TIMES WEEKLY 42.5 g 11    famotidine (PEPCID) 40 MG tablet Take 1 tablet (40 mg) by mouth 2 times daily as needed for heartburn 180 tablet 3    FIASP PENFILL 100 UNIT/ML SOCT Inject 0.5-4 Units Subcutaneous 4 times daily, INJECT with meals and nightly 15 mL 0    gabapentin (NEURONTIN) 300 MG capsule Take 300 mg by mouth nightly as needed      Glucagon (GVOKE HYPOPEN 1-PACK) 1 MG/0.2ML SOAJ Inject 1 mg Subcutaneous once as needed (for hypoglycemia with loss of consciousness) 15 mL 5    glucose 40 % GEL Take 15-30 g by mouth every 15 minutes as needed. 1 Tube 11    hydrOXYzine (ATARAX) 25 MG tablet TAKE 1-2 TABLETS BY MOUTH 2 TIMES DAILY AS NEEDED FOR ANXIETY  0    Insulin Aspart, w/Niacinamide, (FIASP) 100 UNIT/ML SOLN 15 Units by Subcutaneous Infusion route daily Up to 85 units in continuous insulin pump 90 mL 1    Insulin Disposable Pump (OMNIPOD 5 G6 INTRO, GEN 5,) KIT 1 each continuous 1 kit 0    Insulin Disposable Pump (OMNIPOD 5 G6 POD, GEN 5,) MISC 1 each every 3 days 30 each 5    insulin glargine (LANTUS PEN) 100 UNIT/ML pen Inject 6 units daily in the event of pump failure 15 mL 0    insulin pen needle (32G X 4 MM) 32G X 4 MM miscellaneous Use 4-6 pen needles daily to inject subcutaneous insulin 200 each 11    levothyroxine (SYNTHROID/LEVOTHROID) 150 MCG tablet Take 1 tablet (150 mcg) by mouth daily 30 tablet 11    lipase-protease-amylase (VIOKACE) 19736-47898 units TABS tablet Take 5 with meals and 3 with snacks; approximately daily maximum of 20 capsules 500 tablet 11    loratadine (CLARITIN) 10 MG tablet Take 10 mg by mouth daily as needed       modafinil (PROVIGIL) 200 MG tablet Take 400 mg by mouth daily      omeprazole (PRILOSEC) 40 MG DR capsule Take 1 capsule (40 mg) by mouth 2 times daily Take 30-60 minutes before a meal. 180 capsule 3    traMADol (ULTRAM) 50 MG tablet       traZODone (DESYREL) 50 MG tablet Take 50 mg by mouth  nightly as needed for sleep      potassium citrate-citric acid (CYTRA K) 6233-0669 MG Packet Take 1 packet by mouth 4 times daily (before meals and nightly) 100 packet 11        ALLERGIES:    Allergies   Allergen Reactions    Corticosteroids Other (See Comments)     All oral, IV and injectable steroids are contraindicated (unless in life threatening situations) in Islet Auto transplant recipients. They can cause irreversible loss of islet cell function. Please contact patient's transplant care coordinator, Erlinda Multani RN BSN at [phone]/pager: [phone] and endocrinologist prior to administration.      Povidone Iodine Hives     Causes skin to blister    Nsaids      naprosyn = GI upset    Sulfasalazine Nausea and Nausea and Vomiting         PHYSICAL EXAM:   video visit  GENERAL APPEARANCE: alert and no distress  RESP: normal work of breathing, no conversational dyspnea  NEURO: mentation intact and speech normal    LABS:   I reviewed:  Electrolytes/Renal -   Recent Labs   Lab Test 04/03/23  1445 01/05/23  1342 10/31/22  0741 02/04/22  0737 02/04/22  0726 02/03/22  2130 02/03/22  2056 02/03/22  0741 02/03/22  0730    141 143   < >  --   --   --   --   --    POTASSIUM 4.3 4.2 3.3*   < > 4.2  --   --   --  3.9   CHLORIDE 110* 106 108*   < >  --   --   --   --   --    CO2 24 21* 25   < >  --   --   --   --   --    BUN 19.5 21.9 22.2   < >  --   --   --   --   --    CR 0.77 0.94 0.80   < >  --   --   --   --   --    GLC 87 111* 97   < >  --    < >  --    < >  --    VALARIE 9.7 9.0 9.2   < >  --   --   --   --   --    MAG  --  1.8  --   --  1.6  --   --   --  1.8   PHOS  --  4.2  --   --   --   --  2.7  --  1.8*    < > = values in this interval not displayed.       CBC -   Recent Labs   Lab Test 04/03/23  1445 03/24/23  1325 01/05/23  1342   WBC 8.4 9.4 7.8   HGB 11.7 12.4 11.7    526* 387       LFTs -   Recent Labs   Lab Test 04/03/23  1445 01/05/23  1342 10/31/22  0741   ALKPHOS 122* 111* 110*    BILITOTAL 0.2 0.2 0.2   ALT 30 49* 36*   AST 30 46* 28   PROTTOTAL 6.7 6.3* 5.9*   ALBUMIN 4.3 4.0 3.7       Coags -   Recent Labs   Lab Test 05/10/16  2019   INR 1.03       Iron Panel -   Recent Labs   Lab Test 03/24/23  1325 01/05/23  1342 10/17/22  1021   IRON 118 90 87   IRONSAT 38 29 32   GRISELDA 44 34 77       Endocrine -   Recent Labs   Lab Test 04/03/23  1445 03/24/23  1325 01/19/23  1304 01/05/23  1342 09/15/22  0948 09/03/21  1239 08/17/21  1131   A1C 6.6*  --  6.5*  --  7.7*   < >  --    TSH  --  0.50  --  6.56*  --   --  2.75    < > = values in this interval not displayed.       IMAGING:  reviewed     Hannah Murcia MD

## 2023-05-02 NOTE — NURSING NOTE
Is the patient currently in the state of [place]? YES    Visit mode:VIDEO    If the visit is dropped, the patient can be reconnected by: VIDEO VISIT: Text to cell phone: [phone]    Will anyone else be joining the visit? NO      How would you like to obtain your AVS? MyChart    Are changes needed to the allergy or medication list? NO    Reason for visit: No chief complaint on file.

## 2023-05-02 NOTE — PROGRESS NOTES
[facility]  05/02/2023     Lynn Thompson MRN:[id number] YOB: 1965  Primary care provider: Maryana Brooks  Requesting physician: Alonzo Rome     ASSESSMENT AND RECOMMENDATIONS:   Lynn Thompson is a 58 year old presenting for nephrolithiasis.  # Recurrent kidney stones: stone type primarily calcium oxalate though 2 analysis showed small component of calcium phosphate  - has had multiple operations and also very strong family history so likely combination of genetics along with enteric hyperoxaluria after gastric bypass and pancreatectomy    # enteric hyperoxaluria - improved but not controlled on calcium supplements (was not on calcium supplements initially in 2015 when urine collection showed urine oxalate >100 mg and urine calcium <100 mg)  - briefly discussed some high oxalate foods, keep potato portions small, avoid spinach    # acceptable renal function based on Oct 2022 studies  - iohexol GFR 66 ml/min/1.73 m2 (raw clearance 59 ml/min)  - cystatin C GFR 73  - creatinine based eGFR 80's so creatinine does overestimate renal function in Lynn    # history Quinn en Y gastric bypass  # history Crohns - s/p partial resection    At risk for making more kidney stones so will benefit from urine chemistry/supersaturation evaluation and monitoring and regular follow up for kidney stone prevention.    Other co-morbidities:  # history chronic pancreatitis  # total pancreatectomy with auto islet 2013 - follows with Dr. 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     Repeat 24 hour  chemistries in 2023  Repeat imaging per Dr. Rome  Return in about 3 months (around 2023).     60 minutes spent on visit, meeting with Lynn Thompson and in chart review and documentation on date of encounter, 23      Hannah Murcia MD  [facility]  Department of Medicine  Division of Renal Disease and Hypertension  Amcom  Vocera Web Console        REASON FOR CONSULT: nephrolithiasis    HISTORY OF PRESENT ILLNESS:  Lynn Thompson is a 58 year old with history Crohn's with ileal resection , gale en Y gastric bypass (), chronic pancreatitis s/p pancreatectomy and auto islet cell transplant () with feeding tube 4-5 months after, post pancreatectomy diabetes (), kidney stones who presents for follow up. Previously saw Dr. Solano, last visit in , for recurrent kidney stones with severe hyperoxaluria was managed with calcium carbonate suspension and potassium citrate.    Has history of intermittent SBO and in past dependent on liquid diet, has required lysis of adhesions in past multiple times.    Stone surgical history:   2022 Left URS, laser lithotripsy, stone basketing  2015 right URS and lithotripsy with stone basketing  3/23/15 right URS, lithotripsy  10/16/2012 left ESWL  3/2011 right stent for ureteral stone and obstruction  2011 right URS and stone extraction  3/5/2008 right and left ESWL    Last imagin2022 ultrasound - no stones mentioned on report    Stone Ruby:   Left: cleared 99% 2022  Right: 4 mm lower pole stone noted on CT 2022    Diet:  Cannot eat a whole lot in one sitting, grazes  Watches what she eats  Proteins - chicken, eggs, ground beef or ground turkey, fish  Calcium - yogurt, cheese, calcium citrate with vitamin D - 1 pill am and 1 pill pm  Some whole beans and grains  Fruit and vegetable - watches sugar content of fruit, berries, grapefruit, watermelon, pineapple, oranges  Broccoli, stir lovell,  chopped cabbage salad, carrots, spinach, potato    Never feels hungry, has to make herself eat, usually does not eat breakfast    Fluids - 1 cup coffee, 1 diet coke, water (plain), seldom herbal tea, alcohol less than 4 drinks per year    Has diarrhea/dumping with too much sugar, since rectal prolapse, has been more diligent with foods that impact GI system    Had recent spine surgery.     I reviewed 24 hour urine chemstries:            Family history is positive for kidney stones in several siblings, older brother has had hundreds, father and grandfather also had kidney stones    REVIEW OF SYSTEMS:  A 10 point review of systems was negative except as noted above.    Problem list  Patient Active Problem List   Diagnosis     Iron deficiency anemia secondary to inadequate dietary iron intake     Gastric bypass status for obesity     Health Care Home     Chronic pain syndrome     Exocrine pancreatic insufficiency     Asplenia     BLU (obstructive sleep apnea)     H/O of rectopexy     Dysthymia     Anxiety     Thyroid nodule     Acquired hypothyroidism     Post-pancreatectomy diabetes (H)     Other iron deficiency anemia     Uterovaginal prolapse, unspecified     Rectal prolapse     Small bowel obstruction (H)     Urge incontinence     Urinary urgency     High-tone pelvic floor dysfunction     Pyuria     Kidney stone on left side     History of uterine prolapse     Vaginal atrophy     PSH  Past Surgical History:   Procedure Laterality Date     ABDOMINOPLASTY  2002    Tummy tuck     APPENDECTOMY  1990     BUNIONECTOMY Right 1998     CBD Stent placement  2002    CBD stent; Dr. Presley      SECTION       CHOLECYSTECTOMY       COLONOSCOPY N/A 2021    Procedure: COLONOSCOPY INCOMPLETE Aborted due to incomplete prep  will need to take additional prep and return tomorrow 21;  Surgeon: Ihsan Saenz MD;  Location:  GI     COMBINED CYSTOSCOPY, RETROGRADES, URETEROSCOPY, LASER HOLMIUM  LITHOTRIPSY URETER(S), INSERT STENT Right 03/23/2015    Procedure: COMBINED CYSTOSCOPY, RETROGRADES, URETEROSCOPY, LASER HOLMIUM LITHOTRIPSY URETER(S), INSERT STENT;  Surgeon: Kennedi Aldana MD;  Location: [facility]     COMBINED CYSTOSCOPY, RETROGRADES, URETEROSCOPY, LASER HOLMIUM LITHOTRIPSY URETER(S), INSERT STENT Right 04/20/2015    Procedure: COMBINED CYSTOSCOPY, RETROGRADES, URETEROSCOPY, LASER HOLMIUM LITHOTRIPSY URETER(S), INSERT STENT;  Surgeon: Kennedi Aldana MD;  Location: [facility] OR     COSMETIC SURGERY  6/24/2002    Tummy tuck     CYSTECTOMY OVARIAN BENIGN Right      CYSTOSCOPY, RETROGRADES, INSERT STENT URETER(S), COMBINED  10/02/2012    Procedure: COMBINED CYSTOSCOPY, RETROGRADES, INSERT STENT URETER(S);  COMBINED CYSTOSCOPY,  , INSERT LEFT STENT URETER;  Surgeon: Johny Baez MD;  Location: [facility] OR     CYSTOSCOPY, RETROGRADES, INSERT STENT URETER(S), COMBINED Left 9/20/2022    Procedure: Cystoscopy with left retrograde pyelogram, left ureteroscopy, thulium laser lithotripsy of left ureteral calculus, stone basketing and left ureteral stent insertion;  Surgeon: Alonzo Rome MD;  Location: [facility] OR     ESOPHAGOSCOPY, GASTROSCOPY, DUODENOSCOPY (EGD), COMBINED N/A 01/24/2018    Procedure: COMBINED ESOPHAGOSCOPY, GASTROSCOPY, DUODENOSCOPY (EGD);  ESOPHAGOSCOPY, GASTROSCOPY, DUODENOSCOPY (EGD)    ;  Surgeon: Tamir Rodgers MD;  Location: [facility] GI     EXTRACORPOREAL SHOCK WAVE LITHOTRIPSY (ESWL)  10/16/2012    Procedure: EXTRACORPOREAL SHOCK WAVE LITHOTRIPSY (ESWL);  left EXTRACORPOREAL SHOCK WAVE LITHOTRIPSY (ESWL) ;  Surgeon: Johny Baez MD;  Location: [facility] OR     Gastric bypass NOS       HERNIA REPAIR  02/2015     HYSTERECTOMY SUPRACERVICAL, BILATERAL SALPINGO-OOPHORECTOMY, COMBINED N/A 02/01/2022    Procedure: Abdominal supracervical hysterectomy, bilateral salpingooophrectomy;  Surgeon: Nicole Rivera MD;  [facility]     IRRIGATION AND DEBRIDEMENT HAND, COMBINED Left  10/30/2020    Procedure: Left hand sharp excisional debridement of skin, subcutaneous tissue and fat with a scalpel, 2 x 1 x 1 cm.;  Surgeon: Demian Renteria MD;  Location: [facility]     LAPAROSCOPIC LYSIS ADHESIONS N/A 02/20/2015    Procedure: LAPAROSCOPIC LYSIS ADHESIONS;  Surgeon: Aaron Early MD;  [facility]     LAPAROSCOPIC LYSIS ADHESIONS N/A 12/29/2015    Procedure: LAPAROSCOPIC LYSIS ADHESIONS;  Surgeon: Aaron Early MD;  [facility]     PANCREATECTOMY, TRANSPLANT AUTO ISLET CELL, COMBINED  05/10/2013    Procedure: COMBINED PANCREATECTOMY, TRANSPLANT AUTO ISLET CELL;  Pancreatectomy, Auto Islet Cell Transplant   hernia repair, jejunostomy tube and liver biopsies with Anesthesia General with block;  Surgeon: Aaron Early MD;  [facility]     Partial ileum resection  1992     RECTOPEXY ABDOMINAL N/A 02/01/2022    Procedure: RECTOPEXY, ABDOMINAL;  Surgeon: Uriah Sheridan MD;  [facility]     REPAIR PTOSIS BROW BILATERAL Bilateral 06/09/2020    Procedure: BILATERAL BROW PTOSIS REPAIR;  Surgeon: Denise Alberts MD;  Location: [facility]     SACROCOLPOPEXY, CYSTOSCOPY, COMBINED N/A 02/01/2022    Procedure: Uterosacral ligament suspension, pina colposuspension with Cystoscopy;  Surgeon: Nicole Rivera MD;  [facility]     SIGMOIDOSCOPY FLEXIBLE N/A 02/01/2022    Procedure: SIGMOIDOSCOPY, FLEXIBLE;  Surgeon: Uriah Sheridan MD;  [facility]     Surgery for SBO  2015     TONSILLECTOMY, ADENOIDECTOMY, COMBINED  1997     TRANSPLANT  5/10/13    Pancreatic Auto-Islet Transplant       Social  Disability  Rescues dogs  Social History     Socioeconomic History     Marital status:      Spouse name: Tera     Number of children: 2     Years of education: Not on file     Highest education level: Some college, no degree   Occupational History     Occupation: customer service     Employer: [employer]   Tobacco Use     Smoking status: Never     Smokeless  tobacco: Never   Vaping Use     Vaping status: Never Used   Substance and Sexual Activity     Alcohol use: Not Currently     Drug use: Not Currently     Sexual activity: Yes     Partners: Male     Birth control/protection: Post-menopausal   Other Topics Concern     Parent/sibling w/ CABG, MI or angioplasty before 65F 55M? No   Social History Narrative     Not on file     Social Determinants of Health     Financial Resource Strain: Medium Risk (12/9/2022)    Overall Financial Resource Strain (CARDIA)      Difficulty of Paying Living Expenses: Somewhat hard   Food Insecurity: No Food Insecurity (12/9/2022)    Hunger Vital Sign      Worried About Running Out of Food in the Last Year: Never true      Ran Out of Food in the Last Year: Never true   Transportation Needs: No Transportation Needs (12/9/2022)    PRAPARE - Transportation      Lack of Transportation (Medical): No      Lack of Transportation (Non-Medical): No   Physical Activity: Insufficiently Active (12/9/2022)    Exercise Vital Sign      Days of Exercise per Week: 2 days      Minutes of Exercise per Session: 30 min   Stress: Stress Concern Present (12/9/2022)    [employer] - Occupational Stress Questionnaire      Feeling of Stress : To some extent   Social Connections: Socially Integrated (12/9/2022)    Social Connection and Isolation Panel [NHANES]      Frequency of Communication with Friends and Family: Twice a week      Frequency of Social Gatherings with Friends and Family: Once a week      Attends Yarsanism Services: More than 4 times per year      Active Member of Clubs or Organizations: Yes      Attends Club or Organization Meetings: Not on file      Marital Status:    Intimate Partner Violence: Not At Risk (9/9/2021)    Humiliation, Afraid, Rape, and Kick questionnaire      Fear of Current or Ex-Partner: No      Emotionally Abused: No      Physically Abused: No      Sexually Abused: No   Housing Stability: Low Risk   (2022)    Housing Stability Vital Sign      Unable to Pay for Housing in the Last Year: No      Number of Places Lived in the Last Year: 1      Unstable Housing in the Last Year: No     Family history  Family History   Problem Relation Age of Onset     Family History Negative Mother      Respiratory Father         COPD;  at 69     Genitourinary Problems Father         kidney stones     Substance Abuse Father      Depression Father      Asthma Father      Heart Disease Paternal Grandfather         M.I.     Coronary Artery Disease Paternal Grandfather      Hyperlipidemia Paternal Grandfather      Genitourinary Problems Brother         multiple brothers with kidney stones     Gastrointestinal Disease Maternal Grandmother         undiagnosed 'gut' issues     Coronary Artery Disease Maternal Grandfather      Hyperlipidemia Maternal Grandfather      Cerebrovascular Disease Paternal Grandmother         At the age of 103     Anxiety Disorder Paternal Grandmother      Osteoporosis Paternal Grandmother      Anxiety Disorder Son      Anxiety Disorder Daughter      Asthma Daughter      Colon Cancer No family hx of           MEDICATIONS:  Current Outpatient Medications   Medication Sig Dispense Refill     calcium carbonate 600 mg-vitamin D 400 units (CALTRATE) 600-400 MG-UNIT per tablet Take 1 tablet by mouth daily       Continuous Blood Gluc Sensor (DEXCOM G6 SENSOR) MISC 1 each every 10 days Change every 10 days. 3 each 5     Continuous Blood Gluc Transmit (DEXCOM G6 TRANSMITTER) MISC 1 each every 3 months Change every 3 months. 1 each 1     DULoxetine (CYMBALTA) 20 MG capsule Take 20 mg by mouth daily       escitalopram (LEXAPRO) 20 MG tablet Take 20 mg by mouth daily       estradiol (ESTRACE) 0.1 MG/GM vaginal cream Place vaginally twice a week PLACE 2 GRAMS VAGINALLY 2 TIMES WEEKLY 42.5 g 11     famotidine (PEPCID) 40 MG tablet Take 1 tablet (40 mg) by mouth 2 times daily as needed for heartburn 180 tablet 3      FIASP PENFILL 100 UNIT/ML SOCT Inject 0.5-4 Units Subcutaneous 4 times daily, INJECT with meals and nightly 15 mL 0     gabapentin (NEURONTIN) 300 MG capsule Take 300 mg by mouth nightly as needed       Glucagon (GVOKE HYPOPEN 1-PACK) 1 MG/0.2ML SOAJ Inject 1 mg Subcutaneous once as needed (for hypoglycemia with loss of consciousness) 15 mL 5     glucose 40 % GEL Take 15-30 g by mouth every 15 minutes as needed. 1 Tube 11     hydrOXYzine (ATARAX) 25 MG tablet TAKE 1-2 TABLETS BY MOUTH 2 TIMES DAILY AS NEEDED FOR ANXIETY  0     Insulin Aspart, w/Niacinamide, (FIASP) 100 UNIT/ML SOLN 15 Units by Subcutaneous Infusion route daily Up to 85 units in continuous insulin pump 90 mL 1     Insulin Disposable Pump (OMNIPOD 5 G6 INTRO, GEN 5,) KIT 1 each continuous 1 kit 0     Insulin Disposable Pump (OMNIPOD 5 G6 POD, GEN 5,) MISC 1 each every 3 days 30 each 5     insulin glargine (LANTUS PEN) 100 UNIT/ML pen Inject 6 units daily in the event of pump failure 15 mL 0     insulin pen needle (32G X 4 MM) 32G X 4 MM miscellaneous Use 4-6 pen needles daily to inject subcutaneous insulin 200 each 11     levothyroxine (SYNTHROID/LEVOTHROID) 150 MCG tablet Take 1 tablet (150 mcg) by mouth daily 30 tablet 11     lipase-protease-amylase (VIOKACE) 41856-69532 units TABS tablet Take 5 with meals and 3 with snacks; approximately daily maximum of 20 capsules 500 tablet 11     loratadine (CLARITIN) 10 MG tablet Take 10 mg by mouth daily as needed        modafinil (PROVIGIL) 200 MG tablet Take 400 mg by mouth daily       omeprazole (PRILOSEC) 40 MG DR capsule Take 1 capsule (40 mg) by mouth 2 times daily Take 30-60 minutes before a meal. 180 capsule 3     traMADol (ULTRAM) 50 MG tablet        traZODone (DESYREL) 50 MG tablet Take 50 mg by mouth nightly as needed for sleep       potassium citrate-citric acid (CYTRA K) 1435-5921 MG Packet Take 1 packet by mouth 4 times daily (before meals and nightly) 100 packet 11        ALLERGIES:     Allergies   Allergen Reactions     Corticosteroids Other (See Comments)     All oral, IV and injectable steroids are contraindicated (unless in life threatening situations) in Islet Auto transplant recipients. They can cause irreversible loss of islet cell function. Please contact patient's transplant care coordinator, Erlinda Multani RN BSN at [phone]/pager: [phone] and endocrinologist prior to administration.       Povidone Iodine Hives     Causes skin to blister     Nsaids      naprosyn = GI upset     Sulfasalazine Nausea and Nausea and Vomiting         PHYSICAL EXAM:   video visit  GENERAL APPEARANCE: alert and no distress  RESP: normal work of breathing, no conversational dyspnea  NEURO: mentation intact and speech normal    LABS:   I reviewed:  Electrolytes/Renal -   Recent Labs   Lab Test 04/03/23  1445 01/05/23  1342 10/31/22  0741 02/04/22  0737 02/04/22  0726 02/03/22  2130 02/03/22  2056 02/03/22  0741 02/03/22  0730    141 143   < >  --   --   --   --   --    POTASSIUM 4.3 4.2 3.3*   < > 4.2  --   --   --  3.9   CHLORIDE 110* 106 108*   < >  --   --   --   --   --    CO2 24 21* 25   < >  --   --   --   --   --    BUN 19.5 21.9 22.2   < >  --   --   --   --   --    CR 0.77 0.94 0.80   < >  --   --   --   --   --    GLC 87 111* 97   < >  --    < >  --    < >  --    VALARIE 9.7 9.0 9.2   < >  --   --   --   --   --    MAG  --  1.8  --   --  1.6  --   --   --  1.8   PHOS  --  4.2  --   --   --   --  2.7  --  1.8*    < > = values in this interval not displayed.       CBC -   Recent Labs   Lab Test 04/03/23  1445 03/24/23  1325 01/05/23  1342   WBC 8.4 9.4 7.8   HGB 11.7 12.4 11.7    526* 387       LFTs -   Recent Labs   Lab Test 04/03/23  1445 01/05/23  1342 10/31/22  0741   ALKPHOS 122* 111* 110*   BILITOTAL 0.2 0.2 0.2   ALT 30 49* 36*   AST 30 46* 28   PROTTOTAL 6.7 6.3* 5.9*   ALBUMIN 4.3 4.0 3.7       Coags -   Recent Labs   Lab Test 05/10/16  2019   INR 1.03       Iron Panel -    Recent Labs   Lab Test 03/24/23  1325 01/05/23  1342 10/17/22  1021   IRON 118 90 87   IRONSAT 38 29 32   GRISELDA 44 34 77       Endocrine -   Recent Labs   Lab Test 04/03/23  1445 03/24/23  1325 01/19/23  1304 01/05/23  1342 09/15/22  0948 09/03/21  1239 08/17/21  1131   A1C 6.6*  --  6.5*  --  7.7*   < >  --    TSH  --  0.50  --  6.56*  --   --  2.75    < > = values in this interval not displayed.       IMAGING:  reviewed     Hannah Murcia MD         Virtual Visit Details    Type of service:  Video Visit   Video Start Time: 4:04 pm  Video End Time:4:35 PM    Originating Location (pt. Location): Home  Distant Location (provider location):  Off-site  Platform used for Video Visit: Lizbeth

## 2023-05-02 NOTE — PATIENT INSTRUCTIONS
Start potassium citrate 10 meq  - 1 pill - twice a day with meals and with plenty of water    Let me know on mychart if you have any trouble with the potassium citrate    Complete litholink - 24 hour urine - July 2023  Return in about 3 months (around 8/2/2023).

## 2023-05-04 ENCOUNTER — VIRTUAL VISIT (OUTPATIENT)
Dept: TRANSPLANT | Facility: CLINIC | Age: 58
End: 2023-05-04
Attending: PEDIATRICS
Payer: COMMERCIAL

## 2023-05-04 DIAGNOSIS — E13.9 POST-PANCREATECTOMY DIABETES (H): Primary | ICD-10-CM

## 2023-05-04 DIAGNOSIS — E89.1 POST-PANCREATECTOMY DIABETES (H): Primary | ICD-10-CM

## 2023-05-04 DIAGNOSIS — Z90.410 POST-PANCREATECTOMY DIABETES (H): Primary | ICD-10-CM

## 2023-05-04 PROCEDURE — G0463 HOSPITAL OUTPT CLINIC VISIT: HCPCS | Mod: PN,GT | Performed by: PEDIATRICS

## 2023-05-04 PROCEDURE — 99215 OFFICE O/P EST HI 40 MIN: CPT | Mod: VID | Performed by: PEDIATRICS

## 2023-05-04 NOTE — PATIENT INSTRUCTIONS
1)  Let's go to 20 grams on your carb settings since you are running high after meals, especially with higher carb meals.    2)  Let me know if you are still seeing a lot of highs, or start to have lows.      Follow Up Sept 7   -- with me at 2:20pm.   -- Come in before this for fasting lab draw and boost test so will need lab around 10am, then nursing visit for Boost.

## 2023-05-04 NOTE — PROGRESS NOTES
Lynn is a 58 year old who is being evaluated via a billable video visit.        Video-Visit Details    Type of service:  Video Visit   Video Start Time: 2:30  Video End Time:2:52    Originating Location (pt. Location): Home    Distant Location (provider location):  On-site  Platform used for Video Visit: [facility]  Islet Autotransplant, Diabetes Follow Up    Problem List:  Patient Active Problem List   Diagnosis     Iron deficiency anemia secondary to inadequate dietary iron intake     Gastric bypass status for obesity     Health Care Home     Chronic pain syndrome     Exocrine pancreatic insufficiency     Asplenia     BLU (obstructive sleep apnea)     H/O of rectopexy     Dysthymia     Anxiety     Thyroid nodule     Acquired hypothyroidism     Post-pancreatectomy diabetes (H)     Other iron deficiency anemia     Uterovaginal prolapse, unspecified     Rectal prolapse     Small bowel obstruction (H)     Urge incontinence     Urinary urgency     High-tone pelvic floor dysfunction     Pyuria     Recurrent kidney stones     History of uterine prolapse     Vaginal atrophy     Enteric hyperoxaluria     Hypocitraturia       HPI:  Lynn is a 58 year old female here for follow up o total pancreatectomy, islet cell autotransplant, splenectomy, liver biopsy (needle core), choledochoduodenostomy, feeding jejunostomy performed on 5/10/2013.  At the time of the procedure, the patient received 178,100 IEQ, or 3,209 IEQ/kg body weight. She has had two subsequent exploratory laparatomy for small bowel obstruction. Other notable endocrine history includes a complex cystic nodule in mid right thyroid lobe with 1 cm maximum diameter (saw Dr Adorno in 11/2016) and mild hypothyroidism followed by PCP, pathology in 2018 by FNA showed a benign nodule (includes adenomatoid nodule, colloid nodule, etc.).  She had an episode of cholangitis and was hospitalized for 4 days 8/24/17 (fevers,  RUQ pain, + Ecoli blood cx).    She was on NO insulin at her 1 year, 2 year, 3 year, 4 year transplant anniversaries and restart insulin just after 4 years post-tx, insulin restart date of  6/6/2017.   She had a slow opioid wean off suboxone but eventually did come off.     Other history notable for surgical procedure Admitted from 2/1- 2/5/22 for  1. Posterior suture rectopexy (Colorectal primary)  2. Sigmoidoscopy (Colorectal primary)  3. Supracervical hysterectomy with bilateral salpingooophrectomy (Uro)  4. Uterosacral ligament suspension (Uro)  5. Gan colposuspension (Uro)  6. Cystoscopy (Uro)        1)  Diabetes:  Lynn continues to have partial islet graft function.   She is on Fiasp right before meal time and has rapid gastric emptying.    Continues on Omnipod 5 and is very happy with this.   It has been a game changer for her, especially with lows.  Since I last saw her, interval history complicated by cervical spine fusion and this has probably impacted numbers which are higher post meal right now.  CGM and pump sites OK, no issues.  Remains busy with the dog rescue.        Pump, settings below.    Max Basal 1.0 unit(s)/hr     Basal Rates:  12a 0.15 unit(s)/hr  8a 0.25 units/hr      Target Glucose  12a-8a 130 Correct above 140  8a-12 a 120 Correct above 130     Sensitivity   12a -8a 170  8am-12a 140     Insulin to Carb Ratio  12 am 28 grams     Max Bolus 6  Active insulin time 3 hours  Reverse correction off    Total daily insulin dose=           Total daily insulin dose of 11.7 units (5.1 units per day basal)    Lab Results   Component Value Date    A1C 6.6 04/03/2023    A1C 6.5 01/19/2023    A1C 7.7 09/15/2022    A1C 8.2 05/08/2022    A1C 6.8 01/18/2022    A1C 6.8 05/10/2021    A1C 6.9 02/16/2021    A1C 6.7 08/20/2020    A1C 7.1 06/05/2020    A1C 6.9 02/06/2020           Hypoglycemia history:   Recent history as above.  The patient has had NO episodes of severe hypoglycemia (seizure, coma, or  neuroglycopenic symptoms severe enough to require assistance from another person).  Blood sugars were reviewed from the patient records and/or the meter download.      Uses Dexcom G6-Better after pump start.   Decreased TIR, likely surgery related, mostly post meal, but reassuringly with little time below 70.    38% TIR           Patient is good candidate for CGM therapy.  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   --  Require frequent adjustments of the insulin treatment regimen, based on therapeutic CGM test results      2)  Nutrition:  Weight loss has been an issue historically.  Not assessed today as we do not have a weight.     Last nutritional studies:  Component      Latest Ref Rng & Units 9/6/2022 9/15/2022   Iron      37 - 145 ug/dL 69    Iron Sat Index      15 - 46 % 27    Iron Binding Capacity      240 - 430 ug/dL 255    Vitamin A      0.30 - 1.20 mg/L  0.49   Retinol Palmitate      0.00 - 0.10 mg/L  <0.02   Vitamin A Interp        Normal   25 OH Vit D2      ug/L  <5   25 OH Vit D3      ug/L  58   25 OH Vit D total      20 - 75 ug/L  <63   Vitamin E      5.5 - 18.0 mg/L  10.3   Vitamin E Gamma      0.0 - 6.0 mg/L  0.9   Vitamin B12      232 - 1,245 pg/mL  418       3)  Thyroid:  On levothyroxine 150 mcg daily, increased around time of last visit.  Most recent labs nl.  TSH   Date Value Ref Range Status   03/24/2023 0.50 0.30 - 4.20 uIU/mL Final   08/17/2021 2.75 0.40 - 4.00 mU/L Final   03/15/2021 2.93 0.40 - 4.00 mU/L Final     T4 Free   Date Value Ref Range Status   06/05/2020 1.05 0.76 - 1.46 ng/dL  Final     Free T4   Date Value Ref Range Status   2023 1.26 0.90 - 1.70 ng/dL Final           Review of systems:  A complete Review Of Systems was performed but was negative except as noted in the HPI.    Past Medical and Surgical History:  Past Medical History:   Diagnosis Date     Benign paroxysmal positional vertigo     occ.      Calculus of kidney 05/2005    x1 on L side passed, several stones.  Has been tested for oxalate.     Chronic abdominal pain 2013     Chronic pancreatitis 2013     Depression     also occ panic spells     Diabetes (H) 5/10/2013    Total Pancreatomy with Auto Islets Transplant     Dyspepsia 1999    H. pylori   treated     Headaches     still periodic HA's ;  often 5X/week     Hypertension 2016    Stress related     Iron deficiency anemia 2003    relates to gastric bypass     Post-pancreatectomy diabetes melltius 2013     Sleep apnea     uses splint     Spasm of sphincter of Oddi     surgical + endoscopic stenting of pancreatic duct [place] [facility] [place]     Thyroid nodule 2016     Past Surgical History:   Procedure Laterality Date     ABDOMINOPLASTY  2002    Tummy tuck     APPENDECTOMY  1990     BUNIONECTOMY Right 1998     CBD Stent placement  2002    CBD stent; Dr. Presley      SECTION       CHOLECYSTECTOMY       COLONOSCOPY N/A 2021    Procedure: COLONOSCOPY INCOMPLETE Aborted due to incomplete prep  will need to take additional prep and return tomorrow 21;  Surgeon: Ihsan Saenz MD;  Location: [facility]     COMBINED CYSTOSCOPY, RETROGRADES, URETEROSCOPY, LASER HOLMIUM LITHOTRIPSY URETER(S), INSERT STENT Right 2015    Procedure: COMBINED CYSTOSCOPY, RETROGRADES, URETEROSCOPY, LASER HOLMIUM LITHOTRIPSY URETER(S), INSERT STENT;  Surgeon: Kennedi Aldana MD;  Location: [facility]     COMBINED CYSTOSCOPY, RETROGRADES, URETEROSCOPY, LASER HOLMIUM LITHOTRIPSY URETER(S), INSERT STENT Right 2015    Procedure:  COMBINED CYSTOSCOPY, RETROGRADES, URETEROSCOPY, LASER HOLMIUM LITHOTRIPSY URETER(S), INSERT STENT;  Surgeon: Kennedi Aldana MD;  Location: [facility]     COSMETIC SURGERY  6/24/2002    Tummy tuck     CYSTECTOMY OVARIAN BENIGN Right      CYSTOSCOPY, RETROGRADES, INSERT STENT URETER(S), COMBINED  10/02/2012    Procedure: COMBINED CYSTOSCOPY, RETROGRADES, INSERT STENT URETER(S);  COMBINED CYSTOSCOPY,  , INSERT LEFT STENT URETER;  Surgeon: Johny Baez MD;  Location: [facility]     CYSTOSCOPY, RETROGRADES, INSERT STENT URETER(S), COMBINED Left 9/20/2022    Procedure: Cystoscopy with left retrograde pyelogram, left ureteroscopy, thulium laser lithotripsy of left ureteral calculus, stone basketing and left ureteral stent insertion;  Surgeon: Alonzo Rome MD;  Location: [facility]     ESOPHAGOSCOPY, GASTROSCOPY, DUODENOSCOPY (EGD), COMBINED N/A 01/24/2018    Procedure: COMBINED ESOPHAGOSCOPY, GASTROSCOPY, DUODENOSCOPY (EGD);  ESOPHAGOSCOPY, GASTROSCOPY, DUODENOSCOPY (EGD)    ;  Surgeon: Tamir Rodgers MD;  Location: [facility]     EXTRACORPOREAL SHOCK WAVE LITHOTRIPSY (ESWL)  10/16/2012    Procedure: EXTRACORPOREAL SHOCK WAVE LITHOTRIPSY (ESWL);  left EXTRACORPOREAL SHOCK WAVE LITHOTRIPSY (ESWL) ;  Surgeon: Johny Baez MD;  Location: [facility]     Gastric bypass NOS       HERNIA REPAIR  02/2015     HYSTERECTOMY SUPRACERVICAL, BILATERAL SALPINGO-OOPHORECTOMY, COMBINED N/A 02/01/2022    Procedure: Abdominal supracervical hysterectomy, bilateral salpingooophrectomy;  Surgeon: Nicole Rivera MD;  [facility]     IRRIGATION AND DEBRIDEMENT HAND, COMBINED Left 10/30/2020    Procedure: Left hand sharp excisional debridement of skin, subcutaneous tissue and fat with a scalpel, 2 x 1 x 1 cm.;  Surgeon: Demian Renteria MD;  Location: [facility]     LAPAROSCOPIC LYSIS ADHESIONS N/A 02/20/2015    Procedure: LAPAROSCOPIC LYSIS ADHESIONS;  Surgeon: Aaron Early MD;  [facility]     LAPAROSCOPIC LYSIS ADHESIONS  N/A 2015    Procedure: LAPAROSCOPIC LYSIS ADHESIONS;  Surgeon: Aaron Early MD;  [facility]     PANCREATECTOMY, TRANSPLANT AUTO ISLET CELL, COMBINED  05/10/2013    Procedure: COMBINED PANCREATECTOMY, TRANSPLANT AUTO ISLET CELL;  Pancreatectomy, Auto Islet Cell Transplant   hernia repair, jejunostomy tube and liver biopsies with Anesthesia General with block;  Surgeon: Aaron Early MD;  [facility]     Partial ileum resection       RECTOPEXY ABDOMINAL N/A 2022    Procedure: RECTOPEXY, ABDOMINAL;  Surgeon: Uriah Sheridan MD;  [facility]     REPAIR PTOSIS BROW BILATERAL Bilateral 2020    Procedure: BILATERAL BROW PTOSIS REPAIR;  Surgeon: Denise Alberts MD;  Location: [facility]     SACROCOLPOPEXY, CYSTOSCOPY, COMBINED N/A 2022    Procedure: Uterosacral ligament suspension, pina colposuspension with Cystoscopy;  Surgeon: Nicole Rivera MD;  [facility]     SIGMOIDOSCOPY FLEXIBLE N/A 2022    Procedure: SIGMOIDOSCOPY, FLEXIBLE;  Surgeon: Uriah Sheridan MD;  [facility]     Surgery for SBO       TONSILLECTOMY, ADENOIDECTOMY, COMBINED  1997     TRANSPLANT  5/10/13    Pancreatic Auto-Islet Transplant       Family History:  New changes since last visit:  none  Family History   Problem Relation Age of Onset     Family History Negative Mother      Respiratory Father         COPD;  at 69     Genitourinary Problems Father         kidney stones     Substance Abuse Father      Depression Father      Asthma Father      Heart Disease Paternal Grandfather         M.I.     Coronary Artery Disease Paternal Grandfather      Hyperlipidemia Paternal Grandfather      Genitourinary Problems Brother         multiple brothers with kidney stones     Gastrointestinal Disease Maternal Grandmother         undiagnosed 'gut' issues     Coronary Artery Disease Maternal Grandfather      Hyperlipidemia Maternal Grandfather      Cerebrovascular  Disease Paternal Grandmother         At the age of 103     Anxiety Disorder Paternal Grandmother      Osteoporosis Paternal Grandmother      Anxiety Disorder Son      Anxiety Disorder Daughter      Asthma Daughter      Colon Cancer No family hx of        Social History:  Social History     Social History Narrative     Not on file     Does not work currently.  Mom to many foster dogs     Physical Exam:  Vitals: LMP 12/19/2013   BMI= There is no height or weight on file to calculate BMI.  General:  Appearance is normal, no acute distress  HEENT:  NC/AT, sclera appear white  Neck:  No thyroid nodule palpable.   Neck:  No obvious thyromegaly  CV/Lungs:  Non distressed breathing  Skin:  No apparent rashes.   Neuro:  Normal mental status  Psych:  Normal affect    Results  Mixed Meal Test:  C Peptide   Date Value Ref Range Status   09/15/2022 1.6 0.9 - 6.9 ng/mL Final   09/15/2022 1.2 0.9 - 6.9 ng/mL Final   09/15/2022 0.3 (L) 0.9 - 6.9 ng/mL Final   10/07/2021 1.4 0.9 - 6.9 ng/mL Final   10/07/2021 1.2 0.9 - 6.9 ng/mL Final   08/20/2020 3.4 0.9 - 6.9 ng/mL Final   08/20/2020 1.5 0.9 - 6.9 ng/mL Final   08/20/2020 0.5 (L) 0.9 - 6.9 ng/mL Final   05/16/2019 1.8 0.9 - 6.9 ng/mL Final   05/16/2019 1.5 0.9 - 6.9 ng/mL Final     Glucose   Date Value Ref Range Status   04/03/2023 87 70 - 99 mg/dL Final   01/05/2023 111 (H) 70 - 99 mg/dL Final   10/31/2022 97 70 - 99 mg/dL Final   09/23/2022 171 (H) 70 - 99 mg/dL Final   06/14/2022 104 (H) 70 - 99 mg/dL Final   04/16/2022 241 (H) 70 - 99 mg/dL Final   02/01/2022 90 70 - 99 mg/dL Final   01/18/2022 123 (H) 70 - 99 mg/dL Final   11/26/2021 139 (H) 70 - 99 mg/dL Final   05/10/2021 169 (H) 70 - 99 mg/dL Final   03/01/2021 141 (H) 70 - 99 mg/dL Final   02/28/2021 120 (H) 70 - 99 mg/dL Final   02/16/2021 106 (H) 70 - 99 mg/dL Final   10/30/2020 126 (H) 70 - 99 mg/dL Final     GLUCOSE BY METER POCT   Date Value Ref Range Status   09/23/2022 216 (H) 70 - 99 mg/dL Final   09/23/2022 157  (H) 70 - 99 mg/dL Final   09/23/2022 192 (H) 70 - 99 mg/dL Final   09/23/2022 71 70 - 99 mg/dL Final   09/22/2022 303 (H) 70 - 99 mg/dL Final       Hemoglobin A1c  Lab Results   Component Value Date    A1C 7.7 09/15/2022    A1C 8.2 05/08/2022    A1C 6.8 01/18/2022    A1C 6.5 10/07/2021    A1C 7.0 09/03/2021    A1C 6.8 05/10/2021    A1C 6.9 02/16/2021    A1C 6.7 08/20/2020    A1C 7.1 06/05/2020    A1C 6.9 02/06/2020       Liver enzymes  Lab Results   Component Value Date    AST 46 01/05/2023    AST 24 05/10/2021     Lab Results   Component Value Date    ALT 49 01/05/2023    ALT 35 05/10/2021     Lab Results   Component Value Date    BILICONJ 0.0 05/12/2014      Lab Results   Component Value Date    BILITOTAL 0.2 01/05/2023    BILITOTAL 0.3 05/10/2021     Lab Results   Component Value Date    ALBUMIN 4.0 01/05/2023    ALBUMIN 3.2 04/16/2022    ALBUMIN 3.4 05/10/2021     Lab Results   Component Value Date    PROTTOTAL 6.3 01/05/2023    PROTTOTAL 6.9 05/10/2021      Lab Results   Component Value Date    ALKPHOS 111 01/05/2023    ALKPHOS 132 05/10/2021       Creatinine:  Creatinine   Date Value Ref Range Status   04/03/2023 0.77 0.51 - 0.95 mg/dL Final   05/10/2021 0.81 0.52 - 1.04 mg/dL Final   ]    Complete Blood Count:  CBC RESULTS:   Recent Labs   Lab Test 01/05/23  1342   WBC 7.8   RBC 3.90   HGB 11.7   HCT 36.2   MCV 93   MCH 30.0   MCHC 32.3   RDW 14.7          Cholesterol  Lab Results   Component Value Date    CHOL 230 01/05/2023    CHOL 192 09/17/2020     Lab Results   Component Value Date    HDL 97 01/05/2023    HDL 91 09/17/2020     Lab Results   Component Value Date     01/05/2023    LDL 84 09/17/2020     Lab Results   Component Value Date    TRIG 89 01/05/2023    TRIG 83 09/17/2020     Lab Results   Component Value Date    CHOLHDLRATIO 1.9 11/20/2014       TSH   Date Value Ref Range Status   03/24/2023 0.50 0.30 - 4.20 uIU/mL Final   08/17/2021 2.75 0.40 - 4.00 mU/L Final   03/15/2021 2.93 0.40  - 4.00 mU/L Final     T4 Free   Date Value Ref Range Status   06/05/2020 1.05 0.76 - 1.46 ng/dL Final   01/22/2019 0.90 0.76 - 1.46 ng/dL Final   01/04/2018 0.72 (L) 0.76 - 1.46 ng/dL Final   03/06/2017 0.95 0.76 - 1.46 ng/dL Final   01/30/2017 0.78 0.76 - 1.46 ng/dL Final     Free T4   Date Value Ref Range Status   03/24/2023 1.26 0.90 - 1.70 ng/dL Final   01/05/2023 0.71 (L) 0.90 - 1.70 ng/dL Final   08/17/2021 1.06 0.76 - 1.46 ng/dL Final           Assessment:  1.  Post pancreatectomy diabetes mellitus, s/p total pancreatectomy and islet autotransplant.    Lynn is a 58 year old with history of chronic pancreatitis who is s/p total pancreatectomy and islet autotransplant.  She was insulin independent until 6/6/2017 (just after 4 years post-tx) and now has partial islet function.    She is on Fiasp due to rapid gastric emptying and post meal hypoglycemia.      She has started on the Omnipod 5.  She clearly has improved TIR since starting the pump with much less hypoglycemia.  Major issue today is post meal hyperglycemia, which I suspect is related to physiologic stress with recent surgery and recovery.  Changes made as below.       Plan:  1.  Changes to current diabetes regimen:  Patient Instructions     1)  Let's go to 20 grams on your carb settings since you are running high after meals, especially with higher carb meals.    2)  Let me know if you are still seeing a lot of highs, or start to have lows.      Follow Up Sept 7   -- with me at 2:20pm.   -- Come in before this for fasting lab draw and boost test so will need lab around 10am, then nursing visit for Boost.        2.  Frequency of blood sugar checks:  premeal and bedtime, and as needed for hypoglycemia (6 strip per day).    3.  Continue routine follow up for autoislet transplant patients:  Mixed meal test (6 mL/kg BoostHP to max of 360 mL) at 3 months, 6 months, and once a year post transplant.  Hemoglobin A1c levels at these time points and  quarterly.    4.  Other issues addressed today:  As above    Follow up:  As above        Contact me for questions at [phone] or [phone].  Emergency number to reach pediatric endocrinology after hours is [phone].    Dr. Adriana Robles MD  [facility]  Director of Research, Islet Auto-transplant Program  Phone:  [phone]  Electronically signed: May 7, 2023 at 7:52 AM      Review of the result(s) of each unique test - as indicated above  Prescription drug management  40 minutes spent on the date of the encounter doing chart review, history and exam, documentation and further activities per the note

## 2023-05-04 NOTE — LETTER
5/4/2023         RE: Lynn Thompson  [id number] [facility] [id number]        Dear Colleague,    Thank you for referring your patient, Lynn Thompson, to the [facility]. Please see a copy of my visit note below.    Lynn is a 58 year old who is being evaluated via a billable video visit.        Video-Visit Details    Type of service:  Video Visit   Video Start Time: 2:30  Video End Time:2:52    Originating Location (pt. Location): Home    Distant Location (provider location):  On-site  Platform used for Video Visit: [facility]  Islet Autotransplant, Diabetes Follow Up    Problem List:  Patient Active Problem List   Diagnosis    Iron deficiency anemia secondary to inadequate dietary iron intake    Gastric bypass status for obesity    Health Care Home    Chronic pain syndrome    Exocrine pancreatic insufficiency    Asplenia    BLU (obstructive sleep apnea)    H/O of rectopexy    Dysthymia    Anxiety    Thyroid nodule    Acquired hypothyroidism    Post-pancreatectomy diabetes (H)    Other iron deficiency anemia    Uterovaginal prolapse, unspecified    Rectal prolapse    Small bowel obstruction (H)    Urge incontinence    Urinary urgency    High-tone pelvic floor dysfunction    Pyuria    Recurrent kidney stones    History of uterine prolapse    Vaginal atrophy    Enteric hyperoxaluria    Hypocitraturia       HPI:  Lynn is a 58 year old female here for follow up o total pancreatectomy, islet cell autotransplant, splenectomy, liver biopsy (needle core), choledochoduodenostomy, feeding jejunostomy performed on 5/10/2013.  At the time of the procedure, the patient received 178,100 IEQ, or 3,209 IEQ/kg body weight. She has had two subsequent exploratory laparatomy for small bowel obstruction. Other notable endocrine history includes a complex cystic nodule in mid right thyroid lobe with 1 cm maximum diameter (saw Dr Adorno in  11/2016) and mild hypothyroidism followed by PCP, pathology in 2018 by FNA showed a benign nodule (includes adenomatoid nodule, colloid nodule, etc.).  She had an episode of cholangitis and was hospitalized for 4 days 8/24/17 (fevers, RUQ pain, + Ecoli blood cx).    She was on NO insulin at her 1 year, 2 year, 3 year, 4 year transplant anniversaries and restart insulin just after 4 years post-tx, insulin restart date of  6/6/2017.   She had a slow opioid wean off suboxone but eventually did come off.     Other history notable for surgical procedure Admitted from 2/1- 2/5/22 for  1. Posterior suture rectopexy (Colorectal primary)  2. Sigmoidoscopy (Colorectal primary)  3. Supracervical hysterectomy with bilateral salpingooophrectomy (Uro)  4. Uterosacral ligament suspension (Uro)  5. Gan colposuspension (Uro)  6. Cystoscopy (Uro)        1)  Diabetes:  Lynn continues to have partial islet graft function.   She is on Fiasp right before meal time and has rapid gastric emptying.    Continues on Omnipod 5 and is very happy with this.   It has been a game changer for her, especially with lows.  Since I last saw her, interval history complicated by cervical spine fusion and this has probably impacted numbers which are higher post meal right now.  CGM and pump sites OK, no issues.  Remains busy with the dog rescue.        Pump, settings below.    Max Basal 1.0 unit(s)/hr     Basal Rates:  12a 0.15 unit(s)/hr  8a 0.25 units/hr      Target Glucose  12a-8a 130 Correct above 140  8a-12 a 120 Correct above 130     Sensitivity   12a -8a 170  8am-12a 140     Insulin to Carb Ratio  12 am 28 grams     Max Bolus 6  Active insulin time 3 hours  Reverse correction off    Total daily insulin dose=           Total daily insulin dose of 11.7 units (5.1 units per day basal)    Lab Results   Component Value Date    A1C 6.6 04/03/2023    A1C 6.5 01/19/2023    A1C 7.7 09/15/2022    A1C 8.2 05/08/2022    A1C 6.8 01/18/2022    A1C 6.8  05/10/2021    A1C 6.9 02/16/2021    A1C 6.7 08/20/2020    A1C 7.1 06/05/2020    A1C 6.9 02/06/2020           Hypoglycemia history:   Recent history as above.  The patient has had NO episodes of severe hypoglycemia (seizure, coma, or neuroglycopenic symptoms severe enough to require assistance from another person).  Blood sugars were reviewed from the patient records and/or the meter download.      Uses Dexcom G6-Better after pump start.   Decreased TIR, likely surgery related, mostly post meal, but reassuringly with little time below 70.    38% TIR           Patient is good candidate for CGM therapy.  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   --  Require frequent adjustments of the insulin treatment regimen, based on therapeutic CGM test results      2)  Nutrition:  Weight loss has been an issue historically.  Not assessed today as we do not have a weight.     Last nutritional studies:  Component      Latest Ref Rng & Units 9/6/2022 9/15/2022   Iron      37 - 145 ug/dL 69    Iron Sat Index      15 - 46 % 27    Iron Binding Capacity      240 - 430 ug/dL 255    Vitamin A      0.30 - 1.20 mg/L  0.49   Retinol Palmitate      0.00 - 0.10 mg/L  <0.02   Vitamin A Interp        Normal   25 OH Vit D2      ug/L  <5   25 OH Vit D3      ug/L  58   25 OH Vit D total      20 - 75 ug/L  <63   Vitamin E      5.5 - 18.0 mg/L  10.3   Vitamin E Gamma      0.0 - 6.0 mg/L  0.9   Vitamin B12      232 - 1,245 pg/mL  418       3)  Thyroid:  On levothyroxine 150 mcg daily, increased around time of last visit.  Most recent labs nl.  TSH    Date Value Ref Range Status   2023 0.50 0.30 - 4.20 uIU/mL Final   2021 2.75 0.40 - 4.00 mU/L Final   03/15/2021 2.93 0.40 - 4.00 mU/L Final     T4 Free   Date Value Ref Range Status   2020 1.05 0.76 - 1.46 ng/dL Final     Free T4   Date Value Ref Range Status   2023 1.26 0.90 - 1.70 ng/dL Final           Review of systems:  A complete Review Of Systems was performed but was negative except as noted in the HPI.    Past Medical and Surgical History:  Past Medical History:   Diagnosis Date    Benign paroxysmal positional vertigo     occ.     Calculus of kidney 05/2005    x1 on L side passed, several stones.  Has been tested for oxalate.    Chronic abdominal pain 2013    Chronic pancreatitis 2013    Depression     also occ panic spells    Diabetes (H) 5/10/2013    Total Pancreatomy with Auto Islets Transplant    Dyspepsia 1999    H. pylori   treated    Headaches     still periodic HA's ;  often 5X/week    Hypertension 2016    Stress related    Iron deficiency anemia 2003    relates to gastric bypass    Post-pancreatectomy diabetes melltius 2013    Sleep apnea     uses splint    Spasm of sphincter of Oddi     surgical + endoscopic stenting of pancreatic duct [place] [facility] [place]    Thyroid nodule 2016     Past Surgical History:   Procedure Laterality Date    ABDOMINOPLASTY  2002    Tummy tuck    APPENDECTOMY  1990    BUNIONECTOMY Right 1998    CBD Stent placement  2002    CBD stent; Dr. Presley     SECTION      CHOLECYSTECTOMY      COLONOSCOPY N/A 2021    Procedure: COLONOSCOPY INCOMPLETE Aborted due to incomplete prep  will need to take additional prep and return tomorrow 21;  Surgeon: Ihsan Saenz MD;  Location: [facility]    COMBINED CYSTOSCOPY, RETROGRADES, URETEROSCOPY, LASER HOLMIUM LITHOTRIPSY URETER(S), INSERT STENT Right 2015    Procedure: COMBINED CYSTOSCOPY, RETROGRADES, URETEROSCOPY, LASER HOLMIUM LITHOTRIPSY  URETER(S), INSERT STENT;  Surgeon: Kennedi Aldana MD;  Location: [facility]    COMBINED CYSTOSCOPY, RETROGRADES, URETEROSCOPY, LASER HOLMIUM LITHOTRIPSY URETER(S), INSERT STENT Right 04/20/2015    Procedure: COMBINED CYSTOSCOPY, RETROGRADES, URETEROSCOPY, LASER HOLMIUM LITHOTRIPSY URETER(S), INSERT STENT;  Surgeon: Kennedi Aldana MD;  Location: [facility]    COSMETIC SURGERY  6/24/2002    Tummy tuck    CYSTECTOMY OVARIAN BENIGN Right     CYSTOSCOPY, RETROGRADES, INSERT STENT URETER(S), COMBINED  10/02/2012    Procedure: COMBINED CYSTOSCOPY, RETROGRADES, INSERT STENT URETER(S);  COMBINED CYSTOSCOPY,  , INSERT LEFT STENT URETER;  Surgeon: Johny Baez MD;  Location: [facility]    CYSTOSCOPY, RETROGRADES, INSERT STENT URETER(S), COMBINED Left 9/20/2022    Procedure: Cystoscopy with left retrograde pyelogram, left ureteroscopy, thulium laser lithotripsy of left ureteral calculus, stone basketing and left ureteral stent insertion;  Surgeon: Alonzo Rome MD;  Location: [facility]    ESOPHAGOSCOPY, GASTROSCOPY, DUODENOSCOPY (EGD), COMBINED N/A 01/24/2018    Procedure: COMBINED ESOPHAGOSCOPY, GASTROSCOPY, DUODENOSCOPY (EGD);  ESOPHAGOSCOPY, GASTROSCOPY, DUODENOSCOPY (EGD)    ;  Surgeon: Tamir Rodgers MD;  Location: [facility]    EXTRACORPOREAL SHOCK WAVE LITHOTRIPSY (ESWL)  10/16/2012    Procedure: EXTRACORPOREAL SHOCK WAVE LITHOTRIPSY (ESWL);  left EXTRACORPOREAL SHOCK WAVE LITHOTRIPSY (ESWL) ;  Surgeon: Johny Baez MD;  Location: [facility]    Gastric bypass NOS      HERNIA REPAIR  02/2015    HYSTERECTOMY SUPRACERVICAL, BILATERAL SALPINGO-OOPHORECTOMY, COMBINED N/A 02/01/2022    Procedure: Abdominal supracervical hysterectomy, bilateral salpingooophrectomy;  Surgeon: Nicole Rivera MD;  [facility]    IRRIGATION AND DEBRIDEMENT HAND, COMBINED Left 10/30/2020    Procedure: Left hand sharp excisional debridement of skin, subcutaneous tissue and fat with a scalpel, 2 x 1 x 1 cm.;  Surgeon:  Demian Renteria MD;  Location: [facility]    LAPAROSCOPIC LYSIS ADHESIONS N/A 2015    Procedure: LAPAROSCOPIC LYSIS ADHESIONS;  Surgeon: Aaron Early MD;  [facility]    LAPAROSCOPIC LYSIS ADHESIONS N/A 2015    Procedure: LAPAROSCOPIC LYSIS ADHESIONS;  Surgeon: Aaron Early MD;  [facility]    PANCREATECTOMY, TRANSPLANT AUTO ISLET CELL, COMBINED  05/10/2013    Procedure: COMBINED PANCREATECTOMY, TRANSPLANT AUTO ISLET CELL;  Pancreatectomy, Auto Islet Cell Transplant   hernia repair, jejunostomy tube and liver biopsies with Anesthesia General with block;  Surgeon: Aaron Early MD;  [facility]    Partial ileum resection      RECTOPEXY ABDOMINAL N/A 2022    Procedure: RECTOPEXY, ABDOMINAL;  Surgeon: Uriah Sheridan MD;  [facility]    REPAIR PTOSIS BROW BILATERAL Bilateral 2020    Procedure: BILATERAL BROW PTOSIS REPAIR;  Surgeon: Denise Alberts MD;  Location: [facility]    SACROCOLPOPEXY, CYSTOSCOPY, COMBINED N/A 2022    Procedure: Uterosacral ligament suspension, pina colposuspension with Cystoscopy;  Surgeon: Nicole Rivera MD;  [facility]    SIGMOIDOSCOPY FLEXIBLE N/A 2022    Procedure: SIGMOIDOSCOPY, FLEXIBLE;  Surgeon: Uriah Sheridan MD;  [facility]    Surgery for SBO      TONSILLECTOMY, ADENOIDECTOMY, COMBINED  1997    TRANSPLANT  5/10/13    Pancreatic Auto-Islet Transplant       Family History:  New changes since last visit:  none  Family History   Problem Relation Age of Onset    Family History Negative Mother     Respiratory Father         COPD;  at 69    Genitourinary Problems Father         kidney stones    Substance Abuse Father     Depression Father     Asthma Father     Heart Disease Paternal Grandfather         M.I.    Coronary Artery Disease Paternal Grandfather     Hyperlipidemia Paternal Grandfather     Genitourinary Problems Brother         multiple brothers with kidney stones     Gastrointestinal Disease Maternal Grandmother         undiagnosed 'gut' issues    Coronary Artery Disease Maternal Grandfather     Hyperlipidemia Maternal Grandfather     Cerebrovascular Disease Paternal Grandmother         At the age of 103    Anxiety Disorder Paternal Grandmother     Osteoporosis Paternal Grandmother     Anxiety Disorder Son     Anxiety Disorder Daughter     Asthma Daughter     Colon Cancer No family hx of        Social History:  Social History     Social History Narrative    Not on file     Does not work currently.  Mom to many foster dogs     Physical Exam:  Vitals: LMP 12/19/2013   BMI= There is no height or weight on file to calculate BMI.  General:  Appearance is normal, no acute distress  HEENT:  NC/AT, sclera appear white  Neck:  No thyroid nodule palpable.   Neck:  No obvious thyromegaly  CV/Lungs:  Non distressed breathing  Skin:  No apparent rashes.   Neuro:  Normal mental status  Psych:  Normal affect    Results  Mixed Meal Test:  C Peptide   Date Value Ref Range Status   09/15/2022 1.6 0.9 - 6.9 ng/mL Final   09/15/2022 1.2 0.9 - 6.9 ng/mL Final   09/15/2022 0.3 (L) 0.9 - 6.9 ng/mL Final   10/07/2021 1.4 0.9 - 6.9 ng/mL Final   10/07/2021 1.2 0.9 - 6.9 ng/mL Final   08/20/2020 3.4 0.9 - 6.9 ng/mL Final   08/20/2020 1.5 0.9 - 6.9 ng/mL Final   08/20/2020 0.5 (L) 0.9 - 6.9 ng/mL Final   05/16/2019 1.8 0.9 - 6.9 ng/mL Final   05/16/2019 1.5 0.9 - 6.9 ng/mL Final     Glucose   Date Value Ref Range Status   04/03/2023 87 70 - 99 mg/dL Final   01/05/2023 111 (H) 70 - 99 mg/dL Final   10/31/2022 97 70 - 99 mg/dL Final   09/23/2022 171 (H) 70 - 99 mg/dL Final   06/14/2022 104 (H) 70 - 99 mg/dL Final   04/16/2022 241 (H) 70 - 99 mg/dL Final   02/01/2022 90 70 - 99 mg/dL Final   01/18/2022 123 (H) 70 - 99 mg/dL Final   11/26/2021 139 (H) 70 - 99 mg/dL Final   05/10/2021 169 (H) 70 - 99 mg/dL Final   03/01/2021 141 (H) 70 - 99 mg/dL Final   02/28/2021 120 (H) 70 - 99 mg/dL Final   02/16/2021 106  (H) 70 - 99 mg/dL Final   10/30/2020 126 (H) 70 - 99 mg/dL Final     GLUCOSE BY METER POCT   Date Value Ref Range Status   09/23/2022 216 (H) 70 - 99 mg/dL Final   09/23/2022 157 (H) 70 - 99 mg/dL Final   09/23/2022 192 (H) 70 - 99 mg/dL Final   09/23/2022 71 70 - 99 mg/dL Final   09/22/2022 303 (H) 70 - 99 mg/dL Final       Hemoglobin A1c  Lab Results   Component Value Date    A1C 7.7 09/15/2022    A1C 8.2 05/08/2022    A1C 6.8 01/18/2022    A1C 6.5 10/07/2021    A1C 7.0 09/03/2021    A1C 6.8 05/10/2021    A1C 6.9 02/16/2021    A1C 6.7 08/20/2020    A1C 7.1 06/05/2020    A1C 6.9 02/06/2020       Liver enzymes  Lab Results   Component Value Date    AST 46 01/05/2023    AST 24 05/10/2021     Lab Results   Component Value Date    ALT 49 01/05/2023    ALT 35 05/10/2021     Lab Results   Component Value Date    BILICONJ 0.0 05/12/2014      Lab Results   Component Value Date    BILITOTAL 0.2 01/05/2023    BILITOTAL 0.3 05/10/2021     Lab Results   Component Value Date    ALBUMIN 4.0 01/05/2023    ALBUMIN 3.2 04/16/2022    ALBUMIN 3.4 05/10/2021     Lab Results   Component Value Date    PROTTOTAL 6.3 01/05/2023    PROTTOTAL 6.9 05/10/2021      Lab Results   Component Value Date    ALKPHOS 111 01/05/2023    ALKPHOS 132 05/10/2021       Creatinine:  Creatinine   Date Value Ref Range Status   04/03/2023 0.77 0.51 - 0.95 mg/dL Final   05/10/2021 0.81 0.52 - 1.04 mg/dL Final   ]    Complete Blood Count:  CBC RESULTS:   Recent Labs   Lab Test 01/05/23  1342   WBC 7.8   RBC 3.90   HGB 11.7   HCT 36.2   MCV 93   MCH 30.0   MCHC 32.3   RDW 14.7          Cholesterol  Lab Results   Component Value Date    CHOL 230 01/05/2023    CHOL 192 09/17/2020     Lab Results   Component Value Date    HDL 97 01/05/2023    HDL 91 09/17/2020     Lab Results   Component Value Date     01/05/2023    LDL 84 09/17/2020     Lab Results   Component Value Date    TRIG 89 01/05/2023    TRIG 83 09/17/2020     Lab Results   Component Value  Date    CHOLHDLRATIO 1.9 11/20/2014       TSH   Date Value Ref Range Status   03/24/2023 0.50 0.30 - 4.20 uIU/mL Final   08/17/2021 2.75 0.40 - 4.00 mU/L Final   03/15/2021 2.93 0.40 - 4.00 mU/L Final     T4 Free   Date Value Ref Range Status   06/05/2020 1.05 0.76 - 1.46 ng/dL Final   01/22/2019 0.90 0.76 - 1.46 ng/dL Final   01/04/2018 0.72 (L) 0.76 - 1.46 ng/dL Final   03/06/2017 0.95 0.76 - 1.46 ng/dL Final   01/30/2017 0.78 0.76 - 1.46 ng/dL Final     Free T4   Date Value Ref Range Status   03/24/2023 1.26 0.90 - 1.70 ng/dL Final   01/05/2023 0.71 (L) 0.90 - 1.70 ng/dL Final   08/17/2021 1.06 0.76 - 1.46 ng/dL Final           Assessment:  1.  Post pancreatectomy diabetes mellitus, s/p total pancreatectomy and islet autotransplant.    Lynn is a 58 year old with history of chronic pancreatitis who is s/p total pancreatectomy and islet autotransplant.  She was insulin independent until 6/6/2017 (just after 4 years post-tx) and now has partial islet function.    She is on Fiasp due to rapid gastric emptying and post meal hypoglycemia.      She has started on the Omnipod 5.  She clearly has improved TIR since starting the pump with much less hypoglycemia.  Major issue today is post meal hyperglycemia, which I suspect is related to physiologic stress with recent surgery and recovery.  Changes made as below.       Plan:  1.  Changes to current diabetes regimen:  Patient Instructions     1)  Let's go to 20 grams on your carb settings since you are running high after meals, especially with higher carb meals.    2)  Let me know if you are still seeing a lot of highs, or start to have lows.      Follow Up Sept 7   -- with me at 2:20pm.   -- Come in before this for fasting lab draw and boost test so will need lab around 10am, then nursing visit for Boost.        2.  Frequency of blood sugar checks:  premeal and bedtime, and as needed for hypoglycemia (6 strip per day).    3.  Continue routine follow up for autoislet  transplant patients:  Mixed meal test (6 mL/kg BoostHP to max of 360 mL) at 3 months, 6 months, and once a year post transplant.  Hemoglobin A1c levels at these time points and quarterly.    4.  Other issues addressed today:  As above    Follow up:  As above        Contact me for questions at [phone] or [phone].  Emergency number to reach pediatric endocrinology after hours is [phone].    Dr. Adriana Robles MD  [facility] [place]  Director of Research, Islet Auto-transplant Program  Phone:  [phone]  Electronically signed: May 7, 2023 at 7:52 AM      Review of the result(s) of each unique test - as indicated above  Prescription drug management  40 minutes spent on the date of the encounter doing chart review, history and exam, documentation and further activities per the note          Again, thank you for allowing me to participate in the care of your patient.        Sincerely,        Adriana Robles MD

## 2023-05-05 ENCOUNTER — TRANSFERRED RECORDS (OUTPATIENT)
Dept: HEALTH INFORMATION MANAGEMENT | Facility: CLINIC | Age: 58
End: 2023-05-05
Payer: COMMERCIAL

## 2023-05-08 ENCOUNTER — MEDICAL CORRESPONDENCE (OUTPATIENT)
Dept: HEALTH INFORMATION MANAGEMENT | Facility: CLINIC | Age: 58
End: 2023-05-08
Payer: COMMERCIAL

## 2023-06-19 DIAGNOSIS — Z90.410 POST-PANCREATECTOMY DIABETES (H): ICD-10-CM

## 2023-06-19 DIAGNOSIS — E89.1 POST-PANCREATECTOMY DIABETES (H): ICD-10-CM

## 2023-06-19 DIAGNOSIS — E13.9 POST-PANCREATECTOMY DIABETES (H): ICD-10-CM

## 2023-06-20 RX ORDER — PROCHLORPERAZINE 25 MG/1
SUPPOSITORY RECTAL
Qty: 1 EACH | Refills: 1 | Status: SHIPPED | OUTPATIENT
Start: 2023-06-20 | End: 2023-11-22

## 2023-06-20 RX ORDER — PROCHLORPERAZINE 25 MG/1
SUPPOSITORY RECTAL
Qty: 3 EACH | Refills: 5 | Status: SHIPPED | OUTPATIENT
Start: 2023-06-20 | End: 2023-11-22

## 2023-06-30 ENCOUNTER — TRANSFERRED RECORDS (OUTPATIENT)
Dept: HEALTH INFORMATION MANAGEMENT | Facility: CLINIC | Age: 58
End: 2023-06-30

## 2023-06-30 ENCOUNTER — HOSPITAL ENCOUNTER (INPATIENT)
Facility: CLINIC | Age: 58
LOS: 3 days | Discharge: HOME | End: 2023-07-03
Attending: EMERGENCY MEDICINE | Admitting: HOSPITALIST
Payer: COMMERCIAL

## 2023-06-30 ENCOUNTER — APPOINTMENT (OUTPATIENT)
Dept: CT IMAGING | Facility: CLINIC | Age: 58
End: 2023-06-30
Attending: EMERGENCY MEDICINE
Payer: COMMERCIAL

## 2023-06-30 DIAGNOSIS — N10 ACUTE PYELONEPHRITIS: ICD-10-CM

## 2023-06-30 DIAGNOSIS — A41.9 ACUTE SEPSIS (H): ICD-10-CM

## 2023-06-30 DIAGNOSIS — B37.31 YEAST INFECTION OF THE VAGINA: Primary | ICD-10-CM

## 2023-06-30 DIAGNOSIS — Q89.01 ASPLENIA: ICD-10-CM

## 2023-06-30 LAB
ALBUMIN SERPL BCG-MCNC: 3.8 G/DL (ref 3.5–5.2)
ALBUMIN UR-MCNC: 30 MG/DL
ALP SERPL-CCNC: 132 U/L (ref 35–104)
ALT SERPL W P-5'-P-CCNC: 21 U/L (ref 0–50)
ANION GAP SERPL CALCULATED.3IONS-SCNC: 11 MMOL/L (ref 7–15)
APPEARANCE UR: ABNORMAL
AST SERPL W P-5'-P-CCNC: 23 U/L (ref 0–45)
BACTERIA #/AREA URNS HPF: ABNORMAL /HPF
BASOPHILS # BLD AUTO: 0.1 10E3/UL (ref 0–0.2)
BASOPHILS NFR BLD AUTO: 1 %
BILIRUB SERPL-MCNC: 0.3 MG/DL
BILIRUB UR QL STRIP: NEGATIVE
BUN SERPL-MCNC: 22.5 MG/DL (ref 8–23)
CALCIUM SERPL-MCNC: 9.1 MG/DL (ref 8.8–10.2)
CHLORIDE SERPL-SCNC: 100 MMOL/L (ref 98–107)
COLOR UR AUTO: YELLOW
CREAT SERPL-MCNC: 0.97 MG/DL (ref 0.51–0.95)
DEPRECATED HCO3 PLAS-SCNC: 24 MMOL/L (ref 22–29)
EOSINOPHIL # BLD AUTO: 0.1 10E3/UL (ref 0–0.7)
EOSINOPHIL NFR BLD AUTO: 1 %
ERYTHROCYTE [DISTWIDTH] IN BLOOD BY AUTOMATED COUNT: 13.5 % (ref 10–15)
FLUAV RNA SPEC QL NAA+PROBE: NEGATIVE
FLUBV RNA RESP QL NAA+PROBE: NEGATIVE
GFR SERPL CREATININE-BSD FRML MDRD: 67 ML/MIN/1.73M2
GLUCOSE SERPL-MCNC: 338 MG/DL (ref 70–99)
GLUCOSE UR STRIP-MCNC: 1000 MG/DL
HCT VFR BLD AUTO: 34.4 % (ref 35–47)
HGB BLD-MCNC: 10.7 G/DL (ref 11.7–15.7)
HGB UR QL STRIP: ABNORMAL
IMM GRANULOCYTES # BLD: 0.1 10E3/UL
IMM GRANULOCYTES NFR BLD: 1 %
KETONES UR STRIP-MCNC: NEGATIVE MG/DL
LACTATE SERPL-SCNC: 1.4 MMOL/L (ref 0.7–2)
LEUKOCYTE ESTERASE UR QL STRIP: ABNORMAL
LYMPHOCYTES # BLD AUTO: 1.4 10E3/UL (ref 0.8–5.3)
LYMPHOCYTES NFR BLD AUTO: 11 %
MCH RBC QN AUTO: 28.5 PG (ref 26.5–33)
MCHC RBC AUTO-ENTMCNC: 31.1 G/DL (ref 31.5–36.5)
MCV RBC AUTO: 92 FL (ref 78–100)
MONOCYTES # BLD AUTO: 1.9 10E3/UL (ref 0–1.3)
MONOCYTES NFR BLD AUTO: 14 %
NEUTROPHILS # BLD AUTO: 9.8 10E3/UL (ref 1.6–8.3)
NEUTROPHILS NFR BLD AUTO: 72 %
NITRATE UR QL: NEGATIVE
NRBC # BLD AUTO: 0 10E3/UL
NRBC BLD AUTO-RTO: 0 /100
PH UR STRIP: 5.5 [PH] (ref 5–7)
PLATELET # BLD AUTO: 416 10E3/UL (ref 150–450)
POTASSIUM SERPL-SCNC: 4.2 MMOL/L (ref 3.4–5.3)
PROCALCITONIN SERPL IA-MCNC: 0.13 NG/ML
PROT SERPL-MCNC: 6.9 G/DL (ref 6.4–8.3)
RBC # BLD AUTO: 3.76 10E6/UL (ref 3.8–5.2)
RBC URINE: 3 /HPF
RSV RNA SPEC NAA+PROBE: NEGATIVE
SARS-COV-2 RNA RESP QL NAA+PROBE: NEGATIVE
SODIUM SERPL-SCNC: 135 MMOL/L (ref 136–145)
SP GR UR STRIP: 1.01 (ref 1–1.03)
SQUAMOUS EPITHELIAL: 1 /HPF
UROBILINOGEN UR STRIP-MCNC: NORMAL MG/DL
WBC # BLD AUTO: 13.3 10E3/UL (ref 4–11)
WBC URINE: >182 /HPF

## 2023-06-30 PROCEDURE — 85025 COMPLETE CBC W/AUTO DIFF WBC: CPT | Performed by: EMERGENCY MEDICINE

## 2023-06-30 PROCEDURE — 84145 PROCALCITONIN (PCT): CPT | Performed by: EMERGENCY MEDICINE

## 2023-06-30 PROCEDURE — 96375 TX/PRO/DX INJ NEW DRUG ADDON: CPT

## 2023-06-30 PROCEDURE — C9803 HOPD COVID-19 SPEC COLLECT: HCPCS

## 2023-06-30 PROCEDURE — 83605 ASSAY OF LACTIC ACID: CPT | Performed by: EMERGENCY MEDICINE

## 2023-06-30 PROCEDURE — 36415 COLL VENOUS BLD VENIPUNCTURE: CPT | Performed by: EMERGENCY MEDICINE

## 2023-06-30 PROCEDURE — 81001 URINALYSIS AUTO W/SCOPE: CPT | Performed by: EMERGENCY MEDICINE

## 2023-06-30 PROCEDURE — 96365 THER/PROPH/DIAG IV INF INIT: CPT | Mod: 59

## 2023-06-30 PROCEDURE — 87149 DNA/RNA DIRECT PROBE: CPT | Performed by: EMERGENCY MEDICINE

## 2023-06-30 PROCEDURE — 99223 1ST HOSP IP/OBS HIGH 75: CPT | Mod: A1 | Performed by: HOSPITALIST

## 2023-06-30 PROCEDURE — 120N000001 HC R&B MED SURG/OB

## 2023-06-30 PROCEDURE — 96361 HYDRATE IV INFUSION ADD-ON: CPT

## 2023-06-30 PROCEDURE — 250N000011 HC RX IP 250 OP 636: Performed by: EMERGENCY MEDICINE

## 2023-06-30 PROCEDURE — 87086 URINE CULTURE/COLONY COUNT: CPT | Performed by: EMERGENCY MEDICINE

## 2023-06-30 PROCEDURE — 87637 SARSCOV2&INF A&B&RSV AMP PRB: CPT | Performed by: EMERGENCY MEDICINE

## 2023-06-30 PROCEDURE — 80053 COMPREHEN METABOLIC PANEL: CPT | Performed by: EMERGENCY MEDICINE

## 2023-06-30 PROCEDURE — 87077 CULTURE AEROBIC IDENTIFY: CPT | Performed by: EMERGENCY MEDICINE

## 2023-06-30 PROCEDURE — 99285 EMERGENCY DEPT VISIT HI MDM: CPT | Mod: 25

## 2023-06-30 PROCEDURE — 258N000003 HC RX IP 258 OP 636: Performed by: EMERGENCY MEDICINE

## 2023-06-30 PROCEDURE — 250N000013 HC RX MED GY IP 250 OP 250 PS 637: Performed by: EMERGENCY MEDICINE

## 2023-06-30 PROCEDURE — 250N000009 HC RX 250: Performed by: EMERGENCY MEDICINE

## 2023-06-30 PROCEDURE — 93005 ELECTROCARDIOGRAM TRACING: CPT

## 2023-06-30 PROCEDURE — G1010 CDSM STANSON: HCPCS

## 2023-06-30 PROCEDURE — 74177 CT ABD & PELVIS W/CONTRAST: CPT | Mod: MG

## 2023-06-30 RX ORDER — ACETAMINOPHEN 325 MG/1
650 TABLET ORAL ONCE
Status: COMPLETED | OUTPATIENT
Start: 2023-06-30 | End: 2023-06-30

## 2023-06-30 RX ORDER — HYDROMORPHONE HYDROCHLORIDE 1 MG/ML
0.5 INJECTION, SOLUTION INTRAMUSCULAR; INTRAVENOUS; SUBCUTANEOUS
Status: DISPENSED | OUTPATIENT
Start: 2023-06-30 | End: 2023-06-30

## 2023-06-30 RX ORDER — IOPAMIDOL 755 MG/ML
500 INJECTION, SOLUTION INTRAVASCULAR ONCE
Status: COMPLETED | OUTPATIENT
Start: 2023-06-30 | End: 2023-06-30

## 2023-06-30 RX ADMIN — ACETAMINOPHEN 650 MG: 325 TABLET, FILM COATED ORAL at 20:38

## 2023-06-30 RX ADMIN — SODIUM CHLORIDE 2000 ML: 9 INJECTION, SOLUTION INTRAVENOUS at 20:46

## 2023-06-30 RX ADMIN — IOPAMIDOL 63 ML: 755 INJECTION, SOLUTION INTRAVENOUS at 21:11

## 2023-06-30 RX ADMIN — HYDROMORPHONE HYDROCHLORIDE 0.5 MG: 1 INJECTION, SOLUTION INTRAMUSCULAR; INTRAVENOUS; SUBCUTANEOUS at 22:34

## 2023-06-30 RX ADMIN — PIPERACILLIN SODIUM AND TAZOBACTAM SODIUM 4.5 G: 4; .5 INJECTION, SOLUTION INTRAVENOUS at 20:48

## 2023-06-30 RX ADMIN — SODIUM CHLORIDE 56 ML: 9 INJECTION, SOLUTION INTRAVENOUS at 21:11

## 2023-06-30 ASSESSMENT — ACTIVITIES OF DAILY LIVING (ADL)
ADLS_ACUITY_SCORE: 37
ADLS_ACUITY_SCORE: 37

## 2023-07-01 LAB
ACINETOBACTER SPECIES: NOT DETECTED
ANION GAP SERPL CALCULATED.3IONS-SCNC: 8 MMOL/L (ref 7–15)
BUN SERPL-MCNC: 16.3 MG/DL (ref 8–23)
CALCIUM SERPL-MCNC: 8.5 MG/DL (ref 8.8–10.2)
CHLORIDE SERPL-SCNC: 108 MMOL/L (ref 98–107)
CITROBACTER SPECIES: NOT DETECTED
CREAT SERPL-MCNC: 0.9 MG/DL (ref 0.51–0.95)
CTX-M: NOT DETECTED
DEPRECATED HCO3 PLAS-SCNC: 23 MMOL/L (ref 22–29)
ENTEROBACTER SPECIES: NOT DETECTED
ERYTHROCYTE [DISTWIDTH] IN BLOOD BY AUTOMATED COUNT: 13.9 % (ref 10–15)
ESCHERICHIA COLI: DETECTED
GFR SERPL CREATININE-BSD FRML MDRD: 73 ML/MIN/1.73M2
GLUCOSE SERPL-MCNC: 101 MG/DL (ref 70–99)
HCT VFR BLD AUTO: 32.3 % (ref 35–47)
HGB BLD-MCNC: 10.2 G/DL (ref 11.7–15.7)
IMP: NOT DETECTED
KLEBSIELLA OXYTOCA: NOT DETECTED
KLEBSIELLA PNEUMONIAE: NOT DETECTED
KPC: NOT DETECTED
MCH RBC QN AUTO: 28.9 PG (ref 26.5–33)
MCHC RBC AUTO-ENTMCNC: 31.6 G/DL (ref 31.5–36.5)
MCV RBC AUTO: 92 FL (ref 78–100)
NDM: NOT DETECTED
OXA (DETECTED/NOT DETECTED): NOT DETECTED
PLATELET # BLD AUTO: 347 10E3/UL (ref 150–450)
POTASSIUM SERPL-SCNC: 4.2 MMOL/L (ref 3.4–5.3)
PROTEUS SPECIES: NOT DETECTED
PSEUDOMONAS AERUGINOSA: NOT DETECTED
RBC # BLD AUTO: 3.53 10E6/UL (ref 3.8–5.2)
SODIUM SERPL-SCNC: 139 MMOL/L (ref 136–145)
VIM: NOT DETECTED
WBC # BLD AUTO: 13.2 10E3/UL (ref 4–11)

## 2023-07-01 PROCEDURE — 87040 BLOOD CULTURE FOR BACTERIA: CPT | Performed by: STUDENT IN AN ORGANIZED HEALTH CARE EDUCATION/TRAINING PROGRAM

## 2023-07-01 PROCEDURE — 120N000001 HC R&B MED SURG/OB

## 2023-07-01 PROCEDURE — 36415 COLL VENOUS BLD VENIPUNCTURE: CPT | Performed by: STUDENT IN AN ORGANIZED HEALTH CARE EDUCATION/TRAINING PROGRAM

## 2023-07-01 PROCEDURE — 85027 COMPLETE CBC AUTOMATED: CPT | Performed by: HOSPITALIST

## 2023-07-01 PROCEDURE — 250N000013 HC RX MED GY IP 250 OP 250 PS 637: Performed by: INTERNAL MEDICINE

## 2023-07-01 PROCEDURE — 36415 COLL VENOUS BLD VENIPUNCTURE: CPT | Performed by: HOSPITALIST

## 2023-07-01 PROCEDURE — 99232 SBSQ HOSP IP/OBS MODERATE 35: CPT | Performed by: INTERNAL MEDICINE

## 2023-07-01 PROCEDURE — 250N000011 HC RX IP 250 OP 636: Performed by: HOSPITALIST

## 2023-07-01 PROCEDURE — 250N000011 HC RX IP 250 OP 636: Mod: JZ | Performed by: INTERNAL MEDICINE

## 2023-07-01 PROCEDURE — 80048 BASIC METABOLIC PNL TOTAL CA: CPT | Performed by: HOSPITALIST

## 2023-07-01 PROCEDURE — 258N000003 HC RX IP 258 OP 636: Performed by: HOSPITALIST

## 2023-07-01 RX ORDER — PROCHLORPERAZINE MALEATE 5 MG
10 TABLET ORAL EVERY 6 HOURS PRN
Status: DISCONTINUED | OUTPATIENT
Start: 2023-07-01 | End: 2023-07-03 | Stop reason: HOSPADM

## 2023-07-01 RX ORDER — ONDANSETRON 2 MG/ML
4 INJECTION INTRAMUSCULAR; INTRAVENOUS EVERY 6 HOURS PRN
Status: DISCONTINUED | OUTPATIENT
Start: 2023-07-01 | End: 2023-07-03 | Stop reason: HOSPADM

## 2023-07-01 RX ORDER — NALOXONE HYDROCHLORIDE 0.4 MG/ML
0.4 INJECTION, SOLUTION INTRAMUSCULAR; INTRAVENOUS; SUBCUTANEOUS
Status: DISCONTINUED | OUTPATIENT
Start: 2023-07-01 | End: 2023-07-03 | Stop reason: HOSPADM

## 2023-07-01 RX ORDER — NICOTINE POLACRILEX 4 MG
15-30 LOZENGE BUCCAL
Status: DISCONTINUED | OUTPATIENT
Start: 2023-07-01 | End: 2023-07-03 | Stop reason: HOSPADM

## 2023-07-01 RX ORDER — ACETAMINOPHEN 325 MG/1
650 TABLET ORAL EVERY 4 HOURS PRN
Status: DISCONTINUED | OUTPATIENT
Start: 2023-07-01 | End: 2023-07-03 | Stop reason: HOSPADM

## 2023-07-01 RX ORDER — ACETAMINOPHEN 650 MG/1
650 SUPPOSITORY RECTAL EVERY 4 HOURS PRN
Status: DISCONTINUED | OUTPATIENT
Start: 2023-07-01 | End: 2023-07-03 | Stop reason: HOSPADM

## 2023-07-01 RX ORDER — NALOXONE HYDROCHLORIDE 0.4 MG/ML
0.2 INJECTION, SOLUTION INTRAMUSCULAR; INTRAVENOUS; SUBCUTANEOUS
Status: DISCONTINUED | OUTPATIENT
Start: 2023-07-01 | End: 2023-07-03 | Stop reason: HOSPADM

## 2023-07-01 RX ORDER — SODIUM CHLORIDE 9 MG/ML
INJECTION, SOLUTION INTRAVENOUS CONTINUOUS
Status: DISCONTINUED | OUTPATIENT
Start: 2023-07-01 | End: 2023-07-01

## 2023-07-01 RX ORDER — ONDANSETRON 4 MG/1
4 TABLET, ORALLY DISINTEGRATING ORAL EVERY 6 HOURS PRN
Status: DISCONTINUED | OUTPATIENT
Start: 2023-07-01 | End: 2023-07-03 | Stop reason: HOSPADM

## 2023-07-01 RX ORDER — OXYCODONE HYDROCHLORIDE 5 MG/1
5 TABLET ORAL EVERY 6 HOURS PRN
Status: DISCONTINUED | OUTPATIENT
Start: 2023-07-01 | End: 2023-07-03 | Stop reason: HOSPADM

## 2023-07-01 RX ORDER — ENOXAPARIN SODIUM 100 MG/ML
40 INJECTION SUBCUTANEOUS EVERY 24 HOURS
Status: DISCONTINUED | OUTPATIENT
Start: 2023-07-01 | End: 2023-07-03 | Stop reason: HOSPADM

## 2023-07-01 RX ORDER — LIDOCAINE 40 MG/G
CREAM TOPICAL
Status: DISCONTINUED | OUTPATIENT
Start: 2023-07-01 | End: 2023-07-03 | Stop reason: HOSPADM

## 2023-07-01 RX ORDER — PROCHLORPERAZINE 25 MG
25 SUPPOSITORY, RECTAL RECTAL EVERY 12 HOURS PRN
Status: DISCONTINUED | OUTPATIENT
Start: 2023-07-01 | End: 2023-07-03 | Stop reason: HOSPADM

## 2023-07-01 RX ORDER — DEXTROSE MONOHYDRATE 25 G/50ML
25-50 INJECTION, SOLUTION INTRAVENOUS
Status: DISCONTINUED | OUTPATIENT
Start: 2023-07-01 | End: 2023-07-03 | Stop reason: HOSPADM

## 2023-07-01 RX ADMIN — PIPERACILLIN SODIUM AND TAZOBACTAM SODIUM 3.38 G: 3; .375 INJECTION, SOLUTION INTRAVENOUS at 08:21

## 2023-07-01 RX ADMIN — ACETAMINOPHEN 650 MG: 325 TABLET, FILM COATED ORAL at 07:06

## 2023-07-01 RX ADMIN — OXYCODONE HYDROCHLORIDE 5 MG: 5 TABLET ORAL at 02:13

## 2023-07-01 RX ADMIN — OXYCODONE HYDROCHLORIDE 5 MG: 5 TABLET ORAL at 07:06

## 2023-07-01 RX ADMIN — PIPERACILLIN SODIUM AND TAZOBACTAM SODIUM 3.38 G: 3; .375 INJECTION, SOLUTION INTRAVENOUS at 02:13

## 2023-07-01 RX ADMIN — ENOXAPARIN SODIUM 40 MG: 40 INJECTION SUBCUTANEOUS at 11:47

## 2023-07-01 RX ADMIN — OXYCODONE HYDROCHLORIDE 5 MG: 5 TABLET ORAL at 22:06

## 2023-07-01 RX ADMIN — SODIUM CHLORIDE: 9 INJECTION, SOLUTION INTRAVENOUS at 00:06

## 2023-07-01 RX ADMIN — PIPERACILLIN SODIUM AND TAZOBACTAM SODIUM 3.38 G: 3; .375 INJECTION, SOLUTION INTRAVENOUS at 20:09

## 2023-07-01 RX ADMIN — SODIUM CHLORIDE: 9 INJECTION, SOLUTION INTRAVENOUS at 07:06

## 2023-07-01 RX ADMIN — PIPERACILLIN SODIUM AND TAZOBACTAM SODIUM 3.38 G: 3; .375 INJECTION, SOLUTION INTRAVENOUS at 14:11

## 2023-07-01 RX ADMIN — OXYCODONE HYDROCHLORIDE 5 MG: 5 TABLET ORAL at 14:11

## 2023-07-01 RX ADMIN — ACETAMINOPHEN 650 MG: 325 TABLET, FILM COATED ORAL at 02:13

## 2023-07-01 RX ADMIN — ACETAMINOPHEN 650 MG: 325 TABLET, FILM COATED ORAL at 11:47

## 2023-07-01 ASSESSMENT — ACTIVITIES OF DAILY LIVING (ADL)
HEARING_DIFFICULTY_OR_DEAF: NO
CONCENTRATING,_REMEMBERING_OR_MAKING_DECISIONS_DIFFICULTY: NO
ADLS_ACUITY_SCORE: 24
DIFFICULTY_COMMUNICATING: NO
WALKING_OR_CLIMBING_STAIRS_DIFFICULTY: NO
DIFFICULTY_EATING/SWALLOWING: NO
DRESSING/BATHING_DIFFICULTY: NO
ADLS_ACUITY_SCORE: 24
DOING_ERRANDS_INDEPENDENTLY_DIFFICULTY: YES
ADLS_ACUITY_SCORE: 24
TOILETING_ISSUES: NO
ADLS_ACUITY_SCORE: 24
CHANGE_IN_FUNCTIONAL_STATUS_SINCE_ONSET_OF_CURRENT_ILLNESS/INJURY: NO
VISION_MANAGEMENT: WEARS GLASSES
WEAR_GLASSES_OR_BLIND: YES
ADLS_ACUITY_SCORE: 24
FALL_HISTORY_WITHIN_LAST_SIX_MONTHS: NO

## 2023-07-01 NOTE — ED NOTES
[facility]  ED Nurse Handoff Report    ED Chief complaint: Fatigue and Fever  . ED Diagnosis:   Final diagnoses:   Acute sepsis (H)   Acute pyelonephritis   Asplenia       Allergies:   Allergies   Allergen Reactions     Corticosteroids Other (See Comments)     All oral, IV and injectable steroids are contraindicated (unless in life threatening situations) in Islet Auto transplant recipients. They can cause irreversible loss of islet cell function. Please contact patient's transplant care coordinator, Erlinda Multani RN BSN at [phone]/pager: [phone] and endocrinologist prior to administration.       Povidone Iodine Hives     Causes skin to blister     Nsaids      naprosyn = GI upset     Sulfasalazine Nausea and Nausea and Vomiting       Code Status: Full Code    Activity level - Baseline/Home:  independent.  Activity Level - Current:   independent.   Lift room needed: No.   Bariatric: No   Needed: No   Isolation: No.   Infection: Not Applicable.     Respiratory status: Room air    Vital Signs (within 30 minutes):   Vitals:    06/30/23 2057 06/30/23 2235 06/30/23 2237 06/30/23 2242   BP:  105/46     Pulse:  70     Resp:       Temp:       TempSrc:       SpO2: 97%  95% 94%   Height:           Cardiac Rhythm:  ,      Pain level:    Patient confused: No.   Patient Falls Risk: nonskid shoes/slippers when out of bed and patient and family education.   Elimination Status: Has voided     Patient Report - Initial Complaint: fever, body aches, and flank pain.   Focused Assessment:  Pt comes in with fever, body aches, and flank pain. Pt has hx of UTI's, and kidney stones complicated with infection. Pt reports fever started last night and got progressively worse with body aches and flank pain.      Abnormal Results:   Labs Ordered and Resulted from Time of ED Arrival to Time of ED Departure   ROUTINE UA WITH MICROSCOPIC REFLEX TO CULTURE - Abnormal       Result Value    Color Urine  Yellow      Appearance Urine Slightly Cloudy (*)     Glucose Urine 1000 (*)     Bilirubin Urine Negative      Ketones Urine Negative      Specific Gravity Urine 1.009      Blood Urine Trace (*)     pH Urine 5.5      Protein Albumin Urine 30 (*)     Urobilinogen Urine Normal      Nitrite Urine Negative      Leukocyte Esterase Urine Large (*)     Bacteria Urine Many (*)     RBC Urine 3 (*)     WBC Urine >182 (*)     Squamous Epithelials Urine 1     COMPREHENSIVE METABOLIC PANEL - Abnormal    Sodium 135 (*)     Potassium 4.2      Chloride 100      Carbon Dioxide (CO2) 24      Anion Gap 11      Urea Nitrogen 22.5      Creatinine 0.97 (*)     Calcium 9.1      Glucose 338 (*)     Alkaline Phosphatase 132 (*)     AST 23      ALT 21      Protein Total 6.9      Albumin 3.8      Bilirubin Total 0.3      GFR Estimate 67     PROCALCITONIN - Abnormal    Procalcitonin 0.13 (*)    CBC WITH PLATELETS AND DIFFERENTIAL - Abnormal    WBC Count 13.3 (*)     RBC Count 3.76 (*)     Hemoglobin 10.7 (*)     Hematocrit 34.4 (*)     MCV 92      MCH 28.5      MCHC 31.1 (*)     RDW 13.5      Platelet Count 416      % Neutrophils 72      % Lymphocytes 11      % Monocytes 14      % Eosinophils 1      % Basophils 1      % Immature Granulocytes 1      NRBCs per 100 WBC 0      Absolute Neutrophils 9.8 (*)     Absolute Lymphocytes 1.4      Absolute Monocytes 1.9 (*)     Absolute Eosinophils 0.1      Absolute Basophils 0.1      Absolute Immature Granulocytes 0.1      Absolute NRBCs 0.0     LACTIC ACID WHOLE BLOOD - Normal    Lactic Acid 1.4     INFLUENZA A/B, RSV, & SARS-COV2 PCR   BLOOD CULTURE   BLOOD CULTURE   URINE CULTURE        CT Abdomen Pelvis w Contrast   Final Result   IMPRESSION:       1.  Ascending urinary tract infection with cystitis, bilateral ureteritis and pyelitis, and early bilateral pyelonephritis. No renal abscess.      2.  Bilateral nonobstructing intrarenal calculi measuring up to 4 mm on the left. No hydronephrosis.           Treatments provided: antibiotics and pain medication  Family Comments:  at bedside  OBS brochure/video discussed/provided to patient:  Yes  ED Medications:   Medications   HYDROmorphone (PF) (DILAUDID) injection 0.5 mg (0.5 mg Intravenous $Given 6/30/23 2234)   0.9% sodium chloride BOLUS (0 mLs Intravenous Stopped 6/30/23 2236)   piperacillin-tazobactam (ZOSYN) intermittent infusion 4.5 g (0 g Intravenous Stopped 6/30/23 2118)   acetaminophen (TYLENOL) tablet 650 mg (650 mg Oral $Given 6/30/23 2038)   iopamidol (ISOVUE-370) solution 500 mL (63 mLs Intravenous $Given 6/30/23 2111)   CT scan flush (56 mLs Intravenous $Given 6/30/23 2111)       Drips infusing:  No  For the majority of the shift this patient was Green.   Interventions performed were na.    Sepsis treatment initiated: No    Cares/treatment/interventions/medications to be completed following ED care: scheduled abx and PRN pain medication.    ED Nurse Name: Claudia Hebert RN  10:48 PM      RECEIVING UNIT ED HANDOFF REVIEW    Above ED Nurse Handoff Report was reviewed: Yes  Reviewed by: Rachel Jaquez RN on June 30, 2023 at 11:38 PM

## 2023-07-01 NOTE — PROGRESS NOTES
RT note:  Patient refused to use CPAP. Pt states she use Splint at home and her  will bring her Splint. .Etienne Lal, RT on 7/1/2023 at 10:24 AM

## 2023-07-01 NOTE — PROGRESS NOTES
"SPIRITUAL [facility] Progress Note  MS 5    Saw pt Lynn Thompson per admission request.    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 She stated that her goals of care are to \"get the infection under control\" so she can return home.    Distress and Loss - Lynn shared that she has had \"some big challenges\" in her life, including a major surgery in 2013 that changed her life.   She said that she was \"in a very dark place\" after the surgery, when she had to leave her job.  The surgery has an ongoing effect in Lynn's life, which includes having an insulin pump.  Lynn also described challenges in her childhood, including her parents' divorce, her father's alcoholism and paralysis, and her mother's lack of presence. She was raised by her grandmother, who she describes a positive person in her life and ongoing example.    Strengths, Coping, and Resources -   Lynn described her  as a strong support in her life, saying that \"we can get through anything together.\"  She also has children in the area who are supportive.  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 She describes her dogs and the puppies she is fostering as an emotional support during this time of illness.    Meaning, Beliefs, and Spirituality -   Lynn shared that her dogs are a great source of meaning in her life. She narrated that when she couldn't keep her job after her surgery, she was \"in a very dark place,\" and her daughter suggested that she rescue dogs. She says that rescuing has \"transformed my life\" and that she gets \"more than I ever give them.\"  Lynn is Protestant, and said that the value of helping others is very important in her life.    Plan of Care - No further needs at this time. " [facility] remains available upon request.    BONITA Trivedi.   Intern    [facility] routine referrals *[phone]   [facility] available 24/7 for emergent requests/referrals, either by paging the on-call  or by entering an ASAP/STAT consult in Epic (this will also page the on-call ).

## 2023-07-01 NOTE — PHARMACY-ADMISSION MEDICATION HISTORY
"Pharmacist Admission Medication History    Admission medication history is complete. The information provided in this note is only as accurate as the sources available at the time of the update.    Medication reconciliation/reorder completed by provider prior to medication history? No    Information Source(s): Patient via in-person    Pertinent Information: On insulin pump set in \"auto\" mode.    Changes made to PTA medication list:    Added: Omnipod pump settings    Deleted: modafinil, tramadol    Changed: None    Medication Affordability:  Not including over the counter (OTC) medications, was there a time in the past 3 months when you did not take your medications as prescribed because of cost?: No    Allergies reviewed with patient and updates made in EHR: yes  For patients on insulin therapy:  Do you use sliding scale insulin based on blood sugars? Yes- see pump settings  Do you typically eat three meals a day? Yes  How many times do you check your blood glucose per day? Dexcom CGM  How many episodes of hypoglycemia do you typically have per month? 3     Prior to Admission medications    Medication Sig Last Dose Taking? Auth Provider Long Term End Date   calcium carbonate 600 mg-vitamin D 400 units (CALTRATE) 600-400 MG-UNIT per tablet Take 1 tablet by mouth daily 6/30/2023 at am Yes Unknown, Entered By History     DULoxetine (CYMBALTA) 20 MG capsule Take 20 mg by mouth daily 6/30/2023 at am Yes Unknown, Entered By History No    escitalopram (LEXAPRO) 20 MG tablet Take 20 mg by mouth daily 6/30/2023 at am Yes Unknown, Entered By History No    estradiol (ESTRACE) 0.1 MG/GM vaginal cream Place vaginally twice a week PLACE 2 GRAMS VAGINALLY 2 TIMES WEEKLY  Patient taking differently: Place vaginally twice a week PLACE 2 GRAMS VAGINALLY 2 TIMES WEEKLY (Tues & Fri) 6/30/2023 at pm Yes Nicole Rivera MD     famotidine (PEPCID) 40 MG tablet Take 1 tablet (40 mg) by mouth 2 times daily as needed for heartburn " 2023 at pm Yes Maryana Brooks MD     FIASP PENFILL 100 UNIT/ML SOCT Inject 0.5-4 Units Subcutaneous 4 times daily, INJECT with meals and nightly  Yes Adriana Robles MD     gabapentin (NEURONTIN) 300 MG capsule Take 300 mg by mouth nightly as needed Past Week Yes Unknown, Entered By History No    Glucagon (GVOKE HYPOPEN 1-PACK) 1 MG/0.2ML SOAJ Inject 1 mg Subcutaneous once as needed (for hypoglycemia with loss of consciousness)  Yes Adriana Robles MD Yes    glucose 40 % GEL Take 15-30 g by mouth every 15 minutes as needed.  Yes Suzi Short, APRN CNP Yes    hydrOXYzine (ATARAX) 25 MG tablet TAKE 1-2 TABLETS BY MOUTH 2 TIMES DAILY AS NEEDED FOR ANXIETY  Yes Reported, Patient     Insulin Aspart, w/Niacinamide, (FIASP) 100 UNIT/ML SOLN 15 Units by Subcutaneous Infusion route daily Up to 85 units in continuous insulin pump  Yes Adriana Robles MD     Insulin Disposable Pump (OMNIPOD 5 G6 INTRO, GEN 5,) KIT 1 each continuous  Yes Adriana Robles MD     Insulin Disposable Pump (OMNIPOD 5 G6 POD, GEN 5,) MISC 1 each every 3 days  Yes Adriana Robles MD     insulin glargine (LANTUS PEN) 100 UNIT/ML pen Inject 6 units daily in the event of pump failure  Yes Adriana Robles MD Yes    INSULIN PUMP - OUTPATIENT Inject Subcutaneous continuous Date Last Updated:   Omnipod, type of insulin: Fiasp  Basal Rates in units/hr  5367-9544: 0.15   6515-4914: 0.25  ICF (insulin to carb ratio): 20 (1 unit covers 20g carbs)  ISF (insulin sensitivity factor): 140-170 (1 unit lowers BG by 140-170mg/dL)- very sernsitive  Target B-130 mg/dL  Active Insulin Time: 3 hours 2023 at am Yes Unknown, Entered By History     levothyroxine (SYNTHROID/LEVOTHROID) 150 MCG tablet Take 1 tablet (150 mcg) by mouth daily 2023 at am Yes Adriana Robles MD Yes    lipase-protease-amylase (VIOKACE) 43711-97980 units TABS tablet Take 5 with meals and 3 with snacks; approximately daily maximum of 20 capsules  6/30/2023 at pm Yes Adriana Robles MD No    loratadine (CLARITIN) 10 MG tablet Take 10 mg by mouth daily as needed   Yes Unknown, Entered By History     omeprazole (PRILOSEC) 40 MG DR capsule Take 1 capsule (40 mg) by mouth 2 times daily Take 30-60 minutes before a meal. 6/30/2023 at pm Yes Maryana Brooks MD No    potassium citrate (UROCIT-K) 10 MEQ (1080 MG) CR tablet Take 1 tablet (10 mEq) by mouth 3 times daily (with meals) 6/30/2023 at pm Yes Hannah Murcia MD     traZODone (DESYREL) 50 MG tablet Take 50 mg by mouth nightly as needed for sleep Past Week at hs Yes Unknown, Entered By History No    Continuous Blood Gluc Sensor (DEXCOM G6 SENSOR) MISC CHANGE EVERY 10 DAYS.   Adriana Robles MD     Continuous Blood Gluc Transmit (DEXCOM G6 TRANSMITTER) MISC CHANGE EVERY 3 MONTHS.   Adriana Robles MD     insulin pen needle (32G X 4 MM) 32G X 4 MM miscellaneous Use 4-6 pen needles daily to inject subcutaneous insulin   Adriana Robles MD No

## 2023-07-01 NOTE — PROGRESS NOTES
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    Doing well. No further fevers. Urine cx pending. Continue IV antibiotics for today and f/u cultures.    PLAN    Sepsis 2/2 Acute Pyelonephritis  -Presents with abdominal pain and fevers  -On adm, met SIRS criteria with evidence of ascending urinary tract infection as source  -Continue Zosyn for today and f/u cx  -Discontinue IVF    IDDM Type I w/ Insulin Pump  -Continue insulin pump use today    Pancreatic Islet Cell Transplant Status  -Not on immunosuppression as transplanted own islet cells    Non-Obstructing Kidney Stones  -No intervention given non-obstructing    DVT Prophy  -LMWH    Disposition  -Can likely discharge tomorrow to home      Venkatesh Toro MD    Subjective     Seen at bedside.  Still having generalized malaise but no fever since admission.  Mild left-sided abdominal pain.  Denies urinary symptoms currently.        Objective   Blood pressure (!) 102/36, pulse 68, temperature 98.7  F (37.1  C), temperature source Oral, resp. rate 18, height 1.626 m (5' 4\"), weight 56.7 kg (125 lb), last menstrual period 12/19/2013, SpO2 97 %, not currently breastfeeding.    PHYSICAL EXAM  General: In no acute distress  CV: RRR.  Lungs: CTAB. Nl WOB.  Abd: Mild left sided abdominal tenderness  Ext: No edema.    LABS AND IMAGING  Reviewed and pertinent results discussed in assessment and plan.   "

## 2023-07-01 NOTE — H&P
[facility]    History and Physical - Hospitalist Service       Date of Admission:  6/30/2023    Assessment & Plan      Lynn Thompson is a 58 year old female admitted on 6/30/2023. She is with past medical history of pancreatic islet transplant, chronic kidney stones presented to the ER with fever since Wednesday night.  They are gradually getting worse associated with fevers.  She has burning urination intermittently.  She denies hematuria, dysuria.  She noticed some left flank pain but she has history of chronic kidney stones in it.  She was doing 24-hour urine for her nephrologist.  She denies nausea, emesis, diarrhea, URI symptoms.  She is not on any antirejection medications.  In ER patient had fever of 102 and patient was tachycardic with heart rate of 122.  Patient blood work showed WBC of 13.3, slightly elevated procalcitonin 8.13.  Patient had CT  abdomen pelvis showed a sending urinary tract infection with early bilateral pyelonephritis.  Patient is getting admitted with pyelonephritis with sepsis.    Principal Problem:    Acute pyelonephritis    Assessment: Patient has left-sided flank pain.  Patient has history of left-sided kidney stones with multiple episodes of UTI, stent placement, lithotripsy.  Patient is also immunocompromised secondary to her eyelid transplant.    Plan: Urine culture, blood cultures ordered.  Ordered IV Zosyn every 6 hours.  Ordered IV fluids at 125 cc/h.  Active Problems:    Asplenia    Assessment: Patient is  in sepsis but  her vitals are stable and improving    Plan: Will monitor  Post pancreatectomy Diabetes  Assessment-and uses insulin pump  Plan: Will order Accu-Cheks with insulin sliding scale.       Diet:  Diabetic diet  DVT Prophylaxis: Pneumatic Compression Devices  Gonzalez Catheter: Not present  Lines: None     Cardiac Monitoring: None  Code Status:   FULL CODE      Disposition Plan      Expected Discharge Date: 07/02/2023                   Alba Dudley MD  Hospitalist Service  [facility]  Securely message with [employer] (more info)  Text page via [employer] Paging/Directory     ______________________________________________________________________    Chief Complaint   fever    History is obtained from the patient    History of Present Illness   Lynn Thompson is a 58 year old female with past medical history of pancreatic islet transplant, chronic kidney stones presented to the ER with fever since Wednesday night.  They are gradually getting worse associated with fevers.  She has burning urination intermittently.  She denies hematuria, dysuria.  She noticed some left flank pain but she has history of chronic kidney stones in it.  She was doing 24-hour urine for her nephrologist.  She denies nausea, emesis, diarrhea, URI symptoms.  She is not on any antirejection medications.  In ER patient had fever of 102 and patient was tachycardic with heart rate of 122.  Patient blood work showed WBC of 13.3, slightly elevated procalcitonin 8.13.  Patient had CT  abdomen pelvis showed a sending urinary tract infection with early bilateral pyelonephritis.  Patient is getting admitted with pyelonephritis with sepsis.      Past Medical History    Past Medical History:   Diagnosis Date     Benign paroxysmal positional vertigo     occ.      Calculus of kidney 05/2005    x1 on L side passed, several stones.  Has been tested for oxalate.     Chronic abdominal pain 07/17/2013     Chronic pancreatitis 07/17/2013     Depression     also occ panic spells     Diabetes (H) 5/10/2013    Total Pancreatomy with Auto Islets Transplant     Dyspepsia 06/1999    H. 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 Presley      SECTION       CHOLECYSTECTOMY       COLONOSCOPY N/A 2021    Procedure: COLONOSCOPY INCOMPLETE Aborted due to incomplete prep  will need to take additional prep and return tomorrow 21;  Surgeon: Ihsan Saenz MD;  Location: [facility]     COMBINED CYSTOSCOPY, RETROGRADES, URETEROSCOPY, LASER HOLMIUM LITHOTRIPSY URETER(S), INSERT STENT Right 2015    Procedure: COMBINED CYSTOSCOPY, RETROGRADES, URETEROSCOPY, LASER HOLMIUM LITHOTRIPSY URETER(S), INSERT STENT;  Surgeon: Kennedi Aldana MD;  Location: [facility]     COMBINED CYSTOSCOPY, RETROGRADES, URETEROSCOPY, LASER HOLMIUM LITHOTRIPSY URETER(S), INSERT STENT Right 2015    Procedure: COMBINED CYSTOSCOPY, RETROGRADES, URETEROSCOPY, LASER HOLMIUM LITHOTRIPSY URETER(S), INSERT STENT;  Surgeon: Kennedi Aldana MD;  Location: [facility]     COSMETIC SURGERY  2002    Tummy tuck     CYSTECTOMY OVARIAN BENIGN Right      CYSTOSCOPY, RETROGRADES, INSERT STENT URETER(S), COMBINED  10/02/2012    Procedure: COMBINED CYSTOSCOPY, RETROGRADES, INSERT STENT URETER(S);  COMBINED CYSTOSCOPY,  , INSERT LEFT STENT URETER;  Surgeon: Johny Baez MD;  Location: [facility]     CYSTOSCOPY, RETROGRADES, INSERT STENT URETER(S), COMBINED Left 2022    Procedure: Cystoscopy with left retrograde pyelogram, left ureteroscopy, thulium laser lithotripsy of left ureteral calculus, stone basketing and left ureteral stent insertion;  Surgeon: Alonzo Rome MD;  Location: [facility]     ESOPHAGOSCOPY, GASTROSCOPY, DUODENOSCOPY (EGD), COMBINED N/A 2018    Procedure: COMBINED ESOPHAGOSCOPY, GASTROSCOPY,  DUODENOSCOPY (EGD);  ESOPHAGOSCOPY, GASTROSCOPY, DUODENOSCOPY (EGD)    ;  Surgeon: Tamir Rodgers MD;  Location: [facility]     EXTRACORPOREAL SHOCK WAVE LITHOTRIPSY (ESWL)  10/16/2012    Procedure: EXTRACORPOREAL SHOCK WAVE LITHOTRIPSY (ESWL);  left EXTRACORPOREAL SHOCK WAVE LITHOTRIPSY (ESWL) ;  Surgeon: Johny Baez MD;  Location: [facility]     Gastric bypass NOS       HERNIA REPAIR  02/2015     HYSTERECTOMY SUPRACERVICAL, BILATERAL SALPINGO-OOPHORECTOMY, COMBINED N/A 02/01/2022    Procedure: Abdominal supracervical hysterectomy, bilateral salpingooophrectomy;  Surgeon: Nicole Rivera MD;  [facility]     IRRIGATION AND DEBRIDEMENT HAND, COMBINED Left 10/30/2020    Procedure: Left hand sharp excisional debridement of skin, subcutaneous tissue and fat with a scalpel, 2 x 1 x 1 cm.;  Surgeon: Demian Renteria MD;  Location: [facility]     LAPAROSCOPIC LYSIS ADHESIONS N/A 02/20/2015    Procedure: LAPAROSCOPIC LYSIS ADHESIONS;  Surgeon: Aaron Early MD;  [facility]     LAPAROSCOPIC LYSIS ADHESIONS N/A 12/29/2015    Procedure: LAPAROSCOPIC LYSIS ADHESIONS;  Surgeon: Aaron Early MD;  [facility]     PANCREATECTOMY, TRANSPLANT AUTO ISLET CELL, COMBINED  05/10/2013    Procedure: COMBINED PANCREATECTOMY, TRANSPLANT AUTO ISLET CELL;  Pancreatectomy, Auto Islet Cell Transplant   hernia repair, jejunostomy tube and liver biopsies with Anesthesia General with block;  Surgeon: Aaron Early MD;  [facility]     Partial ileum resection  1992     RECTOPEXY ABDOMINAL N/A 02/01/2022    Procedure: RECTOPEXY, ABDOMINAL;  Surgeon: Uriah Sheridan MD;  [facility]     REPAIR PTOSIS BROW BILATERAL Bilateral 06/09/2020    Procedure: BILATERAL BROW PTOSIS REPAIR;  Surgeon: Denise Alberts MD;  Location: [facility]     SACROCOLPOPEXY, CYSTOSCOPY, COMBINED N/A 02/01/2022    Procedure: Uterosacral ligament suspension, pina colposuspension with Cystoscopy;  Surgeon: Nicole Rivera  MD;  [facility]     SIGMOIDOSCOPY FLEXIBLE N/A 2022    Procedure: SIGMOIDOSCOPY, FLEXIBLE;  Surgeon: Uriah Sheridan MD;  [facility]     Surgery for SBO       TONSILLECTOMY, ADENOIDECTOMY, COMBINED  1997     TRANSPLANT  5/10/13    Pancreatic Auto-Islet Transplant       Prior to Admission Medications   Prior to Admission Medications   Prescriptions Last Dose Informant Patient Reported? Taking?   Continuous Blood Gluc Sensor (DEXCOM G6 SENSOR) MISC   No No   Sig: CHANGE EVERY 10 DAYS.   Continuous Blood Gluc Transmit (DEXCOM G6 TRANSMITTER) MISC   No No   Sig: CHANGE EVERY 3 MONTHS.   DULoxetine (CYMBALTA) 20 MG capsule   Yes No   Sig: Take 20 mg by mouth daily   FIASP PENFILL 100 UNIT/ML SOCT   No No   Sig: Inject 0.5-4 Units Subcutaneous 4 times daily, INJECT with meals and nightly   Glucagon (GVOKE HYPOPEN 1-PACK) 1 MG/0.2ML SOAJ   No No   Sig: Inject 1 mg Subcutaneous once as needed (for hypoglycemia with loss of consciousness)   Insulin Aspart, w/Niacinamide, (FIASP) 100 UNIT/ML SOLN   No No   Sig: 15 Units by Subcutaneous Infusion route daily Up to 85 units in continuous insulin pump   Insulin Disposable Pump (OMNIPOD 5 G6 INTRO, GEN 5,) KIT   No No   Si each continuous   Insulin Disposable Pump (OMNIPOD 5 G6 POD, GEN 5,) MISC   No No   Si each every 3 days   calcium carbonate 600 mg-vitamin D 400 units (CALTRATE) 600-400 MG-UNIT per tablet   Yes No   Sig: Take 1 tablet by mouth daily   escitalopram (LEXAPRO) 20 MG tablet   Yes No   Sig: Take 20 mg by mouth daily   estradiol (ESTRACE) 0.1 MG/GM vaginal cream   No No   Sig: Place vaginally twice a week PLACE 2 GRAMS VAGINALLY 2 TIMES WEEKLY   famotidine (PEPCID) 40 MG tablet   No No   Sig: Take 1 tablet (40 mg) by mouth 2 times daily as needed for heartburn   gabapentin (NEURONTIN) 300 MG capsule   Yes No   Sig: Take 300 mg by mouth nightly as needed   glucose 40 % GEL  Self No No   Sig: Take 15-30 g by mouth every 15 minutes  as needed.   hydrOXYzine (ATARAX) 25 MG tablet   Yes No   Sig: TAKE 1-2 TABLETS BY MOUTH 2 TIMES DAILY AS NEEDED FOR ANXIETY   insulin glargine (LANTUS PEN) 100 UNIT/ML pen   No No   Sig: Inject 6 units daily in the event of pump failure   insulin pen needle (32G X 4 MM) 32G X 4 MM miscellaneous   No No   Sig: Use 4-6 pen needles daily to inject subcutaneous insulin   levothyroxine (SYNTHROID/LEVOTHROID) 150 MCG tablet   No No   Sig: Take 1 tablet (150 mcg) by mouth daily   lipase-protease-amylase (VIOKACE) 83828-38338 units TABS tablet   No No   Sig: Take 5 with meals and 3 with snacks; approximately daily maximum of 20 capsules   loratadine (CLARITIN) 10 MG tablet  Self Yes No   Sig: Take 10 mg by mouth daily as needed    modafinil (PROVIGIL) 200 MG tablet   Yes No   Sig: Take 400 mg by mouth daily   omeprazole (PRILOSEC) 40 MG DR capsule   No No   Sig: Take 1 capsule (40 mg) by mouth 2 times daily Take 30-60 minutes before a meal.   potassium citrate (UROCIT-K) 10 MEQ (1080 MG) CR tablet   No No   Sig: Take 1 tablet (10 mEq) by mouth 3 times daily (with meals)   traMADol (ULTRAM) 50 MG tablet   Yes No   traZODone (DESYREL) 50 MG tablet   Yes No   Sig: Take 50 mg by mouth nightly as needed for sleep      Facility-Administered Medications: None        Review of Systems    The 10 point Review of Systems is negative other than noted in the HPI     Social History   I have reviewed this patient's social history and updated it with pertinent information if needed.  Social History     Tobacco Use     Smoking status: Never     Smokeless tobacco: Never   Vaping Use     Vaping Use: Never used   Substance Use Topics     Alcohol use: Not Currently     Drug use: Not Currently       Family History   I have reviewed this patient's family history and updated it with pertinent information if needed.  Family History   Problem Relation Age of Onset     Family History Negative Mother      Respiratory Father         COPD;  at 69      Genitourinary Problems Father         kidney stones     Substance Abuse Father      Depression Father      Asthma Father      Heart Disease Paternal Grandfather         M.I.     Coronary Artery Disease Paternal Grandfather      Hyperlipidemia Paternal Grandfather      Genitourinary Problems Brother         multiple brothers with kidney stones     Gastrointestinal Disease Maternal Grandmother         undiagnosed 'gut' issues     Coronary Artery Disease Maternal Grandfather      Hyperlipidemia Maternal Grandfather      Cerebrovascular Disease Paternal Grandmother         At the age of 103     Anxiety Disorder Paternal Grandmother      Osteoporosis Paternal Grandmother      Anxiety Disorder Son      Anxiety Disorder Daughter      Asthma Daughter      Colon Cancer No family hx of        Allergies   Allergies   Allergen Reactions     Corticosteroids Other (See Comments)     All oral, IV and injectable steroids are contraindicated (unless in life threatening situations) in Islet Auto transplant recipients. They can cause irreversible loss of islet cell function. Please contact patient's transplant care coordinator, Erlinda Multani RN BSN at [phone]/pager: [phone] and endocrinologist prior to administration.       Povidone Iodine Hives     Causes skin to blister     Nsaids      naprosyn = GI upset     Sulfasalazine Nausea and Nausea and Vomiting        Physical Exam   Vital Signs: Temp: 100.2  F (37.9  C) Temp src: Temporal BP: 105/46 Pulse: 70   Resp: 18 SpO2: 94 % O2 Device: None (Room air)    Weight: 0 lbs 0 oz    Constitutional: awake, alert, cooperative, no apparent distress, and appears stated age  Eyes: Lids and lashes normal, pupils equal, round and reactive to light, extra ocular muscles intact, sclera clear, conjunctiva normal  ENT: Normocephalic, without obvious abnormality, atraumatic, sinuses nontender on palpation, external ears without lesions, oral pharynx with moist mucous membranes,  tonsils without erythema or exudates, gums normal and good dentition.  Respiratory: No increased work of breathing, good air exchange, clear to auscultation bilaterally, no crackles or wheezing  Cardiovascular: Normal apical impulse, regular rate and rhythm, normal S1 and S2, no S3 or S4, and no murmur noted  GI: Soft, nondistended, bowel sounds positive, left-sided flank tenderness, CVA tenderness noted.  Neurologic: Awake, alert, oriented to name, place and time.  Cranial nerves II-XII are grossly intact.  Motor is 5 out of 5 bilaterally.  Cerebellar finger to nose, heel to shin intact.  Sensory is intact.  Babinski down going, Romberg negative, and gait is normal.    Medical Decision Making       75 MINUTES SPENT BY ME on the date of service doing chart review, history, exam, documentation & further activities per the note.      Data     I have personally reviewed the following data over the past 24 hrs:    13.3 (H)  \   10.7 (L)   / 416     135 (L) 100 22.5 /  338 (H)   4.2 24 0.97 (H) \       ALT: 21 AST: 23 AP: 132 (H) TBILI: 0.3   ALB: 3.8 TOT PROTEIN: 6.9 LIPASE: N/A       Procal: 0.13 (H) CRP: N/A Lactic Acid: 1.4         Imaging results reviewed over the past 24 hrs:   Recent Results (from the past 24 hour(s))   CT Abdomen Pelvis w Contrast    Narrative    EXAM: CT ABDOMEN PELVIS W CONTRAST  LOCATION: [facility]  DATE: 6/30/2023    INDICATION: Left flank pain. Urinary tract infection. Fever. Sepsis.  COMPARISON: CT abdomen pelvis 11/23/2021.  TECHNIQUE: CT scan of the abdomen and pelvis was performed following injection of IV contrast. Multiplanar reformats were obtained. Dose reduction techniques were used.  CONTRAST: 63mL Isovue 370    FINDINGS:   LOWER CHEST: Normal.    HEPATOBILIARY: Status post cholecystectomy and hepaticojejunostomy with unchanged expected pneumobilia. No liver lesions.     PANCREAS: Surgically absent.    SPLEEN: Surgically absent.    ADRENAL GLANDS:  Normal.    KIDNEYS/BLADDER: Mild cortical scarring at the left upper renal pole. Few faint patchy hypoenhancing areas in the left upper renal pole and right mid to upper pole, suspicious for pyelonephritis. No renal abscess. Diffuse urothelial thickening and   enhancement throughout both renal pelves and ureters. Cluster of nonobstructing calculi at the left lower pole, the largest of which measures 4 mm. Nonobstructing 2 mm right lower pole renal calculus. No ureteral calculi or hydronephrosis. Mild diffuse   urinary bladder wall thickening with mild mucosal enhancement.    BOWEL: Status post gastric bypass and ileocecectomy. No evidence of bowel obstruction or inflammation. Moderate amount of stool within the ascending colon. Small amount of stool within the descending and proximal sigmoid colon.    LYMPH NODES: No enlarged lymph nodes. Multiple prominent but nonenlarged lymph nodes are present within the central mesentery, similar to prior.    VASCULATURE: Patent portal and superior mesenteric veins. Nonaneurysmal abdominal aorta.    PELVIC ORGANS: Uterus is absent.    MUSCULOSKELETAL: No aggressive bone lesions or acute bony abnormality.      Impression    IMPRESSION:     1.  Ascending urinary tract infection with cystitis, bilateral ureteritis and pyelitis, and early bilateral pyelonephritis. No renal abscess.    2.  Bilateral nonobstructing intrarenal calculi measuring up to 4 mm on the left. No hydronephrosis.     Recent Labs   Lab 06/30/23 2019   WBC 13.3*   HGB 10.7*   MCV 92      *   POTASSIUM 4.2   CHLORIDE 100   CO2 24   BUN 22.5   CR 0.97*   ANIONGAP 11   VALARIE 9.1   *   ALBUMIN 3.8   PROTTOTAL 6.9   BILITOTAL 0.3   ALKPHOS 132*   ALT 21   AST 23

## 2023-07-01 NOTE — PLAN OF CARE
To Do:  End of Shift Summary  For vital signs and complete assessments, please see documentation flowsheets.     Pertinent assessments: AxOx4. VSS on RA. Flank pain controled with prn oxy& tylenol. IV SL. Pt. self monitoring BG with insulin pump. BS @ 1100, 226.  Denies nausea.      Major Shift Events: Continue Zosyn, Discontinued IVF     Treatment Plan: ABX, Pain control, DVT prophylaxis, possible discharge tommorow.      Bedside Nurse: Adair Hudson RN

## 2023-07-01 NOTE — ED TRIAGE NOTES
"Patient reports ongoing fevers since 6/29.  She reports max temperature of 102.5, last tylenol taken at 1500 per pt report.  She reports she also feels she has \"kidney pain\" and feels like \"she got hit by a bus\".  History of kidney stones, DM, pancreas and spleen removal.  Tachycardic in triage.  ABCs intact, A&Ox4.     Triage Assessment     Row Name 06/30/23 1933       Triage Assessment (Adult)    Airway WDL WDL       Respiratory WDL    Respiratory WDL WDL       Skin Circulation/Temperature WDL    Skin Circulation/Temperature WDL WDL       Cardiac WDL    Cardiac WDL X;rhythm    Pulse Rate & Regularity tachycardic       Peripheral/Neurovascular WDL    Peripheral Neurovascular WDL WDL       Cognitive/Neuro/Behavioral WDL    Cognitive/Neuro/Behavioral WDL WDL              "

## 2023-07-01 NOTE — PROVIDER NOTIFICATION
Notified MD that patient has implanted glucose monitor & insulin pump in place. RBG is 230 & pt declined to correct the value, although her basal pump is still running. Requested Rx consult to manage pump & document accordingly. If pt is to manage her pump, requested s/s order set to be stopped.   X-cover call back. Ok to let patient manage tonight & Rx consult will be requested by day rounder via sticky note.

## 2023-07-01 NOTE — ED PROVIDER NOTES
History     Chief Complaint:  Fatigue and Fever       The history is provided by the patient.      Lynn Thompson is a 58 year old female with history of total pancreatectomy, islet cell autotransplant, splenectomy, liver biopsy (needle core), choledochoduodenostomy, feeding jejunostomy who presents with intermittent left-sided back pain since 2 days ago and fever and chills since last night.  The back pain does not radiate to the abdomen.  She is unsure if this is consistent with a kidney stone or kidney infection.  She reports having dysuria at times but not always.  She has been drinking a lot of water, causing her to urinate frequently.  Last night, she had a fever (T-max:).  She took Tylenol prior to bed.  She woke up diaphoretic in the middle of the night but her temperature did improve.  She continued to have chills without fever today.  She last took Tylenol approximately 5 hours ago.  She also notes having mild diarrhea and a headache without neck pain or stiffness.  She further denies vomiting, cough, shortness of breath, chest pain, and other URI symptoms.      Independent Historian:   None - Patient Only    Review of External Notes:   none    Medications:    Cymbalta  Lexapro  Estrace  Pepcid  Gabapentin   Hydroxyzine   Levothyroxine   Viokace  Claritin  Provigil  Prilosec  Desyrel   Tramadol     Past Medical History:    Depression  Chronic pancreatitis  Hypertension  BLU  Anxiety  Hypothyroidism  Post-pancreatectomy diabetes  SBO     Past Surgical History:    Abdominoplasty  Appendectomy  Bunionectomy  CBD stent placement   section  Cholecystectomy  Colonoscopy  Combined cystoscopy, retrogrades, ureteroscopy, laser holmium lithotripsy ureter, insert stent, right  Cystectomy ovarian, right  EGD  Extracorporeal shock wave lithotripsy  Hernia repair  Hysterectomy supracervical   BSO  I&D hand, left  Lysis adhesions  Pancreatectomy, transplant auto islet cell  Partial ileum resection  Rectopexy  "abdominal  Repair ptosis brow bilateral  Sacrocolpopexy, cystoscopy, combined  Sigmoidoscopy   Tonsillectomy  Adenoidectomy     Physical Exam     Patient Vitals for the past 24 hrs:   BP Temp Temp src Pulse Resp SpO2 Height   06/30/23 1932 132/66 100.2  F (37.9  C) Temporal (!) 122 18 97 % 1.626 m (5' 4\")        Physical Exam  Nursing note and vitals reviewed.  Constitutional:  Appears well-developed and well-nourished.   HENT:   Head:    Atraumatic.   Mouth/Throat:   Oropharynx is clear and moist. No oropharyngeal exudate.   Eyes:    Pupils are equal, round, and reactive to light.   Neck:    Normal range of motion. Neck supple.      No tracheal deviation present. No thyromegaly present.   Cardiovascular:  Normal rate, regular rhythm, no murmur   Pulmonary/Chest: Breath sounds are clear and equal without wheezes or crackles.  Abdominal:   Soft. Bowel sounds are normal. Exhibits no distension and      no mass. There is no tenderness.      There is no rebound and no guarding.   Back:    Positive left CVA tenderness.  Musculoskeletal:  Exhibits no edema.   Lymphadenopathy:  No cervical adenopathy.   Neurological:   Alert and oriented to person, place, and time.   Skin:    Skin is hot and dry. No rash noted. No pallor.      Emergency Department Course   ECG  ECG taken at 2052, ECG read at 2149  Normal sinus rhythm  Normal ECG   Rate 96 bpm. CA interval 138 ms. QRS duration 72 ms. QT/QTc 350/442 ms. P-R-T axes 60 52 53.     Imaging:  CT Abdomen Pelvis w Contrast   Final Result   IMPRESSION:       1.  Ascending urinary tract infection with cystitis, bilateral ureteritis and pyelitis, and early bilateral pyelonephritis. No renal abscess.      2.  Bilateral nonobstructing intrarenal calculi measuring up to 4 mm on the left. No hydronephrosis.         Report per radiology    Laboratory:  Labs Ordered and Resulted from Time of ED Arrival to Time of ED Departure   ROUTINE UA WITH MICROSCOPIC REFLEX TO CULTURE - Abnormal       " Result Value    Color Urine Yellow      Appearance Urine Slightly Cloudy (*)     Glucose Urine 1000 (*)     Bilirubin Urine Negative      Ketones Urine Negative      Specific Gravity Urine 1.009      Blood Urine Trace (*)     pH Urine 5.5      Protein Albumin Urine 30 (*)     Urobilinogen Urine Normal      Nitrite Urine Negative      Leukocyte Esterase Urine Large (*)     Bacteria Urine Many (*)     RBC Urine 3 (*)     WBC Urine >182 (*)     Squamous Epithelials Urine 1     COMPREHENSIVE METABOLIC PANEL - Abnormal    Sodium 135 (*)     Potassium 4.2      Chloride 100      Carbon Dioxide (CO2) 24      Anion Gap 11      Urea Nitrogen 22.5      Creatinine 0.97 (*)     Calcium 9.1      Glucose 338 (*)     Alkaline Phosphatase 132 (*)     AST 23      ALT 21      Protein Total 6.9      Albumin 3.8      Bilirubin Total 0.3      GFR Estimate 67     PROCALCITONIN - Abnormal    Procalcitonin 0.13 (*)    CBC WITH PLATELETS AND DIFFERENTIAL - Abnormal    WBC Count 13.3 (*)     RBC Count 3.76 (*)     Hemoglobin 10.7 (*)     Hematocrit 34.4 (*)     MCV 92      MCH 28.5      MCHC 31.1 (*)     RDW 13.5      Platelet Count 416      % Neutrophils 72      % Lymphocytes 11      % Monocytes 14      % Eosinophils 1      % Basophils 1      % Immature Granulocytes 1      NRBCs per 100 WBC 0      Absolute Neutrophils 9.8 (*)     Absolute Lymphocytes 1.4      Absolute Monocytes 1.9 (*)     Absolute Eosinophils 0.1      Absolute Basophils 0.1      Absolute Immature Granulocytes 0.1      Absolute NRBCs 0.0     LACTIC ACID WHOLE BLOOD - Normal    Lactic Acid 1.4     INFLUENZA A/B, RSV, & SARS-COV2 PCR   BLOOD CULTURE   BLOOD CULTURE   URINE CULTURE        Emergency Department Course & Assessments:    Interventions:  Medications   HYDROmorphone (PF) (DILAUDID) injection 0.5 mg (has no administration in time range)   0.9% sodium chloride BOLUS (2,000 mLs Intravenous $New Bag 6/30/23 2046)   piperacillin-tazobactam (ZOSYN) intermittent infusion  4.5 g (4.5 g Intravenous $New Bag 6/30/23 2048)   acetaminophen (TYLENOL) tablet 650 mg (650 mg Oral $Given 6/30/23 2038)   iopamidol (ISOVUE-370) solution 500 mL (63 mLs Intravenous $Given 6/30/23 2111)   CT scan flush (56 mLs Intravenous $Given 6/30/23 2111)        Assessments:  2005 Initial assessment. I gathered history and examined the patient as noted above.   2128 I rechecked the patient and explained findings.   2147 I rechecked the patient and explained findings.    Independent Interpretation (X-rays, CTs, rhythm strip):  None    Consultations/Discussion of Management or Tests:  2229 I consulted with Dr. Dudley, hospitalist, regarding the patient's history and presentation here in the emergency department who accepted the patient for admission.     Social Determinants of Health affecting care:   None    Disposition:  The patient was admitted to the hospital under the care of Dr. Dudley.     Impression & Plan      Medical Decision Making:  I found this patient have acute sepsis syndrome due to acute bilateral pyelonephritis.  Her care is complicated by the fact that she does not have a spleen, reports she was given Zosyn IV, IV fluids, antipyretics and admitted to the care of the hospitalist service for further evaluation treatment.  I initially considered the possibility of an infected kidney stone however there is no sign of ureteral calculus or renal obstruction at this time.  She is not having any symptoms concerning for pneumonia or other intra-abdominal process.    Diagnosis:    ICD-10-CM    1. Acute sepsis (H)  A41.9       2. Acute pyelonephritis  N10       3. Asplenia  Q89.01            Discharge Medications:  New Prescriptions    No medications on file          Scribe Disclosure:  I, Bell Hester, am serving as a scribe at 7:54 PM on 6/30/2023 to document services personally performed by Dayan Rock MD based on my observations and the provider's statements to me.     6/30/2023    Dayan Rock MD [place], Cheri Lee, MD  06/30/23 5329

## 2023-07-01 NOTE — PLAN OF CARE
Negative assessment except ALIZA flank pain, temp 100.2   Ind in room, IVF infusing, Zosyn Q6*.     Major Shift Events   Oxycodone x1 for pain, tylenol x1 for 100.2. Slept between cares.     Treatment Plan:   Zosyn, IVF, prn pain meds. Monitor for fever or other s/s sepsis.

## 2023-07-02 LAB — BACTERIA UR CULT: ABNORMAL

## 2023-07-02 PROCEDURE — 250N000013 HC RX MED GY IP 250 OP 250 PS 637: Performed by: INTERNAL MEDICINE

## 2023-07-02 PROCEDURE — 250N000011 HC RX IP 250 OP 636: Performed by: HOSPITALIST

## 2023-07-02 PROCEDURE — 120N000001 HC R&B MED SURG/OB

## 2023-07-02 PROCEDURE — 250N000011 HC RX IP 250 OP 636: Mod: JZ | Performed by: INTERNAL MEDICINE

## 2023-07-02 PROCEDURE — 99232 SBSQ HOSP IP/OBS MODERATE 35: CPT | Performed by: INTERNAL MEDICINE

## 2023-07-02 RX ORDER — FAMOTIDINE 20 MG/1
40 TABLET, FILM COATED ORAL 2 TIMES DAILY PRN
Status: DISCONTINUED | OUTPATIENT
Start: 2023-07-02 | End: 2023-07-03 | Stop reason: HOSPADM

## 2023-07-02 RX ORDER — PANTOPRAZOLE SODIUM 40 MG/1
40 TABLET, DELAYED RELEASE ORAL 2 TIMES DAILY
Status: DISCONTINUED | OUTPATIENT
Start: 2023-07-02 | End: 2023-07-03 | Stop reason: HOSPADM

## 2023-07-02 RX ORDER — GABAPENTIN 300 MG/1
300 CAPSULE ORAL
Status: DISCONTINUED | OUTPATIENT
Start: 2023-07-02 | End: 2023-07-03 | Stop reason: HOSPADM

## 2023-07-02 RX ORDER — PANTOPRAZOLE SODIUM 40 MG/1
40 TABLET, DELAYED RELEASE ORAL 2 TIMES DAILY
Status: DISCONTINUED | OUTPATIENT
Start: 2023-07-02 | End: 2023-07-02

## 2023-07-02 RX ORDER — CEFTRIAXONE 2 G/1
2 INJECTION, POWDER, FOR SOLUTION INTRAMUSCULAR; INTRAVENOUS EVERY 24 HOURS
Status: DISCONTINUED | OUTPATIENT
Start: 2023-07-02 | End: 2023-07-03 | Stop reason: HOSPADM

## 2023-07-02 RX ORDER — ESCITALOPRAM OXALATE 20 MG/1
20 TABLET ORAL DAILY
Status: DISCONTINUED | OUTPATIENT
Start: 2023-07-02 | End: 2023-07-03 | Stop reason: HOSPADM

## 2023-07-02 RX ORDER — LORATADINE 10 MG/1
10 TABLET ORAL DAILY PRN
Status: DISCONTINUED | OUTPATIENT
Start: 2023-07-02 | End: 2023-07-03 | Stop reason: HOSPADM

## 2023-07-02 RX ORDER — LEVOTHYROXINE SODIUM 150 UG/1
150 TABLET ORAL DAILY
Status: DISCONTINUED | OUTPATIENT
Start: 2023-07-02 | End: 2023-07-03 | Stop reason: HOSPADM

## 2023-07-02 RX ORDER — DULOXETIN HYDROCHLORIDE 20 MG/1
20 CAPSULE, DELAYED RELEASE ORAL DAILY
Status: DISCONTINUED | OUTPATIENT
Start: 2023-07-02 | End: 2023-07-03 | Stop reason: HOSPADM

## 2023-07-02 RX ORDER — HYDROXYZINE HYDROCHLORIDE 25 MG/1
25 TABLET, FILM COATED ORAL 3 TIMES DAILY PRN
Status: DISCONTINUED | OUTPATIENT
Start: 2023-07-02 | End: 2023-07-03 | Stop reason: HOSPADM

## 2023-07-02 RX ORDER — TRAZODONE HYDROCHLORIDE 50 MG/1
50 TABLET, FILM COATED ORAL
Status: DISCONTINUED | OUTPATIENT
Start: 2023-07-02 | End: 2023-07-03 | Stop reason: HOSPADM

## 2023-07-02 RX ADMIN — LEVOTHYROXINE SODIUM 150 MCG: 150 TABLET ORAL at 10:47

## 2023-07-02 RX ADMIN — ESCITALOPRAM OXALATE 20 MG: 20 TABLET, FILM COATED ORAL at 10:47

## 2023-07-02 RX ADMIN — PANCRELIPASE 3 TABLET: 20880; 78300; 78300 TABLET ORAL at 12:06

## 2023-07-02 RX ADMIN — DULOXETINE HYDROCHLORIDE 20 MG: 20 CAPSULE, DELAYED RELEASE ORAL at 10:47

## 2023-07-02 RX ADMIN — ENOXAPARIN SODIUM 40 MG: 40 INJECTION SUBCUTANEOUS at 10:47

## 2023-07-02 RX ADMIN — CEFTRIAXONE 2 G: 2 INJECTION, POWDER, FOR SOLUTION INTRAMUSCULAR; INTRAVENOUS at 08:31

## 2023-07-02 RX ADMIN — PANTOPRAZOLE SODIUM 40 MG: 40 TABLET, DELAYED RELEASE ORAL at 21:29

## 2023-07-02 RX ADMIN — PANTOPRAZOLE SODIUM 40 MG: 40 TABLET, DELAYED RELEASE ORAL at 10:47

## 2023-07-02 RX ADMIN — PIPERACILLIN SODIUM AND TAZOBACTAM SODIUM 3.38 G: 3; .375 INJECTION, SOLUTION INTRAVENOUS at 02:41

## 2023-07-02 ASSESSMENT — ACTIVITIES OF DAILY LIVING (ADL)
ADLS_ACUITY_SCORE: 24

## 2023-07-02 NOTE — PLAN OF CARE
VSS on room air. Afebrile. A&Ox4. Tolerating a regular diet. Up independently. PRN Oxycodone given x1 for flank pain. Manages BG with own insulin pump & monitor.     Major Shift Events:   Hypoglycemic at ~0430- 60s, drank juice, recovered well 160s. Pt managed with monitor/pump.     Treatment Plan:   IV Zosyn, pain management, BG monitoring per patient.

## 2023-07-02 NOTE — PROGRESS NOTES
Notified that patient with positive blood cultures with GNR in 1/2 bottles. Verigene pending. UCX from admission with E.coli and patient already on zosyn. Will have repeat BCx drawn today, as well.    Zachary Aguirre MD  Hospitalist

## 2023-07-02 NOTE — PROGRESS NOTES
"[facility]  Hospitalist Progress Note     Assessment & Plan     ASSESSMENT    58F with hx of pancreatic islet cell transplant and IDDM Type I on insulin pump presents with abdominal pain and fevers and found to be septic with +UA and evidence of cystitis and bilateral pyelonephritis on imaging.  Both urine culture and blood cultures have returned with E. coli.    Clinically improving, however, blood culture with E. coli so we will need to stay today to ensure that blood cultures clear and fever curve comes down.    PLAN    Sepsis 2/2 E Coli Pyelonephritis w/ E Coli Bacteremia  -Presents with abdominal pain and fevers  -On adm, met SIRS criteria with evidence of ascending urinary tract infection as source  -Both urine culture and blood cultures have returned with E. Coli  -Repeat blood cx pending  -Zosyn --> Ceftriaxone 2g q24    IDDM Type I w/ Insulin Pump  -Continue insulin pump use today    Pancreatic Islet Cell Transplant Status  -Not on immunosuppression as transplanted own islet cells    Non-Obstructing Kidney Stones  -No intervention given non-obstructing    Hyponatremia  -Mild and resolved with IVF    DVT Prophy  -LMWH    Disposition  -Can likely discharge tomorrow to home if no further fevers and blood cultures stay negative      Venkatesh Toro MD    Subjective     Blood cultures returned yesterday evening with E. coli.  Repeat blood cultures drawn.  Patient switched to ceftriaxone this morning.  Had low-grade fever as well.  Overall feeling improved.        Objective   Blood pressure 120/42, pulse 83, temperature 99.5  F (37.5  C), temperature source Oral, resp. rate 14, height 1.626 m (5' 4\"), weight 56.7 kg (125 lb), last menstrual period 12/19/2013, SpO2 96 %, not currently breastfeeding.    PHYSICAL EXAM  General: In no acute distress  CV: RRR.  Lungs: CTAB. Nl WOB.  Abd: Mild left sided abdominal tenderness  Ext: No edema.    LABS AND IMAGING  Reviewed and pertinent results discussed in " assessment and plan.

## 2023-07-02 NOTE — PLAN OF CARE
Goal Outcome Evaluation:      Plan of Care Reviewed With: patient    Overall Patient Progress: improvingOverall Patient Progress: improving     Pertinent assessments: T max 99.5, otherwise vss. Denied pain. Up ind. Bg/ insulin managed per pt w/ insulin pump and monitor.     Major Shift Events: Antibiotic changed to rocephin    Treatment Plan: IV Rocephin, pain management, BG monitoring per patient.    Bedside Nurse: Jimmy Jiang RN

## 2023-07-02 NOTE — PLAN OF CARE
Pertinent assessments: VSS on room air. Afebrile. A&Ox4. Tolerating a regular diet. Up independently. PRN Oxycodone given x1 for flank pain. Manages BG and monitors BG with own insulin pump.    Major Shift Events: + blood culture results, MD notified.    Treatment Plan: IV Zosyn, pain management, BG monitoring.    Bedside Nurse: Mando Torres RN

## 2023-07-03 VITALS
DIASTOLIC BLOOD PRESSURE: 41 MMHG | RESPIRATION RATE: 16 BRPM | BODY MASS INDEX: 21.34 KG/M2 | HEART RATE: 74 BPM | OXYGEN SATURATION: 98 % | TEMPERATURE: 98.4 F | SYSTOLIC BLOOD PRESSURE: 106 MMHG | HEIGHT: 64 IN | WEIGHT: 125 LBS

## 2023-07-03 LAB
ANION GAP SERPL CALCULATED.3IONS-SCNC: 7 MMOL/L (ref 7–15)
ATRIAL RATE - MUSE: 96 BPM
BACTERIA BLD CULT: ABNORMAL
BACTERIA BLD CULT: ABNORMAL
BUN SERPL-MCNC: 13.4 MG/DL (ref 8–23)
CALCIUM SERPL-MCNC: 9.2 MG/DL (ref 8.8–10.2)
CHLORIDE SERPL-SCNC: 106 MMOL/L (ref 98–107)
CREAT SERPL-MCNC: 0.88 MG/DL (ref 0.51–0.95)
DEPRECATED HCO3 PLAS-SCNC: 28 MMOL/L (ref 22–29)
DIASTOLIC BLOOD PRESSURE - MUSE: NORMAL MMHG
ERYTHROCYTE [DISTWIDTH] IN BLOOD BY AUTOMATED COUNT: 13.6 % (ref 10–15)
GFR SERPL CREATININE-BSD FRML MDRD: 75 ML/MIN/1.73M2
GLUCOSE SERPL-MCNC: 130 MG/DL (ref 70–99)
HCT VFR BLD AUTO: 33.5 % (ref 35–47)
HGB BLD-MCNC: 10.8 G/DL (ref 11.7–15.7)
INTERPRETATION ECG - MUSE: NORMAL
MCH RBC QN AUTO: 29 PG (ref 26.5–33)
MCHC RBC AUTO-ENTMCNC: 32.2 G/DL (ref 31.5–36.5)
MCV RBC AUTO: 90 FL (ref 78–100)
P AXIS - MUSE: 60 DEGREES
PLATELET # BLD AUTO: 478 10E3/UL (ref 150–450)
POTASSIUM SERPL-SCNC: 4.1 MMOL/L (ref 3.4–5.3)
PR INTERVAL - MUSE: 138 MS
QRS DURATION - MUSE: 72 MS
QT - MUSE: 350 MS
QTC - MUSE: 442 MS
R AXIS - MUSE: 52 DEGREES
RBC # BLD AUTO: 3.73 10E6/UL (ref 3.8–5.2)
SODIUM SERPL-SCNC: 141 MMOL/L (ref 136–145)
SYSTOLIC BLOOD PRESSURE - MUSE: NORMAL MMHG
T AXIS - MUSE: 53 DEGREES
VENTRICULAR RATE- MUSE: 96 BPM
WBC # BLD AUTO: 8.6 10E3/UL (ref 4–11)

## 2023-07-03 PROCEDURE — 250N000011 HC RX IP 250 OP 636: Mod: JZ | Performed by: INTERNAL MEDICINE

## 2023-07-03 PROCEDURE — 250N000013 HC RX MED GY IP 250 OP 250 PS 637: Performed by: INTERNAL MEDICINE

## 2023-07-03 PROCEDURE — 36415 COLL VENOUS BLD VENIPUNCTURE: CPT | Performed by: INTERNAL MEDICINE

## 2023-07-03 PROCEDURE — 80048 BASIC METABOLIC PNL TOTAL CA: CPT | Performed by: INTERNAL MEDICINE

## 2023-07-03 PROCEDURE — 85027 COMPLETE CBC AUTOMATED: CPT | Performed by: INTERNAL MEDICINE

## 2023-07-03 PROCEDURE — 99238 HOSP IP/OBS DSCHRG MGMT 30/<: CPT | Performed by: INTERNAL MEDICINE

## 2023-07-03 RX ORDER — FLUCONAZOLE 150 MG/1
150 TABLET ORAL ONCE
Qty: 1 TABLET | Refills: 0 | Status: SHIPPED | OUTPATIENT
Start: 2023-07-03 | End: 2023-07-03

## 2023-07-03 RX ADMIN — DULOXETINE HYDROCHLORIDE 20 MG: 20 CAPSULE, DELAYED RELEASE ORAL at 08:13

## 2023-07-03 RX ADMIN — ESCITALOPRAM OXALATE 20 MG: 20 TABLET, FILM COATED ORAL at 08:13

## 2023-07-03 RX ADMIN — LEVOTHYROXINE SODIUM 150 MCG: 150 TABLET ORAL at 06:55

## 2023-07-03 RX ADMIN — PANCRELIPASE 3 TABLET: 20880; 78300; 78300 TABLET ORAL at 07:30

## 2023-07-03 RX ADMIN — PANTOPRAZOLE SODIUM 40 MG: 40 TABLET, DELAYED RELEASE ORAL at 08:13

## 2023-07-03 RX ADMIN — CEFTRIAXONE 2 G: 2 INJECTION, POWDER, FOR SOLUTION INTRAMUSCULAR; INTRAVENOUS at 08:09

## 2023-07-03 ASSESSMENT — ACTIVITIES OF DAILY LIVING (ADL)
ADLS_ACUITY_SCORE: 24

## 2023-07-03 NOTE — PLAN OF CARE
A&Ox4. Up independent in room. Denies pain. No fevers. Discharged home today,  providing transport. Writer discussed discharge instructions with patient. New medications given to patient, she verbalized understanding on how and when to take new medications. She did not have any questions or concerns at time of discharge.

## 2023-07-03 NOTE — DISCHARGE SUMMARY
[facility]  Hospitalist Discharge Summary      Date of Admission:  6/30/2023  Date of Discharge:  7/3/2023 11:09 AM  Discharging Provider: Adelfo Ervin MD  Discharge Service: Hospitalist Service    Discharge Diagnoses   Sepsis 2/2 E Coli Pyelonephritis w/ E Coli Bacteremia  -Both urine culture and blood cultures have returned positive for pan-sensitive E. Coli     IDDM Type I treated w/ Insulin Pump     Pancreatic Islet Cell Transplant Status  -Not on immunosuppression as transplanted her own islet cells     Non-Obstructing Kidney Stones     Hyponatremia    Clinically Significant Risk Factors          Follow-ups Needed After Discharge   Follow-up Appointments     Follow-up and recommended labs and tests       See primary provider as needed            Unresulted Labs Ordered in the Past 30 Days of this Admission     Date and Time Order Name Status Description    7/1/2023  7:19 PM Blood Culture Arm, Left Preliminary     7/1/2023  7:19 PM Blood Culture Arm, Right Preliminary     6/30/2023  8:00 PM Blood Culture Peripheral Blood Preliminary           Discharge Disposition   Discharged to home  Condition at discharge: Stable    Hospital Course   58 year old female admitted on 6/30/2023. She is with past medical history of pancreatic islet transplant, chronic kidney stones presented to the ER with fever and flank pain.  She was found to have an abnormal UA c/w urinary infection with CT scan showing an ascending urinary tract infection with cystitis, bilateral ureteritis and pyelitis, and early bilateral pyelonephritis.  Urine and blood cx ultimately both grew pan-sensitive e.coli.  She responded well to IV antibiotics and is being discharged on Augmentin to complete a 14 day course of therapy.      Consultations This Hospital Stay   None    Code Status   Prior    Time Spent on this Encounter   I, Adelfo Ervin MD, personally saw the patient today and spent less than or equal to 30  minutes discharging this patient.       Adelfo Ervin MD  [facility] MEDICAL SURGICAL  [address]  [facility] [address]  Phone: [phone]  Fax: [phone]  ______________________________________________________________________    Physical Exam   Vital Signs: Temp: 98.4  F (36.9  C) Temp src: Oral BP: 106/41 Pulse: 74   Resp: 16 SpO2: 98 % O2 Device: None (Room air)    Weight: 125 lbs 0 oz  Awake, interactive, alert, NAD  RRR  CTA B  Non-tender abdomen       Primary Care Physician   Maryana Brooks    Discharge Orders      Reason for your hospital stay    Kidney infection     Follow-up and recommended labs and tests     See primary provider as needed     Activity    Your activity upon discharge: activity as tolerated     Diet    Follow this diet upon discharge: regular diet       Significant Results and Procedures   Results for orders placed or performed during the hospital encounter of 06/30/23   CT Abdomen Pelvis w Contrast    Narrative    EXAM: CT ABDOMEN PELVIS W CONTRAST  LOCATION: [facility]  DATE: 6/30/2023    INDICATION: Left flank pain. Urinary tract infection. Fever. Sepsis.  COMPARISON: CT abdomen pelvis 11/23/2021.  TECHNIQUE: CT scan of the abdomen and pelvis was performed following injection of IV contrast. Multiplanar reformats were obtained. Dose reduction techniques were used.  CONTRAST: 63mL Isovue 370    FINDINGS:   LOWER CHEST: Normal.    HEPATOBILIARY: Status post cholecystectomy and hepaticojejunostomy with unchanged expected pneumobilia. No liver lesions.     PANCREAS: Surgically absent.    SPLEEN: Surgically absent.    ADRENAL GLANDS: Normal.    KIDNEYS/BLADDER: Mild cortical scarring at the left upper renal pole. Few faint patchy hypoenhancing areas in the left upper renal pole and right mid to upper pole, suspicious for pyelonephritis. No renal abscess. Diffuse urothelial thickening and   enhancement throughout both renal  pelves and ureters. Cluster of nonobstructing calculi at the left lower pole, the largest of which measures 4 mm. Nonobstructing 2 mm right lower pole renal calculus. No ureteral calculi or hydronephrosis. Mild diffuse   urinary bladder wall thickening with mild mucosal enhancement.    BOWEL: Status post gastric bypass and ileocecectomy. No evidence of bowel obstruction or inflammation. Moderate amount of stool within the ascending colon. Small amount of stool within the descending and proximal sigmoid colon.    LYMPH NODES: No enlarged lymph nodes. Multiple prominent but nonenlarged lymph nodes are present within the central mesentery, similar to prior.    VASCULATURE: Patent portal and superior mesenteric veins. Nonaneurysmal abdominal aorta.    PELVIC ORGANS: Uterus is absent.    MUSCULOSKELETAL: No aggressive bone lesions or acute bony abnormality.      Impression    IMPRESSION:     1.  Ascending urinary tract infection with cystitis, bilateral ureteritis and pyelitis, and early bilateral pyelonephritis. No renal abscess.    2.  Bilateral nonobstructing intrarenal calculi measuring up to 4 mm on the left. No hydronephrosis.     *Note: Due to a large number of results and/or encounters for the requested time period, some results have not been displayed. A complete set of results can be found in Results Review.       Discharge Medications   Discharge Medication List as of 7/3/2023 11:00 AM      START taking these medications    Details   amoxicillin-clavulanate (AUGMENTIN) 875-125 MG tablet Take 1 tablet by mouth 2 times daily for 12 days, Disp-24 tablet, R-0, E-Prescribe      fluconazole (DIFLUCAN) 150 MG tablet Take 1 tablet (150 mg) by mouth once for 1 dose, Disp-1 tablet, R-0, E-Prescribe         CONTINUE these medications which have NOT CHANGED    Details   calcium carbonate 600 mg-vitamin D 400 units (CALTRATE) 600-400 MG-UNIT per tablet Take 1 tablet by mouth daily, Historical      DULoxetine (CYMBALTA) 20  MG capsule Take 20 mg by mouth daily, Historical      escitalopram (LEXAPRO) 20 MG tablet Take 20 mg by mouth daily, Historical      estradiol (ESTRACE) 0.1 MG/GM vaginal cream Place vaginally twice a week PLACE 2 GRAMS VAGINALLY 2 TIMES WEEKLYDisp-42.5 g, [id number]      famotidine (PEPCID) 40 MG tablet Take 1 tablet (40 mg) by mouth 2 times daily as needed for heartburn, Disp-180 tablet, R-3, E-Prescribe      FIASP PENFILL 100 UNIT/ML SOCT Inject 0.5-4 Units Subcutaneous 4 times daily, INJECT with meals and nightly, Disp-15 mL, R-0, AMBER, E-Prescribe      gabapentin (NEURONTIN) 300 MG capsule Take 300 mg by mouth nightly as needed, Historical      Glucagon (GVOKE HYPOPEN 1-PACK) 1 MG/0.2ML SOAJ Inject 1 mg Subcutaneous once as needed (for hypoglycemia with loss of consciousness), Disp-15 mL, R-5, E-Prescribe      glucose 40 % GEL Take 15-30 g by mouth every 15 minutes as needed.Disp-1 Tube, [id number]      hydrOXYzine (ATARAX) 25 MG tablet TAKE 1-2 TABLETS BY MOUTH 2 TIMES DAILY AS NEEDED FOR ANXIETY, R-0, Historical      Insulin Aspart, w/Niacinamide, (FIASP) 100 UNIT/ML SOLN 15 Units by Subcutaneous Infusion route daily Up to 85 units in continuous insulin pump, Disp-90 mL, R-1, E-Prescribe      Insulin Disposable Pump (OMNIPOD 5 G6 INTRO, GEN 5,) KIT 1 each continuous, Disp-1 kit, R-0, E-Prescribe      Insulin Disposable Pump (OMNIPOD 5 G6 POD, GEN 5,) MISC 1 each every 3 days, Disp-30 each, R-5, E-Prescribe      insulin glargine (LANTUS PEN) 100 UNIT/ML pen Inject 6 units daily in the event of pump failure, Disp-15 mL, R-0, E-PrescribeIf Lantus is not covered by insurance, may substitute Basaglar or Semglee or other insulin glargine product per insurance preference at same dose and frequency.        INSULIN PUMP - OUTPATIENT Inject Subcutaneous continuous Date Last Updated: 7/1  Omnipod, type of insulin: Fiasp  Basal Rates in units/hr  3579-0837: 0.15   4532-3336: 0.25  ICF (insulin to carb ratio): 20  (1 unit covers 20g carbs)  ISF (insulin sensitivity factor): 140-170 (1 un it lowers BG by 140-170mg/dL)- very sernsitive  Target B-130 mg/dL  Active Insulin Time: 3 hours, Historical      levothyroxine (SYNTHROID/LEVOTHROID) 150 MCG tablet Take 1 tablet (150 mcg) by mouth daily, Disp-30 tablet, R-11, E-Prescribe      lipase-protease-amylase (VIOKACE) 60643-41184 units TABS tablet Take 5 with meals and 3 with snacks; approximately daily maximum of 20 capsules, Disp-500 tablet, R-11, E-Prescribe      loratadine (CLARITIN) 10 MG tablet Take 10 mg by mouth daily as needed , Historical      omeprazole (PRILOSEC) 40 MG DR capsule Take 1 capsule (40 mg) by mouth 2 times daily Take 30-60 minutes before a meal., Disp-180 capsule, R-3, E-Prescribe      potassium citrate (UROCIT-K) 10 MEQ (1080 MG) CR tablet Take 1 tablet (10 mEq) by mouth 3 times daily (with meals), Disp-60 tablet, R-11, E-Prescribe      traZODone (DESYREL) 50 MG tablet Take 50 mg by mouth nightly as needed for sleep, Historical      Continuous Blood Gluc Sensor (DEXCOM G6 SENSOR) MISC CHANGE EVERY 10 DAYS., Disp-3 each, R-5, E-Prescribe      Continuous Blood Gluc Transmit (DEXCOM G6 TRANSMITTER) MISC CHANGE EVERY 3 MONTHS., Disp-1 each, R-1, E-Prescribe      insulin pen needle (32G X 4 MM) 32G X 4 MM miscellaneous Use 4-6 pen needles daily to inject subcutaneous insulinDisp-200 each, [id number]           Allergies   Allergies   Allergen Reactions     Corticosteroids Other (See Comments)     All oral, IV and injectable steroids are contraindicated (unless in life threatening situations) in Islet Auto transplant recipients. They can cause irreversible loss of islet cell function. Please contact patient's transplant care coordinator, Erlinda Multani RN BSN at [phone]/pager: [phone] and endocrinologist prior to administration.       Povidone Iodine Hives     Causes skin to blister     Nsaids      naprosyn = GI upset     Sulfasalazine  Nausea and Nausea and Vomiting

## 2023-07-03 NOTE — PROGRESS NOTES
I saw and examined this patient today    She appears stable for discharge home today and is eager to leave the hospital    Will discharge on PO Augmentin.  She is also requesting a one time dose of PO Fluconazole as she frequently develops yeast infections while on antibiotics    Home today

## 2023-07-06 LAB — BACTERIA BLD CULT: NO GROWTH

## 2023-07-07 LAB
BACTERIA BLD CULT: NO GROWTH
BACTERIA BLD CULT: NO GROWTH

## 2023-07-12 DIAGNOSIS — E89.1 POST-PANCREATECTOMY DIABETES (H): ICD-10-CM

## 2023-07-12 DIAGNOSIS — Z90.410 POST-PANCREATECTOMY DIABETES (H): ICD-10-CM

## 2023-07-12 DIAGNOSIS — E13.9 POST-PANCREATECTOMY DIABETES (H): ICD-10-CM

## 2023-07-13 RX ORDER — PEN NEEDLE, DIABETIC 32GX 5/32"
NEEDLE, DISPOSABLE MISCELLANEOUS
Qty: 200 EACH | Refills: 0 | Status: SHIPPED | OUTPATIENT
Start: 2023-07-13 | End: 2023-09-08

## 2023-07-19 ENCOUNTER — TRANSFERRED RECORDS (OUTPATIENT)
Dept: MULTI SPECIALTY CLINIC | Facility: CLINIC | Age: 58
End: 2023-07-19

## 2023-07-19 LAB — RETINOPATHY: NORMAL

## 2023-08-07 ENCOUNTER — TELEPHONE (OUTPATIENT)
Dept: TRANSPLANT | Facility: CLINIC | Age: 58
End: 2023-08-07
Payer: COMMERCIAL

## 2023-08-07 DIAGNOSIS — K86.89 PANCREATIC INSUFFICIENCY: ICD-10-CM

## 2023-08-07 NOTE — TELEPHONE ENCOUNTER
Patient Call: Medication Refill  Route to LPN  Instruct the patient to first contact their pharmacy. If they have called their pharmacy and require further assistance, route to LPN.    Pharmacy Name: [facility]   Pharmacy Location: [address] [place] [address]  Name of Medication: Viokamanny Dose: [id number]   When will the patient be out of this medication?: Less than 24 hours ([facility] LPN, then page if no answer)

## 2023-08-07 NOTE — TELEPHONE ENCOUNTER
Prior Authorization Specialty Medication Request    Medication/Dose: lipase-protease-amylase (VIOKACE) 72275-[address] units TABS tablet. Take 5 with meals and 3 with snacks; approximately daily maximum of 20 capsules   ICD code (if different than what is on RX):    Previously Tried and Failed:      Important Lab Values:   Rationale:     Insurance Name:   Insurance ID:   Insurance Phone Number:     Pharmacy Information (if different than what is on RX)  Name:    Phone:

## 2023-08-08 NOTE — TELEPHONE ENCOUNTER
Prior Authorization Not Needed per Insurance    Medication: MILLERBIANCA [id number] UNITS PO TABS  Insurance Company:    Expected CoPay:  $0    Pharmacy Filling the Rx: [facility] - [place], [place] - [facility] [address]   Pharmacy Notified: YES   Patient Notified:  NO     PA on file good through 9/17/2023  Unable to complete a new PA earlier then exactly a month out

## 2023-08-11 ENCOUNTER — LAB (OUTPATIENT)
Dept: INFUSION THERAPY | Facility: CLINIC | Age: 58
End: 2023-08-11
Attending: INTERNAL MEDICINE
Payer: COMMERCIAL

## 2023-08-11 ENCOUNTER — ONCOLOGY VISIT (OUTPATIENT)
Dept: ONCOLOGY | Facility: CLINIC | Age: 58
End: 2023-08-11
Attending: INTERNAL MEDICINE
Payer: COMMERCIAL

## 2023-08-11 VITALS
OXYGEN SATURATION: 98 % | HEART RATE: 65 BPM | RESPIRATION RATE: 16 BRPM | DIASTOLIC BLOOD PRESSURE: 82 MMHG | SYSTOLIC BLOOD PRESSURE: 130 MMHG

## 2023-08-11 DIAGNOSIS — D50.8 OTHER IRON DEFICIENCY ANEMIA: Primary | ICD-10-CM

## 2023-08-11 DIAGNOSIS — D50.8 OTHER IRON DEFICIENCY ANEMIA: ICD-10-CM

## 2023-08-11 LAB
BASOPHILS # BLD AUTO: 0.1 10E3/UL (ref 0–0.2)
BASOPHILS NFR BLD AUTO: 2 %
EOSINOPHIL # BLD AUTO: 0.1 10E3/UL (ref 0–0.7)
EOSINOPHIL NFR BLD AUTO: 2 %
ERYTHROCYTE [DISTWIDTH] IN BLOOD BY AUTOMATED COUNT: 15.3 % (ref 10–15)
FERRITIN SERPL-MCNC: 51 NG/ML (ref 11–328)
HCT VFR BLD AUTO: 36.6 % (ref 35–47)
HGB BLD-MCNC: 11.4 G/DL (ref 11.7–15.7)
IMM GRANULOCYTES # BLD: 0 10E3/UL
IMM GRANULOCYTES NFR BLD: 0 %
IRON BINDING CAPACITY (ROCHE): 307 UG/DL (ref 240–430)
IRON SATN MFR SERPL: 12 % (ref 15–46)
IRON SERPL-MCNC: 38 UG/DL (ref 37–145)
LYMPHOCYTES # BLD AUTO: 1.9 10E3/UL (ref 0.8–5.3)
LYMPHOCYTES NFR BLD AUTO: 27 %
MCH RBC QN AUTO: 28.1 PG (ref 26.5–33)
MCHC RBC AUTO-ENTMCNC: 31.1 G/DL (ref 31.5–36.5)
MCV RBC AUTO: 90 FL (ref 78–100)
MONOCYTES # BLD AUTO: 0.7 10E3/UL (ref 0–1.3)
MONOCYTES NFR BLD AUTO: 10 %
NEUTROPHILS # BLD AUTO: 4.2 10E3/UL (ref 1.6–8.3)
NEUTROPHILS NFR BLD AUTO: 59 %
NRBC # BLD AUTO: 0 10E3/UL
NRBC BLD AUTO-RTO: 0 /100
PLATELET # BLD AUTO: 528 10E3/UL (ref 150–450)
RBC # BLD AUTO: 4.06 10E6/UL (ref 3.8–5.2)
WBC # BLD AUTO: 7 10E3/UL (ref 4–11)

## 2023-08-11 PROCEDURE — 85025 COMPLETE CBC W/AUTO DIFF WBC: CPT | Performed by: PHYSICIAN ASSISTANT

## 2023-08-11 PROCEDURE — 99214 OFFICE O/P EST MOD 30 MIN: CPT | Performed by: INTERNAL MEDICINE

## 2023-08-11 PROCEDURE — 36415 COLL VENOUS BLD VENIPUNCTURE: CPT

## 2023-08-11 PROCEDURE — 99215 OFFICE O/P EST HI 40 MIN: CPT | Performed by: INTERNAL MEDICINE

## 2023-08-11 PROCEDURE — 83550 IRON BINDING TEST: CPT | Performed by: PHYSICIAN ASSISTANT

## 2023-08-11 PROCEDURE — 82728 ASSAY OF FERRITIN: CPT | Performed by: PHYSICIAN ASSISTANT

## 2023-08-11 RX ORDER — DIPHENHYDRAMINE HYDROCHLORIDE 50 MG/ML
50 INJECTION INTRAMUSCULAR; INTRAVENOUS
Status: CANCELLED
Start: 2023-08-18

## 2023-08-11 RX ORDER — ALBUTEROL SULFATE 90 UG/1
1-2 AEROSOL, METERED RESPIRATORY (INHALATION)
Status: CANCELLED
Start: 2023-08-18

## 2023-08-11 RX ORDER — HEPARIN SODIUM,PORCINE 10 UNIT/ML
5-20 VIAL (ML) INTRAVENOUS DAILY PRN
Status: CANCELLED | OUTPATIENT
Start: 2023-08-18

## 2023-08-11 RX ORDER — ALBUTEROL SULFATE 0.83 MG/ML
2.5 SOLUTION RESPIRATORY (INHALATION)
Status: CANCELLED | OUTPATIENT
Start: 2023-08-18

## 2023-08-11 RX ORDER — HEPARIN SODIUM (PORCINE) LOCK FLUSH IV SOLN 100 UNIT/ML 100 UNIT/ML
5 SOLUTION INTRAVENOUS
Status: CANCELLED | OUTPATIENT
Start: 2023-08-18

## 2023-08-11 RX ORDER — METHYLPREDNISOLONE SODIUM SUCCINATE 125 MG/2ML
125 INJECTION, POWDER, LYOPHILIZED, FOR SOLUTION INTRAMUSCULAR; INTRAVENOUS
Status: CANCELLED
Start: 2023-08-18

## 2023-08-11 RX ORDER — EPINEPHRINE 1 MG/ML
0.3 INJECTION, SOLUTION INTRAMUSCULAR; SUBCUTANEOUS EVERY 5 MIN PRN
Status: CANCELLED | OUTPATIENT
Start: 2023-08-18

## 2023-08-11 RX ORDER — MEPERIDINE HYDROCHLORIDE 25 MG/ML
25 INJECTION INTRAMUSCULAR; INTRAVENOUS; SUBCUTANEOUS EVERY 30 MIN PRN
Status: CANCELLED | OUTPATIENT
Start: 2023-08-18

## 2023-08-11 ASSESSMENT — PAIN SCALES - GENERAL: PAINLEVEL: NO PAIN (0)

## 2023-08-11 NOTE — PROGRESS NOTES
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        Lynn says that she has had anemia all of her life.  She was previously followed by Dr. Tom Malcolm at [facility] since 2004.  She was followed and treated for iron-deficiency anemia.  It was felt that it was due to poor absorption from her gastric bypass and various bowel surgeries in the past.  She was unable to tolerate oral and and could not absorb it.  She gets IV iron intermittently.  She says a round of IV iron would last her for several years.  She last saw Dr. Malcolm in 2019.       Patient has been anemic again.  She underwent colonoscopy on 4/1/21, which found some polyps that were removed.  Due to unintentional weight loss, she underwent mammogram that was negative.  She had a CT abdomen/pelvis on 3/1/21 that was normal, other than non-specific proximal colitis.  She does not smoke.       She received Injectafer x 2 doses in May 2021.    Interval History:  Patient states she does feel fatigued all the time.  She does have leg cramps.  Her iron saturation is at 12%.  She continues to have issues with pyelonephritis and E. coli sepsis.  She is not following up with infectious disease long-term but she does see urology next week.  She was discharged from the hospital beginning of July     Review of Systems:  A complete review of systems was negative except as noted in the HPI.     Past medical, Surgical, and social history:  Reviewed    Physical Examination:  General: Pleasant patient in no acute distress. No pallor. Normal work of breathing. Oriented and  alert.     Laboratory Data:  Labs noted and reviewed with the patient hemoglobin 11.4 iron saturation 12%    Assessment and Plan:  Lynn Thompson is a 58 year old female with history of multiple bowel surgeries and iron deficiency anemia.     1. Anemia : due to iron deficiency  2. History of Iron deficiency  Patient has long-standing history of iron deficiency anemia at least since 2004. Last received IV Injectafer x 2 in 5/2021. She is up to date on GI screenings, most recent colonoscopy with last 4/2021.  She has iron deficiency now.  History of gastric bypass.  Injectafer x2.  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

## 2023-08-11 NOTE — PROGRESS NOTES
Medical Assistant Note:  Lynn Thompson presents today for blood draw.    Patient seen by provider today: Yes: stephani.   present during visit today: Not Applicable.    Concerns: No Concerns.    Procedure:  Lab draw site: lac, Needle type: bf, Gauge: 23.    Post Assessment:  Labs drawn without difficulty: Yes.    Discharge Plan:  Departure Mode: Ambulatory.    Face to Face Time: 5 min.    Caridad Sinclair, CMA

## 2023-08-11 NOTE — LETTER
8/11/2023         RE: Lynn Thompson  [id number] [address] [id number]        Dear Colleague,    Thank you for referring your patient, Lynn Thompson, to the [facility] [place]. Please see a copy of my visit note below.    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        Lynn says that she has had anemia all of her life.  She was previously followed by Dr. Tom Malcolm at [facility] since 2004.  She was followed and treated for iron-deficiency anemia.  It was felt that it was due to poor absorption from her gastric bypass and various bowel surgeries in the past.  She was unable to tolerate oral and and could not absorb it.  She gets IV iron intermittently.  She says a round of IV iron would last her for several years.  She last saw Dr. Malcolm in 2019.       Patient has been anemic again.  She underwent colonoscopy on 4/1/21, which found some polyps that were removed.  Due to unintentional weight loss, she underwent mammogram that was negative.  She had a CT abdomen/pelvis on 3/1/21 that was normal, other than non-specific proximal colitis.  She does not smoke.       She received Injectafer x 2 doses in May 2021.    Interval History:  Patient states she does feel fatigued all the time.  She does have leg cramps.  Her iron saturation is at 12%.  She continues to have issues with pyelonephritis and E. coli sepsis.  She is not following up with infectious disease long-term but she does see urology next week.  She was discharged from the hospital beginning of July     Review of  Systems:  A complete review of systems was negative except as noted in the HPI.     Past medical, Surgical, and social history:  Reviewed    Physical Examination:  General: Pleasant patient in no acute distress. No pallor. Normal work of breathing. Oriented and alert.     Laboratory Data:  Labs noted and reviewed with the patient hemoglobin 11.4 iron saturation 12%    Assessment and Plan:  Lynn Thompson is a 58 year old female with history of multiple bowel surgeries and iron deficiency anemia.     1. Anemia : due to iron deficiency  2. History of Iron deficiency  Patient has long-standing history of iron deficiency anemia at least since 2004. Last received IV Injectafer x 2 in 5/2021. She is up to date on GI screenings, most recent colonoscopy with last 4/2021.  She has iron deficiency now.  History of gastric bypass.  Injectafer x2.  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        Sincerely,        Nico Montero MD

## 2023-08-15 ENCOUNTER — TELEPHONE (OUTPATIENT)
Dept: ONCOLOGY | Facility: CLINIC | Age: 58
End: 2023-08-15

## 2023-08-15 ENCOUNTER — VIRTUAL VISIT (OUTPATIENT)
Dept: NEPHROLOGY | Facility: CLINIC | Age: 58
End: 2023-08-15
Payer: COMMERCIAL

## 2023-08-15 DIAGNOSIS — N20.0 NEPHROLITHIASIS: Primary | ICD-10-CM

## 2023-08-15 DIAGNOSIS — R82.992 HYPEROXALURIA: ICD-10-CM

## 2023-08-15 DIAGNOSIS — D50.8 OTHER IRON DEFICIENCY ANEMIA: Primary | ICD-10-CM

## 2023-08-15 PROCEDURE — 99214 OFFICE O/P EST MOD 30 MIN: CPT | Mod: VID | Performed by: INTERNAL MEDICINE

## 2023-08-15 RX ORDER — HEPARIN SODIUM (PORCINE) LOCK FLUSH IV SOLN 100 UNIT/ML 100 UNIT/ML
5 SOLUTION INTRAVENOUS
Status: CANCELLED | OUTPATIENT
Start: 2023-08-22

## 2023-08-15 RX ORDER — DIPHENHYDRAMINE HYDROCHLORIDE 50 MG/ML
50 INJECTION INTRAMUSCULAR; INTRAVENOUS
Status: CANCELLED
Start: 2023-08-22

## 2023-08-15 RX ORDER — EPINEPHRINE 1 MG/ML
0.3 INJECTION, SOLUTION, CONCENTRATE INTRAVENOUS EVERY 5 MIN PRN
Status: CANCELLED | OUTPATIENT
Start: 2023-08-22

## 2023-08-15 RX ORDER — HEPARIN SODIUM,PORCINE 10 UNIT/ML
5-20 VIAL (ML) INTRAVENOUS DAILY PRN
Status: CANCELLED | OUTPATIENT
Start: 2023-08-22

## 2023-08-15 RX ORDER — ALBUTEROL SULFATE 90 UG/1
1-2 AEROSOL, METERED RESPIRATORY (INHALATION)
Status: CANCELLED
Start: 2023-08-22

## 2023-08-15 RX ORDER — ALBUTEROL SULFATE 0.83 MG/ML
2.5 SOLUTION RESPIRATORY (INHALATION)
Status: CANCELLED | OUTPATIENT
Start: 2023-08-22

## 2023-08-15 RX ORDER — MEPERIDINE HYDROCHLORIDE 25 MG/ML
25 INJECTION INTRAMUSCULAR; INTRAVENOUS; SUBCUTANEOUS EVERY 30 MIN PRN
Status: CANCELLED | OUTPATIENT
Start: 2023-08-22

## 2023-08-15 ASSESSMENT — PAIN SCALES - GENERAL: PAINLEVEL: NO PAIN (0)

## 2023-08-15 NOTE — NURSING NOTE
Is the patient currently in the state of [place]? YES    Visit mode:VIDEO    If the visit is dropped, the patient can be reconnected by: VIDEO VISIT: Text to cell phone: [phone]    Will anyone else be joining the visit? NO      How would you like to obtain your AVS? MyChart    Are changes needed to the allergy or medication list? NO    Reason for visit: No chief complaint on file.

## 2023-08-15 NOTE — TELEPHONE ENCOUNTER
Called Lynn per recommendation from her nephrologist to change from Injectafer to Feraheme. Lynn is aware that pharmacy [facility] is checking with her insurance and will get back to her. She is aware that 8/17/23 dose may have to be delayed. Payal Aiken RN,BSN,OCN,CBCN

## 2023-08-15 NOTE — PATIENT INSTRUCTIONS
Increase calcium citrate to 2 pills twice a day with food.    [employer] January 2024    Return in about 6 months (around 2/15/2024).

## 2023-08-15 NOTE — PROGRESS NOTES
[facility]  08/15/2023     Lynn Thompson MRN:[id number] YOB: 1965  Primary care provider: Maryana Brooks  Requesting physician: Alonzo Roem     ASSESSMENT AND RECOMMENDATIONS:   Lynn Thompson is a 58 year old presenting for nephrolithiasis.  # Recurrent kidney stones: stone type primarily calcium oxalate though 2 analysis showed small component of calcium phosphate  - has had multiple operations and also very strong family history so likely combination of genetics along with enteric hyperoxaluria after gastric bypass and pancreatectomy    # enteric hyperoxaluria - improved but not controlled on calcium supplements (was not on calcium supplements initially in 2015 when urine collection showed urine oxalate >100 mg and urine calcium <100 mg)  - urine oxalate still high despite cutting out spinach, will increase calcium citrate pills to 2 twice a day with meals    # acceptable renal function based on Oct 2022 studies  - iohexol GFR 66 ml/min/1.73 m2 (raw clearance 59 ml/min)  - cystatin C GFR 73  - creatinine based eGFR 80's so creatinine does overestimate renal function in Lynn    # history Quinn en Y gastric bypass  # history Crohns - s/p partial resection    At risk for making more kidney stones so will benefit from urine chemistry/supersaturation evaluation and monitoring and regular follow up for kidney stone prevention.    Other co-morbidities:  # history chronic pancreatitis  # total pancreatectomy with auto islet 2013 - follows with Dr. 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    Known issue that I take into account for other medical decisions, no current exacerbations or new concerns.     Repeat 24 hour chemistries in 2024  Repeat imaging per Dr. Rome  Return in about 6 months (around 2/15/2024).     34 minutes spent on visit, meeting with Lynn Thompson and in chart review and documentation on date of encounter, 08/15/23      Hannah Murcia MD  [facility]  Department of Medicine  Division of Renal Disease and Hypertension  Amcom  Vocera Web Console        REASON FOR VISIT: nephrolithiasis    HISTORY OF PRESENT ILLNESS:  Lynn Thompson is a 58 year old with history Crohn's with ileal resection , gale en Y gastric bypass (), chronic pancreatitis s/p pancreatectomy and auto islet cell transplant () with feeding tube 4-5 months after, post pancreatectomy diabetes (), kidney stones who presents for follow up. Previously saw Dr. Solano, last visit in , for recurrent kidney stones with severe hyperoxaluria was managed with calcium carbonate suspension and potassium citrate.    Has history of intermittent SBO and in past dependent on liquid diet, has required lysis of adhesions in past multiple times.    Stone surgical history:   2022 Left URS, laser lithotripsy, stone basketing  2015 right URS and lithotripsy with stone basketing  3/23/15 right URS, lithotripsy  10/16/2012 left ESWL  3/2011 right stent for ureteral stone and obstruction  2011 right URS and stone extraction  3/5/2008 right and left ESWL    Last imagin2022 ultrasound - no stones mentioned on report    Stone [place]:   Left: cleared 99% 2022  Right: 4 mm lower pole stone noted on CT 2022    Diet:  Cannot eat a whole lot in one sitting, grazes  Watches what she eats  Proteins - chicken, eggs, ground beef or ground turkey, fish  Calcium - yogurt, cheese, calcium citrate with vitamin D -  1 pill am and 1 pill pm  Some whole beans and grains  Fruit and vegetable - watches sugar content of fruit, berries, grapefruit, watermelon, pineapple, oranges  Broccoli, stir lovell, chopped cabbage salad, carrots, spinach, potato    Never feels hungry, has to make herself eat, usually does not eat breakfast    Fluids - 1 cup coffee, 1 diet coke, water (plain), seldom herbal tea, alcohol less than 4 drinks per year    Has diarrhea/dumping with too much sugar, since rectal prolapse, has been more diligent with foods that impact GI system    Had recent spine surgery.     Last visit 5/2/2023  Hospitalized 6/30/23-7/3/2023 with sepsis, pyelonephritis and e coli bacteremia - completed 14 days of therapy (oral augmentin).    Has been taking spinach out of diet. Taking calcium citrate twice a day with food.    I reviewed 24 hour urine chemstries:                  Family history is positive for kidney stones in several siblings, older brother has had hundreds, father and grandfather also had kidney stones    REVIEW OF SYSTEMS:  A 10 point review of systems was negative except as noted above.    Problem list  Patient Active Problem List   Diagnosis    Iron deficiency anemia secondary to inadequate dietary iron intake    Gastric bypass status for obesity    Health Care Home    Chronic pain syndrome    Exocrine pancreatic insufficiency    Asplenia    BLU (obstructive sleep apnea)    H/O of rectopexy    Dysthymia    Anxiety    Thyroid nodule    Acquired hypothyroidism    Post-pancreatectomy diabetes (H)    Other iron deficiency anemia    Uterovaginal prolapse, unspecified    Rectal prolapse    Small bowel obstruction (H)    Urge incontinence    Urinary urgency    High-tone pelvic floor dysfunction    Pyuria    Recurrent kidney stones    History of uterine prolapse    Vaginal atrophy    Enteric hyperoxaluria    Hypocitraturia    Acute pyelonephritis    Acute sepsis (H)     PSH  Past Surgical History:   Procedure Laterality Date     ABDOMINOPLASTY  2002    Tummy tuck    APPENDECTOMY  1990    BUNIONECTOMY Right 1998    CBD Stent placement  2002    CBD stent; Dr. Presley     SECTION      CHOLECYSTECTOMY      COLONOSCOPY N/A 2021    Procedure: COLONOSCOPY INCOMPLETE Aborted due to incomplete prep  will need to take additional prep and return tomorrow 21;  Surgeon: Ihsan Saenz MD;  Location: [facility]    COMBINED CYSTOSCOPY, RETROGRADES, URETEROSCOPY, LASER HOLMIUM LITHOTRIPSY URETER(S), INSERT STENT Right 2015    Procedure: COMBINED CYSTOSCOPY, RETROGRADES, URETEROSCOPY, LASER HOLMIUM LITHOTRIPSY URETER(S), INSERT STENT;  Surgeon: Kennedi Aldana MD;  Location: [facility]    COMBINED CYSTOSCOPY, RETROGRADES, URETEROSCOPY, LASER HOLMIUM LITHOTRIPSY URETER(S), INSERT STENT Right 2015    Procedure: COMBINED CYSTOSCOPY, RETROGRADES, URETEROSCOPY, LASER HOLMIUM LITHOTRIPSY URETER(S), INSERT STENT;  Surgeon: Kennedi Aldana MD;  Location: [facility] OR    COSMETIC SURGERY  2002    Tummy tuck    CYSTECTOMY OVARIAN BENIGN Right     CYSTOSCOPY, RETROGRADES, INSERT STENT URETER(S), COMBINED  10/02/2012    Procedure: COMBINED CYSTOSCOPY, RETROGRADES, INSERT STENT URETER(S);  COMBINED CYSTOSCOPY,  , INSERT LEFT STENT URETER;  Surgeon: Johny Baez MD;  Location: [facility] OR    CYSTOSCOPY, RETROGRADES, INSERT STENT URETER(S), COMBINED Left 2022    Procedure: Cystoscopy with left retrograde pyelogram, left ureteroscopy, thulium laser lithotripsy of left ureteral calculus, stone basketing and left ureteral stent insertion;  Surgeon: Alonzo Rome MD;  Location: [facility] OR    ESOPHAGOSCOPY, GASTROSCOPY, DUODENOSCOPY (EGD), COMBINED N/A 2018    Procedure: COMBINED ESOPHAGOSCOPY, GASTROSCOPY, DUODENOSCOPY (EGD);  ESOPHAGOSCOPY, GASTROSCOPY, DUODENOSCOPY (EGD)    ;  Surgeon: Tamir Rodgers MD;  Location:  GI    EXTRACORPOREAL SHOCK WAVE LITHOTRIPSY (ESWL)  10/16/2012    Procedure: EXTRACORPOREAL  SHOCK WAVE LITHOTRIPSY (ESWL);  left EXTRACORPOREAL SHOCK WAVE LITHOTRIPSY (ESWL) ;  Surgeon: Johny Baez MD;  Location: [facility] OR    Gastric bypass NOS      HERNIA REPAIR  02/2015    HYSTERECTOMY SUPRACERVICAL, BILATERAL SALPINGO-OOPHORECTOMY, COMBINED N/A 02/01/2022    Procedure: Abdominal supracervical hysterectomy, bilateral salpingooophrectomy;  Surgeon: Nicole Rivera MD;  [facility]    IRRIGATION AND DEBRIDEMENT HAND, COMBINED Left 10/30/2020    Procedure: Left hand sharp excisional debridement of skin, subcutaneous tissue and fat with a scalpel, 2 x 1 x 1 cm.;  Surgeon: Demian Renteria MD;  Location: [facility]    LAPAROSCOPIC LYSIS ADHESIONS N/A 02/20/2015    Procedure: LAPAROSCOPIC LYSIS ADHESIONS;  Surgeon: Aaron Early MD;  [facility]    LAPAROSCOPIC LYSIS ADHESIONS N/A 12/29/2015    Procedure: LAPAROSCOPIC LYSIS ADHESIONS;  Surgeon: Aaron Early MD;  [facility]    PANCREATECTOMY, TRANSPLANT AUTO ISLET CELL, COMBINED  05/10/2013    Procedure: COMBINED PANCREATECTOMY, TRANSPLANT AUTO ISLET CELL;  Pancreatectomy, Auto Islet Cell Transplant   hernia repair, jejunostomy tube and liver biopsies with Anesthesia General with block;  Surgeon: Aaron Early MD;  [facility]    Partial ileum resection  1992    RECTOPEXY ABDOMINAL N/A 02/01/2022    Procedure: RECTOPEXY, ABDOMINAL;  Surgeon: Uriah Sheridan MD;  [facility]    REPAIR PTOSIS BROW BILATERAL Bilateral 06/09/2020    Procedure: BILATERAL BROW PTOSIS REPAIR;  Surgeon: Denise Alberts MD;  Location: [facility]    SACROCOLPOPEXY, CYSTOSCOPY, COMBINED N/A 02/01/2022    Procedure: Uterosacral ligament suspension, pina colposuspension with Cystoscopy;  Surgeon: Nicole Rivera MD;  [facility]    SIGMOIDOSCOPY FLEXIBLE N/A 02/01/2022    Procedure: SIGMOIDOSCOPY, FLEXIBLE;  Surgeon: Uriah Sheridan MD;  [facility]    Surgery for SBO  2015    TONSILLECTOMY, ADENOIDECTOMY, COMBINED   1997    TRANSPLANT  5/10/13    Pancreatic Auto-Islet Transplant       Social  Disability  Rescues dogs  Social History     Socioeconomic History    Marital status:      Spouse name: Tera    Number of children: 2    Years of education: Not on file    Highest education level: Some college, no degree   Occupational History    Occupation: customer service     Employer: [employer]   Tobacco Use    Smoking status: Never    Smokeless tobacco: Never   Vaping Use    Vaping Use: Never used   Substance and Sexual Activity    Alcohol use: Not Currently    Drug use: Not Currently    Sexual activity: Yes     Partners: Male     Birth control/protection: Post-menopausal   Other Topics Concern    Parent/sibling w/ CABG, MI or angioplasty before 65F 55M? No   Social History Narrative    Not on file     Social Determinants of Health     Financial Resource Strain: Medium Risk (12/9/2022)    Overall Financial Resource Strain (CARDIA)     Difficulty of Paying Living Expenses: Somewhat hard   Food Insecurity: No Food Insecurity (12/9/2022)    Hunger Vital Sign     Worried About Running Out of Food in the Last Year: Never true     Ran Out of Food in the Last Year: Never true   Transportation Needs: No Transportation Needs (12/9/2022)    PRAPARE - Transportation     Lack of Transportation (Medical): No     Lack of Transportation (Non-Medical): No   Physical Activity: Insufficiently Active (12/9/2022)    Exercise Vital Sign     Days of Exercise per Week: 2 days     Minutes of Exercise per Session: 30 min   Stress: Stress Concern Present (12/9/2022)    [place] [employer] - Occupational Stress Questionnaire     Feeling of Stress : To some extent   Social Connections: Socially Integrated (12/9/2022)    Social Connection and Isolation Panel [NHANES]     Frequency of Communication with Friends and Family: Twice a week     Frequency of Social Gatherings with Friends and Family: Once a week     Attends Orthodoxy  Services: More than 4 times per year     Active Member of Clubs or Organizations: Yes     Attends Club or Organization Meetings: Not on file     Marital Status:    Intimate Partner Violence: Not At Risk (9/9/2021)    Humiliation, Afraid, Rape, and Kick questionnaire     Fear of Current or Ex-Partner: No     Emotionally Abused: No     Physically Abused: No     Sexually Abused: No   Housing Stability: Low Risk  (12/9/2022)    Housing Stability Vital Sign     Unable to Pay for Housing in the Last Year: No     Number of Places Lived in the Last Year: 1     Unstable Housing in the Last Year: No     Family history  + kidney stones  No kidney failure       MEDICATIONS:  Current Outpatient Medications   Medication Sig Dispense Refill    calcium carbonate 600 mg-vitamin D 400 units (CALTRATE) 600-400 MG-UNIT per tablet Take 1 tablet by mouth daily      Continuous Blood Gluc Sensor (DEXCOM G6 SENSOR) MISC CHANGE EVERY 10 DAYS. 3 each 5    Continuous Blood Gluc Transmit (DEXCOM G6 TRANSMITTER) MISC CHANGE EVERY 3 MONTHS. 1 each 1    DULoxetine (CYMBALTA) 20 MG capsule Take 20 mg by mouth daily      escitalopram (LEXAPRO) 20 MG tablet Take 20 mg by mouth daily      estradiol (ESTRACE) 0.1 MG/GM vaginal cream Place vaginally twice a week PLACE 2 GRAMS VAGINALLY 2 TIMES WEEKLY (Patient taking differently: Place vaginally twice a week PLACE 2 GRAMS VAGINALLY 2 TIMES WEEKLY (Tues & Fri)) 42.5 g 11    famotidine (PEPCID) 40 MG tablet Take 1 tablet (40 mg) by mouth 2 times daily as needed for heartburn 180 tablet 3    FIASP PENFILL 100 UNIT/ML SOCT Inject 0.5-4 Units Subcutaneous 4 times daily, INJECT with meals and nightly 15 mL 0    gabapentin (NEURONTIN) 300 MG capsule Take 300 mg by mouth nightly as needed      Glucagon (GVOKE HYPOPEN 1-PACK) 1 MG/0.2ML SOAJ Inject 1 mg Subcutaneous once as needed (for hypoglycemia with loss of consciousness) 15 mL 5    glucose 40 % GEL Take 15-30 g by mouth every 15 minutes as needed. 1  Tube 11    hydrOXYzine (ATARAX) 25 MG tablet TAKE 1-2 TABLETS BY MOUTH 2 TIMES DAILY AS NEEDED FOR ANXIETY  0    Insulin Aspart, w/Niacinamide, (FIASP) 100 UNIT/ML SOLN 15 Units by Subcutaneous Infusion route daily Up to 85 units in continuous insulin pump 90 mL 1    Insulin Disposable Pump (OMNIPOD 5 G6 INTRO, GEN 5,) KIT 1 each continuous 1 kit 0    Insulin Disposable Pump (OMNIPOD 5 G6 POD, GEN 5,) MISC 1 each every 3 days 30 each 5    insulin glargine (LANTUS PEN) 100 UNIT/ML pen Inject 6 units daily in the event of pump failure 15 mL 0    insulin pen needle (ULTICARE MICRO) 32G X 4 MM miscellaneous USE TO INJECT 4-6 TIMES DAILY 200 each 0    INSULIN PUMP - OUTPATIENT Inject Subcutaneous continuous Date Last Updated:   Omnipod, type of insulin: Fiasp  Basal Rates in units/hr  0905-0944: 0.15   0421-6349: 0.25  ICF (insulin to carb ratio): 20 (1 unit covers 20g carbs)  ISF (insulin sensitivity factor): 140-170 (1 unit lowers BG by 140-170mg/dL)- very sernsitive  Target B-130 mg/dL  Active Insulin Time: 3 hours      levothyroxine (SYNTHROID/LEVOTHROID) 150 MCG tablet Take 1 tablet (150 mcg) by mouth daily 30 tablet 11    lipase-protease-amylase (VIOKACE) 87099-28216-73264 units TABS tablet Take 5 with meals and 3 with snacks; approximately daily maximum of 20 capsules 500 tablet 11    loratadine (CLARITIN) 10 MG tablet Take 10 mg by mouth daily as needed       omeprazole (PRILOSEC) 40 MG DR capsule Take 1 capsule (40 mg) by mouth 2 times daily Take 30-60 minutes before a meal. 180 capsule 3    potassium citrate (UROCIT-K) 10 MEQ (1080 MG) CR tablet Take 1 tablet (10 mEq) by mouth 3 times daily (with meals) 60 tablet 11    traZODone (DESYREL) 50 MG tablet Take 50 mg by mouth nightly as needed for sleep          ALLERGIES:    Allergies   Allergen Reactions    Corticosteroids Other (See Comments)     All oral, IV and injectable steroids are contraindicated (unless in life threatening situations) in Islet  Auto transplant recipients. They can cause irreversible loss of islet cell function. Please contact patient's transplant care coordinator, Erlinda Multani RN BSN at [phone]/pager: [phone] and endocrinologist prior to administration.      Povidone Iodine Hives     Causes skin to blister    Nsaids      naprosyn = GI upset    Sulfasalazine Nausea and Nausea and Vomiting         PHYSICAL EXAM:   video visit  GENERAL APPEARANCE: alert and no distress  RESP: normal work of breathing, no conversational dyspnea  NEURO: mentation intact and speech normal    LABS:   I reviewed:  Electrolytes/Renal -   Recent Labs   Lab Test 07/03/23  0725 07/01/23  0618 06/30/23 2019 04/03/23  1445 01/05/23  1342 02/04/22  0737 02/04/22  0726 02/03/22  2130 02/03/22  2056 02/03/22  0741 02/03/22  0730    139 135*   < > 141   < >  --   --   --   --   --    POTASSIUM 4.1 4.2 4.2   < > 4.2   < > 4.2  --   --   --  3.9   CHLORIDE 106 108* 100   < > 106   < >  --   --   --   --   --    CO2 28 23 24   < > 21*   < >  --   --   --   --   --    BUN 13.4 16.3 22.5   < > 21.9   < >  --   --   --   --   --    CR 0.88 0.90 0.97*   < > 0.94   < >  --   --   --   --   --    * 101* 338*   < > 111*   < >  --    < >  --    < >  --    VALARIE 9.2 8.5* 9.1   < > 9.0   < >  --   --   --   --   --    MAG  --   --   --   --  1.8  --  1.6  --   --   --  1.8   PHOS  --   --   --   --  4.2  --   --   --  2.7  --  1.8*    < > = values in this interval not displayed.       CBC -   Recent Labs   Lab Test 08/11/23  1037 07/03/23 0725 07/01/23 0618   WBC 7.0 8.6 13.2*   HGB 11.4* 10.8* 10.2*   * 478* 347       LFTs -   Recent Labs   Lab Test 06/30/23  2019 04/03/23  1445 01/05/23  1342   ALKPHOS 132* 122* 111*   BILITOTAL 0.3 0.2 0.2   ALT 21 30 49*   AST 23 30 46*   PROTTOTAL 6.9 6.7 6.3*   ALBUMIN 3.8 4.3 4.0       Coags -   Recent Labs   Lab Test 05/10/16  2019   INR 1.03       Iron Panel -   Recent Labs   Lab Test 08/11/23  1037  03/24/23  1325 01/05/23  1342   IRON 38 118 90   IRONSAT 12* 38 29   GRISELDA 51 44 34       Endocrine -   Recent Labs   Lab Test 04/03/23  1445 03/24/23  1325 01/19/23  1304 01/05/23  1342 09/15/22  0948 09/03/21  1239 08/17/21  1131   A1C 6.6*  --  6.5*  --  7.7*   < >  --    TSH  --  0.50  --  6.56*  --   --  2.75    < > = values in this interval not displayed.       IMAGING:  reviewed     Hannah Murcia MD

## 2023-08-15 NOTE — PROGRESS NOTES
Virtual Visit Details    Type of service:  Video Visit   Joined the video at 8/15/2023, 3:08:48 pm.  Left the video at 8/15/2023, 3:20:31 pm.    Originating Location (pt. Location): Home    Distant Location (provider location):  On-site  Platform used for Video Visit: Lizbeth

## 2023-08-17 ENCOUNTER — INFUSION THERAPY VISIT (OUTPATIENT)
Dept: INFUSION THERAPY | Facility: CLINIC | Age: 58
End: 2023-08-17
Attending: INTERNAL MEDICINE
Payer: COMMERCIAL

## 2023-08-17 VITALS
TEMPERATURE: 98 F | DIASTOLIC BLOOD PRESSURE: 83 MMHG | HEART RATE: 66 BPM | OXYGEN SATURATION: 97 % | SYSTOLIC BLOOD PRESSURE: 116 MMHG

## 2023-08-17 DIAGNOSIS — D50.8 OTHER IRON DEFICIENCY ANEMIA: Primary | ICD-10-CM

## 2023-08-17 PROCEDURE — 96374 THER/PROPH/DIAG INJ IV PUSH: CPT

## 2023-08-17 PROCEDURE — 250N000011 HC RX IP 250 OP 636: Mod: JZ | Performed by: INTERNAL MEDICINE

## 2023-08-17 PROCEDURE — 258N000003 HC RX IP 258 OP 636: Performed by: INTERNAL MEDICINE

## 2023-08-17 RX ORDER — MEPERIDINE HYDROCHLORIDE 25 MG/ML
25 INJECTION INTRAMUSCULAR; INTRAVENOUS; SUBCUTANEOUS EVERY 30 MIN PRN
Status: CANCELLED | OUTPATIENT
Start: 2023-08-19

## 2023-08-17 RX ORDER — HEPARIN SODIUM (PORCINE) LOCK FLUSH IV SOLN 100 UNIT/ML 100 UNIT/ML
5 SOLUTION INTRAVENOUS
Status: CANCELLED | OUTPATIENT
Start: 2023-08-19

## 2023-08-17 RX ORDER — ALBUTEROL SULFATE 0.83 MG/ML
2.5 SOLUTION RESPIRATORY (INHALATION)
Status: CANCELLED | OUTPATIENT
Start: 2023-08-19

## 2023-08-17 RX ORDER — METHYLPREDNISOLONE SODIUM SUCCINATE 125 MG/2ML
125 INJECTION, POWDER, LYOPHILIZED, FOR SOLUTION INTRAMUSCULAR; INTRAVENOUS
Status: CANCELLED
Start: 2023-08-19

## 2023-08-17 RX ORDER — HEPARIN SODIUM,PORCINE 10 UNIT/ML
5-20 VIAL (ML) INTRAVENOUS DAILY PRN
Status: CANCELLED | OUTPATIENT
Start: 2023-08-19

## 2023-08-17 RX ORDER — DIPHENHYDRAMINE HYDROCHLORIDE 50 MG/ML
50 INJECTION INTRAMUSCULAR; INTRAVENOUS
Status: CANCELLED
Start: 2023-08-19

## 2023-08-17 RX ORDER — EPINEPHRINE 1 MG/ML
0.3 INJECTION, SOLUTION INTRAMUSCULAR; SUBCUTANEOUS EVERY 5 MIN PRN
Status: CANCELLED | OUTPATIENT
Start: 2023-08-19

## 2023-08-17 RX ORDER — ALBUTEROL SULFATE 90 UG/1
1-2 AEROSOL, METERED RESPIRATORY (INHALATION)
Status: CANCELLED
Start: 2023-08-19

## 2023-08-17 RX ADMIN — SODIUM CHLORIDE 250 ML: 9 INJECTION, SOLUTION INTRAVENOUS at 08:18

## 2023-08-17 RX ADMIN — FERUMOXYTOL 510 MG: 510 INJECTION INTRAVENOUS at 08:26

## 2023-08-17 NOTE — PROGRESS NOTES
Infusion Nursing Note:  Lynn Thompson presents today for Feraheme #1/2.    Patient seen by provider today: No   present during visit today: Not Applicable.    Note: Pt has had iron infusions in the past and tolerated well. Last had Injectafer in 2021 without incident  Reviewed potential side effects and need for observation post infusion.      Intravenous Access:  Peripheral IV placed.    Treatment Conditions:  Results reviewed, labs MET treatment parameters, ok to proceed with treatment.  Iron studies noted 8/11/23.      Post Infusion Assessment:  Patient tolerated infusion without incident.  Pt observed for 30 minutes post Feraheme per protocol  Site patent and intact, free from redness, edema or discomfort.  No evidence of extravasations.  Access discontinued per protocol.       Discharge Plan:   AVS to patient via MYCHART.  Patient will return 8/25/23 for Feraheme #2/2 for next appointment.   Patient discharged in stable condition accompanied by: self.  Departure Mode: Ambulatory.      Lilia Shukla RN,

## 2023-08-21 ENCOUNTER — TELEPHONE (OUTPATIENT)
Dept: ONCOLOGY | Facility: CLINIC | Age: 58
End: 2023-08-21
Payer: COMMERCIAL

## 2023-08-21 NOTE — TELEPHONE ENCOUNTER
Patient received an iron infusion on 8/17 and by that evening she could barely walk because she was having so much bone pain. It is located in her hips and her legs. It doesn't help to reposition. She has been using heating pads and taking Tylenol with little relief. Rates the pain 6/10. It is a sharp but achy pain. It is constant. She doesn't recall having this pain after past iron infusions.     Will route to Dr. Montero for recommendations.    Nilam Vickers, RN  Triage Nurse Advisor  [facility]

## 2023-08-22 DIAGNOSIS — D50.9 IRON DEFICIENCY ANEMIA, UNSPECIFIED IRON DEFICIENCY ANEMIA TYPE: Primary | ICD-10-CM

## 2023-08-22 RX ORDER — TRAMADOL HYDROCHLORIDE 50 MG/1
50 TABLET ORAL EVERY 6 HOURS PRN
Qty: 20 TABLET | Refills: 0 | Status: SHIPPED | OUTPATIENT
Start: 2023-08-22 | End: 2024-03-04

## 2023-08-22 NOTE — TELEPHONE ENCOUNTER
No further action required from triage. Will close encounter.     Liz Busch, RN, BSN, PHN, OCN  M.[facility]    Haydee Krishnamurthy PA-C You; Nico Montero MD; Payal Aiken RN5 minutes ago (12:15 PM)     SH  Called patient and developed plan. She is unable to take steroids or NSAIDs. She will increase histamine blockade to BID and I provided Rx for Tramadol #20. For next infusion, she does have Pepcid and Benadryl standardly ordered. She is okay with continuing as scheduled.

## 2023-08-22 NOTE — PROGRESS NOTES
Patient with significant body aches following Feraheme on 8/17. She is unable to take Ibuprofen or steroids. She will continue Tylenol PRN. Provided Rx for Tramadol PRN for severe pain. She takes Claritin daily and will add histamine blocker in the evening as well.

## 2023-08-25 ENCOUNTER — INFUSION THERAPY VISIT (OUTPATIENT)
Dept: INFUSION THERAPY | Facility: CLINIC | Age: 58
End: 2023-08-25
Attending: INTERNAL MEDICINE
Payer: COMMERCIAL

## 2023-08-25 VITALS
DIASTOLIC BLOOD PRESSURE: 71 MMHG | HEART RATE: 77 BPM | SYSTOLIC BLOOD PRESSURE: 116 MMHG | RESPIRATION RATE: 16 BRPM | OXYGEN SATURATION: 98 % | TEMPERATURE: 98.5 F

## 2023-08-25 DIAGNOSIS — D50.8 OTHER IRON DEFICIENCY ANEMIA: Primary | ICD-10-CM

## 2023-08-25 PROCEDURE — 258N000003 HC RX IP 258 OP 636: Performed by: INTERNAL MEDICINE

## 2023-08-25 PROCEDURE — 250N000011 HC RX IP 250 OP 636: Mod: JZ | Performed by: INTERNAL MEDICINE

## 2023-08-25 PROCEDURE — 96365 THER/PROPH/DIAG IV INF INIT: CPT

## 2023-08-25 RX ORDER — MEPERIDINE HYDROCHLORIDE 25 MG/ML
25 INJECTION INTRAMUSCULAR; INTRAVENOUS; SUBCUTANEOUS EVERY 30 MIN PRN
Status: CANCELLED | OUTPATIENT
Start: 2023-08-25

## 2023-08-25 RX ORDER — HEPARIN SODIUM (PORCINE) LOCK FLUSH IV SOLN 100 UNIT/ML 100 UNIT/ML
5 SOLUTION INTRAVENOUS
Status: CANCELLED | OUTPATIENT
Start: 2023-08-25

## 2023-08-25 RX ORDER — HEPARIN SODIUM,PORCINE 10 UNIT/ML
5-20 VIAL (ML) INTRAVENOUS DAILY PRN
Status: CANCELLED | OUTPATIENT
Start: 2023-08-25

## 2023-08-25 RX ORDER — DIPHENHYDRAMINE HYDROCHLORIDE 50 MG/ML
50 INJECTION INTRAMUSCULAR; INTRAVENOUS
Status: CANCELLED
Start: 2023-08-25

## 2023-08-25 RX ORDER — ALBUTEROL SULFATE 90 UG/1
1-2 AEROSOL, METERED RESPIRATORY (INHALATION)
Status: CANCELLED
Start: 2023-08-25

## 2023-08-25 RX ORDER — ALBUTEROL SULFATE 0.83 MG/ML
2.5 SOLUTION RESPIRATORY (INHALATION)
Status: CANCELLED | OUTPATIENT
Start: 2023-08-25

## 2023-08-25 RX ORDER — EPINEPHRINE 1 MG/ML
0.3 INJECTION, SOLUTION INTRAMUSCULAR; SUBCUTANEOUS EVERY 5 MIN PRN
Status: CANCELLED | OUTPATIENT
Start: 2023-08-25

## 2023-08-25 RX ORDER — METHYLPREDNISOLONE SODIUM SUCCINATE 125 MG/2ML
125 INJECTION, POWDER, LYOPHILIZED, FOR SOLUTION INTRAMUSCULAR; INTRAVENOUS
Status: CANCELLED
Start: 2023-08-25

## 2023-08-25 RX ADMIN — FERUMOXYTOL 510 MG: 510 INJECTION INTRAVENOUS at 13:26

## 2023-08-25 RX ADMIN — SODIUM CHLORIDE 250 ML: 9 INJECTION, SOLUTION INTRAVENOUS at 13:26

## 2023-08-25 NOTE — PROGRESS NOTES
Infusion Nursing Note:  Lynn Thompson presents today for Feraheme infusion.    Patient seen by provider today: No   present during visit today: Not Applicable.    Note: Pt presents today for 2/2 Feraheme infusion.  Notes some bone pain, particularly in hips, from prior infusion that has improved.  RN and PA were contacted by patient regarding this pain, plan to continue with OTC PRN medications as directed after infusions.  Pt has had a significant reduction in fatigue with prior infusion.        Intravenous Access:  Peripheral IV placed.    Treatment Conditions:  Not Applicable.      Post Infusion Assessment:  Patient tolerated infusion without incident.  Patient observed for 30 minutes post infusion per protocol.  Blood return noted pre and post infusion.  Site patent and intact, free from redness, edema or discomfort.  No evidence of extravasations.  Access discontinued per protocol.       Discharge Plan:   Discharge instructions reviewed with: Patient.  AVS to patient via betNOW.  Patient will return 11/10/23 for labs and 3 month follow up for next appointment.   Patient discharged in stable condition accompanied by: self.  Departure Mode: Ambulatory.      Alia Morales RN

## 2023-09-05 DIAGNOSIS — K90.9 INTESTINAL MALABSORPTION, UNSPECIFIED: ICD-10-CM

## 2023-09-05 DIAGNOSIS — E89.1 POST-PANCREATECTOMY DIABETES (H): ICD-10-CM

## 2023-09-05 DIAGNOSIS — E13.9 POST-PANCREATECTOMY DIABETES (H): ICD-10-CM

## 2023-09-05 DIAGNOSIS — Z90.410 POST-PANCREATECTOMY DIABETES (H): ICD-10-CM

## 2023-09-05 DIAGNOSIS — K86.89 PANCREATIC INSUFFICIENCY: Primary | ICD-10-CM

## 2023-09-07 ENCOUNTER — ALLIED HEALTH/NURSE VISIT (OUTPATIENT)
Dept: TRANSPLANT | Facility: CLINIC | Age: 58
End: 2023-09-07
Attending: PEDIATRICS
Payer: COMMERCIAL

## 2023-09-07 ENCOUNTER — LAB (OUTPATIENT)
Dept: LAB | Facility: CLINIC | Age: 58
End: 2023-09-07
Payer: COMMERCIAL

## 2023-09-07 VITALS
HEART RATE: 76 BPM | RESPIRATION RATE: 16 BRPM | DIASTOLIC BLOOD PRESSURE: 67 MMHG | BODY MASS INDEX: 22.48 KG/M2 | OXYGEN SATURATION: 98 % | SYSTOLIC BLOOD PRESSURE: 106 MMHG | WEIGHT: 130.95 LBS

## 2023-09-07 VITALS — BODY MASS INDEX: 22.48 KG/M2 | WEIGHT: 130.95 LBS

## 2023-09-07 DIAGNOSIS — E89.1 POST-PANCREATECTOMY DIABETES (H): Primary | ICD-10-CM

## 2023-09-07 DIAGNOSIS — Z90.410 POST-PANCREATECTOMY DIABETES (H): Primary | ICD-10-CM

## 2023-09-07 DIAGNOSIS — A41.51 SEPSIS DUE TO ESCHERICHIA COLI WITHOUT ACUTE ORGAN DYSFUNCTION (H): Primary | ICD-10-CM

## 2023-09-07 DIAGNOSIS — Z90.410 POST-PANCREATECTOMY DIABETES (H): ICD-10-CM

## 2023-09-07 DIAGNOSIS — E89.1 POST-PANCREATECTOMY DIABETES (H): ICD-10-CM

## 2023-09-07 DIAGNOSIS — E13.9 POST-PANCREATECTOMY DIABETES (H): ICD-10-CM

## 2023-09-07 DIAGNOSIS — E13.9 POST-PANCREATECTOMY DIABETES (H): Primary | ICD-10-CM

## 2023-09-07 DIAGNOSIS — K90.9 INTESTINAL MALABSORPTION, UNSPECIFIED: ICD-10-CM

## 2023-09-07 DIAGNOSIS — K86.89 PANCREATIC INSUFFICIENCY: ICD-10-CM

## 2023-09-07 LAB
ALBUMIN SERPL BCG-MCNC: 4 G/DL (ref 3.5–5.2)
ALP SERPL-CCNC: 112 U/L (ref 35–104)
ALT SERPL W P-5'-P-CCNC: 69 U/L (ref 0–50)
ANION GAP SERPL CALCULATED.3IONS-SCNC: 10 MMOL/L (ref 7–15)
AST SERPL W P-5'-P-CCNC: 47 U/L (ref 0–45)
BASOPHILS # BLD AUTO: 0.1 10E3/UL (ref 0–0.2)
BASOPHILS NFR BLD AUTO: 2 %
BILIRUB SERPL-MCNC: 0.4 MG/DL
BUN SERPL-MCNC: 24.1 MG/DL (ref 8–23)
CALCIUM SERPL-MCNC: 9.6 MG/DL (ref 8.8–10.2)
CHLORIDE SERPL-SCNC: 105 MMOL/L (ref 98–107)
CHOLEST SERPL-MCNC: 174 MG/DL
CREAT SERPL-MCNC: 0.91 MG/DL (ref 0.51–0.95)
DEPRECATED HCO3 PLAS-SCNC: 28 MMOL/L (ref 22–29)
EGFRCR SERPLBLD CKD-EPI 2021: 72 ML/MIN/1.73M2
EOSINOPHIL # BLD AUTO: 0.2 10E3/UL (ref 0–0.7)
EOSINOPHIL NFR BLD AUTO: 3 %
ERYTHROCYTE [DISTWIDTH] IN BLOOD BY AUTOMATED COUNT: 16.5 % (ref 10–15)
FASTING STATUS PATIENT QL REPORTED: ABNORMAL
FASTING STATUS PATIENT QL REPORTED: YES
FASTING STATUS PATIENT QL REPORTED: YES
FERRITIN SERPL-MCNC: 737 NG/ML (ref 11–328)
GLUCOSE SERPL-MCNC: 183 MG/DL (ref 70–99)
GLUCOSE SERPL-MCNC: 183 MG/DL (ref 70–99)
GLUCOSE SERPL-MCNC: 209 MG/DL (ref 70–99)
GLUCOSE SERPL-MCNC: 344 MG/DL (ref 70–99)
HBA1C MFR BLD: 7.1 %
HCT VFR BLD AUTO: 36.8 % (ref 35–47)
HDLC SERPL-MCNC: 88 MG/DL
HGB BLD-MCNC: 12 G/DL (ref 11.7–15.7)
IMM GRANULOCYTES # BLD: 0 10E3/UL
IMM GRANULOCYTES NFR BLD: 0 %
IRON BINDING CAPACITY (ROCHE): 226 UG/DL (ref 240–430)
IRON SATN MFR SERPL: 49 % (ref 15–46)
IRON SERPL-MCNC: 110 UG/DL (ref 37–145)
LDLC SERPL CALC-MCNC: 72 MG/DL
LYMPHOCYTES # BLD AUTO: 1.5 10E3/UL (ref 0.8–5.3)
LYMPHOCYTES NFR BLD AUTO: 24 %
MCH RBC QN AUTO: 29 PG (ref 26.5–33)
MCHC RBC AUTO-ENTMCNC: 32.6 G/DL (ref 31.5–36.5)
MCV RBC AUTO: 89 FL (ref 78–100)
MONOCYTES # BLD AUTO: 0.7 10E3/UL (ref 0–1.3)
MONOCYTES NFR BLD AUTO: 11 %
NEUTROPHILS # BLD AUTO: 3.8 10E3/UL (ref 1.6–8.3)
NEUTROPHILS NFR BLD AUTO: 60 %
NONHDLC SERPL-MCNC: 86 MG/DL
NRBC # BLD AUTO: 0 10E3/UL
NRBC BLD AUTO-RTO: 0 /100
PLATELET # BLD AUTO: 409 10E3/UL (ref 150–450)
POTASSIUM SERPL-SCNC: 4.3 MMOL/L (ref 3.4–5.3)
PREALB SERPL IA-MCNC: 18 MG/DL (ref 15–45)
PROT SERPL-MCNC: 6.7 G/DL (ref 6.4–8.3)
RBC # BLD AUTO: 4.14 10E6/UL (ref 3.8–5.2)
SODIUM SERPL-SCNC: 143 MMOL/L (ref 136–145)
TRIGL SERPL-MCNC: 71 MG/DL
VIT B12 SERPL-MCNC: 330 PG/ML (ref 232–1245)
WBC # BLD AUTO: 6.3 10E3/UL (ref 4–11)

## 2023-09-07 PROCEDURE — 83036 HEMOGLOBIN GLYCOSYLATED A1C: CPT | Performed by: PEDIATRICS

## 2023-09-07 PROCEDURE — 83540 ASSAY OF IRON: CPT | Performed by: PATHOLOGY

## 2023-09-07 PROCEDURE — 82607 VITAMIN B-12: CPT | Performed by: PEDIATRICS

## 2023-09-07 PROCEDURE — 84590 ASSAY OF VITAMIN A: CPT | Mod: 90 | Performed by: PATHOLOGY

## 2023-09-07 PROCEDURE — 84446 ASSAY OF VITAMIN E: CPT | Mod: 90 | Performed by: PATHOLOGY

## 2023-09-07 PROCEDURE — 82728 ASSAY OF FERRITIN: CPT | Performed by: PATHOLOGY

## 2023-09-07 PROCEDURE — 99211 OFF/OP EST MAY X REQ PHY/QHP: CPT

## 2023-09-07 PROCEDURE — 99000 SPECIMEN HANDLING OFFICE-LAB: CPT | Performed by: PATHOLOGY

## 2023-09-07 PROCEDURE — 82542 COL CHROMOTOGRAPHY QUAL/QUAN: CPT | Mod: 90 | Performed by: PATHOLOGY

## 2023-09-07 PROCEDURE — 36415 COLL VENOUS BLD VENIPUNCTURE: CPT | Performed by: PATHOLOGY

## 2023-09-07 PROCEDURE — 80053 COMPREHEN METABOLIC PANEL: CPT | Performed by: PATHOLOGY

## 2023-09-07 PROCEDURE — 84681 ASSAY OF C-PEPTIDE: CPT | Performed by: PEDIATRICS

## 2023-09-07 PROCEDURE — G0463 HOSPITAL OUTPT CLINIC VISIT: HCPCS

## 2023-09-07 PROCEDURE — 85025 COMPLETE CBC W/AUTO DIFF WBC: CPT | Performed by: PATHOLOGY

## 2023-09-07 PROCEDURE — 84134 ASSAY OF PREALBUMIN: CPT | Performed by: PEDIATRICS

## 2023-09-07 PROCEDURE — 84630 ASSAY OF ZINC: CPT | Mod: 90 | Performed by: PATHOLOGY

## 2023-09-07 PROCEDURE — 83550 IRON BINDING TEST: CPT | Performed by: PATHOLOGY

## 2023-09-07 PROCEDURE — 82306 VITAMIN D 25 HYDROXY: CPT | Performed by: PEDIATRICS

## 2023-09-07 PROCEDURE — 80061 LIPID PANEL: CPT | Performed by: PATHOLOGY

## 2023-09-07 PROCEDURE — G0463 HOSPITAL OUTPT CLINIC VISIT: HCPCS | Mod: 27 | Performed by: PEDIATRICS

## 2023-09-07 PROCEDURE — 99215 OFFICE O/P EST HI 40 MIN: CPT | Performed by: PEDIATRICS

## 2023-09-07 ASSESSMENT — PAIN SCALES - GENERAL: PAINLEVEL: NO PAIN (0)

## 2023-09-07 NOTE — NURSING NOTE
Chief Complaint   Patient presents with    Nurse Visit     Boost test.      Wt 59.4 kg (130 lb 15.3 oz)   LMP 12/19/2013   BMI 22.48 kg/m      Boost completed at 10:30am    Called lab and told them she needs her draws at 11:30am and 12:30pm.     Erin Gomez  RN, BSN  RN Clinic Coordinator:   [facility]  [facility] [place]   [email or web address]  Phone: [phone]   Fax: [phone]

## 2023-09-07 NOTE — NURSING NOTE
Chief Complaint   Patient presents with    Follow Up     Islet transplant 5/10/2013     /67 (BP Location: Left arm, Patient Position: Sitting, Cuff Size: Adult Regular)   Pulse 76   Resp 16   Wt 59.4 kg (130 lb 15.3 oz)   LMP 12/19/2013   SpO2 98%   BMI 22.48 kg/m      ESTEFANIA CORRALES, RN on 9/7/2023 at 1:12 PM

## 2023-09-07 NOTE — PATIENT INSTRUCTIONS
1)  Change insulin to carb ratio to 15 grams for post prandial highs.    2)  Referral to ID for recurrent sepsis post TPIAT (E coli, also in urine).     Follow up Jan 4 at 2:20

## 2023-09-07 NOTE — PROGRESS NOTES
[facility]  Islet Autotransplant, Diabetes Follow Up    Problem List:  Patient Active Problem List   Diagnosis    Iron deficiency anemia secondary to inadequate dietary iron intake    Gastric bypass status for obesity    Health Care Home    Chronic pain syndrome    Exocrine pancreatic insufficiency    Asplenia    BLU (obstructive sleep apnea)    H/O of rectopexy    Dysthymia    Anxiety    Thyroid nodule    Acquired hypothyroidism    Post-pancreatectomy diabetes (H)    Other iron deficiency anemia    Uterovaginal prolapse, unspecified    Rectal prolapse    Small bowel obstruction (H)    Urge incontinence    Urinary urgency    High-tone pelvic floor dysfunction    Pyuria    Recurrent kidney stones    History of uterine prolapse    Vaginal atrophy    Enteric hyperoxaluria    Hypocitraturia    Acute pyelonephritis    Acute sepsis (H)       HPI:  Lynn is a 58 year old female here for follow up o total pancreatectomy, islet cell autotransplant, splenectomy, liver biopsy (needle core), choledochoduodenostomy, feeding jejunostomy performed on 5/10/2013.  At the time of the procedure, the patient received 178,100 IEQ, or 3,209 IEQ/kg body weight. She has had two subsequent exploratory laparatomy for small bowel obstruction. Other notable endocrine history includes a complex cystic nodule in mid right thyroid lobe with 1 cm maximum diameter (saw Dr Adorno in 11/2016) and mild hypothyroidism followed by PCP, pathology in 2018 by FNA showed a benign nodule (includes adenomatoid nodule, colloid nodule, etc.).  She had an episode of cholangitis and was hospitalized for 4 days 8/24/17 (fevers, RUQ pain, + Ecoli blood cx).    She was on NO insulin at her 1 year, 2 year, 3 year, 4 year transplant anniversaries and restart insulin just after 4 years post-tx, insulin restart date of  6/6/2017.   She had a slow opioid wean off suboxone but eventually did come off.     Other history notable for  surgical procedure Admitted from 2/1- 2/5/22 for  1. Posterior suture rectopexy (Colorectal primary)  2. Sigmoidoscopy (Colorectal primary)  3. Supracervical hysterectomy with bilateral salpingooophrectomy (Uro)  4. Uterosacral ligament suspension (Uro)  5. Gan colposuspension (Uro)  6. Cystoscopy (Uro)        1)  Diabetes:  Lynn continues to have partial islet graft function.   She is on Fiasp right before meal time and has rapid gastric emptying.    Continues on Omnipod 5 and is very happy with this.   It has been a game changer for her, especially with lows at night.  No pump or CGM issues.  Main issue of note is that she is still having post prandial highs, improved somewhat with dose change to I:C last visit.     Pump, settings below.    Max Basal 1.0 unit(s)/hr     Basal Rates:  12a 0.15 unit(s)/hr  8a 0.25 units/hr      Target Glucose  12a-8a 130 Correct above 140  8a-12 a 120 Correct above 130     Sensitivity   12a -8a 170  8am-12a 140     Insulin to Carb Ratio  12 am 20 grams     Max Bolus 6  Active insulin time 3 hours  Reverse correction off        Total daily insulin dose= 11.2 units of Fiasp, of which 5.2 units per day is basal on pump, 6 units is bolus.          HbA1c levels:  Lab Results   Component Value Date    A1C 7.1 09/07/2023    A1C 6.6 04/03/2023    A1C 6.5 01/19/2023    A1C 7.7 09/15/2022    A1C 8.2 05/08/2022    A1C 6.8 05/10/2021    A1C 6.9 02/16/2021    A1C 6.7 08/20/2020    A1C 7.1 06/05/2020    A1C 6.9 02/06/2020           Hypoglycemia history:   Recent history as above.  The patient has had NO episodes of severe hypoglycemia (seizure, coma, or neuroglycopenic symptoms severe enough to require assistance from another person).  Blood sugars were reviewed from the patient records and/or the meter download.      Uses Dexcom G6- TIR up to 61% from 50% last time.  Time in tight range at  is 40% (was 38% in may)          Patient is good candidate for CGM therapy.  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   --  Require frequent adjustments of the insulin treatment regimen, based on therapeutic CGM test results      2)  Nutrition:  Weight has been stable, was a concern previously with weight loss.    Last nutritional studies:  Component      Latest Ref Rng & Units 9/6/2022 9/15/2022   Iron      37 - 145 ug/dL 69    Iron Sat Index      15 - 46 % 27    Iron Binding Capacity      240 - 430 ug/dL 255    Vitamin A      0.30 - 1.20 mg/L  0.49   Retinol Palmitate      0.00 - 0.10 mg/L  <0.02   Vitamin A Interp        Normal   25 OH Vit D2      ug/L  <5   25 OH Vit D3      ug/L  58   25 OH Vit D total      20 - 75 ug/L  <63   Vitamin E      5.5 - 18.0 mg/L  10.3   Vitamin E Gamma      0.0 - 6.0 mg/L  0.9   Vitamin B12      232 - 1,245 pg/mL  418       3)  Thyroid:  On levothyroxine 150 mcg daily, increased around time of last visit.  Most recent labs nl.  TSH   Date Value Ref Range Status   03/24/2023 0.50 0.30 - 4.20 uIU/mL Final   08/17/2021 2.75 0.40 - 4.00 mU/L Final   03/15/2021 2.93 0.40 - 4.00 mU/L Final     T4 Free   Date Value Ref Range Status   06/05/2020 1.05 0.76 - 1.46 ng/dL Final     Free T4   Date Value Ref Range Status   03/24/2023 1.26 0.90 - 1.70 ng/dL Final       4)  Hospitalized again at [facility] for UTI and E coli sepsis.  No UTI symptoms with these episodes.  Question of whether prophylaxis w/ abx should be considered.     Review of systems:  A complete Review Of Systems was performed but was negative except as noted in the HPI.    Past Medical and Surgical History:  Past Medical  History:   Diagnosis Date    Benign paroxysmal positional vertigo     occ.     Calculus of kidney 05/2005    x1 on L side passed, several stones.  Has been tested for oxalate.    Chronic abdominal pain 2013    Chronic pancreatitis 2013    Depression     also occ panic spells    Diabetes (H) 5/10/2013    Total Pancreatomy with Auto Islets Transplant    Dyspepsia 1999    H. pylori   treated    Headaches     still periodic HA's ;  often 5X/week    Hypertension 2016    Stress related    Iron deficiency anemia 2003    relates to gastric bypass    Post-pancreatectomy diabetes melltius 2013    Sleep apnea     uses splint    Spasm of sphincter of Oddi     surgical + endoscopic stenting of pancreatic duct [place] [facility]    Thyroid nodule 2016     Past Surgical History:   Procedure Laterality Date    ABDOMINOPLASTY  2002    Tummy tu    APPENDECTOMY  1990    BUNIONECTOMY Right 1998    CBD Stent placement  2002    CBD stent; Dr. Presley     SECTION      CHOLECYSTECTOMY      COLONOSCOPY N/A 2021    Procedure: COLONOSCOPY INCOMPLETE Aborted due to incomplete prep  will need to take additional prep and return tomorrow 21;  Surgeon: Ihsan Saenz MD;  Location: [facility]    COMBINED CYSTOSCOPY, RETROGRADES, URETEROSCOPY, LASER HOLMIUM LITHOTRIPSY URETER(S), INSERT STENT Right 2015    Procedure: COMBINED CYSTOSCOPY, RETROGRADES, URETEROSCOPY, LASER HOLMIUM LITHOTRIPSY URETER(S), INSERT STENT;  Surgeon: Kennedi Aldana MD;  Location: [facility]    COMBINED CYSTOSCOPY, RETROGRADES, URETEROSCOPY, LASER HOLMIUM LITHOTRIPSY URETER(S), INSERT STENT Right 2015    Procedure: COMBINED CYSTOSCOPY, RETROGRADES, URETEROSCOPY, LASER HOLMIUM LITHOTRIPSY URETER(S), INSERT STENT;  Surgeon: Kennedi Aldana MD;  Location: [facility]    COSMETIC SURGERY  2002    Tummy tuck    CYSTECTOMY OVARIAN BENIGN Right     CYSTOSCOPY, RETROGRADES, INSERT STENT  URETER(S), COMBINED  10/02/2012    Procedure: COMBINED CYSTOSCOPY, RETROGRADES, INSERT STENT URETER(S);  COMBINED CYSTOSCOPY,  , INSERT LEFT STENT URETER;  Surgeon: Johny Baez MD;  Location: [facility]    CYSTOSCOPY, RETROGRADES, INSERT STENT URETER(S), COMBINED Left 9/20/2022    Procedure: Cystoscopy with left retrograde pyelogram, left ureteroscopy, thulium laser lithotripsy of left ureteral calculus, stone basketing and left ureteral stent insertion;  Surgeon: Alonzo Rome MD;  Location: [facility]    ESOPHAGOSCOPY, GASTROSCOPY, DUODENOSCOPY (EGD), COMBINED N/A 01/24/2018    Procedure: COMBINED ESOPHAGOSCOPY, GASTROSCOPY, DUODENOSCOPY (EGD);  ESOPHAGOSCOPY, GASTROSCOPY, DUODENOSCOPY (EGD)    ;  Surgeon: Tamir Rodgers MD;  Location: [facility]    EXTRACORPOREAL SHOCK WAVE LITHOTRIPSY (ESWL)  10/16/2012    Procedure: EXTRACORPOREAL SHOCK WAVE LITHOTRIPSY (ESWL);  left EXTRACORPOREAL SHOCK WAVE LITHOTRIPSY (ESWL) ;  Surgeon: Johny Baez MD;  Location: [facility]    Gastric bypass NOS      HERNIA REPAIR  02/2015    HYSTERECTOMY SUPRACERVICAL, BILATERAL SALPINGO-OOPHORECTOMY, COMBINED N/A 02/01/2022    Procedure: Abdominal supracervical hysterectomy, bilateral salpingooophrectomy;  Surgeon: Nicole Rivera MD;  [facility]    IRRIGATION AND DEBRIDEMENT HAND, COMBINED Left 10/30/2020    Procedure: Left hand sharp excisional debridement of skin, subcutaneous tissue and fat with a scalpel, 2 x 1 x 1 cm.;  Surgeon: Demian Renteria MD;  Location: [facility]    LAPAROSCOPIC LYSIS ADHESIONS N/A 02/20/2015    Procedure: LAPAROSCOPIC LYSIS ADHESIONS;  Surgeon: Aaron Early MD;  [facility]    LAPAROSCOPIC LYSIS ADHESIONS N/A 12/29/2015    Procedure: LAPAROSCOPIC LYSIS ADHESIONS;  Surgeon: Aaron Early MD;  [facility]    PANCREATECTOMY, TRANSPLANT AUTO ISLET CELL, COMBINED  05/10/2013    Procedure: COMBINED PANCREATECTOMY, TRANSPLANT AUTO ISLET CELL;  Pancreatectomy, Auto Islet Cell Transplant    hernia repair, jejunostomy tube and liver biopsies with Anesthesia General with block;  Surgeon: Aaron Early MD;  [facility]    Partial ileum resection      RECTOPEXY ABDOMINAL N/A 2022    Procedure: RECTOPEXY, ABDOMINAL;  Surgeon: Uriah Sheridan MD;  [facility]    REPAIR PTOSIS BROW BILATERAL Bilateral 2020    Procedure: BILATERAL BROW PTOSIS REPAIR;  Surgeon: Denise Alberts MD;  Location: [facility]    SACROCOLPOPEXY, CYSTOSCOPY, COMBINED N/A 2022    Procedure: Uterosacral ligament suspension, pina colposuspension with Cystoscopy;  Surgeon: Nicole Rivera MD;  [facility]    SIGMOIDOSCOPY FLEXIBLE N/A 2022    Procedure: SIGMOIDOSCOPY, FLEXIBLE;  Surgeon: Uriah Sheridan MD;  [facility]    Surgery for SBO      TONSILLECTOMY, ADENOIDECTOMY, COMBINED  1997    TRANSPLANT  5/10/13    Pancreatic Auto-Islet Transplant       Family History:  New changes since last visit:  none  Family History   Problem Relation Age of Onset    Family History Negative Mother     Respiratory Father         COPD;  at 69    Genitourinary Problems Father         kidney stones    Substance Abuse Father     Depression Father     Asthma Father     Heart Disease Paternal Grandfather         M.I.    Coronary Artery Disease Paternal Grandfather     Hyperlipidemia Paternal Grandfather     Genitourinary Problems Brother         multiple brothers with kidney stones    Gastrointestinal Disease Maternal Grandmother         undiagnosed 'gut' issues    Coronary Artery Disease Maternal Grandfather     Hyperlipidemia Maternal Grandfather     Cerebrovascular Disease Paternal Grandmother         At the age of 103    Anxiety Disorder Paternal Grandmother     Osteoporosis Paternal Grandmother     Anxiety Disorder Son     Anxiety Disorder Daughter     Asthma Daughter     Colon Cancer No family hx of        Social History:  Social History     Social History Narrative     Not on file     Does not work currently.  Mom to many foster dogs     Physical Exam:  Vitals: /67 (BP Location: Left arm, Patient Position: Sitting, Cuff Size: Adult Regular)   Pulse 76   Resp 16   Wt 59.4 kg (130 lb 15.3 oz)   LMP 12/19/2013   SpO2 98%   BMI 22.48 kg/m    BMI= Body mass index is 22.48 kg/m .  General:  Appearance is normal, no acute distress  HEENT:  NC/AT, sclera appear white  Neck:  No thyroid nodule palpable.   Neck:  No obvious thyromegaly  CV/Lungs:  Non distressed breathing  Skin:  No apparent rashes.   Neuro:  Normal mental status  Psych:  Normal affect    Results.iatl  Mixed Meal Test:  C Peptide   Date Value Ref Range Status   09/07/2023 2.2 0.9 - 6.9 ng/mL Final   09/07/2023 1.8 0.9 - 6.9 ng/mL Final   09/15/2022 1.6 0.9 - 6.9 ng/mL Final   09/15/2022 1.2 0.9 - 6.9 ng/mL Final   09/15/2022 0.3 (L) 0.9 - 6.9 ng/mL Final   08/20/2020 3.4 0.9 - 6.9 ng/mL Final   08/20/2020 1.5 0.9 - 6.9 ng/mL Final   08/20/2020 0.5 (L) 0.9 - 6.9 ng/mL Final   05/16/2019 1.8 0.9 - 6.9 ng/mL Final   05/16/2019 1.5 0.9 - 6.9 ng/mL Final     Glucose   Date Value Ref Range Status   09/07/2023 209 (H) 70 - 99 mg/dL Final   09/07/2023 344 (H) 70 - 99 mg/dL Final   09/07/2023 183 (H) 70 - 99 mg/dL Final   09/07/2023 183 (H) 70 - 99 mg/dL Final   07/03/2023 130 (H) 70 - 99 mg/dL Final   06/14/2022 104 (H) 70 - 99 mg/dL Final   04/16/2022 241 (H) 70 - 99 mg/dL Final   02/01/2022 90 70 - 99 mg/dL Final   01/18/2022 123 (H) 70 - 99 mg/dL Final   11/26/2021 139 (H) 70 - 99 mg/dL Final   05/10/2021 169 (H) 70 - 99 mg/dL Final   03/01/2021 141 (H) 70 - 99 mg/dL Final   02/28/2021 120 (H) 70 - 99 mg/dL Final   02/16/2021 106 (H) 70 - 99 mg/dL Final   10/30/2020 126 (H) 70 - 99 mg/dL Final     GLUCOSE BY METER POCT   Date Value Ref Range Status   09/23/2022 216 (H) 70 - 99 mg/dL Final   09/23/2022 157 (H) 70 - 99 mg/dL Final   09/23/2022 192 (H) 70 - 99 mg/dL Final   09/23/2022 71 70 - 99 mg/dL Final   09/22/2022  303 (H) 70 - 99 mg/dL Final       Hemoglobin A1c  Lab Results   Component Value Date    A1C 7.1 09/07/2023    A1C 6.6 04/03/2023    A1C 6.5 01/19/2023    A1C 7.7 09/15/2022    A1C 8.2 05/08/2022    A1C 6.8 05/10/2021    A1C 6.9 02/16/2021    A1C 6.7 08/20/2020    A1C 7.1 06/05/2020    A1C 6.9 02/06/2020       Liver enzymes  Lab Results   Component Value Date    AST 47 09/07/2023    AST 24 05/10/2021     Lab Results   Component Value Date    ALT 69 09/07/2023    ALT 35 05/10/2021     Lab Results   Component Value Date    BILICONJ 0.0 05/12/2014      Lab Results   Component Value Date    BILITOTAL 0.4 09/07/2023    BILITOTAL 0.3 05/10/2021     Lab Results   Component Value Date    ALBUMIN 4.0 09/07/2023    ALBUMIN 3.2 04/16/2022    ALBUMIN 3.4 05/10/2021     Lab Results   Component Value Date    PROTTOTAL 6.7 09/07/2023    PROTTOTAL 6.9 05/10/2021      Lab Results   Component Value Date    ALKPHOS 112 09/07/2023    ALKPHOS 132 05/10/2021       Creatinine:  Creatinine   Date Value Ref Range Status   09/07/2023 0.91 0.51 - 0.95 mg/dL Final   05/10/2021 0.81 0.52 - 1.04 mg/dL Final   ]    Complete Blood Count:  CBC RESULTS:   Recent Labs   Lab Test 09/07/23 0930   WBC 6.3   RBC 4.14   HGB 12.0   HCT 36.8   MCV 89   MCH 29.0   MCHC 32.6   RDW 16.5*          Cholesterol  Lab Results   Component Value Date    CHOL 174 09/07/2023    CHOL 192 09/17/2020     Lab Results   Component Value Date    HDL 88 09/07/2023    HDL 91 09/17/2020     Lab Results   Component Value Date    LDL 72 09/07/2023    LDL 84 09/17/2020     Lab Results   Component Value Date    TRIG 71 09/07/2023    TRIG 83 09/17/2020     Lab Results   Component Value Date    CHOLHDLRATIO 1.9 11/20/2014           TSH   Date Value Ref Range Status   03/24/2023 0.50 0.30 - 4.20 uIU/mL Final   08/17/2021 2.75 0.40 - 4.00 mU/L Final   03/15/2021 2.93 0.40 - 4.00 mU/L Final     T4 Free   Date Value Ref Range Status   06/05/2020 1.05 0.76 - 1.46 ng/dL Final    01/22/2019 0.90 0.76 - 1.46 ng/dL Final   01/04/2018 0.72 (L) 0.76 - 1.46 ng/dL Final   03/06/2017 0.95 0.76 - 1.46 ng/dL Final   01/30/2017 0.78 0.76 - 1.46 ng/dL Final     Free T4   Date Value Ref Range Status   03/24/2023 1.26 0.90 - 1.70 ng/dL Final   01/05/2023 0.71 (L) 0.90 - 1.70 ng/dL Final   08/17/2021 1.06 0.76 - 1.46 ng/dL Final           Assessment:  1.  Post pancreatectomy diabetes mellitus, s/p total pancreatectomy and islet autotransplant.    Lynn is a 58 year old with history of chronic pancreatitis who is s/p total pancreatectomy and islet autotransplant.  She was insulin independent until 6/6/2017 (just after 4 years post-tx) and now has partial islet function.    She is on Fiasp due to rapid gastric emptying and post meal hypoglycemia.      She remains on the Omnipod 5.  This has been hugely helpful in avoiding lows at night.  We did change I:C ratio for post prandial highs, as A1c and TIR are not yet at goal.  Does have partial islet function.     Has recurrent sepsis with UTI (E coli).  This is not the typical 'asplenia' sepsis but agree w PCP recommendation to see ID>        Plan:  1.  Changes to current diabetes regimen:  Patient Instructions   1)  Change insulin to carb ratio to 15 grams for post prandial highs.    2)  Referral to ID for recurrent sepsis post TPIAT (E coli, also in urine).     Follow up Jan 4 at 2:20    2.  Frequency of blood sugar checks:  premeal and bedtime, and as needed for hypoglycemia (6 strip per day).    3.  Continue routine follow up for autoislet transplant patients:  Mixed meal test (6 mL/kg BoostHP to max of 360 mL) at 3 months, 6 months, and once a year post transplant.  Hemoglobin A1c levels at these time points and quarterly.    4.  Other issues addressed today:  As above    Follow up:  As above        Contact me for questions at [phone] or [phone].  Emergency number to reach pediatric endocrinology after hours is [phone].    Dr. Wright  MD Margaret  [facility]  Director of Research, Islet Auto-transplant Program  Phone:  [phone]  Electronically signed: September 8, 2023 at 10:00 AM          Review of the result(s) of each unique test - as indicated above  Prescription drug management  40 minutes spent on the date of the encounter doing chart review, history and exam, documentation and further activities per the note

## 2023-09-07 NOTE — LETTER
9/7/2023         RE: Lynn Thompson  [id number] [facility] [id number]        Dear Colleague,    Thank you for referring your patient, Lynn Thompson, to the [facility]. Please see a copy of my visit note below.      [facility]  Islet Autotransplant, Diabetes Follow Up    Problem List:  Patient Active Problem List   Diagnosis    Iron deficiency anemia secondary to inadequate dietary iron intake    Gastric bypass status for obesity    Health Care Home    Chronic pain syndrome    Exocrine pancreatic insufficiency    Asplenia    BLU (obstructive sleep apnea)    H/O of rectopexy    Dysthymia    Anxiety    Thyroid nodule    Acquired hypothyroidism    Post-pancreatectomy diabetes (H)    Other iron deficiency anemia    Uterovaginal prolapse, unspecified    Rectal prolapse    Small bowel obstruction (H)    Urge incontinence    Urinary urgency    High-tone pelvic floor dysfunction    Pyuria    Recurrent kidney stones    History of uterine prolapse    Vaginal atrophy    Enteric hyperoxaluria    Hypocitraturia    Acute pyelonephritis    Acute sepsis (H)       HPI:  Lynn is a 58 year old female here for follow up o total pancreatectomy, islet cell autotransplant, splenectomy, liver biopsy (needle core), choledochoduodenostomy, feeding jejunostomy performed on 5/10/2013.  At the time of the procedure, the patient received 178,100 IEQ, or 3,209 IEQ/kg body weight. She has had two subsequent exploratory laparatomy for small bowel obstruction. Other notable endocrine history includes a complex cystic nodule in mid right thyroid lobe with 1 cm maximum diameter (saw Dr Adorno in 11/2016) and mild hypothyroidism followed by PCP, pathology in 2018 by FNA showed a benign nodule (includes adenomatoid nodule, colloid nodule, etc.).  She had an episode of cholangitis and was hospitalized for 4 days 8/24/17 (fevers, RUQ pain, + Ecoli blood cx).    She was on  NO insulin at her 1 year, 2 year, 3 year, 4 year transplant anniversaries and restart insulin just after 4 years post-tx, insulin restart date of  6/6/2017.   She had a slow opioid wean off suboxone but eventually did come off.     Other history notable for surgical procedure Admitted from 2/1- 2/5/22 for  1. Posterior suture rectopexy (Colorectal primary)  2. Sigmoidoscopy (Colorectal primary)  3. Supracervical hysterectomy with bilateral salpingooophrectomy (Uro)  4. Uterosacral ligament suspension (Uro)  5. Gan colposuspension (Uro)  6. Cystoscopy (Uro)        1)  Diabetes:  Lynn continues to have partial islet graft function.   She is on Fiasp right before meal time and has rapid gastric emptying.    Continues on Omnipod 5 and is very happy with this.   It has been a game changer for her, especially with lows at night.  No pump or CGM issues.  Main issue of note is that she is still having post prandial highs, improved somewhat with dose change to I:C last visit.     Pump, settings below.    Max Basal 1.0 unit(s)/hr     Basal Rates:  12a 0.15 unit(s)/hr  8a 0.25 units/hr      Target Glucose  12a-8a 130 Correct above 140  8a-12 a 120 Correct above 130     Sensitivity   12a -8a 170  8am-12a 140     Insulin to Carb Ratio  12 am 20 grams     Max Bolus 6  Active insulin time 3 hours  Reverse correction off        Total daily insulin dose= 11.2 units of Fiasp, of which 5.2 units per day is basal on pump, 6 units is bolus.          HbA1c levels:  Lab Results   Component Value Date    A1C 7.1 09/07/2023    A1C 6.6 04/03/2023    A1C 6.5 01/19/2023    A1C 7.7 09/15/2022    A1C 8.2 05/08/2022    A1C 6.8 05/10/2021    A1C 6.9 02/16/2021    A1C 6.7 08/20/2020    A1C 7.1 06/05/2020    A1C 6.9 02/06/2020           Hypoglycemia history:   Recent history as above.  The patient has had NO episodes of severe hypoglycemia (seizure, coma, or neuroglycopenic symptoms severe enough to require assistance from another  person).  Blood sugars were reviewed from the patient records and/or the meter download.      Uses Dexcom G6- TIR up to 61% from 50% last time.  Time in tight range at  is 40% (was 38% in may)          Patient is good candidate for CGM therapy.  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   --  Require frequent adjustments of the insulin treatment regimen, based on therapeutic CGM test results      2)  Nutrition:  Weight has been stable, was a concern previously with weight loss.    Last nutritional studies:  Component      Latest Ref Rng & Units 9/6/2022 9/15/2022   Iron      37 - 145 ug/dL 69    Iron Sat Index      15 - 46 % 27    Iron Binding Capacity      240 - 430 ug/dL 255    Vitamin A      0.30 - 1.20 mg/L  0.49   Retinol Palmitate      0.00 - 0.10 mg/L  <0.02   Vitamin A Interp        Normal   25 OH Vit D2      ug/L  <5   25 OH Vit D3      ug/L  58   25 OH Vit D total      20 - 75 ug/L  <63   Vitamin E      5.5 - 18.0 mg/L  10.3   Vitamin E Gamma      0.0 - 6.0 mg/L  0.9   Vitamin B12      232 - 1,245 pg/mL  418       3)  Thyroid:  On levothyroxine 150 mcg daily, increased around time of last visit.  Most recent labs nl.  TSH   Date Value Ref Range Status   03/24/2023 0.50 0.30 - 4.20 uIU/mL Final   08/17/2021 2.75 0.40 - 4.00 mU/L Final   03/15/2021 2.93 0.40 - 4.00 mU/L Final     T4 Free   Date Value Ref Range Status   06/05/2020 1.05 0.76 - 1.46 ng/dL Final     Free T4   Date Value Ref Range Status   03/24/2023 1.26 0.90 - 1.70 ng/dL Final       4)  Hospitalized again at [facility] for UTI and E coli sepsis.  No  UTI symptoms with these episodes.  Question of whether prophylaxis w/ abx should be considered.     Review of systems:  A complete Review Of Systems was performed but was negative except as noted in the HPI.    Past Medical and Surgical History:  Past Medical History:   Diagnosis Date    Benign paroxysmal positional vertigo     occ.     Calculus of kidney 05/2005    x1 on L side passed, several stones.  Has been tested for oxalate.    Chronic abdominal pain 2013    Chronic pancreatitis 2013    Depression     also occ panic spells    Diabetes (H) 5/10/2013    Total Pancreatomy with Auto Islets Transplant    Dyspepsia 1999    H. pylori   treated    Headaches     still periodic HA's ;  often 5X/week    Hypertension 2016    Stress related    Iron deficiency anemia 2003    relates to gastric bypass    Post-pancreatectomy diabetes melltius 2013    Sleep apnea     uses splint    Spasm of sphincter of Oddi     surgical + endoscopic stenting of pancreatic duct @ [facility] 06    Thyroid nodule 2016     Past Surgical History:   Procedure Laterality Date    ABDOMINOPLASTY  2002    Tummy tuck    APPENDECTOMY      BUNIONECTOMY Right 1998    CBD Stent placement  2002    CBD stent; Dr. Presley     SECTION      CHOLECYSTECTOMY      COLONOSCOPY N/A 2021    Procedure: COLONOSCOPY INCOMPLETE Aborted due to incomplete prep  will need to take additional prep and return tomorrow 21;  Surgeon: Ihsan Saenz MD;  Location: [facility]    COMBINED CYSTOSCOPY, RETROGRADES, URETEROSCOPY, LASER HOLMIUM LITHOTRIPSY URETER(S), INSERT STENT Right 2015    Procedure: COMBINED CYSTOSCOPY, RETROGRADES, URETEROSCOPY, LASER HOLMIUM LITHOTRIPSY URETER(S), INSERT STENT;  Surgeon: Kennedi Aldana MD;  Location: [facility]    COMBINED CYSTOSCOPY, RETROGRADES, URETEROSCOPY, LASER HOLMIUM LITHOTRIPSY URETER(S), INSERT STENT Right 2015    Procedure: COMBINED CYSTOSCOPY,  RETROGRADES, URETEROSCOPY, LASER HOLMIUM LITHOTRIPSY URETER(S), INSERT STENT;  Surgeon: Kennedi Aldana MD;  Location: [facility]    COSMETIC SURGERY  6/24/2002    Tummy tuck    CYSTECTOMY OVARIAN BENIGN Right     CYSTOSCOPY, RETROGRADES, INSERT STENT URETER(S), COMBINED  10/02/2012    Procedure: COMBINED CYSTOSCOPY, RETROGRADES, INSERT STENT URETER(S);  COMBINED CYSTOSCOPY,  , INSERT LEFT STENT URETER;  Surgeon: Johny Baez MD;  Location: [facility]    CYSTOSCOPY, RETROGRADES, INSERT STENT URETER(S), COMBINED Left 9/20/2022    Procedure: Cystoscopy with left retrograde pyelogram, left ureteroscopy, thulium laser lithotripsy of left ureteral calculus, stone basketing and left ureteral stent insertion;  Surgeon: Alonzo Rome MD;  Location: [facility]    ESOPHAGOSCOPY, GASTROSCOPY, DUODENOSCOPY (EGD), COMBINED N/A 01/24/2018    Procedure: COMBINED ESOPHAGOSCOPY, GASTROSCOPY, DUODENOSCOPY (EGD);  ESOPHAGOSCOPY, GASTROSCOPY, DUODENOSCOPY (EGD)    ;  Surgeon: Tamir Rodgers MD;  Location: [facility]    EXTRACORPOREAL SHOCK WAVE LITHOTRIPSY (ESWL)  10/16/2012    Procedure: EXTRACORPOREAL SHOCK WAVE LITHOTRIPSY (ESWL);  left EXTRACORPOREAL SHOCK WAVE LITHOTRIPSY (ESWL) ;  Surgeon: Johny Baez MD;  Location: [facility]    Gastric bypass NOS      HERNIA REPAIR  02/2015    HYSTERECTOMY SUPRACERVICAL, BILATERAL SALPINGO-OOPHORECTOMY, COMBINED N/A 02/01/2022    Procedure: Abdominal supracervical hysterectomy, bilateral salpingooophrectomy;  Surgeon: Nicole Rivera MD;  [facility]    IRRIGATION AND DEBRIDEMENT HAND, COMBINED Left 10/30/2020    Procedure: Left hand sharp excisional debridement of skin, subcutaneous tissue and fat with a scalpel, 2 x 1 x 1 cm.;  Surgeon: Demian Renteria MD;  Location: [facility]    LAPAROSCOPIC LYSIS ADHESIONS N/A 02/20/2015    Procedure: LAPAROSCOPIC LYSIS ADHESIONS;  Surgeon: Araon Early MD;  [facility]    LAPAROSCOPIC LYSIS ADHESIONS N/A 12/29/2015    Procedure:  LAPAROSCOPIC LYSIS ADHESIONS;  Surgeon: Aaron Early MD;  [facility]    PANCREATECTOMY, TRANSPLANT AUTO ISLET CELL, COMBINED  05/10/2013    Procedure: COMBINED PANCREATECTOMY, TRANSPLANT AUTO ISLET CELL;  Pancreatectomy, Auto Islet Cell Transplant   hernia repair, jejunostomy tube and liver biopsies with Anesthesia General with block;  Surgeon: Aaron Early MD;  [facility]    Partial ileum resection      RECTOPEXY ABDOMINAL N/A 2022    Procedure: RECTOPEXY, ABDOMINAL;  Surgeon: Uriah Sheridan MD;  [facility]    REPAIR PTOSIS BROW BILATERAL Bilateral 2020    Procedure: BILATERAL BROW PTOSIS REPAIR;  Surgeon: Denise Alberts MD;  Location: [facility]    SACROCOLPOPEXY, CYSTOSCOPY, COMBINED N/A 2022    Procedure: Uterosacral ligament suspension, pina colposuspension with Cystoscopy;  Surgeon: Nicole Rivera MD;  [facility]    SIGMOIDOSCOPY FLEXIBLE N/A 2022    Procedure: SIGMOIDOSCOPY, FLEXIBLE;  Surgeon: Uriah Sheridan MD;  [facility]    Surgery for SBO      TONSILLECTOMY, ADENOIDECTOMY, COMBINED  1997    TRANSPLANT  5/10/13    Pancreatic Auto-Islet Transplant       Family History:  New changes since last visit:  none  Family History   Problem Relation Age of Onset    Family History Negative Mother     Respiratory Father         COPD;  at 69    Genitourinary Problems Father         kidney stones    Substance Abuse Father     Depression Father     Asthma Father     Heart Disease Paternal Grandfather         M.I.    Coronary Artery Disease Paternal Grandfather     Hyperlipidemia Paternal Grandfather     Genitourinary Problems Brother         multiple brothers with kidney stones    Gastrointestinal Disease Maternal Grandmother         undiagnosed 'gut' issues    Coronary Artery Disease Maternal Grandfather     Hyperlipidemia Maternal Grandfather     Cerebrovascular Disease Paternal Grandmother         At the age of 103     Anxiety Disorder Paternal Grandmother     Osteoporosis Paternal Grandmother     Anxiety Disorder Son     Anxiety Disorder Daughter     Asthma Daughter     Colon Cancer No family hx of        Social History:  Social History     Social History Narrative    Not on file     Does not work currently.  Mom to many foster dogs     Physical Exam:  Vitals: /67 (BP Location: Left arm, Patient Position: Sitting, Cuff Size: Adult Regular)   Pulse 76   Resp 16   Wt 59.4 kg (130 lb 15.3 oz)   LMP 12/19/2013   SpO2 98%   BMI 22.48 kg/m    BMI= Body mass index is 22.48 kg/m .  General:  Appearance is normal, no acute distress  HEENT:  NC/AT, sclera appear white  Neck:  No thyroid nodule palpable.   Neck:  No obvious thyromegaly  CV/Lungs:  Non distressed breathing  Skin:  No apparent rashes.   Neuro:  Normal mental status  Psych:  Normal affect    Results.iatl  Mixed Meal Test:  C Peptide   Date Value Ref Range Status   09/07/2023 2.2 0.9 - 6.9 ng/mL Final   09/07/2023 1.8 0.9 - 6.9 ng/mL Final   09/15/2022 1.6 0.9 - 6.9 ng/mL Final   09/15/2022 1.2 0.9 - 6.9 ng/mL Final   09/15/2022 0.3 (L) 0.9 - 6.9 ng/mL Final   08/20/2020 3.4 0.9 - 6.9 ng/mL Final   08/20/2020 1.5 0.9 - 6.9 ng/mL Final   08/20/2020 0.5 (L) 0.9 - 6.9 ng/mL Final   05/16/2019 1.8 0.9 - 6.9 ng/mL Final   05/16/2019 1.5 0.9 - 6.9 ng/mL Final     Glucose   Date Value Ref Range Status   09/07/2023 209 (H) 70 - 99 mg/dL Final   09/07/2023 344 (H) 70 - 99 mg/dL Final   09/07/2023 183 (H) 70 - 99 mg/dL Final   09/07/2023 183 (H) 70 - 99 mg/dL Final   07/03/2023 130 (H) 70 - 99 mg/dL Final   06/14/2022 104 (H) 70 - 99 mg/dL Final   04/16/2022 241 (H) 70 - 99 mg/dL Final   02/01/2022 90 70 - 99 mg/dL Final   01/18/2022 123 (H) 70 - 99 mg/dL Final   11/26/2021 139 (H) 70 - 99 mg/dL Final   05/10/2021 169 (H) 70 - 99 mg/dL Final   03/01/2021 141 (H) 70 - 99 mg/dL Final   02/28/2021 120 (H) 70 - 99 mg/dL Final   02/16/2021 106 (H) 70 - 99 mg/dL Final   10/30/2020  126 (H) 70 - 99 mg/dL Final     GLUCOSE BY METER POCT   Date Value Ref Range Status   09/23/2022 216 (H) 70 - 99 mg/dL Final   09/23/2022 157 (H) 70 - 99 mg/dL Final   09/23/2022 192 (H) 70 - 99 mg/dL Final   09/23/2022 71 70 - 99 mg/dL Final   09/22/2022 303 (H) 70 - 99 mg/dL Final       Hemoglobin A1c  Lab Results   Component Value Date    A1C 7.1 09/07/2023    A1C 6.6 04/03/2023    A1C 6.5 01/19/2023    A1C 7.7 09/15/2022    A1C 8.2 05/08/2022    A1C 6.8 05/10/2021    A1C 6.9 02/16/2021    A1C 6.7 08/20/2020    A1C 7.1 06/05/2020    A1C 6.9 02/06/2020       Liver enzymes  Lab Results   Component Value Date    AST 47 09/07/2023    AST 24 05/10/2021     Lab Results   Component Value Date    ALT 69 09/07/2023    ALT 35 05/10/2021     Lab Results   Component Value Date    BILICONJ 0.0 05/12/2014      Lab Results   Component Value Date    BILITOTAL 0.4 09/07/2023    BILITOTAL 0.3 05/10/2021     Lab Results   Component Value Date    ALBUMIN 4.0 09/07/2023    ALBUMIN 3.2 04/16/2022    ALBUMIN 3.4 05/10/2021     Lab Results   Component Value Date    PROTTOTAL 6.7 09/07/2023    PROTTOTAL 6.9 05/10/2021      Lab Results   Component Value Date    ALKPHOS 112 09/07/2023    ALKPHOS 132 05/10/2021       Creatinine:  Creatinine   Date Value Ref Range Status   09/07/2023 0.91 0.51 - 0.95 mg/dL Final   05/10/2021 0.81 0.52 - 1.04 mg/dL Final   ]    Complete Blood Count:  CBC RESULTS:   Recent Labs   Lab Test 09/07/23 0930   WBC 6.3   RBC 4.14   HGB 12.0   HCT 36.8   MCV 89   MCH 29.0   MCHC 32.6   RDW 16.5*          Cholesterol  Lab Results   Component Value Date    CHOL 174 09/07/2023    CHOL 192 09/17/2020     Lab Results   Component Value Date    HDL 88 09/07/2023    HDL 91 09/17/2020     Lab Results   Component Value Date    LDL 72 09/07/2023    LDL 84 09/17/2020     Lab Results   Component Value Date    TRIG 71 09/07/2023    TRIG 83 09/17/2020     Lab Results   Component Value Date    CHOLHDLRATIO 1.9 11/20/2014            TSH   Date Value Ref Range Status   03/24/2023 0.50 0.30 - 4.20 uIU/mL Final   08/17/2021 2.75 0.40 - 4.00 mU/L Final   03/15/2021 2.93 0.40 - 4.00 mU/L Final     T4 Free   Date Value Ref Range Status   06/05/2020 1.05 0.76 - 1.46 ng/dL Final   01/22/2019 0.90 0.76 - 1.46 ng/dL Final   01/04/2018 0.72 (L) 0.76 - 1.46 ng/dL Final   03/06/2017 0.95 0.76 - 1.46 ng/dL Final   01/30/2017 0.78 0.76 - 1.46 ng/dL Final     Free T4   Date Value Ref Range Status   03/24/2023 1.26 0.90 - 1.70 ng/dL Final   01/05/2023 0.71 (L) 0.90 - 1.70 ng/dL Final   08/17/2021 1.06 0.76 - 1.46 ng/dL Final           Assessment:  1.  Post pancreatectomy diabetes mellitus, s/p total pancreatectomy and islet autotransplant.    Lynn is a 58 year old with history of chronic pancreatitis who is s/p total pancreatectomy and islet autotransplant.  She was insulin independent until 6/6/2017 (just after 4 years post-tx) and now has partial islet function.    She is on Fiasp due to rapid gastric emptying and post meal hypoglycemia.      She remains on the Omnipod 5.  This has been hugely helpful in avoiding lows at night.  We did change I:C ratio for post prandial highs, as A1c and TIR are not yet at goal.  Does have partial islet function.     Has recurrent sepsis with UTI (E coli).  This is not the typical 'asplenia' sepsis but agree w PCP recommendation to see ID>        Plan:  1.  Changes to current diabetes regimen:  Patient Instructions   1)  Change insulin to carb ratio to 15 grams for post prandial highs.    2)  Referral to ID for recurrent sepsis post TPIAT (E coli, also in urine).     Follow up Jan 4 at 2:20    2.  Frequency of blood sugar checks:  premeal and bedtime, and as needed for hypoglycemia (6 strip per day).    3.  Continue routine follow up for autoislet transplant patients:  Mixed meal test (6 mL/kg BoostHP to max of 360 mL) at 3 months, 6 months, and once a year post transplant.  Hemoglobin A1c levels at these time  points and quarterly.    4.  Other issues addressed today:  As above    Follow up:  As above        Contact me for questions at [phone] or [phone].  Emergency number to reach pediatric endocrinology after hours is [phone].    Dr. Adriana Robles MD  [facility] [place]  Director of Research, Islet Auto-transplant Program  Phone:  [phone]  Electronically signed: September 8, 2023 at 10:00 AM          Review of the result(s) of each unique test - as indicated above  Prescription drug management  40 minutes spent on the date of the encounter doing chart review, history and exam, documentation and further activities per the note

## 2023-09-08 LAB
C PEPTIDE SERPL-MCNC: 1.8 NG/ML (ref 0.9–6.9)
C PEPTIDE SERPL-MCNC: 2.2 NG/ML (ref 0.9–6.9)

## 2023-09-08 NOTE — TELEPHONE ENCOUNTER
DIAGNOSIS: Sepsis due to Escherichia coli without acute organ dysfunction. Ref by Dr Robles. Sched per pt     DATE RECEIVED: 9.26.23   NOTES (Gather within 2 years) STATUS DETAILS   OFFICE NOTE from referring provider   Internal 9.7.23  Margaret VARGAST   OFFICE NOTE from other specialist Internal 8.15.23  [place]  Nephrology   DISCHARGE SUMMARY from hospital Internal 6.30-7.3.23  Milind Martin   DISCHARGE REPORT from the ER     LABS (any labs) Internal    MEDICATION LIST Internal    IMAGING  (NEED IMAGES AND REPORTS)     Osteomyelitis: Foot imaging      Liver Abscess: Abdominal imaging     Other (anything related to diagnoses Internal 6.30.23, 9.19.22  CT Abd/Pelvis

## 2023-09-09 LAB — ZINC SERPL-MCNC: 75.4 UG/DL

## 2023-09-10 LAB
A-TOCOPHEROL VIT E SERPL-MCNC: 7.1 MG/L
ANNOTATION COMMENT IMP: NORMAL
BETA+GAMMA TOCOPHEROL SERPL-MCNC: 1.1 MG/L
RETINYL PALMITATE SERPL-MCNC: <0.02 MG/L
VIT A SERPL-MCNC: 0.54 MG/L

## 2023-09-11 LAB
DEPRECATED CALCIDIOL+CALCIFEROL SERPL-MC: <53 UG/L (ref 20–75)
VITAMIN D2 SERPL-MCNC: <5 UG/L
VITAMIN D3 SERPL-MCNC: 48 UG/L

## 2023-09-12 LAB
A-LINOLENATE SERPL-SCNC: 41 NMOL/ML (ref 50–130)
AA SERPL-SCNC: 1205 NMOL/ML (ref 520–1490)
ARACHIDATE SERPL-SCNC: 29 NMOL/ML (ref 50–90)
CLINICAL BIOCHEMIST REVIEW: ABNORMAL
DHA SERPL-SCNC: 130 NMOL/ML (ref 30–250)
DOCOSAPENTAENATE W6 SERPL-SCNC: 36 NMOL/ML (ref 10–70)
DOCOSATETRAENOATE SERPL-SCNC: 52 NMOL/ML (ref 10–80)
DOCOSENOATE SERPL-SCNC: 8 NMOL/ML (ref 4–13)
DPA SERPL-SCNC: 96 NMOL/ML (ref 20–210)
EPA SERPL-SCNC: 115 NMOL/ML (ref 14–100)
FA SERPL-SCNC: 11 MMOL/L (ref 7.3–16.8)
G-LINOLENATE SERPL-SCNC: 60 NMOL/ML (ref 16–150)
HEXADECENOATE SERPL-SCNC: 102 NMOL/ML (ref 25–105)
HOMO-G LINOLENATE SERPL-SCNC: 135 NMOL/ML (ref 50–250)
LAURATE SERPL-SCNC: 19 NMOL/ML (ref 6–90)
LINOLEATE SERPL-SCNC: 2534 NMOL/ML (ref 2270–3850)
MEAD ACID SERPL-SCNC: 40 NMOL/ML (ref 7–30)
MONOUNSAT FA SERPL-SCNC: 3 MMOL/L (ref 1.3–5.8)
MYRISTATE SERPL-SCNC: 184 NMOL/ML (ref 30–450)
NERVONATE SERPL-SCNC: 139 NMOL/ML (ref 60–100)
OCTADECANOATE SERPL-SCNC: 726 NMOL/ML (ref 590–1170)
OLEATE SERPL-SCNC: 1866 NMOL/ML (ref 650–3500)
PALMITATE SERPL-SCNC: 2435 NMOL/ML (ref 1480–3730)
PALMITOLEATE SERPL-SCNC: 622 NMOL/ML (ref 110–1130)
POLYUNSAT FA SERPL-SCNC: 4.4 MMOL/L (ref 3.2–5.8)
SAT FA SERPL-SCNC: 3.5 MMOL/L (ref 2.5–5.5)
TRIENOATE/AA SERPL-SRTO: 0.03 {RATIO} (ref 0.01–0.04)
VACCENATE SERPL-SCNC: 219 NMOL/ML (ref 280–740)
W3 FA SERPL-SCNC: 0.4 MMOL/L (ref 0.2–0.5)
W6 FA SERPL-SCNC: 4 MMOL/L (ref 3–5.4)

## 2023-09-18 ENCOUNTER — HOSPITAL ENCOUNTER (EMERGENCY)
Facility: CLINIC | Age: 58
Discharge: HOME | End: 2023-09-18
Attending: PHYSICIAN ASSISTANT | Admitting: PHYSICIAN ASSISTANT
Payer: COMMERCIAL

## 2023-09-18 VITALS
BODY MASS INDEX: 22.48 KG/M2 | HEART RATE: 103 BPM | DIASTOLIC BLOOD PRESSURE: 91 MMHG | TEMPERATURE: 96.8 F | RESPIRATION RATE: 20 BRPM | OXYGEN SATURATION: 99 % | WEIGHT: 130.95 LBS | SYSTOLIC BLOOD PRESSURE: 164 MMHG
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
## 2023-09-18 PROCEDURE — 250N000013 HC RX MED GY IP 250 OP 250 PS 637: Performed by: PHYSICIAN ASSISTANT

## 2023-09-18 PROCEDURE — 99283 EMERGENCY DEPT VISIT LOW MDM: CPT

## 2023-09-18 RX ORDER — FLUCONAZOLE 150 MG/1
150 TABLET ORAL ONCE
Qty: 1 TABLET | Refills: 0 | Status: SHIPPED | OUTPATIENT
Start: 2023-09-18 | End: 2023-09-18

## 2023-09-18 RX ADMIN — AMOXICILLIN AND CLAVULANATE POTASSIUM 1 TABLET: 875; 125 TABLET, FILM COATED ORAL at 17:53

## 2023-09-18 ASSESSMENT — ACTIVITIES OF DAILY LIVING (ADL): ADLS_ACUITY_SCORE: 35

## 2023-09-18 NOTE — ED PROVIDER NOTES
History     Chief Complaint:  Dog Bite     The history is provided by the patient.      Lynn Thompson is a 58 year old female on insulin with a history of diabetes and hypertension  who presents to the emergency department for dog bite. The patient states that at 1500, she tried to break up a fight between their handicap dog and another dog they own, when the handicap dog turned and bit her. The patient reports she sustained a dog bite to the right middle finger as well as a puncture wound to her right thigh. She adds that she is able to bend her fingers and denies numbness in her fingers knee or leg. She notes that the dogs are up to date on there vaccines. Denies any concern for a dog tooth breaking off into the bite. Her last Tdap was  and was for another dog bite,    Independent Historian:   None - Patient Only    Review of External Notes:   none     Medications:  Duloxetine  Escitalopram  Famotidine  Gabapentin  Hydroxyzine  Insulin aspart  Insulin glargine    Levothyroxine  Loratadine  Omeprazole  Tramadol  Trazodone    Past Medical History:   Vertigo  Calculus of kidney  Pancreatis  Depression  Diabetes  Dyspepsia  Hypertension  Iron deficiency anemia  BLU  Thyroid nodule  Dysthymia  Rectal prolapse  SBO  Pyuria  Vaginal atrophy  Hypocitraturia  Pyelonephritis  Sepsis    Past Surgical History:    Abdominoplasty  Appendectomy  Bunionectomy  CBD stent    Cholecystectomy  Ovarian cystectomy  ESWL  Gastric bypass  Hernia repair  Hysterectomy  I&D, hand  Ilium resection  Rectopexy abdomen  Sacrocolpopexy  Tonsillectomy  Adenoidectomy  Pancreatic islet transplant    Physical Exam   Patient Vitals for the past 24 hrs:   BP Temp Temp src Pulse Resp SpO2 Weight   23 1625 (!) 164/91 96.8  F (36  C) Temporal 103 20 99 % 59.4 kg (130 lb 15.3 oz)      Physical Exam  General: Alert and oriented.    Head: Normocephalic.  External ears and nose normal.    Eyes: Pupils equal and round.  Normal  tracking.    Pulmonary/Chest: Effort and rate normal    MSK:  Normal ROM in MCP, IP joints.  Normal ROM in knee, ankle, toes.    SKIN:  single <0.5 cm Puncture wound to Right thigh several inches proximal to knee joint.  Superficial abrasions and superficial puncture to R palm and dorsal hand.  No active bleeding or fb seen.  No redness or drainage.  Warm and dry with strong radial pulse and cap refill <2 seconds. Warm and dry with strong DP,PT pulse and normal capillary refill. No rashes or crepitance.    NEURO:  Normal strength of flexion and extension at MCP, PIP, and DIP joints.  Normal sensation to touch BL in fingers. Normal sensation throughout the foot and ankle. Normal strength of plantar and dorsiflexion in ankle and toes.    PSYCH:  Normal affect    Emergency Department Course     Emergency Department Course & Assessments:    Interventions:  Medications   amoxicillin-clavulanate (AUGMENTIN) 875-125 MG per tablet 1 tablet (has no administration in time range)      Assessments:  1725 I obtained history and examined the patient as noted above. I discussed findings and discharge with the patient. All questions answered.     Independent Interpretation (X-rays, CTs, rhythm strip):  None    Consultations/Discussion of Management or Tests:  None     Social Determinants of Health affecting care:   None    Disposition:  The patient was discharged to home.     Impression & Plan      Medical Decision Makin-year-old female who presents for evaluation of dog bites to the hand and leg.  On eval has dog bites but otherwise well-appearing.  There is no evidence of fracture, foreign body, tendon, or neurovascular injury.  The wound to the leg is well proximal to the knee joint and no concern for traumatic arthrotomy.  No evidence of infection at this time but wounds were cleaned and dressed will prophylax with Augmentin.  Patient's tetanus is up-to-date.  Dogs are immunized and known no indication to begin rabies  series.  Discussed to monitor for signs of infection and return if these develop.  Augmentin for home.    Diagnosis:    ICD-10-CM    1. Dog bite, initial encounter  W54.0XXA          Discharge Medications:  New Prescriptions    AMOXICILLIN-CLAVULANATE (AUGMENTIN) 875-125 MG TABLET    Take 1 tablet by mouth 2 times daily for 4 days      Scribe Disclosure:  I, Alexis Fontenot, am serving as a scribe at 5:27 PM on 9/18/2023 to document services personally performed by Raymundo Ward PA-C based on my observations and the provider's statements to me.     9/18/2023   Raymundo Ward PA-C Etten, Clark Ellsworth, PA-C  09/18/23 [id number]

## 2023-09-18 NOTE — ED TRIAGE NOTES
Pt presents to the ED with complaint of dog bite to right middle finger and puncture wound to right thigh that's covered. Pt states the dogs are hers and they are up to date on vaccines.      Triage Assessment       Row Name 09/18/23 [id number]       Triage Assessment (Adult)    Airway WDL WDL       Respiratory WDL    Respiratory WDL WDL       Skin Circulation/Temperature WDL    Skin Circulation/Temperature WDL X  laceration to right middle finger, puncture wound to right thigh       Cardiac WDL    Cardiac WDL X;rhythm    Pulse Rate & Regularity tachycardic       Peripheral/Neurovascular WDL    Peripheral Neurovascular WDL WDL       Cognitive/Neuro/Behavioral WDL    Cognitive/Neuro/Behavioral WDL X  crying

## 2023-09-26 ENCOUNTER — PRE VISIT (OUTPATIENT)
Dept: INFECTIOUS DISEASES | Facility: CLINIC | Age: 58
End: 2023-09-26
Payer: COMMERCIAL

## 2023-09-27 ENCOUNTER — VIRTUAL VISIT (OUTPATIENT)
Dept: INFECTIOUS DISEASES | Facility: CLINIC | Age: 58
End: 2023-09-27
Attending: PEDIATRICS
Payer: COMMERCIAL

## 2023-09-27 DIAGNOSIS — Z87.440 HISTORY OF UTI: Primary | ICD-10-CM
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
## 2023-09-27 DIAGNOSIS — A41.51 SEPSIS DUE TO ESCHERICHIA COLI WITHOUT ACUTE ORGAN DYSFUNCTION (H): ICD-10-CM

## 2023-09-27 PROCEDURE — 99204 OFFICE O/P NEW MOD 45 MIN: CPT | Mod: VID | Performed by: STUDENT IN AN ORGANIZED HEALTH CARE EDUCATION/TRAINING PROGRAM

## 2023-09-27 NOTE — LETTER
"9/27/2023       RE: Lynn Thompson  [address] [facility] [id number]     Dear Colleague,    Thank you for referring your patient, Lynn Thompson, to the [facility] INFECTIOUS DISEASE CLINIC [place] [facility]. Please see a copy of my visit note below.    Virtual Visit Details    Type of service:  Video Visit   Video Start Time: 2:55 PM  Video End Time:3:16 PM    Originating Location (pt. Location): Home    Distant Location (provider location):  Off site  Platform used for Video Visit: [facility]  Transplant Infectious Disease Clinic Note:  Virtual New Patient     Patient:  Lynn Thompson, Date of birth 1965, Medical record number [id number]  Date of Visit:  09/27/2023  Consult requested by Dr. Robles  for evaluation of abx prophy for UTI.          Assessment and Recommendations:   Recommendations:  -Monitor dog bite sites for infection, currently resolved per pt.  -Will not recommend secondary prophy abx  for UTIs at this time. She reports 3 episodes of \"sepsis\" over the last 4 years. This is not often and is and any risk of  infection likely due to her kidney stones which are a nidus for urinary organisms. Therefore, to decrease her further occurrence of infection, addressing the kidney stones (which she is doing with her nephorlogist) and control of her DM will be more beneficial than chronic abx suppression which can lead to side effects and risk of other infections such as c. Diff. She already had an episode of C. Diff in the past. After a lengthy discussion on the risk and benefits of suppressive abx for UTIs, we decided to hold off on abx and control her DM and kidney stones as the risk of C.diff and side effects of abx are higher than the remote benefit she may get from them. She agreed with the plan.    -Up to date on teatnus. Will recommend Shingles, COVID, RSV and Flu vaccine when able. "   -recommend tighter glycemic control to limit the risk of infections.    RTC as needed.     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 of note, she had a another dog bite on 9/18/23.     E coli bacteremia and pyelonephritis infection on 6/30/23   Sepsis 2/2 E Coli Pyelonephritis w/ E Coli Bacteremia  - she went to the ER for fevers. Both urine culture and blood cultures have returned positive for pan-sensitive E. Coli and was zoysn/ceftriaxone and discharged on Augmentin for total of 14d abx.     Hx of Recurrent kidney stones:  - stone type primarily calcium oxalate though 2 analysis showed small component of calcium phosphate  9/20/2022 Left URS, laser lithotripsy, stone basketing  4/20/2015 right URS and lithotripsy with stone basketing  3/23/15 right URS, lithotripsy  10/16/2012 left ESWL  3/2011 right stent for ureteral stone and obstruction  1/2011 right URS and stone extraction  3/5/2008 right and left ESWL    Dog Bite on 9/18/23  -tried to break up a fight between their handicap dog and another dog they own, when the handicap dog turned and bit her. The patient reports she sustained a dog bite to the right middle finger as well as a puncture wound to her right thigh. She went to the ER and <0.5 cm Puncture wound to Right thigh several inches proximal to knee joint. Per the ER note: No evidence of infection at this time but wounds were cleaned and dressed will prophylax with Augmentin.  Patient's tetanus is up-to-date (Her last Tdap was 2020 and was for another dog bite).  Dogs are immunized  and known no indication to begin rabies series She was given augmentin for 5d. Today during the virtual video visit and states that her hand and leg and almost healed and has no concerns.        Piror infections:    Hx of dog bite-related left hand infection in 2020, Rx with zoysn/augmentin for 10d    Hx of  Prior history of gram-negative blas bacteremia 2017 years ago, with concern there might be some issues with her biliary duct, but has not had recurrent episodes.       C.diff, first occurrence last year.     Amara Finley MD. 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 of note, she had a another dog bite on 9/18/23.     She is seen over video. She has no complaints and states that her leg and hands are healing well from the dog bite. She has no urinary issues, no fevers, no chills, sob, no cough, n/v/d or abd pain. She states that she had 3 episodes of sepsis from UTIs over the last 4 years. She states that she is asymptomatic except for fevers.     Born in [place], she worked as customer service for health insurance, has dogs and cats. She like to walk and kaytak. No travel outside [place]. No hx of TB. She lives with her . No one is sick at home.   Transplants:  5/10/2013 (Islet); Postoperative day:  3792.  Coordinator Erlinda Multani    Review of Systems:  CONSTITUTIONAL:  No fevers or chills. No night  sweats.  EYES: negative for icterus or acute vision changes.   ENT:  negative for hearing loss, tinnitus or sore throat  RESPIRATORY:  negative for cough, sputum, dyspnea  CARDIOVASCULAR:  negative for chest pain, heart palpitations  GASTROINTESTINAL:  negative for nausea, vomiting, diarrhea or constipation  GENITOURINARY:  negative for dysuria or hematuria.  HEME:  No easy bruising or bleeding  INTEGUMENT:  negative for rash or pruritus  NEURO:  Negative for headache or tremor.    Past Medical History:   Diagnosis Date    Benign paroxysmal positional vertigo     occ.     Calculus of kidney 05/2005    x1 on L side passed, several stones.  Has been tested for oxalate.    Chronic abdominal pain 2013    Chronic pancreatitis 2013    Depression     also occ panic spells    Diabetes (H) 5/10/2013    Total Pancreatomy with Auto Islets Transplant    Dyspepsia 1999    H. pylori   treated    Headaches     still periodic HA's ;  often 5X/week    Hypertension 2016    Stress related    Iron deficiency anemia 2003    relates to gastric bypass    Post-pancreatectomy diabetes melltius 2013    Sleep apnea     uses splint    Spasm of sphincter of Oddi     surgical + endoscopic stenting of pancreatic duct @ [facility] 06    Thyroid nodule 2016       Past Surgical History:   Procedure Laterality Date    ABDOMINOPLASTY  2002    Tummy tuck    APPENDECTOMY  1990    BUNIONECTOMY Right 1998    CBD Stent placement  2002    CBD stent; Dr. Presley     SECTION      CHOLECYSTECTOMY      COLONOSCOPY N/A 2021    Procedure: COLONOSCOPY INCOMPLETE Aborted due to incomplete prep  will need to take additional prep and return tomorrow 21;  Surgeon: Ihsan Saenz MD;  Location:  GI    COMBINED CYSTOSCOPY, RETROGRADES, URETEROSCOPY, LASER HOLMIUM LITHOTRIPSY URETER(S), INSERT STENT Right 2015    Procedure: COMBINED CYSTOSCOPY, RETROGRADES, URETEROSCOPY, LASER HOLMIUM  LITHOTRIPSY URETER(S), INSERT STENT;  Surgeon: Kennedi Aldana MD;  Location: [facility] OR    COMBINED CYSTOSCOPY, RETROGRADES, URETEROSCOPY, LASER HOLMIUM LITHOTRIPSY URETER(S), INSERT STENT Right 04/20/2015    Procedure: COMBINED CYSTOSCOPY, RETROGRADES, URETEROSCOPY, LASER HOLMIUM LITHOTRIPSY URETER(S), INSERT STENT;  Surgeon: Kennedi Aldana MD;  Location: [facility] OR    COSMETIC SURGERY  6/24/2002    Tummy tuck    CYSTECTOMY OVARIAN BENIGN Right     CYSTOSCOPY, RETROGRADES, INSERT STENT URETER(S), COMBINED  10/02/2012    Procedure: COMBINED CYSTOSCOPY, RETROGRADES, INSERT STENT URETER(S);  COMBINED CYSTOSCOPY,  , INSERT LEFT STENT URETER;  Surgeon: Johny Baez MD;  Location: [facility] OR    CYSTOSCOPY, RETROGRADES, INSERT STENT URETER(S), COMBINED Left 9/20/2022    Procedure: Cystoscopy with left retrograde pyelogram, left ureteroscopy, thulium laser lithotripsy of left ureteral calculus, stone basketing and left ureteral stent insertion;  Surgeon: Alonzo Rome MD;  Location: [facility] OR    ESOPHAGOSCOPY, GASTROSCOPY, DUODENOSCOPY (EGD), COMBINED N/A 01/24/2018    Procedure: COMBINED ESOPHAGOSCOPY, GASTROSCOPY, DUODENOSCOPY (EGD);  ESOPHAGOSCOPY, GASTROSCOPY, DUODENOSCOPY (EGD)    ;  Surgeon: Tamir Rodgers MD;  Location:  GI    EXTRACORPOREAL SHOCK WAVE LITHOTRIPSY (ESWL)  10/16/2012    Procedure: EXTRACORPOREAL SHOCK WAVE LITHOTRIPSY (ESWL);  left EXTRACORPOREAL SHOCK WAVE LITHOTRIPSY (ESWL) ;  Surgeon: Johny Baez MD;  Location: [facility] OR    Gastric bypass NOS      HERNIA REPAIR  02/2015    HYSTERECTOMY SUPRACERVICAL, BILATERAL SALPINGO-OOPHORECTOMY, COMBINED N/A 02/01/2022    Procedure: Abdominal supracervical hysterectomy, bilateral salpingooophrectomy;  Surgeon: Nicole Rivera MD;  [facility]    IRRIGATION AND DEBRIDEMENT HAND, COMBINED Left 10/30/2020    Procedure: Left hand sharp excisional debridement of skin, subcutaneous tissue and fat with a scalpel, 2 x 1 x 1 cm.;  Surgeon:  Demian Renteria MD;  Location: [facility] OR    LAPAROSCOPIC LYSIS ADHESIONS N/A 2015    Procedure: LAPAROSCOPIC LYSIS ADHESIONS;  Surgeon: Aaron Early MD;  [facility]    LAPAROSCOPIC LYSIS ADHESIONS N/A 2015    Procedure: LAPAROSCOPIC LYSIS ADHESIONS;  Surgeon: Aaron Early MD;  [facility]    PANCREATECTOMY, TRANSPLANT AUTO ISLET CELL, COMBINED  05/10/2013    Procedure: COMBINED PANCREATECTOMY, TRANSPLANT AUTO ISLET CELL;  Pancreatectomy, Auto Islet Cell Transplant   hernia repair, jejunostomy tube and liver biopsies with Anesthesia General with block;  Surgeon: Aaron Early MD;  [facility]    Partial ileum resection      RECTOPEXY ABDOMINAL N/A 2022    Procedure: RECTOPEXY, ABDOMINAL;  Surgeon: Uriah Sheridan MD;  [facility]    REPAIR PTOSIS BROW BILATERAL Bilateral 2020    Procedure: BILATERAL BROW PTOSIS REPAIR;  Surgeon: Denise Alberts MD;  Location: [facility] OR    SACROCOLPOPEXY, CYSTOSCOPY, COMBINED N/A 2022    Procedure: Uterosacral ligament suspension, pina colposuspension with Cystoscopy;  Surgeon: Nicole Rivera MD;  [facility]    SIGMOIDOSCOPY FLEXIBLE N/A 2022    Procedure: SIGMOIDOSCOPY, FLEXIBLE;  Surgeon: Uriah Sheridan MD;  [facility]    Surgery for SBO      TONSILLECTOMY, ADENOIDECTOMY, COMBINED  1997    TRANSPLANT  5/10/13    Pancreatic Auto-Islet Transplant       Family History   Problem Relation Age of Onset    Family History Negative Mother     Respiratory Father         COPD;  at 69    Genitourinary Problems Father         kidney stones    Substance Abuse Father     Depression Father     Asthma Father     Heart Disease Paternal Grandfather         M.I.    Coronary Artery Disease Paternal Grandfather     Hyperlipidemia Paternal Grandfather     Genitourinary Problems Brother         multiple brothers with kidney stones    Gastrointestinal Disease Maternal Grandmother          undiagnosed 'gut' issues    Coronary Artery Disease Maternal Grandfather     Hyperlipidemia Maternal Grandfather     Cerebrovascular Disease Paternal Grandmother         At the age of 103    Anxiety Disorder Paternal Grandmother     Osteoporosis Paternal Grandmother     Anxiety Disorder Son     Anxiety Disorder Daughter     Asthma Daughter     Colon Cancer No family hx of        Social History     Social History Narrative    Not on file     Social History     Tobacco Use    Smoking status: Never    Smokeless tobacco: Never   Vaping Use    Vaping Use: Never used   Substance Use Topics    Alcohol use: Not Currently    Drug use: Not Currently       Immunization History   Administered Date(s) Administered    COVID-19 Bivalent 12+ (Pfizer) 10/18/2022    COVID-19 MONOVALENT 12+ (Pfizer) 03/12/2021, 04/02/2021, 11/01/2021    COVID-19 Monovalent 12+ (Pfizer 2022) 07/11/2022    FLU 6-35 months 11/03/2009    HIB (PRP-T) 04/23/2013    Influenza (High Dose) 3 valent vaccine 11/02/2015, 10/10/2016, 10/31/2018, 10/31/2019    Influenza (IIV3) PF 11/07/1996, 11/23/2001, 10/25/2004, 12/01/2005, 11/02/2010, 10/11/2011, 10/09/2012, 11/01/2014    Influenza Vaccine 18-64 (Flublok) 11/04/2022    Influenza Vaccine 65+ (Fluzone HD) 12/02/2021    Influenza Vaccine >6 months (Alfuria,Fluzone) 10/16/2017    Meningococcal ACWY (Menactra ) 04/23/2013    Pneumococcal 23 valent 12/10/2003, 05/07/2013, 07/09/2019    TD,PF 7+ (Tenivac) 01/01/1996    TDAP Vaccine (Adacel) 10/26/2010, 08/17/2020       Patient Active Problem List   Diagnosis    Iron deficiency anemia secondary to inadequate dietary iron intake    Gastric bypass status for obesity    Health Care Home    Chronic pain syndrome    Exocrine pancreatic insufficiency    Asplenia    BLU (obstructive sleep apnea)    H/O of rectopexy    Dysthymia    Anxiety    Thyroid nodule    Acquired hypothyroidism    Post-pancreatectomy diabetes (H)    Other iron deficiency anemia    Uterovaginal prolapse,  unspecified    Rectal prolapse    Small bowel obstruction (H)    Urge incontinence    Urinary urgency    High-tone pelvic floor dysfunction    Pyuria    Recurrent kidney stones    History of uterine prolapse    Vaginal atrophy    Enteric hyperoxaluria    Hypocitraturia    Acute pyelonephritis    Acute sepsis (H)       No outpatient medications have been marked as taking for the 9/27/23 encounter (Appointment) with Amara Finley MD.       Allergies   Allergen Reactions    Corticosteroids Other (See Comments)     All oral, IV and injectable steroids are contraindicated (unless in life threatening situations) in Islet Auto transplant recipients. They can cause irreversible loss of islet cell function. Please contact patient's transplant care coordinator, Erlinda Multani RN BSN at [phone]/pager: [phone] and endocrinologist prior to administration.      Povidone Iodine Hives     Causes skin to blister    Nsaids      naprosyn = GI upset    Sulfasalazine Nausea and Nausea and Vomiting              Physical Exam:   Vitals were reviewed.  All vitals stable  LMP 12/19/2013   Wt Readings from Last 4 Encounters:   09/18/23 59.4 kg (130 lb 15.3 oz)   09/07/23 59.4 kg (130 lb 15.3 oz)   09/07/23 59.4 kg (130 lb 15.3 oz)   07/01/23 56.7 kg (125 lb)       Exam:  GENERAL: well-developed, well-nourished, alert, oriented, in no acute distress over video  HEAD: Head is normocephalic, atraumatic   EYES: Eyes have anicteric sclerae.    NEUROLOGIC: Grossly nonfocal.         Laboratory Data:     No results found for: ACD4    Inflammatory Markers    Recent Labs   Lab Test 10/30/20  0633   SED 13   CRP 21.8*       Immune Globulin Studies   No lab results found.    Metabolic Studies    Recent Labs   Lab Test 09/07/23  1232 09/07/23  1130 09/07/23  0930 07/03/23  0725 07/01/23  0618 06/30/23  2019 04/03/23  1445 01/05/23  1342 05/19/16  1003 05/12/16  1003   NA  --   --  143 141 139 135*   < > 141   < > 137   POTASSIUM  --    --  4.3 4.1 4.2 4.2   < > 4.2   < > 4.3   CHLORIDE  --   --  105 106 108* 100   < > 106   < > 100   CO2  --   --  28 28 23 24   < > 21*   < > 33*   ANIONGAP  --   --  10 7 8 11   < > 14   < > 4   BUN  --   --  24.1* 13.4 16.3 22.5   < > 21.9   < > 13   CR  --   --  0.91 0.88 0.90 0.97*   < > 0.94   < > 0.81   GFRESTIMATED  --   --  72 75 73 67   < > 70   < > 74   *   < > 183*  183* 130* 101* 338*   < > 111*   < > 161*   VALARIE  --   --  9.6 9.2 8.5* 9.1   < > 9.0   < > 9.1   PHOS  --   --   --   --   --   --   --  4.2   < >  --    MAG  --   --   --   --   --   --   --  1.8   < >  --    LACT  --   --   --   --   --  1.4  --   --    < >  --    CKT  --   --   --   --   --   --   --   --   --  54    < > = values in this interval not displayed.       Hepatic Studies    Recent Labs   Lab Test 09/07/23 0930 06/30/23 2019 04/03/23  1445 10/31/22  0741 09/19/22  1538   BILITOTAL 0.4 0.3 0.2   < > <0.2   DBIL  --   --   --   --  <0.20   ALKPHOS 112* 132* 122*   < > 151*   PROTTOTAL 6.7 6.9 6.7   < > 7.1   ALBUMIN 4.0 3.8 4.3   < > 4.2   AST 47* 23 30   < > 44*   ALT 69* 21 30   < > 56*    < > = values in this interval not displayed.       Pancreatitis testing    Recent Labs   Lab Test 09/07/23 0930 01/05/23  1342 09/15/22  0948 11/23/21  2136 08/17/21  1131 03/01/21  0208 02/28/21  0031   LIPASE  --   --   --  10*  --  17* 19*   TRIG 71 89   < >  --    < >  --   --     < > = values in this interval not displayed.       Lipid testing    Recent Labs   Lab Test 09/07/23  0930 01/05/23  1342 09/15/22  0948   CHOL 174 230* 246*   HDL 88 97 97   LDL 72 115* 122*   TRIG 71 89 134       Gout Labs    No lab results found.    Hematology Studies   Recent Labs   Lab Test 09/07/23  0930 08/11/23  1037 07/03/23  0725 07/01/23  0618 09/03/21  1244 05/10/21  1120 03/15/21  1411 03/01/21  0208   WBC 6.3 7.0 8.6 13.2*   < > 6.0   < > 12.6*   ANEU  --   --   --   --   --  3.8  --  10.9*   ANEUTAUTO 3.8 4.2  --   --    < >  --   --   --     ALYM  --   --   --   --   --  1.4  --  0.8   ALYMPAUTO 1.5 1.9  --   --    < >  --   --   --    MADISON  --   --   --   --   --  0.7  --  0.8   AMONOAUTO 0.7 0.7  --   --    < >  --   --   --    AEOS  --   --   --   --   --  0.1  --  0.1   AEOSAUTO 0.2 0.1  --   --    < >  --   --   --    ABSBASO 0.1 0.1  --   --    < >  --   --   --    HGB 12.0 11.4* 10.8* 10.2*   < > 10.7*   < > 11.5*   HCT 36.8 36.6 33.5* 32.3*   < > 34.5*   < > 38.8    528* 478* 347   < > 404   < > 335    < > = values in this interval not displayed.       Clotting Studies    Recent Labs   Lab Test 05/10/16  2019   INR 1.03       Iron Testing    Recent Labs   Lab Test 09/07/23  0930 08/11/23  1037 04/03/23  1445 03/24/23  1325 09/19/22  1538 09/15/22  0948 09/03/21  1243 05/10/21  1120   IRON 110 38  --  118   < > 56   < > 65   * 307  --  310   < > 283   < > 378   IRONSAT 49* 12*  --  38   < > 20   < > 17   GRISELDA 737* 51  --  44   < > 172   < > 7*   MCV 89 90   < > 92   < > 96   < > 91   FOLIC  --   --   --   --   --   --   --  18.6   B12 330  --   --   --   --  418   < > 274    < > = values in this interval not displayed.       Markers  No lab results found.    Invalid input(s): FETOPROTEIN, SERUM, AFP    Autoimmune Testing   No lab results found.    Invalid input(s): ANCAB, PANCA, CANCA    Arterial Blood Gas Testing    Recent Labs   Lab Test 02/01/22  1153   O2PER 40.0        Thyroid Studies     Recent Labs   Lab Test 03/24/23  1325 01/05/23  1342 08/17/21  1131 08/17/21  1131 03/15/21  1411 09/17/20  0727   TSH 0.50 6.56*  --  2.75 2.93 3.04   T4 1.26 0.71*   < > 1.06  --   --    T3  --   --   --  64  --   --     < > = values in this interval not displayed.       Urine Studies     Recent Labs   Lab Test 06/30/23  1944 12/13/22  0759 09/19/22  1639 06/23/22  1226 06/14/22  2132 05/08/22  1642 04/16/22  2353 02/03/22  1221 02/28/21  0218 06/11/20  0833   URINEPH 5.5 6.0 5.5 6.0 6.0   < > 5.5 6.0   < > 5.5   NITRITE Negative Negative  Negative Negative Negative   < > Negative Negative   < > Positive*   LEUKEST Large* Small* Moderate* Negative Large*   < > Negative Moderate*   < > Large*   WBCU >182*  --  39*  --  116*  --  1 9*   < > >182*   UWBCLM  --   --   --   --   --   --   --   --   --  Present*    < > = values in this interval not displayed.       Medication levels  No lab results found.    Invalid input(s): AMIK    CSF testing     Recent Labs   Lab Test 02/28/21  0517   CWBC 1  Test not performed. Criteria not met for second cell count.   CRBC 0  Test not performed. Criteria not met for second cell count.   CGLU 69   CTP 27       Microbiology:  Fungal testing  No lab results found.    Invalid input(s): HIFUN, FUNGL    Beta D Glucan levels (Fungitell assay)    No results found for: FGTL, FGTLI     Last Culture results   Rapid Strep A Screen   Date Value Ref Range Status   12/30/2014   Final    NEGATIVE: No Group A streptococcal antigen detected by immunoassay, await   culture report.       Culture   Date Value Ref Range Status   07/01/2023 No Growth  Final   07/01/2023 No Growth  Final   06/30/2023 No Growth  Final   06/30/2023 Positive on the 1st day of incubation (A)  Final   06/30/2023 Escherichia coli (AA)  Final     Comment:     1 of 2 bottles   06/30/2023 >100,000 CFU/mL Escherichia coli (A)  Final   09/19/2022 No Growth  Final   09/19/2022 No Growth  Final   09/19/2022 50,000-100,000 CFU/mL Proteus mirabilis (A)  Final   06/14/2022 10,000-50,000 CFU/mL Proteus mirabilis (A)  Final   06/14/2022 10,000-50,000 CFU/mL Proteus mirabilis (A)  Final   04/16/2022 No Growth  Final   04/16/2022 No Growth  Final   02/03/2022 No Growth  Final     Culture Micro   Date Value Ref Range Status   03/01/2021 No growth  Final   03/01/2021 No growth  Final   02/28/2021 No growth  Final   02/28/2021 No growth  Final   02/28/2021 No growth  Final   10/30/2020 No anaerobes isolated  Final   10/30/2020 No growth  Final   10/28/2020 No growth  Final    10/28/2020 No growth  Final   06/11/2020 >100,000 colonies/mL  Escherichia coli   (A)  Final     Escherichia coli   Date Value Ref Range Status   06/30/2023 Detected (A) Not Detected Final     Comment:     Positive for Escherichia coli by Verigene multiplex nucleic acid test. Final identification and antimicrobial susceptibility testing will be verified by standard methods. Verigene test will not distinguish E. coli from Shigella species including Shigella dysenteriae, Shigella flexneri, Shigella boydii, and Shigella sonnei. Specimens containing Shigella species or E. coli will be reported as positive for E. coli.         Last checks of Clostridioides difficile testing  Recent Labs   Lab Test 04/17/22  0200 11/18/17  1900 08/27/17  1248 04/03/16  1700   CDBPCT Positive* Negative Negative Negative  Negative: Clostridium difficile target DNA sequences NOT detected, presumed   negative for Clostridium difficile toxin B or the number of bacteria present   may be below the limit of detection for the test.   FDA approved assay performed using [employer] GeneXpert real-time PCR.   A negative result does not exclude actual disease due to Clostridium difficile   and may be due to improper collection, handling and storage of the specimen or   the number of organisms in the specimen is below the detection limit of the   assay.         No components found for: AFBSTN    Syphilis Testing  Invalid input(s): [id number]    Tick Testing  No lab results found.    Invalid input(s): APHAGM    ASO Testing  Invalid input(s): [id number]    Quantiferon testing   Recent Labs   Lab Test 09/07/23  0930 08/11/23  1037   LYMPH 24 27       Infection Studies to assess Diarrhea  Recent Labs   Lab Test 03/02/21  0850 11/18/17  1900 04/03/16  1700   EPSTX1 Not Detected Not Detected Not Detected   EPSTX2 Not Detected Not Detected Not Detected   EPCAMP Detected, Abnormal Result* Not Detected Not Detected   EPSALM Not Detected Not Detected Not Detected    EPSHGL Not Detected Not Detected Not Detected   EPVIB Not Detected Not Detected Not Detected   EPROTA Not Detected Not Detected Not Detected   EPNORO Not Detected Not Detected Not Detected   EPYER Not Detected Not Detected Not Detected       Virology:  Coronavirus-19 testing    Recent Labs   Lab Test 06/30/23  2238 09/19/22 2029 04/17/22  0054 01/28/22  1047 08/10/21  1934 03/28/21  1153   [id number] Negative Negative Negative Negative   < > Test received-See reflex to IDDL test SARS CoV2 (COVID-19) Virus RT-PCR  NEGATIVE   [id number]  --   --   --   --   --  Nasopharyngeal   [id number]  --   --   --   --   --  Nasopharyngeal    < > = values in this interval not displayed.       Respiratory virus (non-coronavirus-19) testing    No lab results found.    CMV viral loads  No results found for: CMVQNT, CMVRESINST, CMVLOG, 98967, 81483, 21775, 36702    CMV resistance testing  No lab results found.  No results found for: CMVCID, CMVFOS, CMVGAN    No results found for: H6RES    No results found for: EBVDN, EBRES, EBVDN, EBVSP, EBVPC, EBVPCR    BK viral loads No lab results found.    Parvovirus Testing  No lab results found.    Invalid input(s): PRVRES    Adenovirus Testing  No lab results found.    Invalid input(s): ADENAB, ADENOVIRUS, ADQT    Hepatitis B Testing   No lab results found.  Was the last Hepatitis B E antigen positive?   No results found for: HBEAGN     Hepatitis C Antibody   Date Value Ref Range Status   03/06/2017  NR Final    Nonreactive   Assay performance characteristics have not been established for newborns,   infants, and children         No results found for: CMVIGG, CMVM, CMVIM, CMIG, CMVG, CMIGG, CMIM, CMVIGM, CMLTX, HSVG1, HSVG2, HSVTP1, EX5238, HS12M, HS12GR, HS1GR, HS2GR, HSIM, HSIG, HSIGR, HSVIGMAB, HSVG1, VZVIGG, VARICZOSAB  No results found for: EBVCAG, EBIG2, EBIGM, EBVIGG, EBIGG, EBVAGN, OI2655, TOXG  No results found for: H1IGG, H2IGG, EBVCAM    No components found for:  PTZ9811    Last Pathology Report   Case Report   Date Value Ref Range Status   02/01/2022   Final    Surgical Pathology Report                         Case: [id number]                                  Authorizing Provider:  Nicole Rivera MD  Collected:           02/01/2022 10:36 AM          Ordering Location:      MAIN OR                 Received:            02/01/2022 02:26 PM          Pathologist:           Gilma Whyte MD                                                             Specimen:    Uterus, Bilateral Fallopian Tubes & Ovaries, Uterus, bilateral fallopian tubes, and                 ovaries.                                                                                    Clinical Information   Date Value Ref Range Status   02/01/2022   Final    57-year-old female history of rectal prolapse       Final Diagnosis   Date Value Ref Range Status   02/01/2022   Final     Uterus, bilateral fallopian tubes, and ovaries, supracervical hysterectomy with bilateral salpingo-oophorectomy:  -Inactive endometrium  -Adenomyosis  -Bilateral fallopian tubes, no significant histologic abnormalities  -Bilateral ovaries with atrophic changes  -Negative for malignancy           Imaging:  Results for orders placed or performed during the hospital encounter of 06/30/23   CT Abdomen Pelvis w Contrast    Narrative    EXAM: CT ABDOMEN PELVIS W CONTRAST  LOCATION: [facility]  DATE: 6/30/2023    INDICATION: Left flank pain. Urinary tract infection. Fever. Sepsis.  COMPARISON: CT abdomen pelvis 11/23/2021.  TECHNIQUE: CT scan of the abdomen and pelvis was performed following injection of IV contrast. Multiplanar reformats were obtained. Dose reduction techniques were used.  CONTRAST: 63mL Isovue 370    FINDINGS:   LOWER CHEST: Normal.    HEPATOBILIARY: Status post cholecystectomy and  hepaticojejunostomy with unchanged expected pneumobilia. No liver lesions.     PANCREAS: Surgically absent.    SPLEEN: Surgically absent.    ADRENAL GLANDS: Normal.    KIDNEYS/BLADDER: Mild cortical scarring at the left upper renal pole. Few faint patchy hypoenhancing areas in the left upper renal pole and right mid to upper pole, suspicious for pyelonephritis. No renal abscess. Diffuse urothelial thickening and   enhancement throughout both renal pelves and ureters. Cluster of nonobstructing calculi at the left lower pole, the largest of which measures 4 mm. Nonobstructing 2 mm right lower pole renal calculus. No ureteral calculi or hydronephrosis. Mild diffuse   urinary bladder wall thickening with mild mucosal enhancement.    BOWEL: Status post gastric bypass and ileocecectomy. No evidence of bowel obstruction or inflammation. Moderate amount of stool within the ascending colon. Small amount of stool within the descending and proximal sigmoid colon.    LYMPH NODES: No enlarged lymph nodes. Multiple prominent but nonenlarged lymph nodes are present within the central mesentery, similar to prior.    VASCULATURE: Patent portal and superior mesenteric veins. Nonaneurysmal abdominal aorta.    PELVIC ORGANS: Uterus is absent.    MUSCULOSKELETAL: No aggressive bone lesions or acute bony abnormality.      Impression    IMPRESSION:     1.  Ascending urinary tract infection with cystitis, bilateral ureteritis and pyelitis, and early bilateral pyelonephritis. No renal abscess.    2.  Bilateral nonobstructing intrarenal calculi measuring up to 4 mm on the left. No hydronephrosis.     *Note: Due to a large number of results and/or encounters for the requested time period, some results have not been displayed. A complete set of results can be found in Results Review.         JESSIE MCCARTY MD

## 2023-09-27 NOTE — NURSING NOTE
Is the patient currently in the state of [place]? YES    Visit mode:VIDEO    If the visit is dropped, the patient can be reconnected by: VIDEO VISIT: Text to cell phone:   Telephone Information:   Mobile [phone]       Will anyone else be joining the visit? NO  (If patient encounters technical issues they should call [phone]    How would you like to obtain your AVS? MyChart    Are changes needed to the allergy or medication list? Pt stated no changes to allergies and Pt stated no med changes    Reason for visit: Consult    Michelle NORMAN

## 2023-09-27 NOTE — PROGRESS NOTES
"Virtual Visit Details    Type of service:  Video Visit   Video Start Time: 2:55 PM  Video End Time:3:16 PM    Originating Location (pt. Location): Home    Distant Location (provider location):  Off site  Platform used for Video Visit: [facility]  Transplant Infectious Disease Clinic Note:  Virtual New Patient     Patient:  Lynn Thompson, Date of birth 1965, Medical record number [id number]  Date of Visit:  09/27/2023  Consult requested by Dr. Robles  for evaluation of abx prophy for UTI.          Assessment and Recommendations:   Recommendations:  -Monitor dog bite sites for infection, currently resolved per pt.  -Will not recommend secondary prophy abx  for UTIs at this time. She reports 3 episodes of \"sepsis\" over the last 4 years. This is not often and is and any risk of  infection likely due to her kidney stones which are a nidus for urinary organisms. Therefore, to decrease her further occurrence of infection, addressing the kidney stones (which she is doing with her nephorlogist) and control of her DM will be more beneficial than chronic abx suppression which can lead to side effects and risk of other infections such as c. Diff. She already had an episode of C. Diff in the past. After a lengthy discussion on the risk and benefits of suppressive abx for UTIs, we decided to hold off on abx and control her DM and kidney stones as the risk of C.diff and side effects of abx are higher than the remote benefit she may get from them. She agreed with the plan.    -Up to date on teatnus. Will recommend Shingles, COVID, RSV and Flu vaccine when able.   -recommend tighter glycemic control to limit the risk of infections.    RTC as needed.     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 of note, she had a another dog bite on 9/18/23.     E coli bacteremia and pyelonephritis infection on 6/30/23   Sepsis 2/2 E Coli Pyelonephritis w/ E Coli Bacteremia  - she went to the ER for fevers. Both urine culture and blood cultures have returned positive for pan-sensitive E. Coli and was zoysn/ceftriaxone and discharged on Augmentin for total of 14d abx.     Hx of Recurrent kidney stones:  - stone type primarily calcium oxalate though 2 analysis showed small component of calcium phosphate  9/20/2022 Left URS, laser lithotripsy, stone basketing  4/20/2015 right URS and lithotripsy with stone basketing  3/23/15 right URS, lithotripsy  10/16/2012 left ESWL  3/2011 right stent for ureteral stone and obstruction  1/2011 right URS and stone extraction  3/5/2008 right and left ESWL    Dog Bite on 9/18/23  -tried to break up a fight between their handicap dog and another dog they own, when the handicap dog turned and bit her. The patient reports she sustained a dog bite to the right middle finger as well as a puncture wound to her right thigh. She went to the ER and <0.5 cm Puncture wound to Right thigh several inches proximal to knee joint. Per the ER note: No evidence of infection at this time but wounds were cleaned and dressed will prophylax with Augmentin.  Patient's tetanus is up-to-date (Her last Tdap was 2020 and was for another dog bite).  Dogs are immunized and known no indication to begin rabies series She was given augmentin for 5d. Today during the virtual video visit and states that her hand and leg and almost healed and has no concerns.        Piror infections:    Hx of dog bite-related left hand infection in 2020, Rx with zoysn/augmentin for 10d    Hx of  Prior history of  gram-negative blas bacteremia 2017 years ago, with concern there might be some issues with her biliary duct, but has not had recurrent episodes.     C.diff, first occurrence last year.     Amara Finley MD. 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 of note, she had a another dog bite on 9/18/23.     She is seen over video. She has no complaints and states that her leg and hands are healing well from the dog bite. She has no urinary issues, no fevers, no chills, sob, no cough, n/v/d or abd pain. She states that she had 3 episodes of sepsis from UTIs over the last 4 years. She states that she is asymptomatic except for fevers.     Born in [place], she worked as customer service for health insurance, has dogs and cats. She like to walk and kaytak. No travel outside [place]. No hx of TB. She lives with her . No one is sick at home.   Transplants:  5/10/2013 (Islet); Postoperative day:  3792.  Coordinator Erlinda Multani    Review of Systems:  CONSTITUTIONAL:  No fevers or chills. No night sweats.  EYES: negative for icterus or acute vision changes.   ENT:  negative for hearing loss, tinnitus or sore throat  RESPIRATORY:  negative for cough, sputum, dyspnea  CARDIOVASCULAR:  negative for chest pain, heart palpitations  GASTROINTESTINAL:  negative for nausea, vomiting, diarrhea or constipation  GENITOURINARY:   negative for dysuria or hematuria.  HEME:  No easy bruising or bleeding  INTEGUMENT:  negative for rash or pruritus  NEURO:  Negative for headache or tremor.    Past Medical History:   Diagnosis Date     Benign paroxysmal positional vertigo     occ.      Calculus of kidney 05/2005    x1 on L side passed, several stones.  Has been tested for oxalate.     Chronic abdominal pain 2013     Chronic pancreatitis 2013     Depression     also occ panic spells     Diabetes (H) 5/10/2013    Total Pancreatomy with Auto Islets Transplant     Dyspepsia 1999    H. pylori   treated     Headaches     still periodic HA's ;  often 5X/week     Hypertension 2016    Stress related     Iron deficiency anemia 2003    relates to gastric bypass     Post-pancreatectomy diabetes melltius 2013     Sleep apnea     uses splint     Spasm of sphincter of Oddi     [place] [facility] [place]     Thyroid nodule 2016       Past Surgical History:   Procedure Laterality Date     ABDOMINOPLASTY  2002    Tummy tuck     APPENDECTOMY  1990     BUNIONECTOMY Right 1998     CBD Stent placement  2002    CBD stent; Dr. Presley      SECTION       CHOLECYSTECTOMY       COLONOSCOPY N/A 2021    Procedure: COLONOSCOPY INCOMPLETE Aborted due to incomplete prep  will need to take additional prep and return tomorrow 21;  Surgeon: Ihsan Saenz MD;  Location: [facility]     COMBINED CYSTOSCOPY, RETROGRADES, URETEROSCOPY, LASER HOLMIUM LITHOTRIPSY URETER(S), INSERT STENT Right 2015    Procedure: COMBINED CYSTOSCOPY, RETROGRADES, URETEROSCOPY, LASER HOLMIUM LITHOTRIPSY URETER(S), INSERT STENT;  Surgeon: Kennedi Aldana MD;  Location: [facility]     COMBINED CYSTOSCOPY, RETROGRADES, URETEROSCOPY, LASER HOLMIUM LITHOTRIPSY URETER(S), INSERT STENT Right 2015    Procedure: COMBINED CYSTOSCOPY, RETROGRADES, URETEROSCOPY, LASER HOLMIUM LITHOTRIPSY URETER(S),  INSERT STENT;  Surgeon: Kennedi Aldana MD;  [facility]: [facility]     COSMETIC SURGERY  6/24/2002    Tummy tuck     CYSTECTOMY OVARIAN BENIGN Right      CYSTOSCOPY, RETROGRADES, INSERT STENT URETER(S), COMBINED  10/02/2012    Procedure: COMBINED CYSTOSCOPY, RETROGRADES, INSERT STENT URETER(S);  COMBINED CYSTOSCOPY,  , INSERT LEFT STENT URETER;  Surgeon: Johny Baez MD;  Location: [facility]     CYSTOSCOPY, RETROGRADES, INSERT STENT URETER(S), COMBINED Left 9/20/2022    Procedure: Cystoscopy with left retrograde pyelogram, left ureteroscopy, thulium laser lithotripsy of left ureteral calculus, stone basketing and left ureteral stent insertion;  Surgeon: Alonzo Rome MD;  Location: [facility]     ESOPHAGOSCOPY, GASTROSCOPY, DUODENOSCOPY (EGD), COMBINED N/A 01/24/2018    Procedure: COMBINED ESOPHAGOSCOPY, GASTROSCOPY, DUODENOSCOPY (EGD);  ESOPHAGOSCOPY, GASTROSCOPY, DUODENOSCOPY (EGD)    ;  Surgeon: Tamir Rodgers MD;  Location: [facility]     EXTRACORPOREAL SHOCK WAVE LITHOTRIPSY (ESWL)  10/16/2012    Procedure: EXTRACORPOREAL SHOCK WAVE LITHOTRIPSY (ESWL);  left EXTRACORPOREAL SHOCK WAVE LITHOTRIPSY (ESWL) ;  Surgeon: Johny Baez MD;  Location: [facility]     Gastric bypass NOS       HERNIA REPAIR  02/2015     HYSTERECTOMY SUPRACERVICAL, BILATERAL SALPINGO-OOPHORECTOMY, COMBINED N/A 02/01/2022    Procedure: Abdominal supracervical hysterectomy, bilateral salpingooophrectomy;  Surgeon: Nicole Rivera MD;  [facility]     IRRIGATION AND DEBRIDEMENT HAND, COMBINED Left 10/30/2020    Procedure: Left hand sharp excisional debridement of skin, subcutaneous tissue and fat with a scalpel, 2 x 1 x 1 cm.;  Surgeon: Demian Renteria MD;  Location: [facility]     LAPAROSCOPIC LYSIS ADHESIONS N/A 02/20/2015    Procedure: LAPAROSCOPIC LYSIS ADHESIONS;  Surgeon: Aaron Early MD;  [facility]     LAPAROSCOPIC LYSIS ADHESIONS N/A 12/29/2015    Procedure: LAPAROSCOPIC LYSIS ADHESIONS;  Surgeon: Aaron Early  MD;  [facility]     PANCREATECTOMY, TRANSPLANT AUTO ISLET CELL, COMBINED  05/10/2013    Procedure: COMBINED PANCREATECTOMY, TRANSPLANT AUTO ISLET CELL;  Pancreatectomy, Auto Islet Cell Transplant   hernia repair, jejunostomy tube and liver biopsies with Anesthesia General with block;  Surgeon: Aaron Early MD;  [facility]     Partial ileum resection       RECTOPEXY ABDOMINAL N/A 2022    Procedure: RECTOPEXY, ABDOMINAL;  Surgeon: Uriah Sheridan MD;  [facility]     REPAIR PTOSIS BROW BILATERAL Bilateral 2020    Procedure: BILATERAL BROW PTOSIS REPAIR;  Surgeon: Denise Alberts MD;  Location: [facility]     SACROCOLPOPEXY, CYSTOSCOPY, COMBINED N/A 2022    Procedure: Uterosacral ligament suspension, pina colposuspension with Cystoscopy;  Surgeon: Nicole Rivera MD;  [facility]     SIGMOIDOSCOPY FLEXIBLE N/A 2022    Procedure: SIGMOIDOSCOPY, FLEXIBLE;  Surgeon: Uriah Sheridan MD;  [facility]     Surgery for SBO       TONSILLECTOMY, ADENOIDECTOMY, COMBINED  1997     TRANSPLANT  5/10/13    Pancreatic Auto-Islet Transplant       Family History   Problem Relation Age of Onset     Family History Negative Mother      Respiratory Father         COPD;  at 69     Genitourinary Problems Father         kidney stones     Substance Abuse Father      Depression Father      Asthma Father      Heart Disease Paternal Grandfather         M.I.     Coronary Artery Disease Paternal Grandfather      Hyperlipidemia Paternal Grandfather      Genitourinary Problems Brother         multiple brothers with kidney stones     Gastrointestinal Disease Maternal Grandmother         undiagnosed 'gut' issues     Coronary Artery Disease Maternal Grandfather      Hyperlipidemia Maternal Grandfather      Cerebrovascular Disease Paternal Grandmother         At the age of 103     Anxiety Disorder Paternal Grandmother      Osteoporosis Paternal Grandmother       Anxiety Disorder Son      Anxiety Disorder Daughter      Asthma Daughter      Colon Cancer No family hx of        Social History     Social History Narrative     Not on file     Social History     Tobacco Use     Smoking status: Never     Smokeless tobacco: Never   Vaping Use     Vaping Use: Never used   Substance Use Topics     Alcohol use: Not Currently     Drug use: Not Currently       Immunization History   Administered Date(s) Administered     COVID-19 Bivalent 12+ (Pfizer) 10/18/2022     COVID-19 MONOVALENT 12+ (Pfizer) 03/12/2021, 04/02/2021, 11/01/2021     COVID-19 Monovalent 12+ (Pfizer 2022) 07/11/2022     FLU 6-35 months 11/03/2009     HIB (PRP-T) 04/23/2013     Influenza (High Dose) 3 valent vaccine 11/02/2015, 10/10/2016, 10/31/2018, 10/31/2019     Influenza (IIV3) PF 11/07/1996, 11/23/2001, 10/25/2004, 12/01/2005, 11/02/2010, 10/11/2011, 10/09/2012, 11/01/2014     Influenza Vaccine 18-64 (Flublok) 11/04/2022     Influenza Vaccine 65+ (Fluzone HD) 12/02/2021     Influenza Vaccine >6 months (Alfuria,Fluzone) 10/16/2017     Meningococcal ACWY (Menactra ) 04/23/2013     Pneumococcal 23 valent 12/10/2003, 05/07/2013, 07/09/2019     TD,PF 7+ (Tenivac) 01/01/1996     TDAP Vaccine (Adacel) 10/26/2010, 08/17/2020       Patient Active Problem List   Diagnosis     Iron deficiency anemia secondary to inadequate dietary iron intake     Gastric bypass status for obesity     Health Care Home     Chronic pain syndrome     Exocrine pancreatic insufficiency     Asplenia     BLU (obstructive sleep apnea)     H/O of rectopexy     Dysthymia     Anxiety     Thyroid nodule     Acquired hypothyroidism     Post-pancreatectomy diabetes (H)     Other iron deficiency anemia     Uterovaginal prolapse, unspecified     Rectal prolapse     Small bowel obstruction (H)     Urge incontinence     Urinary urgency     High-tone pelvic floor dysfunction     Pyuria     Recurrent kidney stones     History of uterine prolapse     Vaginal  atrophy     Enteric hyperoxaluria     Hypocitraturia     Acute pyelonephritis     Acute sepsis (H)       No outpatient medications have been marked as taking for the 9/27/23 encounter (Appointment) with Amara Finley MD.       Allergies   Allergen Reactions     Corticosteroids Other (See Comments)     All oral, IV and injectable steroids are contraindicated (unless in life threatening situations) in Islet Auto transplant recipients. They can cause irreversible loss of islet cell function. Please contact patient's transplant care coordinator, Erlinda Multani RN BSN at [phone]/pager: [phone] and endocrinologist prior to administration.       Povidone Iodine Hives     Causes skin to blister     Nsaids      naprosyn = GI upset     Sulfasalazine Nausea and Nausea and Vomiting              Physical Exam:   Vitals were reviewed.  All vitals stable  LMP 12/19/2013   Wt Readings from Last 4 Encounters:   09/18/23 59.4 kg (130 lb 15.3 oz)   09/07/23 59.4 kg (130 lb 15.3 oz)   09/07/23 59.4 kg (130 lb 15.3 oz)   07/01/23 56.7 kg (125 lb)       Exam:  GENERAL: well-developed, well-nourished, alert, oriented, in no acute distress over video  HEAD: Head is normocephalic, atraumatic   EYES: Eyes have anicteric sclerae.    NEUROLOGIC: Grossly nonfocal.         Laboratory Data:     No results found for: ACD4    Inflammatory Markers    Recent Labs   Lab Test 10/30/20  0633   SED 13   CRP 21.8*       Immune Globulin Studies   No lab results found.    Metabolic Studies    Recent Labs   Lab Test 09/07/23  1232 09/07/23  1130 09/07/23  0930 07/03/23  0725 07/01/23  0618 06/30/23  2019 04/03/23  1445 01/05/23  1342 05/19/16  1003 05/12/16  1003   NA  --   --  143 141 139 135*   < > 141   < > 137   POTASSIUM  --   --  4.3 4.1 4.2 4.2   < > 4.2   < > 4.3   CHLORIDE  --   --  105 106 108* 100   < > 106   < > 100   CO2  --   --  28 28 23 24   < > 21*   < > 33*   ANIONGAP  --   --  10 7 8 11   < > 14   < > 4   BUN  --   --   24.1* 13.4 16.3 22.5   < > 21.9   < > 13   CR  --   --  0.91 0.88 0.90 0.97*   < > 0.94   < > 0.81   GFRESTIMATED  --   --  72 75 73 67   < > 70   < > 74   *   < > 183*  183* 130* 101* 338*   < > 111*   < > 161*   VALARIE  --   --  9.6 9.2 8.5* 9.1   < > 9.0   < > 9.1   PHOS  --   --   --   --   --   --   --  4.2   < >  --    MAG  --   --   --   --   --   --   --  1.8   < >  --    LACT  --   --   --   --   --  1.4  --   --    < >  --    CKT  --   --   --   --   --   --   --   --   --  54    < > = values in this interval not displayed.       Hepatic Studies    Recent Labs   Lab Test 09/07/23 0930 06/30/23  2019 04/03/23  1445 10/31/22  0741 09/19/22  1538   BILITOTAL 0.4 0.3 0.2   < > <0.2   DBIL  --   --   --   --  <0.20   ALKPHOS 112* 132* 122*   < > 151*   PROTTOTAL 6.7 6.9 6.7   < > 7.1   ALBUMIN 4.0 3.8 4.3   < > 4.2   AST 47* 23 30   < > 44*   ALT 69* 21 30   < > 56*    < > = values in this interval not displayed.       Pancreatitis testing    Recent Labs   Lab Test 09/07/23 0930 01/05/23  1342 09/15/22  0948 11/23/21  2136 08/17/21  1131 03/01/21  0208 02/28/21  0031   LIPASE  --   --   --  10*  --  17* 19*   TRIG 71 89   < >  --    < >  --   --     < > = values in this interval not displayed.       Lipid testing    Recent Labs   Lab Test 09/07/23 0930 01/05/23  1342 09/15/22  0948   CHOL 174 230* 246*   HDL 88 97 97   LDL 72 115* 122*   TRIG 71 89 134       Gout Labs    No lab results found.    Hematology Studies   Recent Labs   Lab Test 09/07/23  0930 08/11/23  1037 07/03/23  0725 07/01/23  0618 09/03/21  1244 05/10/21  1120 03/15/21  1411 03/01/21  0208   WBC 6.3 7.0 8.6 13.2*   < > 6.0   < > 12.6*   ANEU  --   --   --   --   --  3.8  --  10.9*   ANEUTAUTO 3.8 4.2  --   --    < >  --   --   --    ALYM  --   --   --   --   --  1.4  --  0.8   ALYMPAUTO 1.5 1.9  --   --    < >  --   --   --    MADISON  --   --   --   --   --  0.7  --  0.8   AMONOAUTO 0.7 0.7  --   --    < >  --   --   --    AEOS  --   --    --   --   --  0.1  --  0.1   AEOSAUTO 0.2 0.1  --   --    < >  --   --   --    ABSBASO 0.1 0.1  --   --    < >  --   --   --    HGB 12.0 11.4* 10.8* 10.2*   < > 10.7*   < > 11.5*   HCT 36.8 36.6 33.5* 32.3*   < > 34.5*   < > 38.8    528* 478* 347   < > 404   < > 335    < > = values in this interval not displayed.       Clotting Studies    Recent Labs   Lab Test 05/10/16  2019   INR 1.03       Iron Testing    Recent Labs   Lab Test 09/07/23  0930 08/11/23  1037 04/03/23  1445 03/24/23  1325 09/19/22  1538 09/15/22  0948 09/03/21  1243 05/10/21  1120   IRON 110 38  --  118   < > 56   < > 65   * 307  --  310   < > 283   < > 378   IRONSAT 49* 12*  --  38   < > 20   < > 17   GRISELDA 737* 51  --  44   < > 172   < > 7*   MCV 89 90   < > 92   < > 96   < > 91   FOLIC  --   --   --   --   --   --   --  18.6   B12 330  --   --   --   --  418   < > 274    < > = values in this interval not displayed.       Markers  No lab results found.    Invalid input(s): FETOPROTEIN, SERUM, AFP    Autoimmune Testing   No lab results found.    Invalid input(s): ANCAB, PANCA, CANCA    Arterial Blood Gas Testing    Recent Labs   Lab Test 02/01/22  1153   O2PER 40.0        Thyroid Studies     Recent Labs   Lab Test 03/24/23  1325 01/05/23  1342 08/17/21  1131 08/17/21  1131 03/15/21  1411 09/17/20  0727   TSH 0.50 6.56*  --  2.75 2.93 3.04   T4 1.26 0.71*   < > 1.06  --   --    T3  --   --   --  64  --   --     < > = values in this interval not displayed.       Urine Studies     Recent Labs   Lab Test 06/30/23  1944 12/13/22  0759 09/19/22  1639 06/23/22  1226 06/14/22  2132 05/08/22  1642 04/16/22  2353 02/03/22  1221 02/28/21  0218 06/11/20  0833   URINEPH 5.5 6.0 5.5 6.0 6.0   < > 5.5 6.0   < > 5.5   NITRITE Negative Negative Negative Negative Negative   < > Negative Negative   < > Positive*   LEUKEST Large* Small* Moderate* Negative Large*   < > Negative Moderate*   < > Large*   WBCU >182*  --  39*  --  116*  --  1 9*   < > >182*    UWBCLM  --   --   --   --   --   --   --   --   --  Present*    < > = values in this interval not displayed.       Medication levels  No lab results found.    Invalid input(s): AMIK    CSF testing     Recent Labs   Lab Test 02/28/21  0517   CWBC 1  Test not performed. Criteria not met for second cell count.   CRBC 0  Test not performed. Criteria not met for second cell count.   CGLU 69   CTP 27       Microbiology:  Fungal testing  No lab results found.    Invalid input(s): HIFUN, FUNGL    Beta D Glucan levels (Fungitell assay)    No results found for: FGTL, FGTLI     Last Culture results   Rapid Strep A Screen   Date Value Ref Range Status   12/30/2014   Final    NEGATIVE: No Group A streptococcal antigen detected by immunoassay, await   culture report.       Culture   Date Value Ref Range Status   07/01/2023 No Growth  Final   07/01/2023 No Growth  Final   06/30/2023 No Growth  Final   06/30/2023 Positive on the 1st day of incubation (A)  Final   06/30/2023 Escherichia coli (AA)  Final     Comment:     1 of 2 bottles   06/30/2023 >100,000 CFU/mL Escherichia coli (A)  Final   09/19/2022 No Growth  Final   09/19/2022 No Growth  Final   09/19/2022 50,000-100,000 CFU/mL Proteus mirabilis (A)  Final   06/14/2022 10,000-50,000 CFU/mL Proteus mirabilis (A)  Final   06/14/2022 10,000-50,000 CFU/mL Proteus mirabilis (A)  Final   04/16/2022 No Growth  Final   04/16/2022 No Growth  Final   02/03/2022 No Growth  Final     Culture Micro   Date Value Ref Range Status   03/01/2021 No growth  Final   03/01/2021 No growth  Final   02/28/2021 No growth  Final   02/28/2021 No growth  Final   02/28/2021 No growth  Final   10/30/2020 No anaerobes isolated  Final   10/30/2020 No growth  Final   10/28/2020 No growth  Final   10/28/2020 No growth  Final   06/11/2020 >100,000 colonies/mL  Escherichia coli   (A)  Final     Escherichia coli   Date Value Ref Range Status   06/30/2023 Detected (A) Not Detected Final     Comment:      Positive for Escherichia coli by Verigene multiplex nucleic acid test. Final identification and antimicrobial susceptibility testing will be verified by standard methods. Verigene test will not distinguish E. coli from Shigella species including Shigella dysenteriae, Shigella flexneri, Shigella boydii, and Shigella sonnei. Specimens containing Shigella species or E. coli will be reported as positive for E. coli.         Last checks of Clostridioides difficile testing  Recent Labs   Lab Test 04/17/22  0200 11/18/17  1900 08/27/17  1248 04/03/16  1700   CDBPCT Positive* Negative Negative Negative  Negative: Clostridium difficile target DNA sequences NOT detected, presumed   negative for Clostridium difficile toxin B or the number of bacteria present   may be below the limit of detection for the test.   FDA approved assay performed using [employer]Stay[employer] real-time PCR.   A negative result does not exclude actual disease due to Clostridium difficile   and may be due to improper collection, handling and storage of the specimen or   the number of organisms in the specimen is below the detection limit of the   assay.         No components found for: AFBSTN    Syphilis Testing  Invalid input(s): [id number]    Tick Testing  No lab results found.    Invalid input(s): APHAGM    ASO Testing  Invalid input(s): [id number]    Quantiferon testing   Recent Labs   Lab Test 09/07/23  0930 08/11/23  1037   LYMPH 24 27       Infection Studies to assess Diarrhea  Recent Labs   Lab Test 03/02/21  0850 11/18/17  1900 04/03/16  1700   EPSTX1 Not Detected Not Detected Not Detected   EPSTX2 Not Detected Not Detected Not Detected   EPCAMP Detected, Abnormal Result* Not Detected Not Detected   EPSALM Not Detected Not Detected Not Detected   EPSHGL Not Detected Not Detected Not Detected   EPVIB Not Detected Not Detected Not Detected   EPROTA Not Detected Not Detected Not Detected   EPNORO Not Detected Not Detected Not Detected   EPYER Not Detected  Not Detected Not Detected       Virology:  Coronavirus-19 testing    Recent Labs   Lab Test 06/30/23  2238 09/19/22 2029 04/17/22  0054 01/28/22  1047 08/10/21  1934 03/28/21  1153   [id number] Negative Negative Negative Negative   < > Test received-See reflex to IDDL test SARS CoV2 (COVID-19) Virus RT-PCR  NEGATIVE   [id number]  --   --   --   --   --  Nasopharyngeal   [id number]  --   --   --   --   --  Nasopharyngeal    < > = values in this interval not displayed.       Respiratory virus (non-coronavirus-19) testing    No lab results found.    CMV viral loads  No results found for: CMVQNT, CMVRESINST, CMVLOG, 36733, 52011, 76498, 02520    CMV resistance testing  No lab results found.  No results found for: CMVCID, CMVFOS, CMVGAN    No results found for: H6RES    No results found for: EBVDN, EBRES, EBVDN, EBVSP, EBVPC, EBVPCR    BK viral loads No lab results found.    Parvovirus Testing  No lab results found.    Invalid input(s): PRVRES    Adenovirus Testing  No lab results found.    Invalid input(s): ADENAB, ADENOVIRUS, ADQT    Hepatitis B Testing   No lab results found.  Was the last Hepatitis B E antigen positive?   No results found for: HBEAGN     Hepatitis C Antibody   Date Value Ref Range Status   03/06/2017  NR Final    Nonreactive   Assay performance characteristics have not been established for newborns,   infants, and children         No results found for: CMVIGG, CMVM, CMVIM, CMIG, CMVG, CMIGG, CMIM, CMVIGM, CMLTX, HSVG1, HSVG2, HSVTP1, DU9447, HS12M, HS12GR, HS1GR, HS2GR, HSIM, HSIG, HSIGR, HSVIGMAB, HSVG1, VZVIGG, VARICZOSAB  No results found for: EBVCAG, EBIG2, EBIGM, EBVIGG, EBIGG, EBVAGN, NE2118, TOXG  No results found for: H1IGG, H2IGG, EBVCAM    No components found for: USM2389    Last Pathology Report   Case Report   Date Value Ref Range Status   02/01/2022   Final    Surgical Pathology Report                         Case: [id number]                                  Authorizing Provider:   Nicole Rivera MD  Collected:           02/01/2022 10:36 AM          Ordering Location:     [facility]                 Received:            02/01/2022 02:26 PM          Pathologist:           Gilma Whyte MD                                                             Specimen:    Uterus, Bilateral Fallopian Tubes & Ovaries, Uterus, bilateral fallopian tubes, and                 ovaries.                                                                                    Clinical Information   Date Value Ref Range Status   02/01/2022   Final    57-year-old female history of rectal prolapse       Final Diagnosis   Date Value Ref Range Status   02/01/2022   Final     Uterus, bilateral fallopian tubes, and ovaries, supracervical hysterectomy with bilateral salpingo-oophorectomy:  -Inactive endometrium  -Adenomyosis  -Bilateral fallopian tubes, no significant histologic abnormalities  -Bilateral ovaries with atrophic changes  -Negative for malignancy           Imaging:  Results for orders placed or performed during the hospital encounter of 06/30/23   CT Abdomen Pelvis w Contrast    Narrative    EXAM: CT ABDOMEN PELVIS W CONTRAST  LOCATION: [facility]  DATE: 6/30/2023    INDICATION: Left flank pain. Urinary tract infection. Fever. Sepsis.  COMPARISON: CT abdomen pelvis 11/23/2021.  TECHNIQUE: CT scan of the abdomen and pelvis was performed following injection of IV contrast. Multiplanar reformats were obtained. Dose reduction techniques were used.  CONTRAST: 63mL Isovue 370    FINDINGS:   LOWER CHEST: Normal.    HEPATOBILIARY: Status post cholecystectomy and hepaticojejunostomy with unchanged expected pneumobilia. No liver lesions.     PANCREAS: Surgically absent.    SPLEEN: Surgically absent.    ADRENAL GLANDS: Normal.    KIDNEYS/BLADDER: Mild cortical scarring at the left upper renal pole.  Few faint patchy hypoenhancing areas in the left upper renal pole and right mid to upper pole, suspicious for pyelonephritis. No renal abscess. Diffuse urothelial thickening and   enhancement throughout both renal pelves and ureters. Cluster of nonobstructing calculi at the left lower pole, the largest of which measures 4 mm. Nonobstructing 2 mm right lower pole renal calculus. No ureteral calculi or hydronephrosis. Mild diffuse   urinary bladder wall thickening with mild mucosal enhancement.    BOWEL: Status post gastric bypass and ileocecectomy. No evidence of bowel obstruction or inflammation. Moderate amount of stool within the ascending colon. Small amount of stool within the descending and proximal sigmoid colon.    LYMPH NODES: No enlarged lymph nodes. Multiple prominent but nonenlarged lymph nodes are present within the central mesentery, similar to prior.    VASCULATURE: Patent portal and superior mesenteric veins. Nonaneurysmal abdominal aorta.    PELVIC ORGANS: Uterus is absent.    MUSCULOSKELETAL: No aggressive bone lesions or acute bony abnormality.      Impression    IMPRESSION:     1.  Ascending urinary tract infection with cystitis, bilateral ureteritis and pyelitis, and early bilateral pyelonephritis. No renal abscess.    2.  Bilateral nonobstructing intrarenal calculi measuring up to 4 mm on the left. No hydronephrosis.     *Note: Due to a large number of results and/or encounters for the requested time period, some results have not been displayed. A complete set of results can be found in Results Review.

## 2023-09-28 ENCOUNTER — TELEPHONE (OUTPATIENT)
Dept: ENDOCRINOLOGY | Facility: CLINIC | Age: 58
End: 2023-09-28

## 2023-09-28 NOTE — TELEPHONE ENCOUNTER
[facility]    Fax: [phone]    Spec: [phone]    MO: [phone]

## 2023-09-29 NOTE — TELEPHONE ENCOUNTER
Central Prior Authorization Team   Phone: [phone]    PA Initiation    Medication: VIOKACE [id number] UNITS PO TABS  Insurance Company: [employer] ([facility]) - Phone [phone] Fax [phone]  Pharmacy Filling the Rx: [facility] - [place], [place] - [address]  Filling Pharmacy Phone:    Filling Pharmacy Fax:    Start Date: 9/29/2023

## 2023-10-09 NOTE — TELEPHONE ENCOUNTER
[facility] Prior Authorization Team   Phone: [phone]  Fax: [phone]    PRIOR AUTHORIZATION DENIED    Medication: VIOKACE [id number] UNITS PO TABS  Insurance Company: [employer] ([facility]) - Phone [phone] Fax [phone]  Denial Date: 9/29/2023  Denial Rational: We denied the request as not medically necessary because the information provided to us  indicates that your request for VIOKACE TAB [id number] does not meet the applicable medical  necessity pharmacy criteria as outlined in our pharmacy policy titled  Appeal Information: Appeals Department  [address] [address]  [place] [address]  Phone: [phone]  Fax: [phone]    Patient Notified: Yes

## 2023-10-30 DIAGNOSIS — E13.9 POST-PANCREATECTOMY DIABETES (H): ICD-10-CM

## 2023-10-30 DIAGNOSIS — Z90.410 POST-PANCREATECTOMY DIABETES (H): ICD-10-CM

## 2023-10-30 DIAGNOSIS — E89.1 POST-PANCREATECTOMY DIABETES (H): ICD-10-CM

## 2023-10-30 RX ORDER — INSULIN PMP CART,AUT,G6/7,CNTR
EACH SUBCUTANEOUS
Qty: 30 EACH | Refills: 5 | Status: SHIPPED | OUTPATIENT
Start: 2023-10-30

## 2023-11-02 ENCOUNTER — TELEPHONE (OUTPATIENT)
Dept: TRANSPLANT | Facility: CLINIC | Age: 58
End: 2023-11-02
Payer: COMMERCIAL

## 2023-11-02 NOTE — TELEPHONE ENCOUNTER
Medication Appeal Initiation    Medication: VIOKACE [id number] UNITS PO TABS  Appeal Start Date:  11/2/2023  Insurance Company: [employer]  Insurance Phone: [phone]  Insurance Fax: [phone]  Comments:

## 2023-11-02 NOTE — TELEPHONE ENCOUNTER
Marion from Appeals Dept., at [facility] regarding appeals left voice message at 1:15pm 11/02/2023.  Marion # [phone]   Fax# [phone].  They need more member consent to move forward.      Please call Marion # [phone]

## 2023-11-02 NOTE — TELEPHONE ENCOUNTER
[facility] for Marion with direct contact information.     Per Marion's message to coordinator, [facility] has faxed over member consent form to our office. Let Marion know that fax number used was a non-existent number thus neither the transplant providers nor the member have received the consent form.     Also explained that downgrading the appeal to non-urgent was inappropriate and dangerous as patient is s/p Total Pancreatectomy-Islet Auto Transplant and thus requires enzymes for digestion of all oral nutrients.     Requested that a new copy of the member consent form be faxed to [phone] and marked urgent as patient is out of all Viokace at this time.

## 2023-11-03 NOTE — TELEPHONE ENCOUNTER
Called [employer] to check on status of appeal was told that they were still waiting on patients consent form   Per Erlinda PALACIOS (coordinator) stated that they sent that in 11/2/2023 around 5:30p.m.     I asked to get a copy to send in myself however erlinda was afraid submitting multiple requests might get messier.  Will follow up again later to see if that has been received

## 2023-11-06 ENCOUNTER — TRANSFERRED RECORDS (OUTPATIENT)
Dept: HEALTH INFORMATION MANAGEMENT | Facility: CLINIC | Age: 58
End: 2023-11-06
Payer: COMMERCIAL

## 2023-11-08 ENCOUNTER — TRANSFERRED RECORDS (OUTPATIENT)
Dept: HEALTH INFORMATION MANAGEMENT | Facility: CLINIC | Age: 58
End: 2023-11-08

## 2023-11-08 NOTE — TELEPHONE ENCOUNTER
MEDICATION APPEAL DENIED    Medication: VIOKACE [id number] UNITS PO TABS  Insurance Company: [employer]  Denial Date: 11/8/2023  Denial Rational:   PER DAYANA IS GOING TO HAVE PROVIDER DO A PEER TO PEER REVIEW  INFORMED DAYANA IF SHE NEEDS ASSISTANCE TO PLEASE FEEL FREE TO REACH OUT TO ME           Second Level Appeal Information:           Patient Notified: N/A  Central Prior Authorization Team ONLY: Second level appeals will be managed by the clinic staff and provider. Please contact the [employer] Prior Authorization Team if additional information about the denial is needed.

## 2023-11-09 DIAGNOSIS — Z90.410 ACQUIRED ABSENCE OF PANCREAS: Primary | ICD-10-CM

## 2023-11-10 ENCOUNTER — LAB (OUTPATIENT)
Dept: INFUSION THERAPY | Facility: CLINIC | Age: 58
End: 2023-11-10
Attending: PHYSICIAN ASSISTANT
Payer: COMMERCIAL

## 2023-11-10 ENCOUNTER — ONCOLOGY VISIT (OUTPATIENT)
Dept: ONCOLOGY | Facility: CLINIC | Age: 58
End: 2023-11-10
Attending: PHYSICIAN ASSISTANT
Payer: COMMERCIAL

## 2023-11-10 VITALS
SYSTOLIC BLOOD PRESSURE: 151 MMHG | BODY MASS INDEX: 23.24 KG/M2 | WEIGHT: 135.4 LBS | DIASTOLIC BLOOD PRESSURE: 83 MMHG | RESPIRATION RATE: 16 BRPM | OXYGEN SATURATION: 99 % | HEART RATE: 58 BPM

## 2023-11-10 DIAGNOSIS — D50.8 OTHER IRON DEFICIENCY ANEMIA: ICD-10-CM

## 2023-11-10 DIAGNOSIS — D50.9 IRON DEFICIENCY ANEMIA, UNSPECIFIED IRON DEFICIENCY ANEMIA TYPE: Primary | ICD-10-CM

## 2023-11-10 LAB
BASOPHILS # BLD AUTO: 0.1 10E3/UL (ref 0–0.2)
BASOPHILS NFR BLD AUTO: 2 %
EOSINOPHIL # BLD AUTO: 0.1 10E3/UL (ref 0–0.7)
EOSINOPHIL NFR BLD AUTO: 1 %
ERYTHROCYTE [DISTWIDTH] IN BLOOD BY AUTOMATED COUNT: 15.2 % (ref 10–15)
FERRITIN SERPL-MCNC: 255 NG/ML (ref 11–328)
HCT VFR BLD AUTO: 36.1 % (ref 35–47)
HGB BLD-MCNC: 11.6 G/DL (ref 11.7–15.7)
IMM GRANULOCYTES # BLD: 0 10E3/UL
IMM GRANULOCYTES NFR BLD: 0 %
IRON BINDING CAPACITY (ROCHE): 229 UG/DL (ref 240–430)
IRON SATN MFR SERPL: 35 % (ref 15–46)
IRON SERPL-MCNC: 81 UG/DL (ref 37–145)
LYMPHOCYTES # BLD AUTO: 2.2 10E3/UL (ref 0.8–5.3)
LYMPHOCYTES NFR BLD AUTO: 35 %
MCH RBC QN AUTO: 29.3 PG (ref 26.5–33)
MCHC RBC AUTO-ENTMCNC: 32.1 G/DL (ref 31.5–36.5)
MCV RBC AUTO: 91 FL (ref 78–100)
MONOCYTES # BLD AUTO: 0.6 10E3/UL (ref 0–1.3)
MONOCYTES NFR BLD AUTO: 9 %
NEUTROPHILS # BLD AUTO: 3.5 10E3/UL (ref 1.6–8.3)
NEUTROPHILS NFR BLD AUTO: 53 %
NRBC # BLD AUTO: 0 10E3/UL
NRBC BLD AUTO-RTO: 0 /100
PLATELET # BLD AUTO: 325 10E3/UL (ref 150–450)
RBC # BLD AUTO: 3.96 10E6/UL (ref 3.8–5.2)
WBC # BLD AUTO: 6.4 10E3/UL (ref 4–11)

## 2023-11-10 PROCEDURE — 82728 ASSAY OF FERRITIN: CPT | Performed by: PHYSICIAN ASSISTANT

## 2023-11-10 PROCEDURE — 99213 OFFICE O/P EST LOW 20 MIN: CPT | Performed by: PHYSICIAN ASSISTANT

## 2023-11-10 PROCEDURE — 85004 AUTOMATED DIFF WBC COUNT: CPT | Performed by: PHYSICIAN ASSISTANT

## 2023-11-10 PROCEDURE — 83550 IRON BINDING TEST: CPT | Performed by: PHYSICIAN ASSISTANT

## 2023-11-10 PROCEDURE — 36415 COLL VENOUS BLD VENIPUNCTURE: CPT

## 2023-11-10 ASSESSMENT — PAIN SCALES - GENERAL: PAINLEVEL: NO PAIN (0)

## 2023-11-10 NOTE — LETTER
"    11/10/2023         RE: Lynn Thompson  [address] [address]        Dear Colleague,    Thank you for referring your patient, Lynn Thompson, to the [facility]. Please see a copy of my visit note below.    Oncology Rooming Note    November 10, 2023 1:07 PM   Lynn Thompson is a 58 year old female who presents for:    Chief Complaint   Patient presents with     Oncology Clinic Visit     Initial Vitals: BP (!) 151/83   Pulse 58   Resp 16   Wt 61.4 kg (135 lb 6.4 oz)   LMP 12/19/2013   SpO2 99%   BMI 23.24 kg/m   Estimated body mass index is 23.24 kg/m  as calculated from the following:    Height as of 7/1/23: 1.626 m (5' 4\").    Weight as of this encounter: 61.4 kg (135 lb 6.4 oz). Body surface area is 1.67 meters squared.  No Pain (0) Comment: Data Unavailable   Patient's last menstrual period was 12/19/2013.  Allergies reviewed: Yes  Medications reviewed: Yes    Medications: Medication refills not needed today.  Pharmacy name entered into [employer]:    [id number] - [place], [place] - [address] [facility] - [place], [place] - [address]  [id number] - [place], [place] - [address]    Clinical concerns:   PA was notified.      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        Lynn says that she has had anemia all of her life.  She was previously followed by Dr. Tom Malcolm at [facility] since 2004.  She was followed and treated for " iron-deficiency anemia.  It was felt that it was due to poor absorption from her gastric bypass and various bowel surgeries in the past.  She was unable to tolerate oral and and could not absorb it.  She gets IV iron intermittently.  She says a round of IV iron would last her for several years.  She last saw Dr. Malcolm in .       Patient has been anemic again.  She underwent colonoscopy on 21, which found some polyps that were removed.  Due to unintentional weight loss, she underwent mammogram that was negative.  She had a CT abdomen/pelvis on 3/1/21 that was normal, other than non-specific proximal colitis.  She does not smoke.       She received Injectafer x 2 doses in May 2021. Received Feraheme 2023.     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 Ongoing fatigue stable. Leg cramps okay right now. We discussed currently adequate iron.   With Feraheme in August she did have she did have significant body aches that improved with additional antihistamine.     Review of Systems:  A complete review of systems was negative except as noted in the HPI.     Past medical, Surgical, and social history:  Reviewed    Physical Examination:  General: Pleasant patient in no acute distress. No pallor. Normal work of breathing. Oriented and alert.     Laboratory Data:  CBC RESULTS:   Recent Labs   Lab Test 11/10/23  1258   WBC 6.4   RBC 3.96   HGB 11.6*   HCT 36.1   MCV 91   MCH 29.3   MCHC 32.1   RDW 15.2*        Ferritin   Date Value Ref Range Status   2023 737 (H) 11 - 328 ng/mL Final   05/10/2021 7 (L) 8 - 252 ng/mL Final     Iron   Date Value Ref Range Status   11/10/2023 81 37 - 145 ug/dL Final   05/10/2021 65 35 - 180 ug/dL Final     Iron Binding Cap   Date Value Ref Range Status   05/10/2021 378 240 - 430 ug/dL Final     Iron Binding Capacity   Date Value Ref Range Status   11/10/2023 229 (L) 240 - 430 ug/dL Final   2022 293 240 - 430 ug/dL Final         Assessment  and Plan:  Lynn Thompson is a 58 year old female with history of multiple bowel surgeries and lifelong iron deficiency anemia.     1. Anemia : due to iron deficiency  2. History of Iron deficiency  Patient has long-standing history of iron deficiency anemia since age 9 months.  Last received IV Feraheme 8/2023. She had body aches with this and would benefit from pre-med Benadryl/Tylenol with future doses.      She is up to date on GI screenings, most recent colonoscopy with last 4/2021.  She has iron deficiency now.  History of gastric bypass.  Injectafer x2.      Follow-up with Dr. Montero in 3 months with labs prior    20 minutes spent on the date of the encounter doing chart review, review of test results, interpretation of tests, patient visit and documentation     Haydee Krishnamurthy PA-C  [facility]   [phone]             Again, thank you for allowing me to participate in the care of your patient.        Sincerely,        HAYDEE KRISHNAMURTHY PA-C

## 2023-11-10 NOTE — PROGRESS NOTES
Medical Assistant Note:  Lynn Thompson presents today for blood draw.    Patient seen by provider today: Yes: risa.   present during visit today: Not Applicable.    Concerns: No Concerns.    Procedure:  Lab draw site: rac, Needle type: bf, Gauge: 23.    Post Assessment:  Labs drawn without difficulty: Yes.    Discharge Plan:  Departure Mode: Ambulatory.    Face to Face Time: 5 min.    Caridad Sinclair, CMA

## 2023-11-10 NOTE — PROGRESS NOTES
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        Lynn says that she has had anemia all of her life.  She was previously followed by Dr. Tom Malcolm at [facility] since .  She was followed and treated for iron-deficiency anemia.  It was felt that it was due to poor absorption from her gastric bypass and various bowel surgeries in the past.  She was unable to tolerate oral and and could not absorb it.  She gets IV iron intermittently.  She says a round of IV iron would last her for several years.  She last saw Dr. Malcolm in .       Patient has been anemic again.  She underwent colonoscopy on 21, which found some polyps that were removed.  Due to unintentional weight loss, she underwent mammogram that was negative.  She had a CT abdomen/pelvis on 3/1/21 that was normal, other than non-specific proximal colitis.  She does not smoke.       She received Injectafer x 2 doses in May 2021. Received Feraheme 2023.     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 Ongoing fatigue stable. Leg cramps okay right now. We discussed currently adequate iron.   With Feraheme in August she did have she did have significant body aches that improved with additional antihistamine.     Review of Systems:  A complete review of systems was negative except as noted in the HPI.     Past medical, Surgical, and social history:  Reviewed    Physical Examination:  General: Pleasant patient in no acute distress. No pallor. Normal work of breathing. Oriented and alert.     Laboratory Data:  CBC RESULTS:    Recent Labs   Lab Test 11/10/23  1258   WBC 6.4   RBC 3.96   HGB 11.6*   HCT 36.1   MCV 91   MCH 29.3   MCHC 32.1   RDW 15.2*        Ferritin   Date Value Ref Range Status   09/07/2023 737 (H) 11 - 328 ng/mL Final   05/10/2021 7 (L) 8 - 252 ng/mL Final     Iron   Date Value Ref Range Status   11/10/2023 81 37 - 145 ug/dL Final   05/10/2021 65 35 - 180 ug/dL Final     Iron Binding Cap   Date Value Ref Range Status   05/10/2021 378 240 - 430 ug/dL Final     Iron Binding Capacity   Date Value Ref Range Status   11/10/2023 229 (L) 240 - 430 ug/dL Final   03/07/2022 293 240 - 430 ug/dL Final         Assessment and Plan:  Lynn Thompson is a 58 year old female with history of multiple bowel surgeries and lifelong iron deficiency anemia.     1. Anemia : due to iron deficiency  2. History of Iron deficiency  Patient has long-standing history of iron deficiency anemia since age 9 months.  Last received IV Feraheme 8/2023. She had body aches with this and would benefit from pre-med Benadryl/Tylenol with future doses.      She is up to date on GI screenings, most recent colonoscopy with last 4/2021.  She has iron deficiency now.  History of gastric bypass.  Injectafer x2.      Follow-up with Dr. Montero in 3 months with labs prior    20 minutes spent on the date of the encounter doing chart review, review of test results, interpretation of tests, patient visit and documentation     Haydee Krishnamurthy PA-C  [facility]   [phone]

## 2023-11-10 NOTE — PROGRESS NOTES
"Oncology Rooming Note    November 10, 2023 1:07 PM   Lynn Thompson is a 58 year old female who presents for:    Chief Complaint   Patient presents with    Oncology Clinic Visit     Initial Vitals: BP (!) 151/83   Pulse 58   Resp 16   Wt 61.4 kg (135 lb 6.4 oz)   LMP 12/19/2013   SpO2 99%   BMI 23.24 kg/m   Estimated body mass index is 23.24 kg/m  as calculated from the following:    Height as of 7/1/23: 1.626 m (5' 4\").    Weight as of this encounter: 61.4 kg (135 lb 6.4 oz). Body surface area is 1.67 meters squared.  No Pain (0) Comment: Data Unavailable   Patient's last menstrual period was 12/19/2013.  Allergies reviewed: Yes  Medications reviewed: Yes    Medications: Medication refills not needed today.  Pharmacy name entered into [employer]:    [id number] - [place], [place] - [address] [facility] - [place], [place] - [address]  [id number] - [place], [place] - [address]    Clinical concerns:   PA was notified.      Caridad Sinclair CMA            "

## 2023-11-13 NOTE — LETTER
11/13/2023        RE: Lynn Thompson  [address] [address] [address]  Appeal:  Viokace 09814 coverage; reference # [id number]        To Whom It May Concern.    Please accept this letter stating medical necessity for Viokace 21883 for Lynn Thompson.    Lynn is a complex patient with a history of gastric bypass surgery, subsequent pancreatitis, and total pancreatectomy with islet autotransplant.  Because of total pancreatectomy she requires pancreatic enzyme therapy for nutrition and micronutrient absorption.    The preferred formulary brands are Creon and ZenPep.    Lynn has been on Viokace continually since December 2014 for the following reasons:  1)  She tried and failed Creon.  She had persistent steatorrhea, frequent stools and weight loss on Creon.  This did not respond to other treatments including treatment for small bacterial overgrowth.    2)  Because of her gastric bypass surgery, it was felt by her treating surgeon, Dr. Aaron Early, that the enteric coated formulation of Creon was not working as the enteric coating release is designed for an intact stomach and GI tract.  With rapid gastric emptying post-gastric bypass the enteric coated formulations may not release active enzyme until they are too distal in the intestine to be effective.  3)  For this reason, Viokace 37794 was recommended at a similar dose.  Viokace is not enteric coated and therefore has a release of active enzyme more proximally in the gut and is often more suitable for patients with gastric bypass physiology.  4)  Lynn had resolution of steatorrhea and had a daily formed stool following change to Viokace and has successfully maintained on this drug for 10 years.  5)  A trial of ZenPep is not medically appropriate for this patient.  ZenPep, like Creon, is an enteric coated formulation and will have similar issues to Creon, as described under item #1.  The only non-enteric coated formulation on the market in  the [place] is Viokace.      Thank you for your review of this individual's unique medical needs.    Sincerely,      Dr. Adriana Robles MD  [facility] [place]  Director of Research, Islet Auto-transplant Program  Phone:  [phone]  Electronically signed: November 13, 2023 at 9:12 AM            Sincerely,        Adriana Robles MD

## 2023-11-22 ENCOUNTER — IMMUNIZATION (OUTPATIENT)
Dept: FAMILY MEDICINE | Facility: CLINIC | Age: 58
End: 2023-11-22
Payer: COMMERCIAL

## 2023-11-22 DIAGNOSIS — Z90.410 POST-PANCREATECTOMY DIABETES (H): ICD-10-CM

## 2023-11-22 DIAGNOSIS — E13.9 POST-PANCREATECTOMY DIABETES (H): ICD-10-CM

## 2023-11-22 DIAGNOSIS — Z23 NEED FOR VACCINATION: Primary | ICD-10-CM

## 2023-11-22 DIAGNOSIS — E89.1 POST-PANCREATECTOMY DIABETES (H): ICD-10-CM

## 2023-11-22 PROCEDURE — 90662 IIV NO PRSV INCREASED AG IM: CPT

## 2023-11-22 PROCEDURE — 99207 PR NO CHARGE NURSE ONLY: CPT

## 2023-11-22 PROCEDURE — 90471 IMMUNIZATION ADMIN: CPT

## 2023-11-22 RX ORDER — PROCHLORPERAZINE 25 MG/1
SUPPOSITORY RECTAL
Qty: 3 EACH | Refills: 5 | Status: SHIPPED | OUTPATIENT
Start: 2023-11-22 | End: 2024-05-16

## 2023-11-22 RX ORDER — PROCHLORPERAZINE 25 MG/1
SUPPOSITORY RECTAL
Qty: 1 EACH | Refills: 1 | Status: ON HOLD | OUTPATIENT
Start: 2023-11-22 | End: 2024-08-23

## 2023-11-29 DIAGNOSIS — R82.991 HYPOCITRATURIA: ICD-10-CM

## 2023-11-29 DIAGNOSIS — N20.0 NEPHROLITHIASIS: ICD-10-CM

## 2023-11-30 ENCOUNTER — TELEPHONE (OUTPATIENT)
Dept: TRANSPLANT | Facility: CLINIC | Age: 58
End: 2023-11-30
Payer: COMMERCIAL

## 2023-11-30 NOTE — TELEPHONE ENCOUNTER
Patient Call: General  Route to LPN    Reason for call: patient called about having some medication refill issues and has a PA denial. Patient is needing to refill for pancreatic enzymes and have not heard anything. They are needing assistance with getting refill of medication.       Call back needed? Yes    Return Call Needed  Same as documented in contacts section  When to return call?: Same day: Route High Priority

## 2023-12-01 ENCOUNTER — IMMUNIZATION (OUTPATIENT)
Dept: FAMILY MEDICINE | Facility: CLINIC | Age: 58
End: 2023-12-01
Payer: COMMERCIAL

## 2023-12-01 DIAGNOSIS — Z23 ENCOUNTER FOR IMMUNIZATION: Primary | ICD-10-CM

## 2023-12-01 PROCEDURE — 91320 SARSCV2 VAC 30MCG TRS-SUC IM: CPT

## 2023-12-01 PROCEDURE — 99207 PR NO CHARGE NURSE ONLY: CPT

## 2023-12-01 PROCEDURE — 90480 ADMN SARSCOV2 VAC 1/ONLY CMP: CPT

## 2023-12-01 NOTE — TELEPHONE ENCOUNTER
Potassium Citrate ER Oral Tablet Extended Release 10 MEQ (1080 MG)       Take 1 tablet (10 mEq) by mouth 3 times daily (with meals)  Last Written Prescription Date:  5-2-23  Last Fill Quantity: 60,   # refills: 11  Last Office Visit : 5-2-23  Future Office visit:  2-27-24      Last clinic note:Increase calcium citrate to 2 pills twice a day with food.    Routing refill request to provider for review/approval because:  Requested/current rx dose not match clinic note

## 2023-12-01 NOTE — PROGRESS NOTES
Prior to immunization administration, verified patients identity using patient s name and date of birth. Please see Immunization Activity for additional information.     Screening Questionnaire for Adult Immunization    Are you sick today?   No   Do you have allergies to medications, food, a vaccine component or latex?   No   Have you ever had a serious reaction after receiving a vaccination?   No   Do you have a long-term health problem with heart, lung, kidney, or metabolic disease (e.g., diabetes), asthma, a blood disorder, no spleen, complement component deficiency, a cochlear implant, or a spinal fluid leak?  Are you on long-term aspirin therapy?   Yes   Do you have cancer, leukemia, HIV/AIDS, or any other immune system problem?   No   Do you have a parent, brother, or sister with an immune system problem?   No   In the past 3 months, have you taken medications that affect  your immune system, such as prednisone, other steroids, or anticancer drugs; drugs for the treatment of rheumatoid arthritis, Crohn s disease, or psoriasis; or have you had radiation treatments?   No   Have you had a seizure, or a brain or other nervous system problem?   No   During the past year, have you received a transfusion of blood or blood    products, or been given immune (gamma) globulin or antiviral drug?   No   For women: Are you pregnant or is there a chance you could become       pregnant during the next month?   No   Have you received any vaccinations in the past 4 weeks?   No     Immunization questionnaire answers    I have reviewed the following standing orders:   This patient is due and qualifies for the Covid-19 vaccine.     Click here for COVID-19 Standing Order    I have reviewed the vaccines inclusion and exclusion criteria; No concerns regarding eligibility.     Patient instructed to remain in clinic for 15 minutes afterwards, and to report any adverse reactions.     Screening performed by Tavia Saunders MA on  12/1/2023 at 9:58 AM.

## 2023-12-04 DIAGNOSIS — K86.89 PANCREATIC INSUFFICIENCY: ICD-10-CM

## 2023-12-04 RX ORDER — POTASSIUM CITRATE 10 MEQ/1
10 TABLET, EXTENDED RELEASE ORAL
Qty: 270 TABLET | Refills: 1 | Status: SHIPPED | OUTPATIENT
Start: 2023-12-04 | End: 2024-03-04

## 2023-12-04 NOTE — TELEPHONE ENCOUNTER
Patient called to touch base with a supervisor. Patient has been calling and Emailing the [facility] regarding a refill on a medication and patient has not gotten a return call.

## 2023-12-04 NOTE — TELEPHONE ENCOUNTER
"Returned call to patient with updates. New prescription for Viokace has been sent to her local pharmacy; prior authorization will likely be required again. Updated patient that I would also reach to pharmacy/PA staff and request that PA information be sent to me directly to verify that PA is correctly and thoroughly filled out.     Patient reports that she is struggling with incontinence and loose stools (having to wear a \"diaper\" and rarely leaving home), undigested food and intact pills are noted in her stool. None of these issues were happening while she was utilizing Viokace as her PERT option.     Coordinator will contact patient 12/5/23 with updates.     Erlinda Multani RN Transplant Coordinator on December 4, 2023 5:01 PM     "

## 2023-12-05 NOTE — LETTER
Lynn Thompson  [address] [facility] [id number]  : 1965    I am writing on behalf of my patient Lynn Thompson, Member ID: [id number], to request that you consider reviewing your recent denial of coverage of her Viokace [id number] pancreatic enzyme replacement therapy based on her specific clinical case and health history.      Lynn underwent Total Pancreatectomy-Islet Auto Transplant surgery at the [facility] on 5/10/2013. Of note, [facility]  is the world leader in Total Pancreatectomy-Islet Auto Transplant, having created the procedure and completed over 800 procedures. As part of the surgery, the patient's entire pancreas was removed leaving her completely dependent on pancreatic enzyme replacement therapy for exocrine pancreatic function. Additionally, patient's who have undergone Total Pancreatectomy-Islet Auto Transplant have a Quinn-en-y reanastomosis of their GI tract; Ms. Thompson had previously had gastric bypass surgery so she had this anatomical status already. As such, post-Total Pancreatectomy-Islet Auto Transplant patients are at increased risk for malabsorption of nutrients specifically iron and fat-soluble vitamins.      Ms. Thompson, as you will have seen based on your review of her formulary exception forms has been utilizing Viokace as her PERT option since  with positive outcomes (please see included fat-soluble vitamin levels and nutritional lab values drawn while on Viokace therapy). As documented, she did try Creon alone with increased symptoms of exocrine pancreatic insufficiency (EPI) and decreases in nutritional lab values. Additionally, after her Viokace prescription was denied by [employer] in 2023, she was required to utilize Zenpep as her PERT option. Since starting Zenpep, she has had extreme symptoms of EPI including but not limited to increased  loose stools with occasional incontinence requiring use of adult incontinence undergarments, increased abominal pain, greasy, foul-smelling stools, and noting undigested food and medications in her stool. Due to her symptoms, she is essentially homebound at this time as well. Viokace is not in a coated form and has, for the past decade, effectively managed the patient's nutrient absorption and exocrine pancreatic insufficiency.      Thank you for your time and the consideration of this special case. If you need any additional information or documentation, please feel free to call my RN care coordinator, Erlinda Multani, at [phone] with any questions.      Regards,     Dr. Adriana Robles MD  [facility]  Director of Research, Islet Auto-transplant Program

## 2023-12-05 NOTE — PROGRESS NOTES
Submitted prior authorization request for patient's Viokace prescription using CoverMyMeds at 11:09 AM. Included letter clearly documenting trial and failure of both Creon and Zenpep as required by [employer]; included all clinicals from Dr Robles as well as patient's normal BOOST/MMT results from September 2023 while she was using Viokace.     Per [employer] website, response may take 24-72 hours. Coordinator will continue to monitor.     Erlinda Multani RN Transplant Coordinator on December 5, 2023 11:10 AM

## 2023-12-07 ENCOUNTER — TRANSFERRED RECORDS (OUTPATIENT)
Dept: HEALTH INFORMATION MANAGEMENT | Facility: CLINIC | Age: 58
End: 2023-12-07
Payer: COMMERCIAL

## 2023-12-07 DIAGNOSIS — E13.9 POST-PANCREATECTOMY DIABETES (H): ICD-10-CM

## 2023-12-07 DIAGNOSIS — Z90.410 POST-PANCREATECTOMY DIABETES (H): ICD-10-CM

## 2023-12-07 DIAGNOSIS — E89.1 POST-PANCREATECTOMY DIABETES (H): ICD-10-CM

## 2023-12-08 RX ORDER — INSULIN ASPART INJECTION 100 [IU]/ML
INJECTION, SOLUTION SUBCUTANEOUS
Qty: 90 ML | Refills: 0 | Status: ON HOLD | OUTPATIENT
Start: 2023-12-08 | End: 2024-08-23

## 2023-12-09 DIAGNOSIS — E89.1 POST-PANCREATECTOMY DIABETES (H): ICD-10-CM

## 2023-12-09 DIAGNOSIS — Z90.410 POST-PANCREATECTOMY DIABETES (H): ICD-10-CM

## 2023-12-09 DIAGNOSIS — E13.9 POST-PANCREATECTOMY DIABETES (H): ICD-10-CM

## 2023-12-11 RX ORDER — INSULIN ASPART INJECTION 100 [IU]/ML
INJECTION, SOLUTION SUBCUTANEOUS
Qty: 90 ML | Refills: 0 | OUTPATIENT
Start: 2023-12-11

## 2023-12-21 ENCOUNTER — TRANSFERRED RECORDS (OUTPATIENT)
Dept: HEALTH INFORMATION MANAGEMENT | Facility: CLINIC | Age: 58
End: 2023-12-21
Payer: COMMERCIAL

## 2023-12-29 ENCOUNTER — TELEPHONE (OUTPATIENT)
Dept: TRANSPLANT | Facility: CLINIC | Age: 58
End: 2023-12-29
Payer: COMMERCIAL

## 2023-12-29 NOTE — TELEPHONE ENCOUNTER
Spoke with Lynn today to see if symptoms had improved since restarting Viokace. She reports that incontinence has resolved and symptoms of EPI have resolved as well. Feeling much better overall and able to leave home and function more normally.     Patient is scheduled to see Dr. Robles next week in clinic; up to date on all labs except Hgb A1C.     Erlinda Multani RN Transplant Coordinator on December 29, 2023 11:43 AM

## 2024-01-02 ENCOUNTER — OFFICE VISIT (OUTPATIENT)
Dept: URGENT CARE | Facility: URGENT CARE | Age: 59
End: 2024-01-02
Payer: COMMERCIAL

## 2024-01-02 VITALS
WEIGHT: 132 LBS | TEMPERATURE: 100.1 F | DIASTOLIC BLOOD PRESSURE: 80 MMHG | SYSTOLIC BLOOD PRESSURE: 134 MMHG | OXYGEN SATURATION: 98 % | BODY MASS INDEX: 22.66 KG/M2 | RESPIRATION RATE: 14 BRPM | HEART RATE: 82 BPM

## 2024-01-02 DIAGNOSIS — J01.40 ACUTE NON-RECURRENT PANSINUSITIS: Primary | ICD-10-CM

## 2024-01-02 PROCEDURE — 99213 OFFICE O/P EST LOW 20 MIN: CPT | Performed by: PHYSICIAN ASSISTANT

## 2024-01-02 NOTE — PROGRESS NOTES
Assessment/Plan:    Given sinus tenderness and fever, suspect acute bacterial sinusitis; will treat with antibiotics. Also recommended Mucinex.    See patient instructions below.  At the end of the encounter, I discussed results, diagnosis, medications. Discussed red flags for immediate return to clinic/ER, as well as indications for follow up if no improvement. Patient understood and agreed to plan. Patient was stable for discharge.      ICD-10-CM    1. Acute non-recurrent pansinusitis  J01.40 amoxicillin-clavulanate (AUGMENTIN) 875-125 MG tablet            Return in about 3 days (around 1/5/2024) for Follow up w/ primary care provider if not better.    ROSHNI Corrigan, PA-[facility]    ------------------------------------------------------------------------------------------------------------------------------------------------------------------------  HPI:  Lynn Thompson is a 58 year old female who presents for evaluation of nasal congestion, frontal HA, sinus pressure, and fever onset 4 days ago. No treatments tried. Patient reports no f cough, sore throat, loss of sense of taste or smell, headache, chest pain, shortness of breath, abdominal pain, nausea, vomiting, diarrhea, rash, or any other symptoms. No known sick contacts/COVID exposure. She had a negative home COVID test.      Past Medical History:   Diagnosis Date    Benign paroxysmal positional vertigo     occ.     Calculus of kidney 05/2005    x1 on L side passed, several stones.  Has been tested for oxalate.    Chronic abdominal pain 07/17/2013    Chronic pancreatitis 07/17/2013    Depression     also occ panic spells    Diabetes (H) 5/10/2013    Total Pancreatomy with Auto Islets Transplant    Dyspepsia 06/1999    H. 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   HENT:      Right Ear: Tympanic membrane normal.      Left Ear: Tympanic membrane normal.      Nose: Mucosal edema present.      Right Sinus: Maxillary sinus tenderness and frontal sinus tenderness present.      Left Sinus: Maxillary sinus tenderness and frontal sinus tenderness present.      Mouth/Throat:      Mouth: Mucous membranes are moist.      Pharynx: Oropharynx is clear.   Cardiovascular:      Rate and Rhythm: Normal rate and regular rhythm.      Heart sounds: Normal heart sounds.   Pulmonary:      Effort: Pulmonary effort is normal.      Breath sounds: Normal breath sounds.   Neurological:      Mental Status: She is alert.         Labs/Imaging:  No results found. However, due to the size of the patient record, not all encounters were searched. Please check Results Review for a complete set of results.  No results found for this or any previous visit (from the past 24 hour(s)).      Patient Instructions   1.  Take antibiotic according to instructions, with food to prevent stomach upset. If you are prone to stomach upset with antibiotics, I recommend adding a probiotic to this regimen.  Culturelle is a trusted brand.  2.  I recommend using Mucinex to help thin mucus secretions.  Adding a nasal steroid spray such as Flonase can also be helpful with clearing sinus congestion.  3.  Take Tylenol 650mg every 4 hours or ibuprofen 600mg every 6 hours by mouth for pain/fever.  Do not exceed 4000mg of acetaminophen or 2400mg  of ibuprofen from any source in a 24 hour period.  Taking Tylenol and ibuprofen together may be helpful in reducing pain.   4.  Follow-up if you not having any improvement in your symptoms over the next 5 days.    Sinusitis  The sinuses are air-filled spaces within the bones of the face. They connect to the inside of the nose. Sinusitis is an inflammation of the tissue that lines the sinuses. Sinusitis can occur during a cold. It can also happen due to allergies to pollens and other particles in the air. Sinusitis can cause symptoms of sinus congestion and a feeling of fullness. A sinus infection causes fever, headache, and facial pain. There is often green or yellow fluid draining from the nose or into the back of the throat (post-nasal drip). You have been given antibiotics to treat this condition.  Home care  Take the full course of antibiotics as instructed. Do not stop taking them, even when you feel better.  Drink plenty of water, hot tea, and other liquids. This may help thin nasal mucus. It also may help your sinuses drain fluids.  Heat may help soothe painful areas of your face. Use a towel soaked in hot water. Or,  the shower and direct the warm spray onto your face. Using a vaporizer along with a menthol rub at night may also help soothe symptoms.   An expectorant with guaifenesin may help thin nasal mucus and help your sinuses drain fluids.  You can use an over-the-counter decongestant, unless a similar medicine was prescribed to you. Nasal sprays work the fastest. Use one that contains phenylephrine or oxymetazoline. First blow your nose gently. Then use the spray. Do not use these medicines more often than directed on the label. If you do, your symptoms may get worse. You may also take pills that contain pseudoephedrine. Don t use products that combine multiple medicines. This is because side effects may be increased. Read labels. You can also ask the pharmacist for help. (People with high  blood pressure should not use decongestants. They can raise blood pressure.)  Over-the-counter antihistamines may help if allergies contributed to your sinusitis.    Do not use nasal rinses or irrigation during an acute sinus infection, unless your healthcare provider tells you to. Rinsing may spread the infection to other areas in your sinuses.  Use acetaminophen or ibuprofen to control pain, unless another pain medicine was prescribed to you. If you have chronic liver or kidney disease or ever had a stomach ulcer, talk with your healthcare provider before using these medicines. (Aspirin should never be taken by anyone under age 18 who is ill with a fever. It may cause severe liver damage.)  Don't smoke. This can make symptoms worse.  Follow-up care  Follow up with your healthcare provider or our staff if you are better in 1 week.  When to seek medical advice  Call your healthcare provider if any of these occur:  Facial pain or headache that gets worse  Stiff neck  Unusual drowsiness or confusion  Swelling of your forehead or eyelids  Vision problems, such as blurred or double vision  Fever of 100.4 F (38 C) or higher, or as directed by your healthcare provider  Seizure  Breathing problems  Symptoms don't go away in 10 days  Prevention  Here are steps you can take to help prevent an infection:  Keep good hand washing habits.  Don t have close contact with people who have sore throats, colds, or other upper respiratory infections.  Don t smoke, and stay away from secondhand smoke.  Stay up to date with of your vaccines.

## 2024-01-02 NOTE — PATIENT INSTRUCTIONS
1.  Take antibiotic according to instructions, with food to prevent stomach upset. If you are prone to stomach upset with antibiotics, I recommend adding a probiotic to this regimen.  Culturelle is a trusted brand.  2.  I recommend using Mucinex to help thin mucus secretions.  Adding a nasal steroid spray such as Flonase can also be helpful with clearing sinus congestion.  3.  Take Tylenol 650mg every 4 hours or ibuprofen 600mg every 6 hours by mouth for pain/fever.  Do not exceed 4000mg of acetaminophen or 2400mg of ibuprofen from any source in a 24 hour period.  Taking Tylenol and ibuprofen together may be helpful in reducing pain.   4.  Follow-up if you not having any improvement in your symptoms over the next 5 days.    Sinusitis  The sinuses are air-filled spaces within the bones of the face. They connect to the inside of the nose. Sinusitis is an inflammation of the tissue that lines the sinuses. Sinusitis can occur during a cold. It can also happen due to allergies to pollens and other particles in the air. Sinusitis can cause symptoms of sinus congestion and a feeling of fullness. A sinus infection causes fever, headache, and facial pain. There is often green or yellow fluid draining from the nose or into the back of the throat (post-nasal drip). You have been given antibiotics to treat this condition.  Home care  Take the full course of antibiotics as instructed. Do not stop taking them, even when you feel better.  Drink plenty of water, hot tea, and other liquids. This may help thin nasal mucus. It also may help your sinuses drain fluids.  Heat may help soothe painful areas of your face. Use a towel soaked in hot water. Or,  the shower and direct the warm spray onto your face. Using a vaporizer along with a menthol rub at night may also help soothe symptoms.   An expectorant with guaifenesin may help thin nasal mucus and help your sinuses drain fluids.  You can use an over-the-counter decongestant,  unless a similar medicine was prescribed to you. Nasal sprays work the fastest. Use one that contains phenylephrine or oxymetazoline. First blow your nose gently. Then use the spray. Do not use these medicines more often than directed on the label. If you do, your symptoms may get worse. You may also take pills that contain pseudoephedrine. Don t use products that combine multiple medicines. This is because side effects may be increased. Read labels. You can also ask the pharmacist for help. (People with high blood pressure should not use decongestants. They can raise blood pressure.)  Over-the-counter antihistamines may help if allergies contributed to your sinusitis.    Do not use nasal rinses or irrigation during an acute sinus infection, unless your healthcare provider tells you to. Rinsing may spread the infection to other areas in your sinuses.  Use acetaminophen or ibuprofen to control pain, unless another pain medicine was prescribed to you. If you have chronic liver or kidney disease or ever had a stomach ulcer, talk with your healthcare provider before using these medicines. (Aspirin should never be taken by anyone under age 18 who is ill with a fever. It may cause severe liver damage.)  Don't smoke. This can make symptoms worse.  Follow-up care  Follow up with your healthcare provider or our staff if you are better in 1 week.  When to seek medical advice  Call your healthcare provider if any of these occur:  Facial pain or headache that gets worse  Stiff neck  Unusual drowsiness or confusion  Swelling of your forehead or eyelids  Vision problems, such as blurred or double vision  Fever of 100.4 F (38 C) or higher, or as directed by your healthcare provider  Seizure  Breathing problems  Symptoms don't go away in 10 days  Prevention  Here are steps you can take to help prevent an infection:  Keep good hand washing habits.  Don t have close contact with people who have sore throats, colds, or other upper  respiratory infections.  Don t smoke, and stay away from secondhand smoke.  Stay up to date with of your vaccines.

## 2024-01-04 ENCOUNTER — VIRTUAL VISIT (OUTPATIENT)
Dept: TRANSPLANT | Facility: CLINIC | Age: 59
End: 2024-01-04
Attending: PEDIATRICS

## 2024-01-04 ENCOUNTER — HOSPITAL ENCOUNTER (EMERGENCY)
Facility: CLINIC | Age: 59
Discharge: HOME | End: 2024-01-04
Attending: EMERGENCY MEDICINE | Admitting: EMERGENCY MEDICINE
Payer: COMMERCIAL

## 2024-01-04 VITALS
SYSTOLIC BLOOD PRESSURE: 131 MMHG | RESPIRATION RATE: 20 BRPM | HEART RATE: 77 BPM | TEMPERATURE: 100.2 F | OXYGEN SATURATION: 95 % | DIASTOLIC BLOOD PRESSURE: 51 MMHG

## 2024-01-04 DIAGNOSIS — Z90.410 POST-PANCREATECTOMY DIABETES (H): Primary | ICD-10-CM

## 2024-01-04 DIAGNOSIS — E89.1 POST-PANCREATECTOMY DIABETES (H): Primary | ICD-10-CM

## 2024-01-04 DIAGNOSIS — E13.9 POST-PANCREATECTOMY DIABETES (H): Primary | ICD-10-CM

## 2024-01-04 DIAGNOSIS — J10.1 INFLUENZA A: ICD-10-CM

## 2024-01-04 LAB
FLUAV RNA SPEC QL NAA+PROBE: POSITIVE
FLUBV RNA RESP QL NAA+PROBE: NEGATIVE
GROUP A STREP BY PCR: NOT DETECTED
RSV RNA SPEC NAA+PROBE: NEGATIVE
SARS-COV-2 RNA RESP QL NAA+PROBE: NEGATIVE

## 2024-01-04 PROCEDURE — 87651 STREP A DNA AMP PROBE: CPT | Performed by: EMERGENCY MEDICINE

## 2024-01-04 PROCEDURE — 99215 OFFICE O/P EST HI 40 MIN: CPT | Mod: 95 | Performed by: PEDIATRICS

## 2024-01-04 PROCEDURE — 87637 SARSCOV2&INF A&B&RSV AMP PRB: CPT | Performed by: EMERGENCY MEDICINE

## 2024-01-04 PROCEDURE — 99283 EMERGENCY DEPT VISIT LOW MDM: CPT

## 2024-01-04 PROCEDURE — 250N000013 HC RX MED GY IP 250 OP 250 PS 637: Performed by: EMERGENCY MEDICINE

## 2024-01-04 RX ORDER — ACETAMINOPHEN 500 MG
1000 TABLET ORAL ONCE
Status: COMPLETED | OUTPATIENT
Start: 2024-01-04 | End: 2024-01-04

## 2024-01-04 RX ADMIN — ACETAMINOPHEN 1000 MG: 500 TABLET, FILM COATED ORAL at 01:19

## 2024-01-04 ASSESSMENT — ACTIVITIES OF DAILY LIVING (ADL): ADLS_ACUITY_SCORE: 37

## 2024-01-04 NOTE — ED PROVIDER NOTES
History     Chief Complaint:  Flu Symptoms    The history is provided by the patient.      Lynn Thompson is a 58 year old female with a history of hypertension and diabetes mellitus presenting with flu symptoms. Patient endorses being sick the past couple days. She went to urgent care on Tuesday (1/2/24) for her headache. They believed she had a sinus infection and she was prescribed Augmentin. She has been taking aleve for her symptoms. She endorses sore throat, ear pain, and dental pain. She denies vomiting. No breathing problems at baseline. Of note, patient has a history of splenectomy.    Independent Historian:    at bedside and provided partial history.     Medications:    Duloxetine   Escitalopram   Estradiol   Famotidine   Gabapentin  Hydroxyzine   Insulin   Levothyroxine   Loratadine   Omeprazole   Tramadol  Trazodone     Past Medical History:    Benign paroxysmal positional vertigo  Calculus of kidney  Chronic abdominal pain  Chronic pancreatitis  Depression  Diabetes   Dyspepsia  Dysthymia  Hypertension  Anemia  Diabetes mellitus   Sleep apnea  Spasm of sphincter of Oddi  Thyroid nodule    Past Surgical History:    Abdominoplasty   Appendectomy  Bunionectomy   CBD stent placement   C section   Cholecystectomy   Colonoscopy   Cystoscopy   Ureteroscopy   Lithotripsy   Insert stent   Cystotomy   EGD  Hernia repair   ALLY BSO  I&D hand   Lysis   Pancreatectomy   Partial ileum resection   Rectopexy   Repair ptosis brow bilateral   Sacrocolpopexy   Sigmoidoscopy   Tonsillectomy   Adenoidectomy   Pancreatic transplant     Physical Exam   Patient Vitals for the past 24 hrs:   BP Temp Temp src Pulse Resp SpO2   01/04/24 0114 138/64 100.2  F (37.9  C) Oral 87 20 97 %      Physical Exam  General: Patient is awake, alert  Head: The scalp, face, and head appear normal  Eyes: The pupils are equal, round, and reactive to light. Conjunctivae and sclerae are normal  ENT: External acoustic canals are normal.  The oropharynx is normal without erythema. Uvula is in the midline.  Posterior oropharynx is clear.  Clear TMs bilaterally.  Neck: Normal range of motion.   CV: Regular rate and rhythm.   Resp: Lungs are clear without wheezes or rales. No respiratory distress.   GI: Abdomen is soft, no rigidity, guarding, or rebound. No distension. No tenderness to palpation in any quadrant.    MS: Normal tone.   Skin: No rash or lesions noted. Normal capillary refill noted  Neuro: Speech is normal and fluent. Face is symmetric. Moving all extremities.   Psych:  Normal affect.  Appropriate interactions.    Emergency Department Course     Laboratory:  Labs Ordered and Resulted from Time of ED Arrival to Time of ED Departure   INFLUENZA A/B, RSV, & SARS-COV2 PCR - Abnormal       Result Value    Influenza A PCR Positive (*)     Influenza B PCR Negative      RSV PCR Negative      SARS CoV2 PCR Negative     GROUP A STREPTOCOCCUS PCR THROAT SWAB - Normal    Group A strep by PCR Not Detected       Emergency Department Course & Assessments:    Interventions:  Medications   acetaminophen (TYLENOL) tablet 1,000 mg (1,000 mg Oral $Given 1/4/24 0119)      Independent Interpretation (X-rays, CTs, rhythm strip):  No imaging completed    Assessments/Consultations/Discussion of Management or Tests:  ED Course as of 01/04/24 0245   Thu Jan 04, 2024   0236 I obtained the history and examined the patient as noted above.        Social Determinants of Health affecting care:   None    Disposition:  The patient was discharged to home.     Impression & Plan      Medical Decision Making:  Lynn Thompson is a 58 year old female who presents for evaluation of cough, fever and myalgias.  They have other symptoms including sore throat, ear pain and dental pain.   Patient tested positive for influenza which corresponds with her symptomatology.  Patient is currently getting treated for sinus infection with Augmentin.  I recommended that she continue her  antibiotics until completion.  They are at risk for pneumonia but no signs of this are detected on today's visit.  Close followup of primary care physician is indicated and return to the ED for high fevers > 103 for more than 48 hours more, increasing productive cough, shortness of breath, or confusion.  There is no signs of serious bacterial infection such as bacteremia, meningitis, UTI/pyelonephritis, strep pharyngitis, etc.       Diagnosis:    ICD-10-CM    1. Influenza A  J10.1          Scribe Disclosure:  Sabrina SALDANA, am serving as a scribe at 2:44 AM on 1/4/2024 to document services personally performed by Farhad Hoffmann MD based on my observations and the provider's statements to me.   1/4/2024   Farhad Hoffmann MD Battista, Christopher Joseph, MD  01/04/24 0627

## 2024-01-04 NOTE — PROGRESS NOTES
Lynn is a 58 year old who is being evaluated via a billable video visit.          Video-Visit Details    Type of service:  Video Visit   Video Start Time:  2:30  Video End Time: 2:46    Originating Location (pt. Location): Home    Distant Location (provider location):  On-site  Platform used for Video Visit: [facility]  Islet Autotransplant, Diabetes Follow Up    Problem List:  Patient Active Problem List   Diagnosis    Iron deficiency anemia secondary to inadequate dietary iron intake    Gastric bypass status for obesity    Health Care Home    Chronic pain syndrome    Exocrine pancreatic insufficiency    Asplenia    BLU (obstructive sleep apnea)    H/O of rectopexy    Dysthymia    Anxiety    Thyroid nodule    Acquired hypothyroidism    Post-pancreatectomy diabetes (H)    Other iron deficiency anemia    Uterovaginal prolapse, unspecified    Rectal prolapse    Small bowel obstruction (H)    Urge incontinence    Urinary urgency    High-tone pelvic floor dysfunction    Pyuria    Recurrent kidney stones    History of uterine prolapse    Vaginal atrophy    Enteric hyperoxaluria    Hypocitraturia    Acute pyelonephritis    Acute sepsis (H)       HPI:  Lynn is a 58 year old female here for follow up o total pancreatectomy, islet cell autotransplant, splenectomy, liver biopsy (needle core), choledochoduodenostomy, feeding jejunostomy performed on 5/10/2013.  At the time of the procedure, the patient received 178,100 IEQ, or 3,209 IEQ/kg body weight. She has had two subsequent exploratory laparatomy for small bowel obstruction. Other notable endocrine history includes a complex cystic nodule in mid right thyroid lobe with 1 cm maximum diameter (saw Dr Adorno in 11/2016) and mild hypothyroidism followed by PCP, pathology in 2018 by FNA showed a benign nodule (includes adenomatoid nodule, colloid nodule, etc.).  She had an episode of cholangitis and was hospitalized for 4  days 8/24/17 (fevers, RUQ pain, + Ecoli blood cx).    She was on NO insulin at her 1 year, 2 year, 3 year, 4 year transplant anniversaries and restart insulin just after 4 years post-tx, insulin restart date of  6/6/2017.   She had a slow opioid wean off suboxone but eventually did come off.     Other history notable for surgical procedure Admitted from 2/1- 2/5/22 for  1. Posterior suture rectopexy (Colorectal primary)  2. Sigmoidoscopy (Colorectal primary)  3. Supracervical hysterectomy with bilateral salpingooophrectomy (Uro)  4. Uterosacral ligament suspension (Uro)  5. Gan colposuspension (Uro)  6. Cystoscopy (Uro)        1)  Diabetes:  Lynn continues to have partial islet graft function.   She is on Fiasp right before meal time and has rapid gastric emptying.  Recent complications affecting DM:  Off viokace due to insurance (see below); then developed influenza this week, now recovering.  Also says she has not been regular about pre-meal bolusing and will sometimes bolus once she starts eating.  In context of all this we are seeing quite a few more post prandial hyperglycemia episodes today.     Continues on Omnipod 5 and is very happy with this.   Nighttime have been very stable.  Seeing that she is having more prandial higs today, but she also notes taking some delayed bolus doses, and then these intercurrent health issues of running out of enzymes and Influnenza A today are complicating this.   No pump or CGM issues.  Main issue of note is that she is still having post prandial highs, improved somewhat with dose change to I:C last visit.     Pump, settings below.    Max Basal 1.0 unit(s)/hr     Basal Rates:  12a 0.15 unit(s)/hr  8a 0.25 units/hr      Target Glucose  12a-8a 130 Correct above 140  8a-12 a 120 Correct above 130     Sensitivity   12a -8a 170  8am-12a 140     Insulin to Carb Ratio  12 am 15 grams     Max Bolus 6  Active insulin time 3 hours  Reverse correction off        Total daily  insulin dose= 10 units of Fiasp, of which 4.4 units per day is basal on pump, 5.6 units is bolus.          HbA1c levels:  Lab Results   Component Value Date    A1C 7.1 09/07/2023    A1C 6.6 04/03/2023    A1C 6.5 01/19/2023    A1C 7.7 09/15/2022    A1C 8.2 05/08/2022    A1C 6.8 05/10/2021    A1C 6.9 02/16/2021    A1C 6.7 08/20/2020    A1C 7.1 06/05/2020    A1C 6.9 02/06/2020           Hypoglycemia history:   Recent history as above.  The patient has had NO episodes of severe hypoglycemia (seizure, coma, or neuroglycopenic symptoms severe enough to require assistance from another person).  Blood sugars were reviewed from the patient records and/or the meter download.        Patient is good candidate for CGM therapy.  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   --  Require frequent adjustments of the insulin treatment regimen, based on therapeutic CGM test results      2)  Nutrition./PERT:  Complicated by denial of Viokace, now back on this.  Due to insurance had to switch to ZenPep for a short time.  We already knew ZenPep and Creon did not work for her as enteric coated enzymes but unfortunately this did not get approved by insurance.  When on ZenPep, she had malabsorption to the point of needing to wear a diaper, bloating, felt awful.  Now on Viokace again, stools are formed, bloating improved, not 100% back to baseline but much better.     Regaining weight, up to 135 pounds per her report.     Last nutritional studies:  Component      Latest Ref Rng & Units 9/6/2022 9/15/2022   Iron      37 - 145 ug/dL 69    Iron Sat  Index      15 - 46 % 27    Iron Binding Capacity      240 - 430 ug/dL 255    Vitamin A      0.30 - 1.20 mg/L  0.49   Retinol Palmitate      0.00 - 0.10 mg/L  <0.02   Vitamin A Interp        Normal   25 OH Vit D2      ug/L  <5   25 OH Vit D3      ug/L  58   25 OH Vit D total      20 - 75 ug/L  <63   Vitamin E      5.5 - 18.0 mg/L  10.3   Vitamin E Gamma      0.0 - 6.0 mg/L  0.9   Vitamin B12      232 - 1,245 pg/mL  418       3)  Thyroid:  On levothyroxine 150 mcg daily, increased around time of last visit.  Most recent labs nl.  TSH   Date Value Ref Range Status   03/24/2023 0.50 0.30 - 4.20 uIU/mL Final   08/17/2021 2.75 0.40 - 4.00 mU/L Final   03/15/2021 2.93 0.40 - 4.00 mU/L Final     T4 Free   Date Value Ref Range Status   06/05/2020 1.05 0.76 - 1.46 ng/dL Final     Free T4   Date Value Ref Range Status   03/24/2023 1.26 0.90 - 1.70 ng/dL Final       4)  Hospitalized again at [facility] for UTI and E coli sepsis.  No UTI symptoms with these episodes.  Question of whether prophylaxis w/ abx should be considered.     Review of systems:  A complete Review Of Systems was performed but was negative except as noted in the HPI.    Past Medical and Surgical History:  Past Medical History:   Diagnosis Date    Benign paroxysmal positional vertigo     occ.     Calculus of kidney 05/2005    x1 on L side passed, several stones.  Has been tested for oxalate.    Chronic abdominal pain 07/17/2013    Chronic pancreatitis 07/17/2013    Depression     also occ panic spells    Diabetes (H) 5/10/2013    Total Pancreatomy with Auto Islets Transplant    Dyspepsia 06/1999    H. pylori   treated    Dysthymia 03/01/2016    Headaches     still periodic HA's ;  often 5X/week    Hypertension 02/22/2016    Stress related    Iron deficiency anemia 11/2003    relates to gastric bypass    Post-pancreatectomy diabetes melltius 05/17/2013    Sleep apnea     uses splint    Spasm of sphincter of Oddi     surgical + [place] of pancreatic  [place] [facility] [place]    Thyroid nodule 2016     Past Surgical History:   Procedure Laterality Date    ABDOMINOPLASTY  2002    Tummy tuck    APPENDECTOMY  1990    BUNIONECTOMY Right 1998    CBD Stent placement  2002    CBD stent; Dr. Presley     SECTION      CHOLECYSTECTOMY      COLONOSCOPY N/A 2021    Procedure: COLONOSCOPY INCOMPLETE Aborted due to incomplete prep  will need to take additional prep and return tomorrow 21;  Surgeon: Ihsan Saenz MD;  Location: [facility]    COMBINED CYSTOSCOPY, RETROGRADES, URETEROSCOPY, LASER HOLMIUM LITHOTRIPSY URETER(S), INSERT STENT Right 2015    Procedure: COMBINED CYSTOSCOPY, RETROGRADES, URETEROSCOPY, LASER HOLMIUM LITHOTRIPSY URETER(S), INSERT STENT;  Surgeon: Kennedi Aldana MD;  Location: [facility]    COMBINED CYSTOSCOPY, RETROGRADES, URETEROSCOPY, LASER HOLMIUM LITHOTRIPSY URETER(S), INSERT STENT Right 2015    Procedure: COMBINED CYSTOSCOPY, RETROGRADES, URETEROSCOPY, LASER HOLMIUM LITHOTRIPSY URETER(S), INSERT STENT;  Surgeon: Kennedi Aldana MD;  Location: [facility]    COSMETIC SURGERY  2002    Tummy tuck    CYSTECTOMY OVARIAN BENIGN Right     CYSTOSCOPY, RETROGRADES, INSERT STENT URETER(S), COMBINED  10/02/2012    Procedure: COMBINED CYSTOSCOPY, RETROGRADES, INSERT STENT URETER(S);  COMBINED CYSTOSCOPY,  , INSERT LEFT STENT URETER;  Surgeon: Johny Baez MD;  Location: [facility]    CYSTOSCOPY, RETROGRADES, INSERT STENT URETER(S), COMBINED Left 2022    Procedure: Cystoscopy with left retrograde pyelogram, left ureteroscopy, thulium laser lithotripsy of left ureteral calculus, stone basketing and left ureteral stent insertion;  Surgeon: Alonzo Rome MD;  Location: [facility]    ESOPHAGOSCOPY, GASTROSCOPY, DUODENOSCOPY (EGD), COMBINED N/A 2018    Procedure: COMBINED ESOPHAGOSCOPY, GASTROSCOPY, DUODENOSCOPY (EGD);  ESOPHAGOSCOPY, GASTROSCOPY, DUODENOSCOPY (EGD)    ;  Surgeon: Tamir Rodgers  MD;  Location: [facility]    EXTRACORPOREAL SHOCK WAVE LITHOTRIPSY (ESWL)  10/16/2012    Procedure: EXTRACORPOREAL SHOCK WAVE LITHOTRIPSY (ESWL);  left EXTRACORPOREAL SHOCK WAVE LITHOTRIPSY (ESWL) ;  Surgeon: Johny Baez MD;  Location: [facility]    Gastric bypass NOS      HERNIA REPAIR  02/2015    HYSTERECTOMY SUPRACERVICAL, BILATERAL SALPINGO-OOPHORECTOMY, COMBINED N/A 02/01/2022    Procedure: Abdominal supracervical hysterectomy, bilateral salpingooophrectomy;  Surgeon: Nicole Rivera MD;  [facility]    IRRIGATION AND DEBRIDEMENT HAND, COMBINED Left 10/30/2020    Procedure: Left hand sharp excisional debridement of skin, subcutaneous tissue and fat with a scalpel, 2 x 1 x 1 cm.;  Surgeon: Demian Renteria MD;  Location: [facility]    LAPAROSCOPIC LYSIS ADHESIONS N/A 02/20/2015    Procedure: LAPAROSCOPIC LYSIS ADHESIONS;  Surgeon: Aaron Early MD;  [facility]    LAPAROSCOPIC LYSIS ADHESIONS N/A 12/29/2015    Procedure: LAPAROSCOPIC LYSIS ADHESIONS;  Surgeon: Aaron Early MD;  [facility]    PANCREATECTOMY, TRANSPLANT AUTO ISLET CELL, COMBINED  05/10/2013    Procedure: COMBINED PANCREATECTOMY, TRANSPLANT AUTO ISLET CELL;  Pancreatectomy, Auto Islet Cell Transplant   hernia repair, jejunostomy tube and liver biopsies with Anesthesia General with block;  Surgeon: Aaron Early MD;  [facility]    Partial ileum resection  1992    RECTOPEXY ABDOMINAL N/A 02/01/2022    Procedure: RECTOPEXY, ABDOMINAL;  Surgeon: Uriah Sheridan MD;  [facility]    REPAIR PTOSIS BROW BILATERAL Bilateral 06/09/2020    Procedure: BILATERAL BROW PTOSIS REPAIR;  Surgeon: Denise Alberts MD;  Location: [facility]    SACROCOLPOPEXY, CYSTOSCOPY, COMBINED N/A 02/01/2022    Procedure: Uterosacral ligament suspension, pina colposuspension with Cystoscopy;  Surgeon: Nicole Rivera MD;  [facility]    SIGMOIDOSCOPY FLEXIBLE N/A 02/01/2022    Procedure: SIGMOIDOSCOPY, FLEXIBLE;  Surgeon:  Uriah Sheridan MD;  [facility]    Surgery for SBO      TONSILLECTOMY, ADENOIDECTOMY, COMBINED  1997    TRANSPLANT  5/10/13    Pancreatic Auto-Islet Transplant       Family History:  New changes since last visit:  none  Family History   Problem Relation Age of Onset    Family History Negative Mother     Respiratory Father         COPD;  at 69    Genitourinary Problems Father         kidney stones    Substance Abuse Father     Depression Father     Asthma Father     Heart Disease Paternal Grandfather         M.I.    Coronary Artery Disease Paternal Grandfather     Hyperlipidemia Paternal Grandfather     Genitourinary Problems Brother         multiple brothers with kidney stones    Gastrointestinal Disease Maternal Grandmother         undiagnosed 'gut' issues    Coronary Artery Disease Maternal Grandfather     Hyperlipidemia Maternal Grandfather     Cerebrovascular Disease Paternal Grandmother         At the age of 103    Anxiety Disorder Paternal Grandmother     Osteoporosis Paternal Grandmother     Anxiety Disorder Son     Anxiety Disorder Daughter     Asthma Daughter     Colon Cancer No family hx of        Social History:  Social History     Social History Narrative    Not on file     Does not work currently.  Mom to many foster dogs     Physical Exam:  Vitals: LMP 2013   BMI= There is no height or weight on file to calculate BMI.  General:  Appearance is normal, no acute distress  HEENT:  NC/AT, sclera appear white  Neck:  No thyroid nodule palpable.   Neck:  No obvious thyromegaly  CV/Lungs:  Non distressed breathing  Skin:  No apparent rashes.   Neuro:  Normal mental status  Psych:  Normal affect    Results.I    Mixed Meal Test:  C Peptide   Date Value Ref Range Status   2023 2.2 0.9 - 6.9 ng/mL Final   2023 1.8 0.9 - 6.9 ng/mL Final   09/15/2022 1.6 0.9 - 6.9 ng/mL Final   09/15/2022 1.2 0.9 - 6.9 ng/mL Final   09/15/2022 0.3 (L) 0.9 - 6.9 ng/mL Final   2020 3.4  0.9 - 6.9 ng/mL Final   08/20/2020 1.5 0.9 - 6.9 ng/mL Final   08/20/2020 0.5 (L) 0.9 - 6.9 ng/mL Final   05/16/2019 1.8 0.9 - 6.9 ng/mL Final   05/16/2019 1.5 0.9 - 6.9 ng/mL Final     Glucose   Date Value Ref Range Status   09/07/2023 209 (H) 70 - 99 mg/dL Final   09/07/2023 344 (H) 70 - 99 mg/dL Final   09/07/2023 183 (H) 70 - 99 mg/dL Final   09/07/2023 183 (H) 70 - 99 mg/dL Final   07/03/2023 130 (H) 70 - 99 mg/dL Final   06/14/2022 104 (H) 70 - 99 mg/dL Final   04/16/2022 241 (H) 70 - 99 mg/dL Final   02/01/2022 90 70 - 99 mg/dL Final   01/18/2022 123 (H) 70 - 99 mg/dL Final   11/26/2021 139 (H) 70 - 99 mg/dL Final   05/10/2021 169 (H) 70 - 99 mg/dL Final   03/01/2021 141 (H) 70 - 99 mg/dL Final   02/28/2021 120 (H) 70 - 99 mg/dL Final   02/16/2021 106 (H) 70 - 99 mg/dL Final   10/30/2020 126 (H) 70 - 99 mg/dL Final     GLUCOSE BY METER POCT   Date Value Ref Range Status   09/23/2022 216 (H) 70 - 99 mg/dL Final   09/23/2022 157 (H) 70 - 99 mg/dL Final   09/23/2022 192 (H) 70 - 99 mg/dL Final   09/23/2022 71 70 - 99 mg/dL Final   09/22/2022 303 (H) 70 - 99 mg/dL Final       Hemoglobin A1c  Lab Results   Component Value Date    A1C 7.1 09/07/2023    A1C 6.6 04/03/2023    A1C 6.5 01/19/2023    A1C 7.7 09/15/2022    A1C 8.2 05/08/2022    A1C 6.8 05/10/2021    A1C 6.9 02/16/2021    A1C 6.7 08/20/2020    A1C 7.1 06/05/2020    A1C 6.9 02/06/2020       Liver enzymes  Lab Results   Component Value Date    AST 47 09/07/2023    AST 24 05/10/2021     Lab Results   Component Value Date    ALT 69 09/07/2023    ALT 35 05/10/2021     Lab Results   Component Value Date    BILICONJ 0.0 05/12/2014      Lab Results   Component Value Date    BILITOTAL 0.4 09/07/2023    BILITOTAL 0.3 05/10/2021     Lab Results   Component Value Date    ALBUMIN 4.0 09/07/2023    ALBUMIN 3.2 04/16/2022    ALBUMIN 3.4 05/10/2021     Lab Results   Component Value Date    PROTTOTAL 6.7 09/07/2023    PROTTOTAL 6.9 05/10/2021      Lab Results   Component  Value Date    ALKPHOS 112 09/07/2023    ALKPHOS 132 05/10/2021       Creatinine:  Creatinine   Date Value Ref Range Status   09/07/2023 0.91 0.51 - 0.95 mg/dL Final   05/10/2021 0.81 0.52 - 1.04 mg/dL Final   ]    Complete Blood Count:  CBC RESULTS:   Recent Labs   Lab Test 11/10/23  1258   WBC 6.4   RBC 3.96   HGB 11.6*   HCT 36.1   MCV 91   MCH 29.3   MCHC 32.1   RDW 15.2*          Cholesterol  Lab Results   Component Value Date    CHOL 174 09/07/2023    CHOL 192 09/17/2020     Lab Results   Component Value Date    HDL 88 09/07/2023    HDL 91 09/17/2020     Lab Results   Component Value Date    LDL 72 09/07/2023    LDL 84 09/17/2020     Lab Results   Component Value Date    TRIG 71 09/07/2023    TRIG 83 09/17/2020     Lab Results   Component Value Date    CHOLHDLRATIO 1.9 11/20/2014           Assessment:  1.  Post pancreatectomy diabetes mellitus, s/p total pancreatectomy and islet autotransplant.    Lynn is a 58 year old with history of chronic pancreatitis who is s/p total pancreatectomy and islet autotransplant.  She was insulin independent until 6/6/2017 (just after 4 years post-tx) and now has partial islet function.    She is on Fiasp due to rapid gastric emptying and post meal hypoglycemia.      She remains on the Omnipod 5.  This remains quite helpful and overnight control is quite good.  I would like to see fewer postprandial highs.  She is going to start with more routine bolusing 10 minutes before meals, and we will also watch as she recovers from Influenza A, but if still having highs, next step is to adjust the carb ratio.       Plan:  1.  Changes to current diabetes regimen:  Patient Instructions   1)  Your recent numbers are too high after meals, but this is also complicated by the problems with the enzymes, influenza, and forgetting to pre-bolus.    2)  Next steps-- as you recover from flu, you will work on bolusing 10 minutes before a meal.  If you still are seeing a lot of high #s  after meals when you are well and bolusing 10 minutes early, then my next step would be to change the carb ratio to 12 or 13g instead of 15g.    3)  We can wait until the visit after next (~summer 2024) for labs    4)   Follow up April 18 at 2:20    2.  Frequency of blood sugar checks:  premeal and bedtime, and as needed for hypoglycemia (6 strip per day).    3.  Continue routine follow up for autoislet transplant patients:  Mixed meal test (6 mL/kg BoostHP to max of 360 mL) at 3 months, 6 months, and once a year post transplant.  Hemoglobin A1c levels at these time points and quarterly.    4.  Other issues addressed today:  As above    Follow up:  As above        Contact me for questions at [phone] or [phone].  Emergency number to reach pediatric endocrinology after hours is [phone].    Dr. Adriana Robles MD  [facility] [place]  Director of Research, [facility] Auto-transplant Program  Phone:  [phone]  Electronically signed: January 5, 2024 at 12:05 AM      Review of the result(s) of each unique test - CGM, pump  Prescription drug management  40 minutes spent by me on the date of the encounter doing chart review, history and exam, documentation and further activities per the note

## 2024-01-04 NOTE — ED TRIAGE NOTES
Patient arrives complaining of sinus congestion, body aches, fever, headache and chest tightness.     Seen at urgent care and on augmentin with no improvement.     Aleve, mucinex and augmentin at 9pm as well as night meds     Triage Assessment (Adult)       Row Name 01/04/24 0111          Triage Assessment    Airway WDL WDL        Respiratory WDL    Respiratory WDL X        Skin Circulation/Temperature WDL    Skin Circulation/Temperature WDL WDL        Cardiac WDL    Cardiac WDL X;chest pain  tightness

## 2024-01-04 NOTE — LETTER
1/4/2024         RE: Lynn Thompson  [id number] [facility] [place] [address]        Dear Colleague,    Thank you for referring your patient, Lynn Thompson, to the [facility]. Please see a copy of my visit note below.    Lynn is a 58 year old who is being evaluated via a billable video visit.          Video-Visit Details    Type of service:  Video Visit   Video Start Time:  2:30  Video End Time: 2:46    Originating Location (pt. Location): Home    Distant Location (provider location):  On-site  Platform used for Video Visit: [facility]  Islet Autotransplant, Diabetes Follow Up    Problem List:  Patient Active Problem List   Diagnosis     Iron deficiency anemia secondary to inadequate dietary iron intake     Gastric bypass status for obesity     Health Care Home     Chronic pain syndrome     Exocrine pancreatic insufficiency     Asplenia     BLU (obstructive sleep apnea)     H/O of rectopexy     Dysthymia     Anxiety     Thyroid nodule     Acquired hypothyroidism     Post-pancreatectomy diabetes (H)     Other iron deficiency anemia     Uterovaginal prolapse, unspecified     Rectal prolapse     Small bowel obstruction (H)     Urge incontinence     Urinary urgency     High-tone pelvic floor dysfunction     Pyuria     Recurrent kidney stones     History of uterine prolapse     Vaginal atrophy     Enteric hyperoxaluria     Hypocitraturia     Acute pyelonephritis     Acute sepsis (H)       HPI:  Lynn is a 58 year old female here for follow up o total pancreatectomy, islet cell autotransplant, splenectomy, liver biopsy (needle core), choledochoduodenostomy, feeding jejunostomy performed on 5/10/2013.  At the time of the procedure, the patient received 178,100 IEQ, or 3,209 IEQ/kg body weight. She has had two subsequent exploratory laparatomy for small bowel obstruction. Other notable endocrine history includes a complex cystic nodule  in mid right thyroid lobe with 1 cm maximum diameter (saw Dr Adorno in 11/2016) and mild hypothyroidism followed by PCP, pathology in 2018 by FNA showed a benign nodule (includes adenomatoid nodule, colloid nodule, etc.).  She had an episode of cholangitis and was hospitalized for 4 days 8/24/17 (fevers, RUQ pain, + Ecoli blood cx).    She was on NO insulin at her 1 year, 2 year, 3 year, 4 year transplant anniversaries and restart insulin just after 4 years post-tx, insulin restart date of  6/6/2017.   She had a slow opioid wean off suboxone but eventually did come off.     Other history notable for surgical procedure Admitted from 2/1- 2/5/22 for  1. Posterior suture rectopexy (Colorectal primary)  2. Sigmoidoscopy (Colorectal primary)  3. Supracervical hysterectomy with bilateral salpingooophrectomy (Uro)  4. Uterosacral ligament suspension (Uro)  5. Gan colposuspension (Uro)  6. Cystoscopy (Uro)        1)  Diabetes:  Lynn continues to have partial islet graft function.   She is on Fiasp right before meal time and has rapid gastric emptying.  Recent complications affecting DM:  Off viokace due to insurance (see below); then developed influenza this week, now recovering.  Also says she has not been regular about pre-meal bolusing and will sometimes bolus once she starts eating.  In context of all this we are seeing quite a few more post prandial hyperglycemia episodes today.     Continues on Omnipod 5 and is very happy with this.   Nighttime have been very stable.  Seeing that she is having more prandial higs today, but she also notes taking some delayed bolus doses, and then these intercurrent health issues of running out of enzymes and Influnenza A today are complicating this.   No pump or CGM issues.  Main issue of note is that she is still having post prandial highs, improved somewhat with dose change to I:C last visit.     Pump, settings below.    Max Basal 1.0 unit(s)/hr     Basal Rates:  12a 0.15  unit(s)/hr  8a 0.25 units/hr      Target Glucose  12a-8a 130 Correct above 140  8a-12 a 120 Correct above 130     Sensitivity   12a -8a 170  8am-12a 140     Insulin to Carb Ratio  12 am 15 grams     Max Bolus 6  Active insulin time 3 hours  Reverse correction off        Total daily insulin dose= 10 units of Fiasp, of which 4.4 units per day is basal on pump, 5.6 units is bolus.          HbA1c levels:  Lab Results   Component Value Date    A1C 7.1 09/07/2023    A1C 6.6 04/03/2023    A1C 6.5 01/19/2023    A1C 7.7 09/15/2022    A1C 8.2 05/08/2022    A1C 6.8 05/10/2021    A1C 6.9 02/16/2021    A1C 6.7 08/20/2020    A1C 7.1 06/05/2020    A1C 6.9 02/06/2020           Hypoglycemia history:   Recent history as above.  The patient has had NO episodes of severe hypoglycemia (seizure, coma, or neuroglycopenic symptoms severe enough to require assistance from another person).  Blood sugars were reviewed from the patient records and/or the meter download.        Patient is good candidate for CGM therapy.  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   --  Require frequent adjustments of the insulin treatment regimen, based on therapeutic CGM test results      2)  Nutrition./PERT:  Complicated by denial of Viokace, now back on this.  Due to insurance had to switch to ZenPep for a short time.  We already knew ZenPep and Creon did not work for her as enteric coated enzymes but unfortunately this did not get approved by insurance.  When on ZenPep, she had malabsorption to the point of needing to wear a diaper, bloating, felt awful.  Now on  Viokace again, stools are formed, bloating improved, not 100% back to baseline but much better.     Regaining weight, up to 135 pounds per her report.     Last nutritional studies:  Component      Latest Ref Rng & Units 9/6/2022 9/15/2022   Iron      37 - 145 ug/dL 69    Iron Sat Index      15 - 46 % 27    Iron Binding Capacity      240 - 430 ug/dL 255    Vitamin A      0.30 - 1.20 mg/L  0.49   Retinol Palmitate      0.00 - 0.10 mg/L  <0.02   Vitamin A Interp        Normal   25 OH Vit D2      ug/L  <5   25 OH Vit D3      ug/L  58   25 OH Vit D total      20 - 75 ug/L  <63   Vitamin E      5.5 - 18.0 mg/L  10.3   Vitamin E Gamma      0.0 - 6.0 mg/L  0.9   Vitamin B12      232 - 1,245 pg/mL  418       3)  Thyroid:  On levothyroxine 150 mcg daily, increased around time of last visit.  Most recent labs nl.  TSH   Date Value Ref Range Status   03/24/2023 0.50 0.30 - 4.20 uIU/mL Final   08/17/2021 2.75 0.40 - 4.00 mU/L Final   03/15/2021 2.93 0.40 - 4.00 mU/L Final     T4 Free   Date Value Ref Range Status   06/05/2020 1.05 0.76 - 1.46 ng/dL Final     Free T4   Date Value Ref Range Status   03/24/2023 1.26 0.90 - 1.70 ng/dL Final       4)  Hospitalized again at [facility] for UTI and E coli sepsis.  No UTI symptoms with these episodes.  Question of whether prophylaxis w/ abx should be considered.     Review of systems:  A complete Review Of Systems was performed but was negative except as noted in the HPI.    Past Medical and Surgical History:  Past Medical History:   Diagnosis Date     Benign paroxysmal positional vertigo     occ.      Calculus of kidney 05/2005    x1 on L side passed, several stones.  Has been tested for oxalate.     Chronic abdominal pain 07/17/2013     Chronic pancreatitis 07/17/2013     Depression     also occ panic spells     Diabetes (H) 5/10/2013    Total Pancreatomy with Auto Islets Transplant     Dyspepsia 06/1999    H. pylori   treated     Dysthymia 03/01/2016     Headaches     still periodic HA's  ;  often 5X/week     Hypertension 2016    Stress related     Iron deficiency anemia 2003    relates to gastric bypass     Post-pancreatectomy diabetes melltius 2013     Sleep apnea     uses splint     Spasm of sphincter of Oddi     surgical + endoscopic stenting of pancreatic duct @ [facility] 06     Thyroid nodule 2016     Past Surgical History:   Procedure Laterality Date     ABDOMINOPLASTY  2002    Tummy tuck     APPENDECTOMY  1990     BUNIONECTOMY Right 1998     CBD Stent placement  2002    CBD stent; Dr. Presley      SECTION       CHOLECYSTECTOMY       COLONOSCOPY N/A 2021    Procedure: COLONOSCOPY INCOMPLETE Aborted due to incomplete prep  will need to take additional prep and return tomorrow 21;  Surgeon: Ihsan Saenz MD;  Location: [facility]     COMBINED CYSTOSCOPY, RETROGRADES, URETEROSCOPY, LASER HOLMIUM LITHOTRIPSY URETER(S), INSERT STENT Right 2015    Procedure: COMBINED CYSTOSCOPY, RETROGRADES, URETEROSCOPY, LASER HOLMIUM LITHOTRIPSY URETER(S), INSERT STENT;  Surgeon: Kennedi Aldana MD;  Location: [facility]     COMBINED CYSTOSCOPY, RETROGRADES, URETEROSCOPY, LASER HOLMIUM LITHOTRIPSY URETER(S), INSERT STENT Right 2015    Procedure: COMBINED CYSTOSCOPY, RETROGRADES, URETEROSCOPY, LASER HOLMIUM LITHOTRIPSY URETER(S), INSERT STENT;  Surgeon: Kennedi Aldana MD;  Location: [facility]     COSMETIC SURGERY  2002    Tummy tuck     CYSTECTOMY OVARIAN BENIGN Right      CYSTOSCOPY, RETROGRADES, INSERT STENT URETER(S), COMBINED  10/02/2012    Procedure: COMBINED CYSTOSCOPY, RETROGRADES, INSERT STENT URETER(S);  COMBINED CYSTOSCOPY,  , INSERT LEFT STENT URETER;  Surgeon: Johny Baez MD;  Location: [facility]     CYSTOSCOPY, RETROGRADES, INSERT STENT URETER(S), COMBINED Left 2022    Procedure: Cystoscopy with left retrograde pyelogram, left ureteroscopy, thulium laser lithotripsy of left ureteral calculus, stone basketing and left  ureteral stent insertion;  Surgeon: Alonzo Rome MD;  Location: [facility]     ESOPHAGOSCOPY, GASTROSCOPY, DUODENOSCOPY (EGD), COMBINED N/A 01/24/2018    Procedure: COMBINED ESOPHAGOSCOPY, GASTROSCOPY, DUODENOSCOPY (EGD);  ESOPHAGOSCOPY, GASTROSCOPY, DUODENOSCOPY (EGD)    ;  Surgeon: Tamir Rodgers MD;  Location: [facility]     EXTRACORPOREAL SHOCK WAVE LITHOTRIPSY (ESWL)  10/16/2012    Procedure: EXTRACORPOREAL SHOCK WAVE LITHOTRIPSY (ESWL);  left EXTRACORPOREAL SHOCK WAVE LITHOTRIPSY (ESWL) ;  Surgeon: Johny Baez MD;  Location: [facility]     Gastric bypass NOS       HERNIA REPAIR  02/2015     HYSTERECTOMY SUPRACERVICAL, BILATERAL SALPINGO-OOPHORECTOMY, COMBINED N/A 02/01/2022    Procedure: Abdominal supracervical hysterectomy, bilateral salpingooophrectomy;  Surgeon: Nicole Rivera MD;  [facility]     IRRIGATION AND DEBRIDEMENT HAND, COMBINED Left 10/30/2020    Procedure: Left hand sharp excisional debridement of skin, subcutaneous tissue and fat with a scalpel, 2 x 1 x 1 cm.;  Surgeon: Demian Renteria MD;  Location: [facility]     LAPAROSCOPIC LYSIS ADHESIONS N/A 02/20/2015    Procedure: LAPAROSCOPIC LYSIS ADHESIONS;  Surgeon: Aaron Early MD;  [facility]     LAPAROSCOPIC LYSIS ADHESIONS N/A 12/29/2015    Procedure: LAPAROSCOPIC LYSIS ADHESIONS;  Surgeon: Aaron Early MD;  [facility]     PANCREATECTOMY, TRANSPLANT AUTO ISLET CELL, COMBINED  05/10/2013    Procedure: COMBINED PANCREATECTOMY, TRANSPLANT AUTO ISLET CELL;  Pancreatectomy, Auto Islet Cell Transplant   hernia repair, jejunostomy tube and liver biopsies with Anesthesia General with block;  Surgeon: Aaron Early MD;  [facility]     Partial ileum resection  1992     RECTOPEXY ABDOMINAL N/A 02/01/2022    Procedure: RECTOPEXY, ABDOMINAL;  Surgeon: Uriah Sheridan MD;  [facility]     REPAIR PTOSIS BROW BILATERAL Bilateral 06/09/2020    Procedure: BILATERAL BROW PTOSIS REPAIR;  Surgeon: Denise Alberts  MD Veena;  Location: [facility]     SACROCOLPOPEXY, CYSTOSCOPY, COMBINED N/A 2022    Procedure: Uterosacral ligament suspension, pina colposuspension with Cystoscopy;  Surgeon: Nicole Rivera MD;  [facility]     SIGMOIDOSCOPY FLEXIBLE N/A 2022    Procedure: SIGMOIDOSCOPY, FLEXIBLE;  Surgeon: Uriah Sheridan MD;  [facility]     Surgery for SBO       TONSILLECTOMY, ADENOIDECTOMY, COMBINED  1997     TRANSPLANT  5/10/13    Pancreatic Auto-Islet Transplant       Family History:  New changes since last visit:  none  Family History   Problem Relation Age of Onset     Family History Negative Mother      Respiratory Father         COPD;  at 69     Genitourinary Problems Father         kidney stones     Substance Abuse Father      Depression Father      Asthma Father      Heart Disease Paternal Grandfather         M.I.     Coronary Artery Disease Paternal Grandfather      Hyperlipidemia Paternal Grandfather      Genitourinary Problems Brother         multiple brothers with kidney stones     Gastrointestinal Disease Maternal Grandmother         undiagnosed 'gut' issues     Coronary Artery Disease Maternal Grandfather      Hyperlipidemia Maternal Grandfather      Cerebrovascular Disease Paternal Grandmother         At the age of 103     Anxiety Disorder Paternal Grandmother      Osteoporosis Paternal Grandmother      Anxiety Disorder Son      Anxiety Disorder Daughter      Asthma Daughter      Colon Cancer No family hx of        Social History:  Social History     Social History Narrative     Not on file     Does not work currently.  Mom to many foster dogs     Physical Exam:  Vitals: LMP 2013   BMI= There is no height or weight on file to calculate BMI.  General:  Appearance is normal, no acute distress  HEENT:  NC/AT, sclera appear white  Neck:  No thyroid nodule palpable.   Neck:  No obvious thyromegaly  CV/Lungs:  Non distressed breathing  Skin:  No apparent rashes.    Neuro:  Normal mental status  Psych:  Normal affect    Results.I    Mixed Meal Test:  C Peptide   Date Value Ref Range Status   09/07/2023 2.2 0.9 - 6.9 ng/mL Final   09/07/2023 1.8 0.9 - 6.9 ng/mL Final   09/15/2022 1.6 0.9 - 6.9 ng/mL Final   09/15/2022 1.2 0.9 - 6.9 ng/mL Final   09/15/2022 0.3 (L) 0.9 - 6.9 ng/mL Final   08/20/2020 3.4 0.9 - 6.9 ng/mL Final   08/20/2020 1.5 0.9 - 6.9 ng/mL Final   08/20/2020 0.5 (L) 0.9 - 6.9 ng/mL Final   05/16/2019 1.8 0.9 - 6.9 ng/mL Final   05/16/2019 1.5 0.9 - 6.9 ng/mL Final     Glucose   Date Value Ref Range Status   09/07/2023 209 (H) 70 - 99 mg/dL Final   09/07/2023 344 (H) 70 - 99 mg/dL Final   09/07/2023 183 (H) 70 - 99 mg/dL Final   09/07/2023 183 (H) 70 - 99 mg/dL Final   07/03/2023 130 (H) 70 - 99 mg/dL Final   06/14/2022 104 (H) 70 - 99 mg/dL Final   04/16/2022 241 (H) 70 - 99 mg/dL Final   02/01/2022 90 70 - 99 mg/dL Final   01/18/2022 123 (H) 70 - 99 mg/dL Final   11/26/2021 139 (H) 70 - 99 mg/dL Final   05/10/2021 169 (H) 70 - 99 mg/dL Final   03/01/2021 141 (H) 70 - 99 mg/dL Final   02/28/2021 120 (H) 70 - 99 mg/dL Final   02/16/2021 106 (H) 70 - 99 mg/dL Final   10/30/2020 126 (H) 70 - 99 mg/dL Final     GLUCOSE BY METER POCT   Date Value Ref Range Status   09/23/2022 216 (H) 70 - 99 mg/dL Final   09/23/2022 157 (H) 70 - 99 mg/dL Final   09/23/2022 192 (H) 70 - 99 mg/dL Final   09/23/2022 71 70 - 99 mg/dL Final   09/22/2022 303 (H) 70 - 99 mg/dL Final       Hemoglobin A1c  Lab Results   Component Value Date    A1C 7.1 09/07/2023    A1C 6.6 04/03/2023    A1C 6.5 01/19/2023    A1C 7.7 09/15/2022    A1C 8.2 05/08/2022    A1C 6.8 05/10/2021    A1C 6.9 02/16/2021    A1C 6.7 08/20/2020    A1C 7.1 06/05/2020    A1C 6.9 02/06/2020       Liver enzymes  Lab Results   Component Value Date    AST 47 09/07/2023    AST 24 05/10/2021     Lab Results   Component Value Date    ALT 69 09/07/2023    ALT 35 05/10/2021     Lab Results   Component Value Date    BILICONJ 0.0  05/12/2014      Lab Results   Component Value Date    BILITOTAL 0.4 09/07/2023    BILITOTAL 0.3 05/10/2021     Lab Results   Component Value Date    ALBUMIN 4.0 09/07/2023    ALBUMIN 3.2 04/16/2022    ALBUMIN 3.4 05/10/2021     Lab Results   Component Value Date    PROTTOTAL 6.7 09/07/2023    PROTTOTAL 6.9 05/10/2021      Lab Results   Component Value Date    ALKPHOS 112 09/07/2023    ALKPHOS 132 05/10/2021       Creatinine:  Creatinine   Date Value Ref Range Status   09/07/2023 0.91 0.51 - 0.95 mg/dL Final   05/10/2021 0.81 0.52 - 1.04 mg/dL Final   ]    Complete Blood Count:  CBC RESULTS:   Recent Labs   Lab Test 11/10/23  1258   WBC 6.4   RBC 3.96   HGB 11.6*   HCT 36.1   MCV 91   MCH 29.3   MCHC 32.1   RDW 15.2*          Cholesterol  Lab Results   Component Value Date    CHOL 174 09/07/2023    CHOL 192 09/17/2020     Lab Results   Component Value Date    HDL 88 09/07/2023    HDL 91 09/17/2020     Lab Results   Component Value Date    LDL 72 09/07/2023    LDL 84 09/17/2020     Lab Results   Component Value Date    TRIG 71 09/07/2023    TRIG 83 09/17/2020     Lab Results   Component Value Date    CHOLHDLRATIO 1.9 11/20/2014           Assessment:  1.  Post pancreatectomy diabetes mellitus, s/p total pancreatectomy and islet autotransplant.    Lynn is a 58 year old with history of chronic pancreatitis who is s/p total pancreatectomy and islet autotransplant.  She was insulin independent until 6/6/2017 (just after 4 years post-tx) and now has partial islet function.    She is on Fiasp due to rapid gastric emptying and post meal hypoglycemia.      She remains on the Omnipod 5.  This remains quite helpful and overnight control is quite good.  I would like to see fewer postprandial highs.  She is going to start with more routine bolusing 10 minutes before meals, and we will also watch as she recovers from Influenza A, but if still having highs, next step is to adjust the carb ratio.       Plan:  1.  Changes  to current diabetes regimen:  Patient Instructions   1)  Your recent numbers are too high after meals, but this is also complicated by the problems with the enzymes, influenza, and forgetting to pre-bolus.    2)  Next steps-- as you recover from flu, you will work on bolusing 10 minutes before a meal.  If you still are seeing a lot of high #s after meals when you are well and bolusing 10 minutes early, then my next step would be to change the carb ratio to 12 or 13g instead of 15g.    3)  We can wait until the visit after next (~summer 2024) for labs    4)   Follow up April 18 at 2:20    2.  Frequency of blood sugar checks:  premeal and bedtime, and as needed for hypoglycemia (6 strip per day).    3.  Continue routine follow up for autoislet transplant patients:  Mixed meal test (6 mL/kg BoostHP to max of 360 mL) at 3 months, 6 months, and once a year post transplant.  Hemoglobin A1c levels at these time points and quarterly.    4.  Other issues addressed today:  As above    Follow up:  As above        Contact me for questions at [phone] or [phone].  Emergency number to reach pediatric endocrinology after hours is [phone].    Dr. Adriana Robles MD  [facility] [place]  Director of Research, Islet Auto-transplant Program  Phone:  [phone]  Electronically signed: January 5, 2024 at 12:05 AM      Review of the result(s) of each unique test - CGM, pump  Prescription drug management  40 minutes spent by me on the date of the encounter doing chart review, history and exam, documentation and further activities per the note        Again, thank you for allowing me to participate in the care of your patient.        Sincerely,        Adriana Robles MD

## 2024-01-04 NOTE — PATIENT INSTRUCTIONS
1)  Your recent numbers are too high after meals, but this is also complicated by the problems with the enzymes, influenza, and forgetting to pre-bolus.    2)  Next steps-- as you recover from flu, you will work on bolusing 10 minutes before a meal.  If you still are seeing a lot of high #s after meals when you are well and bolusing 10 minutes early, then my next step would be to change the carb ratio to 12 or 13g instead of 15g.    3)  We can wait until the visit after next (~summer 2024) for labs    4)   Follow up April 18 at 2:20

## 2024-01-09 NOTE — PROGRESS NOTES
[facility]  Hospitalist Progress Note  Nnamdi Ahumada MD 06/12/20    Reason for Stay (Diagnosis):  Pyelonephritis         Assessment and Plan:      Summary of Stay:  Lynn Thompson is a 55 year old female  with history of previous islet cell transplant 2013, IDDM, iron deficiency anemia, BLU on CPAP, hypothyroidism, MDD, GERD, chronic pain, and nephrolithiasis who was admitted on 6/11/2020 with dysuria, fevers, and myalgias with urinalysis consistent with UTI.  Also with some left flank pain so more likely pyelonephritis.  COVID19 PCR negative.  Started on IV ceftriaxone.  Urine culture growing E. coli.  Feeling a little bit better today.    Problem List/Assessment and Plan:   Sepsis secondary to pyelonephritis: Primary symptoms of dysuria, fever, and myalgias with urinalysis with significant pyuria, large LE, and positive nitrates consistent with UTI.  Initially with fever, leukocytosis, and tachycardia consistent with sepsis.  Lactic acid not elevated.  COVID19 PCR negative.  Has ongoing mild to moderate constant left-sided flank pain so likely pyelonephritis.  Less suspicious for obstructive stone given no hematuria and no renal colic type of pain.  -IV ceftriaxone 2g every 24 hours  -IV normal saline 100 ml/hr  -acetaminophen, ibuprofen, oxycodone as needed for pain, headaches, and myalgias  -Urine culture growing E. coli  -Blood cultures NGTD  -If worsening clinical condition or develops significant colicky type flank pain we will plan to obtain CT of the abdomen/pelvis to evaluate for any nephrolithiasis or obstruction (seems less likely given normal creatinine, only mild constant flank pain, and no hematuria)     IDDM: Previous auto islet cell transplant in 2013.  She reports difficult to control diabetes at home with either very high or very low blood sugars despite only using small amounts of insulin.  Did have hypoglycemia after home Lantus 6 units initially.    -Decrease Lantus to 4  "units at bedtime  -2 units aspart with meals along with medium dose sliding scale insulin   -Further adjustment likely necessary while here   -Resume PTA Viokace with meals and snacks    Recent ptosis surgery: Recent bilateral surgery for ptosis.  Sutures in place.  No surrounding erythema or significant pain in this area.  -Resume PTA erythromycin ointment 3 times daily through 6/18/2020    BLU: Resume CPAP.    Hypothyroidism: Resume PTA levothyroxine.    Chronic pain syndrome: Resume PTA duloxetine, gabapentin 300 mg at bedtime.  Acetaminophen and oxycodone as needed for acute pain.    MDD, anxiety:  resume PTA duloxetine 30 mg twice daily, escitalopram 20 mg daily, trazodone 50 mg at bedtime, and hydroxyzine as needed for anxiety.  She is also on modafinil for 100 mg daily which will be resumed.    Hypokalemia: Initial potassium 3.3.  -Potassium replacement protocol    GERD: Resume PTA omeprazole twice daily and Pepcid as needed.    Resume PTA estradiol vaginal cream and estradiol-norethindrone tablets daily.    DVT Prophylaxis: Enoxaparin (Lovenox) SQ  Code Status: Full Code  FEN: consis carb moderate, NS at 100 ml/hr  Discharge Dispo: home  Estimated Disch Date / # of Days until Disch: 1-3 days pending resolution of fevers        Interval History (Subjective):      Febrile overnight to 102  F.  Feeling a little bit better today.  Still has a moderate headache.  Myalgias are improved.  Left flank pain is mild to moderate and constant as it was yesterday,  Denies any hematuria.   But a little bit less intense.  Does not have any waxing and waning severe pain in that flank.  No nausea currently.                  Physical Exam:      Last Vital Signs:  /40 (BP Location: Right arm)   Pulse 65   Temp 99.3  F (37.4  C) (Oral)   Resp 16   Ht 1.626 m (5' 4\")   Wt 67.4 kg (148 lb 9.6 oz)   LMP 12/19/2013   SpO2 96%   BMI 25.51 kg/m        Intake/Output Summary (Last 24 hours) at 6/12/2020 1141  Last data " filed at 6/12/2020 0851  Gross per 24 hour   Intake 480 ml   Output --   Net 480 ml       Constitutional: Awake, NAD   Eyes: sclera white   HEENT:  MMM  Respiratory:  lungs cta bilaterally, no crackles or wheeze  Cardiovascular: RRR.  No murmur   GI: Mild left lower quadrant abdominal tenderness to palpation without guarding, bowel sounds present, not distended  Skin: no rash   Musculoskeletal/extremities: No edema  Neurologic: A&O  Psychiatric: calm, cooperative         Medications:      All current medications were reviewed with changes reflected in problem list.         Data:      All new lab and imaging data was reviewed.   Labs:  Recent Labs   Lab 06/11/20  0833 06/11/20  0646 06/11/20  0645   CULT >100,000 colonies/mL  Escherichia coli  *  Susceptibility testing in progress No growth after 19 hours No growth after 19 hours     Recent Labs   Lab 06/12/20  0631 06/11/20 2157 06/11/20  0646     --  139   POTASSIUM 4.2 4.6 3.3*   CHLORIDE 109  --  106   CO2 26  --  27   ANIONGAP 4  --  6   GLC 69*  --  123*   BUN 17  --  16   CR 0.93  --  0.90   GFRESTIMATED 68  --  71   GFRESTBLACK 79  --  82   VALARIE 8.3*  --  8.3*     Recent Labs   Lab 06/12/20  0631 06/11/20  0646   WBC 17.2* 17.0*   HGB 11.4* 11.7   HCT 36.6 36.6   MCV 96 94    299      Imaging:   None today      Nnamdi Ahumada MD         none

## 2024-01-13 ENCOUNTER — HEALTH MAINTENANCE LETTER (OUTPATIENT)
Age: 59
End: 2024-01-13

## 2024-01-13 DIAGNOSIS — K21.9 GASTROESOPHAGEAL REFLUX DISEASE WITHOUT ESOPHAGITIS: ICD-10-CM

## 2024-01-15 ENCOUNTER — MEDICAL CORRESPONDENCE (OUTPATIENT)
Dept: HEALTH INFORMATION MANAGEMENT | Facility: CLINIC | Age: 59
End: 2024-01-15
Payer: COMMERCIAL

## 2024-01-15 ENCOUNTER — PRE VISIT (OUTPATIENT)
Dept: MULTI SPECIALTY CLINIC | Facility: CLINIC | Age: 59
End: 2024-01-15
Payer: COMMERCIAL

## 2024-01-15 RX ORDER — OMEPRAZOLE 40 MG/1
40 CAPSULE, DELAYED RELEASE ORAL 2 TIMES DAILY
Qty: 180 CAPSULE | Refills: 0 | Status: SHIPPED | OUTPATIENT
Start: 2024-01-15 | End: 2024-04-12

## 2024-01-15 NOTE — TELEPHONE ENCOUNTER
Reason for Visit:  6 months Follow-up    Diagnosis: Nephrolithiasis, Hyperoxaluria    Litholink in: No, sent a Litholink to patient    Contact Patient: Jadyn Alonso MA  01/15/24  4:32 PM

## 2024-02-05 ENCOUNTER — TRANSFERRED RECORDS (OUTPATIENT)
Dept: HEALTH INFORMATION MANAGEMENT | Facility: CLINIC | Age: 59
End: 2024-02-05
Payer: COMMERCIAL

## 2024-02-12 ENCOUNTER — LAB (OUTPATIENT)
Dept: ONCOLOGY | Facility: CLINIC | Age: 59
End: 2024-02-12
Attending: INTERNAL MEDICINE
Payer: COMMERCIAL

## 2024-02-12 DIAGNOSIS — D50.8 OTHER IRON DEFICIENCY ANEMIA: ICD-10-CM

## 2024-02-12 LAB
BASOPHILS # BLD AUTO: 0.1 10E3/UL (ref 0–0.2)
BASOPHILS NFR BLD AUTO: 2 %
EOSINOPHIL # BLD AUTO: 0.1 10E3/UL (ref 0–0.7)
EOSINOPHIL NFR BLD AUTO: 2 %
ERYTHROCYTE [DISTWIDTH] IN BLOOD BY AUTOMATED COUNT: 13.9 % (ref 10–15)
FERRITIN SERPL-MCNC: 264 NG/ML (ref 11–328)
HCT VFR BLD AUTO: 40.5 % (ref 35–47)
HGB BLD-MCNC: 12.5 G/DL (ref 11.7–15.7)
IMM GRANULOCYTES # BLD: 0 10E3/UL
IMM GRANULOCYTES NFR BLD: 0 %
IRON BINDING CAPACITY (ROCHE): 217 UG/DL (ref 240–430)
IRON SATN MFR SERPL: 48 % (ref 15–46)
IRON SERPL-MCNC: 104 UG/DL (ref 37–145)
LYMPHOCYTES # BLD AUTO: 2 10E3/UL (ref 0.8–5.3)
LYMPHOCYTES NFR BLD AUTO: 31 %
MCH RBC QN AUTO: 29.6 PG (ref 26.5–33)
MCHC RBC AUTO-ENTMCNC: 30.9 G/DL (ref 31.5–36.5)
MCV RBC AUTO: 96 FL (ref 78–100)
MONOCYTES # BLD AUTO: 0.7 10E3/UL (ref 0–1.3)
MONOCYTES NFR BLD AUTO: 11 %
NEUTROPHILS # BLD AUTO: 3.5 10E3/UL (ref 1.6–8.3)
NEUTROPHILS NFR BLD AUTO: 54 %
NRBC # BLD AUTO: 0 10E3/UL
NRBC BLD AUTO-RTO: 0 /100
PLATELET # BLD AUTO: 357 10E3/UL (ref 150–450)
RBC # BLD AUTO: 4.23 10E6/UL (ref 3.8–5.2)
WBC # BLD AUTO: 6.5 10E3/UL (ref 4–11)

## 2024-02-12 PROCEDURE — 82728 ASSAY OF FERRITIN: CPT | Performed by: INTERNAL MEDICINE

## 2024-02-12 PROCEDURE — 36415 COLL VENOUS BLD VENIPUNCTURE: CPT

## 2024-02-12 PROCEDURE — 85025 COMPLETE CBC W/AUTO DIFF WBC: CPT | Performed by: INTERNAL MEDICINE

## 2024-02-12 PROCEDURE — 83550 IRON BINDING TEST: CPT | Performed by: INTERNAL MEDICINE

## 2024-02-12 NOTE — PROGRESS NOTES
Medical Assistant Note:  Lynn Thompson presents today for blood draw.    Patient seen by provider today: No.   present during visit today: Not Applicable.    Concerns: No Concerns.    Procedure:  Lab draw site: right antecub, Needle type: butterfly, Gauge: 23.    Post Assessment:  Labs drawn without difficulty: Yes.    Discharge Plan:  Departure Mode: Ambulatory.    Face to Face Time: 10.    Ruth Ann Garner

## 2024-02-13 ENCOUNTER — ONCOLOGY VISIT (OUTPATIENT)
Dept: ONCOLOGY | Facility: CLINIC | Age: 59
End: 2024-02-13
Attending: INTERNAL MEDICINE
Payer: COMMERCIAL

## 2024-02-13 VITALS
OXYGEN SATURATION: 99 % | HEIGHT: 64 IN | HEART RATE: 67 BPM | BODY MASS INDEX: 22.36 KG/M2 | WEIGHT: 131 LBS | DIASTOLIC BLOOD PRESSURE: 80 MMHG | SYSTOLIC BLOOD PRESSURE: 129 MMHG | RESPIRATION RATE: 16 BRPM

## 2024-02-13 DIAGNOSIS — D50.9 IRON DEFICIENCY ANEMIA, UNSPECIFIED IRON DEFICIENCY ANEMIA TYPE: Primary | ICD-10-CM

## 2024-02-13 PROCEDURE — 99214 OFFICE O/P EST MOD 30 MIN: CPT | Performed by: INTERNAL MEDICINE

## 2024-02-13 PROCEDURE — 99213 OFFICE O/P EST LOW 20 MIN: CPT | Performed by: INTERNAL MEDICINE

## 2024-02-13 ASSESSMENT — PAIN SCALES - GENERAL: PAINLEVEL: NO PAIN (0)

## 2024-02-13 NOTE — PROGRESS NOTES
Oncology/Hematology Visit Note  2024    Reason for Visit: Follow up of iron deficiency anemia       Hematologic History:  Lynn Thompson is a 58 year old female with PMHx of total pancreatectomy, islet cell autotransplant, splenectomy on 5/10/13, post pancreatectomy diabetes, gastric bypass in , surgery for small bowel obstruction, history of Crohn's disease s/p partial terminal ileum resection, who presents with anemia.        Lynn says that she has had anemia all of her life.  She was previously followed by Dr. Tom Malcolm at [facility] since .  She was followed and treated for iron-deficiency anemia.  It was felt that it was due to poor absorption from her gastric bypass and various bowel surgeries in the past.  She was unable to tolerate oral and and could not absorb it.  She gets IV iron intermittently.  She says a round of IV iron would last her for several years.  She last saw Dr. Malcolm in .       Patient has been anemic again.  She underwent colonoscopy on 21, which found some polyps that were removed.  Due to unintentional weight loss, she underwent mammogram that was negative.  She had a CT abdomen/pelvis on 3/1/21 that was normal, other than non-specific proximal colitis.  She does not smoke.       She received Injectafer x 2 doses in May 2021. Received Feraheme 2023.     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 Ongoing fatigue stable. Leg cramps okay right now. We discussed currently adequate iron.   With Feraheme in August she did have she did have significant body aches that improved with additional antihistamine.  Fosters for a local shelter     Review of Systems:  A complete review of systems was negative except as noted in the HPI.     Past medical, Surgical, and social history:  Reviewed    Physical Examination:  General: Pleasant patient in no acute distress. No pallor. Normal work of breathing. Oriented and alert.      Laboratory Data:  Labs ferritin 264  Hgb 12.5  Iron saturation at 48%        Assessment and Plan:  Lynn Thompson is a 59 year old female with history of multiple bowel surgeries and lifelong iron deficiency anemia.     1. Anemia : due to iron deficiency  2. History of Iron deficiency  Patient has long-standing history of iron deficiency anemia since age 9 months.  Last received IV Feraheme 8/2023. She had body aches with this and would benefit from pre-med Benadryl/Tylenol with future doses.      She is up to date on GI screenings, most recent colonoscopy with last 4/2021.  She has iron deficiency now.  History of gastric bypass.  Injectafer x2.      Labs every 3 months, see me in  a year. If ferritin <20 or iron sat <12% or drop in hgb iv iron    30  minutes spent on the date of the encounter doing chart review, review of test results, interpretation of tests, patient visit and documentation     Nico Montero MD  [facility]   [phone]

## 2024-02-13 NOTE — LETTER
"    2/13/2024         RE: Lynn Thompson  [address] [facility] [id number]        Dear Colleague,    Thank you for referring your patient, Lynn Thompson, to the [facility]. Please see a copy of my visit note below.    Oncology Rooming Note    February 13, 2024 1:42 PM   Lynn Thompson is a 59 year old female who presents for:    Chief Complaint   Patient presents with     Oncology Clinic Visit     Initial Vitals: /80   Pulse 67   Resp 16   Ht 1.626 m (5' 4\")   Wt 59.4 kg (131 lb)   LMP 12/19/2013   SpO2 99%   BMI 22.49 kg/m   Estimated body mass index is 22.49 kg/m  as calculated from the following:    Height as of this encounter: 1.626 m (5' 4\").    Weight as of this encounter: 59.4 kg (131 lb). Body surface area is 1.64 meters squared.  No Pain (0) Comment: Data Unavailable   Patient's last menstrual period was 12/19/2013.  Allergies reviewed: Yes  Medications reviewed: Yes    Medications: Medication refills not needed today.  Pharmacy name entered into [employer]:    [id number] - [place], [place] - [address] [address] [facility] - [place], [place] - [address]    Frailty Screening:   Is the patient here for a new oncology consult visit in cancer care? 2. No      Clinical concerns: None       Jessica Wilder MA              Oncology/Hematology Visit Note  Feb 13, 2024    Reason for Visit: Follow up of iron deficiency anemia       Hematologic History:  Lynn Thompson is a 58 year old female with PMHx of total pancreatectomy, islet cell autotransplant, splenectomy on 5/10/13, post pancreatectomy diabetes, gastric bypass in 2001, surgery for small bowel obstruction, history of Crohn's disease s/p partial terminal ileum resection, who presents with anemia.        Lynn says that she has had anemia all of her life.  She was previously followed by Dr. Tom Malcolm at [facility] since 2004.  She was followed and treated " for iron-deficiency anemia.  It was felt that it was due to poor absorption from her gastric bypass and various bowel surgeries in the past.  She was unable to tolerate oral and and could not absorb it.  She gets IV iron intermittently.  She says a round of IV iron would last her for several years.  She last saw Dr. Malcolm in .       Patient has been anemic again.  She underwent colonoscopy on 21, which found some polyps that were removed.  Due to unintentional weight loss, she underwent mammogram that was negative.  She had a CT abdomen/pelvis on 3/1/21 that was normal, other than non-specific proximal colitis.  She does not smoke.       She received Injectafer x 2 doses in May 2021. Received Feraheme 2023.     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 Ongoing fatigue stable. Leg cramps okay right now. We discussed currently adequate iron.   With Feraheme in August she did have she did have significant body aches that improved with additional antihistamine.     Review of Systems:  A complete review of systems was negative except as noted in the HPI.     Past medical, Surgical, and social history:  Reviewed    Physical Examination:  General: Pleasant patient in no acute distress. No pallor. Normal work of breathing. Oriented and alert.     Laboratory Data:  Labs ferritin 264  Hgb 12.5  Iron saturation at 48%        Assessment and Plan:  Lynn Thompson is a 59 year old female with history of multiple bowel surgeries and lifelong iron deficiency anemia.     1. Anemia : due to iron deficiency  2. History of Iron deficiency  Patient has long-standing history of iron deficiency anemia since age 9 months.  Last received IV Feraheme 2023. She had body aches with this and would benefit from pre-med Benadryl/Tylenol with future doses.      She is up to date on GI screenings, most recent colonoscopy with last 2021.  She has iron deficiency now.  History of gastric  bypass.  Injectafer x2.      Labs every 3 months, see me in  a year. If ferritin <20 or iron sat <12% or drop in hgb iv iron    30  minutes spent on the date of the encounter doing chart review, review of test results, interpretation of tests, patient visit and documentation     Nico Montero MD  [facility]   [phone]     1 year follow up with labs , schedule labs in [facility] q 4 months, [facility]        Again, thank you for allowing me to participate in the care of your patient.        Sincerely,        Nico Montero MD

## 2024-02-13 NOTE — PROGRESS NOTES
"Oncology Rooming Note    February 13, 2024 1:42 PM   Lynn Thompson is a 59 year old female who presents for:    Chief Complaint   Patient presents with    Oncology Clinic Visit     Initial Vitals: /80   Pulse 67   Resp 16   Ht 1.626 m (5' 4\")   Wt 59.4 kg (131 lb)   LMP 12/19/2013   SpO2 99%   BMI 22.49 kg/m   Estimated body mass index is 22.49 kg/m  as calculated from the following:    Height as of this encounter: 1.626 m (5' 4\").    Weight as of this encounter: 59.4 kg (131 lb). Body surface area is 1.64 meters squared.  No Pain (0) Comment: Data Unavailable   Patient's last menstrual period was 12/19/2013.  Allergies reviewed: Yes  Medications reviewed: Yes    Medications: Medication refills not needed today.  Pharmacy name entered into [employer]:    [id number] - [place], [place] - [address] [address] [facility] - [place], [place] - [address]    Frailty Screening:   Is the patient here for a new oncology consult visit in cancer care? 2. No      Clinical concerns: None       Jessica Wilder MA            "

## 2024-02-21 ENCOUNTER — MEDICAL CORRESPONDENCE (OUTPATIENT)
Dept: HEALTH INFORMATION MANAGEMENT | Facility: CLINIC | Age: 59
End: 2024-02-21
Payer: COMMERCIAL

## 2024-02-21 ENCOUNTER — TELEPHONE (OUTPATIENT)
Dept: MULTI SPECIALTY CLINIC | Facility: CLINIC | Age: 59
End: 2024-02-21
Payer: COMMERCIAL

## 2024-02-21 NOTE — TELEPHONE ENCOUNTER
Left a detail massage to Lynn about the Litholink Results came back, with no specimen. Already send out another Litholink, and will have the Schedule coordinator's to reschedule the appointment.     Buddy GREER  Non-Medical   Urology Visit Facilitator    02/21/24 4:19 PM

## 2024-03-03 ENCOUNTER — APPOINTMENT (OUTPATIENT)
Dept: CT IMAGING | Facility: CLINIC | Age: 59
End: 2024-03-03
Attending: EMERGENCY MEDICINE
Payer: COMMERCIAL

## 2024-03-03 ENCOUNTER — HOSPITAL ENCOUNTER (INPATIENT)
Facility: CLINIC | Age: 59
LOS: 2 days | Discharge: HOME | End: 2024-03-06
Attending: EMERGENCY MEDICINE | Admitting: INTERNAL MEDICINE
Payer: COMMERCIAL

## 2024-03-03 DIAGNOSIS — R73.9 HYPERGLYCEMIA: ICD-10-CM

## 2024-03-03 DIAGNOSIS — R10.9 ABDOMINAL PAIN, UNSPECIFIED ABDOMINAL LOCATION: ICD-10-CM

## 2024-03-03 DIAGNOSIS — R74.01 TRANSAMINITIS: ICD-10-CM

## 2024-03-03 LAB
ALBUMIN SERPL BCG-MCNC: 4.4 G/DL (ref 3.5–5.2)
ALBUMIN UR-MCNC: NEGATIVE MG/DL
ALP SERPL-CCNC: 268 U/L (ref 40–150)
ALT SERPL W P-5'-P-CCNC: 235 U/L (ref 0–50)
ANION GAP SERPL CALCULATED.3IONS-SCNC: 9 MMOL/L (ref 7–15)
APAP SERPL-MCNC: <5 UG/ML (ref 10–30)
APPEARANCE UR: CLEAR
AST SERPL W P-5'-P-CCNC: 770 U/L (ref 0–45)
BASOPHILS # BLD AUTO: 0.1 10E3/UL (ref 0–0.2)
BASOPHILS NFR BLD AUTO: 1 %
BILIRUB SERPL-MCNC: 0.4 MG/DL
BILIRUB UR QL STRIP: NEGATIVE
BUN SERPL-MCNC: 21.9 MG/DL (ref 8–23)
CALCIUM SERPL-MCNC: 9.7 MG/DL (ref 8.8–10.2)
CHLORIDE SERPL-SCNC: 105 MMOL/L (ref 98–107)
COLOR UR AUTO: ABNORMAL
CREAT SERPL-MCNC: 0.98 MG/DL (ref 0.51–0.95)
DEPRECATED HCO3 PLAS-SCNC: 26 MMOL/L (ref 22–29)
EGFRCR SERPLBLD CKD-EPI 2021: 65 ML/MIN/1.73M2
EOSINOPHIL # BLD AUTO: 0.1 10E3/UL (ref 0–0.7)
EOSINOPHIL NFR BLD AUTO: 2 %
ERYTHROCYTE [DISTWIDTH] IN BLOOD BY AUTOMATED COUNT: 14.5 % (ref 10–15)
GLUCOSE SERPL-MCNC: 278 MG/DL (ref 70–99)
GLUCOSE UR STRIP-MCNC: 100 MG/DL
HCT VFR BLD AUTO: 40.2 % (ref 35–47)
HGB BLD-MCNC: 12.7 G/DL (ref 11.7–15.7)
HGB UR QL STRIP: ABNORMAL
HOLD SPECIMEN: NORMAL
HOLD SPECIMEN: NORMAL
IMM GRANULOCYTES # BLD: 0 10E3/UL
IMM GRANULOCYTES NFR BLD: 0 %
KETONES UR STRIP-MCNC: NEGATIVE MG/DL
LACTATE SERPL-SCNC: 1.6 MMOL/L (ref 0.7–2)
LEUKOCYTE ESTERASE UR QL STRIP: NEGATIVE
LIPASE SERPL-CCNC: 6 U/L (ref 13–60)
LYMPHOCYTES # BLD AUTO: 1.7 10E3/UL (ref 0.8–5.3)
LYMPHOCYTES NFR BLD AUTO: 28 %
MCH RBC QN AUTO: 29.9 PG (ref 26.5–33)
MCHC RBC AUTO-ENTMCNC: 31.6 G/DL (ref 31.5–36.5)
MCV RBC AUTO: 95 FL (ref 78–100)
MONOCYTES # BLD AUTO: 0.8 10E3/UL (ref 0–1.3)
MONOCYTES NFR BLD AUTO: 13 %
MUCOUS THREADS #/AREA URNS LPF: PRESENT /LPF
NEUTROPHILS # BLD AUTO: 3.5 10E3/UL (ref 1.6–8.3)
NEUTROPHILS NFR BLD AUTO: 56 %
NITRATE UR QL: NEGATIVE
NRBC # BLD AUTO: 0 10E3/UL
NRBC BLD AUTO-RTO: 0 /100
PH UR STRIP: 5.5 [PH] (ref 5–7)
PLATELET # BLD AUTO: 346 10E3/UL (ref 150–450)
POTASSIUM SERPL-SCNC: 4.7 MMOL/L (ref 3.4–5.3)
PROT SERPL-MCNC: 7.3 G/DL (ref 6.4–8.3)
RBC # BLD AUTO: 4.25 10E6/UL (ref 3.8–5.2)
RBC URINE: 26 /HPF
SODIUM SERPL-SCNC: 140 MMOL/L (ref 135–145)
SP GR UR STRIP: 1.01 (ref 1–1.03)
SQUAMOUS EPITHELIAL: <1 /HPF
UROBILINOGEN UR STRIP-MCNC: NORMAL MG/DL
WBC # BLD AUTO: 6.2 10E3/UL (ref 4–11)
WBC URINE: 1 /HPF

## 2024-03-03 PROCEDURE — 82040 ASSAY OF SERUM ALBUMIN: CPT | Performed by: EMERGENCY MEDICINE

## 2024-03-03 PROCEDURE — 83690 ASSAY OF LIPASE: CPT | Performed by: EMERGENCY MEDICINE

## 2024-03-03 PROCEDURE — 87637 SARSCOV2&INF A&B&RSV AMP PRB: CPT | Performed by: EMERGENCY MEDICINE

## 2024-03-03 PROCEDURE — 85610 PROTHROMBIN TIME: CPT | Performed by: EMERGENCY MEDICINE

## 2024-03-03 PROCEDURE — 81001 URINALYSIS AUTO W/SCOPE: CPT | Performed by: EMERGENCY MEDICINE

## 2024-03-03 PROCEDURE — 36415 COLL VENOUS BLD VENIPUNCTURE: CPT | Performed by: EMERGENCY MEDICINE

## 2024-03-03 PROCEDURE — 80143 DRUG ASSAY ACETAMINOPHEN: CPT | Performed by: EMERGENCY MEDICINE

## 2024-03-03 PROCEDURE — 96361 HYDRATE IV INFUSION ADD-ON: CPT

## 2024-03-03 PROCEDURE — 250N000011 HC RX IP 250 OP 636: Performed by: EMERGENCY MEDICINE

## 2024-03-03 PROCEDURE — 96376 TX/PRO/DX INJ SAME DRUG ADON: CPT

## 2024-03-03 PROCEDURE — 83605 ASSAY OF LACTIC ACID: CPT | Performed by: EMERGENCY MEDICINE

## 2024-03-03 PROCEDURE — 258N000003 HC RX IP 258 OP 636: Performed by: EMERGENCY MEDICINE

## 2024-03-03 PROCEDURE — 83036 HEMOGLOBIN GLYCOSYLATED A1C: CPT | Performed by: INTERNAL MEDICINE

## 2024-03-03 PROCEDURE — 83735 ASSAY OF MAGNESIUM: CPT | Performed by: INTERNAL MEDICINE

## 2024-03-03 PROCEDURE — 96375 TX/PRO/DX INJ NEW DRUG ADDON: CPT

## 2024-03-03 PROCEDURE — 85004 AUTOMATED DIFF WBC COUNT: CPT | Performed by: EMERGENCY MEDICINE

## 2024-03-03 PROCEDURE — 99285 EMERGENCY DEPT VISIT HI MDM: CPT | Mod: 25

## 2024-03-03 PROCEDURE — 96374 THER/PROPH/DIAG INJ IV PUSH: CPT | Mod: 59

## 2024-03-03 PROCEDURE — 74177 CT ABD & PELVIS W/CONTRAST: CPT | Mod: MG

## 2024-03-03 RX ORDER — ONDANSETRON 2 MG/ML
4 INJECTION INTRAMUSCULAR; INTRAVENOUS ONCE
Status: COMPLETED | OUTPATIENT
Start: 2024-03-03 | End: 2024-03-03

## 2024-03-03 RX ORDER — IOPAMIDOL 755 MG/ML
500 INJECTION, SOLUTION INTRAVASCULAR ONCE
Status: COMPLETED | OUTPATIENT
Start: 2024-03-03 | End: 2024-03-03

## 2024-03-03 RX ORDER — HYDROMORPHONE HYDROCHLORIDE 1 MG/ML
0.5 INJECTION, SOLUTION INTRAMUSCULAR; INTRAVENOUS; SUBCUTANEOUS ONCE
Status: COMPLETED | OUTPATIENT
Start: 2024-03-03 | End: 2024-03-03

## 2024-03-03 RX ADMIN — IOPAMIDOL 65 ML: 755 INJECTION, SOLUTION INTRAVENOUS at 23:00

## 2024-03-03 RX ADMIN — HYDROMORPHONE HYDROCHLORIDE 0.5 MG: 1 INJECTION, SOLUTION INTRAMUSCULAR; INTRAVENOUS; SUBCUTANEOUS at 23:22

## 2024-03-03 RX ADMIN — SODIUM CHLORIDE 1000 ML: 9 INJECTION, SOLUTION INTRAVENOUS at 22:30

## 2024-03-03 RX ADMIN — ONDANSETRON 4 MG: 2 INJECTION INTRAMUSCULAR; INTRAVENOUS at 22:31

## 2024-03-03 RX ADMIN — FAMOTIDINE 20 MG: 10 INJECTION, SOLUTION INTRAVENOUS at 22:31

## 2024-03-03 RX ADMIN — HYDROMORPHONE HYDROCHLORIDE 0.5 MG: 1 INJECTION, SOLUTION INTRAMUSCULAR; INTRAVENOUS; SUBCUTANEOUS at 22:31

## 2024-03-03 ASSESSMENT — COLUMBIA-SUICIDE SEVERITY RATING SCALE - C-SSRS
2. HAVE YOU ACTUALLY HAD ANY THOUGHTS OF KILLING YOURSELF IN THE PAST MONTH?: NO
1. IN THE PAST MONTH, HAVE YOU WISHED YOU WERE DEAD OR WISHED YOU COULD GO TO SLEEP AND NOT WAKE UP?: NO
6. HAVE YOU EVER DONE ANYTHING, STARTED TO DO ANYTHING, OR PREPARED TO DO ANYTHING TO END YOUR LIFE?: NO

## 2024-03-03 ASSESSMENT — ACTIVITIES OF DAILY LIVING (ADL)
ADLS_ACUITY_SCORE: 37
ADLS_ACUITY_SCORE: 37

## 2024-03-04 ENCOUNTER — PATIENT OUTREACH (OUTPATIENT)
Dept: GASTROENTEROLOGY | Facility: CLINIC | Age: 59
End: 2024-03-04
Payer: COMMERCIAL

## 2024-03-04 ENCOUNTER — APPOINTMENT (OUTPATIENT)
Dept: ULTRASOUND IMAGING | Facility: CLINIC | Age: 59
End: 2024-03-04
Attending: EMERGENCY MEDICINE
Payer: COMMERCIAL

## 2024-03-04 PROBLEM — R74.01 TRANSAMINITIS: Status: ACTIVE | Noted: 2024-03-04

## 2024-03-04 PROBLEM — R10.9 ABDOMINAL PAIN, UNSPECIFIED ABDOMINAL LOCATION: Status: ACTIVE | Noted: 2024-03-04

## 2024-03-04 LAB
ADV 40+41 DNA STL QL NAA+NON-PROBE: NEGATIVE
ALBUMIN SERPL BCG-MCNC: 3.6 G/DL (ref 3.5–5.2)
ALP SERPL-CCNC: 207 U/L (ref 40–150)
ALT SERPL W P-5'-P-CCNC: 158 U/L (ref 0–50)
ANION GAP SERPL CALCULATED.3IONS-SCNC: 6 MMOL/L (ref 7–15)
AST SERPL W P-5'-P-CCNC: 216 U/L (ref 0–45)
ASTRO TYP 1-8 RNA STL QL NAA+NON-PROBE: NEGATIVE
BILIRUB DIRECT SERPL-MCNC: <0.2 MG/DL (ref 0–0.3)
BILIRUB SERPL-MCNC: 0.3 MG/DL
BUN SERPL-MCNC: 18.5 MG/DL (ref 8–23)
C CAYETANENSIS DNA STL QL NAA+NON-PROBE: NEGATIVE
C DIFF TOX B STL QL: NEGATIVE
C PNEUM DNA SPEC QL NAA+PROBE: NOT DETECTED
CALCIUM SERPL-MCNC: 8.7 MG/DL (ref 8.8–10.2)
CAMPYLOBACTER DNA SPEC NAA+PROBE: NEGATIVE
CHLORIDE SERPL-SCNC: 107 MMOL/L (ref 98–107)
CREAT SERPL-MCNC: 0.88 MG/DL (ref 0.51–0.95)
CRYPTOSP DNA STL QL NAA+NON-PROBE: NEGATIVE
DEPRECATED HCO3 PLAS-SCNC: 27 MMOL/L (ref 22–29)
E COLI O157 DNA STL QL NAA+NON-PROBE: ABNORMAL
E HISTOLYT DNA STL QL NAA+NON-PROBE: NEGATIVE
EAEC ASTA GENE ISLT QL NAA+PROBE: NEGATIVE
EC STX1+STX2 GENES STL QL NAA+NON-PROBE: NEGATIVE
EGFRCR SERPLBLD CKD-EPI 2021: 74 ML/MIN/1.73M2
EPEC EAE GENE STL QL NAA+NON-PROBE: POSITIVE
ERYTHROCYTE [DISTWIDTH] IN BLOOD BY AUTOMATED COUNT: 14.6 % (ref 10–15)
ETEC LTA+ST1A+ST1B TOX ST NAA+NON-PROBE: NEGATIVE
FLUAV H1 2009 PAND RNA SPEC QL NAA+PROBE: NOT DETECTED
FLUAV H1 RNA SPEC QL NAA+PROBE: NOT DETECTED
FLUAV H3 RNA SPEC QL NAA+PROBE: NOT DETECTED
FLUAV RNA SPEC QL NAA+PROBE: NEGATIVE
FLUAV RNA SPEC QL NAA+PROBE: NEGATIVE
FLUAV RNA SPEC QL NAA+PROBE: NOT DETECTED
FLUBV RNA RESP QL NAA+PROBE: NEGATIVE
FLUBV RNA RESP QL NAA+PROBE: NEGATIVE
FLUBV RNA SPEC QL NAA+PROBE: NOT DETECTED
G LAMBLIA DNA STL QL NAA+NON-PROBE: NEGATIVE
GLUCOSE BLDC GLUCOMTR-MCNC: 105 MG/DL (ref 70–99)
GLUCOSE BLDC GLUCOMTR-MCNC: 70 MG/DL (ref 70–99)
GLUCOSE BLDC GLUCOMTR-MCNC: 72 MG/DL (ref 70–99)
GLUCOSE BLDC GLUCOMTR-MCNC: 78 MG/DL (ref 70–99)
GLUCOSE BLDC GLUCOMTR-MCNC: 81 MG/DL (ref 70–99)
GLUCOSE BLDC GLUCOMTR-MCNC: 84 MG/DL (ref 70–99)
GLUCOSE BLDC GLUCOMTR-MCNC: 85 MG/DL (ref 70–99)
GLUCOSE BLDC GLUCOMTR-MCNC: 94 MG/DL (ref 70–99)
GLUCOSE BLDC GLUCOMTR-MCNC: 96 MG/DL (ref 70–99)
GLUCOSE SERPL-MCNC: 114 MG/DL (ref 70–99)
HADV DNA SPEC QL NAA+PROBE: NOT DETECTED
HBA1C MFR BLD: 6.7 %
HCOV PNL SPEC NAA+PROBE: NOT DETECTED
HCT VFR BLD AUTO: 36.4 % (ref 35–47)
HGB BLD-MCNC: 11.5 G/DL (ref 11.7–15.7)
HMPV RNA SPEC QL NAA+PROBE: NOT DETECTED
HPIV1 RNA SPEC QL NAA+PROBE: NOT DETECTED
HPIV2 RNA SPEC QL NAA+PROBE: NOT DETECTED
HPIV3 RNA SPEC QL NAA+PROBE: NOT DETECTED
HPIV4 RNA SPEC QL NAA+PROBE: NOT DETECTED
INR PPP: 0.97 (ref 0.85–1.15)
M PNEUMO DNA SPEC QL NAA+PROBE: NOT DETECTED
MAGNESIUM SERPL-MCNC: 1.5 MG/DL (ref 1.7–2.3)
MAGNESIUM SERPL-MCNC: 1.8 MG/DL (ref 1.7–2.3)
MAGNESIUM SERPL-MCNC: 2.7 MG/DL (ref 1.7–2.3)
MCH RBC QN AUTO: 29.6 PG (ref 26.5–33)
MCHC RBC AUTO-ENTMCNC: 31.6 G/DL (ref 31.5–36.5)
MCV RBC AUTO: 94 FL (ref 78–100)
NOROVIRUS GI+II RNA STL QL NAA+NON-PROBE: POSITIVE
P SHIGELLOIDES DNA STL QL NAA+NON-PROBE: NEGATIVE
PLATELET # BLD AUTO: 298 10E3/UL (ref 150–450)
POTASSIUM SERPL-SCNC: 5 MMOL/L (ref 3.4–5.3)
PROCALCITONIN SERPL IA-MCNC: 0.18 NG/ML
PROT SERPL-MCNC: 6 G/DL (ref 6.4–8.3)
RBC # BLD AUTO: 3.88 10E6/UL (ref 3.8–5.2)
RSV RNA SPEC NAA+PROBE: NEGATIVE
RSV RNA SPEC NAA+PROBE: NEGATIVE
RSV RNA SPEC QL NAA+PROBE: NOT DETECTED
RSV RNA SPEC QL NAA+PROBE: NOT DETECTED
RV+EV RNA SPEC QL NAA+PROBE: NOT DETECTED
RVA RNA STL QL NAA+NON-PROBE: NEGATIVE
SALMONELLA SP RPOD STL QL NAA+PROBE: NEGATIVE
SAPO I+II+IV+V RNA STL QL NAA+NON-PROBE: NEGATIVE
SARS-COV-2 RNA RESP QL NAA+PROBE: NEGATIVE
SARS-COV-2 RNA RESP QL NAA+PROBE: NEGATIVE
SHIGELLA SP+EIEC IPAH ST NAA+NON-PROBE: NEGATIVE
SODIUM SERPL-SCNC: 140 MMOL/L (ref 135–145)
V CHOLERAE DNA SPEC QL NAA+PROBE: NEGATIVE
VIBRIO DNA SPEC NAA+PROBE: NEGATIVE
WBC # BLD AUTO: 11.5 10E3/UL (ref 4–11)
Y ENTEROCOL DNA STL QL NAA+PROBE: NEGATIVE

## 2024-03-04 PROCEDURE — 36415 COLL VENOUS BLD VENIPUNCTURE: CPT | Performed by: INTERNAL MEDICINE

## 2024-03-04 PROCEDURE — 96376 TX/PRO/DX INJ SAME DRUG ADON: CPT

## 2024-03-04 PROCEDURE — 250N000011 HC RX IP 250 OP 636: Performed by: INTERNAL MEDICINE

## 2024-03-04 PROCEDURE — 83735 ASSAY OF MAGNESIUM: CPT | Performed by: EMERGENCY MEDICINE

## 2024-03-04 PROCEDURE — 96366 THER/PROPH/DIAG IV INF ADDON: CPT

## 2024-03-04 PROCEDURE — 96361 HYDRATE IV INFUSION ADD-ON: CPT

## 2024-03-04 PROCEDURE — 87493 C DIFF AMPLIFIED PROBE: CPT | Performed by: INTERNAL MEDICINE

## 2024-03-04 PROCEDURE — 87507 IADNA-DNA/RNA PROBE TQ 12-25: CPT | Performed by: INTERNAL MEDICINE

## 2024-03-04 PROCEDURE — C9113 INJ PANTOPRAZOLE SODIUM, VIA: HCPCS | Performed by: INTERNAL MEDICINE

## 2024-03-04 PROCEDURE — G0378 HOSPITAL OBSERVATION PER HR: HCPCS

## 2024-03-04 PROCEDURE — 87040 BLOOD CULTURE FOR BACTERIA: CPT | Performed by: INTERNAL MEDICINE

## 2024-03-04 PROCEDURE — 84145 PROCALCITONIN (PCT): CPT | Performed by: INTERNAL MEDICINE

## 2024-03-04 PROCEDURE — 258N000003 HC RX IP 258 OP 636: Performed by: INTERNAL MEDICINE

## 2024-03-04 PROCEDURE — 99207 PR APP CREDIT; MD BILLING SHARED VISIT: CPT | Performed by: INTERNAL MEDICINE

## 2024-03-04 PROCEDURE — 87637 SARSCOV2&INF A&B&RSV AMP PRB: CPT | Performed by: INTERNAL MEDICINE

## 2024-03-04 PROCEDURE — 85027 COMPLETE CBC AUTOMATED: CPT | Performed by: INTERNAL MEDICINE

## 2024-03-04 PROCEDURE — 80053 COMPREHEN METABOLIC PANEL: CPT | Performed by: INTERNAL MEDICINE

## 2024-03-04 PROCEDURE — 76705 ECHO EXAM OF ABDOMEN: CPT

## 2024-03-04 PROCEDURE — 250N000013 HC RX MED GY IP 250 OP 250 PS 637: Performed by: INTERNAL MEDICINE

## 2024-03-04 PROCEDURE — 82962 GLUCOSE BLOOD TEST: CPT

## 2024-03-04 PROCEDURE — 83735 ASSAY OF MAGNESIUM: CPT | Performed by: INTERNAL MEDICINE

## 2024-03-04 PROCEDURE — 96365 THER/PROPH/DIAG IV INF INIT: CPT

## 2024-03-04 PROCEDURE — 250N000013 HC RX MED GY IP 250 OP 250 PS 637: Performed by: EMERGENCY MEDICINE

## 2024-03-04 PROCEDURE — 120N000001 HC R&B MED SURG/OB

## 2024-03-04 PROCEDURE — 82248 BILIRUBIN DIRECT: CPT | Performed by: INTERNAL MEDICINE

## 2024-03-04 PROCEDURE — 99222 1ST HOSP IP/OBS MODERATE 55: CPT | Performed by: INTERNAL MEDICINE

## 2024-03-04 PROCEDURE — 87581 M.PNEUMON DNA AMP PROBE: CPT | Performed by: INTERNAL MEDICINE

## 2024-03-04 PROCEDURE — 96375 TX/PRO/DX INJ NEW DRUG ADDON: CPT

## 2024-03-04 RX ORDER — DEXTROSE MONOHYDRATE 25 G/50ML
25-50 INJECTION, SOLUTION INTRAVENOUS
Status: DISCONTINUED | OUTPATIENT
Start: 2024-03-04 | End: 2024-03-06 | Stop reason: HOSPADM

## 2024-03-04 RX ORDER — OXYCODONE HYDROCHLORIDE 5 MG/1
5 TABLET ORAL ONCE
Status: COMPLETED | OUTPATIENT
Start: 2024-03-04 | End: 2024-03-04

## 2024-03-04 RX ORDER — LEVOTHYROXINE SODIUM 75 UG/1
150 TABLET ORAL DAILY
Status: DISCONTINUED | OUTPATIENT
Start: 2024-03-04 | End: 2024-03-06 | Stop reason: HOSPADM

## 2024-03-04 RX ORDER — ESCITALOPRAM OXALATE 10 MG/1
20 TABLET ORAL DAILY
Status: DISCONTINUED | OUTPATIENT
Start: 2024-03-04 | End: 2024-03-06 | Stop reason: HOSPADM

## 2024-03-04 RX ORDER — NALOXONE HYDROCHLORIDE 0.4 MG/ML
0.4 INJECTION, SOLUTION INTRAMUSCULAR; INTRAVENOUS; SUBCUTANEOUS
Status: DISCONTINUED | OUTPATIENT
Start: 2024-03-04 | End: 2024-03-06 | Stop reason: HOSPADM

## 2024-03-04 RX ORDER — POTASSIUM CITRATE 10 MEQ/1
10 TABLET, EXTENDED RELEASE ORAL 2 TIMES DAILY WITH MEALS
COMMUNITY

## 2024-03-04 RX ORDER — HYDROXYZINE HYDROCHLORIDE 25 MG/1
25 TABLET, FILM COATED ORAL DAILY PRN
Status: DISCONTINUED | OUTPATIENT
Start: 2024-03-04 | End: 2024-03-06 | Stop reason: HOSPADM

## 2024-03-04 RX ORDER — HYDRALAZINE HYDROCHLORIDE 20 MG/ML
10 INJECTION INTRAMUSCULAR; INTRAVENOUS EVERY 4 HOURS PRN
Status: DISCONTINUED | OUTPATIENT
Start: 2024-03-04 | End: 2024-03-06 | Stop reason: HOSPADM

## 2024-03-04 RX ORDER — DULOXETIN HYDROCHLORIDE 20 MG/1
20 CAPSULE, DELAYED RELEASE ORAL DAILY
Status: DISCONTINUED | OUTPATIENT
Start: 2024-03-04 | End: 2024-03-06 | Stop reason: HOSPADM

## 2024-03-04 RX ORDER — HYDRALAZINE HYDROCHLORIDE 10 MG/1
10 TABLET, FILM COATED ORAL EVERY 4 HOURS PRN
Status: DISCONTINUED | OUTPATIENT
Start: 2024-03-04 | End: 2024-03-06 | Stop reason: HOSPADM

## 2024-03-04 RX ORDER — GABAPENTIN 300 MG/1
300 CAPSULE ORAL
Status: DISCONTINUED | OUTPATIENT
Start: 2024-03-04 | End: 2024-03-06 | Stop reason: HOSPADM

## 2024-03-04 RX ORDER — NALOXONE HYDROCHLORIDE 0.4 MG/ML
0.2 INJECTION, SOLUTION INTRAMUSCULAR; INTRAVENOUS; SUBCUTANEOUS
Status: DISCONTINUED | OUTPATIENT
Start: 2024-03-04 | End: 2024-03-06 | Stop reason: HOSPADM

## 2024-03-04 RX ORDER — MAGNESIUM SULFATE HEPTAHYDRATE 40 MG/ML
4 INJECTION, SOLUTION INTRAVENOUS ONCE
Status: COMPLETED | OUTPATIENT
Start: 2024-03-04 | End: 2024-03-04

## 2024-03-04 RX ORDER — ACETAMINOPHEN 325 MG/1
650 TABLET ORAL EVERY 6 HOURS PRN
Status: DISCONTINUED | OUTPATIENT
Start: 2024-03-04 | End: 2024-03-06 | Stop reason: HOSPADM

## 2024-03-04 RX ORDER — POTASSIUM CITRATE 10 MEQ/1
10 TABLET, EXTENDED RELEASE ORAL 2 TIMES DAILY WITH MEALS
Status: DISCONTINUED | OUTPATIENT
Start: 2024-03-04 | End: 2024-03-06 | Stop reason: HOSPADM

## 2024-03-04 RX ORDER — LIDOCAINE 40 MG/G
CREAM TOPICAL
Status: DISCONTINUED | OUTPATIENT
Start: 2024-03-04 | End: 2024-03-06 | Stop reason: HOSPADM

## 2024-03-04 RX ORDER — TRAZODONE HYDROCHLORIDE 50 MG/1
50 TABLET, FILM COATED ORAL AT BEDTIME
Status: DISCONTINUED | OUTPATIENT
Start: 2024-03-04 | End: 2024-03-06 | Stop reason: HOSPADM

## 2024-03-04 RX ORDER — LORATADINE 10 MG/1
10 TABLET ORAL DAILY PRN
Status: DISCONTINUED | OUTPATIENT
Start: 2024-03-04 | End: 2024-03-06 | Stop reason: HOSPADM

## 2024-03-04 RX ORDER — SODIUM CHLORIDE 9 MG/ML
INJECTION, SOLUTION INTRAVENOUS CONTINUOUS
Status: DISCONTINUED | OUTPATIENT
Start: 2024-03-04 | End: 2024-03-05

## 2024-03-04 RX ORDER — NICOTINE POLACRILEX 4 MG
15-30 LOZENGE BUCCAL
Status: DISCONTINUED | OUTPATIENT
Start: 2024-03-04 | End: 2024-03-06 | Stop reason: HOSPADM

## 2024-03-04 RX ORDER — FAMOTIDINE 20 MG/1
40 TABLET, FILM COATED ORAL 2 TIMES DAILY PRN
Status: DISCONTINUED | OUTPATIENT
Start: 2024-03-04 | End: 2024-03-06 | Stop reason: HOSPADM

## 2024-03-04 RX ORDER — HYDROMORPHONE HYDROCHLORIDE 1 MG/ML
0.5 INJECTION, SOLUTION INTRAMUSCULAR; INTRAVENOUS; SUBCUTANEOUS
Status: DISCONTINUED | OUTPATIENT
Start: 2024-03-04 | End: 2024-03-06 | Stop reason: HOSPADM

## 2024-03-04 RX ADMIN — ACETAMINOPHEN 650 MG: 325 TABLET, FILM COATED ORAL at 17:23

## 2024-03-04 RX ADMIN — HYDROMORPHONE HYDROCHLORIDE 0.5 MG: 1 INJECTION, SOLUTION INTRAMUSCULAR; INTRAVENOUS; SUBCUTANEOUS at 08:04

## 2024-03-04 RX ADMIN — ACETAMINOPHEN 650 MG: 325 TABLET, FILM COATED ORAL at 10:13

## 2024-03-04 RX ADMIN — PANCRELIPASE 2 TABLET: 10440; 39150; 39150 TABLET ORAL at 19:46

## 2024-03-04 RX ADMIN — HYDROMORPHONE HYDROCHLORIDE 0.5 MG: 1 INJECTION, SOLUTION INTRAMUSCULAR; INTRAVENOUS; SUBCUTANEOUS at 15:19

## 2024-03-04 RX ADMIN — SODIUM CHLORIDE: 9 INJECTION, SOLUTION INTRAVENOUS at 22:06

## 2024-03-04 RX ADMIN — MAGNESIUM SULFATE HEPTAHYDRATE 4 G: 40 INJECTION, SOLUTION INTRAVENOUS at 09:47

## 2024-03-04 RX ADMIN — HYDROMORPHONE HYDROCHLORIDE 0.5 MG: 1 INJECTION, SOLUTION INTRAMUSCULAR; INTRAVENOUS; SUBCUTANEOUS at 22:47

## 2024-03-04 RX ADMIN — POTASSIUM CITRATE 10 MEQ: 10 TABLET, EXTENDED RELEASE ORAL at 18:03

## 2024-03-04 RX ADMIN — HYDROMORPHONE HYDROCHLORIDE 0.5 MG: 1 INJECTION, SOLUTION INTRAMUSCULAR; INTRAVENOUS; SUBCUTANEOUS at 11:19

## 2024-03-04 RX ADMIN — SODIUM CHLORIDE: 9 INJECTION, SOLUTION INTRAVENOUS at 02:01

## 2024-03-04 RX ADMIN — OXYCODONE HYDROCHLORIDE 5 MG: 5 TABLET ORAL at 01:14

## 2024-03-04 RX ADMIN — ESCITALOPRAM 20 MG: 5 TABLET, FILM COATED ORAL at 17:24

## 2024-03-04 RX ADMIN — DULOXETINE HYDROCHLORIDE 20 MG: 20 CAPSULE, DELAYED RELEASE ORAL at 17:24

## 2024-03-04 RX ADMIN — TRAZODONE HYDROCHLORIDE 50 MG: 50 TABLET ORAL at 22:06

## 2024-03-04 RX ADMIN — PANTOPRAZOLE SODIUM 40 MG: 40 INJECTION, POWDER, FOR SOLUTION INTRAVENOUS at 02:01

## 2024-03-04 RX ADMIN — LEVOTHYROXINE SODIUM 150 MCG: 0.07 TABLET ORAL at 17:24

## 2024-03-04 RX ADMIN — HYDROMORPHONE HYDROCHLORIDE 0.5 MG: 1 INJECTION, SOLUTION INTRAMUSCULAR; INTRAVENOUS; SUBCUTANEOUS at 19:47

## 2024-03-04 RX ADMIN — PANTOPRAZOLE SODIUM 40 MG: 40 INJECTION, POWDER, FOR SOLUTION INTRAVENOUS at 08:04

## 2024-03-04 RX ADMIN — SODIUM CHLORIDE: 9 INJECTION, SOLUTION INTRAVENOUS at 09:54

## 2024-03-04 RX ADMIN — HYDROMORPHONE HYDROCHLORIDE 0.5 MG: 1 INJECTION, SOLUTION INTRAMUSCULAR; INTRAVENOUS; SUBCUTANEOUS at 02:59

## 2024-03-04 ASSESSMENT — ACTIVITIES OF DAILY LIVING (ADL)
ADLS_ACUITY_SCORE: 38
ADLS_ACUITY_SCORE: 37
ADLS_ACUITY_SCORE: 23
ADLS_ACUITY_SCORE: 38
ADLS_ACUITY_SCORE: 23
ADLS_ACUITY_SCORE: 38
ADLS_ACUITY_SCORE: 38
ADLS_ACUITY_SCORE: 37
ADLS_ACUITY_SCORE: 38
ADLS_ACUITY_SCORE: 23
ADLS_ACUITY_SCORE: 38
ADLS_ACUITY_SCORE: 38

## 2024-03-04 NOTE — ED PROVIDER NOTES
"  History     Chief Complaint:  Abdominal Pain       HPI   Lynn Thompson is a 59 year old female with a history of diabetes, chronic pancreatitis, kidney stone amongst others presenting with abdominal pain.  Patient reports around 6:30 PM she was eating a hamburger and fries.  Shortly thereafter she developed sharp, constant generalized abdominal pain that radiates to the back.  The pain is waxing in intensity.  She reports accompanying nausea though denies vomiting.  No fever, chills, sore throat, cough, dyspnea, chest pain.  She does report looser, nonbloody stools though denies any dysuria, hematuria or other complaints.  No sick contacts, suspicious foods. No ETOH use.       Independent Historian:   None - Patient Only    Review of External Notes:   24 ED visit      Medications:    Catapres  Lexapro  Neurontin  Insulin  Desyrel  Glucagon  Omeprazole    Past Medical History:    PTSD  Pancreatitis  Opioid dependence  Crohn's disease  Kidney stones  Diabetes  Hypertension  Depression  Dyspepsia  Dysthymia  Anemia  Sleep apnea    Past Surgical History:    Gastric bypass  Small intestine surgery  Abdominoplasty   section  Cholecystectomy  Pancreatectomy  Colonoscopy  EGD  Cystoscopy  Cystectomy ovarian  Adenoidectomy   Hernia repair         Physical Exam   Patient Vitals for the past 24 hrs:   BP Temp Temp src Pulse Resp SpO2 Height Weight   24 (!) 176/75 -- -- -- -- -- -- --   24 -- 98.3  F (36.8  C) Temporal 78 18 95 % 1.626 m (5' 4\") 59 kg (130 lb)        Physical Exam  Nursing note and vitals reviewed.  Constitutional: Well nourished. Pacing around room  Eyes: Conjunctiva normal.  Pupils are equal, round, and reactive to light.   ENT: Nose normal. Mucous membranes pink and moist.    Neck: Normal range of motion.  CVS: Normal rate, regular rhythm.  Normal heart sounds.    Pulmonary: Lungs clear to auscultation bilaterally. No wheezes/rales/rhonchi.  GI: Abdomen soft. " Generalized abdominal tenderness. No rigidity or guarding.    MSK: Moves all extremities  Neuro: Alert. Follows simple commands.  Skin: Skin is warm and dry. No rash noted.   Psychiatric: Normal affect.       Emergency Department Course       Imaging:  US Abdomen Limited   Final Result   IMPRESSION:   1.  No new abnormalities. Bile ducts appear within normal limits in size. Pneumobilia.            Abd/pelvis CT,  IV  contrast only TRAUMA / AAA   Final Result   IMPRESSION:    1.  Extensive postoperative changes in the abdomen and pelvis including gastric bypass surgery and resection of the distal small bowel and a portion of the cecum.      2.  There are numerous fluid-filled loops of small bowel as well as partially fluid-filled right hemicolon with multiple scattered air-fluid levels present. The majority of the bowel is normal in caliber although there are a few scattered mildly dilated    loops of small bowel. Overall, findings are favored to more likely represent a nonspecific gastroenteritis. The possibility of low-grade or partial developing mechanical small bowel obstruction not completely excluded but felt to be less likely. An    obvious transition point or obstructing etiology is not seen on this study. Clinical correlation and follow-up as clinically warranted.      3.  Otherwise, no acute findings elsewhere in the abdomen or pelvis. See above for additional details.             Laboratory:  Labs Ordered and Resulted from Time of ED Arrival to Time of ED Departure   COMPREHENSIVE METABOLIC PANEL - Abnormal       Result Value    Sodium 140      Potassium 4.7      Carbon Dioxide (CO2) 26      Anion Gap 9      Urea Nitrogen 21.9      Creatinine 0.98 (*)     GFR Estimate 65      Calcium 9.7      Chloride 105      Glucose 278 (*)     Alkaline Phosphatase 268 (*)      (*)      (*)     Protein Total 7.3      Albumin 4.4      Bilirubin Total 0.4     LIPASE - Abnormal    Lipase 6 (*)    ROUTINE UA  WITH MICROSCOPIC - Abnormal    Color Urine Light Yellow      Appearance Urine Clear      Glucose Urine 100 (*)     Bilirubin Urine Negative      Ketones Urine Negative      Specific Gravity Urine 1.008      Blood Urine Moderate (*)     pH Urine 5.5      Protein Albumin Urine Negative      Urobilinogen Urine Normal      Nitrite Urine Negative      Leukocyte Esterase Urine Negative      Mucus Urine Present (*)     RBC Urine 26 (*)     WBC Urine 1      Squamous Epithelials Urine <1     ACETAMINOPHEN LEVEL - Abnormal    Acetaminophen <5.0 (*)    LACTIC ACID WHOLE BLOOD - Normal    Lactic Acid 1.6     INFLUENZA A/B, RSV, & SARS-COV2 PCR - Normal    Influenza A PCR Negative      Influenza B PCR Negative      RSV PCR Negative      SARS CoV2 PCR Negative     CBC WITH PLATELETS AND DIFFERENTIAL    WBC Count 6.2      RBC Count 4.25      Hemoglobin 12.7      Hematocrit 40.2      MCV 95      MCH 29.9      MCHC 31.6      RDW 14.5      Platelet Count 346      % Neutrophils 56      % Lymphocytes 28      % Monocytes 13      % Eosinophils 2      % Basophils 1      % Immature Granulocytes 0      NRBCs per 100 WBC 0      Absolute Neutrophils 3.5      Absolute Lymphocytes 1.7      Absolute Monocytes 0.8      Absolute Eosinophils 0.1      Absolute Basophils 0.1      Absolute Immature Granulocytes 0.0      Absolute NRBCs 0.0       Emergency Department Course & Assessments:    Interventions:  Medications   pantoprazole (PROTONIX) IV push injection 40 mg (has no administration in time range)   lidocaine 1 % 0.1-1 mL (has no administration in time range)   lidocaine (LMX4) cream (has no administration in time range)   sodium chloride (PF) 0.9% PF flush 3 mL (has no administration in time range)   sodium chloride (PF) 0.9% PF flush 3 mL (has no administration in time range)   glucose gel 15-30 g (has no administration in time range)     Or   dextrose 50 % injection 25-50 mL (has no administration in time range)     Or   glucagon injection 1  mg (has no administration in time range)   sodium chloride 0.9 % infusion (has no administration in time range)   hydrALAZINE (APRESOLINE) tablet 10 mg (has no administration in time range)     Or   hydrALAZINE (APRESOLINE) injection 10 mg (has no administration in time range)   insulin aspart (NovoLOG) injection (RAPID ACTING) (has no administration in time range)   sodium chloride 0.9% BOLUS 1,000 mL (0 mLs Intravenous Stopped 3/4/24 0059)   ondansetron (ZOFRAN) injection 4 mg (4 mg Intravenous $Given 3/3/24 2231)   HYDROmorphone (PF) (DILAUDID) injection 0.5 mg (0.5 mg Intravenous $Given 3/3/24 2231)   famotidine (PEPCID) injection 20 mg (20 mg Intravenous $Given 3/3/24 2231)   sodium chloride (PF) 0.9% PF flush 100 mL (56 mLs Intravenous $Given 3/3/24 2300)   iopamidol (ISOVUE-370) solution 500 mL (65 mLs Intravenous $Given 3/3/24 2300)   HYDROmorphone (PF) (DILAUDID) injection 0.5 mg (0.5 mg Intravenous $Given 3/3/24 2322)   oxyCODONE (ROXICODONE) tablet 5 mg (5 mg Oral $Given 3/4/24 0114)      Assessment   2349 I obtained history and performed physical exam as noted above.     Independent Interpretation (X-rays, CTs, rhythm strip):  None    Consultations/Discussion of Management or Tests:  2353 I spoke with Dr. Alvarado, of the hospitalist team, regarding the patient. They accepted the patient for admission.    Social Determinants of Health affecting care:   None    Disposition:  The patient was discharged.       Impression & Plan    Medical Decision Making:  Patient is a 60 yo female presenting with abdominal pain.  She is nontoxic on arrival though appears visibly uncomfortable.  Patient with noted hyperglycemia though no DKA.  Noted uptrending trasaminitis as well.  RUQ ultrasound without evidence of biliary dilation.  History of cholecystectomy.  CT abdomen read as concern for more gastroenteritis though cannot early SBO.  Patient to be observed at this time particularly given transaminitis. Patient denies  ETOH or tylenol ingestion.  She reported pain improvement after analgesia and remained hemodynamically stable.  Plan for continuation of NPO upon admission.      Diagnosis:    ICD-10-CM    1. Transaminitis  R74.01       2. Abdominal pain, unspecified abdominal location  R10.9            Discharge Medications:  New Prescriptions    No medications on file      Scribe Disclosure:  Jan SALDANA, am serving as a scribe at 12:05 AM on 3/4/2024 to document services personally performed by Alicia Escobedo DO based on my observations and the provider's statements to me.    3/3/2024   Alicia Escobedo DO McDonald, Lindsey E, DO  03/04/24 0144

## 2024-03-04 NOTE — PROVIDER NOTIFICATION
Notified provider of Temp 100.8. Avoid tylenol due to LFT's, allergy to NSAIDS. Plan to collect blood cultures and monitor per provider.

## 2024-03-04 NOTE — PROGRESS NOTES
Patient was seen and examined by me this morning.  History and physical completed by Dr. Alvarado was reviewed.  Patient is 59-year-old female who presents with abdominal pain and back pain.  CT scan of the abdomen pelvis showed some concern for partial small bowel obstruction.  She also has elevated LFTs.  This morning she is doing well with minimal abdominal pain.  No nausea or vomiting.  She has temp of 100.8, generalized bodyaches, weakness over the last 1 day.  She denies any fever or chills or nasal congestion.  Abdomen is soft, nontender there is no guarding or rigidity.  She had 1 episode of bowel movement this morning    Assessment  Elevated LFTs-etiology unclear.  Patient denies regular and heavy use of alcohol.  Low-grade fever-suspect viral infection  Multiple comorbidities    Plan  GI consulted and awaiting further recommendations  No indication of bowel obstruction, may advance diet to clear liquid diet and advance as tolerated  Patient is allergic to NSAIDs and Tylenol may be risky due to elevated LFTs.  Will obtain 2 sets of blood cultures, get procalcitonin, hold Tylenol or NSAIDs at this time.  Continue to monitor  Med rec pending    Addendum  -Patient continues to have fever now at 101.6.  Concern for acute febrile illness likely viral versus gastroenteritis.  --Will control fever with Tylenol 650 mg every 6 hours as needed as patient can take up to 2 g a day  --Screen for viral infection, add c diff pcr   --Briefly discussed with gastroenterology team

## 2024-03-04 NOTE — CONSULTS
GASTROENTEROLOGY CONSULTATION      Lynn Thompson  [id number] [facility] [place] [address]  59 year old female     Admission Date/Time: 3/3/2024  Primary Care Provider: Maryana Brooks     We were asked to see the patient in consultation by Dr. Hoffmann for evaluation of Elevated liver enzymes.    CC: Elevated liver enzymes, and epigastric discomfort     HPI:  Lynn Thompson is a 59 year old female patient with a history of total pancreatectomy with islet L autotransplant in 2013, splenectomy, choledochoduodenostomy, gastric bypass, appendectomy, ileal resection in 1992 for Crohn's disease, hernia repair, lysis of adhesions x2, posterior suture rectopexy, supracervical hysterectomy with bilateral salpingo-oophorectomy, uterosacral ligament suspension, Gan colposuspension  who presented with nausea, epigastric pain after eating mushrooms, fries and swish cheese sandwich from [employer].    She reports pain is better at rest. Nausea improved. She had one loose bm today. Passing gas. Denies excessive alcohol intake.     Admission labs are notable for creatinine 0.98, alkaline phosphatase 268, , , total bilirubin within normal range.  Lipase 6. Blood counts notable for hemoglobin of 11.5, other blood counts within normal range. Repeat labs shows improvement in liver panel , , ALK Phos 207. Total and direct bili wnl.     Cross-sectional imaging showed extensive postoperative changes in the abdomen and pelvis including gastric bypass surgery, resection of the distal small bowel and portion of the cecum.There are numerous fluid-filled loops of small bowel as well as partially fluid-filled right hemicolon with multiple scattered air-fluid levels present. The majority of the bowel is normal in caliber although there are a few scattered mildly dilated loops of small bowel. Otherwise, no acute findings elsewhere in the abdomen or pelvis. See above for additional details.     Follow-up  ultrasound abdomen was unremarkable. Bile duct appeared within normal limits in size.  Pneumobilia.      PAST MEDICAL HISTORY:  Patient Active Problem List    Diagnosis Date Noted    Health Care Home 05/03/2011     Priority: High     EMERGENCY CARE PLAN  Presenting Problem Signs and Symptoms Treatment Plan    Questions or conerns during clinic hours    I will call the clinic directly     Questions or conerns outside clinic hours    I will call the 24 hour nurse line at [phone]    Patient needs to schedule an appointment    I will call the 24 hour scheduling team at [phone] or clinic directly    Same day treatment     I will call the clinic first, nurse line if after hours, urgent care and express care if needed                                DX V65.8 REPLACED WITH [id number] [facility] (04/08/2013)      Transaminitis 03/04/2024     Priority: Medium    Abdominal pain, unspecified abdominal location 03/04/2024     Priority: Medium    Acute pyelonephritis 06/30/2023     Priority: Medium    Acute sepsis (H) 06/30/2023     Priority: Medium    Enteric hyperoxaluria 05/02/2023     Priority: Medium    Hypocitraturia 05/02/2023     Priority: Medium    History of uterine prolapse 01/12/2023     Priority: Medium    Vaginal atrophy 01/12/2023     Priority: Medium    Pyuria 09/19/2022     Priority: Medium    Recurrent kidney stones 09/19/2022     Priority: Medium    Urge incontinence 07/07/2022     Priority: Medium    Urinary urgency 07/07/2022     Priority: Medium    High-tone pelvic floor dysfunction 07/07/2022     Priority: Medium    Small bowel obstruction (H) 11/23/2021     Priority: Medium    Uterovaginal prolapse, unspecified 11/03/2021     Priority: Medium    Rectal prolapse 11/03/2021     Priority: Medium    Other iron deficiency anemia 03/15/2021     Priority: Medium    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     Priority: Medium    Acquired hypothyroidism 11/03/2016     Priority: Medium    Thyroid  nodule 11/01/2016     Priority: Medium    Dysthymia 03/01/2016     Priority: Medium    Anxiety 03/01/2016     Priority: Medium    H/O of rectopexy 12/29/2015     Priority: Medium    BLU (obstructive sleep apnea) 12/23/2015     Priority: Medium    Asplenia 10/24/2014     Priority: Medium    Exocrine pancreatic insufficiency 05/17/2013     Priority: Medium    Chronic pain syndrome 02/23/2013     Priority: Medium    Gastric bypass status for obesity 10/26/2010     Priority: Medium    Iron deficiency anemia secondary to inadequate dietary iron intake 12/28/2004     Priority: Medium     relates to gastric bypass            ROS: A comprehensive ten point review of systems was negative aside from those in mentioned in the HPI.       MEDICATIONS:   Prior to Admission medications    Medication Sig Start Date End Date Taking? Authorizing Provider   calcium carbonate 600 mg-vitamin D 400 units (CALTRATE) 600-400 MG-UNIT per tablet Take 1 tablet by mouth daily    Unknown, Entered By History   Continuous Blood Gluc Sensor (DEXCOM G6 SENSOR) MISC CHANGE EVERY 10 DAYS 11/22/23   Adriana Robles MD   Continuous Blood Gluc Transmit (DEXCOM G6 TRANSMITTER) MISC CHANGE EVERY 90 DAYS 11/22/23   Adriana Robles MD   DULoxetine (CYMBALTA) 20 MG capsule Take 20 mg by mouth daily    Unknown, Entered By History   escitalopram (LEXAPRO) 20 MG tablet Take 20 mg by mouth daily    Unknown, Entered By History   estradiol (ESTRACE) 0.1 MG/GM vaginal cream Place vaginally twice a week PLACE 2 GRAMS VAGINALLY 2 TIMES WEEKLY  Patient taking differently: Place vaginally twice a week PLACE 2 GRAMS VAGINALLY 2 TIMES WEEKLY (Tues & Fri) 1/12/23   Nciole Rivera MD   famotidine (PEPCID) 40 MG tablet Take 1 tablet (40 mg) by mouth 2 times daily as needed for heartburn 12/13/22   Maryana Brooks MD   FIASP 100 UNIT/ML SOLN Inject 15 units daily via insulin pump. May use up to 85 units daily. 12/8/23   Adriana Robles MD   FIASP PENFILL  100 UNIT/ML SOCT Inject 0.5-4 Units Subcutaneous 4 times daily, INJECT with meals and nightly 10/25/22   Adriana Robles MD   gabapentin (NEURONTIN) 300 MG capsule Take 300 mg by mouth nightly as needed    Unknown, Entered By History   Glucagon (GVOKE HYPOPEN 1-PACK) 1 MG/0.2ML SOAJ Inject 1 mg Subcutaneous once as needed (for hypoglycemia with loss of consciousness) 10/7/21   Adriana Robles MD   glucose 40 % GEL Take 15-30 g by mouth every 15 minutes as needed. 13   Suzi Short APRN CNP   hydrOXYzine (ATARAX) 25 MG tablet  19   Reported, Patient   Insulin Disposable Pump (OMNIPOD 5 G6 POD, GEN 5,) MISC CHANGE EVERY 3 DAYS 10/30/23   Adriana Robles MD   insulin glargine (LANTUS PEN) 100 UNIT/ML pen Inject 6 units daily in the event of pump failure 22   Adriana Robles MD   INSULIN PUMP - OUTPATIENT Inject Subcutaneous continuous Date Last Updated:   Omnipod, type of insulin: Fiasp  Basal Rates in units/hr  7143-1612: 0.15   9369-0984: 0.25  ICF (insulin to carb ratio): 20 (1 unit covers 20g carbs)  ISF (insulin sensitivity factor): 140-170 (1 unit lowers BG by 140-170mg/dL)- very sernsitive  Target B-130 mg/dL  Active Insulin Time: 3 hours    Unknown, Entered By History   levothyroxine (SYNTHROID/LEVOTHROID) 150 MCG tablet Take 1 tablet (150 mcg) by mouth daily 3/27/23   Adriana Robles MD   lipase-protease-amylase (VIOKACE) 47460-49197-02743 units TABS tablet Take 5 with meals and 3 with snacks; approximately daily maximum of 20 capsules 23   Adriana Robles MD   lipase-protease-amylase (ZENPEP) 00945-82497-392538 units CPEP Take 3 capsules with snacks and 5 capsules with meals; approximate daily maximum 20 capsules 23   Adriana Robles MD   loratadine (CLARITIN) 10 MG tablet Take 10 mg by mouth daily as needed     Unknown, Entered By History   omeprazole (PRILOSEC) 40 MG DR capsule Take 1 capsule (40 mg) by mouth 2 times daily Take 30-60 minutes before a  meal. 1/15/24   Maryana Brooks MD   potassium citrate (UROCIT-K) 10 MEQ (1080 MG) CR tablet Take 1 tablet (10 mEq) by mouth 3 times daily (with meals) Take with food. 23   Hananh Murcia MD   traMADol (ULTRAM) 50 MG tablet Take 1 tablet (50 mg) by mouth every 6 hours as needed for severe pain 23   Haydee Krishnamurthy PA-C   traZODone (DESYREL) 50 MG tablet Take 50 mg by mouth nightly as needed for sleep    Unknown, Entered By History        ALLERGIES:   Allergies   Allergen Reactions    Corticosteroids Other (See Comments)     All oral, IV and injectable steroids are contraindicated (unless in life threatening situations) in Islet Auto transplant recipients. They can cause irreversible loss of islet cell function. Please contact patient's transplant care coordinator, Erlinda Multani RN BSN at [phone]/pager: [phone] and endocrinologist prior to administration.      Povidone Iodine Hives     Causes skin to blister    Nsaids      naprosyn = GI upset    Sulfasalazine Nausea and Nausea and Vomiting        SOCIAL HISTORY:  Social History     Tobacco Use    Smoking status: Never    Smokeless tobacco: Never   Vaping Use    Vaping Use: Never used   Substance Use Topics    Alcohol use: Not Currently    Drug use: Not Currently        FAMILY HISTORY:  Family History   Problem Relation Age of Onset    Family History Negative Mother     Respiratory Father         COPD;  at 69    Genitourinary Problems Father         kidney stones    Substance Abuse Father     Depression Father     Asthma Father     Heart Disease Paternal Grandfather         M.I.    Coronary Artery Disease Paternal Grandfather     Hyperlipidemia Paternal Grandfather     Genitourinary Problems Brother         multiple brothers with kidney stones    Gastrointestinal Disease Maternal Grandmother         undiagnosed 'gut' issues    Coronary Artery Disease Maternal Grandfather     Hyperlipidemia Maternal Grandfather     Cerebrovascular  "Disease Paternal Grandmother         At the age of 103    Anxiety Disorder Paternal Grandmother     Osteoporosis Paternal Grandmother     Anxiety Disorder Son     Anxiety Disorder Daughter     Asthma Daughter     Colon Cancer No family hx of         PHYSICAL EXAM:   /77   Pulse 64   Temp (!) 100.8  F (38.2  C) (Oral)   Resp 18   Ht 1.626 m (5' 4\")   Wt 59 kg (130 lb)   LMP 12/19/2013   SpO2 92%   BMI 22.31 kg/m       General: alert, oriented, NAD  SKIN: no suspicious lesions, rashes, jaundice, or spider angiomas  EYES: No scleral icterus  RESPIRATORY: Non labored breathing, Lungs clear  CARDIOVASCULAR:  RRR. No murmurs, clicks gallops or rub  GASTROINTESTINAL: Active bowel sounds,  abdomen soft, no rigidity and Left lower abdominal  tenderness on palpation.  NEURO: Grossly WNL. Sleepy   PSYCH: no abnormal anxiety/depression     LABS:  I reviewed the patient's new clinical lab test results.   Recent Labs   Lab Test 03/04/24  0725 03/03/24 2046 02/12/24  0940 05/12/16  1003 05/10/16  2019   WBC 11.5* 6.2 6.5   < > 8.3   HGB 11.5* 12.7 12.5   < > 12.0   MCV 94 95 96   < > 92    346 357   < > 383   INR  --  0.97  --   --  1.03    < > = values in this interval not displayed.     Recent Labs   Lab Test 03/04/24  0725 03/03/24 2046 09/07/23  0930    140 143   POTASSIUM 5.0 4.7 4.3   CHLORIDE 107 105 105   CO2 27 26 28   BUN 18.5 21.9 24.1*   ANIONGAP 6* 9 10   VALARIE 8.7* 9.7 9.6     Recent Labs   Lab Test 03/04/24  0725 03/03/24  2146 03/03/24 2046 09/07/23  0930 06/30/23 2019 06/30/23  1944 01/05/23  1342 12/13/22  0759 11/23/21  2241 11/23/21  2136 03/01/21  0233 03/01/21  0208   ALBUMIN 3.6  --  4.4 4.0   < >  --    < >  --    < > 3.4   < > 3.3*   BILITOTAL 0.3  --  0.4 0.4   < >  --    < >  --    < > 0.2   < > 0.2   *  --  235* 69*   < >  --    < >  --    < > 47   < > 30   *  --  770* 47*   < >  --    < >  --    < > 41   < > 18   ALKPHOS 207*  --  268* 112*   < >  --    < >  " --    < > 156*   < > 116   PROTEIN  --  Negative  --   --   --  30*  --  Negative   < >  --    < >  --    LIPASE  --   --  6*  --   --   --   --   --   --  10*  --  17*    < > = values in this interval not displayed.        IMAGING/PROCEDURES:    CTAP   IMPRESSION:   1.  Extensive postoperative changes in the abdomen and pelvis including gastric bypass surgery and resection of the distal small bowel and a portion of the cecum.     2.  There are numerous fluid-filled loops of small bowel as well as partially fluid-filled right hemicolon with multiple scattered air-fluid levels present. The majority of the bowel is normal in caliber although there are a few scattered mildly dilated   loops of small bowel. Overall, findings are favored to more likely represent a nonspecific gastroenteritis. The possibility of low-grade or partial developing mechanical small bowel obstruction not completely excluded but felt to be less likely. An   obvious transition point or obstructing etiology is not seen on this study. Clinical correlation and follow-up as clinically warranted.     3.  Otherwise, no acute findings elsewhere in the abdomen or pelvis. See above for additional details.  I personally reviewed the patient's new imaging results.    US   Narrative & Impression   EXAM: US ABDOMEN LIMITED  LOCATION: [facility]  DATE: 3/4/2024     INDICATION: ruq pain; concern for possible cbd dilation. lfts quite elevated  COMPARISON: CT 03/03/2024  TECHNIQUE: Limited abdominal ultrasound.     FINDINGS:     GALLBLADDER: Surgically removed.     BILE DUCTS: No biliary dilatation. Pneumobilia present, unchanged The common duct measures 5 mm.     LIVER: Normal parenchyma with smooth contour. No focal mass.     RIGHT KIDNEY: No hydronephrosis.     PANCREAS: Not identified, surgically absent.     No ascites.                                                                      IMPRESSION:  1.  No new abnormalities. Bile ducts  appear within normal limits in size. Pneumobilia.       Problem list pertaining to GI:  Elevated liver enzymes and alk phos  Epigastric pain  Nausea  Pancreatitis, s/p pancreatectomy  Transfer of islet cells     Assessment: This is a 59 y-o female with extensive abdominal surgery. She is admitted for an evaluation of acute abdominal pain and nausea after eating food at [employer]. Labs notable for mild anemia, elevated liver enzymes and alk phos. Bili within normal limits. RUQ negative for biliary duct abnormality, s/p fabby. CT showed changes from previous surgeries and fluid filled small bowels. No other acute changes. Liver panel improved this morning. She is passing gas and had bowel movement today.     With normal imaging and normal bilirubin she is less likely to have biliary obstruction. Her elevated liver panel could be due to viral infection. Her symptoms could be from gastroenteritis.     Plan:  - Trend liver panel   - Clear liquid diet and advance as tolerated  - Replace electrolytes  - Agree with blood cultures  - Agree with ruling out viral infections and C diff    I discussed with Dr. Rodriguez     Thank you for allowing me to participate in the care of this patient.  Please contact me with any questions or concerns.    Total time spent:  Approximately, 45 minutes was spent providing patient care, including patient evaluation, reviewing documentation/test results, and . Thank you for asking us to participate in the care of this patient.      Ping Cantrell CNP   [facility] ([facility])  Mobile # [phone]

## 2024-03-04 NOTE — TELEPHONE ENCOUNTER
Pt called in, is in [facility] ER, wondering what Dr. Presley recommends related to her labs/elevated. Advised message alrady sent to Dr. Presley and that provider at [facility] are welcome to reach out to Dr. Presley for any recommendations. Patient understands plan.    ML

## 2024-03-04 NOTE — PHARMACY-ADMISSION MEDICATION HISTORY
Pharmacist Admission Medication History    Admission medication history is complete. The information provided in this note is only as accurate as the sources available at the time of the update.    Information Source(s): Patient, Family member, and CareEverywhere/SureScripts via in-person    Pertinent Information: None    Changes made to PTA medication list:  Added: None  Deleted: estradiol, glucose gel, zenpep, tramadol  Changed: calcium-vitamin d, insulin pump settings, potassium citrate from three times daily to two times daily.     Allergies reviewed with patient and updates made in EHR: yes    Medication History Completed By: Aileen Choudhury [facility] 3/4/2024 2:06 PM    Prior to Admission medications    Medication Sig Last Dose Taking? Auth Provider Long Term End Date   calcium carbonate 600 mg-vitamin D 400 units (CALTRATE) 600-400 MG-UNIT per tablet Take 1 tablet by mouth 2 times daily 3/3/2024 at pm Yes Unknown, Entered By History     DULoxetine (CYMBALTA) 20 MG capsule Take 20 mg by mouth daily 3/3/2024 at am Yes Unknown, Entered By History No    escitalopram (LEXAPRO) 20 MG tablet Take 20 mg by mouth daily 3/3/2024 at am Yes Unknown, Entered By History No    famotidine (PEPCID) 40 MG tablet Take 1 tablet (40 mg) by mouth 2 times daily as needed for heartburn 3/3/2024 at pm Yes Maryana Brooks MD     FIASP 100 UNIT/ML SOLN Inject 15 units daily via insulin pump. May use up to 85 units daily.  Yes Adriana Robles MD     gabapentin (NEURONTIN) 300 MG capsule Take 300 mg by mouth nightly as needed prn at prn Yes Unknown, Entered By History No    hydrOXYzine (ATARAX) 25 MG tablet Take 25 mg by mouth daily as needed prn at prn Yes Reported, Patient     Insulin Disposable Pump (OMNIPOD 5 G6 POD, GEN 5,) MISC CHANGE EVERY 3 DAYS  Yes Adriana Robles MD     INSULIN PUMP - OUTPATIENT Inject Subcutaneous continuous Date Last Updated on chart: 3/4/2024  Omnipod 5, type of insulin: Fiasp  Change site every 3  days  Basal Rates in units/hr  5763-9927: 1 unit/hr  ICF (insulin to carb ratio): 12 (1 unit covers 12g carbs)  ISF (insulin sensitivity factor): 140-170 (1 unit lowers BG by 140-170mg/dL)- very sernsitive  Target BG: Upper: 180, lower: 70  at pump currently on Yes Unknown, Entered By History     levothyroxine (SYNTHROID/LEVOTHROID) 150 MCG tablet Take 1 tablet (150 mcg) by mouth daily 3/3/2024 at am Yes Adriana Robles MD Yes    lipase-protease-amylase (VIOKACE) 85992-19687-69860 units TABS tablet Take 5 with meals and 3 with snacks; approximately daily maximum of 20 capsules 3/3/2024 at pm Yes Adriana Robles MD No    loratadine (CLARITIN) 10 MG tablet Take 10 mg by mouth daily as needed  prn at prn Yes Unknown, Entered By History     omeprazole (PRILOSEC) 40 MG DR capsule Take 1 capsule (40 mg) by mouth 2 times daily Take 30-60 minutes before a meal. 3/3/2024 at pm Yes Maryana Brooks MD No    potassium citrate (UROCIT-K) 10 MEQ (1080 MG) CR tablet Take 10 mEq by mouth 2 times daily (with meals) 3/3/2024 at pm Yes Unknown, Entered By History     traZODone (DESYREL) 50 MG tablet Take 50 mg by mouth at bedtime 3/2/2024 at pm Yes Unknown, Entered By History No    Continuous Blood Gluc Sensor (DEXCOM G6 SENSOR) MISC CHANGE EVERY 10 DAYS   Adriana Robles MD     Continuous Blood Gluc Transmit (DEXCOM G6 TRANSMITTER) MISC CHANGE EVERY 90 DAYS   Adriana Robles MD     FIASP PENFILL 100 UNIT/ML SOCT Inject 0.5-4 Units Subcutaneous 4 times daily, INJECT with meals and nightly   Adriana Robles MD     Glucagon (GVOKE HYPOPEN 1-PACK) 1 MG/0.2ML SOAJ Inject 1 mg Subcutaneous once as needed (for hypoglycemia with loss of consciousness)  at   Adriana Robles MD Yes    insulin glargine (LANTUS PEN) 100 UNIT/ML pen Inject 6 units daily in the event of pump failure  Patient taking differently: Inject Subcutaneous as needed for other (insulin pump failure) Exact dose unknown   Adriana Robles MD Yes

## 2024-03-04 NOTE — ED TRIAGE NOTES
Here for concern of abdominal pain radiating to the back after eating dinner. Associated with nausea, but did took zofran at home around 7pm. Stated feels like her bowel obstructions in the past. Took ibuprofen around 7pm for the pain. ABCs intact.      Triage Assessment (Adult)       Row Name 03/03/24 2037          Triage Assessment    Airway WDL WDL        Respiratory WDL    Respiratory WDL WDL        Cardiac WDL    Cardiac WDL WDL

## 2024-03-04 NOTE — PROGRESS NOTES
[facility]    ED Boarding Nurse Handoff Addendum Report:    Date/time: 3/4/2024, 10:08 AM    Activity Level: independent    Fall Risk: No    Active Infusions: NS 100ml/hr    Current Meds Due: see MAR    Current care needs: GI consult, pain control    Oxygen requirements (liters/min and/or FiO2): none    Respiratory status: Room air    Vital signs (within last 30 minutes):    Vitals:    03/04/24 0105 03/04/24 0318 03/04/24 0739 03/04/24 0959   BP:  112/62 137/77    Pulse:  75 64    Resp:  18 18    Temp:  98.2  F (36.8  C) (!) 100.8  F (38.2  C) (!) 101.6  F (38.7  C)   TempSrc:  Oral Oral Axillary   SpO2: 95%  92%    Weight:       Height:           Focused assessment within last 30 minutes:    A&Ox4. Tolerating CL diet. Abdominal pain minimal, radiates to back. Abdomen soft, tender in general. Tylenol 650mg given x2 for fevers (101.6, 101.0), chills, body aches, headache. Dilaudid 0.5mg IV given x2. Respiratory pannel negative. Stool collected for testing, rule out c.diff/enteric. Stools soft/formed, tan color. Magnesium replaced. Seen by GI, plan to trend LFT's. Blood cultures pending.     ED Boarding Nurse name: Cheryle Olson RN

## 2024-03-04 NOTE — ED NOTES
"[facility]  ED Nurse Handoff Report    ED Chief complaint: Abdominal Pain  . ED Diagnosis:   Final diagnoses:   Transaminitis   Abdominal pain, unspecified abdominal location       Allergies:   Allergies   Allergen Reactions    Corticosteroids Other (See Comments)     All oral, IV and injectable steroids are contraindicated (unless in life threatening situations) in [employer] transplant recipients. They can cause irreversible loss of islet cell function. Please contact patient's transplant care coordinator, Erlinda Multani RN BSN at [phone]/pager: [phone] and endocrinologist prior to administration.      Povidone Iodine Hives     Causes skin to blister    Nsaids      naprosyn = GI upset    Sulfasalazine Nausea and Nausea and Vomiting       Code Status: Full Code    Activity level - Baseline/Home:  independent.  Activity Level - Current:   independent.   Lift room needed: No.   Bariatric: No   Needed: No   Isolation: No.   Infection: Not Applicable  COVID r/o and special precautions.     Respiratory status: Room air    Vital Signs (within 30 minutes):   Vitals:    03/03/24 2038 03/03/24 2039   BP:  (!) 176/75   Pulse: 78    Resp: 18    Temp: 98.3  F (36.8  C)    TempSrc: Temporal    SpO2: 95%    Weight: 59 kg (130 lb)    Height: 1.626 m (5' 4\")        Cardiac Rhythm:  ,      Pain level:    Patient confused: No.   Patient Falls Risk: patient and family education.   Elimination Status: Has voided     Patient Report - Initial Complaint: . Here for concern of abdominal pain radiating to the back after eating dinner. Associated with nausea, but did took zofran at home around 7pm. Stated feels like her bowel obstructions in the past. Took ibuprofen around 7pm for the pain. ABCs intact.    Focused Assessment: abd pain, nausea.     Abnormal Results:   Labs Ordered and Resulted from Time of ED Arrival to Time of ED Departure   COMPREHENSIVE METABOLIC PANEL - Abnormal       Result " Value    Sodium 140      Potassium 4.7      Carbon Dioxide (CO2) 26      Anion Gap 9      Urea Nitrogen 21.9      Creatinine 0.98 (*)     GFR Estimate 65      Calcium 9.7      Chloride 105      Glucose 278 (*)     Alkaline Phosphatase 268 (*)      (*)      (*)     Protein Total 7.3      Albumin 4.4      Bilirubin Total 0.4     LIPASE - Abnormal    Lipase 6 (*)    ROUTINE UA WITH MICROSCOPIC - Abnormal    Color Urine Light Yellow      Appearance Urine Clear      Glucose Urine 100 (*)     Bilirubin Urine Negative      Ketones Urine Negative      Specific Gravity Urine 1.008      Blood Urine Moderate (*)     pH Urine 5.5      Protein Albumin Urine Negative      Urobilinogen Urine Normal      Nitrite Urine Negative      Leukocyte Esterase Urine Negative      Mucus Urine Present (*)     RBC Urine 26 (*)     WBC Urine 1      Squamous Epithelials Urine <1     ACETAMINOPHEN LEVEL - Abnormal    Acetaminophen <5.0 (*)    LACTIC ACID WHOLE BLOOD - Normal    Lactic Acid 1.6     CBC WITH PLATELETS AND DIFFERENTIAL    WBC Count 6.2      RBC Count 4.25      Hemoglobin 12.7      Hematocrit 40.2      MCV 95      MCH 29.9      MCHC 31.6      RDW 14.5      Platelet Count 346      % Neutrophils 56      % Lymphocytes 28      % Monocytes 13      % Eosinophils 2      % Basophils 1      % Immature Granulocytes 0      NRBCs per 100 WBC 0      Absolute Neutrophils 3.5      Absolute Lymphocytes 1.7      Absolute Monocytes 0.8      Absolute Eosinophils 0.1      Absolute Basophils 0.1      Absolute Immature Granulocytes 0.0      Absolute NRBCs 0.0     INFLUENZA A/B, RSV, & SARS-COV2 PCR        Abd/pelvis CT,  IV  contrast only TRAUMA / AAA   Final Result   IMPRESSION:    1.  Extensive postoperative changes in the abdomen and pelvis including gastric bypass surgery and resection of the distal small bowel and a portion of the cecum.      2.  There are numerous fluid-filled loops of small bowel as well as partially fluid-filled right  hemicolon with multiple scattered air-fluid levels present. The majority of the bowel is normal in caliber although there are a few scattered mildly dilated    loops of small bowel. Overall, findings are favored to more likely represent a nonspecific gastroenteritis. The possibility of low-grade or partial developing mechanical small bowel obstruction not completely excluded but felt to be less likely. An    obvious transition point or obstructing etiology is not seen on this study. Clinical correlation and follow-up as clinically warranted.      3.  Otherwise, no acute findings elsewhere in the abdomen or pelvis. See above for additional details.      US Abdomen Limited    (Results Pending)       Treatments provided: see MAR  Family Comments:   OBS brochure/video discussed/provided to patient:  Yes  ED Medications:   Medications   sodium chloride 0.9% BOLUS 1,000 mL (1,000 mLs Intravenous $New Bag 3/3/24 2230)   ondansetron (ZOFRAN) injection 4 mg (4 mg Intravenous $Given 3/3/24 2231)   HYDROmorphone (PF) (DILAUDID) injection 0.5 mg (0.5 mg Intravenous $Given 3/3/24 2231)   famotidine (PEPCID) injection 20 mg (20 mg Intravenous $Given 3/3/24 2231)   sodium chloride (PF) 0.9% PF flush 100 mL (56 mLs Intravenous $Given 3/3/24 2300)   iopamidol (ISOVUE-370) solution 500 mL (65 mLs Intravenous $Given 3/3/24 2300)   HYDROmorphone (PF) (DILAUDID) injection 0.5 mg (0.5 mg Intravenous $Given 3/3/24 2322)       Drips infusing:  No  For the majority of the shift this patient was Green.   Interventions performed were .    Sepsis treatment initiated: No    Cares/treatment/interventions/medications to be completed following ED care: all admit orders    ED Nurse Name: Annemarie Lemus RN  12:11 AM

## 2024-03-04 NOTE — H&P
[facility]    Hospitalist History and Physical    Name: Lynn Thompson    MRN: [id number]  YOB: 1965    Age: 59 year old  Date of Admission:  3/3/2024  Date of Service (when I saw the patient): 03/04/24    Assessment & Plan   Lynn Thompson is a 59 year old female with past medical history significant for chronic pancreatitis status post pancreatectomy and pancreatic islet transplant, history of bypass surgery, chronic kidney stones, diabetes mellitus, hypothyroidism, hypertension, GERD presented to the emergency room with sudden onset abdominal pain.  CT abdomen pelvis shows may be partial bowel obstruction versus gastroenteritis.  Labs show abnormal LFTs, elevated amylase.  No associated fever or leukocytosis.  Patient is being admitted for abdominal pain      Acute onset abdominal pain  Abnormal LFTs  -History of multiple abdominal surgeries including pancreatectomy and bypass surgery  -CT abdomen pelvis suggestive of partial bowel obstruction and  -With worsening LFTs cannot exclude obstructive stone.  -Right upper quadrant ultrasound ordered  -Could possibly need further imaging/? MRCP pending ultrasound results  -N.p.o. 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  -Pending scale insulin    ?Hypertension  -Did not see any antihypertensive meds on med list.  Will need to review and reconcile in a.m.  -Patient's blood pressure is on the lower side does not need any treatment at this time    Hypothyroidism  -Continue levothyroxine    History of GERD  -Will order IV PPI    Anxiety and depression  -We will need to review prior to admission meds once reconciled    Misc: Will need to review and reorder meds once reconciled        DVT Prophylaxis: Pneumatic Compression Devices  Code Status: Full Code    Disposition: Admitted as inpatient    Primary Care  Physician   Maryana Brooks    Chief Complaint   Abdominal pain today    History is obtained from the patient    History of Present Illness   Lynn Thompson is a 59 year old female with past medical history significant for chronic pancreatitis status post pancreatectomy and pancreatic islet transplant, history of bypass surgery, chronic kidney stones, diabetes mellitus, hypothyroidism, hypertension, GERD presented to the emergency room with sudden onset abdominal pain.      Patient notes she had a stressful week, she ate [employer] mushroom and fries soon after eating developed sudden onset abdominal pain.  She did have distended abdomen felt really bloated and had about 7-9/10 abdominal pain constant sharp mostly localized in epigastric area and radiating to the back.  Associated with some nausea no vomiting.  Patient is generally constipated has had no bowel movement for last 4 days.  No fever no chills.  She tried some fluids with no change in pain.  No other relieving or exacerbating factors.  More than 10 point review of systems was carried out was otherwise negative.    Evaluation in the emergency room including CT abdomen pelvis shows possible partial bowel obstruction versus gastroenteritis.  Labs show abnormal LFTs, elevated amylase.  No associated fever or leukocytosis.  Patient is being admitted for further evaluation and treatment.      Past Medical History    Past Medical History:   Diagnosis Date    Benign paroxysmal positional vertigo     occ.     Calculus of kidney 05/2005    x1 on L side passed, several stones.  Has been tested for oxalate.    Chronic abdominal pain 07/17/2013    Chronic pancreatitis 07/17/2013    Depression     also occ panic spells    Diabetes (H) 5/10/2013    Total Pancreatomy with Auto Islets Transplant    Dyspepsia 06/1999    H. pylori   treated    Dysthymia 03/01/2016    Headaches     still periodic HA's ;  often 5X/week    Hypertension 02/22/2016    Stress related    Iron  deficiency anemia 2003    relates to gastric bypass    Post-pancreatectomy diabetes melltius 2013    Sleep apnea     uses splint    Spasm of sphincter of Oddi     [place] [facility] [place]    Thyroid nodule 2016         Past Surgical History   Past Surgical History:   Procedure Laterality Date    ABDOMINOPLASTY  2002    Tummy tuck    APPENDECTOMY  1990    BUNIONECTOMY Right 1998    CBD Stent placement  2002    CBD stent; Dr. Presley     SECTION      CHOLECYSTECTOMY      COLONOSCOPY N/A 2021    Procedure: COLONOSCOPY INCOMPLETE Aborted due to incomplete prep  will need to take additional prep and return tomorrow 21;  Surgeon: Ihsan Saenz MD;  Location: [facility]    COMBINED CYSTOSCOPY, RETROGRADES, URETEROSCOPY, LASER HOLMIUM LITHOTRIPSY URETER(S), INSERT STENT Right 2015    Procedure: COMBINED CYSTOSCOPY, RETROGRADES, URETEROSCOPY, LASER HOLMIUM LITHOTRIPSY URETER(S), INSERT STENT;  Surgeon: Kennedi Aldana MD;  Location: [facility]    COMBINED CYSTOSCOPY, RETROGRADES, URETEROSCOPY, LASER HOLMIUM LITHOTRIPSY URETER(S), INSERT STENT Right 2015    Procedure: COMBINED CYSTOSCOPY, RETROGRADES, URETEROSCOPY, LASER HOLMIUM LITHOTRIPSY URETER(S), INSERT STENT;  Surgeon: Kennedi Aldana MD;  [facility]: [facility]    COSMETIC SURGERY  2002    Tummy tuck    CYSTECTOMY OVARIAN BENIGN Right     CYSTOSCOPY, RETROGRADES, INSERT STENT URETER(S), COMBINED  10/02/2012    Procedure: COMBINED CYSTOSCOPY, RETROGRADES, INSERT STENT URETER(S);  COMBINED CYSTOSCOPY,  , INSERT LEFT STENT URETER;  Surgeon: Johny Baez MD;  Location: [facility]    CYSTOSCOPY, RETROGRADES, INSERT STENT URETER(S), COMBINED Left 2022    Procedure: Cystoscopy with left retrograde pyelogram, left ureteroscopy, thulium laser lithotripsy of left ureteral calculus, stone basketing and left ureteral stent insertion;  Surgeon: Alonzo Rome MD;   Location: [facility]    ESOPHAGOSCOPY, GASTROSCOPY, DUODENOSCOPY (EGD), COMBINED N/A 01/24/2018    Procedure: COMBINED ESOPHAGOSCOPY, GASTROSCOPY, DUODENOSCOPY (EGD);  ESOPHAGOSCOPY, GASTROSCOPY, DUODENOSCOPY (EGD)    ;  Surgeon: Tamir Rodgers MD;  Location: [facility]    EXTRACORPOREAL SHOCK WAVE LITHOTRIPSY (ESWL)  10/16/2012    Procedure: EXTRACORPOREAL SHOCK WAVE LITHOTRIPSY (ESWL);  left EXTRACORPOREAL SHOCK WAVE LITHOTRIPSY (ESWL) ;  Surgeon: Johny Baez MD;  Location: [facility]    Gastric bypass NOS      HERNIA REPAIR  02/2015    HYSTERECTOMY SUPRACERVICAL, BILATERAL SALPINGO-OOPHORECTOMY, COMBINED N/A 02/01/2022    Procedure: Abdominal supracervical hysterectomy, bilateral salpingooophrectomy;  Surgeon: Nicole Rivera MD;  [facility]    IRRIGATION AND DEBRIDEMENT HAND, COMBINED Left 10/30/2020    Procedure: Left hand sharp excisional debridement of skin, subcutaneous tissue and fat with a scalpel, 2 x 1 x 1 cm.;  Surgeon: Demian Renteria MD;  Location: [facility]    LAPAROSCOPIC LYSIS ADHESIONS N/A 02/20/2015    Procedure: LAPAROSCOPIC LYSIS ADHESIONS;  Surgeon: Aaron Early MD;  [facility]    LAPAROSCOPIC LYSIS ADHESIONS N/A 12/29/2015    Procedure: LAPAROSCOPIC LYSIS ADHESIONS;  Surgeon: Aaron Early MD;  [facility]    PANCREATECTOMY, TRANSPLANT AUTO ISLET CELL, COMBINED  05/10/2013    Procedure: COMBINED PANCREATECTOMY, TRANSPLANT AUTO ISLET CELL;  Pancreatectomy, Auto Islet Cell Transplant   hernia repair, jejunostomy tube and liver biopsies with Anesthesia General with block;  Surgeon: Aaron Early MD;  [facility]    Partial ileum resection  1992    RECTOPEXY ABDOMINAL N/A 02/01/2022    Procedure: RECTOPEXY, ABDOMINAL;  Surgeon: Uriah Sheridan MD;  [facility]    REPAIR PTOSIS BROW BILATERAL Bilateral 06/09/2020    Procedure: BILATERAL BROW PTOSIS REPAIR;  Surgeon: Denise Alberts MD;  Location: [facility]    SACROCOLPOPEXY, CYSTOSCOPY, COMBINED N/A  2022    Procedure: Uterosacral ligament suspension, pina colposuspension with Cystoscopy;  Surgeon: Nicole Rivera MD;  [facility]    SIGMOIDOSCOPY FLEXIBLE N/A 2022    Procedure: SIGMOIDOSCOPY, FLEXIBLE;  Surgeon: Uriah Sheridan MD;  [facility]    Surgery for SBO      TONSILLECTOMY, ADENOIDECTOMY, COMBINED  1997    TRANSPLANT  5/10/13    Pancreatic Auto-Islet Transplant       Prior to Admission Medications   Prior to Admission Medications   Prescriptions Last Dose Informant Patient Reported? Taking?   Continuous Blood Gluc Sensor (DEXCOM G6 SENSOR) MISC   No No   Sig: CHANGE EVERY 10 DAYS   Continuous Blood Gluc Transmit (DEXCOM G6 TRANSMITTER) MISC   No No   Sig: CHANGE EVERY 90 DAYS   DULoxetine (CYMBALTA) 20 MG capsule   Yes No   Sig: Take 20 mg by mouth daily   FIASP 100 UNIT/ML SOLN   No No   Sig: Inject 15 units daily via insulin pump. May use up to 85 units daily.   FIASP PENFILL 100 UNIT/ML SOCT   No No   Sig: Inject 0.5-4 Units Subcutaneous 4 times daily, INJECT with meals and nightly   Glucagon (GVOKE HYPOPEN 1-PACK) 1 MG/0.2ML SOAJ   No No   Sig: Inject 1 mg Subcutaneous once as needed (for hypoglycemia with loss of consciousness)   INSULIN PUMP - OUTPATIENT   Yes No   Sig: Inject Subcutaneous continuous Date Last Updated:   Omnipod, type of insulin: Fiasp  Basal Rates in units/hr  1110-6302: 0.15   7757-5105: 0.25  ICF (insulin to carb ratio): 20 (1 unit covers 20g carbs)  ISF (insulin sensitivity factor): 140-170 (1 unit lowers BG by 140-170mg/dL)- very sernsitive  Target B-130 mg/dL  Active Insulin Time: 3 hours   Insulin Disposable Pump (OMNIPOD 5 G6 POD, GEN 5,) MISC   No No   Sig: CHANGE EVERY 3 DAYS   calcium carbonate 600 mg-vitamin D 400 units (CALTRATE) 600-400 MG-UNIT per tablet   Yes No   Sig: Take 1 tablet by mouth daily   escitalopram (LEXAPRO) 20 MG tablet   Yes No   Sig: Take 20 mg by mouth daily   estradiol (ESTRACE) 0.1 MG/GM  vaginal cream   No No   Sig: Place vaginally twice a week PLACE 2 GRAMS VAGINALLY 2 TIMES WEEKLY   Patient taking differently: Place vaginally twice a week PLACE 2 GRAMS VAGINALLY 2 TIMES WEEKLY (Tues & Fri)   famotidine (PEPCID) 40 MG tablet   No No   Sig: Take 1 tablet (40 mg) by mouth 2 times daily as needed for heartburn   gabapentin (NEURONTIN) 300 MG capsule   Yes No   Sig: Take 300 mg by mouth nightly as needed   glucose 40 % GEL  Self No No   Sig: Take 15-30 g by mouth every 15 minutes as needed.   hydrOXYzine (ATARAX) 25 MG tablet   Yes No   insulin glargine (LANTUS PEN) 100 UNIT/ML pen   No No   Sig: Inject 6 units daily in the event of pump failure   levothyroxine (SYNTHROID/LEVOTHROID) 150 MCG tablet   No No   Sig: Take 1 tablet (150 mcg) by mouth daily   lipase-protease-amylase (VIOKACE) 53030-52078-60220 units TABS tablet   No No   Sig: Take 5 with meals and 3 with snacks; approximately daily maximum of 20 capsules   lipase-protease-amylase (ZENPEP) 51153-85191-474821 units CPEP   No No   Sig: Take 3 capsules with snacks and 5 capsules with meals; approximate daily maximum 20 capsules   loratadine (CLARITIN) 10 MG tablet  Self Yes No   Sig: Take 10 mg by mouth daily as needed    omeprazole (PRILOSEC) 40 MG DR capsule   No No   Sig: Take 1 capsule (40 mg) by mouth 2 times daily Take 30-60 minutes before a meal.   potassium citrate (UROCIT-K) 10 MEQ (1080 MG) CR tablet   No No   Sig: Take 1 tablet (10 mEq) by mouth 3 times daily (with meals) Take with food.   traMADol (ULTRAM) 50 MG tablet   No No   Sig: Take 1 tablet (50 mg) by mouth every 6 hours as needed for severe pain   traZODone (DESYREL) 50 MG tablet   Yes No   Sig: Take 50 mg by mouth nightly as needed for sleep      Facility-Administered Medications: None     Allergies   Allergies   Allergen Reactions    Corticosteroids Other (See Comments)     All oral, IV and injectable steroids are contraindicated (unless in life threatening situations) in  Islet Auto transplant recipients. They can cause irreversible loss of islet cell function. Please contact patient's transplant care coordinator, Erlinda Multani RN BSN at [phone]/pager: [phone] and endocrinologist prior to administration.      Povidone Iodine Hives     Causes skin to blister    Nsaids      naprosyn = GI upset    Sulfasalazine Nausea and Nausea and Vomiting       Social History   Social History     Tobacco Use    Smoking status: Never    Smokeless tobacco: Never   Substance Use Topics    Alcohol use: Not Currently     Social History     Social History Narrative    Not on file     Denies smoking no use of alcohol.    Family History   I have reviewed this patient's family history and updated it with pertinent information if needed.   Family History   Problem Relation Age of Onset    Family History Negative Mother     Respiratory Father         COPD;  at 69    Genitourinary Problems Father         kidney stones    Substance Abuse Father     Depression Father     Asthma Father     Heart Disease Paternal Grandfather         M.I.    Coronary Artery Disease Paternal Grandfather     Hyperlipidemia Paternal Grandfather     Genitourinary Problems Brother         multiple brothers with kidney stones    Gastrointestinal Disease Maternal Grandmother         undiagnosed 'gut' issues    Coronary Artery Disease Maternal Grandfather     Hyperlipidemia Maternal Grandfather     Cerebrovascular Disease Paternal Grandmother         At the age of 103    Anxiety Disorder Paternal Grandmother     Osteoporosis Paternal Grandmother     Anxiety Disorder Son     Anxiety Disorder Daughter     Asthma Daughter     Colon Cancer No family hx of          Review of Systems   A Comprehensive greater than 10 system review of systems was carried out.  Pertinent positives and negatives are noted above.  Otherwise negative for contributory information.    Physical Exam   Temp: 98.3  F (36.8  C) Temp src: Temporal BP: 111/59  "Pulse: 71   Resp: 18 SpO2: 94 %      Vital Signs with Ranges  Temp:  [98.3  F (36.8  C)] 98.3  F (36.8  C)  Pulse:  [71-78] 71  Resp:  [18] 18  BP: (111-176)/(59-75) 111/59  SpO2:  [94 %-96 %] 94 %  130 lbs 0 oz    GEN:  Alert, oriented x 3, appears comfortable, no overt distress  HEENT:  Normocephalic/atraumatic, no scleral icterus, no nasal discharge, mouth moist.  CV:  Regular rate and rhythm, no murmur or JVD.  S1 + S2 noted, no S3 or S4.  LUNGS: Poor inspiratory effort otherwise clear to auscultation bilaterally without rales/rhonchi/wheezing/retractions.  Symmetric chest rise on inhalation noted.  ABD:  Active bowel sounds, soft, epigastric tenderness.  No rebound/guarding/rigidity.  EXT:  No edema.  No cyanosis.  No joint synovitis noted.  SKIN:  Dry to touch, no exanthems noted in the visualized areas.  NEURO:  Symmetric muscle strength,   No new focal deficits appreciated.    Data   Data reviewed today:  I personally reviewed the EKG tracing showing normal sinus rhythm with flat T wave in lead III .    Recent Labs   Lab 03/03/24 2046   WBC 6.2   HGB 12.7   HCT 40.2   MCV 95        7-Day Micro Results       Collected Updated Procedure Result Status      03/03/2024 2337 03/04/2024 0033 Symptomatic Influenza A/B, RSV, & SARS-CoV2 PCR (COVID-19) Nasopharyngeal [[id number]]    Swab from Nasopharyngeal    Final result Component Value   Influenza A PCR Negative   Influenza B PCR Negative   RSV PCR Negative   SARS CoV2 PCR Negative   NEGATIVE: SARS-CoV-2 (COVID-19) RNA not detected, presumed negative.                  Recent Labs   Lab 03/03/24 2046      POTASSIUM 4.7   CHLORIDE 105   CO2 26   ANIONGAP 9   *   BUN 21.9   CR 0.98*   GFRESTIMATED 65   VALARIE 9.7   PROTTOTAL 7.3   ALBUMIN 4.4   BILITOTAL 0.4   ALKPHOS 268*   *   *     No results for input(s): \"NTBNPI\", \"NTBNP\" in the last 168 hours.  Recent Labs   Lab 03/03/24 2046   *     Recent Labs   Lab 03/03/24 2046 " "  HGB 12.7     Recent Labs   Lab 03/03/24 2046   *   *   ALKPHOS 268*   BILITOTAL 0.4     No results for input(s): \"INR\" in the last 168 hours.  Recent Labs   Lab 03/03/24  2200   LACT 1.6     Recent Labs   Lab 03/03/24 2046   LIPASE 6*     Recent Labs   Lab 03/03/24 2046        Recent Labs   Lab 03/03/24 2046   BUN 21.9   CR 0.98*     No results for input(s): \"TSH\" in the last 168 hours.  No results for input(s): \"TROPONIN\", \"TROPI\", \"TROPR\", \"TROPONINIS\" in the last 168 hours.    Invalid input(s): \"TROPT\", \"TROP\", \"TROPONINIES\", \"TNIH\"  Recent Labs   Lab 03/03/24  2146   COLOR Light Yellow   APPEARANCE Clear   URINEGLC 100*   URINEBILI Negative   URINEKETONE Negative   SG 1.008   UBLD Moderate*   URINEPH 5.5   PROTEIN Negative   NITRITE Negative   LEUKEST Negative   RBCU 26*   WBCU 1       Recent Results (from the past 24 hour(s))   Abd/pelvis CT,  IV  contrast only TRAUMA / AAA    Narrative    EXAM: CT ABDOMEN PELVIS W CONTRAST  LOCATION: [facility]  DATE: 3/3/2024    INDICATION: Abdominal pain.  COMPARISON: 06/30/2023.  TECHNIQUE: CT scan of the abdomen and pelvis was performed following injection of IV contrast. Multiplanar reformats were obtained. Dose reduction techniques were used.  CONTRAST: 65 mL Isovue 370.    FINDINGS:   LOWER CHEST: Normal.    HEPATOBILIARY: Liver is negative. Cholecystectomy. No biliary dilatation. Pneumobilia is present compatible with prior biliary intervention.    PANCREAS: Surgically absent.    SPLEEN: Surgically absent.    ADRENAL GLANDS: Normal.    KIDNEYS/BLADDER: Mild cortical scarring/atrophy left kidney. 1 cm nonobstructing stone lower pole left kidney. Right kidney is negative. No hydronephrosis. Bladder is unremarkable.    BOWEL: Postoperative changes gastric bypass surgery and postoperative changes distal small bowel and base of the cecum with neoterminal ileum in the right lower quadrant. There are numerous fluid-filled " loops of small bowel with multiple air-fluid levels   present. There are a few mildly dilated loops of small bowel but the majority of the bowel is normal in caliber. Right hemicolon is partially fluid-filled with air-fluid levels and normal in caliber. Overall, findings favored to be more likely due to a   nonspecific gastroenteritis with low-grade or partial mechanical obstruction felt to be less likely. No definite obstructing process seen. No bowel wall pneumatosis and no free air.    LYMPH NODES: Normal.    VASCULATURE: Unremarkable.    PELVIC ORGANS: Hysterectomy. No pelvic masses.    MUSCULOSKELETAL: Normal.      Impression    IMPRESSION:   1.  Extensive postoperative changes in the abdomen and pelvis including gastric bypass surgery and resection of the distal small bowel and a portion of the cecum.    2.  There are numerous fluid-filled loops of small bowel as well as partially fluid-filled right hemicolon with multiple scattered air-fluid levels present. The majority of the bowel is normal in caliber although there are a few scattered mildly dilated   loops of small bowel. Overall, findings are favored to more likely represent a nonspecific gastroenteritis. The possibility of low-grade or partial developing mechanical small bowel obstruction not completely excluded but felt to be less likely. An   obvious transition point or obstructing etiology is not seen on this study. Clinical correlation and follow-up as clinically warranted.    3.  Otherwise, no acute findings elsewhere in the abdomen or pelvis. See above for additional details.

## 2024-03-04 NOTE — PROVIDER NOTIFICATION
[facility]    ED Boarding Nurse Handoff Addendum Report:    Date/time: 3/4/2024, 3:39 AM    Activity Level: standby    Fall Risk: No    Active Infusions: NS @ 100 ml/hr    Current Meds Due: None    Current care needs: Pain management, NPO, IVF, GI consult    Oxygen requirements (liters/min and/or FiO2): None     Respiratory status: Room air    Vital signs (within last 30 minutes):    Vitals:    03/04/24 0101 03/04/24 0102 03/04/24 0103 03/04/24 0105   BP:       Pulse:       Resp:       Temp:       TempSrc:       SpO2: 93% 95% 95% 95%   Weight:       Height:           Focused assessment within last 30 minutes:    Pt is A&Ox4, able to make needs known. VSS on RA. Denies dizziness, nausea, SOB & CP. Abdominal pain managed w/ IV dilaudid. NS @ 100 ml/hr. Q4h BG, no corrective insulin needed. Up SBA. Voiding appropriately.     ED Boarding Nurse name: Astrid Chavez RN    RECEIVING UNIT ED HANDOFF REVIEW    Above ED Nurse Handoff Report was reviewed: Yes  Reviewed by: Eugenio Rees RN on March 4, 2024 at 8:09 PM   SHANA Wills called the ED to inform them the note was read: Yes

## 2024-03-05 LAB
ALBUMIN SERPL BCG-MCNC: 3 G/DL (ref 3.5–5.2)
ALP SERPL-CCNC: 189 U/L (ref 40–150)
ALT SERPL W P-5'-P-CCNC: 108 U/L (ref 0–50)
ANION GAP SERPL CALCULATED.3IONS-SCNC: 10 MMOL/L (ref 7–15)
AST SERPL W P-5'-P-CCNC: 88 U/L (ref 0–45)
BILIRUB SERPL-MCNC: 0.3 MG/DL
BUN SERPL-MCNC: 11.4 MG/DL (ref 8–23)
CALCIUM SERPL-MCNC: 8.4 MG/DL (ref 8.8–10.2)
CHLORIDE SERPL-SCNC: 104 MMOL/L (ref 98–107)
CREAT SERPL-MCNC: 0.81 MG/DL (ref 0.51–0.95)
DEPRECATED HCO3 PLAS-SCNC: 24 MMOL/L (ref 22–29)
EGFRCR SERPLBLD CKD-EPI 2021: 82 ML/MIN/1.73M2
ERYTHROCYTE [DISTWIDTH] IN BLOOD BY AUTOMATED COUNT: 14.7 % (ref 10–15)
GLUCOSE BLDC GLUCOMTR-MCNC: 112 MG/DL (ref 70–99)
GLUCOSE BLDC GLUCOMTR-MCNC: 168 MG/DL (ref 70–99)
GLUCOSE BLDC GLUCOMTR-MCNC: 212 MG/DL (ref 70–99)
GLUCOSE BLDC GLUCOMTR-MCNC: 72 MG/DL (ref 70–99)
GLUCOSE BLDC GLUCOMTR-MCNC: 84 MG/DL (ref 70–99)
GLUCOSE SERPL-MCNC: 104 MG/DL (ref 70–99)
HCT VFR BLD AUTO: 35.1 % (ref 35–47)
HGB BLD-MCNC: 11.3 G/DL (ref 11.7–15.7)
MAGNESIUM SERPL-MCNC: 2 MG/DL (ref 1.7–2.3)
MCH RBC QN AUTO: 30.3 PG (ref 26.5–33)
MCHC RBC AUTO-ENTMCNC: 32.2 G/DL (ref 31.5–36.5)
MCV RBC AUTO: 94 FL (ref 78–100)
PLATELET # BLD AUTO: 275 10E3/UL (ref 150–450)
POTASSIUM SERPL-SCNC: 3.8 MMOL/L (ref 3.4–5.3)
PROT SERPL-MCNC: 5.6 G/DL (ref 6.4–8.3)
RBC # BLD AUTO: 3.73 10E6/UL (ref 3.8–5.2)
SODIUM SERPL-SCNC: 138 MMOL/L (ref 135–145)
WBC # BLD AUTO: 6.9 10E3/UL (ref 4–11)

## 2024-03-05 PROCEDURE — 96376 TX/PRO/DX INJ SAME DRUG ADON: CPT

## 2024-03-05 PROCEDURE — 250N000011 HC RX IP 250 OP 636: Performed by: INTERNAL MEDICINE

## 2024-03-05 PROCEDURE — G0378 HOSPITAL OBSERVATION PER HR: HCPCS

## 2024-03-05 PROCEDURE — 85027 COMPLETE CBC AUTOMATED: CPT | Performed by: INTERNAL MEDICINE

## 2024-03-05 PROCEDURE — 99232 SBSQ HOSP IP/OBS MODERATE 35: CPT | Performed by: INTERNAL MEDICINE

## 2024-03-05 PROCEDURE — 96361 HYDRATE IV INFUSION ADD-ON: CPT

## 2024-03-05 PROCEDURE — 83735 ASSAY OF MAGNESIUM: CPT | Performed by: INTERNAL MEDICINE

## 2024-03-05 PROCEDURE — 80053 COMPREHEN METABOLIC PANEL: CPT | Performed by: INTERNAL MEDICINE

## 2024-03-05 PROCEDURE — 250N000013 HC RX MED GY IP 250 OP 250 PS 637: Performed by: INTERNAL MEDICINE

## 2024-03-05 PROCEDURE — 36415 COLL VENOUS BLD VENIPUNCTURE: CPT | Performed by: INTERNAL MEDICINE

## 2024-03-05 PROCEDURE — C9113 INJ PANTOPRAZOLE SODIUM, VIA: HCPCS | Performed by: INTERNAL MEDICINE

## 2024-03-05 PROCEDURE — 120N000001 HC R&B MED SURG/OB

## 2024-03-05 PROCEDURE — 258N000003 HC RX IP 258 OP 636: Performed by: INTERNAL MEDICINE

## 2024-03-05 PROCEDURE — 250N000013 HC RX MED GY IP 250 OP 250 PS 637: Performed by: EMERGENCY MEDICINE

## 2024-03-05 RX ORDER — PANTOPRAZOLE SODIUM 40 MG/1
40 TABLET, DELAYED RELEASE ORAL
Status: DISCONTINUED | OUTPATIENT
Start: 2024-03-06 | End: 2024-03-06 | Stop reason: HOSPADM

## 2024-03-05 RX ORDER — DEXTROSE MONOHYDRATE 25 G/50ML
25-50 INJECTION, SOLUTION INTRAVENOUS
Status: DISCONTINUED | OUTPATIENT
Start: 2024-03-05 | End: 2024-03-05

## 2024-03-05 RX ORDER — NICOTINE POLACRILEX 4 MG
15-30 LOZENGE BUCCAL
Status: DISCONTINUED | OUTPATIENT
Start: 2024-03-05 | End: 2024-03-05

## 2024-03-05 RX ADMIN — POTASSIUM CITRATE 10 MEQ: 10 TABLET, EXTENDED RELEASE ORAL at 09:30

## 2024-03-05 RX ADMIN — DULOXETINE HYDROCHLORIDE 20 MG: 20 CAPSULE, DELAYED RELEASE ORAL at 09:28

## 2024-03-05 RX ADMIN — ACETAMINOPHEN 650 MG: 325 TABLET, FILM COATED ORAL at 09:40

## 2024-03-05 RX ADMIN — TRAZODONE HYDROCHLORIDE 50 MG: 50 TABLET ORAL at 21:13

## 2024-03-05 RX ADMIN — PANCRELIPASE 2 TABLET: 10440; 39150; 39150 TABLET ORAL at 09:29

## 2024-03-05 RX ADMIN — PANCRELIPASE 2 TABLET: 10440; 39150; 39150 TABLET ORAL at 12:29

## 2024-03-05 RX ADMIN — PANCRELIPASE 2 TABLET: 10440; 39150; 39150 TABLET ORAL at 17:59

## 2024-03-05 RX ADMIN — ESCITALOPRAM 20 MG: 5 TABLET, FILM COATED ORAL at 09:29

## 2024-03-05 RX ADMIN — SODIUM CHLORIDE: 9 INJECTION, SOLUTION INTRAVENOUS at 05:33

## 2024-03-05 RX ADMIN — POTASSIUM CITRATE 10 MEQ: 10 TABLET, EXTENDED RELEASE ORAL at 17:59

## 2024-03-05 RX ADMIN — DEXTROSE AND SODIUM CHLORIDE: 5; 900 INJECTION, SOLUTION INTRAVENOUS at 06:20

## 2024-03-05 RX ADMIN — PANTOPRAZOLE SODIUM 40 MG: 40 INJECTION, POWDER, FOR SOLUTION INTRAVENOUS at 09:27

## 2024-03-05 RX ADMIN — LEVOTHYROXINE SODIUM 150 MCG: 0.07 TABLET ORAL at 09:29

## 2024-03-05 RX ADMIN — ACETAMINOPHEN 650 MG: 325 TABLET, FILM COATED ORAL at 03:47

## 2024-03-05 ASSESSMENT — ACTIVITIES OF DAILY LIVING (ADL)
ADLS_ACUITY_SCORE: 23

## 2024-03-05 NOTE — PROGRESS NOTES
DATE/TIME OF CALL RECEIVED FROM LAB:  03/04/24 at 10:29 PM   LAB TEST:  Enteric Panel   LAB VALUE:  Positive for Norovirus and EPEC (enteropathogenic E. Coli)  PROVIDER NOTIFIED?: Yes  PROVIDER NAME: Jose Manuel  DATE/TIME LAB VALUE REPORTED TO PROVIDER: 3/4 2230  MECHANISM OF PROVIDER NOTIFICATION: Page  PROVIDER RESPONSE: Continue to provide supportive cares.

## 2024-03-05 NOTE — PLAN OF CARE
Goal Outcome Evaluation:    End of Shift Summary  For vital signs and complete assessments, please see documentation flowsheets.     Pertinent assessments:   Pt A&Ox4. VSS on RA. Up independently. C/o mild abdominal pain and HA, PRN tylenol given w/ some relief. Denies nausea, SOB. BS active, passing gas, no BM. Pt managing home subcutaneous insulin pump with basal rate and bolus for meals.     Major Shift Events: Diet advanced, tolerating. GI signed off.    Treatment Plan: Pain management, monitor bowel function. Glucose monitoring. Expectant discharge tomorrow.

## 2024-03-05 NOTE — PROGRESS NOTES
Cross cover.  Notified enteric panel positive for norovirus and enteropathogenic E. Coli.  -Continue supportive care.

## 2024-03-05 NOTE — PROGRESS NOTES
[facility]  Hospitalist Progress Note  Andrew Fowler M.D., M.B.A.   03/05/2024    Reason for Stay/active problem list    Acute gastroenteritis-infectious  Elevated LFTs         Assessment and Plan:        Summary of Stay:     Lynn Thompson is a 59 year old female with past medical history significant for chronic pancreatitis status post pancreatectomy and pancreatic islet transplant, history of bypass surgery, chronic kidney stones, diabetes mellitus, hypothyroidism, hypertension, GERD presented to the emergency room with sudden onset abdominal pain.  CT abdomen pelvis shows may be partial bowel obstruction versus gastroenteritis.  Labs show abnormal LFTs, elevated amylase.      Problem List with Assessment and Plan:      Acute infectious gastroenteritis  Presents with abdominal pain, back pain, nausea.  CT scan as well as stool studies indicative of acute gastroenteritis.  She tested positive for enteropathogenic E. coli and norovirus and stool studies.  Currently patient is much better with no abdominal pain but has some back pain.  No fevers.  Continue supportive care, monitor LFTs    Elevated LFTs    Suspect secondary to acute infectious gastroenteritis  LFTs improving.  GI consulted and input appreciated.  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  She is feeling well today.  No diarrhea overnight.  No abdominal pain but she has some back  "pain and headache.  No fever or chills.  Tests came back positive for norovirus and E. coli.  Will continue supportive care, may IV if she drinks enough amount of fluid.  LFTs improving           Physical Exam:        Last Vital Signs:  /50 (BP Location: Right arm)   Pulse 51   Temp 98.3  F (36.8  C) (Oral)   Resp 16   Ht 1.626 m (5' 4\")   Wt 60.8 kg (134 lb 0.6 oz)   LMP 12/19/2013   SpO2 93%   BMI 23.01 kg/m      No intake/output data recorded.    Wt Readings from Last 5 Encounters:   03/04/24 60.8 kg (134 lb 0.6 oz)   02/13/24 59.4 kg (131 lb)   01/02/24 59.9 kg (132 lb)   11/10/23 61.4 kg (135 lb 6.4 oz)   09/18/23 59.4 kg (130 lb 15.3 oz)        Constitutional: Awake, alert, cooperative, no apparent distress     Respiratory: Clear to auscultation bilaterally, no crackles or wheezing   Cardiovascular: Regular rate and rhythm, normal S1 and S2, and no murmur noted   Abdomen: Normal bowel sounds, soft, non-distended, non-tender   Skin: No new rashes, no cyanosis, dry to touch   Neuro: Alert with  no new focal weakness   Extremities: No edema   Other(s):        All other systems: Negative          Medications:        All current medications were reviewed with changes reflected in problem list.         Data:      All new lab and imaging data was reviewed.      Data reviewed today: I reviewed all new labs and imaging results over the last 24 hours. I personally reviewed       Recent Labs   Lab 03/05/24  0742 03/04/24  0725 03/03/24 2046   WBC 6.9 11.5* 6.2   HGB 11.3* 11.5* 12.7   HCT 35.1 36.4 40.2   MCV 94 94 95    298 346     No results for input(s): \"CULT\" in the last 168 hours.  Recent Labs   Lab 03/05/24  0742 03/05/24  0541 03/05/24  0156 03/04/24  1510 03/04/24  1504 03/04/24  0736 03/04/24  0725 03/04/24  0226 03/03/24  2046     --   --   --   --   --  140  --  140   POTASSIUM 3.8  --   --   --   --   --  5.0  --  4.7   CHLORIDE 104  --   --   --   --   --  107  --  105   CO2 24  " "--   --   --   --   --  27  --  26   ANIONGAP 10  --   --   --   --   --  6*  --  9   * 72 84   < >  --    < > 114*   < > 278*   BUN 11.4  --   --   --   --   --  18.5  --  21.9   CR 0.81  --   --   --   --   --  0.88  --  0.98*   GFRESTIMATED 82  --   --   --   --   --  74  --  65   VALARIE 8.4*  --   --   --   --   --  8.7*  --  9.7   MAG 2.0  --   --   --  2.7*  --  1.5*  --  1.8   PROTTOTAL 5.6*  --   --   --   --   --  6.0*  --  7.3   ALBUMIN 3.0*  --   --   --   --   --  3.6  --  4.4   BILITOTAL 0.3  --   --   --   --   --  0.3  --  0.4   ALKPHOS 189*  --   --   --   --   --  207*  --  268*   AST 88*  --   --   --   --   --  216*  --  770*   *  --   --   --   --   --  158*  --  235*    < > = values in this interval not displayed.       Recent Labs   Lab 03/05/24  0742 03/05/24  0541 03/05/24  0156 03/04/24  2211 03/04/24  1651   * 72 84 85 84       Recent Labs   Lab 03/03/24  2046   INR 0.97         No results for input(s): \"TROPONIN\", \"TROPI\", \"TROPR\" in the last 168 hours.    Invalid input(s): \"TROP\", \"TROPONINIES\"    Recent Results (from the past 48 hour(s))   Abd/pelvis CT,  IV  contrast only TRAUMA / AAA    Narrative    EXAM: CT ABDOMEN PELVIS W CONTRAST  LOCATION: [facility]  DATE: 3/3/2024    INDICATION: Abdominal pain.  COMPARISON: 06/30/2023.  TECHNIQUE: CT scan of the abdomen and pelvis was performed following injection of IV contrast. Multiplanar reformats were obtained. Dose reduction techniques were used.  CONTRAST: 65 mL Isovue 370.    FINDINGS:   LOWER CHEST: Normal.    HEPATOBILIARY: Liver is negative. Cholecystectomy. No biliary dilatation. Pneumobilia is present compatible with prior biliary intervention.    PANCREAS: Surgically absent.    SPLEEN: Surgically absent.    ADRENAL GLANDS: Normal.    KIDNEYS/BLADDER: Mild cortical scarring/atrophy left kidney. 1 cm nonobstructing stone lower pole left kidney. Right kidney is negative. No hydronephrosis. " Bladder is unremarkable.    BOWEL: Postoperative changes gastric bypass surgery and postoperative changes distal small bowel and base of the cecum with neoterminal ileum in the right lower quadrant. There are numerous fluid-filled loops of small bowel with multiple air-fluid levels   present. There are a few mildly dilated loops of small bowel but the majority of the bowel is normal in caliber. Right hemicolon is partially fluid-filled with air-fluid levels and normal in caliber. Overall, findings favored to be more likely due to a   nonspecific gastroenteritis with low-grade or partial mechanical obstruction felt to be less likely. No definite obstructing process seen. No bowel wall pneumatosis and no free air.    LYMPH NODES: Normal.    VASCULATURE: Unremarkable.    PELVIC ORGANS: Hysterectomy. No pelvic masses.    MUSCULOSKELETAL: Normal.      Impression    IMPRESSION:   1.  Extensive postoperative changes in the abdomen and pelvis including gastric bypass surgery and resection of the distal small bowel and a portion of the cecum.    2.  There are numerous fluid-filled loops of small bowel as well as partially fluid-filled right hemicolon with multiple scattered air-fluid levels present. The majority of the bowel is normal in caliber although there are a few scattered mildly dilated   loops of small bowel. Overall, findings are favored to more likely represent a nonspecific gastroenteritis. The possibility of low-grade or partial developing mechanical small bowel obstruction not completely excluded but felt to be less likely. An   obvious transition point or obstructing etiology is not seen on this study. Clinical correlation and follow-up as clinically warranted.    3.  Otherwise, no acute findings elsewhere in the abdomen or pelvis. See above for additional details.   US Abdomen Limited    Narrative    EXAM: US ABDOMEN LIMITED  LOCATION: [facility]  DATE: 3/4/2024    INDICATION: ruq pain;  concern for possible cbd dilation. lfts quite elevated  COMPARISON: CT 03/03/2024  TECHNIQUE: Limited abdominal ultrasound.    FINDINGS:    GALLBLADDER: Surgically removed.    BILE DUCTS: No biliary dilatation. Pneumobilia present, unchanged The common duct measures 5 mm.    LIVER: Normal parenchyma with smooth contour. No focal mass.    RIGHT KIDNEY: No hydronephrosis.    PANCREAS: Not identified, surgically absent.    No ascites.      Impression    IMPRESSION:  1.  No new abnormalities. Bile ducts appear within normal limits in size. Pneumobilia.           COVID Status:  COVID-19 PCR Results          4/17/2022    00:54 9/19/2022    20:29 6/30/2023    22:38 1/4/2024    01:19 3/3/2024    23:37 3/4/2024    10:16   COVID-19 PCR Results   SARS CoV2 PCR Negative  Negative  Negative  Negative  Negative  Negative      COVID-19 Antibody Results, Testing for Immunity           No data to display                 Disclaimer: This note consists of symbols derived from keyboarding, dictation and/or voice recognition software. As a result, there may be errors in the script that have gone undetected. Please consider this when interpreting information found in this chart.

## 2024-03-05 NOTE — PROGRESS NOTES
"GASTROENTEROLOGY PROGRESS NOTE     CC: Epigastric pain, nausea      SUBJECTIVE:  No new concerns. No abd pain, nausea or diarrhea.      OBJECTIVE:  General Appearance:  Not in distress  /50 (BP Location: Right arm)   Pulse 51   Temp 98.3  F (36.8  C) (Oral)   Resp 16   Ht 1.626 m (5' 4\")   Wt 60.8 kg (134 lb 0.6 oz)   LMP 2013   SpO2 93%   BMI 23.01 kg/m    Temp (24hrs), Av.3  F (37.4  C), Min:98.2  F (36.8  C), Max:101  F (38.3  C)    Patient Vitals for the past 72 hrs:   Weight   24 60.8 kg (134 lb 0.6 oz)   24 59 kg (130 lb)     No intake or output data in the 24 hours ending 24 1200     PHYSICAL EXAM  General: alert, oriented, NAD  SKIN: no suspicious lesions, rashes, jaundice, or spider angiomas   EYES: No scleral icterus   RESPIRATORY: Non labored breathing  GASTROINTESTINAL: Active bowel sounds,  abdomen soft and no tenderness on palpation.   NEURO:Grossly WNL.    Additional Comments:  ROS, FH, SH: See initial GI consult for details.          Recent Labs   Lab Test 24  1003 05/10/16  2019   WBC 6.9 11.5* 6.2   < > 8.3   HGB 11.3* 11.5* 12.7   < > 12.0   MCV 94 94 95   < > 92    298 346   < > 383   INR  --   --  0.97  --  1.03    < > = values in this interval not displayed.     Recent Labs   Lab Test 24   POTASSIUM 3.8 5.0 4.7   CHLORIDE 104 107 105   CO2 24 27 26   BUN 11.4 18.5 21.9   ANIONGAP 10 6* 9     Recent Labs   Lab Test 24  0742 24  0725 246 24  1944 23  1342 22  0759 21  2241 21  2136 21  0233 21  0208   ALBUMIN 3.0* 3.6  --  4.4   < >  --    < >  --    < > 3.4   < > 3.3*   BILITOTAL 0.3 0.3  --  0.4   < >  --    < >  --    < > 0.2   < > 0.2   * 158*  --  235*   < >  --    < >  --    < > 47   < > 30   AST 88* 216*  --  770*   < >  --    < >  --    < " > 41   < > 18   PROTEIN  --   --  Negative  --   --  30*  --  Negative   < >  --    < >  --    LIPASE  --   --   --  6*  --   --   --   --   --  10*  --  17*    < > = values in this interval not displayed.        MAGING/PROCEDURES:     CTAP   IMPRESSION:   1.  Extensive postoperative changes in the abdomen and pelvis including gastric bypass surgery and resection of the distal small bowel and a portion of the cecum.     2.  There are numerous fluid-filled loops of small bowel as well as partially fluid-filled right hemicolon with multiple scattered air-fluid levels present. The majority of the bowel is normal in caliber although there are a few scattered mildly dilated   loops of small bowel. Overall, findings are favored to more likely represent a nonspecific gastroenteritis. The possibility of low-grade or partial developing mechanical small bowel obstruction not completely excluded but felt to be less likely. An   obvious transition point or obstructing etiology is not seen on this study. Clinical correlation and follow-up as clinically warranted.     3.  Otherwise, no acute findings elsewhere in the abdomen or pelvis. See above for additional details.  I personally reviewed the patient's new imaging results.     US   Narrative & Impression   EXAM: US ABDOMEN LIMITED  LOCATION: [facility]  DATE: 3/4/2024     INDICATION: ruq pain; concern for possible cbd dilation. lfts quite elevated  COMPARISON: CT 03/03/2024  TECHNIQUE: Limited abdominal ultrasound.     FINDINGS:     GALLBLADDER: Surgically removed.     BILE DUCTS: No biliary dilatation. Pneumobilia present, unchanged The common duct measures 5 mm.     LIVER: Normal parenchyma with smooth contour. No focal mass.     RIGHT KIDNEY: No hydronephrosis.     PANCREAS: Not identified, surgically absent.     No ascites.                                                                      IMPRESSION:  1.  No new abnormalities. Bile ducts appear  within normal limits in size. Pneumobilia.         Problem list pertaining to GI:  Elevated liver enzymes and alk phos  Epigastric pain  Nausea  Pancreatitis, s/p pancreatectomy  Transfer of islet cells      Assessment: This is a 59 y-o female with extensive abdominal surgery. She is admitted for an evaluation of acute abdominal pain and nausea after eating food at [employer]. Labs notable for mild anemia, elevated liver enzymes and alk phos. Bili within normal limits. RUQ negative for biliary duct abnormality, s/p fabby. CT showed changes from previous surgeries and fluid filled small bowels. No other acute changes. Liver panel improved this morning. She is passing gas and had bowel movement today.      We thought her symptoms were from gastroenteritis. Stool test positive for norovirus and enteropathogenic e coli. She is asymptomatic at this point. No nausea pain, or diarrhea. Liver panel improving.     Plan:   Diet as tolerated  Okay to discharge from GI standpoint.   Follow-up with PCP in a couple of weeks to recheck liver panel.   Follow-up in GI if the liver panel is persistently high.   GI will sign off     I will discuss with Dr. Rodriguez    Time spent: 25 minutes, greater than 50% of the visit was spent in counseling/coordination of care.     Ping Cantrell CNP  [employer] [facility] ([facility])  Mobile # [phone]

## 2024-03-05 NOTE — PROGRESS NOTES
[id number]  VSS, pt on room air. Alert and oriented x 4. Up SBA. PIV running NS at 100 ml/hr. Pt received PRN IV dilaudid x 1 for abd pain at HS. Enteric panel back positive for norovirus and EPEC- no new orders per MD. NPO w/ ex meds, ice chips. Discharge pending.

## 2024-03-06 VITALS
RESPIRATION RATE: 20 BRPM | SYSTOLIC BLOOD PRESSURE: 138 MMHG | HEIGHT: 64 IN | TEMPERATURE: 99.8 F | BODY MASS INDEX: 22.88 KG/M2 | OXYGEN SATURATION: 98 % | DIASTOLIC BLOOD PRESSURE: 52 MMHG | WEIGHT: 134.04 LBS | HEART RATE: 61 BPM

## 2024-03-06 LAB
ALBUMIN SERPL BCG-MCNC: 3.4 G/DL (ref 3.5–5.2)
ALP SERPL-CCNC: 195 U/L (ref 40–150)
ALT SERPL W P-5'-P-CCNC: 85 U/L (ref 0–50)
ANION GAP SERPL CALCULATED.3IONS-SCNC: 8 MMOL/L (ref 7–15)
AST SERPL W P-5'-P-CCNC: 43 U/L (ref 0–45)
BILIRUB SERPL-MCNC: 0.2 MG/DL
BUN SERPL-MCNC: 12.1 MG/DL (ref 8–23)
CALCIUM SERPL-MCNC: 8.8 MG/DL (ref 8.8–10.2)
CHLORIDE SERPL-SCNC: 104 MMOL/L (ref 98–107)
CREAT SERPL-MCNC: 0.8 MG/DL (ref 0.51–0.95)
DEPRECATED HCO3 PLAS-SCNC: 27 MMOL/L (ref 22–29)
EGFRCR SERPLBLD CKD-EPI 2021: 83 ML/MIN/1.73M2
GLUCOSE BLDC GLUCOMTR-MCNC: 104 MG/DL (ref 70–99)
GLUCOSE BLDC GLUCOMTR-MCNC: 137 MG/DL (ref 70–99)
GLUCOSE SERPL-MCNC: 97 MG/DL (ref 70–99)
HGB BLD-MCNC: 11.4 G/DL (ref 11.7–15.7)
MAGNESIUM SERPL-MCNC: 1.5 MG/DL (ref 1.7–2.3)
POTASSIUM SERPL-SCNC: 4.1 MMOL/L (ref 3.4–5.3)
PROT SERPL-MCNC: 6 G/DL (ref 6.4–8.3)
SODIUM SERPL-SCNC: 139 MMOL/L (ref 135–145)

## 2024-03-06 PROCEDURE — 96376 TX/PRO/DX INJ SAME DRUG ADON: CPT

## 2024-03-06 PROCEDURE — 250N000013 HC RX MED GY IP 250 OP 250 PS 637: Performed by: INTERNAL MEDICINE

## 2024-03-06 PROCEDURE — 80053 COMPREHEN METABOLIC PANEL: CPT | Performed by: INTERNAL MEDICINE

## 2024-03-06 PROCEDURE — 99239 HOSP IP/OBS DSCHRG MGMT >30: CPT | Performed by: INTERNAL MEDICINE

## 2024-03-06 PROCEDURE — G0378 HOSPITAL OBSERVATION PER HR: HCPCS

## 2024-03-06 PROCEDURE — 85018 HEMOGLOBIN: CPT | Performed by: INTERNAL MEDICINE

## 2024-03-06 PROCEDURE — 250N000013 HC RX MED GY IP 250 OP 250 PS 637: Performed by: EMERGENCY MEDICINE

## 2024-03-06 PROCEDURE — 250N000011 HC RX IP 250 OP 636: Performed by: INTERNAL MEDICINE

## 2024-03-06 PROCEDURE — 36415 COLL VENOUS BLD VENIPUNCTURE: CPT | Performed by: INTERNAL MEDICINE

## 2024-03-06 PROCEDURE — 83735 ASSAY OF MAGNESIUM: CPT | Performed by: INTERNAL MEDICINE

## 2024-03-06 RX ORDER — MAGNESIUM SULFATE HEPTAHYDRATE 40 MG/ML
4 INJECTION, SOLUTION INTRAVENOUS ONCE
Qty: 100 ML | Refills: 0 | Status: COMPLETED | OUTPATIENT
Start: 2024-03-06 | End: 2024-03-06

## 2024-03-06 RX ADMIN — PANCRELIPASE 2 TABLET: 10440; 39150; 39150 TABLET ORAL at 11:48

## 2024-03-06 RX ADMIN — PANTOPRAZOLE SODIUM 40 MG: 40 TABLET, DELAYED RELEASE ORAL at 08:52

## 2024-03-06 RX ADMIN — POTASSIUM CITRATE 10 MEQ: 10 TABLET, EXTENDED RELEASE ORAL at 08:52

## 2024-03-06 RX ADMIN — LEVOTHYROXINE SODIUM 150 MCG: 0.07 TABLET ORAL at 08:52

## 2024-03-06 RX ADMIN — HYDROMORPHONE HYDROCHLORIDE 0.5 MG: 1 INJECTION, SOLUTION INTRAMUSCULAR; INTRAVENOUS; SUBCUTANEOUS at 02:46

## 2024-03-06 RX ADMIN — ESCITALOPRAM 20 MG: 5 TABLET, FILM COATED ORAL at 08:52

## 2024-03-06 RX ADMIN — MAGNESIUM SULFATE HEPTAHYDRATE 4 G: 4 INJECTION, SOLUTION INTRAVENOUS at 10:12

## 2024-03-06 RX ADMIN — DULOXETINE HYDROCHLORIDE 20 MG: 20 CAPSULE, DELAYED RELEASE ORAL at 08:52

## 2024-03-06 RX ADMIN — PANCRELIPASE 2 TABLET: 10440; 39150; 39150 TABLET ORAL at 08:52

## 2024-03-06 ASSESSMENT — ACTIVITIES OF DAILY LIVING (ADL)
ADLS_ACUITY_SCORE: 22
ADLS_ACUITY_SCORE: 23

## 2024-03-06 NOTE — PLAN OF CARE
[id number]:     A&Ox4. Up independent in room. Denies pain and nausea. No stools. Mag 1.5, replacement started. Dr. Janeth prieto for patient to discharge without having mag recheck and will follow up with PCP. Given insulin self with insulin pump, charted in MAR. Plan to discharge this afternoon.

## 2024-03-06 NOTE — DISCHARGE SUMMARY
Discharge Note    Patient discharged to home via private vehicle  accompanied by significant other .  IV: Discontinued  Prescriptions N/A.   Belongings reviewed and sent with patient.   Home medications returned to patient: NA  Equipment sent with:  N/A.   patient verbalizes understanding of discharge instructions. AVS given to patient.  Additional education completed?  NA

## 2024-03-06 NOTE — PLAN OF CARE
Goal Outcome Evaluation:       Pt is alert and oriented x4. On room air. VSS. Lung sounds clear. Denies N/V. Abdominal pain- dilaudid was given which she states that it helped. Independent in room. Regular diet. BS- 104. Last BM- 3/4. Enteric precautions maintained.

## 2024-03-06 NOTE — PLAN OF CARE
Goal Outcome Evaluation:      Plan of Care Reviewed With: patient    Overall Patient Progress: improvingOverall Patient Progress: improving  To Do:  End of Shift Summary  For vital signs and complete assessments, please see documentation flowsheets.     Pertinent assessments: Assumed cares [id number]  Pt A&Ox4. VSS on RA. Up independently. C/o mild abdominal pain declined medication intervention. Denies nausea, SOB. BS active, passing gas, no BM. Pt managing home subcutaneous insulin pump with basal rate and bolus for meals. GB on this shift is 168    Major Shift Events: none    Treatment Plan: Pain management, monitor bowel function. Glucose monitoring. Expectant discharge tomorrow.    Bedside Nurse: Shiva Cha, RN 03/05/2024

## 2024-03-07 NOTE — DISCHARGE SUMMARY
Physician Discharge Summary           [place] Hospitalist Discharge Summary-[facility]    Name: Lynn Thompson    MRN: [id number]     YOB: 1965    Age: 59 year old                                                     Primary care provider: Maryana Brooks    Admit date:  3/3/2024    Discharge date and time: 3/6/2024 12:50 PM    Discharge Physician: Andrew Fowler M.D., M.B.A.       Primary Discharge Diagnosis        Acute gastroenteritis-infectious  Elevated LFTs      Secondary Diagnosis /chronic medical conditions     Past Medical History:   Diagnosis Date    Benign paroxysmal positional vertigo     occ.     Calculus of kidney 05/2005    x1 on L side passed, several stones.  Has been tested for oxalate.    Chronic abdominal pain 2013    Chronic pancreatitis 2013    Depression     also occ panic spells    Diabetes (H) 5/10/2013    Total Pancreatomy with Auto Islets Transplant    Dyspepsia 1999    H. pylori   treated    Dysthymia 2016    Headaches     still periodic HA's ;  often 5X/week    Hypertension 2016    Stress related    Iron deficiency anemia 2003    relates to gastric bypass    Post-pancreatectomy diabetes melltius 2013    Sleep apnea     uses splint    Spasm of sphincter of Oddi     surgical + endoscopic stenting of pancreatic duct @ [facility] [place] [facility]    Thyroid nodule 2016     Past Surgical History:  Past Surgical History:   Procedure Laterality Date    ABDOMINOPLASTY  2002    Tummy tuck    APPENDECTOMY  1990    BUNIONECTOMY Right 1998    CBD Stent placement  2002    CBD stent; Dr. Presley     SECTION      CHOLECYSTECTOMY      COLONOSCOPY N/A 2021    Procedure: COLONOSCOPY INCOMPLETE Aborted due to incomplete prep  will need to take additional prep and return tomorrow 21;  Surgeon: Ihsan Saenz MD;  Location:  GI    COMBINED CYSTOSCOPY, RETROGRADES, URETEROSCOPY, LASER HOLMIUM LITHOTRIPSY  URETER(S), INSERT STENT Right 03/23/2015    Procedure: COMBINED CYSTOSCOPY, RETROGRADES, URETEROSCOPY, LASER HOLMIUM LITHOTRIPSY URETER(S), INSERT STENT;  Surgeon: Kennedi Aldana MD;  Location: [facility] OR    COMBINED CYSTOSCOPY, RETROGRADES, URETEROSCOPY, LASER HOLMIUM LITHOTRIPSY URETER(S), INSERT STENT Right 04/20/2015    Procedure: COMBINED CYSTOSCOPY, RETROGRADES, URETEROSCOPY, LASER HOLMIUM LITHOTRIPSY URETER(S), INSERT STENT;  Surgeon: Kennedi Aldana MD;  Location: [facility] OR    COSMETIC SURGERY  6/24/2002    Tummy tuck    CYSTECTOMY OVARIAN BENIGN Right     CYSTOSCOPY, RETROGRADES, INSERT STENT URETER(S), COMBINED  10/02/2012    Procedure: COMBINED CYSTOSCOPY, RETROGRADES, INSERT STENT URETER(S);  COMBINED CYSTOSCOPY,  , INSERT LEFT STENT URETER;  Surgeon: Johny Baez MD;  Location: [facility] OR    CYSTOSCOPY, RETROGRADES, INSERT STENT URETER(S), COMBINED Left 9/20/2022    Procedure: Cystoscopy with left retrograde pyelogram, left ureteroscopy, thulium laser lithotripsy of left ureteral calculus, stone basketing and left ureteral stent insertion;  Surgeon: Alonzo Rome MD;  Location: [facility] OR    ESOPHAGOSCOPY, GASTROSCOPY, DUODENOSCOPY (EGD), COMBINED N/A 01/24/2018    Procedure: COMBINED ESOPHAGOSCOPY, GASTROSCOPY, DUODENOSCOPY (EGD);  ESOPHAGOSCOPY, GASTROSCOPY, DUODENOSCOPY (EGD)    ;  Surgeon: Tamir Rodgers MD;  Location: [facility] GI    EXTRACORPOREAL SHOCK WAVE LITHOTRIPSY (ESWL)  10/16/2012    Procedure: EXTRACORPOREAL SHOCK WAVE LITHOTRIPSY (ESWL);  left EXTRACORPOREAL SHOCK WAVE LITHOTRIPSY (ESWL) ;  Surgeon: Johny Baez MD;  Location: [facility] OR    Gastric bypass NOS      HERNIA REPAIR  02/2015    HYSTERECTOMY SUPRACERVICAL, BILATERAL SALPINGO-OOPHORECTOMY, COMBINED N/A 02/01/2022    Procedure: Abdominal supracervical hysterectomy, bilateral salpingooophrectomy;  Surgeon: Nicole Rivera MD;  [facility]    IRRIGATION AND DEBRIDEMENT HAND, COMBINED Left 10/30/2020    Procedure:  Left hand sharp excisional debridement of skin, subcutaneous tissue and fat with a scalpel, 2 x 1 x 1 cm.;  Surgeon: Demian Renteria MD;  Location: [facility] OR    LAPAROSCOPIC LYSIS ADHESIONS N/A 02/20/2015    Procedure: LAPAROSCOPIC LYSIS ADHESIONS;  Surgeon: Aaron Early MD;  [facility]    LAPAROSCOPIC LYSIS ADHESIONS N/A 12/29/2015    Procedure: LAPAROSCOPIC LYSIS ADHESIONS;  Surgeon: Aaron Early MD;  [facility]    PANCREATECTOMY, TRANSPLANT AUTO ISLET CELL, COMBINED  05/10/2013    Procedure: COMBINED PANCREATECTOMY, TRANSPLANT AUTO ISLET CELL;  Pancreatectomy, Auto Islet Cell Transplant   hernia repair, jejunostomy tube and liver biopsies with Anesthesia General with block;  Surgeon: Aaron Early MD;  [facility]    Partial ileum resection  1992    RECTOPEXY ABDOMINAL N/A 02/01/2022    Procedure: RECTOPEXY, ABDOMINAL;  Surgeon: Uriah Sheridan MD;  [facility]    REPAIR PTOSIS BROW BILATERAL Bilateral 06/09/2020    Procedure: BILATERAL BROW PTOSIS REPAIR;  Surgeon: Denise Alberts MD;  Location: [facility] OR    SACROCOLPOPEXY, CYSTOSCOPY, COMBINED N/A 02/01/2022    Procedure: Uterosacral ligament suspension, pina colposuspension with Cystoscopy;  Surgeon: Nicole Rivera MD;  [facility]    SIGMOIDOSCOPY FLEXIBLE N/A 02/01/2022    Procedure: SIGMOIDOSCOPY, FLEXIBLE;  Surgeon: Uriah Sheridan MD;  [facility]    Surgery for SBO  2015    TONSILLECTOMY, ADENOIDECTOMY, COMBINED  1997    TRANSPLANT  5/10/13    Pancreatic Auto-Islet Transplant           Brief Summary of Hospital stay :       Please refer to  Admission H&P note  and subsequent progress notes in EMR for full details of patient care.    Reason for Hospitalization(C/C,HPI and brief patient summary):abdominal pain       Significant findings(Primary diagnosis )Procedures and treatments provided(Hospital course ,consults, procedures):Please see below for details    Lynn MILADY Hallon is a 59 year  "old female with past medical history significant for chronic pancreatitis status post pancreatectomy and pancreatic islet transplant, history of bypass surgery, chronic kidney stones, diabetes mellitus, hypothyroidism, hypertension, GERD presented to the emergency room with sudden onset abdominal pain.  CT abdomen pelvis shows may be partial bowel obstruction versus gastroenteritis.  Labs show abnormal LFTs, elevated amylase.         Problem list (medical problems addressed during hospital stay):        Acute infectious gastroenteritis  Presents with abdominal pain, back pain, nausea.  CT scan as well as stool studies indicative of acute gastroenteritis.  She tested positive for enteropathogenic E. coli and norovirus and stool studies.  Currently patient is much better with no abdominal pain b No fevers.  Continue supportive care, monitor LFTs with PCP      Elevated LFTs     Suspect secondary to acute infectious gastroenteritis  LFTs improving.  GI consulted and input appreciated.       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  Hypothyroidism  -Continue levothyroxine  History of GERD  Anxiety and depression        Consultations during hospital stay:       GASTROENTEROLOGY IP CONSULT  PHARMACY IP CONSULT      Patient discharge Condition:     stable    /52 (BP Location: Right arm)   Pulse 61   Temp 99.8  F (37.7  C) (Oral)   Resp 20   Ht 1.626 m (5' 4\")   Wt 60.8 kg (134 lb 0.6 oz)   LMP 12/19/2013   SpO2 98%   BMI 23.01 kg/m           Discharge Instructions:       Patient/family instructions: Written discharge instruction given to patient/family    Discharge Medications:       Review of your medicines        CONTINUE these medicines which may have CHANGED, or have new prescriptions. If we are uncertain of the size of tablets/capsules you have at home, strength may be listed as something that might have changed.        Dose / Directions   insulin glargine " 100 UNIT/ML pen  Commonly known as: LANTUS PEN  This may have changed:   how to take this  when to take this  reasons to take this  additional instructions  Used for: Post-pancreatectomy diabetes (H)      Inject 6 units daily in the event of pump failure  Quantity: 15 mL  Refills: 0            CONTINUE these medicines which have NOT CHANGED        Dose / Directions   calcium carbonate 600 mg-vitamin D 400 units 600-400 MG-UNIT per tablet  Commonly known as: CALTRATE      Dose: 1 tablet  Take 1 tablet by mouth 2 times daily  Refills: 0     Dexcom G6 Sensor Misc  Used for: Post-pancreatectomy diabetes (H)      CHANGE EVERY 10 DAYS  Quantity: 3 each  Refills: 5     Dexcom G6 Transmitter Misc  Used for: Post-pancreatectomy diabetes (H)      CHANGE EVERY 90 DAYS  Quantity: 1 each  Refills: 1     DULoxetine 20 MG capsule  Commonly known as: CYMBALTA      Dose: 20 mg  Take 20 mg by mouth daily  Refills: 0     escitalopram 20 MG tablet  Commonly known as: LEXAPRO      Dose: 20 mg  Take 20 mg by mouth daily  Refills: 0     famotidine 40 MG tablet  Commonly known as: PEPCID  Used for: Gastroesophageal reflux disease without esophagitis      Dose: 40 mg  Take 1 tablet (40 mg) by mouth 2 times daily as needed for heartburn  Quantity: 180 tablet  Refills: 3     * Fiasp PenFill 100 UNIT/ML Soct  Used for: Post-pancreatectomy diabetes (H)  Generic drug: Insulin Aspart (w/Niacinamide)      Inject 0.5-4 Units Subcutaneous 4 times daily, INJECT with meals and nightly  Quantity: 15 mL  Refills: 0     * Fiasp 100 UNIT/ML Soln  Used for: Post-pancreatectomy diabetes (H)  Generic drug: Insulin Aspart (w/Niacinamide)      Inject 15 units daily via insulin pump. May use up to 85 units daily.  Quantity: 90 mL  Refills: 0     gabapentin 300 MG capsule  Commonly known as: NEURONTIN      Dose: 300 mg  Take 300 mg by mouth nightly as needed  Refills: 0     Gvoke HypoPen 1-Pack 1 MG/0.2ML pen  Used for: Post-pancreatectomy diabetes (H)  Generic  drug: Glucagon      Dose: 1 mg  Inject 1 mg Subcutaneous once as needed (for hypoglycemia with loss of consciousness)  Quantity: 15 mL  Refills: 5     hydrOXYzine HCl 25 MG tablet  Commonly known as: ATARAX      Dose: 25 mg  Take 25 mg by mouth daily as needed  Refills: 0     insulin pump infusion      Inject Subcutaneous continuous Date Last Updated on chart: 3/4/2024  Omnipod 5, type of insulin: Fiasp  Change site every 3 days  Basal Rates in units/hr  3622-2313: 1 unit/hr  ICF (insulin to carb ratio): 12 (1 unit covers 12g carbs)  ISF (insulin sensitivity factor): 140-170 (1 unit lowers BG by 140-170mg/dL)- very sernsitive  Target BG: Upper: 180, lower: 70  Refills: 0     levothyroxine 150 MCG tablet  Commonly known as: SYNTHROID/LEVOTHROID  Used for: Acquired hypothyroidism      Dose: 150 mcg  Take 1 tablet (150 mcg) by mouth daily  Quantity: 30 tablet  Refills: 11     lipase-protease-amylase 27568-61389-80133 units Tabs tablet  Commonly known as: Viokace  Used for: Pancreatic insufficiency      Take 5 with meals and 3 with snacks; approximately daily maximum of 20 capsules  Quantity: 500 tablet  Refills: 11     loratadine 10 MG tablet  Commonly known as: CLARITIN  Indication: Hayfever      Dose: 10 mg  Take 10 mg by mouth daily as needed  Refills: 0     omeprazole 40 MG DR capsule  Commonly known as: PriLOSEC  Used for: Gastroesophageal reflux disease without esophagitis      Dose: 40 mg  Take 1 capsule (40 mg) by mouth 2 times daily Take 30-60 minutes before a meal.  Quantity: 180 capsule  Refills: 0     Omnipod 5 G6 Pods (Gen 5) Misc  Used for: Post-pancreatectomy diabetes (H)      CHANGE EVERY 3 DAYS  Quantity: 30 each  Refills: 5     potassium citrate 10 MEQ (1080 MG) CR tablet  Commonly known as: UROCIT-K      Dose: 10 mEq  Take 10 mEq by mouth 2 times daily (with meals)  Refills: 0     traZODone 50 MG tablet  Commonly known as: DESYREL      Dose: 50 mg  Take 50 mg by mouth at bedtime  Refills: 0            * This list has 2 medication(s) that are the same as other medications prescribed for you. Read the directions carefully, and ask your doctor or other care provider to review them with you.                   Discharge diet:Orders Placed This Encounter      Diet    diabetic diet      Discharge activity:Activity as tolerated      Discharge follow-up:    Follow up with primary care provider in 7 days or earlier if symptoms return or gets worse.    Other instructions:    We discussed with patient/family about detail discharge instructions as well as discharge medications above including potential risks,side effects and benefits.Patient/family understood benefits and potential serious side effects of taking these medications and need to follow up with PCP if the patient develops complications.  Patient is also advised to see a doctor immediately for severe symptoms.        Major procedure performed/  Significant Diagnostic Studies:       Results for orders placed or performed during the hospital encounter of 03/03/24   Abd/pelvis CT,  IV  contrast only TRAUMA / AAA    Narrative    EXAM: CT ABDOMEN PELVIS W CONTRAST  LOCATION: [facility]  DATE: 3/3/2024    INDICATION: Abdominal pain.  COMPARISON: 06/30/2023.  TECHNIQUE: CT scan of the abdomen and pelvis was performed following injection of IV contrast. Multiplanar reformats were obtained. Dose reduction techniques were used.  CONTRAST: 65 mL Isovue 370.    FINDINGS:   LOWER CHEST: Normal.    HEPATOBILIARY: Liver is negative. Cholecystectomy. No biliary dilatation. Pneumobilia is present compatible with prior biliary intervention.    PANCREAS: Surgically absent.    SPLEEN: Surgically absent.    ADRENAL GLANDS: Normal.    KIDNEYS/BLADDER: Mild cortical scarring/atrophy left kidney. 1 cm nonobstructing stone lower pole left kidney. Right kidney is negative. No hydronephrosis. Bladder is unremarkable.    BOWEL: Postoperative changes gastric bypass  surgery and postoperative changes distal small bowel and base of the cecum with neoterminal ileum in the right lower quadrant. There are numerous fluid-filled loops of small bowel with multiple air-fluid levels   present. There are a few mildly dilated loops of small bowel but the majority of the bowel is normal in caliber. Right hemicolon is partially fluid-filled with air-fluid levels and normal in caliber. Overall, findings favored to be more likely due to a   nonspecific gastroenteritis with low-grade or partial mechanical obstruction felt to be less likely. No definite obstructing process seen. No bowel wall pneumatosis and no free air.    LYMPH NODES: Normal.    VASCULATURE: Unremarkable.    PELVIC ORGANS: Hysterectomy. No pelvic masses.    MUSCULOSKELETAL: Normal.      Impression    IMPRESSION:   1.  Extensive postoperative changes in the abdomen and pelvis including gastric bypass surgery and resection of the distal small bowel and a portion of the cecum.    2.  There are numerous fluid-filled loops of small bowel as well as partially fluid-filled right hemicolon with multiple scattered air-fluid levels present. The majority of the bowel is normal in caliber although there are a few scattered mildly dilated   loops of small bowel. Overall, findings are favored to more likely represent a nonspecific gastroenteritis. The possibility of low-grade or partial developing mechanical small bowel obstruction not completely excluded but felt to be less likely. An   obvious transition point or obstructing etiology is not seen on this study. Clinical correlation and follow-up as clinically warranted.    3.  Otherwise, no acute findings elsewhere in the abdomen or pelvis. See above for additional details.   US Abdomen Limited    Narrative    EXAM: US ABDOMEN LIMITED  LOCATION: [facility]  DATE: 3/4/2024    INDICATION: ruq pain; concern for possible cbd dilation. lfts quite elevated  COMPARISON: CT  "03/03/2024  TECHNIQUE: Limited abdominal ultrasound.    FINDINGS:    GALLBLADDER: Surgically removed.    BILE DUCTS: No biliary dilatation. Pneumobilia present, unchanged The common duct measures 5 mm.    LIVER: Normal parenchyma with smooth contour. No focal mass.    RIGHT KIDNEY: No hydronephrosis.    PANCREAS: Not identified, surgically absent.    No ascites.      Impression    IMPRESSION:  1.  No new abnormalities. Bile ducts appear within normal limits in size. Pneumobilia.         *Note: Due to a large number of results and/or encounters for the requested time period, some results have not been displayed. A complete set of results can be found in Results Review.       Recent Labs   Lab 03/06/24  0656 03/05/24  0742 03/04/24  0725 03/03/24  2046   WBC  --  6.9 11.5* 6.2   HGB 11.4* 11.3* 11.5* 12.7   HCT  --  35.1 36.4 40.2   MCV  --  94 94 95   PLT  --  275 298 346     No results for input(s): \"CULT\" in the last 168 hours.  Recent Labs   Lab 03/06/24  1158 03/06/24  0656 03/06/24  0204 03/05/24  0940 03/05/24  0742 03/04/24  1510 03/04/24  1504 03/04/24  0736 03/04/24  0725   NA  --  139  --   --  138  --   --   --  140   POTASSIUM  --  4.1  --   --  3.8  --   --   --  5.0   CHLORIDE  --  104  --   --  104  --   --   --  107   CO2  --  27  --   --  24  --   --   --  27   ANIONGAP  --  8  --   --  10  --   --   --  6*   * 97 104*   < > 104*   < >  --    < > 114*   BUN  --  12.1  --   --  11.4  --   --   --  18.5   CR  --  0.80  --   --  0.81  --   --   --  0.88   GFRESTIMATED  --  83  --   --  82  --   --   --  74   VALARIE  --  8.8  --   --  8.4*  --   --   --  8.7*   MAG  --  1.5*  --   --  2.0  --  2.7*  --  1.5*   PROTTOTAL  --  6.0*  --   --  5.6*  --   --   --  6.0*   ALBUMIN  --  3.4*  --   --  3.0*  --   --   --  3.6   BILITOTAL  --  0.2  --   --  0.3  --   --   --  0.3   ALKPHOS  --  195*  --   --  189*  --   --   --  207*   AST  --  43  --   --  88*  --   --   --  216*   ALT  --  85*  --   --  108* "  --   --   --  158*    < > = values in this interval not displayed.       Recent Labs   Lab 03/06/24  1158 03/06/24  0656 03/06/24  0204 03/05/24  2106 03/05/24  1411   * 97 104* 168* 212*       Recent Labs   Lab 03/03/24  2046   INR 0.97         Pending Results:       Unresulted Labs Ordered in the Past 30 Days of this Admission       Date and Time Order Name Status Description    3/4/2024  7:48 AM Blood Culture Arm, Right Preliminary     3/4/2024  7:48 AM Blood Culture Hand, Left Preliminary                Patient Allergies:       Allergies   Allergen Reactions    Corticosteroids Other (See Comments)     All oral, IV and injectable steroids are contraindicated (unless in life threatening situations) in Islet Auto transplant recipients. They can cause irreversible loss of islet cell function. Please contact patient's transplant care coordinator, Erlinda Multani RN BSN at [phone]/pager: [phone] and endocrinologist prior to administration.      Povidone Iodine Hives     Causes skin to blister    Nsaids      naprosyn = GI upset    Sulfasalazine Nausea and Nausea and Vomiting         Disposition:     Disposition: home    I saw and evaluated the patient on day of discharge and  discharge instructions reviewed  and  all the patient's questions and concerns addressed. Over 30 minutes spent on discharge and coordination of discharge process for this patient.      Disclaimer: This note consists of symbols derived from keyboarding, dictation and/or voice recognition software. As a result, there may be errors in the script that have gone undetected. Please consider this when interpreting information found in this chart

## 2024-03-09 LAB
BACTERIA BLD CULT: NO GROWTH
BACTERIA BLD CULT: NO GROWTH

## 2024-03-13 ENCOUNTER — MYC REFILL (OUTPATIENT)
Dept: FAMILY MEDICINE | Facility: CLINIC | Age: 59
End: 2024-03-13
Payer: COMMERCIAL

## 2024-03-13 DIAGNOSIS — K21.9 GASTROESOPHAGEAL REFLUX DISEASE WITHOUT ESOPHAGITIS: ICD-10-CM

## 2024-03-13 RX ORDER — FAMOTIDINE 40 MG/1
40 TABLET, FILM COATED ORAL 2 TIMES DAILY PRN
Qty: 180 TABLET | Refills: 0 | Status: SHIPPED | OUTPATIENT
Start: 2024-03-13 | End: 2024-04-12

## 2024-04-05 SDOH — HEALTH STABILITY: PHYSICAL HEALTH: ON AVERAGE, HOW MANY DAYS PER WEEK DO YOU ENGAGE IN MODERATE TO STRENUOUS EXERCISE (LIKE A BRISK WALK)?: 3 DAYS

## 2024-04-05 SDOH — HEALTH STABILITY: PHYSICAL HEALTH: ON AVERAGE, HOW MANY MINUTES DO YOU ENGAGE IN EXERCISE AT THIS LEVEL?: 30 MIN

## 2024-04-05 ASSESSMENT — SOCIAL DETERMINANTS OF HEALTH (SDOH): HOW OFTEN DO YOU GET TOGETHER WITH FRIENDS OR RELATIVES?: TWICE A WEEK

## 2024-04-08 ENCOUNTER — TRANSFERRED RECORDS (OUTPATIENT)
Dept: HEALTH INFORMATION MANAGEMENT | Facility: CLINIC | Age: 59
End: 2024-04-08
Payer: COMMERCIAL

## 2024-04-12 ENCOUNTER — OFFICE VISIT (OUTPATIENT)
Dept: FAMILY MEDICINE | Facility: CLINIC | Age: 59
End: 2024-04-12
Payer: COMMERCIAL

## 2024-04-12 VITALS
WEIGHT: 125 LBS | DIASTOLIC BLOOD PRESSURE: 81 MMHG | SYSTOLIC BLOOD PRESSURE: 125 MMHG | RESPIRATION RATE: 14 BRPM | OXYGEN SATURATION: 96 % | BODY MASS INDEX: 21.34 KG/M2 | HEIGHT: 64 IN | HEART RATE: 94 BPM | TEMPERATURE: 99.6 F

## 2024-04-12 DIAGNOSIS — Z12.4 CERVICAL CANCER SCREENING: ICD-10-CM

## 2024-04-12 DIAGNOSIS — Z90.711 S/P PARTIAL HYSTERECTOMY: ICD-10-CM

## 2024-04-12 DIAGNOSIS — K21.9 GASTROESOPHAGEAL REFLUX DISEASE WITHOUT ESOPHAGITIS: ICD-10-CM

## 2024-04-12 DIAGNOSIS — Z90.410 POST-PANCREATECTOMY DIABETES (H): ICD-10-CM

## 2024-04-12 DIAGNOSIS — Z78.0 ASYMPTOMATIC MENOPAUSE: ICD-10-CM

## 2024-04-12 DIAGNOSIS — Z00.00 ENCOUNTER FOR MEDICARE ANNUAL WELLNESS EXAM: Primary | ICD-10-CM

## 2024-04-12 DIAGNOSIS — E03.9 ACQUIRED HYPOTHYROIDISM: ICD-10-CM

## 2024-04-12 DIAGNOSIS — E13.9 POST-PANCREATECTOMY DIABETES (H): ICD-10-CM

## 2024-04-12 DIAGNOSIS — E89.1 POST-PANCREATECTOMY DIABETES (H): ICD-10-CM

## 2024-04-12 PROBLEM — R74.01 TRANSAMINITIS: Status: RESOLVED | Noted: 2024-03-04 | Resolved: 2024-04-12

## 2024-04-12 PROBLEM — N10 ACUTE PYELONEPHRITIS: Status: RESOLVED | Noted: 2023-06-30 | Resolved: 2024-04-12

## 2024-04-12 PROBLEM — N81.4 UTEROVAGINAL PROLAPSE, UNSPECIFIED: Status: RESOLVED | Noted: 2021-11-03 | Resolved: 2024-04-12

## 2024-04-12 PROBLEM — R10.9 ABDOMINAL PAIN, UNSPECIFIED ABDOMINAL LOCATION: Status: RESOLVED | Noted: 2024-03-04 | Resolved: 2024-04-12

## 2024-04-12 PROBLEM — K56.609 SMALL BOWEL OBSTRUCTION (H): Status: RESOLVED | Noted: 2021-11-23 | Resolved: 2024-04-12

## 2024-04-12 PROBLEM — Z87.42 HISTORY OF UTERINE PROLAPSE: Status: RESOLVED | Noted: 2023-01-12 | Resolved: 2024-04-12

## 2024-04-12 PROBLEM — K62.3 RECTAL PROLAPSE: Status: RESOLVED | Noted: 2021-11-03 | Resolved: 2024-04-12

## 2024-04-12 PROCEDURE — G0145 SCR C/V CYTO,THINLAYER,RESCR: HCPCS | Performed by: FAMILY MEDICINE

## 2024-04-12 PROCEDURE — 99214 OFFICE O/P EST MOD 30 MIN: CPT | Mod: 25 | Performed by: FAMILY MEDICINE

## 2024-04-12 PROCEDURE — 99396 PREV VISIT EST AGE 40-64: CPT | Mod: 25 | Performed by: FAMILY MEDICINE

## 2024-04-12 PROCEDURE — 90677 PCV20 VACCINE IM: CPT | Performed by: FAMILY MEDICINE

## 2024-04-12 PROCEDURE — 87624 HPV HI-RISK TYP POOLED RSLT: CPT | Performed by: FAMILY MEDICINE

## 2024-04-12 PROCEDURE — 99207 PR FOOT EXAM NO CHARGE: CPT | Performed by: FAMILY MEDICINE

## 2024-04-12 PROCEDURE — 90471 IMMUNIZATION ADMIN: CPT | Performed by: FAMILY MEDICINE

## 2024-04-12 RX ORDER — FAMOTIDINE 40 MG/1
40 TABLET, FILM COATED ORAL 2 TIMES DAILY PRN
Qty: 180 TABLET | Refills: 3 | Status: SHIPPED | OUTPATIENT
Start: 2024-04-12 | End: 2024-08-22

## 2024-04-12 RX ORDER — OMEPRAZOLE 40 MG/1
40 CAPSULE, DELAYED RELEASE ORAL 2 TIMES DAILY
Qty: 180 CAPSULE | Refills: 3 | Status: SHIPPED | OUTPATIENT
Start: 2024-04-12

## 2024-04-12 SDOH — HEALTH STABILITY: PHYSICAL HEALTH: ON AVERAGE, HOW MANY DAYS PER WEEK DO YOU ENGAGE IN MODERATE TO STRENUOUS EXERCISE (LIKE A BRISK WALK)?: 2 DAYS

## 2024-04-12 ASSESSMENT — SOCIAL DETERMINANTS OF HEALTH (SDOH)
HOW OFTEN DO YOU ATTEND CHURCH OR RELIGIOUS SERVICES?: MORE THAN 4 TIMES PER YEAR
DO YOU BELONG TO ANY CLUBS OR ORGANIZATIONS SUCH AS CHURCH GROUPS UNIONS, FRATERNAL OR ATHLETIC GROUPS, OR SCHOOL GROUPS?: YES
IN A TYPICAL WEEK, HOW MANY TIMES DO YOU TALK ON THE PHONE WITH FAMILY, FRIENDS, OR NEIGHBORS?: MORE THAN THREE TIMES A WEEK
HOW OFTEN DO YOU ATTENT MEETINGS OF THE CLUB OR ORGANIZATION YOU BELONG TO?: 1 TO 4 TIMES PER YEAR
HOW OFTEN DO YOU GET TOGETHER WITH FRIENDS OR RELATIVES?: TWICE A WEEK

## 2024-04-12 ASSESSMENT — LIFESTYLE VARIABLES: HOW OFTEN DO YOU HAVE A DRINK CONTAINING ALCOHOL: MONTHLY OR LESS

## 2024-04-12 ASSESSMENT — PATIENT HEALTH QUESTIONNAIRE - PHQ9
SUM OF ALL RESPONSES TO PHQ QUESTIONS 1-9: 3
10. IF YOU CHECKED OFF ANY PROBLEMS, HOW DIFFICULT HAVE THESE PROBLEMS MADE IT FOR YOU TO DO YOUR WORK, TAKE CARE OF THINGS AT HOME, OR GET ALONG WITH OTHER PEOPLE: SOMEWHAT DIFFICULT
SUM OF ALL RESPONSES TO PHQ QUESTIONS 1-9: 3

## 2024-04-12 NOTE — PROGRESS NOTES
Preventive Care Visit  [facility]  Maryana Brooks MD, Family Medicine  Apr 12, 2024      Assessment & Plan     Encounter for Medicare annual wellness exam  - Lipid panel reflex to direct LDL Fasting; Future  - Comprehensive metabolic panel (BMP + Alb, Alk Phos, ALT, AST, Total. Bili, TP); Future    Cervical cancer screening  - Pap Screen with HPV - recommended age 30 - 65 years    Acquired hypothyroidism - surveillance labs ordered  - TSH; Future  - T4, free; Future  - T3, total; Future    Gastroesophageal reflux disease without esophagitis - stable, refills  - omeprazole (PRILOSEC) 40 MG DR capsule; Take 1 capsule (40 mg) by mouth 2 times daily Take 30-60 minutes before a meal.  - famotidine (PEPCID) 40 MG tablet; Take 1 tablet (40 mg) by mouth 2 times daily as needed for heartburn    Asymptomatic menopause  - DX Bone Density; Future    Post-pancreatectomy diabetes (H) - normal foot exam today, labs as below. Endo will check remainder - appt in May.   - Albumin Random Urine Quantitative with Creat Ratio; Future  - FOOT EXAM    S/P partial hysterectomy -      Counseling  Appropriate preventive services were discussed with this patient, including applicable screening as appropriate for fall prevention, nutrition, physical activity, Tobacco-use cessation, weight loss and cognition.  Checklist reviewing preventive services available has been given to the patient.  Reviewed patient's diet, addressing concerns and/or questions.   She is at risk for lack of exercise and has been provided with information to increase physical activity for the benefit of her well-being.   The patient was instructed to see the dentist every 6 months.   Information on urinary incontinence and treatment options given to patient.         Tyesha Bailey is a 59 year old, presenting for the following:  Wellness Visit        4/12/2024     9:07 AM   Additional Questions   Roomed by Moises James   Accompanied by  self         Via the Health Maintenance questionnaire, the patient has reported the following services have been completed -Eye Exam, this information has been sent to the abstraction team.  Health Care Directive  Patient does not have a Health Care Directive or Living Will: Discussed advance care planning with patient; information given to patient to review.    HPI      4/12/2024   General Health   Feel stress (tense, anxious, or unable to sleep) Rather much   (!) STRESS CONCERN      4/5/2024   Nutrition   Diet: Diabetic         4/12/2024   Exercise   Days per week of moderate/strenous exercise 2 days   (!) EXERCISE CONCERN      4/12/2024   Social Factors   Frequency of gathering with friends or relatives Twice a week   Worry food won't last until get money to buy more No   Food not last or not have enough money for food? No   Do you have housing?  Yes   Are you worried about losing your housing? No   Lack of transportation? No   Unable to get utilities (heat,electricity)? No         4/5/2024   Fall Risk   Fallen 2 or more times in the past year? No   Trouble with walking or balance? No          4/5/2024   Activities of Daily Living- Home Safety   Needs help with the following daily activites None of the above   Safety concerns in the home None of the above         4/5/2024   Dental   Dentist two times every year? (!) NO         4/5/2024   Hearing Screening   Hearing concerns? None of the above         4/5/2024   Driving Risk Screening   Patient/family members have concerns about driving No         4/5/2024   General Alertness/Fatigue Screening   Have you been more tired than usual lately? No         4/5/2024   Urinary Incontinence Screening   Bothered by leaking urine in past 6 months Yes         4/5/2024   TB Screening   Were you born outside of the [place]? No       Today's PHQ-9 Score:       4/12/2024     9:03 AM   PHQ-9 SCORE   PHQ-9 Total Score MyChart 3 (Minimal depression)   PHQ-9 Total Score 3         4/5/2024    Substance Use   Alcohol more than 3/day or more than 7/wk No   Do you have a current opioid prescription? No   How severe/bad is pain from 1 to 10? 2/10   Do you use any other substances recreationally? No     Social History     Tobacco Use    Smoking status: Never    Smokeless tobacco: Never   Vaping Use    Vaping status: Never Used   Substance Use Topics    Alcohol use: Not Currently    Drug use: Not Currently           2/25/2023   LAST FHS-7 RESULTS   1st degree relative breast or ovarian cancer No   Any relative bilateral breast cancer No   Any male have breast cancer No   Any ONE woman have BOTH breast AND ovarian cancer No   Any woman with breast cancer before 50yrs No   2 or more relatives with breast AND/OR ovarian cancer No   2 or more relatives with breast AND/OR bowel cancer No        Mammogram Screening - Mammogram every 1-2 years updated in Health Maintenance based on mutual decision making      History of abnormal Pap smear: NO - age 30-65 PAP every 5 years with negative HPV co-testing recommended        Latest Ref Rng & Units 3/26/2019     3:58 PM 3/26/2019     3:40 PM 9/14/2016     2:30 PM   PAP / HPV   PAP (Historical)  NIL      HPV 16 DNA NEG^Negative  Negative  Negative    HPV 18 DNA NEG^Negative  Negative  Negative    Other HR HPV NEG^Negative  Negative  Negative      ASCVD Risk   The 10-year ASCVD risk score (Saleem DK, et al., 2019) is: 4.5%    Values used to calculate the score:      Age: 59 years      Sex: Female      Is Non- : No      Diabetic: Yes      Tobacco smoker: No      Systolic Blood Pressure: 125 mmHg      Is BP treated: No      HDL Cholesterol: 88 mg/dL      Total Cholesterol: 174 mg/dL          Reviewed and updated as needed this visit by Provider      Problems               Patient Active Problem List   Diagnosis    Iron deficiency anemia secondary to inadequate dietary iron intake    Gastric bypass status for obesity    Health Care Home     Chronic pain syndrome    Exocrine pancreatic insufficiency    Asplenia    BLU (obstructive sleep apnea)    H/O of rectopexy    Dysthymia    Anxiety    Thyroid nodule    Acquired hypothyroidism    Post-pancreatectomy diabetes (H)    Other iron deficiency anemia    Urge incontinence    Urinary urgency    High-tone pelvic floor dysfunction    Pyuria    Recurrent kidney stones    Vaginal atrophy    Enteric hyperoxaluria    Hypocitraturia    Acute sepsis (H)    S/P partial hysterectomy     Past Surgical History:   Procedure Laterality Date    ABDOMINOPLASTY  2002    Tummy tuck    APPENDECTOMY  1990    BUNIONECTOMY Right 1998    CBD Stent placement  2002    CBD stent; Dr. Presley     SECTION      CHOLECYSTECTOMY      COLONOSCOPY N/A 2021    Procedure: COLONOSCOPY INCOMPLETE Aborted due to incomplete prep  will need to take additional prep and return tomorrow 21;  Surgeon: Ihsan Saenz MD;  Location: [facility]    COMBINED CYSTOSCOPY, RETROGRADES, URETEROSCOPY, LASER HOLMIUM LITHOTRIPSY URETER(S), INSERT STENT Right 2015    Procedure: COMBINED CYSTOSCOPY, RETROGRADES, URETEROSCOPY, LASER HOLMIUM LITHOTRIPSY URETER(S), INSERT STENT;  Surgeon: Kennedi Aldana MD;  Location: [facility]    COMBINED CYSTOSCOPY, RETROGRADES, URETEROSCOPY, LASER HOLMIUM LITHOTRIPSY URETER(S), INSERT STENT Right 2015    Procedure: COMBINED CYSTOSCOPY, RETROGRADES, URETEROSCOPY, LASER HOLMIUM LITHOTRIPSY URETER(S), INSERT STENT;  Surgeon: Kennedi Aldana MD;  Location: [facility]    COSMETIC SURGERY  2002    Tummy tuck    CYSTECTOMY OVARIAN BENIGN Right     CYSTOSCOPY, RETROGRADES, INSERT STENT URETER(S), COMBINED  10/02/2012    Procedure: COMBINED CYSTOSCOPY, RETROGRADES, INSERT STENT URETER(S);  COMBINED CYSTOSCOPY,  , INSERT LEFT STENT URETER;  Surgeon: Johny Baez MD;  Location: [facility]    CYSTOSCOPY, RETROGRADES, INSERT STENT URETER(S), COMBINED Left 2022    Procedure:  Cystoscopy with left retrograde pyelogram, left ureteroscopy, thulium laser lithotripsy of left ureteral calculus, stone basketing and left ureteral stent insertion;  Surgeon: Alonzo Rome MD;  Location: [facility]    ESOPHAGOSCOPY, GASTROSCOPY, DUODENOSCOPY (EGD), COMBINED N/A 01/24/2018    Procedure: COMBINED ESOPHAGOSCOPY, GASTROSCOPY, DUODENOSCOPY (EGD);  ESOPHAGOSCOPY, GASTROSCOPY, DUODENOSCOPY (EGD)    ;  Surgeon: Tamir Rodgers MD;  Location: [facility]    EXTRACORPOREAL SHOCK WAVE LITHOTRIPSY (ESWL)  10/16/2012    Procedure: EXTRACORPOREAL SHOCK WAVE LITHOTRIPSY (ESWL);  left EXTRACORPOREAL SHOCK WAVE LITHOTRIPSY (ESWL) ;  Surgeon: Johny Baez MD;  Location: [facility]    Gastric bypass NOS      HERNIA REPAIR  02/2015    HYSTERECTOMY SUPRACERVICAL, BILATERAL SALPINGO-OOPHORECTOMY, COMBINED N/A 02/01/2022    Procedure: Abdominal supracervical hysterectomy, bilateral salpingooophrectomy;  Surgeon: Nicole Rivera MD;  [facility]    IRRIGATION AND DEBRIDEMENT HAND, COMBINED Left 10/30/2020    Procedure: Left hand sharp excisional debridement of skin, subcutaneous tissue and fat with a scalpel, 2 x 1 x 1 cm.;  Surgeon: Demian Renteria MD;  Location: [facility]    LAPAROSCOPIC LYSIS ADHESIONS N/A 02/20/2015    Procedure: LAPAROSCOPIC LYSIS ADHESIONS;  Surgeon: Aaron Early MD;  [facility]    LAPAROSCOPIC LYSIS ADHESIONS N/A 12/29/2015    Procedure: LAPAROSCOPIC LYSIS ADHESIONS;  Surgeon: Aaron Early MD;  [facility]    PANCREATECTOMY, TRANSPLANT AUTO ISLET CELL, COMBINED  05/10/2013    Procedure: COMBINED PANCREATECTOMY, TRANSPLANT AUTO ISLET CELL;  Pancreatectomy, Auto Islet Cell Transplant   hernia repair, jejunostomy tube and liver biopsies with Anesthesia General with block;  Surgeon: Aaron Early MD;  Location[facility]    Partial ileum resection  1992    RECTOPEXY ABDOMINAL N/A 02/01/2022    Procedure: RECTOPEXY, ABDOMINAL;  Surgeon: Uriah Sheridan MD;  [facility]     REPAIR PTOSIS BROW BILATERAL Bilateral 2020    Procedure: BILATERAL BROW PTOSIS REPAIR;  Surgeon: Denise Alberts MD;  Location: [facility]    SACROCOLPOPEXY, CYSTOSCOPY, COMBINED N/A 2022    Procedure: Uterosacral ligament suspension, pina colposuspension with Cystoscopy;  Surgeon: Nicole Rivera MD;  [facility]    SIGMOIDOSCOPY FLEXIBLE N/A 2022    Procedure: SIGMOIDOSCOPY, FLEXIBLE;  Surgeon: Uriah Sheridan MD;  [facility]    Surgery for SBO      TONSILLECTOMY, ADENOIDECTOMY, COMBINED  1997    TRANSPLANT  5/10/13    Pancreatic Auto-Islet Transplant       Social History     Tobacco Use    Smoking status: Never    Smokeless tobacco: Never   Substance Use Topics    Alcohol use: Not Currently     Family History   Problem Relation Age of Onset    Family History Negative Mother     Respiratory Father         COPD;  at 69    Genitourinary Problems Father         kidney stones    Substance Abuse Father     Depression Father     Asthma Father     Heart Disease Paternal Grandfather         M.I.    Coronary Artery Disease Paternal Grandfather     Hyperlipidemia Paternal Grandfather     Genitourinary Problems Brother         multiple brothers with kidney stones    Gastrointestinal Disease Maternal Grandmother         undiagnosed 'gut' issues    Coronary Artery Disease Maternal Grandfather     Hyperlipidemia Maternal Grandfather     Cerebrovascular Disease Paternal Grandmother         At the age of 103    Anxiety Disorder Paternal Grandmother     Osteoporosis Paternal Grandmother     Anxiety Disorder Son     Anxiety Disorder Daughter     Asthma Daughter     Colon Cancer No family hx of          Current providers sharing in care for this patient include:  Patient Care Team:  Maryana Brooks MD as PCP - General (Family Practice)  Ana Davis MD as MD (Pulmonary Disease)  Bartolome Disla MD as MD (Neurology)  Rina Watt Amanda L, NP as  Nurse Practitioner (Nurse Practitioner Psych/Mental Health)  Adriana Robles MD as Assigned Pediatric Specialist Provider  Maryana Brooks MD as Assigned PCP  Sabrina Rutledge APRN CNP as Nurse Practitioner (Colon & Rectal)  Nicole Rivera MD as MD (Urology)  Nicole Rivera MD as MD (Urology)  Charmaine Navarro, RN as Specialty Care Coordinator (Urology)  Charmaine Navarro, RN as Specialty Care Coordinator (Urology)  Maryana Brooks MD as Referring Physician (Family Medicine)  Hannah Shook RN as Diabetes Educator  Rogerio Heller MD as Assigned Neuroscience Provider  Nikunj Plummer MD as Assigned Musculoskeletal Provider  Hannah Murcia MD as MD (Nephrology)  Alonzo Rome MD as MD (Urology)  Hannah Murcia MD as Assigned Nephrology Provider  Zenia Loco MD as MD (Infectious Diseases)  Amara Finley MD as Physician (Infectious Diseases)  Amara Finley MD as Assigned Infectious Disease Provider  Nico Montero MD as Assigned Cancer Care Provider  Alonzo Rome MD as Assigned Surgical Provider    The following health maintenance items are reviewed in Epic and correct as of today:  Health Maintenance   Topic Date Due    ZOSTER IMMUNIZATION (1 of 2) Never done    RSV VACCINE (Pregnancy & 60+) (1 - 1-dose 60+ series) Never done    EYE EXAM  06/01/2023    MICROALBUMIN  11/01/2023    TSH W/FREE T4 REFLEX  03/24/2024    HPV TEST  03/26/2024    PAP  03/26/2024    A1C  09/03/2024    LIPID  09/07/2024    PHQ-9  10/12/2024    MAMMO SCREENING  02/25/2025    BMP  03/06/2025    MEDICARE ANNUAL WELLNESS VISIT  04/12/2025    DIABETIC FOOT EXAM  04/12/2025    ANNUAL REVIEW OF HM ORDERS  04/12/2025    ADVANCE CARE PLANNING  12/13/2027    DTAP/TDAP/TD IMMUNIZATION (3 - Td or Tdap) 08/17/2030    COLORECTAL CANCER SCREENING  04/01/2031    HEPATITIS C SCREENING  Completed    HIV SCREENING  Completed    DEPRESSION ACTION PLAN  Completed    INFLUENZA  "VACCINE  Completed    Pneumococcal Vaccine: Pediatrics (0 to 5 Years) and At-Risk Patients (6 to 64 Years)  Completed    COVID-19 Vaccine  Completed    IPV IMMUNIZATION  Aged Out    HPV IMMUNIZATION  Aged Out    RSV MONOCLONAL ANTIBODY  Aged Out    URINE DRUG SCREEN  Discontinued    MENINGITIS IMMUNIZATION  Discontinued         Review of Systems  Constitutional, neuro, ENT, endocrine, pulmonary, cardiac, gastrointestinal, genitourinary, musculoskeletal, integument and psychiatric systems are negative, except as otherwise noted.     Objective    Exam  /81 (BP Location: Right arm, Patient Position: Chair, Cuff Size: Adult Regular)   Pulse 94   Temp 99.6  F (37.6  C) (Oral)   Resp 14   Ht 1.619 m (5' 3.75\")   Wt 56.7 kg (125 lb)   LMP 12/19/2013   SpO2 96%   BMI 21.62 kg/m     Estimated body mass index is 21.62 kg/m  as calculated from the following:    Height as of this encounter: 1.619 m (5' 3.75\").    Weight as of this encounter: 56.7 kg (125 lb).    Physical Exam  GENERAL: alert and no distress  EYES: Eyes grossly normal to inspection, PERRL and conjunctivae and sclerae normal  HENT: ear canals and TM's normal, nose and mouth without ulcers or lesions  NECK: no adenopathy, no asymmetry, masses, or scars  RESP: lungs clear to auscultation - no rales, rhonchi or wheezes  BREAST: normal without masses, tenderness or nipple discharge and no palpable axillary masses or adenopathy  CV: regular rate and rhythm, normal S1 S2, no S3 or S4, no murmur, click or rub, no peripheral edema  ABDOMEN: soft, nontender, no hepatosplenomegaly, no masses and bowel sounds normal   (female): normal female external genitalia, normal urethral meatus, normal vaginal mucosa  MS: no gross musculoskeletal defects noted, no edema  SKIN: no suspicious lesions or rashes  NEURO: Normal strength and tone, mentation intact and speech normal  PSYCH: mentation appears normal, affect normal/bright         4/12/2024   Mini Cog   Clock " Draw Score 2 Normal   3 Item Recall 3 objects recalled   Mini Cog Total Score 5              Signed Electronically by: Maryana Brooks MD    Answers submitted by the patient for this visit:  Patient Health Questionnaire (Submitted on 4/12/2024)  If you checked off any problems, how difficult have these problems made it for you to do your work, take care of things at home, or get along with other people?: Somewhat difficult  PHQ9 TOTAL SCORE: 3

## 2024-04-12 NOTE — PATIENT INSTRUCTIONS
Preventive Care Advice   This is general advice given by our system to help you stay healthy. However, your care team may have specific advice just for you. Please talk to your care team about your preventive care needs.  Nutrition  Eat 5 or more servings of fruits and vegetables each day.  Try wheat bread, brown rice and whole grain pasta (instead of white bread, rice, and pasta).  Get enough calcium and vitamin D. Check the label on foods and aim for 100% of the RDA (recommended daily allowance).  Lifestyle  Exercise at least 150 minutes each week   (30 minutes a day, 5 days a week).  Do muscle strengthening activities 2 days a week. These help control your weight and prevent disease.  No smoking.  Wear sunscreen to prevent skin cancer.  Have a dental exam and cleaning every 6 months.  Yearly exams  See your health care team every year to talk about:  Any changes in your health.  Any medicines your care team has prescribed.  Preventive care, family planning, and ways to prevent chronic diseases.  Shots (vaccines)   HPV shots (up to age 26), if you've never had them before.  Hepatitis B shots (up to age 59), if you've never had them before.  COVID-19 shot: Get this shot when it's due.  Flu shot: Get a flu shot every year.  Tetanus shot: Get a tetanus shot every 10 years.  Pneumococcal, hepatitis A, and RSV shots: Ask your care team if you need these based on your risk.  Shingles shot (for age 50 and up).  General health tests  Diabetes screening:  Starting at age 35, Get screened for diabetes at least every 3 years.  If you are younger than age 35, ask your care team if you should be screened for diabetes.  Cholesterol test: At age 39, start having a cholesterol test every 5 years, or more often if advised.  Bone density scan (DEXA): At age 50, ask your care team if you should have this scan for osteoporosis (brittle bones).  Hepatitis C: Get tested at least once in your life.  STIs (sexually transmitted  infections)  Before age 24: Ask your care team if you should be screened for STIs.  After age 24: Get screened for STIs if you're at risk. You are at risk for STIs (including HIV) if:  You are sexually active with more than one person.  You don't use condoms every time.  You or a partner was diagnosed with a sexually transmitted infection.  If you are at risk for HIV, ask about PrEP medicine to prevent HIV.  Get tested for HIV at least once in your life, whether you are at risk for HIV or not.  Cancer screening tests  Cervical cancer screening: If you have a cervix, begin getting regular cervical cancer screening tests at age 21. Most people who have regular screenings with normal results can stop after age 65. Talk about this with your provider.  Breast cancer scan (mammogram): If you've ever had breasts, begin having regular mammograms starting at age 40. This is a scan to check for breast cancer.  Colon cancer screening: It is important to start screening for colon cancer at age 45.  Have a colonoscopy test every 10 years (or more often if you're at risk) Or, ask your provider about stool tests like a FIT test every year or Cologuard test every 3 years.  To learn more about your testing options, visit: https://[email or web address].  For help making a decision, visit: https://bit.ly/nn28968.  Prostate cancer screening test: If you have a prostate and are age 55 to 69, ask your provider if you would benefit from a yearly prostate cancer screening test.  Lung cancer screening: If you are a current or former smoker age 50 to 80, ask your care team if ongoing lung cancer screenings are right for you.  For informational purposes only. Not to replace the advice of your health care provider. Copyright   2023 [employer]. All rights reserved. Clinically reviewed by the [facility]. [id number] - REV 01/24.    Learning About Stress  What is stress?     Stress is your  body's response to a hard situation. Your body can have a physical, emotional, or mental response. Stress is a fact of life for most people, and it affects everyone differently. What causes stress for you may not be stressful for someone else.  A lot of things can cause stress. You may feel stress when you go on a job interview, take a test, or run a race. This kind of short-term stress is normal and even useful. It can help you if you need to work hard or react quickly. For example, stress can help you finish an important job on time.  Long-term stress is caused by ongoing stressful situations or events. Examples of long-term stress include long-term health problems, ongoing problems at work, or conflicts in your family. Long-term stress can harm your health.  How does stress affect your health?  When you are stressed, your body responds as though you are in danger. It makes hormones that speed up your heart, make you breathe faster, and give you a burst of energy. This is called the fight-or-flight stress response. If the stress is over quickly, your body goes back to normal and no harm is done.  But if stress happens too often or lasts too long, it can have bad effects. Long-term stress can make you more likely to get sick, and it can make symptoms of some diseases worse. If you tense up when you are stressed, you may develop neck, shoulder, or low back pain. Stress is linked to high blood pressure and heart disease.  Stress also harms your emotional health. It can make you barragan, tense, or depressed. Your relationships may suffer, and you may not do well at work or school.  What can you do to manage stress?  You can try these things to help manage stress:   Do something active. Exercise or activity can help reduce stress. Walking is a great way to get started. Even everyday activities such as housecleaning or yard work can help.  Try yoga or krystal chi. These techniques combine exercise and meditation. You may need  some training at first to learn them.  Do something you enjoy. For example, listen to music or go to a movie. Practice your hobby or do volunteer work.  Meditate. This can help you relax, because you are not worrying about what happened before or what may happen in the future.  Do guided imagery. Imagine yourself in any setting that helps you feel calm. You can use online videos, books, or a teacher to guide you.  Do breathing exercises. For example:  From a standing position, bend forward from the waist with your knees slightly bent. Let your arms dangle close to the floor.  Breathe in slowly and deeply as you return to a standing position. Roll up slowly and lift your head last.  Hold your breath for just a few seconds in the standing position.  Breathe out slowly and bend forward from the waist.  Let your feelings out. Talk, laugh, cry, and express anger when you need to. Talking with supportive friends or family, a counselor, or a rhonda leader about your feelings is a healthy way to relieve stress. Avoid discussing your feelings with people who make you feel worse.  Write. It may help to write about things that are bothering you. This helps you find out how much stress you feel and what is causing it. When you know this, you can find better ways to cope.  What can you do to prevent stress?  You might try some of these things to help prevent stress:  Manage your time. This helps you find time to do the things you want and need to do.  Get enough sleep. Your body recovers from the stresses of the day while you are sleeping.  Get support. Your family, friends, and community can make a difference in how you experience stress.  Limit your news feed. Avoid or limit time on social media or news that may make you feel stressed.  Do something active. Exercise or activity can help reduce stress. Walking is a great way to get started.  Where can you learn more?  Go to https://www.healthwise.net/patiented  Enter N032 in the  "search box to learn more about \"Learning About Stress.\"  Current as of: October 24, 2023               Content Version: 14.0    3626-7525 [employer].   Care instructions adapted under license by your healthcare professional. If you have questions about a medical condition or this instruction, always ask your healthcare professional. [employer] disclaims any warranty or liability for your use of this information.      Bladder Training: Care Instructions  Your Care Instructions     Bladder training is used to treat urge incontinence and stress incontinence. Urge incontinence means that the need to urinate comes on so fast that you can't get to a toilet in time. Stress incontinence means that you leak urine because of pressure on your bladder. For example, it may happen when you laugh, cough, or lift something heavy.  Bladder training can increase how long you can wait before you have to urinate. It can also help your bladder hold more urine. And it can give you better control over the urge to urinate.  It is important to remember that bladder training takes a few weeks to a few months to make a difference. You may not see results right away, but don't give up.  Follow-up care is a key part of your treatment and safety. Be sure to make and go to all appointments, and call your doctor if you are having problems. It's also a good idea to know your test results and keep a list of the medicines you take.  How can you care for yourself at home?  Work with your doctor to come up with a bladder training program that is right for you. You may use one or more of the following methods.  Delayed urination  In the beginning, try to keep from urinating for 5 minutes after you first feel the need to go.  While you wait, take deep, slow breaths to relax. Kegel exercises can also help you delay the need to go to the bathroom.  After some practice, when you can easily wait 5 minutes to urinate, try to wait " "10 minutes before you urinate.  Slowly increase the waiting period until you are able to control when you have to urinate.  Scheduled urination  Empty your bladder when you first wake up in the morning.  Schedule times throughout the day when you will urinate.  Start by going to the bathroom every hour, even if you don't need to go.  Slowly increase the time between trips to the bathroom.  When you have found a schedule that works well for you, keep doing it.  If you wake up during the night and have to urinate, do it. Apply your schedule to waking hours only.  Kegel exercises  These tighten and strengthen pelvic muscles, which can help you control the flow of urine. (If doing these exercises causes pain, stop doing them and talk with your doctor.) To do Kegel exercises:  Squeeze your muscles as if you were trying not to pass gas. Or squeeze your muscles as if you were stopping the flow of urine. Your belly, legs, and buttocks shouldn't move.  Hold the squeeze for 3 seconds, then relax for 5 to 10 seconds.  Start with 3 seconds, then add 1 second each week until you are able to squeeze for 10 seconds.  Repeat the exercise 10 times a session. Do 3 to 8 sessions a day.  When should you call for help?  Watch closely for changes in your health, and be sure to contact your doctor if:    Your incontinence is getting worse.     You do not get better as expected.   Where can you learn more?  Go to https://[email or web address].net/patiented  Enter V684 in the search box to learn more about \"Bladder Training: Care Instructions.\"  Current as of: November 15, 2023               Content Version: 14.0    7430-4959 [employer].   Care instructions adapted under license by your healthcare professional. If you have questions about a medical condition or this instruction, always ask your healthcare professional. [employer] disclaims any warranty or liability for your use of this information.      "

## 2024-04-16 LAB
BKR LAB AP GYN ADEQUACY: NORMAL
BKR LAB AP GYN INTERPRETATION: NORMAL
BKR LAB AP HPV REFLEX: NORMAL
BKR LAB AP PREVIOUS ABNORMAL: NORMAL
PATH REPORT.COMMENTS IMP SPEC: NORMAL
PATH REPORT.COMMENTS IMP SPEC: NORMAL
PATH REPORT.RELEVANT HX SPEC: NORMAL

## 2024-04-17 LAB
HUMAN PAPILLOMA VIRUS 16 DNA: NEGATIVE
HUMAN PAPILLOMA VIRUS 18 DNA: NEGATIVE
HUMAN PAPILLOMA VIRUS FINAL DIAGNOSIS: NORMAL
HUMAN PAPILLOMA VIRUS OTHER HR: NEGATIVE

## 2024-04-24 ENCOUNTER — LAB (OUTPATIENT)
Dept: LAB | Facility: CLINIC | Age: 59
End: 2024-04-24
Payer: COMMERCIAL

## 2024-04-24 DIAGNOSIS — Z90.410 POST-PANCREATECTOMY DIABETES (H): ICD-10-CM

## 2024-04-24 DIAGNOSIS — Z00.00 ENCOUNTER FOR MEDICARE ANNUAL WELLNESS EXAM: ICD-10-CM

## 2024-04-24 DIAGNOSIS — E89.1 POST-PANCREATECTOMY DIABETES (H): ICD-10-CM

## 2024-04-24 DIAGNOSIS — E13.9 POST-PANCREATECTOMY DIABETES (H): ICD-10-CM

## 2024-04-24 DIAGNOSIS — E03.9 ACQUIRED HYPOTHYROIDISM: ICD-10-CM

## 2024-04-24 LAB
ALBUMIN SERPL BCG-MCNC: 4.1 G/DL (ref 3.5–5.2)
ALP SERPL-CCNC: 96 U/L (ref 40–150)
ALT SERPL W P-5'-P-CCNC: 27 U/L (ref 0–50)
ANION GAP SERPL CALCULATED.3IONS-SCNC: 9 MMOL/L (ref 7–15)
AST SERPL W P-5'-P-CCNC: 27 U/L (ref 0–45)
BILIRUB SERPL-MCNC: 0.5 MG/DL
BUN SERPL-MCNC: 19.8 MG/DL (ref 8–23)
CALCIUM SERPL-MCNC: 9.2 MG/DL (ref 8.8–10.2)
CHLORIDE SERPL-SCNC: 106 MMOL/L (ref 98–107)
CHOLEST SERPL-MCNC: 157 MG/DL
CREAT SERPL-MCNC: 0.95 MG/DL (ref 0.51–0.95)
CREAT UR-MCNC: 164 MG/DL
DEPRECATED HCO3 PLAS-SCNC: 25 MMOL/L (ref 22–29)
EGFRCR SERPLBLD CKD-EPI 2021: 68 ML/MIN/1.73M2
FASTING STATUS PATIENT QL REPORTED: YES
GLUCOSE SERPL-MCNC: 159 MG/DL (ref 70–99)
HDLC SERPL-MCNC: 63 MG/DL
LDLC SERPL CALC-MCNC: 71 MG/DL
MICROALBUMIN UR-MCNC: 19 MG/L
MICROALBUMIN/CREAT UR: 11.59 MG/G CR (ref 0–25)
NONHDLC SERPL-MCNC: 94 MG/DL
POTASSIUM SERPL-SCNC: 4.1 MMOL/L (ref 3.4–5.3)
PROT SERPL-MCNC: 6.7 G/DL (ref 6.4–8.3)
SODIUM SERPL-SCNC: 140 MMOL/L (ref 135–145)
T3 SERPL-MCNC: 90 NG/DL (ref 85–202)
T4 FREE SERPL-MCNC: 1.81 NG/DL (ref 0.9–1.7)
TRIGL SERPL-MCNC: 116 MG/DL
TSH SERPL DL<=0.005 MIU/L-ACNC: 0.01 UIU/ML (ref 0.3–4.2)

## 2024-04-24 PROCEDURE — 82570 ASSAY OF URINE CREATININE: CPT

## 2024-04-24 PROCEDURE — 80053 COMPREHEN METABOLIC PANEL: CPT

## 2024-04-24 PROCEDURE — 84439 ASSAY OF FREE THYROXINE: CPT

## 2024-04-24 PROCEDURE — 80061 LIPID PANEL: CPT

## 2024-04-24 PROCEDURE — 82043 UR ALBUMIN QUANTITATIVE: CPT

## 2024-04-24 PROCEDURE — 84480 ASSAY TRIIODOTHYRONINE (T3): CPT

## 2024-04-24 PROCEDURE — 84443 ASSAY THYROID STIM HORMONE: CPT

## 2024-04-24 PROCEDURE — 36415 COLL VENOUS BLD VENIPUNCTURE: CPT

## 2024-04-25 DIAGNOSIS — E03.9 ACQUIRED HYPOTHYROIDISM: ICD-10-CM

## 2024-04-25 RX ORDER — LEVOTHYROXINE SODIUM 125 UG/1
125 TABLET ORAL DAILY
Qty: 90 TABLET | Refills: 3 | Status: SHIPPED | OUTPATIENT
Start: 2024-04-25

## 2024-05-02 ENCOUNTER — OFFICE VISIT (OUTPATIENT)
Dept: TRANSPLANT | Facility: CLINIC | Age: 59
End: 2024-05-02
Attending: PEDIATRICS
Payer: COMMERCIAL

## 2024-05-02 VITALS
HEART RATE: 71 BPM | WEIGHT: 128 LBS | OXYGEN SATURATION: 96 % | BODY MASS INDEX: 22.14 KG/M2 | SYSTOLIC BLOOD PRESSURE: 142 MMHG | TEMPERATURE: 98.2 F | DIASTOLIC BLOOD PRESSURE: 81 MMHG

## 2024-05-02 DIAGNOSIS — K86.89 PANCREATIC INSUFFICIENCY: ICD-10-CM

## 2024-05-02 PROCEDURE — 99215 OFFICE O/P EST HI 40 MIN: CPT | Performed by: PEDIATRICS

## 2024-05-02 PROCEDURE — 99213 OFFICE O/P EST LOW 20 MIN: CPT | Performed by: PEDIATRICS

## 2024-05-02 NOTE — PATIENT INSTRUCTIONS
1)  Changed I:C ratio from 12g to 10g so you will get a bigger bolus with food.    Follow Up Sept 19 at 3pm

## 2024-05-02 NOTE — LETTER
5/2/2024         RE: Lynn Thompson  [id number] [facility] [address]        Dear Colleague,    Thank you for referring your patient, Lynn Thompson, to the [facility]. Please see a copy of my visit note below.      [facility]  Islet Autotransplant, Diabetes Follow Up    Problem List:  Patient Active Problem List   Diagnosis     Iron deficiency anemia secondary to inadequate dietary iron intake     Gastric bypass status for obesity     Health Care Home     Chronic pain syndrome     Exocrine pancreatic insufficiency     Asplenia     BLU (obstructive sleep apnea)     H/O of rectopexy     Dysthymia     Anxiety     Thyroid nodule     Acquired hypothyroidism     Post-pancreatectomy diabetes (H)     Other iron deficiency anemia     Urge incontinence     Urinary urgency     High-tone pelvic floor dysfunction     Pyuria     Recurrent kidney stones     Vaginal atrophy     Enteric hyperoxaluria     Hypocitraturia     Acute sepsis (H)     S/P partial hysterectomy         Assessment:  1.  Post pancreatectomy diabetes mellitus, s/p total pancreatectomy and islet autotransplant.  (ICD-10 E89.1)  2.  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  Rapid gastric emptying-- must use Viokace as she does not respond to enteric coated enzymes  4.  Concern for recurring cholangitis (keep on list for FMT options when available)    Lynn is a 59 year old with history of gastric bypass, chronic pancreatitis who is s/p total pancreatectomy and islet autotransplant.  She was insulin independent until 6/6/2017 (just after 4 years post-tx) and now has partial islet function.    She is on Fiasp due to rapid gastric emptying and post meal hypoglycemia.  She is on Viokace because she only responds to non enteric coated enzymes and otherwise has severe malabsorption.  She has had another episode of elevated  LFTs and abdominal pain recently that is somewhat concerning for cholangitis (but doesn't quite fit in absence of elevated WBC and resolution without antibiotics)    She remains on the Omnipod 5.  Main issue persistent is post-prandial hyperglycemia so we again adjusted her I:C ratio as below.      Plan:  1.  Changes to current diabetes regimen:  Patient Instructions   1)  Changed I:C ratio from 12g to 10g so you will get a bigger bolus with food.    Follow Up Sept 19 at 3pm    2.  Frequency of blood sugar checks:  premeal and bedtime, and as needed for hypoglycemia (6 strip per day).    3.  Continue routine follow up for autoislet transplant patients:  Mixed meal test (6 mL/kg BoostHP to max of 360 mL) at 3 months, 6 months, and once a year post transplant.  Hemoglobin A1c levels at these time points and quarterly.    4.  Other issues addressed today:  As above    Follow up:  As above        Contact me for questions at [phone] or [phone].  Emergency number to reach pediatric endocrinology after hours is [phone].    Dr. Adriana Robles MD  Professor, Pediatric Endocrinology  [facility]  Phone:  [phone]  Electronically signed: May 3, 2024 at 11:12 AM        Review of the result(s) of each unique test - CGM, pump  Prescription drug management  40 minutes spent by me on the date of the encounter doing chart review, history and exam, documentation and further activities per the note           HPI:  Lynn is a 59 year old female here for follow up o total pancreatectomy, islet cell autotransplant, splenectomy, liver biopsy (needle core), choledochoduodenostomy, feeding jejunostomy performed on 5/10/2013.  At the time of the procedure, the patient received 178,100 IEQ, or 3,209 IEQ/kg body weight. She has had two subsequent exploratory laparatomy for small bowel obstruction. Other notable endocrine history includes a complex cystic nodule in mid right  thyroid lobe with 1 cm maximum diameter (saw Dr Adorno in 11/2016) and mild hypothyroidism followed by PCP, pathology in 2018 by FNA showed a benign nodule (includes adenomatoid nodule, colloid nodule, etc.).  She had an episode of cholangitis and was hospitalized for 4 days 8/24/17 (fevers, RUQ pain, + Ecoli blood cx).    She was on NO insulin at her 1 year, 2 year, 3 year, 4 year transplant anniversaries and restart insulin just after 4 years post-tx, insulin restart date of  6/6/2017.   She had a slow opioid wean off suboxone but eventually did come off.     Other history notable for surgical procedure Admitted from 2/1- 2/5/22 for  1. Posterior suture rectopexy (Colorectal primary)  2. Sigmoidoscopy (Colorectal primary)  3. Supracervical hysterectomy with bilateral salpingooophrectomy (Uro)  4. Uterosacral ligament suspension (Uro)  5. Gan colposuspension (Uro)  6. Cystoscopy (Uro)        Interval history:  -- hospitalized in March with elevated LFTs and abdominal pain.  Had a positive norovirus in stool though unclear if that was the cause.  Story a bit suspicious for cholangitis but not really having an elevated WBC and didn't need abx.       1)  Diabetes:  Lynn continues to have partial islet graft function.   She is on Fiasp right before meal time and has rapid gastric emptying.  .     Continues on Omnipod 5 and is very happy with this.   Nighttime have been very stable.    Patterns reviewed today-- post prandial highs    No pump or CGM issues.    Pump, settings below.    Max Basal 1.0 unit(s)/hr     Basal Rates:  12a 0.15 unit(s)/hr  8a 0.25 units/hr      Target Glucose  12a-8a 130 Correct above 140  8a-12 a 120 Correct above 130     Sensitivity   12a -8a 170  8am-12a 140     Insulin to Carb Ratio  12 am 12 grams     Max Bolus 6  Active insulin time 3 hours  Reverse correction off        Total daily insulin dose= 15.7 units of Fiasp, of which 6.3 units per day is basal on pump, and reaminder is  bolus.               HbA1c levels:  Lab Results   Component Value Date    A1C 6.7 03/03/2024    A1C 7.1 09/07/2023    A1C 6.6 04/03/2023    A1C 6.5 01/19/2023    A1C 7.7 09/15/2022    A1C 6.8 05/10/2021    A1C 6.9 02/16/2021    A1C 6.7 08/20/2020    A1C 7.1 06/05/2020    A1C 6.9 02/06/2020           Hypoglycemia history:   Recent history as above.  The patient has had NO episodes of severe hypoglycemia (seizure, coma, or neuroglycopenic symptoms severe enough to require assistance from another person).  Blood sugars were reviewed from the patient records and/or the meter download.        Patient is good candidate for CGM therapy.  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   --  Require frequent adjustments of the insulin treatment regimen, based on therapeutic CGM test results      2)  Nutrition./PERT: Doing OK on Viokace.  When on ZenPep, she had malabsorption to the point of needing to wear a diaper, bloating, felt awful.  Now on Viokace again, stools are formed, bloating improved,     Last nutritional studies:  Component      Latest Ref Rng & Units 9/6/2022 9/15/2022   Iron      37 - 145 ug/dL 69    Iron Sat Index      15 - 46 % 27    Iron Binding Capacity      240 - 430 ug/dL 255    Vitamin A      0.30 - 1.20 mg/L  0.49   Retinol Palmitate      0.00 - 0.10 mg/L  <0.02   Vitamin A Interp        Normal   25 OH Vit D2      ug/L  <5   25 OH Vit D3      ug/L  58   25 OH Vit D total      20 - 75 ug/L  <63   Vitamin E      5.5 - 18.0 mg/L  10.3   Vitamin E Gamma      0.0 - 6.0 mg/L  0.9   Vitamin B12      232 - 1,245 pg/mL   418       3)  Thyroid:  On levothyroxine 150 mcg daily, increased around time of last visit.  Most recent labs nl.  TSH   Date Value Ref Range Status   2024 0.01 (L) 0.30 - 4.20 uIU/mL Final   2021 2.75 0.40 - 4.00 mU/L Final   03/15/2021 2.93 0.40 - 4.00 mU/L Final     T4 Free   Date Value Ref Range Status   2020 1.05 0.76 - 1.46 ng/dL Final     Free T4   Date Value Ref Range Status   2024 1.81 (H) 0.90 - 1.70 ng/dL Final       4)  Hospitalized as above for elevated LFTs    Review of systems:  A complete Review Of Systems was performed but was negative except as noted in the HPI.    Past Medical and Surgical History:  Past Medical History:   Diagnosis Date     Benign paroxysmal positional vertigo     occ.      Calculus of kidney 05/2005    x1 on L side passed, several stones.  Has been tested for oxalate.     Chronic abdominal pain 2013     Chronic pancreatitis 2013     Depression     also occ panic spells     Diabetes (H) 5/10/2013    Total Pancreatomy with Auto Islets Transplant     Dyspepsia 1999    H. pylori   treated     Dysthymia 2016     Headaches     still periodic HA's ;  often 5X/week     Hypertension 2016    Stress related     Iron deficiency anemia 2003    relates to gastric bypass     Post-pancreatectomy diabetes melltius 2013     Sleep apnea     uses splint     Spasm of sphincter of Oddi     surgical + endoscopic stenting of pancreatic duct @ [facility] 06     Thyroid nodule 2016     Past Surgical History:   Procedure Laterality Date     ABDOMINOPLASTY  2002    Tummy tuck     APPENDECTOMY  1990     BUNIONECTOMY Right 1998     CBD Stent placement  2002    CBD stent; Dr. Presley      SECTION       CHOLECYSTECTOMY       COLONOSCOPY N/A 2021    Procedure: COLONOSCOPY INCOMPLETE Aborted due to incomplete prep  will need to take additional prep and return tomorrow 21;  Surgeon: Ihsan Saenz MD;  Location:   GI     COMBINED CYSTOSCOPY, RETROGRADES, URETEROSCOPY, LASER HOLMIUM LITHOTRIPSY URETER(S), INSERT STENT Right 03/23/2015    Procedure: COMBINED CYSTOSCOPY, RETROGRADES, URETEROSCOPY, LASER HOLMIUM LITHOTRIPSY URETER(S), INSERT STENT;  Surgeon: Kennedi Aldana MD;  Location: [facility] OR     COMBINED CYSTOSCOPY, RETROGRADES, URETEROSCOPY, LASER HOLMIUM LITHOTRIPSY URETER(S), INSERT STENT Right 04/20/2015    Procedure: COMBINED CYSTOSCOPY, RETROGRADES, URETEROSCOPY, LASER HOLMIUM LITHOTRIPSY URETER(S), INSERT STENT;  Surgeon: Kennedi Aldana MD;  Location: [facility] OR     COSMETIC SURGERY  6/24/2002    Tummy tuck     CYSTECTOMY OVARIAN BENIGN Right      CYSTOSCOPY, RETROGRADES, INSERT STENT URETER(S), COMBINED  10/02/2012    Procedure: COMBINED CYSTOSCOPY, RETROGRADES, INSERT STENT URETER(S);  COMBINED CYSTOSCOPY,  , INSERT LEFT STENT URETER;  Surgeon: Johny Baez MD;  Location: [facility] OR     CYSTOSCOPY, RETROGRADES, INSERT STENT URETER(S), COMBINED Left 9/20/2022    Procedure: Cystoscopy with left retrograde pyelogram, left ureteroscopy, thulium laser lithotripsy of left ureteral calculus, stone basketing and left ureteral stent insertion;  Surgeon: Alonzo Rome MD;  Location: [facility] OR     ESOPHAGOSCOPY, GASTROSCOPY, DUODENOSCOPY (EGD), COMBINED N/A 01/24/2018    Procedure: COMBINED ESOPHAGOSCOPY, GASTROSCOPY, DUODENOSCOPY (EGD);  ESOPHAGOSCOPY, GASTROSCOPY, DUODENOSCOPY (EGD)    ;  Surgeon: Tamir Rodgers MD;  Location: [facility] GI     EXTRACORPOREAL SHOCK WAVE LITHOTRIPSY (ESWL)  10/16/2012    Procedure: EXTRACORPOREAL SHOCK WAVE LITHOTRIPSY (ESWL);  left EXTRACORPOREAL SHOCK WAVE LITHOTRIPSY (ESWL) ;  Surgeon: Johny Baez MD;  Location: [facility] OR     Gastric bypass NOS       HERNIA REPAIR  02/2015     HYSTERECTOMY SUPRACERVICAL, BILATERAL SALPINGO-OOPHORECTOMY, COMBINED N/A 02/01/2022    Procedure: Abdominal supracervical hysterectomy, bilateral salpingooophrectomy;  Surgeon: Nicole Rivera MD;   [facility]     IRRIGATION AND DEBRIDEMENT HAND, COMBINED Left 10/30/2020    Procedure: Left hand sharp excisional debridement of skin, subcutaneous tissue and fat with a scalpel, 2 x 1 x 1 cm.;  Surgeon: Demian Renteria MD;  Location: [facility] OR     LAPAROSCOPIC LYSIS ADHESIONS N/A 2015    Procedure: LAPAROSCOPIC LYSIS ADHESIONS;  Surgeon: Aaron Early MD;  [facility]     LAPAROSCOPIC LYSIS ADHESIONS N/A 2015    Procedure: LAPAROSCOPIC LYSIS ADHESIONS;  Surgeon: Aaron Early MD;  [facility]     PANCREATECTOMY, TRANSPLANT AUTO ISLET CELL, COMBINED  05/10/2013    Procedure: COMBINED PANCREATECTOMY, TRANSPLANT AUTO ISLET CELL;  Pancreatectomy, Auto Islet Cell Transplant   hernia repair, jejunostomy tube and liver biopsies with Anesthesia General with block;  Surgeon: Aaron Early MD;  Location[facility]     Partial ileum resection       RECTOPEXY ABDOMINAL N/A 2022    Procedure: RECTOPEXY, ABDOMINAL;  Surgeon: Uriah Sheridan MD;  [facility]     REPAIR PTOSIS BROW BILATERAL Bilateral 2020    Procedure: BILATERAL BROW PTOSIS REPAIR;  Surgeon: Denise Alberts MD;  Location: [facility] OR     SACROCOLPOPEXY, CYSTOSCOPY, COMBINED N/A 2022    Procedure: Uterosacral ligament suspension, pina colposuspension with Cystoscopy;  Surgeon: Nicole Rivera MD;  [facility]     SIGMOIDOSCOPY FLEXIBLE N/A 2022    Procedure: SIGMOIDOSCOPY, FLEXIBLE;  Surgeon: Uriah Sheridan MD;  [facility]     Surgery for SBO       TONSILLECTOMY, ADENOIDECTOMY, COMBINED  1997     TRANSPLANT  5/10/13    Pancreatic Auto-Islet Transplant       Family History:  New changes since last visit:  none  Family History   Problem Relation Age of Onset     Family History Negative Mother      Respiratory Father         COPD;  at 69     Genitourinary Problems Father         kidney stones     Substance Abuse Father      Depression Father      Asthma Father       Heart Disease Paternal Grandfather         M.I.     Coronary Artery Disease Paternal Grandfather      Hyperlipidemia Paternal Grandfather      Genitourinary Problems Brother         multiple brothers with kidney stones     Gastrointestinal Disease Maternal Grandmother         undiagnosed 'gut' issues     Coronary Artery Disease Maternal Grandfather      Hyperlipidemia Maternal Grandfather      Cerebrovascular Disease Paternal Grandmother         At the age of 103     Anxiety Disorder Paternal Grandmother      Osteoporosis Paternal Grandmother      Anxiety Disorder Son      Anxiety Disorder Daughter      Asthma Daughter      Colon Cancer No family hx of        Social History:  Social History     Social History Narrative     Not on file     Does not work currently.  Mom to many foster dogs     Physical Exam:  Vitals: BP (!) 142/81 (BP Location: Right arm, Patient Position: Chair, Cuff Size: Adult Regular)   Pulse 71   Temp 98.2  F (36.8  C) (Oral)   Wt 58.1 kg (128 lb)   LMP 12/19/2013   SpO2 96%   BMI 22.14 kg/m    BMI= Body mass index is 22.14 kg/m .  General:  Appearance is normal, no acute distress  HEENT:  NC/AT, sclera appear white  Neck:  No thyroid nodule palpable.   Neck:  No obvious thyromegaly  CV/Lungs:  Non distressed breathing  Skin:  No apparent rashes.   Neuro:  Normal mental status  Psych:  Normal affect    Results.  Mixed Meal Test:  C Peptide   Date Value Ref Range Status   09/07/2023 2.2 0.9 - 6.9 ng/mL Final   09/07/2023 1.8 0.9 - 6.9 ng/mL Final   09/15/2022 1.6 0.9 - 6.9 ng/mL Final   09/15/2022 1.2 0.9 - 6.9 ng/mL Final   09/15/2022 0.3 (L) 0.9 - 6.9 ng/mL Final   08/20/2020 3.4 0.9 - 6.9 ng/mL Final   08/20/2020 1.5 0.9 - 6.9 ng/mL Final   08/20/2020 0.5 (L) 0.9 - 6.9 ng/mL Final   05/16/2019 1.8 0.9 - 6.9 ng/mL Final   05/16/2019 1.5 0.9 - 6.9 ng/mL Final     Glucose   Date Value Ref Range Status   04/24/2024 159 (H) 70 - 99 mg/dL Final   03/06/2024 97 70 - 99 mg/dL Final    03/05/2024 104 (H) 70 - 99 mg/dL Final   06/14/2022 104 (H) 70 - 99 mg/dL Final   04/16/2022 241 (H) 70 - 99 mg/dL Final   02/01/2022 90 70 - 99 mg/dL Final   01/18/2022 123 (H) 70 - 99 mg/dL Final   11/26/2021 139 (H) 70 - 99 mg/dL Final   05/10/2021 169 (H) 70 - 99 mg/dL Final   03/01/2021 141 (H) 70 - 99 mg/dL Final   02/28/2021 120 (H) 70 - 99 mg/dL Final   02/16/2021 106 (H) 70 - 99 mg/dL Final   10/30/2020 126 (H) 70 - 99 mg/dL Final     GLUCOSE BY METER POCT   Date Value Ref Range Status   03/06/2024 137 (H) 70 - 99 mg/dL Final   03/06/2024 104 (H) 70 - 99 mg/dL Final   03/05/2024 168 (H) 70 - 99 mg/dL Final   03/05/2024 212 (H) 70 - 99 mg/dL Final   03/05/2024 112 (H) 70 - 99 mg/dL Final       Hemoglobin A1c  Lab Results   Component Value Date    A1C 6.7 03/03/2024    A1C 7.1 09/07/2023    A1C 6.6 04/03/2023    A1C 6.5 01/19/2023    A1C 7.7 09/15/2022    A1C 6.8 05/10/2021    A1C 6.9 02/16/2021    A1C 6.7 08/20/2020    A1C 7.1 06/05/2020    A1C 6.9 02/06/2020       Liver enzymes  Lab Results   Component Value Date    AST 27 04/24/2024    AST 24 05/10/2021     Lab Results   Component Value Date    ALT 27 04/24/2024    ALT 35 05/10/2021     Lab Results   Component Value Date    BILICONJ 0.0 05/12/2014      Lab Results   Component Value Date    BILITOTAL 0.5 04/24/2024    BILITOTAL 0.3 05/10/2021     Lab Results   Component Value Date    ALBUMIN 4.1 04/24/2024    ALBUMIN 3.2 04/16/2022    ALBUMIN 3.4 05/10/2021     Lab Results   Component Value Date    PROTTOTAL 6.7 04/24/2024    PROTTOTAL 6.9 05/10/2021      Lab Results   Component Value Date    ALKPHOS 96 04/24/2024    ALKPHOS 132 05/10/2021       Creatinine:  Creatinine   Date Value Ref Range Status   04/24/2024 0.95 0.51 - 0.95 mg/dL Final   05/10/2021 0.81 0.52 - 1.04 mg/dL Final   ]    Complete Blood Count:  CBC RESULTS:   Recent Labs   Lab Test 03/06/24  0656 03/05/24  0742   WBC  --  6.9   RBC  --  3.73*   HGB 11.4* 11.3*   HCT  --  35.1   MCV  --  94    MCH  --  30.3   MCHC  --  32.2   RDW  --  14.7   PLT  --  275       Cholesterol  Lab Results   Component Value Date    CHOL 157 04/24/2024    CHOL 192 09/17/2020     Lab Results   Component Value Date    HDL 63 04/24/2024    HDL 91 09/17/2020     Lab Results   Component Value Date    LDL 71 04/24/2024    LDL 84 09/17/2020     Lab Results   Component Value Date    TRIG 116 04/24/2024    TRIG 83 09/17/2020     Lab Results   Component Value Date    CHOLHDLRATIO 1.9 11/20/2014           Again, thank you for allowing me to participate in the care of your patient.        Sincerely,        Adriana Robles MD

## 2024-05-02 NOTE — NURSING NOTE
"Chief Complaint   Patient presents with    Follow Up     TPIAT return eval     Vital signs:  Temp: 98.2  F (36.8  C) Temp src: Oral BP: (!) 142/81 Pulse: 71     SpO2: 96 %       Weight: 58.1 kg (128 lb)  Estimated body mass index is 22.14 kg/m  as calculated from the following:    Height as of 4/12/24: 1.619 m (5' 3.75\").    Weight as of this encounter: 58.1 kg (128 lb).      Gabriel Love RN on 5/2/2024 at 1:17 PM    "

## 2024-05-03 NOTE — PROGRESS NOTES
[facility]  Islet Autotransplant, Diabetes Follow Up    Problem List:  Patient Active Problem List   Diagnosis    Iron deficiency anemia secondary to inadequate dietary iron intake    Gastric bypass status for obesity    Health Care Home    Chronic pain syndrome    Exocrine pancreatic insufficiency    Asplenia    BLU (obstructive sleep apnea)    H/O of rectopexy    Dysthymia    Anxiety    Thyroid nodule    Acquired hypothyroidism    Post-pancreatectomy diabetes (H)    Other iron deficiency anemia    Urge incontinence    Urinary urgency    High-tone pelvic floor dysfunction    Pyuria    Recurrent kidney stones    Vaginal atrophy    Enteric hyperoxaluria    Hypocitraturia    Acute sepsis (H)    S/P partial hysterectomy         Assessment:  1.  Post pancreatectomy diabetes mellitus, s/p total pancreatectomy and islet autotransplant.  (ICD-10 E89.1)  2.  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  Rapid gastric emptying-- must use Viokace as she does not respond to enteric coated enzymes  4.  Concern for recurring cholangitis (keep on list for FMT options when available)    Lynn is a 59 year old with history of gastric bypass, chronic pancreatitis who is s/p total pancreatectomy and islet autotransplant.  She was insulin independent until 6/6/2017 (just after 4 years post-tx) and now has partial islet function.    She is on Fiasp due to rapid gastric emptying and post meal hypoglycemia.  She is on Viokace because she only responds to non enteric coated enzymes and otherwise has severe malabsorption.  She has had another episode of elevated LFTs and abdominal pain recently that is somewhat concerning for cholangitis (but doesn't quite fit in absence of elevated WBC and resolution without antibiotics)    She remains on the Omnipod 5.  Main issue persistent is post-prandial hyperglycemia so we again adjusted her I:C ratio  as below.      Plan:  1.  Changes to current diabetes regimen:  Patient Instructions   1)  Changed I:C ratio from 12g to 10g so you will get a bigger bolus with food.    Follow Up Sept 19 at 3pm    2.  Frequency of blood sugar checks:  premeal and bedtime, and as needed for hypoglycemia (6 strip per day).    3.  Continue routine follow up for autoislet transplant patients:  Mixed meal test (6 mL/kg BoostHP to max of 360 mL) at 3 months, 6 months, and once a year post transplant.  Hemoglobin A1c levels at these time points and quarterly.    4.  Other issues addressed today:  As above    Follow up:  As above        Contact me for questions at [phone] or [phone].  Emergency number to reach pediatric endocrinology after hours is [phone].    Dr. Adriana Robles MD  Professor, Pediatric Endocrinology  [facility]  Phone:  [phone]  Electronically signed: May 3, 2024 at 11:12 AM        Review of the result(s) of each unique test - CGM, pump  Prescription drug management  40 minutes spent by me on the date of the encounter doing chart review, history and exam, documentation and further activities per the note           HPI:  Lynn is a 59 year old female here for follow up o total pancreatectomy, islet cell autotransplant, splenectomy, liver biopsy (needle core), choledochoduodenostomy, feeding jejunostomy performed on 5/10/2013.  At the time of the procedure, the patient received 178,100 IEQ, or 3,209 IEQ/kg body weight. She has had two subsequent exploratory laparatomy for small bowel obstruction. Other notable endocrine history includes a complex cystic nodule in mid right thyroid lobe with 1 cm maximum diameter (saw Dr Adorno in 11/2016) and mild hypothyroidism followed by PCP, pathology in 2018 by FNA showed a benign nodule (includes adenomatoid nodule, colloid nodule, etc.).  She had an episode of cholangitis and was hospitalized for 4 days  8/24/17 (fevers, RUQ pain, + Ecoli blood cx).    She was on NO insulin at her 1 year, 2 year, 3 year, 4 year transplant anniversaries and restart insulin just after 4 years post-tx, insulin restart date of  6/6/2017.   She had a slow opioid wean off suboxone but eventually did come off.     Other history notable for surgical procedure Admitted from 2/1- 2/5/22 for  1. Posterior suture rectopexy (Colorectal primary)  2. Sigmoidoscopy (Colorectal primary)  3. Supracervical hysterectomy with bilateral salpingooophrectomy (Uro)  4. Uterosacral ligament suspension (Uro)  5. Gan colposuspension (Uro)  6. Cystoscopy (Uro)        Interval history:  -- hospitalized in March with elevated LFTs and abdominal pain.  Had a positive norovirus in stool though unclear if that was the cause.  Story a bit suspicious for cholangitis but not really having an elevated WBC and didn't need abx.       1)  Diabetes:  Lynn continues to have partial islet graft function.   She is on Fiasp right before meal time and has rapid gastric emptying.  .     Continues on Omnipod 5 and is very happy with this.   Nighttime have been very stable.    Patterns reviewed today-- post prandial highs    No pump or CGM issues.    Pump, settings below.    Max Basal 1.0 unit(s)/hr     Basal Rates:  12a 0.15 unit(s)/hr  8a 0.25 units/hr      Target Glucose  12a-8a 130 Correct above 140  8a-12 a 120 Correct above 130     Sensitivity   12a -8a 170  8am-12a 140     Insulin to Carb Ratio  12 am 12 grams     Max Bolus 6  Active insulin time 3 hours  Reverse correction off        Total daily insulin dose= 15.7 units of Fiasp, of which 6.3 units per day is basal on pump, and reaminder is bolus.               HbA1c levels:  Lab Results   Component Value Date    A1C 6.7 03/03/2024    A1C 7.1 09/07/2023    A1C 6.6 04/03/2023    A1C 6.5 01/19/2023    A1C 7.7 09/15/2022    A1C 6.8 05/10/2021    A1C 6.9 02/16/2021    A1C 6.7 08/20/2020    A1C 7.1 06/05/2020    A1C 6.9  02/06/2020           Hypoglycemia history:   Recent history as above.  The patient has had NO episodes of severe hypoglycemia (seizure, coma, or neuroglycopenic symptoms severe enough to require assistance from another person).  Blood sugars were reviewed from the patient records and/or the meter download.        Patient is good candidate for CGM therapy.  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   --  Require frequent adjustments of the insulin treatment regimen, based on therapeutic CGM test results      2)  Nutrition./PERT: Doing OK on Viokace.  When on ZenPep, she had malabsorption to the point of needing to wear a diaper, bloating, felt awful.  Now on Viokace again, stools are formed, bloating improved,     Last nutritional studies:  Component      Latest Ref Rng & Units 9/6/2022 9/15/2022   Iron      37 - 145 ug/dL 69    Iron Sat Index      15 - 46 % 27    Iron Binding Capacity      240 - 430 ug/dL 255    Vitamin A      0.30 - 1.20 mg/L  0.49   Retinol Palmitate      0.00 - 0.10 mg/L  <0.02   Vitamin A Interp        Normal   25 OH Vit D2      ug/L  <5   25 OH Vit D3      ug/L  58   25 OH Vit D total      20 - 75 ug/L  <63   Vitamin E      5.5 - 18.0 mg/L  10.3   Vitamin E Gamma      0.0 - 6.0 mg/L  0.9   Vitamin B12      232 - 1,245 pg/mL  418       3)  Thyroid:  On levothyroxine 150 mcg daily, increased around time of last visit.  Most recent labs nl.  TSH   Date Value Ref Range Status   04/24/2024 0.01 (L) 0.30 - 4.20 uIU/mL Final   08/17/2021 2.75 0.40 - 4.00 mU/L Final   03/15/2021 2.93 0.40 - 4.00 mU/L Final      T4 Free   Date Value Ref Range Status   2020 1.05 0.76 - 1.46 ng/dL Final     Free T4   Date Value Ref Range Status   2024 1.81 (H) 0.90 - 1.70 ng/dL Final       4)  Hospitalized as above for elevated LFTs    Review of systems:  A complete Review Of Systems was performed but was negative except as noted in the HPI.    Past Medical and Surgical History:  Past Medical History:   Diagnosis Date    Benign paroxysmal positional vertigo     occ.     Calculus of kidney 05/2005    x1 on L side passed, several stones.  Has been tested for oxalate.    Chronic abdominal pain 2013    Chronic pancreatitis 2013    Depression     also occ panic spells    Diabetes (H) 5/10/2013    Total Pancreatomy with Auto Islets Transplant    Dyspepsia 1999    H. pylori   treated    Dysthymia 2016    Headaches     still periodic HA's ;  often 5X/week    Hypertension 2016    Stress related    Iron deficiency anemia 2003    relates to gastric bypass    Post-pancreatectomy diabetes melltius 2013    Sleep apnea     uses splint    Spasm of sphincter of Oddi     [place] [facility] [place]    Thyroid nodule 2016     Past Surgical History:   Procedure Laterality Date    ABDOMINOPLASTY  2002    Tummy tuck    APPENDECTOMY  1990    BUNIONECTOMY Right 1998    CBD Stent placement  2002    CBD stent; Dr. Presley     SECTION      CHOLECYSTECTOMY      COLONOSCOPY N/A 2021    Procedure: COLONOSCOPY INCOMPLETE Aborted due to incomplete prep  will need to take additional prep and return tomorrow 21;  Surgeon: Ihsan Saenz MD;  Location:  GI    COMBINED CYSTOSCOPY, RETROGRADES, URETEROSCOPY, LASER HOLMIUM LITHOTRIPSY URETER(S), INSERT STENT Right 2015    Procedure: COMBINED CYSTOSCOPY, RETROGRADES, URETEROSCOPY, LASER HOLMIUM LITHOTRIPSY URETER(S), INSERT STENT;  Surgeon: Kennedi Aldana MD;  Location:  OR     COMBINED CYSTOSCOPY, RETROGRADES, URETEROSCOPY, LASER HOLMIUM LITHOTRIPSY URETER(S), INSERT STENT Right 04/20/2015    Procedure: COMBINED CYSTOSCOPY, RETROGRADES, URETEROSCOPY, LASER HOLMIUM LITHOTRIPSY URETER(S), INSERT STENT;  Surgeon: Kennedi Aldana MD;  Location: [facility] OR    COSMETIC SURGERY  6/24/2002    Tummy tuck    CYSTECTOMY OVARIAN BENIGN Right     CYSTOSCOPY, RETROGRADES, INSERT STENT URETER(S), COMBINED  10/02/2012    Procedure: COMBINED CYSTOSCOPY, RETROGRADES, INSERT STENT URETER(S);  COMBINED CYSTOSCOPY,  , INSERT LEFT STENT URETER;  Surgeon: Johny Baez MD;  Location: [facility] OR    CYSTOSCOPY, RETROGRADES, INSERT STENT URETER(S), COMBINED Left 9/20/2022    Procedure: Cystoscopy with left retrograde pyelogram, left ureteroscopy, thulium laser lithotripsy of left ureteral calculus, stone basketing and left ureteral stent insertion;  Surgeon: Alonzo Rome MD;  Location: [facility] OR    ESOPHAGOSCOPY, GASTROSCOPY, DUODENOSCOPY (EGD), COMBINED N/A 01/24/2018    Procedure: COMBINED ESOPHAGOSCOPY, GASTROSCOPY, DUODENOSCOPY (EGD);  ESOPHAGOSCOPY, GASTROSCOPY, DUODENOSCOPY (EGD)    ;  Surgeon: Tamir Rodgers MD;  Location: [facility] GI    EXTRACORPOREAL SHOCK WAVE LITHOTRIPSY (ESWL)  10/16/2012    Procedure: EXTRACORPOREAL SHOCK WAVE LITHOTRIPSY (ESWL);  left EXTRACORPOREAL SHOCK WAVE LITHOTRIPSY (ESWL) ;  Surgeon: Johny Baez MD;  Location: [facility] OR    Gastric bypass NOS      HERNIA REPAIR  02/2015    HYSTERECTOMY SUPRACERVICAL, BILATERAL SALPINGO-OOPHORECTOMY, COMBINED N/A 02/01/2022    Procedure: Abdominal supracervical hysterectomy, bilateral salpingooophrectomy;  Surgeon: Nicole Rivera MD;  [facility]: [facility]    IRRIGATION AND DEBRIDEMENT HAND, COMBINED Left 10/30/2020    Procedure: Left hand sharp excisional debridement of skin, subcutaneous tissue and fat with a scalpel, 2 x 1 x 1 cm.;  Surgeon: Demian Renteria MD;  Location: [facility] OR    LAPAROSCOPIC LYSIS ADHESIONS N/A 02/20/2015     Procedure: LAPAROSCOPIC LYSIS ADHESIONS;  Surgeon: Aaron Early MD;  [facility]    LAPAROSCOPIC LYSIS ADHESIONS N/A 2015    Procedure: LAPAROSCOPIC LYSIS ADHESIONS;  Surgeon: Aaron Early MD;  [facility]    PANCREATECTOMY, TRANSPLANT AUTO ISLET CELL, COMBINED  05/10/2013    Procedure: COMBINED PANCREATECTOMY, TRANSPLANT AUTO ISLET CELL;  Pancreatectomy, Auto Islet Cell Transplant   hernia repair, jejunostomy tube and liver biopsies with Anesthesia General with block;  Surgeon: Aaron Early MD;  Location[facility]    Partial ileum resection  1992    RECTOPEXY ABDOMINAL N/A 2022    Procedure: RECTOPEXY, ABDOMINAL;  Surgeon: Uriah Sheridan MD;  [facility]    REPAIR PTOSIS BROW BILATERAL Bilateral 2020    Procedure: BILATERAL BROW PTOSIS REPAIR;  Surgeon: Denise Alberts MD;  Location: [facility] OR    SACROCOLPOPEXY, CYSTOSCOPY, COMBINED N/A 2022    Procedure: Uterosacral ligament suspension, pina colposuspension with Cystoscopy;  Surgeon: Nicole Rivera MD;  [facility]    SIGMOIDOSCOPY FLEXIBLE N/A 2022    Procedure: SIGMOIDOSCOPY, FLEXIBLE;  Surgeon: Uriah Sheridan MD;  [facility]    Surgery for SBO      TONSILLECTOMY, ADENOIDECTOMY, COMBINED  1997    TRANSPLANT  5/10/13    Pancreatic Auto-Islet Transplant       Family History:  New changes since last visit:  none  Family History   Problem Relation Age of Onset    Family History Negative Mother     Respiratory Father         COPD;  at 69    Genitourinary Problems Father         kidney stones    Substance Abuse Father     Depression Father     Asthma Father     Heart Disease Paternal Grandfather         M.I.    Coronary Artery Disease Paternal Grandfather     Hyperlipidemia Paternal Grandfather     Genitourinary Problems Brother         multiple brothers with kidney stones    Gastrointestinal Disease Maternal Grandmother         undiagnosed 'gut' issues    Coronary  Artery Disease Maternal Grandfather     Hyperlipidemia Maternal Grandfather     Cerebrovascular Disease Paternal Grandmother         At the age of 103    Anxiety Disorder Paternal Grandmother     Osteoporosis Paternal Grandmother     Anxiety Disorder Son     Anxiety Disorder Daughter     Asthma Daughter     Colon Cancer No family hx of        Social History:  Social History     Social History Narrative    Not on file     Does not work currently.  Mom to many foster dogs     Physical Exam:  Vitals: BP (!) 142/81 (BP Location: Right arm, Patient Position: Chair, Cuff Size: Adult Regular)   Pulse 71   Temp 98.2  F (36.8  C) (Oral)   Wt 58.1 kg (128 lb)   LMP 12/19/2013   SpO2 96%   BMI 22.14 kg/m    BMI= Body mass index is 22.14 kg/m .  General:  Appearance is normal, no acute distress  HEENT:  NC/AT, sclera appear white  Neck:  No thyroid nodule palpable.   Neck:  No obvious thyromegaly  CV/Lungs:  Non distressed breathing  Skin:  No apparent rashes.   Neuro:  Normal mental status  Psych:  Normal affect    Results.  Mixed Meal Test:  C Peptide   Date Value Ref Range Status   09/07/2023 2.2 0.9 - 6.9 ng/mL Final   09/07/2023 1.8 0.9 - 6.9 ng/mL Final   09/15/2022 1.6 0.9 - 6.9 ng/mL Final   09/15/2022 1.2 0.9 - 6.9 ng/mL Final   09/15/2022 0.3 (L) 0.9 - 6.9 ng/mL Final   08/20/2020 3.4 0.9 - 6.9 ng/mL Final   08/20/2020 1.5 0.9 - 6.9 ng/mL Final   08/20/2020 0.5 (L) 0.9 - 6.9 ng/mL Final   05/16/2019 1.8 0.9 - 6.9 ng/mL Final   05/16/2019 1.5 0.9 - 6.9 ng/mL Final     Glucose   Date Value Ref Range Status   04/24/2024 159 (H) 70 - 99 mg/dL Final   03/06/2024 97 70 - 99 mg/dL Final   03/05/2024 104 (H) 70 - 99 mg/dL Final   06/14/2022 104 (H) 70 - 99 mg/dL Final   04/16/2022 241 (H) 70 - 99 mg/dL Final   02/01/2022 90 70 - 99 mg/dL Final   01/18/2022 123 (H) 70 - 99 mg/dL Final   11/26/2021 139 (H) 70 - 99 mg/dL Final   05/10/2021 169 (H) 70 - 99 mg/dL Final   03/01/2021 141 (H) 70 - 99 mg/dL Final   02/28/2021  120 (H) 70 - 99 mg/dL Final   02/16/2021 106 (H) 70 - 99 mg/dL Final   10/30/2020 126 (H) 70 - 99 mg/dL Final     GLUCOSE BY METER POCT   Date Value Ref Range Status   03/06/2024 137 (H) 70 - 99 mg/dL Final   03/06/2024 104 (H) 70 - 99 mg/dL Final   03/05/2024 168 (H) 70 - 99 mg/dL Final   03/05/2024 212 (H) 70 - 99 mg/dL Final   03/05/2024 112 (H) 70 - 99 mg/dL Final       Hemoglobin A1c  Lab Results   Component Value Date    A1C 6.7 03/03/2024    A1C 7.1 09/07/2023    A1C 6.6 04/03/2023    A1C 6.5 01/19/2023    A1C 7.7 09/15/2022    A1C 6.8 05/10/2021    A1C 6.9 02/16/2021    A1C 6.7 08/20/2020    A1C 7.1 06/05/2020    A1C 6.9 02/06/2020       Liver enzymes  Lab Results   Component Value Date    AST 27 04/24/2024    AST 24 05/10/2021     Lab Results   Component Value Date    ALT 27 04/24/2024    ALT 35 05/10/2021     Lab Results   Component Value Date    BILICONJ 0.0 05/12/2014      Lab Results   Component Value Date    BILITOTAL 0.5 04/24/2024    BILITOTAL 0.3 05/10/2021     Lab Results   Component Value Date    ALBUMIN 4.1 04/24/2024    ALBUMIN 3.2 04/16/2022    ALBUMIN 3.4 05/10/2021     Lab Results   Component Value Date    PROTTOTAL 6.7 04/24/2024    PROTTOTAL 6.9 05/10/2021      Lab Results   Component Value Date    ALKPHOS 96 04/24/2024    ALKPHOS 132 05/10/2021       Creatinine:  Creatinine   Date Value Ref Range Status   04/24/2024 0.95 0.51 - 0.95 mg/dL Final   05/10/2021 0.81 0.52 - 1.04 mg/dL Final   ]    Complete Blood Count:  CBC RESULTS:   Recent Labs   Lab Test 03/06/24  0656 03/05/24  0742   WBC  --  6.9   RBC  --  3.73*   HGB 11.4* 11.3*   HCT  --  35.1   MCV  --  94   MCH  --  30.3   MCHC  --  32.2   RDW  --  14.7   PLT  --  275       Cholesterol  Lab Results   Component Value Date    CHOL 157 04/24/2024    CHOL 192 09/17/2020     Lab Results   Component Value Date    HDL 63 04/24/2024    HDL 91 09/17/2020     Lab Results   Component Value Date    LDL 71 04/24/2024    LDL 84 09/17/2020     Lab  Results   Component Value Date    TRIG 116 04/24/2024    TRIG 83 09/17/2020     Lab Results   Component Value Date    CHOLHDLRATIO 1.9 11/20/2014

## 2024-05-10 ENCOUNTER — LAB (OUTPATIENT)
Dept: ONCOLOGY | Facility: CLINIC | Age: 59
End: 2024-05-10
Attending: INTERNAL MEDICINE
Payer: COMMERCIAL

## 2024-05-10 DIAGNOSIS — D50.9 IRON DEFICIENCY ANEMIA, UNSPECIFIED IRON DEFICIENCY ANEMIA TYPE: ICD-10-CM

## 2024-05-10 LAB
FERRITIN SERPL-MCNC: 227 NG/ML (ref 11–328)
IRON BINDING CAPACITY (ROCHE): 279 UG/DL (ref 240–430)
IRON SATN MFR SERPL: 51 % (ref 15–46)
IRON SERPL-MCNC: 141 UG/DL (ref 37–145)

## 2024-05-10 PROCEDURE — 36415 COLL VENOUS BLD VENIPUNCTURE: CPT

## 2024-05-10 PROCEDURE — 83550 IRON BINDING TEST: CPT | Performed by: INTERNAL MEDICINE

## 2024-05-10 PROCEDURE — 82728 ASSAY OF FERRITIN: CPT | Performed by: INTERNAL MEDICINE

## 2024-05-10 NOTE — PROGRESS NOTES
Medical Assistant Note:  Lynn Thompson presents today for blood draw.    Patient seen by provider today: No.   present during visit today: Not Applicable.    Concerns: No Concerns.    Procedure:  Lab draw site: rt antecub, Needle type: butterfly, Gauge: 23.    Post Assessment:  Labs drawn without difficulty: Yes.    Discharge Plan:  Departure Mode: Ambulatory.    Face to Face Time: 10.    Rehana Mcnair CMA

## 2024-05-13 ENCOUNTER — LAB (OUTPATIENT)
Dept: ONCOLOGY | Facility: CLINIC | Age: 59
End: 2024-05-13
Attending: INTERNAL MEDICINE
Payer: COMMERCIAL

## 2024-05-13 DIAGNOSIS — D64.9 ANEMIA: ICD-10-CM

## 2024-05-13 DIAGNOSIS — D64.9 ANEMIA: Primary | ICD-10-CM

## 2024-05-13 LAB
BASOPHILS # BLD AUTO: 0.1 10E3/UL (ref 0–0.2)
BASOPHILS NFR BLD AUTO: 1 %
EOSINOPHIL # BLD AUTO: 0.1 10E3/UL (ref 0–0.7)
EOSINOPHIL NFR BLD AUTO: 2 %
ERYTHROCYTE [DISTWIDTH] IN BLOOD BY AUTOMATED COUNT: 14.7 % (ref 10–15)
HCT VFR BLD AUTO: 37.5 % (ref 35–47)
HGB BLD-MCNC: 12.1 G/DL (ref 11.7–15.7)
IMM GRANULOCYTES # BLD: 0 10E3/UL
IMM GRANULOCYTES NFR BLD: 0 %
LYMPHOCYTES # BLD AUTO: 2.2 10E3/UL (ref 0.8–5.3)
LYMPHOCYTES NFR BLD AUTO: 38 %
MCH RBC QN AUTO: 30.2 PG (ref 26.5–33)
MCHC RBC AUTO-ENTMCNC: 32.3 G/DL (ref 31.5–36.5)
MCV RBC AUTO: 94 FL (ref 78–100)
MONOCYTES # BLD AUTO: 0.6 10E3/UL (ref 0–1.3)
MONOCYTES NFR BLD AUTO: 11 %
NEUTROPHILS # BLD AUTO: 2.7 10E3/UL (ref 1.6–8.3)
NEUTROPHILS NFR BLD AUTO: 48 %
NRBC # BLD AUTO: 0 10E3/UL
NRBC BLD AUTO-RTO: 0 /100
PLATELET # BLD AUTO: 327 10E3/UL (ref 150–450)
RBC # BLD AUTO: 4.01 10E6/UL (ref 3.8–5.2)
WBC # BLD AUTO: 5.7 10E3/UL (ref 4–11)

## 2024-05-13 PROCEDURE — 85025 COMPLETE CBC W/AUTO DIFF WBC: CPT | Performed by: INTERNAL MEDICINE

## 2024-05-13 PROCEDURE — 36415 COLL VENOUS BLD VENIPUNCTURE: CPT

## 2024-05-13 NOTE — PROGRESS NOTES
Medical Assistant Note:  Lynn Thompson presents today for blood draw.    Patient seen by provider today: No.   present during visit today: Not Applicable.    Concerns: No Concerns.    Procedure:  Lab draw site: left antecub, Needle type: butterfly, Gauge: 23.    Post Assessment:  Labs drawn without difficulty: Yes.    Discharge Plan:  Departure Mode: Ambulatory.    Face to Face Time: 10 minutes.    Susi Kim MA

## 2024-05-16 DIAGNOSIS — E13.9 POST-PANCREATECTOMY DIABETES (H): ICD-10-CM

## 2024-05-16 DIAGNOSIS — Z90.410 POST-PANCREATECTOMY DIABETES (H): ICD-10-CM

## 2024-05-16 DIAGNOSIS — E89.1 POST-PANCREATECTOMY DIABETES (H): ICD-10-CM

## 2024-05-16 RX ORDER — PROCHLORPERAZINE 25 MG/1
SUPPOSITORY RECTAL
Qty: 3 EACH | Refills: 5 | Status: SHIPPED | OUTPATIENT
Start: 2024-05-16

## 2024-06-22 ENCOUNTER — TRANSFERRED RECORDS (OUTPATIENT)
Dept: HEALTH INFORMATION MANAGEMENT | Facility: CLINIC | Age: 59
End: 2024-06-22

## 2024-07-16 ENCOUNTER — TRANSFERRED RECORDS (OUTPATIENT)
Dept: HEALTH INFORMATION MANAGEMENT | Facility: CLINIC | Age: 59
End: 2024-07-16
Payer: COMMERCIAL

## 2024-07-29 ENCOUNTER — TRANSFERRED RECORDS (OUTPATIENT)
Dept: HEALTH INFORMATION MANAGEMENT | Facility: CLINIC | Age: 59
End: 2024-07-29
Payer: COMMERCIAL

## 2024-07-31 ENCOUNTER — TRANSFERRED RECORDS (OUTPATIENT)
Dept: HEALTH INFORMATION MANAGEMENT | Facility: CLINIC | Age: 59
End: 2024-07-31

## 2024-08-16 ENCOUNTER — PRE VISIT (OUTPATIENT)
Dept: UROLOGY | Facility: CLINIC | Age: 59
End: 2024-08-16
Payer: COMMERCIAL

## 2024-08-16 NOTE — TELEPHONE ENCOUNTER
Reason for visit: stone prevention     Relevant information: 6 month follow up samuel 02/2024    Records/imaging/labs/orders: [facility] in media tab from 07/2024    Pt called: No need for a call    At Rooming: n/a    Sydney Vigil RN  8/16/2024  4:19 PM

## 2024-08-20 ENCOUNTER — VIRTUAL VISIT (OUTPATIENT)
Dept: NEPHROLOGY | Facility: CLINIC | Age: 59
End: 2024-08-20
Payer: COMMERCIAL

## 2024-08-20 DIAGNOSIS — N20.0 NEPHROLITHIASIS: Primary | ICD-10-CM

## 2024-08-20 DIAGNOSIS — R82.992 ENTERIC HYPEROXALURIA: ICD-10-CM

## 2024-08-20 PROCEDURE — 99214 OFFICE O/P EST MOD 30 MIN: CPT | Mod: 95 | Performed by: INTERNAL MEDICINE

## 2024-08-20 NOTE — NURSING NOTE
Current patient location: [id number] [facility] [id number]    Is the patient currently in the state of [place]? YES    Visit mode:VIDEO    If the visit is dropped, the patient can be reconnected by: VIDEO VISIT: Text to cell phone:   Telephone Information:   Mobile [phone]       Will anyone else be joining the visit? NO  (If patient encounters technical issues they should call [phone]    How would you like to obtain your AVS? MyChart    Are changes needed to the allergy or medication list? No    Are refills needed on medications prescribed by this physician? NO    Rooming Documentation:  Questionnaire(s) completed      Reason for visit: RECHECK    Carter LINARESF

## 2024-08-20 NOTE — LETTER
8/20/2024       RE: Lynn Thompson  [address] [facility] [address]     Dear Colleague,    Thank you for referring your patient, Lynn Thompson, to the [facility] [place] at [facility]. Please see a copy of my visit note below.    [facility]  08/20/2024       Lynn Thompson MRN:[id number] YOB: 1965  Primary care provider: Maryana Brooks  Requesting physician: Alonzo Rome     ASSESSMENT AND RECOMMENDATIONS:   Lynn Thompson is a 59 year old presenting for nephrolithiasis.  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    # acceptable renal function based on Oct 2022 studies  - iohexol GFR 66 ml/min/1.73 m2 (raw clearance 59 ml/min)  - cystatin C GFR 73  - creatinine based eGFR 80's so creatinine does overestimate renal function in Lynn    # history Quinn en Y gastric bypass  # history Crohns - s/p partial resection    At risk for making more kidney stones so will benefit from urine chemistry/supersaturation evaluation and monitoring and regular follow up for kidney stone prevention.    Other co-morbidities:  # history chronic pancreatitis  # total pancreatectomy with auto islet 2013 - follows with Dr. 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    Known issue that I take into account for other medical decisions, no current exacerbations or new concerns.     Repeat 24 hour chemistries in 2025  Repeat imaging per Dr. Rome  Return in about 1 year (around 2025).         Hannah Murcia MD  [facility]  Department of Medicine  Division of Renal Disease and Hypertension  [facility]  Vocera Web Console        REASON FOR VISIT: nephrolithiasis    HISTORY OF PRESENT ILLNESS:  Lynn Thompson is a 59 year old with history Crohn's with ileal resection , gale en Y gastric bypass (), chronic pancreatitis s/p pancreatectomy and auto islet cell transplant () with feeding tube 4-5 months after, post pancreatectomy diabetes (), kidney stones who presents for follow up. Previously saw Dr. Solano, last visit in , for recurrent kidney stones with severe hyperoxaluria was managed with calcium carbonate suspension and potassium citrate.    Has history of intermittent SBO and in past dependent on liquid diet, has required lysis of adhesions in past multiple times.    Stone surgical history:   2022 Left URS, laser lithotripsy, stone basketing  2015 right URS and lithotripsy with stone basketing  3/23/15 right URS, lithotripsy  10/16/2012 left ESWL  3/2011 right stent for ureteral stone and obstruction  2011 right URS and stone extraction  3/5/2008 right and left ESWL    Last imagin2022 ultrasound - no stones mentioned on report    Stone [place]:   Left: cleared 99% 2022  Right: 4 mm  lower pole stone noted on CT 9/19/2022    Stone diet:  Fluids - 1 cup coffee, 1 diet coke, water (plain), seldom herbal tea, alcohol less than 4 drinks per year  Proteins - chicken, eggs, ground beef or ground turkey, fish  Calcium - yogurt, cheese, calcium citrate with vitamin D - 1 pill am and 1 pill pm  Some whole beans and grains  Fruit and vegetable - watches sugar content of fruit, berries, grapefruit, watermelon, pineapple, oranges  Broccoli, stir lovell, chopped cabbage salad, carrots, spinach, potato  No spinach    Minimizes sugar due to diarrhea with sugar intake    Stone medications  Potassium citrate 10 meq bid  Calcium citrate 1 bid with meals    Last visit 8/2023, discussed taking calcium citrate with meals to control enteric hyperoxaluria and advised to increase to 2 twice per day with meals but she has not been doing that, thinks it slipped her mind.    Was experiencing some lower back pain but thinks it is musculoskeletal related to fall. No hematuria or dysuria. Generally doing pretty good. Weight is stable.    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    Problem list  Patient Active Problem List   Diagnosis    Iron deficiency anemia secondary to inadequate dietary iron intake    Gastric bypass status for obesity    Chronic pain syndrome    Exocrine pancreatic insufficiency    Asplenia    BLU (obstructive sleep apnea)    H/O of rectopexy    Dysthymia    Anxiety    Thyroid nodule    Acquired hypothyroidism    Post-pancreatectomy diabetes (H)    Other iron deficiency anemia    Urge incontinence    Urinary urgency    High-tone pelvic floor dysfunction    Pyuria    Recurrent kidney stones    Vaginal atrophy    Enteric hyperoxaluria     Hypocitraturia    Acute sepsis (H)    S/P partial hysterectomy     PSH  Past Surgical History:   Procedure Laterality Date    ABDOMINOPLASTY  2002    Tummy tuck    APPENDECTOMY  1990    BUNIONECTOMY Right 1998    CBD Stent placement  2002    CBD stent; Dr. Presley     SECTION      CHOLECYSTECTOMY      COLONOSCOPY N/A 2021    Procedure: COLONOSCOPY INCOMPLETE Aborted due to incomplete prep  will need to take additional prep and return tomorrow 21;  Surgeon: Ihsan Saenz MD;  Location: [facility]    COMBINED CYSTOSCOPY, RETROGRADES, URETEROSCOPY, LASER HOLMIUM LITHOTRIPSY URETER(S), INSERT STENT Right 2015    Procedure: COMBINED CYSTOSCOPY, RETROGRADES, URETEROSCOPY, LASER HOLMIUM LITHOTRIPSY URETER(S), INSERT STENT;  Surgeon: Kennedi Aldana MD;  Location: [facility]    COMBINED CYSTOSCOPY, RETROGRADES, URETEROSCOPY, LASER HOLMIUM LITHOTRIPSY URETER(S), INSERT STENT Right 2015    Procedure: COMBINED CYSTOSCOPY, RETROGRADES, URETEROSCOPY, LASER HOLMIUM LITHOTRIPSY URETER(S), INSERT STENT;  Surgeon: Kennedi Aldana MD;  Location: [facility]    COSMETIC SURGERY  2002    Tummy tuck    CYSTECTOMY OVARIAN BENIGN Right     CYSTOSCOPY, RETROGRADES, INSERT STENT URETER(S), COMBINED  10/02/2012    Procedure: COMBINED CYSTOSCOPY, RETROGRADES, INSERT STENT URETER(S);  COMBINED CYSTOSCOPY,  , INSERT LEFT STENT URETER;  Surgeon: Johny Baez MD;  Location: [facility]    CYSTOSCOPY, RETROGRADES, INSERT STENT URETER(S), COMBINED Left 2022    Procedure: Cystoscopy with left retrograde pyelogram, left ureteroscopy, thulium laser lithotripsy of left ureteral calculus, stone basketing and left ureteral stent insertion;  Surgeon: Alonzo Rome MD;  Location: [facility]    ESOPHAGOSCOPY, GASTROSCOPY, DUODENOSCOPY (EGD), COMBINED N/A 2018    Procedure: COMBINED ESOPHAGOSCOPY, GASTROSCOPY, DUODENOSCOPY (EGD);  ESOPHAGOSCOPY, GASTROSCOPY, DUODENOSCOPY (EGD)    ;  Surgeon:  Tamir Rodgers MD;  Location: [facility]    EXTRACORPOREAL SHOCK WAVE LITHOTRIPSY (ESWL)  10/16/2012    Procedure: EXTRACORPOREAL SHOCK WAVE LITHOTRIPSY (ESWL);  left EXTRACORPOREAL SHOCK WAVE LITHOTRIPSY (ESWL) ;  Surgeon: Johny Baez MD;  Location: [facility]    Gastric bypass NOS      HERNIA REPAIR  02/2015    HYSTERECTOMY SUPRACERVICAL, BILATERAL SALPINGO-OOPHORECTOMY, COMBINED N/A 02/01/2022    Procedure: Abdominal supracervical hysterectomy, bilateral salpingooophrectomy;  Surgeon: Nicole Rivera MD;  [facility]    IRRIGATION AND DEBRIDEMENT HAND, COMBINED Left 10/30/2020    Procedure: Left hand sharp excisional debridement of skin, subcutaneous tissue and fat with a scalpel, 2 x 1 x 1 cm.;  Surgeon: Demian Renteria MD;  Location: [facility]    LAPAROSCOPIC LYSIS ADHESIONS N/A 02/20/2015    Procedure: LAPAROSCOPIC LYSIS ADHESIONS;  Surgeon: Aaron Early MD;  [facility]    LAPAROSCOPIC LYSIS ADHESIONS N/A 12/29/2015    Procedure: LAPAROSCOPIC LYSIS ADHESIONS;  Surgeon: Aaron Early MD;  [facility]    PANCREATECTOMY, TRANSPLANT AUTO ISLET CELL, COMBINED  05/10/2013    Procedure: COMBINED PANCREATECTOMY, TRANSPLANT AUTO ISLET CELL;  Pancreatectomy, Auto Islet Cell Transplant   hernia repair, jejunostomy tube and liver biopsies with Anesthesia General with block;  Surgeon: Aaron Early MD;  [facility]    Partial ileum resection  1992    RECTOPEXY ABDOMINAL N/A 02/01/2022    Procedure: RECTOPEXY, ABDOMINAL;  Surgeon: Uriah Sheridan MD;  [facility]    REPAIR PTOSIS BROW BILATERAL Bilateral 06/09/2020    Procedure: BILATERAL BROW PTOSIS REPAIR;  Surgeon: Denise Alberts MD;  Location: [facility]    SACROCOLPOPEXY, CYSTOSCOPY, COMBINED N/A 02/01/2022    Procedure: Uterosacral ligament suspension, pina colposuspension with Cystoscopy;  Surgeon: Nicole Rivera MD;  [facility]    SIGMOIDOSCOPY FLEXIBLE N/A 02/01/2022    Procedure: SIGMOIDOSCOPY,  FLEXIBLE;  Surgeon: Uriah Sheridan MD;  [facility]    Surgery for SBO  2015    TONSILLECTOMY, ADENOIDECTOMY, COMBINED  1997    TRANSPLANT  5/10/13    Pancreatic Auto-Islet Transplant       Social  Disability  Rescues dogs  Social History     Socioeconomic History    Marital status:      Spouse name: Tera    Number of children: 2    Years of education: Not on file    Highest education level: Some college, no degree   Occupational History    Occupation: customer service     Employer: [employer]   Tobacco Use    Smoking status: Never    Smokeless tobacco: Never   Vaping Use    Vaping status: Never Used   Substance and Sexual Activity    Alcohol use: Yes     Comment: once every 4 months    Drug use: Not Currently    Sexual activity: Yes     Partners: Male     Birth control/protection: Post-menopausal   Other Topics Concern    Parent/sibling w/ CABG, MI or angioplasty before 65F 55M? No   Social History Narrative    Not on file     Social Determinants of Health     Financial Resource Strain: Low Risk  (4/12/2024)    Financial Resource Strain     Within the past 12 months, have you or your family members you live with been unable to get utilities (heat, electricity) when it was really needed?: No   Food Insecurity: Low Risk  (4/12/2024)    Food Insecurity     Within the past 12 months, did you worry that your food would run out before you got money to buy more?: No     Within the past 12 months, did the food you bought just not last and you didn t have money to get more?: No   Transportation Needs: Low Risk  (4/12/2024)    Transportation Needs     Within the past 12 months, has lack of transportation kept you from medical appointments, getting your medicines, non-medical meetings or appointments, work, or from getting things that you need?: No   Physical Activity: Insufficiently Active (4/12/2024)    Exercise Vital Sign     Days of Exercise per Week: 2 days     Minutes of Exercise per Session: 30  min   Stress: Stress Concern Present (4/12/2024)    [employer] - Occupational Stress Questionnaire     Feeling of Stress : Rather much   Social Connections: Socially Integrated (4/12/2024)    Social Connection and Isolation Panel [NHANES]     Frequency of Communication with Friends and Family: More than three times a week     Frequency of Social Gatherings with Friends and Family: Twice a week     Attends [employer] Services: More than 4 times per year     Active Member of Clubs or Organizations: Yes     Attends Club or Organization Meetings: 1 to 4 times per year     Marital Status:    Interpersonal Safety: Low Risk  (4/12/2024)    Interpersonal Safety     Do you feel physically and emotionally safe where you currently live?: Yes     Within the past 12 months, have you been hit, slapped, kicked or otherwise physically hurt by someone?: No     Within the past 12 months, have you been humiliated or emotionally abused in other ways by your partner or ex-partner?: No   Housing Stability: Low Risk  (4/12/2024)    Housing Stability     Do you have housing? : Yes     Are you worried about losing your housing?: No     Family history  + kidney stones  No kidney failure       MEDICATIONS:  Current Outpatient Medications   Medication Sig Dispense Refill    calcium carbonate 600 mg-vitamin D 400 units (CALTRATE) 600-400 MG-UNIT per tablet Take 1 tablet by mouth 2 times daily      Continuous Blood Gluc Transmit (DEXCOM G6 TRANSMITTER) MISC CHANGE EVERY 90 DAYS 1 each 1    Continuous Glucose Sensor (DEXCOM G6 SENSOR) MISC CHANGE EVERY 10 DAYS 3 each 5    DULoxetine (CYMBALTA) 20 MG capsule Take 20 mg by mouth daily      escitalopram (LEXAPRO) 20 MG tablet Take 20 mg by mouth daily      famotidine (PEPCID) 40 MG tablet Take 1 tablet (40 mg) by mouth 2 times daily as needed for heartburn 180 tablet 3    FIASP 100 UNIT/ML SOLN Inject 15 units daily via insulin pump. May use up to 85 units daily.  90 mL 0    FIASP PENFILL 100 UNIT/ML SOCT Inject 0.5-4 Units Subcutaneous 4 times daily, INJECT with meals and nightly (Patient not taking: Reported on 5/2/2024) 15 mL 0    gabapentin (NEURONTIN) 300 MG capsule Take 300 mg by mouth nightly as needed      Glucagon (GVOKE HYPOPEN 1-PACK) 1 MG/0.2ML SOAJ Inject 1 mg Subcutaneous once as needed (for hypoglycemia with loss of consciousness) (Patient not taking: Reported on 5/2/2024) 15 mL 5    hydrOXYzine (ATARAX) 25 MG tablet Take 25 mg by mouth daily as needed (Patient not taking: Reported on 5/2/2024)  0    Insulin Disposable Pump (OMNIPOD 5 G6 POD, GEN 5,) MISC CHANGE EVERY 3 DAYS 30 each 5    insulin glargine (LANTUS PEN) 100 UNIT/ML pen Inject 6 units daily in the event of pump failure (Patient not taking: Reported on 5/2/2024) 15 mL 0    INSULIN PUMP - OUTPATIENT Inject Subcutaneous continuous Date Last Updated on chart: 3/4/2024  Omnipod 5, type of insulin: Fiasp  Change site every 3 days  Basal Rates in units/hr  1875-6293: 1 unit/hr  ICF (insulin to carb ratio): 12 (1 unit covers 12g carbs)  ISF (insulin sensitivity factor): 140-170 (1 unit lowers BG by 140-170mg/dL)- very sernsitive  Target BG: Upper: 180, lower: 70      levothyroxine (SYNTHROID/LEVOTHROID) 125 MCG tablet Take 1 tablet (125 mcg) by mouth daily 90 tablet 3    lipase-protease-amylase (VIOKACE) 29261-15[address]-84713 units TABS tablet Take 6 with meals and 3 with snacks; approximately daily maximum of 20 capsules      loratadine (CLARITIN) 10 MG tablet Take 10 mg by mouth daily as needed       omeprazole (PRILOSEC) 40 MG DR capsule Take 1 capsule (40 mg) by mouth 2 times daily Take 30-60 minutes before a meal. 180 capsule 3    potassium citrate (UROCIT-K) 10 MEQ (1080 MG) CR tablet Take 10 mEq by mouth 2 times daily (with meals)      traZODone (DESYREL) 50 MG tablet Take 50 mg by mouth at bedtime          ALLERGIES:    Allergies   Allergen Reactions    Corticosteroids Other (See Comments)     All  oral, IV and injectable steroids are contraindicated (unless in life threatening situations) in Islet Auto transplant recipients. They can cause irreversible loss of islet cell function. Please contact patient's transplant care coordinator, Erlinda Multani RN BSN at [phone]/pager: [phone] and endocrinologist prior to administration.      Povidone Iodine Hives     Causes skin to blister    Nsaids      naprosyn = GI upset    Sulfasalazine Nausea and Nausea and Vomiting         PHYSICAL EXAM:   video visit  GENERAL APPEARANCE: alert and no distress  RESP: normal work of breathing, no conversational dyspnea  NEURO: mentation intact and speech normal    LABS:   I reviewed:  Electrolytes/Renal -   Recent Labs   Lab Test 04/24/24  0946 03/06/24  1158 03/06/24  0656 03/05/24  0940 03/05/24  0742 03/04/24  1510 03/04/24  1504 04/03/23  1445 01/05/23  1342 02/03/22  2130 02/03/22  2056 02/03/22  0741 02/03/22  0730     --  139  --  138  --   --    < > 141   < >  --   --   --    POTASSIUM 4.1  --  4.1  --  3.8  --   --    < > 4.2   < >  --   --  3.9   CHLORIDE 106  --  104  --  104  --   --    < > 106   < >  --   --   --    CO2 25  --  27  --  24  --   --    < > 21*   < >  --   --   --    BUN 19.8  --  12.1  --  11.4  --   --    < > 21.9   < >  --   --   --    CR 0.95  --  0.80  --  0.81  --   --    < > 0.94   < >  --   --   --    * 137* 97   < > 104*   < >  --    < > 111*   < >  --    < >  --    VALARIE 9.2  --  8.8  --  8.4*  --   --    < > 9.0   < >  --   --   --    MAG  --   --  1.5*  --  2.0  --  2.7*   < > 1.8   < >  --   --  1.8   PHOS  --   --   --   --   --   --   --   --  4.2  --  2.7  --  1.8*    < > = values in this interval not displayed.       CBC -   Recent Labs   Lab Test 05/13/24  1458 03/06/24  0656 03/05/24  0742 03/04/24  0725   WBC 5.7  --  6.9 11.5*   HGB 12.1 11.4* 11.3* 11.5*     --  275 298       LFTs -   Recent Labs   Lab Test 04/24/24  0946 03/06/24  0656 03/05/24  0742    ALKPHOS 96 195* 189*   BILITOTAL 0.5 0.2 0.3   ALT 27 85* 108*   AST 27 43 88*   PROTTOTAL 6.7 6.0* 5.6*   ALBUMIN 4.1 3.4* 3.0*       Coags -   Recent Labs   Lab Test 03/03/24  2046   INR 0.97       Iron Panel -   Recent Labs   Lab Test 05/10/24  0930 02/12/24  0940 11/10/23  1258   IRON 141 104 81   IRONSAT 51* 48* 35   GRISELDA 227 264 255       Endocrine -   Recent Labs   Lab Test 04/24/24  0946 03/03/24  2046 09/07/23  0930 04/03/23  1445 03/24/23  1325 01/19/23  1304 01/05/23  1342   A1C  --  6.7* 7.1* 6.6*  --    < >  --    TSH 0.01*  --   --   --  0.50  --  6.56*    < > = values in this interval not displayed.       IMAGING:  reviewed     Virtual Visit Details    Type of service:  Video Visit   Joined the call at 8/20/2024, 3:40:23 pm.  Left the call at 8/20/2024, 3:54:35 pm.  Originating Location (pt. Location): Home  Distant Location (provider location):  Off-site  Platform used for Video Visit: Lizbeth Murcia MD

## 2024-08-20 NOTE — PROGRESS NOTES
[facility]  08/20/2024       Lynn Thompson MRN:[id number] YOB: 1965  Primary care provider: Maryana Brooks  Requesting physician: Alonzo Rome     ASSESSMENT AND RECOMMENDATIONS:   Lynn Thompson is a 59 year old presenting for nephrolithiasis.  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    # acceptable renal function based on Oct 2022 studies  - iohexol GFR 66 ml/min/1.73 m2 (raw clearance 59 ml/min)  - cystatin C GFR 73  - creatinine based eGFR 80's so creatinine does overestimate renal function in [place]    # history Quinn en Y gastric bypass  # history Crohns - s/p partial resection    At risk for making more kidney stones so will benefit from urine chemistry/supersaturation evaluation and monitoring and regular follow up for kidney stone prevention.    Other co-morbidities:  # history chronic pancreatitis  # total pancreatectomy with auto islet 2013 - follows with Dr. 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    Known issue that I take into account for other medical decisions, no current exacerbations or new concerns.     Repeat 24 hour chemistries in 2025  Repeat imaging per Dr. Rome  Return in about 1 year (around 2025).         Hannah Murcia MD  [facility]  Department of Medicine  Division of Renal Disease and Hypertension  Amcom  Vocera Web Console        REASON FOR VISIT: nephrolithiasis    HISTORY OF PRESENT ILLNESS:  Lynn Thompson is a 59 year old with history Crohn's with ileal resection , gale en Y gastric bypass (), chronic pancreatitis s/p pancreatectomy and auto islet cell transplant () with feeding tube 4-5 months after, post pancreatectomy diabetes (), kidney stones who presents for follow up. Previously saw Dr. Solano, last visit in , for recurrent kidney stones with severe hyperoxaluria was managed with calcium carbonate suspension and potassium citrate.    Has history of intermittent SBO and in past dependent on liquid diet, has required lysis of adhesions in past multiple times.    Stone surgical history:   2022 Left URS, laser lithotripsy, stone basketing  2015 right URS and lithotripsy with stone basketing  3/23/15 right URS, lithotripsy  10/16/2012 left ESWL  3/2011 right stent for ureteral stone and obstruction  2011 right URS and stone extraction  3/5/2008 right and left ESWL    Last imagin2022 ultrasound - no stones mentioned on report    Stone [place]:   Left: cleared 99% 2022  Right: 4 mm lower pole stone noted on CT 2022    Stone diet:  Fluids - 1 cup coffee, 1 diet coke, water (plain), seldom herbal tea, alcohol less than 4 drinks per year  Proteins - chicken, eggs, ground beef or ground turkey, fish  Calcium - yogurt, cheese, calcium citrate with vitamin D - 1 pill am and 1 pill pm  Some whole beans  and grains  Fruit and vegetable - watches sugar content of fruit, berries, grapefruit, watermelon, pineapple, oranges  Broccoli, stir lovell, chopped cabbage salad, carrots, spinach, potato  No spinach    Minimizes sugar due to diarrhea with sugar intake    Stone medications  Potassium citrate 10 meq bid  Calcium citrate 1 bid with meals    Last visit 2023, discussed taking calcium citrate with meals to control enteric hyperoxaluria and advised to increase to 2 twice per day with meals but she has not been doing that, thinks it slipped her mind.    Was experiencing some lower back pain but thinks it is musculoskeletal related to fall. No hematuria or dysuria. Generally doing pretty good. Weight is stable.    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    Problem list  Patient Active Problem List   Diagnosis    Iron deficiency anemia secondary to inadequate dietary iron intake    Gastric bypass status for obesity    Chronic pain syndrome    Exocrine pancreatic insufficiency    Asplenia    BLU (obstructive sleep apnea)    H/O of rectopexy    Dysthymia    Anxiety    Thyroid nodule    Acquired hypothyroidism    Post-pancreatectomy diabetes (H)    Other iron deficiency anemia    Urge incontinence    Urinary urgency    High-tone pelvic floor dysfunction    Pyuria    Recurrent kidney stones    Vaginal atrophy    Enteric hyperoxaluria    Hypocitraturia    Acute sepsis (H)    S/P partial hysterectomy     PSH  Past Surgical History:   Procedure Laterality Date    ABDOMINOPLASTY  2002    Tummy tuck    APPENDECTOMY  1990    BUNIONECTOMY Right 1998    CBD Stent placement  2002    CBD stent; Dr. Presley     SECTION      CHOLECYSTECTOMY       COLONOSCOPY N/A 03/31/2021    Procedure: COLONOSCOPY INCOMPLETE Aborted due to incomplete prep  will need to take additional prep and return tomorrow 4/1/21;  Surgeon: Ihsan Saenz MD;  Location: [facility]    COMBINED CYSTOSCOPY, RETROGRADES, URETEROSCOPY, LASER HOLMIUM LITHOTRIPSY URETER(S), INSERT STENT Right 03/23/2015    Procedure: COMBINED CYSTOSCOPY, RETROGRADES, URETEROSCOPY, LASER HOLMIUM LITHOTRIPSY URETER(S), INSERT STENT;  Surgeon: Kennedi Aldana MD;  [facility]: [facility]    COMBINED CYSTOSCOPY, RETROGRADES, URETEROSCOPY, LASER HOLMIUM LITHOTRIPSY URETER(S), INSERT STENT Right 04/20/2015    Procedure: COMBINED CYSTOSCOPY, RETROGRADES, URETEROSCOPY, LASER HOLMIUM LITHOTRIPSY URETER(S), INSERT STENT;  Surgeon: Kennedi Aldana MD;  Location: [facility]    COSMETIC SURGERY  6/24/2002    Tummy tuck    CYSTECTOMY OVARIAN BENIGN Right     CYSTOSCOPY, RETROGRADES, INSERT STENT URETER(S), COMBINED  10/02/2012    Procedure: COMBINED CYSTOSCOPY, RETROGRADES, INSERT STENT URETER(S);  COMBINED CYSTOSCOPY,  , INSERT LEFT STENT URETER;  Surgeon: Johny Baez MD;  Location: [facility]    CYSTOSCOPY, RETROGRADES, INSERT STENT URETER(S), COMBINED Left 9/20/2022    Procedure: Cystoscopy with left retrograde pyelogram, left ureteroscopy, thulium laser lithotripsy of left ureteral calculus, stone basketing and left ureteral stent insertion;  Surgeon: Alonzo Rome MD;  Location: [facility]    ESOPHAGOSCOPY, GASTROSCOPY, DUODENOSCOPY (EGD), COMBINED N/A 01/24/2018    Procedure: COMBINED ESOPHAGOSCOPY, GASTROSCOPY, DUODENOSCOPY (EGD);  ESOPHAGOSCOPY, GASTROSCOPY, DUODENOSCOPY (EGD)    ;  Surgeon: Tamir Rodgers MD;  Location: [facility]    EXTRACORPOREAL SHOCK WAVE LITHOTRIPSY (ESWL)  10/16/2012    Procedure: EXTRACORPOREAL SHOCK WAVE LITHOTRIPSY (ESWL);  left EXTRACORPOREAL SHOCK WAVE LITHOTRIPSY (ESWL) ;  Surgeon: Johny Baez MD;  Location: [facility]    Gastric bypass NOS      HERNIA REPAIR  02/2015     HYSTERECTOMY SUPRACERVICAL, BILATERAL SALPINGO-OOPHORECTOMY, COMBINED N/A 02/01/2022    Procedure: Abdominal supracervical hysterectomy, bilateral salpingooophrectomy;  Surgeon: Nicole Rivera MD;  [facility]    IRRIGATION AND DEBRIDEMENT HAND, COMBINED Left 10/30/2020    Procedure: Left hand sharp excisional debridement of skin, subcutaneous tissue and fat with a scalpel, 2 x 1 x 1 cm.;  Surgeon: Demian Renteria MD;  [facility]: [facility]    LAPAROSCOPIC LYSIS ADHESIONS N/A 02/20/2015    Procedure: LAPAROSCOPIC LYSIS ADHESIONS;  Surgeon: Aaron Early MD;  [facility]    LAPAROSCOPIC LYSIS ADHESIONS N/A 12/29/2015    Procedure: LAPAROSCOPIC LYSIS ADHESIONS;  Surgeon: Aaron Early MD;  [facility]    PANCREATECTOMY, TRANSPLANT AUTO ISLET CELL, COMBINED  05/10/2013    Procedure: COMBINED PANCREATECTOMY, TRANSPLANT AUTO ISLET CELL;  Pancreatectomy, Auto Islet Cell Transplant   hernia repair, jejunostomy tube and liver biopsies with Anesthesia General with block;  Surgeon: Aaron Early MD;  [facility]    Partial ileum resection  1992    RECTOPEXY ABDOMINAL N/A 02/01/2022    Procedure: RECTOPEXY, ABDOMINAL;  Surgeon: Uriah Sheridan MD;  [facility]    REPAIR PTOSIS BROW BILATERAL Bilateral 06/09/2020    Procedure: BILATERAL BROW PTOSIS REPAIR;  Surgeon: Denise Alberts MD;  Location: [facility]    SACROCOLPOPEXY, CYSTOSCOPY, COMBINED N/A 02/01/2022    Procedure: Uterosacral ligament suspension, pina colposuspension with Cystoscopy;  Surgeon: Nicole Rivera MD;  [facility]    SIGMOIDOSCOPY FLEXIBLE N/A 02/01/2022    Procedure: SIGMOIDOSCOPY, FLEXIBLE;  Surgeon: Uriah Sheridan MD;  [facility]    Surgery for SBO  2015    TONSILLECTOMY, ADENOIDECTOMY, COMBINED  1997    TRANSPLANT  5/10/13    Pancreatic Auto-Islet Transplant       Social  Disability  Rescues dogs  Social History     Socioeconomic History    Marital status:      Spouse  name: Tera    Number of children: 2    Years of education: Not on file    Highest education level: Some college, no degree   Occupational History    Occupation: customer service     Employer: [employer]   Tobacco Use    Smoking status: Never    Smokeless tobacco: Never   Vaping Use    Vaping status: Never Used   Substance and Sexual Activity    Alcohol use: Yes     Comment: once every 4 months    Drug use: Not Currently    Sexual activity: Yes     Partners: Male     Birth control/protection: Post-menopausal   Other Topics Concern    Parent/sibling w/ CABG, MI or angioplasty before 65F 55M? No   Social History Narrative    Not on file     Social Determinants of Health     Financial Resource Strain: Low Risk  (4/12/2024)    Financial Resource Strain     Within the past 12 months, have you or your family members you live with been unable to get utilities (heat, electricity) when it was really needed?: No   Food Insecurity: Low Risk  (4/12/2024)    Food Insecurity     Within the past 12 months, did you worry that your food would run out before you got money to buy more?: No     Within the past 12 months, did the food you bought just not last and you didn t have money to get more?: No   Transportation Needs: Low Risk  (4/12/2024)    Transportation Needs     Within the past 12 months, has lack of transportation kept you from medical appointments, getting your medicines, non-medical meetings or appointments, work, or from getting things that you need?: No   Physical Activity: Insufficiently Active (4/12/2024)    Exercise Vital Sign     Days of Exercise per Week: 2 days     Minutes of Exercise per Session: 30 min   Stress: Stress Concern Present (4/12/2024)    [employer] [place] [employer] - Occupational Stress Questionnaire     Feeling of Stress : Rather much   Social Connections: Socially Integrated (4/12/2024)    Social Connection and Isolation Panel [NHANES]     Frequency of Communication with Friends and  Family: More than three times a week     Frequency of Social Gatherings with Friends and Family: Twice a week     Attends Voodoo Services: More than 4 times per year     Active Member of Clubs or Organizations: Yes     Attends Club or Organization Meetings: 1 to 4 times per year     Marital Status:    Interpersonal Safety: Low Risk  (4/12/2024)    Interpersonal Safety     Do you feel physically and emotionally safe where you currently live?: Yes     Within the past 12 months, have you been hit, slapped, kicked or otherwise physically hurt by someone?: No     Within the past 12 months, have you been humiliated or emotionally abused in other ways by your partner or ex-partner?: No   Housing Stability: Low Risk  (4/12/2024)    Housing Stability     Do you have housing? : Yes     Are you worried about losing your housing?: No     Family history  + kidney stones  No kidney failure       MEDICATIONS:  Current Outpatient Medications   Medication Sig Dispense Refill    calcium carbonate 600 mg-vitamin D 400 units (CALTRATE) 600-400 MG-UNIT per tablet Take 1 tablet by mouth 2 times daily      Continuous Blood Gluc Transmit (DEXCOM G6 TRANSMITTER) MISC CHANGE EVERY 90 DAYS 1 each 1    Continuous Glucose Sensor (DEXCOM G6 SENSOR) MISC CHANGE EVERY 10 DAYS 3 each 5    DULoxetine (CYMBALTA) 20 MG capsule Take 20 mg by mouth daily      escitalopram (LEXAPRO) 20 MG tablet Take 20 mg by mouth daily      famotidine (PEPCID) 40 MG tablet Take 1 tablet (40 mg) by mouth 2 times daily as needed for heartburn 180 tablet 3    FIASP 100 UNIT/ML SOLN Inject 15 units daily via insulin pump. May use up to 85 units daily. 90 mL 0    FIASP PENFILL 100 UNIT/ML SOCT Inject 0.5-4 Units Subcutaneous 4 times daily, INJECT with meals and nightly (Patient not taking: Reported on 5/2/2024) 15 mL 0    gabapentin (NEURONTIN) 300 MG capsule Take 300 mg by mouth nightly as needed      Glucagon (GVOKE HYPOPEN 1-PACK) 1 MG/0.2ML SOAJ Inject 1 mg  Subcutaneous once as needed (for hypoglycemia with loss of consciousness) (Patient not taking: Reported on 5/2/2024) 15 mL 5    hydrOXYzine (ATARAX) 25 MG tablet Take 25 mg by mouth daily as needed (Patient not taking: Reported on 5/2/2024)  0    Insulin Disposable Pump (OMNIPOD 5 G6 POD, GEN 5,) MISC CHANGE EVERY 3 DAYS 30 each 5    insulin glargine (LANTUS PEN) 100 UNIT/ML pen Inject 6 units daily in the event of pump failure (Patient not taking: Reported on 5/2/2024) 15 mL 0    INSULIN PUMP - OUTPATIENT Inject Subcutaneous continuous Date Last Updated on chart: 3/4/2024  Omnipod 5, type of insulin: Fiasp  Change site every 3 days  Basal Rates in units/hr  4176-7536: 1 unit/hr  ICF (insulin to carb ratio): 12 (1 unit covers 12g carbs)  ISF (insulin sensitivity factor): 140-170 (1 unit lowers BG by 140-170mg/dL)- very sernsitive  Target BG: Upper: 180, lower: 70      levothyroxine (SYNTHROID/LEVOTHROID) 125 MCG tablet Take 1 tablet (125 mcg) by mouth daily 90 tablet 3    lipase-protease-amylase (VIOKACE) 08412-27098-40033 units TABS tablet Take 6 with meals and 3 with snacks; approximately daily maximum of 20 capsules      loratadine (CLARITIN) 10 MG tablet Take 10 mg by mouth daily as needed       omeprazole (PRILOSEC) 40 MG DR capsule Take 1 capsule (40 mg) by mouth 2 times daily Take 30-60 minutes before a meal. 180 capsule 3    potassium citrate (UROCIT-K) 10 MEQ (1080 MG) CR tablet Take 10 mEq by mouth 2 times daily (with meals)      traZODone (DESYREL) 50 MG tablet Take 50 mg by mouth at bedtime          ALLERGIES:    Allergies   Allergen Reactions    Corticosteroids Other (See Comments)     All oral, IV and injectable steroids are contraindicated (unless in life threatening situations) in Islet Auto transplant recipients. They can cause irreversible loss of islet cell function. Please contact patient's transplant care coordinator, Erlinda Multani RN BSN at [phone]/pager: [phone] and  endocrinologist prior to administration.      Povidone Iodine Hives     Causes skin to blister    Nsaids      naprosyn = GI upset    Sulfasalazine Nausea and Nausea and Vomiting         PHYSICAL EXAM:   video visit  GENERAL APPEARANCE: alert and no distress  RESP: normal work of breathing, no conversational dyspnea  NEURO: mentation intact and speech normal    LABS:   I reviewed:  Electrolytes/Renal -   Recent Labs   Lab Test 04/24/24  0946 03/06/24  1158 03/06/24  0656 03/05/24  0940 03/05/24  0742 03/04/24  1510 03/04/24  1504 04/03/23  1445 01/05/23  1342 02/03/22  2130 02/03/22  2056 02/03/22  0741 02/03/22  0730     --  139  --  138  --   --    < > 141   < >  --   --   --    POTASSIUM 4.1  --  4.1  --  3.8  --   --    < > 4.2   < >  --   --  3.9   CHLORIDE 106  --  104  --  104  --   --    < > 106   < >  --   --   --    CO2 25  --  27  --  24  --   --    < > 21*   < >  --   --   --    BUN 19.8  --  12.1  --  11.4  --   --    < > 21.9   < >  --   --   --    CR 0.95  --  0.80  --  0.81  --   --    < > 0.94   < >  --   --   --    * 137* 97   < > 104*   < >  --    < > 111*   < >  --    < >  --    VALARIE 9.2  --  8.8  --  8.4*  --   --    < > 9.0   < >  --   --   --    MAG  --   --  1.5*  --  2.0  --  2.7*   < > 1.8   < >  --   --  1.8   PHOS  --   --   --   --   --   --   --   --  4.2  --  2.7  --  1.8*    < > = values in this interval not displayed.       CBC -   Recent Labs   Lab Test 05/13/24  1458 03/06/24  0656 03/05/24  0742 03/04/24  0725   WBC 5.7  --  6.9 11.5*   HGB 12.1 11.4* 11.3* 11.5*     --  275 298       LFTs -   Recent Labs   Lab Test 04/24/24  0946 03/06/24  0656 03/05/24  0742   ALKPHOS 96 195* 189*   BILITOTAL 0.5 0.2 0.3   ALT 27 85* 108*   AST 27 43 88*   PROTTOTAL 6.7 6.0* 5.6*   ALBUMIN 4.1 3.4* 3.0*       Coags -   Recent Labs   Lab Test 03/03/24  2046   INR 0.97       Iron Panel -   Recent Labs   Lab Test 05/10/24  0930 02/12/24  0940 11/10/23  1258   IRON 141 104 81    MANA 51* 48* 35   GRISELDA 227 264 255       Endocrine -   Recent Labs   Lab Test 04/24/24  0946 03/03/24  2046 09/07/23  0930 04/03/23  1445 03/24/23  1325 01/19/23  1304 01/05/23  1342   A1C  --  6.7* 7.1* 6.6*  --    < >  --    TSH 0.01*  --   --   --  0.50  --  6.56*    < > = values in this interval not displayed.       IMAGING:  reviewed     Virtual Visit Details    Type of service:  Video Visit   Joined the call at 8/20/2024, 3:40:23 pm.  Left the call at 8/20/2024, 3:54:35 pm.  Originating Location (pt. Location): Home  Distant Location (provider location):  Off-site  Platform used for Video Visit: Lizbeth Murcia MD

## 2024-08-20 NOTE — PATIENT INSTRUCTIONS
Take two calcium pills with largest meal (evening meal)  Continue 1 calcium pill with the smaller meal    Total of 3 calcium pills every day    [facility] 24 hour urine July 2025    Return in about 1 year (around 8/20/2025).

## 2024-08-22 ENCOUNTER — APPOINTMENT (OUTPATIENT)
Dept: CT IMAGING | Facility: CLINIC | Age: 59
End: 2024-08-22
Attending: EMERGENCY MEDICINE
Payer: COMMERCIAL

## 2024-08-22 ENCOUNTER — HOSPITAL ENCOUNTER (OUTPATIENT)
Facility: CLINIC | Age: 59
Setting detail: OBSERVATION
Discharge: HOME | End: 2024-08-23
Attending: EMERGENCY MEDICINE | Admitting: INTERNAL MEDICINE
Payer: COMMERCIAL

## 2024-08-22 DIAGNOSIS — R10.84 GENERALIZED ABDOMINAL PAIN: ICD-10-CM

## 2024-08-22 DIAGNOSIS — R19.7 DIARRHEA, UNSPECIFIED TYPE: ICD-10-CM

## 2024-08-22 LAB
ALBUMIN SERPL BCG-MCNC: 4.1 G/DL (ref 3.5–5.2)
ALP SERPL-CCNC: 123 U/L (ref 40–150)
ALT SERPL W P-5'-P-CCNC: 32 U/L (ref 0–50)
ANION GAP SERPL CALCULATED.3IONS-SCNC: 13 MMOL/L (ref 7–15)
AST SERPL W P-5'-P-CCNC: 34 U/L (ref 0–45)
BASOPHILS # BLD AUTO: 0.1 10E3/UL (ref 0–0.2)
BASOPHILS NFR BLD AUTO: 1 %
BILIRUB SERPL-MCNC: 0.4 MG/DL
BUN SERPL-MCNC: 18.6 MG/DL (ref 8–23)
CALCIUM SERPL-MCNC: 9.6 MG/DL (ref 8.8–10.4)
CHLORIDE SERPL-SCNC: 106 MMOL/L (ref 98–107)
CREAT SERPL-MCNC: 0.98 MG/DL (ref 0.51–0.95)
EGFRCR SERPLBLD CKD-EPI 2021: 65 ML/MIN/1.73M2
EOSINOPHIL # BLD AUTO: 0.3 10E3/UL (ref 0–0.7)
EOSINOPHIL NFR BLD AUTO: 3 %
ERYTHROCYTE [DISTWIDTH] IN BLOOD BY AUTOMATED COUNT: 13.8 % (ref 10–15)
GLUCOSE SERPL-MCNC: 90 MG/DL (ref 70–99)
HCO3 SERPL-SCNC: 22 MMOL/L (ref 22–29)
HCT VFR BLD AUTO: 41.4 % (ref 35–47)
HGB BLD-MCNC: 13.2 G/DL (ref 11.7–15.7)
IMM GRANULOCYTES # BLD: 0 10E3/UL
IMM GRANULOCYTES NFR BLD: 0 %
LIPASE SERPL-CCNC: 7 U/L (ref 13–60)
LYMPHOCYTES # BLD AUTO: 1.6 10E3/UL (ref 0.8–5.3)
LYMPHOCYTES NFR BLD AUTO: 16 %
MAGNESIUM SERPL-MCNC: 1.7 MG/DL (ref 1.7–2.3)
MCH RBC QN AUTO: 29.4 PG (ref 26.5–33)
MCHC RBC AUTO-ENTMCNC: 31.9 G/DL (ref 31.5–36.5)
MCV RBC AUTO: 92 FL (ref 78–100)
MONOCYTES # BLD AUTO: 0.9 10E3/UL (ref 0–1.3)
MONOCYTES NFR BLD AUTO: 10 %
NEUTROPHILS # BLD AUTO: 7 10E3/UL (ref 1.6–8.3)
NEUTROPHILS NFR BLD AUTO: 71 %
NRBC # BLD AUTO: 0 10E3/UL
NRBC BLD AUTO-RTO: 0 /100
PLATELET # BLD AUTO: 336 10E3/UL (ref 150–450)
POTASSIUM SERPL-SCNC: 4.5 MMOL/L (ref 3.4–5.3)
PROT SERPL-MCNC: 7 G/DL (ref 6.4–8.3)
RBC # BLD AUTO: 4.49 10E6/UL (ref 3.8–5.2)
SODIUM SERPL-SCNC: 141 MMOL/L (ref 135–145)
WBC # BLD AUTO: 9.9 10E3/UL (ref 4–11)

## 2024-08-22 PROCEDURE — 85025 COMPLETE CBC W/AUTO DIFF WBC: CPT | Performed by: EMERGENCY MEDICINE

## 2024-08-22 PROCEDURE — 258N000003 HC RX IP 258 OP 636: Performed by: EMERGENCY MEDICINE

## 2024-08-22 PROCEDURE — G1010 CDSM STANSON: HCPCS

## 2024-08-22 PROCEDURE — 96374 THER/PROPH/DIAG INJ IV PUSH: CPT | Mod: XU

## 2024-08-22 PROCEDURE — 250N000011 HC RX IP 250 OP 636: Performed by: EMERGENCY MEDICINE

## 2024-08-22 PROCEDURE — 250N000009 HC RX 250: Performed by: EMERGENCY MEDICINE

## 2024-08-22 PROCEDURE — 99285 EMERGENCY DEPT VISIT HI MDM: CPT | Mod: 25

## 2024-08-22 PROCEDURE — 250N000013 HC RX MED GY IP 250 OP 250 PS 637: Performed by: EMERGENCY MEDICINE

## 2024-08-22 PROCEDURE — 99222 1ST HOSP IP/OBS MODERATE 55: CPT | Performed by: INTERNAL MEDICINE

## 2024-08-22 PROCEDURE — G0378 HOSPITAL OBSERVATION PER HR: HCPCS

## 2024-08-22 PROCEDURE — 80053 COMPREHEN METABOLIC PANEL: CPT | Performed by: EMERGENCY MEDICINE

## 2024-08-22 PROCEDURE — 74177 CT ABD & PELVIS W/CONTRAST: CPT | Mod: MG

## 2024-08-22 PROCEDURE — 83735 ASSAY OF MAGNESIUM: CPT | Performed by: EMERGENCY MEDICINE

## 2024-08-22 PROCEDURE — 96361 HYDRATE IV INFUSION ADD-ON: CPT

## 2024-08-22 PROCEDURE — 36415 COLL VENOUS BLD VENIPUNCTURE: CPT | Performed by: EMERGENCY MEDICINE

## 2024-08-22 PROCEDURE — 83690 ASSAY OF LIPASE: CPT | Performed by: EMERGENCY MEDICINE

## 2024-08-22 RX ORDER — ACETAMINOPHEN 650 MG/1
650 SUPPOSITORY RECTAL EVERY 4 HOURS PRN
Status: DISCONTINUED | OUTPATIENT
Start: 2024-08-22 | End: 2024-08-23 | Stop reason: HOSPADM

## 2024-08-22 RX ORDER — ONDANSETRON 2 MG/ML
4 INJECTION INTRAMUSCULAR; INTRAVENOUS EVERY 6 HOURS PRN
Status: DISCONTINUED | OUTPATIENT
Start: 2024-08-22 | End: 2024-08-23 | Stop reason: HOSPADM

## 2024-08-22 RX ORDER — DICYCLOMINE HYDROCHLORIDE 10 MG/1
20 CAPSULE ORAL ONCE
Status: COMPLETED | OUTPATIENT
Start: 2024-08-22 | End: 2024-08-22

## 2024-08-22 RX ORDER — ACETAMINOPHEN 325 MG/1
650 TABLET ORAL EVERY 4 HOURS PRN
Status: DISCONTINUED | OUTPATIENT
Start: 2024-08-22 | End: 2024-08-23 | Stop reason: HOSPADM

## 2024-08-22 RX ORDER — SODIUM CHLORIDE 9 MG/ML
INJECTION, SOLUTION INTRAVENOUS CONTINUOUS
Status: DISCONTINUED | OUTPATIENT
Start: 2024-08-22 | End: 2024-08-23 | Stop reason: HOSPADM

## 2024-08-22 RX ORDER — FAMOTIDINE 40 MG/1
40 TABLET, FILM COATED ORAL 2 TIMES DAILY
COMMUNITY

## 2024-08-22 RX ORDER — AMOXICILLIN 250 MG
2 CAPSULE ORAL 2 TIMES DAILY PRN
Status: DISCONTINUED | OUTPATIENT
Start: 2024-08-22 | End: 2024-08-23 | Stop reason: HOSPADM

## 2024-08-22 RX ORDER — IOPAMIDOL 755 MG/ML
500 INJECTION, SOLUTION INTRAVASCULAR ONCE
Status: COMPLETED | OUTPATIENT
Start: 2024-08-22 | End: 2024-08-22

## 2024-08-22 RX ORDER — MORPHINE SULFATE 4 MG/ML
4 INJECTION, SOLUTION INTRAMUSCULAR; INTRAVENOUS ONCE
Status: COMPLETED | OUTPATIENT
Start: 2024-08-22 | End: 2024-08-22

## 2024-08-22 RX ORDER — AMOXICILLIN 250 MG
1 CAPSULE ORAL 2 TIMES DAILY PRN
Status: DISCONTINUED | OUTPATIENT
Start: 2024-08-22 | End: 2024-08-23 | Stop reason: HOSPADM

## 2024-08-22 RX ORDER — ONDANSETRON 4 MG/1
4 TABLET, ORALLY DISINTEGRATING ORAL EVERY 6 HOURS PRN
Status: DISCONTINUED | OUTPATIENT
Start: 2024-08-22 | End: 2024-08-23 | Stop reason: HOSPADM

## 2024-08-22 RX ADMIN — SODIUM CHLORIDE 1000 ML: 9 INJECTION, SOLUTION INTRAVENOUS at 18:09

## 2024-08-22 RX ADMIN — MORPHINE SULFATE 4 MG: 4 INJECTION, SOLUTION INTRAMUSCULAR; INTRAVENOUS at 18:09

## 2024-08-22 RX ADMIN — DICYCLOMINE HYDROCHLORIDE 20 MG: 10 CAPSULE ORAL at 20:09

## 2024-08-22 RX ADMIN — SODIUM CHLORIDE 57 ML: 9 INJECTION, SOLUTION INTRAVENOUS at 20:22

## 2024-08-22 RX ADMIN — IOPAMIDOL 68 ML: 755 INJECTION, SOLUTION INTRAVENOUS at 20:22

## 2024-08-22 ASSESSMENT — ACTIVITIES OF DAILY LIVING (ADL)
ADLS_ACUITY_SCORE: 37

## 2024-08-22 ASSESSMENT — COLUMBIA-SUICIDE SEVERITY RATING SCALE - C-SSRS
6. HAVE YOU EVER DONE ANYTHING, STARTED TO DO ANYTHING, OR PREPARED TO DO ANYTHING TO END YOUR LIFE?: NO
1. IN THE PAST MONTH, HAVE YOU WISHED YOU WERE DEAD OR WISHED YOU COULD GO TO SLEEP AND NOT WAKE UP?: NO
2. HAVE YOU ACTUALLY HAD ANY THOUGHTS OF KILLING YOURSELF IN THE PAST MONTH?: NO

## 2024-08-22 NOTE — ED PROVIDER NOTES
Emergency Department Note      History of Present Illness     Chief Complaint   Diarrhea and Nausea      HPI   Lynn Thompson is a 59 year old female presenting with diffuse abdominal pain and diarrhea.  She reports her symptoms have been going off and on for the last month.  She also endorses nausea and vomiting.  No black or bloody stool or emesis.  No fever, chills, chest pain, shortness of breath.  No urinary symptoms.  She endorses a significant amount of watery stools.  She estimates 24 watery stools in the past 24 hours.  No recent antibiotic use.  She has an extensive abdominal surgery history.        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    Sleep apnea     Spasm of sphincter of Oddi     Thyroid nodule 11/01/2016       Medications   calcium carbonate 600 mg-vitamin D 400 units (CALTRATE) 600-400 MG-UNIT per tablet  Continuous Blood Gluc Transmit (DEXCOM G6 TRANSMITTER) MISC  Continuous Glucose Sensor (DEXCOM G6 SENSOR) MISC  DULoxetine (CYMBALTA) 20 MG capsule  escitalopram (LEXAPRO) 20 MG tablet  famotidine (PEPCID) 40 MG tablet  FIASP 100 UNIT/ML SOLN  FIASP PENFILL 100 UNIT/ML SOCT  gabapentin (NEURONTIN) 300 MG capsule  Glucagon (GVOKE HYPOPEN 1-PACK) 1 MG/0.2ML SOAJ  hydrOXYzine (ATARAX) 25 MG tablet  Insulin Disposable Pump (OMNIPOD 5 G6 POD, GEN 5,) MISC  insulin glargine (LANTUS PEN) 100 UNIT/ML pen  INSULIN PUMP - OUTPATIENT  levothyroxine (SYNTHROID/LEVOTHROID) 125 MCG  tablet  lipase-protease-amylase (VIOKACE) 41972-25520-88604 units TABS tablet  loratadine (CLARITIN) 10 MG tablet  omeprazole (PRILOSEC) 40 MG DR capsule  potassium citrate (UROCIT-K) 10 MEQ (1080 MG) CR tablet  traZODone (DESYREL) 50 MG tablet        Surgical History   Past Surgical History:   Procedure Laterality Date    ABDOMINOPLASTY  2002    Tummy tuck    APPENDECTOMY  1990    BUNIONECTOMY Right 1998    CBD Stent placement  2002    CBD stent; Dr. Presley     SECTION      CHOLECYSTECTOMY      COLONOSCOPY N/A 2021    Procedure: COLONOSCOPY INCOMPLETE Aborted due to incomplete prep  will need to take additional prep and return tomorrow 21;  Surgeon: Ihsan Saenz MD;  Location: [facility]    COMBINED CYSTOSCOPY, RETROGRADES, URETEROSCOPY, LASER HOLMIUM LITHOTRIPSY URETER(S), INSERT STENT Right 2015    Procedure: COMBINED CYSTOSCOPY, RETROGRADES, URETEROSCOPY, LASER HOLMIUM LITHOTRIPSY URETER(S), INSERT STENT;  Surgeon: Kennedi Aldana MD;  Location: [facility]    COMBINED CYSTOSCOPY, RETROGRADES, URETEROSCOPY, LASER HOLMIUM LITHOTRIPSY URETER(S), INSERT STENT Right 2015    Procedure: COMBINED CYSTOSCOPY, RETROGRADES, URETEROSCOPY, LASER HOLMIUM LITHOTRIPSY URETER(S), INSERT STENT;  Surgeon: Kennedi Aldana MD;  Location: [facility]    COSMETIC SURGERY  2002    Tummy tuck    CYSTECTOMY OVARIAN BENIGN Right     CYSTOSCOPY, RETROGRADES, INSERT STENT URETER(S), COMBINED  10/02/2012    Procedure: COMBINED CYSTOSCOPY, RETROGRADES, INSERT STENT URETER(S);  COMBINED CYSTOSCOPY,  , INSERT LEFT STENT URETER;  Surgeon: Johny Baez MD;  Location: [facility]    CYSTOSCOPY, RETROGRADES, INSERT STENT URETER(S), COMBINED Left 2022    Procedure: Cystoscopy with left retrograde pyelogram, left ureteroscopy, thulium laser lithotripsy of left ureteral calculus, stone basketing and left ureteral stent insertion;  Surgeon: Alozno Rome MD;  Location: [facility]     ESOPHAGOSCOPY, GASTROSCOPY, DUODENOSCOPY (EGD), COMBINED N/A 01/24/2018    Procedure: COMBINED ESOPHAGOSCOPY, GASTROSCOPY, DUODENOSCOPY (EGD);  ESOPHAGOSCOPY, GASTROSCOPY, DUODENOSCOPY (EGD)    ;  Surgeon: Tamir Rodgers MD;  Location: [facility]    EXTRACORPOREAL SHOCK WAVE LITHOTRIPSY (ESWL)  10/16/2012    Procedure: EXTRACORPOREAL SHOCK WAVE LITHOTRIPSY (ESWL);  left EXTRACORPOREAL SHOCK WAVE LITHOTRIPSY (ESWL) ;  Surgeon: Johny Baez MD;  Location: [facility]    Gastric bypass NOS      HERNIA REPAIR  02/2015    HYSTERECTOMY SUPRACERVICAL, BILATERAL SALPINGO-OOPHORECTOMY, COMBINED N/A 02/01/2022    Procedure: Abdominal supracervical hysterectomy, bilateral salpingooophrectomy;  Surgeon: Nicole Rivera MD;  [facility]: [facility]    IRRIGATION AND DEBRIDEMENT HAND, COMBINED Left 10/30/2020    Procedure: Left hand sharp excisional debridement of skin, subcutaneous tissue and fat with a scalpel, 2 x 1 x 1 cm.;  Surgeon: Demian Renteria MD;  Location: [facility]    LAPAROSCOPIC LYSIS ADHESIONS N/A 02/20/2015    Procedure: LAPAROSCOPIC LYSIS ADHESIONS;  Surgeon: Aaron Early MD;  [facility]    LAPAROSCOPIC LYSIS ADHESIONS N/A 12/29/2015    Procedure: LAPAROSCOPIC LYSIS ADHESIONS;  Surgeon: Aaron Early MD;  [facility]    PANCREATECTOMY, TRANSPLANT AUTO ISLET CELL, COMBINED  05/10/2013    Procedure: COMBINED PANCREATECTOMY, TRANSPLANT AUTO ISLET CELL;  Pancreatectomy, Auto Islet Cell Transplant   hernia repair, jejunostomy tube and liver biopsies with Anesthesia General with block;  Surgeon: Aaron Early MD;  Location: [facility]    Partial ileum resection  1992    RECTOPEXY ABDOMINAL N/A 02/01/2022    Procedure: RECTOPEXY, ABDOMINAL;  Surgeon: Uriah Sheridan MD;  [facility]: [facility]    REPAIR PTOSIS BROW BILATERAL Bilateral 06/09/2020    Procedure: BILATERAL BROW PTOSIS REPAIR;  Surgeon: Dneise Alberts MD;  Location: [facility]    SACROCOLPOPEXY, CYSTOSCOPY, COMBINED N/A 02/01/2022     "Procedure: Uterosacral ligament suspension, pina colposuspension with Cystoscopy;  Surgeon: Nicole Rivera MD;  [facility]    SIGMOIDOSCOPY FLEXIBLE N/A 02/01/2022    Procedure: SIGMOIDOSCOPY, FLEXIBLE;  Surgeon: Uriah Sheridan MD;  [facility]: [facility]    Surgery for SBO  2015    TONSILLECTOMY, ADENOIDECTOMY, COMBINED  1997    TRANSPLANT  5/10/13    Pancreatic Auto-Islet Transplant       Physical Exam     Patient Vitals for the past 24 hrs:   BP Temp Temp src Pulse Resp SpO2 Height Weight   08/22/24 1743 139/77 -- -- 79 -- 98 % -- --   08/22/24 1637 124/65 98.4  F (36.9  C) Temporal 95 18 95 % 1.626 m (5' 4\") 60.9 kg (134 lb 4.2 oz)     Physical Exam  General: No acute distress  Head: No obvious trauma to head.  Ears, Nose, Throat:  External ears normal.  Nose normal.  No pharyngeal erythema, swelling or exudate.  Midline uvula. Moist mucus membranes.  Eyes:  Conjunctivae clear.   Neck: Normal range of motion.  Neck supple.   CV: Regular rate and rhythm.  No murmurs.      Respiratory: Effort normal and breath sounds normal.  No wheezing or crackles.   Gastrointestinal: Soft.  No distension.  Diffuse abdominal tenderness to palpation, worse in the epigastric region, right upper quadrant and suprapubic region.  There is no rigidity, no rebound and no guarding.   Musculoskeletal: Normal range of motion.  Non tender extremities to palpations. No lower extremity edema  Neuro: Alert. Moving all extremities appropriately.  Normal speech.    Skin: Skin is warm and dry.  No rash noted.   Psych: Normal mood and affect. Behavior is normal.       Diagnostics     Lab Results   Labs Ordered and Resulted from Time of ED Arrival to Time of ED Departure   COMPREHENSIVE METABOLIC PANEL - Abnormal       Result Value    Sodium 141      Potassium 4.5      Carbon Dioxide (CO2) 22      Anion Gap 13      Urea Nitrogen 18.6      Creatinine 0.98 (*)     GFR Estimate 65      Calcium 9.6      Chloride 106      Glucose 90   "    Alkaline Phosphatase 123      AST 34      ALT 32      Protein Total 7.0      Albumin 4.1      Bilirubin Total 0.4     MAGNESIUM - Normal    Magnesium 1.7     CBC WITH PLATELETS AND DIFFERENTIAL    WBC Count 9.9      RBC Count 4.49      Hemoglobin 13.2      Hematocrit 41.4      MCV 92      MCH 29.4      MCHC 31.9      RDW 13.8      Platelet Count 336      % Neutrophils 71      % Lymphocytes 16      % Monocytes 10      % Eosinophils 3      % Basophils 1      % Immature Granulocytes 0      NRBCs per 100 WBC 0      Absolute Neutrophils 7.0      Absolute Lymphocytes 1.6      Absolute Monocytes 0.9      Absolute Eosinophils 0.3      Absolute Basophils 0.1      Absolute Immature Granulocytes 0.0      Absolute NRBCs 0.0     C. DIFFICILE TOXIN B PCR WITH REFLEX TO C. DIFFICILE ANTIGEN AND TOXINS A/B EIA   ENTERIC BACTERIA AND VIRUS PANEL BY PCR       Imaging   CT Abdomen Pelvis w Contrast   Final Result   IMPRESSION:       1.  Status post distal gastrectomy, pancreatectomy and splenectomy with gastrojejunostomy, jejunal jejunostomy and limited bowel resection in the right lower quadrant. No mechanical obstruction or acute intra-abdominal inflammatory process.   2.  Stable nonobstructing left lower pole renal calculus.             Independent Interpretation   None    ED Course      Medications Administered   Medications   sodium chloride 0.9% BOLUS 1,000 mL (0 mLs Intravenous Stopped 8/22/24 1936)   morphine (PF) injection 4 mg (4 mg Intravenous $Given 8/22/24 1809)   dicyclomine (BENTYL) capsule 20 mg (20 mg Oral $Given 8/22/24 2009)   CT scan flush (57 mLs Intravenous $Given 8/22/24 2022)   iopamidol (ISOVUE-370) solution 500 mL (68 mLs Intravenous $Given 8/22/24 2022)       Procedures   Procedures     Discussion of Management   Admitting Hospitalist, Dr. Hannon    ED Course   ED Course as of 08/22/24 2145   Thu Aug 22, 2024   1833 Initial evaluation and assessment of patient   2101 I reevaluated the patient        Additional Documentation  None    Medical Decision Making / Diagnosis     CMS Diagnoses: None    MIPS       None    MDM   Lynn Thompson is a 59 year old female presenting with diffuse abdominal pain and diarrhea.  Vital signs are stable.  She does have diffuse abdominal tenderness on exam but is not peritonitic.  No leukocytosis.  CBC, CMP are grossly unremarkable.  He is given IV fluids, morphine and Bentyl.  Enteric panel and C. difficile are ordered, but unfortunately the patient does not have any further episodes of diarrhea in the emergency department.  CT scan shows postsurgical changes but is otherwise unremarkable and is negative for acute intra-abdominal pathology.  I had a discussion with the patient regarding disposition.  She does not feel comfortable discharging home at this time given her pain.  I discussed the patient with the hospitalist, agreed to admit the patient to their service for continued monitoring.  She remains in stable condition.    Disposition   The patient was admitted to the hospital.     Diagnosis     ICD-10-CM    1. Generalized abdominal pain  R10.84       2. Diarrhea, unspecified type  R19.7            Discharge Medications   New Prescriptions    No medications on file         MD Phu Phoenix Stephen, MD  08/22/24 4507

## 2024-08-22 NOTE — ED TRIAGE NOTES
Pt presents to the ED stating she has had intermittent nausea and diarrhea for 1 month. Pt states over the past week the diarrhea has gotten so bad she wears a diaper at night and is incontinent, and she had 24 watery stools in 24 hours. Pt complains of right sided abd pain.

## 2024-08-23 VITALS
TEMPERATURE: 98.4 F | HEIGHT: 64 IN | BODY MASS INDEX: 23.13 KG/M2 | OXYGEN SATURATION: 97 % | SYSTOLIC BLOOD PRESSURE: 131 MMHG | RESPIRATION RATE: 16 BRPM | WEIGHT: 135.5 LBS | DIASTOLIC BLOOD PRESSURE: 67 MMHG | HEART RATE: 63 BPM

## 2024-08-23 DIAGNOSIS — Z90.410 POST-PANCREATECTOMY DIABETES (H): ICD-10-CM

## 2024-08-23 DIAGNOSIS — E13.9 POST-PANCREATECTOMY DIABETES (H): ICD-10-CM

## 2024-08-23 DIAGNOSIS — E89.1 POST-PANCREATECTOMY DIABETES (H): ICD-10-CM

## 2024-08-23 LAB
ANION GAP SERPL CALCULATED.3IONS-SCNC: 6 MMOL/L (ref 7–15)
BASOPHILS # BLD AUTO: 0.1 10E3/UL (ref 0–0.2)
BASOPHILS NFR BLD AUTO: 1 %
BUN SERPL-MCNC: 17 MG/DL (ref 8–23)
CALCIUM SERPL-MCNC: 8.2 MG/DL (ref 8.8–10.4)
CHLORIDE SERPL-SCNC: 109 MMOL/L (ref 98–107)
CREAT SERPL-MCNC: 0.99 MG/DL (ref 0.51–0.95)
EGFRCR SERPLBLD CKD-EPI 2021: 65 ML/MIN/1.73M2
EOSINOPHIL # BLD AUTO: 0.3 10E3/UL (ref 0–0.7)
EOSINOPHIL NFR BLD AUTO: 5 %
ERYTHROCYTE [DISTWIDTH] IN BLOOD BY AUTOMATED COUNT: 14.1 % (ref 10–15)
GLUCOSE BLDC GLUCOMTR-MCNC: 127 MG/DL (ref 70–99)
GLUCOSE BLDC GLUCOMTR-MCNC: 76 MG/DL (ref 70–99)
GLUCOSE BLDC GLUCOMTR-MCNC: 76 MG/DL (ref 70–99)
GLUCOSE SERPL-MCNC: 138 MG/DL (ref 70–99)
HBA1C MFR BLD: 6.6 %
HCO3 SERPL-SCNC: 25 MMOL/L (ref 22–29)
HCT VFR BLD AUTO: 36.3 % (ref 35–47)
HGB BLD-MCNC: 11.3 G/DL (ref 11.7–15.7)
IMM GRANULOCYTES # BLD: 0 10E3/UL
IMM GRANULOCYTES NFR BLD: 0 %
LYMPHOCYTES # BLD AUTO: 2 10E3/UL (ref 0.8–5.3)
LYMPHOCYTES NFR BLD AUTO: 32 %
MCH RBC QN AUTO: 29.3 PG (ref 26.5–33)
MCHC RBC AUTO-ENTMCNC: 31.1 G/DL (ref 31.5–36.5)
MCV RBC AUTO: 94 FL (ref 78–100)
MONOCYTES # BLD AUTO: 0.9 10E3/UL (ref 0–1.3)
MONOCYTES NFR BLD AUTO: 14 %
NEUTROPHILS # BLD AUTO: 3.1 10E3/UL (ref 1.6–8.3)
NEUTROPHILS NFR BLD AUTO: 49 %
NRBC # BLD AUTO: 0 10E3/UL
NRBC BLD AUTO-RTO: 0 /100
PLATELET # BLD AUTO: 318 10E3/UL (ref 150–450)
POTASSIUM SERPL-SCNC: 4.3 MMOL/L (ref 3.4–5.3)
RBC # BLD AUTO: 3.86 10E6/UL (ref 3.8–5.2)
SODIUM SERPL-SCNC: 140 MMOL/L (ref 135–145)
WBC # BLD AUTO: 6.4 10E3/UL (ref 4–11)

## 2024-08-23 PROCEDURE — 85004 AUTOMATED DIFF WBC COUNT: CPT | Performed by: INTERNAL MEDICINE

## 2024-08-23 PROCEDURE — 258N000003 HC RX IP 258 OP 636: Performed by: INTERNAL MEDICINE

## 2024-08-23 PROCEDURE — 80048 BASIC METABOLIC PNL TOTAL CA: CPT | Performed by: INTERNAL MEDICINE

## 2024-08-23 PROCEDURE — 36415 COLL VENOUS BLD VENIPUNCTURE: CPT | Performed by: INTERNAL MEDICINE

## 2024-08-23 PROCEDURE — 83036 HEMOGLOBIN GLYCOSYLATED A1C: CPT | Performed by: NURSE PRACTITIONER

## 2024-08-23 PROCEDURE — 250N000013 HC RX MED GY IP 250 OP 250 PS 637: Performed by: INTERNAL MEDICINE

## 2024-08-23 PROCEDURE — 99239 HOSP IP/OBS DSCHRG MGMT >30: CPT | Performed by: NURSE PRACTITIONER

## 2024-08-23 PROCEDURE — G0378 HOSPITAL OBSERVATION PER HR: HCPCS

## 2024-08-23 PROCEDURE — 82962 GLUCOSE BLOOD TEST: CPT

## 2024-08-23 PROCEDURE — 250N000013 HC RX MED GY IP 250 OP 250 PS 637: Performed by: NURSE PRACTITIONER

## 2024-08-23 RX ORDER — DULOXETIN HYDROCHLORIDE 20 MG/1
20 CAPSULE, DELAYED RELEASE ORAL DAILY
Status: DISCONTINUED | OUTPATIENT
Start: 2024-08-23 | End: 2024-08-23 | Stop reason: HOSPADM

## 2024-08-23 RX ORDER — OXYCODONE HYDROCHLORIDE 5 MG/1
2.5-5 TABLET ORAL EVERY 6 HOURS PRN
Qty: 12 TABLET | Refills: 0 | Status: SHIPPED | OUTPATIENT
Start: 2024-08-23 | End: 2024-08-26

## 2024-08-23 RX ORDER — TRAZODONE HYDROCHLORIDE 100 MG/1
100 TABLET ORAL AT BEDTIME
Status: DISCONTINUED | OUTPATIENT
Start: 2024-08-23 | End: 2024-08-23 | Stop reason: HOSPADM

## 2024-08-23 RX ORDER — NICOTINE POLACRILEX 4 MG
15-30 LOZENGE BUCCAL
Status: DISCONTINUED | OUTPATIENT
Start: 2024-08-23 | End: 2024-08-23 | Stop reason: HOSPADM

## 2024-08-23 RX ORDER — DEXTROSE MONOHYDRATE 25 G/50ML
25-50 INJECTION, SOLUTION INTRAVENOUS
Status: DISCONTINUED | OUTPATIENT
Start: 2024-08-23 | End: 2024-08-23 | Stop reason: HOSPADM

## 2024-08-23 RX ORDER — PANTOPRAZOLE SODIUM 40 MG/1
40 TABLET, DELAYED RELEASE ORAL 2 TIMES DAILY
Status: DISCONTINUED | OUTPATIENT
Start: 2024-08-23 | End: 2024-08-23 | Stop reason: HOSPADM

## 2024-08-23 RX ORDER — INSULIN ASPART INJECTION 100 [IU]/ML
INJECTION, SOLUTION SUBCUTANEOUS
Qty: 90 ML | Refills: 0 | Status: SHIPPED | OUTPATIENT
Start: 2024-08-23

## 2024-08-23 RX ORDER — PROCHLORPERAZINE 25 MG/1
SUPPOSITORY RECTAL
Qty: 1 EACH | Refills: 1 | Status: SHIPPED | OUTPATIENT
Start: 2024-08-23

## 2024-08-23 RX ORDER — POTASSIUM CITRATE 10 MEQ/1
10 TABLET, EXTENDED RELEASE ORAL 2 TIMES DAILY WITH MEALS
Status: DISCONTINUED | OUTPATIENT
Start: 2024-08-23 | End: 2024-08-23 | Stop reason: HOSPADM

## 2024-08-23 RX ORDER — GABAPENTIN 300 MG/1
CAPSULE ORAL
Qty: 90 CAPSULE | Refills: 0 | Status: SHIPPED | OUTPATIENT
Start: 2024-08-23

## 2024-08-23 RX ORDER — ESCITALOPRAM OXALATE 20 MG/1
20 TABLET ORAL DAILY
Status: DISCONTINUED | OUTPATIENT
Start: 2024-08-23 | End: 2024-08-23 | Stop reason: HOSPADM

## 2024-08-23 RX ORDER — ACETAMINOPHEN 325 MG/1
650 TABLET ORAL EVERY 4 HOURS PRN
COMMUNITY
Start: 2024-08-23

## 2024-08-23 RX ORDER — ONDANSETRON 4 MG/1
4 TABLET, ORALLY DISINTEGRATING ORAL EVERY 6 HOURS PRN
Qty: 30 TABLET | Refills: 3 | Status: SHIPPED | OUTPATIENT
Start: 2024-08-23

## 2024-08-23 RX ADMIN — ACETAMINOPHEN 650 MG: 325 TABLET, FILM COATED ORAL at 08:35

## 2024-08-23 RX ADMIN — PANTOPRAZOLE SODIUM 40 MG: 40 TABLET, DELAYED RELEASE ORAL at 08:28

## 2024-08-23 RX ADMIN — ACETAMINOPHEN 650 MG: 325 TABLET, FILM COATED ORAL at 00:08

## 2024-08-23 RX ADMIN — SODIUM CHLORIDE: 9 INJECTION, SOLUTION INTRAVENOUS at 11:09

## 2024-08-23 RX ADMIN — POTASSIUM CITRATE 10 MEQ: 10 TABLET, EXTENDED RELEASE ORAL at 08:54

## 2024-08-23 RX ADMIN — DULOXETINE HYDROCHLORIDE 20 MG: 20 CAPSULE, DELAYED RELEASE ORAL at 08:28

## 2024-08-23 RX ADMIN — ESCITALOPRAM OXALATE 20 MG: 20 TABLET ORAL at 08:28

## 2024-08-23 RX ADMIN — Medication 5 MG: at 00:08

## 2024-08-23 RX ADMIN — ACETAMINOPHEN 650 MG: 325 TABLET, FILM COATED ORAL at 13:03

## 2024-08-23 ASSESSMENT — ACTIVITIES OF DAILY LIVING (ADL)
ADLS_ACUITY_SCORE: 33

## 2024-08-23 NOTE — H&P
History and Physical  Hospitalist       Date of Admission:  8/22/2024  Date of Service (when I saw the patient): 08/22/24    Assessment & Plan   Lynn Thompson is a 59 year old female patient with past medical history of hypertension, diabetes mellitus type 2, obstructive sleep apnea, depression, chronic pancreatitis, status post pancreatectomy and pancreatic islet transplant, history of bypass surgery, chronic kidney stones, hypothyroidism, GERD, presented with abdominal pain and diarrhea.  She stated that she has been having intermittent diarrhea for the past month.  On arrival to emergency room her vital signs showed temperature 98.4, pulse 95, blood pressure 124/65 oxygen saturation 95% on room air.  Laboratory workup showed sodium 141, potassium 4.5, creatinine 0.98, ALT 32, AST 34.  WBC 9.9, hemoglobin 13.2.  Stool for enteric bacterial/viral panel ordered.  CT of the abdomen/pelvis showed status post distal gastrectomy, pancreatectomy, splenectomy with gastrojejunostomy, jejunal jejunostomy and limited bowel resection in the right lower quadrant.  No evidence of mechanical obstruction or acute intra-abdominal inflammatory process.  There is stable nonobstructing left lower pole renal calculus.  In the emergency room, she was given IV fluids.  She was admitted to observation unit.    Diarrhea and abdominal pain suspect infectious gastroenteritis  Patient has prior history of diarrhea requiring admission to this hospital.  She was diagnosed with norovirus and EPEC in March 2024.  She states that her current symptoms are similar to her prior presentation.  Denies fever.  She was given IV fluids in the ER.  Stool for enteric bacteria/virus panel ordered. CT of the abdomen/pelvis showed no evidence of acute intra-abdominal process.  -Will continue supportive treatment with IV fluids, pain medications  -Will put her on enteric precautions    History of chronic pancreatitis,  status post pancreatectomy and pancreatic islet transplant  -Will resume pancreatic enzymes    Diabetes mellitus, on insulin pump  -Will resume PTA insulin regimen once reviewed by pharmacy  -Will monitor blood sugar    Hypothyroidism  -Will resume Synthroid    Depression  -Resume her psych meds    DVT Prophylaxis: Pneumatic Compression Devices  Code Status: Full Code    Disposition: Expected discharge in 1-2 days     Jose Ramon Hannon MD    Primary Care Physician   Maryana Brooks    Chief Complaint   Abdominal pain, watery diarrhea    History is obtained from the patient    History of Present Illness   Lynn Thompson is a 59 year old female patient with past medical history of hypertension, diabetes mellitus type 2, obstructive sleep apnea, depression, chronic pancreatitis, status post pancreatectomy and pancreatic islet transplant, history of bypass surgery, chronic kidney stones, hypothyroidism, GERD, presented with abdominal pain and diarrhea. Patient states that she has been having intermittent diarrhea since beginning of August. She states that she started to have worsening diarrhea yesterday. She had about 20 episodes of watery diarrhea today.  She also associated abdominal pain. She denies fever. She states that she had similar presentation in March for which she was admitted to this hospital.  She denies dysuria, urgency or frequency.  On arrival to emergency room her vital signs showed temperature 98.4, pulse 95, blood pressure 124/65 oxygen saturation 95% on room air.  Laboratory workup showed sodium 141, potassium 4.5, creatinine 0.98, ALT 32, AST 34.  WBC 9.9, hemoglobin 13.2.  Stool for enteric bacterial/viral panel ordered.  CT of the abdomen/pelvis showed status post distal gastrectomy, pancreatectomy, splenectomy with gastrojejunostomy, jejunal jejunostomy and limited bowel resection in the right lower quadrant.  No evidence of mechanical obstruction or acute intra-abdominal inflammatory  process.  There is stable nonobstructing left lower pole renal calculus.  In the emergency room, she was given IV fluids.  She was admitted to observation unit.    Past Medical History    I have reviewed this patient's medical history and updated it with pertinent information if needed.   Past Medical History:   Diagnosis Date    Benign paroxysmal positional vertigo     occ.     Calculus of kidney 05/2005    x1 on L side passed, several stones.  Has been tested for oxalate.    Chronic abdominal pain 2013    Chronic pancreatitis 2013    Depression     also occ panic spells    Diabetes (H) 5/10/2013    Total Pancreatomy with Auto Islets Transplant    Dyspepsia 1999    H. pylori   treated    Dysthymia 2016    Headaches     still periodic HA's ;  often 5X/week    Hypertension 2016    Stress related    Iron deficiency anemia 2003    relates to gastric bypass    Post-pancreatectomy diabetes melltius 2013    Sleep apnea     uses splint    Spasm of sphincter of Oddi     surgical + endoscopic stenting of pancreatic duct @ [facility] 06    Thyroid nodule 2016       Past Surgical History   I have reviewed this patient's surgical history and updated it with pertinent information if needed.  Past Surgical History:   Procedure Laterality Date    ABDOMINOPLASTY  2002    Tummy tuck    APPENDECTOMY  1990    BUNIONECTOMY Right 1998    CBD Stent placement  2002    CBD stent; Dr. Presley     SECTION      CHOLECYSTECTOMY      COLONOSCOPY N/A 2021    Procedure: COLONOSCOPY INCOMPLETE Aborted due to incomplete prep  will need to take additional prep and return tomorrow 21;  Surgeon: Ihsan Saenz MD;  Location:  GI    COMBINED CYSTOSCOPY, RETROGRADES, URETEROSCOPY, LASER HOLMIUM LITHOTRIPSY URETER(S), INSERT STENT Right 2015    Procedure: COMBINED CYSTOSCOPY, RETROGRADES, URETEROSCOPY, LASER HOLMIUM LITHOTRIPSY URETER(S), INSERT STENT;  Surgeon: Katya  Kennedi Bass MD;  Location: [facility]    COMBINED CYSTOSCOPY, RETROGRADES, URETEROSCOPY, LASER HOLMIUM LITHOTRIPSY URETER(S), INSERT STENT Right 04/20/2015    Procedure: COMBINED CYSTOSCOPY, RETROGRADES, URETEROSCOPY, LASER HOLMIUM LITHOTRIPSY URETER(S), INSERT STENT;  Surgeon: Kennedi Aldana MD;  Location: [facility] OR    COSMETIC SURGERY  6/24/2002    Tummy tuck    CYSTECTOMY OVARIAN BENIGN Right     CYSTOSCOPY, RETROGRADES, INSERT STENT URETER(S), COMBINED  10/02/2012    Procedure: COMBINED CYSTOSCOPY, RETROGRADES, INSERT STENT URETER(S);  COMBINED CYSTOSCOPY,  , INSERT LEFT STENT URETER;  Surgeon: Johny Baez MD;  Location: [facility] OR    CYSTOSCOPY, RETROGRADES, INSERT STENT URETER(S), COMBINED Left 9/20/2022    Procedure: Cystoscopy with left retrograde pyelogram, left ureteroscopy, thulium laser lithotripsy of left ureteral calculus, stone basketing and left ureteral stent insertion;  Surgeon: Alonzo Rome MD;  Location: [facility] OR    ESOPHAGOSCOPY, GASTROSCOPY, DUODENOSCOPY (EGD), COMBINED N/A 01/24/2018    Procedure: COMBINED ESOPHAGOSCOPY, GASTROSCOPY, DUODENOSCOPY (EGD);  ESOPHAGOSCOPY, GASTROSCOPY, DUODENOSCOPY (EGD)    ;  Surgeon: Tamir Rodgers MD;  Location:  GI    EXTRACORPOREAL SHOCK WAVE LITHOTRIPSY (ESWL)  10/16/2012    Procedure: EXTRACORPOREAL SHOCK WAVE LITHOTRIPSY (ESWL);  left EXTRACORPOREAL SHOCK WAVE LITHOTRIPSY (ESWL) ;  Surgeon: Johny Baez MD;  Location: [facility] OR    Gastric bypass NOS      HERNIA REPAIR  02/2015    HYSTERECTOMY SUPRACERVICAL, BILATERAL SALPINGO-OOPHORECTOMY, COMBINED N/A 02/01/2022    Procedure: Abdominal supracervical hysterectomy, bilateral salpingooophrectomy;  Surgeon: Nicole Rivera MD;  [facility]    IRRIGATION AND DEBRIDEMENT HAND, COMBINED Left 10/30/2020    Procedure: Left hand sharp excisional debridement of skin, subcutaneous tissue and fat with a scalpel, 2 x 1 x 1 cm.;  Surgeon: Demian Renteria MD;  Location: [facility]     LAPAROSCOPIC LYSIS ADHESIONS N/A 02/20/2015    Procedure: LAPAROSCOPIC LYSIS ADHESIONS;  Surgeon: Aaron Early MD;  [facility]    LAPAROSCOPIC LYSIS ADHESIONS N/A 12/29/2015    Procedure: LAPAROSCOPIC LYSIS ADHESIONS;  Surgeon: Aaron Early MD;  [facility]    PANCREATECTOMY, TRANSPLANT AUTO ISLET CELL, COMBINED  05/10/2013    Procedure: COMBINED PANCREATECTOMY, TRANSPLANT AUTO ISLET CELL;  Pancreatectomy, Auto Islet Cell Transplant   hernia repair, jejunostomy tube and liver biopsies with Anesthesia General with block;  Surgeon: Aaron Early MD;  [facility]    Partial ileum resection  1992    RECTOPEXY ABDOMINAL N/A 02/01/2022    Procedure: RECTOPEXY, ABDOMINAL;  Surgeon: Uriah Sheridan MD;  [facility]    REPAIR PTOSIS BROW BILATERAL Bilateral 06/09/2020    Procedure: BILATERAL BROW PTOSIS REPAIR;  Surgeon: Denise Alberts MD;  Location: [facility]    SACROCOLPOPEXY, CYSTOSCOPY, COMBINED N/A 02/01/2022    Procedure: Uterosacral ligament suspension, pina colposuspension with Cystoscopy;  Surgeon: Nicole Rivera MD;  [facility]    SIGMOIDOSCOPY FLEXIBLE N/A 02/01/2022    Procedure: SIGMOIDOSCOPY, FLEXIBLE;  Surgeon: Uriah Sheridan MD;  [facility]    Surgery for SBO  2015    TONSILLECTOMY, ADENOIDECTOMY, COMBINED  1997    TRANSPLANT  5/10/13    Pancreatic Auto-Islet Transplant       Prior to Admission Medications   Prior to Admission Medications   Prescriptions Last Dose Informant Patient Reported? Taking?   Continuous Blood Gluc Transmit (DEXCOM G6 TRANSMITTER) MISC   No No   Sig: CHANGE EVERY 90 DAYS   Continuous Glucose Sensor (DEXCOM G6 SENSOR) MISC   No No   Sig: CHANGE EVERY 10 DAYS   DULoxetine (CYMBALTA) 20 MG capsule   Yes No   Sig: Take 20 mg by mouth daily   FIASP 100 UNIT/ML SOLN   No No   Sig: Inject 15 units daily via insulin pump. May use up to 85 units daily.   FIASP PENFILL 100 UNIT/ML SOCT   No No   Sig: Inject 0.5-4 Units  Subcutaneous 4 times daily, INJECT with meals and nightly   Patient not taking: Reported on 5/2/2024   Glucagon (GVOKE HYPOPEN 1-PACK) 1 MG/0.2ML SOAJ   No No   Sig: Inject 1 mg Subcutaneous once as needed (for hypoglycemia with loss of consciousness)   Patient not taking: Reported on 5/2/2024   INSULIN PUMP - OUTPATIENT   Yes No   Sig: Inject Subcutaneous continuous Date Last Updated on chart: 3/4/2024  Omnipod 5, type of insulin: Fiasp  Change site every 3 days  Basal Rates in units/hr  5424-1004: 1 unit/hr  ICF (insulin to carb ratio): 12 (1 unit covers 12g carbs)  ISF (insulin sensitivity factor): 140-170 (1 unit lowers BG by 140-170mg/dL)- very sernsitive  Target BG: Upper: 180, lower: 70   Insulin Disposable Pump (OMNIPOD 5 G6 POD, GEN 5,) MISC   No No   Sig: CHANGE EVERY 3 DAYS   calcium carbonate 600 mg-vitamin D 400 units (CALTRATE) 600-400 MG-UNIT per tablet   Yes No   Sig: Take 1 tablet by mouth 2 times daily   escitalopram (LEXAPRO) 20 MG tablet   Yes No   Sig: Take 20 mg by mouth daily   famotidine (PEPCID) 40 MG tablet   No No   Sig: Take 1 tablet (40 mg) by mouth 2 times daily as needed for heartburn   gabapentin (NEURONTIN) 300 MG capsule   Yes No   Sig: Take 300 mg by mouth nightly as needed   hydrOXYzine (ATARAX) 25 MG tablet   Yes No   Sig: Take 25 mg by mouth daily as needed   Patient not taking: Reported on 5/2/2024   insulin glargine (LANTUS PEN) 100 UNIT/ML pen   No No   Sig: Inject 6 units daily in the event of pump failure   Patient not taking: Reported on 5/2/2024   levothyroxine (SYNTHROID/LEVOTHROID) 125 MCG tablet   No No   Sig: Take 1 tablet (125 mcg) by mouth daily   lipase-protease-amylase (VIOKACE) 11204-80528-15232 units TABS tablet   Yes No   Sig: Take 6 with meals and 3 with snacks; approximately daily maximum of 20 capsules   loratadine (CLARITIN) 10 MG tablet  Self Yes No   Sig: Take 10 mg by mouth daily as needed    omeprazole (PRILOSEC) 40 MG DR capsule   No No   Sig: Take 1  capsule (40 mg) by mouth 2 times daily Take 30-60 minutes before a meal.   potassium citrate (UROCIT-K) 10 MEQ (1080 MG) CR tablet   Yes No   Sig: Take 10 mEq by mouth 2 times daily (with meals)   traZODone (DESYREL) 50 MG tablet   Yes No   Sig: Take 50 mg by mouth at bedtime      Facility-Administered Medications: None     Allergies   Allergies   Allergen Reactions    Corticosteroids Other (See Comments)     All oral, IV and injectable steroids are contraindicated (unless in life threatening situations) in Islet Auto transplant recipients. They can cause irreversible loss of islet cell function. Please contact patient's transplant care coordinator, Erlinda Multani RN BSN at [phone]/pager: [phone] and endocrinologist prior to administration.      Povidone Iodine Hives     Causes skin to blister    Nsaids      naprosyn = GI upset    Sulfasalazine Nausea and Nausea and Vomiting       Social History   I have reviewed this patient's social history and updated it with pertinent information if needed. Lynn Thompson  reports that she has never smoked. She has never used smokeless tobacco. She reports current alcohol use. She reports that she does not currently use drugs.    Family History   I have reviewed this patient's family history and updated it with pertinent information if needed.   Family History   Problem Relation Age of Onset    Family History Negative Mother     Respiratory Father         COPD;  at 69    Genitourinary Problems Father         kidney stones    Substance Abuse Father     Depression Father     Asthma Father     Heart Disease Paternal Grandfather         M.I.    Coronary Artery Disease Paternal Grandfather     Hyperlipidemia Paternal Grandfather     Genitourinary Problems Brother         multiple brothers with kidney stones    Gastrointestinal Disease Maternal Grandmother         undiagnosed 'gut' issues    Coronary Artery Disease Maternal Grandfather     Hyperlipidemia Maternal  Grandfather     Cerebrovascular Disease Paternal Grandmother         At the age of 103    Anxiety Disorder Paternal Grandmother     Osteoporosis Paternal Grandmother     Anxiety Disorder Son     Anxiety Disorder Daughter     Asthma Daughter     Colon Cancer No family hx of        Review of Systems   The 10 point Review of Systems is negative other than noted in the HPI or here. Abdominal pain, diarrhea    Physical Exam   Temp: 98.4  F (36.9  C) Temp src: Temporal BP: 139/77 Pulse: 79   Resp: 18 SpO2: 98 % O2 Device: None (Room air)    Vital Signs with Ranges  Temp:  [98.4  F (36.9  C)] 98.4  F (36.9  C)  Pulse:  [79-95] 79  Resp:  [18] 18  BP: (124-139)/(65-77) 139/77  SpO2:  [95 %-98 %] 98 %  134 lbs 4.16 oz    GEN:  Alert, oriented x 3, appears comfortable, NAD.  HEENT:  Normocephalic/atraumatic, no scleral icterus, no nasal discharge, mouth moist.  CV:  Regular rate and rhythm, no murmur or JVD.  S1 + S2 noted, no S3 or S4.  LUNGS:  Clear to auscultation bilaterally without rales/rhonchi/wheezing/retractions.  Symmetric chest rise on inhalation noted.  ABD:  Active bowel sounds, soft, non-tender/non-distended.  No rebound/guarding/rigidity.  EXT:  No edema or cyanosis.  Hands/feet warm to touch with good signs of peripheral perfusion.  No joint synovitis noted.  SKIN:  Dry to touch, no exanthems noted in the visualized areas.  NEURO:  Symmetric muscle strength, sensation to touch grossly intact.  No new focal deficits appreciated.    Data   Data reviewed today:  I personally reviewed no images or EKG's today.  Recent Labs   Lab 08/22/24  1753   WBC 9.9   HGB 13.2   MCV 92         POTASSIUM 4.5   CHLORIDE 106   CO2 22   BUN 18.6   CR 0.98*   ANIONGAP 13   VALARIE 9.6   GLC 90   ALBUMIN 4.1   PROTTOTAL 7.0   BILITOTAL 0.4   ALKPHOS 123   ALT 32   AST 34       Recent Results (from the past 24 hour(s))   CT Abdomen Pelvis w Contrast    Narrative    EXAM: CT ABDOMEN PELVIS W CONTRAST  LOCATION: [facility]  DATE: 8/22/2024    INDICATION: diffuse ab pain, diarrhea  COMPARISON: CT abdomen pelvis with contrast 3/3/2024  TECHNIQUE: CT scan of the abdomen and pelvis was performed following injection of IV contrast. Multiplanar reformats were obtained. Dose reduction techniques were used.  CONTRAST: 68mL Isovue 370    FINDINGS:   LOWER CHEST: Normal.    HEPATOBILIARY: Postcholecystectomy. No bile duct enlargement. Homogeneous liver attenuation. No focal liver lesions.    PANCREAS: Surgically absent    SPLEEN: Surgically absent    ADRENAL GLANDS: Normal.    KIDNEYS/BLADDER: 4 x 7 mm nonobstructing calculus lower pole left kidney. Unchanged cortical thinning in the upper pole left kidney. Mild unchanged urothelial thickening of the left renal pelvis and upper pole calyces. The right kidney is normal in size   with normal cortex thickness. No right nephrolithiasis. The ureters are nondistended. Urinary bladder is normal.    BOWEL: Status post distal gastrectomy and gastrojejunostomy. There is a small bowel anastomosis in the left midabdomen. Surgical anastomotic staple line associated with the proximal ascending colon in the right lower quadrant. No dilated bowel or bowel   wall thickening. No inflammatory stranding in the mesentery. Unchanged presacral fascial thickening.    LYMPH NODES: Unchanged mildly enlarged lymph nodes in the small bowel mesentery. No aortocaval or iliac chain adenopathy.    VASCULATURE: Normal caliber abdominal aorta and iliac arteries.    PELVIC ORGANS: Uterus is normal in size. No pelvic mass.    MUSCULOSKELETAL: Slight anterior wedging of vertebra at the thoracolumbar junction. No aggressive or destructive bone lesions.      Impression    IMPRESSION:     1.  Status post distal gastrectomy, pancreatectomy and splenectomy with gastrojejunostomy, jejunal jejunostomy and limited bowel resection in the right lower quadrant. No mechanical obstruction or acute intra-abdominal  inflammatory process.  2.  Stable nonobstructing left lower pole renal calculus.

## 2024-08-23 NOTE — PLAN OF CARE
"Goal Outcome Evaluation:     PRIMARY DIAGNOSIS: ABDOMINAL PAIN AND DIARRHEA  OUTPATIENT/OBSERVATION GOALS TO BE MET BEFORE DISCHARGE:  ADLs back to baseline: No    Activity and level of assistance: Up with standby assistance.    Pain status: Pain free.    Return to near baseline physical activity: No     Discharge Planner Nurse   Safe discharge environment identified: Yes  Barriers to discharge: Yes       Entered by: Arminda Tim RN 08/23/2024   /58 (BP Location: Right arm)   Pulse 53   Temp 98.1  F (36.7  C) (Oral)   Resp 18   Ht 1.626 m (5' 4\")   Wt 61.5 kg (135 lb 8 oz)   LMP 12/19/2013   SpO2 98%   BMI 23.26 kg/m     Alert & oriented. VSS. Denies pain. NS infusing. Bgs checks ACHS.On enteric precautions.Not yet able to produce a stool sample. Last diarrhea episode was early yesterday.     Please review provider order for any additional goals.   Nurse to notify provider when observation goals have been met and patient is ready for discharge.                 "

## 2024-08-23 NOTE — PLAN OF CARE
ROOM # 221    Living Situation (if not independent, order SW consult):  Facility name:  : [facility]( daughter)    Activity level at baseline: Independent  Activity level on admit: SBA    Who will be transporting you at discharge: Family    Patient registered to observation; given Patient Bill of Rights; given the opportunity to ask questions about observation status and their plan of care.  Patient has been oriented to the observation room, bathroom and call light is in place.    Discussed discharge goals and expectations with patient/family.

## 2024-08-23 NOTE — PROGRESS NOTES
Patient's After Visit Summary was reviewed with patient and/or spouse.   Patient verbalized understanding of After Visit Summary, recommended follow up and was given an opportunity to ask questions.   Discharge medications sent home with patient/family: Not applicable   Discharged with significant other        Pt. appropriate for DC to home today. in room with pt. RN reviewed DC orders with pt. and . All questions answered at that time.Pt. stated understanding of orders and plan.PIV was Dc'd, and pt. got dressed and was wheeled to front of hospital and safely deposited into waiting car.Pt. left hospital.

## 2024-08-23 NOTE — PLAN OF CARE
PRIMARY DIAGNOSIS: DIARRHEA  OUTPATIENT/OBSERVATION GOALS TO BE MET BEFORE DISCHARGE:  1. Pain Status: Improved-controlled with oral pain medications.    2. Return to near baseline physical activity: Yes    3. Cleared for discharge by consultants (if involved): No    Discharge Planner Nurse   Safe discharge environment identified: No  Barriers to discharge: Yes       Entered by: Zoey Sinclair RN 08/23/2024 12:02 PM     VSS, afeb this AM.Lungs clear, + BS x 4 quads.States she still is having some abdominal pain when eating, but is improved to a 3 from a 5 after Tylenol. Has personal insulin pump in place and is regulating dosing independently.Basal @ 1u/hr, and is titrating boluses according to carbs.NS @ 100cc/hr.No diarrhea or any stools thus far.resting comfortably between cares.Will con't to monitor BG's and pain.    Please review provider order for any additional goals.   Nurse to notify provider when observation goals have been met and patient is ready for discharge.Goal Outcome Evaluation:      Plan of Care Reviewed With: patient    Overall Patient Progress: improvingOverall Patient Progress: improving

## 2024-08-23 NOTE — DISCHARGE SUMMARY
[facility]  Hospitalist Discharge Summary      Date of Admission:  8/22/2024  Date of Discharge:  8/23/2024   Discharging Provider: MITZY Bahena CNP  Discharge Service: Hospitalist Service    Discharge Diagnoses   See below    Clinically Significant Risk Factors          Follow-ups Needed After Discharge   Follow-up Appointments     Follow-up and recommended labs and tests       Follow up with primary care provider, Maryana Brooks, within 7 days   for hospital follow- up.  No follow up labs or test are needed.            Unresulted Labs Ordered in the Past 30 Days of this Admission       No orders found for last 31 day(s).        These results will be followed up by NA    Discharge Disposition   Discharged to home  Condition at discharge: Stable    Hospital Course   Lynn Thompson is a 59 year old female patient with past medical history of hypertension, diabetes mellitus type 2, obstructive sleep apnea, depression, chronic pancreatitis, status post pancreatectomy and pancreatic islet transplant, history of bypass surgery, chronic kidney stones, hypothyroidism, GERD, presented with abdominal pain and diarrhea.  She stated that she has been having intermittent diarrhea for the past month.  On arrival to emergency room her vital signs showed temperature 98.4, pulse 95, blood pressure 124/65 oxygen saturation 95% on room air.  Laboratory workup showed sodium 141, potassium 4.5, creatinine 0.98, ALT 32, AST 34.  WBC 9.9, hemoglobin 13.2.  Stool for enteric bacterial/viral panel ordered.  CT of the abdomen/pelvis showed status post distal gastrectomy, pancreatectomy, splenectomy with gastrojejunostomy, jejunal jejunostomy and limited bowel resection in the right lower quadrant.  No evidence of mechanical obstruction or acute intra-abdominal inflammatory process.  There is stable nonobstructing left lower pole renal calculus. In the emergency room, she was given IV fluids.  She  was admitted to observation unit.     Diarrhea and abdominal pain suspect infectious gastroenteritis  Patient has prior history of diarrhea requiring admission to this hospital.  She was diagnosed with norovirus and EPEC in March 2024.  She states that her current symptoms are similar to her prior presentation.  Denies fever.  She was given IV fluids in the ER.  Stool for enteric bacteria/virus panel ordered. CT of the abdomen/pelvis showed no evidence of acute intra-abdominal process.  Received IVF and APAP.  Did receive one dose of morphine.  Since arrival to the inpatient setting, has not had any further bowel movements.  Day of discharge was tolerating PO intake and vital signs were stable.      Discharge pain management plan:  -- Hydration  -- Increase gabapentin to 300 mg BID (was every day).  Can increase to TID if needed.  -- Oxycodone 2.5-5 mg PRN breakthrough pain.   -- Follow up with PCP/Pain Management MD as needed.        History of chronic pancreatitis, status post pancreatectomy and pancreatic islet transplant  Continue pancreatic enzymes.      Diabetes mellitus, on insulin pump. A1C updated and was 6.6 (prior 6.7-7.1).  Continued insulin pump.  FSG stable while in the hospital --  .     Hypothyroidism Continue Synthroid     Depression Continue duloxetine, escitalopram, and trazodone.     Consultations This Hospital Stay   None    Code Status   Full Code    Time Spent on this Encounter   I, MITZY Bahena CNP, personally saw the patient today and spent greater than 30 minutes discharging this patient.       MITZY Bahena CNP  [facility] OBSERVATION DEPT  [facility] [place] [address] [place] [address]  Phone: [phone]  ______________________________________________________________________    Physical Exam   Vital Signs: Temp: 98.4  F (36.9  C) Temp src: Oral BP: 131/67 Pulse: 63   Resp: 16 SpO2: 97 % O2 Device: None (Room air)    Weight: 135 lbs 8 oz  GEN:    Alert, oriented x 3, appears comfortable, NAD.  NECK:   Supple ,no mass or thyromegaly   HEENT:  Normocephalic/atraumatic, no scleral icterus, no nasal discharge, mouth moist.  CV:   Regular rate and rhythm, no murmur or JVD.  S1 + S2 noted, no S3 or S4.  LUNGS:   Clear to auscultation bilaterally without rales/rhonchi/wheezing/retractions.  Symmetric chest rise on inhalation noted.  ABD:   Active bowel sounds, soft, slight tenderness/non-distended.  Well healed surgical scars.  No rebound/guarding/rigidity. No CVA tenderness.  EXT:   No edema.  No cyanosis.  No joint synovitis noted.  SKIN:   Dry to touch, no exanthems noted in the visualized areas.  Neurologic: Grossly intact,non focal.   Neuropsychiatric:  General: normal, calm and normal eye contact  Level of consciousness: alert / normal  Affect: normal  Orientation: oriented to self, place, time and situation        Primary Care Physician   Maryana Brooks    Discharge Orders      Reason for your hospital stay    Acute on chronic abdominal pain  Diarrhea  S/P auto islet transplant.  Hx of pancreatectomy  T1DM secondary to above     Follow-up and recommended labs and tests     Follow up with primary care provider, Maryana Brooks, within 7 days for hospital follow- up.  No follow up labs or test are needed.     Activity    Your activity upon discharge: activity as tolerated and no driving while on analgesics     Discharge Instructions    Interim pain management plan:  -- Make sure that you are staying hydrated.  -- Increase gabapentin to 300 mg 1 cap 2 x a day.  If still having pain can increase to 3 x a day.  -- Acetaminophen (Tylenol) for pain control.  No more than 3 grams of acetaminophen from all sources daily.  -- Oxycodone 2.5 mg to 5 mg as needed for breakthrough pain.     Diet    Follow this diet upon discharge: Current Diet:Orders Placed This Encounter      Regular Diet Adult    Make sure that you are staying hydrated.  Goal is to drink  between 60-90 ounces of non carbonated, non caffeinated beverages daily.       Significant Results and Procedures   Most Recent 3 CBC's:  Recent Labs   Lab Test 08/23/24  0708 08/22/24  1753 05/13/24  1458   WBC 6.4 9.9 5.7   HGB 11.3* 13.2 12.1   MCV 94 92 94    336 327     Most Recent 3 BMP's:  Recent Labs   Lab Test 08/23/24  1208 08/23/24  1034 08/23/24  0708 08/23/24  0348 08/22/24  1753 04/24/24  0946   NA  --   --  140  --  141 140   POTASSIUM  --   --  4.3  --  4.5 4.1   CHLORIDE  --   --  109*  --  106 106   CO2  --   --  25  --  22 25   BUN  --   --  17.0  --  18.6 19.8   CR  --   --  0.99*  --  0.98* 0.95   ANIONGAP  --   --  6*  --  13 9   VALARIE  --   --  8.2*  --  9.6 9.2   GLC 76 127* 138*   < > 90 159*    < > = values in this interval not displayed.     Most Recent 2 LFT's:  Recent Labs   Lab Test 08/22/24 1753 04/24/24  0946   AST 34 27   ALT 32 27   ALKPHOS 123 96   BILITOTAL 0.4 0.5       Most Recent Hemoglobin A1c:  Recent Labs   Lab Test 08/23/24  0708   A1C 6.6*     Most Recent 6 glucoses:  Recent Labs   Lab Test 08/23/24  1208 08/23/24  1034 08/23/24  0708 08/23/24  0348 08/22/24  1753 04/24/24  0946   GLC 76 127* 138* 76 90 159*     Most Recent Urinalysis:  Recent Labs   Lab Test 03/03/24  2146 06/30/23  1944 12/13/22  0759   COLOR Light Yellow   < > Yellow   APPEARANCE Clear   < > Clear   URINEGLC 100*   < > Negative   URINEBILI Negative   < > Negative   URINEKETONE Negative   < > Negative   SG 1.008   < > >=1.030   UBLD Moderate*   < > Negative   URINEPH 5.5   < > 6.0   PROTEIN Negative   < > Negative   UROBILINOGEN  --   --  0.2   NITRITE Negative   < > Negative   LEUKEST Negative   < > Small*   RBCU 26*   < >  --    WBCU 1   < >  --     < > = values in this interval not displayed.   ,   Results for orders placed or performed during the hospital encounter of 08/22/24   CT Abdomen Pelvis w Contrast    Narrative    EXAM: CT ABDOMEN PELVIS W CONTRAST  LOCATION: [facility]  DATE: 8/22/2024    INDICATION: diffuse ab pain, diarrhea  COMPARISON: CT abdomen pelvis with contrast 3/3/2024  TECHNIQUE: CT scan of the abdomen and pelvis was performed following injection of IV contrast. Multiplanar reformats were obtained. Dose reduction techniques were used.  CONTRAST: 68mL Isovue 370    FINDINGS:   LOWER CHEST: Normal.    HEPATOBILIARY: Postcholecystectomy. No bile duct enlargement. Homogeneous liver attenuation. No focal liver lesions.    PANCREAS: Surgically absent    SPLEEN: Surgically absent    ADRENAL GLANDS: Normal.    KIDNEYS/BLADDER: 4 x 7 mm nonobstructing calculus lower pole left kidney. Unchanged cortical thinning in the upper pole left kidney. Mild unchanged urothelial thickening of the left renal pelvis and upper pole calyces. The right kidney is normal in size   with normal cortex thickness. No right nephrolithiasis. The ureters are nondistended. Urinary bladder is normal.    BOWEL: Status post distal gastrectomy and gastrojejunostomy. There is a small bowel anastomosis in the left midabdomen. Surgical anastomotic staple line associated with the proximal ascending colon in the right lower quadrant. No dilated bowel or bowel   wall thickening. No inflammatory stranding in the mesentery. Unchanged presacral fascial thickening.    LYMPH NODES: Unchanged mildly enlarged lymph nodes in the small bowel mesentery. No aortocaval or iliac chain adenopathy.    VASCULATURE: Normal caliber abdominal aorta and iliac arteries.    PELVIC ORGANS: Uterus is normal in size. No pelvic mass.    MUSCULOSKELETAL: Slight anterior wedging of vertebra at the thoracolumbar junction. No aggressive or destructive bone lesions.      Impression    IMPRESSION:     1.  Status post distal gastrectomy, pancreatectomy and splenectomy with gastrojejunostomy, jejunal jejunostomy and limited bowel resection in the right lower quadrant. No mechanical obstruction or acute intra-abdominal inflammatory  process.  2.  Stable nonobstructing left lower pole renal calculus.       *Note: Due to a large number of results and/or encounters for the requested time period, some results have not been displayed. A complete set of results can be found in Results Review.       Discharge Medications   Current Discharge Medication List        START taking these medications    Details   acetaminophen (TYLENOL) 325 MG tablet Take 2 tablets (650 mg) by mouth every 4 hours as needed for mild pain or other (and adjunct with moderate or severe pain or per patient request).    Associated Diagnoses: Generalized abdominal pain      ondansetron (ZOFRAN ODT) 4 MG ODT tab Take 1 tablet (4 mg) by mouth every 6 hours as needed for nausea or vomiting.  Qty: 30 tablet, Refills: 3    Associated Diagnoses: Generalized abdominal pain      oxyCODONE (ROXICODONE) 5 MG tablet Take 0.5-1 tablets (2.5-5 mg) by mouth every 6 hours as needed for pain (pain 5-6/10 = 2.5 mg and pain 7-10/10 = 5 mg.  Use for breakthrough pain.).  Qty: 12 tablet, Refills: 0    Associated Diagnoses: Generalized abdominal pain           CONTINUE these medications which have CHANGED    Details   gabapentin (NEURONTIN) 300 MG capsule 1 capsule BID.  If still having pain can increase to 1 capsule TID.  Qty: 90 capsule, Refills: 0    Associated Diagnoses: Generalized abdominal pain           CONTINUE these medications which have NOT CHANGED    Details   !! calcium carbonate 600 mg-vitamin D 400 units (CALTRATE) 600-400 MG-UNIT per tablet Take 1 tablet by mouth every morning.      !! Calcium Carbonate-Vitamin D (CALTRATE 600+D PO) Take 2 tablets by mouth every evening.      DULoxetine (CYMBALTA) 20 MG capsule Take 20 mg by mouth daily      escitalopram (LEXAPRO) 20 MG tablet Take 20 mg by mouth daily      famotidine (PEPCID) 40 MG tablet Take 40 mg by mouth 2 times daily.      FIASP PENFILL 100 UNIT/ML SOCT Inject 0.5-4 Units Subcutaneous 4 times daily, INJECT with meals and  nightly  Qty: 15 mL, Refills: 0    Associated Diagnoses: Post-pancreatectomy diabetes (H)      Glucagon (GVOKE HYPOPEN 1-PACK) 1 MG/0.2ML SOAJ Inject 1 mg Subcutaneous once as needed (for hypoglycemia with loss of consciousness)  Qty: 15 mL, Refills: 5    Associated Diagnoses: Post-pancreatectomy diabetes (H)      hydrOXYzine (ATARAX) 25 MG tablet Take 25 mg by mouth daily as needed.  Refills: 0      Insulin Disposable Pump (OMNIPOD 5 G6 POD, GEN 5,) MISC CHANGE EVERY 3 DAYS  Qty: 30 each, Refills: 5    Associated Diagnoses: Post-pancreatectomy diabetes (H)      insulin glargine (LANTUS PEN) 100 UNIT/ML pen Inject 6 units daily in the event of pump failure  Qty: 15 mL, Refills: 0    Comments: If Lantus is not covered by insurance, may substitute Basaglar or Semglee or other insulin glargine product per insurance preference at same dose and frequency.    Associated Diagnoses: Post-pancreatectomy diabetes (H)      INSULIN PUMP - OUTPATIENT Inject Subcutaneous continuous Date Last Updated on chart: 3/4/2024  Omnipod 5, type of insulin: Fiasp  Change site every 3 days  Basal Rates in units/hr  1970-4476: 1 unit/hr  ICF (insulin to carb ratio): 12 (1 unit covers 12g carbs)  ISF (insulin sensitivity factor): 140-170 (1 unit lowers BG by 140-170mg/dL)- very sernsitive  Target BG: Upper: 180, lower: 70      levothyroxine (SYNTHROID/LEVOTHROID) 125 MCG tablet Take 1 tablet (125 mcg) by mouth daily  Qty: 90 tablet, Refills: 3    Associated Diagnoses: Acquired hypothyroidism      lipase-protease-amylase (VIOKACE) 89376-[address]-77711 units TABS tablet Take 6 with meals and 3 with snacks; approximately daily maximum of 20 capsules    Comments: PLEASE SEND PRIOR AUTHORIZATION REQUESTS TO [facility] TRANSPLANT COORDINATOR: [phone]  Associated Diagnoses: Pancreatic insufficiency      loratadine (CLARITIN) 10 MG tablet Take 10 mg by mouth daily as needed       omeprazole (PRILOSEC) 40 MG DR capsule Take 1 capsule (40 mg) by mouth 2 times  daily Take 30-60 minutes before a meal.  Qty: 180 capsule, Refills: 3    Associated Diagnoses: Gastroesophageal reflux disease without esophagitis      potassium citrate (UROCIT-K) 10 MEQ (1080 MG) CR tablet Take 10 mEq by mouth 2 times daily (with meals)      traZODone (DESYREL) 50 MG tablet Take 100 mg by mouth at bedtime.      Continuous Glucose Sensor (DEXCOM G6 SENSOR) MISC CHANGE EVERY 10 DAYS  Qty: 3 each, Refills: 5    Associated Diagnoses: Post-pancreatectomy diabetes (H)      Continuous Glucose Transmitter (DEXCOM G6 TRANSMITTER) MISC CHANGE EVERY 90 DAYS  Qty: 1 each, Refills: 1    Associated Diagnoses: Post-pancreatectomy diabetes (H)      FIASP 100 UNIT/ML SOLN INJECT 15 UNITS UNDER THE SKIN VIA INSULIN PUMP. MAY USE UP TO 85 UNITS DAILY  Qty: 90 mL, Refills: 0    Associated Diagnoses: Post-pancreatectomy diabetes (H)       !! - Potential duplicate medications found. Please discuss with provider.        Allergies   Allergies   Allergen Reactions    Corticosteroids Other (See Comments)     All oral, IV and injectable steroids are contraindicated (unless in life threatening situations) in Islet Auto transplant recipients. They can cause irreversible loss of islet cell function. Please contact patient's transplant care coordinator, Erlinda Multani RN BSN at [phone]/pager: [phone] and endocrinologist prior to administration.      Povidone Iodine Hives     Causes skin to blister    Nsaids      naprosyn = GI upset    Sulfasalazine Nausea and Nausea and Vomiting

## 2024-08-23 NOTE — PHARMACY-ADMISSION MEDICATION HISTORY
Pharmacist Admission Medication History    Admission medication history is complete. The information provided in this note is only as accurate as the sources available at the time of the update.    Information Source(s): Patient via in-person    Pertinent Information: outside meds, no changes in the insulin pump setting since 3-4-24 per patient    Changes made to PTA medication list:  Added: none  Deleted: none  Changed: calcium with D, pepcid, gabapentin, and trazodone    Allergies reviewed with patient and updates made in EHR: yes    Medication History Completed By: Mo Hines RPH 8/22/2024 10:29 PM    PTA Med List   Medication Sig Last Dose    calcium carbonate 600 mg-vitamin D 400 units (CALTRATE) 600-400 MG-UNIT per tablet Take 1 tablet by mouth every morning. 8/22/2024 at am    Calcium Carbonate-Vitamin D (CALTRATE 600+D PO) Take 2 tablets by mouth every evening. 8/21/2024 at pm    DULoxetine (CYMBALTA) 20 MG capsule Take 20 mg by mouth daily 8/22/2024 at am    escitalopram (LEXAPRO) 20 MG tablet Take 20 mg by mouth daily 8/22/2024 at am    famotidine (PEPCID) 40 MG tablet Take 40 mg by mouth 2 times daily. 8/22/2024 at am    FIASP 100 UNIT/ML SOLN Inject 15 units daily via insulin pump. May use up to 85 units daily. active    FIASP PENFILL 100 UNIT/ML SOCT Inject 0.5-4 Units Subcutaneous 4 times daily, INJECT with meals and nightly adds dose via Pump    gabapentin (NEURONTIN) 300 MG capsule Take 300 mg by mouth at bedtime. 8/21/2024 at hs    Glucagon (GVOKE HYPOPEN 1-PACK) 1 MG/0.2ML SOAJ Inject 1 mg Subcutaneous once as needed (for hypoglycemia with loss of consciousness)     hydrOXYzine (ATARAX) 25 MG tablet Take 25 mg by mouth daily as needed. Unknown    Insulin Disposable Pump (OMNIPOD 5 G6 POD, GEN 5,) MISC CHANGE EVERY 3 DAYS active    insulin glargine (LANTUS PEN) 100 UNIT/ML pen Inject 6 units daily in the event of pump failure prn    INSULIN PUMP - OUTPATIENT Inject Subcutaneous continuous Date Last  Updated on chart: 3/4/2024  Omnipod 5, type of insulin: Fiasp  Change site every 3 days  Basal Rates in units/hr  9678-8715: 1 unit/hr  ICF (insulin to carb ratio): 12 (1 unit covers 12g carbs)  ISF (insulin sensitivity factor): 140-170 (1 unit lowers BG by 140-170mg/dL)- very sernsitive  Target BG: Upper: 180, lower: 70 active    levothyroxine (SYNTHROID/LEVOTHROID) 125 MCG tablet Take 1 tablet (125 mcg) by mouth daily 8/22/2024 at am    lipase-protease-amylase (VIOKACE) [id number] units TABS tablet Take 6 with meals and 3 with snacks; approximately daily maximum of 20 capsules 8/22/2024 at am    loratadine (CLARITIN) 10 MG tablet Take 10 mg by mouth daily as needed  8/22/2024 at am    omeprazole (PRILOSEC) 40 MG DR capsule Take 1 capsule (40 mg) by mouth 2 times daily Take 30-60 minutes before a meal. 8/22/2024 at am    potassium citrate (UROCIT-K) 10 MEQ (1080 MG) CR tablet Take 10 mEq by mouth 2 times daily (with meals) 8/22/2024 at am    traZODone (DESYREL) 50 MG tablet Take 100 mg by mouth at bedtime. 8/21/2024 at hs

## 2024-08-23 NOTE — PROGRESS NOTES
"PRIMARY DIAGNOSIS: ABDOMINAL PAIN/DIARRHEA  OUTPATIENT/OBSERVATION GOALS TO BE MET BEFORE DISCHARGE:  1. Pain Status: Improved-controlled with oral pain medications.    2. Return to near baseline physical activity: Yes    3. Cleared for discharge by consultants (if involved): No    Discharge Planner Nurse   Safe discharge environment identified: No  Barriers to discharge: Yes       Entered by: Zoey Sinclair RN 08/23/2024 1:09 PM   VS remain stable.BG pre lunch: 76.Pt. eating and gave self 1.5u bolus for 15 unit(s) CHO.Ratinbg pain a \"5 \" this afternoon and received Tylenol PO.Resting comfortably in bed.Family @ bedside.    Please review provider order for any additional goals.   Nurse to notify provider when observation goals have been met and patient is ready for discharge.  "

## 2024-08-26 ENCOUNTER — MYC REFILL (OUTPATIENT)
Dept: FAMILY MEDICINE | Facility: CLINIC | Age: 59
End: 2024-08-26
Payer: COMMERCIAL

## 2024-08-26 DIAGNOSIS — K21.9 GASTROESOPHAGEAL REFLUX DISEASE WITHOUT ESOPHAGITIS: ICD-10-CM

## 2024-08-26 RX ORDER — OMEPRAZOLE 40 MG/1
40 CAPSULE, DELAYED RELEASE ORAL 2 TIMES DAILY
Qty: 180 CAPSULE | Refills: 3 | OUTPATIENT
Start: 2024-08-26

## 2024-09-12 DIAGNOSIS — Z90.410 POST-PANCREATECTOMY DIABETES (H): Primary | ICD-10-CM

## 2024-09-12 DIAGNOSIS — K86.81 EXOCRINE PANCREATIC INSUFFICIENCY: ICD-10-CM

## 2024-09-12 DIAGNOSIS — K90.9 INTESTINAL MALABSORPTION, UNSPECIFIED: ICD-10-CM

## 2024-09-12 DIAGNOSIS — E89.1 POST-PANCREATECTOMY DIABETES (H): Primary | ICD-10-CM

## 2024-09-12 DIAGNOSIS — Z90.410 ACQUIRED ABSENCE OF PANCREAS: ICD-10-CM

## 2024-09-12 DIAGNOSIS — E13.9 POST-PANCREATECTOMY DIABETES (H): Primary | ICD-10-CM

## 2024-10-04 DIAGNOSIS — E89.1 POST-PANCREATECTOMY DIABETES (H): Primary | ICD-10-CM

## 2024-10-04 DIAGNOSIS — Z90.410 ACQUIRED ABSENCE OF PANCREAS: ICD-10-CM

## 2024-10-04 DIAGNOSIS — Z90.410 POST-PANCREATECTOMY DIABETES (H): Primary | ICD-10-CM

## 2024-10-04 DIAGNOSIS — E13.9 POST-PANCREATECTOMY DIABETES (H): Primary | ICD-10-CM

## 2024-10-11 ENCOUNTER — LAB (OUTPATIENT)
Dept: ONCOLOGY | Facility: CLINIC | Age: 59
End: 2024-10-11
Attending: INTERNAL MEDICINE
Payer: COMMERCIAL

## 2024-10-11 DIAGNOSIS — D50.9 IRON DEFICIENCY ANEMIA, UNSPECIFIED IRON DEFICIENCY ANEMIA TYPE: ICD-10-CM

## 2024-10-11 LAB
BASOPHILS # BLD AUTO: 0.1 10E3/UL (ref 0–0.2)
BASOPHILS NFR BLD AUTO: 2 %
EOSINOPHIL # BLD AUTO: 0.2 10E3/UL (ref 0–0.7)
EOSINOPHIL NFR BLD AUTO: 3 %
ERYTHROCYTE [DISTWIDTH] IN BLOOD BY AUTOMATED COUNT: 14 % (ref 10–15)
FERRITIN SERPL-MCNC: 147 NG/ML (ref 11–328)
HCT VFR BLD AUTO: 39.2 % (ref 35–47)
HGB BLD-MCNC: 12.6 G/DL (ref 11.7–15.7)
IMM GRANULOCYTES # BLD: 0 10E3/UL
IMM GRANULOCYTES NFR BLD: 0 %
IRON BINDING CAPACITY (ROCHE): 261 UG/DL (ref 240–430)
IRON SATN MFR SERPL: 43 % (ref 15–46)
IRON SERPL-MCNC: 111 UG/DL (ref 37–145)
LYMPHOCYTES # BLD AUTO: 1.6 10E3/UL (ref 0.8–5.3)
LYMPHOCYTES NFR BLD AUTO: 27 %
MCH RBC QN AUTO: 29.7 PG (ref 26.5–33)
MCHC RBC AUTO-ENTMCNC: 32.1 G/DL (ref 31.5–36.5)
MCV RBC AUTO: 93 FL (ref 78–100)
MONOCYTES # BLD AUTO: 0.6 10E3/UL (ref 0–1.3)
MONOCYTES NFR BLD AUTO: 10 %
NEUTROPHILS # BLD AUTO: 3.6 10E3/UL (ref 1.6–8.3)
NEUTROPHILS NFR BLD AUTO: 59 %
NRBC # BLD AUTO: 0 10E3/UL
NRBC BLD AUTO-RTO: 0 /100
PLATELET # BLD AUTO: 320 10E3/UL (ref 150–450)
RBC # BLD AUTO: 4.24 10E6/UL (ref 3.8–5.2)
WBC # BLD AUTO: 6.2 10E3/UL (ref 4–11)

## 2024-10-11 PROCEDURE — 83550 IRON BINDING TEST: CPT | Performed by: INTERNAL MEDICINE

## 2024-10-11 PROCEDURE — 85004 AUTOMATED DIFF WBC COUNT: CPT | Performed by: INTERNAL MEDICINE

## 2024-10-11 PROCEDURE — 82728 ASSAY OF FERRITIN: CPT | Performed by: INTERNAL MEDICINE

## 2024-10-11 PROCEDURE — 36415 COLL VENOUS BLD VENIPUNCTURE: CPT

## 2024-10-16 ENCOUNTER — TRANSFERRED RECORDS (OUTPATIENT)
Dept: HEALTH INFORMATION MANAGEMENT | Facility: CLINIC | Age: 59
End: 2024-10-16
Payer: COMMERCIAL

## 2024-10-31 ENCOUNTER — APPOINTMENT (OUTPATIENT)
Dept: GENERAL RADIOLOGY | Facility: CLINIC | Age: 59
End: 2024-10-31
Attending: PHYSICIAN ASSISTANT
Payer: COMMERCIAL

## 2024-10-31 ENCOUNTER — HOSPITAL ENCOUNTER (EMERGENCY)
Facility: CLINIC | Age: 59
Discharge: HOME | End: 2024-10-31
Attending: PHYSICIAN ASSISTANT | Admitting: PHYSICIAN ASSISTANT
Payer: COMMERCIAL

## 2024-10-31 VITALS
TEMPERATURE: 97.5 F | BODY MASS INDEX: 23.05 KG/M2 | HEART RATE: 69 BPM | DIASTOLIC BLOOD PRESSURE: 74 MMHG | SYSTOLIC BLOOD PRESSURE: 140 MMHG | HEIGHT: 64 IN | WEIGHT: 135 LBS | RESPIRATION RATE: 16 BRPM | OXYGEN SATURATION: 99 %

## 2024-10-31 DIAGNOSIS — Q89.01 ASPLENIA: ICD-10-CM

## 2024-10-31 DIAGNOSIS — S61.258A DOG BITE OF INDEX FINGER, INITIAL ENCOUNTER: ICD-10-CM
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
## 2024-10-31 PROCEDURE — 73140 X-RAY EXAM OF FINGER(S): CPT | Mod: LT

## 2024-10-31 PROCEDURE — 99284 EMERGENCY DEPT VISIT MOD MDM: CPT | Mod: 25

## 2024-10-31 PROCEDURE — 250N000011 HC RX IP 250 OP 636: Performed by: PHYSICIAN ASSISTANT

## 2024-10-31 PROCEDURE — 96374 THER/PROPH/DIAG INJ IV PUSH: CPT

## 2024-10-31 RX ORDER — FLUCONAZOLE 150 MG/1
TABLET ORAL
Qty: 2 TABLET | Refills: 0 | Status: ON HOLD | OUTPATIENT
Start: 2024-10-31 | End: 2024-11-05

## 2024-10-31 RX ORDER — AMPICILLIN AND SULBACTAM 2; 1 G/1; G/1
3 INJECTION, POWDER, FOR SOLUTION INTRAMUSCULAR; INTRAVENOUS ONCE
Status: COMPLETED | OUTPATIENT
Start: 2024-10-31 | End: 2024-10-31

## 2024-10-31 RX ADMIN — AMPICILLIN SODIUM AND SULBACTAM SODIUM 3 G: 2; 1 INJECTION, POWDER, FOR SOLUTION INTRAMUSCULAR; INTRAVENOUS at 11:21

## 2024-10-31 ASSESSMENT — COLUMBIA-SUICIDE SEVERITY RATING SCALE - C-SSRS
6. HAVE YOU EVER DONE ANYTHING, STARTED TO DO ANYTHING, OR PREPARED TO DO ANYTHING TO END YOUR LIFE?: NO
2. HAVE YOU ACTUALLY HAD ANY THOUGHTS OF KILLING YOURSELF IN THE PAST MONTH?: NO
1. IN THE PAST MONTH, HAVE YOU WISHED YOU WERE DEAD OR WISHED YOU COULD GO TO SLEEP AND NOT WAKE UP?: NO

## 2024-10-31 ASSESSMENT — ACTIVITIES OF DAILY LIVING (ADL): ADLS_ACUITY_SCORE: 0

## 2024-10-31 NOTE — ED NOTES
All patient questions answered, no further questions at this time. Discharge paperwork reviewed with patient. Patient verbalized understanding of discharge teaching, medications, when to return, and the importance of follow up. Patient verbalizes feeling safe to return home.   [Intrauterine Pregnancy] : intrauterine pregnancy [Fetal Heart] : fetal heart present [CRL: ___ (mm)] : CRL - [unfilled]Umm [Date: ___] : Date: [unfilled] [Current GA by Sonogram: ___ (wks)] : Current GA by Sonogram: [unfilled]Uwks [___ day(s)] : [unfilled] days [Transvaginal OB Sonogram WNL] : Transvaginal OB Sonogram WNL [FreeTextEntry1] : Viable IUP Crl 52mm-11.6 wks c/w dates, good fhr

## 2024-10-31 NOTE — ED PROVIDER NOTES
Emergency Department Note      History of Present Illness     Chief Complaint   Laceration      HPI   Lynn Thompson is a 59 year old female with history of diabetes, chronic pancreatitis, HTN, asplenia who presents with dog bite. Patient reports she was bit by her own dog while  her cat. Her dog is current with vaccinations including rabies. She has a puncture wound to her left index finger. Patient reports pain with flexion of finger but no active bleeding. She reports she was bit by same dog years ago and developed a deep space infection in her left hand that required surgery.     Independent Historian   None    Review of External Notes   Tetaus from 2020.    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    Sleep apnea     Spasm of sphincter of Oddi     Thyroid nodule 11/01/2016       Medications   amoxicillin-clavulanate (AUGMENTIN) 875-125 MG tablet  fluconazole (DIFLUCAN) 150 MG tablet  acetaminophen (TYLENOL) 325 MG tablet  blood glucose (CONTOUR NEXT TEST) test strip  blood glucose (NO BRAND SPECIFIED) lancets standard  calcium carbonate 600 mg-vitamin D 400 units (CALTRATE) 600-400 MG-UNIT per tablet  Calcium Carbonate-Vitamin D (CALTRATE 600+D PO)  Continuous Glucose Sensor (DEXCOM G6 SENSOR) MISC  Continuous Glucose Transmitter (DEXCOM G6 TRANSMITTER) MISC  DULoxetine (CYMBALTA) 20 MG capsule  escitalopram (LEXAPRO) 20 MG tablet  famotidine (PEPCID) 40 MG tablet  FIASP 100 UNIT/ML SOLN  FIASP PENFILL 100 UNIT/ML SOCT  gabapentin (NEURONTIN) 300 MG capsule  Glucagon (GVOKE HYPOPEN 1-PACK) 1  MG/0.2ML SOAJ  hydrOXYzine (ATARAX) 25 MG tablet  Insulin Disposable Pump (OMNIPOD 5 G6 POD, GEN 5,) MISC  insulin glargine (LANTUS PEN) 100 UNIT/ML pen  INSULIN PUMP - OUTPATIENT  levothyroxine (SYNTHROID/LEVOTHROID) 125 MCG tablet  lipase-protease-amylase (VIOKACE) 32476-18[address]-94054 units TABS tablet  loratadine (CLARITIN) 10 MG tablet  Nutritional Supplements (BOOST HIGH PROTEIN) LIQD  omeprazole (PRILOSEC) 40 MG DR capsule  ondansetron (ZOFRAN ODT) 4 MG ODT tab  potassium citrate (UROCIT-K) 10 MEQ (1080 MG) CR tablet  traZODone (DESYREL) 50 MG tablet        Surgical History   Past Surgical History:   Procedure Laterality Date    ABDOMINOPLASTY  2002    Tummy tuck    APPENDECTOMY  1990    BUNIONECTOMY Right 1998    CBD Stent placement  2002    CBD stent; Dr. Presley     SECTION      CHOLECYSTECTOMY      COLONOSCOPY N/A 2021    Procedure: COLONOSCOPY INCOMPLETE Aborted due to incomplete prep  will need to take additional prep and return tomorrow 21;  Surgeon: Ihsan Saenz MD;  Location: [facility]    COMBINED CYSTOSCOPY, RETROGRADES, URETEROSCOPY, LASER HOLMIUM LITHOTRIPSY URETER(S), INSERT STENT Right 2015    Procedure: COMBINED CYSTOSCOPY, RETROGRADES, URETEROSCOPY, LASER HOLMIUM LITHOTRIPSY URETER(S), INSERT STENT;  Surgeon: Kennedi Aldana MD;  Location: [facility]    COMBINED CYSTOSCOPY, RETROGRADES, URETEROSCOPY, LASER HOLMIUM LITHOTRIPSY URETER(S), INSERT STENT Right 2015    Procedure: COMBINED CYSTOSCOPY, RETROGRADES, URETEROSCOPY, LASER HOLMIUM LITHOTRIPSY URETER(S), INSERT STENT;  Surgeon: Kennedi Aldana MD;  Location: [facility]    COSMETIC SURGERY  2002    Tummy tuck    CYSTECTOMY OVARIAN BENIGN Right     CYSTOSCOPY, RETROGRADES, INSERT STENT URETER(S), COMBINED  10/02/2012    Procedure: COMBINED CYSTOSCOPY, RETROGRADES, INSERT STENT URETER(S);  COMBINED CYSTOSCOPY,  , INSERT LEFT STENT URETER;  Surgeon: Johny Baez MD;  Location: [facility]     CYSTOSCOPY, RETROGRADES, INSERT STENT URETER(S), COMBINED Left 9/20/2022    Procedure: Cystoscopy with left retrograde pyelogram, left ureteroscopy, thulium laser lithotripsy of left ureteral calculus, stone basketing and left ureteral stent insertion;  Surgeon: Alonzo Rome MD;  Location: [facility]    ESOPHAGOSCOPY, GASTROSCOPY, DUODENOSCOPY (EGD), COMBINED N/A 01/24/2018    Procedure: COMBINED ESOPHAGOSCOPY, GASTROSCOPY, DUODENOSCOPY (EGD);  ESOPHAGOSCOPY, GASTROSCOPY, DUODENOSCOPY (EGD)    ;  Surgeon: Tamir Rodgers MD;  Location: [facility]    EXTRACORPOREAL SHOCK WAVE LITHOTRIPSY (ESWL)  10/16/2012    Procedure: EXTRACORPOREAL SHOCK WAVE LITHOTRIPSY (ESWL);  left EXTRACORPOREAL SHOCK WAVE LITHOTRIPSY (ESWL) ;  Surgeon: Johny Baez MD;  Location: [facility]    Gastric bypass NOS      HERNIA REPAIR  02/2015    HYSTERECTOMY SUPRACERVICAL, BILATERAL SALPINGO-OOPHORECTOMY, COMBINED N/A 02/01/2022    Procedure: Abdominal supracervical hysterectomy, bilateral salpingooophrectomy;  Surgeon: Nicole Rivera MD;  [facility]    IRRIGATION AND DEBRIDEMENT HAND, COMBINED Left 10/30/2020    Procedure: Left hand sharp excisional debridement of skin, subcutaneous tissue and fat with a scalpel, 2 x 1 x 1 cm.;  Surgeon: Demian Renteria MD;  Location: [facility]    LAPAROSCOPIC LYSIS ADHESIONS N/A 02/20/2015    Procedure: LAPAROSCOPIC LYSIS ADHESIONS;  Surgeon: Aaron Early MD;  [facility]    LAPAROSCOPIC LYSIS ADHESIONS N/A 12/29/2015    Procedure: LAPAROSCOPIC LYSIS ADHESIONS;  Surgeon: Aaron Early MD;  [facility]    PANCREATECTOMY, TRANSPLANT AUTO ISLET CELL, COMBINED  05/10/2013    Procedure: COMBINED PANCREATECTOMY, TRANSPLANT AUTO ISLET CELL;  Pancreatectomy, Auto Islet Cell Transplant   hernia repair, jejunostomy tube and liver biopsies with Anesthesia General with block;  Surgeon: Aaron Early MD;  Location: [facility]    Partial ileum resection  1992    RECTOPEXY ABDOMINAL N/A 02/01/2022     "Procedure: RECTOPEXY, ABDOMINAL;  Surgeon: Uriah Sheridan MD;  [facility]    REPAIR PTOSIS BROW BILATERAL Bilateral 06/09/2020    Procedure: BILATERAL BROW PTOSIS REPAIR;  Surgeon: Denise Alberts MD;  Location: [facility]    SACROCOLPOPEXY, CYSTOSCOPY, COMBINED N/A 02/01/2022    Procedure: Uterosacral ligament suspension, pina colposuspension with Cystoscopy;  Surgeon: Nicole Rivera MD;  [facility]    SIGMOIDOSCOPY FLEXIBLE N/A 02/01/2022    Procedure: SIGMOIDOSCOPY, FLEXIBLE;  Surgeon: Uriah Sheridan MD;  [facility]    Surgery for SBO  2015    TONSILLECTOMY, ADENOIDECTOMY, COMBINED  1997    TRANSPLANT  5/10/13    Pancreatic Auto-Islet Transplant       Physical Exam     Patient Vitals for the past 24 hrs:   BP Temp Temp src Pulse Resp SpO2 Height Weight   10/31/24 1050 -- -- -- -- -- -- 1.626 m (5' 4\") 61.2 kg (135 lb)   10/31/24 1004 (!) 140/74 97.5  F (36.4  C) Temporal 69 16 99 % -- --     Physical Exam  Constitutional: Alert, attentive, GCS 15  HENT:    Nose: Nose normal.    Mouth/Throat: Oropharynx is clear, mucous membranes are moist   Eyes: EOM are normal.   CV: regular rate and rhythm; no murmurs, rubs or gallups  Chest: Effort normal and breath sounds normal.   MSK: Normal range of motion.   Neurological: Alert, attentive  Skin: Skin is warm and dry. Superficial puncture wound to PIP joint of left index finger. Limited flexion secondary to pain. No erythema, fluctuance, or purulent drainage.    Diagnostics     Lab Results   Labs Ordered and Resulted from Time of ED Arrival to Time of ED Departure - No data to display    Imaging   Fingers XR, 2-3 views, left   Final Result   IMPRESSION:      There is a 3 mm radiodensity similar to that of bone along the radial   aspect of the index finger proximal phalangeal neck which was not   present on 10/29/2020. This could represent a small small foreign body   given the history, however interval heterotopic " ossification could   have similar appearance.      Adjacent soft tissue swelling. No definite acute fracture or   dislocation.      JOE OH MD            SYSTEM ID:  [id number]          EKG   None    Independent Interpretation   X-ray left finger shows small radiodensity adjacent to medial left index finger at PIP.    ED Course      Medications Administered   Medications   ampicillin-sulbactam (UNASYN) 3 g vial to attach to  mL bag (0 g Intravenous Stopped 10/31/24 6370)       Procedures   Procedures     Discussion of Management   None    ED Course        Additional Documentation  None    Medical Decision Making / Diagnosis     CMS Diagnoses: No concern for sepsis.    MIPS       None    MDM   Lynn Thompson is a 59 year old female with history of diabetes and asplenia who presents with dog bite to the left index finger.  Bite sustained from her own dog that is current with its vaccinations including rabies.  Patient's tetanus is also current from 2020. Bite appears superficial on the IP joint of left index finger.  Patient notes that she sustained a dog bite 4 years ago and had to undergo surgery for infection in her hand.  She is quite concerned for this today considering her asplenia history.  X-ray of the finger shows a small radiodensity adjacent to the IP joint suspicious for possible foreign body versus small chronic appearing bone fragment.  Given her bedside exam, wound does not appear to be very deep however offered to explore the wound.  Patient declined this stating that she does not want any exploration of the wound.  I offered first dose of oral antibiotic however given her history of asplenia and deep space infection of her hand, patient would prefer parenteral antibiotic.  She was given IV Unasyn and then will be sent home with oral Augmentin.  Recommend keeping wound clean and dry and watching for signs of infection such as spreading redness, purulent drainage, or fevers.   Follow-up with primary care for wound check.  Patient is comfortable with plan and is discharged home.  Of note, patient gets yeast infections with oral antibiotics and so was sent diflucan at discharge.    Disposition   The patient was discharged.     Diagnosis     ICD-10-CM    1. Dog bite of index finger, initial encounter  S61.258A     W54.0XXA       2. Asplenia  Q89.01            Discharge Medications   Discharge Medication List as of 10/31/2024 11:40 AM        START taking these medications    Details   amoxicillin-clavulanate (AUGMENTIN) 875-125 MG tablet Take 1 tablet by mouth 2 times daily for 7 days., Disp-14 tablet, R-0, E-Prescribe      fluconazole (DIFLUCAN) 150 MG tablet Take one tablet now, and one tablet in three days, Disp-2 tablet, R-0, E-Prescribe               10:45 AM  October 31, 2024  PIERRE AUSTIN Emily, PA-C  10/31/24 1802       Erlinda Cooley PA-C  10/31/24 1802

## 2024-10-31 NOTE — DISCHARGE INSTRUCTIONS
Your wound was irrigated and dressed in the department. Keep finger clean and dry and away from contaminated water such as pools, lakes, etc. You will start an oral antibiotic. Please take as prescribed and follow-up with primary care for wound check. Contact information also provided for local orthopedic clinic. For any new or worsening symptoms such as fevers, spreading redness, or purulent discharge, return to ED.

## 2024-10-31 NOTE — ED TRIAGE NOTES
Very small laceration from dog bite to left index finger. Bleeding controlled without bandage. Was pt's own dog. Pt alert and ambulatory. Denies other injuries.

## 2024-11-01 ENCOUNTER — ANESTHESIA EVENT (OUTPATIENT)
Dept: SURGERY | Facility: CLINIC | Age: 59
End: 2024-11-01
Payer: COMMERCIAL

## 2024-11-01 ENCOUNTER — HOSPITAL ENCOUNTER (INPATIENT)
Facility: CLINIC | Age: 59
LOS: 4 days | Discharge: HOME | End: 2024-11-05
Attending: EMERGENCY MEDICINE | Admitting: INTERNAL MEDICINE
Payer: COMMERCIAL

## 2024-11-01 DIAGNOSIS — L03.012 CELLULITIS OF LEFT INDEX FINGER: ICD-10-CM

## 2024-11-01 DIAGNOSIS — S61.251A OPEN WOUND OF LEFT INDEX FINGER DUE TO DOG BITE: Primary | ICD-10-CM
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
## 2024-11-01 DIAGNOSIS — M65.10 INFECTIOUS TENOSYNOVITIS: ICD-10-CM

## 2024-11-01 LAB
ANION GAP SERPL CALCULATED.3IONS-SCNC: 13 MMOL/L (ref 7–15)
BASOPHILS # BLD AUTO: 0.1 10E3/UL (ref 0–0.2)
BASOPHILS NFR BLD AUTO: 1 %
BUN SERPL-MCNC: 22.2 MG/DL (ref 8–23)
CALCIUM SERPL-MCNC: 8.9 MG/DL (ref 8.8–10.4)
CHLORIDE SERPL-SCNC: 105 MMOL/L (ref 98–107)
CREAT SERPL-MCNC: 0.96 MG/DL (ref 0.51–0.95)
CRP SERPL-MCNC: <3 MG/L
EGFRCR SERPLBLD CKD-EPI 2021: 67 ML/MIN/1.73M2
EOSINOPHIL # BLD AUTO: 0.2 10E3/UL (ref 0–0.7)
EOSINOPHIL NFR BLD AUTO: 2 %
ERYTHROCYTE [DISTWIDTH] IN BLOOD BY AUTOMATED COUNT: 14.8 % (ref 10–15)
ERYTHROCYTE [SEDIMENTATION RATE] IN BLOOD BY WESTERGREN METHOD: 10 MM/HR (ref 0–30)
GLUCOSE BLDC GLUCOMTR-MCNC: 172 MG/DL (ref 70–99)
GLUCOSE BLDC GLUCOMTR-MCNC: 74 MG/DL (ref 70–99)
GLUCOSE BLDC GLUCOMTR-MCNC: 86 MG/DL (ref 70–99)
GLUCOSE BLDC GLUCOMTR-MCNC: 89 MG/DL (ref 70–99)
GLUCOSE BLDC GLUCOMTR-MCNC: 98 MG/DL (ref 70–99)
GLUCOSE SERPL-MCNC: 74 MG/DL (ref 70–99)
HCO3 BLDV-SCNC: 26 MMOL/L (ref 21–28)
HCO3 SERPL-SCNC: 24 MMOL/L (ref 22–29)
HCT VFR BLD AUTO: 38.3 % (ref 35–47)
HGB BLD-MCNC: 12 G/DL (ref 11.7–15.7)
HOLD SPECIMEN: NORMAL
HOLD SPECIMEN: NORMAL
IMM GRANULOCYTES # BLD: 0 10E3/UL
IMM GRANULOCYTES NFR BLD: 0 %
LACTATE BLD-SCNC: <0.3 MMOL/L
LYMPHOCYTES # BLD AUTO: 2.5 10E3/UL (ref 0.8–5.3)
LYMPHOCYTES NFR BLD AUTO: 24 %
MCH RBC QN AUTO: 29.3 PG (ref 26.5–33)
MCHC RBC AUTO-ENTMCNC: 31.3 G/DL (ref 31.5–36.5)
MCV RBC AUTO: 93 FL (ref 78–100)
MONOCYTES # BLD AUTO: 1.2 10E3/UL (ref 0–1.3)
MONOCYTES NFR BLD AUTO: 12 %
NEUTROPHILS # BLD AUTO: 6.4 10E3/UL (ref 1.6–8.3)
NEUTROPHILS NFR BLD AUTO: 61 %
NRBC # BLD AUTO: 0 10E3/UL
NRBC BLD AUTO-RTO: 0 /100
PCO2 BLDV: 54 MM HG (ref 40–50)
PH BLDV: 7.3 [PH] (ref 7.32–7.43)
PLATELET # BLD AUTO: 349 10E3/UL (ref 150–450)
PO2 BLDV: 16 MM HG (ref 25–47)
POTASSIUM SERPL-SCNC: 3.8 MMOL/L (ref 3.4–5.3)
RBC # BLD AUTO: 4.1 10E6/UL (ref 3.8–5.2)
SAO2 % BLDV: 17 % (ref 70–75)
SODIUM SERPL-SCNC: 142 MMOL/L (ref 135–145)
WBC # BLD AUTO: 10.4 10E3/UL (ref 4–11)

## 2024-11-01 PROCEDURE — 250N000013 HC RX MED GY IP 250 OP 250 PS 637: Performed by: INTERNAL MEDICINE

## 2024-11-01 PROCEDURE — 87040 BLOOD CULTURE FOR BACTERIA: CPT | Performed by: EMERGENCY MEDICINE

## 2024-11-01 PROCEDURE — 96375 TX/PRO/DX INJ NEW DRUG ADDON: CPT

## 2024-11-01 PROCEDURE — 120N000001 HC R&B MED SURG/OB

## 2024-11-01 PROCEDURE — 36415 COLL VENOUS BLD VENIPUNCTURE: CPT | Performed by: EMERGENCY MEDICINE

## 2024-11-01 PROCEDURE — 82962 GLUCOSE BLOOD TEST: CPT

## 2024-11-01 PROCEDURE — 99222 1ST HOSP IP/OBS MODERATE 55: CPT | Performed by: INTERNAL MEDICINE

## 2024-11-01 PROCEDURE — 250N000011 HC RX IP 250 OP 636: Performed by: EMERGENCY MEDICINE

## 2024-11-01 PROCEDURE — 80048 BASIC METABOLIC PNL TOTAL CA: CPT | Performed by: EMERGENCY MEDICINE

## 2024-11-01 PROCEDURE — 82803 BLOOD GASES ANY COMBINATION: CPT

## 2024-11-01 PROCEDURE — 86140 C-REACTIVE PROTEIN: CPT | Performed by: EMERGENCY MEDICINE

## 2024-11-01 PROCEDURE — 250N000011 HC RX IP 250 OP 636: Performed by: INTERNAL MEDICINE

## 2024-11-01 PROCEDURE — 99285 EMERGENCY DEPT VISIT HI MDM: CPT | Mod: 25

## 2024-11-01 PROCEDURE — 96374 THER/PROPH/DIAG INJ IV PUSH: CPT

## 2024-11-01 PROCEDURE — 82947 ASSAY GLUCOSE BLOOD QUANT: CPT | Performed by: EMERGENCY MEDICINE

## 2024-11-01 PROCEDURE — 99207 PR NO BILLABLE SERVICE THIS VISIT: CPT | Performed by: INTERNAL MEDICINE

## 2024-11-01 PROCEDURE — 85652 RBC SED RATE AUTOMATED: CPT | Performed by: EMERGENCY MEDICINE

## 2024-11-01 PROCEDURE — 85004 AUTOMATED DIFF WBC COUNT: CPT | Performed by: EMERGENCY MEDICINE

## 2024-11-01 RX ORDER — ONDANSETRON 2 MG/ML
4 INJECTION INTRAMUSCULAR; INTRAVENOUS EVERY 6 HOURS PRN
Status: DISCONTINUED | OUTPATIENT
Start: 2024-11-01 | End: 2024-11-04

## 2024-11-01 RX ORDER — POLYETHYLENE GLYCOL 3350 17 G/17G
17 POWDER, FOR SOLUTION ORAL 2 TIMES DAILY PRN
Status: DISCONTINUED | OUTPATIENT
Start: 2024-11-01 | End: 2024-11-04

## 2024-11-01 RX ORDER — NALOXONE HYDROCHLORIDE 0.4 MG/ML
0.4 INJECTION, SOLUTION INTRAMUSCULAR; INTRAVENOUS; SUBCUTANEOUS
Status: DISCONTINUED | OUTPATIENT
Start: 2024-11-01 | End: 2024-11-05 | Stop reason: HOSPADM

## 2024-11-01 RX ORDER — PANTOPRAZOLE SODIUM 40 MG/1
40 TABLET, DELAYED RELEASE ORAL
Status: DISCONTINUED | OUTPATIENT
Start: 2024-11-01 | End: 2024-11-05 | Stop reason: HOSPADM

## 2024-11-01 RX ORDER — DEXTROSE MONOHYDRATE 25 G/50ML
25-50 INJECTION, SOLUTION INTRAVENOUS
Status: DISCONTINUED | OUTPATIENT
Start: 2024-11-01 | End: 2024-11-01

## 2024-11-01 RX ORDER — PROCHLORPERAZINE 25 MG
25 SUPPOSITORY, RECTAL RECTAL EVERY 12 HOURS PRN
Status: DISCONTINUED | OUTPATIENT
Start: 2024-11-01 | End: 2024-11-05 | Stop reason: HOSPADM

## 2024-11-01 RX ORDER — AMOXICILLIN 250 MG
2 CAPSULE ORAL 2 TIMES DAILY PRN
Status: DISCONTINUED | OUTPATIENT
Start: 2024-11-01 | End: 2024-11-04

## 2024-11-01 RX ORDER — ACETAMINOPHEN 500 MG
1000 TABLET ORAL EVERY 8 HOURS
Status: DISCONTINUED | OUTPATIENT
Start: 2024-11-01 | End: 2024-11-05 | Stop reason: HOSPADM

## 2024-11-01 RX ORDER — POTASSIUM CITRATE 10 MEQ/1
10 TABLET, EXTENDED RELEASE ORAL DAILY
Status: DISCONTINUED | OUTPATIENT
Start: 2024-11-01 | End: 2024-11-05 | Stop reason: HOSPADM

## 2024-11-01 RX ORDER — CYCLOBENZAPRINE HCL 10 MG
10 TABLET ORAL 3 TIMES DAILY PRN
COMMUNITY

## 2024-11-01 RX ORDER — ACETAMINOPHEN 650 MG/1
650 SUPPOSITORY RECTAL EVERY 4 HOURS PRN
Status: DISCONTINUED | OUTPATIENT
Start: 2024-11-01 | End: 2024-11-04

## 2024-11-01 RX ORDER — AMOXICILLIN 250 MG
1 CAPSULE ORAL 2 TIMES DAILY PRN
Status: DISCONTINUED | OUTPATIENT
Start: 2024-11-01 | End: 2024-11-04

## 2024-11-01 RX ORDER — OXYCODONE HYDROCHLORIDE 5 MG/1
5 TABLET ORAL EVERY 4 HOURS PRN
Status: DISCONTINUED | OUTPATIENT
Start: 2024-11-01 | End: 2024-11-04

## 2024-11-01 RX ORDER — ONDANSETRON 2 MG/ML
4 INJECTION INTRAMUSCULAR; INTRAVENOUS ONCE
Status: DISCONTINUED | OUTPATIENT
Start: 2024-11-01 | End: 2024-11-01

## 2024-11-01 RX ORDER — DEXTROSE MONOHYDRATE 25 G/50ML
25-50 INJECTION, SOLUTION INTRAVENOUS
Status: DISCONTINUED | OUTPATIENT
Start: 2024-11-01 | End: 2024-11-05 | Stop reason: HOSPADM

## 2024-11-01 RX ORDER — NALOXONE HYDROCHLORIDE 0.4 MG/ML
0.2 INJECTION, SOLUTION INTRAMUSCULAR; INTRAVENOUS; SUBCUTANEOUS
Status: DISCONTINUED | OUTPATIENT
Start: 2024-11-01 | End: 2024-11-05 | Stop reason: HOSPADM

## 2024-11-01 RX ORDER — HYDROXYZINE HYDROCHLORIDE 25 MG/1
25 TABLET, FILM COATED ORAL DAILY PRN
Status: DISCONTINUED | OUTPATIENT
Start: 2024-11-01 | End: 2024-11-05 | Stop reason: HOSPADM

## 2024-11-01 RX ORDER — PROCHLORPERAZINE MALEATE 5 MG/1
10 TABLET ORAL EVERY 6 HOURS PRN
Status: DISCONTINUED | OUTPATIENT
Start: 2024-11-01 | End: 2024-11-05 | Stop reason: HOSPADM

## 2024-11-01 RX ORDER — AMPICILLIN AND SULBACTAM 2; 1 G/1; G/1
3 INJECTION, POWDER, FOR SOLUTION INTRAMUSCULAR; INTRAVENOUS EVERY 6 HOURS
Status: DISCONTINUED | OUTPATIENT
Start: 2024-11-01 | End: 2024-11-04

## 2024-11-01 RX ORDER — HYDROMORPHONE HYDROCHLORIDE 1 MG/ML
0.5 INJECTION, SOLUTION INTRAMUSCULAR; INTRAVENOUS; SUBCUTANEOUS
Status: DISCONTINUED | OUTPATIENT
Start: 2024-11-01 | End: 2024-11-05 | Stop reason: HOSPADM

## 2024-11-01 RX ORDER — NICOTINE POLACRILEX 4 MG
15-30 LOZENGE BUCCAL
Status: DISCONTINUED | OUTPATIENT
Start: 2024-11-01 | End: 2024-11-05 | Stop reason: HOSPADM

## 2024-11-01 RX ORDER — PIPERACILLIN SODIUM, TAZOBACTAM SODIUM 4; .5 G/20ML; G/20ML
4.5 INJECTION, POWDER, LYOPHILIZED, FOR SOLUTION INTRAVENOUS ONCE
Status: DISCONTINUED | OUTPATIENT
Start: 2024-11-01 | End: 2024-11-01

## 2024-11-01 RX ORDER — LIDOCAINE 40 MG/G
CREAM TOPICAL
Status: DISCONTINUED | OUTPATIENT
Start: 2024-11-01 | End: 2024-11-05 | Stop reason: HOSPADM

## 2024-11-01 RX ORDER — FAMOTIDINE 20 MG/1
40 TABLET, FILM COATED ORAL 2 TIMES DAILY
Status: DISCONTINUED | OUTPATIENT
Start: 2024-11-01 | End: 2024-11-05 | Stop reason: HOSPADM

## 2024-11-01 RX ORDER — LORATADINE 10 MG/1
10 TABLET ORAL DAILY PRN
Status: DISCONTINUED | OUTPATIENT
Start: 2024-11-01 | End: 2024-11-05 | Stop reason: HOSPADM

## 2024-11-01 RX ORDER — HYDROMORPHONE HCL IN WATER/PF 6 MG/30 ML
0.4 PATIENT CONTROLLED ANALGESIA SYRINGE INTRAVENOUS
Status: DISCONTINUED | OUTPATIENT
Start: 2024-11-01 | End: 2024-11-01

## 2024-11-01 RX ORDER — ESCITALOPRAM OXALATE 20 MG/1
20 TABLET ORAL DAILY
Status: DISCONTINUED | OUTPATIENT
Start: 2024-11-01 | End: 2024-11-05 | Stop reason: HOSPADM

## 2024-11-01 RX ORDER — ONDANSETRON 4 MG/1
4 TABLET, ORALLY DISINTEGRATING ORAL EVERY 6 HOURS PRN
Status: DISCONTINUED | OUTPATIENT
Start: 2024-11-01 | End: 2024-11-04

## 2024-11-01 RX ORDER — MORPHINE SULFATE 4 MG/ML
4 INJECTION, SOLUTION INTRAMUSCULAR; INTRAVENOUS ONCE
Status: DISCONTINUED | OUTPATIENT
Start: 2024-11-01 | End: 2024-11-01

## 2024-11-01 RX ORDER — DULOXETIN HYDROCHLORIDE 20 MG/1
20 CAPSULE, DELAYED RELEASE ORAL DAILY
Status: DISCONTINUED | OUTPATIENT
Start: 2024-11-01 | End: 2024-11-05 | Stop reason: HOSPADM

## 2024-11-01 RX ORDER — NICOTINE POLACRILEX 4 MG
15-30 LOZENGE BUCCAL
Status: DISCONTINUED | OUTPATIENT
Start: 2024-11-01 | End: 2024-11-01

## 2024-11-01 RX ORDER — GABAPENTIN 300 MG/1
300 CAPSULE ORAL 2 TIMES DAILY
Status: DISCONTINUED | OUTPATIENT
Start: 2024-11-01 | End: 2024-11-05 | Stop reason: HOSPADM

## 2024-11-01 RX ORDER — CALCIUM CARBONATE 500 MG/1
1000 TABLET, CHEWABLE ORAL 4 TIMES DAILY PRN
Status: DISCONTINUED | OUTPATIENT
Start: 2024-11-01 | End: 2024-11-05 | Stop reason: HOSPADM

## 2024-11-01 RX ORDER — TRAZODONE HYDROCHLORIDE 100 MG/1
100 TABLET ORAL
Status: DISCONTINUED | OUTPATIENT
Start: 2024-11-01 | End: 2024-11-05 | Stop reason: HOSPADM

## 2024-11-01 RX ORDER — ACETAMINOPHEN 325 MG/1
650 TABLET ORAL EVERY 4 HOURS PRN
Status: DISCONTINUED | OUTPATIENT
Start: 2024-11-01 | End: 2024-11-04

## 2024-11-01 RX ORDER — AMPICILLIN AND SULBACTAM 2; 1 G/1; G/1
3 INJECTION, POWDER, FOR SOLUTION INTRAMUSCULAR; INTRAVENOUS EVERY 6 HOURS
Status: COMPLETED | OUTPATIENT
Start: 2024-11-01 | End: 2024-11-01

## 2024-11-01 RX ORDER — HYDROMORPHONE HCL IN WATER/PF 6 MG/30 ML
0.2 PATIENT CONTROLLED ANALGESIA SYRINGE INTRAVENOUS
Status: DISCONTINUED | OUTPATIENT
Start: 2024-11-01 | End: 2024-11-05 | Stop reason: HOSPADM

## 2024-11-01 RX ADMIN — MORPHINE SULFATE 4 MG: 4 INJECTION, SOLUTION INTRAMUSCULAR; INTRAVENOUS at 03:30

## 2024-11-01 RX ADMIN — GABAPENTIN 300 MG: 300 CAPSULE ORAL at 10:18

## 2024-11-01 RX ADMIN — PANTOPRAZOLE SODIUM 40 MG: 40 TABLET, DELAYED RELEASE ORAL at 08:07

## 2024-11-01 RX ADMIN — FAMOTIDINE 40 MG: 20 TABLET ORAL at 20:17

## 2024-11-01 RX ADMIN — AMPICILLIN SODIUM AND SULBACTAM SODIUM 3 G: 2; 1 INJECTION, POWDER, FOR SOLUTION INTRAMUSCULAR; INTRAVENOUS at 22:02

## 2024-11-01 RX ADMIN — AMPICILLIN SODIUM AND SULBACTAM SODIUM 3 G: 2; 1 INJECTION, POWDER, FOR SOLUTION INTRAMUSCULAR; INTRAVENOUS at 16:15

## 2024-11-01 RX ADMIN — HYDROMORPHONE HYDROCHLORIDE 0.4 MG: 0.2 INJECTION, SOLUTION INTRAMUSCULAR; INTRAVENOUS; SUBCUTANEOUS at 10:14

## 2024-11-01 RX ADMIN — HYDROMORPHONE HYDROCHLORIDE 0.2 MG: 0.2 INJECTION, SOLUTION INTRAMUSCULAR; INTRAVENOUS; SUBCUTANEOUS at 04:59

## 2024-11-01 RX ADMIN — HYDROMORPHONE HYDROCHLORIDE 0.2 MG: 0.2 INJECTION, SOLUTION INTRAMUSCULAR; INTRAVENOUS; SUBCUTANEOUS at 13:07

## 2024-11-01 RX ADMIN — LEVOTHYROXINE SODIUM 125 MCG: 0.03 TABLET ORAL at 09:23

## 2024-11-01 RX ADMIN — HYDROMORPHONE HYDROCHLORIDE 0.4 MG: 0.2 INJECTION, SOLUTION INTRAMUSCULAR; INTRAVENOUS; SUBCUTANEOUS at 08:04

## 2024-11-01 RX ADMIN — PANCRELIPASE 3 CAPSULE: 36000; 180000; 114000 CAPSULE, DELAYED RELEASE PELLETS ORAL at 17:22

## 2024-11-01 RX ADMIN — PANTOPRAZOLE SODIUM 40 MG: 40 TABLET, DELAYED RELEASE ORAL at 16:15

## 2024-11-01 RX ADMIN — POTASSIUM CITRATE 10 MEQ: 10 TABLET, EXTENDED RELEASE ORAL at 11:00

## 2024-11-01 RX ADMIN — GABAPENTIN 300 MG: 300 CAPSULE ORAL at 20:17

## 2024-11-01 RX ADMIN — OXYCODONE HYDROCHLORIDE 2.5 MG: 5 TABLET ORAL at 14:25

## 2024-11-01 RX ADMIN — PANCRELIPASE 2 CAPSULE: 36000; 180000; 114000 CAPSULE, DELAYED RELEASE PELLETS ORAL at 14:25

## 2024-11-01 RX ADMIN — HYDROXYZINE HYDROCHLORIDE 25 MG: 25 TABLET, FILM COATED ORAL at 05:39

## 2024-11-01 RX ADMIN — ACETAMINOPHEN 1000 MG: 500 TABLET, FILM COATED ORAL at 20:17

## 2024-11-01 RX ADMIN — ACETAMINOPHEN 1000 MG: 500 TABLET, FILM COATED ORAL at 13:00

## 2024-11-01 RX ADMIN — OXYCODONE HYDROCHLORIDE 5 MG: 5 TABLET ORAL at 22:38

## 2024-11-01 RX ADMIN — OXYCODONE HYDROCHLORIDE 2.5 MG: 5 TABLET ORAL at 18:35

## 2024-11-01 RX ADMIN — ONDANSETRON 4 MG: 2 INJECTION INTRAMUSCULAR; INTRAVENOUS at 03:31

## 2024-11-01 RX ADMIN — ACETAMINOPHEN 650 MG: 325 TABLET, FILM COATED ORAL at 22:37

## 2024-11-01 RX ADMIN — AMPICILLIN SODIUM AND SULBACTAM SODIUM 3 G: 2; 1 INJECTION, POWDER, FOR SOLUTION INTRAMUSCULAR; INTRAVENOUS at 09:25

## 2024-11-01 RX ADMIN — FAMOTIDINE 40 MG: 20 TABLET ORAL at 10:18

## 2024-11-01 RX ADMIN — DULOXETINE HYDROCHLORIDE 20 MG: 20 CAPSULE, DELAYED RELEASE ORAL at 10:18

## 2024-11-01 RX ADMIN — AMPICILLIN SODIUM AND SULBACTAM SODIUM 3 G: 2; 1 INJECTION, POWDER, FOR SOLUTION INTRAMUSCULAR; INTRAVENOUS at 03:48

## 2024-11-01 RX ADMIN — ESCITALOPRAM OXALATE 20 MG: 5 TABLET, FILM COATED ORAL at 10:18

## 2024-11-01 ASSESSMENT — ACTIVITIES OF DAILY LIVING (ADL)
ADLS_ACUITY_SCORE: 0

## 2024-11-01 NOTE — PROGRESS NOTES
"[facility]    ED Boarding Nurse Handoff Addendum Report:    Date/time: 11/1/2024, 7:25 AM    Activity Level: standby    Fall Risk: No    Active Infusions: none    Current Meds Due: See mar    Current care needs: pain management    Oxygen requirements (liters/min and/or FiO2): RA    Respiratory status: Room air    Vital signs (within last 30 minutes):    Vitals:    11/01/24 0233 11/01/24 0234 11/01/24 0446 11/01/24 0500   BP:  (!) 143/88 132/62    BP Location:   Right arm    Patient Position:   Semi-Marte's    Cuff Size:   Adult Regular    Pulse: 105  67    Resp: 20  18    Temp: 98  F (36.7  C)  98.1  F (36.7  C)    TempSrc: Temporal  Oral    SpO2: 98%   99%   Weight: 68.6 kg (151 lb 3.8 oz)      Height: 1.626 m (5' 4\")          Focused assessment within last 30 minutes:      ED Boarding Nurse name: Senia Flores RN   "

## 2024-11-01 NOTE — ED PROVIDER NOTES
Emergency Department Note      History of Present Illness     Chief Complaint   Dog Bite      HPI   Lynn Thompson is a 59 year old female presents to the emergency department for dog bites to the left index finger.  This occurred this morning while patient was attempting to break up a fight between the dog and a cat.  Patient was evaluated this morning at this ED and given single dose of IV Unasyn and discharged home with p.o. antibiotics, but since going home, pain has progressively worsened and patient started developing ascending swelling all the way up to the first knuckle similar to prior when patient had a deep space hand infection/tenosynovitis requiring surgical procedure to the hand.  Patient denies any fevers.  Up-to-date on tetanus vaccination.  Dog also up-to-date on vaccinations.  Patient unable to bend digit due to pain and swelling.  History of asplenia.    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    Sleep apnea     Spasm of sphincter of Oddi     Thyroid nodule 11/01/2016       Medications   acetaminophen (TYLENOL) 325 MG tablet  amoxicillin-clavulanate (AUGMENTIN) 875-125 MG tablet  blood glucose (CONTOUR NEXT TEST) test strip  blood glucose (NO BRAND SPECIFIED) lancets standard  calcium carbonate 600 mg-vitamin D 400 units (CALTRATE) 600-400 MG-UNIT per tablet  Calcium Carbonate-Vitamin D (CALTRATE 600+D PO)  Continuous Glucose Sensor (DEXCOM G6 SENSOR) MISC  Continuous Glucose Transmitter (DEXCOM G6  TRANSMITTER) MISC  DULoxetine (CYMBALTA) 20 MG capsule  escitalopram (LEXAPRO) 20 MG tablet  famotidine (PEPCID) 40 MG tablet  FIASP 100 UNIT/ML SOLN  FIASP PENFILL 100 UNIT/ML SOCT  fluconazole (DIFLUCAN) 150 MG tablet  gabapentin (NEURONTIN) 300 MG capsule  Glucagon (GVOKE HYPOPEN 1-PACK) 1 MG/0.2ML SOAJ  hydrOXYzine (ATARAX) 25 MG tablet  Insulin Disposable Pump (OMNIPOD 5 G6 POD, GEN 5,) MISC  insulin glargine (LANTUS PEN) 100 UNIT/ML pen  INSULIN PUMP - OUTPATIENT  levothyroxine (SYNTHROID/LEVOTHROID) 125 MCG tablet  lipase-protease-amylase (VIOKACE) 74257-92398-90409 units TABS tablet  loratadine (CLARITIN) 10 MG tablet  Nutritional Supplements (BOOST HIGH PROTEIN) LIQD  omeprazole (PRILOSEC) 40 MG DR capsule  ondansetron (ZOFRAN ODT) 4 MG ODT tab  potassium citrate (UROCIT-K) 10 MEQ (1080 MG) CR tablet  traZODone (DESYREL) 50 MG tablet        Surgical History   Past Surgical History:   Procedure Laterality Date    ABDOMINOPLASTY  2002    Tummy tuck    APPENDECTOMY  1990    BUNIONECTOMY Right 1998    CBD Stent placement  2002    CBD stent; Dr. Presley     SECTION      CHOLECYSTECTOMY      COLONOSCOPY N/A 2021    Procedure: COLONOSCOPY INCOMPLETE Aborted due to incomplete prep  will need to take additional prep and return tomorrow 21;  Surgeon: Ihsan Saenz MD;  Location: [facility]    COMBINED CYSTOSCOPY, RETROGRADES, URETEROSCOPY, LASER HOLMIUM LITHOTRIPSY URETER(S), INSERT STENT Right 2015    Procedure: COMBINED CYSTOSCOPY, RETROGRADES, URETEROSCOPY, LASER HOLMIUM LITHOTRIPSY URETER(S), INSERT STENT;  Surgeon: Kennedi Aldana MD;  Location: [facility]    COMBINED CYSTOSCOPY, RETROGRADES, URETEROSCOPY, LASER HOLMIUM LITHOTRIPSY URETER(S), INSERT STENT Right 2015    Procedure: COMBINED CYSTOSCOPY, RETROGRADES, URETEROSCOPY, LASER HOLMIUM LITHOTRIPSY URETER(S), INSERT STENT;  Surgeon: Kennedi Aldana MD;  Location: [facility]    COSMETIC SURGERY  2002     Tummy tuck    CYSTECTOMY OVARIAN BENIGN Right     CYSTOSCOPY, RETROGRADES, INSERT STENT URETER(S), COMBINED  10/02/2012    Procedure: COMBINED CYSTOSCOPY, RETROGRADES, INSERT STENT URETER(S);  COMBINED CYSTOSCOPY,  , INSERT LEFT STENT URETER;  Surgeon: Johny Baez MD;  Location: [facility]    CYSTOSCOPY, RETROGRADES, INSERT STENT URETER(S), COMBINED Left 9/20/2022    Procedure: Cystoscopy with left retrograde pyelogram, left ureteroscopy, thulium laser lithotripsy of left ureteral calculus, stone basketing and left ureteral stent insertion;  Surgeon: Alonzo Rome MD;  Location: [facility]    ESOPHAGOSCOPY, GASTROSCOPY, DUODENOSCOPY (EGD), COMBINED N/A 01/24/2018    Procedure: COMBINED ESOPHAGOSCOPY, GASTROSCOPY, DUODENOSCOPY (EGD);  ESOPHAGOSCOPY, GASTROSCOPY, DUODENOSCOPY (EGD)    ;  Surgeon: Tamir Rodgers MD;  Location: [facility]    EXTRACORPOREAL SHOCK WAVE LITHOTRIPSY (ESWL)  10/16/2012    Procedure: EXTRACORPOREAL SHOCK WAVE LITHOTRIPSY (ESWL);  left EXTRACORPOREAL SHOCK WAVE LITHOTRIPSY (ESWL) ;  Surgeon: Johny Baez MD;  Location: [facility]    Gastric bypass NOS      HERNIA REPAIR  02/2015    HYSTERECTOMY SUPRACERVICAL, BILATERAL SALPINGO-OOPHORECTOMY, COMBINED N/A 02/01/2022    Procedure: Abdominal supracervical hysterectomy, bilateral salpingooophrectomy;  Surgeon: Nicole Rivera MD;  [facility]    IRRIGATION AND DEBRIDEMENT HAND, COMBINED Left 10/30/2020    Procedure: Left hand sharp excisional debridement of skin, subcutaneous tissue and fat with a scalpel, 2 x 1 x 1 cm.;  Surgeon: Demian Renteria MD;  Location: [facility]    LAPAROSCOPIC LYSIS ADHESIONS N/A 02/20/2015    Procedure: LAPAROSCOPIC LYSIS ADHESIONS;  Surgeon: Aaron Early MD;  [facility]    LAPAROSCOPIC LYSIS ADHESIONS N/A 12/29/2015    Procedure: LAPAROSCOPIC LYSIS ADHESIONS;  Surgeon: Aaron Early MD;  [facility]    PANCREATECTOMY, TRANSPLANT AUTO ISLET CELL, COMBINED  05/10/2013    Procedure: COMBINED  "PANCREATECTOMY, TRANSPLANT AUTO ISLET CELL;  Pancreatectomy, Auto Islet Cell Transplant   hernia repair, jejunostomy tube and liver biopsies with Anesthesia General with block;  Surgeon: Aaron Early MD;  Location[facility]    Partial ileum resection  1992    RECTOPEXY ABDOMINAL N/A 02/01/2022    Procedure: RECTOPEXY, ABDOMINAL;  Surgeon: Uriah Sheridan MD;  [facility]    REPAIR PTOSIS BROW BILATERAL Bilateral 06/09/2020    Procedure: BILATERAL BROW PTOSIS REPAIR;  Surgeon: Denise Alberts MD;  Location: [facility]    SACROCOLPOPEXY, CYSTOSCOPY, COMBINED N/A 02/01/2022    Procedure: Uterosacral ligament suspension, pina colposuspension with Cystoscopy;  Surgeon: Nicole Rivera MD;  [facility]    SIGMOIDOSCOPY FLEXIBLE N/A 02/01/2022    Procedure: SIGMOIDOSCOPY, FLEXIBLE;  Surgeon: Uriah Sheridan MD;  Location: [facility]    Surgery for SBO  2015    TONSILLECTOMY, ADENOIDECTOMY, COMBINED  1997    TRANSPLANT  5/10/13    Pancreatic Auto-Islet Transplant       Physical Exam     Patient Vitals for the past 24 hrs:   BP Temp Temp src Pulse Resp SpO2 Height Weight   11/01/24 0500 -- -- -- -- -- 99 % -- --   11/01/24 0446 132/62 98.1  F (36.7  C) Oral 67 18 -- -- --   11/01/24 0234 (!) 143/88 -- -- -- -- -- -- --   11/01/24 0233 -- 98  F (36.7  C) Temporal 105 20 98 % 1.626 m (5' 4\") 68.6 kg (151 lb 3.8 oz)     Physical Exam  Constitutional:       Appearance: Normal appearance.      General: Not in acute distress.  HENT:      Head: Normocephalic and atraumatic.   Eyes:      Extraocular Movements: Extraocular movements intact.      Conjunctiva/sclera: Conjunctivae normal.   Cardiovascular:      Rate and Rhythm: Normal rate and regular rhythm.   Pulmonary:      Effort: Pulmonary effort is normal. No respiratory distress.   Abdominal:      General: Abdomen is flat. There is no distension.   Musculoskeletal:         General: No swelling or deformity.      Cervical back: Normal range of " motion. No rigidity.      Left hand: Generalized tenderness to palpation of the left index finger.  Circumferential swelling of the left index finger with faint erythema in the dorsal and palmar aspect of digit.  Unable to flex digit at PIP due to pain and swelling.  Serosanguineous drainage from puncture wounds on the dorsal and palmar finger.  Tenderness to palpation to the extensor and flexor surfaces of the digit.  Cap refill less than 2 seconds in distal fingertips.  Skin:     Coloration: Skin is not jaundiced or pale.   Neurological:      General: No focal deficit present.      Mental Status: Alert and oriented to person, place, and time.   Psychiatric:         Mood and Affect: Mood normal.         Behavior: Behavior normal.    Diagnostics     Lab Results   Labs Ordered and Resulted from Time of ED Arrival to Time of ED Departure   BASIC METABOLIC PANEL - Abnormal       Result Value    Sodium 142      Potassium 3.8      Chloride 105      Carbon Dioxide (CO2) 24      Anion Gap 13      Urea Nitrogen 22.2      Creatinine 0.96 (*)     GFR Estimate 67      Calcium 8.9      Glucose 74     CBC WITH PLATELETS AND DIFFERENTIAL - Abnormal    WBC Count 10.4      RBC Count 4.10      Hemoglobin 12.0      Hematocrit 38.3      MCV 93      MCH 29.3      MCHC 31.3 (*)     RDW 14.8      Platelet Count 349      % Neutrophils 61      % Lymphocytes 24      % Monocytes 12      % Eosinophils 2      % Basophils 1      % Immature Granulocytes 0      NRBCs per 100 WBC 0      Absolute Neutrophils 6.4      Absolute Lymphocytes 2.5      Absolute Monocytes 1.2      Absolute Eosinophils 0.2      Absolute Basophils 0.1      Absolute Immature Granulocytes 0.0      Absolute NRBCs 0.0     ISTAT GASES LACTATE VENOUS POCT - Abnormal    Lactic Acid POCT <0.3      Bicarbonate Venous POCT 26      O2 Sat, Venous POCT 17 (*)     pCO2 Venous POCT 54 (*)     pH Venous POCT 7.30 (*)     pO2 Venous POCT 16 (*)    CRP INFLAMMATION - Normal    CRP  Inflammation <3.00     ERYTHROCYTE SEDIMENTATION RATE AUTO - Normal    Erythrocyte Sedimentation Rate 10     GLUCOSE MONITOR NURSING POCT   BLOOD CULTURE       Imaging   No orders to display       EKG   None    Independent Interpretation   None    ED Course      Medications Administered   Medications   HYDROmorphone (DILAUDID) injection 0.2 mg (0.2 mg Intravenous $Given 11/1/24 7427)   HYDROmorphone (DILAUDID) injection 0.4 mg (has no administration in time range)   ampicillin-sulbactam (UNASYN) 3 g vial to attach to  mL bag (0 g Intravenous Stopped 11/1/24 3938)       Procedures   Procedures     Discussion of Management   See ED course    ED Course   ED Course as of 11/01/24 0640   Fri Nov 01, 2024 0338 Dr. Nnamdi Ahumada accepted patient for admission.       Additional Documentation  None    Medical Decision Making / Diagnosis     CMS Diagnoses: IV Antibiotics given and/or elevated Lactate of 0.27 and no sepsis note found - Delete this reminder and enter the sepsis note or '.edcms' before signing chart.>>>The patient has signs of sepsis   Sepsis ED evaluation   The patient has signs of sepsis as evidenced by:  1. Presence of 2 SIRS criteria, suspected infection, AND  2. Organ dysfunction:  Infection    Time zero:  0336  on 11/01/24 as this was the time when  I examined the finger resulted, raising suspicion for bacterial infection.    Note: Due to a national blood culture bottle shortage, reduced blood cultures may have been drawn on this patient.    Lactic Acid Results:  Recent Labs   Lab Test 11/01/24  0328 03/03/24  2200 06/30/23 2019   LACT <0.3 1.6 1.4       3 Hour Bundle 6 Hour Bundle (Reassessment)   Blood Cultures before IV Antibiotics: Yes  Antibiotics given: see below  Prehospital fluid volume (mL):                     Total fluids given (ED +Pre-hospital):  The patient responded to a lesser volume of IV fluids. The initial volume ordered was 100 mL.    Repeat Lactic Acid Level: Ordered by reflex  for 2 hours after initial lactic acid collection.  Vasopressors: MAP>65 after initial IVF bolus, will continue to monitor fluid status and vital signs.  Repeat perfusion exam: I attest to having performed a repeat sepsis exam and assessment of perfusion at  0338 .   BMI Readings from Last 1 Encounters:   11/01/24 25.96 kg/m        Anti-infectives (From admission through now)      Start     Dose/Rate Route Frequency Ordered Stop    11/01/24 0340  ampicillin-sulbactam (UNASYN) 3 g vial to attach to  mL bag         3 g  over 15-30 Minutes Intravenous EVERY 6 HOURS 11/01/24 0336 11/01/24 0413                MIPS       None    MDM   Lynn Thompson is a 59 year old female as described above presents to the emergency department for worsening left index finger pain and swelling after dog bite injury yesterday morning for which patient was evaluated in the ED and given IV antibiotics and discharged home on p.o. antibiotics.  Patient hemodynamically stable at time of evaluation.  Afebrile.  Examination of the left index finger certainly concerning for potential developing infectious tenosynovitis especially given generalized swelling of the digit and tenderness to palpation of the flexor and extensor service, there is also potential concern for underlying deep space abscess development especially given the swelling at the MCP joint.  On chart review of plain film imaging obtained on 10/31/2024, there is a 3 mm radiodensity similar to that of bone along the radial aspect of the index finger which certainly could represent a small foreign body versus interval heterotropic ossification.  Patient will most likely require hand surgery consultation while inpatient for potential consideration of I&D/washout.  Patient was reinitiated on IV antibiotics and hospitalist service was consulted for admission.  Discussed care plan with patient who voiced understanding and agreement with plan.  Answered all questions.  Additional  workup and orders as listed in chart.    Please refer to ED course above as part of continuation of MDM for details on the patient's treatment course and any potential changes or updates beyond my initial evaluation and MDM creation.    Disposition   The patient was admitted to the hospital.     Diagnosis     ICD-10-CM    1. Open wound of left index finger due to dog bite  S61.251A     W54.0XXA       2. Infectious tenosynovitis  M65.10       3. Cellulitis of left index finger  L03.012            Discharge Medications   New Prescriptions    No medications on file         DO Tc SHAFFER Ferris, DO  11/01/24 0643

## 2024-11-01 NOTE — CONSULTS
[facility]  Orthopaedics/Foot and Ankle Surgery Consultation         Rogerio Hernandez MD    Lynn Thompson MRN# [id number]   YOB: 1965 Age: 59 year old      Date of Admission:  11/1/2024  Date of Consult: 11/01/2024           Assessment and Plan:   59-year-old female with history of diabetes, status post pancreatectomy, distal gastrectomy, splenectomy, and other GI procedures for chronic pancreatitis.  The patient sustained a dog bite to her left index finger yesterday morning, and has developed increased swelling and pain throughout the second finger over the past 24 hours.  Physical examination is concerning for tenosynovitis and worsening soft tissue infection, and operative intervention is required, particularly given the patient's immunocompromised status and potential retained foreign body.  Hospitalist comanagement is appreciated.  Agree with the current IV antibiotic plan.  The patient's tetanus is up-to-date, and her dog was up-to-date on vaccinations.    I discussed the case with Dr. Reza who is on for hand call this weekend, and will be planning to take the patient back to the operating room first thing tomorrow morning.  Agree with the current pain regimen and the patient may have a diet today, with a plan to be n.p.o. after midnight tonight.  The orthopedic trauma team will continue to monitor the patient's care while she remains in house.            Code Status:   Full Code         Primary Care Physician:   Maryana Brooks [phone]         Requesting Physician:      ED staff.         Chief Complaint:   Left index finger dog bite.    History is obtained from the patient and medical chart.         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  The patient also recalls a previous history  of a dog bite around her left hand, which required operative intervention for debridement a number of years ago.  The patient was breaking up a fight between her cat and dog yesterday morning, when her dog bit her around the base of her left index finger.  The patient has developed increased swelling and pain throughout the index finger over the past day, and presented to the emergency department last evening for evaluation.  The patient denies any systemic signs of infection.  The patient notes significant difficulty and pain with any active motion of the index finger, though denies significant pain throughout the remainder of the hand.  The patient denies significant numbness or tingling distally throughout the index finger since the injury.           Past Medical History:     Patient Active Problem List   Diagnosis    Iron deficiency anemia secondary to inadequate dietary iron intake    Gastric bypass status for obesity    Chronic pain syndrome    Exocrine pancreatic insufficiency    Asplenia    BLU (obstructive sleep apnea)    H/O of rectopexy    Dysthymia    Anxiety    Thyroid nodule    Acquired hypothyroidism    Post-pancreatectomy diabetes (H)    Other iron deficiency anemia    Urge incontinence    Urinary urgency    High-tone pelvic floor dysfunction    Pyuria    Recurrent kidney stones    Vaginal atrophy    Enteric hyperoxaluria    Hypocitraturia    Acute sepsis (H)    S/P partial hysterectomy    Generalized abdominal pain    Diarrhea, unspecified type    Infectious tenosynovitis    Cellulitis of left index finger    Open wound of left index finger due to dog bite      Past Medical History:   Diagnosis Date    Benign paroxysmal positional vertigo     occ.     Calculus of kidney 05/2005    x1 on L side passed, several stones.  Has been tested for oxalate.    Chronic abdominal pain 07/17/2013    Chronic pancreatitis 07/17/2013    Depression     also occ panic spells    Diabetes (H) 5/10/2013    Total  Pancreatomy with Auto Islets Transplant    Dyspepsia 1999    H. pylori   treated    Dysthymia 2016    Headaches     still periodic HA's ;  often 5X/week    Hypertension 2016    Stress related    Iron deficiency anemia 2003    relates to gastric bypass    Post-pancreatectomy diabetes melltius 2013    Sleep apnea     uses splint    Spasm of sphincter of Oddi     surgical + endoscopic stenting of pancreatic duct @ [facility] [place] 06    Thyroid nodule 2016             Past Surgical History:     Past Surgical History:   Procedure Laterality Date    ABDOMINOPLASTY  2002    Tummy tuck    APPENDECTOMY  1990    BUNIONECTOMY Right 1998    CBD Stent placement  2002    CBD stent; Dr. Presley     SECTION      CHOLECYSTECTOMY      COLONOSCOPY N/A 2021    Procedure: COLONOSCOPY INCOMPLETE Aborted due to incomplete prep  will need to take additional prep and return tomorrow 21;  Surgeon: Ihsan Saenz MD;  Location: [facility]    COMBINED CYSTOSCOPY, RETROGRADES, URETEROSCOPY, LASER HOLMIUM LITHOTRIPSY URETER(S), INSERT STENT Right 2015    Procedure: COMBINED CYSTOSCOPY, RETROGRADES, URETEROSCOPY, LASER HOLMIUM LITHOTRIPSY URETER(S), INSERT STENT;  Surgeon: Kennedi Aldana MD;  Location: [facility]    COMBINED CYSTOSCOPY, RETROGRADES, URETEROSCOPY, LASER HOLMIUM LITHOTRIPSY URETER(S), INSERT STENT Right 2015    Procedure: COMBINED CYSTOSCOPY, RETROGRADES, URETEROSCOPY, LASER HOLMIUM LITHOTRIPSY URETER(S), INSERT STENT;  Surgeon: Kennedi Aldana MD;  Location: [facility]    COSMETIC SURGERY  2002    [facility]    CYSTECTOMY OVARIAN BENIGN Right     CYSTOSCOPY, RETROGRADES, INSERT STENT URETER(S), COMBINED  10/02/2012    Procedure: COMBINED CYSTOSCOPY, RETROGRADES, INSERT STENT URETER(S);  COMBINED CYSTOSCOPY,  , INSERT LEFT STENT URETER;  Surgeon: Johny Baez MD;  Location: [facility]    CYSTOSCOPY, RETROGRADES, INSERT STENT URETER(S), COMBINED Left  9/20/2022    Procedure: Cystoscopy with left retrograde pyelogram, left ureteroscopy, thulium laser lithotripsy of left ureteral calculus, stone basketing and left ureteral stent insertion;  Surgeon: Alonzo Rome MD;  Location: [facility]    ESOPHAGOSCOPY, GASTROSCOPY, DUODENOSCOPY (EGD), COMBINED N/A 01/24/2018    Procedure: COMBINED ESOPHAGOSCOPY, GASTROSCOPY, DUODENOSCOPY (EGD);  ESOPHAGOSCOPY, GASTROSCOPY, DUODENOSCOPY (EGD)    ;  Surgeon: Tamir Rodgers MD;  Location: [facility]    EXTRACORPOREAL SHOCK WAVE LITHOTRIPSY (ESWL)  10/16/2012    Procedure: EXTRACORPOREAL SHOCK WAVE LITHOTRIPSY (ESWL);  left EXTRACORPOREAL SHOCK WAVE LITHOTRIPSY (ESWL) ;  Surgeon: Johny Baez MD;  Location: [facility]    Gastric bypass NOS      HERNIA REPAIR  02/2015    HYSTERECTOMY SUPRACERVICAL, BILATERAL SALPINGO-OOPHORECTOMY, COMBINED N/A 02/01/2022    Procedure: Abdominal supracervical hysterectomy, bilateral salpingooophrectomy;  Surgeon: Nicole Rivera MD;  [facility]    IRRIGATION AND DEBRIDEMENT HAND, COMBINED Left 10/30/2020    Procedure: Left hand sharp excisional debridement of skin, subcutaneous tissue and fat with a scalpel, 2 x 1 x 1 cm.;  Surgeon: Demian Renteria MD;  Location: [facility]    LAPAROSCOPIC LYSIS ADHESIONS N/A 02/20/2015    Procedure: LAPAROSCOPIC LYSIS ADHESIONS;  Surgeon: Aaron Early MD;  [facility]    LAPAROSCOPIC LYSIS ADHESIONS N/A 12/29/2015    Procedure: LAPAROSCOPIC LYSIS ADHESIONS;  Surgeon: Aaron Early MD;  [facility]    PANCREATECTOMY, TRANSPLANT AUTO ISLET CELL, COMBINED  05/10/2013    Procedure: COMBINED PANCREATECTOMY, TRANSPLANT AUTO ISLET CELL;  Pancreatectomy, Auto Islet Cell Transplant   hernia repair, jejunostomy tube and liver biopsies with Anesthesia General with block;  Surgeon: Aaron Early MD;  [facility]    Partial ileum resection  1992    RECTOPEXY ABDOMINAL N/A 02/01/2022    Procedure: RECTOPEXY, ABDOMINAL;  Surgeon: Uriah Sheridan  MD Keegan;  [facility]    REPAIR PTOSIS BROW BILATERAL Bilateral 06/09/2020    Procedure: BILATERAL BROW PTOSIS REPAIR;  Surgeon: Denise Alberts MD;  Location: [facility]    SACROCOLPOPEXY, CYSTOSCOPY, COMBINED N/A 02/01/2022    Procedure: Uterosacral ligament suspension, pina colposuspension with Cystoscopy;  Surgeon: Nicole Rivera MD;  [facility]    SIGMOIDOSCOPY FLEXIBLE N/A 02/01/2022    Procedure: SIGMOIDOSCOPY, FLEXIBLE;  Surgeon: Uriah Sheridan MD;  [facility]    Surgery for SBO  2015    TONSILLECTOMY, ADENOIDECTOMY, COMBINED  1997    TRANSPLANT  5/10/13    Pancreatic Auto-Islet Transplant            Home Medications:     Prior to Admission medications    Medication Sig Last Dose Taking? Auth Provider Long Term End Date   acetaminophen (TYLENOL) 325 MG tablet Take 2 tablets (650 mg) by mouth every 4 hours as needed for mild pain or other (and adjunct with moderate or severe pain or per patient request).  Patient taking differently: Take 325 mg by mouth every 4 hours as needed for mild pain or other (and adjunct with moderate or severe pain or per patient request).  Yes Adelfo Castle APRN CNP     amoxicillin-clavulanate (AUGMENTIN) 875-125 MG tablet Take 1 tablet by mouth 2 times daily for 7 days. 10/31/2024 Evening Yes Erlinda Cooley PA-C No 11/7/24   blood glucose (CONTOUR NEXT TEST) test strip Use to test blood sugar 4-6 times daily or as directed.  Yes Adriana Robles MD No    blood glucose (NO BRAND SPECIFIED) lancets standard Use to test blood sugar 4-6 times daily or as directed.  Yes Adriana Robles MD     calcium carbonate 600 mg-vitamin D 400 units (CALTRATE) 600-400 MG-UNIT per tablet Take 2 tablets by mouth 2 times daily. 10/31/2024 Evening Yes Unknown, Entered By History     Continuous Glucose Sensor (DEXCOM G6 SENSOR) MISC CHANGE EVERY 10 DAYS  Yes Adriana Robles MD     Continuous Glucose Transmitter (DEXCOM G6 TRANSMITTER) MISC CHANGE  EVERY 90 DAYS  Yes Adriana Robles MD     cyclobenzaprine (FLEXERIL) 10 MG tablet Take 10 mg by mouth 3 times daily as needed for muscle spasms. 10/30/2024 Yes Unknown, Entered By History     DULoxetine (CYMBALTA) 20 MG capsule Take 20 mg by mouth daily 10/31/2024 Morning Yes Unknown, Entered By History No    escitalopram (LEXAPRO) 20 MG tablet Take 20 mg by mouth daily 10/31/2024 Morning Yes Unknown, Entered By History No    famotidine (PEPCID) 40 MG tablet Take 40 mg by mouth 2 times daily. 10/31/2024 Evening Yes Unknown, Entered By History     FIASP 100 UNIT/ML SOLN INJECT 15 UNITS UNDER THE SKIN VIA INSULIN PUMP. MAY USE UP TO 85 UNITS DAILY 10/31/2024 Evening Yes Adriana Robles MD     fluconazole (DIFLUCAN) 150 MG tablet Take one tablet now, and one tablet in three days  Yes Erlinda Cooley PA-C  11/3/24   gabapentin (NEURONTIN) 300 MG capsule 1 capsule BID.  If still having pain can increase to 1 capsule TID. 10/31/2024 Evening Yes Adelfo Castle, MITZY CNP Yes    Glucagon (GVOKE HYPOPEN 1-PACK) 1 MG/0.2ML SOAJ Inject 1 mg Subcutaneous once as needed (for hypoglycemia with loss of consciousness)  Yes Adriana Robles MD Yes    hydrOXYzine (ATARAX) 25 MG tablet Take 25 mg by mouth daily as needed.  Yes Reported, Patient     Insulin Disposable Pump (OMNIPOD 5 G6 POD, GEN 5,) MISC CHANGE EVERY 3 DAYS  Yes Adriana Robles MD     insulin glargine (LANTUS PEN) 100 UNIT/ML pen Inject 6 units daily in the event of pump failure  Yes Adriana Robles MD Yes    INSULIN PUMP - OUTPATIENT Inject subcutaneously continuously. Date Last Updated on chart: 2024  Omnipod 5, type of insulin: Fiasp  Change site every 3 days  Basal Rates in units/hr  5226-3942: 0.25 unit/hr  1020-8521: 0.5 units/hr  ICF (insulin to carb ratio): 10 (1 unit covers 10g carbs)  ISF (insulin sensitivity factor): 50 (1 unit lowers BG by 50mg/dL)  Target B  Active insulin time: 4 hrs  Yes Unknown, Entered By History      levothyroxine (SYNTHROID/LEVOTHROID) 125 MCG tablet Take 1 tablet (125 mcg) by mouth daily 10/31/2024 Morning Yes Adriana Robles MD Yes    lipase-protease-amylase (VIOKACE) 83227-96459-96653 units TABS tablet Take 6 with meals and 3 with snacks; approximately daily maximum of 20 capsules 10/31/2024 Evening Yes Adriana Robles MD No    loratadine (CLARITIN) 10 MG tablet Take 10 mg by mouth daily. 10/31/2024 Morning Yes Unknown, Entered By History     Nutritional Supplements (BOOST HIGH PROTEIN) LIQD After above baseline labs are drawn, give: 6 mL/kg to maximum of 360 mL; the beverage is to be consumed within 5 minutes.  Yes Adriana Robles MD     omeprazole (PRILOSEC) 40 MG DR capsule Take 1 capsule (40 mg) by mouth 2 times daily Take 30-60 minutes before a meal. 10/31/2024 Evening Yes Maryana Brooks MD No    ondansetron (ZOFRAN ODT) 4 MG ODT tab Take 1 tablet (4 mg) by mouth every 6 hours as needed for nausea or vomiting. Past Month Yes Adelfo Castle, APRBERENICE CNP No    potassium citrate (UROCIT-K) 10 MEQ (1080 MG) CR tablet Take 10 mEq by mouth daily. 10/31/2024 Morning Yes Unknown, Entered By History     traZODone (DESYREL) 100 MG tablet Take 100 mg by mouth at bedtime. 10/30/2024 Bedtime Yes Unknown, Entered By History No             Current Medications:         Current Facility-Administered Medications   Medication Dose Route Frequency Provider Last Rate Last Admin    acetaminophen (TYLENOL) tablet 1,000 mg  1,000 mg Oral Q8H Andrew Fowler MD   1,000 mg at 11/01/24 1300    ampicillin-sulbactam (UNASYN) 3 g vial to attach to  mL bag  3 g Intravenous Q6H Andrew Fowler MD   3 g at 11/01/24 1615    DULoxetine (CYMBALTA) DR capsule 20 mg  20 mg Oral Daily Nnamdi Ahumada MD   20 mg at 11/01/24 1018    escitalopram (LEXAPRO) tablet 20 mg  20 mg Oral Daily Nnamdi Ahumada MD   20 mg at 11/01/24 1018    famotidine (PEPCID) tablet 40 mg  40 mg Oral BID Nnamdi Ahumada,  MD   40 mg at 11/01/24 1018    gabapentin (NEURONTIN) capsule 300 mg  300 mg Oral BID Nnamdi Ahumada MD   300 mg at 11/01/24 1018    insulin aspart (NovoLOG/FIASP) 100 UNIT/ML VIAL FOR FILLING PUMP RESERVOIR   Device See Admin Instructions Nnamdi Ahumada MD        insulin bolus from AMBULATORY PUMP   Subcutaneous 4x Daily AC & HS Nnamdi Ahumada MD        insulin bolus from AMBULATORY PUMP   Subcutaneous TID AC Nnamdi Ahumada MD        levothyroxine (SYNTHROID/LEVOTHROID) tablet 125 mcg  125 mcg Oral QAM AC Nnamdi Ahumada MD   125 mcg at 11/01/24 0923    lipase-protease-amylase (CREON 36) 30087-080581-884693 units per EC Capsule 3 capsule  3 capsule Oral TID w/meals Nnamdi Ahumada MD        pantoprazole (PROTONIX) EC tablet 40 mg  40 mg Oral BID AC Nnamdi Ahumada MD   40 mg at 11/01/24 1615    potassium citrate (UROCIT-K) CR tablet 10 mEq  10 mEq Oral Daily Nnamdi Ahumada MD   10 mEq at 11/01/24 1100    sodium chloride (PF) 0.9% PF flush 3 mL  3 mL Intracatheter Q8H Nnamdi Ahumada MD   3 mL at 11/01/24 1101     Current Facility-Administered Medications   Medication Dose Route Frequency Provider Last Rate Last Admin    acetaminophen (TYLENOL) tablet 650 mg  650 mg Oral Q4H PRN Nnamdi Ahumada MD        Or    acetaminophen (TYLENOL) Suppository 650 mg  650 mg Rectal Q4H PRN Nnamdi Ahumada MD        calcium carbonate (TUMS) chewable tablet 1,000 mg  1,000 mg Oral 4x Daily PRN Nnamdi Ahumada MD        glucose gel 15-30 g  15-30 g Oral Q15 Min PRN Nnamdi Ahumada MD        Or    dextrose 50 % injection 25-50 mL  25-50 mL Intravenous Q15 Min PRN Nnamdi Ahumada MD        Or    glucagon injection 1 mg  1 mg Subcutaneous Q15 Min PRN Nnamdi Ahumada MD        HYDROmorphone (DILAUDID) injection 0.2 mg  0.2 mg Intravenous Q2H PRN Nnamdi Ahumada MD   0.2 mg at 11/01/24 1307    HYDROmorphone (PF) (DILAUDID)  injection 0.5 mg  0.5 mg Intravenous Q2H PRN Andrew Fowler MD        hydrOXYzine HCl (ATARAX) tablet 25 mg  25 mg Oral Daily PRN Nnamdi Ahumada MD   25 mg at 11/01/24 0539    lidocaine (LMX4) cream   Topical Q1H PRN Nnamdi Ahumada MD        lidocaine 1 % 0.1-1 mL  0.1-1 mL Other Q1H PRN Nnamdi Ahumada MD        lipase-protease-amylase (CREON 36) 16060-598967-354507 units per EC Capsule 2 capsule  2 capsule Oral With Snacks or Supplements Nnamdi Ahumada MD   2 capsule at 11/01/24 1425    loratadine (CLARITIN) tablet 10 mg  10 mg Oral Daily PRN Nnamdi Ahumada MD        melatonin tablet 5 mg  5 mg Oral At Bedtime PRN Nnamdi Ahumada MD        naloxone (NARCAN) injection 0.2 mg  0.2 mg Intravenous Q2 Min PRN Nnamdi Ahumada MD        Or    naloxone (NARCAN) injection 0.4 mg  0.4 mg Intravenous Q2 Min PRN Nnamdi Ahumada MD        Or    naloxone (NARCAN) injection 0.2 mg  0.2 mg Intramuscular Q2 Min PRN Nnamdi Ahumada MD        Or    naloxone (NARCAN) injection 0.4 mg  0.4 mg Intramuscular Q2 Min PRN Nnamdi Ahumada MD        ondansetron (ZOFRAN ODT) ODT tab 4 mg  4 mg Oral Q6H PRN Nnamdi Ahumada MD        Or    ondansetron (ZOFRAN) injection 4 mg  4 mg Intravenous Q6H PRN Nnamdi Ahumada MD        oxyCODONE (ROXICODONE) tablet 5 mg  5 mg Oral Q4H PRN Nnamdi Ahumada MD        oxyCODONE IR (ROXICODONE) half-tab 2.5 mg  2.5 mg Oral Q4H PRN Nnamdi Ahumada MD   2.5 mg at 11/01/24 1425    polyethylene glycol (MIRALAX) Packet 17 g  17 g Oral BID PRN Nnamdi Ahumada MD        prochlorperazine (COMPAZINE) injection 10 mg  10 mg Intravenous Q6H PRN Nnamdi Ahumada MD        Or    prochlorperazine (COMPAZINE) tablet 10 mg  10 mg Oral Q6H PRN Nnamdi Ahumada MD        Or    prochlorperazine (COMPAZINE) suppository 25 mg  25 mg Rectal Q12H PRN Nnamdi Ahumada MD        senna-docusate  (SENOKOT-S/PERICOLACE) 8.6-50 MG per tablet 1 tablet  1 tablet Oral BID PRN Nnamdi Ahumada MD        Or    senna-docusate (SENOKOT-S/PERICOLACE) 8.6-50 MG per tablet 2 tablet  2 tablet Oral BID PRN Nnamdi Ahumada MD        sodium chloride (PF) 0.9% PF flush 3 mL  3 mL Intracatheter q1 min prn Nnamdi Ahumada MD        traZODone (DESYREL) tablet 100 mg  100 mg Oral At Bedtime PRN Nnamdi Ahumada MD                Allergies:     Allergies   Allergen Reactions    Corticosteroids Other (See Comments)     All oral, IV and injectable steroids are contraindicated (unless in life threatening situations) in Islet Auto transplant recipients. They can cause irreversible loss of islet cell function. Please contact patient's transplant care coordinator, Erlinda Multani RN BSN at [phone]/pager: [phone] and endocrinologist prior to administration.      Povidone Iodine Hives     Causes skin to blister    Nsaids      naprosyn = GI upset    Sulfasalazine Nausea and Nausea and Vomiting            Social History:     Social History     Tobacco Use    Smoking status: Never    Smokeless tobacco: Never   Substance Use Topics    Alcohol use: Yes     Comment: once every 4 months             Family History:     Family History   Problem Relation Age of Onset    Family History Negative Mother     Respiratory Father         COPD;  at 69    Genitourinary Problems Father         kidney stones    Substance Abuse Father     Depression Father     Asthma Father     Heart Disease Paternal Grandfather         M.I.    Coronary Artery Disease Paternal Grandfather     Hyperlipidemia Paternal Grandfather     Genitourinary Problems Brother         multiple brothers with kidney stones    Gastrointestinal Disease Maternal Grandmother         undiagnosed 'gut' issues    Coronary Artery Disease Maternal Grandfather     Hyperlipidemia Maternal Grandfather     Cerebrovascular Disease Paternal Grandmother         At  "the age of 103    Anxiety Disorder Paternal Grandmother     Osteoporosis Paternal Grandmother     Anxiety Disorder Son     Anxiety Disorder Daughter     Asthma Daughter     Colon Cancer No family hx of               Review of Systems:   The 10 point Review of Systems is negative other than noted in the HPI            Physical Exam:   Blood pressure 131/47, pulse 63, temperature 99.2  F (37.3  C), temperature source Temporal, resp. rate 18, height 1.626 m (5' 4\"), weight 68.6 kg (151 lb 3.8 oz), last menstrual period 12/19/2013, SpO2 92%, not currently breastfeeding.  151 lbs 3.77 oz    Mild swelling is present over the dorsal greater than palmar aspect of the left hand, without significant erythema extending proximal to the second MCP joint.  Moderate irritability is noted with passive flexion and extension of the MCP joint, and severe pain is noted with any attempted manipulation of the PIP joint of the index finger.  There is moderate irritability proximally in the finger with attempted passive range of motion of the DIP joint of the index finger.  A small bite wound is noted at the dorsal aspect of the index finger PIP joint, with a small wound noted as well along the palmar aspect of the finger proximal to the PIP joint.  Minimal drainage is noted on examination today.  There is no significant irritability with gentle palpation or passive range of motion of the thumb or long finger.  Sensation is grossly intact in all peripheral nerve distributions distally throughout the left hand and index finger.  Fingers are all warm and well-perfused with brisk capillary refill.           Data:   All new lab and imaging data was reviewed.  Radiographs of the left hand obtained in the emergency department last evening were personally reviewed, and do not reveal any evidence of obvious acute fracture.  There is a small calcific density along the radial aspect of the head of the index finger proximal phalanx, consistent with a " possible avulsion fragment, chronic ossification, or foreign body.  Results for orders placed or performed during the hospital encounter of 11/01/24 (from the past 24 hours)   CBC with platelets differential    Narrative    The following orders were created for panel order CBC with platelets differential.  Procedure                               Abnormality         Status                     ---------                               -----------         ------                     CBC with platelets and d...[[id number]]  Abnormal            Final result                 Please view results for these tests on the individual orders.   Basic metabolic panel   Result Value Ref Range    Sodium 142 135 - 145 mmol/L    Potassium 3.8 3.4 - 5.3 mmol/L    Chloride 105 98 - 107 mmol/L    Carbon Dioxide (CO2) 24 22 - 29 mmol/L    Anion Gap 13 7 - 15 mmol/L    Urea Nitrogen 22.2 8.0 - 23.0 mg/dL    Creatinine 0.96 (H) 0.51 - 0.95 mg/dL    GFR Estimate 67 >60 mL/min/1.73m2    Calcium 8.9 8.8 - 10.4 mg/dL    Glucose 74 70 - 99 mg/dL   CRP inflammation   Result Value Ref Range    CRP Inflammation <3.00 <5.00 mg/L   Erythrocyte sedimentation rate auto   Result Value Ref Range    Erythrocyte Sedimentation Rate 10 0 - 30 mm/hr   Extra Tube (Roland Draw)    Narrative    The following orders were created for panel order Extra Tube (Roland Draw).  Procedure                               Abnormality         Status                     ---------                               -----------         ------                     Extra Blue Top Tube[[id number]]                              Final result               Extra Red Top Tube[[id number]]                               Final result                 Please view results for these tests on the individual orders.   CBC with platelets and differential   Result Value Ref Range    WBC Count 10.4 4.0 - 11.0 10e3/uL    RBC Count 4.10 3.80 - 5.20 10e6/uL    Hemoglobin 12.0 11.7 - 15.7 g/dL    Hematocrit 38.3  35.0 - 47.0 %    MCV 93 78 - 100 fL    MCH 29.3 26.5 - 33.0 pg    MCHC 31.3 (L) 31.5 - 36.5 g/dL    RDW 14.8 10.0 - 15.0 %    Platelet Count 349 150 - 450 10e3/uL    % Neutrophils 61 %    % Lymphocytes 24 %    % Monocytes 12 %    % Eosinophils 2 %    % Basophils 1 %    % Immature Granulocytes 0 %    NRBCs per 100 WBC 0 <1 /100    Absolute Neutrophils 6.4 1.6 - 8.3 10e3/uL    Absolute Lymphocytes 2.5 0.8 - 5.3 10e3/uL    Absolute Monocytes 1.2 0.0 - 1.3 10e3/uL    Absolute Eosinophils 0.2 0.0 - 0.7 10e3/uL    Absolute Basophils 0.1 0.0 - 0.2 10e3/uL    Absolute Immature Granulocytes 0.0 <=0.4 10e3/uL    Absolute NRBCs 0.0 10e3/uL   Extra Blue Top Tube   Result Value Ref Range    Hold Specimen JIC    Extra Red Top Tube   Result Value Ref Range    Hold Specimen JIC    iStat Gases (lactate) venous, POCT   Result Value Ref Range    Lactic Acid POCT <0.3 <=2.0 mmol/L    Bicarbonate Venous POCT 26 21 - 28 mmol/L    O2 Sat, Venous POCT 17 (L) 70 - 75 %    pCO2 Venous POCT 54 (H) 40 - 50 mm Hg    pH Venous POCT 7.30 (L) 7.32 - 7.43    pO2 Venous POCT 16 (L) 25 - 47 mm Hg   Glucose by meter   Result Value Ref Range    GLUCOSE BY METER POCT 86 70 - 99 mg/dL   Glucose by meter   Result Value Ref Range    GLUCOSE BY METER POCT 89 70 - 99 mg/dL     *Note: Due to a large number of results and/or encounters for the requested time period, some results have not been displayed. A complete set of results can be found in Results Review.

## 2024-11-01 NOTE — H&P
[facility]  Hospitalist Admission Note  Name: Lynn Thompson    MRN: [id number]  YOB: 1965    Age: 59 year old  Date of admission: 11/1/2024  Primary care provider: Maryana Brooks    Chief Complaint:  dog bite    Assessment and Plan:   Dog bite  Left second finger cellulitis  Possible left second PIP tenosynovitis: She tried to separate her cat and dog who were fighting this morning around 8 AM when her dog bit her on her left second finger and there is a puncture wound over the posterior PIP and the anterior soft tissue.  She received IV Unasyn and was sent home on Augmentin earlier today, but has had significant increase swelling and pain at the PIP now spreading up into the hand consistent with cellulitis and concern for possible tenosynovitis as well.  She cannot flex the PIP joint without severe pain.  X-ray earlier did show a 3 mm radiodensity which could be a bone fragment or foreign body which I would presume would be a dog tooth.  She is updated on tetanus and the dog is up-to-date on vaccinations.  She has had a hand infection from a dog bite in the past requiring surgery some years ago.  Her WBC count is 10.4 and CRP/ESR not yet elevated.  She is afebrile and vital signs are stable.  -Continue IV Unasyn 3 g every 6 hours  -Consult orthopedic team as I suspect she will need an I&D for suspected tenosynovitis and possibility of a retained foreign body  -Keep n.p.o. until seen by orthopedic team  -Acetaminophen for mild pain, oxycodone 2.5 mg for moderate and 5 mg for severe pain every 4 hours as needed, IV Dilaudid 0.2 mg for moderate and 0.4 mg severe pain every 2 hours as needed if cannot take p.o.  -Elevate left hand    IDDM type I  Chronic pancreatitis s/p pancreatectomy and pancreatic islet cell transplant: She had previous pancreatectomy, distal gastrectomy, splenectomy, gastrojejunostomy, limited bowel resection, and jejunojejunostomy for chronic pancreatitis.   She uses an insulin pump Omnipod 5 at home that is currently in place.  She is alert and oriented and capable of managing her own pump while here.  She is very insulin sensitive and does have a tendency to have hypoglycemia per her own account.  She states her pump does make a lot of adjustments for hypo-/hyperglycemia.  -Continue home insulin pump, but monitor for hypoglycemia, will check POC glucose every 4 hours while NPO and she will notify us if her continuous glucose monitor is reading hypoglycemia or significant hyperglycemia  -Hypoglycemia protocol  -Resume Viokase when eating    MDD: Resume PTA duloxetine, escitalopram, and trazodone at bedtime.    GERD: Will use pantoprazole 40 mg twice daily in place of her omeprazole and continue famotidine 40 mg twice daily.    BLU: Resume CPAP if uses at home.    Hypothyroidism: Resume PTA levothyroxine.      DVT Prophylaxis: Pneumatic Compression Devices  Code Status: Full Code  FEN: N.p.o. until seen by orthopedic surgery  Discharge Dispo: Home  Estimated Disch Date / # of Days until Disch: Admit inpatient for cellulitis and suspected tenosynovitis.  Anticipate at least 2-3 night hospitalization for IV antibiotics and possible I&D.      History of Present Illness:  Lynn Thompson is a 59 year old female with PMH including chronic pancreatitis now s/p pancreatectomy with pancreatic islet cell transplant now on insulin pump for DM1, distal gastrectomy, splenectomy, hypothyroidism, GERD, BLU, and nephrolithiasis who presents with finger pain and swelling after a dog bite.  She tried to separate her cat and dog were fighting when her dog bit her left second finger over the PIP less than 24 hours ago and she was earlier seen for this.  She was given IV Unasyn and discharged with oral Augmentin.  X-ray showed possible retained foreign body versus bone fragment.  She returns due to significant increase in swelling and pain of the finger and now the hand as well.  She  "cannot flex at the PIP joint without severe pain.  She is not any fevers, chills, nausea, vomiting.  She did have an infection requiring surgery involving the left hand after dog bite some years ago.  She is up-to-date on tetanus.  The dog is up-to-date on vaccinations and has been acting normally.    History obtained from patient, medical record, and from Dr. Montez in the emergency department.  Blood pressure 143/88, heart rate initially 105, temperature 98  F, oxygen 98% on room air.  Labs show WBC 10.4, hemoglobin 6.2, platelet count 349.  BMP within normal limits except for slightly elevated creatinine at 0.96 which is her baseline.  CRP and ESR not elevated.  Lactic acid is <0.3.       Clinically Significant Risk Factors Present on Admission                           # Overweight: Estimated body mass index is 25.96 kg/m  as calculated from the following:    Height as of this encounter: 1.626 m (5' 4\").    Weight as of this encounter: 68.6 kg (151 lb 3.8 oz).                        Past Medical History reviewed:  Past Medical History:   Diagnosis Date    Benign paroxysmal positional vertigo     occ.     Calculus of kidney 05/2005    x1 on L side passed, several stones.  Has been tested for oxalate.    Chronic abdominal pain 07/17/2013    Chronic pancreatitis 07/17/2013    Depression     also occ panic spells    Diabetes (H) 5/10/2013    Total Pancreatomy with Auto Islets Transplant    Dyspepsia 06/1999    H. 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 Presley     SECTION      CHOLECYSTECTOMY      COLONOSCOPY N/A 2021    Procedure: COLONOSCOPY INCOMPLETE Aborted due to incomplete prep  will need to take additional prep and return tomorrow 21;  Surgeon: Ihsan Saenz MD;  Location: [facility]    COMBINED CYSTOSCOPY, RETROGRADES, URETEROSCOPY, LASER HOLMIUM LITHOTRIPSY URETER(S), INSERT STENT Right 2015    Procedure: COMBINED CYSTOSCOPY, RETROGRADES, URETEROSCOPY, LASER HOLMIUM LITHOTRIPSY URETER(S), INSERT STENT;  Surgeon: Kennedi Aldana MD;  Location: [facility]    COMBINED CYSTOSCOPY, RETROGRADES, URETEROSCOPY, LASER HOLMIUM LITHOTRIPSY URETER(S), INSERT STENT Right 2015    Procedure: COMBINED CYSTOSCOPY, RETROGRADES, URETEROSCOPY, LASER HOLMIUM LITHOTRIPSY URETER(S), INSERT STENT;  Surgeon: Kennedi Aldana MD;  Location: [facility]    COSMETIC SURGERY  2002    Tummy tuck    CYSTECTOMY OVARIAN BENIGN Right     CYSTOSCOPY, RETROGRADES, INSERT STENT URETER(S), COMBINED  10/02/2012    Procedure: COMBINED CYSTOSCOPY, RETROGRADES, INSERT STENT URETER(S);  COMBINED CYSTOSCOPY,  , INSERT LEFT STENT URETER;  Surgeon: Johny Baez MD;  Location: [facility]    CYSTOSCOPY, RETROGRADES, INSERT STENT URETER(S), COMBINED Left 2022    Procedure: Cystoscopy with left retrograde pyelogram, left ureteroscopy, thulium laser lithotripsy of left ureteral calculus, stone basketing and left ureteral stent insertion;  Surgeon: Alonzo Rome MD;  Location: [facility]    ESOPHAGOSCOPY, GASTROSCOPY, DUODENOSCOPY (EGD), COMBINED N/A 2018    Procedure: COMBINED ESOPHAGOSCOPY, GASTROSCOPY, DUODENOSCOPY (EGD);  ESOPHAGOSCOPY, GASTROSCOPY, DUODENOSCOPY (EGD)    ;  Surgeon: Tamir Rodgers MD;  Location:  GI    EXTRACORPOREAL SHOCK WAVE LITHOTRIPSY (ESWL)  10/16/2012    Procedure: EXTRACORPOREAL  SHOCK WAVE LITHOTRIPSY (ESWL);  left EXTRACORPOREAL SHOCK WAVE LITHOTRIPSY (ESWL) ;  Surgeon: Johny Baez MD;  Location: [facility] OR    Gastric bypass NOS      HERNIA REPAIR  02/2015    HYSTERECTOMY SUPRACERVICAL, BILATERAL SALPINGO-OOPHORECTOMY, COMBINED N/A 02/01/2022    Procedure: Abdominal supracervical hysterectomy, bilateral salpingooophrectomy;  Surgeon: Nicole Rivera MD;  [facility]    IRRIGATION AND DEBRIDEMENT HAND, COMBINED Left 10/30/2020    Procedure: Left hand sharp excisional debridement of skin, subcutaneous tissue and fat with a scalpel, 2 x 1 x 1 cm.;  Surgeon: Demian Renteria MD;  Location: [facility]    LAPAROSCOPIC LYSIS ADHESIONS N/A 02/20/2015    Procedure: LAPAROSCOPIC LYSIS ADHESIONS;  Surgeon: Aaron Early MD;  [facility]    LAPAROSCOPIC LYSIS ADHESIONS N/A 12/29/2015    Procedure: LAPAROSCOPIC LYSIS ADHESIONS;  Surgeon: Aaron Early MD;  [facility]    PANCREATECTOMY, TRANSPLANT AUTO ISLET CELL, COMBINED  05/10/2013    Procedure: COMBINED PANCREATECTOMY, TRANSPLANT AUTO ISLET CELL;  Pancreatectomy, Auto Islet Cell Transplant   hernia repair, jejunostomy tube and liver biopsies with Anesthesia General with block;  Surgeon: Aaron Early MD;  [facility]    Partial ileum resection  1992    RECTOPEXY ABDOMINAL N/A 02/01/2022    Procedure: RECTOPEXY, ABDOMINAL;  Surgeon: Uriah Sheridan MD;  [facility]    REPAIR PTOSIS BROW BILATERAL Bilateral 06/09/2020    Procedure: BILATERAL BROW PTOSIS REPAIR;  Surgeon: Denise Alberts MD;  Location: [facility]    SACROCOLPOPEXY, CYSTOSCOPY, COMBINED N/A 02/01/2022    Procedure: Uterosacral ligament suspension, pina colposuspension with Cystoscopy;  Surgeon: Nicole Rivera MD;  [facility]    SIGMOIDOSCOPY FLEXIBLE N/A 02/01/2022    Procedure: SIGMOIDOSCOPY, FLEXIBLE;  Surgeon: Uriah Sheridan MD;  [facility]    Surgery for SBO  2015    TONSILLECTOMY, ADENOIDECTOMY, COMBINED       TRANSPLANT  5/10/13    Pancreatic Auto-Islet Transplant     Social History reviewed:  Social History     Tobacco Use    Smoking status: Never    Smokeless tobacco: Never   Substance Use Topics    Alcohol use: Yes     Comment: once every 4 months     Social History     Social History Narrative    Not on file     Family History reviewed:  Family History   Problem Relation Age of Onset    Family History Negative Mother     Respiratory Father         COPD;  at 69    Genitourinary Problems Father         kidney stones    Substance Abuse Father     Depression Father     Asthma Father     Heart Disease Paternal Grandfather         M.I.    Coronary Artery Disease Paternal Grandfather     Hyperlipidemia Paternal Grandfather     Genitourinary Problems Brother         multiple brothers with kidney stones    Gastrointestinal Disease Maternal Grandmother         undiagnosed 'gut' issues    Coronary Artery Disease Maternal Grandfather     Hyperlipidemia Maternal Grandfather     Cerebrovascular Disease Paternal Grandmother         At the age of 103    Anxiety Disorder Paternal Grandmother     Osteoporosis Paternal Grandmother     Anxiety Disorder Son     Anxiety Disorder Daughter     Asthma Daughter     Colon Cancer No family hx of      Allergies:  Allergies   Allergen Reactions    Corticosteroids Other (See Comments)     All oral, IV and injectable steroids are contraindicated (unless in life threatening situations) in Islet Auto transplant recipients. They can cause irreversible loss of islet cell function. Please contact patient's transplant care coordinator, Erlinda Multani RN BSN at [phone]/pager: [phone] and endocrinologist prior to administration.      Povidone Iodine Hives     Causes skin to blister    Nsaids      naprosyn = GI upset    Sulfasalazine Nausea and Nausea and Vomiting     Medications:  Prior to Admission medications    Medication Sig Last Dose Taking? Auth Provider Long Term End Date    acetaminophen (TYLENOL) 325 MG tablet Take 2 tablets (650 mg) by mouth every 4 hours as needed for mild pain or other (and adjunct with moderate or severe pain or per patient request).   Adelfo Castle APRN CNP     amoxicillin-clavulanate (AUGMENTIN) 875-125 MG tablet Take 1 tablet by mouth 2 times daily for 7 days.   Erlinda Cooley PA-C No 11/7/24   blood glucose (CONTOUR NEXT TEST) test strip Use to test blood sugar 4-6 times daily or as directed.   Adriana Robles MD No    blood glucose (NO BRAND SPECIFIED) lancets standard Use to test blood sugar 4-6 times daily or as directed.   Adriana Robles MD     calcium carbonate 600 mg-vitamin D 400 units (CALTRATE) 600-400 MG-UNIT per tablet Take 1 tablet by mouth every morning.   Unknown, Entered By History     Calcium Carbonate-Vitamin D (CALTRATE 600+D PO) Take 2 tablets by mouth every evening.   Unknown, Entered By History     Continuous Glucose Sensor (DEXCOM G6 SENSOR) MISC CHANGE EVERY 10 DAYS   Adriana Robles MD     Continuous Glucose Transmitter (DEXCOM G6 TRANSMITTER) MISC CHANGE EVERY 90 DAYS   Adriana Robles MD     DULoxetine (CYMBALTA) 20 MG capsule Take 20 mg by mouth daily   Unknown, Entered By History No    escitalopram (LEXAPRO) 20 MG tablet Take 20 mg by mouth daily   Unknown, Entered By History No    famotidine (PEPCID) 40 MG tablet Take 40 mg by mouth 2 times daily.   Unknown, Entered By History     FIASP 100 UNIT/ML SOLN INJECT 15 UNITS UNDER THE SKIN VIA INSULIN PUMP. MAY USE UP TO 85 UNITS DAILY   Adriana Robles MD     FIASP PENFILL 100 UNIT/ML SOCT Inject 0.5-4 Units Subcutaneous 4 times daily, INJECT with meals and nightly   Adriana Robles MD     fluconazole (DIFLUCAN) 150 MG tablet Take one tablet now, and one tablet in three days   Erlinda Cooley PA-C  11/3/24   gabapentin (NEURONTIN) 300 MG capsule 1 capsule BID.  If still having pain can increase to 1 capsule TID.   Adelfo Castle APRN CNP Yes     Glucagon (GVOKE HYPOPEN 1-PACK) 1 MG/0.2ML SOAJ Inject 1 mg Subcutaneous once as needed (for hypoglycemia with loss of consciousness)   Adriana Robles MD Yes    hydrOXYzine (ATARAX) 25 MG tablet Take 25 mg by mouth daily as needed.   Reported, Patient     Insulin Disposable Pump (OMNIPOD 5 G6 POD, GEN 5,) MISC CHANGE EVERY 3 DAYS   Adriana Robles MD     insulin glargine (LANTUS PEN) 100 UNIT/ML pen Inject 6 units daily in the event of pump failure   Adriana Robles MD Yes    INSULIN PUMP - OUTPATIENT Inject Subcutaneous continuous Date Last Updated on chart: 3/4/2024  Omnipod 5, type of insulin: Fiasp  Change site every 3 days  Basal Rates in units/hr  1905-4968: 1 unit/hr  ICF (insulin to carb ratio): 12 (1 unit covers 12g carbs)  ISF (insulin sensitivity factor): 140-170 (1 unit lowers BG by 140-170mg/dL)- very sernsitive  Target BG: Upper: 180, lower: 70   Unknown, Entered By History     levothyroxine (SYNTHROID/LEVOTHROID) 125 MCG tablet Take 1 tablet (125 mcg) by mouth daily   Adriana Robles MD Yes    lipase-protease-amylase (VIOKACE) 73318-27[address]-88301 units TABS tablet Take 6 with meals and 3 with snacks; approximately daily maximum of 20 capsules   Adriana Robles MD No    loratadine (CLARITIN) 10 MG tablet Take 10 mg by mouth daily as needed    Unknown, Entered By History     Nutritional Supplements (BOOST HIGH PROTEIN) LIQD After above baseline labs are drawn, give: 6 mL/kg to maximum of 360 mL; the beverage is to be consumed within 5 minutes.   Adriana Robles MD     omeprazole (PRILOSEC) 40 MG DR capsule Take 1 capsule (40 mg) by mouth 2 times daily Take 30-60 minutes before a meal.   Maryana Brooks MD No    ondansetron (ZOFRAN ODT) 4 MG ODT tab Take 1 tablet (4 mg) by mouth every 6 hours as needed for nausea or vomiting.   Adelfo Castle, APRN CNP No    potassium citrate (UROCIT-K) 10 MEQ (1080 MG) CR tablet Take 10 mEq by mouth 2 times daily (with meals)   Unknown, Entered By  "History     traZODone (DESYREL) 50 MG tablet Take 100 mg by mouth at bedtime.   Unknown, Entered By History No      Review of Systems:  A Comprehensive greater than 10 system review of systems was carried out.  Pertinent positives and negatives are noted above.  Otherwise negative.     Physical Exam:  Blood pressure (!) 143/88, pulse 105, temperature 98  F (36.7  C), temperature source Temporal, resp. rate 20, height 1.626 m (5' 4\"), weight 68.6 kg (151 lb 3.8 oz), last menstrual period 12/19/2013, SpO2 98%, not currently breastfeeding.  Wt Readings from Last 1 Encounters:   11/01/24 68.6 kg (151 lb 3.8 oz)     Exam:  Constitutional: Awake, NAD  Eyes: sclera white  HEENT: MMM  Respiratory: no respiratory distress, lungs cta bilaterally, no crackles or wheeze  Cardiovascular: RRR.  No murmur  GI: non-tender, not distended, bowel sounds present  Skin/Musculoskeletal/extremities: Puncture wounds to the posterior left second PIP and soft tissue anteriorly.  Serial sanguinous drainage from the PIP puncture wound.  Significant swelling of the left second finger and swelling of the dorsal hand.  Severe pain with attempts at flexion of the PIP joint.  Some ecchymoses in the area without significant erythema.  Neurologic: A&O, speech clear, light touch sensation intact in all fingers.  Psychiatric: calm, cooperative, normal affect    Lab and imaging data personally reviewed:  Labs:  Recent Labs   Lab 11/01/24 0328   PHV 7.30*   PO2V 16*   PCO2V 54*   HCO3V 26     Recent Labs   Lab 11/01/24 0327   WBC 10.4   HGB 12.0   HCT 38.3   MCV 93        Recent Labs   Lab 11/01/24 0327      POTASSIUM 3.8   CHLORIDE 105   CO2 24   ANIONGAP 13   GLC 74   BUN 22.2   CR 0.96*   GFRESTIMATED 67   VALARIE 8.9     Recent Labs   Lab 11/01/24 0327   SED 10     Recent Labs   Lab 11/01/24 0328   LACT <0.3     CRP < 3    Imaging:  Recent Results (from the past 24 hours)   Fingers XR, 2-3 views, left    Narrative    XR FINGER LEFT G/E " 2 VIEWS 10/31/2024 11:02 AM     HISTORY: dog bite of left index finger over PIP joint, r/o foreign  body and fracture    COMPARISON: None.       Impression    IMPRESSION:    There is a 3 mm radiodensity similar to that of bone along the radial  aspect of the index finger proximal phalangeal neck which was not  present on 10/29/2020. This could represent a small small foreign body  given the history, however interval heterotopic ossification could  have similar appearance.    Adjacent soft tissue swelling. No definite acute fracture or  dislocation.    JOE OH MD         SYSTEM ID:  [id number]       Nnamdi Ahumada MD  Hospitalist  [facility]

## 2024-11-01 NOTE — ED NOTES
[facility]    ED Boarding Nurse Handoff Addendum Report:    Date/time: 11/1/2024, 1:18 PM    Activity Level: independent    Fall Risk: No    Active Infusions: NA    Current Meds Due: See MAR    Current care needs: Monitor pain    Oxygen requirements (liters/min and/or FiO2): NA    Respiratory status: Room air    Vital signs (within last 30 minutes):    Vitals:    11/01/24 0600 11/01/24 0748 11/01/24 1118 11/01/24 1258   BP: (!) 140/75 (!) 140/78 139/82 (!) 147/76   BP Location:    Right arm   Patient Position:       Cuff Size:       Pulse: 77 77 80 77   Resp:  18 18 18   Temp:  98.8  F (37.1  C) 99  F (37.2  C) 99.5  F (37.5  C)   TempSrc:  Oral Oral Oral   SpO2: 93% 96% 94% 94%   Weight:       Height:           Focused assessment within last 30 minutes:    AOx4, Ind, VSS, delbert SOB, has pain in left index finger radiating to left hand PRN Dilaudid given, redness within marked margins, has insulin pod on right thigh confirmed 0.5 units per hour, NPO, plan for surgery and ID consult.     ED Boarding Nurse name: Nargis Ornelas RN

## 2024-11-01 NOTE — ED TRIAGE NOTES
Pt was bite by her own dog at 0800 yesterday morning to the left index finger. Pt having increased pain and swelling to the finger

## 2024-11-01 NOTE — ED NOTES
"[facility]  ED Nurse Handoff Report    ED Chief complaint: Dog Bite  . ED Diagnosis:   Final diagnoses:   Open wound of left index finger due to dog bite   Infectious tenosynovitis   Cellulitis of left index finger       Allergies:   Allergies   Allergen Reactions    Corticosteroids Other (See Comments)     All oral, IV and injectable steroids are contraindicated (unless in life threatening situations) in Islet Auto transplant recipients. They can cause irreversible loss of islet cell function. Please contact patient's transplant care coordinator, Erlinda Multani RN BSN at [phone]/pager: [phone] and endocrinologist prior to administration.      Povidone Iodine Hives     Causes skin to blister    Nsaids      naprosyn = GI upset    Sulfasalazine Nausea and Nausea and Vomiting       Code Status: Full Code    Activity level - Baseline/Home:  independent.  Activity Level - Current:   independent.   Lift room needed: No.   Bariatric: No   Needed: No   Isolation: No.   Infection: Not Applicable.     Respiratory status: Room air    Vital Signs (within 30 minutes):   Vitals:    11/01/24 0233 11/01/24 0234   BP:  (!) 143/88   Pulse: 105    Resp: 20    Temp: 98  F (36.7  C)    TempSrc: Temporal    SpO2: 98%    Weight: 68.6 kg (151 lb 3.8 oz)    Height: 1.626 m (5' 4\")        Cardiac Rhythm:  ,      Pain level:    Patient confused: No.   Patient Falls Risk: patient and family education.   Elimination Status: Has voided     Patient Report - Initial Complaint: 59 year old female presents to the emergency department for dog bites to the left index finger.  This occurred this morning while patient was attempting to break up a fight between the dog and a cat.  Patient was evaluated this morning at this ED and given single dose of IV Unasyn and discharged home with p.o. antibiotics, but since going home, pain has progressively worsened and patient started developing ascending swelling all " the way up to the first knuckle similar to prior when patient had a deep space hand infection/tenosynovitis requiring surgical procedure to the hand.  Patient denies any fevers.  Up-to-date on tetanus vaccination.  Dog also up-to-date on vaccinations.  Patient unable to bend digit due to pain and swelling.  History of asplenia. .   Focused Assessment: Pain controlled with morphine. Redness and swelling outlined.     Abnormal Results:   Labs Ordered and Resulted from Time of ED Arrival to Time of ED Departure   CBC WITH PLATELETS AND DIFFERENTIAL - Abnormal       Result Value    WBC Count 10.4      RBC Count 4.10      Hemoglobin 12.0      Hematocrit 38.3      MCV 93      MCH 29.3      MCHC 31.3 (*)     RDW 14.8      Platelet Count 349      % Neutrophils 61      % Lymphocytes 24      % Monocytes 12      % Eosinophils 2      % Basophils 1      % Immature Granulocytes 0      NRBCs per 100 WBC 0      Absolute Neutrophils 6.4      Absolute Lymphocytes 2.5      Absolute Monocytes 1.2      Absolute Eosinophils 0.2      Absolute Basophils 0.1      Absolute Immature Granulocytes 0.0      Absolute NRBCs 0.0     ISTAT GASES LACTATE VENOUS POCT - Abnormal    Lactic Acid POCT <0.3      Bicarbonate Venous POCT 26      O2 Sat, Venous POCT 17 (*)     pCO2 Venous POCT 54 (*)     pH Venous POCT 7.30 (*)     pO2 Venous POCT 16 (*)    BASIC METABOLIC PANEL   CRP INFLAMMATION   ERYTHROCYTE SEDIMENTATION RATE AUTO   BLOOD CULTURE        No orders to display       Treatments provided: see mar, notes and flowsheets  Family Comments: family at bedside  OBS brochure/video discussed/provided to patient:  Yes  ED Medications:   Medications   ampicillin-sulbactam (UNASYN) 3 g vial to attach to  mL bag (has no administration in time range)   morphine (PF) injection 4 mg (4 mg Intravenous $Given 11/1/24 0330)   ondansetron (ZOFRAN) injection 4 mg (4 mg Intravenous $Given 11/1/24 0331)       Drips infusing:  No  For the majority of the shift  this patient was Green.   Interventions performed were n/a.    Sepsis treatment initiated: No    Cares/treatment/interventions/medications to be completed following ED care: abx    ED Nurse Name: Etelvina Taylor RN  3:42 AM    RECEIVING UNIT ED HANDOFF REVIEW    Above ED Nurse Handoff Report was reviewed: Yes  Reviewed by: Marissa Tee RN on November 1, 2024 at 12:51 PM   I Elma called the ED to inform them the note was read: Yes

## 2024-11-01 NOTE — PROGRESS NOTES
Brief no charge note  Patient  care assumed , seen and examined by me today. Medical records and history and physical by admitting physician was reviewed.  Care plan was discussed with patient/family.  Please review history and physical from earlier today for details.  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     Current issues  Pain control   Problem list  Dog bite   Cellulitis left 2nd finger and hand   Concern for tenosynovitis   Plan  NPO per hand /ortho surgery   Optimize pain control  ID consult   Continue abx

## 2024-11-01 NOTE — PHARMACY-ADMISSION MEDICATION HISTORY
Pharmacy Intern Admission Medication History    Admission medication history is complete. The information provided in this note is only as accurate as the sources available at the time of the update.    Information Source(s): Patient and CareEverywhere/SureScripts via in-person    Pertinent Information: Patient uses insulin pump, changes cartridge every 3 days and uses 60-70 units in that time. Has only gotten one dose of the augmentin and no doses of the fluconazole yet (both new scripts as of yesterday).    Changes made to PTA medication list:  Added: cyclobenzaprine  Deleted: calcium carbonate-vitamin D (duplicate)  Changed: calcium carbonate-vitamin D (1 tab daily to 2 tabs BID), Fiasp pen fill (0.5-4 units to 0.5-6 units), loratadine (prn to daily), potassium citrate (BID to daily)    Allergies reviewed with patient and updates made in EHR: yes    Medication History Completed By: Anyi Mcadams 11/1/2024 8:40 AM    PTA Med List   Medication Sig Last Dose/Taking    acetaminophen (TYLENOL) 325 MG tablet Take 2 tablets (650 mg) by mouth every 4 hours as needed for mild pain or other (and adjunct with moderate or severe pain or per patient request). (Patient taking differently: Take 325 mg by mouth every 4 hours as needed for mild pain or other (and adjunct with moderate or severe pain or per patient request).) Taking Differently    amoxicillin-clavulanate (AUGMENTIN) 875-125 MG tablet Take 1 tablet by mouth 2 times daily for 7 days. 10/31/2024 Evening    blood glucose (CONTOUR NEXT TEST) test strip Use to test blood sugar 4-6 times daily or as directed. Taking    blood glucose (NO BRAND SPECIFIED) lancets standard Use to test blood sugar 4-6 times daily or as directed. Taking    calcium carbonate 600 mg-vitamin D 400 units (CALTRATE) 600-400 MG-UNIT per tablet Take 2 tablets by mouth 2 times daily. 10/31/2024 Evening    Continuous Glucose Sensor (DEXCOM G6 SENSOR) MISC CHANGE EVERY 10 DAYS Taking    Continuous  Glucose Transmitter (DEXCOM G6 TRANSMITTER) MISC CHANGE EVERY 90 DAYS Taking    cyclobenzaprine (FLEXERIL) 10 MG tablet Take 10 mg by mouth 3 times daily as needed for muscle spasms. 10/30/2024    DULoxetine (CYMBALTA) 20 MG capsule Take 20 mg by mouth daily 10/31/2024 Morning    escitalopram (LEXAPRO) 20 MG tablet Take 20 mg by mouth daily 10/31/2024 Morning    famotidine (PEPCID) 40 MG tablet Take 40 mg by mouth 2 times daily. 10/31/2024 Evening    FIASP 100 UNIT/ML SOLN INJECT 15 UNITS UNDER THE SKIN VIA INSULIN PUMP. MAY USE UP TO 85 UNITS DAILY 10/31/2024 Evening    FIASP PENFILL 100 UNIT/ML SOCT Inject 0.5-4 Units Subcutaneous 4 times daily, INJECT with meals and nightly (Patient taking differently: Takes 0.5-6 units subcutaneous 4 times daily) 10/31/2024 Evening    fluconazole (DIFLUCAN) 150 MG tablet Take one tablet now, and one tablet in three days Taking    gabapentin (NEURONTIN) 300 MG capsule 1 capsule BID.  If still having pain can increase to 1 capsule TID. 10/31/2024 Evening    Glucagon (GVOKE HYPOPEN 1-PACK) 1 MG/0.2ML SOAJ Inject 1 mg Subcutaneous once as needed (for hypoglycemia with loss of consciousness) Taking As Needed    hydrOXYzine (ATARAX) 25 MG tablet Take 25 mg by mouth daily as needed. Taking As Needed    Insulin Disposable Pump (OMNIPOD 5 G6 POD, GEN 5,) MISC CHANGE EVERY 3 DAYS Taking    insulin glargine (LANTUS PEN) 100 UNIT/ML pen Inject 6 units daily in the event of pump failure Taking    INSULIN PUMP - OUTPATIENT Inject Subcutaneous continuous Date Last Updated on chart: 3/4/2024  Omnipod 5, type of insulin: Fiasp  Change site every 3 days  Basal Rates in units/hr  3844-9860: 1 unit/hr  ICF (insulin to carb ratio): 12 (1 unit covers 12g carbs)  ISF (insulin sensitivity factor): 140-170 (1 unit lowers BG by 140-170mg/dL)- very sernsitive  Target BG: Upper: 180, lower: 70 Taking    levothyroxine (SYNTHROID/LEVOTHROID) 125 MCG tablet Take 1 tablet (125 mcg) by mouth daily 10/31/2024  Morning    lipase-protease-amylase (VIOKACE) 65376-52143-37020 units TABS tablet Take 6 with meals and 3 with snacks; approximately daily maximum of 20 capsules 10/31/2024 Evening    loratadine (CLARITIN) 10 MG tablet Take 10 mg by mouth daily. 10/31/2024 Morning    Nutritional Supplements (BOOST HIGH PROTEIN) LIQD After above baseline labs are drawn, give: 6 mL/kg to maximum of 360 mL; the beverage is to be consumed within 5 minutes. Taking    omeprazole (PRILOSEC) 40 MG DR capsule Take 1 capsule (40 mg) by mouth 2 times daily Take 30-60 minutes before a meal. 10/31/2024 Evening    ondansetron (ZOFRAN ODT) 4 MG ODT tab Take 1 tablet (4 mg) by mouth every 6 hours as needed for nausea or vomiting. Past Month    potassium citrate (UROCIT-K) 10 MEQ (1080 MG) CR tablet Take 10 mEq by mouth daily. 10/31/2024 Morning    traZODone (DESYREL) 100 MG tablet Take 100 mg by mouth at bedtime. 10/30/2024 Bedtime

## 2024-11-01 NOTE — PLAN OF CARE
"Arrived to room 627 from ER at 1400 via cart, oriented to room and call system.    Pt A&O x4. PO oxycodone given for pain. CMS intact. Up independently. Voiding. Tolerating CHO diet. Pt managing insulin pump. Plan will be NPO @ midnight for surgery tomorrow.      Problem: Adult Inpatient Plan of Care  Goal: Plan of Care Review  Description: The Plan of Care Review/Shift note should be completed every shift.  The Outcome Evaluation is a brief statement about your assessment that the patient is improving, declining, or no change.  This information will be displayed automatically on your shift  note.  Outcome: Progressing  Flowsheets (Taken 11/1/2024 1756)  Plan of Care Reviewed With: patient  Overall Patient Progress: improving  Goal: Patient-Specific Goal (Individualized)  Description: You can add care plan individualizations to a care plan. Examples of Individualization might be:  \"Parent requests to be called daily at 9am for status\", \"I have a hard time hearing out of my right ear\", or \"Do not touch me to wake me up as it startles  me\".  Outcome: Progressing  Goal: Absence of Hospital-Acquired Illness or Injury  Outcome: Progressing  Intervention: Identify and Manage Fall Risk  Recent Flowsheet Documentation  Taken 11/1/2024 1400 by Marissa Tee RN  Safety Promotion/Fall Prevention:   nonskid shoes/slippers when out of bed   safety round/check completed  Intervention: Prevent Skin Injury  Recent Flowsheet Documentation  Taken 11/1/2024 1400 by Marissa Tee RN  Body Position: position changed independently  Intervention: Prevent and Manage VTE (Venous Thromboembolism) Risk  Recent Flowsheet Documentation  Taken 11/1/2024 1400 by Marissa Tee RN  VTE Prevention/Management: SCDs off (sequential compression devices)  Intervention: Prevent Infection  Recent Flowsheet Documentation  Taken 11/1/2024 1400 by Marissa Tee RN  Infection Prevention: rest/sleep promoted  Goal: Optimal Comfort and " Wellbeing  Outcome: Progressing  Intervention: Monitor Pain and Promote Comfort  Recent Flowsheet Documentation  Taken 11/1/2024 1425 by Marissa Tee RN  Pain Management Interventions: medication (see MAR)  Goal: Readiness for Transition of Care  Outcome: Progressing  Intervention: Mutually Develop Transition Plan  Recent Flowsheet Documentation  Taken 11/1/2024 1400 by Marissa Tee RN  Equipment Currently Used at Home: none     Problem: Skin or Soft Tissue Infection  Goal: Absence of Infection Signs and Symptoms  Outcome: Progressing  Intervention: Minimize and Manage Infection Progression  Recent Flowsheet Documentation  Taken 11/1/2024 1400 by Marissa Tee RN  Infection Prevention: rest/sleep promoted  Infection Management: aseptic technique maintained  Intervention: Provide Meticulous Infection Site Care  Recent Flowsheet Documentation  Taken 11/1/2024 1400 by Marissa Tee RN  Topical Inflammation Care: border recession noted   Goal Outcome Evaluation:      Plan of Care Reviewed With: patient    Overall Patient Progress: improvingOverall Patient Progress: improving

## 2024-11-02 ENCOUNTER — ANESTHESIA (OUTPATIENT)
Dept: SURGERY | Facility: CLINIC | Age: 59
End: 2024-11-02
Payer: COMMERCIAL

## 2024-11-02 LAB
ANION GAP SERPL CALCULATED.3IONS-SCNC: 14 MMOL/L (ref 7–15)
BACTERIA SPEC CULT: NORMAL
BUN SERPL-MCNC: 17.6 MG/DL (ref 8–23)
CALCIUM SERPL-MCNC: 8.7 MG/DL (ref 8.8–10.4)
CHLORIDE SERPL-SCNC: 103 MMOL/L (ref 98–107)
CREAT SERPL-MCNC: 0.78 MG/DL (ref 0.51–0.95)
EGFRCR SERPLBLD CKD-EPI 2021: 86 ML/MIN/1.73M2
ERYTHROCYTE [DISTWIDTH] IN BLOOD BY AUTOMATED COUNT: 14.7 % (ref 10–15)
GLUCOSE BLDC GLUCOMTR-MCNC: 185 MG/DL (ref 70–99)
GLUCOSE BLDC GLUCOMTR-MCNC: 187 MG/DL (ref 70–99)
GLUCOSE BLDC GLUCOMTR-MCNC: 202 MG/DL (ref 70–99)
GLUCOSE BLDC GLUCOMTR-MCNC: 66 MG/DL (ref 70–99)
GLUCOSE BLDC GLUCOMTR-MCNC: 74 MG/DL (ref 70–99)
GLUCOSE BLDC GLUCOMTR-MCNC: 91 MG/DL (ref 70–99)
GLUCOSE SERPL-MCNC: 92 MG/DL (ref 70–99)
GRAM STAIN RESULT: NORMAL
GRAM STAIN RESULT: NORMAL
HCO3 SERPL-SCNC: 23 MMOL/L (ref 22–29)
HCT VFR BLD AUTO: 37.6 % (ref 35–47)
HGB BLD-MCNC: 12.2 G/DL (ref 11.7–15.7)
MCH RBC QN AUTO: 30 PG (ref 26.5–33)
MCHC RBC AUTO-ENTMCNC: 32.4 G/DL (ref 31.5–36.5)
MCV RBC AUTO: 92 FL (ref 78–100)
PLATELET # BLD AUTO: 360 10E3/UL (ref 150–450)
POTASSIUM SERPL-SCNC: 4.9 MMOL/L (ref 3.4–5.3)
RBC # BLD AUTO: 4.07 10E6/UL (ref 3.8–5.2)
SODIUM SERPL-SCNC: 140 MMOL/L (ref 135–145)
WBC # BLD AUTO: 11.2 10E3/UL (ref 4–11)

## 2024-11-02 PROCEDURE — 99232 SBSQ HOSP IP/OBS MODERATE 35: CPT | Performed by: INTERNAL MEDICINE

## 2024-11-02 PROCEDURE — 250N000009 HC RX 250: Performed by: ORTHOPAEDIC SURGERY

## 2024-11-02 PROCEDURE — 82947 ASSAY GLUCOSE BLOOD QUANT: CPT | Performed by: INTERNAL MEDICINE

## 2024-11-02 PROCEDURE — 250N000011 HC RX IP 250 OP 636: Performed by: INTERNAL MEDICINE

## 2024-11-02 PROCEDURE — 250N000013 HC RX MED GY IP 250 OP 250 PS 637: Performed by: ANESTHESIOLOGY

## 2024-11-02 PROCEDURE — 250N000011 HC RX IP 250 OP 636: Performed by: ORTHOPAEDIC SURGERY

## 2024-11-02 PROCEDURE — 250N000013 HC RX MED GY IP 250 OP 250 PS 637: Performed by: STUDENT IN AN ORGANIZED HEALTH CARE EDUCATION/TRAINING PROGRAM

## 2024-11-02 PROCEDURE — 250N000013 HC RX MED GY IP 250 OP 250 PS 637: Performed by: INTERNAL MEDICINE

## 2024-11-02 PROCEDURE — 36415 COLL VENOUS BLD VENIPUNCTURE: CPT | Performed by: INTERNAL MEDICINE

## 2024-11-02 PROCEDURE — 0LB80ZX EXCISION OF LEFT HAND TENDON, OPEN APPROACH, DIAGNOSTIC: ICD-10-PCS | Performed by: ORTHOPAEDIC SURGERY

## 2024-11-02 PROCEDURE — 87077 CULTURE AEROBIC IDENTIFY: CPT | Performed by: ORTHOPAEDIC SURGERY

## 2024-11-02 PROCEDURE — 80048 BASIC METABOLIC PNL TOTAL CA: CPT | Performed by: INTERNAL MEDICINE

## 2024-11-02 PROCEDURE — 370N000017 HC ANESTHESIA TECHNICAL FEE, PER MIN: Performed by: ORTHOPAEDIC SURGERY

## 2024-11-02 PROCEDURE — 272N000001 HC OR GENERAL SUPPLY STERILE: Performed by: ORTHOPAEDIC SURGERY

## 2024-11-02 PROCEDURE — 710N000009 HC RECOVERY PHASE 1, LEVEL 1, PER MIN: Performed by: ORTHOPAEDIC SURGERY

## 2024-11-02 PROCEDURE — 0PBV0ZZ EXCISION OF LEFT FINGER PHALANX, OPEN APPROACH: ICD-10-PCS | Performed by: ORTHOPAEDIC SURGERY

## 2024-11-02 PROCEDURE — 87205 SMEAR GRAM STAIN: CPT | Performed by: ORTHOPAEDIC SURGERY

## 2024-11-02 PROCEDURE — 120N000001 HC R&B MED SURG/OB

## 2024-11-02 PROCEDURE — 87075 CULTR BACTERIA EXCEPT BLOOD: CPT | Performed by: ORTHOPAEDIC SURGERY

## 2024-11-02 PROCEDURE — 999N000141 HC STATISTIC PRE-PROCEDURE NURSING ASSESSMENT: Performed by: ORTHOPAEDIC SURGERY

## 2024-11-02 PROCEDURE — 250N000009 HC RX 250: Performed by: NURSE ANESTHETIST, CERTIFIED REGISTERED

## 2024-11-02 PROCEDURE — 250N000025 HC SEVOFLURANE, PER MIN: Performed by: ORTHOPAEDIC SURGERY

## 2024-11-02 PROCEDURE — 0RNX0ZZ RELEASE LEFT FINGER PHALANGEAL JOINT, OPEN APPROACH: ICD-10-PCS | Performed by: ORTHOPAEDIC SURGERY

## 2024-11-02 PROCEDURE — 250N000011 HC RX IP 250 OP 636: Performed by: NURSE ANESTHETIST, CERTIFIED REGISTERED

## 2024-11-02 PROCEDURE — 85018 HEMOGLOBIN: CPT | Performed by: INTERNAL MEDICINE

## 2024-11-02 PROCEDURE — 258N000003 HC RX IP 258 OP 636: Performed by: NURSE ANESTHETIST, CERTIFIED REGISTERED

## 2024-11-02 PROCEDURE — 360N000075 HC SURGERY LEVEL 2, PER MIN: Performed by: ORTHOPAEDIC SURGERY

## 2024-11-02 RX ORDER — FENTANYL CITRATE 50 UG/ML
INJECTION, SOLUTION INTRAMUSCULAR; INTRAVENOUS PRN
Status: DISCONTINUED | OUTPATIENT
Start: 2024-11-02 | End: 2024-11-02

## 2024-11-02 RX ORDER — NALOXONE HYDROCHLORIDE 0.4 MG/ML
0.1 INJECTION, SOLUTION INTRAMUSCULAR; INTRAVENOUS; SUBCUTANEOUS
Status: DISCONTINUED | OUTPATIENT
Start: 2024-11-02 | End: 2024-11-02 | Stop reason: HOSPADM

## 2024-11-02 RX ORDER — DEXAMETHASONE SODIUM PHOSPHATE 4 MG/ML
4 INJECTION, SOLUTION INTRA-ARTICULAR; INTRALESIONAL; INTRAMUSCULAR; INTRAVENOUS; SOFT TISSUE
Status: DISCONTINUED | OUTPATIENT
Start: 2024-11-02 | End: 2024-11-02 | Stop reason: HOSPADM

## 2024-11-02 RX ORDER — KETOROLAC TROMETHAMINE 30 MG/ML
INJECTION, SOLUTION INTRAMUSCULAR; INTRAVENOUS PRN
Status: DISCONTINUED | OUTPATIENT
Start: 2024-11-02 | End: 2024-11-02

## 2024-11-02 RX ORDER — ONDANSETRON 4 MG/1
4 TABLET, ORALLY DISINTEGRATING ORAL EVERY 30 MIN PRN
Status: DISCONTINUED | OUTPATIENT
Start: 2024-11-02 | End: 2024-11-02 | Stop reason: HOSPADM

## 2024-11-02 RX ORDER — FENTANYL CITRATE 50 UG/ML
25 INJECTION, SOLUTION INTRAMUSCULAR; INTRAVENOUS EVERY 5 MIN PRN
Status: DISCONTINUED | OUTPATIENT
Start: 2024-11-02 | End: 2024-11-02 | Stop reason: HOSPADM

## 2024-11-02 RX ORDER — ACETAMINOPHEN 325 MG/1
975 TABLET ORAL ONCE
Status: COMPLETED | OUTPATIENT
Start: 2024-11-02 | End: 2024-11-02

## 2024-11-02 RX ORDER — ONDANSETRON 2 MG/ML
INJECTION INTRAMUSCULAR; INTRAVENOUS PRN
Status: DISCONTINUED | OUTPATIENT
Start: 2024-11-02 | End: 2024-11-02

## 2024-11-02 RX ORDER — KETOROLAC TROMETHAMINE 15 MG/ML
15 INJECTION, SOLUTION INTRAMUSCULAR; INTRAVENOUS
Status: DISCONTINUED | OUTPATIENT
Start: 2024-11-02 | End: 2024-11-02 | Stop reason: HOSPADM

## 2024-11-02 RX ORDER — PROPOFOL 10 MG/ML
INJECTION, EMULSION INTRAVENOUS PRN
Status: DISCONTINUED | OUTPATIENT
Start: 2024-11-02 | End: 2024-11-02

## 2024-11-02 RX ORDER — SODIUM CHLORIDE, SODIUM LACTATE, POTASSIUM CHLORIDE, CALCIUM CHLORIDE 600; 310; 30; 20 MG/100ML; MG/100ML; MG/100ML; MG/100ML
INJECTION, SOLUTION INTRAVENOUS CONTINUOUS PRN
Status: DISCONTINUED | OUTPATIENT
Start: 2024-11-02 | End: 2024-11-02

## 2024-11-02 RX ORDER — BUPIVACAINE HYDROCHLORIDE 2.5 MG/ML
INJECTION, SOLUTION INFILTRATION; PERINEURAL PRN
Status: DISCONTINUED | OUTPATIENT
Start: 2024-11-02 | End: 2024-11-05 | Stop reason: HOSPADM

## 2024-11-02 RX ORDER — OXYCODONE HYDROCHLORIDE 5 MG/1
5 TABLET ORAL EVERY 4 HOURS PRN
Status: DISCONTINUED | OUTPATIENT
Start: 2024-11-02 | End: 2024-11-05 | Stop reason: HOSPADM

## 2024-11-02 RX ORDER — BISACODYL 10 MG
10 SUPPOSITORY, RECTAL RECTAL DAILY PRN
Status: DISCONTINUED | OUTPATIENT
Start: 2024-11-05 | End: 2024-11-05 | Stop reason: HOSPADM

## 2024-11-02 RX ORDER — CEFAZOLIN SODIUM 2 G/100ML
2 INJECTION, SOLUTION INTRAVENOUS
Status: DISCONTINUED | OUTPATIENT
Start: 2024-11-02 | End: 2024-11-04

## 2024-11-02 RX ORDER — HYDROMORPHONE HCL IN WATER/PF 6 MG/30 ML
0.2 PATIENT CONTROLLED ANALGESIA SYRINGE INTRAVENOUS EVERY 5 MIN PRN
Status: DISCONTINUED | OUTPATIENT
Start: 2024-11-02 | End: 2024-11-02 | Stop reason: HOSPADM

## 2024-11-02 RX ORDER — ACETAMINOPHEN 325 MG/1
650 TABLET ORAL EVERY 4 HOURS PRN
Status: DISCONTINUED | OUTPATIENT
Start: 2024-11-05 | End: 2024-11-05 | Stop reason: HOSPADM

## 2024-11-02 RX ORDER — CEFAZOLIN SODIUM 2 G/100ML
2 INJECTION, SOLUTION INTRAVENOUS SEE ADMIN INSTRUCTIONS
Status: DISCONTINUED | OUTPATIENT
Start: 2024-11-02 | End: 2024-11-04

## 2024-11-02 RX ORDER — ONDANSETRON 2 MG/ML
4 INJECTION INTRAMUSCULAR; INTRAVENOUS EVERY 30 MIN PRN
Status: DISCONTINUED | OUTPATIENT
Start: 2024-11-02 | End: 2024-11-02 | Stop reason: HOSPADM

## 2024-11-02 RX ORDER — LIDOCAINE 40 MG/G
CREAM TOPICAL
Status: DISCONTINUED | OUTPATIENT
Start: 2024-11-02 | End: 2024-11-04

## 2024-11-02 RX ORDER — PROPOFOL 10 MG/ML
INJECTION, EMULSION INTRAVENOUS CONTINUOUS PRN
Status: DISCONTINUED | OUTPATIENT
Start: 2024-11-02 | End: 2024-11-02

## 2024-11-02 RX ORDER — ONDANSETRON 4 MG/1
4 TABLET, ORALLY DISINTEGRATING ORAL EVERY 6 HOURS PRN
Status: DISCONTINUED | OUTPATIENT
Start: 2024-11-02 | End: 2024-11-05 | Stop reason: HOSPADM

## 2024-11-02 RX ORDER — AMOXICILLIN 250 MG
1 CAPSULE ORAL 2 TIMES DAILY
Status: DISCONTINUED | OUTPATIENT
Start: 2024-11-02 | End: 2024-11-05 | Stop reason: HOSPADM

## 2024-11-02 RX ORDER — DEXAMETHASONE SODIUM PHOSPHATE 4 MG/ML
INJECTION, SOLUTION INTRA-ARTICULAR; INTRALESIONAL; INTRAMUSCULAR; INTRAVENOUS; SOFT TISSUE PRN
Status: DISCONTINUED | OUTPATIENT
Start: 2024-11-02 | End: 2024-11-02

## 2024-11-02 RX ORDER — EPHEDRINE SULFATE 50 MG/ML
INJECTION, SOLUTION INTRAMUSCULAR; INTRAVENOUS; SUBCUTANEOUS PRN
Status: DISCONTINUED | OUTPATIENT
Start: 2024-11-02 | End: 2024-11-02

## 2024-11-02 RX ORDER — OXYCODONE HYDROCHLORIDE 5 MG/1
10 TABLET ORAL
Status: DISCONTINUED | OUTPATIENT
Start: 2024-11-02 | End: 2024-11-02 | Stop reason: HOSPADM

## 2024-11-02 RX ORDER — POLYETHYLENE GLYCOL 3350 17 G/17G
17 POWDER, FOR SOLUTION ORAL DAILY
Status: DISCONTINUED | OUTPATIENT
Start: 2024-11-03 | End: 2024-11-05 | Stop reason: HOSPADM

## 2024-11-02 RX ORDER — MAGNESIUM HYDROXIDE 1200 MG/15ML
LIQUID ORAL PRN
Status: DISCONTINUED | OUTPATIENT
Start: 2024-11-02 | End: 2024-11-02 | Stop reason: HOSPADM

## 2024-11-02 RX ORDER — ONDANSETRON 2 MG/ML
4 INJECTION INTRAMUSCULAR; INTRAVENOUS EVERY 6 HOURS PRN
Status: DISCONTINUED | OUTPATIENT
Start: 2024-11-02 | End: 2024-11-05 | Stop reason: HOSPADM

## 2024-11-02 RX ORDER — FENTANYL CITRATE 50 UG/ML
50 INJECTION, SOLUTION INTRAMUSCULAR; INTRAVENOUS EVERY 5 MIN PRN
Status: DISCONTINUED | OUTPATIENT
Start: 2024-11-02 | End: 2024-11-02 | Stop reason: HOSPADM

## 2024-11-02 RX ORDER — LABETALOL HYDROCHLORIDE 5 MG/ML
10 INJECTION, SOLUTION INTRAVENOUS EVERY 5 MIN PRN
Status: DISCONTINUED | OUTPATIENT
Start: 2024-11-02 | End: 2024-11-02 | Stop reason: HOSPADM

## 2024-11-02 RX ORDER — HYDROMORPHONE HCL IN WATER/PF 6 MG/30 ML
0.4 PATIENT CONTROLLED ANALGESIA SYRINGE INTRAVENOUS EVERY 5 MIN PRN
Status: DISCONTINUED | OUTPATIENT
Start: 2024-11-02 | End: 2024-11-02 | Stop reason: HOSPADM

## 2024-11-02 RX ORDER — CEFAZOLIN SODIUM 1 G/3ML
INJECTION, POWDER, FOR SOLUTION INTRAMUSCULAR; INTRAVENOUS PRN
Status: DISCONTINUED | OUTPATIENT
Start: 2024-11-02 | End: 2024-11-02

## 2024-11-02 RX ORDER — OXYCODONE HYDROCHLORIDE 10 MG/1
10 TABLET ORAL EVERY 4 HOURS PRN
Status: DISCONTINUED | OUTPATIENT
Start: 2024-11-02 | End: 2024-11-05 | Stop reason: HOSPADM

## 2024-11-02 RX ORDER — OXYCODONE HYDROCHLORIDE 5 MG/1
5 TABLET ORAL
Status: COMPLETED | OUTPATIENT
Start: 2024-11-02 | End: 2024-11-02

## 2024-11-02 RX ORDER — LIDOCAINE HYDROCHLORIDE 20 MG/ML
INJECTION, SOLUTION INFILTRATION; PERINEURAL PRN
Status: DISCONTINUED | OUTPATIENT
Start: 2024-11-02 | End: 2024-11-02

## 2024-11-02 RX ADMIN — DEXAMETHASONE SODIUM PHOSPHATE 4 MG: 4 INJECTION, SOLUTION INTRA-ARTICULAR; INTRALESIONAL; INTRAMUSCULAR; INTRAVENOUS; SOFT TISSUE at 08:09

## 2024-11-02 RX ADMIN — OXYCODONE HYDROCHLORIDE 5 MG: 5 TABLET ORAL at 15:13

## 2024-11-02 RX ADMIN — POTASSIUM CITRATE 10 MEQ: 10 TABLET, EXTENDED RELEASE ORAL at 16:55

## 2024-11-02 RX ADMIN — GABAPENTIN 300 MG: 300 CAPSULE ORAL at 20:46

## 2024-11-02 RX ADMIN — DULOXETINE HYDROCHLORIDE 20 MG: 20 CAPSULE, DELAYED RELEASE ORAL at 16:54

## 2024-11-02 RX ADMIN — CEFAZOLIN 2 G: 1 INJECTION, POWDER, FOR SOLUTION INTRAMUSCULAR; INTRAVENOUS at 08:12

## 2024-11-02 RX ADMIN — TRAZODONE HYDROCHLORIDE 100 MG: 100 TABLET ORAL at 22:53

## 2024-11-02 RX ADMIN — PROPOFOL 180 MG: 10 INJECTION, EMULSION INTRAVENOUS at 08:08

## 2024-11-02 RX ADMIN — AMPICILLIN SODIUM AND SULBACTAM SODIUM 3 G: 2; 1 INJECTION, POWDER, FOR SOLUTION INTRAMUSCULAR; INTRAVENOUS at 22:14

## 2024-11-02 RX ADMIN — PANCRELIPASE 3 CAPSULE: 36000; 180000; 114000 CAPSULE, DELAYED RELEASE PELLETS ORAL at 11:28

## 2024-11-02 RX ADMIN — SENNOSIDES AND DOCUSATE SODIUM 1 TABLET: 8.6; 5 TABLET ORAL at 20:46

## 2024-11-02 RX ADMIN — OXYCODONE HYDROCHLORIDE 5 MG: 5 TABLET ORAL at 20:46

## 2024-11-02 RX ADMIN — PROPOFOL 50 MCG/KG/MIN: 10 INJECTION, EMULSION INTRAVENOUS at 08:08

## 2024-11-02 RX ADMIN — ESCITALOPRAM OXALATE 20 MG: 5 TABLET, FILM COATED ORAL at 16:54

## 2024-11-02 RX ADMIN — AMPICILLIN SODIUM AND SULBACTAM SODIUM 3 G: 2; 1 INJECTION, POWDER, FOR SOLUTION INTRAMUSCULAR; INTRAVENOUS at 09:46

## 2024-11-02 RX ADMIN — SODIUM CHLORIDE, POTASSIUM CHLORIDE, SODIUM LACTATE AND CALCIUM CHLORIDE: 600; 310; 30; 20 INJECTION, SOLUTION INTRAVENOUS at 08:06

## 2024-11-02 RX ADMIN — ONDANSETRON 4 MG: 2 INJECTION INTRAMUSCULAR; INTRAVENOUS at 00:28

## 2024-11-02 RX ADMIN — SENNOSIDES AND DOCUSATE SODIUM 1 TABLET: 8.6; 5 TABLET ORAL at 12:24

## 2024-11-02 RX ADMIN — FENTANYL CITRATE 100 MCG: 50 INJECTION INTRAMUSCULAR; INTRAVENOUS at 08:08

## 2024-11-02 RX ADMIN — AMPICILLIN SODIUM AND SULBACTAM SODIUM 3 G: 2; 1 INJECTION, POWDER, FOR SOLUTION INTRAMUSCULAR; INTRAVENOUS at 16:15

## 2024-11-02 RX ADMIN — OXYCODONE HYDROCHLORIDE 5 MG: 5 TABLET ORAL at 10:03

## 2024-11-02 RX ADMIN — AMPICILLIN SODIUM AND SULBACTAM SODIUM 3 G: 2; 1 INJECTION, POWDER, FOR SOLUTION INTRAMUSCULAR; INTRAVENOUS at 04:00

## 2024-11-02 RX ADMIN — PANTOPRAZOLE SODIUM 40 MG: 40 TABLET, DELAYED RELEASE ORAL at 16:15

## 2024-11-02 RX ADMIN — ACETAMINOPHEN 975 MG: 325 TABLET, FILM COATED ORAL at 10:03

## 2024-11-02 RX ADMIN — ONDANSETRON 4 MG: 2 INJECTION INTRAMUSCULAR; INTRAVENOUS at 08:58

## 2024-11-02 RX ADMIN — PANCRELIPASE 3 CAPSULE: 36000; 180000; 114000 CAPSULE, DELAYED RELEASE PELLETS ORAL at 17:50

## 2024-11-02 RX ADMIN — EPHEDRINE SULFATE 5 MG: 5 INJECTION INTRAVENOUS at 08:20

## 2024-11-02 RX ADMIN — ACETAMINOPHEN 1000 MG: 500 TABLET, FILM COATED ORAL at 20:47

## 2024-11-02 RX ADMIN — FAMOTIDINE 40 MG: 20 TABLET ORAL at 20:46

## 2024-11-02 RX ADMIN — KETOROLAC TROMETHAMINE 15 MG: 30 INJECTION, SOLUTION INTRAMUSCULAR at 08:09

## 2024-11-02 RX ADMIN — LIDOCAINE HYDROCHLORIDE 40 MG: 20 INJECTION, SOLUTION INFILTRATION; PERINEURAL at 08:08

## 2024-11-02 RX ADMIN — ACETAMINOPHEN 1000 MG: 500 TABLET, FILM COATED ORAL at 03:59

## 2024-11-02 NOTE — PLAN OF CARE
"Goal Outcome Evaluation:      Plan of Care Reviewed With: patient    Overall Patient Progress: improvingOverall Patient Progress: improving    Outcome Evaluation: Patient alert, oriented x 4, slept between cares. Breathing comfortably on RA.  Pain well controlled. Able to make needs known.  NPO after MN. I & D to right index finger this AM. CHG shower /wipes completed. IV abx given as ordered.    Plan : I & D to left 2nd finger and hand.    /56 (BP Location: Right arm)   Pulse 71   Temp 97.8  F (36.6  C) (Temporal)   Resp 16   Ht 1.626 m (5' 4\")   Wt 68.6 kg (151 lb 3.8 oz)   LMP 12/19/2013   SpO2 91%   BMI 25.96 kg/m      Problem: Adult Inpatient Plan of Care  Goal: Plan of Care Review  Description: The Plan of Care Review/Shift note should be completed every shift.  The Outcome Evaluation is a brief statement about your assessment that the patient is improving, declining, or no change.  This information will be displayed automatically on your shift  note.  Outcome: Progressing  Flowsheets (Taken 11/2/2024 6290)  Outcome Evaluation: Patient alert, oriented x 4, slept between cares. Breathing comfortably on RA.  Pain well controlled. Able to make needs known.  NPO after MN. I & D to right index finger this AM. CHG shower /wipes completed. IV abx given as ordered.  Plan of Care Reviewed With: patient  Overall Patient Progress: improving  Goal: Patient-Specific Goal (Individualized)  Description: You can add care plan individualizations to a care plan. Examples of Individualization might be:  \"Parent requests to be called daily at 9am for status\", \"I have a hard time hearing out of my right ear\", or \"Do not touch me to wake me up as it startles  me\".  Outcome: Progressing  Goal: Absence of Hospital-Acquired Illness or Injury  Outcome: Progressing  Intervention: Identify and Manage Fall Risk  Recent Flowsheet Documentation  Taken 11/1/2024 2200 by Coco Baxter RN  Safety Promotion/Fall Prevention:   " nonskid shoes/slippers when out of bed   safety round/check completed  Intervention: Prevent Skin Injury  Recent Flowsheet Documentation  Taken 11/2/2024 0100 by Coco Baxter RN  Body Position: position changed independently  Taken 11/1/2024 2200 by Coco Baxter RN  Body Position: position changed independently  Intervention: Prevent and Manage VTE (Venous Thromboembolism) Risk  Recent Flowsheet Documentation  Taken 11/1/2024 2200 by Coco Baxter RN  VTE Prevention/Management: SCDs off (sequential compression devices)  Intervention: Prevent Infection  Recent Flowsheet Documentation  Taken 11/1/2024 2200 by Coco Baxter RN  Infection Prevention: rest/sleep promoted  Goal: Optimal Comfort and Wellbeing  Outcome: Progressing  Intervention: Monitor Pain and Promote Comfort  Recent Flowsheet Documentation  Taken 11/1/2024 2200 by Coco Baxter RN  Pain Management Interventions: medication (see MAR)  Goal: Readiness for Transition of Care  Outcome: Progressing     Problem: Skin or Soft Tissue Infection  Goal: Absence of Infection Signs and Symptoms  Outcome: Progressing  Intervention: Minimize and Manage Infection Progression  Recent Flowsheet Documentation  Taken 11/1/2024 2200 by Coco Baxter RN  Infection Prevention: rest/sleep promoted  Infection Management: aseptic technique maintained  Intervention: Provide Meticulous Infection Site Care  Recent Flowsheet Documentation  Taken 11/1/2024 2200 by Coco Baxter RN  Topical Inflammation Care: border recession noted     Problem: Pain Acute  Goal: Optimal Pain Control and Function  Outcome: Progressing  Intervention: Develop Pain Management Plan  Recent Flowsheet Documentation  Taken 11/1/2024 2200 by Coco Baxter RN  Pain Management Interventions: medication (see MAR)  Intervention: Prevent or Manage Pain  Recent Flowsheet Documentation  Taken 11/1/2024 2200 by Coco Baxter RN  Medication Review/Management: medications reviewed     Problem:  Comorbidity Management  Goal: Maintenance of Asthma Control  Outcome: Progressing  Intervention: Maintain Asthma Symptom Control  Recent Flowsheet Documentation  Taken 11/1/2024 2200 by Coco Baxter RN  Medication Review/Management: medications reviewed  Goal: Maintenance of Behavioral Health Symptom Control  Outcome: Progressing  Intervention: Maintain Behavioral Health Symptom Control  Recent Flowsheet Documentation  Taken 11/1/2024 2200 by Coco Baxter RN  Medication Review/Management: medications reviewed  Goal: Maintenance of COPD Symptom Control  Outcome: Progressing  Intervention: Maintain COPD Symptom Control  Recent Flowsheet Documentation  Taken 11/1/2024 2200 by Coco Baxter RN  Medication Review/Management: medications reviewed  Goal: Blood Glucose Levels Within Targeted Range  Outcome: Progressing  Intervention: Monitor and Manage Glycemia  Recent Flowsheet Documentation  Taken 11/1/2024 2200 by Coco Baxter RN  Medication Review/Management: medications reviewed  Goal: Maintenance of Heart Failure Symptom Control  Outcome: Progressing  Intervention: Maintain Heart Failure Management  Recent Flowsheet Documentation  Taken 11/1/2024 2200 by Coco Baxter RN  Medication Review/Management: medications reviewed  Goal: Blood Pressure in Desired Range  Outcome: Progressing  Intervention: Maintain Blood Pressure Management  Recent Flowsheet Documentation  Taken 11/1/2024 2200 by Coco Baxter RN  Medication Review/Management: medications reviewed  Goal: Maintenance of Osteoarthritis Symptom Control  Outcome: Progressing  Intervention: Maintain Osteoarthritis Symptom Control  Recent Flowsheet Documentation  Taken 11/1/2024 2200 by Coco Baxter RN  Activity Management: activity adjusted per tolerance  Medication Review/Management: medications reviewed  Goal: Bariatric Home Regimen Maintained  Outcome: Progressing  Intervention: Maintain and Manage Postbariatric Surgery Care  Recent Flowsheet  Documentation  Taken 11/1/2024 2200 by Coco Baxter RN  Medication Review/Management: medications reviewed  Goal: Maintenance of Seizure Control  Outcome: Progressing  Intervention: Maintain Seizure Symptom Control  Recent Flowsheet Documentation  Taken 11/1/2024 2200 by Coco Baxter RN  Medication Review/Management: medications reviewed     Problem: Infection  Goal: Absence of Infection Signs and Symptoms  Outcome: Progressing  Intervention: Prevent or Manage Infection  Recent Flowsheet Documentation  Taken 11/1/2024 2200 by Coco Baxter RN  Infection Management: aseptic technique maintained

## 2024-11-02 NOTE — ANESTHESIA CARE TRANSFER NOTE
Patient: Lynn Thompson    Procedure: Procedure(s):  Incision and drainage left indef finger finger and PIP joint irrigation       Diagnosis: Infected finger [L08.9]  Diagnosis Additional Information: No value filed.    Anesthesia Type:   General     Note:    Oropharynx: oropharynx clear of all foreign objects  Level of Consciousness: awake  Oxygen Supplementation: face mask  Level of Supplemental Oxygen (L/min / FiO2): 8  Independent Airway: airway patency satisfactory and stable  Dentition: dentition unchanged  Vital Signs Stable: post-procedure vital signs reviewed and stable  Report to RN Given: handoff report given  Patient transferred to: PACU    Handoff Report: Identifed the Patient, Identified the Reponsible Provider, Reviewed the pertinent medical history, Discussed the surgical course, Reviewed Intra-OP anesthesia mangement and issues during anesthesia, Set expectations for post-procedure period and Allowed opportunity for questions and acknowledgement of understanding      Vitals:  Vitals Value Taken Time   /67 11/02/24 0906   Temp 97.34  F (36.3  C) 11/02/24 0910   Pulse 86 11/02/24 0910   Resp 34 11/02/24 0910   SpO2 100 % 11/02/24 0910   Vitals shown include unfiled device data.    Electronically Signed By: MITZY Benton CRNA  November 2, 2024  9:12 AM

## 2024-11-02 NOTE — CONSULTS
Date of Service: Nov 1, 2024    Chief Complaint: Dog Bite       History of Present Illness: Lynn Thompson is a 59 year old, right handed female who suffered a left index finger cat bite on Thursday, October 31, 2024 while trying to break up a fight between her cat and rescue dog.  She placed her hand in the dogs mouth to open his jaw and she was inadvertently bit.  She presented initially to the ED that morning and was given a dose of IV Unasyn and discharged on oral Augmentin.  However, the pain and swelling increased and she returned to the ED early on November 1, 2024.  She was again started on IV antibiotics and admitted for further surgical intervention.  She has a history of dog bite to this hand in the past that required I&D as well.  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  She is considered a Type 1 diabetic given her pancreatectomy.    Review of Systems: A 14-point review of systems was obtained and is as above and otherwise unremarkable.    Past Medical History:  Past Medical History:   Diagnosis Date    Benign paroxysmal positional vertigo     occ.     Calculus of kidney 05/2005    x1 on L side passed, several stones.  Has been tested for oxalate.    Chronic abdominal pain 07/17/2013    Chronic pancreatitis 07/17/2013    Depression     also occ panic spells    Diabetes (H) 5/10/2013    Total Pancreatomy with Auto Islets Transplant    Dyspepsia 06/1999    H. 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 Presley     SECTION      CHOLECYSTECTOMY      COLONOSCOPY N/A 2021    Procedure: COLONOSCOPY INCOMPLETE Aborted due to incomplete prep  will need to take additional prep and return tomorrow 21;  Surgeon: Ihsan Saenz MD;  Location: [facility]    COMBINED CYSTOSCOPY, RETROGRADES, URETEROSCOPY, LASER HOLMIUM LITHOTRIPSY URETER(S), INSERT STENT Right 2015    Procedure: COMBINED CYSTOSCOPY, RETROGRADES, URETEROSCOPY, LASER HOLMIUM LITHOTRIPSY URETER(S), INSERT STENT;  Surgeon: Kennedi Aldana MD;  Location: [facility]    COMBINED CYSTOSCOPY, RETROGRADES, URETEROSCOPY, LASER HOLMIUM LITHOTRIPSY URETER(S), INSERT STENT Right 2015    Procedure: COMBINED CYSTOSCOPY, RETROGRADES, URETEROSCOPY, LASER HOLMIUM LITHOTRIPSY URETER(S), INSERT STENT;  Surgeon: Kennedi Aldana MD;  Location: [facility]    COSMETIC SURGERY  2002    Tummy tuck    CYSTECTOMY OVARIAN BENIGN Right     CYSTOSCOPY, RETROGRADES, INSERT STENT URETER(S), COMBINED  10/02/2012    Procedure: COMBINED CYSTOSCOPY, RETROGRADES, INSERT STENT URETER(S);  COMBINED CYSTOSCOPY,  , INSERT LEFT STENT URETER;  Surgeon: Johny Baez MD;  Location: [facility]    CYSTOSCOPY, RETROGRADES, INSERT STENT URETER(S), COMBINED Left 2022    Procedure: Cystoscopy with left retrograde pyelogram, left ureteroscopy, thulium laser lithotripsy of left ureteral calculus, stone basketing and left ureteral stent insertion;  Surgeon: Alonzo Rome MD;  Location: [facility]    ESOPHAGOSCOPY, GASTROSCOPY, DUODENOSCOPY (EGD), COMBINED N/A 2018    Procedure: COMBINED ESOPHAGOSCOPY, GASTROSCOPY, DUODENOSCOPY (EGD);  ESOPHAGOSCOPY, GASTROSCOPY, DUODENOSCOPY (EGD)    ;  Surgeon: Tamir Rodgers MD;  Location:  GI    EXTRACORPOREAL SHOCK WAVE LITHOTRIPSY (ESWL)  10/16/2012    Procedure: EXTRACORPOREAL  SHOCK WAVE LITHOTRIPSY (ESWL);  left EXTRACORPOREAL SHOCK WAVE LITHOTRIPSY (ESWL) ;  Surgeon: Johny Baez MD;  Location: [facility]    Gastric bypass NOS      HERNIA REPAIR  02/2015    HYSTERECTOMY SUPRACERVICAL, BILATERAL SALPINGO-OOPHORECTOMY, COMBINED N/A 02/01/2022    Procedure: Abdominal supracervical hysterectomy, bilateral salpingooophrectomy;  Surgeon: Nicole Rivera MD;  [facility]    IRRIGATION AND DEBRIDEMENT HAND, COMBINED Left 10/30/2020    Procedure: Left hand sharp excisional debridement of skin, subcutaneous tissue and fat with a scalpel, 2 x 1 x 1 cm.;  Surgeon: Demian Renteria MD;  Location: [facility]    LAPAROSCOPIC LYSIS ADHESIONS N/A 02/20/2015    Procedure: LAPAROSCOPIC LYSIS ADHESIONS;  Surgeon: Aaron Early MD;  [facility]    LAPAROSCOPIC LYSIS ADHESIONS N/A 12/29/2015    Procedure: LAPAROSCOPIC LYSIS ADHESIONS;  Surgeon: Aaron Early MD;  [facility]    PANCREATECTOMY, TRANSPLANT AUTO ISLET CELL, COMBINED  05/10/2013    Procedure: COMBINED PANCREATECTOMY, TRANSPLANT AUTO ISLET CELL;  Pancreatectomy, Auto Islet Cell Transplant   hernia repair, jejunostomy tube and liver biopsies with Anesthesia General with block;  Surgeon: Aaron Early MD;  [facility]    Partial ileum resection  1992    RECTOPEXY ABDOMINAL N/A 02/01/2022    Procedure: RECTOPEXY, ABDOMINAL;  Surgeon: Uriah Sheridan MD;  [facility]    REPAIR PTOSIS BROW BILATERAL Bilateral 06/09/2020    Procedure: BILATERAL BROW PTOSIS REPAIR;  Surgeon: Denise Alberts MD;  Location: [facility]    SACROCOLPOPEXY, CYSTOSCOPY, COMBINED N/A 02/01/2022    Procedure: Uterosacral ligament suspension, pina colposuspension with Cystoscopy;  Surgeon: Nicole Rivera MD;  [facility]    SIGMOIDOSCOPY FLEXIBLE N/A 02/01/2022    Procedure: SIGMOIDOSCOPY, FLEXIBLE;  Surgeon: Uriah Sheridan MD;  [facility]    Surgery for SBO  2015    TONSILLECTOMY, ADENOIDECTOMY, COMBINED   1997    TRANSPLANT  5/10/13    Pancreatic Auto-Islet Transplant        MEDICATIONS:    Current Facility-Administered Medications:     [Auto Hold] acetaminophen (TYLENOL) tablet 650 mg, 650 mg, Oral, Q4H PRN, 650 mg at 11/01/24 2237 **OR** [Auto Hold] acetaminophen (TYLENOL) Suppository 650 mg, 650 mg, Rectal, Q4H PRN, Nnamdi Ahumada MD    [Auto Hold] acetaminophen (TYLENOL) tablet 1,000 mg, 1,000 mg, Oral, Q8H, Andrew Fowler MD, 1,000 mg at 11/02/24 0359    acetaminophen (TYLENOL) tablet 975 mg, 975 mg, Oral, Once, Kendra Chua MD    [Auto Hold] ampicillin-sulbactam (UNASYN) 3 g vial to attach to  mL bag, 3 g, Intravenous, Q6H, Andrew Fowler MD, 3 g at 11/02/24 0400    [Auto Hold] calcium carbonate (TUMS) chewable tablet 1,000 mg, 1,000 mg, Oral, 4x Daily PRN, Nnamdi Ahumada MD    dexAMETHasone (DECADRON) injection 4 mg, 4 mg, Intravenous, Once PRN, Kendra Chua MD    [Auto Hold] glucose gel 15-30 g, 15-30 g, Oral, Q15 Min PRN **OR** [Auto Hold] dextrose 50 % injection 25-50 mL, 25-50 mL, Intravenous, Q15 Min PRN **OR** [Auto Hold] glucagon injection 1 mg, 1 mg, Subcutaneous, Q15 Min PRN, Nnamdi Ahumada MD    [Auto Hold] DULoxetine (CYMBALTA) DR capsule 20 mg, 20 mg, Oral, Daily, Nnamdi Ahumada MD, 20 mg at 11/01/24 1018    [Auto Hold] escitalopram (LEXAPRO) tablet 20 mg, 20 mg, Oral, Daily, Nnamdi Ahumada MD, 20 mg at 11/01/24 1018    [Auto Hold] famotidine (PEPCID) tablet 40 mg, 40 mg, Oral, BID, Nnamdi Ahumada MD, 40 mg at 11/01/24 2017    fentaNYL (PF) (SUBLIMAZE) injection 25 mcg, 25 mcg, Intravenous, Q5 Min PRN, Kendra Chua MD    fentaNYL (PF) (SUBLIMAZE) injection 50 mcg, 50 mcg, Intravenous, Q5 Min PRN, Kendra Chua MD    [Auto Hold] gabapentin (NEURONTIN) capsule 300 mg, 300 mg, Oral, BID, BrandynNnamdi MD, 300 mg at 11/01/24 2017    HYDROmorphone (DILAUDID) injection 0.2 mg, 0.2 mg, Intravenous, Q5 Min PRN, Harshil  MD Kendra    [Auto Hold] HYDROmorphone (DILAUDID) injection 0.2 mg, 0.2 mg, Intravenous, Q2H PRN, Nnamdi Ahumada MD, 0.2 mg at 11/01/24 1307    HYDROmorphone (DILAUDID) injection 0.4 mg, 0.4 mg, Intravenous, Q5 Min PRN, Kendra Chua MD    [Auto Hold] HYDROmorphone (PF) (DILAUDID) injection 0.5 mg, 0.5 mg, Intravenous, Q2H PRN, Andrew Fowler MD    [Auto Hold] hydrOXYzine HCl (ATARAX) tablet 25 mg, 25 mg, Oral, Daily PRN, Nnamdi Ahumada MD, 25 mg at 11/01/24 0539    [Auto Hold] insulin aspart (NovoLOG/FIASP) 100 UNIT/ML VIAL FOR FILLING PUMP RESERVOIR, , Device, See Admin Instructions, Nnamdi Ahumada MD    insulin basal rate from AMBULATORY PUMP, , Subcutaneous, Continuous, Nnamdi Ahumada MD, Rate Verify at 11/01/24 1101    [Auto Hold] insulin bolus from AMBULATORY PUMP, , Subcutaneous, 4x Daily AC & HS, Nnamdi Ahumada MD    [Auto Hold] insulin bolus from AMBULATORY PUMP, , Subcutaneous, TID AC, Nnamdi Ahumada MD, 3 Units at 11/01/24 1835    ketorolac (TORADOL) injection 15 mg, 15 mg, Intravenous, Once PRN, Kendra Chua MD    labetalol (NORMODYNE/TRANDATE) injection 10 mg, 10 mg, Intravenous, Q5 Min PRN, Kendra Chua MD    [Auto Hold] levothyroxine (SYNTHROID/LEVOTHROID) tablet 125 mcg, 125 mcg, Oral, QAM Brandyn VALE Derek William, MD, 125 mcg at 11/01/24 0923    [Auto Hold] lidocaine (LMX4) cream, , Topical, Q1H PRN, Nnamdi Ahumada MD    [Auto Hold] lidocaine 1 % 0.1-1 mL, 0.1-1 mL, Other, Q1H PRN, Nnamdi Ahumada MD    [Auto Hold] lipase-protease-amylase (CREON 36) [id number] units per EC Capsule 2 capsule, 2 capsule, Oral, With Snacks or Supplements, Nnamdi Ahumada MD, 2 capsule at 11/01/24 1425    [Auto Hold] lipase-protease-amylase (CREON 36) [id number] units per EC Capsule 3 capsule, 3 capsule, Oral, TID w/meals, Nnamdi Ahumada MD, 3 capsule at 11/01/24 1722    [Auto Hold] loratadine (CLARITIN)  tablet 10 mg, 10 mg, Oral, Daily PRN, Nnamdi Ahumada MD    [Auto Hold] melatonin tablet 5 mg, 5 mg, Oral, At Bedtime PRN, Nnamdi Ahumada MD    naloxone (NARCAN) injection 0.1 mg, 0.1 mg, Intravenous, Q2 Min PRN, Kendra Chua MD    [Auto Hold] naloxone (NARCAN) injection 0.2 mg, 0.2 mg, Intravenous, Q2 Min PRN **OR** [Auto Hold] naloxone (NARCAN) injection 0.4 mg, 0.4 mg, Intravenous, Q2 Min PRN **OR** [Auto Hold] naloxone (NARCAN) injection 0.2 mg, 0.2 mg, Intramuscular, Q2 Min PRN **OR** [Auto Hold] naloxone (NARCAN) injection 0.4 mg, 0.4 mg, Intramuscular, Q2 Min PRN, Nnamdi Ahumada MD    ondansetron (ZOFRAN ODT) ODT tab 4 mg, 4 mg, Oral, Q30 Min PRN **OR** ondansetron (ZOFRAN) injection 4 mg, 4 mg, Intravenous, Q30 Min PRN, Kendra Chua MD    [Auto Hold] ondansetron (ZOFRAN ODT) ODT tab 4 mg, 4 mg, Oral, Q6H PRN **OR** [Auto Hold] ondansetron (ZOFRAN) injection 4 mg, 4 mg, Intravenous, Q6H PRN, Nnamdi Ahumada MD, 4 mg at 11/02/24 0028    [Auto Hold] oxyCODONE (ROXICODONE) tablet 5 mg, 5 mg, Oral, Q4H PRN, Nnamdi Ahumada MD, 5 mg at 11/01/24 2238    [Auto Hold] oxyCODONE IR (ROXICODONE) half-tab 2.5 mg, 2.5 mg, Oral, Q4H PRN, Nnamdi Ahumada MD, 2.5 mg at 11/01/24 1835    [Auto Hold] pantoprazole (PROTONIX) EC tablet 40 mg, 40 mg, Oral, BID AC, Nnamdi Ahumada MD, 40 mg at 11/01/24 1615    [Auto Hold] polyethylene glycol (MIRALAX) Packet 17 g, 17 g, Oral, BID PRN, Nnamdi Ahumada MD    [Auto Hold] potassium citrate (UROCIT-K) CR tablet 10 mEq, 10 mEq, Oral, Daily, Nnamdi Ahumada MD, 10 mEq at 11/01/24 1100    [Auto Hold] prochlorperazine (COMPAZINE) injection 10 mg, 10 mg, Intravenous, Q6H PRN **OR** [Auto Hold] prochlorperazine (COMPAZINE) tablet 10 mg, 10 mg, Oral, Q6H PRN **OR** [Auto Hold] prochlorperazine (COMPAZINE) suppository 25 mg, 25 mg, Rectal, Q12H PRN, Nnamdi Ahumada MD    prochlorperazine (COMPAZINE) injection 5 mg,  "5 mg, Intravenous, Q6H PRN, Kendra Chua MD    [Auto Hold] senna-docusate (SENOKOT-S/PERICOLACE) 8.6-50 MG per tablet 1 tablet, 1 tablet, Oral, BID PRN **OR** [Auto Hold] senna-docusate (SENOKOT-S/PERICOLACE) 8.6-50 MG per tablet 2 tablet, 2 tablet, Oral, BID PRN, Nnamdi Ahumada MD    [Auto Hold] sodium chloride (PF) 0.9% PF flush 3 mL, 3 mL, Intracatheter, Q8H, Nnamdi Ahumada MD, 3 mL at 11/02/24 0400    [Auto Hold] sodium chloride (PF) 0.9% PF flush 3 mL, 3 mL, Intracatheter, q1 min prn, Nnamdi Ahumada MD    [Auto Hold] traZODone (DESYREL) tablet 100 mg, 100 mg, Oral, At Bedtime PRN, Nnadmi Ahumada MD    ALLERGIES:  Allergies   Allergen Reactions    Corticosteroids Other (See Comments)     All oral, IV and injectable steroids are contraindicated (unless in life threatening situations) in Islet Auto transplant recipients. They can cause irreversible loss of islet cell function. Please contact patient's transplant care coordinator, Erlinda Multani RN BSN at [phone]/pager: [phone] and endocrinologist prior to administration.      Povidone Iodine Hives     Causes skin to blister    Nsaids      naprosyn = GI upset    Sulfasalazine Nausea and Nausea and Vomiting       Social History:  Patient lives with her .  On disability due to her health history.  Negative tobacco use.  Negative alcohol use.  She cares for rescue dogs.    Family History:  Negative for bleeding or clotting disorders or adverse reactions to anesthesia.    Physical examination:  VITALS: BP (!) 150/87   Pulse 67   Temp 97  F (36.1  C) (Temporal)   Resp 16   Ht 1.626 m (5' 4\")   Wt 68.6 kg (151 lb 3.8 oz)   LMP 12/19/2013   SpO2 97%   BMI 25.96 kg/m    GENERAL: Healthy-appearing adult female in no acute distress.  Alert and oriented times three.  HEENT: Head normocephalic and atraumatic.  Extra-ocular movements intact.  Neck: Full range of motion without pain.  Respiratory: Breathing regular " and non-labored.  Left upper extremity: Full shoulder, elbow, forearm, and wrist range of motion.    Left index finger:  There are two bite wounds, one on the volar aspect of the proximal phalanx and one dorsally at the PIP joint.  The index finger is erythematous and swollen.  The erythema extends proximally to the dorsal hand.  There is tenderness to palpation along the volar aspect of the index finger at the proximal phalanx and A1 pulley.  She has limited range of motion.  Skin: Intact without any rashes or abrasions.    Radiographs: Three views of the left index finger were available for review, dated 10/31/2024.  These demonstrated a bony idris along the radial aspect of the index finger proximal phalanx, most consistent with a fracture fragment versus heterotopic bone.  It does not appear consistent with a foreign body.    Labs: WBC - 11.2 - elevated this morning.  CRP - <3.00  ESR - 10    Assessment: 59 year old, right handed female with left index finger dog bite with concern for flexor tenosynovitis.    Plan: I have recommended surgical intervention in the form of incision and debridement with possible flexor tendon sheath irrigation.  I have described the procedure, post-operative protocol, and expected outcomes.  I also discussed the risks of surgery which include, but are not limited to, bleeding, infection, nerve or vessel damage, wound healing problems, persistent pain, finger stiffness, persistent infection, and the possibility for further surgery.  Anesthetic risks are rare but include breathing problems, heart problems, stroke and death.  After a full discussion of risks, benefits, and alternatives to surgery, the patient has elected to proceed.  Informed consent was obtained.  She understands that she may require a weekend stay and continued monitoring.  There is also a possibility that she will require IV antibiotics at discharge. All questions were answered.    Aileen Reza MD

## 2024-11-02 NOTE — ANESTHESIA PREPROCEDURE EVALUATION
Anesthesia Pre-Procedure Evaluation    Patient: Lynn Thompson   MRN: [id number] : 1965        Procedure : Procedure(s):  Incision and drainage finger, combined, Flexor tendon sheath irrigation          Past Medical History:   Diagnosis Date    Benign paroxysmal positional vertigo     occ.     Calculus of kidney 05/2005    x1 on L side passed, several stones.  Has been tested for oxalate.    Chronic abdominal pain 2013    Chronic pancreatitis 2013    Depression     also occ panic spells    Diabetes (H) 5/10/2013    Total Pancreatomy with Auto Islets Transplant    Dyspepsia 1999    H. pylori   treated    Dysthymia 2016    Headaches     still periodic HA's ;  often 5X/week    Hypertension 2016    Stress related    Iron deficiency anemia 2003    relates to gastric bypass    Post-pancreatectomy diabetes melltius 2013    Sleep apnea     uses splint    Spasm of sphincter of Oddi     surgical + endoscopic stenting of pancreatic duct @ [facility] 06    Thyroid nodule 2016      Past Surgical History:   Procedure Laterality Date    ABDOMINOPLASTY  2002    Tummy tuck    APPENDECTOMY  1990    BUNIONECTOMY Right 1998    CBD Stent placement  2002    CBD stent; Dr. Presley     SECTION      CHOLECYSTECTOMY      COLONOSCOPY N/A 2021    Procedure: COLONOSCOPY INCOMPLETE Aborted due to incomplete prep  will need to take additional prep and return tomorrow 21;  Surgeon: Ihsan Saenz MD;  Location: [facility]    COMBINED CYSTOSCOPY, RETROGRADES, URETEROSCOPY, LASER HOLMIUM LITHOTRIPSY URETER(S), INSERT STENT Right 2015    Procedure: COMBINED CYSTOSCOPY, RETROGRADES, URETEROSCOPY, LASER HOLMIUM LITHOTRIPSY URETER(S), INSERT STENT;  Surgeon: Kennedi Aldana MD;  Location: [facility]    COMBINED CYSTOSCOPY, RETROGRADES, URETEROSCOPY, LASER HOLMIUM LITHOTRIPSY URETER(S), INSERT STENT Right 2015    Procedure: COMBINED CYSTOSCOPY, RETROGRADES,  URETEROSCOPY, LASER HOLMIUM LITHOTRIPSY URETER(S), INSERT STENT;  Surgeon: Kennedi Aldana MD;  Location: [facility]    COSMETIC SURGERY  6/24/2002    Tummy tuck    CYSTECTOMY OVARIAN BENIGN Right     CYSTOSCOPY, RETROGRADES, INSERT STENT URETER(S), COMBINED  10/02/2012    Procedure: COMBINED CYSTOSCOPY, RETROGRADES, INSERT STENT URETER(S);  COMBINED CYSTOSCOPY,  , INSERT LEFT STENT URETER;  Surgeon: Johny Baez MD;  Location: [facility]    CYSTOSCOPY, RETROGRADES, INSERT STENT URETER(S), COMBINED Left 9/20/2022    Procedure: Cystoscopy with left retrograde pyelogram, left ureteroscopy, thulium laser lithotripsy of left ureteral calculus, stone basketing and left ureteral stent insertion;  Surgeon: Alonzo Rome MD;  Location: [facility]    ESOPHAGOSCOPY, GASTROSCOPY, DUODENOSCOPY (EGD), COMBINED N/A 01/24/2018    Procedure: COMBINED ESOPHAGOSCOPY, GASTROSCOPY, DUODENOSCOPY (EGD);  ESOPHAGOSCOPY, GASTROSCOPY, DUODENOSCOPY (EGD)    ;  Surgeon: Tamir Rodgers MD;  Location: [facility]    EXTRACORPOREAL SHOCK WAVE LITHOTRIPSY (ESWL)  10/16/2012    Procedure: EXTRACORPOREAL SHOCK WAVE LITHOTRIPSY (ESWL);  left EXTRACORPOREAL SHOCK WAVE LITHOTRIPSY (ESWL) ;  Surgeon: Johny Baez MD;  Location: [facility]    Gastric bypass NOS      HERNIA REPAIR  02/2015    HYSTERECTOMY SUPRACERVICAL, BILATERAL SALPINGO-OOPHORECTOMY, COMBINED N/A 02/01/2022    Procedure: Abdominal supracervical hysterectomy, bilateral salpingooophrectomy;  Surgeon: Nicole Rivera MD;  [facility]    IRRIGATION AND DEBRIDEMENT HAND, COMBINED Left 10/30/2020    Procedure: Left hand sharp excisional debridement of skin, subcutaneous tissue and fat with a scalpel, 2 x 1 x 1 cm.;  Surgeon: Demian Renteria MD;  Location: [facility]    LAPAROSCOPIC LYSIS ADHESIONS N/A 02/20/2015    Procedure: LAPAROSCOPIC LYSIS ADHESIONS;  Surgeon: Aaron Early MD;  [facility]    LAPAROSCOPIC LYSIS ADHESIONS N/A 12/29/2015    Procedure: LAPAROSCOPIC LYSIS  ADHESIONS;  Surgeon: Aaron Early MD;  [facility]    PANCREATECTOMY, TRANSPLANT AUTO ISLET CELL, COMBINED  05/10/2013    Procedure: COMBINED PANCREATECTOMY, TRANSPLANT AUTO ISLET CELL;  Pancreatectomy, Auto Islet Cell Transplant   hernia repair, jejunostomy tube and liver biopsies with Anesthesia General with block;  Surgeon: Aaron Early MD;  [facility]    Partial ileum resection  1992    RECTOPEXY ABDOMINAL N/A 02/01/2022    Procedure: RECTOPEXY, ABDOMINAL;  Surgeon: Uriah Sheridan MD;  [facility]    REPAIR PTOSIS BROW BILATERAL Bilateral 06/09/2020    Procedure: BILATERAL BROW PTOSIS REPAIR;  Surgeon: Denise Alberts MD;  Location: [facility]    SACROCOLPOPEXY, CYSTOSCOPY, COMBINED N/A 02/01/2022    Procedure: Uterosacral ligament suspension, pina colposuspension with Cystoscopy;  Surgeon: Nicole Rivera MD;  [facility]    SIGMOIDOSCOPY FLEXIBLE N/A 02/01/2022    Procedure: SIGMOIDOSCOPY, FLEXIBLE;  Surgeon: Uriah Sheridan MD;  [facility]    Surgery for SBO  2015    TONSILLECTOMY, ADENOIDECTOMY, COMBINED  1997    TRANSPLANT  5/10/13    Pancreatic Auto-Islet Transplant      Allergies   Allergen Reactions    Corticosteroids Other (See Comments)     All oral, IV and injectable steroids are contraindicated (unless in life threatening situations) in Islet Auto transplant recipients. They can cause irreversible loss of islet cell function. Please contact patient's transplant care coordinator, Erlinda Multani RN BSN at [phone]/pager: [phone] and endocrinologist prior to administration.      Povidone Iodine Hives     Causes skin to blister    Nsaids      naprosyn = GI upset    Sulfasalazine Nausea and Nausea and Vomiting      Social History     Tobacco Use    Smoking status: Never    Smokeless tobacco: Never   Substance Use Topics    Alcohol use: Yes     Comment: once every 4 months      Wt Readings from Last 1 Encounters:   11/01/24 68.6 kg  (151 lb 3.8 oz)        Anesthesia Evaluation   Pt has had prior anesthetic. Type: General.    No history of anesthetic complications       ROS/MED HX  ENT/Pulmonary:     (+) sleep apnea, doesn't use CPAP,                                      Neurologic:  - neg neurologic ROS     Cardiovascular:     (+)  hypertension- -   -  - -                                      METS/Exercise Tolerance:     Hematologic:  - neg hematologic  ROS     Musculoskeletal:  - neg musculoskeletal ROS     GI/Hepatic: Comment: S/p pancreatectomy, IDDM type 1      Renal/Genitourinary:  - neg Renal ROS     Endo:     (+) type I DM,    Using insulin, - using insulin pump.         Obesity,       Psychiatric/Substance Use:  - neg psychiatric ROS     Infectious Disease: Comment: Cellulities left hand      Malignancy:  - neg malignancy ROS     Other:  - neg other ROS          Physical Exam    Airway        Mallampati: II   TM distance: > 3 FB   Neck ROM: full   Mouth opening: > 3 cm    Respiratory Devices and Support         Dental       (+) Completely normal teeth      Cardiovascular   cardiovascular exam normal       Rhythm and rate: regular and normal     Pulmonary   pulmonary exam normal        breath sounds clear to auscultation           OUTSIDE LABS:  CBC:   Lab Results   Component Value Date    WBC 11.2 (H) 11/02/2024    WBC 10.4 11/01/2024    HGB 12.2 11/02/2024    HGB 12.0 11/01/2024    HCT 37.6 11/02/2024    HCT 38.3 11/01/2024     11/02/2024     11/01/2024     BMP:   Lab Results   Component Value Date     11/01/2024     08/23/2024    POTASSIUM 3.8 11/01/2024    POTASSIUM 4.3 08/23/2024    CHLORIDE 105 11/01/2024    CHLORIDE 109 (H) 08/23/2024    CO2 24 11/01/2024    CO2 25 08/23/2024    BUN 22.2 11/01/2024    BUN 17.0 08/23/2024    CR 0.96 (H) 11/01/2024    CR 0.99 (H) 08/23/2024    GLC 91 11/02/2024     (H) 11/01/2024     COAGS:   Lab Results   Component Value Date    PTT 36 05/16/2013    INR 0.97  03/03/2024    FIBR 168 (L) 05/10/2013     POC:   Lab Results   Component Value Date     (H) 04/01/2021    HCG Negative 05/21/2014    HCGS Negative 12/30/2015     HEPATIC:   Lab Results   Component Value Date    ALBUMIN 4.1 08/22/2024    PROTTOTAL 7.0 08/22/2024    ALT 32 08/22/2024    AST 34 08/22/2024    ALKPHOS 123 08/22/2024    BILITOTAL 0.4 08/22/2024     OTHER:   Lab Results   Component Value Date    PH 7.36 05/10/2013    LACT <0.3 11/01/2024    A1C 6.6 (H) 08/23/2024    VALARIE 8.9 11/01/2024    PHOS 4.2 01/05/2023    MAG 1.7 08/22/2024    LIPASE 7 (L) 08/22/2024    AMYLASE 268 (H) 05/11/2013    TSH 0.01 (L) 04/24/2024    T4 1.81 (H) 04/24/2024    T3 90 04/24/2024    CRP 21.8 (H) 10/30/2020    SED 10 11/01/2024       Anesthesia Plan    ASA Status:  3, emergent    NPO Status:  NPO Appropriate    Anesthesia Type: General.     - Airway: LMA   Induction: Intravenous.   Maintenance: Balanced.        Consents    Anesthesia Plan(s) and associated risks, benefits, and realistic alternatives discussed. Questions answered and patient/representative(s) expressed understanding.     - Discussed: Risks, Benefits and Alternatives for the PROCEDURE were discussed     - Discussed with:  Patient       Use of blood products discussed: Yes.     - Discussed with: Patient.     - Consented: consented to blood products            Reason for refusal: other.     Postoperative Care    Pain management: IV analgesics, Oral pain medications.   PONV prophylaxis: Ondansetron (or other 5HT-3), Dexamethasone or Solumedrol     Comments:    Other Comments: S/p pancreatectomy, IDDM type 1. Insulin pump will remain on during procedure (right thigh) to secret insulin at basal rate as she is insulin-dependent. Pre-op glucose in the 80's. Patient states glucose can get as low as 20's and she remains asymptomatic.    Plan: GA LMA, dilaudid PRN, ketorolac 15 mg IV.           Kendra Chua MD    I have reviewed the pertinent notes and labs in the  "chart from the past 30 days and (re)examined the patient.  Any updates or changes from those notes are reflected in this note.                         # Overweight: Estimated body mass index is 25.96 kg/m  as calculated from the following:    Height as of this encounter: 1.626 m (5' 4\").    Weight as of this encounter: 68.6 kg (151 lb 3.8 oz)., PRESENT ON ADMISSION           "

## 2024-11-02 NOTE — PHARMACY
Pharmacy- assistance with insulin pump  S: Pt's BG has been running in the 70-80 range. She was treated today for a low BG per protocol. Target BG should be 110.     A: I interviewed pt and discovered that she had trouble entering the insulin pump program into her new Omnipod which was replaced recently. She said that she had many low BGs at home. The basal rates and insulin sensitivity were incorrectly entered.     Discussed and pt agreed to adjust the pump as follows:  Basal rate(s)  1294-6938: 0.15 units/hr  0403-9531: 0.35 units/hr    Insulin sensitivity factor changed from 50 to 100.     MD updated and agreed with the plan    Siva IssaD/BC-ADM/CDCES

## 2024-11-02 NOTE — CONSULTS
[facility]  INFECTIOUS DISEASES CONSULTATION  Lynnxavier Hallon 2024    History  60 yo woman with chronic pancreatitis,s/p pancreatectomy and pancreatic islet cell transplantation, on insulin pump, DM, splenectomy and distal gastrectomy, GDED, BLU, kidney stones  Sustained a dog bite to her finger trying to separate cat and dog on 10/31/24.  Bite to L 2nd finger of PIP,   She was seen in clinic initially and given iv unasyn x 1 and oral augmentin on discharge.   Xray showed possible bone fragment vs fb  Admitted to hospital on  with worsening status of the finger.   Up to date on tetanus. Dog has had vaccinations  Her WBC was wnl  Started on iv Unasyn  She went to surgery today     ROS: 10 point ROS neg other than the symptoms noted above in the HPI.    Past Medical History:   Diagnosis Date    Benign paroxysmal positional vertigo     occ.     Calculus of kidney 05/2005    x1 on L side passed, several stones.  Has been tested for oxalate.    Chronic abdominal pain 2013    Chronic pancreatitis 2013    Depression     also occ panic spells    Diabetes (H) 5/10/2013    Total Pancreatomy with Auto Islets Transplant    Dyspepsia 1999    H. pylori   treated    Dysthymia 2016    Headaches     still periodic HA's ;  often 5X/week    Hypertension 2016    Stress related    Iron deficiency anemia 2003    relates to gastric bypass    Post-pancreatectomy diabetes melltius 2013    Sleep apnea     uses splint    Spasm of sphincter of Oddi     surgical + endoscopic stenting of pancreatic duct @ [facility]    Thyroid nodule 2016     Past Surgical History:   Procedure Laterality Date    ABDOMINOPLASTY  2002    Tummy tuck    APPENDECTOMY  1990    BUNIONECTOMY Right 1998    CBD Stent placement  2002    CBD stent; Dr. Presley     SECTION      CHOLECYSTECTOMY      COLONOSCOPY N/A 2021    Procedure: COLONOSCOPY INCOMPLETE Aborted  due to incomplete prep  will need to take additional prep and return tomorrow 4/1/21;  Surgeon: Ihsan Saenz MD;  Location: [facility]    COMBINED CYSTOSCOPY, RETROGRADES, URETEROSCOPY, LASER HOLMIUM LITHOTRIPSY URETER(S), INSERT STENT Right 03/23/2015    Procedure: COMBINED CYSTOSCOPY, RETROGRADES, URETEROSCOPY, LASER HOLMIUM LITHOTRIPSY URETER(S), INSERT STENT;  Surgeon: Kennedi Aldana MD;  Location: [facility]    COMBINED CYSTOSCOPY, RETROGRADES, URETEROSCOPY, LASER HOLMIUM LITHOTRIPSY URETER(S), INSERT STENT Right 04/20/2015    Procedure: COMBINED CYSTOSCOPY, RETROGRADES, URETEROSCOPY, LASER HOLMIUM LITHOTRIPSY URETER(S), INSERT STENT;  Surgeon: Kennedi Aldana MD;  Location: [facility]    COSMETIC SURGERY  6/24/2002    Tummy tuck    CYSTECTOMY OVARIAN BENIGN Right     CYSTOSCOPY, RETROGRADES, INSERT STENT URETER(S), COMBINED  10/02/2012    Procedure: COMBINED CYSTOSCOPY, RETROGRADES, INSERT STENT URETER(S);  COMBINED CYSTOSCOPY,  , INSERT LEFT STENT URETER;  Surgeon: Johny Baez MD;  Location: [facility]    CYSTOSCOPY, RETROGRADES, INSERT STENT URETER(S), COMBINED Left 9/20/2022    Procedure: Cystoscopy with left retrograde pyelogram, left ureteroscopy, thulium laser lithotripsy of left ureteral calculus, stone basketing and left ureteral stent insertion;  Surgeon: Alonzo Rome MD;  Location: [facility]    ESOPHAGOSCOPY, GASTROSCOPY, DUODENOSCOPY (EGD), COMBINED N/A 01/24/2018    Procedure: COMBINED ESOPHAGOSCOPY, GASTROSCOPY, DUODENOSCOPY (EGD);  ESOPHAGOSCOPY, GASTROSCOPY, DUODENOSCOPY (EGD)    ;  Surgeon: Tamir Rodgers MD;  Location: [facility]    EXTRACORPOREAL SHOCK WAVE LITHOTRIPSY (ESWL)  10/16/2012    Procedure: EXTRACORPOREAL SHOCK WAVE LITHOTRIPSY (ESWL);  left EXTRACORPOREAL SHOCK WAVE LITHOTRIPSY (ESWL) ;  Surgeon: Johny Baez MD;  Location: [facility]    Gastric bypass NOS      HERNIA REPAIR  02/2015    HYSTERECTOMY SUPRACERVICAL, BILATERAL SALPINGO-OOPHORECTOMY, COMBINED N/A 02/01/2022     Procedure: Abdominal supracervical hysterectomy, bilateral salpingooophrectomy;  Surgeon: Nicole Rivera MD;  [facility]    IRRIGATION AND DEBRIDEMENT HAND, COMBINED Left 10/30/2020    Procedure: Left hand sharp excisional debridement of skin, subcutaneous tissue and fat with a scalpel, 2 x 1 x 1 cm.;  Surgeon: Demian Renteria MD;  Location: [facility]    LAPAROSCOPIC LYSIS ADHESIONS N/A 2015    Procedure: LAPAROSCOPIC LYSIS ADHESIONS;  Surgeon: Aaron Early MD;  [facility]    LAPAROSCOPIC LYSIS ADHESIONS N/A 2015    Procedure: LAPAROSCOPIC LYSIS ADHESIONS;  Surgeon: Aaron Early MD;  [facility]    PANCREATECTOMY, TRANSPLANT AUTO ISLET CELL, COMBINED  05/10/2013    Procedure: COMBINED PANCREATECTOMY, TRANSPLANT AUTO ISLET CELL;  Pancreatectomy, Auto Islet Cell Transplant   hernia repair, jejunostomy tube and liver biopsies with Anesthesia General with block;  Surgeon: Aaron Early MD;  [facility]    Partial ileum resection      RECTOPEXY ABDOMINAL N/A 2022    Procedure: RECTOPEXY, ABDOMINAL;  Surgeon: Uriah Sheridan MD;  [facility]    REPAIR PTOSIS BROW BILATERAL Bilateral 2020    Procedure: BILATERAL BROW PTOSIS REPAIR;  Surgeon: Denise Ablerts MD;  Location: [facility]    SACROCOLPOPEXY, CYSTOSCOPY, COMBINED N/A 2022    Procedure: Uterosacral ligament suspension, pina colposuspension with Cystoscopy;  Surgeon: Nicole Rivera MD;  [facility]    SIGMOIDOSCOPY FLEXIBLE N/A 2022    Procedure: SIGMOIDOSCOPY, FLEXIBLE;  Surgeon: Uriah Sheridan MD;  [facility]    Surgery for SBO      TONSILLECTOMY, ADENOIDECTOMY, COMBINED  1997    TRANSPLANT  5/10/13    Pancreatic Auto-Islet Transplant     Social History     Social History Narrative    Not on file     Family History   Problem Relation Age of Onset    Family History Negative Mother     Respiratory Father         COPD;  at 69     Genitourinary Problems Father         kidney stones    Substance Abuse Father     Depression Father     Asthma Father     Heart Disease Paternal Grandfather         M.I.    Coronary Artery Disease Paternal Grandfather     Hyperlipidemia Paternal Grandfather     Genitourinary Problems Brother         multiple brothers with kidney stones    Gastrointestinal Disease Maternal Grandmother         undiagnosed 'gut' issues    Coronary Artery Disease Maternal Grandfather     Hyperlipidemia Maternal Grandfather     Cerebrovascular Disease Paternal Grandmother         At the age of 103    Anxiety Disorder Paternal Grandmother     Osteoporosis Paternal Grandmother     Anxiety Disorder Son     Anxiety Disorder Daughter     Asthma Daughter     Colon Cancer No family hx of           Allergies   Allergen Reactions    Corticosteroids Other (See Comments)     All oral, IV and injectable steroids are contraindicated (unless in life threatening situations) in Islet Auto transplant recipients. They can cause irreversible loss of islet cell function. Please contact patient's transplant care coordinator, Erlinda Multani RN BSN at [phone]/pager: [phone] and endocrinologist prior to administration.      Povidone Iodine Hives     Causes skin to blister    Nsaids      naprosyn = GI upset    Sulfasalazine Nausea and Nausea and Vomiting     Current Facility-Administered Medications   Medication Dose Route Frequency Provider Last Rate Last Admin    acetaminophen (TYLENOL) tablet 650 mg  650 mg Oral Q4H PRN Nnamdi Ahumada MD   650 mg at 11/01/24 2237    Or    acetaminophen (TYLENOL) Suppository 650 mg  650 mg Rectal Q4H PRN Nnamdi Ahumada MD        acetaminophen (TYLENOL) tablet 1,000 mg  1,000 mg Oral Q8H Andrew Fowler MD   1,000 mg at 11/02/24 0359    [START ON 11/5/2024] acetaminophen (TYLENOL) tablet 650 mg  650 mg Oral Q4H PRN Mami Johnson PA-C        ampicillin-sulbactam (UNASYN) 3 g vial to attach  to  mL bag  3 g Intravenous Q6H Andrew Fowler MD   3 g at 11/02/24 1615    [START ON 11/5/2024] bisacodyl (DULCOLAX) suppository 10 mg  10 mg Rectal Daily PRN Mami Johnson PA-C        BUPivacaine (MARCAINE) 0.25 % injection    PRN Aileen Reza MD   10 mL at 11/02/24 0906    calcium carbonate (TUMS) chewable tablet 1,000 mg  1,000 mg Oral 4x Daily PRN Nnamdi Ahumada MD        ceFAZolin (ANCEF) 2 g in 100 mL D5W intermittent infusion  2 g Intravenous Pre-Op/Pre-procedure x 1 dose Mami Johnson PA-C        ceFAZolin (ANCEF) 2 g in 100 mL D5W intermittent infusion  2 g Intravenous See Admin Instructions Mami Johnson PA-C        glucose gel 15-30 g  15-30 g Oral Q15 Min PRN Nnamdi Ahumada MD        Or    dextrose 50 % injection 25-50 mL  25-50 mL Intravenous Q15 Min PRN Nnamdi Ahumada MD        Or    glucagon injection 1 mg  1 mg Subcutaneous Q15 Min PRN Nnamdi Ahumada MD        DULoxetine (CYMBALTA) DR capsule 20 mg  20 mg Oral Daily Nnamdi Ahumada MD   20 mg at 11/02/24 1654    escitalopram (LEXAPRO) tablet 20 mg  20 mg Oral Daily Nnamdi Ahumada MD   20 mg at 11/02/24 1654    famotidine (PEPCID) tablet 40 mg  40 mg Oral BID Nnamdi Ahumada MD   40 mg at 11/01/24 2017    gabapentin (NEURONTIN) capsule 300 mg  300 mg Oral BID Nnamdi Ahumada MD   300 mg at 11/01/24 2017    HYDROmorphone (DILAUDID) injection 0.2 mg  0.2 mg Intravenous Q2H PRN Nnamdi Ahumada MD   0.2 mg at 11/01/24 1307    HYDROmorphone (PF) (DILAUDID) injection 0.5 mg  0.5 mg Intravenous Q2H PRN Andrew Fowler MD        hydrOXYzine HCl (ATARAX) tablet 25 mg  25 mg Oral Daily PRN Nnamdi Ahumada MD   25 mg at 11/01/24 0539    insulin aspart (NovoLOG/FIASP) 100 UNIT/ML VIAL FOR FILLING PUMP RESERVOIR   Device See Admin Instructions Nnamdi Ahumada MD        insulin basal rate from AMBULATORY PUMP   Subcutaneous Continuous Abdissa,  Andrew BRIDGES MD   Rate Verify at 11/01/24 1101    insulin bolus from AMBULATORY PUMP   Subcutaneous 4x Daily AC & HS Andrew Fowler MD        insulin bolus from AMBULATORY PUMP   Subcutaneous TID AC Nnamdi Ahumada MD   3 Units at 11/02/24 1224    levothyroxine (SYNTHROID/LEVOTHROID) tablet 125 mcg  125 mcg Oral QAM AC Nnamdi Ahumada MD   125 mcg at 11/01/24 0923    lidocaine (LMX4) cream   Topical Q1H PRN Mami Johnson PA-C        lidocaine (LMX4) cream   Topical Q1H PRN Nnamdi Ahumada MD        lidocaine 1 % 0.1-1 mL  0.1-1 mL Other Q1H PRN Mami Johnson PA-C        lidocaine 1 % 0.1-1 mL  0.1-1 mL Other Q1H PRN Nnamdi Ahumada MD        lipase-protease-amylase (CREON 36) 17646-604438-862492 units per EC Capsule 2 capsule  2 capsule Oral With Snacks or Supplements Nnamdi Ahumada MD   2 capsule at 11/01/24 1425    lipase-protease-amylase (CREON 36) [id number] units per EC Capsule 3 capsule  3 capsule Oral TID w/meals Nnamdi Ahumada MD   3 capsule at 11/02/24 1128    loratadine (CLARITIN) tablet 10 mg  10 mg Oral Daily PRN Nnamdi Ahumada MD        [START ON 11/4/2024] magnesium hydroxide (MILK OF MAGNESIA) suspension 30 mL  30 mL Oral Daily PRN Mami Johnson PA-C        melatonin tablet 5 mg  5 mg Oral At Bedtime PRN Nnamdi Ahumada MD        naloxone (NARCAN) injection 0.2 mg  0.2 mg Intravenous Q2 Min PRN Nnamdi Ahumada MD        Or    naloxone (NARCAN) injection 0.4 mg  0.4 mg Intravenous Q2 Min PRN Nnamdi Ahumada MD        Or    naloxone (NARCAN) injection 0.2 mg  0.2 mg Intramuscular Q2 Min PRN Nnamdi Ahumada MD        Or    naloxone (NARCAN) injection 0.4 mg  0.4 mg Intramuscular Q2 Min PRN Nnamdi Ahumada MD        ondansetron (ZOFRAN ODT) ODT tab 4 mg  4 mg Oral Q6H PRN Mami Johnson PA-C        Or    ondansetron (ZOFRAN) injection 4 mg  4 mg Intravenous Q6H PRN Mami Johnson,  PIERRE        ondansetron (ZOFRAN ODT) ODT tab 4 mg  4 mg Oral Q6H PRN Nnamdi Ahumada MD        Or    ondansetron (ZOFRAN) injection 4 mg  4 mg Intravenous Q6H PRN Nnamdi Ahumada MD   4 mg at 11/02/24 0028    oxyCODONE (ROXICODONE) tablet 5 mg  5 mg Oral Q4H PRN Mami Johnson PA-C   5 mg at 11/02/24 1513    Or    oxyCODONE IR (ROXICODONE) tablet 10 mg  10 mg Oral Q4H PRN Mami Johnson PA-C        oxyCODONE (ROXICODONE) tablet 5 mg  5 mg Oral Q4H PRN Nnamdi Ahumada MD   5 mg at 11/01/24 2238    oxyCODONE IR (ROXICODONE) half-tab 2.5 mg  2.5 mg Oral Q4H PRN Nnamid Ahumada MD   2.5 mg at 11/01/24 1835    pantoprazole (PROTONIX) EC tablet 40 mg  40 mg Oral BID AC Nnamdi Ahumada MD   40 mg at 11/02/24 1615    [START ON 11/3/2024] polyethylene glycol (MIRALAX) Packet 17 g  17 g Oral Daily Mami Johnson PA-C        polyethylene glycol (MIRALAX) Packet 17 g  17 g Oral BID PRN Nnamdi Ahumada MD        potassium citrate (UROCIT-K) CR tablet 10 mEq  10 mEq Oral Daily Nnamdi Ahumada MD   10 mEq at 11/02/24 1655    prochlorperazine (COMPAZINE) injection 10 mg  10 mg Intravenous Q6H PRN Nnamdi Ahumada MD        Or    prochlorperazine (COMPAZINE) tablet 10 mg  10 mg Oral Q6H PRN Nnamdi Ahumada MD        Or    prochlorperazine (COMPAZINE) suppository 25 mg  25 mg Rectal Q12H PRN Nnamdi Ahumada MD        senna-docusate (SENOKOT-S/PERICOLACE) 8.6-50 MG per tablet 1 tablet  1 tablet Oral BID Mami Johnson PA-C   1 tablet at 11/02/24 1224    senna-docusate (SENOKOT-S/PERICOLACE) 8.6-50 MG per tablet 1 tablet  1 tablet Oral BID PRN Nnamdi Ahumada MD        Or    senna-docusate (SENOKOT-S/PERICOLACE) 8.6-50 MG per tablet 2 tablet  2 tablet Oral BID PRN Nnamdi Ahumada MD        sodium chloride (PF) 0.9% PF flush 3 mL  3 mL Intracatheter Q8H Mami Johnson PA-C        sodium chloride (PF) 0.9% PF flush 3 mL  3 mL  "Intracatheter q1 min prn Mami Johnson PA-C        sodium chloride (PF) 0.9% PF flush 3 mL  3 mL Intracatheter Q8H Nnamdi Ahumada MD   3 mL at 11/02/24 1130    sodium chloride (PF) 0.9% PF flush 3 mL  3 mL Intracatheter q1 min prn Nnamdi Ahumada MD        traZODone (DESYREL) tablet 100 mg  100 mg Oral At Bedtime PRN Nnamdi Ahumada MD               Physical Examination  BP (!) 143/57   Pulse 55   Temp 98  F (36.7  C) (Temporal)   Resp 16   Ht 1.626 m (5' 4\")   Wt 68.6 kg (151 lb 3.8 oz)   LMP 12/19/2013   SpO2 95%   BMI 25.96 kg/m    HEENT:, scleral anicteric , no scleral hemorrhages,   Op clear  Neck supple, no GABRIEL  CV: RRR  Lungs CTA bilat  Abd soft, NT, ND  Ext; no c/c/e, no splinter hemorrhages or nodes   L hand bandaged post op,     LABORATORY DATA  Lab Results   Component Value Date    WBC 11.2 11/02/2024    WBC 6.0 05/10/2021     Lab Results   Component Value Date    RBC 4.07 11/02/2024    RBC 3.78 05/10/2021     Lab Results   Component Value Date    HGB 12.2 11/02/2024    HGB 10.7 05/10/2021     Lab Results   Component Value Date    HCT 37.6 11/02/2024    HCT 34.5 05/10/2021     No components found for: \"MCT\"  Lab Results   Component Value Date    MCV 92 11/02/2024    MCV 91 05/10/2021     Lab Results   Component Value Date    MCH 30.0 11/02/2024    MCH 28.3 05/10/2021     Lab Results   Component Value Date    MCHC 32.4 11/02/2024    MCHC 31.0 05/10/2021     Lab Results   Component Value Date    RDW 14.7 11/02/2024    RDW 15.8 05/10/2021     Lab Results   Component Value Date     11/02/2024     05/10/2021     Last Comprehensive Metabolic Panel:  Sodium   Date Value Ref Range Status   11/02/2024 140 135 - 145 mmol/L Final   05/10/2021 139 133 - 144 mmol/L Final     Potassium   Date Value Ref Range Status   11/02/2024 4.9 3.4 - 5.3 mmol/L Final     Comment:     Specimen slightly hemolyzed. The reported potassium value may be falsely elevated. Analysis of a " non-hemolyzed specimen (i.e. re-draw) may result in a lower potassium value.   06/14/2022 4.0 3.4 - 5.3 mmol/L Final   05/10/2021 3.6 3.4 - 5.3 mmol/L Final     Chloride   Date Value Ref Range Status   11/02/2024 103 98 - 107 mmol/L Final   06/14/2022 106 94 - 109 mmol/L Final   05/10/2021 108 94 - 109 mmol/L Final     Carbon Dioxide   Date Value Ref Range Status   05/10/2021 28 20 - 32 mmol/L Final     Carbon Dioxide (CO2)   Date Value Ref Range Status   11/02/2024 23 22 - 29 mmol/L Final   06/14/2022 32 20 - 32 mmol/L Final     Anion Gap   Date Value Ref Range Status   11/02/2024 14 7 - 15 mmol/L Final   06/14/2022 <1 (L) 3 - 14 mmol/L Final   05/10/2021 3 3 - 14 mmol/L Final     Glucose   Date Value Ref Range Status   11/02/2024 92 70 - 99 mg/dL Final   06/14/2022 104 (H) 70 - 99 mg/dL Final   05/10/2021 169 (H) 70 - 99 mg/dL Final     GLUCOSE BY METER POCT   Date Value Ref Range Status   11/02/2024 202 (H) 70 - 99 mg/dL Final     Urea Nitrogen   Date Value Ref Range Status   11/02/2024 17.6 8.0 - 23.0 mg/dL Final   06/14/2022 21 7 - 30 mg/dL Final   05/10/2021 16 7 - 30 mg/dL Final     Creatinine   Date Value Ref Range Status   11/02/2024 0.78 0.51 - 0.95 mg/dL Final   05/10/2021 0.81 0.52 - 1.04 mg/dL Final     GFR Estimate   Date Value Ref Range Status   11/02/2024 86 >60 mL/min/1.73m2 Final     Comment:     eGFR calculated using 2021 CKD-EPI equation.   05/10/2021 80 >60 mL/min/[1.73_m2] Final     Comment:     Non  GFR Calc  Starting 12/18/2018, serum creatinine based estimated GFR (eGFR) will be   calculated using the Chronic Kidney Disease Epidemiology Collaboration   (CKD-EPI) equation.       GFR, ESTIMATED POCT   Date Value Ref Range Status   11/23/2021 35 (L) >60 mL/min/1.73m2 Final     Calcium   Date Value Ref Range Status   11/02/2024 8.7 (L) 8.8 - 10.4 mg/dL Final     Comment:     Reference intervals for this test were updated on 7/16/2024 to reflect our healthy population more  accurately. There may be differences in the flagging of prior results with similar values performed with this method. Those prior results can be interpreted in the context of the updated reference intervals.   05/10/2021 9.0 8.5 - 10.1 mg/dL Final     Bilirubin Total   Date Value Ref Range Status   08/22/2024 0.4 <=1.2 mg/dL Final   05/10/2021 0.3 0.2 - 1.3 mg/dL Final     Alkaline Phosphatase   Date Value Ref Range Status   08/22/2024 123 40 - 150 U/L Final   05/10/2021 132 40 - 150 U/L Final     ALT   Date Value Ref Range Status   08/22/2024 32 0 - 50 U/L Final   05/10/2021 35 0 - 50 U/L Final     AST   Date Value Ref Range Status   08/22/2024 34 0 - 45 U/L Final   05/10/2021 24 0 - 45 U/L Final         MICROBIOLOGY  11/2/24 surg cx finger      IMPRESSION AND PLAN  60 yo woman  S/p dog bite 3 days ago to her L index finger  Tenosynovitis, rule out septic joint    S/p dog bite L hand  Tenosynovitis L 2nd finger  Rule out septic joints  Islet cell transplant after pancreatectomy for chronic pancreatitis    Surgery today should help us get on top of this infection  Iv Unasyn remains the antibiotic option of choice in most animal bites, including dogs  Oral augmentin is still the appropriate oral conversion but she wonders about absorption with her pancreatic enzyme needs and we may need to stay with iv therapy for a bit if there is a septic joint  Once daily iv ertapenem may be an option on discharge if we stay with iv therapy    Recommendations  Continue iv Unasyn 3 g q 6 hrs now  Follow up on op cx and surgical findings of joint infection or not  Final discharge abx to be determined    Thank you very much for this consultation      Alexis Russo MD

## 2024-11-02 NOTE — PROVIDER NOTIFICATION
MD Notification    Notified Person: MDA    Notified Person Name: Kendra Chua     Notification Date/Time: 11/2/24 0928    Notification Interaction: page     Purpose of Notification: BG on arrival to PACU. Given juice with re-check 15 min after initial check and BG 74.    Orders Received: Continue to monitor for s/sx of low BG. No need to re-check. Continue with snacks and juice per pt requests.     Comments: Patient stable and no s/sx of low BG present

## 2024-11-02 NOTE — PHARMACY-ADMISSION MEDICATION HISTORY
Updated insulin pump settings    ELF  INSULIN PUMP - OUTPATIENT     Inject subcutaneously continuously. Date Last Updated on chart: 2024  Omnipod 5, type of insulin: Fiasp  Change site every 3 days  Basal Rates in units/hr  3523-7763: 0.15 unit/hr  5038-7567: 0.35 units/hr  ICF (insulin to carb ratio): 10 (1 unit covers 10g carbs)  ISF (insulin sensitivity factor): 100 (1 unit lowers BG by 100mg/dL)  Target B  Active insulin time: 4 hrs ( ) Yes  ( ) No  Reconciliation Not

## 2024-11-02 NOTE — ANESTHESIA POSTPROCEDURE EVALUATION
Patient: Lynn Thompson    Procedure: Procedure(s):  Incision and drainage left indef finger finger and PIP joint irrigation       Anesthesia Type:  General    Note:  Disposition: Inpatient   Postop Pain Control: Uneventful            Sign Out: Well controlled pain   PONV: No   Neuro/Psych: Uneventful            Sign Out: Acceptable/Baseline neuro status   Airway/Respiratory: Uneventful            Sign Out: Acceptable/Baseline resp. status   CV/Hemodynamics: Uneventful            Sign Out: Acceptable CV status; No obvious hypovolemia; No obvious fluid overload   Other NRE: NONE   DID A NON-ROUTINE EVENT OCCUR? No           Last vitals:  Vitals Value Taken Time   /79 11/02/24 0930   Temp 97.34  F (36.3  C) 11/02/24 0939   Pulse 87 11/02/24 0939   Resp 31 11/02/24 0939   SpO2 95 % 11/02/24 0939   Vitals shown include unfiled device data.    Electronically Signed By: Kendra Chua MD  November 2, 2024  9:41 AM

## 2024-11-02 NOTE — PROGRESS NOTES
[facility]  Hospitalist Progress Note  Andrew Fowler M.D., M.B.A.   11/02/2024    Reason for Stay/active problem list    Left index finger dog bite with cellulitis and concern for tenosynovitis         Assessment and Plan:        Summary of Stay: Lynn Thompson is a 59 year old female with PMH including chronic pancreatitis now s/p pancreatectomy with pancreatic islet cell transplant now on insulin pump for DM1, distal gastrectomy, splenectomy, hypothyroidism, GERD, BLU, and nephrolithiasis who presents with finger pain and swelling after a dog bite.     Problem List with Assessment and Plan:      Dog bite  Left second finger cellulitis  left index PIP joint traumatic arthrotomy with open fracture  Possible left second PIP tenosynovitis:  -- She tried to separate her cat and dog who were fighting this morning around 8 AM when her dog bit her on her left second finger and there is a puncture wound over the posterior PIP and the anterior soft tissue.  She received IV Unasyn and was sent home on Augmentin earlier today, but has had significant increase swelling and pain at the PIP now spreading up into the hand consistent with cellulitis and concern for possible tenosynovitis as well.  She cannot flex the PIP joint without severe pain.  X-ray earlier did show a 3 mm radiodensity which could be a bone fragment or foreign body which I would presume would be a dog tooth.  She is updated on tetanus and the dog is up-to-date on vaccinations.  She has had a hand infection from a dog bite in the past requiring surgery some years ago.  Her WBC count is 10.4 and CRP/ESR not yet elevated.  She is afebrile and vital signs are stable.  -Patient was admitted and was treated with Unasyn IV and orthopedic surgery consulted.  -Patient had incision and debridement by Dr. Reza on November 2, 2024.  Postoperative diagnosis was left index PIP joint traumatic arthrotomy with open fracture.  The following  procedures were done:  Procedure:   Left index finger incision and debridement to the level of bone, dorsally, 2cm2.  Left index finger PIP joint irrigation.  Left index finger incision and debridement to the level of the flexor tendon sheath, 3 cm2.  Left index finger A1 pulley release.      -- Patient is doing well postoperatively.  Plan to continue antibiotic, wound care, postoperative care.  Will involve infectious disease service for further antibiotic therapy.    IDDM type I  Chronic pancreatitis s/p pancreatectomy and pancreatic islet cell transplant:   -She had previous pancreatectomy, distal gastrectomy, splenectomy, gastrojejunostomy, limited bowel resection, and jejunojejunostomy for chronic pancreatitis.  She uses an insulin pump Omnipod 5 at home that is currently in place.  She is alert and oriented and capable of managing her own pump while here.  She is very insulin sensitive and does have a tendency to have hypoglycemia per her own account.  She states her pump does make a lot of adjustments for hypo-/hyperglycemia.  -Continue home insulin pump, but monitor for hypoglycemia, will check POC glucose every 4 hours while NPO and she will notify us if her continuous glucose monitor is reading hypoglycemia or significant hyperglycemia  -Hypoglycemia protocol  -Resume Viokase      MDD: Resumed PTA duloxetine, escitalopram, and trazodone at bedtime.     GERD: Used pantoprazole 40 mg twice daily in place of her omeprazole and continue famotidine 40 mg twice daily.     BLU: Resumed CPAP if uses at home.     Hypothyroidism: Resumed PTA levothyroxine.      VTE Prophylaxis: Ambulate every shift  Code Status: Full Code  Diet: Advance Diet as Tolerated: Regular Diet Adult    Gonzalez Catheter: Not present          Plan for today:  Continue IV antibiotic, home medications reviewed, ID consult      Anticipated Disposition : Home       Medically Ready for Discharge: Anticipated in 2-4 Days  : Pending progress and  "antibiotic plan          Interval History (Subjective):        Patient is seen and examined by me today and medical record reviewed.Overnight events noted and care discussed with nursing staff.  Patient was seen in PACU postoperatively.  She is doing well with no significant pain.  No chest pain or shortness of breath.  No fever.  Care plan discussed with bedside nurse.         Physical Exam:        Last Vital Signs:  BP (!) 144/58 (BP Location: Right arm)   Pulse 70   Temp 98.1  F (36.7  C) (Temporal)   Resp 12   Ht 1.626 m (5' 4\")   Wt 68.6 kg (151 lb 3.8 oz)   LMP 12/19/2013   SpO2 93%   BMI 25.96 kg/m      I/O last 3 completed shifts:  In: 120 [P.O.:120]  Out: -     Wt Readings from Last 5 Encounters:   11/01/24 68.6 kg (151 lb 3.8 oz)   10/31/24 61.2 kg (135 lb)   08/22/24 61.5 kg (135 lb 8 oz)   05/02/24 58.1 kg (128 lb)   04/12/24 56.7 kg (125 lb)        Constitutional: Awake, alert, cooperative, no apparent distress     Respiratory: Clear to auscultation bilaterally, no crackles or wheezing   Cardiovascular: Regular rate and rhythm, normal S1 and S2, and no murmur noted   Abdomen: Normal bowel sounds, soft, non-distended, non-tender   Skin: No new rashes, no cyanosis, dry to touch   Neuro: Alert with  no new focal weakness   Extremities: Dressed left hand   Other(s):        All other systems: Negative          Medications:        All current medications were reviewed with changes reflected in problem list.         Data:      All new lab and imaging data was reviewed.      Data reviewed today: I reviewed all new labs and imaging results over the last 24 hours. I personally reviewed       Recent Labs   Lab 11/02/24 0627 11/01/24 0327   WBC 11.2* 10.4   HGB 12.2 12.0   HCT 37.6 38.3   MCV 92 93    349     No results for input(s): \"CULT\" in the last 168 hours.  Recent Labs   Lab 11/02/24  1000 11/02/24  0924 11/02/24  0911 11/02/24  0627 11/01/24  0747 11/01/24  0327   NA  --   --   --  140  --  " "142   POTASSIUM  --   --   --  4.9  --  3.8   CHLORIDE  --   --   --  103  --  105   CO2  --   --   --  23  --  24   ANIONGAP  --   --   --  14  --  13   * 74 66* 92   < > 74   BUN  --   --   --  17.6  --  22.2   CR  --   --   --  0.78  --  0.96*   GFRESTIMATED  --   --   --  86  --  67   VALARIE  --   --   --  8.7*  --  8.9    < > = values in this interval not displayed.       Recent Labs   Lab 11/02/24  1000 11/02/24  0924 11/02/24  0911 11/02/24  0627 11/02/24  0133   * 74 66* 92 91       No results for input(s): \"INR\" in the last 168 hours.      No results for input(s): \"TROPONIN\", \"TROPI\", \"TROPR\" in the last 168 hours.    Invalid input(s): \"TROP\", \"TROPONINIES\"    No results found for this or any previous visit (from the past 48 hours).    COVID Status:  COVID-19 PCR Results          6/30/2023    22:38 1/4/2024    01:19 3/3/2024    23:37 3/4/2024    10:16   COVID-19 PCR Results   SARS CoV2 PCR Negative  Negative  Negative  Negative      COVID-19 Antibody Results, Testing for Immunity           No data to display                 Disclaimer: This note consists of symbols derived from keyboarding, dictation and/or voice recognition software. As a result, there may be errors in the script that have gone undetected. Please consider this when interpreting information found in this chart.    "

## 2024-11-02 NOTE — OP NOTE
"Date of Service: November 2, 2024    Pre-Operative Diagnosis:   Left index finger dog bite.  Concern for left index finger flexor tenosynovitis    Post-Operative Diagnosis:   Left index finger dog bite.  Left index finger PIP joint traumatic arthrotomy with open fracture.    Procedure:   Left index finger incision and debridement to the level of bone, dorsally, 2cm2.  Left index finger PIP joint irrigation.  Left index finger incision and debridement to the level of the flexor tendon sheath, 3 cm2.  Left index finger A1 pulley release.    Attending Surgeon: Aileen Reza MD    Assistant: Mami Johnson PA-C    Anesthesia: General with LMA plus local consisting of 10 ml 0.25% Marcaine plain.    Estimated Blood Loss: 2 cc    Tourniquet Time: 17 minutes.    Specimens: Culture swabs and soft tissue were sent from the dorsal PIP joint for gram stain, aerobic, and anaerobic cultures.    Drains: 1/4\" packing plain x 1 dorsally.    Implants: None.    Complications: None apparent.    Brief History: Lynn Thompson is a 59 year old, right handed female who suffered a left index finger cat bite on Thursday, October 31, 2024 while trying to break up a fight between her cat and rescue dog.  She placed her hand in the dogs mouth to open his jaw and she was inadvertently bit.  She presented initially to the ED that morning and was given a dose of IV Unasyn and discharged on oral Augmentin.  However, the pain and swelling increased and she returned to the ED early on November 1, 2024.  She was again started on IV antibiotics and admitted for further surgical intervention.  Her clinical examination is concerning for evolving flexor tenosynovitis with dorsal cellulitis.  I have recommended incision and debridement of the left index finger with possible flexor tendon sheath irrigation.  I described the procedure, postoperative protocol, and the expected outcomes.  I have also discussed the risks to include bleeding, infection, " nerve or vessel damage, wound healing problems, finger stiffness, persistent infection, the possibility that she could require further surgery.  Anesthetic risks are rare, but include breathing problems, heart problems, stroke, death.  After full discussion of the risks, benefits, and alternatives to surgery, the patient has elected to proceed, and informed consent was obtained.    Intraoperative Findings: The volar soft tissues were benign.  The A1 pulley was incised, and the finger was brought through passive range of motion.  There was trace fluid within the flexor tendon sheath and no evidence for purulence.  Dorsally, purulence was encountered as the wound was opened at the PIP joint.  It was noted that there was a traumatic arthrotomy along the radial aspect of the proximal phalangeal head.  There was evidence for fracture with exposure of the cancellous bone, consistent with the location of the bone fragment noted on radiographs.    Description of Procedure: The patient was identified in the preoperative holding area.  Her consent was reviewed and signed, and her operative site was identified and marked.  Her history and physical were updated.  She was brought to the operating room and transferred to the operating table in a supine position.  She underwent induction with general anesthesia.  A tourniquet was applied to her left upper arm and her arm was placed onto an arm table.  All bony prominences were well-padded.  The left arm was prepped and draped in the standard, sterile fashion.  A timeout was performed verifying the correct patient, procedure, site, and side.  Preoperative, prophylactic antibiotics were administered.  Incisions were templated.  The arm was elevated, and an Esmarch was used along the forearm and upper arm to exsanguinate the limb.  The tourniquet was inflated to 250 mmHg.  A volar Hermelinda incision was made over the A1 pulley and proximal phalanx of the index finger, apex ulnar.  This  was taken through skin only, and incorporated the volar puncture wound over the proximal phalanx.  Blunt dissection was performed and full-thickness skin flaps were elevated.  The neurovascular bundles remained intact.  The flexor tendon sheath was identified and noted to have minimal fluid.  There was no purulence in the soft tissues.  The A1 pulley was exposed and all overlying soft tissue was bluntly freed.  The A1 pulley was incised longitudinally, and the prefascial was released under direct visualization.  The A2 pulley remained intact.  The finger was brought through passive range of motion, and only trace fluid was encountered.  The hand was rotated, and a curvilinear incision was made over the dorsal aspect of the PIP joint, incorporating the previous laceration.  This had a proximal radial limb and a distal ulnar limb.  Blunt dissection was performed into the wound, and purulence was encountered.  Culture swabs and soft tissue were obtained and sent for Gram stain, aerobic, and anaerobic cultures.  At this point it was noted that there was a traumatic arthrotomy into the PIP joint at the level of the proximal phalangeal head.  There was also exposed cancellous bone consistent with open fracture and bite wound to the radial aspect of the phalangeal head.  A synovial rongeur was utilized to sharply debride and excise inflammatory and infected tissue.  A curette was used to debride the exposed cancellous bone.  The loose fragment was not identified.  Following debridement, both wounds were thoroughly irrigated with 1 L of antibiotic saline solution.  Wounds were explored, no further inflammation or infection was encountered.  The tourniquet was deflated, and hemostasis was achieved.  The volar incision was closed with interrupted 4-0 nylon.  The dorsal wound was closed loosely with 4-0 nylon.  The central portion of the dorsal wound was packed with a single strip of quarter inch packing plain.  A digital  block was performed proximal to her areas of erythema utilizing 10 cc of 0.25% Marcaine plain.  Soft, sterile dressings were applied followed by lightly wrapped Ace wrap.  The patient tolerated the procedure well, there were no immediate complications.  She was awakened and brought to the recovery room in stable condition.  All sponge and needle counts were correct at the end of the case.    Post-Operative Plan: The patient will return to the inpatient floor for continued IV antibiotics.  We will plan to consult our infectious disease colleagues for further recommendations regarding antibiotic management and discharge plans given the joint infection.  I do not plan any return to the OR at this time, but will continue to follow her clinical progress.  I anticipate discontinuing the packing at 24 hours and initiating daily soaks.      Aileen Reza MD

## 2024-11-02 NOTE — ANESTHESIA PROCEDURE NOTES
Airway       Patient location during procedure: OR  Staff -        CRNA: Alan Madera APRN CRNA       Performed By: CRNA  Consent for Airway        Urgency: elective  Indications and Patient Condition       Indications for airway management: sammy-procedural       Induction type:intravenous       Mask difficulty assessment: 1 - vent by mask    Final Airway Details       Final airway type: supraglottic airway    Supraglottic Airway Details        Type: LMA       Brand: I-Gel       LMA size: 4    Post intubation assessment        Placement verified by: capnometry, equal breath sounds and chest rise        Number of attempts at approach: 1       Number of other approaches attempted: 0       Secured with: tape       Ease of procedure: easy       Dentition: Intact and Unchanged

## 2024-11-03 LAB
ANION GAP SERPL CALCULATED.3IONS-SCNC: 9 MMOL/L (ref 7–15)
BUN SERPL-MCNC: 16.2 MG/DL (ref 8–23)
CALCIUM SERPL-MCNC: 9 MG/DL (ref 8.8–10.4)
CHLORIDE SERPL-SCNC: 103 MMOL/L (ref 98–107)
CREAT SERPL-MCNC: 0.91 MG/DL (ref 0.51–0.95)
EGFRCR SERPLBLD CKD-EPI 2021: 71 ML/MIN/1.73M2
ERYTHROCYTE [DISTWIDTH] IN BLOOD BY AUTOMATED COUNT: 14.3 % (ref 10–15)
GLUCOSE BLDC GLUCOMTR-MCNC: 100 MG/DL (ref 70–99)
GLUCOSE BLDC GLUCOMTR-MCNC: 119 MG/DL (ref 70–99)
GLUCOSE BLDC GLUCOMTR-MCNC: 160 MG/DL (ref 70–99)
GLUCOSE BLDC GLUCOMTR-MCNC: 242 MG/DL (ref 70–99)
GLUCOSE SERPL-MCNC: 155 MG/DL (ref 70–99)
HCO3 SERPL-SCNC: 28 MMOL/L (ref 22–29)
HCT VFR BLD AUTO: 33.5 % (ref 35–47)
HGB BLD-MCNC: 10.8 G/DL (ref 11.7–15.7)
MCH RBC QN AUTO: 29.9 PG (ref 26.5–33)
MCHC RBC AUTO-ENTMCNC: 32.2 G/DL (ref 31.5–36.5)
MCV RBC AUTO: 93 FL (ref 78–100)
PLATELET # BLD AUTO: 332 10E3/UL (ref 150–450)
POTASSIUM SERPL-SCNC: 4.1 MMOL/L (ref 3.4–5.3)
RBC # BLD AUTO: 3.61 10E6/UL (ref 3.8–5.2)
SODIUM SERPL-SCNC: 140 MMOL/L (ref 135–145)
WBC # BLD AUTO: 10.3 10E3/UL (ref 4–11)

## 2024-11-03 PROCEDURE — 250N000013 HC RX MED GY IP 250 OP 250 PS 637: Performed by: INTERNAL MEDICINE

## 2024-11-03 PROCEDURE — 250N000011 HC RX IP 250 OP 636: Performed by: INTERNAL MEDICINE

## 2024-11-03 PROCEDURE — 250N000013 HC RX MED GY IP 250 OP 250 PS 637: Performed by: STUDENT IN AN ORGANIZED HEALTH CARE EDUCATION/TRAINING PROGRAM

## 2024-11-03 PROCEDURE — 120N000001 HC R&B MED SURG/OB

## 2024-11-03 PROCEDURE — 85018 HEMOGLOBIN: CPT | Performed by: INTERNAL MEDICINE

## 2024-11-03 PROCEDURE — 99232 SBSQ HOSP IP/OBS MODERATE 35: CPT | Performed by: INTERNAL MEDICINE

## 2024-11-03 PROCEDURE — 80048 BASIC METABOLIC PNL TOTAL CA: CPT | Performed by: INTERNAL MEDICINE

## 2024-11-03 PROCEDURE — 36415 COLL VENOUS BLD VENIPUNCTURE: CPT | Performed by: INTERNAL MEDICINE

## 2024-11-03 RX ADMIN — OXYCODONE HYDROCHLORIDE 10 MG: 10 TABLET ORAL at 02:39

## 2024-11-03 RX ADMIN — POTASSIUM CITRATE 10 MEQ: 10 TABLET, EXTENDED RELEASE ORAL at 09:13

## 2024-11-03 RX ADMIN — TRAZODONE HYDROCHLORIDE 100 MG: 100 TABLET ORAL at 22:09

## 2024-11-03 RX ADMIN — OXYCODONE HYDROCHLORIDE 10 MG: 10 TABLET ORAL at 14:17

## 2024-11-03 RX ADMIN — GABAPENTIN 300 MG: 300 CAPSULE ORAL at 20:22

## 2024-11-03 RX ADMIN — FAMOTIDINE 40 MG: 20 TABLET ORAL at 09:07

## 2024-11-03 RX ADMIN — GABAPENTIN 300 MG: 300 CAPSULE ORAL at 09:08

## 2024-11-03 RX ADMIN — AMPICILLIN SODIUM AND SULBACTAM SODIUM 3 G: 2; 1 INJECTION, POWDER, FOR SOLUTION INTRAMUSCULAR; INTRAVENOUS at 03:46

## 2024-11-03 RX ADMIN — FAMOTIDINE 40 MG: 20 TABLET ORAL at 20:23

## 2024-11-03 RX ADMIN — LEVOTHYROXINE SODIUM 125 MCG: 0.03 TABLET ORAL at 09:07

## 2024-11-03 RX ADMIN — PANCRELIPASE 3 CAPSULE: 36000; 180000; 114000 CAPSULE, DELAYED RELEASE PELLETS ORAL at 13:06

## 2024-11-03 RX ADMIN — ESCITALOPRAM OXALATE 20 MG: 5 TABLET, FILM COATED ORAL at 09:07

## 2024-11-03 RX ADMIN — AMPICILLIN SODIUM AND SULBACTAM SODIUM 3 G: 2; 1 INJECTION, POWDER, FOR SOLUTION INTRAMUSCULAR; INTRAVENOUS at 16:08

## 2024-11-03 RX ADMIN — AMPICILLIN SODIUM AND SULBACTAM SODIUM 3 G: 2; 1 INJECTION, POWDER, FOR SOLUTION INTRAMUSCULAR; INTRAVENOUS at 22:07

## 2024-11-03 RX ADMIN — PANTOPRAZOLE SODIUM 40 MG: 40 TABLET, DELAYED RELEASE ORAL at 09:08

## 2024-11-03 RX ADMIN — SENNOSIDES AND DOCUSATE SODIUM 1 TABLET: 8.6; 5 TABLET ORAL at 20:22

## 2024-11-03 RX ADMIN — DULOXETINE HYDROCHLORIDE 20 MG: 20 CAPSULE, DELAYED RELEASE ORAL at 09:08

## 2024-11-03 RX ADMIN — ACETAMINOPHEN 1000 MG: 500 TABLET, FILM COATED ORAL at 13:06

## 2024-11-03 RX ADMIN — AMPICILLIN SODIUM AND SULBACTAM SODIUM 3 G: 2; 1 INJECTION, POWDER, FOR SOLUTION INTRAMUSCULAR; INTRAVENOUS at 09:12

## 2024-11-03 RX ADMIN — PANTOPRAZOLE SODIUM 40 MG: 40 TABLET, DELAYED RELEASE ORAL at 16:08

## 2024-11-03 RX ADMIN — POLYETHYLENE GLYCOL 3350 17 G: 17 POWDER, FOR SOLUTION ORAL at 09:08

## 2024-11-03 RX ADMIN — ACETAMINOPHEN 1000 MG: 500 TABLET, FILM COATED ORAL at 03:46

## 2024-11-03 RX ADMIN — PANCRELIPASE 3 CAPSULE: 36000; 180000; 114000 CAPSULE, DELAYED RELEASE PELLETS ORAL at 17:39

## 2024-11-03 RX ADMIN — ACETAMINOPHEN 1000 MG: 500 TABLET, FILM COATED ORAL at 20:22

## 2024-11-03 RX ADMIN — SENNOSIDES AND DOCUSATE SODIUM 1 TABLET: 8.6; 5 TABLET ORAL at 09:08

## 2024-11-03 RX ADMIN — OXYCODONE HYDROCHLORIDE 10 MG: 10 TABLET ORAL at 18:22

## 2024-11-03 RX ADMIN — PANCRELIPASE 3 CAPSULE: 36000; 180000; 114000 CAPSULE, DELAYED RELEASE PELLETS ORAL at 09:08

## 2024-11-03 RX ADMIN — OXYCODONE HYDROCHLORIDE 10 MG: 10 TABLET ORAL at 09:08

## 2024-11-03 ASSESSMENT — ACTIVITIES OF DAILY LIVING (ADL)
ADLS_ACUITY_SCORE: 0
DEPENDENT_IADLS:: INDEPENDENT
ADLS_ACUITY_SCORE: 0

## 2024-11-03 NOTE — PLAN OF CARE
"Pt returned back from surgery @ 1030. A&O x4. PO oxycodone given for pain. CMS: numbness to L index finger. Dressing CDI. Up independently. Voiding. Tolerating regular diet. Plan is home @ discharge.     Problem: Adult Inpatient Plan of Care  Goal: Plan of Care Review  Description: The Plan of Care Review/Shift note should be completed every shift.  The Outcome Evaluation is a brief statement about your assessment that the patient is improving, declining, or no change.  This information will be displayed automatically on your shift  note.  Outcome: Progressing  Flowsheets (Taken 11/2/2024 1900)  Plan of Care Reviewed With: patient  Overall Patient Progress: improving  Goal: Patient-Specific Goal (Individualized)  Description: You can add care plan individualizations to a care plan. Examples of Individualization might be:  \"Parent requests to be called daily at 9am for status\", \"I have a hard time hearing out of my right ear\", or \"Do not touch me to wake me up as it startles  me\".  Outcome: Progressing  Goal: Absence of Hospital-Acquired Illness or Injury  Outcome: Progressing  Intervention: Identify and Manage Fall Risk  Recent Flowsheet Documentation  Taken 11/2/2024 1100 by Marissa Tee RN  Safety Promotion/Fall Prevention:   nonskid shoes/slippers when out of bed   safety round/check completed  Intervention: Prevent Infection  Recent Flowsheet Documentation  Taken 11/2/2024 1100 by Marissa Tee RN  Infection Prevention: rest/sleep promoted  Goal: Optimal Comfort and Wellbeing  Outcome: Progressing  Intervention: Monitor Pain and Promote Comfort  Recent Flowsheet Documentation  Taken 11/2/2024 1513 by Marissa Tee RN  Pain Management Interventions: medication (see MAR)  Taken 11/2/2024 1046 by Marissa Tee, RN  Pain Management Interventions: declines  Goal: Readiness for Transition of Care  Outcome: Progressing     Problem: Skin or Soft Tissue Infection  Goal: Absence of Infection Signs and " Symptoms  Outcome: Progressing  Intervention: Minimize and Manage Infection Progression  Recent Flowsheet Documentation  Taken 11/2/2024 1100 by Marissa Tee RN  Infection Prevention: rest/sleep promoted  Infection Management: aseptic technique maintained     Problem: Pain Acute  Goal: Optimal Pain Control and Function  Outcome: Progressing  Intervention: Develop Pain Management Plan  Recent Flowsheet Documentation  Taken 11/2/2024 1513 by Marissa Tee RN  Pain Management Interventions: medication (see MAR)  Taken 11/2/2024 1046 by Marissa Tee RN  Pain Management Interventions: declines  Intervention: Prevent or Manage Pain  Recent Flowsheet Documentation  Taken 11/2/2024 1100 by Marissa Tee RN  Medication Review/Management: medications reviewed     Problem: Comorbidity Management  Goal: Maintenance of Asthma Control  Outcome: Progressing  Intervention: Maintain Asthma Symptom Control  Recent Flowsheet Documentation  Taken 11/2/2024 1100 by Marissa Tee RN  Medication Review/Management: medications reviewed  Goal: Maintenance of Behavioral Health Symptom Control  Outcome: Progressing  Intervention: Maintain Behavioral Health Symptom Control  Recent Flowsheet Documentation  Taken 11/2/2024 1100 by Marissa Tee RN  Medication Review/Management: medications reviewed  Goal: Maintenance of COPD Symptom Control  Outcome: Progressing  Intervention: Maintain COPD Symptom Control  Recent Flowsheet Documentation  Taken 11/2/2024 1100 by Marissa Tee RN  Medication Review/Management: medications reviewed  Goal: Blood Glucose Levels Within Targeted Range  Outcome: Progressing  Intervention: Monitor and Manage Glycemia  Recent Flowsheet Documentation  Taken 11/2/2024 1100 by Marissa Tee RN  Medication Review/Management: medications reviewed  Goal: Maintenance of Heart Failure Symptom Control  Outcome: Progressing  Intervention: Maintain Heart Failure Management  Recent Flowsheet  Documentation  Taken 11/2/2024 1100 by Marissa Tee RN  Medication Review/Management: medications reviewed  Goal: Blood Pressure in Desired Range  Outcome: Progressing  Intervention: Maintain Blood Pressure Management  Recent Flowsheet Documentation  Taken 11/2/2024 1100 by Marissa Tee RN  Medication Review/Management: medications reviewed  Goal: Maintenance of Osteoarthritis Symptom Control  Outcome: Progressing  Intervention: Maintain Osteoarthritis Symptom Control  Recent Flowsheet Documentation  Taken 11/2/2024 1100 by Marissa Tee RN  Medication Review/Management: medications reviewed  Taken 11/2/2024 1046 by Marissa Tee RN  Activity Management: up ad xenia  Goal: Bariatric Home Regimen Maintained  Outcome: Progressing  Intervention: Maintain and Manage Postbariatric Surgery Care  Recent Flowsheet Documentation  Taken 11/2/2024 1100 by Marissa Tee RN  Medication Review/Management: medications reviewed  Goal: Maintenance of Seizure Control  Outcome: Progressing  Intervention: Maintain Seizure Symptom Control  Recent Flowsheet Documentation  Taken 11/2/2024 1100 by Marissa Tee RN  Medication Review/Management: medications reviewed     Problem: Infection  Goal: Absence of Infection Signs and Symptoms  Outcome: Progressing  Intervention: Prevent or Manage Infection  Recent Flowsheet Documentation  Taken 11/2/2024 1100 by Marissa Tee RN  Infection Management: aseptic technique maintained   Goal Outcome Evaluation:      Plan of Care Reviewed With: patient    Overall Patient Progress: improvingOverall Patient Progress: improving

## 2024-11-03 NOTE — PROGRESS NOTES
Orthopedic Surgery  Lynn Thompson  11/03/2024     Admit Date:  11/1/2024    POD: 1 Day Post-Op   Left index finger irrigation and debridement     Patient resting comfortably in bed.    Pain controlled.  Tolerating oral intake.    Denies nausea or vomiting.  Denies chest pain or shortness of breath.  No acute events overnight.    Temp:  [97.2  F (36.2  C)-98.1  F (36.7  C)] 97.9  F (36.6  C)  Pulse:  [55-90] 60  Resp:  [12-16] 16  BP: (115-149)/(50-93) 130/50  SpO2:  [93 %-100 %] 94 %    Alert and oriented.     LEFT INDEX FINGER:   Left hand dressing is clean, dry, and intact.   Minimal erythema of the surrounding skin.   Active and passive flexion of the index finger with minimal pain.   Incision is well approximated with Nylon sutures.   Capillary refill < 3 seconds.   Full bilateral shoulder, elbow range of motion.  5/5 finger abduction, EPL, FPL.  Sensation is intact to light touch in the median, ulnar, and radial nerve distributions.      Labs/Imaging:  Recent Labs   Lab Test 11/03/24  0734 11/02/24  0627 11/01/24  0327   WBC 10.3 11.2* 10.4   HGB 10.8* 12.2 12.0    360 349     Recent Labs   Lab Test 03/03/24  2046   INR 0.97     Recent Labs   Lab Test 11/01/24  0327   CRPI <3.00       A/P    1.   -Continue ambulation for DVT prophylaxis.    -Continue current pain regimen.  -Continue IV Unasyn per infectious disease. Will plan for outpatient oral vs. IV antibiotics per infectious disease. Consider social work consult to help plan IV outpatient antibiotics.   -Dressings: Change dressing daily to perform wound checks. Please contact team if warmth or erythema is spreading.   - Packing removed today.   - Gentle finger range of motion encouraged. No lifting, pushing, pulling with the left upper extremity.   -Follow-up: Wednesday post-op with PIERRE Bolaños. May call [phone] to schedule.     2. Disposition  -Anticipate d/c to home when medically cleared and plan for outpatient antibiotics in place.      Mami Johnson PA-C  [facility]

## 2024-11-03 NOTE — PROGRESS NOTES
[facility]  Hospitalist Progress Note  Andrew Fowler M.D., M.B.A.   11/03/2024    Reason for Stay/active problem list    Left index finger dog bite with cellulitis and concern for tenosynovitis         Assessment and Plan:        Summary of Stay: Lynn Thompson is a 59 year old female with PMH including chronic pancreatitis now s/p pancreatectomy with pancreatic islet cell transplant now on insulin pump for DM1, distal gastrectomy, splenectomy, hypothyroidism, GERD, BLU, and nephrolithiasis who presents with finger pain and swelling after a dog bite.     Problem List with Assessment and Plan:      Dog bite  Left second finger cellulitis  left index PIP joint traumatic arthrotomy with open fracture  Possible left second PIP tenosynovitis:  -- She tried to separate her cat and dog who were fighting this morning around 8 AM when her dog bit her on her left second finger and there is a puncture wound over the posterior PIP and the anterior soft tissue.  She received IV Unasyn and was sent home on Augmentin earlier today, but has had significant increase swelling and pain at the PIP now spreading up into the hand consistent with cellulitis and concern for possible tenosynovitis as well.  She cannot flex the PIP joint without severe pain.  X-ray earlier did show a 3 mm radiodensity which could be a bone fragment or foreign body which I would presume would be a dog tooth.  She is updated on tetanus and the dog is up-to-date on vaccinations.  She has had a hand infection from a dog bite in the past requiring surgery some years ago.  Her WBC count is 10.4 and CRP/ESR not yet elevated.  She is afebrile and vital signs are stable.  -Patient was admitted and was treated with Unasyn IV and orthopedic surgery consulted.  -Patient had incision and debridement by Dr. Reza on November 2, 2024.  Postoperative diagnosis was left index PIP joint traumatic arthrotomy with open fracture.  The following  procedures were done:  Procedure:   Left index finger incision and debridement to the level of bone, dorsally, 2cm2.  Left index finger PIP joint irrigation.  Left index finger incision and debridement to the level of the flexor tendon sheath, 3 cm2.  Left index finger A1 pulley release.      -- Patient is doing well postoperatively.  Plan to continue antibiotic, wound care, postoperative care.  ID consulted , plan per ortho and ID       IDDM type I  Chronic pancreatitis s/p pancreatectomy and pancreatic islet cell transplant:   -She had previous pancreatectomy, distal gastrectomy, splenectomy, gastrojejunostomy, limited bowel resection, and jejunojejunostomy for chronic pancreatitis.  She uses an insulin pump Omnipod 5 at home that is currently in place.  She is alert and oriented and capable of managing her own pump while here.  She is very insulin sensitive and does have a tendency to have hypoglycemia per her own account.  She states her pump does make a lot of adjustments for hypo-/hyperglycemia.  -Continue home insulin pump, but monitor for hypoglycemia, will check POC glucose every 4 hours while NPO and she will notify us if her continuous glucose monitor is reading hypoglycemia or significant hyperglycemia  -Hypoglycemia protocol  -Resumed Viokase      MDD: Resumed PTA duloxetine, escitalopram, and trazodone at bedtime.     GERD: Used pantoprazole 40 mg twice daily in place of her omeprazole and continue famotidine 40 mg twice daily.     BLU: Resumed CPAP if uses at home.     Hypothyroidism: Resumed PTA levothyroxine.      VTE Prophylaxis: Ambulate every shift  Code Status: Full Code  Diet: Advance Diet as Tolerated: Regular Diet Adult    Gonzalez Catheter: Not present        Anticipated Disposition : Home       Medically Ready for Discharge: Anticipated Tomorrow  : if ok with ID           Interval History (Subjective):        Patient is seen and examined by me today and medical record reviewed.Overnight events  "noted and care discussed with nursing staff.Had pain but responding to oxy. No fever or chills        Physical Exam:        Last Vital Signs:  /45 (BP Location: Right arm)   Pulse 54   Temp 97.5  F (36.4  C) (Temporal)   Resp 16   Ht 1.626 m (5' 4\")   Wt 68.6 kg (151 lb 3.8 oz)   LMP 12/19/2013   SpO2 95%   BMI 25.96 kg/m      I/O last 3 completed shifts:  In: 620 [P.O.:120; I.V.:500]  Out: 250 [Urine:250]    Wt Readings from Last 5 Encounters:   11/01/24 68.6 kg (151 lb 3.8 oz)   10/31/24 61.2 kg (135 lb)   08/22/24 61.5 kg (135 lb 8 oz)   05/02/24 58.1 kg (128 lb)   04/12/24 56.7 kg (125 lb)        Constitutional: Awake, alert, cooperative, no apparent distress     Respiratory: Clear to auscultation bilaterally, no crackles or wheezing   Cardiovascular: Regular rate and rhythm, normal S1 and S2, and no murmur noted   Abdomen: Normal bowel sounds, soft, non-distended, non-tender   Skin: No new rashes, no cyanosis, dry to touch   Neuro: Alert with  no new focal weakness   Extremities: Dressed left hand   Other(s):        All other systems: Negative          Medications:        All current medications were reviewed with changes reflected in problem list.         Data:      All new lab and imaging data was reviewed.      Data reviewed today: I reviewed all new labs and imaging results over the last 24 hours. I personally reviewed       Recent Labs   Lab 11/03/24  0734 11/02/24  0627 11/01/24  0327   WBC 10.3 11.2* 10.4   HGB 10.8* 12.2 12.0   HCT 33.5* 37.6 38.3   MCV 93 92 93    360 349     No results for input(s): \"CULT\" in the last 168 hours.  Recent Labs   Lab 11/03/24  1234 11/03/24  0734 11/03/24  0402 11/02/24  0911 11/02/24  0627 11/01/24  0747 11/01/24  0327   NA  --  140  --   --  140  --  142   POTASSIUM  --  4.1  --   --  4.9  --  3.8   CHLORIDE  --  103  --   --  103  --  105   CO2  --  28  --   --  23  --  24   ANIONGAP  --  9  --   --  14  --  13   * 155* 119*   < > 92   < > " "74   BUN  --  16.2  --   --  17.6  --  22.2   CR  --  0.91  --   --  0.78  --  0.96*   GFRESTIMATED  --  71  --   --  86  --  67   VALARIE  --  9.0  --   --  8.7*  --  8.9    < > = values in this interval not displayed.       Recent Labs   Lab 11/03/24  1234 11/03/24  0734 11/03/24  0402 11/02/24  2213 11/02/24  1711   * 155* 119* 185* 187*       No results for input(s): \"INR\" in the last 168 hours.      No results for input(s): \"TROPONIN\", \"TROPI\", \"TROPR\" in the last 168 hours.    Invalid input(s): \"TROP\", \"TROPONINIES\"    No results found for this or any previous visit (from the past 48 hours).    COVID Status:  COVID-19 PCR Results          6/30/2023    22:38 1/4/2024    01:19 3/3/2024    23:37 3/4/2024    10:16   COVID-19 PCR Results   SARS CoV2 PCR Negative  Negative  Negative  Negative      COVID-19 Antibody Results, Testing for Immunity           No data to display                 Disclaimer: This note consists of symbols derived from keyboarding, dictation and/or voice recognition software. As a result, there may be errors in the script that have gone undetected. Please consider this when interpreting information found in this chart.    "

## 2024-11-03 NOTE — PLAN OF CARE
"Pt alert and oriented x 4. On RA. C/o pain, PRN oxycodone given. Independent in room. Voiding adequately. PIV saline locked. Dressing CDI. Blood sugar monitored.     Problem: Adult Inpatient Plan of Care  Goal: Plan of Care Review  Description: The Plan of Care Review/Shift note should be completed every shift.  The Outcome Evaluation is a brief statement about your assessment that the patient is improving, declining, or no change.  This information will be displayed automatically on your shift  note.  Outcome: Progressing  Flowsheets (Taken 11/3/2024 0729)  Outcome Evaluation: Pt alert and oriented x 4. On RA. C/o pain, PRN oxycodone given. Independent in room. Voiding adequately. PIV saline locked. Dressing CDI. Blood sugar monitored.  Plan of Care Reviewed With: patient  Overall Patient Progress: improving  Goal: Patient-Specific Goal (Individualized)  Description: You can add care plan individualizations to a care plan. Examples of Individualization might be:  \"Parent requests to be called daily at 9am for status\", \"I have a hard time hearing out of my right ear\", or \"Do not touch me to wake me up as it startles  me\".  Outcome: Progressing  Goal: Absence of Hospital-Acquired Illness or Injury  Outcome: Progressing  Intervention: Identify and Manage Fall Risk  Recent Flowsheet Documentation  Taken 11/3/2024 0600 by Leanna Noriega, RN  Safety Promotion/Fall Prevention:   nonskid shoes/slippers when out of bed   safety round/check completed  Taken 11/2/2024 2046 by Leanna Noriega, RN  Safety Promotion/Fall Prevention:   safety round/check completed   room organization consistent   patient and family education   nonskid shoes/slippers when out of bed   clutter free environment maintained  Intervention: Prevent Skin Injury  Recent Flowsheet Documentation  Taken 11/3/2024 0600 by Leanna Noriega, RN  Body Position: position changed independently  Taken 11/2/2024 2046 by Leanna Noriega, " RN  Body Position: position changed independently  Intervention: Prevent Infection  Recent Flowsheet Documentation  Taken 11/2/2024 2046 by Leanna Noriega, RN  Infection Prevention:   rest/sleep promoted   cohorting utilized   environmental surveillance performed   equipment surfaces disinfected   hand hygiene promoted  Goal: Optimal Comfort and Wellbeing  Outcome: Progressing  Intervention: Monitor Pain and Promote Comfort  Recent Flowsheet Documentation  Taken 11/2/2024 2046 by Leanna Noriega, RN  Pain Management Interventions: medication (see MAR)  Goal: Readiness for Transition of Care  Outcome: Progressing     Problem: Skin or Soft Tissue Infection  Goal: Absence of Infection Signs and Symptoms  Outcome: Progressing  Intervention: Minimize and Manage Infection Progression  Recent Flowsheet Documentation  Taken 11/3/2024 0600 by Leanna Noriega, RN  Infection Management: aseptic technique maintained  Taken 11/2/2024 2046 by Leanna Noriega, RN  Infection Prevention:   rest/sleep promoted   cohorting utilized   environmental surveillance performed   equipment surfaces disinfected   hand hygiene promoted  Infection Management: aseptic technique maintained     Problem: Pain Acute  Goal: Optimal Pain Control and Function  Outcome: Progressing  Intervention: Develop Pain Management Plan  Recent Flowsheet Documentation  Taken 11/2/2024 2046 by Leanna Noriega, RN  Pain Management Interventions: medication (see MAR)  Intervention: Prevent or Manage Pain  Recent Flowsheet Documentation  Taken 11/3/2024 0600 by Leanna Noriega, RN  Medication Review/Management: medications reviewed  Taken 11/2/2024 2046 by Leanna Noriega, RN  Medication Review/Management: medications reviewed     Problem: Comorbidity Management  Goal: Maintenance of Asthma Control  Outcome: Progressing  Intervention: Maintain Asthma Symptom Control  Recent Flowsheet Documentation  Taken 11/3/2024 0600 by Jaydon  Leanna Go RN  Medication Review/Management: medications reviewed  Taken 11/2/2024 2046 by Leanna Noriega, RN  Medication Review/Management: medications reviewed  Goal: Maintenance of Behavioral Health Symptom Control  Outcome: Progressing  Intervention: Maintain Behavioral Health Symptom Control  Recent Flowsheet Documentation  Taken 11/3/2024 0600 by Leanna Noriega RN  Medication Review/Management: medications reviewed  Taken 11/2/2024 2046 by Leanna Noriega, RN  Medication Review/Management: medications reviewed  Goal: Maintenance of COPD Symptom Control  Outcome: Progressing  Intervention: Maintain COPD Symptom Control  Recent Flowsheet Documentation  Taken 11/3/2024 0600 by Leanna Noriega, RN  Medication Review/Management: medications reviewed  Taken 11/2/2024 2046 by Leanna Noriega RN  Medication Review/Management: medications reviewed  Goal: Blood Glucose Levels Within Targeted Range  Outcome: Progressing  Intervention: Monitor and Manage Glycemia  Recent Flowsheet Documentation  Taken 11/3/2024 0600 by Leanna Noriega RN  Medication Review/Management: medications reviewed  Taken 11/2/2024 2046 by Leanna Noriega, RN  Medication Review/Management: medications reviewed  Goal: Maintenance of Heart Failure Symptom Control  Outcome: Progressing  Intervention: Maintain Heart Failure Management  Recent Flowsheet Documentation  Taken 11/3/2024 0600 by Leanna Noriega RN  Medication Review/Management: medications reviewed  Taken 11/2/2024 2046 by Leanna Noriega, RN  Medication Review/Management: medications reviewed  Goal: Blood Pressure in Desired Range  Outcome: Progressing  Intervention: Maintain Blood Pressure Management  Recent Flowsheet Documentation  Taken 11/3/2024 0600 by Leanna Noriega, RN  Medication Review/Management: medications reviewed  Taken 11/2/2024 2046 by Leanna Noriega, RN  Medication Review/Management:  medications reviewed  Goal: Maintenance of Osteoarthritis Symptom Control  Outcome: Progressing  Intervention: Maintain Osteoarthritis Symptom Control  Recent Flowsheet Documentation  Taken 11/3/2024 0600 by Leanna Noriega, RN  Activity Management: up ad xenia  Medication Review/Management: medications reviewed  Taken 11/2/2024 2046 by Leanna Noriega, RN  Activity Management: up ad xenia  Medication Review/Management: medications reviewed  Goal: Bariatric Home Regimen Maintained  Outcome: Progressing  Intervention: Maintain and Manage Postbariatric Surgery Care  Recent Flowsheet Documentation  Taken 11/3/2024 0600 by Leanna Noriega, RN  Medication Review/Management: medications reviewed  Taken 11/2/2024 2046 by Leanna Noriega, RN  Medication Review/Management: medications reviewed  Goal: Maintenance of Seizure Control  Outcome: Progressing  Intervention: Maintain Seizure Symptom Control  Recent Flowsheet Documentation  Taken 11/3/2024 0600 by Leanna Noriega RN  Medication Review/Management: medications reviewed  Taken 11/2/2024 2046 by Leanna Noriega, RN  Medication Review/Management: medications reviewed     Problem: Infection  Goal: Absence of Infection Signs and Symptoms  Outcome: Progressing  Intervention: Prevent or Manage Infection  Recent Flowsheet Documentation  Taken 11/3/2024 0600 by Leanna Noriega, RN  Infection Management: aseptic technique maintained  Taken 11/2/2024 2046 by Leanna Noriega, RN  Infection Management: aseptic technique maintained     Problem: Fall Injury Risk  Goal: Absence of Fall and Fall-Related Injury  Outcome: Progressing  Intervention: Identify and Manage Contributors  Recent Flowsheet Documentation  Taken 11/3/2024 0600 by Leanna Noriega, RN  Medication Review/Management: medications reviewed  Taken 11/2/2024 2046 by Leanna Noriega, RN  Medication Review/Management: medications reviewed  Intervention: Promote  Injury-Free Environment  Recent Flowsheet Documentation  Taken 11/3/2024 0600 by Leanna Noriega, RN  Safety Promotion/Fall Prevention:   nonskid shoes/slippers when out of bed   safety round/check completed  Taken 11/2/2024 2046 by Leanna Noriega, RN  Safety Promotion/Fall Prevention:   safety round/check completed   room organization consistent   patient and family education   nonskid shoes/slippers when out of bed   clutter free environment maintained   Goal Outcome Evaluation:      Plan of Care Reviewed With: patient    Overall Patient Progress: improvingOverall Patient Progress: improving    Outcome Evaluation: Pt alert and oriented x 4. On RA. C/o pain, PRN oxycodone given. Independent in room. Voiding adequately. PIV saline locked. Dressing CDI. Blood sugar monitored.

## 2024-11-03 NOTE — PLAN OF CARE
"Pt A&O x4. PO oxycodone and scheduled tylenol given for pain. CMS intact. Dressing CDI-changed by ortho PA. Up independently. Voiding. Tolerating CHO diet. Plan is home once antibiotic plan established.     Problem: Adult Inpatient Plan of Care  Goal: Plan of Care Review  Description: The Plan of Care Review/Shift note should be completed every shift.  The Outcome Evaluation is a brief statement about your assessment that the patient is improving, declining, or no change.  This information will be displayed automatically on your shift  note.  Outcome: Progressing  Flowsheets (Taken 11/3/2024 1720)  Plan of Care Reviewed With: patient  Overall Patient Progress: improving  Goal: Patient-Specific Goal (Individualized)  Description: You can add care plan individualizations to a care plan. Examples of Individualization might be:  \"Parent requests to be called daily at 9am for status\", \"I have a hard time hearing out of my right ear\", or \"Do not touch me to wake me up as it startles  me\".  Outcome: Progressing  Goal: Absence of Hospital-Acquired Illness or Injury  Outcome: Progressing  Intervention: Identify and Manage Fall Risk  Recent Flowsheet Documentation  Taken 11/3/2024 0900 by Marissa Tee RN  Safety Promotion/Fall Prevention:   nonskid shoes/slippers when out of bed   safety round/check completed  Intervention: Prevent Infection  Recent Flowsheet Documentation  Taken 11/3/2024 0900 by Marissa Tee RN  Infection Prevention: rest/sleep promoted  Goal: Optimal Comfort and Wellbeing  Outcome: Progressing  Intervention: Monitor Pain and Promote Comfort  Recent Flowsheet Documentation  Taken 11/3/2024 1417 by Marissa Tee RN  Pain Management Interventions: medication (see MAR)  Taken 11/3/2024 0908 by Marissa Tee RN  Pain Management Interventions: medication (see MAR)  Goal: Readiness for Transition of Care  Outcome: Progressing     Problem: Skin or Soft Tissue Infection  Goal: Absence of Infection " Signs and Symptoms  Outcome: Progressing  Intervention: Minimize and Manage Infection Progression  Recent Flowsheet Documentation  Taken 11/3/2024 0900 by Marissa Tee RN  Infection Prevention: rest/sleep promoted  Infection Management: aseptic technique maintained     Problem: Pain Acute  Goal: Optimal Pain Control and Function  Outcome: Progressing  Intervention: Develop Pain Management Plan  Recent Flowsheet Documentation  Taken 11/3/2024 1417 by Marissa Tee RN  Pain Management Interventions: medication (see MAR)  Taken 11/3/2024 0908 by Marissa Tee RN  Pain Management Interventions: medication (see MAR)  Intervention: Prevent or Manage Pain  Recent Flowsheet Documentation  Taken 11/3/2024 0900 by Marissa Tee RN  Medication Review/Management: medications reviewed     Problem: Comorbidity Management  Goal: Maintenance of Asthma Control  Outcome: Progressing  Intervention: Maintain Asthma Symptom Control  Recent Flowsheet Documentation  Taken 11/3/2024 0900 by Marissa Tee RN  Medication Review/Management: medications reviewed  Goal: Maintenance of Behavioral Health Symptom Control  Outcome: Progressing  Intervention: Maintain Behavioral Health Symptom Control  Recent Flowsheet Documentation  Taken 11/3/2024 0900 by Marissa Tee RN  Medication Review/Management: medications reviewed  Goal: Maintenance of COPD Symptom Control  Outcome: Progressing  Intervention: Maintain COPD Symptom Control  Recent Flowsheet Documentation  Taken 11/3/2024 0900 by Marissa Tee RN  Medication Review/Management: medications reviewed  Goal: Blood Glucose Levels Within Targeted Range  Outcome: Progressing  Intervention: Monitor and Manage Glycemia  Recent Flowsheet Documentation  Taken 11/3/2024 0900 by Marissa Tee RN  Medication Review/Management: medications reviewed  Goal: Maintenance of Heart Failure Symptom Control  Outcome: Progressing  Intervention: Maintain Heart Failure  Management  Recent Flowsheet Documentation  Taken 11/3/2024 0900 by Marissa Tee RN  Medication Review/Management: medications reviewed  Goal: Blood Pressure in Desired Range  Outcome: Progressing  Intervention: Maintain Blood Pressure Management  Recent Flowsheet Documentation  Taken 11/3/2024 0900 by Marissa Tee RN  Medication Review/Management: medications reviewed  Goal: Maintenance of Osteoarthritis Symptom Control  Outcome: Progressing  Intervention: Maintain Osteoarthritis Symptom Control  Recent Flowsheet Documentation  Taken 11/3/2024 0900 by Marissa Tee RN  Medication Review/Management: medications reviewed  Goal: Bariatric Home Regimen Maintained  Outcome: Progressing  Intervention: Maintain and Manage Postbariatric Surgery Care  Recent Flowsheet Documentation  Taken 11/3/2024 0900 by Marissa Tee RN  Medication Review/Management: medications reviewed  Goal: Maintenance of Seizure Control  Outcome: Progressing  Intervention: Maintain Seizure Symptom Control  Recent Flowsheet Documentation  Taken 11/3/2024 0900 by Marissa Tee RN  Medication Review/Management: medications reviewed     Problem: Infection  Goal: Absence of Infection Signs and Symptoms  Outcome: Progressing  Intervention: Prevent or Manage Infection  Recent Flowsheet Documentation  Taken 11/3/2024 0900 by Marissa Tee RN  Infection Management: aseptic technique maintained     Problem: Fall Injury Risk  Goal: Absence of Fall and Fall-Related Injury  Outcome: Progressing  Intervention: Identify and Manage Contributors  Recent Flowsheet Documentation  Taken 11/3/2024 0900 by Marissa Tee RN  Medication Review/Management: medications reviewed  Intervention: Promote Injury-Free Environment  Recent Flowsheet Documentation  Taken 11/3/2024 0900 by Marissa Tee RN  Safety Promotion/Fall Prevention:   nonskid shoes/slippers when out of bed   safety round/check completed   Goal Outcome Evaluation:      Plan of  Care Reviewed With: patient    Overall Patient Progress: improvingOverall Patient Progress: improving

## 2024-11-03 NOTE — CONSULTS
Care Management Initial Consult    General Information  Assessment completed with: Patient,    Type of CM/SW Visit: Initial Assessment    Primary Care Provider verified and updated as needed:     Readmission within the last 30 days:        Reason for Consult: other (see comments) (elevated risk score)  Advance Care Planning:            Communication Assessment  Patient's communication style: spoken language (English or Bilingual)    Hearing Difficulty or Deaf: no   Wear Glasses or Blind: yes    Cognitive  Cognitive/Neuro/Behavioral: WDL  Level of Consciousness: alert  Arousal Level: opens eyes spontaneously                Living Environment:   People in home: spouse     Current living Arrangements: house      Able to return to prior arrangements: yes       Family/Social Support:  Care provided by: self  Provides care for: no one  Marital Status:   Support system:           Description of Support System: Supportive, Involved         Current Resources:   Patient receiving home care services: No        Community Resources: None  Equipment currently used at home: none  Supplies currently used at home:      Employment/Financial:  Employment Status: disabled        Financial Concerns:             Does the patient's insurance plan have a 3 day qualifying hospital stay waiver?  Yes     Which insurance plan 3 day waiver is available? Alternative insurance waiver    Will the waiver be used for post-acute placement? No    Lifestyle & Psychosocial Needs:  Social Drivers of Health     Food Insecurity: Low Risk  (11/1/2024)    Food Insecurity     Within the past 12 months, did you worry that your food would run out before you got money to buy more?: No     Within the past 12 months, did the food you bought just not last and you didn t have money to get more?: No   Depression: Not at risk (8/20/2024)    PHQ-2     PHQ-2 Score: 0   Housing Stability: Low Risk  (11/1/2024)    Housing Stability     Do you have housing? :  Yes     Are you worried about losing your housing?: No   Tobacco Use: Low Risk  (11/2/2024)    Patient History     Smoking Tobacco Use: Never     Smokeless Tobacco Use: Never     Passive Exposure: Not on file   Financial Resource Strain: Low Risk  (11/1/2024)    Financial Resource Strain     Within the past 12 months, have you or your family members you live with been unable to get utilities (heat, electricity) when it was really needed?: No   Alcohol Use: Not At Risk (4/12/2024)    AUDIT-C     Frequency of Alcohol Consumption: Monthly or less     Average Number of Drinks: Patient does not drink     Frequency of Binge Drinking: Never   Transportation Needs: Low Risk  (11/1/2024)    Transportation Needs     Within the past 12 months, has lack of transportation kept you from medical appointments, getting your medicines, non-medical meetings or appointments, work, or from getting things that you need?: No   Physical Activity: Insufficiently Active (4/12/2024)    Exercise Vital Sign     Days of Exercise per Week: 2 days     Minutes of Exercise per Session: 30 min   Interpersonal Safety: Low Risk  (11/1/2024)    Interpersonal Safety     Do you feel physically and emotionally safe where you currently live?: Yes     Within the past 12 months, have you been hit, slapped, kicked or otherwise physically hurt by someone?: No     Within the past 12 months, have you been humiliated or emotionally abused in other ways by your partner or ex-partner?: No   Stress: Stress Concern Present (4/12/2024)    Citizen of the [place] of Occupational Health - Occupational Stress Questionnaire     Feeling of Stress : Rather much   Social Connections: Socially Integrated (4/12/2024)    Social Connection and Isolation Panel [NHANES]     Frequency of Communication with Friends and Family: More than three times a week     Frequency of Social Gatherings with Friends and Family: Twice a week     Attends Scientologist Services: More than 4 times per year      Active Member of Clubs or Organizations: Yes     Attends Club or Organization Meetings: 1 to 4 times per year     Marital Status:    Health Literacy: Not on file       Functional Status:  Prior to admission patient needed assistance:   Dependent ADLs:: Independent  Dependent IADLs:: Independent       Mental Health Status:  Mental Health Status: No Current Concerns       Chemical Dependency Status:  Chemical Dependency Status: No Current Concerns             Values/Beliefs:  Spiritual, Cultural Beliefs, Caodaism Practices, Values that affect care:                 Discussed  Partnership in Safe Discharge Planning  document with patient/family: No    Additional Information:  Consult received for elevated risk score  SW met with pt and her . Pt reports they live in a house. She is independent with all ADL's and IADL's. She drives. Does not use any assistive devices.  She does not have any services. Pt/ don't anticipate having any needs at discharge.      Next Steps: no needs identified. Please send new consult if other needs/concerns arise.     Destinee SHARP, [facility]  Inpatient Care Coordination   [facility]   [phone]

## 2024-11-04 ENCOUNTER — ENROLLMENT (OUTPATIENT)
Dept: HOME HEALTH SERVICES | Facility: HOME HEALTH | Age: 59
End: 2024-11-04
Payer: COMMERCIAL

## 2024-11-04 DIAGNOSIS — E13.9 POST-PANCREATECTOMY DIABETES (H): ICD-10-CM

## 2024-11-04 DIAGNOSIS — Z90.410 POST-PANCREATECTOMY DIABETES (H): ICD-10-CM

## 2024-11-04 DIAGNOSIS — E89.1 POST-PANCREATECTOMY DIABETES (H): ICD-10-CM

## 2024-11-04 LAB
ERYTHROCYTE [DISTWIDTH] IN BLOOD BY AUTOMATED COUNT: 14.5 % (ref 10–15)
GLUCOSE BLDC GLUCOMTR-MCNC: 118 MG/DL (ref 70–99)
GLUCOSE SERPL-MCNC: 116 MG/DL (ref 70–99)
HCT VFR BLD AUTO: 34 % (ref 35–47)
HGB BLD-MCNC: 10.7 G/DL (ref 11.7–15.7)
MCH RBC QN AUTO: 29.9 PG (ref 26.5–33)
MCHC RBC AUTO-ENTMCNC: 31.5 G/DL (ref 31.5–36.5)
MCV RBC AUTO: 95 FL (ref 78–100)
PLATELET # BLD AUTO: 355 10E3/UL (ref 150–450)
RBC # BLD AUTO: 3.58 10E6/UL (ref 3.8–5.2)
WBC # BLD AUTO: 7.6 10E3/UL (ref 4–11)

## 2024-11-04 PROCEDURE — 120N000001 HC R&B MED SURG/OB

## 2024-11-04 PROCEDURE — 250N000011 HC RX IP 250 OP 636: Performed by: INTERNAL MEDICINE

## 2024-11-04 PROCEDURE — 250N000013 HC RX MED GY IP 250 OP 250 PS 637: Performed by: INTERNAL MEDICINE

## 2024-11-04 PROCEDURE — 82947 ASSAY GLUCOSE BLOOD QUANT: CPT | Performed by: INTERNAL MEDICINE

## 2024-11-04 PROCEDURE — 99232 SBSQ HOSP IP/OBS MODERATE 35: CPT | Performed by: INTERNAL MEDICINE

## 2024-11-04 PROCEDURE — 85014 HEMATOCRIT: CPT | Performed by: INTERNAL MEDICINE

## 2024-11-04 PROCEDURE — 36415 COLL VENOUS BLD VENIPUNCTURE: CPT | Performed by: INTERNAL MEDICINE

## 2024-11-04 PROCEDURE — 250N000011 HC RX IP 250 OP 636: Performed by: STUDENT IN AN ORGANIZED HEALTH CARE EDUCATION/TRAINING PROGRAM

## 2024-11-04 PROCEDURE — 250N000013 HC RX MED GY IP 250 OP 250 PS 637: Performed by: STUDENT IN AN ORGANIZED HEALTH CARE EDUCATION/TRAINING PROGRAM

## 2024-11-04 RX ORDER — OXYCODONE HYDROCHLORIDE 5 MG/1
2.5-5 TABLET ORAL EVERY 4 HOURS PRN
Qty: 8 TABLET | Refills: 0 | Status: SHIPPED | OUTPATIENT
Start: 2024-11-04

## 2024-11-04 RX ORDER — ERTAPENEM 1 G/1
1 INJECTION, POWDER, LYOPHILIZED, FOR SOLUTION INTRAMUSCULAR; INTRAVENOUS EVERY 24 HOURS
Qty: 140 ML | Refills: 0 | Status: SHIPPED | OUTPATIENT
Start: 2024-11-04 | End: 2024-11-05

## 2024-11-04 RX ORDER — ERTAPENEM 1 G/1
1 INJECTION, POWDER, LYOPHILIZED, FOR SOLUTION INTRAMUSCULAR; INTRAVENOUS EVERY 24 HOURS
Status: DISCONTINUED | OUTPATIENT
Start: 2024-11-04 | End: 2024-11-05 | Stop reason: HOSPADM

## 2024-11-04 RX ORDER — ACETAMINOPHEN 500 MG
1000 TABLET ORAL EVERY 8 HOURS PRN
Qty: 60 TABLET | Refills: 0 | Status: SHIPPED | OUTPATIENT
Start: 2024-11-04

## 2024-11-04 RX ORDER — POLYETHYLENE GLYCOL 3350 17 G/17G
17 POWDER, FOR SOLUTION ORAL DAILY
Qty: 510 G | Refills: 0 | Status: SHIPPED | OUTPATIENT
Start: 2024-11-04

## 2024-11-04 RX ADMIN — OXYCODONE HYDROCHLORIDE 10 MG: 10 TABLET ORAL at 21:38

## 2024-11-04 RX ADMIN — OXYCODONE HYDROCHLORIDE 10 MG: 10 TABLET ORAL at 01:27

## 2024-11-04 RX ADMIN — ACETAMINOPHEN 650 MG: 325 TABLET, FILM COATED ORAL at 01:26

## 2024-11-04 RX ADMIN — DULOXETINE HYDROCHLORIDE 20 MG: 20 CAPSULE, DELAYED RELEASE ORAL at 09:08

## 2024-11-04 RX ADMIN — AMPICILLIN SODIUM AND SULBACTAM SODIUM 3 G: 2; 1 INJECTION, POWDER, FOR SOLUTION INTRAMUSCULAR; INTRAVENOUS at 09:08

## 2024-11-04 RX ADMIN — ERTAPENEM SODIUM 1 G: 1 INJECTION, POWDER, LYOPHILIZED, FOR SOLUTION INTRAMUSCULAR; INTRAVENOUS at 12:17

## 2024-11-04 RX ADMIN — LEVOTHYROXINE SODIUM 125 MCG: 0.03 TABLET ORAL at 09:08

## 2024-11-04 RX ADMIN — ACETAMINOPHEN 1000 MG: 500 TABLET, FILM COATED ORAL at 12:40

## 2024-11-04 RX ADMIN — POTASSIUM CITRATE 10 MEQ: 10 TABLET, EXTENDED RELEASE ORAL at 09:23

## 2024-11-04 RX ADMIN — PANTOPRAZOLE SODIUM 40 MG: 40 TABLET, DELAYED RELEASE ORAL at 17:17

## 2024-11-04 RX ADMIN — PANTOPRAZOLE SODIUM 40 MG: 40 TABLET, DELAYED RELEASE ORAL at 09:08

## 2024-11-04 RX ADMIN — SENNOSIDES AND DOCUSATE SODIUM 1 TABLET: 8.6; 5 TABLET ORAL at 09:08

## 2024-11-04 RX ADMIN — FAMOTIDINE 40 MG: 20 TABLET ORAL at 21:38

## 2024-11-04 RX ADMIN — PANCRELIPASE 3 CAPSULE: 36000; 180000; 114000 CAPSULE, DELAYED RELEASE PELLETS ORAL at 17:16

## 2024-11-04 RX ADMIN — PANCRELIPASE 3 CAPSULE: 36000; 180000; 114000 CAPSULE, DELAYED RELEASE PELLETS ORAL at 12:17

## 2024-11-04 RX ADMIN — GABAPENTIN 300 MG: 300 CAPSULE ORAL at 21:38

## 2024-11-04 RX ADMIN — POLYETHYLENE GLYCOL 3350 17 G: 17 POWDER, FOR SOLUTION ORAL at 09:24

## 2024-11-04 RX ADMIN — PANCRELIPASE 3 CAPSULE: 36000; 180000; 114000 CAPSULE, DELAYED RELEASE PELLETS ORAL at 09:08

## 2024-11-04 RX ADMIN — GABAPENTIN 300 MG: 300 CAPSULE ORAL at 09:08

## 2024-11-04 RX ADMIN — OXYCODONE HYDROCHLORIDE 10 MG: 10 TABLET ORAL at 09:23

## 2024-11-04 RX ADMIN — SENNOSIDES AND DOCUSATE SODIUM 1 TABLET: 8.6; 5 TABLET ORAL at 21:38

## 2024-11-04 RX ADMIN — ACETAMINOPHEN 1000 MG: 500 TABLET, FILM COATED ORAL at 21:38

## 2024-11-04 RX ADMIN — ESCITALOPRAM OXALATE 20 MG: 5 TABLET, FILM COATED ORAL at 09:09

## 2024-11-04 RX ADMIN — OXYCODONE HYDROCHLORIDE 10 MG: 10 TABLET ORAL at 14:47

## 2024-11-04 RX ADMIN — ACETAMINOPHEN 1000 MG: 500 TABLET, FILM COATED ORAL at 03:47

## 2024-11-04 RX ADMIN — FAMOTIDINE 40 MG: 20 TABLET ORAL at 09:08

## 2024-11-04 RX ADMIN — ONDANSETRON 4 MG: 2 INJECTION INTRAMUSCULAR; INTRAVENOUS at 21:39

## 2024-11-04 RX ADMIN — AMPICILLIN SODIUM AND SULBACTAM SODIUM 3 G: 2; 1 INJECTION, POWDER, FOR SOLUTION INTRAMUSCULAR; INTRAVENOUS at 03:46

## 2024-11-04 NOTE — CONSULTS
Care Management Follow Up    Length of Stay (days): 3    Expected Discharge Date: 11/04/2024     Concerns to be Addressed:     IV infusion  Patient plan of care discussed at interdisciplinary rounds: Yes    Anticipated Discharge Disposition: Home w/ home infusion    Patient/family educated on Medicare website which has current facility and service quality ratings:  yes  Education Provided on the Discharge Plan:  yes  Patient/Family in Agreement with the Plan:  yes    Referrals Placed by CM/SW:  home infusion  Private pay costs discussed:  N/a    Discussed  Partnership in Safe Discharge Planning  document with patient/family: Yes     Handoff Completed: No, handoff not indicated or clinically appropriate    Additional Information:  New consult today to set patient up with outpatient infusions for IV abx. Met with patient at bedside to discuss. Patient would prefer to do home infusion herself and safe herself the drive to outpatient infusion center. Referral sent to [facility] for benefit check. Will follow up with patient when we know benefits and she will decide. Hospitalist updated on pt's preference.       Next Steps: Follow up with [facility] and patient when benefits are known.        Sharyn Jenkins RN  Care Coordinator  [facility]  [phone]

## 2024-11-04 NOTE — PLAN OF CARE
"Goal Outcome Evaluation:      Plan of Care Reviewed With: patient    Overall Patient Progress: improvingOverall Patient Progress: improving    Outcome Evaluation: Patient slept between cares. Pain well controlled. Left index dressing c/d/I. ROM encouraged. Afebrile. Vitally stable.    /46 (BP Location: Right arm, Patient Position: Left side, Cuff Size: Adult Regular)   Pulse 59   Temp 98  F (36.7  C) (Temporal)   Resp 16   Ht 1.626 m (5' 4\")   Wt 68.6 kg (151 lb 3.8 oz)   LMP 12/19/2013   SpO2 91%   BMI 25.96 kg/m      Problem: Adult Inpatient Plan of Care  Goal: Plan of Care Review  Description: The Plan of Care Review/Shift note should be completed every shift.  The Outcome Evaluation is a brief statement about your assessment that the patient is improving, declining, or no change.  This information will be displayed automatically on your shift  note.  Outcome: Progressing  Flowsheets (Taken 11/4/2024 0610)  Outcome Evaluation: Patient slept between cares. Pain well controlled.  Plan of Care Reviewed With: patient  Overall Patient Progress: improving  Goal: Patient-Specific Goal (Individualized)  Description: You can add care plan individualizations to a care plan. Examples of Individualization might be:  \"Parent requests to be called daily at 9am for status\", \"I have a hard time hearing out of my right ear\", or \"Do not touch me to wake me up as it startles  me\".  Outcome: Progressing  Goal: Absence of Hospital-Acquired Illness or Injury  Outcome: Progressing  Intervention: Identify and Manage Fall Risk  Recent Flowsheet Documentation  Taken 11/3/2024 2100 by Coco Baxter RN  Safety Promotion/Fall Prevention:   nonskid shoes/slippers when out of bed   safety round/check completed  Intervention: Prevent Skin Injury  Recent Flowsheet Documentation  Taken 11/3/2024 2100 by Coco Baxter, RN  Body Position: position changed independently  Intervention: Prevent Infection  Recent Flowsheet " Documentation  Taken 11/3/2024 2100 by Coco Baxter RN  Infection Prevention: rest/sleep promoted  Goal: Optimal Comfort and Wellbeing  Outcome: Progressing  Intervention: Monitor Pain and Promote Comfort  Recent Flowsheet Documentation  Taken 11/3/2024 2100 by Coco Baxter RN  Pain Management Interventions: medication (see MAR)  Goal: Readiness for Transition of Care  Outcome: Progressing     Problem: Skin or Soft Tissue Infection  Goal: Absence of Infection Signs and Symptoms  Outcome: Progressing  Intervention: Minimize and Manage Infection Progression  Recent Flowsheet Documentation  Taken 11/3/2024 2100 by Coco Baxter RN  Infection Prevention: rest/sleep promoted  Infection Management: aseptic technique maintained     Problem: Pain Acute  Goal: Optimal Pain Control and Function  Outcome: Progressing  Intervention: Develop Pain Management Plan  Recent Flowsheet Documentation  Taken 11/3/2024 2100 by Coco Baxter RN  Pain Management Interventions: medication (see MAR)  Intervention: Prevent or Manage Pain  Recent Flowsheet Documentation  Taken 11/3/2024 2100 by Coco Baxter RN  Medication Review/Management: medications reviewed     Problem: Comorbidity Management  Goal: Maintenance of Asthma Control  Outcome: Progressing  Intervention: Maintain Asthma Symptom Control  Recent Flowsheet Documentation  Taken 11/3/2024 2100 by Coco Baxter RN  Medication Review/Management: medications reviewed  Goal: Maintenance of Behavioral Health Symptom Control  Outcome: Progressing  Intervention: Maintain Behavioral Health Symptom Control  Recent Flowsheet Documentation  Taken 11/3/2024 2100 by Coco Baxter RN  Medication Review/Management: medications reviewed  Goal: Maintenance of COPD Symptom Control  Outcome: Progressing  Intervention: Maintain COPD Symptom Control  Recent Flowsheet Documentation  Taken 11/3/2024 2100 by Coco Baxter RN  Medication Review/Management: medications reviewed  Goal: Blood  Glucose Levels Within Targeted Range  Outcome: Progressing  Intervention: Monitor and Manage Glycemia  Recent Flowsheet Documentation  Taken 11/3/2024 2100 by Coco Baxter RN  Medication Review/Management: medications reviewed  Goal: Maintenance of Heart Failure Symptom Control  Outcome: Progressing  Intervention: Maintain Heart Failure Management  Recent Flowsheet Documentation  Taken 11/3/2024 2100 by Coco Baxter RN  Medication Review/Management: medications reviewed  Goal: Blood Pressure in Desired Range  Outcome: Progressing  Intervention: Maintain Blood Pressure Management  Recent Flowsheet Documentation  Taken 11/3/2024 2100 by Coco Baxter RN  Medication Review/Management: medications reviewed  Goal: Maintenance of Osteoarthritis Symptom Control  Outcome: Progressing  Intervention: Maintain Osteoarthritis Symptom Control  Recent Flowsheet Documentation  Taken 11/3/2024 2100 by Coco Baxter RN  Activity Management: activity adjusted per tolerance  Medication Review/Management: medications reviewed  Goal: Bariatric Home Regimen Maintained  Outcome: Progressing  Intervention: Maintain and Manage Postbariatric Surgery Care  Recent Flowsheet Documentation  Taken 11/3/2024 2100 by Coco Baxter RN  Medication Review/Management: medications reviewed  Goal: Maintenance of Seizure Control  Outcome: Progressing  Intervention: Maintain Seizure Symptom Control  Recent Flowsheet Documentation  Taken 11/3/2024 2100 by Coco Baxter RN  Medication Review/Management: medications reviewed     Problem: Infection  Goal: Absence of Infection Signs and Symptoms  Outcome: Progressing  Intervention: Prevent or Manage Infection  Recent Flowsheet Documentation  Taken 11/3/2024 2100 by Coco Baxter RN  Infection Management: aseptic technique maintained     Problem: Fall Injury Risk  Goal: Absence of Fall and Fall-Related Injury  Outcome: Progressing  Intervention: Identify and Manage Contributors  Recent Flowsheet  Documentation  Taken 11/3/2024 2100 by Coco Baxter, RN  Medication Review/Management: medications reviewed  Intervention: Promote Injury-Free Environment  Recent Flowsheet Documentation  Taken 11/3/2024 2100 by Coco Baxter, RN  Safety Promotion/Fall Prevention:   nonskid shoes/slippers when out of bed   safety round/check completed

## 2024-11-04 NOTE — PROGRESS NOTES
Gradually feeling better, no major new complaints.   Exam stable.  Await follow-up ID visit for final antibiotic recommendations.  If ongoing IV recommended will need to get SW involved and work on location plan for the daily abx.  Full note to follow.

## 2024-11-04 NOTE — PROGRESS NOTES
[facility]    Medicine Progress Note - Hospitalist Service    Date of Admission:  11/1/2024    Assessment & Plan   Lynn Thompson is a 59 year old female with PMH including chronic pancreatitis now s/p pancreatectomy with pancreatic islet cell transplant now on insulin pump for DM1, distal gastrectomy, splenectomy, hypothyroidism, GERD, BLU, and nephrolithiasis who presents with finger pain and swelling after a dog bite.     Dog bite  Left second finger cellulitis  left index PIP joint traumatic arthrotomy with open fracture  Left second PIP tenosynovitis:  Summary:  She tried to separate her cat and dog who were fighting this morning around 8 AM when her dog bit her on her left second finger and there is a puncture wound over the posterior PIP and the anterior soft tissue.  She received IV Unasyn and was sent home on Augmentin, then had significant increase swelling and pain at the PIP now spreading up into the hand consistent with cellulitis and concern for possible tenosynovitis as well.  She cannot flex the PIP joint without severe pain.  X-ray earlier did show a 3 mm radiodensity which could be a bone fragment or foreign body which I would presume would be a dog tooth.  She is updated on tetanus and the dog is up-to-date on vaccinations.  She has had a hand infection from a dog bite in the past requiring surgery some years ago.      -  Patient was admitted and was treated with Unasyn IV and orthopedic surgery consulted.  Patient had incision and debridement by Dr. Reza on November 2, 2024.  Postoperative diagnosis was left index PIP joint traumatic arthrotomy with open fracture.  The following procedures were done:  Procedure:   Left index finger incision and debridement to the level of bone, dorsally, 2cm2.  Left index finger PIP joint irrigation.  Left index finger incision and debridement to the level of the flexor tendon sheath, 3 cm2.  Left index finger A1 pulley release.       "  -  Patient is doing well postoperatively.  Plan to continue IV unasyn today, wound care, postoperative care.  ID consulted, tentative plan for course of IV abx with vascular access consult/SW consult.  Possible d/c 11/5 if abx plan in place and she continues to improve.        IDDM type I  Chronic pancreatitis s/p pancreatectomy and pancreatic islet cell transplant:   -She had previous pancreatectomy, distal gastrectomy, splenectomy, gastrojejunostomy, limited bowel resection, and jejunojejunostomy for chronic pancreatitis.  She uses an insulin pump Omnipod 5 at home that is currently in place.  She is alert and oriented and capable of managing her own pump while here.  She is very insulin sensitive and does have a tendency to have hypoglycemia per her own account.  She states her pump does make a lot of adjustments for hypo-/hyperglycemia.  -Continue home insulin pump, but monitor for hypoglycemia,  she will notify us if her continuous glucose monitor is reading hypoglycemia or significant hyperglycemia.     -Viokase  Recent Labs   Lab 11/04/24  0655 11/04/24  0300 11/03/24  2219 11/03/24  1733 11/03/24  1234   * 118* 242* 160* 100*      MDD:  -  PTA duloxetine, escitalopram, and trazodone at bedtime.     GERD:  -  Used pantoprazole 40 mg twice daily in place of her omeprazole and continue famotidine 40 mg twice daily.     BLU:  -  CPAP     Hypothyroidism:  -  PTA levothyroxine    PPE Used:  Mask          Diet: Advance Diet as Tolerated: Regular Diet Adult    DVT Prophylaxis: Pneumatic Compression Devices and Ambulate every shift  Gonzalez Catheter: Not present  Lines: None     Cardiac Monitoring: None  Code Status: Full Code      Clinically Significant Risk Factors                              # Overweight: Estimated body mass index is 25.96 kg/m  as calculated from the following:    Height as of this encounter: 1.626 m (5' 4\").    Weight as of this encounter: 68.6 kg (151 lb 3.8 oz)., PRESENT ON ADMISSION   "          Disposition Plan     Medically Ready for Discharge: Anticipated Tomorrow     Sathish Alexandra, DO  Hospitalist Service  [facility]  Securely message with [employer] (more info)  Text page via [employer] Paging/Directory   ______________________________________________________________________    Interval History   Assumed hospitalist team care, history reviewed.  Ms. Thompson is steadily feeling better.  She thinks swelling and hand discomfort gradually improving each day.  No other new complaints.    Physical Exam   Vital Signs: Temp: 97.6  F (36.4  C) Temp src: Temporal BP: 119/69 Pulse: 58   Resp: 16 SpO2: 94 % O2 Device: None (Room air)    Weight: 151 lbs 3.77 oz    GEN:  Alert, oriented x 3, appears comfortable, no overt distress.  HEENT:  Normocephalic/atraumatic, no scleral icterus, no nasal discharge, mouth moist.  CV:  Regular rate and rhythm, no murmur or JVD.  S1 + S2 noted, no S3 or S4.  LUNGS:  Clear to auscultation bilaterally without rales/rhonchi/wheezing/retractions.  Symmetric chest rise on inhalation noted.  ABD:  Active bowel sounds, soft, non-tender/non-distended.  No guarding/rigidity.  EXT:  Left hand dressed.  Detailed surgical site exam deferred to surgical team.  No worsening edema other extremities.  SKIN:  Dry to touch, no new exanthems noted in the visualized areas.    Medical Decision Making       45 MINUTES SPENT BY ME on the date of service doing chart review, history, exam, documentation & further activities per the note.      Data   Medications   Current Facility-Administered Medications   Medication Dose Route Frequency Provider Last Rate Last Admin    insulin basal rate from AMBULATORY PUMP   Subcutaneous Continuous Andrew Fowler MD   Rate Verify at 11/01/24 1101     Current Facility-Administered Medications   Medication Dose Route Frequency Provider Last Rate Last Admin    acetaminophen (TYLENOL) tablet 1,000 mg  1,000 mg Oral Q8H Andrew Fowler MD    1,000 mg at 11/04/24 1240    DULoxetine (CYMBALTA) DR capsule 20 mg  20 mg Oral Daily Nnamdi Ahumada MD   20 mg at 11/04/24 0908    ertapenem (INVanz) 1 g vial to attach to  mL bag  1 g Intravenous Q24H Igor Marrero MD   1 g at 11/04/24 1217    escitalopram (LEXAPRO) tablet 20 mg  20 mg Oral Daily Nnamdi Ahumada MD   20 mg at 11/04/24 0909    famotidine (PEPCID) tablet 40 mg  40 mg Oral BID Nnamdi Ahumada MD   40 mg at 11/04/24 0908    gabapentin (NEURONTIN) capsule 300 mg  300 mg Oral BID Nnamdi Ahumada MD   300 mg at 11/04/24 0908    insulin aspart (NovoLOG/FIASP) 100 UNIT/ML VIAL FOR FILLING PUMP RESERVOIR   Device See Admin Instructions Nnamdi Ahumada MD        insulin bolus from AMBULATORY PUMP   Subcutaneous 4x Daily AC & HS Andrew Fowler MD   4.8 Units at 11/04/24 1226    insulin bolus from AMBULATORY PUMP   Subcutaneous TID AC Nnamdi Ahumada MD   4.15 Units at 11/03/24 1822    levothyroxine (SYNTHROID/LEVOTHROID) tablet 125 mcg  125 mcg Oral QAM AC Nnamdi Ahumada MD   125 mcg at 11/04/24 0908    lipase-protease-amylase (CREON 36) 10583-219101-420878 units per EC Capsule 3 capsule  3 capsule Oral TID w/meals Nnamdi Ahumada MD   3 capsule at 11/04/24 1217    pantoprazole (PROTONIX) EC tablet 40 mg  40 mg Oral BID AC Nnamdi Ahumada MD   40 mg at 11/04/24 0908    polyethylene glycol (MIRALAX) Packet 17 g  17 g Oral Daily Mami Johnson PA-C   17 g at 11/04/24 0924    potassium citrate (UROCIT-K) CR tablet 10 mEq  10 mEq Oral Daily Nnamdi Ahumada MD   10 mEq at 11/04/24 0923    senna-docusate (SENOKOT-S/PERICOLACE) 8.6-50 MG per tablet 1 tablet  1 tablet Oral BID Mami Johnson PA-C   1 tablet at 11/04/24 0908    sodium chloride (PF) 0.9% PF flush 3 mL  3 mL Intracatheter Q8H Nnamdi Ahumada MD   3 mL at 11/04/24 5877     Labs and Imaging results below reviewed today.  Recent Labs   Lab 11/04/24  0667  11/03/24  0734 11/02/24  0627   WBC 7.6 10.3 11.2*   HGB 10.7* 10.8* 12.2   HCT 34.0* 33.5* 37.6   MCV 95 93 92    332 360     7-Day Micro Results       Collected Updated Procedure Result Status      11/02/2024 0834 11/04/2024 0946 Anaerobic Bacterial Culture Routine [[id number]]    Tissue from Finger, Left    Preliminary result Component Value   Culture No anaerobic organisms isolated after 1 day  [P]                11/02/2024 0834 11/02/2024 2229 Gram Stain [[id number]]   Tissue from Finger, Left    Final result Component Value   GS Culture See corresponding culture for results   Gram Stain Result No organisms seen   Gram Stain Result 1+ WBC seen            11/02/2024 0834 11/04/2024 1223 Tissue Aerobic Bacterial Culture Routine [[id number]]    Tissue from Finger, Left    Preliminary result Component Value   Culture Culture in progress  [P]                11/01/2024 0327 11/04/2024 1016 Blood Culture Peripheral Blood [[id number]]   Peripheral Blood    Preliminary result Component Value   Culture No growth after 3 days  [P]                      Recent Labs   Lab 11/04/24  0655 11/04/24  0300 11/03/24  2219 11/03/24  1234 11/03/24  0734 11/02/24  0911 11/02/24  0627 11/01/24  0747 11/01/24  0327   NA  --   --   --   --  140  --  140  --  142   POTASSIUM  --   --   --   --  4.1  --  4.9  --  3.8   CHLORIDE  --   --   --   --  103  --  103  --  105   CO2  --   --   --   --  28  --  23  --  24   ANIONGAP  --   --   --   --  9  --  14  --  13   * 118* 242*   < > 155*   < > 92   < > 74   BUN  --   --   --   --  16.2  --  17.6  --  22.2   CR  --   --   --   --  0.91  --  0.78  --  0.96*   GFRESTIMATED  --   --   --   --  71  --  86  --  67   VALARIE  --   --   --   --  9.0  --  8.7*  --  8.9    < > = values in this interval not displayed.     No results found for this or any previous visit (from the past 24 hours).

## 2024-11-04 NOTE — PROGRESS NOTES
"[facility] [place]  INFECTIOUS DISEASES PROGRESS  Lynn Thompson 11/3/2024    Today 11/3/2024  Feeling better  Moving the hand and fingers better  No fevers        History 11/2/24   58 yo woman with chronic pancreatitis,s/p pancreatectomy and pancreatic islet cell transplantation, on insulin pump, DM, splenectomy and distal gastrectomy, GDED, BLU, kidney stones  Sustained a dog bite to her finger trying to separate cat and dog on 10/31/24.  Bite to L 2nd finger of PIP,   She was seen in clinic initially and given iv unasyn x 1 and oral augmentin on discharge.   Xray showed possible bone fragment vs fb  Admitted to hospital on 11/1 with worsening status of the finger.   Up to date on tetanus. Dog has had vaccinations  Her WBC was wnl  Started on iv Unasyn  She went to surgery today      Physical Examination  /85 (BP Location: Right arm)   Pulse 59   Temp 97.7  F (36.5  C) (Temporal)   Resp 16   Ht 1.626 m (5' 4\")   Wt 68.6 kg (151 lb 3.8 oz)   LMP 12/19/2013   SpO2 95%   BMI 25.96 kg/m    HEENT:, scleral anicteric , no scleral hemorrhages,   Op clear  Neck supple, no GABRIEL  CV: RRR  Lungs CTA bilat  Abd soft, NT, ND  Ext; no c/c/e, no splinter hemorrhages or nodes   L hand with mild erythema, incision c/d/i    Current Facility-Administered Medications   Medication Dose Route Frequency Provider Last Rate Last Admin    acetaminophen (TYLENOL) tablet 650 mg  650 mg Oral Q4H PRN Nnamdi Ahumada MD   650 mg at 11/01/24 2237    Or    acetaminophen (TYLENOL) Suppository 650 mg  650 mg Rectal Q4H PRN Nnamdi Ahumada MD        acetaminophen (TYLENOL) tablet 1,000 mg  1,000 mg Oral Q8H Andrew Fowler MD   1,000 mg at 11/03/24 1306    [START ON 11/5/2024] acetaminophen (TYLENOL) tablet 650 mg  650 mg Oral Q4H PRN Mami Johnson, PAJobC        ampicillin-sulbactam (UNASYN) 3 g vial to attach to  mL bag  3 g Intravenous Q6H Andrew Fowler MD   3 g at 11/03/24 1608    " [START ON 11/5/2024] bisacodyl (DULCOLAX) suppository 10 mg  10 mg Rectal Daily PRN Mami Johnson PA-C        BUPivacaine (MARCAINE) 0.25 % injection    PRN Aileen Reza MD   10 mL at 11/02/24 0906    calcium carbonate (TUMS) chewable tablet 1,000 mg  1,000 mg Oral 4x Daily PRN Nnamdi Ahumada MD        ceFAZolin (ANCEF) 2 g in 100 mL D5W intermittent infusion  2 g Intravenous Pre-Op/Pre-procedure x 1 dose Mami Johnson PA-C        ceFAZolin (ANCEF) 2 g in 100 mL D5W intermittent infusion  2 g Intravenous See Admin Instructions Mami Johnson PA-C        glucose gel 15-30 g  15-30 g Oral Q15 Min PRN Nnamdi Ahumada MD        Or    dextrose 50 % injection 25-50 mL  25-50 mL Intravenous Q15 Min PRN Nnamdi Ahumada MD        Or    glucagon injection 1 mg  1 mg Subcutaneous Q15 Min PRN Nnamdi Ahumada MD        DULoxetine (CYMBALTA) DR capsule 20 mg  20 mg Oral Daily Nnamdi Ahumada MD   20 mg at 11/03/24 0908    escitalopram (LEXAPRO) tablet 20 mg  20 mg Oral Daily Nnamdi Ahumada MD   20 mg at 11/03/24 0907    famotidine (PEPCID) tablet 40 mg  40 mg Oral BID Nnamdi Ahumada MD   40 mg at 11/03/24 0907    gabapentin (NEURONTIN) capsule 300 mg  300 mg Oral BID Nnamdi Ahumada MD   300 mg at 11/03/24 0908    HYDROmorphone (DILAUDID) injection 0.2 mg  0.2 mg Intravenous Q2H PRN Nnamdi Ahumada MD   0.2 mg at 11/01/24 1307    HYDROmorphone (PF) (DILAUDID) injection 0.5 mg  0.5 mg Intravenous Q2H PRN Andrew Fowler MD        hydrOXYzine HCl (ATARAX) tablet 25 mg  25 mg Oral Daily PRN Nnamdi Ahumada MD   25 mg at 11/01/24 0539    insulin aspart (NovoLOG/FIASP) 100 UNIT/ML VIAL FOR FILLING PUMP RESERVOIR   Device See Admin Instructions Nnamdi Ahumada MD        insulin basal rate from AMBULATORY PUMP   Subcutaneous Continuous Andrew Fowler MD   Rate Verify at 11/01/24 1101    insulin bolus from AMBULATORY PUMP    Subcutaneous 4x Daily AC & HS Andrew Fowler MD   5.4 Units at 11/03/24 1308    insulin bolus from AMBULATORY PUMP   Subcutaneous TID Nnamdi Arenas MD   5.4 Units at 11/03/24 1308    levothyroxine (SYNTHROID/LEVOTHROID) tablet 125 mcg  125 mcg Oral QAM AC Nnamdi Ahumada MD   125 mcg at 11/03/24 0907    lidocaine (LMX4) cream   Topical Q1H PRN Mami Johnson PA-C        lidocaine (LMX4) cream   Topical Q1H PRN Nnamdi Ahumada MD        lidocaine 1 % 0.1-1 mL  0.1-1 mL Other Q1H PRN Mami Johnson PA-C        lidocaine 1 % 0.1-1 mL  0.1-1 mL Other Q1H PRN Nnamdi Ahumada MD        lipase-protease-amylase (CREON 36) [id number] units per EC Capsule 2 capsule  2 capsule Oral With Snacks or Supplements Nnamdi Ahumada MD   2 capsule at 11/01/24 1425    lipase-protease-amylase (CREON 36) [id number] units per EC Capsule 3 capsule  3 capsule Oral TID w/meals Nnamdi Ahumada MD   3 capsule at 11/03/24 1739    loratadine (CLARITIN) tablet 10 mg  10 mg Oral Daily PRN Nnamdi Ahumada MD        [START ON 11/4/2024] magnesium hydroxide (MILK OF MAGNESIA) suspension 30 mL  30 mL Oral Daily PRN Mami Johnson PA-C        melatonin tablet 5 mg  5 mg Oral At Bedtime PRN Nnamdi Ahumada MD        naloxone (NARCAN) injection 0.2 mg  0.2 mg Intravenous Q2 Min PRN Nnamdi Ahumada MD        Or    naloxone (NARCAN) injection 0.4 mg  0.4 mg Intravenous Q2 Min PRN Nnamdi Ahumada MD        Or    naloxone (NARCAN) injection 0.2 mg  0.2 mg Intramuscular Q2 Min PRN Nnamdi Ahumada MD        Or    naloxone (NARCAN) injection 0.4 mg  0.4 mg Intramuscular Q2 Min PRN Nnamdi Ahumada MD        ondansetron (ZOFRAN ODT) ODT tab 4 mg  4 mg Oral Q6H PRN Mami Johnson PA-C        Or    ondansetron (ZOFRAN) injection 4 mg  4 mg Intravenous Q6H PRN Mami Johnson PA-C        ondansetron (ZOFRAN ODT) ODT tab 4 mg  4 mg  Oral Q6H PRN Nnamdi Ahumada MD        Or    ondansetron (ZOFRAN) injection 4 mg  4 mg Intravenous Q6H PRN Nnamdi Ahumada MD   4 mg at 11/02/24 0028    oxyCODONE (ROXICODONE) tablet 5 mg  5 mg Oral Q4H PRN Mami Johnson PA-C   5 mg at 11/02/24 2046    Or    oxyCODONE IR (ROXICODONE) tablet 10 mg  10 mg Oral Q4H PRN Mami Johnson PA-C   10 mg at 11/03/24 1417    oxyCODONE (ROXICODONE) tablet 5 mg  5 mg Oral Q4H PRN Nnamdi Ahumada MD   5 mg at 11/01/24 2238    oxyCODONE IR (ROXICODONE) half-tab 2.5 mg  2.5 mg Oral Q4H PRN Nnamdi Ahumada MD   2.5 mg at 11/01/24 1835    pantoprazole (PROTONIX) EC tablet 40 mg  40 mg Oral BID AC Nnamdi Ahumada MD   40 mg at 11/03/24 1608    polyethylene glycol (MIRALAX) Packet 17 g  17 g Oral Daily Mami Johnson PA-C   17 g at 11/03/24 0908    polyethylene glycol (MIRALAX) Packet 17 g  17 g Oral BID PRN Nnamdi Ahumada MD        potassium citrate (UROCIT-K) CR tablet 10 mEq  10 mEq Oral Daily Nnamdi Ahumada MD   10 mEq at 11/03/24 0913    prochlorperazine (COMPAZINE) injection 10 mg  10 mg Intravenous Q6H PRN Nnamdi Ahumada MD        Or    prochlorperazine (COMPAZINE) tablet 10 mg  10 mg Oral Q6H PRN Nnamdi Ahumada MD        Or    prochlorperazine (COMPAZINE) suppository 25 mg  25 mg Rectal Q12H PRN Nnamdi Ahumada MD        senna-docusate (SENOKOT-S/PERICOLACE) 8.6-50 MG per tablet 1 tablet  1 tablet Oral BID Mami Johnson PA-C   1 tablet at 11/03/24 0908    senna-docusate (SENOKOT-S/PERICOLACE) 8.6-50 MG per tablet 1 tablet  1 tablet Oral BID PRN Nnamdi Ahumada MD        Or    senna-docusate (SENOKOT-S/PERICOLACE) 8.6-50 MG per tablet 2 tablet  2 tablet Oral BID PRN Nnamdi Ahmuada MD        sodium chloride (PF) 0.9% PF flush 3 mL  3 mL Intracatheter Q8H Mami Johnson PA-C   3 mL at 11/03/24 0346    sodium chloride (PF) 0.9% PF flush 3 mL  3 mL Intracatheter q1 min  "prn Mami Johnson PA-C        sodium chloride (PF) 0.9% PF flush 3 mL  3 mL Intracatheter Q8H Nnamdi Ahumada MD   3 mL at 11/03/24 1306    sodium chloride (PF) 0.9% PF flush 3 mL  3 mL Intracatheter q1 min prn Nnamdi Ahumada MD        traZODone (DESYREL) tablet 100 mg  100 mg Oral At Bedtime PRN Nnamdi Ahumada MD   100 mg at 11/02/24 2253         LABORATORY DATA  Lab Results   Component Value Date    WBC 11.2 11/02/2024    WBC 6.0 05/10/2021     Lab Results   Component Value Date    RBC 4.07 11/02/2024    RBC 3.78 05/10/2021     Lab Results   Component Value Date    HGB 12.2 11/02/2024    HGB 10.7 05/10/2021     Lab Results   Component Value Date    HCT 37.6 11/02/2024    HCT 34.5 05/10/2021     No components found for: \"MCT\"  Lab Results   Component Value Date    MCV 92 11/02/2024    MCV 91 05/10/2021     Lab Results   Component Value Date    MCH 30.0 11/02/2024    MCH 28.3 05/10/2021     Lab Results   Component Value Date    MCHC 32.4 11/02/2024    MCHC 31.0 05/10/2021     Lab Results   Component Value Date    RDW 14.7 11/02/2024    RDW 15.8 05/10/2021     Lab Results   Component Value Date     11/02/2024     05/10/2021     Last Comprehensive Metabolic Panel:  Sodium   Date Value Ref Range Status   11/03/2024 140 135 - 145 mmol/L Final   05/10/2021 139 133 - 144 mmol/L Final     Potassium   Date Value Ref Range Status   11/03/2024 4.1 3.4 - 5.3 mmol/L Final   06/14/2022 4.0 3.4 - 5.3 mmol/L Final   05/10/2021 3.6 3.4 - 5.3 mmol/L Final     Chloride   Date Value Ref Range Status   11/03/2024 103 98 - 107 mmol/L Final   06/14/2022 106 94 - 109 mmol/L Final   05/10/2021 108 94 - 109 mmol/L Final     Carbon Dioxide   Date Value Ref Range Status   05/10/2021 28 20 - 32 mmol/L Final     Carbon Dioxide (CO2)   Date Value Ref Range Status   11/03/2024 28 22 - 29 mmol/L Final   06/14/2022 32 20 - 32 mmol/L Final     Anion Gap   Date Value Ref Range Status   11/03/2024 9 7 - 15 " mmol/L Final   06/14/2022 <1 (L) 3 - 14 mmol/L Final   05/10/2021 3 3 - 14 mmol/L Final     Glucose   Date Value Ref Range Status   06/14/2022 104 (H) 70 - 99 mg/dL Final   05/10/2021 169 (H) 70 - 99 mg/dL Final     GLUCOSE BY METER POCT   Date Value Ref Range Status   11/03/2024 160 (H) 70 - 99 mg/dL Final     Urea Nitrogen   Date Value Ref Range Status   11/03/2024 16.2 8.0 - 23.0 mg/dL Final   06/14/2022 21 7 - 30 mg/dL Final   05/10/2021 16 7 - 30 mg/dL Final     Creatinine   Date Value Ref Range Status   11/03/2024 0.91 0.51 - 0.95 mg/dL Final   05/10/2021 0.81 0.52 - 1.04 mg/dL Final     GFR Estimate   Date Value Ref Range Status   11/03/2024 71 >60 mL/min/1.73m2 Final     Comment:     eGFR calculated using 2021 CKD-EPI equation.   05/10/2021 80 >60 mL/min/[1.73_m2] Final     Comment:     Non  GFR Calc  Starting 12/18/2018, serum creatinine based estimated GFR (eGFR) will be   calculated using the Chronic Kidney Disease Epidemiology Collaboration   (CKD-EPI) equation.       GFR, ESTIMATED POCT   Date Value Ref Range Status   11/23/2021 35 (L) >60 mL/min/1.73m2 Final     Calcium   Date Value Ref Range Status   11/03/2024 9.0 8.8 - 10.4 mg/dL Final     Comment:     Reference intervals for this test were updated on 7/16/2024 to reflect our healthy population more accurately. There may be differences in the flagging of prior results with similar values performed with this method. Those prior results can be interpreted in the context of the updated reference intervals.   05/10/2021 9.0 8.5 - 10.1 mg/dL Final     Bilirubin Total   Date Value Ref Range Status   08/22/2024 0.4 <=1.2 mg/dL Final   05/10/2021 0.3 0.2 - 1.3 mg/dL Final     Alkaline Phosphatase   Date Value Ref Range Status   08/22/2024 123 40 - 150 U/L Final   05/10/2021 132 40 - 150 U/L Final     ALT   Date Value Ref Range Status   08/22/2024 32 0 - 50 U/L Final   05/10/2021 35 0 - 50 U/L Final     AST   Date Value Ref Range Status  "  08/22/2024 34 0 - 45 U/L Final   05/10/2021 24 0 - 45 U/L Final         MICROBIOLOGY  11/2/24 surg cx finger      IMPRESSION AND PLAN  60 yo woman  S/p dog bite 3 days ago to her L index finger  Tenosynovitis, rule out septic joint    S/p dog bite L hand  Tenosynovitis L 2nd finger  Rule out septic joints  Islet cell transplant after pancreatectomy for chronic pancreatitis    11/2/24 comments jls, \"Surgery today should help us get on top of this infection  Iv Unasyn remains the antibiotic option of choice in most animal bites, including dogs  Oral augmentin is still the appropriate oral conversion but she wonders about absorption with her pancreatic enzyme needs and we may need to stay with iv therapy for a bit if there is a septic joint  Once daily iv ertapenem may be an option on discharge if we stay with iv therapy  Continue iv Unasyn 3 g q 6 hrs now  Follow up on op cx and surgical findings of joint infection or not  Final discharge abx to be determined\"    Today 11/3/2024  She appears improved POD #1   No new cx information  Her post op dx is Left index finger dog bite, Left index finger PIP joint traumatic arthrotomy with open fracture.  PIP joint involvement may have us stay with iv therapy a little longer     Recommendations  Continue the iv Unasyn for now  I favor conversion to iv ertapenem on discharge but high does oral augmentin may be an option       Alexis Russo MD    "

## 2024-11-04 NOTE — PROGRESS NOTES
Orthopedic Surgery  Lynn Thompson  11/04/2024     Admit Date:  11/1/2024    POD: 2 Days Post-Op   Procedure(s):  1. Left index finger incision and debridement to the level of bone, dorsally, 2cm2. 2. Left index finger proximal interphalangial joint irrigation. 3. Left index finger incision and debridement to the level of the flexor tendon sheath, 3 cm2. 4. Left index finger A1 pulley release.     Patient resting comfortably in bed.    Pain improving in left hand and finger.  Tolerating oral intake.    Denies nausea or vomiting  Denies chest pain or shortness of breath    Temp:  [97.6  F (36.4  C)-98  F (36.7  C)] 97.6  F (36.4  C)  Pulse:  [58-59] 58  Resp:  [16] 16  BP: (106-139)/(46-85) 119/69  SpO2:  [91 %-95 %] 94 %    Alert and oriented.      LEFT INDEX FINGER:   Left hand dressing is changed at bedside.   Erythema and swelling decreasing.   Active and passive flexion of the index finger with minimal pain.   Incision is well approximated with Nylon sutures.   Capillary refill < 3 seconds.   Full bilateral shoulder, elbow range of motion.  5/5 finger abduction, EPL, FPL.  Sensation is intact to light touch in the median, ulnar, and radial nerve distributions.    Labs:  Recent Labs   Lab Test 11/04/24  0655 11/03/24  0734 11/02/24  0627   WBC 7.6 10.3 11.2*   HGB 10.7* 10.8* 12.2    332 360     Recent Labs   Lab Test 03/03/24  2046   INR 0.97     Recent Labs   Lab Test 11/01/24  0327   CRPI <3.00         1. Plan:  -Continue ambulation for DVT prophylaxis.    -Continue current pain regimen.  -IV abx per ID.   -Dressings: Change dressing daily to perform wound checks  - Gentle finger range of motion encouraged. No lifting, pushing, pulling with the left upper extremity.   -Follow-up: Wednesday post-op with PIERRE Bolaños. May call [phone] to schedule.      2. Disposition  -Anticipate d/c to home when medically cleared and plan for outpatient antibiotics in place    Claudia Webster PA-C

## 2024-11-04 NOTE — PLAN OF CARE
"Goal Outcome Evaluation:      Plan of Care Reviewed With: patient    Overall Patient Progress: improvingOverall Patient Progress: improving    Outcome Evaluation: Discharge home with Midline and home infusion    Ind  Pain managed with Oxy  Insulin pump active and doses appropriately - Glucometer not transferring data, BG 93@0830, 131@1146, 117@1745 (documented in \"comments\" of each MAR Insulin order)  Abx infused without complication  Midline to be placed - deferred to tomorrow by VAT RN d/t unknown abx for discharge   Makes needs known      Problem: Adult Inpatient Plan of Care  Goal: Plan of Care Review  Description: The Plan of Care Review/Shift note should be completed every shift.  The Outcome Evaluation is a brief statement about your assessment that the patient is improving, declining, or no change.  This information will be displayed automatically on your shift  note.  Outcome: Progressing  Flowsheets (Taken 11/4/2024 1725)  Outcome Evaluation: Discharge home with Midline and home infusion  Plan of Care Reviewed With: patient  Overall Patient Progress: improving  Goal: Patient-Specific Goal (Individualized)  Description: You can add care plan individualizations to a care plan. Examples of Individualization might be:  \"Parent requests to be called daily at 9am for status\", \"I have a hard time hearing out of my right ear\", or \"Do not touch me to wake me up as it startles  me\".  Outcome: Progressing  Goal: Absence of Hospital-Acquired Illness or Injury  Outcome: Progressing  Intervention: Identify and Manage Fall Risk  Recent Flowsheet Documentation  Taken 11/4/2024 0900 by Reyes O'Connor, Bridget M, RN  Safety Promotion/Fall Prevention:   clutter free environment maintained   increase visualization of patient   lighting adjusted   nonskid shoes/slippers when out of bed   patient and family education   room organization consistent   safety round/check completed  Intervention: Prevent Skin Injury  Recent " Flowsheet Documentation  Taken 11/4/2024 0900 by Reyes O'Connor, Bridget M, RN  Body Position: position changed independently  Goal: Optimal Comfort and Wellbeing  Outcome: Progressing  Intervention: Monitor Pain and Promote Comfort  Recent Flowsheet Documentation  Taken 11/4/2024 0923 by Reyes O'Connor, Bridget M, RN  Pain Management Interventions: medication (see MAR)  Goal: Readiness for Transition of Care  Outcome: Progressing

## 2024-11-05 ENCOUNTER — HOME INFUSION BILLING (OUTPATIENT)
Dept: HOME HEALTH SERVICES | Facility: HOME HEALTH | Age: 59
End: 2024-11-05
Payer: COMMERCIAL

## 2024-11-05 ENCOUNTER — HOME INFUSION (OUTPATIENT)
Dept: HOME HEALTH SERVICES | Facility: HOME HEALTH | Age: 59
End: 2024-11-05
Payer: COMMERCIAL

## 2024-11-05 VITALS
HEIGHT: 64 IN | DIASTOLIC BLOOD PRESSURE: 59 MMHG | BODY MASS INDEX: 25.82 KG/M2 | WEIGHT: 151.24 LBS | HEART RATE: 61 BPM | SYSTOLIC BLOOD PRESSURE: 139 MMHG | RESPIRATION RATE: 18 BRPM | TEMPERATURE: 98.4 F | OXYGEN SATURATION: 92 %

## 2024-11-05 DIAGNOSIS — S61.251A OPEN WOUND OF LEFT INDEX FINGER DUE TO DOG BITE: ICD-10-CM

## 2024-11-05 DIAGNOSIS — M65.10 INFECTIOUS TENOSYNOVITIS: Primary | ICD-10-CM
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
## 2024-11-05 DIAGNOSIS — L03.012 CELLULITIS OF LEFT INDEX FINGER: ICD-10-CM

## 2024-11-05 LAB
GLUCOSE BLDC GLUCOMTR-MCNC: 107 MG/DL (ref 70–99)
GLUCOSE BLDC GLUCOMTR-MCNC: 110 MG/DL (ref 70–99)
GLUCOSE BLDC GLUCOMTR-MCNC: 115 MG/DL (ref 70–99)
GLUCOSE BLDC GLUCOMTR-MCNC: 117 MG/DL (ref 70–99)
GLUCOSE BLDC GLUCOMTR-MCNC: 121 MG/DL (ref 70–99)
GLUCOSE BLDC GLUCOMTR-MCNC: 131 MG/DL (ref 70–99)
GLUCOSE BLDC GLUCOMTR-MCNC: 93 MG/DL (ref 70–99)

## 2024-11-05 PROCEDURE — 250N000011 HC RX IP 250 OP 636: Performed by: INTERNAL MEDICINE

## 2024-11-05 PROCEDURE — 250N000013 HC RX MED GY IP 250 OP 250 PS 637: Performed by: STUDENT IN AN ORGANIZED HEALTH CARE EDUCATION/TRAINING PROGRAM

## 2024-11-05 PROCEDURE — G0008 ADMIN INFLUENZA VIRUS VAC: HCPCS | Performed by: INTERNAL MEDICINE

## 2024-11-05 PROCEDURE — 272N000748 HC KIT, CATH 3FR OR 4FR SINGLE LUMEN POWERMIDLINE

## 2024-11-05 PROCEDURE — 99239 HOSP IP/OBS DSCHRG MGMT >30: CPT | Performed by: INTERNAL MEDICINE

## 2024-11-05 PROCEDURE — 90673 RIV3 VACCINE NO PRESERV IM: CPT | Performed by: INTERNAL MEDICINE

## 2024-11-05 PROCEDURE — 250N000011 HC RX IP 250 OP 636: Performed by: STUDENT IN AN ORGANIZED HEALTH CARE EDUCATION/TRAINING PROGRAM

## 2024-11-05 PROCEDURE — 250N000013 HC RX MED GY IP 250 OP 250 PS 637: Performed by: INTERNAL MEDICINE

## 2024-11-05 PROCEDURE — 36569 INSJ PICC 5 YR+ W/O IMAGING: CPT

## 2024-11-05 RX ORDER — PROCHLORPERAZINE 25 MG/1
SUPPOSITORY RECTAL
Qty: 3 EACH | Refills: 5 | Status: SHIPPED | OUTPATIENT
Start: 2024-11-05

## 2024-11-05 RX ADMIN — PANCRELIPASE 3 CAPSULE: 36000; 180000; 114000 CAPSULE, DELAYED RELEASE PELLETS ORAL at 12:26

## 2024-11-05 RX ADMIN — ESCITALOPRAM OXALATE 20 MG: 5 TABLET, FILM COATED ORAL at 09:02

## 2024-11-05 RX ADMIN — ONDANSETRON 4 MG: 4 TABLET, ORALLY DISINTEGRATING ORAL at 13:00

## 2024-11-05 RX ADMIN — FAMOTIDINE 40 MG: 20 TABLET ORAL at 09:02

## 2024-11-05 RX ADMIN — OXYCODONE HYDROCHLORIDE 10 MG: 10 TABLET ORAL at 08:59

## 2024-11-05 RX ADMIN — PANTOPRAZOLE SODIUM 40 MG: 40 TABLET, DELAYED RELEASE ORAL at 09:02

## 2024-11-05 RX ADMIN — PANCRELIPASE 3 CAPSULE: 36000; 180000; 114000 CAPSULE, DELAYED RELEASE PELLETS ORAL at 09:03

## 2024-11-05 RX ADMIN — POTASSIUM CITRATE 10 MEQ: 10 TABLET, EXTENDED RELEASE ORAL at 12:27

## 2024-11-05 RX ADMIN — ERTAPENEM SODIUM 1 G: 1 INJECTION, POWDER, LYOPHILIZED, FOR SOLUTION INTRAMUSCULAR; INTRAVENOUS at 12:26

## 2024-11-05 RX ADMIN — GABAPENTIN 300 MG: 300 CAPSULE ORAL at 09:02

## 2024-11-05 RX ADMIN — OXYCODONE HYDROCHLORIDE 10 MG: 10 TABLET ORAL at 13:16

## 2024-11-05 RX ADMIN — DULOXETINE HYDROCHLORIDE 20 MG: 20 CAPSULE, DELAYED RELEASE ORAL at 09:03

## 2024-11-05 RX ADMIN — LEVOTHYROXINE SODIUM 125 MCG: 0.03 TABLET ORAL at 09:01

## 2024-11-05 RX ADMIN — POLYETHYLENE GLYCOL 3350 17 G: 17 POWDER, FOR SOLUTION ORAL at 09:07

## 2024-11-05 RX ADMIN — INFLUENZA A VIRUS A/WEST VIRGINIA/30/2022 (H1N1) RECOMBINANT HEMAGGLUTININ ANTIGEN, INFLUENZA A VIRUS A/MASSACHUSETTS/18/2022 (H3N2) RECOMBINANT HEMAGGLUTININ ANTIGEN, AND INFLUENZA B VIRUS B/AUSTRIA/1359417/2021 RECOMBINANT HEMAGGLUTININ ANTIGEN 0.5 ML: 45; 45; 45 INJECTION INTRAMUSCULAR at 15:36

## 2024-11-05 RX ADMIN — SENNOSIDES AND DOCUSATE SODIUM 1 TABLET: 8.6; 5 TABLET ORAL at 09:02

## 2024-11-05 ASSESSMENT — ACTIVITIES OF DAILY LIVING (ADL)
ADLS_ACUITY_SCORE: 0

## 2024-11-05 NOTE — PROCEDURES
[facility]    Single Lumen Midline Placement    Date/Time: 11/5/2024 10:00 AM    Performed by: Suad Caro RN  Authorized by: Igor Marrero MD  Indications: vascular access      UNIVERSAL PROTOCOL   Site Marked: Yes  Prior Images Obtained and Reviewed:  Yes  Required items: Required blood products, implants, devices and special equipment available    Patient identity confirmed:  Verbally with patient, arm band, provided demographic data and hospital-assigned identification number  NA - No sedation, light sedation, or local anesthesia  Confirmation Checklist:  Patient's identity using two indicators, procedure was appropriate and matched the consent or emergent situation, relevant allergies and correct equipment/implants were available  Time out: Immediately prior to the procedure a time out was called    Universal Protocol: the Joint Commission Universal Protocol was followed    Preparation: Patient was prepped and draped in usual sterile fashion       ANESTHESIA    Local Anesthetic:  Lidocaine 1% without epinephrine  Anesthetic Total (mL):  2      SEDATION    Patient Sedated: No        Preparation: skin prepped with ChloraPrep  Skin prep agent: skin prep agent completely dried prior to procedure  Sterile barriers: maximum sterile barriers were used: cap, mask, sterile gown, sterile gloves, and large sterile sheet  Hand hygiene: hand hygiene performed prior to central venous catheter insertion  Type of line used: Midline  Catheter type: single lumen  Lumen type: non-valved  Catheter size: 4 Fr  Brand: Bard  Lot number: [id number]  Placement method: venipuncture, MST and ultrasound  Number of attempts: 1  Difficulty threading catheter: no  Successful placement: yes  Orientation: left  Catheter to Vein (%): 36  Location: basilic vein  Tip Location: distal to axillary vein  Arm circumference: adults 10 cm  Extremity circumference: 33  Visible catheter length: 4  Internal length: 10  cm  Total catheter length: 14  Dressing and securement: chlorhexidine patch applied, gloves changed prior to final dressing, secure lock, sterile dressing applied, blood removed and subcutaneous anchor securement system  Post procedure assessment: blood return through all ports and free fluid flow  PROCEDURE   Patient Tolerance:  Patient tolerated the procedure well with no immediate complicationsDescribe Procedure: Midline Okay to use, Bedside RN aware. No history of LDA per chart review.

## 2024-11-05 NOTE — PROGRESS NOTES
[facility]    Received request for benefit check should pt require home IV abx. Lynn has coverage for IV abx with their BCBS plan.  Ded of $3200 has been met in full. Pt now has 100% coverage.     I spoke with pt to introduce home infusion services and review benefits. She would like to proceed with [facility] for home IV abx needs. I will meet with pt at bedside today at 1330h for IV teaching. In the event she is unable to manage home infusions due to left hand injury, her sister will be able to assist. Pt reports her sister lives close and is a nurse.     Thank you for the referral.    Gricelda Clay RN  [facility] Liaison  [phone] (Mon thru Fri 8am - 5pm)  [phone] Office

## 2024-11-05 NOTE — PROGRESS NOTES
Care Management Discharge Note    Discharge Date: 11/05/2024     Discharge Disposition: Home    Discharge Services:  home infusion    Discharge Transportation: family or friend will provide    Private pay costs discussed: Not applicable    Does the patient's insurance plan have a 3 day qualifying hospital stay waiver?  No    PAS Confirmation Code:  n/a  Patient/family educated on Medicare website which has current facility and service quality ratings:  yes    Education Provided on the Discharge Plan:  yes  Persons Notified of Discharge Plans: patient, [facility] liaison   Patient/Family in Agreement with the Plan:  yes      Additional Information:  Update from [facility] regarding benefit check. Patient has 100% coverage as she has met her $3200 ded for the year. [facility] liaison has talked with patient and reviewed benefits. Patient would like to move forward with discharge to home with home infusion through [facility]. [facility] liaison will meet with patient in room at 1330 for teaching. Patient should discharge to home after that. No further discharge needs noted at this time.       Sharyn Jenkins RN  Care Coordinator  [facility]  [phone]

## 2024-11-05 NOTE — PROGRESS NOTES
Doing well, patient verified for coverage for home IV abx.  Exam stable this AM.  Midline placed today.   Tentative plan for d/c home later today with home IV ertapenem (course per ID).   Full d/c summary to follow.

## 2024-11-05 NOTE — PROGRESS NOTES
Orthopedic Surgery  Lynn Thompson  11/05/2024     Admit Date:  11/1/2024  POD: 3 Days Post-Op   Procedure(s):  Left index finger incision and debridement to the level of bone, dorsally, 2cm2. Left index finger proximal interphalangial joint irrigation. Left index finger incision and debridement to the level of the flexor tendon sheath, 3 cm2.  Left index finger A1 pulley release.     Patient resting comfortably in bed.    Pain improving in left hand and finger.  Tolerating oral intake.    Denies nausea or vomiting  Denies chest pain or shortness of breath    Temp:  [97  F (36.1  C)-98.4  F (36.9  C)] 98.4  F (36.9  C)  Pulse:  [60-61] 61  Resp:  [16-18] 18  BP: (128-139)/(54-59) 139/59  SpO2:  [91 %-94 %] 92 %    Alert and oriented.      LEFT INDEX FINGER:   Left hand dressing is changed at bedside.   Erythema and swelling decreasing.   Active and passive flexion of the index finger with minimal pain.   Incision is well approximated with Nylon sutures.   Capillary refill < 3 seconds.   5/5 finger abduction, EPL, FPL.  Sensation is intact to light touch in the median, ulnar, and radial nerve distributions.    Labs:  Recent Labs   Lab Test 11/04/24  0655 11/03/24  0734 11/02/24  0627   WBC 7.6 10.3 11.2*   HGB 10.7* 10.8* 12.2    332 360     Recent Labs   Lab Test 03/03/24  2046   INR 0.97     Recent Labs   Lab Test 11/01/24  0327   CRPI <3.00       1. Plan:  -Continue ambulation for DVT prophylaxis.    -Continue current pain regimen.  -IV abx per ID.   -Dressings: Change dressing daily to perform wound checks  - Gentle finger range of motion encouraged. No lifting, pushing, pulling with the left upper extremity.   -Follow-up with PIERRE Bolaños. May call [phone] to schedule.      2. Disposition  -Anticipate d/c to home when medically cleared and plan for outpatient antibiotics in place    Claudia Webster PA-C

## 2024-11-05 NOTE — PROGRESS NOTES
Home Infusion  Lynn will be discharging today and going home on IV ertapenem q24.  I met with Lynn and her ,  Tera at bedside and instructed in IV administration via SL midline with SAS flushing.   Had Lynn perform hands on with practice equipment and teaching sheets. Provided information about supplies and supply delivery, storage of medication, checking of label, dosing times, plan for SNV and 24/7 availability of I staff. [facility] will follow for home RN.   Lynn verbalized understanding of information given. She demonstrated very good technique with practice and verbalized good understanding of process.  Stated she feels comfortable with administering abx in the home setting.   Dosing schedule: Pt will dose around 1200h in the home setting.   Delivery: medication and supplies will be delivered to the pt's home by end of day.   Lynn is ready for discharge from [facility] perspective.    Gricelda Clay RN  [facility] Liaison  [phone] (M-F 8a-5p)  [phone] Office

## 2024-11-05 NOTE — PLAN OF CARE
"Goal Outcome Evaluation:      Plan of Care Reviewed With: patient    Overall Patient Progress: improvingOverall Patient Progress: improving    Outcome Evaluation: Possible discharge home with midline and home ABX infusion      Diet: Reg  Mental Status:  A&O x 4  O2: RA  Pain: Tylenol and PRN Oxy   Mobility:  up ad xenia/ ind in room  LDA's: left index finger surgical site   Consults: ortho, ID   Other Cares: Bg monitoring   GI/: Voiding. Nausea x 1. PRN Zofran effective   Discharge Disposition:  home   Discharge Time: pending       Problem: Adult Inpatient Plan of Care  Goal: Plan of Care Review  Description: The Plan of Care Review/Shift note should be completed every shift.  The Outcome Evaluation is a brief statement about your assessment that the patient is improving, declining, or no change.  This information will be displayed automatically on your shift  note.  11/5/2024 0651 by Shelia Levine RN  Outcome: Progressing  Flowsheets (Taken 11/5/2024 0651)  Plan of Care Reviewed With: patient  Overall Patient Progress: improving  11/5/2024 0650 by Shelia Levine RN  Outcome: Progressing  Flowsheets (Taken 11/5/2024 0650)  Outcome Evaluation: Possible discharge home with midline and home ABX infusion  Plan of Care Reviewed With: patient  Overall Patient Progress: improving  Goal: Patient-Specific Goal (Individualized)  Description: You can add care plan individualizations to a care plan. Examples of Individualization might be:  \"Parent requests to be called daily at 9am for status\", \"I have a hard time hearing out of my right ear\", or \"Do not touch me to wake me up as it startles  me\".  11/5/2024 0651 by Shelia Levine RN  Outcome: Progressing  11/5/2024 0650 by Shelia Levine RN  Outcome: Progressing  Goal: Absence of Hospital-Acquired Illness or Injury  11/5/2024 0651 by Shelia Levine RN  Outcome: Progressing  11/5/2024 0650 by Shelia Levine, " RN  Outcome: Progressing  Intervention: Identify and Manage Fall Risk  Recent Flowsheet Documentation  Taken 11/4/2024 2240 by Shelia Levine RN  Safety Promotion/Fall Prevention:   clutter free environment maintained   safety round/check completed  Intervention: Prevent Infection  Recent Flowsheet Documentation  Taken 11/4/2024 2240 by Shelia Levine RN  Infection Prevention:   rest/sleep promoted   single patient room provided  Goal: Optimal Comfort and Wellbeing  11/5/2024 0651 by Shelia Levine RN  Outcome: Progressing  11/5/2024 0650 by Shelia Levine RN  Outcome: Progressing  Intervention: Monitor Pain and Promote Comfort  Recent Flowsheet Documentation  Taken 11/4/2024 2138 by Shelia Levine RN  Pain Management Interventions: medication (see MAR)  Goal: Readiness for Transition of Care  11/5/2024 0651 by Shelia Levine RN  Outcome: Progressing  11/5/2024 0650 by Shelia Levine RN  Outcome: Progressing     Problem: Skin or Soft Tissue Infection  Goal: Absence of Infection Signs and Symptoms  11/5/2024 0651 by Shelia Levine RN  Outcome: Progressing  11/5/2024 0650 by Shelia Levine RN  Outcome: Progressing  Intervention: Minimize and Manage Infection Progression  Recent Flowsheet Documentation  Taken 11/4/2024 2240 by Shelia Levine RN  Infection Prevention:   rest/sleep promoted   single patient room provided     Problem: Pain Acute  Goal: Optimal Pain Control and Function  11/5/2024 0651 by Shelia Levine RN  Outcome: Progressing  11/5/2024 0650 by Shelia Levine RN  Outcome: Progressing  Intervention: Develop Pain Management Plan  Recent Flowsheet Documentation  Taken 11/4/2024 2138 by Shelia Levine RN  Pain Management Interventions: medication (see MAR)  Intervention: Prevent or Manage Pain  Recent Flowsheet Documentation  Taken 11/4/2024 2240 by Shelia Levine  RN  Medication Review/Management:   medications reviewed   high-risk medications identified     Problem: Comorbidity Management  Goal: Maintenance of Asthma Control  11/5/2024 0651 by Shelia Levine RN  Outcome: Progressing  11/5/2024 0650 by Shelia Levine RN  Outcome: Progressing  Intervention: Maintain Asthma Symptom Control  Recent Flowsheet Documentation  Taken 11/4/2024 2240 by Shelia Levine RN  Medication Review/Management:   medications reviewed   high-risk medications identified  Goal: Maintenance of Behavioral Health Symptom Control  11/5/2024 0651 by Shelia Levine RN  Outcome: Progressing  11/5/2024 0650 by Shelia Levine RN  Outcome: Progressing  Intervention: Maintain Behavioral Health Symptom Control  Recent Flowsheet Documentation  Taken 11/4/2024 2240 by Shelia Levine RN  Medication Review/Management:   medications reviewed   high-risk medications identified  Goal: Maintenance of COPD Symptom Control  11/5/2024 0651 by Shelia Levine RN  Outcome: Progressing  11/5/2024 0650 by Shelia Levine RN  Outcome: Progressing  Intervention: Maintain COPD Symptom Control  Recent Flowsheet Documentation  Taken 11/4/2024 2240 by Shelia Levine RN  Medication Review/Management:   medications reviewed   high-risk medications identified  Goal: Blood Glucose Levels Within Targeted Range  11/5/2024 0651 by Shelia Levine RN  Outcome: Progressing  11/5/2024 0650 by Shelia Levine RN  Outcome: Progressing  Intervention: Monitor and Manage Glycemia  Recent Flowsheet Documentation  Taken 11/4/2024 2240 by Shelia Levine RN  Medication Review/Management:   medications reviewed   high-risk medications identified  Goal: Maintenance of Heart Failure Symptom Control  11/5/2024 0651 by Shelia Levine RN  Outcome: Progressing  11/5/2024 0650 by Shelia Levine RN  Outcome:  Progressing  Intervention: Maintain Heart Failure Management  Recent Flowsheet Documentation  Taken 11/4/2024 2240 by Shelia Levine RN  Medication Review/Management:   medications reviewed   high-risk medications identified  Goal: Blood Pressure in Desired Range  11/5/2024 0651 by Shelia Levine RN  Outcome: Progressing  11/5/2024 0650 by Shelia Levine RN  Outcome: Progressing  Intervention: Maintain Blood Pressure Management  Recent Flowsheet Documentation  Taken 11/4/2024 2240 by Shelia Levine RN  Medication Review/Management:   medications reviewed   high-risk medications identified  Goal: Maintenance of Osteoarthritis Symptom Control  11/5/2024 0651 by Shelia Levine RN  Outcome: Progressing  11/5/2024 0650 by Shelia Levine RN  Outcome: Progressing  Intervention: Maintain Osteoarthritis Symptom Control  Recent Flowsheet Documentation  Taken 11/4/2024 2240 by Shelia Levine RN  Activity Management: up ad xenia  Medication Review/Management:   medications reviewed   high-risk medications identified  Goal: Bariatric Home Regimen Maintained  11/5/2024 0651 by Shelia Levine RN  Outcome: Progressing  11/5/2024 0650 by Shelia Levine RN  Outcome: Progressing  Intervention: Maintain and Manage Postbariatric Surgery Care  Recent Flowsheet Documentation  Taken 11/4/2024 2240 by Shelia Levine RN  Medication Review/Management:   medications reviewed   high-risk medications identified  Goal: Maintenance of Seizure Control  11/5/2024 0651 by Shelia Levine RN  Outcome: Progressing  11/5/2024 0650 by Shelia Levine RN  Outcome: Progressing  Intervention: Maintain Seizure Symptom Control  Recent Flowsheet Documentation  Taken 11/4/2024 2240 by Shelia Levine RN  Medication Review/Management:   medications reviewed   high-risk medications identified     Problem: Infection  Goal: Absence of  Infection Signs and Symptoms  11/5/2024 0651 by Shelia Levine RN  Outcome: Progressing  11/5/2024 0650 by Shelia Levine RN  Outcome: Progressing     Problem: Fall Injury Risk  Goal: Absence of Fall and Fall-Related Injury  11/5/2024 0651 by Shelia Levine RN  Outcome: Progressing  11/5/2024 0650 by Shelia Levine RN  Outcome: Progressing  Intervention: Identify and Manage Contributors  Recent Flowsheet Documentation  Taken 11/4/2024 2240 by Shelia Levine RN  Medication Review/Management:   medications reviewed   high-risk medications identified  Intervention: Promote Injury-Free Environment  Recent Flowsheet Documentation  Taken 11/4/2024 2240 by Shelia Levine RN  Safety Promotion/Fall Prevention:   clutter free environment maintained   safety round/check completed

## 2024-11-05 NOTE — PROGRESS NOTES
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  Dog fully vaccinated          Interval History:     no new complaints and doing well; no cp, sob, n/v/d, or abd pain.  Finger feels okay, no major fever or systemic symptoms, cultures are negative on antibiotic              Medications:     Current Facility-Administered Medications   Medication Dose Route Frequency Provider Last Rate Last Admin    acetaminophen (TYLENOL) tablet 1,000 mg  1,000 mg Oral Q8H Andrew Fowler MD   1,000 mg at 11/04/24 2138    DULoxetine (CYMBALTA) DR capsule 20 mg  20 mg Oral Daily Nnamdi Ahumada MD   20 mg at 11/05/24 0903    ertapenem (INVanz) 1 g vial to attach to  mL bag  1 g Intravenous Q24H Igor Marrero MD   1 g at 11/05/24 1226    escitalopram (LEXAPRO) tablet 20 mg  20 mg Oral Daily Nnamdi Ahumada MD   20 mg at 11/05/24 0902    famotidine (PEPCID) tablet 40 mg  40 mg Oral BID Nnamdi Ahumada MD   40 mg at 11/05/24 0902    gabapentin (NEURONTIN) capsule 300 mg  300 mg Oral BID Nnamdi Ahumada MD   300 mg at 11/05/24 0902    influenza trivalent vaccine for ages 50-64 years (PF) (FLUBLOK) injection 0.5 mL  0.5 mL Intramuscular Prior to discharge Sathish Alexandra, DO        insulin aspart  "(NovoLOG/FIASP) 100 UNIT/ML VIAL FOR FILLING PUMP RESERVOIR   Device See Admin Instructions Nnamdi Ahumada MD        insulin bolus from AMBULATORY PUMP   Subcutaneous 4x Daily AC & HS Andrew Fowler MD   2.9 Units at 11/05/24 1309    insulin bolus from AMBULATORY PUMP   Subcutaneous TID AC Nnamdi Ahumada MD   Given at 11/05/24 0910    levothyroxine (SYNTHROID/LEVOTHROID) tablet 125 mcg  125 mcg Oral QAM AC Nnamdi Ahumada MD   125 mcg at 11/05/24 0901    lipase-protease-amylase (CREON 36) 20649-264650-559501 units per EC Capsule 3 capsule  3 capsule Oral TID w/meals Nnamdi Ahumada MD   3 capsule at 11/05/24 1226    pantoprazole (PROTONIX) EC tablet 40 mg  40 mg Oral BID AC Nnamdi Ahumada MD   40 mg at 11/05/24 0902    polyethylene glycol (MIRALAX) Packet 17 g  17 g Oral Daily Mami Johnson PA-C   17 g at 11/05/24 0907    potassium citrate (UROCIT-K) CR tablet 10 mEq  10 mEq Oral Daily Nnamdi Ahumada MD   10 mEq at 11/05/24 1227    senna-docusate (SENOKOT-S/PERICOLACE) 8.6-50 MG per tablet 1 tablet  1 tablet Oral BID Mami Johnson PA-C   1 tablet at 11/05/24 0902    sodium chloride (PF) 0.9% PF flush 3 mL  3 mL Intracatheter Q8H Nnamdi Ahumada MD   3 mL at 11/05/24 1232                  Physical Exam:   Blood pressure 139/59, pulse 61, temperature 98.4  F (36.9  C), temperature source Temporal, resp. rate 18, height 1.626 m (5' 4\"), weight 68.6 kg (151 lb 3.8 oz), last menstrual period 12/19/2013, SpO2 92%, not currently breastfeeding.  Wt Readings from Last 2 Encounters:   11/01/24 68.6 kg (151 lb 3.8 oz)   10/31/24 61.2 kg (135 lb)     Vital Signs with Ranges  Temp:  [97  F (36.1  C)-98.4  F (36.9  C)] 98.4  F (36.9  C)  Pulse:  [60-61] 61  Resp:  [16-18] 18  BP: (128-139)/(54-59) 139/59  SpO2:  [91 %-94 %] 92 %    Constitutional: Awake, alert, cooperative, no apparent distress     Lungs: Clear to auscultation bilaterally, no crackles or " wheezing   Cardiovascular: Regular rate and rhythm, normal S1 and S2, and no murmur noted   Abdomen: Normal bowel sounds, soft, non-distended, non-tender   Skin: No rashes, no cyanosis, no edema   Other:           Data:   All microbiology laboratory data reviewed.  Recent Labs   Lab Test 11/04/24  0655 11/03/24  0734 11/02/24  0627   WBC 7.6 10.3 11.2*   HGB 10.7* 10.8* 12.2   HCT 34.0* 33.5* 37.6   MCV 95 93 92    332 360     Recent Labs   Lab Test 11/03/24  0734 11/02/24  0627 11/01/24  0327   CR 0.91 0.78 0.96*     Recent Labs   Lab Test 11/01/24  0327   SED 10     Recent Labs   Lab Test 03/01/21  0228 03/01/21  0207 02/28/21  0517 02/28/21  0257 02/28/21  0256 10/30/20  0940 10/28/20  2344 10/28/20  2332 06/11/20  0833   CULT No growth No growth No growth No growth No growth No anaerobes isolated  No growth No growth No growth >100,000 colonies/mL  Escherichia coli  *

## 2024-11-06 ENCOUNTER — TRANSFERRED RECORDS (OUTPATIENT)
Dept: HEALTH INFORMATION MANAGEMENT | Facility: CLINIC | Age: 59
End: 2024-11-06

## 2024-11-06 LAB
BACTERIA BLD CULT: NO GROWTH
BACTERIA TISS BX CULT: ABNORMAL

## 2024-11-07 ENCOUNTER — HOME CARE VISIT (OUTPATIENT)
Dept: HOME HEALTH SERVICES | Facility: HOME HEALTH | Age: 59
End: 2024-11-07
Payer: COMMERCIAL

## 2024-11-07 ENCOUNTER — LAB REQUISITION (OUTPATIENT)
Dept: LAB | Facility: CLINIC | Age: 59
End: 2024-11-07
Payer: COMMERCIAL

## 2024-11-07 ENCOUNTER — TELEPHONE (OUTPATIENT)
Dept: PHARMACY | Facility: CLINIC | Age: 59
End: 2024-11-07
Payer: COMMERCIAL

## 2024-11-07 VITALS
DIASTOLIC BLOOD PRESSURE: 76 MMHG | RESPIRATION RATE: 16 BRPM | SYSTOLIC BLOOD PRESSURE: 126 MMHG | TEMPERATURE: 97.1 F | OXYGEN SATURATION: 98 % | WEIGHT: 140 LBS | HEART RATE: 68 BPM | BODY MASS INDEX: 24.03 KG/M2

## 2024-11-07 DIAGNOSIS — L03.012 CELLULITIS OF LEFT FINGER: ICD-10-CM

## 2024-11-07 LAB
ALT SERPL W P-5'-P-CCNC: 26 U/L (ref 0–50)
AST SERPL W P-5'-P-CCNC: 30 U/L (ref 0–45)
BASOPHILS # BLD AUTO: 0.1 10E3/UL (ref 0–0.2)
BASOPHILS NFR BLD AUTO: 2 %
CREAT SERPL-MCNC: 1.05 MG/DL (ref 0.51–0.95)
CRP SERPL-MCNC: 4.76 MG/L
EGFRCR SERPLBLD CKD-EPI 2021: 60 ML/MIN/1.73M2
EOSINOPHIL # BLD AUTO: 0.3 10E3/UL (ref 0–0.7)
EOSINOPHIL NFR BLD AUTO: 5 %
ERYTHROCYTE [DISTWIDTH] IN BLOOD BY AUTOMATED COUNT: 14.3 % (ref 10–15)
HCT VFR BLD AUTO: 40.7 % (ref 35–47)
HGB BLD-MCNC: 13 G/DL (ref 11.7–15.7)
IMM GRANULOCYTES # BLD: 0 10E3/UL
IMM GRANULOCYTES NFR BLD: 0 %
LYMPHOCYTES # BLD AUTO: 1.6 10E3/UL (ref 0.8–5.3)
LYMPHOCYTES NFR BLD AUTO: 28 %
MCH RBC QN AUTO: 29.5 PG (ref 26.5–33)
MCHC RBC AUTO-ENTMCNC: 31.9 G/DL (ref 31.5–36.5)
MCV RBC AUTO: 93 FL (ref 78–100)
MONOCYTES # BLD AUTO: 0.6 10E3/UL (ref 0–1.3)
MONOCYTES NFR BLD AUTO: 11 %
NEUTROPHILS # BLD AUTO: 3.1 10E3/UL (ref 1.6–8.3)
NEUTROPHILS NFR BLD AUTO: 55 %
NRBC # BLD AUTO: 0 10E3/UL
NRBC BLD AUTO-RTO: 0 /100
PLATELET # BLD AUTO: 460 10E3/UL (ref 150–450)
RBC # BLD AUTO: 4.4 10E6/UL (ref 3.8–5.2)
WBC # BLD AUTO: 5.7 10E3/UL (ref 4–11)

## 2024-11-07 PROCEDURE — 99601 HOME NFS VISIT <2 HRS: CPT

## 2024-11-07 PROCEDURE — 84450 TRANSFERASE (AST) (SGOT): CPT | Performed by: INTERNAL MEDICINE

## 2024-11-07 PROCEDURE — 85004 AUTOMATED DIFF WBC COUNT: CPT | Performed by: INTERNAL MEDICINE

## 2024-11-07 PROCEDURE — 84460 ALANINE AMINO (ALT) (SGPT): CPT | Performed by: INTERNAL MEDICINE

## 2024-11-07 PROCEDURE — 86140 C-REACTIVE PROTEIN: CPT | Performed by: INTERNAL MEDICINE

## 2024-11-07 PROCEDURE — 82565 ASSAY OF CREATININE: CPT | Performed by: INTERNAL MEDICINE

## 2024-11-07 NOTE — TELEPHONE ENCOUNTER
Call from Starla at Lab - she is needing clarification on lab diagnosis and MD ordering for lab specimen. Reviewed diagnosis and MD following. Nasra Cordova RN on 11/7/2024 at 1:50 PM

## 2024-11-07 NOTE — PROGRESS NOTES
Nursing Visit Note:  Nurse visit today for SNV for SOC,CLC,labs  for Lynn Thompson.     present during visit today: Not Applicable.     Education Provided:     Patient Education focused on sOC consent and service contract reviewed and signed by patient.  Reviewed Welcome booklet and all questions answered.  Patient reviewed steps of line flushing before administration of antibiotic via HP. Patient reviewed flushing post administration as well.  Patient independent with medication administration.  Patient states understanding of when to call [facility] and 24/7 pharm and RN on call.  . Next visit plan SNV 11/14/24 for GA, labs and CLC.       Nurys Fishman RN 11/07/24   , Lab-Only Nursing Note    Labs obtained via VPT    [facility] Tracking number: [id number]    Facility sent to: [facility]    Next Visit Plan:   SNV for GA,CLC on ML,labs on 11/14/24    Nurys Fishman RN 11/7/2024  , and Patient states understanding and independent with care of midline and abx administration.       Nurys Fishman RN 11/7/2024

## 2024-11-08 ENCOUNTER — HOME INFUSION BILLING (OUTPATIENT)
Dept: HOME HEALTH SERVICES | Facility: HOME HEALTH | Age: 59
End: 2024-11-08
Payer: COMMERCIAL

## 2024-11-09 LAB — BACTERIA TISS BX CULT: NORMAL

## 2024-11-09 NOTE — DISCHARGE SUMMARY
[facility]  Discharge Summary  Hospitalist    Date of Admission:  11/1/2024  Date of Discharge:  11/5/2024  4:05 PM  Provider:  Sathish Alexandra DO, FHM    Discharge Diagnoses   Left second finger cellulitis/tenosynovitis following dog bite  Type I DM complicating #1    Other medical issues:  Past Medical History:   Diagnosis Date    Benign paroxysmal positional vertigo     occ.     Calculus of kidney 05/2005    x1 on L side passed, several stones.  Has been tested for oxalate.    Chronic abdominal pain 07/17/2013    Chronic pancreatitis 07/17/2013    Depression     also occ panic spells    Diabetes (H) 5/10/2013    Total Pancreatomy with Auto Islets Transplant    Dyspepsia 06/1999    H. 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  Please see the admission history and physical for full details.    Hospital Course   Lynn Thompson presenting on 11/1/2024.  The following problems were addressed during her hospitalization:    Patient presented following a dog bite to her hand.  She had an updated tetanus.  Initially treated with antibiotics but worsened and concern for tenosynovitis.  She was seen by orthopedics and underwent surgical incision/debridement.  Antibiotic plan was developed with surgical and infectious disease teams.  Given concern over severity of infection was recommended for a couple more weeks of IV ertapenem.  ID is coordinating antibiotic orders and home infusions are being arranged.      Significant  "Results and Procedures   Procedure:  (see op report for full details)  Left index finger incision and debridement to the level of bone, dorsally, 2cm2.  Left index finger PIP joint irrigation.  Left index finger incision and debridement to the level of the flexor tendon sheath, 3 cm2.  Left index finger A1 pulley release      Pending Results     Unresulted Labs Ordered in the Past 30 Days of this Admission       Date and Time Order Name Status Description    11/2/2024  8:37 AM Anaerobic Bacterial Culture Routine Preliminary             Code Status   Full Code       Primary Care Physician   Maryana Brooks    Blood pressure 139/59, pulse 61, temperature 98.4  F (36.9  C), temperature source Temporal, resp. rate 18, height 1.626 m (5' 4\"), weight 68.6 kg (151 lb 3.8 oz), last menstrual period 12/19/2013, SpO2 92%, not currently breastfeeding.  Alert, oriented, appears comfortable.  Heart regular, lungs clear, surgical site dressed/stable.    Discharge Disposition   Discharged to home    Consultations This Hospital Stay   ORTHOPEDIC SURGERY IP CONSULT  PHARMACY IP CONSULT  INFECTIOUS DISEASES IP CONSULT  INFECTIOUS DISEASES IP CONSULT  CARE MANAGEMENT / SOCIAL WORK IP CONSULT  VASCULAR ACCESS ADULT IP CONSULT  CARE MANAGEMENT / SOCIAL WORK IP CONSULT    Time Spent on this Encounter   ISathish DO, personally saw the patient today and spent greater than 30 minutes discharging this patient.    Discharge Orders      Home Care Referral      Home Infusion Referral      Reason for your hospital stay    S/p left index finger I&D (DOS: 11/2/24) with Dr. Aileen Reza     Activity    Your activity upon discharge: Non weight bearing left index finger. Okay to WB through the wrist. Okay for gentle ROM.     Wound care and dressings    Instructions to care for your wound at home: Left index finger daily dressing changes with adaptic, cast padding, ace bandage.     Discharge Instructions    Pain after surgery is normal " and expected.  You will have some amount of pain and swelling for several weeks after surgery.  These symptoms will improve with time.  There are several things you can do to help reduce your pain including: rest, cold compresses, elevation, and using pain medications as needed. Contact your Surgeon Team if you have pain that persists or worsens after surgery despite rest, ice, elevation, and taking your medication(s) as prescribed. Contact your Surgeon Team if you have new numbness, tingling, or weakness in your operative extremity.    Swelling and/or bruising of the surgical extremity is common and may persist for several months after surgery. In addition to frequent icing and elevation, gentle compressive support with an ACE wrap or tubigrip may help with swelling. Apply compression regularly, removing at least twice daily to perform skin checks. Contact your Surgeon Team if your swelling increases and is NOT associated with an increase in your activity level, or if your swelling increases and is associated with redness and pain.    Ice can be used to control swelling and discomfort after surgery. Place a thin towel over your operative site and apply the ice pack overtop. Leave ice pack in place for 20 minutes, then remove for 20 minutes. Repeat this 20 minutes on/20 minutes off routine as often as tolerated.    Please contact your surgical team with any concerns for infection including increasing redness around your surgical site, pus-like drainage, fever, chills, or flu-like symptoms.     Follow-up and recommended labs and tests     1.  Please call to schedule a follow up appointment with Dr. Aileen Reza/Mami Johnson PA-C ([facility]) at 1 week postop (~11/6-8) for wound care and reevaluation. Please call Dr. Reza's care coordinator, Hannah, [phone] to set up the appointment or if any questions or concerns arise regarding your orthopedic injury/surgery. No  "tests/imaging are needed prior to this appointment.   2.  Recommend Follow-up Primary care provider next week (you should schedule at a time convenient for you)    If any questions arise after regular business hours or on weekends, please call the on-call orthopedic provider at [phone].     Diet    Follow this diet upon discharge: Regular Diabetic     Discharge Medications   Discharge Medication List as of 11/5/2024  3:18 PM        START taking these medications    Details   oxyCODONE (ROXICODONE) 5 MG tablet Take 0.5-1 tablets (2.5-5 mg) by mouth every 4 hours as needed for moderate to severe pain., Disp-8 tablet, R-0, Local Print      polyethylene glycol (MIRALAX) 17 GM/Dose powder Take 17 g by mouth daily., Disp-510 g, R-0, E-Prescribe      Emergency Supply Kit, Central, Patient use for emergency only. Contents: 3 sodium chloride 0.9% flushes, 1 dressing kit, 1 microclave ext set 14\", 4 nitrile gloves (med), 6 alcohol prep pads, 1 bacitracin, 1 syringe (10 cc 20 G 1\"). Call [phone] to reorder., Disp-883809 kit, R- 0, Normal      ertapenem (INVanz) 1 g in sodium chloride 0.9 % 50 mL via HOMEPUMP Infuse 1 g over 60 minutes into the vein every 24 hours for 13 days.Normal, Disp-293804 mL, R-0      sodium chloride, PF, 0.9% PF flush Inject 10 mLs into the vein as needed for line flush. Flush IV before and after medication administration as directed and/or at least every 12 hours., Disp-600859 mL, R-0, Normal           CONTINUE these medications which have CHANGED    Details   acetaminophen (TYLENOL) 500 MG tablet Take 2 tablets (1,000 mg) by mouth every 8 hours as needed for mild pain., Disp-60 tablet, R-0, E-Prescribe           CONTINUE these medications which have NOT CHANGED    Details   blood glucose (CONTOUR NEXT TEST) test strip Use to test blood sugar 4-6 times daily or as directed., Disp-600 strip, R-4, E-Prescribe      blood glucose (NO BRAND SPECIFIED) lancets standard Use to test blood sugar 4-6 " times daily or as directed.Disp-360 Lancet, [id number]      calcium carbonate 600 mg-vitamin D 400 units (CALTRATE) 600-400 MG-UNIT per tablet Take 2 tablets by mouth 2 times daily., Historical      Continuous Glucose Transmitter (DEXCOM G6 TRANSMITTER) MISC CHANGE EVERY 90 DAYS, Disp-1 each, R-1, E-Prescribe      cyclobenzaprine (FLEXERIL) 10 MG tablet Take 10 mg by mouth 3 times daily as needed for muscle spasms., Historical      DULoxetine (CYMBALTA) 20 MG capsule Take 20 mg by mouth daily, Historical      escitalopram (LEXAPRO) 20 MG tablet Take 20 mg by mouth daily, Historical      famotidine (PEPCID) 40 MG tablet Take 40 mg by mouth 2 times daily., Historical      FIASP 100 UNIT/ML SOLN INJECT 15 UNITS UNDER THE SKIN VIA INSULIN PUMP. MAY USE UP TO 85 UNITS DAILY, Disp-90 mL, R-0, E-Prescribe      gabapentin (NEURONTIN) 300 MG capsule 1 capsule BID.  If still having pain can increase to 1 capsule TID., Disp-90 capsule, R-0, E-Prescribe      Glucagon (GVOKE HYPOPEN 1-PACK) 1 MG/0.2ML SOAJ Inject 1 mg Subcutaneous once as needed (for hypoglycemia with loss of consciousness), Disp-15 mL, R-5, E-Prescribe      hydrOXYzine (ATARAX) 25 MG tablet Take 25 mg by mouth daily as needed., R-0, Historical      Insulin Disposable Pump (OMNIPOD 5 G6 POD, GEN 5,) MISC CHANGE EVERY 3 DAYS, Disp-30 each, R-5, E-Prescribe      insulin glargine (LANTUS PEN) 100 UNIT/ML pen Inject 6 units daily in the event of pump failure, Disp-15 mL, R-0, E-PrescribeIf Lantus is not covered by insurance, may substitute Basaglar or Semglee or other insulin glargine product per insurance preference at same dose and frequency.        INSULIN PUMP - OUTPATIENT Inject subcutaneously continuously. Date Last Updated on chart: 11/2/2024  Omnipod 5, type of insulin: Fiasp  Change site every 3 days  Basal Rates in units/hr  7275-0873: 0.15 unit/hr  1704-6538: 0.35 units/hr  ICF (insulin to carb ratio): 10 (1 unit co vers 10g carbs)  ISF (insulin  sensitivity factor): 100 (1 unit lowers BG by 100mg/dL)  Target B  Active insulin time: 4 hrs, Historical      levothyroxine (SYNTHROID/LEVOTHROID) 125 MCG tablet Take 1 tablet (125 mcg) by mouth daily, Disp-90 tablet, R-3, E-Prescribe      lipase-protease-amylase (VIOKACE) 87052-15679-62145 units TABS tablet Take 6 with meals and 3 with snacks; approximately daily maximum of 20 capsules, HistoricalPLEASE SEND PRIOR AUTHORIZATION REQUESTS TO RN TRANSPLANT COORDINATOR: [phone]      loratadine (CLARITIN) 10 MG tablet Take 10 mg by mouth daily., Historical      Nutritional Supplements (BOOST HIGH PROTEIN) LIQD After above baseline labs are drawn, give: 6 mL/kg to maximum of 360 mL; the beverage is to be consumed within 5 minutes., No Print OutIf on pancreatic enzymes, patient may take home enzymes with Boost beverage.   Patients may take long acting insulin ( Levemir and Lantus).   Patient should NOT cover Boost with Novolog, Humalog, Apidra, or regular insulin.      omeprazole (PRILOSEC) 40 MG DR capsule Take 1 capsule (40 mg) by mouth 2 times daily Take 30-60 minutes before a meal., Disp-180 capsule, R-3, E-Prescribe      ondansetron (ZOFRAN ODT) 4 MG ODT tab Take 1 tablet (4 mg) by mouth every 6 hours as needed for nausea or vomiting., Disp-30 tablet, R-3, E-Prescribe      potassium citrate (UROCIT-K) 10 MEQ (1080 MG) CR tablet Take 10 mEq by mouth daily., Historical      traZODone (DESYREL) 100 MG tablet Take 100 mg by mouth at bedtime., Historical      Continuous Glucose Sensor (DEXCOM G6 SENSOR) MISC CHANGE EVERY 10 DAYS, Disp-3 each, R-5, E-Prescribe           STOP taking these medications                amoxicillin-clavulanate (AUGMENTIN) 875-125 MG tablet Comments:   Reason for Stopping:         fluconazole (DIFLUCAN) 150 MG tablet Comments:   Reason for Stopping:               Allergies   Allergies   Allergen Reactions    Corticosteroids Other (See Comments)     All oral, IV and injectable steroids  are contraindicated (unless in life threatening situations) in Islet Auto transplant recipients. They can cause irreversible loss of islet cell function. Please contact patient's transplant care coordinator, Erlinda Multani RN BSN at [phone]/pager: [phone] and endocrinologist prior to administration.      Povidone Iodine Hives     Causes skin to blister    Nsaids      naprosyn = GI upset    Sulfasalazine Nausea and Nausea and Vomiting     Data   Recent Labs   Lab 11/07/24  1130 11/04/24  0655 11/03/24  0734   WBC 5.7 7.6 10.3   HGB 13.0 10.7* 10.8*   HCT 40.7 34.0* 33.5*   MCV 93 95 93   * 355 332     7-Day Micro Results       Collected Updated Procedure Result Status      11/02/2024 0834 11/08/2024 0946 Anaerobic Bacterial Culture Routine [[id number]]    Tissue from Finger, Left    Preliminary result Component Value   Culture No anaerobic organisms isolated after 5 days  [P]                11/02/2024 0834 11/02/2024 2229 Gram Stain [[id number]]   Tissue from Finger, Left    Final result Component Value   GS Culture See corresponding culture for results   Gram Stain Result No organisms seen   Gram Stain Result 1+ WBC seen            11/02/2024 0834 11/06/2024 1329 Tissue Aerobic Bacterial Culture Routine [[id number]]    (Abnormal)   Tissue from Finger, Left    Final result Component Value   Culture 1+ Gram negative bacilli    Unable to further identify.  Susceptibilities not routinely done, refer to antibiogram to view typical susceptibility profiles    1+ Gram negative bacilli    Unable to further identify.  Susceptibilities not routinely done, refer to antibiogram to view typical susceptibility profiles    1+ Gram negative bacilli    Unable to further identify.  Susceptibilities not routinely done, refer to antibiogram to view typical susceptibility profiles                     Recent Labs   Lab 11/07/24  1130 11/05/24  1305 11/05/24  0859 11/05/24  0208 11/03/24  1234 11/03/24  0734  11/02/24  0911 11/02/24  0627   NA  --   --   --   --   --  140  --  140   POTASSIUM  --   --   --   --   --  4.1  --  4.9   CHLORIDE  --   --   --   --   --  103  --  103   CO2  --   --   --   --   --  28  --  23   ANIONGAP  --   --   --   --   --  9  --  14   GLC  --  110* 107* 115*   < > 155*   < > 92   BUN  --   --   --   --   --  16.2  --  17.6   CR 1.05*  --   --   --   --  0.91  --  0.78   GFRESTIMATED 60*  --   --   --   --  71  --  86   VALARIE  --   --   --   --   --  9.0  --  8.7*   AST 30  --   --   --   --   --   --   --    ALT 26  --   --   --   --   --   --   --     < > = values in this interval not displayed.     Results for orders placed or performed during the hospital encounter of 10/31/24   Fingers XR, 2-3 views, left    Narrative    XR FINGER LEFT G/E 2 VIEWS 10/31/2024 11:02 AM     HISTORY: dog bite of left index finger over PIP joint, r/o foreign  body and fracture    COMPARISON: None.       Impression    IMPRESSION:    There is a 3 mm radiodensity similar to that of bone along the radial  aspect of the index finger proximal phalangeal neck which was not  present on 10/29/2020. This could represent a small small foreign body  given the history, however interval heterotopic ossification could  have similar appearance.    Adjacent soft tissue swelling. No definite acute fracture or  dislocation.    JOE OH MD         SYSTEM ID:  [id number]     *Note: Due to a large number of results and/or encounters for the requested time period, some results have not been displayed. A complete set of results can be found in Results Review.

## 2024-11-11 ENCOUNTER — TRANSFERRED RECORDS (OUTPATIENT)
Dept: HEALTH INFORMATION MANAGEMENT | Facility: CLINIC | Age: 59
End: 2024-11-11
Payer: COMMERCIAL

## 2024-11-12 ENCOUNTER — HOME INFUSION BILLING (OUTPATIENT)
Dept: HOME HEALTH SERVICES | Facility: HOME HEALTH | Age: 59
End: 2024-11-12
Payer: COMMERCIAL

## 2024-11-13 PROCEDURE — S9500 HIT ANTIBIOTIC Q24H DIEM: HCPCS

## 2024-11-13 PROCEDURE — A4223 INFUSION SUPPLIES W/O PUMP: HCPCS

## 2024-11-13 PROCEDURE — A4221 SUPP NON-INSULIN INF CATH/WK: HCPCS

## 2024-11-14 ENCOUNTER — LAB REQUISITION (OUTPATIENT)
Dept: LAB | Facility: CLINIC | Age: 59
End: 2024-11-14
Payer: COMMERCIAL

## 2024-11-14 ENCOUNTER — HOME CARE VISIT (OUTPATIENT)
Dept: HOME HEALTH SERVICES | Facility: HOME HEALTH | Age: 59
End: 2024-11-14
Payer: COMMERCIAL

## 2024-11-14 VITALS
SYSTOLIC BLOOD PRESSURE: 122 MMHG | TEMPERATURE: 97.3 F | HEART RATE: 70 BPM | DIASTOLIC BLOOD PRESSURE: 68 MMHG | OXYGEN SATURATION: 98 % | RESPIRATION RATE: 16 BRPM

## 2024-11-14 DIAGNOSIS — L03.012 CELLULITIS OF LEFT FINGER: ICD-10-CM

## 2024-11-14 LAB
ALT SERPL W P-5'-P-CCNC: 48 U/L (ref 0–50)
AST SERPL W P-5'-P-CCNC: 78 U/L (ref 0–45)
BASOPHILS # BLD AUTO: 0.1 10E3/UL (ref 0–0.2)
BASOPHILS NFR BLD AUTO: 2 %
CREAT SERPL-MCNC: 1.13 MG/DL (ref 0.51–0.95)
CRP SERPL-MCNC: <3 MG/L
EGFRCR SERPLBLD CKD-EPI 2021: 55 ML/MIN/1.73M2
EOSINOPHIL # BLD AUTO: 0.2 10E3/UL (ref 0–0.7)
EOSINOPHIL NFR BLD AUTO: 3 %
ERYTHROCYTE [DISTWIDTH] IN BLOOD BY AUTOMATED COUNT: 13.9 % (ref 10–15)
HCT VFR BLD AUTO: 38.7 % (ref 35–47)
HGB BLD-MCNC: 12.4 G/DL (ref 11.7–15.7)
IMM GRANULOCYTES # BLD: 0 10E3/UL
IMM GRANULOCYTES NFR BLD: 0 %
LYMPHOCYTES # BLD AUTO: 2 10E3/UL (ref 0.8–5.3)
LYMPHOCYTES NFR BLD AUTO: 25 %
MCH RBC QN AUTO: 29.6 PG (ref 26.5–33)
MCHC RBC AUTO-ENTMCNC: 32 G/DL (ref 31.5–36.5)
MCV RBC AUTO: 92 FL (ref 78–100)
MONOCYTES # BLD AUTO: 0.9 10E3/UL (ref 0–1.3)
MONOCYTES NFR BLD AUTO: 11 %
NEUTROPHILS # BLD AUTO: 4.8 10E3/UL (ref 1.6–8.3)
NEUTROPHILS NFR BLD AUTO: 60 %
NRBC # BLD AUTO: 0 10E3/UL
NRBC BLD AUTO-RTO: 0 /100
PLATELET # BLD AUTO: 470 10E3/UL (ref 150–450)
RBC # BLD AUTO: 4.19 10E6/UL (ref 3.8–5.2)
WBC # BLD AUTO: 8.1 10E3/UL (ref 4–11)

## 2024-11-14 PROCEDURE — 84450 TRANSFERASE (AST) (SGOT): CPT | Performed by: INTERNAL MEDICINE

## 2024-11-14 PROCEDURE — 84460 ALANINE AMINO (ALT) (SGPT): CPT | Performed by: INTERNAL MEDICINE

## 2024-11-14 PROCEDURE — 99601 HOME NFS VISIT <2 HRS: CPT

## 2024-11-14 PROCEDURE — S9500 HIT ANTIBIOTIC Q24H DIEM: HCPCS

## 2024-11-14 PROCEDURE — 82565 ASSAY OF CREATININE: CPT | Performed by: INTERNAL MEDICINE

## 2024-11-14 PROCEDURE — 85004 AUTOMATED DIFF WBC COUNT: CPT | Performed by: INTERNAL MEDICINE

## 2024-11-14 PROCEDURE — A4223 INFUSION SUPPLIES W/O PUMP: HCPCS

## 2024-11-14 PROCEDURE — 85014 HEMATOCRIT: CPT | Performed by: INTERNAL MEDICINE

## 2024-11-14 PROCEDURE — 86140 C-REACTIVE PROTEIN: CPT | Performed by: INTERNAL MEDICINE

## 2024-11-14 NOTE — PROGRESS NOTES
Nursing Visit Note:  Nurse visit today for Midline CLC and labs for Lynn Thompson.     present during visit today: Not Applicable.    Labs obtained via  peripheral   [facility] Tracking number: [id number]  Facility sent to: [facility]    Patient had one episode of diarrhea today.  Denies nausea.  Denies pain.  L finger stiff today. Skin pink and healing. Midline dressing had drainage on biopatch.  CLC done. Site WNL.     Next Visit Plan: Plan for midline removal after last dose on 11/18/24 pending provider order.    Shea Herrera RN  [facility]      Shea Herrera RN 11/14/2024

## 2024-11-15 ENCOUNTER — HOME INFUSION BILLING (OUTPATIENT)
Dept: HOME HEALTH SERVICES | Facility: HOME HEALTH | Age: 59
End: 2024-11-15
Payer: COMMERCIAL

## 2024-11-15 PROCEDURE — A4223 INFUSION SUPPLIES W/O PUMP: HCPCS

## 2024-11-15 PROCEDURE — S9500 HIT ANTIBIOTIC Q24H DIEM: HCPCS

## 2024-11-19 ENCOUNTER — EPISODE UPDATE (OUTPATIENT)
Dept: HOME HEALTH SERVICES | Facility: HOME HEALTH | Age: 59
End: 2024-11-19
Payer: COMMERCIAL

## 2024-11-19 ENCOUNTER — LAB REQUISITION (OUTPATIENT)
Dept: LAB | Facility: CLINIC | Age: 59
End: 2024-11-19
Payer: COMMERCIAL

## 2024-11-19 ENCOUNTER — HOME CARE VISIT (OUTPATIENT)
Dept: HOME HEALTH SERVICES | Facility: HOME HEALTH | Age: 59
End: 2024-11-19
Payer: COMMERCIAL

## 2024-11-19 VITALS
OXYGEN SATURATION: 97 % | DIASTOLIC BLOOD PRESSURE: 78 MMHG | SYSTOLIC BLOOD PRESSURE: 124 MMHG | RESPIRATION RATE: 16 BRPM | HEART RATE: 73 BPM | TEMPERATURE: 97.2 F

## 2024-11-19 DIAGNOSIS — L03.012 CELLULITIS OF LEFT FINGER: ICD-10-CM

## 2024-11-19 LAB
ALT SERPL W P-5'-P-CCNC: 51 U/L (ref 0–50)
AST SERPL W P-5'-P-CCNC: 48 U/L (ref 0–45)
BASOPHILS # BLD AUTO: 0.2 10E3/UL (ref 0–0.2)
BASOPHILS NFR BLD AUTO: 2 %
CREAT SERPL-MCNC: 0.97 MG/DL (ref 0.51–0.95)
CRP SERPL-MCNC: <3 MG/L
EGFRCR SERPLBLD CKD-EPI 2021: 66 ML/MIN/1.73M2
EOSINOPHIL # BLD AUTO: 0.2 10E3/UL (ref 0–0.7)
EOSINOPHIL NFR BLD AUTO: 3 %
ERYTHROCYTE [DISTWIDTH] IN BLOOD BY AUTOMATED COUNT: 13.8 % (ref 10–15)
HCT VFR BLD AUTO: 37.8 % (ref 35–47)
HGB BLD-MCNC: 12.1 G/DL (ref 11.7–15.7)
IMM GRANULOCYTES # BLD: 0 10E3/UL
IMM GRANULOCYTES NFR BLD: 0 %
LYMPHOCYTES # BLD AUTO: 2.6 10E3/UL (ref 0.8–5.3)
LYMPHOCYTES NFR BLD AUTO: 33 %
MCH RBC QN AUTO: 29.4 PG (ref 26.5–33)
MCHC RBC AUTO-ENTMCNC: 32 G/DL (ref 31.5–36.5)
MCV RBC AUTO: 92 FL (ref 78–100)
MONOCYTES # BLD AUTO: 0.8 10E3/UL (ref 0–1.3)
MONOCYTES NFR BLD AUTO: 10 %
NEUTROPHILS # BLD AUTO: 4.2 10E3/UL (ref 1.6–8.3)
NEUTROPHILS NFR BLD AUTO: 52 %
NRBC # BLD AUTO: 0 10E3/UL
NRBC BLD AUTO-RTO: 0 /100
PLATELET # BLD AUTO: 426 10E3/UL (ref 150–450)
RBC # BLD AUTO: 4.11 10E6/UL (ref 3.8–5.2)
WBC # BLD AUTO: 8 10E3/UL (ref 4–11)

## 2024-11-19 PROCEDURE — 84450 TRANSFERASE (AST) (SGOT): CPT | Performed by: INTERNAL MEDICINE

## 2024-11-19 PROCEDURE — 86140 C-REACTIVE PROTEIN: CPT | Performed by: INTERNAL MEDICINE

## 2024-11-19 PROCEDURE — 84460 ALANINE AMINO (ALT) (SGPT): CPT | Performed by: INTERNAL MEDICINE

## 2024-11-19 PROCEDURE — 85004 AUTOMATED DIFF WBC COUNT: CPT | Performed by: INTERNAL MEDICINE

## 2024-11-19 PROCEDURE — 82565 ASSAY OF CREATININE: CPT | Performed by: INTERNAL MEDICINE

## 2024-11-19 NOTE — PROGRESS NOTES
Nursing Visit Note:  Nurse visit today for SNV for midline removal and labs for Lynn Thompson.     present during visit today: Not Applicable.    Lab-Only Nursing Note    Labs obtained via VPT    [id number]    Facility sent to: [facility]    Next Visit Plan:   Discharge from [facility], therapy completed     Nurys Fishman RN 11/19/2024   and Midline removed from left arm 15cm and tip intact.  Bacitracin, gauze and tegaderm applied over site.  Instructed pt to leave dressing on for 24 hours them remove and place a bandaid over site until well healed.   Patient states she is doing well. Some loose stools and fatigue but otherwise tolerated antibiotic well.  will begin oral abx tomorrow as instructed per MD.  left index finger incision well healed.  ROM improving with therapy.        Nurys Fishman RN 11/19/2024

## 2024-11-21 ENCOUNTER — OFFICE VISIT (OUTPATIENT)
Dept: TRANSPLANT | Facility: CLINIC | Age: 59
End: 2024-11-21
Attending: PEDIATRICS
Payer: MEDICARE

## 2024-11-21 ENCOUNTER — LAB (OUTPATIENT)
Dept: LAB | Facility: CLINIC | Age: 59
End: 2024-11-21
Payer: COMMERCIAL

## 2024-11-21 VITALS
SYSTOLIC BLOOD PRESSURE: 131 MMHG | WEIGHT: 139.99 LBS | DIASTOLIC BLOOD PRESSURE: 77 MMHG | OXYGEN SATURATION: 97 % | HEART RATE: 67 BPM | BODY MASS INDEX: 24.03 KG/M2

## 2024-11-21 VITALS — WEIGHT: 139.99 LBS | BODY MASS INDEX: 24.03 KG/M2

## 2024-11-21 DIAGNOSIS — E89.1 POST-PANCREATECTOMY DIABETES (H): Primary | ICD-10-CM

## 2024-11-21 DIAGNOSIS — E13.9 POST-PANCREATECTOMY DIABETES (H): ICD-10-CM

## 2024-11-21 DIAGNOSIS — Z78.0 ASYMPTOMATIC MENOPAUSAL STATE: ICD-10-CM

## 2024-11-21 DIAGNOSIS — Z90.410 ACQUIRED ABSENCE OF PANCREAS: ICD-10-CM

## 2024-11-21 DIAGNOSIS — E13.9 POST-PANCREATECTOMY DIABETES (H): Primary | ICD-10-CM

## 2024-11-21 DIAGNOSIS — Z98.84 GASTRIC BYPASS STATUS FOR OBESITY: Primary | ICD-10-CM

## 2024-11-21 DIAGNOSIS — E03.9 ACQUIRED HYPOTHYROIDISM: ICD-10-CM

## 2024-11-21 DIAGNOSIS — K86.89 PANCREATIC INSUFFICIENCY: Primary | ICD-10-CM

## 2024-11-21 DIAGNOSIS — Z90.410 POST-PANCREATECTOMY DIABETES (H): ICD-10-CM

## 2024-11-21 DIAGNOSIS — Z90.410 POST-PANCREATECTOMY DIABETES (H): Primary | ICD-10-CM

## 2024-11-21 DIAGNOSIS — K86.81 EXOCRINE PANCREATIC INSUFFICIENCY: ICD-10-CM

## 2024-11-21 DIAGNOSIS — K86.89 PANCREATIC INSUFFICIENCY: ICD-10-CM

## 2024-11-21 DIAGNOSIS — K90.9 INTESTINAL MALABSORPTION, UNSPECIFIED: ICD-10-CM

## 2024-11-21 DIAGNOSIS — E89.1 POST-PANCREATECTOMY DIABETES (H): ICD-10-CM

## 2024-11-21 LAB
ALBUMIN SERPL BCG-MCNC: 4.1 G/DL (ref 3.5–5.2)
ALP SERPL-CCNC: 110 U/L (ref 40–150)
ALT SERPL W P-5'-P-CCNC: 42 U/L (ref 0–50)
ANION GAP SERPL CALCULATED.3IONS-SCNC: 10 MMOL/L (ref 7–15)
AST SERPL W P-5'-P-CCNC: 43 U/L (ref 0–45)
BASOPHILS # BLD AUTO: 0.1 10E3/UL (ref 0–0.2)
BASOPHILS NFR BLD AUTO: 2 %
BILIRUB SERPL-MCNC: 0.5 MG/DL
BUN SERPL-MCNC: 15.2 MG/DL (ref 8–23)
CALCIUM SERPL-MCNC: 9.6 MG/DL (ref 8.8–10.4)
CHLORIDE SERPL-SCNC: 107 MMOL/L (ref 98–107)
CHOLEST SERPL-MCNC: 205 MG/DL
CREAT SERPL-MCNC: 1.07 MG/DL (ref 0.51–0.95)
EGFRCR SERPLBLD CKD-EPI 2021: 59 ML/MIN/1.73M2
EOSINOPHIL # BLD AUTO: 0.3 10E3/UL (ref 0–0.7)
EOSINOPHIL NFR BLD AUTO: 4 %
ERYTHROCYTE [DISTWIDTH] IN BLOOD BY AUTOMATED COUNT: 13.8 % (ref 10–15)
EST. AVERAGE GLUCOSE BLD GHB EST-MCNC: 148 MG/DL
FASTING STATUS PATIENT QL REPORTED: ABNORMAL
FASTING STATUS PATIENT QL REPORTED: YES
FERRITIN SERPL-MCNC: 177 NG/ML (ref 11–328)
GLUCOSE SERPL-MCNC: 137 MG/DL (ref 70–99)
GLUCOSE SERPL-MCNC: 249 MG/DL (ref 70–99)
GLUCOSE SERPL-MCNC: 335 MG/DL (ref 70–99)
HBA1C MFR BLD: 6.8 %
HCO3 SERPL-SCNC: 27 MMOL/L (ref 22–29)
HCT VFR BLD AUTO: 38 % (ref 35–47)
HDLC SERPL-MCNC: 69 MG/DL
HGB BLD-MCNC: 12.4 G/DL (ref 11.7–15.7)
IMM GRANULOCYTES # BLD: 0 10E3/UL
IMM GRANULOCYTES NFR BLD: 0 %
IRON BINDING CAPACITY (ROCHE): 272 UG/DL (ref 240–430)
IRON SATN MFR SERPL: 31 % (ref 15–46)
IRON SERPL-MCNC: 84 UG/DL (ref 37–145)
LDLC SERPL CALC-MCNC: 101 MG/DL
LYMPHOCYTES # BLD AUTO: 2.1 10E3/UL (ref 0.8–5.3)
LYMPHOCYTES NFR BLD AUTO: 29 %
MCH RBC QN AUTO: 29.8 PG (ref 26.5–33)
MCHC RBC AUTO-ENTMCNC: 32.6 G/DL (ref 31.5–36.5)
MCV RBC AUTO: 91 FL (ref 78–100)
MONOCYTES # BLD AUTO: 0.7 10E3/UL (ref 0–1.3)
MONOCYTES NFR BLD AUTO: 10 %
NEUTROPHILS # BLD AUTO: 4 10E3/UL (ref 1.6–8.3)
NEUTROPHILS NFR BLD AUTO: 56 %
NONHDLC SERPL-MCNC: 136 MG/DL
NRBC # BLD AUTO: 0 10E3/UL
NRBC BLD AUTO-RTO: 0 /100
PLATELET # BLD AUTO: 417 10E3/UL (ref 150–450)
POTASSIUM SERPL-SCNC: 3.8 MMOL/L (ref 3.4–5.3)
PREALB SERPL-MCNC: 19.5 MG/DL (ref 20–40)
PROT SERPL-MCNC: 7 G/DL (ref 6.4–8.3)
RBC # BLD AUTO: 4.16 10E6/UL (ref 3.8–5.2)
SODIUM SERPL-SCNC: 144 MMOL/L (ref 135–145)
T4 FREE SERPL-MCNC: 1.65 NG/DL (ref 0.9–1.7)
TRIGL SERPL-MCNC: 175 MG/DL
TSH SERPL DL<=0.005 MIU/L-ACNC: 0.25 UIU/ML (ref 0.3–4.2)
VIT B12 SERPL-MCNC: 374 PG/ML (ref 232–1245)
WBC # BLD AUTO: 7.2 10E3/UL (ref 4–11)

## 2024-11-21 PROCEDURE — 82728 ASSAY OF FERRITIN: CPT | Performed by: PATHOLOGY

## 2024-11-21 PROCEDURE — 82306 VITAMIN D 25 HYDROXY: CPT | Performed by: PEDIATRICS

## 2024-11-21 PROCEDURE — 84630 ASSAY OF ZINC: CPT | Mod: 90 | Performed by: PATHOLOGY

## 2024-11-21 PROCEDURE — 85025 COMPLETE CBC W/AUTO DIFF WBC: CPT | Performed by: PATHOLOGY

## 2024-11-21 PROCEDURE — 36415 COLL VENOUS BLD VENIPUNCTURE: CPT | Mod: 90 | Performed by: PATHOLOGY

## 2024-11-21 PROCEDURE — 82725 ASSAY OF BLOOD FATTY ACIDS: CPT | Mod: 90 | Performed by: PATHOLOGY

## 2024-11-21 PROCEDURE — 84134 ASSAY OF PREALBUMIN: CPT | Performed by: PEDIATRICS

## 2024-11-21 PROCEDURE — G0463 HOSPITAL OUTPT CLINIC VISIT: HCPCS | Performed by: PEDIATRICS

## 2024-11-21 PROCEDURE — 84443 ASSAY THYROID STIM HORMONE: CPT | Performed by: PEDIATRICS

## 2024-11-21 PROCEDURE — 84446 ASSAY OF VITAMIN E: CPT | Mod: 90 | Performed by: PATHOLOGY

## 2024-11-21 PROCEDURE — 83036 HEMOGLOBIN GLYCOSYLATED A1C: CPT | Performed by: PEDIATRICS

## 2024-11-21 PROCEDURE — 80053 COMPREHEN METABOLIC PANEL: CPT | Performed by: PATHOLOGY

## 2024-11-21 PROCEDURE — 83550 IRON BINDING TEST: CPT | Performed by: PATHOLOGY

## 2024-11-21 PROCEDURE — 84590 ASSAY OF VITAMIN A: CPT | Mod: 90 | Performed by: PATHOLOGY

## 2024-11-21 PROCEDURE — 82607 VITAMIN B-12: CPT | Performed by: PEDIATRICS

## 2024-11-21 PROCEDURE — 83540 ASSAY OF IRON: CPT | Performed by: PATHOLOGY

## 2024-11-21 PROCEDURE — 84681 ASSAY OF C-PEPTIDE: CPT | Performed by: PEDIATRICS

## 2024-11-21 PROCEDURE — 80061 LIPID PANEL: CPT | Performed by: PATHOLOGY

## 2024-11-21 PROCEDURE — 99000 SPECIMEN HANDLING OFFICE-LAB: CPT | Performed by: PATHOLOGY

## 2024-11-21 PROCEDURE — 84439 ASSAY OF FREE THYROXINE: CPT | Performed by: PEDIATRICS

## 2024-11-21 RX ORDER — INSULIN ASPART INJECTION 100 [IU]/ML
85 INJECTION, SOLUTION SUBCUTANEOUS DAILY
Qty: 90 ML | Refills: 3 | Status: SHIPPED | OUTPATIENT
Start: 2024-11-21

## 2024-11-21 RX ORDER — GLUCAGON INJECTION, SOLUTION 1 MG/.2ML
1 INJECTION, SOLUTION SUBCUTANEOUS
Qty: 15 ML | Refills: 5 | Status: SHIPPED | OUTPATIENT
Start: 2024-11-21

## 2024-11-21 NOTE — NURSING NOTE
Administered Boost    FBS: 137    Weight (kg): 63.5kg    Called lab with boost time : yes    Lab times: 11:58am, 12:58pm    Patient is aware and given time to return to lab: yes    Jasmin Vega CMA   11/21/2024 11:14 AM

## 2024-11-21 NOTE — PATIENT INSTRUCTIONS
1)  Pump settings:  Suggest change I:C ratio to 8 grams, new settings:  Basal 12a 0.15, 8a 0.35 (in auto mode)  Bolus  I:C 1 per 8g   mg/dL  Target 110 (150) mg/dL    2)  Refills:  Fiasp, Glucagon (Gvoke) sent.  Also sent Lantus 'to have on hand' at pharmacy for Rx.     3)  For changing to OP5 jade: From the OP5/Insulet website:  -- Download the Omnipod 5 Jade from the Jade Store  -- Log in using your Omnipod ID and password  -- Transfer your current Omnipod 5 settings during First Time Setup.  -- Start a new Pod  For resources and steps to get started you can visit [employer]  One caveat is that you will 'start over' when you switch to the jade, so it may take 1-2 weeks for your pump to relearn you with the change.      4)  Will watch vitamin levels (on Over the counter vitamin). Agree with neurontin for the possible neuropathy pain.     Follow up in March (in Epic).  Also would like to get DXA then.

## 2024-11-21 NOTE — LETTER
11/21/2024      Lynn Thompson  [address] [facility] [id number]      Dear Colleague,    Thank you for referring your patient, Lynn Thompson, to the [facility]. Please see a copy of my visit note below.      [facility]  Islet Autotransplant, Diabetes Follow Up    Problem List:  Patient Active Problem List   Diagnosis     Iron deficiency anemia secondary to inadequate dietary iron intake     Gastric bypass status for obesity     Chronic pain syndrome     Exocrine pancreatic insufficiency     Asplenia     BLU (obstructive sleep apnea)     H/O of rectopexy     Dysthymia     Anxiety     Thyroid nodule     Acquired hypothyroidism     Post-pancreatectomy diabetes (H)     Other iron deficiency anemia     Urge incontinence     Urinary urgency     High-tone pelvic floor dysfunction     Pyuria     Recurrent kidney stones     Vaginal atrophy     Enteric hyperoxaluria     Hypocitraturia     Acute sepsis (H)     S/P partial hysterectomy     Generalized abdominal pain     Diarrhea, unspecified type     Infectious tenosynovitis     Cellulitis of left index finger     Open wound of left index finger due to dog bite         Assessment:  1.  Post pancreatectomy diabetes mellitus, s/p total pancreatectomy and islet autotransplant.  (ICD-10 E89.1)  2.  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  Rapid gastric emptying-- must use Viokace as she does not respond to enteric coated enzymes  4.  Concern for recurring cholangitis (keep on list for FMT options when available)    Lynn is a 59 year old with history of gastric bypass, chronic pancreatitis who is s/p total pancreatectomy and islet autotransplant.  She was insulin independent until 6/6/2017 (just after 4 years post-tx) and now has partial islet function.    She is on Fiasp due to rapid gastric emptying and post meal hypoglycemia.  She is on  Viokace because she only responds to non enteric coated enzymes and otherwise has severe malabsorption.  Other pertinent TPIAT hx includes past febrile cholangitis and other endocrine hx includes acquired hypothyroidism on levothyroxine.    Today she is doing overall well for diabetes management, but with post meal hyperglycemia so recommending changes below.  She needs to remain on Viokace due to her anatomy and EPI post pancreatectomy and we have vitamin levels pending and will adjust vitamin supplementation as needed.         Plan:  1.  Changes to current diabetes regimen:  Patient Instructions   1)  Pump settings:  Suggest change I:C ratio to 8 grams, new settings:  Basal 12a 0.15, 8a 0.35 (in auto mode)  Bolus  I:C 1 per 8g   mg/dL  Target 110 (150) mg/dL    2)  Refills:  Fiasp, Glucagon (Gvoke) sent.  Also sent Lantus 'to have on hand' at pharmacy for Rx.     3)  For changing to OP5 jade: From the OP5/Insulet website:  -- Download the Omnipod 5 Jade from the Jade Store  -- Log in using your Omnipod ID and password  -- Transfer your current Omnipod 5 settings during First Time Setup.  -- Start a new Pod  For resources and steps to get started you can visit [employer]  One caveat is that you will 'start over' when you switch to the jade, so it may take 1-2 weeks for your pump to relearn you with the change.      4)  Will watch vitamin levels (on Over the counter vitamin). Agree with neurontin for the possible neuropathy pain.     Follow up in March (in Epic).  Also would like to get DXA then.          2.  Frequency of blood sugar checks:  premeal and bedtime, and as needed for hypoglycemia (6 strip per day).    3.  Continue routine follow up for autoislet transplant patients:  Mixed meal test (6 mL/kg BoostHP to max of 360 mL) at 3 months, 6 months, and once a year post transplant.  Hemoglobin A1c levels at these time points and quarterly.    4.  Other issues addressed today:  As above    Follow  up:  As above        Contact me for questions at [phone] or [phone].  Emergency number to reach pediatric endocrinology after hours is [phone].        Dr. Adriana Robles MD  [facility]  Director of Research, Islet Auto-transplant Program  Phone:  [phone]  Electronically signed: November 21, 2024 at 2:24 PM          Review of the result(s) of each unique test - CGM, pump  Prescription drug management  40 minutes spent by me on the date of the encounter doing chart review, history and exam, documentation and further activities per the note   The longitudinal plan of care for the diagnosis(es)/condition(s) as documented were addressed during this visit. Due to the added complexity in care, I will continue to support Lynn in the subsequent management and with ongoing continuity of care.          HPI:  Lynn is a 59 year old female here for follow up o total pancreatectomy, islet cell autotransplant, splenectomy, liver biopsy (needle core), choledochoduodenostomy, feeding jejunostomy performed on 5/10/2013.  At the time of the procedure, the patient received 178,100 IEQ, or 3,209 IEQ/kg body weight. She has had two subsequent exploratory laparatomy for small bowel obstruction. Other notable endocrine history includes a complex cystic nodule in mid right thyroid lobe with 1 cm maximum diameter (saw Dr Adorno in 11/2016) and mild hypothyroidism followed by PCP, pathology in 2018 by FNA showed a benign nodule (includes adenomatoid nodule, colloid nodule, etc.).  She had an episode of cholangitis and was hospitalized for 4 days 8/24/17 (fevers, RUQ pain, + Ecoli blood cx).  She had another similar episode in March 20204.     She was on NO insulin at her 1 year, 2 year, 3 year, 4 year transplant anniversaries and restart insulin just after 4 years post-tx, insulin restart date of  6/6/2017.   She had a slow opioid wean off suboxone but  eventually did come off.     Other history notable for surgical procedure Admitted from 2/1- 2/5/22 for  1. Posterior suture rectopexy (Colorectal primary)  2. Sigmoidoscopy (Colorectal primary)  3. Supracervical hysterectomy with bilateral salpingooophrectomy (Uro)  4. Uterosacral ligament suspension (Uro)  5. Gan colposuspension (Uro)  6. Cystoscopy (Uro)      Interval history:  - New :  Hospitalized 11/1-11/5 at [facility] for debribement and IV abx for infected dog bite with tenosynovitis and cellulitis on hand.  - Pump report overall looks great but noted persistent hyperglycemia post meal despite routine use of meal bolusing.  Did make some adjustments with hospital nurse to ISF.  - Neuropathy ? In feet- has burning, pain, marck at night     1)  Diabetes data:  Lynn continues to have partial islet graft function.   She is on Fiasp right before meal time and has rapid gastric emptying.    Pump, settings below.    Basal Rates:  12a 0.15 unit(s)/hr  8a 0.35 units/hr      Target Glucose  12a 110 (150)      Sensitivity   12a 100 mg/dL     Insulin to Carb Ratio  12 am 8 grams     Active insulin time 3 hours  Reverse correction off    Total daily insulin dose= 13.1 units of Fiasp, of which 5.3 units per day is basal on pump, and reaminder is bolus.               HbA1c levels:  Lab Results   Component Value Date    A1C 6.8 11/21/2024    A1C 6.6 08/23/2024    A1C 6.7 03/03/2024    A1C 7.1 09/07/2023    A1C 6.6 04/03/2023    A1C 6.8 05/10/2021    A1C 6.9 02/16/2021    A1C 6.7 08/20/2020    A1C 7.1 06/05/2020    A1C 6.9 02/06/2020       Hypoglycemia history:   Recent history as above.  The patient has had NO episodes of severe hypoglycemia (seizure, coma, or neuroglycopenic symptoms severe enough to require assistance from another person).  Blood sugars were reviewed from the patient records and/or the meter download.        Patient is good candidate for CGM therapy.  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   --  Require frequent adjustments of the insulin treatment regimen, based on therapeutic CGM test results      2)  Nutrition./PERT: Needs Viokace (due to gastric anatomy, rapid emptying).      3)  Thyroid:  On levothyroxine - recent labs below - now at 125 mcg daily.   TSH   Date Value Ref Range Status   11/21/2024 0.25 (L) 0.30 - 4.20 uIU/mL Final   08/17/2021 2.75 0.40 - 4.00 mU/L Final   03/15/2021 2.93 0.40 - 4.00 mU/L Final     T4 Free   Date Value Ref Range Status   06/05/2020 1.05 0.76 - 1.46 ng/dL Final     Free T4   Date Value Ref Range Status   11/21/2024 1.65 0.90 - 1.70 ng/dL Final     Review of systems:  A complete Review Of Systems was performed but was negative except as noted in the HPI.    Past Medical and Surgical History:  Past Medical History:   Diagnosis Date     Benign paroxysmal positional vertigo     occ.      Calculus of kidney 05/2005    x1 on L side passed, several stones.  Has been tested for oxalate.     Chronic abdominal pain 07/17/2013     Chronic pancreatitis 07/17/2013     Depression     also occ panic spells     Diabetes (H) 5/10/2013    Total Pancreatomy with Auto Islets Transplant     Dyspepsia 06/1999    H. pylori   treated     Dysthymia 03/01/2016     Headaches     still periodic HA's ;  often 5X/week     Hypertension 02/22/2016    Stress related     Iron deficiency anemia 11/2003    relates to gastric bypass     Post-pancreatectomy diabetes melltius 05/17/2013     Sleep apnea     uses splint     Spasm of sphincter of Oddi     surgical + endoscopic stenting of pancreatic duct [place] [facility] [place]     Thyroid nodule 2016     Past Surgical History:   Procedure Laterality Date     ABDOMINOPLASTY  2002    Tummy tuck     APPENDECTOMY  1990     BUNIONECTOMY Right 1998     CBD Stent placement  2002    CBD stent; Dr. Presley      SECTION       CHOLECYSTECTOMY       COLONOSCOPY N/A 2021    Procedure: COLONOSCOPY INCOMPLETE Aborted due to incomplete prep  will need to take additional prep and return tomorrow 21;  Surgeon: Ihsan Saenz MD;  Location: [facility]     COMBINED CYSTOSCOPY, RETROGRADES, URETEROSCOPY, LASER HOLMIUM LITHOTRIPSY URETER(S), INSERT STENT Right 2015    Procedure: COMBINED CYSTOSCOPY, RETROGRADES, URETEROSCOPY, LASER HOLMIUM LITHOTRIPSY URETER(S), INSERT STENT;  Surgeon: Kennedi Aldana MD;  Location: [facility]     COMBINED CYSTOSCOPY, RETROGRADES, URETEROSCOPY, LASER HOLMIUM LITHOTRIPSY URETER(S), INSERT STENT Right 2015    Procedure: COMBINED CYSTOSCOPY, RETROGRADES, URETEROSCOPY, LASER HOLMIUM LITHOTRIPSY URETER(S), INSERT STENT;  Surgeon: Kennedi Aldana MD;  [facility]: [facility]     COSMETIC SURGERY  2002    Tummy tuck     CYSTECTOMY OVARIAN BENIGN Right      CYSTOSCOPY, RETROGRADES, INSERT STENT URETER(S), COMBINED  10/02/2012    Procedure: COMBINED CYSTOSCOPY, RETROGRADES, INSERT STENT URETER(S);  COMBINED CYSTOSCOPY,  , INSERT LEFT STENT URETER;  Surgeon: Johny Baez MD;  Location: [facility]     CYSTOSCOPY, RETROGRADES, INSERT STENT URETER(S), COMBINED Left 2022    Procedure: Cystoscopy with left retrograde pyelogram, left ureteroscopy, thulium laser lithotripsy of left ureteral calculus, stone basketing and left ureteral stent insertion;  Surgeon: Alonzo Rome MD;  Location: [facility]     ESOPHAGOSCOPY, GASTROSCOPY, DUODENOSCOPY (EGD), COMBINED N/A 2018    Procedure: COMBINED ESOPHAGOSCOPY, GASTROSCOPY, DUODENOSCOPY (EGD);  ESOPHAGOSCOPY, GASTROSCOPY, DUODENOSCOPY (EGD)    ;  Surgeon: Tamir Rodgers  MD;  Location: [facility]     EXTRACORPOREAL SHOCK WAVE LITHOTRIPSY (ESWL)  10/16/2012    Procedure: EXTRACORPOREAL SHOCK WAVE LITHOTRIPSY (ESWL);  left EXTRACORPOREAL SHOCK WAVE LITHOTRIPSY (ESWL) ;  Surgeon: Johny Baez MD;  Location: [facility]     Gastric bypass NOS       HERNIA REPAIR  02/2015     HYSTERECTOMY SUPRACERVICAL, BILATERAL SALPINGO-OOPHORECTOMY, COMBINED N/A 02/01/2022    Procedure: Abdominal supracervical hysterectomy, bilateral salpingooophrectomy;  Surgeon: Nicole Rivera MD;  [facility]     INCISION AND DRAINAGE FINGER, COMBINED Left 11/2/2024    Procedure: Left index finger incision and debridement to the level of bone, dorsally, 2cm2. Left index finger proximal interphalangial joint irrigation. Left index finger incision and debridement to the level of the flexor tendon sheath, 3 cm2.;  Surgeon: Aileen Reza MD;  Location: [facility]     IRRIGATION AND DEBRIDEMENT HAND, COMBINED Left 10/30/2020    Procedure: Left hand sharp excisional debridement of skin, subcutaneous tissue and fat with a scalpel, 2 x 1 x 1 cm.;  Surgeon: Demian Renteria MD;  Location: [facility]     LAPAROSCOPIC LYSIS ADHESIONS N/A 02/20/2015    Procedure: LAPAROSCOPIC LYSIS ADHESIONS;  Surgeon: Aaron Early MD;  [facility]     LAPAROSCOPIC LYSIS ADHESIONS N/A 12/29/2015    Procedure: LAPAROSCOPIC LYSIS ADHESIONS;  Surgeon: Aaron Early MD;  [facility]     PANCREATECTOMY, TRANSPLANT AUTO ISLET CELL, COMBINED  05/10/2013    Procedure: COMBINED PANCREATECTOMY, TRANSPLANT AUTO ISLET CELL;  Pancreatectomy, Auto Islet Cell Transplant   hernia repair, jejunostomy tube and liver biopsies with Anesthesia General with block;  Surgeon: Aaron Early MD;  [facility]     Partial ileum resection  1992     RECTOPEXY ABDOMINAL N/A 02/01/2022    Procedure: RECTOPEXY, ABDOMINAL;  Surgeon: Uriah Sheridan MD;  [facility]     RELEASE TRIGGER FINGER Left 11/2/2024    Procedure: Left index finger  A1 pulley release.;  Surgeon: Aileen Reza MD;  Location: [facility]     REPAIR PTOSIS BROW BILATERAL Bilateral 2020    Procedure: BILATERAL BROW PTOSIS REPAIR;  Surgeon: Denise Alberts MD;  Location: [facility]     SACROCOLPOPEXY, CYSTOSCOPY, COMBINED N/A 2022    Procedure: Uterosacral ligament suspension, pina colposuspension with Cystoscopy;  Surgeon: Nicole Rivera MD;  [facility]     SIGMOIDOSCOPY FLEXIBLE N/A 2022    Procedure: SIGMOIDOSCOPY, FLEXIBLE;  Surgeon: Uriah Sheridan MD;  [facility]     Surgery for SBO       TONSILLECTOMY, ADENOIDECTOMY, COMBINED  1997     TRANSPLANT  5/10/13    Pancreatic Auto-Islet Transplant       Family History:  New changes since last visit:  none  Family History   Problem Relation Age of Onset     Family History Negative Mother      Respiratory Father         COPD;  at 69     Genitourinary Problems Father         kidney stones     Substance Abuse Father      Depression Father      Asthma Father      Heart Disease Paternal Grandfather         M.I.     Coronary Artery Disease Paternal Grandfather      Hyperlipidemia Paternal Grandfather      Genitourinary Problems Brother         multiple brothers with kidney stones     Gastrointestinal Disease Maternal Grandmother         undiagnosed 'gut' issues     Coronary Artery Disease Maternal Grandfather      Hyperlipidemia Maternal Grandfather      Cerebrovascular Disease Paternal Grandmother         At the age of 103     Anxiety Disorder Paternal Grandmother      Osteoporosis Paternal Grandmother      Anxiety Disorder Son      Anxiety Disorder Daughter      Asthma Daughter      Colon Cancer No family hx of        Social History:  Social History     Social History Narrative     Not on file     Does not work currently.  Mom to many foster dogs     Physical Exam:  Vitals: /77   Pulse 67   Wt 63.5 kg (139 lb 15.9 oz)   LMP 2013   SpO2 97%   BMI 24.03 kg/m     BMI= Body mass index is 24.03 kg/m .  General:  Appearance is normal, no acute distress  HEENT:  NC/AT, sclera appear white  Neck:  No thyroid nodule palpable.   Neck:  No obvious thyromegaly  CV/Lungs:  Non distressed breathing  Skin:  No apparent rashes.   Neuro:  Normal mental status  Psych:  Normal affect    Results.  Mixed Meal Test:  C Peptide   Date Value Ref Range Status   09/07/2023 2.2 0.9 - 6.9 ng/mL Final   09/07/2023 1.8 0.9 - 6.9 ng/mL Final   09/15/2022 1.6 0.9 - 6.9 ng/mL Final   09/15/2022 1.2 0.9 - 6.9 ng/mL Final   09/15/2022 0.3 (L) 0.9 - 6.9 ng/mL Final   08/20/2020 3.4 0.9 - 6.9 ng/mL Final   08/20/2020 1.5 0.9 - 6.9 ng/mL Final   08/20/2020 0.5 (L) 0.9 - 6.9 ng/mL Final   05/16/2019 1.8 0.9 - 6.9 ng/mL Final   05/16/2019 1.5 0.9 - 6.9 ng/mL Final     Glucose   Date Value Ref Range Status   11/21/2024 249 (H) 70 - 99 mg/dL Final   11/21/2024 335 (H) 70 - 99 mg/dL Final   11/21/2024 137 (H) 70 - 99 mg/dL Final   06/14/2022 104 (H) 70 - 99 mg/dL Final   04/16/2022 241 (H) 70 - 99 mg/dL Final   02/01/2022 90 70 - 99 mg/dL Final   01/18/2022 123 (H) 70 - 99 mg/dL Final   11/26/2021 139 (H) 70 - 99 mg/dL Final   05/10/2021 169 (H) 70 - 99 mg/dL Final   03/01/2021 141 (H) 70 - 99 mg/dL Final   02/28/2021 120 (H) 70 - 99 mg/dL Final   02/16/2021 106 (H) 70 - 99 mg/dL Final   10/30/2020 126 (H) 70 - 99 mg/dL Final     GLUCOSE BY METER POCT   Date Value Ref Range Status   11/05/2024 110 (H) 70 - 99 mg/dL Final   11/05/2024 107 (H) 70 - 99 mg/dL Final   11/05/2024 115 (H) 70 - 99 mg/dL Final     Comment:     /RN Notified   11/04/2024 121 (H) 70 - 99 mg/dL Final   11/04/2024 117 (H) 70 - 99 mg/dL Final       Lab on 11/21/2024   Component Date Value Ref Range Status     Estimated Average Glucose 11/21/2024 148 (H)  <117 mg/dL Final     Hemoglobin A1C 11/21/2024 6.8 (H)  <5.7 % Final    Normal <5.7%   Prediabetes 5.7-6.4%    Diabetes 6.5% or higher     Note: Adopted from ADA consensus guidelines.      Sodium 11/21/2024 144  135 - 145 mmol/L Final     Potassium 11/21/2024 3.8  3.4 - 5.3 mmol/L Final     Carbon Dioxide (CO2) 11/21/2024 27  22 - 29 mmol/L Final     Anion Gap 11/21/2024 10  7 - 15 mmol/L Final     Urea Nitrogen 11/21/2024 15.2  8.0 - 23.0 mg/dL Final     Creatinine 11/21/2024 1.07 (H)  0.51 - 0.95 mg/dL Final     GFR Estimate 11/21/2024 59 (L)  >60 mL/min/1.73m2 Final    eGFR calculated using 2021 CKD-EPI equation.     Calcium 11/21/2024 9.6  8.8 - 10.4 mg/dL Final    Reference intervals for this test were updated on 7/16/2024 to reflect our healthy population more accurately. There may be differences in the flagging of prior results with similar values performed with this method. Those prior results can be interpreted in the context of the updated reference intervals.     Chloride 11/21/2024 107  98 - 107 mmol/L Final     Glucose 11/21/2024 137 (H)  70 - 99 mg/dL Final     Alkaline Phosphatase 11/21/2024 110  40 - 150 U/L Final     AST 11/21/2024 43  0 - 45 U/L Final     ALT 11/21/2024 42  0 - 50 U/L Final     Protein Total 11/21/2024 7.0  6.4 - 8.3 g/dL Final     Albumin 11/21/2024 4.1  3.5 - 5.2 g/dL Final     Bilirubin Total 11/21/2024 0.5  <=1.2 mg/dL Final     Patient Fasting > 8hrs? 11/21/2024 Unknown   Final     Iron 11/21/2024 84  37 - 145 ug/dL Final     Iron Binding Capacity 11/21/2024 272  240 - 430 ug/dL Final     Iron Sat Index 11/21/2024 31  15 - 46 % Final     Ferritin 11/21/2024 177  11 - 328 ng/mL Final     Cholesterol 11/21/2024 205 (H)  <200 mg/dL Final     Triglycerides 11/21/2024 175 (H)  <150 mg/dL Final     Direct Measure HDL 11/21/2024 69  >=50 mg/dL Final     LDL Cholesterol Calculated 11/21/2024 101 (H)  <100 mg/dL Final     Non HDL Cholesterol 11/21/2024 136 (H)  <130 mg/dL Final     Patient Fasting > 8hrs? 11/21/2024 Unknown   Final     WBC Count 11/21/2024 7.2  4.0 - 11.0 10e3/uL Final     RBC Count 11/21/2024 4.16  3.80 - 5.20 10e6/uL Final     Hemoglobin 11/21/2024  12.4  11.7 - 15.7 g/dL Final     Hematocrit 11/21/2024 38.0  35.0 - 47.0 % Final     MCV 11/21/2024 91  78 - 100 fL Final     MCH 11/21/2024 29.8  26.5 - 33.0 pg Final     MCHC 11/21/2024 32.6  31.5 - 36.5 g/dL Final     RDW 11/21/2024 13.8  10.0 - 15.0 % Final     Platelet Count 11/21/2024 417  150 - 450 10e3/uL Final     % Neutrophils 11/21/2024 56  % Final     % Lymphocytes 11/21/2024 29  % Final     % Monocytes 11/21/2024 10  % Final     % Eosinophils 11/21/2024 4  % Final     % Basophils 11/21/2024 2  % Final     % Immature Granulocytes 11/21/2024 0  % Final     NRBCs per 100 WBC 11/21/2024 0  <1 /100 Final     Absolute Neutrophils 11/21/2024 4.0  1.6 - 8.3 10e3/uL Final     Absolute Lymphocytes 11/21/2024 2.1  0.8 - 5.3 10e3/uL Final     Absolute Monocytes 11/21/2024 0.7  0.0 - 1.3 10e3/uL Final     Absolute Eosinophils 11/21/2024 0.3  0.0 - 0.7 10e3/uL Final     Absolute Basophils 11/21/2024 0.1  0.0 - 0.2 10e3/uL Final     Absolute Immature Granulocytes 11/21/2024 0.0  <=0.4 10e3/uL Final     Absolute NRBCs 11/21/2024 0.0  10e3/uL Final     Glucose 11/21/2024 335 (H)  70 - 99 mg/dL Final     Patient Fasting > 8hrs? 11/21/2024 Yes   Final     Again, thank you for allowing me to participate in the care of your patient.        Sincerely,        Adriana Robles MD

## 2024-11-21 NOTE — PROGRESS NOTES
[facility]  Islet Autotransplant, Diabetes Follow Up    Problem List:  Patient Active Problem List   Diagnosis    Iron deficiency anemia secondary to inadequate dietary iron intake    Gastric bypass status for obesity    Chronic pain syndrome    Exocrine pancreatic insufficiency    Asplenia    BLU (obstructive sleep apnea)    H/O of rectopexy    Dysthymia    Anxiety    Thyroid nodule    Acquired hypothyroidism    Post-pancreatectomy diabetes (H)    Other iron deficiency anemia    Urge incontinence    Urinary urgency    High-tone pelvic floor dysfunction    Pyuria    Recurrent kidney stones    Vaginal atrophy    Enteric hyperoxaluria    Hypocitraturia    Acute sepsis (H)    S/P partial hysterectomy    Generalized abdominal pain    Diarrhea, unspecified type    Infectious tenosynovitis    Cellulitis of left index finger    Open wound of left index finger due to dog bite         Assessment:  1.  Post pancreatectomy diabetes mellitus, s/p total pancreatectomy and islet autotransplant.  (ICD-10 E89.1)  2.  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  Rapid gastric emptying-- must use Viokace as she does not respond to enteric coated enzymes  4.  Concern for recurring cholangitis (keep on list for FMT options when available)    Lynn is a 59 year old with history of gastric bypass, chronic pancreatitis who is s/p total pancreatectomy and islet autotransplant.  She was insulin independent until 6/6/2017 (just after 4 years post-tx) and now has partial islet function.    She is on Fiasp due to rapid gastric emptying and post meal hypoglycemia.  She is on Viokace because she only responds to non enteric coated enzymes and otherwise has severe malabsorption.  Other pertinent TPIAT hx includes past febrile cholangitis and other endocrine hx includes acquired hypothyroidism on levothyroxine.    Today she is doing overall well for  diabetes management, but with post meal hyperglycemia so recommending changes below.  She needs to remain on Viokace due to her anatomy and EPI post pancreatectomy and we have vitamin levels pending and will adjust vitamin supplementation as needed.         Plan:  1.  Changes to current diabetes regimen:  Patient Instructions   1)  Pump settings:  Suggest change I:C ratio to 8 grams, new settings:  Basal 12a 0.15, 8a 0.35 (in auto mode)  Bolus  I:C 1 per 8g   mg/dL  Target 110 (150) mg/dL    2)  Refills:  Fiasp, Glucagon (Gvoke) sent.  Also sent Lantus 'to have on hand' at pharmacy for Rx.     3)  For changing to OP5 jade: From the OP5/Insulet website:  -- Download the Omnipod 5 Jade from the Jade Store  -- Log in using your Omnipod ID and password  -- Transfer your current Omnipod 5 settings during First Time Setup.  -- Start a new Pod  For resources and steps to get started you can visit [employer]  One caveat is that you will 'start over' when you switch to the jade, so it may take 1-2 weeks for your pump to relearn you with the change.      4)  Will watch vitamin levels (on Over the counter vitamin). Agree with neurontin for the possible neuropathy pain.     Follow up in March (in Epic).  Also would like to get DXA then.          2.  Frequency of blood sugar checks:  premeal and bedtime, and as needed for hypoglycemia (6 strip per day).    3.  Continue routine follow up for autoislet transplant patients:  Mixed meal test (6 mL/kg BoostHP to max of 360 mL) at 3 months, 6 months, and once a year post transplant.  Hemoglobin A1c levels at these time points and quarterly.    4.  Other issues addressed today:  As above    Follow up:  As above        Contact me for questions at [phone] or [phone].  Emergency number to reach pediatric endocrinology after hours is [phone].        Dr. Adriana Robles MD  [facility]  Director of  Research, Islet Auto-transplant Program  Phone:  [phone]  Electronically signed: November 21, 2024 at 2:24 PM          Review of the result(s) of each unique test - CGM, pump  Prescription drug management  40 minutes spent by me on the date of the encounter doing chart review, history and exam, documentation and further activities per the note   The longitudinal plan of care for the diagnosis(es)/condition(s) as documented were addressed during this visit. Due to the added complexity in care, I will continue to support Lynn in the subsequent management and with ongoing continuity of care.          HPI:  Lynn is a 59 year old female here for follow up o total pancreatectomy, islet cell autotransplant, splenectomy, liver biopsy (needle core), choledochoduodenostomy, feeding jejunostomy performed on 5/10/2013.  At the time of the procedure, the patient received 178,100 IEQ, or 3,209 IEQ/kg body weight. She has had two subsequent exploratory laparatomy for small bowel obstruction. Other notable endocrine history includes a complex cystic nodule in mid right thyroid lobe with 1 cm maximum diameter (saw Dr Adorno in 11/2016) and mild hypothyroidism followed by PCP, pathology in 2018 by FNA showed a benign nodule (includes adenomatoid nodule, colloid nodule, etc.).  She had an episode of cholangitis and was hospitalized for 4 days 8/24/17 (fevers, RUQ pain, + Ecoli blood cx).  She had another similar episode in March 20204.     She was on NO insulin at her 1 year, 2 year, 3 year, 4 year transplant anniversaries and restart insulin just after 4 years post-tx, insulin restart date of  6/6/2017.   She had a slow opioid wean off suboxone but eventually did come off.     Other history notable for surgical procedure Admitted from 2/1- 2/5/22 for  1. Posterior suture rectopexy (Colorectal primary)  2. Sigmoidoscopy (Colorectal primary)  3. Supracervical hysterectomy with bilateral salpingooophrectomy (Uro)  4.  Uterosacral ligament suspension (Uro)  5. Gan colposuspension (Uro)  6. Cystoscopy (Uro)      Interval history:  - New :  Hospitalized 11/1-11/5 at [facility] for debribement and IV abx for infected dog bite with tenosynovitis and cellulitis on hand.  - Pump report overall looks great but noted persistent hyperglycemia post meal despite routine use of meal bolusing.  Did make some adjustments with hospital nurse to ISF.  - Neuropathy ? In feet- has burning, pain, marck at night     1)  Diabetes data:  Lynn continues to have partial islet graft function.   She is on Fiasp right before meal time and has rapid gastric emptying.    Pump, settings below.    Basal Rates:  12a 0.15 unit(s)/hr  8a 0.35 units/hr      Target Glucose  12a 110 (150)      Sensitivity   12a 100 mg/dL     Insulin to Carb Ratio  12 am 8 grams     Active insulin time 3 hours  Reverse correction off    Total daily insulin dose= 13.1 units of Fiasp, of which 5.3 units per day is basal on pump, and reaminder is bolus.               HbA1c levels:  Lab Results   Component Value Date    A1C 6.8 11/21/2024    A1C 6.6 08/23/2024    A1C 6.7 03/03/2024    A1C 7.1 09/07/2023    A1C 6.6 04/03/2023    A1C 6.8 05/10/2021    A1C 6.9 02/16/2021    A1C 6.7 08/20/2020    A1C 7.1 06/05/2020    A1C 6.9 02/06/2020       Hypoglycemia history:   Recent history as above.  The patient has had NO episodes of severe hypoglycemia (seizure, coma, or neuroglycopenic symptoms severe enough to require assistance from another person).  Blood sugars were reviewed from the patient records and/or the meter download.        Patient is good candidate for CGM therapy.  Meets these criteria:  -- Has a diagnosis of diabetes,: This patient has type 1 diabetes secondary to pancreatectomy (surgical type 1)    --  Uses a home blood glucose monitor (BGM) and conduct four or more daily BGM tests, or is on CGM therapy:  Meter, 4 per day  --- Treated with insulin with multiple daily injections or  a continuous subcutaneous insulin infusion (CSII) pump:  This patient is on MDI, using a total of 4 injections daily.   --  Require frequent adjustments of the insulin treatment regimen, based on therapeutic CGM test results      2)  Nutrition./PERT: Needs Viokace (due to gastric anatomy, rapid emptying).      3)  Thyroid:  On levothyroxine - recent labs below - now at 125 mcg daily.   TSH   Date Value Ref Range Status   2024 0.25 (L) 0.30 - 4.20 uIU/mL Final   2021 2.75 0.40 - 4.00 mU/L Final   03/15/2021 2.93 0.40 - 4.00 mU/L Final     T4 Free   Date Value Ref Range Status   2020 1.05 0.76 - 1.46 ng/dL Final     Free T4   Date Value Ref Range Status   2024 1.65 0.90 - 1.70 ng/dL Final     Review of systems:  A complete Review Of Systems was performed but was negative except as noted in the HPI.    Past Medical and Surgical History:  Past Medical History:   Diagnosis Date    Benign paroxysmal positional vertigo     occ.     Calculus of kidney 05/2005    x1 on L side passed, several stones.  Has been tested for oxalate.    Chronic abdominal pain 2013    Chronic pancreatitis 2013    Depression     also occ panic spells    Diabetes (H) 5/10/2013    Total Pancreatomy with Auto Islets Transplant    Dyspepsia 1999    H. pylori   treated    Dysthymia 2016    Headaches     still periodic HA's ;  often 5X/week    Hypertension 2016    Stress related    Iron deficiency anemia 2003    relates to gastric bypass    Post-pancreatectomy diabetes melltius 2013    Sleep apnea     uses splint    Spasm of sphincter of Oddi     surgical + endoscopic stenting of pancreatic duct @ [facility] [place] 06    Thyroid nodule 2016     Past Surgical History:   Procedure Laterality Date    ABDOMINOPLASTY  2002    Tummy tuck    APPENDECTOMY  1990    BUNIONECTOMY Right 1998    CBD Stent placement  2002    CBD stent; Dr. Presley     SECTION      CHOLECYSTECTOMY       COLONOSCOPY N/A 03/31/2021    Procedure: COLONOSCOPY INCOMPLETE Aborted due to incomplete prep  will need to take additional prep and return tomorrow 4/1/21;  Surgeon: Ihsan Saenz MD;  Location: [facility]    COMBINED CYSTOSCOPY, RETROGRADES, URETEROSCOPY, LASER HOLMIUM LITHOTRIPSY URETER(S), INSERT STENT Right 03/23/2015    Procedure: COMBINED CYSTOSCOPY, RETROGRADES, URETEROSCOPY, LASER HOLMIUM LITHOTRIPSY URETER(S), INSERT STENT;  Surgeon: Kennedi Aldana MD;  [facility]: [facility]    COMBINED CYSTOSCOPY, RETROGRADES, URETEROSCOPY, LASER HOLMIUM LITHOTRIPSY URETER(S), INSERT STENT Right 04/20/2015    Procedure: COMBINED CYSTOSCOPY, RETROGRADES, URETEROSCOPY, LASER HOLMIUM LITHOTRIPSY URETER(S), INSERT STENT;  Surgeon: Kennedi Aldana MD;  [facility]: [facility]    COSMETIC SURGERY  6/24/2002    Tummy tuck    CYSTECTOMY OVARIAN BENIGN Right     CYSTOSCOPY, RETROGRADES, INSERT STENT URETER(S), COMBINED  10/02/2012    Procedure: COMBINED CYSTOSCOPY, RETROGRADES, INSERT STENT URETER(S);  COMBINED CYSTOSCOPY,  , INSERT LEFT STENT URETER;  Surgeon: Johny Baez MD;  Location: [facility]    CYSTOSCOPY, RETROGRADES, INSERT STENT URETER(S), COMBINED Left 9/20/2022    Procedure: Cystoscopy with left retrograde pyelogram, left ureteroscopy, thulium laser lithotripsy of left ureteral calculus, stone basketing and left ureteral stent insertion;  Surgeon: Alonzo Rome MD;  Location: [facility]    ESOPHAGOSCOPY, GASTROSCOPY, DUODENOSCOPY (EGD), COMBINED N/A 01/24/2018    Procedure: COMBINED ESOPHAGOSCOPY, GASTROSCOPY, DUODENOSCOPY (EGD);  ESOPHAGOSCOPY, GASTROSCOPY, DUODENOSCOPY (EGD)    ;  Surgeon: Tamir Rodgers MD;  Location: [facility]    EXTRACORPOREAL SHOCK WAVE LITHOTRIPSY (ESWL)  10/16/2012    Procedure: EXTRACORPOREAL SHOCK WAVE LITHOTRIPSY (ESWL);  left EXTRACORPOREAL SHOCK WAVE LITHOTRIPSY (ESWL) ;  Surgeon: Johny Baez MD;  Location: [facility]    Gastric bypass NOS      HERNIA REPAIR  02/2015    HYSTERECTOMY  SUPRACERVICAL, BILATERAL SALPINGO-OOPHORECTOMY, COMBINED N/A 02/01/2022    Procedure: Abdominal supracervical hysterectomy, bilateral salpingooophrectomy;  Surgeon: Nicole Rivera MD;  [facility]    INCISION AND DRAINAGE FINGER, COMBINED Left 11/2/2024    Procedure: Left index finger incision and debridement to the level of bone, dorsally, 2cm2. Left index finger proximal interphalangial joint irrigation. Left index finger incision and debridement to the level of the flexor tendon sheath, 3 cm2.;  Surgeon: Aileen Reza MD;  Location: [facility]    IRRIGATION AND DEBRIDEMENT HAND, COMBINED Left 10/30/2020    Procedure: Left hand sharp excisional debridement of skin, subcutaneous tissue and fat with a scalpel, 2 x 1 x 1 cm.;  Surgeon: Demian Renteria MD;  Location: [facility]    LAPAROSCOPIC LYSIS ADHESIONS N/A 02/20/2015    Procedure: LAPAROSCOPIC LYSIS ADHESIONS;  Surgeon: Aaron Early MD;  [facility]    LAPAROSCOPIC LYSIS ADHESIONS N/A 12/29/2015    Procedure: LAPAROSCOPIC LYSIS ADHESIONS;  Surgeon: Aaron Early MD;  [facility]    PANCREATECTOMY, TRANSPLANT AUTO ISLET CELL, COMBINED  05/10/2013    Procedure: COMBINED PANCREATECTOMY, TRANSPLANT AUTO ISLET CELL;  Pancreatectomy, Auto Islet Cell Transplant   hernia repair, jejunostomy tube and liver biopsies with Anesthesia General with block;  Surgeon: Aaron Early MD;  [facility]    Partial ileum resection  1992    RECTOPEXY ABDOMINAL N/A 02/01/2022    Procedure: RECTOPEXY, ABDOMINAL;  Surgeon: Uriah Sheridan MD;  [facility]    RELEASE TRIGGER FINGER Left 11/2/2024    Procedure: Left index finger A1 pulley release.;  Surgeon: Aileen Reza MD;  [facility]: [facility]    REPAIR PTOSIS BROW BILATERAL Bilateral 06/09/2020    Procedure: BILATERAL BROW PTOSIS REPAIR;  Surgeon: Denise Alberts MD;  Location: [facility]    SACROCOLPOPEXY, CYSTOSCOPY, COMBINED N/A 02/01/2022    Procedure: Uterosacral ligament  suspension, pina colposuspension with Cystoscopy;  Surgeon: Nicole Rivera MD;  [facility]    SIGMOIDOSCOPY FLEXIBLE N/A 2022    Procedure: SIGMOIDOSCOPY, FLEXIBLE;  Surgeon: Uriah Sheridan MD;  [facility]    Surgery for SBO      TONSILLECTOMY, ADENOIDECTOMY, COMBINED  1997    TRANSPLANT  5/10/13    Pancreatic Auto-Islet Transplant       Family History:  New changes since last visit:  none  Family History   Problem Relation Age of Onset    Family History Negative Mother     Respiratory Father         COPD;  at 69    Genitourinary Problems Father         kidney stones    Substance Abuse Father     Depression Father     Asthma Father     Heart Disease Paternal Grandfather         M.I.    Coronary Artery Disease Paternal Grandfather     Hyperlipidemia Paternal Grandfather     Genitourinary Problems Brother         multiple brothers with kidney stones    Gastrointestinal Disease Maternal Grandmother         undiagnosed 'gut' issues    Coronary Artery Disease Maternal Grandfather     Hyperlipidemia Maternal Grandfather     Cerebrovascular Disease Paternal Grandmother         At the age of 103    Anxiety Disorder Paternal Grandmother     Osteoporosis Paternal Grandmother     Anxiety Disorder Son     Anxiety Disorder Daughter     Asthma Daughter     Colon Cancer No family hx of        Social History:  Social History     Social History Narrative    Not on file     Does not work currently.  Mom to many foster dogs     Physical Exam:  Vitals: /77   Pulse 67   Wt 63.5 kg (139 lb 15.9 oz)   LMP 2013   SpO2 97%   BMI 24.03 kg/m    BMI= Body mass index is 24.03 kg/m .  General:  Appearance is normal, no acute distress  HEENT:  NC/AT, sclera appear white  Neck:  No thyroid nodule palpable.   Neck:  No obvious thyromegaly  CV/Lungs:  Non distressed breathing  Skin:  No apparent rashes.   Neuro:  Normal mental status  Psych:  Normal affect    Results.  Mixed Meal Test:  C  Peptide   Date Value Ref Range Status   09/07/2023 2.2 0.9 - 6.9 ng/mL Final   09/07/2023 1.8 0.9 - 6.9 ng/mL Final   09/15/2022 1.6 0.9 - 6.9 ng/mL Final   09/15/2022 1.2 0.9 - 6.9 ng/mL Final   09/15/2022 0.3 (L) 0.9 - 6.9 ng/mL Final   08/20/2020 3.4 0.9 - 6.9 ng/mL Final   08/20/2020 1.5 0.9 - 6.9 ng/mL Final   08/20/2020 0.5 (L) 0.9 - 6.9 ng/mL Final   05/16/2019 1.8 0.9 - 6.9 ng/mL Final   05/16/2019 1.5 0.9 - 6.9 ng/mL Final     Glucose   Date Value Ref Range Status   11/21/2024 249 (H) 70 - 99 mg/dL Final   11/21/2024 335 (H) 70 - 99 mg/dL Final   11/21/2024 137 (H) 70 - 99 mg/dL Final   06/14/2022 104 (H) 70 - 99 mg/dL Final   04/16/2022 241 (H) 70 - 99 mg/dL Final   02/01/2022 90 70 - 99 mg/dL Final   01/18/2022 123 (H) 70 - 99 mg/dL Final   11/26/2021 139 (H) 70 - 99 mg/dL Final   05/10/2021 169 (H) 70 - 99 mg/dL Final   03/01/2021 141 (H) 70 - 99 mg/dL Final   02/28/2021 120 (H) 70 - 99 mg/dL Final   02/16/2021 106 (H) 70 - 99 mg/dL Final   10/30/2020 126 (H) 70 - 99 mg/dL Final     GLUCOSE BY METER POCT   Date Value Ref Range Status   11/05/2024 110 (H) 70 - 99 mg/dL Final   11/05/2024 107 (H) 70 - 99 mg/dL Final   11/05/2024 115 (H) 70 - 99 mg/dL Final     Comment:     /RN Notified   11/04/2024 121 (H) 70 - 99 mg/dL Final   11/04/2024 117 (H) 70 - 99 mg/dL Final       Lab on 11/21/2024   Component Date Value Ref Range Status    Estimated Average Glucose 11/21/2024 148 (H)  <117 mg/dL Final    Hemoglobin A1C 11/21/2024 6.8 (H)  <5.7 % Final    Normal <5.7%   Prediabetes 5.7-6.4%    Diabetes 6.5% or higher     Note: Adopted from ADA consensus guidelines.    Sodium 11/21/2024 144  135 - 145 mmol/L Final    Potassium 11/21/2024 3.8  3.4 - 5.3 mmol/L Final    Carbon Dioxide (CO2) 11/21/2024 27  22 - 29 mmol/L Final    Anion Gap 11/21/2024 10  7 - 15 mmol/L Final    Urea Nitrogen 11/21/2024 15.2  8.0 - 23.0 mg/dL Final    Creatinine 11/21/2024 1.07 (H)  0.51 - 0.95 mg/dL Final    GFR Estimate 11/21/2024 59  (L)  >60 mL/min/1.73m2 Final    eGFR calculated using 2021 CKD-EPI equation.    Calcium 11/21/2024 9.6  8.8 - 10.4 mg/dL Final    Reference intervals for this test were updated on 7/16/2024 to reflect our healthy population more accurately. There may be differences in the flagging of prior results with similar values performed with this method. Those prior results can be interpreted in the context of the updated reference intervals.    Chloride 11/21/2024 107  98 - 107 mmol/L Final    Glucose 11/21/2024 137 (H)  70 - 99 mg/dL Final    Alkaline Phosphatase 11/21/2024 110  40 - 150 U/L Final    AST 11/21/2024 43  0 - 45 U/L Final    ALT 11/21/2024 42  0 - 50 U/L Final    Protein Total 11/21/2024 7.0  6.4 - 8.3 g/dL Final    Albumin 11/21/2024 4.1  3.5 - 5.2 g/dL Final    Bilirubin Total 11/21/2024 0.5  <=1.2 mg/dL Final    Patient Fasting > 8hrs? 11/21/2024 Unknown   Final    Iron 11/21/2024 84  37 - 145 ug/dL Final    Iron Binding Capacity 11/21/2024 272  240 - 430 ug/dL Final    Iron Sat Index 11/21/2024 31  15 - 46 % Final    Ferritin 11/21/2024 177  11 - 328 ng/mL Final    Cholesterol 11/21/2024 205 (H)  <200 mg/dL Final    Triglycerides 11/21/2024 175 (H)  <150 mg/dL Final    Direct Measure HDL 11/21/2024 69  >=50 mg/dL Final    LDL Cholesterol Calculated 11/21/2024 101 (H)  <100 mg/dL Final    Non HDL Cholesterol 11/21/2024 136 (H)  <130 mg/dL Final    Patient Fasting > 8hrs? 11/21/2024 Unknown   Final    WBC Count 11/21/2024 7.2  4.0 - 11.0 10e3/uL Final    RBC Count 11/21/2024 4.16  3.80 - 5.20 10e6/uL Final    Hemoglobin 11/21/2024 12.4  11.7 - 15.7 g/dL Final    Hematocrit 11/21/2024 38.0  35.0 - 47.0 % Final    MCV 11/21/2024 91  78 - 100 fL Final    MCH 11/21/2024 29.8  26.5 - 33.0 pg Final    MCHC 11/21/2024 32.6  31.5 - 36.5 g/dL Final    RDW 11/21/2024 13.8  10.0 - 15.0 % Final    Platelet Count 11/21/2024 417  150 - 450 10e3/uL Final    % Neutrophils 11/21/2024 56  % Final    % Lymphocytes 11/21/2024  29  % Final    % Monocytes 11/21/2024 10  % Final    % Eosinophils 11/21/2024 4  % Final    % Basophils 11/21/2024 2  % Final    % Immature Granulocytes 11/21/2024 0  % Final    NRBCs per 100 WBC 11/21/2024 0  <1 /100 Final    Absolute Neutrophils 11/21/2024 4.0  1.6 - 8.3 10e3/uL Final    Absolute Lymphocytes 11/21/2024 2.1  0.8 - 5.3 10e3/uL Final    Absolute Monocytes 11/21/2024 0.7  0.0 - 1.3 10e3/uL Final    Absolute Eosinophils 11/21/2024 0.3  0.0 - 0.7 10e3/uL Final    Absolute Basophils 11/21/2024 0.1  0.0 - 0.2 10e3/uL Final    Absolute Immature Granulocytes 11/21/2024 0.0  <=0.4 10e3/uL Final    Absolute NRBCs 11/21/2024 0.0  10e3/uL Final    Glucose 11/21/2024 335 (H)  70 - 99 mg/dL Final    Patient Fasting > 8hrs? 11/21/2024 Yes   Final

## 2024-11-22 LAB
C PEPTIDE SERPL-MCNC: 0.5 NG/ML (ref 0.9–6.9)
C PEPTIDE SERPL-MCNC: 1.9 NG/ML (ref 0.9–6.9)
C PEPTIDE SERPL-MCNC: 2.5 NG/ML (ref 0.9–6.9)
FASTING STATUS PATIENT QL REPORTED: NORMAL
FASTING STATUS PATIENT QL REPORTED: YES
FASTING STATUS PATIENT QL REPORTED: YES
ZINC SERPL-MCNC: 84.3 UG/DL

## 2024-11-24 LAB
A-TOCOPHEROL VIT E SERPL-MCNC: 7.1 MG/L
ANNOTATION COMMENT IMP: NORMAL
BETA+GAMMA TOCOPHEROL SERPL-MCNC: 1.2 MG/L
RETINYL PALMITATE SERPL-MCNC: <0.02 MG/L
VIT A SERPL-MCNC: 0.45 MG/L

## 2024-11-25 ENCOUNTER — TRANSFERRED RECORDS (OUTPATIENT)
Dept: HEALTH INFORMATION MANAGEMENT | Facility: CLINIC | Age: 59
End: 2024-11-25

## 2024-11-25 ENCOUNTER — TELEPHONE (OUTPATIENT)
Dept: ENDOCRINOLOGY | Facility: CLINIC | Age: 59
End: 2024-11-25
Payer: COMMERCIAL

## 2024-11-25 NOTE — TELEPHONE ENCOUNTER
Retail Pharmacy Prior Authorization Team   Phone: [phone]    [id number]

## 2024-11-27 NOTE — TELEPHONE ENCOUNTER
PA Initiation    Medication: GVOKE HYPOPEN 1-PACK 1 MG/0.2ML SC SOAJ  Insurance Company: [employer] - Phone [phone] Fax [phone]  Pharmacy Filling the Rx: [id number] [place], [place] - [address] [address]  Filling Pharmacy Phone: [phone]  Filling Pharmacy Fax: [phone]  Start Date: 11/27/2024

## 2024-11-27 NOTE — TELEPHONE ENCOUNTER
Prior Authorization Approval    Medication: GVOKE HYPOPEN 1-PACK 1 MG/0.2ML SC SOAJ  Authorization Effective Date: 11/27/2024  Authorization Expiration Date: 11/27/2026  Approved Dose/Quantity:   Reference #:     Insurance Company: [employer] - Phone [phone] Fax [phone]  Expected CoPay: $    CoPay Card Available:      Financial Assistance Needed:   Which Pharmacy is filling the prescription: [id number] [place]  [address] [address]  Pharmacy Notified: YES  Patient Notified: **Instructed pharmacy to notify patient when script is ready to /ship.**

## 2024-11-30 LAB
DEPRECATED CALCIDIOL+CALCIFEROL SERPL-MC: <96 UG/L (ref 20–75)
VITAMIN D2 SERPL-MCNC: <5 UG/L
VITAMIN D3 SERPL-MCNC: 91 UG/L

## 2024-12-01 NOTE — LETTER
8/15/2023       RE: [place] Thompson  [id number] [address] [address] [id number]     Dear Colleague,    Thank you for referring your patient, Lynn Thompson, to the [facility] NEPHROLOGY [facility] [place] at [facility]. Please see a copy of my visit note below.    [facility]ph[facility]  08/15/2023     Lynn Thompson MRN:[id number] YOB: 1965  Primary care provider: Maryana Brooks  Requesting physician: Alonzo Rome     ASSESSMENT AND RECOMMENDATIONS:   Lynn Thompson is a 58 year old presenting for nephrolithiasis.  # Recurrent kidney stones: stone type primarily calcium oxalate though 2 analysis showed small component of calcium phosphate  - has had multiple operations and also very strong family history so likely combination of genetics along with enteric hyperoxaluria after gastric bypass and pancreatectomy    # enteric hyperoxaluria - improved but not controlled on calcium supplements (was not on calcium supplements initially in 2015 when urine collection showed urine oxalate >100 mg and urine calcium <100 mg)  - urine oxalate still high despite cutting out spinach, will increase calcium citrate pills to 2 twice a day with meals    # acceptable renal function based on Oct 2022 studies  - iohexol GFR 66 ml/min/1.73 m2 (raw clearance 59 ml/min)  - cystatin C GFR 73  - creatinine based eGFR 80's so creatinine does overestimate renal function in Lynn    # history Quinn en Y gastric bypass  # history Crohns - s/p partial resection    At risk for making more kidney stones so will benefit from urine chemistry/supersaturation evaluation and monitoring and regular follow up for kidney stone prevention.    Other co-morbidities:  # history chronic pancreatitis  # total pancreatectomy with auto islet 2013 - follows with Dr. 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    Known issue that I take into account for other medical decisions, no current exacerbations or new concerns.     Repeat 24 hour chemistries in 2024  Repeat imaging per Dr. Rome  Return in about 6 months (around 2/15/2024).     34 minutes spent on visit, meeting with Lynn Thompson and in chart review and documentation on date of encounter, 08/15/23      Hannah Murcia MD  [facility]  Department of Medicine  Division of Renal Disease and Hypertension  [facility]  Vocera Web Console        REASON FOR VISIT: nephrolithiasis    HISTORY OF PRESENT ILLNESS:  Lynn Thompson is a 58 year old with history Crohn's with ileal resection , gale en Y gastric bypass (), chronic pancreatitis s/p pancreatectomy and auto islet cell transplant () with feeding tube 4-5 months after, post pancreatectomy diabetes (), kidney stones who presents for follow up. Previously saw Dr. Solano, last visit in , for recurrent kidney stones with severe hyperoxaluria was managed with calcium carbonate suspension and potassium citrate.    Has history of intermittent SBO and in past dependent on liquid diet, has required lysis of adhesions in past multiple times.    Stone surgical history:   2022 Left URS, laser lithotripsy, stone basketing  2015 right URS and lithotripsy with stone basketing  3/23/15 right URS, lithotripsy  10/16/2012 left ESWL  3/2011 right stent for ureteral stone and obstruction  2011 right URS and stone extraction  3/5/2008 right and left ESWL    Last imagin2022 ultrasound - no  stones mentioned on report    Stone [place]:   Left: cleared 99% 9/2022  Right: 4 mm lower pole stone noted on CT 9/19/2022    Diet:  Cannot eat a whole lot in one sitting, grazes  Watches what she eats  Proteins - chicken, eggs, ground beef or ground turkey, fish  Calcium - yogurt, cheese, calcium citrate with vitamin D - 1 pill am and 1 pill pm  Some whole beans and grains  Fruit and vegetable - watches sugar content of fruit, berries, grapefruit, watermelon, pineapple, oranges  Broccoli, stir lovell, chopped cabbage salad, carrots, spinach, potato    Never feels hungry, has to make herself eat, usually does not eat breakfast    Fluids - 1 cup coffee, 1 diet coke, water (plain), seldom herbal tea, alcohol less than 4 drinks per year    Has diarrhea/dumping with too much sugar, since rectal prolapse, has been more diligent with foods that impact GI system    Had recent spine surgery.     Last visit 5/2/2023  Hospitalized 6/30/23-7/3/2023 with sepsis, pyelonephritis and e coli bacteremia - completed 14 days of therapy (oral augmentin).    Has been taking spinach out of diet. Taking calcium citrate twice a day with food.    I reviewed 24 hour urine chemstries:                  Family history is positive for kidney stones in several siblings, older brother has had hundreds, father and grandfather also had kidney stones    REVIEW OF SYSTEMS:  A 10 point review of systems was negative except as noted above.    Problem list  Patient Active Problem List   Diagnosis    Iron deficiency anemia secondary to inadequate dietary iron intake    Gastric bypass status for obesity    Health Care Home    Chronic pain syndrome    Exocrine pancreatic insufficiency    Asplenia    BLU (obstructive sleep apnea)    H/O of rectopexy    Dysthymia    Anxiety    Thyroid nodule    Acquired hypothyroidism    Post-pancreatectomy diabetes (H)    Other iron deficiency anemia    Uterovaginal prolapse, unspecified    Rectal prolapse    Small bowel  obstruction (H)    Urge incontinence    Urinary urgency    High-tone pelvic floor dysfunction    Pyuria    Recurrent kidney stones    History of uterine prolapse    Vaginal atrophy    Enteric hyperoxaluria    Hypocitraturia    Acute pyelonephritis    Acute sepsis (H)     PSH  Past Surgical History:   Procedure Laterality Date    ABDOMINOPLASTY  2002    Tummy tuck    APPENDECTOMY  1990    BUNIONECTOMY Right 1998    CBD Stent placement  2002    CBD stent; Dr. Presley     SECTION      CHOLECYSTECTOMY      COLONOSCOPY N/A 2021    Procedure: COLONOSCOPY INCOMPLETE Aborted due to incomplete prep  will need to take additional prep and return tomorrow 21;  Surgeon: Ihsan Saenz MD;  Location: [facility]    COMBINED CYSTOSCOPY, RETROGRADES, URETEROSCOPY, LASER HOLMIUM LITHOTRIPSY URETER(S), INSERT STENT Right 2015    Procedure: COMBINED CYSTOSCOPY, RETROGRADES, URETEROSCOPY, LASER HOLMIUM LITHOTRIPSY URETER(S), INSERT STENT;  Surgeon: Kennedi Aldana MD;  Location: [facility]    COMBINED CYSTOSCOPY, RETROGRADES, URETEROSCOPY, LASER HOLMIUM LITHOTRIPSY URETER(S), INSERT STENT Right 2015    Procedure: COMBINED CYSTOSCOPY, RETROGRADES, URETEROSCOPY, LASER HOLMIUM LITHOTRIPSY URETER(S), INSERT STENT;  Surgeon: Kennedi Aldana MD;  Location: [facility]    COSMETIC SURGERY  2002    Tummy tuck    CYSTECTOMY OVARIAN BENIGN Right     CYSTOSCOPY, RETROGRADES, INSERT STENT URETER(S), COMBINED  10/02/2012    Procedure: COMBINED CYSTOSCOPY, RETROGRADES, INSERT STENT URETER(S);  COMBINED CYSTOSCOPY,  , INSERT LEFT STENT URETER;  Surgeon: Johny Baez MD;  Location: [facility]    CYSTOSCOPY, RETROGRADES, INSERT STENT URETER(S), COMBINED Left 2022    Procedure: Cystoscopy with left retrograde pyelogram, left ureteroscopy, thulium laser lithotripsy of left ureteral calculus, stone basketing and left ureteral stent insertion;  Surgeon: Alonzo Rome MD;  Location: [facility]     ESOPHAGOSCOPY, GASTROSCOPY, DUODENOSCOPY (EGD), COMBINED N/A 01/24/2018    Procedure: COMBINED ESOPHAGOSCOPY, GASTROSCOPY, DUODENOSCOPY (EGD);  ESOPHAGOSCOPY, GASTROSCOPY, DUODENOSCOPY (EGD)    ;  Surgeon: Tamir Rodgers MD;  Location: [facility]    EXTRACORPOREAL SHOCK WAVE LITHOTRIPSY (ESWL)  10/16/2012    Procedure: EXTRACORPOREAL SHOCK WAVE LITHOTRIPSY (ESWL);  left EXTRACORPOREAL SHOCK WAVE LITHOTRIPSY (ESWL) ;  Surgeon: Johny Baez MD;  Location: [facility]    Gastric bypass NOS      HERNIA REPAIR  02/2015    HYSTERECTOMY SUPRACERVICAL, BILATERAL SALPINGO-OOPHORECTOMY, COMBINED N/A 02/01/2022    Procedure: Abdominal supracervical hysterectomy, bilateral salpingooophrectomy;  Surgeon: Nicole Rivera MD;  [facility]    IRRIGATION AND DEBRIDEMENT HAND, COMBINED Left 10/30/2020    Procedure: Left hand sharp excisional debridement of skin, subcutaneous tissue and fat with a scalpel, 2 x 1 x 1 cm.;  Surgeon: Demian Renteria MD;  Location: [facility]    LAPAROSCOPIC LYSIS ADHESIONS N/A 02/20/2015    Procedure: LAPAROSCOPIC LYSIS ADHESIONS;  Surgeon: Aaron Early MD;  [facility]    LAPAROSCOPIC LYSIS ADHESIONS N/A 12/29/2015    Procedure: LAPAROSCOPIC LYSIS ADHESIONS;  Surgeon: Aaron Early MD;  [facility]    PANCREATECTOMY, TRANSPLANT AUTO ISLET CELL, COMBINED  05/10/2013    Procedure: COMBINED PANCREATECTOMY, TRANSPLANT AUTO ISLET CELL;  Pancreatectomy, Auto Islet Cell Transplant   hernia repair, jejunostomy tube and liver biopsies with Anesthesia General with block;  Surgeon: Aaron Early MD;  [facility]    Partial ileum resection  1992    RECTOPEXY ABDOMINAL N/A 02/01/2022    Procedure: RECTOPEXY, ABDOMINAL;  Surgeon: Uriah Sheridan MD;  [facility]    REPAIR PTOSIS BROW BILATERAL Bilateral 06/09/2020    Procedure: BILATERAL BROW PTOSIS REPAIR;  Surgeon: Denise Alberts MD;  Location: [facility]    SACROCOLPOPEXY, CYSTOSCOPY, COMBINED N/A 02/01/2022     Procedure: Uterosacral ligament suspension, pina colposuspension with Cystoscopy;  Surgeon: Nicole Rivera MD;  [facility]    SIGMOIDOSCOPY FLEXIBLE N/A 02/01/2022    Procedure: SIGMOIDOSCOPY, FLEXIBLE;  Surgeon: Uriah Sheridan MD;  [facility]    Surgery for SBO  2015    TONSILLECTOMY, ADENOIDECTOMY, COMBINED  1997    TRANSPLANT  5/10/13    Pancreatic Auto-Islet Transplant       Social  Disability  Rescues dogs  Social History     Socioeconomic History    Marital status:      Spouse name: Tera    Number of children: 2    Years of education: Not on file    Highest education level: Some college, no degree   Occupational History    Occupation: [employer] service     Employer: [employer]   Tobacco Use    Smoking status: Never    Smokeless tobacco: Never   Vaping Use    Vaping Use: Never used   Substance and Sexual Activity    Alcohol use: Not Currently    Drug use: Not Currently    Sexual activity: Yes     Partners: Male     Birth control/protection: Post-menopausal   Other Topics Concern    Parent/sibling w/ CABG, MI or angioplasty before 65F 55M? No   Social History Narrative    Not on file     Social Determinants of Health     Financial Resource Strain: Medium Risk (12/9/2022)    Overall Financial Resource Strain (CARDIA)     Difficulty of Paying Living Expenses: Somewhat hard   Food Insecurity: No Food Insecurity (12/9/2022)    Hunger Vital Sign     Worried About Running Out of Food in the Last Year: Never true     Ran Out of Food in the Last Year: Never true   Transportation Needs: No Transportation Needs (12/9/2022)    PRAPARE - Transportation     Lack of Transportation (Medical): No     Lack of Transportation (Non-Medical): No   Physical Activity: Insufficiently Active (12/9/2022)    Exercise Vital Sign     Days of Exercise per Week: 2 days     Minutes of Exercise per Session: 30 min   Stress: Stress Concern Present (12/9/2022)    [place] [employer] -  Occupational Stress Questionnaire     Feeling of Stress : To some extent   Social Connections: Socially Integrated (12/9/2022)    Social Connection and Isolation Panel [NHANES]     Frequency of Communication with Friends and Family: Twice a week     Frequency of Social Gatherings with Friends and Family: Once a week     Attends [employer] Services: More than 4 times per year     Active Member of Clubs or Organizations: Yes     Attends Club or Organization Meetings: Not on file     Marital Status:    Intimate Partner Violence: Not At Risk (9/9/2021)    Humiliation, Afraid, Rape, and Kick questionnaire     Fear of Current or Ex-Partner: No     Emotionally Abused: No     Physically Abused: No     Sexually Abused: No   Housing Stability: Low Risk  (12/9/2022)    Housing Stability Vital Sign     Unable to Pay for Housing in the Last Year: No     Number of Places Lived in the Last Year: 1     Unstable Housing in the Last Year: No     Family history  + kidney stones  No kidney failure       MEDICATIONS:  Current Outpatient Medications   Medication Sig Dispense Refill    calcium carbonate 600 mg-vitamin D 400 units (CALTRATE) 600-400 MG-UNIT per tablet Take 1 tablet by mouth daily      Continuous Blood Gluc Sensor (DEXCOM G6 SENSOR) MISC CHANGE EVERY 10 DAYS. 3 each 5    Continuous Blood Gluc Transmit (DEXCOM G6 TRANSMITTER) MISC CHANGE EVERY 3 MONTHS. 1 each 1    DULoxetine (CYMBALTA) 20 MG capsule Take 20 mg by mouth daily      escitalopram (LEXAPRO) 20 MG tablet Take 20 mg by mouth daily      estradiol (ESTRACE) 0.1 MG/GM vaginal cream Place vaginally twice a week PLACE 2 GRAMS VAGINALLY 2 TIMES WEEKLY (Patient taking differently: Place vaginally twice a week PLACE 2 GRAMS VAGINALLY 2 TIMES WEEKLY (Tues & Fri)) 42.5 g 11    famotidine (PEPCID) 40 MG tablet Take 1 tablet (40 mg) by mouth 2 times daily as needed for heartburn 180 tablet 3    FIASP PENFILL 100 UNIT/ML SOCT Inject 0.5-4 Units Subcutaneous 4 times  daily, INJECT with meals and nightly 15 mL 0    gabapentin (NEURONTIN) 300 MG capsule Take 300 mg by mouth nightly as needed      Glucagon (GVOKE HYPOPEN 1-PACK) 1 MG/0.2ML SOAJ Inject 1 mg Subcutaneous once as needed (for hypoglycemia with loss of consciousness) 15 mL 5    glucose 40 % GEL Take 15-30 g by mouth every 15 minutes as needed. 1 Tube 11    hydrOXYzine (ATARAX) 25 MG tablet TAKE 1-2 TABLETS BY MOUTH 2 TIMES DAILY AS NEEDED FOR ANXIETY  0    Insulin Aspart, w/Niacinamide, (FIASP) 100 UNIT/ML SOLN 15 Units by Subcutaneous Infusion route daily Up to 85 units in continuous insulin pump 90 mL 1    Insulin Disposable Pump (OMNIPOD 5 G6 INTRO, GEN 5,) KIT 1 each continuous 1 kit 0    Insulin Disposable Pump (OMNIPOD 5 G6 POD, GEN 5,) MISC 1 each every 3 days 30 each 5    insulin glargine (LANTUS PEN) 100 UNIT/ML pen Inject 6 units daily in the event of pump failure 15 mL 0    insulin pen needle (ULTICARE MICRO) 32G X 4 MM miscellaneous USE TO INJECT 4-6 TIMES DAILY 200 each 0    INSULIN PUMP - OUTPATIENT Inject Subcutaneous continuous Date Last Updated:   Omnipod, type of insulin: Fiasp  Basal Rates in units/hr  0514-1603: 0.15   3007-3908: 0.25  ICF (insulin to carb ratio): 20 (1 unit covers 20g carbs)  ISF (insulin sensitivity factor): 140-170 (1 unit lowers BG by 140-170mg/dL)- very sernsitive  Target B-130 mg/dL  Active Insulin Time: 3 hours      levothyroxine (SYNTHROID/LEVOTHROID) 150 MCG tablet Take 1 tablet (150 mcg) by mouth daily 30 tablet 11    lipase-protease-amylase (VIOKACE) 92312-33613-06097 units TABS tablet Take 5 with meals and 3 with snacks; approximately daily maximum of 20 capsules 500 tablet 11    loratadine (CLARITIN) 10 MG tablet Take 10 mg by mouth daily as needed       omeprazole (PRILOSEC) 40 MG DR capsule Take 1 capsule (40 mg) by mouth 2 times daily Take 30-60 minutes before a meal. 180 capsule 3    potassium citrate (UROCIT-K) 10 MEQ (1080 MG) CR tablet Take 1 tablet (10  mEq) by mouth 3 times daily (with meals) 60 tablet 11    traZODone (DESYREL) 50 MG tablet Take 50 mg by mouth nightly as needed for sleep          ALLERGIES:    Allergies   Allergen Reactions    Corticosteroids Other (See Comments)     All oral, IV and injectable steroids are contraindicated (unless in life threatening situations) in Islet Auto transplant recipients. They can cause irreversible loss of islet cell function. Please contact patient's transplant care coordinator, Erlinda Multani RN BSN at [phone]/pager: [phone] and endocrinologist prior to administration.      Povidone Iodine Hives     Causes skin to blister    Nsaids      naprosyn = GI upset    Sulfasalazine Nausea and Nausea and Vomiting         PHYSICAL EXAM:   video visit  GENERAL APPEARANCE: alert and no distress  RESP: normal work of breathing, no conversational dyspnea  NEURO: mentation intact and speech normal    LABS:   I reviewed:  Electrolytes/Renal -   Recent Labs   Lab Test 07/03/23  0725 07/01/23  0618 06/30/23 2019 04/03/23  1445 01/05/23  1342 02/04/22  0737 02/04/22  0726 02/03/22  2130 02/03/22  2056 02/03/22  0741 02/03/22  0730    139 135*   < > 141   < >  --   --   --   --   --    POTASSIUM 4.1 4.2 4.2   < > 4.2   < > 4.2  --   --   --  3.9   CHLORIDE 106 108* 100   < > 106   < >  --   --   --   --   --    CO2 28 23 24   < > 21*   < >  --   --   --   --   --    BUN 13.4 16.3 22.5   < > 21.9   < >  --   --   --   --   --    CR 0.88 0.90 0.97*   < > 0.94   < >  --   --   --   --   --    * 101* 338*   < > 111*   < >  --    < >  --    < >  --    VALARIE 9.2 8.5* 9.1   < > 9.0   < >  --   --   --   --   --    MAG  --   --   --   --  1.8  --  1.6  --   --   --  1.8   PHOS  --   --   --   --  4.2  --   --   --  2.7  --  1.8*    < > = values in this interval not displayed.       CBC -   Recent Labs   Lab Test 08/11/23  1037 07/03/23  0725 07/01/23  0618   WBC 7.0 8.6 13.2*   HGB 11.4* 10.8* 10.2*   * 478* 347        LFTs -   Recent Labs   Lab Test 06/30/23  2019 04/03/23  1445 01/05/23  1342   ALKPHOS 132* 122* 111*   BILITOTAL 0.3 0.2 0.2   ALT 21 30 49*   AST 23 30 46*   PROTTOTAL 6.9 6.7 6.3*   ALBUMIN 3.8 4.3 4.0       Coags -   Recent Labs   Lab Test 05/10/16  2019   INR 1.03       Iron Panel -   Recent Labs   Lab Test 08/11/23  1037 03/24/23  1325 01/05/23  1342   IRON 38 118 90   IRONSAT 12* 38 29   GRISELDA 51 44 34       Endocrine -   Recent Labs   Lab Test 04/03/23  1445 03/24/23  1325 01/19/23  1304 01/05/23  1342 09/15/22  0948 09/03/21  1239 08/17/21  1131   A1C 6.6*  --  6.5*  --  7.7*   < >  --    TSH  --  0.50  --  6.56*  --   --  2.75    < > = values in this interval not displayed.       IMAGING:  reviewed     Hannah Murcia MD

## 2024-12-03 LAB
A-LINOLENATE SERPL-SCNC: 99 NMOL/ML (ref 50–130)
AA SERPL-SCNC: 1016 NMOL/ML (ref 520–1490)
ARACHIDATE SERPL-SCNC: 31 NMOL/ML (ref 50–90)
CLINICAL BIOCHEMIST REVIEW: ABNORMAL
DHA SERPL-SCNC: 88 NMOL/ML (ref 30–250)
DOCOSAPENTAENATE W6 SERPL-SCNC: 31 NMOL/ML (ref 10–70)
DOCOSATETRAENOATE SERPL-SCNC: 46 NMOL/ML (ref 10–80)
DOCOSENOATE SERPL-SCNC: 8 NMOL/ML (ref 4–13)
DPA SERPL-SCNC: 80 NMOL/ML (ref 20–210)
EPA SERPL-SCNC: 85 NMOL/ML (ref 14–100)
FA SERPL-SCNC: 13.7 MMOL/L (ref 7.3–16.8)
G-LINOLENATE SERPL-SCNC: 163 NMOL/ML (ref 16–150)
HEXADECENOATE SERPL-SCNC: 81 NMOL/ML (ref 25–105)
HOMO-G LINOLENATE SERPL-SCNC: 191 NMOL/ML (ref 50–250)
LAURATE SERPL-SCNC: 34 NMOL/ML (ref 6–90)
LINOLEATE SERPL-SCNC: 2996 NMOL/ML (ref 2270–3850)
MEAD ACID SERPL-SCNC: 72 NMOL/ML (ref 7–30)
MONOUNSAT FA SERPL-SCNC: 4.2 MMOL/L (ref 1.3–5.8)
MYRISTATE SERPL-SCNC: 292 NMOL/ML (ref 30–450)
NERVONATE SERPL-SCNC: 110 NMOL/ML (ref 60–100)
OCTADECANOATE SERPL-SCNC: 816 NMOL/ML (ref 590–1170)
OLEATE SERPL-SCNC: 2892 NMOL/ML (ref 650–3500)
PALMITATE SERPL-SCNC: 3304 NMOL/ML (ref 1480–3730)
PALMITOLEATE SERPL-SCNC: 1016 NMOL/ML (ref 110–1130)
POLYUNSAT FA SERPL-SCNC: 4.9 MMOL/L (ref 3.2–5.8)
SAT FA SERPL-SCNC: 4.6 MMOL/L (ref 2.5–5.5)
TRIENOATE/AA SERPL-SRTO: 0.07 {RATIO} (ref 0.01–0.04)
VACCENATE SERPL-SCNC: 9 NMOL/ML (ref 280–740)
W3 FA SERPL-SCNC: 0.4 MMOL/L (ref 0.2–0.5)
W6 FA SERPL-SCNC: 4.4 MMOL/L (ref 3–5.4)

## 2024-12-03 PROCEDURE — A4221 SUPP NON-INSULIN INF CATH/WK: HCPCS

## 2024-12-17 DIAGNOSIS — K86.89 PANCREATIC INSUFFICIENCY: ICD-10-CM

## 2024-12-18 RX ORDER — PANCRELIPASE 20880; 78300; 78300 [USP'U]/1; [USP'U]/1; [USP'U]/1
TABLET ORAL
Qty: 500 TABLET | Refills: 11 | Status: SHIPPED | OUTPATIENT
Start: 2024-12-18

## 2025-01-15 LAB
A-LINOLENATE SERPL-SCNC: 99 NMOL/ML (ref 50–130)
AA SERPL-SCNC: 1778 NMOL/ML (ref 520–1490)
ARACHIDATE SERPL-SCNC: 31 NMOL/ML (ref 50–90)
CLINICAL BIOCHEMIST REVIEW: ABNORMAL
DHA SERPL-SCNC: 88 NMOL/ML (ref 30–250)
DOCOSAPENTAENATE W6 SERPL-SCNC: 31 NMOL/ML (ref 10–70)
DOCOSATETRAENOATE SERPL-SCNC: 46 NMOL/ML (ref 10–80)
DOCOSENOATE SERPL-SCNC: 8 NMOL/ML (ref 4–13)
DPA SERPL-SCNC: 80 NMOL/ML (ref 20–210)
EPA SERPL-SCNC: 85 NMOL/ML (ref 14–100)
FA SERPL-SCNC: 13.7 MMOL/L (ref 7.3–16.8)
G-LINOLENATE SERPL-SCNC: 163 NMOL/ML (ref 16–150)
HEXADECENOATE SERPL-SCNC: 81 NMOL/ML (ref 25–105)
HOMO-G LINOLENATE SERPL-SCNC: 191 NMOL/ML (ref 50–250)
LAURATE SERPL-SCNC: 34 NMOL/ML (ref 6–90)
LINOLEATE SERPL-SCNC: 2996 NMOL/ML (ref 2270–3850)
MEAD ACID SERPL-SCNC: 72 NMOL/ML (ref 7–30)
MONOUNSAT FA SERPL-SCNC: 4.2 MMOL/L (ref 1.3–5.8)
MYRISTATE SERPL-SCNC: 292 NMOL/ML (ref 30–450)
NERVONATE SERPL-SCNC: 110 NMOL/ML (ref 60–100)
OCTADECANOATE SERPL-SCNC: 816 NMOL/ML (ref 590–1170)
OLEATE SERPL-SCNC: 2892 NMOL/ML (ref 650–3500)
PALMITATE SERPL-SCNC: 3304 NMOL/ML (ref 1480–3730)
PALMITOLEATE SERPL-SCNC: 1016 NMOL/ML (ref 110–1130)
POLYUNSAT FA SERPL-SCNC: 4.9 MMOL/L (ref 3.2–5.8)
SAT FA SERPL-SCNC: 4.6 MMOL/L (ref 2.5–5.5)
TRIENOATE/AA SERPL-SRTO: 0.04 {RATIO} (ref 0.01–0.04)
VACCENATE SERPL-SCNC: 9 NMOL/ML (ref 280–740)
W3 FA SERPL-SCNC: 0.4 MMOL/L (ref 0.2–0.5)
W6 FA SERPL-SCNC: 4.4 MMOL/L (ref 3–5.4)

## 2025-01-23 ENCOUNTER — VIRTUAL VISIT (OUTPATIENT)
Dept: FAMILY MEDICINE | Facility: CLINIC | Age: 60
End: 2025-01-23
Payer: COMMERCIAL

## 2025-01-23 DIAGNOSIS — F51.01 PRIMARY INSOMNIA: Primary | ICD-10-CM

## 2025-01-23 PROBLEM — A41.9 ACUTE SEPSIS (H): Status: RESOLVED | Noted: 2023-06-30 | Resolved: 2025-01-23

## 2025-01-23 PROBLEM — S61.251A: Status: RESOLVED | Noted: 2024-11-01 | Resolved: 2025-01-23

## 2025-01-23 PROBLEM — R82.81 PYURIA: Status: RESOLVED | Noted: 2022-09-19 | Resolved: 2025-01-23

## 2025-01-23 PROBLEM — L03.012 CELLULITIS OF LEFT INDEX FINGER: Status: RESOLVED | Noted: 2024-11-01 | Resolved: 2025-01-23
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
## 2025-01-23 PROBLEM — M65.10 INFECTIOUS TENOSYNOVITIS: Status: RESOLVED | Noted: 2024-11-01 | Resolved: 2025-01-23

## 2025-01-23 ASSESSMENT — PATIENT HEALTH QUESTIONNAIRE - PHQ9
10. IF YOU CHECKED OFF ANY PROBLEMS, HOW DIFFICULT HAVE THESE PROBLEMS MADE IT FOR YOU TO DO YOUR WORK, TAKE CARE OF THINGS AT HOME, OR GET ALONG WITH OTHER PEOPLE: NOT DIFFICULT AT ALL
SUM OF ALL RESPONSES TO PHQ QUESTIONS 1-9: 5
SUM OF ALL RESPONSES TO PHQ QUESTIONS 1-9: 5

## 2025-01-23 NOTE — PROGRESS NOTES
"Lynn is a 59 year old who is being evaluated via a billable video visit.    How would you like to obtain your AVS? MyChart  If the video visit is dropped, the invitation should be resent by: Text to cell phone: [phone]  Will anyone else be joining your video visit? No      Assessment & Plan     Primary insomnia - hard time shutting her brain off. Has been an issue for many years. Prior to Christmas 2024, was able to sleep decent with trazodone and GBP at bedtime. Since then, unable to fall asleep and stay asleep. No stressful events. 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 Suggested she reset her sleep-wake cycle with melatonin. Can also try her hydroxyzine to help her fall asleep. She doesn't want to increase trazodone due to increased grogginess in the AM. She will contact me if we need to talk about prescription options for sleep.     BMI  Estimated body mass index is 24.03 kg/m  as calculated from the following:    Height as of 11/1/24: 1.626 m (5' 4\").    Weight as of 11/21/24: 63.5 kg (139 lb 15.9 oz).         Subjective   Lynn is a 59 year old, presenting for the following health issues:  Sleep Problem (Troubles falling asleep- can't turn her brain off.  Will take her Gabapentin at night with 100mg of Trazodone and most nights it doesn't do anything. )        1/23/2025    10:12 AM   Additional Questions   Roomed by Kavitha Bowers CMA   Accompanied by Self     Video Start Time: 10:30 AM    History of Present Illness       Reason for visit:  Trouble with sleeping   She is taking medications regularly.       Medication Follow Up for Sleep  Taking Medication as prescribed: yes  Side Effects:  None  Medication Helping Symptoms:  NO        Review of Systems  Constitutional, HEENT, cardiovascular, pulmonary, gi and gu systems are negative, except as otherwise noted.      Objective    Vitals - Patient Reported  Weight (Patient Reported): 59 kg (130 lb)  Height (Patient Reported): " "162.6 cm (5' 4\")  BMI (Based on Pt Reported Ht/Wt): 22.31  Pain Score: No Pain (0)        Physical Exam   GENERAL: alert and no distress  EYES: Eyes grossly normal to inspection.  No discharge or erythema, or obvious scleral/conjunctival abnormalities.  RESP: No audible wheeze, cough, or visible cyanosis.    SKIN: Visible skin clear. No significant rash, abnormal pigmentation or lesions.  NEURO: Cranial nerves grossly intact.  Mentation and speech appropriate for age.  PSYCH: Appropriate affect, tone, and pace of words        Video-Visit Details    Type of service:  Video Visit   Video End Time:10:53 AM  Originating Location (pt. Location): Home    Distant Location (provider location):  Off-site  Platform used for Video Visit: Lizbeth  Signed Electronically by: Maryana Brooks MD    "

## 2025-01-29 DIAGNOSIS — E89.1 POST-PANCREATECTOMY DIABETES (H): ICD-10-CM

## 2025-01-29 DIAGNOSIS — Z90.410 POST-PANCREATECTOMY DIABETES (H): ICD-10-CM

## 2025-01-29 DIAGNOSIS — E13.9 POST-PANCREATECTOMY DIABETES (H): ICD-10-CM

## 2025-01-29 RX ORDER — INSULIN PMP CART,AUT,G6/7,CNTR
EACH SUBCUTANEOUS
Qty: 30 EACH | Refills: 5 | Status: SHIPPED | OUTPATIENT
Start: 2025-01-29

## 2025-03-20 ENCOUNTER — OFFICE VISIT (OUTPATIENT)
Dept: TRANSPLANT | Facility: CLINIC | Age: 60
End: 2025-03-20
Attending: PEDIATRICS
Payer: COMMERCIAL

## 2025-03-20 ENCOUNTER — ANCILLARY PROCEDURE (OUTPATIENT)
Dept: BONE DENSITY | Facility: CLINIC | Age: 60
End: 2025-03-20
Attending: PEDIATRICS
Payer: COMMERCIAL

## 2025-03-20 ENCOUNTER — TELEPHONE (OUTPATIENT)
Dept: TRANSPLANT | Facility: CLINIC | Age: 60
End: 2025-03-20

## 2025-03-20 VITALS
SYSTOLIC BLOOD PRESSURE: 126 MMHG | WEIGHT: 150.6 LBS | RESPIRATION RATE: 16 BRPM | DIASTOLIC BLOOD PRESSURE: 75 MMHG | OXYGEN SATURATION: 97 % | HEART RATE: 85 BPM | TEMPERATURE: 98 F | BODY MASS INDEX: 25.85 KG/M2

## 2025-03-20 DIAGNOSIS — K86.81 EXOCRINE PANCREATIC INSUFFICIENCY: ICD-10-CM

## 2025-03-20 DIAGNOSIS — Z98.84 GASTRIC BYPASS STATUS FOR OBESITY: ICD-10-CM

## 2025-03-20 DIAGNOSIS — E13.9 POST-PANCREATECTOMY DIABETES (H): Primary | ICD-10-CM

## 2025-03-20 DIAGNOSIS — Z78.0 ASYMPTOMATIC MENOPAUSAL STATE: ICD-10-CM

## 2025-03-20 DIAGNOSIS — Z90.410 POST-PANCREATECTOMY DIABETES (H): Primary | ICD-10-CM

## 2025-03-20 DIAGNOSIS — E89.1 POST-PANCREATECTOMY DIABETES (H): Primary | ICD-10-CM

## 2025-03-20 LAB
EST. AVERAGE GLUCOSE BLD GHB EST-MCNC: 143 MG/DL
HBA1C MFR BLD: 6.6 %

## 2025-03-20 PROCEDURE — 99213 OFFICE O/P EST LOW 20 MIN: CPT | Performed by: PEDIATRICS

## 2025-03-20 PROCEDURE — 77080 DXA BONE DENSITY AXIAL: CPT | Performed by: INTERNAL MEDICINE

## 2025-03-20 NOTE — PROGRESS NOTES
[facility]  Islet Autotransplant, Diabetes Follow Up    Problem List:  Patient Active Problem List   Diagnosis    Iron deficiency anemia secondary to inadequate dietary iron intake    Gastric bypass status for obesity    Chronic pain syndrome    Exocrine pancreatic insufficiency    Asplenia    BLU (obstructive sleep apnea)    H/O of rectopexy    Dysthymia    Anxiety    Thyroid nodule    Acquired hypothyroidism    Post-pancreatectomy diabetes (H)    Other iron deficiency anemia    Urge incontinence    Urinary urgency    High-tone pelvic floor dysfunction    Recurrent kidney stones    Vaginal atrophy    Enteric hyperoxaluria    Hypocitraturia    S/P partial hysterectomy    Generalized abdominal pain    Diarrhea, unspecified type    Primary insomnia         Assessment:  1.  Post pancreatectomy diabetes mellitus, s/p total pancreatectomy and islet autotransplant.  (ICD-10 E89.1)  2.  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  Rapid gastric emptying-- must use Viokace as she does not respond to enteric coated enzymes  4.  Concern for recurring cholangitis (keep on list for FMT options when available)  5.  Mild essential fatty acid deficinecy related to EPIDanie Bailey is a 60year 1month old  with history of gastric bypass, chronic pancreatitis who is s/p total pancreatectomy and islet autotransplant.  She was insulin independent until 6/6/2017 (just after 4 years post-tx) and now has partial islet function.    She is on Fiasp due to rapid gastric emptying and post meal hypoglycemia.  She is on Viokace because she only responds to non enteric coated enzymes and otherwise has severe malabsorption.  Other pertinent TPIAT hx includes past febrile cholangitis and other endocrine hx includes acquired hypothyroidism on levothyroxine.    Overall her glycemic control is at goal today. She does have some mild post meal  hyperglycemia but then also has hypoglycemia 3-4 hours post meal so we are not going to change I:C ratios today.  The one acute issue addressed in clinic is that her OP5 PDM will no longer charge, and is at only 8%. She has a new PDM from [facility] scheduled to arrive tomorrow. However since she is on G6 Dexcom, we also have the option to start the OP5 arun on iPhone.  We downloaded this together in clinic.  I provided settings and also online resources are available.  She'll get this set up as soon as she gets home with a new pod.  She has a Rx for basal insulin at pharmacy and can take 8 units back up dosing of long-acting if for whatever reason she can't get on the arun and needs basal until the PDM arrives tomorrow (but we don't think that will be needed at this point).     DXA done today- results pending.       Plan:  1.  Changes to current diabetes regimen:  Patient Instructions   1)  PUMP SETTINGS  Basal rates:  12a 0.15 u/hr, 8a 0.35 u/hr  Bolus  I:C 8g   mg/dL  Target 110 (150) mg/dL  Active insulin time is 4 hours  Reverse correction is on  Max basal is at 1 u/hr and max bolus is 6 units.  Total daily dose = 18 units.     2)  We are going to change over to OP5 iphone arun.       Lab Results   Component Value Date    A1C 6.6 03/20/2025    A1C 6.8 11/21/2024    A1C 6.6 08/23/2024    A1C 6.7 03/03/2024    A1C 7.1 09/07/2023    A1C 6.6 04/03/2023    A1C 6.8 05/10/2021    A1C 6.9 02/16/2021    A1C 6.7 08/20/2020    A1C 7.1 06/05/2020    A1C 6.9 02/06/2020           2.  Frequency of blood sugar checks:  premeal and bedtime, and as needed for hypoglycemia (6 strip per day).    3.  Continue routine follow up for autoislet transplant patients:  Mixed meal test (6 mL/kg BoostHP to max of 360 mL) at 3 months, 6 months, and once a year post transplant.  Hemoglobin A1c levels at these time points and quarterly.    4.  Other issues addressed today:  As above    Follow up:  As above        Contact me for questions at  [phone] or [phone].        Dr. Adriana Robles MD  [facility] [place]  Director of Research, Islet Auto-transplant Program  Phone:  [phone]  Electronically signed: March 20, 2025 at 3:25 PM        Review of the result(s) of each unique test - CGM, pump  Prescription drug management  40 minutes spent by me on the date of the encounter doing chart review, history and exam, documentation and further activities per the note   The longitudinal plan of care for the diagnosis(es)/condition(s) as documented were addressed during this visit. Due to the added complexity in care, I will continue to support Lynn in the subsequent management and with ongoing continuity of care.          HPI:  Lynn is a 60 year old female here for follow up o total pancreatectomy, islet cell autotransplant, splenectomy, liver biopsy (needle core), choledochoduodenostomy, feeding jejunostomy performed on 5/10/2013.  At the time of the procedure, the patient received 178,100 IEQ, or 3,209 IEQ/kg body weight. She has had two subsequent exploratory laparatomy for small bowel obstruction. Other notable endocrine history includes a complex cystic nodule in mid right thyroid lobe with 1 cm maximum diameter (saw Dr Adorno in 11/2016) and mild hypothyroidism followed by PCP, pathology in 2018 by [facility] showed a benign nodule (includes adenomatoid nodule, colloid nodule, etc.).  She had an episode of cholangitis and was hospitalized for 4 days 8/24/17 (fevers, RUQ pain, + Ecoli blood cx).  She had another similar episode in March 20204.     She was on NO insulin at her 1 year, 2 year, 3 year, 4 year transplant anniversaries and restart insulin just after 4 years post-tx, insulin restart date of  6/6/2017.   She had a slow opioid wean off suboxone but eventually did come off.     Other history notable for surgical procedure Admitted from 2/1- 2/5/22 for  1. Posterior suture rectopexy  (Colorectal primary)  2. Sigmoidoscopy (Colorectal primary)  3. Supracervical hysterectomy with bilateral salpingooophrectomy (Uro)  4. Uterosacral ligament suspension (Uro)  5. Gan colposuspension (Uro)  6. Cystoscopy (Uro)      Interval history:  -  Doing well in general.  Hand has completely healed.     1)  Diabetes data:  Lynn continues to have partial islet graft function.   She is on Fiasp right before meal time and has rapid gastric emptying.  Biggest issue today is that her PDM won't charge.  It is at 8%. She called [facility] who will send a new PDM.  She is on Dexcom G6 and has an iPhone.  So we downloaded the OP5 controller on iPhone and she will start this and save PDM as back up.  Does have glargine called into local pharmacy as well.   A1c. At goal at 6.6%  Complicating factors:  TANGELA, has had 11 rescue puppies with no dog mom so she has been feeding around the clock.     Pump, settings below.    Basal Rates:  12a 0.15 unit(s)/hr  8a 0.35 units/hr      Target Glucose  12a 110 (150)      Sensitivity   12a 100 mg/dL     Insulin to Carb Ratio  12 am 8 grams     Active insulin time 4 hours  Reverse correction off    Total daily insulin dose= 18.1 units of Fiasp, of which 7.8 units per day is basal on pump, and reaminder is bolus.                         HbA1c levels:  Lab Results   Component Value Date    A1C 6.8 11/21/2024    A1C 6.6 08/23/2024    A1C 6.7 03/03/2024    A1C 7.1 09/07/2023    A1C 6.6 04/03/2023    A1C 6.8 05/10/2021    A1C 6.9 02/16/2021    A1C 6.7 08/20/2020    A1C 7.1 06/05/2020    A1C 6.9 02/06/2020       Hypoglycemia history:   Recent history as above.  The patient has had NO episodes of severe hypoglycemia (seizure, coma, or neuroglycopenic symptoms severe enough to require assistance from another person).  Blood sugars were reviewed from the patient records and/or the meter download.        Patient is good candidate for CGM therapy.  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   --  Require frequent adjustments of the insulin treatment regimen, based on therapeutic CGM test results      2)  Nutrition./PERT: Needs Viokace (due to gastric anatomy, rapid emptying).      3)  Thyroid:  On levothyroxine - recent labs below - now at 125 mcg daily.   TSH   Date Value Ref Range Status   11/21/2024 0.25 (L) 0.30 - 4.20 uIU/mL Final   08/17/2021 2.75 0.40 - 4.00 mU/L Final   03/15/2021 2.93 0.40 - 4.00 mU/L Final     T4 Free   Date Value Ref Range Status   06/05/2020 1.05 0.76 - 1.46 ng/dL Final     Free T4   Date Value Ref Range Status   11/21/2024 1.65 0.90 - 1.70 ng/dL Final     Review of systems:  A complete Review Of Systems was performed but was negative except as noted in the HPI.    Past Medical and Surgical History:  Past Medical History:   Diagnosis Date    Benign paroxysmal positional vertigo     occ.     Calculus of kidney 05/2005    x1 on L side passed, several stones.  Has been tested for oxalate.    Chronic abdominal pain 07/17/2013    Chronic pancreatitis 07/17/2013    Depression     also occ panic spells    Diabetes (H) 5/10/2013    Total Pancreatomy with Auto Islets Transplant    Dyspepsia 06/1999    H. pylori   treated    Dysthymia 03/01/2016    Headaches     still periodic HA's ;  often 5X/week    Hypertension 02/22/2016    Stress related    Iron deficiency anemia 11/2003    relates to gastric bypass    Post-pancreatectomy diabetes melltius 05/17/2013    Sleep apnea     uses splint    Spasm of sphincter of Oddi     surgical + endoscopic stenting of pancreatic duct @ [facility] [place] 5[place]23/06    Thyroid  nodule 2016     Past Surgical History:   Procedure Laterality Date    ABDOMINOPLASTY  2002    Tummy tuck    APPENDECTOMY  1990    BUNIONECTOMY Right 1998    CBD Stent placement  2002    CBD stent; Dr. Presley     SECTION      CHOLECYSTECTOMY      COLONOSCOPY N/A 2021    Procedure: COLONOSCOPY INCOMPLETE Aborted due to incomplete prep  will need to take additional prep and return tomorrow 21;  Surgeon: Ihsan Saenz MD;  Location:  GI    COMBINED CYSTOSCOPY, RETROGRADES, URETEROSCOPY, LASER HOLMIUM LITHOTRIPSY URETER(S), INSERT STENT Right 2015    Procedure: COMBINED CYSTOSCOPY, RETROGRADES, URETEROSCOPY, LASER HOLMIUM LITHOTRIPSY URETER(S), INSERT STENT;  Surgeon: Kennedi Aldana MD;  Location: [facility] OR    COMBINED CYSTOSCOPY, RETROGRADES, URETEROSCOPY, LASER HOLMIUM LITHOTRIPSY URETER(S), INSERT STENT Right 2015    Procedure: COMBINED CYSTOSCOPY, RETROGRADES, URETEROSCOPY, LASER HOLMIUM LITHOTRIPSY URETER(S), INSERT STENT;  Surgeon: Kennedi Aldana MD;  Location: [facility] OR    COSMETIC SURGERY  2002    Tummy tuck    CYSTECTOMY OVARIAN BENIGN Right     CYSTOSCOPY, RETROGRADES, INSERT STENT URETER(S), COMBINED  10/02/2012    Procedure: COMBINED CYSTOSCOPY, RETROGRADES, INSERT STENT URETER(S);  COMBINED CYSTOSCOPY,  , INSERT LEFT STENT URETER;  Surgeon: Johny Baez MD;  Location: [facility] OR    CYSTOSCOPY, RETROGRADES, INSERT STENT URETER(S), COMBINED Left 2022    Procedure: Cystoscopy with left retrograde pyelogram, left ureteroscopy, thulium laser lithotripsy of left ureteral calculus, stone basketing and left ureteral stent insertion;  Surgeon: Alonzo Rome MD;  Location: [facility] OR    ESOPHAGOSCOPY, GASTROSCOPY, DUODENOSCOPY (EGD), COMBINED N/A 2018    Procedure: COMBINED ESOPHAGOSCOPY, GASTROSCOPY, DUODENOSCOPY (EGD);  ESOPHAGOSCOPY, GASTROSCOPY, DUODENOSCOPY (EGD)    ;  Surgeon: Tamir Rodgers MD;  Location:  GI     EXTRACORPOREAL SHOCK WAVE LITHOTRIPSY (ESWL)  10/16/2012    Procedure: EXTRACORPOREAL SHOCK WAVE LITHOTRIPSY (ESWL);  left EXTRACORPOREAL SHOCK WAVE LITHOTRIPSY (ESWL) ;  Surgeon: Johny Baez MD;  Location: [facility] OR    Gastric bypass NOS      HERNIA REPAIR  02/2015    HYSTERECTOMY SUPRACERVICAL, BILATERAL SALPINGO-OOPHORECTOMY, COMBINED N/A 02/01/2022    Procedure: Abdominal supracervical hysterectomy, bilateral salpingooophrectomy;  Surgeon: Nicole Rivera MD;  [facility]    INCISION AND DRAINAGE FINGER, COMBINED Left 11/2/2024    Procedure: Left index finger incision and debridement to the level of bone, dorsally, 2cm2. Left index finger proximal interphalangial joint irrigation. Left index finger incision and debridement to the level of the flexor tendon sheath, 3 cm2.;  Surgeon: Aileen Reza MD;  Location: [facility] OR    IRRIGATION AND DEBRIDEMENT HAND, COMBINED Left 10/30/2020    Procedure: Left hand sharp excisional debridement of skin, subcutaneous tissue and fat with a scalpel, 2 x 1 x 1 cm.;  Surgeon: Demian Renteria MD;  Location: [facility]    LAPAROSCOPIC LYSIS ADHESIONS N/A 02/20/2015    Procedure: LAPAROSCOPIC LYSIS ADHESIONS;  Surgeon: Aaron Early MD;  [facility]    LAPAROSCOPIC LYSIS ADHESIONS N/A 12/29/2015    Procedure: LAPAROSCOPIC LYSIS ADHESIONS;  Surgeon: Aaron Early MD;  [facility]    PANCREATECTOMY, TRANSPLANT AUTO ISLET CELL, COMBINED  05/10/2013    Procedure: COMBINED PANCREATECTOMY, TRANSPLANT AUTO ISLET CELL;  Pancreatectomy, Auto Islet Cell Transplant   hernia repair, jejunostomy tube and liver biopsies with Anesthesia General with block;  Surgeon: Aaron Early MD;  [facility]    Partial ileum resection  1992    RECTOPEXY ABDOMINAL N/A 02/01/2022    Procedure: RECTOPEXY, ABDOMINAL;  Surgeon: Uriah Sheridan MD;  [facility]    RELEASE TRIGGER FINGER Left 11/2/2024    Procedure: Left index finger A1 pulley release.;  Surgeon:  Aileen Reza MD;  Location: [facility] OR    REPAIR PTOSIS BROW BILATERAL Bilateral 2020    Procedure: BILATERAL BROW PTOSIS REPAIR;  Surgeon: Denise Alberts MD;  Location: [facility] OR    SACROCOLPOPEXY, CYSTOSCOPY, COMBINED N/A 2022    Procedure: Uterosacral ligament suspension, pina colposuspension with Cystoscopy;  Surgeon: Nicole Rivera MD;  [facility]    SIGMOIDOSCOPY FLEXIBLE N/A 2022    Procedure: SIGMOIDOSCOPY, FLEXIBLE;  Surgeon: Uriah Sheridan MD;  [facility]    Surgery for SBO      TONSILLECTOMY, ADENOIDECTOMY, COMBINED  1997    TRANSPLANT  5/10/13    Pancreatic Auto-Islet Transplant       Family History:  New changes since last visit:  none  Family History   Problem Relation Age of Onset    Family History Negative Mother     Respiratory Father         COPD;  at 69    Genitourinary Problems Father         kidney stones    Substance Abuse Father     Depression Father     Asthma Father     Heart Disease Paternal Grandfather         M.I.    Coronary Artery Disease Paternal Grandfather     Hyperlipidemia Paternal Grandfather     Genitourinary Problems Brother         multiple brothers with kidney stones    Gastrointestinal Disease Maternal Grandmother         undiagnosed 'gut' issues    Coronary Artery Disease Maternal Grandfather     Hyperlipidemia Maternal Grandfather     Cerebrovascular Disease Paternal Grandmother         At the age of 103    Anxiety Disorder Paternal Grandmother     Osteoporosis Paternal Grandmother     Anxiety Disorder Son     Anxiety Disorder Daughter     Asthma Daughter     Colon Cancer No family hx of        Social History:  Social History     Social History Narrative    Not on file     Does not work currently.  Mom to many foster dogs     Physical Exam:  Vitals: /75 (BP Location: Right arm, Patient Position: Chair, Cuff Size: Adult Regular)   Pulse 85   Temp 98  F (36.7  C) (Oral)   Resp 16   Wt 68.3 kg (150 lb  9.6 oz)   [facility] 12/19/2013   SpO2 97%   BMI 25.85 kg/m    BMI= Body mass index is 25.85 kg/m .  General:  Appearance is normal, no acute distress  HEENT:  NC/AT, sclera appear white  Neck:  No thyroid nodule palpable.   Neck:  No obvious thyromegaly  CV/Lungs:  Non distressed breathing  Skin:  No apparent rashes.   Neuro:  Normal mental status  Psych:  Normal affect    Results.  Lab Results   Component Value Date    A1C 6.6 03/20/2025    A1C 6.8 11/21/2024    A1C 6.6 08/23/2024    A1C 6.7 03/03/2024    A1C 7.1 09/07/2023    A1C 6.6 04/03/2023    A1C 6.8 05/10/2021    A1C 6.9 02/16/2021    A1C 6.7 08/20/2020    A1C 7.1 06/05/2020    A1C 6.9 02/06/2020       Mixed Meal Test:  C Peptide   Date Value Ref Range Status   11/21/2024 2.5 0.9 - 6.9 ng/mL Final     Comment:     Reference range applies to fasting specimens.   11/21/2024 1.9 0.9 - 6.9 ng/mL Final     Comment:     Reference range applies to fasting specimens.   11/21/2024 0.5 (L) 0.9 - 6.9 ng/mL Final     Comment:     Reference range applies to fasting specimens.   09/07/2023 2.2 0.9 - 6.9 ng/mL Final   09/07/2023 1.8 0.9 - 6.9 ng/mL Final   08/20/2020 3.4 0.9 - 6.9 ng/mL Final   08/20/2020 1.5 0.9 - 6.9 ng/mL Final   08/20/2020 0.5 (L) 0.9 - 6.9 ng/mL Final   05/16/2019 1.8 0.9 - 6.9 ng/mL Final   05/16/2019 1.5 0.9 - 6.9 ng/mL Final     Glucose   Date Value Ref Range Status   11/21/2024 249 (H) 70 - 99 mg/dL Final   11/21/2024 335 (H) 70 - 99 mg/dL Final   11/21/2024 137 (H) 70 - 99 mg/dL Final   06/14/2022 104 (H) 70 - 99 mg/dL Final   04/16/2022 241 (H) 70 - 99 mg/dL Final   02/01/2022 90 70 - 99 mg/dL Final   01/18/2022 123 (H) 70 - 99 mg/dL Final   11/26/2021 139 (H) 70 - 99 mg/dL Final   05/10/2021 169 (H) 70 - 99 mg/dL Final   03/01/2021 141 (H) 70 - 99 mg/dL Final   02/28/2021 120 (H) 70 - 99 mg/dL Final   02/16/2021 106 (H) 70 - 99 mg/dL Final   10/30/2020 126 (H) 70 - 99 mg/dL Final     GLUCOSE BY METER POCT   Date Value Ref Range Status   11/05/2024  110 (H) 70 - 99 mg/dL Final   11/05/2024 107 (H) 70 - 99 mg/dL Final   11/05/2024 115 (H) 70 - 99 mg/dL Final     Comment:     /RN Notified   11/04/2024 121 (H) 70 - 99 mg/dL Final   11/04/2024 117 (H) 70 - 99 mg/dL Final       Hemoglobin A1c  Lab Results   Component Value Date    A1C 6.8 11/21/2024    A1C 6.6 08/23/2024    A1C 6.7 03/03/2024    A1C 7.1 09/07/2023    A1C 6.6 04/03/2023    A1C 6.8 05/10/2021    A1C 6.9 02/16/2021    A1C 6.7 08/20/2020    A1C 7.1 06/05/2020    A1C 6.9 02/06/2020       Liver enzymes  Lab Results   Component Value Date    AST 43 11/21/2024    AST 24 05/10/2021     Lab Results   Component Value Date    ALT 42 11/21/2024    ALT 35 05/10/2021     Lab Results   Component Value Date    BILICONJ 0.0 05/12/2014      Lab Results   Component Value Date    BILITOTAL 0.5 11/21/2024    BILITOTAL 0.3 05/10/2021     Lab Results   Component Value Date    ALBUMIN 4.1 11/21/2024    ALBUMIN 3.2 04/16/2022    ALBUMIN 3.4 05/10/2021     Lab Results   Component Value Date    PROTTOTAL 7.0 11/21/2024    PROTTOTAL 6.9 05/10/2021      Lab Results   Component Value Date    ALKPHOS 110 11/21/2024    ALKPHOS 132 05/10/2021       Creatinine:  Creatinine   Date Value Ref Range Status   11/21/2024 1.07 (H) 0.51 - 0.95 mg/dL Final   05/10/2021 0.81 0.52 - 1.04 mg/dL Final   ]    Complete Blood Count:  CBC RESULTS:   Recent Labs   Lab Test 11/21/24  1001   WBC 7.2   RBC 4.16   HGB 12.4   HCT 38.0   MCV 91   MCH 29.8   MCHC 32.6   RDW 13.8          Cholesterol  Lab Results   Component Value Date    CHOL 205 11/21/2024    CHOL 192 09/17/2020     Lab Results   Component Value Date    HDL 69 11/21/2024    HDL 91 09/17/2020     Lab Results   Component Value Date     11/21/2024    LDL 84 09/17/2020     Lab Results   Component Value Date    TRIG 175 11/21/2024    TRIG 83 09/17/2020     Lab Results   Component Value Date    CHOLHDLRATIO 1.9 11/20/2014       Vitamin and Iron studies  Component      Latest Ref  [facility] 11/21/2024  10:01 AM   Vitamin A      0.30 - 1.20 mg/L 0.45    Retinol Palmitate      0.00 - 0.10 mg/L <0.02    Vitamin A Interp Normal    25 OH Vit D2      ug/L <5    25 OH Vit D3      ug/L 91    25 OH Vit D total      20 - 75 ug/L <96 (H)    Iron      37 - 145 ug/dL 84    Iron Binding Capacity      240 - 430 ug/dL 272    Iron Sat Index      15 - 46 % 31    Vitamin E      5.5 - 18.0 mg/L 7.1    Vitamin E Gamma      0.0 - 6.0 mg/L 1.2    Vitamin B12      232 - 1,245 pg/mL 374    Ferritin      11 - 328 ng/mL 177    Zinc      60.0 - 120.0 ug/dL 84.3       Legend:  (H) High    Component      Latest Ref [facility] 11/21/2024  10:01 AM   Lauric Acid C12 0 Test      6 - 90 nmol/mL 34    Myristic Acid C14 0 Test      30 - 450 nmol/mL 292    Hexadecenoic Acid Test      25 - 105 nmol/mL 81    Palmitoleic Acid Test      110 - 1130 nmol/mL 1016    Palmitic Acid Test      1480 - 3730 nmol/mL 3304    G Linolenic Acid Test      16 - 150 nmol/mL 163 (H)    A linolenic Acid Test      50 - 130 nmol/mL 99    Linoleic Acid Test      2270 - 3850 nmol/mL 2996    Oleic Acid Test      650 - 3500 nmol/mL 2892    Vaccenic Acid Test      280 - 740 nmol/mL 9 (L)    Stearic Acid Test      590 - 1170 nmol/mL 816    EPA C20 5W3 Test      14 - 100 nmol/mL 85    Arachidonic Acid Test      520 - 1490 nmol/mL 1778 (H) (C)   Bern Acid Test      7 - 30 nmol/mL 72 (H)    H G Linolenic Test      50 - 250 nmol/mL 191    Arachidic Acid Test      50 - 90 nmol/mL 31 (L)    DHA C22 6W3 Test      30 - 250 nmol/mL 88    DPA C22 5W6 Test      10 - 70 nmol/mL 31    DPA C22 5W3 Test      20 - 210 nmol/mL 80    DTA C22 4W6 Test      10 - 80 nmol/mL 46    Docosenoic Acid Test      4 - 13 nmol/mL 8    Nervonic Acid Test      60 - 100 nmol/mL 110 (H)    Triene Tetraene Ratio Test      0.010 - 0.038  0.040 (H) (C)   Total Saturated Acid Test      2.5 - 5.5 mmol/L 4.6    Total Monounsat Acid Test      1.3 - 5.8 mmol/L 4.2    Total Polyunsat Acid Test      3.2 - 5.8 mmol/L  4.9    Total W3 Test      0.2 - 0.5 mmol/L 0.4    Total W6 Test      3.0 - 5.4 mmol/L 4.4    Total Fatty Acids Test      7.3 - 16.8 mmol/L 13.7    Interpretation Test SEE NOTE       Legend:  (H) High  (L) Low  (C) Corrected

## 2025-03-20 NOTE — TELEPHONE ENCOUNTER
Patient stated she has appointment today and didn't see a lab appointment, writer scheduled lab appointment at the [address] [place] [address], patient is needing lab orders to be entered into epic appointment is at 1 pm

## 2025-03-20 NOTE — PATIENT INSTRUCTIONS
1)  PUMP SETTINGS  Basal rates:  12a 0.15 u/hr, 8a 0.35 u/hr  Bolus  I:C 8g   mg/dL  Target 110 (150) mg/dL  Active insulin time is 4 hours  Reverse correction is on  Max basal is at 1 u/hr and max bolus is 6 units.  Total daily dose = 18 units.     2)  We are going to change over to [employer] arun.       Lab Results   Component Value Date    A1C 6.6 03/20/2025    A1C 6.8 11/21/2024    A1C 6.6 08/23/2024    A1C 6.7 03/03/2024    A1C 7.1 09/07/2023    A1C 6.6 04/03/2023    A1C 6.8 05/10/2021    A1C 6.9 02/16/2021    A1C 6.7 08/20/2020    A1C 7.1 06/05/2020    A1C 6.9 02/06/2020

## 2025-03-20 NOTE — NURSING NOTE
"Chief Complaint   Patient presents with    Follow Up     Return auto islet       Vital signs:  Temp: 98  F (36.7  C) Temp src: Oral BP: 126/75 Pulse: 85   Resp: 16 SpO2: 97 %       Weight: 68.3 kg (150 lb 9.6 oz)  Estimated body mass index is 25.85 kg/m  as calculated from the following:    Height as of 11/1/24: 1.626 m (5' 4\").    Weight as of this encounter: 68.3 kg (150 lb 9.6 oz).      Gabriel Love RN on 3/20/2025 at 2:36 PM    "

## 2025-03-20 NOTE — LETTER
3/20/2025      Lynn Thompson  [address] [facility] [address]      Dear Colleague,    Thank you for referring your patient, Lynn Thompson, to the [facility]. Please see a copy of my visit note below.      [facility]  Islet Autotransplant, Diabetes Follow Up    Problem List:  Patient Active Problem List   Diagnosis     Iron deficiency anemia secondary to inadequate dietary iron intake     Gastric bypass status for obesity     Chronic pain syndrome     Exocrine pancreatic insufficiency     Asplenia     BLU (obstructive sleep apnea)     H/O of rectopexy     Dysthymia     Anxiety     Thyroid nodule     Acquired hypothyroidism     Post-pancreatectomy diabetes (H)     Other iron deficiency anemia     Urge incontinence     Urinary urgency     High-tone pelvic floor dysfunction     Recurrent kidney stones     Vaginal atrophy     Enteric hyperoxaluria     Hypocitraturia     S/P partial hysterectomy     Generalized abdominal pain     Diarrhea, unspecified type     Primary insomnia         Assessment:  1.  Post pancreatectomy diabetes mellitus, s/p total pancreatectomy and islet autotransplant.  (ICD-10 E89.1)  2.  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  Rapid gastric emptying-- must use Viokace as she does not respond to enteric coated enzymes  4.  Concern for recurring cholangitis (keep on list for FMT options when available)  5.  Mild essential fatty acid deficinecy related to EPI.     Lynn is a 60year 1month old  with history of gastric bypass, chronic pancreatitis who is s/p total pancreatectomy and islet autotransplant.  She was insulin independent until 6/6/2017 (just after 4 years post-tx) and now has partial islet function.    She is on Fiasp due to rapid gastric emptying and post meal hypoglycemia.  She is on Viokace because she only responds to non enteric coated enzymes  and otherwise has severe malabsorption.  Other pertinent TPIAT hx includes past febrile cholangitis and other endocrine hx includes acquired hypothyroidism on levothyroxine.    Overall her glycemic control is at goal today. She does have some mild post meal hyperglycemia but then also has hypoglycemia 3-4 hours post meal so we are not going to change I:C ratios today.  The one acute issue addressed in clinic is that her OP5 PDM will no longer charge, and is at only 8%. She has a new PDM from [facility] scheduled to arrive tomorrow. However since she is on G6 Dexcom, we also have the option to start the OP5 arun on iPhone.  We downloaded this together in clinic.  I provided settings and also online resources are available.  She'll get this set up as soon as she gets home with a new pod.  She has a Rx for basal insulin at pharmacy and can take 8 units back up dosing of long-acting if for whatever reason she can't get on the arun and needs basal until the PDM arrives tomorrow (but we don't think that will be needed at this point).     DXA done today- results pending.       Plan:  1.  Changes to current diabetes regimen:  Patient Instructions   1)  PUMP SETTINGS  Basal rates:  12a 0.15 u/hr, 8a 0.35 u/hr  Bolus  I:C 8g   mg/dL  Target 110 (150) mg/dL  Active insulin time is 4 hours  Reverse correction is on  Max basal is at 1 u/hr and max bolus is 6 units.  Total daily dose = 18 units.     2)  We are going to change over to OP5 iphone arun.       Lab Results   Component Value Date    A1C 6.6 03/20/2025    A1C 6.8 11/21/2024    A1C 6.6 08/23/2024    A1C 6.7 03/03/2024    A1C 7.1 09/07/2023    A1C 6.6 04/03/2023    A1C 6.8 05/10/2021    A1C 6.9 02/16/2021    A1C 6.7 08/20/2020    A1C 7.1 06/05/2020    A1C 6.9 02/06/2020           2.  Frequency of blood sugar checks:  premeal and bedtime, and as needed for hypoglycemia (6 strip per day).    3.  Continue routine follow up for autoislet transplant patients:  Mixed meal test  (6 mL/kg BoostHP to max of 360 mL) at 3 months, 6 months, and once a year post transplant.  Hemoglobin A1c levels at these time points and quarterly.    4.  Other issues addressed today:  As above    Follow up:  As above        Contact me for questions at [phone] or [phone].        Dr. Adriana Robles MD  [facility]  Director of Research, Islet Auto-transplant Program  Phone:  [phone]  Electronically signed: March 20, 2025 at 3:25 PM        Review of the result(s) of each unique test - CGM, pump  Prescription drug management  40 minutes spent by me on the date of the encounter doing chart review, history and exam, documentation and further activities per the note   The longitudinal plan of care for the diagnosis(es)/condition(s) as documented were addressed during this visit. Due to the added complexity in care, I will continue to support Lynn in the subsequent management and with ongoing continuity of care.          HPI:  Lynn is a 60 year old female here for follow up o total pancreatectomy, islet cell autotransplant, splenectomy, liver biopsy (needle core), choledochoduodenostomy, feeding jejunostomy performed on 5/10/2013.  At the time of the procedure, the patient received 178,100 IEQ, or 3,209 IEQ/kg body weight. She has had two subsequent exploratory laparatomy for small bowel obstruction. Other notable endocrine history includes a complex cystic nodule in mid right thyroid lobe with 1 cm maximum diameter (saw Dr Adorno in 11/2016) and mild hypothyroidism followed by PCP, pathology in 2018 by FNA showed a benign nodule (includes adenomatoid nodule, colloid nodule, etc.).  She had an episode of cholangitis and was hospitalized for 4 days 8/24/17 (fevers, RUQ pain, + Ecoli blood cx).  She had another similar episode in March 20204.     She was on NO insulin at her 1 year, 2 year, 3 year, 4 year transplant anniversaries and restart  insulin just after 4 years post-tx, insulin restart date of  6/6/2017.   She had a slow opioid wean off suboxone but eventually did come off.     Other history notable for surgical procedure Admitted from 2/1- 2/5/22 for  1. Posterior suture rectopexy (Colorectal primary)  2. Sigmoidoscopy (Colorectal primary)  3. Supracervical hysterectomy with bilateral salpingooophrectomy (Uro)  4. Uterosacral ligament suspension (Uro)  5. Gan colposuspension (Uro)  6. Cystoscopy (Uro)      Interval history:  -  Doing well in general.  Hand has completely healed.     1)  Diabetes data:  Lynn continues to have partial islet graft function.   She is on Fiasp right before meal time and has rapid gastric emptying.  Biggest issue today is that her PDM won't charge.  It is at 8%. She called [facility] who will send a new PDM.  She is on Dexcom G6 and has an iPhone.  So we downloaded the OP5 controller on iPhone and she will start this and save PDM as back up.  Does have glargine called into local pharmacy as well.   A1c. At goal at 6.6%  Complicating factors:  TANGELA, has had 11 rescue puppies with no dog mom so she has been feeding around the clock.     Pump, settings below.    Basal Rates:  12a 0.15 unit(s)/hr  8a 0.35 units/hr      Target Glucose  12a 110 (150)      Sensitivity   12a 100 mg/dL     Insulin to Carb Ratio  12 am 8 grams     Active insulin time 4 hours  Reverse correction off    Total daily insulin dose= 18.1 units of Fiasp, of which 7.8 units per day is basal on pump, and reaminder is bolus.                         HbA1c levels:  Lab Results   Component Value Date    A1C 6.8 11/21/2024    A1C 6.6 08/23/2024    A1C 6.7 03/03/2024    A1C 7.1 09/07/2023    A1C 6.6 04/03/2023    A1C 6.8 05/10/2021    A1C 6.9 02/16/2021    A1C 6.7 08/20/2020    A1C 7.1 06/05/2020    A1C 6.9 02/06/2020       Hypoglycemia history:   Recent history as above.  The patient has had NO episodes of severe hypoglycemia (seizure, coma, or  neuroglycopenic symptoms severe enough to require assistance from another person).  Blood sugars were reviewed from the patient records and/or the meter download.        Patient is good candidate for CGM therapy.  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   --  Require frequent adjustments of the insulin treatment regimen, based on therapeutic CGM test results      2)  Nutrition./PERT: Needs Viokace (due to gastric anatomy, rapid emptying).      3)  Thyroid:  On levothyroxine - recent labs below - now at 125 mcg daily.   TSH   Date Value Ref Range Status   11/21/2024 0.25 (L) 0.30 - 4.20 uIU/mL Final   08/17/2021 2.75 0.40 - 4.00 mU/L Final   03/15/2021 2.93 0.40 - 4.00 mU/L Final     T4 Free   Date Value Ref Range Status   06/05/2020 1.05 0.76 - 1.46 ng/dL Final     Free T4   Date Value Ref Range Status   11/21/2024 1.65 0.90 - 1.70 ng/dL Final     Review of systems:  A complete Review Of Systems was performed but was negative except as noted in the HPI.    Past Medical and Surgical History:  Past Medical History:   Diagnosis Date     Benign paroxysmal positional vertigo     occ.      Calculus of kidney 05/2005    x1 on L side passed, several stones.  Has been tested for oxalate.     Chronic abdominal pain 07/17/2013     Chronic pancreatitis 07/17/2013     Depression     also occ panic spells     Diabetes (H) 5/10/2013    Total Pancreatomy with Auto Islets Transplant     Dyspepsia 06/1999    H. pylori   treated     Dysthymia 03/01/2016     Headaches     still periodic HA's ;  often 5X/week     Hypertension 02/22/2016     Stress related     Iron deficiency anemia 2003    relates to gastric bypass     Post-pancreatectomy diabetes melltius 2013     Sleep apnea     uses splint     Spasm of sphincter of Oddi     surgical + endoscopic stenting of pancreatic duct @ [facility] 06     Thyroid nodule 2016     Past Surgical History:   Procedure Laterality Date     ABDOMINOPLASTY  2002    Tummy tuck     APPENDECTOMY  1990     BUNIONECTOMY Right 1998     CBD Stent placement  2002    CBD stent; Dr. Presley      SECTION       CHOLECYSTECTOMY       COLONOSCOPY N/A 2021    Procedure: COLONOSCOPY INCOMPLETE Aborted due to incomplete prep  will need to take additional prep and return tomorrow 21;  Surgeon: Ihsan Saenz MD;  Location: [facility]     COMBINED CYSTOSCOPY, RETROGRADES, URETEROSCOPY, LASER HOLMIUM LITHOTRIPSY URETER(S), INSERT STENT Right 2015    Procedure: COMBINED CYSTOSCOPY, RETROGRADES, URETEROSCOPY, LASER HOLMIUM LITHOTRIPSY URETER(S), INSERT STENT;  Surgeon: Kennedi Aldana MD;  Location: [facility]     COMBINED CYSTOSCOPY, RETROGRADES, URETEROSCOPY, LASER HOLMIUM LITHOTRIPSY URETER(S), INSERT STENT Right 2015    Procedure: COMBINED CYSTOSCOPY, RETROGRADES, URETEROSCOPY, LASER HOLMIUM LITHOTRIPSY URETER(S), INSERT STENT;  Surgeon: Kennedi Aldana MD;  [facility]: [facility]     COSMETIC SURGERY  2002    Tummy tuck     CYSTECTOMY OVARIAN BENIGN Right      CYSTOSCOPY, RETROGRADES, INSERT STENT URETER(S), COMBINED  10/02/2012    Procedure: COMBINED CYSTOSCOPY, RETROGRADES, INSERT STENT URETER(S);  COMBINED CYSTOSCOPY,  , INSERT LEFT STENT URETER;  Surgeon: Johny Baez MD;  Location: [facility]     CYSTOSCOPY, RETROGRADES, INSERT STENT URETER(S), COMBINED Left 2022    Procedure: Cystoscopy with left retrograde pyelogram, left ureteroscopy, thulium laser lithotripsy of left ureteral calculus, stone basketing and left ureteral stent insertion;  Surgeon: Latricia  Alonzo PURDY MD;  Location: [facility]     ESOPHAGOSCOPY, GASTROSCOPY, DUODENOSCOPY (EGD), COMBINED N/A 01/24/2018    Procedure: COMBINED ESOPHAGOSCOPY, GASTROSCOPY, DUODENOSCOPY (EGD);  ESOPHAGOSCOPY, GASTROSCOPY, DUODENOSCOPY (EGD)    ;  Surgeon: Tamir Rodgers MD;  Location: [facility]     EXTRACORPOREAL SHOCK WAVE LITHOTRIPSY (ESWL)  10/16/2012    Procedure: EXTRACORPOREAL SHOCK WAVE LITHOTRIPSY (ESWL);  left EXTRACORPOREAL SHOCK WAVE LITHOTRIPSY (ESWL) ;  Surgeon: Johny Baez MD;  Location: [facility]     Gastric bypass NOS       HERNIA REPAIR  02/2015     HYSTERECTOMY SUPRACERVICAL, BILATERAL SALPINGO-OOPHORECTOMY, COMBINED N/A 02/01/2022    Procedure: Abdominal supracervical hysterectomy, bilateral salpingooophrectomy;  Surgeon: Nicole Rivera MD;  [facility]     INCISION AND DRAINAGE FINGER, COMBINED Left 11/2/2024    Procedure: Left index finger incision and debridement to the level of bone, dorsally, 2cm2. Left index finger proximal interphalangial joint irrigation. Left index finger incision and debridement to the level of the flexor tendon sheath, 3 cm2.;  Surgeon: Aileen Reza MD;  Location: [facility]     IRRIGATION AND DEBRIDEMENT HAND, COMBINED Left 10/30/2020    Procedure: Left hand sharp excisional debridement of skin, subcutaneous tissue and fat with a scalpel, 2 x 1 x 1 cm.;  Surgeon: Demian Renteria MD;  Location: [facility]     LAPAROSCOPIC LYSIS ADHESIONS N/A 02/20/2015    Procedure: LAPAROSCOPIC LYSIS ADHESIONS;  Surgeon: Aaron Early MD;  [facility]     LAPAROSCOPIC LYSIS ADHESIONS N/A 12/29/2015    Procedure: LAPAROSCOPIC LYSIS ADHESIONS;  Surgeon: Aaron Early MD;  [facility]     PANCREATECTOMY, TRANSPLANT AUTO ISLET CELL, COMBINED  05/10/2013    Procedure: COMBINED PANCREATECTOMY, TRANSPLANT AUTO ISLET CELL;  Pancreatectomy, Auto Islet Cell Transplant   hernia repair, jejunostomy tube and liver biopsies with Anesthesia General with block;  Surgeon: Aaron Early  MD;  [facility]     Partial ileum resection       RECTOPEXY ABDOMINAL N/A 2022    Procedure: RECTOPEXY, ABDOMINAL;  Surgeon: Uriah Sheridan MD;  [facility]     RELEASE TRIGGER FINGER Left 2024    Procedure: Left index finger A1 pulley release.;  Surgeon: Aileen Reza MD;  Location: [facility]     REPAIR PTOSIS BROW BILATERAL Bilateral 2020    Procedure: BILATERAL BROW PTOSIS REPAIR;  Surgeon: Denise Alberts MD;  Location: [facility]     SACROCOLPOPEXY, CYSTOSCOPY, COMBINED N/A 2022    Procedure: Uterosacral ligament suspension, pina colposuspension with Cystoscopy;  Surgeon: Nicole Rivera MD;  [facility]     SIGMOIDOSCOPY FLEXIBLE N/A 2022    Procedure: SIGMOIDOSCOPY, FLEXIBLE;  Surgeon: Uriah Sheridan MD;  [facility]     Surgery for SBO       TONSILLECTOMY, ADENOIDECTOMY, COMBINED  1997     TRANSPLANT  5/10/13    Pancreatic Auto-Islet Transplant       Family History:  New changes since last visit:  none  Family History   Problem Relation Age of Onset     Family History Negative Mother      Respiratory Father         COPD;  at 69     Genitourinary Problems Father         kidney stones     Substance Abuse Father      Depression Father      Asthma Father      Heart Disease Paternal Grandfather         M.I.     Coronary Artery Disease Paternal Grandfather      Hyperlipidemia Paternal Grandfather      Genitourinary Problems Brother         multiple brothers with kidney stones     Gastrointestinal Disease Maternal Grandmother         undiagnosed 'gut' issues     Coronary Artery Disease Maternal Grandfather      Hyperlipidemia Maternal Grandfather      Cerebrovascular Disease Paternal Grandmother         At the age of 103     Anxiety Disorder Paternal Grandmother      Osteoporosis Paternal Grandmother      Anxiety Disorder Son      Anxiety Disorder Daughter      Asthma Daughter      Colon Cancer No family hx of         Social History:  Social History     Social History Narrative     Not on file     Does not work currently.  Mom to many foster dogs     Physical Exam:  Vitals: /75 (BP Location: Right arm, Patient Position: Chair, Cuff Size: Adult Regular)   Pulse 85   Temp 98  F (36.7  C) (Oral)   Resp 16   Wt 68.3 kg (150 lb 9.6 oz)   LMP 12/19/2013   SpO2 97%   BMI 25.85 kg/m    BMI= Body mass index is 25.85 kg/m .  General:  Appearance is normal, no acute distress  HEENT:  NC/AT, sclera appear white  Neck:  No thyroid nodule palpable.   Neck:  No obvious thyromegaly  CV/Lungs:  Non distressed breathing  Skin:  No apparent rashes.   Neuro:  Normal mental status  Psych:  Normal affect    Results.  Lab Results   Component Value Date    A1C 6.6 03/20/2025    A1C 6.8 11/21/2024    A1C 6.6 08/23/2024    A1C 6.7 03/03/2024    A1C 7.1 09/07/2023    A1C 6.6 04/03/2023    A1C 6.8 05/10/2021    A1C 6.9 02/16/2021    A1C 6.7 08/20/2020    A1C 7.1 06/05/2020    A1C 6.9 02/06/2020       Mixed Meal Test:  C Peptide   Date Value Ref Range Status   11/21/2024 2.5 0.9 - 6.9 ng/mL Final     Comment:     Reference range applies to fasting specimens.   11/21/2024 1.9 0.9 - 6.9 ng/mL Final     Comment:     Reference range applies to fasting specimens.   11/21/2024 0.5 (L) 0.9 - 6.9 ng/mL Final     Comment:     Reference range applies to fasting specimens.   09/07/2023 2.2 0.9 - 6.9 ng/mL Final   09/07/2023 1.8 0.9 - 6.9 ng/mL Final   08/20/2020 3.4 0.9 - 6.9 ng/mL Final   08/20/2020 1.5 0.9 - 6.9 ng/mL Final   08/20/2020 0.5 (L) 0.9 - 6.9 ng/mL Final   05/16/2019 1.8 0.9 - 6.9 ng/mL Final   05/16/2019 1.5 0.9 - 6.9 ng/mL Final     Glucose   Date Value Ref Range Status   11/21/2024 249 (H) 70 - 99 mg/dL Final   11/21/2024 335 (H) 70 - 99 mg/dL Final   11/21/2024 137 (H) 70 - 99 mg/dL Final   06/14/2022 104 (H) 70 - 99 mg/dL Final   04/16/2022 241 (H) 70 - 99 mg/dL Final   02/01/2022 90 70 - 99 mg/dL Final   01/18/2022 123 (H) 70 - 99  mg/dL Final   11/26/2021 139 (H) 70 - 99 mg/dL Final   05/10/2021 169 (H) 70 - 99 mg/dL Final   03/01/2021 141 (H) 70 - 99 mg/dL Final   02/28/2021 120 (H) 70 - 99 mg/dL Final   02/16/2021 106 (H) 70 - 99 mg/dL Final   10/30/2020 126 (H) 70 - 99 mg/dL Final     GLUCOSE BY METER POCT   Date Value Ref Range Status   11/05/2024 110 (H) 70 - 99 mg/dL Final   11/05/2024 107 (H) 70 - 99 mg/dL Final   11/05/2024 115 (H) 70 - 99 mg/dL Final     Comment:     /RN Notified   11/04/2024 121 (H) 70 - 99 mg/dL Final   11/04/2024 117 (H) 70 - 99 mg/dL Final       Hemoglobin A1c  Lab Results   Component Value Date    A1C 6.8 11/21/2024    A1C 6.6 08/23/2024    A1C 6.7 03/03/2024    A1C 7.1 09/07/2023    A1C 6.6 04/03/2023    A1C 6.8 05/10/2021    A1C 6.9 02/16/2021    A1C 6.7 08/20/2020    A1C 7.1 06/05/2020    A1C 6.9 02/06/2020       Liver enzymes  Lab Results   Component Value Date    AST 43 11/21/2024    AST 24 05/10/2021     Lab Results   Component Value Date    ALT 42 11/21/2024    ALT 35 05/10/2021     Lab Results   Component Value Date    BILICONJ 0.0 05/12/2014      Lab Results   Component Value Date    BILITOTAL 0.5 11/21/2024    BILITOTAL 0.3 05/10/2021     Lab Results   Component Value Date    ALBUMIN 4.1 11/21/2024    ALBUMIN 3.2 04/16/2022    ALBUMIN 3.4 05/10/2021     Lab Results   Component Value Date    PROTTOTAL 7.0 11/21/2024    PROTTOTAL 6.9 05/10/2021      Lab Results   Component Value Date    ALKPHOS 110 11/21/2024    ALKPHOS 132 05/10/2021       Creatinine:  Creatinine   Date Value Ref Range Status   11/21/2024 1.07 (H) 0.51 - 0.95 mg/dL Final   05/10/2021 0.81 0.52 - 1.04 mg/dL Final   ]    Complete Blood Count:  CBC RESULTS:   Recent Labs   Lab Test 11/21/24  1001   WBC 7.2   RBC 4.16   HGB 12.4   HCT 38.0   MCV 91   MCH 29.8   MCHC 32.6   RDW 13.8          Cholesterol  Lab Results   Component Value Date    CHOL 205 11/21/2024    CHOL 192 09/17/2020     Lab Results   Component Value Date    HDL 69  11/21/2024    HDL 91 09/17/2020     Lab Results   Component Value Date     11/21/2024    LDL 84 09/17/2020     Lab Results   Component Value Date    TRIG 175 11/21/2024    TRIG 83 09/17/2020     Lab Results   Component Value Date    CHOLHDLRATIO 1.9 11/20/2014       Vitamin and Iron studies  Component      Latest Ref Rng 11/21/2024  10:01 AM   Vitamin A      0.30 - 1.20 mg/L 0.45    Retinol Palmitate      0.00 - 0.10 mg/L <0.02    Vitamin A Interp Normal    25 OH Vit D2      ug/L <5    25 OH Vit D3      ug/L 91    25 OH Vit D total      20 - 75 ug/L <96 (H)    Iron      37 - 145 ug/dL 84    Iron Binding Capacity      240 - 430 ug/dL 272    Iron Sat Index      15 - 46 % 31    Vitamin E      5.5 - 18.0 mg/L 7.1    Vitamin E Gamma      0.0 - 6.0 mg/L 1.2    Vitamin B12      232 - 1,245 pg/mL 374    Ferritin      11 - 328 ng/mL 177    Zinc      60.0 - 120.0 ug/dL 84.3       Legend:  (H) High    Component      Latest Ref Rng 11/21/2024  10:01 AM   Lauric Acid C12 0 Test      6 - 90 nmol/mL 34    Myristic Acid C14 0 Test      30 - 450 nmol/mL 292    Hexadecenoic Acid Test      25 - 105 nmol/mL 81    Palmitoleic Acid Test      110 - 1130 nmol/mL 1016    Palmitic Acid Test      1480 - 3730 nmol/mL 3304    G Linolenic Acid Test      16 - 150 nmol/mL 163 (H)    A linolenic Acid Test      50 - 130 nmol/mL 99    Linoleic Acid Test      2270 - 3850 nmol/mL 2996    Oleic Acid Test      650 - 3500 nmol/mL 2892    Vaccenic Acid Test      280 - 740 nmol/mL 9 (L)    Stearic Acid Test      590 - 1170 nmol/mL 816    EPA C20 5W3 Test      14 - 100 nmol/mL 85    Arachidonic Acid Test      520 - 1490 nmol/mL 1778 (H) (C)   [place] Acid Test      7 - 30 nmol/mL 72 (H)    H G Linolenic Test      50 - 250 nmol/mL 191    Arachidic Acid Test      50 - 90 nmol/mL 31 (L)    DHA C22 6W3 Test      30 - 250 nmol/mL 88    DPA C22 5W6 Test      10 - 70 nmol/mL 31    DPA C22 5W3 Test      20 - 210 nmol/mL 80    DTA C22 4W6 Test      10 - 80  nmol/mL 46    Docosenoic Acid Test      4 - 13 nmol/mL 8    Nervonic Acid Test      60 - 100 nmol/mL 110 (H)    Triene Tetraene Ratio Test      0.010 - 0.038  0.040 (H) (C)   Total Saturated Acid Test      2.5 - 5.5 mmol/L 4.6    Total Monounsat Acid Test      1.3 - 5.8 mmol/L 4.2    Total Polyunsat Acid Test      3.2 - 5.8 mmol/L 4.9    Total W3 Test      0.2 - 0.5 mmol/L 0.4    Total W6 Test      3.0 - 5.4 mmol/L 4.4    Total Fatty Acids Test      7.3 - 16.8 mmol/L 13.7    Interpretation Test SEE NOTE       Legend:  (H) High  (L) Low  (C) Corrected      Again, thank you for allowing me to participate in the care of your patient.        Sincerely,        Adriana Robles MD    Electronically signed

## 2025-03-26 ENCOUNTER — ONCOLOGY VISIT (OUTPATIENT)
Dept: ONCOLOGY | Facility: CLINIC | Age: 60
End: 2025-03-26
Attending: INTERNAL MEDICINE
Payer: COMMERCIAL

## 2025-03-26 ENCOUNTER — LAB (OUTPATIENT)
Dept: INFUSION THERAPY | Facility: CLINIC | Age: 60
End: 2025-03-26
Attending: INTERNAL MEDICINE
Payer: COMMERCIAL

## 2025-03-26 VITALS
SYSTOLIC BLOOD PRESSURE: 144 MMHG | RESPIRATION RATE: 14 BRPM | HEART RATE: 69 BPM | DIASTOLIC BLOOD PRESSURE: 78 MMHG | OXYGEN SATURATION: 96 % | BODY MASS INDEX: 26.37 KG/M2 | TEMPERATURE: 98.8 F | WEIGHT: 153.6 LBS

## 2025-03-26 DIAGNOSIS — D50.9 IRON DEFICIENCY ANEMIA, UNSPECIFIED IRON DEFICIENCY ANEMIA TYPE: Primary | ICD-10-CM

## 2025-03-26 DIAGNOSIS — M85.80 OSTEOPENIA: Primary | ICD-10-CM

## 2025-03-26 DIAGNOSIS — D50.9 IRON DEFICIENCY ANEMIA, UNSPECIFIED IRON DEFICIENCY ANEMIA TYPE: ICD-10-CM

## 2025-03-26 LAB
BASOPHILS # BLD AUTO: 0.1 10E3/UL (ref 0–0.2)
BASOPHILS NFR BLD AUTO: 1 %
EOSINOPHIL # BLD AUTO: 0.2 10E3/UL (ref 0–0.7)
EOSINOPHIL NFR BLD AUTO: 3 %
ERYTHROCYTE [DISTWIDTH] IN BLOOD BY AUTOMATED COUNT: 14.6 % (ref 10–15)
FERRITIN SERPL-MCNC: 54 NG/ML (ref 11–328)
HCT VFR BLD AUTO: 39.9 % (ref 35–47)
HGB BLD-MCNC: 12.5 G/DL (ref 11.7–15.7)
IMM GRANULOCYTES # BLD: 0 10E3/UL
IMM GRANULOCYTES NFR BLD: 0 %
IRON BINDING CAPACITY (ROCHE): 293 UG/DL (ref 240–430)
IRON SATN MFR SERPL: 30 % (ref 15–46)
IRON SERPL-MCNC: 88 UG/DL (ref 37–145)
LYMPHOCYTES # BLD AUTO: 2.3 10E3/UL (ref 0.8–5.3)
LYMPHOCYTES NFR BLD AUTO: 32 %
MCH RBC QN AUTO: 28.5 PG (ref 26.5–33)
MCHC RBC AUTO-ENTMCNC: 31.3 G/DL (ref 31.5–36.5)
MCV RBC AUTO: 91 FL (ref 78–100)
MONOCYTES # BLD AUTO: 0.8 10E3/UL (ref 0–1.3)
MONOCYTES NFR BLD AUTO: 12 %
NEUTROPHILS # BLD AUTO: 3.8 10E3/UL (ref 1.6–8.3)
NEUTROPHILS NFR BLD AUTO: 53 %
NRBC # BLD AUTO: 0 10E3/UL
NRBC BLD AUTO-RTO: 0 /100
PLATELET # BLD AUTO: 376 10E3/UL (ref 150–450)
RBC # BLD AUTO: 4.39 10E6/UL (ref 3.8–5.2)
WBC # BLD AUTO: 7.2 10E3/UL (ref 4–11)

## 2025-03-26 PROCEDURE — 85004 AUTOMATED DIFF WBC COUNT: CPT | Performed by: INTERNAL MEDICINE

## 2025-03-26 PROCEDURE — 82728 ASSAY OF FERRITIN: CPT | Performed by: INTERNAL MEDICINE

## 2025-03-26 PROCEDURE — 83540 ASSAY OF IRON: CPT | Performed by: INTERNAL MEDICINE

## 2025-03-26 PROCEDURE — 83550 IRON BINDING TEST: CPT | Performed by: INTERNAL MEDICINE

## 2025-03-26 PROCEDURE — 99213 OFFICE O/P EST LOW 20 MIN: CPT | Performed by: INTERNAL MEDICINE

## 2025-03-26 PROCEDURE — 36415 COLL VENOUS BLD VENIPUNCTURE: CPT

## 2025-03-26 PROCEDURE — 85018 HEMOGLOBIN: CPT | Performed by: INTERNAL MEDICINE

## 2025-03-26 ASSESSMENT — PAIN SCALES - GENERAL: PAINLEVEL_OUTOF10: NO PAIN (0)

## 2025-03-26 NOTE — LETTER
"3/26/2025      Lynn Thompson  [address] [employer] [address]      Dear Colleague,    Thank you for referring your patient, Lynn Thompson, to the [facility]. Please see a copy of my visit note below.    Oncology Rooming Note    March 26, 2025 2:47 PM   Lynn Thompson is a 60 year old female who presents for:    Chief Complaint   Patient presents with     Oncology Clinic Visit     Initial Vitals: BP (!) 144/78   Pulse 69   Temp 98.8  F (37.1  C) (Oral)   Resp 14   Wt 69.7 kg (153 lb 9.6 oz)   LMP 12/19/2013   SpO2 96%   BMI 26.37 kg/m   Estimated body mass index is 26.37 kg/m  as calculated from the following:    Height as of 11/1/24: 1.626 m (5' 4\").    Weight as of this encounter: 69.7 kg (153 lb 9.6 oz). Body surface area is 1.77 meters squared.  No Pain (0) Comment: Data Unavailable   Patient's last menstrual period was 12/19/2013.  Allergies reviewed: Yes  Medications reviewed: Yes    Medications: Medication refills not needed today.  Pharmacy name entered into [facility]:    [id number] - [place], [place] - [address] [facility] - [place], [place] - [address]    Frailty Screening:   Is the patient here for a new oncology consult visit in cancer care? 2. No    PHQ9:  Did this patient require a PHQ9?: No      Clinical concerns: fatigue - Dr. Kylah peterson.      [facility] J[facility] 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        Lynn says that she has had anemia all of her life.  She was previously followed " by Dr. Tom Malcolm at [facility] since 2004.  She was followed and treated for iron-deficiency anemia.  It was felt that it was due to poor absorption from her gastric bypass and various bowel surgeries in the past.  She was unable to tolerate oral and and could not absorb it.  She gets IV iron intermittently.  She says a round of IV iron would last her for several years.  She last saw Dr. Malcolm in 2019.       Patient has been anemic again.  She underwent colonoscopy on 4/1/21, which found some polyps that were removed.  Due to unintentional weight loss, she underwent mammogram that was negative.  She had a CT abdomen/pelvis on 3/1/21 that was normal, other than non-specific proximal colitis.  She does not smoke.       She received Injectafer x 2 doses in May 2021. Received Feraheme 8/2023.     Interval History:  Malina Trevino returns for follow-up.  Her iron saturation and hemoglobin are normal.  Ferritin is pending.  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  Has a CPAP machine.    Review of Systems:  A complete review of systems was negative except as noted in the HPI.     Past medical, Surgical, and social history:  Reviewed    Physical Examination:  General: Very Pleasant patient in no acute distress. No pallor. Normal work of breathing. Oriented and alert.   Heart RRR   Lungs clear  Abdomen soft  Ext no edema     Laboratory Data:  Ferritin pending.  Hemoglobin 12.5.  Iron [id number]%      Assessment and Plan:  Lynn Thompson is a 60 year old female with history of multiple bowel surgeries and lifelong iron deficiency anemia.     1 iron deficiency:    -if saturation >20% and Ferritin >15 no need for iv iron  -see me as needed, Dr Oliveira can send her back if iv iron needed      She is up to date on GI screenings, most recent colonoscopy with last 4/2021.  She has iron deficiency now.  History of gastric bypass.  Injectafer x2.         Ferritin pending if >15 no need for iv iron. Lab check in 6 months PCP can follow see us as needed. Pt agrees  20 minutes spent on the date of the encounter doing chart review, review of test results, interpretation of tests, patient visit and documentation     Nico Montero MD  [facility]   [phone]         Again, thank you for allowing me to participate in the care of your patient.        Sincerely,        Nico Montero MD    Electronically signed

## 2025-03-26 NOTE — PROGRESS NOTES
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        Lynn says that she has had anemia all of her life.  She was previously followed by Dr. Tom Malcolm at [facility] since 2004.  She was followed and treated for iron-deficiency anemia.  It was felt that it was due to poor absorption from her gastric bypass and various bowel surgeries in the past.  She was unable to tolerate oral and and could not absorb it.  She gets IV iron intermittently.  She says a round of IV iron would last her for several years.  She last saw Dr. Malcolm in 2019.       Patient has been anemic again.  She underwent colonoscopy on 4/1/21, which found some polyps that were removed.  Due to unintentional weight loss, she underwent mammogram that was negative.  She had a CT abdomen/pelvis on 3/1/21 that was normal, other than non-specific proximal colitis.  She does not smoke.       She received Injectafer x 2 doses in May 2021. Received Feraheme 8/2023.     Interval History:  Malina Trevino returns for follow-up.  Her iron saturation and hemoglobin are normal.  Ferritin is pending.  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  Has a CPAP machine.    Review of Systems:  A complete review of systems was negative except as noted in the HPI.     Past medical, Surgical, and social history:  Reviewed    Physical Examination:  General: Very Pleasant patient in no acute distress. No pallor. Normal work of breathing. Oriented and alert.   Heart RRR   Lungs clear  Abdomen soft  Ext no edema      Laboratory Data:  Ferritin pending.  Hemoglobin 12.5.  Iron [id number]%      Assessment and Plan:  Lynn Thompson is a 60 year old female with history of multiple bowel surgeries and lifelong iron deficiency anemia.     1 iron deficiency:    -if saturation >20% and Ferritin >15 no need for iv iron  -see me as needed, Dr Oliveira can send her back if iv iron needed      She is up to date on GI screenings, most recent colonoscopy with last 4/2021.  She has iron deficiency now.  History of gastric bypass.  Injectafer x2.        Ferritin pending if >15 no need for iv iron. Lab check in 6 months PCP can follow see us as needed. Pt agrees  20 minutes spent on the date of the encounter doing chart review, review of test results, interpretation of tests, patient visit and documentation     Nico Montero MD  [facility]   [phone]

## 2025-03-26 NOTE — PROGRESS NOTES
"Oncology Rooming Note    March 26, 2025 2:47 PM   Lynn Thompson is a 60 year old female who presents for:    Chief Complaint   Patient presents with    Oncology Clinic Visit     Initial Vitals: BP (!) 144/78   Pulse 69   Temp 98.8  F (37.1  C) (Oral)   Resp 14   Wt 69.7 kg (153 lb 9.6 oz)   LMP 12/19/2013   SpO2 96%   BMI 26.37 kg/m   Estimated body mass index is 26.37 kg/m  as calculated from the following:    Height as of 11/1/24: 1.626 m (5' 4\").    Weight as of this encounter: 69.7 kg (153 lb 9.6 oz). Body surface area is 1.77 meters squared.  No Pain (0) Comment: Data Unavailable   Patient's last menstrual period was 12/19/2013.  Allergies reviewed: Yes  Medications reviewed: Yes    Medications: Medication refills not needed today.  Pharmacy name entered into [employer]:    [id number] - [place], [place] - [address] [facility] - [place], [place] - [address]    Frailty Screening:   Is the patient here for a new oncology consult visit in cancer care? 2. No    PHQ9:  Did this patient require a PHQ9?: No      Clinical concerns: fatigue - Dr. Montero informed.      Shari J. Schoenberger, [facility]            "

## 2025-03-26 NOTE — PROGRESS NOTES
Medical Assistant Note:  Lynn Thompson presents today for lab draw.    Patient seen by provider today: No.   present during visit today: Not Applicable.    Concerns: No Concerns.    Procedure:  Lab draw site: LAC, Needle type: BF, Gauge: 21. Brandon and coban applied    Post Assessment:  Labs drawn without difficulty: Yes.    Discharge Plan:  Departure Mode: Ambulatory.    Face to Face Time: 5.    Racehl Higginbotham CMA

## 2025-04-25 PROBLEM — R10.84 GENERALIZED ABDOMINAL PAIN: Status: RESOLVED | Noted: 2024-08-22 | Resolved: 2025-04-25

## 2025-04-25 PROBLEM — K21.9 GASTROESOPHAGEAL REFLUX DISEASE WITHOUT ESOPHAGITIS: Status: ACTIVE | Noted: 2025-04-25

## 2025-04-28 ENCOUNTER — LAB (OUTPATIENT)
Dept: ONCOLOGY | Facility: CLINIC | Age: 60
End: 2025-04-28
Attending: INTERNAL MEDICINE
Payer: COMMERCIAL

## 2025-04-28 ENCOUNTER — ANCILLARY PROCEDURE (OUTPATIENT)
Dept: MAMMOGRAPHY | Facility: CLINIC | Age: 60
End: 2025-04-28
Attending: FAMILY MEDICINE
Payer: COMMERCIAL

## 2025-04-28 DIAGNOSIS — D50.9 IRON DEFICIENCY ANEMIA, UNSPECIFIED IRON DEFICIENCY ANEMIA TYPE: ICD-10-CM

## 2025-04-28 DIAGNOSIS — Z12.31 VISIT FOR SCREENING MAMMOGRAM: ICD-10-CM

## 2025-04-28 LAB
BASOPHILS # BLD AUTO: 0.1 10E3/UL (ref 0–0.2)
BASOPHILS NFR BLD AUTO: 2 %
EOSINOPHIL # BLD AUTO: 0.2 10E3/UL (ref 0–0.7)
EOSINOPHIL NFR BLD AUTO: 4 %
ERYTHROCYTE [DISTWIDTH] IN BLOOD BY AUTOMATED COUNT: 14.7 % (ref 10–15)
FERRITIN SERPL-MCNC: 42 NG/ML (ref 11–328)
HCT VFR BLD AUTO: 37.9 % (ref 35–47)
HGB BLD-MCNC: 12.2 G/DL (ref 11.7–15.7)
IMM GRANULOCYTES # BLD: 0 10E3/UL
IMM GRANULOCYTES NFR BLD: 0 %
IRON BINDING CAPACITY (ROCHE): 271 UG/DL (ref 240–430)
IRON SATN MFR SERPL: 21 % (ref 15–46)
IRON SERPL-MCNC: 58 UG/DL (ref 37–145)
LYMPHOCYTES # BLD AUTO: 2.2 10E3/UL (ref 0.8–5.3)
LYMPHOCYTES NFR BLD AUTO: 35 %
MCH RBC QN AUTO: 29 PG (ref 26.5–33)
MCHC RBC AUTO-ENTMCNC: 32.2 G/DL (ref 31.5–36.5)
MCV RBC AUTO: 90 FL (ref 78–100)
MONOCYTES # BLD AUTO: 0.7 10E3/UL (ref 0–1.3)
MONOCYTES NFR BLD AUTO: 10 %
NEUTROPHILS # BLD AUTO: 3.1 10E3/UL (ref 1.6–8.3)
NEUTROPHILS NFR BLD AUTO: 50 %
NRBC # BLD AUTO: 0 10E3/UL
NRBC BLD AUTO-RTO: 0 /100
PLATELET # BLD AUTO: 403 10E3/UL (ref 150–450)
RBC # BLD AUTO: 4.2 10E6/UL (ref 3.8–5.2)
WBC # BLD AUTO: 6.3 10E3/UL (ref 4–11)

## 2025-04-28 PROCEDURE — 77063 BREAST TOMOSYNTHESIS BI: CPT | Mod: TC | Performed by: RADIOLOGY

## 2025-04-28 PROCEDURE — 36415 COLL VENOUS BLD VENIPUNCTURE: CPT

## 2025-04-28 PROCEDURE — 77067 SCR MAMMO BI INCL CAD: CPT | Mod: TC | Performed by: RADIOLOGY

## 2025-04-28 PROCEDURE — 85004 AUTOMATED DIFF WBC COUNT: CPT | Performed by: INTERNAL MEDICINE

## 2025-04-28 PROCEDURE — 83550 IRON BINDING TEST: CPT | Performed by: INTERNAL MEDICINE

## 2025-04-28 PROCEDURE — 82728 ASSAY OF FERRITIN: CPT | Performed by: INTERNAL MEDICINE

## 2025-04-28 NOTE — PROGRESS NOTES
Medical Assistant Note:  Lynn Thompson presents today for blood draw.    Patient seen by provider today: No.   present during visit today: Not Applicable.    Concerns: No Concerns.    Procedure:  Lab draw site: left antecub, Needle type: butterfly, Gauge: 23 g.    Post Assessment:  Labs drawn without difficulty: Yes.    Discharge Plan:  Departure Mode: Ambulatory.    Face to Face Time: 10.    Ada Barragan [facility]

## 2025-05-21 ENCOUNTER — TELEPHONE (OUTPATIENT)
Dept: UROLOGY | Facility: CLINIC | Age: 60
End: 2025-05-21
Payer: COMMERCIAL

## 2025-05-21 NOTE — TELEPHONE ENCOUNTER
Sent order for 24 hour urine kit x 1 to [facility] via [employer]. [employer] message sent to patient with instructions for completion.     SATISH Grimes  Care Coordinator- Urology   [phone]

## 2025-07-08 DIAGNOSIS — K21.9 GASTROESOPHAGEAL REFLUX DISEASE WITHOUT ESOPHAGITIS: Primary | ICD-10-CM

## 2025-07-09 NOTE — TELEPHONE ENCOUNTER
Clinic RN: Please investigate patient's chart or contact patient if the information cannot be found because the medication is listed as historical or discontinued. Confirm patient is taking this medication. Document findings and route refill encounter to provider for approval or denial.    Alessia Mccoy RN on 7/9/2025 at 10:21 AM

## 2025-07-09 NOTE — TELEPHONE ENCOUNTER
Rx was discontinued during hospital stay in August 2024. No notes as to why was taken off med list.    Елена CASTAÑEDAN, RN, PHN

## 2025-07-10 RX ORDER — FAMOTIDINE 40 MG/1
TABLET, FILM COATED ORAL
Qty: 180 TABLET | Refills: 3 | Status: SHIPPED | OUTPATIENT
Start: 2025-07-10

## 2025-07-17 ENCOUNTER — TRANSFERRED RECORDS (OUTPATIENT)
Dept: HEALTH INFORMATION MANAGEMENT | Facility: CLINIC | Age: 60
End: 2025-07-17
Payer: COMMERCIAL

## 2025-07-18 ENCOUNTER — APPOINTMENT (OUTPATIENT)
Dept: CT IMAGING | Facility: CLINIC | Age: 60
End: 2025-07-18
Attending: STUDENT IN AN ORGANIZED HEALTH CARE EDUCATION/TRAINING PROGRAM
Payer: COMMERCIAL

## 2025-07-18 ENCOUNTER — APPOINTMENT (OUTPATIENT)
Dept: GENERAL RADIOLOGY | Facility: CLINIC | Age: 60
End: 2025-07-18
Attending: UROLOGY
Payer: COMMERCIAL

## 2025-07-18 ENCOUNTER — ANESTHESIA (OUTPATIENT)
Dept: SURGERY | Facility: CLINIC | Age: 60
End: 2025-07-18
Payer: COMMERCIAL

## 2025-07-18 ENCOUNTER — ANESTHESIA EVENT (OUTPATIENT)
Dept: SURGERY | Facility: CLINIC | Age: 60
End: 2025-07-18
Payer: COMMERCIAL

## 2025-07-18 ENCOUNTER — HOSPITAL ENCOUNTER (INPATIENT)
Facility: CLINIC | Age: 60
LOS: 1 days | Discharge: HOME | End: 2025-07-19
Attending: STUDENT IN AN ORGANIZED HEALTH CARE EDUCATION/TRAINING PROGRAM | Admitting: INTERNAL MEDICINE
Payer: COMMERCIAL

## 2025-07-18 DIAGNOSIS — N20.0 KIDNEY STONE ON LEFT SIDE: ICD-10-CM

## 2025-07-18 DIAGNOSIS — N20.0 KIDNEY STONE: Primary | ICD-10-CM

## 2025-07-18 DIAGNOSIS — N39.0 COMPLICATED UTI (URINARY TRACT INFECTION): ICD-10-CM

## 2025-07-18 LAB
ALBUMIN SERPL BCG-MCNC: 4.2 G/DL (ref 3.5–5.2)
ALBUMIN UR-MCNC: 20 MG/DL
ALP SERPL-CCNC: 105 U/L (ref 40–150)
ALT SERPL W P-5'-P-CCNC: 23 U/L (ref 0–50)
ANION GAP SERPL CALCULATED.3IONS-SCNC: 9 MMOL/L (ref 7–15)
APPEARANCE UR: ABNORMAL
AST SERPL W P-5'-P-CCNC: 35 U/L (ref 0–45)
BACTERIA #/AREA URNS HPF: ABNORMAL /HPF
BASOPHILS # BLD AUTO: 0.1 10E3/UL (ref 0–0.2)
BASOPHILS NFR BLD AUTO: 1 %
BILIRUB DIRECT SERPL-MCNC: <0.08 MG/DL (ref 0–0.3)
BILIRUB SERPL-MCNC: 0.2 MG/DL
BILIRUB UR QL STRIP: NEGATIVE
BUN SERPL-MCNC: 26.3 MG/DL (ref 8–23)
CALCIUM SERPL-MCNC: 9.5 MG/DL (ref 8.8–10.4)
CHLORIDE SERPL-SCNC: 102 MMOL/L (ref 98–107)
COLOR UR AUTO: ABNORMAL
CREAT SERPL-MCNC: 0.95 MG/DL (ref 0.51–0.95)
CRP SERPL-MCNC: <3 MG/L
EGFRCR SERPLBLD CKD-EPI 2021: 67 ML/MIN/1.73M2
EOSINOPHIL # BLD AUTO: 0.2 10E3/UL (ref 0–0.7)
EOSINOPHIL NFR BLD AUTO: 2 %
ERYTHROCYTE [DISTWIDTH] IN BLOOD BY AUTOMATED COUNT: 14.4 % (ref 10–15)
EST. AVERAGE GLUCOSE BLD GHB EST-MCNC: 151 MG/DL
GLUCOSE BLDC GLUCOMTR-MCNC: 66 MG/DL (ref 70–99)
GLUCOSE SERPL-MCNC: 101 MG/DL (ref 70–99)
GLUCOSE UR STRIP-MCNC: 70 MG/DL
HBA1C MFR BLD: 6.9 %
HCO3 SERPL-SCNC: 27 MMOL/L (ref 22–29)
HCT VFR BLD AUTO: 42.5 % (ref 35–47)
HGB BLD-MCNC: 13.4 G/DL (ref 11.7–15.7)
HGB UR QL STRIP: ABNORMAL
HOLD SPECIMEN: NORMAL
HOLD SPECIMEN: NORMAL
IMM GRANULOCYTES # BLD: 0 10E3/UL
IMM GRANULOCYTES NFR BLD: 0 %
KETONES UR STRIP-MCNC: NEGATIVE MG/DL
LEUKOCYTE ESTERASE UR QL STRIP: ABNORMAL
LIPASE SERPL-CCNC: 5 U/L (ref 13–60)
LYMPHOCYTES # BLD AUTO: 2.6 10E3/UL (ref 0.8–5.3)
LYMPHOCYTES NFR BLD AUTO: 35 %
MCH RBC QN AUTO: 28.4 PG (ref 26.5–33)
MCHC RBC AUTO-ENTMCNC: 31.5 G/DL (ref 31.5–36.5)
MCV RBC AUTO: 90 FL (ref 78–100)
MONOCYTES # BLD AUTO: 0.9 10E3/UL (ref 0–1.3)
MONOCYTES NFR BLD AUTO: 11 %
NEUTROPHILS # BLD AUTO: 3.8 10E3/UL (ref 1.6–8.3)
NEUTROPHILS NFR BLD AUTO: 50 %
NITRATE UR QL: NEGATIVE
NRBC # BLD AUTO: 0 10E3/UL
NRBC BLD AUTO-RTO: 0 /100
PH UR STRIP: 6 [PH] (ref 5–7)
PLAT MORPH BLD: NORMAL
PLATELET # BLD AUTO: 360 10E3/UL (ref 150–450)
POTASSIUM SERPL-SCNC: 4.5 MMOL/L (ref 3.4–5.3)
PROT SERPL-MCNC: 7.1 G/DL (ref 6.4–8.3)
RBC # BLD AUTO: 4.72 10E6/UL (ref 3.8–5.2)
RBC MORPH BLD: NORMAL
RBC URINE: 23 /HPF
SODIUM SERPL-SCNC: 138 MMOL/L (ref 135–145)
SP GR UR STRIP: 1.01 (ref 1–1.03)
SQUAMOUS EPITHELIAL: 1 /HPF
UROBILINOGEN UR STRIP-MCNC: NORMAL MG/DL
WBC # BLD AUTO: 7.6 10E3/UL (ref 4–11)
WBC CLUMPS #/AREA URNS HPF: PRESENT /HPF
WBC URINE: >182 /HPF

## 2025-07-18 PROCEDURE — 250N000011 HC RX IP 250 OP 636: Performed by: UROLOGY

## 2025-07-18 PROCEDURE — 99222 1ST HOSP IP/OBS MODERATE 55: CPT | Mod: 25 | Performed by: UROLOGY

## 2025-07-18 PROCEDURE — 74176 CT ABD & PELVIS W/O CONTRAST: CPT

## 2025-07-18 PROCEDURE — 85004 AUTOMATED DIFF WBC COUNT: CPT | Performed by: STUDENT IN AN ORGANIZED HEALTH CARE EDUCATION/TRAINING PROGRAM

## 2025-07-18 PROCEDURE — 120N000001 HC R&B MED SURG/OB

## 2025-07-18 PROCEDURE — C1769 GUIDE WIRE: HCPCS | Performed by: UROLOGY

## 2025-07-18 PROCEDURE — 258N000001 HC RX 258: Performed by: UROLOGY

## 2025-07-18 PROCEDURE — 74420 UROGRAPHY RTRGR +-KUB: CPT | Mod: 26 | Performed by: UROLOGY

## 2025-07-18 PROCEDURE — 250N000011 HC RX IP 250 OP 636: Performed by: STUDENT IN AN ORGANIZED HEALTH CARE EDUCATION/TRAINING PROGRAM

## 2025-07-18 PROCEDURE — C2617 STENT, NON-COR, TEM W/O DEL: HCPCS | Performed by: UROLOGY

## 2025-07-18 PROCEDURE — 999N000179 XR SURGERY CARM FLUORO LESS THAN 5 MIN W STILLS

## 2025-07-18 PROCEDURE — 36415 COLL VENOUS BLD VENIPUNCTURE: CPT | Performed by: STUDENT IN AN ORGANIZED HEALTH CARE EDUCATION/TRAINING PROGRAM

## 2025-07-18 PROCEDURE — 82248 BILIRUBIN DIRECT: CPT | Performed by: STUDENT IN AN ORGANIZED HEALTH CARE EDUCATION/TRAINING PROGRAM

## 2025-07-18 PROCEDURE — 81001 URINALYSIS AUTO W/SCOPE: CPT | Performed by: STUDENT IN AN ORGANIZED HEALTH CARE EDUCATION/TRAINING PROGRAM

## 2025-07-18 PROCEDURE — 250N000025 HC SEVOFLURANE, PER MIN: Performed by: UROLOGY

## 2025-07-18 PROCEDURE — 360N000083 HC SURGERY LEVEL 3 W/ FLUORO, PER MIN: Performed by: UROLOGY

## 2025-07-18 PROCEDURE — C1758 CATHETER, URETERAL: HCPCS | Performed by: UROLOGY

## 2025-07-18 PROCEDURE — 99223 1ST HOSP IP/OBS HIGH 75: CPT | Performed by: PHYSICIAN ASSISTANT

## 2025-07-18 PROCEDURE — 80048 BASIC METABOLIC PNL TOTAL CA: CPT | Performed by: STUDENT IN AN ORGANIZED HEALTH CARE EDUCATION/TRAINING PROGRAM

## 2025-07-18 PROCEDURE — 87186 SC STD MICRODIL/AGAR DIL: CPT | Performed by: STUDENT IN AN ORGANIZED HEALTH CARE EDUCATION/TRAINING PROGRAM

## 2025-07-18 PROCEDURE — 83036 HEMOGLOBIN GLYCOSYLATED A1C: CPT | Performed by: PHYSICIAN ASSISTANT

## 2025-07-18 PROCEDURE — 0T778DZ DILATION OF LEFT URETER WITH INTRALUMINAL DEVICE, VIA NATURAL OR ARTIFICIAL OPENING ENDOSCOPIC: ICD-10-PCS | Performed by: UROLOGY

## 2025-07-18 PROCEDURE — 255N000002 HC RX 255 OP 636: Performed by: UROLOGY

## 2025-07-18 PROCEDURE — 250N000009 HC RX 250: Performed by: NURSE ANESTHETIST, CERTIFIED REGISTERED

## 2025-07-18 PROCEDURE — 370N000017 HC ANESTHESIA TECHNICAL FEE, PER MIN: Performed by: UROLOGY

## 2025-07-18 PROCEDURE — 272N000001 HC OR GENERAL SUPPLY STERILE: Performed by: UROLOGY

## 2025-07-18 PROCEDURE — 96365 THER/PROPH/DIAG IV INF INIT: CPT | Mod: 59

## 2025-07-18 PROCEDURE — 258N000003 HC RX IP 258 OP 636: Performed by: STUDENT IN AN ORGANIZED HEALTH CARE EDUCATION/TRAINING PROGRAM

## 2025-07-18 PROCEDURE — 96376 TX/PRO/DX INJ SAME DRUG ADON: CPT

## 2025-07-18 PROCEDURE — 250N000011 HC RX IP 250 OP 636: Performed by: ANESTHESIOLOGY

## 2025-07-18 PROCEDURE — 85025 COMPLETE CBC W/AUTO DIFF WBC: CPT | Performed by: STUDENT IN AN ORGANIZED HEALTH CARE EDUCATION/TRAINING PROGRAM

## 2025-07-18 PROCEDURE — 250N000013 HC RX MED GY IP 250 OP 250 PS 637: Performed by: UROLOGY

## 2025-07-18 PROCEDURE — 99285 EMERGENCY DEPT VISIT HI MDM: CPT | Mod: 25 | Performed by: STUDENT IN AN ORGANIZED HEALTH CARE EDUCATION/TRAINING PROGRAM

## 2025-07-18 PROCEDURE — 999N000141 HC STATISTIC PRE-PROCEDURE NURSING ASSESSMENT: Performed by: UROLOGY

## 2025-07-18 PROCEDURE — 250N000009 HC RX 250: Performed by: UROLOGY

## 2025-07-18 PROCEDURE — 250N000011 HC RX IP 250 OP 636: Performed by: NURSE ANESTHETIST, CERTIFIED REGISTERED

## 2025-07-18 PROCEDURE — 52332 CYSTOSCOPY AND TREATMENT: CPT | Mod: LT | Performed by: UROLOGY

## 2025-07-18 PROCEDURE — 710N000009 HC RECOVERY PHASE 1, LEVEL 1, PER MIN: Performed by: UROLOGY

## 2025-07-18 PROCEDURE — 258N000003 HC RX IP 258 OP 636: Performed by: NURSE ANESTHETIST, CERTIFIED REGISTERED

## 2025-07-18 PROCEDURE — 86140 C-REACTIVE PROTEIN: CPT | Performed by: PHYSICIAN ASSISTANT

## 2025-07-18 PROCEDURE — 83690 ASSAY OF LIPASE: CPT | Performed by: STUDENT IN AN ORGANIZED HEALTH CARE EDUCATION/TRAINING PROGRAM

## 2025-07-18 PROCEDURE — 96375 TX/PRO/DX INJ NEW DRUG ADDON: CPT

## 2025-07-18 DEVICE — URETERAL STENT
Type: IMPLANTABLE DEVICE | Site: URETHRA | Status: FUNCTIONAL
Brand: POLARIS™ ULTRA

## 2025-07-18 RX ORDER — ACETAMINOPHEN 325 MG/1
650 TABLET ORAL EVERY 4 HOURS PRN
Status: DISCONTINUED | OUTPATIENT
Start: 2025-07-18 | End: 2025-07-19 | Stop reason: HOSPADM

## 2025-07-18 RX ORDER — ONDANSETRON 2 MG/ML
INJECTION INTRAMUSCULAR; INTRAVENOUS PRN
Status: DISCONTINUED | OUTPATIENT
Start: 2025-07-18 | End: 2025-07-18

## 2025-07-18 RX ORDER — LEVOTHYROXINE SODIUM 125 UG/1
125 TABLET ORAL DAILY
Status: DISCONTINUED | OUTPATIENT
Start: 2025-07-19 | End: 2025-07-19 | Stop reason: HOSPADM

## 2025-07-18 RX ORDER — PANTOPRAZOLE SODIUM 40 MG/1
40 TABLET, DELAYED RELEASE ORAL 2 TIMES DAILY
Status: DISCONTINUED | OUTPATIENT
Start: 2025-07-19 | End: 2025-07-19 | Stop reason: HOSPADM

## 2025-07-18 RX ORDER — POTASSIUM CITRATE 1080 MG/1
10 TABLET, EXTENDED RELEASE ORAL DAILY
Status: DISCONTINUED | OUTPATIENT
Start: 2025-07-19 | End: 2025-07-19

## 2025-07-18 RX ORDER — FENTANYL CITRATE 50 UG/ML
INJECTION, SOLUTION INTRAMUSCULAR; INTRAVENOUS PRN
Status: DISCONTINUED | OUTPATIENT
Start: 2025-07-18 | End: 2025-07-18

## 2025-07-18 RX ORDER — HYDRALAZINE HYDROCHLORIDE 20 MG/ML
5-10 INJECTION INTRAMUSCULAR; INTRAVENOUS EVERY 10 MIN PRN
Status: DISCONTINUED | OUTPATIENT
Start: 2025-07-18 | End: 2025-07-19 | Stop reason: HOSPADM

## 2025-07-18 RX ORDER — HYDROMORPHONE HYDROCHLORIDE 2 MG/1
2 TABLET ORAL EVERY 4 HOURS PRN
Status: DISCONTINUED | OUTPATIENT
Start: 2025-07-18 | End: 2025-07-19 | Stop reason: HOSPADM

## 2025-07-18 RX ORDER — NICOTINE POLACRILEX 4 MG
15-30 LOZENGE BUCCAL
Status: DISCONTINUED | OUTPATIENT
Start: 2025-07-18 | End: 2025-07-19 | Stop reason: HOSPADM

## 2025-07-18 RX ORDER — FENTANYL CITRATE 50 UG/ML
25 INJECTION, SOLUTION INTRAMUSCULAR; INTRAVENOUS EVERY 5 MIN PRN
Status: DISCONTINUED | OUTPATIENT
Start: 2025-07-18 | End: 2025-07-19 | Stop reason: HOSPADM

## 2025-07-18 RX ORDER — GABAPENTIN 300 MG/1
300 CAPSULE ORAL 2 TIMES DAILY
Status: DISCONTINUED | OUTPATIENT
Start: 2025-07-19 | End: 2025-07-19 | Stop reason: HOSPADM

## 2025-07-18 RX ORDER — LIDOCAINE 40 MG/G
CREAM TOPICAL
Status: DISCONTINUED | OUTPATIENT
Start: 2025-07-18 | End: 2025-07-19

## 2025-07-18 RX ORDER — HYDROXYZINE HYDROCHLORIDE 25 MG/1
25 TABLET, FILM COATED ORAL DAILY PRN
Status: DISCONTINUED | OUTPATIENT
Start: 2025-07-18 | End: 2025-07-19 | Stop reason: HOSPADM

## 2025-07-18 RX ORDER — ONDANSETRON 2 MG/ML
4 INJECTION INTRAMUSCULAR; INTRAVENOUS EVERY 6 HOURS PRN
Status: DISCONTINUED | OUTPATIENT
Start: 2025-07-18 | End: 2025-07-19 | Stop reason: HOSPADM

## 2025-07-18 RX ORDER — SODIUM CHLORIDE 9 MG/ML
INJECTION, SOLUTION INTRAVENOUS CONTINUOUS
Status: ACTIVE | OUTPATIENT
Start: 2025-07-18 | End: 2025-07-19

## 2025-07-18 RX ORDER — CEFTRIAXONE 2 G/1
2 INJECTION, POWDER, FOR SOLUTION INTRAMUSCULAR; INTRAVENOUS ONCE
Status: COMPLETED | OUTPATIENT
Start: 2025-07-18 | End: 2025-07-18

## 2025-07-18 RX ORDER — HYDROMORPHONE HYDROCHLORIDE 1 MG/ML
0.5 INJECTION, SOLUTION INTRAMUSCULAR; INTRAVENOUS; SUBCUTANEOUS EVERY 5 MIN PRN
Status: DISCONTINUED | OUTPATIENT
Start: 2025-07-18 | End: 2025-07-19 | Stop reason: HOSPADM

## 2025-07-18 RX ORDER — CEFAZOLIN SODIUM/WATER 2 G/20 ML
2 SYRINGE (ML) INTRAVENOUS SEE ADMIN INSTRUCTIONS
Status: DISCONTINUED | OUTPATIENT
Start: 2025-07-18 | End: 2025-07-19

## 2025-07-18 RX ORDER — PROCHLORPERAZINE MALEATE 10 MG
10 TABLET ORAL EVERY 6 HOURS PRN
Status: DISCONTINUED | OUTPATIENT
Start: 2025-07-18 | End: 2025-07-19 | Stop reason: HOSPADM

## 2025-07-18 RX ORDER — MAGNESIUM SULFATE HEPTAHYDRATE 40 MG/ML
2 INJECTION, SOLUTION INTRAVENOUS
Status: DISCONTINUED | OUTPATIENT
Start: 2025-07-18 | End: 2025-07-19 | Stop reason: HOSPADM

## 2025-07-18 RX ORDER — HYDROMORPHONE HYDROCHLORIDE 1 MG/ML
0.5 INJECTION, SOLUTION INTRAMUSCULAR; INTRAVENOUS; SUBCUTANEOUS ONCE
Refills: 0 | Status: COMPLETED | OUTPATIENT
Start: 2025-07-18 | End: 2025-07-18

## 2025-07-18 RX ORDER — PROPOFOL 10 MG/ML
INJECTION, EMULSION INTRAVENOUS PRN
Status: DISCONTINUED | OUTPATIENT
Start: 2025-07-18 | End: 2025-07-18

## 2025-07-18 RX ORDER — ONDANSETRON 4 MG/1
4 TABLET, ORALLY DISINTEGRATING ORAL EVERY 6 HOURS PRN
Status: DISCONTINUED | OUTPATIENT
Start: 2025-07-18 | End: 2025-07-19 | Stop reason: HOSPADM

## 2025-07-18 RX ORDER — ACETAMINOPHEN 325 MG/1
975 TABLET ORAL ONCE
Status: COMPLETED | OUTPATIENT
Start: 2025-07-18 | End: 2025-07-18

## 2025-07-18 RX ORDER — ACETAMINOPHEN 650 MG/1
650 SUPPOSITORY RECTAL EVERY 4 HOURS PRN
Status: DISCONTINUED | OUTPATIENT
Start: 2025-07-18 | End: 2025-07-19

## 2025-07-18 RX ORDER — AMOXICILLIN 250 MG
1 CAPSULE ORAL 2 TIMES DAILY PRN
Status: DISCONTINUED | OUTPATIENT
Start: 2025-07-18 | End: 2025-07-19 | Stop reason: HOSPADM

## 2025-07-18 RX ORDER — NALOXONE HYDROCHLORIDE 0.4 MG/ML
0.1 INJECTION, SOLUTION INTRAMUSCULAR; INTRAVENOUS; SUBCUTANEOUS
Status: DISCONTINUED | OUTPATIENT
Start: 2025-07-18 | End: 2025-07-19 | Stop reason: HOSPADM

## 2025-07-18 RX ORDER — CEFAZOLIN SODIUM/WATER 2 G/20 ML
2 SYRINGE (ML) INTRAVENOUS
Status: COMPLETED | OUTPATIENT
Start: 2025-07-18 | End: 2025-07-18

## 2025-07-18 RX ORDER — CALCIUM CARBONATE 500 MG/1
1000 TABLET, CHEWABLE ORAL 4 TIMES DAILY PRN
Status: DISCONTINUED | OUTPATIENT
Start: 2025-07-18 | End: 2025-07-19 | Stop reason: HOSPADM

## 2025-07-18 RX ORDER — ONDANSETRON 2 MG/ML
4 INJECTION INTRAMUSCULAR; INTRAVENOUS EVERY 30 MIN PRN
Status: DISCONTINUED | OUTPATIENT
Start: 2025-07-18 | End: 2025-07-19 | Stop reason: HOSPADM

## 2025-07-18 RX ORDER — HYDROMORPHONE HCL IN WATER/PF 6 MG/30 ML
0.2 PATIENT CONTROLLED ANALGESIA SYRINGE INTRAVENOUS
Status: DISCONTINUED | OUTPATIENT
Start: 2025-07-18 | End: 2025-07-19 | Stop reason: HOSPADM

## 2025-07-18 RX ORDER — AMOXICILLIN 250 MG
2 CAPSULE ORAL 2 TIMES DAILY
Status: DISCONTINUED | OUTPATIENT
Start: 2025-07-18 | End: 2025-07-19 | Stop reason: HOSPADM

## 2025-07-18 RX ORDER — ACETAMINOPHEN 650 MG/1
650 SUPPOSITORY RECTAL ONCE
Status: COMPLETED | OUTPATIENT
Start: 2025-07-18 | End: 2025-07-18

## 2025-07-18 RX ORDER — POLYETHYLENE GLYCOL 3350 17 G/17G
17 POWDER, FOR SOLUTION ORAL DAILY
Status: DISCONTINUED | OUTPATIENT
Start: 2025-07-19 | End: 2025-07-19 | Stop reason: HOSPADM

## 2025-07-18 RX ORDER — LABETALOL HYDROCHLORIDE 5 MG/ML
10 INJECTION, SOLUTION INTRAVENOUS
Status: DISCONTINUED | OUTPATIENT
Start: 2025-07-18 | End: 2025-07-19 | Stop reason: HOSPADM

## 2025-07-18 RX ORDER — METOCLOPRAMIDE HYDROCHLORIDE 5 MG/ML
5 INJECTION INTRAMUSCULAR; INTRAVENOUS ONCE
Status: COMPLETED | OUTPATIENT
Start: 2025-07-18 | End: 2025-07-18

## 2025-07-18 RX ORDER — ALBUTEROL SULFATE 0.83 MG/ML
2.5 SOLUTION RESPIRATORY (INHALATION) EVERY 4 HOURS PRN
Status: DISCONTINUED | OUTPATIENT
Start: 2025-07-18 | End: 2025-07-19 | Stop reason: HOSPADM

## 2025-07-18 RX ORDER — AMOXICILLIN 250 MG
2 CAPSULE ORAL 2 TIMES DAILY PRN
Status: DISCONTINUED | OUTPATIENT
Start: 2025-07-18 | End: 2025-07-19 | Stop reason: HOSPADM

## 2025-07-18 RX ORDER — AMOXICILLIN 250 MG
1 CAPSULE ORAL 2 TIMES DAILY
Status: DISCONTINUED | OUTPATIENT
Start: 2025-07-18 | End: 2025-07-19 | Stop reason: HOSPADM

## 2025-07-18 RX ORDER — ONDANSETRON 4 MG/1
4 TABLET, ORALLY DISINTEGRATING ORAL EVERY 30 MIN PRN
Status: DISCONTINUED | OUTPATIENT
Start: 2025-07-18 | End: 2025-07-19 | Stop reason: HOSPADM

## 2025-07-18 RX ORDER — LIDOCAINE HYDROCHLORIDE 20 MG/ML
INJECTION, SOLUTION INFILTRATION; PERINEURAL PRN
Status: DISCONTINUED | OUTPATIENT
Start: 2025-07-18 | End: 2025-07-18

## 2025-07-18 RX ORDER — KETOROLAC TROMETHAMINE 15 MG/ML
15 INJECTION, SOLUTION INTRAMUSCULAR; INTRAVENOUS
Status: DISCONTINUED | OUTPATIENT
Start: 2025-07-18 | End: 2025-07-19 | Stop reason: HOSPADM

## 2025-07-18 RX ORDER — SODIUM CHLORIDE, SODIUM LACTATE, POTASSIUM CHLORIDE, CALCIUM CHLORIDE 600; 310; 30; 20 MG/100ML; MG/100ML; MG/100ML; MG/100ML
INJECTION, SOLUTION INTRAVENOUS CONTINUOUS PRN
Status: DISCONTINUED | OUTPATIENT
Start: 2025-07-18 | End: 2025-07-18

## 2025-07-18 RX ORDER — CYCLOBENZAPRINE HCL 10 MG
10 TABLET ORAL 3 TIMES DAILY PRN
Status: DISCONTINUED | OUTPATIENT
Start: 2025-07-18 | End: 2025-07-19

## 2025-07-18 RX ORDER — ACETAMINOPHEN 500 MG
1000 TABLET ORAL EVERY 8 HOURS PRN
Status: DISCONTINUED | OUTPATIENT
Start: 2025-07-18 | End: 2025-07-18

## 2025-07-18 RX ORDER — FENTANYL CITRATE 50 UG/ML
50 INJECTION, SOLUTION INTRAMUSCULAR; INTRAVENOUS
Status: DISCONTINUED | OUTPATIENT
Start: 2025-07-18 | End: 2025-07-19

## 2025-07-18 RX ORDER — ALBUTEROL SULFATE 0.83 MG/ML
2.5 SOLUTION RESPIRATORY (INHALATION) ONCE
Status: DISCONTINUED | OUTPATIENT
Start: 2025-07-18 | End: 2025-07-19 | Stop reason: HOSPADM

## 2025-07-18 RX ORDER — SODIUM CHLORIDE, SODIUM LACTATE, POTASSIUM CHLORIDE, CALCIUM CHLORIDE 600; 310; 30; 20 MG/100ML; MG/100ML; MG/100ML; MG/100ML
INJECTION, SOLUTION INTRAVENOUS CONTINUOUS
Status: DISCONTINUED | OUTPATIENT
Start: 2025-07-18 | End: 2025-07-19 | Stop reason: HOSPADM

## 2025-07-18 RX ORDER — TRAZODONE HYDROCHLORIDE 100 MG/1
100 TABLET ORAL AT BEDTIME
Status: DISCONTINUED | OUTPATIENT
Start: 2025-07-18 | End: 2025-07-19

## 2025-07-18 RX ORDER — CEFTRIAXONE 1 G/1
1 INJECTION, POWDER, FOR SOLUTION INTRAMUSCULAR; INTRAVENOUS EVERY 24 HOURS
Status: DISCONTINUED | OUTPATIENT
Start: 2025-07-19 | End: 2025-07-19 | Stop reason: HOSPADM

## 2025-07-18 RX ORDER — FENTANYL CITRATE 50 UG/ML
50 INJECTION, SOLUTION INTRAMUSCULAR; INTRAVENOUS EVERY 5 MIN PRN
Status: DISCONTINUED | OUTPATIENT
Start: 2025-07-18 | End: 2025-07-19 | Stop reason: HOSPADM

## 2025-07-18 RX ORDER — HYDROMORPHONE HCL IN WATER/PF 6 MG/30 ML
0.4 PATIENT CONTROLLED ANALGESIA SYRINGE INTRAVENOUS
Status: DISCONTINUED | OUTPATIENT
Start: 2025-07-18 | End: 2025-07-19 | Stop reason: HOSPADM

## 2025-07-18 RX ORDER — ESCITALOPRAM OXALATE 20 MG/1
20 TABLET ORAL DAILY
Status: DISCONTINUED | OUTPATIENT
Start: 2025-07-19 | End: 2025-07-19 | Stop reason: HOSPADM

## 2025-07-18 RX ORDER — DULOXETIN HYDROCHLORIDE 20 MG/1
20 CAPSULE, DELAYED RELEASE ORAL DAILY
Status: DISCONTINUED | OUTPATIENT
Start: 2025-07-19 | End: 2025-07-19 | Stop reason: HOSPADM

## 2025-07-18 RX ORDER — DEXTROSE MONOHYDRATE 25 G/50ML
25-50 INJECTION, SOLUTION INTRAVENOUS
Status: DISCONTINUED | OUTPATIENT
Start: 2025-07-18 | End: 2025-07-19 | Stop reason: HOSPADM

## 2025-07-18 RX ADMIN — SODIUM CHLORIDE 1000 ML: 0.9 INJECTION, SOLUTION INTRAVENOUS at 18:32

## 2025-07-18 RX ADMIN — Medication 2 G: at 23:12

## 2025-07-18 RX ADMIN — HYDROMORPHONE HYDROCHLORIDE 0.5 MG: 1 INJECTION, SOLUTION INTRAMUSCULAR; INTRAVENOUS; SUBCUTANEOUS at 20:13

## 2025-07-18 RX ADMIN — ONDANSETRON 4 MG: 2 INJECTION INTRAMUSCULAR; INTRAVENOUS at 23:17

## 2025-07-18 RX ADMIN — ACETAMINOPHEN 975 MG: 325 TABLET ORAL at 22:01

## 2025-07-18 RX ADMIN — FENTANYL CITRATE 50 MCG: 50 INJECTION, SOLUTION INTRAMUSCULAR; INTRAVENOUS at 22:53

## 2025-07-18 RX ADMIN — HYDROMORPHONE HYDROCHLORIDE 0.5 MG: 1 INJECTION, SOLUTION INTRAMUSCULAR; INTRAVENOUS; SUBCUTANEOUS at 18:32

## 2025-07-18 RX ADMIN — LIDOCAINE HYDROCHLORIDE 50 MG: 20 INJECTION, SOLUTION INFILTRATION; PERINEURAL at 23:17

## 2025-07-18 RX ADMIN — PROPOFOL 160 MG: 10 INJECTION, EMULSION INTRAVENOUS at 23:17

## 2025-07-18 RX ADMIN — PROPOFOL 40 MG: 10 INJECTION, EMULSION INTRAVENOUS at 23:18

## 2025-07-18 RX ADMIN — SODIUM CHLORIDE, SODIUM LACTATE, POTASSIUM CHLORIDE, AND CALCIUM CHLORIDE: .6; .31; .03; .02 INJECTION, SOLUTION INTRAVENOUS at 23:10

## 2025-07-18 RX ADMIN — METOCLOPRAMIDE 5 MG: 5 INJECTION, SOLUTION INTRAMUSCULAR; INTRAVENOUS at 18:34

## 2025-07-18 RX ADMIN — CEFTRIAXONE 2 G: 2 INJECTION, POWDER, FOR SOLUTION INTRAMUSCULAR; INTRAVENOUS at 18:35

## 2025-07-18 RX ADMIN — FENTANYL CITRATE 100 MCG: 50 INJECTION INTRAMUSCULAR; INTRAVENOUS at 23:17

## 2025-07-18 ASSESSMENT — ACTIVITIES OF DAILY LIVING (ADL)
ADLS_ACUITY_SCORE: 52

## 2025-07-18 NOTE — ED TRIAGE NOTES
Left sided flank pain and abdominal pain for the past week. Patient reports hx of kidney stones.

## 2025-07-18 NOTE — ED PROVIDER NOTES
"  Emergency Department Note      History of Present Illness     Chief Complaint   Flank Pain and Abdominal Pain      HPI   Lynn Thompson is a 60 year old female with history of chronic pancreatitis, post pancreatectomy diabetes, Crohns disease, recurrent kidney stones, hypertension, and anemia who presents to the ED with her  for evaluation of flank and abdominal pain. The patient reports that for the past week she has had a gradual increase in left sided flank pain that radiates into her abdomen. She endorses very severe nausea and has been \"dry heaving\", but has not vomited. Due to the nausea she reports that she has been unable to eat and drink very much over the last few days. She endorses a history of kidney stones,and reports that she has had 6-7 procedures in her past to remove them. She adds that this pain feels very similar to her past experiences and is concerned that she may have another kidney stone/infection. Over this week she states that she has tried to take ibuprofen and Zofran, but has had no relief in symptoms.     Independent Historian   None    Review of External Notes   I reviewed oncology note from 3/26/2025.     Past Medical History     Medical History and Problem List   Benign paroxysmal positional vertigo  Calculus of kidney  Chronic abdominal pain  Chronic pancreatitis  Depression  Dysthymia  Hypertension  Iron deficiency anemia  Post-pancreatectomy diabetes melltius  Sleep apnea  Spasm of sphincter of Oddi  Thyroid nodule  Asplenia   Exocrine pancreatic insufficiency   Anxiety   Recurrent kidney stones   Hypocitruria  Insomnia   GERD    PTSD   Crohns disease     Medications   Flexeril   Cymbalta   Lexapro   Pepcid   Neurontin   Atarax   Topamax   Zoloft   Zantac   Movantik   Bentyl   Lantus pen   Synthroid   Prilosec  Zofran   Desyrel     Surgical History   Past Surgical History:   Procedure Laterality Date    ABDOMINOPLASTY  06/24/2002    Tummy tuck    APPENDECTOMY  1990    " BUNIONECTOMY Right 1998    CBD Stent placement  2002    CBD stent; Dr. Presley     SECTION      CHOLECYSTECTOMY      COLONOSCOPY N/A 2021    Procedure: COLONOSCOPY INCOMPLETE Aborted due to incomplete prep  will need to take additional prep and return tomorrow 21;  Surgeon: Ihsan Saenz MD;  Location: [facility]    COMBINED CYSTOSCOPY, RETROGRADES, URETEROSCOPY, LASER HOLMIUM LITHOTRIPSY URETER(S), INSERT STENT Right 2015    Procedure: COMBINED CYSTOSCOPY, RETROGRADES, URETEROSCOPY, LASER HOLMIUM LITHOTRIPSY URETER(S), INSERT STENT;  Surgeon: Kennedi Aldana MD;  Location: [facility]    COMBINED CYSTOSCOPY, RETROGRADES, URETEROSCOPY, LASER HOLMIUM LITHOTRIPSY URETER(S), INSERT STENT Right 2015    Procedure: COMBINED CYSTOSCOPY, RETROGRADES, URETEROSCOPY, LASER HOLMIUM LITHOTRIPSY URETER(S), INSERT STENT;  Surgeon: Kennedi Aldana MD;  [facility]: [facility]    COSMETIC SURGERY  2002    Tummy tuck    CYSTECTOMY OVARIAN BENIGN Right     CYSTOSCOPY, RETROGRADES, INSERT STENT URETER(S), COMBINED  10/02/2012    Procedure: COMBINED CYSTOSCOPY, RETROGRADES, INSERT STENT URETER(S);  COMBINED CYSTOSCOPY,  , INSERT LEFT STENT URETER;  Surgeon: Johny Baez MD;  Location: [facility]    CYSTOSCOPY, RETROGRADES, INSERT STENT URETER(S), COMBINED Left 2022    Procedure: Cystoscopy with left retrograde pyelogram, left ureteroscopy, thulium laser lithotripsy of left ureteral calculus, stone basketing and left ureteral stent insertion;  Surgeon: Alonzo Rome MD;  Location: [facility]    ESOPHAGOSCOPY, GASTROSCOPY, DUODENOSCOPY (EGD), COMBINED N/A 2018    Procedure: COMBINED ESOPHAGOSCOPY, GASTROSCOPY, DUODENOSCOPY (EGD);  ESOPHAGOSCOPY, GASTROSCOPY, DUODENOSCOPY (EGD)    ;  Surgeon: Tamir Rodgers MD;  Location: [facility]    EXTRACORPOREAL SHOCK WAVE LITHOTRIPSY (ESWL)  10/16/2012    Procedure: EXTRACORPOREAL SHOCK WAVE LITHOTRIPSY (ESWL);  left EXTRACORPOREAL SHOCK WAVE  LITHOTRIPSY (ESWL) ;  Surgeon: Johny Baez MD;  Location: [facility]    Gastric bypass NOS      HERNIA REPAIR  02/2015    HYSTERECTOMY SUPRACERVICAL, BILATERAL SALPINGO-OOPHORECTOMY, COMBINED N/A 02/01/2022    Procedure: Abdominal supracervical hysterectomy, bilateral salpingooophrectomy;  Surgeon: Nicole Rivera MD;  [facility]    INCISION AND DRAINAGE FINGER, COMBINED Left 11/2/2024    Procedure: Left index finger incision and debridement to the level of bone, dorsally, 2cm2. Left index finger proximal interphalangial joint irrigation. Left index finger incision and debridement to the level of the flexor tendon sheath, 3 cm2.;  Surgeon: Aileen Reza MD;  Location: [facility]    IRRIGATION AND DEBRIDEMENT HAND, COMBINED Left 10/30/2020    Procedure: Left hand sharp excisional debridement of skin, subcutaneous tissue and fat with a scalpel, 2 x 1 x 1 cm.;  Surgeon: Demian Renteria MD;  [facility]: [facility]    LAPAROSCOPIC LYSIS ADHESIONS N/A 02/20/2015    Procedure: LAPAROSCOPIC LYSIS ADHESIONS;  Surgeon: Aaron Early MD;  [facility]    LAPAROSCOPIC LYSIS ADHESIONS N/A 12/29/2015    Procedure: LAPAROSCOPIC LYSIS ADHESIONS;  Surgeon: Aaron Early MD;  [facility]    PANCREATECTOMY, TRANSPLANT AUTO ISLET CELL, COMBINED  05/10/2013    Procedure: COMBINED PANCREATECTOMY, TRANSPLANT AUTO ISLET CELL;  Pancreatectomy, Auto Islet Cell Transplant   hernia repair, jejunostomy tube and liver biopsies with Anesthesia General with block;  Surgeon: Aaron Early MD;  [facility]    Partial ileum resection  1992    RECTOPEXY ABDOMINAL N/A 02/01/2022    Procedure: RECTOPEXY, ABDOMINAL;  Surgeon: Uriah Sheridan MD;  [facility]    RELEASE TRIGGER FINGER Left 11/2/2024    Procedure: Left index finger A1 pulley release.;  Surgeon: Aileen Reza MD;  [facility]: [facility]    REPAIR PTOSIS BROW BILATERAL Bilateral 06/09/2020    Procedure: BILATERAL BROW PTOSIS REPAIR;  Surgeon: Aristeo  Denise Curiel MD;  Location: [facility]    SACROCOLPOPEXY, CYSTOSCOPY, COMBINED N/A 02/01/2022    Procedure: Uterosacral ligament suspension, pina colposuspension with Cystoscopy;  Surgeon: Nicole Rivera MD;  [facility]    SIGMOIDOSCOPY FLEXIBLE N/A 02/01/2022    Procedure: SIGMOIDOSCOPY, FLEXIBLE;  Surgeon: Uriah Shreidan MD;  [facility]    Surgery for SBO  2015    TONSILLECTOMY, ADENOIDECTOMY, COMBINED  1997    TRANSPLANT  5/10/13    Pancreatic Auto-Islet Transplant       Physical Exam     Patient Vitals for the past 24 hrs:   BP Temp Temp src Pulse Resp SpO2 Weight   07/19/25 0200 126/69 -- -- 59 -- -- --   07/19/25 0130 126/66 -- -- 60 -- -- --   07/19/25 0115 127/70 -- -- 59 -- 99 % --   07/19/25 0100 112/83 -- -- 62 -- 99 % --   07/19/25 0045 (!) 147/63 -- -- 57 -- 99 % --   07/19/25 0030 (!) 147/74 -- -- 65 -- -- --   07/19/25 0021 (!) 151/80 98.1  F (36.7  C) Oral 56 18 99 % --   07/19/25 0015 (!) 124/32 -- -- -- -- -- --   07/19/25 0000 -- -- -- 62 18 99 % --   07/18/25 2355 119/46 -- -- 65 14 99 % --   07/18/25 2350 -- -- -- 73 19 98 % --   07/18/25 2345 97/81 96.8  F (36  C) -- 71 13 97 % --   07/18/25 2340 109/78 (!) 96.6  F (35.9  C) -- 68 11 100 % --   07/18/25 2336 106/56 97  F (36.1  C) Temporal 70 18 100 % --   07/18/25 2159 (!) 157/109 97  F (36.1  C) Temporal 62 20 98 % --   07/18/25 2131 (!) 161/74 98.4  F (36.9  C) Oral -- 16 97 % --   07/18/25 2045 -- -- -- -- -- 99 % --   07/18/25 2030 -- -- -- -- -- 98 % --   07/18/25 2015 -- -- -- 62 16 98 % --   07/18/25 2010 (!) 151/78 -- -- -- -- -- --   07/18/25 1954 (!) 157/74 -- -- 78 16 96 % --   07/18/25 1842 -- -- -- 88 16 97 % --   07/18/25 1548 (!) 143/70 97.4  F (36.3  C) Temporal 91 16 99 % 70.6 kg (155 lb 10.3 oz)     Physical Exam  General: Awake, alert, in no acute distress   HEENT: Atraumatic   EOM normal   External ears normal   Trachea midline  Neck: Supple, normal ROM  CV: Regular rate, regular rhythm   No  lower extremity edema  2+ radial and DP pulses  PULM: Breath sounds normal bilaterally  No wheezes or rales  ABD: Soft, non-tender, non-distended  Normal bowel sounds   No rebound or guarding   MSK: No gross deformities  NEURO: Alert, no focal deficits  Skin: Warm, dry and intact      Diagnostics     Lab Results   Labs Ordered and Resulted from Time of ED Arrival to Time of ED Departure   BASIC METABOLIC PANEL - Abnormal       Result Value    Sodium 138      Potassium 4.5      Chloride 102      Carbon Dioxide (CO2) 27      Anion Gap 9      Urea Nitrogen 26.3 (*)     Creatinine 0.95      GFR Estimate 67      Calcium 9.5      Glucose 101 (*)    ROUTINE UA WITH MICROSCOPIC REFLEX TO CULTURE - Abnormal    Color Urine Light Yellow      Appearance Urine Slightly Cloudy (*)     Glucose Urine 70 (*)     Bilirubin Urine Negative      Ketones Urine Negative      Specific Gravity Urine 1.007      Blood Urine Large (*)     pH Urine 6.0      Protein Albumin Urine 20 (*)     Urobilinogen Urine Normal      Nitrite Urine Negative      Leukocyte Esterase Urine Large (*)     Bacteria Urine Few (*)     WBC Clumps Urine Present (*)     RBC Urine 23 (*)     WBC Urine >182 (*)     Squamous Epithelials Urine 1     LIPASE - Abnormal    Lipase 5 (*)    HEPATIC FUNCTION PANEL - Normal    Protein Total 7.1      Albumin 4.2      Bilirubin Total 0.2      Alkaline Phosphatase 105      AST 35      ALT 23      Bilirubin Direct <0.08     CRP INFLAMMATION - Normal    CRP Inflammation <3.00     CBC WITH PLATELETS AND DIFFERENTIAL    WBC Count 7.6      RBC Count 4.72      Hemoglobin 13.4      Hematocrit 42.5      MCV 90      MCH 28.4      MCHC 31.5      RDW 14.4      Platelet Count 360      % Neutrophils 50      % Lymphocytes 35      % Monocytes 11      % Eosinophils 2      % Basophils 1      % Immature Granulocytes 0      NRBCs per 100 WBC 0      Absolute Neutrophils 3.8      Absolute Lymphocytes 2.6      Absolute Monocytes 0.9      Absolute  Eosinophils 0.2      Absolute Basophils 0.1      Absolute Immature Granulocytes 0.0      Absolute NRBCs 0.0     RBC AND PLATELET MORPHOLOGY    RBC Morphology Confirmed RBC Indices      Platelet Assessment        Value: Automated Count Confirmed. Platelet morphology is normal.   GLUCOSE MONITOR NURSING POCT   GLUCOSE MONITOR NURSING POCT   URINE CULTURE       Imaging   XR Surgery HEIDI L/T 5 Min Fluoro w Stills   Final Result      CT Abdomen Pelvis w/o Contrast   Final Result   IMPRESSION:    1.  16 mm stone in the left renal pelvis with associated urothelial thickening and periureteral stranding, suspicious for superinfection.   2.  Additional details in the findings.             EKG   None     Independent Interpretation   None    ED Course      Medications Administered   Medications   ceFAZolin Sodium (ANCEF) injection 2 g (has no administration in time range)   cyclobenzaprine (FLEXERIL) tablet 10 mg ( Oral Unhold 7/19/25 0007)   DULoxetine (CYMBALTA) DR capsule 20 mg ( Oral Unhold 7/19/25 0007)   escitalopram (LEXAPRO) tablet 20 mg ( Oral Unhold 7/19/25 0007)   gabapentin (NEURONTIN) capsule 300 mg ( Oral Unhold 7/19/25 0007)   hydrOXYzine HCl (ATARAX) tablet 25 mg ( Oral Unhold 7/19/25 0007)   Insulin Aspart (w/Niacinamide) SOLN 85 Units ( Injection Automatically Held 7/22/25 0800)   insulin glargine (LANTUS PEN) injection 6 Units ( Subcutaneous Automatically Held 7/22/25 0730)   levothyroxine (SYNTHROID/LEVOTHROID) tablet 125 mcg ( Oral Unhold 7/19/25 0007)   omeprazole (PriLOSEC) CR capsule 40 mg ( Oral Unhold 7/19/25 0007)   polyethylene glycol (MIRALAX) powder 17 g ( Oral Unhold 7/19/25 0007)   potassium citrate (UROCIT-K) CR tablet 10 mEq ( Oral Unhold 7/19/25 0007)   traZODone (DESYREL) tablet 100 mg ( Oral Unhold 7/19/25 0007)   lipase-protease-amylase (VIOKACE) 38585-18355-01825 units tablet 5 tablet ( Oral Unhold 7/19/25 0007)   senna-docusate (SENOKOT-S/PERICOLACE) 8.6-50 MG per tablet 1 tablet ( Oral  Unhold 7/19/25 0007)     Or   senna-docusate (SENOKOT-S/PERICOLACE) 8.6-50 MG per tablet 2 tablet ( Oral Unhold 7/19/25 0007)   calcium carbonate (TUMS) chewable tablet 1,000 mg ( Oral Unhold 7/19/25 0007)   glucose gel 15-30 g ( Oral Unhold 7/19/25 0007)     Or   dextrose 50 % injection 25-50 mL ( Intravenous Unhold 7/19/25 0007)     Or   glucagon injection 1 mg ( Subcutaneous Unhold 7/19/25 0007)   sodium chloride 0.9% BOLUS 1,000 mL ( Intravenous Unhold 7/19/25 0007)   sodium chloride 0.9 % infusion ( Intravenous $New Bag 7/19/25 0030)   acetaminophen (TYLENOL) tablet 650 mg ( Oral Unhold 7/19/25 0007)   HYDROmorphone (DILAUDID) half-tab 1 mg ( Oral Unhold 7/19/25 0007)   HYDROmorphone (DILAUDID) tablet 2 mg (2 mg Oral $Given 7/19/25 0222)   HYDROmorphone (DILAUDID) injection 0.2 mg ( Intravenous Unhold 7/19/25 0007)   HYDROmorphone (DILAUDID) injection 0.4 mg ( Intravenous Unhold 7/19/25 0007)   senna-docusate (SENOKOT-S/PERICOLACE) 8.6-50 MG per tablet 1 tablet ( Oral Unhold 7/19/25 0007)     Or   senna-docusate (SENOKOT-S/PERICOLACE) 8.6-50 MG per tablet 2 tablet ( Oral Unhold 7/19/25 0007)   ondansetron (ZOFRAN ODT) ODT tab 4 mg ( Oral Unhold 7/19/25 0007)     Or   ondansetron (ZOFRAN) injection 4 mg ( Intravenous Unhold 7/19/25 0007)   prochlorperazine (COMPAZINE) injection 10 mg ( Intravenous Unhold 7/19/25 0007)     Or   prochlorperazine (COMPAZINE) tablet 10 mg ( Oral Unhold 7/19/25 0007)   insulin aspart (NovoLOG) injection (RAPID ACTING) ( Subcutaneous Not Given 7/19/25 0030)   cefTRIAXone (ROCEPHIN) 1 g vial to attach to  mL bag for ADULTS or NS 50 mL bag for PEDS ( Intravenous Unhold 7/19/25 0007)   fentaNYL (PF) (SUBLIMAZE) injection 50 mcg (50 mcg Intravenous $Given 7/18/25 2253)   albuterol (PROVENTIL) neb solution 2.5 mg (has no administration in time range)   sodium chloride 0.9% BOLUS 1,000 mL (0 mLs Intravenous Stopped 7/18/25 1932)   metoclopramide (REGLAN) injection 5 mg (5 mg  Intravenous $Given 7/18/25 1834)   HYDROmorphone (PF) (DILAUDID) injection 0.5 mg (0.5 mg Intravenous $Given 7/18/25 1832)   cefTRIAXone (ROCEPHIN) 2 g vial to attach to  ml bag for ADULTS or NS 50 ml bag for PEDS (0 g Intravenous Stopped 7/18/25 1931)   HYDROmorphone (PF) (DILAUDID) injection 0.5 mg (0.5 mg Intravenous $Given 7/18/25 2013)   ceFAZolin Sodium (ANCEF) injection 2 g (2 g Intravenous $Given 7/18/25 2312)   acetaminophen (TYLENOL) tablet 975 mg (975 mg Oral $Given 7/18/25 2201)     Or   acetaminophen (TYLENOL) Suppository 650 mg ( Rectal See Alternative 7/18/25 2201)       Procedures   Procedures     Discussion of Management   Admitting Hospitalist, Meliza Alicia PA-C for Dr. De Anda  Urology, Dr. Del Rio    ED Course   ED Course as of 07/19/25 0320   Fri Jul 18, 2025 1816 I obtained history and examined the patient as noted above.    2018 I consulted with Dr. Del Rio from Urology. Discussed patient's history, presentation, findings, and plan of care.    2030 I rechecked and updated the patient.    2036 I consulted with Meliza Alicia PA-C of the hospitalist service. They accepted care of the patient on behalf of Dr. De Anda.        Additional Documentation  None    Medical Decision Making / Diagnosis     CMS Diagnoses: None     [facility]   Lynn Thompson is a 60 year old female here with flank pain.  Urine appears infected.  CT scan shows large stone at the renal pelvis.  Urology consulted who will take for stenting.  Patient is hemodynamically stable, no signs to suggest sepsis at this time.  Received IV fluids, Rocephin, Dilaudid for pain and Reglan for nausea.  Admitted to hospitalist as above.    Disposition   The patient was admitted to the hospital.     Diagnosis     ICD-10-CM    1. Kidney stone  N20.0 Case Request: CYSTOSCOPY, WITH URETERAL STENT INSERTION     Case Request: CYSTOSCOPY, WITH URETERAL STENT INSERTION      2. Complicated UTI (urinary tract infection)  N39.0        3. Kidney stone on left side  N20.0                Scribe Disclosure:  I, Сергей Gama, am serving as a scribe at 6:16 PM on 7/18/2025 to document services personally performed by Marlen Garcia DO based on my observations and the provider's statements to me.        Marlen Garcia,   07/19/25 0320

## 2025-07-19 VITALS
HEART RATE: 68 BPM | SYSTOLIC BLOOD PRESSURE: 140 MMHG | RESPIRATION RATE: 18 BRPM | DIASTOLIC BLOOD PRESSURE: 58 MMHG | BODY MASS INDEX: 26.93 KG/M2 | TEMPERATURE: 98.3 F | OXYGEN SATURATION: 98 % | WEIGHT: 155.65 LBS

## 2025-07-19 DIAGNOSIS — N20.0 KIDNEY STONE: Primary | ICD-10-CM

## 2025-07-19 LAB
ANION GAP SERPL CALCULATED.3IONS-SCNC: 9 MMOL/L (ref 7–15)
BUN SERPL-MCNC: 17.5 MG/DL (ref 8–23)
CALCIUM SERPL-MCNC: 8.9 MG/DL (ref 8.8–10.4)
CHLORIDE SERPL-SCNC: 108 MMOL/L (ref 98–107)
CREAT SERPL-MCNC: 0.91 MG/DL (ref 0.51–0.95)
EGFRCR SERPLBLD CKD-EPI 2021: 71 ML/MIN/1.73M2
ERYTHROCYTE [DISTWIDTH] IN BLOOD BY AUTOMATED COUNT: 14.6 % (ref 10–15)
GLUCOSE BLDC GLUCOMTR-MCNC: 126 MG/DL (ref 70–99)
GLUCOSE BLDC GLUCOMTR-MCNC: 67 MG/DL (ref 70–99)
GLUCOSE BLDC GLUCOMTR-MCNC: 69 MG/DL (ref 70–99)
GLUCOSE BLDC GLUCOMTR-MCNC: 71 MG/DL (ref 70–99)
GLUCOSE BLDC GLUCOMTR-MCNC: 73 MG/DL (ref 70–99)
GLUCOSE BLDC GLUCOMTR-MCNC: 99 MG/DL (ref 70–99)
GLUCOSE SERPL-MCNC: 98 MG/DL (ref 70–99)
HCO3 SERPL-SCNC: 27 MMOL/L (ref 22–29)
HCT VFR BLD AUTO: 41.3 % (ref 35–47)
HGB BLD-MCNC: 13.1 G/DL (ref 11.7–15.7)
MCH RBC QN AUTO: 28.7 PG (ref 26.5–33)
MCHC RBC AUTO-ENTMCNC: 31.7 G/DL (ref 31.5–36.5)
MCV RBC AUTO: 91 FL (ref 78–100)
PLATELET # BLD AUTO: 370 10E3/UL (ref 150–450)
POTASSIUM SERPL-SCNC: 4.4 MMOL/L (ref 3.4–5.3)
RBC # BLD AUTO: 4.56 10E6/UL (ref 3.8–5.2)
SODIUM SERPL-SCNC: 144 MMOL/L (ref 135–145)
WBC # BLD AUTO: 9.3 10E3/UL (ref 4–11)

## 2025-07-19 PROCEDURE — 99239 HOSP IP/OBS DSCHRG MGMT >30: CPT | Performed by: INTERNAL MEDICINE

## 2025-07-19 PROCEDURE — 80048 BASIC METABOLIC PNL TOTAL CA: CPT | Performed by: UROLOGY

## 2025-07-19 PROCEDURE — 85027 COMPLETE CBC AUTOMATED: CPT | Performed by: UROLOGY

## 2025-07-19 PROCEDURE — 258N000003 HC RX IP 258 OP 636: Performed by: UROLOGY

## 2025-07-19 PROCEDURE — 36415 COLL VENOUS BLD VENIPUNCTURE: CPT | Performed by: UROLOGY

## 2025-07-19 PROCEDURE — 250N000013 HC RX MED GY IP 250 OP 250 PS 637: Performed by: UROLOGY

## 2025-07-19 PROCEDURE — 250N000013 HC RX MED GY IP 250 OP 250 PS 637: Performed by: INTERNAL MEDICINE

## 2025-07-19 RX ORDER — ACETAMINOPHEN 650 MG/1
650 SUPPOSITORY RECTAL ONCE
OUTPATIENT
Start: 2025-07-19

## 2025-07-19 RX ORDER — ACETAMINOPHEN 325 MG/1
975 TABLET ORAL ONCE
OUTPATIENT
Start: 2025-07-19 | End: 2025-07-19

## 2025-07-19 RX ORDER — FLUCONAZOLE 100 MG/1
100 TABLET ORAL DAILY
Qty: 3 TABLET | Refills: 0 | Status: SHIPPED | OUTPATIENT
Start: 2025-07-19 | End: 2025-07-22

## 2025-07-19 RX ORDER — ONDANSETRON 2 MG/ML
4 INJECTION INTRAMUSCULAR; INTRAVENOUS EVERY 30 MIN PRN
Status: DISCONTINUED | OUTPATIENT
Start: 2025-07-19 | End: 2025-07-19

## 2025-07-19 RX ORDER — PHENOL 1.4 %
10 AEROSOL, SPRAY (ML) MUCOUS MEMBRANE
COMMUNITY

## 2025-07-19 RX ORDER — ONDANSETRON 4 MG/1
4 TABLET, ORALLY DISINTEGRATING ORAL EVERY 30 MIN PRN
Status: DISCONTINUED | OUTPATIENT
Start: 2025-07-19 | End: 2025-07-19

## 2025-07-19 RX ORDER — OXYCODONE HYDROCHLORIDE 10 MG/1
10 TABLET ORAL
Status: DISCONTINUED | OUTPATIENT
Start: 2025-07-19 | End: 2025-07-19

## 2025-07-19 RX ORDER — HYDROMORPHONE HYDROCHLORIDE 2 MG/1
2 TABLET ORAL EVERY 6 HOURS PRN
Qty: 10 TABLET | Refills: 0 | Status: SHIPPED | OUTPATIENT
Start: 2025-07-19 | End: 2025-07-22

## 2025-07-19 RX ORDER — CEFAZOLIN SODIUM 2 G/50ML
2 SOLUTION INTRAVENOUS
OUTPATIENT
Start: 2025-07-19

## 2025-07-19 RX ORDER — OXYCODONE HYDROCHLORIDE 5 MG/1
5 TABLET ORAL
Status: DISCONTINUED | OUTPATIENT
Start: 2025-07-19 | End: 2025-07-19

## 2025-07-19 RX ORDER — CEFAZOLIN SODIUM 2 G/50ML
2 SOLUTION INTRAVENOUS SEE ADMIN INSTRUCTIONS
OUTPATIENT
Start: 2025-07-19

## 2025-07-19 RX ORDER — NALOXONE HYDROCHLORIDE 0.4 MG/ML
0.1 INJECTION, SOLUTION INTRAMUSCULAR; INTRAVENOUS; SUBCUTANEOUS
Status: DISCONTINUED | OUTPATIENT
Start: 2025-07-19 | End: 2025-07-19

## 2025-07-19 RX ORDER — CIPROFLOXACIN 500 MG/1
500 TABLET, FILM COATED ORAL 2 TIMES DAILY
Qty: 10 TABLET | Refills: 0 | Status: SHIPPED | OUTPATIENT
Start: 2025-07-19 | End: 2025-07-24

## 2025-07-19 RX ADMIN — DULOXETINE HYDROCHLORIDE 20 MG: 20 CAPSULE, DELAYED RELEASE ORAL at 14:01

## 2025-07-19 RX ADMIN — HYDROMORPHONE HYDROCHLORIDE 1 MG: 2 TABLET ORAL at 18:10

## 2025-07-19 RX ADMIN — GABAPENTIN 300 MG: 300 CAPSULE ORAL at 14:00

## 2025-07-19 RX ADMIN — HYDROMORPHONE HYDROCHLORIDE 2 MG: 2 TABLET ORAL at 08:41

## 2025-07-19 RX ADMIN — HYDROMORPHONE HYDROCHLORIDE 2 MG: 2 TABLET ORAL at 14:00

## 2025-07-19 RX ADMIN — SENNOSIDES AND DOCUSATE SODIUM 2 TABLET: 8.6; 5 TABLET ORAL at 08:49

## 2025-07-19 RX ADMIN — PANTOPRAZOLE SODIUM 40 MG: 40 TABLET, DELAYED RELEASE ORAL at 14:00

## 2025-07-19 RX ADMIN — SODIUM CHLORIDE: 9 INJECTION, SOLUTION INTRAVENOUS at 00:30

## 2025-07-19 RX ADMIN — ESCITALOPRAM OXALATE 20 MG: 20 TABLET ORAL at 14:01

## 2025-07-19 RX ADMIN — HYDROMORPHONE HYDROCHLORIDE 2 MG: 2 TABLET ORAL at 02:22

## 2025-07-19 RX ADMIN — PANCRELIPASE 12 TABLET: 10440; 39150; 39150 TABLET ORAL at 14:02

## 2025-07-19 RX ADMIN — LEVOTHYROXINE SODIUM 125 MCG: 0.12 TABLET ORAL at 14:01

## 2025-07-19 ASSESSMENT — ACTIVITIES OF DAILY LIVING (ADL)
ADLS_ACUITY_SCORE: 29

## 2025-07-19 NOTE — ANESTHESIA PROCEDURE NOTES
Airway       Patient location during procedure: OR  Staff -        CRNA: Hannah Gusman APRN CRNA       Performed By: CRNAIndications and Patient Condition       Indications for airway management: sammy-procedural       Induction type:intravenous       Mask difficulty assessment: 1 - vent by mask    Final Airway Details       Final airway type: supraglottic airway    Supraglottic Airway Details        Type: LMA       Brand: I-Gel       LMA size: 4    Post intubation assessment        Placement verified by: capnometry, equal breath sounds and chest rise        Number of attempts at approach: 1       Number of other approaches attempted: 0       Secured with: commercial tube bran       Ease of procedure: easy       Dentition: Unchanged and Intact

## 2025-07-19 NOTE — PLAN OF CARE
"Goal Outcome Evaluation:      Plan of Care Reviewed With: patient    Overall Patient Progress: improvingOverall Patient Progress: improving    Outcome Evaluation: Flank pain managed with oral dilaudid. Voiding, urine strained. Tolerating regular diet, BG 73 and 67 (recheck 126). Discharge today pending urine culture.    /64 (BP Location: Right arm)   Pulse 60   Temp 97.8  F (36.6  C) (Oral)   Resp 18   Wt 70.6 kg (155 lb 10.3 oz)   LMP 12/19/2013   SpO2 97%   BMI 26.93 kg/m       Problem: Adult Inpatient Plan of Care  Goal: Plan of Care Review  Description: The Plan of Care Review/Shift note should be completed every shift.  The Outcome Evaluation is a brief statement about your assessment that the patient is improving, declining, or no change.  This information will be displayed automatically on your shift  note.  Outcome: Progressing  Flowsheets (Taken 7/19/2025 1976)  Outcome Evaluation: Flank pain managed with oral dilaudid. Voiding, urine strained. Tolerating regular diet, BG 73 and 67 (recheck 126). Discharge today pending urine culture.  Plan of Care Reviewed With: patient  Overall Patient Progress: improving  Goal: Patient-Specific Goal (Individualized)  Description: You can add care plan individualizations to a care plan. Examples of Individualization might be:  \"Parent requests to be called daily at 9am for status\", \"I have a hard time hearing out of my right ear\", or \"Do not touch me to wake me up as it startles  me\".  Outcome: Progressing  Goal: Absence of Hospital-Acquired Illness or Injury  Outcome: Progressing  Intervention: Identify and Manage Fall Risk  Recent Flowsheet Documentation  Taken 7/19/2025 0841 by Paty Packer RN  Safety Promotion/Fall Prevention: safety round/check completed  Intervention: Prevent Skin Injury  Recent Flowsheet Documentation  Taken 7/19/2025 0841 by Paty Packer RN  Body Position: position changed independently  Intervention: Prevent and Manage VTE " (Venous Thromboembolism) Risk  Recent Flowsheet Documentation  Taken 7/19/2025 0841 by Paty Packer, RN  VTE Prevention/Management: patient refused intervention  Goal: Optimal Comfort and Wellbeing  Outcome: Progressing  Intervention: Monitor Pain and Promote Comfort  Recent Flowsheet Documentation  Taken 7/19/2025 0841 by Paty Packer, RN  Pain Management Interventions: medication (see MAR)  Goal: Readiness for Transition of Care  Outcome: Progressing

## 2025-07-19 NOTE — CARE PLAN
Discharge instructions given, iv removed. Filled meds given for home use. Patient walked to vehicle with .

## 2025-07-19 NOTE — H&P
[facility]    History and Physical - Hospitalist Service       Date of Admission:  7/18/2025    Assessment & Plan      Lynn Thompson is a 60 year old female admitted on 7/18/2025. She has a PMH significant for GERD, post pancreatomy DM, S/p gastric bypass, chronic pain syndrome, asplenia, BLU, hx of rectopexy, anxiety, hx of recurrent kidney stone who presents with c/o left flank pain for the last 24-48 hrs. She has had previous hx of kidney stone and this pain is similar to previous. She has had mild low grade temp and nausea vomiting. Work up in the ED reveals fairly unremarkable labs with normal WBC, and Cr. UA was grossly positive. CT scan shows 16mm stone in the left renal pelvis with associated stranding c/w infection. She was given rocephin and took to the OR for cysto and stent placement.      #16mm left renal pelvis stone with concern for infection   #Abnl UA suspicious for UTI   - S/p cysto and stent with Dr. CABRERA  - Cont Rocephin   - Await urine cx  - post op care per Urology     #S/p Pancreatomy DM   - Has a insulin pump  - Will hold off pump for now given NPO state and dec po intake   - Place on medium resistance sliding scale insulin for now    - Likely can resume pump tomorrow if magali po ok     #KATLYN   - Likely due to renal stone  - Cont IVF and follow labs   - Avoid nephrotoxins     #Chronic pain syndrome   - Resume atarax, Cymbalta and flexeril and Neurontin     #Depression  - Resume Lexapro     #Hypothyroidism  - Resume synthroid      Diet: NPO for Medical/Clinical Reasons Except for: Meds    DVT Prophylaxis: Low Risk/Ambulatory with no VTE prophylaxis indicated  Gonzalez Catheter: Not present  Lines: None     Cardiac Monitoring: None  Code Status:  FULL     Clinically Significant Risk Factors Present on Admission     Disposition Plan     Medically Ready for Discharge: Anticipated in 2-4 Days         The patient's care was discussed with the Patient.    Meliza Alicia,  PIERRE  Hospitalist Service  [facility]  Securely message with Phonitive - Touchalize (more info)  Text page via [employer] Paging/Directory     ______________________________________________________________________    Chief Complaint   Left flank pain     History is obtained from the patient    History of Present Illness   Lynn Thompson is a 60 year old female admitted on 7/18/2025. She has a PMH significant for GERD, post pancreatomy DM, S/p gastric bypass, chronic pain syndrome, asplenia, BLU, hx of rectopexy, anxiety, hx of recurrent kidney stone who presents with c/o left flank pain for the last 24-48 hrs. She has had previous hx of kidney stone and this pain is similar to previous. She has had mild low grade temp and nausea vomiting. Work up in the ED reveals fairly unremarkable labs with normal WBC, and Cr. UA was grossly positive. CT scan shows 16mm stone in the left renal pelvis with associated stranding c/w infection. She was given rocephin and took to the OR for cysto and stent placement.        Past Medical History    Past Medical History:   Diagnosis Date    Benign paroxysmal positional vertigo     occ.     Calculus of kidney 05/2005    x1 on L side passed, several stones.  Has been tested for oxalate.    Chronic abdominal pain 07/17/2013    Chronic pancreatitis 07/17/2013    Depression     also occ panic spells    Diabetes (H) 5/10/2013    Total Pancreatomy with Auto Islets Transplant    Dyspepsia 06/1999    H. pylori   treated    Dysthymia 03/01/2016    Headaches     still periodic HA's ;  often 5X/week    Hypertension 02/22/2016    Stress related    Iron deficiency anemia 11/2003    relates to gastric bypass    Post-pancreatectomy diabetes melltius 05/17/2013    Sleep apnea     uses splint    Spasm of sphincter of Oddi     surgical + endoscopic stenting of pancreatic duct @ [facility] [place] 5/23/06    Thyroid nodule 11/01/2016       Past Surgical History   Past Surgical History:   Procedure Laterality Date     ABDOMINOPLASTY  2002    Tummy tu    APPENDECTOMY  1990    BUNIONECTOMY Right 1998    CBD Stent placement  2002    CBD stent; Dr. Presley     SECTION      CHOLECYSTECTOMY      COLONOSCOPY N/A 2021    Procedure: COLONOSCOPY INCOMPLETE Aborted due to incomplete prep  will need to take additional prep and return tomorrow 21;  Surgeon: Ihsan Saenz MD;  Location: [facility]    COMBINED CYSTOSCOPY, RETROGRADES, URETEROSCOPY, LASER HOLMIUM LITHOTRIPSY URETER(S), INSERT STENT Right 2015    Procedure: COMBINED CYSTOSCOPY, RETROGRADES, URETEROSCOPY, LASER HOLMIUM LITHOTRIPSY URETER(S), INSERT STENT;  Surgeon: Kennedi Aldana MD;  Location: [facility]    COMBINED CYSTOSCOPY, RETROGRADES, URETEROSCOPY, LASER HOLMIUM LITHOTRIPSY URETER(S), INSERT STENT Right 2015    Procedure: COMBINED CYSTOSCOPY, RETROGRADES, URETEROSCOPY, LASER HOLMIUM LITHOTRIPSY URETER(S), INSERT STENT;  Surgeon: Kennedi Aldana MD;  Location: [facility]    COSMETIC SURGERY  2002    Tummy tuck    CYSTECTOMY OVARIAN BENIGN Right     CYSTOSCOPY, RETROGRADES, INSERT STENT URETER(S), COMBINED  10/02/2012    Procedure: COMBINED CYSTOSCOPY, RETROGRADES, INSERT STENT URETER(S);  COMBINED CYSTOSCOPY,  , INSERT LEFT STENT URETER;  Surgeon: Johny Baez MD;  Location: [facility]    CYSTOSCOPY, RETROGRADES, INSERT STENT URETER(S), COMBINED Left 2022    Procedure: Cystoscopy with left retrograde pyelogram, left ureteroscopy, thulium laser lithotripsy of left ureteral calculus, stone basketing and left ureteral stent insertion;  Surgeon: Alonzo Rome MD;  Location: [facility]    ESOPHAGOSCOPY, GASTROSCOPY, DUODENOSCOPY (EGD), COMBINED N/A 2018    Procedure: COMBINED ESOPHAGOSCOPY, GASTROSCOPY, DUODENOSCOPY (EGD);  ESOPHAGOSCOPY, GASTROSCOPY, DUODENOSCOPY (EGD)    ;  Surgeon: Tamir Rodgers MD;  Location:  GI    EXTRACORPOREAL SHOCK WAVE LITHOTRIPSY (ESWL)  10/16/2012    Procedure:  EXTRACORPOREAL SHOCK WAVE LITHOTRIPSY (ESWL);  left EXTRACORPOREAL SHOCK WAVE LITHOTRIPSY (ESWL) ;  Surgeon: Johny Baez MD;  Location: [facility] OR    Gastric bypass NOS      HERNIA REPAIR  02/2015    HYSTERECTOMY SUPRACERVICAL, BILATERAL SALPINGO-OOPHORECTOMY, COMBINED N/A 02/01/2022    Procedure: Abdominal supracervical hysterectomy, bilateral salpingooophrectomy;  Surgeon: Nicole Rivera MD;  [facility]    INCISION AND DRAINAGE FINGER, COMBINED Left 11/2/2024    Procedure: Left index finger incision and debridement to the level of bone, dorsally, 2cm2. Left index finger proximal interphalangial joint irrigation. Left index finger incision and debridement to the level of the flexor tendon sheath, 3 cm2.;  Surgeon: Aileen Reza MD;  Location: [facility]    IRRIGATION AND DEBRIDEMENT HAND, COMBINED Left 10/30/2020    Procedure: Left hand sharp excisional debridement of skin, subcutaneous tissue and fat with a scalpel, 2 x 1 x 1 cm.;  Surgeon: Demian Renteria MD;  Location: [facility]    LAPAROSCOPIC LYSIS ADHESIONS N/A 02/20/2015    Procedure: LAPAROSCOPIC LYSIS ADHESIONS;  Surgeon: Aaron Early MD;  [facility]    LAPAROSCOPIC LYSIS ADHESIONS N/A 12/29/2015    Procedure: LAPAROSCOPIC LYSIS ADHESIONS;  Surgeon: Aaron Early MD;  [facility]    PANCREATECTOMY, TRANSPLANT AUTO ISLET CELL, COMBINED  05/10/2013    Procedure: COMBINED PANCREATECTOMY, TRANSPLANT AUTO ISLET CELL;  Pancreatectomy, Auto Islet Cell Transplant   hernia repair, jejunostomy tube and liver biopsies with Anesthesia General with block;  Surgeon: Aaron Early MD;  [facility]    Partial ileum resection  1992    RECTOPEXY ABDOMINAL N/A 02/01/2022    Procedure: RECTOPEXY, ABDOMINAL;  Surgeon: Uriah Sheridan MD;  [facility]    RELEASE TRIGGER FINGER Left 11/2/2024    Procedure: Left index finger A1 pulley release.;  Surgeon: Aileen Reza MD;  Location: [facility]    REPAIR PTOSIS BROW BILATERAL  Bilateral 2020    Procedure: BILATERAL BROW PTOSIS REPAIR;  Surgeon: Denise Alberts MD;  Location: [facility]    SACROCOLPOPEXY, CYSTOSCOPY, COMBINED N/A 2022    Procedure: Uterosacral ligament suspension, pina colposuspension with Cystoscopy;  Surgeon: Nicole Rivera MD;  [facility]    SIGMOIDOSCOPY FLEXIBLE N/A 2022    Procedure: SIGMOIDOSCOPY, FLEXIBLE;  Surgeon: Uriah Sheridan MD;  [facility]    Surgery for SBO      TONSILLECTOMY, ADENOIDECTOMY, COMBINED  1997    TRANSPLANT  5/10/13    Pancreatic Auto-Islet Transplant       Prior to Admission Medications   Prior to Admission Medications   Prescriptions Last Dose Informant Patient Reported? Taking?   Continuous Glucose Sensor (DEXCOM G6 SENSOR) MISC   No No   Sig: CHANGE EVERY 10 DAYS   Continuous Glucose Transmitter (DEXCOM G6 TRANSMITTER) MISC   No No   Sig: CHANGE EVERY 90 DAYS   DULoxetine (CYMBALTA) 20 MG capsule   Yes No   Sig: Take 20 mg by mouth daily   FIASP 100 UNIT/ML SOLN   No No   Sig: INJECT 15 UNITS UNDER THE SKIN VIA INSULIN PUMP. MAY USE UP TO 85 UNITS DAILY   Glucagon (GVOKE HYPOPEN 1-PACK) 1 MG/0.2ML pen   No No   Sig: Inject 0.2 mLs (1 mg) subcutaneously once as needed (for hypoglycemia with loss of consciousness).   INSULIN PUMP - OUTPATIENT   Yes No   Sig: Inject subcutaneously continuously. Date Last Updated on chart: 2024  Omnipod 5, type of insulin: Fiasp  Change site every 3 days  Basal Rates in units/hr  7013-4935: 0.15 unit/hr  3783-7797: 0.35 units/hr  ICF (insulin to carb ratio): 10 (1 unit covers 10g carbs)  ISF (insulin sensitivity factor): 100 (1 unit lowers BG by 100mg/dL)  Target B  Active insulin time: 4 hrs   Insulin Aspart, w/Niacinamide, (FIASP) 100 UNIT/ML SOLN   No No   Sig: Inject 85 Units as directed daily. Use to fill insulin pump every 2-3 days, up to 85 units per day, 3 mos supply.   Insulin Disposable Pump (OMNIPOD 5 PODS, GEN 5,) MISC   No No   Sig:  CHANGE EVERY THREE DAYS   [employer] CommunicationsCE 41263-65627 units TABS per tablet   No No   Sig: TAKE 5 TABLETS BY MOUTH WITH MEALS, AND TAKE 3 TABLETS WITH SNACKS. DAILY MAX = 20 TABLETS   acetaminophen (TYLENOL) 500 MG tablet   No No   Sig: Take 2 tablets (1,000 mg) by mouth every 8 hours as needed for mild pain.   blood glucose (CONTOUR NEXT TEST) test strip   No No   Sig: Use to test blood sugar 4-6 times daily or as directed.   blood glucose (NO BRAND SPECIFIED) lancets standard   No No   Sig: Use to test blood sugar 4-6 times daily or as directed.   calcium carbonate 600 mg-vitamin D 400 units (CALTRATE) 600-400 MG-UNIT per tablet   Yes No   Sig: Take 2 tablets by mouth 2 times daily.   cyclobenzaprine (FLEXERIL) 10 MG tablet   Yes No   Sig: Take 10 mg by mouth 3 times daily as needed for muscle spasms.   escitalopram (LEXAPRO) 20 MG tablet   Yes No   Sig: Take 20 mg by mouth daily   famotidine (PEPCID) 40 MG tablet   No No   Sig: TAKE ONE TABLET BY MOUTH TWICE DAILY AS NEEDED for heartburn   gabapentin (NEURONTIN) 300 MG capsule   No No   Si capsule BID.  If still having pain can increase to 1 capsule TID.   hydrOXYzine (ATARAX) 25 MG tablet   Yes No   Sig: Take 25 mg by mouth daily as needed.   insulin glargine (LANTUS PEN) 100 UNIT/ML pen   No No   Sig: Inject 6 units daily in the event of pump failure   levothyroxine (SYNTHROID/LEVOTHROID) 125 MCG tablet   No No   Sig: Take 1 tablet (125 mcg) by mouth daily   loratadine (CLARITIN) 10 MG tablet  Self Yes No   Sig: Take 10 mg by mouth daily.   omeprazole (PRILOSEC) 40 MG DR capsule   No No   Sig: Take 1 capsule (40 mg) by mouth 2 times daily. Take 30-60 minutes before a meal.   ondansetron (ZOFRAN ODT) 4 MG ODT tab   No No   Sig: Take 1 tablet (4 mg) by mouth every 6 hours as needed for nausea or vomiting.   polyethylene glycol (MIRALAX) 17 GM/Dose powder   No No   Sig: Take 17 g by mouth daily.   potassium citrate (UROCIT-K) 10 MEQ (1080 MG) CR tablet   Yes No   Sig:  Take 10 mEq by mouth daily.   traZODone (DESYREL) 100 MG tablet   Yes No   Sig: Take 100 mg by mouth at bedtime.      Facility-Administered Medications: None        Physical Exam   Vital Signs: Temp: 97.4  F (36.3  C) Temp src: Temporal BP: (!) 151/78 Pulse: 62   Resp: 16 SpO2: 99 % O2 Device: None (Room air)    Weight: 155 lbs 10.32 oz    GENERAL:  Comfortable.  PSYCH: pleasant, oriented, No acute distress.  HEENT:  PERRLA. Normal conjunctiva, normal hearing, nasal mucosa and Oropharynx are normal.  NECK:  Supple, no neck vein distention, adenopathy or bruits, normal thyroid.  HEART:  Normal S1, S2 with no murmur, no pericardial rub, gallops or S3 or S4.  LUNGS:  Clear to auscultation, normal Respiratory effort. No wheezing, rales or ronchi.  ABDOMEN:  Soft, no hepatosplenomegaly, normal bowel sounds. Non-tender, non distended.   EXTREMITIES:  No pedal edema, +2 pulses bilateral and equal.  SKIN:  Dry to touch, No rash, wound or ulcerations.  NEUROLOGIC:  CN 2-12 intact, BL 5/5 symmetric upper and lower extremity strength, sensation is intact with no focal deficits.       Medical Decision Making       75 MINUTES SPENT BY ME on the date of service doing chart review, history, exam, documentation & further activities per the note.      Data     I have personally reviewed the following data over the past 24 hrs:    7.6  \   13.4   / 360     138 102 26.3 (H) /  66 (L)   4.5 27 0.95 \     ALT: 23 AST: 35 AP: 105 TBILI: 0.2   ALB: 4.2 TOT PROTEIN: 7.1 LIPASE: 5 (L)     TSH: N/A T4: N/A A1C: 6.9 (H)     Procal: N/A CRP: <3.00 Lactic Acid: N/A         Imaging results reviewed over the past 24 hrs:   Recent Results (from the past 24 hours)   CT Abdomen Pelvis w/o Contrast    Narrative    EXAM: CT ABDOMEN PELVIS W/O CONTRAST  LOCATION: [facility]  DATE: 7/18/2025    INDICATION: left flank pain  COMPARISON: CT abdomen pelvis 8/22/2024  TECHNIQUE: CT scan of the abdomen and pelvis was performed without  IV contrast. Multiplanar reformats were obtained. Dose reduction techniques were used.  CONTRAST: None.    FINDINGS:   LOWER CHEST: Normal.    HEPATOBILIARY: Normal unenhanced liver contours. Cholecystectomy. Pneumobilia from prior biliary intervention/postsurgical anatomy.    PANCREAS: Pancreatectomy.    SPLEEN: Splenectomy.    ADRENAL GLANDS: Normal.    KIDNEYS/BLADDER: 16 mm stone in the left renal pelvis with associated urothelial thickening and periureteral stranding. Similar mild pericystic stranding.    BOWEL: Postsurgical changes of the stomach and proximal colon. No bowel obstruction. Moderate amount of formed stool throughout the colon. No free fluid or free air.    LYMPH NODES: No lymphadenopathy.    VASCULATURE: The abdominal aorta is nonaneurysmal.    PELVIC ORGANS: Hysterectomy.    MUSCULOSKELETAL: No suspicious bone lesions.      Impression    IMPRESSION:   1.  16 mm stone in the left renal pelvis with associated urothelial thickening and periureteral stranding, suspicious for superinfection.  2.  Additional details in the findings.     XR Surgery HEIDI L/T 5 Min Fluoro w Stills    Narrative    This exam was marked as non-reportable because it will not be read by a   radiologist or a [employer] non-radiologist provider.

## 2025-07-19 NOTE — PHARMACY-ADMISSION MEDICATION HISTORY
Pharmacist Admission Medication History    Admission medication history is complete. The information provided in this note is only as accurate as the sources available at the time of the update.    Information Source(s): Patient via in-person, [employer]    Pertinent Information: insulin pump is due to change tomorrow, 7/20/2025, patient will use own supply.    Changes made to PTA medication list:  Added: melatonin  Deleted: cyclobenzaprine, trazodone, potassium citrate, Miralax  Changed: updated insulin pump settings, calcium-vitamin D 2 tablets to 1 tablet bid, Viokace 5 tablets with meals to 6 tablets with meals    Allergies reviewed with patient and updates made in EHR: allergies reviewed on 7/18    Medication History Completed By: Lashae Tran RPH 7/19/2025 12:39 PM    PTA Med List   Medication Sig Last Dose/Taking    acetaminophen (TYLENOL) 500 MG tablet Take 2 tablets (1,000 mg) by mouth every 8 hours as needed for mild pain. More than a month    blood glucose (CONTOUR NEXT TEST) test strip Use to test blood sugar 4-6 times daily or as directed. 7/18/2025    calcium carbonate 600 mg-vitamin D 400 units (CALTRATE) 600-400 MG-UNIT per tablet Take 1 tablet by mouth 2 times daily. 7/18/2025    DULoxetine (CYMBALTA) 20 MG capsule Take 20 mg by mouth daily 7/18/2025 Morning    escitalopram (LEXAPRO) 20 MG tablet Take 20 mg by mouth daily 7/18/2025 Morning    famotidine (PEPCID) 40 MG tablet TAKE ONE TABLET BY MOUTH TWICE DAILY AS NEEDED for heartburn 7/18/2025    FIASP 100 UNIT/ML SOLN INJECT 15 UNITS UNDER THE SKIN VIA INSULIN PUMP. MAY USE UP TO 85 UNITS DAILY 7/18/2025    gabapentin (NEURONTIN) 300 MG capsule 1 capsule BID.  If still having pain can increase to 1 capsule TID. 7/17/2025    Glucagon (GVOKE HYPOPEN 1-PACK) 1 MG/0.2ML pen Inject 0.2 mLs (1 mg) subcutaneously once as needed (for hypoglycemia with loss of consciousness). Past Month    hydrOXYzine (ATARAX) 25 MG tablet Take 25 mg by mouth daily  as needed. More than a month    Insulin Aspart, w/Niacinamide, (FIASP) 100 UNIT/ML SOLN Inject 85 Units as directed daily. Use to fill insulin pump every 2-3 days, up to 85 units per day, 3 mos supply. 2025    insulin glargine (LANTUS PEN) 100 UNIT/ML pen Inject 6 units daily in the event of pump failure More than a month    INSULIN PUMP - OUTPATIENT Inject subcutaneously continuously. Date Last Updated on chart: 2024  Omnipod 5, type of insulin: Fiasp  Change site every 3 days  Basal Rates in units/hr  4610-9150: 0.15 unit/hr  3483-6302: 0.35 units/hr  ICF (insulin to carb ratio): 8 (1 unit covers 8g carbs)  ISF (insulin sensitivity factor): 50 (1 unit lowers BG by 50mg/dL)  Target B  Active insulin time: 4 hrs Taking    levothyroxine (SYNTHROID/LEVOTHROID) 125 MCG tablet Take 1 tablet (125 mcg) by mouth daily 2025    loratadine (CLARITIN) 10 MG tablet Take 10 mg by mouth daily. 2025    Melatonin 10 MG TABS tablet Take 10 mg by mouth nightly as needed for sleep. Taking As Needed    omeprazole (PRILOSEC) 40 MG DR capsule Take 1 capsule (40 mg) by mouth 2 times daily. Take 30-60 minutes before a meal. 2025    ondansetron (ZOFRAN ODT) 4 MG ODT tab Take 1 tablet (4 mg) by mouth every 6 hours as needed for nausea or vomiting. 2025    VIOKACE 29104-08485 units TABS per tablet TAKE 5 TABLETS BY MOUTH WITH MEALS, AND TAKE 3 TABLETS WITH SNACKS. DAILY MAX = 20 TABLETS (Patient taking differently: Take by mouth. 6 tablets with meals, 3 tablets with snacks.) 2025 Noon

## 2025-07-19 NOTE — PROGRESS NOTES
Urology    No events postoperatively she has felt well  Vital signs have been stable and afebrile    Alert and oriented in no acute distress  Urine culture pending    A/P: Okay to discharge home today from a urology standpoint  Please discharge with empiric course of either ciprofloxacin or Bactrim antibiotics  We discussed the upcoming procedure that will be required to remove her stone burden: Cystoscopy with left-sided ureteroscopy, laser lithotripsy stone basketing, use of the steerable ureteroscopic renal evacuation device, left ureteral stent exchange.  Her questions were answered regarding the procedure.  My office will contact her on Monday to get her an outpatient procedure scheduled.

## 2025-07-19 NOTE — ANESTHESIA CARE TRANSFER NOTE
Patient: Lynn Thompson    Procedure: Procedure(s):  CYSTOSCOPY, WITH URETERAL STENT INSERTION       Diagnosis: Kidney stone [N20.0]  Diagnosis Additional Information: No value filed.    Anesthesia Type:   General     Note:    Oropharynx: oropharynx clear of all foreign objects  Level of Consciousness: awake  Oxygen Supplementation: face mask  Level of Supplemental Oxygen (L/min / FiO2): 8  Independent Airway: airway patency satisfactory and stable  Dentition: dentition unchanged  Vital Signs Stable: post-procedure vital signs reviewed and stable  Report to RN Given: handoff report given  Patient transferred to: PACU    Handoff Report: Identifed the Patient, Identified the Reponsible Provider, Reviewed the pertinent medical history, Discussed the surgical course, Reviewed Intra-OP anesthesia mangement and issues during anesthesia, Set expectations for post-procedure period and Allowed opportunity for questions and acknowledgement of understanding      Vitals:  Vitals Value Taken Time   BP     Temp 96.44  F (35.8  C) 07/18/25 23:38   Pulse 69 07/18/25 23:38   Resp 9 07/18/25 23:38   SpO2 100 % 07/18/25 23:38   Vitals shown include unfiled device data.    Electronically Signed By: MITZY Haddad CRNA  July 18, 2025  11:39 PM

## 2025-07-19 NOTE — ANESTHESIA PREPROCEDURE EVALUATION
Anesthesia Pre-Procedure Evaluation    Patient: Lynn Thompson   MRN: [id number] : 1965          Procedure : Procedure(s):  CYSTOSCOPY, WITH URETERAL STENT INSERTION         Past Medical History:   Diagnosis Date    Benign paroxysmal positional vertigo     occ.     Calculus of kidney 05/2005    x1 on L side passed, several stones.  Has been tested for oxalate.    Chronic abdominal pain 2013    Chronic pancreatitis 2013    Depression     also occ panic spells    Diabetes (H) 5/10/2013    Total Pancreatomy with Auto Islets Transplant    Dyspepsia 1999    H. pylori   treated    Dysthymia 2016    Headaches     still periodic HA's ;  often 5X/week    Hypertension 2016    Stress related    Iron deficiency anemia 2003    relates to gastric bypass    Post-pancreatectomy diabetes melltius 2013    Sleep apnea     uses splint    Spasm of sphincter of Oddi     [place] [facility] [place]    Thyroid nodule 2016      Past Surgical History:   Procedure Laterality Date    ABDOMINOPLASTY  2002    Tummy tuck    APPENDECTOMY  1990    BUNIONECTOMY Right 1998    CBD Stent placement  2002    CBD stent; Dr. Presley     SECTION      CHOLECYSTECTOMY      COLONOSCOPY N/A 2021    Procedure: COLONOSCOPY INCOMPLETE Aborted due to incomplete prep  will need to take additional prep and return tomorrow 21;  Surgeon: Ihsan Saenz MD;  Location: [facility]    COMBINED CYSTOSCOPY, RETROGRADES, URETEROSCOPY, LASER HOLMIUM LITHOTRIPSY URETER(S), INSERT STENT Right 2015    Procedure: COMBINED CYSTOSCOPY, RETROGRADES, URETEROSCOPY, LASER HOLMIUM LITHOTRIPSY URETER(S), INSERT STENT;  Surgeon: Kennedi Aldana MD;  Location: [facility]    COMBINED CYSTOSCOPY, RETROGRADES, URETEROSCOPY, LASER HOLMIUM LITHOTRIPSY URETER(S), INSERT STENT Right 2015    Procedure: COMBINED CYSTOSCOPY, RETROGRADES, URETEROSCOPY, LASER HOLMIUM  LITHOTRIPSY URETER(S), INSERT STENT;  Surgeon: Kennedi Aldana MD;  Location: [facility]    COSMETIC SURGERY  6/24/2002    Tummy tuck    CYSTECTOMY OVARIAN BENIGN Right     CYSTOSCOPY, RETROGRADES, INSERT STENT URETER(S), COMBINED  10/02/2012    Procedure: COMBINED CYSTOSCOPY, RETROGRADES, INSERT STENT URETER(S);  COMBINED CYSTOSCOPY,  , INSERT LEFT STENT URETER;  Surgeon: Johny Baez MD;  Location: [facility]    CYSTOSCOPY, RETROGRADES, INSERT STENT URETER(S), COMBINED Left 9/20/2022    Procedure: Cystoscopy with left retrograde pyelogram, left ureteroscopy, thulium laser lithotripsy of left ureteral calculus, stone basketing and left ureteral stent insertion;  Surgeon: Alonzo Rome MD;  Location: [facility]    ESOPHAGOSCOPY, GASTROSCOPY, DUODENOSCOPY (EGD), COMBINED N/A 01/24/2018    Procedure: COMBINED ESOPHAGOSCOPY, GASTROSCOPY, DUODENOSCOPY (EGD);  ESOPHAGOSCOPY, GASTROSCOPY, DUODENOSCOPY (EGD)    ;  Surgeon: Tamir Rodgers MD;  Location: [facility]    EXTRACORPOREAL SHOCK WAVE LITHOTRIPSY (ESWL)  10/16/2012    Procedure: EXTRACORPOREAL SHOCK WAVE LITHOTRIPSY (ESWL);  left EXTRACORPOREAL SHOCK WAVE LITHOTRIPSY (ESWL) ;  Surgeon: Johny Baez MD;  Location: [facility]    Gastric bypass NOS      HERNIA REPAIR  02/2015    HYSTERECTOMY SUPRACERVICAL, BILATERAL SALPINGO-OOPHORECTOMY, COMBINED N/A 02/01/2022    Procedure: Abdominal supracervical hysterectomy, bilateral salpingooophrectomy;  Surgeon: Nicole Rivera MD;  [facility]    INCISION AND DRAINAGE FINGER, COMBINED Left 11/2/2024    Procedure: Left index finger incision and debridement to the level of bone, dorsally, 2cm2. Left index finger proximal interphalangial joint irrigation. Left index finger incision and debridement to the level of the flexor tendon sheath, 3 cm2.;  Surgeon: Aileen Reza MD;  Location: [facility]    IRRIGATION AND DEBRIDEMENT HAND, COMBINED Left 10/30/2020    Procedure: Left hand sharp excisional debridement of  skin, subcutaneous tissue and fat with a scalpel, 2 x 1 x 1 cm.;  Surgeon: Demian Renteria MD;  [facility]: [facility]    LAPAROSCOPIC LYSIS ADHESIONS N/A 02/20/2015    Procedure: LAPAROSCOPIC LYSIS ADHESIONS;  Surgeon: Aaron Early MD;  [facility]    LAPAROSCOPIC LYSIS ADHESIONS N/A 12/29/2015    Procedure: LAPAROSCOPIC LYSIS ADHESIONS;  Surgeon: Aaron Early MD;  [facility]    PANCREATECTOMY, TRANSPLANT AUTO ISLET CELL, COMBINED  05/10/2013    Procedure: COMBINED PANCREATECTOMY, TRANSPLANT AUTO ISLET CELL;  Pancreatectomy, Auto Islet Cell Transplant   hernia repair, jejunostomy tube and liver biopsies with Anesthesia General with block;  Surgeon: Aaron Early MD;  [facility]    Partial ileum resection  1992    RECTOPEXY ABDOMINAL N/A 02/01/2022    Procedure: RECTOPEXY, ABDOMINAL;  Surgeon: Uriah Sheridan MD;  [facility]    RELEASE TRIGGER FINGER Left 11/2/2024    Procedure: Left index finger A1 pulley release.;  Surgeon: Aileen Reza MD;  [facility]: [facility]    REPAIR PTOSIS BROW BILATERAL Bilateral 06/09/2020    Procedure: BILATERAL BROW PTOSIS REPAIR;  Surgeon: Denise Alberts MD;  Location: [facility]    SACROCOLPOPEXY, CYSTOSCOPY, COMBINED N/A 02/01/2022    Procedure: Uterosacral ligament suspension, pina colposuspension with Cystoscopy;  Surgeon: Nicole Rivera MD;  [facility]    SIGMOIDOSCOPY FLEXIBLE N/A 02/01/2022    Procedure: SIGMOIDOSCOPY, FLEXIBLE;  Surgeon: Uriah Sheridan MD;  [facility]    Surgery for SBO  2015    TONSILLECTOMY, ADENOIDECTOMY, COMBINED  1997    TRANSPLANT  5/10/13    Pancreatic Auto-Islet Transplant      Allergies   Allergen Reactions    Corticosteroids Other (See Comments)     All oral, IV and injectable steroids are contraindicated (unless in life threatening situations) in Islet Auto transplant recipients. They can cause irreversible loss of islet cell function. Please contact patient's transplant care  coordinator, Erlinda Multani RN BSN at [phone]/pager: [phone] and endocrinologist prior to administration.      Povidone Iodine Hives     Causes skin to blister    Nsaids      naprosyn = GI upset    Sulfasalazine Nausea and Nausea and Vomiting      Social History     Tobacco Use    Smoking status: Never    Smokeless tobacco: Never   Substance Use Topics    Alcohol use: Yes     Comment: once every 4 months      Wt Readings from Last 1 Encounters:   07/18/25 70.6 kg (155 lb 10.3 oz)        Anesthesia Evaluation   Pt has had prior anesthetic.     No history of anesthetic complications       ROS/MED HX  ENT/Pulmonary:     (+) sleep apnea, doesn't use CPAP,                                      Neurologic:  - neg neurologic ROS     Cardiovascular:     (+)  hypertension- -   -  - -                                      METS/Exercise Tolerance:     Hematologic:     (+)      anemia,          Musculoskeletal:   (+)  arthritis,             GI/Hepatic:     (+) GERD, Asymptomatic on medication,     Inflammatory bowel disease,             Renal/Genitourinary:     (+) renal disease,      Nephrolithiasis ,       Endo: Comment: Type 1 DM 2/2 pancreatectomy   Islet cell transplant    (+) type I DM,         thyroid problem,            Psychiatric/Substance Use:  - neg psychiatric ROS     Infectious Disease:       Malignancy:       Other:              Physical Exam  Airway  Mallampati: I  TM distance: >3 FB  Neck ROM: full  Mouth opening: >= 4 cm    Cardiovascular   Rhythm: regular  Rate: normal rate     Dental   (+) Modest Abnormalities - crowns, retainers, 1 or 2 missing teeth      Pulmonary - normal exam      Neurological - normal exam  She appears awake, alert and oriented x3.    Other Findings       OUTSIDE LABS:  CBC:   Lab Results   Component Value Date    WBC 7.6 07/18/2025    WBC 6.3 04/28/2025    HGB 13.4 07/18/2025    HGB 12.2 04/28/2025    HCT 42.5 07/18/2025    HCT 37.9 04/28/2025     07/18/2025    PLT  403 04/28/2025     BMP:   Lab Results   Component Value Date     07/18/2025     11/21/2024    POTASSIUM 4.5 07/18/2025    POTASSIUM 3.8 11/21/2024    CHLORIDE 102 07/18/2025    CHLORIDE 107 11/21/2024    CO2 27 07/18/2025    CO2 27 11/21/2024    BUN 26.3 (H) 07/18/2025    BUN 15.2 11/21/2024    CR 0.95 07/18/2025    CR 1.07 (H) 11/21/2024     (H) 07/18/2025     (H) 11/21/2024     COAGS:   Lab Results   Component Value Date    PTT 36 05/16/2013    INR 0.97 03/03/2024    FIBR 168 (L) 05/10/2013     POC:   Lab Results   Component Value Date     (H) 04/01/2021    HCG Negative 05/21/2014    HCGS Negative 12/30/2015     HEPATIC:   Lab Results   Component Value Date    ALBUMIN 4.2 07/18/2025    PROTTOTAL 7.1 07/18/2025    ALT 23 07/18/2025    AST 35 07/18/2025    ALKPHOS 105 07/18/2025    BILITOTAL 0.2 07/18/2025     OTHER:   Lab Results   Component Value Date    PH 7.36 05/10/2013    LACT <0.3 11/01/2024    A1C 6.6 (H) 03/20/2025    VALARIE 9.5 07/18/2025    PHOS 4.2 01/05/2023    MAG 1.7 08/22/2024    LIPASE 5 (L) 07/18/2025    AMYLASE 268 (H) 05/11/2013    TSH 0.25 (L) 11/21/2024    T4 1.65 11/21/2024    T3 90 04/24/2024    CRP 21.8 (H) 10/30/2020    SED 10 11/01/2024       Anesthesia Plan    ASA Status:  3, emergent      NPO Status: NPO Appropriate   Anesthesia Type: General.  Airway: supraglottic airway.  Induction: intravenous.  Maintenance: Balanced.   Techniques and Equipment:     - Airway:  Planned airway equipment includes supraglottic airway.     - Monitoring Plan: standard ASA monitoring     Consents    Anesthesia Plan(s) and associated risks, benefits, and realistic alternatives discussed. Questions answered and patient/representative(s) expressed understanding.     - Discussed: anesthesiologist     - Discussed with:  Patient        - Pt is DNR/DNI Status: no DNR     Blood Consent:      - Discussed with: not discussed.     Postoperative Care    Pain management: non-narcotic  analgesics, plan for postoperative opioid use, multimodal analgesia.     Comments:    Other Comments: No steroids 2/2 islet cell transplant               Roger Saunders MD    I have reviewed the pertinent notes and labs in the chart from the past 30 days and (re)examined the patient.  Any updates or changes from those notes are reflected in this note.    Clinically Significant Risk Factors Present on Admission

## 2025-07-19 NOTE — OP NOTE
SURGEON:  Tamir Del Rio MD     PREOPERATIVE DIAGNOSIS:  Left UPJ stone and UTI     POSTOPERATIVE DIAGNOSIS:  Left UPJ stone and UTI     PROCEDURE PERFORMED:  Cystoscopy, left retrograde pyelogram, interpretation of fluoroscopic images. Left ureteral stent placement     ANESTHESIA:  General.     COMPLICATIONS:  None.    INDICATIONS FOR PROCEDURE: This is a 60-year-old woman with a urinary tract infection and obstructing left UPJ stone who now presents for left stent placement    DESCRIPTION OF PROCEDURE: The risks and benefits of the procedure were explained in detail to the patient and informed consent was obtained.  The patient was brought to the operating room and placed supine on the operating room table and underwent general endotracheal anesthetic.  The patient was moved down to the dorsal lithotomy position and was prepped and draped in the standard sterile fashion.  I inserted the 22 [place]awk rigid cystoscope through the urethra into the bladder and I performed cystoscopy.  There were no urothelial abnormalities identified.  I identified the left ureteral orifice and cannulated the orifice with a ureteral catheter.  I performed a retrograde pyelogram.  The stone was visible on  film.  There was hydronephrosis present.  I passed a Glidewire into the left kidney under fluoroscopic guidance and then I backloaded off the ureteral catheter.  I then placed a 6 x 26 double-J ureteral stent over the Glidewire.  I pulled back the wire and a good curl was seen in the renal pelvis under fluoroscopy.  A good curl was seen in the bladder under direct visualization.  The patient tolerated the procedure well without complication.  The patient went to the postanesthetic care unit in good condition.

## 2025-07-19 NOTE — ANESTHESIA POSTPROCEDURE EVALUATION
Patient: Lynn Thompson    Procedure: Procedure(s):  CYSTOSCOPY, WITH URETERAL STENT INSERTION       Anesthesia Type:  General    Note:  Disposition: Admission   Postop Pain Control: Uneventful            Sign Out: Well controlled pain   PONV: No   Neuro/Psych: Uneventful            Sign Out: Acceptable/Baseline neuro status   Airway/Respiratory: Uneventful            Sign Out: Acceptable/Baseline resp. status   CV/Hemodynamics: Uneventful            Sign Out: Acceptable CV status   Other NRE: NONE   DID A NON-ROUTINE EVENT OCCUR? No           Last vitals:  Vitals Value Taken Time   /73 07/19/25 00:00   Temp 96.8  F (36  C) 07/18/25 23:45   Pulse 62 07/19/25 00:02   Resp 15 07/19/25 00:02   SpO2 99 % 07/19/25 00:02   Vitals shown include unfiled device data.    Electronically Signed By: Roger Saunders MD  July 19, 2025  12:13 AM

## 2025-07-19 NOTE — PLAN OF CARE
"  Problem: Adult Inpatient Plan of Care  Goal: Plan of Care Review  Description: The Plan of Care Review/Shift note should be completed every shift.  The Outcome Evaluation is a brief statement about your assessment that the patient is improving, declining, or no change.  This information will be displayed automatically on your shift  note.  Outcome: Progressing  Flowsheets (Taken 7/19/2025 0553)  Outcome Evaluation: Independent in room, pain controlled with PRN meds, voided, NS @ 100ml/hr, hypoglycemia upon admission but fixed after meal box.  Plan of Care Reviewed With: patient  Overall Patient Progress: improving  Goal: Patient-Specific Goal (Individualized)  Description: You can add care plan individualizations to a care plan. Examples of Individualization might be:  \"Parent requests to be called daily at 9am for status\", \"I have a hard time hearing out of my right ear\", or \"Do not touch me to wake me up as it startles  me\".  Outcome: Progressing  Goal: Absence of Hospital-Acquired Illness or Injury  Outcome: Progressing  Intervention: Identify and Manage Fall Risk  Recent Flowsheet Documentation  Taken 7/19/2025 0030 by Lorenza Hu RN  Safety Promotion/Fall Prevention: activity supervised  Goal: Optimal Comfort and Wellbeing  Outcome: Progressing  Goal: Readiness for Transition of Care  Outcome: Progressing  Intervention: Mutually Develop Transition Plan  Recent Flowsheet Documentation  Taken 7/19/2025 0000 by Lorenza Hu RN  Equipment Currently Used at Home: none     Problem: Comorbidity Management  Goal: Blood Glucose Levels Within Targeted Range  Outcome: Progressing  Intervention: Monitor and Manage Glycemia  Recent Flowsheet Documentation  Taken 7/19/2025 0030 by Lorenza Hu RN  Medication Review/Management: medications reviewed  Goal: Blood Pressure in Desired Range  Outcome: Progressing  Intervention: Maintain Blood Pressure Management  Recent Flowsheet Documentation  Taken 7/19/2025 0030 by Fritz" SATISH Britt  Medication Review/Management: medications reviewed     Problem: Pain Acute  Goal: Optimal Pain Control and Function  Outcome: Progressing  Intervention: Prevent or Manage Pain  Recent Flowsheet Documentation  Taken 7/19/2025 0030 by Lorenza Hu RN  Medication Review/Management: medications reviewed   Goal Outcome Evaluation:      Plan of Care Reviewed With: patient    Overall Patient Progress: improvingOverall Patient Progress: improving    Outcome Evaluation: Independent in room, pain controlled with PRN meds, voided, NS @ 100ml/hr, hypoglycemia upon admission but fixed after meal box.

## 2025-07-19 NOTE — ED NOTES
[facility]  ED Nurse Handoff Report    ED Chief complaint: Flank Pain and Abdominal Pain  . ED Diagnosis:   Final diagnoses:   Complicated UTI (urinary tract infection)   Kidney stone on left side       Allergies:   Allergies   Allergen Reactions    Corticosteroids Other (See Comments)     All oral, IV and injectable steroids are contraindicated (unless in life threatening situations) in Islet Auto transplant recipients. They can cause irreversible loss of islet cell function. Please contact patient's transplant care coordinator, Erlinda Multani RN BSN at [phone]/pager: [phone] and endocrinologist prior to administration.      Povidone Iodine Hives     Causes skin to blister    Nsaids      naprosyn = GI upset    Sulfasalazine Nausea and Nausea and Vomiting       Code Status: Full Code    Activity level - Baseline/Home:  independent.  Activity Level - Current:   standby.   Lift room needed: No.   Bariatric: No   Needed: No   Isolation: No.   Infection: Not Applicable.     Respiratory status: Room air    Vital Signs (within 30 minutes):   Vitals:    07/18/25 1842 07/18/25 1954 07/18/25 2010 07/18/25 2015   BP:  (!) 157/74 (!) 151/78    Pulse: 88 78  62   Resp: 16 16  16   Temp:       TempSrc:       SpO2: 97% 96%  98%   Weight:           Cardiac Rhythm:  ,      Pain level:    Patient confused: No.   Patient Falls Risk: nonskid shoes/slippers when out of bed, arm band in place, and patient and family education.   Elimination Status: Has voided     Patient Report - Initial Complaint: Left flank pain.   Focused Assessment: 60 year old female with history of chronic pancreatitis, post pancreatectomy diabetes, Crohns disease, recurrent kidney stones, hypertension, and anemia who presents to the ED with her  for evaluation of flank and abdominal pain. The patient reports that for the past week she has had a gradual increase in left sided flank pain that radiates into her  "abdomen. She endorses very severe nausea and has been \"dry heaving\", but has not vomited. Due to the nausea she reports that she has been unable to eat and drink very much over the last few days. She endorses a history of kidney stones,and reports that she has had 6-7 procedures in her past to remove them. She adds that this pain feels very similar to her past experiences and is concerned that she may have another kidney stone/infection. Over this week she states that she has tried to take ibuprofen and Zofran, but has had no relief in symptoms.         Abnormal Results:   Labs Ordered and Resulted from Time of ED Arrival to Time of ED Departure   BASIC METABOLIC PANEL - Abnormal       Result Value    Sodium 138      Potassium 4.5      Chloride 102      Carbon Dioxide (CO2) 27      Anion Gap 9      Urea Nitrogen 26.3 (*)     Creatinine 0.95      GFR Estimate 67      Calcium 9.5      Glucose 101 (*)    ROUTINE UA WITH MICROSCOPIC REFLEX TO CULTURE - Abnormal    Color Urine Light Yellow      Appearance Urine Slightly Cloudy (*)     Glucose Urine 70 (*)     Bilirubin Urine Negative      Ketones Urine Negative      Specific Gravity Urine 1.007      Blood Urine Large (*)     pH Urine 6.0      Protein Albumin Urine 20 (*)     Urobilinogen Urine Normal      Nitrite Urine Negative      Leukocyte Esterase Urine Large (*)     Bacteria Urine Few (*)     WBC Clumps Urine Present (*)     RBC Urine 23 (*)     WBC Urine >182 (*)     Squamous Epithelials Urine 1     LIPASE - Abnormal    Lipase 5 (*)    HEPATIC FUNCTION PANEL - Normal    Protein Total 7.1      Albumin 4.2      Bilirubin Total 0.2      Alkaline Phosphatase 105      AST 35      ALT 23      Bilirubin Direct <0.08     CBC WITH PLATELETS AND DIFFERENTIAL    WBC Count 7.6      RBC Count 4.72      Hemoglobin 13.4      Hematocrit 42.5      MCV 90      MCH 28.4      MCHC 31.5      RDW 14.4      Platelet Count 360      % Neutrophils 50      % Lymphocytes 35      % Monocytes " 11      % Eosinophils 2      % Basophils 1      % Immature Granulocytes 0      NRBCs per 100 WBC 0      Absolute Neutrophils 3.8      Absolute Lymphocytes 2.6      Absolute Monocytes 0.9      Absolute Eosinophils 0.2      Absolute Basophils 0.1      Absolute Immature Granulocytes 0.0      Absolute NRBCs 0.0     RBC AND PLATELET MORPHOLOGY    RBC Morphology Confirmed RBC Indices      Platelet Assessment        Value: Automated Count Confirmed. Platelet morphology is normal.   URINE CULTURE        CT Abdomen Pelvis w/o Contrast   Final Result   IMPRESSION:    1.  16 mm stone in the left renal pelvis with associated urothelial thickening and periureteral stranding, suspicious for superinfection.   2.  Additional details in the findings.             Treatments provided: Labs, CT, see MAR  Family Comments: Spouse at bedside.   OBS brochure/video discussed/provided to patient:  N/A  ED Medications:   Medications   sodium chloride 0.9% BOLUS 1,000 mL (0 mLs Intravenous Stopped 7/18/25 1932)   metoclopramide (REGLAN) injection 5 mg (5 mg Intravenous $Given 7/18/25 1834)   HYDROmorphone (PF) (DILAUDID) injection 0.5 mg (0.5 mg Intravenous $Given 7/18/25 1832)   cefTRIAXone (ROCEPHIN) 2 g vial to attach to  ml bag for ADULTS or NS 50 ml bag for PEDS (0 g Intravenous Stopped 7/18/25 1931)   HYDROmorphone (PF) (DILAUDID) injection 0.5 mg (0.5 mg Intravenous $Given 7/18/25 2013)       Drips infusing:  No  For the majority of the shift this patient was Green.   Interventions performed were N/A.    Sepsis treatment initiated: No    Cares/treatment/interventions/medications to be completed following ED care: Plan for OR for ureter stent    ED Nurse Name: Erin Amador RN  8:38 PM

## 2025-07-19 NOTE — CONSULTS
[facility] Consultation by [facility]    [unfilled] MRN# [id number]   Age: 60 year old YOB: 1965     Date of Admission:  7/18/2025    Reason for consult: Left UPJ stone               Level of consult: Consult, follow and place orders           Assessment and Plan:   Assessment:   Left UPJ stone, urinary tract infection      Plan:   Due to the presence of an obstructing stone and urinary tract infection I recommended that we proceed the operating at this time for cystoscopy with left ureteral stent placement.  We discussed stent placement in detail and she wishes to proceed.  Will proceed to the operating at this time.  She will be admitted to the hospital postoperatively for monitoring.    Tamir Del Rio M.D.  [facility]  [phone]                 Chief Complaint:        History is obtained from the patient and EMR.         History of Present Illness:   This patient is a 60 year old female admitted with a left ureteral stone.  She has a prior history of stones.  She came to the emergency department complaining of left flank pain and nausea and vomiting.  She has required procedures previously for stone removal.    On exam she is currently alert and oriented and in no acute distress.    I personally reviewed her CT scan images.  Large 16mm stone at the left UPJ causing obstruction.    Urinalysis shows pyuria.  Serum WBC 7.6.                  Past Medical History:     Past Medical History:   Diagnosis Date    Benign paroxysmal positional vertigo     occ.     Calculus of kidney 05/2005    x1 on L side passed, several stones.  Has been tested for oxalate.    Chronic abdominal pain 07/17/2013    Chronic pancreatitis 07/17/2013    Depression     also occ panic spells    Diabetes (H) 5/10/2013    Total Pancreatomy with Auto Islets Transplant    Dyspepsia 06/1999    H. pylori   treated    Dysthymia 03/01/2016    Headaches     still periodic HA's ;  often 5X/week    Hypertension 02/22/2016     Stress related    Iron deficiency anemia 2003    relates to gastric bypass    Post-pancreatectomy diabetes melltius 2013    Sleep apnea     uses splint    Spasm of sphincter of Oddi     [place] [facility] [place]    Thyroid nodule 2016             Past Surgical History:     Past Surgical History:   Procedure Laterality Date    ABDOMINOPLASTY  2002    Tummy tuck    APPENDECTOMY  1990    BUNIONECTOMY Right 1998    CBD Stent placement  2002    CBD stent; Dr. Presley     SECTION      CHOLECYSTECTOMY      COLONOSCOPY N/A 2021    Procedure: COLONOSCOPY INCOMPLETE Aborted due to incomplete prep  will need to take additional prep and return tomorrow 21;  Surgeon: Ihsan Saenz MD;  Location: [facility]    COMBINED CYSTOSCOPY, RETROGRADES, URETEROSCOPY, LASER HOLMIUM LITHOTRIPSY URETER(S), INSERT STENT Right 2015    Procedure: COMBINED CYSTOSCOPY, RETROGRADES, URETEROSCOPY, LASER HOLMIUM LITHOTRIPSY URETER(S), INSERT STENT;  Surgeon: Kennedi Aldana MD;  Location: [facility]    COMBINED CYSTOSCOPY, RETROGRADES, URETEROSCOPY, LASER HOLMIUM LITHOTRIPSY URETER(S), INSERT STENT Right 2015    Procedure: COMBINED CYSTOSCOPY, RETROGRADES, URETEROSCOPY, LASER HOLMIUM LITHOTRIPSY URETER(S), INSERT STENT;  Surgeon: Kennedi Aldana MD;  Location: [facility]    COSMETIC SURGERY  2002    Tummy tuck    CYSTECTOMY OVARIAN BENIGN Right     CYSTOSCOPY, RETROGRADES, INSERT STENT URETER(S), COMBINED  10/02/2012    Procedure: COMBINED CYSTOSCOPY, RETROGRADES, INSERT STENT URETER(S);  COMBINED CYSTOSCOPY,  , INSERT LEFT STENT URETER;  Surgeon: Johny Baez MD;  Location: [facility]    CYSTOSCOPY, RETROGRADES, INSERT STENT URETER(S), COMBINED Left 2022    Procedure: Cystoscopy with left retrograde pyelogram, left ureteroscopy, thulium laser lithotripsy of left ureteral calculus, stone basketing and left ureteral stent insertion;   Surgeon: Alonzo Rome MD;  Location: [facility]    ESOPHAGOSCOPY, GASTROSCOPY, DUODENOSCOPY (EGD), COMBINED N/A 01/24/2018    Procedure: COMBINED ESOPHAGOSCOPY, GASTROSCOPY, DUODENOSCOPY (EGD);  ESOPHAGOSCOPY, GASTROSCOPY, DUODENOSCOPY (EGD)    ;  Surgeon: Tamir Rodgers MD;  Location: [facility]    EXTRACORPOREAL SHOCK WAVE LITHOTRIPSY (ESWL)  10/16/2012    Procedure: EXTRACORPOREAL SHOCK WAVE LITHOTRIPSY (ESWL);  left EXTRACORPOREAL SHOCK WAVE LITHOTRIPSY (ESWL) ;  Surgeon: Johny Baez MD;  Location: [facility]    Gastric bypass NOS      HERNIA REPAIR  02/2015    HYSTERECTOMY SUPRACERVICAL, BILATERAL SALPINGO-OOPHORECTOMY, COMBINED N/A 02/01/2022    Procedure: Abdominal supracervical hysterectomy, bilateral salpingooophrectomy;  Surgeon: Nicole Rivera MD;  [facility]    INCISION AND DRAINAGE FINGER, COMBINED Left 11/2/2024    Procedure: Left index finger incision and debridement to the level of bone, dorsally, 2cm2. Left index finger proximal interphalangial joint irrigation. Left index finger incision and debridement to the level of the flexor tendon sheath, 3 cm2.;  Surgeon: Aileen Reza MD;  Location:  OR    IRRIGATION AND DEBRIDEMENT HAND, COMBINED Left 10/30/2020    Procedure: Left hand sharp excisional debridement of skin, subcutaneous tissue and fat with a scalpel, 2 x 1 x 1 cm.;  Surgeon: Demian Renteria MD;  Location: [facility]    LAPAROSCOPIC LYSIS ADHESIONS N/A 02/20/2015    Procedure: LAPAROSCOPIC LYSIS ADHESIONS;  Surgeon: Aaron Early MD;  [facility]    LAPAROSCOPIC LYSIS ADHESIONS N/A 12/29/2015    Procedure: LAPAROSCOPIC LYSIS ADHESIONS;  Surgeon: Aaron Early MD;  [facility]    PANCREATECTOMY, TRANSPLANT AUTO ISLET CELL, COMBINED  05/10/2013    Procedure: COMBINED PANCREATECTOMY, TRANSPLANT AUTO ISLET CELL;  Pancreatectomy, Auto Islet Cell Transplant   hernia repair, jejunostomy tube and liver biopsies with Anesthesia General with block;  Surgeon: Aaron Early  MD Triny;  [facility]    Partial ileum resection      RECTOPEXY ABDOMINAL N/A 2022    Procedure: RECTOPEXY, ABDOMINAL;  Surgeon: Uriah Sheridan MD;  [facility]    RELEASE TRIGGER FINGER Left 2024    Procedure: Left index finger A1 pulley release.;  Surgeon: Aileen Reza MD;  Location: [facility]    REPAIR PTOSIS BROW BILATERAL Bilateral 2020    Procedure: BILATERAL BROW PTOSIS REPAIR;  Surgeon: Denise Alberts MD;  Location: [facility]    SACROCOLPOPEXY, CYSTOSCOPY, COMBINED N/A 2022    Procedure: Uterosacral ligament suspension, pina colposuspension with Cystoscopy;  Surgeon: Nicole Rivera MD;  [facility]    SIGMOIDOSCOPY FLEXIBLE N/A 2022    Procedure: SIGMOIDOSCOPY, FLEXIBLE;  Surgeon: Uriah Sheridan MD;  [facility]    Surgery for SBO      TONSILLECTOMY, ADENOIDECTOMY, COMBINED  1997    TRANSPLANT  5/10/13    Pancreatic Auto-Islet Transplant             Social History:     Social History     Tobacco Use    Smoking status: Never    Smokeless tobacco: Never   Substance Use Topics    Alcohol use: Yes     Comment: once every 4 months             Family History:     Family History   Problem Relation Age of Onset    Family History Negative Mother     Respiratory Father         COPD;  at 69    Genitourinary Problems Father         kidney stones    Substance Abuse Father     Depression Father     Asthma Father     Heart Disease Paternal Grandfather         M.I.    Coronary Artery Disease Paternal Grandfather     Hyperlipidemia Paternal Grandfather     Genitourinary Problems Brother         multiple brothers with kidney stones    Gastrointestinal Disease Maternal Grandmother         undiagnosed 'gut' issues    Coronary Artery Disease Maternal Grandfather     Hyperlipidemia Maternal Grandfather     Cerebrovascular Disease Paternal Grandmother         At the age of 103    Anxiety Disorder Paternal Grandmother     Osteoporosis  Paternal Grandmother     Anxiety Disorder Son     Anxiety Disorder Daughter     Asthma Daughter     Colon Cancer No family hx of      Family history reviewed.             Allergies:     Allergies   Allergen Reactions    Corticosteroids Other (See Comments)     All oral, IV and injectable steroids are contraindicated (unless in life threatening situations) in Islet Auto transplant recipients. They can cause irreversible loss of islet cell function. Please contact patient's transplant care coordinator, Erlinda Multani RN BSN at [phone]/pager: [phone] and endocrinologist prior to administration.      Povidone Iodine Hives     Causes skin to blister    Nsaids      naprosyn = GI upset    Sulfasalazine Nausea and Nausea and Vomiting             Medications:     Current Facility-Administered Medications   Medication Dose Route Frequency Provider Last Rate Last Admin    [Auto Hold] acetaminophen (TYLENOL) tablet 650 mg  650 mg Oral Q4H PRN Alicia, Meliza Ferris PA-C        Or    [Auto Hold] acetaminophen (TYLENOL) Suppository 650 mg  650 mg Rectal Q4H PRN Alicia, Meliza Ferris PA-C        [Auto Hold] calcium carbonate (TUMS) chewable tablet 1,000 mg  1,000 mg Oral 4x Daily PRN Alicia, Meliza Ferris PA-C        ceFAZolin Sodium (ANCEF) injection 2 g  2 g Intravenous Pre-Op/Pre-procedure x 1 dose Tamir Del Rio MD        ceFAZolin Sodium (ANCEF) injection 2 g  2 g Intravenous See Admin Instructions Tamir Del Rio MD        [Auto Hold] cefTRIAXone (ROCEPHIN) 1 g vial to attach to  mL bag for ADULTS or NS 50 mL bag for PEDS  1 g Intravenous Q24H Alicia, Meliza Ferris PA-C        [Auto Hold] cyclobenzaprine (FLEXERIL) tablet 10 mg  10 mg Oral TID PRN Alicia, Meliza Ferris PA-C        [Auto Hold] glucose gel 15-30 g  15-30 g Oral Q15 Min PRN Alicia, Meliza Ferris PA-C        Or    [Auto Hold] dextrose 50 % injection 25-50 mL  25-50 mL Intravenous Q15 Min PRN Alicia, Meliza Ferris PA-C        Or    [Auto  Hold] glucagon injection 1 mg  1 mg Subcutaneous Q15 Min PRN Alicia, Meliza Ferris PA-C        [Auto Hold] DULoxetine (CYMBALTA) DR capsule 20 mg  20 mg Oral Daily Alicia, Meliza Ferris PA-C        [Auto Hold] escitalopram (LEXAPRO) tablet 20 mg  20 mg Oral Daily Alicia, Meliza Ferris PA-C        [Auto Hold] gabapentin (NEURONTIN) capsule 300 mg  300 mg Oral BID Alicia, Meliza Ferris PA-C        [Auto Hold] HYDROmorphone (DILAUDID) half-tab 1 mg  1 mg Oral Q4H PRN Alicia, Meliza Ferris PA-C        [Auto Hold] HYDROmorphone (DILAUDID) injection 0.2 mg  0.2 mg Intravenous Q2H PRN Alicia, Meliza Ferris PA-C        [Auto Hold] HYDROmorphone (DILAUDID) injection 0.4 mg  0.4 mg Intravenous Q2H PRN Alicia, Meliza Ferris PA-C        [Auto Hold] HYDROmorphone (DILAUDID) tablet 2 mg  2 mg Oral Q4H PRN Alicia, Meliza Ferris PA-C        [Auto Hold] hydrOXYzine HCl (ATARAX) tablet 25 mg  25 mg Oral Daily PRN Alicia, Meliza Ferris PA-C        [Auto Hold] insulin aspart (NovoLOG) injection (RAPID ACTING)  1-7 Units Subcutaneous Q4H Alicia, Meliza Ferris PA-C        [Held by provider] Insulin Aspart (w/Niacinamide) SOLN 85 Units  85 Units Injection Daily Alicia, Meliza Ferris PA-C        [Held by provider] insulin glargine (LANTUS PEN) injection 6 Units  6 Units Subcutaneous QAM AC Alicia, Meliza Ferris PA-C        [Auto Hold] levothyroxine (SYNTHROID/LEVOTHROID) tablet 125 mcg  125 mcg Oral Daily Alicia, Meliza Ferris PA-C        [Auto Hold] lidocaine (LMX4) cream   Topical Q1H PRN Alicia, Meliza Ferris PA-C        [Auto Hold] lidocaine 1 % 0.1-1 mL  0.1-1 mL Other Q1H PRN Alicia, Meliza Ferris PA-C        [Auto Hold] lipase-protease-amylase (VIOKACE) 79271-612[address]-41479 units tablet 5 tablet  5 tablet Oral TID w/meals Alicia, Meliza Ferris PA-C        [Auto Hold] omeprazole (PriLOSEC) CR capsule 40 mg  40 mg Oral BID Meliza Alicia PA-C        [Auto Hold] ondansetron (ZOFRAN ODT) ODT tab 4 mg  4 mg Oral Q6H PRN Meliza Alicia PA-C        Or    [Auto  Hold] ondansetron (ZOFRAN) injection 4 mg  4 mg Intravenous Q6H PRN Alicia, Meliza Ferris PA-C        [Auto Hold] polyethylene glycol (MIRALAX) powder 17 g  17 g Oral Daily Alicia, Meliza Ferris PA-C        [Auto Hold] potassium citrate (UROCIT-K) CR tablet 10 mEq  10 mEq Oral Daily Alicia, Meliza Ferris PA-C        [Auto Hold] prochlorperazine (COMPAZINE) injection 10 mg  10 mg Intravenous Q6H PRN Alicia, Meliza Ferris PA-C        Or    [Auto Hold] prochlorperazine (COMPAZINE) tablet 10 mg  10 mg Oral Q6H PRN Alicia, Meliza Ferris PA-C        [Auto Hold] senna-docusate (SENOKOT-S/PERICOLACE) 8.6-50 MG per tablet 1 tablet  1 tablet Oral BID PRN Alicia, Meliza Ferris PA-C        Or    [Auto Hold] senna-docusate (SENOKOT-S/PERICOLACE) 8.6-50 MG per tablet 2 tablet  2 tablet Oral BID PRN Alicia, Meliza Ferris PA-C        [Auto Hold] senna-docusate (SENOKOT-S/PERICOLACE) 8.6-50 MG per tablet 1 tablet  1 tablet Oral BID Alicia, Meliza Ferris PA-C        Or    [Auto Hold] senna-docusate (SENOKOT-S/PERICOLACE) 8.6-50 MG per tablet 2 tablet  2 tablet Oral BID Alicia, Meliza Ferris PA-C        [Auto Hold] sodium chloride (PF) 0.9% PF flush 3 mL  3 mL Intracatheter Q8H TERI Alicia, Meliza Ferris PA-C        [Auto Hold] sodium chloride (PF) 0.9% PF flush 3 mL  3 mL Intracatheter q1 min prn Alicia, Meliza Ferris PA-C        sodium chloride 0.9 % infusion   Intravenous Continuous Alicia, Meliza Ferris PA-C        [Auto Hold] sodium chloride 0.9% BOLUS 1,000 mL  1,000 mL Intravenous Once Alicia, Meliza Ferris PA-C        [Auto Hold] traZODone (DESYREL) tablet 100 mg  100 mg Oral At Bedtime Alicia, Meliza Ray, PA-C                 Review of Systems:   A comprehensive 10-point review of systems was performed and found to be negative except as described in the HPI.     BP (!) 157/109   Pulse 62   Temp 97  F (36.1  C) (Temporal)   Resp 20   Wt 70.6 kg (155 lb 10.3 oz)   LMP 12/19/2013   SpO2 98%   BMI 26.93 kg/m    PSYCH: NAD  EYES: EOMI  MOUTH:  MMM  NEURO: AAO x3            Data:     Lab Results   Component Value Date    WBC 7.6 07/18/2025    HGB 13.4 07/18/2025    HCT 42.5 07/18/2025    MCV 90 07/18/2025     07/18/2025     Lab Results   Component Value Date    CR 0.95 07/18/2025

## 2025-07-19 NOTE — DISCHARGE SUMMARY
[facility]  Discharge Summary  Hospitalist      Date of Admission:  7/18/2025  Date of Discharge:  7/19/2025  Provider:  Norman Negron MD. [facility]  Date of Service (when I last saw the patient): 07/19/25      Primary Provider: Maryana Brooks          Discharge Diagnosis:     Discharge Diagnoses   Left renal pelvis obstructing stone with infection, status post cystoscopy and ureteral stent placement  Urinary tract infection  Post-pancreatectomy diabetes mellitus  Acute kidney injury, likely obstructive  Chronic pain syndrome  Depression  Hypothyroidism    Other medical issues:  Past Medical History:   Diagnosis Date    Benign paroxysmal positional vertigo     occ.     Calculus of kidney 05/2005    x1 on L side passed, several stones.  Has been tested for oxalate.    Chronic abdominal pain 07/17/2013    Chronic pancreatitis 07/17/2013    Depression     also occ panic spells    Diabetes (H) 5/10/2013    Total Pancreatomy with Auto Islets Transplant    Dyspepsia 06/1999    H. pylori   treated    Dysthymia 03/01/2016    Headaches     still periodic HA's ;  often 5X/week    Hypertension 02/22/2016    Stress related    Iron deficiency anemia 11/2003    relates to gastric bypass    Post-pancreatectomy diabetes melltius 05/17/2013    Sleep apnea     uses splint    Spasm of sphincter of Oddi     surgical + endoscopic stenting of pancreatic duct @ [facility] [place]06    Thyroid nodule 11/01/2016         Please see the admission history and physical for full details.     Hospital Course     Lynn Thompson was admitted on 7/18/2025.  The following problems were addressed during her hospitalization:    Brief History of Presentation: The patient, with a medical history significant for asplenia, post-pancreatectomy diabetes managed with an insulin pump, prior gastric bypass, and recurrent nephrolithiasis, was admitted with a 24-48 hour history of left flank pain, low-grade fevers, nausea, and vomiting.  Initial evaluation revealed a grossly positive urinalysis and a CT scan showing a 16 mm obstructing stone in the left renal pelvis with perinephric stranding, suggestive of an infected stone.  Hospital Course: The patient underwent cystoscopy and left ureteral stent placement by urology. She was started on IV Rocephin, and her insulin pump was held due to NPO status, with management switched to a sliding scale insulin regimen. Her acute kidney injury, likely secondary to obstruction, showed improvement with IV fluids. Home medications for chronic pain, depression, and hypothyroidism were resumed. On 07/19, the patient was stable, with controlled pain and preliminary urine culture results indicating lactose fermenting gram-negative bacilli. She was discharged with a prescription for ciprofloxacin for 5 days and instructions to follow up on urine culture results and with urology for stent removal.  Consultations and Recommendations:  Urology (Dr. Del Rio): Post-cystoscopy and stent placement care, continue IV Rocephin, and follow up for stent removal.  Discharge Plan:  Continue ciprofloxacin for 5 days.  Follow up with primary care on urine culture results.  Check MyChart for sensitivity results.  Follow up with Dr. Del Rio in the urology clinic for stent removal.    Pending Results   Unresulted Labs Ordered in the Past 30 Days of this Admission       Date and Time Order Name Status Description    7/18/2025  4:46 PM Urine Culture Preliminary             Discharge Orders      Reason for your hospital stay    Kidney stone with urine infection     Activity    Your activity upon discharge: activity as tolerated     Follow Up    Follow up with urology clinic Dr. Del Rio, call for appointment     Diet    Follow this diet upon discharge: Current Diet:Orders Placed This Encounter      Diabetic diet Adult     Hospital Follow-up with Existing Primary Care Provider (PCP)            Code Status   Full Code       Primary Care  Physician   Maryana Brooks    Physical Exam   Temp: 97.8  F (36.6  C) Temp src: Oral BP: 123/64 Pulse: 60   Resp: 18 SpO2: 97 % O2 Device: None (Room air) Oxygen Delivery: 5 LPM  Vitals:    07/18/25 1548   Weight: 70.6 kg (155 lb 10.3 oz)     Vital Signs with Ranges  Temp:  [96.6  F (35.9  C)-98.4  F (36.9  C)] 97.8  F (36.6  C)  Pulse:  [56-91] 60  Resp:  [11-20] 18  BP: ()/() 123/64  SpO2:  [96 %-100 %] 97 %  I/O last 3 completed shifts:  In: 1400 [P.O.:800; I.V.:600]  Out: 2000 [Urine:2000]    Constitutional:  alert, cooperative, no apparent distress  Respiratory: No increased work of breathing, good air exchange, no crackles or wheezing.  Cardiovascular: apical impulse,normal S1 and S2  GI: bowel sounds present, soft, non-distended, non-tender      Discharge Disposition   Discharged to home    Consultations This Hospital Stay   UROLOGY IP CONSULT    Time Spent on this Encounter   I, Norman Negron MD, personally saw the patient today and spent greater than 30 minutes discharging this patient.      Discharge Medications   Current Discharge Medication List        START taking these medications    Details   ciprofloxacin (CIPRO) 500 MG tablet Take 1 tablet (500 mg) by mouth 2 times daily for 5 days.  Qty: 10 tablet, Refills: 0    Associated Diagnoses: Kidney stone; Complicated UTI (urinary tract infection)      fluconazole (DIFLUCAN) 100 MG tablet Take 1 tablet (100 mg) by mouth daily for 3 days.  Qty: 3 tablet, Refills: 0    Comments: Takes as needed for candida  Associated Diagnoses: Kidney stone; Complicated UTI (urinary tract infection)      HYDROmorphone (DILAUDID) 2 MG tablet Take 1 tablet (2 mg) by mouth every 6 hours as needed for pain.  Qty: 10 tablet, Refills: 0    Associated Diagnoses: Kidney stone; Complicated UTI (urinary tract infection)           CONTINUE these medications which have NOT CHANGED    Details   acetaminophen (TYLENOL) 500 MG tablet Take 2 tablets (1,000 mg) by  mouth every 8 hours as needed for mild pain.  Qty: 60 tablet, Refills: 0    Associated Diagnoses: Open wound of left index finger due to dog bite; Infectious tenosynovitis      blood glucose (CONTOUR NEXT TEST) test strip Use to test blood sugar 4-6 times daily or as directed.  Qty: 600 strip, Refills: 4    Associated Diagnoses: Post-pancreatectomy diabetes (H); Acquired absence of pancreas      calcium carbonate 600 mg-vitamin D 400 units (CALTRATE) 600-400 MG-UNIT per tablet Take 1 tablet by mouth 2 times daily.      DULoxetine (CYMBALTA) 20 MG capsule Take 20 mg by mouth daily      escitalopram (LEXAPRO) 20 MG tablet Take 20 mg by mouth daily      famotidine (PEPCID) 40 MG tablet TAKE ONE TABLET BY MOUTH TWICE DAILY AS NEEDED for heartburn  Qty: 180 tablet, Refills: 3    Associated Diagnoses: Gastroesophageal reflux disease without esophagitis      !! FIASP 100 UNIT/ML SOLN INJECT 15 UNITS UNDER THE SKIN VIA INSULIN PUMP. MAY USE UP TO 85 UNITS DAILY  Qty: 90 mL, Refills: 0    Associated Diagnoses: Post-pancreatectomy diabetes (H)      gabapentin (NEURONTIN) 300 MG capsule 1 capsule BID.  If still having pain can increase to 1 capsule TID.  Qty: 90 capsule, Refills: 0    Associated Diagnoses: Generalized abdominal pain      Glucagon (GVOKE HYPOPEN 1-PACK) 1 MG/0.2ML pen Inject 0.2 mLs (1 mg) subcutaneously once as needed (for hypoglycemia with loss of consciousness).  Qty: 15 mL, Refills: 5    Associated Diagnoses: Post-pancreatectomy diabetes (H)      hydrOXYzine (ATARAX) 25 MG tablet Take 25 mg by mouth daily as needed.  Refills: 0      !! Insulin Aspart, w/Niacinamide, (FIASP) 100 UNIT/ML SOLN Inject 85 Units as directed daily. Use to fill insulin pump every 2-3 days, up to 85 units per day, 3 mos supply.  Qty: 90 mL, Refills: 3    Associated Diagnoses: Post-pancreatectomy diabetes (H)      insulin glargine (LANTUS PEN) 100 UNIT/ML pen Inject 6 units daily in the event of pump failure  Qty: 15 mL, Refills: 0     Comments: HAVE ON FILE ONLY, in case of pump failure.  Patient will not  at this time. If Lantus is not covered by insurance, may substitute Basaglar or Semglee or other insulin glargine product per insurance.  Associated Diagnoses: Post-pancreatectomy diabetes (H)      INSULIN PUMP - OUTPATIENT Inject subcutaneously continuously. Date Last Updated on chart: 2024  Omnipod 5, type of insulin: Fiasp  Change site every 3 days  Basal Rates in units/hr  4933-6381: 0.15 unit/hr  9475-1130: 0.35 units/hr  ICF (insulin to carb ratio): 8 (1 unit covers 8g carbs)  ISF (insulin sensitivity factor): 50 (1 unit lowers BG by 50mg/dL)  Target B  Active insulin time: 4 hrs      levothyroxine (SYNTHROID/LEVOTHROID) 125 MCG tablet Take 1 tablet (125 mcg) by mouth daily  Qty: 90 tablet, Refills: 0    Associated Diagnoses: Acquired hypothyroidism      loratadine (CLARITIN) 10 MG tablet Take 10 mg by mouth daily.      Melatonin 10 MG TABS tablet Take 10 mg by mouth nightly as needed for sleep.      omeprazole (PRILOSEC) 40 MG DR capsule Take 1 capsule (40 mg) by mouth 2 times daily. Take 30-60 minutes before a meal.  Qty: 180 capsule, Refills: 3    Associated Diagnoses: Gastroesophageal reflux disease without esophagitis      ondansetron (ZOFRAN ODT) 4 MG ODT tab Take 1 tablet (4 mg) by mouth every 6 hours as needed for nausea or vomiting.  Qty: 30 tablet, Refills: 3    Associated Diagnoses: Gastroesophageal reflux disease without esophagitis      VIOKACE 83976-65748 units TABS per tablet TAKE 5 TABLETS BY MOUTH WITH MEALS, AND TAKE 3 TABLETS WITH SNACKS. DAILY MAX = 20 TABLETS  Qty: 500 tablet, Refills: 11    Associated Diagnoses: Pancreatic insufficiency      blood glucose (NO BRAND SPECIFIED) lancets standard Use to test blood sugar 4-6 times daily or as directed.  Qty: 360 Lancet, Refills: 4    Associated Diagnoses: Post-pancreatectomy diabetes (H); Acquired absence of pancreas      Continuous Glucose Sensor  (DEXCOM G6 SENSOR) MISC CHANGE EVERY 10 DAYS  Qty: 3 each, Refills: 5    Associated Diagnoses: Post-pancreatectomy diabetes (H)      Continuous Glucose Transmitter (DEXCOM G6 TRANSMITTER) MISC CHANGE EVERY 90 DAYS  Qty: 1 each, Refills: 1    Associated Diagnoses: Post-pancreatectomy diabetes (H)      Insulin Disposable Pump (OMNIPOD 5 PODS, GEN 5,) MISC CHANGE EVERY THREE DAYS  Qty: 30 each, Refills: 5    Associated Diagnoses: Post-pancreatectomy diabetes (H)      polyethylene glycol (MIRALAX) 17 GM/Dose powder Take 17 g by mouth daily.  Qty: 510 g, Refills: 0    Associated Diagnoses: Open wound of left index finger due to dog bite; Infectious tenosynovitis       !! - Potential duplicate medications found. Please discuss with provider.        Allergies   Allergies   Allergen Reactions    Corticosteroids Other (See Comments)     All oral, IV and injectable steroids are contraindicated (unless in life threatening situations) in Islet Auto transplant recipients. They can cause irreversible loss of islet cell function. Please contact patient's transplant care coordinator, Erlinda Multani RN BSN at [phone]/pager: [phone] and endocrinologist prior to administration.      Povidone Iodine Hives     Causes skin to blister    Nsaids      naprosyn = GI upset    Sulfasalazine Nausea and Nausea and Vomiting     Data   Most Recent 3 CBC's:  Recent Labs   Lab Test 07/19/25  0925 07/18/25  1707 04/28/25  0808   WBC 9.3 7.6 6.3   HGB 13.1 13.4 12.2   MCV 91 90 90    360 403      Most Recent 3 BMP's:  Recent Labs   Lab Test 07/19/25  1349 07/19/25  1221 07/19/25  0925 07/18/25  2211 07/18/25  1707 11/21/24  1202 11/21/24  1001   NA  --   --  144  --  138  --  144   POTASSIUM  --   --  4.4  --  4.5  --  3.8   CHLORIDE  --   --  108*  --  102  --  107   CO2  --   --  27  --  27  --  27   BUN  --   --  17.5  --  26.3*  --  15.2   CR  --   --  0.91  --  0.95  --  1.07*   ANIONGAP  --   --  9  --  9  --  10   VALARIE  --    --  8.9  --  9.5  --  9.6   * 67* 98   < > 101*   < > 137*    < > = values in this interval not displayed.     Most Recent 2 LFT's:  Recent Labs   Lab Test 07/18/25  1707 11/21/24  1001   AST 35 43   ALT 23 42   ALKPHOS 105 110   BILITOTAL 0.2 0.5     Most Recent INR's and Anticoagulation Dosing History:  Anticoagulation Dose History          Latest Ref Rng & Units 1/8/2011 5/8/2013 5/10/2013 5/10/2016 3/3/2024   Recent Dosing and Labs   INR 0.85 - 1.15 1.05  1.01  1.29  1.01  1.03  0.97       Details          Multiple values from one day are sorted in reverse-chronological order             Most Recent 3 Troponin's:  Recent Labs   Lab Test 02/28/21  0031 03/08/19  1236   TROPI <0.015 <0.015     Most Recent Cholesterol Panel:  Recent Labs   Lab Test 11/21/24  1001   CHOL 205*   *   HDL 69   TRIG 175*     Most Recent 6 Bacteria Isolates From Any Culture (See EPIC Reports for Culture Details):  Recent Labs   Lab Test 03/01/21  0228 03/01/21  0207 02/28/21  0517 02/28/21  0257 02/28/21  0256 10/30/20  0940   CULT No growth No growth No growth No growth No growth No anaerobes isolated  No growth     Most Recent TSH, T4 and A1c Labs:  Recent Labs   Lab Test 07/18/25  1707 03/20/25  1442 11/21/24  1301   TSH  --   --  0.25*   T4  --   --  1.65   A1C 6.9*   < >  --     < > = values in this interval not displayed.     Results for orders placed or performed during the hospital encounter of 07/18/25   CT Abdomen Pelvis w/o Contrast    Narrative    EXAM: CT ABDOMEN PELVIS W/O CONTRAST  LOCATION: [facility]  DATE: 7/18/2025    INDICATION: left flank pain  COMPARISON: CT abdomen pelvis 8/22/2024  TECHNIQUE: CT scan of the abdomen and pelvis was performed without IV contrast. Multiplanar reformats were obtained. Dose reduction techniques were used.  CONTRAST: None.    FINDINGS:   LOWER CHEST: Normal.    HEPATOBILIARY: Normal unenhanced liver contours. Cholecystectomy. Pneumobilia from prior  biliary intervention/postsurgical anatomy.    PANCREAS: Pancreatectomy.    SPLEEN: Splenectomy.    ADRENAL GLANDS: Normal.    KIDNEYS/BLADDER: 16 mm stone in the left renal pelvis with associated urothelial thickening and periureteral stranding. Similar mild pericystic stranding.    BOWEL: Postsurgical changes of the stomach and proximal colon. No bowel obstruction. Moderate amount of formed stool throughout the colon. No free fluid or free air.    LYMPH NODES: No lymphadenopathy.    VASCULATURE: The abdominal aorta is nonaneurysmal.    PELVIC ORGANS: Hysterectomy.    MUSCULOSKELETAL: No suspicious bone lesions.      Impression    IMPRESSION:   1.  16 mm stone in the left renal pelvis with associated urothelial thickening and periureteral stranding, suspicious for superinfection.  2.  Additional details in the findings.     XR Surgery HEIDI L/T 5 Min Fluoro w Stills    Narrative    This exam was marked as non-reportable because it will not be read by a   radiologist or a [place] non-radiologist provider.           *Note: Due to a large number of results and/or encounters for the requested time period, some results have not been displayed. A complete set of results can be found in Results Review.           Disclaimer: This note consists of symbols derived from keyboarding, dictation and/or voice recognition software. As a result, there may be errors in the script that have gone undetected. Please consider this when interpreting information found in this chart.

## 2025-07-20 LAB
BACTERIA UR CULT: ABNORMAL
BACTERIA UR CULT: ABNORMAL

## 2025-07-21 DIAGNOSIS — Z09 HOSPITAL DISCHARGE FOLLOW-UP: ICD-10-CM

## 2025-07-23 ENCOUNTER — TELEPHONE (OUTPATIENT)
Dept: UROLOGY | Facility: CLINIC | Age: 60
End: 2025-07-23
Payer: COMMERCIAL

## 2025-07-23 DIAGNOSIS — N20.0 KIDNEY STONE: Primary | ICD-10-CM

## 2025-07-23 RX ORDER — TAMSULOSIN HYDROCHLORIDE 0.4 MG/1
0.4 CAPSULE ORAL DAILY
Qty: 30 CAPSULE | Refills: 0 | Status: SHIPPED | OUTPATIENT
Start: 2025-07-23

## 2025-07-23 RX ORDER — OXYBUTYNIN CHLORIDE 5 MG/1
5 TABLET ORAL 3 TIMES DAILY
Qty: 90 TABLET | Refills: 0 | Status: SHIPPED | OUTPATIENT
Start: 2025-07-23

## 2025-07-23 NOTE — TELEPHONE ENCOUNTER
Spoke with patient, informed her that we sent Flomax and Oxybutynin to her preferred pharmacy.    Vesta Romeo, RN, BSN  Urology Care Coordinator  [facility]  [phone]

## 2025-07-23 NOTE — TELEPHONE ENCOUNTER
Patient calling into nurse triage, states she's having moderate left flank pain with gross hematuria.  Patient had left ureteral stent placed on 7/18/25 with Dr. Del Rio.  Rotating Tylenol 1000 mg every 4 hours and Ibuprofen 400 mg every 4 hours, doesn't help much with the pain.  Has been using heating pad which does help.  Message routed to Dr. Del Rio about prescribing Flomax and Oxybutynin.  Patient states understanding.    Vesta Romeo, RN, BSN  Urology Care Coordinator  [facility]  [phone]

## 2025-07-24 DIAGNOSIS — E13.9 POST-PANCREATECTOMY DIABETES (H): ICD-10-CM

## 2025-07-24 DIAGNOSIS — Z90.410 POST-PANCREATECTOMY DIABETES (H): ICD-10-CM

## 2025-07-24 DIAGNOSIS — E89.1 POST-PANCREATECTOMY DIABETES (H): ICD-10-CM

## 2025-07-24 RX ORDER — PROCHLORPERAZINE 25 MG/1
SUPPOSITORY RECTAL
Qty: 1 EACH | Refills: 1 | Status: SHIPPED | OUTPATIENT
Start: 2025-07-24

## 2025-07-30 ENCOUNTER — ANESTHESIA EVENT (OUTPATIENT)
Dept: SURGERY | Facility: CLINIC | Age: 60
End: 2025-07-30
Payer: COMMERCIAL

## 2025-07-30 ENCOUNTER — HOSPITAL ENCOUNTER (OUTPATIENT)
Facility: CLINIC | Age: 60
Discharge: HOME | End: 2025-07-30
Attending: UROLOGY | Admitting: UROLOGY
Payer: COMMERCIAL

## 2025-07-30 ENCOUNTER — ANESTHESIA (OUTPATIENT)
Dept: SURGERY | Facility: CLINIC | Age: 60
End: 2025-07-30
Payer: COMMERCIAL

## 2025-07-30 VITALS
SYSTOLIC BLOOD PRESSURE: 134 MMHG | WEIGHT: 156.6 LBS | BODY MASS INDEX: 27.09 KG/M2 | RESPIRATION RATE: 16 BRPM | TEMPERATURE: 97.4 F | OXYGEN SATURATION: 94 % | HEART RATE: 79 BPM | DIASTOLIC BLOOD PRESSURE: 72 MMHG

## 2025-07-30 LAB
GLUCOSE BLDC GLUCOMTR-MCNC: 75 MG/DL (ref 70–99)
GLUCOSE BLDC GLUCOMTR-MCNC: 97 MG/DL (ref 70–99)

## 2025-07-30 PROCEDURE — 250N000009 HC RX 250: Performed by: UROLOGY

## 2025-07-30 PROCEDURE — 250N000011 HC RX IP 250 OP 636

## 2025-07-30 PROCEDURE — 250N000009 HC RX 250

## 2025-07-30 PROCEDURE — 250N000013 HC RX MED GY IP 250 OP 250 PS 637: Performed by: UROLOGY

## 2025-07-30 PROCEDURE — 370N000017 HC ANESTHESIA TECHNICAL FEE, PER MIN: Performed by: UROLOGY

## 2025-07-30 PROCEDURE — 82365 CALCULUS SPECTROSCOPY: CPT | Performed by: UROLOGY

## 2025-07-30 PROCEDURE — 250N000009 HC RX 250: Performed by: NURSE ANESTHETIST, CERTIFIED REGISTERED

## 2025-07-30 PROCEDURE — 82962 GLUCOSE BLOOD TEST: CPT

## 2025-07-30 PROCEDURE — 360N000084 HC SURGERY LEVEL 4 W/ FLUORO, PER MIN: Performed by: UROLOGY

## 2025-07-30 PROCEDURE — 258N000003 HC RX IP 258 OP 636: Performed by: ANESTHESIOLOGY

## 2025-07-30 PROCEDURE — 52356 CYSTO/URETERO W/LITHOTRIPSY: CPT | Mod: LT | Performed by: UROLOGY

## 2025-07-30 PROCEDURE — C1747 HC OR ENDOSCOPE, SGL USE,URINARY TRACT, IMAGING/ILLUMINATION DEVICE: HCPCS | Performed by: UROLOGY

## 2025-07-30 PROCEDURE — C1894 INTRO/SHEATH, NON-LASER: HCPCS | Performed by: UROLOGY

## 2025-07-30 PROCEDURE — C1758 CATHETER, URETERAL: HCPCS | Performed by: UROLOGY

## 2025-07-30 PROCEDURE — 74420 UROGRAPHY RTRGR +-KUB: CPT | Mod: 26 | Performed by: UROLOGY

## 2025-07-30 PROCEDURE — C1769 GUIDE WIRE: HCPCS | Performed by: UROLOGY

## 2025-07-30 PROCEDURE — 258N000001 HC RX 258: Performed by: UROLOGY

## 2025-07-30 PROCEDURE — C2617 STENT, NON-COR, TEM W/O DEL: HCPCS | Performed by: UROLOGY

## 2025-07-30 PROCEDURE — 255N000002 HC RX 255 OP 636: Performed by: UROLOGY

## 2025-07-30 PROCEDURE — 250N000011 HC RX IP 250 OP 636: Performed by: UROLOGY

## 2025-07-30 PROCEDURE — 710N000012 HC RECOVERY PHASE 2, PER MINUTE: Performed by: UROLOGY

## 2025-07-30 PROCEDURE — 250N000013 HC RX MED GY IP 250 OP 250 PS 637: Performed by: ANESTHESIOLOGY

## 2025-07-30 PROCEDURE — 250N000011 HC RX IP 250 OP 636: Performed by: ANESTHESIOLOGY

## 2025-07-30 PROCEDURE — 710N000009 HC RECOVERY PHASE 1, LEVEL 1, PER MIN: Performed by: UROLOGY

## 2025-07-30 PROCEDURE — 250N000025 HC SEVOFLURANE, PER MIN: Performed by: UROLOGY

## 2025-07-30 PROCEDURE — 999N000141 HC STATISTIC PRE-PROCEDURE NURSING ASSESSMENT: Performed by: UROLOGY

## 2025-07-30 PROCEDURE — 272N000001 HC OR GENERAL SUPPLY STERILE: Performed by: UROLOGY

## 2025-07-30 DEVICE — URETERAL STENT
Type: IMPLANTABLE DEVICE | Site: URETER | Status: FUNCTIONAL
Brand: POLARIS™ ULTRA

## 2025-07-30 RX ORDER — HYDROMORPHONE HCL IN WATER/PF 6 MG/30 ML
0.4 PATIENT CONTROLLED ANALGESIA SYRINGE INTRAVENOUS EVERY 5 MIN PRN
Status: DISCONTINUED | OUTPATIENT
Start: 2025-07-30 | End: 2025-07-30 | Stop reason: HOSPADM

## 2025-07-30 RX ORDER — ATROPA BELLADONNA AND OPIUM 16.2; 3 MG/1; MG/1
SUPPOSITORY RECTAL PRN
Status: DISCONTINUED | OUTPATIENT
Start: 2025-07-30 | End: 2025-07-30

## 2025-07-30 RX ORDER — ONDANSETRON 2 MG/ML
4 INJECTION INTRAMUSCULAR; INTRAVENOUS EVERY 30 MIN PRN
Status: DISCONTINUED | OUTPATIENT
Start: 2025-07-30 | End: 2025-07-30 | Stop reason: HOSPADM

## 2025-07-30 RX ORDER — DEXAMETHASONE SODIUM PHOSPHATE 4 MG/ML
4 INJECTION, SOLUTION INTRA-ARTICULAR; INTRALESIONAL; INTRAMUSCULAR; INTRAVENOUS; SOFT TISSUE
Status: DISCONTINUED | OUTPATIENT
Start: 2025-07-30 | End: 2025-07-30 | Stop reason: HOSPADM

## 2025-07-30 RX ORDER — FENTANYL CITRATE 50 UG/ML
INJECTION, SOLUTION INTRAMUSCULAR; INTRAVENOUS PRN
Status: DISCONTINUED | OUTPATIENT
Start: 2025-07-30 | End: 2025-07-30

## 2025-07-30 RX ORDER — LIDOCAINE 40 MG/G
CREAM TOPICAL
Status: DISCONTINUED | OUTPATIENT
Start: 2025-07-30 | End: 2025-07-30 | Stop reason: HOSPADM

## 2025-07-30 RX ORDER — HYDROMORPHONE HCL IN WATER/PF 6 MG/30 ML
0.2 PATIENT CONTROLLED ANALGESIA SYRINGE INTRAVENOUS EVERY 5 MIN PRN
Status: DISCONTINUED | OUTPATIENT
Start: 2025-07-30 | End: 2025-07-30 | Stop reason: HOSPADM

## 2025-07-30 RX ORDER — ONDANSETRON 4 MG/1
4 TABLET, ORALLY DISINTEGRATING ORAL EVERY 30 MIN PRN
Status: DISCONTINUED | OUTPATIENT
Start: 2025-07-30 | End: 2025-07-30 | Stop reason: HOSPADM

## 2025-07-30 RX ORDER — ACETAMINOPHEN 325 MG/1
975 TABLET ORAL ONCE
Status: COMPLETED | OUTPATIENT
Start: 2025-07-30 | End: 2025-07-30

## 2025-07-30 RX ORDER — FENTANYL CITRATE 50 UG/ML
50 INJECTION, SOLUTION INTRAMUSCULAR; INTRAVENOUS EVERY 5 MIN PRN
Status: DISCONTINUED | OUTPATIENT
Start: 2025-07-30 | End: 2025-07-30 | Stop reason: HOSPADM

## 2025-07-30 RX ORDER — ACETAMINOPHEN 650 MG/1
650 SUPPOSITORY RECTAL ONCE
Status: COMPLETED | OUTPATIENT
Start: 2025-07-30 | End: 2025-07-30

## 2025-07-30 RX ORDER — ONDANSETRON 2 MG/ML
INJECTION INTRAMUSCULAR; INTRAVENOUS PRN
Status: DISCONTINUED | OUTPATIENT
Start: 2025-07-30 | End: 2025-07-30

## 2025-07-30 RX ORDER — FENTANYL CITRATE 50 UG/ML
25 INJECTION, SOLUTION INTRAMUSCULAR; INTRAVENOUS EVERY 5 MIN PRN
Status: DISCONTINUED | OUTPATIENT
Start: 2025-07-30 | End: 2025-07-30 | Stop reason: HOSPADM

## 2025-07-30 RX ORDER — PROPOFOL 10 MG/ML
INJECTION, EMULSION INTRAVENOUS PRN
Status: DISCONTINUED | OUTPATIENT
Start: 2025-07-30 | End: 2025-07-30

## 2025-07-30 RX ORDER — CEFAZOLIN SODIUM/WATER 2 G/20 ML
2 SYRINGE (ML) INTRAVENOUS
Status: COMPLETED | OUTPATIENT
Start: 2025-07-30 | End: 2025-07-30

## 2025-07-30 RX ORDER — NALOXONE HYDROCHLORIDE 0.4 MG/ML
0.1 INJECTION, SOLUTION INTRAMUSCULAR; INTRAVENOUS; SUBCUTANEOUS
Status: DISCONTINUED | OUTPATIENT
Start: 2025-07-30 | End: 2025-07-30 | Stop reason: HOSPADM

## 2025-07-30 RX ORDER — SODIUM CHLORIDE, SODIUM LACTATE, POTASSIUM CHLORIDE, CALCIUM CHLORIDE 600; 310; 30; 20 MG/100ML; MG/100ML; MG/100ML; MG/100ML
INJECTION, SOLUTION INTRAVENOUS CONTINUOUS
Status: DISCONTINUED | OUTPATIENT
Start: 2025-07-30 | End: 2025-07-30 | Stop reason: HOSPADM

## 2025-07-30 RX ORDER — LIDOCAINE HYDROCHLORIDE 20 MG/ML
INJECTION, SOLUTION INFILTRATION; PERINEURAL PRN
Status: DISCONTINUED | OUTPATIENT
Start: 2025-07-30 | End: 2025-07-30

## 2025-07-30 RX ORDER — IBUPROFEN 600 MG/1
600 TABLET, FILM COATED ORAL EVERY 6 HOURS PRN
Status: DISCONTINUED | OUTPATIENT
Start: 2025-07-30 | End: 2025-07-30 | Stop reason: HOSPADM

## 2025-07-30 RX ORDER — CEFAZOLIN SODIUM/WATER 2 G/20 ML
2 SYRINGE (ML) INTRAVENOUS SEE ADMIN INSTRUCTIONS
Status: DISCONTINUED | OUTPATIENT
Start: 2025-07-30 | End: 2025-07-30 | Stop reason: HOSPADM

## 2025-07-30 RX ADMIN — FENTANYL CITRATE 50 MCG: 50 INJECTION, SOLUTION INTRAMUSCULAR; INTRAVENOUS at 14:31

## 2025-07-30 RX ADMIN — MIDAZOLAM 2 MG: 1 INJECTION INTRAMUSCULAR; INTRAVENOUS at 13:01

## 2025-07-30 RX ADMIN — ACETAMINOPHEN 975 MG: 325 TABLET ORAL at 11:17

## 2025-07-30 RX ADMIN — IBUPROFEN 600 MG: 600 TABLET, FILM COATED ORAL at 15:48

## 2025-07-30 RX ADMIN — LIDOCAINE HYDROCHLORIDE 40 MG: 20 INJECTION, SOLUTION INFILTRATION; PERINEURAL at 13:13

## 2025-07-30 RX ADMIN — Medication 2 G: at 13:09

## 2025-07-30 RX ADMIN — FENTANYL CITRATE 100 MCG: 50 INJECTION INTRAMUSCULAR; INTRAVENOUS at 13:04

## 2025-07-30 RX ADMIN — SODIUM CHLORIDE, SODIUM LACTATE, POTASSIUM CHLORIDE, AND CALCIUM CHLORIDE: .6; .31; .03; .02 INJECTION, SOLUTION INTRAVENOUS at 12:59

## 2025-07-30 RX ADMIN — ONDANSETRON 4 MG: 2 INJECTION INTRAMUSCULAR; INTRAVENOUS at 13:18

## 2025-07-30 RX ADMIN — ATROPA BELLADONNA AND OPIUM 1 SUPPOSITORY: 16.2; 3 SUPPOSITORY RECTAL at 14:13

## 2025-07-30 RX ADMIN — PROPOFOL 170 MG: 10 INJECTION, EMULSION INTRAVENOUS at 13:13

## 2025-07-30 RX ADMIN — FENTANYL CITRATE 50 MCG: 50 INJECTION, SOLUTION INTRAMUSCULAR; INTRAVENOUS at 14:50

## 2025-07-30 RX ADMIN — SODIUM CHLORIDE, SODIUM LACTATE, POTASSIUM CHLORIDE, AND CALCIUM CHLORIDE: .6; .31; .03; .02 INJECTION, SOLUTION INTRAVENOUS at 11:22

## 2025-07-30 RX ADMIN — SODIUM CHLORIDE, SODIUM LACTATE, POTASSIUM CHLORIDE, AND CALCIUM CHLORIDE: .6; .31; .03; .02 INJECTION, SOLUTION INTRAVENOUS at 14:15

## 2025-07-30 ASSESSMENT — ACTIVITIES OF DAILY LIVING (ADL)
ADLS_ACUITY_SCORE: 46

## 2025-07-30 NOTE — ANESTHESIA PREPROCEDURE EVALUATION
Anesthesia Pre-Procedure Evaluation    Patient: Lynn Thompson   MRN: [id number] : 1965          Procedure : Procedure(s):  Cystoscopy, left ureteral stent exchange, left ureteroscopy with laser lithotripsy and stone basketing, use of the steerable ureteroscopic renal evacuation device         Past Medical History:   Diagnosis Date    Benign paroxysmal positional vertigo     occ.     Calculus of kidney 05/2005    x1 on L side passed, several stones.  Has been tested for oxalate.    Chronic abdominal pain 2013    Chronic pancreatitis 2013    Depression     also occ panic spells    Diabetes (H) 5/10/2013    Total Pancreatomy with Auto Islets Transplant    Dyspepsia 1999    H. pylori   treated    Dysthymia 2016    Headaches     still periodic HA's ;  often 5X/week    Hypertension 2016    Stress related    Iron deficiency anemia 2003    relates to gastric bypass    Post-pancreatectomy diabetes melltius 2013    Sleep apnea     uses splint    Spasm of sphincter of Oddi     surgical + endoscopic stenting of pancreatic duct @ [facility] 06    Thyroid nodule 2016      Past Surgical History:   Procedure Laterality Date    ABDOMINOPLASTY  2002    Tummy tuck    APPENDECTOMY  1990    BUNIONECTOMY Right 1998    CBD Stent placement  2002    CBD stent; Dr. Presley     SECTION      CHOLECYSTECTOMY      COLONOSCOPY N/A 2021    Procedure: COLONOSCOPY INCOMPLETE Aborted due to incomplete prep  will need to take additional prep and return tomorrow 21;  Surgeon: Ihsan Saenz MD;  Location: [facility]    COMBINED CYSTOSCOPY, INSERT STENT URETER(S) Left 2025    Procedure: CYSTOSCOPY, WITH URETERAL STENT INSERTION;  Surgeon: Tmair Del Rio MD;  Location: [facility]    COMBINED CYSTOSCOPY, RETROGRADES, URETEROSCOPY, LASER HOLMIUM LITHOTRIPSY URETER(S), INSERT STENT Right 2015    Procedure: COMBINED CYSTOSCOPY, RETROGRADES, URETEROSCOPY, LASER  HOLMIUM LITHOTRIPSY URETER(S), INSERT STENT;  Surgeon: Kennedi Aldana MD;  [facility]    COMBINED CYSTOSCOPY, RETROGRADES, URETEROSCOPY, LASER HOLMIUM LITHOTRIPSY URETER(S), INSERT STENT Right 04/20/2015    Procedure: COMBINED CYSTOSCOPY, RETROGRADES, URETEROSCOPY, LASER HOLMIUM LITHOTRIPSY URETER(S), INSERT STENT;  Surgeon: Kennedi Aldana MD;  [facility]: [facility]    COSMETIC SURGERY  6/24/2002    Tummy tuck    CYSTECTOMY OVARIAN BENIGN Right     CYSTOSCOPY, RETROGRADES, INSERT STENT URETER(S), COMBINED  10/02/2012    Procedure: COMBINED CYSTOSCOPY, RETROGRADES, INSERT STENT URETER(S);  COMBINED CYSTOSCOPY,  , INSERT LEFT STENT URETER;  Surgeon: Johny Baez MD;  Location: [facility]    CYSTOSCOPY, RETROGRADES, INSERT STENT URETER(S), COMBINED Left 9/20/2022    Procedure: Cystoscopy with left retrograde pyelogram, left ureteroscopy, thulium laser lithotripsy of left ureteral calculus, stone basketing and left ureteral stent insertion;  Surgeon: Alonzo Rome MD;  Location: [facility]    ESOPHAGOSCOPY, GASTROSCOPY, DUODENOSCOPY (EGD), COMBINED N/A 01/24/2018    Procedure: COMBINED ESOPHAGOSCOPY, GASTROSCOPY, DUODENOSCOPY (EGD);  ESOPHAGOSCOPY, GASTROSCOPY, DUODENOSCOPY (EGD)    ;  Surgeon: Tamir Rodgers MD;  Location: [facility]    EXTRACORPOREAL SHOCK WAVE LITHOTRIPSY (ESWL)  10/16/2012    Procedure: EXTRACORPOREAL SHOCK WAVE LITHOTRIPSY (ESWL);  left EXTRACORPOREAL SHOCK WAVE LITHOTRIPSY (ESWL) ;  Surgeon: Johny Baez MD;  Location: [facility]    Gastric bypass NOS      HERNIA REPAIR  02/2015    HYSTERECTOMY SUPRACERVICAL, BILATERAL SALPINGO-OOPHORECTOMY, COMBINED N/A 02/01/2022    Procedure: Abdominal supracervical hysterectomy, bilateral salpingooophrectomy;  Surgeon: Nicole Rivera MD;  [facility]    INCISION AND DRAINAGE FINGER, COMBINED Left 11/2/2024    Procedure: Left index finger incision and debridement to the level of bone, dorsally, 2cm2. Left index finger proximal  interphalangial joint irrigation. Left index finger incision and debridement to the level of the flexor tendon sheath, 3 cm2.;  Surgeon: Aileen Reza MD;  Location: [facility]    IRRIGATION AND DEBRIDEMENT HAND, COMBINED Left 10/30/2020    Procedure: Left hand sharp excisional debridement of skin, subcutaneous tissue and fat with a scalpel, 2 x 1 x 1 cm.;  Surgeon: Demian Renteria MD;  [facility]: [facility]    LAPAROSCOPIC LYSIS ADHESIONS N/A 02/20/2015    Procedure: LAPAROSCOPIC LYSIS ADHESIONS;  Surgeon: Aaron Early MD;  [facility]    LAPAROSCOPIC LYSIS ADHESIONS N/A 12/29/2015    Procedure: LAPAROSCOPIC LYSIS ADHESIONS;  Surgeon: Aaron Early MD;  [facility]    PANCREATECTOMY, TRANSPLANT AUTO ISLET CELL, COMBINED  05/10/2013    Procedure: COMBINED PANCREATECTOMY, TRANSPLANT AUTO ISLET CELL;  Pancreatectomy, Auto Islet Cell Transplant   hernia repair, jejunostomy tube and liver biopsies with Anesthesia General with block;  Surgeon: Aaron Early MD;  [facility]    Partial ileum resection  1992    RECTOPEXY ABDOMINAL N/A 02/01/2022    Procedure: RECTOPEXY, ABDOMINAL;  Surgeon: Uriah Sheridan MD;  [facility]    RELEASE TRIGGER FINGER Left 11/2/2024    Procedure: Left index finger A1 pulley release.;  Surgeon: Aileen Reza MD;  [facility]: [facility]    REPAIR PTOSIS BROW BILATERAL Bilateral 06/09/2020    Procedure: BILATERAL BROW PTOSIS REPAIR;  Surgeon: Denise Alberts MD;  Location: [facility]    SACROCOLPOPEXY, CYSTOSCOPY, COMBINED N/A 02/01/2022    Procedure: Uterosacral ligament suspension, pina colposuspension with Cystoscopy;  Surgeon: Nicole Rivera MD;  [facility]    SIGMOIDOSCOPY FLEXIBLE N/A 02/01/2022    Procedure: SIGMOIDOSCOPY, FLEXIBLE;  Surgeon: Uriah Sheridan MD;  [facility]    Surgery for SBO  2015    TONSILLECTOMY, ADENOIDECTOMY, COMBINED  1997    TRANSPLANT  5/10/13    Pancreatic Auto-Islet Transplant      Allergies    Allergen Reactions    Corticosteroids Other (See Comments)     All oral, IV and injectable steroids are contraindicated (unless in life threatening situations) in Islet Auto transplant recipients. They can cause irreversible loss of islet cell function. Please contact patient's transplant care coordinator, Erlinda Multani RN BSN at [phone]/pager: [phone] and endocrinologist prior to administration.      Povidone Iodine Hives     Causes skin to blister    Nsaids      naprosyn = GI upset    Sulfasalazine Nausea and Nausea and Vomiting      Social History     Tobacco Use    Smoking status: Never    Smokeless tobacco: Never   Substance Use Topics    Alcohol use: Yes     Comment: once every 4 months      Wt Readings from Last 1 Encounters:   07/30/25 71 kg (156 lb 9.6 oz)        Anesthesia Evaluation   Pt has had prior anesthetic. Type: General.    No history of anesthetic complications       ROS/MED HX  ENT/Pulmonary:     (+) sleep apnea, doesn't use CPAP,                                      Neurologic:       Cardiovascular:     (+)  hypertension- -   -  - -                                      METS/Exercise Tolerance:     Hematologic:  - neg hematologic  ROS     Musculoskeletal:  - neg musculoskeletal ROS     GI/Hepatic:     (+) GERD,                   Renal/Genitourinary:     (+) renal disease,      Nephrolithiasis ,       Endo:     (+) type I DM,         thyroid problem,            Psychiatric/Substance Use:  - neg psychiatric ROS     Infectious Disease:  - neg infectious disease ROS     Malignancy:       Other:              Physical Exam  Airway  Mallampati: II  TM distance: >3 FB  Neck ROM: full    Cardiovascular - normal exam   Dental   (+) Modest Abnormalities - crowns, retainers, 1 or 2 missing teeth      Pulmonary - normal exam      Neurological - normal exam  She appears awake, alert and oriented x3.    Other Findings       OUTSIDE LABS:  CBC:   Lab Results   Component Value Date    WBC 9.3  07/19/2025    WBC 7.6 07/18/2025    HGB 13.1 07/19/2025    HGB 13.4 07/18/2025    HCT 41.3 07/19/2025    HCT 42.5 07/18/2025     07/19/2025     07/18/2025     BMP:   Lab Results   Component Value Date     07/19/2025     07/18/2025    POTASSIUM 4.4 07/19/2025    POTASSIUM 4.5 07/18/2025    CHLORIDE 108 (H) 07/19/2025    CHLORIDE 102 07/18/2025    CO2 27 07/19/2025    CO2 27 07/18/2025    BUN 17.5 07/19/2025    BUN 26.3 (H) 07/18/2025    CR 0.91 07/19/2025    CR 0.95 07/18/2025    GLC 97 07/30/2025    GLC 71 07/19/2025     COAGS:   Lab Results   Component Value Date    PTT 36 05/16/2013    INR 0.97 03/03/2024    FIBR 168 (L) 05/10/2013     POC:   Lab Results   Component Value Date     (H) 04/01/2021    HCG Negative 05/21/2014    HCGS Negative 12/30/2015     HEPATIC:   Lab Results   Component Value Date    ALBUMIN 4.2 07/18/2025    PROTTOTAL 7.1 07/18/2025    ALT 23 07/18/2025    AST 35 07/18/2025    ALKPHOS 105 07/18/2025    BILITOTAL 0.2 07/18/2025     OTHER:   Lab Results   Component Value Date    PH 7.36 05/10/2013    LACT <0.3 11/01/2024    A1C 6.9 (H) 07/18/2025    VALARIE 8.9 07/19/2025    PHOS 4.2 01/05/2023    MAG 1.7 08/22/2024    LIPASE 5 (L) 07/18/2025    AMYLASE 268 (H) 05/11/2013    TSH 0.25 (L) 11/21/2024    T4 1.65 11/21/2024    T3 90 04/24/2024    CRP 21.8 (H) 10/30/2020    SED 10 11/01/2024       Anesthesia Plan    ASA Status:  3       Anesthesia Type: General.  Airway: supraglottic airway.  Induction: intravenous.  Maintenance: Inhalation.   Techniques and Equipment:     - Airway:  Planned airway equipment includes supraglottic airway.     Consents    Anesthesia Plan(s) and associated risks, benefits, and realistic alternatives discussed. Questions answered and patient/representative(s) expressed understanding.     - Discussed: anesthesiologist     - Discussed with:  Patient               Postoperative Care    Pain management: multimodal analgesia.     Comments:                    Tom Thompson MD    I have reviewed the pertinent notes and labs in the chart from the past 30 days and (re)examined the patient.  Any updates or changes from those notes are reflected in this note.    Clinically Significant Risk Factors Present on Admission

## 2025-07-30 NOTE — DISCHARGE INSTRUCTIONS
Maximum acetaminophen (Tylenol) dose from all sources should not exceed 4 grams (4000 mg) per day. You had 975mg at 11:00AM. Do not take another dose until after 5:00PM.    CYSTOSCOPY DISCHARGE INSTRUCTIONS  [facility]  [address]   [phone]    YOU MAY GO BACK TO YOUR NORMAL DIET AND ACTIVITY, UNLESS YOUR DOCTOR TELLS YOU NOT TO.    FOR THE NEXT TWO DAYS, YOU MAY NOTICE:    SOME BLOOD IN YOUR URINE.  SOME BURNING WHEN YOU URINATE.  AN URGE TO URINATE MORE OFTEN.  BLADDER SPASMS.    THESE ARE NORMAL AFTER THE PROCEDURE.  THEY SHOULD GO AWAY AFTER A DAY OR TWO.  TO RELIEVE THESE PROBLEMS:     DRINK 6 TO 8 LARGE GLASSES OF WATER EACH DAY (INCLUDES DRINKS AT MEALS).  THIS WILL HELP CLEAR THE URINE.    TAKE WARM BATHS TO RELIEVE PAIN AND BLADDER SPASMS.  DO NOT ADD ANYTHING TO THE BATH WATER.    YOUR DOCTOR MAY PRESCRIBE PAIN MEDICINE.  YOU MAY ALSO TAKE TYLENOL (ACETAMINOPHEN) FOR PAIN.    CALL YOUR SURGEON IF YOU HAVE:    A FEVER OVER 101 DEGREES.  CHECK YOUR TEMPERATURE UNDER YOUR TONGUE.    CHILLS.    FAILURE TO URINATE (NO URINE COMES OUT WHEN YOU TRY TO USE THE TOILET).  TRY SOAKING IN A BATHTUB FULL OF WARM WATER.  IF STILL NO URINE, CALL YOUR DOCTOR.    A LOT OF BLOOD IN THE URINE, OR BLOOD CLOTS LARGER THAN A NICKEL.      PAIN IN THE BACK OR BELLY AREA (ABDOMEN).    PAIN OR SPASMS THAT ARE NOT RELIEVED BY WARM TUB BATHS AND PAIN MEDICINE.      SEVERE PAIN, BURNING OR OTHER PROBLEMS WHILE PASSING URINE.    PAIN THAT GETS WORSE AFTER TWO DAYS.

## 2025-07-30 NOTE — OP NOTE
OPERATIVE REPORT    PREOPERATIVE DIAGNOSIS: Left kidney stone    POSTOPERATIVE DIAGNOSIS: Same    PROCEDURES PERFORMED: Cystoscopy, left ureteral stent exchange, left retrograde pyelogram, interpretation of fluoroscopic images, left ureteroscopy with thulium laser lithotripsy.  Use of the steerable ureteroscopic renal evacuation device    SURGEON: Tamir Del Rio M.D.    ANESTHESIA: General    COMPLICATIONS: None.     SIGNIFICANT FINDINGS:       BRIEF OPERATIVE INDICATIONS: Lynn Thompson is a(n) 60 year old female who presented with a large left kidney stone and urinary tract infection who previously had a stent placed.  She now presents for ureteroscopy to remove her stone burden.  After a discussion of all risks, benefits, and alternatives, the patient elected to proceed with definitive stone management. The patient understands the potential need for more than one procedure to eliminate all stone burden.      DESCRIPTION OF PROCEDURE:  After informed consent was obtained, the patient was transported to the operating room & placed supine on the table. After adequate anesthesia was induced, the patient was placed in lithotomy and prepped and draped in the usual sterile fashion. A timeout was taken to confirm correct patient, procedure and laterality. Pre-operative IV antibiotics were administered.     I introduced the 22 Stateless rigid cystoscope through the urethra into the bladder and performed cystoscopy.  The bladder was normal throughout.  I grasped the left-sided stent and pulled the level of the urethral meatus.  I cannulated the stent with a Glidewire under fluoroscopic guidance.  I then temporary passed a ureteral access sheath over the Glidewire so that I could place a second Glidewire.  I then clamped 1 wire to the drape and repassed the ureteral access sheath over the second working wire to the left UPJ.  I then passed the CVAC stable renal evacuation device into the left kidney and perform complete  renoscopy.  The patient had 1 large stone in the left renal pelvis.  I used the 200  m thulium laser fiber to dust the stone completely.  I used the CVAC device to suction out all dust fragments.  Once I removed all stone burden I performed a complete renoscopy and another retrograde pyelogram through the scope to make certain I removed all stones.  On the retrograde pyelogram there were no filling defects or dilatations present.  I removed the scope.  I removed the access sheath.  I passed a 5 x 26 double-J ureteral stent over the wire.  The wire was pulled back and a good curl was seen in the renal pelvis under fluoroscopy.  I reinserted the cystoscope and a good curl was seen in the bladder with direct visualization.  The bladder was drained and the procedure was concluded.  I placed a B and O suppository at the end of the case.    The patient tolerated procedure well without complications.  She went to the postanesthetic care unit in good condition.  She will go home from there.  I will see her back next week for stent removal in the office.

## 2025-07-30 NOTE — ANESTHESIA POSTPROCEDURE EVALUATION
Patient: Lynn Thompson    Procedure: Procedure(s):  Cystoscopy, left ureteral stent exchange, left ureteroscopy with laser lithotripsy and stone basketing, use of the steerable ureteroscopic renal evacuation device       Anesthesia Type:  General    Note:  Disposition: Outpatient   Postop Pain Control: Uneventful            Sign Out: Well controlled pain   PONV: No   Neuro/Psych: Uneventful            Sign Out: Acceptable/Baseline neuro status   Airway/Respiratory: Uneventful            Sign Out: Acceptable/Baseline resp. status   CV/Hemodynamics: Uneventful            Sign Out: Acceptable CV status; No obvious hypovolemia; No obvious fluid overload   Other NRE: NONE   DID A NON-ROUTINE EVENT OCCUR? No           Last vitals:  Vitals Value Taken Time   /82 07/30/25 15:15   Temp 97.3  F (36.3  C) 07/30/25 14:50   Pulse 82 07/30/25 15:20   Resp 14 07/30/25 15:20   SpO2 96 % 07/30/25 15:20   Vitals shown include unfiled device data.    Electronically Signed By: Tom Thompson MD  July 30, 2025  3:50 PM

## 2025-07-30 NOTE — ANESTHESIA CARE TRANSFER NOTE
Patient: Lynn Thompson    Procedure: Procedure(s):  Cystoscopy, left ureteral stent exchange, left ureteroscopy with laser lithotripsy and stone basketing, use of the steerable ureteroscopic renal evacuation device       Diagnosis: Kidney stone [N20.0]  Diagnosis Additional Information: No value filed.    Anesthesia Type:   General     Note:    Oropharynx: spontaneously breathing  Level of Consciousness: drowsy  Oxygen Supplementation: face mask    Independent Airway: airway patency satisfactory and stable  Dentition: dentition unchanged  Vital Signs Stable: post-procedure vital signs reviewed and stable  Report to RN Given: handoff report given  Patient transferred to: PACU    Handoff Report: Identifed the Patient, Identified the Reponsible Provider, Reviewed the pertinent medical history, Discussed the surgical course, Reviewed Intra-OP anesthesia mangement and issues during anesthesia, Set expectations for post-procedure period and Allowed opportunity for questions and acknowledgement of understanding  Vitals:  Vitals Value Taken Time   BP     Temp     Pulse 88 07/30/25 14:21   Resp 11 07/30/25 14:21   SpO2 100 % 07/30/25 14:21   Vitals shown include unfiled device data.    Electronically Signed By: MITZY Garcia CRNA  July 30, 2025  2:23 PM

## 2025-07-30 NOTE — ANESTHESIA PROCEDURE NOTES
Airway       Patient location during procedure: OR  Staff -        CRNA: Hayden Sharma APRN CRNA       Performed By: CRNAIndications and Patient Condition       Indications for airway management: sammy-procedural       Induction type:intravenous       Mask difficulty assessment: 1 - vent by mask    Final Airway Details       Final airway type: supraglottic airway    Supraglottic Airway Details        Type: LMA       LMA size: 4    Post intubation assessment        Placement verified by: capnometry, equal breath sounds and chest rise        Number of attempts at approach: 1       Secured with: commercial tube bran       Ease of procedure: easy       Dentition: Intact and Unchanged

## 2025-07-30 NOTE — PROGRESS NOTES
Medication Therapy Management (MTM) Encounter    ASSESSMENT:                            Medication Adherence/Access: No issues identified.    Diabetes   /Pancreatic insufficiency (Post-Pancreatectomy and Auto Islet Transplant)  Stable.  A1c at goal of less than 7 and time in range at goal of greater than 70%.    Allergy   Stable.     Constipation   Stable.  I did let the patient know that oxybutynin, hydroxyzine, ondansetron, and calcium are all constipating, and she was already aware of this.     GERD /Nausea  Stable.     Genitourinary Symptoms   Kidney stones- cystoscopy 7/30  Stable.  We did discuss that if she decides that the dry mouth is intolerable, she could talk to her urology team about backing off on the oxybutynin.     Mental Health   Anxiety and Depression  Stable per patient.     Pain /RLS  Stable.  Encouraged ferritin recheck as already planned.     Hypothyroidism   Stable.  Last T4 in normal limits.     Supplements   Stable.    PLAN:                            No changes advised today, though you could talk to urology about backing off on oxybutynin if the dry mouth is intolerable.    Follow-up: call or MyChart anytime.    SUBJECTIVE/OBJECTIVE:                          Lynn Thompson is a 60 year old female seen for a transitions of care visit. She was discharged from [facility] on 7/19 for kidney stone.      Reason for visit: General med review- patient has no questions today.    Allergies/ADRs: Reviewed in chart  Past Medical History: Reviewed in chart  Tobacco: She reports that she has never smoked. She has never used smokeless tobacco.  Alcohol: not currently using  THC/CBD: none  Caffeine: 2 cups coffee, 1 diet coke daily  Medication Adherence/Access: Patient uses pill box(es).  Per patient, misses medication 0 time(s) per week.     Diabetes   /Pancreatic insufficiency (Post-Pancreatectomy and Auto Islet Transplant)    Insulin Aspart Via Omnipod pump  Viokace 23410 with meals and  snacks as directed   Lantus for pump failure as needed  Glucagon for emergencies  Patient is not experiencing side effects.  Current diabetes symptoms: none  Diet/Exercise: No questions today  Feels her GI issues are well managed with the Viokace.     Blood sugar monitoring: Continuous Glucose Monitor and says her daily report says she's met her TIR goal of 70%  Eye exam in the last 12 months? Schedule for Aug 13th  Foot exam: up to date    Allergy     Loratidine 10 mg once daily  Patient reports no current medication side effects.    Patient feels that current therapy is effective.      Constipation     Fiber supplement daily  Not using Miralax right now  Patient feels that current therapy is effective.   Patient reports no current medication side effects.       GERD /Nausea    Omeprazole 40 mg twice daily  Famotidine 40 mg twice daily   Ondansetron 4mg as needed (maybe four times a month)  Patient feels that current regimen is effective.     Genitourinary Symptoms   Kidney stones- cystoscopy 7/30    Tamsulosin 0.4mg daily (until her stent is removed)  Oxybutynin 5mg three times daily (for stent pain)  Patient reports the following medication side effects: very dry mouth.    Current symptoms include: she feels that her symptoms are better since her procedure yesterday  Patient feels that current therapy is effective.  No other issues- no signs of infection.     Mental Health   Anxiety and Depression    Duloxetine 20mg once daily  Escitalopram 20mg once daily.   Hydroxyzine 25mg as needed for anxiety (rare)  Melatonin 10mg as needed for sleep  Patient reports no current medication side effects. No questions or concerns today. She has her good days and bad days in terms of sleep.     Pain /RLS    Gabapentin 300mg at bed (sometimes in the morning for RLS as well)  Acetaminophen as needed  Ibuprofen 400mg per day at the most (alternates single tablets with Tylenol)  Pain type/location: Mostly stent pain- doesn't take  pain meds most of the time when she is not dealing with kidney stones.  Patient feels that current therapy is effective.   Patient reports the following side effects: no medication side effects  She did have a low ferritin at her last check but as her symptoms were fairly controlled, they are just planning to recheck.     Hypothyroidism     Levothyroxine 125 mcg daily.   Patient is having the following symptoms: none.      Supplements     Calcium 600mg twice daily  No reported issues at this time, aside from some constipatoin.      Post Discharge Medication Reconciliation Status: discharge medications reconciled, continue medications without change.    I spent 24 minutes with this patient today. All changes were made via collaborative practice agreement with Maryana Brooks MD.     A summary of these recommendations was sent via [facility].    Cheri Li, PharmD, BCACP  Medication Therapy Management Provider  Phone: [phone]  [email or web address]    Telemedicine Visit Details  The patient's medications can be safely assessed via a telemedicine encounter.  Type of service:  Telephone visit  Originating Location (pt. Location): Home    Distant Location (provider location):  On-site  Start Time: 10:34 AM  End Time: 10:58 AM     Medication Therapy Recommendations  No medication therapy recommendations to display

## 2025-07-31 ENCOUNTER — VIRTUAL VISIT (OUTPATIENT)
Dept: PHARMACY | Facility: CLINIC | Age: 60
End: 2025-07-31
Attending: FAMILY MEDICINE
Payer: COMMERCIAL

## 2025-07-31 DIAGNOSIS — J30.2 SEASONAL ALLERGIC RHINITIS, UNSPECIFIED TRIGGER: ICD-10-CM

## 2025-07-31 DIAGNOSIS — K59.03 DRUG-INDUCED CONSTIPATION: ICD-10-CM

## 2025-07-31 DIAGNOSIS — E03.9 HYPOTHYROIDISM, UNSPECIFIED TYPE: ICD-10-CM

## 2025-07-31 DIAGNOSIS — Z90.410 POST-PANCREATECTOMY DIABETES (H): Primary | ICD-10-CM

## 2025-07-31 DIAGNOSIS — E13.9 POST-PANCREATECTOMY DIABETES (H): Primary | ICD-10-CM

## 2025-07-31 DIAGNOSIS — K86.81 EXOCRINE PANCREATIC INSUFFICIENCY: ICD-10-CM

## 2025-07-31 DIAGNOSIS — N20.0 KIDNEY STONE: ICD-10-CM

## 2025-07-31 DIAGNOSIS — E89.1 POST-PANCREATECTOMY DIABETES (H): Primary | ICD-10-CM

## 2025-07-31 DIAGNOSIS — Z78.9 TAKES DIETARY SUPPLEMENTS: ICD-10-CM

## 2025-07-31 DIAGNOSIS — K21.9 GERD WITHOUT ESOPHAGITIS: ICD-10-CM

## 2025-07-31 DIAGNOSIS — G25.81 RESTLESS LEG SYNDROME: ICD-10-CM

## 2025-07-31 DIAGNOSIS — F41.9 ANXIETY: ICD-10-CM

## 2025-07-31 NOTE — PATIENT INSTRUCTIONS
"Recommendations from today's MTM visit:                                                       No changes advised today, though you could talk to urology about backing off on oxybutynin if the dry mouth is intolerable.    Follow-up: call or MyChart anytime.    It was great speaking with you today.  I value your experience and would be very thankful for your time in providing feedback in our clinic survey. In the next few days, you may receive an email or text message from [facility] [employer] with a link to a survey related to your  clinical pharmacist.\"     To schedule another MTM appointment, please call the clinic directly or you may call the MTM scheduling line at [phone] or toll-free at [phone].     My Clinical Pharmacist's contact information:                                                      Please feel free to contact me with any questions or concerns you have.      Cheri Li PharmD, [facility]  Medication Therapy Management Provider  Phone: [phone]  [email or web address]    "

## 2025-08-03 LAB
APPEARANCE STONE: NORMAL
COMPN STONE: NORMAL
SPECIMEN WT: 76 MG

## 2025-08-11 ENCOUNTER — OFFICE VISIT (OUTPATIENT)
Dept: FAMILY MEDICINE | Facility: CLINIC | Age: 60
End: 2025-08-11
Attending: INTERNAL MEDICINE
Payer: COMMERCIAL

## 2025-08-11 VITALS
HEIGHT: 64 IN | DIASTOLIC BLOOD PRESSURE: 86 MMHG | BODY MASS INDEX: 26.29 KG/M2 | TEMPERATURE: 98.4 F | HEART RATE: 84 BPM | OXYGEN SATURATION: 98 % | WEIGHT: 154 LBS | SYSTOLIC BLOOD PRESSURE: 138 MMHG | RESPIRATION RATE: 16 BRPM

## 2025-08-11 DIAGNOSIS — N39.0 COMPLICATED UTI (URINARY TRACT INFECTION): ICD-10-CM

## 2025-08-11 DIAGNOSIS — N20.1 LEFT URETERAL STONE: ICD-10-CM

## 2025-08-11 DIAGNOSIS — Z09 HOSPITAL DISCHARGE FOLLOW-UP: Primary | ICD-10-CM

## 2025-08-11 PROBLEM — N20.0 KIDNEY STONE: Status: RESOLVED | Noted: 2025-07-18 | Resolved: 2025-08-11

## 2025-08-11 PROBLEM — N20.0 KIDNEY STONE ON LEFT SIDE: Status: RESOLVED | Noted: 2025-07-18 | Resolved: 2025-08-11

## 2025-08-11 PROCEDURE — 99214 OFFICE O/P EST MOD 30 MIN: CPT | Performed by: FAMILY MEDICINE

## 2025-08-11 PROCEDURE — 1111F DSCHRG MED/CURRENT MED MERGE: CPT | Performed by: FAMILY MEDICINE

## 2025-08-11 PROCEDURE — 3078F DIAST BP <80 MM HG: CPT | Performed by: FAMILY MEDICINE

## 2025-08-11 PROCEDURE — 3075F SYST BP GE 130 - 139MM HG: CPT | Performed by: FAMILY MEDICINE

## 2025-08-11 PROCEDURE — G2211 COMPLEX E/M VISIT ADD ON: HCPCS | Performed by: FAMILY MEDICINE

## 2025-08-11 ASSESSMENT — PATIENT HEALTH QUESTIONNAIRE - PHQ9
SUM OF ALL RESPONSES TO PHQ QUESTIONS 1-9: 1
SUM OF ALL RESPONSES TO PHQ QUESTIONS 1-9: 1
10. IF YOU CHECKED OFF ANY PROBLEMS, HOW DIFFICULT HAVE THESE PROBLEMS MADE IT FOR YOU TO DO YOUR WORK, TAKE CARE OF THINGS AT HOME, OR GET ALONG WITH OTHER PEOPLE: NOT DIFFICULT AT ALL

## 2025-08-14 DIAGNOSIS — Z90.410 POST-PANCREATECTOMY DIABETES (H): Primary | ICD-10-CM

## 2025-08-14 DIAGNOSIS — K86.89 PANCREATIC INSUFFICIENCY: ICD-10-CM

## 2025-08-14 DIAGNOSIS — E13.9 POST-PANCREATECTOMY DIABETES (H): Primary | ICD-10-CM

## 2025-08-14 DIAGNOSIS — Z98.84 GASTRIC BYPASS STATUS FOR OBESITY: ICD-10-CM

## 2025-08-14 DIAGNOSIS — E89.1 POST-PANCREATECTOMY DIABETES (H): Primary | ICD-10-CM

## 2025-08-14 DIAGNOSIS — K86.81 EXOCRINE PANCREATIC INSUFFICIENCY: ICD-10-CM

## 2025-08-18 ENCOUNTER — TRANSFERRED RECORDS (OUTPATIENT)
Dept: HEALTH INFORMATION MANAGEMENT | Facility: CLINIC | Age: 60
End: 2025-08-18
Payer: COMMERCIAL

## 2025-08-18 LAB — RETINOPATHY: NEGATIVE

## 2025-08-28 ENCOUNTER — VIRTUAL VISIT (OUTPATIENT)
Dept: UROLOGY | Facility: CLINIC | Age: 60
End: 2025-08-28
Payer: COMMERCIAL

## 2025-08-28 DIAGNOSIS — R53.83 FATIGUE, UNSPECIFIED TYPE: ICD-10-CM

## 2025-08-28 DIAGNOSIS — R82.992 HYPEROXALURIA: ICD-10-CM

## 2025-08-28 DIAGNOSIS — R82.991 HYPOCITRATURIA: ICD-10-CM

## 2025-08-28 DIAGNOSIS — N20.0 KIDNEY STONE: Primary | ICD-10-CM

## 2025-08-28 RX ORDER — POTASSIUM CITRATE 1080 MG/1
10 TABLET, EXTENDED RELEASE ORAL 2 TIMES DAILY WITH MEALS
Qty: 180 TABLET | Refills: 3 | Status: SHIPPED | OUTPATIENT
Start: 2025-08-28

## 2025-08-28 RX ORDER — IBUPROFEN 200 MG
2 CAPSULE ORAL 2 TIMES DAILY WITH MEALS
Qty: 360 TABLET | Refills: 3 | Status: SHIPPED | OUTPATIENT
Start: 2025-08-28

## (undated) DEVICE — ENDO TRAP POLYP QUICK CATCH 710201

## (undated) DEVICE — ESU NDL COLORADO MICRO 3CM STR N103A

## (undated) DEVICE — GLOVE PROTEXIS BLUE W/NEU-THERA 7.5  2D73EB75

## (undated) DEVICE — CATH URETERAL FLEX TIP TIGERTAIL 06FRX70CM 139006

## (undated) DEVICE — GUIDEWIRE URO STR STIFF .035"X150CM NITINOL 150NSS35

## (undated) DEVICE — GLOVE BIOGEL PI MICRO INDICATOR UNDERGLOVE SZ 7.0 48970

## (undated) DEVICE — GLOVE PROTEXIS MICRO 7.0  2D73PM70

## (undated) DEVICE — Device

## (undated) DEVICE — SYR 10ML LL W/O NDL

## (undated) DEVICE — TUBING SUCTION 12"X1/4" N612

## (undated) DEVICE — KIT CULTURE ESWAB AEROBE/ANAEROBE WHITE TOP R723480

## (undated) DEVICE — DRAPE SHEET MED 44X70" 9355

## (undated) DEVICE — PAD CHUX UNDERPAD 23X24" 7136

## (undated) DEVICE — ESU GROUND PAD ADULT W/CORD E7507

## (undated) DEVICE — CATH TRAY FOLEY SURESTEP 16FR W/URINE MTR STATLK LF A303416A

## (undated) DEVICE — SHEATH URETERAL ACCESS NAVIGATOR 13/15FRX36CM 250-209

## (undated) DEVICE — SYR 10ML LL W/O NDL 302995

## (undated) DEVICE — LINEN HALF SHEET 5512

## (undated) DEVICE — BAG CLEAR TRASH 1.3M 39X33" P4040C

## (undated) DEVICE — UNDERPAD 36X30 PREMIERPRO MAX ABS NS LF 676111

## (undated) DEVICE — SU ETHILON 4-0 PS-2 18" BLACK 1667H

## (undated) DEVICE — PREP SKIN SCRUB TRAY 4461A

## (undated) DEVICE — TUBING IRRIG CYSTO/BLADDER SET 81" LF 2C4040

## (undated) DEVICE — SOL WATER IRRIG 1000ML BOTTLE 2F7114

## (undated) DEVICE — PACK CYSTO CUSTOM RIDGES

## (undated) DEVICE — SOLUTION IV WATER 3L HYPOTONIC R8006

## (undated) DEVICE — ENDO SNARE HEXAGONAL ISNARE 2.5X4.0CM W/25GA NDL

## (undated) DEVICE — WIPES FOLEY CARE SURESTEP PROVON DFC100

## (undated) DEVICE — TUBING OXYGEN CANNULA NASAL 7FT

## (undated) DEVICE — PANTIES MESH LG/XLG 2PK 706M2

## (undated) DEVICE — COVER FOOTSWITCH W/CINCH 20X24" 923267

## (undated) DEVICE — SOL NACL 0.9% IRRIG 1000ML BOTTLE 2F7124

## (undated) DEVICE — ESU PENCIL W/SMOKE EVAC ROCKER E2350HS

## (undated) DEVICE — KIT ENDO FIRST STEP DISINFECTANT 200ML W/POUCH EP-4

## (undated) DEVICE — BNDG ELASTIC 4"X5YDS UNSTERILE 6611-40

## (undated) DEVICE — KIT PATIENT POSITIONING PIGAZZI LATEX FREE 40580

## (undated) DEVICE — ESU GROUND PAD UNIVERSAL W/O CORD

## (undated) DEVICE — PREP DYNA-HEX 4% CHG SCRUB 4OZ BOTTLE MDS098710

## (undated) DEVICE — ESU LIGASURE IMPACT OPEN SEALER/DVDR CVD LG JAW LF4418

## (undated) DEVICE — CAST PADDING 4" STERILE 9044S

## (undated) DEVICE — PREP CHLORAPREP 26ML TINTED ORANGE  260815

## (undated) DEVICE — TUBING IRRIG TUR Y TYPE 96" LF 6543-01

## (undated) DEVICE — LINEN TOWEL PACK X6 WHITE 5487

## (undated) DEVICE — BNDG KLING 2" 2231

## (undated) DEVICE — NDL ANGIOCATH 18GA 1.25" 4055

## (undated) DEVICE — SUTURE BOOTS 051003PBX

## (undated) DEVICE — SHEATH URETERAL ACCESS NAVIGATOR HD 12/14FRX36CM M0062502250

## (undated) DEVICE — GLOVE PROTEXIS POWDER FREE 8.0 ORTHOPEDIC 2D73ET80

## (undated) DEVICE — PACK OCULOPLATIC SEN15OCFSD

## (undated) DEVICE — GLOVE BIOGEL PI SZ 6.5 40865

## (undated) DEVICE — TUBING SUCTION 10'X3/16" N510

## (undated) DEVICE — PROTECTOR ARM ONE-STEP TRENDELENBURG 40418

## (undated) DEVICE — ENDO SNARE POLYPECTOMY OVAL 15MM LOOP SD-240U-15

## (undated) DEVICE — VALVE ENDOSCOPIC UROSEAL OD9- FR 1 HAND ADJUSTABLE Y PORT LA

## (undated) DEVICE — DRAPE SHEET REV FOLD 3/4 9349

## (undated) DEVICE — GLOVE BIOGEL PI ULTRATOUCH SZ 7.5 41175

## (undated) DEVICE — APPLICATORS COTTON TIP 6"X2 STERILE LF C15053-006

## (undated) DEVICE — SUCTION TIP POOLE K770

## (undated) DEVICE — TRAY PREP DRY SKIN SCRUB 067

## (undated) DEVICE — SU PDS II 2-0 CT-2 27"  Z333H

## (undated) DEVICE — SU ETHIBOND 0 CT-2 30" X412H

## (undated) DEVICE — BLADE KNIFE SURG 15C 371716

## (undated) DEVICE — DRSG TEGADERM 4X10" 1627

## (undated) DEVICE — ESU ELEC BLADE 6" COATED E1450-6

## (undated) DEVICE — TOURNIQUET SGL BLADDER 18"X4" RED 5921-218-135

## (undated) DEVICE — ENDO SYSTEM WATER BOTTLE & TUBING W/CO2 FILTER 00711549

## (undated) DEVICE — TUBING IRR LG BORE TUBE DRIP CHMBR 2 BG 94IN 313003

## (undated) DEVICE — SU ETHILON 4-0 PC-3 18" 1864G

## (undated) DEVICE — PEN MARKING SKIN FINE 31145942

## (undated) DEVICE — SU VICRYL 2-0 TIE 54" J615H

## (undated) DEVICE — PREP POVIDONE-IODINE 7.5% SCRUB 4OZ BOTTLE MDS093945

## (undated) DEVICE — BLADE KNIFE SURG 15 371115

## (undated) DEVICE — TUBE CULTURE ANAEROBIC PORT-A-CUL 11ML

## (undated) DEVICE — PACK AB HYST II

## (undated) DEVICE — BARRIER SEPRAFILM 3X5" X6 SHEETS 5086-02

## (undated) DEVICE — SYR BULB IRRIG 50ML LATEX FREE 0035280

## (undated) DEVICE — GLOVE BIOGEL PI MICRO INDICATOR UNDERGLOVE SZ 7.5 48975

## (undated) DEVICE — LINEN TOWEL PACK X5 5464

## (undated) DEVICE — DRAPE SPLIT SHEET 77X108 REINFORCED 29436

## (undated) DEVICE — KIT ENDO TURNOVER/PROCEDURE W/CLEAN A SCOPE LINERS 103888

## (undated) DEVICE — SU PDS II 0 TP-1 60" Z991G

## (undated) DEVICE — SYR 20ML LL W/O NDL

## (undated) DEVICE — PREP POVIDONE IODINE SOLUTION 10% 4OZ BOTTLE 29906-004

## (undated) DEVICE — SU VICRYL 2-0 CT-2 27" J333H

## (undated) DEVICE — FIBER LASER 200 UM DISPOSABLE TFL-FBX200S

## (undated) DEVICE — PACK HAND SOP32HARMO

## (undated) DEVICE — SU VICRYL 2-0 TIE 12X18" J905T

## (undated) DEVICE — PAD PERI INDIV WRAP 11" 2022A

## (undated) DEVICE — SU PDS II 1 TP-1 54" Z879G

## (undated) DEVICE — SOLUTION WATER 1000ML BOTTLE R5000-01

## (undated) DEVICE — DRSG XEROFORM 1X8"

## (undated) DEVICE — LINEN FULL SHEET 5511

## (undated) DEVICE — SUCTION MANIFOLD NEPTUNE 2 SYS 4 PORT 0702-020-000

## (undated) DEVICE — SU MONOCRYL 4-0 RB-1 27" Y214H

## (undated) DEVICE — PREP CHLORAPREP 26ML TINTED HI-LITE ORANGE 930815

## (undated) DEVICE — LINEN TOWEL PACK X30 5481

## (undated) DEVICE — DRAPE U SPLIT 74X120" 29440

## (undated) DEVICE — PACKING NUGAUZE 1/4" PLAIN 7631

## (undated) DEVICE — DRSG GAUZE 4X4" 8044

## (undated) DEVICE — SU VICRYL 4-0 P-3 18" UND  J494H

## (undated) DEVICE — SU PROLENE 5-0 P-3 18" 8698G

## (undated) DEVICE — GLOVE PROTEXIS BLUE W/NEU-THERA 7.0  2D73EB70

## (undated) DEVICE — CATH TRAY FOLEY SURESTEP 16FR W/URNE MTR STLK LATEX A303316A

## (undated) DEVICE — GLOVE PROTEXIS W/NEU-THERA 6.5  2D73TE65

## (undated) DEVICE — ESU PENCIL W/HOLSTER E2350H

## (undated) DEVICE — LASER FIBER HOLMIUM 365UM HTB-365

## (undated) DEVICE — CLOSURE SYS SKIN PREMIERPRO EXOFIN FUSION 4X22CM STRL 3472

## (undated) DEVICE — TUBING SMOKE EVAC 1CMX3M SEA3710

## (undated) DEVICE — CONTRAST ISOVUE 300 61% IOPAMIDOL 10X30ML VIAL 131525

## (undated) DEVICE — NDL ANGIOCATH 20GA 1.25" PROTECTIV 3066

## (undated) DEVICE — GLOVE PROTEXIS MICRO 6.5  2D73PM65

## (undated) DEVICE — GOWN IMPERVIOUS BREATHABLE SMART XLG 89045

## (undated) DEVICE — RAD RX ISOVUE 300 (50ML) 61% IOPAMIDOL CHARGE PER ML

## (undated) DEVICE — CVAC ASPIRATION SYSTEM CVC127020-1

## (undated) DEVICE — WIPE PREMOIST CLEANSING WASHCLOTHS 7988

## (undated) DEVICE — GLOVE PROTEXIS BLUE W/NEU-THERA 8.0  2D73EB80

## (undated) DEVICE — PREP SCRUB SOL EXIDINE 4% CHG 4OZ 29002-404

## (undated) DEVICE — ESU PENCIL W/SMOKE EVAC CVPLP2000

## (undated) DEVICE — SU SILK 4-0 RB-1 CR 8X18" C054D

## (undated) DEVICE — ESU ELEC BLADE HEX-LOCKING 2.5" E1450X

## (undated) DEVICE — PREP TECHNI-CARE CHLOROXYLENOL 3% 4OZ BOTTLE C222-4ZWO

## (undated) DEVICE — SU VICRYL 2-0 CT-2 27" UND J269H

## (undated) DEVICE — DRSG KERLIX 4 1/2"X4YDS ROLL 6730

## (undated) DEVICE — GOWN LG DISP 9515

## (undated) DEVICE — PACK VAG HYST

## (undated) DEVICE — GLOVE PROTEXIS POWDER FREE 7.5 ORTHOPEDIC 2D73ET75

## (undated) DEVICE — GLOVE BIOGEL PI SZ 7.0 40870

## (undated) DEVICE — DRAPE LEGGINGS CLEAR 8430

## (undated) DEVICE — GLOVE PROTEXIS MICRO 7.5  2D73PM75

## (undated) DEVICE — KIT CONNECTOR FOR OLYMPUS ENDOSCOPES DEFENDO 100310

## (undated) RX ORDER — DEXTROSE MONOHYDRATE, SODIUM CHLORIDE, AND POTASSIUM CHLORIDE 50; 1.49; 4.5 G/1000ML; G/1000ML; G/1000ML
INJECTION, SOLUTION INTRAVENOUS
Status: DISPENSED
Start: 2022-02-01

## (undated) RX ORDER — ROCURONIUM BROMIDE 50 MG/5 ML
SYRINGE (ML) INTRAVENOUS
Status: DISPENSED
Start: 2022-02-01

## (undated) RX ORDER — DEXAMETHASONE SODIUM PHOSPHATE 4 MG/ML
INJECTION, SOLUTION INTRA-ARTICULAR; INTRALESIONAL; INTRAMUSCULAR; INTRAVENOUS; SOFT TISSUE
Status: DISPENSED
Start: 2022-02-01

## (undated) RX ORDER — FENTANYL CITRATE 50 UG/ML
INJECTION, SOLUTION INTRAMUSCULAR; INTRAVENOUS
Status: DISPENSED
Start: 2024-11-02

## (undated) RX ORDER — ACETAMINOPHEN 325 MG/1
TABLET ORAL
Status: DISPENSED
Start: 2024-11-02

## (undated) RX ORDER — ONDANSETRON 2 MG/ML
INJECTION INTRAMUSCULAR; INTRAVENOUS
Status: DISPENSED
Start: 2022-02-01

## (undated) RX ORDER — NEOSTIGMINE METHYLSULFATE 1 MG/ML
VIAL (ML) INJECTION
Status: DISPENSED
Start: 2022-09-20

## (undated) RX ORDER — DEXAMETHASONE SODIUM PHOSPHATE 4 MG/ML
INJECTION, SOLUTION INTRA-ARTICULAR; INTRALESIONAL; INTRAMUSCULAR; INTRAVENOUS; SOFT TISSUE
Status: DISPENSED
Start: 2024-11-02

## (undated) RX ORDER — FENTANYL CITRATE 50 UG/ML
INJECTION, SOLUTION INTRAMUSCULAR; INTRAVENOUS
Status: DISPENSED
Start: 2025-07-18

## (undated) RX ORDER — HYDROMORPHONE HYDROCHLORIDE 1 MG/ML
INJECTION, SOLUTION INTRAMUSCULAR; INTRAVENOUS; SUBCUTANEOUS
Status: DISPENSED
Start: 2022-02-01

## (undated) RX ORDER — LIDOCAINE HYDROCHLORIDE 10 MG/ML
INJECTION, SOLUTION EPIDURAL; INFILTRATION; INTRACAUDAL; PERINEURAL
Status: DISPENSED
Start: 2025-07-18

## (undated) RX ORDER — FENTANYL CITRATE 50 UG/ML
INJECTION, SOLUTION INTRAMUSCULAR; INTRAVENOUS
Status: DISPENSED
Start: 2020-10-30

## (undated) RX ORDER — FENTANYL CITRATE 50 UG/ML
INJECTION, SOLUTION INTRAMUSCULAR; INTRAVENOUS
Status: DISPENSED
Start: 2022-02-01

## (undated) RX ORDER — ONDANSETRON 2 MG/ML
INJECTION INTRAMUSCULAR; INTRAVENOUS
Status: DISPENSED
Start: 2020-06-09

## (undated) RX ORDER — ONDANSETRON 2 MG/ML
INJECTION INTRAMUSCULAR; INTRAVENOUS
Status: DISPENSED
Start: 2022-09-20

## (undated) RX ORDER — SIMETHICONE 40MG/0.6ML
SUSPENSION, DROPS(FINAL DOSAGE FORM)(ML) ORAL
Status: DISPENSED
Start: 2021-04-01

## (undated) RX ORDER — CEFAZOLIN SODIUM/WATER 2 G/20 ML
SYRINGE (ML) INTRAVENOUS
Status: DISPENSED
Start: 2024-11-02

## (undated) RX ORDER — BUPIVACAINE HYDROCHLORIDE 2.5 MG/ML
INJECTION, SOLUTION EPIDURAL; INFILTRATION; INTRACAUDAL
Status: DISPENSED
Start: 2024-11-02

## (undated) RX ORDER — PROPOFOL 10 MG/ML
INJECTION, EMULSION INTRAVENOUS
Status: DISPENSED
Start: 2024-11-02

## (undated) RX ORDER — ACETAMINOPHEN 325 MG/1
TABLET ORAL
Status: DISPENSED
Start: 2025-07-30

## (undated) RX ORDER — ONDANSETRON 2 MG/ML
INJECTION INTRAMUSCULAR; INTRAVENOUS
Status: DISPENSED
Start: 2024-11-02

## (undated) RX ORDER — PROPOFOL 10 MG/ML
INJECTION, EMULSION INTRAVENOUS
Status: DISPENSED
Start: 2025-07-18

## (undated) RX ORDER — GLYCOPYRROLATE 0.2 MG/ML
INJECTION INTRAMUSCULAR; INTRAVENOUS
Status: DISPENSED
Start: 2022-09-20

## (undated) RX ORDER — EPHEDRINE SULFATE 50 MG/ML
INJECTION, SOLUTION INTRAMUSCULAR; INTRAVENOUS; SUBCUTANEOUS
Status: DISPENSED
Start: 2022-02-01

## (undated) RX ORDER — GLYCOPYRROLATE 0.2 MG/ML
INJECTION, SOLUTION INTRAMUSCULAR; INTRAVENOUS
Status: DISPENSED
Start: 2024-11-02

## (undated) RX ORDER — ATROPA BELLADONNA AND OPIUM 16.2; 3 MG/1; MG/1
SUPPOSITORY RECTAL
Status: DISPENSED
Start: 2025-07-30

## (undated) RX ORDER — ERTAPENEM 1 G/1
INJECTION, POWDER, LYOPHILIZED, FOR SOLUTION INTRAMUSCULAR; INTRAVENOUS
Status: DISPENSED
Start: 2022-02-01

## (undated) RX ORDER — GRANISETRON HYDROCHLORIDE 1 MG/ML
INJECTION INTRAVENOUS
Status: DISPENSED
Start: 2022-02-01

## (undated) RX ORDER — GLYCOPYRROLATE 0.2 MG/ML
INJECTION, SOLUTION INTRAMUSCULAR; INTRAVENOUS
Status: DISPENSED
Start: 2022-02-01

## (undated) RX ORDER — FENTANYL CITRATE 50 UG/ML
INJECTION, SOLUTION INTRAMUSCULAR; INTRAVENOUS
Status: DISPENSED
Start: 2022-09-20

## (undated) RX ORDER — FENTANYL CITRATE-0.9 % NACL/PF 10 MCG/ML
PLASTIC BAG, INJECTION (ML) INTRAVENOUS
Status: DISPENSED
Start: 2025-07-18

## (undated) RX ORDER — FENTANYL CITRATE-0.9 % NACL/PF 10 MCG/ML
PLASTIC BAG, INJECTION (ML) INTRAVENOUS
Status: DISPENSED
Start: 2022-02-01

## (undated) RX ORDER — CEFAZOLIN SODIUM/WATER 2 G/20 ML
SYRINGE (ML) INTRAVENOUS
Status: DISPENSED
Start: 2025-07-30

## (undated) RX ORDER — ACETAMINOPHEN 325 MG/1
TABLET ORAL
Status: DISPENSED
Start: 2022-02-01

## (undated) RX ORDER — FLUMAZENIL 0.1 MG/ML
INJECTION, SOLUTION INTRAVENOUS
Status: DISPENSED
Start: 2022-02-01

## (undated) RX ORDER — FENTANYL CITRATE 50 UG/ML
INJECTION, SOLUTION INTRAMUSCULAR; INTRAVENOUS
Status: DISPENSED
Start: 2021-04-01

## (undated) RX ORDER — SIMETHICONE 40MG/0.6ML
SUSPENSION, DROPS(FINAL DOSAGE FORM)(ML) ORAL
Status: DISPENSED
Start: 2021-03-31

## (undated) RX ORDER — BUPIVACAINE HYDROCHLORIDE AND EPINEPHRINE 2.5; 5 MG/ML; UG/ML
INJECTION, SOLUTION EPIDURAL; INFILTRATION; INTRACAUDAL; PERINEURAL
Status: DISPENSED
Start: 2020-10-30

## (undated) RX ORDER — FENTANYL CITRATE 50 UG/ML
INJECTION, SOLUTION INTRAMUSCULAR; INTRAVENOUS
Status: DISPENSED
Start: 2020-06-09

## (undated) RX ORDER — CEFAZOLIN SODIUM 1 G/3ML
INJECTION, POWDER, FOR SOLUTION INTRAMUSCULAR; INTRAVENOUS
Status: DISPENSED
Start: 2022-09-20

## (undated) RX ORDER — PROPOFOL 10 MG/ML
INJECTION, EMULSION INTRAVENOUS
Status: DISPENSED
Start: 2020-06-09

## (undated) RX ORDER — ATROPA BELLADONNA AND OPIUM 16.2; 3 MG/1; MG/1
SUPPOSITORY RECTAL
Status: DISPENSED
Start: 2022-09-20

## (undated) RX ORDER — CEFAZOLIN SODIUM 1 G/3ML
INJECTION, POWDER, FOR SOLUTION INTRAMUSCULAR; INTRAVENOUS
Status: DISPENSED
Start: 2024-11-02

## (undated) RX ORDER — LIDOCAINE HYDROCHLORIDE 10 MG/ML
INJECTION, SOLUTION EPIDURAL; INFILTRATION; INTRACAUDAL; PERINEURAL
Status: DISPENSED
Start: 2024-11-02

## (undated) RX ORDER — KETOROLAC TROMETHAMINE 30 MG/ML
INJECTION, SOLUTION INTRAMUSCULAR; INTRAVENOUS
Status: DISPENSED
Start: 2024-11-02

## (undated) RX ORDER — OXYCODONE HYDROCHLORIDE 5 MG/1
TABLET ORAL
Status: DISPENSED
Start: 2024-11-02

## (undated) RX ORDER — PROPOFOL 10 MG/ML
INJECTION, EMULSION INTRAVENOUS
Status: DISPENSED
Start: 2022-02-01

## (undated) RX ORDER — PROPOFOL 10 MG/ML
INJECTION, EMULSION INTRAVENOUS
Status: DISPENSED
Start: 2020-10-30

## (undated) RX ORDER — FENTANYL CITRATE 50 UG/ML
INJECTION, SOLUTION INTRAMUSCULAR; INTRAVENOUS
Status: DISPENSED
Start: 2018-01-24

## (undated) RX ORDER — FENTANYL CITRATE 50 UG/ML
INJECTION, SOLUTION INTRAMUSCULAR; INTRAVENOUS
Status: DISPENSED
Start: 2021-03-31

## (undated) RX ORDER — EPHEDRINE SULFATE 50 MG/ML
INJECTION, SOLUTION INTRAMUSCULAR; INTRAVENOUS; SUBCUTANEOUS
Status: DISPENSED
Start: 2024-11-02

## (undated) RX ORDER — CEFAZOLIN SODIUM 2 G/100ML
INJECTION, SOLUTION INTRAVENOUS
Status: DISPENSED
Start: 2020-10-30

## (undated) RX ORDER — ACETAMINOPHEN 325 MG/1
TABLET ORAL
Status: DISPENSED
Start: 2025-07-18

## (undated) RX ORDER — FENTANYL CITRATE 50 UG/ML
INJECTION, SOLUTION INTRAMUSCULAR; INTRAVENOUS
Status: DISPENSED
Start: 2025-07-30

## (undated) RX ORDER — PROPOFOL 10 MG/ML
INJECTION, EMULSION INTRAVENOUS
Status: DISPENSED
Start: 2022-09-20

## (undated) RX ORDER — LIDOCAINE HYDROCHLORIDE 20 MG/ML
INJECTION, SOLUTION EPIDURAL; INFILTRATION; INTRACAUDAL; PERINEURAL
Status: DISPENSED
Start: 2020-06-09

## (undated) RX ORDER — LIDOCAINE HYDROCHLORIDE 10 MG/ML
INJECTION, SOLUTION EPIDURAL; INFILTRATION; INTRACAUDAL; PERINEURAL
Status: DISPENSED
Start: 2022-09-20

## (undated) RX ORDER — CEFAZOLIN SODIUM 1 G/3ML
INJECTION, POWDER, FOR SOLUTION INTRAMUSCULAR; INTRAVENOUS
Status: DISPENSED
Start: 2022-02-01

## (undated) RX ORDER — ONDANSETRON 2 MG/ML
INJECTION INTRAMUSCULAR; INTRAVENOUS
Status: DISPENSED
Start: 2025-07-18

## (undated) RX ORDER — IBUPROFEN 600 MG/1
TABLET, FILM COATED ORAL
Status: DISPENSED
Start: 2025-07-30

## (undated) RX ORDER — PHENAZOPYRIDINE HYDROCHLORIDE 200 MG/1
TABLET, FILM COATED ORAL
Status: DISPENSED
Start: 2022-02-01

## (undated) RX ORDER — CEFAZOLIN SODIUM/WATER 2 G/20 ML
SYRINGE (ML) INTRAVENOUS
Status: DISPENSED
Start: 2025-07-18

## (undated) RX ORDER — EPHEDRINE SULFATE 50 MG/ML
INJECTION, SOLUTION INTRAMUSCULAR; INTRAVENOUS; SUBCUTANEOUS
Status: DISPENSED
Start: 2022-09-20